# Patient Record
Sex: MALE | Race: WHITE | NOT HISPANIC OR LATINO | Employment: OTHER | URBAN - METROPOLITAN AREA
[De-identification: names, ages, dates, MRNs, and addresses within clinical notes are randomized per-mention and may not be internally consistent; named-entity substitution may affect disease eponyms.]

---

## 2017-01-03 ENCOUNTER — TELEPHONE (OUTPATIENT)
Dept: PHARMACY | Facility: CLINIC | Age: 62
End: 2017-01-03

## 2017-01-06 ENCOUNTER — TELEPHONE (OUTPATIENT)
Dept: PHARMACY | Facility: CLINIC | Age: 62
End: 2017-01-06

## 2017-01-11 ENCOUNTER — TELEPHONE (OUTPATIENT)
Dept: GASTROENTEROLOGY | Facility: CLINIC | Age: 62
End: 2017-01-11

## 2017-01-13 DIAGNOSIS — Z01.818 PRE-TRANSPLANT EVALUATION FOR CHRONIC LIVER DISEASE: Primary | ICD-10-CM

## 2017-01-24 ENCOUNTER — TELEPHONE (OUTPATIENT)
Dept: TRANSPLANT | Facility: CLINIC | Age: 62
End: 2017-01-24

## 2017-01-24 NOTE — TELEPHONE ENCOUNTER
----- Message from Ekaterina Terrell sent at 1/24/2017  9:38 AM CST -----  Contact: Grindl/spouse  Called to see why the PET scan is needed, please call back at [phone]

## 2017-01-24 NOTE — TELEPHONE ENCOUNTER
Phone call returned to patient/ patient's wife.  Questions regarding PET scan answered and addressed.  Informed them that dobutamine stress test showed a severe level of ischemia.Discussed how the PET stress will be more effective at assessing the level of ischemia.  Understanding verbalized.  All instructions regarding the test provided.  Denies any further questions.

## 2017-01-30 ENCOUNTER — TELEPHONE (OUTPATIENT)
Dept: PHARMACY | Facility: CLINIC | Age: 62
End: 2017-01-30

## 2017-02-01 ENCOUNTER — CLINICAL SUPPORT (OUTPATIENT)
Dept: CARDIOLOGY | Facility: CLINIC | Age: 62
End: 2017-02-01
Payer: COMMERCIAL

## 2017-02-01 DIAGNOSIS — Z01.818 PRE-TRANSPLANT EVALUATION FOR CHRONIC LIVER DISEASE: ICD-10-CM

## 2017-02-01 LAB — DIASTOLIC DYSFUNCTION: NO

## 2017-02-01 PROCEDURE — A9555 RB82 RUBIDIUM: HCPCS | Mod: S$GLB,,, | Performed by: INTERNAL MEDICINE

## 2017-02-01 PROCEDURE — 78492 MYOCRD IMG PET MLT RST&STRS: CPT | Mod: S$GLB,,, | Performed by: INTERNAL MEDICINE

## 2017-02-01 PROCEDURE — 96372 THER/PROPH/DIAG INJ SC/IM: CPT | Mod: 59,S$GLB,, | Performed by: INTERNAL MEDICINE

## 2017-02-01 PROCEDURE — 93015 CV STRESS TEST SUPVJ I&R: CPT | Mod: S$GLB,,, | Performed by: INTERNAL MEDICINE

## 2017-02-02 ENCOUNTER — TELEPHONE (OUTPATIENT)
Dept: CARDIOLOGY | Facility: CLINIC | Age: 62
End: 2017-02-02

## 2017-02-02 NOTE — TELEPHONE ENCOUNTER
----- Message from Elan Marin MD sent at 2/1/2017  5:51 PM CST -----  I have reviewed these results which are abnormal. Please open a telephone encounter, call patient to inform them and close the encounter. Not necessary to route back to me.

## 2017-02-02 NOTE — TELEPHONE ENCOUNTER
Patient notified of results of pet stress test. Appointment made in clinic for February 7. Patient and wife agreed. Reminder slip sent in mail. All questions answered.

## 2017-02-03 ENCOUNTER — OFFICE VISIT (OUTPATIENT)
Dept: GASTROENTEROLOGY | Facility: CLINIC | Age: 62
End: 2017-02-03
Payer: COMMERCIAL

## 2017-02-03 VITALS
SYSTOLIC BLOOD PRESSURE: 126 MMHG | DIASTOLIC BLOOD PRESSURE: 70 MMHG | WEIGHT: 297.63 LBS | HEIGHT: 74 IN | BODY MASS INDEX: 38.2 KG/M2 | HEART RATE: 80 BPM

## 2017-02-03 DIAGNOSIS — K74.60 CIRRHOSIS OF LIVER WITHOUT ASCITES, UNSPECIFIED HEPATIC CIRRHOSIS TYPE: Primary | ICD-10-CM

## 2017-02-03 DIAGNOSIS — R94.39 ABNORMAL CARDIOVASCULAR STRESS TEST: ICD-10-CM

## 2017-02-03 DIAGNOSIS — I25.10 CORONARY ARTERY DISEASE INVOLVING NATIVE CORONARY ARTERY OF NATIVE HEART WITHOUT ANGINA PECTORIS: ICD-10-CM

## 2017-02-03 DIAGNOSIS — K76.82 HEPATIC ENCEPHALOPATHY: ICD-10-CM

## 2017-02-03 PROCEDURE — 99214 OFFICE O/P EST MOD 30 MIN: CPT | Mod: S$GLB,,, | Performed by: INTERNAL MEDICINE

## 2017-02-03 PROCEDURE — 99999 PR PBB SHADOW E&M-EST. PATIENT-LVL III: CPT | Mod: PBBFAC,,, | Performed by: INTERNAL MEDICINE

## 2017-02-03 RX ORDER — ASPIRIN 81 MG/1
81 TABLET ORAL DAILY
Status: ON HOLD | COMMUNITY
End: 2018-01-27 | Stop reason: HOSPADM

## 2017-02-03 NOTE — TELEPHONE ENCOUNTER
Patient present at [facility] to  his Xifaxan on 02/03/2017 @10:04am. Prescription was ready at [facility] (profiled prescription for patient to fill at [facility] location).

## 2017-02-03 NOTE — PROGRESS NOTES
Subjective:     Alon Adamson is here for follow up of Cirrhosis      HPI  Since Alon Adamson's last visit he denies any acute issues.  Overall he has been feeling well.  He reports significant stool output to the lactulose that is keeping him thinking clearly.  No issues with overt encephalopathy or seizures.  Denies any gastrointestinal bleeding.  No history of GI bleeding.    Since last visit he was presented to the transplant team and was deferred for transplantation because of abnormal cardiac evaluation.  I have discussed with Dr. Marin seeing if there is any way every week and intervene on his cardiovascular disease and then at some point repeat a dobutamine stress test to see if he has a more favorable response.  Otherwise the transplant team will have to decline the patient given high risk secondary to abnormal cardiac stress testing along with other co morbidities (obesity and diabetes)    Review of Systems   Constitutional: Negative.    HENT: Negative.    Eyes: Negative.    Respiratory: Negative.    Cardiovascular: Negative.    Gastrointestinal: Negative.    Genitourinary: Negative.    Musculoskeletal: Negative.    Skin: Negative.    Neurological: Negative.    Psychiatric/Behavioral: Negative.        Objective:     Physical Exam   Constitutional: He is oriented to person, place, and time. He appears well-developed and well-nourished. No distress.   HENT:   Head: Normocephalic and atraumatic.   Mouth/Throat: No oropharyngeal exudate.   Poor dentition   Eyes: Conjunctivae are normal. Pupils are equal, round, and reactive to light. Right eye exhibits no discharge. Left eye exhibits no discharge. No scleral icterus.   Pulmonary/Chest: Effort normal and breath sounds normal. No respiratory distress. He has no wheezes.   Abdominal: Soft. He exhibits no distension. There is no tenderness.   Musculoskeletal: He exhibits edema (minimal BLE).   Neurological: He is alert and oriented to person,  place, and time.   Psychiatric: He has a normal mood and affect. His behavior is normal.   Vitals reviewed.      MELD-Na score: 16 at 11/2/2016  1:05 PM  MELD score: 14 at 11/2/2016  1:05 PM  Calculated from:  Serum Creatinine: 1.2 mg/dL at 11/2/2016  1:05 PM  Serum Sodium: 135 mmol/L at 11/2/2016  1:05 PM  Total Bilirubin: 1.9 mg/dL at 11/2/2016  1:05 PM  INR(ratio): 1.3 at 11/2/2016  1:05 PM  Age: 61 years    WBC   Date Value Ref Range Status   12/07/2016 4.38 3.90 - 12.70 K/uL Final     Hemoglobin   Date Value Ref Range Status   12/07/2016 11.8 (L) 14.0 - 18.0 g/dL Final     Hematocrit   Date Value Ref Range Status   12/07/2016 33.5 (L) 40.0 - 54.0 % Final     Platelets   Date Value Ref Range Status   12/07/2016 58 (L) 150 - 350 K/uL Final     BUN, Bld   Date Value Ref Range Status   12/07/2016 13 6 - 20 mg/dL Final     Creatinine   Date Value Ref Range Status   12/07/2016 1.3 0.5 - 1.4 mg/dL Final     Glucose   Date Value Ref Range Status   12/07/2016 132 (H) 70 - 110 mg/dL Final     Calcium   Date Value Ref Range Status   12/07/2016 8.8 8.7 - 10.5 mg/dL Final     Sodium   Date Value Ref Range Status   12/07/2016 132 (L) 136 - 145 mmol/L Final     Potassium   Date Value Ref Range Status   12/07/2016 5.1 3.5 - 5.1 mmol/L Final     Comment:     *Moderately Hemolyzed     Chloride   Date Value Ref Range Status   12/07/2016 108 95 - 110 mmol/L Final     Magnesium   Date Value Ref Range Status   09/14/2016 1.5 (L) 1.6 - 2.6 mg/dL Final     AST   Date Value Ref Range Status   11/02/2016 50 (H) 10 - 40 U/L Final     ALT   Date Value Ref Range Status   11/02/2016 30 10 - 44 U/L Final     Alkaline Phosphatase   Date Value Ref Range Status   11/02/2016 84 55 - 135 U/L Final     Total Bilirubin   Date Value Ref Range Status   11/02/2016 1.9 (H) 0.1 - 1.0 mg/dL Final     Comment:     For infants and newborns, interpretation of results should be based  on gestational age, weight and in agreement with  clinical  observations.  Premature Infant recommended reference ranges:  Up to 24 hours.............<8.0 mg/dL  Up to 48 hours............<12.0 mg/dL  3-5 days..................<15.0 mg/dL  6-29 days.................<15.0 mg/dL       Albumin   Date Value Ref Range Status   11/02/2016 3.2 (L) 3.5 - 5.2 g/dL Final     INR   Date Value Ref Range Status   12/07/2016 1.2 0.8 - 1.2 Final     Comment:     Coumadin Therapy:  2.0 - 3.0 for INR for all indicators except mechanical heart valves  and antiphospholipid syndromes which should use 2.5 - 3.5.           Assessment/Plan:     1. Cirrhosis of liver without ascites, unspecified hepatic cirrhosis type    2. Hepatic encephalopathy    3. Abnormal cardiovascular stress test    4. Coronary artery disease involving native coronary artery of native heart without angina pectoris      Alon Adamson is a 61 y.o. male withCirrhosis    Cirrhosis of liver without ascites, unspecified hepatic cirrhosis type-decompensated with elevated meld score warrants transplant the transplant team is highly concerned about cardiac risk as well as his comorbidities.  He is currently deferred for listing until his cardiac issues can be further evaluated and managed.  -Meld labs and HCC surveillance due in about 8 weeks  -     US Abdomen Limited; Future; Expected date: 2/3/17  -     Comprehensive metabolic panel; Future; Expected date: 2/3/17  -     CBC auto differential; Future; Expected date: 2/3/17  -     AFP tumor marker; Future; Expected date: 2/3/17  -     Protime-INR; Future; Expected date: 2/3/17    Hepatic encephalopathy-controlled  -Continue lactulose and rifaximin    Abnormal cardiovascular stress test/Coronary artery disease involving native coronary artery of native heart without angina pectoris-patient does have coronary artery disease.  He is following up with cardiology.  -Extensive discussion with patient and family about the fact that if there is intervenable disease we should  try to intervene and then repeat dobutamine stress test to see if he has a more favorable outcome.  If we can show some improvement in his cardiac stress test then he can be represented to the transplant team.  If there is no disease that can be intervened upon then he may not be a transplant candidate. Also explained that we could do nothing and monitor for now given his MELD is relatively low but it is unlikely he would be listed for transplant at this time. They expressed understanding about the complexities of this case, risks and benefits and would like to more forward with trying to intervene if possible on his CAD.  -Advised that he can start a statin and any other medication cardiology feels is necessary to manage his coronary artery disease    RTC in 8 weeks with preclinic labs and imaging    Patient was seen in the liver transplant department at [facility].    Miriam García MD

## 2017-02-03 NOTE — MR AVS SNAPSHOT
[facility]  [address] [place] [address]  Phone: [phone]  Fax: [phone]                  Alon Adamson   2/3/2017 10:30 AM   Office Visit    Description:  Male : 1955   Provider:  Miriam García MD   Department:  [facility]           Reason for Visit     Cirrhosis           Diagnoses this Visit        Comments    Cirrhosis of liver without ascites, unspecified hepatic cirrhosis type    -  Primary     Hepatic encephalopathy         Abnormal cardiovascular stress test         Coronary artery disease involving native coronary artery of native heart without angina pectoris                To Do List           Future Appointments        Provider Department Dept Phone    2017 12:40 PM Elan Marin MD [facility]-Interventional Card [phone]    2017 9:05 AM [facility]AT[facility], [facility] [phone]    2017 9:15 AM [facility] [phone]    2017 10:30 AM Miriam García MD [facility] [phone]      Goals (5 Years of Data)     None      Follow-Up and Disposition     Return in about 8 weeks (around 3/31/2017).      [facility]     [facility] -  Assistance  Registered nurses in the [facility] provide clinical advisement, health education, appointment booking, and other advisory services.  Call for this free service at [phone].             Medications           Message regarding Medications     Verify the changes and/or additions to your medication regime listed below are the same as discussed with your clinician today.  If any of these changes or additions are incorrect, please notify your healthcare provider.             Verify that the below list of medications is an accurate representation of the medications you are currently taking.  If none reported, the list may be blank. If incorrect, please contact your healthcare provider.  "Carry this list with you in case of emergency.           Current Medications     ascorbic acid (VITAMIN C) 1000 MG tablet Take 1,000 mg by mouth once daily.    aspirin (ECOTRIN) 81 MG EC tablet Take 81 mg by mouth once daily.    docusate sodium (COLACE) 100 MG capsule Take 240 mg by mouth once daily.    ferrous sulfate 325 mg (65 mg iron) Tab tablet Take 325 mg by mouth daily with breakfast.    fish oil-omega-3 fatty acids 300-1,000 mg capsule Take 2 capsules (2 g total) by mouth once daily.    furosemide (LASIX) 40 MG tablet Take 1 tablet (40 mg total) by mouth 2 (two) times daily.    GENERLAC 10 gram/15 mL solution Take 60 mLs by mouth. 60mg three times per day    glyBURIDE-metformin 2.5-500 mg (GLUCOVANCE) 2.5-500 mg per tablet Take 2 tablets by mouth once daily. & 1 tab po PM    levetiracetam (KEPPRA) 500 MG Tab Take 500 mg by mouth 2 (two) times daily.    magnesium oxide (MAG-OX) 400 mg tablet Take 1 tablet (400 mg total) by mouth once daily.    melatonin 3 mg Tab Take 3 mg by mouth 2 (two) times daily as needed.    ondansetron (ZOFRAN) 4 MG tablet Take 4 mg by mouth every 8 (eight) hours as needed.    spironolactone (ALDACTONE) 100 MG tablet Take 1.5 tablets (150 mg total) by mouth 2 (two) times daily.    tramadol (ULTRAM) 50 mg tablet Take 50 mg by mouth every 6 (six) hours as needed for Pain.    XIFAXAN 550 mg Tab TAKE 1 TABLET BY MOUTH TWO TIMES A DAY           Clinical Reference Information           Your Vitals Were     BP Pulse Height Weight BMI    126/70 80 6' 2" (1.88 m) 135 kg (297 lb 9.9 oz) 38.21 kg/m2      Blood Pressure          Most Recent Value    BP  126/70      Allergies as of 2/3/2017     Nsaids (Non-steroidal Anti-inflammatory Drug)    Tylenol [Acetaminophen]    Penicillins      Immunizations Administered on Date of Encounter - 2/3/2017     None      Orders Placed During Today's Visit     Future Labs/Procedures Expected by Expires    AFP tumor marker  2/3/2017 4/4/2018    CBC auto " differential  2/3/2017 4/4/2018    Comprehensive metabolic panel  2/3/2017 4/4/2018    Protime-INR  2/3/2017 4/4/2018    US Abdomen Limited  2/3/2017 2/3/2018      Maintenance Dialysis History     Patient has no recorded history of maintenance dialysis.      Transplant Information        Txp Date Organ Coordinator Care Team     Liver Cecilia Beard RN Referring Physician:  BARBARA Rivera   Corresponding Physician:  BARBARA Rivera   Current Hepatologist:  Miriam García MD   Current PCP:  Mckinley Vides MD   Corresponding Physician:  Mckinley Vides MD         Smoking Cessation     If you would like to quit smoking:   You may be eligible for free services if you are a [place] resident and started smoking cigarettes before September 1, 1988.  Call the Smoking Cessation Trust ([facility]) toll free at [phone] or [phone].   Call [phone]-QUIT-NOW if you do not meet the above criteria.            Language Assistance Services     ATTENTION: Language assistance services are available, free of charge. Please call [phone].      ATENCIÓN: Si habla español, tiene a reed disposición servicios gratuitos de asistencia lingüística. Llame al [phone].     CHÚ Ý: N?u b?n nói Ti?ng Vi?t, có các d?ch v? h? tr? ngôn ng? mi?n phí dành cho b?n. G?i s? [phone].         [facility] complies with applicable Federal civil rights laws and does not discriminate on the basis of race, color, national origin, age, disability, or sex.

## 2017-02-13 DIAGNOSIS — K70.30 ALCOHOLIC CIRRHOSIS OF LIVER WITHOUT ASCITES: Primary | ICD-10-CM

## 2017-02-14 ENCOUNTER — OFFICE VISIT (OUTPATIENT)
Dept: CARDIOLOGY | Facility: CLINIC | Age: 62
End: 2017-02-14
Payer: COMMERCIAL

## 2017-02-14 ENCOUNTER — DOCUMENTATION ONLY (OUTPATIENT)
Dept: CARDIOLOGY | Facility: CLINIC | Age: 62
End: 2017-02-14

## 2017-02-14 VITALS
BODY MASS INDEX: 38.68 KG/M2 | DIASTOLIC BLOOD PRESSURE: 65 MMHG | SYSTOLIC BLOOD PRESSURE: 130 MMHG | HEIGHT: 74 IN | HEART RATE: 89 BPM | WEIGHT: 301.38 LBS

## 2017-02-14 DIAGNOSIS — K76.82 HEPATIC ENCEPHALOPATHY: ICD-10-CM

## 2017-02-14 DIAGNOSIS — R94.39 ABNORMAL CARDIOVASCULAR STRESS TEST: Primary | ICD-10-CM

## 2017-02-14 DIAGNOSIS — R60.0 BILATERAL EDEMA OF LOWER EXTREMITY: ICD-10-CM

## 2017-02-14 DIAGNOSIS — R94.39 ABNORMAL DOBUTAMINE STRESS ECHOCARDIOGRAM: ICD-10-CM

## 2017-02-14 PROCEDURE — 99214 OFFICE O/P EST MOD 30 MIN: CPT | Mod: S$GLB,,, | Performed by: INTERNAL MEDICINE

## 2017-02-14 PROCEDURE — 99999 PR PBB SHADOW E&M-EST. PATIENT-LVL IV: CPT | Mod: PBBFAC,,, | Performed by: INTERNAL MEDICINE

## 2017-02-14 RX ORDER — METOPROLOL TARTRATE 25 MG/1
12.5 TABLET, FILM COATED ORAL 2 TIMES DAILY
Qty: 30 TABLET | Refills: 11 | Status: ON HOLD | OUTPATIENT
Start: 2017-02-14 | End: 2018-01-23

## 2017-02-14 RX ORDER — CLOPIDOGREL BISULFATE 75 MG/1
75 TABLET ORAL DAILY
Qty: 30 TABLET | Refills: 11 | Status: SHIPPED | OUTPATIENT
Start: 2017-02-14 | End: 2017-04-21 | Stop reason: ALTCHOICE

## 2017-02-14 RX ORDER — DIPHENHYDRAMINE HCL 25 MG
50 CAPSULE ORAL ONCE
Status: CANCELLED | OUTPATIENT
Start: 2017-02-14 | End: 2017-02-14

## 2017-02-14 RX ORDER — SODIUM CHLORIDE 9 MG/ML
3 INJECTION, SOLUTION INTRAVENOUS CONTINUOUS
Status: CANCELLED | OUTPATIENT
Start: 2017-02-14 | End: 2017-02-14

## 2017-02-14 NOTE — PROGRESS NOTES
Interventional Cardiology Consult Note      Referring MD: Dr Miriam García  Reason for referral:  LHC/PCI prior to liver transplant.    HPI:     Patient is a 61 yr old pleasant gentleman with PMH of DM2, Liver cirrhosis who is being evaluated for liver transplant and is referred by Dr Miriam García for PCI of 70% LAD lesion, and 70% PDA lesion.  He denies chest pain, sob.  He is a non smoker, but does dip tobacco.  As part of his pre-transplant eval primary team wants LAD lesion and PDA lesion stented.    PET STRESS TEST  1. There is a very small sized mild ischemia in the inferoapical wall in the usual distribution of the Posterior Descending Artery. This defect comprises < 5 % of the left ventricular myocardium.   2. Resting wall motion is physiologic. Stress wall motion is physiologic.   3. LV function is normal at rest and stress.  (normal is >= 51%)  4. The ventricular volumes are normal at rest and stress.   5. The extracardiac distribution of radioactivity is normal.   6. There was no previous study available to compare.    ROS:    Constitution: Negative for fever or chills. Negative for weight loss or gain.   HENT: Negative for sore throat or headaches. Negative for rhinorrhea.  Eyes: Negative for blurred or double vision.   Cardiovascular: See above  Pulmonary: Negative for SOB. Negative for cough.   Gastrointestinal: Negative for abdominal pain. Negative for nausea/ vomiting. Negative for diarrhea.   : Negative for dysuria.   Neurological: Negative for focal weakness or sensory changes.  PMH:     Past Medical History   Diagnosis Date    Back pain     Cellulitis     Cirrhosis     DM (diabetes mellitus)     Hearing loss      right ear    Hypertension      diagnosed today (11/25/2015) and prescribed toprol    Leg edema     Nephrolithiasis     JACK (obstructive sleep apnea)     Seizures     Splenomegaly      Past Surgical History   Procedure Laterality Date    Cholecystectomy       laprascopic     Appendectomy       Open    Tonsillectomy      Lumbar discectomy      Splenic artery embolization  04/08/2016     Dr Taveras     Allergies:     Review of patient's allergies indicates:   Allergen Reactions    Nsaids (non-steroidal anti-inflammatory drug)      D/t to liver disease.    Tylenol [acetaminophen]      D/t liver disease.    Penicillins Other (See Comments)     Since childhood was told not to take it.     Medications:     Current Outpatient Prescriptions on File Prior to Visit   Medication Sig Dispense Refill    ascorbic acid (VITAMIN C) 1000 MG tablet Take 1,000 mg by mouth once daily.      aspirin (ECOTRIN) 81 MG EC tablet Take 81 mg by mouth once daily.      docusate sodium (COLACE) 100 MG capsule Take 240 mg by mouth once daily.      ferrous sulfate 325 mg (65 mg iron) Tab tablet Take 325 mg by mouth daily with breakfast.      fish oil-omega-3 fatty acids 300-1,000 mg capsule Take 2 capsules (2 g total) by mouth once daily.      furosemide (LASIX) 40 MG tablet Take 1 tablet (40 mg total) by mouth 2 (two) times daily. 90 tablet 5    GENERLAC 10 gram/15 mL solution Take 60 mLs by mouth. 60mg three times per day  6    glyBURIDE-metformin 2.5-500 mg (GLUCOVANCE) 2.5-500 mg per tablet Take 2 tablets by mouth once daily. & 1 tab po PM  10    levetiracetam (KEPPRA) 500 MG Tab Take 500 mg by mouth 2 (two) times daily.  11    magnesium oxide (MAG-OX) 400 mg tablet Take 1 tablet (400 mg total) by mouth once daily.  0    melatonin 3 mg Tab Take 3 mg by mouth 2 (two) times daily as needed.      ondansetron (ZOFRAN) 4 MG tablet Take 4 mg by mouth every 8 (eight) hours as needed.  3    spironolactone (ALDACTONE) 100 MG tablet Take 1.5 tablets (150 mg total) by mouth 2 (two) times daily. 90 tablet 5    tramadol (ULTRAM) 50 mg tablet Take 50 mg by mouth every 6 (six) hours as needed for Pain.      XIFAXAN 550 mg Tab TAKE 1 TABLET BY MOUTH TWO TIMES A DAY 60 tablet 5     No current  "facility-administered medications on file prior to visit.      Social History:     Social History   Substance Use Topics    Smoking status: Never Smoker    Smokeless tobacco: Current User     Types: Chew    Alcohol use No     Family History:     Family History   Problem Relation Age of Onset    Heart attack Neg Hx     Heart disease Neg Hx     Hypertension Neg Hx      Physical Exam:     Visit Vitals    /65  Comment: LT    Pulse 89    Ht 6' 2" (1.88 m)    Wt (!) 136.7 kg (301 lb 5.9 oz)    BMI 38.69 kg/m2        Constitutional: No acute distress, conversant  HEENT: Sclera icteric, Pupils equal, round and reactive to light, extraocular motions intact, Oropharynx clear  Neck: No JVD, no carotid bruits  Cardiovascular: regular rate and rhythm, no murmur, rubs or gallops, normal S1/S2  Pulmonary: Clear to auscultation bilaterally  Abdominal: Abdomen soft, nontender, nondistended, positive bowel sounds  Extremities: 1+ lower extremity edema,   Pulses: 2+ BL Radial, 2+ BL carotid, 2+ BL DP, 2+ BL PT, normal Allens Test BL  Skin: No ecchymosis, erythema, or ulcers  Psych: Alert and oriented x 3, appropriate affect  Neuro: CNII-XII intact, no focal deficits    Labs:     Lab Results   Component Value Date     (L) 12/07/2016    K 5.1 12/07/2016     12/07/2016    CO2 20 (L) 12/07/2016    BUN 13 12/07/2016    CREATININE 1.3 12/07/2016    ANIONGAP 4 (L) 12/07/2016     Lab Results   Component Value Date    AST 50 (H) 11/02/2016    ALT 30 11/02/2016    ALKPHOS 84 11/02/2016    BILITOT 1.9 (H) 11/02/2016    BILIDIR 1.3 (H) 08/27/2016    ALBUMIN 3.2 (L) 11/02/2016     Lab Results   Component Value Date    CALCIUM 8.8 12/07/2016    MG 1.5 (L) 09/14/2016    PHOS 2.9 08/31/2016     No results found for: BNP, BNPTRIAGEBLO Lab Results   Component Value Date    WBC 4.04 02/14/2017    HGB 11.3 (L) 02/14/2017    HCT 31.5 (L) 02/14/2017    PLT 58 (L) 12/07/2016    GRAN 2.8 12/07/2016    GRAN 64.3 12/07/2016     Lab " Results   Component Value Date    INR 1.3 (H) 02/14/2017     No results found for: CHOL, HDL, LDLCALC, TRIG  Lab Results   Component Value Date    HGBA1C 5.5 08/26/2016     Lab Results   Component Value Date    TSH 1.592 08/27/2016          Imaging:        EF   Date Value Ref Range Status   11/02/2016 50 55 - 65    10/30/2015 50 55 - 65          Assessment:     Patient Active Problem List   Diagnosis    Pre transplant eval -  as per primary team wants him revascularized prior to listing for liver transplant.  Will asp/plavix load. Right radial access.  EBU 3.5 guide, JR4 guide.  BMS candidate for LAD and PDA.    Pulmonary hypertension, mild    Hepatic encephalopathy    Bilateral edema of lower extremity    Seizures    Cirrhosis of liver without ascites    Hypoalbuminemia    Thrombocytopenia    Anemia of chronic disease         Plan:   Will proceed with PCI of LAD and PDA as the transplant service will not offer to list him unless he is revascularized. He will have to wait a minimum of one month post PCI prior to listing as there is a clear increased in perioperative risk for patients who undergo surgery within one month of PCI.    Right radial access - last angiogram done via radial access successfully, RHC however was done through vein.  Patient understands risks and benefits and gives consent.  6 Fr sheath  EBU 3.5  JR 4 guide      I have personally seen, taken the history and examined the patient.  I agree with the housestaff whose note reflects my findings and plan as above.

## 2017-02-14 NOTE — PROGRESS NOTES
OUTPATIENT CATHETERIZATION INSTRUCTIONS    You have been scheduled for a procedure in the catheterization lab on Thursday, March 16, 2017.     Please report to the Cardiology Waiting Area on the Third floor of the hospital and check in at 7:30 AM.   You will then be taken to the SSCU (Short Stay Cardiac Unit) and prepared for your procedure. Please be aware that this is not the time of your procedure but the time you are to arrive. The procedures are scheduled on an hourly basis; however, emergency cases take precedence over all other cases.       You may not have anything to eat or drink after midnight the night before your test. You may take your regular morning medications with water. If there are any medications that you should not take you will be instructed to hold them that morning. If you are diabetic and on Metformin (Glucophage) do not take it the day before, the day of, and for 2 days after your procedure.      The procedure will take 1-2 hours to perform. After the procedure, you will return to SSCU on the third floor of the hospital. You will need to lie still (or keep your arm still) for the next 4 to 6 hours to minimize bleeding from the puncture site. Your family may remain in the room with you during this time.       You may be able to be discharged home that same afternoon if there is someone to drive you home and there were no complications. If you have one of the balloon, stent, or device procedures you may spend the night in the hospital. Your doctor will determine, based on your progress, the date and time of your discharge. The results of your procedure will be discussed with you before you are discharged. Any further testing or procedures will be scheduled for you either before you leave or you will be called with these appointments.       If you should have any questions, concerns, or need to change the date of your procedure, please call  GUTIERREZ Macias @ [phone]    Special  Instructions:  Stop taking your Metformin (Glucophage) on Wednesday, March 15 2017. Hold metformin day before, day of, and 2 days after procedure  Drink plenty of water the day before procedure          THE ABOVE INSTRUCTIONS WERE GIVEN TO THE PATIENT VERBALLY AND THEY VERBALIZED UNDERSTANDING.  THEY DO NOT REQUIRE ANY SPECIAL NEEDS AND DO NOT HAVE ANY LEARNING BARRIERS.          Directions for Reporting to Cardiology Waiting Area in the Hospital  If you park in the Parking Garage:  Take elevators to the1st floor of the parking garage.  Continue past the gift shop, coffee shop, and piano.  Take a right and go to the gold elevators. (Elevator B)  Take the elevator to the 3rd floor.  Follow the arrow on the sign on the wall that says Cath Lab Registration/EP Lab Registration.  Follow the long hallway all the way around until you come to a big open area.  This is the registration area.  Check in at Reception Desk.    OR    If family is dropping you off:  Have them drop you off at the front of the Hospital under the green overhang.  Enter through the doors and take a right.  Take the E elevators to the 3rd floor Cardiology Waiting Area.  Check in at the Reception Desk in the waiting room.

## 2017-02-24 ENCOUNTER — TELEPHONE (OUTPATIENT)
Dept: PHARMACY | Facility: CLINIC | Age: 62
End: 2017-02-24

## 2017-02-24 DIAGNOSIS — K76.82 HEPATIC ENCEPHALOPATHY: ICD-10-CM

## 2017-02-26 RX ORDER — RIFAXIMIN 550 MG/1
TABLET ORAL
Qty: 60 TABLET | Refills: 5 | Status: SHIPPED | OUTPATIENT
Start: 2017-02-26 | End: 2017-02-28 | Stop reason: SDUPTHER

## 2017-02-27 ENCOUNTER — TELEPHONE (OUTPATIENT)
Dept: PHARMACY | Facility: CLINIC | Age: 62
End: 2017-02-27

## 2017-02-28 ENCOUNTER — TELEPHONE (OUTPATIENT)
Dept: GASTROENTEROLOGY | Facility: HOSPITAL | Age: 62
End: 2017-02-28

## 2017-02-28 DIAGNOSIS — K76.82 HEPATIC ENCEPHALOPATHY: ICD-10-CM

## 2017-02-28 NOTE — TELEPHONE ENCOUNTER
----- Message from GREGORY Suarez sent at 2/27/2017 12:54 PM CST -----  Good Afternoon,  This is [facility] requesting a refill authorization for patient Alon Adamson [id number] Xifaxan. Patient is out of refills and will need to be filled soon. Please send authorization to [facility] at [phone].  Thank you    Shirin Avery CPhT.  [facility]

## 2017-03-16 ENCOUNTER — HOSPITAL ENCOUNTER (OUTPATIENT)
Facility: HOSPITAL | Age: 62
Discharge: HOME | End: 2017-03-16
Attending: INTERNAL MEDICINE | Admitting: INTERNAL MEDICINE
Payer: COMMERCIAL

## 2017-03-16 VITALS
BODY MASS INDEX: 38.5 KG/M2 | OXYGEN SATURATION: 98 % | RESPIRATION RATE: 18 BRPM | HEIGHT: 74 IN | DIASTOLIC BLOOD PRESSURE: 53 MMHG | WEIGHT: 300 LBS | TEMPERATURE: 98 F | HEART RATE: 54 BPM | SYSTOLIC BLOOD PRESSURE: 100 MMHG

## 2017-03-16 DIAGNOSIS — I51.9 HEART DISEASE: ICD-10-CM

## 2017-03-16 DIAGNOSIS — K76.82 HEPATIC ENCEPHALOPATHY: ICD-10-CM

## 2017-03-16 DIAGNOSIS — R94.39 ABNORMAL CARDIOVASCULAR STRESS TEST: ICD-10-CM

## 2017-03-16 DIAGNOSIS — R60.0 BILATERAL EDEMA OF LOWER EXTREMITY: ICD-10-CM

## 2017-03-16 DIAGNOSIS — R94.39 ABNORMAL DOBUTAMINE STRESS ECHOCARDIOGRAM: ICD-10-CM

## 2017-03-16 DIAGNOSIS — I25.10 CORONARY ARTERY DISEASE, ANGINA PRESENCE UNSPECIFIED, UNSPECIFIED VESSEL OR LESION TYPE, UNSPECIFIED WHETHER NATIVE OR TRANSPLANTED HEART: Primary | ICD-10-CM

## 2017-03-16 LAB
ABO + RH BLD: NORMAL
ANION GAP SERPL CALC-SCNC: 6 MMOL/L
ANISOCYTOSIS BLD QL SMEAR: SLIGHT
BASOPHILS # BLD AUTO: 0.03 K/UL
BASOPHILS NFR BLD: 0.6 %
BLD GP AB SCN CELLS X3 SERPL QL: NORMAL
BUN SERPL-MCNC: 18 MG/DL
CALCIUM SERPL-MCNC: 10.2 MG/DL
CHLORIDE SERPL-SCNC: 106 MMOL/L
CO2 SERPL-SCNC: 23 MMOL/L
CORONARY STENOSIS: ABNORMAL
CORONARY STENT: YES
CREAT SERPL-MCNC: 1.2 MG/DL
DIFFERENTIAL METHOD: ABNORMAL
EOSINOPHIL # BLD AUTO: 0.2 K/UL
EOSINOPHIL NFR BLD: 4.3 %
ERYTHROCYTE [DISTWIDTH] IN BLOOD BY AUTOMATED COUNT: 13 %
EST. GFR  (AFRICAN AMERICAN): >60 ML/MIN/1.73 M^2
EST. GFR  (NON AFRICAN AMERICAN): >60 ML/MIN/1.73 M^2
GLUCOSE SERPL-MCNC: 130 MG/DL
HCT VFR BLD AUTO: 32.8 %
HGB BLD-MCNC: 11.9 G/DL
HYPOCHROMIA BLD QL SMEAR: ABNORMAL
LYMPHOCYTES # BLD AUTO: 0.9 K/UL
LYMPHOCYTES NFR BLD: 17.3 %
MCH RBC QN AUTO: 36.3 PG
MCHC RBC AUTO-ENTMCNC: 36.3 %
MCV RBC AUTO: 100 FL
MONOCYTES # BLD AUTO: 0.7 K/UL
MONOCYTES NFR BLD: 15.1 %
NEUTROPHILS # BLD AUTO: 3 K/UL
NEUTROPHILS NFR BLD: 62.7 %
OVALOCYTES BLD QL SMEAR: ABNORMAL
PLATELET # BLD AUTO: 44 K/UL
PLATELET BLD QL SMEAR: ABNORMAL
PMV BLD AUTO: 13.6 FL
POCT GLUCOSE: 124 MG/DL (ref 70–110)
POCT GLUCOSE: 162 MG/DL (ref 70–110)
POIKILOCYTOSIS BLD QL SMEAR: SLIGHT
POLYCHROMASIA BLD QL SMEAR: ABNORMAL
POTASSIUM SERPL-SCNC: 4.2 MMOL/L
RBC # BLD AUTO: 3.28 M/UL
SODIUM SERPL-SCNC: 135 MMOL/L
WBC # BLD AUTO: 4.91 K/UL

## 2017-03-16 PROCEDURE — 80048 BASIC METABOLIC PNL TOTAL CA: CPT

## 2017-03-16 PROCEDURE — 63600175 PHARM REV CODE 636 W HCPCS

## 2017-03-16 PROCEDURE — 93010 ELECTROCARDIOGRAM REPORT: CPT | Mod: ,,, | Performed by: INTERNAL MEDICINE

## 2017-03-16 PROCEDURE — C1769 GUIDE WIRE: HCPCS

## 2017-03-16 PROCEDURE — 25000003 PHARM REV CODE 250

## 2017-03-16 PROCEDURE — 25000003 PHARM REV CODE 250: Performed by: INTERNAL MEDICINE

## 2017-03-16 PROCEDURE — 93005 ELECTROCARDIOGRAM TRACING: CPT

## 2017-03-16 PROCEDURE — 86900 BLOOD TYPING SEROLOGIC ABO: CPT

## 2017-03-16 PROCEDURE — 86850 RBC ANTIBODY SCREEN: CPT

## 2017-03-16 PROCEDURE — 85025 COMPLETE CBC W/AUTO DIFF WBC: CPT

## 2017-03-16 PROCEDURE — 99152 MOD SED SAME PHYS/QHP 5/>YRS: CPT | Mod: ,,, | Performed by: INTERNAL MEDICINE

## 2017-03-16 PROCEDURE — 92928 PRQ TCAT PLMT NTRAC ST 1 LES: CPT | Mod: LD,,, | Performed by: INTERNAL MEDICINE

## 2017-03-16 RX ORDER — SODIUM CHLORIDE 9 MG/ML
3 INJECTION, SOLUTION INTRAVENOUS CONTINUOUS
Status: ACTIVE | OUTPATIENT
Start: 2017-03-16 | End: 2017-03-16

## 2017-03-16 RX ORDER — DIPHENHYDRAMINE HCL 50 MG
50 CAPSULE ORAL ONCE
Status: COMPLETED | OUTPATIENT
Start: 2017-03-16 | End: 2017-03-16

## 2017-03-16 RX ORDER — PRAVASTATIN SODIUM 40 MG/1
40 TABLET ORAL ONCE
Status: COMPLETED | OUTPATIENT
Start: 2017-03-16 | End: 2017-03-16

## 2017-03-16 RX ORDER — PRAVASTATIN SODIUM 40 MG/1
40 TABLET ORAL DAILY
Qty: 90 TABLET | Refills: 3 | Status: SHIPPED | OUTPATIENT
Start: 2017-03-16 | End: 2017-03-31

## 2017-03-16 RX ADMIN — DIPHENHYDRAMINE HYDROCHLORIDE 50 MG: 50 CAPSULE ORAL at 08:03

## 2017-03-16 RX ADMIN — SODIUM CHLORIDE 3 ML/KG/HR: 0.9 INJECTION, SOLUTION INTRAVENOUS at 08:03

## 2017-03-16 RX ADMIN — PRAVASTATIN SODIUM 40 MG: 40 TABLET ORAL at 12:03

## 2017-03-16 RX ADMIN — SODIUM CHLORIDE 3 ML/KG/HR: 0.9 INJECTION, SOLUTION INTRAVENOUS at 12:03

## 2017-03-16 NOTE — DISCHARGE SUMMARY
[facility]  Discharge Summary      Admit Date: 3/16/2017    Discharge Date and Time:  03/16/2017 4:00 PM    Attending Physician: Elan Marin MD     Reason for Admission: outpatient procedure    Procedures Performed: Procedure(s) (LRB):  HEART CATH-LEFT (Left)    Hospital Course (synopsis of major diagnoses, care, treatment, and services provided during the course of the hospital stay):  61 M with PMH of cirrhosis undergoing liver transplant evaluation, DM2, and CAD with positive PET stress who presented for [facility] with planned PCI to 70% mid LAD stenosis and 70% ostial R-PDA stenosis. Patient underwent PCI with BMS to the above lesions. Procedure was uncomplicated. He will need to be on DAPT with ASA and Plavix for 1 month, then ASA for life. He was not on a statin, so he was started on Pravastatin (least interaction with immunosuppressants required in solid organ transplant). He will f/u with his cardiologist back home in [place], [place] and with Dr. Miriam García with liver transplant.     Consults: none    Significant Diagnostic Studies:  [facility]:  Angiographic Results  Diagnostic:        Patient has a right dominant coronary artery.      - Left Main Coronary Artery:             The LM is normal. There is KALEB 3 flow.     - Left Anterior Descending Artery:             The distal LAD has a 75% stenosis. There is KALEB 3 flow.                     Lesion Details:   The length is 10mm. The minimum luminal diameter is 0.4 millimeters. The reference vessel diameter is 2.5 millimeters.     - Left Circumflex Artery:             The LCX is normal. There is KALEB 3 flow.     - Right Coronary Artery:             The RCA is normal. There is KALEB 3 flow.     - Posterior Descending Artery:             The ostial PDA has a 75% stenosis. There is KALEB 3 flow.                     Lesion Details:   This is a Type C lesion.  The length is 10mm. Bifurcation is present. The minimum luminal diameter is 0.4 millimeters. The  reference vessel diameter is 2.5 millimeters.    Intervention       Distal LAD:              The lesion was successfully intervened. Post-stenosis of 0%, post-KALEB 3 flow and TMP grade 3. The vessel was accessed natively.  The following items were used: 2.1S58P626 Euphora SC Balloon and Stent Integrity 2.5 X 14.       Ostial PDA:              The lesion was successfully intervened. Post-stenosis of 0%. The vessel was accessed natively.  The following items were used: 2.7Q45A244 Euphora SC Balloon and Stent Integrity 2.5 X 14.    Summary/Post-Operative Diagnosis    Two vessel coronary artery disease.    Successful PCI.    Final Diagnoses:    Principal Problem: Abnormal cardiovascular stress test   Secondary Diagnoses:   Active Hospital Problems    Diagnosis  POA    *Abnormal cardiovascular stress test [R94.39]  Yes    Coronary artery disease [I25.10]  Yes    Abnormal dobutamine stress echocardiogram [R94.39]  Yes    Cirrhosis of liver without ascites [K74.60]  Yes      Resolved Hospital Problems    Diagnosis Date Resolved POA   No resolved problems to display.       Discharged Condition: good    Disposition: Home or Self Care    Follow Up/Patient Instructions:   Cardiac diet  No lifting >5 lbs with right arm for 48 hours.    Medications:  Reconciled Home Medications:   Current Discharge Medication List      START taking these medications    Details   pravastatin (PRAVACHOL) 40 MG tablet Take 1 tablet (40 mg total) by mouth once daily.  Qty: 90 tablet, Refills: 3         CONTINUE these medications which have NOT CHANGED    Details   ascorbic acid (VITAMIN C) 1000 MG tablet Take 1,000 mg by mouth once daily.      aspirin (ECOTRIN) 81 MG EC tablet Take 81 mg by mouth once daily.      clopidogrel (PLAVIX) 75 mg tablet Take 1 tablet (75 mg total) by mouth once daily.  Qty: 30 tablet, Refills: 11      docusate sodium (COLACE) 100 MG capsule Take 240 mg by mouth once daily.      ferrous sulfate 325 mg (65 mg iron) Tab  tablet Take 325 mg by mouth daily with breakfast.      fish oil-omega-3 fatty acids 300-1,000 mg capsule Take 2 capsules (2 g total) by mouth once daily.      furosemide (LASIX) 40 MG tablet Take 1 tablet (40 mg total) by mouth 2 (two) times daily.  Qty: 90 tablet, Refills: 5    Associated Diagnoses: Bilateral edema of lower extremity      GENERLAC 10 gram/15 mL solution Take 60 mLs by mouth. 60mg three times per day  Refills: 6      levetiracetam (KEPPRA) 500 MG Tab Take 500 mg by mouth 2 (two) times daily.  Refills: 11      magnesium oxide (MAG-OX) 400 mg tablet Take 1 tablet (400 mg total) by mouth once daily.  Refills: 0      melatonin 3 mg Tab Take 3 mg by mouth 2 (two) times daily as needed.      metoprolol tartrate (LOPRESSOR) 25 MG tablet Take 0.5 tablets (12.5 mg total) by mouth 2 (two) times daily.  Qty: 30 tablet, Refills: 11      rifAXIMin (XIFAXAN) 550 mg Tab Take 1 tablet (550 mg total) by mouth 2 (two) times daily.  Qty: 60 tablet, Refills: 5    Associated Diagnoses: Hepatic encephalopathy      spironolactone (ALDACTONE) 100 MG tablet Take 1.5 tablets (150 mg total) by mouth 2 (two) times daily.  Qty: 90 tablet, Refills: 5    Associated Diagnoses: Bilateral edema of lower extremity      tramadol (ULTRAM) 50 mg tablet Take 50 mg by mouth every 6 (six) hours as needed for Pain.      glyBURIDE-metformin 2.5-500 mg (GLUCOVANCE) 2.5-500 mg per tablet Take 2 tablets by mouth once daily. & 1 tab po PM  Refills: 10      ondansetron (ZOFRAN) 4 MG tablet Take 4 mg by mouth every 8 (eight) hours as needed.  Refills: 3           No discharge procedures on file.  Follow-up Information     Follow up with Miriam García MD In 1 month.    Specialty:  Gastroenterology    Contact information:    [address] [facility] [id number] [phone]

## 2017-03-16 NOTE — PLAN OF CARE
Problem: Patient Care Overview  Goal: Plan of Care Review  Outcome: Ongoing (interventions implemented as appropriate)  Pt transported via stretcher.  Vss.  Oriented pt to rm and unit.  Post op orders and assessment initiated.  r radial site remains cdi.  vasc band in place.  Family called to bedside. Call bell within reach.  Pt in no acute distress and verbalizes no complaints.  Will continue to monitor.

## 2017-03-16 NOTE — IP AVS SNAPSHOT
[facility]  [address]  [facility] [address]  Phone: [phone]           Patient Discharge Instructions     Our goal is to set you up for success. This packet includes information on your condition, medications, and your home care. It will help you to care for yourself so you don't get sicker and need to go back to the hospital.     Please ask your nurse if you have any questions.        There are many details to remember when preparing to leave the hospital. Here is what you will need to do:    1. Take your medicine. If you are prescribed medications, review your Medication List in the following pages. You may have new medications to  at the pharmacy and others that you'll need to stop taking. Review the instructions for how and when to take your medications. Talk with your doctor or nurses if you are unsure of what to do.     2. Go to your follow-up appointments. Specific follow-up information is listed in the following pages. Your may be contacted by a transition nurse or clinical provider about future appointments. Be sure we have all of the phone numbers to reach you, if needed. Please contact your provider's office if you are unable to make an appointment.     3. Watch for warning signs. Your doctor or nurse will give you detailed warning signs to watch for and when to call for assistance. These instructions may also include educational information about your condition. If you experience any of warning signs to your health, call your doctor.               [facility]  Unless otherwise directed by your provider, please contact [facility], our nurse care line that is available for 24/7 assistance.     [phone] (toll-free)    Registered nurses in the [facility] provide clinical advisement, health education, appointment booking, and other advisory services.                    ** Verify the list of medication(s) below is accurate and up  to date. Carry this with you in case of emergency. If your medications have changed, please notify your healthcare provider.             Medication List      START taking these medications        Additional Info                      pravastatin 40 MG tablet   Commonly known as:  PRAVACHOL   Quantity:  90 tablet   Refills:  3   Dose:  40 mg    Last time this was given:  40 mg on 3/16/2017 12:57 PM   Instructions:  Take 1 tablet (40 mg total) by mouth once daily.     Begin Date    AM    Noon    PM    Bedtime         CONTINUE taking these medications        Additional Info                      ascorbic acid (vitamin C) 1000 MG tablet   Commonly known as:  VITAMIN C   Refills:  0   Dose:  1000 mg    Instructions:  Take 1,000 mg by mouth once daily.     Begin Date    AM    Noon    PM    Bedtime       aspirin 81 MG EC tablet   Commonly known as:  ECOTRIN   Refills:  0   Dose:  81 mg    Instructions:  Take 81 mg by mouth once daily.     Begin Date    AM    Noon    PM    Bedtime       clopidogrel 75 mg tablet   Commonly known as:  PLAVIX   Quantity:  30 tablet   Refills:  11   Dose:  75 mg    Instructions:  Take 1 tablet (75 mg total) by mouth once daily.     Begin Date    AM    Noon    PM    Bedtime       docusate sodium 100 MG capsule   Commonly known as:  COLACE   Refills:  0   Dose:  240 mg    Instructions:  Take 240 mg by mouth once daily.     Begin Date    AM    Noon    PM    Bedtime       ferrous sulfate 325 mg (65 mg iron) Tab tablet   Refills:  0   Dose:  325 mg    Instructions:  Take 325 mg by mouth daily with breakfast.     Begin Date    AM    Noon    PM    Bedtime       fish oil-omega-3 fatty acids 300-1,000 mg capsule   Refills:  0   Dose:  2 g    Instructions:  Take 2 capsules (2 g total) by mouth once daily.     Begin Date    AM    Noon    PM    Bedtime       furosemide 40 MG tablet   Commonly known as:  LASIX   Quantity:  90 tablet   Refills:  5   Dose:  40 mg    Instructions:  Take 1 tablet (40 mg total) by  mouth 2 (two) times daily.     Begin Date    AM    Noon    PM    Bedtime       GENERLAC 10 gram/15 mL solution   Refills:  6   Dose:  60 mL   Generic drug:  lactulose    Instructions:  Take 60 mLs by mouth. 60mg three times per day     Begin Date    AM    Noon    PM    Bedtime       glyBURIDE-metformin 2.5-500 mg 2.5-500 mg per tablet   Commonly known as:  GLUCOVANCE   Refills:  10   Dose:  2 tablet    Instructions:  Take 2 tablets by mouth once daily. & 1 tab po PM     Begin Date    AM    Noon    PM    Bedtime       levetiracetam 500 MG Tab   Commonly known as:  KEPPRA   Refills:  11   Dose:  500 mg    Instructions:  Take 500 mg by mouth 2 (two) times daily.     Begin Date    AM    Noon    PM    Bedtime       magnesium oxide 400 mg tablet   Commonly known as:  MAG-OX   Refills:  0   Dose:  400 mg    Instructions:  Take 1 tablet (400 mg total) by mouth once daily.     Begin Date    AM    Noon    PM    Bedtime       melatonin 3 mg Tab   Refills:  0   Dose:  3 mg    Instructions:  Take 3 mg by mouth 2 (two) times daily as needed.     Begin Date    AM    Noon    PM    Bedtime       metoprolol tartrate 25 MG tablet   Commonly known as:  LOPRESSOR   Quantity:  30 tablet   Refills:  11   Dose:  12.5 mg    Instructions:  Take 0.5 tablets (12.5 mg total) by mouth 2 (two) times daily.     Begin Date    AM    Noon    PM    Bedtime       ondansetron 4 MG tablet   Commonly known as:  ZOFRAN   Refills:  3   Dose:  4 mg    Instructions:  Take 4 mg by mouth every 8 (eight) hours as needed.     Begin Date    AM    Noon    PM    Bedtime       rifAXIMin 550 mg Tab   Commonly known as:  XIFAXAN   Quantity:  60 tablet   Refills:  5   Dose:  550 mg    Instructions:  Take 1 tablet (550 mg total) by mouth 2 (two) times daily.     Begin Date    AM    Noon    PM    Bedtime       spironolactone 100 MG tablet   Commonly known as:  ALDACTONE   Quantity:  90 tablet   Refills:  5   Dose:  150 mg    Instructions:  Take 1.5 tablets (150 mg total)  by mouth 2 (two) times daily.     Begin Date    AM    Noon    PM    Bedtime       tramadol 50 mg tablet   Commonly known as:  ULTRAM   Refills:  0   Dose:  50 mg    Instructions:  Take 50 mg by mouth every 6 (six) hours as needed for Pain.     Begin Date    AM    Noon    PM    Bedtime            Where to Get Your Medications      These medications were sent to [id number]  [place], [place] - [address] AT [facility]  [address] [place] [address]    Hours:  24-hours Phone:  [phone]     pravastatin 40 MG tablet                  Please bring to all follow up appointments:    1. A copy of your discharge instructions.  2. All medicines you are currently taking in their original bottles.  3. Identification and insurance card.    Please arrive 15 minutes ahead of scheduled appointment time.    Please call 24 hours in advance if you must reschedule your appointment and/or time.        Your Scheduled Appointments     Apr 21, 2017  9:05 AM CDT   [facility]    [address] [place] [address]   [phone]            Apr 21, 2017  9:15 AM CDT   [facility]domen with [facility]   [facility]    [address] [place] [address]   [phone]            Apr 21, 2017 10:30 AM CDT   Established Patient with Miriam García MD   [facility])    [address]  [place] [address]   [phone]              Follow-up Information     Follow up with Miriam García MD In 1 month.    Specialty:  Gastroenterology    Contact information:    [address]  [facility] [id number] [phone]          Discharge References/Attachments     CARDIAC CATHETERIZATION, DISCHARGE INSTRUCTIONS FOR (ENGLISH)        Primary Diagnosis     Your primary diagnosis was:  Abnormal Cardiovascular Stress Test      Admission Information     Date & Time Provider Department CSN     "3/16/2017  7:26 AM Elan Marin MD [facility] [id number]      Care Providers     Provider Role Specialty Primary office phone    Elan Marin MD Attending Provider Cardiology [phone]      Your Vitals Were     BP Pulse Temp Resp Height Weight    100/53 54 97.6 °F (36.4 °C) (Oral) 18 6' 2" (1.88 m) 136.1 kg (300 lb)    SpO2 BMI             98% 38.52 kg/m2         Recent Lab Values        10/29/2015 4/8/2016 8/26/2016                     7:45 AM  9:28 PM  3:43 PM         A1C 6.1 5.8 5.5         Comment for A1C at  3:43 PM on 8/26/2016:  According to ADA guidelines, hemoglobin A1C <7.0% represents  optimal control in non-pregnant diabetic patients.  Different  metrics may apply to specific populations.   Standards of Medical Care in Diabetes - 2016.  For the purpose of screening for the presence of diabetes:  <5.7%     Consistent with the absence of diabetes  5.7-6.4%  Consistent with increasing risk for diabetes   (prediabetes)  >or=6.5%  Consistent with diabetes  Currently no consensus exists for use of hemoglobin A1C  for diagnosis of diabetes for children.        Allergies as of 3/16/2017        Reactions    Nsaids (Non-steroidal Anti-inflammatory Drug)     D/t to liver disease.    Tylenol [Acetaminophen]     D/t liver disease.    Penicillins Other (See Comments)    Since childhood was told not to take it.      Advance Directives     An advance directive is a document which, in the event you are no longer able to make decisions for yourself, tells your healthcare team what kind of treatment you do or do not want to receive, or who you would like to make those decisions for you.  If you do not currently have an advance directive, [facility] encourages you to create one.  For more information call:  [phone]), [phone] ([phone]),  or log on to [email or web address].        Language Assistance Services     ATTENTION: Language assistance services are available, " free of charge. Please call [phone].      ATENCIÓN: Si habla español, tiene a reed disposición servicios gratuitos de asistencia lingüística. Llame al [phone].     CHÚ Ý: N?u b?n nói Ti?ng Vi?t, có các d?ch v? h? tr? ngôn ng? mi?n phí dành cho b?n. G?i s? [phone].         [facility] complies with applicable Federal civil rights laws and does not discriminate on the basis of race, color, national origin, age, disability, or sex.

## 2017-03-16 NOTE — PROGRESS NOTES
POST CATH NOTE    Procedure:  PCI of mid LAD and ostial rPDA  Referring MD:Dr Miriam García  Indication:  Abnormal stress testing   Access:  R-Radial    Findings:  Please see formal cath report for full details of the procedure.    Intervention:  Successful PCI of mid LAD with 2.5 x 14 mm BMS stent, lesion reduced  to 0%, KALEB III flow distally.    Successful PCI of ostial rPDA with 2.5 x 14 mm BMS stent, lesion reduced  to 0%, KALEB III flow distally.    Patient tolerated the procedure well, no complications    Access site Closure:  Vasc Band was used to establish hemostasis at the access site.    Post Cath Exam:  Vitals:    03/16/17 0807   BP: 125/60   Pulse:    Resp:    Temp:      No unusual pain, hematoma, or thrill at vascular access site.  Distal pulse present without signs of ischemia.    Recommendation:  -continue dual antiplatelet therapy daily.  -high-potency statin, ACE-I and BB therapy  -vascular risk factor control      Signed:  Aquiles Sheikh MD  Interventional Cardiology Fellow, PGY-9  3/16/2017 11:49 AM

## 2017-03-16 NOTE — NURSING
Pt d/c'd to home per md orders via w/c accompanied by family.  Vss.  r radial site remains cdi.  0 bleed, 0 hematoma.  Instructed pt and family on home medications, post procedure precautions and follow up visits.  Pt and family verbalizes understanding.  Piv removed intact and without difficulty.  Telemetry monitor removed.  Pt in no acute distress and verbalizes no complaints.

## 2017-03-16 NOTE — INTERVAL H&P NOTE
The patient has been examined and the H&P has been reviewed:    I concur with the findings and no changes have occurred since H&P was written.    Anesthesia/Surgery risks, benefits and alternative options discussed and understood by patient/family.          Active Hospital Problems    Diagnosis  POA    Abnormal cardiovascular stress test [R94.39]  Yes      Resolved Hospital Problems    Diagnosis Date Resolved POA   No resolved problems to display.

## 2017-03-17 LAB
POC ACTIVATED CLOTTING TIME K: 245 SEC (ref 74–137)
SAMPLE: ABNORMAL

## 2017-03-18 ENCOUNTER — NURSE TRIAGE (OUTPATIENT)
Dept: ADMINISTRATIVE | Facility: CLINIC | Age: 62
End: 2017-03-18

## 2017-03-18 NOTE — TELEPHONE ENCOUNTER
Reason for Disposition   [1] Caller requesting NON-URGENT health information AND [2] PCP's office is the best resource    Protocols used: ST INFORMATION ONLY CALL-A-AH    Patient called about his stent id cards because he didn't receive any in his discharge packet. Patient informed that a message would be sent to his cardiology team about his inquiry. Patient would like the cards mailed to him if possible.

## 2017-03-22 ENCOUNTER — TELEPHONE (OUTPATIENT)
Dept: PHARMACY | Facility: CLINIC | Age: 62
End: 2017-03-22

## 2017-03-31 ENCOUNTER — PATIENT MESSAGE (OUTPATIENT)
Dept: CARDIOLOGY | Facility: CLINIC | Age: 62
End: 2017-03-31

## 2017-03-31 RX ORDER — ATORVASTATIN CALCIUM 40 MG/1
40 TABLET, FILM COATED ORAL DAILY
Qty: 30 TABLET | Refills: 6 | Status: SHIPPED | OUTPATIENT
Start: 2017-03-31 | End: 2017-11-21 | Stop reason: SDUPTHER

## 2017-03-31 RX ORDER — ATORVASTATIN CALCIUM 40 MG/1
40 TABLET, FILM COATED ORAL DAILY
COMMUNITY
End: 2017-03-31 | Stop reason: SDUPTHER

## 2017-04-03 DIAGNOSIS — K76.82 HEPATIC ENCEPHALOPATHY: Primary | ICD-10-CM

## 2017-04-03 RX ORDER — LACTULOSE 10 G/15ML
60 SOLUTION ORAL; RECTAL 3 TIMES DAILY
Qty: 5400 ML | Refills: 6 | Status: SHIPPED | OUTPATIENT
Start: 2017-04-03 | End: 2017-12-15 | Stop reason: SDUPTHER

## 2017-04-17 ENCOUNTER — TELEPHONE (OUTPATIENT)
Dept: PHARMACY | Facility: CLINIC | Age: 62
End: 2017-04-17

## 2017-04-20 ENCOUNTER — TELEPHONE (OUTPATIENT)
Dept: RADIOLOGY | Facility: HOSPITAL | Age: 62
End: 2017-04-20

## 2017-04-20 ENCOUNTER — TELEPHONE (OUTPATIENT)
Dept: PHARMACY | Facility: CLINIC | Age: 62
End: 2017-04-20

## 2017-04-21 ENCOUNTER — HOSPITAL ENCOUNTER (OUTPATIENT)
Dept: RADIOLOGY | Facility: HOSPITAL | Age: 62
Discharge: HOME | End: 2017-04-21
Attending: INTERNAL MEDICINE
Payer: COMMERCIAL

## 2017-04-21 ENCOUNTER — OFFICE VISIT (OUTPATIENT)
Dept: GASTROENTEROLOGY | Facility: CLINIC | Age: 62
End: 2017-04-21
Payer: COMMERCIAL

## 2017-04-21 VITALS
WEIGHT: 299.81 LBS | BODY MASS INDEX: 38.48 KG/M2 | HEIGHT: 74 IN | SYSTOLIC BLOOD PRESSURE: 134 MMHG | HEART RATE: 60 BPM | DIASTOLIC BLOOD PRESSURE: 68 MMHG

## 2017-04-21 DIAGNOSIS — I25.10 CORONARY ARTERY DISEASE INVOLVING NATIVE CORONARY ARTERY OF NATIVE HEART WITHOUT ANGINA PECTORIS: Primary | ICD-10-CM

## 2017-04-21 DIAGNOSIS — K74.60 CIRRHOSIS OF LIVER WITHOUT ASCITES, UNSPECIFIED HEPATIC CIRRHOSIS TYPE: ICD-10-CM

## 2017-04-21 PROCEDURE — 76705 ECHO EXAM OF ABDOMEN: CPT | Mod: TC,PO,TXP

## 2017-04-21 PROCEDURE — 1160F RVW MEDS BY RX/DR IN RCRD: CPT | Mod: S$GLB,TXP,, | Performed by: INTERNAL MEDICINE

## 2017-04-21 PROCEDURE — 99999 PR PBB SHADOW E&M-EST. PATIENT-LVL III: CPT | Mod: PBBFAC,TXP,, | Performed by: INTERNAL MEDICINE

## 2017-04-21 PROCEDURE — 99213 OFFICE O/P EST LOW 20 MIN: CPT | Mod: S$GLB,TXP,, | Performed by: INTERNAL MEDICINE

## 2017-04-21 PROCEDURE — 76705 ECHO EXAM OF ABDOMEN: CPT | Mod: 26,TXP,, | Performed by: RADIOLOGY

## 2017-04-21 NOTE — PROGRESS NOTES
Subjective:     Alon Adamson is here for follow up of Cirrhosis      HPI  Since Alon Adamson's last visit he had PCI with stent placement and was on Plavix for month.  He is now off the Plavix and only on aspirin.  He denies any GI bleeding.  Overall he has been feeling fairly well.  He did have an issue with pravastatin causing weakness and myopathy but that has resolved.  He is on atorvastatin.  He denies any episodes of hepatic encephalopathy.    Review of Systems    Objective:     Physical Exam   Constitutional: He is oriented to person, place, and time. He appears well-developed and well-nourished. No distress.   HENT:   Head: Normocephalic and atraumatic.   Mouth/Throat: Oropharynx is clear and moist. No oropharyngeal exudate.   Eyes: Conjunctivae are normal. Pupils are equal, round, and reactive to light. Right eye exhibits no discharge. Left eye exhibits no discharge. No scleral icterus.   Pulmonary/Chest: Effort normal and breath sounds normal. No respiratory distress. He has no wheezes.   Abdominal: Soft. He exhibits no distension. There is no tenderness.   Musculoskeletal: He exhibits edema (1+ LE ).   Neurological: He is alert and oriented to person, place, and time.   Psychiatric: He has a normal mood and affect. His behavior is normal.   Vitals reviewed.    US 4/2017          1.  Liver is normal in size with findings consistent with known cirrhosis.  No solid or complex nodule/mass identified.    2.  1.3 cm peripheral cyst LEFT hepatic lobe.    3.  Status post cholecystectomy.    4.  Nonobstructing RIGHT nephrolithiasis.           MELD-Na score: 14 at 4/21/2017  9:00 AM  MELD score: 14 at 4/21/2017  9:00 AM  Calculated from:  Serum Creatinine: 1.3 mg/dL at 4/21/2017  9:00 AM  Serum Sodium: 139 mmol/L (Rounded to 137) at 4/21/2017  9:00 AM  Total Bilirubin: 1.6 mg/dL at 4/21/2017  9:00 AM  INR(ratio): 1.3 at 4/21/2017  9:00 AM  Age: 62 years    WBC   Date Value Ref Range Status    04/21/2017 4.16 3.90 - 12.70 K/uL Final     Hemoglobin   Date Value Ref Range Status   04/21/2017 11.2 (L) 14.0 - 18.0 g/dL Final     Hematocrit   Date Value Ref Range Status   04/21/2017 31.7 (L) 40.0 - 54.0 % Final     Platelets   Date Value Ref Range Status   04/21/2017 45 (L) 150 - 350 K/uL Final     BUN, Bld   Date Value Ref Range Status   04/21/2017 15 8 - 23 mg/dL Final     Creatinine   Date Value Ref Range Status   04/21/2017 1.3 0.5 - 1.4 mg/dL Final     Glucose   Date Value Ref Range Status   04/21/2017 98 70 - 110 mg/dL Final     Calcium   Date Value Ref Range Status   04/21/2017 9.0 8.7 - 10.5 mg/dL Final     Sodium   Date Value Ref Range Status   04/21/2017 139 136 - 145 mmol/L Final     Potassium   Date Value Ref Range Status   04/21/2017 4.4 3.5 - 5.1 mmol/L Final     Chloride   Date Value Ref Range Status   04/21/2017 111 (H) 95 - 110 mmol/L Final     Magnesium   Date Value Ref Range Status   09/14/2016 1.5 (L) 1.6 - 2.6 mg/dL Final     AST   Date Value Ref Range Status   04/21/2017 48 (H) 10 - 40 U/L Final     ALT   Date Value Ref Range Status   04/21/2017 29 10 - 44 U/L Final     Alkaline Phosphatase   Date Value Ref Range Status   04/21/2017 58 55 - 135 U/L Final     Total Bilirubin   Date Value Ref Range Status   04/21/2017 1.6 (H) 0.1 - 1.0 mg/dL Final     Comment:     For infants and newborns, interpretation of results should be based  on gestational age, weight and in agreement with clinical  observations.  Premature Infant recommended reference ranges:  Up to 24 hours.............<8.0 mg/dL  Up to 48 hours............<12.0 mg/dL  3-5 days..................<15.0 mg/dL  6-29 days.................<15.0 mg/dL       Albumin   Date Value Ref Range Status   04/21/2017 3.1 (L) 3.5 - 5.2 g/dL Final     INR   Date Value Ref Range Status   04/21/2017 1.3 (H) 0.8 - 1.2 Final     Comment:     Coumadin Therapy:  2.0 - 3.0 for INR for all indicators except mechanical heart valves  and antiphospholipid  syndromes which should use 2.5 - 3.5.           Assessment/Plan:     1. Coronary artery disease involving native coronary artery of native heart without angina pectoris    2. Cirrhosis of liver without ascites, unspecified hepatic cirrhosis type      Alon Adamson is a 62 y.o. male withCirrhosis    Coronary artery disease involving native coronary artery of native heart without angina pectoris- s/p PCI  -Would like to re-eval with dobutamine stress given concerns about previous dobutamine stress testing  -     Pharmacological stress echo; Future    Cirrhosis of liver without ascites, unspecified hepatic cirrhosis type- he remains stable  -After stress test will re-present to selection committee  -     Comprehensive metabolic panel; Future; Expected date: 4/21/17  -     CBC auto differential; Future; Expected date: 4/21/17  -     Protime-INR; Future; Expected date: 4/21/17    Return to clinic in 3 months or pre-clinic labs    Patient was seen in the liver transplant department at [facility].      Miriam García MD

## 2017-04-21 NOTE — MR AVS SNAPSHOT
[facility]  [address] [place] [address]  Phone: [phone]  Fax: [phone]                  Alon Adamson   2017 10:30 AM   Office Visit    Description:  Male : 1955   Provider:  Miriam García MD   Department:  [facility] - [facility]           Reason for Visit     Cirrhosis           Diagnoses this Visit        Comments    Coronary artery disease involving native coronary artery of native heart without angina pectoris    -  Primary     Cirrhosis of liver without ascites, unspecified hepatic cirrhosis type                To Do List           Future Appointments        Provider Department Dept Phone    2017 9:55 AM LABORATORY, [facility] [phone]    2017 10:30 AM Miriam García MD [facility] [phone]      Goals (5 Years of Data)     None      [facility]     [facility] Nurse Care Line -  Assistance  Unless otherwise directed by your provider, please contact [facility], our nurse care line that is available for  assistance.     Registered nurses in the [facility] provide: appointment scheduling, clinical advisement, health education, and other advisory services.  Call: [phone] (toll free)               Medications           Message regarding Medications     Verify the changes and/or additions to your medication regime listed below are the same as discussed with your clinician today.  If any of these changes or additions are incorrect, please notify your healthcare provider.        STOP taking these medications     clopidogrel (PLAVIX) 75 mg tablet Take 1 tablet (75 mg total) by mouth once daily.           Verify that the below list of medications is an accurate representation of the medications you are currently taking.  If none reported, the list may be blank. If incorrect, please contact your healthcare provider. Carry this list with you in case of emergency.     "       Current Medications     ascorbic acid (VITAMIN C) 1000 MG tablet Take 1,000 mg by mouth once daily.    aspirin (ECOTRIN) 81 MG EC tablet Take 81 mg by mouth once daily.    atorvastatin (LIPITOR) 40 MG tablet Take 1 tablet (40 mg total) by mouth once daily.    docusate sodium (COLACE) 100 MG capsule Take 240 mg by mouth once daily.    ferrous sulfate 325 mg (65 mg iron) Tab tablet Take 325 mg by mouth daily with breakfast.    fish oil-omega-3 fatty acids 300-1,000 mg capsule Take 2 capsules (2 g total) by mouth once daily.    furosemide (LASIX) 40 MG tablet Take 1 tablet (40 mg total) by mouth 2 (two) times daily.    GENERLAC 10 gram/15 mL solution Take 60 mLs (40 g total) by mouth 3 (three) times daily. 60mg three times per day    glyBURIDE-metformin 2.5-500 mg (GLUCOVANCE) 2.5-500 mg per tablet Take 2 tablets by mouth once daily. & 1 tab po PM    levetiracetam (KEPPRA) 500 MG Tab Take 500 mg by mouth 2 (two) times daily.    magnesium oxide (MAG-OX) 400 mg tablet Take 1 tablet (400 mg total) by mouth once daily.    melatonin 3 mg Tab Take 3 mg by mouth 2 (two) times daily as needed.    metoprolol tartrate (LOPRESSOR) 25 MG tablet Take 0.5 tablets (12.5 mg total) by mouth 2 (two) times daily.    ondansetron (ZOFRAN) 4 MG tablet Take 4 mg by mouth every 8 (eight) hours as needed.    rifAXIMin (XIFAXAN) 550 mg Tab Take 1 tablet (550 mg total) by mouth 2 (two) times daily.    spironolactone (ALDACTONE) 100 MG tablet Take 1.5 tablets (150 mg total) by mouth 2 (two) times daily.    tramadol (ULTRAM) 50 mg tablet Take 50 mg by mouth every 6 (six) hours as needed for Pain.           Clinical Reference Information           Your Vitals Were     BP Pulse Height Weight BMI    134/68 60 6' 2" (1.88 m) 136 kg (299 lb 13.2 oz) 38.5 kg/m2      Blood Pressure          Most Recent Value    BP  134/68      Allergies as of 4/21/2017     Nsaids (Non-steroidal Anti-inflammatory Drug)    Tylenol [Acetaminophen]    Penicillins    "   Immunizations Administered on Date of Encounter - 4/21/2017     None      Orders Placed During Today's Visit     Future Labs/Procedures Expected by Expires    CBC auto differential  4/21/2017 6/20/2018    Comprehensive metabolic panel  4/21/2017 6/20/2018    Protime-INR  4/21/2017 6/20/2018    Pharmacological stress test w/ Color Demian  As directed 4/21/2018      Maintenance Dialysis History     Patient has no recorded history of maintenance dialysis.      Transplant Information        Txp Date Organ Coordinator Care Team     Liver Cecilia Beard RN Referring Physician:  BARBARA Rivera   Corresponding Physician:  BARBARA Rivera   Current Hepatologist:  Miriam García MD   Current PCP:  Mckinley Vides MD   Corresponding Physician:  Mckinley Vides MD         Smoking Cessation     If you would like to quit smoking:   You may be eligible for free services if you are a [place] resident and started smoking cigarettes before September 1, 1988.  Call the Smoking Cessation Trust ([facility]) toll free at ([phone] or [phone].   Call 1-800-QUIT-NOW if you do not meet the above criteria.   Contact us via email: [email or web address]   View our website for more information: [email or web address]        Language Assistance Services     ATTENTION: Language assistance services are available, free of charge. Please call [phone].      ATENCIÓN: Si habla español, tiene a reed disposición servicios gratuitos de asistencia lingüística. Llame al [phone].     CHÚ Ý: N?u b?n nói Ti?ng Vi?t, có các d?ch v? h? tr? ngôn ng? mi?n phí dành cho b?n. G?i s? [phone].         [facility] complies with applicable Federal civil rights laws and does not discriminate on the basis of race, color, national origin, age, disability, or sex.

## 2017-05-11 ENCOUNTER — TELEPHONE (OUTPATIENT)
Dept: TRANSPLANT | Facility: CLINIC | Age: 62
End: 2017-05-11

## 2017-05-11 ENCOUNTER — PATIENT MESSAGE (OUTPATIENT)
Dept: ADMINISTRATIVE | Facility: OTHER | Age: 62
End: 2017-05-11

## 2017-05-11 NOTE — TELEPHONE ENCOUNTER
----- Message from Cecilia Beard RN sent at 5/10/2017 12:15 PM CDT -----  Contact: pt      ----- Message -----     From: Ekaterina Terrell     Sent: 5/9/2017   4:49 PM       To: [facility] Pre-Liver Transplant Clinical    Called to have stress test moved to 1st week in June prefer morning(10 am). Please call back at  [phone]

## 2017-05-16 ENCOUNTER — TELEPHONE (OUTPATIENT)
Dept: PHARMACY | Facility: CLINIC | Age: 62
End: 2017-05-16

## 2017-06-12 ENCOUNTER — TELEPHONE (OUTPATIENT)
Dept: PHARMACY | Facility: CLINIC | Age: 62
End: 2017-06-12

## 2017-07-06 ENCOUNTER — TELEPHONE (OUTPATIENT)
Dept: PHARMACY | Facility: CLINIC | Age: 62
End: 2017-07-06

## 2017-07-20 ENCOUNTER — TELEPHONE (OUTPATIENT)
Dept: TRANSPLANT | Facility: CLINIC | Age: 62
End: 2017-07-20

## 2017-08-02 ENCOUNTER — TELEPHONE (OUTPATIENT)
Dept: PHARMACY | Facility: CLINIC | Age: 62
End: 2017-08-02

## 2017-08-30 ENCOUNTER — TELEPHONE (OUTPATIENT)
Dept: PHARMACY | Facility: CLINIC | Age: 62
End: 2017-08-30

## 2017-09-27 ENCOUNTER — TELEPHONE (OUTPATIENT)
Dept: PHARMACY | Facility: CLINIC | Age: 62
End: 2017-09-27

## 2017-09-27 DIAGNOSIS — K76.82 HEPATIC ENCEPHALOPATHY: ICD-10-CM

## 2017-09-27 RX ORDER — RIFAXIMIN 550 MG/1
TABLET ORAL
Qty: 60 TABLET | Refills: 5 | Status: SHIPPED | OUTPATIENT
Start: 2017-09-27 | End: 2017-12-15 | Stop reason: SDUPTHER

## 2017-10-26 ENCOUNTER — TELEPHONE (OUTPATIENT)
Dept: PHARMACY | Facility: CLINIC | Age: 62
End: 2017-10-26

## 2017-11-03 ENCOUNTER — TELEPHONE (OUTPATIENT)
Dept: GASTROENTEROLOGY | Facility: CLINIC | Age: 62
End: 2017-11-03

## 2017-11-03 NOTE — TELEPHONE ENCOUNTER
----- Message from Izzy Hong sent at 11/3/2017 12:13 PM CDT -----      ----- Message -----  From: Ana Scott  Sent: 11/3/2017  12:03 PM  To: [facility] Pre-Liver Transplant Non-Clinical    Patient calling to schedule with Dr. García.      Please call

## 2017-11-22 RX ORDER — ATORVASTATIN CALCIUM 40 MG/1
TABLET, FILM COATED ORAL
Qty: 30 TABLET | Refills: 0 | Status: SHIPPED | OUTPATIENT
Start: 2017-11-22 | End: 2017-12-30 | Stop reason: SDUPTHER

## 2017-11-24 ENCOUNTER — TELEPHONE (OUTPATIENT)
Dept: PHARMACY | Facility: CLINIC | Age: 62
End: 2017-11-24

## 2017-11-28 ENCOUNTER — TELEPHONE (OUTPATIENT)
Dept: TRANSPLANT | Facility: CLINIC | Age: 62
End: 2017-11-28

## 2017-11-28 NOTE — TELEPHONE ENCOUNTER
----- Message from Ana Mcclure sent at 11/28/2017 10:51 AM CST -----  Contact: PT  Would like to reschedule appt that's on 12.4 to 12.13 early in the day.    Please call [phone]    Thanks  Ana

## 2017-12-12 ENCOUNTER — TELEPHONE (OUTPATIENT)
Dept: GASTROENTEROLOGY | Facility: CLINIC | Age: 62
End: 2017-12-12

## 2017-12-12 NOTE — TELEPHONE ENCOUNTER
----- Message from Amanda Ding sent at 12/12/2017  1:24 PM CST -----  Patient needs a refill on some medication for his stomach and could not remember the name.  Call him at [phone].                                                  maya

## 2017-12-15 ENCOUNTER — OFFICE VISIT (OUTPATIENT)
Dept: GASTROENTEROLOGY | Facility: CLINIC | Age: 62
End: 2017-12-15
Payer: COMMERCIAL

## 2017-12-15 ENCOUNTER — LAB VISIT (OUTPATIENT)
Dept: LAB | Facility: HOSPITAL | Age: 62
End: 2017-12-15
Attending: INTERNAL MEDICINE
Payer: COMMERCIAL

## 2017-12-15 VITALS
DIASTOLIC BLOOD PRESSURE: 80 MMHG | HEART RATE: 74 BPM | BODY MASS INDEX: 37.09 KG/M2 | WEIGHT: 289 LBS | HEIGHT: 74 IN | SYSTOLIC BLOOD PRESSURE: 130 MMHG

## 2017-12-15 DIAGNOSIS — R11.2 NON-INTRACTABLE VOMITING WITH NAUSEA, UNSPECIFIED VOMITING TYPE: ICD-10-CM

## 2017-12-15 DIAGNOSIS — R60.0 BILATERAL LEG EDEMA: ICD-10-CM

## 2017-12-15 DIAGNOSIS — K74.69 OTHER CIRRHOSIS OF LIVER: Primary | ICD-10-CM

## 2017-12-15 DIAGNOSIS — G40.909 SEIZURE DISORDER: ICD-10-CM

## 2017-12-15 DIAGNOSIS — K76.82 HEPATIC ENCEPHALOPATHY: ICD-10-CM

## 2017-12-15 DIAGNOSIS — I25.10 CORONARY ARTERY DISEASE INVOLVING NATIVE CORONARY ARTERY OF NATIVE HEART WITHOUT ANGINA PECTORIS: ICD-10-CM

## 2017-12-15 DIAGNOSIS — K74.60 CIRRHOSIS OF LIVER WITHOUT ASCITES, UNSPECIFIED HEPATIC CIRRHOSIS TYPE: ICD-10-CM

## 2017-12-15 LAB
ALBUMIN SERPL BCP-MCNC: 2.7 G/DL
ALP SERPL-CCNC: 74 U/L
ALT SERPL W/O P-5'-P-CCNC: 51 U/L
ANION GAP SERPL CALC-SCNC: 8 MMOL/L
AST SERPL-CCNC: 85 U/L
BASOPHILS # BLD AUTO: 0.04 K/UL
BASOPHILS NFR BLD: 1.1 %
BILIRUB SERPL-MCNC: 2.7 MG/DL
BUN SERPL-MCNC: 10 MG/DL
CALCIUM SERPL-MCNC: 9.1 MG/DL
CHLORIDE SERPL-SCNC: 108 MMOL/L
CO2 SERPL-SCNC: 23 MMOL/L
CREAT SERPL-MCNC: 1.3 MG/DL
DIFFERENTIAL METHOD: ABNORMAL
EOSINOPHIL # BLD AUTO: 0.1 K/UL
EOSINOPHIL NFR BLD: 2.9 %
ERYTHROCYTE [DISTWIDTH] IN BLOOD BY AUTOMATED COUNT: 14.5 %
EST. GFR  (AFRICAN AMERICAN): >60 ML/MIN/1.73 M^2
EST. GFR  (NON AFRICAN AMERICAN): 59 ML/MIN/1.73 M^2
GLUCOSE SERPL-MCNC: 96 MG/DL
HCT VFR BLD AUTO: 29.7 %
HGB BLD-MCNC: 10.6 G/DL
INR PPP: 1.3
LYMPHOCYTES # BLD AUTO: 0.8 K/UL
LYMPHOCYTES NFR BLD: 22 %
MCH RBC QN AUTO: 35.2 PG
MCHC RBC AUTO-ENTMCNC: 35.7 G/DL
MCV RBC AUTO: 99 FL
MONOCYTES # BLD AUTO: 0.7 K/UL
MONOCYTES NFR BLD: 17.7 %
NEUTROPHILS # BLD AUTO: 2.1 K/UL
NEUTROPHILS NFR BLD: 56.3 %
PLATELET # BLD AUTO: 43 K/UL
PMV BLD AUTO: 11.4 FL
POTASSIUM SERPL-SCNC: 3.5 MMOL/L
PROT SERPL-MCNC: 5.7 G/DL
PROTHROMBIN TIME: 14.6 SEC
RBC # BLD AUTO: 3.01 M/UL
SODIUM SERPL-SCNC: 139 MMOL/L
WBC # BLD AUTO: 3.78 K/UL

## 2017-12-15 PROCEDURE — 36415 COLL VENOUS BLD VENIPUNCTURE: CPT | Mod: PO,TXP

## 2017-12-15 PROCEDURE — 99214 OFFICE O/P EST MOD 30 MIN: CPT | Mod: S$GLB,TXP,, | Performed by: INTERNAL MEDICINE

## 2017-12-15 PROCEDURE — 80053 COMPREHEN METABOLIC PANEL: CPT | Mod: PO,TXP

## 2017-12-15 PROCEDURE — 85610 PROTHROMBIN TIME: CPT | Mod: PO,TXP

## 2017-12-15 PROCEDURE — 85025 COMPLETE CBC W/AUTO DIFF WBC: CPT | Mod: PO,TXP

## 2017-12-15 PROCEDURE — 99999 PR PBB SHADOW E&M-EST. PATIENT-LVL III: CPT | Mod: PBBFAC,TXP,, | Performed by: INTERNAL MEDICINE

## 2017-12-15 RX ORDER — LEVETIRACETAM 500 MG/1
500 TABLET ORAL 2 TIMES DAILY
Qty: 180 TABLET | Refills: 1 | Status: ON HOLD | OUTPATIENT
Start: 2017-12-15 | End: 2018-08-17 | Stop reason: SDUPTHER

## 2017-12-15 RX ORDER — SPIRONOLACTONE 100 MG/1
150 TABLET, FILM COATED ORAL 2 TIMES DAILY
Qty: 90 TABLET | Refills: 5
Start: 2017-12-15 | End: 2017-12-15 | Stop reason: SDUPTHER

## 2017-12-15 RX ORDER — SPIRONOLACTONE 100 MG/1
150 TABLET, FILM COATED ORAL 2 TIMES DAILY
Qty: 270 TABLET | Refills: 1 | Status: ON HOLD
Start: 2017-12-15 | End: 2018-01-27

## 2017-12-15 RX ORDER — FUROSEMIDE 40 MG/1
40 TABLET ORAL 2 TIMES DAILY
Qty: 180 TABLET | Refills: 1
Start: 2017-12-15 | End: 2018-02-16 | Stop reason: SDUPTHER

## 2017-12-15 RX ORDER — LACTULOSE 10 G/15ML
60 SOLUTION ORAL; RECTAL 3 TIMES DAILY
Qty: 5400 ML | Refills: 6 | Status: ON HOLD | OUTPATIENT
Start: 2017-12-15 | End: 2018-08-17 | Stop reason: HOSPADM

## 2017-12-15 RX ORDER — ONDANSETRON 4 MG/1
4 TABLET, FILM COATED ORAL EVERY 8 HOURS PRN
Qty: 15 TABLET | Refills: 3 | Status: SHIPPED | OUTPATIENT
Start: 2017-12-15 | End: 2018-09-07

## 2017-12-15 NOTE — PROGRESS NOTES
Subjective:     Alon Adamson is here for follow up of Cirrhosis      HPI  Since Alon Admason's last visit he has missed his previous follow-up appointments as well as testing.  Seems there were some family issues going on.  During this time he reports feeling well.  He denies having any hospitalizations.  Denies any seizures or significant issues with encephalopathy. No GIB or ascites.  Occasional issues with LE edema.    He did not have his dobutamine stress test done yet.    His main issue is persistent nausea almost daily with occasional vomiting. Denies anorexia or weight loss. Symptoms are mild-moderate in intensity. No alleviating or precipitating factors.    Review of Systems   Constitutional: Positive for activity change and fatigue.   HENT: Negative.    Eyes: Negative.    Respiratory: Negative.    Cardiovascular: Positive for leg swelling.   Gastrointestinal: Positive for nausea and vomiting.   Genitourinary: Negative.    Musculoskeletal: Negative.    Skin: Negative.    Neurological: Negative.    Psychiatric/Behavioral: Negative.        Objective:     Physical Exam   Constitutional: He is oriented to person, place, and time. He appears well-developed and well-nourished. No distress.   HENT:   Head: Normocephalic and atraumatic.   Mouth/Throat: Oropharynx is clear and moist. No oropharyngeal exudate.   Poor dentition   Eyes: Conjunctivae are normal. Pupils are equal, round, and reactive to light. Right eye exhibits no discharge. Left eye exhibits no discharge. No scleral icterus.   Pulmonary/Chest: Effort normal and breath sounds normal. No respiratory distress. He has no wheezes.   Abdominal: Soft. He exhibits no distension. There is no tenderness.   Musculoskeletal: He exhibits edema (1-2+ BLE).   Neurological: He is alert and oriented to person, place, and time.   Psychiatric: He has a normal mood and affect. His behavior is normal.   Vitals reviewed.      MELD-Na score: 16 at 12/15/2017   3:48 PM  MELD score: 16 at 12/15/2017  3:48 PM  Calculated from:  Serum Creatinine: 1.3 mg/dL at 12/15/2017  3:48 PM  Serum Sodium: 139 mmol/L (Rounded to 137) at 12/15/2017  3:48 PM  Total Bilirubin: 2.7 mg/dL at 12/15/2017  3:48 PM  INR(ratio): 1.3 at 12/15/2017  3:48 PM  Age: 62 years    WBC   Date Value Ref Range Status   12/15/2017 3.78 (L) 3.90 - 12.70 K/uL Final     Hemoglobin   Date Value Ref Range Status   12/15/2017 10.6 (L) 14.0 - 18.0 g/dL Final     Hematocrit   Date Value Ref Range Status   12/15/2017 29.7 (L) 40.0 - 54.0 % Final     Platelets   Date Value Ref Range Status   12/15/2017 43 (L) 150 - 350 K/uL Final     BUN, Bld   Date Value Ref Range Status   12/15/2017 10 8 - 23 mg/dL Final     Creatinine   Date Value Ref Range Status   12/15/2017 1.3 0.5 - 1.4 mg/dL Final     Glucose   Date Value Ref Range Status   12/15/2017 96 70 - 110 mg/dL Final     Calcium   Date Value Ref Range Status   12/15/2017 9.1 8.7 - 10.5 mg/dL Final     Sodium   Date Value Ref Range Status   12/15/2017 139 136 - 145 mmol/L Final     Potassium   Date Value Ref Range Status   12/15/2017 3.5 3.5 - 5.1 mmol/L Final     Chloride   Date Value Ref Range Status   12/15/2017 108 95 - 110 mmol/L Final     Magnesium   Date Value Ref Range Status   09/14/2016 1.5 (L) 1.6 - 2.6 mg/dL Final     AST   Date Value Ref Range Status   12/15/2017 85 (H) 10 - 40 U/L Final     ALT   Date Value Ref Range Status   12/15/2017 51 (H) 10 - 44 U/L Final     Alkaline Phosphatase   Date Value Ref Range Status   12/15/2017 74 55 - 135 U/L Final     Total Bilirubin   Date Value Ref Range Status   12/15/2017 2.7 (H) 0.1 - 1.0 mg/dL Final     Comment:     For infants and newborns, interpretation of results should be based  on gestational age, weight and in agreement with clinical  observations.  Premature Infant recommended reference ranges:  Up to 24 hours.............<8.0 mg/dL  Up to 48 hours............<12.0 mg/dL  3-5 days..................<15.0  mg/dL  6-29 days.................<15.0 mg/dL       Albumin   Date Value Ref Range Status   12/15/2017 2.7 (L) 3.5 - 5.2 g/dL Final     INR   Date Value Ref Range Status   12/15/2017 1.3 (H) 0.8 - 1.2 Final     Comment:     Coumadin Therapy:  2.0 - 3.0 for INR for all indicators except mechanical heart valves  and antiphospholipid syndromes which should use 2.5 - 3.5.           Assessment/Plan:     1. Other cirrhosis of liver    2. Bilateral leg edema    3. Coronary artery disease involving native coronary artery of native heart without angina pectoris    4. Hepatic encephalopathy    5. Seizure disorder    6. Non-intractable vomiting with nausea, unspecified vomiting type      Alon Adamson is a 62 y.o. male withCirrhosis    Other cirrhosis of liver-deferred for transplant 2/2 CAD, awaiting repeat dobutamine stress to re-eval  -Will update MELD score  -Needs HCC surveillance  -     US Abdomen Limited; Future; Expected date: 12/15/2017  -     spironolactone (ALDACTONE) 100 MG tablet; Take 1.5 tablets (150 mg total) by mouth 2 (two) times daily.  Dispense: 270 tablet; Refill: 1  -     furosemide (LASIX) 40 MG tablet; Take 1 tablet (40 mg total) by mouth 2 (two) times daily.  Dispense: 180 tablet; Refill: 1    Bilateral leg edema-uncontrolled  -Take diuretics daily  -Low salt diet  -     spironolactone (ALDACTONE) 100 MG tablet; Take 1.5 tablets (150 mg total) by mouth 2 (two) times daily.  Dispense: 270 tablet; Refill: 1    Coronary artery disease involving native coronary artery of native heart without angina pectoris  -Plan for dobutamine stress test to re-eval candidacy for transplant  -Hold beta blocker a week before the procedure    Hepatic encephalopathy-controlled  -     rifAXIMin (XIFAXAN) 550 mg Tab; Take 1 tablet (550 mg total) by mouth 2 (two) times daily.  Dispense: 60 tablet; Refill: 5  -     GENERLAC 10 gram/15 mL solution; Take 60 mLs (40 g total) by mouth 3 (three) times daily. 60mg three times  per day  Dispense: 5400 mL; Refill: 6    Seizure [id number]  -     levETIRAcetam (KEPPRA) 500 MG Tab; Take 1 tablet (500 mg total) by mouth 2 (two) times daily.  Dispense: 180 tablet; Refill: 1    Non-intractable vomiting with nausea, unspecified vomiting type-unclear etiology  -Advised he have EGD done by local GI doctor  -Consider GES if above eval unremarkable  -     ondansetron (ZOFRAN) 4 MG tablet; Take 1 tablet (4 mg total) by mouth every 8 (eight) hours as needed for Nausea.  Dispense: 15 tablet; Refill: 3    RTC in 3 months    Patient was [facility] in [facility].      Miriam García MD

## 2017-12-26 ENCOUNTER — TELEPHONE (OUTPATIENT)
Dept: PHARMACY | Facility: CLINIC | Age: 62
End: 2017-12-26

## 2017-12-28 ENCOUNTER — TELEPHONE (OUTPATIENT)
Dept: RADIOLOGY | Facility: HOSPITAL | Age: 62
End: 2017-12-28

## 2017-12-29 ENCOUNTER — HOSPITAL ENCOUNTER (OUTPATIENT)
Dept: RADIOLOGY | Facility: HOSPITAL | Age: 62
Discharge: HOME | End: 2017-12-29
Attending: INTERNAL MEDICINE
Payer: COMMERCIAL

## 2017-12-29 DIAGNOSIS — K74.69 OTHER CIRRHOSIS OF LIVER: ICD-10-CM

## 2017-12-29 PROCEDURE — 76705 ECHO EXAM OF ABDOMEN: CPT | Mod: TC,PO,TXP

## 2017-12-29 PROCEDURE — 76705 ECHO EXAM OF ABDOMEN: CPT | Mod: 26,TXP,, | Performed by: RADIOLOGY

## 2017-12-31 RX ORDER — ATORVASTATIN CALCIUM 40 MG/1
TABLET, FILM COATED ORAL
Qty: 30 TABLET | Refills: 0 | Status: ON HOLD | OUTPATIENT
Start: 2017-12-31 | End: 2018-08-17 | Stop reason: SDUPTHER

## 2018-01-20 ENCOUNTER — HOSPITAL ENCOUNTER (INPATIENT)
Facility: HOSPITAL | Age: 63
LOS: 6 days | Discharge: HOME | DRG: 433 | End: 2018-01-27
Attending: EMERGENCY MEDICINE | Admitting: HOSPITALIST
Payer: COMMERCIAL

## 2018-01-20 DIAGNOSIS — K74.69 OTHER CIRRHOSIS OF LIVER: ICD-10-CM

## 2018-01-20 DIAGNOSIS — K72.00 ACUTE LIVER FAILURE WITHOUT HEPATIC COMA: Primary | ICD-10-CM

## 2018-01-20 DIAGNOSIS — N28.9 ACUTE RENAL INSUFFICIENCY: ICD-10-CM

## 2018-01-20 DIAGNOSIS — R60.0 BILATERAL LEG EDEMA: ICD-10-CM

## 2018-01-20 DIAGNOSIS — R06.02 SHORTNESS OF BREATH: ICD-10-CM

## 2018-01-20 DIAGNOSIS — D64.9 ANEMIA, UNSPECIFIED TYPE: ICD-10-CM

## 2018-01-20 DIAGNOSIS — Z76.82 LIVER TRANSPLANT CANDIDATE: ICD-10-CM

## 2018-01-20 DIAGNOSIS — I51.7 CARDIOMEGALY: ICD-10-CM

## 2018-01-20 DIAGNOSIS — R18.8 OTHER ASCITES: ICD-10-CM

## 2018-01-20 DIAGNOSIS — R60.1 ANASARCA: ICD-10-CM

## 2018-01-20 DIAGNOSIS — D63.8 ANEMIA OF CHRONIC DISEASE: ICD-10-CM

## 2018-01-20 PROCEDURE — 82140 ASSAY OF AMMONIA: CPT | Mod: NTX

## 2018-01-20 PROCEDURE — 86920 COMPATIBILITY TEST SPIN: CPT | Mod: NTX

## 2018-01-20 PROCEDURE — 86901 BLOOD TYPING SEROLOGIC RH(D): CPT | Mod: NTX

## 2018-01-20 PROCEDURE — 83690 ASSAY OF LIPASE: CPT | Mod: NTX

## 2018-01-20 PROCEDURE — 85610 PROTHROMBIN TIME: CPT | Mod: NTX

## 2018-01-20 PROCEDURE — 83880 ASSAY OF NATRIURETIC PEPTIDE: CPT | Mod: NTX

## 2018-01-20 PROCEDURE — 93005 ELECTROCARDIOGRAM TRACING: CPT | Mod: NTX

## 2018-01-20 PROCEDURE — 94761 N-INVAS EAR/PLS OXIMETRY MLT: CPT | Mod: NTX

## 2018-01-20 PROCEDURE — 83735 ASSAY OF MAGNESIUM: CPT | Mod: NTX

## 2018-01-20 PROCEDURE — 85025 COMPLETE CBC W/AUTO DIFF WBC: CPT | Mod: NTX

## 2018-01-20 PROCEDURE — 99285 EMERGENCY DEPT VISIT HI MDM: CPT | Mod: 25,NTX

## 2018-01-20 PROCEDURE — 85730 THROMBOPLASTIN TIME PARTIAL: CPT | Mod: NTX

## 2018-01-20 PROCEDURE — 93010 ELECTROCARDIOGRAM REPORT: CPT | Mod: NTX,,, | Performed by: INTERNAL MEDICINE

## 2018-01-20 PROCEDURE — 99285 EMERGENCY DEPT VISIT HI MDM: CPT | Mod: NTX,,, | Performed by: EMERGENCY MEDICINE

## 2018-01-20 PROCEDURE — 80053 COMPREHEN METABOLIC PANEL: CPT | Mod: NTX

## 2018-01-20 PROCEDURE — 84484 ASSAY OF TROPONIN QUANT: CPT | Mod: NTX

## 2018-01-21 PROBLEM — K72.90 DECOMPENSATED HEPATIC CIRRHOSIS: Status: ACTIVE | Noted: 2018-01-21

## 2018-01-21 PROBLEM — L01.00 IMPETIGO: Status: ACTIVE | Noted: 2018-01-21

## 2018-01-21 PROBLEM — N17.9 AKI (ACUTE KIDNEY INJURY): Status: ACTIVE | Noted: 2018-01-21

## 2018-01-21 PROBLEM — K74.60 DECOMPENSATED HEPATIC CIRRHOSIS: Status: ACTIVE | Noted: 2018-01-21

## 2018-01-21 PROBLEM — K72.00 ACUTE LIVER FAILURE WITHOUT HEPATIC COMA: Status: ACTIVE | Noted: 2018-01-21

## 2018-01-21 LAB
ABO + RH BLD: NORMAL
ALBUMIN SERPL BCP-MCNC: 2.2 G/DL
ALP SERPL-CCNC: 100 U/L
ALT SERPL W/O P-5'-P-CCNC: 34 U/L
AMMONIA PLAS-SCNC: 69 UMOL/L
ANION GAP SERPL CALC-SCNC: 8 MMOL/L
APTT BLDCRRT: 25.7 SEC
AST SERPL-CCNC: 59 U/L
BASOPHILS # BLD AUTO: 0.04 K/UL
BASOPHILS NFR BLD: 0.9 %
BILIRUB SERPL-MCNC: 2 MG/DL
BLD GP AB SCN CELLS X3 SERPL QL: NORMAL
BNP SERPL-MCNC: 88 PG/ML
BUN SERPL-MCNC: 16 MG/DL
CALCIUM SERPL-MCNC: 8.6 MG/DL
CHLORIDE SERPL-SCNC: 113 MMOL/L
CO2 SERPL-SCNC: 17 MMOL/L
CREAT SERPL-MCNC: 1.8 MG/DL
DIFFERENTIAL METHOD: ABNORMAL
EOSINOPHIL # BLD AUTO: 0.1 K/UL
EOSINOPHIL NFR BLD: 2 %
ERYTHROCYTE [DISTWIDTH] IN BLOOD BY AUTOMATED COUNT: 15.2 %
EST. GFR  (AFRICAN AMERICAN): 45.9 ML/MIN/1.73 M^2
EST. GFR  (NON AFRICAN AMERICAN): 39.7 ML/MIN/1.73 M^2
ESTIMATED AVG GLUCOSE: 91 MG/DL
FERRITIN SERPL-MCNC: 71 NG/ML
FIBRINOGEN PPP-MCNC: 205 MG/DL
FOLATE SERPL-MCNC: 7.9 NG/ML
GLUCOSE SERPL-MCNC: 137 MG/DL
HAPTOGLOB SERPL-MCNC: 63 MG/DL
HBA1C MFR BLD HPLC: 4.8 %
HCT VFR BLD AUTO: 24.4 %
HGB BLD-MCNC: 8 G/DL
IMM GRANULOCYTES # BLD AUTO: 0.02 K/UL
IMM GRANULOCYTES NFR BLD AUTO: 0.4 %
INR PPP: 1.4
IRON SERPL-MCNC: 56 UG/DL
LDH SERPL L TO P-CCNC: 204 U/L
LIPASE SERPL-CCNC: 190 U/L
LYMPHOCYTES # BLD AUTO: 0.8 K/UL
LYMPHOCYTES NFR BLD: 18.4 %
MAGNESIUM SERPL-MCNC: 1.7 MG/DL
MCH RBC QN AUTO: 34.9 PG
MCHC RBC AUTO-ENTMCNC: 32.8 G/DL
MCV RBC AUTO: 107 FL
MONOCYTES # BLD AUTO: 0.8 K/UL
MONOCYTES NFR BLD: 18.2 %
NEUTROPHILS # BLD AUTO: 2.7 K/UL
NEUTROPHILS NFR BLD: 60.1 %
NRBC BLD-RTO: 0 /100 WBC
PLATELET # BLD AUTO: 83 K/UL
PMV BLD AUTO: 12.2 FL
POCT GLUCOSE: 167 MG/DL (ref 70–110)
POCT GLUCOSE: 170 MG/DL (ref 70–110)
POCT GLUCOSE: 89 MG/DL (ref 70–110)
POTASSIUM SERPL-SCNC: 4.2 MMOL/L
PROCALCITONIN SERPL IA-MCNC: 0.1 NG/ML
PROT SERPL-MCNC: 5.4 G/DL
PROTHROMBIN TIME: 14.3 SEC
RBC # BLD AUTO: 2.29 M/UL
SATURATED IRON: 25 %
SODIUM SERPL-SCNC: 138 MMOL/L
TOTAL IRON BINDING CAPACITY: 228 UG/DL
TRANSFERRIN SERPL-MCNC: 154 MG/DL
TROPONIN I SERPL DL<=0.01 NG/ML-MCNC: 0.01 NG/ML
VIT B12 SERPL-MCNC: 715 PG/ML
WBC # BLD AUTO: 4.56 K/UL

## 2018-01-21 PROCEDURE — 80321 ALCOHOLS BIOMARKERS 1OR 2: CPT | Mod: NTX

## 2018-01-21 PROCEDURE — 85384 FIBRINOGEN ACTIVITY: CPT | Mod: NTX

## 2018-01-21 PROCEDURE — 84145 PROCALCITONIN (PCT): CPT | Mod: NTX

## 2018-01-21 PROCEDURE — C9113 INJ PANTOPRAZOLE SODIUM, VIA: HCPCS | Mod: NTX | Performed by: HOSPITALIST

## 2018-01-21 PROCEDURE — 36415 COLL VENOUS BLD VENIPUNCTURE: CPT | Mod: NTX

## 2018-01-21 PROCEDURE — 63600175 PHARM REV CODE 636 W HCPCS: Mod: NTX | Performed by: HOSPITALIST

## 2018-01-21 PROCEDURE — 83615 LACTATE (LD) (LDH) ENZYME: CPT | Mod: NTX

## 2018-01-21 PROCEDURE — 82607 VITAMIN B-12: CPT | Mod: NTX

## 2018-01-21 PROCEDURE — 83036 HEMOGLOBIN GLYCOSYLATED A1C: CPT | Mod: NTX

## 2018-01-21 PROCEDURE — 82746 ASSAY OF FOLIC ACID SERUM: CPT | Mod: NTX

## 2018-01-21 PROCEDURE — 82728 ASSAY OF FERRITIN: CPT | Mod: NTX

## 2018-01-21 PROCEDURE — 99255 IP/OBS CONSLTJ NEW/EST HI 80: CPT | Mod: NTX,,, | Performed by: INTERNAL MEDICINE

## 2018-01-21 PROCEDURE — 20600001 HC STEP DOWN PRIVATE ROOM: Mod: NTX

## 2018-01-21 PROCEDURE — 25000003 PHARM REV CODE 250: Mod: NTX | Performed by: HOSPITALIST

## 2018-01-21 PROCEDURE — 83540 ASSAY OF IRON: CPT | Mod: NTX

## 2018-01-21 PROCEDURE — 83010 ASSAY OF HAPTOGLOBIN QUANT: CPT | Mod: NTX

## 2018-01-21 PROCEDURE — 99223 1ST HOSP IP/OBS HIGH 75: CPT | Mod: NTX,,, | Performed by: HOSPITALIST

## 2018-01-21 PROCEDURE — 25000003 PHARM REV CODE 250: Mod: NTX | Performed by: PHYSICIAN ASSISTANT

## 2018-01-21 PROCEDURE — P9047 ALBUMIN (HUMAN), 25%, 50ML: HCPCS | Mod: JG,NTX | Performed by: HOSPITALIST

## 2018-01-21 RX ORDER — ASPIRIN 81 MG/1
81 TABLET ORAL DAILY
Status: DISCONTINUED | OUTPATIENT
Start: 2018-01-21 | End: 2018-01-22

## 2018-01-21 RX ORDER — IBUPROFEN 200 MG
24 TABLET ORAL
Status: DISCONTINUED | OUTPATIENT
Start: 2018-01-21 | End: 2018-01-27 | Stop reason: HOSPADM

## 2018-01-21 RX ORDER — LEVETIRACETAM 500 MG/1
500 TABLET ORAL 2 TIMES DAILY
Status: DISCONTINUED | OUTPATIENT
Start: 2018-01-21 | End: 2018-01-27 | Stop reason: HOSPADM

## 2018-01-21 RX ORDER — LACTULOSE 10 G/15ML
40 SOLUTION ORAL 3 TIMES DAILY
Status: DISCONTINUED | OUTPATIENT
Start: 2018-01-21 | End: 2018-01-22

## 2018-01-21 RX ORDER — MICONAZOLE NITRATE 2 %
POWDER (GRAM) TOPICAL ONCE
Status: COMPLETED | OUTPATIENT
Start: 2018-01-21 | End: 2018-01-21

## 2018-01-21 RX ORDER — FERROUS SULFATE 325(65) MG
325 TABLET, DELAYED RELEASE (ENTERIC COATED) ORAL DAILY
Status: DISCONTINUED | OUTPATIENT
Start: 2018-01-21 | End: 2018-01-27 | Stop reason: HOSPADM

## 2018-01-21 RX ORDER — PANTOPRAZOLE SODIUM 40 MG/10ML
80 INJECTION, POWDER, LYOPHILIZED, FOR SOLUTION INTRAVENOUS ONCE
Status: COMPLETED | OUTPATIENT
Start: 2018-01-21 | End: 2018-01-21

## 2018-01-21 RX ORDER — ATORVASTATIN CALCIUM 20 MG/1
40 TABLET, FILM COATED ORAL DAILY
Status: DISCONTINUED | OUTPATIENT
Start: 2018-01-21 | End: 2018-01-27 | Stop reason: HOSPADM

## 2018-01-21 RX ORDER — IBUPROFEN 200 MG
16 TABLET ORAL
Status: DISCONTINUED | OUTPATIENT
Start: 2018-01-21 | End: 2018-01-27 | Stop reason: HOSPADM

## 2018-01-21 RX ORDER — PANTOPRAZOLE SODIUM 40 MG/10ML
40 INJECTION, POWDER, LYOPHILIZED, FOR SOLUTION INTRAVENOUS 2 TIMES DAILY
Status: DISCONTINUED | OUTPATIENT
Start: 2018-01-21 | End: 2018-01-22

## 2018-01-21 RX ORDER — ALBUMIN HUMAN 250 G/1000ML
25 SOLUTION INTRAVENOUS 3 TIMES DAILY
Status: COMPLETED | OUTPATIENT
Start: 2018-01-21 | End: 2018-01-21

## 2018-01-21 RX ORDER — INSULIN ASPART 100 [IU]/ML
0-5 INJECTION, SOLUTION INTRAVENOUS; SUBCUTANEOUS
Status: DISCONTINUED | OUTPATIENT
Start: 2018-01-21 | End: 2018-01-27 | Stop reason: HOSPADM

## 2018-01-21 RX ORDER — GLUCAGON 1 MG
1 KIT INJECTION
Status: DISCONTINUED | OUTPATIENT
Start: 2018-01-21 | End: 2018-01-27 | Stop reason: HOSPADM

## 2018-01-21 RX ORDER — SODIUM CHLORIDE 0.9 % (FLUSH) 0.9 %
5 SYRINGE (ML) INJECTION
Status: DISCONTINUED | OUTPATIENT
Start: 2018-01-21 | End: 2018-01-24

## 2018-01-21 RX ORDER — OCTREOTIDE ACETATE 100 UG/ML
100 INJECTION, SOLUTION INTRAVENOUS; SUBCUTANEOUS EVERY 8 HOURS
Status: DISCONTINUED | OUTPATIENT
Start: 2018-01-21 | End: 2018-01-22

## 2018-01-21 RX ORDER — METOPROLOL TARTRATE 25 MG/1
12.5 TABLET ORAL 2 TIMES DAILY
Status: DISCONTINUED | OUTPATIENT
Start: 2018-01-21 | End: 2018-01-21

## 2018-01-21 RX ORDER — LANOLIN ALCOHOL/MO/W.PET/CERES
400 CREAM (GRAM) TOPICAL DAILY
Status: DISCONTINUED | OUTPATIENT
Start: 2018-01-21 | End: 2018-01-27 | Stop reason: HOSPADM

## 2018-01-21 RX ORDER — FUROSEMIDE 10 MG/ML
40 INJECTION INTRAMUSCULAR; INTRAVENOUS 2 TIMES DAILY
Status: DISCONTINUED | OUTPATIENT
Start: 2018-01-21 | End: 2018-01-21

## 2018-01-21 RX ADMIN — DOCUSATE SODIUM 250 MG: 50 CAPSULE, LIQUID FILLED ORAL at 08:01

## 2018-01-21 RX ADMIN — ATORVASTATIN CALCIUM 40 MG: 20 TABLET, FILM COATED ORAL at 08:01

## 2018-01-21 RX ADMIN — LACTULOSE 40 G: 20 SOLUTION ORAL at 09:01

## 2018-01-21 RX ADMIN — METOPROLOL TARTRATE 12.5 MG: 25 TABLET, FILM COATED ORAL at 08:01

## 2018-01-21 RX ADMIN — ALBUMIN (HUMAN) 25 G: 12.5 SOLUTION INTRAVENOUS at 09:01

## 2018-01-21 RX ADMIN — PANTOPRAZOLE SODIUM 80 MG: 40 INJECTION, POWDER, FOR SOLUTION INTRAVENOUS at 01:01

## 2018-01-21 RX ADMIN — MICONAZOLE NITRATE: 20 POWDER TOPICAL at 10:01

## 2018-01-21 RX ADMIN — VANCOMYCIN HYDROCHLORIDE 2000 MG: 1 INJECTION, POWDER, LYOPHILIZED, FOR SOLUTION INTRAVENOUS at 05:01

## 2018-01-21 RX ADMIN — OCTREOTIDE ACETATE 100 MCG: 100 INJECTION, SOLUTION INTRAVENOUS; SUBCUTANEOUS at 05:01

## 2018-01-21 RX ADMIN — LEVETIRACETAM 500 MG: 500 TABLET ORAL at 08:01

## 2018-01-21 RX ADMIN — FERROUS SULFATE TAB EC 325 MG (65 MG FE EQUIVALENT) 325 MG: 325 (65 FE) TABLET DELAYED RESPONSE at 08:01

## 2018-01-21 RX ADMIN — RIFAXIMIN 550 MG: 550 TABLET ORAL at 08:01

## 2018-01-21 RX ADMIN — ASPIRIN 81 MG: 81 TABLET, COATED ORAL at 08:01

## 2018-01-21 RX ADMIN — LEVETIRACETAM 500 MG: 500 TABLET ORAL at 09:01

## 2018-01-21 RX ADMIN — ALBUMIN (HUMAN) 25 G: 12.5 SOLUTION INTRAVENOUS at 10:01

## 2018-01-21 RX ADMIN — LACTULOSE 40 G: 20 SOLUTION ORAL at 05:01

## 2018-01-21 RX ADMIN — PANTOPRAZOLE SODIUM 40 MG: 40 INJECTION, POWDER, FOR SOLUTION INTRAVENOUS at 09:01

## 2018-01-21 RX ADMIN — OCTREOTIDE ACETATE 100 MCG: 100 INJECTION, SOLUTION INTRAVENOUS; SUBCUTANEOUS at 09:01

## 2018-01-21 RX ADMIN — ALBUMIN (HUMAN) 25 G: 12.5 SOLUTION INTRAVENOUS at 01:01

## 2018-01-21 RX ADMIN — RIFAXIMIN 550 MG: 550 TABLET ORAL at 09:01

## 2018-01-21 RX ADMIN — LACTULOSE 40 G: 20 SOLUTION ORAL at 01:01

## 2018-01-21 RX ADMIN — MAGNESIUM OXIDE TAB 400 MG (241.3 MG ELEMENTAL MG) 400 MG: 400 (241.3 MG) TAB at 08:01

## 2018-01-21 NOTE — HPI
This is a 61 y/o male with hx of etoh cirrhosis complicated by LE edema, hepatic encephalopathy (followed by Dr. García, splenic artery aneurysm s/p coiling in 2016, deferred for transplant eval currently due to concern for CAD pending dobutamine stress test),  who presents with scrotal and abdominal swelling x 1 week. Assoc with SOB and AYALA. He has noted some dark stools intermittently. Has had worsening redness and weeping over his bilatearl shins.   Initially presented to [facility] and was directed to [facility] for further care.   Admit to taking his lasix 40mg BID and aldactone 150mg bid as well

## 2018-01-21 NOTE — HPI
"62M with cirrhosis presents with recent worsening of abdominal distension and lower extremity edema over the past 1-2 weeks.  Previously his diuretic regimen of lasix 40mg bid and spironolactone 150mg bid was effective in keeping excess fluid off, and he has never been this edematous before.  Other than this he denies any other complaints.  Upon more targeted questioning he admits that about 2 weeks ago his venous stasis ulcers on his shins were more warm and erythematous, and he had some drainage from wounds on his right shin.  He thinks he may have had some fevers then, but those have since resolved and the redness is improved.  His son has been bandaging the wounds and applying silvadene to them.  He does admit to being a bit more fatigued lately.  Appetite has been non-existent for "years" but he makes himself eat regularly.  He denies any hypoglycemic episodes.  He has nearly completed transplant workup which was deferred in 12/2016 for a positive nuclear stress test, for which he underwent PCI and stenting in 2/2017.  Re-presentation to selection committee is pending follow-up repeat stress test which has yet to be completed due to scheduling conflicts on account of family issues.  He has not experienced any cardiovascular symptoms.    "

## 2018-01-21 NOTE — ASSESSMENT & PLAN NOTE
Hx of Etoh cirrhosis undergoing transplant eval. Needs dobutamine stress to complete eval.  Currently admitted with SOB , LE edema, suspected cellulitis pretibially.  Some concern on labs including anemia from baseline and VICKY    Recs:  Urine studies including Heather, UCr, Uurea, UA  Hold diuretics  Give albumin  TID today  Check PETH test  Anemia workup with B12, folate, iron studies, hemolysis labs  Watch pace /character of stooling  Lactulose and rifaximin  Wound care  C/s  IV Abx with vancomycin for cellulitis pretibially  Social work consult for transplant eval - I e what has been barrier to patient getting his final testing done    Consider dobutamine stress testing while here, pending social work eval.

## 2018-01-21 NOTE — ED TRIAGE NOTES
Patient identifiers verified and correct for Alon Adamson  C/C: scrotal swelling, SOB that started about a week ago   APPEARANCE: awake and alert in NAD.  SKIN: warm, dry and intact. No breakdown or bruising.  MUSCULOSKELETAL: Patient moving all extremities spontaneously, no obvious swelling or deformities noted. Ambulates independently.  RESPIRATORY: Respirations labored. +SOB  CARDIAC: 2+ distal pulses; no peripheral edema. Denies chest pain.  ABDOMEN: Denies nausea, vomiting or diarrhea  : voids spontaneously, denies difficulty  Neurologic: AAO x 4; follows commands equal strength in all extremities; denies numbness/tingling. Denies dizziness.

## 2018-01-21 NOTE — ASSESSMENT & PLAN NOTE
S/p PCI 2/2017.  Took dual antiplatelet therapy x 1 month afterwards, but just on aspirin, statin, and beta-blocker now.  Needs repeat pharmacologic stress echo to be re-presented to selection committee.

## 2018-01-21 NOTE — PROGRESS NOTES
MELD-Na score: 18 at 1/20/2018 11:30 PM  MELD score: 18 at 1/20/2018 11:30 PM  Calculated from:  Serum Creatinine: 1.8 mg/dL at 1/20/2018 11:30 PM  Serum Sodium: 138 mmol/L (Rounded to 137) at 1/20/2018 11:30 PM  Total Bilirubin: 2.0 mg/dL at 1/20/2018 11:30 PM  INR(ratio): 1.4 at 1/20/2018 11:30 PM  Age: 62 years    Patient has been describing AYALA for the last few weeks. Does state having some black tarry and maroon colored stools two weeks ago.  Hemoglobin 1 month ago was 10.6, today it is 8.  States having liquid BMs recently.  Was apparently scheduled for outpatient EGD this week for variceal bleeding.  Will start on protonix and octreotide and will make NPO after midnight for possible endoscopic evaluation, discussed this with hepatology.    - will also check hemolysis labs, iron panel, folate and B12; no emergent need for blood transfusion currently, however with h/o CAD, patient may benefit from 1 unit, will re-evaluate in the AM  - getting abdomen U/S with liver dopplers and testicular U/S as patient has new testicular swelling and TTP  - will stop diuretics and begin alubmin with patient's VICKY

## 2018-01-21 NOTE — PLAN OF CARE
Problem: Patient Care Overview  Goal: Plan of Care Review  Patient is AAOx4. Bed low, locked, call bell within reach, non skid socks on. Patient educated to use call bell for assistance, verbalized understanding. Patient remains free from falls or injury so far this shift. Patient ambulates with standby assistance.   Afebrile, WBC 4.5 - continuing IV vancomycin.   Telemetry monitoring SR 70's-80's.   No complaints of pain or nausea.   Continuing IV albumin - urine output 200 cc so far this shift.   Accuchecks AC/HS - no SSI needed so far this shift.   Patient reported 1 bowel movement so far this shift.   Patient NPO for abdominal ultrasound scheduled for tonight.   Patient to be NPO after midnight for possible EGD. Patient started on IV Protonix and octreotide.   Wound care consult placed for BLE venous stasis ulcers.   See flow sheet for complete assessment details.

## 2018-01-21 NOTE — ASSESSMENT & PLAN NOTE
On rifaximin and lactulose for chronic prophylaxis.  He didn't get it for most of yesterday and shows mild asterixis on exam; resume home doses for now unless symptoms worsen.

## 2018-01-21 NOTE — ASSESSMENT & PLAN NOTE
Unclear whether due to hepatic decompensation, recent/ongoing bacterial infection, medication side effect, or other etiology.  Will monitor creatinine while attempting to diurese the excess edema.  Can consider trying albumin infusion to increase oncotic pressure if creatinine does not improve.

## 2018-01-21 NOTE — SUBJECTIVE & OBJECTIVE
Past Medical History:   Diagnosis Date    Anticoagulant long-term use     Arthritis     Back pain     Cellulitis     Cirrhosis     Coronary artery disease     DM (diabetes mellitus)     Hearing loss     right ear    Hepatic encephalopathy     Hypertension     diagnosed today (11/25/2015) and prescribed toprol    Leg edema     Nephrolithiasis     JACK (obstructive sleep apnea)     Seizures     Splenomegaly        Past Surgical History:   Procedure Laterality Date    APPENDECTOMY      Open    BACK SURGERY      CHOLECYSTECTOMY      laprascopic    LUMBAR DISCECTOMY      SPLENIC ARTERY EMBOLIZATION  04/08/2016    Dr Taveras    TONSILLECTOMY         Review of patient's allergies indicates:   Allergen Reactions    Nsaids (non-steroidal anti-inflammatory drug)      D/t to liver disease.    Tylenol [acetaminophen]      D/t liver disease.    Penicillins Other (See Comments)     Since childhood was told not to take it.       No current facility-administered medications on file prior to encounter.      Current Outpatient Prescriptions on File Prior to Encounter   Medication Sig    ascorbic acid (VITAMIN C) 1000 MG tablet Take 1,000 mg by mouth once daily.    aspirin (ECOTRIN) 81 MG EC tablet Take 81 mg by mouth once daily.    atorvastatin (LIPITOR) 40 MG tablet TAKE 1 TABLET(40 MG) BY MOUTH EVERY DAY    docusate sodium (COLACE) 100 MG capsule Take 240 mg by mouth once daily.    ferrous sulfate 325 mg (65 mg iron) Tab tablet Take 325 mg by mouth daily with breakfast.    fish oil-omega-3 fatty acids 300-1,000 mg capsule Take 2 capsules (2 g total) by mouth once daily.    furosemide (LASIX) 40 MG tablet Take 1 tablet (40 mg total) by mouth 2 (two) times daily.    GENERLAC 10 gram/15 mL solution Take 60 mLs (40 g total) by mouth 3 (three) times daily. 60mg three times per day    glyBURIDE-metformin 2.5-500 mg (GLUCOVANCE) 2.5-500 mg per tablet Take 2 tablets by mouth once daily. & 1 tab po PM     levETIRAcetam (KEPPRA) 500 MG Tab Take 1 tablet (500 mg total) by mouth 2 (two) times daily.    magnesium oxide (MAG-OX) 400 mg tablet Take 1 tablet (400 mg total) by mouth once daily.    melatonin 3 mg Tab Take 3 mg by mouth 2 (two) times daily as needed.    metoprolol tartrate (LOPRESSOR) 25 MG tablet Take 0.5 tablets (12.5 mg total) by mouth 2 (two) times daily.    ondansetron (ZOFRAN) 4 MG tablet Take 1 tablet (4 mg total) by mouth every 8 (eight) hours as needed for Nausea.    rifAXIMin (XIFAXAN) 550 mg Tab Take 1 tablet (550 mg total) by mouth 2 (two) times daily.    spironolactone (ALDACTONE) 100 MG tablet Take 1.5 tablets (150 mg total) by mouth 2 (two) times daily.    tramadol (ULTRAM) 50 mg tablet Take 50 mg by mouth every 6 (six) hours as needed for Pain.     Family History     None        Social History Main Topics    Smoking status: Never Smoker    Smokeless tobacco: Current User     Types: Chew    Alcohol use No    Drug use: No    Sexual activity: Not on file     Review of Systems   Constitutional: Positive for fatigue and fever. Negative for appetite change.   HENT: Negative for mouth sores and nosebleeds.    Eyes: Negative for photophobia and visual disturbance.   Respiratory: Negative for cough and shortness of breath.    Cardiovascular: Positive for leg swelling. Negative for chest pain.   Gastrointestinal: Positive for abdominal distention and abdominal pain. Negative for nausea and vomiting.   Endocrine: Negative for polydipsia and polyuria.   Genitourinary: Positive for scrotal swelling. Negative for dysuria and hematuria.   Musculoskeletal: Negative for arthralgias and myalgias.   Skin: Positive for color change and wound.   Neurological: Negative for dizziness, syncope, light-headedness and headaches.   Hematological: Bruises/bleeds easily.     Objective:     Vital Signs (Most Recent):  Temp: 98.1 °F (36.7 °C) (01/21/18 0408)  Pulse: 78 (01/21/18 0700)  Resp: 20 (01/21/18  0408)  BP: 133/63 (01/21/18 0408)  SpO2: 100 % (01/21/18 0408) Vital Signs (24h Range):  Temp:  [97.7 °F (36.5 °C)-98.1 °F (36.7 °C)] 98.1 °F (36.7 °C)  Pulse:  [74-97] 78  Resp:  [18-20] 20  SpO2:  [99 %-100 %] 100 %  BP: (121-151)/(58-65) 133/63     Weight: (!) 141.1 kg (311 lb 1.1 oz)  Body mass index is 39.94 kg/m².    Physical Exam   Constitutional: He is oriented to person, place, and time. He appears well-developed. He is sleeping. He is easily aroused. He appears ill.   Obese   HENT:   Head: Normocephalic and atraumatic.   Mouth/Throat: Oropharynx is clear and moist.   Eyes: Conjunctivae and EOM are normal. Pupils are equal, round, and reactive to light. No scleral icterus.   Neck: Normal range of motion. Neck supple. No JVD present.   Cardiovascular: Normal rate and intact distal pulses.  Exam reveals no gallop and no friction rub.    No murmur heard.  Pulmonary/Chest: Effort normal and breath sounds normal.   Abdominal: Soft. Bowel sounds are normal. He exhibits distension. There is no tenderness. There is no rebound and no guarding.   Musculoskeletal: He exhibits edema (moderate BLE edema). He exhibits no tenderness.   Lymphadenopathy:     He has no cervical adenopathy.   Neurological: He is alert, oriented to person, place, and time and easily aroused. No cranial nerve deficit.   Mild asterixis present   Skin: Skin is warm and dry.   Venous stasis dermatitis present on BLE with chronic ulcers.  Both anterior shins are deeply erythematous, R>L.  Erythematous area of RLE notably warmer than on LLE.  There is additionally honey-crusting present over the affected area on RLE resembling impetigo   Nursing note and vitals reviewed.        CRANIAL NERVES     CN III, IV, VI   Pupils are equal, round, and reactive to light.  Extraocular motions are normal.        Significant Labs:   CBC:   Recent Labs  Lab 01/20/18  2330   WBC 4.56   HGB 8.0*   HCT 24.4*   PLT 83*     CMP:   Recent Labs  Lab 01/20/18  2330   NA  138   K 4.2   *   CO2 17*   *   BUN 16   CREATININE 1.8*   CALCIUM 8.6*   PROT 5.4*   ALBUMIN 2.2*   BILITOT 2.0*   ALKPHOS 100   AST 59*   ALT 34   ANIONGAP 8   EGFRNONAA 39.7*     Coagulation:   Recent Labs  Lab 01/20/18  2330   INR 1.4*   APTT 25.7       Significant Imaging: n/a

## 2018-01-21 NOTE — PLAN OF CARE
Problem: Patient Care Overview  Goal: Plan of Care Review  Outcome: Ongoing (interventions implemented as appropriate)  Pt AAOx4, VSS, afebrile.  Denies c/o pain.  Up to restroom independently.  Fall risk precautions initiated.  Pt in lowest bed position setting, lighting adjusted, pt to wear nonskid socks when ambulating, side rails up x2.  Pt remain free from falls during shift. Pt verbalize understanding to call when needed assistance.  Call light within reach.  Will continue to monitor.

## 2018-01-21 NOTE — ASSESSMENT & PLAN NOTE
Secondary to hepatic decompensation and hypoalbuminemia on top of chronic venous stasis.  Will attempt mild diuresis with IV lasix 40mg bid and continuing po spironolactone.  Start protein supplementation to try to increase albumin.  Wound Care consult for management of venous stasis ulcers.

## 2018-01-21 NOTE — ED PROVIDER NOTES
Encounter Date: 1/20/2018    SCRIBE #1 NOTE: I, Fadumo Cleveland, am scribing for, and in the presence of,  Dr. Milian. I have scribed the following portions of the note - the EKG reading.       History     Chief Complaint   Patient presents with    Ascites     Liver patient. Scrotal and abdominal swelling     HPI   Pt with PMH as detailed and reviewed below presents with scrotal and abdominal swelling.  Went to a clinic in [place], [place] and was referred down here for further eval for likely swelling related to worsening liver function.    He states the worsening swelling started about a week ago.    He does endorse some SOB.  Denies fever, chills, N/V/D, CP, HA, new neurological deficits.    He's been taking his diuretics as prescribed.  WAs on 60mg Lasix BID, then about a year ago they took him down to 40mg Lasix BID.    In December he had an U/S that didn't show any acites.  The abd swelling and scrotal swelling is very new and alarmed him and his wife.      Review of patient's allergies indicates:   Allergen Reactions    Nsaids (non-steroidal anti-inflammatory drug)      D/t to liver disease.    Tylenol [acetaminophen]      D/t liver disease.    Penicillins Other (See Comments)     Since childhood was told not to take it.     Past Medical History:   Diagnosis Date    Anticoagulant long-term use     Arthritis     Back pain     Cellulitis     Cirrhosis     Coronary artery disease     DM (diabetes mellitus)     Hearing loss     right ear    Hepatic encephalopathy     Hypertension     diagnosed today (11/25/2015) and prescribed toprol    Leg edema     Nephrolithiasis     JACK (obstructive sleep apnea)     Seizures     Splenomegaly      Past Surgical History:   Procedure Laterality Date    APPENDECTOMY      Open    BACK SURGERY      CHOLECYSTECTOMY      laprascopic    LUMBAR DISCECTOMY      SPLENIC ARTERY EMBOLIZATION  04/08/2016    Dr Taveras    TONSILLECTOMY       Family History   Problem Relation  Age of Onset    Heart attack Neg Hx     Heart disease Neg Hx     Hypertension Neg Hx      Social History   Substance Use Topics    Smoking status: Never Smoker    Smokeless tobacco: Current User     Types: Chew    Alcohol use No     Review of Systems   Respiratory: Positive for shortness of breath.    Gastrointestinal:        Abdominal swelling   Genitourinary:        Scrotal swelling   All other systems reviewed and are negative.      Physical Exam     Initial Vitals [01/20/18 1940]   BP Pulse Resp Temp SpO2   137/61 97 18 97.7 °F (36.5 °C) 100 %      MAP       86.33         Physical Exam  Gen/Constitutional: Interactive. No acute distress  Head: Normocephalic, Atraumatic  Neck: supple, no masses or LAD  Eyes: PERRLA, conjunctiva clear  Ears, Nose and Throat: No rhinorrhea or stridor.  Cardiac: Reg Rhythm, No murmur  Pulmonary: CTA Bilat, no wheezes, rhonchi, rales.  GI: Abdomen soft, non-tender, distended with + fluid wave, no rebound or guarding  : No CVA tenderness.  Scrotum with edema, no erythema or induration or sub-Q emphysema  Musculoskeletal: Extremities warm, well perfused, hemosiderin staining to BLE's from mid shin down to ankle with some open weaping areas, no surrounding erythema, minimal woody edema, no calor.  Skin: No rashes (see MSK exam)  Neuro: Alert and Oriented x 3; No focal motor or sensory deficits.    Psych: Normal affect    ED Course   Procedures  Labs Reviewed   CBC W/ AUTO DIFFERENTIAL - Abnormal; Notable for the following:        Result Value    RBC 2.29 (*)     Hemoglobin 8.0 (*)     Hematocrit 24.4 (*)      (*)     MCH 34.9 (*)     RDW 15.2 (*)     Platelets 83 (*)     Lymph # 0.8 (*)     Mono% 18.2 (*)     All other components within normal limits   COMPREHENSIVE METABOLIC PANEL - Abnormal; Notable for the following:     Chloride 113 (*)     CO2 17 (*)     Glucose 137 (*)     Creatinine 1.8 (*)     Calcium 8.6 (*)     Total Protein 5.4 (*)     Albumin 2.2 (*)     Total  Bilirubin 2.0 (*)     AST 59 (*)     eGFR if  45.9 (*)     eGFR if non  39.7 (*)     All other components within normal limits   PROTIME-INR - Abnormal; Notable for the following:     Prothrombin Time 14.3 (*)     INR 1.4 (*)     All other components within normal limits   LIPASE - Abnormal; Notable for the following:     Lipase 190 (*)     All other components within normal limits   AMMONIA - Abnormal; Notable for the following:     Ammonia 69 (*)     All other components within normal limits   B-TYPE NATRIURETIC PEPTIDE   APTT   TROPONIN I   MAGNESIUM   TYPE & SCREEN     X-Ray Chest 1 View   Final Result         No acute findings in the chest.      Elevated right hemidiaphragm.            Electronically signed by: DORINDA CHAIDEZ MD   Date:     01/21/18   Time:    00:50           EKG Readings: (Independently Interpreted)   EKG: NSR, no VANESSA's or STD's, non-specific twave pattern, no STEMI                  APC / Resident Notes:   Alon Adamson is a 62 y.o. male with PMH cirrhosis, CAD presents with 1 week of worsening scrotal swelling and ascites.  Likely acutely worsening liver failure.  No abd tenderness making SBP unlikely.  Will U/S abd and obtain labs.    Update 0048:  Labs reviewed.  Will admit to Medicine.       Scribe Attestation:   Scribe #1: I performed the above scribed service and the documentation accurately describes the services I performed. I attest to the accuracy of the note.    Attending Attestation:   Physician Attestation Statement for Resident:  As the supervising MD   Physician Attestation Statement: I have personally seen and examined this patient.   I agree with the above history. -:   As the supervising MD I agree with the above PE.    As the supervising MD I agree with the above treatment, course, plan, and disposition.   -: Presentation consistent with acute liver failure with new ascites and scrotal and BLE edema 2/2 liver failure / anasarca.  [facility]  warranted admission for evaluation by Hepatology and likely LVP for symptomatic relief and likely IV diuresis.  Medicine accepted.    I have reviewed and agree with the residents interpretation of the following: lab data, x-rays and EKG.  I have reviewed the following: old records at this facility.                    ED Course      Clinical Impression:   The primary encounter diagnosis was Acute liver failure without hepatic coma. Diagnoses of Shortness of breath, Other ascites, and Acute renal insufficiency were also pertinent to this visit.    Disposition:   Disposition: Admitted                        Shashank Milian MD  01/21/18 0242

## 2018-01-21 NOTE — SUBJECTIVE & OBJECTIVE
Review of Systems   Constitutional: Positive for fatigue and unexpected weight change. Negative for chills and fever.   HENT: Negative for facial swelling, mouth sores and trouble swallowing.    Eyes: Negative for pain and redness.   Respiratory: Positive for shortness of breath. Negative for cough.    Cardiovascular: Positive for leg swelling. Negative for chest pain.   Gastrointestinal: Negative for abdominal pain and vomiting.   Genitourinary: Positive for scrotal swelling. Negative for dysuria and hematuria.   Musculoskeletal: Negative for gait problem and neck stiffness.   Skin: Positive for rash. Negative for wound.   Neurological: Negative for seizures and headaches.   Psychiatric/Behavioral: Negative for confusion and hallucinations.       Past Medical History:   Diagnosis Date    Anticoagulant long-term use     Arthritis     Back pain     Cellulitis     Cirrhosis     Coronary artery disease     DM (diabetes mellitus)     Hearing loss     right ear    Hepatic encephalopathy     Hypertension     diagnosed today (11/25/2015) and prescribed toprol    Leg edema     Nephrolithiasis     JACK (obstructive sleep apnea)     Seizures     Splenomegaly        Past Surgical History:   Procedure Laterality Date    APPENDECTOMY      Open    BACK SURGERY      CHOLECYSTECTOMY      laprascopic    LUMBAR DISCECTOMY      SPLENIC ARTERY EMBOLIZATION  04/08/2016    Dr Taveras    TONSILLECTOMY         Family history of liver disease: No    Review of patient's allergies indicates:   Allergen Reactions    Nsaids (non-steroidal anti-inflammatory drug)      D/t to liver disease.    Tylenol [acetaminophen]      D/t liver disease.    Penicillins Other (See Comments)     Since childhood was told not to take it.       Social History Main Topics    Smoking status: Never Smoker    Smokeless tobacco: Current User     Types: Chew    Alcohol use No    Drug use: No    Sexual activity: Not on file       Prescriptions  Prior to Admission   Medication Sig Dispense Refill Last Dose    ascorbic acid (VITAMIN C) 1000 MG tablet Take 1,000 mg by mouth once daily.   1/20/2018    aspirin (ECOTRIN) 81 MG EC tablet Take 81 mg by mouth once daily.   1/20/2018    atorvastatin (LIPITOR) 40 MG tablet TAKE 1 TABLET(40 MG) BY MOUTH EVERY DAY 30 tablet 0 1/20/2018 at Unknown time    docusate sodium (COLACE) 100 MG capsule Take 240 mg by mouth once daily.   1/20/2018    ferrous sulfate 325 mg (65 mg iron) Tab tablet Take 325 mg by mouth daily with breakfast.   1/20/2018    fish oil-omega-3 fatty acids 300-1,000 mg capsule Take 2 capsules (2 g total) by mouth once daily.   1/20/2018    furosemide (LASIX) 40 MG tablet Take 1 tablet (40 mg total) by mouth 2 (two) times daily. 180 tablet 1 1/20/2018    GENERLAC 10 gram/15 mL solution Take 60 mLs (40 g total) by mouth 3 (three) times daily. 60mg three times per day 5400 mL 6 1/20/2018    glyBURIDE-metformin 2.5-500 mg (GLUCOVANCE) 2.5-500 mg per tablet Take 2 tablets by mouth once daily. & 1 tab po PM  10 1/20/2018    levETIRAcetam (KEPPRA) 500 MG Tab Take 1 tablet (500 mg total) by mouth 2 (two) times daily. 180 tablet 1 1/20/2018    magnesium oxide (MAG-OX) 400 mg tablet Take 1 tablet (400 mg total) by mouth once daily.  0 1/20/2018    melatonin 3 mg Tab Take 3 mg by mouth 2 (two) times daily as needed.   1/19/2018    metoprolol tartrate (LOPRESSOR) 25 MG tablet Take 0.5 tablets (12.5 mg total) by mouth 2 (two) times daily. 30 tablet 11 1/20/2018    ondansetron (ZOFRAN) 4 MG tablet Take 1 tablet (4 mg total) by mouth every 8 (eight) hours as needed for Nausea. 15 tablet 3 1/18/2018    rifAXIMin (XIFAXAN) 550 mg Tab Take 1 tablet (550 mg total) by mouth 2 (two) times daily. 60 tablet 5 1/20/2018    spironolactone (ALDACTONE) 100 MG tablet Take 1.5 tablets (150 mg total) by mouth 2 (two) times daily. 270 tablet 1 1/20/2018    tramadol (ULTRAM) 50 mg tablet Take 50 mg by mouth every 6  (six) hours as needed for Pain.   Taking       Objective:     Vital Signs (Most Recent):  Temp: 98.2 °F (36.8 °C) (01/21/18 1138)  Pulse: 72 (01/21/18 1138)  Resp: 18 (01/21/18 1138)  BP: (!) 110/52 (01/21/18 1138)  SpO2: 98 % (01/21/18 1138) Vital Signs (24h Range):  Temp:  [97.7 °F (36.5 °C)-98.2 °F (36.8 °C)] 98.2 °F (36.8 °C)  Pulse:  [72-97] 72  Resp:  [18-20] 18  SpO2:  [98 %-100 %] 98 %  BP: (110-151)/(52-90) 110/52     Weight: (!) 141.1 kg (311 lb 1.1 oz) (01/21/18 0300)  Body mass index is 39.94 kg/m².    Physical Exam   Constitutional: He is oriented to person, place, and time. No distress.   Nontoxic , slightly slow to respond   HENT:   Head: Normocephalic and atraumatic.   Eyes: Conjunctivae are normal. No scleral icterus.   Neck: Neck supple. No thyromegaly present.   Cardiovascular: Normal rate, regular rhythm and intact distal pulses.    Pulmonary/Chest: Effort normal and breath sounds normal. No respiratory distress.   Abdominal: Soft. There is no tenderness. There is no guarding.   Genitourinary:   Genitourinary Comments: Scrotal edema and slightly TTP without overlying redness or rash   Musculoskeletal:   2+ pitting edema with bilateral pretibial erythema and some oozing and weeping in certain spots   Neurological: He is alert and oriented to person, place, and time. No cranial nerve deficit.   Skin: Skin is warm. Rash noted.   Erythema and weeping over pretibial areas bilaterally   Psychiatric: He has a normal mood and affect. His behavior is normal.   Vitals reviewed.      MELD-Na score: 18 at 1/20/2018 11:30 PM  MELD score: 18 at 1/20/2018 11:30 PM  Calculated from:  Serum Creatinine: 1.8 mg/dL at 1/20/2018 11:30 PM  Serum Sodium: 138 mmol/L (Rounded to 137) at 1/20/2018 11:30 PM  Total Bilirubin: 2.0 mg/dL at 1/20/2018 11:30 PM  INR(ratio): 1.4 at 1/20/2018 11:30 PM  Age: 62 years    Significant Labs:  CBC:   Recent Labs  Lab 01/20/18  2330   WBC 4.56   RBC 2.29*   HGB 8.0*   HCT 24.4*   PLT  83*     CMP:   Recent Labs  Lab 01/20/18  2330   *   CALCIUM 8.6*   ALBUMIN 2.2*   PROT 5.4*      K 4.2   CO2 17*   *   BUN 16   CREATININE 1.8*   ALKPHOS 100   ALT 34   AST 59*   BILITOT 2.0*     Coagulation:   Recent Labs  Lab 01/20/18  2330   INR 1.4*   APTT 25.7       Significant Imaging:  Labs: Reviewed

## 2018-01-21 NOTE — CONSULTS
[facility]  Hepatology  Consult Note    Patient Name: Alon Adamson  MRN: [id number]  Admission Date: 1/20/2018  Hospital Length of Stay: 0 days  Attending Provider: Darin Mccarthy MD   Primary Care Physician: Mckinley Vides MD  Principal Problem:Decompensated hepatic cirrhosis    Inpatient consult to Hepatology  Consult performed by: ELISA ARCINIEGA  Consult ordered by: WALESKA WORRELL        Subjective:     Transplant status: Pre-transplant    HPI:  This is a 63 y/o male with hx of etoh cirrhosis complicated by LE edema, hepatic encephalopathy (followed by Dr. García, deferred for transplant eval currently due to concern for CAD pending dobutamine stress test), splenic artery aneurysm s/p coiling who presents with scrotal and abdominal swelling x 1 week. Assoc with SOB and AYALA. He has noted some dark stools intermittently. Has had worsening redness and weeping over his bilatearl shins.   Initially presented to [facility] and was directed to [facility] [place] care.   Admit to taking his lasix 40mg BID and aldactone 150mg bid as well    Review of Systems   Constitutional: Positive for fatigue and unexpected weight change. Negative for chills and fever.   HENT: Negative for facial swelling, mouth sores and trouble swallowing.    Eyes: Negative for pain and redness.   Respiratory: Positive for shortness of breath. Negative for cough.    Cardiovascular: Positive for leg swelling. Negative for chest pain.   Gastrointestinal: Negative for abdominal pain and vomiting.   Genitourinary: Positive for scrotal swelling. Negative for dysuria and hematuria.   Musculoskeletal: Negative for gait problem and neck stiffness.   Skin: Positive for rash. Negative for wound.   Neurological: Negative for seizures and headaches.   Psychiatric/Behavioral: Negative for confusion and hallucinations.       Past Medical History:   Diagnosis Date    Anticoagulant long-term use     Arthritis     Back pain      Cellulitis     Cirrhosis     Coronary artery disease     DM (diabetes mellitus)     Hearing loss     right ear    Hepatic encephalopathy     Hypertension     diagnosed today (11/25/2015) and prescribed toprol    Leg edema     Nephrolithiasis     JACK (obstructive sleep apnea)     Seizures     Splenomegaly        Past Surgical History:   Procedure Laterality Date    APPENDECTOMY      Open    BACK SURGERY      CHOLECYSTECTOMY      laprascopic    LUMBAR DISCECTOMY      SPLENIC ARTERY EMBOLIZATION  04/08/2016    Dr Taveras    TONSILLECTOMY         Family history of liver disease: No    Review of patient's allergies indicates:   Allergen Reactions    Nsaids (non-steroidal anti-inflammatory drug)      D/t to liver disease.    Tylenol [acetaminophen]      D/t liver disease.    Penicillins Other (See Comments)     Since childhood was told not to take it.       Social History Main Topics    Smoking status: Never Smoker    Smokeless tobacco: Current User     Types: Chew    Alcohol use No    Drug use: No    Sexual activity: Not on file       Prescriptions Prior to Admission   Medication Sig Dispense Refill Last Dose    ascorbic acid (VITAMIN C) 1000 MG tablet Take 1,000 mg by mouth once daily.   1/20/2018    aspirin (ECOTRIN) 81 MG EC tablet Take 81 mg by mouth once daily.   1/20/2018    atorvastatin (LIPITOR) 40 MG tablet TAKE 1 TABLET(40 MG) BY MOUTH EVERY DAY 30 tablet 0 1/20/2018 at Unknown time    docusate sodium (COLACE) 100 MG capsule Take 240 mg by mouth once daily.   1/20/2018    ferrous sulfate 325 mg (65 mg iron) Tab tablet Take 325 mg by mouth daily with breakfast.   1/20/2018    fish oil-omega-3 fatty acids 300-1,000 mg capsule Take 2 capsules (2 g total) by mouth once daily.   1/20/2018    furosemide (LASIX) 40 MG tablet Take 1 tablet (40 mg total) by mouth 2 (two) times daily. 180 tablet 1 1/20/2018    GENERLAC 10 gram/15 mL solution Take 60 mLs (40 g total) by mouth 3 (three)  times daily. 60mg three times per day 5400 mL 6 1/20/2018    glyBURIDE-metformin 2.5-500 mg (GLUCOVANCE) 2.5-500 mg per tablet Take 2 tablets by mouth once daily. & 1 tab po PM  10 1/20/2018    levETIRAcetam (KEPPRA) 500 MG Tab Take 1 tablet (500 mg total) by mouth 2 (two) times daily. 180 tablet 1 1/20/2018    magnesium oxide (MAG-OX) 400 mg tablet Take 1 tablet (400 mg total) by mouth once daily.  0 1/20/2018    melatonin 3 mg Tab Take 3 mg by mouth 2 (two) times daily as needed.   1/19/2018    metoprolol tartrate (LOPRESSOR) 25 MG tablet Take 0.5 tablets (12.5 mg total) by mouth 2 (two) times daily. 30 tablet 11 1/20/2018    ondansetron (ZOFRAN) 4 MG tablet Take 1 tablet (4 mg total) by mouth every 8 (eight) hours as needed for Nausea. 15 tablet 3 1/18/2018    rifAXIMin (XIFAXAN) 550 mg Tab Take 1 tablet (550 mg total) by mouth 2 (two) times daily. 60 tablet 5 1/20/2018    spironolactone (ALDACTONE) 100 MG tablet Take 1.5 tablets (150 mg total) by mouth 2 (two) times daily. 270 tablet 1 1/20/2018    tramadol (ULTRAM) 50 mg tablet Take 50 mg by mouth every 6 (six) hours as needed for Pain.   Taking       Objective:     Vital Signs (Most Recent):  Temp: 98.2 °F (36.8 °C) (01/21/18 1138)  Pulse: 72 (01/21/18 1138)  Resp: 18 (01/21/18 1138)  BP: (!) 110/52 (01/21/18 1138)  SpO2: 98 % (01/21/18 1138) Vital Signs (24h Range):  Temp:  [97.7 °F (36.5 °C)-98.2 °F (36.8 °C)] 98.2 °F (36.8 °C)  Pulse:  [72-97] 72  Resp:  [18-20] 18  SpO2:  [98 %-100 %] 98 %  BP: (110-151)/(52-90) 110/52     Weight: (!) 141.1 kg (311 lb 1.1 oz) (01/21/18 0300)  Body mass index is 39.94 kg/m².    Physical Exam   Constitutional: He is oriented to person, place, and time. No distress.   Nontoxic , slightly slow to respond   HENT:   Head: Normocephalic and atraumatic.   Eyes: Conjunctivae are normal. No scleral icterus.   Neck: Neck supple. No thyromegaly present.   Cardiovascular: Normal rate, regular rhythm and intact distal pulses.     Pulmonary/Chest: Effort normal and breath sounds normal. No respiratory distress.   Abdominal: Soft. There is no tenderness. There is no guarding.   Genitourinary:   Genitourinary Comments: Scrotal edema and slightly TTP without overlying redness or rash   Musculoskeletal:   2+ pitting edema with bilateral pretibial erythema and some oozing and weeping in certain spots   Neurological: He is alert and oriented to person, place, and time. No cranial nerve deficit.   Skin: Skin is warm. Rash noted.   Erythema and weeping over pretibial areas bilaterally   Psychiatric: He has a normal mood and affect. His behavior is normal.   Vitals reviewed.      MELD-Na score: 18 at 1/20/2018 11:30 PM  MELD score: 18 at 1/20/2018 11:30 PM  Calculated from:  Serum Creatinine: 1.8 mg/dL at 1/20/2018 11:30 PM  Serum Sodium: 138 mmol/L (Rounded to 137) at 1/20/2018 11:30 PM  Total Bilirubin: 2.0 mg/dL at 1/20/2018 11:30 PM  INR(ratio): 1.4 at 1/20/2018 11:30 PM  Age: 62 years    Significant Labs:  CBC:   Recent Labs  Lab 01/20/18  2330   WBC 4.56   RBC 2.29*   HGB 8.0*   HCT 24.4*   PLT 83*     CMP:   Recent Labs  Lab 01/20/18  2330   *   CALCIUM 8.6*   ALBUMIN 2.2*   PROT 5.4*      K 4.2   CO2 17*   *   BUN 16   CREATININE 1.8*   ALKPHOS 100   ALT 34   AST 59*   BILITOT 2.0*     Coagulation:   Recent Labs  Lab 01/20/18  2330   INR 1.4*   APTT 25.7       Significant Imaging:  Labs: Reviewed    Assessment/Plan:     Cirrhosis of liver without ascites    Hx of Etoh cirrhosis undergoing transplant eval. Needs dobutamine stress to complete eval.  Currently admitted with SOB , LE edema, suspected cellulitis pretibially.  Some concern on labs including anemia from baseline and VICKY    Recs:  Urine studies including Heather, UCr, Uurea, UA  Hold diuretics  Give albumin  TID today  Check PETH test  Anemia workup with B12, folate, iron studies, hemolysis labs  Watch pace /character of stooling  Lactulose and rifaximin  Wound care   C/s  IV Abx with vancomycin for cellulitis pretibially  Social work consult for transplant eval - I e what has been barrier to patient getting his final testing done    Consider dobutamine stress testing while here, pending social work eval.        VICKY (acute kidney injury)    As above        Anemia of chronic disease    As above            Thank you for your consult. I will follow-up with patient. Please contact us if you have any additional questions.    Manny Garcia MD  Hepatology  [facility]

## 2018-01-21 NOTE — H&P
"[facility] Hospital Medicine  History & Physical    Patient Name: Alon Adamson  MRN: [id number]  Admission Date: 1/20/2018  Attending Physician: Darin Mccarthy MD   Primary Care Provider: Mckinley Vides MD    [facility] [place] Medicine Team: [facility] HOSP MED  Jesus Edmondson MD     Patient information was obtained from patient, past medical records and ER records.     Subjective:     Principal Problem:Decompensated hepatic cirrhosis    Chief Complaint:   Chief Complaint   Patient presents with    Ascites     Liver patient. Scrotal and abdominal swelling        HPI: 62M with cirrhosis presents with recent worsening of abdominal distension and lower extremity edema over the past 1-2 weeks.  Previously his diuretic regimen of lasix 40mg bid and spironolactone 150mg bid was effective in keeping excess fluid off, and he has never been this edematous before.  Other than this he denies any other complaints.  Upon more targeted questioning he admits that about 2 weeks ago his venous stasis ulcers on his shins were more warm and erythematous, and he had some drainage from wounds on his right shin.  He thinks he may have had some fevers then, but those have since resolved and the redness is improved.  His son has been bandaging the wounds and applying silvadene to them.  He does admit to being a bit more fatigued lately.  Appetite has been non-existent for "years" but he makes himself eat regularly.  He denies any hypoglycemic episodes.  He has nearly completed transplant workup which was deferred in 12/2016 for a positive nuclear stress test, for which he underwent PCI and stenting in 2/2017.  Re-presentation to selection committee is pending follow-up repeat stress test which has yet to be completed due to scheduling conflicts on account of family issues.  He has not experienced any cardiovascular symptoms.      Past Medical History:   Diagnosis Date    Anticoagulant long-term use     Arthritis     " Back pain     Cellulitis     Cirrhosis     Coronary artery disease     DM (diabetes mellitus)     Hearing loss     right ear    Hepatic encephalopathy     Hypertension     diagnosed today (11/25/2015) and prescribed toprol    Leg edema     Nephrolithiasis     JACK (obstructive sleep apnea)     Seizures     Splenomegaly        Past Surgical History:   Procedure Laterality Date    APPENDECTOMY      Open    BACK SURGERY      CHOLECYSTECTOMY      laprascopic    LUMBAR DISCECTOMY      SPLENIC ARTERY EMBOLIZATION  04/08/2016    Dr Taveras    TONSILLECTOMY         Review of patient's allergies indicates:   Allergen Reactions    Nsaids (non-steroidal anti-inflammatory drug)      D/t to liver disease.    Tylenol [acetaminophen]      D/t liver disease.    Penicillins Other (See Comments)     Since childhood was told not to take it.       No current facility-administered medications on file prior to encounter.      Current Outpatient Prescriptions on File Prior to Encounter   Medication Sig    ascorbic acid (VITAMIN C) 1000 MG tablet Take 1,000 mg by mouth once daily.    aspirin (ECOTRIN) 81 MG EC tablet Take 81 mg by mouth once daily.    atorvastatin (LIPITOR) 40 MG tablet TAKE 1 TABLET(40 MG) BY MOUTH EVERY DAY    docusate sodium (COLACE) 100 MG capsule Take 240 mg by mouth once daily.    ferrous sulfate 325 mg (65 mg iron) Tab tablet Take 325 mg by mouth daily with breakfast.    fish oil-omega-3 fatty acids 300-1,000 mg capsule Take 2 capsules (2 g total) by mouth once daily.    furosemide (LASIX) 40 MG tablet Take 1 tablet (40 mg total) by mouth 2 (two) times daily.    GENERLAC 10 gram/15 mL solution Take 60 mLs (40 g total) by mouth 3 (three) times daily. 60mg three times per day    glyBURIDE-metformin 2.5-500 mg (GLUCOVANCE) 2.5-500 mg per tablet Take 2 tablets by mouth once daily. & 1 tab po PM    levETIRAcetam (KEPPRA) 500 MG Tab Take 1 tablet (500 mg total) by mouth 2 (two) times daily.     magnesium oxide (MAG-OX) 400 mg tablet Take 1 tablet (400 mg total) by mouth once daily.    melatonin 3 mg Tab Take 3 mg by mouth 2 (two) times daily as needed.    metoprolol tartrate (LOPRESSOR) 25 MG tablet Take 0.5 tablets (12.5 mg total) by mouth 2 (two) times daily.    ondansetron (ZOFRAN) 4 MG tablet Take 1 tablet (4 mg total) by mouth every 8 (eight) hours as needed for Nausea.    rifAXIMin (XIFAXAN) 550 mg Tab Take 1 tablet (550 mg total) by mouth 2 (two) times daily.    spironolactone (ALDACTONE) 100 MG tablet Take 1.5 tablets (150 mg total) by mouth 2 (two) times daily.    tramadol (ULTRAM) 50 mg tablet Take 50 mg by mouth every 6 (six) hours as needed for Pain.     Family History     None        Social History Main Topics    Smoking status: Never Smoker    Smokeless tobacco: Current User     Types: Chew    Alcohol use No    Drug use: No    Sexual activity: Not on file     Review of Systems   Constitutional: Positive for fatigue and fever. Negative for appetite change.   HENT: Negative for mouth sores and nosebleeds.    Eyes: Negative for photophobia and visual disturbance.   Respiratory: Negative for cough and shortness of breath.    Cardiovascular: Positive for leg swelling. Negative for chest pain.   Gastrointestinal: Positive for abdominal distention and abdominal pain. Negative for nausea and vomiting.   Endocrine: Negative for polydipsia and polyuria.   Genitourinary: Positive for scrotal swelling. Negative for dysuria and hematuria.   Musculoskeletal: Negative for arthralgias and myalgias.   Skin: Positive for color change and wound.   Neurological: Negative for dizziness, syncope, light-headedness and headaches.   Hematological: Bruises/bleeds easily.     Objective:     Vital Signs (Most Recent):  Temp: 98.1 °F (36.7 °C) (01/21/18 0408)  Pulse: 78 (01/21/18 0700)  Resp: 20 (01/21/18 0408)  BP: 133/63 (01/21/18 0408)  SpO2: 100 % (01/21/18 0408) Vital Signs (24h Range):  Temp:  [97.7  °F (36.5 °C)-98.1 °F (36.7 °C)] 98.1 °F (36.7 °C)  Pulse:  [74-97] 78  Resp:  [18-20] 20  SpO2:  [99 %-100 %] 100 %  BP: (121-151)/(58-65) 133/63     Weight: (!) 141.1 kg (311 lb 1.1 oz)  Body mass index is 39.94 kg/m².    Physical Exam   Constitutional: He is oriented to person, place, and time. He appears well-developed. He is sleeping. He is easily aroused. He appears ill.   Obese   HENT:   Head: Normocephalic and atraumatic.   Mouth/Throat: Oropharynx is clear and moist.   Eyes: Conjunctivae and EOM are normal. Pupils are equal, round, and reactive to light. No scleral icterus.   Neck: Normal range of motion. Neck supple. No JVD present.   Cardiovascular: Normal rate and intact distal pulses.  Exam reveals no gallop and no friction rub.    No murmur heard.  Pulmonary/Chest: Effort normal and breath sounds normal.   Abdominal: Soft. Bowel sounds are normal. He exhibits distension. There is no tenderness. There is no rebound and no guarding.   Musculoskeletal: He exhibits edema (moderate BLE edema). He exhibits no tenderness.   Lymphadenopathy:     He has no cervical adenopathy.   Neurological: He is alert, oriented to person, place, and time and easily aroused. No cranial nerve deficit.   Mild asterixis present   Skin: Skin is warm and dry.   Venous stasis dermatitis present on BLE with chronic ulcers.  Both anterior shins are deeply erythematous, R>L.  Erythematous area of RLE notably warmer than on LLE.  There is additionally honey-crusting present over the affected area on RLE resembling impetigo   Nursing note and vitals reviewed.        CRANIAL NERVES     CN III, IV, VI   Pupils are equal, round, and reactive to light.  Extraocular motions are normal.        Significant Labs:   CBC:   Recent Labs  Lab 01/20/18  2330   WBC 4.56   HGB 8.0*   HCT 24.4*   PLT 83*     CMP:   Recent Labs  Lab 01/20/18  2330      K 4.2   *   CO2 17*   *   BUN 16   CREATININE 1.8*   CALCIUM 8.6*   PROT 5.4*    ALBUMIN 2.2*   BILITOT 2.0*   ALKPHOS 100   AST 59*   ALT 34   ANIONGAP 8   EGFRNONAA 39.7*     Coagulation:   Recent Labs  Lab 01/20/18  2330   INR 1.4*   APTT 25.7       Significant Imaging: n/a    Assessment/Plan:     * Decompensated hepatic cirrhosis    Suspect acute decompensation likely due to cellulitis/impetigo of lower extremities.  Will evaluate further with procalcitonin.  Given presence of wounds and drainage coverage for MRSA would be prudent.  Given present GFR will treat with Vancomycin 15mg/kg IV q 18h.  Hepatology consult.  Trend CMP and INR for daily MELD.    MELD-Na score: 18 at 1/20/2018 11:30 PM  MELD score: 18 at 1/20/2018 11:30 PM  Calculated from:  Serum Creatinine: 1.8 mg/dL at 1/20/2018 11:30 PM  Serum Sodium: 138 mmol/L (Rounded to 137) at 1/20/2018 11:30 PM  Total Bilirubin: 2.0 mg/dL at 1/20/2018 11:30 PM  INR(ratio): 1.4 at 1/20/2018 11:30 PM  Age: 62 years            VICKY (acute kidney injury)    Unclear whether due to hepatic decompensation, recent/ongoing bacterial infection, medication side effect, or other etiology.  Will monitor creatinine while attempting to diurese the excess edema.  Can consider trying albumin infusion to increase oncotic pressure if creatinine does not improve.            Impetigo    Likely extant bacterial infection in BLE, R>L.  Tx with Vancomycin and local wound care as mentioned above.          Coronary artery disease involving native coronary artery of native heart without angina pectoris    S/p PCI 2/2017.  Took dual antiplatelet therapy x 1 month afterwards, but just on aspirin, statin, and beta-blocker now.  Needs repeat pharmacologic stress echo to be re-presented to selection committee.            Bilateral edema of lower extremity    Secondary to hepatic decompensation and hypoalbuminemia on top of chronic venous stasis.  Will attempt mild diuresis with IV lasix 40mg bid and continuing po spironolactone.  Start protein supplementation to try to  increase albumin.  Wound Care consult for management of venous stasis ulcers.          Hepatic encephalopathy    On rifaximin and lactulose for chronic prophylaxis.  He didn't get it for most of yesterday and shows mild asterixis on exam; resume home doses for now unless symptoms worsen.            VTE Risk Mitigation         Ordered     Reason for No Pharmacological VTE Prophylaxis  Once      01/21/18 0418     Reason for no Mechanical VTE Prophylaxis  Once      01/21/18 0418     Medium Risk of VTE  Once      01/21/18 0418             Jesus Edmondson MD  Department of Hospital Medicine   [facility]

## 2018-01-21 NOTE — ASSESSMENT & PLAN NOTE
Suspect acute decompensation likely due to cellulitis/impetigo of lower extremities.  Will evaluate further with procalcitonin.  Given presence of wounds and drainage coverage for MRSA would be prudent.  Given present GFR will treat with Vancomycin 15mg/kg IV q 18h.  Hepatology consult.  Trend CMP and INR for daily MELD.    MELD-Na score: 18 at 1/20/2018 11:30 PM  MELD score: 18 at 1/20/2018 11:30 PM  Calculated from:  Serum Creatinine: 1.8 mg/dL at 1/20/2018 11:30 PM  Serum Sodium: 138 mmol/L (Rounded to 137) at 1/20/2018 11:30 PM  Total Bilirubin: 2.0 mg/dL at 1/20/2018 11:30 PM  INR(ratio): 1.4 at 1/20/2018 11:30 PM  Age: 62 years

## 2018-01-21 NOTE — ASSESSMENT & PLAN NOTE
Likely extant bacterial infection in BLE, R>L.  Tx with Vancomycin and local wound care as mentioned above.

## 2018-01-21 NOTE — NURSING
Pt arrived to unit floor from ED.  Oriented pt to room. Educated pt on use of call bell.  Pt verbalize understanding to call when needed assistance. Notified MD of pt arrival to unit floor. VSS Admission documents completed. Will continue to monitor.

## 2018-01-22 ENCOUNTER — SURGERY (OUTPATIENT)
Age: 63
End: 2018-01-22

## 2018-01-22 ENCOUNTER — ANESTHESIA EVENT (OUTPATIENT)
Dept: ENDOSCOPY | Facility: HOSPITAL | Age: 63
DRG: 433 | End: 2018-01-22
Payer: COMMERCIAL

## 2018-01-22 ENCOUNTER — ANESTHESIA (OUTPATIENT)
Dept: ENDOSCOPY | Facility: HOSPITAL | Age: 63
DRG: 433 | End: 2018-01-22
Payer: COMMERCIAL

## 2018-01-22 PROBLEM — R23.9 ALTERATION IN SKIN INTEGRITY: Status: ACTIVE | Noted: 2018-01-22

## 2018-01-22 PROBLEM — D62 ACUTE BLOOD LOSS ANEMIA: Status: ACTIVE | Noted: 2018-01-22

## 2018-01-22 PROBLEM — D61.818 PANCYTOPENIA: Status: ACTIVE | Noted: 2018-01-22

## 2018-01-22 PROBLEM — K70.31 ASCITES DUE TO ALCOHOLIC CIRRHOSIS: Status: ACTIVE | Noted: 2018-01-22

## 2018-01-22 LAB
ALBUMIN SERPL BCP-MCNC: 2.3 G/DL
ALP SERPL-CCNC: 64 U/L
ALT SERPL W/O P-5'-P-CCNC: 22 U/L
AMMONIA PLAS-SCNC: 136 UMOL/L
ANION GAP SERPL CALC-SCNC: 7 MMOL/L
ANISOCYTOSIS BLD QL SMEAR: SLIGHT
ANISOCYTOSIS BLD QL SMEAR: SLIGHT
APPEARANCE FLD: CLEAR
AST SERPL-CCNC: 40 U/L
BASOPHILS # BLD AUTO: ABNORMAL K/UL
BASOPHILS # BLD AUTO: ABNORMAL K/UL
BASOPHILS NFR BLD: 0 %
BASOPHILS NFR BLD: 1 %
BILIRUB SERPL-MCNC: 1.6 MG/DL
BLD PROD TYP BPU: NORMAL
BLD PROD TYP BPU: NORMAL
BLOOD UNIT EXPIRATION DATE: NORMAL
BLOOD UNIT EXPIRATION DATE: NORMAL
BLOOD UNIT TYPE CODE: 6200
BLOOD UNIT TYPE CODE: 6200
BLOOD UNIT TYPE: NORMAL
BLOOD UNIT TYPE: NORMAL
BODY FLD TYPE: NORMAL
BUN SERPL-MCNC: 16 MG/DL
CALCIUM SERPL-MCNC: 8 MG/DL
CHLORIDE SERPL-SCNC: 113 MMOL/L
CO2 SERPL-SCNC: 15 MMOL/L
CODING SYSTEM: NORMAL
CODING SYSTEM: NORMAL
COLOR FLD: YELLOW
CREAT SERPL-MCNC: 1.8 MG/DL
DIFFERENTIAL METHOD: ABNORMAL
DIFFERENTIAL METHOD: ABNORMAL
DISPENSE STATUS: NORMAL
DISPENSE STATUS: NORMAL
EOSINOPHIL # BLD AUTO: ABNORMAL K/UL
EOSINOPHIL # BLD AUTO: ABNORMAL K/UL
EOSINOPHIL NFR BLD: 0 %
EOSINOPHIL NFR BLD: 2 %
ERYTHROCYTE [DISTWIDTH] IN BLOOD BY AUTOMATED COUNT: 14.5 %
ERYTHROCYTE [DISTWIDTH] IN BLOOD BY AUTOMATED COUNT: 14.6 %
EST. GFR  (AFRICAN AMERICAN): 45.9 ML/MIN/1.73 M^2
EST. GFR  (NON AFRICAN AMERICAN): 39.7 ML/MIN/1.73 M^2
GLUCOSE SERPL-MCNC: 157 MG/DL
HCT VFR BLD AUTO: 16.1 %
HCT VFR BLD AUTO: 16.4 %
HGB BLD-MCNC: 5.5 G/DL
HGB BLD-MCNC: 5.6 G/DL
HYPOCHROMIA BLD QL SMEAR: ABNORMAL
IMM GRANULOCYTES # BLD AUTO: ABNORMAL K/UL
IMM GRANULOCYTES # BLD AUTO: ABNORMAL K/UL
IMM GRANULOCYTES NFR BLD AUTO: ABNORMAL %
IMM GRANULOCYTES NFR BLD AUTO: ABNORMAL %
INR PPP: 1.7
LYMPHOCYTES # BLD AUTO: ABNORMAL K/UL
LYMPHOCYTES # BLD AUTO: ABNORMAL K/UL
LYMPHOCYTES NFR BLD: 19 %
LYMPHOCYTES NFR BLD: 25.5 %
LYMPHOCYTES NFR FLD MANUAL: 53 %
MAGNESIUM SERPL-MCNC: 2 MG/DL
MCH RBC QN AUTO: 34.8 PG
MCH RBC QN AUTO: 35 PG
MCHC RBC AUTO-ENTMCNC: 34.1 G/DL
MCHC RBC AUTO-ENTMCNC: 34.2 G/DL
MCV RBC AUTO: 102 FL
MCV RBC AUTO: 103 FL
METAMYELOCYTES NFR BLD MANUAL: 1 %
MONOCYTES # BLD AUTO: ABNORMAL K/UL
MONOCYTES # BLD AUTO: ABNORMAL K/UL
MONOCYTES NFR BLD: 14 %
MONOCYTES NFR BLD: 8.5 %
MONOS+MACROS NFR FLD MANUAL: 29 %
NEUTROPHILS NFR BLD: 63 %
NEUTROPHILS NFR BLD: 66 %
NEUTROPHILS NFR FLD MANUAL: 18 %
NRBC BLD-RTO: 0 /100 WBC
NRBC BLD-RTO: 0 /100 WBC
OVALOCYTES BLD QL SMEAR: ABNORMAL
PLATELET # BLD AUTO: 33 K/UL
PLATELET # BLD AUTO: 33 K/UL
PLATELET BLD QL SMEAR: ABNORMAL
PLATELET BLD QL SMEAR: ABNORMAL
PMV BLD AUTO: 11.7 FL
PMV BLD AUTO: 11.8 FL
POCT GLUCOSE: 122 MG/DL (ref 70–110)
POCT GLUCOSE: 125 MG/DL (ref 70–110)
POCT GLUCOSE: 127 MG/DL (ref 70–110)
POCT GLUCOSE: 164 MG/DL (ref 70–110)
POCT GLUCOSE: 168 MG/DL (ref 70–110)
POCT GLUCOSE: 168 MG/DL (ref 70–110)
POCT GLUCOSE: 178 MG/DL (ref 70–110)
POIKILOCYTOSIS BLD QL SMEAR: SLIGHT
POLYCHROMASIA BLD QL SMEAR: ABNORMAL
POTASSIUM SERPL-SCNC: 4.4 MMOL/L
PREALB SERPL-MCNC: 5 MG/DL
PROT SERPL-MCNC: 4.4 G/DL
PROTHROMBIN TIME: 16.3 SEC
RBC # BLD AUTO: 1.58 M/UL
RBC # BLD AUTO: 1.6 M/UL
SODIUM SERPL-SCNC: 135 MMOL/L
TRANS ERYTHROCYTES VOL PATIENT: NORMAL ML
TRANS ERYTHROCYTES VOL PATIENT: NORMAL ML
WBC # BLD AUTO: 1.51 K/UL
WBC # BLD AUTO: 1.63 K/UL
WBC # FLD: 113 /CU MM

## 2018-01-22 PROCEDURE — D9220A PRA ANESTHESIA: Mod: ANES,NTX,, | Performed by: ANESTHESIOLOGY

## 2018-01-22 PROCEDURE — 83735 ASSAY OF MAGNESIUM: CPT | Mod: NTX

## 2018-01-22 PROCEDURE — 63600175 PHARM REV CODE 636 W HCPCS: Mod: NTX | Performed by: NURSE PRACTITIONER

## 2018-01-22 PROCEDURE — D9220A PRA ANESTHESIA: Mod: CRNA,NTX,, | Performed by: NURSE ANESTHETIST, CERTIFIED REGISTERED

## 2018-01-22 PROCEDURE — 99232 SBSQ HOSP IP/OBS MODERATE 35: CPT | Mod: NTX,,, | Performed by: INTERNAL MEDICINE

## 2018-01-22 PROCEDURE — 43235 EGD DIAGNOSTIC BRUSH WASH: CPT | Mod: NTX,,, | Performed by: INTERNAL MEDICINE

## 2018-01-22 PROCEDURE — 80053 COMPREHEN METABOLIC PANEL: CPT | Mod: NTX

## 2018-01-22 PROCEDURE — P9021 RED BLOOD CELLS UNIT: HCPCS | Mod: NTX

## 2018-01-22 PROCEDURE — 0W9G3ZZ DRAINAGE OF PERITONEAL CAVITY, PERCUTANEOUS APPROACH: ICD-10-PCS | Performed by: RADIOLOGY

## 2018-01-22 PROCEDURE — 99233 SBSQ HOSP IP/OBS HIGH 50: CPT | Mod: NTX,,, | Performed by: HOSPITALIST

## 2018-01-22 PROCEDURE — 20600001 HC STEP DOWN PRIVATE ROOM: Mod: NTX

## 2018-01-22 PROCEDURE — 85027 COMPLETE CBC AUTOMATED: CPT | Mod: 91,NTX

## 2018-01-22 PROCEDURE — 82140 ASSAY OF AMMONIA: CPT | Mod: NTX

## 2018-01-22 PROCEDURE — 37000009 HC ANESTHESIA EA ADD 15 MINS: Mod: NTX | Performed by: INTERNAL MEDICINE

## 2018-01-22 PROCEDURE — 25000003 PHARM REV CODE 250: Mod: NTX | Performed by: HOSPITALIST

## 2018-01-22 PROCEDURE — 87075 CULTR BACTERIA EXCEPT BLOOD: CPT | Mod: NTX

## 2018-01-22 PROCEDURE — 36415 COLL VENOUS BLD VENIPUNCTURE: CPT | Mod: NTX

## 2018-01-22 PROCEDURE — 82962 GLUCOSE BLOOD TEST: CPT | Mod: NTX | Performed by: INTERNAL MEDICINE

## 2018-01-22 PROCEDURE — 89051 BODY FLUID CELL COUNT: CPT | Mod: NTX

## 2018-01-22 PROCEDURE — 87070 CULTURE OTHR SPECIMN AEROBIC: CPT | Mod: NTX

## 2018-01-22 PROCEDURE — 37000008 HC ANESTHESIA 1ST 15 MINUTES: Mod: NTX | Performed by: INTERNAL MEDICINE

## 2018-01-22 PROCEDURE — 63600175 PHARM REV CODE 636 W HCPCS: Mod: NTX | Performed by: HOSPITALIST

## 2018-01-22 PROCEDURE — 85007 BL SMEAR W/DIFF WBC COUNT: CPT | Mod: 91,NTX

## 2018-01-22 PROCEDURE — 84134 ASSAY OF PREALBUMIN: CPT | Mod: NTX

## 2018-01-22 PROCEDURE — 43235 EGD DIAGNOSTIC BRUSH WASH: CPT | Mod: NTX | Performed by: INTERNAL MEDICINE

## 2018-01-22 PROCEDURE — C9113 INJ PANTOPRAZOLE SODIUM, VIA: HCPCS | Mod: NTX | Performed by: HOSPITALIST

## 2018-01-22 PROCEDURE — 63600175 PHARM REV CODE 636 W HCPCS: Mod: NTX | Performed by: NURSE ANESTHETIST, CERTIFIED REGISTERED

## 2018-01-22 PROCEDURE — 25000003 PHARM REV CODE 250: Mod: NTX | Performed by: NURSE ANESTHETIST, CERTIFIED REGISTERED

## 2018-01-22 PROCEDURE — 85610 PROTHROMBIN TIME: CPT | Mod: NTX

## 2018-01-22 PROCEDURE — 0DJ08ZZ INSPECTION OF UPPER INTESTINAL TRACT, VIA NATURAL OR ARTIFICIAL OPENING ENDOSCOPIC: ICD-10-PCS | Performed by: INTERNAL MEDICINE

## 2018-01-22 RX ORDER — HYDROCODONE BITARTRATE AND ACETAMINOPHEN 500; 5 MG/1; MG/1
TABLET ORAL
Status: DISCONTINUED | OUTPATIENT
Start: 2018-01-22 | End: 2018-01-24

## 2018-01-22 RX ORDER — OCTREOTIDE ACETATE 100 UG/ML
100 INJECTION, SOLUTION INTRAVENOUS; SUBCUTANEOUS EVERY 8 HOURS
Status: DISCONTINUED | OUTPATIENT
Start: 2018-01-22 | End: 2018-01-23

## 2018-01-22 RX ORDER — SODIUM CHLORIDE 0.9 % (FLUSH) 0.9 %
5 SYRINGE (ML) INJECTION
Status: DISCONTINUED | OUTPATIENT
Start: 2018-01-22 | End: 2018-01-27 | Stop reason: HOSPADM

## 2018-01-22 RX ORDER — PROPOFOL 10 MG/ML
VIAL (ML) INTRAVENOUS CONTINUOUS PRN
Status: DISCONTINUED | OUTPATIENT
Start: 2018-01-22 | End: 2018-01-22

## 2018-01-22 RX ORDER — SODIUM CHLORIDE 0.9 % (FLUSH) 0.9 %
3 SYRINGE (ML) INJECTION
Status: DISCONTINUED | OUTPATIENT
Start: 2018-01-22 | End: 2018-01-22 | Stop reason: HOSPADM

## 2018-01-22 RX ORDER — CIPROFLOXACIN 2 MG/ML
400 INJECTION, SOLUTION INTRAVENOUS
Status: DISCONTINUED | OUTPATIENT
Start: 2018-01-22 | End: 2018-01-23

## 2018-01-22 RX ORDER — SODIUM CHLORIDE 9 MG/ML
INJECTION, SOLUTION INTRAVENOUS CONTINUOUS PRN
Status: DISCONTINUED | OUTPATIENT
Start: 2018-01-22 | End: 2018-01-22

## 2018-01-22 RX ORDER — VANCOMYCIN HCL IN 5 % DEXTROSE 1.5G/250ML
1500 PLASTIC BAG, INJECTION (ML) INTRAVENOUS
Status: DISCONTINUED | OUTPATIENT
Start: 2018-01-22 | End: 2018-01-22

## 2018-01-22 RX ORDER — PROPOFOL 10 MG/ML
VIAL (ML) INTRAVENOUS
Status: DISCONTINUED | OUTPATIENT
Start: 2018-01-22 | End: 2018-01-22

## 2018-01-22 RX ORDER — LACTULOSE 10 G/15ML
40 SOLUTION ORAL EVERY 6 HOURS
Status: DISCONTINUED | OUTPATIENT
Start: 2018-01-22 | End: 2018-01-22

## 2018-01-22 RX ORDER — ONDANSETRON 2 MG/ML
4 INJECTION INTRAMUSCULAR; INTRAVENOUS EVERY 6 HOURS PRN
Status: DISCONTINUED | OUTPATIENT
Start: 2018-01-22 | End: 2018-01-27 | Stop reason: HOSPADM

## 2018-01-22 RX ORDER — PANTOPRAZOLE SODIUM 40 MG/1
40 TABLET, DELAYED RELEASE ORAL
Status: DISCONTINUED | OUTPATIENT
Start: 2018-01-22 | End: 2018-01-27 | Stop reason: HOSPADM

## 2018-01-22 RX ORDER — LACTULOSE 10 G/15ML
60 SOLUTION ORAL EVERY 6 HOURS
Status: DISCONTINUED | OUTPATIENT
Start: 2018-01-22 | End: 2018-01-24

## 2018-01-22 RX ORDER — KETAMINE HCL IN 0.9 % NACL 50 MG/5 ML
SYRINGE (ML) INTRAVENOUS
Status: DISCONTINUED | OUTPATIENT
Start: 2018-01-22 | End: 2018-01-22

## 2018-01-22 RX ORDER — LIDOCAINE HYDROCHLORIDE 40 MG/ML
SOLUTION TOPICAL
Status: DISCONTINUED | OUTPATIENT
Start: 2018-01-22 | End: 2018-01-22

## 2018-01-22 RX ADMIN — LACTULOSE 60 G: 20 SOLUTION ORAL at 09:01

## 2018-01-22 RX ADMIN — PANTOPRAZOLE SODIUM 40 MG: 40 INJECTION, POWDER, FOR SOLUTION INTRAVENOUS at 08:01

## 2018-01-22 RX ADMIN — LEVETIRACETAM 500 MG: 500 TABLET ORAL at 08:01

## 2018-01-22 RX ADMIN — PROPOFOL 75 MCG/KG/MIN: 10 INJECTION, EMULSION INTRAVENOUS at 03:01

## 2018-01-22 RX ADMIN — LACTULOSE 40 G: 20 SOLUTION ORAL at 05:01

## 2018-01-22 RX ADMIN — RIFAXIMIN 550 MG: 550 TABLET ORAL at 09:01

## 2018-01-22 RX ADMIN — PROPOFOL 60 MG: 10 INJECTION, EMULSION INTRAVENOUS at 02:01

## 2018-01-22 RX ADMIN — ONDANSETRON 4 MG: 2 INJECTION INTRAMUSCULAR; INTRAVENOUS at 09:01

## 2018-01-22 RX ADMIN — OCTREOTIDE ACETATE 100 MCG: 100 INJECTION, SOLUTION INTRAVENOUS; SUBCUTANEOUS at 05:01

## 2018-01-22 RX ADMIN — RIFAXIMIN 550 MG: 550 TABLET ORAL at 08:01

## 2018-01-22 RX ADMIN — SODIUM CHLORIDE: 0.9 INJECTION, SOLUTION INTRAVENOUS at 02:01

## 2018-01-22 RX ADMIN — OCTREOTIDE ACETATE 50 MCG/HR: 1000 INJECTION, SOLUTION INTRAVENOUS; SUBCUTANEOUS at 10:01

## 2018-01-22 RX ADMIN — FERROUS SULFATE TAB EC 325 MG (65 MG FE EQUIVALENT) 325 MG: 325 (65 FE) TABLET DELAYED RESPONSE at 08:01

## 2018-01-22 RX ADMIN — Medication 30 MG: at 02:01

## 2018-01-22 RX ADMIN — ASPIRIN 81 MG: 81 TABLET, COATED ORAL at 08:01

## 2018-01-22 RX ADMIN — CIPROFLOXACIN 400 MG: 2 INJECTION, SOLUTION INTRAVENOUS at 09:01

## 2018-01-22 RX ADMIN — LACTULOSE 40 G: 20 SOLUTION ORAL at 12:01

## 2018-01-22 RX ADMIN — LIDOCAINE HYDROCHLORIDE 120 MG: 40 SOLUTION TOPICAL at 02:01

## 2018-01-22 RX ADMIN — OCTREOTIDE ACETATE 100 MCG: 100 INJECTION, SOLUTION INTRAVENOUS; SUBCUTANEOUS at 10:01

## 2018-01-22 RX ADMIN — ATORVASTATIN CALCIUM 40 MG: 20 TABLET, FILM COATED ORAL at 08:01

## 2018-01-22 RX ADMIN — LEVETIRACETAM 500 MG: 500 TABLET ORAL at 09:01

## 2018-01-22 RX ADMIN — VANCOMYCIN HYDROCHLORIDE 2000 MG: 1 INJECTION, POWDER, LYOPHILIZED, FOR SOLUTION INTRAVENOUS at 01:01

## 2018-01-22 RX ADMIN — VANCOMYCIN HYDROCHLORIDE 1500 MG: 1 INJECTION, POWDER, LYOPHILIZED, FOR SOLUTION INTRAVENOUS at 12:01

## 2018-01-22 RX ADMIN — MAGNESIUM OXIDE TAB 400 MG (241.3 MG ELEMENTAL MG) 400 MG: 400 (241.3 MG) TAB at 08:01

## 2018-01-22 RX ADMIN — DOCUSATE SODIUM 250 MG: 50 CAPSULE, LIQUID FILLED ORAL at 08:01

## 2018-01-22 RX ADMIN — PANTOPRAZOLE SODIUM 40 MG: 40 TABLET, DELAYED RELEASE ORAL at 05:01

## 2018-01-22 RX ADMIN — CIPROFLOXACIN 400 MG: 2 INJECTION, SOLUTION INTRAVENOUS at 11:01

## 2018-01-22 RX ADMIN — LACTULOSE 60 G: 20 SOLUTION ORAL at 05:01

## 2018-01-22 NOTE — PROGRESS NOTES
"[facility] Hospital Medicine  Progress Note    Patient Name: Alon Adamson  MRN: [id number]  Patient Class: IP- Inpatient   Admission Date: 1/20/2018  Length of Stay: 1 days  Attending Physician: Darin Mccarthy MD  Primary Care Provider: Mckinley Vides MD    [facility] Medicine Team: [facility] MED L Darin Mccarthy MD    Subjective:     Principal Problem:Decompensated hepatic cirrhosis    HPI:  62M with cirrhosis presents with recent worsening of abdominal distension and lower extremity edema over the past 1-2 weeks.  Previously his diuretic regimen of lasix 40mg bid and spironolactone 150mg bid was effective in keeping excess fluid off, and he has never been this edematous before.  Other than this he denies any other complaints.  Upon more targeted questioning he admits that about 2 weeks ago his venous stasis ulcers on his shins were more warm and erythematous, and he had some drainage from wounds on his right shin.  He thinks he may have had some fevers then, but those have since resolved and the redness is improved.  His son has been bandaging the wounds and applying silvadene to them.  He does admit to being a bit more fatigued lately.  Appetite has been non-existent for "years" but he makes himself eat regularly.  He denies any hypoglycemic episodes.  He has nearly completed transplant workup which was deferred in 12/2016 for a positive nuclear stress test, for which he underwent PCI and stenting in 2/2017.  Re-presentation to selection committee is pending follow-up repeat stress test which has yet to be completed due to scheduling conflicts on account of family issues.  He has not experienced any cardiovascular symptoms.      Hospital Course:  No notes on file    Interval History: patient's count's dropped significantly this morning with hemoglobin of 5.7; had a BM that just was brown on my examination; STAT transfusion of 2 units PRBC  - EGD done which did not reveal a source of bleed  - " will get CT ab/pelv to r/o hematoma; IR paracentesis also pending; started on abx; has severe PCN allergy with anaphylaxis;   - family at the bedside and updated on plan  - if CT negative, would need C-scope plus or minus pill study; needs 3 to 4 BMs daily    Review of Systems   Constitutional: Negative for activity change and appetite change.   HENT: Negative for drooling and facial swelling.    Eyes: Negative for discharge and itching.   Respiratory: Negative for cough, shortness of breath and wheezing.    Cardiovascular: Negative for chest pain and palpitations.   Gastrointestinal: Negative for abdominal pain and nausea.   Genitourinary: Negative for difficulty urinating and dysuria.   Skin: Negative for color change and pallor.   Neurological: Negative for dizziness and numbness.   Psychiatric/Behavioral: Negative for behavioral problems and confusion.     Objective:     Vital Signs (Most Recent):  Temp: 98.3 °F (36.8 °C) (01/22/18 1510)  Pulse: 62 (01/22/18 1510)  Resp: 18 (01/22/18 1510)  BP: (!) 146/44 (01/22/18 1510)  SpO2: 97 % (01/22/18 1510) Vital Signs (24h Range):  Temp:  [98 °F (36.7 °C)-99 °F (37.2 °C)] 98.3 °F (36.8 °C)  Pulse:  [58-76] 62  Resp:  [16-20] 18  SpO2:  [95 %-99 %] 97 %  BP: (102-146)/(44-60) 146/44     Weight: (!) 146 kg (321 lb 14 oz)  Body mass index is 41.33 kg/m².    Intake/Output Summary (Last 24 hours) at 01/22/18 1541  Last data filed at 01/22/18 1353   Gross per 24 hour   Intake          1426.67 ml   Output              805 ml   Net           621.67 ml      Physical Exam   Constitutional: He is oriented to person, place, and time. He appears well-developed and well-nourished.   HENT:   Head: Normocephalic and atraumatic.   Eyes: Conjunctivae are normal. Pupils are equal, round, and reactive to light. Scleral icterus is present.   Neck: Normal range of motion. No thyromegaly present.   Cardiovascular: Normal rate, regular rhythm and normal heart sounds.    Pulmonary/Chest: Effort  normal and breath sounds normal.   Abdominal: Soft. He exhibits distension. There is no tenderness.   Neurological: He is alert and oriented to person, place, and time. Coordination normal.   Skin: No rash noted. No erythema.       Significant Labs:   CBC:   Recent Labs  Lab 01/20/18 2330 01/22/18  0738 01/22/18  0849   WBC 4.56 1.63* 1.51*   HGB 8.0* 5.5* 5.6*   HCT 24.4* 16.1* 16.4*   PLT 83* 33* 33*     CMP:   Recent Labs  Lab 01/20/18 2330 01/22/18  0559    135*   K 4.2 4.4   * 113*   CO2 17* 15*   * 157*   BUN 16 16   CREATININE 1.8* 1.8*   CALCIUM 8.6* 8.0*   PROT 5.4* 4.4*   ALBUMIN 2.2* 2.3*   BILITOT 2.0* 1.6*   ALKPHOS 100 64   AST 59* 40   ALT 34 22   ANIONGAP 8 7*   EGFRNONAA 39.7* 39.7*     Coagulation:   Recent Labs  Lab 01/20/18 2330 01/22/18  0559   INR 1.4* 1.7*   APTT 25.7  --        Significant Imaging: I have reviewed all pertinent imaging results/findings within the past 24 hours.    Assessment/Plan:      * Decompensated hepatic cirrhosis    # Alcoholic cirrhosis with ascites  MELD-Na score: 21 at 1/22/2018  5:59 AM  MELD score: 20 at 1/22/2018  5:59 AM  Calculated from:  Serum Creatinine: 1.8 mg/dL at 1/22/2018  5:59 AM  Serum Sodium: 135 mmol/L at 1/22/2018  5:59 AM  Total Bilirubin: 1.6 mg/dL at 1/22/2018  5:59 AM  INR(ratio): 1.7 at 1/22/2018  5:59 AM  Age: 62 years   - hepatology consult  - outpatient eval on hold until dobutamine stress test done, may need to initiate inpatient eval             Pancytopenia    - unclear why; possible infection  - DIC labs yesterday were negative, getting repeat fibrinogen        Ascites due to alcoholic cirrhosis    - IR paracentesis ordered         Acute blood loss anemia    - unclear etiology  - transfusing 2 units of PRBC now  - EGD negative for source of bleed  - re-checking DIC labs   - getting CT ab/pelv to r/o retroperitoneal hematoma  - if all negative will need C-scope plus or minus pill study        VICKY (acute kidney injury)     - unclear currently, but possibly due to blood loss  - Cr stable at 1.8, same as yesterday, however baseline of 1.2  - cont albumin and octreotide;           Impetigo    Cellulitis to B/L LE extremities due to suspected staph and strep species   Likely extant bacterial infection in BLE, R>L.  Tx with Vancomycin and local wound care as mentioned above.          Coronary artery disease involving native coronary artery of native heart without angina pectoris    S/p PCI 2/2017.  Took dual antiplatelet therapy x 1 month afterwards, but just on aspirin, statin, and beta-blocker now.  Needs repeat pharmacologic stress echo to be re-presented to selection committee.  Will plan for this once patient's blood counts have stabilized           Hypoalbuminemia    - PAB; boost        Seizures    - cont keppra        Bilateral edema of lower extremity    Due to liver failure and hypoalbuminemia with venous stasis ulcers  - wound care consult          Hepatic encephalopathy    - patient still seems a little confused and lethargic; ammonia elevated; had 1 BM yesterday, had one this AM; will increase lactulose to 60 g PO q4; cont rifaximin and add zinc; may need enema later; needs 3 to 4 BMs daily          Diastolic dysfunction    - chronic; holding on diuretics due to VICKY  - getting 2d echo            VTE Risk Mitigation         Ordered     Reason for No Pharmacological VTE Prophylaxis  Once      01/21/18 0418     Reason for no Mechanical VTE Prophylaxis  Once      01/21/18 0418     Medium Risk of VTE  Once      01/21/18 0418              Darin Mccarthy MD  Department of Hospital Medicine   [facility]

## 2018-01-22 NOTE — INTERVAL H&P NOTE
The patient has been examined and the H&P has been reviewed:    I concur with the findings and no changes have occurred since H&P was written.    Anesthesia/Surgery risks, benefits and alternative options discussed and understood by patient/family.    Pre-Procedure H and P Addendum    Patient seen and examined.  History and exam unchanged from prior history and physical.      Procedure: EGD  Indication: melena, drop in Hg in setting of cirrhosis   ASA Class: per anesthesiology  Airway: per anesthesia  Neck Mobility: full range of motion  Mallampatti score: per anesthesia  History of anesthesia problems: no  Family history of anesthesia problems: no  Anesthesia Plan: per anesthesia        Active Hospital Problems    Diagnosis  POA    *Decompensated hepatic cirrhosis [K72.90]  Yes    Impetigo [L01.00]  Yes    VICKY (acute kidney injury) [N17.9]  Yes    Coronary artery disease involving native coronary artery of native heart without angina pectoris [I25.10]  Yes    Anemia of chronic disease [D63.8]  Yes    Hypoalbuminemia [E88.09]  Yes    Thrombocytopenia [D69.6]  Yes    Cirrhosis of liver without ascites [K74.60]  Yes    Seizures [R56.9]  Yes    Hepatic encephalopathy [K72.90]  Yes    Bilateral edema of lower extremity [R60.0]  Yes    Diastolic dysfunction [I51.9]  Yes    Pulmonary hypertension, mild [I27.20]  Yes      Resolved Hospital Problems    Diagnosis Date Resolved POA   No resolved problems to display.

## 2018-01-22 NOTE — ASSESSMENT & PLAN NOTE
- unclear currently, but possibly due to blood loss  - Cr stable at 1.8, same as yesterday, however baseline of 1.2  - cont albumin and octreotide;

## 2018-01-22 NOTE — PROVATION PATIENT INSTRUCTIONS
Discharge Summary/Instructions after an Endoscopic Procedure  Patient Name: Alon Adamson  Patient MRN: [id number]  Patient YOB: 1955  Monday, January 22, 2018  Anju Myles MD  RESTRICTIONS:  During your procedure today, you received medications for sedation.  These   medications may affect your judgment, balance and coordination.  Therefore,   for 24 hours, you have the following restrictions:   - DO NOT drive a car, operate machinery, make legal/financial decisions,   sign important papers or drink alcohol.    ACTIVITY:  The following day: return to full activity including work, except no heavy   lifting, straining or running for 3 days if polyps were removed.  DIET:  Eat and drink normally unless instructed otherwise.     TREATMENT FOR COMMON SIDE EFFECTS:  - Mild abdominal pain, belching, bloating or excessive gas: rest, eat   lightly and use a heating pad.  - Sore Throat: treat with throat lozenges and/or gargle with warm salt   water.  SYMPTOMS TO WATCH FOR AND REPORT TO YOUR PHYSICIAN:  1. Abdominal pain or bloating, other than gas cramps.  2. Chest pain.  3. Back pain.  4. Chills or fever occurring within 24 hours after the procedure.  5. Rectal bleeding, which would show as bright red, maroon, or black stools.   (A tablespoon of blood from the rectum is not serious, especially if   hemorrhoids are present.)  6. Vomiting.  7. Weakness or dizziness.  8. Because air was used during the procedure, expelling large amounts of air   from your rectum or belching is normal.  9. If a bowel prep was taken, you may not have a bowel movement for 1-3   days.  This is normal.  GO DIRECTLY TO THE NEAREST EMERGENCY ROOM IF YOU HAVE ANY OF THE FOLLOWING:      Difficulty breathing  Chills and/or fever over 101 F   Persistent vomiting and/or vomiting blood   Severe abdominal pain   Severe chest pain   Black, tarry stools   Bleeding- more than one tablespoon   Any other symptom or condition that you may feel needs  urgent attention  Your doctor recommends these additional instructions:  If any biopsies were taken, your doctor s clinic will contact you in 1 to 2   weeks with any results.  Resume your previous diet.   Continue your present medications.   Your physician has recommended a repeat upper endoscopy at appointment to be   scheduled.   The findings and recommendations were discussed with your family.  For questions, problems or results please call your physician - Anju Myles MD at Work:  [phone].  [facility], EMERGENCY ROOM PHONE NUMBER: [phone]  IF A COMPLICATION OR EMERGENCY SITUATION ARISES AND YOU ARE UNABLE TO REACH   YOUR PHYSICIAN - GO DIRECTLY TO THE EMERGENCY ROOM.  Anju Myles MD  1/22/2018 3:21:36 PM  This report has been verified and signed electronically.

## 2018-01-22 NOTE — ASSESSMENT & PLAN NOTE
S/p PCI 2/2017.  Took dual antiplatelet therapy x 1 month afterwards, but just on aspirin, statin, and beta-blocker now.  Needs repeat pharmacologic stress echo to be re-presented to selection committee.  Will plan for this once patient's blood counts have stabilized

## 2018-01-22 NOTE — ASSESSMENT & PLAN NOTE
- unclear etiology  - transfusing 2 units of PRBC now  - EGD negative for source of bleed  - re-checking DIC labs   - getting CT ab/pelv to r/o retroperitoneal hematoma  - if all negative will need C-scope plus or minus pill study

## 2018-01-22 NOTE — SUBJECTIVE & OBJECTIVE
Interval History: patient's count's dropped significantly this morning with hemoglobin of 5.7; had a BM that just was brown on my examination; STAT transfusion of 2 units PRBC  - EGD done which did not reveal a source of bleed  - will get CT ab/pelv to r/o hematoma; IR paracentesis also pending; started on abx; has severe PCN allergy with anaphylaxis;   - family at the bedside and updated on plan  - if CT negative, would need C-scope plus or minus pill study; needs 3 to 4 BMs daily    Review of Systems   Constitutional: Negative for activity change and appetite change.   HENT: Negative for drooling and facial swelling.    Eyes: Negative for discharge and itching.   Respiratory: Negative for cough, shortness of breath and wheezing.    Cardiovascular: Negative for chest pain and palpitations.   Gastrointestinal: Negative for abdominal pain and nausea.   Genitourinary: Negative for difficulty urinating and dysuria.   Skin: Negative for color change and pallor.   Neurological: Negative for dizziness and numbness.   Psychiatric/Behavioral: Negative for behavioral problems and confusion.     Objective:     Vital Signs (Most Recent):  Temp: 98.3 °F (36.8 °C) (01/22/18 1510)  Pulse: 62 (01/22/18 1510)  Resp: 18 (01/22/18 1510)  BP: (!) 146/44 (01/22/18 1510)  SpO2: 97 % (01/22/18 1510) Vital Signs (24h Range):  Temp:  [98 °F (36.7 °C)-99 °F (37.2 °C)] 98.3 °F (36.8 °C)  Pulse:  [58-76] 62  Resp:  [16-20] 18  SpO2:  [95 %-99 %] 97 %  BP: (102-146)/(44-60) 146/44     Weight: (!) 146 kg (321 lb 14 oz)  Body mass index is 41.33 kg/m².    Intake/Output Summary (Last 24 hours) at 01/22/18 1541  Last data filed at 01/22/18 1353   Gross per 24 hour   Intake          1426.67 ml   Output              805 ml   Net           621.67 ml      Physical Exam   Constitutional: He is oriented to person, place, and time. He appears well-developed and well-nourished.   HENT:   Head: Normocephalic and atraumatic.   Eyes: Conjunctivae are normal.  Pupils are equal, round, and reactive to light. Scleral icterus is present.   Neck: Normal range of motion. No thyromegaly present.   Cardiovascular: Normal rate, regular rhythm and normal heart sounds.    Pulmonary/Chest: Effort normal and breath sounds normal.   Abdominal: Soft. He exhibits distension. There is no tenderness.   Neurological: He is alert and oriented to person, place, and time. Coordination normal.   Skin: No rash noted. No erythema.       Significant Labs:   CBC:   Recent Labs  Lab 01/20/18 2330 01/22/18  0738 01/22/18  0849   WBC 4.56 1.63* 1.51*   HGB 8.0* 5.5* 5.6*   HCT 24.4* 16.1* 16.4*   PLT 83* 33* 33*     CMP:   Recent Labs  Lab 01/20/18 2330 01/22/18  0559    135*   K 4.2 4.4   * 113*   CO2 17* 15*   * 157*   BUN 16 16   CREATININE 1.8* 1.8*   CALCIUM 8.6* 8.0*   PROT 5.4* 4.4*   ALBUMIN 2.2* 2.3*   BILITOT 2.0* 1.6*   ALKPHOS 100 64   AST 59* 40   ALT 34 22   ANIONGAP 8 7*   EGFRNONAA 39.7* 39.7*     Coagulation:   Recent Labs  Lab 01/20/18 2330 01/22/18  0559   INR 1.4* 1.7*   APTT 25.7  --        Significant Imaging: I have reviewed all pertinent imaging results/findings within the past 24 hours.

## 2018-01-22 NOTE — PROGRESS NOTES
Consulted for skin concerns   Bilateral lower extremities dry with severe xerosis and hyperkeratotic skin . Has hemosiderin staining of lower legs. Son who is in high school has been caring for his lower extremities fr the last 7 years.   Cleaned lower legs with easy clenz and moisturized with Sween  24 moisturizer.  Has edematous scrotum as well and some denuded skin on the left side. Nursing has ordered a scrotal support . Will see if that will assist in providing comfort. Suggesting a microbead pillow to elevate scrotum rather than toweling. Will have nursing apply critic aid clear to scrotum twice daily to moisturize the area .       01/22/18 1345   Mouth/Teeth WDL   Mouth/Teeth WDL ex;teeth   Teeth Symptoms tooth/teeth missing   Cognitive   Cognitive/Neuro/Behavioral WDL WDL   Level of Consciousness (AVPU) alert   Respiratory   Respiratory WDL WDL   Rhythm/Pattern, Respiratory depth regular;pattern regular   Cardiac   Cardiac WDL WDL   Cardiac Rhythm radial pulse regular;apical pulse regular   Gastrointestinal   GI WDL ex   Abdominal Appearance distended   Skin   Skin WDL ex   Skin Color Other (see comments)  (bruised)       Wound 01/21/18 1034 Other;Venous ulcer lower Leg   Date First Assessed/Time First Assessed: 01/21/18 1034   Pre-existing: Yes  Wound Type: Other;Venous ulcer  Side: Left  Orientation: lower  Location: Leg   Wound Image    Wound WDL ex   Dressing Appearance Dry;Intact;No dressing   Drainage Characteristics/Odor No odor   Appearance Red;Dry;Other (see comments)  (Hemociderin staining)   Care Cleansed with:;Soap and water;Applied:;Moisturizing agent       Wound 01/21/18 1035 Venous ulcer lower Leg   Date First Assessed/Time First Assessed: 01/21/18 1035   Wound Type: Venous ulcer  Side: Right  Orientation: lower  Location: Leg   Wound Image    Wound WDL ex   Dressing Appearance Dry;Intact;No dressing   Drainage Amount None   Drainage Characteristics/Odor No odor   Appearance Dry  (hemociderin  staining)   Periwound Area Dry   Care Cleansed with:;Soap and water;Applied:;Moisturizing agent   Safety   Safety WDL WDL   Safety Interventions   Patient Rounds visualized patient   Safety Promotion/Fall Prevention Fall Risk reviewed with patient/family   Safety Bands on Patient Fall Risk Band;Allergy Band       Rickie Davis RN CWON  [id number]

## 2018-01-22 NOTE — ASSESSMENT & PLAN NOTE
# Alcoholic cirrhosis with ascites  MELD-Na score: 21 at 1/22/2018  5:59 AM  MELD score: 20 at 1/22/2018  5:59 AM  Calculated from:  Serum Creatinine: 1.8 mg/dL at 1/22/2018  5:59 AM  Serum Sodium: 135 mmol/L at 1/22/2018  5:59 AM  Total Bilirubin: 1.6 mg/dL at 1/22/2018  5:59 AM  INR(ratio): 1.7 at 1/22/2018  5:59 AM  Age: 62 years   - hepatology consult  - outpatient eval on hold until dobutamine stress test done, may need to initiate inpatient eval

## 2018-01-22 NOTE — H&P
Inpatient Radiology Pre-procedure Note    History of Present Illness:  Alon Adamson is a 62 y.o. male who presents for diagnostic paracentesis. Plts 33.  Admission H&P reviewed.  Past Medical History:   Diagnosis Date    Anticoagulant long-term use     Arthritis     Back pain     Cellulitis     Cirrhosis     Coronary artery disease     DM (diabetes mellitus)     Hearing loss     right ear    Hepatic encephalopathy     Hypertension     diagnosed today (11/25/2015) and prescribed toprol    Leg edema     Nephrolithiasis     JACK (obstructive sleep apnea)     Seizures     Splenomegaly      Past Surgical History:   Procedure Laterality Date    APPENDECTOMY      Open    BACK SURGERY      CHOLECYSTECTOMY      laprascopic    LUMBAR DISCECTOMY      SPLENIC ARTERY EMBOLIZATION  04/08/2016    Dr Taveras    TONSILLECTOMY         Review of Systems:   As documented in primary team H&P    Home Meds:   Prior to Admission medications    Medication Sig Start Date End Date Taking? Authorizing Provider   ascorbic acid (VITAMIN C) 1000 MG tablet Take 1,000 mg by mouth once daily.   Yes Historical Provider, MD   aspirin (ECOTRIN) 81 MG EC tablet Take 81 mg by mouth once daily.   Yes Historical Provider, MD   atorvastatin (LIPITOR) 40 MG tablet TAKE 1 TABLET(40 MG) BY MOUTH EVERY DAY 12/31/17  Yes Elan Marin MD   docusate sodium (COLACE) 100 MG capsule Take 240 mg by mouth once daily.   Yes Historical Provider, MD   ferrous sulfate 325 mg (65 mg iron) Tab tablet Take 325 mg by mouth daily with breakfast.   Yes Historical Provider, MD   fish oil-omega-3 fatty acids 300-1,000 mg capsule Take 2 capsules (2 g total) by mouth once daily. 11/25/15  Yes Deya Cohen MD   furosemide (LASIX) 40 MG tablet Take 1 tablet (40 mg total) by mouth 2 (two) times daily. 12/15/17  Yes Miriam García MD   GENERLAC 10 gram/15 mL solution Take 60 mLs (40 g total) by mouth 3 (three) times daily. 60mg three times per day 12/15/17   Yes Miriam García MD   glyBURIDE-metformin 2.5-500 mg (GLUCOVANCE) 2.5-500 mg per tablet Take 2 tablets by mouth once daily. & 1 tab po PM 10/3/15  Yes Historical Provider, MD   levETIRAcetam (KEPPRA) 500 MG Tab Take 1 tablet (500 mg total) by mouth 2 (two) times daily. 12/15/17  Yes Miriam García MD   magnesium oxide (MAG-OX) 400 mg tablet Take 1 tablet (400 mg total) by mouth once daily. 9/16/16  Yes Miriam García MD   melatonin 3 mg Tab Take 3 mg by mouth 2 (two) times daily as needed.   Yes Historical Provider, MD   metoprolol tartrate (LOPRESSOR) 25 MG tablet Take 0.5 tablets (12.5 mg total) by mouth 2 (two) times daily. 2/14/17 2/14/18 Yes Yoselin Nunes MD   ondansetron (ZOFRAN) 4 MG tablet Take 1 tablet (4 mg total) by mouth every 8 (eight) hours as needed for Nausea. 12/15/17  Yes Miriam aGrcía MD   rifAXIMin (XIFAXAN) 550 mg Tab Take 1 tablet (550 mg total) by mouth 2 (two) times daily. 12/15/17  Yes Miriam García MD   spironolactone (ALDACTONE) 100 MG tablet Take 1.5 tablets (150 mg total) by mouth 2 (two) times daily. 12/15/17 12/15/18 Yes Miriam García MD   tramadol (ULTRAM) 50 mg tablet Take 50 mg by mouth every 6 (six) hours as needed for Pain.    Historical Provider, MD     Scheduled Meds:    atorvastatin  40 mg Oral Daily    ciprofloxacin (CIPRO)400mg/200ml D5W IVPB  400 mg Intravenous Q12H    docusate sodium  250 mg Oral Daily    ferrous sulfate  325 mg Oral Daily    lactulose  60 g Oral Q6H    levETIRAcetam  500 mg Oral BID    magnesium oxide  400 mg Oral Daily    octreotide  100 mcg Intravenous Q8H    pantoprazole  40 mg Oral BID AC    rifAXIMin  550 mg Oral BID    vancomycin (VANCOCIN) IVPB (custom)  1,500 mg Intravenous Q12H     Continuous Infusions:   PRN Meds:sodium chloride, dextrose 50%, dextrose 50%, glucagon (human recombinant), glucose, glucose, insulin aspart, ondansetron, sodium chloride 0.9%, sodium chloride 0.9%, sodium chloride 0.9%  Anticoagulants/Antiplatelets: no  anticoagulation    Allergies:   Review of patient's allergies indicates:   Allergen Reactions    Nsaids (non-steroidal anti-inflammatory drug)      D/t to liver disease.    Tylenol [acetaminophen]      D/t liver disease.    Penicillins Other (See Comments)     Since childhood was told not to take it.     Sedation Hx: have not been any systemic reactions    Labs:    Recent Labs  Lab 01/22/18  0559   INR 1.7*       Recent Labs  Lab 01/22/18  0849   WBC 1.51*   HGB 5.6*   HCT 16.4*   *   PLT 33*      Recent Labs  Lab 01/22/18  0559   *   *   K 4.4   *   CO2 15*   BUN 16   CREATININE 1.8*   CALCIUM 8.0*   MG 2.0   ALT 22   AST 40   ALBUMIN 2.3*   BILITOT 1.6*         Vitals:  Temp: 98.6 °F (37 °C) (01/22/18 1545)  Pulse: 61 (01/22/18 1603)  Resp: 20 (01/22/18 1603)  BP: 122/63 (01/22/18 1603)  SpO2: 98 % (01/22/18 1603)     Physical Exam:  ASA: 3  Mallampati: 1    General: no acute distress  Mental Status: alert and oriented to person, place and time  HEENT: normocephalic, atraumatic  Chest: unlabored breathing  Heart: regular heart rate  Abdomen: nondistended  Extremity: moves all extremities    Plan: ir paracentesis  Sedation Plan: local only    Sampson Cole MD  Department of Radiology  Pager: [phone]

## 2018-01-22 NOTE — PROCEDURES
Radiology Post-Procedure Note    Pre Op Diagnosis: Ascites  Post Op Diagnosis: Same    Procedure: Paracentesis    Procedure performed by: Sampson Cole MD    Written Informed Consent Obtained: Yes  Specimen Removed: yes, clear yellow fluid, refer to imaging report for details.  Estimated Blood Loss: Minimal    Findings:   Successful paracentesis.  Albumin administered PRN per protocol.    Patient tolerated procedure well.    Sampson Cole MD

## 2018-01-22 NOTE — PROGRESS NOTES
Pt to rm 189 for paracentesis. Dr Cole to bedside. eh Catheter placed to LLQ with use of ultrasound guidance. No complaints or signs of distress. Will continue to monitor.

## 2018-01-22 NOTE — NURSING TRANSFER
Nursing Transfer Note      1/22/2018     Transfer To: IR From: [facility] 30    Transfer via stretcher    Transfer with IV pump/cardiac monitor    Transported by RN    Medicines sent: Ocetreatide gtt    Chart send with patient: Yes    Notified: TSU RN    Patient reassessed at: 1/22/18 (date, time)    Upon arrival to floor: patient oriented to room, call bell in reach and bed in lowest position

## 2018-01-22 NOTE — ANESTHESIA PREPROCEDURE EVALUATION
01/22/2018  Alon Adamson is a 62 y.o., male pt came into hospital 2/2 edema related to liver disease and was noted to have hgb of 5 and was transfused 2uprbc and is scheduled for below procedure    Pre-operative evaluation for Procedure(s) (LRB):  ESOPHAGOGASTRODUODENOSCOPY (EGD) (N/A)    Alon Adamson is a 62 y.o. male     Patient Active Problem List   Diagnosis    Diastolic dysfunction    Pulmonary hypertension, mild    Hepatic encephalopathy    Bilateral edema of lower extremity    Seizures    Cirrhosis of liver without ascites    Hypoalbuminemia    Thrombocytopenia    Anemia of chronic disease    Abnormal cardiovascular stress test    Abnormal dobutamine stress echocardiogram    Coronary artery disease involving native coronary artery of native heart without angina pectoris    Decompensated hepatic cirrhosis    Impetigo    VICKY (acute kidney injury)       Review of patient's allergies indicates:   Allergen Reactions    Nsaids (non-steroidal anti-inflammatory drug)      D/t to liver disease.    Tylenol [acetaminophen]      D/t liver disease.    Penicillins Other (See Comments)     Since childhood was told not to take it.       No current facility-administered medications on file prior to encounter.      Current Outpatient Prescriptions on File Prior to Encounter   Medication Sig Dispense Refill    ascorbic acid (VITAMIN C) 1000 MG tablet Take 1,000 mg by mouth once daily.      aspirin (ECOTRIN) 81 MG EC tablet Take 81 mg by mouth once daily.      atorvastatin (LIPITOR) 40 MG tablet TAKE 1 TABLET(40 MG) BY MOUTH EVERY DAY 30 tablet 0    docusate sodium (COLACE) 100 MG capsule Take 240 mg by mouth once daily.      ferrous sulfate 325 mg (65 mg iron) Tab tablet Take 325 mg by mouth daily with breakfast.      fish oil-omega-3 fatty acids 300-1,000 mg capsule Take 2 capsules  (2 g total) by mouth once daily.      furosemide (LASIX) 40 MG tablet Take 1 tablet (40 mg total) by mouth 2 (two) times daily. 180 tablet 1    GENERLAC 10 gram/15 mL solution Take 60 mLs (40 g total) by mouth 3 (three) times daily. 60mg three times per day 5400 mL 6    glyBURIDE-metformin 2.5-500 mg (GLUCOVANCE) 2.5-500 mg per tablet Take 2 tablets by mouth once daily. & 1 tab po PM  10    levETIRAcetam (KEPPRA) 500 MG Tab Take 1 tablet (500 mg total) by mouth 2 (two) times daily. 180 tablet 1    magnesium oxide (MAG-OX) 400 mg tablet Take 1 tablet (400 mg total) by mouth once daily.  0    melatonin 3 mg Tab Take 3 mg by mouth 2 (two) times daily as needed.      metoprolol tartrate (LOPRESSOR) 25 MG tablet Take 0.5 tablets (12.5 mg total) by mouth 2 (two) times daily. 30 tablet 11    ondansetron (ZOFRAN) 4 MG tablet Take 1 tablet (4 mg total) by mouth every 8 (eight) hours as needed for Nausea. 15 tablet 3    rifAXIMin (XIFAXAN) 550 mg Tab Take 1 tablet (550 mg total) by mouth 2 (two) times daily. 60 tablet 5    spironolactone (ALDACTONE) 100 MG tablet Take 1.5 tablets (150 mg total) by mouth 2 (two) times daily. 270 tablet 1    tramadol (ULTRAM) 50 mg tablet Take 50 mg by mouth every 6 (six) hours as needed for Pain.         Past Surgical History:   Procedure Laterality Date    APPENDECTOMY      Open    BACK SURGERY      CHOLECYSTECTOMY      laprascopic    LUMBAR DISCECTOMY      SPLENIC ARTERY EMBOLIZATION  04/08/2016    Dr Taveras    TONSILLECTOMY         Social History     Social History    Marital status:      Spouse name: N/A    Number of children: N/A    Years of education: N/A     Occupational History    Not on file.     Social History Main Topics    Smoking status: Never Smoker    Smokeless tobacco: Current User     Types: Chew    Alcohol use No    Drug use: No    Sexual activity: Not on file     Other Topics Concern    Not on file     Social History Narrative    No  narrative on file         Vital Signs Range (Last 24H):  Temp:  [36.7 °C (98 °F)-37.2 °C (99 °F)]   Pulse:  [58-76]   Resp:  [16-20]   BP: (102-128)/(50-60)   SpO2:  [95 %-99 %]       CBC:   Recent Labs      18   0738  18   0849   WBC  1.63*  1.51*   RBC  1.58*  1.60*   HGB  5.5*  5.6*   HCT  16.1*  16.4*   PLT  33*  33*   MCV  102*  103*   MCH  34.8*  35.0*   MCHC  34.2  34.1       CMP:   Recent Labs      18   2330  18   0559   NA  138  135*   K  4.2  4.4   CL  113*  113*   CO2  17*  15*   BUN  16  16   CREATININE  1.8*  1.8*   GLU  137*  157*   MG  1.7  2.0   CALCIUM  8.6*  8.0*   ALBUMIN  2.2*  2.3*   PROT  5.4*  4.4*   ALKPHOS  100  64   ALT  34  22   AST  59*  40   BILITOT  2.0*  1.6*       INR  Recent Labs      18   2330  18   0559   INR  1.4*  1.7*   APTT  25.7   --            Diagnostic Studies:      EKD Echo:2017    CONCLUSIONS: ABNORMAL MYOCARDIAL PERFUSION PET STRESS TEST  1. There is a very small sized mild ischemia in the inferoapical wall in the usual distribution of the Posterior Descending Artery. This defect comprises < 5 % of the left ventricular myocardium.   2. Resting wall motion is physiologic. Stress wall motion is physiologic.   3. LV function is normal at rest and stress.  (normal is >= 51%)  4. The ventricular volumes are normal at rest and stress.   5. The extracardiac distribution of radioactivity is normal.   6. There was no previous study available to compare.  Anesthesia Evaluation    I have reviewed the Patient Summary Reports.     I have reviewed the Medications.     Review of Systems  Anesthesia Hx:  No problems with previous Anesthesia  History of prior surgery of interest to airway management or planning:  Denies Personal Hx of Anesthesia complications.   Social:  Former Smoker    Hematology/Oncology:         -- Anemia:   Cardiovascular:   Hypertension CAD  CABG/stent     Pulmonary:   Sleep Apnea    Hepatic/GI:   Liver Disease,     Neurological:   Hepatic encephalopathy   Endocrine:   Diabetes        Physical Exam  General:  Large Beard    Airway/Jaw/Neck:  Airway Findings: Mouth Opening: Normal Tongue: Normal  General Airway Assessment: Adult  Improves to II with phonation.  TM Distance: Normal, at least 6 cm      Dental:  Dental Findings: In tact        Mental Status:  Mental Status Findings:  Cooperative, Alert and Oriented         Anesthesia Plan  Type of Anesthesia, risks & benefits discussed:  Anesthesia Type:  general  Patient's Preference:   Intra-op Monitoring Plan: standard ASA monitors  Intra-op Monitoring Plan Comments:   Post Op Pain Control Plan:   Post Op Pain Control Plan Comments:   Induction:   IV  Beta Blocker:  Patient is on a Beta-Blocker and has received one dose within the past 24 hours (No further documentation required).       Informed Consent: Patient understands risks and agrees with Anesthesia plan.  Questions answered. Anesthesia consent signed with patient.  ASA Score: 3     Day of Surgery Review of History & Physical:    H&P update referred to the surgeon.         Ready For Surgery From Anesthesia Perspective.

## 2018-01-22 NOTE — PT/OT/SLP PROGRESS
Occupational Therapy      Patient Name:  Alon Adamson   MRN:  [id number]    Patient not seen today secondary to AM attempt, RN requested hold 2* low H&H with pt to receive PRBC. In PM, pt off floor at EGD . Will follow-up at next scheduled OT session as able.    Abbey Barnard, OT  1/22/2018

## 2018-01-22 NOTE — PLAN OF CARE
"Problem: Patient Care Overview  Goal: Plan of Care Review  Patient AAO today, one BM noted, patients son flushed, stated that the stool "was not bloody", receiving lactulose.  One UM urine with bm.  WBC low 1.51.  H/H 5.5/16.1, received 2 units PRBC, currently in EGD.  Octreotide drip started today.  Will go to IR post EGD for para.  BG range 168-178 today, patient has been NPO.  Leg ulcers and scrotum assessed by wound care today, no weeping noted to legs.  SR per tele.  Patients wife and son at the bedside, very attentive to patient and involved in patients care.      "

## 2018-01-22 NOTE — PLAN OF CARE
Mckinley Vides MD  [address] [facility] [address] / [place] [id number]    Payor: BLUE CROSS BLUE SHIELD / Plan: BCBS ALL OUT OF STATE / Product Type: PPO /        01/22/18 1504   Discharge Assessment   Assessment Type Discharge Planning Assessment   Confirmed/corrected address and phone number on facesheet? Yes   Assessment information obtained from? Patient;Caregiver  (spouse at the bedside)   Expected Length of Stay (days) 5   Communicated expected length of stay with patient/caregiver yes   Prior to hospitilization cognitive status: Alert/Oriented   Prior to hospitalization functional status: Needs Assistance;Assistive Equipment   Current cognitive status: Alert/Oriented   Current Functional Status: Needs Assistance;Assistive Equipment   Lives With spouse;child(boby), dependent   Able to Return to Prior Arrangements unable to determine at this time (comments)   Is patient able to care for self after discharge? Unable to determine at this time (comments)   Who are your caregiver(s) and their phone number(s)? Vin Adamson  spouse [phone]   Patient's perception of discharge disposition home or selfcare   Patient currently receives home health services? No   Patient currently receives any other outside agency services? No   Equipment Currently Used at Home cane, straight;shower chair;CPAP   Do you have any problems affording any of your prescribed medications? No   Does the patient have transportation home? Yes   Transportation Available family or friend will provide   Discharge Plan A Home with family   Discharge Plan B Home with family;Home Health   Patient/Family In Agreement With Plan no

## 2018-01-22 NOTE — PROGRESS NOTES
Patient transported down to EGD, NPO since MN.  Patient went down with wife.  Report given to Alma, notified to send patient to IR when finished.  Will continue to monitor.

## 2018-01-22 NOTE — ASSESSMENT & PLAN NOTE
- patient still seems a little confused and lethargic; ammonia elevated; had 1 BM yesterday, had one this AM; will increase lactulose to 60 g PO q4; cont rifaximin and add zinc; may need enema later; needs 3 to 4 BMs daily

## 2018-01-22 NOTE — TRANSFER OF CARE
"Anesthesia Transfer of Care Note    Patient: Alon Adamson    Procedure(s) Performed: Procedure(s) (LRB):  ESOPHAGOGASTRODUODENOSCOPY (EGD) (N/A)    Patient location: PACU    Anesthesia Type: general    Transport from OR: Transported from OR on room air with adequate spontaneous ventilation. Transported from OR on 6-10 L/min O2 by face mask with adequate spontaneous ventilation    Post pain: adequate analgesia    Post assessment: no apparent anesthetic complications    Post vital signs: stable    Level of consciousness: awake and alert    Nausea/Vomiting: no nausea/vomiting    Complications: none    Transfer of care protocol was followed      Last vitals:   Visit Vitals  BP (!) 123/56 (BP Location: Right arm, Patient Position: Lying)   Pulse 61   Temp 36.7 °C (98.1 °F) (Temporal)   Resp 20   Ht 6' 2" (1.88 m)   Wt (!) 146 kg (321 lb 14 oz)   SpO2 99%   BMI 41.33 kg/m²     "

## 2018-01-22 NOTE — PLAN OF CARE
Problem: Patient Care Overview  Goal: Plan of Care Review  Outcome: Ongoing (interventions implemented as appropriate)  Pt AAOx4, VSS, afebrile.  Pt went for US of abdomen and scrotum during shift. IV vancomycin administered during shift.  Up to restroom independently.  Fall risk precautions initiated.  Pt in lowest bed position setting, lighting adjusted, pt to wear nonskid socks when ambulating, side rails up x2.  Pt remain free from falls during shift. Pt verbalize understanding to call when needed assistance.  Call light within reach.  Will continue to monitor.

## 2018-01-22 NOTE — ASSESSMENT & PLAN NOTE
Cellulitis to B/L LE extremities due to suspected staph and strep species   Likely extant bacterial infection in BLE, R>L.  Tx with Vancomycin and local wound care as mentioned above.

## 2018-01-22 NOTE — H&P (VIEW-ONLY)
[facility]  Hepatology  Consult Note    Patient Name: Alon Adamson  MRN: [id number]  Admission Date: 1/20/2018  Hospital Length of Stay: 0 days  Attending Provider: Darin Mccarthy MD   Primary Care Physician: Mckinley Vides MD  Principal Problem:Decompensated hepatic cirrhosis    Inpatient consult to Hepatology  Consult performed by: ELISA ARCINIEGA  Consult ordered by: WALESKA WORRELL        Subjective:     Transplant status: Pre-transplant    HPI:  This is a 63 y/o male with hx of etoh cirrhosis complicated by LE edema, hepatic encephalopathy (followed by Dr. García, deferred for transplant eval currently due to concern for CAD pending dobutamine stress test), splenic artery aneurysm s/p coiling who presents with scrotal and abdominal swelling x 1 week. Assoc with SOB and AYALA. He has noted some dark stools intermittently. Has had worsening redness and weeping over his bilatearl shins.   Initially presented to [facility] and was directed to [facility] [place] [place] for further care.   Admit to taking his lasix 40mg BID and aldactone 150mg bid as well    Review of Systems   Constitutional: Positive for fatigue and unexpected weight change. Negative for chills and fever.   HENT: Negative for facial swelling, mouth sores and trouble swallowing.    Eyes: Negative for pain and redness.   Respiratory: Positive for shortness of breath. Negative for cough.    Cardiovascular: Positive for leg swelling. Negative for chest pain.   Gastrointestinal: Negative for abdominal pain and vomiting.   Genitourinary: Positive for scrotal swelling. Negative for dysuria and hematuria.   Musculoskeletal: Negative for gait problem and neck stiffness.   Skin: Positive for rash. Negative for wound.   Neurological: Negative for seizures and headaches.   Psychiatric/Behavioral: Negative for confusion and hallucinations.       Past Medical History:   Diagnosis Date    Anticoagulant long-term use     Arthritis     Back pain      Cellulitis     Cirrhosis     Coronary artery disease     DM (diabetes mellitus)     Hearing loss     right ear    Hepatic encephalopathy     Hypertension     diagnosed today (11/25/2015) and prescribed toprol    Leg edema     Nephrolithiasis     JACK (obstructive sleep apnea)     Seizures     Splenomegaly        Past Surgical History:   Procedure Laterality Date    APPENDECTOMY      Open    BACK SURGERY      CHOLECYSTECTOMY      laprascopic    LUMBAR DISCECTOMY      SPLENIC ARTERY EMBOLIZATION  04/08/2016    Dr Taveras    TONSILLECTOMY         Family history of liver disease: No    Review of patient's allergies indicates:   Allergen Reactions    Nsaids (non-steroidal anti-inflammatory drug)      D/t to liver disease.    Tylenol [acetaminophen]      D/t liver disease.    Penicillins Other (See Comments)     Since childhood was told not to take it.       Social History Main Topics    Smoking status: Never Smoker    Smokeless tobacco: Current User     Types: Chew    Alcohol use No    Drug use: No    Sexual activity: Not on file       Prescriptions Prior to Admission   Medication Sig Dispense Refill Last Dose    ascorbic acid (VITAMIN C) 1000 MG tablet Take 1,000 mg by mouth once daily.   1/20/2018    aspirin (ECOTRIN) 81 MG EC tablet Take 81 mg by mouth once daily.   1/20/2018    atorvastatin (LIPITOR) 40 MG tablet TAKE 1 TABLET(40 MG) BY MOUTH EVERY DAY 30 tablet 0 1/20/2018 at Unknown time    docusate sodium (COLACE) 100 MG capsule Take 240 mg by mouth once daily.   1/20/2018    ferrous sulfate 325 mg (65 mg iron) Tab tablet Take 325 mg by mouth daily with breakfast.   1/20/2018    fish oil-omega-3 fatty acids 300-1,000 mg capsule Take 2 capsules (2 g total) by mouth once daily.   1/20/2018    furosemide (LASIX) 40 MG tablet Take 1 tablet (40 mg total) by mouth 2 (two) times daily. 180 tablet 1 1/20/2018    GENERLAC 10 gram/15 mL solution Take 60 mLs (40 g total) by mouth 3 (three)  times daily. 60mg three times per day 5400 mL 6 1/20/2018    glyBURIDE-metformin 2.5-500 mg (GLUCOVANCE) 2.5-500 mg per tablet Take 2 tablets by mouth once daily. & 1 tab po PM  10 1/20/2018    levETIRAcetam (KEPPRA) 500 MG Tab Take 1 tablet (500 mg total) by mouth 2 (two) times daily. 180 tablet 1 1/20/2018    magnesium oxide (MAG-OX) 400 mg tablet Take 1 tablet (400 mg total) by mouth once daily.  0 1/20/2018    melatonin 3 mg Tab Take 3 mg by mouth 2 (two) times daily as needed.   1/19/2018    metoprolol tartrate (LOPRESSOR) 25 MG tablet Take 0.5 tablets (12.5 mg total) by mouth 2 (two) times daily. 30 tablet 11 1/20/2018    ondansetron (ZOFRAN) 4 MG tablet Take 1 tablet (4 mg total) by mouth every 8 (eight) hours as needed for Nausea. 15 tablet 3 1/18/2018    rifAXIMin (XIFAXAN) 550 mg Tab Take 1 tablet (550 mg total) by mouth 2 (two) times daily. 60 tablet 5 1/20/2018    spironolactone (ALDACTONE) 100 MG tablet Take 1.5 tablets (150 mg total) by mouth 2 (two) times daily. 270 tablet 1 1/20/2018    tramadol (ULTRAM) 50 mg tablet Take 50 mg by mouth every 6 (six) hours as needed for Pain.   Taking       Objective:     Vital Signs (Most Recent):  Temp: 98.2 °F (36.8 °C) (01/21/18 1138)  Pulse: 72 (01/21/18 1138)  Resp: 18 (01/21/18 1138)  BP: (!) 110/52 (01/21/18 1138)  SpO2: 98 % (01/21/18 1138) Vital Signs (24h Range):  Temp:  [97.7 °F (36.5 °C)-98.2 °F (36.8 °C)] 98.2 °F (36.8 °C)  Pulse:  [72-97] 72  Resp:  [18-20] 18  SpO2:  [98 %-100 %] 98 %  BP: (110-151)/(52-90) 110/52     Weight: (!) 141.1 kg (311 lb 1.1 oz) (01/21/18 0300)  Body mass index is 39.94 kg/m².    Physical Exam   Constitutional: He is oriented to person, place, and time. No distress.   Nontoxic , slightly slow to respond   HENT:   Head: Normocephalic and atraumatic.   Eyes: Conjunctivae are normal. No scleral icterus.   Neck: Neck supple. No thyromegaly present.   Cardiovascular: Normal rate, regular rhythm and intact distal pulses.     Pulmonary/Chest: Effort normal and breath sounds normal. No respiratory distress.   Abdominal: Soft. There is no tenderness. There is no guarding.   Genitourinary:   Genitourinary Comments: Scrotal edema and slightly TTP without overlying redness or rash   Musculoskeletal:   2+ pitting edema with bilateral pretibial erythema and some oozing and weeping in certain spots   Neurological: He is alert and oriented to person, place, and time. No cranial nerve deficit.   Skin: Skin is warm. Rash noted.   Erythema and weeping over pretibial areas bilaterally   Psychiatric: He has a normal mood and affect. His behavior is normal.   Vitals reviewed.      MELD-Na score: 18 at 1/20/2018 11:30 PM  MELD score: 18 at 1/20/2018 11:30 PM  Calculated from:  Serum Creatinine: 1.8 mg/dL at 1/20/2018 11:30 PM  Serum Sodium: 138 mmol/L (Rounded to 137) at 1/20/2018 11:30 PM  Total Bilirubin: 2.0 mg/dL at 1/20/2018 11:30 PM  INR(ratio): 1.4 at 1/20/2018 11:30 PM  Age: 62 years    Significant Labs:  CBC:   Recent Labs  Lab 01/20/18  2330   WBC 4.56   RBC 2.29*   HGB 8.0*   HCT 24.4*   PLT 83*     CMP:   Recent Labs  Lab 01/20/18  2330   *   CALCIUM 8.6*   ALBUMIN 2.2*   PROT 5.4*      K 4.2   CO2 17*   *   BUN 16   CREATININE 1.8*   ALKPHOS 100   ALT 34   AST 59*   BILITOT 2.0*     Coagulation:   Recent Labs  Lab 01/20/18  2330   INR 1.4*   APTT 25.7       Significant Imaging:  Labs: Reviewed    Assessment/Plan:     Cirrhosis of liver without ascites    Hx of Etoh cirrhosis undergoing transplant eval. Needs dobutamine stress to complete eval.  Currently admitted with SOB , LE edema, suspected cellulitis pretibially.  Some concern on labs including anemia from baseline and VICKY    Recs:  Urine studies including Heather, UCr, Uurea, UA  Hold diuretics  Give albumin  TID today  Check PETH test  Anemia workup with B12, folate, iron studies, hemolysis labs  Watch pace /character of stooling  Lactulose and rifaximin  Wound care   C/s  IV Abx with vancomycin for cellulitis pretibially  Social work consult for transplant eval - I e what has been barrier to patient getting his final testing done    Consider dobutamine stress testing while here, pending social work eval.        VICKY (acute kidney injury)    As above        Anemia of chronic disease    As above            Thank you for your consult. I will follow-up with patient. Please contact us if you have any additional questions.    Manny Garcia MD  Hepatology  [facility]

## 2018-01-22 NOTE — PT/OT/SLP PROGRESS
Physical Therapy      Patient Name:  Alon Adamson   MRN:  [id number]    Patient not seen today secondary to  (At AM attempt, RN requested hold 2* low H&H with pt to receive PRBC. In PM, pt off floor at EGD.). Will follow-up at next scheduled session as able.    Lucille Craig, PT, DPT   1/22/2018  [phone]

## 2018-01-23 LAB
ABO + RH BLD: NORMAL
ALBUMIN SERPL BCP-MCNC: 2.1 G/DL
ALP SERPL-CCNC: 61 U/L
ALT SERPL W/O P-5'-P-CCNC: 19 U/L
AMMONIA PLAS-SCNC: 69 UMOL/L
ANION GAP SERPL CALC-SCNC: 5 MMOL/L
ANISOCYTOSIS BLD QL SMEAR: SLIGHT
ANISOCYTOSIS BLD QL SMEAR: SLIGHT
AST SERPL-CCNC: 37 U/L
BASOPHILS # BLD AUTO: 0.01 K/UL
BASOPHILS # BLD AUTO: 0.02 K/UL
BASOPHILS NFR BLD: 0.7 %
BASOPHILS NFR BLD: 0.9 %
BILIRUB SERPL-MCNC: 2.1 MG/DL
BLD GP AB SCN CELLS X3 SERPL QL: NORMAL
BUN SERPL-MCNC: 16 MG/DL
BURR CELLS BLD QL SMEAR: ABNORMAL
CALCIUM SERPL-MCNC: 7.8 MG/DL
CHLORIDE SERPL-SCNC: 114 MMOL/L
CO2 SERPL-SCNC: 17 MMOL/L
CREAT SERPL-MCNC: 1.8 MG/DL
DIASTOLIC DYSFUNCTION: NO
DIFFERENTIAL METHOD: ABNORMAL
DIFFERENTIAL METHOD: ABNORMAL
EOSINOPHIL # BLD AUTO: 0 K/UL
EOSINOPHIL # BLD AUTO: 0 K/UL
EOSINOPHIL NFR BLD: 1.9 %
EOSINOPHIL NFR BLD: 2.7 %
ERYTHROCYTE [DISTWIDTH] IN BLOOD BY AUTOMATED COUNT: 15.6 %
ERYTHROCYTE [DISTWIDTH] IN BLOOD BY AUTOMATED COUNT: 15.6 %
EST. GFR  (AFRICAN AMERICAN): 45.9 ML/MIN/1.73 M^2
EST. GFR  (NON AFRICAN AMERICAN): 39.7 ML/MIN/1.73 M^2
ESTIMATED PA SYSTOLIC PRESSURE: 30.03
GLUCOSE SERPL-MCNC: 146 MG/DL
HCT VFR BLD AUTO: 21 %
HCT VFR BLD AUTO: 23.6 %
HGB BLD-MCNC: 7 G/DL
HGB BLD-MCNC: 8 G/DL
HYPOCHROMIA BLD QL SMEAR: ABNORMAL
IMM GRANULOCYTES # BLD AUTO: 0.01 K/UL
IMM GRANULOCYTES # BLD AUTO: 0.01 K/UL
IMM GRANULOCYTES NFR BLD AUTO: 0.5 %
IMM GRANULOCYTES NFR BLD AUTO: 0.7 %
INR PPP: 1.6
LYMPHOCYTES # BLD AUTO: 0.3 K/UL
LYMPHOCYTES # BLD AUTO: 0.3 K/UL
LYMPHOCYTES NFR BLD: 11.7 %
LYMPHOCYTES NFR BLD: 22 %
MAGNESIUM SERPL-MCNC: 2 MG/DL
MCH RBC QN AUTO: 33.7 PG
MCH RBC QN AUTO: 34.3 PG
MCHC RBC AUTO-ENTMCNC: 33.3 G/DL
MCHC RBC AUTO-ENTMCNC: 33.9 G/DL
MCV RBC AUTO: 101 FL
MCV RBC AUTO: 101 FL
MONOCYTES # BLD AUTO: 0.3 K/UL
MONOCYTES # BLD AUTO: 0.5 K/UL
MONOCYTES NFR BLD: 21.3 %
MONOCYTES NFR BLD: 23 %
NEUTROPHILS # BLD AUTO: 0.8 K/UL
NEUTROPHILS # BLD AUTO: 1.3 K/UL
NEUTROPHILS NFR BLD: 52.6 %
NEUTROPHILS NFR BLD: 62 %
NRBC BLD-RTO: 0 /100 WBC
NRBC BLD-RTO: 0 /100 WBC
OVALOCYTES BLD QL SMEAR: ABNORMAL
OVALOCYTES BLD QL SMEAR: ABNORMAL
PLATELET # BLD AUTO: 31 K/UL
PLATELET # BLD AUTO: 33 K/UL
PLATELET BLD QL SMEAR: ABNORMAL
PLATELET BLD QL SMEAR: ABNORMAL
PMV BLD AUTO: 11.9 FL
PMV BLD AUTO: 12.2 FL
POCT GLUCOSE: 160 MG/DL (ref 70–110)
POCT GLUCOSE: 171 MG/DL (ref 70–110)
POCT GLUCOSE: 172 MG/DL (ref 70–110)
POIKILOCYTOSIS BLD QL SMEAR: SLIGHT
POIKILOCYTOSIS BLD QL SMEAR: SLIGHT
POTASSIUM SERPL-SCNC: 4.2 MMOL/L
PROT SERPL-MCNC: 4.2 G/DL
PROTHROMBIN TIME: 15.8 SEC
RBC # BLD AUTO: 2.08 M/UL
RBC # BLD AUTO: 2.33 M/UL
RETIRED EF AND QEF - SEE NOTES: 60 (ref 55–65)
SODIUM SERPL-SCNC: 136 MMOL/L
TRICUSPID VALVE REGURGITATION: NORMAL
VANCOMYCIN TROUGH SERPL-MCNC: 26.2 UG/ML
WBC # BLD AUTO: 1.5 K/UL
WBC # BLD AUTO: 2.13 K/UL

## 2018-01-23 PROCEDURE — 36415 COLL VENOUS BLD VENIPUNCTURE: CPT | Mod: NTX

## 2018-01-23 PROCEDURE — 85025 COMPLETE CBC W/AUTO DIFF WBC: CPT | Mod: 91,NTX

## 2018-01-23 PROCEDURE — 25000003 PHARM REV CODE 250: Mod: NTX | Performed by: HOSPITALIST

## 2018-01-23 PROCEDURE — 97165 OT EVAL LOW COMPLEX 30 MIN: CPT | Mod: NTX

## 2018-01-23 PROCEDURE — 99233 SBSQ HOSP IP/OBS HIGH 50: CPT | Mod: NTX,,, | Performed by: HOSPITALIST

## 2018-01-23 PROCEDURE — 80053 COMPREHEN METABOLIC PANEL: CPT | Mod: NTX

## 2018-01-23 PROCEDURE — 20600001 HC STEP DOWN PRIVATE ROOM: Mod: NTX

## 2018-01-23 PROCEDURE — 93306 TTE W/DOPPLER COMPLETE: CPT | Mod: 26,NTX,, | Performed by: INTERNAL MEDICINE

## 2018-01-23 PROCEDURE — 82140 ASSAY OF AMMONIA: CPT | Mod: NTX

## 2018-01-23 PROCEDURE — 83735 ASSAY OF MAGNESIUM: CPT | Mod: NTX

## 2018-01-23 PROCEDURE — 93306 TTE W/DOPPLER COMPLETE: CPT | Mod: NTX

## 2018-01-23 PROCEDURE — 97161 PT EVAL LOW COMPLEX 20 MIN: CPT | Mod: NTX

## 2018-01-23 PROCEDURE — 85610 PROTHROMBIN TIME: CPT | Mod: NTX

## 2018-01-23 PROCEDURE — 86901 BLOOD TYPING SEROLOGIC RH(D): CPT | Mod: NTX

## 2018-01-23 PROCEDURE — 63600175 PHARM REV CODE 636 W HCPCS: Mod: NTX | Performed by: HOSPITALIST

## 2018-01-23 PROCEDURE — 80202 ASSAY OF VANCOMYCIN: CPT | Mod: NTX

## 2018-01-23 RX ORDER — CIPROFLOXACIN 500 MG/1
500 TABLET ORAL EVERY 12 HOURS
Status: DISCONTINUED | OUTPATIENT
Start: 2018-01-23 | End: 2018-01-24

## 2018-01-23 RX ORDER — METOPROLOL TARTRATE 25 MG/1
12.5 TABLET ORAL 2 TIMES DAILY
Status: ON HOLD | COMMUNITY
End: 2018-01-27 | Stop reason: HOSPADM

## 2018-01-23 RX ADMIN — MAGNESIUM OXIDE TAB 400 MG (241.3 MG ELEMENTAL MG) 400 MG: 400 (241.3 MG) TAB at 08:01

## 2018-01-23 RX ADMIN — OCTREOTIDE ACETATE 100 MCG: 100 INJECTION, SOLUTION INTRAVENOUS; SUBCUTANEOUS at 05:01

## 2018-01-23 RX ADMIN — PANTOPRAZOLE SODIUM 40 MG: 40 TABLET, DELAYED RELEASE ORAL at 04:01

## 2018-01-23 RX ADMIN — RIFAXIMIN 550 MG: 550 TABLET ORAL at 08:01

## 2018-01-23 RX ADMIN — PANTOPRAZOLE SODIUM 40 MG: 40 TABLET, DELAYED RELEASE ORAL at 05:01

## 2018-01-23 RX ADMIN — LACTULOSE 60 G: 20 SOLUTION ORAL at 05:01

## 2018-01-23 RX ADMIN — VANCOMYCIN HYDROCHLORIDE 1500 MG: 1 INJECTION, POWDER, LYOPHILIZED, FOR SOLUTION INTRAVENOUS at 03:01

## 2018-01-23 RX ADMIN — LEVETIRACETAM 500 MG: 500 TABLET ORAL at 09:01

## 2018-01-23 RX ADMIN — CIPROFLOXACIN HYDROCHLORIDE 500 MG: 500 TABLET, FILM COATED ORAL at 11:01

## 2018-01-23 RX ADMIN — RIFAXIMIN 550 MG: 550 TABLET ORAL at 09:01

## 2018-01-23 RX ADMIN — LACTULOSE 60 G: 20 SOLUTION ORAL at 11:01

## 2018-01-23 RX ADMIN — LEVETIRACETAM 500 MG: 500 TABLET ORAL at 08:01

## 2018-01-23 RX ADMIN — FERROUS SULFATE TAB EC 325 MG (65 MG FE EQUIVALENT) 325 MG: 325 (65 FE) TABLET DELAYED RESPONSE at 08:01

## 2018-01-23 RX ADMIN — DOCUSATE SODIUM 250 MG: 50 CAPSULE, LIQUID FILLED ORAL at 08:01

## 2018-01-23 RX ADMIN — LACTULOSE 60 G: 20 SOLUTION ORAL at 09:01

## 2018-01-23 RX ADMIN — CIPROFLOXACIN HYDROCHLORIDE 500 MG: 500 TABLET, FILM COATED ORAL at 09:01

## 2018-01-23 RX ADMIN — ATORVASTATIN CALCIUM 40 MG: 20 TABLET, FILM COATED ORAL at 08:01

## 2018-01-23 NOTE — ANESTHESIA POSTPROCEDURE EVALUATION
"Anesthesia Post Evaluation    Patient: Alon Adamson    Procedure(s) Performed: Procedure(s) (LRB):  ESOPHAGOGASTRODUODENOSCOPY (EGD) (N/A)    Final Anesthesia Type: general  Patient location during evaluation: PACU  Patient participation: Yes- Able to Participate  Level of consciousness: awake and alert  Post-procedure vital signs: reviewed and stable  Pain management: adequate  Airway patency: patent  PONV status at discharge: No PONV  Anesthetic complications: no      Cardiovascular status: stable  Respiratory status: unassisted and spontaneous ventilation  Hydration status: euvolemic  Follow-up not needed.        Visit Vitals  BP (!) 122/58   Pulse 71   Temp 36.7 °C (98.1 °F) (Oral)   Resp 16   Ht 6' 2" (1.88 m)   Wt (!) (P) 144.2 kg (318 lb)   SpO2 96%   BMI (P) 40.83 kg/m²       Pain/Ray Score: Pain Assessment Performed: Yes (1/22/2018  9:00 PM)  Presence of Pain: denies (1/22/2018  9:00 PM)  Ray Score: 9 (1/22/2018  3:10 PM)      "

## 2018-01-23 NOTE — PLAN OF CARE
Problem: Physical Therapy Goal  Goal: Physical Therapy Goal  Goals to be met by: 18     Patient will increase functional independence with mobility by performin. Supine to sit with Supervision   2. Sit to stand transfer with Supervision  3. Gait  x 150 feet with Supervision.   4. Ascend/descend 5 stair with right Handrails Stand-by Assistance.   5. Lower extremity exercise program x15 reps, with supervision, in order to increase LE strength and (I) with functional mobility      Outcome: Ongoing (interventions implemented as appropriate)  PT evaluation complete and appropriate goals established.    Lucille Craig, PT, DPT   2018  [phone]

## 2018-01-23 NOTE — PROGRESS NOTES
Paged IM 5, no response.  Notified Gerry CALDERA of the following at the bedside:  Vitals stable, WBC 1.50, Plt prelim 31,000, Vanc Trough 26.2  Will continue to monitor.

## 2018-01-23 NOTE — ASSESSMENT & PLAN NOTE
Hx of Etoh cirrhosis undergoing transplant eval who is admitted with volume overload on 1/20/2018.   Currently being treated for with SOB , LE edema, suspected cellulitis  Some concern on labs including anemia from baseline and VICKY.   Needs dobutamine stress to complete eval    Recs:  Pending PETH  Consider CT abdomen/pelvis to evaluate anemia, if negative, we will consider EGD+colonoscopy.   Transfuse as needed  Lactulose and rifaximin  Wound care  IV Abx with vancomycin for cellulitis  Social work consult for transplant eval  Consider dobutamine stress testing while here, pending social work eval.

## 2018-01-23 NOTE — PLAN OF CARE
Problem: Patient Care Overview  Goal: Plan of Care Review  Pt aaox4 vswnl and no c/o pain. Wife and son at bedside. Bed in low position and callbell within reach. accu ck at 61ur=275. ble with reddness and mottling secondary to venous stasis.  Wound care consulted.  ble =3 edema. Telemetry maintained nsr. Pt had egd,paracentesis(3.4liters off) and ct abd done. Pt received 2 units blood on dayshift also for  H&h=5.5 and 16.1. Ascites and scrotal edema noted. Pt ambulates with stand by assist. Standard precautions maintained.

## 2018-01-23 NOTE — PLAN OF CARE
Problem: Patient Care Overview  Goal: Plan of Care Review  Outcome: Ongoing (interventions implemented as appropriate)  Patient AAO today, receiving lactulose, 4 x bms noted.  Free of injury of falls at this time, patient is a one person assist with ambulation and has agreed to call for assistance with ambulation.  Paients wife and son at the bedside attentive to patient involved in patients care. Vanc dcd due to elevated vanc trough.BG range 160-171.  H/H stable today but remains low after receiving 2 units of PRBCs yesterday.  Currently on full liquid diet will be clear liquid diet and possible scope on Thursday.  Started on oral cipro today.SR per tele.

## 2018-01-23 NOTE — PROGRESS NOTES
[facility]  Hepatology  Progress Note    Patient Name: Alon Adamson  MRN: [id number]  Admission Date: 1/20/2018  Hospital Length of Stay: 1 days  Attending Provider: Darin Mccarthy MD   Primary Care Physician: Mckinley Vides MD  Principal Problem:Decompensated hepatic cirrhosis    Subjective:     Transplant status: Pre-transplant    HPI: This is a 63 y/o male with hx of etoh cirrhosis complicated by LE edema, hepatic encephalopathy (followed by Dr. García, splenic artery aneurysm s/p coiling in 2016, deferred for transplant eval currently due to concern for CAD pending dobutamine stress test),  who presents with scrotal and abdominal swelling x 1 week. Assoc with SOB and AYALA. He has noted some dark stools intermittently. Has had worsening redness and weeping over his bilatearl shins.   Initially presented to [facility] and was directed to [facility] for further care.   Admit to taking his lasix 40mg BID and aldactone 150mg bid as well    Interval History:   Worsening anemia. No melena. Underwent EGD - no findings to explain of sudden drop in HnH which was unrevealing.  Undergoing IR paracentesis    Current Facility-Administered Medications   Medication    0.9%  NaCl infusion (for blood administration)    atorvastatin tablet 40 mg    ciprofloxacin (CIPRO)400mg/200ml D5W IVPB 400 mg    dextrose 50% injection 12.5 g    dextrose 50% injection 25 g    docusate sodium capsule 250 mg    ferrous sulfate EC tablet 325 mg    glucagon (human recombinant) injection 1 mg    glucose chewable tablet 16 g    glucose chewable tablet 24 g    insulin aspart pen 0-5 Units    lactulose 20 gram/30 mL solution Soln 60 g    levETIRAcetam tablet 500 mg    magnesium oxide tablet 400 mg    octreotide injection 100 mcg    ondansetron injection 4 mg    pantoprazole EC tablet 40 mg    rifAXIMin tablet 550 mg    sodium chloride 0.9% flush 5 mL    sodium chloride 0.9% flush 5 mL    vancomycin (VANCOCIN) 1,500 mg  in sodium chloride 0.9% 250 mL IVPB       Objective:     Vital Signs (Most Recent):  Temp: 98.6 °F (37 °C) (01/22/18 1545)  Pulse: (!) 59 (01/22/18 1634)  Resp: 18 (01/22/18 1634)  BP: (!) 113/56 (01/22/18 1634)  SpO2: 98 % (01/22/18 1634) Vital Signs (24h Range):  Temp:  [98 °F (36.7 °C)-99 °F (37.2 °C)] 98.6 °F (37 °C)  Pulse:  [58-73] 59  Resp:  [16-20] 18  SpO2:  [95 %-100 %] 98 %  BP: (102-146)/() 113/56     Weight: (!) 146 kg (321 lb 14 oz) (01/22/18 0358)  Body mass index is 41.33 kg/m².    Physical Exam   Constitutional: He is oriented to person, place, and time. He appears ill.   Eyes: No scleral icterus.   Cardiovascular: Normal rate.  Exam reveals no friction rub.    Pulmonary/Chest: Effort normal. No respiratory distress.   Abdominal: Soft. Bowel sounds are normal. He exhibits distension. There is no tenderness. There is no rebound and no guarding.   Musculoskeletal: He exhibits edema.   Neurological: He is alert and oriented to person, place, and time.       MELD-Na score: 21 at 1/22/2018  5:59 AM  MELD score: 20 at 1/22/2018  5:59 AM  Calculated from:  Serum Creatinine: 1.8 mg/dL at 1/22/2018  5:59 AM  Serum Sodium: 135 mmol/L at 1/22/2018  5:59 AM  Total Bilirubin: 1.6 mg/dL at 1/22/2018  5:59 AM  INR(ratio): 1.7 at 1/22/2018  5:59 AM  Age: 62 years    Lab Results   Component Value Date    WBC 1.51 (LL) 01/22/2018    HGB 5.6 (LL) 01/22/2018    HCT 16.4 (LL) 01/22/2018     (H) 01/22/2018    PLT 33 (LL) 01/22/2018     Lab Results   Component Value Date    INR 1.7 (H) 01/22/2018     Lab Results   Component Value Date     (L) 01/22/2018    K 4.4 01/22/2018    CREATININE 1.8 (H) 01/22/2018     Lab Results   Component Value Date    ALBUMIN 2.3 (L) 01/22/2018    ALT 22 01/22/2018    AST 40 01/22/2018    GGT 95 (H) 10/29/2015    ALKPHOS 64 01/22/2018    BILITOT 1.6 (H) 01/22/2018     Lab Results   Component Value Date    AFP 5.3 04/21/2017     Lab Results   Component Value Date    LIPASE  190 (H) 01/20/2018     No results found for: TACROLIMUS    Imaging:  Reviewed and as noted in HPI.         Assessment/Plan:     Cirrhosis of liver without ascites    Hx of Etoh cirrhosis undergoing transplant eval who is admitted with volume overload on 1/20/2018.   Currently being treated for with SOB , LE edema, suspected cellulitis  Some concern on labs including anemia from baseline and VICKY.   Needs dobutamine stress to complete eval    Recs:  Pending PETH  Consider CT abdomen/pelvis to evaluate anemia, if negative, we will consider EGD+colonoscopy.   Transfuse as needed  Lactulose and rifaximin  Wound care  IV Abx with vancomycin for cellulitis  Social work consult for transplant eval  Consider dobutamine stress testing while here, pending social work eval.        VICKY (acute kidney injury)    As above        Anemia of chronic disease    As above            Thank you for your consult. I will follow-up with patient. Please contact us if you have any additional questions.    Adriano Medina MD  Hepatology  [facility]

## 2018-01-23 NOTE — PROGRESS NOTES
Transplant Recipient Adult Psychosocial Assessment-Update  *Pt inpatient during evaluation    Pt's wife Vin and son London were present.    Alon Adamson   [facility] [place] [address]    Telephone Information:   Mobile [phone]   Home  [phone] (home)  Work  [phone] (work)  E-mail  [email or web address]    Sex: male  YOB: 1955  Age: 62 y.o.    Encounter Date: 2018  U[place] Citizen: yes  Primary Language: English   Needed: no    Emergency Contact:  Name: Vin Adamson  Relationship: wife  Address: Same as Patient  Phone Numbers:  [phone] (mobile)    Family/Social Support:   Number of dependents/: Pt has a 15 y/o son, London, who will be cared for by wife's mother or wife's sister. Son has been extremely helpful in caring for the pt and reports that he enjoys being there for his father.   Marital history: Pt and wife have been  for 30 years. This is pt's only marriage.   Other family dynamics: Pt's father is , mother Dary Curry is 85 and physically active and involved in pt's life. Mother lives in [place] but plans on taking train to [facility] when pt gets transplant. Pt's mother is helpful in caring for pt's son as well.     Household Composition:  Name: Alon Adamson  Age: 62  Relationship: patient  Does person drive? yes; prior to admit    Name: Vin Adamson  Age: 54  Relationship: wife  Does person drive? yes    Name: London Adamson  Age: 14  Relationship: son  Does person drive? no    Do you and your caregivers have access to reliable transportation? yes  PRIMARY CAREGIVER: Vin Adamson, pt's wife, will be primary caregiver, phone number [phone].      provided in-depth information to patient and caregiver regarding pre- and post-transplant caregiver role.   strongly encourages patient and caregiver to have concrete plan regarding post-transplant care giving, including back-up caregiver(s) to ensure care giving  needs are met as needed.    Patient and Caregiver states understanding all aspects of caregiver role/commitment and is able/willing/committed to being caregiver to the fullest extent necessary.    Patient and Caregiver verbalizes understanding of the education provided today and caregiver responsibilities.         remains available. Patient and Caregiver agree to contact  in a timely manner if concerns arise.      Able to take time off work without financial concerns: yes. 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 Pt's wife changed jobs in Aug 2017, and current job has more flexibility than previous job.     Additional Significant Others who will Assist with Transplant:  Name: Padmini Chavarria ([phone])  Age: 58  City: [place]: [place]  Relationship: Sister-In-Law  Does person drive? yes    Name: Lane Julissa ([phone])  Age: 52  City: [place]: [place]  Relationship: Brother-In-Law  Does person drive? Yes    Name: London Kidd ([phone])   *Son helpful with providing physical and emotional support but will not be with pt alone.   Age: 14  City: [place]: [place]  Relationship: Son  Does person drive? No    Name: Dary Curry  *Will provide emotional support to pt post-transplant   Age: 85  City: [place] State: [place]  Relationship: Mother  Does person drive? yes    Living Will: no  Healthcare Power of : no  Advance Directives on file: <<no information> per medical record.  Verbally reviewed LW/HCPA information.   provided patient with copy of LW/HCPA documents and provided education on completion of forms.    Living Donors: No.    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 Pt reports that he can hear fine in his left ear. Pt reports that he has had small episodes of  confusion with last major episode of encephalopathy being in Aug 2016.   Learns Best By:  Written and Verbal Information     Status: yes: [employer], date: 5506-0940; Pt reports that he traveled to [place], [facility], [place] and other  Countries  VA Benefits: no; Pt has never utilized    Working for Income: No  If no, reason not working: Patient Choice - Retired  Patient retired in 2016 and prior to care home worked as an  for the [place] [employer] since . Pt was in charge of overseeing highway construction/expansion. Pt reports that he retired after becoming sick with ESLD.     Spouse/Significant Other Employment: Pt's wife is an LPN at a [employer] run by Dr. Scott Beard. Pt's wife started working at this practice since Aug 2017. Wife reports that she has known Dr. Beard since childhood and is afforded a lot of flexibility. Prior to this, pt's wife worked as an LPN for [employer], but the NP who ran the business moved and closed it down.     Disabled: yes: date disability began: 2017, due to: ESLD.    Monthly Income:  State Pension: $456.00  SS Disability: $1500.00  Wife's Income: $2,000.00 *Wife does not get paid when she is not at work    Able to afford all costs now and if transplanted, including medications: yes  Patient and Caregiver verbalizes understanding of personal responsibilities related to transplant costs and the importance of having a financial plan to ensure that patients transplant costs are fully covered.      provided fundraising information/education.  Patient and Caregiver verbalizes understanding.   remains available.    Insurance:   Payor/Plan Subscr  Sex Relation Sub. Ins. ID Effective Group Num   1. BLUE CROSS BL* IVETTE SILVERIO EUG* 1955 Male  [id number]* [id number]                                   [address] [address]     Primary Insurance (for UNOS reporting): Private Insurance; BCBS  Secondary  Insurance (for UNOS reporting): None  Patient and Caregiver verbalizes clear understanding that patient may experience difficulty obtaining and/or be denied insurance coverage post-surgery. This includes and is not limited to disability insurance, life insurance, health insurance, burial insurance, long term care insurance, and other insurances.    Patient and Caregiver also reports understanding that future health concerns related to or unrelated to transplantation may not be covered by patient's insurance.  Resources and information provided and reviewed.      Patient and Caregiver provides verbal permission to release any necessary information to outside resources for patient care and discharge planning.  Resources and information provided are reviewed.      Dialysis: none  Infusion Service: patient utilizing? no  Home Health: patient utilizing? no  DME: yes; Pt has a CPAP but did a sleep study a second time, 2.5 years ago, and does not need to wear.  Pulmonary/Cardiac Rehab: none   ADLS:  Pt reports that prior to hospitalization, he was fully independent and driving.     Adherence: Pt and family report that pt has been compliant with doctor's appointments and medication regimen.  Adherence education and counseling provided.     Per History Section:  Past Medical History:   Diagnosis Date    Anticoagulant long-term use     Arthritis     Back pain     Cellulitis     Cirrhosis     Coronary artery disease     DM (diabetes mellitus)     Hearing loss     right ear    Hepatic encephalopathy     Hypertension     diagnosed today (11/25/2015) and prescribed toprol    Leg edema     Nephrolithiasis     JACK (obstructive sleep apnea)     Seizures     Splenomegaly      Social History   Substance Use Topics    Smoking status: Never Smoker    Smokeless tobacco: Current User     Types: Chew    Alcohol use No     History   Drug Use No     History   Sexual Activity    Sexual activity: Not on file       Per  Today's Psychosocial:  Tobacco: Pt chews a very small amt of tobacco, equaling to one pack of tobacco every two weeks. Pt began weaning down from one bag ever three days about a year ago. Pt reports he began using dip in the [employer], quit when he started working and began using chewing tobacco around 1988. Pt reports that he is motivated to remain abstinent from all tobacco upon discharge from the hospital . SW explained the risks of tobacco use, including increased risk for cancer and infection.   Alcohol: none, patient denies any use. Pt quit drinking ETOH in 1988 when he left the [employer]. Prior to this pt endorses periods of time while in the [employer] when he used to drink heavily. Prior to the [place], pt reports that he drank a lot of beer.   Illicit Drugs/Non-prescribed Medications: none, patient denies any use. Pt reports marijuana use in the 1970's.     Patient and Caregiver states clear understanding of the potential impact of substance use as it relates to transplant candidacy and is aware of possible random substance screening.  Substance abstinence/cessation counseling, education and resources provided and reviewed.     Arrests/DWI/Treatment/Rehab: patient denies    Psychiatric History:    Mental Health: depression related to illness and thoughts about how the future will be for his son if he dies. Pt reports that he has been managing his symptoms with family support and does not need additional help at this time.   Psychiatrist/Counselor: none  Medications:  none  Suicide/Homicide Issues: Pt denies current or past SI/HI.    Safety at home: Pt and family deny current or past safety issues in the home.     Knowledge: Patient and Caregiver states having clear understanding and realistic expectations regarding the potential risks and potential benefits of organ transplantation and organ donation, agrees to discuss with health care team members and support system members and to utilize available resources and express  questions and/or concerns in order to further facilitate the pt informed decision-making.  Resources and information provided and reviewed.     Patient and Caregiver is aware of [facility] affiliation and/or partnership with agencies in home health care, LTAC, SNF, [facility], and other hospitals and clinics.    Understanding: Patient and Caregiver reports having a clear understanding of the many lifetime commitments involved with being a transplant recipient, including costs, compliance, medications, lab work, procedures, appointments, concrete and financial planning, preparedness, timely and appropriate communication of concerns, abstinence (ETOH, tobacco, illicit non-prescribed drugs), adherence to all health care team recommendations, support system and caregiver involvement, appropriate and timely resource utilization and follow-through, mental health counseling as needed/recommended, and patient and caregiver responsibilities.  Social Service Handbook, resources and detailed educational information provided and reviewed.  Educational information provided.    Patient and Caregiver also reports current and expected compliance with health care regime and states having a clear understanding of the importance of compliance.      Patient and Caregiver reports a clear understanding that risks and benefits may be involved with organ transplantation and with organ donation.      Patient and Caregiver also reports clear understanding that psychosocial risk factors may affect patient, and include but are not limited to feelings of depression, generalized anxiety, anxiety regarding dependence on others, post traumatic stress disorder, feelings of guilt and other emotional and/or mental concerns, and/or exacerbation of existing mental health concerns.  Detailed resources provided and discussed.     Patient and Caregiver agrees to access appropriate resources in a timely manner as needed and/or as recommended, and to communicate  concerns appropriately.  Patient and Caregiver also reports a clear understanding of treatment options available.      reviewed education, provided additional information, and answered questions.    Feelings or Concerns: Pt reports that he is mostly worried about what will happen to his son if he dies.     Coping: Pt reports that when he is not sleeping, he enjoys watching Westerns and spending time with his son in the garden. Pt reports that he hasn't been able to attend [employer] since health has declined, but is Spiritual.     Goals: Pt reports that he would like to see his son graduate highschool and college. Pt expressed that he would like his son to go into a line of work that makes him happy.     Interview Behavior: Patient presents as alert and oriented x4, pleasant, good eye contact, recall good, average intelligence, calm, communicative, cooperative, and asking/answering questions appropriately. Caregiver presents as alert and oriented x 4, pleasant, good eye contact, well groomed, recall good, concentration/judgement good, average intelligence, calm, communicative, cooperative and asking and answering questions appropriately.          Transplant Social Work - Candidacy  Assessment/Plan:     Psychosocial Suitability: Patient presents as a suitable candidate for liver transplant at this time. Based on psychosocial risk factors, patient presents as low risk, due to concrete and supportive caregiver plan, adequate health insurance, financial stability, and no reported mental health or substance abuse issues. Pt is interested in maintaining abstinence from chewing tobacco .    Recommendations/Additional Comments:   Local Lodging  Fundraising  Cessation from Chewing Tobacco    Suzie Vasquez LCSW

## 2018-01-23 NOTE — PLAN OF CARE
Problem: Occupational Therapy Goal  Goal: Occupational Therapy Goal  Goals to be met by: 2/6/18     Patient will increase functional independence with ADLs by performing:    UE Dressing with Modified [place].  LE Dressing with Set-up Assistance.  Grooming while seated at sink with Supervision.  Toileting from toilet with Supervision for hygiene and clothing management.   Toilet transfer to toilet with Supervision.    Outcome: Ongoing (interventions implemented as appropriate)  Initial eval completed   POC implemented and goals established      Abbey Barnard OT  1/23/2018

## 2018-01-23 NOTE — PT/OT/SLP EVAL
"Physical Therapy Evaluation    Patient Name:  Alon Adamson   MRN:  [id number]    Recommendations:     Discharge Recommendations:  home health PT   Discharge Equipment Recommendations:  (TBD)   Barriers to discharge: Inaccessible home (stairs to enter)    Assessment:     Alon Adamson is a 62 y.o. male admitted with a medical diagnosis of Decompensated hepatic cirrhosis.  He presents with the following impairments/functional limitations:  weakness, impaired functional mobilty, edema, impaired skin, impaired self care skills, impaired balance, gait instability, impaired endurance, decreased safety awareness, decreased coordination, impaired cardiopulmonary response to activity. Pt able to perform functional mobility without physical assist or use of DME. However, required CGA throughout gait with noted instability, partially due to edema. Pt also required increased time to complete transfers with slowed movements and increased forward flexion noted. Pt would benefit from skilled acute PT in order to address current deficits and progress functional mobility.     Rehab Prognosis:  good; patient would benefit from acute skilled PT services to address these deficits and reach maximum level of function.      Recent Surgery: Procedure(s) (LRB):  ESOPHAGOGASTRODUODENOSCOPY (EGD) (N/A) 1 Day Post-Op    Plan:     During this hospitalization, patient to be seen 3 x/week to address the above listed problems via gait training, therapeutic activities, therapeutic exercises  · Plan of Care Expires:  02/21/18   Plan of Care Reviewed with: patient    Subjective     Communicated with RN prior to session.  Patient found seated EOB upon PT entry to room, agreeable to evaluation.      Chief Complaint: none noted   Patient comments/goals: "I gotta watch my feet going in the house, make sure I don't step on a cat. I have 16."   Pain/Comfort:  · Pain Rating 1: 0/10    Patients cultural, spiritual, Methodist conflicts given the " current situation: none noted     Living Environment:  Pt lives with his wife and son in a mobile home; 5 [address] back door and 6 [address] front door with R handrail at both sets of stairs. Pt reports that PTA he was mostly (I) with ambulation, occasionally requiring SPC. Pt requiring assist with dressing PTA.   Prior to admission, patients level of function was mod-I with ambulation, requiring assist with ADLs.  Patient has the following equipment: cane, straight.  DME owned (not currently used): none.  Upon discharge, patient will have assistance from wife and son.    Objective:     Patient found with: telemetry     General Precautions: Standard, fall   Orthopedic Precautions:N/A   Braces: N/A     Exams:  · Cognitive Exam:  Patient is oriented to Person, Place, Time and Situation and follows two-step commands   · Postural Exam:  Patient presented with the following abnormalities:    · -       Rounded shoulders  · -       Forward head  · Skin Integrity/Edema:      · -       Skin integrity: redness noted BLE 2* venous stasis  · -       Edema: Moderate BLE  · RLE ROM: WFL  · RLE Strength: WFL  · LLE ROM: WFL  · LLE Strength: WFL    Functional Mobility:  · Transfers:     · Sit to Stand:  stand by assistance with no AD  · Toilet Transfer: contact guard assistance with  no AD  using  Stand Pivot  · Gait: 80 ft. with CGA with no AD  · Decreased antoine, decreased step length, and impaired weight-shifting ability. Gait instability, likely 2* edema    AM-PAC 6 CLICK MOBILITY  Total Score:17       Therapeutic Activities and Exercises:   Pt educated on role of PT and PT POC. Pt verbalized understanding.   Pt instructed to have staff member assist for all transfers and ambulation. Pt v/u. Pt left on toilet with PCT present, who agreed to assist pt off of toilet. RN also notified.   Pt educated on the effects of bed rest and the importance of OOB activity. Pt encouraged to sit UIC for majority of day as tolerated. Pillows placed in  seat of chair 2* pt reporting that he has difficulty standing from low surfaces.     Patient left seated on toilet with all lines intact, call button in reach, RN notified and PCT, pt's wife, and pt's son present.     GOALS:    Physical Therapy Goals        Problem: Physical Therapy Goal    Goal Priority Disciplines Outcome Goal Variances Interventions   Physical Therapy Goal     PT/OT, PT Ongoing (interventions implemented as appropriate)     Description:  Goals to be met by: 18     Patient will increase functional independence with mobility by performin. Supine to sit with Supervision   2. Sit to stand transfer with Supervision  3. Gait  x 150 feet with Supervision.   4. Ascend/descend 5 stair with right Handrails Stand-by Assistance.   5. Lower extremity exercise program x15 reps, with supervision, in order to increase LE strength and (I) with functional mobility                       History:     Past Medical History:   Diagnosis Date    Anticoagulant long-term use     Arthritis     Back pain     Cellulitis     Cirrhosis     Coronary artery disease     DM (diabetes mellitus)     Hearing loss     right ear    Hepatic encephalopathy     Hypertension     diagnosed today (2015) and prescribed toprol    Leg edema     Nephrolithiasis     JACK (obstructive sleep apnea)     Seizures     Splenomegaly        Past Surgical History:   Procedure Laterality Date    APPENDECTOMY      Open    BACK SURGERY      CHOLECYSTECTOMY      laprascopic    LUMBAR DISCECTOMY      SPLENIC ARTERY EMBOLIZATION  2016    Dr Taveras    TONSILLECTOMY         Clinical Decision Making:     History  Co-morbidities and personal factors that may impact the plan of care Examination  Body Structures and Functions, activity limitations and participation restrictions that may impact the plan of care Clinical Presentation   Decision Making/ Complexity Score   Co-morbidities:   [] Time since onset of injury /  illness / exacerbation  [] Status of current condition  [x]Patient's cognitive status and safety concerns    [x] Multiple Medical Problems (see med hx)  Personal Factors:   [] Patient's age  [] Prior Level of function   [] Patient's home situation (environment and family support)  [] Patient's level of motivation  [] Expected progression of patient      HISTORY:(criteria)    [] 38669 - no personal factors/history    [x] 00039 - has 1-2 personal factor/comorbidity     [] 48693 - has >3 personal factor/comorbidity     Body Regions:  [] Objective examination findings  [] Head     []  Neck  [] Trunk   [] Upper Extremity  [] Lower Extremity    Body Systems:  [] For communication ability, affect, cognition, language, and learning style: the assessment of the ability to make needs known, consciousness, orientation (person, place, and time), expected emotional /behavioral responses, and learning preferences (eg, learning barriers, education  needs)  [x] For the neuromuscular system: a general assessment of gross coordinated movement (eg, balance, gait, locomotion, transfers, and transitions) and motor function  (motor control and motor learning)  [x] For the musculoskeletal system: the assessment of gross symmetry, gross range of motion, gross strength, height, and weight  [x] For the integumentary system: the assessment of pliability(texture), presence of scar formation, skin color, and skin integrity  [x] For cardiovascular/pulmonary system: the assessment of heart rate, respiratory rate, blood pressure, and edema     Activity limitations:    [] Patient's cognitive status and saf ety concerns          [] Status of current condition      [] Weight bearing restriction  [] Cardiopulmunary Restriction    Participation Restrictions:   [] Goals and goal agreement with the patient     [] Rehab potential (prognosis) and probable outcome      Examination of Body System: (criteria)    [] 07473 - addressing 1-2 elements    [] 20272  - addressing a total of 3 or more elements     [x] 39417 -  Addressing a total of 4 or more elements         Clinical Presentation: (criteria)  Stable - 59071     On examination of body system using standardized tests and measures patient presents with 4 or more elements from any of the following: body structures and functions, activity limitations, and/or participation restrictions.  Leading to a clinical presentation that is considered stable and/or uncomplicated                              Clinical Decision Making  (Eval Complexity):  Low- 56343     Time Tracking:     PT Received On: 01/23/18  PT Start Time: 1118     PT Stop Time: 1136  PT Total Time (min): 18 min     Billable Minutes: Evaluation 18  (co-eval with OT)    Lucille Craig, PT, DPT   1/23/2018  [phone]

## 2018-01-24 ENCOUNTER — ANESTHESIA EVENT (OUTPATIENT)
Dept: ENDOSCOPY | Facility: HOSPITAL | Age: 63
DRG: 433 | End: 2018-01-24
Payer: COMMERCIAL

## 2018-01-24 LAB
ALBUMIN SERPL BCP-MCNC: 2.2 G/DL
ALP SERPL-CCNC: 67 U/L
ALT SERPL W/O P-5'-P-CCNC: 24 U/L
ANION GAP SERPL CALC-SCNC: 4 MMOL/L
ANISOCYTOSIS BLD QL SMEAR: SLIGHT
ANISOCYTOSIS BLD QL SMEAR: SLIGHT
AST SERPL-CCNC: 42 U/L
BASOPHILS # BLD AUTO: 0.01 K/UL
BASOPHILS # BLD AUTO: 0.02 K/UL
BASOPHILS # BLD AUTO: 0.02 K/UL
BASOPHILS NFR BLD: 0.6 %
BASOPHILS NFR BLD: 1.1 %
BASOPHILS NFR BLD: 1.1 %
BILIRUB SERPL-MCNC: 1.7 MG/DL
BUN SERPL-MCNC: 16 MG/DL
CALCIUM SERPL-MCNC: 8.1 MG/DL
CHLORIDE SERPL-SCNC: 115 MMOL/L
CO2 SERPL-SCNC: 19 MMOL/L
CREAT SERPL-MCNC: 1.9 MG/DL
DIFFERENTIAL METHOD: ABNORMAL
EOSINOPHIL # BLD AUTO: 0.1 K/UL
EOSINOPHIL NFR BLD: 3 %
EOSINOPHIL NFR BLD: 3.2 %
EOSINOPHIL NFR BLD: 3.4 %
ERYTHROCYTE [DISTWIDTH] IN BLOOD BY AUTOMATED COUNT: 15.3 %
ERYTHROCYTE [DISTWIDTH] IN BLOOD BY AUTOMATED COUNT: 15.3 %
ERYTHROCYTE [DISTWIDTH] IN BLOOD BY AUTOMATED COUNT: 15.5 %
EST. GFR  (AFRICAN AMERICAN): 43 ML/MIN/1.73 M^2
EST. GFR  (NON AFRICAN AMERICAN): 37.2 ML/MIN/1.73 M^2
GLUCOSE SERPL-MCNC: 132 MG/DL
HCT VFR BLD AUTO: 21.4 %
HCT VFR BLD AUTO: 22.6 %
HCT VFR BLD AUTO: 23.6 %
HGB BLD-MCNC: 7.3 G/DL
HGB BLD-MCNC: 7.7 G/DL
HGB BLD-MCNC: 7.9 G/DL
IMM GRANULOCYTES # BLD AUTO: 0 K/UL
IMM GRANULOCYTES # BLD AUTO: 0.01 K/UL
IMM GRANULOCYTES # BLD AUTO: 0.01 K/UL
IMM GRANULOCYTES NFR BLD AUTO: 0 %
IMM GRANULOCYTES NFR BLD AUTO: 0.5 %
IMM GRANULOCYTES NFR BLD AUTO: 0.6 %
INR PPP: 1.6
LYMPHOCYTES # BLD AUTO: 0.2 K/UL
LYMPHOCYTES NFR BLD: 12.9 %
LYMPHOCYTES NFR BLD: 13.6 %
LYMPHOCYTES NFR BLD: 14.6 %
MAGNESIUM SERPL-MCNC: 2.3 MG/DL
MCH RBC QN AUTO: 33.9 PG
MCH RBC QN AUTO: 34 PG
MCH RBC QN AUTO: 34.1 PG
MCHC RBC AUTO-ENTMCNC: 33.5 G/DL
MCHC RBC AUTO-ENTMCNC: 34.1 G/DL
MCHC RBC AUTO-ENTMCNC: 34.1 G/DL
MCV RBC AUTO: 100 FL
MCV RBC AUTO: 100 FL
MCV RBC AUTO: 102 FL
MONOCYTES # BLD AUTO: 0.4 K/UL
MONOCYTES NFR BLD: 21 %
MONOCYTES NFR BLD: 23.1 %
MONOCYTES NFR BLD: 24.4 %
NEUTROPHILS # BLD AUTO: 0.9 K/UL
NEUTROPHILS # BLD AUTO: 1.1 K/UL
NEUTROPHILS # BLD AUTO: 1.1 K/UL
NEUTROPHILS NFR BLD: 57.4 %
NEUTROPHILS NFR BLD: 59.2 %
NEUTROPHILS NFR BLD: 60.3 %
NRBC BLD-RTO: 0 /100 WBC
OVALOCYTES BLD QL SMEAR: ABNORMAL
PLATELET # BLD AUTO: 30 K/UL
PLATELET # BLD AUTO: 30 K/UL
PLATELET # BLD AUTO: 31 K/UL
PLATELET BLD QL SMEAR: ABNORMAL
PLATELET BLD QL SMEAR: ABNORMAL
PMV BLD AUTO: 11.7 FL
PMV BLD AUTO: 12.4 FL
PMV BLD AUTO: 12.5 FL
POCT GLUCOSE: 136 MG/DL (ref 70–110)
POCT GLUCOSE: 137 MG/DL (ref 70–110)
POCT GLUCOSE: 137 MG/DL (ref 70–110)
POCT GLUCOSE: 138 MG/DL (ref 70–110)
POIKILOCYTOSIS BLD QL SMEAR: SLIGHT
POLYCHROMASIA BLD QL SMEAR: ABNORMAL
POTASSIUM SERPL-SCNC: 4.4 MMOL/L
PROT SERPL-MCNC: 4.4 G/DL
PROTHROMBIN TIME: 15.7 SEC
RBC # BLD AUTO: 2.15 M/UL
RBC # BLD AUTO: 2.27 M/UL
RBC # BLD AUTO: 2.32 M/UL
SODIUM SERPL-SCNC: 138 MMOL/L
VANCOMYCIN SERPL-MCNC: 22.2 UG/ML
WBC # BLD AUTO: 1.64 K/UL
WBC # BLD AUTO: 1.76 K/UL
WBC # BLD AUTO: 1.86 K/UL

## 2018-01-24 PROCEDURE — 63600175 PHARM REV CODE 636 W HCPCS: Mod: NTX | Performed by: NURSE PRACTITIONER

## 2018-01-24 PROCEDURE — 20600001 HC STEP DOWN PRIVATE ROOM: Mod: NTX

## 2018-01-24 PROCEDURE — 83735 ASSAY OF MAGNESIUM: CPT | Mod: NTX

## 2018-01-24 PROCEDURE — 81001 URINALYSIS AUTO W/SCOPE: CPT | Mod: NTX

## 2018-01-24 PROCEDURE — 85610 PROTHROMBIN TIME: CPT | Mod: NTX

## 2018-01-24 PROCEDURE — 25000003 PHARM REV CODE 250: Mod: NTX | Performed by: HOSPITALIST

## 2018-01-24 PROCEDURE — 80053 COMPREHEN METABOLIC PANEL: CPT | Mod: NTX

## 2018-01-24 PROCEDURE — 25000003 PHARM REV CODE 250: Mod: NTX | Performed by: INTERNAL MEDICINE

## 2018-01-24 PROCEDURE — 99233 SBSQ HOSP IP/OBS HIGH 50: CPT | Mod: NTX,,, | Performed by: HOSPITALIST

## 2018-01-24 PROCEDURE — 36415 COLL VENOUS BLD VENIPUNCTURE: CPT | Mod: NTX

## 2018-01-24 PROCEDURE — 85025 COMPLETE CBC W/AUTO DIFF WBC: CPT | Mod: 91,NTX

## 2018-01-24 PROCEDURE — 87086 URINE CULTURE/COLONY COUNT: CPT | Mod: NTX

## 2018-01-24 PROCEDURE — 80202 ASSAY OF VANCOMYCIN: CPT | Mod: NTX

## 2018-01-24 RX ORDER — POLYETHYLENE GLYCOL 3350, SODIUM SULFATE ANHYDROUS, SODIUM BICARBONATE, SODIUM CHLORIDE, POTASSIUM CHLORIDE 236; 22.74; 6.74; 5.86; 2.97 G/4L; G/4L; G/4L; G/4L; G/4L
2000 POWDER, FOR SOLUTION ORAL ONCE
Status: COMPLETED | OUTPATIENT
Start: 2018-01-25 | End: 2018-01-25

## 2018-01-24 RX ORDER — POLYETHYLENE GLYCOL 3350, SODIUM SULFATE ANHYDROUS, SODIUM BICARBONATE, SODIUM CHLORIDE, POTASSIUM CHLORIDE 236; 22.74; 6.74; 5.86; 2.97 G/4L; G/4L; G/4L; G/4L; G/4L
2000 POWDER, FOR SOLUTION ORAL ONCE
Status: COMPLETED | OUTPATIENT
Start: 2018-01-24 | End: 2018-01-24

## 2018-01-24 RX ORDER — LACTULOSE 10 G/15ML
30 SOLUTION ORAL 3 TIMES DAILY
Status: DISCONTINUED | OUTPATIENT
Start: 2018-01-24 | End: 2018-01-27 | Stop reason: HOSPADM

## 2018-01-24 RX ADMIN — ATORVASTATIN CALCIUM 40 MG: 20 TABLET, FILM COATED ORAL at 09:01

## 2018-01-24 RX ADMIN — PANTOPRAZOLE SODIUM 40 MG: 40 TABLET, DELAYED RELEASE ORAL at 06:01

## 2018-01-24 RX ADMIN — CIPROFLOXACIN HYDROCHLORIDE 500 MG: 500 TABLET, FILM COATED ORAL at 09:01

## 2018-01-24 RX ADMIN — MAGNESIUM OXIDE TAB 400 MG (241.3 MG ELEMENTAL MG) 400 MG: 400 (241.3 MG) TAB at 09:01

## 2018-01-24 RX ADMIN — LEVETIRACETAM 500 MG: 500 TABLET ORAL at 09:01

## 2018-01-24 RX ADMIN — DOCUSATE SODIUM 250 MG: 50 CAPSULE, LIQUID FILLED ORAL at 09:01

## 2018-01-24 RX ADMIN — FERROUS SULFATE TAB EC 325 MG (65 MG FE EQUIVALENT) 325 MG: 325 (65 FE) TABLET DELAYED RESPONSE at 09:01

## 2018-01-24 RX ADMIN — POLYETHYLENE GLYCOL 3350, SODIUM SULFATE ANHYDROUS, SODIUM BICARBONATE, SODIUM CHLORIDE, POTASSIUM CHLORIDE 2000 ML: 236; 22.74; 6.74; 5.86; 2.97 POWDER, FOR SOLUTION ORAL at 06:01

## 2018-01-24 RX ADMIN — LACTULOSE 60 G: 20 SOLUTION ORAL at 06:01

## 2018-01-24 RX ADMIN — ONDANSETRON 4 MG: 2 INJECTION INTRAMUSCULAR; INTRAVENOUS at 11:01

## 2018-01-24 RX ADMIN — RIFAXIMIN 550 MG: 550 TABLET ORAL at 09:01

## 2018-01-24 RX ADMIN — PANTOPRAZOLE SODIUM 40 MG: 40 TABLET, DELAYED RELEASE ORAL at 04:01

## 2018-01-24 RX ADMIN — LEVETIRACETAM 500 MG: 500 TABLET ORAL at 07:01

## 2018-01-24 RX ADMIN — RIFAXIMIN 550 MG: 550 TABLET ORAL at 07:01

## 2018-01-24 NOTE — PROGRESS NOTES
"[facility] Hospital Medicine  Progress Note    Patient Name: Alon Adamson  MRN: [id number]  Patient Class: IP- Inpatient   Admission Date: 1/20/2018  Length of Stay: 3 days  Expected Discharge Date: 1/30/2018  Attending Physician: Abdi Lowry MD  Primary Care Provider: Mckinley Vides MD  [facility] Medicine Team: [facility]  Abdi Lowry MD  Principal Problem:Decompensated hepatic cirrhosis    Subjective:     HPI:   62M with cirrhosis presents with recent worsening of abdominal distension and lower extremity edema over the past 1-2 weeks.  Previously his diuretic regimen of lasix 40mg bid and spironolactone 150mg bid was effective in keeping excess fluid off, and he has never been this edematous before.  Other than this he denies any other complaints.  Upon more targeted questioning he admits that about 2 weeks ago his venous stasis ulcers on his shins were more warm and erythematous, and he had some drainage from wounds on his right shin.  He thinks he may have had some fevers then, but those have since resolved and the redness is improved.  His son has been bandaging the wounds and applying silvadene to them.  He does admit to being a bit more fatigued lately.  Appetite has been non-existent for "years" but he makes himself eat regularly.  He denies any hypoglycemic episodes.  He has nearly completed transplant workup which was deferred in 12/2016 for a positive nuclear stress test, for which he underwent PCI and stenting in 2/2017.  Re-presentation to selection committee is pending follow-up repeat stress test which has yet to be completed due to scheduling conflicts on account of family issues.  He has not experienced any cardiovascular symptoms.      Hospital Course:  61 yo male with alcoholic liver cirrhosis who came in on sunday with AYALA and new abdominal scrotum swelling; patient was being worked up as an outpatient, was found to have a coronary lesion that was stented last " year with bare metal stent and completed plavix; was suppose to get dobutamine stress test to re-evaluate, however has missed several appointments, so may need to get it done on this admission; Patient has B/L LE cellulitis and statsis ulcer, improving on Vanc; found to have a drop in his hemoglobin from 10.6 one month ago to 8 yesterday; stated having black stools several weeks back, but none recently; I started him on protonix and octreotide anyways, this morning his hemoglobin and all his counts dropped; DIC and hemolysis labs were negative; hemoglobin was 5.7 and multiple checks, patient pale; stat 2 units PRBC ordered; went for EGD which was negative for source of bleed; got a CT ab/pelv which was negative for retroperitoneal bleed; likely will need C-scope plus minus pill; abdomen US showed large ascites and possible PVT, paracentesis done today with 2.7 fluid removal, negative for SBP; can not diurese due to VICKY, Cr went from baseline of 1.2, to 1.8 and was given albumin and then blood, eventually needs CT ab/pelv triple phase to evaluate PVT.       Interval History: Patient seen and examined at bedside. No acute events overnight. Responded well to transfusions. Colonoscopy on 01/25, possible stress test on 01/26 prior to presentation.     Review of Systems   Constitutional: Negative for activity change and appetite change.   HENT: Negative for drooling and facial swelling.    Eyes: Negative for discharge and itching.   Respiratory: Negative for cough, shortness of breath and wheezing.    Cardiovascular: Negative for chest pain and palpitations.   Gastrointestinal: Negative for abdominal pain and nausea.   Genitourinary: Negative for difficulty urinating and dysuria.   Skin: Negative for color change and pallor.   Neurological: Negative for dizziness and numbness.   Psychiatric/Behavioral: Negative for behavioral problems and confusion.     Objective:     Vital Signs (Most Recent):  Temp: 97.8 °F (36.6 °C)  (01/23/18 2317)  Pulse: 68 (01/23/18 2350)  Resp: 18 (01/23/18 2317)  BP: 132/60 (01/23/18 2317)  SpO2: 98 % (01/23/18 2317) Vital Signs (24h Range):  Temp:  [97.7 °F (36.5 °C)-98.4 °F (36.9 °C)] 97.8 °F (36.6 °C)  Pulse:  [65-98] 68  Resp:  [16-18] 18  SpO2:  [96 %-99 %] 98 %  BP: (122-150)/(58-66) 132/60     Weight: (!) 144.2 kg (318 lb)  Body mass index is 40.83 kg/m².    Intake/Output Summary (Last 24 hours) at 01/24/18 0013  Last data filed at 01/23/18 1600   Gross per 24 hour   Intake              770 ml   Output              879 ml   Net             -109 ml      Physical Exam   Constitutional: He is oriented to person, place, and time. He appears well-developed and well-nourished.   HENT:   Head: Normocephalic and atraumatic.   Eyes: Conjunctivae are normal. Pupils are equal, round, and reactive to light. Scleral icterus is present.   Neck: Normal range of motion. No thyromegaly present.   Cardiovascular: Normal rate, regular rhythm and normal heart sounds.    Pulmonary/Chest: Effort normal and breath sounds normal.   Abdominal: Soft. He exhibits distension. There is no tenderness.   Neurological: He is alert and oriented to person, place, and time. Coordination normal.   Skin: No rash noted. No erythema.       Significant Labs:   CBC:   Recent Labs  Lab 01/22/18  0849 01/23/18  0519 01/23/18  1614   WBC 1.51* 1.50* 2.13*   HGB 5.6* 7.0* 8.0*   HCT 16.4* 21.0* 23.6*   PLT 33* 31* 33*     CMP:   Recent Labs  Lab 01/22/18  0559 01/23/18  0519   * 136   K 4.4 4.2   * 114*   CO2 15* 17*   * 146*   BUN 16 16   CREATININE 1.8* 1.8*   CALCIUM 8.0* 7.8*   PROT 4.4* 4.2*   ALBUMIN 2.3* 2.1*   BILITOT 1.6* 2.1*   ALKPHOS 64 61   AST 40 37   ALT 22 19   ANIONGAP 7* 5*   EGFRNONAA 39.7* 39.7*     Lactic Acid: No results for input(s): LACTATE in the last 48 hours.  Magnesium:   Recent Labs  Lab 01/22/18  0559 01/23/18  0519   MG 2.0 2.0       Significant Imaging: I have reviewed and interpreted all  pertinent imaging results/findings within the past 24 hours.    Assessment / Plan:     * Decompensated hepatic cirrhosis    # Alcoholic cirrhosis with ascites  MELD-Na score: 21 at 1/23/2018  5:19 AM  MELD score: 20 at 1/23/2018  5:19 AM  Calculated from:  Serum Creatinine: 1.8 mg/dL at 1/23/2018  5:19 AM  Serum Sodium: 136 mmol/L at 1/23/2018  5:19 AM  Total Bilirubin: 2.1 mg/dL at 1/23/2018  5:19 AM  INR(ratio): 1.6 at 1/23/2018  5:19 AM  Age: 62 years   - hepatology consult  - outpatient eval on hold until dobutamine stress test done, may need to initiate inpatient eval             Pancytopenia    - unclear why; possible infection  - DIC labs yesterday were negative, getting repeat fibrinogen        Ascites due to alcoholic cirrhosis    - IR paracentesis ordered         Acute blood loss anemia    - unclear etiology  - transfused 2 units of PRBC 1/22, responded well  - CBC q 8 hrs   - EGD negative for source of bleed  - re-checking DIC labs   - getting CT ab/pelv to r/o retroperitoneal hematoma  - Colonoscopy on 01/25 after clear liquid diet         VICKY (acute kidney injury)    - unclear currently, but possibly due to blood loss  - Cr stable at 1.8, same as yesterday, however baseline of 1.2  - cont albumin and octreotide;   - monitor after blood transfusions         Impetigo    Cellulitis to B/L LE extremities due to suspected staph and strep species   Wound care, no need for IV Vancomycin         Coronary artery disease involving native coronary artery of native heart without angina pectoris    S/p PCI 2/2017.  Took dual antiplatelet therapy x 1 month afterwards, but just on aspirin, statin, and beta-blocker now.  Needs repeat pharmacologic stress echo to be re-presented to selection committee.  Will plan for this once patient's blood counts have stabilized           Hypoalbuminemia    - PAB; boost        Seizures    - cont keppra        Bilateral edema of lower extremity    Due to liver failure and  hypoalbuminemia with venous stasis ulcers  - wound care consult          Hepatic encephalopathy    - patient still seems a little confused and lethargic; ammonia elevated; had 1 BM yesterday, had one this AM; will increase lactulose to 60 g PO q4; cont rifaximin and add zinc; may need enema later; needs 3 to 4 BMs daily          Diastolic dysfunction    - chronic; holding on diuretics due to VICKY  -TTE: normal EF and diastolic function                VTE Risk Mitigation         Ordered     Reason for No Pharmacological VTE Prophylaxis  Once      01/21/18 0418     Reason for no Mechanical VTE Prophylaxis  Once      01/21/18 0418     Medium Risk of VTE  Once      01/21/18 0418           Discharge Disposition: pending Liver transplant evaluation     Time taken to evaluate, plan, and coordinate patient care: 35 minutes.    Abdi Lowry MD  Department of Hospital Medicine  [facility]

## 2018-01-24 NOTE — PROGRESS NOTES
"[facility] Hospital Medicine  Progress Note    Patient Name: Alon Adamson  MRN: [id number]  Patient Class: IP- Inpatient   Admission Date: 1/20/2018  Length of Stay: 3 days  Expected Discharge Date: 1/26/2018  Attending Physician: Abdi Lowry MD  Primary Care Provider: Mckinley Vides MD  [facility] Medicine Team: [facility]OS[facility] MED  Abdi Lowry MD  Principal Problem:Decompensated hepatic cirrhosis    Subjective:     HPI:   62M with cirrhosis presents with recent worsening of abdominal distension and lower extremity edema over the past 1-2 weeks.  Previously his diuretic regimen of lasix 40mg bid and spironolactone 150mg bid was effective in keeping excess fluid off, and he has never been this edematous before.  Other than this he denies any other complaints.  Upon more targeted questioning he admits that about 2 weeks ago his venous stasis ulcers on his shins were more warm and erythematous, and he had some drainage from wounds on his right shin.  He thinks he may have had some fevers then, but those have since resolved and the redness is improved.  His son has been bandaging the wounds and applying silvadene to them.  He does admit to being a bit more fatigued lately.  Appetite has been non-existent for "years" but he makes himself eat regularly.  He denies any hypoglycemic episodes.  He has nearly completed transplant workup which was deferred in 12/2016 for a positive nuclear stress test, for which he underwent PCI and stenting in 2/2017.  Re-presentation to selection committee is pending follow-up repeat stress test which has yet to be completed due to scheduling conflicts on account of family issues.  He has not experienced any cardiovascular symptoms.      Hospital Course:  61 yo male with alcoholic liver cirrhosis who came in on sunday with AYALA and new abdominal scrotum swelling; patient was being worked up as an outpatient, was found to have a coronary lesion that was stented last " year with bare metal stent and completed plavix; was suppose to get dobutamine stress test to re-evaluate, however has missed several appointments, so may need to get it done on this admission; Patient has B/L LE cellulitis and statsis ulcer, improving on Vanc; found to have a drop in his hemoglobin from 10.6 one month ago to 8 yesterday; stated having black stools several weeks back, but none recently; I started him on protonix and octreotide anyways, this morning his hemoglobin and all his counts dropped; DIC and hemolysis labs were negative; hemoglobin was 5.7 and multiple checks, patient pale; stat 2 units PRBC ordered; went for EGD which was negative for source of bleed; got a CT ab/pelv which was negative for retroperitoneal bleed; likely will need C-scope plus minus pill; abdomen US showed large ascites and possible PVT, paracentesis done today with 2.7 fluid removal, negative for SBP; can not diurese due to VICKY, Cr went from baseline of 1.2, to 1.8 and was given albumin and then blood, eventually needs CT ab/pelv triple phase to evaluate PVT. Hemoglobin has remained stable after transfusion. EGD/Colonoscopy to be performed on 1/25. Followed by Dobutamine stress test.       Interval History: Patient seen and examined at bedside, no acute events overnight. HH stable. EGD/Colonoscopy tomorrow.     Review of Systems   Constitutional: Negative for activity change and appetite change.   HENT: Negative for drooling and facial swelling.    Eyes: Negative for discharge and itching.   Respiratory: Negative for cough, shortness of breath and wheezing.    Cardiovascular: Negative for chest pain and palpitations.   Gastrointestinal: Negative for abdominal pain and nausea.   Genitourinary: Negative for difficulty urinating and dysuria.   Skin: Negative for color change and pallor.   Neurological: Negative for dizziness and numbness.   Psychiatric/Behavioral: Negative for behavioral problems and confusion.     Objective:      Vital Signs (Most Recent):  Temp: 98.4 °F (36.9 °C) (01/24/18 1310)  Pulse: 68 (01/24/18 1500)  Resp: 16 (01/24/18 1310)  BP: (!) 144/66 (01/24/18 1310)  SpO2: 100 % (01/24/18 1310) Vital Signs (24h Range):  Temp:  [97.8 °F (36.6 °C)-98.4 °F (36.9 °C)] 98.4 °F (36.9 °C)  Pulse:  [68-87] 68  Resp:  [16-18] 16  SpO2:  [97 %-100 %] 100 %  BP: (125-145)/(57-66) 144/66     Weight: (!) 144.1 kg (317 lb 10.9 oz)  Body mass index is 40.79 kg/m².    Intake/Output Summary (Last 24 hours) at 01/24/18 1548  Last data filed at 01/24/18 1249   Gross per 24 hour   Intake              720 ml   Output             1075 ml   Net             -355 ml      Physical Exam   Constitutional: He is oriented to person, place, and time. He appears well-developed and well-nourished.   HENT:   Head: Normocephalic and atraumatic.   Eyes: Conjunctivae are normal. Pupils are equal, round, and reactive to light. Scleral icterus is present.   Neck: Normal range of motion. No thyromegaly present.   Cardiovascular: Normal rate, regular rhythm and normal heart sounds.    Pulmonary/Chest: Effort normal and breath sounds normal.   Abdominal: Soft. He exhibits distension. There is no tenderness.   Neurological: He is alert and oriented to person, place, and time. Coordination normal.   Skin: No rash noted. No erythema.       Significant Labs:   CBC:   Recent Labs  Lab 01/23/18  1614 01/24/18  0026 01/24/18  0834   WBC 2.13* 1.64* 1.76*   HGB 8.0* 7.3* 7.7*   HCT 23.6* 21.4* 22.6*   PLT 33* 30* 31*     CMP:   Recent Labs  Lab 01/23/18  0519 01/24/18  0550    138   K 4.2 4.4   * 115*   CO2 17* 19*   * 132*   BUN 16 16   CREATININE 1.8* 1.9*   CALCIUM 7.8* 8.1*   PROT 4.2* 4.4*   ALBUMIN 2.1* 2.2*   BILITOT 2.1* 1.7*   ALKPHOS 61 67   AST 37 42*   ALT 19 24   ANIONGAP 5* 4*   EGFRNONAA 39.7* 37.2*     Coagulation:   Recent Labs  Lab 01/24/18  0551   INR 1.6*     Lactic Acid: No results for input(s): LACTATE in the last 48  hours.    Significant Imaging: I have reviewed and interpreted all pertinent imaging results/findings within the past 24 hours.    Assessment / Plan:     * Decompensated hepatic cirrhosis    # Alcoholic cirrhosis with ascites  MELD-Na score: 20 at 1/24/2018  5:51 AM  MELD score: 20 at 1/24/2018  5:51 AM  Calculated from:  Serum Creatinine: 1.9 mg/dL at 1/24/2018  5:50 AM  Serum Sodium: 138 mmol/L (Rounded to 137) at 1/24/2018  5:50 AM  Total Bilirubin: 1.7 mg/dL at 1/24/2018  5:50 AM  INR(ratio): 1.6 at 1/24/2018  5:51 AM  Age: 62 years   - hepatology consult  - outpatient eval on hold until dobutamine stress test done, may need to initiate inpatient eval             Pancytopenia    - unclear why; possible infection  - DIC labs yesterday were negative, getting repeat fibrinogen        Ascites due to alcoholic cirrhosis    - IR paracentesis ordered         Acute blood loss anemia    - unclear etiology  - transfused 2 units of PRBC 1/22, responded well  - CBC q 8 hrs   - EGD negative for source of bleed  - re-checking DIC labs   - getting CT ab/pelv to r/o retroperitoneal hematoma  - Colonoscopy on 01/25 after clear liquid diet         VICKY (acute kidney injury)    - unclear currently, but possibly due to blood loss  - Cr stable at 1.8, same as yesterday and the day before, however baseline of 1.2  - cont albumin and octreotide;   - monitor after blood transfusions         Impetigo    Cellulitis to B/L LE extremities due to suspected staph and strep species   Wound care, no need for IV Vancomycin         Coronary artery disease involving native coronary artery of native heart without angina pectoris    S/p PCI 2/2017.  Took dual antiplatelet therapy x 1 month afterwards, but just on aspirin, statin, and beta-blocker now.  Needs repeat pharmacologic stress echo to be re-presented to selection committee.  Will plan for this once patient's blood counts have stabilized           Anemia of chronic disease    CBC q 8  hrs  Transfused 2 units of PRBCs on 1/22  Transfuse if less than 7          Hypoalbuminemia    - PAB; boost        Cirrhosis of liver without ascites    MELD-Na score: 20 at 1/24/2018  5:51 AM  MELD score: 20 at 1/24/2018  5:51 AM  Calculated from:  Serum Creatinine: 1.9 mg/dL at 1/24/2018  5:50 AM  Serum Sodium: 138 mmol/L (Rounded to 137) at 1/24/2018  5:50 AM  Total Bilirubin: 1.7 mg/dL at 1/24/2018  5:50 AM  INR(ratio): 1.6 at 1/24/2018  5:51 AM  Age: 62 years          Seizures    - cont keppra        Bilateral edema of lower extremity    Due to liver failure and hypoalbuminemia with venous stasis ulcers  - wound care consult           Hepatic encephalopathy    - patient still seems a little confused and lethargic; ammonia elevated; had 1 BM yesterday, had one this AM; will increase lactulose to 60 g PO q4; cont rifaximin and add zinc; may need enema later; needs 3 to 4 BMs daily          Diastolic dysfunction    - chronic; holding on diuretics due to VICKY  -TTE: normal EF and diastolic function                VTE Risk Mitigation         Ordered     Reason for No Pharmacological VTE Prophylaxis  Once      01/21/18 0418     Reason for no Mechanical VTE Prophylaxis  Once      01/21/18 0418     Medium Risk of VTE  Once      01/21/18 0418           Discharge Disposition: pending colonoscopy and Dobutamine stress test     Time taken to evaluate, plan, and coordinate patient care: 35 minutes.    Abdi Lowry MD  Department of Hospital Medicine  [facility]

## 2018-01-24 NOTE — PLAN OF CARE
Problem: Patient Care Overview  Goal: Plan of Care Review  Outcome: Ongoing (interventions implemented as appropriate)  Pt aao x4. Call bell within reach. Pt is up with standby assist. Plan for scope tomorrow, stress test Thursday or Friday and then possible discharge. Pt aware of plan of care. Will continue to monitor.

## 2018-01-24 NOTE — ASSESSMENT & PLAN NOTE
# Alcoholic cirrhosis with ascites  MELD-Na score: 21 at 1/23/2018  5:19 AM  MELD score: 20 at 1/23/2018  5:19 AM  Calculated from:  Serum Creatinine: 1.8 mg/dL at 1/23/2018  5:19 AM  Serum Sodium: 136 mmol/L at 1/23/2018  5:19 AM  Total Bilirubin: 2.1 mg/dL at 1/23/2018  5:19 AM  INR(ratio): 1.6 at 1/23/2018  5:19 AM  Age: 62 years   - hepatology consult  - outpatient eval on hold until dobutamine stress test done, may need to initiate inpatient eval

## 2018-01-24 NOTE — PROGRESS NOTES
[facility]  Hepatology  Progress Note    Patient Name: Alon Adamson  MRN: [id number]  Admission Date: 1/20/2018  Hospital Length of Stay: 3 days  Attending Provider: Abdi Lowry MD   Primary Care Physician: Mckinley Vides MD  Principal Problem:Decompensated hepatic cirrhosis    Subjective:     Transplant status: Pre-transplant    HPI: This is a 61 y/o male with hx of etoh cirrhosis complicated by LE edema, hepatic encephalopathy (followed by Dr. García, splenic artery aneurysm s/p coiling in 2016, deferred for transplant eval currently due to concern for CAD pending dobutamine stress test),  who presents with scrotal and abdominal swelling x 1 week. Assoc with SOB and AYALA. He has noted some dark stools intermittently. Has had worsening redness and weeping over his bilatearl shins.   Initially presented to [facility] and was directed to [facility] [place] [facility] [place].   Admit to taking his lasix 40mg BID and aldactone 150mg bid as well    Interval History:   Awaiting EGD/colonoscopy  Awaiting stress test.   No new complaints.      Current Facility-Administered Medications   Medication    0.9%  NaCl infusion (for blood administration)    atorvastatin tablet 40 mg    dextrose 50% injection 12.5 g    dextrose 50% injection 25 g    docusate sodium capsule 250 mg    ferrous sulfate EC tablet 325 mg    glucagon (human recombinant) injection 1 mg    glucose chewable tablet 16 g    glucose chewable tablet 24 g    insulin aspart pen 0-5 Units    lactulose 20 gram/30 mL solution Soln 30 g    levETIRAcetam tablet 500 mg    magnesium oxide tablet 400 mg    ondansetron injection 4 mg    pantoprazole EC tablet 40 mg    polyethylene glycol (GoLYTELY) solution    [START ON 1/25/2018] polyethylene glycol (GoLYTELY) solution    rifAXIMin tablet 550 mg    sodium chloride 0.9% flush 5 mL    sodium chloride 0.9% flush 5 mL       Objective:     Vital Signs (Most Recent):  Temp: 98.4 °F (36.9 °C)  (01/24/18 1310)  Pulse: 87 (01/24/18 1310)  Resp: 16 (01/24/18 1310)  BP: (!) 144/66 (01/24/18 1310)  SpO2: 100 % (01/24/18 1310) Vital Signs (24h Range):  Temp:  [97.8 °F (36.6 °C)-98.4 °F (36.9 °C)] 98.4 °F (36.9 °C)  Pulse:  [68-98] 87  Resp:  [16-18] 16  SpO2:  [97 %-100 %] 100 %  BP: (125-145)/(57-66) 144/66     Weight: (!) 144.1 kg (317 lb 10.9 oz) (01/24/18 0400)  Body mass index is 40.79 kg/m².    Physical Exam   Constitutional: He is oriented to person, place, and time. He appears ill.   Eyes: No scleral icterus.   Cardiovascular: Normal rate.  Exam reveals no friction rub.    Pulmonary/Chest: Effort normal. No respiratory distress.   Abdominal: Soft. Bowel sounds are normal. He exhibits distension. There is no tenderness. There is no rebound and no guarding.   Musculoskeletal: He exhibits edema.   Neurological: He is alert and oriented to person, place, and time.       MELD-Na score: 20 at 1/24/2018  5:51 AM  MELD score: 20 at 1/24/2018  5:51 AM  Calculated from:  Serum Creatinine: 1.9 mg/dL at 1/24/2018  5:50 AM  Serum Sodium: 138 mmol/L (Rounded to 137) at 1/24/2018  5:50 AM  Total Bilirubin: 1.7 mg/dL at 1/24/2018  5:50 AM  INR(ratio): 1.6 at 1/24/2018  5:51 AM  Age: 62 years    Lab Results   Component Value Date    WBC 1.76 (LL) 01/24/2018    HGB 7.7 (L) 01/24/2018    HCT 22.6 (L) 01/24/2018     (H) 01/24/2018    PLT 31 (LL) 01/24/2018     Lab Results   Component Value Date    INR 1.6 (H) 01/24/2018     Lab Results   Component Value Date     01/24/2018    K 4.4 01/24/2018    CREATININE 1.9 (H) 01/24/2018     Lab Results   Component Value Date    ALBUMIN 2.2 (L) 01/24/2018    ALT 24 01/24/2018    AST 42 (H) 01/24/2018    GGT 95 (H) 10/29/2015    ALKPHOS 67 01/24/2018    BILITOT 1.7 (H) 01/24/2018     Lab Results   Component Value Date    AFP 5.3 04/21/2017     Lab Results   Component Value Date    LIPASE 190 (H) 01/20/2018     No results found for: TACROLIMUS    Imaging:  Reviewed and as  noted in HPI.         Assessment/Plan:     Cirrhosis of liver without ascites    Hx of Etoh cirrhosis undergoing transplant eval who is admitted with volume overload on 1/20/2018.   Currently being treated for with SOB , LE edema, suspected cellulitis  Some concern on labs including anemia but EGD/CT scan unrevealing.   VICKY.   Needs dobutamine stress to complete eval    Recs:  Pending PETH  Awaiting EGD/colonoscopy  Awaiting stress test.   Lactulose and rifaximin  Wound care and complete abx course            Thank you for your consult. I will follow-up with patient. Please contact us if you have any additional questions.    Adriano Medina MD  Hepatology  [facility]

## 2018-01-24 NOTE — HOSPITAL COURSE
61 yo male with alcoholic liver cirrhosis who came in on sunday with AYALA and new abdominal scrotum swelling; patient was being worked up as an outpatient, was found to have a coronary lesion that was stented last year with bare metal stent and completed plavix; was suppose to get dobutamine stress test to re-evaluate, however has missed several appointments, so may need to get it done on this admission; Patient has B/L LE cellulitis and statsis ulcer, improving on Vanc; found to have a drop in his hemoglobin from 10.6 one month ago to 8 yesterday; stated having black stools several weeks back, but none recently; I started him on protonix and octreotide anyways, this morning his hemoglobin and all his counts dropped; DIC and hemolysis labs were negative; hemoglobin was 5.7 and multiple checks, patient pale; stat 2 units PRBC ordered; went for EGD which was negative for source of bleed; got a CT ab/pelv which was negative for retroperitoneal bleed; likely will need C-scope plus minus pill; abdomen US showed large ascites and possible PVT, paracentesis done today with 2.7 fluid removal, negative for SBP; can not diurese due to VICKY, Cr went from baseline of 1.2, to 1.8 and was given albumin and then blood, eventually needs CT ab/pelv triple phase to evaluate PVT. Hemoglobin has remained stable after transfusion. EGD/Colonoscopy performed on 1/25, found polyps, hemorroids and small varices. Dobutamine stress test revealed no acute abnormalities.

## 2018-01-24 NOTE — ASSESSMENT & PLAN NOTE
- unclear currently, but possibly due to blood loss  - Cr stable at 1.8, same as yesterday, however baseline of 1.2  - cont albumin and octreotide;   - monitor after blood transfusions

## 2018-01-24 NOTE — ASSESSMENT & PLAN NOTE
- unclear currently, but possibly due to blood loss  - Cr stable at 1.8, same as yesterday and the day before, however baseline of 1.2  - cont albumin and octreotide;   - monitor after blood transfusions

## 2018-01-24 NOTE — SUBJECTIVE & OBJECTIVE
Interval History: Patient seen and examined at bedside, no acute events overnight. HH stable. EGD/Colonoscopy tomorrow.     Review of Systems   Constitutional: Negative for activity change and appetite change.   HENT: Negative for drooling and facial swelling.    Eyes: Negative for discharge and itching.   Respiratory: Negative for cough, shortness of breath and wheezing.    Cardiovascular: Negative for chest pain and palpitations.   Gastrointestinal: Negative for abdominal pain and nausea.   Genitourinary: Negative for difficulty urinating and dysuria.   Skin: Negative for color change and pallor.   Neurological: Negative for dizziness and numbness.   Psychiatric/Behavioral: Negative for behavioral problems and confusion.     Objective:     Vital Signs (Most Recent):  Temp: 98.4 °F (36.9 °C) (01/24/18 1310)  Pulse: 68 (01/24/18 1500)  Resp: 16 (01/24/18 1310)  BP: (!) 144/66 (01/24/18 1310)  SpO2: 100 % (01/24/18 1310) Vital Signs (24h Range):  Temp:  [97.8 °F (36.6 °C)-98.4 °F (36.9 °C)] 98.4 °F (36.9 °C)  Pulse:  [68-87] 68  Resp:  [16-18] 16  SpO2:  [97 %-100 %] 100 %  BP: (125-145)/(57-66) 144/66     Weight: (!) 144.1 kg (317 lb 10.9 oz)  Body mass index is 40.79 kg/m².    Intake/Output Summary (Last 24 hours) at 01/24/18 1548  Last data filed at 01/24/18 1249   Gross per 24 hour   Intake              720 ml   Output             1075 ml   Net             -355 ml      Physical Exam   Constitutional: He is oriented to person, place, and time. He appears well-developed and well-nourished.   HENT:   Head: Normocephalic and atraumatic.   Eyes: Conjunctivae are normal. Pupils are equal, round, and reactive to light. Scleral icterus is present.   Neck: Normal range of motion. No thyromegaly present.   Cardiovascular: Normal rate, regular rhythm and normal heart sounds.    Pulmonary/Chest: Effort normal and breath sounds normal.   Abdominal: Soft. He exhibits distension. There is no tenderness.   Neurological: He is  alert and oriented to person, place, and time. Coordination normal.   Skin: No rash noted. No erythema.       Significant Labs:   CBC:   Recent Labs  Lab 01/23/18  1614 01/24/18  0026 01/24/18  0834   WBC 2.13* 1.64* 1.76*   HGB 8.0* 7.3* 7.7*   HCT 23.6* 21.4* 22.6*   PLT 33* 30* 31*     CMP:   Recent Labs  Lab 01/23/18  0519 01/24/18  0550    138   K 4.2 4.4   * 115*   CO2 17* 19*   * 132*   BUN 16 16   CREATININE 1.8* 1.9*   CALCIUM 7.8* 8.1*   PROT 4.2* 4.4*   ALBUMIN 2.1* 2.2*   BILITOT 2.1* 1.7*   ALKPHOS 61 67   AST 37 42*   ALT 19 24   ANIONGAP 5* 4*   EGFRNONAA 39.7* 37.2*     Coagulation:   Recent Labs  Lab 01/24/18  0551   INR 1.6*     Lactic Acid: No results for input(s): LACTATE in the last 48 hours.    Significant Imaging: I have reviewed and interpreted all pertinent imaging results/findings within the past 24 hours.

## 2018-01-24 NOTE — ASSESSMENT & PLAN NOTE
Hx of Etoh cirrhosis undergoing transplant eval who is admitted with volume overload on 1/20/2018.   Currently being treated for with SOB , LE edema, suspected cellulitis  Some concern on labs including anemia from baseline and VICKY.   Needs dobutamine stress to complete eval    Recs:  Pending PETH  Awaiting EGD/colonoscopy  Awaiting stress test.   Lactulose and rifaximin  Wound care and complete abx course

## 2018-01-24 NOTE — ASSESSMENT & PLAN NOTE
MELD-Na score: 20 at 1/24/2018  5:51 AM  MELD score: 20 at 1/24/2018  5:51 AM  Calculated from:  Serum Creatinine: 1.9 mg/dL at 1/24/2018  5:50 AM  Serum Sodium: 138 mmol/L (Rounded to 137) at 1/24/2018  5:50 AM  Total Bilirubin: 1.7 mg/dL at 1/24/2018  5:50 AM  INR(ratio): 1.6 at 1/24/2018  5:51 AM  Age: 62 years

## 2018-01-24 NOTE — ASSESSMENT & PLAN NOTE
Cellulitis to B/L LE extremities due to suspected staph and strep species   Wound care, no need for IV Vancomycin

## 2018-01-24 NOTE — ASSESSMENT & PLAN NOTE
# Alcoholic cirrhosis with ascites  MELD-Na score: 20 at 1/24/2018  5:51 AM  MELD score: 20 at 1/24/2018  5:51 AM  Calculated from:  Serum Creatinine: 1.9 mg/dL at 1/24/2018  5:50 AM  Serum Sodium: 138 mmol/L (Rounded to 137) at 1/24/2018  5:50 AM  Total Bilirubin: 1.7 mg/dL at 1/24/2018  5:50 AM  INR(ratio): 1.6 at 1/24/2018  5:51 AM  Age: 62 years   - hepatology consult  - outpatient eval on hold until dobutamine stress test done, may need to initiate inpatient eval

## 2018-01-24 NOTE — ANESTHESIA PREPROCEDURE EVALUATION
[facility]  Anesthesia Pre-Operative Evaluation         Patient Name: Alon Adamson  YOB: 1955  MRN: [id number]    SUBJECTIVE:     Pre-operative evaluation for Procedure(s) (LRB):  ESOPHAGOGASTRODUODENOSCOPY (EGD) (N/A)  COLONOSCOPY (N/A)     01/24/2018    Alon Adamson is a 62 y.o. male w/ a significant PMHx of ETOH cirrhosis, CAD, T2DM, s/p PCI 2/17, seizure d/o, undergoing liver transplant eval presented with VICKY and scrotal edema and pancytopenia found to have hgb 5. Transfused 2 units on admit.    Patient now presents for the above procedure(s).    Most recent hgb 7.7, plt 31    LDA:        Peripheral IV - Single Lumen 01/22/18 0542 Left Forearm (Active)   Site Assessment Clean;Dry;Intact;No redness;No swelling 1/24/2018  7:15 AM   Line Status Flushed 1/24/2018  7:15 AM   Dressing Status Clean;Dry;Intact 1/24/2018  7:15 AM   Dressing Intervention New dressing 1/22/2018  5:42 AM   Dressing Change Due 01/26/18 1/24/2018  7:15 AM   Site Change Due 01/26/18 1/24/2018  7:15 AM   Reason Not Rotated Not due 1/24/2018  7:15 AM   Number of days: 2            Peripheral IV - Single Lumen 01/22/18 0940 Left Upper Arm (Active)   Site Assessment Clean;Dry;Intact;No redness;No swelling 1/24/2018  7:15 AM   Line Status Flushed 1/24/2018  7:15 AM   Dressing Status Clean;Dry;Intact 1/24/2018  7:15 AM   Dressing Change Due 01/26/18 1/24/2018  7:15 AM   Site Change Due 01/26/18 1/24/2018  7:15 AM   Reason Not Rotated Not due 1/24/2018  7:15 AM   Number of days: 2       Prev airway: None documented.    Drips: None documented.      Patient Active Problem List   Diagnosis    Diastolic dysfunction    Pulmonary hypertension, mild    Hepatic encephalopathy    Bilateral edema of lower extremity    Seizures    Cirrhosis of liver without ascites    Hypoalbuminemia    Thrombocytopenia    Anemia of chronic disease    Abnormal cardiovascular stress test    Abnormal dobutamine stress  echocardiogram    Coronary artery disease involving native coronary artery of native heart without angina pectoris    Decompensated hepatic cirrhosis    Impetigo    VICKY (acute kidney injury)    Acute blood loss anemia    Ascites due to alcoholic cirrhosis    Alteration in skin integrity    Pancytopenia       Review of patient's allergies indicates:   Allergen Reactions    Nsaids (non-steroidal anti-inflammatory drug)      D/t to liver disease.    Tylenol [acetaminophen]      D/t liver disease.    Penicillins Other (See Comments)     Since childhood was told not to take it.       Current Inpatient Medications:   atorvastatin  40 mg Oral Daily    ciprofloxacin HCl  500 mg Oral Q12H    docusate sodium  250 mg Oral Daily    ferrous sulfate  325 mg Oral Daily    lactulose  60 g Oral Q6H    levETIRAcetam  500 mg Oral BID    magnesium oxide  400 mg Oral Daily    pantoprazole  40 mg Oral BID AC    polyethylene glycol  2,000 mL Oral Once    [START ON 1/25/2018] polyethylene glycol  2,000 mL Oral Once    rifAXIMin  550 mg Oral BID       No current facility-administered medications on file prior to encounter.      Current Outpatient Prescriptions on File Prior to Encounter   Medication Sig Dispense Refill    ascorbic acid (VITAMIN C) 1000 MG tablet Take 1,000 mg by mouth once daily.      aspirin (ECOTRIN) 81 MG EC tablet Take 81 mg by mouth once daily.      atorvastatin (LIPITOR) 40 MG tablet TAKE 1 TABLET(40 MG) BY MOUTH EVERY DAY 30 tablet 0    docusate sodium (COLACE) 100 MG capsule Take 100 mg by mouth once daily.       fish oil-omega-3 fatty acids 300-1,000 mg capsule Take 2 capsules (2 g total) by mouth once daily. (Patient taking differently: Take 2 g by mouth 2 (two) times daily. )      furosemide (LASIX) 40 MG tablet Take 1 tablet (40 mg total) by mouth 2 (two) times daily. 180 tablet 1    GENERLAC 10 gram/15 mL solution Take 60 mLs (40 g total) by mouth 3 (three) times daily. 60mg three  times per day (Patient taking differently: Take 60 mLs by mouth 3 (three) times daily. ) 5400 mL 6    glyBURIDE-metformin 2.5-500 mg (GLUCOVANCE) 2.5-500 mg per tablet Take 2 tablets by mouth once daily. & 1 tab po PM  10    levETIRAcetam (KEPPRA) 500 MG Tab Take 1 tablet (500 mg total) by mouth 2 (two) times daily. 180 tablet 1    magnesium oxide (MAG-OX) 400 mg tablet Take 1 tablet (400 mg total) by mouth once daily.  0    melatonin 3 mg Tab Take 3 mg by mouth every evening.       ondansetron (ZOFRAN) 4 MG tablet Take 1 tablet (4 mg total) by mouth every 8 (eight) hours as needed for Nausea. 15 tablet 3    rifAXIMin (XIFAXAN) 550 mg Tab Take 1 tablet (550 mg total) by mouth 2 (two) times daily. 60 tablet 5    spironolactone (ALDACTONE) 100 MG tablet Take 1.5 tablets (150 mg total) by mouth 2 (two) times daily. 270 tablet 1    tramadol (ULTRAM) 50 mg tablet Take 50 mg by mouth every 6 (six) hours as needed for Pain.         Past Surgical History:   Procedure Laterality Date    APPENDECTOMY      Open    BACK SURGERY      CHOLECYSTECTOMY      laprascopic    LUMBAR DISCECTOMY      SPLENIC ARTERY EMBOLIZATION  04/08/2016    Dr Taveras    TONSILLECTOMY         Social History     Social History    Marital status:      Spouse name: N/A    Number of children: N/A    Years of education: N/A     Occupational History    Not on file.     Social History Main Topics    Smoking status: Never Smoker    Smokeless tobacco: Current User     Types: Chew    Alcohol use No    Drug use: No    Sexual activity: Not on file     Other Topics Concern    Not on file     Social History Narrative    No narrative on file       OBJECTIVE:     Vital Signs Range (Last 24H):  Temp:  [36.6 °C (97.8 °F)-36.9 °C (98.4 °F)]   Pulse:  [68-98]   Resp:  [16-18]   BP: (125-145)/(57-63)   SpO2:  [97 %-99 %]       CBC:   Recent Labs      01/24/18   0026  01/24/18   0834   WBC  1.64*  1.76*   RBC  2.15*  2.27*   HGB  7.3*  7.7*    HCT  21.4*  22.6*   PLT  30*  31*   MCV  100*  100*   MCH  34.0*  33.9*   MCHC  34.1  34.1       CMP:   Recent Labs      18   0519  18   0550   NA  136  138   K  4.2  4.4   CL  114*  115*   CO2  17*  19*   BUN  16  16   CREATININE  1.8*  1.9*   GLU  146*  132*   MG  2.0  2.3   CALCIUM  7.8*  8.1*   ALBUMIN  2.1*  2.2*   PROT  4.2*  4.4*   ALKPHOS  61  67   ALT  19  24   AST  37  42*   BILITOT  2.1*  1.7*       INR:  Recent Labs      18   0559  18   0519  18   0551   INR  1.7*  1.6*  1.6*       Diagnostic Studies: No relevant studies.    EK18  Vent. Rate : 081 BPM     Atrial Rate : 072 BPM     P-R Int : 000 ms          QRS Dur : 104 ms      QT Int : 418 ms       P-R-T Axes : 000 006 001 degrees     QTc Int : 485 ms    Sinus rhythm  Nonspecific ST abnormality  Prolonged QT  Abnormal ECG    2D ECHO:  Results for orders placed or performed during the hospital encounter of 18   2D echo with color flow doppler   Result Value Ref Range    EF 60 55 - 65    Diastolic Dysfunction No     Est. PA Systolic Pressure 30.03     Tricuspid Valve Regurgitation MILD      CONCLUSIONS     1 - Normal left ventricular systolic function (EF 60-65%).     2 - Biatrial enlargement.     3 - Eccentric hypertrophy.     4 - Normal left ventricular diastolic function.     5 - Normal right ventricular systolic function .     6 - The estimated PA systolic pressure is greater than 30 mmHg.     7 - Mild tricuspid regurgitation.     ASSESSMENT/PLAN:         Anesthesia Evaluation    I have reviewed the Patient Summary Reports.     I have reviewed the Medications.     Review of Systems  Anesthesia Hx:  No problems with previous Anesthesia  History of prior surgery of interest to airway management or planning: Previous anesthesia: General  Denies Personal Hx of Anesthesia complications.   Social:  Former Smoker    Hematology/Oncology:         -- Anemia:   Cardiovascular:   Hypertension CAD  CABG/stent      Pulmonary:   Sleep Apnea    Renal/:   Chronic Renal Disease, ARF    Hepatic/GI:   Liver Disease,    Neurological:   Seizures Hepatic encephalopathy   Endocrine:   Diabetes        Physical Exam  General:  Large Beard, Obesity    Airway/Jaw/Neck:  Airway Findings: Mouth Opening: Normal Tongue: Normal  General Airway Assessment: Adult, Possible difficult mask airway, Possible difficult intubation, Average  Mallampati: IV  TM Distance: Normal, at least 6 cm  Jaw/Neck Findings:  Neck ROM: Normal ROM      Dental:  Dental Findings: In tact   Chest/Lungs:  Chest/Lungs Findings: Normal Respiratory Rate     Heart/Vascular:  Heart Findings: Normal Heart murmur: negative    Abdomen:  Abdomen Findings: Normal      Mental Status:  Mental Status Findings:  Cooperative, Alert and Oriented         Anesthesia Plan  Type of Anesthesia, risks & benefits discussed:  Anesthesia Type:  general, MAC  Patient's Preference: GA  Intra-op Monitoring Plan: standard ASA monitors  Intra-op Monitoring Plan Comments:   Post Op Pain Control Plan: per primary service following discharge from PACU, IV/PO Opioids PRN and multimodal analgesia  Post Op Pain Control Plan Comments:   Induction:   IV  Beta Blocker:  Patient is not currently on a Beta-Blocker (No further documentation required).       Informed Consent: Patient understands risks and agrees with Anesthesia plan.  Questions answered. Anesthesia consent signed with patient.  ASA Score: 3     Day of Surgery Review of History & Physical:  There are no significant changes.  H&P update referred to the provider.         Ready For Surgery From Anesthesia Perspective.

## 2018-01-24 NOTE — ASSESSMENT & PLAN NOTE
- unclear etiology  - transfused 2 units of PRBC 1/22, responded well  - CBC q 8 hrs   - EGD negative for source of bleed  - re-checking DIC labs   - getting CT ab/pelv to r/o retroperitoneal hematoma  - Colonoscopy on 01/25 after clear liquid diet

## 2018-01-24 NOTE — SUBJECTIVE & OBJECTIVE
Interval History:   Awaiting EGD/colonoscopy  Awaiting stress test.   No new complaints.      Current Facility-Administered Medications   Medication    0.9%  NaCl infusion (for blood administration)    atorvastatin tablet 40 mg    dextrose 50% injection 12.5 g    dextrose 50% injection 25 g    docusate sodium capsule 250 mg    ferrous sulfate EC tablet 325 mg    glucagon (human recombinant) injection 1 mg    glucose chewable tablet 16 g    glucose chewable tablet 24 g    insulin aspart pen 0-5 Units    lactulose 20 gram/30 mL solution Soln 30 g    levETIRAcetam tablet 500 mg    magnesium oxide tablet 400 mg    ondansetron injection 4 mg    pantoprazole EC tablet 40 mg    polyethylene glycol (GoLYTELY) solution    [START ON 1/25/2018] polyethylene glycol (GoLYTELY) solution    rifAXIMin tablet 550 mg    sodium chloride 0.9% flush 5 mL    sodium chloride 0.9% flush 5 mL       Objective:     Vital Signs (Most Recent):  Temp: 98.4 °F (36.9 °C) (01/24/18 1310)  Pulse: 87 (01/24/18 1310)  Resp: 16 (01/24/18 1310)  BP: (!) 144/66 (01/24/18 1310)  SpO2: 100 % (01/24/18 1310) Vital Signs (24h Range):  Temp:  [97.8 °F (36.6 °C)-98.4 °F (36.9 °C)] 98.4 °F (36.9 °C)  Pulse:  [68-98] 87  Resp:  [16-18] 16  SpO2:  [97 %-100 %] 100 %  BP: (125-145)/(57-66) 144/66     Weight: (!) 144.1 kg (317 lb 10.9 oz) (01/24/18 0400)  Body mass index is 40.79 kg/m².    Physical Exam   Constitutional: He is oriented to person, place, and time. He appears ill.   Eyes: No scleral icterus.   Cardiovascular: Normal rate.  Exam reveals no friction rub.    Pulmonary/Chest: Effort normal. No respiratory distress.   Abdominal: Soft. Bowel sounds are normal. He exhibits distension. There is no tenderness. There is no rebound and no guarding.   Musculoskeletal: He exhibits edema.   Neurological: He is alert and oriented to person, place, and time.       MELD-Na score: 20 at 1/24/2018  5:51 AM  MELD score: 20 at 1/24/2018  5:51  AM  Calculated from:  Serum Creatinine: 1.9 mg/dL at 1/24/2018  5:50 AM  Serum Sodium: 138 mmol/L (Rounded to 137) at 1/24/2018  5:50 AM  Total Bilirubin: 1.7 mg/dL at 1/24/2018  5:50 AM  INR(ratio): 1.6 at 1/24/2018  5:51 AM  Age: 62 years    Lab Results   Component Value Date    WBC 1.76 (LL) 01/24/2018    HGB 7.7 (L) 01/24/2018    HCT 22.6 (L) 01/24/2018     (H) 01/24/2018    PLT 31 (LL) 01/24/2018     Lab Results   Component Value Date    INR 1.6 (H) 01/24/2018     Lab Results   Component Value Date     01/24/2018    K 4.4 01/24/2018    CREATININE 1.9 (H) 01/24/2018     Lab Results   Component Value Date    ALBUMIN 2.2 (L) 01/24/2018    ALT 24 01/24/2018    AST 42 (H) 01/24/2018    GGT 95 (H) 10/29/2015    ALKPHOS 67 01/24/2018    BILITOT 1.7 (H) 01/24/2018     Lab Results   Component Value Date    AFP 5.3 04/21/2017     Lab Results   Component Value Date    LIPASE 190 (H) 01/20/2018     No results found for: TACROLIMUS    Imaging:  Reviewed and as noted in HPI.

## 2018-01-24 NOTE — SUBJECTIVE & OBJECTIVE
Interval History: Patient seen and examined at bedside. No acute events overnight. Responded well to transfusions. Colonoscopy on 01/25, possible stress test on 01/26 prior to presentation.     Review of Systems   Constitutional: Negative for activity change and appetite change.   HENT: Negative for drooling and facial swelling.    Eyes: Negative for discharge and itching.   Respiratory: Negative for cough, shortness of breath and wheezing.    Cardiovascular: Negative for chest pain and palpitations.   Gastrointestinal: Negative for abdominal pain and nausea.   Genitourinary: Negative for difficulty urinating and dysuria.   Skin: Negative for color change and pallor.   Neurological: Negative for dizziness and numbness.   Psychiatric/Behavioral: Negative for behavioral problems and confusion.     Objective:     Vital Signs (Most Recent):  Temp: 97.8 °F (36.6 °C) (01/23/18 2317)  Pulse: 68 (01/23/18 2350)  Resp: 18 (01/23/18 2317)  BP: 132/60 (01/23/18 2317)  SpO2: 98 % (01/23/18 2317) Vital Signs (24h Range):  Temp:  [97.7 °F (36.5 °C)-98.4 °F (36.9 °C)] 97.8 °F (36.6 °C)  Pulse:  [65-98] 68  Resp:  [16-18] 18  SpO2:  [96 %-99 %] 98 %  BP: (122-150)/(58-66) 132/60     Weight: (!) 144.2 kg (318 lb)  Body mass index is 40.83 kg/m².    Intake/Output Summary (Last 24 hours) at 01/24/18 0013  Last data filed at 01/23/18 1600   Gross per 24 hour   Intake              770 ml   Output              879 ml   Net             -109 ml      Physical Exam   Constitutional: He is oriented to person, place, and time. He appears well-developed and well-nourished.   HENT:   Head: Normocephalic and atraumatic.   Eyes: Conjunctivae are normal. Pupils are equal, round, and reactive to light. Scleral icterus is present.   Neck: Normal range of motion. No thyromegaly present.   Cardiovascular: Normal rate, regular rhythm and normal heart sounds.    Pulmonary/Chest: Effort normal and breath sounds normal.   Abdominal: Soft. He exhibits  distension. There is no tenderness.   Neurological: He is alert and oriented to person, place, and time. Coordination normal.   Skin: No rash noted. No erythema.       Significant Labs:   CBC:   Recent Labs  Lab 01/22/18  0849 01/23/18  0519 01/23/18  1614   WBC 1.51* 1.50* 2.13*   HGB 5.6* 7.0* 8.0*   HCT 16.4* 21.0* 23.6*   PLT 33* 31* 33*     CMP:   Recent Labs  Lab 01/22/18  0559 01/23/18  0519   * 136   K 4.4 4.2   * 114*   CO2 15* 17*   * 146*   BUN 16 16   CREATININE 1.8* 1.8*   CALCIUM 8.0* 7.8*   PROT 4.4* 4.2*   ALBUMIN 2.3* 2.1*   BILITOT 1.6* 2.1*   ALKPHOS 64 61   AST 40 37   ALT 22 19   ANIONGAP 7* 5*   EGFRNONAA 39.7* 39.7*     Lactic Acid: No results for input(s): LACTATE in the last 48 hours.  Magnesium:   Recent Labs  Lab 01/22/18  0559 01/23/18  0519   MG 2.0 2.0       Significant Imaging: I have reviewed and interpreted all pertinent imaging results/findings within the past 24 hours.

## 2018-01-25 ENCOUNTER — SURGERY (OUTPATIENT)
Age: 63
End: 2018-01-25

## 2018-01-25 ENCOUNTER — ANESTHESIA (OUTPATIENT)
Dept: ENDOSCOPY | Facility: HOSPITAL | Age: 63
DRG: 433 | End: 2018-01-25
Payer: COMMERCIAL

## 2018-01-25 LAB
ALBUMIN SERPL BCP-MCNC: 2.4 G/DL
ALP SERPL-CCNC: 73 U/L
ALT SERPL W/O P-5'-P-CCNC: 26 U/L
ANION GAP SERPL CALC-SCNC: 6 MMOL/L
AST SERPL-CCNC: 42 U/L
BACTERIA SPEC AEROBE CULT: NO GROWTH
BASOPHILS # BLD AUTO: 0.03 K/UL
BASOPHILS # BLD AUTO: 0.03 K/UL
BASOPHILS NFR BLD: 1.3 %
BASOPHILS NFR BLD: 1.3 %
BILIRUB SERPL-MCNC: 2.2 MG/DL
BILIRUB UR QL STRIP: NEGATIVE
BLD PROD TYP BPU: NORMAL
BLOOD UNIT EXPIRATION DATE: NORMAL
BLOOD UNIT TYPE CODE: 6200
BLOOD UNIT TYPE: NORMAL
BUN SERPL-MCNC: 12 MG/DL
CALCIUM SERPL-MCNC: 8.1 MG/DL
CHLORIDE SERPL-SCNC: 110 MMOL/L
CLARITY UR REFRACT.AUTO: CLEAR
CO2 SERPL-SCNC: 20 MMOL/L
CODING SYSTEM: NORMAL
COLOR UR AUTO: YELLOW
CREAT SERPL-MCNC: 1.6 MG/DL
DIFFERENTIAL METHOD: ABNORMAL
DIFFERENTIAL METHOD: ABNORMAL
DISPENSE STATUS: NORMAL
EOSINOPHIL # BLD AUTO: 0.1 K/UL
EOSINOPHIL # BLD AUTO: 0.1 K/UL
EOSINOPHIL NFR BLD: 3.4 %
EOSINOPHIL NFR BLD: 3.9 %
ERYTHROCYTE [DISTWIDTH] IN BLOOD BY AUTOMATED COUNT: 15.1 %
ERYTHROCYTE [DISTWIDTH] IN BLOOD BY AUTOMATED COUNT: 15.4 %
EST. GFR  (AFRICAN AMERICAN): 53 ML/MIN/1.73 M^2
EST. GFR  (NON AFRICAN AMERICAN): 45.8 ML/MIN/1.73 M^2
GLUCOSE SERPL-MCNC: 98 MG/DL
GLUCOSE UR QL STRIP: NEGATIVE
HCT VFR BLD AUTO: 24.4 %
HCT VFR BLD AUTO: 25.3 %
HGB BLD-MCNC: 8.2 G/DL
HGB BLD-MCNC: 8.5 G/DL
HGB UR QL STRIP: NEGATIVE
IMM GRANULOCYTES # BLD AUTO: 0.01 K/UL
IMM GRANULOCYTES # BLD AUTO: 0.01 K/UL
IMM GRANULOCYTES NFR BLD AUTO: 0.4 %
IMM GRANULOCYTES NFR BLD AUTO: 0.4 %
INR PPP: 1.6
KETONES UR QL STRIP: NEGATIVE
LEUKOCYTE ESTERASE UR QL STRIP: NEGATIVE
LYMPHOCYTES # BLD AUTO: 0.3 K/UL
LYMPHOCYTES # BLD AUTO: 0.3 K/UL
LYMPHOCYTES NFR BLD: 12.7 %
LYMPHOCYTES NFR BLD: 13.3 %
MAGNESIUM SERPL-MCNC: 2.4 MG/DL
MCH RBC QN AUTO: 34.1 PG
MCH RBC QN AUTO: 34.2 PG
MCHC RBC AUTO-ENTMCNC: 33.6 G/DL
MCHC RBC AUTO-ENTMCNC: 33.6 G/DL
MCV RBC AUTO: 102 FL
MCV RBC AUTO: 102 FL
MICROSCOPIC COMMENT: NORMAL
MONOCYTES # BLD AUTO: 0.5 K/UL
MONOCYTES # BLD AUTO: 0.6 K/UL
MONOCYTES NFR BLD: 20.2 %
MONOCYTES NFR BLD: 23.7 %
NEUTROPHILS # BLD AUTO: 1.4 K/UL
NEUTROPHILS # BLD AUTO: 1.4 K/UL
NEUTROPHILS NFR BLD: 58.5 %
NEUTROPHILS NFR BLD: 60.9 %
NITRITE UR QL STRIP: NEGATIVE
NRBC BLD-RTO: 0 /100 WBC
NRBC BLD-RTO: 0 /100 WBC
PH UR STRIP: 6 [PH] (ref 5–8)
PLATELET # BLD AUTO: 32 K/UL
PLATELET # BLD AUTO: 48 K/UL
PMV BLD AUTO: 11.1 FL
PMV BLD AUTO: 12.2 FL
POCT GLUCOSE: 100 MG/DL (ref 70–110)
POCT GLUCOSE: 104 MG/DL (ref 70–110)
POCT GLUCOSE: 116 MG/DL (ref 70–110)
POCT GLUCOSE: 121 MG/DL (ref 70–110)
POTASSIUM SERPL-SCNC: 4.2 MMOL/L
PROT SERPL-MCNC: 4.9 G/DL
PROT UR QL STRIP: NEGATIVE
PROTHROMBIN TIME: 15.8 SEC
RBC # BLD AUTO: 2.4 M/UL
RBC # BLD AUTO: 2.49 M/UL
RBC #/AREA URNS AUTO: 3 /HPF (ref 0–4)
SODIUM SERPL-SCNC: 136 MMOL/L
SP GR UR STRIP: 1.01 (ref 1–1.03)
SQUAMOUS #/AREA URNS AUTO: 0 /HPF
TRANS PLATPHERESIS VOL PATIENT: NORMAL ML
URN SPEC COLLECT METH UR: NORMAL
UROBILINOGEN UR STRIP-ACNC: NEGATIVE EU/DL
WBC # BLD AUTO: 2.33 K/UL
WBC # BLD AUTO: 2.36 K/UL
WBC #/AREA URNS AUTO: 4 /HPF (ref 0–5)

## 2018-01-25 PROCEDURE — P9035 PLATELET PHERES LEUKOREDUCED: HCPCS | Mod: NTX

## 2018-01-25 PROCEDURE — 37000009 HC ANESTHESIA EA ADD 15 MINS: Mod: NTX | Performed by: INTERNAL MEDICINE

## 2018-01-25 PROCEDURE — 25000003 PHARM REV CODE 250: Mod: NTX | Performed by: HOSPITALIST

## 2018-01-25 PROCEDURE — 85610 PROTHROMBIN TIME: CPT | Mod: NTX

## 2018-01-25 PROCEDURE — 83735 ASSAY OF MAGNESIUM: CPT | Mod: NTX

## 2018-01-25 PROCEDURE — 0DJ08ZZ INSPECTION OF UPPER INTESTINAL TRACT, VIA NATURAL OR ARTIFICIAL OPENING ENDOSCOPIC: ICD-10-PCS | Performed by: INTERNAL MEDICINE

## 2018-01-25 PROCEDURE — 43235 EGD DIAGNOSTIC BRUSH WASH: CPT | Mod: NTX,,, | Performed by: INTERNAL MEDICINE

## 2018-01-25 PROCEDURE — 45378 DIAGNOSTIC COLONOSCOPY: CPT | Mod: NTX | Performed by: INTERNAL MEDICINE

## 2018-01-25 PROCEDURE — 43235 EGD DIAGNOSTIC BRUSH WASH: CPT | Mod: NTX | Performed by: INTERNAL MEDICINE

## 2018-01-25 PROCEDURE — 80053 COMPREHEN METABOLIC PANEL: CPT | Mod: NTX

## 2018-01-25 PROCEDURE — 82962 GLUCOSE BLOOD TEST: CPT | Mod: NTX | Performed by: INTERNAL MEDICINE

## 2018-01-25 PROCEDURE — 45378 DIAGNOSTIC COLONOSCOPY: CPT | Mod: NTX,,, | Performed by: INTERNAL MEDICINE

## 2018-01-25 PROCEDURE — 85025 COMPLETE CBC W/AUTO DIFF WBC: CPT | Mod: NTX

## 2018-01-25 PROCEDURE — 20600001 HC STEP DOWN PRIVATE ROOM: Mod: NTX

## 2018-01-25 PROCEDURE — 36415 COLL VENOUS BLD VENIPUNCTURE: CPT | Mod: NTX

## 2018-01-25 PROCEDURE — 99233 SBSQ HOSP IP/OBS HIGH 50: CPT | Mod: NTX,,, | Performed by: HOSPITALIST

## 2018-01-25 PROCEDURE — 37000008 HC ANESTHESIA 1ST 15 MINUTES: Mod: NTX | Performed by: INTERNAL MEDICINE

## 2018-01-25 PROCEDURE — D9220A PRA ANESTHESIA: Mod: ANES,NTX,, | Performed by: ANESTHESIOLOGY

## 2018-01-25 PROCEDURE — 0DJD8ZZ INSPECTION OF LOWER INTESTINAL TRACT, VIA NATURAL OR ARTIFICIAL OPENING ENDOSCOPIC: ICD-10-PCS | Performed by: INTERNAL MEDICINE

## 2018-01-25 PROCEDURE — 25000003 PHARM REV CODE 250: Mod: NTX | Performed by: NURSE ANESTHETIST, CERTIFIED REGISTERED

## 2018-01-25 PROCEDURE — D9220A PRA ANESTHESIA: Mod: CRNA,NTX,, | Performed by: NURSE ANESTHETIST, CERTIFIED REGISTERED

## 2018-01-25 PROCEDURE — 63600175 PHARM REV CODE 636 W HCPCS: Mod: NTX | Performed by: NURSE ANESTHETIST, CERTIFIED REGISTERED

## 2018-01-25 RX ORDER — PROPOFOL 10 MG/ML
VIAL (ML) INTRAVENOUS CONTINUOUS PRN
Status: DISCONTINUED | OUTPATIENT
Start: 2018-01-25 | End: 2018-01-25

## 2018-01-25 RX ORDER — SODIUM CHLORIDE 0.9 % (FLUSH) 0.9 %
3 SYRINGE (ML) INJECTION
Status: DISCONTINUED | OUTPATIENT
Start: 2018-01-25 | End: 2018-01-27 | Stop reason: HOSPADM

## 2018-01-25 RX ORDER — PROPOFOL 10 MG/ML
VIAL (ML) INTRAVENOUS
Status: DISCONTINUED | OUTPATIENT
Start: 2018-01-25 | End: 2018-01-25

## 2018-01-25 RX ORDER — HYDROCODONE BITARTRATE AND ACETAMINOPHEN 500; 5 MG/1; MG/1
TABLET ORAL
Status: DISCONTINUED | OUTPATIENT
Start: 2018-01-25 | End: 2018-01-27 | Stop reason: HOSPADM

## 2018-01-25 RX ORDER — LIDOCAINE HCL/PF 100 MG/5ML
SYRINGE (ML) INTRAVENOUS
Status: DISCONTINUED | OUTPATIENT
Start: 2018-01-25 | End: 2018-01-25

## 2018-01-25 RX ORDER — SODIUM CHLORIDE 9 MG/ML
INJECTION, SOLUTION INTRAVENOUS CONTINUOUS PRN
Status: DISCONTINUED | OUTPATIENT
Start: 2018-01-25 | End: 2018-01-25

## 2018-01-25 RX ADMIN — LIDOCAINE HYDROCHLORIDE 50 MG: 20 INJECTION, SOLUTION INTRAVENOUS at 12:01

## 2018-01-25 RX ADMIN — FERROUS SULFATE TAB EC 325 MG (65 MG FE EQUIVALENT) 325 MG: 325 (65 FE) TABLET DELAYED RESPONSE at 03:01

## 2018-01-25 RX ADMIN — MAGNESIUM OXIDE TAB 400 MG (241.3 MG ELEMENTAL MG) 400 MG: 400 (241.3 MG) TAB at 03:01

## 2018-01-25 RX ADMIN — RIFAXIMIN 550 MG: 550 TABLET ORAL at 08:01

## 2018-01-25 RX ADMIN — LEVETIRACETAM 500 MG: 500 TABLET ORAL at 08:01

## 2018-01-25 RX ADMIN — SODIUM CHLORIDE: 0.9 INJECTION, SOLUTION INTRAVENOUS at 12:01

## 2018-01-25 RX ADMIN — LEVETIRACETAM 500 MG: 500 TABLET ORAL at 03:01

## 2018-01-25 RX ADMIN — ATORVASTATIN CALCIUM 40 MG: 20 TABLET, FILM COATED ORAL at 03:01

## 2018-01-25 RX ADMIN — DOCUSATE SODIUM 250 MG: 50 CAPSULE, LIQUID FILLED ORAL at 03:01

## 2018-01-25 RX ADMIN — LACTULOSE 30 G: 20 SOLUTION ORAL at 08:01

## 2018-01-25 RX ADMIN — PANTOPRAZOLE SODIUM 40 MG: 40 TABLET, DELAYED RELEASE ORAL at 03:01

## 2018-01-25 RX ADMIN — POLYETHYLENE GLYCOL 3350, SODIUM SULFATE ANHYDROUS, SODIUM BICARBONATE, SODIUM CHLORIDE, POTASSIUM CHLORIDE 2000 ML: 236; 22.74; 6.74; 5.86; 2.97 POWDER, FOR SOLUTION ORAL at 04:01

## 2018-01-25 RX ADMIN — PROPOFOL 100 MG: 10 INJECTION, EMULSION INTRAVENOUS at 12:01

## 2018-01-25 RX ADMIN — PROPOFOL 200 MCG/KG/MIN: 10 INJECTION, EMULSION INTRAVENOUS at 12:01

## 2018-01-25 RX ADMIN — RIFAXIMIN 550 MG: 550 TABLET ORAL at 03:01

## 2018-01-25 RX ADMIN — LACTULOSE 30 G: 20 SOLUTION ORAL at 03:01

## 2018-01-25 NOTE — PROGRESS NOTES
Spoke with Ana in ENDO, aware of the following: critical platelets of 32,000 - Dr. Lowry ordered 1 pack of platelets to be administered during procedure.

## 2018-01-25 NOTE — PROGRESS NOTES
Patient returned to unit per stretcher by transport aide.   No acute distress noted. VSS.   Paged MD for diet orders.   Will continue to monitor.

## 2018-01-25 NOTE — ANESTHESIA POSTPROCEDURE EVALUATION
"Anesthesia Post Evaluation    Patient: Alon Adamson    Procedure(s) Performed: Procedure(s) (LRB):  ESOPHAGOGASTRODUODENOSCOPY (EGD) (N/A)  COLONOSCOPY (N/A)    Final Anesthesia Type: general  Patient location during evaluation: PACU  Patient participation: Yes- Able to Participate  Level of consciousness: awake and alert  Post-procedure vital signs: reviewed and stable  Pain management: adequate  Airway patency: patent  PONV status at discharge: No PONV  Anesthetic complications: no      Cardiovascular status: blood pressure returned to baseline  Respiratory status: unassisted  Hydration status: euvolemic  Follow-up not needed.        Visit Vitals  /60   Pulse 71   Temp 36.4 °C (97.6 °F)   Resp 18   Ht 6' 2" (1.88 m)   Wt (!) 144.1 kg (317 lb 10.9 oz)   SpO2 100%   BMI 40.79 kg/m²       Pain/Ray Score: Pain Assessment Performed: Yes (1/25/2018  2:24 PM)  Presence of Pain: denies (1/25/2018  2:24 PM)  Ray Score: 10 (1/25/2018  2:00 PM)      "

## 2018-01-25 NOTE — PLAN OF CARE
Pt Transported Back to TSU via [facility] PCT on stretcher. Chart with patient. Tele box in place. PCT provides call bell and bed in lowest position.

## 2018-01-25 NOTE — TRANSFER OF CARE
"Anesthesia Transfer of Care Note    Patient: Alon Adamson    Procedure(s) Performed: Procedure(s) (LRB):  ESOPHAGOGASTRODUODENOSCOPY (EGD) (N/A)  COLONOSCOPY (N/A)    Patient location: PACU    Anesthesia Type: general    Transport from OR: Transported from OR on 2-3 L/min O2 by NC with adequate spontaneous ventilation    Post pain: adequate analgesia    Post assessment: no apparent anesthetic complications and tolerated procedure well    Post vital signs: stable    Level of consciousness: sedated    Nausea/Vomiting: no nausea/vomiting    Complications: none    Transfer of care protocol was followed      Last vitals:   Visit Vitals  BP (!) 128/56   Pulse 75   Temp 36.6 °C (97.8 °F)   Resp 18   Ht 6' 2" (1.88 m)   Wt (!) 144.1 kg (317 lb 10.9 oz)   SpO2 99%   BMI 40.79 kg/m²     "

## 2018-01-25 NOTE — PHARMACY MED REC
"MedMined Medication Reconciliation  Template    Admission Medication Reconciliation - Pharmacy Consult Note    The home medication history was taken by April Garza, Pharmacy Technician.  Based on information gathered and subsequent review by the clinical pharmacist, the items below may need attention.     You may go to "Admission" then "Reconcile Home Medications" tabs to review and/or act upon these items. Based on information gathered and subsequent review by the clinical pharmacist, the items below may need attention.    Potentially problematic discrepancies with current MAR  o Spironolactone 150mg po BID and furosemide 40mg po BID on hold for VICKY, metoprolol 12.5mg po BId and aspirin 81mg po daily on hold for potential GI bleed -  Reassess when clinically appropriate.    Please address this information as you see fit.  Feel free to contact us if you have any questions or require assistance.    Shayy Lee, PharmD, PGY-2 Internal Medicine Pharmacy Resident   [id number]    Patient's prior to admission medication regimen was as follows:  Prescriptions Prior to Admission   Medication Sig Dispense Refill Last Dose    ascorbic acid (VITAMIN C) 1000 MG tablet Take 1,000 mg by mouth once daily.   1/20/2018    aspirin (ECOTRIN) 81 MG EC tablet Take 81 mg by mouth once daily.   1/20/2018    atorvastatin (LIPITOR) 40 MG tablet TAKE 1 TABLET(40 MG) BY MOUTH EVERY DAY 30 tablet 0 1/20/2018 at Unknown time    docusate sodium (COLACE) 100 MG capsule Take 100 mg by mouth once daily.    1/20/2018    ferrous gluconate 270 mg (27 mg iron) Tab Take 1 tablet by mouth once daily.       fish oil-omega-3 fatty acids 300-1,000 mg capsule Take 2 capsules (2 g total) by mouth once daily. (Patient taking differently: Take 2 g by mouth 2 (two) times daily. )   1/20/2018    furosemide (LASIX) 40 MG tablet Take 1 tablet (40 mg total) by mouth 2 (two) times daily. 180 tablet 1 1/20/2018    GENERLAC 10 gram/15 mL " solution Take 60 mLs (40 g total) by mouth 3 (three) times daily. 60mg three times per day (Patient taking differently: Take 60 mLs by mouth 3 (three) times daily. ) 5400 mL 6 1/20/2018    glyBURIDE-metformin 2.5-500 mg (GLUCOVANCE) 2.5-500 mg per tablet Take 2 tablets by mouth once daily. & 1 tab po PM  10 1/20/2018    levETIRAcetam (KEPPRA) 500 MG Tab Take 1 tablet (500 mg total) by mouth 2 (two) times daily. 180 tablet 1 1/20/2018    magnesium oxide (MAG-OX) 400 mg tablet Take 1 tablet (400 mg total) by mouth once daily.  0 1/20/2018    melatonin 3 mg Tab Take 3 mg by mouth every evening.    1/19/2018    metoprolol tartrate (LOPRESSOR) 12.5 mg tablet Take 12.5 mg by mouth 2 (two) times daily.       ondansetron (ZOFRAN) 4 MG tablet Take 1 tablet (4 mg total) by mouth every 8 (eight) hours as needed for Nausea. 15 tablet 3 1/18/2018    rifAXIMin (XIFAXAN) 550 mg Tab Take 1 tablet (550 mg total) by mouth 2 (two) times daily. 60 tablet 5 1/20/2018    spironolactone (ALDACTONE) 100 MG tablet Take 1.5 tablets (150 mg total) by mouth 2 (two) times daily. 270 tablet 1 1/20/2018    tramadol (ULTRAM) 50 mg tablet Take 50 mg by mouth every 6 (six) hours as needed for Pain.   Taking         Please add appropriate    SmartPhrase below:

## 2018-01-25 NOTE — PROGRESS NOTES
Patient transferred to [facility] per stretcher by transport aide.   Transport aide aware of need for telemetry monitoring.   Chart with signed consents sent with patient.   No acute distress noted. Will monitor for return.

## 2018-01-25 NOTE — INTERVAL H&P NOTE
The patient has been examined and the H&P has been reviewed:    I concur with the findings and no changes have occurred since H&P was written.    Anesthesia/Surgery risks, benefits and alternative options discussed and understood by patient/family.    Pre-Procedure H and P Addendum    Patient seen and examined.  History and exam unchanged from prior history and physical.      Procedure: EGD/colon   Indication: KIT, melena, drop in Hg in setting of cirrhosis  ASA Class: per anesthesiology  Airway: per anesthesia  Neck Mobility: full range of motion  Mallampatti score: per anesthesia  History of anesthesia problems: no  Family history of anesthesia problems: no  Anesthesia Plan: per anesthesia        Active Hospital Problems    Diagnosis  POA    *Decompensated hepatic cirrhosis [K72.90]  Yes    Acute blood loss anemia [D62]  Yes    Ascites due to alcoholic cirrhosis [K70.31]  Yes    Alteration in skin integrity [R23.9]  Yes    Pancytopenia [D61.818]  No    Impetigo [L01.00]  Yes    VICKY (acute kidney injury) [N17.9]  Yes    Coronary artery disease involving native coronary artery of native heart without angina pectoris [I25.10]  Yes    Hypoalbuminemia [E88.09]  Yes    Anemia of chronic disease [D63.8]  Yes    Cirrhosis of liver without ascites [K74.60]  Yes    Seizures [R56.9]  Yes    Hepatic encephalopathy [K72.90]  Yes    Bilateral edema of lower extremity [R60.0]  Yes    Diastolic dysfunction [I51.9]  Yes      Resolved Hospital Problems    Diagnosis Date Resolved POA   No resolved problems to display.

## 2018-01-25 NOTE — PROVATION PATIENT INSTRUCTIONS
Discharge Summary/Instructions after an Endoscopic Procedure  Patient Name: Alon Adamson  Patient MRN: [id number]  Patient YOB: 1955  Thursday, January 25, 2018  Anju Myles MD  RESTRICTIONS:  During your procedure today, you received medications for sedation.  These   medications may affect your judgment, balance and coordination.  Therefore,   for 24 hours, you have the following restrictions:   - DO NOT drive a car, operate machinery, make legal/financial decisions,   sign important papers or drink alcohol.    ACTIVITY:  The following day: return to full activity including work, except no heavy   lifting, straining or running for 3 days if polyps were removed.  DIET:  Eat and drink normally unless instructed otherwise.     TREATMENT FOR COMMON SIDE EFFECTS:  - Mild abdominal pain, belching, bloating or excessive gas: rest, eat   lightly and use a heating pad.  - Sore Throat: treat with throat lozenges and/or gargle with warm salt   water.  SYMPTOMS TO WATCH FOR AND REPORT TO YOUR PHYSICIAN:  1. Abdominal pain or bloating, other than gas cramps.  2. Chest pain.  3. Back pain.  4. Chills or fever occurring within 24 hours after the procedure.  5. Rectal bleeding, which would show as bright red, maroon, or black stools.   (A tablespoon of blood from the rectum is not serious, especially if   hemorrhoids are present.)  6. Vomiting.  7. Weakness or dizziness.  8. Because air was used during the procedure, expelling large amounts of air   from your rectum or belching is normal.  9. If a bowel prep was taken, you may not have a bowel movement for 1-3   days.  This is normal.  GO DIRECTLY TO THE NEAREST EMERGENCY ROOM IF YOU HAVE ANY OF THE FOLLOWING:      Difficulty breathing  Chills and/or fever over 101 F   Persistent vomiting and/or vomiting blood   Severe abdominal pain   Severe chest pain   Black, tarry stools   Bleeding- more than one tablespoon   Any other symptom or condition that you may feel  needs urgent attention  Your doctor recommends these additional instructions:  If any biopsies were taken, your doctor s clinic will contact you in 1 to 2   weeks with any results.  Resume your previous diet.   Continue your present medications.   Your physician has recommended a repeat colonoscopy at appointment to be   scheduled for retreatment.   The findings and recommendations have been discussed with you.   You have a contact number available for emergencies.  The signs and symptoms   of potential delayed complications were discussed with you.  You may return   to normal activities tomorrow.  Written discharge instructions were   provided to you.  For questions, problems or results please call your physician - Anju Myles MD at Work:  [phone].  [facility], EMERGENCY ROOM PHONE NUMBER: [phone]  IF A COMPLICATION OR EMERGENCY SITUATION ARISES AND YOU ARE UNABLE TO REACH   YOUR PHYSICIAN - GO DIRECTLY TO THE EMERGENCY ROOM.  Anju Myles MD  1/25/2018 1:34:03 PM  This report has been verified and signed electronically.

## 2018-01-25 NOTE — PLAN OF CARE
Problem: Patient Care Overview  Goal: Plan of Care Review  Patient is AAOx4. Bed low, locked, call bell within reach, non skid socks on. Patient educated to use call bell for assistance, verbalized understanding. Patient remains free from falls or injury so far this shift. Patient ambulates with standby assistance.   Afebrile, WBC 2.36.   Telemetry monitoring SR 70's-80's.  EGD and coloscopy today - patient tolerated procedure well. Patient received 1 pack of platelets during procedure for low platelet count of 32,000.   Accuchecks AC/HS - no SSI needed so far this shift.   Patient had 2 bowel movements so far this shift. CR 1.6 - urine output 400 cc so far this shift.   No complaints of pain.   Citric acid ointment applied to sacrum and Sween lotion applied to BLE per wound care orders.   See flow sheet for complete assessment details.

## 2018-01-26 ENCOUNTER — DOCUMENTATION ONLY (OUTPATIENT)
Dept: CARDIOLOGY | Facility: CLINIC | Age: 63
End: 2018-01-26

## 2018-01-26 LAB
ALBUMIN SERPL BCP-MCNC: 2.1 G/DL
ALP SERPL-CCNC: 79 U/L
ALT SERPL W/O P-5'-P-CCNC: 26 U/L
ANION GAP SERPL CALC-SCNC: 4 MMOL/L
ANISOCYTOSIS BLD QL SMEAR: SLIGHT
AST SERPL-CCNC: 55 U/L
BACTERIA UR CULT: NO GROWTH
BASOPHILS # BLD AUTO: 0.01 K/UL
BASOPHILS # BLD AUTO: 0.01 K/UL
BASOPHILS NFR BLD: 0.6 %
BASOPHILS NFR BLD: 0.7 %
BILIRUB SERPL-MCNC: 1.7 MG/DL
BUN SERPL-MCNC: 12 MG/DL
CALCIUM SERPL-MCNC: 7.6 MG/DL
CHLORIDE SERPL-SCNC: 113 MMOL/L
CO2 SERPL-SCNC: 19 MMOL/L
CREAT SERPL-MCNC: 1.5 MG/DL
DIASTOLIC DYSFUNCTION: NO
DIFFERENTIAL METHOD: ABNORMAL
DIFFERENTIAL METHOD: ABNORMAL
EOSINOPHIL # BLD AUTO: 0.1 K/UL
EOSINOPHIL # BLD AUTO: 0.1 K/UL
EOSINOPHIL NFR BLD: 3.3 %
EOSINOPHIL NFR BLD: 3.6 %
ERYTHROCYTE [DISTWIDTH] IN BLOOD BY AUTOMATED COUNT: 15.2 %
ERYTHROCYTE [DISTWIDTH] IN BLOOD BY AUTOMATED COUNT: 15.2 %
EST. GFR  (AFRICAN AMERICAN): 57.3 ML/MIN/1.73 M^2
EST. GFR  (NON AFRICAN AMERICAN): 49.5 ML/MIN/1.73 M^2
GLUCOSE SERPL-MCNC: 128 MG/DL
HCT VFR BLD AUTO: 21.4 %
HCT VFR BLD AUTO: 22.7 %
HGB BLD-MCNC: 7.3 G/DL
HGB BLD-MCNC: 7.6 G/DL
HYPOCHROMIA BLD QL SMEAR: ABNORMAL
IMM GRANULOCYTES # BLD AUTO: 0 K/UL
IMM GRANULOCYTES # BLD AUTO: 0.01 K/UL
IMM GRANULOCYTES NFR BLD AUTO: 0 %
IMM GRANULOCYTES NFR BLD AUTO: 0.7 %
INR PPP: 1.6
LYMPHOCYTES # BLD AUTO: 0.3 K/UL
LYMPHOCYTES # BLD AUTO: 0.4 K/UL
LYMPHOCYTES NFR BLD: 21.9 %
LYMPHOCYTES NFR BLD: 22.2 %
MAGNESIUM SERPL-MCNC: 2 MG/DL
MCH RBC QN AUTO: 33.8 PG
MCH RBC QN AUTO: 34.3 PG
MCHC RBC AUTO-ENTMCNC: 33.5 G/DL
MCHC RBC AUTO-ENTMCNC: 34.1 G/DL
MCV RBC AUTO: 101 FL
MCV RBC AUTO: 101 FL
MITRAL VALVE MOBILITY: NORMAL
MONOCYTES # BLD AUTO: 0.3 K/UL
MONOCYTES # BLD AUTO: 0.4 K/UL
MONOCYTES NFR BLD: 18.5 %
MONOCYTES NFR BLD: 21.6 %
NEUTROPHILS # BLD AUTO: 0.8 K/UL
NEUTROPHILS # BLD AUTO: 0.9 K/UL
NEUTROPHILS NFR BLD: 52 %
NEUTROPHILS NFR BLD: 54.9 %
NRBC BLD-RTO: 0 /100 WBC
NRBC BLD-RTO: 0 /100 WBC
PHOSPHATIDYLETHANOL (PETH): NEGATIVE NG/ML
PLATELET # BLD AUTO: 38 K/UL
PLATELET # BLD AUTO: 38 K/UL
PMV BLD AUTO: 12.1 FL
PMV BLD AUTO: 12.1 FL
POCT GLUCOSE: 126 MG/DL (ref 70–110)
POCT GLUCOSE: 132 MG/DL (ref 70–110)
POCT GLUCOSE: 135 MG/DL (ref 70–110)
POCT GLUCOSE: 160 MG/DL (ref 70–110)
POLYCHROMASIA BLD QL SMEAR: ABNORMAL
POTASSIUM SERPL-SCNC: 4.1 MMOL/L
PROT SERPL-MCNC: 4.3 G/DL
PROTHROMBIN TIME: 16 SEC
RBC # BLD AUTO: 2.13 M/UL
RBC # BLD AUTO: 2.25 M/UL
RETIRED EF AND QEF - SEE NOTES: 60 (ref 55–65)
SODIUM SERPL-SCNC: 136 MMOL/L
WBC # BLD AUTO: 1.51 K/UL
WBC # BLD AUTO: 1.67 K/UL

## 2018-01-26 PROCEDURE — 83735 ASSAY OF MAGNESIUM: CPT | Mod: NTX

## 2018-01-26 PROCEDURE — 25000003 PHARM REV CODE 250: Mod: NTX | Performed by: HOSPITALIST

## 2018-01-26 PROCEDURE — 99233 SBSQ HOSP IP/OBS HIGH 50: CPT | Mod: NTX,,, | Performed by: HOSPITALIST

## 2018-01-26 PROCEDURE — 99232 SBSQ HOSP IP/OBS MODERATE 35: CPT | Mod: NTX,,, | Performed by: INTERNAL MEDICINE

## 2018-01-26 PROCEDURE — 93321 DOPPLER ECHO F-UP/LMTD STD: CPT | Mod: NTX

## 2018-01-26 PROCEDURE — 20600001 HC STEP DOWN PRIVATE ROOM: Mod: NTX

## 2018-01-26 PROCEDURE — 80053 COMPREHEN METABOLIC PANEL: CPT | Mod: NTX

## 2018-01-26 PROCEDURE — 93325 DOPPLER ECHO COLOR FLOW MAPG: CPT | Mod: 26,NTX,, | Performed by: INTERNAL MEDICINE

## 2018-01-26 PROCEDURE — 93351 STRESS TTE COMPLETE: CPT | Mod: 26,NTX,, | Performed by: INTERNAL MEDICINE

## 2018-01-26 PROCEDURE — 93320 DOPPLER ECHO COMPLETE: CPT | Mod: 26,NTX,, | Performed by: INTERNAL MEDICINE

## 2018-01-26 PROCEDURE — 85025 COMPLETE CBC W/AUTO DIFF WBC: CPT | Mod: NTX

## 2018-01-26 PROCEDURE — 36415 COLL VENOUS BLD VENIPUNCTURE: CPT | Mod: NTX

## 2018-01-26 PROCEDURE — 85610 PROTHROMBIN TIME: CPT | Mod: NTX

## 2018-01-26 RX ADMIN — RIFAXIMIN 550 MG: 550 TABLET ORAL at 09:01

## 2018-01-26 RX ADMIN — PANTOPRAZOLE SODIUM 40 MG: 40 TABLET, DELAYED RELEASE ORAL at 03:01

## 2018-01-26 RX ADMIN — MAGNESIUM OXIDE TAB 400 MG (241.3 MG ELEMENTAL MG) 400 MG: 400 (241.3 MG) TAB at 03:01

## 2018-01-26 RX ADMIN — LACTULOSE 30 G: 20 SOLUTION ORAL at 09:01

## 2018-01-26 RX ADMIN — LEVETIRACETAM 500 MG: 500 TABLET ORAL at 09:01

## 2018-01-26 RX ADMIN — DOCUSATE SODIUM 250 MG: 50 CAPSULE, LIQUID FILLED ORAL at 03:01

## 2018-01-26 RX ADMIN — LEVETIRACETAM 500 MG: 500 TABLET ORAL at 08:01

## 2018-01-26 RX ADMIN — PANTOPRAZOLE SODIUM 40 MG: 40 TABLET, DELAYED RELEASE ORAL at 05:01

## 2018-01-26 RX ADMIN — LACTULOSE 30 G: 20 SOLUTION ORAL at 05:01

## 2018-01-26 RX ADMIN — ATORVASTATIN CALCIUM 40 MG: 20 TABLET, FILM COATED ORAL at 03:01

## 2018-01-26 RX ADMIN — FERROUS SULFATE TAB EC 325 MG (65 MG FE EQUIVALENT) 325 MG: 325 (65 FE) TABLET DELAYED RESPONSE at 03:01

## 2018-01-26 RX ADMIN — LACTULOSE 30 G: 20 SOLUTION ORAL at 03:01

## 2018-01-26 RX ADMIN — RIFAXIMIN 550 MG: 550 TABLET ORAL at 08:01

## 2018-01-26 NOTE — PLAN OF CARE
Problem: Patient Care Overview  Goal: Plan of Care Review  Pt aaox4 vswnl and no c/o pain. Bed in low position and callbell within reach. Pt ambulates with standby assist. Scrotal edema and ble +2. Redness and venous stasis mottling to ble.  egd and colonsocopy  Done on dayshift. No bleed noted. H&h=8.5 and 24. plts 32 and 1 pack given. Pt npo after mn for stress test in am. Telemetry maintained nsr. accu ck at 2100=160.

## 2018-01-26 NOTE — ASSESSMENT & PLAN NOTE
- unclear etiology  - transfused 2 units of PRBC 1/22, responded well  - CBC q 8 hrs   - EGD negative for source of bleed  - re-checking DIC labs   - getting CT ab/pelv to r/o retroperitoneal hematoma  - EGD/Colonoscopy revealed small varices, hemorrhoids and cecum polyp.

## 2018-01-26 NOTE — SUBJECTIVE & OBJECTIVE
Interval History: Patient seen and examined at bedside, no acute events overnight. EGD/Colonoscopy revealed polyp in the cecum and internal hemorrhoids as well as small varices.     Review of Systems   Constitutional: Negative for activity change and appetite change.   HENT: Negative for drooling and facial swelling.    Eyes: Negative for discharge and itching.   Respiratory: Negative for cough, shortness of breath and wheezing.    Cardiovascular: Negative for chest pain and palpitations.   Gastrointestinal: Negative for abdominal pain and nausea.   Genitourinary: Negative for difficulty urinating and dysuria.   Skin: Negative for color change and pallor.   Neurological: Negative for dizziness and numbness.   Psychiatric/Behavioral: Negative for behavioral problems and confusion.     Objective:     Vital Signs (Most Recent):  Temp: 97.5 °F (36.4 °C) (01/25/18 1500)  Pulse: 73 (01/25/18 1500)  Resp: 18 (01/25/18 1500)  BP: (!) 131/52 (01/25/18 1500)  SpO2: 96 % (01/25/18 1500) Vital Signs (24h Range):  Temp:  [97.5 °F (36.4 °C)-98.9 °F (37.2 °C)] 97.5 °F (36.4 °C)  Pulse:  [71-98] 73  Resp:  [16-20] 18  SpO2:  [95 %-100 %] 96 %  BP: (103-164)/(38-76) 131/52     Weight: (!) 144.1 kg (317 lb 10.9 oz)  Body mass index is 40.79 kg/m².    Intake/Output Summary (Last 24 hours) at 01/25/18 1817  Last data filed at 01/25/18 1600   Gross per 24 hour   Intake              520 ml   Output              500 ml   Net               20 ml      Physical Exam   Constitutional: He is oriented to person, place, and time. He appears well-developed and well-nourished.   HENT:   Head: Normocephalic and atraumatic.   Eyes: Conjunctivae are normal. Pupils are equal, round, and reactive to light. Scleral icterus is present.   Neck: Normal range of motion. No thyromegaly present.   Cardiovascular: Normal rate, regular rhythm and normal heart sounds.    Pulmonary/Chest: Effort normal and breath sounds normal.   Abdominal: Soft. He exhibits  distension. There is no tenderness.   Neurological: He is alert and oriented to person, place, and time. Coordination normal.   Skin: No rash noted. No erythema.       Significant Labs:   CBC:     Recent Labs  Lab 01/24/18  1548 01/25/18  0818 01/25/18  1743   WBC 1.86* 2.36* 2.33*   HGB 7.9* 8.2* 8.5*   HCT 23.6* 24.4* 25.3*   PLT 30* 32* 48*     CMP:     Recent Labs  Lab 01/24/18  0550 01/25/18  0523    136   K 4.4 4.2   * 110   CO2 19* 20*   * 98   BUN 16 12   CREATININE 1.9* 1.6*   CALCIUM 8.1* 8.1*   PROT 4.4* 4.9*   ALBUMIN 2.2* 2.4*   BILITOT 1.7* 2.2*   ALKPHOS 67 73   AST 42* 42*   ALT 24 26   ANIONGAP 4* 6*   EGFRNONAA 37.2* 45.8*     Coagulation:     Recent Labs  Lab 01/25/18  0523   INR 1.6*     Lactic Acid: No results for input(s): LACTATE in the last 48 hours.    Significant Imaging: I have reviewed and interpreted all pertinent imaging results/findings within the past 24 hours.

## 2018-01-26 NOTE — ASSESSMENT & PLAN NOTE
MELD-Na score: 20 at 1/25/2018  5:23 AM  MELD score: 19 at 1/25/2018  5:23 AM  Calculated from:  Serum Creatinine: 1.6 mg/dL at 1/25/2018  5:23 AM  Serum Sodium: 136 mmol/L at 1/25/2018  5:23 AM  Total Bilirubin: 2.2 mg/dL at 1/25/2018  5:23 AM  INR(ratio): 1.6 at 1/25/2018  5:23 AM  Age: 62 years   Cont PPI  EGD revealed small varices

## 2018-01-26 NOTE — ASSESSMENT & PLAN NOTE
# Alcoholic cirrhosis with ascites  MELD-Na score: 20 at 1/25/2018  5:23 AM  MELD score: 19 at 1/25/2018  5:23 AM  Calculated from:  Serum Creatinine: 1.6 mg/dL at 1/25/2018  5:23 AM  Serum Sodium: 136 mmol/L at 1/25/2018  5:23 AM  Total Bilirubin: 2.2 mg/dL at 1/25/2018  5:23 AM  INR(ratio): 1.6 at 1/25/2018  5:23 AM  Age: 62 years   - hepatology consult  - outpatient eval on hold until dobutamine stress test done, may need to initiate inpatient eval

## 2018-01-26 NOTE — ASSESSMENT & PLAN NOTE
Hx of Etoh cirrhosis undergoing transplant eval who is admitted with volume overload on 1/20/2018.   Currently being treated for with SOB , LE edema and cellulitis.   Some concern on labs including anemia - negative w/u with EGD, colon and CT scan  Negative PETH 1/21.    Recs:  Awaiting stress test to complete eval    Continue Lactulose and rifaximin

## 2018-01-26 NOTE — PROGRESS NOTES
"[facility] Hospital Medicine  Progress Note    Patient Name: Alon Adamson  MRN: [id number]  Patient Class: IP- Inpatient   Admission Date: 1/20/2018  Length of Stay: 4 days  Expected Discharge Date: 1/29/2018  Attending Physician: Abdi Lowry MD  Primary Care Provider: Mckinley Vides MD  [facility] Medicine Team: [facility] HOSP MED  Abdi Lowry MD  Principal Problem:Decompensated hepatic cirrhosis    Subjective:     HPI:   62M with cirrhosis presents with recent worsening of abdominal distension and lower extremity edema over the past 1-2 weeks.  Previously his diuretic regimen of lasix 40mg bid and spironolactone 150mg bid was effective in keeping excess fluid off, and he has never been this edematous before.  Other than this he denies any other complaints.  Upon more targeted questioning he admits that about 2 weeks ago his venous stasis ulcers on his shins were more warm and erythematous, and he had some drainage from wounds on his right shin.  He thinks he may have had some fevers then, but those have since resolved and the redness is improved.  His son has been bandaging the wounds and applying silvadene to them.  He does admit to being a bit more fatigued lately.  Appetite has been non-existent for "years" but he makes himself eat regularly.  He denies any hypoglycemic episodes.  He has nearly completed transplant workup which was deferred in 12/2016 for a positive nuclear stress test, for which he underwent PCI and stenting in 2/2017.  Re-presentation to selection committee is pending follow-up repeat stress test which has yet to be completed due to scheduling conflicts on account of family issues.  He has not experienced any cardiovascular symptoms.      Hospital Course:  61 yo male with alcoholic liver cirrhosis who came in on sunday with AYALA and new abdominal scrotum swelling; patient was being worked up as an outpatient, was found to have a coronary lesion that was stented last " year with bare metal stent and completed plavix; was suppose to get dobutamine stress test to re-evaluate, however has missed several appointments, so may need to get it done on this admission; Patient has B/L LE cellulitis and statsis ulcer, improving on Vanc; found to have a drop in his hemoglobin from 10.6 one month ago to 8 yesterday; stated having black stools several weeks back, but none recently; I started him on protonix and octreotide anyways, this morning his hemoglobin and all his counts dropped; DIC and hemolysis labs were negative; hemoglobin was 5.7 and multiple checks, patient pale; stat 2 units PRBC ordered; went for EGD which was negative for source of bleed; got a CT ab/pelv which was negative for retroperitoneal bleed; likely will need C-scope plus minus pill; abdomen US showed large ascites and possible PVT, paracentesis done today with 2.7 fluid removal, negative for SBP; can not diurese due to VICKY, Cr went from baseline of 1.2, to 1.8 and was given albumin and then blood, eventually needs CT ab/pelv triple phase to evaluate PVT. Hemoglobin has remained stable after transfusion. EGD/Colonoscopy performed on 1/25, found polyps, hemorroids and small varices. Dobutamine stress test ordered for 01/26.       Interval History: Patient seen and examined at bedside, no acute events overnight. EGD/Colonoscopy revealed polyp in the cecum and internal hemorrhoids as well as small varices.     Review of Systems   Constitutional: Negative for activity change and appetite change.   HENT: Negative for drooling and facial swelling.    Eyes: Negative for discharge and itching.   Respiratory: Negative for cough, shortness of breath and wheezing.    Cardiovascular: Negative for chest pain and palpitations.   Gastrointestinal: Negative for abdominal pain and nausea.   Genitourinary: Negative for difficulty urinating and dysuria.   Skin: Negative for color change and pallor.   Neurological: Negative for dizziness  and numbness.   Psychiatric/Behavioral: Negative for behavioral problems and confusion.     Objective:     Vital Signs (Most Recent):  Temp: 97.5 °F (36.4 °C) (01/25/18 1500)  Pulse: 73 (01/25/18 1500)  Resp: 18 (01/25/18 1500)  BP: (!) 131/52 (01/25/18 1500)  SpO2: 96 % (01/25/18 1500) Vital Signs (24h Range):  Temp:  [97.5 °F (36.4 °C)-98.9 °F (37.2 °C)] 97.5 °F (36.4 °C)  Pulse:  [71-98] 73  Resp:  [16-20] 18  SpO2:  [95 %-100 %] 96 %  BP: (103-164)/(38-76) 131/52     Weight: (!) 144.1 kg (317 lb 10.9 oz)  Body mass index is 40.79 kg/m².    Intake/Output Summary (Last 24 hours) at 01/25/18 1817  Last data filed at 01/25/18 1600   Gross per 24 hour   Intake              520 ml   Output              500 ml   Net               20 ml      Physical Exam   Constitutional: He is oriented to person, place, and time. He appears well-developed and well-nourished.   HENT:   Head: Normocephalic and atraumatic.   Eyes: Conjunctivae are normal. Pupils are equal, round, and reactive to light. Scleral icterus is present.   Neck: Normal range of motion. No thyromegaly present.   Cardiovascular: Normal rate, regular rhythm and normal heart sounds.    Pulmonary/Chest: Effort normal and breath sounds normal.   Abdominal: Soft. He exhibits distension. There is no tenderness.   Neurological: He is alert and oriented to person, place, and time. Coordination normal.   Skin: No rash noted. No erythema.       Significant Labs:   CBC:     Recent Labs  Lab 01/24/18  1548 01/25/18  0818 01/25/18  1743   WBC 1.86* 2.36* 2.33*   HGB 7.9* 8.2* 8.5*   HCT 23.6* 24.4* 25.3*   PLT 30* 32* 48*     CMP:     Recent Labs  Lab 01/24/18  0550 01/25/18  0523    136   K 4.4 4.2   * 110   CO2 19* 20*   * 98   BUN 16 12   CREATININE 1.9* 1.6*   CALCIUM 8.1* 8.1*   PROT 4.4* 4.9*   ALBUMIN 2.2* 2.4*   BILITOT 1.7* 2.2*   ALKPHOS 67 73   AST 42* 42*   ALT 24 26   ANIONGAP 4* 6*   EGFRNONAA 37.2* 45.8*     Coagulation:     Recent Labs  Lab  01/25/18  0523   INR 1.6*     Lactic Acid: No results for input(s): LACTATE in the last 48 hours.    Significant Imaging: I have reviewed and interpreted all pertinent imaging results/findings within the past 24 hours.    Assessment / Plan:     * Decompensated hepatic cirrhosis    # Alcoholic cirrhosis with ascites  MELD-Na score: 20 at 1/25/2018  5:23 AM  MELD score: 19 at 1/25/2018  5:23 AM  Calculated from:  Serum Creatinine: 1.6 mg/dL at 1/25/2018  5:23 AM  Serum Sodium: 136 mmol/L at 1/25/2018  5:23 AM  Total Bilirubin: 2.2 mg/dL at 1/25/2018  5:23 AM  INR(ratio): 1.6 at 1/25/2018  5:23 AM  Age: 62 years   - hepatology consult  - outpatient eval on hold until dobutamine stress test done, may need to initiate inpatient eval             Pancytopenia    - unclear why; possible due to Liver disease   - DIC labs  were negative         Ascites due to alcoholic cirrhosis    - IR paracentesis ordered         Acute blood loss anemia    - unclear etiology  - transfused 2 units of PRBC 1/22, responded well  - CBC q 8 hrs   - EGD negative for source of bleed  - re-checking DIC labs   - getting CT ab/pelv to r/o retroperitoneal hematoma  - EGD/Colonoscopy revealed small varices, hemorrhoids and cecum polyp.         VICKY (acute kidney injury)    - Creatinine improving   - unclear currently, but possibly due to blood loss  - Cr stable at 1.6, improved from yesterday however baseline of 1.2  - cont albumin and octreotide;   - monitor after blood transfusions         Impetigo    Cellulitis to B/L LE extremities due to suspected staph and strep species   Wound care, no need for IV Vancomycin         Coronary artery disease involving native coronary artery of native heart without angina pectoris    S/p PCI 2/2017.  Took dual antiplatelet therapy x 1 month afterwards, but just on aspirin, statin, and beta-blocker now.    Repeat Stress test on 01/26/18          Anemia of chronic disease    CBC q 12 hrs  Transfused 2 units of PRBCs  on 1/22  Transfuse if less than 7  Colonoscopy revealed internal and external hemorrhoids           Hypoalbuminemia    - PAB; boost        Cirrhosis of liver without ascites    MELD-Na score: 20 at 1/25/2018  5:23 AM  MELD score: 19 at 1/25/2018  5:23 AM  Calculated from:  Serum Creatinine: 1.6 mg/dL at 1/25/2018  5:23 AM  Serum Sodium: 136 mmol/L at 1/25/2018  5:23 AM  Total Bilirubin: 2.2 mg/dL at 1/25/2018  5:23 AM  INR(ratio): 1.6 at 1/25/2018  5:23 AM  Age: 62 years   Cont PPI  EGD revealed small varices         Seizures    - cont keppra        Bilateral edema of lower extremity    Due to liver failure and hypoalbuminemia with venous stasis ulcers  - wound care consult           Hepatic encephalopathy    - patient still seems a little confused and lethargic; ammonia elevated; had 1 BM yesterday, had one this AM; will increase lactulose to 60 g PO q4; cont rifaximin and add zinc; may need enema later; needs 3 to 4 BMs daily          Diastolic dysfunction    - chronic; holding on diuretics due to VICKY  -TTE: normal EF and diastolic function                VTE Risk Mitigation         Ordered     Reason for No Pharmacological VTE Prophylaxis  Once      01/21/18 0418     Reason for no Mechanical VTE Prophylaxis  Once      01/21/18 0418     Medium Risk of VTE  Once      01/21/18 0418           Discharge Disposition: pending stress echo and liver transplant evaluation     Time taken to evaluate, plan, and coordinate patient care: 35 minutes.    Abdi Lowry MD  Department of Hospital Medicine  [facility]

## 2018-01-26 NOTE — SUBJECTIVE & OBJECTIVE
Interval History: Patient seen and examined at bedside, no acute events overnight. Awaiting Dobutamine stress test today.     Review of Systems   Constitutional: Negative for activity change and appetite change.   HENT: Negative for drooling and facial swelling.    Eyes: Negative for discharge and itching.   Respiratory: Negative for cough, shortness of breath and wheezing.    Cardiovascular: Negative for chest pain and palpitations.   Gastrointestinal: Negative for abdominal pain and nausea.   Genitourinary: Negative for difficulty urinating and dysuria.   Skin: Negative for color change and pallor.   Neurological: Negative for dizziness and numbness.   Psychiatric/Behavioral: Negative for behavioral problems and confusion.     Objective:     Vital Signs (Most Recent):  Temp: 98.1 °F (36.7 °C) (01/26/18 1135)  Pulse: 90 (01/26/18 1135)  Resp: 16 (01/26/18 1135)  BP: (!) 129/58 (01/26/18 1135)  SpO2: 97 % (01/26/18 1135) Vital Signs (24h Range):  Temp:  [97.5 °F (36.4 °C)-99.1 °F (37.3 °C)] 98.1 °F (36.7 °C)  Pulse:  [] 90  Resp:  [16-20] 16  SpO2:  [95 %-100 %] 97 %  BP: (103-140)/(38-62) 129/58     Weight: (!) 143.9 kg (317 lb 3.9 oz)  Body mass index is 40.73 kg/m².    Intake/Output Summary (Last 24 hours) at 01/26/18 1218  Last data filed at 01/26/18 0500   Gross per 24 hour   Intake              180 ml   Output              750 ml   Net             -570 ml      Physical Exam   Constitutional: He is oriented to person, place, and time. He appears well-developed and well-nourished.   HENT:   Head: Normocephalic and atraumatic.   Eyes: Conjunctivae are normal. Pupils are equal, round, and reactive to light. Scleral icterus is present.   Neck: Normal range of motion. No thyromegaly present.   Cardiovascular: Normal rate, regular rhythm and normal heart sounds.    Pulmonary/Chest: Effort normal and breath sounds normal.   Abdominal: Soft. He exhibits distension. There is no tenderness.   Neurological: He is  alert and oriented to person, place, and time. Coordination normal.   Skin: No rash noted. No erythema.       Significant Labs:   CBC:     Recent Labs  Lab 01/25/18  0818 01/25/18  1743 01/26/18  1038   WBC 2.36* 2.33* 1.51*   HGB 8.2* 8.5* 7.3*   HCT 24.4* 25.3* 21.4*   PLT 32* 48* 38*     CMP:     Recent Labs  Lab 01/25/18  0523 01/26/18  0455    136   K 4.2 4.1    113*   CO2 20* 19*   GLU 98 128*   BUN 12 12   CREATININE 1.6* 1.5*   CALCIUM 8.1* 7.6*   PROT 4.9* 4.3*   ALBUMIN 2.4* 2.1*   BILITOT 2.2* 1.7*   ALKPHOS 73 79   AST 42* 55*   ALT 26 26   ANIONGAP 6* 4*   EGFRNONAA 45.8* 49.5*     Coagulation:     Recent Labs  Lab 01/26/18  0455   INR 1.6*     Lactic Acid: No results for input(s): LACTATE in the last 48 hours.    Significant Imaging: I have reviewed and interpreted all pertinent imaging results/findings within the past 24 hours.

## 2018-01-26 NOTE — ASSESSMENT & PLAN NOTE
- Creatinine improving   - unclear currently, but possibly due to blood loss  - Cr stable at 1.6, improved from yesterday however baseline of 1.2  - cont albumin and octreotide;   - monitor after blood transfusions

## 2018-01-26 NOTE — SUBJECTIVE & OBJECTIVE
Interval History:   1/25 EGD/colonoscopy - small varices. Small polyp. Not resected.  Awaiting stress test.   No new complaints.      Current Facility-Administered Medications   Medication    0.9%  NaCl infusion (for blood administration)    atorvastatin tablet 40 mg    dextrose 50% injection 12.5 g    dextrose 50% injection 25 g    docusate sodium capsule 250 mg    ferrous sulfate EC tablet 325 mg    glucagon (human recombinant) injection 1 mg    glucose chewable tablet 16 g    glucose chewable tablet 24 g    insulin aspart pen 0-5 Units    lactulose 20 gram/30 mL solution Soln 30 g    levETIRAcetam tablet 500 mg    magnesium oxide tablet 400 mg    ondansetron injection 4 mg    pantoprazole EC tablet 40 mg    rifAXIMin tablet 550 mg    sodium chloride 0.9% flush 3 mL    sodium chloride 0.9% flush 5 mL       Objective:     Vital Signs (Most Recent):  Temp: 98.1 °F (36.7 °C) (01/26/18 1507)  Pulse: 103 (01/26/18 1520)  Resp: 18 (01/26/18 1507)  BP: 137/64 (01/26/18 1507)  SpO2: 99 % (01/26/18 1507) Vital Signs (24h Range):  Temp:  [97.9 °F (36.6 °C)-99.1 °F (37.3 °C)] 98.1 °F (36.7 °C)  Pulse:  [] 103  Resp:  [16-20] 18  SpO2:  [96 %-99 %] 99 %  BP: (125-137)/(58-64) 137/64     Weight: (!) 143.9 kg (317 lb 3.9 oz) (01/26/18 0400)  Body mass index is 40.73 kg/m².    Physical Exam   Constitutional: He is oriented to person, place, and time. He appears ill.   Eyes: No scleral icterus.   Cardiovascular: Normal rate.  Exam reveals no friction rub.    Pulmonary/Chest: Effort normal. No respiratory distress.   Abdominal: Soft. Bowel sounds are normal. He exhibits distension. There is no tenderness. There is no rebound and no guarding.   Musculoskeletal: He exhibits edema.   Neurological: He is alert and oriented to person, place, and time.       MELD-Na score: 19 at 1/26/2018  4:55 AM  MELD score: 18 at 1/26/2018  4:55 AM  Calculated from:  Serum Creatinine: 1.5 mg/dL at 1/26/2018  4:55 AM  Serum  Sodium: 136 mmol/L at 1/26/2018  4:55 AM  Total Bilirubin: 1.7 mg/dL at 1/26/2018  4:55 AM  INR(ratio): 1.6 at 1/26/2018  4:55 AM  Age: 62 years    Lab Results   Component Value Date    WBC 1.51 (LL) 01/26/2018    HGB 7.3 (L) 01/26/2018    HCT 21.4 (L) 01/26/2018     (H) 01/26/2018    PLT 38 (LL) 01/26/2018     Lab Results   Component Value Date    INR 1.6 (H) 01/26/2018     Lab Results   Component Value Date     01/26/2018    K 4.1 01/26/2018    CREATININE 1.5 (H) 01/26/2018     Lab Results   Component Value Date    ALBUMIN 2.1 (L) 01/26/2018    ALT 26 01/26/2018    AST 55 (H) 01/26/2018    GGT 95 (H) 10/29/2015    ALKPHOS 79 01/26/2018    BILITOT 1.7 (H) 01/26/2018     Lab Results   Component Value Date    AFP 5.3 04/21/2017     Lab Results   Component Value Date    LIPASE 190 (H) 01/20/2018     No results found for: TACROLIMUS    Imaging:  Reviewed and as noted in HPI.

## 2018-01-26 NOTE — ASSESSMENT & PLAN NOTE
CBC q 12 hrs  Transfused 2 units of PRBCs on 1/22  Transfuse if less than 7  Colonoscopy revealed internal and external hemorrhoids

## 2018-01-26 NOTE — ASSESSMENT & PLAN NOTE
S/p PCI 2/2017.  Took dual antiplatelet therapy x 1 month afterwards, but just on aspirin, statin, and beta-blocker now.    Repeat Stress test on 01/26/18

## 2018-01-26 NOTE — PROGRESS NOTES
Patient verified by 2 identifiers and allergies reviewed.  20g IV placed to Lt FA.  Denies previous reactions to blood transfusions.  DSE & Optison consents obtained.  3cc Optison administered, echo images obtained, DSE testing completed.  Pt tolerated testing well. IV flushed post testing.  Post study discharge instructions reviewed with patient, patient verbalized understanding.  Patient transferred back to room via stretcher accompanied by escort in stable condition.

## 2018-01-26 NOTE — PLAN OF CARE
01/26/18 1257   Discharge Reassessment   Assessment Type Discharge Planning Reassessment   Discharge plan remains the same: Yes   Provided patient/caregiver education on the expected discharge date and the discharge plan Yes   Discharge Plan A Home with family   Discharge Plan B Home Health;Home with family   Change in patient condition or support system No

## 2018-01-26 NOTE — PROGRESS NOTES
"[facility] Hospital Medicine  Progress Note    Patient Name: Alon Adamson  MRN: [id number]  Patient Class: IP- Inpatient   Admission Date: 1/20/2018  Length of Stay: 5 days  Expected Discharge Date: 1/29/2018  Attending Physician: Abdi Lowry MD  Primary Care Provider: Mckinley Vides MD  [place] [facility] Medicine Team: [facility]  Abdi Lowry MD  Principal Problem:Decompensated hepatic cirrhosis    Subjective:     HPI:   62M with cirrhosis presents with recent worsening of abdominal distension and lower extremity edema over the past 1-2 weeks.  Previously his diuretic regimen of lasix 40mg bid and spironolactone 150mg bid was effective in keeping excess fluid off, and he has never been this edematous before.  Other than this he denies any other complaints.  Upon more targeted questioning he admits that about 2 weeks ago his venous stasis ulcers on his shins were more warm and erythematous, and he had some drainage from wounds on his right shin.  He thinks he may have had some fevers then, but those have since resolved and the redness is improved.  His son has been bandaging the wounds and applying silvadene to them.  He does admit to being a bit more fatigued lately.  Appetite has been non-existent for "years" but he makes himself eat regularly.  He denies any hypoglycemic episodes.  He has nearly completed transplant workup which was deferred in 12/2016 for a positive nuclear stress test, for which he underwent PCI and stenting in 2/2017.  Re-presentation to selection committee is pending follow-up repeat stress test which has yet to be completed due to scheduling conflicts on account of family issues.  He has not experienced any cardiovascular symptoms.      Hospital Course:  63 yo male with alcoholic liver cirrhosis who came in on sunday with AYALA and new abdominal scrotum swelling; patient was being worked up as an outpatient, was found to have a coronary lesion that was stented last " year with bare metal stent and completed plavix; was suppose to get dobutamine stress test to re-evaluate, however has missed several appointments, so may need to get it done on this admission; Patient has B/L LE cellulitis and statsis ulcer, improving on Vanc; found to have a drop in his hemoglobin from 10.6 one month ago to 8 yesterday; stated having black stools several weeks back, but none recently; I started him on protonix and octreotide anyways, this morning his hemoglobin and all his counts dropped; DIC and hemolysis labs were negative; hemoglobin was 5.7 and multiple checks, patient pale; stat 2 units PRBC ordered; went for EGD which was negative for source of bleed; got a CT ab/pelv which was negative for retroperitoneal bleed; likely will need C-scope plus minus pill; abdomen US showed large ascites and possible PVT, paracentesis done today with 2.7 fluid removal, negative for SBP; can not diurese due to VICKY, Cr went from baseline of 1.2, to 1.8 and was given albumin and then blood, eventually needs CT ab/pelv triple phase to evaluate PVT. Hemoglobin has remained stable after transfusion. EGD/Colonoscopy performed on 1/25, found polyps, hemorroids and small varices. Dobutamine stress test ordered for 01/26.       Interval History: Patient seen and examined at bedside, no acute events overnight. Awaiting Dobutamine stress test today.     Review of Systems   Constitutional: Negative for activity change and appetite change.   HENT: Negative for drooling and facial swelling.    Eyes: Negative for discharge and itching.   Respiratory: Negative for cough, shortness of breath and wheezing.    Cardiovascular: Negative for chest pain and palpitations.   Gastrointestinal: Negative for abdominal pain and nausea.   Genitourinary: Negative for difficulty urinating and dysuria.   Skin: Negative for color change and pallor.   Neurological: Negative for dizziness and numbness.   Psychiatric/Behavioral: Negative for  behavioral problems and confusion.     Objective:     Vital Signs (Most Recent):  Temp: 98.1 °F (36.7 °C) (01/26/18 1135)  Pulse: 90 (01/26/18 1135)  Resp: 16 (01/26/18 1135)  BP: (!) 129/58 (01/26/18 1135)  SpO2: 97 % (01/26/18 1135) Vital Signs (24h Range):  Temp:  [97.5 °F (36.4 °C)-99.1 °F (37.3 °C)] 98.1 °F (36.7 °C)  Pulse:  [] 90  Resp:  [16-20] 16  SpO2:  [95 %-100 %] 97 %  BP: (103-140)/(38-62) 129/58     Weight: (!) 143.9 kg (317 lb 3.9 oz)  Body mass index is 40.73 kg/m².    Intake/Output Summary (Last 24 hours) at 01/26/18 1218  Last data filed at 01/26/18 0500   Gross per 24 hour   Intake              180 ml   Output              750 ml   Net             -570 ml      Physical Exam   Constitutional: He is oriented to person, place, and time. He appears well-developed and well-nourished.   HENT:   Head: Normocephalic and atraumatic.   Eyes: Conjunctivae are normal. Pupils are equal, round, and reactive to light. Scleral icterus is present.   Neck: Normal range of motion. No thyromegaly present.   Cardiovascular: Normal rate, regular rhythm and normal heart sounds.    Pulmonary/Chest: Effort normal and breath sounds normal.   Abdominal: Soft. He exhibits distension. There is no tenderness.   Neurological: He is alert and oriented to person, place, and time. Coordination normal.   Skin: No rash noted. No erythema.       Significant Labs:   CBC:     Recent Labs  Lab 01/25/18  0818 01/25/18  1743 01/26/18  1038   WBC 2.36* 2.33* 1.51*   HGB 8.2* 8.5* 7.3*   HCT 24.4* 25.3* 21.4*   PLT 32* 48* 38*     CMP:     Recent Labs  Lab 01/25/18  0523 01/26/18  0455    136   K 4.2 4.1    113*   CO2 20* 19*   GLU 98 128*   BUN 12 12   CREATININE 1.6* 1.5*   CALCIUM 8.1* 7.6*   PROT 4.9* 4.3*   ALBUMIN 2.4* 2.1*   BILITOT 2.2* 1.7*   ALKPHOS 73 79   AST 42* 55*   ALT 26 26   ANIONGAP 6* 4*   EGFRNONAA 45.8* 49.5*     Coagulation:     Recent Labs  Lab 01/26/18  0455   INR 1.6*     Lactic Acid: No results  for input(s): LACTATE in the last 48 hours.    Significant Imaging: I have reviewed and interpreted all pertinent imaging results/findings within the past 24 hours.    Assessment / Plan:     * Decompensated hepatic cirrhosis    # Alcoholic cirrhosis with ascites  MELD-Na score: 20 at 1/25/2018  5:23 AM  MELD score: 19 at 1/25/2018  5:23 AM  Calculated from:  Serum Creatinine: 1.6 mg/dL at 1/25/2018  5:23 AM  Serum Sodium: 136 mmol/L at 1/25/2018  5:23 AM  Total Bilirubin: 2.2 mg/dL at 1/25/2018  5:23 AM  INR(ratio): 1.6 at 1/25/2018  5:23 AM  Age: 62 years   - hepatology consult  - outpatient eval on hold until dobutamine stress test done, may need to initiate inpatient eval             Pancytopenia    - unclear why; possible due to Liver disease   - DIC labs  were negative         Ascites due to alcoholic cirrhosis    - IR paracentesis ordered         Acute blood loss anemia    - unclear etiology  - transfused 2 units of PRBC 1/22, responded well  - CBC q 8 hrs   - EGD negative for source of bleed  - re-checking DIC labs   - getting CT ab/pelv to r/o retroperitoneal hematoma  - EGD/Colonoscopy revealed small varices, hemorrhoids and cecum polyp.         VICKY (acute kidney injury)    - Creatinine improving   - unclear currently, but possibly due to blood loss  - Cr stable at 1.6, improved from yesterday however baseline of 1.2  - cont albumin and octreotide;   - monitor after blood transfusions         Impetigo    Cellulitis to B/L LE extremities due to suspected staph and strep species   Wound care, no need for IV Vancomycin         Coronary artery disease involving native coronary artery of native heart without angina pectoris    S/p PCI 2/2017.  Took dual antiplatelet therapy x 1 month afterwards, but just on aspirin, statin, and beta-blocker now.    Repeat Stress test on 01/26/18          Anemia of chronic disease    CBC q 12 hrs  Transfused 2 units of PRBCs on 1/22  Transfuse if less than 7  Colonoscopy revealed  internal and external hemorrhoids           Hypoalbuminemia    - PAB; boost        Cirrhosis of liver without ascites    MELD-Na score: 20 at 1/25/2018  5:23 AM  MELD score: 19 at 1/25/2018  5:23 AM  Calculated from:  Serum Creatinine: 1.6 mg/dL at 1/25/2018  5:23 AM  Serum Sodium: 136 mmol/L at 1/25/2018  5:23 AM  Total Bilirubin: 2.2 mg/dL at 1/25/2018  5:23 AM  INR(ratio): 1.6 at 1/25/2018  5:23 AM  Age: 62 years   Cont PPI  EGD revealed small varices         Seizures    - cont keppra        Bilateral edema of lower extremity    Due to liver failure and hypoalbuminemia with venous stasis ulcers  - wound care consult           Hepatic encephalopathy    - patient still seems a little confused and lethargic; ammonia elevated; had 1 BM yesterday, had one this AM; will increase lactulose to 60 g PO q4; cont rifaximin and add zinc; may need enema later; needs 3 to 4 BMs daily          Diastolic dysfunction    - chronic; holding on diuretics due to VICKY  -TTE: normal EF and diastolic function                VTE Risk Mitigation         Ordered     Reason for No Pharmacological VTE Prophylaxis  Once      01/21/18 0418     Reason for no Mechanical VTE Prophylaxis  Once      01/21/18 0418     Medium Risk of VTE  Once      01/21/18 0418           Discharge Disposition: Pending Liver transplant workup and presentation    Time taken to evaluate, plan, and coordinate patient care:35 minutes.    Abdi Lowry MD  Department of Hospital Medicine  [facility]

## 2018-01-26 NOTE — PROGRESS NOTES
[facility]  Hepatology  Progress Note    Patient Name: Alon Adamson  MRN: [id number]  Admission Date: 1/20/2018  Hospital Length of Stay: 5 days  Attending Provider: Abdi Lowyr MD   Primary Care Physician: Mckinley Vides MD  Principal Problem:Decompensated hepatic cirrhosis    Subjective:     Transplant status: No    HPI: This is a 63 y/o male with hx of etoh cirrhosis complicated by LE edema, hepatic encephalopathy (followed by Dr. García, splenic artery aneurysm s/p coiling in 2016, deferred for transplant eval currently due to concern for CAD pending dobutamine stress test),  who presents with scrotal and abdominal swelling x 1 week. Assoc with SOB and AYALA. He has noted some dark stools intermittently. Has had worsening redness and weeping over his bilatearl shins.   Initially presented to [facility] and was directed to [facility] for further care.   Admit to taking his lasix 40mg BID and aldactone 150mg bid as well    Interval History:   1/25 EGD/colonoscopy - small varices. Small polyp. Not resected.  Awaiting stress test.   No new complaints.      Current Facility-Administered Medications   Medication    0.9%  NaCl infusion (for blood administration)    atorvastatin tablet 40 mg    dextrose 50% injection 12.5 g    dextrose 50% injection 25 g    docusate sodium capsule 250 mg    ferrous sulfate EC tablet 325 mg    glucagon (human recombinant) injection 1 mg    glucose chewable tablet 16 g    glucose chewable tablet 24 g    insulin aspart pen 0-5 Units    lactulose 20 gram/30 mL solution Soln 30 g    levETIRAcetam tablet 500 mg    magnesium oxide tablet 400 mg    ondansetron injection 4 mg    pantoprazole EC tablet 40 mg    rifAXIMin tablet 550 mg    sodium chloride 0.9% flush 3 mL    sodium chloride 0.9% flush 5 mL       Objective:     Vital Signs (Most Recent):  Temp: 98.1 °F (36.7 °C) (01/26/18 1507)  Pulse: 103 (01/26/18 1520)  Resp: 18 (01/26/18 1507)  BP: 137/64  (01/26/18 1507)  SpO2: 99 % (01/26/18 1507) Vital Signs (24h Range):  Temp:  [97.9 °F (36.6 °C)-99.1 °F (37.3 °C)] 98.1 °F (36.7 °C)  Pulse:  [] 103  Resp:  [16-20] 18  SpO2:  [96 %-99 %] 99 %  BP: (125-137)/(58-64) 137/64     Weight: (!) 143.9 kg (317 lb 3.9 oz) (01/26/18 0400)  Body mass index is 40.73 kg/m².    Physical Exam   Constitutional: He is oriented to person, place, and time. He appears ill.   Eyes: No scleral icterus.   Cardiovascular: Normal rate.  Exam reveals no friction rub.    Pulmonary/Chest: Effort normal. No respiratory distress.   Abdominal: Soft. Bowel sounds are normal. He exhibits distension. There is no tenderness. There is no rebound and no guarding.   Musculoskeletal: He exhibits edema.   Neurological: He is alert and oriented to person, place, and time.       MELD-Na score: 19 at 1/26/2018  4:55 AM  MELD score: 18 at 1/26/2018  4:55 AM  Calculated from:  Serum Creatinine: 1.5 mg/dL at 1/26/2018  4:55 AM  Serum Sodium: 136 mmol/L at 1/26/2018  4:55 AM  Total Bilirubin: 1.7 mg/dL at 1/26/2018  4:55 AM  INR(ratio): 1.6 at 1/26/2018  4:55 AM  Age: 62 years    Lab Results   Component Value Date    WBC 1.51 (LL) 01/26/2018    HGB 7.3 (L) 01/26/2018    HCT 21.4 (L) 01/26/2018     (H) 01/26/2018    PLT 38 (LL) 01/26/2018     Lab Results   Component Value Date    INR 1.6 (H) 01/26/2018     Lab Results   Component Value Date     01/26/2018    K 4.1 01/26/2018    CREATININE 1.5 (H) 01/26/2018     Lab Results   Component Value Date    ALBUMIN 2.1 (L) 01/26/2018    ALT 26 01/26/2018    AST 55 (H) 01/26/2018    GGT 95 (H) 10/29/2015    ALKPHOS 79 01/26/2018    BILITOT 1.7 (H) 01/26/2018     Lab Results   Component Value Date    AFP 5.3 04/21/2017     Lab Results   Component Value Date    LIPASE 190 (H) 01/20/2018     No results found for: TACROLIMUS    Imaging:  Reviewed and as noted in HPI.         Assessment/Plan:     Cirrhosis of liver without ascites    Hx of Etoh cirrhosis  undergoing transplant eval who is admitted with volume overload on 1/20/2018.   Currently being treated for with SOB , LE edema and cellulitis.   Some concern on labs including anemia - negative w/u with EGD, colon and CT scan  Negative PETH 1/21.    Recs:  Awaiting stress test to complete eval    Continue Lactulose and rifaximin            Thank you for your consult. I will follow-up with patient. Please contact us if you have any additional questions.    Adriano Medina MD  Hepatology  [facility]

## 2018-01-27 VITALS
HEART RATE: 74 BPM | HEIGHT: 74 IN | SYSTOLIC BLOOD PRESSURE: 127 MMHG | WEIGHT: 315 LBS | TEMPERATURE: 98 F | RESPIRATION RATE: 20 BRPM | DIASTOLIC BLOOD PRESSURE: 60 MMHG | BODY MASS INDEX: 40.43 KG/M2 | OXYGEN SATURATION: 98 %

## 2018-01-27 LAB
ALBUMIN SERPL BCP-MCNC: 2.3 G/DL
ALP SERPL-CCNC: 82 U/L
ALT SERPL W/O P-5'-P-CCNC: 32 U/L
ANION GAP SERPL CALC-SCNC: 6 MMOL/L
AST SERPL-CCNC: 68 U/L
BASOPHILS # BLD AUTO: 0.03 K/UL
BASOPHILS NFR BLD: 1.4 %
BILIRUB SERPL-MCNC: 1.9 MG/DL
BUN SERPL-MCNC: 11 MG/DL
CALCIUM SERPL-MCNC: 7.9 MG/DL
CHLORIDE SERPL-SCNC: 113 MMOL/L
CO2 SERPL-SCNC: 17 MMOL/L
CREAT SERPL-MCNC: 1.5 MG/DL
DIFFERENTIAL METHOD: ABNORMAL
EOSINOPHIL # BLD AUTO: 0.1 K/UL
EOSINOPHIL NFR BLD: 4.2 %
ERYTHROCYTE [DISTWIDTH] IN BLOOD BY AUTOMATED COUNT: 15.1 %
EST. GFR  (AFRICAN AMERICAN): 57.3 ML/MIN/1.73 M^2
EST. GFR  (NON AFRICAN AMERICAN): 49.5 ML/MIN/1.73 M^2
GLUCOSE SERPL-MCNC: 138 MG/DL
HCT VFR BLD AUTO: 25.4 %
HGB BLD-MCNC: 8.5 G/DL
IMM GRANULOCYTES # BLD AUTO: 0.01 K/UL
IMM GRANULOCYTES NFR BLD AUTO: 0.5 %
INR PPP: 1.4
LYMPHOCYTES # BLD AUTO: 0.4 K/UL
LYMPHOCYTES NFR BLD: 20.3 %
MAGNESIUM SERPL-MCNC: 2.3 MG/DL
MCH RBC QN AUTO: 34 PG
MCHC RBC AUTO-ENTMCNC: 33.5 G/DL
MCV RBC AUTO: 102 FL
MONOCYTES # BLD AUTO: 0.4 K/UL
MONOCYTES NFR BLD: 18.4 %
NEUTROPHILS # BLD AUTO: 1.2 K/UL
NEUTROPHILS NFR BLD: 55.2 %
NRBC BLD-RTO: 0 /100 WBC
PLATELET # BLD AUTO: 41 K/UL
PMV BLD AUTO: 11.7 FL
POTASSIUM SERPL-SCNC: 4 MMOL/L
PROT SERPL-MCNC: 4.9 G/DL
PROTHROMBIN TIME: 14.2 SEC
RBC # BLD AUTO: 2.5 M/UL
SODIUM SERPL-SCNC: 136 MMOL/L
WBC # BLD AUTO: 2.12 K/UL

## 2018-01-27 PROCEDURE — 85025 COMPLETE CBC W/AUTO DIFF WBC: CPT | Mod: NTX

## 2018-01-27 PROCEDURE — 25000003 PHARM REV CODE 250: Mod: NTX | Performed by: HOSPITALIST

## 2018-01-27 PROCEDURE — 83735 ASSAY OF MAGNESIUM: CPT | Mod: NTX

## 2018-01-27 PROCEDURE — 99233 SBSQ HOSP IP/OBS HIGH 50: CPT | Mod: NTX,,, | Performed by: INTERNAL MEDICINE

## 2018-01-27 PROCEDURE — 97116 GAIT TRAINING THERAPY: CPT | Mod: NTX

## 2018-01-27 PROCEDURE — 80053 COMPREHEN METABOLIC PANEL: CPT | Mod: NTX

## 2018-01-27 PROCEDURE — 85610 PROTHROMBIN TIME: CPT | Mod: NTX

## 2018-01-27 PROCEDURE — 99238 HOSP IP/OBS DSCHRG MGMT 30/<: CPT | Mod: NTX,,, | Performed by: HOSPITALIST

## 2018-01-27 PROCEDURE — 36415 COLL VENOUS BLD VENIPUNCTURE: CPT | Mod: NTX

## 2018-01-27 RX ORDER — PANTOPRAZOLE SODIUM 40 MG/1
40 TABLET, DELAYED RELEASE ORAL
Qty: 60 TABLET | Refills: 11 | Status: ON HOLD | OUTPATIENT
Start: 2018-01-27 | End: 2018-08-17 | Stop reason: SDUPTHER

## 2018-01-27 RX ORDER — ASPIRIN 81 MG/1
81 TABLET ORAL DAILY
Qty: 90 TABLET | Refills: 3 | Status: SHIPPED | OUTPATIENT
Start: 2018-01-27 | End: 2020-07-15

## 2018-01-27 RX ORDER — SPIRONOLACTONE 100 MG/1
100 TABLET, FILM COATED ORAL 2 TIMES DAILY
Qty: 270 TABLET | Refills: 1
Start: 2018-01-27 | End: 2018-02-16 | Stop reason: SDUPTHER

## 2018-01-27 RX ADMIN — LEVETIRACETAM 500 MG: 500 TABLET ORAL at 08:01

## 2018-01-27 RX ADMIN — RIFAXIMIN 550 MG: 550 TABLET ORAL at 08:01

## 2018-01-27 RX ADMIN — ATORVASTATIN CALCIUM 40 MG: 20 TABLET, FILM COATED ORAL at 08:01

## 2018-01-27 RX ADMIN — MAGNESIUM OXIDE TAB 400 MG (241.3 MG ELEMENTAL MG) 400 MG: 400 (241.3 MG) TAB at 08:01

## 2018-01-27 RX ADMIN — PANTOPRAZOLE SODIUM 40 MG: 40 TABLET, DELAYED RELEASE ORAL at 05:01

## 2018-01-27 RX ADMIN — LACTULOSE 30 G: 20 SOLUTION ORAL at 05:01

## 2018-01-27 RX ADMIN — FERROUS SULFATE TAB EC 325 MG (65 MG FE EQUIVALENT) 325 MG: 325 (65 FE) TABLET DELAYED RESPONSE at 08:01

## 2018-01-27 NOTE — SUBJECTIVE & OBJECTIVE
Interval History:   1/25 EGD/colonoscopy - small varices. Small polyp. Not resected.  Negative stress test  No new complaints.      Current Facility-Administered Medications   Medication    0.9%  NaCl infusion (for blood administration)    atorvastatin tablet 40 mg    dextrose 50% injection 12.5 g    dextrose 50% injection 25 g    docusate sodium capsule 250 mg    ferrous sulfate EC tablet 325 mg    glucagon (human recombinant) injection 1 mg    glucose chewable tablet 16 g    glucose chewable tablet 24 g    insulin aspart pen 0-5 Units    lactulose 20 gram/30 mL solution Soln 30 g    levETIRAcetam tablet 500 mg    magnesium oxide tablet 400 mg    ondansetron injection 4 mg    pantoprazole EC tablet 40 mg    rifAXIMin tablet 550 mg    sodium chloride 0.9% flush 3 mL    sodium chloride 0.9% flush 5 mL     Current Outpatient Prescriptions   Medication Sig    ascorbic acid (VITAMIN C) 1000 MG tablet Take 1,000 mg by mouth once daily.    atorvastatin (LIPITOR) 40 MG tablet TAKE 1 TABLET(40 MG) BY MOUTH EVERY DAY    docusate sodium (COLACE) 100 MG capsule Take 100 mg by mouth once daily.     ferrous gluconate 270 mg (27 mg iron) Tab Take 1 tablet by mouth once daily.    fish oil-omega-3 fatty acids 300-1,000 mg capsule Take 2 capsules (2 g total) by mouth once daily. (Patient taking differently: Take 2 g by mouth 2 (two) times daily. )    furosemide (LASIX) 40 MG tablet Take 1 tablet (40 mg total) by mouth 2 (two) times daily.    GENERLAC 10 gram/15 mL solution Take 60 mLs (40 g total) by mouth 3 (three) times daily. 60mg three times per day (Patient taking differently: Take 60 mLs by mouth 3 (three) times daily. )    levETIRAcetam (KEPPRA) 500 MG Tab Take 1 tablet (500 mg total) by mouth 2 (two) times daily.    magnesium oxide (MAG-OX) 400 mg tablet Take 1 tablet (400 mg total) by mouth once daily.    ondansetron (ZOFRAN) 4 MG tablet Take 1 tablet (4 mg total) by mouth every 8 (eight) hours  as needed for Nausea.    rifAXIMin (XIFAXAN) 550 mg Tab Take 1 tablet (550 mg total) by mouth 2 (two) times daily.    tramadol (ULTRAM) 50 mg tablet Take 50 mg by mouth every 6 (six) hours as needed for Pain.    aspirin (ECOTRIN) 81 MG EC tablet Take 1 tablet (81 mg total) by mouth once daily.    pantoprazole (PROTONIX) 40 MG tablet Take 1 tablet (40 mg total) by mouth 2 (two) times daily before meals.    spironolactone (ALDACTONE) 100 MG tablet Take 1 tablet (100 mg total) by mouth 2 (two) times daily.       Objective:     Vital Signs (Most Recent):  Temp: 97.8 °F (36.6 °C) (01/27/18 1123)  Pulse: 74 (01/27/18 1446)  Resp: 20 (01/27/18 1123)  BP: 127/60 (01/27/18 1123)  SpO2: 98 % (01/27/18 1123) Vital Signs (24h Range):  Temp:  [97.8 °F (36.6 °C)-98.5 °F (36.9 °C)] 97.8 °F (36.6 °C)  Pulse:  [73-83] 74  Resp:  [16-20] 20  SpO2:  [97 %-98 %] 98 %  BP: (124-149)/(59-67) 127/60     Weight: (!) 143.8 kg (317 lb 0.3 oz) (01/27/18 0500)  Body mass index is 40.7 kg/m².    Physical Exam   Constitutional: He is oriented to person, place, and time. He appears ill.   Eyes: No scleral icterus.   Cardiovascular: Normal rate.  Exam reveals no friction rub.    Pulmonary/Chest: Effort normal. No respiratory distress.   Abdominal: Soft. Bowel sounds are normal. He exhibits distension. There is no tenderness. There is no rebound and no guarding.   Musculoskeletal: He exhibits edema.   Neurological: He is alert and oriented to person, place, and time.       MELD-Na score: 18 at 1/27/2018  9:26 AM  MELD score: 17 at 1/27/2018  9:26 AM  Calculated from:  Serum Creatinine: 1.5 mg/dL at 1/27/2018  9:26 AM  Serum Sodium: 136 mmol/L at 1/27/2018  9:26 AM  Total Bilirubin: 1.9 mg/dL at 1/27/2018  9:26 AM  INR(ratio): 1.4 at 1/27/2018  9:26 AM  Age: 62 years    Lab Results   Component Value Date    WBC 2.12 (L) 01/27/2018    HGB 8.5 (L) 01/27/2018    HCT 25.4 (L) 01/27/2018     (H) 01/27/2018    PLT 41 (L) 01/27/2018     Lab  Results   Component Value Date    INR 1.4 (H) 01/27/2018     Lab Results   Component Value Date     01/27/2018    K 4.0 01/27/2018    CREATININE 1.5 (H) 01/27/2018     Lab Results   Component Value Date    ALBUMIN 2.3 (L) 01/27/2018    ALT 32 01/27/2018    AST 68 (H) 01/27/2018    GGT 95 (H) 10/29/2015    ALKPHOS 82 01/27/2018    BILITOT 1.9 (H) 01/27/2018     Lab Results   Component Value Date    AFP 5.3 04/21/2017     Lab Results   Component Value Date    LIPASE 190 (H) 01/20/2018     No results found for: TACROLIMUS    Imaging:  Reviewed and as noted in HPI.

## 2018-01-27 NOTE — PROGRESS NOTES
Notified by lab of critical WBC 1.64 and platelets 38,000. Dr. Lowry notified of above. Also reviewed H/H 7.6/22.7. No new orders received. Serial CBC continued Q12H.

## 2018-01-27 NOTE — DISCHARGE SUMMARY
"[facility] Hospital Medicine  Discharge Summary      Patient Name: Alon Adamson  MRN: [id number]  Admission Date: 1/20/2018  Hospital Length of Stay: 6 days  Discharge Date and Time:  01/27/2018 3:14 PM  Attending Physician: Abdi Lowry MD   Discharging Provider: Abdi Lowry MD  Primary Care Provider: Mckinley Vides MD  Hospital Medicine Team: [facility] HOSP MED  Abdi Lowry MD    HPI:   62M with cirrhosis presents with recent worsening of abdominal distension and lower extremity edema over the past 1-2 weeks.  Previously his diuretic regimen of lasix 40mg bid and spironolactone 150mg bid was effective in keeping excess fluid off, and he has never been this edematous before.  Other than this he denies any other complaints.  Upon more targeted questioning he admits that about 2 weeks ago his venous stasis ulcers on his shins were more warm and erythematous, and he had some drainage from wounds on his right shin.  He thinks he may have had some fevers then, but those have since resolved and the redness is improved.  His son has been bandaging the wounds and applying silvadene to them.  He does admit to being a bit more fatigued lately.  Appetite has been non-existent for "years" but he makes himself eat regularly.  He denies any hypoglycemic episodes.  He has nearly completed transplant workup which was deferred in 12/2016 for a positive nuclear stress test, for which he underwent PCI and stenting in 2/2017.  Re-presentation to selection committee is pending follow-up repeat stress test which has yet to be completed due to scheduling conflicts on account of family issues.  He has not experienced any cardiovascular symptoms.      Procedure(s) (LRB):  ESOPHAGOGASTRODUODENOSCOPY (EGD) (N/A)  COLONOSCOPY (N/A)      Hospital Course:   63 yo male with alcoholic liver cirrhosis who came in on sunday with AYALA and new abdominal scrotum swelling; patient was being worked up as an outpatient, " was found to have a coronary lesion that was stented last year with bare metal stent and completed plavix; was suppose to get dobutamine stress test to re-evaluate, however has missed several appointments, so may need to get it done on this admission; Patient has B/L LE cellulitis and statsis ulcer, improving on Vanc; found to have a drop in his hemoglobin from 10.6 one month ago to 8 yesterday; stated having black stools several weeks back, but none recently; I started him on protonix and octreotide anyways, this morning his hemoglobin and all his counts dropped; DIC and hemolysis labs were negative; hemoglobin was 5.7 and multiple checks, patient pale; stat 2 units PRBC ordered; went for EGD which was negative for source of bleed; got a CT ab/pelv which was negative for retroperitoneal bleed; likely will need C-scope plus minus pill; abdomen US showed large ascites and possible PVT, paracentesis done today with 2.7 fluid removal, negative for SBP; can not diurese due to VICKY, Cr went from baseline of 1.2, to 1.8 and was given albumin and then blood, eventually needs CT ab/pelv triple phase to evaluate PVT. Hemoglobin has remained stable after transfusion. EGD/Colonoscopy performed on 1/25, found polyps, hemorroids and small varices. Dobutamine stress test revealed no acute abnormalities. Antihyperglycemics held due to low Hgb A1c       Consults:   Consults         Status Ordering Provider     Inpatient consult to Hepatology  Once     Provider:  (Not yet assigned)    Completed WALESKA WORRELL consult to case management  Once     Provider:  (Not yet assigned)    Acknowledged DC REYNAGA          * Decompensated hepatic cirrhosis    # Alcoholic cirrhosis with ascites  MELD-Na score: 18 at 1/27/2018  9:26 AM  MELD score: 17 at 1/27/2018  9:26 AM  Calculated from:  Serum Creatinine: 1.5 mg/dL at 1/27/2018  9:26 AM  Serum Sodium: 136 mmol/L at 1/27/2018  9:26 AM  Total Bilirubin: 1.9 mg/dL at 1/27/2018   9:26 AM  INR(ratio): 1.4 at 1/27/2018  9:26 AM  Age: 62 years   - hepatology consult  - EGD/cscope performed, Stress test unremarkable.   - to be presented to selection committee             Acute blood loss anemia    - unclear etiology  - transfused 2 units of PRBC 1/22, responded well  - CBC q 8 hrs   - EGD negative for source of bleed  - re-checking DIC labs   - getting CT ab/pelv to r/o retroperitoneal hematoma  - EGD/Colonoscopy revealed small varices, hemorrhoids and cecum polyp.         VICKY (acute kidney injury)    - Creatinine improving   - unclear currently, but possibly due to blood loss  - Cr stable at 1.5 improved from yesterday however baseline of 1.2  - cont albumin and octreotide;   - monitor after blood transfusions   - as per Hepatology will resume diuretics since patient tends to get volume overloaded        Coronary artery disease involving native coronary artery of native heart without angina pectoris    S/p PCI 2/2017.  Took dual antiplatelet therapy x 1 month afterwards, but just on aspirin, statin, and beta-blocker now.    Repeat Stress test on 01/26/18 normal           Anemia of chronic disease    hh stable    Transfused 2 units of PRBCs on 1/22  Transfuse if less than 7  Colonoscopy revealed internal and external hemorrhoids           Cirrhosis of liver without ascites    MELD-Na score: 18 at 1/27/2018  9:26 AM  MELD score: 17 at 1/27/2018  9:26 AM  Calculated from:  Serum Creatinine: 1.5 mg/dL at 1/27/2018  9:26 AM  Serum Sodium: 136 mmol/L at 1/27/2018  9:26 AM  Total Bilirubin: 1.9 mg/dL at 1/27/2018  9:26 AM  INR(ratio): 1.4 at 1/27/2018  9:26 AM  Age: 62 years   Cont PPI  EGD revealed small varices         Bilateral edema of lower extremity    Due to liver failure and hypoalbuminemia with venous stasis ulcers  - cont diuretics             Final Active Diagnoses:    Diagnosis Date Noted POA    PRINCIPAL PROBLEM:  Decompensated hepatic cirrhosis [K72.90] 01/21/2018 Yes    Acute blood  loss anemia [D62] 01/22/2018 Yes    Alteration in skin integrity [R23.9] 01/22/2018 Yes    Pancytopenia [D61.818] 01/22/2018 No    Impetigo [L01.00] 01/21/2018 Yes    VICKY (acute kidney injury) [N17.9] 01/21/2018 Yes    Coronary artery disease involving native coronary artery of native heart without angina pectoris [I25.10] 03/16/2017 Yes    Hypoalbuminemia [E88.09] 08/30/2016 Yes    Anemia of chronic disease [D63.8] 08/30/2016 Yes    Cirrhosis of liver without ascites [K74.60]  Yes    Seizures [R56.9] 05/12/2016 Yes    Hepatic encephalopathy [K72.90] 02/17/2016 Yes    Bilateral edema of lower extremity [R60.0] 02/17/2016 Yes    Diastolic dysfunction [I51.9] 11/25/2015 Yes      Problems Resolved During this Admission:    Diagnosis Date Noted Date Resolved POA       Discharged Condition: stable    Disposition: Home or Self Care    Follow Up:  Follow-up Information     Mckinley Vides MD.    Specialty:  Family Medicine  Contact information:  [address] [facility]  [address] [place] [address] [place] [id number]  [phone]             Miriam García MD. Call in 3 days.    Specialties:  Transplant, Hepatology  Contact information:  [address]  [place] [id number] [phone]                 Patient Instructions:     Ambulatory referral to Transplant, Liver   Referral Priority: Routine Referral Type: Transplants   Number of Visits Requested: 1          Significant Diagnostic Studies: Labs:   CMP   Recent Labs  Lab 01/26/18  0455 01/27/18  0926    136   K 4.1 4.0   * 113*   CO2 19* 17*   * 138*   BUN 12 11   CREATININE 1.5* 1.5*   CALCIUM 7.6* 7.9*   PROT 4.3* 4.9*   ALBUMIN 2.1* 2.3*   BILITOT 1.7* 1.9*   ALKPHOS 79 82   AST 55* 68*   ALT 26 32   ANIONGAP 4* 6*   ESTGFRAFRICA 57.3* 57.3*   EGFRNONAA 49.5* 49.5*   , CBC   Recent Labs  Lab 01/26/18  1038 01/26/18  1736 01/27/18  0926   WBC 1.51* 1.67* 2.12*   HGB 7.3* 7.6* 8.5*   HCT 21.4* 22.7* 25.4*   PLT 38* 38* 41*   , INR   Lab Results   Component Value  Date    INR 1.4 (H) 01/27/2018    INR 1.6 (H) 01/26/2018    INR 1.6 (H) 01/25/2018    and A1C:   Recent Labs  Lab 01/21/18  0533   HGBA1C 4.8     Microbiology:   Blood Culture   Lab Results   Component Value Date    LABBLOO No growth after 5 days. 08/27/2016   , Sputum Culture No results found for: GSRESP, RESPIRATORYC and Urine Culture    Lab Results   Component Value Date    LABURIN No growth 01/24/2018     Radiology: X-Ray: CXR: X-Ray Chest 1 View (CXR):   Results for orders placed or performed during the hospital encounter of 01/20/18   X-Ray Chest 1 View    Narrative    Chest AP portable    Indication:.    Comparison:August 27, 2016.    Findings:     Elevated right hemidiaphragm.    The cardiomediastinal silhouette is within normal limits.  There is no pleural effusion.  The trachea is midline.  The lungs are symmetrically expanded bilaterally without evidence of acute parenchymal process.  There is no pneumothorax.  The osseous structures are unremarkable.    Impression    No acute findings in the chest.    Elevated right hemidiaphragm.        Electronically signed by: DORINDA CHAIDEZ MD  Date:     01/21/18  Time:    00:50      CT scan: CT ABDOMEN PELVIS WITH CONTRAST: No results found for this visit on 01/20/18.  Cardiac Graphics: Echocardiogram:   2D echo with color flow doppler:   Results for orders placed or performed during the hospital encounter of 01/20/18   2D echo with color flow doppler   Result Value Ref Range    EF 60 55 - 65    Diastolic Dysfunction No     Est. PA Systolic Pressure 30.03     Tricuspid Valve Regurgitation MILD     and Stress Test:    CONCLUSIONS     1 - Normal left ventricular systolic function (EF 60-65%).     2 - No wall motion abnormalities.     3 - Concentric remodeling.     4 - Severe left atrial enlargement.     5 - Normal left ventricular diastolic function.     6 - Normal right ventricular systolic function .     No evidence of stress induced myocardial ischemia.     Pending  Diagnostic Studies:     None         Medications:  Reconciled Home Medications:   Current Discharge Medication List      START taking these medications    Details   pantoprazole (PROTONIX) 40 MG tablet Take 1 tablet (40 mg total) by mouth 2 (two) times daily before meals.  Qty: 60 tablet, Refills: 11         CONTINUE these medications which have CHANGED    Details   aspirin (ECOTRIN) 81 MG EC tablet Take 1 tablet (81 mg total) by mouth once daily.  Qty: 90 tablet, Refills: 3      spironolactone (ALDACTONE) 100 MG tablet Take 1 tablet (100 mg total) by mouth 2 (two) times daily.  Qty: 270 tablet, Refills: 1    Associated Diagnoses: Bilateral leg edema; Other cirrhosis of liver         CONTINUE these medications which have NOT CHANGED    Details   ascorbic acid (VITAMIN C) 1000 MG tablet Take 1,000 mg by mouth once daily.      atorvastatin (LIPITOR) 40 MG tablet TAKE 1 TABLET(40 MG) BY MOUTH EVERY DAY  Qty: 30 tablet, Refills: 0      docusate sodium (COLACE) 100 MG capsule Take 100 mg by mouth once daily.       ferrous gluconate 270 mg (27 mg iron) Tab Take 1 tablet by mouth once daily.      fish oil-omega-3 fatty acids 300-1,000 mg capsule Take 2 capsules (2 g total) by mouth once daily.      furosemide (LASIX) 40 MG tablet Take 1 tablet (40 mg total) by mouth 2 (two) times daily.  Qty: 180 tablet, Refills: 1    Associated Diagnoses: Other cirrhosis of liver      GENERLAC 10 gram/15 mL solution Take 60 mLs (40 g total) by mouth 3 (three) times daily. 60mg three times per day  Qty: 5400 mL, Refills: 6    Associated Diagnoses: Hepatic encephalopathy      levETIRAcetam (KEPPRA) 500 MG Tab Take 1 tablet (500 mg total) by mouth 2 (two) times daily.  Qty: 180 tablet, Refills: 1    Associated Diagnoses: Seizure disorder; Non-intractable vomiting with nausea, unspecified vomiting type      magnesium oxide (MAG-OX) 400 mg tablet Take 1 tablet (400 mg total) by mouth once daily.  Refills: 0      ondansetron (ZOFRAN) 4 MG  tablet Take 1 tablet (4 mg total) by mouth every 8 (eight) hours as needed for Nausea.  Qty: 15 tablet, Refills: 3      rifAXIMin (XIFAXAN) 550 mg Tab Take 1 tablet (550 mg total) by mouth 2 (two) times daily.  Qty: 60 tablet, Refills: 5    Associated Diagnoses: Hepatic encephalopathy      tramadol (ULTRAM) 50 mg tablet Take 50 mg by mouth every 6 (six) hours as needed for Pain.         STOP taking these medications       ferrous sulfate 325 mg (65 mg iron) Tab tablet Comments:   Reason for Stopping:         glyBURIDE-metformin 2.5-500 mg (GLUCOVANCE) 2.5-500 mg per tablet Comments:   Reason for Stopping:         melatonin 3 mg Tab Comments:   Reason for Stopping:         metoprolol tartrate (LOPRESSOR) 12.5 mg tablet Comments:   Reason for Stopping:               Indwelling Lines/Drains at time of discharge:   Lines/Drains/Airways          No matching active lines, drains, or airways          Time spent on the discharge of patient: 35 minutes  Patient was seen and examined on the date of discharge and determined to be suitable for discharge.         Abdi Lowry MD  Department of Hospital Medicine  [facility]

## 2018-01-27 NOTE — PROGRESS NOTES
Pt, wife and son given dc information.  All verbalized understanding and had no questions.  PIV and tele dc'd.  denies pain.  Awaiting transport

## 2018-01-27 NOTE — PLAN OF CARE
Problem: Patient Care Overview  Goal: Plan of Care Review  Outcome: Ongoing (interventions implemented as appropriate)  Stress test completed today as part of liver transplant workup. AAO x4, on lactulose TID, 2 BM this shift. OOB to chair for a few hours today. Sween cream applied to BLE as ordered, patient states he has been applying critic aid to scrotum. Scrotum elevated on pads. Denies any pain or complaints.

## 2018-01-27 NOTE — ASSESSMENT & PLAN NOTE
S/p PCI 2/2017.  Took dual antiplatelet therapy x 1 month afterwards, but just on aspirin, statin, and beta-blocker now.    Repeat Stress test on 01/26/18 normal

## 2018-01-27 NOTE — PLAN OF CARE
Problem: Patient Care Overview  Goal: Plan of Care Review  Outcome: Ongoing (interventions implemented as appropriate)  Pt is AAOx3.Pt is ambulatory and independent.Pt able to perform all ADL's without assistance. Pt is in good spirits.  NAD noted.Standard precautions maintained.  No breakdown noted, po fluids encouraged.Pt remains injury and fall free, non skid footwear donned, call light within reach, personal items within reach, bed in low/locked position, pt able to voice needs all needs voiced have been met at this time.

## 2018-01-27 NOTE — PT/OT/SLP PROGRESS
"Physical Therapy Treatment/  DISCHARGE NOTE    Patient Name:  Alon Adamson   MRN:  [id number]    Recommendations:     Discharge Recommendations:  home   Discharge Equipment Recommendations: none   Barriers to discharge: None    Assessment:     Alon Adamson is a 62 y.o. male admitted with a medical diagnosis of Decompensated hepatic cirrhosis.  He presents with the following impairments/functional limitations:  impaired cardiopulmonary response to activity, impaired endurance.  Pt is to be discharged at this time from skilled PT services as pt is independent with all functional mobility assessed.  Pt does not require PT services following discharge from acute care.      Recent Surgery: Procedure(s) (LRB):  ESOPHAGOGASTRODUODENOSCOPY (EGD) (N/A)  COLONOSCOPY (N/A) 2 Days Post-Op    Plan:     During this hospitalization, patient to be seen 3 x/week to address the above listed problems via gait training, therapeutic activities, therapeutic exercises, neuromuscular re-education  · Plan of Care Expires:  02/21/18   Plan of Care Reviewed with: patient    Subjective     Communicated with nursing prior to session.  Patient found in supine upon PT entry to room, agreeable to treatment.      "I'd like to have them even higher." - Referring to LE elevation  Pt stated that he does not like to sit sec to the swelling in the scrotal region.    Chief Complaint: Scrotal edema  Patient comments/goals: To go home  Pain/Comfort:  · Pain Rating 1: 0/10    Patients cultural, spiritual, Latter day conflicts given the current situation: no    Objective:     Patient found with: telemetry, peripheral IV     General Precautions: Standard, fall   Orthopedic Precautions:N/A   Braces: N/A     Functional Mobility:    · Bed Mobility:     · Scooting: independence  · Bridging: independence  · Supine to Sit: independence    · Transfers:     · Sit to Stand:  independence with no AD  · Bed to Chair: independence with  no AD  using  Stand " Pivot  · Toilet Transfer: independence with  no AD  using  Stand Pivot    · Gait: Pt amb 750', I, pt able to push IV pole, without AD, no episodes of LOB, min SOB noted.    · Balance: Independent with dynamic and static standing balance    · Stairs:  Pt ascended/descended 7 stair(s) with No Assistive Device with right handrail, independently.       AM-PAC 6 CLICK MOBILITY  Turning over in bed (including adjusting bedclothes, sheets and blankets)?: 4  Sitting down on and standing up from a chair with arms (e.g., wheelchair, bedside commode, etc.): 4  Moving from lying on back to sitting on the side of the bed?: 4  Moving to and from a bed to a chair (including a wheelchair)?: 4  Need to walk in hospital room?: 4  Climbing 3-5 steps with a railing?: 4  Total Score: 24       Therapeutic Activities and Exercises:   6MWT: Pt amb 402' = 122.53m, 5/10 RPE following  Gait speed: 0.34 m/s    Patient left in bathroom with all lines intact and nursing notified..    GOALS:    Physical Therapy Goals        Problem: Physical Therapy Goal    Goal Priority Disciplines Outcome Goal Variances Interventions   Physical Therapy Goal     PT/OT, PT Unable to achieve outcome(s) by discharge     Description:  Goals to be met by: 18     Patient will increase functional independence with mobility by performin. Supine to sit with Supervision  - GOAL MET  2. Sit to stand transfer with Supervision - GOAL MET  3. Gait  x 150 feet with Supervision. - GOAL MET  4. Ascend/descend 5 stair with right Handrails Stand-by Assistance. - GOAL MET  5. Lower extremity exercise program x15 reps, with supervision, in order to increase LE strength and (I) with functional mobility                        Time Tracking:     PT Received On: 18  PT Start Time: 909     PT Stop Time: 943  PT Total Time (min): 34 min     Billable Minutes: Gait Training 25    Treatment Type: Treatment  PT/PTA: PT           Danielle Monterroso, PT  2018

## 2018-01-27 NOTE — ASSESSMENT & PLAN NOTE
# Alcoholic cirrhosis with ascites  MELD-Na score: 18 at 1/27/2018  9:26 AM  MELD score: 17 at 1/27/2018  9:26 AM  Calculated from:  Serum Creatinine: 1.5 mg/dL at 1/27/2018  9:26 AM  Serum Sodium: 136 mmol/L at 1/27/2018  9:26 AM  Total Bilirubin: 1.9 mg/dL at 1/27/2018  9:26 AM  INR(ratio): 1.4 at 1/27/2018  9:26 AM  Age: 62 years   - hepatology consult  - EGD/cscope performed, Stress test unremarkable.   - to be presented to selection committee

## 2018-01-27 NOTE — ASSESSMENT & PLAN NOTE
MELD-Na score: 18 at 1/27/2018  9:26 AM  MELD score: 17 at 1/27/2018  9:26 AM  Calculated from:  Serum Creatinine: 1.5 mg/dL at 1/27/2018  9:26 AM  Serum Sodium: 136 mmol/L at 1/27/2018  9:26 AM  Total Bilirubin: 1.9 mg/dL at 1/27/2018  9:26 AM  INR(ratio): 1.4 at 1/27/2018  9:26 AM  Age: 62 years   Cont PPI  EGD revealed small varices

## 2018-01-27 NOTE — PROGRESS NOTES
[facility]  Hepatology  Progress Note    Patient Name: Alon Adamson  MRN: [id number]  Admission Date: 1/20/2018  Hospital Length of Stay: 6 days  Attending Provider: No att. providers found   Primary Care Physician: Mckinley Vides MD  Principal Problem:Decompensated hepatic cirrhosis    Subjective:     Transplant status: Pre-transplant    HPI: This is a 63 y/o male with hx of etoh cirrhosis complicated by LE edema, hepatic encephalopathy (followed by Dr. García, splenic artery aneurysm s/p coiling in 2016, deferred for transplant eval currently due to concern for CAD pending dobutamine stress test),  who presents with scrotal and abdominal swelling x 1 week. Assoc with SOB and AYALA. He has noted some dark stools intermittently. Has had worsening redness and weeping over his bilatearl shins.   Initially presented to [facility] and was directed to [facility] for further care.   Admit to taking his lasix 40mg BID and aldactone 150mg bid as well    Interval History:   1/25 EGD/colonoscopy - small varices. Small polyp. Not resected.  Negative stress test  No new complaints.      Current Facility-Administered Medications   Medication    0.9%  NaCl infusion (for blood administration)    atorvastatin tablet 40 mg    dextrose 50% injection 12.5 g    dextrose 50% injection 25 g    docusate sodium capsule 250 mg    ferrous sulfate EC tablet 325 mg    glucagon (human recombinant) injection 1 mg    glucose chewable tablet 16 g    glucose chewable tablet 24 g    insulin aspart pen 0-5 Units    lactulose 20 gram/30 mL solution Soln 30 g    levETIRAcetam tablet 500 mg    magnesium oxide tablet 400 mg    ondansetron injection 4 mg    pantoprazole EC tablet 40 mg    rifAXIMin tablet 550 mg    sodium chloride 0.9% flush 3 mL    sodium chloride 0.9% flush 5 mL     Current Outpatient Prescriptions   Medication Sig    ascorbic acid (VITAMIN C) 1000 MG tablet Take 1,000 mg by mouth once daily.     atorvastatin (LIPITOR) 40 MG tablet TAKE 1 TABLET(40 MG) BY MOUTH EVERY DAY    docusate sodium (COLACE) 100 MG capsule Take 100 mg by mouth once daily.     ferrous gluconate 270 mg (27 mg iron) Tab Take 1 tablet by mouth once daily.    fish oil-omega-3 fatty acids 300-1,000 mg capsule Take 2 capsules (2 g total) by mouth once daily. (Patient taking differently: Take 2 g by mouth 2 (two) times daily. )    furosemide (LASIX) 40 MG tablet Take 1 tablet (40 mg total) by mouth 2 (two) times daily.    GENERLAC 10 gram/15 mL solution Take 60 mLs (40 g total) by mouth 3 (three) times daily. 60mg three times per day (Patient taking differently: Take 60 mLs by mouth 3 (three) times daily. )    levETIRAcetam (KEPPRA) 500 MG Tab Take 1 tablet (500 mg total) by mouth 2 (two) times daily.    magnesium oxide (MAG-OX) 400 mg tablet Take 1 tablet (400 mg total) by mouth once daily.    ondansetron (ZOFRAN) 4 MG tablet Take 1 tablet (4 mg total) by mouth every 8 (eight) hours as needed for Nausea.    rifAXIMin (XIFAXAN) 550 mg Tab Take 1 tablet (550 mg total) by mouth 2 (two) times daily.    tramadol (ULTRAM) 50 mg tablet Take 50 mg by mouth every 6 (six) hours as needed for Pain.    aspirin (ECOTRIN) 81 MG EC tablet Take 1 tablet (81 mg total) by mouth once daily.    pantoprazole (PROTONIX) 40 MG tablet Take 1 tablet (40 mg total) by mouth 2 (two) times daily before meals.    spironolactone (ALDACTONE) 100 MG tablet Take 1 tablet (100 mg total) by mouth 2 (two) times daily.       Objective:     Vital Signs (Most Recent):  Temp: 97.8 °F (36.6 °C) (01/27/18 1123)  Pulse: 74 (01/27/18 1446)  Resp: 20 (01/27/18 1123)  BP: 127/60 (01/27/18 1123)  SpO2: 98 % (01/27/18 1123) Vital Signs (24h Range):  Temp:  [97.8 °F (36.6 °C)-98.5 °F (36.9 °C)] 97.8 °F (36.6 °C)  Pulse:  [73-83] 74  Resp:  [16-20] 20  SpO2:  [97 %-98 %] 98 %  BP: (124-149)/(59-67) 127/60     Weight: (!) 143.8 kg (317 lb 0.3 oz) (01/27/18 0500)  Body mass index  is 40.7 kg/m².    Physical Exam   Constitutional: He is oriented to person, place, and time. He appears ill.   Eyes: No scleral icterus.   Cardiovascular: Normal rate.  Exam reveals no friction rub.    Pulmonary/Chest: Effort normal. No respiratory distress.   Abdominal: Soft. Bowel sounds are normal. He exhibits distension. There is no tenderness. There is no rebound and no guarding.   Musculoskeletal: He exhibits edema.   Neurological: He is alert and oriented to person, place, and time.       MELD-Na score: 18 at 1/27/2018  9:26 AM  MELD score: 17 at 1/27/2018  9:26 AM  Calculated from:  Serum Creatinine: 1.5 mg/dL at 1/27/2018  9:26 AM  Serum Sodium: 136 mmol/L at 1/27/2018  9:26 AM  Total Bilirubin: 1.9 mg/dL at 1/27/2018  9:26 AM  INR(ratio): 1.4 at 1/27/2018  9:26 AM  Age: 62 years    Lab Results   Component Value Date    WBC 2.12 (L) 01/27/2018    HGB 8.5 (L) 01/27/2018    HCT 25.4 (L) 01/27/2018     (H) 01/27/2018    PLT 41 (L) 01/27/2018     Lab Results   Component Value Date    INR 1.4 (H) 01/27/2018     Lab Results   Component Value Date     01/27/2018    K 4.0 01/27/2018    CREATININE 1.5 (H) 01/27/2018     Lab Results   Component Value Date    ALBUMIN 2.3 (L) 01/27/2018    ALT 32 01/27/2018    AST 68 (H) 01/27/2018    GGT 95 (H) 10/29/2015    ALKPHOS 82 01/27/2018    BILITOT 1.9 (H) 01/27/2018     Lab Results   Component Value Date    AFP 5.3 04/21/2017     Lab Results   Component Value Date    LIPASE 190 (H) 01/20/2018     No results found for: TACROLIMUS    Imaging:  Reviewed and as noted in HPI.         Assessment/Plan:     Cirrhosis of liver without ascites    Hx of Etoh cirrhosis undergoing transplant eval who is admitted with volume overload on 1/20/2018.   Currently being treated for with SOB , LE edema and cellulitis.   Some concern on labs including anemia - negative w/u with EGD, colon and CT scan  Negative PETH 1/21.  Negative stress test - 1/25    Recs:  Discharge planning    Discuss LT meeting as outpatient   Resume low dose diuretics 40/100  Continue Lactulose and rifaximin            Thank you for your consult. I will follow-up with patient. Please contact us if you have any additional questions.    Adriano Medina MD  Hepatology  [facility]

## 2018-01-27 NOTE — ASSESSMENT & PLAN NOTE
hh stable    Transfused 2 units of PRBCs on 1/22  Transfuse if less than 7  Colonoscopy revealed internal and external hemorrhoids

## 2018-01-27 NOTE — PLAN OF CARE
Problem: Physical Therapy Goal  Goal: Physical Therapy Goal  Goals to be met by: 18     Patient will increase functional independence with mobility by performin. Supine to sit with Supervision  - GOAL MET  2. Sit to stand transfer with Supervision - GOAL MET  3. Gait  x 150 feet with Supervision. - GOAL MET  4. Ascend/descend 5 stair with right Handrails Stand-by Assistance. - GOAL MET  5. Lower extremity exercise program x15 reps, with supervision, in order to increase LE strength and (I) with functional mobility      Outcome: Unable to achieve outcome(s) by discharge  Pt achieved 4/5 goals at time of discharge.

## 2018-01-27 NOTE — ASSESSMENT & PLAN NOTE
- Creatinine improving   - unclear currently, but possibly due to blood loss  - Cr stable at 1.5 improved from yesterday however baseline of 1.2  - cont albumin and octreotide;   - monitor after blood transfusions   - as per Hepatology will resume diuretics since patient tends to get volume overloaded

## 2018-01-27 NOTE — ASSESSMENT & PLAN NOTE
Hx of Etoh cirrhosis undergoing transplant eval who is admitted with volume overload on 1/20/2018.   Currently being treated for with SOB , LE edema and cellulitis.   Some concern on labs including anemia - negative w/u with EGD, colon and CT scan  Negative PETH 1/21.  Negative stress test - 1/25    Recs:  Discharge planning   Discuss LT meeting as outpatient   Resume low dose diuretics 40/100  Continue Lactulose and rifaximin

## 2018-01-29 ENCOUNTER — DOCUMENTATION ONLY (OUTPATIENT)
Dept: TRANSPLANT | Facility: CLINIC | Age: 63
End: 2018-01-29

## 2018-01-29 LAB — BACTERIA SPEC ANAEROBE CULT: NORMAL

## 2018-01-30 ENCOUNTER — PATIENT OUTREACH (OUTPATIENT)
Dept: ADMINISTRATIVE | Facility: CLINIC | Age: 63
End: 2018-01-30

## 2018-01-30 NOTE — PATIENT INSTRUCTIONS
Ascites    Ascites is fluid collecting in the abdomen (stomach area). Symptoms include swelling of the abdomen and a feeling of pressure. Shortness of breath may also occur. In severe cases, the feet, ankles and legs may also swell.   There are many causes of ascites. The most common are related to the liver. They include:  · Long-term alcohol abuse  · Hepatitis  · Diseases such as congestive heart failure, kidney failure, pancreatitis, or cancer  To treat the condition, a low-salt diet may be recommended. Medicines that help fluid leave the body (diuretics) may be prescribed. In some cases, a procedure is done to drain the abdomen of fluid. This is called paracentesis. Unless the underlying cause is treated, the fluid is likely to return.  If liver damage is due to alcohol, stopping all alcohol will help slow the progress of the disease. If liver damage is from hepatitis B or C, treatments may be given to fight the virus. If liver damage becomes life threatening, a liver transplant may be needed.  Home care  · Certain medicines can worsen liver damage. Talk to your healthcare provider or pharmacist about any medicines you currently take. Ask your healthcare provider or pharmacist before taking any new medicines. Also ask before taking herbs, vitamins, or minerals. Certain ones affect the liver.  · Do not taking acetaminophen or ibuprofen without taking to your healthcare provider first. Both can affect your liver.   · Stop all alcohol use. If you abuse alcohol, talk to your healthcare provider about getting help and support to stop.   · If you use IV drugs, seek help to stop. Never share needles or other equipment.    Follow-up care  Follow up with your healthcare provider as advised. If a culture was done, call as directed for the results. Depending on the results, your treatment may change.  The following sources can tell you more about ascites and help you find support.  · [employer]  [phone] www.liverfoundation.org  · Hepatitis Foundation International www.hepfi.org  · Alcoholics Anonymous www.aa.org  · National [place] on Alcoholism and Drug Dependence 088-540-8093 www.ncadd.org  When to seek medical advice  Call your healthcare provider right away if you have any of the following:  · Sudden weight gain with increased size of your abdomen or leg swelling  · Increasing jaundice (yellowing of skin or eyes)  · Excess bleeding from cuts or injuries  · Blood in vomit or stool (black or red color)  · Trouble breathing  · Increasing abdominal pain  · Fever of 100.4ºF (38ºC) or higher, or as directed by your healthcare provider  Date Last Reviewed: 6/16/2015  © 2399-9390 [employer]. [address] [place] [address], [place] [address]. All rights reserved. This information is not intended as a substitute for professional medical care. Always follow your healthcare professional's instructions.

## 2018-02-01 DIAGNOSIS — K74.60 DECOMPENSATED HEPATIC CIRRHOSIS: ICD-10-CM

## 2018-02-01 DIAGNOSIS — K72.90 DECOMPENSATED HEPATIC CIRRHOSIS: ICD-10-CM

## 2018-02-01 DIAGNOSIS — K70.31 ASCITES DUE TO ALCOHOLIC CIRRHOSIS: Primary | ICD-10-CM

## 2018-02-01 DIAGNOSIS — Z76.82 ORGAN TRANSPLANT CANDIDATE: ICD-10-CM

## 2018-02-02 ENCOUNTER — TELEPHONE (OUTPATIENT)
Dept: TRANSPLANT | Facility: CLINIC | Age: 63
End: 2018-02-02

## 2018-02-02 NOTE — TELEPHONE ENCOUNTER
----- Message from Ana Scott sent at 2/2/2018 12:59 PM CST -----  Contact: patient   Patient returning call        Please call

## 2018-02-02 NOTE — TELEPHONE ENCOUNTER
----- Message from Ady Ortega sent at 2/2/2018  4:02 PM CST -----  Sp to pt and told him about his appts kathryn'ed for FEBB 16. Vicky also asked about his labs that he was told that he need to have done today. Told him the nurse faxed over his labs order to [facility] @  [phone] and that if he could do them today, than ASAP. He also asked about the labs that are kathryn'ed for FEB 16 since he will be doing the labs the week before. Told him the labs booked onn DEB 16 are labs we kathryn before every appt with a MD/NP and the ones he will be doing the week before are to monitor his MELD score. Based on those labs he will do at home will determaine if he will still need to do the labs on FEB 16. Mailed out copy of pt appts [facility]'ed for FEB 16.

## 2018-02-05 ENCOUNTER — DOCUMENTATION ONLY (OUTPATIENT)
Dept: TRANSPLANT | Facility: CLINIC | Age: 63
End: 2018-02-05

## 2018-02-05 LAB
EXT ALBUMIN: 2.4
EXT ALKALINE PHOSPHATASE: 111
EXT ALT: 46
EXT AST: 77
EXT BILIRUBIN DIRECT: 0.7 MG/DL
EXT BILIRUBIN TOTAL: 1.9
EXT BUN: 11
EXT CALCIUM: 7.8
EXT CHLORIDE: 106
EXT CO2: 23
EXT CREATININE: 1.5 MG/DL
EXT GFR MDRD NON AF AMER: 48
EXT GLUCOSE: 165
EXT HEMATOCRIT: 28.4
EXT HEMOGLOBIN: 10
EXT INR: 1.73
EXT PLATELETS: 57
EXT POTASSIUM: 3.6
EXT PROTEIN TOTAL: 5.6
EXT PT: 20.9
EXT SODIUM: 137 MMOL/L
EXT WBC: 4.6

## 2018-02-05 NOTE — PLAN OF CARE
01/27/18 1526   Final Note   Assessment Type Final Discharge Note   Discharge Disposition Home   Discharge planning education complete? Yes   Hospital Follow Up  Appt(s) scheduled? Yes     Future Appointments  Date Time Provider Department Center   2/16/2018 10:00 AM Thomas Marin Jr., MD [facility] HEARTTX [facility]   2/16/2018 11:00 AM LIVER SURGERY, TRANSPLANT [facility] LIVERTX [facility]   2/16/2018 12:05 PM LAB, APPOINTMENT [facility] LAB VNP [facility]   2/16/2018 1:40 PM Reyna Hernadez NP [facility] LIVERTX [facility]

## 2018-02-06 ENCOUNTER — TELEPHONE (OUTPATIENT)
Dept: PHARMACY | Facility: CLINIC | Age: 63
End: 2018-02-06

## 2018-02-06 NOTE — TELEPHONE ENCOUNTER
Patient called for f/u and refill readiness on Xifaxan following admission and discharge from hospital.  N/a lvm for call back.     Elan Zambrano, [email or web address].  Clinical Pharmacist  [facility]  Phone: [phone]

## 2018-02-07 ENCOUNTER — TELEPHONE (OUTPATIENT)
Dept: TRANSPLANT | Facility: CLINIC | Age: 63
End: 2018-02-07

## 2018-02-07 NOTE — TELEPHONE ENCOUNTER
Patient informed of lab results and MD recommendation to increase lasix to 40 mg BID and continue aldactone 100 mg daily, and repeat labs in one week.  He voiced understanding.  Able to accurately repeat back dosage change.

## 2018-02-07 NOTE — TELEPHONE ENCOUNTER
Called patient to discuss lab results and MD recommendations regarding diuretics.  No answer at this time.  Message left on VM.  Return phone call requested.  Contact info provided.  Awaiting callback.

## 2018-02-07 NOTE — TELEPHONE ENCOUNTER
Patient returned phone call back regarding specialty medication refill for Xifaxan $0/004. He informed no missed dose & have about 7 days of medication left. Patient confirmed direction. Patient informed no new conditions, allergies or medications. He voiced understanding. Patient confirmed to ship medication out on Thurs 02/08 to arrive on Fri 02/09 via [facility] no signature required & address confirmed.

## 2018-02-16 ENCOUNTER — OFFICE VISIT (OUTPATIENT)
Dept: TRANSPLANT | Facility: CLINIC | Age: 63
End: 2018-02-16
Payer: COMMERCIAL

## 2018-02-16 ENCOUNTER — EVALUATION (OUTPATIENT)
Dept: TRANSPLANT | Facility: CLINIC | Age: 63
End: 2018-02-16
Payer: COMMERCIAL

## 2018-02-16 ENCOUNTER — INITIAL CONSULT (OUTPATIENT)
Dept: TRANSPLANT | Facility: CLINIC | Age: 63
End: 2018-02-16
Attending: INTERNAL MEDICINE
Payer: COMMERCIAL

## 2018-02-16 ENCOUNTER — LAB VISIT (OUTPATIENT)
Dept: LAB | Facility: HOSPITAL | Age: 63
End: 2018-02-16
Attending: INTERNAL MEDICINE
Payer: COMMERCIAL

## 2018-02-16 VITALS
HEIGHT: 74 IN | OXYGEN SATURATION: 100 % | DIASTOLIC BLOOD PRESSURE: 56 MMHG | HEART RATE: 89 BPM | TEMPERATURE: 98 F | WEIGHT: 294 LBS | SYSTOLIC BLOOD PRESSURE: 119 MMHG | BODY MASS INDEX: 37.73 KG/M2 | RESPIRATION RATE: 18 BRPM

## 2018-02-16 VITALS
RESPIRATION RATE: 18 BRPM | BODY MASS INDEX: 37.73 KG/M2 | HEART RATE: 89 BPM | HEIGHT: 74 IN | DIASTOLIC BLOOD PRESSURE: 56 MMHG | WEIGHT: 294 LBS | SYSTOLIC BLOOD PRESSURE: 119 MMHG | TEMPERATURE: 98 F | OXYGEN SATURATION: 100 %

## 2018-02-16 VITALS — HEIGHT: 74 IN | HEART RATE: 100 BPM | SYSTOLIC BLOOD PRESSURE: 140 MMHG | DIASTOLIC BLOOD PRESSURE: 52 MMHG

## 2018-02-16 DIAGNOSIS — N17.9 AKI (ACUTE KIDNEY INJURY): ICD-10-CM

## 2018-02-16 DIAGNOSIS — K74.69 OTHER CIRRHOSIS OF LIVER: ICD-10-CM

## 2018-02-16 DIAGNOSIS — K70.31 ASCITES DUE TO ALCOHOLIC CIRRHOSIS: Primary | ICD-10-CM

## 2018-02-16 DIAGNOSIS — K74.60 DECOMPENSATED HEPATIC CIRRHOSIS: Primary | ICD-10-CM

## 2018-02-16 DIAGNOSIS — Z01.810 PREOPERATIVE CARDIOVASCULAR EXAMINATION: Primary | ICD-10-CM

## 2018-02-16 DIAGNOSIS — K72.90 DECOMPENSATED HEPATIC CIRRHOSIS: ICD-10-CM

## 2018-02-16 DIAGNOSIS — E78.5 HYPERLIPIDEMIA LDL GOAL <70: ICD-10-CM

## 2018-02-16 DIAGNOSIS — K76.82 HEPATIC ENCEPHALOPATHY: ICD-10-CM

## 2018-02-16 DIAGNOSIS — R60.0 BILATERAL EDEMA OF LOWER EXTREMITY: ICD-10-CM

## 2018-02-16 DIAGNOSIS — K72.90 DECOMPENSATED HEPATIC CIRRHOSIS: Primary | ICD-10-CM

## 2018-02-16 DIAGNOSIS — R60.0 BILATERAL LEG EDEMA: ICD-10-CM

## 2018-02-16 DIAGNOSIS — D63.8 ANEMIA OF CHRONIC DISEASE: ICD-10-CM

## 2018-02-16 DIAGNOSIS — K74.60 DECOMPENSATED HEPATIC CIRRHOSIS: ICD-10-CM

## 2018-02-16 DIAGNOSIS — I25.10 CORONARY ARTERY DISEASE INVOLVING NATIVE CORONARY ARTERY OF NATIVE HEART WITHOUT ANGINA PECTORIS: ICD-10-CM

## 2018-02-16 DIAGNOSIS — R18.8 CIRRHOSIS OF LIVER WITH ASCITES, UNSPECIFIED HEPATIC CIRRHOSIS TYPE: ICD-10-CM

## 2018-02-16 DIAGNOSIS — K74.60 CIRRHOSIS OF LIVER WITH ASCITES, UNSPECIFIED HEPATIC CIRRHOSIS TYPE: ICD-10-CM

## 2018-02-16 DIAGNOSIS — K70.30 ALCOHOLIC CIRRHOSIS OF LIVER WITHOUT ASCITES: ICD-10-CM

## 2018-02-16 DIAGNOSIS — E88.09 HYPOALBUMINEMIA: ICD-10-CM

## 2018-02-16 LAB
ALBUMIN SERPL BCP-MCNC: 2 G/DL
ALP SERPL-CCNC: 106 U/L
ALT SERPL W/O P-5'-P-CCNC: 145 U/L
ANION GAP SERPL CALC-SCNC: 7 MMOL/L
AST SERPL-CCNC: 297 U/L
BASOPHILS # BLD AUTO: 0.02 K/UL
BASOPHILS NFR BLD: 0.5 %
BILIRUB SERPL-MCNC: 2.4 MG/DL
BUN SERPL-MCNC: 19 MG/DL
CALCIUM SERPL-MCNC: 9.2 MG/DL
CHLORIDE SERPL-SCNC: 106 MMOL/L
CO2 SERPL-SCNC: 22 MMOL/L
CREAT SERPL-MCNC: 1.3 MG/DL
DIFFERENTIAL METHOD: ABNORMAL
EOSINOPHIL # BLD AUTO: 0.1 K/UL
EOSINOPHIL NFR BLD: 2.3 %
ERYTHROCYTE [DISTWIDTH] IN BLOOD BY AUTOMATED COUNT: 14.6 %
EST. GFR  (AFRICAN AMERICAN): >60 ML/MIN/1.73 M^2
EST. GFR  (NON AFRICAN AMERICAN): 58.9 ML/MIN/1.73 M^2
GLUCOSE SERPL-MCNC: 199 MG/DL
HCT VFR BLD AUTO: 25.5 %
HGB BLD-MCNC: 8.6 G/DL
IMM GRANULOCYTES # BLD AUTO: 0.05 K/UL
IMM GRANULOCYTES NFR BLD AUTO: 1.3 %
INR PPP: 1.4
LYMPHOCYTES # BLD AUTO: 0.5 K/UL
LYMPHOCYTES NFR BLD: 11.9 %
MCH RBC QN AUTO: 33.1 PG
MCHC RBC AUTO-ENTMCNC: 33.7 G/DL
MCV RBC AUTO: 98 FL
MONOCYTES # BLD AUTO: 0.8 K/UL
MONOCYTES NFR BLD: 21.8 %
NEUTROPHILS # BLD AUTO: 2.4 K/UL
NEUTROPHILS NFR BLD: 62.2 %
NRBC BLD-RTO: 0 /100 WBC
PLATELET # BLD AUTO: 59 K/UL
PMV BLD AUTO: 11.6 FL
POTASSIUM SERPL-SCNC: 3.8 MMOL/L
PROT SERPL-MCNC: 5.5 G/DL
PROTHROMBIN TIME: 13.8 SEC
RBC # BLD AUTO: 2.6 M/UL
SODIUM SERPL-SCNC: 135 MMOL/L
WBC # BLD AUTO: 3.86 K/UL

## 2018-02-16 PROCEDURE — 80053 COMPREHEN METABOLIC PANEL: CPT | Mod: TXP

## 2018-02-16 PROCEDURE — 99214 OFFICE O/P EST MOD 30 MIN: CPT | Mod: S$GLB,TXP,, | Performed by: NURSE PRACTITIONER

## 2018-02-16 PROCEDURE — 99999 PR PBB SHADOW E&M-EST. PATIENT-LVL IV: CPT | Mod: PBBFAC,TXP,, | Performed by: NURSE PRACTITIONER

## 2018-02-16 PROCEDURE — 3008F BODY MASS INDEX DOCD: CPT | Mod: S$GLB,TXP,, | Performed by: TRANSPLANT SURGERY

## 2018-02-16 PROCEDURE — 99204 OFFICE O/P NEW MOD 45 MIN: CPT | Mod: S$GLB,TXP,, | Performed by: INTERNAL MEDICINE

## 2018-02-16 PROCEDURE — 99213 OFFICE O/P EST LOW 20 MIN: CPT | Mod: S$GLB,TXP,, | Performed by: TRANSPLANT SURGERY

## 2018-02-16 PROCEDURE — 99999 PR PBB SHADOW E&M-EST. PATIENT-LVL III: CPT | Mod: PBBFAC,TXP,,

## 2018-02-16 PROCEDURE — 36415 COLL VENOUS BLD VENIPUNCTURE: CPT | Mod: TXP

## 2018-02-16 PROCEDURE — 85610 PROTHROMBIN TIME: CPT | Mod: TXP

## 2018-02-16 PROCEDURE — 85025 COMPLETE CBC W/AUTO DIFF WBC: CPT | Mod: TXP

## 2018-02-16 PROCEDURE — 3008F BODY MASS INDEX DOCD: CPT | Mod: S$GLB,TXP,, | Performed by: NURSE PRACTITIONER

## 2018-02-16 PROCEDURE — 3008F BODY MASS INDEX DOCD: CPT | Mod: S$GLB,TXP,, | Performed by: INTERNAL MEDICINE

## 2018-02-16 PROCEDURE — 99999 PR PBB SHADOW E&M-EST. PATIENT-LVL III: CPT | Mod: PBBFAC,TXP,, | Performed by: INTERNAL MEDICINE

## 2018-02-16 RX ORDER — FUROSEMIDE 40 MG/1
TABLET ORAL
Start: 2018-02-16 | End: 2018-03-23 | Stop reason: SDUPTHER

## 2018-02-16 RX ORDER — SPIRONOLACTONE 100 MG/1
TABLET, FILM COATED ORAL
Start: 2018-02-16 | End: 2018-03-23 | Stop reason: SDUPTHER

## 2018-02-16 NOTE — PATIENT INSTRUCTIONS
1. Increase fluid pills:   -- Lasix 60 mg in AM and 40 mg in PM  -- Spironolactone 150 mg in AM and 100 mg in PM    2. Will repeat labs in 1 week locally  3. Follow-up in [facility] in 4 weeks to reassess

## 2018-02-16 NOTE — PROGRESS NOTES
Subjective:     Patient ID:  Alon Adamson is a 62 y.o. male who presents for evaluation of Liver Transplant Pre-evaluation    HPI:  61 yo WM with known CAD, cirrhosis, DM and lipid disorder was referred for cardiac clearance prior to liver transplant.  On 12/7/2016 Dr. Marin performed right and left heart cath that demonstrated 40% mid LAD, distal LAD 70%, proximal OM 1 40%, proximal RCA 50%, right PDA 70% and he recommended medical therapy.  At that time RHC performed and wedge press 15, PA 37/13 (calc mean 21), RA 9, Estephania CO 8.5 L/min (very high due to liver disease presumably) with calculated PVR of 0.7 Wood units.      On 12/1/17 abnormal PET stress lead to repeat angiogram 3/16/17.  At that time angiogram demonstrated distal LAD has a 75% stenosis, normal LCFx and RCA, 75% ostial PDA.  Dr. Marin performed coronary intervention with distal LAD reduced to 0% using Stent Integrity 2.5 X 14, the ostial PDA lesion reduced to 0% using Stent Integrity 2.5 X 14.      He has done fine and denies any No tightness, pressure or heaviness in chest, neck, arms, throat, jaw or back with or without exertion.  Some SOB but major limitation is generalized weakness.      He underwent ECHO 1/23/18 read by Dr. Kirkpatrick:    1 - Normal left ventricular systolic function (EF 60-65%).     2 - Biatrial enlargement.     3 - Eccentric hypertrophy.     4 - Normal left ventricular diastolic function.     5 - Normal right ventricular systolic function .     6 - The estimated PA systolic pressure is greater than 30 mmHg. She could not estimate CVP as IVC not adequately visualized.  PA systolic pressure would be CVP + 30 mm Hg.    7 - Mild tricuspid regurgitation.     On 1/26/18 he underwent  stress echo read by Dr. Xavier which demonstrated no ischemia at peak  (89%) predicted.  The TR velocity is not routinely measured nor is IVC for stress ECHO and neither were visualized so no PA pressure estimate possible (other study done  3 days earlier).    Past Medical History:   Diagnosis Date    Anticoagulant long-term use     Arthritis     Back pain     Cellulitis     Cirrhosis     Coronary artery disease     DM (diabetes mellitus)     Hearing loss     right ear    Hepatic encephalopathy     Hypertension     [id number]/25/2015    Leg edema     Nephrolithiasis     JACK (obstructive sleep apnea)     Seizures     Splenomegaly      Past Surgical History:   Procedure Laterality Date    APPENDECTOMY      Open    BACK SURGERY      CHOLECYSTECTOMY      laprascopic    COLONOSCOPY N/A 1/25/2018    Procedure: COLONOSCOPY;  Surgeon: Lashawn Myles MD;  Location: [address]);  Service: Endoscopy;  Laterality: N/A;    LUMBAR DISCECTOMY      SPLENIC ARTERY EMBOLIZATION  04/08/2016    Dr Taveras    TONSILLECTOMY       Family History   Problem Relation Age of Onset    Heart attack Neg Hx     Heart disease Neg Hx     Hypertension Neg Hx      Social History    Marital status:      Social History Main Topics    Smoking status: Never Smoker    Smokeless tobacco: Current User     Types: Chew    Alcohol use No    Drug use: No     Current Outpatient Prescriptions   Medication Sig    ascorbic acid (VITAMIN C) 1000 MG tablet Take 1,000 mg by mouth once daily.    aspirin (ECOTRIN) 81 MG EC tablet Take 1 tablet (81 mg total) by mouth once daily.    atorvastatin (LIPITOR) 40 MG tablet TAKE 1 TABLET(40 MG) BY MOUTH EVERY DAY    docusate sodium (COLACE) 100 MG capsule Take 100 mg by mouth once daily.     ferrous gluconate 270 mg (27 mg iron) Tab Take 1 tablet by mouth once daily.    fish oil-omega-3 fatty acids 300-1,000 mg capsule Take 2 capsules (2 g total) by mouth once daily. (Patient taking differently: Take 2 g by mouth 2 (two) times daily. )    GENERLAC 10 gram/15 mL solution Take 60 mLs (40 g total) by mouth 3 (three) times daily. 60mg three times per day (Patient taking differently: Take 60 mLs by mouth 3 (three)  "times daily. )    levETIRAcetam (KEPPRA) 500 MG Tab Take 1 tablet (500 mg total) by mouth 2 (two) times daily.    magnesium oxide (MAG-OX) 400 mg tablet Take 1 tablet (400 mg total) by mouth once daily.    ondansetron (ZOFRAN) 4 MG tablet Take 1 tablet (4 mg total) by mouth every 8 (eight) hours as needed for Nausea.    pantoprazole (PROTONIX) 40 MG tablet Take 1 tablet (40 mg total) by mouth 2 (two) times daily before meals.    rifAXIMin (XIFAXAN) 550 mg Tab Take 1 tablet (550 mg total) by mouth 2 (two) times daily.    tramadol (ULTRAM) 50 mg tablet Take 50 mg by mouth every 6 (six) hours as needed for Pain.    furosemide (LASIX) 40 MG tablet Take 60 mg in AM, 40 mg in PM    spironolactone (ALDACTONE) 100 MG tablet Take 150 mg in AM, 100 mg in PM     Review of patient's allergies indicates:   Allergen Reactions    Nsaids (non-steroidal anti-inflammatory drug)      D/t to liver disease.    Tylenol [acetaminophen]      D/t liver disease.    Penicillins Other (See Comments)     Since childhood was told not to take it.     Review of Systems   Constitution: Positive for weakness, malaise/fatigue and weight loss. Negative for chills and fever.   Cardiovascular: Positive for dyspnea on exertion and leg swelling. Negative for chest pain, orthopnea, palpitations, paroxysmal nocturnal dyspnea and syncope.   Respiratory: Negative for cough, shortness of breath, sputum production and wheezing.    Hematologic/Lymphatic: Bruises/bleeds easily (bruising).   Gastrointestinal:        See hepatology notes--he has cirrhosis, ascites   Genitourinary: Positive for frequency. Negative for dysuria and hematuria.   Neurological: Negative for brief paralysis and focal weakness.     Objective:   Physical Exam   Constitutional: No distress.   BP (!) 140/52 (BP Location: Right arm, Patient Position: Sitting, BP Method: X-Large (Automatic))   Pulse 100   Ht 6' 2" (1.88 m)   Sickly appearing WM in NAD, friendly and cooperative but " weak   HENT:   Head: Normocephalic and atraumatic.   Nose: Nose normal.   Mouth/Throat: Oropharynx is clear and moist. No oropharyngeal exudate.   He has bad teeth   Eyes: Conjunctivae are normal. Right eye exhibits no discharge. Left eye exhibits no discharge. No scleral icterus.   Neck: No JVD (he has beard that is in the way of my determing CVP.  The external jugular is seen  sitting in chair and is at clavicle suggesting mean CVP 10 cm water but I cannot see interrnal jugular which would be more accurate measure of CVP) present. No tracheal deviation present. No thyromegaly present.   Cardiovascular: Normal rate and regular rhythm.  Exam reveals no gallop.    Murmur (Grade 1/6 systolic murmur at base ) heard.  Pulmonary/Chest: Effort normal and breath sounds normal.   Abdominal: Soft. Bowel sounds are normal. He exhibits distension (ascites). He exhibits no mass (but hard to tell with ascites). There is no tenderness. There is no rebound and no guarding.   Musculoskeletal: He exhibits edema (4+ edema extends into both thighs). He exhibits no tenderness.   Neurological: He is alert.   Skin: Skin is warm and dry. He is not diaphoretic.   Psychiatric: He has a normal mood and affect. His behavior is normal.     Lab Results   Component Value Date    BNP 88 01/20/2018     (L) 02/16/2018    K 3.8 02/16/2018     02/16/2018    CO2 22 (L) 02/16/2018    BUN 19 02/16/2018    CREATININE 1.3 02/16/2018     (H) 02/16/2018     (H) 02/16/2018     (H) 02/16/2018    ALBUMIN 2.0 (L) 02/16/2018    PROT 5.5 (L) 02/16/2018    BILITOT 2.4 (H) 02/16/2018    WBC 3.86 (L) 02/16/2018    HGB 8.6 (L) 02/16/2018    HCT 25.5 (L) 02/16/2018    PLT 59 (L) 02/16/2018    INR 1.4 (H) 02/16/2018     Assessment:     1. Preoperative cardiovascular examination    2. Coronary artery disease involving native coronary artery of native heart without angina pectoris    3. Cirrhosis of liver with ascites, unspecified hepatic  cirrhosis type    4. Hyperlipidemia LDL goal <70    5. Bilateral edema of lower extremity      Plan:   He has marked edema and ascites today and due to see hepatology next so will defer any diuretic adjustments to them  I have asked Dr. Xavier to look at echo and report PA pressure as I cannot get images to display in clinic--addendum see HPI  Assuming PASP is indeed normal then from cardiology perspective he is cleared for A&S--I cannot state with certainty that PA pressures were normal back in January 2018 since I cannot assume today's CVP represents what was present at that time. For definite PA pressure determination would need to perform RHC.  Other than this unknown there is no cardiac contraindication to surgery.    Thank you for allowing me to see this nice man in consultation and do not hesitate to contact me if any questions arise.    Arnulfo Marin MD  [phone], extension [phone]  Cell [phone]

## 2018-02-16 NOTE — LETTER
February 20, 2018        Bruno Scott  [address]  [place] [address]  Phone: [phone]  Fax: [phone]     Mckinley Vides  [address]  [place] [address]  Phone: [phone]  Fax: [phone]             [facility] [address]  [facility] [address]  Phone: [phone]   Patient: Alon Adamson   MR Number: [id number]   YOB: 1955   Date of Visit: 2/16/2018       Dear Dr. Bruno Scott, Mckinley Vides    Thank you for referring Alon Adamson to me for evaluation. Attached you will find relevant portions of my assessment and plan of care.    If you have questions, please do not hesitate to call me. I look forward to following Alon Adamson along with you.    Sincerely,    Thomas Marin Jr, MD    Enclosure    If you would like to receive this communication electronically, please contact [email or web address] or [phone] to request [employer] access.    [employer] is a tool which provides read-only access to select patient information with whom you have a relationship. Its easy to use and provides real time access to review your patients record including encounter summaries, notes, results, and demographic information.    If you feel you have received this communication in error or would no longer like to receive these types of communications, please e-mail [email or web address]

## 2018-02-16 NOTE — PROGRESS NOTES
[facility] Established Patient Visit    Reason for Visit: Hospital follow-up     PCP: Mckinley Vides    HPI:  This is a 62 y.o. male here for hospital follow-up. He is here today with his wife. Mr. Adamson has cirrhosis 2/2 alcohol. He is currently being evaluated for liver transplant. Was previously deferred in 2015 due to low MELD and again in 2016 due to need for cardiology evaluation. He has since been seen by cardiology and underwent PCI and stenting in 2/2017. Repeat stress done 1/26/18 and had f/u with Cards today. He was also seen by transplant clinic today and determined to be suitable candidate; final decision for listing pending review by selection committee.          He was recently hospitalized (1/20 - 1/27) with worsening ascites and lower extremity edema. Discharged on low dose diuretics (due to VICKY while inpatient) though swelling quickly returned and diuretics were increased to lasix 40 mg BID and spironolactone 100 mg BID last week. He did have inpatient paracentesis with 3.4 L removed; no SBP. Has not required repeat paracentesis since discharge. Also noted to have cellulitis and stasis ulcers inpatient; improved with antibiotics. H/H significantly lower while in the hospital - EGD, CT abd pelvis, and colonoscopy with no etiology for acute bleeding.     Blood counts have remained stable post discharge - 8.6/25.5 on labs today. No hematemesis, melena, or signs of bleeding. He remains on lactulose and xifaxan; no s/s HE, mental status at baseline. Lower extremity edema still not well controlled. He is watching his salt intake closely. Creatinine stable today, 1.3. Still experiencing fatigue, weakness, and SOB on exertion. Transaminases also noted to be more elevated on labs today - , . Denies any recent viral illness, no fevers.                     Cirrhosis Health Maintenance:  -HCC screening: up to date, abd US from 1/2018 with no hepatic lesions; AFP 5.3   -Variceal  screening: EGD 1/25/2018 with small varices  -Hepatitis A & B vaccination: vaccines complete      ROS:   GENERAL: Denies fever, chills, weight loss/gain, (+) fatigue, weakness  HEENT: Denies headaches, dizziness, vision/hearing changes  CARDIOVASCULAR: Denies chest pain, palpitations. (+) bilateral leg swelling  RESPIRATORY: Denies dyspnea, cough  GI: Denies abdominal pain, rectal bleeding, nausea, vomiting. No change in bowel pattern or color  : Denies dysuria, hematuria   SKIN: Denies rash, itching   NEURO: Denies confusion, memory loss, or mood changes  PSYCH: Denies depression or anxiety  HEME/LYMPH: (+) easy bruising or bleeding      PMHX:  has a past medical history of Anticoagulant long-term use; Arthritis; Back pain; Cellulitis; Cirrhosis; Coronary artery disease; DM (diabetes mellitus); Hearing loss; Hepatic encephalopathy; Hypertension; Leg edema; Nephrolithiasis; JACK (obstructive sleep apnea); Seizures; and Splenomegaly.    PSHX:  has a past surgical history that includes Cholecystectomy; Appendectomy; Tonsillectomy; Lumbar discectomy; Splenic artery embolization (04/08/2016); Back surgery; and Colonoscopy (N/A, 1/25/2018).    The patient's social and family histories were reviewed by me and updated in the appropriate section of the electronic medical record.    Review of patient's allergies indicates:   Allergen Reactions    Nsaids (non-steroidal anti-inflammatory drug)      D/t to liver disease.    Tylenol [acetaminophen]      D/t liver disease.    Penicillins Other (See Comments)     Since childhood was told not to take it.     Current Outpatient Prescriptions on File Prior to Visit   Medication Sig Dispense Refill    ascorbic acid (VITAMIN C) 1000 MG tablet Take 1,000 mg by mouth once daily.      aspirin (ECOTRIN) 81 MG EC tablet Take 1 tablet (81 mg total) by mouth once daily. 90 tablet 3    atorvastatin (LIPITOR) 40 MG tablet TAKE 1 TABLET(40 MG) BY MOUTH EVERY DAY 30 tablet 0    docusate  sodium (COLACE) 100 MG capsule Take 100 mg by mouth once daily.       ferrous gluconate 270 mg (27 mg iron) Tab Take 1 tablet by mouth once daily.      fish oil-omega-3 fatty acids 300-1,000 mg capsule Take 2 capsules (2 g total) by mouth once daily. (Patient taking differently: Take 2 g by mouth 2 (two) times daily. )      furosemide (LASIX) 40 MG tablet Take 1 tablet (40 mg total) by mouth 2 (two) times daily. 180 tablet 1    GENERLAC 10 gram/15 mL solution Take 60 mLs (40 g total) by mouth 3 (three) times daily. 60mg three times per day (Patient taking differently: Take 60 mLs by mouth 3 (three) times daily. ) 5400 mL 6    levETIRAcetam (KEPPRA) 500 MG Tab Take 1 tablet (500 mg total) by mouth 2 (two) times daily. 180 tablet 1    magnesium oxide (MAG-OX) 400 mg tablet Take 1 tablet (400 mg total) by mouth once daily.  0    ondansetron (ZOFRAN) 4 MG tablet Take 1 tablet (4 mg total) by mouth every 8 (eight) hours as needed for Nausea. 15 tablet 3    pantoprazole (PROTONIX) 40 MG tablet Take 1 tablet (40 mg total) by mouth 2 (two) times daily before meals. 60 tablet 11    rifAXIMin (XIFAXAN) 550 mg Tab Take 1 tablet (550 mg total) by mouth 2 (two) times daily. 60 tablet 5    spironolactone (ALDACTONE) 100 MG tablet Take 1 tablet (100 mg total) by mouth 2 (two) times daily. (Patient taking differently: Take 100 mg by mouth once daily. ) 270 tablet 1    tramadol (ULTRAM) 50 mg tablet Take 50 mg by mouth every 6 (six) hours as needed for Pain.       No current facility-administered medications on file prior to visit.        Objective Findings:    PHYSICAL EXAM:   Friendly White male, in no acute distress; alert and oriented to person, place and time  VITALS:   Vitals:    02/16/18 1305   BP: (!) 119/56   Pulse: 89   Resp: 18   Temp: 97.9 °F (36.6 °C)   HENT: Normocephalic, without obvious abnormality. Oral mucosa pink and moist.   EYES: Sclerae anicteric.   NECK: Supple. No masses or cervical  adenopathy.  CARDIOVASCULAR: Regular rate and rhythm. No murmurs.  RESPIRATORY: Normal respiratory effort. BBS CTA. No wheezes or crackles.  GI: Non-tender. (+) abdominal distention. No palpable hepatosplenomegaly. No masses palpable.   EXTREMITIES:  No clubbing, cyanosis. +3 edema to BLE, legs hardened.   SKIN: Warm and dry. No jaundice. No rashes noted to exposed skin. No telangectasias noted. No palmar erythema.  NEURO:  Normal gate. No asterixis.  PSYCH:  Memory intact. Thought and speech pattern appropriate. Behavior normal. No depression or anxiety noted.    Labs:  Lab Results   Component Value Date    WBC 3.86 (L) 02/16/2018    HGB 8.6 (L) 02/16/2018    HCT 25.5 (L) 02/16/2018    PLT 59 (L) 02/16/2018     (L) 02/16/2018    K 3.8 02/16/2018    CREATININE 1.3 02/16/2018     (H) 02/16/2018     (H) 02/16/2018    ALKPHOS 106 02/16/2018    BILITOT 2.4 (H) 02/16/2018    ALBUMIN 2.0 (L) 02/16/2018    INR 1.4 (H) 02/16/2018    AFP 5.3 04/21/2017       Diagnostic Studies  EGD - 1/25/2018  Findings:       Small (< 5 mm) varices were found in the lower third of the        esophagus.       Striped mildly erythematous mucosa was found in the stomach.       The cardia and gastric fundus were normal on retroflexion.       The examined duodenum was normal.  Impression:   - Small (< 5 mm) esophageal varices.                        - Erythematous mucosa in the stomach.                        - Normal examined duodenum.                        - No specimens collected.    COLONOSCOPY - 1/25/2018  Findings:       Large prolapsing hemorrhoids with possible rectal prolapse found on        perianal exam.       A diffuse area of moderately friable mucosa with contact bleeding        was found in the entire colon.       A 7 mm polyp was found in the cecum. The polyp was sessile. Not        removed due to low plt and recent bleeding.       There was a small lipoma, 8 mm in diameter, in the transverse colon         (positive pillow sign).       There was a small lipoma, 5 mm in diameter, in the ascending colon        (positive pillow sign).       A few small and large-mouthed diverticula were found in the sigmoid        colon, transverse colon and ascending colon.       LArge non-bleeding internal hemorrhoids and large rectal varices        were found during retroflexion.       The entire examined colon appeared normal on direct and retroflexion        views.       Unable to examine terminal ileum.  Impression:    - Hemorrhoids and possible rectal prolapse found on                         perianal exam.                        - Friability with contact bleeding in the entire                         examined colon.                        - One 7 mm polyp in the cecum. Not removed.                        - Small lipoma in the transverse colon.                        - Small lipoma in the ascending colon.                        - Diverticulosis in the sigmoid colon, in the                         transverse colon and in the ascending colon.                        - Non-bleeding internal hemorrhoids and rectal                         varices.      ABD. U/S - 1/21/2018  FINDINGS:     This is a very technically difficult exam due to patient's body habitus and significant amount of ascites.    The liver is small measuring 13.8 cm with coarse echotexture and irregular contour suggesting cirrhosis.  No definite focal hepatic lesions.    There is large volume ascites present.    The gallbladder has been removed.  There is no intra-or extrahepatic biliary ductal dilatation.  The common bile duct measures 0.3 cm.    The spleen is enlarged measuring 15.5 x 7.2 cm.  The entire right kidney could not be seen.  The left kidney measures 11.5 cm.    The main, right and left portal veins are not definitely seen.    The hepatic veins are patent.  The main hepatic artery is patent with normal waveform.    The splenic vein is patent.  Splenic  collateral vessels are noted.   Impression       Technically difficult exam as described above.    The liver is small measuring 13.8 cm with coarse echotexture and irregular contour suggesting cirrhosis.      Main, right, and left portal veins are not definitely seen.  This may be due to occlusion versus technically difficult exam.    Large volume ascites.    Status post cholecystectomy.       ASSESSMENT:  62 y.o. White male with:  1. Alcoholic cirrhosis, decompensated with fluid retention  -- MELD-Na score: 18 at 2/16/2018 11:54 AM  MELD score: 16 at 2/16/2018 11:54 AM  Calculated from:  Serum Creatinine: 1.3 mg/dL at 2/16/2018 11:54 AM  Serum Sodium: 135 mmol/L at 2/16/2018 11:54 AM  Total Bilirubin: 2.4 mg/dL at 2/16/2018 11:54 AM  INR(ratio): 1.4 at 2/16/2018 11:54 AM  Age: 62 years  -- HCC screening: up to date, abd US from 1/2018 with no hepatic lesions; AFP 5.3   -- Variceal screening: up to date  -- Hepatitis A & B vaccination: vaccines complete    2. Fluid retention  -- Still having lower extremity swelling on lasix 40 mg BID and spironolactone 100 mg BID  -- Inpatient paracentesis with 3.4 L removed    3. Esophageal varices  -- EGD 1/25/2018 with small varices     4. Hepatic encephalopathy  -- Well controlled on lactulose and xifaxan    5. Anemia  -- H/H stable at 8.6 / 25.5; denies hematemesis or melena    6. Elevated transaminases  -- Etiology unclear, may be transient increase    7. Hypoalbuminemia  -- Patient drinking protein shakes     8. Renal insufficiency  -- Creatinine stable at 1.3 today       EDUCATION / DISCUSSION:   The disease process and manifestations of cirrhosis were discussed. Signs and symptoms of hepatic decompensation were reviewed, including jaundice, ascites, and slowed mentation due to hepatic encephalopathy. The patient should seek medical attention if any of these things occur.  We discussed the potential for bleeding from esophageal varices with symptoms of hematemesis and  melena. The patient should report to the Emergency Department for these symptoms.    Cirrhosis Counseling  -- Strict abstinence from alcohol  -- Avoid non-steroidal anti-inflammatory drugs (NSAIDs) such as ibuprofen (Motrin, Advil), naproxen (Naprosyn, Aleve)  -- Tylenol/acetaminophen as needed for pain, no more than 2000 mg per day  -- No raw seafood due to the risk of infection  -- Low sodium diet, less than 2g per day   -- Daily protein intake of 1.2-1.5 gram protein / kg body weight per day to prevent muscle mass loss  -- Upper endoscopy every 1-2 years to screen for varices (enlarged blood vessels) in the stomach and esophagus which can burst and cause fatal bleeding  -- Increased risk of hepatocellular carcinoma (liver cancer) due to cirrhosis. Continued screening every 6 months with ultrasound and AFP  -- Compliance with lactulose and rifaximin for hepatic encephalopathy (confusion due to high ammonia level)       PLAN:  1. Increase diuretics: lasix 60 mg in AM, 40 mg in PM; spironolactone 150 mg in AM, 100 mg in PM  2. Repeat labs in 1 week (to be done locally) to monitor kidney function and liver enzymes  3. May need repeat imaging if transaminases remain elevated  4. Continue HCC screening every 6 months, next due 7/2018  5. EGD for varices surveillance due 1/2019  6. Return to [facility] in 4 weeks to reassess fluid        Thank you for allowing me to participate in the care of Alon Muller Snow Hernadez, FNP-C  Hepatology and Liver Transplant

## 2018-02-16 NOTE — PROGRESS NOTES
Transplant Surgery Liver Transplant Recipient Evaluation    Referring Physician: Bruno Scott  Corresponding Physician: Mckinley Rivera    Subjective:     Reason for Visit: evaluate liver transplant candidacy    History of Present Illness: Alon Adamson is a 62 y.o. year old male who is being evaluated for liver transplantation.    Transplant History: N/A    Native Liver Disease (UNOS terminology): Alcoholic Cirrhosis (based on medical records from referral).    Manifestations of liver disease: ascites (diuretic-dependent), edema, encephalopathy, fatigue and portal hypertension  MELD-Na score: 18 at 1/27/2018  9:26 AM  MELD score: 17 at 1/27/2018  9:26 AM  Calculated from:  Serum Creatinine: 1.5 mg/dL at 1/27/2018  9:26 AM  Serum Sodium: 136 mmol/L at 1/27/2018  9:26 AM  Total Bilirubin: 1.9 mg/dL at 1/27/2018  9:26 AM  INR(ratio): 1.4 at 1/27/2018  9:26 AM  Age: 62 years    PMH: reviewed  PSH: reviewed    Review of Systems   Cardiovascular: Positive for leg swelling. Negative for chest pain.   Gastrointestinal: Negative for abdominal distention and abdominal pain.   Neurological: Positive for tremors and weakness.   All other systems reviewed and are negative.        Objective:     Physical Exam:  Constitutional:   Vitals reviewed: yes   Well-nourished and well-groomed: yes  Eyes:   Sclerae icteric: no   Extraocular movements intact: yes  GI:    Bowel sounds normal: yes   Tenderness: no    If yes, quadrant/location: not applicable   Palpable masses: no    If yes, quadrant/location: not applicable   Hepatosplenomegaly: no   Ascites: no   Hernia: no    If yes, type/location: not applicable   Surgical scars: yes    If yes, type/location: laparoscopic port sites  Resp:   Effort normal: yes   Breath sounds normal: yes    CV:   Regular rate and rhythm: yes   Heart sounds normal: yes   Femoral pulses normal: yes   Extremities edematous: no  Skin:   Rashes or lesions present: no    If yes,  describe:not applicable   Jaundice:: no    Musculoskeletal:   Gait normal: yes   Strength normal: yes  Psych:   Oriented to person, place, and time: yes   Affect and mood normal: yes    Additional comments: not applicable         Transplant Surgery - Candidacy   Assessment/Plan:   I see no surgical contraindication to liver transplantation. Based on available information, Alon Adamson is a suitable liver transplant candidate. Final determination of transplant candidacy will be made once evaluation is complete and reviewed by the Liver Transplant Selection Committee.    I have concerns about the PV patency since it was not seen in the doppler US and is not well defined in the most recent CT scan wo contrast. This needs to be address in selection to define the surgical complexity.     Jair Sheridan MD         Counseling: We provided Alon Adamson with a group education session today.  We discussed liver transplantation at length with him, including risks, potential complications, and alternatives in the management of his liver disease.  The discussion included complications related to anesthesia, bleeding, infection, primary nonfunction, and vascular thrombosis.  I discussed the typical postoperative course, length of hospitalization, the need for long-term immunosuppression, and the need for long-term routine follow-up.  I discussed living-donor and -donor transplantation and the relative advantages and disadvantages of each.  I also discussed average waiting times for both living donation and  donation.  I discussed national and center-specific survival rates.  I also mentioned the potential benefit of multicenter listing to candidates listed with centers within more than one organ procurement organization.  All questions were answered.    PHS: I discussed the use of organs from donors with PHS increased-risk behavior, including the testing protocols utilized, as well as data  from the literature regarding the likelihood of transmission of hepatitis or HIV.  The patient is willing to consider such grafts.  DCD: I discussed the use of organs recovered by donation after cardiac death (DCD), including slightly decreased graft survival and greater risk of arterial and biliary complications. The potential advantage to the recipient is possibly receiving a transplant sooner by accepting such an organ. The patient is willing to consider such grafts.  HBcAb: I discussed the use of organs from donors with HBcAb in conjunction with long term use of HBV antiviral drugs, such as lamivudine. The small but measurable risk of hepatitis B seroconversion was discussed as well as the potentially life long need to continue antiviral drugs. The patient is willing to consider such grafts.  LDLT: I discussed the nature of living donor liver transplant, including donor risks and more frequent recipient complications. The patient is willing to consider such grafts.

## 2018-02-19 NOTE — PROGRESS NOTES
MELD 18.  Appt card completed for pt to repeat labs ion one week and RTC to be seen by Dr. García in [place] in four weeks.

## 2018-02-21 ENCOUNTER — DOCUMENTATION ONLY (OUTPATIENT)
Dept: TRANSPLANT | Facility: CLINIC | Age: 63
End: 2018-02-21

## 2018-02-27 ENCOUNTER — DOCUMENTATION ONLY (OUTPATIENT)
Dept: TRANSPLANT | Facility: CLINIC | Age: 63
End: 2018-02-27

## 2018-02-27 LAB
EXT ALBUMIN: 2.3
EXT ALKALINE PHOSPHATASE: 117
EXT ALT: 129
EXT AST: 152
EXT BILIRUBIN DIRECT: 0.9 MG/DL
EXT BILIRUBIN TOTAL: 2.3
EXT BUN: 11
EXT CALCIUM: 8.3
EXT CHLORIDE: 107
EXT CO2: 25
EXT CREATININE: 1.1 MG/DL
EXT GFR MDRD NON AF AMER: >60
EXT GLUCOSE: 146
EXT HEMATOCRIT: 30.3
EXT HEMOGLOBIN: 10.3
EXT INR: 1.4
EXT PLATELETS: 77
EXT POTASSIUM: 3.5
EXT PROTEIN TOTAL: 6.1
EXT PT: 17.7
EXT SODIUM: 139 MMOL/L
EXT WBC: 4.6

## 2018-02-28 ENCOUNTER — TELEPHONE (OUTPATIENT)
Dept: TRANSPLANT | Facility: CLINIC | Age: 63
End: 2018-02-28

## 2018-02-28 ENCOUNTER — COMMITTEE REVIEW (OUTPATIENT)
Dept: TRANSPLANT | Facility: CLINIC | Age: 63
End: 2018-02-28

## 2018-02-28 DIAGNOSIS — Z76.82 ORGAN TRANSPLANT CANDIDATE: Primary | ICD-10-CM

## 2018-02-28 NOTE — TELEPHONE ENCOUNTER
----- Message from Kellie Gutierrez sent at 2/28/2018  3:50 PM CST -----  Contact: pt  Pt would like copy of recent blood work and wants to discuss having dye that's used to do a procedure with the condition of his kidneys    Would like coordinator to call him    Pt Contact [phone]

## 2018-02-28 NOTE — TELEPHONE ENCOUNTER
Called patient to inform him that his case was presented in liver selection com meeting on today.  Informed him that he was approved for transplant and will be listed pending repeat CT of abdomen w/ contrast to assess portal vein and financial clearance from insurance company.  He voiced understanding.  He denies any questions/ concerns at this time.  Per pt's request, appt card completed to schedule CT this Friday or next Friday.

## 2018-02-28 NOTE — COMMITTEE REVIEW
Alon Adamson's case presented to selection committee.  Patient has been accepted for liver transplant due to Alcoholic Cirrhosis and complications of end stage liver disease including hyperbilirubinemia, hypoalbuminemia, ascites, severe malnutrition, encephalopathy, splenomegaly and thrombocytopenia with a MELD score of 18.  Patient has no absolute contraindications for liver transplant.  Patient will be listed pending repeat CT of abdomen to assess portal vein and financial approval.  Will need to review images of CT in liver meeting to reassess surgical complexity.    Patient will accept HBcAb positive livers.  Patient will not accept HCVAB positive livers.  Patient will accept DCD livers.  Patient will not accept HCV TAMAR positive livers  Patient will accept HBV TAMAR positive livers  I was present and agree with the committee's conclusions.

## 2018-02-28 NOTE — TELEPHONE ENCOUNTER
Patient's phone call returned.  Answered pt's questions regarding CT scan.  Informed patient that his kidney function has returned to normal but that contrast can affect the kidneys.  Informed patient that he will have to drink water right after CT to flush dye from kidneys.  He voiced understanding.  Per his request, outside lab results mailed to patient.  Patient denies any further questions/ concerns at this time.

## 2018-03-05 ENCOUNTER — TELEPHONE (OUTPATIENT)
Dept: PHARMACY | Facility: CLINIC | Age: 63
End: 2018-03-05

## 2018-03-09 ENCOUNTER — HOSPITAL ENCOUNTER (OUTPATIENT)
Dept: RADIOLOGY | Facility: HOSPITAL | Age: 63
Discharge: HOME | End: 2018-03-09
Attending: INTERNAL MEDICINE
Payer: COMMERCIAL

## 2018-03-09 DIAGNOSIS — Z76.82 ORGAN TRANSPLANT CANDIDATE: ICD-10-CM

## 2018-03-09 PROCEDURE — 74160 CT ABDOMEN W/CONTRAST: CPT | Mod: TC,TXP

## 2018-03-09 PROCEDURE — 74160 CT ABDOMEN W/CONTRAST: CPT | Mod: 26,TXP,, | Performed by: RADIOLOGY

## 2018-03-09 PROCEDURE — 25500020 PHARM REV CODE 255: Mod: TXP | Performed by: INTERNAL MEDICINE

## 2018-03-09 RX ADMIN — IOHEXOL 100 ML: 350 INJECTION, SOLUTION INTRAVENOUS at 03:03

## 2018-03-14 ENCOUNTER — CONFERENCE (OUTPATIENT)
Dept: TRANSPLANT | Facility: CLINIC | Age: 63
End: 2018-03-14

## 2018-03-15 ENCOUNTER — TELEPHONE (OUTPATIENT)
Dept: TRANSPLANT | Facility: CLINIC | Age: 63
End: 2018-03-15

## 2018-03-15 NOTE — TELEPHONE ENCOUNTER
Called patient to inform him that his case was discussed in the liver meeting.  CT of abd reviewed by the surgeon and ok to proceed w/ liver listing once financial clearance obtained from the insurance company and updated labs drawn.  He voiced understanding.

## 2018-03-19 ENCOUNTER — TELEPHONE (OUTPATIENT)
Dept: TRANSPLANT | Facility: CLINIC | Age: 63
End: 2018-03-19

## 2018-03-19 NOTE — TELEPHONE ENCOUNTER
----- Message from Ady Ortega sent at 3/19/2018  4:51 PM CDT -----  p to pt and he told me the Izzy MARQUES Was going to se if he could get his appt moved to MARCH 23 in [facility] w/Dr. García. Told him that she was booked that day, so the next appt date and time would be April 18 @ 4pm, but he said he can only do appts on Fridays because that his when his wife his off. kathryn him an appt for April 27 9AM/10:30AM. Will mail out a copy of his appts     I told pt since these are listing labs he need to get them done ASAP, he cannot wait til April. He said that he will go tonight, or tomorrow locally. I explained why it is important to get these labs done on the day we need him to get them done. He will let his wife know.  -----------------------------------------------------------------------------------    Kellie Gutierrez sent to P [facility] Pre-Liver Transplant Non-Clinical  Caller: pt (Today,  3:39 PM)         Calling to speak with someone regarding scheduling his testing     Pt contact [phone]

## 2018-03-21 ENCOUNTER — DOCUMENTATION ONLY (OUTPATIENT)
Dept: TRANSPLANT | Facility: CLINIC | Age: 63
End: 2018-03-21

## 2018-03-21 LAB
EXT ALBUMIN: 2.3 (ref 3.4–5)
EXT ALKALINE PHOSPHATASE: 140 (ref 45–117)
EXT ALT: 51 (ref 12–78)
EXT AST: 87 (ref 3–37)
EXT BILIRUBIN TOTAL: 1.7 (ref 0.1–1.1)
EXT BUN: 11 (ref 5–18)
EXT CALCIUM: 8.1 (ref 8.5–10.3)
EXT CHLORIDE: 108 (ref 98–108)
EXT CREATININE: 1.3 (ref 0.6–1.3)
EXT HEMATOCRIT: 31.6 (ref 38.8–51.6)
EXT HEMOGLOBIN: 10.6 (ref 13–17.2)
EXT INR: 1.48
EXT PLATELETS: 61 (ref 130–400)
EXT POTASSIUM: 4.1 (ref 3.5–5.1)
EXT PROTEIN TOTAL: 6.5 (ref 6.4–8.3)
EXT PT: 18.5 (ref 12.1–15.2)
EXT RBC: 3.27 (ref 4.5–6.1)
EXT SODIUM: 137 (ref 136–148)
EXT WBC: 5.5 (ref 4.1–10.6)

## 2018-03-23 ENCOUNTER — OFFICE VISIT (OUTPATIENT)
Dept: GASTROENTEROLOGY | Facility: CLINIC | Age: 63
End: 2018-03-23
Payer: COMMERCIAL

## 2018-03-23 ENCOUNTER — LAB VISIT (OUTPATIENT)
Dept: LAB | Facility: HOSPITAL | Age: 63
End: 2018-03-23
Attending: INTERNAL MEDICINE
Payer: COMMERCIAL

## 2018-03-23 VITALS
SYSTOLIC BLOOD PRESSURE: 138 MMHG | HEART RATE: 80 BPM | DIASTOLIC BLOOD PRESSURE: 72 MMHG | WEIGHT: 308.63 LBS | BODY MASS INDEX: 39.61 KG/M2 | HEIGHT: 74 IN

## 2018-03-23 DIAGNOSIS — K74.69 OTHER CIRRHOSIS OF LIVER: ICD-10-CM

## 2018-03-23 DIAGNOSIS — R14.0 ABDOMINAL BLOATING: ICD-10-CM

## 2018-03-23 DIAGNOSIS — K74.60 DECOMPENSATED HEPATIC CIRRHOSIS: ICD-10-CM

## 2018-03-23 DIAGNOSIS — Z76.82 ORGAN TRANSPLANT CANDIDATE: ICD-10-CM

## 2018-03-23 DIAGNOSIS — R60.0 BILATERAL LEG EDEMA: Primary | ICD-10-CM

## 2018-03-23 DIAGNOSIS — K72.90 DECOMPENSATED HEPATIC CIRRHOSIS: ICD-10-CM

## 2018-03-23 DIAGNOSIS — K70.31 ASCITES DUE TO ALCOHOLIC CIRRHOSIS: ICD-10-CM

## 2018-03-23 PROBLEM — D62 ACUTE BLOOD LOSS ANEMIA: Status: RESOLVED | Noted: 2018-01-22 | Resolved: 2018-03-23

## 2018-03-23 LAB
ALBUMIN SERPL BCP-MCNC: 2.4 G/DL
ALP SERPL-CCNC: 143 U/L
ALT SERPL W/O P-5'-P-CCNC: 44 U/L
ANION GAP SERPL CALC-SCNC: 6 MMOL/L
AST SERPL-CCNC: 93 U/L
BASOPHILS # BLD AUTO: 0.04 K/UL
BASOPHILS NFR BLD: 0.8 %
BILIRUB SERPL-MCNC: 2.5 MG/DL
BUN SERPL-MCNC: 10 MG/DL
CALCIUM SERPL-MCNC: 8.5 MG/DL
CHLORIDE SERPL-SCNC: 106 MMOL/L
CO2 SERPL-SCNC: 24 MMOL/L
CREAT SERPL-MCNC: 1.4 MG/DL
DIFFERENTIAL METHOD: ABNORMAL
EOSINOPHIL # BLD AUTO: 0.1 K/UL
EOSINOPHIL NFR BLD: 1.9 %
ERYTHROCYTE [DISTWIDTH] IN BLOOD BY AUTOMATED COUNT: 15 %
EST. GFR  (AFRICAN AMERICAN): >60 ML/MIN/1.73 M^2
EST. GFR  (NON AFRICAN AMERICAN): 54 ML/MIN/1.73 M^2
GLUCOSE SERPL-MCNC: 157 MG/DL
HCT VFR BLD AUTO: 30.7 %
HGB BLD-MCNC: 10.5 G/DL
INR PPP: 1.5
LYMPHOCYTES # BLD AUTO: 1.1 K/UL
LYMPHOCYTES NFR BLD: 23.4 %
MCH RBC QN AUTO: 32 PG
MCHC RBC AUTO-ENTMCNC: 34.2 G/DL
MCV RBC AUTO: 94 FL
MONOCYTES # BLD AUTO: 0.5 K/UL
MONOCYTES NFR BLD: 10.1 %
NEUTROPHILS # BLD AUTO: 3 K/UL
NEUTROPHILS NFR BLD: 63.8 %
PLATELET # BLD AUTO: 69 K/UL
PMV BLD AUTO: 11.7 FL
POTASSIUM SERPL-SCNC: 3.9 MMOL/L
PROT SERPL-MCNC: 6.1 G/DL
PROTHROMBIN TIME: 15.4 SEC
RBC # BLD AUTO: 3.28 M/UL
SODIUM SERPL-SCNC: 136 MMOL/L
WBC # BLD AUTO: 4.75 K/UL

## 2018-03-23 PROCEDURE — 99214 OFFICE O/P EST MOD 30 MIN: CPT | Mod: NTX,S$GLB,, | Performed by: INTERNAL MEDICINE

## 2018-03-23 PROCEDURE — 99999 PR PBB SHADOW E&M-EST. PATIENT-LVL III: CPT | Mod: PBBFAC,TXP,, | Performed by: INTERNAL MEDICINE

## 2018-03-23 PROCEDURE — 85025 COMPLETE CBC W/AUTO DIFF WBC: CPT | Mod: PO,TXP

## 2018-03-23 PROCEDURE — 36415 COLL VENOUS BLD VENIPUNCTURE: CPT | Mod: PO,TXP

## 2018-03-23 PROCEDURE — 80053 COMPREHEN METABOLIC PANEL: CPT | Mod: PO,TXP

## 2018-03-23 PROCEDURE — 85610 PROTHROMBIN TIME: CPT | Mod: PO,TXP

## 2018-03-23 RX ORDER — FUROSEMIDE 80 MG/1
80 TABLET ORAL 2 TIMES DAILY
Qty: 60 TABLET | Refills: 2
Start: 2018-03-23 | End: 2018-09-07

## 2018-03-23 RX ORDER — SPIRONOLACTONE 100 MG/1
200 TABLET, FILM COATED ORAL 2 TIMES DAILY
Qty: 60 TABLET | Refills: 2 | Status: ON HOLD | OUTPATIENT
Start: 2018-03-23 | End: 2018-08-17 | Stop reason: HOSPADM

## 2018-03-23 NOTE — PROGRESS NOTES
Subjective:     Alon Adamson is here for follow up of cirrhosis.    HPI  Since Alon Adamson's last visit the main issues have been: Admission to [facility] for volume overload.  Required a paracentesis at that time with 3 L of fluid removed.  He also has significant lower extremity edema and shortness of breath.  He reports that when he was discharged a lot of volume left on him.  There is concern for melena and anemia at that time.  He did have an EGD and colonoscopy which overall unremarkable for source.  He denies any melena or GI bleeding at this time.  During that admission he also issues with acute kidney injury which has since resolved.    Ascites-uncertain  Encephalopathy-none  GI bleeding-none    Review of Systems   Constitutional: Positive for activity change and fatigue.   HENT: Negative.    Eyes: Negative.    Respiratory: Positive for shortness of breath.    Cardiovascular: Positive for leg swelling.   Gastrointestinal: Positive for abdominal distention (significant issues with gas and bloating).   Genitourinary: Negative.    Musculoskeletal: Negative.    Skin: Negative.    Neurological: Negative.    Psychiatric/Behavioral: Negative.        Objective:     Physical Exam   Constitutional: He is oriented to person, place, and time. No distress.   HENT:   Head: Normocephalic and atraumatic.   Mouth/Throat: Oropharynx is clear and moist. No oropharyngeal exudate.   Eyes: Conjunctivae are normal. Pupils are equal, round, and reactive to light. Right eye exhibits no discharge. Left eye exhibits no discharge. No scleral icterus.   Pulmonary/Chest: No respiratory distress. He has no wheezes.   Slightly labored   Abdominal: Soft. He exhibits no distension (difficult to assess). There is no tenderness.   Musculoskeletal: He exhibits edema (significant BLE).   Neurological: He is alert and oriented to person, place, and time.   Psychiatric: He has a normal mood and affect. His behavior is normal.    Vitals reviewed.      CT 3/2018  Hepatic cirrhosis with sequelae of portal hypertension such as splenomegaly, abdominal ascites, and enlarged upper abdominal venous collateral vessels.    Stable bilateral nonobstructing nephrolithiasis.    Trace left pleural effusion.    EGD 1/2018  - Small (< 5 mm) esophageal varices.                        - Erythematous mucosa in the stomach.                        - Normal examined duodenum.    Colon 1/25/2018  - Hemorrhoids and possible rectal prolapse found on                         perianal exam.                        - Friability with contact bleeding in the entire                         examined colon.                        - One 7 mm polyp in the cecum. Not removed.                        - Small lipoma in the transverse colon.                        - Small lipoma in the ascending colon.                        - Diverticulosis in the sigmoid colon, in the                         transverse colon and in the ascending colon.                        - Non-bleeding internal hemorrhoids and rectal                         varices.    MELD-Na score: 19 at 3/23/2018  9:47 AM  MELD score: 18 at 3/23/2018  9:47 AM  Calculated from:  Serum Creatinine: 1.4 mg/dL at 3/23/2018  9:47 AM  Serum Sodium: 136 mmol/L at 3/23/2018  9:47 AM  Total Bilirubin: 2.5 mg/dL at 3/23/2018  9:47 AM  INR(ratio): 1.5 at 3/23/2018  9:47 AM  Age: 63 years    WBC   Date Value Ref Range Status   03/23/2018 4.75 3.90 - 12.70 K/uL Final     Hemoglobin   Date Value Ref Range Status   03/23/2018 10.5 (L) 14.0 - 18.0 g/dL Final     Hematocrit   Date Value Ref Range Status   03/23/2018 30.7 (L) 40.0 - 54.0 % Final     Platelets   Date Value Ref Range Status   03/23/2018 69 (L) 150 - 350 K/uL Final     BUN, Bld   Date Value Ref Range Status   03/23/2018 10 8 - 23 mg/dL Final     Creatinine   Date Value Ref Range Status   03/23/2018 1.4 0.5 - 1.4 mg/dL Final     Glucose   Date Value Ref Range Status    03/23/2018 157 (H) 70 - 110 mg/dL Final     Calcium   Date Value Ref Range Status   03/23/2018 8.5 (L) 8.7 - 10.5 mg/dL Final     Sodium   Date Value Ref Range Status   03/23/2018 136 136 - 145 mmol/L Final     Potassium   Date Value Ref Range Status   03/23/2018 3.9 3.5 - 5.1 mmol/L Final     Chloride   Date Value Ref Range Status   03/23/2018 106 95 - 110 mmol/L Final     Magnesium   Date Value Ref Range Status   01/27/2018 2.3 1.6 - 2.6 mg/dL Final     AST   Date Value Ref Range Status   03/23/2018 93 (H) 10 - 40 U/L Final     ALT   Date Value Ref Range Status   03/23/2018 44 10 - 44 U/L Final     Alkaline Phosphatase   Date Value Ref Range Status   03/23/2018 143 (H) 55 - 135 U/L Final     Total Bilirubin   Date Value Ref Range Status   03/23/2018 2.5 (H) 0.1 - 1.0 mg/dL Final     Comment:     For infants and newborns, interpretation of results should be based  on gestational age, weight and in agreement with clinical  observations.  Premature Infant recommended reference ranges:  Up to 24 hours.............<8.0 mg/dL  Up to 48 hours............<12.0 mg/dL  3-5 days..................<15.0 mg/dL  6-29 days.................<15.0 mg/dL       Albumin   Date Value Ref Range Status   03/23/2018 2.4 (L) 3.5 - 5.2 g/dL Final     INR   Date Value Ref Range Status   03/23/2018 1.5 (H) 0.8 - 1.2 Final     Comment:     Coumadin Therapy:  2.0 - 3.0 for INR for all indicators except mechanical heart valves  and antiphospholipid syndromes which should use 2.5 - 3.5.           Assessment/Plan:     1. Bilateral leg edema    2. Other cirrhosis of liver    3. Abdominal bloating      Alon Adamson is a 63 y.o. male withAscites and Shortness of Breath    Bilateral leg edema-still significant volume overload.  We'll try to increase diuretics.  -We'll maximize Lasix and Aldactone but need to monitor kidney function closely  -Repeat BMP next week, external orders given to patient  -     spironolactone (ALDACTONE) 100 MG tablet;  Take 2 tablets (200 mg total) by mouth 2 (two) times daily.  Dispense: 60 tablet; Refill: 2  -     furosemide (LASIX) 80 MG tablet; Take 1 tablet (80 mg total) by mouth 2 (two) times daily.  Dispense: 60 tablet; Refill: 2  -     Basic metabolic panel; Future; Expected date: 03/23/2018    Other cirrhosis of liver-decompensated, elevated meld score.  Patient was approved for transplant still awaiting listing.  -Continue HCC surveillance-9/2018  -Continue variceal surveillance- repeat 1/2019  -     spironolactone (ALDACTONE) 100 MG tablet; Take 2 tablets (200 mg total) by mouth 2 (two) times daily.  Dispense: 60 tablet; Refill: 2  -     furosemide (LASIX) 80 MG tablet; Take 1 tablet (80 mg total) by mouth 2 (two) times daily.  Dispense: 60 tablet; Refill: 2  -     Comprehensive metabolic panel; Future; Expected date: 03/23/2018  -     CBC auto differential; Future; Expected date: 03/23/2018  -     Protime-INR; Future; Expected date: 03/23/2018    Abdominal bloating-likely related to lactulose  -Okay to try Gas-X      Return to clinic in 4 weeks to follow volume status    Patient was seen in the liver transplant department at [facility].      Miriam García MD

## 2018-04-02 ENCOUNTER — TELEPHONE (OUTPATIENT)
Dept: PHARMACY | Facility: CLINIC | Age: 63
End: 2018-04-02

## 2018-04-03 ENCOUNTER — DOCUMENTATION ONLY (OUTPATIENT)
Dept: TRANSPLANT | Facility: CLINIC | Age: 63
End: 2018-04-03

## 2018-04-03 ENCOUNTER — TELEPHONE (OUTPATIENT)
Dept: TRANSPLANT | Facility: CLINIC | Age: 63
End: 2018-04-03

## 2018-04-03 LAB
EXT BUN: 13
EXT CALCIUM: 8.5
EXT CHLORIDE: 104
EXT CO2: 25
EXT CREATININE: 1.5 MG/DL
EXT GFR MDRD NON AF AMER: 47
EXT GLUCOSE: 139
EXT POTASSIUM: 3.9
EXT SODIUM: 137 MMOL/L

## 2018-04-03 NOTE — TELEPHONE ENCOUNTER
Patient given update.  Informed patient that his transplant listing has been declined by the insurance company for several reasons.  Informed him that this decision will be appealed.  Informed patient that the requested information has been faxed or explanation provided of why it was not done.  Also informed him that a peer to peer review between Dr. García and the medical director with the insurance company will be done.  Informed patient that he will be notified of the outcome.  He voiced understanding.

## 2018-04-13 ENCOUNTER — TELEPHONE (OUTPATIENT)
Dept: GASTROENTEROLOGY | Facility: CLINIC | Age: 63
End: 2018-04-13

## 2018-04-13 NOTE — TELEPHONE ENCOUNTER
Insurance Calling to speak with Dr. Miriam García in regards to the peer to peer reconsideration listing for the pt, she stated she did receive the new fax that was sent over on 4/3/18 but she needed to touch bases with her for the remaining items left on the list.       Call back number: [phone]

## 2018-04-13 NOTE — TELEPHONE ENCOUNTER
Spoke with patient and wife and explained the situation as it relates to the need for the right heart catheterization.  They're okay with proceeding.  We'll try to see if we can schedule for soon as possible so that we can then try to activate him on the list.  They express understanding and agreement with moving forward.

## 2018-04-13 NOTE — TELEPHONE ENCOUNTER
Case discussed with coordinator in plan is to move forward with right heart catheter given concerns of insurance company as well as cardiology.

## 2018-04-19 DIAGNOSIS — K76.9 LIVER DISEASE: Primary | ICD-10-CM

## 2018-04-25 ENCOUNTER — TELEPHONE (OUTPATIENT)
Dept: PHARMACY | Facility: CLINIC | Age: 63
End: 2018-04-25

## 2018-04-25 DIAGNOSIS — K74.60 CIRRHOSIS OF LIVER: Primary | ICD-10-CM

## 2018-04-25 DIAGNOSIS — I51.9 HEART DISEASE: ICD-10-CM

## 2018-04-26 ENCOUNTER — HOSPITAL ENCOUNTER (OUTPATIENT)
Facility: HOSPITAL | Age: 63
Discharge: HOME | End: 2018-04-26
Attending: INTERNAL MEDICINE | Admitting: INTERNAL MEDICINE
Payer: COMMERCIAL

## 2018-04-26 ENCOUNTER — LAB VISIT (OUTPATIENT)
Dept: LAB | Facility: HOSPITAL | Age: 63
End: 2018-04-26
Attending: INTERNAL MEDICINE
Payer: COMMERCIAL

## 2018-04-26 DIAGNOSIS — K76.9 LIVER DISEASE: ICD-10-CM

## 2018-04-26 PROBLEM — Z01.810 PREOPERATIVE CARDIOVASCULAR EXAMINATION: Status: ACTIVE | Noted: 2018-04-26

## 2018-04-26 LAB
ALBUMIN SERPL BCP-MCNC: 2.4 G/DL
ALP SERPL-CCNC: 125 U/L
ALT SERPL W/O P-5'-P-CCNC: 41 U/L
ANION GAP SERPL CALC-SCNC: 8 MMOL/L
AST SERPL-CCNC: 56 U/L
BASOPHILS # BLD AUTO: 0.05 K/UL
BASOPHILS NFR BLD: 1.3 %
BILIRUB SERPL-MCNC: 3.1 MG/DL
BUN SERPL-MCNC: 12 MG/DL
CALCIUM SERPL-MCNC: 8.6 MG/DL
CHLORIDE SERPL-SCNC: 104 MMOL/L
CO2 SERPL-SCNC: 22 MMOL/L
CREAT SERPL-MCNC: 1.4 MG/DL
DIFFERENTIAL METHOD: ABNORMAL
EOSINOPHIL # BLD AUTO: 0.1 K/UL
EOSINOPHIL NFR BLD: 2.9 %
ERYTHROCYTE [DISTWIDTH] IN BLOOD BY AUTOMATED COUNT: 16.2 %
EST. GFR  (AFRICAN AMERICAN): >60 ML/MIN/1.73 M^2
EST. GFR  (NON AFRICAN AMERICAN): 53.5 ML/MIN/1.73 M^2
GLUCOSE SERPL-MCNC: 131 MG/DL
HCT VFR BLD AUTO: 32.4 %
HGB BLD-MCNC: 10.9 G/DL
IMM GRANULOCYTES # BLD AUTO: 0.01 K/UL
IMM GRANULOCYTES NFR BLD AUTO: 0.3 %
INR PPP: 1.5
LYMPHOCYTES # BLD AUTO: 0.7 K/UL
LYMPHOCYTES NFR BLD: 18.3 %
MCH RBC QN AUTO: 31.9 PG
MCHC RBC AUTO-ENTMCNC: 33.6 G/DL
MCV RBC AUTO: 95 FL
MONOCYTES # BLD AUTO: 0.8 K/UL
MONOCYTES NFR BLD: 20.7 %
NEUTROPHILS # BLD AUTO: 2.2 K/UL
NEUTROPHILS NFR BLD: 56.5 %
NRBC BLD-RTO: 0 /100 WBC
PLATELET # BLD AUTO: 71 K/UL
PMV BLD AUTO: 11.1 FL
POTASSIUM SERPL-SCNC: 3.6 MMOL/L
PROT SERPL-MCNC: 6.5 G/DL
PROTHROMBIN TIME: 14.9 SEC
RBC # BLD AUTO: 3.42 M/UL
SODIUM SERPL-SCNC: 134 MMOL/L
WBC # BLD AUTO: 3.82 K/UL

## 2018-04-26 PROCEDURE — C1894 INTRO/SHEATH, NON-LASER: HCPCS | Mod: TXP

## 2018-04-26 PROCEDURE — 85610 PROTHROMBIN TIME: CPT | Mod: TXP

## 2018-04-26 PROCEDURE — 25000003 PHARM REV CODE 250: Mod: TXP

## 2018-04-26 PROCEDURE — 36415 COLL VENOUS BLD VENIPUNCTURE: CPT | Mod: TXP

## 2018-04-26 PROCEDURE — 80053 COMPREHEN METABOLIC PANEL: CPT | Mod: TXP

## 2018-04-26 PROCEDURE — 93451 RIGHT HEART CATH: CPT | Mod: 26,TXP,, | Performed by: INTERNAL MEDICINE

## 2018-04-26 PROCEDURE — 85025 COMPLETE CBC W/AUTO DIFF WBC: CPT | Mod: TXP

## 2018-04-26 NOTE — DISCHARGE INSTRUCTIONS
AFTER THE PROCEDURE:   -DO NOT DRINK OR EAT ANYTHING UNTIL NO LONGER HOARSE   -You may remove the bandage in 24 hours and wash with soap and water.   -You may shower, but do not soak in a tub for three days.   PRECAUTIONS FOR THE NEXT 24 HOURS:   -If you need to cough, sneeze, have a bowel movement, or bear down, hold pressure over your bandage.   -Do not  anything heavier than a gallon of milk(about 5 pounds)   -Avoid excessive bending over.   SYMPTOMS TO WATCH FOR AND REPORT TO YOUR DOCTOR:   -BLEEDING: hold pressure over the site until bleeding stops. Proceed to Emergency Room by ambulance (do not drive yourself) if unable to stop bleeding. Notify your doctor.   -HEMATOMA(hard bruise under the skin): Chava around the bruise if one develops. Call your doctor if it increases in size or if you have difficulty talking, swallowing, breathing or anything unusual.   SIGNS OF INFECTION:Fever (temperature over 100.5 F), pus or redness   -RASH   -CHEST PAIN OR SHORTNESS OF BREATH   You may call you coordinator in the Heart Failure/Heart Transplant/Pulmonary Hypertension Clinic at [phone] during normal business hours(Monday through Friday from 8 A.M. to 5 P.M.) After hours, call the Heart Transplant Service doctor on call at [phone].

## 2018-04-26 NOTE — H&P
62 yo WM known to me from initial consult 2/16/18 at which time I noted he has known CAD, cirrhosis, DM and lipid disorder was referred for cardiac clearance prior to liver transplant.  On 12/7/2016 Dr. Marin performed right and left heart cath that demonstrated 40% mid LAD, distal LAD 70%, proximal OM 1 40%, proximal RCA 50%, right PDA 70% and he recommended medical therapy.  At that time RHC performed and wedge press 15, PA 37/13 (calc mean 21), RA 9, Estephania CO 8.5 L/min (very high due to liver disease presumably) with calculated PVR of 0.7 Wood units.       On 12/1/17 abnormal PET stress lead to repeat angiogram 3/16/17.  At that time angiogram demonstrated distal LAD has a 75% stenosis, normal LCFx and RCA, 75% ostial PDA.  Dr. Marin performed coronary intervention with distal LAD reduced to 0% using Stent Integrity 2.5 X 14, the ostial PDA lesion reduced to 0% using Stent Integrity 2.5 X 14.       He has done fine and denies any No tightness, pressure or heaviness in chest, neck, arms, throat, jaw or back with or without exertion.  Some SOB but major limitation is generalized weakness.       He underwent ECHO 1/23/18 read by Dr. Kirkpatrick:    1 - Normal left ventricular systolic function (EF 60-65%).     2 - Biatrial enlargement.     3 - Eccentric hypertrophy.     4 - Normal left ventricular diastolic function.     5 - Normal right ventricular systolic function .     6 - The estimated PA systolic pressure is greater than 30 mmHg. She could not estimate CVP as IVC not adequately visualized.  PA systolic pressure would be CVP + 30 mm Hg.    7 - Mild tricuspid regurgitation.     On 1/26/18 he underwent  stress echo read by Dr. Xavier which demonstrated no ischemia at peak  (89%) predicted.  The TR velocity is not routinely measured nor is IVC for stress ECHO and neither were visualized so no PA pressure estimate possible (other study done 3 days earlier).    He was referred at this time for RHC for definite  answer re: PA pressure.      Past History and Operation reviewed--see my complete note of 2/16/18  Meds reviewed  Allergies reviewed      VS per nurse   JVP does not appear elevated  S1 and 2 normal with 1-2/6 systolic murmur noted base and LSB sitting  Lungs clear  Large legs with old edema changes    Lab Results   Component Value Date     (L) 04/26/2018    K 3.6 04/26/2018     04/26/2018    CO2 22 (L) 04/26/2018    BUN 12 04/26/2018    CREATININE 1.4 04/26/2018     (H) 04/26/2018    AST 56 (H) 04/26/2018    ALT 41 04/26/2018    ALBUMIN 2.4 (L) 04/26/2018    PROT 6.5 04/26/2018    BILITOT 3.1 (H) 04/26/2018    WBC 3.82 (L) 04/26/2018    HGB 10.9 (L) 04/26/2018    HCT 32.4 (L) 04/26/2018    PLT 71 (L) 04/26/2018    INR 1.5 (H) 04/26/2018       ASSES:    1. Preoperative cardiovascular examination sent for RHC     2. Coronary artery disease involving native coronary artery of native heart without angina pectoris    3. Cirrhosis of liver with ascites, unspecified hepatic cirrhosis type    4. Hyperlipidemia LDL goal <70    5. Bilateral edema of lower extremity      PLAN:  Proceed with RHC as requested  This procedure has been fully reviewed with the patient and written informed consent has been obtained.

## 2018-04-26 NOTE — DISCHARGE SUMMARY
[facility]  Discharge Note  Short Stay    Admit Date: 4/26/2018    Discharge Date and Time: 4/26/2018    Attending Physician: Thomas Marin Jr.,*     Discharge Provider: Thomas Marin Jr    Diagnoses:  Active Hospital Problems    Diagnosis  POA    Preoperative cardiovascular examination [Z01.810]  Not Applicable    Pancytopenia [D61.818]  Yes    Coronary artery disease involving native coronary artery of native heart without angina pectoris [I25.10]  Yes    Thrombocytopenia [D69.6]  Yes    Bilateral edema of lower extremity [R60.0]  Yes      Resolved Hospital Problems    Diagnosis Date Resolved POA    *Pulmonary hypertension, mild [I27.20] 04/26/2018 Yes       Discharged Condition: stable    Hospital Course: Patient was admitted for an outpatient procedure and tolerated the procedure well with no complications.    Final Diagnoses: Same as principal problem.    Disposition: Home or Self Care    Follow up/Patient Instructions:    Medications:  Reconciled Home Medications:      Medication List      CHANGE how you take these medications    fish oil-omega-3 fatty acids 300-1,000 mg capsule  Take 2 capsules (2 g total) by mouth once daily.  What changed:  when to take this     GENERLAC 10 gram/15 mL solution  Generic drug:  lactulose  Take 60 mLs (40 g total) by mouth 3 (three) times daily. 60mg three times per day  What changed:  · how much to take  · additional instructions        CONTINUE taking these medications    ascorbic acid (vitamin C) 1000 MG tablet  Commonly known as:  VITAMIN C  Take 1,000 mg by mouth once daily.     aspirin 81 MG EC tablet  Commonly known as:  ECOTRIN  Take 1 tablet (81 mg total) by mouth once daily.     atorvastatin 40 MG tablet  Commonly known as:  LIPITOR  TAKE 1 TABLET(40 MG) BY MOUTH EVERY DAY     docusate sodium 100 MG capsule  Commonly known as:  COLACE  Take 100 mg by mouth once daily.     ferrous gluconate 270 mg (27 mg iron) Tab  Take 1 tablet by mouth  once daily.     furosemide 80 MG tablet  Commonly known as:  LASIX  Take 1 tablet (80 mg total) by mouth 2 (two) times daily.     levETIRAcetam 500 MG Tab  Commonly known as:  KEPPRA  Take 1 tablet (500 mg total) by mouth 2 (two) times daily.     magnesium oxide 400 mg tablet  Commonly known as:  MAG-OX  Take 1 tablet (400 mg total) by mouth once daily.     ondansetron 4 MG tablet  Commonly known as:  ZOFRAN  Take 1 tablet (4 mg total) by mouth every 8 (eight) hours as needed for Nausea.     pantoprazole 40 MG tablet  Commonly known as:  PROTONIX  Take 1 tablet (40 mg total) by mouth 2 (two) times daily before meals.     rifAXIMin 550 mg Tab  Commonly known as:  XIFAXAN  Take 1 tablet (550 mg total) by mouth 2 (two) times daily.     spironolactone 100 MG tablet  Commonly known as:  ALDACTONE  Take 2 tablets (200 mg total) by mouth 2 (two) times daily.     traMADol 50 mg tablet  Commonly known as:  ULTRAM  Take 50 mg by mouth every 6 (six) hours as needed for Pain.          No discharge procedures on file.  Follow-up Information     Thomas Marin Jr, MD.    Specialties:  Transplant, Cardiology  Why:  As needed  Contact information:  [address] [facility] [address] [phone]                 Resume previous diet and activity; continue f/u with your physicians as directed prior to this procedure

## 2018-05-09 ENCOUNTER — TELEPHONE (OUTPATIENT)
Dept: TRANSPLANT | Facility: CLINIC | Age: 63
End: 2018-05-09

## 2018-05-09 NOTE — TELEPHONE ENCOUNTER
Called patient to inform him that financial clearance has been obtained from the insurance company to proceed w/ liver listing.  Informed him that updated labs needed prior to listing.  He voiced understanding.  Labs scheduled 5/18 w/ f/u visit as requested.

## 2018-05-16 ENCOUNTER — DOCUMENTATION ONLY (OUTPATIENT)
Dept: TRANSPLANT | Facility: CLINIC | Age: 63
End: 2018-05-16

## 2018-05-16 NOTE — NURSING
PHARM.D. PRE-TRANSPLANT NOTE:    This patient's medication therapy was evaluated as part of his pre-transplant evaluation.    The following pharmacologic concerns were noted: aspirin 81mg    This patient's medication profile was reviewed for contraindications for DAA Hepatitis C therapy:    [x]  No current inducers of CYP 3A4 or PGP  [x]  No amiodarone on this patient's EMR profile in the last 24 months  [x]  No past or current atrial fibrillation on this patient's EMR profile       Current Outpatient Prescriptions   Medication Sig Dispense Refill    ascorbic acid (VITAMIN C) 1000 MG tablet Take 1,000 mg by mouth once daily.      aspirin (ECOTRIN) 81 MG EC tablet Take 1 tablet (81 mg total) by mouth once daily. 90 tablet 3    atorvastatin (LIPITOR) 40 MG tablet TAKE 1 TABLET(40 MG) BY MOUTH EVERY DAY 30 tablet 0    docusate sodium (COLACE) 100 MG capsule Take 100 mg by mouth once daily.       ferrous gluconate 270 mg (27 mg iron) Tab Take 1 tablet by mouth once daily.      fish oil-omega-3 fatty acids 300-1,000 mg capsule Take 2 capsules (2 g total) by mouth once daily. (Patient taking differently: Take 2 g by mouth 2 (two) times daily. )      furosemide (LASIX) 80 MG tablet Take 1 tablet (80 mg total) by mouth 2 (two) times daily. 60 tablet 2    GENERLAC 10 gram/15 mL solution Take 60 mLs (40 g total) by mouth 3 (three) times daily. 60mg three times per day (Patient taking differently: Take 60 mLs by mouth 3 (three) times daily. ) 5400 mL 6    levETIRAcetam (KEPPRA) 500 MG Tab Take 1 tablet (500 mg total) by mouth 2 (two) times daily. 180 tablet 1    magnesium oxide (MAG-OX) 400 mg tablet Take 1 tablet (400 mg total) by mouth once daily.  0    ondansetron (ZOFRAN) 4 MG tablet Take 1 tablet (4 mg total) by mouth every 8 (eight) hours as needed for Nausea. 15 tablet 3    pantoprazole (PROTONIX) 40 MG tablet Take 1 tablet (40 mg total) by mouth 2 (two) times daily before meals. 60 tablet 11    rifAXIMin  (XIFAXAN) 550 mg Tab Take 1 tablet (550 mg total) by mouth 2 (two) times daily. 60 tablet 5    spironolactone (ALDACTONE) 100 MG tablet Take 2 tablets (200 mg total) by mouth 2 (two) times daily. 60 tablet 2    tramadol (ULTRAM) 50 mg tablet Take 50 mg by mouth every 6 (six) hours as needed for Pain.       No current facility-administered medications for this visit.          Currently  [facility] is responsible for preparing / administering this patient's medications on a daily basis.  I am available for consultation and can be contacted, as needed by the other members of the Liver Transplant team.

## 2018-05-22 ENCOUNTER — TELEPHONE (OUTPATIENT)
Dept: GASTROENTEROLOGY | Facility: CLINIC | Age: 63
End: 2018-05-22
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
## 2018-05-24 ENCOUNTER — TELEPHONE (OUTPATIENT)
Dept: GASTROENTEROLOGY | Facility: CLINIC | Age: 63
End: 2018-05-24

## 2018-05-24 NOTE — TELEPHONE ENCOUNTER
----- Message from Lillian Gracia sent at 5/24/2018  9:24 AM CDT -----  Contact: pt  States he's waiting on a call back for an appt and some blood work. Please call pt at [phone]. Thank you

## 2018-05-31 ENCOUNTER — OFFICE VISIT (OUTPATIENT)
Dept: GASTROENTEROLOGY | Facility: CLINIC | Age: 63
End: 2018-05-31
Payer: COMMERCIAL

## 2018-05-31 ENCOUNTER — LAB VISIT (OUTPATIENT)
Dept: LAB | Facility: HOSPITAL | Age: 63
End: 2018-05-31
Attending: INTERNAL MEDICINE
Payer: COMMERCIAL

## 2018-05-31 VITALS
SYSTOLIC BLOOD PRESSURE: 150 MMHG | DIASTOLIC BLOOD PRESSURE: 70 MMHG | HEIGHT: 74 IN | HEART RATE: 80 BPM | WEIGHT: 297.63 LBS | BODY MASS INDEX: 38.2 KG/M2

## 2018-05-31 DIAGNOSIS — R60.0 BILATERAL EDEMA OF LOWER EXTREMITY: ICD-10-CM

## 2018-05-31 DIAGNOSIS — K76.82 HEPATIC ENCEPHALOPATHY: ICD-10-CM

## 2018-05-31 DIAGNOSIS — K74.60 DECOMPENSATED HEPATIC CIRRHOSIS: Primary | ICD-10-CM

## 2018-05-31 DIAGNOSIS — K74.69 OTHER CIRRHOSIS OF LIVER: ICD-10-CM

## 2018-05-31 DIAGNOSIS — K72.90 DECOMPENSATED HEPATIC CIRRHOSIS: Primary | ICD-10-CM

## 2018-05-31 LAB
ALBUMIN SERPL BCP-MCNC: 2.1 G/DL
ALP SERPL-CCNC: 111 U/L
ALT SERPL W/O P-5'-P-CCNC: 38 U/L
ANION GAP SERPL CALC-SCNC: 9 MMOL/L
AST SERPL-CCNC: 51 U/L
BASOPHILS # BLD AUTO: 0.03 K/UL
BASOPHILS NFR BLD: 0.7 %
BILIRUB SERPL-MCNC: 2.3 MG/DL
BUN SERPL-MCNC: 16 MG/DL
CALCIUM SERPL-MCNC: 8.9 MG/DL
CHLORIDE SERPL-SCNC: 107 MMOL/L
CO2 SERPL-SCNC: 19 MMOL/L
CREAT SERPL-MCNC: 1.6 MG/DL
DIFFERENTIAL METHOD: ABNORMAL
EOSINOPHIL # BLD AUTO: 0.1 K/UL
EOSINOPHIL NFR BLD: 1.1 %
ERYTHROCYTE [DISTWIDTH] IN BLOOD BY AUTOMATED COUNT: 17.1 %
EST. GFR  (AFRICAN AMERICAN): 53 ML/MIN/1.73 M^2
EST. GFR  (NON AFRICAN AMERICAN): 45 ML/MIN/1.73 M^2
GLUCOSE SERPL-MCNC: 178 MG/DL
HCT VFR BLD AUTO: 28.2 %
HGB BLD-MCNC: 9.8 G/DL
INR PPP: 1.5
LYMPHOCYTES # BLD AUTO: 1.2 K/UL
LYMPHOCYTES NFR BLD: 26.9 %
MCH RBC QN AUTO: 32.5 PG
MCHC RBC AUTO-ENTMCNC: 34.8 G/DL
MCV RBC AUTO: 93 FL
MONOCYTES # BLD AUTO: 0.3 K/UL
MONOCYTES NFR BLD: 6.4 %
NEUTROPHILS # BLD AUTO: 2.8 K/UL
NEUTROPHILS NFR BLD: 64.9 %
PLATELET # BLD AUTO: 64 K/UL
PMV BLD AUTO: 10 FL
POTASSIUM SERPL-SCNC: 3.6 MMOL/L
PROT SERPL-MCNC: 5.5 G/DL
PROTHROMBIN TIME: 15.4 SEC
RBC # BLD AUTO: 3.02 M/UL
SODIUM SERPL-SCNC: 135 MMOL/L
WBC # BLD AUTO: 4.35 K/UL

## 2018-05-31 PROCEDURE — 99214 OFFICE O/P EST MOD 30 MIN: CPT | Mod: S$GLB,,, | Performed by: NURSE PRACTITIONER

## 2018-05-31 PROCEDURE — 99999 PR PBB SHADOW E&M-EST. PATIENT-LVL III: CPT | Mod: PBBFAC,TXP,, | Performed by: NURSE PRACTITIONER

## 2018-05-31 PROCEDURE — 85610 PROTHROMBIN TIME: CPT | Mod: PO,TXP

## 2018-05-31 PROCEDURE — 36415 COLL VENOUS BLD VENIPUNCTURE: CPT | Mod: PO,TXP

## 2018-05-31 PROCEDURE — 80053 COMPREHEN METABOLIC PANEL: CPT | Mod: PO,TXP

## 2018-05-31 PROCEDURE — 3008F BODY MASS INDEX DOCD: CPT | Mod: S$GLB,,, | Performed by: NURSE PRACTITIONER

## 2018-05-31 PROCEDURE — 85025 COMPLETE CBC W/AUTO DIFF WBC: CPT | Mod: PO,TXP

## 2018-05-31 NOTE — PROGRESS NOTES
Clinic Follow Up:  [facility] Follow Up Note    Reason for Follow Up:  The primary encounter diagnosis was Decompensated hepatic cirrhosis. Diagnoses of Hepatic encephalopathy and Bilateral edema of lower extremity were also pertinent to this visit.    PCP: Mckinley Vides   No address on file    HPI:  This is a 63 y.o. male here for follow up of the above  Pt is here with his son  Pt states that since his last visit with Dr. García, he has been feeling overall well without any new complaints.   His swelling continues, but his weight is improved from previous visit.   He has been monitoring his fluid intake, however, he continues with a significant sodium intake.  Reports he is eating multiple cans of soup daily.  Son reports that he is adding salt to everything he eats, including apples.       Review of Systems   Constitutional: Negative for chills, fever, malaise/fatigue and weight loss.   Respiratory: Negative for cough.    Cardiovascular: Negative for chest pain.   Gastrointestinal:        Per HPI   Musculoskeletal: Negative for myalgias.   Skin: Positive for rash. Negative for itching.   Neurological: Negative for headaches.   Psychiatric/Behavioral: The patient is not nervous/anxious.        Medical History:  Past Medical History:   Diagnosis Date    Anticoagulant long-term use     Arthritis     Back pain     Cellulitis     Cirrhosis     Coronary artery disease     DM (diabetes mellitus)     Hearing loss     right ear    Hepatic encephalopathy     Hypertension     diagnosed today (11/25/2015) and prescribed toprol    Leg edema     Nephrolithiasis     JACK (obstructive sleep apnea)     Seizures     Splenomegaly        Surgical History:   Past Surgical History:   Procedure Laterality Date    APPENDECTOMY      Open    BACK SURGERY      CHOLECYSTECTOMY      laprascopic    COLONOSCOPY N/A 1/25/2018    Procedure: COLONOSCOPY;  Surgeon: Lashawn Myles MD;  Location: [address]  [place]);  Service: Endoscopy;  Laterality: N/A;    LUMBAR DISCECTOMY      SPLENIC ARTERY EMBOLIZATION  04/08/2016    Dr Taveras    TONSILLECTOMY         Family History:   Family History   Problem Relation Age of Onset    Heart attack Neg Hx     Heart disease Neg Hx     Hypertension Neg Hx        Social History:   Social History   Substance Use Topics    Smoking status: Never Smoker    Smokeless tobacco: Current User     Types: Chew    Alcohol use No       Allergies: Reviewed    Home Medications:  Current Outpatient Prescriptions on File Prior to Visit   Medication Sig Dispense Refill    ascorbic acid (VITAMIN C) 1000 MG tablet Take 1,000 mg by mouth once daily.      aspirin (ECOTRIN) 81 MG EC tablet Take 1 tablet (81 mg total) by mouth once daily. 90 tablet 3    atorvastatin (LIPITOR) 40 MG tablet TAKE 1 TABLET(40 MG) BY MOUTH EVERY DAY 30 tablet 0    docusate sodium (COLACE) 100 MG capsule Take 100 mg by mouth once daily.       ferrous gluconate 270 mg (27 mg iron) Tab Take 1 tablet by mouth once daily.      fish oil-omega-3 fatty acids 300-1,000 mg capsule Take 2 capsules (2 g total) by mouth once daily. (Patient taking differently: Take 2 g by mouth 2 (two) times daily. )      furosemide (LASIX) 80 MG tablet Take 1 tablet (80 mg total) by mouth 2 (two) times daily. 60 tablet 2    GENERLAC 10 gram/15 mL solution Take 60 mLs (40 g total) by mouth 3 (three) times daily. 60mg three times per day (Patient taking differently: Take 60 mLs by mouth 3 (three) times daily. ) 5400 mL 6    levETIRAcetam (KEPPRA) 500 MG Tab Take 1 tablet (500 mg total) by mouth 2 (two) times daily. 180 tablet 1    magnesium oxide (MAG-OX) 400 mg tablet Take 1 tablet (400 mg total) by mouth once daily.  0    ondansetron (ZOFRAN) 4 MG tablet Take 1 tablet (4 mg total) by mouth every 8 (eight) hours as needed for Nausea. 15 tablet 3    pantoprazole (PROTONIX) 40 MG tablet Take 1 tablet (40 mg total) by mouth 2 (two) times daily  "before meals. 60 tablet 11    rifAXIMin (XIFAXAN) 550 mg Tab Take 1 tablet (550 mg total) by mouth 2 (two) times daily. 60 tablet 5    spironolactone (ALDACTONE) 100 MG tablet Take 2 tablets (200 mg total) by mouth 2 (two) times daily. 60 tablet 2    tramadol (ULTRAM) 50 mg tablet Take 50 mg by mouth every 6 (six) hours as needed for Pain.       No current facility-administered medications on file prior to visit.        Physical Exam:  Vital Signs:  BP (!) 150/70   Pulse 80   Ht 6' 2" (1.88 m)   Wt 135 kg (297 lb 9.9 oz)   BMI 38.21 kg/m²   Body mass index is 38.21 kg/m².  Physical Exam   Constitutional: He is oriented to person, place, and time. He appears well-developed.   HENT:   Head: Normocephalic.   Eyes: No scleral icterus.   Neck: Normal range of motion.   Cardiovascular: Normal rate and regular rhythm.    Pulmonary/Chest: Effort normal and breath sounds normal.   Abdominal: Soft. Bowel sounds are normal. There is no tenderness.   Musculoskeletal: He exhibits edema (BLE).   Neurological: He is alert and oriented to person, place, and time.   Skin: Skin is warm and dry.   Psychiatric: He has a normal mood and affect.   Vitals reviewed.      Labs: Pertinent labs reviewed.  MELD-Na score: 20 at 5/31/2018  1:55 PM  MELD score: 19 at 5/31/2018  1:55 PM  Calculated from:  Serum Creatinine: 1.6 mg/dL at 5/31/2018  1:55 PM  Serum Sodium: 135 mmol/L at 5/31/2018  1:55 PM  Total Bilirubin: 2.3 mg/dL at 5/31/2018  1:55 PM  INR(ratio): 1.5 at 5/31/2018  1:55 PM  Age: 63 years        Assessment:  1. Decompensated hepatic cirrhosis    2. Hepatic encephalopathy    3. Bilateral edema of lower extremity        Recommendations:  - stable  - BLE improving some.  - Long discussion with pt and son on how sodium plays a roll in his swelling.  - Low sodium diet discussed. Pt states understanding but does not appear motivated to make that change.    Decompensated hepatic cirrhosis    Hepatic encephalopathy    Bilateral edema " of lower extremity        Patient was seen in the liver transplant department at [facility].      Return to Clinic:    As previously planned.

## 2018-06-01 ENCOUNTER — TELEPHONE (OUTPATIENT)
Dept: TRANSPLANT | Facility: CLINIC | Age: 63
End: 2018-06-01

## 2018-06-01 NOTE — TELEPHONE ENCOUNTER
Patient's phone call returned.  He reports that he now has Medicare part A & B, effective today.  He reports that he still has his previous insurance but that Medicare is now the primary.  Informed patient that the  will be informed of above and that he will be notified if this affects his listing status.  He voiced understanding.

## 2018-06-01 NOTE — TELEPHONE ENCOUNTER
Called patient to inform him that he has been placed on the liver transplant list effective today.  He voiced understanding that he is actively listed w/ a MELD  score of 20.  Informed him that his referring provider will also be informed of above.  He voiced understanding and denies any questions/ concerns at this time.

## 2018-06-09 ENCOUNTER — HOSPITAL ENCOUNTER (INPATIENT)
Facility: HOSPITAL | Age: 63
LOS: 7 days | Discharge: HOME HEALTH | DRG: 441 | End: 2018-06-16
Attending: INTERNAL MEDICINE | Admitting: INTERNAL MEDICINE
Payer: MEDICARE

## 2018-06-09 ENCOUNTER — TELEPHONE (OUTPATIENT)
Dept: TRANSPLANT | Facility: CLINIC | Age: 63
End: 2018-06-09

## 2018-06-09 ENCOUNTER — TELEPHONE (OUTPATIENT)
Dept: TRANSPLANT | Facility: HOSPITAL | Age: 63
End: 2018-06-09

## 2018-06-09 DIAGNOSIS — R60.0 BILATERAL EDEMA OF LOWER EXTREMITY: ICD-10-CM

## 2018-06-09 DIAGNOSIS — D61.818 PANCYTOPENIA: ICD-10-CM

## 2018-06-09 DIAGNOSIS — E46 HYPOALBUMINEMIA DUE TO PROTEIN-CALORIE MALNUTRITION: ICD-10-CM

## 2018-06-09 DIAGNOSIS — K74.60 DECOMPENSATED HEPATIC CIRRHOSIS: ICD-10-CM

## 2018-06-09 DIAGNOSIS — L29.8 CHOLESTATIC PRURITUS: ICD-10-CM

## 2018-06-09 DIAGNOSIS — E88.09 HYPOALBUMINEMIA DUE TO PROTEIN-CALORIE MALNUTRITION: ICD-10-CM

## 2018-06-09 DIAGNOSIS — K72.90 DECOMPENSATED HEPATIC CIRRHOSIS: ICD-10-CM

## 2018-06-09 DIAGNOSIS — D63.8 ANEMIA OF CHRONIC DISEASE: ICD-10-CM

## 2018-06-09 DIAGNOSIS — N18.30 CKD (CHRONIC KIDNEY DISEASE), STAGE III: ICD-10-CM

## 2018-06-09 DIAGNOSIS — I25.10 CORONARY ARTERY DISEASE INVOLVING NATIVE CORONARY ARTERY OF NATIVE HEART WITHOUT ANGINA PECTORIS: ICD-10-CM

## 2018-06-09 DIAGNOSIS — K76.82 HEPATIC ENCEPHALOPATHY: Primary | ICD-10-CM

## 2018-06-09 DIAGNOSIS — K70.30 ALCOHOLIC CIRRHOSIS OF LIVER WITHOUT ASCITES: ICD-10-CM

## 2018-06-09 DIAGNOSIS — N17.9 AKI (ACUTE KIDNEY INJURY): ICD-10-CM

## 2018-06-09 DIAGNOSIS — E87.20 METABOLIC ACIDOSIS: ICD-10-CM

## 2018-06-09 DIAGNOSIS — R56.9 SEIZURES: ICD-10-CM

## 2018-06-09 DIAGNOSIS — R94.01 ELECTROENCEPHALOGRAM (EEG) ABNORMALITY WITHOUT SEIZURE: ICD-10-CM

## 2018-06-09 DIAGNOSIS — K70.30 ALCOHOLIC CIRRHOSIS, UNSPECIFIED WHETHER ASCITES PRESENT: ICD-10-CM

## 2018-06-09 DIAGNOSIS — D69.6 THROMBOCYTOPENIA: ICD-10-CM

## 2018-06-09 PROBLEM — R82.71 BACTERIURIA: Status: ACTIVE | Noted: 2018-06-09

## 2018-06-09 PROBLEM — Z97.8 ENDOTRACHEALLY INTUBATED: Status: ACTIVE | Noted: 2018-06-09

## 2018-06-09 LAB
ALBUMIN FLD-MCNC: 0.5 G/DL
ALBUMIN SERPL BCP-MCNC: 1.9 G/DL
ALLENS TEST: ABNORMAL
ALP SERPL-CCNC: 86 U/L
ALT SERPL W/O P-5'-P-CCNC: 24 U/L
AMMONIA PLAS-SCNC: 70 UMOL/L
ANION GAP SERPL CALC-SCNC: 9 MMOL/L
APPEARANCE FLD: CLEAR
APTT BLDCRRT: 25.9 SEC
AST SERPL-CCNC: 32 U/L
BACTERIA #/AREA URNS AUTO: ABNORMAL /HPF
BACTERIA #/AREA URNS AUTO: ABNORMAL /HPF
BASOPHILS # BLD AUTO: 0.02 K/UL
BASOPHILS # BLD AUTO: 0.02 K/UL
BASOPHILS NFR BLD: 0.6 %
BASOPHILS NFR BLD: 0.6 %
BILIRUB SERPL-MCNC: 2.8 MG/DL
BILIRUB UR QL STRIP: NEGATIVE
BILIRUB UR QL STRIP: NEGATIVE
BODY FLD TYPE: NORMAL
BODY FLUID SOURCE, LDH: NORMAL
BUN SERPL-MCNC: 19 MG/DL
CALCIUM SERPL-MCNC: 7.9 MG/DL
CHLORIDE SERPL-SCNC: 111 MMOL/L
CLARITY UR REFRACT.AUTO: ABNORMAL
CLARITY UR REFRACT.AUTO: ABNORMAL
CO2 SERPL-SCNC: 19 MMOL/L
COLOR FLD: YELLOW
COLOR UR AUTO: ABNORMAL
COLOR UR AUTO: ABNORMAL
CREAT SERPL-MCNC: 1.6 MG/DL
DELSYS: ABNORMAL
DIFFERENTIAL METHOD: ABNORMAL
DIFFERENTIAL METHOD: ABNORMAL
EOSINOPHIL # BLD AUTO: 0.2 K/UL
EOSINOPHIL # BLD AUTO: 0.2 K/UL
EOSINOPHIL NFR BLD: 5.2 %
EOSINOPHIL NFR BLD: 5.2 %
ERYTHROCYTE [DISTWIDTH] IN BLOOD BY AUTOMATED COUNT: 17.6 %
ERYTHROCYTE [DISTWIDTH] IN BLOOD BY AUTOMATED COUNT: 17.6 %
ERYTHROCYTE [SEDIMENTATION RATE] IN BLOOD BY WESTERGREN METHOD: 15 MM/H
EST. GFR  (AFRICAN AMERICAN): 52.6 ML/MIN/1.73 M^2
EST. GFR  (NON AFRICAN AMERICAN): 45.5 ML/MIN/1.73 M^2
ESTIMATED AVG GLUCOSE: 117 MG/DL
FIO2: 45
GLUCOSE FLD-MCNC: 139 MG/DL
GLUCOSE SERPL-MCNC: 125 MG/DL
GLUCOSE UR QL STRIP: NEGATIVE
GLUCOSE UR QL STRIP: NEGATIVE
GRAM STN SPEC: NORMAL
GRAM STN SPEC: NORMAL
HBA1C MFR BLD HPLC: 5.7 %
HCO3 UR-SCNC: 21.9 MMOL/L (ref 24–28)
HCT VFR BLD AUTO: 25.5 %
HCT VFR BLD AUTO: 25.5 %
HGB BLD-MCNC: 8.8 G/DL
HGB BLD-MCNC: 8.8 G/DL
HGB UR QL STRIP: ABNORMAL
HGB UR QL STRIP: ABNORMAL
HYALINE CASTS UR QL AUTO: 30 /LPF
HYALINE CASTS UR QL AUTO: 5 /LPF
IMM GRANULOCYTES # BLD AUTO: 0.01 K/UL
IMM GRANULOCYTES # BLD AUTO: 0.01 K/UL
IMM GRANULOCYTES NFR BLD AUTO: 0.3 %
IMM GRANULOCYTES NFR BLD AUTO: 0.3 %
INR PPP: 1.8
KETONES UR QL STRIP: NEGATIVE
KETONES UR QL STRIP: NEGATIVE
LACTATE SERPL-SCNC: 3.1 MMOL/L
LDH FLD L TO P-CCNC: 72 U/L
LEUKOCYTE ESTERASE UR QL STRIP: ABNORMAL
LEUKOCYTE ESTERASE UR QL STRIP: ABNORMAL
LYMPHOCYTES # BLD AUTO: 0.4 K/UL
LYMPHOCYTES # BLD AUTO: 0.4 K/UL
LYMPHOCYTES NFR BLD: 11 %
LYMPHOCYTES NFR BLD: 11 %
LYMPHOCYTES NFR FLD MANUAL: 23 %
MAGNESIUM SERPL-MCNC: 1.9 MG/DL
MCH RBC QN AUTO: 32.8 PG
MCH RBC QN AUTO: 32.8 PG
MCHC RBC AUTO-ENTMCNC: 34.5 G/DL
MCHC RBC AUTO-ENTMCNC: 34.5 G/DL
MCV RBC AUTO: 95 FL
MCV RBC AUTO: 95 FL
MESOTHL CELL NFR FLD MANUAL: 9 %
MICROSCOPIC COMMENT: ABNORMAL
MICROSCOPIC COMMENT: ABNORMAL
MODE: ABNORMAL
MONOCYTES # BLD AUTO: 0.5 K/UL
MONOCYTES # BLD AUTO: 0.5 K/UL
MONOCYTES NFR BLD: 15.4 %
MONOCYTES NFR BLD: 15.4 %
MONOS+MACROS NFR FLD MANUAL: 46 %
NEUTROPHILS # BLD AUTO: 2.3 K/UL
NEUTROPHILS # BLD AUTO: 2.3 K/UL
NEUTROPHILS NFR BLD: 67.5 %
NEUTROPHILS NFR BLD: 67.5 %
NEUTROPHILS NFR FLD MANUAL: 22 %
NITRITE UR QL STRIP: NEGATIVE
NITRITE UR QL STRIP: NEGATIVE
NRBC BLD-RTO: 0 /100 WBC
NRBC BLD-RTO: 0 /100 WBC
PCO2 BLDA: 26.1 MMHG (ref 35–45)
PEEP: 5
PH SMN: 7.53 [PH] (ref 7.35–7.45)
PH UR STRIP: 5 [PH] (ref 5–8)
PH UR STRIP: 5 [PH] (ref 5–8)
PHOSPHATE SERPL-MCNC: 2.2 MG/DL
PLATELET # BLD AUTO: 53 K/UL
PLATELET # BLD AUTO: 53 K/UL
PMV BLD AUTO: 11.5 FL
PMV BLD AUTO: 11.5 FL
PO2 BLDA: 213 MMHG (ref 80–100)
POC BE: -1 MMOL/L
POC SATURATED O2: 100 % (ref 95–100)
POC TCO2: 23 MMOL/L (ref 23–27)
POTASSIUM SERPL-SCNC: 3.6 MMOL/L
PROCALCITONIN SERPL IA-MCNC: 0.13 NG/ML
PROT FLD-MCNC: 1 G/DL
PROT SERPL-MCNC: 4.9 G/DL
PROT UR QL STRIP: ABNORMAL
PROT UR QL STRIP: ABNORMAL
PROTHROMBIN TIME: 18 SEC
RBC # BLD AUTO: 2.68 M/UL
RBC # BLD AUTO: 2.68 M/UL
RBC #/AREA URNS AUTO: 40 /HPF (ref 0–4)
RBC #/AREA URNS AUTO: 62 /HPF (ref 0–4)
SAMPLE: ABNORMAL
SITE: ABNORMAL
SODIUM SERPL-SCNC: 139 MMOL/L
SP GR UR STRIP: 1.01 (ref 1–1.03)
SP GR UR STRIP: 1.01 (ref 1–1.03)
SPECIMEN SOURCE: NORMAL
SQUAMOUS #/AREA URNS AUTO: 1 /HPF
SQUAMOUS #/AREA URNS AUTO: 6 /HPF
URN SPEC COLLECT METH UR: ABNORMAL
URN SPEC COLLECT METH UR: ABNORMAL
UROBILINOGEN UR STRIP-ACNC: NEGATIVE EU/DL
UROBILINOGEN UR STRIP-ACNC: NEGATIVE EU/DL
VT: 500
WBC # BLD AUTO: 3.45 K/UL
WBC # BLD AUTO: 3.45 K/UL
WBC # FLD: 69 /CU MM
WBC #/AREA URNS AUTO: 37 /HPF (ref 0–5)
WBC #/AREA URNS AUTO: >100 /HPF (ref 0–5)
WBC CLUMPS UR QL AUTO: ABNORMAL

## 2018-06-09 PROCEDURE — 87086 URINE CULTURE/COLONY COUNT: CPT | Mod: NTX

## 2018-06-09 PROCEDURE — 80053 COMPREHEN METABOLIC PANEL: CPT | Mod: NTX

## 2018-06-09 PROCEDURE — 99292 CRITICAL CARE ADDL 30 MIN: CPT | Mod: NTX,,, | Performed by: INTERNAL MEDICINE

## 2018-06-09 PROCEDURE — 82140 ASSAY OF AMMONIA: CPT | Mod: NTX

## 2018-06-09 PROCEDURE — 84145 PROCALCITONIN (PCT): CPT | Mod: NTX

## 2018-06-09 PROCEDURE — 81001 URINALYSIS AUTO W/SCOPE: CPT | Mod: 91,NTX

## 2018-06-09 PROCEDURE — 80177 DRUG SCRN QUAN LEVETIRACETAM: CPT | Mod: NTX

## 2018-06-09 PROCEDURE — 27000221 HC OXYGEN, UP TO 24 HOURS: Mod: NTX

## 2018-06-09 PROCEDURE — 99900035 HC TECH TIME PER 15 MIN (STAT): Mod: NTX

## 2018-06-09 PROCEDURE — 89051 BODY FLUID CELL COUNT: CPT | Mod: NTX

## 2018-06-09 PROCEDURE — 82042 OTHER SOURCE ALBUMIN QUAN EA: CPT | Mod: NTX

## 2018-06-09 PROCEDURE — 83735 ASSAY OF MAGNESIUM: CPT | Mod: NTX

## 2018-06-09 PROCEDURE — 87205 SMEAR GRAM STAIN: CPT | Mod: NTX

## 2018-06-09 PROCEDURE — 84100 ASSAY OF PHOSPHORUS: CPT | Mod: NTX

## 2018-06-09 PROCEDURE — 83605 ASSAY OF LACTIC ACID: CPT | Mod: NTX

## 2018-06-09 PROCEDURE — 82945 GLUCOSE OTHER FLUID: CPT | Mod: NTX

## 2018-06-09 PROCEDURE — 85730 THROMBOPLASTIN TIME PARTIAL: CPT | Mod: NTX

## 2018-06-09 PROCEDURE — 81001 URINALYSIS AUTO W/SCOPE: CPT | Mod: NTX

## 2018-06-09 PROCEDURE — 99900026 HC AIRWAY MAINTENANCE (STAT): Mod: NTX

## 2018-06-09 PROCEDURE — 25000003 PHARM REV CODE 250: Mod: NTX | Performed by: INTERNAL MEDICINE

## 2018-06-09 PROCEDURE — 49082 ABD PARACENTESIS: CPT | Mod: NTX

## 2018-06-09 PROCEDURE — 51702 INSERT TEMP BLADDER CATH: CPT | Mod: NTX

## 2018-06-09 PROCEDURE — 63600175 PHARM REV CODE 636 W HCPCS: Mod: NTX | Performed by: INTERNAL MEDICINE

## 2018-06-09 PROCEDURE — 87040 BLOOD CULTURE FOR BACTERIA: CPT | Mod: 59,NTX

## 2018-06-09 PROCEDURE — 0W9G3ZZ DRAINAGE OF PERITONEAL CAVITY, PERCUTANEOUS APPROACH: ICD-10-PCS | Performed by: INTERNAL MEDICINE

## 2018-06-09 PROCEDURE — 87075 CULTR BACTERIA EXCEPT BLOOD: CPT | Mod: NTX

## 2018-06-09 PROCEDURE — C9113 INJ PANTOPRAZOLE SODIUM, VIA: HCPCS | Mod: NTX | Performed by: INTERNAL MEDICINE

## 2018-06-09 PROCEDURE — 94761 N-INVAS EAR/PLS OXIMETRY MLT: CPT | Mod: NTX

## 2018-06-09 PROCEDURE — 87070 CULTURE OTHR SPECIMN AEROBIC: CPT | Mod: NTX

## 2018-06-09 PROCEDURE — 20000000 HC ICU ROOM: Mod: NTX

## 2018-06-09 PROCEDURE — 99222 1ST HOSP IP/OBS MODERATE 55: CPT | Mod: NTX,,, | Performed by: INTERNAL MEDICINE

## 2018-06-09 PROCEDURE — 94002 VENT MGMT INPAT INIT DAY: CPT | Mod: NTX

## 2018-06-09 PROCEDURE — 83036 HEMOGLOBIN GLYCOSYLATED A1C: CPT | Mod: NTX

## 2018-06-09 PROCEDURE — 27200966 HC CLOSED SUCTION SYSTEM: Mod: NTX

## 2018-06-09 PROCEDURE — 83615 LACTATE (LD) (LDH) ENZYME: CPT | Mod: NTX

## 2018-06-09 PROCEDURE — 36600 WITHDRAWAL OF ARTERIAL BLOOD: CPT | Mod: NTX

## 2018-06-09 PROCEDURE — 85610 PROTHROMBIN TIME: CPT | Mod: NTX

## 2018-06-09 PROCEDURE — 36415 COLL VENOUS BLD VENIPUNCTURE: CPT | Mod: NTX

## 2018-06-09 PROCEDURE — 82803 BLOOD GASES ANY COMBINATION: CPT | Mod: NTX

## 2018-06-09 PROCEDURE — 85025 COMPLETE CBC W/AUTO DIFF WBC: CPT | Mod: NTX

## 2018-06-09 PROCEDURE — 99291 CRITICAL CARE FIRST HOUR: CPT | Mod: NTX,,, | Performed by: INTERNAL MEDICINE

## 2018-06-09 PROCEDURE — 84157 ASSAY OF PROTEIN OTHER: CPT | Mod: NTX

## 2018-06-09 RX ORDER — LEVETIRACETAM 5 MG/ML
500 INJECTION INTRAVASCULAR EVERY 12 HOURS
Status: DISCONTINUED | OUTPATIENT
Start: 2018-06-09 | End: 2018-06-09

## 2018-06-09 RX ORDER — GLUCAGON 1 MG
1 KIT INJECTION
Status: DISCONTINUED | OUTPATIENT
Start: 2018-06-09 | End: 2018-06-16 | Stop reason: HOSPADM

## 2018-06-09 RX ORDER — LACTULOSE 10 G/15ML
20 SOLUTION ORAL 3 TIMES DAILY
Status: DISCONTINUED | OUTPATIENT
Start: 2018-06-09 | End: 2018-06-09

## 2018-06-09 RX ORDER — IBUPROFEN 200 MG
24 TABLET ORAL
Status: DISCONTINUED | OUTPATIENT
Start: 2018-06-09 | End: 2018-06-16 | Stop reason: HOSPADM

## 2018-06-09 RX ORDER — MICONAZOLE NITRATE 2 %
POWDER (GRAM) TOPICAL 2 TIMES DAILY
Status: DISCONTINUED | OUTPATIENT
Start: 2018-06-09 | End: 2018-06-16 | Stop reason: HOSPADM

## 2018-06-09 RX ORDER — LANOLIN ALCOHOL/MO/W.PET/CERES
400 CREAM (GRAM) TOPICAL ONCE
Status: COMPLETED | OUTPATIENT
Start: 2018-06-09 | End: 2018-06-09

## 2018-06-09 RX ORDER — VANCOMYCIN HYDROCHLORIDE
1500
Status: DISCONTINUED | OUTPATIENT
Start: 2018-06-09 | End: 2018-06-09

## 2018-06-09 RX ORDER — POTASSIUM CHLORIDE 20 MEQ/15ML
20 SOLUTION ORAL ONCE
Status: COMPLETED | OUTPATIENT
Start: 2018-06-09 | End: 2018-06-09

## 2018-06-09 RX ORDER — LACTULOSE 10 G/15ML
30 SOLUTION ORAL EVERY 6 HOURS
Status: DISCONTINUED | OUTPATIENT
Start: 2018-06-09 | End: 2018-06-12

## 2018-06-09 RX ORDER — SODIUM CHLORIDE 0.9 % (FLUSH) 0.9 %
5 SYRINGE (ML) INJECTION
Status: DISCONTINUED | OUTPATIENT
Start: 2018-06-09 | End: 2018-06-16 | Stop reason: HOSPADM

## 2018-06-09 RX ORDER — IBUPROFEN 200 MG
16 TABLET ORAL
Status: DISCONTINUED | OUTPATIENT
Start: 2018-06-09 | End: 2018-06-16 | Stop reason: HOSPADM

## 2018-06-09 RX ORDER — INSULIN ASPART 100 [IU]/ML
0-5 INJECTION, SOLUTION INTRAVENOUS; SUBCUTANEOUS
Status: DISCONTINUED | OUTPATIENT
Start: 2018-06-09 | End: 2018-06-16 | Stop reason: HOSPADM

## 2018-06-09 RX ORDER — PROPOFOL 10 MG/ML
5 INJECTION, EMULSION INTRAVENOUS CONTINUOUS
Status: DISCONTINUED | OUTPATIENT
Start: 2018-06-09 | End: 2018-06-10

## 2018-06-09 RX ORDER — LEVETIRACETAM 500 MG/1
500 TABLET ORAL 2 TIMES DAILY
Status: DISCONTINUED | OUTPATIENT
Start: 2018-06-09 | End: 2018-06-10

## 2018-06-09 RX ORDER — FENTANYL CITRATE 50 UG/ML
50 INJECTION, SOLUTION INTRAMUSCULAR; INTRAVENOUS
Status: DISCONTINUED | OUTPATIENT
Start: 2018-06-09 | End: 2018-06-09

## 2018-06-09 RX ORDER — CHLORHEXIDINE GLUCONATE ORAL RINSE 1.2 MG/ML
15 SOLUTION DENTAL 2 TIMES DAILY
Status: DISCONTINUED | OUTPATIENT
Start: 2018-06-09 | End: 2018-06-12

## 2018-06-09 RX ADMIN — CHLORHEXIDINE GLUCONATE 15 ML: 1.2 RINSE ORAL at 09:06

## 2018-06-09 RX ADMIN — LEVETIRACETAM 500 MG: 500 TABLET ORAL at 08:06

## 2018-06-09 RX ADMIN — MICONAZOLE NITRATE: 20 POWDER TOPICAL at 08:06

## 2018-06-09 RX ADMIN — PROPOFOL 20 MCG/KG/MIN: 10 INJECTION, EMULSION INTRAVENOUS at 02:06

## 2018-06-09 RX ADMIN — RIFAXIMIN 400 MG: 200 TABLET ORAL at 04:06

## 2018-06-09 RX ADMIN — LACTULOSE 30 G: 20 SOLUTION ORAL at 11:06

## 2018-06-09 RX ADMIN — MICONAZOLE NITRATE: 20 POWDER TOPICAL at 09:06

## 2018-06-09 RX ADMIN — Medication 400 MG: at 06:06

## 2018-06-09 RX ADMIN — CHLORHEXIDINE GLUCONATE 15 ML: 1.2 RINSE ORAL at 08:06

## 2018-06-09 RX ADMIN — RIFAXIMIN 400 MG: 200 TABLET ORAL at 09:06

## 2018-06-09 RX ADMIN — POTASSIUM CHLORIDE 20 MEQ: 20 SOLUTION ORAL at 06:06

## 2018-06-09 RX ADMIN — DEXTROSE 40 MG: 50 INJECTION, SOLUTION INTRAVENOUS at 08:06

## 2018-06-09 RX ADMIN — LEVETIRACETAM 500 MG: 500 TABLET ORAL at 09:06

## 2018-06-09 RX ADMIN — LACTULOSE 30 G: 20 SOLUTION ORAL at 09:06

## 2018-06-09 RX ADMIN — DEXTROSE 40 MG: 50 INJECTION, SOLUTION INTRAVENOUS at 09:06

## 2018-06-09 RX ADMIN — PIPERACILLIN AND TAZOBACTAM 4.5 G: 4; .5 INJECTION, POWDER, LYOPHILIZED, FOR SOLUTION INTRAVENOUS; PARENTERAL at 06:06

## 2018-06-09 RX ADMIN — LACTULOSE 30 G: 20 SOLUTION ORAL at 06:06

## 2018-06-09 RX ADMIN — LACTULOSE 20 G: 20 SOLUTION ORAL at 02:06

## 2018-06-09 RX ADMIN — RIFAXIMIN 400 MG: 200 TABLET ORAL at 08:06

## 2018-06-09 RX ADMIN — PROPOFOL 45 MCG/KG/MIN: 10 INJECTION, EMULSION INTRAVENOUS at 01:06

## 2018-06-09 NOTE — PLAN OF CARE
Problem: Patient Care Overview  Goal: Plan of Care Review  Outcome: Ongoing (interventions implemented as appropriate)  Propofol turned off at 0800. Pt does not follow commands but he grimaces and withdraws to pain. Pt remains orally intubated, switched to PS 10/5 with good O2 sat. VS and assessment per flow sheet. Patient progressing towards goals as tolerated. Plan of care reviewed with family, all questions and concerns addressed, will continue to monitor.

## 2018-06-09 NOTE — PROGRESS NOTES
Pt was received intubated with a 7.5 ETT and 23 @ the lips. Pt was placed on mechanical ventilation with the documented settings.

## 2018-06-09 NOTE — SUBJECTIVE & OBJECTIVE
Review of Systems   Unable to perform ROS: Intubated       Past Medical History:   Diagnosis Date    Anticoagulant long-term use     Arthritis     Back pain     Cellulitis     Cirrhosis     Coronary artery disease     DM (diabetes mellitus)     Hearing loss     right ear    Hepatic encephalopathy     Hypertension     diagnosed today (11/25/2015) and prescribed toprol    Leg edema     Nephrolithiasis     JACK (obstructive sleep apnea)     Seizures     Splenomegaly        Past Surgical History:   Procedure Laterality Date    APPENDECTOMY      Open    BACK SURGERY      CHOLECYSTECTOMY      laprascopic    COLONOSCOPY N/A 1/25/2018    Procedure: COLONOSCOPY;  Surgeon: Lashawn Myles MD;  Location: [facility] ([address] [place] [address]);  Service: Endoscopy;  Laterality: N/A;    LUMBAR DISCECTOMY      SPLENIC ARTERY EMBOLIZATION  04/08/2016    Dr Taveras    TONSILLECTOMY         Family history of liver disease: No    Review of patient's allergies indicates:   Allergen Reactions    Nsaids (non-steroidal anti-inflammatory drug)      D/t to liver disease.    Tylenol [acetaminophen]      D/t liver disease.    Penicillins Other (See Comments)     Tolerated pip-tazo in 8/2016 without issue  Since childhood was told not to take it       Social History Main Topics    Smoking status: Never Smoker    Smokeless tobacco: Current User     Types: Chew    Alcohol use No    Drug use: No    Sexual activity: Not on file       Prescriptions Prior to Admission   Medication Sig Dispense Refill Last Dose    ascorbic acid (VITAMIN C) 1000 MG tablet Take 1,000 mg by mouth once daily.   Taking    aspirin (ECOTRIN) 81 MG EC tablet Take 1 tablet (81 mg total) by mouth once daily. 90 tablet 3 Taking    atorvastatin (LIPITOR) 40 MG tablet TAKE 1 TABLET(40 MG) BY MOUTH EVERY DAY 30 tablet 0 Taking    docusate sodium (COLACE) 100 MG capsule Take 100 mg by mouth once daily.    Taking    ferrous gluconate 270 mg (27 mg iron) Tab Take 1  tablet by mouth once daily.   Taking    fish oil-omega-3 fatty acids 300-1,000 mg capsule Take 2 capsules (2 g total) by mouth once daily. (Patient taking differently: Take 2 g by mouth 2 (two) times daily. )   Taking    furosemide (LASIX) 80 MG tablet Take 1 tablet (80 mg total) by mouth 2 (two) times daily. 60 tablet 2 Taking    GENERLAC 10 gram/15 mL solution Take 60 mLs (40 g total) by mouth 3 (three) times daily. 60mg three times per day (Patient taking differently: Take 60 mLs by mouth 3 (three) times daily. ) 5400 mL 6 Taking    levETIRAcetam (KEPPRA) 500 MG Tab Take 1 tablet (500 mg total) by mouth 2 (two) times daily. 180 tablet 1 Taking    magnesium oxide (MAG-OX) 400 mg tablet Take 1 tablet (400 mg total) by mouth once daily.  0 Taking    ondansetron (ZOFRAN) 4 MG tablet Take 1 tablet (4 mg total) by mouth every 8 (eight) hours as needed for Nausea. 15 tablet 3 Taking    pantoprazole (PROTONIX) 40 MG tablet Take 1 tablet (40 mg total) by mouth 2 (two) times daily before meals. 60 tablet 11 Taking    rifAXIMin (XIFAXAN) 550 mg Tab Take 1 tablet (550 mg total) by mouth 2 (two) times daily. 60 tablet 5 Taking    spironolactone (ALDACTONE) 100 MG tablet Take 2 tablets (200 mg total) by mouth 2 (two) times daily. 60 tablet 2 Taking    tramadol (ULTRAM) 50 mg tablet Take 50 mg by mouth every 6 (six) hours as needed for Pain.   Taking       Objective:     Vital Signs (Most Recent):  Temp: 96.1 °F (35.6 °C) (06/09/18 1136)  Pulse: 93 (06/09/18 1136)  Resp: 17 (06/09/18 1136)  BP: (!) 143/68 (06/09/18 1136)  SpO2: 100 % (06/09/18 1136) Vital Signs (24h Range):  Temp:  [87.6 °F (30.9 °C)-98 °F (36.7 °C)] 96.1 °F (35.6 °C)  Pulse:  [] 93  Resp:  [12-36] 17  SpO2:  [100 %] 100 %  BP: (103-156)/(50-78) 143/68     Weight: 125.2 kg (276 lb 0.3 oz) (06/09/18 0033)  Body mass index is 38.5 kg/m².    Physical Exam   Constitutional:   Intubated; off sedation ; minimal response to sternal rub ; near  obtundation   HENT:   Head: Normocephalic and atraumatic.   Eyes: Conjunctivae are normal. No scleral icterus.   Neck: Neck supple. No thyromegaly present.   Cardiovascular: Normal rate and intact distal pulses.    Pulmonary/Chest: No respiratory distress.   Mechanical ventilation ; minimal settings , spontaneous breaths   Musculoskeletal: He exhibits edema. He exhibits no tenderness.   Neurological:   Intubated ; obtunded   Skin: No rash noted.   Psychiatric:   Unable to assess   Vitals reviewed.      MELD-Na score: 21 at 6/9/2018 12:56 AM  MELD score: 21 at 6/9/2018 12:56 AM  Calculated from:  Serum Creatinine: 1.6 mg/dL at 6/9/2018 12:56 AM  Serum Sodium: 139 mmol/L (Rounded to 137) at 6/9/2018 12:56 AM  Total Bilirubin: 2.8 mg/dL at 6/9/2018 12:56 AM  INR(ratio): 1.8 at 6/9/2018 12:56 AM  Age: 63 years    Significant Labs:  CBC:   Recent Labs  Lab 06/09/18  0056   WBC 3.45*  3.45*   RBC 2.68*  2.68*   HGB 8.8*  8.8*   HCT 25.5*  25.5*   PLT 53*  53*     BMP:   Recent Labs  Lab 06/09/18  0056   *      K 3.6   *   CO2 19*   BUN 19   CREATININE 1.6*   CALCIUM 7.9*     CMP:   Recent Labs  Lab 06/09/18  0056   *   CALCIUM 7.9*   ALBUMIN 1.9*   PROT 4.9*      K 3.6   CO2 19*   *   BUN 19   CREATININE 1.6*   ALKPHOS 86   ALT 24   AST 32   BILITOT 2.8*     Coagulation:   Recent Labs  Lab 06/09/18  0056   INR 1.8*   APTT 25.9       Significant Imaging:  Labs: Reviewed

## 2018-06-09 NOTE — ASSESSMENT & PLAN NOTE
- Known CAD diagnosed by aniogram in 3/17 showing istal LAD has a 75% stenosis, normal LCFx and RCA, 75% ostial PDA, for which underwent PCI  - Recently underwent  stress echo read by Dr. Xavier which demonstrated no ischemia at peak  (89%) predicted  - Most recent TTE from 1/2018 significant for normal LVEF with biatrial enlargement, after which he underwent RHC which was normal  - Will hold asa for now and observe, anticipate resuming tomorrow

## 2018-06-09 NOTE — CARE UPDATE
-POST ROUNDS UPDATE-    Mr. Adamson is a 62-year-old man with CAD, seizure disorder on Keppra, DMII, and EtOH cirrhosis with recurrent hepatic encephalopathy, recently placed on the transplant list who presents as a transfer from [facility] where he presented with altered mental status and was diagnosed with hepatic encephalopathy.     -- Ascitic fluid studies negative for SBP  -- Cont. lactulose and rifaximin for hepatic encephalopathy  -- Intubated for airway protection. On minimal vent settings. Weaning sedation  -- Repeating UA (initial OSH study negative; ours was obtained after repeated attempts) and discontinuing antibiotics  -- Hepatology contacted, appreciate assistance. Liver transplant surgery consult ordered and paged given that he is on the transplant list    Patient seen and discussed with Dr. Betancourt

## 2018-06-09 NOTE — HPI
Mr. Adamson is a 62-year-old man with CAD, seizure disorder on Keppra, DMII, and EtOH cirrhosis with recurrent hepatic encephalopathy, recently placed on the transplant list who presents as a transfer from [facility] where he presented with altered mental status and was diagnosed with hepatic encephalopathy. The following history is limited by his intubated status. Per the limited records that accompanied him and his family at bedside, he has been feeling well in his usual state of health until 6/8 when his son found him in his house confused. His wife helps him manage his medications, particularly his lactulose and rifaximin, but she has been out of the house helping with another sick relative, so his compliance is unclear. He was not complaining of any fevers, chill, night sweats, abdominal pain, or stool changes.    At the OSH his labs were mostly stable with our previous studies. He underwent CT head which was normal. He was emergently intubated for airway protection and transferred to [facility] for hepatology evaluation.

## 2018-06-09 NOTE — H&P
[facility]  Critical Care Medicine  History & Physical    Patient Name: Alon Adamson  MRN: [id number]  Admission Date: 6/9/2018  Hospital Length of Stay: 0 days  Code Status: Full Code  Attending Physician: Madelaine Betancourt MD   Primary Care Provider: Mckinley Vides MD   Principal Problem: Hepatic encephalopathy    Subjective:     HPI:  Mr. Adamson is a 62-year-old man with CAD, seizure disorder on Keppra, DMII, and EtOH cirrhosis with recurrent hepatic encephalopathy, recently placed on the transplant list who presents as a transfer from [facility] where he presented with altered mental status and was diagnosed with hepatic encephalopathy. The following history is limited by his intubated status. Per the limited records that accompanied him and his family at bedside, he has been feeling well in his usual state of health until 6/8 when his son found him in his house confused. His wife helps him manage his medications, particularly his lactulose and rifaximin, but she has been out of the house helping with another sick relative, so his compliance is unclear. He was not complaining of any fevers, chill, night sweats, abdominal pain, or stool changes.    At the OSH his labs were mostly stable with our previous studies. He underwent CT head which was normal. He was emergently intubated for airway protection and transferred to [facility] for hepatology evaluation.    Hospital/ICU Course:  6/9/2018: Admitted to critical care medicine with Dr. Betancourt     Past Medical History:   Diagnosis Date    Anticoagulant long-term use     Arthritis     Back pain     Cellulitis     Cirrhosis     Coronary artery disease     DM (diabetes mellitus)     Hearing loss     right ear    Hepatic encephalopathy     Hypertension     diagnosed today (11/25/2015) and prescribed toprol    Leg edema     Nephrolithiasis     JACK (obstructive sleep apnea)     Seizures      Splenomegaly        Past Surgical History:   Procedure Laterality Date    APPENDECTOMY      Open    BACK SURGERY      CHOLECYSTECTOMY      laprascopic    COLONOSCOPY N/A 1/25/2018    Procedure: COLONOSCOPY;  Surgeon: Lashawn Myles MD;  Location: [facility] ([address] [place] [address]);  Service: Endoscopy;  Laterality: N/A;    LUMBAR DISCECTOMY      SPLENIC ARTERY EMBOLIZATION  04/08/2016    Dr Taveras    TONSILLECTOMY         Review of patient's allergies indicates:   Allergen Reactions    Nsaids (non-steroidal anti-inflammatory drug)      D/t to liver disease.    Tylenol [acetaminophen]      D/t liver disease.    Penicillins Other (See Comments)     Tolerated pip-tazo in 8/2016 without issue  Since childhood was told not to take it       Family History     None        Social History Main Topics    Smoking status: Never Smoker    Smokeless tobacco: Current User     Types: Chew    Alcohol use No    Drug use: No    Sexual activity: Not on file      Review of Systems   Unable to perform ROS: Intubated     Objective:     Vital Signs (Most Recent):  Temp: (!) 88.7 °F (31.5 °C) (06/09/18 0333)  Pulse: (!) 52 (06/09/18 0333)  Resp: 14 (06/09/18 0333)  BP: 135/70 (06/09/18 0333)  SpO2: 100 % (06/09/18 0333) Vital Signs (24h Range):  Temp:  [88.7 °F (31.5 °C)-98 °F (36.7 °C)] 88.7 °F (31.5 °C)  Pulse:  [] 52  Resp:  [14-29] 14  SpO2:  [100 %] 100 %  BP: (108-135)/(57-70) 135/70   Weight: 125.2 kg (276 lb 0.3 oz)  Body mass index is 38.5 kg/m².    No intake or output data in the 24 hours ending 06/09/18 0418    Physical Exam   Constitutional: He appears well-developed and well-nourished. No distress.   HENT:   Head: Normocephalic and atraumatic.   ETT in place   Eyes: Pupils are equal, round, and reactive to light.   Neck: No JVD present.   Cardiovascular: Normal rate and regular rhythm.  Exam reveals no gallop and no friction rub.    No murmur heard.  Pulmonary/Chest: Effort normal and breath sounds normal. No  "respiratory distress. He has no wheezes.   Abdominal: Soft. Bowel sounds are normal. He exhibits distension (mild). He exhibits no mass. There is no tenderness.   +abdominal striae  +petechial rash   Musculoskeletal: He exhibits edema (2+ LLE bilaterally). He exhibits no tenderness.   Neurological: He displays normal reflexes.   Sedated, no response to verbal or physical stimulation   Skin: Capillary refill takes less than 2 seconds. No rash noted. There is erythema.   Bilateral chronic venous stasis changes       Vents:  Vent Mode: A/C (06/09/18 0333)  Ventilator Initiated: Yes (06/09/18 0041)  Set Rate: 12 bmp (06/09/18 0333)  Vt Set: 450 mL (06/09/18 0333)  Pressure Support: 0 cmH20 (06/09/18 0333)  PEEP/CPAP: 5 cmH20 (06/09/18 0333)  Oxygen Concentration (%): 30 (06/09/18 0333)  Peak Airway Pressure: 21 cmH2O (06/09/18 0333)  Plateau Pressure: 0 cmH20 (06/09/18 0333)  Total Ve: 8.41 mL (06/09/18 0333)  F/VT Ratio<105 (RSBI): (!) 27.56 (06/09/18 0333)  Lines/Drains/Airways     Drain                 NG/OG Tube 06/08/18 Center mouth 1 day         Urethral Catheter 06/08/18 Non-latex 1 day          Airway                 Airway - Non-Surgical 06/08/18 Endotracheal Tube 1 day          Peripheral Intravenous Line                 Peripheral IV - Single Lumen 06/08/18 Left Upper Arm 1 day         Peripheral IV - Single Lumen 06/08/18 Right Hand 1 day              Significant Labs:    CBC/Anemia Profile:    Recent Labs  Lab 06/09/18  0056   WBC 3.45*  3.45*   HGB 8.8*  8.8*   HCT 25.5*  25.5*   PLT 53*  53*   MCV 95  95   RDW 17.6*  17.6*        Chemistries:    Recent Labs  Lab 06/09/18  0056      K 3.6   *   CO2 19*   BUN 19   CREATININE 1.6*   CALCIUM 7.9*   ALBUMIN 1.9*   PROT 4.9*   BILITOT 2.8*   ALKPHOS 86   ALT 24   AST 32   MG 1.9   PHOS 2.2*       Significant Imaging:     CXR  "Endotracheal tube lies 4 cm from the salvador.  Monitoring leads and support tubes overlie the chest.    Heart and lungs " "appear unchanged when allowing for differences in technique and positioning.    Bones are unchanged.  Included abdominal structures are unremarkable aside from cholecystectomy clips in the gallbladder fossa." - per interpreting radiologist    Assessment/Plan:     Neuro   Seizures    - Unclear history  - Continue home keppra  - Keppra level ordered for his morning labs        Cardiac/Vascular   Coronary artery disease involving native coronary artery of native heart without angina pectoris    - Known CAD diagnosed by aniogram in 3/17 showing istal LAD has a 75% stenosis, normal LCFx and RCA, 75% ostial PDA, for which underwent PCI  - Recently underwent  stress echo read by Dr. Xavier which demonstrated no ischemia at peak  (89%) predicted  - Most recent TTE from 1/2018 significant for normal LVEF with biatrial enlargement, after which he underwent RHC which was normal  - Will hold asa for now and observe, anticipate resuming tomorrow        Renal/   Bacteriuria    - Symptoms unclear given mental status  - Griffin exchanged  - Negative PCT re-assuring  - Already on broad-spectrum antibiotics. Blood, urine (via new griffin), and ascitic cultures in process  - Anticipate weaning vancomycin quickly. Will likely complete a course of gram-negative focused antibiotics for UTI given altered mental status        GI   * Hepatic encephalopathy    - Suspected etiology non-compliance with home lactulose and rifaximin  - Exam limited by sedation. Weaning. Ammonia down to 70 (was 130s at OSH)  - Diagnostic paracentesis performed to rule-out SBP. Cell count in process. Already received ABx at OSH. PCT only 0.13, so anticipate weaning antibiotics quickly  - Patient has OG tube in place already, using it to administer lactulose and rifaximin  - Intubated for airway protection. Arrived on propofol and midazolam infusions. Weaning off propofol throughout the night in favor of observation and intermittent fentanyl PRN  - Treating " with lactulose and rifaximin        Alcoholic cirrhosis    - See decompensated hepatic cirrhosis        Decompensated hepatic cirrhosis    MELD-Na score: 21 at 6/9/2018 12:56 AM  MELD score: 21 at 6/9/2018 12:56 AM  Calculated from:  Serum Creatinine: 1.6 mg/dL at 6/9/2018 12:56 AM  Serum Sodium: 139 mmol/L (Rounded to 137) at 6/9/2018 12:56 AM  Total Bilirubin: 2.8 mg/dL at 6/9/2018 12:56 AM  INR(ratio): 1.8 at 6/9/2018 12:56 AM  Age: 63 years    - Recently placed on transplant list  - LFTs mildly worsened today. Transaminases normal, but albumin down from 2.1 -> 1.9 and INR up from 1.5 -> 1.8  - Lower extremity edema present, becoming decreasingly responsive to diuretics  - See hepatic encephalopathy section   - Diagnostic paracentesis performed to r/o SBP; labs pending  - Hepatology consulted, appreciate assistance        Other   Endotracheally intubated    - See hepatic encephalopathy        Hypoalbuminemia    - See decompensated hepatic cirrhosis        Bilateral edema of lower extremity    - Holding diuretics at present  - Will observe BP and likely resume in the morning            Critical Care Daily Checklist:    A: Awake: RASS Goal/Actual Goal:    Actual:     B: Spontaneous Breathing Trial Performed?     C: SAT & SBT Coordinated?  Yes                      D: Delirium: CAM-ICU     E: Early Mobility Performed? Yes   F: Feeding Goal:    Status:     Current Diet Order   Procedures    Diet NPO      AS: Analgesia/Sedation Weaning propofol; fentanyl PRN in place   T: Thromboembolic Prophylaxis SCD for now, will add pharmacologic with repeat CBC   H: HOB > 300 Yes   U: Stress Ulcer Prophylaxis (if needed) protonix IV BID   G: Glucose Control Insulin PRN   B: Bowel Function     I: Indwelling Catheter (Lines & Salamanca) Necessity indicated   D: De-escalation of Antimicrobials/Pharmacotherapies not indicated    Plan for the day/ETD Wean vent    Code Status:  Family/Goals of Care: Full Code         Critical secondary to  Patient has a condition that poses threat to life and bodily function: hepatic encephalopathy     Critical care was time spent personally by me on the following activities: development of treatment plan with patient or surrogate and bedside caregivers, discussions with consultants, evaluation of patient's response to treatment, examination of patient, ordering and performing treatments and interventions, ordering and review of laboratory studies, ordering and review of radiographic studies, pulse oximetry, re-evaluation of patient's condition. This critical care time did not overlap with that of any other provider or involve time for any procedures.     Amilcar Varner MD  Critical Care Medicine  [facility]

## 2018-06-09 NOTE — CONSULTS
[facility]  Hepatology  Consult Note    Patient Name: Alon Adamson  MRN: [id number]  Admission Date: 6/9/2018  Hospital Length of Stay: 0 days  Attending Provider: Madelaine Betancourt MD   Primary Care Physician: Mckinley Vides MD  Principal Problem:Hepatic encephalopathy    Inpatient consult to Hepatology  Consult performed by: ELISA ARCINIEGA  Consult ordered by: JORGE LUNA        Subjective:     Transplant status: Pre-transplant    HPI:  This is a 64 y/o male hx of Etoh cirrhosis (recently listed for liver transplant, follows with Dr. García) complicated by recurrent HE, seizure d/o on keppra, DMII, CAD who presented as transfer from [facility] for AMS and suspected HE.    History obtained from chart given pt on ventilator and nonresponsive.  He was apparently fairly well and in USOH until 6/8 when he was more confused. His wife usually manages his meds but had been out of town and thus compliance may have been an issue.  He was intubated at OSH prior to transfer.    He is admitted to MICU service, will contact LTS service for them to evaluate patient.        Review of Systems   Unable to perform ROS: Intubated       Past Medical History:   Diagnosis Date    Anticoagulant long-term use     Arthritis     Back pain     Cellulitis     Cirrhosis     Coronary artery disease     DM (diabetes mellitus)     Hearing loss     right ear    Hepatic encephalopathy     Hypertension     diagnosed today (11/25/2015) and prescribed toprol    Leg edema     Nephrolithiasis     JACK (obstructive sleep apnea)     Seizures     Splenomegaly        Past Surgical History:   Procedure Laterality Date    APPENDECTOMY      Open    BACK SURGERY      CHOLECYSTECTOMY      laprascopic    COLONOSCOPY N/A 1/25/2018    Procedure: COLONOSCOPY;  Surgeon: Lashawn Myles MD;  Location: [address]);  Service: Endoscopy;  Laterality: N/A;    LUMBAR DISCECTOMY      SPLENIC ARTERY  EMBOLIZATION  04/08/2016    Dr Taveras    TONSILLECTOMY         Family history of liver disease: No    Review of patient's allergies indicates:   Allergen Reactions    Nsaids (non-steroidal anti-inflammatory drug)      D/t to liver disease.    Tylenol [acetaminophen]      D/t liver disease.    Penicillins Other (See Comments)     Tolerated pip-tazo in 8/2016 without issue  Since childhood was told not to take it       Social History Main Topics    Smoking status: Never Smoker    Smokeless tobacco: Current User     Types: Chew    Alcohol use No    Drug use: No    Sexual activity: Not on file       Prescriptions Prior to Admission   Medication Sig Dispense Refill Last Dose    ascorbic acid (VITAMIN C) 1000 MG tablet Take 1,000 mg by mouth once daily.   Taking    aspirin (ECOTRIN) 81 MG EC tablet Take 1 tablet (81 mg total) by mouth once daily. 90 tablet 3 Taking    atorvastatin (LIPITOR) 40 MG tablet TAKE 1 TABLET(40 MG) BY MOUTH EVERY DAY 30 tablet 0 Taking    docusate sodium (COLACE) 100 MG capsule Take 100 mg by mouth once daily.    Taking    ferrous gluconate 270 mg (27 mg iron) Tab Take 1 tablet by mouth once daily.   Taking    fish oil-omega-3 fatty acids 300-1,000 mg capsule Take 2 capsules (2 g total) by mouth once daily. (Patient taking differently: Take 2 g by mouth 2 (two) times daily. )   Taking    furosemide (LASIX) 80 MG tablet Take 1 tablet (80 mg total) by mouth 2 (two) times daily. 60 tablet 2 Taking    GENERLAC 10 gram/15 mL solution Take 60 mLs (40 g total) by mouth 3 (three) times daily. 60mg three times per day (Patient taking differently: Take 60 mLs by mouth 3 (three) times daily. ) 5400 mL 6 Taking    levETIRAcetam (KEPPRA) 500 MG Tab Take 1 tablet (500 mg total) by mouth 2 (two) times daily. 180 tablet 1 Taking    magnesium oxide (MAG-OX) 400 mg tablet Take 1 tablet (400 mg total) by mouth once daily.  0 Taking    ondansetron (ZOFRAN) 4 MG tablet Take 1 tablet (4 mg total)  by mouth every 8 (eight) hours as needed for Nausea. 15 tablet 3 Taking    pantoprazole (PROTONIX) 40 MG tablet Take 1 tablet (40 mg total) by mouth 2 (two) times daily before meals. 60 tablet 11 Taking    rifAXIMin (XIFAXAN) 550 mg Tab Take 1 tablet (550 mg total) by mouth 2 (two) times daily. 60 tablet 5 Taking    spironolactone (ALDACTONE) 100 MG tablet Take 2 tablets (200 mg total) by mouth 2 (two) times daily. 60 tablet 2 Taking    tramadol (ULTRAM) 50 mg tablet Take 50 mg by mouth every 6 (six) hours as needed for Pain.   Taking       Objective:     Vital Signs (Most Recent):  Temp: 96.1 °F (35.6 °C) (06/09/18 1136)  Pulse: 93 (06/09/18 1136)  Resp: 17 (06/09/18 1136)  BP: (!) 143/68 (06/09/18 1136)  SpO2: 100 % (06/09/18 1136) Vital Signs (24h Range):  Temp:  [87.6 °F (30.9 °C)-98 °F (36.7 °C)] 96.1 °F (35.6 °C)  Pulse:  [] 93  Resp:  [12-36] 17  SpO2:  [100 %] 100 %  BP: (103-156)/(50-78) 143/68     Weight: 125.2 kg (276 lb 0.3 oz) (06/09/18 0033)  Body mass index is 38.5 kg/m².    Physical Exam   Constitutional:   Intubated; off sedation ; minimal response to sternal rub ; near obtundation   HENT:   Head: Normocephalic and atraumatic.   Eyes: Conjunctivae are normal. No scleral icterus.   Neck: Neck supple. No thyromegaly present.   Cardiovascular: Normal rate and intact distal pulses.    Pulmonary/Chest: No respiratory distress.   Mechanical ventilation ; minimal settings , spontaneous breaths   Musculoskeletal: He exhibits edema. He exhibits no tenderness.   Neurological:   Intubated ; obtunded   Skin: No rash noted.   Psychiatric:   Unable to assess   Vitals reviewed.      MELD-Na score: 21 at 6/9/2018 12:56 AM  MELD score: 21 at 6/9/2018 12:56 AM  Calculated from:  Serum Creatinine: 1.6 mg/dL at 6/9/2018 12:56 AM  Serum Sodium: 139 mmol/L (Rounded to 137) at 6/9/2018 12:56 AM  Total Bilirubin: 2.8 mg/dL at 6/9/2018 12:56 AM  INR(ratio): 1.8 at 6/9/2018 12:56 AM  Age: 63 years    Significant  Labs:  CBC:   Recent Labs  Lab 06/09/18  0056   WBC 3.45*  3.45*   RBC 2.68*  2.68*   HGB 8.8*  8.8*   HCT 25.5*  25.5*   PLT 53*  53*     BMP:   Recent Labs  Lab 06/09/18  0056   *      K 3.6   *   CO2 19*   BUN 19   CREATININE 1.6*   CALCIUM 7.9*     CMP:   Recent Labs  Lab 06/09/18  0056   *   CALCIUM 7.9*   ALBUMIN 1.9*   PROT 4.9*      K 3.6   CO2 19*   *   BUN 19   CREATININE 1.6*   ALKPHOS 86   ALT 24   AST 32   BILITOT 2.8*     Coagulation:   Recent Labs  Lab 06/09/18  0056   INR 1.8*   APTT 25.9       Significant Imaging:  Labs: Reviewed    Assessment/Plan:     Decompensated hepatic cirrhosis    62 y/o male listed for liver transplant, now with significant encephalopathy.  Negative SBP on admit.    Recs:    Need to rule out infectious process, blood and urine cultures  Empiric Abx until cultures negative.  Continue lactulose and rifaximin   - titrate lactulose to 3 -4 BM/ day  Give lactulose enemas today   - consider CT head and EEG if no improvement over 24 hours with lactulose enemas  CMP and INR daily  Strict I/Os; daily weights  Check PETH test    Will likely be transferred to LTS given listed status in future.            Thank you for your consult. I will follow-up with patient. Please contact us if you have any additional questions.    Manny Garcia MD  Hepatology  [facility]

## 2018-06-09 NOTE — PROCEDURES
"Alon Adamson is a 63 y.o. male patient.    Pulse: (!) 52 (18)  Resp: (!) 27 (18)  BP: 108/62 (18)  SpO2: 100 % (18)  Weight: 125.2 kg (276 lb 0.3 oz) (18)  Height: 5' 11" (180.3 cm) (18)       Paracentesis  Date/Time: 2018 3:31 AM  Location procedure was performed: [facility] CRITICAL CARE MEDICINE  Performed by: JORGE VARNER  Authorized by: JORGE VARNER   Assisting provider: ALDA CARROLL  Pre-operative diagnosis: hepatic encephalopathy  Post-operative diagnosis: hepatic encephalopathy  Consent Done: Yes  Consent: Written consent obtained.  Consent given by: spouse  Patient identity confirmed: , MRN and name  Time out: Immediately prior to procedure a "time out" was called to verify the correct patient, procedure, equipment, support staff and site/side marked as required.  Initial or subsequent exam: initial  Procedure purpose: diagnostic  Anesthesia: see MAR for details (Already sedated on vent)  Preparation: Patient was prepped and draped in the usual sterile fashion.  Needle gauge: 18  Ultrasound guidance: ultrasound assisted.  Puncture site: left lower quadrant  Fluid removed: 50(ml)  Fluid appearance: clear  Dressing: 4x4 sterile gauze  Complications: No  Estimated blood loss (mL): 0  Specimens: Yes  Patient tolerance: Patient tolerated the procedure well with no immediate complications          Jorge Varner  2018  "

## 2018-06-09 NOTE — ASSESSMENT & PLAN NOTE
- Symptoms unclear given mental status  - Griffin exchanged  - Negative PCT re-assuring  - Already on broad-spectrum antibiotics. Blood, urine (via new griffin), and ascitic cultures in process  - Anticipate weaning vancomycin quickly. Will likely complete a course of gram-negative focused antibiotics for UTI given altered mental status

## 2018-06-09 NOTE — ASSESSMENT & PLAN NOTE
MELD-Na score: 21 at 6/9/2018 12:56 AM  MELD score: 21 at 6/9/2018 12:56 AM  Calculated from:  Serum Creatinine: 1.6 mg/dL at 6/9/2018 12:56 AM  Serum Sodium: 139 mmol/L (Rounded to 137) at 6/9/2018 12:56 AM  Total Bilirubin: 2.8 mg/dL at 6/9/2018 12:56 AM  INR(ratio): 1.8 at 6/9/2018 12:56 AM  Age: 63 years    - Recently placed on transplant list  - LFTs mildly worsened today. Transaminases normal, but albumin down from 2.1 -> 1.9 and INR up from 1.5 -> 1.8  - Lower extremity edema present, becoming decreasingly responsive to diuretics  - See hepatic encephalopathy section   - Diagnostic paracentesis performed to r/o SBP; labs pending  - Hepatology consulted, appreciate assistance

## 2018-06-09 NOTE — HOSPITAL COURSE
Transferred from [facility] 6/9 for hepatology evaluation. Patient originally presented in [place] for hepatic encephalopathy and required intubation for his AMS. HE likely 2/2 non-compliance with lactulose. Diagnostic para completed 6/10 showed no SBP. EEG 6/10 showed intermittent continuous generalized epileptiform discharges so patient was loaded with keppra + BID dosing. Extubated 6/11 and doing well off ventilator.

## 2018-06-09 NOTE — ASSESSMENT & PLAN NOTE
64 y/o male listed for liver transplant, now with significant encephalopathy.  Negative SBP on admit.    Recs:    Need to rule out infectious process, blood and urine cultures  Empiric Abx until cultures negative.  Continue lactulose and rifaximin   - titrate lactulose to 3 -4 BM/ day  Give lactulose enemas today   - consider CT head and EEG if no improvement over 24 hours with lactulose enemas  CMP and INR daily  Strict I/Os; daily weights  Check PETH test    Will likely be transferred to LTS given listed status in future.

## 2018-06-09 NOTE — HPI
This is a 62 y/o male hx of Etoh cirrhosis (recently listed for liver transplant, follows with Dr. García) complicated by recurrent HE, seizure d/o on keppra, DMII, CAD who presented as transfer from [facility] for AMS and suspected HE.    History obtained from chart given pt on ventilator and nonresponsive.  He was apparently fairly well and in USOH until 6/8 when he was more confused. His wife usually manages his meds but had been out of town and thus compliance may have been an issue.  He was intubated at OSH prior to transfer.    He is admitted to MICU service, will contact LTS service for them to evaluate patient.

## 2018-06-09 NOTE — PLAN OF CARE
Problem: Patient Care Overview  Goal: Plan of Care Review  Outcome: Ongoing (interventions implemented as appropriate)  POC reviewed with pt and spouse at the bedside. [facility]. Pt intubated at previous hospital, ABG drawn, current vent settings R 12//PEEP 5/FiO2 30%. Pt on 10 mcg/kg/min propofol gtt. Pt pan cultured. Abx started this shift. Salamanca replaced from prior hospital. Will continue to monitor pt. VSS. WCTM

## 2018-06-09 NOTE — SUBJECTIVE & OBJECTIVE
Past Medical History:   Diagnosis Date    Anticoagulant long-term use     Arthritis     Back pain     Cellulitis     Cirrhosis     Coronary artery disease     DM (diabetes mellitus)     Hearing loss     right ear    Hepatic encephalopathy     Hypertension     diagnosed today (11/25/2015) and prescribed toprol    Leg edema     Nephrolithiasis     JACK (obstructive sleep apnea)     Seizures     Splenomegaly        Past Surgical History:   Procedure Laterality Date    APPENDECTOMY      Open    BACK SURGERY      CHOLECYSTECTOMY      laprascopic    COLONOSCOPY N/A 1/25/2018    Procedure: COLONOSCOPY;  Surgeon: Lashawn Myles MD;  Location: [facility] ([address] [place] [address]);  Service: Endoscopy;  Laterality: N/A;    LUMBAR DISCECTOMY      SPLENIC ARTERY EMBOLIZATION  04/08/2016    Dr Taveras    TONSILLECTOMY         Review of patient's allergies indicates:   Allergen Reactions    Nsaids (non-steroidal anti-inflammatory drug)      D/t to liver disease.    Tylenol [acetaminophen]      D/t liver disease.    Penicillins Other (See Comments)     Tolerated pip-tazo in 8/2016 without issue  Since childhood was told not to take it       Family History     None        Social History Main Topics    Smoking status: Never Smoker    Smokeless tobacco: Current User     Types: Chew    Alcohol use No    Drug use: No    Sexual activity: Not on file      Review of Systems   Unable to perform ROS: Intubated     Objective:     Vital Signs (Most Recent):  Temp: (!) 88.7 °F (31.5 °C) (06/09/18 0333)  Pulse: (!) 52 (06/09/18 0333)  Resp: 14 (06/09/18 0333)  BP: 135/70 (06/09/18 0333)  SpO2: 100 % (06/09/18 0333) Vital Signs (24h Range):  Temp:  [88.7 °F (31.5 °C)-98 °F (36.7 °C)] 88.7 °F (31.5 °C)  Pulse:  [] 52  Resp:  [14-29] 14  SpO2:  [100 %] 100 %  BP: (108-135)/(57-70) 135/70   Weight: 125.2 kg (276 lb 0.3 oz)  Body mass index is 38.5 kg/m².    No intake or output data in the 24 hours ending 06/09/18 0418    Physical  Exam   Constitutional: He appears well-developed and well-nourished. No distress.   HENT:   Head: Normocephalic and atraumatic.   ETT in place   Eyes: Pupils are equal, round, and reactive to light.   Neck: No JVD present.   Cardiovascular: Normal rate and regular rhythm.  Exam reveals no gallop and no friction rub.    No murmur heard.  Pulmonary/Chest: Effort normal and breath sounds normal. No respiratory distress. He has no wheezes.   Abdominal: Soft. Bowel sounds are normal. He exhibits distension (mild). He exhibits no mass. There is no tenderness.   +abdominal striae  +petechial rash   Musculoskeletal: He exhibits edema (2+ LLE bilaterally). He exhibits no tenderness.   Neurological: He displays normal reflexes.   Sedated, no response to verbal or physical stimulation   Skin: Capillary refill takes less than 2 seconds. No rash noted. There is erythema.   Bilateral chronic venous stasis changes       Vents:  Vent Mode: A/C (06/09/18 0333)  Ventilator Initiated: Yes (06/09/18 0041)  Set Rate: 12 bmp (06/09/18 0333)  Vt Set: 450 mL (06/09/18 0333)  Pressure Support: 0 cmH20 (06/09/18 0333)  PEEP/CPAP: 5 cmH20 (06/09/18 0333)  Oxygen Concentration (%): 30 (06/09/18 0333)  Peak Airway Pressure: 21 cmH2O (06/09/18 0333)  Plateau Pressure: 0 cmH20 (06/09/18 0333)  Total Ve: 8.41 mL (06/09/18 0333)  F/VT Ratio<105 (RSBI): (!) 27.56 (06/09/18 0333)  Lines/Drains/Airways     Drain                 NG/OG Tube 06/08/18 Center mouth 1 day         Urethral Catheter 06/08/18 Non-latex 1 day          Airway                 Airway - Non-Surgical 06/08/18 Endotracheal Tube 1 day          Peripheral Intravenous Line                 Peripheral IV - Single Lumen 06/08/18 Left Upper Arm 1 day         Peripheral IV - Single Lumen 06/08/18 Right Hand 1 day              Significant Labs:    CBC/Anemia Profile:    Recent Labs  Lab 06/09/18  0056   WBC 3.45*  3.45*   HGB 8.8*  8.8*   HCT 25.5*  25.5*   PLT 53*  53*   MCV 95  95  "  RDW 17.6*  17.6*        Chemistries:    Recent Labs  Lab 06/09/18  0056      K 3.6   *   CO2 19*   BUN 19   CREATININE 1.6*   CALCIUM 7.9*   ALBUMIN 1.9*   PROT 4.9*   BILITOT 2.8*   ALKPHOS 86   ALT 24   AST 32   MG 1.9   PHOS 2.2*       Significant Imaging:     CXR  "Endotracheal tube lies 4 cm from the salvador.  Monitoring leads and support tubes overlie the chest.    Heart and lungs appear unchanged when allowing for differences in technique and positioning.    Bones are unchanged.  Included abdominal structures are unremarkable aside from cholecystectomy clips in the gallbladder fossa." - per interpreting radiologist  "

## 2018-06-09 NOTE — ASSESSMENT & PLAN NOTE
- Suspected etiology non-compliance with home lactulose and rifaximin  - Exam limited by sedation. Weaning. Ammonia down to 70 (was 130s at OSH)  - Diagnostic paracentesis performed to rule-out SBP. Cell count in process. Already received ABx at OSH. PCT only 0.13, so anticipate weaning antibiotics quickly  - Patient has OG tube in place already, using it to administer lactulose and rifaximin  - Intubated for airway protection. Arrived on propofol and midazolam infusions. Weaning off propofol throughout the night in favor of observation and intermittent fentanyl PRN  - Treating with lactulose and rifaximin

## 2018-06-10 ENCOUNTER — TELEPHONE (OUTPATIENT)
Dept: TRANSPLANT | Facility: HOSPITAL | Age: 63
End: 2018-06-10

## 2018-06-10 LAB
ALBUMIN SERPL BCP-MCNC: 2.2 G/DL
ALP SERPL-CCNC: 98 U/L
ALT SERPL W/O P-5'-P-CCNC: 28 U/L
ANION GAP SERPL CALC-SCNC: 12 MMOL/L
ANISOCYTOSIS BLD QL SMEAR: SLIGHT
AST SERPL-CCNC: 38 U/L
BACTERIA UR CULT: NO GROWTH
BASOPHILS # BLD AUTO: 0.07 K/UL
BASOPHILS NFR BLD: 0.8 %
BILIRUB SERPL-MCNC: 3.5 MG/DL
BUN SERPL-MCNC: 23 MG/DL
CALCIUM SERPL-MCNC: 8.5 MG/DL
CHLORIDE SERPL-SCNC: 113 MMOL/L
CO2 SERPL-SCNC: 16 MMOL/L
CREAT SERPL-MCNC: 1.8 MG/DL
DIFFERENTIAL METHOD: ABNORMAL
EOSINOPHIL # BLD AUTO: 0.3 K/UL
EOSINOPHIL NFR BLD: 3.1 %
ERYTHROCYTE [DISTWIDTH] IN BLOOD BY AUTOMATED COUNT: 18 %
EST. GFR  (AFRICAN AMERICAN): 45.6 ML/MIN/1.73 M^2
EST. GFR  (NON AFRICAN AMERICAN): 39.5 ML/MIN/1.73 M^2
GLUCOSE SERPL-MCNC: 132 MG/DL
HCT VFR BLD AUTO: 30.2 %
HGB BLD-MCNC: 10.1 G/DL
HYPOCHROMIA BLD QL SMEAR: ABNORMAL
IMM GRANULOCYTES # BLD AUTO: 0.03 K/UL
IMM GRANULOCYTES NFR BLD AUTO: 0.3 %
INR PPP: 1.8
LYMPHOCYTES # BLD AUTO: 1 K/UL
LYMPHOCYTES NFR BLD: 10.9 %
MAGNESIUM SERPL-MCNC: 2.2 MG/DL
MCH RBC QN AUTO: 33.2 PG
MCHC RBC AUTO-ENTMCNC: 33.4 G/DL
MCV RBC AUTO: 99 FL
MONOCYTES # BLD AUTO: 1.6 K/UL
MONOCYTES NFR BLD: 17.8 %
NEUTROPHILS # BLD AUTO: 6.1 K/UL
NEUTROPHILS NFR BLD: 67.1 %
NRBC BLD-RTO: 0 /100 WBC
PHOSPHATE SERPL-MCNC: 3.3 MG/DL
PLATELET # BLD AUTO: 75 K/UL
PLATELET BLD QL SMEAR: ABNORMAL
PMV BLD AUTO: 11.4 FL
POTASSIUM SERPL-SCNC: 3.9 MMOL/L
PROT SERPL-MCNC: 5.5 G/DL
PROTHROMBIN TIME: 17.8 SEC
RBC # BLD AUTO: 3.04 M/UL
SODIUM SERPL-SCNC: 141 MMOL/L
WBC # BLD AUTO: 9.12 K/UL

## 2018-06-10 PROCEDURE — C9113 INJ PANTOPRAZOLE SODIUM, VIA: HCPCS | Mod: NTX | Performed by: INTERNAL MEDICINE

## 2018-06-10 PROCEDURE — 63600175 PHARM REV CODE 636 W HCPCS: Mod: NTX | Performed by: INTERNAL MEDICINE

## 2018-06-10 PROCEDURE — 25000003 PHARM REV CODE 250: Mod: NTX | Performed by: STUDENT IN AN ORGANIZED HEALTH CARE EDUCATION/TRAINING PROGRAM

## 2018-06-10 PROCEDURE — 99291 CRITICAL CARE FIRST HOUR: CPT | Mod: NTX,,, | Performed by: INTERNAL MEDICINE

## 2018-06-10 PROCEDURE — 85025 COMPLETE CBC W/AUTO DIFF WBC: CPT | Mod: NTX

## 2018-06-10 PROCEDURE — 83735 ASSAY OF MAGNESIUM: CPT | Mod: NTX

## 2018-06-10 PROCEDURE — 95951 HC EEG MONITORING/VIDEO RECORD: CPT | Mod: NTX

## 2018-06-10 PROCEDURE — 94003 VENT MGMT INPAT SUBQ DAY: CPT | Mod: NTX

## 2018-06-10 PROCEDURE — 99900035 HC TECH TIME PER 15 MIN (STAT): Mod: NTX

## 2018-06-10 PROCEDURE — 80053 COMPREHEN METABOLIC PANEL: CPT | Mod: NTX

## 2018-06-10 PROCEDURE — P9047 ALBUMIN (HUMAN), 25%, 50ML: HCPCS | Mod: JG,NTX | Performed by: INTERNAL MEDICINE

## 2018-06-10 PROCEDURE — 25000003 PHARM REV CODE 250: Mod: NTX | Performed by: INTERNAL MEDICINE

## 2018-06-10 PROCEDURE — 94761 N-INVAS EAR/PLS OXIMETRY MLT: CPT | Mod: NTX

## 2018-06-10 PROCEDURE — 20000000 HC ICU ROOM: Mod: NTX

## 2018-06-10 PROCEDURE — 95951 PR EEG MONITORING/VIDEORECORD: CPT | Mod: 26,NTX,, | Performed by: PSYCHIATRY & NEUROLOGY

## 2018-06-10 PROCEDURE — 99900026 HC AIRWAY MAINTENANCE (STAT): Mod: NTX

## 2018-06-10 PROCEDURE — 4A00X4Z MEASUREMENT OF CENTRAL NERVOUS ELECTRICAL ACTIVITY, EXTERNAL APPROACH: ICD-10-PCS | Performed by: PSYCHIATRY & NEUROLOGY

## 2018-06-10 PROCEDURE — 99231 SBSQ HOSP IP/OBS SF/LOW 25: CPT | Mod: NTX,,, | Performed by: INTERNAL MEDICINE

## 2018-06-10 PROCEDURE — 85610 PROTHROMBIN TIME: CPT | Mod: NTX

## 2018-06-10 PROCEDURE — 63600175 PHARM REV CODE 636 W HCPCS: Mod: NTX | Performed by: STUDENT IN AN ORGANIZED HEALTH CARE EDUCATION/TRAINING PROGRAM

## 2018-06-10 PROCEDURE — 84100 ASSAY OF PHOSPHORUS: CPT | Mod: NTX

## 2018-06-10 PROCEDURE — 63600175 PHARM REV CODE 636 W HCPCS: Mod: JG,NTX | Performed by: INTERNAL MEDICINE

## 2018-06-10 RX ORDER — ALBUMIN HUMAN 250 G/1000ML
25 SOLUTION INTRAVENOUS ONCE
Status: COMPLETED | OUTPATIENT
Start: 2018-06-10 | End: 2018-06-10

## 2018-06-10 RX ORDER — FUROSEMIDE 10 MG/ML
40 INJECTION INTRAMUSCULAR; INTRAVENOUS ONCE
Status: COMPLETED | OUTPATIENT
Start: 2018-06-10 | End: 2018-06-10

## 2018-06-10 RX ORDER — DEXMEDETOMIDINE HYDROCHLORIDE 4 UG/ML
0.2 INJECTION, SOLUTION INTRAVENOUS CONTINUOUS
Status: DISCONTINUED | OUTPATIENT
Start: 2018-06-10 | End: 2018-06-11

## 2018-06-10 RX ORDER — LEVETIRACETAM 15 MG/ML
1500 INJECTION INTRAVASCULAR EVERY 12 HOURS
Status: DISCONTINUED | OUTPATIENT
Start: 2018-06-10 | End: 2018-06-13

## 2018-06-10 RX ADMIN — LACTULOSE 30 G: 20 SOLUTION ORAL at 12:06

## 2018-06-10 RX ADMIN — LEVETIRACETAM 1500 MG: 15 INJECTION INTRAVENOUS at 08:06

## 2018-06-10 RX ADMIN — CHLORHEXIDINE GLUCONATE 15 ML: 1.2 RINSE ORAL at 09:06

## 2018-06-10 RX ADMIN — LEVETIRACETAM 500 MG: 500 TABLET ORAL at 09:06

## 2018-06-10 RX ADMIN — DEXTROSE 3000 MG: 5 SOLUTION INTRAVENOUS at 06:06

## 2018-06-10 RX ADMIN — RIFAXIMIN 400 MG: 200 TABLET ORAL at 08:06

## 2018-06-10 RX ADMIN — CHLORHEXIDINE GLUCONATE 15 ML: 1.2 RINSE ORAL at 08:06

## 2018-06-10 RX ADMIN — DEXTROSE 40 MG: 50 INJECTION, SOLUTION INTRAVENOUS at 09:06

## 2018-06-10 RX ADMIN — RIFAXIMIN 400 MG: 200 TABLET ORAL at 03:06

## 2018-06-10 RX ADMIN — DEXTROSE 40 MG: 50 INJECTION, SOLUTION INTRAVENOUS at 08:06

## 2018-06-10 RX ADMIN — LACTULOSE 30 G: 20 SOLUTION ORAL at 06:06

## 2018-06-10 RX ADMIN — MICONAZOLE NITRATE: 20 POWDER TOPICAL at 08:06

## 2018-06-10 RX ADMIN — DEXMEDETOMIDINE HYDROCHLORIDE 0.2 MCG/KG/HR: 4 INJECTION, SOLUTION INTRAVENOUS at 10:06

## 2018-06-10 RX ADMIN — ALBUMIN HUMAN 25 G: 0.25 SOLUTION INTRAVENOUS at 10:06

## 2018-06-10 RX ADMIN — MICONAZOLE NITRATE: 20 POWDER TOPICAL at 09:06

## 2018-06-10 RX ADMIN — RIFAXIMIN 400 MG: 200 TABLET ORAL at 11:06

## 2018-06-10 RX ADMIN — DEXMEDETOMIDINE HYDROCHLORIDE 0.2 MCG/KG/HR: 4 INJECTION, SOLUTION INTRAVENOUS at 08:06

## 2018-06-10 RX ADMIN — FUROSEMIDE 40 MG: 10 INJECTION, SOLUTION INTRAMUSCULAR; INTRAVENOUS at 02:06

## 2018-06-10 NOTE — PROGRESS NOTES
[facility]  Hepatology  Progress Note    Patient Name: Alon Adamson  MRN: [id number]  Admission Date: 6/9/2018  Hospital Length of Stay: 1 days  Attending Provider: Madelaine Betancourt MD   Primary Care Physician: Mckinley Vides MD  Principal Problem:Hepatic encephalopathy    Subjective:     Transplant status: Pre-transplant    HPI: This is a 64 y/o male hx of Etoh cirrhosis (recently listed for liver transplant, follows with Dr. García) complicated by recurrent HE, seizure d/o on keppra, DMII, CAD who presented as transfer from [facility] for AMS and suspected HE.    History obtained from chart given pt on ventilator and nonresponsive.  He was apparently fairly well and in USOH until 6/8 when he was more confused. His wife usually manages his meds but had been out of town and thus compliance may have been an issue.  He was intubated at OSH prior to transfer.    He is admitted to MICU service, will contact LTS service for them to evaluate patient.        Interval History:     More active on ventilator.  Sedation off.  Family at bedside.    Making small strides, icu team hoping to extubate if patient will awaken more    Current Facility-Administered Medications   Medication    chlorhexidine 0.12 % solution 15 mL    dexmedetomidine (PRECEDEX) 400mcg/100mL 0.9% NaCL infusion    dextrose 50% injection 12.5 g    dextrose 50% injection 25 g    glucagon (human recombinant) injection 1 mg    glucose chewable tablet 16 g    glucose chewable tablet 24 g    insulin aspart U-100 pen 0-5 Units    lactulose 20 gram/30 mL solution Soln 30 g    levETIRAcetam tablet 500 mg    miconazole NITRATE 2 % top powder    pantoprazole 40 mg in dextrose 5 % 100 mL infusion (ready to mix system)    rifAXImin tablet 400 mg    sodium chloride 0.9% flush 5 mL       Objective:     Vital Signs (Most Recent):  Temp: (P) 97.6 °F (36.4 °C) (06/10/18 0705)  Pulse: 88 (06/10/18 1000)  Resp: 10 (06/10/18 1000)  BP:  132/62 (06/10/18 1000)  SpO2: 99 % (06/10/18 1000) Vital Signs (24h Range):  Temp:  [96.1 °F (35.6 °C)-97.9 °F (36.6 °C)] (P) 97.6 °F (36.4 °C)  Pulse:  [] 88  Resp:  [10-44] 10  SpO2:  [94 %-100 %] 99 %  BP: (130-197)/(62-99) 132/62     Weight: 125.2 kg (276 lb 0.3 oz) (06/09/18 0033)  Body mass index is 38.5 kg/m².    Physical Exam   Constitutional:   Intubated; off sedation ; more responsive, moving spontaenously but not alert or following commands   HENT:   Head: Normocephalic and atraumatic.   Eyes: No scleral icterus.   Neck: Neck supple. No thyromegaly present.   Cardiovascular: Normal rate and intact distal pulses.    Pulmonary/Chest: No respiratory distress.   Mechanical ventilation ; minimal settings , spontaneous breaths   Abdominal: Soft. There is no tenderness.   Musculoskeletal: He exhibits edema. He exhibits no tenderness.   Neurological:   Intubated ; not following commands but more active   Skin: No rash noted.   Psychiatric:   Unable to assess   Vitals reviewed.      MELD-Na score: 23 at 6/10/2018  2:57 AM  MELD score: 23 at 6/10/2018  2:57 AM  Calculated from:  Serum Creatinine: 1.8 mg/dL at 6/10/2018  2:57 AM  Serum Sodium: 141 mmol/L (Rounded to 137) at 6/10/2018  2:57 AM  Total Bilirubin: 3.5 mg/dL at 6/10/2018  2:57 AM  INR(ratio): 1.8 at 6/10/2018  2:57 AM  Age: 63 years    Significant Labs:  CBC:   Recent Labs  Lab 06/10/18  0257   WBC 9.12   RBC 3.04*   HGB 10.1*   HCT 30.2*   PLT 75*     BMP:   Recent Labs  Lab 06/10/18  0257   *      K 3.9   *   CO2 16*   BUN 23   CREATININE 1.8*   CALCIUM 8.5*     CMP:   Recent Labs  Lab 06/10/18  0257   *   CALCIUM 8.5*   ALBUMIN 2.2*   PROT 5.5*      K 3.9   CO2 16*   *   BUN 23   CREATININE 1.8*   ALKPHOS 98   ALT 28   AST 38   BILITOT 3.5*     Coagulation:   Recent Labs  Lab 06/09/18  0056 06/10/18  0257   INR 1.8* 1.8*   APTT 25.9  --        Significant Imaging:  Labs: Reviewed    Assessment/Plan:      Decompensated hepatic cirrhosis    64 y/o male listed for liver transplant, now with significant encephalopathy.  Negative SBP on admit.    Recs:  Give rise in Cr, give albumin today  Check urine studies, consider nephrology consult  Follow cultures  Continue lactulose and rifaximin   - titrate lactulose to 3 -4 BM/ day  Give lactulose enemas today   - consider CT head and EEG if no improvement over 24 hours with lactulose enemas  CMP and INR daily  Strict I/Os; daily weights  Check PETH test - pending    Pt will be transferred to LTS once extubated.  Currently placed on internal hold            Thank you for your consult. I will follow-up with patient. Please contact us if you have any additional questions.    Manny Garcia MD  Hepatology  [facility]

## 2018-06-10 NOTE — SUBJECTIVE & OBJECTIVE
Interval History/Significant Events:     Mr. Adamson was stable overnight. He is off continuous sedation and beginning to withdraw from pain.    Review of Systems   Unable to perform ROS: Intubated     Objective:     Vital Signs (Most Recent):  Temp: 97.8 °F (36.6 °C) (06/10/18 0300)  Pulse: (!) 114 (06/10/18 0539)  Resp: 16 (06/10/18 0539)  BP: (!) 156/67 (06/10/18 0400)  SpO2: 95 % (06/10/18 0539) Vital Signs (24h Range):  Temp:  [93 °F (33.9 °C)-97.9 °F (36.6 °C)] 97.8 °F (36.6 °C)  Pulse:  [] 114  Resp:  [12-44] 16  SpO2:  [95 %-100 %] 95 %  BP: (116-197)/(50-99) 156/67   Weight: 125.2 kg (276 lb 0.3 oz)  Body mass index is 38.5 kg/m².      Intake/Output Summary (Last 24 hours) at 06/10/18 0628  Last data filed at 06/10/18 0400   Gross per 24 hour   Intake           340.63 ml   Output              755 ml   Net          -414.37 ml       Physical Exam   Constitutional: He appears well-developed and well-nourished. No distress.   HENT:   Head: Normocephalic and atraumatic.   ETT in place   Eyes: Pupils are equal, round, and reactive to light.   Neck: No JVD present.   Cardiovascular: Normal rate and regular rhythm.  Exam reveals no gallop and no friction rub.    No murmur heard.  Pulmonary/Chest: Effort normal and breath sounds normal. No respiratory distress. He has no wheezes.   Abdominal: Soft. Bowel sounds are normal. He exhibits distension (mild). He exhibits no mass. There is no tenderness.   +abdominal striae  +petechial rash   Musculoskeletal: He exhibits edema (2+ LLE bilaterally). He exhibits no tenderness.   Neurological: He displays normal reflexes.   Sedated, no response to verbal or physical stimulation   Skin: Capillary refill takes less than 2 seconds. No rash noted. There is erythema.   Bilateral chronic venous stasis changes       Vents:  Vent Mode: Spont (06/10/18 0539)  Ventilator Initiated: Yes (06/09/18 0041)  Set Rate: 0 bmp (06/10/18 0539)  Vt Set: 450 mL (06/10/18 0539)  Pressure  Support: 10 cmH20 (06/10/18 0539)  PEEP/CPAP: 5 cmH20 (06/10/18 0539)  Oxygen Concentration (%): 21 (06/10/18 0539)  Peak Airway Pressure: 16 cmH2O (06/10/18 0539)  Plateau Pressure: 0 cmH20 (06/10/18 0539)  Total Ve: 17.8 mL (06/10/18 0539)  F/VT Ratio<105 (RSBI): (!) 14.13 (06/10/18 0539)  Lines/Drains/Airways     Drain                 NG/OG Tube 06/08/18 Center mouth 2 days         Urethral Catheter 06/09/18 0300 Latex 14 Fr. 1 day          Airway                 Airway - Non-Surgical 06/08/18 Endotracheal Tube 2 days          Peripheral Intravenous Line                 Peripheral IV - Single Lumen 06/08/18 Left Upper Arm 2 days         Peripheral IV - Single Lumen 06/08/18 Right Hand 2 days              Significant Labs:    CBC/Anemia Profile:    Recent Labs  Lab 06/09/18 0056 06/10/18  0257   WBC 3.45*  3.45* 9.12   HGB 8.8*  8.8* 10.1*   HCT 25.5*  25.5* 30.2*   PLT 53*  53* 75*   MCV 95  95 99*   RDW 17.6*  17.6* 18.0*        Chemistries:    Recent Labs  Lab 06/09/18  0056 06/10/18  0257    141   K 3.6 3.9   * 113*   CO2 19* 16*   BUN 19 23   CREATININE 1.6* 1.8*   CALCIUM 7.9* 8.5*   ALBUMIN 1.9* 2.2*   PROT 4.9* 5.5*   BILITOT 2.8* 3.5*   ALKPHOS 86 98   ALT 24 28   AST 32 38   MG 1.9 2.2   PHOS 2.2* 3.3       Significant Imaging:  None new

## 2018-06-10 NOTE — ASSESSMENT & PLAN NOTE
- Stable  - Known CAD diagnosed by aniogram in 3/17 showing istal LAD has a 75% stenosis, normal LCFx and RCA, 75% ostial PDA, for which underwent PCI  - Recently underwent  stress echo read by Dr. Xavier which demonstrated no ischemia at peak  (89%) predicted  - Most recent TTE from 1/2018 significant for normal LVEF with biatrial enlargement, after which he underwent RHC which was normal  - Will hold asa for now and observe

## 2018-06-10 NOTE — ASSESSMENT & PLAN NOTE
64 y/o male listed for liver transplant, now with significant encephalopathy.  Negative SBP on admit.    Recs:  Give rise in Cr, give albumin today  Check urine studies, consider nephrology consult  Follow cultures  Continue lactulose and rifaximin   - titrate lactulose to 3 -4 BM/ day  Give lactulose enemas today   - consider CT head and EEG if no improvement over 24 hours with lactulose enemas  CMP and INR daily  Strict I/Os; daily weights  Check PETH test - pending    Pt will be transferred to LTS once extubated.  Currently placed on internal hold

## 2018-06-10 NOTE — ASSESSMENT & PLAN NOTE
- Symptoms unclear given mental status  - [place] exchanged  - Negative PCT re-assuring  - Off antibiotics

## 2018-06-10 NOTE — PLAN OF CARE
Problem: Patient Care Overview  Goal: Plan of Care Review  Outcome: Ongoing (interventions implemented as appropriate)  POC reviewed with pt at the bedside. No acute events overnight. Pt withdraws from pain but does not open eyes, follow commands, nor track when eyes are opened. Lactulose continued. CT scan possible for today. May try to extubated. See flowsheet for full assessment. VSS. WCTM

## 2018-06-10 NOTE — TELEPHONE ENCOUNTER
PATIENT NAME: Alon Muller [facility] #: [id number]    Lab Results   Component Value Date    CREATININE 1.6 (H) 06/09/2018     06/09/2018    BILITOT 2.8 (H) 06/09/2018    ALBUMIN 1.9 (L) 06/09/2018    INR 1.8 (H) 06/09/2018       Encephalopathy: 3 - 4  Ascites: moderate  Dialysis: no     Re certification form sent to Lee Estrada 06/09/2018 at 8:37 PM.    Recertification requestor: Dragan Alvarez

## 2018-06-10 NOTE — SUBJECTIVE & OBJECTIVE
Interval History:     More active on ventilator.  Sedation off.  Family at bedside.    Making small strides, icu team hoping to extubate if patient will awaken more    Current Facility-Administered Medications   Medication    chlorhexidine 0.12 % solution 15 mL    dexmedetomidine (PRECEDEX) 400mcg/100mL 0.9% NaCL infusion    dextrose 50% injection 12.5 g    dextrose 50% injection 25 g    glucagon (human recombinant) injection 1 mg    glucose chewable tablet 16 g    glucose chewable tablet 24 g    insulin aspart U-100 pen 0-5 Units    lactulose 20 gram/30 mL solution Soln 30 g    levETIRAcetam tablet 500 mg    miconazole NITRATE 2 % top powder    pantoprazole 40 mg in dextrose 5 % 100 mL infusion (ready to mix system)    rifAXImin tablet 400 mg    sodium chloride 0.9% flush 5 mL       Objective:     Vital Signs (Most Recent):  Temp: (P) 97.6 °F (36.4 °C) (06/10/18 0705)  Pulse: 88 (06/10/18 1000)  Resp: 10 (06/10/18 1000)  BP: 132/62 (06/10/18 1000)  SpO2: 99 % (06/10/18 1000) Vital Signs (24h Range):  Temp:  [96.1 °F (35.6 °C)-97.9 °F (36.6 °C)] (P) 97.6 °F (36.4 °C)  Pulse:  [] 88  Resp:  [10-44] 10  SpO2:  [94 %-100 %] 99 %  BP: (130-197)/(62-99) 132/62     Weight: 125.2 kg (276 lb 0.3 oz) (06/09/18 0033)  Body mass index is 38.5 kg/m².    Physical Exam   Constitutional:   Intubated; off sedation ; more responsive, moving spontaenously but not alert or following commands   HENT:   Head: Normocephalic and atraumatic.   Eyes: No scleral icterus.   Neck: Neck supple. No thyromegaly present.   Cardiovascular: Normal rate and intact distal pulses.    Pulmonary/Chest: No respiratory distress.   Mechanical ventilation ; minimal settings , spontaneous breaths   Abdominal: Soft. There is no tenderness.   Musculoskeletal: He exhibits edema. He exhibits no tenderness.   Neurological:   Intubated ; not following commands but more active   Skin: No rash noted.   Psychiatric:   Unable to assess   Vitals  reviewed.      MELD-Na score: 23 at 6/10/2018  2:57 AM  MELD score: 23 at 6/10/2018  2:57 AM  Calculated from:  Serum Creatinine: 1.8 mg/dL at 6/10/2018  2:57 AM  Serum Sodium: 141 mmol/L (Rounded to 137) at 6/10/2018  2:57 AM  Total Bilirubin: 3.5 mg/dL at 6/10/2018  2:57 AM  INR(ratio): 1.8 at 6/10/2018  2:57 AM  Age: 63 years    Significant Labs:  CBC:   Recent Labs  Lab 06/10/18  0257   WBC 9.12   RBC 3.04*   HGB 10.1*   HCT 30.2*   PLT 75*     BMP:   Recent Labs  Lab 06/10/18  0257   *      K 3.9   *   CO2 16*   BUN 23   CREATININE 1.8*   CALCIUM 8.5*     CMP:   Recent Labs  Lab 06/10/18  0257   *   CALCIUM 8.5*   ALBUMIN 2.2*   PROT 5.5*      K 3.9   CO2 16*   *   BUN 23   CREATININE 1.8*   ALKPHOS 98   ALT 28   AST 38   BILITOT 3.5*     Coagulation:   Recent Labs  Lab 06/09/18  0056 06/10/18  0257   INR 1.8* 1.8*   APTT 25.9  --        Significant Imaging:  Labs: Reviewed

## 2018-06-10 NOTE — PLAN OF CARE
Problem: Patient Care Overview  Goal: Plan of Care Review  Outcome: Ongoing (interventions implemented as appropriate)  Patient was started on Precedex gtt. Pt also on EEG, Keppra IVPB given per MD orders. Pt continues to not follow commands but moves all extremities. Pt on Lactulose q6h.   VS and assessment on flowsheet. POC reviewed with pt's wife and son. Will continue to monitor.

## 2018-06-10 NOTE — TELEPHONE ENCOUNTER
PATIENT NAME: Alon Muller [facility] #: [id number]    Lab Results   Component Value Date    CREATININE 1.8 (H) 06/10/2018     06/10/2018    BILITOT 3.5 (H) 06/10/2018    ALBUMIN 2.2 (L) 06/10/2018    INR 1.8 (H) 06/10/2018       Encephalopathy: 3 - 4  Ascites: moderate  Dialysis: no     Re certification form sent to Lee Estrada 06/10/2018 at 10:23 AM.    Recertification requestor: Dragan Alvarez

## 2018-06-10 NOTE — ASSESSMENT & PLAN NOTE
- Stable  - Suspected etiology non-compliance with home lactulose and rifaximin  - Diagnostic paracentesis ruled out SBP. Initially on vanc + zosyn, now off all antibiotics. PCT negative  - Patient has OG tube in place already, using it to administer lactulose and rifaximin  - Intubated for airway protection  - Initial OSH head CT negative. Will obtain repeat today if his mental status does not improve  - Treating with lactulose and rifaximin

## 2018-06-10 NOTE — ASSESSMENT & PLAN NOTE
MELD-Na score: 23 at 6/10/2018  2:57 AM  MELD score: 23 at 6/10/2018  2:57 AM  Calculated from:  Serum Creatinine: 1.8 mg/dL at 6/10/2018  2:57 AM  Serum Sodium: 141 mmol/L (Rounded to 137) at 6/10/2018  2:57 AM  Total Bilirubin: 3.5 mg/dL at 6/10/2018  2:57 AM  INR(ratio): 1.8 at 6/10/2018  2:57 AM  Age: 63 years    - Recently placed on transplant list  - LFTs stable  - Lower extremity edema present, becoming decreasingly responsive to diuretics  - See hepatic encephalopathy section   - Hepatology following, appreciate assistance

## 2018-06-10 NOTE — TELEPHONE ENCOUNTER
Received call from Dr. Alvarez.  Pt is currently admitted to ICU and needs to be placed on internal hold.

## 2018-06-10 NOTE — PROGRESS NOTES
[facility]  Critical Care Medicine  Progress Note    Patient Name: Alon Adamson  MRN: [id number]  Admission Date: 6/9/2018  Hospital Length of Stay: 1 days  Code Status: Full Code  Attending Provider: Madelaine Betancourt MD  Primary Care Provider: Mckinley Vides MD   Principal Problem: Hepatic encephalopathy    Subjective:     HPI:  Mr. Adamson is a 62-year-old man with CAD, seizure disorder on Keppra, DMII, and EtOH cirrhosis with recurrent hepatic encephalopathy, recently placed on the transplant list who presents as a transfer [facility] where he presented with altered mental status and was diagnosed with hepatic encephalopathy. The following history is limited by his intubated status. Per the limited records that accompanied him and his family at bedside, he has been feeling well in his usual state of health until 6/8 when his son found him in his house confused. His wife helps him manage his medications, particularly his lactulose and rifaximin, but she has been out of the house helping with another sick relative, so his compliance is unclear. He was not complaining of any fevers, chill, night sweats, abdominal pain, or stool changes.    At the OSH his labs were mostly stable with our previous studies. He underwent CT head which was normal. He was emergently intubated for airway protection and transferred to [facility] for hepatology evaluation.    Hospital/ICU Course:  6/9/2018: Admitted to critical care medicine with Dr. Betancourt    Interval History/Significant Events:     Mr. Adamson was stable overnight. He is off continuous sedation and beginning to withdraw from pain.    Review of Systems   Unable to perform ROS: Intubated     Objective:     Vital Signs (Most Recent):  Temp: 97.8 °F (36.6 °C) (06/10/18 0300)  Pulse: (!) 114 (06/10/18 0539)  Resp: 16 (06/10/18 0539)  BP: (!) 156/67 (06/10/18 0400)  SpO2: 95 % (06/10/18 0539) Vital Signs (24h  Range):  Temp:  [93 °F (33.9 °C)-97.9 °F (36.6 °C)] 97.8 °F (36.6 °C)  Pulse:  [] 114  Resp:  [12-44] 16  SpO2:  [95 %-100 %] 95 %  BP: (116-197)/(50-99) 156/67   Weight: 125.2 kg (276 lb 0.3 oz)  Body mass index is 38.5 kg/m².      Intake/Output Summary (Last 24 hours) at 06/10/18 0628  Last data filed at 06/10/18 0400   Gross per 24 hour   Intake           340.63 ml   Output              755 ml   Net          -414.37 ml       Physical Exam   Constitutional: He appears well-developed and well-nourished. No distress.   HENT:   Head: Normocephalic and atraumatic.   ETT in place   Eyes: Pupils are equal, round, and reactive to light.   Neck: No JVD present.   Cardiovascular: Normal rate and regular rhythm.  Exam reveals no gallop and no friction rub.    No murmur heard.  Pulmonary/Chest: Effort normal and breath sounds normal. No respiratory distress. He has no wheezes.   Abdominal: Soft. Bowel sounds are normal. He exhibits distension (mild). He exhibits no mass. There is no tenderness.   +abdominal striae  +petechial rash   Musculoskeletal: He exhibits edema (2+ LLE bilaterally). He exhibits no tenderness.   Neurological: He displays normal reflexes.   Sedated, no response to verbal or physical stimulation   Skin: Capillary refill takes less than 2 seconds. No rash noted. There is erythema.   Bilateral chronic venous stasis changes       Vents:  Vent Mode: Spont (06/10/18 0539)  Ventilator Initiated: Yes (06/09/18 0041)  Set Rate: 0 bmp (06/10/18 0539)  Vt Set: 450 mL (06/10/18 0539)  Pressure Support: 10 cmH20 (06/10/18 0539)  PEEP/CPAP: 5 cmH20 (06/10/18 0539)  Oxygen Concentration (%): 21 (06/10/18 0539)  Peak Airway Pressure: 16 cmH2O (06/10/18 0539)  Plateau Pressure: 0 cmH20 (06/10/18 0539)  Total Ve: 17.8 mL (06/10/18 0539)  F/VT Ratio<105 (RSBI): (!) 14.13 (06/10/18 0539)  Lines/Drains/Airways     Drain                 NG/OG Tube 06/08/18 Center mouth 2 days         Urethral Catheter 06/09/18 0300  Latex 14 Fr. 1 day          Airway                 Airway - Non-Surgical 06/08/18 Endotracheal Tube 2 days          Peripheral Intravenous Line                 Peripheral IV - Single Lumen 06/08/18 Left Upper Arm 2 days         Peripheral IV - Single Lumen 06/08/18 Right Hand 2 days              Significant Labs:    CBC/Anemia Profile:    Recent Labs  Lab 06/09/18  0056 06/10/18  0257   WBC 3.45*  3.45* 9.12   HGB 8.8*  8.8* 10.1*   HCT 25.5*  25.5* 30.2*   PLT 53*  53* 75*   MCV 95  95 99*   RDW 17.6*  17.6* 18.0*        Chemistries:    Recent Labs  Lab 06/09/18  0056 06/10/18  0257    141   K 3.6 3.9   * 113*   CO2 19* 16*   BUN 19 23   CREATININE 1.6* 1.8*   CALCIUM 7.9* 8.5*   ALBUMIN 1.9* 2.2*   PROT 4.9* 5.5*   BILITOT 2.8* 3.5*   ALKPHOS 86 98   ALT 24 28   AST 32 38   MG 1.9 2.2   PHOS 2.2* 3.3       Significant Imaging:  None new      Assessment/Plan:     Neuro   Seizures    - Unclear history  - Continue home keppra  - Keppra level pending        Cardiac/Vascular   Coronary artery disease involving native coronary artery of native heart without angina pectoris    - Stable  - Known CAD diagnosed by aniogram in 3/17 showing istal LAD has a 75% stenosis, normal LCFx and RCA, 75% ostial PDA, for which underwent PCI  - Recently underwent  stress echo read by Dr. Xavier which demonstrated no ischemia at peak  (89%) predicted  - Most recent TTE from 1/2018 significant for normal LVEF with biatrial enlargement, after which he underwent RHC which was normal  - Will hold asa for now and observe        Renal/   Bacteriuria    - Symptoms unclear given mental status  - Salamanca exchanged  - Negative PCT re-assuring  - Off antibiotics        GI   * Hepatic encephalopathy    - Stable  - Suspected etiology non-compliance with home lactulose and rifaximin  - Diagnostic paracentesis ruled out SBP. Initially on vanc + zosyn, now off all antibiotics. PCT negative  - Patient has OG tube in place already,  using it to administer lactulose and rifaximin  - Intubated for airway protection  - Initial OSH head CT negative. Will obtain repeat today if his mental status does not improve. EEG ordered  - Treating with lactulose and rifaximin        Alcoholic cirrhosis    - See decompensated hepatic cirrhosis        Decompensated hepatic cirrhosis    MELD-Na score: 23 at 6/10/2018  2:57 AM  MELD score: 23 at 6/10/2018  2:57 AM  Calculated from:  Serum Creatinine: 1.8 mg/dL at 6/10/2018  2:57 AM  Serum Sodium: 141 mmol/L (Rounded to 137) at 6/10/2018  2:57 AM  Total Bilirubin: 3.5 mg/dL at 6/10/2018  2:57 AM  INR(ratio): 1.8 at 6/10/2018  2:57 AM  Age: 63 years    - Recently placed on transplant list  - LFTs stable  - Lower extremity edema present, becoming decreasingly responsive to diuretics  - See hepatic encephalopathy section   - Hepatology following, appreciate assistance        Other   Endotracheally intubated    - See hepatic encephalopathy        Hypoalbuminemia    - See decompensated hepatic cirrhosis        Bilateral edema of lower extremity    - Administered lasix overnight           Critical Care Daily Checklist:    A: Awake: RASS Goal/Actual Goal: RASS Goal: 0-->alert and calm  Actual: Galan Agitation Sedation Scale (RASS): Moderate sedation   B: Spontaneous Breathing Trial Performed?     C: SAT & SBT Coordinated?  yes                      D: Delirium: CAM-ICU Overall CAM-ICU: Positive   E: Early Mobility Performed? Yes   F: Feeding Goal:    Status:     Current Diet Order   Procedures    Diet NPO      AS: Analgesia/Sedation Fentanyl PRN   T: Thromboembolic Prophylaxis SCDs   H: HOB > 300 Yes   U: Stress Ulcer Prophylaxis (if needed) PPI   G: Glucose Control Insulin PRN   B: Bowel Function Stool Occurrence: 1   I: Indwelling Catheter (Lines & Salamanca) Necessity indicated   D: De-escalation of Antimicrobials/Pharmacotherapies Not indicated    Plan for the day/ETD Work on extubation    Code Status:  Family/Goals of  Care: Full Code         Critical secondary to Patient has a condition that poses threat to life and bodily function: hepatic encephalopathy     Critical care was time spent personally by me on the following activities: development of treatment plan with patient or surrogate and bedside caregivers, discussions with consultants, evaluation of patient's response to treatment, examination of patient, ordering and performing treatments and interventions, ordering and review of laboratory studies, ordering and review of radiographic studies, pulse oximetry, re-evaluation of patient's condition. This critical care time did not overlap with that of any other provider or involve time for any procedures.     Amilcar Varner MD  Critical Care Medicine  [facility]

## 2018-06-10 NOTE — PROCEDURES
[facility] [place]     PRELIMINARY C-EEG REPORT:  Review: 6/10/18, 13:41 (start) - 15:49     Generalized sharply contoured delta-theta (2-5 Hz) slowing seen.  Intermittent continuous generalized epileptiform (sharp waves) periodic discharges seen.     Full report to follow.  Will continue to monitor.     Thank you for involving us in the care of this patient.    Christine Smith MD, JOSE LUIS(), FACNS, FAMASON.  Neurology-Epilepsy.  [facility].

## 2018-06-11 LAB
ALBUMIN SERPL BCP-MCNC: 1.9 G/DL
ALLENS TEST: ABNORMAL
ALP SERPL-CCNC: 71 U/L
ALT SERPL W/O P-5'-P-CCNC: 18 U/L
ANION GAP SERPL CALC-SCNC: 7 MMOL/L
AST SERPL-CCNC: 27 U/L
BILIRUB SERPL-MCNC: 2.8 MG/DL
BUN SERPL-MCNC: 27 MG/DL
CALCIUM SERPL-MCNC: 7.9 MG/DL
CHLORIDE SERPL-SCNC: 115 MMOL/L
CO2 SERPL-SCNC: 20 MMOL/L
CREAT SERPL-MCNC: 1.5 MG/DL
DELSYS: ABNORMAL
EST. GFR  (AFRICAN AMERICAN): 56.9 ML/MIN/1.73 M^2
EST. GFR  (NON AFRICAN AMERICAN): 49.2 ML/MIN/1.73 M^2
FIO2: 21
GLUCOSE SERPL-MCNC: 118 MG/DL
HCO3 UR-SCNC: 21.7 MMOL/L (ref 24–28)
INR PPP: 1.9
LEVETIRACETAM SERPL-MCNC: 19 UG/ML (ref 3–60)
MAGNESIUM SERPL-MCNC: 1.9 MG/DL
MAP: 6.5
MIN VOL: 6.5
MODE: ABNORMAL
PCO2 BLDA: 30 MMHG (ref 35–45)
PEEP: 5
PH SMN: 7.47 [PH] (ref 7.35–7.45)
PHOSPHATE SERPL-MCNC: 3.1 MG/DL
PO2 BLDA: 89 MMHG (ref 80–100)
POC BE: -2 MMOL/L
POC SATURATED O2: 98 % (ref 95–100)
POC TCO2: 23 MMOL/L (ref 23–27)
POTASSIUM SERPL-SCNC: 3.5 MMOL/L
PROT SERPL-MCNC: 4.4 G/DL
PROTHROMBIN TIME: 18.9 SEC
PS: 0
SAMPLE: ABNORMAL
SITE: ABNORMAL
SODIUM SERPL-SCNC: 142 MMOL/L
SP02: 99
SPONT RATE: 15
VOL: 420

## 2018-06-11 PROCEDURE — 94761 N-INVAS EAR/PLS OXIMETRY MLT: CPT | Mod: NTX

## 2018-06-11 PROCEDURE — 99233 SBSQ HOSP IP/OBS HIGH 50: CPT | Mod: NTX,,, | Performed by: INTERNAL MEDICINE

## 2018-06-11 PROCEDURE — 99900035 HC TECH TIME PER 15 MIN (STAT): Mod: NTX

## 2018-06-11 PROCEDURE — 27000221 HC OXYGEN, UP TO 24 HOURS: Mod: NTX

## 2018-06-11 PROCEDURE — 95951 HC EEG MONITORING/VIDEO RECORD: CPT | Mod: NTX

## 2018-06-11 PROCEDURE — 83735 ASSAY OF MAGNESIUM: CPT | Mod: NTX

## 2018-06-11 PROCEDURE — 82803 BLOOD GASES ANY COMBINATION: CPT | Mod: NTX

## 2018-06-11 PROCEDURE — 84100 ASSAY OF PHOSPHORUS: CPT | Mod: NTX

## 2018-06-11 PROCEDURE — 25000003 PHARM REV CODE 250: Mod: NTX | Performed by: STUDENT IN AN ORGANIZED HEALTH CARE EDUCATION/TRAINING PROGRAM

## 2018-06-11 PROCEDURE — 25000003 PHARM REV CODE 250: Mod: NTX | Performed by: INTERNAL MEDICINE

## 2018-06-11 PROCEDURE — 85610 PROTHROMBIN TIME: CPT | Mod: NTX

## 2018-06-11 PROCEDURE — 63600175 PHARM REV CODE 636 W HCPCS: Mod: NTX | Performed by: STUDENT IN AN ORGANIZED HEALTH CARE EDUCATION/TRAINING PROGRAM

## 2018-06-11 PROCEDURE — 80321 ALCOHOLS BIOMARKERS 1OR 2: CPT | Mod: NTX

## 2018-06-11 PROCEDURE — 63600175 PHARM REV CODE 636 W HCPCS: Mod: NTX | Performed by: INTERNAL MEDICINE

## 2018-06-11 PROCEDURE — 80053 COMPREHEN METABOLIC PANEL: CPT | Mod: NTX

## 2018-06-11 PROCEDURE — C9113 INJ PANTOPRAZOLE SODIUM, VIA: HCPCS | Mod: NTX | Performed by: INTERNAL MEDICINE

## 2018-06-11 PROCEDURE — 95951 PR EEG MONITORING/VIDEORECORD: CPT | Mod: 26,NTX,, | Performed by: PSYCHIATRY & NEUROLOGY

## 2018-06-11 PROCEDURE — 99900026 HC AIRWAY MAINTENANCE (STAT): Mod: NTX

## 2018-06-11 PROCEDURE — 94003 VENT MGMT INPAT SUBQ DAY: CPT | Mod: NTX

## 2018-06-11 PROCEDURE — 20000000 HC ICU ROOM: Mod: NTX

## 2018-06-11 PROCEDURE — 36600 WITHDRAWAL OF ARTERIAL BLOOD: CPT | Mod: NTX

## 2018-06-11 PROCEDURE — 94150 VITAL CAPACITY TEST: CPT | Mod: NTX

## 2018-06-11 RX ORDER — ENOXAPARIN SODIUM 100 MG/ML
40 INJECTION SUBCUTANEOUS EVERY 24 HOURS
Status: DISCONTINUED | OUTPATIENT
Start: 2018-06-11 | End: 2018-06-13

## 2018-06-11 RX ADMIN — MICONAZOLE NITRATE: 20 POWDER TOPICAL at 08:06

## 2018-06-11 RX ADMIN — CHLORHEXIDINE GLUCONATE 15 ML: 1.2 RINSE ORAL at 08:06

## 2018-06-11 RX ADMIN — MAGNESIUM SULFATE HEPTAHYDRATE 1 G: 500 INJECTION, SOLUTION INTRAMUSCULAR; INTRAVENOUS at 11:06

## 2018-06-11 RX ADMIN — POTASSIUM BICARBONATE 50 MEQ: 25 TABLET, EFFERVESCENT ORAL at 12:06

## 2018-06-11 RX ADMIN — DEXTROSE 40 MG: 50 INJECTION, SOLUTION INTRAVENOUS at 08:06

## 2018-06-11 RX ADMIN — LACTULOSE 30 G: 20 SOLUTION ORAL at 12:06

## 2018-06-11 RX ADMIN — LACTULOSE 30 G: 20 SOLUTION ORAL at 11:06

## 2018-06-11 RX ADMIN — LEVETIRACETAM 1500 MG: 15 INJECTION INTRAVENOUS at 08:06

## 2018-06-11 RX ADMIN — RIFAXIMIN 400 MG: 200 TABLET ORAL at 08:06

## 2018-06-11 RX ADMIN — ENOXAPARIN SODIUM 40 MG: 100 INJECTION SUBCUTANEOUS at 05:06

## 2018-06-11 RX ADMIN — LACTULOSE 30 G: 20 SOLUTION ORAL at 07:06

## 2018-06-11 NOTE — PROGRESS NOTES
[facility]  Critical Care Medicine  Progress Note    Patient Name: Alon Adamson  MRN: [id number]  Admission Date: 6/9/2018  Hospital Length of Stay: 2 days  Code Status: Full Code  Attending Provider: Seamus Talavera*  Primary Care Provider: Mckinley Vides MD   Principal Problem: Hepatic encephalopathy    Subjective:     HPI:  Mr. Adamson is a 62-year-old man with CAD, seizure disorder on Keppra, DMII, and EtOH cirrhosis with recurrent hepatic encephalopathy, recently placed on the transplant list who presents as a transfer from [facility] where he presented with altered mental status and was diagnosed with hepatic encephalopathy. The following history is limited by his intubated status. Per the limited records that accompanied him and his family at bedside, he has been feeling well in his usual state of health until 6/8 when his son found him in his house confused. His wife helps him manage his medications, particularly his lactulose and rifaximin, but she has been out of the house helping with another sick relative, so his compliance is unclear. He was not complaining of any fevers, chill, night sweats, abdominal pain, or stool changes.    At the OSH his labs were mostly stable with our previous studies. He underwent CT head which was normal. He was emergently intubated for airway protection and transferred to [facility] for hepatology evaluation.    Hospital/ICU Course:  Transferred from [facility] 6/9 for hepatology evaluation. Patient originally presented in [place] for hepatic encephalopathy and required intubation for his AMS. HE likely 2/2 non-compliance with lactulose. Diagnostic para completed 6/10 showed no SBP. EEG 6/10 showed intermittent continuous generalized epileptiform discharges so patient was loaded with keppra + BID dosing.     Interval History/Significant Events:   NAEON. Patient responds to voice and following  commands when off sedation.     Review of Systems   Unable to perform ROS: Intubated     Objective:     Vital Signs (Most Recent):  Temp: 97.9 °F (36.6 °C) (06/11/18 0701)  Pulse: (!) 58 (06/11/18 0720)  Resp: 16 (06/11/18 0720)  BP: (!) 94/55 (06/11/18 0701)  SpO2: 99 % (06/11/18 0720) Vital Signs (24h Range):  Temp:  [97.5 °F (36.4 °C)-98.2 °F (36.8 °C)] 97.9 °F (36.6 °C)  Pulse:  [] 58  Resp:  [10-65] 16  SpO2:  [94 %-100 %] 99 %  BP: ()/(48-85) 94/55   Weight: 125.2 kg (276 lb 0.3 oz)  Body mass index is 38.5 kg/m².      Intake/Output Summary (Last 24 hours) at 06/11/18 0810  Last data filed at 06/11/18 0701   Gross per 24 hour   Intake           824.29 ml   Output              890 ml   Net           -65.71 ml       Physical Exam   Constitutional: He appears well-developed and well-nourished. No distress.   HENT:   Head: Normocephalic and atraumatic.   ETT in place   Eyes: Pupils are equal, round, and reactive to light.   Neck: No JVD present.   Cardiovascular: Normal rate and regular rhythm.  Exam reveals no gallop and no friction rub.    No murmur heard.  Pulmonary/Chest: Effort normal and breath sounds normal. No respiratory distress. He has no wheezes.   Abdominal: Soft. Bowel sounds are normal. He exhibits distension (mild). He exhibits no mass. There is no tenderness.   +abdominal striae  +petechial rash   Musculoskeletal: He exhibits edema (2+ LLE bilaterally). He exhibits no tenderness.   Neurological: He displays normal reflexes.   Sedated, no response to verbal or physical stimulation   Skin: Capillary refill takes less than 2 seconds. No rash noted. There is erythema.   Bilateral chronic venous stasis changes       Vents:  Vent Mode: Spont (06/11/18 0720)  Ventilator Initiated: Yes (06/09/18 0041)  Set Rate: 0 bmp (06/11/18 0720)  Vt Set: 450 mL (06/11/18 0720)  Pressure Support: 0 cmH20 (06/11/18 0720)  PEEP/CPAP: 5 cmH20 (06/11/18 0720)  Oxygen Concentration (%): 21 (06/11/18 0720)  Peak  Airway Pressure: 7.5 cmH2O (06/11/18 0720)  Plateau Pressure: 0 cmH20 (06/11/18 0720)  Total Ve: 7.3 mL (06/11/18 0720)  F/VT Ratio<105 (RSBI): (!) 99.38 (06/11/18 0720)  Lines/Drains/Airways     Drain                 NG/OG Tube 06/08/18 Center mouth 3 days         Urethral Catheter 06/09/18 0300 Latex 14 Fr. 2 days         Rectal Tube 06/10/18 0600 rectal tube w/ balloon (indicate number of mLs) 1 day          Airway                 Airway - Non-Surgical 06/08/18 Endotracheal Tube 3 days          Peripheral Intravenous Line                 Peripheral IV - Single Lumen 06/08/18 Left Upper Arm 3 days         Peripheral IV - Single Lumen 06/08/18 Right Hand 3 days              Significant Labs:    CBC/Anemia Profile:    Recent Labs  Lab 06/10/18  0257   WBC 9.12   HGB 10.1*   HCT 30.2*   PLT 75*   MCV 99*   RDW 18.0*        Chemistries:    Recent Labs  Lab 06/10/18  0257 06/11/18  0347    142   K 3.9 3.5   * 115*   CO2 16* 20*   BUN 23 27*   CREATININE 1.8* 1.5*   CALCIUM 8.5* 7.9*   ALBUMIN 2.2* 1.9*   PROT 5.5* 4.4*   BILITOT 3.5* 2.8*   ALKPHOS 98 71   ALT 28 18   AST 38 27   MG 2.2 1.9   PHOS 3.3 3.1       Significant Imaging:  None new      Assessment/Plan:     Neuro   Seizures    - Unclear history, on keppra at home  - Keppra level pending  - EEG 6/10 showed continuous generalized epileptiform discharges, so he was loaded on keppra  - Keppra BID        Cardiac/Vascular   Coronary artery disease involving native coronary artery of native heart without angina pectoris    - Stable  - Known CAD diagnosed by aniogram in 3/17 showing istal LAD has a 75% stenosis, normal LCFx and RCA, 75% ostial PDA, for which underwent PCI  - Recently underwent  stress echo read by Dr. Xavier which demonstrated no ischemia at peak  (89%) predicted  - Most recent TTE from 1/2018 significant for normal LVEF with biatrial enlargement, after which he underwent RHC which was normal  - Will hold asa for now and observe         Renal/   Bacteriuria    - Symptoms unclear given mental status  - Salamanca exchanged  - Negative PCT re-assuring  - Off antibiotics        GI   * Hepatic encephalopathy    - Stable  - Suspected etiology non-compliance with home lactulose and rifaximin  - Diagnostic paracentesis ruled out SBP. Initially on vanc + zosyn, now off all antibiotics. PCT negative  - Patient has OG tube in place already, using it to administer lactulose and rifaximin  - Intubated for airway protection  - Initial OSH head CT negative. Will obtain repeat if his mental status does not improve  - Treating with lactulose and rifaximin        Alcoholic cirrhosis    - See decompensated hepatic cirrhosis        Decompensated hepatic cirrhosis    MELD-Na score: 21 at 6/11/2018  3:47 AM  MELD score: 21 at 6/11/2018  3:47 AM  Calculated from:  Serum Creatinine: 1.5 mg/dL at 6/11/2018  3:47 AM  Serum Sodium: 142 mmol/L (Rounded to 137) at 6/11/2018  3:47 AM  Total Bilirubin: 2.8 mg/dL at 6/11/2018  3:47 AM  INR(ratio): 1.9 at 6/11/2018  3:47 AM  Age: 63 years    - Recently placed on transplant list  - LFTs stable  - Lower extremity edema present, becoming decreasingly responsive to diuretics  - See hepatic encephalopathy section   - Hepatology following, appreciate assistance- recommend albumin, lactulose to 3-4 BM's/day, rifaximin, CTH if no improvement in mental status, and transfer to liver transplant service when extubated        Other   Endotracheally intubated    - See hepatic encephalopathy        Hypoalbuminemia    - See decompensated hepatic cirrhosis        Bilateral edema of lower extremity    - Administered lasix yesterday           Critical Care Daily Checklist:    A: Awake: RASS Goal/Actual Goal: RASS Goal: 0-->alert and calm  Actual: Galan Agitation Sedation Scale (RASS): Light sedation   B: Spontaneous Breathing Trial Performed?  Undergoing SBT   C: SAT & SBT Coordinated?  Yes                    D: Delirium: CAM-ICU Overall  CAM-ICU: Positive   E: Early Mobility Performed? Yes   F: Feeding Goal:    Status:     Current Diet Order   Procedures    Diet NPO      AS: Analgesia/Sedation precedex   T: Thromboembolic Prophylaxis SCD, lovenox   H: HOB > 300 Yes   U: Stress Ulcer Prophylaxis (if needed) PPI   G: Glucose Control LDSSI   B: Bowel Function Stool Occurrence: 1 (flexi seal in place)   I: Indwelling Catheter (Lines & Salamanca) Necessity Periph IV x2, NG/OG, rectal tube, ET tube   D: De-escalation of Antimicrobials/Pharmacotherapies No abx    Plan for the day/ETD SBT with potential extubation    Code Status:  Family/Goals of Care: Full Code         Critical secondary to Patient has a condition that poses threat to life and bodily function: hepatic encephalopathy with inability to protect airway      Critical care was time spent personally by me on the following activities: development of treatment plan with patient or surrogate and bedside caregivers, discussions with consultants, evaluation of patient's response to treatment, examination of patient, ordering and performing treatments and interventions, ordering and review of laboratory studies, ordering and review of radiographic studies, pulse oximetry, re-evaluation of patient's condition. This critical care time did not overlap with that of any other provider or involve time for any procedures.     Lazara Braxton MD  Critical Care Medicine  [facility]

## 2018-06-11 NOTE — NURSING
Precedex was turned off at this time. Pt's HR sustained at 55bpm and SBP 86 at this time. Dr Dennison was notified and came to the bedside to assess pt. Will administer Albumin as ordered and continue to monitor pt for agitation. If pt becomes agitated will restart Precedex as ordered per Dr Dennison. Will continue to monitor pt for changes in status.

## 2018-06-11 NOTE — ASSESSMENT & PLAN NOTE
- Stable  - Suspected etiology non-compliance with home lactulose and rifaximin  - Diagnostic paracentesis ruled out SBP. Initially on vanc + zosyn, now off all antibiotics. PCT negative  - Patient has OG tube in place already, using it to administer lactulose and rifaximin  - Intubated for airway protection  - Initial OSH head CT negative. Will obtain repeat if his mental status does not improve  - Treating with lactulose and rifaximin

## 2018-06-11 NOTE — PLAN OF CARE
Problem: Patient Care Overview  Goal: Plan of Care Review  Outcome: Ongoing (interventions implemented as appropriate)  No gtts. Extubated this afternoon. VS and assessment per flow sheet, patient progressing towards goals as tolerated, plan of care reviewed with Alon Adamson and family, all concerns addressed, will continue to monitor.      Problem: Fall Risk (Adult)  Intervention: Patient Rounds  Past Medical History:   Diagnosis Date    Anticoagulant long-term use     Arthritis     Back pain     Cellulitis     Cirrhosis     Coronary artery disease     DM (diabetes mellitus)     Hearing loss     right ear    Hepatic encephalopathy     Hypertension     diagnosed today (11/25/2015) and prescribed toprol    Leg edema     Nephrolithiasis     JACK (obstructive sleep apnea)     Seizures     Splenomegaly

## 2018-06-11 NOTE — PLAN OF CARE
Pt. transferred from [facility] [place] [facility] with AMS. Pt. Is a liver transplant candidate on list. Lives witht spouse. Partially independent with use of RW and WC. Has used [email or web address] in past. D/C needs to be determined.     Payor: [place] CROSS BLUE SHIELD / Plan: BCBS ALL OUT OF STATE / Product Type: PPO /      Mckinley Vides MD     No future appointments.       [id number]  [place], [place] - [address] AT [facility]  [address] [place] [address]  Phone: [phone] Fax: [phone]    [facility]  [address] [facility] [address]  Phone: [phone] Fax: [phone]    [facility]  [address] [address]  Phone: [phone] Fax: [phone]    [facility]  [address] [address]  Phone: [phone] Fax: [phone]      Extended Emergency Contact Information  Primary Emergency Contact: Vin Adamson   [facility]  Home Phone: [phone]  Relation: Spouse        06/11/18 0935   Discharge Assessment   Assessment Type Discharge Planning Assessment   Confirmed/corrected address and phone number on facesheet? Yes   Assessment information obtained from? Caregiver  (information obtained from spouse)   Expected Length of Stay (days) 5   Prior to hospitilization cognitive status: Alert/Oriented   Prior to hospitalization functional status: Assistive Equipment   Current cognitive status: Coma/Sedated/Intubated   Current Functional Status: Needs Assistance   Facility Arrived From: [facility] [place] [facility]   Lives With spouse   Able to Return to Prior Arrangements yes   Is patient able to care for self after discharge? Yes  (with assistance)   Who are your caregiver(s) and their phone number(s)? Spouse Ashley [phone]   Patient's perception of discharge disposition home health   Readmission Within The Last 30 Days no previous admission in last 30 days   Patient  currently being followed by outpatient case management? No   Patient currently receives home health services? No   Patient currently receives any other outside agency services? No   Equipment Currently Used at Home wheelchair;walker, rolling   Do you have any problems affording any of your prescribed medications? No   Is the patient taking medications as prescribed? yes   Does the patient have transportation home? Yes   Transportation Available family or friend will provide   Discharge Plan A Home;Home Health   Discharge Plan B Home;Home with family   Patient/Family In Agreement With Plan yes   Does the patient currently use HME? Yes   Does the patient receive outpatient dialysis? No   Are there any open cases? No

## 2018-06-11 NOTE — NURSING
"Pt responds to voice and follows commands. Restarted Precedex to calm pt, he is restless at this time and nods head up and down to signal yes when asked "Is the tube in your mouth bothering you?" Explained to pt that he would be assessed in a few hours to determine of tube can be removed. Pt in no noted distress. Will continue to monitor pt for changes in status.   "

## 2018-06-11 NOTE — ASSESSMENT & PLAN NOTE
64 y/o male listed for liver transplant, now with significant encephalopathy.  Negative SBP on admit. EEG with epileptiform activity.    Recs:  Appreciate epilepsy input  Consider CT / MRI of head  Follow cultures  Continue lactulose and rifaximin   - titrate lactulose to 3 -4 BM/ day  CMP and INR daily  Strict I/Os; daily weights  Check PETH test - pending    Pt will be transferred to LTS, will discuss timing  Currently placed on internal hold

## 2018-06-11 NOTE — SUBJECTIVE & OBJECTIVE
Interval History:     eeg showing epileptiform activity.  Remains intubated.  Minimal sedation, still somnolent.    Current Facility-Administered Medications   Medication    chlorhexidine 0.12 % solution 15 mL    dexmedetomidine (PRECEDEX) 400mcg/100mL 0.9% NaCL infusion    dextrose 50% injection 12.5 g    dextrose 50% injection 25 g    enoxaparin injection 40 mg    glucagon (human recombinant) injection 1 mg    glucose chewable tablet 16 g    glucose chewable tablet 24 g    insulin aspart U-100 pen 0-5 Units    lactulose 20 gram/30 mL solution Soln 30 g    levETIRAcetam in NaCl (iso-os) IVPB 1,500 mg    miconazole NITRATE 2 % top powder    pantoprazole 40 mg in dextrose 5 % 100 mL infusion (ready to mix system)    rifAXImin tablet 400 mg    sodium chloride 0.9% flush 5 mL       Objective:     Vital Signs (Most Recent):  Temp: 98 °F (36.7 °C) (06/11/18 1100)  Pulse: (!) 53 (06/11/18 1400)  Resp: 14 (06/11/18 1400)  BP: (!) 90/51 (06/11/18 1400)  SpO2: 100 % (06/11/18 1400) Vital Signs (24h Range):  Temp:  [97.5 °F (36.4 °C)-98 °F (36.7 °C)] 98 °F (36.7 °C)  Pulse:  [50-83] 53  Resp:  [12-18] 14  SpO2:  [96 %-100 %] 100 %  BP: ()/(48-70) 90/51     Weight: 125.2 kg (276 lb 0.3 oz) (06/11/18 0701)  Body mass index is 38.5 kg/m².    Physical Exam   Constitutional:   Minimal sedation , still not following commands   HENT:   Head: Normocephalic and atraumatic.   Eyes: No scleral icterus.   Neck: Neck supple. No thyromegaly present.   Cardiovascular: Normal rate and intact distal pulses.    Pulmonary/Chest: No respiratory distress.   Mechanical ventilation    Abdominal: Soft. There is no tenderness.   Musculoskeletal: He exhibits edema. He exhibits no tenderness.   Neurological:   Intubated ; not following commands but more active   Skin: No rash noted.   Psychiatric:   Unable to assess   Vitals reviewed.      MELD-Na score: 21 at 6/11/2018  3:47 AM  MELD score: 21 at 6/11/2018  3:47 AM  Calculated  from:  Serum Creatinine: 1.5 mg/dL at 6/11/2018  3:47 AM  Serum Sodium: 142 mmol/L (Rounded to 137) at 6/11/2018  3:47 AM  Total Bilirubin: 2.8 mg/dL at 6/11/2018  3:47 AM  INR(ratio): 1.9 at 6/11/2018  3:47 AM  Age: 63 years    Significant Labs:  CBC:   Recent Labs  Lab 06/10/18  0257   WBC 9.12   RBC 3.04*   HGB 10.1*   HCT 30.2*   PLT 75*     BMP:   Recent Labs  Lab 06/11/18 0347   *      K 3.5   *   CO2 20*   BUN 27*   CREATININE 1.5*   CALCIUM 7.9*     CMP:   Recent Labs  Lab 06/11/18 0347   *   CALCIUM 7.9*   ALBUMIN 1.9*   PROT 4.4*      K 3.5   CO2 20*   *   BUN 27*   CREATININE 1.5*   ALKPHOS 71   ALT 18   AST 27   BILITOT 2.8*     Coagulation:   Recent Labs  Lab 06/09/18  0056  06/11/18 0347   INR 1.8*  < > 1.9*   APTT 25.9  --   --    < > = values in this interval not displayed.    Significant Imaging:  Labs: Reviewed  Upper GI: Reviewed   eeg reviewed.

## 2018-06-11 NOTE — ASSESSMENT & PLAN NOTE
MELD-Na score: 21 at 6/11/2018  3:47 AM  MELD score: 21 at 6/11/2018  3:47 AM  Calculated from:  Serum Creatinine: 1.5 mg/dL at 6/11/2018  3:47 AM  Serum Sodium: 142 mmol/L (Rounded to 137) at 6/11/2018  3:47 AM  Total Bilirubin: 2.8 mg/dL at 6/11/2018  3:47 AM  INR(ratio): 1.9 at 6/11/2018  3:47 AM  Age: 63 years    - Recently placed on transplant list  - LFTs stable  - Lower extremity edema present, becoming decreasingly responsive to diuretics  - See hepatic encephalopathy section   - Hepatology following, appreciate assistance- recommend albumin, lactulose to 3-4 BM's/day, rifaximin, CTH if no improvement in mental status, and transfer to liver transplant service when extubated

## 2018-06-11 NOTE — PROGRESS NOTES
[facility]  Hepatology  Progress Note    Patient Name: Alon Adamson  MRN: [id number]  Admission Date: 6/9/2018  Hospital Length of Stay: 2 days  Attending Provider: Seamus Talavera*   Primary Care Physician: Mckinley Vides MD  Principal Problem:Hepatic encephalopathy    Subjective:     Transplant status: Pre-transplant    HPI: This is a 64 y/o male hx of Etoh cirrhosis (recently listed for liver transplant, follows with Dr. García) complicated by recurrent HE, seizure d/o on keppra, DMII, CAD who presented as transfer from [facility] for AMS and suspected HE.    History obtained from chart given pt on ventilator and nonresponsive.  He was apparently fairly well and in USOH until 6/8 when he was more confused. His wife usually manages his meds but had been out of town and thus compliance may have been an issue.  He was intubated at OSH prior to transfer.    He is admitted to MICU service, will contact LTS service for them to evaluate patient.        Interval History:     eeg showing epileptiform activity.  Remains intubated.  Minimal sedation, still somnolent.    Current Facility-Administered Medications   Medication    chlorhexidine 0.12 % solution 15 mL    dexmedetomidine (PRECEDEX) 400mcg/100mL 0.9% NaCL infusion    dextrose 50% injection 12.5 g    dextrose 50% injection 25 g    enoxaparin injection 40 mg    glucagon (human recombinant) injection 1 mg    glucose chewable tablet 16 g    glucose chewable tablet 24 g    insulin aspart U-100 pen 0-5 Units    lactulose 20 gram/30 mL solution Soln 30 g    levETIRAcetam in NaCl (iso-os) IVPB 1,500 mg    miconazole NITRATE 2 % top powder    pantoprazole 40 mg in dextrose 5 % 100 mL infusion (ready to mix system)    rifAXImin tablet 400 mg    sodium chloride 0.9% flush 5 mL       Objective:     Vital Signs (Most Recent):  Temp: 98 °F (36.7 °C) (06/11/18 1100)  Pulse: (!) 53 (06/11/18 1400)  Resp: 14 (06/11/18 1400)  BP: (!)  90/51 (06/11/18 1400)  SpO2: 100 % (06/11/18 1400) Vital Signs (24h Range):  Temp:  [97.5 °F (36.4 °C)-98 °F (36.7 °C)] 98 °F (36.7 °C)  Pulse:  [50-83] 53  Resp:  [12-18] 14  SpO2:  [96 %-100 %] 100 %  BP: ()/(48-70) 90/51     Weight: 125.2 kg (276 lb 0.3 oz) (06/11/18 0701)  Body mass index is 38.5 kg/m².    Physical Exam   Constitutional:   Minimal sedation , still not following commands   HENT:   Head: Normocephalic and atraumatic.   Eyes: No scleral icterus.   Neck: Neck supple. No thyromegaly present.   Cardiovascular: Normal rate and intact distal pulses.    Pulmonary/Chest: No respiratory distress.   Mechanical ventilation    Abdominal: Soft. There is no tenderness.   Musculoskeletal: He exhibits edema. He exhibits no tenderness.   Neurological:   Intubated ; not following commands but more active   Skin: No rash noted.   Psychiatric:   Unable to assess   Vitals reviewed.      MELD-Na score: 21 at 6/11/2018  3:47 AM  MELD score: 21 at 6/11/2018  3:47 AM  Calculated from:  Serum Creatinine: 1.5 mg/dL at 6/11/2018  3:47 AM  Serum Sodium: 142 mmol/L (Rounded to 137) at 6/11/2018  3:47 AM  Total Bilirubin: 2.8 mg/dL at 6/11/2018  3:47 AM  INR(ratio): 1.9 at 6/11/2018  3:47 AM  Age: 63 years    Significant Labs:  CBC:   Recent Labs  Lab 06/10/18  0257   WBC 9.12   RBC 3.04*   HGB 10.1*   HCT 30.2*   PLT 75*     BMP:   Recent Labs  Lab 06/11/18  0347   *      K 3.5   *   CO2 20*   BUN 27*   CREATININE 1.5*   CALCIUM 7.9*     CMP:   Recent Labs  Lab 06/11/18  0347   *   CALCIUM 7.9*   ALBUMIN 1.9*   PROT 4.4*      K 3.5   CO2 20*   *   BUN 27*   CREATININE 1.5*   ALKPHOS 71   ALT 18   AST 27   BILITOT 2.8*     Coagulation:   Recent Labs  Lab 06/09/18  0056  06/11/18  0347   INR 1.8*  < > 1.9*   APTT 25.9  --   --    < > = values in this interval not displayed.    Significant Imaging:  Labs: Reviewed  Upper GI: Reviewed   eeg reviewed.    Assessment/Plan:     Decompensated  hepatic cirrhosis    64 y/o male listed for liver transplant, now with significant encephalopathy.  Negative SBP on admit. EEG with epileptiform activity.    Recs:  Appreciate epilepsy input  Consider CT / MRI of head  Follow cultures  Continue lactulose and rifaximin   - titrate lactulose to 3 -4 BM/ day  CMP and INR daily  Strict I/Os; daily weights  Check PETH test - pending    Pt will be transferred to LTS, will discuss timing  Currently placed on internal hold            Thank you for your consult. I will follow-up with patient. Please contact us if you have any additional questions.    Manny Garcia MD  Hepatology  [facility]

## 2018-06-11 NOTE — PROCEDURES
DATE OF PROCEDURE:  06/10/2018    DURATION:  17 hours and 17 minutes.    ICU EEG/VIDEO MONITORING REPORT     METHODOLOGY:  Electroencephalographic (EEG) is recorded with electrodes placed   according to the International 10-20 placement system.  Thirty two (32) channels   of digital signal (sampling rate of 512/sec), including T1 and T2, were   simultaneously recorded from the scalp and may include EKG, EMG, and/or eye   monitors.  Recording band pass was 0.1 to 512 Hz.  Digital video recording of   the patient is simultaneously recorded with the EEG.  The patient is instructed   to report clinical symptoms which may occur during the recording session.  EEG   and video recording are stored and archived in digital format.  Activation   procedures, which include photic stimulation, hyperventilation and instructing   patients to perform simple tasks, are done in selected patients  The EEG is displayed on a monitor screen and can be reviewed using different   montages.  Computer-assisted analysis is employed to detect spike and   electrographic seizure activity.   The entire record is submitted for computer   analysis.  The entire recording is visually reviewed, and the times identified   by computer analysis as being spikes or seizures are reviewed again.    Compressed spectral analysis (CSA) is also performed on the activity recorded   from each individual channel.  This is displayed as a power display of   frequencies from 0 to 30 Hz over time.   The CSA is reviewed looking for   asymmetries in power between homologous areas of the scalp, then compared with   the original EEG recording.    [employer] software was also utilized in the review of this study.  This software   suite analyzes the EEG recording in multiple domains.  Coherence and rhythmicity   are computed to identify EEG sections which may contain organized seizures.    Each channel undergoes analysis to detect the presence of spike and sharp waves   which  have special and morphological characteristics of epileptic activity.  The   routine EEG recording is converted from special into frequency domain.  This is   then displayed comparing homologous areas to identify areas of significant   asymmetry.  Algorithm to identify non-cortically generated artifact is used to   separate artifact from the EEG.      RECORDING TIMES:  The study begins on 06/10/2018 at 1341 and ends on 06/11/2018   at 0700.    EEG FINDINGS:  Background of this record is medium amplitude mixed frequencies   with delta activity in the 3 to 4 Hz range seen with some superimposed theta   frequencies in the 4 to 6 Hz range as well.  There are frequent portions during   the record, which consist of sharp triphasic-appearing periodic discharges with   a bilateral distribution, which are seen, best centrally and parasagittally at   times.  They are roughly symmetric and bilaterally synchronous.  These come and   go spontaneously and portions of the record contain mixed frequency background   without sharp discharges.  There are also portions of the record, which are   dominated by EMG artifact and a mixed frequency background with some faster   activity seen during those more stimulated portions.  There is a considerable   degree of fluctuation throughout the record and cycling through the   above-mentioned patterns with no major evolution and no seizures seen.    INTERPRETATION:  Abnormal EEG due to moderate fluctuating encephalopathy with   intermittent sharp rhythmic triphasic GPEDs seen on and off for a period of up   to several minutes at a time with spontaneous resolution and without clear   seizures.  The frequency of triphasic runs does decrease towards the end of this   study as well.      DESMOND  dd: 06/11/2018 11:29:48 (CDT)  td: 06/11/2018 11:51:52 (CDT)  Doc ID   #[id number]  Job ID #[id number]    CC:

## 2018-06-11 NOTE — PLAN OF CARE
Received call from Audrey CAMPOS for [facility]. She is available to assist with any d/c planning and needs. Phone # [phone] ext. [phone].

## 2018-06-11 NOTE — ASSESSMENT & PLAN NOTE
- Unclear history, on keppra at home  - Keppra level pending  - EEG 6/10 showed continuous generalized epileptiform discharges, so he was loaded on keppra  - Keppra BID

## 2018-06-11 NOTE — SUBJECTIVE & OBJECTIVE
Interval History/Significant Events:   NAEON. Patient responds to voice and following commands when off sedation.     Review of Systems   Unable to perform ROS: Intubated     Objective:     Vital Signs (Most Recent):  Temp: 97.9 °F (36.6 °C) (06/11/18 0701)  Pulse: (!) 58 (06/11/18 0720)  Resp: 16 (06/11/18 0720)  BP: (!) 94/55 (06/11/18 0701)  SpO2: 99 % (06/11/18 0720) Vital Signs (24h Range):  Temp:  [97.5 °F (36.4 °C)-98.2 °F (36.8 °C)] 97.9 °F (36.6 °C)  Pulse:  [] 58  Resp:  [10-65] 16  SpO2:  [94 %-100 %] 99 %  BP: ()/(48-85) 94/55   Weight: 125.2 kg (276 lb 0.3 oz)  Body mass index is 38.5 kg/m².      Intake/Output Summary (Last 24 hours) at 06/11/18 0810  Last data filed at 06/11/18 0701   Gross per 24 hour   Intake           824.29 ml   Output              890 ml   Net           -65.71 ml       Physical Exam   Constitutional: He appears well-developed and well-nourished. No distress.   HENT:   Head: Normocephalic and atraumatic.   ETT in place   Eyes: Pupils are equal, round, and reactive to light.   Neck: No JVD present.   Cardiovascular: Normal rate and regular rhythm.  Exam reveals no gallop and no friction rub.    No murmur heard.  Pulmonary/Chest: Effort normal and breath sounds normal. No respiratory distress. He has no wheezes.   Abdominal: Soft. Bowel sounds are normal. He exhibits distension (mild). He exhibits no mass. There is no tenderness.   +abdominal striae  +petechial rash   Musculoskeletal: He exhibits edema (2+ LLE bilaterally). He exhibits no tenderness.   Neurological: He displays normal reflexes.   Sedated, no response to verbal or physical stimulation   Skin: Capillary refill takes less than 2 seconds. No rash noted. There is erythema.   Bilateral chronic venous stasis changes       Vents:  Vent Mode: Spont (06/11/18 0720)  Ventilator Initiated: Yes (06/09/18 0041)  Set Rate: 0 bmp (06/11/18 0720)  Vt Set: 450 mL (06/11/18 0720)  Pressure Support: 0 cmH20 (06/11/18  0720)  PEEP/CPAP: 5 cmH20 (06/11/18 0720)  Oxygen Concentration (%): 21 (06/11/18 0720)  Peak Airway Pressure: 7.5 cmH2O (06/11/18 0720)  Plateau Pressure: 0 cmH20 (06/11/18 0720)  Total Ve: 7.3 mL (06/11/18 0720)  F/VT Ratio<105 (RSBI): (!) 99.38 (06/11/18 0720)  Lines/Drains/Airways     Drain                 NG/OG Tube 06/08/18 Center mouth 3 days         Urethral Catheter 06/09/18 0300 Latex 14 Fr. 2 days         Rectal Tube 06/10/18 0600 rectal tube w/ balloon (indicate number of mLs) 1 day          Airway                 Airway - Non-Surgical 06/08/18 Endotracheal Tube 3 days          Peripheral Intravenous Line                 Peripheral IV - Single Lumen 06/08/18 Left Upper Arm 3 days         Peripheral IV - Single Lumen 06/08/18 Right Hand 3 days              Significant Labs:    CBC/Anemia Profile:    Recent Labs  Lab 06/10/18  0257   WBC 9.12   HGB 10.1*   HCT 30.2*   PLT 75*   MCV 99*   RDW 18.0*        Chemistries:    Recent Labs  Lab 06/10/18  0257 06/11/18  0347    142   K 3.9 3.5   * 115*   CO2 16* 20*   BUN 23 27*   CREATININE 1.8* 1.5*   CALCIUM 8.5* 7.9*   ALBUMIN 2.2* 1.9*   PROT 5.5* 4.4*   BILITOT 3.5* 2.8*   ALKPHOS 98 71   ALT 28 18   AST 38 27   MG 2.2 1.9   PHOS 3.3 3.1       Significant Imaging:  None new

## 2018-06-12 ENCOUNTER — CONFERENCE (OUTPATIENT)
Dept: TRANSPLANT | Facility: CLINIC | Age: 63
End: 2018-06-12

## 2018-06-12 LAB
ALBUMIN SERPL BCP-MCNC: 1.9 G/DL
ALBUMIN SERPL BCP-MCNC: 1.9 G/DL
ALP SERPL-CCNC: 72 U/L
ALP SERPL-CCNC: 72 U/L
ALT SERPL W/O P-5'-P-CCNC: 19 U/L
ALT SERPL W/O P-5'-P-CCNC: 19 U/L
ANION GAP SERPL CALC-SCNC: 8 MMOL/L
ANION GAP SERPL CALC-SCNC: 8 MMOL/L
AST SERPL-CCNC: 31 U/L
AST SERPL-CCNC: 31 U/L
BACTERIA SPEC AEROBE CULT: NO GROWTH
BASOPHILS # BLD AUTO: 0.04 K/UL
BASOPHILS NFR BLD: 1.5 %
BILIRUB SERPL-MCNC: 2.4 MG/DL
BILIRUB SERPL-MCNC: 2.4 MG/DL
BUN SERPL-MCNC: 29 MG/DL
BUN SERPL-MCNC: 29 MG/DL
CALCIUM SERPL-MCNC: 8.2 MG/DL
CALCIUM SERPL-MCNC: 8.2 MG/DL
CHLORIDE SERPL-SCNC: 117 MMOL/L
CHLORIDE SERPL-SCNC: 117 MMOL/L
CO2 SERPL-SCNC: 21 MMOL/L
CO2 SERPL-SCNC: 21 MMOL/L
CREAT SERPL-MCNC: 1.6 MG/DL
CREAT SERPL-MCNC: 1.6 MG/DL
DIFFERENTIAL METHOD: ABNORMAL
EOSINOPHIL # BLD AUTO: 0.1 K/UL
EOSINOPHIL NFR BLD: 5 %
ERYTHROCYTE [DISTWIDTH] IN BLOOD BY AUTOMATED COUNT: 17.9 %
EST. GFR  (AFRICAN AMERICAN): 52.6 ML/MIN/1.73 M^2
EST. GFR  (AFRICAN AMERICAN): 52.6 ML/MIN/1.73 M^2
EST. GFR  (NON AFRICAN AMERICAN): 45.5 ML/MIN/1.73 M^2
EST. GFR  (NON AFRICAN AMERICAN): 45.5 ML/MIN/1.73 M^2
GLUCOSE SERPL-MCNC: 98 MG/DL
GLUCOSE SERPL-MCNC: 98 MG/DL
HCT VFR BLD AUTO: 25.3 %
HGB BLD-MCNC: 8.6 G/DL
IMM GRANULOCYTES # BLD AUTO: 0.01 K/UL
IMM GRANULOCYTES NFR BLD AUTO: 0.4 %
INR PPP: 1.7
LYMPHOCYTES # BLD AUTO: 0.5 K/UL
LYMPHOCYTES NFR BLD: 20 %
MAGNESIUM SERPL-MCNC: 2.4 MG/DL
MCH RBC QN AUTO: 33.2 PG
MCHC RBC AUTO-ENTMCNC: 34 G/DL
MCV RBC AUTO: 98 FL
MONOCYTES # BLD AUTO: 0.5 K/UL
MONOCYTES NFR BLD: 17.3 %
NEUTROPHILS # BLD AUTO: 1.5 K/UL
NEUTROPHILS NFR BLD: 55.8 %
NRBC BLD-RTO: 0 /100 WBC
PHOSPHATE SERPL-MCNC: 2.8 MG/DL
PLATELET # BLD AUTO: 41 K/UL
PMV BLD AUTO: 12.6 FL
POTASSIUM SERPL-SCNC: 3.5 MMOL/L
POTASSIUM SERPL-SCNC: 3.5 MMOL/L
PROT SERPL-MCNC: 4.4 G/DL
PROT SERPL-MCNC: 4.4 G/DL
PROTHROMBIN TIME: 17.1 SEC
RBC # BLD AUTO: 2.59 M/UL
SODIUM SERPL-SCNC: 146 MMOL/L
SODIUM SERPL-SCNC: 146 MMOL/L
WBC # BLD AUTO: 2.6 K/UL

## 2018-06-12 PROCEDURE — 97165 OT EVAL LOW COMPLEX 30 MIN: CPT | Mod: NTX

## 2018-06-12 PROCEDURE — 85025 COMPLETE CBC W/AUTO DIFF WBC: CPT | Mod: NTX

## 2018-06-12 PROCEDURE — 80053 COMPREHEN METABOLIC PANEL: CPT | Mod: NTX

## 2018-06-12 PROCEDURE — C9113 INJ PANTOPRAZOLE SODIUM, VIA: HCPCS | Mod: NTX | Performed by: INTERNAL MEDICINE

## 2018-06-12 PROCEDURE — 20600001 HC STEP DOWN PRIVATE ROOM: Mod: NTX

## 2018-06-12 PROCEDURE — 97162 PT EVAL MOD COMPLEX 30 MIN: CPT | Mod: NTX

## 2018-06-12 PROCEDURE — 63600175 PHARM REV CODE 636 W HCPCS: Mod: NTX | Performed by: INTERNAL MEDICINE

## 2018-06-12 PROCEDURE — 25000003 PHARM REV CODE 250: Mod: NTX | Performed by: INTERNAL MEDICINE

## 2018-06-12 PROCEDURE — 95813 EEG EXTND MNTR 61-119 MIN: CPT | Mod: 26,NTX,, | Performed by: PSYCHIATRY & NEUROLOGY

## 2018-06-12 PROCEDURE — 25000003 PHARM REV CODE 250: Mod: NTX | Performed by: STUDENT IN AN ORGANIZED HEALTH CARE EDUCATION/TRAINING PROGRAM

## 2018-06-12 PROCEDURE — 63600175 PHARM REV CODE 636 W HCPCS: Mod: NTX | Performed by: STUDENT IN AN ORGANIZED HEALTH CARE EDUCATION/TRAINING PROGRAM

## 2018-06-12 PROCEDURE — 85610 PROTHROMBIN TIME: CPT | Mod: NTX

## 2018-06-12 PROCEDURE — 83735 ASSAY OF MAGNESIUM: CPT | Mod: NTX

## 2018-06-12 PROCEDURE — 97535 SELF CARE MNGMENT TRAINING: CPT | Mod: NTX

## 2018-06-12 PROCEDURE — 99233 SBSQ HOSP IP/OBS HIGH 50: CPT | Mod: NTX,,, | Performed by: INTERNAL MEDICINE

## 2018-06-12 PROCEDURE — 84100 ASSAY OF PHOSPHORUS: CPT | Mod: NTX

## 2018-06-12 RX ORDER — LACTULOSE 10 G/15ML
30 SOLUTION ORAL EVERY 6 HOURS
Status: DISCONTINUED | OUTPATIENT
Start: 2018-06-12 | End: 2018-06-16 | Stop reason: HOSPADM

## 2018-06-12 RX ORDER — PANTOPRAZOLE SODIUM 40 MG/1
40 TABLET, DELAYED RELEASE ORAL
Status: DISCONTINUED | OUTPATIENT
Start: 2018-06-12 | End: 2018-06-16 | Stop reason: HOSPADM

## 2018-06-12 RX ORDER — CHOLESTYRAMINE 4 G/9G
1 POWDER, FOR SUSPENSION ORAL 2 TIMES DAILY
Status: DISCONTINUED | OUTPATIENT
Start: 2018-06-12 | End: 2018-06-16 | Stop reason: HOSPADM

## 2018-06-12 RX ORDER — HYDROXYZINE HYDROCHLORIDE 10 MG/1
10 TABLET, FILM COATED ORAL ONCE
Status: COMPLETED | OUTPATIENT
Start: 2018-06-12 | End: 2018-06-12

## 2018-06-12 RX ADMIN — RIFAXIMIN 400 MG: 200 TABLET ORAL at 08:06

## 2018-06-12 RX ADMIN — LEVETIRACETAM 1500 MG: 15 INJECTION INTRAVENOUS at 08:06

## 2018-06-12 RX ADMIN — LACTULOSE 30 G: 20 SOLUTION ORAL at 01:06

## 2018-06-12 RX ADMIN — ENOXAPARIN SODIUM 40 MG: 100 INJECTION SUBCUTANEOUS at 04:06

## 2018-06-12 RX ADMIN — DEXTROSE 40 MG: 50 INJECTION, SOLUTION INTRAVENOUS at 08:06

## 2018-06-12 RX ADMIN — MICONAZOLE NITRATE: 20 POWDER TOPICAL at 08:06

## 2018-06-12 RX ADMIN — HYDROXYZINE HYDROCHLORIDE 10 MG: 10 TABLET, FILM COATED ORAL at 04:06

## 2018-06-12 RX ADMIN — CHOLESTYRAMINE 4 G: 4 POWDER, FOR SUSPENSION ORAL at 04:06

## 2018-06-12 RX ADMIN — LACTULOSE 30 G: 20 SOLUTION ORAL at 05:06

## 2018-06-12 RX ADMIN — RIFAXIMIN 400 MG: 200 TABLET ORAL at 04:06

## 2018-06-12 RX ADMIN — CHLORHEXIDINE GLUCONATE 15 ML: 1.2 RINSE ORAL at 08:06

## 2018-06-12 RX ADMIN — PANTOPRAZOLE SODIUM 40 MG: 40 TABLET, DELAYED RELEASE ORAL at 04:06

## 2018-06-12 NOTE — ASSESSMENT & PLAN NOTE
MELD-Na score: 20 at 6/12/2018  4:54 AM  MELD score: 20 at 6/12/2018  4:54 AM  Calculated from:  Serum Creatinine: 1.6 mg/dL at 6/12/2018  4:54 AM  Serum Sodium: 146 mmol/L (Rounded to 137) at 6/12/2018  4:54 AM  Total Bilirubin: 2.4 mg/dL at 6/12/2018  4:54 AM  INR(ratio): 1.7 at 6/12/2018  4:54 AM  Age: 63 years    - Recently placed on transplant list  - LFTs stable  - Lower extremity edema present, becoming decreasingly responsive to diuretics  - See hepatic encephalopathy section   - Hepatology following, appreciate assistance- recommend albumin, lactulose to 3-4 BM's/day, rifaximin, and transfer to liver transplant service when extubated. Patient now extubated and stable for step down to floor

## 2018-06-12 NOTE — SUBJECTIVE & OBJECTIVE
Interval History:   Extubated now.  States he was taking keppra, and was taking lactulose 'but obviously not enough lactulose'  Feels ok today. Lethargic but oriented.      Current Facility-Administered Medications   Medication    dextrose 50% injection 12.5 g    dextrose 50% injection 25 g    enoxaparin injection 40 mg    glucagon (human recombinant) injection 1 mg    glucose chewable tablet 16 g    glucose chewable tablet 24 g    insulin aspart U-100 pen 0-5 Units    lactulose 20 gram/30 mL solution Soln 30 g    levETIRAcetam in NaCl (iso-os) IVPB 1,500 mg    miconazole NITRATE 2 % top powder    pantoprazole EC tablet 40 mg    rifAXImin tablet 400 mg    sodium chloride 0.9% flush 5 mL       Objective:     Vital Signs (Most Recent):  Temp: 98 °F (36.7 °C) (06/12/18 1100)  Pulse: 78 (06/12/18 1300)  Resp: 18 (06/12/18 1300)  BP: 124/60 (06/12/18 1300)  SpO2: 100 % (06/12/18 1300) Vital Signs (24h Range):  Temp:  [97.5 °F (36.4 °C)-98 °F (36.7 °C)] 98 °F (36.7 °C)  Pulse:  [54-94] 78  Resp:  [13-24] 18  SpO2:  [97 %-100 %] 100 %  BP: ()/(42-92) 124/60     Weight: 135.5 kg (298 lb 11.6 oz) (06/12/18 0701)  Body mass index is 41.66 kg/m².    Physical Exam   Constitutional:   Awake and alert   Eyes: No scleral icterus.   Neck: Neck supple. No thyromegaly present.   Cardiovascular: Normal rate and intact distal pulses.    Pulmonary/Chest: Effort normal. No respiratory distress.   Abdominal: Soft. There is no tenderness.   Musculoskeletal: He exhibits edema. He exhibits no tenderness.   Neurological:   Alert and oriented   Skin: No rash noted.   Psychiatric: He has a normal mood and affect. His behavior is normal.   Vitals reviewed.      MELD-Na score: 20 at 6/12/2018  4:54 AM  MELD score: 20 at 6/12/2018  4:54 AM  Calculated from:  Serum Creatinine: 1.6 mg/dL at 6/12/2018  4:54 AM  Serum Sodium: 146 mmol/L (Rounded to 137) at 6/12/2018  4:54 AM  Total Bilirubin: 2.4 mg/dL at 6/12/2018  4:54  AM  INR(ratio): 1.7 at 6/12/2018  4:54 AM  Age: 63 years    Significant Labs:  CBC:   Recent Labs  Lab 06/12/18 0454   WBC 2.60*   RBC 2.59*   HGB 8.6*   HCT 25.3*   PLT 41*     BMP:   Recent Labs  Lab 06/12/18 0454   GLU 98  98   *  146*   K 3.5  3.5   *  117*   CO2 21*  21*   BUN 29*  29*   CREATININE 1.6*  1.6*   CALCIUM 8.2*  8.2*     CMP:   Recent Labs  Lab 06/12/18 0454   GLU 98  98   CALCIUM 8.2*  8.2*   ALBUMIN 1.9*  1.9*   PROT 4.4*  4.4*   *  146*   K 3.5  3.5   CO2 21*  21*   *  117*   BUN 29*  29*   CREATININE 1.6*  1.6*   ALKPHOS 72  72   ALT 19  19   AST 31  31   BILITOT 2.4*  2.4*     Coagulation:   Recent Labs  Lab 06/09/18  0056  06/12/18 0454   INR 1.8*  < > 1.7*   APTT 25.9  --   --    < > = values in this interval not displayed.    Significant Imaging:  Labs: Reviewed

## 2018-06-12 NOTE — CONSULTS
Wound care consult received. Nursing documentation reflects patient with MASD to the buttocks and sacral area. Patient is on Intouch bed with DHIRAJ surface. Recommend frequent changes of any incontinence or moisture related issues, one moisture wicking pad and barrier cream for protection. Please reconsult with any further wound care needs. Nursing to continue care.   AnaP aula SHERMAN, BSN, RN, COCN  [id number]

## 2018-06-12 NOTE — PROGRESS NOTES
[facility]  Critical Care Medicine  Progress Note    Patient Name: Alon Adamson  MRN: [id number]  Admission Date: 6/9/2018  Hospital Length of Stay: 3 days  Code Status: Full Code  Attending Provider: Seamus Talavera*  Primary Care Provider: Mckinley Vides MD   Principal Problem: Hepatic encephalopathy    Subjective:     HPI:  Mr. Adamson is a 62-year-old man with CAD, seizure disorder on Keppra, DMII, and EtOH cirrhosis with recurrent hepatic encephalopathy, recently placed on the transplant list who presents as a transfer from [facility] where he presented with altered mental status and was diagnosed with hepatic encephalopathy. The following history is limited by his intubated status. Per the limited records that accompanied him and his family at bedside, he has been feeling well in his usual state of health until 6/8 when his son found him in his house confused. His wife helps him manage his medications, particularly his lactulose and rifaximin, but she has been out of the house helping with another sick relative, so his compliance is unclear. He was not complaining of any fevers, chill, night sweats, abdominal pain, or stool changes.    At the OSH his labs were mostly stable with our previous studies. He underwent CT head which was normal. He was emergently intubated for airway protection and transferred to [facility] for hepatology evaluation.    Hospital/ICU Course:  Transferred from [facility] 6/9 for hepatology evaluation. Patient originally presented in [place] for hepatic encephalopathy and required intubation for his AMS. HE likely 2/2 non-compliance with lactulose. Diagnostic para completed 6/10 showed no SBP. EEG 6/10 showed intermittent continuous generalized epileptiform discharges so patient was loaded with keppra + BID dosing. Extubated 6/11 and doing well off ventilator.     Interval History/Significant Events:    NATASHA. Patient extubated yesterday and doing well off ventilator. He is stable to step down to liver transplant team.    Review of Systems   Unable to perform ROS: Intubated     Objective:     Vital Signs (Most Recent):  Temp: 98 °F (36.7 °C) (06/12/18 0701)  Pulse: 72 (06/12/18 0701)  Resp: 16 (06/12/18 0701)  BP: 132/60 (06/12/18 0701)  SpO2: 99 % (06/12/18 0701) Vital Signs (24h Range):  Temp:  [97.5 °F (36.4 °C)-98 °F (36.7 °C)] 98 °F (36.7 °C)  Pulse:  [50-83] 72  Resp:  [13-24] 16  SpO2:  [97 %-100 %] 99 %  BP: ()/(42-76) 132/60   Weight: 135.5 kg (298 lb 11.6 oz)  Body mass index is 41.66 kg/m².      Intake/Output Summary (Last 24 hours) at 06/12/18 0745  Last data filed at 06/12/18 0701   Gross per 24 hour   Intake              637 ml   Output             1170 ml   Net             -533 ml       Physical Exam   Constitutional: He appears well-developed and well-nourished. No distress.   HENT:   Head: Normocephalic and atraumatic.   ETT in place   Eyes: Pupils are equal, round, and reactive to light.   Neck: No JVD present.   Cardiovascular: Normal rate and regular rhythm.  Exam reveals no gallop and no friction rub.    No murmur heard.  Pulmonary/Chest: Effort normal and breath sounds normal. No respiratory distress. He has no wheezes.   Abdominal: Soft. Bowel sounds are normal. He exhibits distension (mild). He exhibits no mass. There is no tenderness.   +abdominal striae  +petechial rash   Musculoskeletal: He exhibits edema (2+ LLE bilaterally). He exhibits no tenderness.   Neurological: He displays normal reflexes.   Sedated, no response to verbal or physical stimulation   Skin: Capillary refill takes less than 2 seconds. No rash noted. There is erythema.   Bilateral chronic venous stasis changes       Vents:  Vent Mode: Spont (06/11/18 1219)  Ventilator Initiated: Yes (06/09/18 0041)  Set Rate: 0 bmp (06/11/18 1219)  Vt Set: 450 mL (06/11/18 1219)  Pressure Support: 0 cmH20 (06/11/18  1219)  PEEP/CPAP: 5 cmH20 (06/11/18 1219)  Oxygen Concentration (%): 21 (06/11/18 1200)  Peak Airway Pressure: 8.8 cmH2O (06/11/18 1219)  Plateau Pressure: 0 cmH20 (06/11/18 1219)  Total Ve: 5.39 mL (06/11/18 1219)  Negative Inspiratory Force (cm H2O): -34 (06/11/18 1100)  F/VT Ratio<105 (RSBI): 10427 (06/11/18 1219)  Lines/Drains/Airways     Drain                 Urethral Catheter 06/09/18 0300 Latex 14 Fr. 3 days         Rectal Tube 06/10/18 0600 rectal tube w/ balloon (indicate number of mLs) 2 days          Peripheral Intravenous Line                 Peripheral IV - Single Lumen 06/08/18 Left Upper Arm 4 days         Peripheral IV - Single Lumen 06/08/18 Right Hand 4 days              Significant Labs:    CBC/Anemia Profile:    Recent Labs  Lab 06/12/18  0454   WBC 2.60*   HGB 8.6*   HCT 25.3*   PLT 41*   MCV 98   RDW 17.9*        Chemistries:    Recent Labs  Lab 06/11/18  0347 06/12/18  0454    146*  146*   K 3.5 3.5  3.5   * 117*  117*   CO2 20* 21*  21*   BUN 27* 29*  29*   CREATININE 1.5* 1.6*  1.6*   CALCIUM 7.9* 8.2*  8.2*   ALBUMIN 1.9* 1.9*  1.9*   PROT 4.4* 4.4*  4.4*   BILITOT 2.8* 2.4*  2.4*   ALKPHOS 71 72  72   ALT 18 19  19   AST 27 31  31   MG 1.9 2.4   PHOS 3.1 2.8       Significant Imaging:  None new      Assessment/Plan:     Neuro   Seizures    - Unclear history, on keppra at home  - Keppra level 19  - EEG 6/10 showed continuous generalized epileptiform discharges, so he was loaded on keppra  - Keppra BID        Cardiac/Vascular   Coronary artery disease involving native coronary artery of native heart without angina pectoris    - Stable  - Known CAD diagnosed by aniogram in 3/17 showing istal LAD has a 75% stenosis, normal LCFx and RCA, 75% ostial PDA, for which underwent PCI  - Recently underwent  stress echo read by Dr. Xavier which demonstrated no ischemia at peak  (89%) predicted  - Most recent TTE from 1/2018 significant for normal LVEF with biatrial  enlargement, after which he underwent RHC which was normal  - Resumed ASA        Renal/   Bacteriuria    - Symptoms unclear given mental status  - [place]anca exchanged  - Negative PCT re-assuring  - Off antibiotics        GI   * Hepatic encephalopathy    - Stable  - Suspected etiology non-compliance with home lactulose and rifaximin  - Diagnostic paracentesis ruled out SBP. Initially on vanc + zosyn, now off all antibiotics. PCT negative  - Initial OSH head CT negative.   - Treating with lactulose and rifaximin  - Successfully extubated yesterday. AOx3. Mental status back to baseline, no additional imaging recommended. Patient is ready for step down to floor.        Alcoholic cirrhosis    - See decompensated hepatic cirrhosis        Decompensated hepatic cirrhosis    MELD-Na score: 20 at 6/12/2018  4:54 AM  MELD score: 20 at 6/12/2018  4:54 AM  Calculated from:  Serum Creatinine: 1.6 mg/dL at 6/12/2018  4:54 AM  Serum Sodium: 146 mmol/L (Rounded to 137) at 6/12/2018  4:54 AM  Total Bilirubin: 2.4 mg/dL at 6/12/2018  4:54 AM  INR(ratio): 1.7 at 6/12/2018  4:54 AM  Age: 63 years    - Recently placed on transplant list  - LFTs stable  - Lower extremity edema present, becoming decreasingly responsive to diuretics  - See hepatic encephalopathy section   - Hepatology following, appreciate assistance- recommend albumin, lactulose to 3-4 BM's/day, rifaximin, and transfer to liver transplant service when extubated. Patient now extubated and stable for step down to floor        Other   Endotracheally intubated    - See hepatic encephalopathy        Hypoalbuminemia    - See decompensated hepatic cirrhosis        Bilateral edema of lower extremity    - Administered lasix yesterday           Critical Care Daily Checklist:    A: Awake: RASS Goal/Actual Goal: RASS Goal: 0-->alert and calm  Actual: Galan Agitation Sedation Scale (RASS): Alert and calm   B: Spontaneous Breathing Trial Performed?  Yes, extubated   C: SAT & SBT  Coordinated?  Yes, extubated                 D: Delirium: CAM-ICU Overall CAM-ICU: Negative   E: Early Mobility Performed? Yes   F: Feeding Goal:    Status:     Current Diet Order   Procedures    Diet Adult Regular (IDDSI Level 7)      AS: Analgesia/Sedation None   T: Thromboembolic Prophylaxis SCD, lovenox   H: HOB > 300 Yes   U: Stress Ulcer Prophylaxis (if needed) PPI   G: Glucose Control LDSSI   B: Bowel Function Stool Occurrence: 1 (flexi in place)   I: Indwelling Catheter (Lines & Salamanca) Necessity Rectal tube, Periph IV x2   D: De-escalation of Antimicrobials/Pharmacotherapies Not on abx    Plan for the day/ETD Step down to liver transplant    Code Status:  Family/Goals of Care: Full Code         Critical secondary to Patient has a condition that poses threat to life and bodily function: Hepatic encephalopathy with AMS requiring intubation       Critical care was time spent personally by me on the following activities: development of treatment plan with patient or surrogate and bedside caregivers, discussions with consultants, evaluation of patient's response to treatment, examination of patient, ordering and performing treatments and interventions, ordering and review of laboratory studies, ordering and review of radiographic studies, pulse oximetry, re-evaluation of patient's condition. This critical care time did not overlap with that of any other provider or involve time for any procedures.     Lazara Braxton MD  Critical Care Medicine  [facility]

## 2018-06-12 NOTE — PT/OT/SLP EVAL
Physical Therapy Evaluation    Patient Name:  Alon Adamson   MRN:  [id number]   Co-eval with OT    Recommendations:     Discharge Recommendations:  home with home health   Discharge Equipment Recommendations:  (TBD)   Barriers to discharge: Inaccessible home (5 VANESSA and R HR)    Assessment:     Alon Adamson is a 63 y.o. male admitted with a medical diagnosis of Hepatic encephalopathy.  He presents with the following impairments/functional limitations:  weakness, impaired endurance, impaired self care skills, impaired functional mobilty, gait instability, decreased safety awareness, edema, impaired cardiopulmonary response to activity, impaired skin.     Rehab Prognosis:  good; patient would benefit from acute skilled PT services to address these deficits and reach maximum level of function.      Recent Surgery: * No surgery found *      Plan:     During this hospitalization, patient to be seen 4 x/week to address the above listed problems via gait training, therapeutic activities, therapeutic exercises, neuromuscular re-education  · Plan of Care Expires:  07/11/18   Plan of Care Reviewed with: patient    Subjective     Communicated with RN prior to session.  Patient found in bed upon PT entry to room, agreeable to evaluation.      Chief Complaint: itchy B LE's  Patient comments/goals: to get better and return home   Pain/Comfort:  · Pain Rating 1: 0/10  · Pain Rating Post-Intervention 1: 0/10    Patients cultural, spiritual, Restorationism conflicts given the current situation: none reported     Living Environment:  Pt lives with wife and dependent son in trailer with 5 VANESSA to enter and R HR   Prior to admission, patients level of function was mod indep with ambulation (RW for community distances) and indep with ADL's.  Patient has the following equipment: walker, rolling, cane, straight, lift device.  DME owned (not currently used): none.  Upon discharge, patient will have assistance from  family.    Objective:     Patient found with: telemetry, pulse ox (continuous), blood pressure cuff, peripheral IV, griffin catheter, bowel management system     General Precautions: Standard, fall   Orthopedic Precautions:N/A   Braces: N/A     Exams:  · Cognitive Exam:    · AAOx4  · Follows all commands  · Skin Integrity/Edema:  {  · B LE edema   · B LE dry, scaly skin; rash   · Pt demo'd excessive itching   · RLE ROM: WFL  · RLE Strength: WFL  · LLE ROM: WFL  · LLE Strength: WFL    Functional Mobility:  · Bed Mobility:     · Rolling Right: minimum assistance  · Scooting: minimum assistance  · Supine to Sit: minimum assistance  · Sit to Supine: moderate assistance  · Transfers:     · Sit to Stand:  moderate assistance with no AD from EOB  · Gait: ~6 side steps with mod A toward HOB   · Decreased antoine, decreased step length    AM-PAC 6 CLICK MOBILITY  Total Score:16       Therapeutic Activities and Exercises:  Educated pt on PT role/POC  Educated pt on importance of OOB activity  Pt verbalized understanding    Standing x 1 minute with CGA to assist OT with susanna cleaning     Patient left HOB elevated with all lines intact, call button in reach and RN notified.    GOALS:    Physical Therapy Goals        Problem: Physical Therapy Goal    Goal Priority Disciplines Outcome Goal Variances Interventions   Physical Therapy Goal     PT/OT, PT Ongoing (interventions implemented as appropriate)     Description:  Goals to be met by: 2018    Patient will increase functional independence with mobility by performin. Supine to sit with Supervision - not met  2. Sit to stand transfer with Supervision using LRAD or no AD - not met  3. Bed to chair transfer with Supervision using LRAD or no AD - not met  4. Gait  x 150 feet with Supervision using LRAD - not met  5. Ascend/descend 5 stair with right Handrails Supervision - not met                       History:     Past Medical History:   Diagnosis Date    Anticoagulant  long-term use     Arthritis     Back pain     Cellulitis     Cirrhosis     Coronary artery disease     DM (diabetes mellitus)     Hearing loss     right ear    Hepatic encephalopathy     Hypertension     diagnosed today (11/25/2015) and prescribed toprol    Leg edema     Nephrolithiasis     JACK (obstructive sleep apnea)     Seizures     Splenomegaly        Past Surgical History:   Procedure Laterality Date    APPENDECTOMY      Open    BACK SURGERY      CHOLECYSTECTOMY      laprascopic    COLONOSCOPY N/A 1/25/2018    Procedure: COLONOSCOPY;  Surgeon: Lashawn Myles MD;  Location: [facility] ([address] [place] [address]);  Service: Endoscopy;  Laterality: N/A;    LUMBAR DISCECTOMY      SPLENIC ARTERY EMBOLIZATION  04/08/2016    Dr Taveras    TONSILLECTOMY             Time Tracking:     PT Received On: 06/12/18  PT Start Time: 0859     PT Stop Time: 0921  PT Total Time (min): 22 min     Billable Minutes: Evaluation 15      Shonna Maradiaga, PT, DPT  6/12/2018  [phone]

## 2018-06-12 NOTE — PLAN OF CARE
Problem: Patient Care Overview  Goal: Plan of Care Review  Outcome: Ongoing (interventions implemented as appropriate)  Pt had no acute events overnight. EEG remains in progress. Pt oriented x4 and follows commands. Pt tolerated medication crushed and placed in applesauce. He also was able to drink water. Flexiseal output 500ml for the shift, UOP 350ml for the shift. Pt denies c/o pain. Complete bath was given. Pt in no noted distress at this time. Address concerns and answered questions about plan of care with pt, his wife, and son at the bedside. Will continue to monitor for changes in status.

## 2018-06-12 NOTE — TELEPHONE ENCOUNTER
Patient discussed at conference. Currently on internal hold due to recent episode of HE followed by seizures. Committee determined at this time, patient extubated and awake, alert and oriented x 3.  Awaiting CT scan of head along with neurology consult and recommendations. Based on consults, will determine if patient can be activated on the transplant list.

## 2018-06-12 NOTE — ASSESSMENT & PLAN NOTE
- Unclear history, on keppra at home  - Keppra level 19  - EEG 6/10 showed continuous generalized epileptiform discharges, so he was loaded on keppra  - Keppra BID

## 2018-06-12 NOTE — ASSESSMENT & PLAN NOTE
- Stable  - Known CAD diagnosed by aniogram in 3/17 showing istal LAD has a 75% stenosis, normal LCFx and RCA, 75% ostial PDA, for which underwent PCI  - Recently underwent  stress echo read by Dr. Xavier which demonstrated no ischemia at peak  (89%) predicted  - Most recent TTE from 1/2018 significant for normal LVEF with biatrial enlargement, after which he underwent RHC which was normal  - Resumed ASA

## 2018-06-12 NOTE — PLAN OF CARE
Problem: Physical Therapy Goal  Goal: Physical Therapy Goal  Goals to be met by: 2018    Patient will increase functional independence with mobility by performin. Supine to sit with Supervision - not met  2. Sit to stand transfer with Supervision using LRAD or no AD - not met  3. Bed to chair transfer with Supervision using LRAD or no AD - not met  4. Gait  x 150 feet with Supervision using LRAD - not met  5. Ascend/descend 5 stair with right Handrails Supervision - not met     Outcome: Ongoing (interventions implemented as appropriate)  Eval completed and goals appropriate.

## 2018-06-12 NOTE — PLAN OF CARE
Problem: Patient Care Overview  Goal: Plan of Care Review  Outcome: Ongoing (interventions implemented as appropriate)  No gtts. No acute events throughout day. Plan is to stepdown this evening. VS and assessment per flow sheet, patient progressing towards goals as tolerated, plan of care reviewed with Alon Adamson and family, all concerns addressed, will continue to monitor.

## 2018-06-12 NOTE — ASSESSMENT & PLAN NOTE
64 y/o male listed for liver transplant, now with significant encephalopathy.  Negative SBP on admit. EEG with epileptiform activity.    Recs:  Appreciate epilepsy input  Consider CT / MRI of head  Follow cultures  Continue lactulose and rifaximin   - titrate lactulose to 3 -4 BM/ day  CMP and INR daily  Strict I/Os; daily weights  Check PETH test - pending    Pt will be transferred to LTS  - stepdown to TSU and Dr. Gómez has accepted patient to LTS service

## 2018-06-12 NOTE — SUBJECTIVE & OBJECTIVE
Interval History/Significant Events:   NAEON. Patient extubated yesterday and doing well off ventilator. He is stable to step down to liver transplant team.    Review of Systems   Unable to perform ROS: Intubated     Objective:     Vital Signs (Most Recent):  Temp: 98 °F (36.7 °C) (06/12/18 0701)  Pulse: 72 (06/12/18 0701)  Resp: 16 (06/12/18 0701)  BP: 132/60 (06/12/18 0701)  SpO2: 99 % (06/12/18 0701) Vital Signs (24h Range):  Temp:  [97.5 °F (36.4 °C)-98 °F (36.7 °C)] 98 °F (36.7 °C)  Pulse:  [50-83] 72  Resp:  [13-24] 16  SpO2:  [97 %-100 %] 99 %  BP: ()/(42-76) 132/60   Weight: 135.5 kg (298 lb 11.6 oz)  Body mass index is 41.66 kg/m².      Intake/Output Summary (Last 24 hours) at 06/12/18 0745  Last data filed at 06/12/18 0701   Gross per 24 hour   Intake              637 ml   Output             1170 ml   Net             -533 ml       Physical Exam   Constitutional: He appears well-developed and well-nourished. No distress.   HENT:   Head: Normocephalic and atraumatic.   ETT in place   Eyes: Pupils are equal, round, and reactive to light.   Neck: No JVD present.   Cardiovascular: Normal rate and regular rhythm.  Exam reveals no gallop and no friction rub.    No murmur heard.  Pulmonary/Chest: Effort normal and breath sounds normal. No respiratory distress. He has no wheezes.   Abdominal: Soft. Bowel sounds are normal. He exhibits distension (mild). He exhibits no mass. There is no tenderness.   +abdominal striae  +petechial rash   Musculoskeletal: He exhibits edema (2+ LLE bilaterally). He exhibits no tenderness.   Neurological: He displays normal reflexes.   Sedated, no response to verbal or physical stimulation   Skin: Capillary refill takes less than 2 seconds. No rash noted. There is erythema.   Bilateral chronic venous stasis changes       Vents:  Vent Mode: Spont (06/11/18 1219)  Ventilator Initiated: Yes (06/09/18 0041)  Set Rate: 0 bmp (06/11/18 1219)  Vt Set: 450 mL (06/11/18 1219)  Pressure  Support: 0 cmH20 (06/11/18 1219)  PEEP/CPAP: 5 cmH20 (06/11/18 1219)  Oxygen Concentration (%): 21 (06/11/18 1200)  Peak Airway Pressure: 8.8 cmH2O (06/11/18 1219)  Plateau Pressure: 0 cmH20 (06/11/18 1219)  Total Ve: 5.39 mL (06/11/18 1219)  Negative Inspiratory Force (cm H2O): -34 (06/11/18 1100)  F/VT Ratio<105 (RSBI): 79442 (06/11/18 1219)  Lines/Drains/Airways     Drain                 Urethral Catheter 06/09/18 0300 Latex 14 Fr. 3 days         Rectal Tube 06/10/18 0600 rectal tube w/ balloon (indicate number of mLs) 2 days          Peripheral Intravenous Line                 Peripheral IV - Single Lumen 06/08/18 Left Upper Arm 4 days         Peripheral IV - Single Lumen 06/08/18 Right Hand 4 days              Significant Labs:    CBC/Anemia Profile:    Recent Labs  Lab 06/12/18  0454   WBC 2.60*   HGB 8.6*   HCT 25.3*   PLT 41*   MCV 98   RDW 17.9*        Chemistries:    Recent Labs  Lab 06/11/18  0347 06/12/18  0454    146*  146*   K 3.5 3.5  3.5   * 117*  117*   CO2 20* 21*  21*   BUN 27* 29*  29*   CREATININE 1.5* 1.6*  1.6*   CALCIUM 7.9* 8.2*  8.2*   ALBUMIN 1.9* 1.9*  1.9*   PROT 4.4* 4.4*  4.4*   BILITOT 2.8* 2.4*  2.4*   ALKPHOS 71 72  72   ALT 18 19  19   AST 27 31  31   MG 1.9 2.4   PHOS 3.1 2.8       Significant Imaging:  None new

## 2018-06-12 NOTE — PROCEDURES
DATE OF SERVICE:  06/11/2018 and 06/12/2018    ICU EEG/VIDEO MONITORING REPORT    METHODOLOGY:  Electroencephalographic (EEG) is recorded with electrodes placed   according to the International 10-20 placement system.  Thirty two (32) channels   of digital signal (sampling rate of 512/sec), including T1 and T2, were   simultaneously recorded from the scalp and may include EKG, EMG, and/or eye   monitors.  Recording band pass was 0.1 to 512 Hz.  Digital video recording of   the patient is simultaneously recorded with the EEG.  The patient is instructed   to report clinical symptoms which may occur during the recording session.  EEG   and video recording are stored and archived in digital format.  Activation   procedures, which include photic stimulation, hyperventilation and instructing   patients to perform simple tasks, are done in selected patients.    The EEG is displayed on a monitor screen and can be reviewed using different   montages.  Computer-assisted analysis is employed to detect spike and   electrographic seizure activity.   The entire record is submitted for computer   analysis.  The entire recording is visually reviewed, and the times identified   by computer analysis as being spikes or seizures are reviewed again.    Compressed spectral analysis (CSA) is also performed on the activity recorded   from each individual channel.  This is displayed as a power display of   frequencies from 0 to 30 Hz over time.   The CSA is reviewed looking for   asymmetries in power between homologous areas of the scalp, then compared with   the original EEG recording.    [employer] software was also utilized in the review of this study.  This software   suite analyzes the EEG recording in multiple domains.  Coherence and rhythmicity   are computed to identify EEG sections which may contain organized seizures.    Each channel undergoes analysis to detect the presence of spike and sharp waves   which have special and  morphological characteristics of epileptic activity.  The   routine EEG recording is converted from special into frequency domain.  This is   then displayed comparing homologous areas to identify areas of significant   asymmetry.  Algorithm to identify non-cortically generated artifact is used to   separate artifact from the EEG.    RECORDING TIMES:  Total duration of this study is 26 hours and 32 minutes.    Study begins 06/11/2018 at 07:00 and ends 06/12/2018 at 09:45.    EEG FINDINGS:  This record consists of medium amplitude activity predominantly   in the delta and theta range with 2 Hz delta being the most prominent along with   5 to 7 Hz theta admixed in a roughly symmetric pattern.  At times, the delta   becomes rhythmic and at times higher amplitude, bisynchronous, triphasic   waveforms are seen, which are most prominent in the central and parasagittal   regions.  Runs of high amplitude sharp triphasics come and go and last up to   several minutes at a time without any evolution to suggest ictal activity.  At   times, EMG artifact tends to dominate the record for several pages during   periods of more stimulation.  During more quiescent periods, low amplitude to   medium amplitude delta and theta are predominant.  There are also pages in which   mixed frequency theta activity during wakefulness is seen without triphasic   waves.  There is good variability in the record, but no major evolution.    Towards the end of the record EMG artifact tends to predominate.  No seizures   were seen and no clear focal findings were seen.    INTERPRETATION:  Abnormal EEG due to moderate fluctuating encephalopathy with   intermittent sharp triphasic waves, which are thought to be more reflective of a   fluctuating encephalopathy rather than an epileptiform process.  No seizures   were seen.      NBB/HN  dd: 06/12/2018 14:23:33 (CDT)  td: 06/12/2018 14:44:45 (CDT)  Doc ID   #[id number]  Job ID #[id number]    CC:

## 2018-06-12 NOTE — ASSESSMENT & PLAN NOTE
- Stable  - Suspected etiology non-compliance with home lactulose and rifaximin  - Diagnostic paracentesis ruled out SBP. Initially on vanc + zosyn, now off all antibiotics. PCT negative  - Initial OSH head CT negative.   - Treating with lactulose and rifaximin  - Successfully extubated yesterday. AOx3. Mental status back to baseline. Patient is ready for step down to floor.

## 2018-06-12 NOTE — PLAN OF CARE
Problem: Occupational Therapy Goal  Goal: Occupational Therapy Goal  Goals to be met by: 6/26/18     Patient will increase functional independence with ADLs by performing:    Feeding with Modified [place].  UE Dressing with Supervision.  LE Dressing with Supervision.  Grooming while standing at sink with Supervision.  Toileting from toilet with Supervision for hygiene and clothing management.   Toilet transfer to toilet with Supervision.    Outcome: Ongoing (interventions implemented as appropriate)  OT eval completed, and above goals established. ERROL Delacruz  6/12/2018

## 2018-06-12 NOTE — PT/OT/SLP EVAL
"Occupational Therapy   Evaluation    Name: Alon Adamson  MRN: [id number]  Admitting Diagnosis:  Hepatic encephalopathy      Recommendations:     Discharge Recommendations: home with home health  Discharge Equipment Recommendations:   (TBD closer to d/c)  Barriers to discharge:  None    History:     Occupational Profile:  Living Environment: lives with wife and 15 yo son in [place] with 5 VANESSA and R HR  Previous level of function: (I) with ADL and ambulation; has a lift chair and uses w/c and RW in community; does not drive  Roles and Routines: ; father; does not work  Equipment Owned:  walker, rolling, wheelchair, cane, straight (used in community only )  Assistance upon Discharge: son can assist during the summer until he goes back to school    Past Medical History:   Diagnosis Date    Anticoagulant long-term use     Arthritis     Back pain     Cellulitis     Cirrhosis     Coronary artery disease     DM (diabetes mellitus)     Hearing loss     right ear    Hepatic encephalopathy     Hypertension     diagnosed today (11/25/2015) and prescribed toprol    Leg edema     Nephrolithiasis     JACK (obstructive sleep apnea)     Seizures     Splenomegaly        Past Surgical History:   Procedure Laterality Date    APPENDECTOMY      Open    BACK SURGERY      CHOLECYSTECTOMY      laprascopic    COLONOSCOPY N/A 1/25/2018    Procedure: COLONOSCOPY;  Surgeon: Lashawn Myles MD;  Location: [address];  Service: Endoscopy;  Laterality: N/A;    LUMBAR DISCECTOMY      SPLENIC ARTERY EMBOLIZATION  04/08/2016    Dr Taveras    TONSILLECTOMY         Subjective     Chief Complaint: "No one will do anything about it." (re: itching skin)  Patient/Family stated goals: to get better  Communicated with: RN prior to session.  Pain/Comfort: Denies pain     Patients cultural, spiritual, Restorationism conflicts given the current situation:      Objective:     Patient found with: blood pressure cuff, " telemetry, pulse ox (continuous), bowel management system, griffin catheter, peripheral IV, EEG    General Precautions: Standard, fall, seizure   Orthopedic Precautions:    Braces:       Occupational Performance:    Bed Mobility:    · Patient completed Rolling/Turning to Right with minimum assistance  · Patient completed Scooting/Bridging with minimum assistance  · Patient completed Supine to Sit with minimum assistance  · Patient completed Sit to Supine with moderate assistance    Functional Mobility/Transfers:  · Patient completed Sit <> Stand Transfer with moderate assistance  with  no assistive device  from EOB  · Functional Mobility: Mod A x 6 sidesteps along EOB    Activities of Daily Living:  · Feeding:  stand by assistance simulated  · Grooming: minimum assistance    · UB Dressing: minimum assistance    · LB Dressing: total assistance    · Toileting: maximal assistance      Cognitive/Visual Perceptual:  Oriented to: Person, Place, Time and Situation  Follows Commands/attention: Follows single step  commands  Communication: clear/fluent  Safety awareness/insight to disability: intact  Coping skills/emotional control: Appropriate to situation    Physical Exam:  Postural examination/scapula alignment:    -       No postural abnormalities identified  Sensation:    -       Intact  Upper Extremity Range of Motion:     -       Right Upper Extremity: WFL  -       Left Upper Extremity: WFL  Upper Extremity Strength:    -       Right Upper Extremity: WFL  -       Left Upper Extremity: WFL   Strength:    -       Right Upper Extremity: WFL  -       Left Upper Extremity: WFL  Fine Motor Coordination:    -       WFL; minimally impaired manipulation of objects 2* edema  Gross motor coordination:   WFL  Skin  -        Distal thigh erythema/rash; LE severe scaly, dry skin  Edema  -        Maximum edema in LE; minimum edema in UE    Patient left supine with all lines intact, including EEG, call button in reach and RN  "notified    [facility] 6 Click:  [facility] Total Score: 13    Treatment & Education:  Pt ed on OT POC  Pt sat EOB with SBA while engaged in self-care tasks and pericare in standing  Education:    Assessment:     Alon Adasmon is a 63 y.o. male with a medical diagnosis of Hepatic encephalopathy.  Performance deficits affecting function are weakness, impaired self care skills, impaired functional mobilty, impaired endurance, gait instability, edema, impaired skin, impaired balance, impaired cardiopulmonary response to activity.      Rehab Prognosis:  Good; patient would benefit from acute skilled OT services to address these deficits and reach maximum level of function.         Clinical Decision Makin.  OT Mod:  "Pt evaluation falls under moderate complexity for evaluation coding due to identification of 3-5 performance deficits noted as stated above. Eval required Min/Mod assistance to complete on this date and detailed assessment(s) were utilized. Moreover, an expanded review of history and occupational profile obtained with additional review of cognitive, physical and psychosocial hx."     Plan:     Patient to be seen 4 x/week to address the above listed problems via self-care/home management, therapeutic activities, therapeutic exercises  · Plan of Care Expires: 18  · Plan of Care Reviewed with: patient    This Plan of care has been discussed with the patient who was involved in its development and understands and is in agreement with the identified goals and treatment plan    GOALS:    Occupational Therapy Goals        Problem: Occupational Therapy Goal    Goal Priority Disciplines Outcome Interventions   Occupational Therapy Goal     OT, PT/OT Ongoing (interventions implemented as appropriate)    Description:  Goals to be met by: 18     Patient will increase functional independence with ADLs by performing:    Feeding with Modified [place].  UE Dressing with Supervision.  LE Dressing with " Supervision.  Grooming while standing at sink with Supervision.  Toileting from toilet with Supervision for hygiene and clothing management.   Toilet transfer to toilet with Supervision.                      Time Tracking:     OT Date of Treatment: 06/12/18  OT Start Time: 0857  OT Stop Time: 0920  OT Total Time (min): 23 min    Billable Minutes:Evaluation 12  Self Care/Home Management 10    ERROL Delacruz  6/12/2018

## 2018-06-12 NOTE — PROGRESS NOTES
[facility]  Hepatology  Progress Note    Patient Name: Alon Adamson  MRN: [id number]  Admission Date: 6/9/2018  Hospital Length of Stay: 3 days  Attending Provider: Seamus Talavera*   Primary Care Physician: Mckinley Vides MD  Principal Problem:Hepatic encephalopathy    Subjective:     Transplant status: Pre-transplant    HPI: This is a 64 y/o male hx of Etoh cirrhosis (recently listed for liver transplant, follows with Dr. García) complicated by recurrent HE, seizure d/o on keppra, DMII, CAD who presented as transfer from [facility] for AMS and suspected HE.    History obtained from chart given pt on ventilator and nonresponsive.  He was apparently fairly well and in USOH until 6/8 when he was more confused. His wife usually manages his meds but had been out of town and thus compliance may have been an issue.  He was intubated at OSH prior to transfer.    He is admitted to MICU service, will contact LTS service for them to evaluate patient.        Interval History:   Extubated now.  States he was taking keppra, and was taking lactulose 'but obviously not enough lactulose'  Feels ok today. Lethargic but oriented.      Current Facility-Administered Medications   Medication    dextrose 50% injection 12.5 g    dextrose 50% injection 25 g    enoxaparin injection 40 mg    glucagon (human recombinant) injection 1 mg    glucose chewable tablet 16 g    glucose chewable tablet 24 g    insulin aspart U-100 pen 0-5 Units    lactulose 20 gram/30 mL solution Soln 30 g    levETIRAcetam in NaCl (iso-os) IVPB 1,500 mg    miconazole NITRATE 2 % top powder    pantoprazole EC tablet 40 mg    rifAXImin tablet 400 mg    sodium chloride 0.9% flush 5 mL       Objective:     Vital Signs (Most Recent):  Temp: 98 °F (36.7 °C) (06/12/18 1100)  Pulse: 78 (06/12/18 1300)  Resp: 18 (06/12/18 1300)  BP: 124/60 (06/12/18 1300)  SpO2: 100 % (06/12/18 1300) Vital Signs (24h Range):  Temp:  [97.5 °F (36.4  °C)-98 °F (36.7 °C)] 98 °F (36.7 °C)  Pulse:  [54-94] 78  Resp:  [13-24] 18  SpO2:  [97 %-100 %] 100 %  BP: ()/(42-92) 124/60     Weight: 135.5 kg (298 lb 11.6 oz) (06/12/18 0701)  Body mass index is 41.66 kg/m².    Physical Exam   Constitutional:   Awake and alert   Eyes: No scleral icterus.   Neck: Neck supple. No thyromegaly present.   Cardiovascular: Normal rate and intact distal pulses.    Pulmonary/Chest: Effort normal. No respiratory distress.   Abdominal: Soft. There is no tenderness.   Musculoskeletal: He exhibits edema. He exhibits no tenderness.   Neurological:   Alert and oriented   Skin: No rash noted.   Psychiatric: He has a normal mood and affect. His behavior is normal.   Vitals reviewed.      MELD-Na score: 20 at 6/12/2018  4:54 AM  MELD score: 20 at 6/12/2018  4:54 AM  Calculated from:  Serum Creatinine: 1.6 mg/dL at 6/12/2018  4:54 AM  Serum Sodium: 146 mmol/L (Rounded to 137) at 6/12/2018  4:54 AM  Total Bilirubin: 2.4 mg/dL at 6/12/2018  4:54 AM  INR(ratio): 1.7 at 6/12/2018  4:54 AM  Age: 63 years    Significant Labs:  CBC:   Recent Labs  Lab 06/12/18  0454   WBC 2.60*   RBC 2.59*   HGB 8.6*   HCT 25.3*   PLT 41*     BMP:   Recent Labs  Lab 06/12/18  0454   GLU 98  98   *  146*   K 3.5  3.5   *  117*   CO2 21*  21*   BUN 29*  29*   CREATININE 1.6*  1.6*   CALCIUM 8.2*  8.2*     CMP:   Recent Labs  Lab 06/12/18  0454   GLU 98  98   CALCIUM 8.2*  8.2*   ALBUMIN 1.9*  1.9*   PROT 4.4*  4.4*   *  146*   K 3.5  3.5   CO2 21*  21*   *  117*   BUN 29*  29*   CREATININE 1.6*  1.6*   ALKPHOS 72  72   ALT 19  19   AST 31  31   BILITOT 2.4*  2.4*     Coagulation:   Recent Labs  Lab 06/09/18  0056  06/12/18  0454   INR 1.8*  < > 1.7*   APTT 25.9  --   --    < > = values in this interval not displayed.    Significant Imaging:  Labs: Reviewed    Assessment/Plan:     Decompensated hepatic cirrhosis    64 y/o male listed for liver transplant, now with  significant encephalopathy.  Negative SBP on admit. EEG with epileptiform activity.    Recs:  Appreciate epilepsy input  Consider CT / MRI of head  Follow cultures  Continue lactulose and rifaximin   - titrate lactulose to 3 -4 BM/ day  CMP and INR daily  Strict I/Os; daily weights  Check PETH test - pending    Pt will be transferred to LTS  - stepdown to TSU and Dr. Gómez has accepted patient to LTS service            Thank you for your consult. transfer to LTS    Manny Garcia MD  Hepatology  [facility]

## 2018-06-13 PROBLEM — E46 HYPOALBUMINEMIA DUE TO PROTEIN-CALORIE MALNUTRITION: Status: ACTIVE | Noted: 2018-06-13

## 2018-06-13 PROBLEM — N18.30 CKD (CHRONIC KIDNEY DISEASE), STAGE III: Status: ACTIVE | Noted: 2018-06-13

## 2018-06-13 PROBLEM — R82.71 BACTERIURIA: Status: RESOLVED | Noted: 2018-06-09 | Resolved: 2018-06-13

## 2018-06-13 PROBLEM — E87.20 METABOLIC ACIDOSIS: Status: ACTIVE | Noted: 2018-06-13

## 2018-06-13 PROBLEM — L29.8 CHOLESTATIC PRURITUS: Status: ACTIVE | Noted: 2018-06-13

## 2018-06-13 PROBLEM — E88.09 HYPOALBUMINEMIA DUE TO PROTEIN-CALORIE MALNUTRITION: Status: ACTIVE | Noted: 2018-06-13

## 2018-06-13 PROBLEM — Z97.8 ENDOTRACHEALLY INTUBATED: Status: RESOLVED | Noted: 2018-06-09 | Resolved: 2018-06-13

## 2018-06-13 LAB
ALBUMIN SERPL BCP-MCNC: 1.9 G/DL
ALP SERPL-CCNC: 81 U/L
ALT SERPL W/O P-5'-P-CCNC: 27 U/L
ANION GAP SERPL CALC-SCNC: 8 MMOL/L
AST SERPL-CCNC: 57 U/L
BASOPHILS # BLD AUTO: 0.05 K/UL
BASOPHILS NFR BLD: 1.2 %
BILIRUB SERPL-MCNC: 2.6 MG/DL
BUN SERPL-MCNC: 26 MG/DL
CALCIUM SERPL-MCNC: 8.1 MG/DL
CHLORIDE SERPL-SCNC: 111 MMOL/L
CO2 SERPL-SCNC: 18 MMOL/L
CREAT SERPL-MCNC: 1.5 MG/DL
DIFFERENTIAL METHOD: ABNORMAL
EOSINOPHIL # BLD AUTO: 0.4 K/UL
EOSINOPHIL NFR BLD: 8.3 %
ERYTHROCYTE [DISTWIDTH] IN BLOOD BY AUTOMATED COUNT: 17.7 %
EST. GFR  (AFRICAN AMERICAN): 56.9 ML/MIN/1.73 M^2
EST. GFR  (NON AFRICAN AMERICAN): 49.2 ML/MIN/1.73 M^2
GLUCOSE SERPL-MCNC: 112 MG/DL
HCT VFR BLD AUTO: 27.3 %
HGB BLD-MCNC: 9.4 G/DL
IMM GRANULOCYTES # BLD AUTO: 0.01 K/UL
IMM GRANULOCYTES NFR BLD AUTO: 0.2 %
INR PPP: 1.7
LYMPHOCYTES # BLD AUTO: 0.8 K/UL
LYMPHOCYTES NFR BLD: 19.6 %
MAGNESIUM SERPL-MCNC: 2.3 MG/DL
MCH RBC QN AUTO: 33.5 PG
MCHC RBC AUTO-ENTMCNC: 34.4 G/DL
MCV RBC AUTO: 97 FL
MONOCYTES # BLD AUTO: 0.8 K/UL
MONOCYTES NFR BLD: 19.4 %
NEUTROPHILS # BLD AUTO: 2.2 K/UL
NEUTROPHILS NFR BLD: 51.3 %
NRBC BLD-RTO: 0 /100 WBC
PHOSPHATE SERPL-MCNC: 2.8 MG/DL
PLATELET # BLD AUTO: 49 K/UL
PMV BLD AUTO: 11.4 FL
POTASSIUM SERPL-SCNC: 3.5 MMOL/L
PROT SERPL-MCNC: 4.9 G/DL
PROTHROMBIN TIME: 16.7 SEC
RBC # BLD AUTO: 2.81 M/UL
SODIUM SERPL-SCNC: 137 MMOL/L
WBC # BLD AUTO: 4.23 K/UL

## 2018-06-13 PROCEDURE — 63600175 PHARM REV CODE 636 W HCPCS: Mod: NTX | Performed by: NURSE PRACTITIONER

## 2018-06-13 PROCEDURE — 97530 THERAPEUTIC ACTIVITIES: CPT | Mod: NTX

## 2018-06-13 PROCEDURE — 85610 PROTHROMBIN TIME: CPT | Mod: NTX

## 2018-06-13 PROCEDURE — 83735 ASSAY OF MAGNESIUM: CPT | Mod: NTX

## 2018-06-13 PROCEDURE — P9047 ALBUMIN (HUMAN), 25%, 50ML: HCPCS | Mod: JG,NTX | Performed by: NURSE PRACTITIONER

## 2018-06-13 PROCEDURE — 36415 COLL VENOUS BLD VENIPUNCTURE: CPT | Mod: NTX

## 2018-06-13 PROCEDURE — 25000003 PHARM REV CODE 250: Mod: NTX | Performed by: STUDENT IN AN ORGANIZED HEALTH CARE EDUCATION/TRAINING PROGRAM

## 2018-06-13 PROCEDURE — 80053 COMPREHEN METABOLIC PANEL: CPT | Mod: NTX

## 2018-06-13 PROCEDURE — 99223 1ST HOSP IP/OBS HIGH 75: CPT | Mod: GC,NTX,, | Performed by: PSYCHIATRY & NEUROLOGY

## 2018-06-13 PROCEDURE — 85025 COMPLETE CBC W/AUTO DIFF WBC: CPT | Mod: NTX

## 2018-06-13 PROCEDURE — 63600175 PHARM REV CODE 636 W HCPCS: Mod: NTX | Performed by: STUDENT IN AN ORGANIZED HEALTH CARE EDUCATION/TRAINING PROGRAM

## 2018-06-13 PROCEDURE — 99233 SBSQ HOSP IP/OBS HIGH 50: CPT | Mod: NTX,,, | Performed by: NURSE PRACTITIONER

## 2018-06-13 PROCEDURE — 97802 MEDICAL NUTRITION INDIV IN: CPT | Mod: NTX

## 2018-06-13 PROCEDURE — 25000003 PHARM REV CODE 250: Mod: NTX | Performed by: NURSE PRACTITIONER

## 2018-06-13 PROCEDURE — 84100 ASSAY OF PHOSPHORUS: CPT | Mod: NTX

## 2018-06-13 PROCEDURE — 20600001 HC STEP DOWN PRIVATE ROOM: Mod: NTX

## 2018-06-13 RX ORDER — ALBUMIN HUMAN 250 G/1000ML
25 SOLUTION INTRAVENOUS EVERY 8 HOURS
Status: COMPLETED | OUTPATIENT
Start: 2018-06-13 | End: 2018-06-13

## 2018-06-13 RX ORDER — LEVETIRACETAM 750 MG/1
1500 TABLET ORAL 2 TIMES DAILY
Status: DISCONTINUED | OUTPATIENT
Start: 2018-06-13 | End: 2018-06-16 | Stop reason: HOSPADM

## 2018-06-13 RX ORDER — SPIRONOLACTONE 100 MG/1
200 TABLET, FILM COATED ORAL 2 TIMES DAILY
Status: DISCONTINUED | OUTPATIENT
Start: 2018-06-13 | End: 2018-06-16 | Stop reason: HOSPADM

## 2018-06-13 RX ORDER — SODIUM BICARBONATE 650 MG/1
650 TABLET ORAL 2 TIMES DAILY
Status: DISCONTINUED | OUTPATIENT
Start: 2018-06-13 | End: 2018-06-16 | Stop reason: HOSPADM

## 2018-06-13 RX ORDER — HEPARIN SODIUM 5000 [USP'U]/ML
5000 INJECTION, SOLUTION INTRAVENOUS; SUBCUTANEOUS EVERY 8 HOURS
Status: DISCONTINUED | OUTPATIENT
Start: 2018-06-13 | End: 2018-06-16 | Stop reason: HOSPADM

## 2018-06-13 RX ORDER — FUROSEMIDE 10 MG/ML
80 INJECTION INTRAMUSCULAR; INTRAVENOUS 2 TIMES DAILY
Status: DISCONTINUED | OUTPATIENT
Start: 2018-06-13 | End: 2018-06-14

## 2018-06-13 RX ORDER — HYDROXYZINE HYDROCHLORIDE 25 MG/1
25 TABLET, FILM COATED ORAL EVERY 8 HOURS PRN
Status: DISCONTINUED | OUTPATIENT
Start: 2018-06-13 | End: 2018-06-16 | Stop reason: HOSPADM

## 2018-06-13 RX ADMIN — LEVETIRACETAM 1500 MG: 750 TABLET ORAL at 10:06

## 2018-06-13 RX ADMIN — RIFAXIMIN 550 MG: 550 TABLET ORAL at 10:06

## 2018-06-13 RX ADMIN — FUROSEMIDE 80 MG: 10 INJECTION, SOLUTION INTRAMUSCULAR; INTRAVENOUS at 10:06

## 2018-06-13 RX ADMIN — Medication: at 12:06

## 2018-06-13 RX ADMIN — PANTOPRAZOLE SODIUM 40 MG: 40 TABLET, DELAYED RELEASE ORAL at 03:06

## 2018-06-13 RX ADMIN — ALBUMIN HUMAN 25 G: 0.25 SOLUTION INTRAVENOUS at 10:06

## 2018-06-13 RX ADMIN — MICONAZOLE NITRATE: 20 POWDER TOPICAL at 10:06

## 2018-06-13 RX ADMIN — SPIRONOLACTONE 200 MG: 100 TABLET, FILM COATED ORAL at 10:06

## 2018-06-13 RX ADMIN — LACTULOSE 30 G: 20 SOLUTION ORAL at 11:06

## 2018-06-13 RX ADMIN — SODIUM BICARBONATE 650 MG TABLET 650 MG: at 09:06

## 2018-06-13 RX ADMIN — LACTULOSE 30 G: 20 SOLUTION ORAL at 05:06

## 2018-06-13 RX ADMIN — FUROSEMIDE 80 MG: 10 INJECTION, SOLUTION INTRAMUSCULAR; INTRAVENOUS at 05:06

## 2018-06-13 RX ADMIN — LEVETIRACETAM 1500 MG: 15 INJECTION INTRAVENOUS at 08:06

## 2018-06-13 RX ADMIN — LACTULOSE 30 G: 20 SOLUTION ORAL at 12:06

## 2018-06-13 RX ADMIN — RIFAXIMIN 550 MG: 550 TABLET ORAL at 09:06

## 2018-06-13 RX ADMIN — HEPARIN SODIUM 5000 UNITS: 5000 INJECTION, SOLUTION INTRAVENOUS; SUBCUTANEOUS at 02:06

## 2018-06-13 RX ADMIN — PANTOPRAZOLE SODIUM 40 MG: 40 TABLET, DELAYED RELEASE ORAL at 05:06

## 2018-06-13 RX ADMIN — HEPARIN SODIUM 5000 UNITS: 5000 INJECTION, SOLUTION INTRAVENOUS; SUBCUTANEOUS at 10:06

## 2018-06-13 RX ADMIN — MICONAZOLE NITRATE: 20 POWDER TOPICAL at 08:06

## 2018-06-13 RX ADMIN — CHOLESTYRAMINE 4 G: 4 POWDER, FOR SUSPENSION ORAL at 08:06

## 2018-06-13 RX ADMIN — ALBUMIN HUMAN 25 G: 0.25 SOLUTION INTRAVENOUS at 02:06

## 2018-06-13 RX ADMIN — CHOLESTYRAMINE 4 G: 4 POWDER, FOR SUSPENSION ORAL at 10:06

## 2018-06-13 NOTE — ASSESSMENT & PLAN NOTE
- c/o significant itching  - check bile acids in AM  - continue hydroxyzine, benadryl cream, and cholestyramine

## 2018-06-13 NOTE — PROGRESS NOTES
[facility]  Liver Transplant  Progress Note    Patient Name: Alon Adamson  MRN: [id number]  Admission Date: 6/9/2018  Hospital Length of Stay: 4 days  Code Status: Full Code  Primary Care Provider: Mckinley Vides MD    Subjective:     History of Present Illness:  Mr. Adamson is a 62-year-old man with CAD, seizure disorder on Keppra, DMII, and ETOH cirrhosis with recurrent hepatic encephalopathy, recently placed on the transplant list who presents as a transfer from [facility] where he presented with altered mental status and was diagnosed with hepatic encephalopathy. [place] [facility] [place] for further acute care and hepatology evaluation. Per records, son found Mr. Adamson confused at home, reported patient's wife helps manage medication but she was out of town, so unsure if patient was compliant with medications on his own. Infectious work up initiated on admission, blood and urine cultures with NGTD. Head CT was done at previous hospital, report showed no acute process. He was emergently intubated at previous facility before [place] [facility] Diagnostic para completed 6/10 showed no SBP, WBC 69, 22%. EEG 6/10 showed intermittent continuous generalized epileptiform discharges so patient was loaded with keppra + BID dosing. Extubated 6/11.        Hospital Course:  Placed on internal hold upon admission due to critical condition. Transferred to LTS service 6/13. Neurology consulted for EEG findings, recommended increasing home dose keppra from 500mg BID to 1500mg BID, EEG without seizure activity. PETH pending 6/11. Cultures remain NGTD.     Interval History: no acute events overnight. Transferred to floor from ICU. AAox3, delayed responses, mild asterixis. On lactulose and rifaximin, having BM's with FMS, to be removed today. Neuro consulted for EEG findings, rec continue keppra at current dose. Blood and urine cultures 6/9 NGTD.  PT/OT ordered. +3/+4 BLE edema, restart spironolactone and lasix 80mg IV BID with albumin x 2 doses. Kidney function stable. C/o significant itching, continue benadryl cream, hydroxyzine, and cholestyramine.     Scheduled Meds:   albumin human 25%  25 g Intravenous Q8H    cholestyramine  1 packet Oral BID    furosemide  80 mg Intravenous BID    heparin (porcine)  5,000 Units Subcutaneous Q8H    lactulose  30 g Oral Q6H    levETIRAcetam  1,500 mg Oral BID    miconazole NITRATE 2 %   Topical (Top) BID    pantoprazole  40 mg Oral BID AC    rifAXImin  550 mg Oral BID    sodium bicarbonate  650 mg Oral BID    spironolactone  200 mg Oral BID     Continuous Infusions:  PRN Meds:dextrose 50%, dextrose 50%, diphenhydrAMINE-zinc acetate 1-0.1%, glucagon (human recombinant), glucose, glucose, hydrOXYzine HCl, insulin aspart U-100, sodium chloride 0.9%    Review of Systems   Constitutional: Positive for appetite change. Negative for chills, fatigue and fever.   Respiratory: Negative for cough, chest tightness and shortness of breath.    Cardiovascular: Positive for leg swelling. Negative for chest pain and palpitations.   Gastrointestinal: Positive for abdominal distention and diarrhea (on lactulose). Negative for abdominal pain, constipation, nausea and vomiting.   Genitourinary: Negative for difficulty urinating, dysuria, frequency and urgency.   Musculoskeletal: Negative for back pain and neck pain.   Skin: Negative for color change, pallor, rash and wound.        pruritis   Neurological: Positive for weakness. Negative for dizziness and headaches.   Psychiatric/Behavioral: Negative for confusion and sleep disturbance.     Objective:     Vital Signs (Most Recent):  Temp: 98 °F (36.7 °C) (06/13/18 1206)  Pulse: 78 (06/13/18 1206)  Resp: 18 (06/13/18 1206)  BP: (!) 145/65 (06/13/18 1206)  SpO2: 97 % (06/13/18 1206) Vital Signs (24h Range):  Temp:  [97.9 °F (36.6 °C)-98.4 °F (36.9 °C)] 98 °F (36.7 °C)  Pulse:   [] 78  Resp:  [16-25] 18  SpO2:  [97 %-100 %] 97 %  BP: (114-145)/(53-79) 145/65     Weight: (!) 138.2 kg (304 lb 10.8 oz)  Body mass index is 42.49 kg/m².    Intake/Output - Last 3 Shifts       06/11 0700 - 06/12 0659 06/12 0700 - 06/13 0659 06/13 0700 - 06/14 0659    P.O.  1800     I.V. (mL/kg) 443.4 (3.3) 160 (1.2)     NG/GT 20      IV Piggyback 200 200     Total Intake(mL/kg) 663.4 (4.9) 2160 (15.6)     Urine (mL/kg/hr) 660 (0.2) 935 (0.3)     Stool 550 (0.2) 800 (0.2)     Total Output 1210 1735      Net -546.6 +425             Stool Occurrence 3 x 2 x           Physical Exam   Constitutional: He is oriented to person, place, and time. He appears well-nourished. He is cooperative.   Eyes: No scleral icterus.   Cardiovascular: Normal rate, regular rhythm, normal heart sounds, intact distal pulses and normal pulses.    No murmur heard.  Pulmonary/Chest: Effort normal. No respiratory distress. He has decreased breath sounds in the right lower field and the left lower field. He has no wheezes. He has no rales.   Abdominal: Soft. Bowel sounds are normal. He exhibits distension. He exhibits no ascites. There is no tenderness. There is no rebound and no guarding.   Musculoskeletal: Normal range of motion. He exhibits edema (+3/+4 edema). He exhibits no tenderness.   Neurological: He is alert and oriented to person, place, and time. He has normal strength.   Skin: Skin is warm, dry and intact. No rash noted. No erythema. There is pallor.   BLE distal erythema with significant flaking and crusts   Psychiatric: He has a normal mood and affect. His speech is normal and behavior is normal.   Nursing note and vitals reviewed.      Laboratory:  Immunosuppressants     None        CBC:   Recent Labs  Lab 06/13/18  0856   WBC 4.23   RBC 2.81*   HGB 9.4*   HCT 27.3*   PLT 49*   MCV 97   MCH 33.5*   MCHC 34.4     CMP:   Recent Labs  Lab 06/13/18  0405   *   CALCIUM 8.1*   ALBUMIN 1.9*   PROT 4.9*      K 3.5    CO2 18*   *   BUN 26*   CREATININE 1.5*   ALKPHOS 81   ALT 27   AST 57*   BILITOT 2.6*     Coagulation:   Recent Labs  Lab 06/09/18  0056  06/13/18  0405   INR 1.8*  < > 1.7*   APTT 25.9  --   --    < > = values in this interval not displayed.  Labs within the past 24 hours have been reviewed.    Diagnostic Results:  I have personally reviewed all pertinent imaging studies.    Assessment/Plan:     * Hepatic encephalopathy    - acute on chronic  - infectious work up initiated on admission, blood and urine cultures 6/9 NGTD, off all antibiotics  - diagnostic para 6/10, no SBP  - aaox3, continue lactulose and rifaximin        CKD (chronic kidney disease), stage III    - baseline Cr 1.4-1.6  - restart home lasix and spironolactone with albumin  - monitor kidney function        Cholestatic pruritus    - c/o significant itching  - check bile acids in AM  - continue hydroxyzine, benadryl cream, and cholestyramine        Metabolic acidosis    - start po bicarb. Monitor.         Hypoalbuminemia due to protein-calorie malnutrition    - dietary consulted, start boost, monitor oral intake.         Alcoholic cirrhosis    - see decompensated hepatic cirrhosis        Decompensated hepatic cirrhosis    - ESLD 2/2 ETOH cirrhosis, listed for liver transplant MELD 23, on internal hold  - PT/OT ordered for weakness  - PETH pending 6/11    MELD-Na score: 20 at 6/13/2018  4:05 AM  MELD score: 20 at 6/13/2018  4:05 AM  Calculated from:  Serum Creatinine: 1.5 mg/dL at 6/13/2018  4:05 AM  Serum Sodium: 137 mmol/L at 6/13/2018  4:05 AM  Total Bilirubin: 2.6 mg/dL at 6/13/2018  4:05 AM  INR(ratio): 1.7 at 6/13/2018  4:05 AM  Age: 63 years        Coronary artery disease involving native coronary artery of native heart without angina pectoris    - Stable  - Known CAD diagnosed by aniogram in 3/17 showing distal LAD has a 75% stenosis, normal LCFx and RCA, 75% ostial PDA, for which underwent PCI  - Recently underwent  stress echo read  by Dr. Xavier which demonstrated no ischemia at peak  (89%) predicted  - Most recent TTE from 1/2018 significant for normal LVEF with biatrial enlargement, after which he underwent RHC which was normal  - Resumed ASA, continue tele        Seizures    - h/o seizures on keppra 500mg BID  - admit with AMS, EEG 6/10 with encephalopathy  - neuro consulted for EEG findings, no seizures seen on EEG, rec increase keppra to 1500mg BID        Bilateral edema of lower extremity    - restart home spironolactone 200mg BID  - start lasix 80mg IV BID with albumin x 2 doses today  - low Na diet            VTE Risk Mitigation         Ordered     heparin (porcine) injection 5,000 Units  Every 8 hours      06/13/18 1124     IP VTE HIGH RISK PATIENT  Once      06/09/18 0050     Place sequential compression device  Until discontinued      06/09/18 0050          The patients clinical status was discussed at multidisplinary rounds, involving transplant surgery, transplant medicine, pharmacy, nursing, nutrition, and social work    Discharge Planning: not stable for discharge at this time.    Rissa Regalado DNP  Liver Transplant  [facility]

## 2018-06-13 NOTE — PLAN OF CARE
Problem: Patient Care Overview  Goal: Plan of Care Review  Outcome: Ongoing (interventions implemented as appropriate)  Recommendations     1. Add low sodium diet restrictions to current regular diet order.   2. Add Boost Plus ONS if PO intake consistently <50%.   3. RD to monitor & follow-up.

## 2018-06-13 NOTE — ASSESSMENT & PLAN NOTE
- Stable  - Known CAD diagnosed by aniogram in 3/17 showing distal LAD has a 75% stenosis, normal LCFx and RCA, 75% ostial PDA, for which underwent PCI  - Recently underwent  stress echo read by Dr. Xavier which demonstrated no ischemia at peak  (89%) predicted  - Most recent TTE from 1/2018 significant for normal LVEF with biatrial enlargement, after which he underwent RHC which was normal  - Resumed ASA, continue tele

## 2018-06-13 NOTE — CONSULTS
[facility]  Neurology  Consult Note    Patient Name: Alon Adamson  MRN: [id number]  Admission Date: 6/9/2018  Hospital Length of Stay: 4 days  Code Status: Full Code   Attending Provider: Regulo Gómez MD   Consulting Provider: Erin Brian PA-C  Primary Care Physician: Mckinley Vides MD  Principal Problem:Hepatic encephalopathy    Inpatient consult to Neurology  Consult performed by: ERIN BRIAN  Consult ordered by: DARIUS ALCOCER         Subjective:     Chief Complaint:  SEIZURES     HPI:   63 y.o. Male with DMII, CAD, ETOH cirrhosis and recurrent hepatic encephalopathy on transplant list and seizure disorder was transferred from [facility] 6/9/18 after initially presenting with AMS after son found him confused at his home on 6/8/18. At OSH, he was diagnosed with hepatic encephalopathy. Per chart review, his wife normally helps with medications, but she was out of town and there is concern for medication noncompliance.  At OSH, he underwent CT head which was read as normal. Diagnostic paracentesis was negative for SBP. He was emergently intubated for airway protection and transferred to [facility] for hepatology evaluation. Currently listed for transplant and receiving lactulose and rifaximin. EEG 6/10 (17 hr) showed intermittent sharp rhythmic triphasic GPEDS seen on and off for several minutes with spontaneous resolution and without clear seizures. Frequent triphasic runs decreased at end of study. His Keppra was increased from 500 mg BID to 1.5 g BID. He was extubated 6/11 and stepped down to the floor. Neurology consulted 6/13 for AED recommendations.      Past Medical History:   Diagnosis Date    Anticoagulant long-term use     Arthritis     Back pain     Cellulitis     Cirrhosis     Coronary artery disease     DM (diabetes mellitus)     Hearing loss     right ear    Hepatic encephalopathy     Hypertension     diagnosed today  (11/25/2015) and prescribed toprol    Leg edema     Nephrolithiasis     JACK (obstructive sleep apnea)     Seizures     Splenomegaly      Past Surgical History:   Procedure Laterality Date    APPENDECTOMY      Open    BACK SURGERY      CHOLECYSTECTOMY      laprascopic    COLONOSCOPY N/A 1/25/2018    Procedure: COLONOSCOPY;  Surgeon: Lashawn Myles MD;  [facility] ([address] [place] [address]);  Service: Endoscopy;  Laterality: N/A;    LUMBAR DISCECTOMY      SPLENIC ARTERY EMBOLIZATION  04/08/2016    Dr Taveras    TONSILLECTOMY       Review of patient's allergies indicates:   Allergen Reactions    Nsaids (non-steroidal anti-inflammatory drug)      D/t to liver disease.    Tylenol [acetaminophen]      D/t liver disease.    Penicillins Other (See Comments)     Tolerated pip-tazo in 8/2016 without issue  Since childhood was told not to take it     Current Neurological Medications:   Keppra 1.5 g BID    No current facility-administered medications on file prior to encounter.      Current Outpatient Prescriptions on File Prior to Encounter   Medication Sig    ascorbic acid (VITAMIN C) 1000 MG tablet Take 1,000 mg by mouth once daily.    aspirin (ECOTRIN) 81 MG EC tablet Take 1 tablet (81 mg total) by mouth once daily.    atorvastatin (LIPITOR) 40 MG tablet TAKE 1 TABLET(40 MG) BY MOUTH EVERY DAY    docusate sodium (COLACE) 100 MG capsule Take 100 mg by mouth once daily.     ferrous gluconate 270 mg (27 mg iron) Tab Take 1 tablet by mouth once daily.    fish oil-omega-3 fatty acids 300-1,000 mg capsule Take 2 capsules (2 g total) by mouth once daily. (Patient taking differently: Take 2 g by mouth 2 (two) times daily. )    furosemide (LASIX) 80 MG tablet Take 1 tablet (80 mg total) by mouth 2 (two) times daily.    GENERLAC 10 gram/15 mL solution Take 60 mLs (40 g total) by mouth 3 (three) times daily. 60mg three times per day (Patient taking differently: Take 60 mLs by mouth 3 (three) times daily. )     levETIRAcetam (KEPPRA) 500 MG Tab Take 1 tablet (500 mg total) by mouth 2 (two) times daily.    magnesium oxide (MAG-OX) 400 mg tablet Take 1 tablet (400 mg total) by mouth once daily.    ondansetron (ZOFRAN) 4 MG tablet Take 1 tablet (4 mg total) by mouth every 8 (eight) hours as needed for Nausea.    pantoprazole (PROTONIX) 40 MG tablet Take 1 tablet (40 mg total) by mouth 2 (two) times daily before meals.    rifAXIMin (XIFAXAN) 550 mg Tab Take 1 tablet (550 mg total) by mouth 2 (two) times daily.    spironolactone (ALDACTONE) 100 MG tablet Take 2 tablets (200 mg total) by mouth 2 (two) times daily.    tramadol (ULTRAM) 50 mg tablet Take 50 mg by mouth every 6 (six) hours as needed for Pain.     Family History     None        Social History Main Topics    Smoking status: Never Smoker    Smokeless tobacco: Current User     Types: Chew    Alcohol use No    Drug use: No    Sexual activity: Not on file     Review of Systems   Neurological: Positive for tremors. Negative for speech difficulty, weakness, numbness and headaches.   Psychiatric/Behavioral: Positive for confusion.     Objective:     Vital Signs (Most Recent):  Temp: 98.4 °F (36.9 °C) (06/13/18 0725)  Pulse: 87 (06/13/18 1100)  Resp: 18 (06/13/18 0725)  BP: 132/69 (06/13/18 0725)  SpO2: 97 % (06/13/18 0725) Vital Signs (24h Range):  Temp:  [97.9 °F (36.6 °C)-98.4 °F (36.9 °C)] 98.4 °F (36.9 °C)  Pulse:  [] 87  Resp:  [16-25] 18  SpO2:  [97 %-100 %] 97 %  BP: (114-142)/(53-79) 132/69     Weight: (!) 138.2 kg (304 lb 10.8 oz)  Body mass index is 42.49 kg/m².    Physical Exam   Neurological: He has a normal Finger-Nose-Finger Test.   Reflex Scores:       Bicep reflexes are 2+ on the right side and 2+ on the left side.       Brachioradialis reflexes are 2+ on the right side and 2+ on the left side.       Patellar reflexes are 2+ on the right side and 2+ on the left side.  Psychiatric: His speech is normal.     NEUROLOGICAL EXAMINATION:      MENTAL STATUS   Oriented to person.   Oriented to place.   Follows 2 step commands.   Speech: speech is normal   Level of consciousness: alert  Normal comprehension.        Oriented to family in room  Able to hold a conversation about his favorite sports teams     CRANIAL NERVES     CN III, IV, VI   Right pupil: Shape: regular.   Left pupil: Shape: regular.   Nystagmus: none   Ophthalmoparesis: none    CN V   Facial sensation intact.     CN VII   Facial expression full, symmetric.     CN VIII   Hearing: intact    CN XII   CN XII normal.     MOTOR EXAM   Muscle bulk: normal  Overall muscle tone: normal  Right arm pronator drift: absent  Left arm pronator drift: absent    Strength   Right deltoid: 4/5  Left deltoid: 4/5  Right biceps: 5/5  Left biceps: 5/5  Right triceps: 5/5  Left triceps: 5/5  Right interossei: 5/5  Left interossei: 5/5  Right iliopsoas: 5/5  Left iliopsoas: 5/5  Right anterior tibial: 5/5  Left anterior tibial: 5/5  Right posterior tibial: 5/5  Left posterior tibial: 5/5       +asterixis     REFLEXES     Reflexes   Right brachioradialis: 2+  Left brachioradialis: 2+  Right biceps: 2+  Left biceps: 2+  Right patellar: 2+  Left patellar: 2+    SENSORY EXAM   Light touch normal.     GAIT AND COORDINATION     Gait  Gait: (deferred)     Coordination   Finger to nose coordination: normal    Significant Labs:   Hemoglobin A1c:   Recent Labs  Lab 06/09/18  0056   HGBA1C 5.7*     Blood Culture: No results for input(s): LABBLOO in the last 48 hours.  CBC:   Recent Labs  Lab 06/12/18  0454 06/13/18  0856   WBC 2.60* 4.23   HGB 8.6* 9.4*   HCT 25.3* 27.3*   PLT 41* 49*     CMP:   Recent Labs  Lab 06/12/18  0454 06/13/18  0405   GLU 98  98 112*   *  146* 137   K 3.5  3.5 3.5   *  117* 111*   CO2 21*  21* 18*   BUN 29*  29* 26*   CREATININE 1.6*  1.6* 1.5*   CALCIUM 8.2*  8.2* 8.1*   MG 2.4 2.3   PROT 4.4*  4.4* 4.9*   ALBUMIN 1.9*  1.9* 1.9*   BILITOT 2.4*  2.4* 2.6*   ALKPHOS 72  72  81   AST 31  31 57*   ALT 19  19 27   ANIONGAP 8  8 8   EGFRNONAA 45.5*  45.5* 49.2*     Inflammatory Markers: No results for input(s): SEDRATE, CRP, PROCAL in the last 48 hours.  Urine Culture: No results for input(s): LABURIN in the last 48 hours.  Urine Studies: No results for input(s): COLORU, APPEARANCEUA, PHUR, SPECGRAV, PROTEINUA, GLUCUA, KETONESU, BILIRUBINUA, OCCULTUA, NITRITE, UROBILINOGEN, LEUKOCYTESUR, RBCUA, WBCUA, BACTERIA, SQUAMEPITHEL, HYALINECASTS in the last 48 hours.    Invalid input(s): WRIGHTSUR  All pertinent lab results from the past 24 hours have been reviewed.    EEG (17 hr 17 min) on 6/10/18:   Abnormal EEG due to moderate fluctuating encephalopathy with   intermittent sharp rhythmic triphasic GPEDs seen on and off for a period of up   to several minutes at a time with spontaneous resolution and without clear   seizures.  The frequency of triphasic runs does decrease towards the end of this   study as well.    Significant Imaging: I have reviewed and interpreted all pertinent imaging results/findings within the past 24 hours.    Assessment and Plan:     * Hepatic encephalopathy    Continue lactulose and rifaximin  -contributing to triphasic waves seen on EEG      Alcoholic cirrhosis    Management per primary team      Seizures    On keppra 500 mg BID at home presented with AMS and found to have recurrent hepatic encephalopathy. Extended EEG 6/10 with moderate fluctuating encephalopathy and intermittent sharp rhythmic triphasic GPEDs on and off for a period of up to several minutes at a time with spontaneous resolution and without clear seizures.  The frequency of triphasic runs does decrease towards the end of this study as well.    -continue Keppra 1.5 g BID        VTE Risk Mitigation         Ordered     heparin (porcine) injection 5,000 Units  Every 8 hours      06/13/18 1124     IP VTE HIGH RISK PATIENT  Once      06/09/18 0050     Place sequential compression device  Until  discontinued      06/09/18 0050        Thank you for your consult. I will sign off. Please contact us if you have any additional questions.    Erin Brian PA-C  General Neurology Consult  Neuro Consult Spectralink # [phone]

## 2018-06-13 NOTE — HOSPITAL COURSE
On admit, was placed on internal hold due to critical condition. Transferred to LTS service 6/13. Neurology consulted for EEG findings, recommended increasing home dose keppra from 500mg BID to 1500mg BID, EEG without seizure activity. OK to remove from internal HOLD. PETH pending 6/11. Cultures remain NGTD.     Interval History: no acute events overnight.  AAOx4.  Cont Lactulose and Rifaximin.  Pt having 2-4 BMs daily.  Neuro was consulted for EEG findings, rec continue keppra at current dose and signed off. Pt taken off internal hold 6/14/18.  Blood cx NGTD.  Urine cultures 6/9 NGTD.  PT following and recommend [facility] w PT.  OT following. +2 BLE edema improved.  Cont Spironolactone 200 mg and Lasix 60mg IV BID.  Kidney function stable. Pruritis managed w Aveeno cream, hydroxyzine, and cholestyramine.  Possible discharge over the weekend.

## 2018-06-13 NOTE — ASSESSMENT & PLAN NOTE
On keppra 500 mg BID at home presented with AMS and found to have recurrent hepatic encephalopathy. Extended EEG 6/10 with moderate fluctuating encephalopathy and intermittent sharp rhythmic triphasic GPEDs on and off for a period of up to several minutes at a time with spontaneous resolution and without clear seizures.  The frequency of triphasic runs does decrease towards the end of this study as well.    -continue Keppra 1.5 g BID

## 2018-06-13 NOTE — ASSESSMENT & PLAN NOTE
- see decompensated hepatic cirrhosis   Numbness, color, or temperature change to extremity/Pain not relieved by Medications/Fever greater than 101/GYN Fever>100.4/Excessive Diarrhea/Bleeding that does not stop Excessive Diarrhea/Bleeding that does not stop/Numbness, color, or temperature change to extremity/Unable to Urinate/GYN Fever>100.4/Pain not relieved by Medications/Persistent Nausea and Vomiting/red hot irritated skin or pussy drainage from incisions or passing large blood clots or saturating a peripad in 1 hour

## 2018-06-13 NOTE — SUBJECTIVE & OBJECTIVE
Scheduled Meds:   albumin human 25%  25 g Intravenous Q8H    cholestyramine  1 packet Oral BID    furosemide  80 mg Intravenous BID    heparin (porcine)  5,000 Units Subcutaneous Q8H    lactulose  30 g Oral Q6H    levETIRAcetam  1,500 mg Oral BID    miconazole NITRATE 2 %   Topical (Top) BID    pantoprazole  40 mg Oral BID AC    rifAXImin  550 mg Oral BID    sodium bicarbonate  650 mg Oral BID    spironolactone  200 mg Oral BID     Continuous Infusions:  PRN Meds:dextrose 50%, dextrose 50%, diphenhydrAMINE-zinc acetate 1-0.1%, glucagon (human recombinant), glucose, glucose, hydrOXYzine HCl, insulin aspart U-100, sodium chloride 0.9%    Review of Systems   Constitutional: Positive for appetite change. Negative for chills, fatigue and fever.   Respiratory: Negative for cough, chest tightness and shortness of breath.    Cardiovascular: Positive for leg swelling. Negative for chest pain and palpitations.   Gastrointestinal: Positive for abdominal distention and diarrhea (on lactulose). Negative for abdominal pain, constipation, nausea and vomiting.   Genitourinary: Negative for difficulty urinating, dysuria, frequency and urgency.   Musculoskeletal: Negative for back pain and neck pain.   Skin: Negative for color change, pallor, rash and wound.        pruritis   Neurological: Positive for weakness. Negative for dizziness and headaches.   Psychiatric/Behavioral: Negative for confusion and sleep disturbance.     Objective:     Vital Signs (Most Recent):  Temp: 98 °F (36.7 °C) (06/13/18 1206)  Pulse: 78 (06/13/18 1206)  Resp: 18 (06/13/18 1206)  BP: (!) 145/65 (06/13/18 1206)  SpO2: 97 % (06/13/18 1206) Vital Signs (24h Range):  Temp:  [97.9 °F (36.6 °C)-98.4 °F (36.9 °C)] 98 °F (36.7 °C)  Pulse:  [] 78  Resp:  [16-25] 18  SpO2:  [97 %-100 %] 97 %  BP: (114-145)/(53-79) 145/65     Weight: (!) 138.2 kg (304 lb 10.8 oz)  Body mass index is 42.49 kg/m².    Intake/Output - Last 3 Shifts       06/11 0700 -  06/12 0659 06/12 0700 - 06/13 0659 06/13 0700 - 06/14 0659    P.O.  1800     I.V. (mL/kg) 443.4 (3.3) 160 (1.2)     NG/GT 20      IV Piggyback 200 200     Total Intake(mL/kg) 663.4 (4.9) 2160 (15.6)     Urine (mL/kg/hr) 660 (0.2) 935 (0.3)     Stool 550 (0.2) 800 (0.2)     Total Output 1210 1735      Net -546.6 +425             Stool Occurrence 3 x 2 x           Physical Exam   Constitutional: He is oriented to person, place, and time. He appears well-nourished. He is cooperative.   Eyes: No scleral icterus.   Cardiovascular: Normal rate, regular rhythm, normal heart sounds, intact distal pulses and normal pulses.    No murmur heard.  Pulmonary/Chest: Effort normal. No respiratory distress. He has decreased breath sounds in the right lower field and the left lower field. He has no wheezes. He has no rales.   Abdominal: Soft. Bowel sounds are normal. He exhibits distension. He exhibits no ascites. There is no tenderness. There is no rebound and no guarding.   Musculoskeletal: Normal range of motion. He exhibits edema (+3/+4 edema). He exhibits no tenderness.   Neurological: He is alert and oriented to person, place, and time. He has normal strength.   Skin: Skin is warm, dry and intact. No rash noted. No erythema. There is pallor.   BLE distal erythema with significant flaking and crusts   Psychiatric: He has a normal mood and affect. His speech is normal and behavior is normal.   Nursing note and vitals reviewed.      Laboratory:  Immunosuppressants     None        CBC:   Recent Labs  Lab 06/13/18  0856   WBC 4.23   RBC 2.81*   HGB 9.4*   HCT 27.3*   PLT 49*   MCV 97   MCH 33.5*   MCHC 34.4     CMP:   Recent Labs  Lab 06/13/18  0405   *   CALCIUM 8.1*   ALBUMIN 1.9*   PROT 4.9*      K 3.5   CO2 18*   *   BUN 26*   CREATININE 1.5*   ALKPHOS 81   ALT 27   AST 57*   BILITOT 2.6*     Coagulation:   Recent Labs  Lab 06/09/18  0056  06/13/18  0405   INR 1.8*  < > 1.7*   APTT 25.9  --   --    < > =  values in this interval not displayed.  Labs within the past 24 hours have been reviewed.    Diagnostic Results:  I have personally reviewed all pertinent imaging studies.

## 2018-06-13 NOTE — NURSING TRANSFER
Nursing Transfer Note      6/12/2018     Transfer To: Room 1066    Transfer via bed    Transfer with cardiac monitoring    Transported by CECY Davis RN and Ms Hylton, PCT    Medicines sent: Yes    Chart send with patient: Yes    Notified: spouse, son    Patient reassessed at: 06/12/2018, 2050 by GUTIERREZ Hernandez     Upon arrival to floor: cardiac monitor applied, patient oriented to room, call bell in reach and bed in lowest position

## 2018-06-13 NOTE — PLAN OF CARE
Problem: Patient Care Overview  Goal: Plan of Care Review  Outcome: Ongoing (interventions implemented as appropriate)  POC reviewed with pt; questions adressed; no acute issues overnight. Please see flow sheets for assessments and vitals.  -A&O x 4  -[place]  -Flexi  -Tolerating PO

## 2018-06-13 NOTE — ASSESSMENT & PLAN NOTE
Nutrition Problem  Increased nutrient needs    Related to (etiology):   Physiological causes    Signs and Symptoms (as evidenced by):   Liver tx work-up    Nutrition Diagnosis Status:   New

## 2018-06-13 NOTE — SUBJECTIVE & OBJECTIVE
Past Medical History:   Diagnosis Date    Anticoagulant long-term use     Arthritis     Back pain     Cellulitis     Cirrhosis     Coronary artery disease     DM (diabetes mellitus)     Hearing loss     right ear    Hepatic encephalopathy     Hypertension     diagnosed today (11/25/2015) and prescribed toprol    Leg edema     Nephrolithiasis     JACK (obstructive sleep apnea)     Seizures     Splenomegaly      Past Surgical History:   Procedure Laterality Date    APPENDECTOMY      Open    BACK SURGERY      CHOLECYSTECTOMY      laprascopic    COLONOSCOPY N/A 1/25/2018    Procedure: COLONOSCOPY;  Surgeon: Lashawn Myles MD;  Location: [address]);  Service: Endoscopy;  Laterality: N/A;    LUMBAR DISCECTOMY      SPLENIC ARTERY EMBOLIZATION  04/08/2016    Dr Taveras    TONSILLECTOMY       Review of patient's allergies indicates:   Allergen Reactions    Nsaids (non-steroidal anti-inflammatory drug)      D/t to liver disease.    Tylenol [acetaminophen]      D/t liver disease.    Penicillins Other (See Comments)     Tolerated pip-tazo in 8/2016 without issue  Since childhood was told not to take it     Current Neurological Medications:   Keppra 1.5 g BID    No current facility-administered medications on file prior to encounter.      Current Outpatient Prescriptions on File Prior to Encounter   Medication Sig    ascorbic acid (VITAMIN C) 1000 MG tablet Take 1,000 mg by mouth once daily.    aspirin (ECOTRIN) 81 MG EC tablet Take 1 tablet (81 mg total) by mouth once daily.    atorvastatin (LIPITOR) 40 MG tablet TAKE 1 TABLET(40 MG) BY MOUTH EVERY DAY    docusate sodium (COLACE) 100 MG capsule Take 100 mg by mouth once daily.     ferrous gluconate 270 mg (27 mg iron) Tab Take 1 tablet by mouth once daily.    fish oil-omega-3 fatty acids 300-1,000 mg capsule Take 2 capsules (2 g total) by mouth once daily. (Patient taking differently: Take 2 g by mouth 2 (two) times daily. )     furosemide (LASIX) 80 MG tablet Take 1 tablet (80 mg total) by mouth 2 (two) times daily.    GENERLAC 10 gram/15 mL solution Take 60 mLs (40 g total) by mouth 3 (three) times daily. 60mg three times per day (Patient taking differently: Take 60 mLs by mouth 3 (three) times daily. )    levETIRAcetam (KEPPRA) 500 MG Tab Take 1 tablet (500 mg total) by mouth 2 (two) times daily.    magnesium oxide (MAG-OX) 400 mg tablet Take 1 tablet (400 mg total) by mouth once daily.    ondansetron (ZOFRAN) 4 MG tablet Take 1 tablet (4 mg total) by mouth every 8 (eight) hours as needed for Nausea.    pantoprazole (PROTONIX) 40 MG tablet Take 1 tablet (40 mg total) by mouth 2 (two) times daily before meals.    rifAXIMin (XIFAXAN) 550 mg Tab Take 1 tablet (550 mg total) by mouth 2 (two) times daily.    spironolactone (ALDACTONE) 100 MG tablet Take 2 tablets (200 mg total) by mouth 2 (two) times daily.    tramadol (ULTRAM) 50 mg tablet Take 50 mg by mouth every 6 (six) hours as needed for Pain.     Family History     None        Social History Main Topics    Smoking status: Never Smoker    Smokeless tobacco: Current User     Types: Chew    Alcohol use No    Drug use: No    Sexual activity: Not on file     Review of Systems   Neurological: Positive for tremors. Negative for speech difficulty, weakness, numbness and headaches.   Psychiatric/Behavioral: Positive for confusion.     Objective:     Vital Signs (Most Recent):  Temp: 98.4 °F (36.9 °C) (06/13/18 0725)  Pulse: 87 (06/13/18 1100)  Resp: 18 (06/13/18 0725)  BP: 132/69 (06/13/18 0725)  SpO2: 97 % (06/13/18 0725) Vital Signs (24h Range):  Temp:  [97.9 °F (36.6 °C)-98.4 °F (36.9 °C)] 98.4 °F (36.9 °C)  Pulse:  [] 87  Resp:  [16-25] 18  SpO2:  [97 %-100 %] 97 %  BP: (114-142)/(53-79) 132/69     Weight: (!) 138.2 kg (304 lb 10.8 oz)  Body mass index is 42.49 kg/m².    Physical Exam   Neurological: He has a normal Finger-Nose-Finger Test.   Reflex Scores:       Bicep  reflexes are 2+ on the right side and 2+ on the left side.       Brachioradialis reflexes are 2+ on the right side and 2+ on the left side.       Patellar reflexes are 2+ on the right side and 2+ on the left side.  Psychiatric: His speech is normal.     NEUROLOGICAL EXAMINATION:     MENTAL STATUS   Oriented to person.   Oriented to place.   Follows 2 step commands.   Speech: speech is normal   Level of consciousness: alert  Normal comprehension.        Oriented to family in room  Able to hold a conversation about his favorite sports teams     CRANIAL NERVES     CN III, IV, VI   Right pupil: Shape: regular.   Left pupil: Shape: regular.   Nystagmus: none   Ophthalmoparesis: none    CN V   Facial sensation intact.     CN VII   Facial expression full, symmetric.     CN VIII   Hearing: intact    CN XII   CN XII normal.     MOTOR EXAM   Muscle bulk: normal  Overall muscle tone: normal  Right arm pronator drift: absent  Left arm pronator drift: absent    Strength   Right deltoid: 4/5  Left deltoid: 4/5  Right biceps: 5/5  Left biceps: 5/5  Right triceps: 5/5  Left triceps: 5/5  Right interossei: 5/5  Left interossei: 5/5  Right iliopsoas: 5/5  Left iliopsoas: 5/5  Right anterior tibial: 5/5  Left anterior tibial: 5/5  Right posterior tibial: 5/5  Left posterior tibial: 5/5       +asterixis     REFLEXES     Reflexes   Right brachioradialis: 2+  Left brachioradialis: 2+  Right biceps: 2+  Left biceps: 2+  Right patellar: 2+  Left patellar: 2+    SENSORY EXAM   Light touch normal.     GAIT AND COORDINATION     Gait  Gait: (deferred)     Coordination   Finger to nose coordination: normal    Significant Labs:   Hemoglobin A1c:   Recent Labs  Lab 06/09/18  0056   HGBA1C 5.7*     Blood Culture: No results for input(s): LABBLOO in the last 48 hours.  CBC:   Recent Labs  Lab 06/12/18  0454 06/13/18  0856   WBC 2.60* 4.23   HGB 8.6* 9.4*   HCT 25.3* 27.3*   PLT 41* 49*     CMP:   Recent Labs  Lab 06/12/18  0454 06/13/18  0405   GLU  98  98 112*   *  146* 137   K 3.5  3.5 3.5   *  117* 111*   CO2 21*  21* 18*   BUN 29*  29* 26*   CREATININE 1.6*  1.6* 1.5*   CALCIUM 8.2*  8.2* 8.1*   MG 2.4 2.3   PROT 4.4*  4.4* 4.9*   ALBUMIN 1.9*  1.9* 1.9*   BILITOT 2.4*  2.4* 2.6*   ALKPHOS 72  72 81   AST 31  31 57*   ALT 19  19 27   ANIONGAP 8  8 8   EGFRNONAA 45.5*  45.5* 49.2*     Inflammatory Markers: No results for input(s): SEDRATE, CRP, PROCAL in the last 48 hours.  Urine Culture: No results for input(s): LABURIN in the last 48 hours.  Urine Studies: No results for input(s): COLORU, APPEARANCEUA, PHUR, SPECGRAV, PROTEINUA, GLUCUA, KETONESU, BILIRUBINUA, OCCULTUA, NITRITE, UROBILINOGEN, LEUKOCYTESUR, RBCUA, WBCUA, BACTERIA, SQUAMEPITHEL, HYALINECASTS in the last 48 hours.    Invalid input(s): WRIGHTSUR  All pertinent lab results from the past 24 hours have been reviewed.    EEG (17 hr 17 min) on 6/10/18:   Abnormal EEG due to moderate fluctuating encephalopathy with   intermittent sharp rhythmic triphasic GPEDs seen on and off for a period of up   to several minutes at a time with spontaneous resolution and without clear   seizures.  The frequency of triphasic runs does decrease towards the end of this   study as well.    Significant Imaging: I have reviewed and interpreted all pertinent imaging results/findings within the past 24 hours.

## 2018-06-13 NOTE — ASSESSMENT & PLAN NOTE
- baseline Cr 1.4-1.6  - restart home lasix and spironolactone with albumin  - monitor kidney function

## 2018-06-13 NOTE — PROGRESS NOTES
Admit Note     Met with patient, spouse and son to assess needs. Patient is a 63 y.o.  male, admitted for hepatic encephalopathy.  Pt also exhibited seizure like activity upon admission. Pt currently AAOx4, and much improved per pt and family.     Patient admitted from home on 6/9/2018 .  At this time, patient presents as alert and oriented x 4, pleasant, good eye contact, recall good, concentration/judgement good, average intelligence, calm, communicative, cooperative and asking and answering questions appropriately.  At this time, patients caregiver presents as alert and oriented x 4, pleasant, good eye contact, well groomed, recall good, concentration/judgement good, average intelligence, calm, communicative, cooperative and asking and answering questions appropriately.    Household/Family Systems     Patient resides with patient's wife and son, at [address] [facility] [place] [address].  Support system includes pt's wife Vin, son London and mother Dary who plans on staying with pt upon discharge. Pt's mother flew in from [place] to be with pt due to his decompensation. Pt's mother is 86 y/o and will not be primary caregiver.  Patient does have dependents that are in need of being cared for. Patient's son is being cared for by pt's mother.     Patients primary caregiver is Vin Adamson, patients wife, phone number [phone].  Confirmed patients contact information is [phone] (home);   Telephone Information:   Mobile [phone]     During admission, patient's caregiver plans to stay at home. Pt's mother will stay in the [address] while wife and son return home. Wife plans on returning to the hospital on Friday morning unless pt discharged before then. Confirmed patient and patients caregivers do have access to reliable transportation.    Cognitive Status/Learning     Patient reports reading ability as 12th grade and states patient does have difficulty with hearing. 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 Pt can hear clearly out of his left ear. Pt also recently had confusion due to hepatic encephalopathy, beginning last week due to possible missed dose of Lactulose while pt's wife and son were caring for pt's MIL. Pt and family report that pt is typically compliant with his medication but may not have taken the full dose by accident. Pt states that Lactulose does not bother him.  Patient reports patient learns best by written and verbal information.       Needed: No.   Highest education level: High School (9-12) or GED    Vocation/Disability     Working for Income: No  If no, reason not working: Patient Choice - Retired  Patient is retired from being an  for the [place] [employer] since 1988. Prior to this, pt served in the [employer] from [employer]89-0567. Pt retired in 2016 when he became sick with ESLD.    Adherence     Patient reports a high level of adherence to patients health care regimen.  Adherence counseling and education provided. Patient verbalizes understanding.    Substance Use    Patient reports the following substance usage.    Tobacco: Pt chews a very small amount of tobacco, once every four weeks. Pt committed to abstaining from tobacco altogether. Pt was down to one pack every few weeks when SW last visited in Jan 2018. Pt has made significant progress since then.  Alcohol: none, patient denies any use. Pt reports last ETOH use was 32 years ago when he met his wife. Pt states that prior to this, he drank heavily while serving in the [employer], primarily drinking beer.   Illicit Drugs/Non-prescribed Medications: none, patient denies any use. Pt reports remote history of marijuana use in the 70's.   Patient states clear understanding of the potential impact of substance use.  Substance abstinence/cessation counseling, education and resources provided and reviewed.     Services Utilizing/ADLS    Infusion Service: Prior to admission,  patient utilizing? no  Home Health: Prior to admission, patient utilizing? no  DME: Prior to admission, yes; rolling walker, candelaria, w/c; pt has a CPAP but does not need anymore as assessed by sleep study pt received 2.5 years ago.   Pulmonary/Cardiac Rehab: Prior to admission, no  Dialysis:  Prior to admission, no  Transplant Specialty Pharmacy:  Prior to admission, no.    Prior to admission, patient reports that prior to becoming encephalopathic, patient was independent with ADLS and was not driving due to past hx of encephalopathy in Aug 2016.  Patient reports patient is not able to care for self at this time due to compromised medical condition (as documented in medical record) and physical weakness..  Patient indicates a willingness to care for self once medically cleared to do so.    Insurance/Medications    Insured by   Payor/Plan Subscr  Sex Relation Sub. Ins. ID Effective Group Num   1. BLUE CROSS OF* IVETTE SILVERIO* 1955 Male  [id number]                                   [address] [address] [place] [place] [address]   2. [place]ICARE - [place]* IVETTE SILVERIO* 1955 Male  [id number]                                    [address]      Primary Insurance (for UNOS reporting): Public Insurance - Medicare & Choice  Secondary Insurance (for UNOS reporting): Private Insurance-BCBS     *Pt's wife still shopping for pt's Part D pharmacy benefit and will get in touch with [facility]  Kristel Hernandez.     Patient reports patient is able to obtain and afford medications at this time and at time of discharge.    Living Will/Healthcare Power of     Patient states patient does not have a LW and/or HCPA.   provided education regarding LW and HCPA and the completion of forms.    Coping/Mental Health    Patient is coping adequately with the aid of  family members. Patient denies mental health difficulties.     Discharge Planning    At time of discharge,  patient plans to return to patient's home under the care of pt's wife.  Patients wife will transport patient.  Per rounds today, expected discharge date has not been medically determined at this time. Patient and patients caregiver  verbalize understanding and are involved in treatment planning and discharge process.    Additional Concerns    Patient is being followed for needs, education, resources, information, emotional support, supportive counseling, and for supportive and skilled discharge plan of care.  providing ongoing psychosocial support, education, resources and d/c planning as needed.  SW remains available.  provided resource list, patient choice, psychosocial and supportive counseling, resources, education, assistance and discharge planning with patient and caregiver involvement, ongoing SW availability and services as appropriate.

## 2018-06-13 NOTE — HPI
HPI:  Mr. Adamson is a 62-year-old man with CAD, seizure disorder on Keppra, DMII, and ETOH cirrhosis with recurrent hepatic encephalopathy, recently placed on the transplant list who presents as a transfer from [facility] where he presented with altered mental status and was diagnosed with hepatic encephalopathy. He was transferred to [facility] under critical care service for further acute care and hepatology evaluation. Per records, son found Mr. Adamson confused at home, reported patient's wife helps manage medication but she was out of town, so unsure if patient was compliant with medications on his own. Infectious work up initiated on admission, blood and urine cultures with NGTD. Head CT was done at previous hospital, report showed no acute process. He was emergently intubated at previous facility before transfer to [facility]. Diagnostic para completed 6/10 showed no SBP, WBC 69, 22%. EEG 6/10 showed intermittent continuous generalized epileptiform discharges so patient was loaded with keppra + BID dosing. Extubated 6/11.

## 2018-06-13 NOTE — PT/OT/SLP PROGRESS
Physical Therapy Treatment    Patient Name:  Alon Adamson   MRN:  [id number]    Recommendations:     Discharge Recommendations:  home with home health   Discharge Equipment Recommendations:  (TBD)   Barriers to discharge: Decreased caregiver support    Assessment:     Alon Adamson is a 63 y.o. male admitted with a medical diagnosis of Hepatic encephalopathy.  He presents with the following impairments/functional limitations:  weakness, impaired endurance, edema, impaired skin, gait instability, decreased lower extremity function, impaired balance, impaired self care skills, impaired functional mobilty Patient tolerated treatment  well. Patient limited at this time by increased A with scooting/sit to stand. Pt unable to come to full stand with max A and RW x 2 trials. Pt was able to scoot laterally to R x 6 with CGA.  Patient will continue to require skilled PT services to address the above impairments to return to prior level of function as independent as possible. Discharge recommendation home with home health PT in order to maximize mobility, decrease caregiver burden and increase  functional independence while in the home setting.    .    Rehab Prognosis:  good; patient would benefit from acute skilled PT services to address these deficits and reach maximum level of function.      Recent Surgery: * No surgery found *      Plan:     During this hospitalization, patient to be seen 4 x/week to address the above listed problems via gait training, therapeutic activities, therapeutic exercises, neuromuscular re-education  · Plan of Care Expires:  07/11/18   Plan of Care Reviewed with: patient    Subjective     Communicated with RN prior to session.  Patient found supine with mom  upon PT entry to room, agreeable to treatment.      Chief Complaint: pt is concerned that his bowel mgmt system might leak with activity  Patient comments/goals: pt asks if he did good today  Pain/Comfort:  · Pain Rating 1:  0/10  · Pain Rating Post-Intervention 1: 0/10    Patients cultural, spiritual, Pentecostal conflicts given the current situation: none stated    Objective:     Patient found with:  (bowel mgmt system; peripheral IV; telemtry, pules ox ( cont))     General Precautions: Standard, fall   Orthopedic Precautions:N/A   Braces: N/A     Functional Mobility:  · Bed Mobility:  Rolling Left:  moderate assistance  · Scooting: contact guard assistance   · Supine to Sit: moderate assistance for BLE assist  · Sit to Supine: moderate assistance for BLE assist   · Transfers:  Sit to Stand:  maximal assistance with no AD  · Balance: dynamic sitting balance CGA no increaes in sway or LOB      AM-PAC 6 CLICK MOBILITY  Turning over in bed (including adjusting bedclothes, sheets and blankets)?: 2  Sitting down on and standing up from a chair with arms (e.g., wheelchair, bedside commode, etc.): 2  Moving from lying on back to sitting on the side of the bed?: 3  Moving to and from a bed to a chair (including a wheelchair)?: 1  Need to walk in hospital room?: 1  Climbing 3-5 steps with a railing?: 1  Total Score: 10       Therapeutic Activities and Exercises:  Sitting EOB x 15 minutes with CGA using BUE for support, no increased in sway or LOB  Scooting to EOB x 5 trial with CGA, v/c for WS   Scooting to HOB ( R ) with min A to block BLE, v/c for increased fwd lean  Sit to stand x 2 trials : trial 1 with support at axilla and hips with no AD; unsuccessful   Trial 2 same A facilitation  with RW also unsuccessful      Patient education  · Patient educated on the role of PT and POC  · Whiteboard updated in patients room to current assistance level  · All of patients questions were answered within the scope of PT  · Patient educated on importance of out of bed activity while in the hosptial per tolerance  · Patient educated on safe transfers with nursing as appropriate  · Pt educated on WS in bed for pressure relief      Patient left HOB  elevated with all lines intact, call button in reach and RN notified..    GOALS:    Physical Therapy Goals        Problem: Physical Therapy Goal    Goal Priority Disciplines Outcome Goal Variances Interventions   Physical Therapy Goal     PT/OT, PT Ongoing (interventions implemented as appropriate)     Description:  Goals to be met by: 2018    Patient will increase functional independence with mobility by performin. Supine to sit with Supervision - not met  2. Sit to stand transfer with Supervision using LRAD or no AD - not met  3. Bed to chair transfer with Supervision using LRAD or no AD - not met  4. Gait  x 150 feet with Supervision using LRAD - not met  5. Ascend/descend 5 stair with right Handrails Supervision - not met                       Time Tracking:     PT Received On: 18  PT Start Time: 1450     PT Stop Time: 1520  PT Total Time (min): 30 min     Billable Minutes: Therapeutic Activity 30 min    Treatment Type: Treatment  PT/PTA: PT           Rl Mora, PT  2018

## 2018-06-13 NOTE — HPI
63 y.o. Male with DMII, CAD, ETOH cirrhosis and recurrent hepatic encephalopathy on transplant list and seizure disorder was transferred from [facility] 6/9/18 after initially presenting with AMS after son found him confused at his home on 6/8/18. At OSH, he was diagnosed with hepatic encephalopathy. Per chart review, his wife normally helps with medications, but she was out of town and there is concern for medication noncompliance.  At OSH, he underwent CT head which was read as normal. Diagnostic paracentesis was negative for SBP. He was emergently intubated for airway protection and transferred to [facility] hepatology evaluation. Currently listed for transplant and receiving lactulose and rifaximin. EEG 6/10 (17 hr) showed intermittent sharp rhythmic triphasic GPEDS seen on and off for several minutes with spontaneous resolution and without clear seizures. Frequent triphasic runs decreased at end of study. His Keppra was increased from 500 mg BID to 1.5 g BID. He was extubated 6/11 and stepped down to the floor. Neurology consulted 6/13 for AED recommendations.

## 2018-06-13 NOTE — ASSESSMENT & PLAN NOTE
- ESLD 2/2 ETOH cirrhosis, listed for liver transplant MELD 23, on internal hold  - PT/OT ordered for weakness  - PETH pending 6/11    MELD-Na score: 20 at 6/13/2018  4:05 AM  MELD score: 20 at 6/13/2018  4:05 AM  Calculated from:  Serum Creatinine: 1.5 mg/dL at 6/13/2018  4:05 AM  Serum Sodium: 137 mmol/L at 6/13/2018  4:05 AM  Total Bilirubin: 2.6 mg/dL at 6/13/2018  4:05 AM  INR(ratio): 1.7 at 6/13/2018  4:05 AM  Age: 63 years

## 2018-06-13 NOTE — ASSESSMENT & PLAN NOTE
- restart home spironolactone 200mg BID  - start lasix 80mg IV BID with albumin x 2 doses today  - low Na diet

## 2018-06-13 NOTE — CONSULTS
"  [facility]  Adult Nutrition  Consult Note    SUMMARY     Recommendations    1. Add low sodium diet restrictions to current regular diet order.   2. Add Boost Plus ONS if PO intake consistently <50%.   3. RD to monitor & follow-up.    Goals: PO intake >50%  Nutrition Goal Status: new  Communication of RD Recs: reviewed with RN    Reason for Assessment    Reason for Assessment: consult  Diagnosis: other (see comments) (Hepatic encephalopathy)  Relevant Medical History: DM, Etoh abuse, Cirrhosis, HTN  Interdisciplinary Rounds: did not attend    General Information Comments: Pt w/ fair appetite, however doesnt like the hospital food. Reports good appetite & stable wt PTA.   Nutrition Discharge Planning: Adequate PO intake    Nutrition/Diet History    Patient Reported Diet/Restrictions/Preferences: low salt  Food Preferences: No coffee, no tea, likes Jell-O  Do you have any cultural, spiritual, Mosque conflicts, given your current situation?: none reported   Factors Affecting Nutritional Intake: decreased appetite, other (see comments) (Doesnt like hospital food)    Anthropometrics    Temp: 98.4 °F (36.9 °C)  Height Method: Estimated  Height: 5' 11" (180.3 cm)  Height (inches): 71 in  Weight Method: Bed Scale  Weight: (!) 138.2 kg (304 lb 10.8 oz)  Weight (lb): (!) 304.68 lb  Ideal Body Weight (IBW), Male: 172 lb  % Ideal Body Weight, Male (lb): 177.14 lb  BMI (Calculated): 42.6  BMI Grade: greater than 40 - morbid obesity    Lab/Procedures/Meds    Pertinent Labs Reviewed: reviewed  Pertinent Labs Comments: BUN 26, Creat 1.5, GFR 49.2, Gluc 112, Bili 2.6, A1C 5.7  Pertinent Medications Reviewed: reviewed    Physical Findings/Assessment    Overall Physical Appearance: obese  Oral/Mouth Cavity: tooth/teeth missing  Skin: intact    Estimated/Assessed Needs    Weight Used For Calorie Calculations: (!) 138.2 kg (304 lb 10.8 oz)     Energy Calorie Requirements (kcal): 2204 kcal/d  Energy Need Method: " [place] (1.0 PAL)     Protein Requirements: 138 g/d (1 g/kg)  Weight Used For Protein Calculations: (!) 138.2 kg (304 lb 10.8 oz)     Fluid Need Method: other (see comments) (Per MD or 1 mL/kcal)    Nutrition Prescription Ordered    Current Diet Order: Regular    Nutrition Risk    Level of Risk/Frequency of Follow-up: moderate     Assessment and Plan    * Hepatic encephalopathy      Nutrition Problem  Increased nutrient needs    Related to (etiology):   Physiological causes    Signs and Symptoms (as evidenced by):   Liver tx work-up    Nutrition Diagnosis Status:   New         Monitor and Evaluation    Food and Nutrient Intake: energy intake, food and beverage intake  Food and Nutrient Adminstration: diet order  Physical Activity and Function: nutrition-related ADLs and IADLs  Anthropometric Measurements: weight, weight change  Biochemical Data, Medical Tests and Procedures: lipid profile, inflammatory profile, glucose/endocrine profile, gastrointestinal profile, electrolyte and renal panel  Nutrition-Focused Physical Findings: overall appearance     Nutrition Follow-Up    RD Follow-up?: Yes

## 2018-06-14 ENCOUNTER — TELEPHONE (OUTPATIENT)
Dept: TRANSPLANT | Facility: CLINIC | Age: 63
End: 2018-06-14

## 2018-06-14 PROBLEM — R94.01 ELECTROENCEPHALOGRAM (EEG) ABNORMALITY WITHOUT SEIZURE: Status: ACTIVE | Noted: 2018-06-14

## 2018-06-14 PROBLEM — L01.00 IMPETIGO: Status: RESOLVED | Noted: 2018-01-21 | Resolved: 2018-06-14

## 2018-06-14 LAB
ALBUMIN SERPL BCP-MCNC: 2.3 G/DL
ALP SERPL-CCNC: 72 U/L
ALT SERPL W/O P-5'-P-CCNC: 24 U/L
ANION GAP SERPL CALC-SCNC: 7 MMOL/L
ANISOCYTOSIS BLD QL SMEAR: SLIGHT
AST SERPL-CCNC: 36 U/L
BACTERIA BLD CULT: NORMAL
BACTERIA BLD CULT: NORMAL
BASOPHILS # BLD AUTO: 0.02 K/UL
BASOPHILS NFR BLD: 0.8 %
BILIRUB SERPL-MCNC: 2.8 MG/DL
BUN SERPL-MCNC: 21 MG/DL
CALCIUM SERPL-MCNC: 8.1 MG/DL
CHLORIDE SERPL-SCNC: 107 MMOL/L
CO2 SERPL-SCNC: 19 MMOL/L
CREAT SERPL-MCNC: 1.5 MG/DL
DIFFERENTIAL METHOD: ABNORMAL
EOSINOPHIL # BLD AUTO: 0.3 K/UL
EOSINOPHIL NFR BLD: 9.4 %
ERYTHROCYTE [DISTWIDTH] IN BLOOD BY AUTOMATED COUNT: 17.2 %
EST. GFR  (AFRICAN AMERICAN): 56.9 ML/MIN/1.73 M^2
EST. GFR  (NON AFRICAN AMERICAN): 49.2 ML/MIN/1.73 M^2
GLUCOSE SERPL-MCNC: 120 MG/DL
HCT VFR BLD AUTO: 25 %
HGB BLD-MCNC: 8.5 G/DL
HYPOCHROMIA BLD QL SMEAR: ABNORMAL
IMM GRANULOCYTES # BLD AUTO: 0.01 K/UL
IMM GRANULOCYTES NFR BLD AUTO: 0.4 %
INR PPP: 1.7
LYMPHOCYTES # BLD AUTO: 0.5 K/UL
LYMPHOCYTES NFR BLD: 18.4 %
MAGNESIUM SERPL-MCNC: 1.8 MG/DL
MCH RBC QN AUTO: 33.1 PG
MCHC RBC AUTO-ENTMCNC: 34 G/DL
MCV RBC AUTO: 97 FL
MONOCYTES # BLD AUTO: 0.6 K/UL
MONOCYTES NFR BLD: 21.4 %
NEUTROPHILS # BLD AUTO: 1.3 K/UL
NEUTROPHILS NFR BLD: 49.6 %
NRBC BLD-RTO: 0 /100 WBC
OVALOCYTES BLD QL SMEAR: ABNORMAL
PHOSPHATE SERPL-MCNC: 2.4 MG/DL
PLATELET # BLD AUTO: 38 K/UL
PMV BLD AUTO: 12.2 FL
POIKILOCYTOSIS BLD QL SMEAR: SLIGHT
POLYCHROMASIA BLD QL SMEAR: ABNORMAL
POTASSIUM SERPL-SCNC: 3.4 MMOL/L
PROT SERPL-MCNC: 4.8 G/DL
PROTHROMBIN TIME: 16.4 SEC
RBC # BLD AUTO: 2.57 M/UL
SODIUM SERPL-SCNC: 133 MMOL/L
WBC # BLD AUTO: 2.66 K/UL

## 2018-06-14 PROCEDURE — 99233 SBSQ HOSP IP/OBS HIGH 50: CPT | Mod: NTX,,, | Performed by: NURSE PRACTITIONER

## 2018-06-14 PROCEDURE — 80053 COMPREHEN METABOLIC PANEL: CPT | Mod: NTX

## 2018-06-14 PROCEDURE — 25000003 PHARM REV CODE 250: Mod: NTX | Performed by: PHYSICIAN ASSISTANT

## 2018-06-14 PROCEDURE — 36415 COLL VENOUS BLD VENIPUNCTURE: CPT | Mod: NTX

## 2018-06-14 PROCEDURE — G8988 SELF CARE GOAL STATUS: HCPCS | Mod: CJ,NTX

## 2018-06-14 PROCEDURE — 84100 ASSAY OF PHOSPHORUS: CPT | Mod: NTX

## 2018-06-14 PROCEDURE — 25000003 PHARM REV CODE 250: Mod: NTX | Performed by: NURSE PRACTITIONER

## 2018-06-14 PROCEDURE — 85610 PROTHROMBIN TIME: CPT | Mod: NTX

## 2018-06-14 PROCEDURE — 83735 ASSAY OF MAGNESIUM: CPT | Mod: NTX

## 2018-06-14 PROCEDURE — 85025 COMPLETE CBC W/AUTO DIFF WBC: CPT | Mod: NTX

## 2018-06-14 PROCEDURE — 20600001 HC STEP DOWN PRIVATE ROOM: Mod: NTX

## 2018-06-14 PROCEDURE — 82239 BILE ACIDS TOTAL: CPT | Mod: NTX

## 2018-06-14 PROCEDURE — 63600175 PHARM REV CODE 636 W HCPCS: Mod: NTX | Performed by: NURSE PRACTITIONER

## 2018-06-14 PROCEDURE — 25000003 PHARM REV CODE 250: Mod: NTX | Performed by: STUDENT IN AN ORGANIZED HEALTH CARE EDUCATION/TRAINING PROGRAM

## 2018-06-14 PROCEDURE — G8987 SELF CARE CURRENT STATUS: HCPCS | Mod: CK,NTX

## 2018-06-14 PROCEDURE — 97530 THERAPEUTIC ACTIVITIES: CPT | Mod: NTX

## 2018-06-14 PROCEDURE — 97116 GAIT TRAINING THERAPY: CPT | Mod: NTX

## 2018-06-14 PROCEDURE — 63600175 PHARM REV CODE 636 W HCPCS: Mod: JG,NTX | Performed by: NURSE PRACTITIONER

## 2018-06-14 PROCEDURE — P9047 ALBUMIN (HUMAN), 25%, 50ML: HCPCS | Mod: JG,NTX | Performed by: NURSE PRACTITIONER

## 2018-06-14 PROCEDURE — 97535 SELF CARE MNGMENT TRAINING: CPT | Mod: NTX

## 2018-06-14 RX ORDER — FUROSEMIDE 10 MG/ML
60 INJECTION INTRAMUSCULAR; INTRAVENOUS 2 TIMES DAILY
Status: DISCONTINUED | OUTPATIENT
Start: 2018-06-14 | End: 2018-06-15

## 2018-06-14 RX ORDER — ALBUMIN HUMAN 250 G/1000ML
25 SOLUTION INTRAVENOUS EVERY 8 HOURS
Status: COMPLETED | OUTPATIENT
Start: 2018-06-14 | End: 2018-06-14

## 2018-06-14 RX ORDER — POTASSIUM CHLORIDE 750 MG/1
20 CAPSULE, EXTENDED RELEASE ORAL ONCE
Status: COMPLETED | OUTPATIENT
Start: 2018-06-14 | End: 2018-06-14

## 2018-06-14 RX ADMIN — HEPARIN SODIUM 5000 UNITS: 5000 INJECTION, SOLUTION INTRAVENOUS; SUBCUTANEOUS at 02:06

## 2018-06-14 RX ADMIN — SPIRONOLACTONE 200 MG: 100 TABLET, FILM COATED ORAL at 08:06

## 2018-06-14 RX ADMIN — LACTULOSE 30 G: 20 SOLUTION ORAL at 06:06

## 2018-06-14 RX ADMIN — MICONAZOLE NITRATE: 20 POWDER TOPICAL at 10:06

## 2018-06-14 RX ADMIN — CHOLESTYRAMINE 4 G: 4 POWDER, FOR SUSPENSION ORAL at 08:06

## 2018-06-14 RX ADMIN — ALBUMIN HUMAN 25 G: 0.25 SOLUTION INTRAVENOUS at 10:06

## 2018-06-14 RX ADMIN — SODIUM BICARBONATE 650 MG TABLET 650 MG: at 10:06

## 2018-06-14 RX ADMIN — PANTOPRAZOLE SODIUM 40 MG: 40 TABLET, DELAYED RELEASE ORAL at 04:06

## 2018-06-14 RX ADMIN — PANTOPRAZOLE SODIUM 40 MG: 40 TABLET, DELAYED RELEASE ORAL at 05:06

## 2018-06-14 RX ADMIN — FUROSEMIDE 60 MG: 10 INJECTION, SOLUTION INTRAMUSCULAR; INTRAVENOUS at 06:06

## 2018-06-14 RX ADMIN — POTASSIUM CHLORIDE 20 MEQ: 750 CAPSULE, EXTENDED RELEASE ORAL at 07:06

## 2018-06-14 RX ADMIN — SPIRONOLACTONE 200 MG: 100 TABLET, FILM COATED ORAL at 10:06

## 2018-06-14 RX ADMIN — CHOLESTYRAMINE 4 G: 4 POWDER, FOR SUSPENSION ORAL at 10:06

## 2018-06-14 RX ADMIN — LEVETIRACETAM 1500 MG: 750 TABLET ORAL at 08:06

## 2018-06-14 RX ADMIN — POTASSIUM CHLORIDE 20 MEQ: 750 CAPSULE, EXTENDED RELEASE ORAL at 11:06

## 2018-06-14 RX ADMIN — LACTULOSE 30 G: 20 SOLUTION ORAL at 05:06

## 2018-06-14 RX ADMIN — RIFAXIMIN 550 MG: 550 TABLET ORAL at 08:06

## 2018-06-14 RX ADMIN — LACTULOSE 30 G: 20 SOLUTION ORAL at 11:06

## 2018-06-14 RX ADMIN — MICONAZOLE NITRATE: 20 POWDER TOPICAL at 08:06

## 2018-06-14 RX ADMIN — LEVETIRACETAM 1500 MG: 750 TABLET ORAL at 10:06

## 2018-06-14 RX ADMIN — FUROSEMIDE 80 MG: 10 INJECTION, SOLUTION INTRAMUSCULAR; INTRAVENOUS at 08:06

## 2018-06-14 RX ADMIN — RIFAXIMIN 550 MG: 550 TABLET ORAL at 10:06

## 2018-06-14 RX ADMIN — ALBUMIN HUMAN 25 G: 0.25 SOLUTION INTRAVENOUS at 02:06

## 2018-06-14 RX ADMIN — SODIUM BICARBONATE 650 MG TABLET 650 MG: at 08:06

## 2018-06-14 NOTE — ASSESSMENT & PLAN NOTE
- restarted home Spironolactone 200mg BID  - repeat Lasix 80mg IV BID with albumin x 2 doses today  - low Na diet

## 2018-06-14 NOTE — ASSESSMENT & PLAN NOTE
- c/o significant itching  - check bile acids in AM- results pending  - continue hydroxyzine, benadryl cream, and cholestyramine

## 2018-06-14 NOTE — PHYSICIAN QUERY
PT Name: Alon Adamson  MR #: [id number]    Physician Query Form - Nutrition Clarification     CDS/: Forrest BOYD,RN,CDI            Contact information:[phone]  This form is a permanent document in the medical record.     Query Date: June 14, 2018    By submitting this query, we are merely seeking further clarification of documentation.. Please utilize your independent clinical judgment when addressing the question(s) below.    The Medical record contains the following:   Indicators  Supporting Clinical Findings Location in Medical Record    % of Estimated Energy Intake over a time frame from p.o., TF, or TPN      Weight Status over a time frame      Subcutaneous Fat and/or Muscle Loss      Fluid Accumulation or Edema      Reduced  Strength     x Wt / BMI / Usual Body Weight     OT=184.68lb      HT= 5'11      BMI=42.6                  06/13/18 Adult Nutrition Consult Note    Delayed Wound Healing / Failure to Thrive     x Acute or Chronic Illness    Hepatic encephalopathy, DM, Etoh abuse,     Cirrhosis, HTN         Hypoalbuminemia due to protein-calorie           malnutrition           06/13/18 Adult Nutrition Consult Note              06/13/18 Transplant Liver Progress Note    Medication     x Treatment     Add low sodium diet restrictions to      current regular diet order.       Add Boost Plus ONS if PO intake      consistently <50%.                 06/13/18 Adult Nutrition Consult Note    Other       AND / ASPEN Clinical Characteristics (October 2011)  A minimum of two characteristics is recommended for diagnosing either moderate or severe malnutrition   Mild Malnutrition Moderate Malnutrition Severe Malnutrition   Energy Intake from p.o., TF or TPN. < 75% intake of estimated energy needs for less than 7 days < 75% intake of estimated energy needs for greater than 7 days < 50% intake of estimated energy needs for > 5 days   Weight Loss 1-2% in 1 month  5% in 3 months  7.5% in 6  months  10% in 1 year 1-2 % in 1 week  5% in 1 month  7.5% in 3 months  10% in 6 months  20% in 1 year > 2% in 1 week  > 5% in 1 month  > 7.5% in 3 months  > 10% in 6 months  > 20% in 1 year   Physical Findings     None *Mild subcutaneous fat and/or muscle loss  *Mild fluid accumulation  *Stage II decubitus  *Surgical wound or non-healing wound *Mod/severe subcutaneous fat and/or muscle loss  *Mod/severe fluid accumulation  *Stage III or IV decubitus  *Non-healing surgical wound     Provider, please specify diagnosis or diagnoses associated with above clinical findings.    [ ] Mild Protein-Calorie Malnutrition    [x ] Moderate Protein-Calorie Malnutrition     [ ] Other Nutritional Diagnosis (please specify): ____________________________________    [ ] Clinically Undetermined    Please document in your progress notes daily for the duration of treatment until resolved and include in your discharge summary.

## 2018-06-14 NOTE — NURSING
Lab called RN to report Platelet value of 38 this A.M. RN notified Dr. Gómez as well as on-call MD for hepatology. Both are aware.

## 2018-06-14 NOTE — PLAN OF CARE
Problem: Patient Care Overview  Goal: Plan of Care Review  Outcome: Ongoing (interventions implemented as appropriate)  Pt. A&Ox4. Bedrest. Rectal tube removed per MD order. Pt. Has had 3 large BMs during night. Generalized weakness noted. DWIGHT LE edema, skin dry with dark skin pigmentation to DWIGHT LE. Pt was encouraged and educated on importance of Q2 turning, but pt. Refused and non-compliant. Scabs on face present as well. Vitals stable throughout night. SR on tele. Will cont. To monitor.

## 2018-06-14 NOTE — SUBJECTIVE & OBJECTIVE
Scheduled Meds:   albumin human 25%  25 g Intravenous Q8H    cholestyramine  1 packet Oral BID    furosemide  60 mg Intravenous BID    heparin (porcine)  5,000 Units Subcutaneous Q8H    lactulose  30 g Oral Q6H    levETIRAcetam  1,500 mg Oral BID    miconazole NITRATE 2 %   Topical (Top) BID    pantoprazole  40 mg Oral BID AC    rifAXImin  550 mg Oral BID    sodium bicarbonate  650 mg Oral BID    spironolactone  200 mg Oral BID     Continuous Infusions:  PRN Meds:dextrose 50%, dextrose 50%, diphenhydrAMINE-zinc acetate 1-0.1%, glucagon (human recombinant), glucose, glucose, hydrOXYzine HCl, insulin aspart U-100, sodium chloride 0.9%    Review of Systems   Constitutional: Positive for appetite change. Negative for chills, fatigue and fever.   Respiratory: Negative for cough, chest tightness and shortness of breath.    Cardiovascular: Positive for leg swelling. Negative for chest pain and palpitations.   Gastrointestinal: Positive for abdominal distention and diarrhea (on lactulose). Negative for abdominal pain, constipation, nausea and vomiting.   Genitourinary: Negative for difficulty urinating, dysuria, frequency and urgency.   Musculoskeletal: Negative for back pain and neck pain.   Skin: Negative for color change, pallor, rash and wound.        pruritis   Neurological: Positive for weakness. Negative for dizziness and headaches.   Psychiatric/Behavioral: Negative for confusion and sleep disturbance.     Objective:     Vital Signs (Most Recent):  Temp: 98.7 °F (37.1 °C) (06/14/18 1100)  Pulse: 94 (06/14/18 1221)  Resp: 18 (06/14/18 1100)  BP: 122/76 (06/14/18 1100)  SpO2: 98 % (06/14/18 1100) Vital Signs (24h Range):  Temp:  [98 °F (36.7 °C)-98.7 °F (37.1 °C)] 98.7 °F (37.1 °C)  Pulse:  [78-94] 94  Resp:  [18] 18  SpO2:  [95 %-98 %] 98 %  BP: (117-128)/(55-76) 122/76     Weight: (!) 138.2 kg (304 lb 10.8 oz)  Body mass index is 42.49 kg/m².    Intake/Output - Last 3 Shifts       06/12 0700 - 06/13 0687  06/13 0700 - 06/14 0659 06/14 0700 - 06/15 0659    P.O. 1800      I.V. (mL/kg) 160 (1.2)      IV Piggyback 200      Total Intake(mL/kg) 2160 (15.6)      Urine (mL/kg/hr) 935 (0.3) 1425 (0.4)     Emesis/NG output  0 (0)     Stool 800 (0.2) 0 (0)     Blood  0 (0)     Total Output 1735 1425      Net +425 -1425             Urine Occurrence  2 x     Stool Occurrence 2 x 2 x     Emesis Occurrence  0 x           Physical Exam   Constitutional: He is oriented to person, place, and time. He appears well-nourished. He is cooperative.   Eyes: No scleral icterus.   Cardiovascular: Normal rate, regular rhythm, normal heart sounds, intact distal pulses and normal pulses.    No murmur heard.  Pulmonary/Chest: Effort normal. No respiratory distress. He has decreased breath sounds in the right lower field and the left lower field. He has no wheezes. He has no rales.   Abdominal: Soft. Bowel sounds are normal. He exhibits distension. He exhibits no ascites. There is no tenderness. There is no rebound and no guarding.   Musculoskeletal: Normal range of motion. He exhibits edema (+3/+4 edema). He exhibits no tenderness.   Neurological: He is alert and oriented to person, place, and time. He has normal strength.   Skin: Skin is warm, dry and intact. No rash noted. No erythema. There is pallor.   BLE distal erythema with significant flaking and crusts   Psychiatric: He has a normal mood and affect. His speech is normal and behavior is normal.   Nursing note and vitals reviewed.      Laboratory:  Immunosuppressants     None        CBC:     Recent Labs  Lab 06/14/18 0349   WBC 2.66*   RBC 2.57*   HGB 8.5*   HCT 25.0*   PLT 38*   MCV 97   MCH 33.1*   MCHC 34.0     CMP:     Recent Labs  Lab 06/14/18 0349   *   CALCIUM 8.1*   ALBUMIN 2.3*   PROT 4.8*   *   K 3.4*   CO2 19*      BUN 21   CREATININE 1.5*   ALKPHOS 72   ALT 24   AST 36   BILITOT 2.8*     Coagulation:   Recent Labs  Lab 06/09/18  0056  06/14/18  0349   INR  1.8*  < > 1.7*   APTT 25.9  --   --    < > = values in this interval not displayed.    Labs within the past 24 hours have been reviewed.    Diagnostic Results:  I have personally reviewed all pertinent imaging studies.

## 2018-06-14 NOTE — PLAN OF CARE
Problem: Occupational Therapy Goal  Goal: Occupational Therapy Goal  Goals to be met by: 6/26/18     Patient will increase functional independence with ADLs by performing:    Feeding with Modified [place].  UE Dressing with Supervision.  LE Dressing with Supervision.  Grooming while standing at sink with Supervision.  Toileting from toilet with Supervision for hygiene and clothing management.  -- Met (6/14)  Toilet transfer to toilet with Supervision.     Outcome: Ongoing (interventions implemented as appropriate)  Pt progressing w/ therapy towards functional outcomes    Comments: Cont OT POC

## 2018-06-14 NOTE — ASSESSMENT & PLAN NOTE
- h/o seizures on keppra 500mg BID  - admit with AMS, EEG 6/10 with encephalopathy  - neuro consulted for EEG findings, no seizures seen on EEG, rec increase keppra to 1500mg BID

## 2018-06-14 NOTE — ASSESSMENT & PLAN NOTE
- acute on chronic  - infectious work up initiated on admission, blood and urine cultures 6/9 NGTD, off all antibiotics  - diagnostic para 6/10, no SBP  - aaox3, continue lactulose and rifaximin

## 2018-06-14 NOTE — PT/OT/SLP PROGRESS
Occupational Therapy   Treatment    Name: Alon Adamson  MRN: [id number]  Admitting Diagnosis:  Hepatic encephalopathy       Recommendations:     Discharge Recommendations: home with home health  Discharge Equipment Recommendations:   (TBD)  Barriers to discharge:  None    Subjective     Communicated with: RN prior to session.  Pain/Comfort:  · Pain Rating 1: 0/10  · Pain Rating Post-Intervention 1: 0/10    Patients cultural, spiritual, Zoroastrian conflicts given the current situation: none stated     Objective:     Patient found with: telemetry    General Precautions: Standard, fall   Orthopedic Precautions:N/A   Braces: N/A     Occupational Performance:    Bed Mobility:    · Patient completed Rolling/Turning to Right with supervision  · Patient completed Scooting/Bridging with supervision  · Patient completed Supine to Sit with supervision     Functional Mobility/Transfers:  · Patient completed Sit <> Stand Transfer with moderate assistance  with  rolling walker   · Patient completed Toilet Transfer Stand Pivot technique with moderate assistance with  rolling walker  · Functional Mobility: Pt ambulated ~80ft w/ CGA and RW for increased stability. No signs of LOB or SOB noted.     Activities of Daily Living:  · Grooming: supervision brushing through hair w/ fingers while sitting EOB  · UB Dressing: minimum assistance for problem solving to don back gown sitting EOB  · LB Dressing: total assistance to don non-skid socks sitting EOB - pt reports this is baseline and required his son to don his socks PTA  · Toileting: supervision for susanna-hygiene on toilet    Patient left up in chair with all lines intact, call button in reach and mother present    [facility] 6 Click: Self-care  [facility] Total Score: 16    Treatment & Education:  - OT POC  - Importance of OOB activity to maximize recovery   - Safety w/ functional mobility; safe hand placement for transfers to various surfaces  - Discussed compensatory strategies to  facilitate LB dressing - pt reports PTA his son would don/doff his socks  - RW management  Education:    Assessment:     Alon Adamson is a 63 y.o. male with a medical diagnosis of Hepatic encephalopathy.  He presents with the following performance deficits affecting function:  weakness, impaired endurance, impaired self care skills, impaired balance, impaired functional mobilty, gait instability, decreased lower extremity function, edema, impaired skin.      Pt tolerated session well. Pt continues to have difficulty w/ functional tasks requiring increased assist for transfers and LB self-care. Pt presenting w/ functional improvements requiring decreased assist for certain self-care tasks (toileting, UB) and increased distance w/ mobility CGA 2/2 increased endurance and overall balance. Pt continues to benefit from skilled OT to address the above listed impairments and standing ADLs.     Rehab Prognosis:  Good; patient would benefit from acute skilled OT services to address these deficits and reach maximum level of function.       Plan:     Patient to be seen 4 x/week to address the above listed problems via self-care/home management, therapeutic activities, therapeutic exercises  · Plan of Care Expires: 07/12/18  · Plan of Care Reviewed with: patient    This Plan of care has been discussed with the patient who was involved in its development and understands and is in agreement with the identified goals and treatment plan    GOALS:    Occupational Therapy Goals        Problem: Occupational Therapy Goal    Goal Priority Disciplines Outcome Interventions   Occupational Therapy Goal     OT, PT/OT Ongoing (interventions implemented as appropriate)    Description:  Goals to be met by: 6/26/18     Patient will increase functional independence with ADLs by performing:    Feeding with Modified [place].  UE Dressing with Supervision.  LE Dressing with Supervision.  Grooming while standing at sink with  Supervision.  Toileting from toilet with Supervision for hygiene and clothing management.  -- Met (6/14)  Toilet transfer to toilet with Supervision.                       Time Tracking:     OT Date of Treatment: 06/14/18  OT Start Time: 1013  OT Stop Time: 1059  OT Total Time (min): 46 min    Billable Minutes:Self Care/Home Management 40    Prisca Badillo, OT  6/14/2018

## 2018-06-14 NOTE — PT/OT/SLP PROGRESS
Physical Therapy Treatment    Patient Name:  Alon Adamson   MRN:  [id number]    Recommendations:     Discharge Recommendations:  home with home health   Discharge Equipment Recommendations: none   Barriers to discharge: Inaccessible home and Decreased caregiver support    Assessment:     Alon Adamson is a 63 y.o. male admitted with a medical diagnosis of Hepatic encephalopathy.  He presents with the following impairments/functional limitations:  weakness, impaired endurance, impaired balance, gait instability, impaired self care skills, impaired skin Patient tolerated treatment  well. Patient limited at this time by increased A for sit to stand from low objects and increased fatigue with OOB activity.  Patient will continue to require skilled PT services to address the above impairments to return to prior level of function as independent as possible. Discharge recommendation home with home health PT in order to maximize mobility, decrease caregiver burden and increase  functional independence while in the home setting.    .    Rehab Prognosis:  good; patient would benefit from acute skilled PT services to address these deficits and reach maximum level of function.      Recent Surgery: * No surgery found *      Plan:     During this hospitalization, patient to be seen 4 x/week to address the above listed problems via gait training, therapeutic activities, therapeutic exercises, neuromuscular re-education  · Plan of Care Expires:  07/11/18   Plan of Care Reviewed with: patient    Subjective     Communicated with RN prior to session.  Patient found supine upon PT entry to room, agreeable to treatment.      Chief Complaint: pt states he is always itching  Patient comments/goals: pt asks if he did good and if PT thinks he is improving  Pain/Comfort:  · Pain Rating 1: 0/10  · Pain Rating Post-Intervention 1: 0/10    Patients cultural, spiritual, Faith conflicts given the current situation: none  stated    Objective:     Patient found with:  (telemetry)     General Precautions: Standard, fall   Orthopedic Precautions:N/A   Braces: N/A     Functional Mobility:  · Bed Mobility:  Rolling Left:  contact guard assistance  · Scooting: contact guard assistance  · Supine to Sit: minimum assistance  · Transfers:  Sit to Stand:  moderate assistance with rolling walker  · Bed to Chair: contact guard assistance with  rolling walker  using  Step Transfer  · Toilet Transfer: moderate assistance with  rolling walker  using  Step Transfer  · Gait: x 90 feet CGA with RW with chair follow, no increae in sway or LOB  · Balance: CGA during dynamic gait with BUE support from RW      AM-PAC 6 CLICK MOBILITY  Turning over in bed (including adjusting bedclothes, sheets and blankets)?: 4  Sitting down on and standing up from a chair with arms (e.g., wheelchair, bedside commode, etc.): 2  Moving from lying on back to sitting on the side of the bed?: 3  Moving to and from a bed to a chair (including a wheelchair)?: 3  Need to walk in hospital room?: 3  Climbing 3-5 steps with a railing?: 2  Total Score: 17       Therapeutic Activities and Exercises:   sit to stand x 3 trials from varying levels, max A from toilet, mod   Standing at chair x 2 minutes with no AD and no LOB or increase sway    Patient left in chair with all lines intact, call button in reach, RN notified and mom present..    GOALS:    Physical Therapy Goals        Problem: Physical Therapy Goal    Goal Priority Disciplines Outcome Goal Variances Interventions   Physical Therapy Goal     PT/OT, PT Ongoing (interventions implemented as appropriate)     Description:  Goals to be met by: 2018    Patient will increase functional independence with mobility by performin. Supine to sit with Supervision - not met  2. Sit to stand transfer with Supervision using LRAD or no AD - not met  3. Bed to chair transfer with Supervision using LRAD or no AD - not met  4. Gait  x  150 feet with Supervision using LRAD - not met  5. Ascend/descend 5 stair with right Handrails Supervision - not met                       Time Tracking:     PT Received On: 06/14/18  PT Start Time: 1013     PT Stop Time: 1058  PT Total Time (min): 45 min     Billable Minutes: Gait Training 10 min and Therapeutic Activity 24 min    Treatment Type: Treatment  PT/PTA: PT     PTA Visit Number: 0     Rl Mora, PT  06/14/2018

## 2018-06-14 NOTE — TELEPHONE ENCOUNTER
PATIENT NAME: Alon Muller [facility] #: [id number]    Lab Results   Component Value Date    CREATININE 1.5 (H) 06/14/2018     (L) 06/14/2018    BILITOT 2.8 (H) 06/14/2018    ALBUMIN 2.3 (L) 06/14/2018    INR 1.7 (H) 06/14/2018   MELD 23  Encephalopathy: 3 - 4  Ascites: moderate  Dialysis: no     Re certification form sent to  06/14/2018 at 11:51 AM.    Recertification requestor: Yady Dietz

## 2018-06-14 NOTE — PROGRESS NOTES
[facility]  Liver Transplant  Progress Note    Patient Name: Alon Adamson  MRN: [id number]  Admission Date: 6/9/2018  Hospital Length of Stay: 5 days  Code Status: Full Code  Primary Care Provider: Mckinley Vides MD  Post-Operative Day:     ORGAN:     Disease Etiology: Alcoholic Cirrhosis  Donor Type:     CDC High Risk:     Donor CMV Status:   Donor CMV Status:   Donor HBcAB:     Donor HCV Status:     Whole or Partial:   Biliary Anastomosis:   Arterial Anatomy:   Subjective:     History of Present Illness:  HPI:  Mr. Adamson is a 62-year-old man with CAD, seizure disorder on Keppra, DMII, and ETOH cirrhosis with recurrent hepatic encephalopathy, recently placed on the transplant list who presents as a transfer from [facility] where he presented with altered mental status and was diagnosed with hepatic encephalopathy. He was transferred to [facility] under critical care service for further acute care and hepatology evaluation. Per records, son found Mr. Adamson confused at home, reported patient's wife helps manage medication but she was out of town, so unsure if patient was compliant with medications on his own. Infectious work up initiated on admission, blood and urine cultures with NGTD. Head CT was done at previous hospital, report showed no acute process. He was emergently intubated at previous facility before transfer to [facility]. Diagnostic para completed 6/10 showed no SBP, WBC 69, 22%. EEG 6/10 showed intermittent continuous generalized epileptiform discharges so patient was loaded with keppra + BID dosing. Extubated 6/11.          Hospital Course:  On admit, was placed on internal hold due to critical condition. Transferred to LTS service 6/13. Neurology consulted for EEG findings, recommended increasing home dose keppra from 500mg BID to 1500mg BID, EEG without seizure activity. OK to remove from internal HOLD. PETH pending 6/11. Cultures remain NGTD.     Interval  History: no acute events overnight.  AAOx3, delayed responses, mild asterixis. Cont Lactulose and Rifaximin.  FMS removed.  Pt had 5 BMs in past 24 hours.  Neuro was consulted for EEG findings, rec continue keppra at current dose and signed off. Blood cx NGTD.  Urine cultures 6/9 NGTD.  PT/OT ordered. Pt c/o intermittent pains to kavon armpits.  OT may be able to help assistive ROM. +3 BLE edema, restart spironolactone and repeat Lasix 80mg IV BID with albumin x 2 doses. Kidney function stable. Pruritis well managed w significant itching, continue benadryl cream, hydroxyzine, and cholestyramine.     Scheduled Meds:   albumin human 25%  25 g Intravenous Q8H    cholestyramine  1 packet Oral BID    furosemide  60 mg Intravenous BID    heparin (porcine)  5,000 Units Subcutaneous Q8H    lactulose  30 g Oral Q6H    levETIRAcetam  1,500 mg Oral BID    miconazole NITRATE 2 %   Topical (Top) BID    pantoprazole  40 mg Oral BID AC    rifAXImin  550 mg Oral BID    sodium bicarbonate  650 mg Oral BID    spironolactone  200 mg Oral BID     Continuous Infusions:  PRN Meds:dextrose 50%, dextrose 50%, diphenhydrAMINE-zinc acetate 1-0.1%, glucagon (human recombinant), glucose, glucose, hydrOXYzine HCl, insulin aspart U-100, sodium chloride 0.9%    Review of Systems   Constitutional: Positive for appetite change. Negative for chills, fatigue and fever.   Respiratory: Negative for cough, chest tightness and shortness of breath.    Cardiovascular: Positive for leg swelling. Negative for chest pain and palpitations.   Gastrointestinal: Positive for abdominal distention and diarrhea (on lactulose). Negative for abdominal pain, constipation, nausea and vomiting.   Genitourinary: Negative for difficulty urinating, dysuria, frequency and urgency.   Musculoskeletal: Negative for back pain and neck pain.   Skin: Negative for color change, pallor, rash and wound.        pruritis   Neurological: Positive for weakness. Negative for  dizziness and headaches.   Psychiatric/Behavioral: Negative for confusion and sleep disturbance.     Objective:     Vital Signs (Most Recent):  Temp: 98.7 °F (37.1 °C) (06/14/18 1100)  Pulse: 94 (06/14/18 1221)  Resp: 18 (06/14/18 1100)  BP: 122/76 (06/14/18 1100)  SpO2: 98 % (06/14/18 1100) Vital Signs (24h Range):  Temp:  [98 °F (36.7 °C)-98.7 °F (37.1 °C)] 98.7 °F (37.1 °C)  Pulse:  [78-94] 94  Resp:  [18] 18  SpO2:  [95 %-98 %] 98 %  BP: (117-128)/(55-76) 122/76     Weight: (!) 138.2 kg (304 lb 10.8 oz)  Body mass index is 42.49 kg/m².    Intake/Output - Last 3 Shifts       06/12 0700 - 06/13 0659 06/13 0700 - 06/14 0659 06/14 0700 - 06/15 0659    P.O. 1800      I.V. (mL/kg) 160 (1.2)      IV Piggyback 200      Total Intake(mL/kg) 2160 (15.6)      Urine (mL/kg/hr) 935 (0.3) 1425 (0.4)     Emesis/NG output  0 (0)     Stool 800 (0.2) 0 (0)     Blood  0 (0)     Total Output 1735 1425      Net +425 -1425             Urine Occurrence  2 x     Stool Occurrence 2 x 2 x     Emesis Occurrence  0 x           Physical Exam   Constitutional: He is oriented to person, place, and time. He appears well-nourished. He is cooperative.   Eyes: No scleral icterus.   Cardiovascular: Normal rate, regular rhythm, normal heart sounds, intact distal pulses and normal pulses.    No murmur heard.  Pulmonary/Chest: Effort normal. No respiratory distress. He has decreased breath sounds in the right lower field and the left lower field. He has no wheezes. He has no rales.   Abdominal: Soft. Bowel sounds are normal. He exhibits distension. He exhibits no ascites. There is no tenderness. There is no rebound and no guarding.   Musculoskeletal: Normal range of motion. He exhibits edema (+3/+4 edema). He exhibits no tenderness.   Neurological: He is alert and oriented to person, place, and time. He has normal strength.   Skin: Skin is warm, dry and intact. No rash noted. No erythema. There is pallor.   BLE distal erythema with significant flaking  and crusts   Psychiatric: He has a normal mood and affect. His speech is normal and behavior is normal.   Nursing note and vitals reviewed.      Laboratory:  Immunosuppressants     None        CBC:     Recent Labs  Lab 06/14/18  0349   WBC 2.66*   RBC 2.57*   HGB 8.5*   HCT 25.0*   PLT 38*   MCV 97   MCH 33.1*   MCHC 34.0     CMP:     Recent Labs  Lab 06/14/18  0349   *   CALCIUM 8.1*   ALBUMIN 2.3*   PROT 4.8*   *   K 3.4*   CO2 19*      BUN 21   CREATININE 1.5*   ALKPHOS 72   ALT 24   AST 36   BILITOT 2.8*     Coagulation:   Recent Labs  Lab 06/09/18  0056  06/14/18  0349   INR 1.8*  < > 1.7*   APTT 25.9  --   --    < > = values in this interval not displayed.    Labs within the past 24 hours have been reviewed.    Diagnostic Results:  I have personally reviewed all pertinent imaging studies.    Assessment/Plan:     * Hepatic encephalopathy    - acute on chronic  - infectious work up initiated on admission, blood and urine cultures 6/9 NGTD, off all antibiotics  - diagnostic para 6/10, no SBP  - aaox3, continue lactulose and rifaximin        Electroencephalogram (EEG) abnormality without seizure              CKD (chronic kidney disease), stage III    - baseline Cr 1.4-1.6  - Cont Spironolactone, repeat Albumin and Lasix  - monitor kidney function        Cholestatic pruritus    - c/o significant itching  - check bile acids in AM- results pending  - continue hydroxyzine, benadryl cream, and cholestyramine        Metabolic acidosis    - cont po bicarb. Monitor.         Hypoalbuminemia due to protein-calorie malnutrition    - dietary consulted, supplements ordered, monitor oral intake.         Alcoholic cirrhosis    - see decompensated hepatic cirrhosis        Pancytopenia    - see thrombocytopenia          Decompensated hepatic cirrhosis    - ESLD 2/2 ETOH cirrhosis, listed for liver transplant MELD 23, removed from internal hold  - PT/OT ordered for weakness  - PETH pending 6/11    MELD-Na score:  23 at 6/14/2018  3:49 AM  MELD score: 20 at 6/14/2018  3:49 AM  Calculated from:  Serum Creatinine: 1.5 mg/dL at 6/14/2018  3:49 AM  Serum Sodium: 133 mmol/L at 6/14/2018  3:49 AM  Total Bilirubin: 2.8 mg/dL at 6/14/2018  3:49 AM  INR(ratio): 1.7 at 6/14/2018  3:49 AM  Age: 63 years        Coronary artery disease involving native coronary artery of native heart without angina pectoris    - Stable  - Known CAD diagnosed by aniogram in 3/17 showing distal LAD has a 75% stenosis, normal LCFx and RCA, 75% ostial PDA, for which underwent PCI  - Recently underwent  stress echo read by Dr. Xavier which demonstrated no ischemia at peak  (89%) predicted  - Most recent TTE from 1/2018 significant for normal LVEF with biatrial enlargement, after which he underwent RHC which was normal  - Resumed ASA, continue tele        Thrombocytopenia    - plt 38.  No signs of bleeding.  Cont to monitor.          Hypoalbuminemia    - albumin w Lasix.  - dietician consulted.          Seizures    - h/o seizures on keppra 500mg BID  - admit with AMS, EEG 6/10 with encephalopathy  - neuro consulted for EEG findings, no seizures seen on EEG, rec increase keppra to 1500mg BID        Bilateral edema of lower extremity    - restarted home Spironolactone 200mg BID  - repeat Lasix 80mg IV BID with albumin x 2 doses today  - low Na diet            VTE Risk Mitigation         Ordered     heparin (porcine) injection 5,000 Units  Every 8 hours      06/13/18 1124     IP VTE HIGH RISK PATIENT  Once      06/09/18 0050     Place sequential compression device  Until discontinued      06/09/18 0050          The patients clinical status was discussed at multidisplinary rounds, involving transplant surgery, transplant medicine, pharmacy, nursing, nutrition, and social work    Discharge Planning:  No Patient Care Coordination Note on file.      Ana Paula Cleary NP  Liver Transplant  [facility]

## 2018-06-14 NOTE — PHYSICIAN QUERY
"PT Name: Alon Adamson  MR #: [id number]    Physician Query Form - Consultant Diagnosis Clarification     CDS/: Forrest BOYD,RN,CDI            Contact information:[phone]      This form is a permanent document in the medical record.     Query Date: June 14, 2018      By submitting this query, we are merely seeking further clarification of documentation.  Please utilize your independent clinical judgment when addressing the question(s) below.      The Medical record contains the following:   Diagnosis Supporting Clinical Information Location in Medical Record         Morbid Obesity   Height: 5' 11" (180.3 cm)    Weight Method: Bed Scale    Weight: (!) 138.2 kg (304 lb 10.8 oz)    Weight (lb): (!) 304.68 lb    BMI (Calculated): 42.6    BMI Grade: greater than 40 - morbid obesity           06/13/18 Adult Nutrition Consult         Do you agree with the Consultants diagnosis of ____Morbid Obesity_______________________________?                 [  x ]   Yes                      [   ]   No                      [   ]   Clinically undetermined                 [   ]   Other/Clarification of findings: ___________________________________________                     "

## 2018-06-15 DIAGNOSIS — K70.31 ALCOHOLIC CIRRHOSIS OF LIVER WITH ASCITES: Primary | ICD-10-CM

## 2018-06-15 LAB
ALBUMIN SERPL BCP-MCNC: 2.7 G/DL
ALP SERPL-CCNC: 69 U/L
ALT SERPL W/O P-5'-P-CCNC: 20 U/L
ANION GAP SERPL CALC-SCNC: 8 MMOL/L
ANISOCYTOSIS BLD QL SMEAR: SLIGHT
AST SERPL-CCNC: 28 U/L
BASOPHILS # BLD AUTO: 0.01 K/UL
BASOPHILS NFR BLD: 0.4 %
BILE AC SERPL-SCNC: 58 MCMOL/L
BILIRUB SERPL-MCNC: 3.3 MG/DL
BUN SERPL-MCNC: 19 MG/DL
CALCIUM SERPL-MCNC: 8.5 MG/DL
CHLORIDE SERPL-SCNC: 108 MMOL/L
CO2 SERPL-SCNC: 20 MMOL/L
CREAT SERPL-MCNC: 1.5 MG/DL
DIFFERENTIAL METHOD: ABNORMAL
EOSINOPHIL # BLD AUTO: 0.3 K/UL
EOSINOPHIL NFR BLD: 11.2 %
ERYTHROCYTE [DISTWIDTH] IN BLOOD BY AUTOMATED COUNT: 17.2 %
EST. GFR  (AFRICAN AMERICAN): 56.9 ML/MIN/1.73 M^2
EST. GFR  (NON AFRICAN AMERICAN): 49.2 ML/MIN/1.73 M^2
GLUCOSE SERPL-MCNC: 132 MG/DL
HCT VFR BLD AUTO: 24.4 %
HGB BLD-MCNC: 8.3 G/DL
HYPOCHROMIA BLD QL SMEAR: ABNORMAL
IMM GRANULOCYTES # BLD AUTO: 0 K/UL
IMM GRANULOCYTES NFR BLD AUTO: 0 %
INR PPP: 1.7
LYMPHOCYTES # BLD AUTO: 0.4 K/UL
LYMPHOCYTES NFR BLD: 17 %
MAGNESIUM SERPL-MCNC: 1.6 MG/DL
MCH RBC QN AUTO: 33.2 PG
MCHC RBC AUTO-ENTMCNC: 34 G/DL
MCV RBC AUTO: 98 FL
MONOCYTES # BLD AUTO: 0.5 K/UL
MONOCYTES NFR BLD: 19.9 %
NEUTROPHILS # BLD AUTO: 1.2 K/UL
NEUTROPHILS NFR BLD: 51.5 %
NRBC BLD-RTO: 0 /100 WBC
PHOSPHATE SERPL-MCNC: 1.9 MG/DL
PLATELET # BLD AUTO: 35 K/UL
PLATELET BLD QL SMEAR: ABNORMAL
PMV BLD AUTO: 11.9 FL
POTASSIUM SERPL-SCNC: 3.6 MMOL/L
PROT SERPL-MCNC: 5 G/DL
PROTHROMBIN TIME: 16.4 SEC
RBC # BLD AUTO: 2.5 M/UL
SODIUM SERPL-SCNC: 136 MMOL/L
WBC # BLD AUTO: 2.41 K/UL

## 2018-06-15 PROCEDURE — 25000003 PHARM REV CODE 250: Mod: NTX | Performed by: NURSE PRACTITIONER

## 2018-06-15 PROCEDURE — 63600175 PHARM REV CODE 636 W HCPCS: Mod: NTX | Performed by: NURSE PRACTITIONER

## 2018-06-15 PROCEDURE — 36415 COLL VENOUS BLD VENIPUNCTURE: CPT | Mod: NTX

## 2018-06-15 PROCEDURE — 83735 ASSAY OF MAGNESIUM: CPT | Mod: NTX

## 2018-06-15 PROCEDURE — 85025 COMPLETE CBC W/AUTO DIFF WBC: CPT | Mod: NTX

## 2018-06-15 PROCEDURE — 85610 PROTHROMBIN TIME: CPT | Mod: NTX

## 2018-06-15 PROCEDURE — 20600001 HC STEP DOWN PRIVATE ROOM: Mod: NTX

## 2018-06-15 PROCEDURE — 25000003 PHARM REV CODE 250: Mod: NTX | Performed by: PHYSICIAN ASSISTANT

## 2018-06-15 PROCEDURE — 84100 ASSAY OF PHOSPHORUS: CPT | Mod: NTX

## 2018-06-15 PROCEDURE — 25000003 PHARM REV CODE 250: Mod: NTX | Performed by: STUDENT IN AN ORGANIZED HEALTH CARE EDUCATION/TRAINING PROGRAM

## 2018-06-15 PROCEDURE — 80053 COMPREHEN METABOLIC PANEL: CPT | Mod: NTX

## 2018-06-15 RX ORDER — FUROSEMIDE 10 MG/ML
60 INJECTION INTRAMUSCULAR; INTRAVENOUS 2 TIMES DAILY
Status: COMPLETED | OUTPATIENT
Start: 2018-06-15 | End: 2018-06-16

## 2018-06-15 RX ADMIN — SPIRONOLACTONE 200 MG: 100 TABLET, FILM COATED ORAL at 09:06

## 2018-06-15 RX ADMIN — MICONAZOLE NITRATE: 20 POWDER TOPICAL at 09:06

## 2018-06-15 RX ADMIN — LACTULOSE 30 G: 20 SOLUTION ORAL at 02:06

## 2018-06-15 RX ADMIN — LEVETIRACETAM 1500 MG: 750 TABLET ORAL at 09:06

## 2018-06-15 RX ADMIN — LACTULOSE 30 G: 20 SOLUTION ORAL at 01:06

## 2018-06-15 RX ADMIN — MICONAZOLE NITRATE: 20 POWDER TOPICAL at 10:06

## 2018-06-15 RX ADMIN — SODIUM BICARBONATE 650 MG TABLET 650 MG: at 09:06

## 2018-06-15 RX ADMIN — HEPARIN SODIUM 5000 UNITS: 5000 INJECTION, SOLUTION INTRAVENOUS; SUBCUTANEOUS at 02:06

## 2018-06-15 RX ADMIN — RIFAXIMIN 550 MG: 550 TABLET ORAL at 09:06

## 2018-06-15 RX ADMIN — LACTULOSE 30 G: 20 SOLUTION ORAL at 05:06

## 2018-06-15 RX ADMIN — HEPARIN SODIUM 5000 UNITS: 5000 INJECTION, SOLUTION INTRAVENOUS; SUBCUTANEOUS at 09:06

## 2018-06-15 RX ADMIN — PANTOPRAZOLE SODIUM 40 MG: 40 TABLET, DELAYED RELEASE ORAL at 05:06

## 2018-06-15 RX ADMIN — CHOLESTYRAMINE 4 G: 4 POWDER, FOR SUSPENSION ORAL at 10:06

## 2018-06-15 RX ADMIN — SPIRONOLACTONE 200 MG: 100 TABLET, FILM COATED ORAL at 10:06

## 2018-06-15 RX ADMIN — RIFAXIMIN 550 MG: 550 TABLET ORAL at 10:06

## 2018-06-15 RX ADMIN — LEVETIRACETAM 1500 MG: 750 TABLET ORAL at 10:06

## 2018-06-15 RX ADMIN — SODIUM BICARBONATE 650 MG TABLET 650 MG: at 10:06

## 2018-06-15 RX ADMIN — POTASSIUM PHOSPHATE, MONOBASIC AND POTASSIUM PHOSPHATE, DIBASIC 30 MMOL: 224; 236 INJECTION, SOLUTION, CONCENTRATE INTRAVENOUS at 10:06

## 2018-06-15 RX ADMIN — FUROSEMIDE 60 MG: 10 INJECTION, SOLUTION INTRAMUSCULAR; INTRAVENOUS at 05:06

## 2018-06-15 RX ADMIN — CHOLESTYRAMINE 4 G: 4 POWDER, FOR SUSPENSION ORAL at 09:06

## 2018-06-15 NOTE — PROGRESS NOTES
Met with patient and his mother in preparation for possible weekend discharge.  Patient and mother instructed on return appointment 6/22/18.  Reviewed [employer] [facility] [employer] booklet.  Allowed time for questions and answers.

## 2018-06-15 NOTE — ASSESSMENT & PLAN NOTE
- c/o significant itching  - check bile acids in AM- results pending  - continue hydroxyzine, Aveeno cream, and cholestyramine

## 2018-06-15 NOTE — PROGRESS NOTES
Discharge Note: Patient tentatively scheduled to be discharged Saturday to his home in [place] under the care of his wife Erica Adamson ph# [phone].  The pt was observed to be sitting upright in bed alert, oriented x 4 and communicative.  The wife of the pt was present observed to be working in her puzzle book.  Caregiver did engage with SW at this time reported adequate coping.   The pt denied any questions or concerns at this time.  Pt and wife did question if there was a way of service SW knew of that could assist with them having a ramp built at their home due to the pt requiring more assistance.  [facility] a [employer] .  SW discussed with the patient the recommendation for him to have HH PT/OT.  The pt reported he does not have a company he wants to use for SW to arrange on his behalf.  The pt inquired if he did not like the company could he find another provider.  SW encouraged the pt to use autonomy and if he does not like the services being provided to call and SW can assist with arranging another.  The patient reported that he is coping adequately with discharge plan.  There were no further needs identified. Patient verbalized understanding and agreement with the information reviewed, social work availability, and how to access available resources if needed. SW remains available.     SW contact [facility] ph# [phone] to ensure the pts insurance was accepted and provided a heads up for referral.  LEAH was advised the pt will be admitted for service on Monday.  LEAH confirmed all verbal orders and information.  LEAH to fax orders, H&P, progress note, insurance docs to fax # [phone].

## 2018-06-15 NOTE — PHYSICIAN QUERY
PT Name: Alon Adamson  MR #: [id number]     Physician Query Form - Documentation Clarification      CDS/: Forrest BOYD,RN,CDI            Contact information:[phone]    This form is a permanent document in the medical record.     Query Date: Bibiana 15, 2018    By submitting this query, we are merely seeking further clarification of documentation. Please utilize your independent clinical judgment when addressing the question(s) below.    The Medical record reflects the following:    Supporting Clinical Findings Location in Medical Record        Phosphorus=2.2----->3.3----->2.4---->1.9       Potassium=3.6---->3.5---->3.4--->3.6             06/09---------------------->06/15 Labs        potassium phosphate 30 mmol in dextrose 5 % 500 mL     Infusion  Dose: 30 mmol      Freq: Once Route: IV     potassium chloride 10% oral solution 20 mEq     Dose: 20 mEq Freq: Once Route: PER NG TUBE     potassium chloride CR capsule 20 mEq     Dose: 20 mEq Freq: Once Route: Oral     potassium chloride CR capsule 20 mEq     Dose: 20 mEq           Freq: Once Route: Oral                06/09------------------------->06/15 MAR                                                                                    Doctor, Please specify diagnosis or diagnoses associated with above clinical findings.    Provider Use Only       ___x__ Hypophosphatemia       __x__  Hypokalemia       ____ Other Please Specify_________________________________                                                                                                                           [  ] Clinically undetermined

## 2018-06-15 NOTE — PROGRESS NOTES
[facility]  Liver Transplant  Progress Note    Patient Name: Alon Adamson  MRN: [id number]  Admission Date: 6/9/2018  Hospital Length of Stay: 6 days  Code Status: Full Code  Primary Care Provider: Mckinley Vides MD  Post-Operative Day:     ORGAN:     Disease Etiology: Alcoholic Cirrhosis  Donor Type:     CDC High Risk:     Donor CMV Status:   Donor CMV Status:   Donor HBcAB:     Donor HCV Status:     Whole or Partial:   Biliary Anastomosis:   Arterial Anatomy:   Subjective:     History of Present Illness:  HPI:  Mr. Adamson is a 62-year-old man with CAD, seizure disorder on Keppra, DMII, and ETOH cirrhosis with recurrent hepatic encephalopathy, recently placed on the transplant list who presents as a transfer from [facility] where he presented with altered mental status and was diagnosed with hepatic encephalopathy. He was transferred to [facility] under critical care service for further acute care and hepatology evaluation. Per records, son found Mr. Adamson confused at home, reported patient's wife helps manage medication but she was out of town, so unsure if patient was compliant with medications on his own. Infectious work up initiated on admission, blood and urine cultures with NGTD. Head CT was done at previous hospital, report showed no acute process. He was emergently intubated at previous facility before transfer to [facility]. Diagnostic para completed 6/10 showed no SBP, WBC 69, 22%. EEG 6/10 showed intermittent continuous generalized epileptiform discharges so patient was loaded with keppra + BID dosing. Extubated 6/11.          Hospital Course:  On admit, was placed on internal hold due to critical condition. Transferred to LTS service 6/13. Neurology consulted for EEG findings, recommended increasing home dose keppra from 500mg BID to 1500mg BID, EEG without seizure activity. OK to remove from internal HOLD. PETH pending 6/11. Cultures remain NGTD.     Interval  History: no acute events overnight.  AAOx4.  Cont Lactulose and Rifaximin.  Pt having 2-4 BMs daily.  Neuro was consulted for EEG findings, rec continue keppra at current dose and signed off. Pt taken off internal hold 6/14/18.  Blood cx NGTD.  Urine cultures 6/9 NGTD.  PT following and recommend HHC w PT.  OT following. +2 BLE edema improved.  Cont Spironolactone 200 mg and Lasix 60mg IV BID.  Kidney function stable. Pruritis managed w Aveeno cream, hydroxyzine, and cholestyramine.  Possible discharge over the weekend.     Scheduled Meds:   cholestyramine  1 packet Oral BID    furosemide  60 mg Intravenous BID    heparin (porcine)  5,000 Units Subcutaneous Q8H    lactulose  30 g Oral Q6H    levETIRAcetam  1,500 mg Oral BID    miconazole NITRATE 2 %   Topical (Top) BID    pantoprazole  40 mg Oral BID AC    rifAXImin  550 mg Oral BID    sodium bicarbonate  650 mg Oral BID    spironolactone  200 mg Oral BID     Continuous Infusions:  PRN Meds:dextrose 50%, dextrose 50%, diphenhydrAMINE-zinc acetate 1-0.1%, glucagon (human recombinant), glucose, glucose, hydrOXYzine HCl, insulin aspart U-100, sodium chloride 0.9%    Review of Systems   Constitutional: Positive for appetite change. Negative for chills, fatigue and fever.   Respiratory: Negative for cough, chest tightness and shortness of breath.    Cardiovascular: Positive for leg swelling. Negative for chest pain and palpitations.   Gastrointestinal: Positive for abdominal distention and diarrhea (on lactulose). Negative for abdominal pain, constipation, nausea and vomiting.   Genitourinary: Negative for difficulty urinating, dysuria, frequency and urgency.   Musculoskeletal: Negative for back pain and neck pain.   Skin: Negative for color change, pallor, rash and wound.        pruritis   Neurological: Positive for weakness. Negative for dizziness and headaches.   Psychiatric/Behavioral: Negative for confusion and sleep disturbance.     Objective:     Vital  Signs (Most Recent):  Temp: 98.2 °F (36.8 °C) (06/15/18 1157)  Pulse: 90 (06/15/18 1438)  Resp: 17 (06/15/18 0426)  BP: (!) 120/58 (06/15/18 1157)  SpO2: 97 % (06/15/18 1157) Vital Signs (24h Range):  Temp:  [98.2 °F (36.8 °C)-98.9 °F (37.2 °C)] 98.2 °F (36.8 °C)  Pulse:  [60-90] 90  Resp:  [17] 17  SpO2:  [95 %-98 %] 97 %  BP: (120-134)/(58-60) 120/58     Weight: (!) 138.2 kg (304 lb 10.8 oz)  Body mass index is 42.49 kg/m².    Intake/Output - Last 3 Shifts       06/13 0700 - 06/14 0659 06/14 0700 - 06/15 0659 06/15 0700 - 06/16 0659    Urine (mL/kg/hr) 1425 (0.4) 825 (0.2)     Emesis/NG output 0 (0) 0 (0)     Stool 0 (0) 0 (0)     Blood 0 (0) 0 (0)     Total Output 1425 825      Net -1425 -825             Urine Occurrence 2 x      Stool Occurrence 2 x      Emesis Occurrence 0 x            Physical Exam   Constitutional: He is oriented to person, place, and time. He appears well-nourished. He is cooperative.   Eyes: No scleral icterus.   Cardiovascular: Normal rate, regular rhythm, normal heart sounds, intact distal pulses and normal pulses.    No murmur heard.  Pulmonary/Chest: Effort normal. No respiratory distress. He has decreased breath sounds in the right lower field and the left lower field. He has no wheezes. He has no rales.   Abdominal: Soft. Bowel sounds are normal. He exhibits distension. He exhibits no ascites. There is no tenderness. There is no rebound and no guarding.   Musculoskeletal: Normal range of motion. He exhibits edema (+3/+4 edema). He exhibits no tenderness.   Neurological: He is alert and oriented to person, place, and time. He has normal strength.   Skin: Skin is warm, dry and intact. No rash noted. No erythema. There is pallor.   BLE distal erythema with significant flaking and crusts   Psychiatric: He has a normal mood and affect. His speech is normal and behavior is normal.   Nursing note and vitals reviewed.      Laboratory:  Immunosuppressants     None        CBC:     Recent  Labs  Lab 06/15/18  0325   WBC 2.41*   RBC 2.50*   HGB 8.3*   HCT 24.4*   PLT 35*   MCV 98   MCH 33.2*   MCHC 34.0     CMP:     Recent Labs  Lab 06/15/18  0324   *   CALCIUM 8.5*   ALBUMIN 2.7*   PROT 5.0*      K 3.6   CO2 20*      BUN 19   CREATININE 1.5*   ALKPHOS 69   ALT 20   AST 28   BILITOT 3.3*     Coagulation:   Recent Labs  Lab 06/09/18  0056  06/15/18  0324   INR 1.8*  < > 1.7*   APTT 25.9  --   --    < > = values in this interval not displayed.    Labs within the past 24 hours have been reviewed.    Diagnostic Results:  I have personally reviewed all pertinent imaging studies.    Assessment/Plan:     * Hepatic encephalopathy    - acute on chronic  - infectious work up initiated on admission, blood and urine cultures 6/9 NGTD, off all antibiotics  - diagnostic para 6/10, no SBP  - aaox3, continue lactulose and rifaximin        Electroencephalogram (EEG) abnormality without seizure              CKD (chronic kidney disease), stage III    - baseline Cr 1.4-1.6  - Cont Spironolactone, repeat Lasix  - monitor kidney function, strict I/O's.  Weight daily.        Cholestatic pruritus    - c/o significant itching  - check bile acids in AM- results pending  - continue hydroxyzine, Aveeno cream, and cholestyramine        Metabolic acidosis    - cont po bicarb. Monitor.         Hypoalbuminemia due to protein-calorie malnutrition    - dietary consulted, supplements ordered, monitor oral intake.         Alcoholic cirrhosis    - see decompensated hepatic cirrhosis        Pancytopenia    - see thrombocytopenia          Decompensated hepatic cirrhosis    - ESLD 2/2 ETOH cirrhosis, listed for liver transplant MELD 23, removed from internal hold  - PT/OT ordered for weakness  - PETH pending 6/11    MELD-Na score: 22 at 6/15/2018  3:24 AM  MELD score: 21 at 6/15/2018  3:24 AM  Calculated from:  Serum Creatinine: 1.5 mg/dL at 6/15/2018  3:24 AM  Serum Sodium: 136 mmol/L at 6/15/2018  3:24 AM  Total  Bilirubin: 3.3 mg/dL at 6/15/2018  3:24 AM  INR(ratio): 1.7 at 6/15/2018  3:24 AM  Age: 63 years        Coronary artery disease involving native coronary artery of native heart without angina pectoris    - Stable  - Known CAD diagnosed by aniogram in 3/17 showing distal LAD has a 75% stenosis, normal LCFx and RCA, 75% ostial PDA, for which underwent PCI  - Recently underwent  stress echo read by Dr. Xavier which demonstrated no ischemia at peak  (89%) predicted  - Most recent TTE from 1/2018 significant for normal LVEF with biatrial enlargement, after which he underwent RHC which was normal  - Resumed ASA, continue tele        Anemia of chronic disease    - CBC stable. Monitor.          Thrombocytopenia    - plt 35.  No signs of bleeding.  Cont to monitor.          Seizures    - h/o seizures on keppra 500mg BID  - admit with AMS, EEG 6/10 with encephalopathy  - neuro consulted for EEG findings, no seizures seen on EEG, rec increase keppra to 1500mg BID        Bilateral edema of lower extremity    - restarted home Spironolactone 200mg BID  - repeat Lasix 60mg IV BID.  If discharge may Rx lasix 80 mg PO bid and spironolactone 200 mg or 300 mg if remains w hypokalemia.  - low Na diet  - Wd care consulted for dry skin to BLE.              VTE Risk Mitigation         Ordered     heparin (porcine) injection 5,000 Units  Every 8 hours      06/13/18 1124     IP VTE HIGH RISK PATIENT  Once      06/09/18 0050     Place sequential compression device  Until discontinued      06/09/18 0050          The patients clinical status was discussed at multidisplinary rounds, involving transplant surgery, transplant medicine, pharmacy, nursing, nutrition, and social work    Discharge Planning:  Possible discharge over the weekend.  [facility] PT ordered.      Ana Paula Cleary NP  Liver Transplant  [facility]

## 2018-06-15 NOTE — DISCHARGE SUMMARY
[facility]  Liver Transplant  Discharge Summary      Patient Name: Alon Adamson  MRN: [id number]  Admission Date: 6/9/2018  Hospital Length of Stay: 6 days  Discharge Date and Time:  06/16/2018 11:33 AM  Attending Physician: Regulo Gómez MD   Discharging Provider: Ana Paula Cleary NP  Primary Care Provider: Mckinley Vides MD    HPI:  Mr. Adamson is a 62-year-old man with CAD, seizure disorder on Keppra, DMII, and ETOH cirrhosis with recurrent hepatic encephalopathy, recently placed on the transplant list (6/1/18) who presented as a transfer from [facility] where he presented with altered mental status and was diagnosed with hepatic encephalopathy. He was transferred to [facility] under critical care service for further acute care and hepatology evaluation. Per records, son found Mr. Adamson confused at home, reported patient's wife helps manage medication but she was out of town, so unsure if patient was compliant with medications on his own. Infectious work up initiated on admission, blood and urine cultures with NGTD. Head CT was done at previous hospital, report showed no acute process. He was emergently intubated at previous facility before transfer to [facility]. Diagnostic para completed 6/10 showed no SBP, WBC 69, 22%. EEG 6/10 showed intermittent continuous generalized epileptiform discharges Pt was on Keppra 500mg bid at home.  Patient was loaded with keppra + BID dosing. Pt was extubated 6/11.      On admit, was placed on internal hold due to critical condition. Transferred to LTS service 6/13. Neurology consulted for EEG findings, recommended increasing home dose keppra from 500mg BID to 1500mg BID, EEG without seizure activity. OK to remove from internal HOLD 6/14/18. PETH pending 6/11. Cultures remain NGTD.     During hospital stay, patient w increased weakness.  PT/OT ordered and recommended HHC w PT (ordered).  BLE edema improved. Consulted wound care to  assess dry skin. Pt diuresed well on Spironolactone 200 mg and Lasix 60mg IV BID.  Kidney function stable. Recommend Spironolactone 200 mg BID and Lasix 80 mg PO bid.  Total bili increased.  Pruritis managed w Aveeno cream, hydroxyzine, and cholestyramine.      Plan for discharge today to home.  Discharge instructions reviewed per Nursing and Pharmacist.  Pt and wife deny questions and are in agreement w discharge to home.  Liver transplant coordinator notified of discharge- follow up per PDOL.      Final Active Diagnoses:    Diagnosis Date Noted POA    PRINCIPAL PROBLEM:  Hepatic encephalopathy [K72.90] 02/17/2016 Yes    Electroencephalogram (EEG) abnormality without seizure [R94.01] 06/14/2018 Yes    Hypoalbuminemia due to protein-calorie malnutrition [E46] 06/13/2018 Yes    Metabolic acidosis [E87.2] 06/13/2018 No    Cholestatic pruritus [L29.8] 06/13/2018 Yes    CKD (chronic kidney disease), stage III [N18.3] 06/13/2018 Yes    Alcoholic cirrhosis [K70.30] 06/09/2018 Yes    Pancytopenia [D61.818] 01/22/2018 Yes    Decompensated hepatic cirrhosis [K72.90] 01/21/2018 Yes    Coronary artery disease involving native coronary artery of native heart without angina pectoris [I25.10] 03/16/2017 Yes    Thrombocytopenia [D69.6] 08/30/2016 Yes    Anemia of chronic disease [D63.8] 08/30/2016 Yes    Seizures [R56.9] 05/12/2016 Yes    Bilateral edema of lower extremity [R60.0] 02/17/2016 Yes      Problems Resolved During this Admission:    Diagnosis Date Noted Date Resolved POA    Endotracheally intubated [Z97.8] 06/09/2018 06/13/2018 Yes    Bacteriuria [R82.71] 06/09/2018 06/13/2018 Yes    Hypoalbuminemia [E88.09] 08/30/2016 06/15/2018 Yes    Diastolic dysfunction [I51.9] 11/25/2015 06/09/2018 Yes       Consults         Status Ordering Provider     Inpatient consult to Hepatology  Once     Provider:  (Not yet assigned)    Completed JORGE LUNA     Inpatient consult to Neurology  Once     Provider:   (Not yet assigned)    Completed DARIUS ALCOCER     Inpatient consult to Registered Dietitian/Nutritionist  Once     Provider:  (Not yet assigned)    Completed DARIUS ALCOCER          Pending Diagnostic Studies:     Procedure Component Value Units Date/Time    Phosphatidylethanol (PETH) [[id number]] Collected:  06/11/18 0945    Order Status:  Sent Lab Status:  In process Updated:  06/11/18 1120    Specimen:  Blood from Blood         Significant Diagnostic Studies: Labs:   CMP   Recent Labs  Lab 06/15/18  0324 06/16/18  0349    136   K 3.6 3.7    110   CO2 20* 19*   * 119*   BUN 19 16   CREATININE 1.5* 1.4   CALCIUM 8.5* 8.5*   PROT 5.0* 4.8*   ALBUMIN 2.7* 2.5*   BILITOT 3.3* 3.7*   ALKPHOS 69 64   AST 28 24   ALT 20 19   ANIONGAP 8 7*   ESTGFRAFRICA 56.9* >60.0   EGFRNONAA 49.2* 53.5*    and CBC   Recent Labs  Lab 06/15/18  0325 06/16/18  0349   WBC 2.41* 3.81*   HGB 8.3* 8.9*   HCT 24.4* 25.6*   PLT 35* 41*       The patients clinical status was discussed at multidisplinary rounds, involving transplant surgery, transplant medicine, pharmacy, nursing, nutrition, and social work    Discharged Condition: fair    Disposition: to home w HHC w PT    Follow Up: per liver transplant coordinator    Patient Instructions:     Referral to Home health   Referral Priority: Routine Referral Type: Home Health Care   Referral Reason: Specialty Services Required    Requested Specialty: Home Health Services    Number of Visits Requested: 1        Medications:  Reconciled Home Medications:      Medication List      ASK your doctor about these medications    ascorbic acid (vitamin C) 1000 MG tablet  Commonly known as:  VITAMIN C  Take 1,000 mg by mouth once daily.     aspirin 81 MG EC tablet  Commonly known as:  ECOTRIN  Take 1 tablet (81 mg total) by mouth once daily.     atorvastatin 40 MG tablet  Commonly known as:  LIPITOR  TAKE 1 TABLET(40 MG) BY MOUTH EVERY DAY     docusate sodium 100 MG capsule  Commonly known  as:  COLACE  Take 100 mg by mouth once daily.     ferrous gluconate 270 mg (27 mg iron) Tab  Take 1 tablet by mouth once daily.     fish oil-omega-3 fatty acids 300-1,000 mg capsule  Take 2 capsules (2 g total) by mouth once daily.     furosemide 80 MG tablet  Commonly known as:  LASIX  Take 1 tablet (80 mg total) by mouth 2 (two) times daily.     GENERLAC 10 gram/15 mL solution  Generic drug:  lactulose  Take 60 mLs (40 g total) by mouth 3 (three) times daily. 60mg three times per day     levETIRAcetam 500 MG Tab  Commonly known as:  KEPPRA  Take 1 tablet (500 mg total) by mouth 2 (two) times daily.     magnesium oxide 400 mg tablet  Commonly known as:  MAG-OX  Take 1 tablet (400 mg total) by mouth once daily.     ondansetron 4 MG tablet  Commonly known as:  ZOFRAN  Take 1 tablet (4 mg total) by mouth every 8 (eight) hours as needed for Nausea.     pantoprazole 40 MG tablet  Commonly known as:  PROTONIX  Take 1 tablet (40 mg total) by mouth 2 (two) times daily before meals.     rifAXIMin 550 mg Tab  Commonly known as:  XIFAXAN  Take 1 tablet (550 mg total) by mouth 2 (two) times daily.     spironolactone 100 MG tablet  Commonly known as:  ALDACTONE  Take 2 tablets (200 mg total) by mouth 2 (two) times daily.     traMADol 50 mg tablet  Commonly known as:  ULTRAM  Take 50 mg by mouth every 6 (six) hours as needed for Pain.          Time spent caring for patient (Greater than 1/2 spent in direct face-to-face contact): > 30 minutes    Ana Paula Cleary NP  Liver Transplant  [facility]

## 2018-06-15 NOTE — PHYSICIAN QUERY
PT Name: Alon Adamson  MR #: [id number]     Physician Query Form - Medication-Correlation for Diagnosis      CDS/: Forrest BOYD,RN,CDI            Contact information:[phone]    This form is a permanent document in the medical record.     Query Date: Bibiana 15, 2018      By submitting this query, we are merely seeking further clarification of documentation.  Please utilize your independent clinical judgment when addressing the question(s) below.    The medical record contains the following:  The patient has an order for the following medication(s):      magnesium oxide tablet 400 mg    Dose: 400 mg Freq: Once Route: OG      magnesium sulfate 1 g in sodium chloride 0.9% 50 mL IVPB    Dose: 1 g Freq: Once Route: IV         Please provider the corresponding diagnosis or diagnoses that support(s) the use of the medication(s)   Physician Use Only           hypomagnesemia

## 2018-06-15 NOTE — PHYSICIAN QUERY
PT Name: Alon Adamson  MR #: [id number]     Physician Query Form - Documentation Clarification      CDS/: Forrest BOYD,RN,CDI            Contact information:[phone]  This form is a permanent document in the medical record.     Query Date: Bibiana 15, 2018    By submitting this query, we are merely seeking further clarification of documentation. Please utilize your independent clinical judgment when addressing the question(s) below.    The Medical record reflects the following:    Supporting Clinical Findings Location in Medical Record         Hepatic Encephalopathy- Intubated for        altered mental status.        Mr. Adamson is a 62-year-old man with         EtOH cirrhosis with        recurrent hepatic encephalopathy        Suspected etiology non-compliance with home        lactulose and rifaximin              06/09/18 Critical Care Medicine H&P          rifAXImin tablet 400 mg Dose: 400 mg       Freq: 3 times daily Route: Oral       rifAXIMin tablet 550 mg Dose: 550 mg       Freq: 2 times daily Route: Oral       lactulose 20 gram/30 mL solution Soln 30 g       Dose: 30 g Freq: Every 6 hours Route: Oral                06/09----------------------->06/15 MAR                                                                            Doctor, Please specify diagnosis or diagnoses associated with above clinical findings.    Please specify the acuity of hepatic encephalopathy:      Provider Use Only      ____x___  Acute on chronic hepatic encephalopathy with coma       ______  Chronic hepatic encephalopathy without coma       ______  Other please specify____________________________                                                                                                                         [  ] Clinically undetermined

## 2018-06-15 NOTE — ASSESSMENT & PLAN NOTE
- ESLD 2/2 ETOH cirrhosis, listed for liver transplant MELD 23, removed from internal hold  - PT/OT ordered for weakness  - PETH pending 6/11    MELD-Na score: 22 at 6/15/2018  3:24 AM  MELD score: 21 at 6/15/2018  3:24 AM  Calculated from:  Serum Creatinine: 1.5 mg/dL at 6/15/2018  3:24 AM  Serum Sodium: 136 mmol/L at 6/15/2018  3:24 AM  Total Bilirubin: 3.3 mg/dL at 6/15/2018  3:24 AM  INR(ratio): 1.7 at 6/15/2018  3:24 AM  Age: 63 years

## 2018-06-15 NOTE — ASSESSMENT & PLAN NOTE
- baseline Cr 1.4-1.6  - Cont Spironolactone, repeat Lasix  - monitor kidney function, strict I/O's.  Weight daily.

## 2018-06-15 NOTE — ASSESSMENT & PLAN NOTE
- restarted home Spironolactone 200mg BID  - repeat Lasix 60mg IV BID.  If discharge may Rx lasix 80 mg PO bid and spironolactone 200 mg or 300 mg if remains w hypokalemia.  - low Na diet  - Wd care consulted for dry skin to BLE.

## 2018-06-16 VITALS
HEART RATE: 86 BPM | WEIGHT: 304.69 LBS | RESPIRATION RATE: 18 BRPM | TEMPERATURE: 98 F | SYSTOLIC BLOOD PRESSURE: 124 MMHG | HEIGHT: 71 IN | OXYGEN SATURATION: 98 % | DIASTOLIC BLOOD PRESSURE: 79 MMHG | BODY MASS INDEX: 42.65 KG/M2

## 2018-06-16 LAB
ALBUMIN SERPL BCP-MCNC: 2.5 G/DL
ALP SERPL-CCNC: 64 U/L
ALT SERPL W/O P-5'-P-CCNC: 19 U/L
ANION GAP SERPL CALC-SCNC: 7 MMOL/L
AST SERPL-CCNC: 24 U/L
BASOPHILS # BLD AUTO: 0.03 K/UL
BASOPHILS NFR BLD: 0.8 %
BILIRUB SERPL-MCNC: 3.7 MG/DL
BUN SERPL-MCNC: 16 MG/DL
CALCIUM SERPL-MCNC: 8.5 MG/DL
CHLORIDE SERPL-SCNC: 110 MMOL/L
CO2 SERPL-SCNC: 19 MMOL/L
CREAT SERPL-MCNC: 1.4 MG/DL
DIFFERENTIAL METHOD: ABNORMAL
EOSINOPHIL # BLD AUTO: 0.4 K/UL
EOSINOPHIL NFR BLD: 10.8 %
ERYTHROCYTE [DISTWIDTH] IN BLOOD BY AUTOMATED COUNT: 17.2 %
EST. GFR  (AFRICAN AMERICAN): >60 ML/MIN/1.73 M^2
EST. GFR  (NON AFRICAN AMERICAN): 53.5 ML/MIN/1.73 M^2
GLUCOSE SERPL-MCNC: 119 MG/DL
HCT VFR BLD AUTO: 25.6 %
HGB BLD-MCNC: 8.9 G/DL
IMM GRANULOCYTES # BLD AUTO: 0.02 K/UL
IMM GRANULOCYTES NFR BLD AUTO: 0.5 %
INR PPP: 1.7
LYMPHOCYTES # BLD AUTO: 0.6 K/UL
LYMPHOCYTES NFR BLD: 15.5 %
MAGNESIUM SERPL-MCNC: 1.6 MG/DL
MCH RBC QN AUTO: 34.1 PG
MCHC RBC AUTO-ENTMCNC: 34.8 G/DL
MCV RBC AUTO: 98 FL
MONOCYTES # BLD AUTO: 0.8 K/UL
MONOCYTES NFR BLD: 21.3 %
NEUTROPHILS # BLD AUTO: 2 K/UL
NEUTROPHILS NFR BLD: 51.1 %
NRBC BLD-RTO: 0 /100 WBC
PHOSPHATE SERPL-MCNC: 2.2 MG/DL
PLATELET # BLD AUTO: 41 K/UL
PMV BLD AUTO: 12.6 FL
POTASSIUM SERPL-SCNC: 3.7 MMOL/L
PROT SERPL-MCNC: 4.8 G/DL
PROTHROMBIN TIME: 16.7 SEC
RBC # BLD AUTO: 2.61 M/UL
SODIUM SERPL-SCNC: 136 MMOL/L
WBC # BLD AUTO: 3.81 K/UL

## 2018-06-16 PROCEDURE — 84100 ASSAY OF PHOSPHORUS: CPT | Mod: NTX

## 2018-06-16 PROCEDURE — G8989 SELF CARE D/C STATUS: HCPCS | Mod: CK,NTX

## 2018-06-16 PROCEDURE — 85610 PROTHROMBIN TIME: CPT | Mod: NTX

## 2018-06-16 PROCEDURE — 85025 COMPLETE CBC W/AUTO DIFF WBC: CPT | Mod: NTX

## 2018-06-16 PROCEDURE — 83735 ASSAY OF MAGNESIUM: CPT | Mod: NTX

## 2018-06-16 PROCEDURE — 25000003 PHARM REV CODE 250: Mod: NTX | Performed by: NURSE PRACTITIONER

## 2018-06-16 PROCEDURE — 25000003 PHARM REV CODE 250: Mod: NTX | Performed by: STUDENT IN AN ORGANIZED HEALTH CARE EDUCATION/TRAINING PROGRAM

## 2018-06-16 PROCEDURE — 63600175 PHARM REV CODE 636 W HCPCS: Mod: NTX | Performed by: NURSE PRACTITIONER

## 2018-06-16 PROCEDURE — 36415 COLL VENOUS BLD VENIPUNCTURE: CPT | Mod: NTX

## 2018-06-16 PROCEDURE — 99239 HOSP IP/OBS DSCHRG MGMT >30: CPT | Mod: NTX,,, | Performed by: PHYSICIAN ASSISTANT

## 2018-06-16 PROCEDURE — 80053 COMPREHEN METABOLIC PANEL: CPT | Mod: NTX

## 2018-06-16 RX ORDER — SODIUM BICARBONATE 650 MG/1
650 TABLET ORAL 2 TIMES DAILY
Qty: 60 TABLET | Refills: 11 | Status: ON HOLD | OUTPATIENT
Start: 2018-06-16 | End: 2018-08-17 | Stop reason: HOSPADM

## 2018-06-16 RX ORDER — CHOLESTYRAMINE 4 G/9G
1 POWDER, FOR SUSPENSION ORAL 2 TIMES DAILY
Qty: 180 PACKET | Refills: 3 | Status: CANCELLED | OUTPATIENT
Start: 2018-06-16 | End: 2019-06-16

## 2018-06-16 RX ADMIN — LEVETIRACETAM 1500 MG: 750 TABLET ORAL at 09:06

## 2018-06-16 RX ADMIN — FUROSEMIDE 60 MG: 10 INJECTION, SOLUTION INTRAMUSCULAR; INTRAVENOUS at 09:06

## 2018-06-16 RX ADMIN — SODIUM BICARBONATE 650 MG TABLET 650 MG: at 09:06

## 2018-06-16 RX ADMIN — LACTULOSE 30 G: 20 SOLUTION ORAL at 01:06

## 2018-06-16 RX ADMIN — PANTOPRAZOLE SODIUM 40 MG: 40 TABLET, DELAYED RELEASE ORAL at 05:06

## 2018-06-16 RX ADMIN — SPIRONOLACTONE 200 MG: 100 TABLET, FILM COATED ORAL at 09:06

## 2018-06-16 RX ADMIN — MICONAZOLE NITRATE: 20 POWDER TOPICAL at 09:06

## 2018-06-16 RX ADMIN — RIFAXIMIN 550 MG: 550 TABLET ORAL at 09:06

## 2018-06-16 RX ADMIN — LACTULOSE 30 G: 20 SOLUTION ORAL at 05:06

## 2018-06-16 RX ADMIN — Medication: at 09:06

## 2018-06-16 RX ADMIN — CHOLESTYRAMINE 4 G: 4 POWDER, FOR SUSPENSION ORAL at 09:06

## 2018-06-16 RX ADMIN — HEPARIN SODIUM 5000 UNITS: 5000 INJECTION, SOLUTION INTRAVENOUS; SUBCUTANEOUS at 05:06

## 2018-06-16 NOTE — NURSING
Patient discharged from unit via wheelchair, accompanied by transporter, wife, son and mother. Destined for home with home health care. All personal belongings taken home with patient.

## 2018-06-17 ENCOUNTER — TELEPHONE (OUTPATIENT)
Dept: TRANSPLANT | Facility: CLINIC | Age: 63
End: 2018-06-17

## 2018-06-18 LAB
BACTERIA SPEC ANAEROBE CULT: NORMAL
PHOSPHATIDYLETHANOL (PETH): NEGATIVE NG/ML

## 2018-06-18 NOTE — PT/OT/SLP DISCHARGE
Occupational Therapy Discharge Summary    Alon Adamson  MRN: [id number]   Principal Problem: Hepatic encephalopathy      Patient Discharged from acute Occupational Therapy on 6/16/2018.  Please refer to prior OT note dated 6/14/2018 for functional status.    Assessment:      Patient appropriate for care in another setting.    Objective:     GOALS:    Occupational Therapy Goals     Not on file          Multidisciplinary Problems (Resolved)        Problem: Occupational Therapy Goal    Goal Priority Disciplines Outcome Interventions   Occupational Therapy Goal   (Resolved)     OT, PT/OT Outcome(s) achieved    Description:  Goals to be met by: 6/26/18     Patient will increase functional independence with ADLs by performing:    Feeding with Modified [place].  UE Dressing with Supervision.  LE Dressing with Supervision.  Grooming while standing at sink with Supervision.  Toileting from toilet with Supervision for hygiene and clothing management.  -- Met (6/14)  Toilet transfer to toilet with Supervision.                       Reasons for Discontinuation of Therapy Services  Transfer to alternate level of care.      Plan:     Patient Discharged to: Home with Home Health Service    Prisca Badillo, OT  6/18/2018

## 2018-06-18 NOTE — TELEPHONE ENCOUNTER
Called patient post discharge from hospital yesterday.  Spoke to patient's wife.  Reports patient had some nausea today but it was relieved with zofran.  Otherwise feeling fine.  Patient's wife is aware of his appointments on Friday.

## 2018-06-19 ENCOUNTER — PATIENT OUTREACH (OUTPATIENT)
Dept: ADMINISTRATIVE | Facility: CLINIC | Age: 63
End: 2018-06-19

## 2018-06-19 NOTE — PATIENT INSTRUCTIONS
Discharge Instructions for Cirrhosis of the Liver  You have been diagnosed with cirrhosis of the liver. This is a long-term (chronic) problem. It occurs when liver tissue is destroyed and replaced by scar tissue. Causes of cirrhosis include:  · Infection such as viral hepatitis  · Chronic alcoholism  · The bodys immune system attacks healthy cells (autoimmune disorders)  · Obesity  · Medicine side effects  · Genetic diseases  Sometimes the exact cause is unknown. You may not have any symptoms at first. Or your symptoms may be mild. But they usually get worse. Cirrhosis is likely to occur if you have a history of long-term alcohol abuse. Cirrhosis cant be cured. But it can be treated.   Home care  Alcohol  · People with liver disease should not drink alcohol. If you stop drinking, you may feel better and live longer.  · If you are a chronic alcohol user, you will have withdrawal symptoms. Talk with your healthcare provider for more information.    · If alcohol is a problem, ask your provider about medicine that can help you quit drinking.    · Find a local Alcoholics Anonymous support group online at www.aa.org.  Diet  · Ask your provider what kind of diet you should follow. You may be asked to limit or not eat certain foods. Do not limit your protein intake.  · Weigh yourself daily and keep a weight log. If you have a sudden change in weight, call your provider.  · Cut back on salt:  ¨ Limit canned, dried, packaged, and fast foods.  ¨ Dont add salt to your food at the table.  ¨ Season foods with herbs instead of salt when you cook.  Medicines, supplements, and vaccines  · Take your medicines exactly as directed.  · Talk to your provider before taking vitamins, over-the-counter medicines, or herbal supplements. Many herbal supplements may be poisonous (toxic) to the liver.  · Avoid aspirin and other blood-thinning medicines.  · Discuss vitamin supplements and deficiencies with your provider.  · Ask your provider  about getting vaccinations for viruses that can cause liver diseases.  Follow-up care  Follow up with your healthcare provider, or as advised. You will likely have the following tests:  · Lab tests  · Blood tests for liver cancer  · Ultrasound of your liver every 6 months  · Endoscopy to check for swollen veins (varices) in your digestive tract  When to call your provider  Call your healthcare provider right away if you have any of the following:  · Fever of 100.4°F (38.0°C) or higher  · Extreme tiredness (fatigue), weakness, or lack of appetite  · Vomiting (with or without blood)  · Yellowing of your skin or eyes (jaundice)  · Itching  · Swelling in your belly or legs  · Black or tarry stools  · Skin that bruises easily  · Confusion or trouble thinking clearly   Date Last Reviewed: 8/1/2016  © 4481-0548 [employer]. [address] [place] [address], [place] [address]. All rights reserved. This information is not intended as a substitute for professional medical care. Always follow your healthcare professional's instructions.

## 2018-06-22 ENCOUNTER — LAB VISIT (OUTPATIENT)
Dept: LAB | Facility: HOSPITAL | Age: 63
End: 2018-06-22
Attending: INTERNAL MEDICINE
Payer: COMMERCIAL

## 2018-06-22 ENCOUNTER — OFFICE VISIT (OUTPATIENT)
Dept: TRANSPLANT | Facility: CLINIC | Age: 63
End: 2018-06-22
Payer: MEDICARE

## 2018-06-22 ENCOUNTER — HOSPITAL ENCOUNTER (INPATIENT)
Facility: HOSPITAL | Age: 63
LOS: 56 days | Discharge: NURSING, REHAB OR LONG-TERM CARE | DRG: 005 | End: 2018-08-17
Attending: TRANSPLANT SURGERY | Admitting: TRANSPLANT SURGERY
Payer: MEDICARE

## 2018-06-22 ENCOUNTER — TELEPHONE (OUTPATIENT)
Dept: TRANSPLANT | Facility: CLINIC | Age: 63
End: 2018-06-22

## 2018-06-22 VITALS
HEART RATE: 96 BPM | SYSTOLIC BLOOD PRESSURE: 132 MMHG | DIASTOLIC BLOOD PRESSURE: 64 MMHG | TEMPERATURE: 98 F | RESPIRATION RATE: 18 BRPM | HEIGHT: 74 IN | WEIGHT: 306.44 LBS | BODY MASS INDEX: 39.33 KG/M2 | OXYGEN SATURATION: 97 %

## 2018-06-22 DIAGNOSIS — N18.30 CKD (CHRONIC KIDNEY DISEASE), STAGE III: ICD-10-CM

## 2018-06-22 DIAGNOSIS — I25.10 CORONARY ARTERY DISEASE INVOLVING NATIVE CORONARY ARTERY OF NATIVE HEART WITHOUT ANGINA PECTORIS: ICD-10-CM

## 2018-06-22 DIAGNOSIS — N18.9 ACUTE KIDNEY INJURY SUPERIMPOSED ON CHRONIC KIDNEY DISEASE: ICD-10-CM

## 2018-06-22 DIAGNOSIS — Z00.5 POSITIVE BLOOD CULTURE IN CADAVERIC DONOR: ICD-10-CM

## 2018-06-22 DIAGNOSIS — R73.9 STEROID-INDUCED HYPERGLYCEMIA: ICD-10-CM

## 2018-06-22 DIAGNOSIS — K70.31 ASCITES DUE TO ALCOHOLIC CIRRHOSIS: ICD-10-CM

## 2018-06-22 DIAGNOSIS — D70.9 NEUTROPENIA, UNSPECIFIED TYPE: ICD-10-CM

## 2018-06-22 DIAGNOSIS — T38.0X5A STEROID-INDUCED HYPERGLYCEMIA: ICD-10-CM

## 2018-06-22 DIAGNOSIS — E87.5 HYPERKALEMIA: ICD-10-CM

## 2018-06-22 DIAGNOSIS — R00.1 SINUS BRADYCARDIA: ICD-10-CM

## 2018-06-22 DIAGNOSIS — K74.60 DECOMPENSATED HEPATIC CIRRHOSIS: ICD-10-CM

## 2018-06-22 DIAGNOSIS — E87.70 HYPERVOLEMIA, UNSPECIFIED HYPERVOLEMIA TYPE: ICD-10-CM

## 2018-06-22 DIAGNOSIS — E46 HYPOALBUMINEMIA DUE TO PROTEIN-CALORIE MALNUTRITION: ICD-10-CM

## 2018-06-22 DIAGNOSIS — K70.31 ALCOHOLIC CIRRHOSIS OF LIVER WITH ASCITES: Primary | ICD-10-CM

## 2018-06-22 DIAGNOSIS — R56.9 SEIZURES: ICD-10-CM

## 2018-06-22 DIAGNOSIS — R60.1 GENERALIZED EDEMA: ICD-10-CM

## 2018-06-22 DIAGNOSIS — E87.70 HYPERVOLEMIA: ICD-10-CM

## 2018-06-22 DIAGNOSIS — R79.89 ELEVATED TROPONIN: ICD-10-CM

## 2018-06-22 DIAGNOSIS — R53.81 PHYSICAL DECONDITIONING: ICD-10-CM

## 2018-06-22 DIAGNOSIS — R78.81 POSITIVE BLOOD CULTURE IN CADAVERIC DONOR: ICD-10-CM

## 2018-06-22 DIAGNOSIS — Z94.4 LIVER TRANSPLANTED: ICD-10-CM

## 2018-06-22 DIAGNOSIS — R11.2 NON-INTRACTABLE VOMITING WITH NAUSEA, UNSPECIFIED VOMITING TYPE: ICD-10-CM

## 2018-06-22 DIAGNOSIS — E87.0 HYPERNATREMIA: ICD-10-CM

## 2018-06-22 DIAGNOSIS — L30.8 DERMATITIS ASSOCIATED WITH MOISTURE: ICD-10-CM

## 2018-06-22 DIAGNOSIS — E88.09 HYPOALBUMINEMIA DUE TO PROTEIN-CALORIE MALNUTRITION: ICD-10-CM

## 2018-06-22 DIAGNOSIS — R60.0 BILATERAL EDEMA OF LOWER EXTREMITY: ICD-10-CM

## 2018-06-22 DIAGNOSIS — Z29.89 PROPHYLACTIC IMMUNOTHERAPY: ICD-10-CM

## 2018-06-22 DIAGNOSIS — L89.152 STAGE II PRESSURE ULCER OF SACRAL REGION: ICD-10-CM

## 2018-06-22 DIAGNOSIS — N17.9 ACUTE KIDNEY INJURY SUPERIMPOSED ON CHRONIC KIDNEY DISEASE: ICD-10-CM

## 2018-06-22 DIAGNOSIS — N17.9 AKI (ACUTE KIDNEY INJURY): ICD-10-CM

## 2018-06-22 DIAGNOSIS — D63.8 ANEMIA OF CHRONIC DISEASE: ICD-10-CM

## 2018-06-22 DIAGNOSIS — R00.1 BRADYCARDIA: ICD-10-CM

## 2018-06-22 DIAGNOSIS — L29.8 CHOLESTATIC PRURITUS: ICD-10-CM

## 2018-06-22 DIAGNOSIS — K70.31 ALCOHOLIC CIRRHOSIS OF LIVER WITH ASCITES: ICD-10-CM

## 2018-06-22 DIAGNOSIS — Z91.89 AT RISK FOR OPPORTUNISTIC INFECTIONS: ICD-10-CM

## 2018-06-22 DIAGNOSIS — R23.9 ALTERATION IN SKIN INTEGRITY: ICD-10-CM

## 2018-06-22 DIAGNOSIS — I87.2 ACUTE VENOUS STASIS DERMATITIS OF BOTH LEGS: ICD-10-CM

## 2018-06-22 DIAGNOSIS — R07.9 CHEST PAIN: ICD-10-CM

## 2018-06-22 DIAGNOSIS — E87.20 ACIDOSIS: ICD-10-CM

## 2018-06-22 DIAGNOSIS — L03.116 CELLULITIS OF BOTH LOWER EXTREMITIES: ICD-10-CM

## 2018-06-22 DIAGNOSIS — K76.82 HEPATIC ENCEPHALOPATHY: ICD-10-CM

## 2018-06-22 DIAGNOSIS — G40.909 SEIZURE DISORDER: ICD-10-CM

## 2018-06-22 DIAGNOSIS — D69.6 THROMBOCYTOPENIA: ICD-10-CM

## 2018-06-22 DIAGNOSIS — D68.9 COAGULOPATHY: ICD-10-CM

## 2018-06-22 DIAGNOSIS — R11.2 NAUSEA AND VOMITING, INTRACTABILITY OF VOMITING NOT SPECIFIED, UNSPECIFIED VOMITING TYPE: ICD-10-CM

## 2018-06-22 DIAGNOSIS — Z79.60 LONG-TERM USE OF IMMUNOSUPPRESSANT MEDICATION: ICD-10-CM

## 2018-06-22 DIAGNOSIS — R78.81 GRAM-POSITIVE BACTEREMIA: ICD-10-CM

## 2018-06-22 DIAGNOSIS — K72.90 DECOMPENSATED HEPATIC CIRRHOSIS: ICD-10-CM

## 2018-06-22 DIAGNOSIS — T68.XXXD HYPOTHERMIA, SUBSEQUENT ENCOUNTER: ICD-10-CM

## 2018-06-22 DIAGNOSIS — L03.115 CELLULITIS OF BOTH LOWER EXTREMITIES: ICD-10-CM

## 2018-06-22 DIAGNOSIS — E43 SEVERE PROTEIN-CALORIE MALNUTRITION: ICD-10-CM

## 2018-06-22 DIAGNOSIS — D61.818 PANCYTOPENIA: ICD-10-CM

## 2018-06-22 PROBLEM — R94.01 ELECTROENCEPHALOGRAM (EEG) ABNORMALITY WITHOUT SEIZURE: Status: RESOLVED | Noted: 2018-06-14 | Resolved: 2018-06-22

## 2018-06-22 LAB
ALBUMIN SERPL BCP-MCNC: 2.4 G/DL
ALBUMIN SERPL BCP-MCNC: 2.7 G/DL
ALP SERPL-CCNC: 80 U/L
ALP SERPL-CCNC: 81 U/L
ALT SERPL W/O P-5'-P-CCNC: 24 U/L
ALT SERPL W/O P-5'-P-CCNC: 25 U/L
ANION GAP SERPL CALC-SCNC: 8 MMOL/L
ANION GAP SERPL CALC-SCNC: 9 MMOL/L
AST SERPL-CCNC: 28 U/L
AST SERPL-CCNC: 30 U/L
BASOPHILS # BLD AUTO: 0.07 K/UL
BASOPHILS # BLD AUTO: 0.07 K/UL
BASOPHILS NFR BLD: 0.9 %
BASOPHILS NFR BLD: 1 %
BILIRUB SERPL-MCNC: 2.2 MG/DL
BILIRUB SERPL-MCNC: 2.4 MG/DL
BUN SERPL-MCNC: 17 MG/DL
BUN SERPL-MCNC: 18 MG/DL
CALCIUM SERPL-MCNC: 8.9 MG/DL
CALCIUM SERPL-MCNC: 9.3 MG/DL
CHLORIDE SERPL-SCNC: 107 MMOL/L
CHLORIDE SERPL-SCNC: 108 MMOL/L
CO2 SERPL-SCNC: 18 MMOL/L
CO2 SERPL-SCNC: 18 MMOL/L
CREAT SERPL-MCNC: 2.3 MG/DL
CREAT SERPL-MCNC: 2.4 MG/DL
DIFFERENTIAL METHOD: ABNORMAL
DIFFERENTIAL METHOD: ABNORMAL
EOSINOPHIL # BLD AUTO: 0.6 K/UL
EOSINOPHIL # BLD AUTO: 0.6 K/UL
EOSINOPHIL NFR BLD: 7.9 %
EOSINOPHIL NFR BLD: 8.3 %
ERYTHROCYTE [DISTWIDTH] IN BLOOD BY AUTOMATED COUNT: 17.2 %
ERYTHROCYTE [DISTWIDTH] IN BLOOD BY AUTOMATED COUNT: 17.5 %
EST. GFR  (AFRICAN AMERICAN): 32.2 ML/MIN/1.73 M^2
EST. GFR  (AFRICAN AMERICAN): 33.9 ML/MIN/1.73 M^2
EST. GFR  (NON AFRICAN AMERICAN): 27.9 ML/MIN/1.73 M^2
EST. GFR  (NON AFRICAN AMERICAN): 29.3 ML/MIN/1.73 M^2
GLUCOSE SERPL-MCNC: 121 MG/DL
GLUCOSE SERPL-MCNC: 178 MG/DL
HCT VFR BLD AUTO: 27.4 %
HCT VFR BLD AUTO: 28 %
HGB BLD-MCNC: 9.2 G/DL
HGB BLD-MCNC: 9.5 G/DL
IMM GRANULOCYTES # BLD AUTO: 0.03 K/UL
IMM GRANULOCYTES # BLD AUTO: 0.03 K/UL
IMM GRANULOCYTES NFR BLD AUTO: 0.4 %
IMM GRANULOCYTES NFR BLD AUTO: 0.4 %
INR PPP: 1.8
LYMPHOCYTES # BLD AUTO: 0.8 K/UL
LYMPHOCYTES # BLD AUTO: 0.9 K/UL
LYMPHOCYTES NFR BLD: 11.1 %
LYMPHOCYTES NFR BLD: 12.3 %
MCH RBC QN AUTO: 33.1 PG
MCH RBC QN AUTO: 33.7 PG
MCHC RBC AUTO-ENTMCNC: 32.9 G/DL
MCHC RBC AUTO-ENTMCNC: 34.7 G/DL
MCV RBC AUTO: 101 FL
MCV RBC AUTO: 97 FL
MONOCYTES # BLD AUTO: 0.9 K/UL
MONOCYTES # BLD AUTO: 1.2 K/UL
MONOCYTES NFR BLD: 13.9 %
MONOCYTES NFR BLD: 15.7 %
NEUTROPHILS # BLD AUTO: 4.4 K/UL
NEUTROPHILS # BLD AUTO: 4.6 K/UL
NEUTROPHILS NFR BLD: 62.8 %
NEUTROPHILS NFR BLD: 65.3 %
NRBC BLD-RTO: 0 /100 WBC
NRBC BLD-RTO: 0 /100 WBC
PLATELET # BLD AUTO: 73 K/UL
PLATELET # BLD AUTO: 87 K/UL
PMV BLD AUTO: 12 FL
PMV BLD AUTO: 12.5 FL
POTASSIUM SERPL-SCNC: 3.8 MMOL/L
POTASSIUM SERPL-SCNC: 3.9 MMOL/L
PROT SERPL-MCNC: 5.2 G/DL
PROT SERPL-MCNC: 5.6 G/DL
PROTHROMBIN TIME: 18.1 SEC
RBC # BLD AUTO: 2.78 M/UL
RBC # BLD AUTO: 2.82 M/UL
SODIUM SERPL-SCNC: 134 MMOL/L
SODIUM SERPL-SCNC: 134 MMOL/L
SODIUM UR-SCNC: 32 MMOL/L
WBC # BLD AUTO: 6.75 K/UL
WBC # BLD AUTO: 7.37 K/UL

## 2018-06-22 PROCEDURE — 85025 COMPLETE CBC W/AUTO DIFF WBC: CPT | Mod: TXP

## 2018-06-22 PROCEDURE — 63600175 PHARM REV CODE 636 W HCPCS: Mod: JG,NTX | Performed by: PHYSICIAN ASSISTANT

## 2018-06-22 PROCEDURE — 99213 OFFICE O/P EST LOW 20 MIN: CPT | Mod: PBBFAC,TXP,25 | Performed by: NURSE PRACTITIONER

## 2018-06-22 PROCEDURE — 80053 COMPREHEN METABOLIC PANEL: CPT | Mod: 91,NTX

## 2018-06-22 PROCEDURE — 36415 COLL VENOUS BLD VENIPUNCTURE: CPT | Mod: TXP

## 2018-06-22 PROCEDURE — 99214 OFFICE O/P EST MOD 30 MIN: CPT | Mod: NTX,S$GLB,, | Performed by: NURSE PRACTITIONER

## 2018-06-22 PROCEDURE — 20600001 HC STEP DOWN PRIVATE ROOM: Mod: NTX

## 2018-06-22 PROCEDURE — 85610 PROTHROMBIN TIME: CPT | Mod: TXP

## 2018-06-22 PROCEDURE — 85025 COMPLETE CBC W/AUTO DIFF WBC: CPT | Mod: 91,NTX

## 2018-06-22 PROCEDURE — 36415 COLL VENOUS BLD VENIPUNCTURE: CPT | Mod: NTX

## 2018-06-22 PROCEDURE — 84300 ASSAY OF URINE SODIUM: CPT | Mod: NTX

## 2018-06-22 PROCEDURE — 25000003 PHARM REV CODE 250: Mod: NTX | Performed by: PHYSICIAN ASSISTANT

## 2018-06-22 PROCEDURE — 99999 PR PBB SHADOW E&M-EST. PATIENT-LVL III: CPT | Mod: PBBFAC,TXP,, | Performed by: NURSE PRACTITIONER

## 2018-06-22 PROCEDURE — P9047 ALBUMIN (HUMAN), 25%, 50ML: HCPCS | Mod: JG,NTX | Performed by: PHYSICIAN ASSISTANT

## 2018-06-22 PROCEDURE — 80053 COMPREHEN METABOLIC PANEL: CPT | Mod: TXP

## 2018-06-22 PROCEDURE — 3008F BODY MASS INDEX DOCD: CPT | Mod: NTX,S$GLB,, | Performed by: NURSE PRACTITIONER

## 2018-06-22 RX ORDER — FUROSEMIDE 10 MG/ML
60 INJECTION INTRAMUSCULAR; INTRAVENOUS ONCE
Status: COMPLETED | OUTPATIENT
Start: 2018-06-22 | End: 2018-06-22

## 2018-06-22 RX ORDER — SODIUM CHLORIDE 0.9 % (FLUSH) 0.9 %
3 SYRINGE (ML) INJECTION
Status: DISCONTINUED | OUTPATIENT
Start: 2018-06-22 | End: 2018-08-17 | Stop reason: HOSPADM

## 2018-06-22 RX ORDER — HEPARIN SODIUM 5000 [USP'U]/ML
5000 INJECTION, SOLUTION INTRAVENOUS; SUBCUTANEOUS EVERY 8 HOURS
Status: DISCONTINUED | OUTPATIENT
Start: 2018-06-22 | End: 2018-07-22

## 2018-06-22 RX ORDER — RAMELTEON 8 MG/1
8 TABLET ORAL NIGHTLY PRN
Status: DISCONTINUED | OUTPATIENT
Start: 2018-06-22 | End: 2018-06-23

## 2018-06-22 RX ORDER — ALBUMIN HUMAN 250 G/1000ML
25 SOLUTION INTRAVENOUS EVERY 8 HOURS
Status: COMPLETED | OUTPATIENT
Start: 2018-06-22 | End: 2018-06-23

## 2018-06-22 RX ORDER — ONDANSETRON 8 MG/1
8 TABLET, ORALLY DISINTEGRATING ORAL EVERY 8 HOURS PRN
Status: DISCONTINUED | OUTPATIENT
Start: 2018-06-22 | End: 2018-07-15

## 2018-06-22 RX ORDER — LEVETIRACETAM 500 MG/1
500 TABLET ORAL 2 TIMES DAILY
Status: DISCONTINUED | OUTPATIENT
Start: 2018-06-22 | End: 2018-06-26

## 2018-06-22 RX ORDER — ACETAMINOPHEN 325 MG/1
650 TABLET ORAL EVERY 8 HOURS PRN
Status: DISCONTINUED | OUTPATIENT
Start: 2018-06-22 | End: 2018-07-26

## 2018-06-22 RX ORDER — LANOLIN ALCOHOL/MO/W.PET/CERES
400 CREAM (GRAM) TOPICAL DAILY
Status: DISCONTINUED | OUTPATIENT
Start: 2018-06-23 | End: 2018-06-26

## 2018-06-22 RX ORDER — PANTOPRAZOLE SODIUM 40 MG/1
40 TABLET, DELAYED RELEASE ORAL
Status: DISCONTINUED | OUTPATIENT
Start: 2018-06-23 | End: 2018-06-23

## 2018-06-22 RX ORDER — LACTULOSE 10 G/15ML
15 SOLUTION ORAL 3 TIMES DAILY
Status: DISCONTINUED | OUTPATIENT
Start: 2018-06-22 | End: 2018-06-24

## 2018-06-22 RX ADMIN — ALBUMIN HUMAN 25 G: 0.25 SOLUTION INTRAVENOUS at 08:06

## 2018-06-22 RX ADMIN — HEPARIN SODIUM 5000 UNITS: 5000 INJECTION, SOLUTION INTRAVENOUS; SUBCUTANEOUS at 08:06

## 2018-06-22 RX ADMIN — LACTULOSE 15 G: 20 SOLUTION ORAL at 08:06

## 2018-06-22 RX ADMIN — RIFAXIMIN 550 MG: 550 TABLET ORAL at 08:06

## 2018-06-22 RX ADMIN — FUROSEMIDE 60 MG: 10 INJECTION, SOLUTION INTRAMUSCULAR; INTRAVENOUS at 08:06

## 2018-06-22 RX ADMIN — LEVETIRACETAM 500 MG: 500 TABLET ORAL at 08:06

## 2018-06-22 NOTE — PROGRESS NOTES
[facility] Established Patient Visit    Reason for Visit: Hospital follow-up     PCP: Mckinley Vides    HPI:  This is a 63 y.o. male here for hospital follow-up. Mr. Adamson has cirrhosis 2/2 alcohol, currently listed for liver transplant with MELD 23. He was recently hospitalized at [facility] (6/9 - 6/16) after being transferred from outside facility with worsening hepatic encephalopathy. His son reports he was unresponsive and emergently intubated at outside hospital prior to transfer. Patient reports he was taking his lactulose and xifaxan as prescribed, having 3-4 BMs daily. Family did not notice that encephalopathy was worsening. Inpatient infectious workup - diagnostic para with no evidence of SBP; blood and urine cultures with NGTD. EEG (6/10) with intermittent continuous generalized epileptiform discharges. Loaded with Keppra and BID dosing continued. Extubated 6/11. PETH negative 6/11/18.      He has also had persistent issues with fluid retention requiring high dose diuretics. Discharged on lasix 80 mg BID and spironolactone 200 mg BID. Creatinine 1.4 at discharge (baseline 1.3-1.6).     Interval history:           Here today with his son and friend. Fluid retention has greatly worsened since discharge. BLE weeping and significant edema present. Also with worsening ascites and likely needs a repeat paracentesis. Last para 1/2018 with 3.4L removed. VICKY also noted on labs today, creatinine increased from 1.4 to 2.3. Denies any symptoms of hepatic encephalopathy. Has been taking his lactulose and xifaxan without missing doses. Still having persistent itching and has noticed a red rash developing on his skin.                      Cirrhosis Health Maintenance:  -- HCC screening: CT abd pelvis (3/2018) with subcm focal hypodensity (too small to characterize), no other focal hepatic lesions. AFP 5.3.    -- Variceal screening: EGD 1/25/2018 with small varices  -- Hepatitis A & B vaccination: vaccines  complete       ROS:   GENERAL: Denies fever, chills, weight loss/gain, (+) fatigue, weakness  HEENT: Denies headaches, dizziness, vision/hearing changes  CARDIOVASCULAR: Denies chest pain, palpitations. (+) worsening leg swelling  RESPIRATORY: Denies dyspnea, cough  GI: Denies abdominal pain, rectal bleeding, nausea, vomiting. No change in bowel pattern or color. (+) abdominal fluid retention  : Denies dysuria, hematuria   SKIN: (+) rash, itching   NEURO: Denies confusion, memory loss, or mood changes  PSYCH: Denies depression or anxiety  HEME/LYMPH: (+) easy bruising or bleeding      PMHX:  has a past medical history of Anticoagulant long-term use; Arthritis; Back pain; Cellulitis; Cirrhosis; Coronary artery disease; DM (diabetes mellitus); Hearing loss; Hepatic encephalopathy; Hypertension; Leg edema; Nephrolithiasis; JACK (obstructive sleep apnea); Seizures; and Splenomegaly.    PSHX:  has a past surgical history that includes Cholecystectomy; Appendectomy; Tonsillectomy; Lumbar discectomy; Splenic artery embolization (04/08/2016); Back surgery; and Colonoscopy (N/A, 1/25/2018).    The patient's social and family histories were reviewed by me and updated in the appropriate section of the electronic medical record.    Review of patient's allergies indicates:   Allergen Reactions    Nsaids (non-steroidal anti-inflammatory drug)      D/t to liver disease.    Tylenol [acetaminophen]      D/t liver disease.    Penicillins Other (See Comments)     Since childhood was told not to take it.     Current Outpatient Prescriptions on File Prior to Visit   Medication Sig Dispense Refill    ascorbic acid (VITAMIN C) 1000 MG tablet Take 1,000 mg by mouth once daily.      aspirin (ECOTRIN) 81 MG EC tablet Take 1 tablet (81 mg total) by mouth once daily. 90 tablet 3    atorvastatin (LIPITOR) 40 MG tablet TAKE 1 TABLET(40 MG) BY MOUTH EVERY DAY 30 tablet 0    docusate sodium (COLACE) 100 MG capsule Take 100 mg by mouth once  "daily.       ferrous gluconate 270 mg (27 mg iron) Tab Take 1 tablet by mouth once daily.      fish oil-omega-3 fatty acids 300-1,000 mg capsule Take 2 capsules (2 g total) by mouth once daily. (Patient taking differently: Take 2 g by mouth 2 (two) times daily. )      furosemide (LASIX) 80 MG tablet Take 1 tablet (80 mg total) by mouth 2 (two) times daily. 60 tablet 2    GENERLAC 10 gram/15 mL solution Take 60 mLs (40 g total) by mouth 3 (three) times daily. 60mg three times per day (Patient taking differently: Take 60 mLs by mouth 3 (three) times daily. ) 5400 mL 6    levETIRAcetam (KEPPRA) 500 MG Tab Take 1 tablet (500 mg total) by mouth 2 (two) times daily. 180 tablet 1    magnesium oxide (MAG-OX) 400 mg tablet Take 1 tablet (400 mg total) by mouth once daily.  0    ondansetron (ZOFRAN) 4 MG tablet Take 1 tablet (4 mg total) by mouth every 8 (eight) hours as needed for Nausea. 15 tablet 3    pantoprazole (PROTONIX) 40 MG tablet Take 1 tablet (40 mg total) by mouth 2 (two) times daily before meals. 60 tablet 11    rifAXIMin (XIFAXAN) 550 mg Tab Take 1 tablet (550 mg total) by mouth 2 (two) times daily. 60 tablet 5    sodium bicarbonate 650 MG tablet Take 1 tablet (650 mg total) by mouth 2 (two) times daily. 60 tablet 11    spironolactone (ALDACTONE) 100 MG tablet Take 2 tablets (200 mg total) by mouth 2 (two) times daily. 60 tablet 2    tramadol (ULTRAM) 50 mg tablet Take 50 mg by mouth every 6 (six) hours as needed for Pain.       No current facility-administered medications on file prior to visit.        Objective Findings:    PHYSICAL EXAM:   Friendly White male, in no acute distress; alert and oriented to person, place and time  VITALS:   /64 (BP Location: Right arm, Patient Position: Sitting, BP Method: Large (Automatic))   Pulse 96   Temp 98.3 °F (36.8 °C) (Oral)   Resp 18   Ht 6' 2" (1.88 m)   Wt (!) 139 kg (306 lb 7 oz)   SpO2 97%   BMI 39.34 kg/m²  Shivering (denies any fevers at " home and afebrile in clinic).   HENT: Normocephalic, without obvious abnormality. Oral mucosa pink and moist. Temporal muscle wasting.    EYES: Sclerae anicteric.   NECK: Supple. No masses or cervical adenopathy.  CARDIOVASCULAR: Regular rate and rhythm. No murmurs.  RESPIRATORY: Normal respiratory effort. BBS CTA. No wheezes or crackles.  GI: Non-tender. (+) abdominal distention, moderate ascites. No palpable hepatosplenomegaly. No masses palpable.   EXTREMITIES:  No clubbing, cyanosis. +3-4 pitting edema to BLE, legs hardened and weeping. Upper extremities red and warm.    SKIN: Warm and dry. No jaundice. No telangectasias noted. No palmar erythema. Redness to BUE.   NEURO:  In wheelchair, gait not assessed. No asterixis.  PSYCH:  Memory intact. Thought and speech pattern appropriate. Behavior normal. No depression or anxiety noted.      Labs:  Lab Results   Component Value Date    WBC 6.75 06/22/2018    HGB 9.2 (L) 06/22/2018    HCT 28.0 (L) 06/22/2018    PLT 73 (L) 06/22/2018     (L) 06/22/2018    K 3.8 06/22/2018    CREATININE 2.3 (H) 06/22/2018    ALT 25 06/22/2018    AST 28 06/22/2018    ALKPHOS 81 06/22/2018    BILITOT 2.2 (H) 06/22/2018    ALBUMIN 2.4 (L) 06/22/2018    INR 1.8 (H) 06/22/2018    AFP 5.3 04/21/2017       Diagnostic Studies  EGD - 1/25/2018  Findings:       Small (< 5 mm) varices were found in the lower third of the        esophagus.       Striped mildly erythematous mucosa was found in the stomach.       The cardia and gastric fundus were normal on retroflexion.       The examined duodenum was normal.  Impression:   - Small (< 5 mm) esophageal varices.                        - Erythematous mucosa in the stomach.                        - Normal examined duodenum.                        - No specimens collected.    CT abdomen w/ contrast - 3/9/18  Findings:    The lung bases demonstrate trace left pleural fluid with minimal associated compressive atelectasis.  Fine pulmonary parenchymal  detail is obscured by motion artifact. No focal consolidation, mass, pleural thickening, or pneumothorax.  No cardiomegaly.  No significant pericardial effusion. There is significant coronary artery atherosclerotic calcification.  There is calcification in the region of the mitral valve.    Liver is small and nodular in contour with homogenous attenuation.  There is a subcentimeter focal hypodensity in the medial right hepatic lobe which is too small to definitely characterize.  No other focal lesion or evidence of arterial hyperenhancement.  The gallbladder is surgically absent.  There is no intra-or extrahepatic biliary ductal dilatation.    The stomach, pancreas, and adrenal glands are unremarkable.  The spleen is enlarged.  There are numerous enlarged collateral venous vessels throughout the upper abdomen.  There are coiled packing in the left upper quadrant.    The kidneys are normal in size and location and concentrate and excrete contrast properly on delayed imaging.  Bilateral nonobstructing nephrolithiasis, unchanged when compared to recent prior examination.  There is one stone in the left kidney measuring 1.9 cm.  There are few stones in the right kidney with the largest measuring 1.0 cm.  There is no evidence of hydronephrosis.  The visualized ureters are unremarkable.  There are multiple bilateral renal hypodensities that are too small to definitely characterize.     The abdominal aorta is normal in course and caliber with significant atherosclerotic calcifications including its branch vessels.  The main portal vein, and right/left branches are patent, however small in caliber.  The main portal vein measures approximately 0.7 cm in transverse diameter.      The visualized loops of small large bowel show no evidence of obstruction or inflammation.  There is a small amount of scattered abdominal ascites.  No free air. There is no evidence of lymph node enlargement in the abdomen.    The osseous structures  demonstrate significant degenerative joint disease, however no evidence of acute fracture or aggressive lesion.  The extraperitoneal soft tissues demonstrate mild anasarca.   Impression       Hepatic cirrhosis with sequelae of portal hypertension such as splenomegaly, abdominal ascites, and enlarged upper abdominal venous collateral vessels.    Stable bilateral nonobstructing nephrolithiasis.    Trace left pleural effusion.    Additional findings as above.       ASSESSMENT:  63 y.o. White male with:  1. Decompensated alcoholic cirrhosis  -- MELD-Na score: 26 at 6/22/2018 10:44 AM  MELD score: 24 at 6/22/2018 10:44 AM  Calculated from:  Serum Creatinine: 2.3 mg/dL at 6/22/2018 10:44 AM  Serum Sodium: 134 mmol/L at 6/22/2018 10:44 AM  Total Bilirubin: 2.2 mg/dL at 6/22/2018 10:44 AM  INR(ratio): 1.8 at 6/22/2018 10:44 AM  Age: 63 years  -- HCC screening: CT abd pelvis (3/2018) with subcm focal hypodensity (too small to characterize), no other focal hepatic lesions. AFP 5.3.    -- Variceal screening: EGD 1/25/2018 with small varices  -- Hepatitis A & B vaccination: vaccines complete     2. Anasarca - ascites and lower extremity edema   -- Fluid retention not well controlled and kidneys not tolerating high dose diuretics     3. Esophageal varices  -- EGD 1/25/2018 with small varices     4. Hepatic encephalopathy  -- Well controlled on lactulose and xifaxan    5. Anemia  -- H/H stable: 9.2/28    6. VICKY on CKD 3  -- Creatinine increased from 1.4 to 2.3 today    7. Protein-calorie malnutrition  -- Drinking protein shakes daily; albumin 2.4      8. Seizures  -- Remains on keppra 500 mg BID      EDUCATION / DISCUSSION:   The disease process and manifestations of cirrhosis were discussed. Signs and symptoms of hepatic decompensation were reviewed, including jaundice, ascites, and slowed mentation due to hepatic encephalopathy. The patient should seek medical attention if any of these things occur.  We discussed the potential  for bleeding from esophageal varices with symptoms of hematemesis and melena. The patient should report to the Emergency Department for these symptoms.    Cirrhosis Counseling  -- Strict abstinence from alcohol  -- Avoid non-steroidal anti-inflammatory drugs (NSAIDs) such as ibuprofen (Motrin, Advil), naproxen (Naprosyn, Aleve)  -- Tylenol/acetaminophen as needed for pain, no more than 2000 mg per day  -- No raw seafood due to the risk of infection  -- Low sodium diet, less than 2000 mg per day   -- Daily protein intake of 1.2-1.5 gram protein / kg body weight per day to prevent muscle mass loss  -- Upper endoscopy every 1-2 years to screen for varices (enlarged blood vessels) in the stomach and esophagus which can burst and cause fatal bleeding  -- Increased risk of hepatocellular carcinoma (liver cancer) due to cirrhosis. Continued screening every 6 months with ultrasound and AFP  -- Compliance with lactulose and rifaximin for hepatic encephalopathy (confusion due to high ammonia level)       PLAN:  Reviewed with Dr. Wen. Patient to be directly admitted to LTS for volume overload and VICKY. Will need inpatient diuresis. Transplant coordinator and inpatient transplant JOANNE notified. Patient transported to admit office per CRISELDA Harvey MA. Patient's son and friend present as well. RTC for PDOL appointment post hospital discharge.             Thank you for allowing me to participate in the care of Alon Hernadez, SOHAP-C  Hepatology and Liver Transplant

## 2018-06-22 NOTE — HPI
62yo AAM with hx of ESLD secondary to EtOH cirrhosis with severe complications (hyperbilirubinemia, hypoalbuminemia, severe malnutrition, hepatic encephalopathy, thrombocytopenia, splenomegaly, ascites, hypervolemia, coaguloopathy, portal HTN), seizure disorder on Keppra, CKD3, CAD.     Patient was placed on transplant list on 6/1/2018.  He has a recent hx of transfer admission on 6/9 for severe hepatic encephalopathy requiring intubation which resolved.     Patient was admitted on 6/22/2018 from clinic for significant hypervolemia, weeping from BLE, and VICKY on CKD3 on routine labs.  His MELD increased from 22 to 26 per labs- coordinator notified and MELD updated.  Cr elevated to 2.3 from baseline of 1.4.  Also with worsening ascites. Pt reported increased pruritis, generalized fatigue and weakness.  He presented in wheelchair.  ROS otherwise negative.      KTM service consulted to assess suitability for combined liver/kidney transplant.

## 2018-06-22 NOTE — ASSESSMENT & PLAN NOTE
- Admit to liver txp service  - Unclear source  - Start HRS protocol until ruled in/out: albumin + octreotide + midodrine  - Urine Na sent at admit

## 2018-06-22 NOTE — PROGRESS NOTES
Patient arrived to room by wheelchair from admit office. Family present. NAN Ji at bedside. Patient with SOB with moving from wheelchair to bed; patient reports weak feeling. Generalized +3 edema. Bilateral lower extremity edema +4 with weeping, plan for wound care consult. Right upper arm 20g PIV placed. Patient just left floor for abdominal ultrasound.

## 2018-06-22 NOTE — SUBJECTIVE & OBJECTIVE
Past Medical History:   Diagnosis Date    Anticoagulant long-term use     Arthritis     Back pain     Cellulitis     Cirrhosis     Coronary artery disease     DM (diabetes mellitus)     Hearing loss     right ear    Hepatic encephalopathy     Hypertension     diagnosed today (11/25/2015) and prescribed toprol    Leg edema     Nephrolithiasis     JACK (obstructive sleep apnea)     Seizures     Splenomegaly        Past Surgical History:   Procedure Laterality Date    APPENDECTOMY      Open    BACK SURGERY      CHOLECYSTECTOMY      laprascopic    COLONOSCOPY N/A 1/25/2018    Procedure: COLONOSCOPY;  Surgeon: Lashawn Myles MD;  Location: [address]);  Service: Endoscopy;  Laterality: N/A;    LUMBAR DISCECTOMY      SPLENIC ARTERY EMBOLIZATION  04/08/2016    Dr Taveras    TONSILLECTOMY         Review of patient's allergies indicates:   Allergen Reactions    Nsaids (non-steroidal anti-inflammatory drug)      D/t to liver disease.    Tylenol [acetaminophen]      D/t liver disease.    Penicillins Other (See Comments)     Tolerated pip-tazo in 8/2016 without issue  Since childhood was told not to take it       Family History     None        Social History Main Topics    Smoking status: Never Smoker    Smokeless tobacco: Current User     Types: Chew    Alcohol use No    Drug use: No    Sexual activity: Not on file       No prescriptions prior to admission.       Review of Systems   Constitutional: Positive for fatigue. Negative for chills and fever.   Respiratory: Negative for cough and shortness of breath.    Cardiovascular: Positive for leg swelling.   Gastrointestinal: Positive for abdominal distention and diarrhea (with lactulose).   Genitourinary: Negative for decreased urine volume, frequency and hematuria.   Musculoskeletal: Negative for back pain and myalgias.   Neurological: Positive for weakness.        + forgetfullness   Hematological: Bruises/bleeds easily.    Psychiatric/Behavioral: Negative for confusion.     Objective:     Vital Signs (Most Recent):    Vital Signs (24h Range):  Temp:  [98.3 °F (36.8 °C)] 98.3 °F (36.8 °C)  Pulse:  [96] 96  Resp:  [18] 18  SpO2:  [97 %] 97 %  BP: (132)/(64) 132/64        There is no height or weight on file to calculate BMI.    No intake or output data in the 24 hours ending 06/22/18 5077    Physical Exam   Constitutional: He is oriented to person, place, and time. He appears well-developed.   Temporal and distal extremity muscle wasting  Anasarca   HENT:   Head: Normocephalic and atraumatic.   Eyes: Scleral icterus is present.   Cardiovascular: Normal rate, regular rhythm and normal heart sounds.    Pulmonary/Chest: Effort normal. He has rales (BLL).   Abdominal: Bowel sounds are normal. He exhibits distension (ascites, firm). There is no tenderness.   Musculoskeletal: He exhibits edema (3+ BLE).   Neurological: He is alert and oriented to person, place, and time.   + asterixis   Skin: Skin is warm. There is erythema.   UE redness noted  BLE erythematous, weeping   Psychiatric: He has a normal mood and affect. His behavior is normal. Judgment and thought content normal.   Nursing note and vitals reviewed.      Laboratory:  CBC:   Recent Labs  Lab 06/22/18  1044   WBC 6.75   RBC 2.78*   HGB 9.2*   HCT 28.0*   PLT 73*   *   MCH 33.1*   MCHC 32.9     CMP:   Recent Labs  Lab 06/22/18  1044   *   CALCIUM 8.9   ALBUMIN 2.4*   PROT 5.2*   *   K 3.8   CO2 18*      BUN 17   CREATININE 2.3*   ALKPHOS 81   ALT 25   AST 28   BILITOT 2.2*     Coagulation:   Recent Labs  Lab 06/22/18  1044   INR 1.8*     Labs within the past 24 hours have been reviewed.

## 2018-06-22 NOTE — ASSESSMENT & PLAN NOTE
Listed for liver txp - MELD updated to 26 today  MELD-Na score: 26 at 6/22/2018 10:44 AM  MELD score: 24 at 6/22/2018 10:44 AM  Calculated from:  Serum Creatinine: 2.3 mg/dL at 6/22/2018 10:44 AM  Serum Sodium: 134 mmol/L at 6/22/2018 10:44 AM  Total Bilirubin: 2.2 mg/dL at 6/22/2018 10:44 AM  INR(ratio): 1.8 at 6/22/2018 10:44 AM  Age: 63 years

## 2018-06-22 NOTE — HPI
Mr. Adamson is a 64 yo M with PMH ESLD secondary to ETOH cirrhosis, CKD3, CAD.  Complications of liver disease include hyperbilirubinemia, hypoalbuminemia, severe malnutrition,  hepatic encephalopathy, thrombocytopenia, splenomegaly, ascites, hypervolemia, coagulopathy, portal HTN.  Pt admitted 6/22 for acutely worsening hepatic encephalopathy and concern for BLE cellulitis as well as severe volume overload.  Complications of ELSD managed while inpt and he received liver offer on 7/23/18.  Pt underwent liver transplant without complication: steroid induction, DBD, CMV -/-.  Donor with E coli bacteremia resistant to cipro placed on Rocephin x 2 weeks per ID recs.  Also on fluconazole x 30 days as pt very ill prior to transplant. Post transplant, with nice trend down of AST/ALT and LFTs.  Liver U/S with elevated RIs and decreased HAV.  During initial post operative period, pt required pressor support.  Pt with expected post operative VICKY as with VICKY prior to surgery.  Nephrology consulted and pt placed on intermittent CRRT.  Pressor requirement decreased and pt tolerated CRRT without pressors.  Pt with asymptomatic bradycardia while in ICU prompting cardiology consult. Pt without need for acute intervention but atropine placed at bedside.  HR has improved POD 7 and 8.  He was transferred to the TSU 7/30 PM.          Liver US 7/31 showed small fluid collection likely an adrenal hemorrhage vs hematoma. H&H decreased to 6.6/20.3 8/1- responded to 1 u PRBCs. H/H stable 8/2 AM. Dropped again 8/3 AM- 1 u PRBC given. H&H now stable. Iron studies checked and pt started on epo.

## 2018-06-22 NOTE — ASSESSMENT & PLAN NOTE
IV lasix + albumin for VICKY  Pt with appearance of venous stasis with signif blistering/weeping  Consult wound care  Will likely need BLE ABIs

## 2018-06-23 LAB
25(OH)D3+25(OH)D2 SERPL-MCNC: 9 NG/ML
ALBUMIN SERPL BCP-MCNC: 2.6 G/DL
ALP SERPL-CCNC: 66 U/L
ALT SERPL W/O P-5'-P-CCNC: 22 U/L
ANION GAP SERPL CALC-SCNC: 8 MMOL/L
AST SERPL-CCNC: 28 U/L
BASOPHILS # BLD AUTO: 0.06 K/UL
BASOPHILS NFR BLD: 1.1 %
BILIRUB SERPL-MCNC: 2.4 MG/DL
BUN SERPL-MCNC: 18 MG/DL
CALCIUM SERPL-MCNC: 8.9 MG/DL
CHLORIDE SERPL-SCNC: 110 MMOL/L
CO2 SERPL-SCNC: 20 MMOL/L
CREAT SERPL-MCNC: 2.1 MG/DL
DIFFERENTIAL METHOD: ABNORMAL
EOSINOPHIL # BLD AUTO: 0.5 K/UL
EOSINOPHIL NFR BLD: 10.1 %
ERYTHROCYTE [DISTWIDTH] IN BLOOD BY AUTOMATED COUNT: 17.4 %
EST. GFR  (AFRICAN AMERICAN): 37.9 ML/MIN/1.73 M^2
EST. GFR  (NON AFRICAN AMERICAN): 32.7 ML/MIN/1.73 M^2
GLUCOSE SERPL-MCNC: 109 MG/DL
HCT VFR BLD AUTO: 24.8 %
HGB BLD-MCNC: 8.9 G/DL
IMM GRANULOCYTES # BLD AUTO: 0.02 K/UL
IMM GRANULOCYTES NFR BLD AUTO: 0.4 %
INR PPP: 2
LYMPHOCYTES # BLD AUTO: 0.7 K/UL
LYMPHOCYTES NFR BLD: 13.7 %
MAGNESIUM SERPL-MCNC: 1.4 MG/DL
MCH RBC QN AUTO: 34.9 PG
MCHC RBC AUTO-ENTMCNC: 35.9 G/DL
MCV RBC AUTO: 97 FL
MONOCYTES # BLD AUTO: 0.7 K/UL
MONOCYTES NFR BLD: 14 %
NEUTROPHILS # BLD AUTO: 3.2 K/UL
NEUTROPHILS NFR BLD: 60.7 %
NRBC BLD-RTO: 0 /100 WBC
PLATELET # BLD AUTO: 64 K/UL
PMV BLD AUTO: 12 FL
POTASSIUM SERPL-SCNC: 3.9 MMOL/L
PROT SERPL-MCNC: 5 G/DL
PROTHROMBIN TIME: 19.1 SEC
RBC # BLD AUTO: 2.55 M/UL
SODIUM SERPL-SCNC: 138 MMOL/L
WBC # BLD AUTO: 5.27 K/UL

## 2018-06-23 PROCEDURE — 25000003 PHARM REV CODE 250: Mod: NTX | Performed by: INTERNAL MEDICINE

## 2018-06-23 PROCEDURE — 85025 COMPLETE CBC W/AUTO DIFF WBC: CPT | Mod: NTX

## 2018-06-23 PROCEDURE — 82306 VITAMIN D 25 HYDROXY: CPT | Mod: NTX

## 2018-06-23 PROCEDURE — 25000003 PHARM REV CODE 250: Mod: NTX | Performed by: PHYSICIAN ASSISTANT

## 2018-06-23 PROCEDURE — 63600175 PHARM REV CODE 636 W HCPCS: Mod: NTX | Performed by: PHYSICIAN ASSISTANT

## 2018-06-23 PROCEDURE — P9047 ALBUMIN (HUMAN), 25%, 50ML: HCPCS | Mod: JG,NTX | Performed by: PHYSICIAN ASSISTANT

## 2018-06-23 PROCEDURE — 83735 ASSAY OF MAGNESIUM: CPT | Mod: NTX

## 2018-06-23 PROCEDURE — 84590 ASSAY OF VITAMIN A: CPT | Mod: NTX

## 2018-06-23 PROCEDURE — 63600175 PHARM REV CODE 636 W HCPCS: Mod: JG,NTX | Performed by: INTERNAL MEDICINE

## 2018-06-23 PROCEDURE — 20600001 HC STEP DOWN PRIVATE ROOM: Mod: NTX

## 2018-06-23 PROCEDURE — 36415 COLL VENOUS BLD VENIPUNCTURE: CPT | Mod: NTX

## 2018-06-23 PROCEDURE — P9047 ALBUMIN (HUMAN), 25%, 50ML: HCPCS | Mod: JG,NTX | Performed by: INTERNAL MEDICINE

## 2018-06-23 PROCEDURE — 99233 SBSQ HOSP IP/OBS HIGH 50: CPT | Mod: NTX,,, | Performed by: INTERNAL MEDICINE

## 2018-06-23 PROCEDURE — 80053 COMPREHEN METABOLIC PANEL: CPT | Mod: NTX

## 2018-06-23 PROCEDURE — 85610 PROTHROMBIN TIME: CPT | Mod: NTX

## 2018-06-23 PROCEDURE — 84630 ASSAY OF ZINC: CPT | Mod: NTX

## 2018-06-23 RX ORDER — HYDROXYZINE HYDROCHLORIDE 25 MG/1
25 TABLET, FILM COATED ORAL 3 TIMES DAILY PRN
Status: DISCONTINUED | OUTPATIENT
Start: 2018-06-23 | End: 2018-06-26

## 2018-06-23 RX ORDER — FUROSEMIDE 10 MG/ML
60 INJECTION INTRAMUSCULAR; INTRAVENOUS ONCE
Status: COMPLETED | OUTPATIENT
Start: 2018-06-23 | End: 2018-06-23

## 2018-06-23 RX ORDER — FAMOTIDINE 20 MG/1
20 TABLET, FILM COATED ORAL 2 TIMES DAILY
Status: DISCONTINUED | OUTPATIENT
Start: 2018-06-23 | End: 2018-06-26

## 2018-06-23 RX ORDER — ALBUMIN HUMAN 250 G/1000ML
25 SOLUTION INTRAVENOUS ONCE
Status: COMPLETED | OUTPATIENT
Start: 2018-06-23 | End: 2018-06-23

## 2018-06-23 RX ORDER — SPIRONOLACTONE 50 MG/1
50 TABLET, FILM COATED ORAL DAILY
Status: DISCONTINUED | OUTPATIENT
Start: 2018-06-23 | End: 2018-06-25

## 2018-06-23 RX ORDER — TRAZODONE HYDROCHLORIDE 50 MG/1
50 TABLET ORAL NIGHTLY PRN
Status: DISCONTINUED | OUTPATIENT
Start: 2018-06-23 | End: 2018-06-25

## 2018-06-23 RX ADMIN — SPIRONOLACTONE 50 MG: 50 TABLET, FILM COATED ORAL at 12:06

## 2018-06-23 RX ADMIN — PANTOPRAZOLE SODIUM 40 MG: 40 TABLET, DELAYED RELEASE ORAL at 05:06

## 2018-06-23 RX ADMIN — RIFAXIMIN 550 MG: 550 TABLET ORAL at 09:06

## 2018-06-23 RX ADMIN — ALBUMIN HUMAN 25 G: 0.25 SOLUTION INTRAVENOUS at 12:06

## 2018-06-23 RX ADMIN — LEVETIRACETAM 500 MG: 500 TABLET ORAL at 08:06

## 2018-06-23 RX ADMIN — LACTULOSE 15 G: 20 SOLUTION ORAL at 09:06

## 2018-06-23 RX ADMIN — LACTULOSE 15 G: 20 SOLUTION ORAL at 02:06

## 2018-06-23 RX ADMIN — HEPARIN SODIUM 5000 UNITS: 5000 INJECTION, SOLUTION INTRAVENOUS; SUBCUTANEOUS at 02:06

## 2018-06-23 RX ADMIN — FAMOTIDINE 20 MG: 20 TABLET ORAL at 12:06

## 2018-06-23 RX ADMIN — FUROSEMIDE 60 MG: 10 INJECTION, SOLUTION INTRAMUSCULAR; INTRAVENOUS at 12:06

## 2018-06-23 RX ADMIN — MAGNESIUM OXIDE TAB 400 MG (241.3 MG ELEMENTAL MG) 400 MG: 400 (241.3 MG) TAB at 08:06

## 2018-06-23 RX ADMIN — TRAZODONE HYDROCHLORIDE 50 MG: 50 TABLET ORAL at 09:06

## 2018-06-23 RX ADMIN — RIFAXIMIN 550 MG: 550 TABLET ORAL at 08:06

## 2018-06-23 RX ADMIN — LEVETIRACETAM 500 MG: 500 TABLET ORAL at 09:06

## 2018-06-23 RX ADMIN — HEPARIN SODIUM 5000 UNITS: 5000 INJECTION, SOLUTION INTRAVENOUS; SUBCUTANEOUS at 09:06

## 2018-06-23 RX ADMIN — ALBUMIN HUMAN 25 G: 0.25 SOLUTION INTRAVENOUS at 05:06

## 2018-06-23 RX ADMIN — FAMOTIDINE 20 MG: 20 TABLET ORAL at 09:06

## 2018-06-23 RX ADMIN — LACTULOSE 15 G: 20 SOLUTION ORAL at 08:06

## 2018-06-23 RX ADMIN — HEPARIN SODIUM 5000 UNITS: 5000 INJECTION, SOLUTION INTRAVENOUS; SUBCUTANEOUS at 05:06

## 2018-06-23 NOTE — ASSESSMENT & PLAN NOTE
- VICKY improving with albumin infusion  - Ur Na: 32, unlikely for HRS  - continue diuresis and monitor

## 2018-06-23 NOTE — PROGRESS NOTES
[facility]  Liver Transplant  Progress Note    Patient Name: Alon Adamson  MRN: [id number]  Admission Date: 6/22/2018  Hospital Length of Stay: 1 days  Code Status: Full Code  Primary Care Provider: Mckinley Vides MD    Subjective:     History of Present Illness:  Mr. Adamson is a 64 yo M with PMH ESLD secondary to ETOH cirrhosis, CKD3, CAD.  Complications of liver disease include hyperbilirubinemia, hypoalbuminemia, severe malnutrition,   hepatic encephalopathy, thrombocytopenia, splenomegaly, ascites, hypervolemia, coagulopathy, portal HTN.  Pt recently admitted for severe hepatic encephalopathy requiring intubation but has been stable from mentation standpoint post resolution of acute enceophalopathy.      Mr. Adamson was seen in clinic today and was noted to have significant hypervolemia, weeping from BLE, and VICKY on CKD3 on routine labs.  His MELD increased from 22 to 26 on todays labs- coordinator notified and MELD updated.  Cr elevated to 2.3 from baseline of 1.4.  Also with worsening ascites.  Pt reports increased continued pruritis, generalized fatigue and weakness.  He presented today in wheelchair.  ROS otherwise negative.  No s/s active infection.      Hospital Course:  6/23/18: Admitted on 6/22 with VICKY, anasarca, blistering edema in LE. Given 60 mg furosemide IV and albumin infusion. UOP:1300ml. Net: neg 940 ml. Cr: improving 2.1 (from 2.3, his baseline was 1.5). Patient c/o leg edema, itching.     Scheduled Meds:   albumin human 25%  25 g Intravenous Once    famotidine  20 mg Oral BID    furosemide  60 mg Intravenous Once    heparin (porcine)  5,000 Units Subcutaneous Q8H    lactulose  15 g Oral TID    levETIRAcetam  500 mg Oral BID    magnesium oxide  400 mg Oral Daily    rifAXImin  550 mg Oral BID    spironolactone  50 mg Oral Daily     Continuous Infusions:  PRN Meds:acetaminophen, diphenhydrAMINE-zinc acetate 1-0.1%, hydrOXYzine HCl, ondansetron, sodium chloride 0.9%,  traZODone    Review of Systems   Constitutional: Positive for fatigue and unexpected weight change (weight gain). Negative for activity change, appetite change, chills, diaphoresis and fever.   HENT: Negative for congestion, sinus pressure, sneezing and sore throat.    Eyes: Negative for pain, redness and visual disturbance.   Respiratory: Negative for cough, shortness of breath and wheezing.    Cardiovascular: Positive for leg swelling. Negative for chest pain and palpitations.   Gastrointestinal: Positive for abdominal distention. Negative for abdominal pain, anal bleeding, blood in stool, diarrhea, nausea and vomiting.   Endocrine: Negative for cold intolerance, heat intolerance and polydipsia.   Genitourinary: Positive for scrotal swelling. Negative for dysuria and testicular pain.   Musculoskeletal: Negative for arthralgias, back pain, myalgias and neck pain.   Skin: Negative for pallor and rash.        itching   Allergic/Immunologic: Negative for food allergies and immunocompromised state.   Neurological: Negative for dizziness, syncope, speech difficulty and weakness.   Hematological: Bruises/bleeds easily.   Psychiatric/Behavioral: Positive for decreased concentration and sleep disturbance. Negative for agitation and confusion.     Objective:     Vital Signs (Most Recent):  Temp: 98.4 °F (36.9 °C) (06/23/18 0757)  Pulse: 94 (06/23/18 0757)  Resp: 16 (06/23/18 0757)  BP: (!) 116/57 (06/23/18 0757)  SpO2: 98 % (06/23/18 0400) Vital Signs (24h Range):  Temp:  [98.1 °F (36.7 °C)-98.4 °F (36.9 °C)] 98.4 °F (36.9 °C)  Pulse:  [85-99] 94  Resp:  [16-18] 16  SpO2:  [97 %-100 %] 98 %  BP: (116-149)/(52-69) 116/57        There is no height or weight on file to calculate BMI.    Intake/Output - Last 3 Shifts       06/21 0700 - 06/22 0659 06/22 0700 - 06/23 0659 06/23 0700 - 06/24 0659    P.O.  360     Total Intake   360      Urine  1300     Total Output   1300      Net   -940                   Physical Exam    Constitutional: He is oriented to person, place, and time. He appears well-developed. No distress.   Chronically ill-appearing. Malnourished. Temporal wasting.     HENT:   Head: Normocephalic and atraumatic.   Mouth/Throat: Oropharynx is clear and moist.   Eyes: Conjunctivae are normal. No scleral icterus.   Neck: Normal range of motion.   Cardiovascular: Normal rate, regular rhythm, normal heart sounds and intact distal pulses.    Pulmonary/Chest: Effort normal and breath sounds normal. No respiratory distress. He has no wheezes. He has no rales.   Abdominal: Soft. Normal appearance and bowel sounds are normal. He exhibits distension. He exhibits no shifting dullness, no pulsatile liver, no fluid wave and no ascites. There is no splenomegaly or hepatomegaly. There is no tenderness. There is no guarding. No hernia.   Musculoskeletal: He exhibits edema (3+ bilaterally with blistering in LE).   Neurological: He is alert and oriented to person, place, and time. He is not disoriented.   Skin: Skin is warm and dry. No rash noted. He is not diaphoretic. There is erythema (lower extremities).   Psychiatric: He has a normal mood and affect. His behavior is normal. His mood appears not anxious. His affect is not inappropriate. He is not agitated. He is communicative. He is attentive.   Nursing note and vitals reviewed.      Laboratory:  Immunosuppressants     None        CBC:   Recent Labs  Lab 06/23/18  0401   WBC 5.27   RBC 2.55*   HGB 8.9*   HCT 24.8*   PLT 64*   MCV 97   MCH 34.9*   MCHC 35.9     CMP:   Recent Labs  Lab 06/23/18  0401      CALCIUM 8.9   ALBUMIN 2.6*   PROT 5.0*      K 3.9   CO2 20*      BUN 18   CREATININE 2.1*   ALKPHOS 66   ALT 22   AST 28   BILITOT 2.4*     Coagulation:   Recent Labs  Lab 06/23/18  0401   INR 2.0*     Labs within the past 24 hours have been reviewed.    Diagnostic Results:  I have personally reviewed all pertinent imaging studies.    Assessment/Plan:     * Acute  kidney injury superimposed on chronic kidney disease    - VICKY improving with albumin infusion  - Ur Na: 32, unlikely for HRS  - continue diuresis and monitor          Bilateral edema of lower extremity    IV furosemide, spironolactone PO and albumin infusion  Pt with appearance of venous stasis with signif blistering/weeping  Consulted wound care  Will likely need BLE ABIs          Physical deconditioning    - PT consulted          Ascites due to alcoholic cirrhosis    See BLE edema          Coagulopathy    Secondary to decompensated liver disease          Cholestatic pruritus    - Atarax ordered          Hypoalbuminemia due to protein-calorie malnutrition    - Dietary consulted          Decompensated hepatic cirrhosis    Listed for liver txp - MELD updated to 26 today  MELD-Na score: 26 at 6/22/2018 10:44 AM  MELD score: 24 at 6/22/2018 10:44 AM  Calculated from:  Serum Creatinine: 2.3 mg/dL at 6/22/2018 10:44 AM  Serum Sodium: 134 mmol/L at 6/22/2018 10:44 AM  Total Bilirubin: 2.2 mg/dL at 6/22/2018 10:44 AM  INR(ratio): 1.8 at 6/22/2018 10:44 AM  Age: 63 years            Thrombocytopenia    Secondary to splenomegaly from portal HTN- should improve with txp  No s/s overt bleed          Hepatic encephalopathy    Acute on chronic- grade 1  Resume home lactulose and rifaximin              VTE Risk Mitigation         Ordered     heparin (porcine) injection 5,000 Units  Every 8 hours      06/22/18 1502     IP VTE HIGH RISK PATIENT  Once      06/22/18 1502          The patients clinical status was discussed at multidisplinary rounds, involving transplant surgery, transplant medicine, pharmacy, nursing, nutrition, and social work    Discharge Planning:  No Patient Care Coordination Note on file.      Tammie aMthur MD  Liver Transplant  [facility]

## 2018-06-23 NOTE — ASSESSMENT & PLAN NOTE
IV furosemide, spironolactone PO and albumin infusion  Pt with appearance of venous stasis with signif blistering/weeping  Consulted wound care  Will likely need BLE ABIs

## 2018-06-23 NOTE — H&P
[facility]  Liver Transplant  History & Physical    Patient Name: Alon Adamson  MRN: [id number]  Admission Date: 6/22/2018  Code Status: Full Code  Primary Care Provider: Mckinley Vides MD  Post-Operative Day:      ORGAN:     Disease Etiology: Alcoholic Cirrhosis  Donor Type:     CDC High Risk:     Donor CMV Status:   Donor CMV Status:   Donor HBcAB:     Donor HCV Status:     Whole or Partial:   Biliary Anastomosis:   Arterial Anatomy:     Subjective:     History of Present Illness:  Mr. 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  Complications of liver disease include hyperbilirubinemia, hypoalbuminemia, severe malnutrition,   hepatic encephalopathy, thrombocytopenia, splenomegaly, ascites, hypervolemia, coagulopathy, portal HTN.  Pt recently admitted for severe hepatic encephalopathy requiring intubation but has been stable from mentation standpoint post resolution of acute enceophalopathy.      Mr. Adamson was seen in clinic today and was noted to have significant hypervolemia, weeping from BLE, and VICKY on CKD3 on routine labs.  His MELD increased from 22 to 26 on todays labs- coordinator notified and MELD updated.  Cr elevated to 2.3 from baseline of 1.4.  Also with worsening ascites.  Pt reports increased continued pruritis, generalized fatigue and weakness.  He presented today in wheelchair.  ROS otherwise negative.  No s/s active infection.      Past Medical History:   Diagnosis Date    Anticoagulant long-term use     Arthritis     Back pain     Cellulitis     Cirrhosis     Coronary artery disease     DM (diabetes mellitus)     Hearing loss     right ear    Hepatic encephalopathy     Hypertension     diagnosed today (11/25/2015) and prescribed toprol    Leg edema     Nephrolithiasis     JACK (obstructive sleep apnea)     Seizures     Splenomegaly        Past Surgical History:   Procedure Laterality Date    APPENDECTOMY      Open    BACK  SURGERY      CHOLECYSTECTOMY      laprascopic    COLONOSCOPY N/A 1/25/2018    Procedure: COLONOSCOPY;  Surgeon: Lashawn Myles MD;  Location: [facility] ([address] [place] [address]);  Service: Endoscopy;  Laterality: N/A;    LUMBAR DISCECTOMY      SPLENIC ARTERY EMBOLIZATION  04/08/2016    Dr Taveras    TONSILLECTOMY         Review of patient's allergies indicates:   Allergen Reactions    Nsaids (non-steroidal anti-inflammatory drug)      D/t to liver disease.    Tylenol [acetaminophen]      D/t liver disease.    Penicillins Other (See Comments)     Tolerated pip-tazo in 8/2016 without issue  Since childhood was told not to take it       Family History     None        Social History Main Topics    Smoking status: Never Smoker    Smokeless tobacco: Current User     Types: Chew    Alcohol use No    Drug use: No    Sexual activity: Not on file       No prescriptions prior to admission.       Review of Systems   Constitutional: Positive for fatigue. Negative for chills and fever.   Respiratory: Negative for cough and shortness of breath.    Cardiovascular: Positive for leg swelling.   Gastrointestinal: Positive for abdominal distention and diarrhea (with lactulose).   Genitourinary: Negative for decreased urine volume, frequency and hematuria.   Musculoskeletal: Negative for back pain and myalgias.   Neurological: Positive for weakness.        + forgetfullness   Hematological: Bruises/bleeds easily.   Psychiatric/Behavioral: Negative for confusion.     Objective:     Vital Signs (Most Recent):    Vital Signs (24h Range):  Temp:  [98.3 °F (36.8 °C)] 98.3 °F (36.8 °C)  Pulse:  [96] 96  Resp:  [18] 18  SpO2:  [97 %] 97 %  BP: (132)/(64) 132/64        There is no height or weight on file to calculate BMI.    No intake or output data in the 24 hours ending 06/22/18 0117    Physical Exam   Constitutional: He is oriented to person, place, and time. He appears well-developed.   Temporal and distal extremity muscle wasting  Anasarca    HENT:   Head: Normocephalic and atraumatic.   Eyes: Scleral icterus is present.   Cardiovascular: Normal rate, regular rhythm and normal heart sounds.    Pulmonary/Chest: Effort normal. He has rales (BLL).   Abdominal: Bowel sounds are normal. He exhibits distension (ascites, firm). There is no tenderness.   Musculoskeletal: He exhibits edema (3+ BLE).   Neurological: He is alert and oriented to person, place, and time.   + asterixis   Skin: Skin is warm. There is erythema.   UE redness noted  BLE erythematous, weeping   Psychiatric: He has a normal mood and affect. His behavior is normal. Judgment and thought content normal.   Nursing note and vitals reviewed.      Laboratory:  CBC:   Recent Labs  Lab 06/22/18  1044   WBC 6.75   RBC 2.78*   HGB 9.2*   HCT 28.0*   PLT 73*   *   MCH 33.1*   MCHC 32.9     CMP:   Recent Labs  Lab 06/22/18  1044   *   CALCIUM 8.9   ALBUMIN 2.4*   PROT 5.2*   *   K 3.8   CO2 18*      BUN 17   CREATININE 2.3*   ALKPHOS 81   ALT 25   AST 28   BILITOT 2.2*     Coagulation:   Recent Labs  Lab 06/22/18  1044   INR 1.8*     Labs within the past 24 hours have been reviewed.      Assessment/Plan:     * Acute kidney injury superimposed on chronic kidney disease    - Admit to liver txp service  - Unclear source  - Start HRS protocol until ruled in/out: albumin + octreotide + midodrine  - Urine Na sent at admit          Physical deconditioning    - PT consulted          Ascites due to alcoholic cirrhosis    See BLE edema          Coagulopathy    Secondary to decompensated liver disease          Cholestatic pruritus    - Atarax ordered          Hypoalbuminemia due to protein-calorie malnutrition    - Dietary consulted          Decompensated hepatic cirrhosis    Listed for liver txp - MELD updated to 26 today  MELD-Na score: 26 at 6/22/2018 10:44 AM  MELD score: 24 at 6/22/2018 10:44 AM  Calculated from:  Serum Creatinine: 2.3 mg/dL at 6/22/2018 10:44 AM  Serum Sodium: 134  mmol/L at 6/22/2018 10:44 AM  Total Bilirubin: 2.2 mg/dL at 6/22/2018 10:44 AM  INR(ratio): 1.8 at 6/22/2018 10:44 AM  Age: 63 years            Thrombocytopenia    Secondary to splenomegaly from portal HTN- should improve with txp  No s/s overt bleed          Bilateral edema of lower extremity    IV lasix + albumin for VICKY  Pt with appearance of venous stasis with signif blistering/weeping  Consult wound care  Will likely need BLE ABIs          Hepatic encephalopathy    Acute on chronic- grade 1  Resume home lactulose and rifaximin                  Bebeto Virgen PA-C  Liver Transplant  [facility]

## 2018-06-23 NOTE — PLAN OF CARE
Problem: Patient Care Overview  Goal: Plan of Care Review  Outcome: Ongoing (interventions implemented as appropriate)  Pt AAOx4, VSS, in NAD throughout shift.  Pt resting comfortably in room throughout shift.  Pt tolerating all medications well, does not have any c/o, no PRN meds given so far this shift.  Pt napping during most of shift despite blinds being opened and TV on.  Pt uses urinal independently.  Pt with +3-4 edema to BLEs, tender to touch.  RN maintaining fall risk precautions throughout shift.  Pt denies pain or other need at this time; RN will continue to monitor, assess, and alter plan of care as needed until report given to oncoming night shift nurse.

## 2018-06-23 NOTE — SUBJECTIVE & OBJECTIVE
Scheduled Meds:   albumin human 25%  25 g Intravenous Once    famotidine  20 mg Oral BID    furosemide  60 mg Intravenous Once    heparin (porcine)  5,000 Units Subcutaneous Q8H    lactulose  15 g Oral TID    levETIRAcetam  500 mg Oral BID    magnesium oxide  400 mg Oral Daily    rifAXImin  550 mg Oral BID    spironolactone  50 mg Oral Daily     Continuous Infusions:  PRN Meds:acetaminophen, diphenhydrAMINE-zinc acetate 1-0.1%, hydrOXYzine HCl, ondansetron, sodium chloride 0.9%, traZODone    Review of Systems   Constitutional: Positive for fatigue and unexpected weight change (weight gain). Negative for activity change, appetite change, chills, diaphoresis and fever.   HENT: Negative for congestion, sinus pressure, sneezing and sore throat.    Eyes: Negative for pain, redness and visual disturbance.   Respiratory: Negative for cough, shortness of breath and wheezing.    Cardiovascular: Positive for leg swelling. Negative for chest pain and palpitations.   Gastrointestinal: Positive for abdominal distention. Negative for abdominal pain, anal bleeding, blood in stool, diarrhea, nausea and vomiting.   Endocrine: Negative for cold intolerance, heat intolerance and polydipsia.   Genitourinary: Positive for scrotal swelling. Negative for dysuria and testicular pain.   Musculoskeletal: Negative for arthralgias, back pain, myalgias and neck pain.   Skin: Negative for pallor and rash.        itching   Allergic/Immunologic: Negative for food allergies and immunocompromised state.   Neurological: Negative for dizziness, syncope, speech difficulty and weakness.   Hematological: Bruises/bleeds easily.   Psychiatric/Behavioral: Positive for decreased concentration and sleep disturbance. Negative for agitation and confusion.     Objective:     Vital Signs (Most Recent):  Temp: 98.4 °F (36.9 °C) (06/23/18 0757)  Pulse: 94 (06/23/18 0757)  Resp: 16 (06/23/18 0757)  BP: (!) 116/57 (06/23/18 0757)  SpO2: 98 % (06/23/18 0400)  Vital Signs (24h Range):  Temp:  [98.1 °F (36.7 °C)-98.4 °F (36.9 °C)] 98.4 °F (36.9 °C)  Pulse:  [85-99] 94  Resp:  [16-18] 16  SpO2:  [97 %-100 %] 98 %  BP: (116-149)/(52-69) 116/57        There is no height or weight on file to calculate BMI.    Intake/Output - Last 3 Shifts       06/21 0700 - 06/22 0659 06/22 0700 - 06/23 0659 06/23 0700 - 06/24 0659    P.O.  360     Total Intake   360      Urine  1300     Total Output   1300      Net   -940                   Physical Exam   Constitutional: He is oriented to person, place, and time. He appears well-developed. No distress.   Chronically ill-appearing. Malnourished. Temporal wasting.     HENT:   Head: Normocephalic and atraumatic.   Mouth/Throat: Oropharynx is clear and moist.   Eyes: Conjunctivae are normal. No scleral icterus.   Neck: Normal range of motion.   Cardiovascular: Normal rate, regular rhythm, normal heart sounds and intact distal pulses.    Pulmonary/Chest: Effort normal and breath sounds normal. No respiratory distress. He has no wheezes. He has no rales.   Abdominal: Soft. Normal appearance and bowel sounds are normal. He exhibits distension. He exhibits no shifting dullness, no pulsatile liver, no fluid wave and no ascites. There is no splenomegaly or hepatomegaly. There is no tenderness. There is no guarding. No hernia.   Musculoskeletal: He exhibits edema (3+ bilaterally with blistering in LE).   Neurological: He is alert and oriented to person, place, and time. He is not disoriented.   Skin: Skin is warm and dry. No rash noted. He is not diaphoretic. There is erythema (lower extremities).   Psychiatric: He has a normal mood and affect. His behavior is normal. His mood appears not anxious. His affect is not inappropriate. He is not agitated. He is communicative. He is attentive.   Nursing note and vitals reviewed.      Laboratory:  Immunosuppressants     None        CBC:   Recent Labs  Lab 06/23/18  0401   WBC 5.27   RBC 2.55*   HGB 8.9*   HCT  24.8*   PLT 64*   MCV 97   MCH 34.9*   MCHC 35.9     CMP:   Recent Labs  Lab 06/23/18  0401      CALCIUM 8.9   ALBUMIN 2.6*   PROT 5.0*      K 3.9   CO2 20*      BUN 18   CREATININE 2.1*   ALKPHOS 66   ALT 22   AST 28   BILITOT 2.4*     Coagulation:   Recent Labs  Lab 06/23/18  0401   INR 2.0*     Labs within the past 24 hours have been reviewed.    Diagnostic Results:  I have personally reviewed all pertinent imaging studies.

## 2018-06-23 NOTE — HOSPITAL COURSE
Interval history: no acute events overnight. Nausea again this am - scheduled protonix and zofran.   Right trialysis cath removed 8/14.  Cont to diurese w lasix 80 mg bid today- transition to oral lasix tomorrow.  Cr 3.1 today.  Pt still unable to measure urine.  Weight cont to decrease. LFTs remain wnl.  Patient working w PT/OT.  He is able to sit up at bedside w/o assist.  Patient was hyperkalemic over the wkd- Bactrim was held.  G6PD w normal activity.  Rehab consulted - pt can go tomorrow.

## 2018-06-24 LAB
ALBUMIN SERPL BCP-MCNC: 2.5 G/DL
ALP SERPL-CCNC: 64 U/L
ALT SERPL W/O P-5'-P-CCNC: 18 U/L
ANION GAP SERPL CALC-SCNC: 9 MMOL/L
AST SERPL-CCNC: 24 U/L
BASOPHILS # BLD AUTO: 0.03 K/UL
BASOPHILS NFR BLD: 1.2 %
BILIRUB SERPL-MCNC: 2.1 MG/DL
BUN SERPL-MCNC: 20 MG/DL
CALCIUM SERPL-MCNC: 9.1 MG/DL
CHLORIDE SERPL-SCNC: 111 MMOL/L
CO2 SERPL-SCNC: 20 MMOL/L
CREAT SERPL-MCNC: 2 MG/DL
DIFFERENTIAL METHOD: ABNORMAL
EOSINOPHIL # BLD AUTO: 0.3 K/UL
EOSINOPHIL NFR BLD: 10.2 %
ERYTHROCYTE [DISTWIDTH] IN BLOOD BY AUTOMATED COUNT: 17.6 %
EST. GFR  (AFRICAN AMERICAN): 40.2 ML/MIN/1.73 M^2
EST. GFR  (NON AFRICAN AMERICAN): 34.7 ML/MIN/1.73 M^2
GLUCOSE SERPL-MCNC: 85 MG/DL
HCT VFR BLD AUTO: 22.4 %
HGB BLD-MCNC: 7.8 G/DL
IMM GRANULOCYTES # BLD AUTO: 0 K/UL
IMM GRANULOCYTES NFR BLD AUTO: 0 %
INR PPP: 2.1
LYMPHOCYTES # BLD AUTO: 0.5 K/UL
LYMPHOCYTES NFR BLD: 18.3 %
MAGNESIUM SERPL-MCNC: 1.4 MG/DL
MCH RBC QN AUTO: 33.9 PG
MCHC RBC AUTO-ENTMCNC: 34.8 G/DL
MCV RBC AUTO: 97 FL
MONOCYTES # BLD AUTO: 0.5 K/UL
MONOCYTES NFR BLD: 18.3 %
NEUTROPHILS # BLD AUTO: 1.3 K/UL
NEUTROPHILS NFR BLD: 52 %
NRBC BLD-RTO: 0 /100 WBC
PLATELET # BLD AUTO: 47 K/UL
PMV BLD AUTO: 11.9 FL
POTASSIUM SERPL-SCNC: 4 MMOL/L
PROT SERPL-MCNC: 4.6 G/DL
PROTHROMBIN TIME: 20.3 SEC
RBC # BLD AUTO: 2.3 M/UL
SODIUM SERPL-SCNC: 140 MMOL/L
WBC # BLD AUTO: 2.46 K/UL

## 2018-06-24 PROCEDURE — 83735 ASSAY OF MAGNESIUM: CPT | Mod: NTX

## 2018-06-24 PROCEDURE — 97161 PT EVAL LOW COMPLEX 20 MIN: CPT | Mod: NTX

## 2018-06-24 PROCEDURE — 25000003 PHARM REV CODE 250: Mod: NTX | Performed by: PHYSICIAN ASSISTANT

## 2018-06-24 PROCEDURE — 36415 COLL VENOUS BLD VENIPUNCTURE: CPT | Mod: NTX

## 2018-06-24 PROCEDURE — 85025 COMPLETE CBC W/AUTO DIFF WBC: CPT | Mod: NTX

## 2018-06-24 PROCEDURE — 63600175 PHARM REV CODE 636 W HCPCS: Mod: NTX | Performed by: INTERNAL MEDICINE

## 2018-06-24 PROCEDURE — P9047 ALBUMIN (HUMAN), 25%, 50ML: HCPCS | Mod: JG,NTX | Performed by: INTERNAL MEDICINE

## 2018-06-24 PROCEDURE — 97530 THERAPEUTIC ACTIVITIES: CPT | Mod: NTX

## 2018-06-24 PROCEDURE — 20600001 HC STEP DOWN PRIVATE ROOM: Mod: NTX

## 2018-06-24 PROCEDURE — 63600175 PHARM REV CODE 636 W HCPCS: Mod: NTX | Performed by: PHYSICIAN ASSISTANT

## 2018-06-24 PROCEDURE — 85610 PROTHROMBIN TIME: CPT | Mod: NTX

## 2018-06-24 PROCEDURE — 80053 COMPREHEN METABOLIC PANEL: CPT | Mod: NTX

## 2018-06-24 PROCEDURE — 99233 SBSQ HOSP IP/OBS HIGH 50: CPT | Mod: NTX,,, | Performed by: INTERNAL MEDICINE

## 2018-06-24 PROCEDURE — 25000003 PHARM REV CODE 250: Mod: NTX | Performed by: INTERNAL MEDICINE

## 2018-06-24 RX ORDER — FUROSEMIDE 10 MG/ML
60 INJECTION INTRAMUSCULAR; INTRAVENOUS ONCE
Status: COMPLETED | OUTPATIENT
Start: 2018-06-24 | End: 2018-06-24

## 2018-06-24 RX ORDER — LACTULOSE 10 G/15ML
20 SOLUTION ORAL
Status: DISCONTINUED | OUTPATIENT
Start: 2018-06-24 | End: 2018-06-25

## 2018-06-24 RX ORDER — ALBUMIN HUMAN 250 G/1000ML
25 SOLUTION INTRAVENOUS 2 TIMES DAILY
Status: COMPLETED | OUTPATIENT
Start: 2018-06-24 | End: 2018-06-25

## 2018-06-24 RX ADMIN — HEPARIN SODIUM 5000 UNITS: 5000 INJECTION, SOLUTION INTRAVENOUS; SUBCUTANEOUS at 02:06

## 2018-06-24 RX ADMIN — ALBUMIN HUMAN 25 G: 0.25 SOLUTION INTRAVENOUS at 09:06

## 2018-06-24 RX ADMIN — FAMOTIDINE 20 MG: 20 TABLET ORAL at 09:06

## 2018-06-24 RX ADMIN — LACTULOSE 20 G: 20 SOLUTION ORAL at 09:06

## 2018-06-24 RX ADMIN — SPIRONOLACTONE 50 MG: 50 TABLET, FILM COATED ORAL at 09:06

## 2018-06-24 RX ADMIN — LACTULOSE 15 G: 20 SOLUTION ORAL at 09:06

## 2018-06-24 RX ADMIN — LACTULOSE 20 G: 20 SOLUTION ORAL at 02:06

## 2018-06-24 RX ADMIN — RIFAXIMIN 550 MG: 550 TABLET ORAL at 09:06

## 2018-06-24 RX ADMIN — MAGNESIUM OXIDE TAB 400 MG (241.3 MG ELEMENTAL MG) 400 MG: 400 (241.3 MG) TAB at 09:06

## 2018-06-24 RX ADMIN — FUROSEMIDE 60 MG: 10 INJECTION, SOLUTION INTRAMUSCULAR; INTRAVENOUS at 02:06

## 2018-06-24 RX ADMIN — HEPARIN SODIUM 5000 UNITS: 5000 INJECTION, SOLUTION INTRAVENOUS; SUBCUTANEOUS at 06:06

## 2018-06-24 RX ADMIN — LEVETIRACETAM 500 MG: 500 TABLET ORAL at 09:06

## 2018-06-24 RX ADMIN — ACETAMINOPHEN 650 MG: 325 TABLET ORAL at 09:06

## 2018-06-24 RX ADMIN — TRAZODONE HYDROCHLORIDE 50 MG: 50 TABLET ORAL at 09:06

## 2018-06-24 RX ADMIN — HEPARIN SODIUM 5000 UNITS: 5000 INJECTION, SOLUTION INTRAVENOUS; SUBCUTANEOUS at 09:06

## 2018-06-24 NOTE — PLAN OF CARE
Problem: Patient Care Overview  Goal: Plan of Care Review  Outcome: Ongoing (interventions implemented as appropriate)  Pt AAOx4, VSS, in NAD throughout shift.  Pt up in chair for approx 3 hours, pt tolerated well; pt standby or +1 assist.  Pt oriented and not showing s/sx of hepatic encephalopathy so far this shift.  No BMs so far this shift; RN will continue with scheduled doses of lactulose as ordered.  Pt with some pain to right shoulder, RN treated with PRN tylenol, see MAR.  Pt verbalizes understanding to all advice regarding caring for edematous tissue/skin.  Pt with +3-4 BLEs.  RN maintaining fall risk precautions throughout shift.  Pt denies pain or other need at this time; RN will continue to monitor, assess, and alter plan of care as needed until report given to oncoming night shift nurse.

## 2018-06-24 NOTE — SUBJECTIVE & OBJECTIVE
Scheduled Meds:   albumin human 25%  25 g Intravenous BID    famotidine  20 mg Oral BID    furosemide  60 mg Intravenous Once    heparin (porcine)  5,000 Units Subcutaneous Q8H    lactulose  20 g Oral QID WAKE    levETIRAcetam  500 mg Oral BID    magnesium oxide  400 mg Oral Daily    rifAXImin  550 mg Oral BID    spironolactone  50 mg Oral Daily     Continuous Infusions:  PRN Meds:acetaminophen, diphenhydrAMINE-zinc acetate 1-0.1%, hydrOXYzine HCl, ondansetron, sodium chloride 0.9%, traZODone    Review of Systems   Constitutional: Positive for fatigue and unexpected weight change. Negative for activity change, appetite change, chills, diaphoresis and fever.   HENT: Negative for congestion, sinus pressure, sneezing and sore throat.    Eyes: Negative for pain, redness and visual disturbance.   Respiratory: Negative for cough, shortness of breath and wheezing.    Cardiovascular: Positive for leg swelling. Negative for chest pain and palpitations.   Gastrointestinal: Positive for abdominal distention. Negative for abdominal pain, anal bleeding, blood in stool, diarrhea, nausea and vomiting.   Endocrine: Negative for cold intolerance, heat intolerance and polydipsia.   Genitourinary: Negative for dysuria, scrotal swelling and testicular pain.   Musculoskeletal: Negative for arthralgias, back pain, myalgias and neck pain.   Skin: Negative for pallor and rash.   Allergic/Immunologic: Negative for food allergies and immunocompromised state.   Neurological: Negative for dizziness, syncope, speech difficulty and weakness.   Hematological: Bruises/bleeds easily.   Psychiatric/Behavioral: Positive for decreased concentration and sleep disturbance. Negative for agitation and confusion.     Objective:     Vital Signs (Most Recent):  Temp: 97.4 °F (36.3 °C) (06/24/18 1216)  Pulse: 92 (06/24/18 1216)  Resp: 17 (06/24/18 1216)  BP: (!) 105/52 (06/24/18 1216)  SpO2: 98 % (06/24/18 1216) Vital Signs (24h Range):  Temp:  [97.4  °F (36.3 °C)-98.6 °F (37 °C)] 97.4 °F (36.3 °C)  Pulse:  [71-92] 92  Resp:  [16-18] 17  SpO2:  [94 %-98 %] 98 %  BP: (100-121)/(48-56) 105/52     Weight: (!) 139 kg (306 lb 7 oz)  Body mass index is 39.34 kg/m².    Intake/Output - Last 3 Shifts       06/22 0700 - 06/23 0659 06/23 0700 - 06/24 0659 06/24 0700 - 06/25 0659    P.O. 360 525     I.V. (mL/kg)  10 (0.1)     Other  0     Total Intake(mL/kg) 360 535 (3.8)     Urine (mL/kg/hr) 1300 1520 (0.5)     Emesis/NG output  0 (0)     Other  0 (0)     Stool  0 (0)     Blood  0 (0)     Total Output 1300 1520      Net -940 -985             Urine Occurrence  0 x     Stool Occurrence  0 x 1 x    Emesis Occurrence  0 x           Physical Exam   Constitutional: He is oriented to person, place, and time. He appears well-developed. No distress.   Chronically ill-appearing. Malnourished. Temporal wasting.     HENT:   Head: Normocephalic and atraumatic.   Mouth/Throat: Oropharynx is clear and moist.   Eyes: Conjunctivae are normal. Scleral icterus is present.   Neck: Normal range of motion.   Cardiovascular: Normal rate, regular rhythm, normal heart sounds and intact distal pulses.    No murmur heard.  Pulmonary/Chest: Effort normal and breath sounds normal. No respiratory distress. He has no wheezes. He has no rales.   Abdominal: Soft. Normal appearance and bowel sounds are normal. He exhibits distension. He exhibits no shifting dullness, no pulsatile liver, no fluid wave and no ascites. There is no splenomegaly or hepatomegaly. There is no tenderness. No hernia.   Small ascites, not tense.   Musculoskeletal: He exhibits edema (3+).   Neurological: He is alert and oriented to person, place, and time. He is not disoriented.   Asterixis: present.   Skin: Skin is warm and dry. No rash noted. He is not diaphoretic.   Scattered spider angiomata on upper chest. Palmar erythema.   Psychiatric: He has a normal mood and affect. His behavior is normal. His mood appears not anxious. His  affect is not inappropriate. He is not agitated. He is communicative. He is attentive.   Nursing note and vitals reviewed.      Laboratory:  Immunosuppressants     None        CBC:   Recent Labs  Lab 06/24/18  0631   WBC 2.46*   RBC 2.30*   HGB 7.8*   HCT 22.4*   PLT 47*   MCV 97   MCH 33.9*   MCHC 34.8     CMP:   Recent Labs  Lab 06/24/18  0631   GLU 85   CALCIUM 9.1   ALBUMIN 2.5*   PROT 4.6*      K 4.0   CO2 20*   *   BUN 20   CREATININE 2.0*   ALKPHOS 64   ALT 18   AST 24   BILITOT 2.1*     Labs within the past 24 hours have been reviewed.    Diagnostic Results:  I have personally reviewed all pertinent imaging studies.

## 2018-06-24 NOTE — CONSULTS
"  [facility]  Adult Nutrition  Consult Note    SUMMARY     Recommendations    Recommendation/Intervention:   1. Continue w/ Regular diet.   2..Encourage PO intake >/= 75% EEN/EPN   3. If PO intake is <50% encourage HVP w/ each meal, small frequent meal, snacks, ONS. 4.RD to follow  Goals: pt to consume nutrients >/= 85% EEN/EPN   Nutrition Goal Status: new  Communication of RD Recs:  (POC)    Reason for Assessment    Reason for Assessment: consult  Diagnosis:  (Acute kidney injury superimposed on chronic kidney disease )  Relevant Medical History: DM, Etoh abuse, Cirrhosis, HTN  Interdisciplinary Rounds: did not attend  General Information Comments: poor po intake(~25%) since last admission,(last week) no c/o NVDC. weeping from BLE Pt with +3-4 edema to BLEs and VICKY on CKD3 on routine labs secondary to ETOH cirrhosis w/ ascites. Consulted for Malnutrition. RD feel altered albumin labs likely due to Acities and VICKY. Pt w/ good po intake prior to last admission. wt has remained relatively stable x 3 mo.  Nutrition Discharge Planning: Adequate PO intake    Nutrition Risk Screen    Nutrition Risk Screen: no indicators present    Nutrition/Diet History    Patient Reported Diet/Restrictions/Preferences: low salt  Food Preferences: No coffee, no tea, likes Jell-O  Do you have any cultural, spiritual, Episcopal conflicts, given your current situation?: none  Food Allergies: NKFA  Factors Affecting Nutritional Intake: decreased appetite    Anthropometrics    Temp: 98.2 °F (36.8 °C)  Height: 6' 2" (188 cm)  Height (inches): 74 in  Weight Method: Bed Scale  Weight: (!) 139 kg (306 lb 7 oz)  Weight (lb): (!) 306.44 lb  Ideal Body Weight (IBW), Male: 190 lb  % Ideal Body Weight, Male (lb): 161.28 lb  BMI (Calculated): 39.4  BMI Grade: 35 - 39.9 - obesity - grade II       Lab/Procedures/Meds    Pertinent Labs Reviewed: reviewed  Pertinent Labs Comments: Crt-2.1 (improving), Alb-2.6, GFR-32.7, " T.traci-2.4  Pertinent Medications Reviewed: reviewed  Pertinent Medications Comments: lasix, Mg, heparin, zofran    Physical Findings/Assessment    Overall Physical Appearance: obese  Oral/Mouth Cavity: tooth/teeth missing  Skin: intact    Estimated/Assessed Needs    Weight Used For Calorie Calculations: (!) 138.2 kg (304 lb 10.8 oz)  Energy Calorie Requirements (kcal): 2702 kcal/d  Energy Need Method: [place]-St Jeor  Protein Requirements: 138g/d (1.0g/kg)  Weight Used For Protein Calculations: (!) 138.2 kg (304 lb 10.8 oz)        RDA Method (mL): 2702         Nutrition Prescription Ordered    Current Diet Order: regular    Evaluation of Received Nutrient/Fluid Intake    I/O: -960ml since admit  Energy Calories Required: not meeting needs  Protein Required: not meeting needs  Fluid Required: not meeting needs  Comments: LBM:6/22/18  % Intake of Estimated Energy Needs: 25 - 50 %  % Meal Intake: 25 - 50 %    Nutrition Risk    Level of Risk/Frequency of Follow-up: moderate     Assessment and Plan    Ascites due to alcoholic cirrhosis    Contributing Nutrition Diagnosis  Altered nutritional labs    Related to (etiology):   Ascites, VICKY, ESLD    Signs and Symptoms (as evidenced by):   Crt-2.1, GFR-37.9, T.traci-2.4, Alb-2.6    Interventions/Recommendations (treatment strategy):      Nutrition Diagnosis Status:   New                  Monitor and Evaluation    Food and Nutrient Intake: energy intake, food and beverage intake  Food and Nutrient Administration: diet order  Knowledge/Beliefs/Attitudes: food and nutrition knowledge/skill  Physical Activity and Function: nutrition-related ALS and Aids  Anthropometric Measurements: weight, weight change  Biochemical Data, Medical Tests and Procedures:  (All labs)  Nutrition-Focused Physical Findings: overall appearance, skin     Nutrition Follow-Up    RD Follow-up?: Yes

## 2018-06-24 NOTE — PT/OT/SLP EVAL
Physical Therapy Evaluation    Patient Name:  Alon Adamson   MRN:  [id number]    Recommendations:     Discharge Recommendations:  home health PT   Discharge Equipment Recommendations: none   Barriers to discharge: Inaccessible home (5 VANESSA)    Assessment:     Alon Adamson is a 63 y.o. male admitted with a medical diagnosis of Acute kidney injury superimposed on chronic kidney disease.  He presents with the following impairments/functional limitations:  weakness, gait instability, decreased upper extremity function, impaired cardiopulmonary response to activity, impaired balance, impaired endurance, pain, impaired skin, edema, impaired functional mobilty, impaired self care skills, decreased safety awareness. Pt able to complete functional mobility with CGA. Ambulation limited to stand pivot bed>chair, but with no SOB or LOB noted throughout. Pt with slowed responses and delayed processing throughout session, requiring increased time to answer questions and follow commands. Pt would benefit from skilled acute PT in order to address current deficits and progress functional mobility.     Rehab Prognosis:  Fair-good; patient would benefit from acute skilled PT services to address these deficits and reach maximum level of function.      Recent Surgery: * No surgery found *      Plan:     During this hospitalization, patient to be seen 4 x/week to address the above listed problems via gait training, therapeutic activities, therapeutic exercises, neuromuscular re-education  · Plan of Care Expires:  07/23/18   Plan of Care Reviewed with: patient, son, spouse    Subjective     Communicated with RN prior to session.  Patient found supine upon PT entry to room, agreeable to evaluation.      Chief Complaint: R shoulder pain   Patient comments/goals: return home   Pain/Comfort:  · Pain Rating 1: 10/10  · Location - Side 1: Right  · Location 1: shoulder  · Pain Addressed 1: Reposition, Distraction     Patients  cultural, spiritual, Sabianist conflicts given the current situation: none noted    Living Environment:  Pt lives with his wife and son in a mobile home with 5 [address], R handrail. Pt was using RW for household mobility PTA. Pt was using SPC for community mobility but has not been out in community for ~1 month. Pt uses w/c for longer community distances. Pt required assist with ADLs PTA.   Prior to admission, patients level of function was requiring DME and assist.  Patient has the following equipment: wheelchair, walker, rolling, cane, straight, bedside commode, hospital bed.  Upon discharge, patient will have assistance from family.    Objective:     Patient found with: telemetry     General Precautions: Standard, fall   Orthopedic Precautions:N/A   Braces: N/A     Exams:  · Cognitive Exam:  Patient is oriented to Person and Place and follows one-step commands; delayed processing with slowed responses noted  · Sensation:    · -       Intact  · Skin Integrity/Edema:      · -       Skin integrity: dry and red BLE  · -       Edema: BLE  · RLE ROM: WFL  · RLE Strength: WFL  · LLE ROM: WFL  · LLE Strength: WFL    Functional Mobility:  · Bed Mobility:     · Supine to Sit: contact guard assistance and with HOB elevated and side rail  · Transfers:     · Sit to Stand:  contact guard assistance with rolling walker and x1 rep from elevated bed, x1 rep from elevated chair   · Bed to Chair: contact guard assistance with  rolling walker  using  Stand Pivot  · Gait: ~3 ft. stand pivot bed>chair with RW and CGA  · Decreased step length, slowed antoine  · Cues for sequencing and safety awareness     AM-PAC 6 CLICK MOBILITY  Total Score:17       Therapeutic Activities and Exercises:   Pt educated on role of PT and PT POC. Pt verbalized understanding.   Required increased time to complete pt interview and mobility 2* pt with slowed responses.  Pt encouraged to sit UIC for as much of day as tolerated. Pt oriented to call bell and  educated that he must have staff assist for all standing tasks/transfers. Pt v/u.   RN notified of pt status and level of assist needed for transfers     Patient left up in chair with all lines intact, call button in reach, RN notified and pt's family present.    GOALS:    Physical Therapy Goals        Problem: Physical Therapy Goal    Goal Priority Disciplines Outcome Goal Variances Interventions   Physical Therapy Goal     PT/OT, PT Ongoing (interventions implemented as appropriate)     Description:  Goals to be met by: 18     Patient will increase functional independence with mobility by performin. Supine to sit with Stand-by Assistance  2. Sit to stand transfer with Stand-by Assistance  3. Gait  x 100 feet with Contact Guard Assistance using Rolling Walker.   4. Ascend/descend 5 stair with right Handrails Contact Guard Assistance.   5. Lower extremity exercise program x15 reps, with supervision, in order to increase LE strength and (I) with functional mobility.                       History:     Past Medical History:   Diagnosis Date    Anticoagulant long-term use     Arthritis     Back pain     Cellulitis     Cirrhosis     Coronary artery disease     DM (diabetes mellitus)     Hearing loss     right ear    Hepatic encephalopathy     Hypertension     diagnosed today (2015) and prescribed toprol    Leg edema     Nephrolithiasis     JACK (obstructive sleep apnea)     Seizures     Splenomegaly        Past Surgical History:   Procedure Laterality Date    APPENDECTOMY      Open    BACK SURGERY      CHOLECYSTECTOMY      laprascopic    COLONOSCOPY N/A 2018    Procedure: COLONOSCOPY;  Surgeon: Lashawn Myles MD;  Location: [address];  Service: Endoscopy;  Laterality: N/A;    LUMBAR DISCECTOMY      SPLENIC ARTERY EMBOLIZATION  2016    Dr Taveras    TONSILLECTOMY         Clinical Decision Making:     History  Co-morbidities and personal factors that may impact  the plan of care Examination  Body Structures and Functions, activity limitations and participation restrictions that may impact the plan of care Clinical Presentation   Decision Making/ Complexity Score   Co-morbidities:   [] Time since onset of injury / illness / exacerbation  [x] Status of current condition  []Patient's cognitive status and safety concerns    [x] Multiple Medical Problems (see med hx)  Personal Factors:   [] Patient's age  [] Prior Level of function   [x] Patient's home situation (environment and family support)  [] Patient's level of motivation  [] Expected progression of patient      HISTORY:(criteria)    [] 02914 - no personal factors/history    [] 86472 - has 1-2 personal factor/comorbidity     [x] 18081 - has >3 personal factor/comorbidity     Body Regions:  [] Objective examination findings  [] Head     []  Neck  [] Trunk   [] Upper Extremity  [] Lower Extremity    Body Systems:  [] For communication ability, affect, cognition, language, and learning style: the assessment of the ability to make needs known, consciousness, orientation (person, place, and time), expected emotional /behavioral responses, and learning preferences (eg, learning barriers, education  needs)  [x] For the neuromuscular system: a general assessment of gross coordinated movement (eg, balance, gait, locomotion, transfers, and transitions) and motor function  (motor control and motor learning)  [x] For the musculoskeletal system: the assessment of gross symmetry, gross range of motion, gross strength, height, and weight  [x] For the integumentary system: the assessment of pliability(texture), presence of scar formation, skin color, and skin integrity  [] For cardiovascular/pulmonary system: the assessment of heart rate, respiratory rate, blood pressure, and edema     Activity limitations:    [] Patient's cognitive status and saf ety concerns          [x] Status of current condition      [] Weight bearing restriction  []  Cardiopulmunary Restriction    Participation Restrictions:   [] Goals and goal agreement with the patient     [] Rehab potential (prognosis) and probable outcome      Examination of Body System: (criteria)    [] 36342 - addressing 1-2 elements    [] 37877 - addressing a total of 3 or more elements     [x] 81993 -  Addressing a total of 4 or more elements         Clinical Presentation: (criteria)  Stable - 47409     On examination of body system using standardized tests and measures patient presents with 4 or more elements from any of the following: body structures and functions, activity limitations, and/or participation restrictions.  Leading to a clinical presentation that is considered stable and/or uncomplicated                              Clinical Decision Making  (Eval Complexity):  Low- 21376     Time Tracking:     PT Received On: 06/24/18  PT Start Time: 1011     PT Stop Time: 1050  PT Total Time (min): 39 min     Billable Minutes: Evaluation 31 and Therapeutic Activity 8    Lucille Craig, PT, DPT   6/25/2018  [phone]

## 2018-06-24 NOTE — PLAN OF CARE
Problem: Physical Therapy Goal  Goal: Physical Therapy Goal  Goals to be met by: 18     Patient will increase functional independence with mobility by performin. Supine to sit with Stand-by Assistance  2. Sit to stand transfer with Stand-by Assistance  3. Gait  x 100 feet with Contact Guard Assistance using Rolling Walker.   4. Ascend/descend 5 stair with right Handrails Contact Guard Assistance.   5. Lower extremity exercise program x15 reps, with supervision, in order to increase LE strength and (I) with functional mobility.     Outcome: Ongoing (interventions implemented as appropriate)  PT evaluation complete and appropriate goals established.    Lucille Craig, PT, DPT   2018  [phone]

## 2018-06-24 NOTE — ASSESSMENT & PLAN NOTE
Contributing Nutrition Diagnosis  Altered nutritional labs    Related to (etiology):   Ascities, VICKY, ESLD    Signs and Symptoms (as evidenced by):   Crt-2.1, GFR-37.9, T.traci-2.4, Alb-2.6    Interventions/Recommendations (treatment strategy):      Nutrition Diagnosis Status:   New

## 2018-06-24 NOTE — PLAN OF CARE
Problem: Patient Care Overview  Goal: Plan of Care Review  Recommendations     Recommendation/Intervention:   1. Continue w/ Regular diet.   2..Encourage PO intake >/= 75% EEN/EPN   3. If PO intake is <50% encourage HVP w/ each meal, small frequent meal, snacks, ONS. 4.RD to follow  Goals: pt to consume nutrients >/= 85% EEN/EPN   Nutrition Goal Status: new  Communication of RD Recs:  (POC)  Assessment and Plan         Ascites due to alcoholic cirrhosis     Contributing Nutrition Diagnosis  Altered nutritional labs     Related to (etiology):   Ascites, VICKY, ESLD     Signs and Symptoms (as evidenced by):   Crt-2.1, GFR-37.9, T.traci-2.4, Alb-2.6     Interventions/Recommendations (treatment strategy):        Nutrition Diagnosis Status:   New

## 2018-06-24 NOTE — PROGRESS NOTES
[facility]  Liver Transplant  Progress Note    Patient Name: Alon Adamson  MRN: [id number]  Admission Date: 6/22/2018  Hospital Length of Stay: 2 days  Code Status: Full Code  Primary Care Provider: Mckinley Vides MD    Subjective:     History of Present Illness:  Mr. 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  Complications of liver disease include hyperbilirubinemia, hypoalbuminemia, severe malnutrition,   hepatic encephalopathy, thrombocytopenia, splenomegaly, ascites, hypervolemia, coagulopathy, portal HTN.  Pt recently admitted for severe hepatic encephalopathy requiring intubation but has been stable from mentation standpoint post resolution of acute enceophalopathy.      Mr. Adamson was seen in clinic today and was noted to have significant hypervolemia, weeping from BLE, and VICKY on CKD3 on routine labs.  His MELD increased from 22 to 26 on todays labs- coordinator notified and MELD updated.  Cr elevated to 2.3 from baseline of 1.4.  Also with worsening ascites.  Pt reports increased continued pruritis, generalized fatigue and weakness.  He presented today in wheelchair.  ROS otherwise negative.  No s/s active infection.      Hospital Course:  6/23/18: Admitted on 6/22 with VICKY, anasarca, blistering edema in LE. Given 60 mg furosemide IV and albumin infusion. UOP:1300ml. Net: neg 940 ml. Cr: improving 2.1 (from 2.3, his baseline was 1.5). Patient c/o leg edema, itching.   6/24/18: No acute events, has not had BM and more shaky today, was sitting up and having lunch during rounds, AAO x 3. UOP overnight 1.5 L - diuresing well, leg edema/anasarca improving but still 2-3+ edema, left LE some blistering but not weeping. Cr improving 2 (from 2.4), continue diuresis/albumin, increase lactulose until he has a BM.    Scheduled Meds:   albumin human 25%  25 g Intravenous BID    famotidine  20 mg Oral BID    furosemide  60 mg Intravenous Once    heparin (porcine)   5,000 Units Subcutaneous Q8H    lactulose  20 g Oral QID WAKE    levETIRAcetam  500 mg Oral BID    magnesium oxide  400 mg Oral Daily    rifAXImin  550 mg Oral BID    spironolactone  50 mg Oral Daily     Continuous Infusions:  PRN Meds:acetaminophen, diphenhydrAMINE-zinc acetate 1-0.1%, hydrOXYzine HCl, ondansetron, sodium chloride 0.9%, traZODone    Review of Systems   Constitutional: Positive for fatigue and unexpected weight change. Negative for activity change, appetite change, chills, diaphoresis and fever.   HENT: Negative for congestion, sinus pressure, sneezing and sore throat.    Eyes: Negative for pain, redness and visual disturbance.   Respiratory: Negative for cough, shortness of breath and wheezing.    Cardiovascular: Positive for leg swelling. Negative for chest pain and palpitations.   Gastrointestinal: Positive for abdominal distention. Negative for abdominal pain, anal bleeding, blood in stool, diarrhea, nausea and vomiting.   Endocrine: Negative for cold intolerance, heat intolerance and polydipsia.   Genitourinary: Negative for dysuria, scrotal swelling and testicular pain.   Musculoskeletal: Negative for arthralgias, back pain, myalgias and neck pain.   Skin: Negative for pallor and rash.   Allergic/Immunologic: Negative for food allergies and immunocompromised state.   Neurological: Negative for dizziness, syncope, speech difficulty and weakness.   Hematological: Bruises/bleeds easily.   Psychiatric/Behavioral: Positive for decreased concentration and sleep disturbance. Negative for agitation and confusion.     Objective:     Vital Signs (Most Recent):  Temp: 97.4 °F (36.3 °C) (06/24/18 1216)  Pulse: 92 (06/24/18 1216)  Resp: 17 (06/24/18 1216)  BP: (!) 105/52 (06/24/18 1216)  SpO2: 98 % (06/24/18 1216) Vital Signs (24h Range):  Temp:  [97.4 °F (36.3 °C)-98.6 °F (37 °C)] 97.4 °F (36.3 °C)  Pulse:  [71-92] 92  Resp:  [16-18] 17  SpO2:  [94 %-98 %] 98 %  BP: (100-121)/(48-56) 105/52      Weight: (!) 139 kg (306 lb 7 oz)  Body mass index is 39.34 kg/m².    Intake/Output - Last 3 Shifts       06/22 0700 - 06/23 0659 06/23 0700 - 06/24 0659 06/24 0700 - 06/25 0659    P.O. 360 525     I.V. (mL/kg)  10 (0.1)     Other  0     Total Intake(mL/kg) 360 535 (3.8)     Urine (mL/kg/hr) 1300 1520 (0.5)     Emesis/NG output  0 (0)     Other  0 (0)     Stool  0 (0)     Blood  0 (0)     Total Output 1300 1520      Net -940 -985             Urine Occurrence  0 x     Stool Occurrence  0 x 1 x    Emesis Occurrence  0 x           Physical Exam   Constitutional: He is oriented to person, place, and time. He appears well-developed. No distress.   Chronically ill-appearing. Malnourished. Temporal wasting.     HENT:   Head: Normocephalic and atraumatic.   Mouth/Throat: Oropharynx is clear and moist.   Eyes: Conjunctivae are normal. Scleral icterus is present.   Neck: Normal range of motion.   Cardiovascular: Normal rate, regular rhythm, normal heart sounds and intact distal pulses.    No murmur heard.  Pulmonary/Chest: Effort normal and breath sounds normal. No respiratory distress. He has no wheezes. He has no rales.   Abdominal: Soft. Normal appearance and bowel sounds are normal. He exhibits distension. He exhibits no shifting dullness, no pulsatile liver, no fluid wave and no ascites. There is no splenomegaly or hepatomegaly. There is no tenderness. No hernia.   Small ascites, not tense.   Musculoskeletal: He exhibits edema (3+).   Neurological: He is alert and oriented to person, place, and time. He is not disoriented.   Asterixis: present.   Skin: Skin is warm and dry. No rash noted. He is not diaphoretic.   Scattered spider angiomata on upper chest. Palmar erythema.   Psychiatric: He has a normal mood and affect. His behavior is normal. His mood appears not anxious. His affect is not inappropriate. He is not agitated. He is communicative. He is attentive.   Nursing note and vitals  reviewed.      Laboratory:  Immunosuppressants     None        CBC:   Recent Labs  Lab 06/24/18  0631   WBC 2.46*   RBC 2.30*   HGB 7.8*   HCT 22.4*   PLT 47*   MCV 97   MCH 33.9*   MCHC 34.8     CMP:   Recent Labs  Lab 06/24/18  0631   GLU 85   CALCIUM 9.1   ALBUMIN 2.5*   PROT 4.6*      K 4.0   CO2 20*   *   BUN 20   CREATININE 2.0*   ALKPHOS 64   ALT 18   AST 24   BILITOT 2.1*     Labs within the past 24 hours have been reviewed.    Diagnostic Results:  I have personally reviewed all pertinent imaging studies.    Assessment/Plan:     * Acute kidney injury superimposed on chronic kidney disease    - VICKY improving with albumin infusion  - Ur Na: 32, unlikely for HRS  - continue diuresis and monitor          Bilateral edema of lower extremity    IV furosemide, spironolactone PO and albumin infusion  Pt with appearance of venous stasis with signif blistering/weeping  Consulted wound care  Will likely need BLE ABIs          Physical deconditioning    - PT consulted          Ascites due to alcoholic cirrhosis    See BLE edema          Coagulopathy    Secondary to decompensated liver disease          Cholestatic pruritus    - Atarax ordered          Hypoalbuminemia due to protein-calorie malnutrition    - Dietary consulted          Decompensated hepatic cirrhosis    Listed for liver txp - MELD updated to 26 today  MELD-Na score: 26 at 6/22/2018 10:44 AM  MELD score: 24 at 6/22/2018 10:44 AM  Calculated from:  Serum Creatinine: 2.3 mg/dL at 6/22/2018 10:44 AM  Serum Sodium: 134 mmol/L at 6/22/2018 10:44 AM  Total Bilirubin: 2.2 mg/dL at 6/22/2018 10:44 AM  INR(ratio): 1.8 at 6/22/2018 10:44 AM  Age: 63 years            Thrombocytopenia    Secondary to splenomegaly from portal HTN- should improve with txp  No s/s overt bleed          Hepatic encephalopathy    Acute on chronic- grade 2  - increase lactulose to 20 g QID, continue rifaximin 550 mg BID              VTE Risk Mitigation         Ordered     heparin  (porcine) injection 5,000 Units  Every 8 hours      06/22/18 1502     IP VTE HIGH RISK PATIENT  Once      06/22/18 1502          The patients clinical status was discussed at multidisplinary rounds, involving transplant surgery, transplant medicine, pharmacy, nursing, nutrition, and social work    Discharge Planning:  No Patient Care Coordination Note on file.      Tammie Mathur MD  Liver Transplant  [facility]

## 2018-06-25 PROBLEM — L30.8 DERMATITIS ASSOCIATED WITH MOISTURE: Status: ACTIVE | Noted: 2018-06-25

## 2018-06-25 PROBLEM — L85.3 XEROSIS OF SKIN: Status: ACTIVE | Noted: 2018-06-25

## 2018-06-25 PROBLEM — L03.115 CELLULITIS OF BOTH LOWER EXTREMITIES: Status: ACTIVE | Noted: 2018-06-25

## 2018-06-25 PROBLEM — L03.116 CELLULITIS OF BOTH LOWER EXTREMITIES: Status: ACTIVE | Noted: 2018-06-25

## 2018-06-25 LAB
ALBUMIN SERPL BCP-MCNC: 3 G/DL
ALP SERPL-CCNC: 66 U/L
ALT SERPL W/O P-5'-P-CCNC: 23 U/L
ANION GAP SERPL CALC-SCNC: 11 MMOL/L
APPEARANCE FLD: CLEAR
AST SERPL-CCNC: 27 U/L
BASOPHILS # BLD AUTO: 0.05 K/UL
BASOPHILS NFR BLD: 1.1 %
BILIRUB SERPL-MCNC: 3 MG/DL
BODY FLD TYPE: ABNORMAL
BUN SERPL-MCNC: 22 MG/DL
CALCIUM SERPL-MCNC: 9.4 MG/DL
CHLORIDE SERPL-SCNC: 111 MMOL/L
CO2 SERPL-SCNC: 18 MMOL/L
COLOR FLD: YELLOW
CREAT SERPL-MCNC: 1.9 MG/DL
DIFFERENTIAL METHOD: ABNORMAL
EOSINOPHIL # BLD AUTO: 0.4 K/UL
EOSINOPHIL NFR BLD: 8.1 %
EOSINOPHIL NFR FLD MANUAL: 3 %
ERYTHROCYTE [DISTWIDTH] IN BLOOD BY AUTOMATED COUNT: 17.6 %
EST. GFR  (AFRICAN AMERICAN): 42.7 ML/MIN/1.73 M^2
EST. GFR  (NON AFRICAN AMERICAN): 37 ML/MIN/1.73 M^2
GLUCOSE SERPL-MCNC: 121 MG/DL
GRAM STN SPEC: NORMAL
HCT VFR BLD AUTO: 25.1 %
HGB BLD-MCNC: 8.7 G/DL
IMM GRANULOCYTES # BLD AUTO: 0.01 K/UL
IMM GRANULOCYTES NFR BLD AUTO: 0.2 %
INR PPP: 1.9
LYMPHOCYTES # BLD AUTO: 0.6 K/UL
LYMPHOCYTES NFR BLD: 13 %
LYMPHOCYTES NFR FLD MANUAL: 52 %
MAGNESIUM SERPL-MCNC: 1.7 MG/DL
MCH RBC QN AUTO: 33.6 PG
MCHC RBC AUTO-ENTMCNC: 34.7 G/DL
MCV RBC AUTO: 97 FL
MONOCYTES # BLD AUTO: 0.7 K/UL
MONOCYTES NFR BLD: 15 %
MONOS+MACROS NFR FLD MANUAL: 20 %
NEUTROPHILS # BLD AUTO: 2.8 K/UL
NEUTROPHILS NFR BLD: 62.6 %
NEUTROPHILS NFR FLD MANUAL: 23 %
NRBC BLD-RTO: 0 /100 WBC
OTHER CELLS FLD MANUAL: 2 %
PLATELET # BLD AUTO: 56 K/UL
PMV BLD AUTO: 12.4 FL
POTASSIUM SERPL-SCNC: 4.1 MMOL/L
PROT SERPL-MCNC: 5.2 G/DL
PROTHROMBIN TIME: 18.7 SEC
RBC # BLD AUTO: 2.59 M/UL
SODIUM SERPL-SCNC: 140 MMOL/L
WBC # BLD AUTO: 4.54 K/UL
WBC # FLD: 59 /CU MM

## 2018-06-25 PROCEDURE — 0W9G30Z DRAINAGE OF PERITONEAL CAVITY WITH DRAINAGE DEVICE, PERCUTANEOUS APPROACH: ICD-10-PCS | Performed by: RADIOLOGY

## 2018-06-25 PROCEDURE — 63600175 PHARM REV CODE 636 W HCPCS: Mod: NTX | Performed by: TRANSPLANT SURGERY

## 2018-06-25 PROCEDURE — 87205 SMEAR GRAM STAIN: CPT | Mod: NTX

## 2018-06-25 PROCEDURE — 85025 COMPLETE CBC W/AUTO DIFF WBC: CPT | Mod: NTX

## 2018-06-25 PROCEDURE — 93922 UPR/L XTREMITY ART 2 LEVELS: CPT | Mod: NTX | Performed by: SURGERY

## 2018-06-25 PROCEDURE — 99233 SBSQ HOSP IP/OBS HIGH 50: CPT | Mod: NTX,,, | Performed by: PHYSICIAN ASSISTANT

## 2018-06-25 PROCEDURE — 89051 BODY FLUID CELL COUNT: CPT | Mod: NTX

## 2018-06-25 PROCEDURE — 80053 COMPREHEN METABOLIC PANEL: CPT | Mod: NTX

## 2018-06-25 PROCEDURE — 87040 BLOOD CULTURE FOR BACTERIA: CPT | Mod: 59,NTX

## 2018-06-25 PROCEDURE — 63600175 PHARM REV CODE 636 W HCPCS: Mod: JG,NTX | Performed by: INTERNAL MEDICINE

## 2018-06-25 PROCEDURE — 83735 ASSAY OF MAGNESIUM: CPT | Mod: NTX

## 2018-06-25 PROCEDURE — 25000003 PHARM REV CODE 250: Mod: NTX | Performed by: PHYSICIAN ASSISTANT

## 2018-06-25 PROCEDURE — P9047 ALBUMIN (HUMAN), 25%, 50ML: HCPCS | Mod: JG,NTX | Performed by: INTERNAL MEDICINE

## 2018-06-25 PROCEDURE — 36415 COLL VENOUS BLD VENIPUNCTURE: CPT | Mod: NTX

## 2018-06-25 PROCEDURE — P9047 ALBUMIN (HUMAN), 25%, 50ML: HCPCS | Mod: JG,NTX | Performed by: PHYSICIAN ASSISTANT

## 2018-06-25 PROCEDURE — 25000003 PHARM REV CODE 250: Mod: NTX | Performed by: INTERNAL MEDICINE

## 2018-06-25 PROCEDURE — 20600001 HC STEP DOWN PRIVATE ROOM: Mod: NTX

## 2018-06-25 PROCEDURE — 85610 PROTHROMBIN TIME: CPT | Mod: NTX

## 2018-06-25 PROCEDURE — 25000003 PHARM REV CODE 250: Mod: NTX | Performed by: TRANSPLANT SURGERY

## 2018-06-25 PROCEDURE — 63600175 PHARM REV CODE 636 W HCPCS: Mod: JG,NTX | Performed by: PHYSICIAN ASSISTANT

## 2018-06-25 PROCEDURE — 87070 CULTURE OTHR SPECIMN AEROBIC: CPT | Mod: NTX

## 2018-06-25 RX ORDER — LACTULOSE 10 G/15ML
20 SOLUTION ORAL EVERY 6 HOURS
Status: DISCONTINUED | OUTPATIENT
Start: 2018-06-25 | End: 2018-06-25

## 2018-06-25 RX ORDER — VANCOMYCIN HYDROCHLORIDE
1500
Status: DISCONTINUED | OUTPATIENT
Start: 2018-06-25 | End: 2018-06-25

## 2018-06-25 RX ORDER — LACTULOSE 10 G/15ML
30 SOLUTION ORAL EVERY 6 HOURS
Status: DISCONTINUED | OUTPATIENT
Start: 2018-06-26 | End: 2018-06-28

## 2018-06-25 RX ORDER — LACTULOSE 10 G/15ML
200 SOLUTION ORAL; RECTAL ONCE
Status: COMPLETED | OUTPATIENT
Start: 2018-06-25 | End: 2018-06-25

## 2018-06-25 RX ORDER — CEFTRIAXONE 1 G/1
1 INJECTION, POWDER, FOR SOLUTION INTRAMUSCULAR; INTRAVENOUS
Status: DISCONTINUED | OUTPATIENT
Start: 2018-06-25 | End: 2018-06-28

## 2018-06-25 RX ORDER — LACTULOSE 10 G/15ML
200 SOLUTION ORAL; RECTAL EVERY 6 HOURS PRN
Status: DISCONTINUED | OUTPATIENT
Start: 2018-06-25 | End: 2018-07-25

## 2018-06-25 RX ORDER — LACTULOSE 10 G/15ML
30 SOLUTION ORAL ONCE
Status: COMPLETED | OUTPATIENT
Start: 2018-06-25 | End: 2018-06-25

## 2018-06-25 RX ORDER — ALBUMIN HUMAN 250 G/1000ML
25 SOLUTION INTRAVENOUS 2 TIMES DAILY
Status: DISPENSED | OUTPATIENT
Start: 2018-06-25 | End: 2018-06-26

## 2018-06-25 RX ADMIN — LACTULOSE 30 G: 20 SOLUTION ORAL at 09:06

## 2018-06-25 RX ADMIN — LEVETIRACETAM 500 MG: 500 TABLET ORAL at 10:06

## 2018-06-25 RX ADMIN — HEPARIN SODIUM 5000 UNITS: 5000 INJECTION, SOLUTION INTRAVENOUS; SUBCUTANEOUS at 02:06

## 2018-06-25 RX ADMIN — MAGNESIUM OXIDE TAB 400 MG (241.3 MG ELEMENTAL MG) 400 MG: 400 (241.3 MG) TAB at 08:06

## 2018-06-25 RX ADMIN — LACTULOSE 20 G: 20 SOLUTION ORAL at 07:06

## 2018-06-25 RX ADMIN — CEFTRIAXONE SODIUM 1 G: 1 INJECTION, POWDER, FOR SOLUTION INTRAMUSCULAR; INTRAVENOUS at 03:06

## 2018-06-25 RX ADMIN — SPIRONOLACTONE 50 MG: 50 TABLET, FILM COATED ORAL at 08:06

## 2018-06-25 RX ADMIN — FAMOTIDINE 20 MG: 20 TABLET ORAL at 10:06

## 2018-06-25 RX ADMIN — HEPARIN SODIUM 5000 UNITS: 5000 INJECTION, SOLUTION INTRAVENOUS; SUBCUTANEOUS at 05:06

## 2018-06-25 RX ADMIN — ALBUMIN HUMAN 25 G: 0.25 SOLUTION INTRAVENOUS at 10:06

## 2018-06-25 RX ADMIN — LACTULOSE 20 G: 20 SOLUTION ORAL at 08:06

## 2018-06-25 RX ADMIN — RIFAXIMIN 550 MG: 550 TABLET ORAL at 08:06

## 2018-06-25 RX ADMIN — FAMOTIDINE 20 MG: 20 TABLET ORAL at 08:06

## 2018-06-25 RX ADMIN — VANCOMYCIN HYDROCHLORIDE 1500 MG: 1 INJECTION, POWDER, LYOPHILIZED, FOR SOLUTION INTRAVENOUS at 01:06

## 2018-06-25 RX ADMIN — LACTULOSE 20 G: 20 SOLUTION ORAL at 10:06

## 2018-06-25 RX ADMIN — HEPARIN SODIUM 5000 UNITS: 5000 INJECTION, SOLUTION INTRAVENOUS; SUBCUTANEOUS at 10:06

## 2018-06-25 RX ADMIN — RIFAXIMIN 550 MG: 550 TABLET ORAL at 10:06

## 2018-06-25 RX ADMIN — ALBUMIN HUMAN 25 G: 0.25 SOLUTION INTRAVENOUS at 08:06

## 2018-06-25 RX ADMIN — LEVETIRACETAM 500 MG: 500 TABLET ORAL at 08:06

## 2018-06-25 RX ADMIN — LACTULOSE 200 G: 10 SOLUTION ORAL at 12:06

## 2018-06-25 NOTE — PT/OT/SLP PROGRESS
Physical Therapy      Patient Name:  Alon Adamson   MRN:  [id number]    Patient not seen today secondary to  (Hold this date per RN 2* pt encephalopathic and not following commands.). Will follow-up at next scheduled session as able.    Lucille Craig, PT, DPT   6/25/2018  [phone]

## 2018-06-25 NOTE — SUBJECTIVE & OBJECTIVE
Scheduled Meds:   albumin human 25%  25 g Intravenous BID    cefTRIAXone (ROCEPHIN) IVPB  1 g Intravenous Q24H    famotidine  20 mg Oral BID    heparin (porcine)  5,000 Units Subcutaneous Q8H    lactulose  20 g Oral Q6H    levETIRAcetam  500 mg Oral BID    magnesium oxide  400 mg Oral Daily    rifAXImin  550 mg Oral BID    vancomycin (VANCOCIN) IVPB  1,500 mg Intravenous Q12H     Continuous Infusions:  PRN Meds:acetaminophen, diphenhydrAMINE-zinc acetate 1-0.1%, hydrOXYzine HCl, ondansetron, sodium chloride 0.9%    Review of Systems   Constitutional: Positive for fatigue and unexpected weight change. Negative for activity change, appetite change, chills, diaphoresis and fever.   HENT: Negative for congestion, sinus pressure, sneezing and sore throat.    Eyes: Negative for pain, redness and visual disturbance.   Respiratory: Negative for cough, shortness of breath and wheezing.    Cardiovascular: Positive for leg swelling. Negative for chest pain and palpitations.   Gastrointestinal: Positive for abdominal distention. Negative for abdominal pain, anal bleeding, blood in stool, diarrhea, nausea and vomiting.   Endocrine: Negative for cold intolerance, heat intolerance and polydipsia.   Genitourinary: Negative for dysuria, scrotal swelling and testicular pain.   Musculoskeletal: Negative for arthralgias, back pain, myalgias and neck pain.   Skin: Negative for pallor and rash.   Allergic/Immunologic: Negative for food allergies and immunocompromised state.   Neurological: Negative for dizziness, syncope, speech difficulty and weakness.   Hematological: Bruises/bleeds easily.   Psychiatric/Behavioral: Positive for confusion, decreased concentration and sleep disturbance. Negative for agitation.     Objective:     Vital Signs (Most Recent):  Temp: 98 °F (36.7 °C) (06/25/18 1147)  Pulse: 107 (06/25/18 1452)  Resp: 16 (06/25/18 1452)  BP: (!) 145/65 (06/25/18 1452)  SpO2: 100 % (06/25/18 1452) Vital Signs (24h  Range):  Temp:  [97.7 °F (36.5 °C)-98.6 °F (37 °C)] 98 °F (36.7 °C)  Pulse:  [] 107  Resp:  [16] 16  SpO2:  [95 %-100 %] 100 %  BP: (106-145)/(53-70) 145/65     Weight: (!) 139 kg (306 lb 7 oz)  Body mass index is 39.34 kg/m².    Intake/Output - Last 3 Shifts       06/23 0700 - 06/24 0659 06/24 0700 - 06/25 0659 06/25 0700 - 06/26 0659    P.O. 525 1620     I.V. (mL/kg) 10 (0.1) 10 (0.1)     Other 0 0     Total Intake(mL/kg) 535 (3.8) 1630 (11.7)     Urine (mL/kg/hr) 1520 (0.5) 840 (0.3)     Emesis/NG output 0 (0) 0 (0) 100 (0.1)    Other 0 (0) 0 (0)     Stool 0 (0) 0 (0)     Blood 0 (0) 0 (0)     Total Output 1520 840 100    Net -985 +790 -100           Urine Occurrence 0 x 1 x     Stool Occurrence 0 x 3 x     Emesis Occurrence 0 x 0 x           Physical Exam   Constitutional: He is oriented to person, place, and time. He appears well-developed. No distress.   Chronically ill-appearing. Malnourished. Temporal wasting.     HENT:   Head: Normocephalic and atraumatic.   Mouth/Throat: Oropharynx is clear and moist.   Eyes: Conjunctivae are normal. Scleral icterus is present.   Neck: Normal range of motion.   Cardiovascular: Normal rate, regular rhythm, normal heart sounds and intact distal pulses.    No murmur heard.  Pulmonary/Chest: Effort normal and breath sounds normal. No respiratory distress. He has no wheezes. He has no rales.   Abdominal: Soft. Normal appearance and bowel sounds are normal. He exhibits distension. He exhibits no shifting dullness, no pulsatile liver, no fluid wave and no ascites. There is no splenomegaly or hepatomegaly. There is no tenderness. No hernia.   Small ascites, not tense.   Musculoskeletal: He exhibits edema (3+).   Neurological: He is alert and oriented to person, place, and time. He is not disoriented.   Asterixis: present.   Skin: Skin is warm and dry. No rash noted. He is not diaphoretic. There is erythema.   Scattered spider angiomata on upper chest. Palmar  erythema.  Medial BLE erythema; blistering on distal BLE    Psychiatric: He has a normal mood and affect. His behavior is normal. His mood appears not anxious. His affect is not inappropriate. He is not agitated. He is communicative. He is attentive.   Nursing note and vitals reviewed.      Laboratory:  Immunosuppressants     None        CBC:     Recent Labs  Lab 06/25/18  0658   WBC 4.54   RBC 2.59*   HGB 8.7*   HCT 25.1*   PLT 56*   MCV 97   MCH 33.6*   MCHC 34.7     CMP:     Recent Labs  Lab 06/25/18  0658   *   CALCIUM 9.4   ALBUMIN 3.0*   PROT 5.2*      K 4.1   CO2 18*   *   BUN 22   CREATININE 1.9*   ALKPHOS 66   ALT 23   AST 27   BILITOT 3.0*     Labs within the past 24 hours have been reviewed.    Diagnostic Results:  I have personally reviewed all pertinent imaging studies.

## 2018-06-25 NOTE — PROGRESS NOTES
Consulted for BUE and BLE  Erythema noted to bilateral upper extremities , trunk and inner thighs. Severe dry scaly xerosis noted to BLE as documented in the phot below:         06/25/18 1600       Wound 06/09/18 0100 Other (comment) lower Leg   Date First Assessed/Time First Assessed: 06/09/18 0100   Pre-existing: Yes  Primary Wound Type: (c) Other (comment)  Side: (c)   Orientation: lower  Location: Leg   Wound Image    Wound WDL ex   Dressing Appearance Open to air;Dry;No dressing   Drainage Amount None   Appearance Dry;Other (see comments)  (scaly xerosis to BLE)   Care Applied:;Moisturizing agent  (Sween 24 moisturizer )       Wound 06/09/18 0100 Moisture associated dermatitis Buttocks   Date First Assessed/Time First Assessed: 06/09/18 0100   Pre-existing: Yes  Primary Wound Type: Moisture associated dermatitis  Location: Buttocks   Wound WDL ex   Dressing Appearance Open to air;No dressing   Drainage Amount None   Appearance Red;Moist   Periwound Area Intact   Care Cleansed with:;Soap and water;Applied:;Skin Barrier     Recommendations:   1. Apply Sween 24 hour moisturizer to all extremities twice daily  2. Apply critic aid clear to gluteal crease BID    Ricike Davis RN CWON  [id number]

## 2018-06-25 NOTE — PROGRESS NOTES
Patient noted to be trying to get OOB with legs over the foot of the bed.  4 person assist to get patient back into bed in lying position.  Bed alarm on.  Patient encephalopathic and not following commands to stay in bed.  Telesitter ordered and Camera 01 in room.  Tech notified and monitoring initiated.  Safety measure in place, siderails up x 3, bed alarm, bed in low position, door opened, and call bell in reach.  Will continue to monitor patient.

## 2018-06-25 NOTE — ASSESSMENT & PLAN NOTE
MELD-Na score: 24 at 6/25/2018  6:58 AM  MELD score: 24 at 6/25/2018  6:58 AM  Calculated from:  Serum Creatinine: 1.9 mg/dL at 6/25/2018  6:58 AM  Serum Sodium: 140 mmol/L (Rounded to 137) at 6/25/2018  6:58 AM  Total Bilirubin: 3 mg/dL at 6/25/2018  6:58 AM  INR(ratio): 1.9 at 6/25/2018  6:58 AM  Age: 63 years

## 2018-06-25 NOTE — PROCEDURES
Radiology Post-Procedure Note    Pre Op Diagnosis: Ascites  Post Op Diagnosis: Same    Procedure: Paracentesis    Procedure performed by:lAvin Morales  Staff:  Dr. Purcell.    Written Informed Consent Obtained: Yes  Specimen Removed: YES   Estimated Blood Loss: Minimal    Findings:   Successful paracentesis.  Albumin administered PRN per protocol.    Patient tolerated procedure well.    Alvin Morales  Radiology

## 2018-06-25 NOTE — ASSESSMENT & PLAN NOTE
Acute on chronic- grade 3  - lactulose enema ordered + additional PO dose once ok to swallow, continue rifaximin 550 mg BID  - infx workup initiated: blood/urine/ascites  - Start empiric abx: cefepime/vanc

## 2018-06-25 NOTE — ASSESSMENT & PLAN NOTE
IV furosemide, spironolactone PO and albumin infusion  -> hold diuretics today with confusion  Pt with appearance of venous stasis with signif blistering/weeping  Consulted wound care  Will likely need ABIs

## 2018-06-25 NOTE — H&P
Inpatient Radiology Pre-procedure Note    History of Present Illness:  Alon Adamson is a 63 y.o. male who presents for paracentesis. Patient is ETOH cirrhosis and has significant associated complications. Presents today for paracentesis.       Admission H&P reviewed.  Past Medical History:   Diagnosis Date    Anticoagulant long-term use     Arthritis     Back pain     Cellulitis     Cirrhosis     Coronary artery disease     DM (diabetes mellitus)     Hearing loss     right ear    Hepatic encephalopathy     Hypertension     diagnosed today (11/25/2015) and prescribed toprol    Leg edema     Nephrolithiasis     JACK (obstructive sleep apnea)     Seizures     Splenomegaly      Past Surgical History:   Procedure Laterality Date    APPENDECTOMY      Open    BACK SURGERY      CHOLECYSTECTOMY      laprascopic    COLONOSCOPY N/A 1/25/2018    Procedure: COLONOSCOPY;  Surgeon: Lashawn Myles MD;  Location: [facility] ([address] [place] [address]);  Service: Endoscopy;  Laterality: N/A;    LUMBAR DISCECTOMY      SPLENIC ARTERY EMBOLIZATION  04/08/2016    Dr Taveras    TONSILLECTOMY         Review of Systems:   As documented in primary team H&P    Home Meds:   Prior to Admission medications    Medication Sig Start Date End Date Taking? Authorizing Provider   ascorbic acid (VITAMIN C) 1000 MG tablet Take 1,000 mg by mouth once daily.    Historical Provider, MD   aspirin (ECOTRIN) 81 MG EC tablet Take 1 tablet (81 mg total) by mouth once daily. 1/27/18 1/27/19  Abdi Lowry MD   atorvastatin (LIPITOR) 40 MG tablet TAKE 1 TABLET(40 MG) BY MOUTH EVERY DAY 12/31/17   Elan Marin MD   docusate sodium (COLACE) 100 MG capsule Take 100 mg by mouth once daily.     Historical Provider, MD   ferrous gluconate 270 mg (27 mg iron) Tab Take 1 tablet by mouth once daily.    Historical Provider, MD   fish oil-omega-3 fatty acids 300-1,000 mg capsule Take 2 capsules (2 g total) by mouth once daily.  Patient taking differently:  Take 2 g by mouth 2 (two) times daily.  11/25/15   Deya Cohen MD   furosemide (LASIX) 80 MG tablet Take 1 tablet (80 mg total) by mouth 2 (two) times daily. 3/23/18   Miriam García MD   GENERLAC 10 gram/15 mL solution Take 60 mLs (40 g total) by mouth 3 (three) times daily. 60mg three times per day  Patient taking differently: Take 60 mLs by mouth 3 (three) times daily.  12/15/17   Miriam García MD   levETIRAcetam (KEPPRA) 500 MG Tab Take 1 tablet (500 mg total) by mouth 2 (two) times daily. 12/15/17   Miriam García MD   magnesium oxide (MAG-OX) 400 mg tablet Take 1 tablet (400 mg total) by mouth once daily. 9/16/16   Miriam García MD   ondansetron (ZOFRAN) 4 MG tablet Take 1 tablet (4 mg total) by mouth every 8 (eight) hours as needed for Nausea. 12/15/17   Miriam García MD   pantoprazole (PROTONIX) 40 MG tablet Take 1 tablet (40 mg total) by mouth 2 (two) times daily before meals. 1/27/18 1/27/19  Abdi Lowry MD   rifAXIMin (XIFAXAN) 550 mg Tab Take 1 tablet (550 mg total) by mouth 2 (two) times daily. 12/15/17   Miriam García MD   sodium bicarbonate 650 MG tablet Take 1 tablet (650 mg total) by mouth 2 (two) times daily. 6/16/18 6/16/19  Regulo Gómez MD   spironolactone (ALDACTONE) 100 MG tablet Take 2 tablets (200 mg total) by mouth 2 (two) times daily. 3/23/18   Miriam García MD   tramadol (ULTRAM) 50 mg tablet Take 50 mg by mouth every 6 (six) hours as needed for Pain.    Historical Provider, MD     Scheduled Meds:    albumin human 25%  25 g Intravenous BID    cefTRIAXone (ROCEPHIN) IVPB  1 g Intravenous Q24H    famotidine  20 mg Oral BID    heparin (porcine)  5,000 Units Subcutaneous Q8H    lactulose  20 g Oral Q6H    levETIRAcetam  500 mg Oral BID    magnesium oxide  400 mg Oral Daily    rifAXImin  550 mg Oral BID    vancomycin (VANCOCIN) IVPB  1,500 mg Intravenous Q12H     Continuous Infusions:   PRN Meds:acetaminophen, diphenhydrAMINE-zinc acetate 1-0.1%, hydrOXYzine HCl, ondansetron, sodium  chloride 0.9%  Anticoagulants/Antiplatelets: no anticoagulation    Allergies:   Review of patient's allergies indicates:   Allergen Reactions    Nsaids (non-steroidal anti-inflammatory drug)      D/t to liver disease.    Tylenol [acetaminophen]      D/t liver disease.    Penicillins Other (See Comments)     Tolerated pip-tazo in 8/2016 without issue  Since childhood was told not to take it     Sedation Hx: have not been any systemic reactions    Labs:    Recent Labs  Lab 06/25/18  0658   INR 1.9*       Recent Labs  Lab 06/25/18  0658   WBC 4.54   HGB 8.7*   HCT 25.1*   MCV 97   PLT 56*      Recent Labs  Lab 06/25/18  0658   *      K 4.1   *   CO2 18*   BUN 22   CREATININE 1.9*   CALCIUM 9.4   MG 1.7   ALT 23   AST 27   ALBUMIN 3.0*   BILITOT 3.0*         Vitals:  Temp: 98 °F (36.7 °C) (06/25/18 1147)  Pulse: 107 (06/25/18 1452)  Resp: 16 (06/25/18 1452)  BP: (!) 145/65 (06/25/18 1452)  SpO2: 100 % (06/25/18 1452)     Physical Exam:  ASA: 3  Mallampati: 2    General: no acute distress  Mental Status: alert and oriented to person, place and time  HEENT: normocephalic, atraumatic  Chest: unlabored breathing  Abdomen: nondistended  Extremity: moves all extremities    Plan: Perform paracentesis.   Sedation Plan: Local.     Mckinley Thapa MD  PGY - 2  Department of Radiology

## 2018-06-25 NOTE — PROGRESS NOTES
[facility]  Liver Transplant  Progress Note    Patient Name: Alon Adamson  MRN: [id number]  Admission Date: 6/22/2018  Hospital Length of Stay: 3 days  Code Status: Full Code  Primary Care Provider: Mckinley Vides MD    Subjective:     History of Present Illness:  Mr. 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  Complications of liver disease include hyperbilirubinemia, hypoalbuminemia, severe malnutrition,   hepatic encephalopathy, thrombocytopenia, splenomegaly, ascites, hypervolemia, coagulopathy, portal HTN.  Pt recently admitted for severe hepatic encephalopathy requiring intubation but has been stable from mentation standpoint post resolution of acute enceophalopathy.      Mr. Adamson was seen in clinic6/22 and was noted to have significant hypervolemia, weeping from BLE, and VICKY on CKD3 on routine labs.  His MELD increased from 22 to 26 per labs- coordinator notified and MELD updated.  Cr elevated to 2.3 from baseline of 1.4.  Also with worsening ascites.  Pt reported increased pruritis, generalized fatigue and weakness.  He presented in wheelchair.  ROS otherwise negative.        Hospital Course:  6/23/18: Admitted on 6/22 with VICKY, anasarca, blistering edema in LE. Given 60 mg furosemide IV and albumin infusion. UOP:1300ml. Net: neg 940 ml. Cr: improving 2.1 (from 2.3, his baseline was 1.5). Patient c/o leg edema, itching.   6/24/18: No acute events, has not had BM and more shaky, was sitting up and having lunch during rounds, AAO x 3. UOP overnight 1.5 L - diuresing well, leg edema/anasarca improving but still 2-3+ edema, left LE some blistering but not weeping. Cr improving 2 (from 2.4), continue diuresis/albumin, increase lactulose until he has a BM.  6/25: Pt with G3 encephalopathy this AM; unable to state name, lethargic, opens eyes to voice but not conversive.  Diuretics held, blood/urine cx sent, dx para ordered, empiric Rocephin started.  Head CT  ordered as pt with acute change and negative.  Pt also with concern for BLE cellulitis- upper legs bright red with lower BLE still with dark appearance of venous stasis.  Cr improved from 2/0 -> 1.9.      Scheduled Meds:   albumin human 25%  25 g Intravenous BID    cefTRIAXone (ROCEPHIN) IVPB  1 g Intravenous Q24H    famotidine  20 mg Oral BID    heparin (porcine)  5,000 Units Subcutaneous Q8H    lactulose  20 g Oral Q6H    levETIRAcetam  500 mg Oral BID    magnesium oxide  400 mg Oral Daily    rifAXImin  550 mg Oral BID    vancomycin (VANCOCIN) IVPB  1,500 mg Intravenous Q12H     Continuous Infusions:  PRN Meds:acetaminophen, diphenhydrAMINE-zinc acetate 1-0.1%, hydrOXYzine HCl, ondansetron, sodium chloride 0.9%    Review of Systems   Constitutional: Positive for fatigue and unexpected weight change. Negative for activity change, appetite change, chills, diaphoresis and fever.   HENT: Negative for congestion, sinus pressure, sneezing and sore throat.    Eyes: Negative for pain, redness and visual disturbance.   Respiratory: Negative for cough, shortness of breath and wheezing.    Cardiovascular: Positive for leg swelling. Negative for chest pain and palpitations.   Gastrointestinal: Positive for abdominal distention. Negative for abdominal pain, anal bleeding, blood in stool, diarrhea, nausea and vomiting.   Endocrine: Negative for cold intolerance, heat intolerance and polydipsia.   Genitourinary: Negative for dysuria, scrotal swelling and testicular pain.   Musculoskeletal: Negative for arthralgias, back pain, myalgias and neck pain.   Skin: Negative for pallor and rash.   Allergic/Immunologic: Negative for food allergies and immunocompromised state.   Neurological: Negative for dizziness, syncope, speech difficulty and weakness.   Hematological: Bruises/bleeds easily.   Psychiatric/Behavioral: Positive for confusion, decreased concentration and sleep disturbance. Negative for agitation.     Objective:      Vital Signs (Most Recent):  Temp: 98 °F (36.7 °C) (06/25/18 1147)  Pulse: 107 (06/25/18 1452)  Resp: 16 (06/25/18 1452)  BP: (!) 145/65 (06/25/18 1452)  SpO2: 100 % (06/25/18 1452) Vital Signs (24h Range):  Temp:  [97.7 °F (36.5 °C)-98.6 °F (37 °C)] 98 °F (36.7 °C)  Pulse:  [] 107  Resp:  [16] 16  SpO2:  [95 %-100 %] 100 %  BP: (106-145)/(53-70) 145/65     Weight: (!) 139 kg (306 lb 7 oz)  Body mass index is 39.34 kg/m².    Intake/Output - Last 3 Shifts       06/23 0700 - 06/24 0659 06/24 0700 - 06/25 0659 06/25 0700 - 06/26 0659    P.O. 525 1620     I.V. (mL/kg) 10 (0.1) 10 (0.1)     Other 0 0     Total Intake(mL/kg) 535 (3.8) 1630 (11.7)     Urine (mL/kg/hr) 1520 (0.5) 840 (0.3)     Emesis/NG output 0 (0) 0 (0) 100 (0.1)    Other 0 (0) 0 (0)     Stool 0 (0) 0 (0)     Blood 0 (0) 0 (0)     Total Output 1520 840 100    Net -985 +790 -100           Urine Occurrence 0 x 1 x     Stool Occurrence 0 x 3 x     Emesis Occurrence 0 x 0 x           Physical Exam   Constitutional: He is oriented to person, place, and time. He appears well-developed. No distress.   Chronically ill-appearing. Malnourished. Temporal wasting.     HENT:   Head: Normocephalic and atraumatic.   Mouth/Throat: Oropharynx is clear and moist.   Eyes: Conjunctivae are normal. Scleral icterus is present.   Neck: Normal range of motion.   Cardiovascular: Normal rate, regular rhythm, normal heart sounds and intact distal pulses.    No murmur heard.  Pulmonary/Chest: Effort normal and breath sounds normal. No respiratory distress. He has no wheezes. He has no rales.   Abdominal: Soft. Normal appearance and bowel sounds are normal. He exhibits distension. He exhibits no shifting dullness, no pulsatile liver, no fluid wave and no ascites. There is no splenomegaly or hepatomegaly. There is no tenderness. No hernia.   Small ascites, not tense.   Musculoskeletal: He exhibits edema (3+).   Neurological: He is alert and oriented to person, place, and  time. He is not disoriented.   Asterixis: present.   Skin: Skin is warm and dry. No rash noted. He is not diaphoretic. There is erythema.   Scattered spider angiomata on upper chest. Palmar erythema.  Medial BLE erythema; blistering on distal BLE    Psychiatric: He has a normal mood and affect. His behavior is normal. His mood appears not anxious. His affect is not inappropriate. He is not agitated. He is communicative. He is attentive.   Nursing note and vitals reviewed.      Laboratory:  Immunosuppressants     None        CBC:     Recent Labs  Lab 06/25/18  0658   WBC 4.54   RBC 2.59*   HGB 8.7*   HCT 25.1*   PLT 56*   MCV 97   MCH 33.6*   MCHC 34.7     CMP:     Recent Labs  Lab 06/25/18 0658   *   CALCIUM 9.4   ALBUMIN 3.0*   PROT 5.2*      K 4.1   CO2 18*   *   BUN 22   CREATININE 1.9*   ALKPHOS 66   ALT 23   AST 27   BILITOT 3.0*     Labs within the past 24 hours have been reviewed.    Diagnostic Results:  I have personally reviewed all pertinent imaging studies.    Assessment/Plan:     * Acute kidney injury superimposed on chronic kidney disease    - VCIKY improving with albumin infusion  - Ur Na: 32, unlikely for HRS  - continue diuresis and monitor          Cellulitis of both lower extremities    - Start vanc  - Monitor          Physical deconditioning    - PT consulted          Ascites due to alcoholic cirrhosis    See BLE edema          Coagulopathy    Secondary to decompensated liver disease          Cholestatic pruritus    - Atarax ordered          Hypoalbuminemia due to protein-calorie malnutrition    - Dietary consulted          Decompensated hepatic cirrhosis      MELD-Na score: 24 at 6/25/2018  6:58 AM  MELD score: 24 at 6/25/2018  6:58 AM  Calculated from:  Serum Creatinine: 1.9 mg/dL at 6/25/2018  6:58 AM  Serum Sodium: 140 mmol/L (Rounded to 137) at 6/25/2018  6:58 AM  Total Bilirubin: 3 mg/dL at 6/25/2018  6:58 AM  INR(ratio): 1.9 at 6/25/2018  6:58 AM  Age: 63 years             Thrombocytopenia    Secondary to splenomegaly from portal HTN- should improve with txp  No s/s overt bleed          Bilateral edema of lower extremity    IV furosemide, spironolactone PO and albumin infusion  -> hold diuretics today with confusion  Pt with appearance of venous stasis with signif blistering/weeping  Consulted wound care  Will likely need ABIs          Hepatic encephalopathy    Acute on chronic- grade 3  - lactulose enema ordered + additional PO dose once ok to swallow, continue rifaximin 550 mg BID  - infx workup initiated: blood/urine/ascites  - Start empiric abx: cefepime/vanc              VTE Risk Mitigation         Ordered     heparin (porcine) injection 5,000 Units  Every 8 hours      06/22/18 1502     IP VTE HIGH RISK PATIENT  Once      06/22/18 1502          The patients clinical status was discussed at multidisplinary rounds, involving transplant surgery, transplant medicine, pharmacy, nursing, nutrition, and social work    Discharge Planning:  Not a candidate for discharge.    Bebeto Virgen PA-C  Liver Transplant  [facility]

## 2018-06-26 ENCOUNTER — CONFERENCE (OUTPATIENT)
Dept: TRANSPLANT | Facility: CLINIC | Age: 63
End: 2018-06-26

## 2018-06-26 ENCOUNTER — TELEPHONE (OUTPATIENT)
Dept: TRANSPLANT | Facility: CLINIC | Age: 63
End: 2018-06-26

## 2018-06-26 LAB
ALBUMIN SERPL BCP-MCNC: 2.8 G/DL
ALBUMIN SERPL BCP-MCNC: 2.9 G/DL
ALP SERPL-CCNC: 58 U/L
ALT SERPL W/O P-5'-P-CCNC: 24 U/L
ANION GAP SERPL CALC-SCNC: 10 MMOL/L
ANION GAP SERPL CALC-SCNC: 8 MMOL/L
AST SERPL-CCNC: 26 U/L
BASOPHILS # BLD AUTO: 0.04 K/UL
BASOPHILS NFR BLD: 1 %
BILIRUB SERPL-MCNC: 3.4 MG/DL
BUN SERPL-MCNC: 25 MG/DL
BUN SERPL-MCNC: 25 MG/DL
CALCIUM SERPL-MCNC: 9.6 MG/DL
CALCIUM SERPL-MCNC: 9.6 MG/DL
CHLORIDE SERPL-SCNC: 116 MMOL/L
CHLORIDE SERPL-SCNC: 119 MMOL/L
CO2 SERPL-SCNC: 20 MMOL/L
CO2 SERPL-SCNC: 20 MMOL/L
CREAT SERPL-MCNC: 2.1 MG/DL
CREAT SERPL-MCNC: 2.1 MG/DL
DIFFERENTIAL METHOD: ABNORMAL
EOSINOPHIL # BLD AUTO: 0.2 K/UL
EOSINOPHIL NFR BLD: 4.1 %
ERYTHROCYTE [DISTWIDTH] IN BLOOD BY AUTOMATED COUNT: 18 %
EST. GFR  (AFRICAN AMERICAN): 37.9 ML/MIN/1.73 M^2
EST. GFR  (AFRICAN AMERICAN): 37.9 ML/MIN/1.73 M^2
EST. GFR  (NON AFRICAN AMERICAN): 32.7 ML/MIN/1.73 M^2
EST. GFR  (NON AFRICAN AMERICAN): 32.7 ML/MIN/1.73 M^2
GLUCOSE SERPL-MCNC: 119 MG/DL
GLUCOSE SERPL-MCNC: 126 MG/DL
HCT VFR BLD AUTO: 23.9 %
HGB BLD-MCNC: 8.3 G/DL
IMM GRANULOCYTES # BLD AUTO: 0.01 K/UL
IMM GRANULOCYTES NFR BLD AUTO: 0.2 %
INR PPP: 2
LYMPHOCYTES # BLD AUTO: 0.7 K/UL
LYMPHOCYTES NFR BLD: 18 %
MAGNESIUM SERPL-MCNC: 2.2 MG/DL
MCH RBC QN AUTO: 33.9 PG
MCHC RBC AUTO-ENTMCNC: 34.7 G/DL
MCV RBC AUTO: 98 FL
MONOCYTES # BLD AUTO: 0.7 K/UL
MONOCYTES NFR BLD: 16.8 %
NEUTROPHILS # BLD AUTO: 2.5 K/UL
NEUTROPHILS NFR BLD: 59.9 %
NRBC BLD-RTO: 0 /100 WBC
PATH INTERP FLD-IMP: NORMAL
PHOSPHATE SERPL-MCNC: 3.7 MG/DL
PLATELET # BLD AUTO: 56 K/UL
PMV BLD AUTO: 12 FL
POTASSIUM SERPL-SCNC: 4.3 MMOL/L
POTASSIUM SERPL-SCNC: 4.4 MMOL/L
PROT SERPL-MCNC: 5.2 G/DL
PROTHROMBIN TIME: 19.3 SEC
RBC # BLD AUTO: 2.45 M/UL
SODIUM SERPL-SCNC: 146 MMOL/L
SODIUM SERPL-SCNC: 147 MMOL/L
VANCOMYCIN SERPL-MCNC: 14.4 UG/ML
WBC # BLD AUTO: 4.11 K/UL
ZINC SERPL-MCNC: 55 UG/DL (ref 60–130)

## 2018-06-26 PROCEDURE — 85025 COMPLETE CBC W/AUTO DIFF WBC: CPT | Mod: NTX

## 2018-06-26 PROCEDURE — 25000003 PHARM REV CODE 250: Mod: NTX | Performed by: NURSE PRACTITIONER

## 2018-06-26 PROCEDURE — 20600001 HC STEP DOWN PRIVATE ROOM: Mod: NTX

## 2018-06-26 PROCEDURE — 25000003 PHARM REV CODE 250: Mod: NTX | Performed by: TRANSPLANT SURGERY

## 2018-06-26 PROCEDURE — 95816 EEG AWAKE AND DROWSY: CPT | Mod: NTX

## 2018-06-26 PROCEDURE — S0028 INJECTION, FAMOTIDINE, 20 MG: HCPCS | Mod: NTX | Performed by: PHYSICIAN ASSISTANT

## 2018-06-26 PROCEDURE — 80202 ASSAY OF VANCOMYCIN: CPT | Mod: NTX

## 2018-06-26 PROCEDURE — 85610 PROTHROMBIN TIME: CPT | Mod: NTX

## 2018-06-26 PROCEDURE — 36415 COLL VENOUS BLD VENIPUNCTURE: CPT | Mod: NTX

## 2018-06-26 PROCEDURE — 80069 RENAL FUNCTION PANEL: CPT | Mod: NTX

## 2018-06-26 PROCEDURE — 95816 EEG AWAKE AND DROWSY: CPT | Mod: 26,NTX,, | Performed by: PSYCHIATRY & NEUROLOGY

## 2018-06-26 PROCEDURE — S5010 5% DEXTROSE AND 0.45% SALINE: HCPCS | Mod: NTX | Performed by: PHYSICIAN ASSISTANT

## 2018-06-26 PROCEDURE — 83735 ASSAY OF MAGNESIUM: CPT | Mod: NTX

## 2018-06-26 PROCEDURE — 99233 SBSQ HOSP IP/OBS HIGH 50: CPT | Mod: NTX,,, | Performed by: PHYSICIAN ASSISTANT

## 2018-06-26 PROCEDURE — 80053 COMPREHEN METABOLIC PANEL: CPT | Mod: NTX

## 2018-06-26 PROCEDURE — 25000003 PHARM REV CODE 250: Mod: NTX | Performed by: PHYSICIAN ASSISTANT

## 2018-06-26 PROCEDURE — 63600175 PHARM REV CODE 636 W HCPCS: Mod: NTX | Performed by: PHYSICIAN ASSISTANT

## 2018-06-26 PROCEDURE — 63600175 PHARM REV CODE 636 W HCPCS: Mod: NTX | Performed by: TRANSPLANT SURGERY

## 2018-06-26 RX ORDER — LACTULOSE 10 G/15ML
200 SOLUTION ORAL; RECTAL ONCE
Status: COMPLETED | OUTPATIENT
Start: 2018-06-26 | End: 2018-06-26

## 2018-06-26 RX ORDER — FLUCONAZOLE 2 MG/ML
200 INJECTION, SOLUTION INTRAVENOUS
Status: DISCONTINUED | OUTPATIENT
Start: 2018-06-26 | End: 2018-06-27

## 2018-06-26 RX ORDER — LEVETIRACETAM 5 MG/ML
500 INJECTION INTRAVASCULAR EVERY 12 HOURS
Status: DISCONTINUED | OUTPATIENT
Start: 2018-06-26 | End: 2018-06-27

## 2018-06-26 RX ORDER — DEXTROSE MONOHYDRATE AND SODIUM CHLORIDE 5; .45 G/100ML; G/100ML
INJECTION, SOLUTION INTRAVENOUS CONTINUOUS
Status: ACTIVE | OUTPATIENT
Start: 2018-06-26 | End: 2018-06-26

## 2018-06-26 RX ORDER — FLUCONAZOLE 200 MG/1
200 TABLET ORAL DAILY
Status: DISCONTINUED | OUTPATIENT
Start: 2018-06-26 | End: 2018-06-26

## 2018-06-26 RX ORDER — FAMOTIDINE 10 MG/ML
20 INJECTION INTRAVENOUS 2 TIMES DAILY
Status: DISCONTINUED | OUTPATIENT
Start: 2018-06-26 | End: 2018-06-27

## 2018-06-26 RX ADMIN — LACTULOSE 30 G: 20 SOLUTION ORAL at 05:06

## 2018-06-26 RX ADMIN — RIFAXIMIN 550 MG: 550 TABLET ORAL at 09:06

## 2018-06-26 RX ADMIN — LEVETIRACETAM 500 MG: 5 INJECTION INTRAVENOUS at 02:06

## 2018-06-26 RX ADMIN — LACTULOSE 30 G: 20 SOLUTION ORAL at 09:06

## 2018-06-26 RX ADMIN — LEVETIRACETAM 500 MG: 5 INJECTION INTRAVENOUS at 10:06

## 2018-06-26 RX ADMIN — VANCOMYCIN HYDROCHLORIDE 1500 MG: 1 INJECTION, POWDER, LYOPHILIZED, FOR SOLUTION INTRAVENOUS at 01:06

## 2018-06-26 RX ADMIN — LACTULOSE 200 G: 10 SOLUTION ORAL at 09:06

## 2018-06-26 RX ADMIN — CEFTRIAXONE SODIUM 1 G: 1 INJECTION, POWDER, FOR SOLUTION INTRAMUSCULAR; INTRAVENOUS at 02:06

## 2018-06-26 RX ADMIN — FAMOTIDINE 20 MG: 10 INJECTION INTRAVENOUS at 09:06

## 2018-06-26 RX ADMIN — VANCOMYCIN HYDROCHLORIDE 1500 MG: 1 INJECTION, POWDER, LYOPHILIZED, FOR SOLUTION INTRAVENOUS at 02:06

## 2018-06-26 RX ADMIN — DEXTROSE AND SODIUM CHLORIDE: 5; .45 INJECTION, SOLUTION INTRAVENOUS at 02:06

## 2018-06-26 RX ADMIN — FLUCONAZOLE 200 MG: 2 INJECTION INTRAVENOUS at 04:06

## 2018-06-26 NOTE — TELEPHONE ENCOUNTER
PATIENT NAME: Alon Muller [facility] #: [id number]    Lab Results   Component Value Date    CREATININE 2.1 (H) 06/26/2018     (H) 06/26/2018    BILITOT 3.4 (H) 06/26/2018    ALBUMIN 2.9 (L) 06/26/2018    INR 2.0 (H) 06/26/2018     MELD 26  Encephalopathy: 1 - 2  Ascites: moderate  Dialysis: no     Recertification requestor: Yady Dietz

## 2018-06-26 NOTE — ASSESSMENT & PLAN NOTE
Acute on chronic- grade 3  - lactulose enema ordered + additional PO dose once ok to swallow, continue rifaximin 550 mg BID  - infx workup initiated: blood/urine/ascites  - Continue abx: cefepime/vanc --> pt with G+C in blood

## 2018-06-26 NOTE — PROCEDURES
DATE OF SERVICE:  06/26/2018    EEG NUMBER:  [id number]    REFERRING PHYSICIAN:  Alma Joyce M.D.    This EEG was performed to assess for subclinical seizures.    ELECTROENCEPHALOGRAM REPORT    METHODOLOGY:  Electroencephalographic (EEG) recording is recorded with   electrodes placed according to the International 10-20 placement system.  Thirty   two (32) channels of digital signal (sampling rate of 512/sec), including T1   and T2, were simultaneously recorded from the scalp and may include EKG, EMG,   and/or eye monitors.  Recording band pass was 0.1 to 512 Hz.  Digital video   recording of the patient is simultaneously recorded with the EEG.  The patient   is instructed to report clinical symptoms which may occur during the recording   session.  EEG and video recording are stored and archived in digital format.    Activation procedures, which include photic stimulation, hyperventilation and   instructing patients to perform simple tasks, are done in selected patients.    The EEG is displayed on a monitor screen and can be reviewed using different   montages.  Computer assisted-analysis is employed to detect spike and   electrographic seizure activity.  The entire record is submitted for computer   analysis.  The entire recording is visually reviewed, and the times identified   by computer analysis as being spikes or seizures are reviewed again.    Compressed spectral analysis (CSA) is also performed on the activity recorded   from each individual channel.  This is displayed as a power display of   frequencies from 0 to 30 Hz over time.  The CSA is reviewed looking for   asymmetries in power between homologous areas of the scalp, then compared with   the original EEG recording.    E-Generator software was also utilized in the review of this study.  This software   suite analyzes the EEG recording in multiple domains.  Coherence and rhythmicity   are computed to identify EEG sections which may contain organized  seizures.    Each channel undergoes analysis to detect the presence of spike and sharp waves   which have special and morphological characteristics of epileptic activity.  The   routine EEG recording is converted from special into frequency domain.  This is   then displayed comparing homologous areas to identify areas of significant   asymmetry.  Algorithm to identify non-cortically generated artifact is used to   separate artifact from the EEG.    EEG FINDINGS:  The recording was obtained with a number of standard bipolar and   referential montages during wakefulness and drowsiness.  In the alert state, the   background was diffusely disorganized and comprised an admixture of theta range   frequencies with a nonsustained posterior dominant rhythm of 5 to 6 Hz, which   was symmetric.  The background was punctuated by triphasic morphology waves.  In   addition, artifact associated with tremors was noted.  During drowsiness, the   background rhythm waxed and waned and there were periods of slowing.  No sleep   was recorded.  There were no interictal epileptiform abnormalities and no   clinical or electrographic seizure recorded.    The EKG channel revealed sinus rhythm.    IMPRESSION:  This is an abnormal EEG during wakefulness and drowsiness.  Diffuse   disorganized slowing of the background was noted.  Frequent triphasic waves   were noted.    CLINICAL CORRELATION:  The patient is a 63-year-old male with a history of   hepatic encephalopathy who is currently maintained on levetiracetam.  This is an   abnormal EEG during wakefulness and drowsiness.  The overall degree of slowing   and disorganization along with frequent triphasic waves is suggestive of a moderate   degree of encephalopathy, likely a metabolic encephalopathy secondary to liver   dysfunction.  There is no evidence of an epileptic process on this recording.    No seizures were recorded during this study.      FAK/IN  dd: 06/26/2018 17:47:10 (CDT)  td:  06/26/2018 18:19:35 (CDT)  Doc ID   #[id number]  Job ID #[id number]    CC:

## 2018-06-26 NOTE — ASSESSMENT & PLAN NOTE
IV furosemide, spironolactone PO and albumin infusion  -> hold diuretics again today with confusion  Pt with appearance of venous stasis with signif blistering/weeping  Consulted wound care  ABIs satisfactory

## 2018-06-26 NOTE — PLAN OF CARE
Problem: Patient Care Overview  Goal: Plan of Care Review  Outcome: Ongoing (interventions implemented as appropriate)  Pt is encephalopathic, VSS, and total assist. 2 lactulose enemas administered with large bowl movements-pt becoming more alert. ECG obtained. Vanc and Rocephin administered. Pt had measured u/o of 325 ml clear yellow urine. Skin barrier cream applied to buttock and lotion applied to legs. Pt's family at bedside. Pt's bed in lowest position, call bell within reach, bed alarm set,  and [employer] camera in room.

## 2018-06-26 NOTE — SUBJECTIVE & OBJECTIVE
Scheduled Meds:   cefTRIAXone (ROCEPHIN) IVPB  1 g Intravenous Q24H    famotidine (PF)  20 mg Intravenous BID    fluconazole (DIFLUCAN) IVPB  200 mg Intravenous Q24H    heparin (porcine)  5,000 Units Subcutaneous Q8H    lactulose  30 g Oral Q6H    levetiracetam IVPB  500 mg Intravenous Q12H    rifAXImin  550 mg Oral BID    vancomycin (VANCOCIN) IVPB  1,500 mg Intravenous Q12H     Continuous Infusions:   dextrose 5 % and 0.45 % NaCl 75 mL/hr at 06/26/18 1438     PRN Meds:acetaminophen, diphenhydrAMINE-zinc acetate 1-0.1%, lactulose, ondansetron, sodium chloride 0.9%    Review of Systems   Constitutional: Positive for fatigue and unexpected weight change. Negative for activity change, appetite change, chills, diaphoresis and fever.   HENT: Negative for congestion, sinus pressure, sneezing and sore throat.    Eyes: Negative for pain, redness and visual disturbance.   Respiratory: Negative for cough, shortness of breath and wheezing.    Cardiovascular: Positive for leg swelling. Negative for chest pain and palpitations.   Gastrointestinal: Positive for abdominal distention. Negative for abdominal pain, anal bleeding, blood in stool, diarrhea, nausea and vomiting.   Endocrine: Negative for cold intolerance, heat intolerance and polydipsia.   Genitourinary: Negative for dysuria, scrotal swelling and testicular pain.   Musculoskeletal: Negative for arthralgias, back pain, myalgias and neck pain.   Skin: Negative for pallor and rash.   Allergic/Immunologic: Negative for food allergies and immunocompromised state.   Neurological: Negative for dizziness, syncope, speech difficulty and weakness.   Hematological: Bruises/bleeds easily.   Psychiatric/Behavioral: Positive for confusion, decreased concentration and sleep disturbance. Negative for agitation.     Objective:     Vital Signs (Most Recent):  Temp: 98.3 °F (36.8 °C) (06/26/18 1150)  Pulse: 95 (06/26/18 1150)  Resp: 17 (06/26/18 1150)  BP: 130/73 (06/26/18  1150)  SpO2: 99 % (06/26/18 1150) Vital Signs (24h Range):  Temp:  [97.2 °F (36.2 °C)-98.9 °F (37.2 °C)] 98.3 °F (36.8 °C)  Pulse:  [] 95  Resp:  [17-20] 17  SpO2:  [96 %-99 %] 99 %  BP: (109-139)/(58-75) 130/73     Weight: (!) 139 kg (306 lb 7 oz)  Body mass index is 39.34 kg/m².    Intake/Output - Last 3 Shifts       06/24 0700 - 06/25 0659 06/25 0700 - 06/26 0659 06/26 0700 - 06/27 0659    P.O. 1620 0     I.V. (mL/kg) 10 (0.1)      Other 0      Total Intake(mL/kg) 1630 (11.7) 0 (0)     Urine (mL/kg/hr) 840 (0.3) 0 (0)     Emesis/NG output 0 (0) 100 (0)     Other 0 (0) 1000 (0.3)     Stool 0 (0) 0 (0)     Blood 0 (0) 0 (0)     Total Output 840 1100      Net +790 -1100             Urine Occurrence 2 x 0 x     Stool Occurrence 4 x 2 x     Emesis Occurrence 0 x 0 x           Physical Exam   Constitutional: He appears well-developed. No distress.   Chronically ill-appearing. Malnourished. Temporal wasting.     HENT:   Head: Normocephalic and atraumatic.   Mouth/Throat: Oropharynx is clear and moist.   Eyes: Conjunctivae are normal. Scleral icterus is present.   Neck: Normal range of motion.   Cardiovascular: Normal rate, regular rhythm, normal heart sounds and intact distal pulses.    No murmur heard.  Pulmonary/Chest: Effort normal and breath sounds normal. No respiratory distress. He has no wheezes. He has no rales.   Abdominal: Soft. Normal appearance and bowel sounds are normal. He exhibits distension. He exhibits no shifting dullness, no pulsatile liver, no fluid wave and no ascites. There is no splenomegaly or hepatomegaly. There is no tenderness. No hernia.   Small ascites, not tense.   Musculoskeletal: He exhibits edema (3+).   Neurological: He is not disoriented.   Responding only to verbal and tactile stimuli   Skin: Skin is warm and dry. No rash noted. He is not diaphoretic. There is erythema.   Scattered spider angiomata on upper chest. Palmar erythema.  Medial BLE erythema; blistering on distal BLE     Psychiatric: His mood appears not anxious. His affect is not inappropriate. He is not agitated. He is communicative. He is attentive.   Nursing note and vitals reviewed.    Laboratory:  Immunosuppressants     None        CBC:     Recent Labs  Lab 06/26/18 0428   WBC 4.11   RBC 2.45*   HGB 8.3*   HCT 23.9*   PLT 56*   MCV 98   MCH 33.9*   MCHC 34.7     CMP:     Recent Labs  Lab 06/26/18 0428 06/26/18  1437   * 119*   CALCIUM 9.6 9.6   ALBUMIN 2.9* 2.8*   PROT 5.2*  --    * 147*   K 4.4 4.3   CO2 20* 20*   * 119*   BUN 25* 25*   CREATININE 2.1* 2.1*   ALKPHOS 58  --    ALT 24  --    AST 26  --    BILITOT 3.4*  --      Labs within the past 24 hours have been reviewed.    Diagnostic Results:  I have personally reviewed all pertinent imaging studies.

## 2018-06-26 NOTE — TELEPHONE ENCOUNTER
Patient discussed in Liver Transplant Discussion Committee.  Plan- patient will be Status 7 on the liver transplant list due to the following reasons: malnourished, deconditioned, complex surgical history, bacteremic and insurance issues.

## 2018-06-26 NOTE — PLAN OF CARE
Problem: Patient Care Overview  Goal: Plan of Care Review  Outcome: Ongoing (interventions implemented as appropriate)  Pt remains confused.  Pt administered 1800 and 0000 dose on this shift resulting in 1 bowel movement so far.  Lactulose increased.  Family stating pt was receiving more than what was being given at home.  PIV is intact and free of infection.  Incontinent of urine and stool.  Family is at the bedside.  Bed alarm is on for patient safety.  [employer] camera is in use.  Bed is in lowest position, call bell is in reach, and pt instructed to call nurse when needing assistance.  Will continue to monitor.

## 2018-06-26 NOTE — ASSESSMENT & PLAN NOTE
- VICKY initially improving with albumin infusion but with minimal intake 6/25  - Ur Na: 32, unlikely for HRS

## 2018-06-26 NOTE — PROGRESS NOTES
[facility]  Liver Transplant  Progress Note    Patient Name: Alon Adamson  MRN: [id number]  Admission Date: 6/22/2018  Hospital Length of Stay: 4 days  Code Status: Full Code  Primary Care Provider: Mckinley Vides MD    Subjective:     History of Present Illness:  Mr. Adamson is a 64 yo M with PMH ESLD secondary to ETOH cirrhosis, CKD3, CAD.  Complications of liver disease include hyperbilirubinemia, hypoalbuminemia, severe malnutrition,   hepatic encephalopathy, thrombocytopenia, splenomegaly, ascites, hypervolemia, coagulopathy, portal HTN.  Pt recently admitted for severe hepatic encephalopathy requiring intubation but has been stable from mentation standpoint post resolution of acute enceophalopathy.      Mr. Adamson was seen in clinic6/22 and was noted to have significant hypervolemia, weeping from BLE, and VICKY on CKD3 on routine labs.  His MELD increased from 22 to 26 per labs- coordinator notified and MELD updated.  Cr elevated to 2.3 from baseline of 1.4.  Also with worsening ascites.  Pt reported increased pruritis, generalized fatigue and weakness.  He presented in wheelchair.  ROS otherwise negative.        Hospital Course:  6/23/18: Admitted on 6/22 with VICKY, anasarca, blistering edema in LE. Given 60 mg furosemide IV and albumin infusion. UOP:1300ml. Net: neg 940 ml. Cr: improving 2.1 (from 2.3, his baseline was 1.5). Patient c/o leg edema, itching.   6/24/18: No acute events, has not had BM and more shaky, was sitting up and having lunch during rounds, AAO x 3. UOP overnight 1.5 L - diuresing well, leg edema/anasarca improving but still 2-3+ edema, left LE some blistering but not weeping. Cr improving 2 (from 2.4), continue diuresis/albumin, increase lactulose until he has a BM.  6/25: Pt with G3 encephalopathy this AM; unable to state name, lethargic, opens eyes to voice but not conversive.  Diuretics held, blood/urine cx sent, dx para ordered, empiric Rocephin started.  Head CT  ordered as pt with acute change and negative.  Pt also with concern for BLE cellulitis- upper legs bright red with lower BLE still with dark appearance of venous stasis.  Cr improved from 2/0 -> 1.9.    6/26: Pt still with signif enceph this AM despite improvement yesterday PM.  Lactulose enemas ordered again with improvement throughout the AM.  Blood cx 6/25 with G+C in clusters resembling staph.  Vanc started 6/25- continued today.  EEG today pending as pt with h/o seizure.  If + will consult neuro.  Fluconazole added today secondary to continued encephalopathy.  Proctor placement attempted today for nutrition/hydration but unable to place secondary to agitation.  Hydrate gently today.     Pt placed on hold for infx, insurance issues, and debility- too encephalopathic to inform today but family made aware.    Scheduled Meds:   cefTRIAXone (ROCEPHIN) IVPB  1 g Intravenous Q24H    famotidine (PF)  20 mg Intravenous BID    fluconazole (DIFLUCAN) IVPB  200 mg Intravenous Q24H    heparin (porcine)  5,000 Units Subcutaneous Q8H    lactulose  30 g Oral Q6H    levetiracetam IVPB  500 mg Intravenous Q12H    rifAXImin  550 mg Oral BID    vancomycin (VANCOCIN) IVPB  1,500 mg Intravenous Q12H     Continuous Infusions:   dextrose 5 % and 0.45 % NaCl 75 mL/hr at 06/26/18 1438     PRN Meds:acetaminophen, diphenhydrAMINE-zinc acetate 1-0.1%, lactulose, ondansetron, sodium chloride 0.9%    Review of Systems   Constitutional: Positive for fatigue and unexpected weight change. Negative for activity change, appetite change, chills, diaphoresis and fever.   HENT: Negative for congestion, sinus pressure, sneezing and sore throat.    Eyes: Negative for pain, redness and visual disturbance.   Respiratory: Negative for cough, shortness of breath and wheezing.    Cardiovascular: Positive for leg swelling. Negative for chest pain and palpitations.   Gastrointestinal: Positive for abdominal distention. Negative for abdominal pain, anal  bleeding, blood in stool, diarrhea, nausea and vomiting.   Endocrine: Negative for cold intolerance, heat intolerance and polydipsia.   Genitourinary: Negative for dysuria, scrotal swelling and testicular pain.   Musculoskeletal: Negative for arthralgias, back pain, myalgias and neck pain.   Skin: Negative for pallor and rash.   Allergic/Immunologic: Negative for food allergies and immunocompromised state.   Neurological: Negative for dizziness, syncope, speech difficulty and weakness.   Hematological: Bruises/bleeds easily.   Psychiatric/Behavioral: Positive for confusion, decreased concentration and sleep disturbance. Negative for agitation.     Objective:     Vital Signs (Most Recent):  Temp: 98.3 °F (36.8 °C) (06/26/18 1150)  Pulse: 95 (06/26/18 1150)  Resp: 17 (06/26/18 1150)  BP: 130/73 (06/26/18 1150)  SpO2: 99 % (06/26/18 1150) Vital Signs (24h Range):  Temp:  [97.2 °F (36.2 °C)-98.9 °F (37.2 °C)] 98.3 °F (36.8 °C)  Pulse:  [] 95  Resp:  [17-20] 17  SpO2:  [96 %-99 %] 99 %  BP: (109-139)/(58-75) 130/73     Weight: (!) 139 kg (306 lb 7 oz)  Body mass index is 39.34 kg/m².    Intake/Output - Last 3 Shifts       06/24 0700 - 06/25 0659 06/25 0700 - 06/26 0659 06/26 0700 - 06/27 0659    P.O. 1620 0     I.V. (mL/kg) 10 (0.1)      Other 0      Total Intake(mL/kg) 1630 (11.7) 0 (0)     Urine (mL/kg/hr) 840 (0.3) 0 (0)     Emesis/NG output 0 (0) 100 (0)     Other 0 (0) 1000 (0.3)     Stool 0 (0) 0 (0)     Blood 0 (0) 0 (0)     Total Output 840 1100      Net +790 -1100             Urine Occurrence 2 x 0 x     Stool Occurrence 4 x 2 x     Emesis Occurrence 0 x 0 x           Physical Exam   Constitutional: He appears well-developed. No distress.   Chronically ill-appearing. Malnourished. Temporal wasting.     HENT:   Head: Normocephalic and atraumatic.   Mouth/Throat: Oropharynx is clear and moist.   Eyes: Conjunctivae are normal. Scleral icterus is present.   Neck: Normal range of motion.   Cardiovascular:  Normal rate, regular rhythm, normal heart sounds and intact distal pulses.    No murmur heard.  Pulmonary/Chest: Effort normal and breath sounds normal. No respiratory distress. He has no wheezes. He has no rales.   Abdominal: Soft. Normal appearance and bowel sounds are normal. He exhibits distension. He exhibits no shifting dullness, no pulsatile liver, no fluid wave and no ascites. There is no splenomegaly or hepatomegaly. There is no tenderness. No hernia.   Small ascites, not tense.   Musculoskeletal: He exhibits edema (3+).   Neurological: He is not disoriented.   Responding only to verbal and tactile stimuli   Skin: Skin is warm and dry. No rash noted. He is not diaphoretic. There is erythema.   Scattered spider angiomata on upper chest. Palmar erythema.  Medial BLE erythema; blistering on distal BLE    Psychiatric: His mood appears not anxious. His affect is not inappropriate. He is not agitated. He is communicative. He is attentive.   Nursing note and vitals reviewed.    Laboratory:  Immunosuppressants     None        CBC:     Recent Labs  Lab 06/26/18 0428   WBC 4.11   RBC 2.45*   HGB 8.3*   HCT 23.9*   PLT 56*   MCV 98   MCH 33.9*   MCHC 34.7     CMP:     Recent Labs  Lab 06/26/18  0428 06/26/18  1437   * 119*   CALCIUM 9.6 9.6   ALBUMIN 2.9* 2.8*   PROT 5.2*  --    * 147*   K 4.4 4.3   CO2 20* 20*   * 119*   BUN 25* 25*   CREATININE 2.1* 2.1*   ALKPHOS 58  --    ALT 24  --    AST 26  --    BILITOT 3.4*  --      Labs within the past 24 hours have been reviewed.    Diagnostic Results:  I have personally reviewed all pertinent imaging studies.    Assessment/Plan:     * Acute kidney injury superimposed on chronic kidney disease    - VICKY initially improving with albumin infusion but with minimal intake 6/25  - Ur Na: 32, unlikely for HRS          Cellulitis of both lower extremities    - Cont vanc  - Monitor          Physical deconditioning    - PT consulted          Ascites due to  alcoholic cirrhosis    - Para 6/25 negative for infection          Coagulopathy    Secondary to decompensated liver disease          Cholestatic pruritus    - Hold Atarax for confusion          Hypoalbuminemia due to protein-calorie malnutrition    - Dietary consulted  - Will place taco when able and start TF          Decompensated hepatic cirrhosis    Pt on hold for insurance issues and severe malnutrition/deconditioned status + currently bacteremic    MELD-Na score: 26 at 6/26/2018  2:37 PM  MELD score: 26 at 6/26/2018  2:37 PM  Calculated from:  Serum Creatinine: 2.1 mg/dL at 6/26/2018  2:37 PM  Serum Sodium: 147 mmol/L (Rounded to 137) at 6/26/2018  2:37 PM  Total Bilirubin: 3.4 mg/dL at 6/26/2018  4:28 AM  INR(ratio): 2 at 6/26/2018  4:28 AM  Age: 63 years            Thrombocytopenia    Secondary to splenomegaly from portal HTN- should improve with txp  No s/s overt bleed          Seizures    - Oral Keppra transitioned to IV as pt not able to swallow  - EEG pending          Bilateral edema of lower extremity    IV furosemide, spironolactone PO and albumin infusion  -> hold diuretics again today with confusion  Pt with appearance of venous stasis with signif blistering/weeping  Consulted wound care  ABIs satisfactory          Hepatic encephalopathy    Acute on chronic- grade 3  - lactulose enema ordered + additional PO dose once ok to swallow, continue rifaximin 550 mg BID  - infx workup initiated: blood/urine/ascites  - Continue abx: cefepime/vanc --> pt with G+C in blood          G+C bacteremia: Continue vanc.  Repeat blood cx in AM to ensure clearance.      VTE Risk Mitigation         Ordered     heparin (porcine) injection 5,000 Units  Every 8 hours      06/22/18 1502     IP VTE HIGH RISK PATIENT  Once      06/22/18 1502          The patients clinical status was discussed at multidisplinary rounds, involving transplant surgery, transplant medicine, pharmacy, nursing, nutrition, and social work    Discharge  Planning:  No Patient Care Coordination Note on file.      Bebeto Virgen PA-C  Liver Transplant  [facility]

## 2018-06-26 NOTE — ASSESSMENT & PLAN NOTE
Pt on hold for insurance issues and severe malnutrition/deconditioned status + currently bacteremic    MELD-Na score: 26 at 6/26/2018  2:37 PM  MELD score: 26 at 6/26/2018  2:37 PM  Calculated from:  Serum Creatinine: 2.1 mg/dL at 6/26/2018  2:37 PM  Serum Sodium: 147 mmol/L (Rounded to 137) at 6/26/2018  2:37 PM  Total Bilirubin: 3.4 mg/dL at 6/26/2018  4:28 AM  INR(ratio): 2 at 6/26/2018  4:28 AM  Age: 63 years

## 2018-06-27 PROBLEM — R78.81 GRAM-POSITIVE BACTEREMIA: Status: ACTIVE | Noted: 2018-06-27

## 2018-06-27 LAB
ALBUMIN SERPL BCP-MCNC: 3 G/DL
ALP SERPL-CCNC: 63 U/L
ALT SERPL W/O P-5'-P-CCNC: 26 U/L
ANION GAP SERPL CALC-SCNC: 10 MMOL/L
AST SERPL-CCNC: 33 U/L
BASOPHILS # BLD AUTO: 0.03 K/UL
BASOPHILS NFR BLD: 0.8 %
BILIRUB SERPL-MCNC: 2.8 MG/DL
BUN SERPL-MCNC: 23 MG/DL
CALCIUM SERPL-MCNC: 9.8 MG/DL
CHLORIDE SERPL-SCNC: 119 MMOL/L
CHOLEST SERPL-MCNC: 53 MG/DL
CHOLEST/HDLC SERPL: 3.3 {RATIO}
CK MB SERPL-MCNC: 1.5 NG/ML
CK MB SERPL-RTO: 2.6 %
CK SERPL-CCNC: 58 U/L
CO2 SERPL-SCNC: 20 MMOL/L
CREAT SERPL-MCNC: 2 MG/DL
DIFFERENTIAL METHOD: ABNORMAL
EOSINOPHIL # BLD AUTO: 0.3 K/UL
EOSINOPHIL NFR BLD: 7 %
ERYTHROCYTE [DISTWIDTH] IN BLOOD BY AUTOMATED COUNT: 18.1 %
EST. GFR  (AFRICAN AMERICAN): 40.2 ML/MIN/1.73 M^2
EST. GFR  (NON AFRICAN AMERICAN): 34.7 ML/MIN/1.73 M^2
GLUCOSE SERPL-MCNC: 102 MG/DL
HCT VFR BLD AUTO: 24.6 %
HDLC SERPL-MCNC: 16 MG/DL
HDLC SERPL: 30.2 %
HGB BLD-MCNC: 8.3 G/DL
IMM GRANULOCYTES # BLD AUTO: 0.01 K/UL
IMM GRANULOCYTES NFR BLD AUTO: 0.3 %
INR PPP: 2.1
LDLC SERPL CALC-MCNC: 28.4 MG/DL
LYMPHOCYTES # BLD AUTO: 0.8 K/UL
LYMPHOCYTES NFR BLD: 19.3 %
MAGNESIUM SERPL-MCNC: 2.2 MG/DL
MCH RBC QN AUTO: 33.6 PG
MCHC RBC AUTO-ENTMCNC: 33.7 G/DL
MCV RBC AUTO: 100 FL
MONOCYTES # BLD AUTO: 0.8 K/UL
MONOCYTES NFR BLD: 19 %
NEUTROPHILS # BLD AUTO: 2.1 K/UL
NEUTROPHILS NFR BLD: 53.6 %
NONHDLC SERPL-MCNC: 37 MG/DL
NRBC BLD-RTO: 0 /100 WBC
PLATELET # BLD AUTO: 60 K/UL
PMV BLD AUTO: 11.5 FL
POTASSIUM SERPL-SCNC: 3.8 MMOL/L
PROT SERPL-MCNC: 5.4 G/DL
PROTHROMBIN TIME: 20.4 SEC
RBC # BLD AUTO: 2.47 M/UL
SODIUM SERPL-SCNC: 149 MMOL/L
TRIGL SERPL-MCNC: 43 MG/DL
TROPONIN I SERPL DL<=0.01 NG/ML-MCNC: 0.14 NG/ML
TROPONIN I SERPL DL<=0.01 NG/ML-MCNC: 0.14 NG/ML
VANCOMYCIN TROUGH SERPL-MCNC: 28.6 UG/ML
VANCOMYCIN TROUGH SERPL-MCNC: 32.2 UG/ML
WBC # BLD AUTO: 3.99 K/UL

## 2018-06-27 PROCEDURE — 80202 ASSAY OF VANCOMYCIN: CPT | Mod: NTX

## 2018-06-27 PROCEDURE — P9047 ALBUMIN (HUMAN), 25%, 50ML: HCPCS | Mod: JG,NTX | Performed by: PHYSICIAN ASSISTANT

## 2018-06-27 PROCEDURE — 87040 BLOOD CULTURE FOR BACTERIA: CPT | Mod: 59,NTX

## 2018-06-27 PROCEDURE — 25000003 PHARM REV CODE 250: Mod: NTX | Performed by: PHYSICIAN ASSISTANT

## 2018-06-27 PROCEDURE — 85025 COMPLETE CBC W/AUTO DIFF WBC: CPT | Mod: NTX

## 2018-06-27 PROCEDURE — 80061 LIPID PANEL: CPT | Mod: NTX

## 2018-06-27 PROCEDURE — 99233 SBSQ HOSP IP/OBS HIGH 50: CPT | Mod: NTX,,, | Performed by: PHYSICIAN ASSISTANT

## 2018-06-27 PROCEDURE — 93010 ELECTROCARDIOGRAM REPORT: CPT | Mod: NTX,,, | Performed by: INTERNAL MEDICINE

## 2018-06-27 PROCEDURE — 82550 ASSAY OF CK (CPK): CPT | Mod: NTX

## 2018-06-27 PROCEDURE — 97110 THERAPEUTIC EXERCISES: CPT | Mod: NTX

## 2018-06-27 PROCEDURE — 85610 PROTHROMBIN TIME: CPT | Mod: NTX

## 2018-06-27 PROCEDURE — 80053 COMPREHEN METABOLIC PANEL: CPT | Mod: NTX

## 2018-06-27 PROCEDURE — 84484 ASSAY OF TROPONIN QUANT: CPT | Mod: 91,NTX

## 2018-06-27 PROCEDURE — 25000003 PHARM REV CODE 250: Mod: NTX | Performed by: NURSE PRACTITIONER

## 2018-06-27 PROCEDURE — 82553 CREATINE MB FRACTION: CPT | Mod: NTX

## 2018-06-27 PROCEDURE — 97803 MED NUTRITION INDIV SUBSEQ: CPT | Mod: NTX

## 2018-06-27 PROCEDURE — 63600175 PHARM REV CODE 636 W HCPCS: Mod: NTX | Performed by: PHYSICIAN ASSISTANT

## 2018-06-27 PROCEDURE — 99223 1ST HOSP IP/OBS HIGH 75: CPT | Mod: NTX,,, | Performed by: INTERNAL MEDICINE

## 2018-06-27 PROCEDURE — 36415 COLL VENOUS BLD VENIPUNCTURE: CPT | Mod: NTX

## 2018-06-27 PROCEDURE — 80202 ASSAY OF VANCOMYCIN: CPT | Mod: 91,NTX

## 2018-06-27 PROCEDURE — 83735 ASSAY OF MAGNESIUM: CPT | Mod: NTX

## 2018-06-27 PROCEDURE — 20600001 HC STEP DOWN PRIVATE ROOM: Mod: NTX

## 2018-06-27 PROCEDURE — 99222 1ST HOSP IP/OBS MODERATE 55: CPT | Mod: 25,NTX,, | Performed by: INTERNAL MEDICINE

## 2018-06-27 PROCEDURE — 93005 ELECTROCARDIOGRAM TRACING: CPT | Mod: NTX

## 2018-06-27 RX ORDER — PROPRANOLOL HYDROCHLORIDE 10 MG/1
10 TABLET ORAL 2 TIMES DAILY
Status: DISCONTINUED | OUTPATIENT
Start: 2018-06-27 | End: 2018-07-02

## 2018-06-27 RX ORDER — ASPIRIN 81 MG/1
81 TABLET ORAL DAILY
Status: DISCONTINUED | OUTPATIENT
Start: 2018-06-27 | End: 2018-07-10

## 2018-06-27 RX ORDER — FAMOTIDINE 20 MG/1
20 TABLET, FILM COATED ORAL 2 TIMES DAILY
Status: DISCONTINUED | OUTPATIENT
Start: 2018-06-27 | End: 2018-06-27

## 2018-06-27 RX ORDER — ALBUMIN HUMAN 250 G/1000ML
25 SOLUTION INTRAVENOUS ONCE
Status: COMPLETED | OUTPATIENT
Start: 2018-06-27 | End: 2018-06-27

## 2018-06-27 RX ORDER — PANTOPRAZOLE SODIUM 40 MG/1
40 TABLET, DELAYED RELEASE ORAL 2 TIMES DAILY
Status: DISCONTINUED | OUTPATIENT
Start: 2018-06-27 | End: 2018-07-23

## 2018-06-27 RX ORDER — FUROSEMIDE 10 MG/ML
40 INJECTION INTRAMUSCULAR; INTRAVENOUS ONCE
Status: COMPLETED | OUTPATIENT
Start: 2018-06-27 | End: 2018-06-27

## 2018-06-27 RX ORDER — LEVETIRACETAM 500 MG/1
500 TABLET ORAL 2 TIMES DAILY
Status: DISCONTINUED | OUTPATIENT
Start: 2018-06-27 | End: 2018-07-23

## 2018-06-27 RX ORDER — FLUCONAZOLE 200 MG/1
200 TABLET ORAL DAILY
Status: DISCONTINUED | OUTPATIENT
Start: 2018-06-27 | End: 2018-07-12

## 2018-06-27 RX ADMIN — CEFTRIAXONE SODIUM 1 G: 1 INJECTION, POWDER, FOR SOLUTION INTRAMUSCULAR; INTRAVENOUS at 02:06

## 2018-06-27 RX ADMIN — FLUCONAZOLE 200 MG: 200 TABLET ORAL at 11:06

## 2018-06-27 RX ADMIN — LACTULOSE 30 G: 20 SOLUTION ORAL at 06:06

## 2018-06-27 RX ADMIN — FAMOTIDINE 20 MG: 20 TABLET ORAL at 09:06

## 2018-06-27 RX ADMIN — HEPARIN SODIUM 5000 UNITS: 5000 INJECTION, SOLUTION INTRAVENOUS; SUBCUTANEOUS at 02:06

## 2018-06-27 RX ADMIN — LACTULOSE 30 G: 20 SOLUTION ORAL at 11:06

## 2018-06-27 RX ADMIN — FUROSEMIDE 40 MG: 10 INJECTION, SOLUTION INTRAMUSCULAR; INTRAVENOUS at 11:06

## 2018-06-27 RX ADMIN — PANTOPRAZOLE SODIUM 40 MG: 40 TABLET, DELAYED RELEASE ORAL at 08:06

## 2018-06-27 RX ADMIN — RIFAXIMIN 550 MG: 550 TABLET ORAL at 09:06

## 2018-06-27 RX ADMIN — LEVETIRACETAM 500 MG: 500 TABLET, FILM COATED ORAL at 09:06

## 2018-06-27 RX ADMIN — LEVETIRACETAM 500 MG: 500 TABLET, FILM COATED ORAL at 08:06

## 2018-06-27 RX ADMIN — RIFAXIMIN 550 MG: 550 TABLET ORAL at 08:06

## 2018-06-27 RX ADMIN — PROPRANOLOL HYDROCHLORIDE 10 MG: 10 TABLET ORAL at 08:06

## 2018-06-27 RX ADMIN — LACTULOSE 30 G: 20 SOLUTION ORAL at 05:06

## 2018-06-27 RX ADMIN — ALBUMIN HUMAN 25 G: 0.25 SOLUTION INTRAVENOUS at 11:06

## 2018-06-27 RX ADMIN — ASPIRIN 81 MG: 81 TABLET, COATED ORAL at 11:06

## 2018-06-27 NOTE — SUBJECTIVE & OBJECTIVE
Past Medical History:   Diagnosis Date    Anticoagulant long-term use     Arthritis     Back pain     Cellulitis     Cirrhosis     Coronary artery disease     DM (diabetes mellitus)     Hearing loss     right ear    Hepatic encephalopathy     Hypertension     diagnosed today (11/25/2015) and prescribed toprol    Leg edema     Nephrolithiasis     JACK (obstructive sleep apnea)     Seizures     Splenomegaly        Past Surgical History:   Procedure Laterality Date    APPENDECTOMY      Open    BACK SURGERY      CHOLECYSTECTOMY      laprascopic    COLONOSCOPY N/A 1/25/2018    Procedure: COLONOSCOPY;  Surgeon: Lashawn Myles MD;  Location: [address]);  Service: Endoscopy;  Laterality: N/A;    LUMBAR DISCECTOMY      SPLENIC ARTERY EMBOLIZATION  04/08/2016    Dr Taveras    TONSILLECTOMY         Review of patient's allergies indicates:   Allergen Reactions    Nsaids (non-steroidal anti-inflammatory drug)      D/t to liver disease.    Tylenol [acetaminophen]      D/t liver disease.    Penicillins Other (See Comments)     Tolerated pip-tazo in 8/2016 without issue  Since childhood was told not to take it       No current facility-administered medications on file prior to encounter.      Current Outpatient Prescriptions on File Prior to Encounter   Medication Sig    ascorbic acid (VITAMIN C) 1000 MG tablet Take 1,000 mg by mouth once daily.    aspirin (ECOTRIN) 81 MG EC tablet Take 1 tablet (81 mg total) by mouth once daily.    atorvastatin (LIPITOR) 40 MG tablet TAKE 1 TABLET(40 MG) BY MOUTH EVERY DAY    docusate sodium (COLACE) 100 MG capsule Take 100 mg by mouth once daily.     ferrous gluconate 270 mg (27 mg iron) Tab Take 1 tablet by mouth once daily.    fish oil-omega-3 fatty acids 300-1,000 mg capsule Take 2 capsules (2 g total) by mouth once daily. (Patient taking differently: Take 2 g by mouth 2 (two) times daily. )    furosemide (LASIX) 80 MG tablet Take 1 tablet (80 mg total)  by mouth 2 (two) times daily.    GENERLAC 10 gram/15 mL solution Take 60 mLs (40 g total) by mouth 3 (three) times daily. 60mg three times per day (Patient taking differently: Take 60 mLs by mouth 3 (three) times daily. )    levETIRAcetam (KEPPRA) 500 MG Tab Take 1 tablet (500 mg total) by mouth 2 (two) times daily.    magnesium oxide (MAG-OX) 400 mg tablet Take 1 tablet (400 mg total) by mouth once daily.    ondansetron (ZOFRAN) 4 MG tablet Take 1 tablet (4 mg total) by mouth every 8 (eight) hours as needed for Nausea.    pantoprazole (PROTONIX) 40 MG tablet Take 1 tablet (40 mg total) by mouth 2 (two) times daily before meals.    rifAXIMin (XIFAXAN) 550 mg Tab Take 1 tablet (550 mg total) by mouth 2 (two) times daily.    sodium bicarbonate 650 MG tablet Take 1 tablet (650 mg total) by mouth 2 (two) times daily.    spironolactone (ALDACTONE) 100 MG tablet Take 2 tablets (200 mg total) by mouth 2 (two) times daily.    tramadol (ULTRAM) 50 mg tablet Take 50 mg by mouth every 6 (six) hours as needed for Pain.     Family History     None        Social History Main Topics    Smoking status: Never Smoker    Smokeless tobacco: Current User     Types: Chew    Alcohol use No    Drug use: No    Sexual activity: Not on file     Review of Systems   All other systems reviewed and are negative.    Objective:     Vital Signs (Most Recent):  Temp: 98.3 °F (36.8 °C) (06/27/18 0428)  Pulse: 97 (06/27/18 0428)  Resp: 18 (06/27/18 0428)  BP: (!) 148/66 (06/27/18 0428)  SpO2: 97 % (06/27/18 0428) Vital Signs (24h Range):  Temp:  [98.2 °F (36.8 °C)-98.8 °F (37.1 °C)] 98.3 °F (36.8 °C)  Pulse:  [] 97  Resp:  [17-18] 18  SpO2:  [97 %-100 %] 97 %  BP: (125-148)/(58-73) 148/66     Weight: (!) 139 kg (306 lb 7 oz)  Body mass index is 39.34 kg/m².    SpO2: 97 %  O2 Device (Oxygen Therapy): room air      Intake/Output Summary (Last 24 hours) at 06/27/18 0836  Last data filed at 06/27/18 0600   Gross per 24 hour   Intake              1840 ml   Output              605 ml   Net             1235 ml       Lines/Drains/Airways     Peripheral Intravenous Line                 Peripheral IV - Single Lumen 06/22/18 1830 Right Upper Arm 4 days         Peripheral IV - Single Lumen 06/26/18 1000 Left Other less than 1 day                Physical Exam  GEN: Alert and oriented in NAD  NECK: + JVD appreciated   CVS: RRR, s1/s2, 2/6 systolic murmur   PULM: + rales  ABD: NT/ND BS +  Extremities: warm and dry, palpable pulses, mild edema  NEURO: Alert and oriented x 3  PSYCH: appropriate affect.         Significant Labs:   Recent Lab Results       06/27/18  0611 06/27/18  0540 06/27/18  0255 06/27/18  0254 06/26/18  1437      Immature Granulocytes    0.3      Immature Grans (Abs)    0.01  Comment:  Mild elevation in immature granulocytes is non specific and   can be seen in a variety of conditions including stress response,   acute inflammation, trauma and pregnancy. Correlation with other   laboratory and clinical findings is essential.        Albumin    3.0(L) 2.8(L)     Alkaline Phosphatase    63      ALT    26      Anion Gap    10 8     AST    33      Baso #    0.03      Basophil%    0.8      Total Bilirubin    2.8  Comment:  For infants and newborns, interpretation of results should be based  on gestational age, weight and in agreement with clinical  observations.  Premature Infant recommended reference ranges:  Up to 24 hours.............<8.0 mg/dL  Up to 48 hours............<12.0 mg/dL  3-5 days..................<15.0 mg/dL  6-29 days.................<15.0 mg/dL  (H)      BUN, Bld    23 25(H)     Calcium    9.8 9.6     Chloride    119(H) 119(H)     Cholesterol 53  Comment:  The National Cholesterol Education Program (NCEP) has set the  following guidelines (reference ranges) for Cholesterol:  Optimal.....................<200 mg/dL  Borderline High.............200-239 mg/dL  High........................> or = 240 mg/dL  (L)         CO2    20(L)  "20(L)     CPK   58       CPK MB   1.5       Creatinine    2.0(H) 2.1(H)     Differential Method    Automated      eGFR if     40.2(A) 37.9(A)     eGFR if non     34.7  Comment:  Calculation used to obtain the estimated glomerular filtration  rate (eGFR) is the CKD-EPI equation.   (A) 32.7  Comment:  Calculation used to obtain the estimated glomerular filtration  rate (eGFR) is the CKD-EPI equation.   (A)     Eos #    0.3      Eosinophil%    7.0      Glucose    102 119(H)     Gran # (ANC)    2.1      Gran%    53.6      HDL 16  Comment:  The National Cholesterol Education Program (NCEP) has set the  following guidelines (reference values) for HDL Cholesterol:  Low...............<40 mg/dL  Optimal...........>60 mg/dL  (L)         HDL/Chol Ratio 30.2         Hematocrit    24.6(L)      Hemoglobin    8.3(L)      Coumadin Monitoring INR    2.1  Comment:  Coumadin Therapy:  2.0 - 3.0 for INR for all indicators except mechanical heart valves  and antiphospholipid syndromes which should use 2.5 - 3.5.  (H)      LDL Cholesterol 28.4  Comment:  The National Cholesterol Education Program (NCEP) has set the  following guidelines (reference values) for LDL Cholesterol:  Optimal.......................<130 mg/dL  Borderline High...............130-159 mg/dL  High..........................160-189 mg/dL  Very High.....................>190 mg/dL  (L)         Lymph #    0.8(L)      Lymph%    19.3      Magnesium    2.2      MB%   2.6  Comment:  To be positive, the MB% must be greater than 5% AND the CK-MB  greater than 6.5 ng/mL. Values not in the reference interval,   but not qualifying as positive, should be considered "trace".         MCH    33.6(H)      MCHC    33.7      MCV    100(H)      Mono #    0.8      Mono%    19.0(H)      MPV    11.5      Non-HDL Cholesterol 37  Comment:  Risk category and Non-HDL cholesterol goals:  Coronary heart disease (CHD)or equivalent (10-year risk of CHD >20%):  Non-HDL " cholesterol goal     <130 mg/dL  Two or more CHD risk factors and 10-year risk of CHD <= 20%:  Non-HDL cholesterol goal     <160 mg/dL  0 to 1 CHD risk factor:  Non-HDL cholesterol goal     <190 mg/dL           nRBC    0      Phosphorus     3.7     Platelets    60(L)      Potassium    3.8 4.3     Total Protein    5.4(L)      Protime    20.4(H)      RBC    2.47(L)      RDW    18.1(H)      Sodium    149(H) 147(H)     Total Cholesterol/HDL Ratio 3.3         Triglycerides 43  Comment:  The National Cholesterol Education Program (NCEP) has set the  following guidelines (reference values) for triglycerides:  Normal......................<150 mg/dL  Borderline High.............150-199 mg/dL  High........................200-499 mg/dL           Troponin I 0.140  Comment:  The reference interval for Troponin I represents the 99th percentile   cutoff   for our facility and is consistent with 3rd generation assay   performance.  (H)  0.138  Comment:  The reference interval for Troponin I represents the 99th percentile   cutoff   for our facility and is consistent with 3rd generation assay   performance.  (H)       Vancomycin-Trough  28.6(H)  32.2  Comment:  *Critical value -   Results called to and read back by:ADOLFO MCKEON RN  ()      WBC    3.99                      Significant Imaging: EKG: NSR no st t wave changes     Narrative    Date of Procedure: 01/26/2018    PRE-TEST DATA   EKG: Resting electrocardiogram reveals sinus rhythm at a rate of 69 bpm. There was low voltage.     TEST DESCRIPTION   The patient received a graduated infusion of Dobutamine beginning at 10.00 mcg/Kg/min to a peak dose of 30 mcg/Kg/min, achieving a peak heart rate of 141 bpm, which is 89% of the age predicted maximum heart rate. The patient also received a total of 0.5   mg of Atropine.     At peak infusion, EKG revealed < 1mm of upsloping ST segment depression in the inferolateral leads at a maximum heart rate of 141 bpm.     EKG  Conclusions:    1. The EKG portion of this study is negative for ischemia at a peak heart rate of 141 bpm (89% of predicted).   2. Blood pressure remained stable throughout the protocol  (Presenting BP: 133/54 Peak BP: 102/39).   3. The following arrhythmias were present: rare PVCs.   4. There were no symptoms of chest discomfort or significant dyspnea throughout the protocol.     Echocardiographic Description:  Technical Quality: This is a technically good study. This study was performed in conjunction with a 3ml intravenous injection of Optison contrast agent.     Aorta: The aortic root is normal in size, measuring 3.1 cm at sinotubular junction and 4.1 cm at Sinuses of Valsalva. The proximal ascending aorta is normal in size, measuring 3.4 cm across.     Left Atrium: The left atrial volume index is severely enlarged, measuring 49.98 cc/m2.     Left Ventricle: The left ventricle is normal in size, with an end-diastolic diameter of 4.5 cm, and an end-systolic diameter of 3.2 cm. LV wall thickness is normal, with the septum and the posterior wall each measuring 1.0 cm across. Relative wall   thickness was increased at 0.44, and the LV mass index was 66.1 g/m2 consistent with concentric remodeling. There are no regional wall motion abnormalities. Left ventricular systolic function appears normal. Visually estimated ejection fraction is   60-65%. The LV Doppler derived stroke volume equals 136.0 ccs.     Diastolic indices: E wave velocity 1.0 m/s, E/A ratio 1.0,  msec., E/e' ratio(avg) 9. Diastolic function is normal.     Right Atrium: The right atrium is normal in size, measuring 5.6 cm in length and 4.3 cm in width in the apical view.     Right Ventricle: The right ventricle is normal in size measuring 3.4 cm at the base in the apical right ventricle-focused view. Global right ventricular systolic function appears normal. Tricuspid annular plane systolic excursion (TAPSE) is 3.1 cm.   Tissue Doppler-derived  tricuspid annular peak systolic velocity (S prime) is 20.9 cm/s.     Aortic Valve:  The aortic valve is normal in structure with normal leaflet mobility. The aortic valve is tri-leaflet in structure.     Mitral Valve:  The mitral valve is normal in structure with normal leaflet mobility.     Tricuspid Valve:  The tricuspid valve is normal in structure with normal leaflet mobility.     Intracavitary: There is no evidence of pericardial effusion, intracavity mass, thrombi, or vegetation.     Peak Imaging:    Images taken at peak infusion showed left ventricular function augmenting. Left ventricular end systolic volume decreases.     No dobutamine induced wall motion abnormalities were identified.       CONCLUSIONS     1 - Normal left ventricular systolic function (EF 60-65%).     2 - No wall motion abnormalities.     3 - Concentric remodeling.     4 - Severe left atrial enlargement.     5 - Normal left ventricular diastolic function.     6 - Normal right ventricular systolic function .     No evidence of stress induced myocardial ischemia.       Date of Procedure: 02/01/2017    PRE-TEST DATA   EKG: EKG demonstrates adequate. Resting electrocardiogram reveals sinus rhythm at a rate of 76 bpm.     Indication for Stress Test:  Pre-liver transplant    Clinical Notes:  Diabetes and CAD    TEST DESCRIPTION   The patient received 60 mg of Dipyridamole over 4 minutes. Peak heart rate was 83 bpm, which is 52% of the age predicted maximum heart rate. .     EKG Conclusions:    1. The EKG portion of this study is negative for ischemia at a peak heart rate of 83 bpm (52% of predicted).   2. Blood pressure remained stable throughout the protocol  (Presenting BP: 132/57 Peak BP: 100/46).   3. No significant arrhythmias were present.   4. There were no symptoms of chest discomfort or significant dyspnea throughout the protocol.     Protocol:  After explaining the risk, benefits, and alternatives of IV Dipyridamole PET perfusion  scintigraphy and after obtaining informed consent, the patient was positioned in the Positron Attrius. After aligning the sternal angle of Bobby with the laser defining   the upper edge of the field of view, positioning was confirmed with a 7 minute attenuation transmission scan. This was followed by a 60 mCi. Rubidium 82 injection at rest.  A 7 minute emission acquisition scan was performed 70 seconds after termination   of the infusion.     Subsequently, Dipyridamole pharmacologic stress testing was performed as described above. 3 minutes following the cessation of the Dipyridamole infusion, 60 mCi of Rubidium 82 was infused and a 7 minute emission acquisition scan performed 70 seconds   after termination of the infusion. This was followed by a 7 minute attenuation transmission scan. Subsequently, images were processed VLA, HLA and short axis views in addition to polar tomographic displays and pseudo topographic displays. The site of the   IV injection was the right AC.     Inspection of the transaxial images demonstrated significant left lateral patient motion in the camera between rest and stress acquisitions. This was compensated for with the computer assisted shift correction algorithm.     COMMENTS  This is a technically excellent study.   Relative Myocardial Perfusion Images: The relative PET images show the following:  On resting images, there are no significant perfusion defects. With stress, a mild intensity, very small sized perfusion defect developed in the inferoapical wall.     Other Findings:  The extracardiac distribution of radioactivity is normal. The left ventricular cavity is normal in size and does not increase with stress. On gated SPECT, left ventricular motion is normal at rest. On gated SPECT, left ventricular motion is normal at   stress.     Nuclear Quantitative Functional Analysis:   At rest:  LVEF: 57 % (normal is >= 51%)  LVED Volume: 138 ml (normal is <=171)  LVES Volume: 60 ml (normal  is <=70)  At stress:  LVEF: 56 % (normal is >= 51%)  LVED Volume: 145 ml (normal is <=171)  LVES Volume: 64 ml (normal is <=70)    CONCLUSIONS: ABNORMAL MYOCARDIAL PERFUSION PET STRESS TEST  1. There is a very small sized mild ischemia in the inferoapical wall in the usual distribution of the Posterior Descending Artery. This defect comprises < 5 % of the left ventricular myocardium.   2. Resting wall motion is physiologic. Stress wall motion is physiologic.   3. LV function is normal at rest and stress.  (normal is >= 51%)  4. The ventricular volumes are normal at rest and stress.   5. The extracardiac distribution of radioactivity is normal.   6. There was no previous study available to compare.    Date of Procedure:  03/16/2017    A. Indication/Pre-Operative Diagnosis     The patient is a 61 year old male that was referred for catheterization by Miriam García for abnormal CV function study and pre-op eval before non-cardiac surgery.     B. Summary/Post-Operative Diagnosis       Two vessel coronary artery disease.    Successful PCI.    C. HPI     I have reviewed the history and physical completed on 03/16/2017. The patient has been examined and I concur with the findings from 03/16/2017.     Patient history was obtained from the patient.     Counseling: The patient was counseled regarding the potential risks and benefits of this procedure, as well as possible alternative approaches to the problem and gave informed consent.    The ASA Score was Class II.     [facility] Risk Score for MACE is 1.20%. [facility] Risk Score for Death is 0.10%.     Height: 74 in.      Weight: 300 lbs.      BMI: 38.50 kg/m2    OUTPATIENT MEDICATIONS: Medications were reviewed.  ALLERGIES: Allergies were reviewed.    Laboratory data revealed:     02/14/2017 INR  1.3, PTI  13.6   03/16/2017 GLU  130, HCT  32.8, HGB  11.9, K  4.2, GRPE  A POS, NA  135, PLT  44, CREAT  1.2, WBCIR  4.91, BUN  18                D. Angiographic Results      Diagnostic:          Patient has a right dominant coronary artery.        - Left Main Coronary Artery:             The LM is normal. There is KALEB 3 flow.     - Left Anterior Descending Artery:             The distal LAD has a 75% stenosis. There is KALEB 3 flow.                     Lesion Details:   The length is 10mm. The minimum luminal diameter is 0.4 millimeters. The reference vessel diameter is 2.5 millimeters.     - Left Circumflex Artery:             The LCX is normal. There is KALEB 3 flow.     - Right Coronary Artery:             The RCA is normal. There is KALEB 3 flow.     - Posterior Descending Artery:             The ostial PDA has a 75% stenosis. There is KALEB 3 flow.                     Lesion Details:   This is a Type C lesion.  The length is 10mm. Bifurcation is present. The minimum luminal diameter is 0.4 millimeters. The reference vessel diameter is 2.5 millimeters.      Intervention          Distal LAD:              The lesion was successfully intervened. Post-stenosis of 0%, post-KALEB 3 flow and TMP grade 3. The vessel was accessed natively.  The following items were used: 2.7H84Z319 Euphora SC Balloon and Stent Integrity 2.5 X 14.       Ostial PDA:              The lesion was successfully intervened. Post-stenosis of 0%. The vessel was accessed natively.  The following items were used: 2.4H46J883 Euphora SC Balloon and Stent Integrity 2.5 X 14.

## 2018-06-27 NOTE — ASSESSMENT & PLAN NOTE
Pt on hold for insurance issues and severe malnutrition/deconditioned status + currently bacteremic    MELD-Na score: 25 at 6/27/2018  2:54 AM  MELD score: 25 at 6/27/2018  2:54 AM  Calculated from:  Serum Creatinine: 2 mg/dL at 6/27/2018  2:54 AM  Serum Sodium: 149 mmol/L (Rounded to 137) at 6/27/2018  2:54 AM  Total Bilirubin: 2.8 mg/dL at 6/27/2018  2:54 AM  INR(ratio): 2.1 at 6/27/2018  2:54 AM  Age: 63 years

## 2018-06-27 NOTE — ASSESSMENT & PLAN NOTE
- Oral Keppra transitioned to IV as pt not able to swallow 6/26  - Resumed oral Keppra since mentation improved  - EEG 6/26 negative

## 2018-06-27 NOTE — NURSING
Throughout the night, pt ate 2 jellos and drank 2 pitchers of ice water.  Pt also consumed half of a dr pepper.

## 2018-06-27 NOTE — ASSESSMENT & PLAN NOTE
Acute on chronic- grade 2 6/27  - improved from grade 3 with lactulose enemas 6/25 + 6/26  - continue rifaximin 550 mg BID  - infx workup initiated: blood/urine/ascites  - Continue abx: cefepime/vanc --> pt with G+C in blood

## 2018-06-27 NOTE — PLAN OF CARE
Problem: Patient Care Overview  Goal: Plan of Care Review  Outcome: Ongoing (interventions implemented as appropriate)  Pt more oriented.  Able to respond appropriately.  All doses of oral lactulose being administered.  Pt had one bowel movement this shift so far.  Pt incontinent of urine and stool.  IV keppra given.  PIV is intact and free of infection.  Bed alarm is on for pt safety and avavys camera in use.  Family is at the bedside.  Bed is in lowest position, call bell is in reach, and pt instructed to call nurse when needing assistance.  Will continue to monitor.

## 2018-06-27 NOTE — HPI
64 y/o M h/o ETOH cirrhosis (PSE, pHTN) listed for liver txp, CKD3, CAD recently admitted for worsening PSE  requiring intubation admitted from clinic with hypervolemia, VICKY and b/l LE erythema. Here he has been afebrile, with diagnostic paracentesis unremarkable, found to have CONS 2/4 blood culture bottles with repeat negative.  He has been on empiric vancomycin and ceftriaxone and b/l LE erythema has improved and overall (combined with other interventions) his mental status has improved.  He remains deconditioned.    7/19: reconsulted for hypothermia. Pt was treated for 7 days on vancomycin for treatment of cellulitis, blood culture that was + for CONs felt to be contaminate. He has been persistently hypothermic, requiring warming blanket for the past day. Pt states that he otherwise feels well. He was started on vanc and cefepime for empiric treatment.

## 2018-06-27 NOTE — PLAN OF CARE
Problem: Patient Care Overview  Goal: Plan of Care Review  Outcome: Ongoing (interventions implemented as appropriate)  Pt is AAOx4, 2 person assist, and VSS. Lactulose administered-5 BMs resulted. Pt c/o of right shoulder pain- team aware.Applied barrier cream to buttock and lotion to bilateral extremities. Heparin injection given. Albumin given. Pt was up in chair for over an hour. Pt's family at bedside. Avasys camera in room. Pt's bed in lowest position, call bell within reach, and nonskid socks on.

## 2018-06-27 NOTE — PROGRESS NOTES
[facility]  Liver Transplant  Progress Note    Patient Name: Alon Adamson  MRN: [id number]  Admission Date: 6/22/2018  Hospital Length of Stay: 5 days  Code Status: Full Code  Primary Care Provider: Mckinley Vides MD  Post-Operative Day:     ORGAN:     Disease Etiology: Alcoholic Cirrhosis  Donor Type:     CDC High Risk:     Donor CMV Status:   Donor CMV Status:   Donor HBcAB:     Donor HCV Status:     Donor HBV TAMAR: Organ record is missing.  Donor HCV TAMAR: Organ record is missing.  Whole or Partial:   Biliary Anastomosis:   Arterial Anatomy:   Subjective:     History of Present Illness:  Mr. Adamson is a 64 yo M with PMH ESLD secondary to ETOH cirrhosis, CKD3, CAD.  Complications of liver disease include hyperbilirubinemia, hypoalbuminemia, severe malnutrition,   hepatic encephalopathy, thrombocytopenia, splenomegaly, ascites, hypervolemia, coagulopathy, portal HTN.  Pt recently admitted for severe hepatic encephalopathy requiring intubation but has been stable from mentation standpoint post resolution of acute enceophalopathy.      Mr. Adamson was seen in clinic 6/22 and was noted to have significant hypervolemia, weeping from BLE, and VICKY on CKD3 on routine labs.  His MELD increased from 22 to 26 per labs- coordinator notified and MELD updated.  Cr elevated to 2.3 from baseline of 1.4.  Also with worsening ascites.  Pt reported increased pruritis, generalized fatigue and weakness.  He presented in wheelchair.  ROS otherwise negative.      Pt started on IV diuretics 6/22 and continued 6/23 and 6/24.  BLE edema and ascites signif improved with diuresis and kidney function also improved.  Pt noted to have increased asterixis  6/24.  Upon admission, he was noted to have grade 1 hepatic encephalopathy which remained until 6/24 when grade 1-2 HE was noted.  Lactulose increased until pt had a BM- he had not had BM during previous day.  On 6/25 AM, pt with signif worsened encephalopathy opening eyes  to verbal and tactile stimuli only.  Pt given lactulose enemas, infx workup sent, started on IV abx, hydrated with albumin, diuretics discontinued, CT head obtained and negative.  Paracentesis 6/25 negative for peritonitis per cell count and U/A negative. Pt also with concern for BLE cellulitis- upper legs bright red with lower BLE still with dark appearance of venous stasis.  Placed on vancomycin with improvement in BLE redness 6/26.  Cr improved from 2/0 -> 1.9.  Pt began to improve but again returned to previous stated on 6/28.  Pt given more lactulose enemas and by 6/26 PM, was able to answer some orientation questions correctly which was a significant improvement.  Of note, taco placement attempted 6/26 but unable secondary to agitation upon insertion prompting self resolving nose bleed.  Micro lab called 6/26 PM with blood cx + for G+C in blood- pt continued on vanc which was started 6/25.   EEG obtained 6/26 as pt with h/o serizures and negative.  Pt continued on home Keppra.      Pt eating minimally during period of encephalopathy requiring gently hydration with IVF 6/26.  As previously stated, unable to place taco for nutrition.  VICKY initially improved with albumin + diuretics but increased again during period of encephalopathy.    Hospital Course:  Interval hx:  Pt significantly improved mentation this AM- alert and oriented to person, place, and time and able to have conversation.  Still with G1-2 encephalopathy- + asterixis and some delayed responses/confusion.  Blood cx repeated today to assess for clearance.  ID consulted to obtain tx recs  Pt with pulm edema noted - lasix + albumin resumed.  Monitor kidney function closely as still with VICKY on CKD 3.  Eating much better now that confusion improved.  Supplements ordered.  Will need to work with PT prior to discharge now that confusion improved to see outpt plan- deconditioned at admit and likely worse with 48 hours of severe encephalopathy.    Of note,  with chest pain overnight that resolved without intervention and pt reported as mild.  EKG NSR with PVC, troponin 0.1 in setting of hypervolemia + VICKY.  Normal dobutamine stress 1/2018.  Cardiology consulted and felt not ACS, no need for further workup other than continue to treat current risk factors for CAD with ASA (pt already on ASA and statin as outpt) + BB for portal hypertension which was started.    Scheduled Meds:   aspirin  81 mg Oral Daily    cefTRIAXone (ROCEPHIN) IVPB  1 g Intravenous Q24H    fluconazole  200 mg Oral Daily    heparin (porcine)  5,000 Units Subcutaneous Q8H    lactulose  30 g Oral Q6H    levETIRAcetam  500 mg Oral BID    pantoprazole  40 mg Oral BID    propranolol  10 mg Oral BID    rifAXImin  550 mg Oral BID     Continuous Infusions:  PRN Meds:acetaminophen, diphenhydrAMINE-zinc acetate 1-0.1%, lactulose, ondansetron, sodium chloride 0.9%    Review of Systems   Constitutional: Positive for activity change and appetite change. Negative for chills, diaphoresis, fatigue and fever.   Respiratory: Positive for shortness of breath (with minimal exertion). Negative for cough.    Cardiovascular: Positive for leg swelling. Negative for chest pain and palpitations.   Gastrointestinal: Positive for abdominal distention and diarrhea (with lactulose). Negative for abdominal pain, blood in stool, constipation, nausea and vomiting.   Genitourinary: Negative for decreased urine volume, dysuria and frequency.   Musculoskeletal: Negative for arthralgias.   Neurological: Positive for tremors and weakness. Negative for headaches.   Psychiatric/Behavioral: Positive for confusion.     Objective:     Vital Signs (Most Recent):  Temp: 97.7 °F (36.5 °C) (06/27/18 1149)  Pulse: 88 (06/27/18 1149)  Resp: 17 (06/27/18 1149)  BP: (!) 156/67 (06/27/18 1149)  SpO2: 99 % (06/27/18 1149) Vital Signs (24h Range):  Temp:  [97.7 °F (36.5 °C)-98.8 °F (37.1 °C)] 97.7 °F (36.5 °C)  Pulse:  [] 88  Resp:   [17-18] 17  SpO2:  [97 %-100 %] 99 %  BP: (125-156)/(53-67) 156/67     Weight: (!) 139 kg (306 lb 7 oz)  Body mass index is 39.34 kg/m².    Intake/Output - Last 3 Shifts       06/25 0700 - 06/26 0659 06/26 0700 - 06/27 0659 06/27 0700 - 06/28 0659    P.O. 0 1640 1900    IV Piggyback  200     Total Intake(mL/kg) 0 (0) 1840 (13.2) 1900 (13.7)    Urine (mL/kg/hr) 0 (0) 605 (0.2) 200 (0.2)    Emesis/NG output 100 (0)      Other 1000 (0.3)      Stool 0 (0)      Blood 0 (0)      Total Output 1100 605 200    Net -1100 +1235 +1700           Urine Occurrence 0 x 3 x     Stool Occurrence 2 x 4 x 1 x    Emesis Occurrence 0 x            Physical Exam   Constitutional: He is oriented to person, place, and time. No distress.   Temporal and distal extremity muscle wasting   HENT:   Head: Normocephalic and atraumatic.   Mouth/Throat: No oropharyngeal exudate.   Several broken teeth; poor dentition   Eyes: Scleral icterus is present.   Cardiovascular: Normal rate, regular rhythm and normal heart sounds.    Pulmonary/Chest: Effort normal. He has rales (BLL).   Abdominal: Soft. Bowel sounds are normal. He exhibits distension (ascites). There is no tenderness.   Musculoskeletal: He exhibits edema (2+ BLE).   Neurological: He is alert and oriented to person, place, and time.   + asterixis   Skin: He is not diaphoretic. There is erythema.   BLE crackling, scaly skin   Psychiatric:   + confusion though improved   Nursing note and vitals reviewed.      Laboratory:  Immunosuppressants     None        CBC:   Recent Labs  Lab 06/27/18  0254   WBC 3.99   RBC 2.47*   HGB 8.3*   HCT 24.6*   PLT 60*   *   MCH 33.6*   MCHC 33.7     CMP:   Recent Labs  Lab 06/27/18  0254      CALCIUM 9.8   ALBUMIN 3.0*   PROT 5.4*   *   K 3.8   CO2 20*   *   BUN 23   CREATININE 2.0*   ALKPHOS 63   ALT 26   AST 33   BILITOT 2.8*     Coagulation:   Recent Labs  Lab 06/27/18  0254   INR 2.1*     Labs within the past 24 hours have been  reviewed.    Diagnostic Results:  I have personally reviewed all pertinent imaging studies.    Assessment/Plan:     * Acute kidney injury superimposed on chronic kidney disease    - VICKY initially improving with albumin infusion but with minimal intake 6/25 and 6/26  - Ur Na: 32, unlikely for HRS  - Albumin + lasix today          Gram-positive bacteremia    - Vanc started 6/25  - Initial + cx 6/25  - Repeat cx 6/27  - ID consulted 6/27 for tx course + any additional recs          Dermatitis associated with moisture              Cellulitis of both lower extremities    - Cont vanc --> erythema signif improved  - Monitor          Physical deconditioning    - PT consulted          Ascites due to alcoholic cirrhosis    - Para 6/25 negative for infection          Coagulopathy    Secondary to decompensated liver disease          Cholestatic pruritus    - Hold Atarax for confusion          Hypoalbuminemia due to protein-calorie malnutrition    - Dietary consulted  - Pt eating well today and boost supplement ordered          Decompensated hepatic cirrhosis    Pt on hold for insurance issues and severe malnutrition/deconditioned status + currently bacteremic    MELD-Na score: 25 at 6/27/2018  2:54 AM  MELD score: 25 at 6/27/2018  2:54 AM  Calculated from:  Serum Creatinine: 2 mg/dL at 6/27/2018  2:54 AM  Serum Sodium: 149 mmol/L (Rounded to 137) at 6/27/2018  2:54 AM  Total Bilirubin: 2.8 mg/dL at 6/27/2018  2:54 AM  INR(ratio): 2.1 at 6/27/2018  2:54 AM  Age: 63 years            Coronary artery disease involving native coronary artery of native heart without angina pectoris    - Continue home ASA          Thrombocytopenia    Secondary to splenomegaly from portal HTN- should improve with txp  No s/s overt bleed          Seizures    - Oral Keppra transitioned to IV as pt not able to swallow 6/26  - Resumed oral Keppra since mentation improved  - EEG 6/26 negative        Bilateral edema of lower extremity    IV furosemide,  spironolactone PO and albumin infusion  -> repeat today 6/27  Pt with appearance of venous stasis with signif blistering/weeping  Consulted wound care  ABIs satisfactory          Hepatic encephalopathy    Acute on chronic- grade 2 6/27  - improved from grade 3 with lactulose enemas 6/25 + 6/26  - continue rifaximin 550 mg BID  - infx workup initiated: blood/urine/ascites  - Continue abx: cefepime/vanc --> pt with G+C in blood              VTE Risk Mitigation         Ordered     heparin (porcine) injection 5,000 Units  Every 8 hours      06/22/18 1502     IP VTE HIGH RISK PATIENT  Once      06/22/18 1502          The patients clinical status was discussed at multidisplinary rounds, involving transplant surgery, transplant medicine, pharmacy, nursing, nutrition, and social work    Discharge Planning:  Unclear d/c date    Bebeto Virgen PA-C  Liver Transplant  [facility]

## 2018-06-27 NOTE — CONSULTS
[facility]  Adult Nutrition  Consult Note    SUMMARY     Recommendations    Recommendation/Intervention:   Pt now with increased alertness and intake.   Consider liberbalizing diet by taking off diabetic restriction. Continue Novasource Renal ONS.     Pt does not meet criteria for malnutrition at this time. Noted temple wasting however pt's weight stable x 1 year (noted edema), no other wasting throughout clavicle region or scapula, hands and feet. Pt historically with adequate po intake during previous admissions.    Pt at risk for malnutrition if po declines and remains suboptimal.   If po intake declines and NG placed, recommend noctural feeds to supplement intake.   Isosource 1.5 @ goal rate 70mL/hr x 12 hours nightly to provide 1260kcals, 57g protein and 642mL free water.     RD to monitor.    Goals: pt to consume nutrients >/= 85% EEN/EPN   Nutrition Goal Status: progressing towards goal  Communication of RD Recs: reviewed with RN    Reason for Assessment    Reason for Assessment: RD follow-up  Diagnosis:  (Acute kidney injury superimposed on chronic kidney disease )  Relevant Medical History: DM, Etoh abuse, Cirrhosis, HTN  Interdisciplinary Rounds: did not attend  General Information Comments: Pt's appetite has improved over last few days. More alert and oriented, answering questions appropriately. NFPE completed. Unable to diagnosis malnutrition at this time. Noted pt with muscle wasting in temples however pt with stable weight over past year, no muscle loss in interossesous muscles, scapula and clavicles. Edema likely from underlying liver disease and not from malnutrition. Pt at risk for malnutrition however pt with previous hx of fair intake with ONS during previous admissions.   Nutrition Discharge Planning: Adequate PO intake    Nutrition Risk Screen    Nutrition Risk Screen: no indicators present    Nutrition/Diet History    Patient Reported Diet/Restrictions/Preferences: low  "salt  Typical Food/Fluid Intake: Pt ate most of bkfst this morning, tolerating increased intake with each meal. Novasource renal ONS ordered. Pt agrees to drink. NG tube not placed yesterday, possibly held today given pt's increased alertness and intake.  Food Preferences: No coffee, no tea, likes Jell-O  Do you have any cultural, spiritual, Mu-ism conflicts, given your current situation?: none noted  Food Allergies: NKFA  Factors Affecting Nutritional Intake: None identified at this time    Anthropometrics    Temp: 97.7 °F (36.5 °C)  Height: 6' 2" (188 cm)  Height (inches): 74 in  Weight Method: Bed Scale  Weight: (!) 139 kg (306 lb 7 oz)  Weight (lb): (!) 306.44 lb  Ideal Body Weight (IBW), Male: 190 lb  % Ideal Body Weight, Male (lb): 161.28 lb  BMI (Calculated): 39.4  BMI Grade: 35 - 39.9 - obesity - grade II       Lab/Procedures/Meds    Pertinent Labs Reviewed: reviewed  Pertinent Labs Comments: Na 149, Cr 2.0, Ph 3.7, K 3.8, HgbA1c 5.7  Pertinent Medications Reviewed: reviewed  Pertinent Medications Comments: lactulose    Physical Findings/Assessment    Overall Physical Appearance: edematous, obese  Oral/Mouth Cavity: tooth/teeth missing  Skin: edema    Estimated/Assessed Needs    Weight Used For Calorie Calculations: (!) 138.2 kg (304 lb 10.8 oz)  Energy Calorie Requirements (kcal): 2702 kcal/d  Energy Need Method: Ute- Brunoor  Protein Requirements: 138g/d (1.0g/kg)  Weight Used For Protein Calculations: (!) 138.2 kg (304 lb 10.8 oz)        RDA Method (mL): 2702         Nutrition Prescription Ordered    Current Diet Order: diabetic, low sodium  Oral Nutrition Supplement: Novasource Renal TID    Evaluation of Received Nutrient/Fluid Intake    I/O: -I/O  Comments: LBM 6/25    % Intake of Estimated Energy Needs: 50 - 75 %  % Meal Intake: 50 - 75 %    Nutrition Risk    Level of Risk/Frequency of Follow-up:  (f/u 1x/week)     Assessment and Plan    Ascites due to alcoholic cirrhosis     Contributing " Nutrition Diagnosis  Altered nutritional labs     Related to (etiology):   Ascites, VICKY, ESLD     Signs and Symptoms (as evidenced by):   Crt-2.1, GFR-37.9, T.traci-2.4, Alb-2.6     Interventions/Recommendations (treatment strategy):        Nutrition Diagnosis Status:   Improving            Monitor and Evaluation    Food and Nutrient Intake: energy intake, food and beverage intake, enteral nutrition intake  Food and Nutrient Adminstration: diet order, enteral and parenteral nutrition administration  Knowledge/Beliefs/Attitudes: food and nutrition knowledge/skill  Physical Activity and Function: nutrition-related ADLs and IADLs  Anthropometric Measurements: weight, weight change  Biochemical Data, Medical Tests and Procedures:  (All labs)  Nutrition-Focused Physical Findings: overall appearance, skin     Nutrition Follow-Up    RD Follow-up?: Yes

## 2018-06-27 NOTE — CONSULTS
[facility]  Infectious Disease  Consult Note    Patient Name: Alon Adamson  MRN: [id number]  Admission Date: 6/22/2018  Hospital Length of Stay: 5 days  Attending Physician: Alma Joyce MD  Primary Care Provider: Mckinley Vides MD     Isolation Status: No active isolations    Patient information was obtained from patient and ER records.      Inpatient consult to Infectious Diseases  Consult performed by: GABRIELA BURNS  Consult ordered by: DIANNA BENTLEY  Reason for consult: cellulitis  Assessment/Recommendations: See below        Assessment/Plan:     Cellulitis of both lower extremities    64 y/o M h/o ETOH cirrhosis (PSE, pHTN) listed for liver txp, CKD3, CAD recently admitted for worsening PSE  requiring intubation admitted from clinic with hypervolemia, VICKY and b/l LE erythema. Here he has been afebrile, with diagnostic paracentesis unremarkable, found to have CONS 2/4 blood culture bottles with repeat negative.  He has been on empiric vancomycin and ceftriaxone and b/l LE erythema has improved and overall (combined with other interventions) his mental status has improved.  He remains deconditioned  - can finish 7 day total course of vancomycin dosed by level with goal trough 10-15 to complete course for LE cellulitis as has already had nice improvement.  Suspect bcx CONS is contaminant however given LE cellulitis and confusion possibly truly pathogenic but will be treated with vancomycin  - discussed with team will sign off please call back if questions            Thank you for your consult. I will sign off. Please contact us if you have any additional questions.    Gabriela Burns MD  Infectious Disease  [facility]    Subjective:     Principal Problem: Acute kidney injury superimposed on chronic kidney disease    HPI: 64 y/o M h/o ETOH cirrhosis (PSE, pHTN) listed for liver txp, CKD3, CAD recently admitted for worsening PSE  requiring intubation  admitted from clinic with hypervolemia, VICKY and b/l LE erythema. Here he has been afebrile, with diagnostic paracentesis unremarkable, found to have CONS 2/4 blood culture bottles with repeat negative.  He has been on empiric vancomycin and ceftriaxone and b/l LE erythema has improved and overall (combined with other interventions) his mental status has improved.  He remains deconditioned    Past Medical History:   Diagnosis Date    Anticoagulant long-term use     Arthritis     Back pain     Cellulitis     Cirrhosis     Coronary artery disease     DM (diabetes mellitus)     Hearing loss     right ear    Hepatic encephalopathy     Hypertension     diagnosed today (11/25/2015) and prescribed toprol    Leg edema     Nephrolithiasis     JACK (obstructive sleep apnea)     Seizures     Splenomegaly        Past Surgical History:   Procedure Laterality Date    APPENDECTOMY      Open    BACK SURGERY      CHOLECYSTECTOMY      laprascopic    COLONOSCOPY N/A 1/25/2018    Procedure: COLONOSCOPY;  Surgeon: Lashawn Myles MD;  Location: [address]);  Service: Endoscopy;  Laterality: N/A;    LUMBAR DISCECTOMY      SPLENIC ARTERY EMBOLIZATION  04/08/2016    Dr Taveras    TONSILLECTOMY         Review of patient's allergies indicates:   Allergen Reactions    Nsaids (non-steroidal anti-inflammatory drug)      D/t to liver disease.    Tylenol [acetaminophen]      D/t liver disease.    Penicillins Other (See Comments)     Tolerated pip-tazo in 8/2016 without issue  Since childhood was told not to take it       Medications:  Prescriptions Prior to Admission   Medication Sig    ascorbic acid (VITAMIN C) 1000 MG tablet Take 1,000 mg by mouth once daily.    aspirin (ECOTRIN) 81 MG EC tablet Take 1 tablet (81 mg total) by mouth once daily.    atorvastatin (LIPITOR) 40 MG tablet TAKE 1 TABLET(40 MG) BY MOUTH EVERY DAY    docusate sodium (COLACE) 100 MG capsule Take 100 mg by mouth once daily.     ferrous  gluconate 270 mg (27 mg iron) Tab Take 1 tablet by mouth once daily.    fish oil-omega-3 fatty acids 300-1,000 mg capsule Take 2 capsules (2 g total) by mouth once daily. (Patient taking differently: Take 2 g by mouth 2 (two) times daily. )    furosemide (LASIX) 80 MG tablet Take 1 tablet (80 mg total) by mouth 2 (two) times daily.    GENERLAC 10 gram/15 mL solution Take 60 mLs (40 g total) by mouth 3 (three) times daily. 60mg three times per day (Patient taking differently: Take 60 mLs by mouth 3 (three) times daily. )    levETIRAcetam (KEPPRA) 500 MG Tab Take 1 tablet (500 mg total) by mouth 2 (two) times daily.    magnesium oxide (MAG-OX) 400 mg tablet Take 1 tablet (400 mg total) by mouth once daily.    ondansetron (ZOFRAN) 4 MG tablet Take 1 tablet (4 mg total) by mouth every 8 (eight) hours as needed for Nausea.    pantoprazole (PROTONIX) 40 MG tablet Take 1 tablet (40 mg total) by mouth 2 (two) times daily before meals.    rifAXIMin (XIFAXAN) 550 mg Tab Take 1 tablet (550 mg total) by mouth 2 (two) times daily.    sodium bicarbonate 650 MG tablet Take 1 tablet (650 mg total) by mouth 2 (two) times daily.    spironolactone (ALDACTONE) 100 MG tablet Take 2 tablets (200 mg total) by mouth 2 (two) times daily.    tramadol (ULTRAM) 50 mg tablet Take 50 mg by mouth every 6 (six) hours as needed for Pain.     Antibiotics     Start     Stop Route Frequency Ordered    06/25/18 1500  cefTRIAXone injection 1 g      -- IV Every 24 hours (non-standard times) 06/25/18 1044    06/22/18 2100  rifAXIMin tablet 550 mg      -- Oral 2 times daily 06/22/18 1719        Antifungals     Start     Stop Route Frequency Ordered    06/27/18 1130  fluconazole tablet 200 mg      -- Oral Daily 06/27/18 1125        Antivirals     None           Immunization History   Administered Date(s) Administered    Hepatitis A, Adult 10/30/2015, 05/12/2016    Hepatitis B, Adult 10/30/2015, 11/25/2015, 05/12/2016, 11/22/2016       Family  History     None        Social History     Social History    Marital status:      Spouse name: N/A    Number of children: N/A    Years of education: N/A     Social History Main Topics    Smoking status: Never Smoker    Smokeless tobacco: Current User     Types: Chew    Alcohol use No    Drug use: No    Sexual activity: Not on file     Other Topics Concern    Not on file     Social History Narrative    No narrative on file     Review of Systems   Constitutional: Negative for activity change, appetite change, chills, fatigue and fever.   HENT: Negative for congestion, dental problem, mouth sores and sinus pressure.    Eyes: Negative for pain, redness and visual disturbance.   Respiratory: Negative for cough, shortness of breath and wheezing.    Cardiovascular: Negative for chest pain and leg swelling.   Gastrointestinal: Negative for abdominal distention, abdominal pain, diarrhea, nausea and vomiting.   Endocrine: Negative for polyuria.   Genitourinary: Negative for decreased urine volume, dysuria and frequency.   Musculoskeletal: Negative for joint swelling.   Skin: Positive for rash. Negative for color change.   Allergic/Immunologic: Negative for food allergies.   Neurological: Negative for dizziness, weakness and headaches.   Hematological: Negative for adenopathy.   Psychiatric/Behavioral: Negative for agitation and confusion. The patient is not nervous/anxious.      Objective:     Vital Signs (Most Recent):  Temp: 97.7 °F (36.5 °C) (06/27/18 1149)  Pulse: 88 (06/27/18 1149)  Resp: 17 (06/27/18 1149)  BP: (!) 156/67 (06/27/18 1149)  SpO2: 99 % (06/27/18 1149) Vital Signs (24h Range):  Temp:  [97.7 °F (36.5 °C)-98.8 °F (37.1 °C)] 97.7 °F (36.5 °C)  Pulse:  [] 88  Resp:  [17-18] 17  SpO2:  [97 %-100 %] 99 %  BP: (125-156)/(53-67) 156/67     Weight: (!) 139 kg (306 lb 7 oz)  Body mass index is 39.34 kg/m².    Estimated Creatinine Clearance: 56.8 mL/min (A) (based on SCr of 2 mg/dL  (H)).    Physical Exam   Constitutional: He is oriented to person, place, and time. He appears well-developed and well-nourished.   HENT:   Head: Normocephalic and atraumatic.   Mouth/Throat: Oropharynx is clear and moist.   Eyes: Conjunctivae are normal.   Neck: Neck supple.   Cardiovascular: Normal rate, regular rhythm and normal heart sounds.    No murmur heard.  Pulmonary/Chest: Effort normal and breath sounds normal. No respiratory distress. He has no wheezes.   Abdominal: Soft. Bowel sounds are normal. He exhibits no distension. There is no tenderness.   Musculoskeletal: Normal range of motion. He exhibits no edema or tenderness.   Lymphadenopathy:     He has no cervical adenopathy.   Neurological: He is alert and oriented to person, place, and time. Coordination normal.   Skin: Skin is warm and dry. No rash noted.   B/l LE chronic venous stasis changes, b/l LE peeling, flaky rash, inguinal erythema   Psychiatric: He has a normal mood and affect. His behavior is normal.       Significant Labs:   CBC:   Recent Labs  Lab 06/26/18  0428 06/27/18  0254   WBC 4.11 3.99   HGB 8.3* 8.3*   HCT 23.9* 24.6*   PLT 56* 60*     CMP:   Recent Labs  Lab 06/26/18  0428 06/26/18  1437 06/27/18  0254   * 147* 149*   K 4.4 4.3 3.8   * 119* 119*   CO2 20* 20* 20*   * 119* 102   BUN 25* 25* 23   CREATININE 2.1* 2.1* 2.0*   CALCIUM 9.6 9.6 9.8   PROT 5.2*  --  5.4*   ALBUMIN 2.9* 2.8* 3.0*   BILITOT 3.4*  --  2.8*   ALKPHOS 58  --  63   AST 26  --  33   ALT 24  --  26   ANIONGAP 10 8 10   EGFRNONAA 32.7* 32.7* 34.7*       Significant Imaging: I have reviewed all pertinent imaging results/findings within the past 24 hours.     6/25 ascitic fluid negative  6/25 2/4 CONS

## 2018-06-27 NOTE — ASSESSMENT & PLAN NOTE
IV furosemide, spironolactone PO and albumin infusion  -> repeat today 6/27  Pt with appearance of venous stasis with signif blistering/weeping  Consulted wound care  ABIs satisfactory

## 2018-06-27 NOTE — NURSING
Pt's son came to find this RN stating pt was having chest pain.  Upon entering pt's room, pt was sleeping.  Took pt's vitals, VSS.  IVY Kwok notified , stat chest xray, 12 lead ekg and labs ordered.  Will continue to monitor.

## 2018-06-27 NOTE — ASSESSMENT & PLAN NOTE
- Vanc started 6/25  - Initial + cx 6/25  - Repeat cx 6/27  - ID consulted 6/27 for tx course + any additional recs

## 2018-06-27 NOTE — ASSESSMENT & PLAN NOTE
- VIKCY initially improving with albumin infusion but with minimal intake 6/25 and 6/26  - Ur Na: 32, unlikely for HRS  - Albumin + lasix today

## 2018-06-27 NOTE — PLAN OF CARE
Problem: Physical Therapy Goal  Goal: Physical Therapy Goal  Goals to be met by: 18     Patient will increase functional independence with mobility by performin. Supine to sit with Stand-by Assistance  2. Sit to stand transfer with Stand-by Assistance  3. Gait  x 100 feet with Contact Guard Assistance using Rolling Walker.   4. Ascend/descend 5 stair with right Handrails Contact Guard Assistance.   5. Lower extremity exercise program x15 reps, with supervision, in order to increase LE strength and (I) with functional mobility.      Outcome: Ongoing (interventions implemented as appropriate)  Goals reviewed and remain appropriate. Pt progressing towards goals.    Lucille Craig, PT, DPT   2018  [phone]

## 2018-06-27 NOTE — SUBJECTIVE & OBJECTIVE
Scheduled Meds:   aspirin  81 mg Oral Daily    cefTRIAXone (ROCEPHIN) IVPB  1 g Intravenous Q24H    fluconazole  200 mg Oral Daily    heparin (porcine)  5,000 Units Subcutaneous Q8H    lactulose  30 g Oral Q6H    levETIRAcetam  500 mg Oral BID    pantoprazole  40 mg Oral BID    propranolol  10 mg Oral BID    rifAXImin  550 mg Oral BID     Continuous Infusions:  PRN Meds:acetaminophen, diphenhydrAMINE-zinc acetate 1-0.1%, lactulose, ondansetron, sodium chloride 0.9%    Review of Systems   Constitutional: Positive for activity change and appetite change. Negative for chills, diaphoresis, fatigue and fever.   Respiratory: Positive for shortness of breath (with minimal exertion). Negative for cough.    Cardiovascular: Positive for leg swelling. Negative for chest pain and palpitations.   Gastrointestinal: Positive for abdominal distention and diarrhea (with lactulose). Negative for abdominal pain, blood in stool, constipation, nausea and vomiting.   Genitourinary: Negative for decreased urine volume, dysuria and frequency.   Musculoskeletal: Negative for arthralgias.   Neurological: Positive for tremors and weakness. Negative for headaches.   Psychiatric/Behavioral: Positive for confusion.     Objective:     Vital Signs (Most Recent):  Temp: 97.7 °F (36.5 °C) (06/27/18 1149)  Pulse: 88 (06/27/18 1149)  Resp: 17 (06/27/18 1149)  BP: (!) 156/67 (06/27/18 1149)  SpO2: 99 % (06/27/18 1149) Vital Signs (24h Range):  Temp:  [97.7 °F (36.5 °C)-98.8 °F (37.1 °C)] 97.7 °F (36.5 °C)  Pulse:  [] 88  Resp:  [17-18] 17  SpO2:  [97 %-100 %] 99 %  BP: (125-156)/(53-67) 156/67     Weight: (!) 139 kg (306 lb 7 oz)  Body mass index is 39.34 kg/m².    Intake/Output - Last 3 Shifts       06/25 0700 - 06/26 0659 06/26 0700 - 06/27 0659 06/27 0700 - 06/28 0659    P.O. 0 1640 1900    IV Piggyback  200     Total Intake(mL/kg) 0 (0) 1840 (13.2) 1900 (13.7)    Urine (mL/kg/hr) 0 (0) 605 (0.2) 200 (0.2)    Emesis/NG output 100 (0)       Other 1000 (0.3)      Stool 0 (0)      Blood 0 (0)      Total Output 1100 605 200    Net -1100 +1235 +1700           Urine Occurrence 0 x 3 x     Stool Occurrence 2 x 4 x 1 x    Emesis Occurrence 0 x            Physical Exam   Constitutional: He is oriented to person, place, and time. No distress.   Temporal and distal extremity muscle wasting   HENT:   Head: Normocephalic and atraumatic.   Mouth/Throat: No oropharyngeal exudate.   Several broken teeth; poor dentition   Eyes: Scleral icterus is present.   Cardiovascular: Normal rate, regular rhythm and normal heart sounds.    Pulmonary/Chest: Effort normal. He has rales (BLL).   Abdominal: Soft. Bowel sounds are normal. He exhibits distension (ascites). There is no tenderness.   Musculoskeletal: He exhibits edema (2+ BLE).   Neurological: He is alert and oriented to person, place, and time.   + asterixis   Skin: He is not diaphoretic. There is erythema.   BLE crackling, scaly skin   Psychiatric:   + confusion though improved   Nursing note and vitals reviewed.      Laboratory:  Immunosuppressants     None        CBC:   Recent Labs  Lab 06/27/18 0254   WBC 3.99   RBC 2.47*   HGB 8.3*   HCT 24.6*   PLT 60*   *   MCH 33.6*   MCHC 33.7     CMP:   Recent Labs  Lab 06/27/18 0254      CALCIUM 9.8   ALBUMIN 3.0*   PROT 5.4*   *   K 3.8   CO2 20*   *   BUN 23   CREATININE 2.0*   ALKPHOS 63   ALT 26   AST 33   BILITOT 2.8*     Coagulation:   Recent Labs  Lab 06/27/18 0254   INR 2.1*     Labs within the past 24 hours have been reviewed.    Diagnostic Results:  I have personally reviewed all pertinent imaging studies.

## 2018-06-27 NOTE — CONSULTS
[facility]  Cardiology  Consult Note    Patient Name: Alon Adamson  MRN: [id number]  Admission Date: 6/22/2018  Hospital Length of Stay: 5 days  Code Status: Full Code   Attending Provider: Alma Joyce MD   Consulting Provider: Margaret Barragan MD  Primary Care Physician: Mckinley Vides MD  Principal Problem:Acute kidney injury superimposed on chronic kidney disease    Patient information was obtained from patient and ER records.     Inpatient consult to Cardiology  Consult performed by: MARGARET BARRAGAN  Consult ordered by: DARIUS ALCOCER  Reason for consult: chest pain        Subjective:     Chief Complaint:  Chest pain     HPI:   Pt is a 63 year old gentleman who we are consulted for chest pain.     He has ESLD and is currently admitted due to volume overload and increasing MELD. Also has a hx of hypertension, diabetes, CAD with PCI to dLAD and oPDA in 3/2017 as a result of a positive PET stress for Liver transplant eval. Last evening he complained of dull 4/10 substernal chest pain that started while laying in bed and was relieved on its own. Unsure how long it lasted but guessed about 30 minutes. He has never had chest pain before. EKG was done during episode and NSR no ST/T wave changes. Troponin was measured and flat at 0.14.         Past Medical History:   Diagnosis Date    Anticoagulant long-term use     Arthritis     Back pain     Cellulitis     Cirrhosis     Coronary artery disease     DM (diabetes mellitus)     Hearing loss     right ear    Hepatic encephalopathy     Hypertension     diagnosed today (11/25/2015) and prescribed toprol    Leg edema     Nephrolithiasis     JACK (obstructive sleep apnea)     Seizures     Splenomegaly        Past Surgical History:   Procedure Laterality Date    APPENDECTOMY      Open    BACK SURGERY      CHOLECYSTECTOMY      laprascopic    COLONOSCOPY N/A 1/25/2018    Procedure: COLONOSCOPY;  Surgeon: Lashawn Myles MD;  [facility] ([facility]);  Service: Endoscopy;  Laterality: N/A;    LUMBAR DISCECTOMY      SPLENIC ARTERY EMBOLIZATION  04/08/2016    Dr Taveras    TONSILLECTOMY         Review of patient's allergies indicates:   Allergen Reactions    Nsaids (non-steroidal anti-inflammatory drug)      D/t to liver disease.    Tylenol [acetaminophen]      D/t liver disease.    Penicillins Other (See Comments)     Tolerated pip-tazo in 8/2016 without issue  Since childhood was told not to take it       No current facility-administered medications on file prior to encounter.      Current Outpatient Prescriptions on File Prior to Encounter   Medication Sig    ascorbic acid (VITAMIN C) 1000 MG tablet Take 1,000 mg by mouth once daily.    aspirin (ECOTRIN) 81 MG EC tablet Take 1 tablet (81 mg total) by mouth once daily.    atorvastatin (LIPITOR) 40 MG tablet TAKE 1 TABLET(40 MG) BY MOUTH EVERY DAY    docusate sodium (COLACE) 100 MG capsule Take 100 mg by mouth once daily.     ferrous gluconate 270 mg (27 mg iron) Tab Take 1 tablet by mouth once daily.    fish oil-omega-3 fatty acids 300-1,000 mg capsule Take 2 capsules (2 g total) by mouth once daily. (Patient taking differently: Take 2 g by mouth 2 (two) times daily. )    furosemide (LASIX) 80 MG tablet Take 1 tablet (80 mg total) by mouth 2 (two) times daily.    GENERLAC 10 gram/15 mL solution Take 60 mLs (40 g total) by mouth 3 (three) times daily. 60mg three times per day (Patient taking differently: Take 60 mLs by mouth 3 (three) times daily. )    levETIRAcetam (KEPPRA) 500 MG Tab Take 1 tablet (500 mg total) by mouth 2 (two) times daily.    magnesium oxide (MAG-OX) 400 mg tablet Take 1 tablet (400 mg total) by mouth once daily.    ondansetron (ZOFRAN) 4 MG tablet Take 1 tablet (4 mg total) by mouth every 8 (eight) hours as needed for Nausea.    pantoprazole (PROTONIX) 40 MG tablet Take 1 tablet (40 mg total) by mouth 2 (two) times daily before meals.    rifAXIMin  (XIFAXAN) 550 mg Tab Take 1 tablet (550 mg total) by mouth 2 (two) times daily.    sodium bicarbonate 650 MG tablet Take 1 tablet (650 mg total) by mouth 2 (two) times daily.    spironolactone (ALDACTONE) 100 MG tablet Take 2 tablets (200 mg total) by mouth 2 (two) times daily.    tramadol (ULTRAM) 50 mg tablet Take 50 mg by mouth every 6 (six) hours as needed for Pain.     Family History     None        Social History Main Topics    Smoking status: Never Smoker    Smokeless tobacco: Current User     Types: Chew    Alcohol use No    Drug use: No    Sexual activity: Not on file     Review of Systems   All other systems reviewed and are negative.    Objective:     Vital Signs (Most Recent):  Temp: 98.3 °F (36.8 °C) (06/27/18 0428)  Pulse: 97 (06/27/18 0428)  Resp: 18 (06/27/18 0428)  BP: (!) 148/66 (06/27/18 0428)  SpO2: 97 % (06/27/18 0428) Vital Signs (24h Range):  Temp:  [98.2 °F (36.8 °C)-98.8 °F (37.1 °C)] 98.3 °F (36.8 °C)  Pulse:  [] 97  Resp:  [17-18] 18  SpO2:  [97 %-100 %] 97 %  BP: (125-148)/(58-73) 148/66     Weight: (!) 139 kg (306 lb 7 oz)  Body mass index is 39.34 kg/m².    SpO2: 97 %  O2 Device (Oxygen Therapy): room air      Intake/Output Summary (Last 24 hours) at 06/27/18 0836  Last data filed at 06/27/18 0600   Gross per 24 hour   Intake             1840 ml   Output              605 ml   Net             1235 ml       Lines/Drains/Airways     Peripheral Intravenous Line                 Peripheral IV - Single Lumen 06/22/18 1830 Right Upper Arm 4 days         Peripheral IV - Single Lumen 06/26/18 1000 Left Other less than 1 day                Physical Exam  GEN: Alert and oriented in NAD  NECK: + JVD appreciated   CVS: RRR, s1/s2, 2/6 systolic murmur   PULM: + rales  ABD: NT/ND BS +  Extremities: warm and dry, palpable pulses, mild edema  NEURO: Alert and oriented x 3  PSYCH: appropriate affect.         Significant Labs:   Recent Lab Results       06/27/18  0611 06/27/18  0540  06/27/18  0255 06/27/18  0254 06/26/18  1437      Immature Granulocytes    0.3      Immature Grans (Abs)    0.01  Comment:  Mild elevation in immature granulocytes is non specific and   can be seen in a variety of conditions including stress response,   acute inflammation, trauma and pregnancy. Correlation with other   laboratory and clinical findings is essential.        Albumin    3.0(L) 2.8(L)     Alkaline Phosphatase    63      ALT    26      Anion Gap    10 8     AST    33      Baso #    0.03      Basophil%    0.8      Total Bilirubin    2.8  Comment:  For infants and newborns, interpretation of results should be based  on gestational age, weight and in agreement with clinical  observations.  Premature Infant recommended reference ranges:  Up to 24 hours.............<8.0 mg/dL  Up to 48 hours............<12.0 mg/dL  3-5 days..................<15.0 mg/dL  6-29 days.................<15.0 mg/dL  (H)      BUN, Bld    23 25(H)     Calcium    9.8 9.6     Chloride    119(H) 119(H)     Cholesterol 53  Comment:  The National Cholesterol Education Program (NCEP) has set the  following guidelines (reference ranges) for Cholesterol:  Optimal.....................<200 mg/dL  Borderline High.............200-239 mg/dL  High........................> or = 240 mg/dL  (L)         CO2    20(L) 20(L)     CPK   58       CPK MB   1.5       Creatinine    2.0(H) 2.1(H)     Differential Method    Automated      eGFR if     40.2(A) 37.9(A)     eGFR if non     34.7  Comment:  Calculation used to obtain the estimated glomerular filtration  rate (eGFR) is the CKD-EPI equation.   (A) 32.7  Comment:  Calculation used to obtain the estimated glomerular filtration  rate (eGFR) is the CKD-EPI equation.   (A)     Eos #    0.3      Eosinophil%    7.0      Glucose    102 119(H)     Gran # (ANC)    2.1      Gran%    53.6      HDL 16  Comment:  The National Cholesterol Education Program (NCEP) has set the  following  "guidelines (reference values) for HDL Cholesterol:  Low...............<40 mg/dL  Optimal...........>60 mg/dL  (L)         HDL/Chol Ratio 30.2         Hematocrit    24.6(L)      Hemoglobin    8.3(L)      Coumadin Monitoring INR    2.1  Comment:  Coumadin Therapy:  2.0 - 3.0 for INR for all indicators except mechanical heart valves  and antiphospholipid syndromes which should use 2.5 - 3.5.  (H)      LDL Cholesterol 28.4  Comment:  The National Cholesterol Education Program (NCEP) has set the  following guidelines (reference values) for LDL Cholesterol:  Optimal.......................<130 mg/dL  Borderline High...............130-159 mg/dL  High..........................160-189 mg/dL  Very High.....................>190 mg/dL  (L)         Lymph #    0.8(L)      Lymph%    19.3      Magnesium    2.2      MB%   2.6  Comment:  To be positive, the MB% must be greater than 5% AND the CK-MB  greater than 6.5 ng/mL. Values not in the reference interval,   but not qualifying as positive, should be considered "trace".         MCH    33.6(H)      MCHC    33.7      MCV    100(H)      Mono #    0.8      Mono%    19.0(H)      MPV    11.5      Non-HDL Cholesterol 37  Comment:  Risk category and Non-HDL cholesterol goals:  Coronary heart disease (CHD)or equivalent (10-year risk of CHD >20%):  Non-HDL cholesterol goal     <130 mg/dL  Two or more CHD risk factors and 10-year risk of CHD <= 20%:  Non-HDL cholesterol goal     <160 mg/dL  0 to 1 CHD risk factor:  Non-HDL cholesterol goal     <190 mg/dL           nRBC    0      Phosphorus     3.7     Platelets    60(L)      Potassium    3.8 4.3     Total Protein    5.4(L)      Protime    20.4(H)      RBC    2.47(L)      RDW    18.1(H)      Sodium    149(H) 147(H)     Total Cholesterol/HDL Ratio 3.3         Triglycerides 43  Comment:  The National Cholesterol Education Program (NCEP) has set the  following guidelines (reference values) for triglycerides:  Normal......................<150 " mg/dL  Borderline High.............150-199 mg/dL  High........................200-499 mg/dL           Troponin I 0.140  Comment:  The reference interval for Troponin I represents the 99th percentile   cutoff   for our facility and is consistent with 3rd generation assay   performance.  (H)  0.138  Comment:  The reference interval for Troponin I represents the 99th percentile   cutoff   for our facility and is consistent with 3rd generation assay   performance.  (H)       Vancomycin-Trough  28.6(H)  32.2  Comment:  *Critical value -   Results called to and read back by:ADOLFO MCKEON RN  ()      WBC    3.99                      Significant Imaging: EKG: NSR no st t wave changes     Narrative    Date of Procedure: 01/26/2018    PRE-TEST DATA   EKG: Resting electrocardiogram reveals sinus rhythm at a rate of 69 bpm. There was low voltage.     TEST DESCRIPTION   The patient received a graduated infusion of Dobutamine beginning at 10.00 mcg/Kg/min to a peak dose of 30 mcg/Kg/min, achieving a peak heart rate of 141 bpm, which is 89% of the age predicted maximum heart rate. The patient also received a total of 0.5   mg of Atropine.     At peak infusion, EKG revealed < 1mm of upsloping ST segment depression in the inferolateral leads at a maximum heart rate of 141 bpm.     EKG Conclusions:    1. The EKG portion of this study is negative for ischemia at a peak heart rate of 141 bpm (89% of predicted).   2. Blood pressure remained stable throughout the protocol  (Presenting BP: 133/54 Peak BP: 102/39).   3. The following arrhythmias were present: rare PVCs.   4. There were no symptoms of chest discomfort or significant dyspnea throughout the protocol.     Echocardiographic Description:  Technical Quality: This is a technically good study. This study was performed in conjunction with a 3ml intravenous injection of Optison contrast agent.     Aorta: The aortic root is normal in size, measuring 3.1 cm at sinotubular  junction and 4.1 cm at Sinuses of Valsalva. The proximal ascending aorta is normal in size, measuring 3.4 cm across.     Left Atrium: The left atrial volume index is severely enlarged, measuring 49.98 cc/m2.     Left Ventricle: The left ventricle is normal in size, with an end-diastolic diameter of 4.5 cm, and an end-systolic diameter of 3.2 cm. LV wall thickness is normal, with the septum and the posterior wall each measuring 1.0 cm across. Relative wall   thickness was increased at 0.44, and the LV mass index was 66.1 g/m2 consistent with concentric remodeling. There are no regional wall motion abnormalities. Left ventricular systolic function appears normal. Visually estimated ejection fraction is   60-65%. The LV Doppler derived stroke volume equals 136.0 ccs.     Diastolic indices: E wave velocity 1.0 m/s, E/A ratio 1.0,  msec., E/e' ratio(avg) 9. Diastolic function is normal.     Right Atrium: The right atrium is normal in size, measuring 5.6 cm in length and 4.3 cm in width in the apical view.     Right Ventricle: The right ventricle is normal in size measuring 3.4 cm at the base in the apical right ventricle-focused view. Global right ventricular systolic function appears normal. Tricuspid annular plane systolic excursion (TAPSE) is 3.1 cm.   Tissue Doppler-derived tricuspid annular peak systolic velocity (S prime) is 20.9 cm/s.     Aortic Valve:  The aortic valve is normal in structure with normal leaflet mobility. The aortic valve is tri-leaflet in structure.     Mitral Valve:  The mitral valve is normal in structure with normal leaflet mobility.     Tricuspid Valve:  The tricuspid valve is normal in structure with normal leaflet mobility.     Intracavitary: There is no evidence of pericardial effusion, intracavity mass, thrombi, or vegetation.     Peak Imaging:    Images taken at peak infusion showed left ventricular function augmenting. Left ventricular end systolic volume decreases.     No  dobutamine induced wall motion abnormalities were identified.       CONCLUSIONS     1 - Normal left ventricular systolic function (EF 60-65%).     2 - No wall motion abnormalities.     3 - Concentric remodeling.     4 - Severe left atrial enlargement.     5 - Normal left ventricular diastolic function.     6 - Normal right ventricular systolic function .     No evidence of stress induced myocardial ischemia.       Date of Procedure: 02/01/2017    PRE-TEST DATA   EKG: EKG demonstrates adequate. Resting electrocardiogram reveals sinus rhythm at a rate of 76 bpm.     Indication for Stress Test:  Pre-liver transplant    Clinical Notes:  Diabetes and CAD    TEST DESCRIPTION   The patient received 60 mg of Dipyridamole over 4 minutes. Peak heart rate was 83 bpm, which is 52% of the age predicted maximum heart rate. .     EKG Conclusions:    1. The EKG portion of this study is negative for ischemia at a peak heart rate of 83 bpm (52% of predicted).   2. Blood pressure remained stable throughout the protocol  (Presenting BP: 132/57 Peak BP: 100/46).   3. No significant arrhythmias were present.   4. There were no symptoms of chest discomfort or significant dyspnea throughout the protocol.     Protocol:  After explaining the risk, benefits, and alternatives of IV Dipyridamole PET perfusion scintigraphy and after obtaining informed consent, the patient was positioned in the Positron Attrius. After aligning the sternal angle of Bobby with the laser defining   the upper edge of the field of view, positioning was confirmed with a 7 minute attenuation transmission scan. This was followed by a 60 mCi. Rubidium 82 injection at rest.  A 7 minute emission acquisition scan was performed 70 seconds after termination   of the infusion.     Subsequently, Dipyridamole pharmacologic stress testing was performed as described above. 3 minutes following the cessation of the Dipyridamole infusion, 60 mCi of Rubidium 82 was infused and a 7  minute emission acquisition scan performed 70 seconds   after termination of the infusion. This was followed by a 7 minute attenuation transmission scan. Subsequently, images were processed VLA, HLA and short axis views in addition to polar tomographic displays and pseudo topographic displays. The site of the   IV injection was the right AC.     Inspection of the transaxial images demonstrated significant left lateral patient motion in the camera between rest and stress acquisitions. This was compensated for with the computer assisted shift correction algorithm.     COMMENTS  This is a technically excellent study.   Relative Myocardial Perfusion Images: The relative PET images show the following:  On resting images, there are no significant perfusion defects. With stress, a mild intensity, very small sized perfusion defect developed in the inferoapical wall.     Other Findings:  The extracardiac distribution of radioactivity is normal. The left ventricular cavity is normal in size and does not increase with stress. On gated SPECT, left ventricular motion is normal at rest. On gated SPECT, left ventricular motion is normal at   stress.     Nuclear Quantitative Functional Analysis:   At rest:  LVEF: 57 % (normal is >= 51%)  LVED Volume: 138 ml (normal is <=171)  LVES Volume: 60 ml (normal is <=70)  At stress:  LVEF: 56 % (normal is >= 51%)  LVED Volume: 145 ml (normal is <=171)  LVES Volume: 64 ml (normal is <=70)    CONCLUSIONS: ABNORMAL MYOCARDIAL PERFUSION PET STRESS TEST  1. There is a very small sized mild ischemia in the inferoapical wall in the usual distribution of the Posterior Descending Artery. This defect comprises < 5 % of the left ventricular myocardium.   2. Resting wall motion is physiologic. Stress wall motion is physiologic.   3. LV function is normal at rest and stress.  (normal is >= 51%)  4. The ventricular volumes are normal at rest and stress.   5. The extracardiac distribution of radioactivity  is normal.   6. There was no previous study available to compare.    Date of Procedure:  03/16/2017    A. Indication/Pre-Operative Diagnosis     The patient is a 61 year old male that was referred for catheterization by Miriam García for abnormal CV function study and pre-op eval before non-cardiac surgery.     B. Summary/Post-Operative Diagnosis       Two vessel coronary artery disease.    Successful PCI.    C. HPI     I have reviewed the history and physical completed on 03/16/2017. The patient has been examined and I concur with the findings from 03/16/2017.     Patient history was obtained from the patient.     Counseling: The patient was counseled regarding the potential risks and benefits of this procedure, as well as possible alternative approaches to the problem and gave informed consent.    The ASA Score was Class II.     [facility] Risk Score for MACE is 1.20%. [facility] Risk Score for Death is 0.10%.     Height: 74 in.      Weight: 300 lbs.      BMI: 38.50 kg/m2    OUTPATIENT MEDICATIONS: Medications were reviewed.  ALLERGIES: Allergies were reviewed.    Laboratory data revealed:     02/14/2017 INR  1.3, PTI  13.6   03/16/2017 GLU  130, HCT  32.8, HGB  11.9, K  4.2, GRPE  A POS, NA  135, PLT  44, CREAT  1.2, WBCIR  4.91, BUN  18                D. Angiographic Results     Diagnostic:          Patient has a right dominant coronary artery.        - Left Main Coronary Artery:             The LM is normal. There is KALEB 3 flow.     - Left Anterior Descending Artery:             The distal LAD has a 75% stenosis. There is KALEB 3 flow.                     Lesion Details:   The length is 10mm. The minimum luminal diameter is 0.4 millimeters. The reference vessel diameter is 2.5 millimeters.     - Left Circumflex Artery:             The LCX is normal. There is KALEB 3 flow.     - Right Coronary Artery:             The RCA is normal. There is KALEB 3 flow.     - Posterior Descending Artery:             The ostial  PDA has a 75% stenosis. There is KALEB 3 flow.                     Lesion Details:   This is a Type C lesion.  The length is 10mm. Bifurcation is present. The minimum luminal diameter is 0.4 millimeters. The reference vessel diameter is 2.5 millimeters.      Intervention          Distal LAD:              The lesion was successfully intervened. Post-stenosis of 0%, post-KALEB 3 flow and TMP grade 3. The vessel was accessed natively.  The following items were used: 2.7H74I744 Euphora SC Balloon and Stent Integrity 2.5 X 14.       Ostial PDA:              The lesion was successfully intervened. Post-stenosis of 0%. The vessel was accessed natively.  The following items were used: 2.2X16T041 Euphora SC Balloon and Stent Integrity 2.5 X 14      Assessment and Plan:     Coronary artery disease involving native coronary artery of native heart without angina pectoris    Pt is a 63 year old gentleman who we are consulted for chest pain. He has ESLD and is currently admitted due to volume overload and increasing MELD. Also has a hx of hypertension, diabetes, CAD with PCI to dLAD and oPDA in 3/2017 as a result of a positive PET stress for Liver transplant eval. Last evening he complained of dull 4/10 substernal chest pain that started while laying in bed and was relieved on its own. Unsure how long it lasted but guessed about 30 minutes. He has never had chest pain before. EKG was done during episode and NSR no ST/T wave changes. Troponin was measured and flat at 0.14.     This is not ACS and Chest pain seems non cardiac in nature. Patient reports that is similar to his reflux pain. Also he  just had a negative stress test in January of this year. Elevated troponin is flat and can be explained by his CKD. No need for further work up at this time. Would start on asa 81 mg daily if ok with primary team. If ok with primary team would also start on BB for portal hypertension. Ideally would placed patient on a statin but with  decompensated cirrhosis now is not the appopriate time.               VTE Risk Mitigation         Ordered     heparin (porcine) injection 5,000 Units  Every 8 hours      06/22/18 1502     IP VTE HIGH RISK PATIENT  Once      06/22/18 1502          Thank you for your consult. I will sign off. Please contact us if you have any additional questions.    Margaret Barragan MD  Cardiology   [facility]

## 2018-06-27 NOTE — SUBJECTIVE & OBJECTIVE
Past Medical History:   Diagnosis Date    Anticoagulant long-term use     Arthritis     Back pain     Cellulitis     Cirrhosis     Coronary artery disease     DM (diabetes mellitus)     Hearing loss     right ear    Hepatic encephalopathy     Hypertension     diagnosed today (11/25/2015) and prescribed toprol    Leg edema     Nephrolithiasis     JACK (obstructive sleep apnea)     Seizures     Splenomegaly        Past Surgical History:   Procedure Laterality Date    APPENDECTOMY      Open    BACK SURGERY      CHOLECYSTECTOMY      laprascopic    COLONOSCOPY N/A 1/25/2018    Procedure: COLONOSCOPY;  Surgeon: Lashawn Myles MD;  Location: [address]);  Service: Endoscopy;  Laterality: N/A;    LUMBAR DISCECTOMY      SPLENIC ARTERY EMBOLIZATION  04/08/2016    Dr Taveras    TONSILLECTOMY         Review of patient's allergies indicates:   Allergen Reactions    Nsaids (non-steroidal anti-inflammatory drug)      D/t to liver disease.    Tylenol [acetaminophen]      D/t liver disease.    Penicillins Other (See Comments)     Tolerated pip-tazo in 8/2016 without issue  Since childhood was told not to take it       Medications:  Prescriptions Prior to Admission   Medication Sig    ascorbic acid (VITAMIN C) 1000 MG tablet Take 1,000 mg by mouth once daily.    aspirin (ECOTRIN) 81 MG EC tablet Take 1 tablet (81 mg total) by mouth once daily.    atorvastatin (LIPITOR) 40 MG tablet TAKE 1 TABLET(40 MG) BY MOUTH EVERY DAY    docusate sodium (COLACE) 100 MG capsule Take 100 mg by mouth once daily.     ferrous gluconate 270 mg (27 mg iron) Tab Take 1 tablet by mouth once daily.    fish oil-omega-3 fatty acids 300-1,000 mg capsule Take 2 capsules (2 g total) by mouth once daily. (Patient taking differently: Take 2 g by mouth 2 (two) times daily. )    furosemide (LASIX) 80 MG tablet Take 1 tablet (80 mg total) by mouth 2 (two) times daily.    GENERLAC 10 gram/15 mL solution Take 60 mLs (40 g total)  by mouth 3 (three) times daily. 60mg three times per day (Patient taking differently: Take 60 mLs by mouth 3 (three) times daily. )    levETIRAcetam (KEPPRA) 500 MG Tab Take 1 tablet (500 mg total) by mouth 2 (two) times daily.    magnesium oxide (MAG-OX) 400 mg tablet Take 1 tablet (400 mg total) by mouth once daily.    ondansetron (ZOFRAN) 4 MG tablet Take 1 tablet (4 mg total) by mouth every 8 (eight) hours as needed for Nausea.    pantoprazole (PROTONIX) 40 MG tablet Take 1 tablet (40 mg total) by mouth 2 (two) times daily before meals.    rifAXIMin (XIFAXAN) 550 mg Tab Take 1 tablet (550 mg total) by mouth 2 (two) times daily.    sodium bicarbonate 650 MG tablet Take 1 tablet (650 mg total) by mouth 2 (two) times daily.    spironolactone (ALDACTONE) 100 MG tablet Take 2 tablets (200 mg total) by mouth 2 (two) times daily.    tramadol (ULTRAM) 50 mg tablet Take 50 mg by mouth every 6 (six) hours as needed for Pain.     Antibiotics     Start     Stop Route Frequency Ordered    06/25/18 1500  cefTRIAXone injection 1 g      -- IV Every 24 hours (non-standard times) 06/25/18 1044    06/22/18 2100  rifAXIMin tablet 550 mg      -- Oral 2 times daily 06/22/18 1719        Antifungals     Start     Stop Route Frequency Ordered    06/27/18 1130  fluconazole tablet 200 mg      -- Oral Daily 06/27/18 1125        Antivirals     None           Immunization History   Administered Date(s) Administered    Hepatitis A, Adult 10/30/2015, 05/12/2016    Hepatitis B, Adult 10/30/2015, 11/25/2015, 05/12/2016, 11/22/2016       Family History     None        Social History     Social History    Marital status:      Spouse name: N/A    Number of children: N/A    Years of education: N/A     Social History Main Topics    Smoking status: Never Smoker    Smokeless tobacco: Current User     Types: Chew    Alcohol use No    Drug use: No    Sexual activity: Not on file     Other Topics Concern    Not on file     Social  History Narrative    No narrative on file     Review of Systems   Constitutional: Negative for activity change, appetite change, chills, fatigue and fever.   HENT: Negative for congestion, dental problem, mouth sores and sinus pressure.    Eyes: Negative for pain, redness and visual disturbance.   Respiratory: Negative for cough, shortness of breath and wheezing.    Cardiovascular: Negative for chest pain and leg swelling.   Gastrointestinal: Negative for abdominal distention, abdominal pain, diarrhea, nausea and vomiting.   Endocrine: Negative for polyuria.   Genitourinary: Negative for decreased urine volume, dysuria and frequency.   Musculoskeletal: Negative for joint swelling.   Skin: Positive for rash. Negative for color change.   Allergic/Immunologic: Negative for food allergies.   Neurological: Negative for dizziness, weakness and headaches.   Hematological: Negative for adenopathy.   Psychiatric/Behavioral: Negative for agitation and confusion. The patient is not nervous/anxious.      Objective:     Vital Signs (Most Recent):  Temp: 97.7 °F (36.5 °C) (06/27/18 1149)  Pulse: 88 (06/27/18 1149)  Resp: 17 (06/27/18 1149)  BP: (!) 156/67 (06/27/18 1149)  SpO2: 99 % (06/27/18 1149) Vital Signs (24h Range):  Temp:  [97.7 °F (36.5 °C)-98.8 °F (37.1 °C)] 97.7 °F (36.5 °C)  Pulse:  [] 88  Resp:  [17-18] 17  SpO2:  [97 %-100 %] 99 %  BP: (125-156)/(53-67) 156/67     Weight: (!) 139 kg (306 lb 7 oz)  Body mass index is 39.34 kg/m².    Estimated Creatinine Clearance: 56.8 mL/min (A) (based on SCr of 2 mg/dL (H)).    Physical Exam   Constitutional: He is oriented to person, place, and time. He appears well-developed and well-nourished.   HENT:   Head: Normocephalic and atraumatic.   Mouth/Throat: Oropharynx is clear and moist.   Eyes: Conjunctivae are normal.   Neck: Neck supple.   Cardiovascular: Normal rate, regular rhythm and normal heart sounds.    No murmur heard.  Pulmonary/Chest: Effort normal and breath  sounds normal. No respiratory distress. He has no wheezes.   Abdominal: Soft. Bowel sounds are normal. He exhibits no distension. There is no tenderness.   Musculoskeletal: Normal range of motion. He exhibits no edema or tenderness.   Lymphadenopathy:     He has no cervical adenopathy.   Neurological: He is alert and oriented to person, place, and time. Coordination normal.   Skin: Skin is warm and dry. No rash noted.   B/l LE chronic venous stasis changes, b/l LE peeling, flaky rash, inguinal erythema   Psychiatric: He has a normal mood and affect. His behavior is normal.       Significant Labs:   CBC:   Recent Labs  Lab 06/26/18  0428 06/27/18  0254   WBC 4.11 3.99   HGB 8.3* 8.3*   HCT 23.9* 24.6*   PLT 56* 60*     CMP:   Recent Labs  Lab 06/26/18  0428 06/26/18  1437 06/27/18  0254   * 147* 149*   K 4.4 4.3 3.8   * 119* 119*   CO2 20* 20* 20*   * 119* 102   BUN 25* 25* 23   CREATININE 2.1* 2.1* 2.0*   CALCIUM 9.6 9.6 9.8   PROT 5.2*  --  5.4*   ALBUMIN 2.9* 2.8* 3.0*   BILITOT 3.4*  --  2.8*   ALKPHOS 58  --  63   AST 26  --  33   ALT 24  --  26   ANIONGAP 10 8 10   EGFRNONAA 32.7* 32.7* 34.7*       Significant Imaging: I have reviewed all pertinent imaging results/findings within the past 24 hours.     6/25 ascitic fluid negative  6/25 2/4 CONS

## 2018-06-27 NOTE — HPI
Alon Adamson is a 63 y.o. male with a PMHx of ESLD secondary to EtOH cirrhosis (s/p liver transplant), CAD (s/p PCI to dLAD and oPDA in 3/2017), HTN, CKD3 and seizure disorder (on Keppra).  Pt was admitted to the hospital on 6/22/18 and has had prolonged stay prior to transplant, mostly due to hepatic encephalopathy. Patient is now s/p orthotopic liver transplant on 7/23.  He underwent intra-op CRRT.  Received 9 units PRBC, 2 FFP, 2 cryo, 2 platelets, 4.4 L cell saver. Brought to the ICU intubated and weaned off pressors.  Yesterday morning (7/25), pt was extubated and weaned of Precedex.    Today (7/26), pt was bradycardic on the monitor (HR down to 30s).  EKG showed possible junctional bradycardia.  Pt has remained bradycardic since with HR mostly in 40s.  He has been asymptomatic during this time, and without any episodes of hypotension.     Pt denies any chest pain/discomfort, SOB, nausea/vomiting, lightheadedness or diaphoresis.  Family at bedside states pt has been doing well since extubation with improvement in functional and mental status.    Cardiology has been consulted for management recommendations of possible bradyarrhythmia in postoperative setting.

## 2018-06-27 NOTE — ASSESSMENT & PLAN NOTE
Pt is a 63 year old gentleman who we are consulted for chest pain. He has ESLD and is currently admitted due to volume overload and increasing MELD. Also has a hx of hypertension, diabetes, CAD with PCI to dLAD and oPDA in 3/2017 as a result of a positive PET stress for Liver transplant eval. Last evening he complained of dull 4/10 substernal chest pain that started while laying in bed and was relieved on its own. Unsure how long it lasted but guessed about 30 minutes. He has never had chest pain before. EKG was done during episode and NSR no ST/T wave changes. Troponin was measured and flat at 0.14.     This is not ACS and Chest pain seems non cardiac in nature. Patient reports that is similar to his reflux pain. Also he  just had a negative stress test in January of this year. Elevated troponin is flat and can be explained by his CKD. No need for further work up at this time. Would start on asa 81 mg daily if ok with primary team. If ok with primary team would also start on BB for portal hypertension. Ideally would placed patient on a statin but with decompensated cirrhosis now is not the appopriate time.

## 2018-06-27 NOTE — ASSESSMENT & PLAN NOTE
Pt is a 63 year old gentleman who we are consulted for chest pain. He has ESLD and is currently admitted due to volume overload and increasing MELD. Also has a hx of hypertension, diabetes, CAD with PCI to dLAD and oPDA in 3/2017 as a result of a positive PET stress for Liver transplant eval. Last evening he complained of dull 4/10 substernal chest pain that started while laying in bed and was relieved on its own. Unsure how long it lasted but guessed about 30 minutes. He has never had chest pain before. EKG was done during episode and NSR no ST/T wave changes. Troponin was measured and flat at 0.14.     This is not ACS and Chest pain seems non cardiac in nature. Patient reports that is similar to his reflux pain. Also he  just had a negative stress test in January of this year. Elevated troponin is flat and can be explained by his CKD. No need for further work up at this time. Would start on asa 81 mg daily if ok with primary team. If ok with primary team would also start on BB for portal hypertension.

## 2018-06-27 NOTE — ASSESSMENT & PLAN NOTE
64 y/o M h/o ETOH cirrhosis (PSE, pHTN) listed for liver txp, CKD3, CAD recently admitted for worsening PSE  requiring intubation admitted from clinic with hypervolemia, VICKY and b/l LE erythema. Here he has been afebrile, with diagnostic paracentesis unremarkable, found to have CONS 2/4 blood culture bottles with repeat negative.  He has been on empiric vancomycin and ceftriaxone and b/l LE erythema has improved and overall (combined with other interventions) his mental status has improved.  He remains deconditioned  - can finish 7 day total course of vancomycin dosed by level with goal trough 10-15 to complete course for LE cellulitis as has already had nice improvement.  Suspect bcx CONS is contaminant however given LE cellulitis and confusion possibly truly pathogenic but will be treated with vancomycin  - discussed with team will sign off please call back if questions

## 2018-06-27 NOTE — PT/OT/SLP PROGRESS
Physical Therapy Treatment    Patient Name:  Alon Adamson   MRN:  [id number]    Recommendations:     Discharge Recommendations:  home health PT   Discharge Equipment Recommendations: none   Barriers to discharge: Inaccessible home (5 VANESSA)    Assessment:     Alon Adamson is a 63 y.o. male admitted with a medical diagnosis of Acute kidney injury superimposed on chronic kidney disease.  He presents with the following impairments/functional limitations:  weakness, gait instability, impaired endurance, impaired balance, impaired self care skills, impaired functional mobilty, impaired cardiopulmonary response to activity, impaired skin, edema, pain, decreased upper extremity function. Therapy session limited to seated therex this date. Pt able to actively participate in therex throughout session with cues needed throughout for correct exercise technique. Pt would continue to benefit from skilled acute PT in order to address current deficits and progress functional mobility.     Rehab Prognosis:  good; patient would benefit from acute skilled PT services to address these deficits and reach maximum level of function.      Recent Surgery: * No surgery found *      Plan:     During this hospitalization, patient to be seen 4 x/week to address the above listed problems via gait training, therapeutic activities, therapeutic exercises, neuromuscular re-education  · Plan of Care Expires:  07/23/18   Plan of Care Reviewed with: patient, family    Subjective     Communicated with RN prior to session.  Patient found UIC upon PT entry to room, agreeable to treatment.      Chief Complaint: R shoulder pain with activity   Pain/Comfort:  · Pain Rating 1:  (reported pain in R shoulder with activity )  · Pain Addressed 1: Cessation of Activity    Patients cultural, spiritual, Hindu conflicts given the current situation: none noted     Objective:     Patient found with: telemetry     General Precautions: Standard, fall    Orthopedic Precautions:N/A   Braces: N/A     Functional Mobility:  · Not performed this session. Pt found UIC. Further mobility deferred 2* decreased safety awareness. Pt participated in seated therex as described below.       AM-PAC 6 CLICK MOBILITY  Turning over in bed (including adjusting bedclothes, sheets and blankets)?: 3  Sitting down on and standing up from a chair with arms (e.g., wheelchair, bedside commode, etc.): 3  Moving from lying on back to sitting on the side of the bed?: 3  Moving to and from a bed to a chair (including a wheelchair)?: 3  Need to walk in hospital room?: 3  Climbing 3-5 steps with a railing?: 2  Basic Mobility Total Score: 17       Therapeutic Activities and Exercises:   Seated therex BLE x10 reps each:    -AP   -LAQ   -hip flex. Increased difficulty with hip flex RLE with pt only able to perform x6 reps. Max cues throughout for floor clearance and correct exercise technique     Seated therex BUE 10 reps:    -BUE shoulder flexion with hands together x5 reps; increased R shoulder pain, thus exercise terminated   -LUE chest press (unable to perform with RUE 2* shoulder pain). Max cues throughout for correct exercise technique   -LUE overhead shoulder press with AAROM (unable to perform with RUE 2* shoulder pain).    -BUE elbow flex/ext with max cues throughout for correct exercise technique     Pt's son present throughout session and attentive to therex. Pt's son agreeable to assist pt with exercises daily. Pt and son instructed to continue performing therex (I) daily.     Patient left up in chair with all lines intact, call button in reach and pt's family present..    GOALS:    Physical Therapy Goals        Problem: Physical Therapy Goal    Goal Priority Disciplines Outcome Goal Variances Interventions   Physical Therapy Goal     PT/OT, PT Ongoing (interventions implemented as appropriate)     Description:  Goals to be met by: 7/8/18     Patient will increase functional  independence with mobility by performin. Supine to sit with Stand-by Assistance  2. Sit to stand transfer with Stand-by Assistance  3. Gait  x 100 feet with Contact Guard Assistance using Rolling Walker.   4. Ascend/descend 5 stair with right Handrails Contact Guard Assistance.   5. Lower extremity exercise program x15 reps, with supervision, in order to increase LE strength and (I) with functional mobility.                       Time Tracking:     PT Received On: 18  PT Start Time: 1540     PT Stop Time: 1600  PT Total Time (min): 20 min     Billable Minutes: Therapeutic Exercise 20    Treatment Type: Treatment  PT/PTA: PT     PTA Visit Number: 0     Lucille Craig PT, DPT   2018  [phone]

## 2018-06-28 LAB
ABO + RH BLD: NORMAL
ALBUMIN SERPL BCP-MCNC: 2.7 G/DL
ALP SERPL-CCNC: 68 U/L
ALT SERPL W/O P-5'-P-CCNC: 26 U/L
ANION GAP SERPL CALC-SCNC: 8 MMOL/L
AST SERPL-CCNC: 37 U/L
BACTERIA BLD CULT: NORMAL
BACTERIA SPEC AEROBE CULT: NO GROWTH
BASOPHILS # BLD AUTO: 0.04 K/UL
BASOPHILS NFR BLD: 1.2 %
BILIRUB SERPL-MCNC: 2.1 MG/DL
BLD GP AB SCN CELLS X3 SERPL QL: NORMAL
BLD PROD TYP BPU: NORMAL
BLOOD UNIT EXPIRATION DATE: NORMAL
BLOOD UNIT TYPE CODE: 6200
BLOOD UNIT TYPE: NORMAL
BUN SERPL-MCNC: 18 MG/DL
CALCIUM SERPL-MCNC: 9 MG/DL
CHLORIDE SERPL-SCNC: 112 MMOL/L
CO2 SERPL-SCNC: 20 MMOL/L
CODING SYSTEM: NORMAL
CREAT SERPL-MCNC: 1.7 MG/DL
DIFFERENTIAL METHOD: ABNORMAL
DISPENSE STATUS: NORMAL
EOSINOPHIL # BLD AUTO: 0.4 K/UL
EOSINOPHIL NFR BLD: 11.9 %
ERYTHROCYTE [DISTWIDTH] IN BLOOD BY AUTOMATED COUNT: 17.8 %
EST. GFR  (AFRICAN AMERICAN): 48.9 ML/MIN/1.73 M^2
EST. GFR  (NON AFRICAN AMERICAN): 42.3 ML/MIN/1.73 M^2
GLUCOSE SERPL-MCNC: 130 MG/DL
HCT VFR BLD AUTO: 23.4 %
HGB BLD-MCNC: 7.8 G/DL
IMM GRANULOCYTES # BLD AUTO: 0.01 K/UL
IMM GRANULOCYTES NFR BLD AUTO: 0.3 %
INR PPP: 2
LYMPHOCYTES # BLD AUTO: 0.6 K/UL
LYMPHOCYTES NFR BLD: 18 %
MAGNESIUM SERPL-MCNC: 1.8 MG/DL
MCH RBC QN AUTO: 33.8 PG
MCHC RBC AUTO-ENTMCNC: 33.3 G/DL
MCV RBC AUTO: 101 FL
MONOCYTES # BLD AUTO: 0.6 K/UL
MONOCYTES NFR BLD: 16 %
NEUTROPHILS # BLD AUTO: 1.8 K/UL
NEUTROPHILS NFR BLD: 52.6 %
NRBC BLD-RTO: 0 /100 WBC
PLATELET # BLD AUTO: 51 K/UL
PMV BLD AUTO: 12 FL
POTASSIUM SERPL-SCNC: 3.8 MMOL/L
PROT SERPL-MCNC: 4.8 G/DL
PROTHROMBIN TIME: 19.8 SEC
RBC # BLD AUTO: 2.31 M/UL
SODIUM SERPL-SCNC: 140 MMOL/L
TRANS ERYTHROCYTES VOL PATIENT: NORMAL ML
VANCOMYCIN SERPL-MCNC: 20.3 UG/ML
VIT A SERPL-MCNC: <13 UG/DL (ref 38–106)
WBC # BLD AUTO: 3.44 K/UL

## 2018-06-28 PROCEDURE — 80202 ASSAY OF VANCOMYCIN: CPT | Mod: NTX

## 2018-06-28 PROCEDURE — 25000003 PHARM REV CODE 250: Mod: NTX | Performed by: NURSE PRACTITIONER

## 2018-06-28 PROCEDURE — 20600001 HC STEP DOWN PRIVATE ROOM: Mod: NTX

## 2018-06-28 PROCEDURE — 63600175 PHARM REV CODE 636 W HCPCS: Mod: NTX | Performed by: PHYSICIAN ASSISTANT

## 2018-06-28 PROCEDURE — 86920 COMPATIBILITY TEST SPIN: CPT | Mod: NTX

## 2018-06-28 PROCEDURE — 99233 SBSQ HOSP IP/OBS HIGH 50: CPT | Mod: NTX,,, | Performed by: PHYSICIAN ASSISTANT

## 2018-06-28 PROCEDURE — 36415 COLL VENOUS BLD VENIPUNCTURE: CPT | Mod: NTX

## 2018-06-28 PROCEDURE — 80053 COMPREHEN METABOLIC PANEL: CPT | Mod: NTX

## 2018-06-28 PROCEDURE — 85610 PROTHROMBIN TIME: CPT | Mod: NTX

## 2018-06-28 PROCEDURE — 83735 ASSAY OF MAGNESIUM: CPT | Mod: NTX

## 2018-06-28 PROCEDURE — 25000003 PHARM REV CODE 250: Mod: NTX | Performed by: PHYSICIAN ASSISTANT

## 2018-06-28 PROCEDURE — P9021 RED BLOOD CELLS UNIT: HCPCS | Mod: NTX

## 2018-06-28 PROCEDURE — 85025 COMPLETE CBC W/AUTO DIFF WBC: CPT | Mod: NTX

## 2018-06-28 PROCEDURE — 86901 BLOOD TYPING SEROLOGIC RH(D): CPT | Mod: NTX

## 2018-06-28 RX ORDER — HYDROCODONE BITARTRATE AND ACETAMINOPHEN 500; 5 MG/1; MG/1
TABLET ORAL
Status: DISCONTINUED | OUTPATIENT
Start: 2018-06-28 | End: 2018-06-30

## 2018-06-28 RX ORDER — OMEGA-3-ACID ETHYL ESTERS 1 G/1
2 CAPSULE, LIQUID FILLED ORAL
Status: DISCONTINUED | OUTPATIENT
Start: 2018-06-28 | End: 2018-06-28

## 2018-06-28 RX ORDER — ERGOCALCIFEROL 1.25 MG/1
50000 CAPSULE ORAL
Status: DISCONTINUED | OUTPATIENT
Start: 2018-06-28 | End: 2018-08-17 | Stop reason: HOSPADM

## 2018-06-28 RX ORDER — CHOLECALCIFEROL (VITAMIN D3) 25 MCG
2000 TABLET ORAL DAILY
Status: DISCONTINUED | OUTPATIENT
Start: 2018-06-28 | End: 2018-06-28

## 2018-06-28 RX ORDER — FUROSEMIDE 10 MG/ML
40 INJECTION INTRAMUSCULAR; INTRAVENOUS ONCE
Status: COMPLETED | OUTPATIENT
Start: 2018-06-28 | End: 2018-06-28

## 2018-06-28 RX ORDER — OMEGA-3-ACID ETHYL ESTERS 1 G/1
2 CAPSULE, LIQUID FILLED ORAL
Status: DISCONTINUED | OUTPATIENT
Start: 2018-06-29 | End: 2018-07-25

## 2018-06-28 RX ORDER — LACTULOSE 10 G/15ML
45 SOLUTION ORAL EVERY 6 HOURS
Status: DISCONTINUED | OUTPATIENT
Start: 2018-06-28 | End: 2018-07-02

## 2018-06-28 RX ADMIN — ONDANSETRON 8 MG: 8 TABLET, ORALLY DISINTEGRATING ORAL at 06:06

## 2018-06-28 RX ADMIN — PANCRELIPASE 2 CAPSULE: 60000; 12000; 38000 CAPSULE, DELAYED RELEASE PELLETS ORAL at 05:06

## 2018-06-28 RX ADMIN — OMEGA-3-ACID ETHYL ESTERS 2 G: 1 CAPSULE, LIQUID FILLED ORAL at 12:06

## 2018-06-28 RX ADMIN — LEVETIRACETAM 500 MG: 500 TABLET, FILM COATED ORAL at 08:06

## 2018-06-28 RX ADMIN — LACTULOSE 30 G: 20 SOLUTION ORAL at 06:06

## 2018-06-28 RX ADMIN — PANTOPRAZOLE SODIUM 40 MG: 40 TABLET, DELAYED RELEASE ORAL at 08:06

## 2018-06-28 RX ADMIN — RIFAXIMIN 550 MG: 550 TABLET ORAL at 08:06

## 2018-06-28 RX ADMIN — VITAMIN D, TAB 1000IU (100/BT) 2000 UNITS: 25 TAB at 12:06

## 2018-06-28 RX ADMIN — PANCRELIPASE 2 CAPSULE: 60000; 12000; 38000 CAPSULE, DELAYED RELEASE PELLETS ORAL at 12:06

## 2018-06-28 RX ADMIN — FUROSEMIDE 40 MG: 10 INJECTION, SOLUTION INTRAMUSCULAR; INTRAVENOUS at 03:06

## 2018-06-28 RX ADMIN — ERGOCALCIFEROL 50000 UNITS: 1.25 CAPSULE ORAL at 03:06

## 2018-06-28 RX ADMIN — ASPIRIN 81 MG: 81 TABLET, COATED ORAL at 08:06

## 2018-06-28 RX ADMIN — FLUCONAZOLE 200 MG: 200 TABLET ORAL at 08:06

## 2018-06-28 RX ADMIN — PROPRANOLOL HYDROCHLORIDE 10 MG: 10 TABLET ORAL at 08:06

## 2018-06-28 RX ADMIN — LACTULOSE 45 G: 20 SOLUTION ORAL at 12:06

## 2018-06-28 NOTE — PHYSICIAN QUERY
PT Name: Alon Adamson  MR #: [id number]     Physician Query Form - Documentation Clarification      CDS/: Laurie Nieves               Contact information: [email or web address]     This form is a permanent document in the medical record.     Query Date: June 28, 2018    By submitting this query, we are merely seeking further clarification of documentation. Please utilize your independent clinical judgment when addressing the question(s) below.    The Medical record reflects the following:    Supporting Clinical Findings Location in Medical Record   Pt with pulm edema noted - lasix + albumin resumed.    No acute intrathoracic process. Transplant PN 6/27    CXR 6/27                                                                                Doctor, Please specify diagnosis or diagnoses associated with above clinical findings.    Provider Use Only      (  ) Acute pulmonary edema     ( x ) acute on Chronic pulmonary edema     (  ) Other_______________                                                                                                         [  ] Clinically undetermined

## 2018-06-28 NOTE — ASSESSMENT & PLAN NOTE
Acute on chronic- grade 2 6/27  - improved from grade 3 with lactulose enemas 6/25 + 6/26  - continue rifaximin 550 mg BID  - infx workup initiated: blood/urine/ascites  - Continue abx: cefepime/vanc --> pt with G+C in blood, coag neg staph- likely contaminate   - but in setting of encephalopathy and ?LE cellulitis continue abx x7 days

## 2018-06-28 NOTE — PLAN OF CARE
Pt aaox3.  Bed in low and locked position.  Nonskid sock in use.  Mother and son at bedside and active in care  All aware to call if needing assistance via call bell. Avasys and bed alarm in use.   Appetite decreased.  Dietary consult placed, protein packets ordered with meals.   Vitamin, D, ergo, and fish oils started during md rounds.  1 PRBCs given followed by 40mg IVP lasix.  Ceftriaxone dc'd.  See flowsheet for full assessment and details.

## 2018-06-28 NOTE — SUBJECTIVE & OBJECTIVE
Scheduled Meds:   aspirin  81 mg Oral Daily    ergocalciferol  50,000 Units Oral Q7 Days    fluconazole  200 mg Oral Daily    furosemide  40 mg Intravenous Once    heparin (porcine)  5,000 Units Subcutaneous Q8H    lactulose  45 g Oral Q6H    levETIRAcetam  500 mg Oral BID    lipase-protease-amylase 12,000-38,000-60,000 units  2 capsule Oral TID WM    [START ON 6/29/2018] omega-3 acid ethyl esters  2 g Oral Daily with breakfast    pantoprazole  40 mg Oral BID    propranolol  10 mg Oral BID    rifAXImin  550 mg Oral BID     Continuous Infusions:  PRN Meds:sodium chloride, acetaminophen, diphenhydrAMINE-zinc acetate 1-0.1%, lactulose, ondansetron, sodium chloride 0.9%    Review of Systems   Constitutional: Positive for activity change and appetite change. Negative for chills, diaphoresis, fatigue and fever.   Respiratory: Positive for shortness of breath (with minimal exertion). Negative for cough.    Cardiovascular: Positive for leg swelling. Negative for chest pain and palpitations.   Gastrointestinal: Positive for abdominal distention and diarrhea (with lactulose). Negative for abdominal pain, blood in stool, constipation, nausea and vomiting.   Genitourinary: Negative for decreased urine volume, dysuria and frequency.   Musculoskeletal: Negative for arthralgias.   Neurological: Positive for tremors and weakness. Negative for headaches.   Psychiatric/Behavioral: Positive for confusion (improving with increased lactulose).     Objective:     Vital Signs (Most Recent):  Temp: 98.2 °F (36.8 °C) (06/28/18 1310)  Pulse: 66 (06/28/18 1310)  Resp: 16 (06/28/18 1310)  BP: (!) 114/56 (06/28/18 1310)  SpO2: 98 % (06/28/18 1310) Vital Signs (24h Range):  Temp:  [98 °F (36.7 °C)-99 °F (37.2 °C)] 98.2 °F (36.8 °C)  Pulse:  [66-88] 66  Resp:  [16-20] 16  SpO2:  [96 %-100 %] 98 %  BP: (110-144)/(56-72) 114/56     Weight: 136.1 kg (300 lb)  Body mass index is 38.52 kg/m².    Intake/Output - Last 3 Shifts       06/26  0700 - 06/27 0659 06/27 0700 - 06/28 0659 06/28 0700 - 06/29 0659    P.O. 1640 4920 940    IV Piggyback 200      Total Intake(mL/kg) 1840 (13.2) 4920 (36.1) 940 (6.9)    Urine (mL/kg/hr) 605 (0.2) 1100 (0.3) 200 (0.2)    Total Output 605 1100 200    Net +1235 +3820 +740           Urine Occurrence 3 x      Stool Occurrence 4 x 10 x           Physical Exam   Constitutional: He is oriented to person, place, and time. No distress.   Temporal and distal extremity muscle wasting   HENT:   Head: Normocephalic and atraumatic.   Mouth/Throat: No oropharyngeal exudate.   Several broken teeth; poor dentition   Eyes: Scleral icterus is present.   Cardiovascular: Normal rate, regular rhythm and normal heart sounds.    Pulmonary/Chest: Effort normal. He has rales (BLL).   Abdominal: Soft. Bowel sounds are normal. He exhibits distension (ascites). There is no tenderness.   Musculoskeletal: He exhibits edema (2+ BLE).   Neurological: He is alert and oriented to person, place, and time.   + asterixis   Skin: He is not diaphoretic. There is erythema.   BLE crackling, scaly skin   Psychiatric:   + confusion though improved   Nursing note and vitals reviewed.      Laboratory:  Immunosuppressants     None        CBC:   Recent Labs  Lab 06/28/18  0517   WBC 3.44*   RBC 2.31*   HGB 7.8*   HCT 23.4*   PLT 51*   *   MCH 33.8*   MCHC 33.3     CMP:   Recent Labs  Lab 06/28/18  0517   *   CALCIUM 9.0   ALBUMIN 2.7*   PROT 4.8*      K 3.8   CO2 20*   *   BUN 18   CREATININE 1.7*   ALKPHOS 68   ALT 26   AST 37   BILITOT 2.1*     Coagulation:   Recent Labs  Lab 06/28/18  0517   INR 2.0*     Labs within the past 24 hours have been reviewed.    Diagnostic Results:  pertinent imaging reviewed

## 2018-06-28 NOTE — ASSESSMENT & PLAN NOTE
- h/h slightly decreased, likely 2/2 albumin infusion yesterday  - plan for 1 unit PRBC today 6/28, will follow with IV lasix x1.

## 2018-06-28 NOTE — ASSESSMENT & PLAN NOTE
- Vanc started 6/25  - Initial + cx 6/25  - Repeat cx 6/27, NGTD  - ID consulted 6/27 for tx course + any additional recs  - pt with G+C in blood from 6/25, coag neg staph- likely contaminate   - but in setting of encephalopathy and ?LE cellulitis continue abx x7 days (through 7/1/18)

## 2018-06-28 NOTE — PROGRESS NOTES
[facility]  Liver Transplant  Progress Note    Patient Name: Alon Adamson  MRN: [id number]  Admission Date: 6/22/2018  Hospital Length of Stay: 6 days  Code Status: Full Code  Primary Care Provider: Mckinley Vides MD    Subjective:     History of Present Illness:  Mr. 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  Complications of liver disease include hyperbilirubinemia, hypoalbuminemia, severe malnutrition,   hepatic encephalopathy, thrombocytopenia, splenomegaly, ascites, hypervolemia, coagulopathy, portal HTN.  Pt recently admitted for severe hepatic encephalopathy requiring intubation but has been stable from mentation standpoint post resolution of acute enceophalopathy.      Mr. Adamson was seen in clinic 6/22 and was noted to have significant hypervolemia, weeping from BLE, and VICKY on CKD3 on routine labs.  His MELD increased from 22 to 26 per labs- coordinator notified and MELD updated.  Cr elevated to 2.3 from baseline of 1.4.  Also with worsening ascites.  Pt reported increased pruritis, generalized fatigue and weakness.  He presented in wheelchair.  ROS otherwise negative.      Pt started on IV diuretics 6/22 and continued 6/23 and 6/24.  BLE edema and ascites signif improved with diuresis and kidney function also improved.  Pt noted to have increased asterixis  6/24.  Upon admission, he was noted to have grade 1 hepatic encephalopathy which remained until 6/24 when grade 1-2 HE was noted.  Lactulose increased until pt had a BM- he had not had BM during previous day.  On 6/25 AM, pt with signif worsened encephalopathy opening eyes to verbal and tactile stimuli only.  Pt given lactulose enemas, infx workup sent, started on IV abx, hydrated with albumin, diuretics discontinued, CT head obtained and negative.  Paracentesis 6/25 negative for peritonitis per cell count and U/A negative. Pt also with concern for BLE cellulitis- upper legs bright red with lower  BLE still with dark appearance of venous stasis.  Placed on vancomycin with improvement in BLE redness 6/26.  Cr improved from 2/0 -> 1.9.  Pt began to improve but again returned to previous stated on 6/28.  Pt given more lactulose enemas and by 6/26 PM, was able to answer some orientation questions correctly which was a significant improvement.  Of note, taco placement attempted 6/26 but unable secondary to agitation upon insertion prompting self resolving nose bleed.  Micro lab called 6/26 PM with blood cx + for G+C in blood- pt continued on vanc which was started 6/25.   EEG obtained 6/26 as pt with h/o serizures and negative.  Pt continued on home Keppra.      Pt eating minimally during period of encephalopathy requiring gently hydration with IVF 6/26.  As previously stated, unable to place taco for nutrition.  VICKY initially improved with albumin + diuretics but increased again during period of encephalopathy.    Of note, with chest pain overnight 6/26-6/27 that resolved without intervention and pt reported as mild.  EKG NSR with PVC, troponin 0.1 in setting of hypervolemia + VICKY.  Normal dobutamine stress 1/2018.  Cardiology consulted and felt not ACS, no need for further workup other than continue to treat current risk factors for CAD with ASA (pt already on ASA and statin as outpt) + BB for portal hypertension which was started.      Hospital Course:  Interval hx:  Pt significantly improved mentation this AM- alert and oriented to person, place, and time and able to have conversation.  Still with G1-2 encephalopathy- + asterixis and some delayed responses.  Blood cx repeated 6/27 to assess for clearance, NGTD.  ID consulted to obtain tx recs, and staph likely contaminate- however in setting of LE cellulitis will continue vanc x7 days (through 7/1).  Pt with volume overloaded, responded well to albumin and lasix. H/h slightly decreased- will give blood and lasix today.  Still with VICKY on CKD 3, but slightly  improved.  Eating much better now that confusion improved.  Supplements ordered. Pre-hab supplements ordered. Consult dietician for calorie count. Will need to work with PT prior to discharge now that confusion improved to see outpt plan- deconditioned at admit and likely worse with 48 hours of severe encephalopathy.    Scheduled Meds:   aspirin  81 mg Oral Daily    ergocalciferol  50,000 Units Oral Q7 Days    fluconazole  200 mg Oral Daily    furosemide  40 mg Intravenous Once    heparin (porcine)  5,000 Units Subcutaneous Q8H    lactulose  45 g Oral Q6H    levETIRAcetam  500 mg Oral BID    lipase-protease-amylase 12,000-38,000-60,000 units  2 capsule Oral TID WM    [START ON 6/29/2018] omega-3 acid ethyl esters  2 g Oral Daily with breakfast    pantoprazole  40 mg Oral BID    propranolol  10 mg Oral BID    rifAXImin  550 mg Oral BID     Continuous Infusions:  PRN Meds:sodium chloride, acetaminophen, diphenhydrAMINE-zinc acetate 1-0.1%, lactulose, ondansetron, sodium chloride 0.9%    Review of Systems   Constitutional: Positive for activity change and appetite change. Negative for chills, diaphoresis, fatigue and fever.   Respiratory: Positive for shortness of breath (with minimal exertion). Negative for cough.    Cardiovascular: Positive for leg swelling. Negative for chest pain and palpitations.   Gastrointestinal: Positive for abdominal distention and diarrhea (with lactulose). Negative for abdominal pain, blood in stool, constipation, nausea and vomiting.   Genitourinary: Negative for decreased urine volume, dysuria and frequency.   Musculoskeletal: Negative for arthralgias.   Neurological: Positive for tremors and weakness. Negative for headaches.   Psychiatric/Behavioral: Positive for confusion (improving with increased lactulose).     Objective:     Vital Signs (Most Recent):  Temp: 98.2 °F (36.8 °C) (06/28/18 1310)  Pulse: 66 (06/28/18 1310)  Resp: 16 (06/28/18 1310)  BP: (!) 114/56 (06/28/18  1310)  SpO2: 98 % (06/28/18 1310) Vital Signs (24h Range):  Temp:  [98 °F (36.7 °C)-99 °F (37.2 °C)] 98.2 °F (36.8 °C)  Pulse:  [66-88] 66  Resp:  [16-20] 16  SpO2:  [96 %-100 %] 98 %  BP: (110-144)/(56-72) 114/56     Weight: 136.1 kg (300 lb)  Body mass index is 38.52 kg/m².    Intake/Output - Last 3 Shifts       06/26 0700 - 06/27 0659 06/27 0700 - 06/28 0659 06/28 0700 - 06/29 0659    P.O. 1640 4920 940    IV Piggyback 200      Total Intake(mL/kg) 1840 (13.2) 4920 (36.1) 940 (6.9)    Urine (mL/kg/hr) 605 (0.2) 1100 (0.3) 200 (0.2)    Total Output 605 1100 200    Net +1235 +3820 +740           Urine Occurrence 3 x      Stool Occurrence 4 x 10 x           Physical Exam   Constitutional: He is oriented to person, place, and time. No distress.   Temporal and distal extremity muscle wasting   HENT:   Head: Normocephalic and atraumatic.   Mouth/Throat: No oropharyngeal exudate.   Several broken teeth; poor dentition   Eyes: Scleral icterus is present.   Cardiovascular: Normal rate, regular rhythm and normal heart sounds.    Pulmonary/Chest: Effort normal. He has rales (BLL).   Abdominal: Soft. Bowel sounds are normal. He exhibits distension (ascites). There is no tenderness.   Musculoskeletal: He exhibits edema (2+ BLE).   Neurological: He is alert and oriented to person, place, and time.   + asterixis   Skin: He is not diaphoretic. There is erythema.   BLE crackling, scaly skin   Psychiatric:   + confusion though improved   Nursing note and vitals reviewed.      Laboratory:  Immunosuppressants     None        CBC:   Recent Labs  Lab 06/28/18  0517   WBC 3.44*   RBC 2.31*   HGB 7.8*   HCT 23.4*   PLT 51*   *   MCH 33.8*   MCHC 33.3     CMP:   Recent Labs  Lab 06/28/18  0517   *   CALCIUM 9.0   ALBUMIN 2.7*   PROT 4.8*      K 3.8   CO2 20*   *   BUN 18   CREATININE 1.7*   ALKPHOS 68   ALT 26   AST 37   BILITOT 2.1*     Coagulation:   Recent Labs  Lab 06/28/18  0517   INR 2.0*     Labs within  the past 24 hours have been reviewed.    Diagnostic Results:  pertinent imaging reviewed    Assessment/Plan:     * Acute kidney injury superimposed on chronic kidney disease    - VICKY initially improving with albumin infusion but with minimal intake 6/25 and 6/26  - Ur Na: 32, unlikely for HRS  - Creatinine slightly improved with albumin and lasix on 6/37, will give 1 unit PRBC and lasix today.           Hepatic encephalopathy    Acute on chronic- grade 2 6/27  - improved from grade 3 with lactulose enemas 6/25 + 6/26  - continue rifaximin 550 mg BID  - infx workup initiated: blood/urine/ascites  - Continue abx: cefepime/vanc --> pt with G+C in blood, coag neg staph- likely contaminate   - but in setting of encephalopathy and ?LE cellulitis continue abx x7 days          Bilateral edema of lower extremity    IV furosemide, spironolactone PO and albumin infusion  -> repeat today 6/27  Pt with appearance of venous stasis with signif blistering/weeping  Consulted wound care  ABIs satisfactory          Gram-positive bacteremia    - Vanc started 6/25  - Initial + cx 6/25  - Repeat cx 6/27, NGTD  - ID consulted 6/27 for tx course + any additional recs  - pt with G+C in blood from 6/25, coag neg staph- likely contaminate   - but in setting of encephalopathy and ?LE cellulitis continue abx x7 days (through 7/1/18)        Decompensated hepatic cirrhosis    Pt on hold for insurance issues and severe malnutrition/deconditioned status + currently bacteremic    MELD-Na score: 22 at 6/28/2018  5:17 AM  MELD score: 22 at 6/28/2018  5:17 AM  Calculated from:  Serum Creatinine: 1.7 mg/dL at 6/28/2018  5:17 AM  Serum Sodium: 140 mmol/L (Rounded to 137) at 6/28/2018  5:17 AM  Total Bilirubin: 2.1 mg/dL at 6/28/2018  5:17 AM  INR(ratio): 2 at 6/28/2018  5:17 AM  Age: 63 years            Hypoalbuminemia due to protein-calorie malnutrition    - Dietary consulted for calorie count  - Pt eating well today and boost supplement ordered  -  ""pre-hab" regimen and supplements ordered          Cellulitis of both lower extremities    - Cont vanc x7 days (through 7/1/18) --> erythema signif improved  - Monitor          Physical deconditioning    - PT consulted  - encourage OOBTC          Dermatitis associated with moisture              Ascites due to alcoholic cirrhosis    - Para 6/25 negative for infection          Coagulopathy    Secondary to decompensated liver disease          Cholestatic pruritus    - Hold Atarax for confusion          Coronary artery disease involving native coronary artery of native heart without angina pectoris    - Continue home ASA          Anemia of chronic disease    - h/h slightly decreased, likely 2/2 albumin infusion yesterday  - plan for 1 unit PRBC today 6/28, will follow with IV lasix x1.           Thrombocytopenia    Secondary to splenomegaly from portal HTN- should improve with txp  No s/s overt bleed          Seizures    - Oral Keppra transitioned to IV as pt not able to swallow 6/26  - Resumed oral Keppra since mentation improved  - EEG 6/26 negative            VTE Risk Mitigation         Ordered     heparin (porcine) injection 5,000 Units  Every 8 hours      06/22/18 1502     IP VTE HIGH RISK PATIENT  Once      06/22/18 1502          The patients clinical status was discussed at multidisplinary rounds, involving transplant surgery, transplant medicine, pharmacy, nursing, nutrition, and social work    Discharge Planning: not a canddiate for d/c at this time. Monitoring mental status, physical ability, nutrition.       Alexandrea Das PA-C  Liver Transplant  [facility]  "

## 2018-06-28 NOTE — ASSESSMENT & PLAN NOTE
Pt on hold for insurance issues and severe malnutrition/deconditioned status + currently bacteremic    MELD-Na score: 22 at 6/28/2018  5:17 AM  MELD score: 22 at 6/28/2018  5:17 AM  Calculated from:  Serum Creatinine: 1.7 mg/dL at 6/28/2018  5:17 AM  Serum Sodium: 140 mmol/L (Rounded to 137) at 6/28/2018  5:17 AM  Total Bilirubin: 2.1 mg/dL at 6/28/2018  5:17 AM  INR(ratio): 2 at 6/28/2018  5:17 AM  Age: 63 years

## 2018-06-28 NOTE — ASSESSMENT & PLAN NOTE
- VICKY initially improving with albumin infusion but with minimal intake 6/25 and 6/26  - Ur Na: 32, unlikely for HRS  - Creatinine slightly improved with albumin and lasix on 6/37, will give 1 unit PRBC and lasix today.

## 2018-06-28 NOTE — PROGRESS NOTES
Update:  SW met with pt alone in pt's room in order to provide continuity of care. No caregivers were at bedside this morning. Pt was oriented but appeared sleepy. Pt appeared to be coping okay but did not look well clinically. Pt reports that his wife will be coming to visit with him in the evening. SW expressed understanding and will reach out to family this morning to check in on how they are doing. SW remains available for continued psychosocial support, education, resources, and additional d/c planning as needed.    LEAH notified in LTS rounds by Dr. Moon that palliative care might be an appropriate resource for the pt given his chronic liver disease. Dr. Moon requested that SW contact the wife to speak with her about palliative care before a consult is placed. SW stated in rounds that she has not been able to get in touch with pt's wife Grindl and has already left a v/m regarding the status of pt's Part D coverage. SW stated that she would attempt to call again to reach out to pt's wife. SW remains available.    SW attempted a second time to reach out to pt's wife but she was not picking up. LEAH notified NP Myrtle Sellers that she was unable to reach pt's wife and will follow up with wife in person tomorrow to discuss palliative care as this may be more clinically appropriate. Myrtle expressed understanding. SW remains available.     SW was able to speak with pt's 84 y/o mother who is currently here with pt's 12 y/o son at the . Mother confirmed that pt's wife will be at [facility] later this evening. SW expressed understanding and will follow up tomorrow.

## 2018-06-28 NOTE — PLAN OF CARE
Problem: Patient Care Overview  Goal: Plan of Care Review  Outcome: Ongoing (interventions implemented as appropriate)  No acute events overnight. VSS. No complaints per pt.     AAO x 4. Lactulose HS dose held, ok per H. IVY Regalado. 3 BMs this shift; 14 over last 24 hours.     AVASYS monitoring in place. Bed alarm ON.     Sacrum, buttocks cleaned with soap and water and barrier cream applied PRN.      Fall precautions maintained. Bed wheels locked, bed in lowest position, upper SR up x 3, call light in reach, non-skid socks when OOB. Instructed pt to call for assistance as needed.

## 2018-06-28 NOTE — ASSESSMENT & PLAN NOTE
"- Dietary consulted for calorie count  - Pt eating well today and boost supplement ordered  - "pre-hab" regimen and supplements ordered    "

## 2018-06-29 LAB
ALBUMIN SERPL BCP-MCNC: 2.3 G/DL
ALP SERPL-CCNC: 70 U/L
ALT SERPL W/O P-5'-P-CCNC: 23 U/L
ANION GAP SERPL CALC-SCNC: 8 MMOL/L
AST SERPL-CCNC: 33 U/L
BASOPHILS # BLD AUTO: 0.03 K/UL
BASOPHILS NFR BLD: 1.1 %
BILIRUB SERPL-MCNC: 2.1 MG/DL
BUN SERPL-MCNC: 23 MG/DL
CALCIUM SERPL-MCNC: 8.3 MG/DL
CHLORIDE SERPL-SCNC: 111 MMOL/L
CO2 SERPL-SCNC: 17 MMOL/L
CREAT SERPL-MCNC: 1.6 MG/DL
DIFFERENTIAL METHOD: ABNORMAL
EOSINOPHIL # BLD AUTO: 0.3 K/UL
EOSINOPHIL NFR BLD: 11.7 %
ERYTHROCYTE [DISTWIDTH] IN BLOOD BY AUTOMATED COUNT: 17.2 %
EST. GFR  (AFRICAN AMERICAN): 52.6 ML/MIN/1.73 M^2
EST. GFR  (NON AFRICAN AMERICAN): 45.5 ML/MIN/1.73 M^2
GLUCOSE SERPL-MCNC: 136 MG/DL
HCT VFR BLD AUTO: 25 %
HGB BLD-MCNC: 8.6 G/DL
IMM GRANULOCYTES # BLD AUTO: 0.01 K/UL
IMM GRANULOCYTES NFR BLD AUTO: 0.4 %
INR PPP: 1.9
LYMPHOCYTES # BLD AUTO: 0.6 K/UL
LYMPHOCYTES NFR BLD: 20.1 %
MAGNESIUM SERPL-MCNC: 1.6 MG/DL
MCH RBC QN AUTO: 34 PG
MCHC RBC AUTO-ENTMCNC: 34.4 G/DL
MCV RBC AUTO: 99 FL
MONOCYTES # BLD AUTO: 0.5 K/UL
MONOCYTES NFR BLD: 17 %
NEUTROPHILS # BLD AUTO: 1.4 K/UL
NEUTROPHILS NFR BLD: 49.7 %
NRBC BLD-RTO: 0 /100 WBC
PLATELET # BLD AUTO: 44 K/UL
PMV BLD AUTO: 11.8 FL
POTASSIUM SERPL-SCNC: 4 MMOL/L
PROT SERPL-MCNC: 4.4 G/DL
PROTHROMBIN TIME: 18.8 SEC
RBC # BLD AUTO: 2.53 M/UL
SODIUM SERPL-SCNC: 136 MMOL/L
VANCOMYCIN SERPL-MCNC: 16 UG/ML
WBC # BLD AUTO: 2.83 K/UL

## 2018-06-29 PROCEDURE — 97535 SELF CARE MNGMENT TRAINING: CPT | Mod: NTX

## 2018-06-29 PROCEDURE — 97116 GAIT TRAINING THERAPY: CPT | Mod: NTX

## 2018-06-29 PROCEDURE — 25000003 PHARM REV CODE 250: Mod: NTX | Performed by: PHYSICIAN ASSISTANT

## 2018-06-29 PROCEDURE — 97165 OT EVAL LOW COMPLEX 30 MIN: CPT | Mod: NTX

## 2018-06-29 PROCEDURE — 63600175 PHARM REV CODE 636 W HCPCS: Mod: NTX | Performed by: PHYSICIAN ASSISTANT

## 2018-06-29 PROCEDURE — 25000003 PHARM REV CODE 250: Mod: NTX | Performed by: NURSE PRACTITIONER

## 2018-06-29 PROCEDURE — 99233 SBSQ HOSP IP/OBS HIGH 50: CPT | Mod: NTX,,, | Performed by: NURSE PRACTITIONER

## 2018-06-29 PROCEDURE — 80202 ASSAY OF VANCOMYCIN: CPT | Mod: NTX

## 2018-06-29 PROCEDURE — 20600001 HC STEP DOWN PRIVATE ROOM: Mod: NTX

## 2018-06-29 PROCEDURE — 85025 COMPLETE CBC W/AUTO DIFF WBC: CPT | Mod: NTX

## 2018-06-29 PROCEDURE — 80053 COMPREHEN METABOLIC PANEL: CPT | Mod: NTX

## 2018-06-29 PROCEDURE — 83735 ASSAY OF MAGNESIUM: CPT | Mod: NTX

## 2018-06-29 PROCEDURE — 85610 PROTHROMBIN TIME: CPT | Mod: NTX

## 2018-06-29 PROCEDURE — P9047 ALBUMIN (HUMAN), 25%, 50ML: HCPCS | Mod: JG,NTX | Performed by: NURSE PRACTITIONER

## 2018-06-29 PROCEDURE — 63600175 PHARM REV CODE 636 W HCPCS: Mod: NTX | Performed by: NURSE PRACTITIONER

## 2018-06-29 RX ORDER — FUROSEMIDE 10 MG/ML
40 INJECTION INTRAMUSCULAR; INTRAVENOUS 2 TIMES DAILY
Status: COMPLETED | OUTPATIENT
Start: 2018-06-29 | End: 2018-06-29

## 2018-06-29 RX ORDER — ALBUMIN HUMAN 250 G/1000ML
25 SOLUTION INTRAVENOUS EVERY 8 HOURS
Status: COMPLETED | OUTPATIENT
Start: 2018-06-29 | End: 2018-06-29

## 2018-06-29 RX ADMIN — FLUCONAZOLE 200 MG: 200 TABLET ORAL at 08:06

## 2018-06-29 RX ADMIN — ASPIRIN 81 MG: 81 TABLET, COATED ORAL at 08:06

## 2018-06-29 RX ADMIN — VANCOMYCIN HYDROCHLORIDE 1500 MG: 10 INJECTION, POWDER, LYOPHILIZED, FOR SOLUTION INTRAVENOUS at 12:06

## 2018-06-29 RX ADMIN — PANTOPRAZOLE SODIUM 40 MG: 40 TABLET, DELAYED RELEASE ORAL at 08:06

## 2018-06-29 RX ADMIN — LACTULOSE 45 G: 20 SOLUTION ORAL at 06:06

## 2018-06-29 RX ADMIN — ALBUMIN HUMAN 25 G: 0.25 SOLUTION INTRAVENOUS at 08:06

## 2018-06-29 RX ADMIN — LEVETIRACETAM 500 MG: 500 TABLET, FILM COATED ORAL at 08:06

## 2018-06-29 RX ADMIN — FUROSEMIDE 40 MG: 10 INJECTION, SOLUTION INTRAMUSCULAR; INTRAVENOUS at 08:06

## 2018-06-29 RX ADMIN — RIFAXIMIN 550 MG: 550 TABLET ORAL at 08:06

## 2018-06-29 RX ADMIN — HEPARIN SODIUM 5000 UNITS: 5000 INJECTION, SOLUTION INTRAVENOUS; SUBCUTANEOUS at 08:06

## 2018-06-29 RX ADMIN — PANCRELIPASE 2 CAPSULE: 60000; 12000; 38000 CAPSULE, DELAYED RELEASE PELLETS ORAL at 11:06

## 2018-06-29 RX ADMIN — PANCRELIPASE 2 CAPSULE: 60000; 12000; 38000 CAPSULE, DELAYED RELEASE PELLETS ORAL at 08:06

## 2018-06-29 RX ADMIN — OMEGA-3-ACID ETHYL ESTERS 2 G: 1 CAPSULE, LIQUID FILLED ORAL at 08:06

## 2018-06-29 RX ADMIN — FUROSEMIDE 40 MG: 10 INJECTION, SOLUTION INTRAMUSCULAR; INTRAVENOUS at 01:06

## 2018-06-29 RX ADMIN — PROPRANOLOL HYDROCHLORIDE 10 MG: 10 TABLET ORAL at 08:06

## 2018-06-29 RX ADMIN — PANCRELIPASE 2 CAPSULE: 60000; 12000; 38000 CAPSULE, DELAYED RELEASE PELLETS ORAL at 06:06

## 2018-06-29 RX ADMIN — LACTULOSE 45 G: 20 SOLUTION ORAL at 11:06

## 2018-06-29 RX ADMIN — ALBUMIN HUMAN 25 G: 0.25 SOLUTION INTRAVENOUS at 01:06

## 2018-06-29 NOTE — PLAN OF CARE
Problem: Physical Therapy Goal  Goal: Physical Therapy Goal  Goals to be met by: 18     Patient will increase functional independence with mobility by performin. Supine to sit with Stand-by Assistance  2. Sit to stand transfer with Stand-by Assistance - met   2a. Sit to stand with Mod-I  3. Gait  x 100 feet with Contact Guard Assistance using Rolling Walker. - met   3a. Gait x200 ft with Supervision with RW   4. Ascend/descend 5 stair with right Handrails Contact Guard Assistance.   5. Lower extremity exercise program x15 reps, with supervision, in order to increase LE strength and (I) with functional mobility.      Outcome: Ongoing (interventions implemented as appropriate)  Goals reviewed and remain appropriate. Pt progressing towards goals.    Lucille Craig, PT, DPT   2018  [phone]

## 2018-06-29 NOTE — PLAN OF CARE
Problem: Patient Care Overview  Goal: Plan of Care Review  Outcome: Ongoing (interventions implemented as appropriate)  No acute events overnight. VSS. No complaints per pt.      AAO x 4. Lactulose HS dose held. 2 BMs this shift; 6 in less than 24 hours.      AVASYS monitoring in place. Bed alarm ON.      Sacrum, buttocks cleaned with soap and water and barrier cream applied PRN.       Fall precautions maintained. Bed wheels locked, bed in lowest position, upper SR up x 3, call light in reach, non-skid socks when OOB. Instructed pt to call for assistance as needed.

## 2018-06-29 NOTE — PT/OT/SLP PROGRESS
Physical Therapy Treatment    Patient Name:  Alon Adamson   MRN:  [id number]    Recommendations:     Discharge Recommendations:  home health PT, home health OT   Discharge Equipment Recommendations: none   Barriers to discharge: Inaccessible home (stairs to enter)    Assessment:     Alon Adamson is a 63 y.o. male admitted with a medical diagnosis of Acute kidney injury superimposed on chronic kidney disease.  He presents with the following impairments/functional limitations:  weakness, impaired self care skills, impaired balance, impaired skin, edema, decreased upper extremity function, pain, decreased safety awareness, impaired endurance, impaired functional mobilty, gait instability. Notable improvement in functional mobility this date, as pt able to ambulate community distance with no SOB or LOB noted. Performing functional mobility with SBA. Pt would continue to benefit from skilled acute PT in order to address current deficits and progress functional mobility.     Rehab Prognosis:  good; patient would benefit from acute skilled PT services to address these deficits and reach maximum level of function.      Recent Surgery: * No surgery found *      Plan:     During this hospitalization, patient to be seen 4 x/week to address the above listed problems via gait training, therapeutic activities, therapeutic exercises, neuromuscular re-education  · Plan of Care Expires:  07/23/18   Plan of Care Reviewed with: patient, spouse, son, mother    Subjective     Communicated with RN prior to session.  Patient found seated EOB with OT upon PT entry to room, agreeable to treatment.      Chief Complaint: R shoulder pain with movement   Patient comments/goals: Pt highly motivated to ambulate down hallway to [place].   Pain/Comfort:  · Pain Rating 1:  (did not rate)  · Location - Side 1: Right  · Location 1: shoulder (with movement )  · Pain Addressed 1: Reposition, Distraction, Cessation of  Activity    Patients cultural, spiritual, Uatsdin conflicts given the current situation: none noted     Objective:     Patient found with: telemetry     General Precautions: Standard, fall   Orthopedic Precautions:N/A   Braces: N/A     Functional Mobility:  · Transfers:    · Sit to Stand:  stand by assistance with rolling walker  · Gait: ~250 ft. with SBA and RW; w/c follow throughout but no seated rest breaks needed  · Standing rest break x1  · Decreased antoine, decreased step length, impaired weight-shifting ability, decreased toe-floor clearance       AM-PAC 6 CLICK MOBILITY  Turning over in bed (including adjusting bedclothes, sheets and blankets)?: 3  Sitting down on and standing up from a chair with arms (e.g., wheelchair, bedside commode, etc.): 3  Moving from lying on back to sitting on the side of the bed?: 3  Moving to and from a bed to a chair (including a wheelchair)?: 3  Need to walk in hospital room?: 3  Climbing 3-5 steps with a railing?: 2  Basic Mobility Total Score: 17       Therapeutic Activities and Exercises:   Attempted to see pt earlier this AM, but pt reported need to have BM. Performed rolling L with CGA to place bed pan. Pt left on bed pan with call bell within reach.     PT/OT returned later in AM for full treatment session.  Performed functional mobility as described above.   Pt educated on safe mobility techniques and instructed that he must have staff assist for all standing tasks/transfers. Pt v/u.   Discussed d/c recs with pt and family, who report they prefer to d/c with HH instead of SNF if able.   CM and SW updated on d/c recs for HH.     Patient left sitting in w/c in waiting room with all lines intact, call button in reach, RN notified and pt's wife and son present..    GOALS:    Physical Therapy Goals        Problem: Physical Therapy Goal    Goal Priority Disciplines Outcome Goal Variances Interventions   Physical Therapy Goal     PT/OT, PT Ongoing (interventions implemented  as appropriate)     Description:  Goals to be met by: 18     Patient will increase functional independence with mobility by performin. Supine to sit with Stand-by Assistance  2. Sit to stand transfer with Stand-by Assistance - met   2a. Sit to stand with Mod-I  3. Gait  x 100 feet with Contact Guard Assistance using Rolling Walker. - met   3a. Gait x200 ft with Supervision with RW   4. Ascend/descend 5 stair with right Handrails Contact Guard Assistance.   5. Lower extremity exercise program x15 reps, with supervision, in order to increase LE strength and (I) with functional mobility.                        Time Tracking:     PT Received On: 18  PT Start Time: 1025     PT Stop Time: 1050  PT Total Time (min): 25 min     Billable Minutes: Gait Training 25   (co-tx with OT)    Treatment Type: Treatment  PT/PTA: PT     PTA Visit Number: 0     Lucille Craig, PT, DPT   2018  [phone]

## 2018-06-29 NOTE — ASSESSMENT & PLAN NOTE
- Pt on hold for insurance issues and severe malnutrition/deconditioned status + currently bacteremic.    MELD-Na score: 22 at 6/29/2018  5:07 AM  MELD score: 21 at 6/29/2018  5:07 AM  Calculated from:  Serum Creatinine: 1.6 mg/dL at 6/29/2018  5:07 AM  Serum Sodium: 136 mmol/L at 6/29/2018  5:07 AM  Total Bilirubin: 2.1 mg/dL at 6/29/2018  5:07 AM  INR(ratio): 1.9 at 6/29/2018  5:07 AM  Age: 63 years

## 2018-06-29 NOTE — PROGRESS NOTES
LEAH met with pt and pt's family in order to provide continuity of care and to discuss part D coverage. Please note that after speaking with Dr. Church, it was determined that palliative care discussion too early to be held at this time with the family given that pt is making a little bit of progress and was recently listed for a liver transplant. Pt was AAOx4, sitting up in his w/c. His mother Dary was combing his hair and pt was able to engage in some light conversation. Pt's son London and wife Erica were sitting near the window working on purchasing a Part D supplement during SW's visit. Pts' wife aware that pt will need this coverage as one of the components to being reactivated on the liver transplant list. Pt is also on hold due to debilitation. At this time, pt and family denied any further needs at this time. Pt and family aware that once pt discharged, he will be able to go out with HH. Per LTS, pt will not be discharged until early next week. LEAH remains available for continued psychosocial support, education, resources, and additional d/c planning as needed.    LEAH received an email confirmation from pt's son London with information that a Part D was purchased. Supplement is Silver Script and conf# is [id number]. Per son, this coverage should begin on July 1, which is this Sunday. LEAH forwarded information to transplant  Kristel Hernandez and notified RN coordinator Cecelia Beard. SW remains available.

## 2018-06-29 NOTE — PT/OT/SLP EVAL
Occupational Therapy   Evaluation    Name: Alon Adamson  MRN: [id number]  Admitting Diagnosis:  Acute kidney injury superimposed on chronic kidney disease      Recommendations:     Discharge Recommendations: home with home health  Discharge Equipment Recommendations:  none  Barriers to discharge:  None    History:     Occupational Profile:  Living Environment: Pt reports he lives with his wife and son in a mobility home with 5 VANESSA with R handrail present. Pt has a walk-ion shower with no DME present. Pt does not drive or work.   Previous level of function: PTA, pt requires assistance for ADLs. PTA, pt was mod (I) for functional mobility using RW for household mobility, SPC for community mobility, and w/c for longer distances. Pt has not been ambulating within the community for ~1 month.   Roles and Routines: , father  Equipment Owned:  wheelchair, cane, straight, bedside commode, hospital bed  Assistance upon Discharge: Pt will have assistance from wife and son upon d/c.     Past Medical History:   Diagnosis Date    Anticoagulant long-term use     Arthritis     Back pain     Cellulitis     Cirrhosis     Coronary artery disease     DM (diabetes mellitus)     Hearing loss     right ear    Hepatic encephalopathy     Hypertension     diagnosed today (11/25/2015) and prescribed toprol    Leg edema     Nephrolithiasis     JACK (obstructive sleep apnea)     Seizures     Splenomegaly        Past Surgical History:   Procedure Laterality Date    APPENDECTOMY      Open    BACK SURGERY      CHOLECYSTECTOMY      laprascopic    COLONOSCOPY N/A 1/25/2018    Procedure: COLONOSCOPY;  Surgeon: Lashawn Myles MD;  Location: [address];  Service: Endoscopy;  Laterality: N/A;    LUMBAR DISCECTOMY      SPLENIC ARTERY EMBOLIZATION  04/08/2016    Dr Taveras    TONSILLECTOMY         Subjective     Chief Complaint: itching skin   Patient/Family stated goals: to return home   Communicated with: RN  "prior to session. 1st initial attempt, pt found supine in bed with mother present. Pt requesting to get on bed pan. Pt assisted on to bed pan by therapist. OT returned for 2nd attempt for therapy evaluation and to assist pt off bed vasquez.     Pt stated, " I wanna go see the river."     Pain/Comfort:  · Pain Rating 1:  (R shoulder pain during activity )  · Location - Side 1: Right  · Location - Orientation 1: anterior  · Location 1: shoulder  · Pain Addressed 1: Cessation of Activity    Patients cultural, spiritual, Yazdanism conflicts given the current situation: none stated     Objective:     Patient found with: peripheral IV, telemetry    General Precautions: Standard, fall   Orthopedic Precautions:N/A   Braces: N/A     Occupational Performance:    Bed Mobility:    · Patient completed Rolling/Turning to Left with  stand by assistance and with side rail  · Patient completed Rolling/Turning to Right with stand by assistance and with side rail  · Patient completed Scooting/Bridging with stand by assistance for anterior scooting towards EOB   · Patient completed Supine to Sit with stand by assistance, with side rail and HOB flat    Functional Mobility/Transfers:  · Patient completed Sit <> Stand Transfer x 1 rep from EOB with stand by assistance  with  rolling walker   · Functional Mobility: Pt performed functional mobility simulating community level [facility] with [facility]GA initially progression to SBA throughout ambulation using RW and w/c to follow. Pt did not require seated rest break but took standing rest break to look out window for change of scenery. Pt expressed min c/o of B LE weakness during standing rest break.     Activities of Daily Living:  · UB Dressing: moderate assistance to doff gown in frotn and to don gown like robe sitting EOB   · LB Dressing: total assistance to don B  socks sitting EOB.   · Toileting: maximal assistance for placement of bed pan, total A for clothing management " and hygiene after voiding on bed pan.     Cognitive/Visual Perceptual:  Cognitive/Psychosocial Skills:     -       Oriented to: Person, Place, Time and Situation   -       Follows Commands/attention:Follows two-step commands and Follows multistep  commands  -       Communication: clear/fluent  -       Memory: slight impaired STM  -       Safety awareness/insight to disability: impaired   -       Mood/Affect/Coping skills/emotional control: Appropriate to situation  Visual/Perceptual:      -Intact    Physical Exam:  Balance:    - Static sit: SBA <>(S)  -  Dynamic sit: SBA  - Static standing: SBA <> CGA   - Dynamic Standing: SBA <>CGA    Postural examination/scapula alignment:    -       Rounded shoulders  Skin integrity: Dry and flaking skin with visible skin abrasions from scratching  Edema:  Moderate BUEs  Sensation:    -       Intact  light/touch BUEs  Dominant hand:    -       right  Upper Extremity Range of Motion:    -       Right Upper Extremity: WFL except impaired internal rotation, and shoudler abduction limited 2* R shoulder pain   -       Left Upper Extremity: WFL  Upper Extremity Strength:    -       Right Upper Extremity: WFL  -       Left Upper Extremity: WFL   Strength:    -       Right Upper Extremity: WFL  -       Left Upper Extremity: WFL  Fine Motor Coordination:    -       Intact  Left hand thumb/finger opposition skills and Right hand thumb/finger opposition skills  Gross motor coordination:   WFL    Patient left up in w/c with family present in TSU waiting area     [facility] 6 Click:  [facility] Total Score: 15    Treatment & Education:  Pt educated by therapist on:   - Pt educated on role of OT, POC, and goals for therapy.    - Daily orientation provided   - Educated pt on being appropriate to transfer with nsg and PCT x 1 person assist with use of RW.   - OT discussed d/c recs and DME needs prior to discharge.   - Time provided for therapeutic counseling and discussion of health disposition.   -  "Importance of OOB ax's with staff member assistance and sitting OOB majority of day.   - Pt completed ADLs and functional mobility for treatment session as noted above   - Pt verbalized understanding. Pt expressed no further concerns/questions.  - whiteboard updated   Education:    Assessment:     Alon Adamson is a 63 y.o. male with a medical diagnosis of Acute kidney injury superimposed on chronic kidney disease.  He presents with performance deficits affecting function are weakness, impaired endurance, impaired self care skills, impaired functional mobilty, gait instability, impaired balance, decreased coordination, edema, impaired cardiopulmonary response to activity, impaired skin. Pt overall required SBA  <>CGA for bed mobility, supine <>sit transfers, and functional mobility. Pt continues to demo slight limitations with mobility due to impaired endurance/strength. At baseline, pt required assistance for completion of ADL tasks. Pt has good family support and with the assist from family members pt is an appropriate candidate for  PT/OT upon d/c. Pt will continue to benefit from skilled acute OT address the previously stated deficits.     Rehab Prognosis:  good; patient would benefit from acute skilled OT services to address these deficits and reach maximum level of function.         Clinical Decision Makin.  OT Low:  "Pt evaluation falls under low complexity for evaluation coding due to performance deficits noted in 1-3 areas as stated above and 0 co-morbities affecting current functional status. Data obtained from problem focused assessments. No modifications or assistance was required for completion of evaluation. Only brief occupational profile and history review completed."     Plan:     Patient to be seen 3 x/week to address the above listed problems via therapeutic activities, therapeutic exercises, self-care/home management  · Plan of Care Expires: 18  · Plan of Care Reviewed with: " patient, spouse, mother, son    This Plan of care has been discussed with the patient who was involved in its development and understands and is in agreement with the identified goals and treatment plan    GOALS:    Occupational Therapy Goals        Problem: Occupational Therapy Goal    Goal Priority Disciplines Outcome Interventions   Occupational Therapy Goal     OT, PT/OT Ongoing (interventions implemented as appropriate)    Description:  Goals to be met by: 7/13/18     Patient will increase functional independence with ADLs by performing:    Grooming while standing at sink with Stand-by Assistance.  Toileting from toilet with Stand-by Assistance for hygiene and clothing management.   Toilet transfer to toilet with Stand-by Assistance.  Upper extremity exercise program x15 reps per handout, with assistance as needed.  Pt will engage in functional mobility to simulate household distances in order to maximize functional activity tolerance required for engagement in occupations of choice with SBA                           Time Tracking:     OT Date of Treatment: 06/22/18  OT Start Time: 1020  OT Stop Time: 1047  OT Total Time (min): 27 min    Billable Minutes:Evaluation 19  Self Care/Home Management 8    Abbey Barnard OT  6/29/2018

## 2018-06-29 NOTE — ASSESSMENT & PLAN NOTE
"- Dietary consulted for calorie count.  - Pt eating well today and boost supplement ordered.  - "pre-hab" regimen and supplements ordered.    "

## 2018-06-29 NOTE — SUBJECTIVE & OBJECTIVE
Scheduled Meds:   albumin human 25%  25 g Intravenous Q8H    aspirin  81 mg Oral Daily    ergocalciferol  50,000 Units Oral Q7 Days    fluconazole  200 mg Oral Daily    furosemide  40 mg Intravenous BID    heparin (porcine)  5,000 Units Subcutaneous Q8H    lactulose  45 g Oral Q6H    levETIRAcetam  500 mg Oral BID    lipase-protease-amylase 12,000-38,000-60,000 units  2 capsule Oral TID WM    omega-3 acid ethyl esters  2 g Oral Daily with breakfast    pantoprazole  40 mg Oral BID    propranolol  10 mg Oral BID    rifAXImin  550 mg Oral BID     Continuous Infusions:  PRN Meds:sodium chloride, acetaminophen, diphenhydrAMINE-zinc acetate 1-0.1%, lactulose, ondansetron, sodium chloride 0.9%    Review of Systems   Constitutional: Positive for activity change and appetite change. Negative for chills, diaphoresis, fatigue and fever.   HENT: Negative for congestion and facial swelling.    Eyes: Negative for pain, discharge and visual disturbance.   Respiratory: Positive for shortness of breath (with minimal exertion). Negative for cough, chest tightness and wheezing.    Cardiovascular: Positive for leg swelling. Negative for chest pain and palpitations.   Gastrointestinal: Positive for abdominal distention and diarrhea (with lactulose). Negative for abdominal pain, blood in stool, constipation, nausea and vomiting.   Endocrine: Negative.    Genitourinary: Negative for decreased urine volume, dysuria and frequency.   Musculoskeletal: Negative for arthralgias.   Skin: Negative for wound.   Allergic/Immunologic: Positive for immunocompromised state.   Neurological: Positive for tremors and weakness. Negative for headaches.   Hematological: Negative.    Psychiatric/Behavioral: Positive for confusion (improving with increased lactulose).     Objective:     Vital Signs (Most Recent):  Temp: 97.9 °F (36.6 °C) (06/29/18 1140)  Pulse: 61 (06/29/18 1140)  Resp: 17 (06/29/18 1140)  BP: (!) 120/59 (06/29/18 1140)  SpO2: 99  % (06/29/18 1140) Vital Signs (24h Range):  Temp:  [97.8 °F (36.6 °C)-98.8 °F (37.1 °C)] 97.9 °F (36.6 °C)  Pulse:  [61-73] 61  Resp:  [16-20] 17  SpO2:  [96 %-99 %] 99 %  BP: (101-120)/(50-59) 120/59     Weight: (!) 137 kg (302 lb 0.5 oz)  Body mass index is 38.78 kg/m².    Intake/Output - Last 3 Shifts       06/27 0700 - 06/28 0659 06/28 0700 - 06/29 0659 06/29 0700 - 06/30 0659    P.O. 4920 2640     Blood  350     Total Intake(mL/kg) 4920 (36.1) 2990 (21.8)     Urine (mL/kg/hr) 1100 (0.3) 1050 (0.3)     Total Output 1100 1050      Net +3820 +1940             Urine Occurrence  4 x     Stool Occurrence 10 x 4 x           Physical Exam   Constitutional: He is oriented to person, place, and time. No distress.   Temporal and distal extremity muscle wasting   HENT:   Head: Normocephalic and atraumatic.   Mouth/Throat: No oropharyngeal exudate.   Several broken teeth; poor dentition   Eyes: Scleral icterus is present.   Cardiovascular: Normal rate, regular rhythm and normal heart sounds.    Pulmonary/Chest: Effort normal. He has rales (BLL).   Abdominal: Soft. Bowel sounds are normal. He exhibits distension (ascites). There is no tenderness.   Musculoskeletal: He exhibits edema (2+ BLE).   Neurological: He is alert and oriented to person, place, and time.   + asterixis   Skin: He is not diaphoretic. There is erythema.   BLE crackling, scaly skin   Psychiatric:   + confusion though improved   Nursing note and vitals reviewed.      Laboratory:  Immunosuppressants     None        CBC:   Recent Labs  Lab 06/29/18  0507   WBC 2.83*   RBC 2.53*   HGB 8.6*   HCT 25.0*   PLT 44*   MCV 99*   MCH 34.0*   MCHC 34.4     CMP:   Recent Labs  Lab 06/29/18  0507   *   CALCIUM 8.3*   ALBUMIN 2.3*   PROT 4.4*      K 4.0   CO2 17*   *   BUN 23   CREATININE 1.6*   ALKPHOS 70   ALT 23   AST 33   BILITOT 2.1*     Labs within the past 24 hours have been reviewed.    Diagnostic Results:  I have personally reviewed all  pertinent imaging studies.

## 2018-06-29 NOTE — PROGRESS NOTES
[facility]  Liver Transplant  Progress Note    Patient Name: Alon Adamson  MRN: [id number]  Admission Date: 6/22/2018  Hospital Length of Stay: 7 days  Code Status: Full Code  Primary Care Provider: Mckinley Vides MD  Post-Operative Day:       Subjective:     History of Present Illness:  Mr. Adamson is a 64 yo M with PMH ESLD secondary to ETOH cirrhosis, CKD3, CAD.  Complications of liver disease include hyperbilirubinemia, hypoalbuminemia, severe malnutrition,   hepatic encephalopathy, thrombocytopenia, splenomegaly, ascites, hypervolemia, coagulopathy, portal HTN.  Pt recently admitted for severe hepatic encephalopathy requiring intubation but has been stable from mentation standpoint post resolution of acute enceophalopathy.      Mr. Adamson was seen in clinic 6/22 and was noted to have significant hypervolemia, weeping from BLE, and VICKY on CKD3 on routine labs.  His MELD increased from 22 to 26 per labs- coordinator notified and MELD updated.  Cr elevated to 2.3 from baseline of 1.4.  Also with worsening ascites.  Pt reported increased pruritis, generalized fatigue and weakness.  He presented in wheelchair.  ROS otherwise negative.      Pt started on IV diuretics 6/22 and continued 6/23 and 6/24.  BLE edema and ascites signif improved with diuresis and kidney function also improved.  Pt noted to have increased asterixis  6/24.  Upon admission, he was noted to have grade 1 hepatic encephalopathy which remained until 6/24 when grade 1-2 HE was noted.  Lactulose increased until pt had a BM- he had not had BM during previous day.  On 6/25 AM, pt with signif worsened encephalopathy opening eyes to verbal and tactile stimuli only.  Pt given lactulose enemas, infx workup sent, started on IV abx, hydrated with albumin, diuretics discontinued, CT head obtained and negative.  Paracentesis 6/25 negative for peritonitis per cell count and U/A negative. Pt also with concern for BLE cellulitis- upper legs  bright red with lower BLE still with dark appearance of venous stasis.  Placed on vancomycin with improvement in BLE redness 6/26.  Cr improved from 2/0 -> 1.9.  Pt began to improve but again returned to previous stated on 6/28.  Pt given more lactulose enemas and by 6/26 PM, was able to answer some orientation questions correctly which was a significant improvement.  Of note, taco placement attempted 6/26 but unable secondary to agitation upon insertion prompting self resolving nose bleed.  Micro lab called 6/26 PM with blood cx + for G+C in blood- pt continued on vanc which was started 6/25.   EEG obtained 6/26 as pt with h/o serizures and negative.  Pt continued on home Keppra.      Pt eating minimally during period of encephalopathy requiring gently hydration with IVF 6/26.  As previously stated, unable to place taco for nutrition.  VICKY initially improved with albumin + diuretics but increased again during period of encephalopathy.    Of note, with chest pain overnight 6/26-6/27 that resolved without intervention and pt reported as mild.  EKG NSR with PVC, troponin 0.1 in setting of hypervolemia + VICKY.  Normal dobutamine stress 1/2018.  Cardiology consulted and felt not ACS, no need for further workup other than continue to treat current risk factors for CAD with ASA (pt already on ASA and statin as outpt) + BB for portal hypertension which was started.      Hospital Course:  Interval hx: No acute events overnight. Mentation improved this AM - alert and oriented x 3 but still with delayed responses. Blood cultures with ngtd on 6/27. Continue IV vancomycin through 7/1 for LE cellulitis. H&H responded appropriately to blood transfusion yesterday. Will continue lasix and albumin x 2 doses today. VICKY slightly improved, Cr 1.6. Pre-hab supplements ordered. Pt eating well now that confusion improving. Calorie count ordered. Will check prealbumin in AM. Pt working well with PT.     Scheduled Meds:   albumin human 25%   25 g Intravenous Q8H    aspirin  81 mg Oral Daily    ergocalciferol  50,000 Units Oral Q7 Days    fluconazole  200 mg Oral Daily    furosemide  40 mg Intravenous BID    heparin (porcine)  5,000 Units Subcutaneous Q8H    lactulose  45 g Oral Q6H    levETIRAcetam  500 mg Oral BID    lipase-protease-amylase 12,000-38,000-60,000 units  2 capsule Oral TID WM    omega-3 acid ethyl esters  2 g Oral Daily with breakfast    pantoprazole  40 mg Oral BID    propranolol  10 mg Oral BID    rifAXImin  550 mg Oral BID     Continuous Infusions:  PRN Meds:sodium chloride, acetaminophen, diphenhydrAMINE-zinc acetate 1-0.1%, lactulose, ondansetron, sodium chloride 0.9%    Review of Systems   Constitutional: Positive for activity change and appetite change. Negative for chills, diaphoresis, fatigue and fever.   HENT: Negative for congestion and facial swelling.    Eyes: Negative for pain, discharge and visual disturbance.   Respiratory: Positive for shortness of breath (with minimal exertion). Negative for cough, chest tightness and wheezing.    Cardiovascular: Positive for leg swelling. Negative for chest pain and palpitations.   Gastrointestinal: Positive for abdominal distention and diarrhea (with lactulose). Negative for abdominal pain, blood in stool, constipation, nausea and vomiting.   Endocrine: Negative.    Genitourinary: Negative for decreased urine volume, dysuria and frequency.   Musculoskeletal: Negative for arthralgias.   Skin: Negative for wound.   Allergic/Immunologic: Positive for immunocompromised state.   Neurological: Positive for tremors and weakness. Negative for headaches.   Hematological: Negative.    Psychiatric/Behavioral: Positive for confusion (improving with increased lactulose).     Objective:     Vital Signs (Most Recent):  Temp: 97.9 °F (36.6 °C) (06/29/18 1140)  Pulse: 61 (06/29/18 1140)  Resp: 17 (06/29/18 1140)  BP: (!) 120/59 (06/29/18 1140)  SpO2: 99 % (06/29/18 1140) Vital Signs (24h  Range):  Temp:  [97.8 °F (36.6 °C)-98.8 °F (37.1 °C)] 97.9 °F (36.6 °C)  Pulse:  [61-73] 61  Resp:  [16-20] 17  SpO2:  [96 %-99 %] 99 %  BP: (101-120)/(50-59) 120/59     Weight: (!) 137 kg (302 lb 0.5 oz)  Body mass index is 38.78 kg/m².    Intake/Output - Last 3 Shifts       06/27 0700 - 06/28 0659 06/28 0700 - 06/29 0659 06/29 0700 - 06/30 0659    P.O. 4920 2640     Blood  350     Total Intake(mL/kg) 4920 (36.1) 2990 (21.8)     Urine (mL/kg/hr) 1100 (0.3) 1050 (0.3)     Total Output 1100 1050      Net +3820 +1940             Urine Occurrence  4 x     Stool Occurrence 10 x 4 x           Physical Exam   Constitutional: He is oriented to person, place, and time. No distress.   Temporal and distal extremity muscle wasting   HENT:   Head: Normocephalic and atraumatic.   Mouth/Throat: No oropharyngeal exudate.   Several broken teeth; poor dentition   Eyes: Scleral icterus is present.   Cardiovascular: Normal rate, regular rhythm and normal heart sounds.    Pulmonary/Chest: Effort normal. He has rales (BLL).   Abdominal: Soft. Bowel sounds are normal. He exhibits distension (ascites). There is no tenderness.   Musculoskeletal: He exhibits edema (2+ BLE).   Neurological: He is alert and oriented to person, place, and time.   + asterixis   Skin: He is not diaphoretic. There is erythema.   BLE crackling, scaly skin   Psychiatric:   + confusion though improved   Nursing note and vitals reviewed.      Laboratory:  Immunosuppressants     None        CBC:   Recent Labs  Lab 06/29/18  0507   WBC 2.83*   RBC 2.53*   HGB 8.6*   HCT 25.0*   PLT 44*   MCV 99*   MCH 34.0*   MCHC 34.4     CMP:   Recent Labs  Lab 06/29/18  0507   *   CALCIUM 8.3*   ALBUMIN 2.3*   PROT 4.4*      K 4.0   CO2 17*   *   BUN 23   CREATININE 1.6*   ALKPHOS 70   ALT 23   AST 33   BILITOT 2.1*     Labs within the past 24 hours have been reviewed.    Diagnostic Results:  I have personally reviewed all pertinent imaging  "studies.    Assessment/Plan:     * Acute kidney injury superimposed on chronic kidney disease    - VICKY initially improving with albumin infusion but with minimal intake 6/25 and 6/26.  - Ur Na: 32, unlikely for HRS.  - Creatinine slightly improved with albumin and lasix on 6/27.  - repeat lasix and albumin today.          Gram-positive bacteremia    - Vanc started 6/25  - Initial + cx 6/25  - Repeat cx 6/27, NGTD  - ID consulted 6/27 for tx course + any additional recs  - pt with G+C in blood from 6/25, coag neg staph- likely contaminate   - but in setting of encephalopathy and ?LE cellulitis continue abx x7 days (through 7/1/18)        Dermatitis associated with moisture              Cellulitis of both lower extremities    - Cont vanc x7 days (through 7/1/18) --> erythema signif improved.  - Monitor.          Physical deconditioning    - PT consulted.  - encourage OOBTC.          Ascites due to alcoholic cirrhosis    - Para 6/25 negative for infection.          Coagulopathy    - Secondary to decompensated liver disease.          Cholestatic pruritus    - Hold Atarax for confusion.          Hypoalbuminemia due to protein-calorie malnutrition    - Dietary consulted for calorie count.  - Pt eating well today and boost supplement ordered.  - "pre-hab" regimen and supplements ordered.          Decompensated hepatic cirrhosis    - Pt on hold for insurance issues and severe malnutrition/deconditioned status + currently bacteremic.    MELD-Na score: 22 at 6/29/2018  5:07 AM  MELD score: 21 at 6/29/2018  5:07 AM  Calculated from:  Serum Creatinine: 1.6 mg/dL at 6/29/2018  5:07 AM  Serum Sodium: 136 mmol/L at 6/29/2018  5:07 AM  Total Bilirubin: 2.1 mg/dL at 6/29/2018  5:07 AM  INR(ratio): 1.9 at 6/29/2018  5:07 AM  Age: 63 years            Coronary artery disease involving native coronary artery of native heart without angina pectoris    - Continue home ASA.          Anemia of chronic disease    - H&H improved today.  - 1 " unit PRBC transfused 6/28.           Thrombocytopenia    - Secondary to splenomegaly from portal HTN- should improve with txp.  - No s/s overt bleed.          Seizures    - Oral Keppra transitioned to IV as pt not able to swallow 6/26.  - Resumed oral Keppra since mentation improved.  - EEG 6/26 negative.        Bilateral edema of lower extremity    - IV furosemide, spironolactone PO and albumin infusion  -> repeat today 6/28.  - Pt with appearance of venous stasis with signif blistering/weeping.  - Consulted wound care.  - ABIs satisfactory.          Hepatic encephalopathy    - Acute on chronic- grade 2 6/27.  - improved from grade 3 with lactulose enemas 6/25 + 6/26.  - continue rifaximin 550 mg BID.  - infx workup initiated: blood/urine/ascites.  - Continue abx: cefepime/vanc --> pt with G+C in blood, coag neg staph- likely contaminate   - but in setting of encephalopathy and ?LE cellulitis continue abx x7 days              VTE Risk Mitigation         Ordered     heparin (porcine) injection 5,000 Units  Every 8 hours      06/22/18 1502     IP VTE HIGH RISK PATIENT  Once      06/22/18 1502          The patients clinical status was discussed at multidisplinary rounds, involving transplant surgery, transplant medicine, pharmacy, nursing, nutrition, and social work    Discharge Planning: not a candidate for dc at this time.       Daphne Day, IVY  Liver Transplant  [facility]

## 2018-06-29 NOTE — ASSESSMENT & PLAN NOTE
- Acute on chronic- grade 2 6/27.  - improved from grade 3 with lactulose enemas 6/25 + 6/26.  - continue rifaximin 550 mg BID.  - infx workup initiated: blood/urine/ascites.  - Continue abx: cefepime/vanc --> pt with G+C in blood, coag neg staph- likely contaminate   - but in setting of encephalopathy and ?LE cellulitis continue abx x7 days

## 2018-06-29 NOTE — ASSESSMENT & PLAN NOTE
- VICKY initially improving with albumin infusion but with minimal intake 6/25 and 6/26.  - Ur Na: 32, unlikely for HRS.  - Creatinine slightly improved with albumin and lasix on 6/27.  - repeat lasix and albumin today.

## 2018-06-29 NOTE — ASSESSMENT & PLAN NOTE
- Oral Keppra transitioned to IV as pt not able to swallow 6/26.  - Resumed oral Keppra since mentation improved.  - EEG 6/26 negative.

## 2018-06-29 NOTE — PLAN OF CARE
Problem: Occupational Therapy Goal  Goal: Occupational Therapy Goal  Goals to be met by: 7/13/18     Patient will increase functional independence with ADLs by performing:    Grooming while standing at sink with Stand-by Assistance.  Toileting from toilet with Stand-by Assistance for hygiene and clothing management.   Toilet transfer to toilet with Stand-by Assistance.  Upper extremity exercise program x15 reps per handout, with assistance as needed.  Pt will engage in functional mobility to simulate household distances in order to maximize functional activity tolerance required for engagement in occupations of choice with SBA         Outcome: Ongoing (interventions implemented as appropriate)  Initial  eval completed   POC implemented and goals established     Abbey Barnard, OTR/L  [phone]  6/29/2018

## 2018-06-29 NOTE — ASSESSMENT & PLAN NOTE
- IV furosemide, spironolactone PO and albumin infusion  -> repeat today 6/28.  - Pt with appearance of venous stasis with signif blistering/weeping.  - Consulted wound care.  - ABIs satisfactory.

## 2018-06-30 LAB
ALBUMIN SERPL BCP-MCNC: 2.8 G/DL
ALP SERPL-CCNC: 69 U/L
ALT SERPL W/O P-5'-P-CCNC: 24 U/L
ANION GAP SERPL CALC-SCNC: 7 MMOL/L
AST SERPL-CCNC: 31 U/L
BACTERIA BLD CULT: NORMAL
BASOPHILS # BLD AUTO: 0.02 K/UL
BASOPHILS NFR BLD: 0.7 %
BILIRUB SERPL-MCNC: 2.4 MG/DL
BUN SERPL-MCNC: 29 MG/DL
CALCIUM SERPL-MCNC: 8.5 MG/DL
CHLORIDE SERPL-SCNC: 109 MMOL/L
CO2 SERPL-SCNC: 18 MMOL/L
CREAT SERPL-MCNC: 1.7 MG/DL
DIFFERENTIAL METHOD: ABNORMAL
EOSINOPHIL # BLD AUTO: 0.4 K/UL
EOSINOPHIL NFR BLD: 13.9 %
ERYTHROCYTE [DISTWIDTH] IN BLOOD BY AUTOMATED COUNT: 17.2 %
EST. GFR  (AFRICAN AMERICAN): 48.9 ML/MIN/1.73 M^2
EST. GFR  (NON AFRICAN AMERICAN): 42.3 ML/MIN/1.73 M^2
GLUCOSE SERPL-MCNC: 122 MG/DL
HCT VFR BLD AUTO: 24.3 %
HGB BLD-MCNC: 8.6 G/DL
IMM GRANULOCYTES # BLD AUTO: 0.01 K/UL
IMM GRANULOCYTES NFR BLD AUTO: 0.4 %
INR PPP: 1.8
LYMPHOCYTES # BLD AUTO: 0.5 K/UL
LYMPHOCYTES NFR BLD: 18.6 %
MAGNESIUM SERPL-MCNC: 1.5 MG/DL
MCH RBC QN AUTO: 34.5 PG
MCHC RBC AUTO-ENTMCNC: 35.4 G/DL
MCV RBC AUTO: 98 FL
MONOCYTES # BLD AUTO: 0.5 K/UL
MONOCYTES NFR BLD: 19.3 %
NEUTROPHILS # BLD AUTO: 1.3 K/UL
NEUTROPHILS NFR BLD: 47.1 %
NRBC BLD-RTO: 0 /100 WBC
PLATELET # BLD AUTO: 40 K/UL
PMV BLD AUTO: 13.1 FL
POTASSIUM SERPL-SCNC: 3.9 MMOL/L
PREALB SERPL-MCNC: 4 MG/DL
PROT SERPL-MCNC: 4.7 G/DL
PROTHROMBIN TIME: 17.7 SEC
RBC # BLD AUTO: 2.49 M/UL
SODIUM SERPL-SCNC: 134 MMOL/L
VANCOMYCIN SERPL-MCNC: 21.7 UG/ML
WBC # BLD AUTO: 2.8 K/UL

## 2018-06-30 PROCEDURE — 36415 COLL VENOUS BLD VENIPUNCTURE: CPT | Mod: NTX

## 2018-06-30 PROCEDURE — 80202 ASSAY OF VANCOMYCIN: CPT | Mod: NTX

## 2018-06-30 PROCEDURE — 80053 COMPREHEN METABOLIC PANEL: CPT | Mod: NTX

## 2018-06-30 PROCEDURE — 84134 ASSAY OF PREALBUMIN: CPT | Mod: NTX

## 2018-06-30 PROCEDURE — 99233 SBSQ HOSP IP/OBS HIGH 50: CPT | Mod: NTX,,, | Performed by: INTERNAL MEDICINE

## 2018-06-30 PROCEDURE — 25000003 PHARM REV CODE 250: Mod: NTX | Performed by: NURSE PRACTITIONER

## 2018-06-30 PROCEDURE — 85610 PROTHROMBIN TIME: CPT | Mod: NTX

## 2018-06-30 PROCEDURE — 20600001 HC STEP DOWN PRIVATE ROOM: Mod: NTX

## 2018-06-30 PROCEDURE — 25000003 PHARM REV CODE 250: Mod: NTX | Performed by: PHYSICIAN ASSISTANT

## 2018-06-30 PROCEDURE — 25000003 PHARM REV CODE 250: Mod: NTX | Performed by: INTERNAL MEDICINE

## 2018-06-30 PROCEDURE — 63600175 PHARM REV CODE 636 W HCPCS: Mod: NTX | Performed by: PHYSICIAN ASSISTANT

## 2018-06-30 PROCEDURE — 83735 ASSAY OF MAGNESIUM: CPT | Mod: NTX

## 2018-06-30 PROCEDURE — 85025 COMPLETE CBC W/AUTO DIFF WBC: CPT | Mod: NTX

## 2018-06-30 RX ORDER — LIDOCAINE 50 MG/G
2 PATCH TOPICAL
Status: DISCONTINUED | OUTPATIENT
Start: 2018-06-30 | End: 2018-07-25

## 2018-06-30 RX ORDER — FUROSEMIDE 40 MG/1
40 TABLET ORAL DAILY
Status: DISCONTINUED | OUTPATIENT
Start: 2018-06-30 | End: 2018-07-01

## 2018-06-30 RX ORDER — CHOLESTYRAMINE 4 G/9G
1 POWDER, FOR SUSPENSION ORAL 2 TIMES DAILY
Status: DISCONTINUED | OUTPATIENT
Start: 2018-06-30 | End: 2018-07-01

## 2018-06-30 RX ADMIN — LACTULOSE 45 G: 20 SOLUTION ORAL at 04:06

## 2018-06-30 RX ADMIN — HEPARIN SODIUM 5000 UNITS: 5000 INJECTION, SOLUTION INTRAVENOUS; SUBCUTANEOUS at 02:06

## 2018-06-30 RX ADMIN — PANTOPRAZOLE SODIUM 40 MG: 40 TABLET, DELAYED RELEASE ORAL at 08:06

## 2018-06-30 RX ADMIN — PANCRELIPASE 2 CAPSULE: 60000; 12000; 38000 CAPSULE, DELAYED RELEASE PELLETS ORAL at 08:06

## 2018-06-30 RX ADMIN — FUROSEMIDE 40 MG: 40 TABLET ORAL at 08:06

## 2018-06-30 RX ADMIN — PROPRANOLOL HYDROCHLORIDE 10 MG: 10 TABLET ORAL at 08:06

## 2018-06-30 RX ADMIN — LACTULOSE 45 G: 20 SOLUTION ORAL at 06:06

## 2018-06-30 RX ADMIN — LEVETIRACETAM 500 MG: 500 TABLET, FILM COATED ORAL at 08:06

## 2018-06-30 RX ADMIN — HEPARIN SODIUM 5000 UNITS: 5000 INJECTION, SOLUTION INTRAVENOUS; SUBCUTANEOUS at 06:06

## 2018-06-30 RX ADMIN — LACTULOSE 45 G: 20 SOLUTION ORAL at 11:06

## 2018-06-30 RX ADMIN — RIFAXIMIN 550 MG: 550 TABLET ORAL at 08:06

## 2018-06-30 RX ADMIN — PANCRELIPASE 2 CAPSULE: 60000; 12000; 38000 CAPSULE, DELAYED RELEASE PELLETS ORAL at 04:06

## 2018-06-30 RX ADMIN — LACTULOSE 45 G: 20 SOLUTION ORAL at 08:06

## 2018-06-30 RX ADMIN — HEPARIN SODIUM 5000 UNITS: 5000 INJECTION, SOLUTION INTRAVENOUS; SUBCUTANEOUS at 08:06

## 2018-06-30 RX ADMIN — PANCRELIPASE 2 CAPSULE: 60000; 12000; 38000 CAPSULE, DELAYED RELEASE PELLETS ORAL at 11:06

## 2018-06-30 RX ADMIN — FLUCONAZOLE 200 MG: 200 TABLET ORAL at 08:06

## 2018-06-30 RX ADMIN — LIDOCAINE 2 PATCH: 50 PATCH TOPICAL at 10:06

## 2018-06-30 RX ADMIN — ASPIRIN 81 MG: 81 TABLET, COATED ORAL at 08:06

## 2018-06-30 RX ADMIN — OMEGA-3-ACID ETHYL ESTERS 2 G: 1 CAPSULE, LIQUID FILLED ORAL at 08:06

## 2018-06-30 NOTE — CONSULTS
RD following pt. Last assessment completed 6/27. Will follow up as scheduled and monitor outcome of calorie count. Recommendations copied below.      Recommendations     Recommendation/Intervention:   Pt now with increased alertness and intake.   Consider liberbalizing diet by taking off diabetic restriction. Continue Novasource Renal ONS.      Pt does not meet criteria for malnutrition at this time. Noted temple wasting however pt's weight stable x 1 year (noted edema), no other wasting throughout clavicle region or scapula, hands and feet. Pt historically with adequate po intake during previous admissions.     Pt at risk for malnutrition if po declines and remains suboptimal.   If po intake declines and NG placed, recommend noctural feeds to supplement intake.   Isosource 1.5 @ goal rate 70mL/hr x 12 hours nightly to provide 1260kcals, 57g protein and 642mL free water.      RD to monitor.

## 2018-06-30 NOTE — PLAN OF CARE
Problem: Patient Care Overview  Goal: Plan of Care Review  Outcome: Ongoing (interventions implemented as appropriate)  Patient has been aao x4. Call bell within reach. Family at bedside and attentive to pt's needs. He is up with standby assist. Up to chair for several hours today. Eating 100% of meals plus nepro and beneoprotein. Waffle mattress placed on bed today. Pt able to turn self independently. Will continue to monitor.

## 2018-07-01 LAB
ALBUMIN SERPL BCP-MCNC: 2.5 G/DL
ALP SERPL-CCNC: 70 U/L
ALT SERPL W/O P-5'-P-CCNC: 24 U/L
ANION GAP SERPL CALC-SCNC: 7 MMOL/L
AST SERPL-CCNC: 36 U/L
BASOPHILS # BLD AUTO: 0.02 K/UL
BASOPHILS NFR BLD: 0.6 %
BILIRUB SERPL-MCNC: 2.1 MG/DL
BUN SERPL-MCNC: 33 MG/DL
CALCIUM SERPL-MCNC: 8.3 MG/DL
CHLORIDE SERPL-SCNC: 108 MMOL/L
CO2 SERPL-SCNC: 17 MMOL/L
CREAT SERPL-MCNC: 1.9 MG/DL
DIFFERENTIAL METHOD: ABNORMAL
EOSINOPHIL # BLD AUTO: 0.5 K/UL
EOSINOPHIL NFR BLD: 14.2 %
ERYTHROCYTE [DISTWIDTH] IN BLOOD BY AUTOMATED COUNT: 17.4 %
EST. GFR  (AFRICAN AMERICAN): 42.7 ML/MIN/1.73 M^2
EST. GFR  (NON AFRICAN AMERICAN): 37 ML/MIN/1.73 M^2
GLUCOSE SERPL-MCNC: 206 MG/DL
HCT VFR BLD AUTO: 25.1 %
HGB BLD-MCNC: 8.8 G/DL
IMM GRANULOCYTES # BLD AUTO: 0.02 K/UL
IMM GRANULOCYTES NFR BLD AUTO: 0.6 %
INR PPP: 1.8
LYMPHOCYTES # BLD AUTO: 0.5 K/UL
LYMPHOCYTES NFR BLD: 15.1 %
MAGNESIUM SERPL-MCNC: 1.7 MG/DL
MCH RBC QN AUTO: 34.2 PG
MCHC RBC AUTO-ENTMCNC: 35.1 G/DL
MCV RBC AUTO: 98 FL
MONOCYTES # BLD AUTO: 0.6 K/UL
MONOCYTES NFR BLD: 18.3 %
NEUTROPHILS # BLD AUTO: 1.6 K/UL
NEUTROPHILS NFR BLD: 51.2 %
NRBC BLD-RTO: 0 /100 WBC
PLATELET # BLD AUTO: 40 K/UL
PMV BLD AUTO: 13.3 FL
POTASSIUM SERPL-SCNC: 3.9 MMOL/L
PROT SERPL-MCNC: 4.5 G/DL
PROTHROMBIN TIME: 17.7 SEC
RBC # BLD AUTO: 2.57 M/UL
SODIUM SERPL-SCNC: 132 MMOL/L
WBC # BLD AUTO: 3.17 K/UL

## 2018-07-01 PROCEDURE — 36415 COLL VENOUS BLD VENIPUNCTURE: CPT | Mod: NTX

## 2018-07-01 PROCEDURE — 20600001 HC STEP DOWN PRIVATE ROOM: Mod: NTX

## 2018-07-01 PROCEDURE — 99233 SBSQ HOSP IP/OBS HIGH 50: CPT | Mod: NTX,,, | Performed by: INTERNAL MEDICINE

## 2018-07-01 PROCEDURE — 25000003 PHARM REV CODE 250: Mod: NTX | Performed by: NURSE PRACTITIONER

## 2018-07-01 PROCEDURE — 25000003 PHARM REV CODE 250: Mod: NTX | Performed by: PHYSICIAN ASSISTANT

## 2018-07-01 PROCEDURE — 80053 COMPREHEN METABOLIC PANEL: CPT | Mod: NTX

## 2018-07-01 PROCEDURE — 85025 COMPLETE CBC W/AUTO DIFF WBC: CPT | Mod: NTX

## 2018-07-01 PROCEDURE — 85610 PROTHROMBIN TIME: CPT | Mod: NTX

## 2018-07-01 PROCEDURE — 25000003 PHARM REV CODE 250: Mod: NTX | Performed by: INTERNAL MEDICINE

## 2018-07-01 PROCEDURE — 83735 ASSAY OF MAGNESIUM: CPT | Mod: NTX

## 2018-07-01 PROCEDURE — 63600175 PHARM REV CODE 636 W HCPCS: Mod: NTX | Performed by: PHYSICIAN ASSISTANT

## 2018-07-01 RX ORDER — SERTRALINE HYDROCHLORIDE 25 MG/1
50 TABLET, FILM COATED ORAL DAILY
Status: DISCONTINUED | OUTPATIENT
Start: 2018-07-01 | End: 2018-07-27

## 2018-07-01 RX ADMIN — SERTRALINE HYDROCHLORIDE 50 MG: 50 TABLET ORAL at 05:07

## 2018-07-01 RX ADMIN — HEPARIN SODIUM 5000 UNITS: 5000 INJECTION, SOLUTION INTRAVENOUS; SUBCUTANEOUS at 02:07

## 2018-07-01 RX ADMIN — PROPRANOLOL HYDROCHLORIDE 10 MG: 10 TABLET ORAL at 08:07

## 2018-07-01 RX ADMIN — LACTULOSE 45 G: 20 SOLUTION ORAL at 05:07

## 2018-07-01 RX ADMIN — PROPRANOLOL HYDROCHLORIDE 10 MG: 10 TABLET ORAL at 09:07

## 2018-07-01 RX ADMIN — OMEGA-3-ACID ETHYL ESTERS 2 G: 1 CAPSULE, LIQUID FILLED ORAL at 09:07

## 2018-07-01 RX ADMIN — PANCRELIPASE 2 CAPSULE: 60000; 12000; 38000 CAPSULE, DELAYED RELEASE PELLETS ORAL at 12:07

## 2018-07-01 RX ADMIN — LEVETIRACETAM 500 MG: 500 TABLET, FILM COATED ORAL at 08:07

## 2018-07-01 RX ADMIN — FLUCONAZOLE 200 MG: 200 TABLET ORAL at 09:07

## 2018-07-01 RX ADMIN — ASPIRIN 81 MG: 81 TABLET, COATED ORAL at 09:07

## 2018-07-01 RX ADMIN — RIFAXIMIN 550 MG: 550 TABLET ORAL at 09:07

## 2018-07-01 RX ADMIN — ONDANSETRON 8 MG: 8 TABLET, ORALLY DISINTEGRATING ORAL at 08:07

## 2018-07-01 RX ADMIN — PANCRELIPASE 2 CAPSULE: 60000; 12000; 38000 CAPSULE, DELAYED RELEASE PELLETS ORAL at 09:07

## 2018-07-01 RX ADMIN — PANTOPRAZOLE SODIUM 40 MG: 40 TABLET, DELAYED RELEASE ORAL at 09:07

## 2018-07-01 RX ADMIN — RIFAXIMIN 550 MG: 550 TABLET ORAL at 08:07

## 2018-07-01 RX ADMIN — LEVETIRACETAM 500 MG: 500 TABLET, FILM COATED ORAL at 09:07

## 2018-07-01 RX ADMIN — HEPARIN SODIUM 5000 UNITS: 5000 INJECTION, SOLUTION INTRAVENOUS; SUBCUTANEOUS at 05:07

## 2018-07-01 RX ADMIN — HEPARIN SODIUM 5000 UNITS: 5000 INJECTION, SOLUTION INTRAVENOUS; SUBCUTANEOUS at 08:07

## 2018-07-01 RX ADMIN — PANTOPRAZOLE SODIUM 40 MG: 40 TABLET, DELAYED RELEASE ORAL at 08:07

## 2018-07-01 RX ADMIN — LACTULOSE 45 G: 20 SOLUTION ORAL at 12:07

## 2018-07-01 RX ADMIN — ONDANSETRON 8 MG: 8 TABLET, ORALLY DISINTEGRATING ORAL at 09:07

## 2018-07-01 RX ADMIN — FUROSEMIDE 40 MG: 40 TABLET ORAL at 09:07

## 2018-07-01 RX ADMIN — CHOLESTYRAMINE 4 G: 4 POWDER, FOR SUSPENSION ORAL at 09:07

## 2018-07-01 RX ADMIN — LIDOCAINE 2 PATCH: 50 PATCH TOPICAL at 12:07

## 2018-07-01 NOTE — PLAN OF CARE
"Problem: Patient Care Overview  Goal: Plan of Care Review  Outcome: Ongoing (interventions implemented as appropriate)  Patient AAO, receiving lactulose 4 BMs noted. Vitals stable.  Shoulder pain noted.  Bilateral lidocaine patches applied.  Patients family at the bedside attentive to patient. BLE red but no weeping noted today.  Patient stated "not feeling well today".      "

## 2018-07-01 NOTE — ASSESSMENT & PLAN NOTE
- Pt on hold for insurance issues and severe malnutrition/deconditioned status + currently bacteremic.  Should re-evaluate for Monday as wife reports pharmacy issues resolved and albumin now improved to 2.9 with use of pre-hab regimen    MELD-Na score: 23 at 6/30/2018  4:23 AM  MELD score: 21 at 6/30/2018  4:23 AM  Calculated from:  Serum Creatinine: 1.7 mg/dL at 6/30/2018  4:23 AM  Serum Sodium: 134 mmol/L at 6/30/2018  4:23 AM  Total Bilirubin: 2.4 mg/dL at 6/30/2018  4:23 AM  INR(ratio): 1.8 at 6/30/2018  4:23 AM  Age: 63 years

## 2018-07-01 NOTE — PROGRESS NOTES
Patient was tearful this evening stated that he did not feel like eating because he was depressed. Patient stated that his favorite aunt just had 2 heart attacks and he was very worried about her.  Patient agreed to drink protein shake.  Will continue to monitor.

## 2018-07-01 NOTE — ASSESSMENT & PLAN NOTE
- Acute on chronic- grade 2 6/27.  - improved from grade 3 with lactulose enemas 6/25 + 6/26.  - continue rifaximin 550 mg BID.  - infx workup initiated: blood/urine/ascites.  - Continue abx: cefepime/vanc --> pt with G+C in blood, coag neg staph- likely contaminate   - but in setting of encephalopathy and ?LE cellulitis continue abx x7 days               Patient has completed antimicrobial therapy

## 2018-07-01 NOTE — SUBJECTIVE & OBJECTIVE
Scheduled Meds:   aspirin  81 mg Oral Daily    cholestyramine  1 packet Oral BID    ergocalciferol  50,000 Units Oral Q7 Days    fluconazole  200 mg Oral Daily    furosemide  40 mg Oral Daily    heparin (porcine)  5,000 Units Subcutaneous Q8H    lactulose  45 g Oral Q6H    levETIRAcetam  500 mg Oral BID    lidocaine  2 patch Transdermal Q24H    lipase-protease-amylase 12,000-38,000-60,000 units  2 capsule Oral TID WM    omega-3 acid ethyl esters  2 g Oral Daily with breakfast    pantoprazole  40 mg Oral BID    propranolol  10 mg Oral BID    rifAXImin  550 mg Oral BID     Continuous Infusions:  PRN Meds:acetaminophen, diphenhydrAMINE-zinc acetate 1-0.1%, lactulose, ondansetron, sodium chloride 0.9%    Review of Systems   Constitutional: Positive for activity change and appetite change. Negative for chills, diaphoresis, fatigue and fever.   HENT: Negative for congestion and facial swelling.    Eyes: Negative for pain, discharge and visual disturbance.   Respiratory: Positive for shortness of breath (with minimal exertion). Negative for cough, chest tightness and wheezing.    Cardiovascular: Positive for leg swelling. Negative for chest pain and palpitations.   Gastrointestinal: Positive for abdominal distention and diarrhea (with lactulose). Negative for abdominal pain, blood in stool, constipation, nausea and vomiting.   Endocrine: Negative.    Genitourinary: Negative for decreased urine volume, dysuria and frequency.   Musculoskeletal: Negative for arthralgias.   Skin: Negative for wound.   Allergic/Immunologic: Positive for immunocompromised state.   Neurological: Positive for tremors and weakness. Negative for headaches.   Hematological: Negative.    Psychiatric/Behavioral: Positive for confusion (improving with increased lactulose).     Objective:     Vital Signs (Most Recent):  Temp: 98.2 °F (36.8 °C) (06/30/18 1508)  Pulse: 69 (06/30/18 1508)  Resp: 20 (06/30/18 1508)  BP: (!) 105/51 (06/30/18  1508)  SpO2: 98 % (06/30/18 1508) Vital Signs (24h Range):  Temp:  [98.2 °F (36.8 °C)-98.9 °F (37.2 °C)] 98.2 °F (36.8 °C)  Pulse:  [64-72] 69  Resp:  [18-20] 20  SpO2:  [96 %-99 %] 98 %  BP: (105-125)/(51-60) 105/51     Weight: (!) 137 kg (302 lb 0.5 oz)  Body mass index is 38.78 kg/m².    Intake/Output - Last 3 Shifts       06/28 0700 - 06/29 0659 06/29 0700 - 06/30 0659 06/30 0700 - 07/01 0659    P.O. 2640 990 1911    I.V. (mL/kg)  100 (0.7)     Blood 350      IV Piggyback  250     Total Intake(mL/kg) 2990 (21.8) 1340 (9.8) 1911 (13.9)    Urine (mL/kg/hr) 1050 (0.3) 525 (0.2) 275 (0.2)    Total Output 1050 525 275    Net +1940 +815 +1636           Urine Occurrence 4 x 4 x 1 x    Stool Occurrence 4 x 4 x 1 x          Physical Exam   Constitutional: He is oriented to person, place, and time. No distress.   Temporal and distal extremity muscle wasting   HENT:   Head: Normocephalic and atraumatic.   Mouth/Throat: No oropharyngeal exudate.   Several broken teeth; poor dentition   Eyes: Scleral icterus is present.   Cardiovascular: Normal rate, regular rhythm and normal heart sounds.    Pulmonary/Chest: Effort normal. He has rales (BLL).   Abdominal: Soft. Bowel sounds are normal. He exhibits distension (ascites). There is no tenderness.   Musculoskeletal: He exhibits edema (2+ BLE).   Neurological: He is alert and oriented to person, place, and time. No cranial nerve deficit.   + asterixis   Skin: He is not diaphoretic. There is erythema.   BLE crackling, scaly skin   Psychiatric:   + confusion though improved   Nursing note and vitals reviewed.      Laboratory:  Immunosuppressants     None        Labs within the past 24 hours have been reviewed.    Diagnostic Results:  I have personally reviewed all pertinent imaging studies.

## 2018-07-01 NOTE — PROGRESS NOTES
[facility]  Liver Transplant  Progress Note    Patient Name: Alon Adamson  MRN: [id number]  Admission Date: 6/22/2018  Hospital Length of Stay: 8 days  Code Status: Full Code  Primary Care Provider: Mckinley Vides MD    Subjective:     History of Present Illness:  Mr. Adamson is a 64 yo M with PMH ESLD secondary to ETOH cirrhosis, CKD3, CAD.  Complications of liver disease include hyperbilirubinemia, hypoalbuminemia, severe malnutrition,   hepatic encephalopathy, thrombocytopenia, splenomegaly, ascites, hypervolemia, coagulopathy, portal HTN.  Pt recently admitted for severe hepatic encephalopathy requiring intubation but has been stable from mentation standpoint post resolution of acute enceophalopathy.      Mr. Adamson was seen in clinic 6/22 and was noted to have significant hypervolemia, weeping from BLE, and VICKY on CKD3 on routine labs.  His MELD increased from 22 to 26 per labs- coordinator notified and MELD updated.  Cr elevated to 2.3 from baseline of 1.4.  Also with worsening ascites.  Pt reported increased pruritis, generalized fatigue and weakness.  He presented in wheelchair.  ROS otherwise negative.      Pt started on IV diuretics 6/22 and continued 6/23 and 6/24.  BLE edema and ascites signif improved with diuresis and kidney function also improved.  Pt noted to have increased asterixis  6/24.  Upon admission, he was noted to have grade 1 hepatic encephalopathy which remained until 6/24 when grade 1-2 HE was noted.  Lactulose increased until pt had a BM- he had not had BM during previous day.  On 6/25 AM, pt with signif worsened encephalopathy opening eyes to verbal and tactile stimuli only.  Pt given lactulose enemas, infx workup sent, started on IV abx, hydrated with albumin, diuretics discontinued, CT head obtained and negative.  Paracentesis 6/25 negative for peritonitis per cell count and U/A negative. Pt also with concern for BLE cellulitis- upper legs bright red with lower  BLE still with dark appearance of venous stasis.  Placed on vancomycin with improvement in BLE redness 6/26.  Cr improved from 2/0 -> 1.9.  Pt began to improve but again returned to previous stated on 6/28.  Pt given more lactulose enemas and by 6/26 PM, was able to answer some orientation questions correctly which was a significant improvement.  Of note, taco placement attempted 6/26 but unable secondary to agitation upon insertion prompting self resolving nose bleed.  Micro lab called 6/26 PM with blood cx + for G+C in blood- pt continued on vanc which was started 6/25.   EEG obtained 6/26 as pt with h/o serizures and negative.  Pt continued on home Keppra.      Pt eating minimally during period of encephalopathy requiring gently hydration with IVF 6/26.  As previously stated, unable to place taco for nutrition.  VICKY initially improved with albumin + diuretics but increased again during period of encephalopathy.    Of note, with chest pain overnight 6/26-6/27 that resolved without intervention and pt reported as mild.  EKG NSR with PVC, troponin 0.1 in setting of hypervolemia + VICKY.  Normal dobutamine stress 1/2018.  Cardiology consulted and felt not ACS, no need for further workup other than continue to treat current risk factors for CAD with ASA (pt already on ASA and statin as outpt) + BB for portal hypertension which was started.      Hospital Course:  Interval hx: No acute events overnight.  Sitting out of bed in chair this morning.  Ongoing pruritus.  Mental status at baseline.  Patient remains on status 7 duet to nutrition and pharmacy coverage.     Scheduled Meds:   aspirin  81 mg Oral Daily    cholestyramine  1 packet Oral BID    ergocalciferol  50,000 Units Oral Q7 Days    fluconazole  200 mg Oral Daily    furosemide  40 mg Oral Daily    heparin (porcine)  5,000 Units Subcutaneous Q8H    lactulose  45 g Oral Q6H    levETIRAcetam  500 mg Oral BID    lidocaine  2 patch Transdermal Q24H     lipase-protease-amylase 12,000-38,000-60,000 units  2 capsule Oral TID WM    omega-3 acid ethyl esters  2 g Oral Daily with breakfast    pantoprazole  40 mg Oral BID    propranolol  10 mg Oral BID    rifAXImin  550 mg Oral BID     Continuous Infusions:  PRN Meds:acetaminophen, diphenhydrAMINE-zinc acetate 1-0.1%, lactulose, ondansetron, sodium chloride 0.9%    Review of Systems   Constitutional: Positive for activity change and appetite change. Negative for chills, diaphoresis, fatigue and fever.   HENT: Negative for congestion and facial swelling.    Eyes: Negative for pain, discharge and visual disturbance.   Respiratory: Positive for shortness of breath (with minimal exertion). Negative for cough, chest tightness and wheezing.    Cardiovascular: Positive for leg swelling. Negative for chest pain and palpitations.   Gastrointestinal: Positive for abdominal distention and diarrhea (with lactulose). Negative for abdominal pain, blood in stool, constipation, nausea and vomiting.   Endocrine: Negative.    Genitourinary: Negative for decreased urine volume, dysuria and frequency.   Musculoskeletal: Negative for arthralgias.   Skin: Negative for wound.   Allergic/Immunologic: Positive for immunocompromised state.   Neurological: Positive for tremors and weakness. Negative for headaches.   Hematological: Negative.    Psychiatric/Behavioral: Positive for confusion (improving with increased lactulose).     Objective:     Vital Signs (Most Recent):  Temp: 98.2 °F (36.8 °C) (06/30/18 1508)  Pulse: 69 (06/30/18 1508)  Resp: 20 (06/30/18 1508)  BP: (!) 105/51 (06/30/18 1508)  SpO2: 98 % (06/30/18 1508) Vital Signs (24h Range):  Temp:  [98.2 °F (36.8 °C)-98.9 °F (37.2 °C)] 98.2 °F (36.8 °C)  Pulse:  [64-72] 69  Resp:  [18-20] 20  SpO2:  [96 %-99 %] 98 %  BP: (105-125)/(51-60) 105/51     Weight: (!) 137 kg (302 lb 0.5 oz)  Body mass index is 38.78 kg/m².    Intake/Output - Last 3 Shifts       06/28 0700 - 06/29 0659 06/29  0700 - 06/30 0659 06/30 0700 - 07/01 0659    P.O. 2640 990 1911    I.V. (mL/kg)  100 (0.7)     Blood 350      IV Piggyback  250     Total Intake(mL/kg) 2990 (21.8) 1340 (9.8) 1911 (13.9)    Urine (mL/kg/hr) 1050 (0.3) 525 (0.2) 275 (0.2)    Total Output 1050 525 275    Net +1940 +815 +1636           Urine Occurrence 4 x 4 x 1 x    Stool Occurrence 4 x 4 x 1 x          Physical Exam   Constitutional: He is oriented to person, place, and time. No distress.   Temporal and distal extremity muscle wasting   HENT:   Head: Normocephalic and atraumatic.   Mouth/Throat: No oropharyngeal exudate.   Several broken teeth; poor dentition   Eyes: Scleral icterus is present.   Cardiovascular: Normal rate, regular rhythm and normal heart sounds.    Pulmonary/Chest: Effort normal. He has rales (BLL).   Abdominal: Soft. Bowel sounds are normal. He exhibits distension (ascites). There is no tenderness.   Musculoskeletal: He exhibits edema (2+ BLE).   Neurological: He is alert and oriented to person, place, and time. No cranial nerve deficit.   + asterixis   Skin: He is not diaphoretic. There is erythema.   BLE crackling, scaly skin   Psychiatric:   + confusion though improved   Nursing note and vitals reviewed.      Laboratory:  Immunosuppressants     None        Labs within the past 24 hours have been reviewed.    Diagnostic Results:  I have personally reviewed all pertinent imaging studies.    Assessment/Plan:     * Acute kidney injury superimposed on chronic kidney disease    - VICKY initially improving with albumin infusion but with minimal intake 6/25 and 6/26.  - Ur Na: 32, unlikely for HRS.  - Creatinine slightly improved with albumin and lasix on 6/27.  - repeat lasix and albumin today.          Gram-positive bacteremia    - Vanc started 6/25  - Initial + cx 6/25  - Repeat cx 6/27, NGTD  - ID consulted 6/27 for tx course + any additional recs  - pt with G+C in blood from 6/25, coag neg staph- likely contaminate   - but in  "setting of encephalopathy and ?LE cellulitis continue abx x7 days (through 7/1/18)        Dermatitis associated with moisture              Cellulitis of both lower extremities    - Cont vanc x7 days (through 7/1/18) --> erythema signif improved.  - Monitor.          Physical deconditioning    - PT consulted.  - encourage OOBTC.          Ascites due to alcoholic cirrhosis    - Para 6/25 negative for infection.          Coagulopathy    - Secondary to decompensated liver disease.          CKD (chronic kidney disease), stage III    Creatinine 1.7 today        Cholestatic pruritus    - trial of cholestyramine          Hypoalbuminemia due to protein-calorie malnutrition    - Dietary consulted for calorie count.  - Pt eating well today and boost supplement ordered.  - "pre-hab" regimen and supplements ordered.          Decompensated hepatic cirrhosis    - Pt on hold for insurance issues and severe malnutrition/deconditioned status + currently bacteremic.  Should re-evaluate for Monday as wife reports pharmacy issues resolved and albumin now improved to 2.9 with use of pre-hab regimen    MELD-Na score: 23 at 6/30/2018  4:23 AM  MELD score: 21 at 6/30/2018  4:23 AM  Calculated from:  Serum Creatinine: 1.7 mg/dL at 6/30/2018  4:23 AM  Serum Sodium: 134 mmol/L at 6/30/2018  4:23 AM  Total Bilirubin: 2.4 mg/dL at 6/30/2018  4:23 AM  INR(ratio): 1.8 at 6/30/2018  4:23 AM  Age: 63 years            Coronary artery disease involving native coronary artery of native heart without angina pectoris    - Continue home ASA.          Anemia of chronic disease    - H&H improved today.  - 1 unit PRBC transfused 6/28.           Thrombocytopenia    - Secondary to splenomegaly from portal HTN- should improve with txp.  - No s/s overt bleed.          Seizures    - Oral Keppra transitioned to IV as pt not able to swallow 6/26.  - Resumed oral Keppra since mentation improved.  - EEG 6/26 negative.        Bilateral edema of lower extremity    Po " furosemide today          Hepatic encephalopathy    - Acute on chronic- grade 2 6/27.  - improved from grade 3 with lactulose enemas 6/25 + 6/26.  - continue rifaximin 550 mg BID.  - infx workup initiated: blood/urine/ascites.  - Continue abx: cefepime/vanc --> pt with G+C in blood, coag neg staph- likely contaminate   - but in setting of encephalopathy and ?LE cellulitis continue abx x7 days               Patient has completed antimicrobial therapy               VTE Risk Mitigation         Ordered     heparin (porcine) injection 5,000 Units  Every 8 hours      06/22/18 1502     IP VTE HIGH RISK PATIENT  Once      06/22/18 1502          The patients clinical status was discussed at multidisplinary rounds, involving transplant surgery, transplant medicine, pharmacy, nursing, nutrition, and social work    Discharge Planning:  Not stable for discharge    Milvia Wen MD  Liver Transplant  [facility]

## 2018-07-02 LAB
ALBUMIN SERPL BCP-MCNC: 2.6 G/DL
ALP SERPL-CCNC: 70 U/L
ALT SERPL W/O P-5'-P-CCNC: 29 U/L
ANION GAP SERPL CALC-SCNC: 7 MMOL/L
ANISOCYTOSIS BLD QL SMEAR: SLIGHT
AST SERPL-CCNC: 47 U/L
BACTERIA BLD CULT: NORMAL
BACTERIA BLD CULT: NORMAL
BASOPHILS # BLD AUTO: 0.04 K/UL
BASOPHILS NFR BLD: 0.8 %
BILIRUB SERPL-MCNC: 2.7 MG/DL
BUN SERPL-MCNC: 35 MG/DL
BURR CELLS BLD QL SMEAR: ABNORMAL
CALCIUM SERPL-MCNC: 8.6 MG/DL
CHLORIDE SERPL-SCNC: 109 MMOL/L
CO2 SERPL-SCNC: 16 MMOL/L
CREAT SERPL-MCNC: 1.8 MG/DL
DIFFERENTIAL METHOD: ABNORMAL
EOSINOPHIL # BLD AUTO: 0.6 K/UL
EOSINOPHIL NFR BLD: 11.9 %
ERYTHROCYTE [DISTWIDTH] IN BLOOD BY AUTOMATED COUNT: 18.1 %
EST. GFR  (AFRICAN AMERICAN): 45.6 ML/MIN/1.73 M^2
EST. GFR  (NON AFRICAN AMERICAN): 39.5 ML/MIN/1.73 M^2
GLUCOSE SERPL-MCNC: 146 MG/DL
HCT VFR BLD AUTO: 26.6 %
HGB BLD-MCNC: 8.6 G/DL
HYPOCHROMIA BLD QL SMEAR: ABNORMAL
IMM GRANULOCYTES # BLD AUTO: 0.04 K/UL
IMM GRANULOCYTES NFR BLD AUTO: 0.8 %
INR PPP: 1.6
LYMPHOCYTES # BLD AUTO: 0.6 K/UL
LYMPHOCYTES NFR BLD: 12.3 %
MAGNESIUM SERPL-MCNC: 1.8 MG/DL
MCH RBC QN AUTO: 33.2 PG
MCHC RBC AUTO-ENTMCNC: 32.3 G/DL
MCV RBC AUTO: 103 FL
MONOCYTES # BLD AUTO: 0.9 K/UL
MONOCYTES NFR BLD: 19 %
NEUTROPHILS # BLD AUTO: 2.7 K/UL
NEUTROPHILS NFR BLD: 55.2 %
NRBC BLD-RTO: 0 /100 WBC
PLATELET # BLD AUTO: 49 K/UL
PLATELET BLD QL SMEAR: ABNORMAL
PMV BLD AUTO: 12.9 FL
POIKILOCYTOSIS BLD QL SMEAR: SLIGHT
POLYCHROMASIA BLD QL SMEAR: ABNORMAL
POTASSIUM SERPL-SCNC: 4.2 MMOL/L
PROT SERPL-MCNC: 4.7 G/DL
PROTHROMBIN TIME: 16.3 SEC
RBC # BLD AUTO: 2.59 M/UL
SODIUM SERPL-SCNC: 132 MMOL/L
WBC # BLD AUTO: 4.89 K/UL

## 2018-07-02 PROCEDURE — 97535 SELF CARE MNGMENT TRAINING: CPT | Mod: NTX

## 2018-07-02 PROCEDURE — 20600001 HC STEP DOWN PRIVATE ROOM: Mod: NTX

## 2018-07-02 PROCEDURE — 80053 COMPREHEN METABOLIC PANEL: CPT | Mod: NTX

## 2018-07-02 PROCEDURE — 97110 THERAPEUTIC EXERCISES: CPT | Mod: NTX

## 2018-07-02 PROCEDURE — P9047 ALBUMIN (HUMAN), 25%, 50ML: HCPCS | Mod: JG,NTX | Performed by: NURSE PRACTITIONER

## 2018-07-02 PROCEDURE — 25000003 PHARM REV CODE 250: Mod: NTX | Performed by: INTERNAL MEDICINE

## 2018-07-02 PROCEDURE — 25000003 PHARM REV CODE 250: Mod: NTX | Performed by: PHYSICIAN ASSISTANT

## 2018-07-02 PROCEDURE — 99233 SBSQ HOSP IP/OBS HIGH 50: CPT | Mod: NTX,,, | Performed by: NURSE PRACTITIONER

## 2018-07-02 PROCEDURE — 85025 COMPLETE CBC W/AUTO DIFF WBC: CPT | Mod: NTX

## 2018-07-02 PROCEDURE — 63600175 PHARM REV CODE 636 W HCPCS: Mod: NTX | Performed by: PHYSICIAN ASSISTANT

## 2018-07-02 PROCEDURE — 25000003 PHARM REV CODE 250: Mod: NTX | Performed by: NURSE PRACTITIONER

## 2018-07-02 PROCEDURE — 97530 THERAPEUTIC ACTIVITIES: CPT | Mod: NTX

## 2018-07-02 PROCEDURE — 63600175 PHARM REV CODE 636 W HCPCS: Mod: JG,NTX | Performed by: NURSE PRACTITIONER

## 2018-07-02 PROCEDURE — 36415 COLL VENOUS BLD VENIPUNCTURE: CPT | Mod: NTX

## 2018-07-02 PROCEDURE — 83735 ASSAY OF MAGNESIUM: CPT | Mod: NTX

## 2018-07-02 PROCEDURE — 85610 PROTHROMBIN TIME: CPT | Mod: NTX

## 2018-07-02 PROCEDURE — 97116 GAIT TRAINING THERAPY: CPT | Mod: NTX

## 2018-07-02 RX ORDER — PROPRANOLOL HYDROCHLORIDE 20 MG/5ML
5 SOLUTION ORAL 2 TIMES DAILY
Status: DISCONTINUED | OUTPATIENT
Start: 2018-07-02 | End: 2018-07-12

## 2018-07-02 RX ORDER — FUROSEMIDE 40 MG/1
40 TABLET ORAL DAILY
Status: DISCONTINUED | OUTPATIENT
Start: 2018-07-02 | End: 2018-07-05

## 2018-07-02 RX ORDER — LACTULOSE 10 G/15ML
30 SOLUTION ORAL EVERY 6 HOURS
Status: DISCONTINUED | OUTPATIENT
Start: 2018-07-02 | End: 2018-07-03

## 2018-07-02 RX ORDER — ALBUMIN HUMAN 250 G/1000ML
25 SOLUTION INTRAVENOUS ONCE
Status: COMPLETED | OUTPATIENT
Start: 2018-07-02 | End: 2018-07-02

## 2018-07-02 RX ADMIN — LEVETIRACETAM 500 MG: 500 TABLET, FILM COATED ORAL at 09:07

## 2018-07-02 RX ADMIN — RIFAXIMIN 550 MG: 550 TABLET ORAL at 09:07

## 2018-07-02 RX ADMIN — LACTULOSE 45 G: 20 SOLUTION ORAL at 06:07

## 2018-07-02 RX ADMIN — LIDOCAINE 1 PATCH: 50 PATCH TOPICAL at 10:07

## 2018-07-02 RX ADMIN — SERTRALINE HYDROCHLORIDE 50 MG: 50 TABLET ORAL at 09:07

## 2018-07-02 RX ADMIN — LEVETIRACETAM 500 MG: 500 TABLET, FILM COATED ORAL at 10:07

## 2018-07-02 RX ADMIN — PROPRANOLOL HYDROCHLORIDE 5 MG: 20 SOLUTION ORAL at 10:07

## 2018-07-02 RX ADMIN — LACTULOSE 30 G: 20 SOLUTION ORAL at 05:07

## 2018-07-02 RX ADMIN — HEPARIN SODIUM 5000 UNITS: 5000 INJECTION, SOLUTION INTRAVENOUS; SUBCUTANEOUS at 06:07

## 2018-07-02 RX ADMIN — PANCRELIPASE 2 CAPSULE: 60000; 12000; 38000 CAPSULE, DELAYED RELEASE PELLETS ORAL at 05:07

## 2018-07-02 RX ADMIN — PROPRANOLOL HYDROCHLORIDE 10 MG: 10 TABLET ORAL at 09:07

## 2018-07-02 RX ADMIN — PANTOPRAZOLE SODIUM 40 MG: 40 TABLET, DELAYED RELEASE ORAL at 10:07

## 2018-07-02 RX ADMIN — PANTOPRAZOLE SODIUM 40 MG: 40 TABLET, DELAYED RELEASE ORAL at 09:07

## 2018-07-02 RX ADMIN — LACTULOSE 30 G: 20 SOLUTION ORAL at 11:07

## 2018-07-02 RX ADMIN — ONDANSETRON 8 MG: 8 TABLET, ORALLY DISINTEGRATING ORAL at 09:07

## 2018-07-02 RX ADMIN — FLUCONAZOLE 200 MG: 200 TABLET ORAL at 09:07

## 2018-07-02 RX ADMIN — FUROSEMIDE 40 MG: 40 TABLET ORAL at 10:07

## 2018-07-02 RX ADMIN — PANCRELIPASE 1 CAPSULE: 60000; 12000; 38000 CAPSULE, DELAYED RELEASE PELLETS ORAL at 11:07

## 2018-07-02 RX ADMIN — OMEGA-3-ACID ETHYL ESTERS 2 G: 1 CAPSULE, LIQUID FILLED ORAL at 09:07

## 2018-07-02 RX ADMIN — LACTULOSE 30 G: 20 SOLUTION ORAL at 10:07

## 2018-07-02 RX ADMIN — ALBUMIN HUMAN 25 G: 0.25 SOLUTION INTRAVENOUS at 10:07

## 2018-07-02 RX ADMIN — ASPIRIN 81 MG: 81 TABLET, COATED ORAL at 09:07

## 2018-07-02 RX ADMIN — HEPARIN SODIUM 5000 UNITS: 5000 INJECTION, SOLUTION INTRAVENOUS; SUBCUTANEOUS at 10:07

## 2018-07-02 RX ADMIN — Medication: at 02:07

## 2018-07-02 RX ADMIN — PANCRELIPASE 2 CAPSULE: 60000; 12000; 38000 CAPSULE, DELAYED RELEASE PELLETS ORAL at 09:07

## 2018-07-02 RX ADMIN — HEPARIN SODIUM 5000 UNITS: 5000 INJECTION, SOLUTION INTRAVENOUS; SUBCUTANEOUS at 02:07

## 2018-07-02 RX ADMIN — RIFAXIMIN 550 MG: 550 TABLET ORAL at 10:07

## 2018-07-02 NOTE — ASSESSMENT & PLAN NOTE
- Vanc started 6/25  - Initial + cx 6/25  - Repeat cx 6/27, NGTD  - ID consulted 6/27 for tx course + any additional recs  - pt with G+C in blood from 6/25, coag neg staph- likely contaminate   - but in setting of encephalopathy and ?LE cellulitis continue abx x7 days (end date 7/1/18)

## 2018-07-02 NOTE — ASSESSMENT & PLAN NOTE
- Acute on chronic- at baseline mental status at this time  - improved from grade 3 with lactulose enemas 6/25 + 6/26.  - continue rifaximin 550 mg BID.  - infx workup initiated: blood/urine/ascites.  - Continue abx: cefepime/vanc --> pt with G+C in blood, coag neg staph- likely contaminate   - but in setting of encephalopathy and ?LE cellulitis continue abx x7 days               Patient has completed antimicrobial therapy

## 2018-07-02 NOTE — PLAN OF CARE
Problem: Physical Therapy Goal  Goal: Physical Therapy Goal  Goals to be met by: 18     Patient will increase functional independence with mobility by performin. Supine to sit with Stand-by Assistance  2. Sit to stand transfer with Stand-by Assistance - met   2a. Sit to stand with S  3. Gait  x 100 feet with Contact Guard Assistance using Rolling Walker. - met   3a. Gait x200 ft with Supervision with RW - GOAL MET   4. Ascend/descend 5 stair with right Handrails Contact Guard Assistance.   5. Lower extremity exercise program x 20 reps, with supervision, in order to increase LE strength and (I) with functional mobility.   6. Pt to perf 6MWT: amb 440', to demonstrate a clinically significant improvement in aerobic capacity.          Outcome: Ongoing (interventions implemented as appropriate)  Pt achieved 2 goals, new gait goal added to reflect CLOF.

## 2018-07-02 NOTE — PLAN OF CARE
Problem: Patient Care Overview  Goal: Plan of Care Review  Outcome: Ongoing (interventions implemented as appropriate)  Pateint AAO today 1+assist with ambulation.  Patients mother and son at the bedside attentive to patient.  No vomit noted today.  Patient has had 2 bm per lactulose, able to get up to the restroom. Lidocaine patches applied to B shoulders.  Benadryl cream applied to skin to prevent itching.  Appetite fair today, patient is drinking supplements.  Received lasix and albumen today.  BLE remained reddened, moisturizer applied, no weeping noticed today.

## 2018-07-02 NOTE — PROGRESS NOTES
[facility]  Liver Transplant  Progress Note    Patient Name: Alon Adamson  MRN: [id number]  Admission Date: 6/22/2018  Hospital Length of Stay: 10 days  Code Status: Full Code  Primary Care Provider: Mckinley Vides MD  Post-Operative Day:     ORGAN:     Disease Etiology: Alcoholic Cirrhosis  Donor Type:     CDC High Risk:     Donor CMV Status:   Donor CMV Status:   Donor HBcAB:     Donor HCV Status:     Donor HBV TAMAR: Organ record is missing.  Donor HCV TAMAR: Organ record is missing.  Whole or Partial:   Biliary Anastomosis:   Arterial Anatomy:   Subjective:     History of Present Illness:  Mr. 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  Complications of liver disease include hyperbilirubinemia, hypoalbuminemia, severe malnutrition,   hepatic encephalopathy, thrombocytopenia, splenomegaly, ascites, hypervolemia, coagulopathy, portal HTN.  Pt recently admitted for severe hepatic encephalopathy requiring intubation but has been stable from mentation standpoint post resolution of acute enceophalopathy.      Mr. Adamson was seen in clinic 6/22 and was noted to have significant hypervolemia, weeping from BLE, and VICKY on CKD3 on routine labs.  His MELD increased from 22 to 26 per labs- coordinator notified and MELD updated.  Cr elevated to 2.3 from baseline of 1.4.  Also with worsening ascites.  Pt reported increased pruritis, generalized fatigue and weakness.  He presented in wheelchair.  ROS otherwise negative.      Pt started on IV diuretics 6/22 and continued 6/23 and 6/24.  BLE edema and ascites signif improved with diuresis and kidney function also improved.  Pt noted to have increased asterixis  6/24.  Upon admission, he was noted to have grade 1 hepatic encephalopathy which remained until 6/24 when grade 1-2 HE was noted.  Lactulose increased until pt had a BM- he had not had BM during previous day.  On 6/25 AM, pt with signif worsened encephalopathy opening eyes  to verbal and tactile stimuli only.  Pt given lactulose enemas, infx workup sent, started on IV abx, hydrated with albumin, diuretics discontinued, CT head obtained and negative.  Paracentesis 6/25 negative for peritonitis per cell count and U/A negative. Pt also with concern for BLE cellulitis- upper legs bright red with lower BLE still with dark appearance of venous stasis.  Placed on vancomycin with improvement in BLE redness 6/26.  Cr improved from 2/0 -> 1.9.  Pt began to improve but again returned to previous stated on 6/28.  Pt given more lactulose enemas and by 6/26 PM, was able to answer some orientation questions correctly which was a significant improvement.  Of note, taco placement attempted 6/26 but unable secondary to agitation upon insertion prompting self resolving nose bleed.  Micro lab called 6/26 PM with blood cx + for G+C in blood- pt continued on vanc which was started 6/25.   EEG obtained 6/26 as pt with h/o serizures and negative.  Pt continued on home Keppra.      Pt eating minimally during period of encephalopathy requiring gently hydration with IVF 6/26.  As previously stated, unable to place taco for nutrition.  VICKY initially improved with albumin + diuretics but increased again during period of encephalopathy.    Of note, with chest pain overnight 6/26-6/27 that resolved without intervention and pt reported as mild.  EKG NSR with PVC, troponin 0.1 in setting of hypervolemia + VICKY.  Normal dobutamine stress 1/2018.  Cardiology consulted and felt not ACS, no need for further workup other than continue to treat current risk factors for CAD with ASA (pt already on ASA and statin as outpt) + BB for portal hypertension which was started.      Hospital Course:  Interval hx: No acute events overnight. Pt working well with therapy. His mood is better today. Encouraged to continue increasing po intake and participating with PT. Cr 1.8 from 1.9 yesterday. Lasix 40 mg po ordered daily with albumin 25 %  x 1 dose. Monitor.     Scheduled Meds:   aspirin  81 mg Oral Daily    ergocalciferol  50,000 Units Oral Q7 Days    fluconazole  200 mg Oral Daily    furosemide  40 mg Oral Daily    heparin (porcine)  5,000 Units Subcutaneous Q8H    lactulose  30 g Oral Q6H    levETIRAcetam  500 mg Oral BID    lidocaine  2 patch Transdermal Q24H    lipase-protease-amylase 12,000-38,000-60,000 units  2 capsule Oral TID WM    omega-3 acid ethyl esters  2 g Oral Daily with breakfast    pantoprazole  40 mg Oral BID    promethazine (PHENERGAN) IVPB  12.5 mg Intravenous Once    propranolol  5 mg Oral BID    rifAXImin  550 mg Oral BID    sertraline  50 mg Oral Daily     Continuous Infusions:  PRN Meds:acetaminophen, diphenhydrAMINE-zinc acetate 1-0.1%, lactulose, ondansetron, sodium chloride 0.9%    Review of Systems   Constitutional: Positive for appetite change and fatigue. Negative for activity change, chills, diaphoresis, fever and unexpected weight change.   HENT: Negative for congestion, facial swelling, hearing loss and sore throat.    Eyes: Negative for pain, discharge and visual disturbance.   Respiratory: Negative for cough, chest tightness, shortness of breath and wheezing.    Cardiovascular: Positive for leg swelling. Negative for chest pain and palpitations.   Gastrointestinal: Positive for diarrhea (with lactulose). Negative for abdominal distention, abdominal pain, blood in stool, constipation, nausea and vomiting.   Endocrine: Negative.    Genitourinary: Negative for decreased urine volume, dysuria and frequency.   Musculoskeletal: Negative for arthralgias and gait problem.   Skin: Negative for rash and wound.   Allergic/Immunologic: Positive for immunocompromised state.   Neurological: Positive for tremors. Negative for weakness and headaches.   Hematological: Negative.  Negative for adenopathy. Does not bruise/bleed easily.   Psychiatric/Behavioral: Positive for confusion.     Objective:     Vital Signs  (Most Recent):  Temp: 97.5 °F (36.4 °C) (07/02/18 1214)  Pulse: 61 (07/02/18 1214)  Resp: 17 (07/02/18 1214)  BP: (!) 105/53 (07/02/18 1214)  SpO2: 99 % (07/02/18 1214) Vital Signs (24h Range):  Temp:  [97.5 °F (36.4 °C)-98.8 °F (37.1 °C)] 97.5 °F (36.4 °C)  Pulse:  [60-72] 61  Resp:  [16-18] 17  SpO2:  [96 %-99 %] 99 %  BP: ()/(43-66) 105/53     Weight: (!) 140.3 kg (309 lb 4.9 oz)  Body mass index is 39.71 kg/m².    Intake/Output - Last 3 Shifts       06/30 0700 - 07/01 0659 07/01 0700 - 07/02 0659 07/02 0700 - 07/03 0659    P.O. 2241 650     Total Intake(mL/kg) 2241 (16.4) 650 (4.6)     Urine (mL/kg/hr) 965 (0.3) 880 (0.3)     Emesis/NG output  300 (0.1)     Total Output 965 1180      Net +1276 -530             Urine Occurrence 2 x 1 x     Stool Occurrence 4 x 7 x     Emesis Occurrence  2 x           Physical Exam   Constitutional: He is oriented to person, place, and time. No distress.   Temporal and distal extremity muscle wasting   HENT:   Head: Normocephalic and atraumatic.   Mouth/Throat: No oropharyngeal exudate.   Several broken teeth; poor dentition   Eyes: Scleral icterus is present.   Cardiovascular: Normal rate, regular rhythm and normal heart sounds.    Pulmonary/Chest: Effort normal. He has rales (BLL).   Abdominal: Soft. Bowel sounds are normal. He exhibits distension (ascites). There is no tenderness.   Musculoskeletal: He exhibits edema (2+ BLE).   Neurological: He is alert and oriented to person, place, and time.   + asterixis   Skin: He is not diaphoretic. There is erythema.   BLE crackling, scaly skin   Psychiatric:   + confusion though improved   Nursing note and vitals reviewed.      Laboratory:  Immunosuppressants     None        CBC:   Recent Labs  Lab 07/02/18  0346   WBC 4.89   RBC 2.59*   HGB 8.6*   HCT 26.6*   PLT 49*   *   MCH 33.2*   MCHC 32.3     CMP:   Recent Labs  Lab 07/02/18  0346   *   CALCIUM 8.6*   ALBUMIN 2.6*   PROT 4.7*   *   K 4.2   CO2 16*   CL  "109   BUN 35*   CREATININE 1.8*   ALKPHOS 70   ALT 29   AST 47*   BILITOT 2.7*     Labs within the past 24 hours have been reviewed.    Diagnostic Results:  I have personally reviewed all pertinent imaging studies.    Assessment/Plan:     * Acute kidney injury superimposed on chronic kidney disease    - VICKY initially improving with albumin infusion but with minimal intake 6/25 and 6/26.  - Ur Na: 32, unlikely for HRS.  - Creatinine slightly improved with albumin and lasix on 6/27.  - creatinine 1.8 today. Continue lasix and albumin.          Gram-positive bacteremia    - Vanc started 6/25  - Initial + cx 6/25  - Repeat cx 6/27, NGTD  - ID consulted 6/27 for tx course + any additional recs  - pt with G+C in blood from 6/25, coag neg staph- likely contaminate   - but in setting of encephalopathy and ?LE cellulitis continue abx x7 days (end date 7/1/18)        Dermatitis associated with moisture              Cellulitis of both lower extremities    - Cont vanc x7 days (completed 7/1/18) --> erythema signif improved.  - Monitor.          Physical deconditioning    - PT consulted.  - encourage OOBTC.          Ascites due to alcoholic cirrhosis    - Para 6/25 negative for infection.          Coagulopathy    - Secondary to decompensated liver disease.          CKD (chronic kidney disease), stage III    - Creatinine 1.8 today. Continue diuretics.         Cholestatic pruritus    - cholestyramine has previously been ineffective, given depressed mood; will initiate SSRI for mood and pruritus.          Hypoalbuminemia due to protein-calorie malnutrition    - Dietary consulted for calorie count.  - Pt eating well today and boost supplement ordered.  - "pre-hab" regimen and supplements ordered.          Decompensated hepatic cirrhosis    - Pt on hold for insurance issues and severe malnutrition/deconditioned status + currently bacteremic.  Should re-evaluate for Monday as wife reports pharmacy issues resolved and albumin now " improved with use of pre-hab regimen    MELD-Na score: 24 at 7/2/2018  3:46 AM  MELD score: 21 at 7/2/2018  3:46 AM  Calculated from:  Serum Creatinine: 1.8 mg/dL at 7/2/2018  3:46 AM  Serum Sodium: 132 mmol/L at 7/2/2018  3:46 AM  Total Bilirubin: 2.7 mg/dL at 7/2/2018  3:46 AM  INR(ratio): 1.6 at 7/2/2018  3:46 AM  Age: 63 years            Coronary artery disease involving native coronary artery of native heart without angina pectoris    - Continue home ASA.          Anemia of chronic disease    - H&H stable.  - 1 unit PRBC transfused 6/28.           Thrombocytopenia    - Secondary to splenomegaly from portal HTN- should improve with txp.  - No s/s overt bleed.          Seizures    - Oral Keppra transitioned to IV as pt not able to swallow 6/26.  - Resumed oral Keppra since mentation improved.  - EEG 6/26 negative.        Bilateral edema of lower extremity    - Cr 1.8 from 1.9  - Lasix 40 mg PO daily, Albumin 25% x 1 dose today.        Hepatic encephalopathy    - Acute on chronic- at baseline mental status at this time.  - improved from grade 3 with lactulose enemas 6/25 + 6/26.  - continue rifaximin 550 mg BID.  - infx workup initiated: blood/urine/ascites.  - Continue abx: cefepime/vanc --> pt with G+C in blood, coag neg staph- likely contaminate   - but in setting of encephalopathy and ?LE cellulitis continue abx x7 days              - Patient has completed antimicrobial therapy on 7/1.              VTE Risk Mitigation         Ordered     heparin (porcine) injection 5,000 Units  Every 8 hours      06/22/18 1502     IP VTE HIGH RISK PATIENT  Once      06/22/18 1502          The patients clinical status was discussed at multidisplinary rounds, involving transplant surgery, transplant medicine, pharmacy, nursing, nutrition, and social work    Discharge Planning: not a candidate for dc at this time.       Daphne Day NP  Liver Transplant  [facility]

## 2018-07-02 NOTE — PROGRESS NOTES
Notified KENTRELL Day of the following:  Patient vomited 2 times last night.  Yesterday evening the patient stated that he did not feel well.  He was nauseated, zofran did not help.  Patient felt better after he vomited the second time, but did not eat much yesterday, and according to the night rn, there was lots of undigested food in the vomit, Patients mother stated that the vomit smelled like stool.  4 bms noted yesterday.  Will continue to monitor.

## 2018-07-02 NOTE — ASSESSMENT & PLAN NOTE
- cholestyramine has previously been ineffective, given depressed mood; will initiate SSRI for mood and pruritus.

## 2018-07-02 NOTE — ASSESSMENT & PLAN NOTE
- VICKY initially improving with albumin infusion but with minimal intake 6/25 and 6/26.  - Ur Na: 32, unlikely for HRS.  - Creatinine slightly improved with albumin and lasix on 6/27.  - creatinine slightly increased to 1.9 today

## 2018-07-02 NOTE — PLAN OF CARE
Problem: Occupational Therapy Goal  Goal: Occupational Therapy Goal  Goals to be met by: 7/13/18     Patient will increase functional independence with ADLs by performing:    Grooming while standing at sink with Stand-by Assistance. - MET 7/2  Toileting from toilet with Stand-by Assistance for hygiene and clothing management.  Toilet transfer to toilet with Stand-by Assistance.MET 7/2  Upper extremity exercise program x15 reps per handout, with assistance as needed.  Pt will engage in functional mobility to simulate household distances in order to maximize functional activity tolerance required for engagement in occupations of choice with SBA          Outcome: Ongoing (interventions implemented as appropriate)  Pt is progressing well towards all goals.   Continue POC     Abbey Barnard, OTR/L  [phone]  7/2/2018

## 2018-07-02 NOTE — ASSESSMENT & PLAN NOTE
- cholestyramine has previously been ineffective, given depressed mood; will initiate SSRI for mood and pruritus

## 2018-07-02 NOTE — ASSESSMENT & PLAN NOTE
- VICKY initially improving with albumin infusion but with minimal intake 6/25 and 6/26.  - Ur Na: 32, unlikely for HRS.  - Creatinine slightly improved with albumin and lasix on 6/27.  - creatinine 1.8 today. Continue lasix and albumin.

## 2018-07-02 NOTE — SUBJECTIVE & OBJECTIVE
Scheduled Meds:   aspirin  81 mg Oral Daily    ergocalciferol  50,000 Units Oral Q7 Days    fluconazole  200 mg Oral Daily    heparin (porcine)  5,000 Units Subcutaneous Q8H    lactulose  45 g Oral Q6H    levETIRAcetam  500 mg Oral BID    lidocaine  2 patch Transdermal Q24H    lipase-protease-amylase 12,000-38,000-60,000 units  2 capsule Oral TID WM    omega-3 acid ethyl esters  2 g Oral Daily with breakfast    pantoprazole  40 mg Oral BID    propranolol  10 mg Oral BID    rifAXImin  550 mg Oral BID    sertraline  50 mg Oral Daily     Continuous Infusions:  PRN Meds:acetaminophen, diphenhydrAMINE-zinc acetate 1-0.1%, lactulose, ondansetron, sodium chloride 0.9%    Review of Systems   Constitutional: Positive for appetite change and fatigue. Negative for activity change, chills and unexpected weight change.   HENT: Negative for hearing loss and sore throat.    Eyes: Negative for visual disturbance.   Respiratory: Negative for shortness of breath.    Cardiovascular: Positive for leg swelling. Negative for chest pain.   Gastrointestinal: Negative for abdominal distention, abdominal pain, nausea and vomiting.   Musculoskeletal: Negative for gait problem.   Skin: Negative for rash.   Neurological: Negative for weakness and headaches.   Hematological: Negative for adenopathy. Does not bruise/bleed easily.   Psychiatric/Behavioral: Positive for confusion.     Objective:     Vital Signs (Most Recent):  Temp: 98.6 °F (37 °C) (07/01/18 1500)  Pulse: 72 (07/01/18 1122)  Resp: 18 (07/01/18 1500)  BP: 106/66 (07/01/18 1500)  SpO2: 99 % (07/01/18 1500) Vital Signs (24h Range):  Temp:  [98.2 °F (36.8 °C)-98.7 °F (37.1 °C)] 98.6 °F (37 °C)  Pulse:  [68-72] 72  Resp:  [16-18] 18  SpO2:  [97 %-99 %] 99 %  BP: (103-113)/(53-68) 106/66     Weight: (!) 137 kg (302 lb 0.5 oz)  Body mass index is 38.78 kg/m².    Intake/Output - Last 3 Shifts       06/29 0700 - 06/30 0659 06/30 0700 - 07/01 0659 07/01 0700 - 07/02 0659    P.O.  990 2241 590    I.V. (mL/kg) 100 (0.7)      IV Piggyback 250      Total Intake(mL/kg) 1340 (9.8) 2241 (16.4) 590 (4.3)    Urine (mL/kg/hr) 525 (0.2) 965 (0.3) 750 (0.4)    Total Output 525 965 750    Net +815 +1276 -160           Urine Occurrence 4 x 2 x     Stool Occurrence 4 x 4 x 4 x          Physical Exam   Constitutional: He is oriented to person, place, and time. He appears well-developed and well-nourished. No distress.   Temporal and distal extremity muscle wasting   HENT:   Head: Normocephalic and atraumatic.   Mouth/Throat: Oropharynx is clear and moist. No oropharyngeal exudate.   Several broken teeth; poor dentition   Eyes: Conjunctivae are normal. Pupils are equal, round, and reactive to light. Scleral icterus is present.   Neck: Normal range of motion. Neck supple. No thyromegaly present.   Cardiovascular: Normal rate, regular rhythm and normal heart sounds.  Exam reveals no gallop and no friction rub.    No murmur heard.  Pulmonary/Chest: Effort normal and breath sounds normal. No respiratory distress. He has no wheezes. He has no rales.   Abdominal: Soft. Bowel sounds are normal. He exhibits no distension. There is no tenderness.   Musculoskeletal: Normal range of motion. He exhibits edema (2+ BLE).   Lymphadenopathy:     He has no cervical adenopathy.   Neurological: He is alert and oriented to person, place, and time. No cranial nerve deficit.   No asterixis   Skin: Skin is warm and dry. He is not diaphoretic. No erythema.   BLE crackling, scaly skin   Psychiatric: He has a normal mood and affect. His behavior is normal.   + confusion though improved   Nursing note and vitals reviewed.      Laboratory:  Immunosuppressants     None        Labs within the past 24 hours have been reviewed.    Diagnostic Results:  I have personally reviewed all pertinent imaging studies.

## 2018-07-02 NOTE — ASSESSMENT & PLAN NOTE
- Acute on chronic- at baseline mental status at this time.  - improved from grade 3 with lactulose enemas 6/25 + 6/26.  - continue rifaximin 550 mg BID.  - infx workup initiated: blood/urine/ascites.  - Continue abx: cefepime/vanc --> pt with G+C in blood, coag neg staph- likely contaminate   - but in setting of encephalopathy and ?LE cellulitis continue abx x7 days              - Patient has completed antimicrobial therapy on 7/1.

## 2018-07-02 NOTE — PROGRESS NOTES
[facility]  Liver Transplant  Progress Note    Patient Name: Alon Adamson  MRN: [id number]  Admission Date: 6/22/2018  Hospital Length of Stay: 9 days  Code Status: Full Code  Primary Care Provider: Mckinley Vides MD    Subjective:     History of Present Illness:  Mr. Adamson is a 64 yo M with PMH ESLD secondary to ETOH cirrhosis, CKD3, CAD.  Complications of liver disease include hyperbilirubinemia, hypoalbuminemia, severe malnutrition,   hepatic encephalopathy, thrombocytopenia, splenomegaly, ascites, hypervolemia, coagulopathy, portal HTN.  Pt recently admitted for severe hepatic encephalopathy requiring intubation but has been stable from mentation standpoint post resolution of acute enceophalopathy.      Mr. Adamson was seen in clinic 6/22 and was noted to have significant hypervolemia, weeping from BLE, and VICKY on CKD3 on routine labs.  His MELD increased from 22 to 26 per labs- coordinator notified and MELD updated.  Cr elevated to 2.3 from baseline of 1.4.  Also with worsening ascites.  Pt reported increased pruritis, generalized fatigue and weakness.  He presented in wheelchair.  ROS otherwise negative.      Pt started on IV diuretics 6/22 and continued 6/23 and 6/24.  BLE edema and ascites signif improved with diuresis and kidney function also improved.  Pt noted to have increased asterixis  6/24.  Upon admission, he was noted to have grade 1 hepatic encephalopathy which remained until 6/24 when grade 1-2 HE was noted.  Lactulose increased until pt had a BM- he had not had BM during previous day.  On 6/25 AM, pt with signif worsened encephalopathy opening eyes to verbal and tactile stimuli only.  Pt given lactulose enemas, infx workup sent, started on IV abx, hydrated with albumin, diuretics discontinued, CT head obtained and negative.  Paracentesis 6/25 negative for peritonitis per cell count and U/A negative. Pt also with concern for BLE cellulitis- upper legs bright red with lower  BLE still with dark appearance of venous stasis.  Placed on vancomycin with improvement in BLE redness 6/26.  Cr improved from 2/0 -> 1.9.  Pt began to improve but again returned to previous stated on 6/28.  Pt given more lactulose enemas and by 6/26 PM, was able to answer some orientation questions correctly which was a significant improvement.  Of note, taco placement attempted 6/26 but unable secondary to agitation upon insertion prompting self resolving nose bleed.  Micro lab called 6/26 PM with blood cx + for G+C in blood- pt continued on vanc which was started 6/25.   EEG obtained 6/26 as pt with h/o serizures and negative.  Pt continued on home Keppra.      Pt eating minimally during period of encephalopathy requiring gently hydration with IVF 6/26.  As previously stated, unable to place taco for nutrition.  VICKY initially improved with albumin + diuretics but increased again during period of encephalopathy.    Of note, with chest pain overnight 6/26-6/27 that resolved without intervention and pt reported as mild.  EKG NSR with PVC, troponin 0.1 in setting of hypervolemia + VICKY.  Normal dobutamine stress 1/2018.  Cardiology consulted and felt not ACS, no need for further workup other than continue to treat current risk factors for CAD with ASA (pt already on ASA and statin as outpt) + BB for portal hypertension which was started.      Hospital Course:  Interval hx: No acute events overnight.  Patient with depressed mood, tearful this morning. Reports that he is frustrated with diet and ready for discharge.  Initiated on cholestyramine and reports that he has used this previously without benefit.  Received po furosemide last night and this morning with small rise in creatinine.     Scheduled Meds:   aspirin  81 mg Oral Daily    ergocalciferol  50,000 Units Oral Q7 Days    fluconazole  200 mg Oral Daily    heparin (porcine)  5,000 Units Subcutaneous Q8H    lactulose  45 g Oral Q6H    levETIRAcetam  500 mg  Oral BID    lidocaine  2 patch Transdermal Q24H    lipase-protease-amylase 12,000-38,000-60,000 units  2 capsule Oral TID WM    omega-3 acid ethyl esters  2 g Oral Daily with breakfast    pantoprazole  40 mg Oral BID    propranolol  10 mg Oral BID    rifAXImin  550 mg Oral BID    sertraline  50 mg Oral Daily     Continuous Infusions:  PRN Meds:acetaminophen, diphenhydrAMINE-zinc acetate 1-0.1%, lactulose, ondansetron, sodium chloride 0.9%    Review of Systems   Constitutional: Positive for appetite change and fatigue. Negative for activity change, chills and unexpected weight change.   HENT: Negative for hearing loss and sore throat.    Eyes: Negative for visual disturbance.   Respiratory: Negative for shortness of breath.    Cardiovascular: Positive for leg swelling. Negative for chest pain.   Gastrointestinal: Negative for abdominal distention, abdominal pain, nausea and vomiting.   Musculoskeletal: Negative for gait problem.   Skin: Negative for rash.   Neurological: Negative for weakness and headaches.   Hematological: Negative for adenopathy. Does not bruise/bleed easily.   Psychiatric/Behavioral: Positive for confusion.     Objective:     Vital Signs (Most Recent):  Temp: 98.6 °F (37 °C) (07/01/18 1500)  Pulse: 72 (07/01/18 1122)  Resp: 18 (07/01/18 1500)  BP: 106/66 (07/01/18 1500)  SpO2: 99 % (07/01/18 1500) Vital Signs (24h Range):  Temp:  [98.2 °F (36.8 °C)-98.7 °F (37.1 °C)] 98.6 °F (37 °C)  Pulse:  [68-72] 72  Resp:  [16-18] 18  SpO2:  [97 %-99 %] 99 %  BP: (103-113)/(53-68) 106/66     Weight: (!) 137 kg (302 lb 0.5 oz)  Body mass index is 38.78 kg/m².    Intake/Output - Last 3 Shifts       06/29 0700 - 06/30 0659 06/30 0700 - 07/01 0659 07/01 0700 - 07/02 0659    P.O. 990 2241 590    I.V. (mL/kg) 100 (0.7)      IV Piggyback 250      Total Intake(mL/kg) 1340 (9.8) 2241 (16.4) 590 (4.3)    Urine (mL/kg/hr) 525 (0.2) 965 (0.3) 750 (0.4)    Total Output 525 965 750    Net +815 +1276 -160            Urine Occurrence 4 x 2 x     Stool Occurrence 4 x 4 x 4 x          Physical Exam   Constitutional: He is oriented to person, place, and time. He appears well-developed and well-nourished. No distress.   Temporal and distal extremity muscle wasting   HENT:   Head: Normocephalic and atraumatic.   Mouth/Throat: Oropharynx is clear and moist. No oropharyngeal exudate.   Several broken teeth; poor dentition   Eyes: Conjunctivae are normal. Pupils are equal, round, and reactive to light. Scleral icterus is present.   Neck: Normal range of motion. Neck supple. No thyromegaly present.   Cardiovascular: Normal rate, regular rhythm and normal heart sounds.  Exam reveals no gallop and no friction rub.    No murmur heard.  Pulmonary/Chest: Effort normal and breath sounds normal. No respiratory distress. He has no wheezes. He has no rales.   Abdominal: Soft. Bowel sounds are normal. He exhibits no distension. There is no tenderness.   Musculoskeletal: Normal range of motion. He exhibits edema (2+ BLE).   Lymphadenopathy:     He has no cervical adenopathy.   Neurological: He is alert and oriented to person, place, and time. No cranial nerve deficit.   No asterixis   Skin: Skin is warm and dry. He is not diaphoretic. No erythema.   BLE crackling, scaly skin   Psychiatric: He has a normal mood and affect. His behavior is normal.   + confusion though improved   Nursing note and vitals reviewed.      Laboratory:  Immunosuppressants     None        Labs within the past 24 hours have been reviewed.    Diagnostic Results:  I have personally reviewed all pertinent imaging studies.    Assessment/Plan:     * Acute kidney injury superimposed on chronic kidney disease    - VICKY initially improving with albumin infusion but with minimal intake 6/25 and 6/26.  - Ur Na: 32, unlikely for HRS.  - Creatinine slightly improved with albumin and lasix on 6/27.  - creatinine slightly increased to 1.9 today          Gram-positive bacteremia    - Vanc  "started 6/25  - Initial + cx 6/25  - Repeat cx 6/27, NGTD  - ID consulted 6/27 for tx course + any additional recs  - pt with G+C in blood from 6/25, coag neg staph- likely contaminate   - but in setting of encephalopathy and ?LE cellulitis continue abx x7 days (through 7/1/18)        Dermatitis associated with moisture              Cellulitis of both lower extremities    - Cont vanc x7 days (through 7/1/18) --> erythema signif improved.  - Monitor.          Physical deconditioning    - PT consulted.  - encourage OOBTC.          Ascites due to alcoholic cirrhosis    - Para 6/25 negative for infection.          Coagulopathy    - Secondary to decompensated liver disease.          CKD (chronic kidney disease), stage III    Creatinine 1.9 today        Cholestatic pruritus    - cholestyramine has previously been ineffective, given depressed mood; will initiate SSRI for mood and pruritus          Hypoalbuminemia due to protein-calorie malnutrition    - Dietary consulted for calorie count.  - Pt eating well today and boost supplement ordered.  - "pre-hab" regimen and supplements ordered.          Decompensated hepatic cirrhosis    - Pt on hold for insurance issues and severe malnutrition/deconditioned status + currently bacteremic.  Should re-evaluate for Monday as wife reports pharmacy issues resolved and albumin now improved with use of pre-hab regimen    MELD-Na score: 25 at 7/1/2018  4:14 AM  MELD score: 22 at 7/1/2018  4:14 AM  Calculated from:  Serum Creatinine: 1.9 mg/dL at 7/1/2018  4:14 AM  Serum Sodium: 132 mmol/L at 7/1/2018  4:14 AM  Total Bilirubin: 2.1 mg/dL at 7/1/2018  4:14 AM  INR(ratio): 1.8 at 7/1/2018  4:14 AM  Age: 63 years            Coronary artery disease involving native coronary artery of native heart without angina pectoris    - Continue home ASA.          Anemia of chronic disease    - H&H improved today.  - 1 unit PRBC transfused 6/28.           Thrombocytopenia    - Secondary to splenomegaly " from portal HTN- should improve with txp.  - No s/s overt bleed.          Seizures    - Oral Keppra transitioned to IV as pt not able to swallow 6/26.  - Resumed oral Keppra since mentation improved.  - EEG 6/26 negative.        Bilateral edema of lower extremity    Will hold furosemide tomorrow due to rise in creatinine to 1.9          Hepatic encephalopathy    - Acute on chronic- at baseline mental status at this time  - improved from grade 3 with lactulose enemas 6/25 + 6/26.  - continue rifaximin 550 mg BID.  - infx workup initiated: blood/urine/ascites.  - Continue abx: cefepime/vanc --> pt with G+C in blood, coag neg staph- likely contaminate   - but in setting of encephalopathy and ?LE cellulitis continue abx x7 days               Patient has completed antimicrobial therapy               VTE Risk Mitigation         Ordered     heparin (porcine) injection 5,000 Units  Every 8 hours      06/22/18 1502     IP VTE HIGH RISK PATIENT  Once      06/22/18 1502          The patients clinical status was discussed at multidisplinary rounds, involving transplant surgery, transplant medicine, pharmacy, nursing, nutrition, and social work    Discharge Planning:  Not stable for discharge at this time    Milvia Wen MD  Liver Transplant  [facility]

## 2018-07-02 NOTE — ASSESSMENT & PLAN NOTE
- Pt on hold for insurance issues and severe malnutrition/deconditioned status + currently bacteremic.  Should re-evaluate for Monday as wife reports pharmacy issues resolved and albumin now improved with use of pre-hab regimen    MELD-Na score: 24 at 7/2/2018  3:46 AM  MELD score: 21 at 7/2/2018  3:46 AM  Calculated from:  Serum Creatinine: 1.8 mg/dL at 7/2/2018  3:46 AM  Serum Sodium: 132 mmol/L at 7/2/2018  3:46 AM  Total Bilirubin: 2.7 mg/dL at 7/2/2018  3:46 AM  INR(ratio): 1.6 at 7/2/2018  3:46 AM  Age: 63 years

## 2018-07-02 NOTE — SUBJECTIVE & OBJECTIVE
Scheduled Meds:   aspirin  81 mg Oral Daily    ergocalciferol  50,000 Units Oral Q7 Days    fluconazole  200 mg Oral Daily    furosemide  40 mg Oral Daily    heparin (porcine)  5,000 Units Subcutaneous Q8H    lactulose  30 g Oral Q6H    levETIRAcetam  500 mg Oral BID    lidocaine  2 patch Transdermal Q24H    lipase-protease-amylase 12,000-38,000-60,000 units  2 capsule Oral TID WM    omega-3 acid ethyl esters  2 g Oral Daily with breakfast    pantoprazole  40 mg Oral BID    promethazine (PHENERGAN) IVPB  12.5 mg Intravenous Once    propranolol  5 mg Oral BID    rifAXImin  550 mg Oral BID    sertraline  50 mg Oral Daily     Continuous Infusions:  PRN Meds:acetaminophen, diphenhydrAMINE-zinc acetate 1-0.1%, lactulose, ondansetron, sodium chloride 0.9%    Review of Systems   Constitutional: Positive for appetite change and fatigue. Negative for activity change, chills, diaphoresis, fever and unexpected weight change.   HENT: Negative for congestion, facial swelling, hearing loss and sore throat.    Eyes: Negative for pain, discharge and visual disturbance.   Respiratory: Negative for cough, chest tightness, shortness of breath and wheezing.    Cardiovascular: Positive for leg swelling. Negative for chest pain and palpitations.   Gastrointestinal: Positive for diarrhea (with lactulose). Negative for abdominal distention, abdominal pain, blood in stool, constipation, nausea and vomiting.   Endocrine: Negative.    Genitourinary: Negative for decreased urine volume, dysuria and frequency.   Musculoskeletal: Negative for arthralgias and gait problem.   Skin: Negative for rash and wound.   Allergic/Immunologic: Positive for immunocompromised state.   Neurological: Positive for tremors. Negative for weakness and headaches.   Hematological: Negative.  Negative for adenopathy. Does not bruise/bleed easily.   Psychiatric/Behavioral: Positive for confusion.     Objective:     Vital Signs (Most Recent):  Temp: 97.5  °F (36.4 °C) (07/02/18 1214)  Pulse: 61 (07/02/18 1214)  Resp: 17 (07/02/18 1214)  BP: (!) 105/53 (07/02/18 1214)  SpO2: 99 % (07/02/18 1214) Vital Signs (24h Range):  Temp:  [97.5 °F (36.4 °C)-98.8 °F (37.1 °C)] 97.5 °F (36.4 °C)  Pulse:  [60-72] 61  Resp:  [16-18] 17  SpO2:  [96 %-99 %] 99 %  BP: ()/(43-66) 105/53     Weight: (!) 140.3 kg (309 lb 4.9 oz)  Body mass index is 39.71 kg/m².    Intake/Output - Last 3 Shifts       06/30 0700 - 07/01 0659 07/01 0700 - 07/02 0659 07/02 0700 - 07/03 0659    P.O. 2241 650     Total Intake(mL/kg) 2241 (16.4) 650 (4.6)     Urine (mL/kg/hr) 965 (0.3) 880 (0.3)     Emesis/NG output  300 (0.1)     Total Output 965 1180      Net +1276 -530             Urine Occurrence 2 x 1 x     Stool Occurrence 4 x 7 x     Emesis Occurrence  2 x           Physical Exam   Constitutional: He is oriented to person, place, and time. No distress.   Temporal and distal extremity muscle wasting   HENT:   Head: Normocephalic and atraumatic.   Mouth/Throat: No oropharyngeal exudate.   Several broken teeth; poor dentition   Eyes: Scleral icterus is present.   Cardiovascular: Normal rate, regular rhythm and normal heart sounds.    Pulmonary/Chest: Effort normal. He has rales (BLL).   Abdominal: Soft. Bowel sounds are normal. He exhibits distension (ascites). There is no tenderness.   Musculoskeletal: He exhibits edema (2+ BLE).   Neurological: He is alert and oriented to person, place, and time.   + asterixis   Skin: He is not diaphoretic. There is erythema.   BLE crackling, scaly skin   Psychiatric:   + confusion though improved   Nursing note and vitals reviewed.      Laboratory:  Immunosuppressants     None        CBC:   Recent Labs  Lab 07/02/18  0346   WBC 4.89   RBC 2.59*   HGB 8.6*   HCT 26.6*   PLT 49*   *   MCH 33.2*   MCHC 32.3     CMP:   Recent Labs  Lab 07/02/18 0346   *   CALCIUM 8.6*   ALBUMIN 2.6*   PROT 4.7*   *   K 4.2   CO2 16*      BUN 35*   CREATININE  1.8*   ALKPHOS 70   ALT 29   AST 47*   BILITOT 2.7*     Labs within the past 24 hours have been reviewed.    Diagnostic Results:  I have personally reviewed all pertinent imaging studies.

## 2018-07-02 NOTE — PT/OT/SLP PROGRESS
"Occupational Therapy   Treatment    Name: Alon Adamson  MRN: [id number]  Admitting Diagnosis:  Acute kidney injury superimposed on chronic kidney disease       Recommendations:     Discharge Recommendations: home health PT, home health OT  Discharge Equipment Recommendations:  none  Barriers to discharge:  None    Subjective     Communicated with: RN prior to session. Pt found with HOB elevated and son and mother present. Pt agreeable to therapy session.     Pt stated, " I cant stop scratching."     Pain/Comfort:  · Pain Rating 1:  (pt did not rate )  · Location - Side 1: Right  · Location - Orientation 1: anterior  · Location 1: shoulder  · Pain Addressed 1: Reposition, Distraction    Patients cultural, spiritual, Orthodox conflicts given the current situation: none stated     Objective:     Patient found with: telemetry    General Precautions: Standard, fall   Orthopedic Precautions:N/A   Braces: N/A     Occupational Performance:    Bed Mobility:    · Patient completed Supine to Sit with supervision and HOB elevated     Functional Mobility/Transfers:  · Patient completed Sit <> Stand Transfer from EOB with stand by assistance  with  rolling walker   · Patient completed Toilet Transfer functional ambulation from EOB > toilet with step transfer technique with stand by assistance with  grab bars and rolling walker  · Functional Mobility: Pt performed functional mobility simulating household distances within room ambulating from EOB > toilet > w/c with CGA initially progressing to SBA using RW.     Activities of Daily Living:  · Grooming: stand by assistance Pt completed hand hygiene standing at the sink.    · UB Dressing: minimum assistance to don gown like robe sitting EOB  · LB Dressing: total assistance to don B  socks  · Toileting: minimum assistance Pt required min A for gown management and SBA for hygiene completed in seated position. Pt required set-up A to void in urinal seated on toilet. "     Patient left up in wheelchair with call button in reach, RN notified and son and mother present.     [facility] 6 Click:  [facility] Total Score: 17    Treatment & Education:  Pt educated by therapist on:   - Pt educated on role of OT, POC, and goals for therapy.    - Increased time required for toileting.   - Redirection provided to maintain sustained attention for task completion during therapy session.   - Educated pt on being appropriate to transfer with nsg and PCT  - Time provided for therapeutic counseling and discussion of health disposition.   - Importance of OOB ax's with staff member assistance and sitting OOB majority of day.   - Pt completed ADLs and functional mobility for treatment session as noted above   - Pt verbalized understanding. Pt expressed no further concerns/questions.  - whiteboard updated   Education:    Assessment:     Alon Adamson is a 63 y.o. male with a medical diagnosis of Acute kidney injury superimposed on chronic kidney disease.  He presents with performance deficits affecting function are weakness, impaired endurance, impaired self care skills, impaired functional mobilty, gait instability, impaired balance, decreased upper extremity function, decreased lower extremity function, decreased safety awareness, pain, impaired skin. Pt tolerated session well and demonstrated improved mobility this date. Pt required increased time for task completion with frequent verbal cueing for redirection. Pt overall required SBA <> (S) for bed mobility and SBA for functional mobility within pt's room for household distances. Pt will continue to benefit form skilled OT in order to address the previously stated deficits.     Rehab Prognosis:  good; patient would benefit from acute skilled OT services to address these deficits and reach maximum level of function.       Plan:     Patient to be seen 3 x/week to address the above listed problems via self-care/home management, therapeutic activities,  therapeutic exercises  · Plan of Care Expires: 07/28/18  · Plan of Care Reviewed with: patient, son, caregiver    This Plan of care has been discussed with the patient who was involved in its development and understands and is in agreement with the identified goals and treatment plan    GOALS:    Occupational Therapy Goals        Problem: Occupational Therapy Goal    Goal Priority Disciplines Outcome Interventions   Occupational Therapy Goal     OT, PT/OT Ongoing (interventions implemented as appropriate)    Description:  Goals to be met by: 7/13/18     Patient will increase functional independence with ADLs by performing:    Grooming while standing at sink with Stand-by Assistance. - MET 7/2  Toileting from toilet with Stand-by Assistance for hygiene and clothing management.  Toilet transfer to toilet with Stand-by Assistance.MET 7/2  Upper extremity exercise program x15 reps per handout, with assistance as needed.  Pt will engage in functional mobility to simulate household distances in order to maximize functional activity tolerance required for engagement in occupations of choice with SBA                            Time Tracking:     OT Date of Treatment: 07/02/18  OT Start Time: 0824  OT Stop Time: 0912  OT Total Time (min): 48 min    Billable Minutes:Self Care/Home Management 25  Therapeutic Activity 13    Abbey Barnard OT  7/2/2018

## 2018-07-02 NOTE — ASSESSMENT & PLAN NOTE
- Pt on hold for insurance issues and severe malnutrition/deconditioned status + currently bacteremic.  Should re-evaluate for Monday as wife reports pharmacy issues resolved and albumin now improved with use of pre-hab regimen    MELD-Na score: 25 at 7/1/2018  4:14 AM  MELD score: 22 at 7/1/2018  4:14 AM  Calculated from:  Serum Creatinine: 1.9 mg/dL at 7/1/2018  4:14 AM  Serum Sodium: 132 mmol/L at 7/1/2018  4:14 AM  Total Bilirubin: 2.1 mg/dL at 7/1/2018  4:14 AM  INR(ratio): 1.8 at 7/1/2018  4:14 AM  Age: 63 years

## 2018-07-02 NOTE — PT/OT/SLP PROGRESS
"Physical Therapy Treatment    Patient Name:  Alon Adamson   MRN:  [id number]    Recommendations:     Discharge Recommendations:  home health PT   Discharge Equipment Recommendations: none   Barriers to discharge: None    Assessment:     Alon Adamson is a 63 y.o. male admitted with a medical diagnosis of Acute kidney injury superimposed on chronic kidney disease.  He presents with the following impairments/functional limitations:  weakness, impaired endurance, impaired sensation, impaired self care skills, impaired functional mobilty, gait instability, impaired balance, decreased upper extremity function, decreased lower extremity function, decreased safety awareness, pain, edema, impaired skin, decreased ROM.  Pt is requesting HHPT through . [facility].  HEP established and handout provided today.  Pts family present to assist with compliance and proper technique.  Pt completed 6MWT, though testing indicates impairments in aerobic capacity.  Pt completed stair amb with CGA with use of RHR.  Pt will benefit from acute skilled PT services to address these deficits and reach maximum level of function.      Recent Surgery: * No surgery found *      Plan:     During this hospitalization, patient to be seen 4 x/week to address the above listed problems via gait training, therapeutic activities, therapeutic exercises, neuromuscular re-education  · Plan of Care Expires:  07/23/18   Plan of Care Reviewed with: patient, mother, son    Subjective     Communicated with nursing prior to session.  Patient found sitting in w/c upon PT entry to room, agreeable to treatment.      "Just in my shoulder."    Chief Complaint: B shoulder pain  Patient comments/goals: To improve endurance  Pain/Comfort:  · Pain Rating 1: 8/10  · Location - Side 1: Bilateral (L>R)  · Location 1:  (Sh/chest)  · Pain Addressed 1: Reposition, Distraction (Ed on posture and manual therapy)    Patients cultural, spiritual, Gnosticism conflicts given " the current situation: no    Objective:     Patient found with: peripheral IV, telemetry     General Precautions: Standard, fall   Orthopedic Precautions:N/A   Braces: N/A     Functional Mobility:  · Transfers:     · Sit to Stand:  stand by assistance with ed on hand placement, luz maria with sitting, pt perf from w/c 3xs  · Chair to Chair: stand by assistance with  rolling walker  using  Stand Pivot    · Gait: Pt amb 400', SBA, RW, followed by w/c, with intermittent standing rest breaks    · Balance: SBA: dynamic standing balance with AD    · Stairs:  Pt ascended/descended 7 stair(s) with No Assistive Device with right handrail with Contact Guard Assistance. With time taken for ed and demo on proper tech and safety with stair amb.        AM-PAC 6 CLICK MOBILITY  Turning over in bed (including adjusting bedclothes, sheets and blankets)?: 3  Sitting down on and standing up from a chair with arms (e.g., wheelchair, bedside commode, etc.): 3  Moving from lying on back to sitting on the side of the bed?: 3  Moving to and from a bed to a chair (including a wheelchair)?: 3  Need to walk in hospital room?: 3  Climbing 3-5 steps with a railing?: 3  Basic Mobility Total Score: 18       Therapeutic Activities and Exercises:   Whiteboard updated  Ankle pumps: 60 reps, in sit   LAQs: 20 reps each LE perf individually, in sit  Hip abd/add: 20 reps each LE perf individually, in sit  Hip flex: 40 reps, alt LEs, in sit  Sit-ups: 20 reps, in reclined sitting posture  6MWT: Pt amb 340', SBA, RW, 7/10 RPE following      Patient left up in chair with all lines intact, nursing notified and family present.    GOALS:    Physical Therapy Goals        Problem: Physical Therapy Goal    Goal Priority Disciplines Outcome Goal Variances Interventions   Physical Therapy Goal     PT/OT, PT Ongoing (interventions implemented as appropriate)     Description:  Goals to be met by: 7/8/18     Patient will increase functional independence with mobility by  performin. Supine to sit with Stand-by Assistance  2. Sit to stand transfer with Stand-by Assistance - met   2a. Sit to stand with S  3. Gait  x 100 feet with Contact Guard Assistance using Rolling Walker. - met   3a. Gait x200 ft with Supervision with RW - GOAL MET   4. Ascend/descend 5 stair with right Handrails Contact Guard Assistance. - GOAL MET   4a. Ascend/descend 5 stair with right Handrails with SBA  5. Lower extremity exercise program x 20 reps, with supervision, in order to increase LE strength and (I) with functional mobility.   6. Pt to perf 6MWT: amb 440', to demonstrate a clinically significant improvement in aerobic capacity.                             Time Tracking:     PT Received On: 18  PT Start Time: 1009     PT Stop Time: 1108  PT Total Time (min): 59 min     Billable Minutes: Gait Training 29 and Therapeutic Exercise 28    Treatment Type: Treatment        PTA Visit Number: 0     Danielle Monterroso, PT  2018

## 2018-07-03 ENCOUNTER — CONFERENCE (OUTPATIENT)
Dept: TRANSPLANT | Facility: CLINIC | Age: 63
End: 2018-07-03

## 2018-07-03 PROBLEM — L03.115 CELLULITIS OF BOTH LOWER EXTREMITIES: Status: RESOLVED | Noted: 2018-06-25 | Resolved: 2018-07-03

## 2018-07-03 PROBLEM — L03.116 CELLULITIS OF BOTH LOWER EXTREMITIES: Status: RESOLVED | Noted: 2018-06-25 | Resolved: 2018-07-03

## 2018-07-03 PROBLEM — L85.3 XEROSIS OF SKIN: Status: RESOLVED | Noted: 2018-06-25 | Resolved: 2018-07-03

## 2018-07-03 PROBLEM — R78.81 GRAM-POSITIVE BACTEREMIA: Status: RESOLVED | Noted: 2018-06-27 | Resolved: 2018-07-03

## 2018-07-03 LAB
ALBUMIN SERPL BCP-MCNC: 2.8 G/DL
ALP SERPL-CCNC: 79 U/L
ALT SERPL W/O P-5'-P-CCNC: 32 U/L
ANION GAP SERPL CALC-SCNC: 7 MMOL/L
AST SERPL-CCNC: 48 U/L
BASOPHILS # BLD AUTO: 0.05 K/UL
BASOPHILS NFR BLD: 1.3 %
BILIRUB SERPL-MCNC: 2.6 MG/DL
BUN SERPL-MCNC: 36 MG/DL
CALCIUM SERPL-MCNC: 8.8 MG/DL
CHLORIDE SERPL-SCNC: 110 MMOL/L
CO2 SERPL-SCNC: 18 MMOL/L
CREAT SERPL-MCNC: 2 MG/DL
DIFFERENTIAL METHOD: ABNORMAL
EOSINOPHIL # BLD AUTO: 0.5 K/UL
EOSINOPHIL NFR BLD: 14.3 %
ERYTHROCYTE [DISTWIDTH] IN BLOOD BY AUTOMATED COUNT: 18.1 %
EST. GFR  (AFRICAN AMERICAN): 40.2 ML/MIN/1.73 M^2
EST. GFR  (NON AFRICAN AMERICAN): 34.7 ML/MIN/1.73 M^2
GLUCOSE SERPL-MCNC: 174 MG/DL
HCT VFR BLD AUTO: 26.1 %
HGB BLD-MCNC: 9 G/DL
IMM GRANULOCYTES # BLD AUTO: 0.03 K/UL
IMM GRANULOCYTES NFR BLD AUTO: 0.8 %
INR PPP: 1.6
LYMPHOCYTES # BLD AUTO: 0.4 K/UL
LYMPHOCYTES NFR BLD: 11.3 %
MAGNESIUM SERPL-MCNC: 2.1 MG/DL
MCH RBC QN AUTO: 34.1 PG
MCHC RBC AUTO-ENTMCNC: 34.5 G/DL
MCV RBC AUTO: 99 FL
MONOCYTES # BLD AUTO: 0.8 K/UL
MONOCYTES NFR BLD: 22.4 %
NEUTROPHILS # BLD AUTO: 1.9 K/UL
NEUTROPHILS NFR BLD: 49.9 %
NRBC BLD-RTO: 0 /100 WBC
PLATELET # BLD AUTO: 43 K/UL
PMV BLD AUTO: 12.6 FL
POTASSIUM SERPL-SCNC: 4.2 MMOL/L
PROT SERPL-MCNC: 4.8 G/DL
PROTHROMBIN TIME: 16.3 SEC
RBC # BLD AUTO: 2.64 M/UL
SODIUM SERPL-SCNC: 135 MMOL/L
WBC # BLD AUTO: 3.71 K/UL

## 2018-07-03 PROCEDURE — 63600175 PHARM REV CODE 636 W HCPCS: Mod: JG,NTX | Performed by: NURSE PRACTITIONER

## 2018-07-03 PROCEDURE — 85025 COMPLETE CBC W/AUTO DIFF WBC: CPT | Mod: NTX

## 2018-07-03 PROCEDURE — P9047 ALBUMIN (HUMAN), 25%, 50ML: HCPCS | Mod: JG,NTX | Performed by: NURSE PRACTITIONER

## 2018-07-03 PROCEDURE — 80053 COMPREHEN METABOLIC PANEL: CPT | Mod: NTX

## 2018-07-03 PROCEDURE — 83735 ASSAY OF MAGNESIUM: CPT | Mod: NTX

## 2018-07-03 PROCEDURE — 99233 SBSQ HOSP IP/OBS HIGH 50: CPT | Mod: NTX,,, | Performed by: NURSE PRACTITIONER

## 2018-07-03 PROCEDURE — 25000003 PHARM REV CODE 250: Mod: NTX | Performed by: NURSE PRACTITIONER

## 2018-07-03 PROCEDURE — 25000003 PHARM REV CODE 250: Mod: NTX | Performed by: INTERNAL MEDICINE

## 2018-07-03 PROCEDURE — 20600001 HC STEP DOWN PRIVATE ROOM: Mod: NTX

## 2018-07-03 PROCEDURE — 63600175 PHARM REV CODE 636 W HCPCS: Mod: NTX | Performed by: PHYSICIAN ASSISTANT

## 2018-07-03 PROCEDURE — 36415 COLL VENOUS BLD VENIPUNCTURE: CPT | Mod: NTX

## 2018-07-03 PROCEDURE — 25000003 PHARM REV CODE 250: Mod: NTX | Performed by: PHYSICIAN ASSISTANT

## 2018-07-03 PROCEDURE — 85610 PROTHROMBIN TIME: CPT | Mod: NTX

## 2018-07-03 RX ORDER — LACTULOSE 10 G/15ML
20 SOLUTION ORAL ONCE
Status: COMPLETED | OUTPATIENT
Start: 2018-07-03 | End: 2018-07-03

## 2018-07-03 RX ORDER — ALBUMIN HUMAN 250 G/1000ML
25 SOLUTION INTRAVENOUS ONCE
Status: COMPLETED | OUTPATIENT
Start: 2018-07-03 | End: 2018-07-03

## 2018-07-03 RX ORDER — LACTULOSE 10 G/15ML
45 SOLUTION ORAL 3 TIMES DAILY
Status: DISCONTINUED | OUTPATIENT
Start: 2018-07-03 | End: 2018-07-23

## 2018-07-03 RX ORDER — LACTULOSE 10 G/15ML
45 SOLUTION ORAL EVERY 6 HOURS PRN
Status: DISCONTINUED | OUTPATIENT
Start: 2018-07-03 | End: 2018-07-15

## 2018-07-03 RX ADMIN — LACTULOSE 45 G: 20 SOLUTION ORAL at 02:07

## 2018-07-03 RX ADMIN — PANTOPRAZOLE SODIUM 40 MG: 40 TABLET, DELAYED RELEASE ORAL at 09:07

## 2018-07-03 RX ADMIN — FLUCONAZOLE 200 MG: 200 TABLET ORAL at 09:07

## 2018-07-03 RX ADMIN — ONDANSETRON 8 MG: 8 TABLET, ORALLY DISINTEGRATING ORAL at 10:07

## 2018-07-03 RX ADMIN — ASPIRIN 81 MG: 81 TABLET, COATED ORAL at 09:07

## 2018-07-03 RX ADMIN — SERTRALINE HYDROCHLORIDE 50 MG: 50 TABLET ORAL at 09:07

## 2018-07-03 RX ADMIN — FUROSEMIDE 40 MG: 40 TABLET ORAL at 09:07

## 2018-07-03 RX ADMIN — HEPARIN SODIUM 5000 UNITS: 5000 INJECTION, SOLUTION INTRAVENOUS; SUBCUTANEOUS at 06:07

## 2018-07-03 RX ADMIN — HEPARIN SODIUM 5000 UNITS: 5000 INJECTION, SOLUTION INTRAVENOUS; SUBCUTANEOUS at 10:07

## 2018-07-03 RX ADMIN — PANCRELIPASE 2 CAPSULE: 60000; 12000; 38000 CAPSULE, DELAYED RELEASE PELLETS ORAL at 09:07

## 2018-07-03 RX ADMIN — LACTULOSE 30 G: 20 SOLUTION ORAL at 06:07

## 2018-07-03 RX ADMIN — LACTULOSE 45 G: 10 SOLUTION ORAL at 12:07

## 2018-07-03 RX ADMIN — RIFAXIMIN 550 MG: 550 TABLET ORAL at 10:07

## 2018-07-03 RX ADMIN — PANCRELIPASE 2 CAPSULE: 60000; 12000; 38000 CAPSULE, DELAYED RELEASE PELLETS ORAL at 05:07

## 2018-07-03 RX ADMIN — RIFAXIMIN 550 MG: 550 TABLET ORAL at 09:07

## 2018-07-03 RX ADMIN — LACTULOSE 45 G: 20 SOLUTION ORAL at 10:07

## 2018-07-03 RX ADMIN — PROPRANOLOL HYDROCHLORIDE 5 MG: 20 SOLUTION ORAL at 12:07

## 2018-07-03 RX ADMIN — LIDOCAINE 2 PATCH: 50 PATCH TOPICAL at 12:07

## 2018-07-03 RX ADMIN — ALBUMIN HUMAN 25 G: 0.25 SOLUTION INTRAVENOUS at 09:07

## 2018-07-03 RX ADMIN — HEPARIN SODIUM 5000 UNITS: 5000 INJECTION, SOLUTION INTRAVENOUS; SUBCUTANEOUS at 02:07

## 2018-07-03 RX ADMIN — LEVETIRACETAM 500 MG: 500 TABLET, FILM COATED ORAL at 10:07

## 2018-07-03 RX ADMIN — LEVETIRACETAM 500 MG: 500 TABLET, FILM COATED ORAL at 09:07

## 2018-07-03 RX ADMIN — PANTOPRAZOLE SODIUM 40 MG: 40 TABLET, DELAYED RELEASE ORAL at 10:07

## 2018-07-03 RX ADMIN — PROPRANOLOL HYDROCHLORIDE 5 MG: 20 SOLUTION ORAL at 10:07

## 2018-07-03 RX ADMIN — OMEGA-3-ACID ETHYL ESTERS 2 G: 1 CAPSULE, LIQUID FILLED ORAL at 02:07

## 2018-07-03 RX ADMIN — PANCRELIPASE 2 CAPSULE: 60000; 12000; 38000 CAPSULE, DELAYED RELEASE PELLETS ORAL at 12:07

## 2018-07-03 RX ADMIN — LACTULOSE 20 G: 20 SOLUTION ORAL at 12:07

## 2018-07-03 NOTE — ASSESSMENT & PLAN NOTE
- Acute on chronic  - improved from grade 3 with lactulose enemas 6/25 + 6/26.  - continue rifaximin 550 mg BID.  - infx workup initiated: blood/urine/ascites. Started on cefepime/vanc, d/c'd 7/1.   - adjusting lactulose regimen to prevent acute HE. Grade II HE this AM, improved with extra lactulose dose

## 2018-07-03 NOTE — PLAN OF CARE
Problem: Patient Care Overview  Goal: Plan of Care Review  Outcome: Ongoing (interventions implemented as appropriate)  Patient AAO this am, this afternoon slightly more sleepy than this am, only 2 bms noted this am, additional dose of lactulose given.  Benadryl cream given for itching.  Vitals stable. No vomit noted today, patient has had poor appetite but is able to drink shakes and protein powder. Reamins on hold for transplant.

## 2018-07-03 NOTE — PROGRESS NOTES
[facility]  Liver Transplant  Progress Note    Patient Name: Alon Adamson  MRN: [id number]  Admission Date: 6/22/2018  Hospital Length of Stay: 11 days  Code Status: Full Code  Primary Care Provider: Mckinley Vides MD    Subjective:     History of Present Illness:  Mr. 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  Complications of liver disease include hyperbilirubinemia, hypoalbuminemia, severe malnutrition,   hepatic encephalopathy, thrombocytopenia, splenomegaly, ascites, hypervolemia, coagulopathy, portal HTN.  Pt recently admitted for severe hepatic encephalopathy requiring intubation but has been stable from mentation standpoint post resolution of acute enceophalopathy. Mr. Adamson was seen in clinic 6/22 and was noted to have significant hypervolemia, weeping from BLE, and VICKY on CKD3 on routine labs.  His MELD increased from 22 to 26 per labs- coordinator notified and MELD updated.  Cr elevated to 2.3 from baseline of 1.4.  Also with worsening ascites.  Pt reported increased pruritis, generalized fatigue and weakness.  He presented in wheelchair.  ROS otherwise negative.      Hospital Course:  Patient is listed for liver transplant, placed on internal hold 6/25 for HE then status 7 on 6/26 due to change in prescription coverage. He was re-activated on the list 7/3, MELD 26 (expires 7/3). Pt started on IV diuretics 6/22 and continued 6/23 and 6/24.  BLE edema and ascites signif improved with diuresis and kidney function also improved.  Pt noted to have increased asterixis  6/24. Upon admission, he was noted to have grade 1 hepatic encephalopathy which remained until 6/24 when grade 1-2 HE was noted.  Lactulose increased until pt had a BM- he had not had BM during previous day. On 6/25 AM, pt with signif worsened encephalopathy opening eyes to verbal and tactile stimuli only.  Pt given lactulose enemas, infx workup sent, started on IV abx (ceftriaxone, vanc),  hydrated with albumin, diuretics discontinued, CT head obtained and negative. Paracentesis 6/25 negative for peritonitis per cell count and U/A negative. Pt also with concern for BLE cellulitis- upper legs bright red with lower BLE still with dark appearance of venous stasis. Placed on vancomycin with improvement in BLE redness 6/26. Vanc continued for 7 days (ended 7/1). His HE waxed and waned for several days requiring lactulose enemas. Pt eating minimally during period of encephalopathy requiring gentle hydration with IVF 6/26. Of note, taco placement attempted 6/26 but unable secondary to agitation upon insertion prompting self resolving nose bleed. Micro lab called 6/26 PM with blood cx + for G+C in blood- pt continued on vanc which was started 6/25. ID consulted, suspect + blood cultures contaminant. EEG obtained 6/26 as pt with h/o serizures and negative. Pt continued on home Keppra. VICKY on admission initially improved with diuresis, but Cr sakina intermittently during admission likely related to aggressive diuresis. Of note, with chest pain overnight 6/26-6/27 that resolved without intervention and pt reported as mild.  EKG NSR with PVC, troponin 0.1 in setting of hypervolemia + VICKY.  Normal dobutamine stress 1/2018.  Cardiology consulted and felt not ACS, no need for further workup other than continue to treat current risk factors for CAD with ASA (pt already on ASA and statin as outpt) + BB for portal hypertension which was started.     Interval History: no acute events overnight. Grade II HE this AM, lethargic, + asterixis, re-adjusted lactulose dose. Continues to work well with PT/OT, walking halls with walker. Continuing to improve po intake, drinking boost and protein shakes with protein powder. C/o mild nausea. Cr 2.0, continue lasix daily with albumin PRN.     Scheduled Meds:   aspirin  81 mg Oral Daily    ergocalciferol  50,000 Units Oral Q7 Days    fluconazole  200 mg Oral Daily    furosemide  40  mg Oral Daily    heparin (porcine)  5,000 Units Subcutaneous Q8H    lactulose  45 g Oral TID    levETIRAcetam  500 mg Oral BID    lidocaine  2 patch Transdermal Q24H    lipase-protease-amylase 12,000-38,000-60,000 units  2 capsule Oral TID WM    omega-3 acid ethyl esters  2 g Oral Daily with breakfast    pantoprazole  40 mg Oral BID    promethazine (PHENERGAN) IVPB  12.5 mg Intravenous Once    propranolol  5 mg Oral BID    rifAXImin  550 mg Oral BID    sertraline  50 mg Oral Daily     Continuous Infusions:  PRN Meds:acetaminophen, diphenhydrAMINE-zinc acetate 1-0.1%, lactulose, ondansetron, sodium chloride 0.9%    Review of Systems   Constitutional: Positive for appetite change and fatigue. Negative for activity change, chills, diaphoresis, fever and unexpected weight change.   HENT: Negative for congestion, facial swelling, hearing loss and sore throat.    Eyes: Negative for pain, discharge and visual disturbance.   Respiratory: Negative for cough, chest tightness, shortness of breath and wheezing.    Cardiovascular: Positive for leg swelling. Negative for chest pain and palpitations.   Gastrointestinal: Positive for diarrhea (with lactulose). Negative for abdominal distention, abdominal pain, blood in stool, constipation, nausea and vomiting.   Endocrine: Negative.    Genitourinary: Negative for decreased urine volume, dysuria and frequency.   Musculoskeletal: Negative for arthralgias and gait problem.   Skin: Negative for rash and wound.   Neurological: Positive for tremors. Negative for weakness and headaches.   Hematological: Negative.  Negative for adenopathy. Does not bruise/bleed easily.   Psychiatric/Behavioral: Positive for confusion.   All other systems reviewed and are negative.    Objective:     Vital Signs (Most Recent):  Temp: 98 °F (36.7 °C) (07/03/18 1139)  Pulse: 65 (07/03/18 1139)  Resp: 17 (07/03/18 1139)  BP: (!) 112/53 (07/03/18 1139)  SpO2: 97 % (07/03/18 1139) Vital Signs (24h  Range):  Temp:  [97.6 °F (36.4 °C)-98.7 °F (37.1 °C)] 98 °F (36.7 °C)  Pulse:  [60-65] 65  Resp:  [14-18] 17  SpO2:  [96 %-100 %] 97 %  BP: ()/(46-54) 112/53     Weight: (!) 140.3 kg (309 lb 4.9 oz)  Body mass index is 39.71 kg/m².    Intake/Output - Last 3 Shifts       07/01 0700 - 07/02 0659 07/02 0700 - 07/03 0659 07/03 0700 - 07/04 0659    P.O. 650 810 525    Total Intake(mL/kg) 650 (4.6) 810 (5.8) 525 (3.7)    Urine (mL/kg/hr) 880 (0.3) 300 (0.1)     Emesis/NG output 300 (0.1)      Total Output 1180 300      Net -530 +510 +525           Urine Occurrence 1 x 4 x     Stool Occurrence 7 x 5 x     Emesis Occurrence 2 x            Physical Exam   Constitutional: He is oriented to person, place, and time. He appears lethargic. He is cooperative. He is easily aroused. No distress.   Temporal and distal extremity muscle wasting   HENT:   Head: Normocephalic and atraumatic.   Mouth/Throat: No oropharyngeal exudate.   Several broken teeth; poor dentition   Eyes: Scleral icterus is present.   Cardiovascular: Normal rate, regular rhythm, normal heart sounds, intact distal pulses and normal pulses.    Pulmonary/Chest: Effort normal. No respiratory distress. He has decreased breath sounds in the right lower field and the left lower field. He has no rales.   Abdominal: Soft. Bowel sounds are normal. He exhibits distension (ascites). There is no tenderness.   Musculoskeletal: He exhibits edema (2+ BLE).   Neurological: He is oriented to person, place, and time and easily aroused. He appears lethargic.   + asterixis   Skin: Skin is warm, dry and intact. He is not diaphoretic. There is erythema.   BLE crackling, scaly skin   Psychiatric:   + AAOx4, lethargic   Nursing note and vitals reviewed.      Laboratory:  Immunosuppressants     None        CBC:   Recent Labs  Lab 07/03/18  0411   WBC 3.71*   RBC 2.64*   HGB 9.0*   HCT 26.1*   PLT 43*   MCV 99*   MCH 34.1*   MCHC 34.5     CMP:   Recent Labs  Lab 07/03/18  0411   GLU  "174*   CALCIUM 8.8   ALBUMIN 2.8*   PROT 4.8*   *   K 4.2   CO2 18*      BUN 36*   CREATININE 2.0*   ALKPHOS 79   ALT 32   AST 48*   BILITOT 2.6*     Coagulation:   Recent Labs  Lab 07/03/18  0411   INR 1.6*     Labs within the past 24 hours have been reviewed.    Diagnostic Results:  I have personally reviewed all pertinent imaging studies.    Assessment/Plan:     * Acute kidney injury superimposed on chronic kidney disease    - VICKY initially improving with albumin infusion but with minimal intake 6/25 and 6/26.  - Ur Na: 32, unlikely for HRS.  - Cr stable, continue lasix 40mg daily and albumin PRN, albumin for 1 dose today        Decompensated hepatic cirrhosis    - Placed status 7 on 6/26 for prescription drug coverage change. Re-activated 7/3, MELD 26 (expires 7/3)  - propanolol decreased to 5mg BID, monitor BP closely    MELD-Na score: 23 at 7/3/2018  4:11 AM  MELD score: 22 at 7/3/2018  4:11 AM  Calculated from:  Serum Creatinine: 2 mg/dL at 7/3/2018  4:11 AM  Serum Sodium: 135 mmol/L at 7/3/2018  4:11 AM  Total Bilirubin: 2.6 mg/dL at 7/3/2018  4:11 AM  INR(ratio): 1.6 at 7/3/2018  4:11 AM  Age: 63 years        Hepatic encephalopathy    - Acute on chronic  - improved from grade 3 with lactulose enemas 6/25 + 6/26.  - continue rifaximin 550 mg BID.  - infx workup initiated: blood/urine/ascites. Started on cefepime/vanc, d/c'd 7/1.   - adjusting lactulose regimen to prevent acute HE. Grade II HE this AM, improved with extra lactulose dose        Hypoalbuminemia due to protein-calorie malnutrition    - Dietary consulted for calorie count.  - Pt eating well today and boost supplement ordered.  - "pre-hab" regimen and supplements ordered.   - Patient drinking at least 2-3 boost supplements, protein shake, and benoprotein daily.         Dermatitis associated with moisture              Physical deconditioning    - PT consulted.  - encourage OOBTC. Walking in halls with walker.         Ascites due to " alcoholic cirrhosis    - Para 6/25 negative for infection.        Coagulopathy    - Secondary to decompensated liver disease.        CKD (chronic kidney disease), stage III    - see acute kidney injury superimposed on CKD III        Cholestatic pruritus    - cholestyramine has previously been ineffective, given depressed mood; will initiate zoloft for mood and pruritus.        Pancytopenia    - related to decompensated liver disease        Coronary artery disease involving native coronary artery of native heart without angina pectoris    - Continue home ASA.        Anemia of chronic disease    - H&H stable.  - 1 unit PRBC transfused 6/28.         Thrombocytopenia    - Secondary to splenomegaly from portal HTN- should improve with txp.  - No s/s overt bleed.        Seizures    - Oral Keppra transitioned to IV as pt not able to swallow 6/26.  - Resumed oral Keppra since mentation improved.  - EEG 6/26 negative.        Bilateral edema of lower extremity    - completed vanc x 7 days for BLE cellulitis  - Cr elevated but stable.   - edema improved, continue with gentle diuresis, albumin PRN            VTE Risk Mitigation         Ordered     heparin (porcine) injection 5,000 Units  Every 8 hours      06/22/18 1502     IP VTE HIGH RISK PATIENT  Once      06/22/18 1502          The patients clinical status was discussed at multidisplinary rounds, involving transplant surgery, transplant medicine, pharmacy, nursing, nutrition, and social work    Discharge Planning: not stable for discharge at this time.    Rissa Regalado DNP  Liver Transplant  [facility]

## 2018-07-03 NOTE — PT/OT/SLP PROGRESS
Physical Therapy      Patient Name:  Alon Adamson   MRN:  [id number]    Patient not seen today secondary to fatigue.  Attempted to visit pt in AM - pt asleep and lethargic upon arousal;  PM - pt's son request to hold therapy as pt still unable to participate d/t fatigue.  Will follow-up next scheduled date.    Mckinley Senior, PT   7/3/2018

## 2018-07-03 NOTE — ASSESSMENT & PLAN NOTE
- VICKY initially improving with albumin infusion but with minimal intake 6/25 and 6/26.  - Ur Na: 32, unlikely for HRS.  - Cr stable, continue lasix 40mg daily and albumin PRN, albumin for 1 dose today

## 2018-07-03 NOTE — ASSESSMENT & PLAN NOTE
- cholestyramine has previously been ineffective, given depressed mood; will initiate zoloft for mood and pruritus.

## 2018-07-03 NOTE — ASSESSMENT & PLAN NOTE
- Placed status 7 on 6/26 for prescription drug coverage change. Re-activated 7/3, MELD 26 (expires 7/3)  - propanolol decreased to 5mg BID, monitor BP closely    MELD-Na score: 23 at 7/3/2018  4:11 AM  MELD score: 22 at 7/3/2018  4:11 AM  Calculated from:  Serum Creatinine: 2 mg/dL at 7/3/2018  4:11 AM  Serum Sodium: 135 mmol/L at 7/3/2018  4:11 AM  Total Bilirubin: 2.6 mg/dL at 7/3/2018  4:11 AM  INR(ratio): 1.6 at 7/3/2018  4:11 AM  Age: 63 years

## 2018-07-03 NOTE — ASSESSMENT & PLAN NOTE
- completed vanc x 7 days for BLE cellulitis  - Cr elevated but stable.   - edema improved, continue with gentle diuresis, albumin PRN

## 2018-07-03 NOTE — ASSESSMENT & PLAN NOTE
"- Dietary consulted for calorie count.  - Pt eating well today and boost supplement ordered.  - "pre-hab" regimen and supplements ordered.   - Patient drinking at least 2-3 boost supplements, protein shake, and benoprotein daily.   "

## 2018-07-03 NOTE — SUBJECTIVE & OBJECTIVE
Scheduled Meds:   aspirin  81 mg Oral Daily    ergocalciferol  50,000 Units Oral Q7 Days    fluconazole  200 mg Oral Daily    furosemide  40 mg Oral Daily    heparin (porcine)  5,000 Units Subcutaneous Q8H    lactulose  45 g Oral TID    levETIRAcetam  500 mg Oral BID    lidocaine  2 patch Transdermal Q24H    lipase-protease-amylase 12,000-38,000-60,000 units  2 capsule Oral TID WM    omega-3 acid ethyl esters  2 g Oral Daily with breakfast    pantoprazole  40 mg Oral BID    promethazine (PHENERGAN) IVPB  12.5 mg Intravenous Once    propranolol  5 mg Oral BID    rifAXImin  550 mg Oral BID    sertraline  50 mg Oral Daily     Continuous Infusions:  PRN Meds:acetaminophen, diphenhydrAMINE-zinc acetate 1-0.1%, lactulose, ondansetron, sodium chloride 0.9%    Review of Systems   Constitutional: Positive for appetite change and fatigue. Negative for activity change, chills, diaphoresis, fever and unexpected weight change.   HENT: Negative for congestion, facial swelling, hearing loss and sore throat.    Eyes: Negative for pain, discharge and visual disturbance.   Respiratory: Negative for cough, chest tightness, shortness of breath and wheezing.    Cardiovascular: Positive for leg swelling. Negative for chest pain and palpitations.   Gastrointestinal: Positive for diarrhea (with lactulose). Negative for abdominal distention, abdominal pain, blood in stool, constipation, nausea and vomiting.   Endocrine: Negative.    Genitourinary: Negative for decreased urine volume, dysuria and frequency.   Musculoskeletal: Negative for arthralgias and gait problem.   Skin: Negative for rash and wound.   Neurological: Positive for tremors. Negative for weakness and headaches.   Hematological: Negative.  Negative for adenopathy. Does not bruise/bleed easily.   Psychiatric/Behavioral: Positive for confusion.   All other systems reviewed and are negative.    Objective:     Vital Signs (Most Recent):  Temp: 98 °F (36.7 °C)  (07/03/18 1139)  Pulse: 65 (07/03/18 1139)  Resp: 17 (07/03/18 1139)  BP: (!) 112/53 (07/03/18 1139)  SpO2: 97 % (07/03/18 1139) Vital Signs (24h Range):  Temp:  [97.6 °F (36.4 °C)-98.7 °F (37.1 °C)] 98 °F (36.7 °C)  Pulse:  [60-65] 65  Resp:  [14-18] 17  SpO2:  [96 %-100 %] 97 %  BP: ()/(46-54) 112/53     Weight: (!) 140.3 kg (309 lb 4.9 oz)  Body mass index is 39.71 kg/m².    Intake/Output - Last 3 Shifts       07/01 0700 - 07/02 0659 07/02 0700 - 07/03 0659 07/03 0700 - 07/04 0659    P.O. 650 810 525    Total Intake(mL/kg) 650 (4.6) 810 (5.8) 525 (3.7)    Urine (mL/kg/hr) 880 (0.3) 300 (0.1)     Emesis/NG output 300 (0.1)      Total Output 1180 300      Net -530 +510 +525           Urine Occurrence 1 x 4 x     Stool Occurrence 7 x 5 x     Emesis Occurrence 2 x            Physical Exam   Constitutional: He is oriented to person, place, and time. He appears lethargic. He is cooperative. He is easily aroused. No distress.   Temporal and distal extremity muscle wasting   HENT:   Head: Normocephalic and atraumatic.   Mouth/Throat: No oropharyngeal exudate.   Several broken teeth; poor dentition   Eyes: Scleral icterus is present.   Cardiovascular: Normal rate, regular rhythm, normal heart sounds, intact distal pulses and normal pulses.    Pulmonary/Chest: Effort normal. No respiratory distress. He has decreased breath sounds in the right lower field and the left lower field. He has no rales.   Abdominal: Soft. Bowel sounds are normal. He exhibits distension (ascites). There is no tenderness.   Musculoskeletal: He exhibits edema (2+ BLE).   Neurological: He is oriented to person, place, and time and easily aroused. He appears lethargic.   + asterixis   Skin: Skin is warm, dry and intact. He is not diaphoretic. There is erythema.   BLE crackling, scaly skin   Psychiatric:   + AAOx4, lethargic   Nursing note and vitals reviewed.      Laboratory:  Immunosuppressants     None        CBC:   Recent Labs  Lab  07/03/18 0411   WBC 3.71*   RBC 2.64*   HGB 9.0*   HCT 26.1*   PLT 43*   MCV 99*   MCH 34.1*   MCHC 34.5     CMP:   Recent Labs  Lab 07/03/18 0411   *   CALCIUM 8.8   ALBUMIN 2.8*   PROT 4.8*   *   K 4.2   CO2 18*      BUN 36*   CREATININE 2.0*   ALKPHOS 79   ALT 32   AST 48*   BILITOT 2.6*     Coagulation:   Recent Labs  Lab 07/03/18 0411   INR 1.6*     Labs within the past 24 hours have been reviewed.    Diagnostic Results:  I have personally reviewed all pertinent imaging studies.

## 2018-07-04 ENCOUNTER — TELEPHONE (OUTPATIENT)
Dept: TRANSPLANT | Facility: CLINIC | Age: 63
End: 2018-07-04

## 2018-07-04 LAB
ALBUMIN SERPL BCP-MCNC: 2.8 G/DL
ALP SERPL-CCNC: 80 U/L
ALT SERPL W/O P-5'-P-CCNC: 33 U/L
ANION GAP SERPL CALC-SCNC: 9 MMOL/L
AST SERPL-CCNC: 51 U/L
BASOPHILS # BLD AUTO: 0.05 K/UL
BASOPHILS NFR BLD: 1.3 %
BILIRUB SERPL-MCNC: 2.6 MG/DL
BUN SERPL-MCNC: 38 MG/DL
CALCIUM SERPL-MCNC: 9.2 MG/DL
CHLORIDE SERPL-SCNC: 113 MMOL/L
CO2 SERPL-SCNC: 17 MMOL/L
CREAT SERPL-MCNC: 1.8 MG/DL
DIFFERENTIAL METHOD: ABNORMAL
EOSINOPHIL # BLD AUTO: 0.6 K/UL
EOSINOPHIL NFR BLD: 14.5 %
ERYTHROCYTE [DISTWIDTH] IN BLOOD BY AUTOMATED COUNT: 18.6 %
EST. GFR  (AFRICAN AMERICAN): 45.6 ML/MIN/1.73 M^2
EST. GFR  (NON AFRICAN AMERICAN): 39.5 ML/MIN/1.73 M^2
GLUCOSE SERPL-MCNC: 159 MG/DL
HCT VFR BLD AUTO: 26.2 %
HGB BLD-MCNC: 9 G/DL
IMM GRANULOCYTES # BLD AUTO: 0.02 K/UL
IMM GRANULOCYTES NFR BLD AUTO: 0.5 %
INR PPP: 1.6
LYMPHOCYTES # BLD AUTO: 0.6 K/UL
LYMPHOCYTES NFR BLD: 16.8 %
MAGNESIUM SERPL-MCNC: 2 MG/DL
MCH RBC QN AUTO: 34.4 PG
MCHC RBC AUTO-ENTMCNC: 34.4 G/DL
MCV RBC AUTO: 100 FL
MONOCYTES # BLD AUTO: 0.7 K/UL
MONOCYTES NFR BLD: 18.7 %
NEUTROPHILS # BLD AUTO: 1.8 K/UL
NEUTROPHILS NFR BLD: 48.2 %
NRBC BLD-RTO: 0 /100 WBC
PLATELET # BLD AUTO: 43 K/UL
PMV BLD AUTO: 13.5 FL
POTASSIUM SERPL-SCNC: 4.2 MMOL/L
PROT SERPL-MCNC: 4.9 G/DL
PROTHROMBIN TIME: 15.9 SEC
RBC # BLD AUTO: 2.62 M/UL
SODIUM SERPL-SCNC: 139 MMOL/L
WBC # BLD AUTO: 3.8 K/UL

## 2018-07-04 PROCEDURE — 25000003 PHARM REV CODE 250: Mod: NTX | Performed by: PHYSICIAN ASSISTANT

## 2018-07-04 PROCEDURE — 63600175 PHARM REV CODE 636 W HCPCS: Mod: NTX | Performed by: NURSE PRACTITIONER

## 2018-07-04 PROCEDURE — 85025 COMPLETE CBC W/AUTO DIFF WBC: CPT | Mod: NTX

## 2018-07-04 PROCEDURE — 99233 SBSQ HOSP IP/OBS HIGH 50: CPT | Mod: NTX,,, | Performed by: INTERNAL MEDICINE

## 2018-07-04 PROCEDURE — 25000003 PHARM REV CODE 250: Mod: NTX | Performed by: NURSE PRACTITIONER

## 2018-07-04 PROCEDURE — 80053 COMPREHEN METABOLIC PANEL: CPT | Mod: NTX

## 2018-07-04 PROCEDURE — 36415 COLL VENOUS BLD VENIPUNCTURE: CPT | Mod: NTX

## 2018-07-04 PROCEDURE — 25000003 PHARM REV CODE 250: Mod: NTX | Performed by: INTERNAL MEDICINE

## 2018-07-04 PROCEDURE — 85610 PROTHROMBIN TIME: CPT | Mod: NTX

## 2018-07-04 PROCEDURE — 20600001 HC STEP DOWN PRIVATE ROOM: Mod: NTX

## 2018-07-04 PROCEDURE — 63600175 PHARM REV CODE 636 W HCPCS: Mod: NTX | Performed by: STUDENT IN AN ORGANIZED HEALTH CARE EDUCATION/TRAINING PROGRAM

## 2018-07-04 PROCEDURE — 83735 ASSAY OF MAGNESIUM: CPT | Mod: NTX

## 2018-07-04 PROCEDURE — 63600175 PHARM REV CODE 636 W HCPCS: Mod: NTX | Performed by: PHYSICIAN ASSISTANT

## 2018-07-04 RX ORDER — SODIUM BICARBONATE 650 MG/1
1300 TABLET ORAL 2 TIMES DAILY
Status: DISCONTINUED | OUTPATIENT
Start: 2018-07-04 | End: 2018-07-06

## 2018-07-04 RX ORDER — METOCLOPRAMIDE HYDROCHLORIDE 5 MG/ML
10 INJECTION INTRAMUSCULAR; INTRAVENOUS EVERY 6 HOURS PRN
Status: DISCONTINUED | OUTPATIENT
Start: 2018-07-04 | End: 2018-07-21

## 2018-07-04 RX ORDER — ONDANSETRON 2 MG/ML
4 INJECTION INTRAMUSCULAR; INTRAVENOUS ONCE
Status: COMPLETED | OUTPATIENT
Start: 2018-07-04 | End: 2018-07-04

## 2018-07-04 RX ADMIN — SODIUM BICARBONATE 650 MG TABLET 1300 MG: at 10:07

## 2018-07-04 RX ADMIN — RIFAXIMIN 550 MG: 550 TABLET ORAL at 08:07

## 2018-07-04 RX ADMIN — HEPARIN SODIUM 5000 UNITS: 5000 INJECTION, SOLUTION INTRAVENOUS; SUBCUTANEOUS at 02:07

## 2018-07-04 RX ADMIN — FUROSEMIDE 40 MG: 40 TABLET ORAL at 08:07

## 2018-07-04 RX ADMIN — METOCLOPRAMIDE 10 MG: 5 INJECTION, SOLUTION INTRAMUSCULAR; INTRAVENOUS at 09:07

## 2018-07-04 RX ADMIN — ONDANSETRON 8 MG: 8 TABLET, ORALLY DISINTEGRATING ORAL at 04:07

## 2018-07-04 RX ADMIN — PANTOPRAZOLE SODIUM 40 MG: 40 TABLET, DELAYED RELEASE ORAL at 09:07

## 2018-07-04 RX ADMIN — ASPIRIN 81 MG: 81 TABLET, COATED ORAL at 08:07

## 2018-07-04 RX ADMIN — PROPRANOLOL HYDROCHLORIDE 5 MG: 20 SOLUTION ORAL at 09:07

## 2018-07-04 RX ADMIN — SERTRALINE HYDROCHLORIDE 50 MG: 50 TABLET ORAL at 08:07

## 2018-07-04 RX ADMIN — HEPARIN SODIUM 5000 UNITS: 5000 INJECTION, SOLUTION INTRAVENOUS; SUBCUTANEOUS at 05:07

## 2018-07-04 RX ADMIN — ONDANSETRON 4 MG: 2 INJECTION INTRAMUSCULAR; INTRAVENOUS at 05:07

## 2018-07-04 RX ADMIN — PROPRANOLOL HYDROCHLORIDE 5 MG: 20 SOLUTION ORAL at 08:07

## 2018-07-04 RX ADMIN — PANCRELIPASE 2 CAPSULE: 60000; 12000; 38000 CAPSULE, DELAYED RELEASE PELLETS ORAL at 08:07

## 2018-07-04 RX ADMIN — OMEGA-3-ACID ETHYL ESTERS 2 G: 1 CAPSULE, LIQUID FILLED ORAL at 08:07

## 2018-07-04 RX ADMIN — SODIUM BICARBONATE 650 MG TABLET 1300 MG: at 09:07

## 2018-07-04 RX ADMIN — LACTULOSE 45 G: 20 SOLUTION ORAL at 09:07

## 2018-07-04 RX ADMIN — HEPARIN SODIUM 5000 UNITS: 5000 INJECTION, SOLUTION INTRAVENOUS; SUBCUTANEOUS at 09:07

## 2018-07-04 RX ADMIN — RIFAXIMIN 550 MG: 550 TABLET ORAL at 09:07

## 2018-07-04 RX ADMIN — FLUCONAZOLE 200 MG: 200 TABLET ORAL at 08:07

## 2018-07-04 RX ADMIN — LACTULOSE 45 G: 20 SOLUTION ORAL at 08:07

## 2018-07-04 RX ADMIN — LIDOCAINE 2 PATCH: 50 PATCH TOPICAL at 10:07

## 2018-07-04 RX ADMIN — LEVETIRACETAM 500 MG: 500 TABLET, FILM COATED ORAL at 08:07

## 2018-07-04 RX ADMIN — LEVETIRACETAM 500 MG: 500 TABLET, FILM COATED ORAL at 09:07

## 2018-07-04 RX ADMIN — PANTOPRAZOLE SODIUM 40 MG: 40 TABLET, DELAYED RELEASE ORAL at 08:07

## 2018-07-04 RX ADMIN — PANCRELIPASE 2 CAPSULE: 60000; 12000; 38000 CAPSULE, DELAYED RELEASE PELLETS ORAL at 01:07

## 2018-07-04 RX ADMIN — LACTULOSE 45 G: 20 SOLUTION ORAL at 02:07

## 2018-07-04 NOTE — SUBJECTIVE & OBJECTIVE
Scheduled Meds:   aspirin  81 mg Oral Daily    ergocalciferol  50,000 Units Oral Q7 Days    fluconazole  200 mg Oral Daily    furosemide  40 mg Oral Daily    heparin (porcine)  5,000 Units Subcutaneous Q8H    lactulose  45 g Oral TID    levETIRAcetam  500 mg Oral BID    lidocaine  2 patch Transdermal Q24H    lipase-protease-amylase 12,000-38,000-60,000 units  2 capsule Oral TID WM    omega-3 acid ethyl esters  2 g Oral Daily with breakfast    pantoprazole  40 mg Oral BID    promethazine (PHENERGAN) IVPB  12.5 mg Intravenous Once    propranolol  5 mg Oral BID    rifAXImin  550 mg Oral BID    sertraline  50 mg Oral Daily    sodium bicarbonate  1,300 mg Oral BID     Continuous Infusions:  PRN Meds:acetaminophen, diphenhydrAMINE-zinc acetate 1-0.1%, lactulose, lactulose, ondansetron, sodium chloride 0.9%    Review of Systems   Constitutional: Positive for appetite change and fatigue. Negative for activity change, chills, diaphoresis, fever and unexpected weight change.   HENT: Negative for congestion, facial swelling, hearing loss and sore throat.    Eyes: Negative for pain, discharge and visual disturbance.   Respiratory: Negative for cough, chest tightness, shortness of breath and wheezing.    Cardiovascular: Positive for leg swelling. Negative for chest pain and palpitations.   Gastrointestinal: Positive for diarrhea (with lactulose). Negative for abdominal distention, abdominal pain, blood in stool, constipation, nausea and vomiting.   Endocrine: Negative.    Genitourinary: Negative for decreased urine volume, dysuria and frequency.   Musculoskeletal: Negative for arthralgias and gait problem.   Skin: Negative for rash and wound.   Neurological: Positive for tremors. Negative for weakness and headaches.   Hematological: Negative.  Negative for adenopathy. Does not bruise/bleed easily.   Psychiatric/Behavioral: Positive for confusion.   All other systems reviewed and are negative.    Objective:      Vital Signs (Most Recent):  Temp: 97.5 °F (36.4 °C) (07/04/18 1108)  Pulse: 91 (07/04/18 1108)  Resp: 16 (07/04/18 1108)  BP: (!) 125/58 (07/04/18 1108)  SpO2: 99 % (07/04/18 1108) Vital Signs (24h Range):  Temp:  [96.1 °F (35.6 °C)-98 °F (36.7 °C)] 97.5 °F (36.4 °C)  Pulse:  [55-91] 91  Resp:  [16-20] 16  SpO2:  [97 %-99 %] 99 %  BP: (100-136)/(53-64) 125/58     Weight: (!) 140.3 kg (309 lb 4.9 oz)  Body mass index is 39.71 kg/m².    Intake/Output - Last 3 Shifts       07/02 0700 - 07/03 0659 07/03 0700 - 07/04 0659 07/04 0700 - 07/05 0659    P.O. 810 885     Total Intake(mL/kg) 810 (5.8) 885 (6.3)     Urine (mL/kg/hr) 300 (0.1) 1200 (0.4)     Total Output 300 1200      Net +510 -315             Urine Occurrence 4 x 0 x     Stool Occurrence 5 x 4 x     Emesis Occurrence  0 x           Physical Exam   Constitutional: He is oriented to person, place, and time. He appears lethargic. He is cooperative. He is easily aroused. No distress.   Temporal and distal extremity muscle wasting   HENT:   Head: Normocephalic and atraumatic.   Mouth/Throat: No oropharyngeal exudate.   Several broken teeth; poor dentition   Eyes: Scleral icterus is present.   Cardiovascular: Normal rate, regular rhythm, normal heart sounds, intact distal pulses and normal pulses.    Pulmonary/Chest: Effort normal. No respiratory distress. He has decreased breath sounds in the right lower field and the left lower field. He has no rales.   Abdominal: Soft. Bowel sounds are normal. He exhibits distension (ascites). There is no tenderness.   Musculoskeletal: He exhibits edema (2+ BLE).   Neurological: He is oriented to person, place, and time and easily aroused. He appears lethargic.   + asterixis   Skin: Skin is warm, dry and intact. He is not diaphoretic. There is erythema.   BLE crackling, scaly skin   Psychiatric:   + AAOx4, lethargic   Nursing note and vitals reviewed.      Laboratory:  Immunosuppressants     None        CBC:     Recent  Labs  Lab 07/04/18 0410   WBC 3.80*   RBC 2.62*   HGB 9.0*   HCT 26.2*   PLT 43*   *   MCH 34.4*   MCHC 34.4     CMP:     Recent Labs  Lab 07/04/18 0411   *   CALCIUM 9.2   ALBUMIN 2.8*   PROT 4.9*      K 4.2   CO2 17*   *   BUN 38*   CREATININE 1.8*   ALKPHOS 80   ALT 33   AST 51*   BILITOT 2.6*     Coagulation:     Recent Labs  Lab 07/04/18 0410   INR 1.6*     Labs within the past 24 hours have been reviewed.    Diagnostic Results:  I have personally reviewed all pertinent imaging studies.Scheduled Meds:   aspirin  81 mg Oral Daily    ergocalciferol  50,000 Units Oral Q7 Days    fluconazole  200 mg Oral Daily    furosemide  40 mg Oral Daily    heparin (porcine)  5,000 Units Subcutaneous Q8H    lactulose  45 g Oral TID    levETIRAcetam  500 mg Oral BID    lidocaine  2 patch Transdermal Q24H    lipase-protease-amylase 12,000-38,000-60,000 units  2 capsule Oral TID WM    omega-3 acid ethyl esters  2 g Oral Daily with breakfast    pantoprazole  40 mg Oral BID    promethazine (PHENERGAN) IVPB  12.5 mg Intravenous Once    propranolol  5 mg Oral BID    rifAXImin  550 mg Oral BID    sertraline  50 mg Oral Daily     Continuous Infusions:  PRN Meds:acetaminophen, diphenhydrAMINE-zinc acetate 1-0.1%, lactulose, lactulose, ondansetron, sodium chloride 0.9%    Review of Systems   Constitutional: Positive for appetite change (wife reports, patient does not much), chills and fatigue. Negative for activity change and fever.   HENT: Negative for mouth sores, sore throat and trouble swallowing.    Eyes: Negative for visual disturbance.   Respiratory: Positive for shortness of breath (upon walking fast).    Cardiovascular: Positive for leg swelling. Negative for chest pain.   Endocrine: Negative for polydipsia and polyuria.   Genitourinary: Negative for decreased urine volume and difficulty urinating.   Musculoskeletal: Positive for arthralgias (both shoulders have been hurting for last 2  weeks, right > left).   Skin: Negative for rash.   Neurological: Positive for weakness. Negative for dizziness, syncope and headaches.   Hematological: Bruises/bleeds easily.   Psychiatric/Behavioral: Negative for agitation, confusion, hallucinations and sleep disturbance.     Objective:     Vital Signs (Most Recent):  Temp: 97.8 °F (36.6 °C) (07/04/18 0317)  Pulse: 60 (07/04/18 0317)  Resp: 20 (07/04/18 0317)  BP: 136/64 (07/04/18 0317)  SpO2: 98 % (07/04/18 0317) Vital Signs (24h Range):  Temp:  [96.1 °F (35.6 °C)-98.7 °F (37.1 °C)] 97.8 °F (36.6 °C)  Pulse:  [60-70] 60  Resp:  [16-20] 20  SpO2:  [96 %-98 %] 98 %  BP: (100-136)/(51-64) 136/64     Weight: (!) 140.3 kg (309 lb 4.9 oz)  Body mass index is 39.71 kg/m².    Intake/Output - Last 3 Shifts       07/02 0700 - 07/03 0659 07/03 0700 - 07/04 0659 07/04 0700 - 07/05 0659    P.O. 810 885     Total Intake(mL/kg) 810 (5.8) 885 (6.3)     Urine (mL/kg/hr) 300 (0.1) 1200 (0.4)     Total Output 300 1200      Net +510 -315             Urine Occurrence 4 x 0 x     Stool Occurrence 5 x 4 x     Emesis Occurrence  0 x           Physical Exam   Constitutional: He is oriented to person, place, and time. He appears well-developed and well-nourished.   HENT:   Head: Normocephalic and atraumatic.   Mouth/Throat: No oropharyngeal exudate.   Eyes: EOM are normal. Pupils are equal, round, and reactive to light. Scleral icterus is present.   Neck: No JVD present.   Cardiovascular: Exam reveals no gallop and no friction rub.    Murmur heard.  Pulmonary/Chest: Effort normal and breath sounds normal. No stridor. No respiratory distress. He has no wheezes. He has no rales.   Abdominal: Soft. Bowel sounds are normal. He exhibits distension. He exhibits no mass.   Musculoskeletal: He exhibits edema. He exhibits no tenderness.   Lymphadenopathy:     He has no cervical adenopathy.   Neurological: He is alert and oriented to person, place, and time.   Skin: Skin is warm and dry. No rash  noted. No erythema.   Psychiatric: He has a normal mood and affect. His behavior is normal.       Laboratory:  Immunosuppressants     None        CBC:   Recent Labs  Lab 18   WBC 3.80*   RBC 2.62*   HGB 9.0*   HCT 26.2*   PLT 43*   *   MCH 34.4*   MCHC 34.4     CMP:   Recent Labs  Lab 18   *   CALCIUM 9.2   ALBUMIN 2.8*   PROT 4.9*      K 4.2   CO2 17*   *   BUN 38*   CREATININE 1.8*   ALKPHOS 80   ALT 33   AST 51*   BILITOT 2.6*     Coagulation:   Recent Labs  Lab 18   INR 1.6*     Labs within the past 24 hours have been reviewed.    MELD-Na score: 21 at 2018  4:11 AM  MELD score: 21 at 2018  4:11 AM  Calculated from:  Serum Creatinine: 1.8 mg/dL at 2018  4:11 AM  Serum Sodium: 139 mmol/L (Rounded to 137) at 2018  4:11 AM  Total Bilirubin: 2.6 mg/dL at 2018  4:11 AM  INR(ratio): 1.6 at 2018  4:10 AM  Age: 63 years    Will update MELD with 21 today as previous MELD of 26  yesterday.     Diagnostic Results:  US Liver Transplant Post:  No results found for this or any previous visit.  None

## 2018-07-04 NOTE — ASSESSMENT & PLAN NOTE
- Acute on chronic  - improved from grade 3 to 1-2 with lactulose enemas 6/25 + 6/26, followed by oral lactulose, xifaxan.  - continue rifaximin 550 mg BID.  - infx workup initiated: blood/urine/ascites. Started on cefepime/vanc, d/c'd 7/1. Blood Cultures 6/27 are neg to date.   - adjusting lactulose regimen to prevent acute HE. Grade II HE this AM, improved with extra lactulose dose.

## 2018-07-04 NOTE — ASSESSMENT & PLAN NOTE
"- Dietary consulted for calorie count.  - Pt eating poorly accord to wife.  Does take boost supplement.  - "pre-hab" regimen and supplements ordered.   - Patient drinking at least 2-3 boost supplements, protein shake, and benoprotein daily.   - If continues to have low intake, start Megace 40 mg tablet daily tomorrow.  "

## 2018-07-04 NOTE — ASSESSMENT & PLAN NOTE
- VICKY initially improving with albumin infusion but with minimal intake 6/25 and 6/26.  - Ur Na: 32, unlikely for HRS.  - Cr improved, continue lasix 40mg daily and albumin PRN, albumin for 1 dose today  - Bicarb 17, start Na Bicarb 1300 mg BID

## 2018-07-04 NOTE — TELEPHONE ENCOUNTER
PATIENT NAME: Alon Muller [facility] #: [id number]    Lab Results   Component Value Date    CREATININE 1.8 (H) 07/04/2018     07/04/2018    BILITOT 2.6 (H) 07/04/2018    ALBUMIN 2.8 (L) 07/04/2018    INR 1.6 (H) 07/04/2018       Encephalopathy: 1 - 2  Ascites: controlled  Dialysis: no     Recertification requestor: Waleska Roland

## 2018-07-04 NOTE — PLAN OF CARE
Problem: Patient Care Overview  Goal: Plan of Care Review  Outcome: Ongoing (interventions implemented as appropriate)  Pt AAO x4 throughout shift. Lactulose given per order. Family took patient outside for some fresh air and sun. Pt up with stand by assist throughout shift. Pt free from falls and injury throughout shift.

## 2018-07-04 NOTE — PROGRESS NOTES
[facility]  Liver Transplant  Progress Note    Patient Name: Alon Adamson  MRN: [id number]  Admission Date: 6/22/2018  Hospital Length of Stay: 12 days  Code Status: Full Code  Primary Care Provider: Mckinley Vides MD    Subjective:     History of Present Illness:  Mr. 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  Complications of liver disease include hyperbilirubinemia, hypoalbuminemia, severe malnutrition,   hepatic encephalopathy, thrombocytopenia, splenomegaly, ascites, hypervolemia, coagulopathy, portal HTN.  Pt recently admitted for severe hepatic encephalopathy requiring intubation but has been stable from mentation standpoint post resolution of acute enceophalopathy. Mr. Adamson was seen in clinic 6/22 and was noted to have significant hypervolemia, weeping from BLE, and VICKY on CKD3 on routine labs.  His MELD increased from 22 to 26 per labs- coordinator notified and MELD updated.  Cr elevated to 2.3 from baseline of 1.4.  Also with worsening ascites.  Pt reported increased pruritis, generalized fatigue and weakness.  He presented in wheelchair.  ROS otherwise negative.      Hospital Course:  Patient is listed for liver transplant, placed on internal hold 6/25 for HE then status 7 on 6/26 due to change in prescription coverage. He was re-activated on the list 7/3, MELD 26 (expires 7/3). Pt started on IV diuretics 6/22 and continued 6/23 and 6/24.  BLE edema and ascites signif improved with diuresis and kidney function also improved.  Pt noted to have increased asterixis  6/24. Upon admission, he was noted to have grade 1 hepatic encephalopathy which remained until 6/24 when grade 1-2 HE was noted.  Lactulose increased until pt had a BM- he had not had BM during previous day. On 6/25 AM, pt with signif worsened encephalopathy opening eyes to verbal and tactile stimuli only.  Pt given lactulose enemas, infx workup sent, started on IV abx (ceftriaxone, vanc),  hydrated with albumin, diuretics discontinued, CT head obtained and negative. Paracentesis 6/25 negative for peritonitis per cell count and U/A negative. Pt also with concern for BLE cellulitis- upper legs bright red with lower BLE still with dark appearance of venous stasis. Placed on vancomycin with improvement in BLE redness 6/26. Vanc continued for 7 days (ended 7/1). His HE waxed and waned for several days requiring lactulose enemas. Pt eating minimally during period of encephalopathy requiring gentle hydration with IVF 6/26. Of note, taco placement attempted 6/26 but unable secondary to agitation upon insertion prompting self resolving nose bleed. Micro lab called 6/26 PM with blood cx + for G+C in blood- pt continued on vanc which was started 6/25. ID consulted, suspect + blood cultures contaminant. EEG obtained 6/26 as pt with h/o serizures and negative. Pt continued on home Keppra. VICKY on admission initially improved with diuresis, but Cr sakina intermittently during admission likely related to aggressive diuresis. Of note, with chest pain overnight 6/26-6/27 that resolved without intervention and pt reported as mild.  EKG NSR with PVC, troponin 0.1 in setting of hypervolemia + VICKY.  Normal dobutamine stress 1/2018.  Cardiology consulted and felt not ACS, no need for further workup other than continue to treat current risk factors for CAD with ASA (pt already on ASA and statin as outpt) + BB for portal hypertension which was started.     Interval History: no acute events overnight. Grade II HE this AM, alert, + asterixis, re-adjusted lactulose dose. Continues to work well with PT/OT, walking halls with walker. Needs to improve po intake, drinking boost and protein shakes with protein powder.  If intake still poor today, may start Megace tomorrow.  Denies nausea/vomiting. Cr down to 1.8, continue lasix daily with albumin PRN.     Patient made active on the list based on conference note dated 7/3/18.  Also  "re-MELD today 21, as previous MELD 26  yesterday.       No new subjective & objective note has been filed under this hospital service since the last note was generated.    Assessment/Plan:     * Acute kidney injury superimposed on chronic kidney disease    - VICKY initially improving with albumin infusion but with minimal intake  and .  - Ur Na: 32, unlikely for HRS.  - Cr improved, continue lasix 40mg daily and albumin PRN, albumin for 1 dose today  - Bicarb 17, start Na Bicarb 1300 mg BID        Dermatitis associated with moisture              Physical deconditioning    - PT consulted.  - encourage OOBTC. Walking in halls with walker.         Ascites due to alcoholic cirrhosis    - Para  negative for infection.        Coagulopathy    - Secondary to decompensated liver disease.        CKD (chronic kidney disease), stage III    - see acute kidney injury superimposed on CKD III        Cholestatic pruritus    - cholestyramine has previously been ineffective, given depressed mood; will initiate zoloft for mood and pruritus.        Hypoalbuminemia due to protein-calorie malnutrition    - Dietary consulted for calorie count.  - Pt eating poorly accord to wife.  Does take boost supplement.  - "pre-hab" regimen and supplements ordered.   - Patient drinking at least 2-3 boost supplements, protein shake, and benoprotein daily.   - If continues to have low intake, start Megace 40 mg tablet daily tomorrow.        Pancytopenia    - related to decompensated liver disease        Decompensated hepatic cirrhosis    - Placed status 7 on  for prescription drug coverage change. Re-activated 7/3, MELD 26 ( 7/3), re-MELDed today 18 with 21.   - propanolol decreased to 5mg BID, monitor BP closely    MELD-Na score: 21 at 2018  4:11 AM  MELD score: 21 at 2018  4:11 AM  Calculated from:  Serum Creatinine: 1.8 mg/dL at 2018  4:11 AM  Serum Sodium: 139 mmol/L (Rounded to 137) at 2018  4:11 " AM  Total Bilirubin: 2.6 mg/dL at 7/4/2018  4:11 AM  INR(ratio): 1.6 at 7/4/2018  4:10 AM  Age: 63 years        Coronary artery disease involving native coronary artery of native heart without angina pectoris    - Continue home ASA.        Anemia of chronic disease    - H&H stable.  - 1 unit PRBC transfused 6/28.         Thrombocytopenia    - Secondary to splenomegaly from portal HTN- should improve with txp.  - No s/s overt bleed.        Seizures    - Oral Keppra transitioned to IV as pt not able to swallow 6/26.  - Resumed oral Keppra since mentation improved.  - EEG 6/26 negative.        Bilateral edema of lower extremity    - completed vanc x 7 days for BLE cellulitis  - Cr elevated but stable.   - edema improved, continue with gentle diuresis, albumin PRN        Hepatic encephalopathy    - Acute on chronic  - improved from grade 3 to 1-2 with lactulose enemas 6/25 + 6/26, followed by oral lactulose, xifaxan.  - continue rifaximin 550 mg BID.  - infx workup initiated: blood/urine/ascites. Started on cefepime/vanc, d/c'd 7/1. Blood Cultures 6/27 are neg to date.   - adjusting lactulose regimen to prevent acute HE. Grade II HE this AM, improved with extra lactulose dose.            VTE Risk Mitigation         Ordered     heparin (porcine) injection 5,000 Units  Every 8 hours      06/22/18 1502     IP VTE HIGH RISK PATIENT  Once      06/22/18 1502          The patients clinical status was discussed at multidisplinary rounds, involving transplant surgery, transplant medicine, pharmacy, nursing, nutrition, and social work    Discharge Planning:  No Patient Care Coordination Note on file.      Waleska Roland MD  Liver Transplant  [facility]

## 2018-07-04 NOTE — PROGRESS NOTES
[facility]  Liver Transplant  Progress Note    Patient Name: Alon Adamson  MRN: [id number]  Admission Date: 6/22/2018  Hospital Length of Stay: 12 days  Code Status: Full Code  Primary Care Provider: Mckinley Vides MD    Subjective:     History of Present Illness:  Mr. Adamson is a 64 yo M with PMH ESLD secondary to ETOH cirrhosis, CKD3, CAD.  Complications of liver disease include hyperbilirubinemia, hypoalbuminemia, severe malnutrition,   hepatic encephalopathy, thrombocytopenia, splenomegaly, ascites, hypervolemia, coagulopathy, portal HTN.  Pt recently admitted for severe hepatic encephalopathy requiring intubation but has been stable from mentation standpoint post resolution of acute enceophalopathy. Mr. Adamson was seen in clinic 6/22 and was noted to have significant hypervolemia, weeping from BLE, and VICKY on CKD3 on routine labs.  His MELD increased from 22 to 26 per labs- coordinator notified and MELD updated.  Cr elevated to 2.3 from baseline of 1.4.  Also with worsening ascites.  Pt reported increased pruritis, generalized fatigue and weakness.  He presented in wheelchair.  ROS otherwise negative.      Hospital Course:  Patient is listed for liver transplant, placed on internal hold 6/25 for HE then status 7 on 6/26 due to change in prescription coverage. He was re-activated on the list 7/3, MELD 26 (expires 7/3). Pt started on IV diuretics 6/22 and continued 6/23 and 6/24.  BLE edema and ascites signif improved with diuresis and kidney function also improved.  Pt noted to have increased asterixis  6/24. Upon admission, he was noted to have grade 1 hepatic encephalopathy which remained until 6/24 when grade 1-2 HE was noted.  Lactulose increased until pt had a BM- he had not had BM during previous day. On 6/25 AM, pt with signif worsened encephalopathy opening eyes to verbal and tactile stimuli only.  Pt given lactulose enemas, infx workup sent, started on IV abx (ceftriaxone, vanc),  hydrated with albumin, diuretics discontinued, CT head obtained and negative. Paracentesis 6/25 negative for peritonitis per cell count and U/A negative. Pt also with concern for BLE cellulitis- upper legs bright red with lower BLE still with dark appearance of venous stasis. Placed on vancomycin with improvement in BLE redness 6/26. Vanc continued for 7 days (ended 7/1). His HE waxed and waned for several days requiring lactulose enemas. Pt eating minimally during period of encephalopathy requiring gentle hydration with IVF 6/26. Of note, taco placement attempted 6/26 but unable secondary to agitation upon insertion prompting self resolving nose bleed. Micro lab called 6/26 PM with blood cx + for G+C in blood- pt continued on vanc which was started 6/25. ID consulted, suspect + blood cultures contaminant. EEG obtained 6/26 as pt with h/o serizures and negative. Pt continued on home Keppra. VICKY on admission initially improved with diuresis, but Cr sakina intermittently during admission likely related to aggressive diuresis. Of note, with chest pain overnight 6/26-6/27 that resolved without intervention and pt reported as mild.  EKG NSR with PVC, troponin 0.1 in setting of hypervolemia + VICKY.  Normal dobutamine stress 1/2018.  Cardiology consulted and felt not ACS, no need for further workup other than continue to treat current risk factors for CAD with ASA (pt already on ASA and statin as outpt) + BB for portal hypertension which was started.     Interval History: no acute events overnight. Grade II HE this AM, alert, + asterixis, re-adjusted lactulose dose. Continues to work well with PT/OT, walking halls with walker. Needs to improve po intake, drinking boost and protein shakes with protein powder.  If intake still poor today, may start Megace tomorrow.  Denies nausea/vomiting. Cr down to 1.8, continue lasix daily with albumin PRN.     Scheduled Meds:   aspirin  81 mg Oral Daily    ergocalciferol  50,000 Units Oral  Q7 Days    fluconazole  200 mg Oral Daily    furosemide  40 mg Oral Daily    heparin (porcine)  5,000 Units Subcutaneous Q8H    lactulose  45 g Oral TID    levETIRAcetam  500 mg Oral BID    lidocaine  2 patch Transdermal Q24H    lipase-protease-amylase 12,000-38,000-60,000 units  2 capsule Oral TID WM    omega-3 acid ethyl esters  2 g Oral Daily with breakfast    pantoprazole  40 mg Oral BID    promethazine (PHENERGAN) IVPB  12.5 mg Intravenous Once    propranolol  5 mg Oral BID    rifAXImin  550 mg Oral BID    sertraline  50 mg Oral Daily    sodium bicarbonate  1,300 mg Oral BID     Continuous Infusions:  PRN Meds:acetaminophen, diphenhydrAMINE-zinc acetate 1-0.1%, lactulose, lactulose, ondansetron, sodium chloride 0.9%    Review of Systems   Constitutional: Positive for appetite change and fatigue. Negative for activity change, chills, diaphoresis, fever and unexpected weight change.   HENT: Negative for congestion, facial swelling, hearing loss and sore throat.    Eyes: Negative for pain, discharge and visual disturbance.   Respiratory: Negative for cough, chest tightness, shortness of breath and wheezing.    Cardiovascular: Positive for leg swelling. Negative for chest pain and palpitations.   Gastrointestinal: Positive for diarrhea (with lactulose). Negative for abdominal distention, abdominal pain, blood in stool, constipation, nausea and vomiting.   Endocrine: Negative.    Genitourinary: Negative for decreased urine volume, dysuria and frequency.   Musculoskeletal: Negative for arthralgias and gait problem.   Skin: Negative for rash and wound.   Neurological: Positive for tremors. Negative for weakness and headaches.   Hematological: Negative.  Negative for adenopathy. Does not bruise/bleed easily.   Psychiatric/Behavioral: Positive for confusion.   All other systems reviewed and are negative.    Objective:     Vital Signs (Most Recent):  Temp: 97.5 °F (36.4 °C) (07/04/18 1108)  Pulse: 91  (07/04/18 1108)  Resp: 16 (07/04/18 1108)  BP: (!) 125/58 (07/04/18 1108)  SpO2: 99 % (07/04/18 1108) Vital Signs (24h Range):  Temp:  [96.1 °F (35.6 °C)-98 °F (36.7 °C)] 97.5 °F (36.4 °C)  Pulse:  [55-91] 91  Resp:  [16-20] 16  SpO2:  [97 %-99 %] 99 %  BP: (100-136)/(53-64) 125/58     Weight: (!) 140.3 kg (309 lb 4.9 oz)  Body mass index is 39.71 kg/m².    Intake/Output - Last 3 Shifts       07/02 0700 - 07/03 0659 07/03 0700 - 07/04 0659 07/04 0700 - 07/05 0659    P.O. 810 885     Total Intake(mL/kg) 810 (5.8) 885 (6.3)     Urine (mL/kg/hr) 300 (0.1) 1200 (0.4)     Total Output 300 1200      Net +510 -315             Urine Occurrence 4 x 0 x     Stool Occurrence 5 x 4 x     Emesis Occurrence  0 x           Physical Exam   Constitutional: He is oriented to person, place, and time. He appears lethargic. He is cooperative. He is easily aroused. No distress.   Temporal and distal extremity muscle wasting   HENT:   Head: Normocephalic and atraumatic.   Mouth/Throat: No oropharyngeal exudate.   Several broken teeth; poor dentition   Eyes: Scleral icterus is present.   Cardiovascular: Normal rate, regular rhythm, normal heart sounds, intact distal pulses and normal pulses.    Pulmonary/Chest: Effort normal. No respiratory distress. He has decreased breath sounds in the right lower field and the left lower field. He has no rales.   Abdominal: Soft. Bowel sounds are normal. He exhibits distension (ascites). There is no tenderness.   Musculoskeletal: He exhibits edema (2+ BLE).   Neurological: He is oriented to person, place, and time and easily aroused. He appears lethargic.   + asterixis   Skin: Skin is warm, dry and intact. He is not diaphoretic. There is erythema.   BLE crackling, scaly skin   Psychiatric:   + AAOx4, lethargic   Nursing note and vitals reviewed.      Laboratory:  Immunosuppressants     None        CBC:     Recent Labs  Lab 07/04/18  0410   WBC 3.80*   RBC 2.62*   HGB 9.0*   HCT 26.2*   PLT 43*   MCV  100*   MCH 34.4*   MCHC 34.4     CMP:     Recent Labs  Lab 07/04/18  0411   *   CALCIUM 9.2   ALBUMIN 2.8*   PROT 4.9*      K 4.2   CO2 17*   *   BUN 38*   CREATININE 1.8*   ALKPHOS 80   ALT 33   AST 51*   BILITOT 2.6*     Coagulation:     Recent Labs  Lab 07/04/18  0410   INR 1.6*     Labs within the past 24 hours have been reviewed.    Diagnostic Results:  I have personally reviewed all pertinent imaging studies.Scheduled Meds:   aspirin  81 mg Oral Daily    ergocalciferol  50,000 Units Oral Q7 Days    fluconazole  200 mg Oral Daily    furosemide  40 mg Oral Daily    heparin (porcine)  5,000 Units Subcutaneous Q8H    lactulose  45 g Oral TID    levETIRAcetam  500 mg Oral BID    lidocaine  2 patch Transdermal Q24H    lipase-protease-amylase 12,000-38,000-60,000 units  2 capsule Oral TID WM    omega-3 acid ethyl esters  2 g Oral Daily with breakfast    pantoprazole  40 mg Oral BID    promethazine (PHENERGAN) IVPB  12.5 mg Intravenous Once    propranolol  5 mg Oral BID    rifAXImin  550 mg Oral BID    sertraline  50 mg Oral Daily     Continuous Infusions:  PRN Meds:acetaminophen, diphenhydrAMINE-zinc acetate 1-0.1%, lactulose, lactulose, ondansetron, sodium chloride 0.9%    Review of Systems   Constitutional: Positive for appetite change (wife reports, patient does not much), chills and fatigue. Negative for activity change and fever.   HENT: Negative for mouth sores, sore throat and trouble swallowing.    Eyes: Negative for visual disturbance.   Respiratory: Positive for shortness of breath (upon walking fast).    Cardiovascular: Positive for leg swelling. Negative for chest pain.   Endocrine: Negative for polydipsia and polyuria.   Genitourinary: Negative for decreased urine volume and difficulty urinating.   Musculoskeletal: Positive for arthralgias (both shoulders have been hurting for last 2 weeks, right > left).   Skin: Negative for rash.   Neurological: Positive for weakness.  Negative for dizziness, syncope and headaches.   Hematological: Bruises/bleeds easily.   Psychiatric/Behavioral: Negative for agitation, confusion, hallucinations and sleep disturbance.     Objective:     Vital Signs (Most Recent):  Temp: 97.8 °F (36.6 °C) (07/04/18 0317)  Pulse: 60 (07/04/18 0317)  Resp: 20 (07/04/18 0317)  BP: 136/64 (07/04/18 0317)  SpO2: 98 % (07/04/18 0317) Vital Signs (24h Range):  Temp:  [96.1 °F (35.6 °C)-98.7 °F (37.1 °C)] 97.8 °F (36.6 °C)  Pulse:  [60-70] 60  Resp:  [16-20] 20  SpO2:  [96 %-98 %] 98 %  BP: (100-136)/(51-64) 136/64     Weight: (!) 140.3 kg (309 lb 4.9 oz)  Body mass index is 39.71 kg/m².    Intake/Output - Last 3 Shifts       07/02 0700 - 07/03 0659 07/03 0700 - 07/04 0659 07/04 0700 - 07/05 0659    P.O. 810 885     Total Intake(mL/kg) 810 (5.8) 885 (6.3)     Urine (mL/kg/hr) 300 (0.1) 1200 (0.4)     Total Output 300 1200      Net +510 -315             Urine Occurrence 4 x 0 x     Stool Occurrence 5 x 4 x     Emesis Occurrence  0 x           Physical Exam   Constitutional: He is oriented to person, place, and time. He appears well-developed and well-nourished.   HENT:   Head: Normocephalic and atraumatic.   Mouth/Throat: No oropharyngeal exudate.   Eyes: EOM are normal. Pupils are equal, round, and reactive to light. Scleral icterus is present.   Neck: No JVD present.   Cardiovascular: Exam reveals no gallop and no friction rub.    Murmur heard.  Pulmonary/Chest: Effort normal and breath sounds normal. No stridor. No respiratory distress. He has no wheezes. He has no rales.   Abdominal: Soft. Bowel sounds are normal. He exhibits distension. He exhibits no mass.   Musculoskeletal: He exhibits edema. He exhibits no tenderness.   Lymphadenopathy:     He has no cervical adenopathy.   Neurological: He is alert and oriented to person, place, and time.   Skin: Skin is warm and dry. No rash noted. No erythema.   Psychiatric: He has a normal mood and affect. His behavior is normal.        Laboratory:  Immunosuppressants     None        CBC:   Recent Labs  Lab 18   WBC 3.80*   RBC 2.62*   HGB 9.0*   HCT 26.2*   PLT 43*   *   MCH 34.4*   MCHC 34.4     CMP:   Recent Labs  Lab 18   *   CALCIUM 9.2   ALBUMIN 2.8*   PROT 4.9*      K 4.2   CO2 17*   *   BUN 38*   CREATININE 1.8*   ALKPHOS 80   ALT 33   AST 51*   BILITOT 2.6*     Coagulation:   Recent Labs  Lab 18   INR 1.6*     Labs within the past 24 hours have been reviewed.    MELD-Na score: 21 at 2018  4:11 AM  MELD score: 21 at 2018  4:11 AM  Calculated from:  Serum Creatinine: 1.8 mg/dL at 2018  4:11 AM  Serum Sodium: 139 mmol/L (Rounded to 137) at 2018  4:11 AM  Total Bilirubin: 2.6 mg/dL at 2018  4:11 AM  INR(ratio): 1.6 at 2018  4:10 AM  Age: 63 years    Will update MELD with 21 today as previous MELD of 26  yesterday.     Diagnostic Results:  US Liver Transplant Post:  No results found for this or any previous visit.  None    Assessment/Plan:     * Acute kidney injury superimposed on chronic kidney disease    - VICKY initially improving with albumin infusion but with minimal intake  and .  - Ur Na: 32, unlikely for HRS.  - Cr improved, continue lasix 40mg daily and albumin PRN, albumin for 1 dose today  - Bicarb 17, start Na Bicarb 1300 mg BID        Dermatitis associated with moisture              Physical deconditioning    - PT consulted.  - encourage OOBTC. Walking in halls with walker.         Ascites due to alcoholic cirrhosis    - Para  negative for infection.        Coagulopathy    - Secondary to decompensated liver disease.        CKD (chronic kidney disease), stage III    - see acute kidney injury superimposed on CKD III        Cholestatic pruritus    - cholestyramine has previously been ineffective, given depressed mood; will initiate zoloft for mood and pruritus.        Hypoalbuminemia due to protein-calorie malnutrition    -  "Dietary consulted for calorie count.  - Pt eating poorly accord to wife.  Does take boost supplement.  - "pre-hab" regimen and supplements ordered.   - Patient drinking at least 2-3 boost supplements, protein shake, and benoprotein daily.   - If continues to have low intake, start Megace 40 mg tablet daily tomorrow.        Pancytopenia    - related to decompensated liver disease        Decompensated hepatic cirrhosis    - Placed status 7 on  for prescription drug coverage change. Re-activated 7/3, MELD 26 ( 7/3), re-MELDed today 18 with 21.   - propanolol decreased to 5mg BID, monitor BP closely    MELD-Na score: 21 at 2018  4:11 AM  MELD score: 21 at 2018  4:11 AM  Calculated from:  Serum Creatinine: 1.8 mg/dL at 2018  4:11 AM  Serum Sodium: 139 mmol/L (Rounded to 137) at 2018  4:11 AM  Total Bilirubin: 2.6 mg/dL at 2018  4:11 AM  INR(ratio): 1.6 at 2018  4:10 AM  Age: 63 years        Coronary artery disease involving native coronary artery of native heart without angina pectoris    - Continue home ASA.        Anemia of chronic disease    - H&H stable.  - 1 unit PRBC transfused .         Thrombocytopenia    - Secondary to splenomegaly from portal HTN- should improve with txp.  - No s/s overt bleed.        Seizures    - Oral Keppra transitioned to IV as pt not able to swallow .  - Resumed oral Keppra since mentation improved.  - EEG  negative.        Bilateral edema of lower extremity    - completed vanc x 7 days for BLE cellulitis  - Cr elevated but stable.   - edema improved, continue with gentle diuresis, albumin PRN        Hepatic encephalopathy    - Acute on chronic  - improved from grade 3 to 1-2 with lactulose enemas  + , followed by oral lactulose, xifaxan.  - continue rifaximin 550 mg BID.  - infx workup initiated: blood/urine/ascites. Started on cefepime/vanc, d/c'd . Blood Cultures  are neg to date.   - adjusting lactulose regimen to " prevent acute HE. Grade II HE this AM, improved with extra lactulose dose.            VTE Risk Mitigation         Ordered     heparin (porcine) injection 5,000 Units  Every 8 hours      06/22/18 1502     IP VTE HIGH RISK PATIENT  Once      06/22/18 1502          The patients clinical status was discussed at multidisplinary rounds, involving transplant surgery, transplant medicine, pharmacy, nursing, nutrition, and social work    Discharge Planning:  No Patient Care Coordination Note on file.      Waleska Roland MD  Liver Transplant  [facility]

## 2018-07-04 NOTE — ASSESSMENT & PLAN NOTE
- Placed status 7 on  for prescription drug coverage change. Re-activated 7/3, MELD 26 ( 7/3), re-MELDed today 18 with 21.   - propanolol decreased to 5mg BID, monitor BP closely    MELD-Na score: 21 at 2018  4:11 AM  MELD score: 21 at 2018  4:11 AM  Calculated from:  Serum Creatinine: 1.8 mg/dL at 2018  4:11 AM  Serum Sodium: 139 mmol/L (Rounded to 137) at 2018  4:11 AM  Total Bilirubin: 2.6 mg/dL at 2018  4:11 AM  INR(ratio): 1.6 at 2018  4:10 AM  Age: 63 years

## 2018-07-04 NOTE — NURSING
Pt vomited twice. After the first time 8mg ODT given. Within less than 10 minutes pt vomited again. Per Dr. Vargas 1 time dose of 4mg IV zofran to be given. PRN IV reglan ordered. Await arrival from pharmacy for future use.

## 2018-07-05 ENCOUNTER — TELEPHONE (OUTPATIENT)
Dept: TRANSPLANT | Facility: CLINIC | Age: 63
End: 2018-07-05

## 2018-07-05 LAB
ALBUMIN SERPL BCP-MCNC: 2.7 G/DL
ALP SERPL-CCNC: 75 U/L
ALT SERPL W/O P-5'-P-CCNC: 38 U/L
ANION GAP SERPL CALC-SCNC: 7 MMOL/L
AST SERPL-CCNC: 54 U/L
BASOPHILS # BLD AUTO: 0.07 K/UL
BASOPHILS NFR BLD: 1.8 %
BILIRUB SERPL-MCNC: 2.5 MG/DL
BUN SERPL-MCNC: 38 MG/DL
CALCIUM SERPL-MCNC: 9.1 MG/DL
CHLORIDE SERPL-SCNC: 115 MMOL/L
CO2 SERPL-SCNC: 19 MMOL/L
CREAT SERPL-MCNC: 2.1 MG/DL
DIFFERENTIAL METHOD: ABNORMAL
EOSINOPHIL # BLD AUTO: 0.4 K/UL
EOSINOPHIL NFR BLD: 11.2 %
ERYTHROCYTE [DISTWIDTH] IN BLOOD BY AUTOMATED COUNT: 19 %
EST. GFR  (AFRICAN AMERICAN): 37.9 ML/MIN/1.73 M^2
EST. GFR  (NON AFRICAN AMERICAN): 32.7 ML/MIN/1.73 M^2
GLUCOSE SERPL-MCNC: 144 MG/DL
HCT VFR BLD AUTO: 25.2 %
HGB BLD-MCNC: 8.3 G/DL
IMM GRANULOCYTES # BLD AUTO: 0.02 K/UL
IMM GRANULOCYTES NFR BLD AUTO: 0.5 %
INR PPP: 1.6
LYMPHOCYTES # BLD AUTO: 0.8 K/UL
LYMPHOCYTES NFR BLD: 21.1 %
MAGNESIUM SERPL-MCNC: 1.8 MG/DL
MCH RBC QN AUTO: 33.6 PG
MCHC RBC AUTO-ENTMCNC: 32.9 G/DL
MCV RBC AUTO: 102 FL
MONOCYTES # BLD AUTO: 0.9 K/UL
MONOCYTES NFR BLD: 22.1 %
NEUTROPHILS # BLD AUTO: 1.7 K/UL
NEUTROPHILS NFR BLD: 43.3 %
NRBC BLD-RTO: 0 /100 WBC
PLATELET # BLD AUTO: 46 K/UL
PMV BLD AUTO: 13 FL
POTASSIUM SERPL-SCNC: 4 MMOL/L
PROT SERPL-MCNC: 4.9 G/DL
PROTHROMBIN TIME: 15.5 SEC
RBC # BLD AUTO: 2.47 M/UL
SODIUM SERPL-SCNC: 141 MMOL/L
WBC # BLD AUTO: 3.94 K/UL

## 2018-07-05 PROCEDURE — 63600175 PHARM REV CODE 636 W HCPCS: Mod: NTX | Performed by: NURSE PRACTITIONER

## 2018-07-05 PROCEDURE — 83735 ASSAY OF MAGNESIUM: CPT | Mod: NTX

## 2018-07-05 PROCEDURE — P9047 ALBUMIN (HUMAN), 25%, 50ML: HCPCS | Mod: JG,NTX | Performed by: PHYSICIAN ASSISTANT

## 2018-07-05 PROCEDURE — 97110 THERAPEUTIC EXERCISES: CPT | Mod: NTX

## 2018-07-05 PROCEDURE — 85025 COMPLETE CBC W/AUTO DIFF WBC: CPT | Mod: NTX

## 2018-07-05 PROCEDURE — 25000003 PHARM REV CODE 250: Mod: NTX | Performed by: NURSE PRACTITIONER

## 2018-07-05 PROCEDURE — 36415 COLL VENOUS BLD VENIPUNCTURE: CPT | Mod: NTX

## 2018-07-05 PROCEDURE — 85610 PROTHROMBIN TIME: CPT | Mod: NTX

## 2018-07-05 PROCEDURE — 63600175 PHARM REV CODE 636 W HCPCS: Mod: JG,NTX | Performed by: PHYSICIAN ASSISTANT

## 2018-07-05 PROCEDURE — 80053 COMPREHEN METABOLIC PANEL: CPT | Mod: NTX

## 2018-07-05 PROCEDURE — 25000003 PHARM REV CODE 250: Mod: NTX | Performed by: PHYSICIAN ASSISTANT

## 2018-07-05 PROCEDURE — 63600175 PHARM REV CODE 636 W HCPCS: Mod: NTX | Performed by: PHYSICIAN ASSISTANT

## 2018-07-05 PROCEDURE — 20600001 HC STEP DOWN PRIVATE ROOM: Mod: NTX

## 2018-07-05 PROCEDURE — 25000003 PHARM REV CODE 250: Mod: NTX | Performed by: INTERNAL MEDICINE

## 2018-07-05 PROCEDURE — 99233 SBSQ HOSP IP/OBS HIGH 50: CPT | Mod: NTX,,, | Performed by: PHYSICIAN ASSISTANT

## 2018-07-05 RX ORDER — ALBUMIN HUMAN 250 G/1000ML
25 SOLUTION INTRAVENOUS ONCE
Status: COMPLETED | OUTPATIENT
Start: 2018-07-05 | End: 2018-07-05

## 2018-07-05 RX ADMIN — PANTOPRAZOLE SODIUM 40 MG: 40 TABLET, DELAYED RELEASE ORAL at 09:07

## 2018-07-05 RX ADMIN — OMEGA-3-ACID ETHYL ESTERS 2 G: 1 CAPSULE, LIQUID FILLED ORAL at 08:07

## 2018-07-05 RX ADMIN — METOCLOPRAMIDE 10 MG: 5 INJECTION, SOLUTION INTRAMUSCULAR; INTRAVENOUS at 08:07

## 2018-07-05 RX ADMIN — HEPARIN SODIUM 5000 UNITS: 5000 INJECTION, SOLUTION INTRAVENOUS; SUBCUTANEOUS at 09:07

## 2018-07-05 RX ADMIN — PROPRANOLOL HYDROCHLORIDE 5 MG: 20 SOLUTION ORAL at 09:07

## 2018-07-05 RX ADMIN — HEPARIN SODIUM 5000 UNITS: 5000 INJECTION, SOLUTION INTRAVENOUS; SUBCUTANEOUS at 03:07

## 2018-07-05 RX ADMIN — HEPARIN SODIUM 5000 UNITS: 5000 INJECTION, SOLUTION INTRAVENOUS; SUBCUTANEOUS at 06:07

## 2018-07-05 RX ADMIN — LEVETIRACETAM 500 MG: 500 TABLET, FILM COATED ORAL at 08:07

## 2018-07-05 RX ADMIN — SODIUM BICARBONATE 650 MG TABLET 1300 MG: at 09:07

## 2018-07-05 RX ADMIN — LIDOCAINE 2 PATCH: 50 PATCH TOPICAL at 12:07

## 2018-07-05 RX ADMIN — PANCRELIPASE 2 CAPSULE: 60000; 12000; 38000 CAPSULE, DELAYED RELEASE PELLETS ORAL at 08:07

## 2018-07-05 RX ADMIN — SERTRALINE HYDROCHLORIDE 50 MG: 50 TABLET ORAL at 08:07

## 2018-07-05 RX ADMIN — LACTULOSE 45 G: 20 SOLUTION ORAL at 08:07

## 2018-07-05 RX ADMIN — METOCLOPRAMIDE 10 MG: 5 INJECTION, SOLUTION INTRAMUSCULAR; INTRAVENOUS at 04:07

## 2018-07-05 RX ADMIN — LACTULOSE 45 G: 10 SOLUTION ORAL at 03:07

## 2018-07-05 RX ADMIN — LACTULOSE 45 G: 20 SOLUTION ORAL at 09:07

## 2018-07-05 RX ADMIN — PANCRELIPASE 2 CAPSULE: 60000; 12000; 38000 CAPSULE, DELAYED RELEASE PELLETS ORAL at 04:07

## 2018-07-05 RX ADMIN — SODIUM BICARBONATE 650 MG TABLET 1300 MG: at 08:07

## 2018-07-05 RX ADMIN — ERGOCALCIFEROL 50000 UNITS: 1.25 CAPSULE ORAL at 08:07

## 2018-07-05 RX ADMIN — FLUCONAZOLE 200 MG: 200 TABLET ORAL at 08:07

## 2018-07-05 RX ADMIN — LACTULOSE 45 G: 20 SOLUTION ORAL at 03:07

## 2018-07-05 RX ADMIN — ASPIRIN 81 MG: 81 TABLET, COATED ORAL at 08:07

## 2018-07-05 RX ADMIN — PANCRELIPASE 2 CAPSULE: 60000; 12000; 38000 CAPSULE, DELAYED RELEASE PELLETS ORAL at 12:07

## 2018-07-05 RX ADMIN — FUROSEMIDE 40 MG: 40 TABLET ORAL at 08:07

## 2018-07-05 RX ADMIN — RIFAXIMIN 550 MG: 550 TABLET ORAL at 09:07

## 2018-07-05 RX ADMIN — RIFAXIMIN 550 MG: 550 TABLET ORAL at 08:07

## 2018-07-05 RX ADMIN — PROPRANOLOL HYDROCHLORIDE 5 MG: 20 SOLUTION ORAL at 08:07

## 2018-07-05 RX ADMIN — PANTOPRAZOLE SODIUM 40 MG: 40 TABLET, DELAYED RELEASE ORAL at 08:07

## 2018-07-05 RX ADMIN — ALBUMIN HUMAN 25 G: 0.25 SOLUTION INTRAVENOUS at 12:07

## 2018-07-05 RX ADMIN — LEVETIRACETAM 500 MG: 500 TABLET, FILM COATED ORAL at 09:07

## 2018-07-05 NOTE — PLAN OF CARE
Problem: Occupational Therapy Goal  Goal: Occupational Therapy Goal  Goals to be met by: 7/13/18     Patient will increase functional independence with ADLs by performing:    Grooming while standing at sink with Stand-by Assistance. - MET 7/2  Toileting from toilet with Stand-by Assistance for hygiene and clothing management.  Toilet transfer to toilet with Stand-by Assistance.MET 7/2  Upper extremity exercise program x15 reps per handout, with assistance as needed.  Pt will engage in functional mobility to simulate household distances in order to maximize functional activity tolerance required for engagement in occupations of choice with SBA           Outcome: Ongoing (interventions implemented as appropriate)  Cont' with plan  Anni Roman, OTR

## 2018-07-05 NOTE — PLAN OF CARE
Problem: Patient Care Overview  Goal: Plan of Care Review    Recommendations    Recommendation/Intervention:     1. Recommend mechanical soft diet 2/2 pt having difficulty eating some foods.   2. Consider liberbalizing diet by taking off diabetic restriction. Continue Novasource Renal ONS and beneprotein.   3. If po intake remains suboptimal and NG placed, recommend noctural feeds to supplement intake. Isosource 1.5 @ goal rate 70mL/hr x 12 hours nightly to provide 1260kcals, 57g protein and 642mL free water.   5. RD following.     Goals: pt to consume nutrients >/= 85% EEN/EPN   Nutrition Goal Status: progressing towards goal

## 2018-07-05 NOTE — PT/OT/SLP PROGRESS
"Occupational Therapy   Treatment    Name: Alon Admason  MRN: [id number]  Admitting Diagnosis:  Acute kidney injury superimposed on chronic kidney disease       Recommendations:     Discharge Recommendations: home with home health  Discharge Equipment Recommendations:  none  Barriers to discharge:  None    Subjective     Communicated with: nsg prior to session.  Pain/Comfort:  · Pain Rating 1:  (c/o pain in B shoulders, says it's "sharp" pain but u/a to fully ID what elicits pain)  · Pain Addressed 1: Reposition, Distraction, Cessation of Activity    Patients cultural, spiritual, Zoroastrian conflicts given the current situation: none    Objective:     Patient found with: peripheral IV, telemetry    General Precautions: Standard, fall   Orthopedic Precautions:N/A   Braces:       Occupational Performance:    Bed Mobility:    · Pt up in BR on arrival, with nsg and son performing toileting, standing for nsg to assist with cleaning     Functional Mobility/Transfers:  · Functional Mobility: ambulated from BR back to bed with SBA and pt holding on to walls, bed; declined further ambulation at this time 2/2 fatigue after BR activity    Activities of Daily Living:  · toileting with mod assist for hygiene  · Total assist socks      Patient left HOB elevated with all lines intact and son and mom present    [facility] 6 Click:  [facility] Total Score: 18    Treatment & Education:  Pt performed above activity, sat EOB after BR activity for BUE and BLE exercises: performed AAROM for shoulder presses x 10 reps, abduction AAROM x 10 reps, shoulder rolls x 10, scapular retraction x 12 reps, modified AAROM for pendulums seated x 10 reps each direction, BLE: ankle pumps x 20, marches x 12 reps, knee extension x 10 reps; educated on exercises seated and in bed for BUE's and BLE's.   Education:    Assessment:     Alon Adamson is a 63 y.o. male with a medical diagnosis of Acute kidney injury superimposed on chronic kidney disease.  He " presents with UE weakness and pain.  Performance deficits affecting function are weakness, impaired endurance, impaired self care skills, impaired functional mobilty, impaired cardiopulmonary response to activity, edema, pain.      Rehab Prognosis:  good; patient would benefit from acute skilled OT services to address these deficits and reach maximum level of function.       Plan:     Patient to be seen 3 x/week to address the above listed problems via self-care/home management, therapeutic activities, therapeutic exercises  · Plan of Care Expires: 07/28/18  · Plan of Care Reviewed with: patient, mother, son    This Plan of care has been discussed with the patient who was involved in its development and understands and is in agreement with the identified goals and treatment plan    GOALS:    Occupational Therapy Goals        Problem: Occupational Therapy Goal    Goal Priority Disciplines Outcome Interventions   Occupational Therapy Goal     OT, PT/OT Ongoing (interventions implemented as appropriate)    Description:  Goals to be met by: 7/13/18     Patient will increase functional independence with ADLs by performing:    Grooming while standing at sink with Stand-by Assistance. - MET 7/2  Toileting from toilet with Stand-by Assistance for hygiene and clothing management.  Toilet transfer to toilet with Stand-by Assistance.MET 7/2  Upper extremity exercise program x15 reps per handout, with assistance as needed.  Pt will engage in functional mobility to simulate household distances in order to maximize functional activity tolerance required for engagement in occupations of choice with SBA                            Time Tracking:     OT Date of Treatment: 07/05/18  OT Start Time: 0955  OT Stop Time: 1019  OT Total Time (min): 24 min    Billable Minutes:Therapeutic Exercise 24 min    Anni Roman OT  7/5/2018

## 2018-07-05 NOTE — PLAN OF CARE
Problem: Patient Care Overview  Goal: Plan of Care Review  Outcome: Ongoing (interventions implemented as appropriate)  Pt AAOx4, VSS, afebrile.  Pt out of room with family to waiting room during shift to watch fire works show.  Pt ambulated to restroom and back with assistance of son.  Pt appetite low d/t nausea and vomiting earlier today.  Pt did not drink boost nor eat dinner.  PRN IV reglan admistered with PM medications.  Son remain at pt bedside and active with pt care.  Sween lotion applied to LE.  Currently denies c/o pain.  Fall risk precautions initiated.  Pt in lowest bed position setting, lighting adjusted, pt to wear nonskid socks when ambulating, side rails up x2, urinal at bedside within reach.  Pt remain free from falls during shift.  Call light within reach. Pt verbalize understanding to call when needed assistance.  Will continue to monitor.

## 2018-07-05 NOTE — ASSESSMENT & PLAN NOTE
- VICKY initially improving with albumin infusion but with minimal intake 6/25 and 6/26.  - Ur Na: 32, unlikely for HRS.  - Bicarb 17, start Na Bicarb 1300 mg BID  - Cr  Slightly worse today at 2.1. WIll give dose of albumin and hold lasix. Plan to reassess tomorrow. Was previously on 40mg lasix daily.

## 2018-07-05 NOTE — TELEPHONE ENCOUNTER
PATIENT NAME: Alon Muller [facility] #: [id number]    Lab Results   Component Value Date    CREATININE 2.1 (H) 07/05/2018     07/05/2018    BILITOT 2.5 (H) 07/05/2018    ALBUMIN 2.7 (L) 07/05/2018    INR 1.6 (H) 07/05/2018     MELD 22  Encephalopathy: 1 - 2  Ascites: slight  Dialysis: no     Recertification requestor: Yady Dietz

## 2018-07-05 NOTE — ASSESSMENT & PLAN NOTE
- Acute on chronic  - improved from grade 3 to 1-2 with lactulose enemas 6/25 + 6/26, followed by oral lactulose, xifaxan.  - continue rifaximin 550 mg BID.  - infx workup initiated: blood/urine/ascites. Started on cefepime/vanc, d/c'd 7/1. Blood Cultures 6/27 are neg to date.   - adjusting lactulose regimen to prevent acute HE. PRN lactulose ordered in addition to scheduled dosing.

## 2018-07-05 NOTE — PROGRESS NOTES
Update:     SW met with patient at bedside. Patient's son and mother was also present in the room. Patient was oriented but reported being tired. Patient reported that he was coping okay, but did not look physically well. Patient's mother,Dary Curry, reported that she had not observed any changes in patient's behavior, however, reported that the patient is ready to be discharged from hospital. The patient and his supports, denied any questions or concerns.  Patient denied any increased mental health symptoms at this time.  Patient verbalized understanding and agreement with the information reviewed, social work availability, and how to access available resources if needed. SW remains available      Supervised by Suzie Vasquez LCSW

## 2018-07-05 NOTE — PT/OT/SLP PROGRESS
Physical Therapy      Patient Name:  Alon Adamson   MRN:  [id number]    Patient not seen today secondary to Patient fatigue (Pt declined 2* increased fatigue with pt demo'ing difficulty maintaining eyes open during conversation with PT.). Will follow-up at next scheduled session as able.    Lucille Craig, PT, DPT   7/5/2018  [phone]

## 2018-07-05 NOTE — ASSESSMENT & PLAN NOTE
- Placed status 7 on  for prescription drug coverage change. Re-activated 7/3, MELD 26 ( 7/3), re-MELDed today 18 with a MELD score of 22.   - propanolol decreased to 5mg BID, monitor BP closely    MELD-Na score: 22 at 2018  5:21 AM  MELD score: 22 at 2018  5:21 AM  Calculated from:  Serum Creatinine: 2.1 mg/dL at 2018  5:21 AM  Serum Sodium: 141 mmol/L (Rounded to 137) at 2018  5:21 AM  Total Bilirubin: 2.5 mg/dL at 2018  5:21 AM  INR(ratio): 1.6 at 2018  5:21 AM  Age: 63 years

## 2018-07-05 NOTE — PLAN OF CARE
Pt sleeping for much of the day (family reports that is normal for him). He has remained Ox4 and is easily aroused. Vital signs stable and he is afebrile. He had 3 large BMs today. Prior to breakfast, he reported abdominal fullness and belching. Reglan given prior to eating and meds and he had no problem with nausea/vomiting. This afternoon however, he vomited a large quantity a couple hours after eating lunch. He reported that he was sleeping and woke up vomiting and then immediately felt better. Reglan given prior to dinner, and he thus far has not had any problems. Family is at bedside, attentive to pt. He is up with one assist. Barrier cream applied to skin breakdown on buttocks and scrotum.

## 2018-07-05 NOTE — PROGRESS NOTES
[facility]  Adult Nutrition  Progress Note    SUMMARY       Recommendations    Recommendation/Intervention:     1. Recommend mechanical soft diet 2/2 pt having difficulty eating some foods.   2. Consider liberbalizing diet by taking off diabetic restriction. Continue Novasource Renal ONS and beneprotein.   3. If po intake remains suboptimal and NG placed, recommend noctural feeds to supplement intake. Isosource 1.5 @ goal rate 70mL/hr x 12 hours nightly to provide 1260kcals, 57g protein and 642mL free water.   5. RD following.     Goals: pt to consume nutrients >/= 85% EEN/EPN   Nutrition Goal Status: progressing towards goal  Communication of RD Recs:  (POC)    Reason for Assessment    Reason for Assessment: RD follow-up  Diagnosis:  (Acute kidney injury superimposed on chronic kidney disease )  Relevant Medical History: DM, Etoh abuse, Cirrhosis, HTN  Interdisciplinary Rounds: did not attend  General Information Comments: Pt reports poor appetite 2/2 nausea. States that he is drinking novasource renal and beneportein. Pt reports difficulty eating some foods. Unable to diagnosis malnutrition at this time. Noted pt with muscle wasting in temples however pt with stable weight over past year, no muscle loss in interossesous muscles, scapula and clavicles. Edema likely from underlying liver disease and not from malnutrition.   Nutrition Discharge Planning: Adequate PO intake     Nutrition Risk Screen    Nutrition Risk Screen: no indicators present    Nutrition/Diet History    Patient Reported Diet/Restrictions/Preferences: low salt  Typical Food/Fluid Intake: Pt ate most of bkfst this morning, tolerating increased intake with each meal. Novasource renal ONS ordered. Pt agrees to drink. NG tube not placed yesterday, possibly held today given pt's increased alertness and intake.  Food Preferences: No coffee, no tea, likes Jell-O  Do you have any cultural, spiritual, Confucianist conflicts, given your  "current situation?: no  Food Allergies: NKFA  Factors Affecting Nutritional Intake: decreased appetite, nausea/vomiting    Anthropometrics    Temp: 98.4 °F (36.9 °C)  Height: 6' 2" (188 cm)  Height (inches): 74 in  Weight Method: Bed Scale  Weight: (!) 140.3 kg (309 lb 4.9 oz)  Weight (lb): (!) 309.31 lb  Ideal Body Weight (IBW), Male: 190 lb  % Ideal Body Weight, Male (lb): 161.28 lb  BMI (Calculated): 39.4  BMI Grade: 35 - 39.9 - obesity - grade II     Lab/Procedures/Meds    Pertinent Labs Reviewed: reviewed  Pertinent Labs Comments: BUN 38, Cr 2.1, GFR 32.7, glucose 144, T bili 2.5, AST 54  Pertinent Medications Reviewed: reviewed  Pertinent Medications Comments: aspirin, lasix, pantoprazole    Physical Findings/Assessment    Overall Physical Appearance: edematous, obese  Oral/Mouth Cavity: tooth/teeth missing  Skin: edema    Estimated/Assessed Needs    Weight Used For Calorie Calculations: (!) 138.2 kg (304 lb 10.8 oz)  Energy Calorie Requirements (kcal): 2702 kcal/d  Energy Need Method: [place]-St Jeor  Protein Requirements: 138g/d (1.0g/kg)  Weight Used For Protein Calculations: (!) 138.2 kg (304 lb 10.8 oz)  RDA Method (mL): 2702     Nutrition Prescription Ordered    Current Diet Order: diabetic, low sodium  Oral Nutrition Supplement: Novasource Renal TID, beneprotein    Evaluation of Received Nutrient/Fluid Intake    I/O: +1.4L x 24 hrs, 7.95L since admit  Comments: LBM 7/4     % Intake of Estimated Energy Needs: 0 - 25 %  % Meal Intake: 0 - 25 %    Nutrition Risk    Level of Risk/Frequency of Follow-up: low (f/u 1 x wk)     Assessment and Plan     Contributing Nutrition Diagnosis  Altered nutritional labs     Related to (etiology):   Ascites, VICKY, ESLD     Signs and Symptoms (as evidenced by):   Crt-2.1, GFR-37.9, T.traci-2.4, Alb-2.6     Nutrition Diagnosis Status:   Improving     Monitor and Evaluation    Food and Nutrient Intake: energy intake, food and beverage intake, enteral nutrition intake  Food and " Nutrient Adminstration: diet order, enteral and parenteral nutrition administration  Knowledge/Beliefs/Attitudes: food and nutrition knowledge/skill  Physical Activity and Function: nutrition-related ADLs and IADLs  Anthropometric Measurements: weight, weight change  Biochemical Data, Medical Tests and Procedures:  (All labs)  Nutrition-Focused Physical Findings: overall appearance, skin     Nutrition Follow-Up    RD Follow-up?: Yes

## 2018-07-05 NOTE — PROGRESS NOTES
[facility]  Liver Transplant  Progress Note    Patient Name: lAon Adamson  MRN: [id number]  Admission Date: 6/22/2018  Hospital Length of Stay: 13 days  Code Status: Full Code  Primary Care Provider: Mckinley Vides MD  Post-Operative Day:     ORGAN:     Disease Etiology: Alcoholic Cirrhosis  Donor Type:     CDC High Risk:     Donor CMV Status:   Donor CMV Status:   Donor HBcAB:     Donor HCV Status:     Donor HBV TAMAR: Organ record is missing.  Donor HCV TAMAR: Organ record is missing.  Whole or Partial:   Biliary Anastomosis:   Arterial Anatomy:   Subjective:     History of Present Illness:  Mr. Adamson is a 64 yo M with PMH ESLD secondary to ETOH cirrhosis, CKD3, CAD.  Complications of liver disease include hyperbilirubinemia, hypoalbuminemia, severe malnutrition,   hepatic encephalopathy, thrombocytopenia, splenomegaly, ascites, hypervolemia, coagulopathy, portal HTN.  Pt recently admitted for severe hepatic encephalopathy requiring intubation but has been stable from mentation standpoint post resolution of acute enceophalopathy. Mr. Adamson was seen in clinic 6/22 and was noted to have significant hypervolemia, weeping from BLE, and VICKY on CKD3 on routine labs.  His MELD increased from 22 to 26 per labs- coordinator notified and MELD updated.  Cr elevated to 2.3 from baseline of 1.4.  Also with worsening ascites.  Pt reported increased pruritis, generalized fatigue and weakness.  He presented in wheelchair.  ROS otherwise negative.      Hospital Course:  Patient is listed for liver transplant, placed on internal hold 6/25 for HE then status 7 on 6/26 due to change in prescription coverage. He was re-activated on the list 7/3, MELD 26 (expires 7/3). Pt started on IV diuretics 6/22 and continued 6/23 and 6/24.  BLE edema and ascites signif improved with diuresis and kidney function also improved.  Pt noted to have increased asterixis  6/24. Upon admission, he was noted to have grade 1 hepatic  encephalopathy which remained until 6/24 when grade 1-2 HE was noted.  Lactulose increased until pt had a BM- he had not had BM during previous day. On 6/25 AM, pt with signif worsened encephalopathy opening eyes to verbal and tactile stimuli only.  Pt given lactulose enemas, infx workup sent, started on IV abx (ceftriaxone, vanc), hydrated with albumin, diuretics discontinued, CT head obtained and negative. Paracentesis 6/25 negative for peritonitis per cell count and U/A negative. Pt also with concern for BLE cellulitis- upper legs bright red with lower BLE still with dark appearance of venous stasis. Placed on vancomycin with improvement in BLE redness 6/26. Vanc continued for 7 days (ended 7/1). His HE waxed and waned for several days requiring lactulose enemas. Pt eating minimally during period of encephalopathy requiring gentle hydration with IVF 6/26. Of note, taco placement attempted 6/26 but unable secondary to agitation upon insertion prompting self resolving nose bleed. Micro lab called 6/26 PM with blood cx + for G+C in blood- pt continued on vanc which was started 6/25. ID consulted, suspect + blood cultures contaminant. EEG obtained 6/26 as pt with h/o serizures and negative. Pt continued on home Keppra. VICKY on admission initially improved with diuresis, but Cr sakina intermittently during admission likely related to aggressive diuresis. Of note, with chest pain overnight 6/26-6/27 that resolved without intervention and pt reported as mild.  EKG NSR with PVC, troponin 0.1 in setting of hypervolemia + VICKY.  Normal dobutamine stress 1/2018.  Cardiology consulted and felt not ACS, no need for further workup other than continue to treat current risk factors for CAD with ASA (pt already on ASA and statin as outpt) + BB for portal hypertension which was started.     Interval History: no acute events overnight. Grade II HE this AM, alert, + asterixis. Continues to work well with PT/OT. Does report fatigue today  with basic activity. Continue to encourage OOBTC. Needs to improve po intake, drinking boost and protein shakes with protein powder. Reported some N/V overnight. If intake still poor today, may start Megace tomorrow.  Creatinine increased to 2.1 this moring. Will give dose of albumin and hold lasix today. Reassess tomorrow.      Patient made active on the list based on conference note dated 7/3/18.  Also re-MELD today 22, as previous MELD 21.       Scheduled Meds:   aspirin  81 mg Oral Daily    ergocalciferol  50,000 Units Oral Q7 Days    fluconazole  200 mg Oral Daily    heparin (porcine)  5,000 Units Subcutaneous Q8H    lactulose  45 g Oral TID    levETIRAcetam  500 mg Oral BID    lidocaine  2 patch Transdermal Q24H    lipase-protease-amylase 12,000-38,000-60,000 units  2 capsule Oral TID WM    omega-3 acid ethyl esters  2 g Oral Daily with breakfast    pantoprazole  40 mg Oral BID    promethazine (PHENERGAN) IVPB  12.5 mg Intravenous Once    propranolol  5 mg Oral BID    rifAXImin  550 mg Oral BID    sertraline  50 mg Oral Daily    sodium bicarbonate  1,300 mg Oral BID     Continuous Infusions:  PRN Meds:acetaminophen, diphenhydrAMINE-zinc acetate 1-0.1%, lactulose, lactulose, metoclopramide HCl, ondansetron, sodium chloride 0.9%    Review of Systems   Constitutional: Positive for appetite change (wife reports, patient does not much) and fatigue. Negative for activity change, chills and fever.   HENT: Negative for mouth sores, sore throat and trouble swallowing.    Eyes: Negative for visual disturbance.   Respiratory: Positive for shortness of breath (upon activity).    Cardiovascular: Positive for leg swelling. Negative for chest pain.   Endocrine: Negative for polydipsia and polyuria.   Genitourinary: Negative for decreased urine volume and difficulty urinating.   Musculoskeletal: Positive for arthralgias (both shoulders have been hurting for last 2 weeks, right > left).   Skin: Negative for  rash.   Neurological: Positive for weakness. Negative for dizziness, syncope and headaches.   Hematological: Bruises/bleeds easily.   Psychiatric/Behavioral: Negative for agitation, confusion, hallucinations and sleep disturbance.     Objective:     Vital Signs (Most Recent):  Temp: 98 °F (36.7 °C) (07/05/18 1127)  Pulse: 68 (07/05/18 1127)  Resp: 18 (07/05/18 1127)  BP: 134/60 (07/05/18 1127)  SpO2: 96 % (07/05/18 1127) Vital Signs (24h Range):  Temp:  [97.9 °F (36.6 °C)-98.4 °F (36.9 °C)] 98 °F (36.7 °C)  Pulse:  [61-85] 68  Resp:  [16-20] 18  SpO2:  [96 %-99 %] 96 %  BP: (120-137)/(58-68) 134/60     Weight: (!) 140.3 kg (309 lb 4.9 oz)  Body mass index is 39.71 kg/m².    Intake/Output - Last 3 Shifts       07/03 0700 - 07/04 0659 07/04 0700 - 07/05 0659 07/05 0700 - 07/06 0659    P.O. 885 1640     Total Intake(mL/kg) 885 (6.3) 1640 (11.7)     Urine (mL/kg/hr) 1200 (0.4) 200 (0.1)     Emesis/NG output  0 (0)     Other  0 (0)     Stool  0 (0)     Blood  0 (0)     Total Output 1200 200      Net -315 +1440             Urine Occurrence 0 x 5 x     Stool Occurrence 4 x 3 x     Emesis Occurrence 0 x 2 x           Physical Exam   Constitutional: He is oriented to person, place, and time. He appears well-developed and well-nourished.   HENT:   Head: Normocephalic and atraumatic.   Mouth/Throat: No oropharyngeal exudate.   Eyes: EOM are normal. Pupils are equal, round, and reactive to light. Scleral icterus is present.   Neck: No JVD present.   Cardiovascular: Exam reveals no gallop and no friction rub.    Murmur heard.  Pulmonary/Chest: Effort normal and breath sounds normal. No stridor. No respiratory distress. He has no wheezes. He has no rales.   Abdominal: Soft. Bowel sounds are normal. He exhibits distension. He exhibits no mass.   Musculoskeletal: He exhibits edema. He exhibits no tenderness.   Lymphadenopathy:     He has no cervical adenopathy.   Neurological: He is alert and oriented to person, place, and time.    Skin: Skin is warm and dry. No rash noted. No erythema.   Psychiatric: He has a normal mood and affect. His behavior is normal.       Laboratory:  Immunosuppressants     None        CBC:   Recent Labs  Lab 18  0521   WBC 3.94   RBC 2.47*   HGB 8.3*   HCT 25.2*   PLT 46*   *   MCH 33.6*   MCHC 32.9     CMP:   Recent Labs  Lab 18  0521   *   CALCIUM 9.1   ALBUMIN 2.7*   PROT 4.9*      K 4.0   CO2 19*   *   BUN 38*   CREATININE 2.1*   ALKPHOS 75   ALT 38   AST 54*   BILITOT 2.5*     Coagulation:   Recent Labs  Lab 18  0521   INR 1.6*     Labs within the past 24 hours have been reviewed.    Diagnostic Results:  pertinent imaging reivewed    Assessment/Plan:     * Acute kidney injury superimposed on chronic kidney disease    - VICKY initially improving with albumin infusion but with minimal intake  and .  - Ur Na: 32, unlikely for HRS.  - Bicarb 17, start Na Bicarb 1300 mg BID  - Cr  Slightly worse today at 2.1. WIll give dose of albumin and hold lasix. Plan to reassess tomorrow. Was previously on 40mg lasix daily.         Hepatic encephalopathy    - Acute on chronic  - improved from grade 3 to 1-2 with lactulose enemas  + , followed by oral lactulose, xifaxan.  - continue rifaximin 550 mg BID.  - infx workup initiated: blood/urine/ascites. Started on cefepime/vanc, d/c'd . Blood Cultures  are neg to date.   - adjusting lactulose regimen to prevent acute HE. PRN lactulose ordered in addition to scheduled dosing.         Bilateral edema of lower extremity    - completed vanc x 7 days for BLE cellulitis  - Cr elevated but stable.   - edema improved with gentle diuresis and albumin PRN        Decompensated hepatic cirrhosis    - Placed status 7 on  for prescription drug coverage change. Re-activated 7/3, MELD 26 ( 7/3), re-MELDed today 18 with a MELD score of 22.   - propanolol decreased to 5mg BID, monitor BP closely    MELD-Na score: 22 at  "7/5/2018  5:21 AM  MELD score: 22 at 7/5/2018  5:21 AM  Calculated from:  Serum Creatinine: 2.1 mg/dL at 7/5/2018  5:21 AM  Serum Sodium: 141 mmol/L (Rounded to 137) at 7/5/2018  5:21 AM  Total Bilirubin: 2.5 mg/dL at 7/5/2018  5:21 AM  INR(ratio): 1.6 at 7/5/2018  5:21 AM  Age: 63 years        Hypoalbuminemia due to protein-calorie malnutrition    - Dietary consulted for calorie count.  - Pt eating poorly accord to wife.  Does take boost supplement.  - "pre-hab" regimen and supplements ordered.   - Patient drinking at least 2-3 boost supplements, protein shake, and benoprotein daily.   - If continues to have low intake, start Megace 40 mg tablet daily tomorrow.        Physical deconditioning    - PT consulted.  - encourage OOBTC. Walking in halls with walker.         Dermatitis associated with moisture              Ascites due to alcoholic cirrhosis    - Para 6/25 negative for infection.        Coagulopathy    - Secondary to decompensated liver disease.        CKD (chronic kidney disease), stage III    - see acute kidney injury superimposed on CKD III        Cholestatic pruritus    - cholestyramine has previously been ineffective, given depressed mood; will initiate zoloft for mood and pruritus.        Pancytopenia    - related to decompensated liver disease        Coronary artery disease involving native coronary artery of native heart without angina pectoris    - Continue home ASA.        Anemia of chronic disease    - H&H stable.  - 1 unit PRBC transfused 6/28.         Thrombocytopenia    - Secondary to splenomegaly from portal HTN- should improve with txp.  - No s/s overt bleed.        Seizures    - Oral Keppra transitioned to IV as pt not able to swallow 6/26.  - Resumed oral Keppra since mentation improved.  - EEG 6/26 negative.            VTE Risk Mitigation         Ordered     heparin (porcine) injection 5,000 Units  Every 8 hours      06/22/18 1502     IP VTE HIGH RISK PATIENT  Once      06/22/18 1502    "       The patients clinical status was discussed at multidisplinary rounds, involving transplant surgery, transplant medicine, pharmacy, nursing, nutrition, and social work    Discharge Planning: Not a candidate for d/c at this time.     Alexandrea Das PA-C  Liver Transplant  [facility]

## 2018-07-05 NOTE — SUBJECTIVE & OBJECTIVE
Scheduled Meds:   aspirin  81 mg Oral Daily    ergocalciferol  50,000 Units Oral Q7 Days    fluconazole  200 mg Oral Daily    heparin (porcine)  5,000 Units Subcutaneous Q8H    lactulose  45 g Oral TID    levETIRAcetam  500 mg Oral BID    lidocaine  2 patch Transdermal Q24H    lipase-protease-amylase 12,000-38,000-60,000 units  2 capsule Oral TID WM    omega-3 acid ethyl esters  2 g Oral Daily with breakfast    pantoprazole  40 mg Oral BID    promethazine (PHENERGAN) IVPB  12.5 mg Intravenous Once    propranolol  5 mg Oral BID    rifAXImin  550 mg Oral BID    sertraline  50 mg Oral Daily    sodium bicarbonate  1,300 mg Oral BID     Continuous Infusions:  PRN Meds:acetaminophen, diphenhydrAMINE-zinc acetate 1-0.1%, lactulose, lactulose, metoclopramide HCl, ondansetron, sodium chloride 0.9%    Review of Systems   Constitutional: Positive for appetite change (wife reports, patient does not much) and fatigue. Negative for activity change, chills and fever.   HENT: Negative for mouth sores, sore throat and trouble swallowing.    Eyes: Negative for visual disturbance.   Respiratory: Positive for shortness of breath (upon activity).    Cardiovascular: Positive for leg swelling. Negative for chest pain.   Endocrine: Negative for polydipsia and polyuria.   Genitourinary: Negative for decreased urine volume and difficulty urinating.   Musculoskeletal: Positive for arthralgias (both shoulders have been hurting for last 2 weeks, right > left).   Skin: Negative for rash.   Neurological: Positive for weakness. Negative for dizziness, syncope and headaches.   Hematological: Bruises/bleeds easily.   Psychiatric/Behavioral: Negative for agitation, confusion, hallucinations and sleep disturbance.     Objective:     Vital Signs (Most Recent):  Temp: 98 °F (36.7 °C) (07/05/18 1127)  Pulse: 68 (07/05/18 1127)  Resp: 18 (07/05/18 1127)  BP: 134/60 (07/05/18 1127)  SpO2: 96 % (07/05/18 1127) Vital Signs (24h Range):  Temp:   [97.9 °F (36.6 °C)-98.4 °F (36.9 °C)] 98 °F (36.7 °C)  Pulse:  [61-85] 68  Resp:  [16-20] 18  SpO2:  [96 %-99 %] 96 %  BP: (120-137)/(58-68) 134/60     Weight: (!) 140.3 kg (309 lb 4.9 oz)  Body mass index is 39.71 kg/m².    Intake/Output - Last 3 Shifts       07/03 0700 - 07/04 0659 07/04 0700 - 07/05 0659 07/05 0700 - 07/06 0659    P.O. 885 1640     Total Intake(mL/kg) 885 (6.3) 1640 (11.7)     Urine (mL/kg/hr) 1200 (0.4) 200 (0.1)     Emesis/NG output  0 (0)     Other  0 (0)     Stool  0 (0)     Blood  0 (0)     Total Output 1200 200      Net -315 +1440             Urine Occurrence 0 x 5 x     Stool Occurrence 4 x 3 x     Emesis Occurrence 0 x 2 x           Physical Exam   Constitutional: He is oriented to person, place, and time. He appears well-developed and well-nourished.   HENT:   Head: Normocephalic and atraumatic.   Mouth/Throat: No oropharyngeal exudate.   Eyes: EOM are normal. Pupils are equal, round, and reactive to light. Scleral icterus is present.   Neck: No JVD present.   Cardiovascular: Exam reveals no gallop and no friction rub.    Murmur heard.  Pulmonary/Chest: Effort normal and breath sounds normal. No stridor. No respiratory distress. He has no wheezes. He has no rales.   Abdominal: Soft. Bowel sounds are normal. He exhibits distension. He exhibits no mass.   Musculoskeletal: He exhibits edema. He exhibits no tenderness.   Lymphadenopathy:     He has no cervical adenopathy.   Neurological: He is alert and oriented to person, place, and time.   Skin: Skin is warm and dry. No rash noted. No erythema.   Psychiatric: He has a normal mood and affect. His behavior is normal.       Laboratory:  Immunosuppressants     None        CBC:   Recent Labs  Lab 07/05/18  0521   WBC 3.94   RBC 2.47*   HGB 8.3*   HCT 25.2*   PLT 46*   *   MCH 33.6*   MCHC 32.9     CMP:   Recent Labs  Lab 07/05/18  0521   *   CALCIUM 9.1   ALBUMIN 2.7*   PROT 4.9*      K 4.0   CO2 19*   *   BUN 38*    CREATININE 2.1*   ALKPHOS 75   ALT 38   AST 54*   BILITOT 2.5*     Coagulation:   Recent Labs  Lab 07/05/18  0521   INR 1.6*     Labs within the past 24 hours have been reviewed.    Diagnostic Results:  pertinent imaging reivewed

## 2018-07-05 NOTE — ASSESSMENT & PLAN NOTE
- completed vanc x 7 days for BLE cellulitis  - Cr elevated but stable.   - edema improved with gentle diuresis and albumin PRN

## 2018-07-06 ENCOUNTER — TELEPHONE (OUTPATIENT)
Dept: TRANSPLANT | Facility: CLINIC | Age: 63
End: 2018-07-06

## 2018-07-06 LAB
ALBUMIN SERPL BCP-MCNC: 3.2 G/DL
ALP SERPL-CCNC: 96 U/L
ALT SERPL W/O P-5'-P-CCNC: 45 U/L
ANION GAP SERPL CALC-SCNC: 8 MMOL/L
ANISOCYTOSIS BLD QL SMEAR: SLIGHT
AST SERPL-CCNC: 69 U/L
BACTERIA #/AREA URNS AUTO: ABNORMAL /HPF
BASOPHILS # BLD AUTO: 0.06 K/UL
BASOPHILS NFR BLD: 2 %
BILIRUB SERPL-MCNC: 2.6 MG/DL
BILIRUB UR QL STRIP: NEGATIVE
BUN SERPL-MCNC: 37 MG/DL
BURR CELLS BLD QL SMEAR: ABNORMAL
CALCIUM SERPL-MCNC: 9.6 MG/DL
CHLORIDE SERPL-SCNC: 117 MMOL/L
CHLORIDE UR-SCNC: <20 MMOL/L
CLARITY UR REFRACT.AUTO: ABNORMAL
CO2 SERPL-SCNC: 15 MMOL/L
COLOR UR AUTO: YELLOW
CREAT SERPL-MCNC: 2.1 MG/DL
CREAT UR-MCNC: 106 MG/DL
DIFFERENTIAL METHOD: ABNORMAL
EOSINOPHIL # BLD AUTO: 0.4 K/UL
EOSINOPHIL NFR BLD: 14.1 %
ERYTHROCYTE [DISTWIDTH] IN BLOOD BY AUTOMATED COUNT: 19.8 %
EST. GFR  (AFRICAN AMERICAN): 37.9 ML/MIN/1.73 M^2
EST. GFR  (NON AFRICAN AMERICAN): 32.7 ML/MIN/1.73 M^2
GLUCOSE SERPL-MCNC: 150 MG/DL
GLUCOSE UR QL STRIP: NEGATIVE
HCT VFR BLD AUTO: 30.9 %
HGB BLD-MCNC: 9.9 G/DL
HGB UR QL STRIP: ABNORMAL
HYALINE CASTS UR QL AUTO: 15 /LPF
HYPOCHROMIA BLD QL SMEAR: ABNORMAL
IMM GRANULOCYTES # BLD AUTO: 0.04 K/UL
IMM GRANULOCYTES NFR BLD AUTO: 1.3 %
INR PPP: 1.5
KETONES UR QL STRIP: NEGATIVE
LEUKOCYTE ESTERASE UR QL STRIP: ABNORMAL
LYMPHOCYTES # BLD AUTO: 0.7 K/UL
LYMPHOCYTES NFR BLD: 24.5 %
MAGNESIUM SERPL-MCNC: 2.2 MG/DL
MCH RBC QN AUTO: 34.5 PG
MCHC RBC AUTO-ENTMCNC: 32 G/DL
MCV RBC AUTO: 108 FL
MICROSCOPIC COMMENT: ABNORMAL
MONOCYTES # BLD AUTO: 0.6 K/UL
MONOCYTES NFR BLD: 21.1 %
NEUTROPHILS # BLD AUTO: 1.1 K/UL
NEUTROPHILS NFR BLD: 37 %
NITRITE UR QL STRIP: NEGATIVE
NRBC BLD-RTO: 0 /100 WBC
OSMOLALITY UR: 428 MOSM/KG
OVALOCYTES BLD QL SMEAR: ABNORMAL
PH UR STRIP: 6 [PH] (ref 5–8)
PLATELET # BLD AUTO: 48 K/UL
PLATELET BLD QL SMEAR: ABNORMAL
PMV BLD AUTO: 13.5 FL
POIKILOCYTOSIS BLD QL SMEAR: SLIGHT
POLYCHROMASIA BLD QL SMEAR: ABNORMAL
POTASSIUM SERPL-SCNC: 4.2 MMOL/L
POTASSIUM UR-SCNC: 16 MMOL/L
PROT SERPL-MCNC: 5.5 G/DL
PROT UR QL STRIP: NEGATIVE
PROTHROMBIN TIME: 15.2 SEC
RBC # BLD AUTO: 2.87 M/UL
RBC #/AREA URNS AUTO: >100 /HPF (ref 0–4)
SODIUM SERPL-SCNC: 140 MMOL/L
SODIUM UR-SCNC: <20 MMOL/L
SP GR UR STRIP: 1.01 (ref 1–1.03)
SQUAMOUS #/AREA URNS AUTO: 2 /HPF
TARGETS BLD QL SMEAR: ABNORMAL
URN SPEC COLLECT METH UR: ABNORMAL
UROBILINOGEN UR STRIP-ACNC: NEGATIVE EU/DL
UUN UR-MCNC: 656 MG/DL
WBC # BLD AUTO: 2.98 K/UL
WBC #/AREA URNS AUTO: 89 /HPF (ref 0–5)

## 2018-07-06 PROCEDURE — 80053 COMPREHEN METABOLIC PANEL: CPT | Mod: NTX

## 2018-07-06 PROCEDURE — 63600175 PHARM REV CODE 636 W HCPCS: Mod: JG,NTX | Performed by: NURSE PRACTITIONER

## 2018-07-06 PROCEDURE — 25000003 PHARM REV CODE 250: Mod: NTX | Performed by: PHYSICIAN ASSISTANT

## 2018-07-06 PROCEDURE — 82436 ASSAY OF URINE CHLORIDE: CPT | Mod: NTX

## 2018-07-06 PROCEDURE — 20600001 HC STEP DOWN PRIVATE ROOM: Mod: NTX

## 2018-07-06 PROCEDURE — 83735 ASSAY OF MAGNESIUM: CPT | Mod: NTX

## 2018-07-06 PROCEDURE — 63600175 PHARM REV CODE 636 W HCPCS: Mod: NTX | Performed by: NURSE PRACTITIONER

## 2018-07-06 PROCEDURE — 85025 COMPLETE CBC W/AUTO DIFF WBC: CPT | Mod: NTX

## 2018-07-06 PROCEDURE — 82570 ASSAY OF URINE CREATININE: CPT | Mod: NTX

## 2018-07-06 PROCEDURE — 97116 GAIT TRAINING THERAPY: CPT | Mod: NTX

## 2018-07-06 PROCEDURE — 63600175 PHARM REV CODE 636 W HCPCS: Mod: NTX | Performed by: PHYSICIAN ASSISTANT

## 2018-07-06 PROCEDURE — 25000003 PHARM REV CODE 250: Mod: NTX | Performed by: INTERNAL MEDICINE

## 2018-07-06 PROCEDURE — 84133 ASSAY OF URINE POTASSIUM: CPT | Mod: NTX

## 2018-07-06 PROCEDURE — 83935 ASSAY OF URINE OSMOLALITY: CPT | Mod: NTX

## 2018-07-06 PROCEDURE — 99232 SBSQ HOSP IP/OBS MODERATE 35: CPT | Mod: NTX,,, | Performed by: NURSE PRACTITIONER

## 2018-07-06 PROCEDURE — P9047 ALBUMIN (HUMAN), 25%, 50ML: HCPCS | Mod: JG,NTX | Performed by: NURSE PRACTITIONER

## 2018-07-06 PROCEDURE — 25000003 PHARM REV CODE 250: Mod: NTX | Performed by: NURSE PRACTITIONER

## 2018-07-06 PROCEDURE — 36415 COLL VENOUS BLD VENIPUNCTURE: CPT | Mod: NTX

## 2018-07-06 PROCEDURE — 84540 ASSAY OF URINE/UREA-N: CPT | Mod: NTX

## 2018-07-06 PROCEDURE — 81001 URINALYSIS AUTO W/SCOPE: CPT | Mod: NTX

## 2018-07-06 PROCEDURE — 97530 THERAPEUTIC ACTIVITIES: CPT | Mod: NTX

## 2018-07-06 PROCEDURE — 85610 PROTHROMBIN TIME: CPT | Mod: NTX

## 2018-07-06 PROCEDURE — 84300 ASSAY OF URINE SODIUM: CPT | Mod: NTX

## 2018-07-06 PROCEDURE — 87086 URINE CULTURE/COLONY COUNT: CPT | Mod: NTX

## 2018-07-06 RX ORDER — SODIUM BICARBONATE 650 MG/1
1300 TABLET ORAL 3 TIMES DAILY
Status: DISCONTINUED | OUTPATIENT
Start: 2018-07-06 | End: 2018-07-12

## 2018-07-06 RX ORDER — ALBUMIN HUMAN 250 G/1000ML
25 SOLUTION INTRAVENOUS ONCE
Status: CANCELLED | OUTPATIENT
Start: 2018-07-06 | End: 2018-07-06

## 2018-07-06 RX ORDER — ALBUMIN HUMAN 250 G/1000ML
25 SOLUTION INTRAVENOUS ONCE
Status: COMPLETED | OUTPATIENT
Start: 2018-07-06 | End: 2018-07-06

## 2018-07-06 RX ADMIN — PROPRANOLOL HYDROCHLORIDE 5 MG: 20 SOLUTION ORAL at 08:07

## 2018-07-06 RX ADMIN — PANTOPRAZOLE SODIUM 40 MG: 40 TABLET, DELAYED RELEASE ORAL at 09:07

## 2018-07-06 RX ADMIN — LIDOCAINE 2 PATCH: 50 PATCH TOPICAL at 10:07

## 2018-07-06 RX ADMIN — HEPARIN SODIUM 5000 UNITS: 5000 INJECTION, SOLUTION INTRAVENOUS; SUBCUTANEOUS at 05:07

## 2018-07-06 RX ADMIN — FLUCONAZOLE 200 MG: 200 TABLET ORAL at 09:07

## 2018-07-06 RX ADMIN — SODIUM BICARBONATE 650 MG TABLET 1300 MG: at 08:07

## 2018-07-06 RX ADMIN — LACTULOSE 45 G: 20 SOLUTION ORAL at 02:07

## 2018-07-06 RX ADMIN — ASPIRIN 81 MG: 81 TABLET, COATED ORAL at 09:07

## 2018-07-06 RX ADMIN — ONDANSETRON 8 MG: 8 TABLET, ORALLY DISINTEGRATING ORAL at 07:07

## 2018-07-06 RX ADMIN — HEPARIN SODIUM 5000 UNITS: 5000 INJECTION, SOLUTION INTRAVENOUS; SUBCUTANEOUS at 02:07

## 2018-07-06 RX ADMIN — ALBUMIN HUMAN 25 G: 0.25 SOLUTION INTRAVENOUS at 10:07

## 2018-07-06 RX ADMIN — METOCLOPRAMIDE 10 MG: 5 INJECTION, SOLUTION INTRAMUSCULAR; INTRAVENOUS at 08:07

## 2018-07-06 RX ADMIN — RIFAXIMIN 550 MG: 550 TABLET ORAL at 08:07

## 2018-07-06 RX ADMIN — SODIUM BICARBONATE 650 MG TABLET 1300 MG: at 02:07

## 2018-07-06 RX ADMIN — OMEGA-3-ACID ETHYL ESTERS 2 G: 1 CAPSULE, LIQUID FILLED ORAL at 08:07

## 2018-07-06 RX ADMIN — LACTULOSE 45 G: 20 SOLUTION ORAL at 08:07

## 2018-07-06 RX ADMIN — SERTRALINE HYDROCHLORIDE 50 MG: 50 TABLET ORAL at 09:07

## 2018-07-06 RX ADMIN — HEPARIN SODIUM 5000 UNITS: 5000 INJECTION, SOLUTION INTRAVENOUS; SUBCUTANEOUS at 08:07

## 2018-07-06 RX ADMIN — LACTULOSE 45 G: 10 SOLUTION ORAL at 02:07

## 2018-07-06 RX ADMIN — ONDANSETRON 8 MG: 8 TABLET, ORALLY DISINTEGRATING ORAL at 03:07

## 2018-07-06 RX ADMIN — LEVETIRACETAM 500 MG: 500 TABLET, FILM COATED ORAL at 08:07

## 2018-07-06 RX ADMIN — PROPRANOLOL HYDROCHLORIDE 5 MG: 20 SOLUTION ORAL at 09:07

## 2018-07-06 RX ADMIN — PANTOPRAZOLE SODIUM 40 MG: 40 TABLET, DELAYED RELEASE ORAL at 08:07

## 2018-07-06 RX ADMIN — PANCRELIPASE 2 CAPSULE: 60000; 12000; 38000 CAPSULE, DELAYED RELEASE PELLETS ORAL at 08:07

## 2018-07-06 RX ADMIN — PANCRELIPASE 2 CAPSULE: 60000; 12000; 38000 CAPSULE, DELAYED RELEASE PELLETS ORAL at 12:07

## 2018-07-06 RX ADMIN — LEVETIRACETAM 500 MG: 500 TABLET, FILM COATED ORAL at 09:07

## 2018-07-06 RX ADMIN — RIFAXIMIN 550 MG: 550 TABLET ORAL at 09:07

## 2018-07-06 RX ADMIN — LACTULOSE 45 G: 20 SOLUTION ORAL at 09:07

## 2018-07-06 RX ADMIN — SODIUM BICARBONATE 650 MG TABLET 1300 MG: at 09:07

## 2018-07-06 NOTE — ASSESSMENT & PLAN NOTE
- Placed status 7 on 6/26 for prescription drug coverage change. Re-activated 7/3, MELD 22. Listing tenuous due to frailty and malnutrition.    - propanolol decreased to 5mg BID, monitor BP closely    MELD-Na score: 22 at 7/6/2018  5:38 AM  MELD score: 22 at 7/6/2018  5:38 AM  Calculated from:  Serum Creatinine: 2.1 mg/dL at 7/6/2018  5:37 AM  Serum Sodium: 140 mmol/L (Rounded to 137) at 7/6/2018  5:37 AM  Total Bilirubin: 2.6 mg/dL at 7/6/2018  5:37 AM  INR(ratio): 1.5 at 7/6/2018  5:38 AM  Age: 63 years

## 2018-07-06 NOTE — PLAN OF CARE
Problem: Physical Therapy Goal  Goal: Physical Therapy Goal  Goals to be met by: 18     Patient will increase functional independence with mobility by performin. Supine to sit with Stand-by Assistance  2. Sit to stand transfer with Stand-by Assistance - met   2a. Sit to stand with S  3. Gait  x 100 feet with Contact Guard Assistance using Rolling Walker. - met   3a. Gait x200 ft with Supervision with RW - GOAL MET   4. Ascend/descend 5 stair with right Handrails Contact Guard Assistance. - GOAL MET   4a. Ascend/descend 5 stair with right Handrails with SBA  5. Lower extremity exercise program x 20 reps, with supervision, in order to increase LE strength and (I) with functional mobility.   6. Pt to perf 6MWT: amb 440', to demonstrate a clinically significant improvement in aerobic capacity.            Outcome: Ongoing (interventions implemented as appropriate)  Goals reviewed and remain appropriate. Pt progressing towards goals.    Lucille Craig, PT, DPT   2018  [phone]

## 2018-07-06 NOTE — ASSESSMENT & PLAN NOTE
- VICKY initially improving with albumin infusion but with minimal intake 6/25 and 6/26.  - Ur Na: 32, unlikely for HRS. Repeat urine lytes 7/6.  - Bicarb 17, start Na Bicarb 1300 mg TID  - Cr remains elevated 2.1, holding diuretics (previously lasix 40mg daily). Albumin x 1.

## 2018-07-06 NOTE — SUBJECTIVE & OBJECTIVE
Scheduled Meds:   aspirin  81 mg Oral Daily    ergocalciferol  50,000 Units Oral Q7 Days    fluconazole  200 mg Oral Daily    heparin (porcine)  5,000 Units Subcutaneous Q8H    lactulose  45 g Oral TID    levETIRAcetam  500 mg Oral BID    lidocaine  2 patch Transdermal Q24H    lipase-protease-amylase 12,000-38,000-60,000 units  2 capsule Oral TID WM    omega-3 acid ethyl esters  2 g Oral Daily with breakfast    pantoprazole  40 mg Oral BID    promethazine (PHENERGAN) IVPB  12.5 mg Intravenous Once    propranolol  5 mg Oral BID    rifAXImin  550 mg Oral BID    sertraline  50 mg Oral Daily    sodium bicarbonate  1,300 mg Oral TID     Continuous Infusions:  PRN Meds:acetaminophen, diphenhydrAMINE-zinc acetate 1-0.1%, lactulose, lactulose, metoclopramide HCl, ondansetron, sodium chloride 0.9%    Review of Systems   Constitutional: Positive for appetite change (wife reports, patient does not much) and fatigue. Negative for activity change, chills and fever.   HENT: Negative for mouth sores, sore throat and trouble swallowing.    Eyes: Negative for visual disturbance.   Respiratory: Positive for shortness of breath (upon activity).    Cardiovascular: Positive for leg swelling. Negative for chest pain.   Endocrine: Negative for polydipsia and polyuria.   Genitourinary: Negative for decreased urine volume and difficulty urinating.   Musculoskeletal: Positive for arthralgias (both shoulders have been hurting for last 2 weeks, right > left).   Skin: Negative for rash.   Neurological: Positive for weakness. Negative for dizziness, syncope and headaches.   Hematological: Bruises/bleeds easily.   Psychiatric/Behavioral: Negative for agitation, confusion, hallucinations and sleep disturbance.     Objective:     Vital Signs (Most Recent):  Temp: 98 °F (36.7 °C) (07/06/18 0759)  Pulse: 67 (07/06/18 0759)  Resp: 18 (07/06/18 0759)  BP: 127/60 (07/06/18 0759)  SpO2: 97 % (07/06/18 0759) Vital Signs (24h Range):  Temp:   [97.9 °F (36.6 °C)-98.2 °F (36.8 °C)] 98 °F (36.7 °C)  Pulse:  [61-71] 67  Resp:  [15-20] 18  SpO2:  [97 %-98 %] 97 %  BP: (101-132)/(51-66) 127/60     Weight: 135 kg (297 lb 9.9 oz)  Body mass index is 38.21 kg/m².    Intake/Output - Last 3 Shifts       07/04 0700 - 07/05 0659 07/05 0700 - 07/06 0659 07/06 0700 - 07/07 0659    P.O. 1640 1500     Total Intake(mL/kg) 1640 (11.7) 1500 (11.1)     Urine (mL/kg/hr) 200 (0.1) 450 (0.1)     Emesis/NG output 0 (0)      Other 0 (0)      Stool 0 (0)      Blood 0 (0)      Total Output 200 450      Net +1440 +1050             Urine Occurrence 5 x 4 x     Stool Occurrence 3 x 5 x     Emesis Occurrence 2 x 2 x           Physical Exam   Constitutional: He is oriented to person, place, and time. He appears well-developed and well-nourished.   HENT:   Head: Normocephalic and atraumatic.   Mouth/Throat: No oropharyngeal exudate.   Eyes: EOM are normal. Pupils are equal, round, and reactive to light. Scleral icterus is present.   Neck: No JVD present.   Cardiovascular: Exam reveals no gallop and no friction rub.    Murmur heard.  Pulmonary/Chest: Effort normal and breath sounds normal. No stridor. No respiratory distress. He has no wheezes. He has no rales.   Abdominal: Soft. Bowel sounds are normal. He exhibits distension. He exhibits no mass.   Musculoskeletal: He exhibits edema (+2/+3 BLE). He exhibits no tenderness.   Lymphadenopathy:     He has no cervical adenopathy.   Neurological: He is alert and oriented to person, place, and time.   Skin: Skin is warm and dry. No rash noted. No erythema.   Psychiatric: He has a normal mood and affect. His behavior is normal. His speech is delayed.       Laboratory:  Immunosuppressants     None        CBC:   Recent Labs  Lab 07/06/18  0538   WBC 2.98*   RBC 2.87*   HGB 9.9*   HCT 30.9*   PLT 48*   *   MCH 34.5*   MCHC 32.0     CMP:   Recent Labs  Lab 07/06/18  0537   *   CALCIUM 9.6   ALBUMIN 3.2*   PROT 5.5*      K 4.2    CO2 15*   *   BUN 37*   CREATININE 2.1*   ALKPHOS 96   ALT 45*   AST 69*   BILITOT 2.6*     Coagulation:   Recent Labs  Lab 07/06/18  0538   INR 1.5*     Labs within the past 24 hours have been reviewed.    Diagnostic Results:  I have personally reviewed all pertinent imaging studies.

## 2018-07-06 NOTE — ASSESSMENT & PLAN NOTE
"- Dietary consulted for calorie count.  - Pt eating poorly accord to wife.  Does take boost supplement.  - "pre-hab" regimen and supplements ordered.   - Patient drinking at least 2-3 boost supplements, protein shake, and benoprotein daily.   - If continues to have low intake, consider starting Megace.   "

## 2018-07-06 NOTE — PT/OT/SLP PROGRESS
"Physical Therapy Treatment    Patient Name:  Alon Adamson   MRN:  [id number]    Recommendations:     Discharge Recommendations:  home health PT   Discharge Equipment Recommendations: none   Barriers to discharge: Inaccessible home (stairs to enter)    Assessment:     Alon Adamson is a 63 y.o. male admitted with a medical diagnosis of Acute kidney injury superimposed on chronic kidney disease.  He presents with the following impairments/functional limitations:  impaired skin, edema, decreased safety awareness, decreased ROM, decreased lower extremity function, decreased upper extremity function, decreased coordination, impaired balance, impaired self care skills, weakness, impaired functional mobilty, gait instability. Pt able to ambulate in hallway this date; however, distance limited compared to recent sessions 2* fatigue and weakness. Pt also with increased BLE edema, causing difficulty advancing LE during gait. Pt would continue to benefit from skilled acute PT in order to address current deficits and progress functional mobility.     Rehab Prognosis:  Fair-good; patient would benefit from acute skilled PT services to address these deficits and reach maximum level of function.      Recent Surgery: * No surgery found *      Plan:     During this hospitalization, patient to be seen 4 x/week to address the above listed problems via gait training, therapeutic activities, therapeutic exercises, neuromuscular re-education  · Plan of Care Expires:  07/23/18   Plan of Care Reviewed with: patient, son, mother    Subjective     Communicated with RN prior to session.  Patient found sitting in w/c upon PT entry to room, agreeable to treatment.  PT had previously attempted x2, but pt in bathroom at both attempts. Agreeable at 3rd attempt.     Chief Complaint: fatigue and weakness   Patient comments/goals: "I was tired when we started this."  Pain/Comfort:  · Pain Rating 1: 0/10    Patients cultural, spiritual, " Roman Catholic conflicts given the current situation: none noted     Objective:     Patient found with:  (no lines connected during session)     General Precautions: Standard, fall   Orthopedic Precautions:N/A   Braces: N/A     Functional Mobility:  · Bed Mobility:     · Sit to Supine: minimum assistance with HOB elevated (managing LE)  · Transfers:     · Sit to Stand:  contact guard assistance with x1 rep with RW, x1 rep with HHA  · Chair to Bed: CGA with HHA with stand pivot   · Gait: 88 ft. with CGA and RW; w/c follow throughout; returned to room via chair  · Decreased antoine, decreased step length, impaired weight-shifting ability, decreased toe-floor clearance      AM-PAC 6 CLICK MOBILITY  Turning over in bed (including adjusting bedclothes, sheets and blankets)?: 3  Sitting down on and standing up from a chair with arms (e.g., wheelchair, bedside commode, etc.): 3  Moving from lying on back to sitting on the side of the bed?: 3  Moving to and from a bed to a chair (including a wheelchair)?: 3  Need to walk in hospital room?: 3  Climbing 3-5 steps with a railing?: 2  Basic Mobility Total Score: 17       Therapeutic Activities and Exercises:   Pt and family educated on the importance of ambulation and OOB activity. All verbalized understanding, but likely need further reinforcement.  Pt taken to hallway via w/c for increased ease of gait training. Performed ambulation in hallway as described above. Pt left sitting in front of window to look at river per pt request. PT then returned pt to room via w/c and assisted pt back to bed.   RN notified of BLE weeping edema    Patient left supine with all lines intact, call button in reach, RN notified and pt's son and mother present..    GOALS:    Physical Therapy Goals        Problem: Physical Therapy Goal    Goal Priority Disciplines Outcome Goal Variances Interventions   Physical Therapy Goal     PT/OT, PT Ongoing (interventions implemented as appropriate)     Description:   Goals to be met by: 18     Patient will increase functional independence with mobility by performin. Supine to sit with Stand-by Assistance  2. Sit to stand transfer with Stand-by Assistance - met   2a. Sit to stand with S  3. Gait  x 100 feet with Contact Guard Assistance using Rolling Walker. - met   3a. Gait x200 ft with Supervision with RW - GOAL MET   4. Ascend/descend 5 stair with right Handrails Contact Guard Assistance. - GOAL MET   4a. Ascend/descend 5 stair with right Handrails with SBA  5. Lower extremity exercise program x 20 reps, with supervision, in order to increase LE strength and (I) with functional mobility.   6. Pt to perf 6MWT: amb 440', to demonstrate a clinically significant improvement in aerobic capacity.                              Time Tracking:     PT Received On: 18  PT Start Time: 1501     PT Stop Time: 1523  PT Total Time (min): 22 min   +PT returned from 15:33-15:41 (8 minutes)  Total Treatment Time: 30 minutes     Billable Minutes: Gait Training 15 and Therapeutic Activity 15    Treatment Type: Treatment  PT/PTA: PT     PTA Visit Number: 0     Lucille Craig, PT, DPT   2018  [phone]

## 2018-07-06 NOTE — ASSESSMENT & PLAN NOTE
- Acute on chronic  - improved from grade 3 to 1-2 with lactulose enemas 6/25 + 6/26, followed by oral lactulose, xifaxan.  - continue rifaximin 550 mg BID.  - infx workup initiated: blood/urine/ascites. Started on cefepime/vanc, d/c'd 7/1. Blood Cultures 6/27 are neg to date.   - adjusting lactulose regimen to prevent acute HE. PRN lactulose ordered in addition to scheduled dosing.   - cannot skip lactulose, easily becomes encephalopathic

## 2018-07-06 NOTE — TELEPHONE ENCOUNTER
PATIENT NAME: Alon Muller [facility] #: [id number]    Lab Results   Component Value Date    CREATININE 2.1 (H) 07/06/2018     07/06/2018    BILITOT 2.6 (H) 07/06/2018    ALBUMIN 3.2 (L) 07/06/2018    INR 1.5 (H) 07/06/2018     MELD 22  Encephalopathy: 1 - 2  Ascites: slight  Dialysis: no     Recertification requestor: Yady Dietz

## 2018-07-06 NOTE — ASSESSMENT & PLAN NOTE
- completed vanc x 7 days for BLE cellulitis  - Cr elevated but stable.   - edema improved with gentle diuresis and albumin PRN, holding diuretics for elevated Cr

## 2018-07-07 ENCOUNTER — TELEPHONE (OUTPATIENT)
Dept: TRANSPLANT | Facility: CLINIC | Age: 63
End: 2018-07-07

## 2018-07-07 LAB
ALBUMIN SERPL BCP-MCNC: 3.3 G/DL
ALP SERPL-CCNC: 80 U/L
ALT SERPL W/O P-5'-P-CCNC: 42 U/L
ANION GAP SERPL CALC-SCNC: 12 MMOL/L
AST SERPL-CCNC: 56 U/L
BASOPHILS # BLD AUTO: 0.07 K/UL
BASOPHILS NFR BLD: 2 %
BILIRUB SERPL-MCNC: 3.2 MG/DL
BUN SERPL-MCNC: 40 MG/DL
CALCIUM SERPL-MCNC: 9.8 MG/DL
CHLORIDE SERPL-SCNC: 117 MMOL/L
CO2 SERPL-SCNC: 16 MMOL/L
CREAT SERPL-MCNC: 1.9 MG/DL
DIFFERENTIAL METHOD: ABNORMAL
EOSINOPHIL # BLD AUTO: 0.5 K/UL
EOSINOPHIL NFR BLD: 15.1 %
ERYTHROCYTE [DISTWIDTH] IN BLOOD BY AUTOMATED COUNT: 19.4 %
EST. GFR  (AFRICAN AMERICAN): 42.7 ML/MIN/1.73 M^2
EST. GFR  (NON AFRICAN AMERICAN): 37 ML/MIN/1.73 M^2
GLUCOSE SERPL-MCNC: 117 MG/DL
HCT VFR BLD AUTO: 28.3 %
HGB BLD-MCNC: 9.7 G/DL
IMM GRANULOCYTES # BLD AUTO: 0.01 K/UL
IMM GRANULOCYTES NFR BLD AUTO: 0.3 %
INR PPP: 1.6
LYMPHOCYTES # BLD AUTO: 0.8 K/UL
LYMPHOCYTES NFR BLD: 22.1 %
MAGNESIUM SERPL-MCNC: 2.2 MG/DL
MCH RBC QN AUTO: 34.8 PG
MCHC RBC AUTO-ENTMCNC: 34.3 G/DL
MCV RBC AUTO: 101 FL
MONOCYTES # BLD AUTO: 0.6 K/UL
MONOCYTES NFR BLD: 17.4 %
NEUTROPHILS # BLD AUTO: 1.5 K/UL
NEUTROPHILS NFR BLD: 43.1 %
NRBC BLD-RTO: 0 /100 WBC
PLATELET # BLD AUTO: 55 K/UL
PMV BLD AUTO: 13.8 FL
POTASSIUM SERPL-SCNC: 4 MMOL/L
PROT SERPL-MCNC: 5.6 G/DL
PROTHROMBIN TIME: 16.2 SEC
RBC # BLD AUTO: 2.79 M/UL
SODIUM SERPL-SCNC: 145 MMOL/L
WBC # BLD AUTO: 3.57 K/UL

## 2018-07-07 PROCEDURE — 83735 ASSAY OF MAGNESIUM: CPT | Mod: NTX

## 2018-07-07 PROCEDURE — 99233 SBSQ HOSP IP/OBS HIGH 50: CPT | Mod: NTX,,, | Performed by: INTERNAL MEDICINE

## 2018-07-07 PROCEDURE — 85025 COMPLETE CBC W/AUTO DIFF WBC: CPT | Mod: NTX

## 2018-07-07 PROCEDURE — 25000003 PHARM REV CODE 250: Mod: NTX | Performed by: NURSE PRACTITIONER

## 2018-07-07 PROCEDURE — 25000003 PHARM REV CODE 250: Mod: NTX | Performed by: PHYSICIAN ASSISTANT

## 2018-07-07 PROCEDURE — 25000003 PHARM REV CODE 250: Mod: NTX | Performed by: INTERNAL MEDICINE

## 2018-07-07 PROCEDURE — 80053 COMPREHEN METABOLIC PANEL: CPT | Mod: NTX

## 2018-07-07 PROCEDURE — 20600001 HC STEP DOWN PRIVATE ROOM: Mod: NTX

## 2018-07-07 PROCEDURE — 63600175 PHARM REV CODE 636 W HCPCS: Mod: NTX | Performed by: PHYSICIAN ASSISTANT

## 2018-07-07 PROCEDURE — 36415 COLL VENOUS BLD VENIPUNCTURE: CPT | Mod: NTX

## 2018-07-07 PROCEDURE — 85610 PROTHROMBIN TIME: CPT | Mod: NTX

## 2018-07-07 RX ADMIN — LEVETIRACETAM 500 MG: 500 TABLET, FILM COATED ORAL at 08:07

## 2018-07-07 RX ADMIN — ASPIRIN 81 MG: 81 TABLET, COATED ORAL at 08:07

## 2018-07-07 RX ADMIN — SODIUM BICARBONATE 650 MG TABLET 1300 MG: at 08:07

## 2018-07-07 RX ADMIN — LACTULOSE 45 G: 20 SOLUTION ORAL at 08:07

## 2018-07-07 RX ADMIN — LACTULOSE 45 G: 10 SOLUTION ORAL at 05:07

## 2018-07-07 RX ADMIN — LACTULOSE 45 G: 20 SOLUTION ORAL at 03:07

## 2018-07-07 RX ADMIN — PROPRANOLOL HYDROCHLORIDE 5 MG: 20 SOLUTION ORAL at 08:07

## 2018-07-07 RX ADMIN — RIFAXIMIN 550 MG: 550 TABLET ORAL at 08:07

## 2018-07-07 RX ADMIN — PANTOPRAZOLE SODIUM 40 MG: 40 TABLET, DELAYED RELEASE ORAL at 08:07

## 2018-07-07 RX ADMIN — RIFAXIMIN 550 MG: 550 TABLET ORAL at 09:07

## 2018-07-07 RX ADMIN — SODIUM BICARBONATE 650 MG TABLET 1300 MG: at 03:07

## 2018-07-07 RX ADMIN — LIDOCAINE 2 PATCH: 50 PATCH TOPICAL at 11:07

## 2018-07-07 RX ADMIN — FLUCONAZOLE 200 MG: 200 TABLET ORAL at 08:07

## 2018-07-07 RX ADMIN — HEPARIN SODIUM 5000 UNITS: 5000 INJECTION, SOLUTION INTRAVENOUS; SUBCUTANEOUS at 09:07

## 2018-07-07 RX ADMIN — LACTULOSE 45 G: 20 SOLUTION ORAL at 09:07

## 2018-07-07 RX ADMIN — SERTRALINE HYDROCHLORIDE 50 MG: 50 TABLET ORAL at 08:07

## 2018-07-07 RX ADMIN — LEVETIRACETAM 500 MG: 500 TABLET, FILM COATED ORAL at 09:07

## 2018-07-07 RX ADMIN — PROPRANOLOL HYDROCHLORIDE 5 MG: 20 SOLUTION ORAL at 09:07

## 2018-07-07 RX ADMIN — PANTOPRAZOLE SODIUM 40 MG: 40 TABLET, DELAYED RELEASE ORAL at 09:07

## 2018-07-07 RX ADMIN — OMEGA-3-ACID ETHYL ESTERS 2 G: 1 CAPSULE, LIQUID FILLED ORAL at 08:07

## 2018-07-07 RX ADMIN — HEPARIN SODIUM 5000 UNITS: 5000 INJECTION, SOLUTION INTRAVENOUS; SUBCUTANEOUS at 05:07

## 2018-07-07 RX ADMIN — PANCRELIPASE 2 CAPSULE: 60000; 12000; 38000 CAPSULE, DELAYED RELEASE PELLETS ORAL at 08:07

## 2018-07-07 RX ADMIN — SODIUM BICARBONATE 650 MG TABLET 1300 MG: at 09:07

## 2018-07-07 RX ADMIN — PANCRELIPASE 2 CAPSULE: 60000; 12000; 38000 CAPSULE, DELAYED RELEASE PELLETS ORAL at 05:07

## 2018-07-07 RX ADMIN — PANCRELIPASE 2 CAPSULE: 60000; 12000; 38000 CAPSULE, DELAYED RELEASE PELLETS ORAL at 11:07

## 2018-07-07 RX ADMIN — HEPARIN SODIUM 5000 UNITS: 5000 INJECTION, SOLUTION INTRAVENOUS; SUBCUTANEOUS at 03:07

## 2018-07-07 NOTE — SUBJECTIVE & OBJECTIVE
Scheduled Meds:   aspirin  81 mg Oral Daily    ergocalciferol  50,000 Units Oral Q7 Days    fluconazole  200 mg Oral Daily    heparin (porcine)  5,000 Units Subcutaneous Q8H    lactulose  45 g Oral TID    levETIRAcetam  500 mg Oral BID    lidocaine  2 patch Transdermal Q24H    lipase-protease-amylase 12,000-38,000-60,000 units  2 capsule Oral TID WM    omega-3 acid ethyl esters  2 g Oral Daily with breakfast    pantoprazole  40 mg Oral BID    promethazine (PHENERGAN) IVPB  12.5 mg Intravenous Once    propranolol  5 mg Oral BID    rifAXImin  550 mg Oral BID    sertraline  50 mg Oral Daily    sodium bicarbonate  1,300 mg Oral TID     Continuous Infusions:  PRN Meds:acetaminophen, diphenhydrAMINE-zinc acetate 1-0.1%, lactulose, lactulose, metoclopramide HCl, ondansetron, sodium chloride 0.9%    Review of Systems   Constitutional: Positive for appetite change and fatigue. Negative for activity change, chills, diaphoresis, fever and unexpected weight change.   HENT: Negative for congestion, facial swelling, hearing loss and sore throat.    Eyes: Negative for pain, discharge and visual disturbance.   Respiratory: Negative for cough, chest tightness, shortness of breath and wheezing.    Cardiovascular: Positive for leg swelling. Negative for chest pain and palpitations.   Gastrointestinal: Positive for diarrhea (with lactulose). Negative for abdominal distention, abdominal pain, blood in stool, constipation, nausea and vomiting.   Endocrine: Negative.    Genitourinary: Negative for decreased urine volume, dysuria and frequency.   Musculoskeletal: Negative for arthralgias and gait problem.   Skin: Negative for rash and wound.   Neurological: Positive for tremors. Negative for weakness and headaches.   Hematological: Negative.  Negative for adenopathy. Does not bruise/bleed easily.   Psychiatric/Behavioral: Positive for confusion.   All other systems reviewed and are negative.    Objective:     Vital Signs  (Most Recent):  Temp: 97.9 °F (36.6 °C) (07/07/18 0730)  Pulse: 62 (07/07/18 0730)  Resp: 16 (07/07/18 0730)  BP: (!) 118/55 (07/07/18 0730)  SpO2: 98 % (07/07/18 0730) Vital Signs (24h Range):  Temp:  [97.6 °F (36.4 °C)-98.1 °F (36.7 °C)] 97.9 °F (36.6 °C)  Pulse:  [57-70] 62  Resp:  [16-18] 16  SpO2:  [97 %-100 %] 98 %  BP: (111-151)/(55-67) 118/55     Weight: 132.8 kg (292 lb 12.3 oz)  Body mass index is 37.59 kg/m².    Intake/Output - Last 3 Shifts       07/05 0700 - 07/06 0659 07/06 0700 - 07/07 0659 07/07 0700 - 07/08 0659    P.O. 1500 1940     Total Intake(mL/kg) 1500 (11.1) 1940 (14.6)     Urine (mL/kg/hr) 450 (0.1) 450 (0.1)     Total Output 450 450      Net +1050 +1490             Urine Occurrence 4 x 5 x     Stool Occurrence 5 x 7 x     Emesis Occurrence 2 x 0 x           Physical Exam   Constitutional: He is oriented to person, place, and time. He appears lethargic. He is cooperative. He is easily aroused. No distress.   Temporal and distal extremity muscle wasting   HENT:   Head: Normocephalic and atraumatic.   Mouth/Throat: No oropharyngeal exudate.   Several broken teeth; poor dentition   Eyes: Scleral icterus is present.   Cardiovascular: Normal rate, regular rhythm, normal heart sounds, intact distal pulses and normal pulses.    Pulmonary/Chest: Effort normal. No respiratory distress. He has decreased breath sounds in the right lower field and the left lower field. He has no rales.   Abdominal: Soft. Bowel sounds are normal. He exhibits distension (ascites). There is no tenderness.   Musculoskeletal: He exhibits edema (2+ BLE).   Neurological: He is oriented to person, place, and time and easily aroused. He appears lethargic.   + asterixis   Skin: Skin is warm, dry and intact. He is not diaphoretic. There is erythema.   BLE crackling, scaly skin   Psychiatric:   + AAOx4, lethargic   Nursing note and vitals reviewed.      Laboratory:  Immunosuppressants     None        CBC:     Recent Labs  Lab  07/07/18 0518   WBC 3.57*   RBC 2.79*   HGB 9.7*   HCT 28.3*   PLT 55*   *   MCH 34.8*   MCHC 34.3     CMP:     Recent Labs  Lab 07/07/18 0518   *   CALCIUM 9.8   ALBUMIN 3.3*   PROT 5.6*      K 4.0   CO2 16*   *   BUN 40*   CREATININE 1.9*   ALKPHOS 80   ALT 42   AST 56*   BILITOT 3.2*     Coagulation:     Recent Labs  Lab 07/07/18 0518   INR 1.6*     Labs within the past 24 hours have been reviewed.    Diagnostic Results:  I have personally reviewed all pertinent imaging studies.Scheduled Meds:   aspirin  81 mg Oral Daily    ergocalciferol  50,000 Units Oral Q7 Days    fluconazole  200 mg Oral Daily    heparin (porcine)  5,000 Units Subcutaneous Q8H    lactulose  45 g Oral TID    levETIRAcetam  500 mg Oral BID    lidocaine  2 patch Transdermal Q24H    lipase-protease-amylase 12,000-38,000-60,000 units  2 capsule Oral TID WM    omega-3 acid ethyl esters  2 g Oral Daily with breakfast    pantoprazole  40 mg Oral BID    promethazine (PHENERGAN) IVPB  12.5 mg Intravenous Once    propranolol  5 mg Oral BID    rifAXImin  550 mg Oral BID    sertraline  50 mg Oral Daily    sodium bicarbonate  1,300 mg Oral TID     Continuous Infusions:  PRN Meds:acetaminophen, diphenhydrAMINE-zinc acetate 1-0.1%, lactulose, lactulose, metoclopramide HCl, ondansetron, sodium chloride 0.9%    Review of Systems   Constitutional: Positive for appetite change (mother reports patient eating better) and fatigue. Negative for activity change, chills and fever.   HENT: Negative for mouth sores, sore throat and trouble swallowing.    Eyes: Negative for visual disturbance.   Respiratory: Negative for shortness of breath (upon walking fast).    Cardiovascular: Positive for leg swelling. Negative for chest pain.   Endocrine: Negative for polydipsia and polyuria.   Genitourinary: Negative for decreased urine volume and difficulty urinating.   Musculoskeletal: Negative for arthralgias.   Skin: Negative for  rash.   Neurological: Positive for weakness. Negative for dizziness, syncope and headaches.   Hematological: Bruises/bleeds easily.   Psychiatric/Behavioral: Negative for agitation, confusion, hallucinations and sleep disturbance.     Objective:     Vital Signs (Most Recent):  Temp: 97.9 °F (36.6 °C) (07/07/18 0730)  Pulse: 62 (07/07/18 0730)  Resp: 16 (07/07/18 0730)  BP: (!) 118/55 (07/07/18 0730)  SpO2: 98 % (07/07/18 0730) Vital Signs (24h Range):  Temp:  [97.6 °F (36.4 °C)-98.1 °F (36.7 °C)] 97.9 °F (36.6 °C)  Pulse:  [57-70] 62  Resp:  [16-18] 16  SpO2:  [97 %-100 %] 98 %  BP: (111-151)/(55-67) 118/55     Weight: 132.8 kg (292 lb 12.3 oz)  Body mass index is 37.59 kg/m².    Intake/Output - Last 3 Shifts       07/05 0700 - 07/06 0659 07/06 0700 - 07/07 0659 07/07 0700 - 07/08 0659    P.O. 1500 1940     Total Intake(mL/kg) 1500 (11.1) 1940 (14.6)     Urine (mL/kg/hr) 450 (0.1) 450 (0.1)     Total Output 450 450      Net +1050 +1490             Urine Occurrence 4 x 5 x     Stool Occurrence 5 x 7 x     Emesis Occurrence 2 x 0 x           Physical Exam   Constitutional: He is oriented to person, place, and time. He appears well-developed and well-nourished.   HENT:   Head: Normocephalic and atraumatic.   Mouth/Throat: No oropharyngeal exudate.   Eyes: EOM are normal. Pupils are equal, round, and reactive to light. Scleral icterus is present.   Neck: No JVD present.   Cardiovascular: Exam reveals no gallop and no friction rub.    Murmur heard.  Pulmonary/Chest: Effort normal and breath sounds normal. No stridor. No respiratory distress. He has no wheezes. He has no rales.   Abdominal: Soft. Bowel sounds are normal. He exhibits distension (mild). He exhibits no mass.   Musculoskeletal: He exhibits edema. He exhibits no tenderness.   Lymphadenopathy:     He has no cervical adenopathy.   Neurological: He is alert and oriented to person, place, and time.   Skin: Skin is warm and dry. No rash noted. No erythema.    Psychiatric: He has a normal mood and affect. His behavior is normal.       Laboratory:  Immunosuppressants     None        CBC:     Recent Labs  Lab 07/07/18 0518   WBC 3.57*   RBC 2.79*   HGB 9.7*   HCT 28.3*   PLT 55*   *   MCH 34.8*   MCHC 34.3     CMP:     Recent Labs  Lab 07/07/18 0518   *   CALCIUM 9.8   ALBUMIN 3.3*   PROT 5.6*      K 4.0   CO2 16*   *   BUN 40*   CREATININE 1.9*   ALKPHOS 80   ALT 42   AST 56*   BILITOT 3.2*     Coagulation:     Recent Labs  Lab 07/07/18 0518   INR 1.6*     Labs within the past 24 hours have been reviewed.    MELD-Na score: 22 at 7/7/2018  5:18 AM  MELD score: 22 at 7/7/2018  5:18 AM  Calculated from:  Serum Creatinine: 1.9 mg/dL at 7/7/2018  5:18 AM  Serum Sodium: 145 mmol/L (Rounded to 137) at 7/7/2018  5:18 AM  Total Bilirubin: 3.2 mg/dL at 7/7/2018  5:18 AM  INR(ratio): 1.6 at 7/7/2018  5:18 AM  Age: 63 years    Will update MELD 22     Diagnostic Results:  US Liver Transplant Post:  No results found for this or any previous visit.  None

## 2018-07-07 NOTE — TELEPHONE ENCOUNTER
PATIENT NAME: Alon Muller [facility] #: [id number]    Lab Results   Component Value Date    CREATININE 1.9 (H) 07/07/2018     07/07/2018    BILITOT 3.2 (H) 07/07/2018    ALBUMIN 3.3 (L) 07/07/2018    INR 1.6 (H) 07/07/2018       Encephalopathy: 1 - 2  Ascites: moderate  Dialysis: no     Recertification requestor: Waleska Roland

## 2018-07-07 NOTE — PLAN OF CARE
Pt AAOx4, VSS, afebrile.  Pt c/o n/v, gave reglan PRN IV and zofran PO.  Pt denies c/o pain.  Pt ambulated to and from restroom with assistance from son.  Son and mother remains at pt bedside and active with pt care.  Bed in lowest locked position, call bell within reach, side rails up x2.

## 2018-07-07 NOTE — ASSESSMENT & PLAN NOTE
- Para 6/25 negative for infection.  - abd mildly distended, will likely need para next week (7/9)

## 2018-07-07 NOTE — ASSESSMENT & PLAN NOTE
- completed vanc x 7 days for BLE cellulitis  - Cr improved today.     - edema improved with gentle diuresis and albumin PRN, holding diuretics for elevated Cr

## 2018-07-07 NOTE — ASSESSMENT & PLAN NOTE
- Acute on chronic  - improved from grade 3 to 1-2 with lactulose, xifaxan.  - continue rifaximin 550 mg BID.  - infx workup blood/urine/ascites neg to date.  Has been off cefepime/vanc, d/c'd 7/1.   - adjusting lactulose regimen to prevent acute HE. PRN lactulose ordered in addition to scheduled dosing.   - cannot skip lactulose, easily becomes encephalopathic

## 2018-07-07 NOTE — PLAN OF CARE
Problem: Patient Care Overview  Goal: Plan of Care Review  Outcome: Ongoing (interventions implemented as appropriate)  Pt awake and alert. Pt off and on disoriented to time. Pt able to answer all oriented questions after a delay. Pt receiving lactulose three times a day. Pt walked in halls today.   Pt denies any n/v, SOB or pain.   Pt wearing nonskid socks/shoes when out of bed, bed in lowest position, side rails up x 2, call light within reach. Family at the bedside.

## 2018-07-07 NOTE — PROGRESS NOTES
[facility]  Liver Transplant  Progress Note    Patient Name: Alon Adamson  MRN: [id number]  Admission Date: 6/22/2018  Hospital Length of Stay: 15 days  Code Status: Full Code  Primary Care Provider: Mckinley Vides MD    Subjective:     History of Present Illness:  Mr. 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  Complications of liver disease include hyperbilirubinemia, hypoalbuminemia, severe malnutrition,   hepatic encephalopathy, thrombocytopenia, splenomegaly, ascites, hypervolemia, coagulopathy, portal HTN.  Pt recently admitted for severe hepatic encephalopathy requiring intubation but has been stable from mentation standpoint post resolution of acute enceophalopathy. Mr. Adamson was seen in clinic 6/22 and was noted to have significant hypervolemia, weeping from BLE, and VICKY on CKD3 on routine labs.  His MELD increased from 22 to 26 per labs- coordinator notified and MELD updated.  Cr elevated to 2.3 from baseline of 1.4.  Also with worsening ascites.  Pt reported increased pruritis, generalized fatigue and weakness.  He presented in wheelchair.  ROS otherwise negative.      Hospital Course:  Patient is listed for liver transplant, placed on internal hold 6/25 for HE then status 7 on 6/26 due to change in prescription coverage. He was re-activated on the list 7/3, MELD 22. Pt started on IV diuretics 6/22 and continued 6/23 and 6/24.  BLE edema and ascites signif improved with diuresis and kidney function also improved.  Pt noted to have increased asterixis  6/24. Upon admission, he was noted to have grade 1 hepatic encephalopathy which remained until 6/24 when grade 1-2 HE was noted.  Lactulose increased until pt had a BM- he had not had BM during previous day. On 6/25 AM, pt with signif worsened encephalopathy opening eyes to verbal and tactile stimuli only.  Pt given lactulose enemas, infx workup sent, started on IV abx (ceftriaxone, vanc), hydrated with  albumin, diuretics discontinued, CT head obtained and negative. Paracentesis 6/25 negative for peritonitis per cell count and U/A negative. Pt also with concern for BLE cellulitis- upper legs bright red with lower BLE still with dark appearance of venous stasis. Placed on vancomycin with improvement in BLE redness 6/26. Vanc continued for 7 days (ended 7/1). His HE waxed and waned for several days requiring lactulose enemas. Pt eating minimally during period of encephalopathy requiring gentle hydration with IVF 6/26. Of note, taco placement attempted 6/26 but unable secondary to agitation upon insertion prompting self resolving nose bleed. Micro lab called 6/26 PM with blood cx + for G+C in blood- pt continued on vanc which was started 6/25. ID consulted, suspect + blood cultures contaminant. EEG obtained 6/26 as pt with h/o serizures and negative. Pt continued on home Keppra. VICKY on admission initially improved with diuresis, but Cr sakina intermittently during admission likely related to aggressive diuresis. Of note, with chest pain overnight 6/26-6/27 that resolved without intervention and pt reported as mild.  EKG NSR with PVC, troponin 0.1 in setting of hypervolemia + VICKY.  Normal dobutamine stress 1/2018.  Cardiology consulted and felt not ACS, no need for further workup other than continue to treat current risk factors for CAD with ASA (pt already on ASA and statin as outpt) + BB for portal hypertension which was started.     Interval History: no acute events overnight. Grade I HE, AAOx4, mental status improved from last few days. Continues to work with PT/OT, walker at bedside, encourage OOB, sat in chair for awhile, walked with walker.  Appetite remains tenuous, drinking protein shakes TID and protein powder. Denies nausea, possibly related to ascites, likely will need para next week. Cr improved to 1.9 from 2.1, holding diuretics, hold albumin today. Increased bicarb supplement yesterday, will monitor, urine  Na <20, consistent with pre-renal or HRS, most likely HRS. Since creatinine improving and BP OK, will hold off on adding midodrine and octreotide.      Scheduled Meds:   aspirin  81 mg Oral Daily    ergocalciferol  50,000 Units Oral Q7 Days    fluconazole  200 mg Oral Daily    heparin (porcine)  5,000 Units Subcutaneous Q8H    lactulose  45 g Oral TID    levETIRAcetam  500 mg Oral BID    lidocaine  2 patch Transdermal Q24H    lipase-protease-amylase 12,000-38,000-60,000 units  2 capsule Oral TID WM    omega-3 acid ethyl esters  2 g Oral Daily with breakfast    pantoprazole  40 mg Oral BID    promethazine (PHENERGAN) IVPB  12.5 mg Intravenous Once    propranolol  5 mg Oral BID    rifAXImin  550 mg Oral BID    sertraline  50 mg Oral Daily    sodium bicarbonate  1,300 mg Oral TID     Continuous Infusions:  PRN Meds:acetaminophen, diphenhydrAMINE-zinc acetate 1-0.1%, lactulose, lactulose, metoclopramide HCl, ondansetron, sodium chloride 0.9%    Review of Systems   Constitutional: Positive for appetite change and fatigue. Negative for activity change, chills, diaphoresis, fever and unexpected weight change.   HENT: Negative for congestion, facial swelling, hearing loss and sore throat.    Eyes: Negative for pain, discharge and visual disturbance.   Respiratory: Negative for cough, chest tightness, shortness of breath and wheezing.    Cardiovascular: Positive for leg swelling. Negative for chest pain and palpitations.   Gastrointestinal: Positive for diarrhea (with lactulose). Negative for abdominal distention, abdominal pain, blood in stool, constipation, nausea and vomiting.   Endocrine: Negative.    Genitourinary: Negative for decreased urine volume, dysuria and frequency.   Musculoskeletal: Negative for arthralgias and gait problem.   Skin: Negative for rash and wound.   Neurological: Positive for tremors. Negative for weakness and headaches.   Hematological: Negative.  Negative for adenopathy. Does not  bruise/bleed easily.   Psychiatric/Behavioral: Positive for confusion.   All other systems reviewed and are negative.    Objective:     Vital Signs (Most Recent):  Temp: 97.9 °F (36.6 °C) (07/07/18 0730)  Pulse: 62 (07/07/18 0730)  Resp: 16 (07/07/18 0730)  BP: (!) 118/55 (07/07/18 0730)  SpO2: 98 % (07/07/18 0730) Vital Signs (24h Range):  Temp:  [97.6 °F (36.4 °C)-98.1 °F (36.7 °C)] 97.9 °F (36.6 °C)  Pulse:  [57-70] 62  Resp:  [16-18] 16  SpO2:  [97 %-100 %] 98 %  BP: (111-151)/(55-67) 118/55     Weight: 132.8 kg (292 lb 12.3 oz)  Body mass index is 37.59 kg/m².    Intake/Output - Last 3 Shifts       07/05 0700 - 07/06 0659 07/06 0700 - 07/07 0659 07/07 0700 - 07/08 0659    P.O. 1500 1940     Total Intake(mL/kg) 1500 (11.1) 1940 (14.6)     Urine (mL/kg/hr) 450 (0.1) 450 (0.1)     Total Output 450 450      Net +1050 +1490             Urine Occurrence 4 x 5 x     Stool Occurrence 5 x 7 x     Emesis Occurrence 2 x 0 x           Physical Exam   Constitutional: He is oriented to person, place, and time. He appears lethargic. He is cooperative. He is easily aroused. No distress.   Temporal and distal extremity muscle wasting   HENT:   Head: Normocephalic and atraumatic.   Mouth/Throat: No oropharyngeal exudate.   Several broken teeth; poor dentition   Eyes: Scleral icterus is present.   Cardiovascular: Normal rate, regular rhythm, normal heart sounds, intact distal pulses and normal pulses.    Pulmonary/Chest: Effort normal. No respiratory distress. He has decreased breath sounds in the right lower field and the left lower field. He has no rales.   Abdominal: Soft. Bowel sounds are normal. He exhibits distension (ascites). There is no tenderness.   Musculoskeletal: He exhibits edema (2+ BLE).   Neurological: He is oriented to person, place, and time and easily aroused. He appears lethargic.   + asterixis   Skin: Skin is warm, dry and intact. He is not diaphoretic. There is erythema.   BLE crackling, scaly skin    Psychiatric:   + AAOx4, lethargic   Nursing note and vitals reviewed.      Laboratory:  Immunosuppressants     None        CBC:     Recent Labs  Lab 07/07/18 0518   WBC 3.57*   RBC 2.79*   HGB 9.7*   HCT 28.3*   PLT 55*   *   MCH 34.8*   MCHC 34.3     CMP:     Recent Labs  Lab 07/07/18 0518   *   CALCIUM 9.8   ALBUMIN 3.3*   PROT 5.6*      K 4.0   CO2 16*   *   BUN 40*   CREATININE 1.9*   ALKPHOS 80   ALT 42   AST 56*   BILITOT 3.2*     Coagulation:     Recent Labs  Lab 07/07/18 0518   INR 1.6*     Labs within the past 24 hours have been reviewed.    Diagnostic Results:  I have personally reviewed all pertinent imaging studies.Scheduled Meds:   aspirin  81 mg Oral Daily    ergocalciferol  50,000 Units Oral Q7 Days    fluconazole  200 mg Oral Daily    heparin (porcine)  5,000 Units Subcutaneous Q8H    lactulose  45 g Oral TID    levETIRAcetam  500 mg Oral BID    lidocaine  2 patch Transdermal Q24H    lipase-protease-amylase 12,000-38,000-60,000 units  2 capsule Oral TID WM    omega-3 acid ethyl esters  2 g Oral Daily with breakfast    pantoprazole  40 mg Oral BID    promethazine (PHENERGAN) IVPB  12.5 mg Intravenous Once    propranolol  5 mg Oral BID    rifAXImin  550 mg Oral BID    sertraline  50 mg Oral Daily    sodium bicarbonate  1,300 mg Oral TID     Continuous Infusions:  PRN Meds:acetaminophen, diphenhydrAMINE-zinc acetate 1-0.1%, lactulose, lactulose, metoclopramide HCl, ondansetron, sodium chloride 0.9%    Review of Systems   Constitutional: Positive for appetite change (mother reports patient eating better) and fatigue. Negative for activity change, chills and fever.   HENT: Negative for mouth sores, sore throat and trouble swallowing.    Eyes: Negative for visual disturbance.   Respiratory: Negative for shortness of breath (upon walking fast).    Cardiovascular: Positive for leg swelling. Negative for chest pain.   Endocrine: Negative for polydipsia and  polyuria.   Genitourinary: Negative for decreased urine volume and difficulty urinating.   Musculoskeletal: Negative for arthralgias.   Skin: Negative for rash.   Neurological: Positive for weakness. Negative for dizziness, syncope and headaches.   Hematological: Bruises/bleeds easily.   Psychiatric/Behavioral: Negative for agitation, confusion, hallucinations and sleep disturbance.     Objective:     Vital Signs (Most Recent):  Temp: 97.9 °F (36.6 °C) (07/07/18 0730)  Pulse: 62 (07/07/18 0730)  Resp: 16 (07/07/18 0730)  BP: (!) 118/55 (07/07/18 0730)  SpO2: 98 % (07/07/18 0730) Vital Signs (24h Range):  Temp:  [97.6 °F (36.4 °C)-98.1 °F (36.7 °C)] 97.9 °F (36.6 °C)  Pulse:  [57-70] 62  Resp:  [16-18] 16  SpO2:  [97 %-100 %] 98 %  BP: (111-151)/(55-67) 118/55     Weight: 132.8 kg (292 lb 12.3 oz)  Body mass index is 37.59 kg/m².    Intake/Output - Last 3 Shifts       07/05 0700 - 07/06 0659 07/06 0700 - 07/07 0659 07/07 0700 - 07/08 0659    P.O. 1500 1940     Total Intake(mL/kg) 1500 (11.1) 1940 (14.6)     Urine (mL/kg/hr) 450 (0.1) 450 (0.1)     Total Output 450 450      Net +1050 +1490             Urine Occurrence 4 x 5 x     Stool Occurrence 5 x 7 x     Emesis Occurrence 2 x 0 x           Physical Exam   Constitutional: He is oriented to person, place, and time. He appears well-developed and well-nourished.   HENT:   Head: Normocephalic and atraumatic.   Mouth/Throat: No oropharyngeal exudate.   Eyes: EOM are normal. Pupils are equal, round, and reactive to light. Scleral icterus is present.   Neck: No JVD present.   Cardiovascular: Exam reveals no gallop and no friction rub.    Murmur heard.  Pulmonary/Chest: Effort normal and breath sounds normal. No stridor. No respiratory distress. He has no wheezes. He has no rales.   Abdominal: Soft. Bowel sounds are normal. He exhibits distension (mild). He exhibits no mass.   Musculoskeletal: He exhibits edema. He exhibits no tenderness.   Lymphadenopathy:     He has no  cervical adenopathy.   Neurological: He is alert and oriented to person, place, and time.   Skin: Skin is warm and dry. No rash noted. No erythema.   Psychiatric: He has a normal mood and affect. His behavior is normal.       Laboratory:  Immunosuppressants     None        CBC:     Recent Labs  Lab 07/07/18 0518   WBC 3.57*   RBC 2.79*   HGB 9.7*   HCT 28.3*   PLT 55*   *   MCH 34.8*   MCHC 34.3     CMP:     Recent Labs  Lab 07/07/18 0518   *   CALCIUM 9.8   ALBUMIN 3.3*   PROT 5.6*      K 4.0   CO2 16*   *   BUN 40*   CREATININE 1.9*   ALKPHOS 80   ALT 42   AST 56*   BILITOT 3.2*     Coagulation:     Recent Labs  Lab 07/07/18 0518   INR 1.6*     Labs within the past 24 hours have been reviewed.    MELD-Na score: 22 at 7/7/2018  5:18 AM  MELD score: 22 at 7/7/2018  5:18 AM  Calculated from:  Serum Creatinine: 1.9 mg/dL at 7/7/2018  5:18 AM  Serum Sodium: 145 mmol/L (Rounded to 137) at 7/7/2018  5:18 AM  Total Bilirubin: 3.2 mg/dL at 7/7/2018  5:18 AM  INR(ratio): 1.6 at 7/7/2018  5:18 AM  Age: 63 years    Will update MELD 22     Diagnostic Results:  US Liver Transplant Post:  No results found for this or any previous visit.  None    Assessment/Plan:     * Acute kidney injury superimposed on chronic kidney disease    - VICKY initially improving with albumin infusion but with minimal intake 6/25 and 6/26.  - Ur Na: 32, unlikely for HRS. Repeat urine lytes 7/6.  - Bicarb 17, start Na Bicarb 1300 mg TID  - Cr remains elevated 2.1, holding diuretics (previously lasix 40mg daily). Albumin x 1.         Dermatitis associated with moisture              Physical deconditioning    - PT/OT consulted.  - encourage OOBTC. Walking in halls with walker.         Ascites due to alcoholic cirrhosis    - Para 6/25 negative for infection.  - abd mildly distended, will likely need para next week (7/9)        Coagulopathy    - Secondary to decompensated liver disease.        CKD (chronic kidney disease), stage  "III    - see acute kidney injury superimposed on CKD III        Cholestatic pruritus    - cholestyramine has previously been ineffective, given depressed mood; will initiate zoloft for mood and pruritus.        Hypoalbuminemia due to protein-calorie malnutrition    - Dietary consulted for calorie count.  - Pt eating poorly accord to wife.  Does take boost supplement.  - "pre-hab" regimen and supplements ordered.   - Patient drinking at least 2-3 boost supplements, protein shake, and benoprotein daily.   - If continues to have low intake, consider starting Megace.         Pancytopenia    - related to decompensated liver disease        Decompensated hepatic cirrhosis    - Placed status 7 on 6/26 for prescription drug coverage change. Re-activated 7/3, MELD 22. Listing tenuous due to frailty and malnutrition.    - propanolol decreased to 5mg BID, monitor BP closely    MELD-Na score: 22 at 7/6/2018  5:38 AM  MELD score: 22 at 7/6/2018  5:38 AM  Calculated from:  Serum Creatinine: 2.1 mg/dL at 7/6/2018  5:37 AM  Serum Sodium: 140 mmol/L (Rounded to 137) at 7/6/2018  5:37 AM  Total Bilirubin: 2.6 mg/dL at 7/6/2018  5:37 AM  INR(ratio): 1.5 at 7/6/2018  5:38 AM  Age: 63 years        Coronary artery disease involving native coronary artery of native heart without angina pectoris    - Continue home ASA.        Anemia of chronic disease    - H&H stable.  - 1 unit PRBC transfused 6/28.         Thrombocytopenia    - Secondary to splenomegaly from portal HTN- should improve with txp.  - No s/s overt bleed.        Seizures    - Oral Keppra transitioned to IV as pt not able to swallow 6/26.  - Resumed oral Keppra since mentation improved.  - EEG 6/26 negative.        Bilateral edema of lower extremity    - completed vanc x 7 days for BLE cellulitis  - Cr improved today.     - edema improved with gentle diuresis and albumin PRN, holding diuretics for elevated Cr        Hepatic encephalopathy    - Acute on chronic  - improved from " grade 3 to 1-2 with lactulose, xifaxan.  - continue rifaximin 550 mg BID.  - infx workup blood/urine/ascites neg to date.  Has been off cefepime/vanc, d/c'd 7/1.   - adjusting lactulose regimen to prevent acute HE. PRN lactulose ordered in addition to scheduled dosing.   - cannot skip lactulose, easily becomes encephalopathic            VTE Risk Mitigation         Ordered     heparin (porcine) injection 5,000 Units  Every 8 hours      06/22/18 1502     IP VTE HIGH RISK PATIENT  Once      06/22/18 1502          The patients clinical status was discussed at multidisplinary rounds, involving transplant surgery, transplant medicine, pharmacy, nursing, nutrition, and social work    Discharge Planning:  No Patient Care Coordination Note on file.      Waleska Roland MD  Liver Transplant  [facility]

## 2018-07-08 LAB
ALBUMIN SERPL BCP-MCNC: 2.9 G/DL
ALP SERPL-CCNC: 89 U/L
ALT SERPL W/O P-5'-P-CCNC: 39 U/L
ANION GAP SERPL CALC-SCNC: 9 MMOL/L
AST SERPL-CCNC: 49 U/L
BACTERIA UR CULT: NORMAL
BACTERIA UR CULT: NORMAL
BASOPHILS # BLD AUTO: 0.07 K/UL
BASOPHILS NFR BLD: 1.8 %
BILIRUB SERPL-MCNC: 2.6 MG/DL
BUN SERPL-MCNC: 39 MG/DL
CALCIUM SERPL-MCNC: 9.6 MG/DL
CHLORIDE SERPL-SCNC: 118 MMOL/L
CO2 SERPL-SCNC: 18 MMOL/L
CREAT SERPL-MCNC: 1.9 MG/DL
DIFFERENTIAL METHOD: ABNORMAL
EOSINOPHIL # BLD AUTO: 0.5 K/UL
EOSINOPHIL NFR BLD: 11.7 %
ERYTHROCYTE [DISTWIDTH] IN BLOOD BY AUTOMATED COUNT: 19.9 %
EST. GFR  (AFRICAN AMERICAN): 42.7 ML/MIN/1.73 M^2
EST. GFR  (NON AFRICAN AMERICAN): 37 ML/MIN/1.73 M^2
GLUCOSE SERPL-MCNC: 128 MG/DL
HCT VFR BLD AUTO: 25.2 %
HGB BLD-MCNC: 8.7 G/DL
IMM GRANULOCYTES # BLD AUTO: 0.01 K/UL
IMM GRANULOCYTES NFR BLD AUTO: 0.3 %
INR PPP: 1.6
LYMPHOCYTES # BLD AUTO: 1 K/UL
LYMPHOCYTES NFR BLD: 26 %
MAGNESIUM SERPL-MCNC: 2.3 MG/DL
MCH RBC QN AUTO: 34.8 PG
MCHC RBC AUTO-ENTMCNC: 34.5 G/DL
MCV RBC AUTO: 101 FL
MONOCYTES # BLD AUTO: 0.6 K/UL
MONOCYTES NFR BLD: 16.1 %
NEUTROPHILS # BLD AUTO: 1.7 K/UL
NEUTROPHILS NFR BLD: 44.1 %
NRBC BLD-RTO: 0 /100 WBC
PLATELET # BLD AUTO: 57 K/UL
PMV BLD AUTO: 13.4 FL
POTASSIUM SERPL-SCNC: 4 MMOL/L
PROT SERPL-MCNC: 5.1 G/DL
PROTHROMBIN TIME: 15.6 SEC
RBC # BLD AUTO: 2.5 M/UL
SODIUM SERPL-SCNC: 145 MMOL/L
WBC # BLD AUTO: 3.92 K/UL

## 2018-07-08 PROCEDURE — 85025 COMPLETE CBC W/AUTO DIFF WBC: CPT | Mod: NTX

## 2018-07-08 PROCEDURE — 25000003 PHARM REV CODE 250: Mod: NTX | Performed by: NURSE PRACTITIONER

## 2018-07-08 PROCEDURE — 25000003 PHARM REV CODE 250: Mod: NTX | Performed by: INTERNAL MEDICINE

## 2018-07-08 PROCEDURE — 80053 COMPREHEN METABOLIC PANEL: CPT | Mod: NTX

## 2018-07-08 PROCEDURE — 85610 PROTHROMBIN TIME: CPT | Mod: NTX

## 2018-07-08 PROCEDURE — 20600001 HC STEP DOWN PRIVATE ROOM: Mod: NTX

## 2018-07-08 PROCEDURE — 63600175 PHARM REV CODE 636 W HCPCS: Mod: NTX | Performed by: PHYSICIAN ASSISTANT

## 2018-07-08 PROCEDURE — 99233 SBSQ HOSP IP/OBS HIGH 50: CPT | Mod: NTX,,, | Performed by: INTERNAL MEDICINE

## 2018-07-08 PROCEDURE — 36415 COLL VENOUS BLD VENIPUNCTURE: CPT | Mod: NTX

## 2018-07-08 PROCEDURE — 25000003 PHARM REV CODE 250: Mod: NTX | Performed by: PHYSICIAN ASSISTANT

## 2018-07-08 PROCEDURE — 83735 ASSAY OF MAGNESIUM: CPT | Mod: NTX

## 2018-07-08 RX ADMIN — LACTULOSE 45 G: 20 SOLUTION ORAL at 09:07

## 2018-07-08 RX ADMIN — HEPARIN SODIUM 5000 UNITS: 5000 INJECTION, SOLUTION INTRAVENOUS; SUBCUTANEOUS at 09:07

## 2018-07-08 RX ADMIN — SODIUM BICARBONATE 650 MG TABLET 1300 MG: at 02:07

## 2018-07-08 RX ADMIN — RIFAXIMIN 550 MG: 550 TABLET ORAL at 09:07

## 2018-07-08 RX ADMIN — PANCRELIPASE 2 CAPSULE: 60000; 12000; 38000 CAPSULE, DELAYED RELEASE PELLETS ORAL at 09:07

## 2018-07-08 RX ADMIN — ONDANSETRON 8 MG: 8 TABLET, ORALLY DISINTEGRATING ORAL at 05:07

## 2018-07-08 RX ADMIN — OMEGA-3-ACID ETHYL ESTERS 2 G: 1 CAPSULE, LIQUID FILLED ORAL at 09:07

## 2018-07-08 RX ADMIN — HEPARIN SODIUM 5000 UNITS: 5000 INJECTION, SOLUTION INTRAVENOUS; SUBCUTANEOUS at 02:07

## 2018-07-08 RX ADMIN — HEPARIN SODIUM 5000 UNITS: 5000 INJECTION, SOLUTION INTRAVENOUS; SUBCUTANEOUS at 05:07

## 2018-07-08 RX ADMIN — PANTOPRAZOLE SODIUM 40 MG: 40 TABLET, DELAYED RELEASE ORAL at 09:07

## 2018-07-08 RX ADMIN — PANCRELIPASE 2 CAPSULE: 60000; 12000; 38000 CAPSULE, DELAYED RELEASE PELLETS ORAL at 12:07

## 2018-07-08 RX ADMIN — FLUCONAZOLE 200 MG: 200 TABLET ORAL at 09:07

## 2018-07-08 RX ADMIN — LEVETIRACETAM 500 MG: 500 TABLET, FILM COATED ORAL at 09:07

## 2018-07-08 RX ADMIN — SODIUM BICARBONATE 650 MG TABLET 1300 MG: at 09:07

## 2018-07-08 RX ADMIN — LACTULOSE 45 G: 20 SOLUTION ORAL at 02:07

## 2018-07-08 RX ADMIN — LIDOCAINE 2 PATCH: 50 PATCH TOPICAL at 12:07

## 2018-07-08 RX ADMIN — ASPIRIN 81 MG: 81 TABLET, COATED ORAL at 09:07

## 2018-07-08 RX ADMIN — PROPRANOLOL HYDROCHLORIDE 5 MG: 20 SOLUTION ORAL at 09:07

## 2018-07-08 RX ADMIN — PANCRELIPASE 2 CAPSULE: 60000; 12000; 38000 CAPSULE, DELAYED RELEASE PELLETS ORAL at 05:07

## 2018-07-08 RX ADMIN — SERTRALINE HYDROCHLORIDE 50 MG: 50 TABLET ORAL at 09:07

## 2018-07-08 NOTE — ASSESSMENT & PLAN NOTE
- VICKY initially improving with albumin infusion but with minimal intake 6/25 and 6/26.  - Ur Na: 20, likely HRS.   - Bicarb 18, on Na Bicarb 1300 mg TID  - Cr remains stable at 1.9, holding diuretics (previously lasix 40mg daily). Will give IV 25% Albumin, if lower serum albumin.

## 2018-07-08 NOTE — SUBJECTIVE & OBJECTIVE
Scheduled Meds:   aspirin  81 mg Oral Daily    ergocalciferol  50,000 Units Oral Q7 Days    fluconazole  200 mg Oral Daily    heparin (porcine)  5,000 Units Subcutaneous Q8H    lactulose  45 g Oral TID    levETIRAcetam  500 mg Oral BID    lidocaine  2 patch Transdermal Q24H    lipase-protease-amylase 12,000-38,000-60,000 units  2 capsule Oral TID WM    omega-3 acid ethyl esters  2 g Oral Daily with breakfast    pantoprazole  40 mg Oral BID    promethazine (PHENERGAN) IVPB  12.5 mg Intravenous Once    propranolol  5 mg Oral BID    rifAXImin  550 mg Oral BID    sertraline  50 mg Oral Daily    sodium bicarbonate  1,300 mg Oral TID     Continuous Infusions:  PRN Meds:acetaminophen, diphenhydrAMINE-zinc acetate 1-0.1%, lactulose, lactulose, metoclopramide HCl, ondansetron, sodium chloride 0.9%    Review of Systems   Constitutional: Positive for appetite change and fatigue. Negative for activity change, chills, diaphoresis, fever and unexpected weight change.   HENT: Negative for congestion, facial swelling, hearing loss and sore throat.    Eyes: Negative for pain, discharge and visual disturbance.   Respiratory: Negative for cough, chest tightness, shortness of breath and wheezing.    Cardiovascular: Positive for leg swelling. Negative for chest pain and palpitations.   Gastrointestinal: Positive for diarrhea (with lactulose). Negative for abdominal distention, abdominal pain, blood in stool, constipation, nausea and vomiting.   Endocrine: Negative.    Genitourinary: Negative for decreased urine volume, dysuria and frequency.   Musculoskeletal: Negative for arthralgias and gait problem.   Skin: Negative for rash and wound.   Neurological: Positive for tremors. Negative for weakness and headaches.   Hematological: Negative.  Negative for adenopathy. Does not bruise/bleed easily.   Psychiatric/Behavioral: Positive for confusion.   All other systems reviewed and are negative.    Objective:     Vital Signs  (Most Recent):  Temp: 97.7 °F (36.5 °C) (07/08/18 0415)  Pulse: 67 (07/08/18 0415)  Resp: 15 (07/08/18 0415)  BP: (!) 129/58 (07/08/18 0415)  SpO2: 97 % (07/08/18 0415) Vital Signs (24h Range):  Temp:  [97.6 °F (36.4 °C)-97.9 °F (36.6 °C)] 97.7 °F (36.5 °C)  Pulse:  [64-69] 67  Resp:  [15-18] 15  SpO2:  [97 %-100 %] 97 %  BP: (111-129)/(54-59) 129/58     Weight: 132.8 kg (292 lb 12.3 oz)  Body mass index is 37.59 kg/m².    Intake/Output - Last 3 Shifts       07/06 0700 - 07/07 0659 07/07 0700 - 07/08 0659 07/08 0700 - 07/09 0659    P.O. 1940 1900     Total Intake(mL/kg) 1940 (14.6) 1900 (14.3)     Urine (mL/kg/hr) 450 (0.1) 750 (0.2)     Emesis/NG output  0 (0)     Other  0 (0)     Stool  0 (0)     Blood  0 (0)     Total Output 450 750      Net +1490 +1150             Urine Occurrence 5 x 3 x     Stool Occurrence 7 x 3 x     Emesis Occurrence 0 x 0 x           Physical Exam   Constitutional: He is oriented to person, place, and time. He appears alert. He is cooperative. He is easily aroused. No distress.   Temporal and distal extremity muscle wasting   HENT:   Head: Normocephalic and atraumatic.   Mouth/Throat: No oropharyngeal exudate.   Several broken teeth; poor dentition   Eyes: Scleral icterus is present.   Cardiovascular: Normal rate, regular rhythm, normal heart sounds, intact distal pulses and normal pulses.    Pulmonary/Chest: Effort normal. No respiratory distress. He has decreased breath sounds in the right lower field and the left lower field. He has no rales.   Abdominal: Soft. Bowel sounds are normal. He exhibits distension (ascites). There is no tenderness.   Musculoskeletal: He exhibits edema (2+ BLE).   Neurological: He is oriented to person, place, and time and easily aroused. He appears alert.   + asterixis   Skin: Skin is warm, dry and intact. He is not diaphoretic. There is erythema.   BLE crackling, scaly skin   Psychiatric:   + AAOx4,   Nursing note and vitals  reviewed.      Laboratory:  Immunosuppressants     None        CBC:     Recent Labs  Lab 07/08/18  0404   WBC 3.92   RBC 2.50*   HGB 8.7*   HCT 25.2*   PLT 57*   *   MCH 34.8*   MCHC 34.5     CMP:     Recent Labs  Lab 07/08/18  0404   *   CALCIUM 9.6   ALBUMIN 2.9*   PROT 5.1*      K 4.0   CO2 18*   *   BUN 39*   CREATININE 1.9*   ALKPHOS 89   ALT 39   AST 49*   BILITOT 2.6*     Coagulation:     Recent Labs  Lab 07/08/18  0404   INR 1.6*     Labs within the past 24 hours have been reviewed.    Diagnostic Results:  I have personally reviewed all pertinent imaging studies.Scheduled Meds:   aspirin  81 mg Oral Daily    ergocalciferol  50,000 Units Oral Q7 Days    fluconazole  200 mg Oral Daily    heparin (porcine)  5,000 Units Subcutaneous Q8H    lactulose  45 g Oral TID    levETIRAcetam  500 mg Oral BID    lidocaine  2 patch Transdermal Q24H    lipase-protease-amylase 12,000-38,000-60,000 units  2 capsule Oral TID WM    omega-3 acid ethyl esters  2 g Oral Daily with breakfast    pantoprazole  40 mg Oral BID    promethazine (PHENERGAN) IVPB  12.5 mg Intravenous Once    propranolol  5 mg Oral BID    rifAXImin  550 mg Oral BID    sertraline  50 mg Oral Daily    sodium bicarbonate  1,300 mg Oral TID     Continuous Infusions:  PRN Meds:acetaminophen, diphenhydrAMINE-zinc acetate 1-0.1%, lactulose, lactulose, metoclopramide HCl, ondansetron, sodium chloride 0.9%    Review of Systems   Constitutional: Positive for appetite change (mother reports patient eating better) and fatigue. Negative for activity change, chills and fever.   HENT: Negative for mouth sores, sore throat and trouble swallowing.    Eyes: Negative for visual disturbance.   Respiratory: Negative for shortness of breath (upon walking fast).    Cardiovascular: Positive for leg swelling. Negative for chest pain.   Endocrine: Negative for polydipsia and polyuria.   Genitourinary: Negative for decreased urine volume and  difficulty urinating.   Musculoskeletal: Negative for arthralgias.   Skin: Negative for rash.   Neurological: Positive for weakness. Negative for dizziness, syncope and headaches.   Hematological: Bruises/bleeds easily.   Psychiatric/Behavioral: Negative for agitation, confusion, hallucinations and sleep disturbance.     Objective:     Vital Signs (Most Recent):  Temp: 97.7 °F (36.5 °C) (07/08/18 0415)  Pulse: 67 (07/08/18 0415)  Resp: 15 (07/08/18 0415)  BP: (!) 129/58 (07/08/18 0415)  SpO2: 97 % (07/08/18 0415) Vital Signs (24h Range):  Temp:  [97.6 °F (36.4 °C)-97.9 °F (36.6 °C)] 97.7 °F (36.5 °C)  Pulse:  [64-69] 67  Resp:  [15-18] 15  SpO2:  [97 %-100 %] 97 %  BP: (111-129)/(54-59) 129/58     Weight: 132.8 kg (292 lb 12.3 oz)  Body mass index is 37.59 kg/m².    Intake/Output - Last 3 Shifts       07/06 0700 - 07/07 0659 07/07 0700 - 07/08 0659 07/08 0700 - 07/09 0659    P.O. 1940 1900     Total Intake(mL/kg) 1940 (14.6) 1900 (14.3)     Urine (mL/kg/hr) 450 (0.1) 750 (0.2)     Emesis/NG output  0 (0)     Other  0 (0)     Stool  0 (0)     Blood  0 (0)     Total Output 450 750      Net +1490 +1150             Urine Occurrence 5 x 3 x     Stool Occurrence 7 x 3 x     Emesis Occurrence 0 x 0 x           Physical Exam   Constitutional: He is oriented to person, place, and time. He appears well-developed and well-nourished.   HENT:   Head: Normocephalic and atraumatic.   Mouth/Throat: No oropharyngeal exudate.   Eyes: EOM are normal. Pupils are equal, round, and reactive to light. Scleral icterus is present.   Neck: No JVD present.   Cardiovascular: Exam reveals no gallop and no friction rub.    Murmur heard.  Pulmonary/Chest: Effort normal and breath sounds normal. No stridor. No respiratory distress. He has no wheezes. He has no rales.   Abdominal: Soft. Bowel sounds are normal. He exhibits distension (mild). He exhibits no mass.   Musculoskeletal: He exhibits edema. He exhibits no tenderness.   Lymphadenopathy:      He has no cervical adenopathy.   Neurological: He is alert and oriented to person, place, and time.   Has asterixis and baseline tremor    Skin: Skin is warm and dry. No rash noted. No erythema.   Psychiatric: He has a normal mood and affect. His behavior is normal.       Laboratory:  Immunosuppressants     None        CBC:     Recent Labs  Lab 07/08/18  0404   WBC 3.92   RBC 2.50*   HGB 8.7*   HCT 25.2*   PLT 57*   *   MCH 34.8*   MCHC 34.5     CMP:     Recent Labs  Lab 07/08/18 0404   *   CALCIUM 9.6   ALBUMIN 2.9*   PROT 5.1*      K 4.0   CO2 18*   *   BUN 39*   CREATININE 1.9*   ALKPHOS 89   ALT 39   AST 49*   BILITOT 2.6*     Coagulation:     Recent Labs  Lab 07/08/18 0404   INR 1.6*     Labs within the past 24 hours have been reviewed.    MELD-Na score: 21 at 7/8/2018  4:04 AM  MELD score: 21 at 7/8/2018  4:04 AM  Calculated from:  Serum Creatinine: 1.9 mg/dL at 7/8/2018  4:04 AM  Serum Sodium: 145 mmol/L (Rounded to 137) at 7/8/2018  4:04 AM  Total Bilirubin: 2.6 mg/dL at 7/8/2018  4:04 AM  INR(ratio): 1.6 at 7/8/2018  4:04 AM  Age: 63 years    Updated MELD 22 yesterday. Today, MELD is 21.     Diagnostic Results:  US Liver Transplant Post:  No results found for this or any previous visit.  None

## 2018-07-08 NOTE — PROGRESS NOTES
[facility]  Liver Transplant  Progress Note    Patient Name: Alon Adamson  MRN: [id number]  Admission Date: 6/22/2018  Hospital Length of Stay: 16 days  Code Status: Full Code  Primary Care Provider: Mckinley Vides MD    Subjective:     History of Present Illness:  Mr. 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  Complications of liver disease include hyperbilirubinemia, hypoalbuminemia, severe malnutrition,   hepatic encephalopathy, thrombocytopenia, splenomegaly, ascites, hypervolemia, coagulopathy, portal HTN.  Pt recently admitted for severe hepatic encephalopathy requiring intubation but has been stable from mentation standpoint post resolution of acute enceophalopathy. Mr. Adamson was seen in clinic 6/22 and was noted to have significant hypervolemia, weeping from BLE, and VICKY on CKD3 on routine labs.  His MELD increased from 22 to 26 per labs- coordinator notified and MELD updated.  Cr elevated to 2.3 from baseline of 1.4.  Also with worsening ascites.  Pt reported increased pruritis, generalized fatigue and weakness.  He presented in wheelchair.  ROS otherwise negative.      Hospital Course:  Patient is listed for liver transplant, placed on internal hold 6/25 for HE then status 7 on 6/26 due to change in prescription coverage. He was re-activated on the list 7/3, MELD 22. Pt started on IV diuretics 6/22 and continued 6/23 and 6/24.  BLE edema and ascites signif improved with diuresis and kidney function also improved.  Pt noted to have increased asterixis  6/24. Upon admission, he was noted to have grade 1 hepatic encephalopathy which remained until 6/24 when grade 1-2 HE was noted.  Lactulose increased until pt had a BM- he had not had BM during previous day. On 6/25 AM, pt with signif worsened encephalopathy opening eyes to verbal and tactile stimuli only.  Pt given lactulose enemas, infx workup sent, started on IV abx (ceftriaxone, vanc), hydrated with  albumin, diuretics discontinued, CT head obtained and negative. Paracentesis 6/25 negative for peritonitis per cell count and U/A negative. Pt also with concern for BLE cellulitis- upper legs bright red with lower BLE still with dark appearance of venous stasis. Placed on vancomycin with improvement in BLE redness 6/26. Vanc continued for 7 days (ended 7/1). His HE waxed and waned for several days requiring lactulose enemas. Pt eating minimally during period of encephalopathy requiring gentle hydration with IVF 6/26. Of note, taco placement attempted 6/26 but unable secondary to agitation upon insertion prompting self resolving nose bleed. Micro lab called 6/26 PM with blood cx + for G+C in blood- pt continued on vanc which was started 6/25. ID consulted, suspect + blood cultures contaminant. EEG obtained 6/26 as pt with h/o serizures and negative. Pt continued on home Keppra. VICKY on admission initially improved with diuresis, but Cr sakina intermittently during admission likely related to aggressive diuresis. Of note, with chest pain overnight 6/26-6/27 that resolved without intervention and pt reported as mild.  EKG NSR with PVC, troponin 0.1 in setting of hypervolemia + VICKY.  Normal dobutamine stress 1/2018.  Cardiology consulted and felt not ACS, no need for further workup other than continue to treat current risk factors for CAD with ASA (pt already on ASA and statin as outpt) + BB for portal hypertension which was started.     Interval History: no acute events overnight. Grade I HE, AAOx4, mental status improved and stable fpr last two days. Continues to work with PT/OT, walker at bedside, encourage OOB, sat in chair for awhile, walked with walker.  Appetite remains tenuous, drinking protein shakes TID and protein powder. Denies nausea, possibly related to ascites, likely will need para next week. Cr improved to 1.9 from 2.1 and remains stable, holding diuretics, hold albumin today, resume tomorrow if lower.  Increased bicarb supplement two days ago, improved, will monitor. Urine Na <20, consistent with pre-renal or HRS, most likely HRS. Since creatinine improving and BP OK, will hold off on adding midodrine and octreotide.      Scheduled Meds:   aspirin  81 mg Oral Daily    ergocalciferol  50,000 Units Oral Q7 Days    fluconazole  200 mg Oral Daily    heparin (porcine)  5,000 Units Subcutaneous Q8H    lactulose  45 g Oral TID    levETIRAcetam  500 mg Oral BID    lidocaine  2 patch Transdermal Q24H    lipase-protease-amylase 12,000-38,000-60,000 units  2 capsule Oral TID WM    omega-3 acid ethyl esters  2 g Oral Daily with breakfast    pantoprazole  40 mg Oral BID    promethazine (PHENERGAN) IVPB  12.5 mg Intravenous Once    propranolol  5 mg Oral BID    rifAXImin  550 mg Oral BID    sertraline  50 mg Oral Daily    sodium bicarbonate  1,300 mg Oral TID     Continuous Infusions:  PRN Meds:acetaminophen, diphenhydrAMINE-zinc acetate 1-0.1%, lactulose, lactulose, metoclopramide HCl, ondansetron, sodium chloride 0.9%    Review of Systems   Constitutional: Positive for appetite change and fatigue. Negative for activity change, chills, diaphoresis, fever and unexpected weight change.   HENT: Negative for congestion, facial swelling, hearing loss and sore throat.    Eyes: Negative for pain, discharge and visual disturbance.   Respiratory: Negative for cough, chest tightness, shortness of breath and wheezing.    Cardiovascular: Positive for leg swelling. Negative for chest pain and palpitations.   Gastrointestinal: Positive for diarrhea (with lactulose). Negative for abdominal distention, abdominal pain, blood in stool, constipation, nausea and vomiting.   Endocrine: Negative.    Genitourinary: Negative for decreased urine volume, dysuria and frequency.   Musculoskeletal: Negative for arthralgias and gait problem.   Skin: Negative for rash and wound.   Neurological: Positive for tremors. Negative for weakness and  headaches.   Hematological: Negative.  Negative for adenopathy. Does not bruise/bleed easily.   Psychiatric/Behavioral: Positive for confusion.   All other systems reviewed and are negative.    Objective:     Vital Signs (Most Recent):  Temp: 97.7 °F (36.5 °C) (07/08/18 0415)  Pulse: 67 (07/08/18 0415)  Resp: 15 (07/08/18 0415)  BP: (!) 129/58 (07/08/18 0415)  SpO2: 97 % (07/08/18 0415) Vital Signs (24h Range):  Temp:  [97.6 °F (36.4 °C)-97.9 °F (36.6 °C)] 97.7 °F (36.5 °C)  Pulse:  [64-69] 67  Resp:  [15-18] 15  SpO2:  [97 %-100 %] 97 %  BP: (111-129)/(54-59) 129/58     Weight: 132.8 kg (292 lb 12.3 oz)  Body mass index is 37.59 kg/m².    Intake/Output - Last 3 Shifts       07/06 0700 - 07/07 0659 07/07 0700 - 07/08 0659 07/08 0700 - 07/09 0659    P.O. 1940 1900     Total Intake(mL/kg) 1940 (14.6) 1900 (14.3)     Urine (mL/kg/hr) 450 (0.1) 750 (0.2)     Emesis/NG output  0 (0)     Other  0 (0)     Stool  0 (0)     Blood  0 (0)     Total Output 450 750      Net +1490 +1150             Urine Occurrence 5 x 3 x     Stool Occurrence 7 x 3 x     Emesis Occurrence 0 x 0 x           Physical Exam   Constitutional: He is oriented to person, place, and time. He appears alert. He is cooperative. He is easily aroused. No distress.   Temporal and distal extremity muscle wasting   HENT:   Head: Normocephalic and atraumatic.   Mouth/Throat: No oropharyngeal exudate.   Several broken teeth; poor dentition   Eyes: Scleral icterus is present.   Cardiovascular: Normal rate, regular rhythm, normal heart sounds, intact distal pulses and normal pulses.    Pulmonary/Chest: Effort normal. No respiratory distress. He has decreased breath sounds in the right lower field and the left lower field. He has no rales.   Abdominal: Soft. Bowel sounds are normal. He exhibits distension (ascites). There is no tenderness.   Musculoskeletal: He exhibits edema (2+ BLE).   Neurological: He is oriented to person, place, and time and easily aroused. He  appears alert.   + asterixis   Skin: Skin is warm, dry and intact. He is not diaphoretic. There is erythema.   BLE crackling, scaly skin   Psychiatric:   + AAOx4,   Nursing note and vitals reviewed.      Laboratory:  Immunosuppressants     None        CBC:     Recent Labs  Lab 07/08/18  0404   WBC 3.92   RBC 2.50*   HGB 8.7*   HCT 25.2*   PLT 57*   *   MCH 34.8*   MCHC 34.5     CMP:     Recent Labs  Lab 07/08/18  0404   *   CALCIUM 9.6   ALBUMIN 2.9*   PROT 5.1*      K 4.0   CO2 18*   *   BUN 39*   CREATININE 1.9*   ALKPHOS 89   ALT 39   AST 49*   BILITOT 2.6*     Coagulation:     Recent Labs  Lab 07/08/18  0404   INR 1.6*     Labs within the past 24 hours have been reviewed.    Diagnostic Results:  I have personally reviewed all pertinent imaging studies.Scheduled Meds:   aspirin  81 mg Oral Daily    ergocalciferol  50,000 Units Oral Q7 Days    fluconazole  200 mg Oral Daily    heparin (porcine)  5,000 Units Subcutaneous Q8H    lactulose  45 g Oral TID    levETIRAcetam  500 mg Oral BID    lidocaine  2 patch Transdermal Q24H    lipase-protease-amylase 12,000-38,000-60,000 units  2 capsule Oral TID WM    omega-3 acid ethyl esters  2 g Oral Daily with breakfast    pantoprazole  40 mg Oral BID    promethazine (PHENERGAN) IVPB  12.5 mg Intravenous Once    propranolol  5 mg Oral BID    rifAXImin  550 mg Oral BID    sertraline  50 mg Oral Daily    sodium bicarbonate  1,300 mg Oral TID     Continuous Infusions:  PRN Meds:acetaminophen, diphenhydrAMINE-zinc acetate 1-0.1%, lactulose, lactulose, metoclopramide HCl, ondansetron, sodium chloride 0.9%    Review of Systems   Constitutional: Positive for appetite change (mother reports patient eating better) and fatigue. Negative for activity change, chills and fever.   HENT: Negative for mouth sores, sore throat and trouble swallowing.    Eyes: Negative for visual disturbance.   Respiratory: Negative for shortness of breath (upon walking  fast).    Cardiovascular: Positive for leg swelling. Negative for chest pain.   Endocrine: Negative for polydipsia and polyuria.   Genitourinary: Negative for decreased urine volume and difficulty urinating.   Musculoskeletal: Negative for arthralgias.   Skin: Negative for rash.   Neurological: Positive for weakness. Negative for dizziness, syncope and headaches.   Hematological: Bruises/bleeds easily.   Psychiatric/Behavioral: Negative for agitation, confusion, hallucinations and sleep disturbance.     Objective:     Vital Signs (Most Recent):  Temp: 97.7 °F (36.5 °C) (07/08/18 0415)  Pulse: 67 (07/08/18 0415)  Resp: 15 (07/08/18 0415)  BP: (!) 129/58 (07/08/18 0415)  SpO2: 97 % (07/08/18 0415) Vital Signs (24h Range):  Temp:  [97.6 °F (36.4 °C)-97.9 °F (36.6 °C)] 97.7 °F (36.5 °C)  Pulse:  [64-69] 67  Resp:  [15-18] 15  SpO2:  [97 %-100 %] 97 %  BP: (111-129)/(54-59) 129/58     Weight: 132.8 kg (292 lb 12.3 oz)  Body mass index is 37.59 kg/m².    Intake/Output - Last 3 Shifts       07/06 0700 - 07/07 0659 07/07 0700 - 07/08 0659 07/08 0700 - 07/09 0659    P.O. 1940 1900     Total Intake(mL/kg) 1940 (14.6) 1900 (14.3)     Urine (mL/kg/hr) 450 (0.1) 750 (0.2)     Emesis/NG output  0 (0)     Other  0 (0)     Stool  0 (0)     Blood  0 (0)     Total Output 450 750      Net +1490 +1150             Urine Occurrence 5 x 3 x     Stool Occurrence 7 x 3 x     Emesis Occurrence 0 x 0 x           Physical Exam   Constitutional: He is oriented to person, place, and time. He appears well-developed and well-nourished.   HENT:   Head: Normocephalic and atraumatic.   Mouth/Throat: No oropharyngeal exudate.   Eyes: EOM are normal. Pupils are equal, round, and reactive to light. Scleral icterus is present.   Neck: No JVD present.   Cardiovascular: Exam reveals no gallop and no friction rub.    Murmur heard.  Pulmonary/Chest: Effort normal and breath sounds normal. No stridor. No respiratory distress. He has no wheezes. He has no  rales.   Abdominal: Soft. Bowel sounds are normal. He exhibits distension (mild). He exhibits no mass.   Musculoskeletal: He exhibits edema. He exhibits no tenderness.   Lymphadenopathy:     He has no cervical adenopathy.   Neurological: He is alert and oriented to person, place, and time.   Has asterixis and baseline tremor    Skin: Skin is warm and dry. No rash noted. No erythema.   Psychiatric: He has a normal mood and affect. His behavior is normal.       Laboratory:  Immunosuppressants     None        CBC:     Recent Labs  Lab 07/08/18  0404   WBC 3.92   RBC 2.50*   HGB 8.7*   HCT 25.2*   PLT 57*   *   MCH 34.8*   MCHC 34.5     CMP:     Recent Labs  Lab 07/08/18  0404   *   CALCIUM 9.6   ALBUMIN 2.9*   PROT 5.1*      K 4.0   CO2 18*   *   BUN 39*   CREATININE 1.9*   ALKPHOS 89   ALT 39   AST 49*   BILITOT 2.6*     Coagulation:     Recent Labs  Lab 07/08/18  0404   INR 1.6*     Labs within the past 24 hours have been reviewed.    MELD-Na score: 21 at 7/8/2018  4:04 AM  MELD score: 21 at 7/8/2018  4:04 AM  Calculated from:  Serum Creatinine: 1.9 mg/dL at 7/8/2018  4:04 AM  Serum Sodium: 145 mmol/L (Rounded to 137) at 7/8/2018  4:04 AM  Total Bilirubin: 2.6 mg/dL at 7/8/2018  4:04 AM  INR(ratio): 1.6 at 7/8/2018  4:04 AM  Age: 63 years    Updated MELD 22 yesterday. Today, MELD is 21.     Diagnostic Results:  US Liver Transplant Post:  No results found for this or any previous visit.  None    Assessment/Plan:     * Acute kidney injury superimposed on chronic kidney disease    - VICKY initially improving with albumin infusion but with minimal intake 6/25 and 6/26.  - Ur Na: 20, likely HRS.   - Bicarb 18, on Na Bicarb 1300 mg TID  - Cr remains stable at 1.9, holding diuretics (previously lasix 40mg daily). Will give IV 25% Albumin, if lower serum albumin.          Dermatitis associated with moisture              Physical deconditioning    - PT/OT consulted.  - encourage OOBTC. Walking in halls  "with walker.         Ascites due to alcoholic cirrhosis    - Para 6/25 negative for infection.  - abd mildly distended, may need para next week (7/9)        Coagulopathy    - Secondary to decompensated liver disease.        CKD (chronic kidney disease), stage III    - see acute kidney injury superimposed on CKD III        Cholestatic pruritus    - cholestyramine has previously been ineffective, given depressed mood; will initiate zoloft for mood and pruritus.        Hypoalbuminemia due to protein-calorie malnutrition    - Dietary consulted for calorie count.  - Pt eating better accord to mother.  Does take boost supplement.  - "pre-hab" regimen and supplements ordered.   - Patient drinking at least 2-3 boost supplements, protein shake, and beneprotein daily.   - If continues to have low intake, consider starting Megace.         Pancytopenia    - related to decompensated liver disease        Decompensated hepatic cirrhosis    - Placed status 7 on 6/26 for prescription drug coverage change. Re-activated 7/3, MELD 22. Listing tenuous due to frailty and malnutrition.    - propanolol decreased to 5mg BID, monitor BP closely    MELD-Na score: 21 at 7/8/2018  4:04 AM  MELD score: 21 at 7/8/2018  4:04 AM  Calculated from:  Serum Creatinine: 1.9 mg/dL at 7/8/2018  4:04 AM  Serum Sodium: 145 mmol/L (Rounded to 137) at 7/8/2018  4:04 AM  Total Bilirubin: 2.6 mg/dL at 7/8/2018  4:04 AM  INR(ratio): 1.6 at 7/8/2018  4:04 AM  Age: 63 years        Coronary artery disease involving native coronary artery of native heart without angina pectoris    - Continue home ASA.        Anemia of chronic disease    - H&H stable.  - 1 unit PRBC transfused 6/28.         Thrombocytopenia    - Secondary to splenomegaly from portal HTN- should improve with txp.  - No s/s overt bleed.        Seizures    - Oral Keppra transitioned to IV as pt not able to swallow 6/26.  - Resumed oral Keppra since mentation improved.  - EEG 6/26 negative.      "   Bilateral edema of lower extremity    - completed vanc x 7 days for BLE cellulitis  - Cr improved today.     - edema improved with gentle diuresis and albumin PRN, holding diuretics for elevated Cr        Hepatic encephalopathy    - Acute on chronic  - improved from grade 3 to 1-2 with lactulose, xifaxan.  - continue rifaximin 550 mg BID.  - infx workup blood/urine/ascites neg to date.  Has been off cefepime/vanc, d/c'd 7/1.   - adjusting lactulose regimen to prevent acute HE. PRN lactulose ordered in addition to scheduled dosing.   - cannot skip lactulose, easily becomes encephalopathic            VTE Risk Mitigation         Ordered     heparin (porcine) injection 5,000 Units  Every 8 hours      06/22/18 1502     IP VTE HIGH RISK PATIENT  Once      06/22/18 1502          The patients clinical status was discussed at multidisplinary rounds, involving transplant surgery, transplant medicine, pharmacy, nursing, nutrition, and social work    Discharge Planning:  No Patient Care Coordination Note on file.      Waleska Roland MD  Liver Transplant  [facility]

## 2018-07-08 NOTE — ASSESSMENT & PLAN NOTE
- Placed status 7 on 6/26 for prescription drug coverage change. Re-activated 7/3, MELD 22. Listing tenuous due to frailty and malnutrition.    - propanolol decreased to 5mg BID, monitor BP closely    MELD-Na score: 21 at 7/8/2018  4:04 AM  MELD score: 21 at 7/8/2018  4:04 AM  Calculated from:  Serum Creatinine: 1.9 mg/dL at 7/8/2018  4:04 AM  Serum Sodium: 145 mmol/L (Rounded to 137) at 7/8/2018  4:04 AM  Total Bilirubin: 2.6 mg/dL at 7/8/2018  4:04 AM  INR(ratio): 1.6 at 7/8/2018  4:04 AM  Age: 63 years

## 2018-07-08 NOTE — ASSESSMENT & PLAN NOTE
"- Dietary consulted for calorie count.  - Pt eating better accord to mother.  Does take boost supplement.  - "pre-hab" regimen and supplements ordered.   - Patient drinking at least 2-3 boost supplements, protein shake, and beneprotein daily.   - If continues to have low intake, consider starting Megace.   "

## 2018-07-08 NOTE — PLAN OF CARE
Pt AAOx4, VSS, afebrile.  Pt denies n/v or pain.  Pt ambulated to and from bathroom with assistance from son.  Son, mother, and wife remains at pt bedside to assist with pt care.  Bed in lowest locked position, call bell within reach, side rails up x2.  Will continue to monitor.

## 2018-07-09 LAB
ALBUMIN SERPL BCP-MCNC: 3 G/DL
ALP SERPL-CCNC: 86 U/L
ALT SERPL W/O P-5'-P-CCNC: 41 U/L
ANION GAP SERPL CALC-SCNC: 11 MMOL/L
AST SERPL-CCNC: 66 U/L
BASOPHILS # BLD AUTO: 0.06 K/UL
BASOPHILS NFR BLD: 1.5 %
BILIRUB SERPL-MCNC: 2.2 MG/DL
BUN SERPL-MCNC: 40 MG/DL
CALCIUM SERPL-MCNC: 10 MG/DL
CHLORIDE SERPL-SCNC: 122 MMOL/L
CO2 SERPL-SCNC: 16 MMOL/L
CREAT SERPL-MCNC: 2 MG/DL
DIASTOLIC DYSFUNCTION: NO
DIFFERENTIAL METHOD: ABNORMAL
EOSINOPHIL # BLD AUTO: 0.6 K/UL
EOSINOPHIL NFR BLD: 15 %
ERYTHROCYTE [DISTWIDTH] IN BLOOD BY AUTOMATED COUNT: 20 %
EST. GFR  (AFRICAN AMERICAN): 40.2 ML/MIN/1.73 M^2
EST. GFR  (NON AFRICAN AMERICAN): 34.7 ML/MIN/1.73 M^2
ESTIMATED PA SYSTOLIC PRESSURE: 22.36
GLUCOSE SERPL-MCNC: 127 MG/DL
HCT VFR BLD AUTO: 26.6 %
HGB BLD-MCNC: 8.9 G/DL
IMM GRANULOCYTES # BLD AUTO: 0.01 K/UL
IMM GRANULOCYTES NFR BLD AUTO: 0.2 %
INR PPP: 1.6
LYMPHOCYTES # BLD AUTO: 1 K/UL
LYMPHOCYTES NFR BLD: 25.2 %
MAGNESIUM SERPL-MCNC: 2.7 MG/DL
MCH RBC QN AUTO: 34.6 PG
MCHC RBC AUTO-ENTMCNC: 33.5 G/DL
MCV RBC AUTO: 104 FL
MITRAL VALVE MOBILITY: NORMAL
MITRAL VALVE REGURGITATION: NORMAL
MONOCYTES # BLD AUTO: 0.6 K/UL
MONOCYTES NFR BLD: 14.7 %
NEUTROPHILS # BLD AUTO: 1.8 K/UL
NEUTROPHILS NFR BLD: 43.4 %
NRBC BLD-RTO: 0 /100 WBC
PHOSPHATE SERPL-MCNC: 3.4 MG/DL
PLATELET # BLD AUTO: 56 K/UL
PMV BLD AUTO: 13.7 FL
POTASSIUM SERPL-SCNC: 4.7 MMOL/L
PROT SERPL-MCNC: 5.6 G/DL
PROTHROMBIN TIME: 15.8 SEC
RBC # BLD AUTO: 2.57 M/UL
RETIRED EF AND QEF - SEE NOTES: 55 (ref 55–65)
SODIUM SERPL-SCNC: 149 MMOL/L
TRICUSPID VALVE REGURGITATION: NORMAL
WBC # BLD AUTO: 4.08 K/UL

## 2018-07-09 PROCEDURE — 25000003 PHARM REV CODE 250: Mod: NTX | Performed by: PHYSICIAN ASSISTANT

## 2018-07-09 PROCEDURE — 84100 ASSAY OF PHOSPHORUS: CPT | Mod: NTX

## 2018-07-09 PROCEDURE — 20600001 HC STEP DOWN PRIVATE ROOM: Mod: NTX

## 2018-07-09 PROCEDURE — 93306 TTE W/DOPPLER COMPLETE: CPT | Mod: 26,NTX,, | Performed by: INTERNAL MEDICINE

## 2018-07-09 PROCEDURE — 97530 THERAPEUTIC ACTIVITIES: CPT | Mod: NTX

## 2018-07-09 PROCEDURE — 97110 THERAPEUTIC EXERCISES: CPT | Mod: NTX

## 2018-07-09 PROCEDURE — 80053 COMPREHEN METABOLIC PANEL: CPT | Mod: NTX

## 2018-07-09 PROCEDURE — 25000003 PHARM REV CODE 250: Mod: NTX | Performed by: STUDENT IN AN ORGANIZED HEALTH CARE EDUCATION/TRAINING PROGRAM

## 2018-07-09 PROCEDURE — 85610 PROTHROMBIN TIME: CPT | Mod: NTX

## 2018-07-09 PROCEDURE — 25000003 PHARM REV CODE 250: Mod: NTX | Performed by: NURSE PRACTITIONER

## 2018-07-09 PROCEDURE — 36415 COLL VENOUS BLD VENIPUNCTURE: CPT | Mod: NTX

## 2018-07-09 PROCEDURE — 25000003 PHARM REV CODE 250: Mod: NTX | Performed by: INTERNAL MEDICINE

## 2018-07-09 PROCEDURE — 93306 TTE W/DOPPLER COMPLETE: CPT | Mod: NTX

## 2018-07-09 PROCEDURE — 85025 COMPLETE CBC W/AUTO DIFF WBC: CPT | Mod: NTX

## 2018-07-09 PROCEDURE — 83735 ASSAY OF MAGNESIUM: CPT | Mod: NTX

## 2018-07-09 PROCEDURE — 99233 SBSQ HOSP IP/OBS HIGH 50: CPT | Mod: NTX,,, | Performed by: NURSE PRACTITIONER

## 2018-07-09 PROCEDURE — 87040 BLOOD CULTURE FOR BACTERIA: CPT | Mod: 59,NTX

## 2018-07-09 PROCEDURE — 63600175 PHARM REV CODE 636 W HCPCS: Mod: NTX | Performed by: PHYSICIAN ASSISTANT

## 2018-07-09 RX ADMIN — HEPARIN SODIUM 5000 UNITS: 5000 INJECTION, SOLUTION INTRAVENOUS; SUBCUTANEOUS at 05:07

## 2018-07-09 RX ADMIN — SODIUM BICARBONATE 650 MG TABLET 1300 MG: at 08:07

## 2018-07-09 RX ADMIN — SERTRALINE HYDROCHLORIDE 50 MG: 50 TABLET ORAL at 08:07

## 2018-07-09 RX ADMIN — PANCRELIPASE 2 CAPSULE: 60000; 12000; 38000 CAPSULE, DELAYED RELEASE PELLETS ORAL at 11:07

## 2018-07-09 RX ADMIN — LACTULOSE 45 G: 20 SOLUTION ORAL at 08:07

## 2018-07-09 RX ADMIN — PROPRANOLOL HYDROCHLORIDE 5 MG: 20 SOLUTION ORAL at 08:07

## 2018-07-09 RX ADMIN — LACTULOSE 45 G: 20 SOLUTION ORAL at 02:07

## 2018-07-09 RX ADMIN — PANTOPRAZOLE SODIUM 40 MG: 40 TABLET, DELAYED RELEASE ORAL at 08:07

## 2018-07-09 RX ADMIN — SODIUM BICARBONATE: 84 INJECTION, SOLUTION INTRAVENOUS at 08:07

## 2018-07-09 RX ADMIN — PANCRELIPASE 2 CAPSULE: 60000; 12000; 38000 CAPSULE, DELAYED RELEASE PELLETS ORAL at 05:07

## 2018-07-09 RX ADMIN — LEVETIRACETAM 500 MG: 500 TABLET, FILM COATED ORAL at 08:07

## 2018-07-09 RX ADMIN — RIFAXIMIN 550 MG: 550 TABLET ORAL at 08:07

## 2018-07-09 RX ADMIN — LIDOCAINE 2 PATCH: 50 PATCH TOPICAL at 11:07

## 2018-07-09 RX ADMIN — OMEGA-3-ACID ETHYL ESTERS 2 G: 1 CAPSULE, LIQUID FILLED ORAL at 09:07

## 2018-07-09 RX ADMIN — PANCRELIPASE 2 CAPSULE: 60000; 12000; 38000 CAPSULE, DELAYED RELEASE PELLETS ORAL at 08:07

## 2018-07-09 RX ADMIN — HEPARIN SODIUM 5000 UNITS: 5000 INJECTION, SOLUTION INTRAVENOUS; SUBCUTANEOUS at 02:07

## 2018-07-09 RX ADMIN — LACTULOSE 45 G: 10 SOLUTION ORAL at 03:07

## 2018-07-09 RX ADMIN — FLUCONAZOLE 200 MG: 200 TABLET ORAL at 08:07

## 2018-07-09 RX ADMIN — SODIUM BICARBONATE 650 MG TABLET 1300 MG: at 02:07

## 2018-07-09 RX ADMIN — ASPIRIN 81 MG: 81 TABLET, COATED ORAL at 08:07

## 2018-07-09 RX ADMIN — HEPARIN SODIUM 5000 UNITS: 5000 INJECTION, SOLUTION INTRAVENOUS; SUBCUTANEOUS at 08:07

## 2018-07-09 NOTE — PROGRESS NOTES
[facility]  Liver Transplant  Progress Note    Patient Name: Alon Adamson  MRN: [id number]  Admission Date: 6/22/2018  Hospital Length of Stay: 17 days  Code Status: Full Code  Primary Care Provider: Mckinley Vides MD    Subjective:     History of Present Illness:  Mr. Adamson is a 64 yo M with PMH ESLD secondary to ETOH cirrhosis, CKD3, CAD.  Complications of liver disease include hyperbilirubinemia, hypoalbuminemia, severe malnutrition,   hepatic encephalopathy, thrombocytopenia, splenomegaly, ascites, hypervolemia, coagulopathy, portal HTN.  Pt recently admitted for severe hepatic encephalopathy requiring intubation but has been stable from mentation standpoint post resolution of acute enceophalopathy. Mr. Adamson was seen in clinic 6/22 and was noted to have significant hypervolemia, weeping from BLE, and VICKY on CKD3 on routine labs.  His MELD increased from 22 to 26 per labs- coordinator notified and MELD updated.  Cr elevated to 2.3 from baseline of 1.4.  Also with worsening ascites.  Pt reported increased pruritis, generalized fatigue and weakness.  He presented in wheelchair.  ROS otherwise negative.      Hospital Course:  Patient is listed for liver transplant, placed on internal hold 6/25 for HE then status 7 on 6/26 due to change in prescription coverage. He was re-activated on the list 7/3, MELD 22. Pt started on IV diuretics 6/22 and continued 6/23 and 6/24.  BLE edema and ascites signif improved with diuresis and kidney function also improved.  Pt noted to have increased asterixis  6/24. Upon admission, he was noted to have grade 1 hepatic encephalopathy which remained until 6/24 when grade 1-2 HE was noted.  Lactulose increased until pt had a BM- he had not had BM during previous day. On 6/25 AM, pt with signif worsened encephalopathy opening eyes to verbal and tactile stimuli only.  Pt given lactulose enemas, infx workup sent, started on IV abx (ceftriaxone, vanc), hydrated with  albumin, diuretics discontinued, CT head obtained and negative. Paracentesis 6/25 negative for peritonitis per cell count and U/A negative. Pt also with concern for BLE cellulitis- upper legs bright red with lower BLE still with dark appearance of venous stasis. Placed on vancomycin with improvement in BLE redness 6/26. Vanc continued for 7 days (ended 7/1). His HE waxed and waned for several days requiring lactulose enemas. Pt eating minimally during period of encephalopathy requiring gentle hydration with IVF 6/26. Of note, taco placement attempted 6/26 but unable secondary to agitation upon insertion prompting self resolving nose bleed. Micro lab called 6/26 PM with blood cx + for G+C in blood- pt continued on vanc which was started 6/25. ID consulted, suspect + blood cultures contaminant. EEG obtained 6/26 as pt with h/o serizures and negative. Pt continued on home Keppra. VICKY on admission initially improved with diuresis, but Cr sakina intermittently during admission likely related to aggressive diuresis. Of note, with chest pain overnight 6/26-6/27 that resolved without intervention and pt reported as mild.  EKG NSR with PVC, troponin 0.1 in setting of hypervolemia + VICKY.  Normal dobutamine stress 1/2018.  Cardiology consulted and felt not ACS, no need for further workup other than continue to treat current risk factors for CAD with ASA (pt already on ASA and statin as outpt) + BB for portal hypertension which was started.     Interval History: family reports worsening confusion overnight requiring extra lactulose. Remains HE grade II today, weak, + asterixis, confusion improved from overnight with extra lactulose and BM's. Blood and urine cultures sent for infectious work up, remains afebrile. Scheduled para tomorrow to r/o SBP. Remains significantly hypervolemic although Na now 149 suggesting dehydration, nephrology consulted. 2Decho today. Continues to work with PT/OT when stable, sat in chair yesterday for 5  hours. Oral intake improving, tolerating protein shakes. Family reports vomiting with brown emesis, KUB/CXR today unremarkable. Remains listed with MELD 22, listing tenuous based on clinical condition (malnutrition and debility).     Scheduled Meds:   aspirin  81 mg Oral Daily    ergocalciferol  50,000 Units Oral Q7 Days    fluconazole  200 mg Oral Daily    heparin (porcine)  5,000 Units Subcutaneous Q8H    lactulose  45 g Oral TID    levETIRAcetam  500 mg Oral BID    lidocaine  2 patch Transdermal Q24H    lipase-protease-amylase 12,000-38,000-60,000 units  2 capsule Oral TID WM    omega-3 acid ethyl esters  2 g Oral Daily with breakfast    pantoprazole  40 mg Oral BID    propranolol  5 mg Oral BID    rifAXImin  550 mg Oral BID    sertraline  50 mg Oral Daily    sodium bicarbonate  1,300 mg Oral TID     Continuous Infusions:  PRN Meds:acetaminophen, diphenhydrAMINE-zinc acetate 1-0.1%, lactulose, lactulose, metoclopramide HCl, ondansetron, sodium chloride 0.9%    Review of Systems   Constitutional: Positive for appetite change (wife reports, patient does not much) and fatigue. Negative for activity change, chills and fever.   HENT: Negative for mouth sores, sore throat and trouble swallowing.    Eyes: Negative for visual disturbance.   Respiratory: Positive for shortness of breath (upon activity). Negative for wheezing.    Cardiovascular: Positive for leg swelling. Negative for chest pain.   Gastrointestinal: Positive for diarrhea (lactulose), nausea and vomiting. Negative for abdominal distention and abdominal pain.   Endocrine: Negative for polydipsia and polyuria.   Genitourinary: Negative for decreased urine volume and difficulty urinating.   Musculoskeletal: Positive for arthralgias (both shoulders have been hurting for last 2 weeks, right > left, improved with lidocaine patches).   Skin: Negative for rash.   Neurological: Positive for weakness. Negative for dizziness, syncope and headaches.    Hematological: Bruises/bleeds easily.   Psychiatric/Behavioral: Negative for agitation, confusion, hallucinations and sleep disturbance.     Objective:     Vital Signs (Most Recent):  Temp: 98.1 °F (36.7 °C) (07/09/18 1125)  Pulse: 71 (07/09/18 1125)  Resp: 16 (07/09/18 1125)  BP: (!) 142/60 (07/09/18 1125)  SpO2: 98 % (07/09/18 1125) Vital Signs (24h Range):  Temp:  [97.3 °F (36.3 °C)-98.1 °F (36.7 °C)] 98.1 °F (36.7 °C)  Pulse:  [67-76] 71  Resp:  [15-20] 16  SpO2:  [98 %-100 %] 98 %  BP: (114-142)/(53-61) 142/60     Weight: 130.2 kg (287 lb 0.6 oz)  Body mass index is 36.85 kg/m².    Intake/Output - Last 3 Shifts       07/07 0700 - 07/08 0659 07/08 0700 - 07/09 0659 07/09 0700 - 07/10 0659    P.O. 1900      Total Intake(mL/kg) 1900 (14.3)      Urine (mL/kg/hr) 750 (0.2)      Emesis/NG output 0 (0)      Other 0 (0)      Stool 0 (0)      Blood 0 (0)      Total Output 750        Net +1150               Urine Occurrence 3 x 1 x     Stool Occurrence 3 x 2 x 1 x    Emesis Occurrence 0 x 1 x           Physical Exam   Constitutional: He is oriented to person, place, and time. He appears well-developed and well-nourished.   HENT:   Head: Normocephalic and atraumatic.   Mouth/Throat: No oropharyngeal exudate.   Eyes: EOM are normal. Pupils are equal, round, and reactive to light. Scleral icterus is present.   Neck: No JVD present.   Cardiovascular: Exam reveals no gallop and no friction rub.    Murmur heard.  Pulmonary/Chest: Effort normal and breath sounds normal. No stridor. No respiratory distress. He has no wheezes. He has no rales.   Abdominal: Soft. Bowel sounds are normal. He exhibits distension (mild). He exhibits no mass.   Musculoskeletal: He exhibits edema. He exhibits no tenderness.   Lymphadenopathy:     He has no cervical adenopathy.   Neurological: He is alert and oriented to person, place, and time.   Has asterixis and baseline tremor    Skin: Skin is warm and dry. No rash noted. No erythema.   BLE with  chronic venous stasis changes, crusty/flaky skin intact without wounds   Psychiatric: He has a normal mood and affect. His behavior is normal.       Laboratory:  Immunosuppressants     None        CBC:   Recent Labs  Lab 07/09/18 0518   WBC 4.08   RBC 2.57*   HGB 8.9*   HCT 26.6*   PLT 56*   *   MCH 34.6*   MCHC 33.5     CMP:   Recent Labs  Lab 07/09/18 0518   *   CALCIUM 10.0   ALBUMIN 3.0*   PROT 5.6*   *   K 4.7   CO2 16*   *   BUN 40*   CREATININE 2.0*   ALKPHOS 86   ALT 41   AST 66*   BILITOT 2.2*     Coagulation:   Recent Labs  Lab 07/09/18 0518   INR 1.6*     Labs within the past 24 hours have been reviewed.    Diagnostic Results:  I have personally reviewed all pertinent imaging studies.    Assessment/Plan:     * Acute kidney injury superimposed on chronic kidney disease    - VICKY initially improving with albumin infusion but with minimal intake 6/25 and 6/26.  - Ur Na: 20, likely HRS.   - Bicarb 18, on Na Bicarb 1300 mg TID  - Holding diuretics (previously lasix 40mg daily) due to elevated Cr  - nephrology consulted due to hypervolemia and multiple electrolyte abnormalities (acidosis, hypernatremia)        Decompensated hepatic cirrhosis    - Placed status 7 on 6/26 for prescription drug coverage change. Re-activated 7/3, MELD 22. Listing tenuous due to frailty and malnutrition.    - propanolol decreased to 5mg BID, monitor BP closely    MELD-Na score: 21 at 7/9/2018  5:18 AM  MELD score: 21 at 7/9/2018  5:18 AM  Calculated from:  Serum Creatinine: 2 mg/dL at 7/9/2018  5:18 AM  Serum Sodium: 149 mmol/L (Rounded to 137) at 7/9/2018  5:18 AM  Total Bilirubin: 2.2 mg/dL at 7/9/2018  5:18 AM  INR(ratio): 1.6 at 7/9/2018  5:18 AM  Age: 63 years        Hepatic encephalopathy    - Acute on chronic  - improved from grade 3 to 1-2 with lactulose, xifaxan.  - continue rifaximin 550 mg BID.  - infx workup blood/urine/ascites neg to date.  Has been off cefepime/vanc, d/c'd 7/1.   - adjusting  "lactulose regimen to prevent acute HE. PRN lactulose ordered in addition to scheduled dosing.   - cannot skip lactulose, easily becomes encephalopathic  - worsening HE overnight, repeat blood and urine cultures, CXR 7/9 stable, diagnostic para tomorrow        Hypoalbuminemia due to protein-calorie malnutrition    - Dietary consulted for calorie count.  - Pt eating better accord to mother.  Does take boost supplement.  - "pre-hab" regimen and supplements ordered.   - Patient drinking at least 2-3 boost supplements, protein shake, and beneprotein daily.   - If continues to have low intake, consider starting Megace.   - c/o persistent nausea, vomiting overnight with brown emesis, KUB 7/9 with scattered bowel gas.         Dermatitis associated with moisture              Physical deconditioning    - PT/OT consulted.  - encourage OOBTC. Walking in halls with walker.         Ascites due to alcoholic cirrhosis    - Para 6/25 negative for infection.  - abd mildly distended  - diagnostic/therapeutic para tomorrow        Coagulopathy    - Secondary to decompensated liver disease.        CKD (chronic kidney disease), stage III    - see acute kidney injury superimposed on CKD III        Cholestatic pruritus    - cholestyramine has previously been ineffective, given depressed mood; will initiate zoloft for mood and pruritus.        Pancytopenia    - related to decompensated liver disease        Coronary artery disease involving native coronary artery of native heart without angina pectoris    - Continue home ASA.  - 2D Echo 7/9 normal EF (55-60%), trivial tricuspid/mitral regurg.        Anemia of chronic disease    - H&H stable.  - 1 unit PRBC transfused 6/28.         Thrombocytopenia    - Secondary to splenomegaly from portal HTN- should improve with txp.  - No s/s overt bleed.        Seizures    - Oral Keppra transitioned to IV as pt not able to swallow 6/26.  - Resumed oral Keppra since mentation improved.  - EEG 6/26 " negative.        Bilateral edema of lower extremity    - completed vanc x 7 days for BLE cellulitis  - edema improved with gentle diuresis and albumin PRN, holding diuretics for elevated Cr  - 2D Echo 7/9 normal EF (55-60%), trivial tricuspid/mitral regurg.            VTE Risk Mitigation         Ordered     heparin (porcine) injection 5,000 Units  Every 8 hours      06/22/18 1502     IP VTE HIGH RISK PATIENT  Once      06/22/18 1502          The patients clinical status was discussed at multidisplinary rounds, involving transplant surgery, transplant medicine, pharmacy, nursing, nutrition, and social work    Discharge Planning: not stable for discharge at this time. Discharge pending improvement in frailty, malnutrition, hypervolemia, and AMS.     Rissa Regalado, BHARGAVI  Liver Transplant  [facility]

## 2018-07-09 NOTE — PT/OT/SLP PROGRESS
"Physical Therapy Treatment    Patient Name:  Alon Adamson   MRN:  [id number]    Recommendations:     Discharge Recommendations:  nursing facility, skilled   Discharge Equipment Recommendations: walker, rolling   Barriers to discharge: None    Assessment:     Alon Adamson is a 63 y.o. male admitted with a medical diagnosis of Acute kidney injury superimposed on chronic kidney disease.  He presents with the following impairments/functional limitations:  weakness, impaired endurance, impaired sensation, impaired self care skills, impaired functional mobilty, gait instability, impaired balance, decreased upper extremity function, decreased lower extremity function, decreased safety awareness, impaired skin, edema, impaired cardiopulmonary response to activity, impaired coordination, decreased ROM.  Recommendations for PT changed, sec to recent changes in cognition and functional mobility.  Current recommendation for pt to receive skilled PT services in the SNF setting upon discharge.  Noted skin breakdown on the buttock, adjacent to gluteal fold, nurse notified.  Currently requires SBA-CGA with all functional mobility assessed.  Pt will require acute skilled PT services to address these deficits and reach maximum level of function.      Recent Surgery: * No surgery found *      Plan:     During this hospitalization, patient to be seen 4 x/week to address the above listed problems via gait training, therapeutic activities, therapeutic exercises, neuromuscular re-education  · Plan of Care Expires:  07/23/18   Plan of Care Reviewed with: patient, spouse, son, mother    Subjective     Communicated with nursing prior to session.  Patient found in supine upon PT entry to room, agreeable to treatment.      "I want to go to the bathroom."  Pt mumbling and difficult to understand throughout tx session    Chief Complaint: B shoulder pain - from pt,  confusion - from family  Patient comments/goals: To reduce " incontinence  Pain/Comfort:  · Pain Rating 1: 0/10    Patients cultural, spiritual, Uatsdin conflicts given the current situation: no    Objective:     Patient found with: telemetry, peripheral IV     General Precautions: Standard, fall   Orthopedic Precautions:N/A   Braces: N/A     Functional Mobility:  · Bed Mobility:     · Rolling Left:  stand by assistance with bed rails  · Scooting: contact guard assistance with ed on bridging to reduce skin breakdown  · Supine to Sit: stand by assistance, with inc time and 45 deg HOB elevated with use of bed rails  · Sit to Supine: independence  · Transfers:     · Sit to Stand:  stand by assistance with ed on hand placement, use of grab bars and 3-1 HRs  · Bed to Chair: contact guard assistance with  rolling walker  using  Stand Pivot  · Toilet Transfer: stand by assistance with  rolling walker  using  Stand Pivot  · Gait: Pt amb 12', 2xs, with toileting bn trials, CGA, RW, with inc time to perf, verbal cuing   · Balance: CGA: dynamic standing balance with AD      AM-PAC 6 CLICK MOBILITY  Turning over in bed (including adjusting bedclothes, sheets and blankets)?: 3  Sitting down on and standing up from a chair with arms (e.g., wheelchair, bedside commode, etc.): 3  Moving from lying on back to sitting on the side of the bed?: 3  Moving to and from a bed to a chair (including a wheelchair)?: 3  Need to walk in hospital room?: 3  Climbing 3-5 steps with a railing?: 2  Basic Mobility Total Score: 17       Therapeutic Activities and Exercises:   Whiteboard reviewed  Ankle pumps: 20 reps, in sit   LAQs: 20 reps each LE perf individually, in sit  Hip abd/add: 20 reps each LE perf individually, in sit  Hip flex: 20 reps each LE perf individually, in sit  Sit-ups: 20 reps, in reclined sitting posture - with Giana  Trunk rot: with neck rot, min ROM noted  Manual therapy - to pinpoint mm spasms with pts spouse present to assist with manual therapy when PT is not present      Patient  left left sidelying with all lines intact, call button in reach, nursing notified and family present.    GOALS:    Physical Therapy Goals        Problem: Physical Therapy Goal    Goal Priority Disciplines Outcome Goal Variances Interventions   Physical Therapy Goal     PT/OT, PT Ongoing (interventions implemented as appropriate)     Description:  Goals to be met by: 18     Patient will increase functional independence with mobility by performin. Supine to sit with Stand-by Assistance. - GOAL MET   2. Sit to stand with S.  3. Gait x 200 ft with Supervision with RW.  4. Ascend/descend 5 stair with right Handrails with SBA.  5. Lower extremity exercise program x 20 reps, with supervision, in order to increase LE strength and (I) with functional mobility.   6. Pt to perf 6MWT: amb 440', to demonstrate a clinically significant improvement in aerobic capacity.                               Time Tracking:     PT Received On: 18  PT Start Time: 1515     PT Stop Time: 1618  PT Total Time (min): 63 min     Billable Minutes: Therapeutic Activity 40 and Therapeutic Exercise 21    Treatment Type: Treatment  PT/PTA: PT     PTA Visit Number: 0     Danielle Monterroso, PT  2018

## 2018-07-09 NOTE — ASSESSMENT & PLAN NOTE
- completed vanc x 7 days for BLE cellulitis  - edema improved with gentle diuresis and albumin PRN, holding diuretics for elevated Cr  - 2D Echo 7/9 normal EF (55-60%), trivial tricuspid/mitral regurg.

## 2018-07-09 NOTE — ASSESSMENT & PLAN NOTE
- Acute on chronic  - improved from grade 3 to 1-2 with lactulose, xifaxan.  - continue rifaximin 550 mg BID.  - infx workup blood/urine/ascites neg to date.  Has been off cefepime/vanc, d/c'd 7/1.   - adjusting lactulose regimen to prevent acute HE. PRN lactulose ordered in addition to scheduled dosing.   - cannot skip lactulose, easily becomes encephalopathic  - worsening HE overnight, repeat blood and urine cultures, CXR 7/9 stable, diagnostic para tomorrow

## 2018-07-09 NOTE — SUBJECTIVE & OBJECTIVE
Past Medical History:   Diagnosis Date    Anticoagulant long-term use     Arthritis     Back pain     Cellulitis     Cirrhosis     Coronary artery disease     DM (diabetes mellitus)     Hearing loss     right ear    Hepatic encephalopathy     Hypertension     diagnosed today (11/25/2015) and prescribed toprol    Leg edema     Nephrolithiasis     JACK (obstructive sleep apnea)     Seizures     Splenomegaly        Past Surgical History:   Procedure Laterality Date    APPENDECTOMY      Open    BACK SURGERY      CHOLECYSTECTOMY      laprascopic    COLONOSCOPY N/A 1/25/2018    Procedure: COLONOSCOPY;  Surgeon: Lashawn Myles MD;  Location: [address]);  Service: Endoscopy;  Laterality: N/A;    LUMBAR DISCECTOMY      SPLENIC ARTERY EMBOLIZATION  04/08/2016    Dr Taveras    TONSILLECTOMY         Review of patient's allergies indicates:   Allergen Reactions    Nsaids (non-steroidal anti-inflammatory drug)      D/t to liver disease.    Tylenol [acetaminophen]      D/t liver disease.    Penicillins Other (See Comments)     Tolerated pip-tazo in 8/2016 without issue  Since childhood was told not to take it     Current Facility-Administered Medications   Medication Frequency    acetaminophen tablet 650 mg Q8H PRN    aspirin EC tablet 81 mg Daily    diphenhydrAMINE-zinc acetate 1-0.1% cream TID PRN    ergocalciferol capsule 50,000 Units Q7 Days    fluconazole tablet 200 mg Daily    heparin (porcine) injection 5,000 Units Q8H    lactulose 10 gram/15 mL solution (enema) 200 g Q6H PRN    lactulose 20 gram/30 mL solution Soln 45 g TID    lactulose 20 gram/30 mL solution Soln 45 g Q6H PRN    levETIRAcetam tablet 500 mg BID    lidocaine 5 % patch 2 patch Q24H    lipase-protease-amylase 12,000-38,000-60,000 units per capsule 2 capsule TID WM    metoclopramide HCl injection 10 mg Q6H PRN    omega-3 acid ethyl esters capsule 2 g Daily with breakfast    ondansetron disintegrating tablet 8 mg  Q8H PRN    pantoprazole EC tablet 40 mg BID    propranolol 20 mg/5 mL (4 mg/mL) solution 5 mg BID    rifAXIMin tablet 550 mg BID    sertraline tablet 50 mg Daily    sodium bicarbonate tablet 1,300 mg TID    sodium chloride 0.9% flush 3 mL PRN     Family History     None        Social History Main Topics    Smoking status: Never Smoker    Smokeless tobacco: Current User     Types: Chew    Alcohol use No    Drug use: No    Sexual activity: Not on file     Review of Systems   Constitutional: Positive for fatigue. Negative for fever.   Respiratory: Negative for cough, choking, chest tightness and shortness of breath.    Cardiovascular: Positive for leg swelling. Negative for chest pain and palpitations.   Gastrointestinal: Positive for abdominal distention and nausea. Negative for abdominal pain, constipation and vomiting.   Genitourinary: Negative for frequency, hematuria and urgency.   Musculoskeletal: Negative for back pain and joint swelling.   Skin: Negative for pallor and rash.   Neurological: Positive for tremors. Negative for syncope, light-headedness and headaches.     Objective:     Vital Signs (Most Recent):  Temp: 98.1 °F (36.7 °C) (07/09/18 1125)  Pulse: 71 (07/09/18 1125)  Resp: 16 (07/09/18 1125)  BP: (!) 142/60 (07/09/18 1125)  SpO2: 98 % (07/09/18 1125)  O2 Device (Oxygen Therapy): room air (07/09/18 1125) Vital Signs (24h Range):  Temp:  [97.3 °F (36.3 °C)-98.1 °F (36.7 °C)] 98.1 °F (36.7 °C)  Pulse:  [67-76] 71  Resp:  [15-20] 16  SpO2:  [98 %-100 %] 98 %  BP: (114-142)/(53-61) 142/60     Weight: 130.2 kg (287 lb 0.6 oz) (07/09/18 0515)  Body mass index is 36.85 kg/m².  Body surface area is 2.61 meters squared.    I/O last 3 completed shifts:  In: 1000 [P.O.:1000]  Out: -     Physical Exam   Constitutional: He is oriented to person, place, and time.   Patient somnolent but easily awake   Poor dentition    HENT:   Head: Normocephalic and atraumatic.   Eyes: EOM are normal. Pupils are equal,  round, and reactive to light.   Neck: Normal range of motion. Neck supple. No JVD present.   Cardiovascular: Normal rate and regular rhythm.    No murmur heard.  Pulmonary/Chest: Effort normal and breath sounds normal. No respiratory distress. He has no wheezes. He has no rales.   Abdominal: Soft. He exhibits no distension. There is no tenderness.   Active bowel sounds with succussion splash     Flapping tremors bilaterally    Musculoskeletal: Normal range of motion. He exhibits edema.   Neurological: He is alert and oriented to person, place, and time. No cranial nerve deficit.   Skin: Skin is dry. Capillary refill takes less than 2 seconds. There is erythema.   Dry scally erythematous rash in the bilateral LE with 2+ pitting edema    Also pitting edema on bilateral UE    Psychiatric: He has a normal mood and affect. His behavior is normal.       Significant Labs:  CBC:   Recent Labs  Lab 07/09/18  0518   WBC 4.08   RBC 2.57*   HGB 8.9*   HCT 26.6*   PLT 56*   *   MCH 34.6*   MCHC 33.5     CMP:   Recent Labs  Lab 07/09/18 0518   *   CALCIUM 10.0   ALBUMIN 3.0*   PROT 5.6*   *   K 4.7   CO2 16*   *   BUN 40*   CREATININE 2.0*   ALKPHOS 86   ALT 41   AST 66*   BILITOT 2.2*     All labs within the past 24 hours have been reviewed.

## 2018-07-09 NOTE — ASSESSMENT & PLAN NOTE
- Para 6/25 negative for infection.  - abd mildly distended  - diagnostic/therapeutic para tomorrow

## 2018-07-09 NOTE — HPI
Mr. Adamson is 63 yr old male with decompensated alcoholic cirrhosis, CKD III and CAD admitted here on 06/22/18 admitted for bilateral LE hypervolemia and VICKY on CKD III with cr of 2.3 baseline around 1.5. Patient has multiple hospitalization in the past secondary to decompensated liver failure. Patient is currently on transplant team. During current admission patient was getting lasix and albumin intermittently for VICKY however renal function was not getting better. Patient hospital course complicated by hepatic encephalopathy with some improvement of mentation with lactulose. Also treated for cellulitis with 7 days of vancomycin.Nephrology is consulted for worsening/not improving VICKY despite lasix/albumin.     Per chart review patient has no hypotensive episodes, BP mostly above 100's. Has not received nephrotoxins such as NSAIDs/contrast media. No sever sepsis/septic shock or acute blood loss during current admit. UA with 2+ leukocytes and Hyaline casts, no wbc/rbc cast or proteinuria. Urine Na+ < 20.    Patient was transferred to ICU on 7/13/2018 after being more lethargic and hypoactive.  After two days was stepped down back to [facility].

## 2018-07-09 NOTE — PLAN OF CARE
Problem: Patient Care Overview  Goal: Plan of Care Review  Outcome: Ongoing (interventions implemented as appropriate)  Pt's VSS, disoriented to time, and 1 person assist with walker. Pt c/o of shoulder pain-managed with lidocaine patch. 2D echo obtained. Pt's family at bedside. Pt's bed in lowest position, call bell within reach, and nonskid socks on.

## 2018-07-09 NOTE — PLAN OF CARE
Problem: Physical Therapy Goal  Goal: Physical Therapy Goal  Goals to be met by: 18     Patient will increase functional independence with mobility by performin. Supine to sit with Stand-by Assistance. - GOAL MET   2. Sit to stand with S.  3. Gait x 200 ft with Supervision with RW.  4. Ascend/descend 5 stair with right Handrails with SBA.  5. Lower extremity exercise program x 20 reps, with supervision, in order to increase LE strength and (I) with functional mobility.   6. Pt to perf 6MWT: amb 440', to demonstrate a clinically significant improvement in aerobic capacity.             Outcome: Ongoing (interventions implemented as appropriate)  Pt achieved 1 goal.

## 2018-07-09 NOTE — SUBJECTIVE & OBJECTIVE
Scheduled Meds:   aspirin  81 mg Oral Daily    ergocalciferol  50,000 Units Oral Q7 Days    fluconazole  200 mg Oral Daily    heparin (porcine)  5,000 Units Subcutaneous Q8H    lactulose  45 g Oral TID    levETIRAcetam  500 mg Oral BID    lidocaine  2 patch Transdermal Q24H    lipase-protease-amylase 12,000-38,000-60,000 units  2 capsule Oral TID WM    omega-3 acid ethyl esters  2 g Oral Daily with breakfast    pantoprazole  40 mg Oral BID    propranolol  5 mg Oral BID    rifAXImin  550 mg Oral BID    sertraline  50 mg Oral Daily    sodium bicarbonate  1,300 mg Oral TID     Continuous Infusions:  PRN Meds:acetaminophen, diphenhydrAMINE-zinc acetate 1-0.1%, lactulose, lactulose, metoclopramide HCl, ondansetron, sodium chloride 0.9%    Review of Systems   Constitutional: Positive for appetite change (wife reports, patient does not much) and fatigue. Negative for activity change, chills and fever.   HENT: Negative for mouth sores, sore throat and trouble swallowing.    Eyes: Negative for visual disturbance.   Respiratory: Positive for shortness of breath (upon activity). Negative for wheezing.    Cardiovascular: Positive for leg swelling. Negative for chest pain.   Gastrointestinal: Positive for diarrhea (lactulose), nausea and vomiting. Negative for abdominal distention and abdominal pain.   Endocrine: Negative for polydipsia and polyuria.   Genitourinary: Negative for decreased urine volume and difficulty urinating.   Musculoskeletal: Positive for arthralgias (both shoulders have been hurting for last 2 weeks, right > left, improved with lidocaine patches).   Skin: Negative for rash.   Neurological: Positive for weakness. Negative for dizziness, syncope and headaches.   Hematological: Bruises/bleeds easily.   Psychiatric/Behavioral: Negative for agitation, confusion, hallucinations and sleep disturbance.     Objective:     Vital Signs (Most Recent):  Temp: 98.1 °F (36.7 °C) (07/09/18 1125)  Pulse: 71  (07/09/18 1125)  Resp: 16 (07/09/18 1125)  BP: (!) 142/60 (07/09/18 1125)  SpO2: 98 % (07/09/18 1125) Vital Signs (24h Range):  Temp:  [97.3 °F (36.3 °C)-98.1 °F (36.7 °C)] 98.1 °F (36.7 °C)  Pulse:  [67-76] 71  Resp:  [15-20] 16  SpO2:  [98 %-100 %] 98 %  BP: (114-142)/(53-61) 142/60     Weight: 130.2 kg (287 lb 0.6 oz)  Body mass index is 36.85 kg/m².    Intake/Output - Last 3 Shifts       07/07 0700 - 07/08 0659 07/08 0700 - 07/09 0659 07/09 0700 - 07/10 0659    P.O. 1900      Total Intake(mL/kg) 1900 (14.3)      Urine (mL/kg/hr) 750 (0.2)      Emesis/NG output 0 (0)      Other 0 (0)      Stool 0 (0)      Blood 0 (0)      Total Output 750        Net +1150               Urine Occurrence 3 x 1 x     Stool Occurrence 3 x 2 x 1 x    Emesis Occurrence 0 x 1 x           Physical Exam   Constitutional: He is oriented to person, place, and time. He appears well-developed and well-nourished.   HENT:   Head: Normocephalic and atraumatic.   Mouth/Throat: No oropharyngeal exudate.   Eyes: EOM are normal. Pupils are equal, round, and reactive to light. Scleral icterus is present.   Neck: No JVD present.   Cardiovascular: Exam reveals no gallop and no friction rub.    Murmur heard.  Pulmonary/Chest: Effort normal and breath sounds normal. No stridor. No respiratory distress. He has no wheezes. He has no rales.   Abdominal: Soft. Bowel sounds are normal. He exhibits distension (mild). He exhibits no mass.   Musculoskeletal: He exhibits edema. He exhibits no tenderness.   Lymphadenopathy:     He has no cervical adenopathy.   Neurological: He is alert and oriented to person, place, and time.   Has asterixis and baseline tremor    Skin: Skin is warm and dry. No rash noted. No erythema.   BLE with chronic venous stasis changes, crusty/flaky skin intact without wounds   Psychiatric: He has a normal mood and affect. His behavior is normal.       Laboratory:  Immunosuppressants     None        CBC:   Recent Labs  Lab 07/09/18  0518    WBC 4.08   RBC 2.57*   HGB 8.9*   HCT 26.6*   PLT 56*   *   MCH 34.6*   MCHC 33.5     CMP:   Recent Labs  Lab 07/09/18  0518   *   CALCIUM 10.0   ALBUMIN 3.0*   PROT 5.6*   *   K 4.7   CO2 16*   *   BUN 40*   CREATININE 2.0*   ALKPHOS 86   ALT 41   AST 66*   BILITOT 2.2*     Coagulation:   Recent Labs  Lab 07/09/18 0518   INR 1.6*     Labs within the past 24 hours have been reviewed.    Diagnostic Results:  I have personally reviewed all pertinent imaging studies.

## 2018-07-09 NOTE — PLAN OF CARE
Pt AAOx4, VSS, afebrile.  Pt pulled out 20g IV in left AC; inserted 22g in right hand.  Pt c/o n/v, diversionary tactic and therapeutic breathing instructed.  Wife remains at pt bedside and active with pt care.  Pt had increased confusion, PRN lactulose given.  Bed in lowest locked position, call bell within reach, side rails up x2, Will continue to monitor.

## 2018-07-09 NOTE — ASSESSMENT & PLAN NOTE
64 yo male with acute decompensated liver failure secondary to alcohol cirrhosis now with sCr trending up/with out improvement nephrology consulted for VICKY. Etiology most likely pre-renal. Also considered HRS but less likely given optimal BP and renal function labs.     -sCr at baseline 1.4-1.5, currently 2.0  -Renal function with out much improvement despite lasix and albumin   -U & L edema on physical exam today  -Increased stool out put 2/2 lactulose for HE  -No hypotensive episodes/blood loss/septic shock/nephrotoxind during current admit  -UA with hyaline cast, no proteinuria, has some pyuria, urine Na+ < 20, Urine Osmolarity increased (426) - more suggestive of pre-renal etiology  -Evidence of intravascular volume depletion given hypernatremia as well as increased stool out put, previously was also getting daily lasix until d/c recently  -HRS less likely- patient jameson likely volume depleted intravascularly: hypernatremia, low bicarb. Optimal BP.     Recommendations:  -Repeat UA, UPC, Renal US (ordered)  -Will get 2D Echo to assess volume status  -Will spin urine once collected  -Light hydration with D5 + 2 amp HCO3 for 12 hours (ordered)  -Encourage PO intake  -Will consider Albumin after trial of light hydration with bicarb   -will reassess in the morning

## 2018-07-09 NOTE — CONSULTS
[facility]  Nephrology  Consult Note    Patient Name: Alon Adamson  MRN: [id number]  Admission Date: 6/22/2018  Hospital Length of Stay: 17 days  Attending Provider: Elan Guerra MD   Primary Care Physician: Mckinley Vides MD  Principal Problem:Acute kidney injury superimposed on chronic kidney disease    Consults  Subjective:     HPI: Mr. Adamson is 63 yr old male with decompensated alcoholic cirrhosis, CKD III and CAD admitted here on 06/22/18 admitted for bilateral LE hypervolemia and VICKY on CKD III with cr of 2.3 baseline around 1.5. Patient has multiple hospitalization in the past secondary to decompensated liver failure. Patient is currently on transplant team. During current admission patient was getting lasix and albumin intermittently for VICKY however renal function is not getting better. Patient hospital course is complicated by hepatic encephalopathy with some improvement of mentation with lactulose. Also treated for cellulitis with 7 days of vancomycin. Concern for seizures, EEG with out evidence of seizure activities. Patient had paracentesis on current admit but no evidence for SBP, only 1 lt removed. Nephrology is consulted for worsening/not improving VICKY despite lasix/albumin.     Per chart review patient has no hypotensive episodes, BP mostly above 100's. Has not received nephrotoxins such as NSAIDs/contrast media. No sever sepsis/septic shock or acute blood loss during current admit. UA with 2+ leukocytes and Hyaline casts, no wbc/rbc cast or proteinuria. Urine Na+ < 20.    Patient has no complaints when seen today. Family at bedside states his mentation has gotten better today, more lucid. Has increased stool out put as many as 10x daily at times, averages x4 daily. Appetite and enery level is poor but seems to be improving today per wife.       Past Medical History:   Diagnosis Date    Anticoagulant long-term use     Arthritis     Back pain     Cellulitis     Cirrhosis      Coronary artery disease     DM (diabetes mellitus)     Hearing loss     right ear    Hepatic encephalopathy     Hypertension     diagnosed today (11/25/2015) and prescribed toprol    Leg edema     Nephrolithiasis     JACK (obstructive sleep apnea)     Seizures     Splenomegaly        Past Surgical History:   Procedure Laterality Date    APPENDECTOMY      Open    BACK SURGERY      CHOLECYSTECTOMY      laprascopic    COLONOSCOPY N/A 1/25/2018    Procedure: COLONOSCOPY;  Surgeon: Lashawn Myles MD;  Location: [address]);  Service: Endoscopy;  Laterality: N/A;    LUMBAR DISCECTOMY      SPLENIC ARTERY EMBOLIZATION  04/08/2016    Dr Taveras    TONSILLECTOMY         Review of patient's allergies indicates:   Allergen Reactions    Nsaids (non-steroidal anti-inflammatory drug)      D/t to liver disease.    Tylenol [acetaminophen]      D/t liver disease.    Penicillins Other (See Comments)     Tolerated pip-tazo in 8/2016 without issue  Since childhood was told not to take it     Current Facility-Administered Medications   Medication Frequency    acetaminophen tablet 650 mg Q8H PRN    aspirin EC tablet 81 mg Daily    diphenhydrAMINE-zinc acetate 1-0.1% cream TID PRN    ergocalciferol capsule 50,000 Units Q7 Days    fluconazole tablet 200 mg Daily    heparin (porcine) injection 5,000 Units Q8H    lactulose 10 gram/15 mL solution (enema) 200 g Q6H PRN    lactulose 20 gram/30 mL solution Soln 45 g TID    lactulose 20 gram/30 mL solution Soln 45 g Q6H PRN    levETIRAcetam tablet 500 mg BID    lidocaine 5 % patch 2 patch Q24H    lipase-protease-amylase 12,000-38,000-60,000 units per capsule 2 capsule TID WM    metoclopramide HCl injection 10 mg Q6H PRN    omega-3 acid ethyl esters capsule 2 g Daily with breakfast    ondansetron disintegrating tablet 8 mg Q8H PRN    pantoprazole EC tablet 40 mg BID    propranolol 20 mg/5 mL (4 mg/mL) solution 5 mg BID    rifAXIMin tablet 550 mg BID     sertraline tablet 50 mg Daily    sodium bicarbonate tablet 1,300 mg TID    sodium chloride 0.9% flush 3 mL PRN     Family History     None        Social History Main Topics    Smoking status: Never Smoker    Smokeless tobacco: Current User     Types: Chew    Alcohol use No    Drug use: No    Sexual activity: Not on file     Review of Systems   Constitutional: Positive for fatigue. Negative for fever.   Respiratory: Negative for cough, choking, chest tightness and shortness of breath.    Cardiovascular: Positive for leg swelling. Negative for chest pain and palpitations.   Gastrointestinal: Positive for abdominal distention and nausea. Negative for abdominal pain, constipation and vomiting.   Genitourinary: Negative for frequency, hematuria and urgency.   Musculoskeletal: Negative for back pain and joint swelling.   Skin: Negative for pallor and rash.   Neurological: Positive for tremors. Negative for syncope, light-headedness and headaches.     Objective:     Vital Signs (Most Recent):  Temp: 98.1 °F (36.7 °C) (07/09/18 1125)  Pulse: 71 (07/09/18 1125)  Resp: 16 (07/09/18 1125)  BP: (!) 142/60 (07/09/18 1125)  SpO2: 98 % (07/09/18 1125)  O2 Device (Oxygen Therapy): room air (07/09/18 1125) Vital Signs (24h Range):  Temp:  [97.3 °F (36.3 °C)-98.1 °F (36.7 °C)] 98.1 °F (36.7 °C)  Pulse:  [67-76] 71  Resp:  [15-20] 16  SpO2:  [98 %-100 %] 98 %  BP: (114-142)/(53-61) 142/60     Weight: 130.2 kg (287 lb 0.6 oz) (07/09/18 0515)  Body mass index is 36.85 kg/m².  Body surface area is 2.61 meters squared.    I/O last 3 completed shifts:  In: 1000 [P.O.:1000]  Out: -     Physical Exam   Constitutional: He is oriented to person, place, and time.   Patient somnolent but easily awake   Poor dentition    HENT:   Head: Normocephalic and atraumatic.   Eyes: EOM are normal. Pupils are equal, round, and reactive to light.   Neck: Normal range of motion. Neck supple. No JVD present.   Cardiovascular: Normal rate and regular  rhythm.    No murmur heard.  Pulmonary/Chest: Effort normal and breath sounds normal. No respiratory distress. He has no wheezes. He has no rales.   Abdominal: Soft. He exhibits no distension. There is no tenderness.   Active bowel sounds with succussion splash     Flapping tremors bilaterally    Musculoskeletal: Normal range of motion. He exhibits edema.   Neurological: He is alert and oriented to person, place, and time. No cranial nerve deficit.   Skin: Skin is dry. Capillary refill takes less than 2 seconds. There is erythema.   Dry scally erythematous rash in the bilateral LE with 2+ pitting edema    Also pitting edema on bilateral UE    Psychiatric: He has a normal mood and affect. His behavior is normal.       Significant Labs:  CBC:   Recent Labs  Lab 07/09/18 0518   WBC 4.08   RBC 2.57*   HGB 8.9*   HCT 26.6*   PLT 56*   *   MCH 34.6*   MCHC 33.5     CMP:   Recent Labs  Lab 07/09/18 0518   *   CALCIUM 10.0   ALBUMIN 3.0*   PROT 5.6*   *   K 4.7   CO2 16*   *   BUN 40*   CREATININE 2.0*   ALKPHOS 86   ALT 41   AST 66*   BILITOT 2.2*     All labs within the past 24 hours have been reviewed.        Assessment/Plan:     * Acute kidney injury superimposed on chronic kidney disease    62 yo male with acute decompensated liver failure secondary to alcohol cirrhosis now with sCr trending up/with out improvement nephrology consulted for VICKY. Etiology most likely pre-renal. Also considered HRS but less likely given optimal BP and renal function labs.     -sCr at baseline 1.4-1.5, currently 2.0  -Renal function with out much improvement despite lasix and albumin   -U & L edema on physical exam today  -Increased stool out put 2/2 lactulose for HE  -No hypotensive episodes/blood loss/septic shock/nephrotoxind during current admit  -UA with hyaline cast, no proteinuria, has some pyuria, urine Na+ < 20, Urine Osmolarity increased (426) - more suggestive of pre-renal etiology  -Evidence of  intravascular volume depletion given hypernatremia as well as increased stool out put, previously was also getting daily lasix until d/c recently  -HRS less likely- patient jameson likely volume depleted intravascularly: hypernatremia, low bicarb. Optimal BP.     Recommendations:  -Repeat UA, UPC, Renal US (ordered)  -Will get 2D Echo to assess volume status  -Will spin urine once collected  -Light hydration with D5 + 2 amp HCO3 for 12 hours (ordered)  -Encourage PO intake  -Will consider Albumin after trial of light hydration with bicarb   -will reassess in the morning             Thank you for your consult. I will follow-up with patient. Please contact us if you have any additional questions.    Rogelio Nickerson MD  Nephrology  [facility]

## 2018-07-09 NOTE — ASSESSMENT & PLAN NOTE
"- Dietary consulted for calorie count.  - Pt eating better accord to mother.  Does take boost supplement.  - "pre-hab" regimen and supplements ordered.   - Patient drinking at least 2-3 boost supplements, protein shake, and beneprotein daily.   - If continues to have low intake, consider starting Megace.   - c/o persistent nausea, vomiting overnight with brown emesis, KUB 7/9 with scattered bowel gas.   "

## 2018-07-09 NOTE — CONSULTS
Consult acknowledged. Please see full H & P to follow.    Rogelio Nickerson MD  [facility]

## 2018-07-09 NOTE — ASSESSMENT & PLAN NOTE
- Placed status 7 on 6/26 for prescription drug coverage change. Re-activated 7/3, MELD 22. Listing tenuous due to frailty and malnutrition.    - propanolol decreased to 5mg BID, monitor BP closely    MELD-Na score: 21 at 7/9/2018  5:18 AM  MELD score: 21 at 7/9/2018  5:18 AM  Calculated from:  Serum Creatinine: 2 mg/dL at 7/9/2018  5:18 AM  Serum Sodium: 149 mmol/L (Rounded to 137) at 7/9/2018  5:18 AM  Total Bilirubin: 2.2 mg/dL at 7/9/2018  5:18 AM  INR(ratio): 1.6 at 7/9/2018  5:18 AM  Age: 63 years

## 2018-07-10 PROBLEM — E87.0 HYPERNATREMIA: Status: ACTIVE | Noted: 2018-07-10

## 2018-07-10 LAB
ALBUMIN SERPL BCP-MCNC: 2.9 G/DL
ALP SERPL-CCNC: 97 U/L
ALT SERPL W/O P-5'-P-CCNC: 42 U/L
ANION GAP SERPL CALC-SCNC: 6 MMOL/L
ANION GAP SERPL CALC-SCNC: 8 MMOL/L
ANISOCYTOSIS BLD QL SMEAR: SLIGHT
APPEARANCE FLD: CLEAR
AST SERPL-CCNC: 50 U/L
BACTERIA #/AREA URNS AUTO: ABNORMAL /HPF
BASOPHILS # BLD AUTO: 0.06 K/UL
BASOPHILS NFR BLD: 1.7 %
BILIRUB SERPL-MCNC: 2.2 MG/DL
BILIRUB UR QL STRIP: NEGATIVE
BODY FLD TYPE: NORMAL
BUN SERPL-MCNC: 34 MG/DL
BUN SERPL-MCNC: 39 MG/DL
BURR CELLS BLD QL SMEAR: ABNORMAL
CALCIUM SERPL-MCNC: 10.2 MG/DL
CALCIUM SERPL-MCNC: 10.3 MG/DL
CHLORIDE SERPL-SCNC: 124 MMOL/L
CHLORIDE SERPL-SCNC: 126 MMOL/L
CLARITY UR REFRACT.AUTO: ABNORMAL
CO2 SERPL-SCNC: 20 MMOL/L
CO2 SERPL-SCNC: 20 MMOL/L
COLOR FLD: YELLOW
COLOR UR AUTO: ABNORMAL
CREAT SERPL-MCNC: 1.6 MG/DL
CREAT SERPL-MCNC: 1.8 MG/DL
CREAT UR-MCNC: 80 MG/DL
DIFFERENTIAL METHOD: ABNORMAL
EOSINOPHIL # BLD AUTO: 0.4 K/UL
EOSINOPHIL NFR BLD: 9.8 %
ERYTHROCYTE [DISTWIDTH] IN BLOOD BY AUTOMATED COUNT: 19.9 %
EST. GFR  (AFRICAN AMERICAN): 45.6 ML/MIN/1.73 M^2
EST. GFR  (AFRICAN AMERICAN): 52.6 ML/MIN/1.73 M^2
EST. GFR  (NON AFRICAN AMERICAN): 39.5 ML/MIN/1.73 M^2
EST. GFR  (NON AFRICAN AMERICAN): 45.5 ML/MIN/1.73 M^2
GLUCOSE SERPL-MCNC: 147 MG/DL
GLUCOSE SERPL-MCNC: 195 MG/DL
GLUCOSE UR QL STRIP: NEGATIVE
GRAM STN SPEC: NORMAL
HCT VFR BLD AUTO: 25.1 %
HGB BLD-MCNC: 8.4 G/DL
HGB UR QL STRIP: ABNORMAL
HYALINE CASTS UR QL AUTO: 0 /LPF
HYPOCHROMIA BLD QL SMEAR: ABNORMAL
IMM GRANULOCYTES # BLD AUTO: 0.14 K/UL
IMM GRANULOCYTES NFR BLD AUTO: 3.9 %
INR PPP: 1.7
KETONES UR QL STRIP: NEGATIVE
LEUKOCYTE ESTERASE UR QL STRIP: NEGATIVE
LYMPHOCYTES # BLD AUTO: 1.1 K/UL
LYMPHOCYTES NFR BLD: 31.6 %
LYMPHOCYTES NFR FLD MANUAL: 50 %
MAGNESIUM SERPL-MCNC: 2.4 MG/DL
MCH RBC QN AUTO: 33.9 PG
MCHC RBC AUTO-ENTMCNC: 33.5 G/DL
MCV RBC AUTO: 101 FL
MESOTHL CELL NFR FLD MANUAL: 3 %
MICROSCOPIC COMMENT: ABNORMAL
MONOCYTES # BLD AUTO: 0.5 K/UL
MONOCYTES NFR BLD: 14.2 %
MONOS+MACROS NFR FLD MANUAL: 41 %
NEUTROPHILS # BLD AUTO: 1.4 K/UL
NEUTROPHILS NFR BLD: 38.8 %
NEUTROPHILS NFR FLD MANUAL: 6 %
NITRITE UR QL STRIP: NEGATIVE
NRBC BLD-RTO: 0 /100 WBC
OVALOCYTES BLD QL SMEAR: ABNORMAL
PH UR STRIP: 6 [PH] (ref 5–8)
PHOSPHATE SERPL-MCNC: 3.5 MG/DL
PLATELET # BLD AUTO: 44 K/UL
PLATELET BLD QL SMEAR: ABNORMAL
PMV BLD AUTO: 13.6 FL
POIKILOCYTOSIS BLD QL SMEAR: SLIGHT
POLYCHROMASIA BLD QL SMEAR: ABNORMAL
POTASSIUM SERPL-SCNC: 4.1 MMOL/L
POTASSIUM SERPL-SCNC: 4.1 MMOL/L
PROT SERPL-MCNC: 5.2 G/DL
PROT UR QL STRIP: ABNORMAL
PROT UR-MCNC: 92 MG/DL
PROT/CREAT RATIO, UR: 1.15
PROTHROMBIN TIME: 16.5 SEC
RBC # BLD AUTO: 2.48 M/UL
RBC #/AREA URNS AUTO: >100 /HPF (ref 0–4)
SODIUM SERPL-SCNC: 150 MMOL/L
SODIUM SERPL-SCNC: 150 MMOL/L
SODIUM SERPL-SCNC: 154 MMOL/L
SP GR UR STRIP: 1.02 (ref 1–1.03)
TARGETS BLD QL SMEAR: ABNORMAL
URN SPEC COLLECT METH UR: ABNORMAL
UROBILINOGEN UR STRIP-ACNC: NEGATIVE EU/DL
WBC # BLD AUTO: 3.58 K/UL
WBC # FLD: 64 /CU MM
WBC #/AREA URNS AUTO: >100 /HPF (ref 0–5)
WBC CLUMPS UR QL AUTO: ABNORMAL

## 2018-07-10 PROCEDURE — 25000003 PHARM REV CODE 250: Mod: NTX | Performed by: PHYSICIAN ASSISTANT

## 2018-07-10 PROCEDURE — 20600001 HC STEP DOWN PRIVATE ROOM: Mod: NTX

## 2018-07-10 PROCEDURE — 82570 ASSAY OF URINE CREATININE: CPT | Mod: NTX

## 2018-07-10 PROCEDURE — 87102 FUNGUS ISOLATION CULTURE: CPT | Mod: NTX

## 2018-07-10 PROCEDURE — 83735 ASSAY OF MAGNESIUM: CPT | Mod: NTX

## 2018-07-10 PROCEDURE — 85610 PROTHROMBIN TIME: CPT | Mod: NTX

## 2018-07-10 PROCEDURE — 36415 COLL VENOUS BLD VENIPUNCTURE: CPT | Mod: NTX

## 2018-07-10 PROCEDURE — 87075 CULTR BACTERIA EXCEPT BLOOD: CPT | Mod: NTX

## 2018-07-10 PROCEDURE — 89051 BODY FLUID CELL COUNT: CPT | Mod: NTX

## 2018-07-10 PROCEDURE — P9047 ALBUMIN (HUMAN), 25%, 50ML: HCPCS | Mod: JG,NTX | Performed by: TRANSPLANT SURGERY

## 2018-07-10 PROCEDURE — 99233 SBSQ HOSP IP/OBS HIGH 50: CPT | Mod: NTX,,, | Performed by: INTERNAL MEDICINE

## 2018-07-10 PROCEDURE — 80053 COMPREHEN METABOLIC PANEL: CPT | Mod: NTX

## 2018-07-10 PROCEDURE — 80048 BASIC METABOLIC PNL TOTAL CA: CPT | Mod: NTX

## 2018-07-10 PROCEDURE — 25000003 PHARM REV CODE 250: Mod: NTX | Performed by: NURSE PRACTITIONER

## 2018-07-10 PROCEDURE — 63600175 PHARM REV CODE 636 W HCPCS: Mod: JG,NTX | Performed by: TRANSPLANT SURGERY

## 2018-07-10 PROCEDURE — 25000003 PHARM REV CODE 250: Mod: NTX | Performed by: STUDENT IN AN ORGANIZED HEALTH CARE EDUCATION/TRAINING PROGRAM

## 2018-07-10 PROCEDURE — 87070 CULTURE OTHR SPECIMN AEROBIC: CPT | Mod: NTX

## 2018-07-10 PROCEDURE — 63600175 PHARM REV CODE 636 W HCPCS: Mod: NTX | Performed by: PHYSICIAN ASSISTANT

## 2018-07-10 PROCEDURE — 87205 SMEAR GRAM STAIN: CPT | Mod: NTX

## 2018-07-10 PROCEDURE — 84100 ASSAY OF PHOSPHORUS: CPT | Mod: NTX

## 2018-07-10 PROCEDURE — 0W9G30Z DRAINAGE OF PERITONEAL CAVITY WITH DRAINAGE DEVICE, PERCUTANEOUS APPROACH: ICD-10-PCS | Performed by: RADIOLOGY

## 2018-07-10 PROCEDURE — 85025 COMPLETE CBC W/AUTO DIFF WBC: CPT | Mod: NTX

## 2018-07-10 PROCEDURE — 63600175 PHARM REV CODE 636 W HCPCS: Mod: NTX | Performed by: NURSE PRACTITIONER

## 2018-07-10 PROCEDURE — 99233 SBSQ HOSP IP/OBS HIGH 50: CPT | Mod: NTX,,, | Performed by: NURSE PRACTITIONER

## 2018-07-10 PROCEDURE — 84295 ASSAY OF SERUM SODIUM: CPT | Mod: NTX

## 2018-07-10 PROCEDURE — 25000003 PHARM REV CODE 250: Mod: NTX | Performed by: INTERNAL MEDICINE

## 2018-07-10 PROCEDURE — 97530 THERAPEUTIC ACTIVITIES: CPT | Mod: NTX

## 2018-07-10 PROCEDURE — 81001 URINALYSIS AUTO W/SCOPE: CPT | Mod: NTX

## 2018-07-10 RX ORDER — DEXTROSE MONOHYDRATE 50 MG/ML
INJECTION, SOLUTION INTRAVENOUS CONTINUOUS
Status: ACTIVE | OUTPATIENT
Start: 2018-07-10 | End: 2018-07-11

## 2018-07-10 RX ORDER — DEXTROSE MONOHYDRATE 50 MG/ML
INJECTION, SOLUTION INTRAVENOUS CONTINUOUS
Status: DISCONTINUED | OUTPATIENT
Start: 2018-07-10 | End: 2018-07-10

## 2018-07-10 RX ORDER — ALBUMIN HUMAN 250 G/1000ML
25 SOLUTION INTRAVENOUS
Status: DISCONTINUED | OUTPATIENT
Start: 2018-07-10 | End: 2018-07-10

## 2018-07-10 RX ADMIN — LACTULOSE 45 G: 20 SOLUTION ORAL at 01:07

## 2018-07-10 RX ADMIN — PROPRANOLOL HYDROCHLORIDE 5 MG: 20 SOLUTION ORAL at 08:07

## 2018-07-10 RX ADMIN — LACTULOSE 45 G: 20 SOLUTION ORAL at 08:07

## 2018-07-10 RX ADMIN — FLUCONAZOLE 200 MG: 200 TABLET ORAL at 08:07

## 2018-07-10 RX ADMIN — HEPARIN SODIUM 5000 UNITS: 5000 INJECTION, SOLUTION INTRAVENOUS; SUBCUTANEOUS at 01:07

## 2018-07-10 RX ADMIN — LACTULOSE 45 G: 10 SOLUTION ORAL at 05:07

## 2018-07-10 RX ADMIN — ALBUMIN (HUMAN) 12.5 G: 25 SOLUTION INTRAVENOUS at 04:07

## 2018-07-10 RX ADMIN — PANCRELIPASE 2 CAPSULE: 60000; 12000; 38000 CAPSULE, DELAYED RELEASE PELLETS ORAL at 11:07

## 2018-07-10 RX ADMIN — LIDOCAINE 2 PATCH: 50 PATCH TOPICAL at 10:07

## 2018-07-10 RX ADMIN — HEPARIN SODIUM 5000 UNITS: 5000 INJECTION, SOLUTION INTRAVENOUS; SUBCUTANEOUS at 09:07

## 2018-07-10 RX ADMIN — ASPIRIN 81 MG: 81 TABLET, COATED ORAL at 08:07

## 2018-07-10 RX ADMIN — PANTOPRAZOLE SODIUM 40 MG: 40 TABLET, DELAYED RELEASE ORAL at 09:07

## 2018-07-10 RX ADMIN — OMEGA-3-ACID ETHYL ESTERS 2 G: 1 CAPSULE, LIQUID FILLED ORAL at 08:07

## 2018-07-10 RX ADMIN — DEXTROSE: 5 SOLUTION INTRAVENOUS at 09:07

## 2018-07-10 RX ADMIN — PANTOPRAZOLE SODIUM 40 MG: 40 TABLET, DELAYED RELEASE ORAL at 08:07

## 2018-07-10 RX ADMIN — DEXTROSE: 5 SOLUTION INTRAVENOUS at 12:07

## 2018-07-10 RX ADMIN — METOCLOPRAMIDE 10 MG: 5 INJECTION, SOLUTION INTRAMUSCULAR; INTRAVENOUS at 05:07

## 2018-07-10 RX ADMIN — SERTRALINE HYDROCHLORIDE 50 MG: 50 TABLET ORAL at 08:07

## 2018-07-10 RX ADMIN — SODIUM BICARBONATE 650 MG TABLET 1300 MG: at 01:07

## 2018-07-10 RX ADMIN — LEVETIRACETAM 500 MG: 500 TABLET, FILM COATED ORAL at 08:07

## 2018-07-10 RX ADMIN — HEPARIN SODIUM 5000 UNITS: 5000 INJECTION, SOLUTION INTRAVENOUS; SUBCUTANEOUS at 06:07

## 2018-07-10 RX ADMIN — LEVETIRACETAM 500 MG: 500 TABLET, FILM COATED ORAL at 09:07

## 2018-07-10 RX ADMIN — PROPRANOLOL HYDROCHLORIDE 5 MG: 20 SOLUTION ORAL at 09:07

## 2018-07-10 RX ADMIN — ALBUMIN (HUMAN) 25 G: 25 SOLUTION INTRAVENOUS at 04:07

## 2018-07-10 RX ADMIN — SODIUM BICARBONATE 650 MG TABLET 1300 MG: at 08:07

## 2018-07-10 RX ADMIN — PANCRELIPASE 2 CAPSULE: 60000; 12000; 38000 CAPSULE, DELAYED RELEASE PELLETS ORAL at 08:07

## 2018-07-10 RX ADMIN — RIFAXIMIN 550 MG: 550 TABLET ORAL at 08:07

## 2018-07-10 RX ADMIN — RIFAXIMIN 550 MG: 550 TABLET ORAL at 09:07

## 2018-07-10 RX ADMIN — SODIUM BICARBONATE 650 MG TABLET 1300 MG: at 09:07

## 2018-07-10 NOTE — ASSESSMENT & PLAN NOTE
- Acute on chronic  - improved from grade 3 to 1-2 with lactulose, xifaxan.  - continue rifaximin 550 mg BID.  - infx workup blood/urine/ascites neg to date.  Has been off cefepime/vanc, d/c'd 7/1.   - adjusting lactulose regimen to prevent acute HE. PRN lactulose ordered in addition to scheduled dosing.   - cannot skip lactulose, easily becomes encephalopathic  - worsening HE 7/9, blood and urine cultures NGTD, CXR clear, diagnostic para 7/10

## 2018-07-10 NOTE — ASSESSMENT & PLAN NOTE
"- Dietary consulted for calorie count.  - Pt eating better accord to mother.  Does take boost supplement.  - "pre-hab" regimen and supplements ordered. Dc creon due to excessive diarrhea  - Patient drinking at least 2-3 boost supplements and beneprotein daily.   - If continues to have low intake, consider starting Megace.   - c/o persistent nausea, vomiting overnight with brown emesis, KUB 7/9 with scattered bowel gas.   "

## 2018-07-10 NOTE — PROGRESS NOTES
Paracentesis complete. 4800 mLs peritoneal fluid drained. Pt tolerated well. Dressing to clean, dry, and intact. Albumin 25% given 150 mLs. Specimens sent per lab order. Report called to primary nurse.

## 2018-07-10 NOTE — ASSESSMENT & PLAN NOTE
- VICKY initially improving with albumin infusion but with minimal intake 6/25 and 6/26.  - Ur Na: 20, likely HRS.   - Bicarb 18, on Na Bicarb 1300 mg TID  - Holding diuretics (previously lasix 40mg daily) due to elevated Cr  - nephrology consulted due to hypervolemia and multiple electrolyte abnormalities (acidosis, hypernatremia)

## 2018-07-10 NOTE — ASSESSMENT & PLAN NOTE
- completed vanc x 7 days for BLE cellulitis  - edema improved with gentle diuresis and albumin PRN, holding diuretics for elevated Cr/dehydration  - 2D Echo 7/9 normal EF (55-60%), trivial tricuspid/mitral regurg.

## 2018-07-10 NOTE — H&P
Inpatient Radiology Pre-procedure Note    History of Present Illness:  Alon Adamson is a 63 y.o. male with decompensated alcoholic cirrhosis who presents for routine paracentesis for recurrent ascites.     Admission H&P reviewed.  Past Medical History:   Diagnosis Date    Anticoagulant long-term use     Arthritis     Back pain     Cellulitis     Cirrhosis     Coronary artery disease     DM (diabetes mellitus)     Hearing loss     right ear    Hepatic encephalopathy     Hypertension     diagnosed today (11/25/2015) and prescribed toprol    Leg edema     Nephrolithiasis     JACK (obstructive sleep apnea)     Seizures     Splenomegaly      Past Surgical History:   Procedure Laterality Date    APPENDECTOMY      Open    BACK SURGERY      CHOLECYSTECTOMY      laprascopic    COLONOSCOPY N/A 1/25/2018    Procedure: COLONOSCOPY;  Surgeon: Lashawn Myles MD;  Location: [facility] ([address] [place] [address]);  Service: Endoscopy;  Laterality: N/A;    LUMBAR DISCECTOMY      SPLENIC ARTERY EMBOLIZATION  04/08/2016    Dr Taveras    TONSILLECTOMY         Review of Systems:   As documented in primary team H&P    Home Meds:   Prior to Admission medications    Medication Sig Start Date End Date Taking? Authorizing Provider   ascorbic acid (VITAMIN C) 1000 MG tablet Take 1,000 mg by mouth once daily.    Historical Provider, MD   aspirin (ECOTRIN) 81 MG EC tablet Take 1 tablet (81 mg total) by mouth once daily. 1/27/18 1/27/19  Abdi Lowry MD   atorvastatin (LIPITOR) 40 MG tablet TAKE 1 TABLET(40 MG) BY MOUTH EVERY DAY 12/31/17   Elan Marin MD   docusate sodium (COLACE) 100 MG capsule Take 100 mg by mouth once daily.     Historical Provider, MD   ferrous gluconate 270 mg (27 mg iron) Tab Take 1 tablet by mouth once daily.    Historical Provider, MD   fish oil-omega-3 fatty acids 300-1,000 mg capsule Take 2 capsules (2 g total) by mouth once daily.  Patient taking differently: Take 2 g by mouth 2 (two) times daily.   11/25/15   Deya Cohen MD   furosemide (LASIX) 80 MG tablet Take 1 tablet (80 mg total) by mouth 2 (two) times daily. 3/23/18   Miriam García MD   GENERLAC 10 gram/15 mL solution Take 60 mLs (40 g total) by mouth 3 (three) times daily. 60mg three times per day  Patient taking differently: Take 60 mLs by mouth 3 (three) times daily.  12/15/17   Miriam García MD   levETIRAcetam (KEPPRA) 500 MG Tab Take 1 tablet (500 mg total) by mouth 2 (two) times daily. 12/15/17   Miriam García MD   magnesium oxide (MAG-OX) 400 mg tablet Take 1 tablet (400 mg total) by mouth once daily. 9/16/16   Miriam García MD   ondansetron (ZOFRAN) 4 MG tablet Take 1 tablet (4 mg total) by mouth every 8 (eight) hours as needed for Nausea. 12/15/17   Miriam García MD   pantoprazole (PROTONIX) 40 MG tablet Take 1 tablet (40 mg total) by mouth 2 (two) times daily before meals. 1/27/18 1/27/19  Abdi Lowry MD   rifAXIMin (XIFAXAN) 550 mg Tab Take 1 tablet (550 mg total) by mouth 2 (two) times daily. 12/15/17   Miriam García MD   sodium bicarbonate 650 MG tablet Take 1 tablet (650 mg total) by mouth 2 (two) times daily. 6/16/18 6/16/19  Regulo Gómez MD   spironolactone (ALDACTONE) 100 MG tablet Take 2 tablets (200 mg total) by mouth 2 (two) times daily. 3/23/18   Miriam García MD   tramadol (ULTRAM) 50 mg tablet Take 50 mg by mouth every 6 (six) hours as needed for Pain.    Historical MD Deepak     Scheduled Meds:    ergocalciferol  50,000 Units Oral Q7 Days    fluconazole  200 mg Oral Daily    heparin (porcine)  5,000 Units Subcutaneous Q8H    lactulose  45 g Oral TID    levETIRAcetam  500 mg Oral BID    lidocaine  2 patch Transdermal Q24H    omega-3 acid ethyl esters  2 g Oral Daily with breakfast    pantoprazole  40 mg Oral BID    propranolol  5 mg Oral BID    rifAXImin  550 mg Oral BID    sertraline  50 mg Oral Daily    sodium bicarbonate  1,300 mg Oral TID     Continuous Infusions:    dextrose 5 % 100 mL/hr at 07/10/18 0683      PRN Meds:acetaminophen, diphenhydrAMINE-zinc acetate 1-0.1%, lactulose, lactulose, metoclopramide HCl, ondansetron, sodium chloride 0.9%  Anticoagulants/Antiplatelets: no anticoagulation    Allergies:   Review of patient's allergies indicates:   Allergen Reactions    Nsaids (non-steroidal anti-inflammatory drug)      D/t to liver disease.    Tylenol [acetaminophen]      D/t liver disease.    Penicillins Other (See Comments)     Tolerated pip-tazo in 8/2016 without issue  Since childhood was told not to take it     Sedation Hx: have not been any systemic reactions    Labs:    Recent Labs  Lab 07/10/18  0629   INR 1.7*       Recent Labs  Lab 07/10/18  0629   WBC 3.58*   HGB 8.4*   HCT 25.1*   *   PLT 44*      Recent Labs  Lab 07/10/18  0628 07/10/18  1208   *  --    * 150*   K 4.1  --    *  --    CO2 20*  --    BUN 39*  --    CREATININE 1.6*  --    CALCIUM 10.2  --    MG 2.4  --    ALT 42  --    AST 50*  --    ALBUMIN 2.9*  --    BILITOT 2.2*  --          Vitals:  Temp: 97.4 °F (36.3 °C) (07/10/18 1223)  Pulse: 62 (07/10/18 1223)  Resp: 16 (07/10/18 1223)  BP: (!) 149/63 (07/10/18 1223)  SpO2: 99 % (07/10/18 1223)     Physical Exam:    General: no acute distress  Mental Status: alert and oriented to person, place and time  HEENT: normocephalic, atraumatic  Chest: unlabored breathing  Heart: regular heart rate  Abdomen: nondistended  Extremity: moves all extremities    Plan: paracentesis  Sedation Plan: none, local    Solange Huff MD  IR Resident

## 2018-07-10 NOTE — PLAN OF CARE
Problem: Patient Care Overview  Goal: Plan of Care Review  Outcome: Ongoing (interventions implemented as appropriate)  Pt AAOx4, VSS, in bed with upper siderails raised x2, bed in lowest/locked position, call light/personal belongings within reach. Pt instructed to call for assistance. Pt verbalizes understanding. Pt afebrile at this time.  Proper hand hygiene performed before and after pt care.      -Current MELD 26  -Paracentesis scheduled for 7/10 to r/o SBP  -US, 2D echo, CXR, & abd XR done 7/19 -- see results in chart  -Urine + Blood cx done 7/9  -Urine specimen to be collected 7/10 AM  -Na Bicarb gtt @ 75cc/hr  -Low sodium diet followed  -Nightly lactulose given with 2 bm so far this shift  -500cc tea-colored UOP so far this shift, nephrology aware  -1 person assist to BSCC  -Gluteal redness noted, applying clear barrier cream BID + PRN  -Turning pt q2h  -PT/OT working with pt     Will continue to monitor pt

## 2018-07-10 NOTE — ASSESSMENT & PLAN NOTE
64 yo male with acute decompensated liver failure secondary to alcohol cirrhosis now with sCr trending up/with out improvement nephrology consulted for VICKY. Etiology most likely pre-renal. Also considered HRS but less likely given optimal BP and renal function labs.     -sCr at baseline 1.4-1.5   -Renal function with out much improvement despite lasix and albumin   -U & L edema on physical exam today  -Increased stool out put 2/2 lactulose for HE  -No hypotensive episodes/blood loss/septic shock/nephrotoxind during current admit  -UA with hyaline cast, no proteinuria, has some pyuria, urine Na+ < 20, Urine Osmolarity increased (426) - more suggestive of pre-renal etiology  -Evidence of intravascular volume depletion given hypernatremia as well as increased stool out put, previously was also getting daily lasix until d/c recently  -HRS less likely- patient jameson likely volume depleted intravascularly: hypernatremia, low bicarb. Optimal BP.     Recommendations:  -Repeat UA, UPC,  -Renal US with corticomedullary thickening and left non obstructing stone  -Will get 2D Echo - normal EF/no diastolic dysfunction, IVC with > 50 collapse on sniff suggestive of hypovolemia  -Will spin urine once collected  -Cr improved to 1.6 from 2.0 with Light hydration with (D5 + 2 amp HCO3 for 12 hours) yesterday  -Hypernatremic with free water deficit over 5 lt, will start D5 @ 100 cc/hr and check BMP in the evening. (ordered)  -Encourage PO intake  -will reassess in the PM

## 2018-07-10 NOTE — ASSESSMENT & PLAN NOTE
- Placed status 7 on 6/26 for prescription drug coverage change. Re-activated 7/3, MELD 22. Listing tenuous due to frailty and malnutrition.    - propanolol decreased to 5mg BID, monitor BP closely    MELD-Na score: 20 at 7/10/2018 12:08 PM  MELD score: 20 at 7/10/2018  6:29 AM  Calculated from:  Serum Creatinine: 1.6 mg/dL at 7/10/2018  6:28 AM  Serum Sodium: 150 mmol/L (Rounded to 137) at 7/10/2018 12:08 PM  Total Bilirubin: 2.2 mg/dL at 7/10/2018  6:28 AM  INR(ratio): 1.7 at 7/10/2018  6:29 AM  Age: 63 years

## 2018-07-10 NOTE — PLAN OF CARE
Problem: Occupational Therapy Goal  Goal: Occupational Therapy Goal  Goals to be met by: 7/13/18     Patient will increase functional independence with ADLs by performing:    Grooming while standing at sink with Stand-by Assistance. - MET 7/2  Toileting from toilet with Stand-by Assistance for hygiene and clothing management.  Toilet transfer to toilet with Stand-by Assistance.MET 7/2  Upper extremity exercise program x15 reps per handout, with assistance as needed.  Pt will engage in functional mobility to simulate household distances in order to maximize functional activity tolerance required for engagement in occupations of choice with SBA           Outcome: Ongoing (interventions implemented as appropriate)  Continue POC       Abbey Barnard, OTR/L  [phone]  7/10/2018

## 2018-07-10 NOTE — PROGRESS NOTES
"SW Update:    SW presented to pt's room for follow up and continuity of care. Pt presented as alert and oriented sitting in a wheelchair next to his bed. Pt was able to communicate with SW in the beginning of visit but ended up falling asleep sitting in the chair. Pt was accompanied by his wife Vin, mother Dary, and 15 y/o son London who all presented as alert and oriented.  Pt's wife reports she has a room at the [facility] and she rotates being here locally with pt and going back home to work for a few days.  Pt's wife reports pt is never alone between the three of them. Pt's son mentioned he has been here with pt for the past few weeks. SW confirmed with family that his caregiver plan has not changed. Pt's wife reports she will serve as the primary caregiver post transplant and pt's mother, BAKARI, DWIGHT, and son will assist as backup caregivers.  Pt's wife is aware pt will need a 24/7 caregiver while staying at the  Apartments post transplant and understands 15 y/o son cannot stay with pt alone in apartments.  SW assessed coping and self-care with pt's family. Pt's wife, mother, and son all report coping adequately. SW asked how they have been sleeping and where. Wife states she is able to get some rest during the nights on and off and sleeps in the . Mother states she too sleeps in the  and sometimes in pt's room and sleeps well. Pt's son states she has been sleeping in the lobby/waiting area and was woken up by security early in the morning. Son then said he told the  "he would kill him if he didn't have a gun on him"; SW addressed this with son and explained that is not appropriate. SW informed son he needs to sleep either in pt's room or in the  with his mother and is no longer permitted to sleep in lobby. Son verbalized understanding. SW inquired how caregivers appetites have been and if they have been eating adequate meals; wife states they have been eating well on a daily basis. " SW also inquired if family has been able to shower and take care of themselves from a hygiene standpoint. Wife states they all shower at the [facility]. SW informed family that if they are in need of towels etc to shower in pt's room they needs to ask the nurses stations. SW expressed importance of self care and ensuring they remain healthy mentally and physically. Family all verbalized their understanding. SW remains available for education, resources, support, and discharge planning as appropriate.

## 2018-07-10 NOTE — SUBJECTIVE & OBJECTIVE
Interval History: Improving renal function after bicarb drip. Cr 1.6 from 2.0. Hypernatremia 154 suggestive of on going intravascular depletion. Will start D5 today and check labs in the evening. Still making urine UOP 1200 cc/24hrs     Review of patient's allergies indicates:   Allergen Reactions    Nsaids (non-steroidal anti-inflammatory drug)      D/t to liver disease.    Tylenol [acetaminophen]      D/t liver disease.    Penicillins Other (See Comments)     Tolerated pip-tazo in 8/2016 without issue  Since childhood was told not to take it     Current Facility-Administered Medications   Medication Frequency    acetaminophen tablet 650 mg Q8H PRN    dextrose 5 % infusion Continuous    diphenhydrAMINE-zinc acetate 1-0.1% cream TID PRN    ergocalciferol capsule 50,000 Units Q7 Days    fluconazole tablet 200 mg Daily    heparin (porcine) injection 5,000 Units Q8H    lactulose 10 gram/15 mL solution (enema) 200 g Q6H PRN    lactulose 20 gram/30 mL solution Soln 45 g TID    lactulose 20 gram/30 mL solution Soln 45 g Q6H PRN    levETIRAcetam tablet 500 mg BID    lidocaine 5 % patch 2 patch Q24H    metoclopramide HCl injection 10 mg Q6H PRN    omega-3 acid ethyl esters capsule 2 g Daily with breakfast    ondansetron disintegrating tablet 8 mg Q8H PRN    pantoprazole EC tablet 40 mg BID    propranolol 20 mg/5 mL (4 mg/mL) solution 5 mg BID    rifAXIMin tablet 550 mg BID    sertraline tablet 50 mg Daily    sodium bicarbonate tablet 1,300 mg TID    sodium chloride 0.9% flush 3 mL PRN       Objective:     Vital Signs (Most Recent):  Temp: 97.4 °F (36.3 °C) (07/10/18 1223)  Pulse: 62 (07/10/18 1223)  Resp: 16 (07/10/18 1223)  BP: (!) 149/63 (07/10/18 1223)  SpO2: 99 % (07/10/18 1223)  O2 Device (Oxygen Therapy): room air (07/10/18 1223) Vital Signs (24h Range):  Temp:  [96.2 °F (35.7 °C)-98.5 °F (36.9 °C)] 97.4 °F (36.3 °C)  Pulse:  [62-78] 62  Resp:  [16-20] 16  SpO2:  [97 %-100 %] 99 %  BP:  ()/(49-63) 149/63     Weight: 129.3 kg (285 lb 0.9 oz) (07/10/18 0345)  Body mass index is 36.6 kg/m².  Body surface area is 2.6 meters squared.    I/O last 3 completed shifts:  In: 2557.5 [P.O.:1850; I.V.:707.5]  Out: 1200 [Urine:1200]    Physical Exam   Constitutional: He is oriented to person, place, and time.   Patient up in chair, eating breakfast this morning   Poor dentition    HENT:   Head: Normocephalic and atraumatic.   Eyes: EOM are normal. Pupils are equal, round, and reactive to light.   Neck: Normal range of motion. Neck supple. No JVD present.   Cardiovascular: Normal rate and regular rhythm.    No murmur heard.  Pulmonary/Chest: Effort normal and breath sounds normal. No respiratory distress. He has no wheezes. He has no rales.   Abdominal: Soft. He exhibits no distension. There is no tenderness.   Active bowel sounds with succussion splash     Flapping tremors bilaterally    Musculoskeletal: Normal range of motion. He exhibits edema.   Neurological: He is alert and oriented to person, place, and time. No cranial nerve deficit.   Skin: Skin is dry. Capillary refill takes less than 2 seconds. There is erythema.   Dry scally erythematous rash in the bilateral LE with 2+ pitting edema    Also pitting edema on bilateral UE    Psychiatric: He has a normal mood and affect. His behavior is normal.       Significant Labs:  CBC:   Recent Labs  Lab 07/10/18  0629   WBC 3.58*   RBC 2.48*   HGB 8.4*   HCT 25.1*   PLT 44*   *   MCH 33.9*   MCHC 33.5     CMP:   Recent Labs  Lab 07/10/18  0628   *   CALCIUM 10.2   ALBUMIN 2.9*   PROT 5.2*   *   K 4.1   CO2 20*   *   BUN 39*   CREATININE 1.6*   ALKPHOS 97   ALT 42   AST 50*   BILITOT 2.2*     All labs within the past 24 hours have been reviewed.

## 2018-07-10 NOTE — ASSESSMENT & PLAN NOTE
- worsening hypernatremia likely related to dehydration from GI loss with lactulose  - nephrology consulted  - Na worsened this AM, encouraging patient to drink water, d5w infusion started per neph, monitor fluid status very closely as patient easily becomes hypervolemic

## 2018-07-10 NOTE — PROGRESS NOTES
[facility]  Liver Transplant  Progress Note    Patient Name: Alon Adamson  MRN: [id number]  Admission Date: 6/22/2018  Hospital Length of Stay: 18 days  Code Status: Full Code  Primary Care Provider: Mckinley Vides MD    Subjective:     History of Present Illness:  Mr. 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  Complications of liver disease include hyperbilirubinemia, hypoalbuminemia, severe malnutrition,   hepatic encephalopathy, thrombocytopenia, splenomegaly, ascites, hypervolemia, coagulopathy, portal HTN.  Pt recently admitted for severe hepatic encephalopathy requiring intubation but has been stable from mentation standpoint post resolution of acute enceophalopathy. Mr. Adamson was seen in clinic 6/22 and was noted to have significant hypervolemia, weeping from BLE, and VICKY on CKD3 on routine labs.  His MELD increased from 22 to 26 per labs- coordinator notified and MELD updated.  Cr elevated to 2.3 from baseline of 1.4.  Also with worsening ascites.  Pt reported increased pruritis, generalized fatigue and weakness.  He presented in wheelchair.  ROS otherwise negative.      Hospital Course:  Patient is listed for liver transplant, placed on internal hold 6/25 for HE then status 7 on 6/26 due to change in prescription coverage. He was re-activated on the list 7/3, MELD 22. Pt started on IV diuretics 6/22 and continued 6/23 and 6/24.  BLE edema and ascites signif improved with diuresis and kidney function also improved.  Pt noted to have increased asterixis  6/24. Upon admission, he was noted to have grade 1 hepatic encephalopathy which remained until 6/24 when grade 1-2 HE was noted.  Lactulose increased until pt had a BM- he had not had BM during previous day. On 6/25 AM, pt with signif worsened encephalopathy opening eyes to verbal and tactile stimuli only.  Pt given lactulose enemas, infx workup sent, started on IV abx (ceftriaxone, vanc), hydrated with  albumin, diuretics discontinued, CT head obtained and negative. Paracentesis 6/25 negative for peritonitis per cell count and U/A negative. Pt also with concern for BLE cellulitis- upper legs bright red with lower BLE still with dark appearance of venous stasis. Placed on vancomycin with improvement in BLE redness 6/26. Vanc continued for 7 days (ended 7/1). His HE waxed and waned for several days requiring lactulose enemas. Pt eating minimally during period of encephalopathy requiring gentle hydration with IVF 6/26. Of note, taco placement attempted 6/26 but unable secondary to agitation upon insertion prompting self resolving nose bleed. Micro lab called 6/26 PM with blood cx + for G+C in blood- pt continued on vanc which was started 6/25. ID consulted, suspect + blood cultures contaminant. EEG obtained 6/26 as pt with h/o serizures and negative. Pt continued on home Keppra. VICKY on admission initially improved with diuresis, but Cr sakina intermittently during admission likely related to aggressive diuresis. Of note, with chest pain overnight 6/26-6/27 that resolved without intervention and pt reported as mild.  EKG NSR with PVC, troponin 0.1 in setting of hypervolemia + VICKY.  Normal dobutamine stress 1/2018.  Cardiology consulted and felt not ACS, no need for further workup other than continue to treat current risk factors for CAD with ASA (pt already on ASA and statin as outpt) + BB for portal hypertension which was started.     Interval History: no acute events overnight. Remains grade II HE, weak, delayed responses, + asterixis, could be exaggerated due to hypernatremia and excessive protein intake. Encouraged family to decrease protein additives until hypernatremia resolved. Continue lactulose as very brittle HE, Dc creon as this could be worsening diarrhea. Continue novasource supplement, other pre-hab protocol, oral intake improving. Continues to work with PT/OT, weak unable to walk much, sitting in chair for  several hours each day. Remains listed with MELD 22, listing tenuous based on clinical condition (malnutrition and debility). , increasing free water intake, nephrology following. Cultures drawn 7/9 NGTD. Diagnostic para today.     Scheduled Meds:   ergocalciferol  50,000 Units Oral Q7 Days    fluconazole  200 mg Oral Daily    heparin (porcine)  5,000 Units Subcutaneous Q8H    lactulose  45 g Oral TID    levETIRAcetam  500 mg Oral BID    lidocaine  2 patch Transdermal Q24H    omega-3 acid ethyl esters  2 g Oral Daily with breakfast    pantoprazole  40 mg Oral BID    propranolol  5 mg Oral BID    rifAXImin  550 mg Oral BID    sertraline  50 mg Oral Daily    sodium bicarbonate  1,300 mg Oral TID     Continuous Infusions:   dextrose 5 % 100 mL/hr at 07/10/18 1219     PRN Meds:acetaminophen, diphenhydrAMINE-zinc acetate 1-0.1%, lactulose, lactulose, metoclopramide HCl, ondansetron, sodium chloride 0.9%    Review of Systems   Constitutional: Positive for appetite change (wife reports, patient does not much) and fatigue. Negative for activity change, chills and fever.   HENT: Negative for mouth sores, sore throat and trouble swallowing.    Eyes: Negative for visual disturbance.   Respiratory: Positive for shortness of breath (upon activity). Negative for wheezing.    Cardiovascular: Positive for leg swelling. Negative for chest pain.   Gastrointestinal: Positive for diarrhea (lactulose), nausea and vomiting. Negative for abdominal distention and abdominal pain.   Endocrine: Negative for polydipsia and polyuria.   Genitourinary: Negative for decreased urine volume and difficulty urinating.   Musculoskeletal: Positive for arthralgias (both shoulders have been hurting for last 2 weeks, right > left, improved with lidocaine patches).   Skin: Negative for rash.   Neurological: Positive for weakness. Negative for dizziness, syncope and headaches.   Hematological: Bruises/bleeds easily.    Psychiatric/Behavioral: Negative for agitation, confusion, hallucinations and sleep disturbance.     Objective:     Vital Signs (Most Recent):  Temp: 97.4 °F (36.3 °C) (07/10/18 1223)  Pulse: 62 (07/10/18 1223)  Resp: 16 (07/10/18 1223)  BP: (!) 149/63 (07/10/18 1223)  SpO2: 99 % (07/10/18 1223) Vital Signs (24h Range):  Temp:  [96.2 °F (35.7 °C)-98.5 °F (36.9 °C)] 97.4 °F (36.3 °C)  Pulse:  [62-78] 62  Resp:  [16-20] 16  SpO2:  [97 %-100 %] 99 %  BP: ()/(49-63) 149/63     Weight: 129.3 kg (285 lb 0.9 oz)  Body mass index is 36.6 kg/m².    Intake/Output - Last 3 Shifts       07/08 0700 - 07/09 0659 07/09 0700 - 07/10 0659 07/10 0700 - 07/11 0659    P.O.  1850     I.V. (mL/kg)  707.5 (5.5) 168.3 (1.3)    Total Intake(mL/kg)  2557.5 (19.8) 168.3 (1.3)    Urine (mL/kg/hr)  1200 (0.4) 0 (0)    Emesis/NG output   0 (0)    Other   0 (0)    Stool   0 (0)    Blood   0 (0)    Total Output   1200 0    Net   +1357.5 +168.3           Urine Occurrence 1 x 1 x 1 x    Stool Occurrence 2 x 8 x 2 x    Emesis Occurrence 1 x  0 x          Physical Exam   Constitutional: He is oriented to person, place, and time. He appears well-developed and well-nourished.   HENT:   Head: Normocephalic and atraumatic.   Mouth/Throat: No oropharyngeal exudate.   Eyes: EOM are normal. Pupils are equal, round, and reactive to light. Scleral icterus is present.   Neck: No JVD present.   Cardiovascular: Exam reveals no gallop and no friction rub.    Murmur heard.  Pulmonary/Chest: Effort normal and breath sounds normal. No stridor. No respiratory distress. He has no wheezes. He has no rales.   Abdominal: Soft. Bowel sounds are normal. He exhibits distension (mild). He exhibits no mass.   Musculoskeletal: He exhibits edema (+2/+3 generalized edema). He exhibits no tenderness.   Lymphadenopathy:     He has no cervical adenopathy.   Neurological: He is alert and oriented to person, place, and time.   Has asterixis and baseline tremor    Skin: Skin is  warm and dry. No rash noted. No erythema.   BLE with chronic venous stasis changes, crusty/flaky skin intact without wounds   Psychiatric: He has a normal mood and affect. His behavior is normal.       Laboratory:  Immunosuppressants     None        CBC:   Recent Labs  Lab 07/10/18  0629   WBC 3.58*   RBC 2.48*   HGB 8.4*   HCT 25.1*   PLT 44*   *   MCH 33.9*   MCHC 33.5     CMP:   Recent Labs  Lab 07/10/18  0628 07/10/18  1208   *  --    CALCIUM 10.2  --    ALBUMIN 2.9*  --    PROT 5.2*  --    * 150*   K 4.1  --    CO2 20*  --    *  --    BUN 39*  --    CREATININE 1.6*  --    ALKPHOS 97  --    ALT 42  --    AST 50*  --    BILITOT 2.2*  --      Coagulation:   Recent Labs  Lab 07/10/18  0629   INR 1.7*     Labs within the past 24 hours have been reviewed.    Diagnostic Results:  I have personally reviewed all pertinent imaging studies.    Assessment/Plan:     * Acute kidney injury superimposed on chronic kidney disease    - VICKY initially improving with albumin infusion but with minimal intake 6/25 and 6/26.  - Ur Na: 20, likely HRS.   - Bicarb 18, on Na Bicarb 1300 mg TID  - Holding diuretics (previously lasix 40mg daily) due to elevated Cr  - nephrology consulted due to hypervolemia and multiple electrolyte abnormalities (acidosis, hypernatremia)        Hypernatremia    - worsening hypernatremia likely related to dehydration from GI loss with lactulose  - nephrology consulted  - Na worsened this AM, encouraging patient to drink water, d5w infusion started per neph, monitor fluid status very closely as patient easily becomes hypervolemic         Decompensated hepatic cirrhosis    - Placed status 7 on 6/26 for prescription drug coverage change. Re-activated 7/3, MELD 22. Listing tenuous due to frailty and malnutrition.    - propanolol decreased to 5mg BID, monitor BP closely    MELD-Na score: 20 at 7/10/2018 12:08 PM  MELD score: 20 at 7/10/2018  6:29 AM  Calculated from:  Serum Creatinine:  "1.6 mg/dL at 7/10/2018  6:28 AM  Serum Sodium: 150 mmol/L (Rounded to 137) at 7/10/2018 12:08 PM  Total Bilirubin: 2.2 mg/dL at 7/10/2018  6:28 AM  INR(ratio): 1.7 at 7/10/2018  6:29 AM  Age: 63 years        Hepatic encephalopathy    - Acute on chronic  - improved from grade 3 to 1-2 with lactulose, xifaxan.  - continue rifaximin 550 mg BID.  - infx workup blood/urine/ascites neg to date.  Has been off cefepime/vanc, d/c'd 7/1.   - adjusting lactulose regimen to prevent acute HE. PRN lactulose ordered in addition to scheduled dosing.   - cannot skip lactulose, easily becomes encephalopathic  - worsening HE 7/9, blood and urine cultures NGTD, CXR clear, diagnostic para 7/10        Hypoalbuminemia due to protein-calorie malnutrition    - Dietary consulted for calorie count.  - Pt eating better accord to mother.  Does take boost supplement.  - "pre-hab" regimen and supplements ordered. Dc creon due to excessive diarrhea  - Patient drinking at least 2-3 boost supplements and beneprotein daily.   - If continues to have low intake, consider starting Megace.   - c/o persistent nausea, vomiting overnight with brown emesis, KUB 7/9 with scattered bowel gas.         Dermatitis associated with moisture              Physical deconditioning    - PT/OT consulted.  - encourage OOBTC. Walking in halls with walker.         Ascites due to alcoholic cirrhosis    - Para 6/25 negative for infection.  - abd mildly distended  - diagnostic para today        Coagulopathy    - Secondary to decompensated liver disease.        CKD (chronic kidney disease), stage III    - see acute kidney injury superimposed on CKD III        Cholestatic pruritus    - cholestyramine has previously been ineffective, given depressed mood; will initiate zoloft for mood and pruritus.        Pancytopenia    - related to decompensated liver disease        Coronary artery disease involving native coronary artery of native heart without angina pectoris    - Continue " home ASA.  - 2D Echo 7/9 normal EF (55-60%), trivial tricuspid/mitral regurg.        Anemia of chronic disease    - H&H stable.  - 1 unit PRBC transfused 6/28.         Thrombocytopenia    - Secondary to splenomegaly from portal HTN- should improve with txp.  - No s/s overt bleed.        Seizures    - Oral Keppra transitioned to IV as pt not able to swallow 6/26.  - Resumed oral Keppra since mentation improved.  - EEG 6/26 negative.        Bilateral edema of lower extremity    - completed vanc x 7 days for BLE cellulitis  - edema improved with gentle diuresis and albumin PRN, holding diuretics for elevated Cr/dehydration  - 2D Echo 7/9 normal EF (55-60%), trivial tricuspid/mitral regurg.            VTE Risk Mitigation         Ordered     heparin (porcine) injection 5,000 Units  Every 8 hours      06/22/18 1502     IP VTE HIGH RISK PATIENT  Once      06/22/18 1502          The patients clinical status was discussed at multidisplinary rounds, involving transplant surgery, transplant medicine, pharmacy, nursing, nutrition, and social work    Discharge Planning: not stable for discharge at this time. Remains tenuous on transplant list due to frailty, malnutrition, and intermittent AMS.    Rissa Regalado, BHARGAVI  Liver Transplant  [facility]

## 2018-07-10 NOTE — SUBJECTIVE & OBJECTIVE
Scheduled Meds:   ergocalciferol  50,000 Units Oral Q7 Days    fluconazole  200 mg Oral Daily    heparin (porcine)  5,000 Units Subcutaneous Q8H    lactulose  45 g Oral TID    levETIRAcetam  500 mg Oral BID    lidocaine  2 patch Transdermal Q24H    omega-3 acid ethyl esters  2 g Oral Daily with breakfast    pantoprazole  40 mg Oral BID    propranolol  5 mg Oral BID    rifAXImin  550 mg Oral BID    sertraline  50 mg Oral Daily    sodium bicarbonate  1,300 mg Oral TID     Continuous Infusions:   dextrose 5 % 100 mL/hr at 07/10/18 1219     PRN Meds:acetaminophen, diphenhydrAMINE-zinc acetate 1-0.1%, lactulose, lactulose, metoclopramide HCl, ondansetron, sodium chloride 0.9%    Review of Systems   Constitutional: Positive for appetite change (wife reports, patient does not much) and fatigue. Negative for activity change, chills and fever.   HENT: Negative for mouth sores, sore throat and trouble swallowing.    Eyes: Negative for visual disturbance.   Respiratory: Positive for shortness of breath (upon activity). Negative for wheezing.    Cardiovascular: Positive for leg swelling. Negative for chest pain.   Gastrointestinal: Positive for diarrhea (lactulose), nausea and vomiting. Negative for abdominal distention and abdominal pain.   Endocrine: Negative for polydipsia and polyuria.   Genitourinary: Negative for decreased urine volume and difficulty urinating.   Musculoskeletal: Positive for arthralgias (both shoulders have been hurting for last 2 weeks, right > left, improved with lidocaine patches).   Skin: Negative for rash.   Neurological: Positive for weakness. Negative for dizziness, syncope and headaches.   Hematological: Bruises/bleeds easily.   Psychiatric/Behavioral: Negative for agitation, confusion, hallucinations and sleep disturbance.     Objective:     Vital Signs (Most Recent):  Temp: 97.4 °F (36.3 °C) (07/10/18 1223)  Pulse: 62 (07/10/18 1223)  Resp: 16 (07/10/18 1223)  BP: (!) 149/63  (07/10/18 1223)  SpO2: 99 % (07/10/18 1223) Vital Signs (24h Range):  Temp:  [96.2 °F (35.7 °C)-98.5 °F (36.9 °C)] 97.4 °F (36.3 °C)  Pulse:  [62-78] 62  Resp:  [16-20] 16  SpO2:  [97 %-100 %] 99 %  BP: ()/(49-63) 149/63     Weight: 129.3 kg (285 lb 0.9 oz)  Body mass index is 36.6 kg/m².    Intake/Output - Last 3 Shifts       07/08 0700 - 07/09 0659 07/09 0700 - 07/10 0659 07/10 0700 - 07/11 0659    P.O.  1850     I.V. (mL/kg)  707.5 (5.5) 168.3 (1.3)    Total Intake(mL/kg)  2557.5 (19.8) 168.3 (1.3)    Urine (mL/kg/hr)  1200 (0.4) 0 (0)    Emesis/NG output   0 (0)    Other   0 (0)    Stool   0 (0)    Blood   0 (0)    Total Output   1200 0    Net   +1357.5 +168.3           Urine Occurrence 1 x 1 x 1 x    Stool Occurrence 2 x 8 x 2 x    Emesis Occurrence 1 x  0 x          Physical Exam   Constitutional: He is oriented to person, place, and time. He appears well-developed and well-nourished.   HENT:   Head: Normocephalic and atraumatic.   Mouth/Throat: No oropharyngeal exudate.   Eyes: EOM are normal. Pupils are equal, round, and reactive to light. Scleral icterus is present.   Neck: No JVD present.   Cardiovascular: Exam reveals no gallop and no friction rub.    Murmur heard.  Pulmonary/Chest: Effort normal and breath sounds normal. No stridor. No respiratory distress. He has no wheezes. He has no rales.   Abdominal: Soft. Bowel sounds are normal. He exhibits distension (mild). He exhibits no mass.   Musculoskeletal: He exhibits edema (+2/+3 generalized edema). He exhibits no tenderness.   Lymphadenopathy:     He has no cervical adenopathy.   Neurological: He is alert and oriented to person, place, and time.   Has asterixis and baseline tremor    Skin: Skin is warm and dry. No rash noted. No erythema.   BLE with chronic venous stasis changes, crusty/flaky skin intact without wounds   Psychiatric: He has a normal mood and affect. His behavior is normal.       Laboratory:  Immunosuppressants     None        CBC:    Recent Labs  Lab 07/10/18  0629   WBC 3.58*   RBC 2.48*   HGB 8.4*   HCT 25.1*   PLT 44*   *   MCH 33.9*   MCHC 33.5     CMP:   Recent Labs  Lab 07/10/18  0628 07/10/18  1208   *  --    CALCIUM 10.2  --    ALBUMIN 2.9*  --    PROT 5.2*  --    * 150*   K 4.1  --    CO2 20*  --    *  --    BUN 39*  --    CREATININE 1.6*  --    ALKPHOS 97  --    ALT 42  --    AST 50*  --    BILITOT 2.2*  --      Coagulation:   Recent Labs  Lab 07/10/18  0629   INR 1.7*     Labs within the past 24 hours have been reviewed.    Diagnostic Results:  I have personally reviewed all pertinent imaging studies.

## 2018-07-10 NOTE — PLAN OF CARE
Problem: Patient Care Overview  Goal: Plan of Care Review  Outcome: Ongoing (interventions implemented as appropriate)  Pt AAO x 1 throughout shift. Pt disoriented to time, situation and place. Pt given scheduled lactulose throughout shift. Wife and son at bedside throughout shift. Pt worked with PT this AM requiring max cuing. Pt free from falls and injury throughout shift. Para completed in IR this afternoon.

## 2018-07-10 NOTE — PROGRESS NOTES
[facility]  Nephrology  Progress Note    Patient Name: Alon Adamson  MRN: [id number]  Admission Date: 6/22/2018  Hospital Length of Stay: 18 days  Attending Provider: Elan Guerra MD   Primary Care Physician: Mckinley Vides MD  Principal Problem:Acute kidney injury superimposed on chronic kidney disease    Subjective:     HPI: Mr. Adamson is 63 yr old male with decompensated alcoholic cirrhosis, CKD III and CAD admitted here on 06/22/18 admitted for bilateral LE hypervolemia and VICKY on CKD III with cr of 2.3 baseline around 1.5. Patient has multiple hospitalization in the past secondary to decompensated liver failure. Patient is currently on transplant team. During current admission patient was getting lasix and albumin intermittently for VICKY however renal function is not getting better. Patient hospital course is complicated by hepatic encephalopathy with some improvement of mentation with lactulose. Also treated for cellulitis with 7 days of vancomycin. Concern for seizures, EEG with out evidence of seizure activities. Patient had paracentesis on current admit but no evidence for SBP, only 1 lt removed. Nephrology is consulted for worsening/not improving VICKY despite lasix/albumin.     Per chart review patient has no hypotensive episodes, BP mostly above 100's. Has not received nephrotoxins such as NSAIDs/contrast media. No sever sepsis/septic shock or acute blood loss during current admit. UA with 2+ leukocytes and Hyaline casts, no wbc/rbc cast or proteinuria. Urine Na+ < 20.    Patient has no complaints when seen today. Family at bedside states his mentation has gotten better today, more lucid. Has increased stool out put as many as 10x daily at times, averages x4 daily. Appetite and enery level is poor but seems to be improving today per wife.       Interval History: Improving renal function after bicarb drip. Cr 1.6 from 2.0. Hypernatremia 154 suggestive of on going intravascular  depletion. Will start D5 today and check labs in the evening. Still making urine UOP 1200 cc/24hrs     Review of patient's allergies indicates:   Allergen Reactions    Nsaids (non-steroidal anti-inflammatory drug)      D/t to liver disease.    Tylenol [acetaminophen]      D/t liver disease.    Penicillins Other (See Comments)     Tolerated pip-tazo in 8/2016 without issue  Since childhood was told not to take it     Current Facility-Administered Medications   Medication Frequency    acetaminophen tablet 650 mg Q8H PRN    dextrose 5 % infusion Continuous    diphenhydrAMINE-zinc acetate 1-0.1% cream TID PRN    ergocalciferol capsule 50,000 Units Q7 Days    fluconazole tablet 200 mg Daily    heparin (porcine) injection 5,000 Units Q8H    lactulose 10 gram/15 mL solution (enema) 200 g Q6H PRN    lactulose 20 gram/30 mL solution Soln 45 g TID    lactulose 20 gram/30 mL solution Soln 45 g Q6H PRN    levETIRAcetam tablet 500 mg BID    lidocaine 5 % patch 2 patch Q24H    metoclopramide HCl injection 10 mg Q6H PRN    omega-3 acid ethyl esters capsule 2 g Daily with breakfast    ondansetron disintegrating tablet 8 mg Q8H PRN    pantoprazole EC tablet 40 mg BID    propranolol 20 mg/5 mL (4 mg/mL) solution 5 mg BID    rifAXIMin tablet 550 mg BID    sertraline tablet 50 mg Daily    sodium bicarbonate tablet 1,300 mg TID    sodium chloride 0.9% flush 3 mL PRN       Objective:     Vital Signs (Most Recent):  Temp: 97.4 °F (36.3 °C) (07/10/18 1223)  Pulse: 62 (07/10/18 1223)  Resp: 16 (07/10/18 1223)  BP: (!) 149/63 (07/10/18 1223)  SpO2: 99 % (07/10/18 1223)  O2 Device (Oxygen Therapy): room air (07/10/18 1223) Vital Signs (24h Range):  Temp:  [96.2 °F (35.7 °C)-98.5 °F (36.9 °C)] 97.4 °F (36.3 °C)  Pulse:  [62-78] 62  Resp:  [16-20] 16  SpO2:  [97 %-100 %] 99 %  BP: ()/(49-63) 149/63     Weight: 129.3 kg (285 lb 0.9 oz) (07/10/18 0345)  Body mass index is 36.6 kg/m².  Body surface area is 2.6 meters  squared.    I/O last 3 completed shifts:  In: 2557.5 [P.O.:1850; I.V.:707.5]  Out: 1200 [Urine:1200]    Physical Exam   Constitutional: He is oriented to person, place, and time.   Patient up in chair, eating breakfast this morning   Poor dentition    HENT:   Head: Normocephalic and atraumatic.   Eyes: EOM are normal. Pupils are equal, round, and reactive to light.   Neck: Normal range of motion. Neck supple. No JVD present.   Cardiovascular: Normal rate and regular rhythm.    No murmur heard.  Pulmonary/Chest: Effort normal and breath sounds normal. No respiratory distress. He has no wheezes. He has no rales.   Abdominal: Soft. He exhibits no distension. There is no tenderness.   Active bowel sounds with succussion splash     Flapping tremors bilaterally    Musculoskeletal: Normal range of motion. He exhibits edema.   Neurological: He is alert and oriented to person, place, and time. No cranial nerve deficit.   Skin: Skin is dry. Capillary refill takes less than 2 seconds. There is erythema.   Dry scally erythematous rash in the bilateral LE with 2+ pitting edema    Also pitting edema on bilateral UE    Psychiatric: He has a normal mood and affect. His behavior is normal.       Significant Labs:  CBC:   Recent Labs  Lab 07/10/18  0629   WBC 3.58*   RBC 2.48*   HGB 8.4*   HCT 25.1*   PLT 44*   *   MCH 33.9*   MCHC 33.5     CMP:   Recent Labs  Lab 07/10/18  0628   *   CALCIUM 10.2   ALBUMIN 2.9*   PROT 5.2*   *   K 4.1   CO2 20*   *   BUN 39*   CREATININE 1.6*   ALKPHOS 97   ALT 42   AST 50*   BILITOT 2.2*     All labs within the past 24 hours have been reviewed.           Assessment/Plan:     * Acute kidney injury superimposed on chronic kidney disease    64 yo male with acute decompensated liver failure secondary to alcohol cirrhosis now with sCr trending up/with out improvement nephrology consulted for VICKY. Etiology most likely pre-renal. Also considered HRS but less likely given optimal  BP and renal function labs.     -sCr at baseline 1.4-1.5   -Renal function with out much improvement despite lasix and albumin   -U & L edema on physical exam today  -Increased stool out put 2/2 lactulose for HE  -No hypotensive episodes/blood loss/septic shock/nephrotoxind during current admit  -UA with hyaline cast, no proteinuria, has some pyuria, urine Na+ < 20, Urine Osmolarity increased (426) - more suggestive of pre-renal etiology  -Evidence of intravascular volume depletion given hypernatremia as well as increased stool out put, previously was also getting daily lasix until d/c recently  -HRS less likely- patient jameson likely volume depleted intravascularly: hypernatremia, low bicarb. Optimal BP.     Recommendations:  -Repeat UA, UPC,  -Renal US with corticomedullary thickening and left non obstructing stone  -Will get 2D Echo - normal EF/no diastolic dysfunction, IVC with > 50 collapse on sniff suggestive of hypovolemia  -Will spin urine once collected  -Cr improved to 1.6 from 2.0 with Light hydration with (D5 + 2 amp HCO3 for 12 hours) yesterday  -Hypernatremic with free water deficit over 5 lt, will start D5 @ 100 cc/hr and check BMP in the evening. (ordered)  -Encourage PO intake  -will reassess in the PM             Thank you for your consult. I will follow-up with patient. Please contact us if you have any additional questions.    Rogelio Nickerson MD  Nephrology  [facility]

## 2018-07-10 NOTE — PROCEDURES
Radiology Post-Procedure Note    Pre Op Diagnosis: Ascites  Post Op Diagnosis: Same    Procedure: Paracentesis    Procedure performed by: Solange Huff MD    Written Informed Consent Obtained: Yes  Specimen Removed: YES   Estimated Blood Loss: Minimal    Findings:   Successful paracentesis.  Albumin administered PRN per protocol.    Patient tolerated procedure well. See dictated note for further details and amount drained.     Solange Huff MD  IR Resident

## 2018-07-10 NOTE — PT/OT/SLP PROGRESS
Occupational Therapy   Treatment    Name: Alon Adamson  MRN: [id number]  Admitting Diagnosis:  Acute kidney injury superimposed on chronic kidney disease       Recommendations:     Discharge Recommendations: nursing facility, skilled  Discharge Equipment Recommendations:  walker, rolling  Barriers to discharge:  None    Subjective     Communicated with: RN prior to session. Pt found supine in bed with wife, son, and mother present. Pt's family and pt agreeable to therapy session.   Pt disoriented to date, place, and situation. Delayed responses to all questions directed to pt throughout session.     Pain/Comfort:  · Pain Rating 1: 0/10  · Pain Rating Post-Intervention 1: 0/10    Patients cultural, spiritual, Islam conflicts given the current situation: none stated     Objective:     Patient found with: telemetry, peripheral IV    General Precautions: Standard, fall   Orthopedic Precautions:N/A   Braces: N/A     Occupational Performance:    Bed Mobility:    · Patient completed Supine to Sit with stand by assistance <> contact guard assistance with HOB elevated and siderail.     Functional Mobility/Transfers:  · Patient completed Sit <> Stand Transfer from EOB with minimum assistance  with bedrail    · Patient completed Bed <> Chair Transfer using Stand Pivot technique with moderate assistance with hand-held assist; pt required max verbal cues for advancement of B LEs for step sequence towards wheelchair with tactile cues provided by 2nd person for step sequence due to pt's impaired cognition.     Activities of Daily Living:  · Upper Body Dressing: maximal assistance to don gown like robe seated EOB  · Lower Body Dressing: total assistance to don B  socks     Patient left up in wheelchair  with all lines intact, call button in reach and NP, wife, son, and RN  present    [facility] 6 Click:  [facility] Total Score: 16    Treatment & Education:  Pt educated by therapist on:   - Pt educated on role of OT, POC, and  goals for therapy.    - Daily orientation provided at beginning of therapy session. Pt disoriented to time, place, and situation. Pt demo'd impaired cognition this date with noted delayed responses to questions and not answering questions appropriately. Increased time required for response to questions and mobility.   - Pt required education on safe transfers and step sequence   - Importance of OOB ax's with staff member assistance and sitting OOB majority of day.   - Pt completed ADLs and functional mobility for treatment session as noted above   Education:    Assessment:     Alon Adamson is a 63 y.o. male with a medical diagnosis of Acute kidney injury superimposed on chronic kidney disease.  He presents with performance deficits affecting function are weakness, impaired endurance, impaired functional mobilty, impaired self care skills, gait instability, impaired balance, impaired cognition, edema, impaired skin, decreased safety awareness, decreased lower extremity function, decreased upper extremity function, impaired coordination.  D/c recs changed to SNF due to pt's increased assistance level  for mobility and increased confusion/delayed responses. Pt required increased assist for stand pivot transfer from EOB to wheelchair requiring max verbal cues for step sequence and mod A for standing balance. Pt will continue to benefit from skilled OT in order to address the previously stated deficits.     Rehab Prognosis:  Fair  <> good ; patient would benefit from acute skilled OT services to address these deficits and reach maximum level of function.       Plan:     Patient to be seen 3 x/week to address the above listed problems via self-care/home management, therapeutic activities, therapeutic exercises  · Plan of Care Expires: 07/28/18  · Plan of Care Reviewed with: patient, family    This Plan of care has been discussed with the patient who was involved in its development and understands and is in  agreement with the identified goals and treatment plan    GOALS:    Occupational Therapy Goals        Problem: Occupational Therapy Goal    Goal Priority Disciplines Outcome Interventions   Occupational Therapy Goal     OT, PT/OT Ongoing (interventions implemented as appropriate)    Description:  Goals to be met by: 7/13/18     Patient will increase functional independence with ADLs by performing:    Grooming while standing at sink with Stand-by Assistance. - MET 7/2  Toileting from toilet with Stand-by Assistance for hygiene and clothing management.  Toilet transfer to toilet with Stand-by Assistance.MET 7/2  Upper extremity exercise program x15 reps per handout, with assistance as needed.  Pt will engage in functional mobility to simulate household distances in order to maximize functional activity tolerance required for engagement in occupations of choice with SBA                            Time Tracking:     OT Date of Treatment: 07/10/18  OT Start Time: 0826  OT Stop Time: 0851  OT Total Time (min): 25 min    Billable Minutes:Therapeutic Activity 25    Abbey Barnard OT  7/10/2018

## 2018-07-11 LAB
ALBUMIN SERPL BCP-MCNC: 3.1 G/DL
ALP SERPL-CCNC: 88 U/L
ALT SERPL W/O P-5'-P-CCNC: 40 U/L
ANION GAP SERPL CALC-SCNC: 7 MMOL/L
ANION GAP SERPL CALC-SCNC: 8 MMOL/L
AST SERPL-CCNC: 50 U/L
BASOPHILS # BLD AUTO: 0.06 K/UL
BASOPHILS NFR BLD: 2.1 %
BILIRUB SERPL-MCNC: 2.2 MG/DL
BUN SERPL-MCNC: 29 MG/DL
BUN SERPL-MCNC: 31 MG/DL
CALCIUM SERPL-MCNC: 10 MG/DL
CALCIUM SERPL-MCNC: 10 MG/DL
CHLORIDE SERPL-SCNC: 119 MMOL/L
CHLORIDE SERPL-SCNC: 124 MMOL/L
CO2 SERPL-SCNC: 20 MMOL/L
CO2 SERPL-SCNC: 21 MMOL/L
CREAT SERPL-MCNC: 1.5 MG/DL
CREAT SERPL-MCNC: 1.5 MG/DL
DIFFERENTIAL METHOD: ABNORMAL
EOSINOPHIL # BLD AUTO: 0.4 K/UL
EOSINOPHIL NFR BLD: 12.9 %
ERYTHROCYTE [DISTWIDTH] IN BLOOD BY AUTOMATED COUNT: 20.4 %
EST. GFR  (AFRICAN AMERICAN): 56.9 ML/MIN/1.73 M^2
EST. GFR  (AFRICAN AMERICAN): 56.9 ML/MIN/1.73 M^2
EST. GFR  (NON AFRICAN AMERICAN): 49.2 ML/MIN/1.73 M^2
EST. GFR  (NON AFRICAN AMERICAN): 49.2 ML/MIN/1.73 M^2
GLUCOSE SERPL-MCNC: 113 MG/DL
GLUCOSE SERPL-MCNC: 180 MG/DL
HCT VFR BLD AUTO: 26 %
HGB BLD-MCNC: 8.7 G/DL
IMM GRANULOCYTES # BLD AUTO: 0.01 K/UL
IMM GRANULOCYTES NFR BLD AUTO: 0.3 %
INR PPP: 1.7
LYMPHOCYTES # BLD AUTO: 0.9 K/UL
LYMPHOCYTES NFR BLD: 32.2 %
MAGNESIUM SERPL-MCNC: 2.3 MG/DL
MCH RBC QN AUTO: 34.3 PG
MCHC RBC AUTO-ENTMCNC: 33.5 G/DL
MCV RBC AUTO: 102 FL
MONOCYTES # BLD AUTO: 0.4 K/UL
MONOCYTES NFR BLD: 14.7 %
NEUTROPHILS # BLD AUTO: 1.1 K/UL
NEUTROPHILS NFR BLD: 37.8 %
NRBC BLD-RTO: 0 /100 WBC
PHOSPHATE SERPL-MCNC: 2.8 MG/DL
PLATELET # BLD AUTO: 54 K/UL
PMV BLD AUTO: 12.8 FL
POTASSIUM SERPL-SCNC: 4 MMOL/L
POTASSIUM SERPL-SCNC: 4.1 MMOL/L
PROT SERPL-MCNC: 5.2 G/DL
PROTHROMBIN TIME: 16.5 SEC
RBC # BLD AUTO: 2.54 M/UL
SODIUM SERPL-SCNC: 147 MMOL/L
SODIUM SERPL-SCNC: 152 MMOL/L
WBC # BLD AUTO: 2.86 K/UL

## 2018-07-11 PROCEDURE — 97116 GAIT TRAINING THERAPY: CPT | Mod: NTX

## 2018-07-11 PROCEDURE — 25000003 PHARM REV CODE 250: Mod: NTX | Performed by: INTERNAL MEDICINE

## 2018-07-11 PROCEDURE — 63600175 PHARM REV CODE 636 W HCPCS: Mod: NTX | Performed by: NURSE PRACTITIONER

## 2018-07-11 PROCEDURE — 84100 ASSAY OF PHOSPHORUS: CPT | Mod: NTX

## 2018-07-11 PROCEDURE — 85610 PROTHROMBIN TIME: CPT | Mod: NTX

## 2018-07-11 PROCEDURE — 85025 COMPLETE CBC W/AUTO DIFF WBC: CPT | Mod: NTX

## 2018-07-11 PROCEDURE — 97530 THERAPEUTIC ACTIVITIES: CPT | Mod: NTX

## 2018-07-11 PROCEDURE — G8979 MOBILITY GOAL STATUS: HCPCS | Mod: CK,NTX

## 2018-07-11 PROCEDURE — 99233 SBSQ HOSP IP/OBS HIGH 50: CPT | Mod: NTX,,, | Performed by: NURSE PRACTITIONER

## 2018-07-11 PROCEDURE — 25000003 PHARM REV CODE 250: Mod: NTX | Performed by: PHYSICIAN ASSISTANT

## 2018-07-11 PROCEDURE — 25000003 PHARM REV CODE 250: Mod: NTX | Performed by: NURSE PRACTITIONER

## 2018-07-11 PROCEDURE — 36415 COLL VENOUS BLD VENIPUNCTURE: CPT | Mod: NTX

## 2018-07-11 PROCEDURE — 20600001 HC STEP DOWN PRIVATE ROOM: Mod: NTX

## 2018-07-11 PROCEDURE — 63600175 PHARM REV CODE 636 W HCPCS: Mod: NTX | Performed by: PHYSICIAN ASSISTANT

## 2018-07-11 PROCEDURE — 25000003 PHARM REV CODE 250: Mod: NTX | Performed by: STUDENT IN AN ORGANIZED HEALTH CARE EDUCATION/TRAINING PROGRAM

## 2018-07-11 PROCEDURE — 80053 COMPREHEN METABOLIC PANEL: CPT | Mod: NTX

## 2018-07-11 PROCEDURE — 97535 SELF CARE MNGMENT TRAINING: CPT | Mod: NTX

## 2018-07-11 PROCEDURE — 83735 ASSAY OF MAGNESIUM: CPT | Mod: NTX

## 2018-07-11 PROCEDURE — 80048 BASIC METABOLIC PNL TOTAL CA: CPT | Mod: NTX

## 2018-07-11 PROCEDURE — G8980 MOBILITY D/C STATUS: HCPCS | Mod: CK,NTX

## 2018-07-11 RX ORDER — DEXTROSE MONOHYDRATE 50 MG/ML
INJECTION, SOLUTION INTRAVENOUS CONTINUOUS
Status: ACTIVE | OUTPATIENT
Start: 2018-07-11 | End: 2018-07-11

## 2018-07-11 RX ADMIN — LACTULOSE 45 G: 20 SOLUTION ORAL at 08:07

## 2018-07-11 RX ADMIN — METOCLOPRAMIDE 10 MG: 5 INJECTION, SOLUTION INTRAMUSCULAR; INTRAVENOUS at 11:07

## 2018-07-11 RX ADMIN — OMEGA-3-ACID ETHYL ESTERS 2 G: 1 CAPSULE, LIQUID FILLED ORAL at 08:07

## 2018-07-11 RX ADMIN — PANTOPRAZOLE SODIUM 40 MG: 40 TABLET, DELAYED RELEASE ORAL at 08:07

## 2018-07-11 RX ADMIN — LACTULOSE 45 G: 20 SOLUTION ORAL at 03:07

## 2018-07-11 RX ADMIN — PROPRANOLOL HYDROCHLORIDE 5 MG: 20 SOLUTION ORAL at 08:07

## 2018-07-11 RX ADMIN — SODIUM BICARBONATE 650 MG TABLET 1300 MG: at 08:07

## 2018-07-11 RX ADMIN — RIFAXIMIN 550 MG: 550 TABLET ORAL at 08:07

## 2018-07-11 RX ADMIN — SODIUM BICARBONATE 650 MG TABLET 1300 MG: at 03:07

## 2018-07-11 RX ADMIN — LACTULOSE 45 G: 10 SOLUTION ORAL at 11:07

## 2018-07-11 RX ADMIN — FLUCONAZOLE 200 MG: 200 TABLET ORAL at 08:07

## 2018-07-11 RX ADMIN — HEPARIN SODIUM 5000 UNITS: 5000 INJECTION, SOLUTION INTRAVENOUS; SUBCUTANEOUS at 01:07

## 2018-07-11 RX ADMIN — LEVETIRACETAM 500 MG: 500 TABLET, FILM COATED ORAL at 08:07

## 2018-07-11 RX ADMIN — LIDOCAINE 2 PATCH: 50 PATCH TOPICAL at 12:07

## 2018-07-11 RX ADMIN — SERTRALINE HYDROCHLORIDE 50 MG: 50 TABLET ORAL at 08:07

## 2018-07-11 RX ADMIN — DEXTROSE: 5 SOLUTION INTRAVENOUS at 08:07

## 2018-07-11 RX ADMIN — HEPARIN SODIUM 5000 UNITS: 5000 INJECTION, SOLUTION INTRAVENOUS; SUBCUTANEOUS at 08:07

## 2018-07-11 RX ADMIN — HEPARIN SODIUM 5000 UNITS: 5000 INJECTION, SOLUTION INTRAVENOUS; SUBCUTANEOUS at 05:07

## 2018-07-11 NOTE — PROGRESS NOTES
Sitting up in the wheelchair looking [place].   Follow up on BLE xerosis, remains scaly but is having some weeping with edema.   Nursing staff reports weeping is much improved on his extremities  Continue Sween moisturizer to BLE.  Rickie Davis RN CWON  [id number]

## 2018-07-11 NOTE — PT/OT/SLP PROGRESS
"Physical Therapy Treatment    Patient Name:  Alon Adamson   MRN:  [id number]    Recommendations:     Discharge Recommendations:  nursing facility, skilled   Discharge Equipment Recommendations: walker, rolling   Barriers to discharge: Inaccessible home and Decreased caregiver support    Assessment:     Alon Adamson is a 63 y.o. male admitted with a medical diagnosis of Acute kidney injury superimposed on chronic kidney disease.  He presents with the following impairments/functional limitations:  weakness, impaired functional mobilty, gait instability, impaired endurance, impaired balance, impaired self care skills, decreased lower extremity function, decreased upper extremity function, impaired coordination, impaired cardiopulmonary response to activity, decreased safety awareness.  Pt tolerated session c c/o fatigue.  Per pt's family - family attempted to stand pt earlier in day and pt fell backwards d/t LOB - family able to catch pt.  Pt required SBA-min A for functional mobility on this date.  Pt only able to amb short distance d/t fatigue.  Pt would benefit from continued skilled acute PT 4x/wk to improve functional mobility.      Rehab Prognosis:  fair; patient would benefit from acute skilled PT services to address these deficits and reach maximum level of function.      Recent Surgery: * No surgery found *      Plan:     During this hospitalization, patient to be seen 4 x/week to address the above listed problems via gait training, therapeutic activities, therapeutic exercises, neuromuscular re-education  · Plan of Care Expires:  07/23/18   Plan of Care Reviewed with: patient    Subjective     Communicated with RN and OT prior to session.  Patient found HOB elevated and family present upon PT entry to room, agreeable to treatment.      Chief Complaint: fatigue  Patient comments/goals: "I need to go to the potty." "not as much as I should be." - when asked if pt has been performing " therex  Pain/Comfort:  · Pain Rating 1: 0/10    Patients cultural, spiritual, Jainism conflicts given the current situation: none    Objective:     Patient found with: peripheral IV, telemetry     General Precautions: Standard, fall   Orthopedic Precautions:N/A   Braces: N/A     Functional Mobility:  · Bed Mobility:     · Rolling Right: stand by assistance  · Scooting: stand by assistance  · Supine to Sit: stand by assistance  · Sit to Supine: stand by assistance  · Transfers:     · Sit to Stand:  minimum assistance with rolling walker  · 1x from bed; 1x from BSC  · Toilet Transfer: contact guard assistance with  rolling walker  using  Stand Pivot  · Gait: 4 steps; 6 steps c RW CGA  · Difficulty weight shifting, no LOB, c/o fatigue  · Balance: static standing (CGA); dynamic standing (CGA)      AM-PAC 6 CLICK MOBILITY  Turning over in bed (including adjusting bedclothes, sheets and blankets)?: 3  Sitting down on and standing up from a chair with arms (e.g., wheelchair, bedside commode, etc.): 3  Moving from lying on back to sitting on the side of the bed?: 3  Moving to and from a bed to a chair (including a wheelchair)?: 3  Need to walk in hospital room?: 3  Climbing 3-5 steps with a railing?: 2  Basic Mobility Total Score: 17       Therapeutic Activities and Exercises:  Educated on PT role/POC; safety c mobility; benefits of OOB activities; importance of performing therex - v/u  Static standing 2x2min  Marching in place c RW: 7x BLE to assist c weight shifting during gait  Assisted to BSC for voiding - requiring assistance c susanna-hygiene  Repositioned in bed for comfort    Patient left HOB elevated with all lines intact, call button in reach, RN notified and family present..    GOALS:    Physical Therapy Goals        Problem: Physical Therapy Goal    Goal Priority Disciplines Outcome Goal Variances Interventions   Physical Therapy Goal     PT/OT, PT Ongoing (interventions implemented as appropriate)      Description:  Goals to be met by: 18     Patient will increase functional independence with mobility by performin. Supine to sit with Stand-by Assistance. - GOAL MET   2. Sit to stand with S.  3. Gait x 200 ft with Supervision with RW.  4. Ascend/descend 5 stair with right Handrails with SBA.  5. Lower extremity exercise program x 20 reps, with supervision, in order to increase LE strength and (I) with functional mobility.   6. Pt to perf 6MWT: amb 440', to demonstrate a clinically significant improvement in aerobic capacity.                               Time Tracking:     PT Received On: 18  PT Start Time: 1417     PT Stop Time: 1459  PT Total Time (min): 42 min     Billable Minutes: Gait Training 15 min and Therapeutic Activity 10 min - co-treat c OT    Treatment Type: Treatment  PT/PTA: PT     PTA Visit Number: 0     Mckinley Senior, PT  2018

## 2018-07-11 NOTE — PLAN OF CARE
Problem: Physical Therapy Goal  Goal: Physical Therapy Goal  Goals to be met by: 18     Patient will increase functional independence with mobility by performin. Supine to sit with Stand-by Assistance. - GOAL MET   2. Sit to stand with S.  3. Gait x 200 ft with Supervision with RW.  4. Ascend/descend 5 stair with right Handrails with SBA.  5. Lower extremity exercise program x 20 reps, with supervision, in order to increase LE strength and (I) with functional mobility.   6. Pt to perf 6MWT: amb 440', to demonstrate a clinically significant improvement in aerobic capacity.              Outcome: Ongoing (interventions implemented as appropriate)  Progressing    Mckinley Senior DPT  2018

## 2018-07-11 NOTE — PROGRESS NOTES
[facility]  Liver Transplant  Progress Note    Patient Name: Aoln Adamson  MRN: [id number]  Admission Date: 6/22/2018  Hospital Length of Stay: 19 days  Code Status: Full Code  Primary Care Provider: Mckinley Vides MD    Subjective:     History of Present Illness:  Mr. Adamson is a 64 yo M with PMH ESLD secondary to ETOH cirrhosis, CKD3, CAD.  Complications of liver disease include hyperbilirubinemia, hypoalbuminemia, severe malnutrition,   hepatic encephalopathy, thrombocytopenia, splenomegaly, ascites, hypervolemia, coagulopathy, portal HTN.  Pt recently admitted for severe hepatic encephalopathy requiring intubation but has been stable from mentation standpoint post resolution of acute enceophalopathy. Mr. Adamson was seen in clinic 6/22 and was noted to have significant hypervolemia, weeping from BLE, and VICKY on CKD3 on routine labs.  His MELD increased from 22 to 26 per labs- coordinator notified and MELD updated.  Cr elevated to 2.3 from baseline of 1.4.  Also with worsening ascites.  Pt reported increased pruritis, generalized fatigue and weakness.  He presented in wheelchair.  ROS otherwise negative.      Hospital Course:  Patient is listed for liver transplant, placed on internal hold 6/25 for HE then status 7 on 6/26 due to change in prescription coverage. He was re-activated on the list 7/3, MELD 22. Pt started on IV diuretics 6/22 and continued 6/23 and 6/24.  BLE edema and ascites signif improved with diuresis and kidney function also improved. During admission, patient had worsening encephalopathy and asterixis prompting infectious work up, all blood and urine cultures obtained during admission with NGTD. Intermittently, encephalopathy severe enough to warrant lactulose enemas. CT head obtained and negative. Paracentesis 6/25 negative for peritonitis per cell count and U/A negative. Pt also with concern for BLE cellulitis- upper legs bright red with lower BLE still with dark  appearance of venous stasis. Placed on vancomycin with improvement in BLE redness 6/26. Vanc continued for 7 days (ended 7/1). His HE waxed and waned for several days requiring lactulose enemas. Pt eating minimally during period of encephalopathy requiring gentle hydration with IVF 6/26. Of note, taco placement attempted 6/26 but unable secondary to agitation upon insertion prompting self resolving nose bleed. Micro lab called 6/26 PM with blood cx + for G+C in blood- pt continued on vanc which was started 6/25. ID consulted, suspect + blood cultures contaminant. EEG obtained 6/26 as pt with h/o serizures and negative. Pt continued on home Keppra. VICKY on admission initially improved with diuresis, but Cr sakina intermittently during admission likely related to aggressive diuresis. Of note, with chest pain overnight 6/26-6/27 that resolved without intervention and pt reported as mild.  EKG NSR with PVC, troponin 0.1 in setting of hypervolemia + VICKY.  Normal dobutamine stress 1/2018.  Cardiology consulted and felt not ACS, no need for further workup other than continue to treat current risk factors for CAD with ASA (pt already on ASA and statin as outpt) + BB for portal hypertension which was started. Repeat paracentesis 7/10, 4.8L off, no SBP. Hypernatremia noted 7/9, nephrology consulted to aid in management. Hypernatremia attributed to dehydration from frequent BM's, diuretics were held, d/c'd creon due to excessive diarrhea, and patient was treated with increasing po free water and IVF. Echo 7/9 with normal EF, no wall abnormality, mild tricuspid/mitral regurg.     Interval History: no acute events overnight. Improving clinically. Mildly confused and lethargic late yesterday, but cleared around 8pm, now AAOx4, remains delayed, + asterixis. Na remains elevated, D5W infusion continued today. Increase po free water, need to monitor fluid status very closely. Para yest, 4.8L off, no SBP. Continue pre-hab protocol for  malnutrition, holding creon due to excessive diarrhea. Continue lactulose. Continues to work with PT/OT, spends several hours in wheelchair. Cultures drawn 7/9 NGTD. Remains listed with MELD 22, listing tenuous based on clinical condition (malnutrition and debility).    Scheduled Meds:   ergocalciferol  50,000 Units Oral Q7 Days    fluconazole  200 mg Oral Daily    heparin (porcine)  5,000 Units Subcutaneous Q8H    lactulose  45 g Oral TID    levETIRAcetam  500 mg Oral BID    lidocaine  2 patch Transdermal Q24H    omega-3 acid ethyl esters  2 g Oral Daily with breakfast    pantoprazole  40 mg Oral BID    propranolol  5 mg Oral BID    rifAXImin  550 mg Oral BID    sertraline  50 mg Oral Daily    sodium bicarbonate  1,300 mg Oral TID     Continuous Infusions:   dextrose 5 % 100 mL/hr at 07/11/18 0848     PRN Meds:acetaminophen, diphenhydrAMINE-zinc acetate 1-0.1%, lactulose, lactulose, metoclopramide HCl, ondansetron, sodium chloride 0.9%    Review of Systems   Constitutional: Positive for appetite change (wife reports, patient does not much) and fatigue. Negative for activity change, chills and fever.   HENT: Negative for mouth sores, sore throat and trouble swallowing.    Eyes: Negative for visual disturbance.   Respiratory: Positive for shortness of breath (upon activity). Negative for wheezing.    Cardiovascular: Positive for leg swelling. Negative for chest pain.   Gastrointestinal: Positive for diarrhea (lactulose), nausea and vomiting. Negative for abdominal distention and abdominal pain.   Endocrine: Negative for polydipsia and polyuria.   Genitourinary: Negative for decreased urine volume and difficulty urinating.   Musculoskeletal: Positive for arthralgias (both shoulders have been hurting for last 2 weeks, right > left, improved with lidocaine patches).   Skin: Negative for rash.   Neurological: Positive for weakness. Negative for dizziness, syncope and headaches.   Hematological:  Bruises/bleeds easily.   Psychiatric/Behavioral: Negative for agitation, confusion, hallucinations and sleep disturbance.     Objective:     Vital Signs (Most Recent):  Temp: 97.2 °F (36.2 °C) (07/11/18 1247)  Pulse: (!) 50 (07/11/18 1247)  Resp: 16 (07/11/18 1247)  BP: (!) 116/53 (07/11/18 1247)  SpO2: 97 % (07/11/18 1247) Vital Signs (24h Range):  Temp:  [97.2 °F (36.2 °C)-98.5 °F (36.9 °C)] 97.2 °F (36.2 °C)  Pulse:  [50-78] 50  Resp:  [16-20] 16  SpO2:  [95 %-100 %] 97 %  BP: (102-130)/(50-65) 116/53     Weight: 124.6 kg (274 lb 11.1 oz)  Body mass index is 35.27 kg/m².    Intake/Output - Last 3 Shifts       07/09 0700 - 07/10 0659 07/10 0700 - 07/11 0659 07/11 0700 - 07/12 0659    P.O. 1850 680     I.V. (mL/kg) 707.5 (5.5) 568.3 (4.6)     Total Intake(mL/kg) 2557.5 (19.8) 1248.3 (10)     Urine (mL/kg/hr) 1200 (0.4) 0 (0)     Emesis/NG output  0 (0)     Other  4800 (1.6)     Stool  0 (0)     Blood  0 (0)     Total Output 1200 4800      Net +1357.5 -3551.7             Urine Occurrence 1 x 4 x     Stool Occurrence 8 x 6 x     Emesis Occurrence  0 x           Physical Exam   Constitutional: He is oriented to person, place, and time. He appears well-developed and well-nourished.   HENT:   Head: Normocephalic and atraumatic.   Mouth/Throat: No oropharyngeal exudate.   Eyes: EOM are normal. Pupils are equal, round, and reactive to light. Scleral icterus is present.   Neck: No JVD present.   Cardiovascular: Exam reveals no gallop and no friction rub.    Murmur heard.  Pulmonary/Chest: Effort normal and breath sounds normal. No stridor. No respiratory distress. He has no wheezes. He has no rales.   Abdominal: Soft. Bowel sounds are normal. He exhibits distension (mild). He exhibits no mass.   Musculoskeletal: He exhibits edema (+2/+3 generalized edema). He exhibits no tenderness.   Lymphadenopathy:     He has no cervical adenopathy.   Neurological: He is alert and oriented to person, place, and time.   Has asterixis and  baseline tremor    Skin: Skin is warm and dry. No rash noted. No erythema.   BLE with chronic venous stasis changes, crusty/flaky skin intact without wounds   Psychiatric: He has a normal mood and affect. His behavior is normal.       Laboratory:  Immunosuppressants     None        CBC:   Recent Labs  Lab 07/11/18 0439   WBC 2.86*   RBC 2.54*   HGB 8.7*   HCT 26.0*   PLT 54*   *   MCH 34.3*   MCHC 33.5     CMP:   Recent Labs  Lab 07/11/18 0439   *   CALCIUM 10.0   ALBUMIN 3.1*   PROT 5.2*   *   K 4.0   CO2 21*   *   BUN 31*   CREATININE 1.5*   ALKPHOS 88   ALT 40   AST 50*   BILITOT 2.2*     Coagulation:   Recent Labs  Lab 07/11/18 0439   INR 1.7*     Labs within the past 24 hours have been reviewed.    Diagnostic Results:  I have personally reviewed all pertinent imaging studies.    Assessment/Plan:     * Acute kidney injury superimposed on chronic kidney disease    - VICKY initially improving with albumin infusion but with minimal intake 6/25 and 6/26.  - Bicarb 18, on Na Bicarb 1300 mg TID  - Holding diuretics (previously lasix 40mg daily) due to elevated Cr  - nephrology consulted due to hypervolemia and multiple electrolyte abnormalities (acidosis, hypernatremia)  - Cr improving with hydration        Hypernatremia    - worsening hypernatremia likely related to dehydration from GI loss with lactulose, large volume para 7/10 (4.8L off)  - nephrology consulted  - Na worsened this AM, encouraging patient to drink water, d5w infusion started per neph, monitor fluid status very closely as patient easily becomes hypervolemic         Decompensated hepatic cirrhosis    - Placed status 7 on 6/26 for prescription drug coverage change. Re-activated 7/3, MELD 22. Listing tenuous due to frailty and malnutrition.    - propanolol decreased to 5mg BID, monitor BP closely    MELD-Na score: 19 at 7/11/2018  4:39 AM  MELD score: 19 at 7/11/2018  4:39 AM  Calculated from:  Serum Creatinine: 1.5 mg/dL at  "7/11/2018  4:39 AM  Serum Sodium: 152 mmol/L (Rounded to 137) at 7/11/2018  4:39 AM  Total Bilirubin: 2.2 mg/dL at 7/11/2018  4:39 AM  INR(ratio): 1.7 at 7/11/2018  4:39 AM  Age: 63 years        Hepatic encephalopathy    - Acute on chronic  - continue rifaximin 550 mg BID and lactulose, goal 5 BM's/day, easily becomes encephalopathic  - infx workup blood/urine/ascites neg to date.  Has been off cefepime/vanc, d/c'd 7/1.   - adjusting lactulose regimen to prevent acute HE. PRN lactulose ordered in addition to scheduled dosing.   - worsening HE 7/9, blood and urine cultures NGTD, CXR clear, para 7/10 neg SBP        Severe protein-calorie malnutrition    - Dietary consulted for calorie count.  - Pt eating better accord to family.  Does take boost supplement.  - "pre-hab" regimen and supplements ordered. Dc creon due to excessive diarrhea  - Patient drinking at least 2-3 boost supplements and beneprotein daily.   - If continues to have low intake, consider starting Megace.   - c/o persistent nausea, vomiting overnight with brown emesis, KUB 7/9 with scattered bowel gas.         Hypoalbuminemia due to protein-calorie malnutrition    - see severe protein-calorie malnutrition        Dermatitis associated with moisture              Physical deconditioning    - PT/OT consulted.  - encourage OOBTC for several hours each day, walking with walker with PT        Ascites due to alcoholic cirrhosis    - Para 6/25 negative for infection   - Para 7/10 negative for infection  - holding diuretics due to dehydration/VICKY        Coagulopathy    - Secondary to decompensated liver disease.        CKD (chronic kidney disease), stage III    - see acute kidney injury superimposed on CKD III        Cholestatic pruritus    - cholestyramine has previously been ineffective, given depressed mood; will initiate zoloft for mood and pruritus.        Pancytopenia    - related to decompensated liver disease        Coronary artery disease involving " native coronary artery of native heart without angina pectoris    - Continue home ASA.  - 2D Echo 7/9 normal EF (55-60%), trivial tricuspid/mitral regurg.        Anemia of chronic disease    - H&H stable.  - 1 unit PRBC transfused 6/28.         Thrombocytopenia    - Secondary to splenomegaly from portal HTN- should improve with txp.  - No s/s overt bleed.        Seizures    - Oral Keppra transitioned to IV as pt not able to swallow 6/26.  - Resumed oral Keppra since mentation improved.  - EEG 6/26 negative.        Bilateral edema of lower extremity    - completed vanc x 7 days for BLE cellulitis  - edema improved with gentle diuresis and albumin PRN, holding diuretics for elevated Cr/dehydration  - 2D Echo 7/9 normal EF (55-60%), trivial tricuspid/mitral regurg.            VTE Risk Mitigation         Ordered     heparin (porcine) injection 5,000 Units  Every 8 hours      06/22/18 1502     IP VTE HIGH RISK PATIENT  Once      06/22/18 1502          The patients clinical status was discussed at multidisplinary rounds, involving transplant surgery, transplant medicine, pharmacy, nursing, nutrition, and social work    Discharge Planning: not stable for discharge at this time.    Rissa Regalado, BHARGAVI  Liver Transplant  [facility]

## 2018-07-11 NOTE — PLAN OF CARE
Problem: Patient Care Overview  Goal: Plan of Care Review  Outcome: Ongoing (interventions implemented as appropriate)  Pt AAOx4, VSS, in bed with upper siderails raised x2, bed in lowest/locked position, call light/personal belongings within reach. Pt instructed to call for assistance. Pt verbalizes understanding. Pt afebrile at this time.  Proper hand hygiene performed before and after pt care.       -Current MELD 26  -Paracentesis 7/10, 4800cc off  -US, 2D echo, CXR, & abd XR done 7/19   -EF 55-60%  -Urine + Blood cx NGTD  -Urine specimens sent   -D5W gtt d/c'ed @ 0000 per orders  -Low sodium diet followed  -Dehydrated, hypernatremic; po fluid intake encouraged   -Nightly lactulose held, >5bm and pt AAOx4  -Gluteal redness noted, applying clear barrier cream BID + PRN  -Turning pt q2h  -PT/OT working with pt      Will continue to monitor pt

## 2018-07-11 NOTE — ASSESSMENT & PLAN NOTE
- Acute on chronic  - continue rifaximin 550 mg BID and lactulose, goal 5 BM's/day, easily becomes encephalopathic  - infx workup blood/urine/ascites neg to date.  Has been off cefepime/vanc, d/c'd 7/1.   - adjusting lactulose regimen to prevent acute HE. PRN lactulose ordered in addition to scheduled dosing.   - worsening HE 7/9, blood and urine cultures NGTD, CXR clear, para 7/10 neg SBP

## 2018-07-11 NOTE — ASSESSMENT & PLAN NOTE
- Placed status 7 on 6/26 for prescription drug coverage change. Re-activated 7/3, MELD 22. Listing tenuous due to frailty and malnutrition.    - propanolol decreased to 5mg BID, monitor BP closely    MELD-Na score: 19 at 7/11/2018  4:39 AM  MELD score: 19 at 7/11/2018  4:39 AM  Calculated from:  Serum Creatinine: 1.5 mg/dL at 7/11/2018  4:39 AM  Serum Sodium: 152 mmol/L (Rounded to 137) at 7/11/2018  4:39 AM  Total Bilirubin: 2.2 mg/dL at 7/11/2018  4:39 AM  INR(ratio): 1.7 at 7/11/2018  4:39 AM  Age: 63 years

## 2018-07-11 NOTE — ASSESSMENT & PLAN NOTE
- worsening hypernatremia likely related to dehydration from GI loss with lactulose, large volume para 7/10 (4.8L off)  - nephrology consulted  - Na worsened this AM, encouraging patient to drink water, d5w infusion started per neph, monitor fluid status very closely as patient easily becomes hypervolemic

## 2018-07-11 NOTE — ASSESSMENT & PLAN NOTE
-Hypernatremia, 152 today from 154  -Patient likely dehydrated with over 6 tl free water deficit from increased stool out put, less PO intake  -Some improvement with D5 and PO intake  -Continue D5@ 100 cc/hr, daily BMP

## 2018-07-11 NOTE — SUBJECTIVE & OBJECTIVE
Interval History: Patient more lucid today. Cr 1.5 much improved, close to baseline, will continue D5. Hypernatremia 152 today.     Review of patient's allergies indicates:   Allergen Reactions    Nsaids (non-steroidal anti-inflammatory drug)      D/t to liver disease.    Tylenol [acetaminophen]      D/t liver disease.    Penicillins Other (See Comments)     Tolerated pip-tazo in 8/2016 without issue  Since childhood was told not to take it     Current Facility-Administered Medications   Medication Frequency    acetaminophen tablet 650 mg Q8H PRN    dextrose 5 % infusion Continuous    diphenhydrAMINE-zinc acetate 1-0.1% cream TID PRN    ergocalciferol capsule 50,000 Units Q7 Days    fluconazole tablet 200 mg Daily    heparin (porcine) injection 5,000 Units Q8H    lactulose 10 gram/15 mL solution (enema) 200 g Q6H PRN    lactulose 20 gram/30 mL solution Soln 45 g TID    lactulose 20 gram/30 mL solution Soln 45 g Q6H PRN    levETIRAcetam tablet 500 mg BID    lidocaine 5 % patch 2 patch Q24H    metoclopramide HCl injection 10 mg Q6H PRN    omega-3 acid ethyl esters capsule 2 g Daily with breakfast    ondansetron disintegrating tablet 8 mg Q8H PRN    pantoprazole EC tablet 40 mg BID    propranolol 20 mg/5 mL (4 mg/mL) solution 5 mg BID    rifAXIMin tablet 550 mg BID    sertraline tablet 50 mg Daily    sodium bicarbonate tablet 1,300 mg TID    sodium chloride 0.9% flush 3 mL PRN       Objective:     Vital Signs (Most Recent):  Temp: 97.4 °F (36.3 °C) (07/11/18 1518)  Pulse: 62 (07/11/18 1518)  Resp: 18 (07/11/18 1518)  BP: (!) 127/56 (07/11/18 1518)  SpO2: 100 % (07/11/18 1518)  O2 Device (Oxygen Therapy): room air (07/11/18 1518) Vital Signs (24h Range):  Temp:  [97.2 °F (36.2 °C)-98.5 °F (36.9 °C)] 97.4 °F (36.3 °C)  Pulse:  [50-78] 62  Resp:  [16-20] 18  SpO2:  [95 %-100 %] 100 %  BP: (102-130)/(50-65) 127/56     Weight: 124.6 kg (274 lb 11.1 oz) (07/11/18 0515)  Body mass index is 35.27  kg/m².  Body surface area is 2.55 meters squared.    I/O last 3 completed shifts:  In: 2405.8 [P.O.:1130; I.V.:1275.8]  Out: 5650 [Urine:850; Other:4800]    Physical Exam   Constitutional: He is oriented to person, place, and time.   Patient up in chair, more alert/lucid today, family at bed side   HENT:   Head: Normocephalic and atraumatic.   Eyes: EOM are normal. Pupils are equal, round, and reactive to light.   Neck: Normal range of motion. Neck supple. No JVD present.   Cardiovascular: Normal rate and regular rhythm.    No murmur heard.  Pulmonary/Chest: Effort normal and breath sounds normal. No respiratory distress. He has no wheezes. He has no rales.   Abdominal: Soft. He exhibits no distension. There is no tenderness.   Active bowel sounds with succussion splash     Flapping tremors bilaterally    Musculoskeletal: Normal range of motion. He exhibits edema.   Neurological: He is alert and oriented to person, place, and time. No cranial nerve deficit.   Skin: Skin is dry. Capillary refill takes less than 2 seconds. There is erythema.   Dry scally erythematous rash in the bilateral LE with 2+ pitting edema    Also pitting edema on bilateral UE    Psychiatric: He has a normal mood and affect. His behavior is normal.       Significant Labs:  CBC:   Recent Labs  Lab 07/11/18 0439   WBC 2.86*   RBC 2.54*   HGB 8.7*   HCT 26.0*   PLT 54*   *   MCH 34.3*   MCHC 33.5     CMP:   Recent Labs  Lab 07/11/18 0439   *   CALCIUM 10.0   ALBUMIN 3.1*   PROT 5.2*   *   K 4.0   CO2 21*   *   BUN 31*   CREATININE 1.5*   ALKPHOS 88   ALT 40   AST 50*   BILITOT 2.2*     All labs within the past 24 hours have been reviewed.

## 2018-07-11 NOTE — ASSESSMENT & PLAN NOTE
- Para 6/25 negative for infection   - Para 7/10 negative for infection  - holding diuretics due to dehydration/VICKY

## 2018-07-11 NOTE — PLAN OF CARE
Problem: Patient Care Overview  Goal: Plan of Care Review  Outcome: Ongoing (interventions implemented as appropriate)  POC reviewed w/ pt and family this am to promoting healthy sleep/wake cycle, wound care, promoting fluids, promoting PO intake, increasing activity, and safety.  Pt kept stimulated by family throughout the day in order to allow pt to sleep this pm.  Cream applied to bilat lower ext by pt's spouse.  Pt ambulating in verde with family.  Pt's PO intake adequate, trying to decrease serum sodium (152 this am).  Pt put on D5W @ 100 cc/hr till 2000 this pm.  Pt taking lactulose as schedule so far today, 1 BM to this point.

## 2018-07-11 NOTE — ASSESSMENT & PLAN NOTE
- VICKY initially improving with albumin infusion but with minimal intake 6/25 and 6/26.  - Bicarb 18, on Na Bicarb 1300 mg TID  - Holding diuretics (previously lasix 40mg daily) due to elevated Cr  - nephrology consulted due to hypervolemia and multiple electrolyte abnormalities (acidosis, hypernatremia)  - Cr improving with hydration

## 2018-07-11 NOTE — ASSESSMENT & PLAN NOTE
"- Dietary consulted for calorie count.  - Pt eating better accord to family.  Does take boost supplement.  - "pre-hab" regimen and supplements ordered. Dc creon due to excessive diarrhea  - Patient drinking at least 2-3 boost supplements and beneprotein daily.   - If continues to have low intake, consider starting Megace.   - c/o persistent nausea, vomiting overnight with brown emesis, KUB 7/9 with scattered bowel gas.   "

## 2018-07-11 NOTE — ASSESSMENT & PLAN NOTE
62 yo male with acute decompensated liver failure secondary to alcohol cirrhosis now with sCr trending up/with out improvement nephrology consulted for VICKY. Etiology most likely pre-renal. Also considered HRS but less likely given optimal BP and renal function labs.     -sCr at baseline 1.4-1.5, sCr improved around baseline, 1.5 from 1.8  -Renal function with out much improvement despite lasix and albumin   -U & L edema on physical exam today  -Increased stool out put 2/2 lactulose for HE  -No hypotensive episodes/blood loss/septic shock/nephrotoxind during current admit  -UA with hyaline cast, no proteinuria, has some pyuria, urine Na+ < 20, Urine Osmolarity increased (426) - more suggestive of pre-renal etiology  -Evidence of intravascular volume depletion given hypernatremia as well as increased stool out put, previously was also getting daily lasix until d/c recently  -HRS less likely- patient jameson likely volume depleted intravascularly: hypernatremia, low bicarb. Optimal BP.   -Repeat UA with 2+ proteins, > 100 RBC, UPC 1.15  -Renal US with corticomedullary thickening and left non obstructing stone  -Will get 2D Echo - normal EF/no diastolic dysfunction, IVC with > 50 collapse on sniff suggestive of hypovolemia  -Urine spun - hyaline cast   -Cr improved to 1.6 from 2.0 with Light hydration with (D5 + 2 amp HCO3 for 12 hours) yesterday  -Hypernatremic with free water deficit over 6 lt, continue D5 @ 100 cc/hr and continue to encourage po intake.   -Encourage PO intake  -will reassess in the PM

## 2018-07-11 NOTE — PROGRESS NOTES
[facility]  Nephrology  Progress Note    Patient Name: Alon Adamson  MRN: [id number]  Admission Date: 6/22/2018  Hospital Length of Stay: 19 days  Attending Provider: Elan Guerra MD   Primary Care Physician: Mckinley Vides MD  Principal Problem:Acute kidney injury superimposed on chronic kidney disease    Subjective:     HPI: Mr. Adamson is 63 yr old male with decompensated alcoholic cirrhosis, CKD III and CAD admitted here on 06/22/18 admitted for bilateral LE hypervolemia and VICKY on CKD III with cr of 2.3 baseline around 1.5. Patient has multiple hospitalization in the past secondary to decompensated liver failure. Patient is currently on transplant team. During current admission patient was getting lasix and albumin intermittently for VICKY however renal function is not getting better. Patient hospital course is complicated by hepatic encephalopathy with some improvement of mentation with lactulose. Also treated for cellulitis with 7 days of vancomycin. Concern for seizures, EEG with out evidence of seizure activities. Patient had paracentesis on current admit but no evidence for SBP, only 1 lt removed. Nephrology is consulted for worsening/not improving VICKY despite lasix/albumin.     Per chart review patient has no hypotensive episodes, BP mostly above 100's. Has not received nephrotoxins such as NSAIDs/contrast media. No sever sepsis/septic shock or acute blood loss during current admit. UA with 2+ leukocytes and Hyaline casts, no wbc/rbc cast or proteinuria. Urine Na+ < 20.    Patient has no complaints when seen today. Family at bedside states his mentation has gotten better today, more lucid. Has increased stool out put as many as 10x daily at times, averages x4 daily. Appetite and enery level is poor but seems to be improving today per wife.       Interval History: Patient more lucid today. Cr 1.5 much improved, close to baseline, will continue D5. Hypernatremia 152 today. 4.8 lt  removed from paracentesis.     Review of patient's allergies indicates:   Allergen Reactions    Nsaids (non-steroidal anti-inflammatory drug)      D/t to liver disease.    Tylenol [acetaminophen]      D/t liver disease.    Penicillins Other (See Comments)     Tolerated pip-tazo in 8/2016 without issue  Since childhood was told not to take it     Current Facility-Administered Medications   Medication Frequency    acetaminophen tablet 650 mg Q8H PRN    dextrose 5 % infusion Continuous    diphenhydrAMINE-zinc acetate 1-0.1% cream TID PRN    ergocalciferol capsule 50,000 Units Q7 Days    fluconazole tablet 200 mg Daily    heparin (porcine) injection 5,000 Units Q8H    lactulose 10 gram/15 mL solution (enema) 200 g Q6H PRN    lactulose 20 gram/30 mL solution Soln 45 g TID    lactulose 20 gram/30 mL solution Soln 45 g Q6H PRN    levETIRAcetam tablet 500 mg BID    lidocaine 5 % patch 2 patch Q24H    metoclopramide HCl injection 10 mg Q6H PRN    omega-3 acid ethyl esters capsule 2 g Daily with breakfast    ondansetron disintegrating tablet 8 mg Q8H PRN    pantoprazole EC tablet 40 mg BID    propranolol 20 mg/5 mL (4 mg/mL) solution 5 mg BID    rifAXIMin tablet 550 mg BID    sertraline tablet 50 mg Daily    sodium bicarbonate tablet 1,300 mg TID    sodium chloride 0.9% flush 3 mL PRN       Objective:     Vital Signs (Most Recent):  Temp: 97.4 °F (36.3 °C) (07/11/18 1518)  Pulse: 62 (07/11/18 1518)  Resp: 18 (07/11/18 1518)  BP: (!) 127/56 (07/11/18 1518)  SpO2: 100 % (07/11/18 1518)  O2 Device (Oxygen Therapy): room air (07/11/18 1518) Vital Signs (24h Range):  Temp:  [97.2 °F (36.2 °C)-98.5 °F (36.9 °C)] 97.4 °F (36.3 °C)  Pulse:  [50-78] 62  Resp:  [16-20] 18  SpO2:  [95 %-100 %] 100 %  BP: (102-130)/(50-65) 127/56     Weight: 124.6 kg (274 lb 11.1 oz) (07/11/18 0515)  Body mass index is 35.27 kg/m².  Body surface area is 2.55 meters squared.    I/O last 3 completed shifts:  In: 2405.8 [P.O.:1130;  I.V.:1275.8]  Out: 5650 [Urine:850; Other:4800]    Physical Exam   Constitutional: He is oriented to person, place, and time.   Patient up in chair, more alert/lucid today, family at bed side   HENT:   Head: Normocephalic and atraumatic.   Eyes: EOM are normal. Pupils are equal, round, and reactive to light.   Neck: Normal range of motion. Neck supple. No JVD present.   Cardiovascular: Normal rate and regular rhythm.    No murmur heard.  Pulmonary/Chest: Effort normal and breath sounds normal. No respiratory distress. He has no wheezes. He has no rales.   Abdominal: Soft. He exhibits no distension. There is no tenderness.   Active bowel sounds with succussion splash     Flapping tremors bilaterally    Musculoskeletal: Normal range of motion. He exhibits edema.   Neurological: He is alert and oriented to person, place, and time. No cranial nerve deficit.   Skin: Skin is dry. Capillary refill takes less than 2 seconds. There is erythema.   Dry scally erythematous rash in the bilateral LE with 2+ pitting edema    Also pitting edema on bilateral UE    Psychiatric: He has a normal mood and affect. His behavior is normal.       Significant Labs:  CBC:   Recent Labs  Lab 07/11/18  0439   WBC 2.86*   RBC 2.54*   HGB 8.7*   HCT 26.0*   PLT 54*   *   MCH 34.3*   MCHC 33.5     CMP:   Recent Labs  Lab 07/11/18  0439   *   CALCIUM 10.0   ALBUMIN 3.1*   PROT 5.2*   *   K 4.0   CO2 21*   *   BUN 31*   CREATININE 1.5*   ALKPHOS 88   ALT 40   AST 50*   BILITOT 2.2*     All labs within the past 24 hours have been reviewed.         Assessment/Plan:     * Acute kidney injury superimposed on chronic kidney disease    62 yo male with acute decompensated liver failure secondary to alcohol cirrhosis now with sCr trending up/with out improvement nephrology consulted for VICKY. Etiology most likely pre-renal. Also considered HRS but less likely given optimal BP and renal function labs.     -sCr at baseline 1.4-1.5,  sCr improved around baseline, 1.5 from 1.8  -Renal function with out much improvement despite lasix and albumin   -U & L edema on physical exam today  -Increased stool out put 2/2 lactulose for HE  -No hypotensive episodes/blood loss/septic shock/nephrotoxind during current admit  -UA with hyaline cast, no proteinuria, has some pyuria, urine Na+ < 20, Urine Osmolarity increased (426) - more suggestive of pre-renal etiology  -Evidence of intravascular volume depletion given hypernatremia as well as increased stool out put, previously was also getting daily lasix until d/c recently  -HRS less likely- patient jameson likely volume depleted intravascularly: hypernatremia, low bicarb. Optimal BP.   -Repeat UA with 2+ proteins, > 100 RBC, UPC 1.15  -Renal US with corticomedullary thickening and left non obstructing stone  -Will get 2D Echo - normal EF/no diastolic dysfunction, IVC with > 50 collapse on sniff suggestive of hypovolemia  -Urine spun - hyaline cast   -Cr improved to 1.6 from 2.0 with Light hydration with (D5 + 2 amp HCO3 for 12 hours) yesterday  -Hypernatremic with free water deficit over 6 lt, continue D5 @ 100 cc/hr and continue to encourage po intake.   -Encourage PO intake  -will reassess in the PM       Hypernatremia    -Hypernatremia, 152 today from 154  -Patient likely dehydrated with over 6 tl free water deficit from increased stool out put, less PO intake  -Some improvement with D5 and PO intake  -Continue D5@ 100 cc/hr, daily BMP          Thank you for your consult. I will follow-up with patient. Please contact us if you have any additional questions.    Rogelio Nickerson MD  Nephrology  [facility]

## 2018-07-11 NOTE — SUBJECTIVE & OBJECTIVE
Scheduled Meds:   ergocalciferol  50,000 Units Oral Q7 Days    fluconazole  200 mg Oral Daily    heparin (porcine)  5,000 Units Subcutaneous Q8H    lactulose  45 g Oral TID    levETIRAcetam  500 mg Oral BID    lidocaine  2 patch Transdermal Q24H    omega-3 acid ethyl esters  2 g Oral Daily with breakfast    pantoprazole  40 mg Oral BID    propranolol  5 mg Oral BID    rifAXImin  550 mg Oral BID    sertraline  50 mg Oral Daily    sodium bicarbonate  1,300 mg Oral TID     Continuous Infusions:   dextrose 5 % 100 mL/hr at 07/11/18 0848     PRN Meds:acetaminophen, diphenhydrAMINE-zinc acetate 1-0.1%, lactulose, lactulose, metoclopramide HCl, ondansetron, sodium chloride 0.9%    Review of Systems   Constitutional: Positive for appetite change (wife reports, patient does not much) and fatigue. Negative for activity change, chills and fever.   HENT: Negative for mouth sores, sore throat and trouble swallowing.    Eyes: Negative for visual disturbance.   Respiratory: Positive for shortness of breath (upon activity). Negative for wheezing.    Cardiovascular: Positive for leg swelling. Negative for chest pain.   Gastrointestinal: Positive for diarrhea (lactulose), nausea and vomiting. Negative for abdominal distention and abdominal pain.   Endocrine: Negative for polydipsia and polyuria.   Genitourinary: Negative for decreased urine volume and difficulty urinating.   Musculoskeletal: Positive for arthralgias (both shoulders have been hurting for last 2 weeks, right > left, improved with lidocaine patches).   Skin: Negative for rash.   Neurological: Positive for weakness. Negative for dizziness, syncope and headaches.   Hematological: Bruises/bleeds easily.   Psychiatric/Behavioral: Negative for agitation, confusion, hallucinations and sleep disturbance.     Objective:     Vital Signs (Most Recent):  Temp: 97.2 °F (36.2 °C) (07/11/18 1247)  Pulse: (!) 50 (07/11/18 1247)  Resp: 16 (07/11/18 1247)  BP: (!) 116/53  (07/11/18 1247)  SpO2: 97 % (07/11/18 1247) Vital Signs (24h Range):  Temp:  [97.2 °F (36.2 °C)-98.5 °F (36.9 °C)] 97.2 °F (36.2 °C)  Pulse:  [50-78] 50  Resp:  [16-20] 16  SpO2:  [95 %-100 %] 97 %  BP: (102-130)/(50-65) 116/53     Weight: 124.6 kg (274 lb 11.1 oz)  Body mass index is 35.27 kg/m².    Intake/Output - Last 3 Shifts       07/09 0700 - 07/10 0659 07/10 0700 - 07/11 0659 07/11 0700 - 07/12 0659    P.O. 1850 680     I.V. (mL/kg) 707.5 (5.5) 568.3 (4.6)     Total Intake(mL/kg) 2557.5 (19.8) 1248.3 (10)     Urine (mL/kg/hr) 1200 (0.4) 0 (0)     Emesis/NG output  0 (0)     Other  4800 (1.6)     Stool  0 (0)     Blood  0 (0)     Total Output 1200 4800      Net +1357.5 -3551.7             Urine Occurrence 1 x 4 x     Stool Occurrence 8 x 6 x     Emesis Occurrence  0 x           Physical Exam   Constitutional: He is oriented to person, place, and time. He appears well-developed and well-nourished.   HENT:   Head: Normocephalic and atraumatic.   Mouth/Throat: No oropharyngeal exudate.   Eyes: EOM are normal. Pupils are equal, round, and reactive to light. Scleral icterus is present.   Neck: No JVD present.   Cardiovascular: Exam reveals no gallop and no friction rub.    Murmur heard.  Pulmonary/Chest: Effort normal and breath sounds normal. No stridor. No respiratory distress. He has no wheezes. He has no rales.   Abdominal: Soft. Bowel sounds are normal. He exhibits distension (mild). He exhibits no mass.   Musculoskeletal: He exhibits edema (+2/+3 generalized edema). He exhibits no tenderness.   Lymphadenopathy:     He has no cervical adenopathy.   Neurological: He is alert and oriented to person, place, and time.   Has asterixis and baseline tremor    Skin: Skin is warm and dry. No rash noted. No erythema.   BLE with chronic venous stasis changes, crusty/flaky skin intact without wounds   Psychiatric: He has a normal mood and affect. His behavior is normal.       Laboratory:  Immunosuppressants     None         CBC:   Recent Labs  Lab 07/11/18 0439   WBC 2.86*   RBC 2.54*   HGB 8.7*   HCT 26.0*   PLT 54*   *   MCH 34.3*   MCHC 33.5     CMP:   Recent Labs  Lab 07/11/18 0439   *   CALCIUM 10.0   ALBUMIN 3.1*   PROT 5.2*   *   K 4.0   CO2 21*   *   BUN 31*   CREATININE 1.5*   ALKPHOS 88   ALT 40   AST 50*   BILITOT 2.2*     Coagulation:   Recent Labs  Lab 07/11/18 0439   INR 1.7*     Labs within the past 24 hours have been reviewed.    Diagnostic Results:  I have personally reviewed all pertinent imaging studies.

## 2018-07-12 LAB
ALBUMIN SERPL BCP-MCNC: 2.9 G/DL
ALP SERPL-CCNC: 100 U/L
ALT SERPL W/O P-5'-P-CCNC: 42 U/L
AMMONIA PLAS-SCNC: 146 UMOL/L
ANION GAP SERPL CALC-SCNC: 10 MMOL/L
ANION GAP SERPL CALC-SCNC: 8 MMOL/L
ANION GAP SERPL CALC-SCNC: 9 MMOL/L
ANISOCYTOSIS BLD QL SMEAR: SLIGHT
AST SERPL-CCNC: 54 U/L
BASOPHILS # BLD AUTO: 0.06 K/UL
BASOPHILS NFR BLD: 1.6 %
BILIRUB SERPL-MCNC: 2.2 MG/DL
BUN SERPL-MCNC: 30 MG/DL
BUN SERPL-MCNC: 31 MG/DL
BUN SERPL-MCNC: 33 MG/DL
BURR CELLS BLD QL SMEAR: ABNORMAL
CALCIUM SERPL-MCNC: 10 MG/DL
CALCIUM SERPL-MCNC: 9.6 MG/DL
CALCIUM SERPL-MCNC: 9.9 MG/DL
CHLORIDE SERPL-SCNC: 117 MMOL/L
CHLORIDE SERPL-SCNC: 120 MMOL/L
CHLORIDE SERPL-SCNC: 122 MMOL/L
CO2 SERPL-SCNC: 16 MMOL/L
CO2 SERPL-SCNC: 18 MMOL/L
CO2 SERPL-SCNC: 18 MMOL/L
CREAT SERPL-MCNC: 1.6 MG/DL
CREAT SERPL-MCNC: 1.7 MG/DL
CREAT SERPL-MCNC: 1.9 MG/DL
DACRYOCYTES BLD QL SMEAR: ABNORMAL
DIFFERENTIAL METHOD: ABNORMAL
EOSINOPHIL # BLD AUTO: 0.6 K/UL
EOSINOPHIL NFR BLD: 16 %
ERYTHROCYTE [DISTWIDTH] IN BLOOD BY AUTOMATED COUNT: 20.5 %
EST. GFR  (AFRICAN AMERICAN): 42.7 ML/MIN/1.73 M^2
EST. GFR  (AFRICAN AMERICAN): 48.9 ML/MIN/1.73 M^2
EST. GFR  (AFRICAN AMERICAN): 52.6 ML/MIN/1.73 M^2
EST. GFR  (NON AFRICAN AMERICAN): 37 ML/MIN/1.73 M^2
EST. GFR  (NON AFRICAN AMERICAN): 42.3 ML/MIN/1.73 M^2
EST. GFR  (NON AFRICAN AMERICAN): 45.5 ML/MIN/1.73 M^2
GLUCOSE SERPL-MCNC: 118 MG/DL
GLUCOSE SERPL-MCNC: 192 MG/DL
GLUCOSE SERPL-MCNC: 244 MG/DL
HCT VFR BLD AUTO: 27.2 %
HGB BLD-MCNC: 9.3 G/DL
HYPOCHROMIA BLD QL SMEAR: ABNORMAL
IMM GRANULOCYTES # BLD AUTO: 0 K/UL
IMM GRANULOCYTES NFR BLD AUTO: 0 %
INR PPP: 1.7
LACTATE SERPL-SCNC: 2.6 MMOL/L
LYMPHOCYTES # BLD AUTO: 1.2 K/UL
LYMPHOCYTES NFR BLD: 31.3 %
MAGNESIUM SERPL-MCNC: 2.4 MG/DL
MCH RBC QN AUTO: 35.4 PG
MCHC RBC AUTO-ENTMCNC: 34.2 G/DL
MCV RBC AUTO: 103 FL
MONOCYTES # BLD AUTO: 0.5 K/UL
MONOCYTES NFR BLD: 13.9 %
NEUTROPHILS # BLD AUTO: 1.4 K/UL
NEUTROPHILS NFR BLD: 37.2 %
NRBC BLD-RTO: 0 /100 WBC
OVALOCYTES BLD QL SMEAR: ABNORMAL
PHOSPHATE SERPL-MCNC: 3.1 MG/DL
PLATELET # BLD AUTO: 56 K/UL
PLATELET BLD QL SMEAR: ABNORMAL
PMV BLD AUTO: 13.5 FL
POCT GLUCOSE: 267 MG/DL (ref 70–110)
POIKILOCYTOSIS BLD QL SMEAR: SLIGHT
POLYCHROMASIA BLD QL SMEAR: ABNORMAL
POTASSIUM SERPL-SCNC: 3.7 MMOL/L
POTASSIUM SERPL-SCNC: 4.1 MMOL/L
POTASSIUM SERPL-SCNC: 4.1 MMOL/L
PROT SERPL-MCNC: 5.1 G/DL
PROTHROMBIN TIME: 16.3 SEC
RBC # BLD AUTO: 2.63 M/UL
SODIUM SERPL-SCNC: 143 MMOL/L
SODIUM SERPL-SCNC: 145 MMOL/L
SODIUM SERPL-SCNC: 150 MMOL/L
TARGETS BLD QL SMEAR: ABNORMAL
WBC # BLD AUTO: 3.74 K/UL

## 2018-07-12 PROCEDURE — 25000003 PHARM REV CODE 250: Mod: NTX | Performed by: INTERNAL MEDICINE

## 2018-07-12 PROCEDURE — 63600175 PHARM REV CODE 636 W HCPCS: Mod: NTX | Performed by: PHYSICIAN ASSISTANT

## 2018-07-12 PROCEDURE — 20600001 HC STEP DOWN PRIVATE ROOM: Mod: NTX

## 2018-07-12 PROCEDURE — 84100 ASSAY OF PHOSPHORUS: CPT | Mod: NTX

## 2018-07-12 PROCEDURE — 36415 COLL VENOUS BLD VENIPUNCTURE: CPT | Mod: NTX

## 2018-07-12 PROCEDURE — 99233 SBSQ HOSP IP/OBS HIGH 50: CPT | Mod: NTX,,, | Performed by: NURSE PRACTITIONER

## 2018-07-12 PROCEDURE — 83605 ASSAY OF LACTIC ACID: CPT | Mod: NTX

## 2018-07-12 PROCEDURE — 80048 BASIC METABOLIC PNL TOTAL CA: CPT | Mod: NTX

## 2018-07-12 PROCEDURE — 80053 COMPREHEN METABOLIC PANEL: CPT | Mod: NTX

## 2018-07-12 PROCEDURE — 87040 BLOOD CULTURE FOR BACTERIA: CPT | Mod: NTX

## 2018-07-12 PROCEDURE — 25000003 PHARM REV CODE 250: Mod: NTX | Performed by: PHYSICIAN ASSISTANT

## 2018-07-12 PROCEDURE — 82140 ASSAY OF AMMONIA: CPT | Mod: NTX

## 2018-07-12 PROCEDURE — 85025 COMPLETE CBC W/AUTO DIFF WBC: CPT | Mod: NTX

## 2018-07-12 PROCEDURE — 25000003 PHARM REV CODE 250: Mod: NTX | Performed by: NURSE PRACTITIONER

## 2018-07-12 PROCEDURE — 85610 PROTHROMBIN TIME: CPT | Mod: NTX

## 2018-07-12 PROCEDURE — 97803 MED NUTRITION INDIV SUBSEQ: CPT | Mod: NTX

## 2018-07-12 PROCEDURE — 83735 ASSAY OF MAGNESIUM: CPT | Mod: NTX

## 2018-07-12 RX ORDER — DEXTROSE MONOHYDRATE 50 MG/ML
INJECTION, SOLUTION INTRAVENOUS CONTINUOUS
Status: DISCONTINUED | OUTPATIENT
Start: 2018-07-12 | End: 2018-07-12

## 2018-07-12 RX ADMIN — HEPARIN SODIUM 5000 UNITS: 5000 INJECTION, SOLUTION INTRAVENOUS; SUBCUTANEOUS at 06:07

## 2018-07-12 RX ADMIN — ERGOCALCIFEROL 50000 UNITS: 1.25 CAPSULE ORAL at 11:07

## 2018-07-12 RX ADMIN — DEXTROSE MONOHYDRATE: 5 INJECTION, SOLUTION INTRAVENOUS at 09:07

## 2018-07-12 RX ADMIN — HEPARIN SODIUM 5000 UNITS: 5000 INJECTION, SOLUTION INTRAVENOUS; SUBCUTANEOUS at 10:07

## 2018-07-12 RX ADMIN — LACTULOSE 45 G: 20 SOLUTION ORAL at 11:07

## 2018-07-12 RX ADMIN — LEVETIRACETAM 500 MG: 500 TABLET, FILM COATED ORAL at 11:07

## 2018-07-12 RX ADMIN — SERTRALINE HYDROCHLORIDE 50 MG: 50 TABLET ORAL at 11:07

## 2018-07-12 RX ADMIN — LACTULOSE 200 G: 10 SOLUTION ORAL at 10:07

## 2018-07-12 RX ADMIN — RIFAXIMIN 550 MG: 550 TABLET ORAL at 11:07

## 2018-07-12 RX ADMIN — HEPARIN SODIUM 5000 UNITS: 5000 INJECTION, SOLUTION INTRAVENOUS; SUBCUTANEOUS at 02:07

## 2018-07-12 RX ADMIN — SODIUM BICARBONATE: 84 INJECTION, SOLUTION INTRAVENOUS at 06:07

## 2018-07-12 RX ADMIN — RIFAXIMIN 550 MG: 550 TABLET ORAL at 10:07

## 2018-07-12 RX ADMIN — LEVETIRACETAM 500 MG: 500 TABLET, FILM COATED ORAL at 10:07

## 2018-07-12 RX ADMIN — LIDOCAINE 2 PATCH: 50 PATCH TOPICAL at 11:07

## 2018-07-12 RX ADMIN — PANTOPRAZOLE SODIUM 40 MG: 40 TABLET, DELAYED RELEASE ORAL at 10:07

## 2018-07-12 RX ADMIN — PANTOPRAZOLE SODIUM 40 MG: 40 TABLET, DELAYED RELEASE ORAL at 11:07

## 2018-07-12 NOTE — ASSESSMENT & PLAN NOTE
-Hypernatremia, improved 145 today.   -Patient likely dehydrated with over 6 tl free water deficit from increased stool out put, less PO intake  -Some improvement with D5 and PO intake  -Continue D5@ 100 cc/hr, follow up with  BMP

## 2018-07-12 NOTE — PHYSICIAN QUERY
PT Name: Alon Adamson  MR #: [id number]     Physician Query Form - Diagnosis Clarification      CDS/: Laurie Nieves               Contact information: [email or web address]     This form is a permanent document in the medical record.     Query Date: July 12, 2018    By submitting this query, we are merely seeking further clarification of documentation.  Please utilize your independent clinical judgment when addressing the question(s) below.     The medical record contains the following:      Findings Supporting Clinical Information Location in Medical Record   - Ur Na: 20, likely HRS.      Etiology most likely pre-renal. Also considered HRS but less likely given optimal BP and renal function labs.      Ur Na: 20, likely HRS.   - Bicarb 18, on Na Bicarb 1300 mg TID  - Holding diuretics (previously lasix 40mg daily) due to elevated Cr  - nephrology consulted due to hypervolemia and multiple electrolyte abnormalities (acidosis, hypernatremia)    Transplant PN 7/9    Nephrology PN 7/11        Transplant PN 7/9     Please clarify if the __HRS (hepatic reanal syndrome_____ diagnosis has been:    [  ] Ruled In  [  ] Ruled In, Now Resolved  [  ] Ruled Out  [  ] Clinically insignificant  [ X ] Clinically undetermined: possibly some component of HRS involved  [  ] Other/Clarification of findings (please specify)_______________________________    Please document in your progress notes daily for the duration of treatment, until resolved, and include in your discharge summary.

## 2018-07-12 NOTE — PROGRESS NOTES
[facility]  Liver Transplant  Progress Note    Patient Name: Alon Adamson  MRN: [id number]  Admission Date: 6/22/2018  Hospital Length of Stay: 20 days  Code Status: Full Code  Primary Care Provider: Mckinley Vides MD    Subjective:     History of Present Illness:  Mr. 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  Complications of liver disease include hyperbilirubinemia, hypoalbuminemia, severe malnutrition,   hepatic encephalopathy, thrombocytopenia, splenomegaly, ascites, hypervolemia, coagulopathy, portal HTN.  Pt recently admitted for severe hepatic encephalopathy requiring intubation but has been stable from mentation standpoint post resolution of acute enceophalopathy. Mr. Adamson was seen in clinic 6/22 and was noted to have significant hypervolemia, weeping from BLE, and VICKY on CKD3 on routine labs.  His MELD increased from 22 to 26 per labs- coordinator notified and MELD updated.  Cr elevated to 2.3 from baseline of 1.4.  Also with worsening ascites.  Pt reported increased pruritis, generalized fatigue and weakness.  He presented in wheelchair.  ROS otherwise negative.      Hospital Course:  Patient is listed for liver transplant, placed on internal hold 6/25 for HE then status 7 on 6/26 due to change in prescription coverage. He was re-activated on the list 7/3, MELD 22. Pt started on IV diuretics 6/22 and continued 6/23 and 6/24.  BLE edema and ascites signif improved with diuresis and kidney function also improved. During admission, patient had worsening encephalopathy and asterixis prompting infectious work up, all blood and urine cultures obtained during admission with NGTD. Intermittently, encephalopathy severe enough to warrant lactulose enemas. CT head obtained and negative. Paracentesis 6/25 negative for peritonitis per cell count and U/A negative. Pt also with concern for BLE cellulitis- upper legs bright red with lower BLE still with dark  appearance of venous stasis. Placed on vancomycin with improvement in BLE redness 6/26. Vanc continued for 7 days (ended 7/1). His HE waxed and waned for several days requiring lactulose enemas. Pt eating minimally during period of encephalopathy requiring gentle hydration with IVF 6/26. Of note, taco placement attempted 6/26 but unable secondary to agitation upon insertion prompting self resolving nose bleed. Micro lab called 6/26 PM with blood cx + for G+C in blood- pt continued on vanc which was started 6/25. ID consulted, suspect + blood cultures contaminant. EEG obtained 6/26 as pt with h/o serizures and negative. Pt continued on home Keppra. VICKY on admission initially improved with diuresis, but Cr sakina intermittently during admission likely related to aggressive diuresis. Of note, with chest pain overnight 6/26-6/27 that resolved without intervention and pt reported as mild.  EKG NSR with PVC, troponin 0.1 in setting of hypervolemia + VICKY.  Normal dobutamine stress 1/2018.  Cardiology consulted and felt not ACS, no need for further workup other than continue to treat current risk factors for CAD with ASA (pt already on ASA and statin as outpt) + BB for portal hypertension which was started. Repeat paracentesis 7/10, 4.8L off, no SBP. Hypernatremia noted 7/9, nephrology consulted to aid in management. Hypernatremia attributed to dehydration from frequent BM's, diuretics were held, d/c'd creon due to excessive diarrhea, and patient was treated with increasing po free water and IVF. Echo 7/9 with normal EF, no wall abnormality, mild tricuspid/mitral regurg.     Interval History: lethargic and confused overnight, extra lactulose given, 5 BM's overnight. Na 150, IVF d/c'd last night, restart IVF and keep continuous, monitor BMP q 8 hr. Per family, awake all night, lethargic and confused this AM, lactulose enema given. CT head unremarkable. Ammonia 146, continue lactulose, titrate for 5 BM's/daily. Remains listed  with MELD 22, listing tenuous based on clinical condition (malnutrition and debility), plan to appeal for exception points.     Scheduled Meds:   ergocalciferol  50,000 Units Oral Q7 Days    heparin (porcine)  5,000 Units Subcutaneous Q8H    lactulose  45 g Oral TID    levETIRAcetam  500 mg Oral BID    lidocaine  2 patch Transdermal Q24H    omega-3 acid ethyl esters  2 g Oral Daily with breakfast    pantoprazole  40 mg Oral BID    rifAXImin  550 mg Oral BID    sertraline  50 mg Oral Daily     Continuous Infusions:   dextrose 5 % 100 mL/hr at 07/12/18 1030     PRN Meds:acetaminophen, diphenhydrAMINE-zinc acetate 1-0.1%, lactulose, lactulose, metoclopramide HCl, ondansetron, sodium chloride 0.9%    Review of Systems   Constitutional: Positive for appetite change (wife reports, patient does not much) and fatigue. Negative for activity change, chills and fever.   HENT: Negative for mouth sores, sore throat and trouble swallowing.    Eyes: Negative for visual disturbance.   Respiratory: Positive for shortness of breath (upon activity). Negative for wheezing.    Cardiovascular: Positive for leg swelling. Negative for chest pain.   Gastrointestinal: Positive for diarrhea (lactulose), nausea and vomiting. Negative for abdominal distention and abdominal pain.   Endocrine: Negative for polydipsia and polyuria.   Genitourinary: Negative for decreased urine volume and difficulty urinating.   Musculoskeletal: Positive for arthralgias (both shoulders have been hurting for last 2 weeks, right > left, improved with lidocaine patches).   Skin: Negative for rash.   Neurological: Positive for weakness. Negative for dizziness, syncope and headaches.   Hematological: Bruises/bleeds easily.   Psychiatric/Behavioral: Negative for agitation, confusion, hallucinations and sleep disturbance.     Objective:     Vital Signs (Most Recent):  Temp: 97.5 °F (36.4 °C) (07/12/18 1226)  Pulse: (!) 55 (07/12/18 1226)  Resp: 17 (07/12/18  1226)  BP: (!) 127/59 (07/12/18 1226)  SpO2: 98 % (07/12/18 1226) Vital Signs (24h Range):  Temp:  [97.3 °F (36.3 °C)-97.8 °F (36.6 °C)] 97.5 °F (36.4 °C)  Pulse:  [55-62] 55  Resp:  [16-20] 17  SpO2:  [98 %-100 %] 98 %  BP: ()/(48-60) 127/59     Weight: 126.5 kg (278 lb 14.1 oz)  Body mass index is 35.81 kg/m².    Intake/Output - Last 3 Shifts       07/10 0700 - 07/11 0659 07/11 0700 - 07/12 0659 07/12 0700 - 07/13 0659    P.O. 680 3490     I.V. (mL/kg) 568.3 (4.6) 1128 (8.9)     Total Intake(mL/kg) 1248.3 (10) 4618 (36.5)     Urine (mL/kg/hr) 0 (0) 1050 (0.3)     Emesis/NG output 0 (0) 0 (0)     Other 4800 (1.6) 0 (0)     Stool 0 (0) 0 (0)     Blood 0 (0) 0 (0)     Total Output 4800 1050      Net -3551.7 +3568             Urine Occurrence 4 x 2 x     Stool Occurrence 6 x 6 x     Emesis Occurrence 0 x 1 x           Physical Exam   Constitutional: He is oriented to person, place, and time. He appears well-developed and well-nourished. He appears lethargic. He is cooperative.   HENT:   Head: Normocephalic and atraumatic.   Mouth/Throat: No oropharyngeal exudate.   Eyes: EOM are normal. Pupils are equal, round, and reactive to light. Scleral icterus is present.   Neck: No JVD present.   Cardiovascular: Exam reveals no gallop and no friction rub.    Murmur heard.  Pulmonary/Chest: Effort normal and breath sounds normal. No stridor. No respiratory distress. He has no wheezes. He has no rales.   Abdominal: Soft. Bowel sounds are normal. He exhibits distension (mild). He exhibits no mass.   Musculoskeletal: He exhibits edema (+2/+3 generalized edema). He exhibits no tenderness.   Lymphadenopathy:     He has no cervical adenopathy.   Neurological: He is oriented to person, place, and time. He appears lethargic.   + asterixis and baseline tremor   Intermittently disoriented, lethargic   Skin: Skin is warm and dry. No rash noted. No erythema.   BLE with chronic venous stasis changes, crusty/flaky skin intact without  wounds   Psychiatric: He has a normal mood and affect. His speech is delayed. He is slowed.       Laboratory:  Immunosuppressants     None        CBC:   Recent Labs  Lab 07/12/18  0548   WBC 3.74*   RBC 2.63*   HGB 9.3*   HCT 27.2*   PLT 56*   *   MCH 35.4*   MCHC 34.2     CMP:   Recent Labs  Lab 07/12/18  0548   *   CALCIUM 9.9   ALBUMIN 2.9*   PROT 5.1*   *   K 4.1   CO2 18*   *   BUN 30*   CREATININE 1.6*   ALKPHOS 100   ALT 42   AST 54*   BILITOT 2.2*     Coagulation:   Recent Labs  Lab 07/12/18  0548   INR 1.7*     Labs within the past 24 hours have been reviewed.    Diagnostic Results:  I have personally reviewed all pertinent imaging studies.    Assessment/Plan:     * Acute kidney injury superimposed on chronic kidney disease    - VICKY initially improving with albumin infusion but with minimal intake 6/25 and 6/26.  - dc sodium bicarb due to hypernatremia  - Holding diuretics (previously lasix 40mg daily) due to elevated Cr  - nephrology consulted due to hypervolemia and multiple electrolyte abnormalities (acidosis, hypernatremia)  - Cr improving with hydration        Hypernatremia    - worsening hypernatremia likely related to dehydration from GI loss with lactulose, large volume para 7/10 (4.8L off)  - nephrology consulted  - Na worsened this AM, encouraging patient to drink water, d5w infusion started per neph, monitor fluid status very closely as patient easily becomes hypervolemic   - continue continuous IVF for persistent hypernatremia 2/2 GI loss  - increase oral free water        Decompensated hepatic cirrhosis    - Placed status 7 on 6/26 for prescription drug coverage change. Re-activated 7/3, MELD 22. Listing tenuous due to frailty and malnutrition. Plan to submit MELD exception points.   - dc propanolol    MELD-Na score: 20 at 7/12/2018  5:48 AM  MELD score: 20 at 7/12/2018  5:48 AM  Calculated from:  Serum Creatinine: 1.6 mg/dL at 7/12/2018  5:48 AM  Serum Sodium: 150  "mmol/L (Rounded to 137) at 7/12/2018  5:48 AM  Total Bilirubin: 2.2 mg/dL at 7/12/2018  5:48 AM  INR(ratio): 1.7 at 7/12/2018  5:48 AM  Age: 63 years        Hepatic encephalopathy    - Acute on chronic  - continue rifaximin 550 mg BID and lactulose, goal 5 BM's/day, easily becomes encephalopathic  - infx workup blood/urine/ascites neg to date.  Has been off cefepime/vanc, d/c'd 7/1.   - adjusting lactulose regimen to prevent acute HE. PRN lactulose ordered in addition to scheduled dosing.   - worsening HE 7/9, blood and urine cultures NGTD, CXR clear, para 7/10 neg SBP  - CT head 7/12 unremarkable  - Ammonia 146 today        Severe protein-calorie malnutrition    - Dietary consulted for calorie count.  - Pt eating better accord to family.  Does take boost supplement.  - "pre-hab" regimen and supplements ordered. Dc creon due to excessive diarrhea  - Patient drinking at least 2-3 boost supplements and beneprotein daily.   - If continues to have low intake, consider starting Megace.   - c/o persistent nausea, vomiting overnight with brown emesis, KUB 7/9 with scattered bowel gas.         Hypoalbuminemia due to protein-calorie malnutrition    - see severe protein-calorie malnutrition        Dermatitis associated with moisture              Physical deconditioning    - PT/OT consulted.  - encourage OOBTC for several hours each day, walking with walker with PT        Ascites due to alcoholic cirrhosis    - Para 6/25 negative for infection   - Para 7/10 negative for infection  - holding diuretics due to dehydration/VICKY        Coagulopathy    - Secondary to decompensated liver disease.        CKD (chronic kidney disease), stage III    - see acute kidney injury superimposed on CKD III        Cholestatic pruritus    - cholestyramine has previously been ineffective, given depressed mood; will initiate zoloft for mood and pruritus.        Pancytopenia    - related to decompensated liver disease        Coronary artery disease " involving native coronary artery of native heart without angina pectoris    - Continue home ASA.  - 2D Echo 7/9 normal EF (55-60%), trivial tricuspid/mitral regurg.        Anemia of chronic disease    - H&H stable.  - 1 unit PRBC transfused 6/28.         Thrombocytopenia    - Secondary to splenomegaly from portal HTN- should improve with txp.  - No s/s overt bleed.        Seizures    - Oral Keppra transitioned to IV as pt not able to swallow 6/26.  - Resumed oral Keppra since mentation improved.  - EEG 6/26 negative.        Bilateral edema of lower extremity    - completed vanc x 7 days for BLE cellulitis  - 2D Echo 7/9 normal EF (55-60%), trivial tricuspid/mitral regurg.  - holding diuretics for elevated Cr/dehydration            VTE Risk Mitigation         Ordered     heparin (porcine) injection 5,000 Units  Every 8 hours      06/22/18 1502     IP VTE HIGH RISK PATIENT  Once      06/22/18 1502          The patients clinical status was discussed at multidisplinary rounds, involving transplant surgery, transplant medicine, pharmacy, nursing, nutrition, and social work    Discharge Planning: not stable for discharge at this time.     Rissa Regalado, BHARGAVI  Liver Transplant  [facility]

## 2018-07-12 NOTE — ASSESSMENT & PLAN NOTE
64 yo male with acute decompensated liver failure secondary to alcohol cirrhosis now with sCr trending up/with out improvement nephrology consulted for VICKY. Etiology most likely pre-renal. Also considered HRS but less likely given optimal BP and renal function labs.     -sCr at baseline 1.4-1.5, sCr 1.9 today from 1.5. Follow up with BMP in the evening. Continue D5 for now.   -Renal function with out much improvement despite lasix and albumin   -U & L edema on physical exam today  -Increased stool out put 2/2 lactulose for HE  -No hypotensive episodes/blood loss/septic shock/nephrotoxind during current admit  -UA with hyaline cast, no proteinuria, has some pyuria, urine Na+ < 20, Urine Osmolarity increased (426) - more suggestive of pre-renal etiology  -Evidence of intravascular volume depletion given hypernatremia as well as increased stool out put, previously was also getting daily lasix until d/c recently  -HRS less likely- patient jameson likely volume depleted intravascularly: hypernatremia, low bicarb. Optimal BP.   -Repeat UA with 2+ proteins, > 100 RBC, UPC 1.15  -Renal US with corticomedullary thickening and left non obstructing stone  -Will get 2D Echo - normal EF/no diastolic dysfunction, IVC with > 50 collapse on sniff suggestive of hypovolemia  -Urine spun - hyaline cast   -Cr improved to 1.6 from 2.0 with Light hydration with (D5 + 2 amp HCO3 for 12 hours) yesterday  -Hypernatremic improving 145 today , continue D5 @ 100 cc/hr and continue to encourage po intake.   -Encourage PO intake  -will reassess in the PM

## 2018-07-12 NOTE — ASSESSMENT & PLAN NOTE
- worsening hypernatremia likely related to dehydration from GI loss with lactulose, large volume para 7/10 (4.8L off)  - nephrology consulted  - Na worsened this AM, encouraging patient to drink water, d5w infusion started per neph, monitor fluid status very closely as patient easily becomes hypervolemic   - continue continuous IVF for persistent hypernatremia 2/2 GI loss  - increase oral free water

## 2018-07-12 NOTE — PLAN OF CARE
Problem: Patient Care Overview  Goal: Plan of Care Review  Outcome: Ongoing (interventions implemented as appropriate)  POC reviewed w/ pt and family this am to promoting healthy sleep/wake cycle, wound care, promoting fluids, promoting PO intake, increasing activity, and safety.  Pt severely lethargic and confused this am - prn lactulose enema give this morning.  Blood cultures drawn x 2.  Outstanding for UA and urine culture - team aware.  CT head done this am.  Beta blocker d/c'd.  CXR done.  Pt has had a total of 4 BMs throughout the day, AAO x 4 - in wheelchair in hallway this afternoon. Cream applied to bilat lower ext by pt's spouse.  Pt's PO intake adequate, trying to decrease serum sodium (150 this am).  BMP Q 8 hours starting at 1400.  Pt put on D5W @ 100 cc/hr but switch to Na HCO3 gtt this evening..

## 2018-07-12 NOTE — PLAN OF CARE
Problem: Occupational Therapy Goal  Goal: Occupational Therapy Goal  Goals to be met by: 7/13/18     Patient will increase functional independence with ADLs by performing:    Grooming while standing at sink with Stand-by Assistance. - MET 7/2  Toileting from toilet with Stand-by Assistance for hygiene and clothing management.  Toilet transfer to toilet with Stand-by Assistance.MET 7/2  Upper extremity exercise program x15 reps per handout, with assistance as needed.  Pt will engage in functional mobility to simulate household distances in order to maximize functional activity tolerance required for engagement in occupations of choice with SBA           Outcome: Ongoing (interventions implemented as appropriate)  con't with plan  Anni Roman, OTR

## 2018-07-12 NOTE — PT/OT/SLP PROGRESS
Physical Therapy      Patient Name:  Alon Adamson   MRN:  [id number]    Patient not seen today secondary to pt out of room at 1010 and pt being cleaned for bowel movement at 1100.  Discussed with nsg, who reports increased pt confusion. Will follow-up with pt at the next scheduled tx session.    Danielle Monterroso, PT

## 2018-07-12 NOTE — ASSESSMENT & PLAN NOTE
- VICKY initially improving with albumin infusion but with minimal intake 6/25 and 6/26.  - dc sodium bicarb due to hypernatremia  - Holding diuretics (previously lasix 40mg daily) due to elevated Cr  - nephrology consulted due to hypervolemia and multiple electrolyte abnormalities (acidosis, hypernatremia)  - Cr improving with hydration

## 2018-07-12 NOTE — SUBJECTIVE & OBJECTIVE
Interval History: Patient very somnolent this morning. CT head unremarkable. NH3+ 146 Hypernatremia improving 145, cr 1.9 in the PM, 1.5 in the AM. Continue D5, re-assess in the PM.     Review of patient's allergies indicates:   Allergen Reactions    Nsaids (non-steroidal anti-inflammatory drug)      D/t to liver disease.    Tylenol [acetaminophen]      D/t liver disease.    Penicillins Other (See Comments)     Tolerated pip-tazo in 8/2016 without issue  Since childhood was told not to take it     Current Facility-Administered Medications   Medication Frequency    acetaminophen tablet 650 mg Q8H PRN    dextrose 5 % infusion Continuous    diphenhydrAMINE-zinc acetate 1-0.1% cream TID PRN    ergocalciferol capsule 50,000 Units Q7 Days    heparin (porcine) injection 5,000 Units Q8H    lactulose 10 gram/15 mL solution (enema) 200 g Q6H PRN    lactulose 20 gram/30 mL solution Soln 45 g TID    lactulose 20 gram/30 mL solution Soln 45 g Q6H PRN    levETIRAcetam tablet 500 mg BID    lidocaine 5 % patch 2 patch Q24H    metoclopramide HCl injection 10 mg Q6H PRN    omega-3 acid ethyl esters capsule 2 g Daily with breakfast    ondansetron disintegrating tablet 8 mg Q8H PRN    pantoprazole EC tablet 40 mg BID    rifAXIMin tablet 550 mg BID    sertraline tablet 50 mg Daily    sodium chloride 0.9% flush 3 mL PRN       Objective:     Vital Signs (Most Recent):  Temp: 97.5 °F (36.4 °C) (07/12/18 1226)  Pulse: (!) 55 (07/12/18 1226)  Resp: 17 (07/12/18 1226)  BP: (!) 127/59 (07/12/18 1226)  SpO2: 98 % (07/12/18 1226)  O2 Device (Oxygen Therapy): room air (07/12/18 1226) Vital Signs (24h Range):  Temp:  [97.3 °F (36.3 °C)-97.8 °F (36.6 °C)] 97.5 °F (36.4 °C)  Pulse:  [55-62] 55  Resp:  [16-20] 17  SpO2:  [98 %-100 %] 98 %  BP: ()/(48-60) 127/59     Weight: 126.5 kg (278 lb 14.1 oz) (07/12/18 0331)  Body mass index is 35.81 kg/m².  Body surface area is 2.57 meters squared.    I/O last 3 completed shifts:  In:  5298 [P.O.:4170; I.V.:1128]  Out: 1050 [Urine:1050]    Physical Exam   Constitutional:   Patient very somnolent this morning.    HENT:   Head: Normocephalic and atraumatic.   Eyes: EOM are normal. Pupils are equal, round, and reactive to light.   Neck: Normal range of motion. Neck supple. No JVD present.   Cardiovascular: Normal rate and regular rhythm.    No murmur heard.  Pulmonary/Chest: Effort normal and breath sounds normal. No respiratory distress. He has no wheezes. He has no rales.   Abdominal: Soft. He exhibits no distension. There is no tenderness.   Active bowel sounds with succussion splash     Flapping tremors bilaterally    Musculoskeletal: Normal range of motion. He exhibits edema.   Neurological: No cranial nerve deficit.   Skin: Skin is dry. Capillary refill takes less than 2 seconds. There is erythema.   Dry scally erythematous rash in the bilateral LE with 2+ pitting edema    Also pitting edema on bilateral UE    Psychiatric: He has a normal mood and affect. His behavior is normal.       Significant Labs:  CBC:   Recent Labs  Lab 07/12/18  0548   WBC 3.74*   RBC 2.63*   HGB 9.3*   HCT 27.2*   PLT 56*   *   MCH 35.4*   MCHC 34.2     CMP:   Recent Labs  Lab 07/12/18  0548 07/12/18  1357   * 192*   CALCIUM 9.9 10.0   ALBUMIN 2.9*  --    PROT 5.1*  --    * 145   K 4.1 4.1   CO2 18* 16*   * 120*   BUN 30* 31*   CREATININE 1.6* 1.9*   ALKPHOS 100  --    ALT 42  --    AST 54*  --    BILITOT 2.2*  --      All labs within the past 24 hours have been reviewed.

## 2018-07-12 NOTE — ASSESSMENT & PLAN NOTE
- Placed status 7 on 6/26 for prescription drug coverage change. Re-activated 7/3, MELD 22. Listing tenuous due to frailty and malnutrition. Plan to submit MELD exception points.   - dc propanolol    MELD-Na score: 20 at 7/12/2018  5:48 AM  MELD score: 20 at 7/12/2018  5:48 AM  Calculated from:  Serum Creatinine: 1.6 mg/dL at 7/12/2018  5:48 AM  Serum Sodium: 150 mmol/L (Rounded to 137) at 7/12/2018  5:48 AM  Total Bilirubin: 2.2 mg/dL at 7/12/2018  5:48 AM  INR(ratio): 1.7 at 7/12/2018  5:48 AM  Age: 63 years

## 2018-07-12 NOTE — ASSESSMENT & PLAN NOTE
- completed vanc x 7 days for BLE cellulitis  - 2D Echo 7/9 normal EF (55-60%), trivial tricuspid/mitral regurg.  - holding diuretics for elevated Cr/dehydration

## 2018-07-12 NOTE — ASSESSMENT & PLAN NOTE
- Acute on chronic  - continue rifaximin 550 mg BID and lactulose, goal 5 BM's/day, easily becomes encephalopathic  - infx workup blood/urine/ascites neg to date.  Has been off cefepime/vanc, d/c'd 7/1.   - adjusting lactulose regimen to prevent acute HE. PRN lactulose ordered in addition to scheduled dosing.   - worsening HE 7/9, blood and urine cultures NGTD, CXR clear, para 7/10 neg SBP  - CT head 7/12 unremarkable  - Ammonia 146 today

## 2018-07-12 NOTE — PROGRESS NOTES
[facility]  Nephrology  Progress Note    Patient Name: Alon Adamson  MRN: [id number]  Admission Date: 6/22/2018  Hospital Length of Stay: 20 days  Attending Provider: Elan Guerra MD   Primary Care Physician: Mckinley Vides MD  Principal Problem:Acute kidney injury superimposed on chronic kidney disease    Subjective:     HPI: Mr. Adamson is 63 yr old male with decompensated alcoholic cirrhosis, CKD III and CAD admitted here on 06/22/18 admitted for bilateral LE hypervolemia and VICKY on CKD III with cr of 2.3 baseline around 1.5. Patient has multiple hospitalization in the past secondary to decompensated liver failure. Patient is currently on transplant team. During current admission patient was getting lasix and albumin intermittently for VICKY however renal function is not getting better. Patient hospital course is complicated by hepatic encephalopathy with some improvement of mentation with lactulose. Also treated for cellulitis with 7 days of vancomycin. Concern for seizures, EEG with out evidence of seizure activities. Patient had paracentesis on current admit but no evidence for SBP, only 1 lt removed. Nephrology is consulted for worsening/not improving VICKY despite lasix/albumin.     Per chart review patient has no hypotensive episodes, BP mostly above 100's. Has not received nephrotoxins such as NSAIDs/contrast media. No sever sepsis/septic shock or acute blood loss during current admit. UA with 2+ leukocytes and Hyaline casts, no wbc/rbc cast or proteinuria. Urine Na+ < 20.    Patient has no complaints when seen today. Family at bedside states his mentation has gotten better today, more lucid. Has increased stool out put as many as 10x daily at times, averages x4 daily. Appetite and enery level is poor but seems to be improving today per wife.       Interval History: Patient very somnolent this morning. CT head unremarkable. NH3+ 146 Hypernatremia improving 145, cr 1.9 in the PM, 1.5 in  the AM. Continue D5, re-assess in the PM.     Review of patient's allergies indicates:   Allergen Reactions    Nsaids (non-steroidal anti-inflammatory drug)      D/t to liver disease.    Tylenol [acetaminophen]      D/t liver disease.    Penicillins Other (See Comments)     Tolerated pip-tazo in 8/2016 without issue  Since childhood was told not to take it     Current Facility-Administered Medications   Medication Frequency    acetaminophen tablet 650 mg Q8H PRN    dextrose 5 % infusion Continuous    diphenhydrAMINE-zinc acetate 1-0.1% cream TID PRN    ergocalciferol capsule 50,000 Units Q7 Days    heparin (porcine) injection 5,000 Units Q8H    lactulose 10 gram/15 mL solution (enema) 200 g Q6H PRN    lactulose 20 gram/30 mL solution Soln 45 g TID    lactulose 20 gram/30 mL solution Soln 45 g Q6H PRN    levETIRAcetam tablet 500 mg BID    lidocaine 5 % patch 2 patch Q24H    metoclopramide HCl injection 10 mg Q6H PRN    omega-3 acid ethyl esters capsule 2 g Daily with breakfast    ondansetron disintegrating tablet 8 mg Q8H PRN    pantoprazole EC tablet 40 mg BID    rifAXIMin tablet 550 mg BID    sertraline tablet 50 mg Daily    sodium chloride 0.9% flush 3 mL PRN       Objective:     Vital Signs (Most Recent):  Temp: 97.5 °F (36.4 °C) (07/12/18 1226)  Pulse: (!) 55 (07/12/18 1226)  Resp: 17 (07/12/18 1226)  BP: (!) 127/59 (07/12/18 1226)  SpO2: 98 % (07/12/18 1226)  O2 Device (Oxygen Therapy): room air (07/12/18 1226) Vital Signs (24h Range):  Temp:  [97.3 °F (36.3 °C)-97.8 °F (36.6 °C)] 97.5 °F (36.4 °C)  Pulse:  [55-62] 55  Resp:  [16-20] 17  SpO2:  [98 %-100 %] 98 %  BP: ()/(48-60) 127/59     Weight: 126.5 kg (278 lb 14.1 oz) (07/12/18 0331)  Body mass index is 35.81 kg/m².  Body surface area is 2.57 meters squared.    I/O last 3 completed shifts:  In: 5298 [P.O.:4170; I.V.:1128]  Out: 1050 [Urine:1050]    Physical Exam   Constitutional:   Patient very somnolent this morning.    HENT:    Head: Normocephalic and atraumatic.   Eyes: EOM are normal. Pupils are equal, round, and reactive to light.   Neck: Normal range of motion. Neck supple. No JVD present.   Cardiovascular: Normal rate and regular rhythm.    No murmur heard.  Pulmonary/Chest: Effort normal and breath sounds normal. No respiratory distress. He has no wheezes. He has no rales.   Abdominal: Soft. He exhibits no distension. There is no tenderness.   Active bowel sounds with succussion splash     Flapping tremors bilaterally    Musculoskeletal: Normal range of motion. He exhibits edema.   Neurological: No cranial nerve deficit.   Skin: Skin is dry. Capillary refill takes less than 2 seconds. There is erythema.   Dry scally erythematous rash in the bilateral LE with 2+ pitting edema    Also pitting edema on bilateral UE    Psychiatric: He has a normal mood and affect. His behavior is normal.       Significant Labs:  CBC:   Recent Labs  Lab 07/12/18  0548   WBC 3.74*   RBC 2.63*   HGB 9.3*   HCT 27.2*   PLT 56*   *   MCH 35.4*   MCHC 34.2     CMP:   Recent Labs  Lab 07/12/18  0548 07/12/18  1357   * 192*   CALCIUM 9.9 10.0   ALBUMIN 2.9*  --    PROT 5.1*  --    * 145   K 4.1 4.1   CO2 18* 16*   * 120*   BUN 30* 31*   CREATININE 1.6* 1.9*   ALKPHOS 100  --    ALT 42  --    AST 54*  --    BILITOT 2.2*  --      All labs within the past 24 hours have been reviewed.         Assessment/Plan:     * Acute kidney injury superimposed on chronic kidney disease    62 yo male with acute decompensated liver failure secondary to alcohol cirrhosis now with sCr trending up/with out improvement nephrology consulted for VICKY. Etiology most likely pre-renal. Also considered HRS but less likely given optimal BP and renal function labs.     -sCr at baseline 1.4-1.5, sCr 1.9 today from 1.5. Follow up with BMP in the evening. Continue D5 for now.   -Renal function with out much improvement despite lasix and albumin   -U & L edema on  physical exam today  -Increased stool out put 2/2 lactulose for HE  -No hypotensive episodes/blood loss/septic shock/nephrotoxind during current admit  -UA with hyaline cast, no proteinuria, has some pyuria, urine Na+ < 20, Urine Osmolarity increased (426) - more suggestive of pre-renal etiology  -Evidence of intravascular volume depletion given hypernatremia as well as increased stool out put, previously was also getting daily lasix until d/c recently  -HRS less likely- patient jameson likely volume depleted intravascularly: hypernatremia, low bicarb. Optimal BP.   -Repeat UA with 2+ proteins, > 100 RBC, UPC 1.15  -Renal US with corticomedullary thickening and left non obstructing stone  -Will get 2D Echo - normal EF/no diastolic dysfunction, IVC with > 50 collapse on sniff suggestive of hypovolemia  -Urine spun - hyaline cast   -Cr improved to 1.6 from 2.0 with Light hydration with (D5 + 2 amp HCO3 for 12 hours) yesterday  -Hypernatremic improving 145 today , continue D5 @ 100 cc/hr and continue to encourage po intake.   -Encourage PO intake  -will reassess in the PM         Hypernatremia    -Hypernatremia, improved 145 today.   -Patient likely dehydrated with over 6 tl free water deficit from increased stool out put, less PO intake  -Some improvement with D5 and PO intake  -Continue D5@ 100 cc/hr, follow up with  BMP            Thank you for your consult. I will follow-up with patient. Please contact us if you have any additional questions.    Rogelio Nickerson MD  Nephrology  [facility]

## 2018-07-12 NOTE — PROGRESS NOTES
" [facility]  Adult Nutrition  Progress Note     SUMMARY       Recommendations    Recommendation/Intervention:   1. Recommend SLP swallow evaluation to determine appropriate diet texture, continue with low Na restriction   2. Continue beneprotein tid   3. Recommend to switch from Novasource renal to Boost Glucose Control tid (K and Phos wnl)    Goals:   1. Meet >85% EEN and EPN  2. Prevent dry wt loss >2% per week   3. Promote nutrition related labs wnl    Nutrition Goal Status: progressing towards goal  Communication of RD Recs:  (POC)    Comments: Pt was not answering questions at time of visit, information obtained from family at bedside. They report pt with poor intake due to being a picky eater. They state he has some difficulty swallowing thin liquids. No N/V/D/C noted. Pt has been taking beneprotein per family.    Reason for Assessment    Reason for Assessment: RD follow-up  Diagnosis:  (Acute kidney injury superimposed on chronic kidney disease )  Relevant Medical History: DM, Etoh abuse, Cirrhosis, HTN  Interdisciplinary Rounds: did not attend  Nutrition Discharge Planning: Adequate PO intake     Nutrition Risk Screen    Nutrition Risk Screen: no indicators present    Nutrition/Diet History    Patient Reported Diet/Restrictions/Preferences: low salt  Food Preferences: No coffee, no tea, likes Jell-O  Do you have any cultural, spiritual, Jehovah's witness conflicts, given your current situation?: none  Food Allergies: NKFA  Factors Affecting Nutritional Intake: decreased appetite, nausea/vomiting    Anthropometrics    Temp: 97.5 °F (36.4 °C)  Height: 6' 2" (188 cm)  Height (inches): 74 in  Weight Method: Bed Scale  Weight: 126.5 kg (278 lb 14.1 oz)  Weight (lb): 278.88 lb  Ideal Body Weight (IBW), Male: 190 lb  % Ideal Body Weight, Male (lb): 146.78 lb  BMI (Calculated): 35.9  BMI Grade: 35 - 39.9 - obesity - grade II     Lab/Procedures/Meds    Labs: Reviewed  Lab Results   Component Value Date    "  07/12/2018    K 4.1 07/12/2018     (H) 07/12/2018    CO2 16 (L) 07/12/2018    BUN 31 (H) 07/12/2018    CREATININE 1.9 (H) 07/12/2018    CALCIUM 10.0 07/12/2018    PHOS 3.1 07/12/2018    MG 2.4 07/12/2018    ESTGFRAFRICA 42.7 (A) 07/12/2018    EGFRNONAA 37.0 (A) 07/12/2018    ALBUMIN 2.9 (L) 07/12/2018     Lab Results   Component Value Date    ALT 42 07/12/2018    AST 54 (H) 07/12/2018    ALKPHOS 100 07/12/2018     Lab Results   Component Value Date    HGBA1C 5.7 (H) 06/09/2018     Meds: Reviewed  Scheduled Meds:   ergocalciferol  50,000 Units Oral Q7 Days    heparin (porcine)  5,000 Units Subcutaneous Q8H    lactulose  45 g Oral TID    levETIRAcetam  500 mg Oral BID    lidocaine  2 patch Transdermal Q24H    omega-3 acid ethyl esters  2 g Oral Daily with breakfast    pantoprazole  40 mg Oral BID    rifAXImin  550 mg Oral BID    sertraline  50 mg Oral Daily     Continuous Infusions:   dextrose 5 % 100 mL/hr at 07/12/18 1030     Physical Findings/Assessment    Overall Physical Appearance: edematous, obese  Oral/Mouth Cavity: tooth/teeth missing  Skin: edema    Estimated/Assessed Needs    Weight Used For Calorie Calculations: 126.5 kg (278 lb 14.1 oz)  Energy Calorie Requirements (kcal): 2560  Energy Need Method: [place] (stress factor 1.2)  Protein Requirements: 130-147 gm/d (1.5-1.7 gm/kg IBW)  Weight Used For Protein Calculations: 86.2 kg (190 lb) (IBW)  Fluid Requirements (mL): 2560 (or per team)  Fluid Need Method: RDA Method  RDA Method (mL): 2560     Nutrition Prescription Ordered    Current Diet Order: low Na  Oral Nutrition Supplement: Novasource Renal TID, beneprotein    Evaluation of Received Nutrient/Fluid Intake in last 24h    Intake/Output Summary (Last 24 hours) at 07/12/18 1540  Last data filed at 07/12/18 0340   Gross per 24 hour   Intake             2008 ml   Output              300 ml   Net             1708 ml     % Intake of Estimated Energy Needs: 25 - 50 %  % Meal  Intake: 25 - 50 %    Nutrition Risk    Level of Risk/Frequency of Follow-up: low     Assessment and Plan    Nutrition Problem  Inadequate oral intake  Related to (etiology):   Decreased appetite  Signs and Symptoms (as evidenced by):   Pt consuming <50% of most meals, reliance on ONS  Nutrition Diagnosis Status:   New    Monitor and Evaluation    Food and Nutrient Intake: energy intake, food and beverage intake, enteral nutrition intake  Food and Nutrient Adminstration: diet order, enteral and parenteral nutrition administration  Knowledge/Beliefs/Attitudes: food and nutrition knowledge/skill  Physical Activity and Function: nutrition-related ADLs and IADLs  Anthropometric Measurements: weight, weight change  Biochemical Data, Medical Tests and Procedures:  (All labs)  Nutrition-Focused Physical Findings: overall appearance, skin     Nutrition Follow-Up    RD Follow-up?: Yes

## 2018-07-12 NOTE — PT/OT/SLP PROGRESS
Occupational Therapy   Treatment    Name: Alon Adamson  MRN: [id number]  Admitting Diagnosis:  Acute kidney injury superimposed on chronic kidney disease       Recommendations:     Discharge Recommendations: nursing facility, skilled  Discharge Equipment Recommendations:  walker, rolling  Barriers to discharge:  None    Subjective     Communicated with: nsg prior to session. Cc with PT  Pain/Comfort:  · Pain Rating 1: 0/10    Patients cultural, spiritual, Baptism conflicts given the current situation: none    Objective:     Patient found with: peripheral IV, telemetry    General Precautions: Standard, fall   Orthopedic Precautions:N/A   Braces:       Occupational Performance:    Bed Mobility:    · Sup to sit with SBA, cues     Functional Mobility/Transfers:  · Patient completed Sit <> Stand Transfer with minimum assistance  with  rolling walker from bed, CGA from BSC  · Functional Mobility: 4 steps and 6 steps from bed to BSC and back to bed with CGA  · Up EOB and on BSC x ~20-25 min    Activities of Daily Living:  · toileting total assist standing for hygiene  · UE dress with gown with mod assist  · Socks total assist    Patient left supine with call button in reach and son present    [facility] 6 Click:  [facility] Total Score: 14    Treatment & Education:  Performed above activity, educated on BUE AROM exercises, safety with mobility.  Education:    Assessment:     Alon Adamson is a 63 y.o. male with a medical diagnosis of Acute kidney injury superimposed on chronic kidney disease.  He presents with decreased cognition, increased processing and decreased motor planning, decreased mobility this date.  Performance deficits affecting function are weakness, impaired endurance, impaired self care skills, impaired functional mobilty, gait instability, impaired cognition, decreased lower extremity function, decreased upper extremity function, impaired cardiopulmonary response to activity.      Rehab Prognosis:   fair; patient would benefit from acute skilled OT services to address these deficits and reach maximum level of function.       Plan:     Patient to be seen 3 x/week to address the above listed problems via self-care/home management, therapeutic exercises, therapeutic activities  · Plan of Care Expires: 07/28/18  · Plan of Care Reviewed with: patient, vilma    This Plan of care has been discussed with the patient who was involved in its development and understands and is in agreement with the identified goals and treatment plan    GOALS:    Occupational Therapy Goals        Problem: Occupational Therapy Goal    Goal Priority Disciplines Outcome Interventions   Occupational Therapy Goal     OT, PT/OT Ongoing (interventions implemented as appropriate)    Description:  Goals to be met by: 7/13/18     Patient will increase functional independence with ADLs by performing:    Grooming while standing at sink with Stand-by Assistance. - MET 7/2  Toileting from toilet with Stand-by Assistance for hygiene and clothing management.  Toilet transfer to toilet with Stand-by Assistance.MET 7/2  Upper extremity exercise program x15 reps per handout, with assistance as needed.  Pt will engage in functional mobility to simulate household distances in order to maximize functional activity tolerance required for engagement in occupations of choice with SBA                            Time Tracking:     OT Date of Treatment: 07/11/18  OT Start Time: 1417  OT Stop Time: 1459  OT Total Time (min): 42 min    Billable Minutes:Self Care/Home Management 15 min  Therapeutic Activity 15 min    Anni Roman OT  7/12/2018

## 2018-07-12 NOTE — SUBJECTIVE & OBJECTIVE
Scheduled Meds:   ergocalciferol  50,000 Units Oral Q7 Days    heparin (porcine)  5,000 Units Subcutaneous Q8H    lactulose  45 g Oral TID    levETIRAcetam  500 mg Oral BID    lidocaine  2 patch Transdermal Q24H    omega-3 acid ethyl esters  2 g Oral Daily with breakfast    pantoprazole  40 mg Oral BID    rifAXImin  550 mg Oral BID    sertraline  50 mg Oral Daily     Continuous Infusions:   dextrose 5 % 100 mL/hr at 07/12/18 1030     PRN Meds:acetaminophen, diphenhydrAMINE-zinc acetate 1-0.1%, lactulose, lactulose, metoclopramide HCl, ondansetron, sodium chloride 0.9%    Review of Systems   Constitutional: Positive for appetite change (wife reports, patient does not much) and fatigue. Negative for activity change, chills and fever.   HENT: Negative for mouth sores, sore throat and trouble swallowing.    Eyes: Negative for visual disturbance.   Respiratory: Positive for shortness of breath (upon activity). Negative for wheezing.    Cardiovascular: Positive for leg swelling. Negative for chest pain.   Gastrointestinal: Positive for diarrhea (lactulose), nausea and vomiting. Negative for abdominal distention and abdominal pain.   Endocrine: Negative for polydipsia and polyuria.   Genitourinary: Negative for decreased urine volume and difficulty urinating.   Musculoskeletal: Positive for arthralgias (both shoulders have been hurting for last 2 weeks, right > left, improved with lidocaine patches).   Skin: Negative for rash.   Neurological: Positive for weakness. Negative for dizziness, syncope and headaches.   Hematological: Bruises/bleeds easily.   Psychiatric/Behavioral: Negative for agitation, confusion, hallucinations and sleep disturbance.     Objective:     Vital Signs (Most Recent):  Temp: 97.5 °F (36.4 °C) (07/12/18 1226)  Pulse: (!) 55 (07/12/18 1226)  Resp: 17 (07/12/18 1226)  BP: (!) 127/59 (07/12/18 1226)  SpO2: 98 % (07/12/18 1226) Vital Signs (24h Range):  Temp:  [97.3 °F (36.3 °C)-97.8 °F (36.6  °C)] 97.5 °F (36.4 °C)  Pulse:  [55-62] 55  Resp:  [16-20] 17  SpO2:  [98 %-100 %] 98 %  BP: ()/(48-60) 127/59     Weight: 126.5 kg (278 lb 14.1 oz)  Body mass index is 35.81 kg/m².    Intake/Output - Last 3 Shifts       07/10 0700 - 07/11 0659 07/11 0700 - 07/12 0659 07/12 0700 - 07/13 0659    P.O. 680 3490     I.V. (mL/kg) 568.3 (4.6) 1128 (8.9)     Total Intake(mL/kg) 1248.3 (10) 4618 (36.5)     Urine (mL/kg/hr) 0 (0) 1050 (0.3)     Emesis/NG output 0 (0) 0 (0)     Other 4800 (1.6) 0 (0)     Stool 0 (0) 0 (0)     Blood 0 (0) 0 (0)     Total Output 4800 1050      Net -3551.7 +3568             Urine Occurrence 4 x 2 x     Stool Occurrence 6 x 6 x     Emesis Occurrence 0 x 1 x           Physical Exam   Constitutional: He is oriented to person, place, and time. He appears well-developed and well-nourished. He appears lethargic. He is cooperative.   HENT:   Head: Normocephalic and atraumatic.   Mouth/Throat: No oropharyngeal exudate.   Eyes: EOM are normal. Pupils are equal, round, and reactive to light. Scleral icterus is present.   Neck: No JVD present.   Cardiovascular: Exam reveals no gallop and no friction rub.    Murmur heard.  Pulmonary/Chest: Effort normal and breath sounds normal. No stridor. No respiratory distress. He has no wheezes. He has no rales.   Abdominal: Soft. Bowel sounds are normal. He exhibits distension (mild). He exhibits no mass.   Musculoskeletal: He exhibits edema (+2/+3 generalized edema). He exhibits no tenderness.   Lymphadenopathy:     He has no cervical adenopathy.   Neurological: He is oriented to person, place, and time. He appears lethargic.   + asterixis and baseline tremor   Intermittently disoriented, lethargic   Skin: Skin is warm and dry. No rash noted. No erythema.   BLE with chronic venous stasis changes, crusty/flaky skin intact without wounds   Psychiatric: He has a normal mood and affect. His speech is delayed. He is slowed.       Laboratory:  Immunosuppressants      None        CBC:   Recent Labs  Lab 07/12/18  0548   WBC 3.74*   RBC 2.63*   HGB 9.3*   HCT 27.2*   PLT 56*   *   MCH 35.4*   MCHC 34.2     CMP:   Recent Labs  Lab 07/12/18  0548   *   CALCIUM 9.9   ALBUMIN 2.9*   PROT 5.1*   *   K 4.1   CO2 18*   *   BUN 30*   CREATININE 1.6*   ALKPHOS 100   ALT 42   AST 54*   BILITOT 2.2*     Coagulation:   Recent Labs  Lab 07/12/18  0548   INR 1.7*     Labs within the past 24 hours have been reviewed.    Diagnostic Results:  I have personally reviewed all pertinent imaging studies.

## 2018-07-12 NOTE — PT/OT/SLP PROGRESS
Occupational Therapy      Patient Name:  Alon Adamson   MRN:  [id number]    Patient not seen today secondary to Nursing care (Pt w/ increased lethargy and Rn requestiing to hold at this time). Writing therapist attempted pt in AM, but per RN pt w/ increased lethargy and requesting to hold at this time. OT unable to return in PM. Will follow-up as scheduled.    Karthik Green, OT  7/12/2018

## 2018-07-12 NOTE — NURSING
Administered additional PRN PO dose of lactulose to patient.  Pt became nauseated shortly after and had 1 emesis occurrence.  Pt noted to be lethargic, only able to state name and .  Before administering lactulose enema pt had 1 large BM.  Currently more awake and alert.  Enema lactulose was not administered.  Will continue to monitor.

## 2018-07-12 NOTE — PLAN OF CARE
Problem: Patient Care Overview  Goal: Plan of Care Review  Outcome: Ongoing (interventions implemented as appropriate)  Recommendation/Intervention:   1. Recommend SLP swallow evaluation to determine appropriate diet texture, continue with low Na restriction   2. Continue beneprotein tid   3. Recommend to switch from Novasource renal to Boost Glucose Control tid (K and Phos wnl)     Goals:   1. Meet >85% EEN and EPN  2. Prevent dry wt loss >2% per week   3. Promote nutrition related labs wnl

## 2018-07-12 NOTE — PLAN OF CARE
Problem: Patient Care Overview  Goal: Plan of Care Review  Outcome: Ongoing (interventions implemented as appropriate)  Pt awake and alert, unable to state situation correctly. Pt slow to respond to questions. Pt noted to be more lethargic than usual per family.  Incontinent x2.  350cc measured red, tea colored urine. 5 watery BM overnight. VSS, afebrile.  Mother and son remain at pt bedside.  Fall risk precautions initiated.  Pt in lowest bed position setting, lighting adjusted, pt to wear nonskid socks when ambulating, side rails up x2.  Pt remain free from falls during shift.  Call light within reach.  Will continue to monitor.

## 2018-07-12 NOTE — NURSING
Notified primary team of pt's lethargy and confusion.  PRN lactulose enema to be administered.  Team to assess pt at bedside.

## 2018-07-13 PROBLEM — E87.0 HYPERNATREMIA: Status: RESOLVED | Noted: 2018-07-10 | Resolved: 2018-07-13

## 2018-07-13 PROBLEM — K74.60 LIVER CIRRHOSIS: Status: ACTIVE | Noted: 2018-07-13

## 2018-07-13 LAB
ALBUMIN SERPL BCP-MCNC: 2.7 G/DL
ALLENS TEST: ABNORMAL
ALP SERPL-CCNC: 107 U/L
ALT SERPL W/O P-5'-P-CCNC: 39 U/L
AMMONIA PLAS-SCNC: 162 UMOL/L
ANION GAP SERPL CALC-SCNC: 8 MMOL/L
ANION GAP SERPL CALC-SCNC: 9 MMOL/L
AST SERPL-CCNC: 50 U/L
BACTERIA SPEC AEROBE CULT: NO GROWTH
BASOPHILS # BLD AUTO: 0.04 K/UL
BASOPHILS NFR BLD: 1.3 %
BILIRUB SERPL-MCNC: 1.9 MG/DL
BUN SERPL-MCNC: 33 MG/DL
BUN SERPL-MCNC: 34 MG/DL
CALCIUM SERPL-MCNC: 9.4 MG/DL
CALCIUM SERPL-MCNC: 9.4 MG/DL
CHLORIDE SERPL-SCNC: 119 MMOL/L
CHLORIDE SERPL-SCNC: 119 MMOL/L
CO2 SERPL-SCNC: 17 MMOL/L
CO2 SERPL-SCNC: 17 MMOL/L
CREAT SERPL-MCNC: 1.7 MG/DL
CREAT SERPL-MCNC: 1.8 MG/DL
DELSYS: ABNORMAL
DIFFERENTIAL METHOD: ABNORMAL
EOSINOPHIL # BLD AUTO: 0.4 K/UL
EOSINOPHIL NFR BLD: 12.7 %
ERYTHROCYTE [DISTWIDTH] IN BLOOD BY AUTOMATED COUNT: 20.5 %
EST. GFR  (AFRICAN AMERICAN): 45.6 ML/MIN/1.73 M^2
EST. GFR  (AFRICAN AMERICAN): 48.9 ML/MIN/1.73 M^2
EST. GFR  (NON AFRICAN AMERICAN): 39.5 ML/MIN/1.73 M^2
EST. GFR  (NON AFRICAN AMERICAN): 42.3 ML/MIN/1.73 M^2
GLUCOSE SERPL-MCNC: 168 MG/DL
GLUCOSE SERPL-MCNC: 169 MG/DL
HCO3 UR-SCNC: 20.9 MMOL/L (ref 24–28)
HCT VFR BLD AUTO: 25 %
HGB BLD-MCNC: 8 G/DL
IMM GRANULOCYTES # BLD AUTO: 0 K/UL
IMM GRANULOCYTES NFR BLD AUTO: 0 %
INR PPP: 1.7
LYMPHOCYTES # BLD AUTO: 1 K/UL
LYMPHOCYTES NFR BLD: 32.3 %
MAGNESIUM SERPL-MCNC: 2.5 MG/DL
MCH RBC QN AUTO: 33.8 PG
MCHC RBC AUTO-ENTMCNC: 32 G/DL
MCV RBC AUTO: 106 FL
MONOCYTES # BLD AUTO: 0.4 K/UL
MONOCYTES NFR BLD: 13.3 %
NEUTROPHILS # BLD AUTO: 1.3 K/UL
NEUTROPHILS NFR BLD: 40.4 %
NRBC BLD-RTO: 0 /100 WBC
PCO2 BLDA: 28.8 MMHG (ref 35–45)
PH SMN: 7.47 [PH] (ref 7.35–7.45)
PHOSPHATE SERPL-MCNC: 3.8 MG/DL
PLATELET # BLD AUTO: 45 K/UL
PMV BLD AUTO: 13.2 FL
PO2 BLDA: 119 MMHG (ref 80–100)
POC BE: -3 MMOL/L
POC SATURATED O2: 99 % (ref 95–100)
POC TCO2: 22 MMOL/L (ref 23–27)
POTASSIUM SERPL-SCNC: 3.8 MMOL/L
POTASSIUM SERPL-SCNC: 3.8 MMOL/L
PROT SERPL-MCNC: 4.8 G/DL
PROTHROMBIN TIME: 16.5 SEC
RBC # BLD AUTO: 2.37 M/UL
SAMPLE: ABNORMAL
SITE: ABNORMAL
SODIUM SERPL-SCNC: 144 MMOL/L
SODIUM SERPL-SCNC: 145 MMOL/L
WBC # BLD AUTO: 3.16 K/UL

## 2018-07-13 PROCEDURE — 94761 N-INVAS EAR/PLS OXIMETRY MLT: CPT | Mod: NTX

## 2018-07-13 PROCEDURE — 25000003 PHARM REV CODE 250: Mod: NTX | Performed by: NURSE PRACTITIONER

## 2018-07-13 PROCEDURE — 85610 PROTHROMBIN TIME: CPT | Mod: NTX

## 2018-07-13 PROCEDURE — 82140 ASSAY OF AMMONIA: CPT | Mod: NTX

## 2018-07-13 PROCEDURE — 84100 ASSAY OF PHOSPHORUS: CPT | Mod: NTX

## 2018-07-13 PROCEDURE — 87040 BLOOD CULTURE FOR BACTERIA: CPT | Mod: 59,NTX

## 2018-07-13 PROCEDURE — 36415 COLL VENOUS BLD VENIPUNCTURE: CPT | Mod: NTX

## 2018-07-13 PROCEDURE — 87086 URINE CULTURE/COLONY COUNT: CPT | Mod: NTX

## 2018-07-13 PROCEDURE — 63600175 PHARM REV CODE 636 W HCPCS: Mod: NTX | Performed by: STUDENT IN AN ORGANIZED HEALTH CARE EDUCATION/TRAINING PROGRAM

## 2018-07-13 PROCEDURE — 82803 BLOOD GASES ANY COMBINATION: CPT | Mod: NTX

## 2018-07-13 PROCEDURE — 36600 WITHDRAWAL OF ARTERIAL BLOOD: CPT | Mod: NTX

## 2018-07-13 PROCEDURE — 80053 COMPREHEN METABOLIC PANEL: CPT | Mod: NTX

## 2018-07-13 PROCEDURE — 20000000 HC ICU ROOM: Mod: NTX

## 2018-07-13 PROCEDURE — 63600175 PHARM REV CODE 636 W HCPCS: Mod: JG,NTX | Performed by: STUDENT IN AN ORGANIZED HEALTH CARE EDUCATION/TRAINING PROGRAM

## 2018-07-13 PROCEDURE — 80048 BASIC METABOLIC PNL TOTAL CA: CPT | Mod: NTX

## 2018-07-13 PROCEDURE — 83735 ASSAY OF MAGNESIUM: CPT | Mod: NTX

## 2018-07-13 PROCEDURE — 63600175 PHARM REV CODE 636 W HCPCS: Mod: NTX | Performed by: PHYSICIAN ASSISTANT

## 2018-07-13 PROCEDURE — P9045 ALBUMIN (HUMAN), 5%, 250 ML: HCPCS | Mod: JG,NTX | Performed by: STUDENT IN AN ORGANIZED HEALTH CARE EDUCATION/TRAINING PROGRAM

## 2018-07-13 PROCEDURE — 85025 COMPLETE CBC W/AUTO DIFF WBC: CPT | Mod: NTX

## 2018-07-13 PROCEDURE — 99900035 HC TECH TIME PER 15 MIN (STAT): Mod: NTX

## 2018-07-13 RX ORDER — FLUCONAZOLE 2 MG/ML
400 INJECTION, SOLUTION INTRAVENOUS ONCE
Status: COMPLETED | OUTPATIENT
Start: 2018-07-13 | End: 2018-07-13

## 2018-07-13 RX ORDER — ALBUMIN HUMAN 50 G/1000ML
25 SOLUTION INTRAVENOUS ONCE
Status: COMPLETED | OUTPATIENT
Start: 2018-07-13 | End: 2018-07-13

## 2018-07-13 RX ORDER — LACTULOSE 10 G/15ML
200 SOLUTION ORAL; RECTAL ONCE
Status: DISCONTINUED | OUTPATIENT
Start: 2018-07-13 | End: 2018-07-15

## 2018-07-13 RX ORDER — CEFEPIME HYDROCHLORIDE 2 G/1
2 INJECTION, POWDER, FOR SOLUTION INTRAVENOUS
Status: DISCONTINUED | OUTPATIENT
Start: 2018-07-13 | End: 2018-07-17

## 2018-07-13 RX ADMIN — LIDOCAINE 2 PATCH: 50 PATCH TOPICAL at 11:07

## 2018-07-13 RX ADMIN — LACTULOSE 200 G: 10 SOLUTION ORAL at 04:07

## 2018-07-13 RX ADMIN — CEFEPIME 2 G: 2 INJECTION, POWDER, FOR SOLUTION INTRAVENOUS at 07:07

## 2018-07-13 RX ADMIN — ALBUMIN HUMAN 25 G: 0.05 INJECTION, SOLUTION INTRAVENOUS at 06:07

## 2018-07-13 RX ADMIN — HEPARIN SODIUM 5000 UNITS: 5000 INJECTION, SOLUTION INTRAVENOUS; SUBCUTANEOUS at 10:07

## 2018-07-13 RX ADMIN — FLUCONAZOLE 400 MG: 2 INJECTION, SOLUTION INTRAVENOUS at 07:07

## 2018-07-13 RX ADMIN — LACTULOSE 200 G: 10 SOLUTION ORAL at 05:07

## 2018-07-13 RX ADMIN — CEFEPIME 2 G: 2 INJECTION, POWDER, FOR SOLUTION INTRAVENOUS at 08:07

## 2018-07-13 RX ADMIN — HEPARIN SODIUM 5000 UNITS: 5000 INJECTION, SOLUTION INTRAVENOUS; SUBCUTANEOUS at 01:07

## 2018-07-13 RX ADMIN — SODIUM BICARBONATE: 84 INJECTION, SOLUTION INTRAVENOUS at 05:07

## 2018-07-13 RX ADMIN — SODIUM BICARBONATE: 84 INJECTION, SOLUTION INTRAVENOUS at 04:07

## 2018-07-13 RX ADMIN — VANCOMYCIN HYDROCHLORIDE 750 MG: 750 INJECTION, POWDER, LYOPHILIZED, FOR SOLUTION INTRAVENOUS at 09:07

## 2018-07-13 RX ADMIN — LACTULOSE 200 G: 10 SOLUTION ORAL at 10:07

## 2018-07-13 NOTE — PLAN OF CARE
Problem: Patient Care Overview  Goal: Plan of Care Review  Outcome: Ongoing (interventions implemented as appropriate)  Patient and patient's family updated on plan of care. No acute events during shift once admitted to SICU. VSS. Patient opens eyes to pain, withdraws to pain to all 4 extremities, and moves spontaneously. Patient is starting to wake up a little more, but still does not follow commands. Lactulose enemas given to decrease ammonia level and get patient to wake up more. Urine output ranges from 25-35ml/hr and is red tinged, but starting to clear up. All questions and concerns acknowledged and answered. See flow sheet for full assessment details. Will continue to monitor patient closely.

## 2018-07-13 NOTE — PT/OT/SLP PROGRESS
Occupational Therapy      Patient Name:  Alon Adamson   MRN:  [id number]    Attempted to see patient for OT treatment session, however RN reporting pt being transferred to ICU. Will d/c OT orders at this time and await new OT orders post transfer.     Hien López, OT  7/13/2018

## 2018-07-13 NOTE — SUBJECTIVE & OBJECTIVE
Subjective:    History of Present Illness:  Mr. Adamson is a 64 yo M with PMH ESLD secondary to ETOH cirrhosis, CKD3, CAD.  Complications of liver disease include hyperbilirubinemia, hypoalbuminemia, severe malnutrition,   hepatic encephalopathy, thrombocytopenia, splenomegaly, ascites, hypervolemia, coagulopathy, portal HTN.  Pt recently admitted for severe hepatic encephalopathy requiring intubation but has been stable from mentation standpoint post resolution of acute enceophalopathy. Mr. Adamson was seen in clinic 6/22 and was noted to have significant hypervolemia, weeping from BLE, and VICKY on CKD3 on routine labs.  His MELD increased from 22 to 26 per labs- coordinator notified and MELD updated.  Cr elevated to 2.3 from baseline of 1.4.  Also with worsening ascites.  Pt reported increased pruritis, generalized fatigue and weakness.  He presented in wheelchair.  ROS otherwise negative.       Hospital Course:  Patient is listed for liver transplant, placed on internal hold 6/25 for HE then status 7 on 6/26 due to change in prescription coverage. He was re-activated on the list 7/3, MELD 22. Pt started on IV diuretics 6/22 and continued 6/23 and 6/24.  BLE edema and ascites signif improved with diuresis and kidney function also improved. During admission, patient had worsening encephalopathy and asterixis prompting infectious work up, all blood and urine cultures obtained during admission with NGTD. Intermittently, encephalopathy severe enough to warrant lactulose enemas. CT head obtained and negative. Paracentesis 6/25 negative for peritonitis per cell count and U/A negative. Pt also with concern for BLE cellulitis- upper legs bright red with lower BLE still with dark appearance of venous stasis. Placed on vancomycin with improvement in BLE redness 6/26. Vanc continued for 7 days (ended 7/1). His HE waxed and waned for several days requiring lactulose enemas. Pt eating minimally during period of encephalopathy  requiring gentle hydration with IVF 6/26. Of note, taco placement attempted 6/26 but unable secondary to agitation upon insertion prompting self resolving nose bleed. Micro lab called 6/26 PM with blood cx + for G+C in blood- pt continued on vanc which was started 6/25. ID consulted, suspect + blood cultures contaminant. EEG obtained 6/26 as pt with h/o serizures and negative. Pt continued on home Keppra. VICKY on admission initially improved with diuresis, but Cr sakina intermittently during admission likely related to aggressive diuresis. Of note, with chest pain overnight 6/26-6/27 that resolved without intervention and pt reported as mild.  EKG NSR with PVC, troponin 0.1 in setting of hypervolemia + VICKY.  Normal dobutamine stress 1/2018.  Cardiology consulted and felt not ACS, no need for further workup other than continue to treat current risk factors for CAD with ASA (pt already on ASA and statin as outpt) + BB for portal hypertension which was started. Repeat paracentesis 7/10, 4.8L off, no SBP. Hypernatremia noted 7/9, nephrology consulted to aid in management. Hypernatremia attributed to dehydration from frequent BM's, diuretics were held, d/c'd creon due to excessive diarrhea, and patient was treated with increasing po free water and IVF. Echo 7/9 with normal EF, no wall abnormality, mild tricuspid/mitral regurg.     Interval History:  Hypothermic overnight to 91.2 which corrected with warming blankets to 97.8. Worsening encephalopathy this AM, responsive to painful stimuli only. Hypotensive to 80/40 which responded to albumin 5% bolus, repeat BP cuff was 100's/50's. Will transfer to the ICU for persistent decreased mental status and resuscitation.    Scheduled Meds:   ceFEPime (MAXIPIME) IVPB  2 g Intravenous Q12H    ergocalciferol  50,000 Units Oral Q7 Days    fluconazole (DIFLUCAN) IVPB  400 mg Intravenous Once    heparin (porcine)  5,000 Units Subcutaneous Q8H    lactulose  45 g Oral TID     levETIRAcetam  500 mg Oral BID    lidocaine  2 patch Transdermal Q24H    omega-3 acid ethyl esters  2 g Oral Daily with breakfast    pantoprazole  40 mg Oral BID    rifAXImin  550 mg Oral BID    sertraline  50 mg Oral Daily    vancomycin 750 mg in dextrose 5 % 250 mL IVPB (ready to mix system)  750 mg Intravenous Once     Continuous Infusions:   sodium bicarbonate drip 100 mL/hr at 07/13/18 0501     PRN Meds:acetaminophen, diphenhydrAMINE-zinc acetate 1-0.1%, lactulose, lactulose, metoclopramide HCl, ondansetron, sodium chloride 0.9%    Review of Systems   Unable to perform ROS: Mental status change     Objective:     Vital Signs (Most Recent):  Temp: 97.2 °F (36.2 °C) (07/13/18 0751)  Pulse: 69 (07/13/18 0751)  Resp: 20 (07/13/18 0751)  BP: (!) 118/56 (07/13/18 0751)  SpO2: 98 % (07/13/18 0751) Vital Signs (24h Range):  Temp:  [91.2 °F (32.9 °C)-98 °F (36.7 °C)] 97.2 °F (36.2 °C)  Pulse:  [49-79] 69  Resp:  [15-20] 20  SpO2:  [97 %-100 %] 98 %  BP: ()/(40-61) 118/56     Weight: 128.6 kg (283 lb 8.2 oz)  Body mass index is 36.4 kg/m².    Intake/Output - Last 3 Shifts       07/11 0700 - 07/12 0659 07/12 0700 - 07/13 0659 07/13 0700 - 07/14 0659    P.O. 3490 180     I.V. (mL/kg) 1128 (8.9) 1116.7 (8.7)     Total Intake(mL/kg) 4618 (36.5) 1296.7 (10.1)     Urine (mL/kg/hr) 1050 (0.3) 0 (0)     Emesis/NG output 0 (0) 0 (0)     Other 0 (0) 0 (0)     Stool 0 (0) 0 (0)     Blood 0 (0) 0 (0)     Total Output 1050 0      Net +3568 +1296.7             Urine Occurrence 2 x 1 x     Stool Occurrence 6 x 3 x     Emesis Occurrence 1 x 0 x           Physical Exam   Constitutional: He appears well-nourished.   HENT:   Head: Normocephalic and atraumatic.   Neck: Neck supple.   Cardiovascular: Normal rate and regular rhythm.    Pulmonary/Chest: Effort normal and breath sounds normal. He has no wheezes.   Abdominal: Soft. He exhibits no distension.   Musculoskeletal: He exhibits edema.   Lymphadenopathy:     He has no  cervical adenopathy.   Skin: Skin is warm. There is erythema.       Laboratory:  Immunosuppressants     None        Labs within the past 24 hours have been reviewed.    Diagnostic Results:  I have personally reviewed all pertinent imaging studies.

## 2018-07-13 NOTE — PROGRESS NOTES
Report called to [facility] ICU RN.  Pt transferred to ICU 66 in hospital bed accompanied by RN x 2, portable monitoring maintained through transfer.  Pt arrived to ICU, transferred to bed and oriented to room.  Pt's family updated on pt's condition and location.

## 2018-07-13 NOTE — ASSESSMENT & PLAN NOTE
62 yo male with acute decompensated liver failure secondary to alcohol cirrhosis now with sCr trending up/with out improvement nephrology consulted for VICKY. Etiology most likely pre-renal. Also considered HRS but less likely given optimal BP and renal function labs.     -sCr at baseline 1.4-1.5, sCr 1.8 today, stable. Follow up with BMP . Continue D5- bicarb gtt for now.   -Renal function with out much improvement despite lasix and albumin   -U & L edema on physical exam today  -Increased stool out put 2/2 lactulose for HE  -No hypotensive episodes/blood loss/septic shock/nephrotoxind during current admit  -UA with hyaline cast, no proteinuria, has some pyuria, urine Na+ < 20, Urine Osmolarity increased (426) - more suggestive of pre-renal etiology  -Evidence of intravascular volume depletion given hypernatremia as well as increased stool out put, previously was also getting daily lasix until d/c recently  -HRS less likely- patient jameson likely volume depleted intravascularly: hypernatremia, low bicarb. Optimal BP.   -Repeat UA with 2+ proteins, > 100 RBC, UPC 1.15  -Renal US with corticomedullary thickening and left non obstructing stone  -Will get 2D Echo - normal EF/no diastolic dysfunction, IVC with > 50 collapse on sniff suggestive of hypovolemia  -Urine spun - hyaline cast   -Cr improved to 1.6 from 2.0 with Light hydration with (D5 + 2 amp HCO3 for 12 hours) yesterday  -Hypernatremic resolved 144 today , continue D5 @ 100 cc/hr and continue to encourage po intake.   -Encourage PO intake  -will reassess in the PM

## 2018-07-13 NOTE — PROGRESS NOTES
[facility]  Nephrology  Progress Note    Patient Name: Alon Adamson  MRN: [id number]  Admission Date: 6/22/2018  Hospital Length of Stay: 21 days  Attending Provider: Elan Guerra MD   Primary Care Physician: Mckinley Vides MD  Principal Problem:Acute kidney injury superimposed on chronic kidney disease    Subjective:     HPI: Mr. Adamson is 63 yr old male with decompensated alcoholic cirrhosis, CKD III and CAD admitted here on 06/22/18 admitted for bilateral LE hypervolemia and VICKY on CKD III with cr of 2.3 baseline around 1.5. Patient has multiple hospitalization in the past secondary to decompensated liver failure. Patient is currently on transplant team. During current admission patient was getting lasix and albumin intermittently for VICKY however renal function is not getting better. Patient hospital course is complicated by hepatic encephalopathy with some improvement of mentation with lactulose. Also treated for cellulitis with 7 days of vancomycin. Concern for seizures, EEG with out evidence of seizure activities. Patient had paracentesis on current admit but no evidence for SBP, only 1 lt removed. Nephrology is consulted for worsening/not improving VICKY despite lasix/albumin.     Per chart review patient has no hypotensive episodes, BP mostly above 100's. Has not received nephrotoxins such as NSAIDs/contrast media. No sever sepsis/septic shock or acute blood loss during current admit. UA with 2+ leukocytes and Hyaline casts, no wbc/rbc cast or proteinuria. Urine Na+ < 20.    Patient has no complaints when seen today. Family at bedside states his mentation has gotten better today, more lucid. Has increased stool out put as many as 10x daily at times, averages x4 daily. Appetite and enery level is poor but seems to be improving today per wife.       Interval History: Patient still somnolent. Hypernatremia has resolved. Cr stable 1.8 today. Continue D5 gtt.     Review of patient's  allergies indicates:   Allergen Reactions    Nsaids (non-steroidal anti-inflammatory drug)      D/t to liver disease.    Tylenol [acetaminophen]      D/t liver disease.    Penicillins Other (See Comments)     Tolerated pip-tazo in 8/2016 without issue  Since childhood was told not to take it     Current Facility-Administered Medications   Medication Frequency    acetaminophen tablet 650 mg Q8H PRN    ceFEPIme injection 2 g Q12H    diphenhydrAMINE-zinc acetate 1-0.1% cream TID PRN    ergocalciferol capsule 50,000 Units Q7 Days    heparin (porcine) injection 5,000 Units Q8H    lactulose 10 gram/15 mL solution (enema) 200 g Q6H PRN    lactulose 10 gram/15 mL solution (enema) 200 g Once    lactulose 20 gram/30 mL solution Soln 45 g TID    lactulose 20 gram/30 mL solution Soln 45 g Q6H PRN    levETIRAcetam tablet 500 mg BID    lidocaine 5 % patch 2 patch Q24H    metoclopramide HCl injection 10 mg Q6H PRN    omega-3 acid ethyl esters capsule 2 g Daily with breakfast    ondansetron disintegrating tablet 8 mg Q8H PRN    pantoprazole EC tablet 40 mg BID    rifAXIMin tablet 550 mg BID    sertraline tablet 50 mg Daily    sodium bicarbonate 1 mEq/mL (8.4 %) 50 mEq in dextrose 5 % 1,000 mL infusion Continuous    sodium chloride 0.9% flush 3 mL PRN       Objective:     Vital Signs (Most Recent):  Temp: 97.2 °F (36.2 °C) (07/13/18 0751)  Pulse: 82 (07/13/18 0942)  Resp: (!) 22 (07/13/18 0942)  BP: (!) 107/55 (07/13/18 0942)  SpO2: 100 % (07/13/18 0942)  O2 Device (Oxygen Therapy): room air (07/13/18 0942) Vital Signs (24h Range):  Temp:  [91.2 °F (32.9 °C)-98 °F (36.7 °C)] 97.2 °F (36.2 °C)  Pulse:  [49-82] 82  Resp:  [15-22] 22  SpO2:  [97 %-100 %] 100 %  BP: ()/(40-61) 107/55     Weight: 128.6 kg (283 lb 8.2 oz) (07/13/18 0645)  Body mass index is 36.4 kg/m².  Body surface area is 2.59 meters squared.    I/O last 3 completed shifts:  In: 3304.7 [P.O.:1680; I.V.:1624.7]  Out: 300  [Urine:300]    Physical Exam   Constitutional:   Patient very somnolent this morning.    HENT:   Head: Normocephalic and atraumatic.   Eyes: EOM are normal. Pupils are equal, round, and reactive to light.   Neck: Normal range of motion. Neck supple. No JVD present.   Cardiovascular: Normal rate and regular rhythm.    No murmur heard.  Pulmonary/Chest: Effort normal and breath sounds normal. No respiratory distress. He has no wheezes. He has no rales.   Abdominal: Soft. He exhibits no distension. There is no tenderness.   Active bowel sounds with succussion splash     Flapping tremors bilaterally    Musculoskeletal: Normal range of motion. He exhibits edema.   Neurological: No cranial nerve deficit.   Skin: Skin is dry. Capillary refill takes less than 2 seconds. There is erythema.   Dry scally erythematous rash in the bilateral LE with 2+ pitting edema    Also pitting edema on bilateral UE    Psychiatric: He has a normal mood and affect. His behavior is normal.       Significant Labs:  CBC:   Recent Labs  Lab 07/13/18  0506   WBC 3.16*   RBC 2.37*   HGB 8.0*   HCT 25.0*   PLT 45*   *   MCH 33.8*   MCHC 32.0     CMP:   Recent Labs  Lab 07/13/18  0506   *  169*   CALCIUM 9.4  9.4   ALBUMIN 2.7*   PROT 4.8*     145   K 3.8  3.8   CO2 17*  17*   *  119*   BUN 33*  34*   CREATININE 1.8*  1.7*   ALKPHOS 107   ALT 39   AST 50*   BILITOT 1.9*     All labs within the past 24 hours have been reviewed.           Assessment/Plan:     * Acute kidney injury superimposed on chronic kidney disease    62 yo male with acute decompensated liver failure secondary to alcohol cirrhosis now with sCr trending up/with out improvement nephrology consulted for VICKY. Etiology most likely pre-renal. Also considered HRS but less likely given optimal BP and renal function labs.     -sCr at baseline 1.4-1.5, sCr 1.8 today, stable. Follow up with BMP . Continue D5- bicarb gtt for now.   -Renal function with out much  improvement despite lasix and albumin   -U & L edema on physical exam today  -Increased stool out put 2/2 lactulose for HE  -No hypotensive episodes/blood loss/septic shock/nephrotoxind during current admit  -UA with hyaline cast, no proteinuria, has some pyuria, urine Na+ < 20, Urine Osmolarity increased (426) - more suggestive of pre-renal etiology  -Evidence of intravascular volume depletion given hypernatremia as well as increased stool out put, previously was also getting daily lasix until d/c recently  -HRS less likely- patient jameson likely volume depleted intravascularly: hypernatremia, low bicarb. Optimal BP.   -Repeat UA with 2+ proteins, > 100 RBC, UPC 1.15  -Renal US with corticomedullary thickening and left non obstructing stone  -Will get 2D Echo - normal EF/no diastolic dysfunction, IVC with > 50 collapse on sniff suggestive of hypovolemia  -Urine spun - hyaline cast   -Cr improved to 1.6 from 2.0 with Light hydration with (D5 + 2 amp HCO3 for 12 hours) yesterday  -Hypernatremic resolved 144 today , continue D5 @ 100 cc/hr and continue to encourage po intake.   -Encourage PO intake  -will reassess in the PM             Thank you for your consult. I will follow-up with patient. Please contact us if you have any additional questions.    Rogelio Nickerson MD  Nephrology  [facility]

## 2018-07-13 NOTE — SUBJECTIVE & OBJECTIVE
Interval History: Patient still somnolent. Hypernatremia has resolved. Cr stable 1.8 today. Continue D5 gtt.     Review of patient's allergies indicates:   Allergen Reactions    Nsaids (non-steroidal anti-inflammatory drug)      D/t to liver disease.    Tylenol [acetaminophen]      D/t liver disease.    Penicillins Other (See Comments)     Tolerated pip-tazo in 8/2016 without issue  Since childhood was told not to take it     Current Facility-Administered Medications   Medication Frequency    acetaminophen tablet 650 mg Q8H PRN    ceFEPIme injection 2 g Q12H    diphenhydrAMINE-zinc acetate 1-0.1% cream TID PRN    ergocalciferol capsule 50,000 Units Q7 Days    heparin (porcine) injection 5,000 Units Q8H    lactulose 10 gram/15 mL solution (enema) 200 g Q6H PRN    lactulose 10 gram/15 mL solution (enema) 200 g Once    lactulose 20 gram/30 mL solution Soln 45 g TID    lactulose 20 gram/30 mL solution Soln 45 g Q6H PRN    levETIRAcetam tablet 500 mg BID    lidocaine 5 % patch 2 patch Q24H    metoclopramide HCl injection 10 mg Q6H PRN    omega-3 acid ethyl esters capsule 2 g Daily with breakfast    ondansetron disintegrating tablet 8 mg Q8H PRN    pantoprazole EC tablet 40 mg BID    rifAXIMin tablet 550 mg BID    sertraline tablet 50 mg Daily    sodium bicarbonate 1 mEq/mL (8.4 %) 50 mEq in dextrose 5 % 1,000 mL infusion Continuous    sodium chloride 0.9% flush 3 mL PRN       Objective:     Vital Signs (Most Recent):  Temp: 97.2 °F (36.2 °C) (07/13/18 0751)  Pulse: 82 (07/13/18 0942)  Resp: (!) 22 (07/13/18 0942)  BP: (!) 107/55 (07/13/18 0942)  SpO2: 100 % (07/13/18 0942)  O2 Device (Oxygen Therapy): room air (07/13/18 0942) Vital Signs (24h Range):  Temp:  [91.2 °F (32.9 °C)-98 °F (36.7 °C)] 97.2 °F (36.2 °C)  Pulse:  [49-82] 82  Resp:  [15-22] 22  SpO2:  [97 %-100 %] 100 %  BP: ()/(40-61) 107/55     Weight: 128.6 kg (283 lb 8.2 oz) (07/13/18 0645)  Body mass index is 36.4 kg/m².  Body  surface area is 2.59 meters squared.    I/O last 3 completed shifts:  In: 3304.7 [P.O.:1680; I.V.:1624.7]  Out: 300 [Urine:300]    Physical Exam   Constitutional:   Patient very somnolent this morning.    HENT:   Head: Normocephalic and atraumatic.   Eyes: EOM are normal. Pupils are equal, round, and reactive to light.   Neck: Normal range of motion. Neck supple. No JVD present.   Cardiovascular: Normal rate and regular rhythm.    No murmur heard.  Pulmonary/Chest: Effort normal and breath sounds normal. No respiratory distress. He has no wheezes. He has no rales.   Abdominal: Soft. He exhibits no distension. There is no tenderness.   Active bowel sounds with succussion splash     Flapping tremors bilaterally    Musculoskeletal: Normal range of motion. He exhibits edema.   Neurological: No cranial nerve deficit.   Skin: Skin is dry. Capillary refill takes less than 2 seconds. There is erythema.   Dry scally erythematous rash in the bilateral LE with 2+ pitting edema    Also pitting edema on bilateral UE    Psychiatric: He has a normal mood and affect. His behavior is normal.       Significant Labs:  CBC:   Recent Labs  Lab 07/13/18  0506   WBC 3.16*   RBC 2.37*   HGB 8.0*   HCT 25.0*   PLT 45*   *   MCH 33.8*   MCHC 32.0     CMP:   Recent Labs  Lab 07/13/18  0506   *  169*   CALCIUM 9.4  9.4   ALBUMIN 2.7*   PROT 4.8*     145   K 3.8  3.8   CO2 17*  17*   *  119*   BUN 33*  34*   CREATININE 1.8*  1.7*   ALKPHOS 107   ALT 39   AST 50*   BILITOT 1.9*     All labs within the past 24 hours have been reviewed.

## 2018-07-13 NOTE — NURSING
Notified MD Jean unable to obtain pt oral or axillary temperature.  Rectal temp 91.2F Other vitals stable.  Ordered to place bear hugger on pt STAT.  Will continue to monitor.

## 2018-07-13 NOTE — PT/OT/SLP DISCHARGE
Physical Therapy Discharge Summary    Name: Alon Adamson  MRN: [id number]   Principal Problem: Acute kidney injury superimposed on chronic kidney disease     Patient Discharged from acute Physical Therapy on 18.  Please refer to prior PT noted date on 18 for functional status.     Assessment:     Patient was discharged unexpectedly.  Information required to complete an accurate discharge summary is unknown.  Refer to therapy initial evaluation and last progress note for initial and most recent functional status and goal achievement.  Recommendations made may be found in medical record.    Objective:     GOALS:    Physical Therapy Goals        Problem: Physical Therapy Goal    Goal Priority Disciplines Outcome Goal Variances Interventions   Physical Therapy Goal     PT/OT, PT Ongoing (interventions implemented as appropriate)     Description:  Goals to be met by: 18     Patient will increase functional independence with mobility by performin. Supine to sit with Stand-by Assistance. - GOAL MET   2. Sit to stand with S.  3. Gait x 200 ft with Supervision with RW.  4. Ascend/descend 5 stair with right Handrails with SBA.  5. Lower extremity exercise program x 20 reps, with supervision, in order to increase LE strength and (I) with functional mobility.   6. Pt to perf 6MWT: amb 440', to demonstrate a clinically significant improvement in aerobic capacity.                               Reasons for Discontinuation of Therapy Services  Transfer to alternate level of care.      Plan:     Patient Discharged to: ICU.    Danielle Monterroso, PT  2018

## 2018-07-13 NOTE — NURSING
Patient admitted to SICU from TSU via bed per multiple TSU RN's. Patient connected to SICU monitor. VSS. Patient is very lethargic, withdraws to pain, and opens eyes to pain. Patient is able to protect his airway, currently on room air with O2 saturation of 100%. Family updated. Will continue to monitor patient closely.

## 2018-07-13 NOTE — PROGRESS NOTES
[facility]  Liver Transplant  Progress Note    Patient Name: Alon Adamson  MRN: [id number]  Admission Date: 6/22/2018  Hospital Length of Stay: 21 days  Code Status: Full Code  Primary Care Provider: Mckinley Vides MD    Subjective:     Subjective:    History of Present Illness:  Mr. 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  Complications of liver disease include hyperbilirubinemia, hypoalbuminemia, severe malnutrition,   hepatic encephalopathy, thrombocytopenia, splenomegaly, ascites, hypervolemia, coagulopathy, portal HTN.  Pt recently admitted for severe hepatic encephalopathy requiring intubation but has been stable from mentation standpoint post resolution of acute enceophalopathy. Mr. Adamson was seen in clinic 6/22 and was noted to have significant hypervolemia, weeping from BLE, and VICKY on CKD3 on routine labs.  His MELD increased from 22 to 26 per labs- coordinator notified and MELD updated.  Cr elevated to 2.3 from baseline of 1.4.  Also with worsening ascites.  Pt reported increased pruritis, generalized fatigue and weakness.  He presented in wheelchair.  ROS otherwise negative.       Hospital Course:  Patient is listed for liver transplant, placed on internal hold 6/25 for HE then status 7 on 6/26 due to change in prescription coverage. He was re-activated on the list 7/3, MELD 22. Pt started on IV diuretics 6/22 and continued 6/23 and 6/24.  BLE edema and ascites signif improved with diuresis and kidney function also improved. During admission, patient had worsening encephalopathy and asterixis prompting infectious work up, all blood and urine cultures obtained during admission with NGTD. Intermittently, encephalopathy severe enough to warrant lactulose enemas. CT head obtained and negative. Paracentesis 6/25 negative for peritonitis per cell count and U/A negative. Pt also with concern for BLE cellulitis- upper legs bright red with lower BLE still  with dark appearance of venous stasis. Placed on vancomycin with improvement in BLE redness 6/26. Vanc continued for 7 days (ended 7/1). His HE waxed and waned for several days requiring lactulose enemas. Pt eating minimally during period of encephalopathy requiring gentle hydration with IVF 6/26. Of note, taco placement attempted 6/26 but unable secondary to agitation upon insertion prompting self resolving nose bleed. Micro lab called 6/26 PM with blood cx + for G+C in blood- pt continued on vanc which was started 6/25. ID consulted, suspect + blood cultures contaminant. EEG obtained 6/26 as pt with h/o serizures and negative. Pt continued on home Keppra. VICKY on admission initially improved with diuresis, but Cr sakina intermittently during admission likely related to aggressive diuresis. Of note, with chest pain overnight 6/26-6/27 that resolved without intervention and pt reported as mild.  EKG NSR with PVC, troponin 0.1 in setting of hypervolemia + VICKY.  Normal dobutamine stress 1/2018.  Cardiology consulted and felt not ACS, no need for further workup other than continue to treat current risk factors for CAD with ASA (pt already on ASA and statin as outpt) + BB for portal hypertension which was started. Repeat paracentesis 7/10, 4.8L off, no SBP. Hypernatremia noted 7/9, nephrology consulted to aid in management. Hypernatremia attributed to dehydration from frequent BM's, diuretics were held, d/c'd creon due to excessive diarrhea, and patient was treated with increasing po free water and IVF. Echo 7/9 with normal EF, no wall abnormality, mild tricuspid/mitral regurg.     Interval History:  Hypothermic overnight to 91.2 which corrected with warming blankets to 97.8. Worsening encephalopathy this AM, responsive to painful stimuli only. Hypotensive to 80/40 which responded to albumin 5% bolus, repeat BP cuff was 100's/50's. Will transfer to the ICU for persistent decreased mental status and  resuscitation.    Scheduled Meds:   ceFEPime (MAXIPIME) IVPB  2 g Intravenous Q12H    ergocalciferol  50,000 Units Oral Q7 Days    fluconazole (DIFLUCAN) IVPB  400 mg Intravenous Once    heparin (porcine)  5,000 Units Subcutaneous Q8H    lactulose  45 g Oral TID    levETIRAcetam  500 mg Oral BID    lidocaine  2 patch Transdermal Q24H    omega-3 acid ethyl esters  2 g Oral Daily with breakfast    pantoprazole  40 mg Oral BID    rifAXImin  550 mg Oral BID    sertraline  50 mg Oral Daily    vancomycin 750 mg in dextrose 5 % 250 mL IVPB (ready to mix system)  750 mg Intravenous Once     Continuous Infusions:   sodium bicarbonate drip 100 mL/hr at 07/13/18 0501     PRN Meds:acetaminophen, diphenhydrAMINE-zinc acetate 1-0.1%, lactulose, lactulose, metoclopramide HCl, ondansetron, sodium chloride 0.9%    Review of Systems   Unable to perform ROS: Mental status change     Objective:     Vital Signs (Most Recent):  Temp: 97.2 °F (36.2 °C) (07/13/18 0751)  Pulse: 69 (07/13/18 0751)  Resp: 20 (07/13/18 0751)  BP: (!) 118/56 (07/13/18 0751)  SpO2: 98 % (07/13/18 0751) Vital Signs (24h Range):  Temp:  [91.2 °F (32.9 °C)-98 °F (36.7 °C)] 97.2 °F (36.2 °C)  Pulse:  [49-79] 69  Resp:  [15-20] 20  SpO2:  [97 %-100 %] 98 %  BP: ()/(40-61) 118/56     Weight: 128.6 kg (283 lb 8.2 oz)  Body mass index is 36.4 kg/m².    Intake/Output - Last 3 Shifts       07/11 0700 - 07/12 0659 07/12 0700 - 07/13 0659 07/13 0700 - 07/14 0659    P.O. 3490 180     I.V. (mL/kg) 1128 (8.9) 1116.7 (8.7)     Total Intake(mL/kg) 4618 (36.5) 1296.7 (10.1)     Urine (mL/kg/hr) 1050 (0.3) 0 (0)     Emesis/NG output 0 (0) 0 (0)     Other 0 (0) 0 (0)     Stool 0 (0) 0 (0)     Blood 0 (0) 0 (0)     Total Output 1050 0      Net +3568 +1296.7             Urine Occurrence 2 x 1 x     Stool Occurrence 6 x 3 x     Emesis Occurrence 1 x 0 x           Physical Exam   Constitutional: He appears well-nourished.   HENT:   Head: Normocephalic and atraumatic.    Neck: Neck supple.   Cardiovascular: Normal rate and regular rhythm.    Pulmonary/Chest: Effort normal and breath sounds normal. He has no wheezes.   Abdominal: Soft. He exhibits no distension.   Musculoskeletal: He exhibits edema.   Lymphadenopathy:     He has no cervical adenopathy.   Skin: Skin is warm. There is erythema.       Laboratory:  Immunosuppressants     None        Labs within the past 24 hours have been reviewed.    Diagnostic Results:  I have personally reviewed all pertinent imaging studies.    Assessment/Plan:   Alon Adamson is a 63 y.o. male     Neuro   Seizures    - Oral Keppra transitioned to IV as pt not able to swallow 6/26.  - Resumed oral Keppra since mentation improved.  - EEG 6/26 negative.        Derm   Dermatitis associated with moisture              Cholestatic pruritus    - cholestyramine has previously been ineffective, given depressed mood; will initiate zoloft for mood and pruritus.        Cardiac/Vascular   Coronary artery disease involving native coronary artery of native heart without angina pectoris    - Continue home ASA.  - 2D Echo 7/9 normal EF (55-60%), trivial tricuspid/mitral regurg.        Renal/   * Acute kidney injury superimposed on chronic kidney disease    - VICKY initially improving with albumin infusion but with minimal intake 6/25 and 6/26.  - dc sodium bicarb due to hypernatremia  - Holding diuretics (previously lasix 40mg daily) due to elevated Cr  - nephrology consulted due to hypervolemia and multiple electrolyte abnormalities (acidosis, hypernatremia)  - Cr improving with hydration        Hypernatremia    - worsening hypernatremia likely related to dehydration from GI loss with lactulose, large volume para 7/10 (4.8L off)  - nephrology consulted  - Na worsened this AM, encouraging patient to drink water, d5w infusion started per neph, monitor fluid status very closely as patient easily becomes hypervolemic   - continue continuous IVF for persistent  "hypernatremia 2/2 GI loss  - increase oral free water        CKD (chronic kidney disease), stage III    - see acute kidney injury superimposed on CKD III        Hematology   Coagulopathy    - Secondary to decompensated liver disease.        Thrombocytopenia    - Secondary to splenomegaly from portal HTN- should improve with txp.  - No s/s overt bleed.        Oncology   Pancytopenia    - related to decompensated liver disease        Anemia of chronic disease    - H&H stable.  - 1 unit PRBC transfused 6/28.         Endocrine   Severe protein-calorie malnutrition    - Dietary consulted for calorie count.  - Pt eating better accord to family.  Does take boost supplement.  - "pre-hab" regimen and supplements ordered. Dc creon due to excessive diarrhea  - Patient drinking at least 2-3 boost supplements and beneprotein daily.   - If continues to have low intake, consider starting Megace.   - c/o persistent nausea, vomiting overnight with brown emesis, KUB 7/9 with scattered bowel gas.         GI   Ascites due to alcoholic cirrhosis    - Para 6/25 negative for infection   - Para 7/10 negative for infection  - holding diuretics due to dehydration/VICKY        Decompensated hepatic cirrhosis    - Placed status 7 on 6/26 for prescription drug coverage change. Re-activated 7/3, MELD 22. Listing tenuous due to frailty and malnutrition. Plan to submit MELD exception points.   - dc propanolol    MELD-Na score: 20 at 7/12/2018  5:48 AM  MELD score: 20 at 7/12/2018  5:48 AM  Calculated from:  Serum Creatinine: 1.6 mg/dL at 7/12/2018  5:48 AM  Serum Sodium: 150 mmol/L (Rounded to 137) at 7/12/2018  5:48 AM  Total Bilirubin: 2.2 mg/dL at 7/12/2018  5:48 AM  INR(ratio): 1.7 at 7/12/2018  5:48 AM  Age: 63 years        Hepatic encephalopathy    - Acute on chronic  - step up to ICU for closer monitoring of volume status and mental status  - blood cultures redrawn this AM and restarted vanc/cefepime  - repeat ammonia pending  - lactulose " enema this AM, will repeat as necessary  - will continue to monitor closely        Other   Physical deconditioning    - PT/OT consulted.  - encourage OOBTC for several hours each day, walking with walker with PT        Hypoalbuminemia due to protein-calorie malnutrition    - see severe protein-calorie malnutrition        Bilateral edema of lower extremity    - completed vanc x 7 days for BLE cellulitis  - 2D Echo 7/9 normal EF (55-60%), trivial tricuspid/mitral regurg.  - holding diuretics for elevated Cr/dehydration            The patients clinical status was discussed at multidisplinary rounds, involving transplant surgery, transplant medicine, pharmacy, nursing, nutrition, and social work.    Terry Huff MD  Liver Transplant  [facility]

## 2018-07-13 NOTE — ASSESSMENT & PLAN NOTE
- Acute on chronic  - step up to ICU for closer monitoring of volume status and mental status  - blood cultures redrawn this AM and restarted vanc/cefepime  - repeat ammonia pending  - lactulose enema this AM, will repeat as necessary  - will continue to monitor closely

## 2018-07-14 LAB
ALBUMIN SERPL BCP-MCNC: 2.8 G/DL
ALBUMIN SERPL BCP-MCNC: 2.8 G/DL
ALLENS TEST: ABNORMAL
ALP SERPL-CCNC: 77 U/L
ALP SERPL-CCNC: 77 U/L
ALT SERPL W/O P-5'-P-CCNC: 37 U/L
ALT SERPL W/O P-5'-P-CCNC: 37 U/L
AMMONIA PLAS-SCNC: 59 UMOL/L
AMMONIA PLAS-SCNC: 73 UMOL/L
ANION GAP SERPL CALC-SCNC: 8 MMOL/L
ANION GAP SERPL CALC-SCNC: 8 MMOL/L
ANION GAP SERPL CALC-SCNC: 9 MMOL/L
ANION GAP SERPL CALC-SCNC: 9 MMOL/L
AST SERPL-CCNC: 49 U/L
AST SERPL-CCNC: 49 U/L
BACTERIA BLD CULT: NORMAL
BACTERIA BLD CULT: NORMAL
BACTERIA UR CULT: NO GROWTH
BASOPHILS # BLD AUTO: 0.04 K/UL
BASOPHILS # BLD AUTO: 0.04 K/UL
BASOPHILS NFR BLD: 1.4 %
BASOPHILS NFR BLD: 1.4 %
BILIRUB SERPL-MCNC: 2.4 MG/DL
BILIRUB SERPL-MCNC: 2.4 MG/DL
BUN SERPL-MCNC: 38 MG/DL
BUN SERPL-MCNC: 39 MG/DL
CALCIUM SERPL-MCNC: 9.3 MG/DL
CALCIUM SERPL-MCNC: 9.4 MG/DL
CALCIUM SERPL-MCNC: 9.4 MG/DL
CALCIUM SERPL-MCNC: 9.6 MG/DL
CHLORIDE SERPL-SCNC: 121 MMOL/L
CHLORIDE SERPL-SCNC: 121 MMOL/L
CHLORIDE SERPL-SCNC: 123 MMOL/L
CHLORIDE SERPL-SCNC: 124 MMOL/L
CO2 SERPL-SCNC: 18 MMOL/L
CO2 SERPL-SCNC: 19 MMOL/L
CO2 SERPL-SCNC: 19 MMOL/L
CO2 SERPL-SCNC: 20 MMOL/L
CREAT SERPL-MCNC: 1.6 MG/DL
CREAT SERPL-MCNC: 1.7 MG/DL
DELSYS: ABNORMAL
DIFFERENTIAL METHOD: ABNORMAL
DIFFERENTIAL METHOD: ABNORMAL
EOSINOPHIL # BLD AUTO: 0.3 K/UL
EOSINOPHIL # BLD AUTO: 0.3 K/UL
EOSINOPHIL NFR BLD: 11 %
EOSINOPHIL NFR BLD: 11 %
ERYTHROCYTE [DISTWIDTH] IN BLOOD BY AUTOMATED COUNT: 20.6 %
ERYTHROCYTE [DISTWIDTH] IN BLOOD BY AUTOMATED COUNT: 20.6 %
EST. GFR  (AFRICAN AMERICAN): 48.9 ML/MIN/1.73 M^2
EST. GFR  (AFRICAN AMERICAN): 52.6 ML/MIN/1.73 M^2
EST. GFR  (NON AFRICAN AMERICAN): 42.3 ML/MIN/1.73 M^2
EST. GFR  (NON AFRICAN AMERICAN): 45.5 ML/MIN/1.73 M^2
GLUCOSE SERPL-MCNC: 174 MG/DL
GLUCOSE SERPL-MCNC: 182 MG/DL
GLUCOSE SERPL-MCNC: 182 MG/DL
GLUCOSE SERPL-MCNC: 194 MG/DL
HCO3 UR-SCNC: 19.3 MMOL/L (ref 24–28)
HCT VFR BLD AUTO: 24.3 %
HCT VFR BLD AUTO: 24.3 %
HGB BLD-MCNC: 8.2 G/DL
HGB BLD-MCNC: 8.2 G/DL
IMM GRANULOCYTES # BLD AUTO: 0 K/UL
IMM GRANULOCYTES # BLD AUTO: 0 K/UL
IMM GRANULOCYTES NFR BLD AUTO: 0 %
IMM GRANULOCYTES NFR BLD AUTO: 0 %
INR PPP: 1.7
INR PPP: 1.7
LYMPHOCYTES # BLD AUTO: 1 K/UL
LYMPHOCYTES # BLD AUTO: 1 K/UL
LYMPHOCYTES NFR BLD: 35.2 %
LYMPHOCYTES NFR BLD: 35.2 %
MAGNESIUM SERPL-MCNC: 2.5 MG/DL
MAGNESIUM SERPL-MCNC: 2.5 MG/DL
MCH RBC QN AUTO: 34.7 PG
MCH RBC QN AUTO: 34.7 PG
MCHC RBC AUTO-ENTMCNC: 33.7 G/DL
MCHC RBC AUTO-ENTMCNC: 33.7 G/DL
MCV RBC AUTO: 103 FL
MCV RBC AUTO: 103 FL
MONOCYTES # BLD AUTO: 0.4 K/UL
MONOCYTES # BLD AUTO: 0.4 K/UL
MONOCYTES NFR BLD: 13.9 %
MONOCYTES NFR BLD: 13.9 %
NEUTROPHILS # BLD AUTO: 1.1 K/UL
NEUTROPHILS # BLD AUTO: 1.1 K/UL
NEUTROPHILS NFR BLD: 38.5 %
NEUTROPHILS NFR BLD: 38.5 %
NRBC BLD-RTO: 1 /100 WBC
NRBC BLD-RTO: 1 /100 WBC
PCO2 BLDA: 30.5 MMHG (ref 35–45)
PH SMN: 7.41 [PH] (ref 7.35–7.45)
PHOSPHATE SERPL-MCNC: 3.8 MG/DL
PHOSPHATE SERPL-MCNC: 3.8 MG/DL
PLATELET # BLD AUTO: 43 K/UL
PLATELET # BLD AUTO: 43 K/UL
PMV BLD AUTO: 13.7 FL
PMV BLD AUTO: 13.7 FL
PO2 BLDA: 76 MMHG (ref 80–100)
POC BE: -5 MMOL/L
POC SATURATED O2: 95 % (ref 95–100)
POC TCO2: 20 MMOL/L (ref 23–27)
POCT GLUCOSE: 206 MG/DL (ref 70–110)
POTASSIUM SERPL-SCNC: 3.4 MMOL/L
POTASSIUM SERPL-SCNC: 3.5 MMOL/L
POTASSIUM SERPL-SCNC: 3.5 MMOL/L
POTASSIUM SERPL-SCNC: 3.6 MMOL/L
PROT SERPL-MCNC: 5 G/DL
PROT SERPL-MCNC: 5 G/DL
PROTHROMBIN TIME: 16.5 SEC
PROTHROMBIN TIME: 16.5 SEC
RBC # BLD AUTO: 2.36 M/UL
RBC # BLD AUTO: 2.36 M/UL
SAMPLE: ABNORMAL
SITE: ABNORMAL
SODIUM SERPL-SCNC: 148 MMOL/L
SODIUM SERPL-SCNC: 148 MMOL/L
SODIUM SERPL-SCNC: 151 MMOL/L
SODIUM SERPL-SCNC: 152 MMOL/L
WBC # BLD AUTO: 2.81 K/UL
WBC # BLD AUTO: 2.81 K/UL

## 2018-07-14 PROCEDURE — 25000003 PHARM REV CODE 250: Mod: NTX | Performed by: STUDENT IN AN ORGANIZED HEALTH CARE EDUCATION/TRAINING PROGRAM

## 2018-07-14 PROCEDURE — 63600175 PHARM REV CODE 636 W HCPCS: Mod: NTX | Performed by: STUDENT IN AN ORGANIZED HEALTH CARE EDUCATION/TRAINING PROGRAM

## 2018-07-14 PROCEDURE — 82803 BLOOD GASES ANY COMBINATION: CPT | Mod: NTX

## 2018-07-14 PROCEDURE — 25000003 PHARM REV CODE 250: Mod: NTX | Performed by: PHYSICIAN ASSISTANT

## 2018-07-14 PROCEDURE — 25000003 PHARM REV CODE 250: Mod: NTX | Performed by: INTERNAL MEDICINE

## 2018-07-14 PROCEDURE — 85610 PROTHROMBIN TIME: CPT | Mod: NTX

## 2018-07-14 PROCEDURE — 99223 1ST HOSP IP/OBS HIGH 75: CPT | Mod: NTX,,, | Performed by: SURGERY

## 2018-07-14 PROCEDURE — 20000000 HC ICU ROOM: Mod: NTX

## 2018-07-14 PROCEDURE — 84100 ASSAY OF PHOSPHORUS: CPT | Mod: NTX

## 2018-07-14 PROCEDURE — 37799 UNLISTED PX VASCULAR SURGERY: CPT | Mod: NTX

## 2018-07-14 PROCEDURE — 25000003 PHARM REV CODE 250: Mod: NTX | Performed by: NURSE PRACTITIONER

## 2018-07-14 PROCEDURE — 63600175 PHARM REV CODE 636 W HCPCS: Mod: NTX | Performed by: PHYSICIAN ASSISTANT

## 2018-07-14 PROCEDURE — 82140 ASSAY OF AMMONIA: CPT | Mod: 91,NTX

## 2018-07-14 PROCEDURE — 85025 COMPLETE CBC W/AUTO DIFF WBC: CPT | Mod: NTX

## 2018-07-14 PROCEDURE — 80048 BASIC METABOLIC PNL TOTAL CA: CPT | Mod: NTX

## 2018-07-14 PROCEDURE — 83735 ASSAY OF MAGNESIUM: CPT | Mod: NTX

## 2018-07-14 PROCEDURE — 80053 COMPREHEN METABOLIC PANEL: CPT | Mod: NTX

## 2018-07-14 PROCEDURE — 99232 SBSQ HOSP IP/OBS MODERATE 35: CPT | Mod: NTX,,, | Performed by: INTERNAL MEDICINE

## 2018-07-14 RX ORDER — FLUCONAZOLE 100 MG/1
200 TABLET ORAL DAILY
Status: DISCONTINUED | OUTPATIENT
Start: 2018-07-14 | End: 2018-08-16

## 2018-07-14 RX ORDER — POTASSIUM CHLORIDE 20 MEQ/15ML
20 SOLUTION ORAL ONCE
Status: COMPLETED | OUTPATIENT
Start: 2018-07-14 | End: 2018-07-14

## 2018-07-14 RX ADMIN — LEVETIRACETAM 500 MG: 500 TABLET, FILM COATED ORAL at 08:07

## 2018-07-14 RX ADMIN — LACTULOSE 45 G: 20 SOLUTION ORAL at 08:07

## 2018-07-14 RX ADMIN — LIDOCAINE 2 PATCH: 50 PATCH TOPICAL at 12:07

## 2018-07-14 RX ADMIN — PANTOPRAZOLE SODIUM 40 MG: 40 TABLET, DELAYED RELEASE ORAL at 08:07

## 2018-07-14 RX ADMIN — HEPARIN SODIUM 5000 UNITS: 5000 INJECTION, SOLUTION INTRAVENOUS; SUBCUTANEOUS at 10:07

## 2018-07-14 RX ADMIN — OMEGA-3-ACID ETHYL ESTERS 2 G: 1 CAPSULE, LIQUID FILLED ORAL at 09:07

## 2018-07-14 RX ADMIN — FLUCONAZOLE 200 MG: 200 TABLET ORAL at 08:07

## 2018-07-14 RX ADMIN — HEPARIN SODIUM 5000 UNITS: 5000 INJECTION, SOLUTION INTRAVENOUS; SUBCUTANEOUS at 02:07

## 2018-07-14 RX ADMIN — HEPARIN SODIUM 5000 UNITS: 5000 INJECTION, SOLUTION INTRAVENOUS; SUBCUTANEOUS at 09:07

## 2018-07-14 RX ADMIN — SERTRALINE HYDROCHLORIDE 50 MG: 50 TABLET ORAL at 08:07

## 2018-07-14 RX ADMIN — RIFAXIMIN 550 MG: 550 TABLET ORAL at 08:07

## 2018-07-14 RX ADMIN — VANCOMYCIN HYDROCHLORIDE 750 MG: 750 INJECTION, POWDER, LYOPHILIZED, FOR SOLUTION INTRAVENOUS at 09:07

## 2018-07-14 RX ADMIN — LACTULOSE 200 G: 10 SOLUTION ORAL at 04:07

## 2018-07-14 RX ADMIN — CEFEPIME 2 G: 2 INJECTION, POWDER, FOR SOLUTION INTRAVENOUS at 08:07

## 2018-07-14 RX ADMIN — POTASSIUM CHLORIDE 20 MEQ: 20 SOLUTION ORAL at 09:07

## 2018-07-14 NOTE — PROGRESS NOTES
CTS notified of labs, assessment, drop in U/O and VS. No new orders at this time. Will continue to monitor.

## 2018-07-14 NOTE — HPI
Alon Adamson is a 63 y.o. male with hx of ESLD secondary to EtOH cirrhosis with severe complications (hyperbilirubinemia, hypoalbuminemia, severe malnutrition, hepatic encephalopathy, thrombocytopenia, splenomegaly, ascites, hypervolemia, coaguloopathy, portal HTN), seizure disorder on Keppra, CKD3, CAD. Admitted to hospital on 6/22/18 and has had prolonged stay prior to transplant, mostly due to HE. Patient is now s/p OLT on 7/23/18. He underwent intra-op CRRT. Received 9 units PRBC, 2 FFP, 2 cryo, 2 platelets, 4.4 L cell saver. Brought to the ICU intubated and off pressors.

## 2018-07-14 NOTE — ASSESSMENT & PLAN NOTE
62 yo male with acute decompensated liver failure secondary to alcohol cirrhosis now with sCr trending up/with out improvement nephrology consulted for VICKY. Etiology most likely pre-renal. Also considered HRS but less likely given optimal BP and renal function labs.     -sCr at baseline 1.4-1.5, sCr stable/slightly improved this morning at 1.7 today  -no need for RRT today, continue to monitor serial RFPs and strict Is & Os

## 2018-07-14 NOTE — PROGRESS NOTES
[facility]  Critical Care - Surgery  Progress Note    Patient Name: Alon Adamson  MRN: [id number]  Admission Date: 6/22/2018  Hospital Length of Stay: 22 days  Code Status: Full Code  Attending Provider: Elan Guerra MD  Primary Care Provider: Mckinley Vides MD   Principal Problem: Acute kidney injury superimposed on chronic kidney disease    Subjective:     Hospital/ICU Course:  Per tranplant, Patient is listed for liver transplant, placed on internal hold 6/25 for HE then status 7 on 6/26 due to change in prescription coverage. He was re-activated on the list 7/3, MELD 22. Pt started on IV diuretics 6/22 and continued 6/23 and 6/24.  BLE edema and ascites sign if improved with diuresis and kidney function also improved. During admission, patient had worsening encephalopathy and asterixis prompting infectious work up, all blood and urine cultures obtained during admission with NGTD. Intermittently, encephalopathy severe enough to warrant lactulose enemas. CT head obtained and negative. Paracentesis 6/25 negative for peritonitis per cell count and U/A negative. Pt also with concern for BLE cellulitis- upper legs bright red with lower BLE still with dark appearance of venous stasis. Placed on vancomycin with improvement in BLE redness 6/26. Vanc continued for 7 days (ended 7/1). His HE waxed and waned for several days requiring lactulose enemas. Pt eating minimally during period of encephalopathy requiring gentle hydration with IVF 6/26. Of note, taco placement attempted 6/26 but unable secondary to agitation upon insertion prompting self resolving nose bleed. Micro lab called 6/26 PM with blood cx + for G+C in blood- pt continued on vanc which was started 6/25. ID consulted, suspect + blood cultures contaminant. EEG obtained 6/26 as pt with h/o serizures and negative. Pt continued on home Keppra. VICKY on admission initially improved with diuresis, but Cr sakina intermittently during admission  likely related to aggressive diuresis. Of note, with chest pain overnight 6/26-6/27 that resolved without intervention and pt reported as mild.  EKG NSR with PVC, troponin 0.1 in setting of hypervolemia + VICKY.  Normal dobutamine stress 1/2018.  Cardiology consulted and felt not ACS, no need for further workup other than continue to treat current risk factors for CAD with ASA (pt already on ASA and statin as outpt) + BB for portal hypertension which was started. Repeat paracentesis 7/10, 4.8L off, no SBP. Hypernatremia noted 7/9, nephrology consulted to aid in management. Hypernatremia attributed to dehydration from frequent BM's, diuretics were held, d/c'd creon due to excessive diarrhea, and patient was treated with increasing po free water and IVF. Echo 7/9 with normal EF, no wall abnormality, mild tricuspid/mitral regurg.     Overnight from 7/12 to 7/13, pt became hypothermic to 91.2 corrected with warming blankets, experienced worsening encephalopathy, hypotensive to 80/40.  Transferred to SICU.  Corrected with albumin 5% bolus.  Given additional lactulose.    Interval History/Significant Events: AMS improved, still disoriented and not very communicative but alert and smiles, per family at bedside patient is getting to baseline, ammonia improved with lactulose.  Temperature stable.  Cr and Na continuing to improve.  BP improved with fluids, not on pressors.        Objective:     Vital Signs (Most Recent):  Temp: 97.5 °F (36.4 °C) (07/14/18 0715)  Pulse: 83 (07/14/18 1000)  Resp: 15 (07/14/18 1000)  BP: (!) 124/55 (07/14/18 1000)  SpO2: 99 % (07/14/18 1000) Vital Signs (24h Range):  Temp:  [97.5 °F (36.4 °C)-98 °F (36.7 °C)] 97.5 °F (36.4 °C)  Pulse:  [68-97] 83  Resp:  [13-35] 15  SpO2:  [91 %-100 %] 99 %  BP: ()/(47-77) 124/55     Weight: 128.6 kg (283 lb 8.2 oz)  Body mass index is 36.4 kg/m².      Intake/Output Summary (Last 24 hours) at 07/14/18 1126  Last data filed at 07/14/18 1000   Gross per 24  hour   Intake             3947 ml   Output              600 ml   Net             3347 ml       Physical Exam   Constitutional: He appears well-developed and well-nourished. No distress.   HENT:   Head: Normocephalic and atraumatic.   Neck: Neck supple.   Cardiovascular: Normal rate and regular rhythm.    Pulmonary/Chest: Effort normal and breath sounds normal. He has no wheezes.   Abdominal: Soft. He exhibits no distension.   Musculoskeletal: He exhibits edema.   Lymphadenopathy:     He has no cervical adenopathy.   Neurological: He is alert.   Disoriented, but improving to baseline mentation   Skin: Skin is warm. He is not diaphoretic. There is erythema.       Vents:       Lines/Drains/Airways     Drain                 Urethral Catheter 07/13/18 0713 1 day          Peripheral Intravenous Line                 Peripheral IV - Single Lumen 07/11/18 0628 Right Forearm 3 days         Peripheral IV - Single Lumen 07/13/18 0625 Left Forearm 1 day                Significant Labs:    CBC/Anemia Profile:    Recent Labs  Lab 07/13/18  0506 07/14/18  0423   WBC 3.16* 2.81*  2.81*   HGB 8.0* 8.2*  8.2*   HCT 25.0* 24.3*  24.3*   PLT 45* 43*  43*   * 103*  103*   RDW 20.5* 20.6*  20.6*        Chemistries:    Recent Labs  Lab 07/12/18  2215 07/13/18  0506 07/14/18  0423    144  145 148*  148*   K 3.7 3.8  3.8 3.5  3.5   * 119*  119* 121*  121*   CO2 18* 17*  17* 19*  19*   BUN 33* 33*  34* 39*  39*   CREATININE 1.7* 1.8*  1.7* 1.7*  1.7*   CALCIUM 9.6 9.4  9.4 9.4  9.4   ALBUMIN  --  2.7* 2.8*  2.8*   PROT  --  4.8* 5.0*  5.0*   BILITOT  --  1.9* 2.4*  2.4*   ALKPHOS  --  107 77  77   ALT  --  39 37  37   AST  --  50* 49*  49*   MG  --  2.5 2.5  2.5   PHOS  --  3.8 3.8  3.8       All pertinent labs within the past 24 hours have been reviewed.    Significant Imaging:  I have reviewed all pertinent imaging results/findings within the past 24 hours.    Assessment/Plan:     Hepatic  encephalopathy    Activity  Plan: PT/OT consulted; encourage OOBTC for several hours each day, walking with walker with PT    Neuro  - Oral Keppra transitioned to IV as pt not able to swallow 6/26.  - Resumed oral Keppra since mentation improved.  - EEG 6/26 negative.  - Hepatic encephalopathy and ammonia improving with lactulose.      Cardiovascular  - h/o CAD  - Continue home ASA.  - 2D Echo 7/9 normal EF (55-60%), trivial tricuspid/mitral regurg    Respiratory:  -no acute issues, satting well, improved RR    Renal:  -VICKY on CKD, hypervolemia, and hypernatremia: related to dehydration from GI loss with lactulose and paracentesis. nephro following, continue D5, bicarb halted due to hypernatremia.  Encourage PO free water.  -Monitor BUN/Cr, Cr stable at 1.7, previous baseline 1.4-1.5  -UO 20-30mL/hr, continue to monitor, appreciate nephro    Liver and Pancreas  -coagulopathy secondary to decompensated liver disease  -thrombocytopenia secondary to splenomegaly from portal HTN, no acute bleeding.  -paracentesis 6/25 and 7/10 negative for infection  -lactulose enema    GI  - cholestatic pruritis: cholestyramine has previously been ineffective  -severe protein calorie malnutrition, Boost and beneprotein ordered, consider megace       ID  -Vanc/cefepime started 7/13 for sepsis signs and symptoms  -BCx drawn 7/13  -Fluconazole added 7/14 to expand coverage    Heme  - Secondary to splenomegaly from portal HTN- should improve with txp.  - No s/s overt bleed.    Dispo  -patient's hypotension, AMS, hypothermia improved  -Continue SICU care  -Primary service: Transplant Surgery          Critical care was time spent personally by me on the following activities: development of treatment plan with patient or surrogate and bedside caregivers, discussions with consultants, evaluation of patient's response to treatment, examination of patient, ordering and performing treatments and interventions, ordering and review of laboratory  studies, ordering and review of radiographic studies, pulse oximetry, re-evaluation of patient's condition.  This critical care time did not overlap with that of any other provider or involve time for any procedures.     Ernesto Fraire MD  Critical Care - Surgery  [facility]

## 2018-07-14 NOTE — SUBJECTIVE & OBJECTIVE
Past Medical History:   Diagnosis Date    Anticoagulant long-term use     Arthritis     Back pain     Cellulitis     Cirrhosis     Coronary artery disease     DM (diabetes mellitus)     Hearing loss     right ear    Hepatic encephalopathy     Hypertension     diagnosed today (11/25/2015) and prescribed toprol    Leg edema     Nephrolithiasis     JACK (obstructive sleep apnea)     Seizures     Splenomegaly        Past Surgical History:   Procedure Laterality Date    APPENDECTOMY      Open    BACK SURGERY      CHOLECYSTECTOMY      laprascopic    COLONOSCOPY N/A 1/25/2018    Procedure: COLONOSCOPY;  Surgeon: Lashawn Myles MD;  Location: [address]);  Service: Endoscopy;  Laterality: N/A;    LUMBAR DISCECTOMY      SPLENIC ARTERY EMBOLIZATION  04/08/2016    Dr Taveras    TONSILLECTOMY         Review of patient's allergies indicates:   Allergen Reactions    Nsaids (non-steroidal anti-inflammatory drug)      D/t to liver disease.    Tylenol [acetaminophen]      D/t liver disease.    Penicillins Other (See Comments)     Tolerated pip-tazo in 8/2016 without issue  Since childhood was told not to take it       Family History     None        Social History Main Topics    Smoking status: Never Smoker    Smokeless tobacco: Current User     Types: Chew    Alcohol use No    Drug use: No    Sexual activity: Not on file      Review of Systems   Unable to perform ROS: Mental status change     Objective:     Vital Signs (Most Recent):  Temp: 97.5 °F (36.4 °C) (07/14/18 0715)  Pulse: 83 (07/14/18 1000)  Resp: 15 (07/14/18 1000)  BP: (!) 124/55 (07/14/18 1000)  SpO2: 99 % (07/14/18 1000) Vital Signs (24h Range):  Temp:  [97.5 °F (36.4 °C)-98 °F (36.7 °C)] 97.5 °F (36.4 °C)  Pulse:  [68-97] 83  Resp:  [13-35] 15  SpO2:  [91 %-100 %] 99 %  BP: ()/(47-77) 124/55     Weight: 128.6 kg (283 lb 8.2 oz)  Body mass index is 36.4 kg/m².      Intake/Output Summary (Last 24 hours) at 07/14/18  1020  Last data filed at 07/14/18 1000   Gross per 24 hour   Intake             3947 ml   Output              600 ml   Net             3347 ml       Physical Exam   Constitutional: He appears well-developed and well-nourished. No distress.   HENT:   Head: Normocephalic and atraumatic.   Neck: Neck supple.   Cardiovascular: Normal rate and regular rhythm.    Pulmonary/Chest: Effort normal and breath sounds normal. He has no wheezes.   Abdominal: Soft. He exhibits no distension.   Musculoskeletal: He exhibits edema.   Lymphadenopathy:     He has no cervical adenopathy.   Neurological:   Unconscious, only responding to pain   Skin: Skin is warm. He is not diaphoretic. There is erythema.       Vents:       Lines/Drains/Airways     Drain                 Urethral Catheter 07/13/18 0713 1 day          Peripheral Intravenous Line                 Peripheral IV - Single Lumen 07/11/18 0628 Right Forearm 3 days         Peripheral IV - Single Lumen 07/13/18 0625 Left Forearm 1 day                Significant Labs:    CBC/Anemia Profile:    Recent Labs  Lab 07/13/18  0506 07/14/18  0423   WBC 3.16* 2.81*  2.81*   HGB 8.0* 8.2*  8.2*   HCT 25.0* 24.3*  24.3*   PLT 45* 43*  43*   * 103*  103*   RDW 20.5* 20.6*  20.6*        Chemistries:    Recent Labs  Lab 07/12/18  2215 07/13/18  0506 07/14/18  0423    144  145 148*  148*   K 3.7 3.8  3.8 3.5  3.5   * 119*  119* 121*  121*   CO2 18* 17*  17* 19*  19*   BUN 33* 33*  34* 39*  39*   CREATININE 1.7* 1.8*  1.7* 1.7*  1.7*   CALCIUM 9.6 9.4  9.4 9.4  9.4   ALBUMIN  --  2.7* 2.8*  2.8*   PROT  --  4.8* 5.0*  5.0*   BILITOT  --  1.9* 2.4*  2.4*   ALKPHOS  --  107 77  77   ALT  --  39 37  37   AST  --  50* 49*  49*   MG  --  2.5 2.5  2.5   PHOS  --  3.8 3.8  3.8       All pertinent labs within the past 24 hours have been reviewed.    Significant Imaging: I have reviewed all pertinent imaging results/findings within the past 24 hours.

## 2018-07-14 NOTE — PLAN OF CARE
Problem: Patient Care Overview  Goal: Plan of Care Review  Outcome: Ongoing (interventions implemented as appropriate)  Patient on room air SATs 100%. All VSS. Patient given lactulose enema, retained slightly, 4 bowel movements through shift. Patient on 0300 assessment was awake, and oriented only to time. Urine output 20-30cc/hr. Patient remains confused and slurs speech. Patient passed swallow study and resumed PO meds. Will continue to monitor.

## 2018-07-14 NOTE — PROGRESS NOTES
[facility]  Nephrology  Progress Note    Patient Name: Alon Adamson  MRN: [id number]  Admission Date: 6/22/2018  Hospital Length of Stay: 22 days  Attending Provider: Elan Guerra MD   Primary Care Physician: Mckinley Vides MD  Principal Problem:Acute kidney injury superimposed on chronic kidney disease    Subjective:     HPI: Mr. Adamson is 63 yr old male with decompensated alcoholic cirrhosis, CKD III and CAD admitted here on 06/22/18 admitted for bilateral LE hypervolemia and VICKY on CKD III with cr of 2.3 baseline around 1.5. Patient has multiple hospitalization in the past secondary to decompensated liver failure. Patient is currently on transplant team. During current admission patient was getting lasix and albumin intermittently for VICKY however renal function is not getting better. Patient hospital course is complicated by hepatic encephalopathy with some improvement of mentation with lactulose. Also treated for cellulitis with 7 days of vancomycin. Concern for seizures, EEG with out evidence of seizure activities. Patient had paracentesis on current admit but no evidence for SBP, only 1 lt removed. Nephrology is consulted for worsening/not improving VICKY despite lasix/albumin.     Per chart review patient has no hypotensive episodes, BP mostly above 100's. Has not received nephrotoxins such as NSAIDs/contrast media. No sever sepsis/septic shock or acute blood loss during current admit. UA with 2+ leukocytes and Hyaline casts, no wbc/rbc cast or proteinuria. Urine Na+ < 20.    Patient has no complaints when seen today. Family at bedside states his mentation has gotten better today, more lucid. Has increased stool out put as many as 10x daily at times, averages x4 daily. Appetite and enery level is poor but seems to be improving today per wife.       Interval History: no new issues overnight or this morning, sodium worsening at 152 this morning    Review of patient's allergies indicates:    Allergen Reactions    Nsaids (non-steroidal anti-inflammatory drug)      D/t to liver disease.    Tylenol [acetaminophen]      D/t liver disease.    Penicillins Other (See Comments)     Tolerated pip-tazo in 8/2016 without issue  Since childhood was told not to take it     Current Facility-Administered Medications   Medication Frequency    acetaminophen tablet 650 mg Q8H PRN    ceFEPIme injection 2 g Q12H    diphenhydrAMINE-zinc acetate 1-0.1% cream TID PRN    ergocalciferol capsule 50,000 Units Q7 Days    fluconazole tablet 200 mg Daily    heparin (porcine) injection 5,000 Units Q8H    lactulose 10 gram/15 mL solution (enema) 200 g Q6H PRN    lactulose 10 gram/15 mL solution (enema) 200 g Once    lactulose 20 gram/30 mL solution Soln 45 g TID    lactulose 20 gram/30 mL solution Soln 45 g Q6H PRN    levETIRAcetam tablet 500 mg BID    lidocaine 5 % patch 2 patch Q24H    metoclopramide HCl injection 10 mg Q6H PRN    omega-3 acid ethyl esters capsule 2 g Daily with breakfast    ondansetron disintegrating tablet 8 mg Q8H PRN    pantoprazole EC tablet 40 mg BID    rifAXIMin tablet 550 mg BID    sertraline tablet 50 mg Daily    sodium chloride 0.9% flush 3 mL PRN       Objective:     Vital Signs (Most Recent):  Temp: 97.4 °F (36.3 °C) (07/14/18 1515)  Pulse: 90 (07/14/18 1700)  Resp: 17 (07/14/18 1700)  BP: (!) 117/56 (07/14/18 1700)  SpO2: 100 % (07/14/18 1700)  O2 Device (Oxygen Therapy): room air (07/14/18 1515) Vital Signs (24h Range):  Temp:  [96.1 °F (35.6 °C)-98 °F (36.7 °C)] 97.4 °F (36.3 °C)  Pulse:  [77-97] 90  Resp:  [14-26] 17  SpO2:  [91 %-100 %] 100 %  BP: (105-131)/(47-77) 117/56     Weight: 128.6 kg (283 lb 8.2 oz) (07/13/18 0645)  Body mass index is 36.4 kg/m².  Body surface area is 2.59 meters squared.    I/O last 3 completed shifts:  In: 5595.3 [P.O.:180; I.V.:4965.3; IV Piggyback:450]  Out: 755 [Urine:755]    Physical Exam   HENT:   Head: Atraumatic.   Neck: No JVD present.    Cardiovascular: Normal rate and regular rhythm.  Exam reveals no friction rub.    Pulmonary/Chest: Effort normal and breath sounds normal.   Abdominal: Soft. He exhibits no distension.   Neurological: He is alert.   Skin: Skin is warm.         Assessment/Plan:     * Acute kidney injury superimposed on chronic kidney disease    62 yo male with acute decompensated liver failure secondary to alcohol cirrhosis now with sCr trending up/with out improvement nephrology consulted for VICKY. Etiology most likely pre-renal. Also considered HRS but less likely given optimal BP and renal function labs.     -sCr at baseline 1.4-1.5, sCr stable/slightly improved this morning at 1.7 today  -no need for RRT today, continue to monitor serial RFPs and strict Is & Os          Hypernatremia    -Hypernatremia, worsening at 152 today, free water deficit is around 4-6L  -recommend replacing gradually with D5W            Thank you for your consult. I will follow-up with patient. Please contact us if you have any additional questions.    Elan Magana MD  Nephrology  [facility]

## 2018-07-14 NOTE — SUBJECTIVE & OBJECTIVE
Interval History/Significant Events: AMS improved, still disoriented and not very communicative but alert and smiles, per family at bedside patient is getting to baseline, ammonia improved with lactulose.  Temperature stable.  Cr and Na continuing to improve.  BP improved with fluids, not on pressors.        Objective:     Vital Signs (Most Recent):  Temp: 97.5 °F (36.4 °C) (07/14/18 0715)  Pulse: 83 (07/14/18 1000)  Resp: 15 (07/14/18 1000)  BP: (!) 124/55 (07/14/18 1000)  SpO2: 99 % (07/14/18 1000) Vital Signs (24h Range):  Temp:  [97.5 °F (36.4 °C)-98 °F (36.7 °C)] 97.5 °F (36.4 °C)  Pulse:  [68-97] 83  Resp:  [13-35] 15  SpO2:  [91 %-100 %] 99 %  BP: ()/(47-77) 124/55     Weight: 128.6 kg (283 lb 8.2 oz)  Body mass index is 36.4 kg/m².      Intake/Output Summary (Last 24 hours) at 07/14/18 1126  Last data filed at 07/14/18 1000   Gross per 24 hour   Intake             3947 ml   Output              600 ml   Net             3347 ml       Physical Exam   Constitutional: He appears well-developed and well-nourished. No distress.   HENT:   Head: Normocephalic and atraumatic.   Neck: Neck supple.   Cardiovascular: Normal rate and regular rhythm.    Pulmonary/Chest: Effort normal and breath sounds normal. He has no wheezes.   Abdominal: Soft. He exhibits no distension.   Musculoskeletal: He exhibits edema.   Lymphadenopathy:     He has no cervical adenopathy.   Neurological: He is alert.   Disoriented, but improving to baseline mentation   Skin: Skin is warm. He is not diaphoretic. There is erythema.       Vents:       Lines/Drains/Airways     Drain                 Urethral Catheter 07/13/18 0713 1 day          Peripheral Intravenous Line                 Peripheral IV - Single Lumen 07/11/18 0628 Right Forearm 3 days         Peripheral IV - Single Lumen 07/13/18 0625 Left Forearm 1 day                Significant Labs:    CBC/Anemia Profile:    Recent Labs  Lab 07/13/18  0506 07/14/18  0423   WBC 3.16* 2.81*   2.81*   HGB 8.0* 8.2*  8.2*   HCT 25.0* 24.3*  24.3*   PLT 45* 43*  43*   * 103*  103*   RDW 20.5* 20.6*  20.6*        Chemistries:    Recent Labs  Lab 07/12/18  2215 07/13/18  0506 07/14/18  0423    144  145 148*  148*   K 3.7 3.8  3.8 3.5  3.5   * 119*  119* 121*  121*   CO2 18* 17*  17* 19*  19*   BUN 33* 33*  34* 39*  39*   CREATININE 1.7* 1.8*  1.7* 1.7*  1.7*   CALCIUM 9.6 9.4  9.4 9.4  9.4   ALBUMIN  --  2.7* 2.8*  2.8*   PROT  --  4.8* 5.0*  5.0*   BILITOT  --  1.9* 2.4*  2.4*   ALKPHOS  --  107 77  77   ALT  --  39 37  37   AST  --  50* 49*  49*   MG  --  2.5 2.5  2.5   PHOS  --  3.8 3.8  3.8       All pertinent labs within the past 24 hours have been reviewed.    Significant Imaging:  I have reviewed all pertinent imaging results/findings within the past 24 hours.

## 2018-07-14 NOTE — SUBJECTIVE & OBJECTIVE
Interval history:  Mental status improved this morning. AAOx2. Ammonia level decreased    Scheduled Meds:   ceFEPime (MAXIPIME) IVPB  2 g Intravenous Q12H    ergocalciferol  50,000 Units Oral Q7 Days    fluconazole  200 mg Oral Daily    heparin (porcine)  5,000 Units Subcutaneous Q8H    lactulose  200 g Rectal Once    lactulose  45 g Oral TID    levETIRAcetam  500 mg Oral BID    lidocaine  2 patch Transdermal Q24H    omega-3 acid ethyl esters  2 g Oral Daily with breakfast    pantoprazole  40 mg Oral BID    rifAXImin  550 mg Oral BID    sertraline  50 mg Oral Daily     Continuous Infusions:  PRN Meds:acetaminophen, diphenhydrAMINE-zinc acetate 1-0.1%, lactulose, lactulose, metoclopramide HCl, ondansetron, sodium chloride 0.9%    Review of Systems   HENT: Negative.    Respiratory: Negative.    Cardiovascular: Negative.    Gastrointestinal: Negative.    Skin: Negative.    Neurological: Positive for weakness. Negative for seizures, facial asymmetry and numbness.     Objective:     Vital Signs (Most Recent):  Temp: 96.1 °F (35.6 °C) (07/14/18 1115)  Pulse: 80 (07/14/18 1500)  Resp: (!) 24 (07/14/18 1515)  BP: (!) 113/55 (07/14/18 1515)  SpO2: 100 % (07/14/18 1515) Vital Signs (24h Range):  Temp:  [96.1 °F (35.6 °C)-98 °F (36.7 °C)] 96.1 °F (35.6 °C)  Pulse:  [69-97] 80  Resp:  [14-35] 24  SpO2:  [91 %-100 %] 100 %  BP: ()/(47-77) 113/55     Weight: 128.6 kg (283 lb 8.2 oz)  Body mass index is 36.4 kg/m².    Intake/Output - Last 3 Shifts       07/12 0700 - 07/13 0659 07/13 0700 - 07/14 0659 07/14 0700 - 07/15 0659    P.O. 180  720    I.V. (mL/kg) 1116.7 (8.7) 3848.7 (29.9)     IV Piggyback  450     Total Intake(mL/kg) 1296.7 (10.1) 4298.7 (33.4) 720 (5.6)    Urine (mL/kg/hr) 0 (0) 725 (0.2) 255 (0.2)    Emesis/NG output 0 (0)      Other 0 (0)      Stool 0 (0)      Blood 0 (0)      Total Output 0 725 255    Net +1296.7 +3573.7 +465           Urine Occurrence 1 x      Stool Occurrence 3 x 3 x 3 x     Emesis Occurrence 0 x            Physical Exam   Constitutional: He appears well-developed and well-nourished. No distress.   HENT:   Head: Normocephalic and atraumatic.   Neck: Neck supple.   Cardiovascular: Normal rate and regular rhythm.    Pulmonary/Chest: Effort normal and breath sounds normal. He has no wheezes.   Abdominal: Soft. He exhibits no distension.   Musculoskeletal: He exhibits edema.   Lymphadenopathy:     He has no cervical adenopathy.   Neurological: He is alert.   Disoriented, but improving to baseline mentation   Skin: Skin is warm. He is not diaphoretic. There is erythema.       Laboratory:  Immunosuppressants     None        CBC:   Recent Labs  Lab 07/14/18 0423   WBC 2.81*  2.81*   RBC 2.36*  2.36*   HGB 8.2*  8.2*   HCT 24.3*  24.3*   PLT 43*  43*   *  103*   MCH 34.7*  34.7*   MCHC 33.7  33.7     CMP:   Recent Labs  Lab 07/14/18  0423 07/14/18  1343   *  182* 174*   CALCIUM 9.4  9.4 9.6   ALBUMIN 2.8*  2.8*  --    PROT 5.0*  5.0*  --    *  148* 152*   K 3.5  3.5 3.6   CO2 19*  19* 20*   *  121* 123*   BUN 39*  39* 39*   CREATININE 1.7*  1.7* 1.7*   ALKPHOS 77  77  --    ALT 37  37  --    AST 49*  49*  --    BILITOT 2.4*  2.4*  --      Coagulation:   Recent Labs  Lab 07/14/18 0423   INR 1.7*  1.7*     ABGs:   Recent Labs  Lab 07/13/18  0630   PH 7.469*   PCO2 28.8*   HCO3 20.9*   POCSATURATED 99   BE -3     Labs within the past 24 hours have been reviewed.    Diagnostic Results:  I have personally reviewed all pertinent imaging studies.

## 2018-07-14 NOTE — ASSESSMENT & PLAN NOTE
63 y.o. male with hx of ESLD secondary to EtOH cirrhosis with severe complications (hyperbilirubinemia, hypoalbuminemia, severe malnutrition, hepatic encephalopathy, thrombocytopenia, splenomegaly, ascites, hypervolemia, coaguloopathy, portal HTN), seizure disorder on Keppra, CKD3, CAD admitted to SICU 7/13/18 for worsening encephalopathy and hypothermia    Neuro:   - Sedation: None  - PO lactulose TID, PRN lactulose enemas  - Rifaximin  - Severn frequently     Pulmonary:   - Sats appropriate on RA     Cardiac:  - Drips: None  - H/o CAD     Renal:   - BUN/CR: 39/1.7 - stable  - Trend BMP     Infectious Disease:   - WBC normal   - Trend WBC  - Antibiotics: Vancomycin, Cefepime     Hematology/Oncology:  - H/H stable  - Trend CBC     Endocrine:  - SSI     Fluids/Electrolytes/Nutrition/GI:   - Renal diet  - Trend CMP  - Replace Lytes PRN      Dispo:  Continue SICU care.   Primary team: Transplant Surgery

## 2018-07-14 NOTE — PROGRESS NOTES
[facility]  Liver Transplant  Progress Note    Patient Name: Alon Adamson  MRN: [id number]  Admission Date: 6/22/2018  Hospital Length of Stay: 22 days  Code Status: Full Code  Primary Care Provider: Mckinley Vides MD    Subjective:     Interval history:  Mental status improved this morning. AAOx2. Ammonia level decreased    Scheduled Meds:   ceFEPime (MAXIPIME) IVPB  2 g Intravenous Q12H    ergocalciferol  50,000 Units Oral Q7 Days    fluconazole  200 mg Oral Daily    heparin (porcine)  5,000 Units Subcutaneous Q8H    lactulose  200 g Rectal Once    lactulose  45 g Oral TID    levETIRAcetam  500 mg Oral BID    lidocaine  2 patch Transdermal Q24H    omega-3 acid ethyl esters  2 g Oral Daily with breakfast    pantoprazole  40 mg Oral BID    rifAXImin  550 mg Oral BID    sertraline  50 mg Oral Daily     Continuous Infusions:  PRN Meds:acetaminophen, diphenhydrAMINE-zinc acetate 1-0.1%, lactulose, lactulose, metoclopramide HCl, ondansetron, sodium chloride 0.9%    Review of Systems   HENT: Negative.    Respiratory: Negative.    Cardiovascular: Negative.    Gastrointestinal: Negative.    Skin: Negative.    Neurological: Positive for weakness. Negative for seizures, facial asymmetry and numbness.     Objective:     Vital Signs (Most Recent):  Temp: 96.1 °F (35.6 °C) (07/14/18 1115)  Pulse: 80 (07/14/18 1500)  Resp: (!) 24 (07/14/18 1515)  BP: (!) 113/55 (07/14/18 1515)  SpO2: 100 % (07/14/18 1515) Vital Signs (24h Range):  Temp:  [96.1 °F (35.6 °C)-98 °F (36.7 °C)] 96.1 °F (35.6 °C)  Pulse:  [69-97] 80  Resp:  [14-35] 24  SpO2:  [91 %-100 %] 100 %  BP: ()/(47-77) 113/55     Weight: 128.6 kg (283 lb 8.2 oz)  Body mass index is 36.4 kg/m².    Intake/Output - Last 3 Shifts       07/12 0700 - 07/13 0659 07/13 0700 - 07/14 0659 07/14 0700 - 07/15 0659    P.O. 180  720    I.V. (mL/kg) 1116.7 (8.7) 3848.7 (29.9)     IV Piggyback  450     Total Intake(mL/kg) 1296.7 (10.1) 4298.7 (33.4)  720 (5.6)    Urine (mL/kg/hr) 0 (0) 725 (0.2) 255 (0.2)    Emesis/NG output 0 (0)      Other 0 (0)      Stool 0 (0)      Blood 0 (0)      Total Output 0 725 255    Net +1296.7 +3573.7 +465           Urine Occurrence 1 x      Stool Occurrence 3 x 3 x 3 x    Emesis Occurrence 0 x            Physical Exam   Constitutional: He appears well-developed and well-nourished. No distress.   HENT:   Head: Normocephalic and atraumatic.   Neck: Neck supple.   Cardiovascular: Normal rate and regular rhythm.    Pulmonary/Chest: Effort normal and breath sounds normal. He has no wheezes.   Abdominal: Soft. He exhibits no distension.   Musculoskeletal: He exhibits edema.   Lymphadenopathy:     He has no cervical adenopathy.   Neurological: He is alert.   Disoriented, but improving to baseline mentation   Skin: Skin is warm. He is not diaphoretic. There is erythema.       Laboratory:  Immunosuppressants     None        CBC:   Recent Labs  Lab 07/14/18 0423   WBC 2.81*  2.81*   RBC 2.36*  2.36*   HGB 8.2*  8.2*   HCT 24.3*  24.3*   PLT 43*  43*   *  103*   MCH 34.7*  34.7*   MCHC 33.7  33.7     CMP:   Recent Labs  Lab 07/14/18 0423 07/14/18  1343   *  182* 174*   CALCIUM 9.4  9.4 9.6   ALBUMIN 2.8*  2.8*  --    PROT 5.0*  5.0*  --    *  148* 152*   K 3.5  3.5 3.6   CO2 19*  19* 20*   *  121* 123*   BUN 39*  39* 39*   CREATININE 1.7*  1.7* 1.7*   ALKPHOS 77  77  --    ALT 37  37  --    AST 49*  49*  --    BILITOT 2.4*  2.4*  --      Coagulation:   Recent Labs  Lab 07/14/18 0423   INR 1.7*  1.7*     ABGs:   Recent Labs  Lab 07/13/18  0630   PH 7.469*   PCO2 28.8*   HCO3 20.9*   POCSATURATED 99   BE -3     Labs within the past 24 hours have been reviewed.    Diagnostic Results:  I have personally reviewed all pertinent imaging studies.    Assessment/Plan:   Alon Adamson is a 63 y.o. male     Coronary artery disease involving native coronary artery of native heart without angina  "pectoris    - Continue home ASA.  - 2D Echo 7/9 normal EF (55-60%), trivial tricuspid/mitral regurg.        Renal/   * Acute kidney injury superimposed on chronic kidney disease    - VICKY initially improving with albumin infusion but with minimal intake 6/25 and 6/26.  - dc sodium bicarb due to hypernatremia  - Holding diuretics (previously lasix 40mg daily) due to elevated Cr  - nephrology consulted due to hypervolemia and multiple electrolyte abnormalities (acidosis, hypernatremia)  - Cr improving with hydration        CKD (chronic kidney disease), stage III    - see acute kidney injury superimposed on CKD III        Hematology   Coagulopathy    - Secondary to decompensated liver disease.        Thrombocytopenia    - Secondary to splenomegaly from portal HTN- should improve with txp.  - No s/s overt bleed.        Oncology   Pancytopenia    - related to decompensated liver disease        Anemia of chronic disease    - H&H stable.  - 1 unit PRBC transfused 6/28.         Endocrine   Severe protein-calorie malnutrition    - Dietary consulted for calorie count.  - Pt eating better accord to family.  Does take boost supplement.  - "pre-hab" regimen and supplements ordered. Dc creon due to excessive diarrhea  - Patient drinking at least 2-3 boost supplements and beneprotein daily.   - If continues to have low intake, consider starting Megace.   - c/o persistent nausea, vomiting overnight with brown emesis, KUB 7/9 with scattered bowel gas.         GI   Ascites due to alcoholic cirrhosis    - Para 6/25 negative for infection   - Para 7/10 negative for infection  - holding diuretics due to dehydration/VICKY        Decompensated hepatic cirrhosis    - Placed status 7 on 6/26 for prescription drug coverage change. Re-activated 7/3, MELD 22. Listing tenuous due to frailty and malnutrition. Plan to submit MELD exception points.   - dc propanolol    MELD-Na score: 20 at 7/12/2018  5:48 AM  MELD score: 20 at 7/12/2018  5:48 " AM  Calculated from:  Serum Creatinine: 1.6 mg/dL at 7/12/2018  5:48 AM  Serum Sodium: 150 mmol/L (Rounded to 137) at 7/12/2018  5:48 AM  Total Bilirubin: 2.2 mg/dL at 7/12/2018  5:48 AM  INR(ratio): 1.7 at 7/12/2018  5:48 AM  Age: 63 years        Hepatic encephalopathy    63 y.o. male with hx of ESLD secondary to EtOH cirrhosis with severe complications (hyperbilirubinemia, hypoalbuminemia, severe malnutrition, hepatic encephalopathy, thrombocytopenia, splenomegaly, ascites, hypervolemia, coaguloopathy, portal HTN), seizure disorder on Keppra, CKD3, CAD admitted to SICU 7/13/18 for worsening encephalopathy and hypothermia    Neuro:   - Sedation: None  - PO lactulose TID, PRN lactulose enemas  - Rifaximin  - [place] frequently     Pulmonary:   - Sats appropriate on RA     Cardiac:  - Drips: None  - H/o CAD     Renal:   - BUN/CR: 39/1.7 - stable  - Trend BMP     Infectious Disease:   - WBC normal   - Trend WBC  - Antibiotics: Vancomycin, Cefepime     Hematology/Oncology:  - H/H stable  - Trend CBC     Endocrine:  - SSI     Fluids/Electrolytes/Nutrition/GI:   - Renal diet  - Trend CMP  - Replace Lytes PRN      Dispo:  Continue SICU care.   Primary team: Transplant Surgery          Other   Physical deconditioning    - PT/OT consulted.  - encourage OOBTC for several hours each day, walking with walker with PT        Hypoalbuminemia due to protein-calorie malnutrition    - see severe protein-calorie malnutrition        Bilateral edema of lower extremity    - completed vanc x 7 days for BLE cellulitis  - 2D Echo 7/9 normal EF (55-60%), trivial tricuspid/mitral regurg.  - holding diuretics for elevated Cr/dehydration            The patients clinical status was discussed at multidisplinary rounds, involving transplant surgery, transplant medicine, pharmacy, nursing, nutrition, and social work.    Rudy Grimm MD  Liver Transplant  [facility]

## 2018-07-14 NOTE — NURSING
Pt lethargic and unable to arouse.  Administered PRN lactulose enema.  Pt had 1 large BM shortly after.  Only arousable to painful stimuli.   Became hypotensive BP 84/49.  MD Huff notified and MD at pt bedside.  Administered 250cc NS bolus and 25g albumin.  Pt BP 100s/50s.  [place] placed.  UA and urine culture sent.  Blood culture and labs pending.  Report given to GUTIERREZ Garcia. Pt ordered to be transferred to SICU when bed becomes available.

## 2018-07-14 NOTE — PROGRESS NOTES
MD notified of drop in U/O and temp of 96.1. Warming Pt with heating blanket. No new orders given. Will continue to monitor.

## 2018-07-14 NOTE — H&P
[facility]  Critical Care - Surgery  History & Physical    Patient Name: Alon Adamson  MRN: [id number]  Admission Date: 6/22/2018  Code Status: Full Code  Attending Physician: Elan Guerra MD   Primary Care Provider: Mckinley Vides MD   Principal Problem: Acute kidney injury superimposed on chronic kidney disease    Subjective:     HPI:  Alon Adamson is a 63 y.o. male with hx of ESLD secondary to EtOH cirrhosis with severe complications (hyperbilirubinemia, hypoalbuminemia, severe malnutrition, hepatic encephalopathy, thrombocytopenia, splenomegaly, ascites, hypervolemia, coaguloopathy, portal HTN), seizure disorder on Keppra, CKD3, CAD.    Patient was placed on transplant list on 6/1/2018.  He has a recent hx of transfer admission on 6/9 for severe hepatic encephalopathy requiring intubation which resolved.    Patient was admitted on 6/22/2018 from clinic for significant hypervolemia, weeping from BLE, and VICKY on CKD3 on routine labs.  His MELD increased from 22 to 26 per labs- coordinator notified and MELD updated.  Cr elevated to 2.3 from baseline of 1.4.  Also with worsening ascites.  Pt reported increased pruritis, generalized fatigue and weakness.  He presented in wheelchair.  ROS otherwise negative.      Hospital/ICU Course:  Per tranplant, Patient is listed for liver transplant, placed on internal hold 6/25 for HE then status 7 on 6/26 due to change in prescription coverage. He was re-activated on the list 7/3, MELD 22. Pt started on IV diuretics 6/22 and continued 6/23 and 6/24.  BLE edema and ascites sign if improved with diuresis and kidney function also improved. During admission, patient had worsening encephalopathy and asterixis prompting infectious work up, all blood and urine cultures obtained during admission with NGTD. Intermittently, encephalopathy severe enough to warrant lactulose enemas. CT head obtained and negative. Paracentesis 6/25 negative for peritonitis  per cell count and U/A negative. Pt also with concern for BLE cellulitis- upper legs bright red with lower BLE still with dark appearance of venous stasis. Placed on vancomycin with improvement in BLE redness 6/26. Vanc continued for 7 days (ended 7/1). His HE waxed and waned for several days requiring lactulose enemas. Pt eating minimally during period of encephalopathy requiring gentle hydration with IVF 6/26. Of note, taco placement attempted 6/26 but unable secondary to agitation upon insertion prompting self resolving nose bleed. Micro lab called 6/26 PM with blood cx + for G+C in blood- pt continued on vanc which was started 6/25. ID consulted, suspect + blood cultures contaminant. EEG obtained 6/26 as pt with h/o serizures and negative. Pt continued on home Keppra. VICKY on admission initially improved with diuresis, but Cr sakina intermittently during admission likely related to aggressive diuresis. Of note, with chest pain overnight 6/26-6/27 that resolved without intervention and pt reported as mild.  EKG NSR with PVC, troponin 0.1 in setting of hypervolemia + VICKY.  Normal dobutamine stress 1/2018.  Cardiology consulted and felt not ACS, no need for further workup other than continue to treat current risk factors for CAD with ASA (pt already on ASA and statin as outpt) + BB for portal hypertension which was started. Repeat paracentesis 7/10, 4.8L off, no SBP. Hypernatremia noted 7/9, nephrology consulted to aid in management. Hypernatremia attributed to dehydration from frequent BM's, diuretics were held, d/c'd creon due to excessive diarrhea, and patient was treated with increasing po free water and IVF. Echo 7/9 with normal EF, no wall abnormality, mild tricuspid/mitral regurg.     Overnight from 7/12 to 7/13, pt became hypothermic to 91.2 corrected with warming blankets, experienced worsening encephalopathy, hypotensive to 80/40.  Transferred to SICU.  Corrected with albumin 5% bolus.  Given additional  lactulose.       Past Medical History:   Diagnosis Date    Anticoagulant long-term use     Arthritis     Back pain     Cellulitis     Cirrhosis     Coronary artery disease     DM (diabetes mellitus)     Hearing loss     right ear    Hepatic encephalopathy     Hypertension     diagnosed today (11/25/2015) and prescribed toprol    Leg edema     Nephrolithiasis     JACK (obstructive sleep apnea)     Seizures     Splenomegaly        Past Surgical History:   Procedure Laterality Date    APPENDECTOMY      Open    BACK SURGERY      CHOLECYSTECTOMY      laprascopic    COLONOSCOPY N/A 1/25/2018    Procedure: COLONOSCOPY;  Surgeon: Lashawn Myles MD;  Location: [address]);  Service: Endoscopy;  Laterality: N/A;    LUMBAR DISCECTOMY      SPLENIC ARTERY EMBOLIZATION  04/08/2016    Dr Taveras    TONSILLECTOMY         Review of patient's allergies indicates:   Allergen Reactions    Nsaids (non-steroidal anti-inflammatory drug)      D/t to liver disease.    Tylenol [acetaminophen]      D/t liver disease.    Penicillins Other (See Comments)     Tolerated pip-tazo in 8/2016 without issue  Since childhood was told not to take it       Family History     None        Social History Main Topics    Smoking status: Never Smoker    Smokeless tobacco: Current User     Types: Chew    Alcohol use No    Drug use: No    Sexual activity: Not on file      Review of Systems   Unable to perform ROS: Mental status change     Objective:     Vital Signs (Most Recent):  Temp: 97.5 °F (36.4 °C) (07/14/18 0715)  Pulse: 83 (07/14/18 1000)  Resp: 15 (07/14/18 1000)  BP: (!) 124/55 (07/14/18 1000)  SpO2: 99 % (07/14/18 1000) Vital Signs (24h Range):  Temp:  [97.5 °F (36.4 °C)-98 °F (36.7 °C)] 97.5 °F (36.4 °C)  Pulse:  [68-97] 83  Resp:  [13-35] 15  SpO2:  [91 %-100 %] 99 %  BP: ()/(47-77) 124/55     Weight: 128.6 kg (283 lb 8.2 oz)  Body mass index is 36.4 kg/m².      Intake/Output Summary (Last 24 hours) at  07/14/18 1020  Last data filed at 07/14/18 1000   Gross per 24 hour   Intake             3947 ml   Output              600 ml   Net             3347 ml       Physical Exam   Constitutional: He appears well-developed and well-nourished. No distress.   HENT:   Head: Normocephalic and atraumatic.   Neck: Neck supple.   Cardiovascular: Normal rate and regular rhythm.    Pulmonary/Chest: Effort normal and breath sounds normal. He has no wheezes.   Abdominal: Soft. He exhibits no distension.   Musculoskeletal: He exhibits edema.   Lymphadenopathy:     He has no cervical adenopathy.   Neurological:   Unconscious, only responding to pain   Skin: Skin is warm. He is not diaphoretic. There is erythema.       Vents:       Lines/Drains/Airways     Drain                 Urethral Catheter 07/13/18 0713 1 day          Peripheral Intravenous Line                 Peripheral IV - Single Lumen 07/11/18 0628 Right Forearm 3 days         Peripheral IV - Single Lumen 07/13/18 0625 Left Forearm 1 day                Significant Labs:    CBC/Anemia Profile:    Recent Labs  Lab 07/13/18  0506 07/14/18  0423   WBC 3.16* 2.81*  2.81*   HGB 8.0* 8.2*  8.2*   HCT 25.0* 24.3*  24.3*   PLT 45* 43*  43*   * 103*  103*   RDW 20.5* 20.6*  20.6*        Chemistries:    Recent Labs  Lab 07/12/18  2215 07/13/18  0506 07/14/18  0423    144  145 148*  148*   K 3.7 3.8  3.8 3.5  3.5   * 119*  119* 121*  121*   CO2 18* 17*  17* 19*  19*   BUN 33* 33*  34* 39*  39*   CREATININE 1.7* 1.8*  1.7* 1.7*  1.7*   CALCIUM 9.6 9.4  9.4 9.4  9.4   ALBUMIN  --  2.7* 2.8*  2.8*   PROT  --  4.8* 5.0*  5.0*   BILITOT  --  1.9* 2.4*  2.4*   ALKPHOS  --  107 77  77   ALT  --  39 37  37   AST  --  50* 49*  49*   MG  --  2.5 2.5  2.5   PHOS  --  3.8 3.8  3.8       All pertinent labs within the past 24 hours have been reviewed.    Significant Imaging: I have reviewed all pertinent imaging results/findings within the past 24  hours.    Assessment/Plan:     Hepatic encephalopathy    Activity  Plan: PT/OT consulted; encourage OOBTC for several hours each day, walking with walker with PT    Neuro  - Oral Keppra transitioned to IV as pt not able to swallow 6/26.  - Resumed oral Keppra since mentation improved.  - EEG 6/26 negative.  - Monitor ammonia for hepatic encephalopathy, neuro checks.      Cardiovascular  - h/o CAD  - Continue home ASA.  - 2D Echo 7/9 normal EF (55-60%), trivial tricuspid/mitral regurg    Respiratory:  -no acute issues, some respiratory alkalosis from hyperventilation, satting well     Renal:  -VICKY on CKD, hypervolemia, and hypernatremia: related to dehydration from GI loss with lactulose and paracentesis. nephro following, continue D5, bicarb halted due to hypernatremia.  Encourage PO free water.  -Monitor BUN/Cr, Cr stable at 1.8, previous baseline 1.4-1.5    Liver and Pancreas  -coagulopathy secondary to decompensated liver disease  -thrombocytopenia secondary to splenomegaly from portal HTN, no acute bleeding.  -paracentesis 6/25 and 7/10 negative for infection  -lactulose enema    GI  - cholestatic pruritis: cholestyramine has previously been ineffective  -severe protein calorie malnutrition, Boost and beneprotein ordered, consider megace       ID  -Vanc/cefepime started for sepsis signs and symptoms  -BCx drawn 7/13    Heme  - Secondary to splenomegaly from portal HTN- should improve with txp.  - No s/s overt bleed.    Dispo  -patient's hypotension, AMS, hypothermia improving  -Continue SICU care  -Primary service: Transplant Surgery           Critical care was time spent personally by me on the following activities: development of treatment plan with patient or surrogate and bedside caregivers, discussions with consultants, evaluation of patient's response to treatment, examination of patient, ordering and performing treatments and interventions, ordering and review of laboratory studies, ordering and review of  radiographic studies, pulse oximetry, re-evaluation of patient's condition.  This critical care time did not overlap with that of any other provider or involve time for any procedures.     Ernesto Fraire MD  Critical Care - Surgery  [facility]

## 2018-07-14 NOTE — ASSESSMENT & PLAN NOTE
-Hypernatremia, worsening at 152 today, free water deficit is around 4-6L  -recommend replacing gradually with D5W

## 2018-07-14 NOTE — ASSESSMENT & PLAN NOTE
Activity  Plan: PT/OT consulted; encourage OOBTC for several hours each day, walking with walker with PT    Neuro  - Oral Keppra transitioned to IV as pt not able to swallow 6/26.  - Resumed oral Keppra since mentation improved.  - EEG 6/26 negative.  - Hepatic encephalopathy and ammonia improving with lactulose.      Cardiovascular  - h/o CAD  - Continue home ASA.  - 2D Echo 7/9 normal EF (55-60%), trivial tricuspid/mitral regurg    Respiratory:  -no acute issues, satting well, improved RR    Renal:  -VICKY on CKD, hypervolemia, and hypernatremia: related to dehydration from GI loss with lactulose and paracentesis. nephro following, continue D5, bicarb halted due to hypernatremia.  Encourage PO free water.  -Monitor BUN/Cr, Cr stable at 1.7, previous baseline 1.4-1.5  -UO 20-30mL/hr, continue to monitor, appreciate nephro    Liver and Pancreas  -coagulopathy secondary to decompensated liver disease  -thrombocytopenia secondary to splenomegaly from portal HTN, no acute bleeding.  -paracentesis 6/25 and 7/10 negative for infection  -lactulose enema    GI  - cholestatic pruritis: cholestyramine has previously been ineffective  -severe protein calorie malnutrition, Boost and beneprotein ordered, consider megace       ID  -Vanc/cefepime started 7/13 for sepsis signs and symptoms  -BCx drawn 7/13  -Fluconazole added 7/14 to expand coverage    Heme  - Secondary to splenomegaly from portal HTN- should improve with txp.  - No s/s overt bleed.    Dispo  -patient's hypotension, AMS, hypothermia improved  -Continue SICU care  -Primary service: Transplant Surgery

## 2018-07-14 NOTE — SUBJECTIVE & OBJECTIVE
Interval History: no new issues overnight or this morning, sodium worsening at 152 this morning    Review of patient's allergies indicates:   Allergen Reactions    Nsaids (non-steroidal anti-inflammatory drug)      D/t to liver disease.    Tylenol [acetaminophen]      D/t liver disease.    Penicillins Other (See Comments)     Tolerated pip-tazo in 8/2016 without issue  Since childhood was told not to take it     Current Facility-Administered Medications   Medication Frequency    acetaminophen tablet 650 mg Q8H PRN    ceFEPIme injection 2 g Q12H    diphenhydrAMINE-zinc acetate 1-0.1% cream TID PRN    ergocalciferol capsule 50,000 Units Q7 Days    fluconazole tablet 200 mg Daily    heparin (porcine) injection 5,000 Units Q8H    lactulose 10 gram/15 mL solution (enema) 200 g Q6H PRN    lactulose 10 gram/15 mL solution (enema) 200 g Once    lactulose 20 gram/30 mL solution Soln 45 g TID    lactulose 20 gram/30 mL solution Soln 45 g Q6H PRN    levETIRAcetam tablet 500 mg BID    lidocaine 5 % patch 2 patch Q24H    metoclopramide HCl injection 10 mg Q6H PRN    omega-3 acid ethyl esters capsule 2 g Daily with breakfast    ondansetron disintegrating tablet 8 mg Q8H PRN    pantoprazole EC tablet 40 mg BID    rifAXIMin tablet 550 mg BID    sertraline tablet 50 mg Daily    sodium chloride 0.9% flush 3 mL PRN       Objective:     Vital Signs (Most Recent):  Temp: 97.4 °F (36.3 °C) (07/14/18 1515)  Pulse: 90 (07/14/18 1700)  Resp: 17 (07/14/18 1700)  BP: (!) 117/56 (07/14/18 1700)  SpO2: 100 % (07/14/18 1700)  O2 Device (Oxygen Therapy): room air (07/14/18 1515) Vital Signs (24h Range):  Temp:  [96.1 °F (35.6 °C)-98 °F (36.7 °C)] 97.4 °F (36.3 °C)  Pulse:  [77-97] 90  Resp:  [14-26] 17  SpO2:  [91 %-100 %] 100 %  BP: (105-131)/(47-77) 117/56     Weight: 128.6 kg (283 lb 8.2 oz) (07/13/18 6099)  Body mass index is 36.4 kg/m².  Body surface area is 2.59 meters squared.    I/O last 3 completed shifts:  In:  5595.3 [P.O.:180; I.V.:4965.3; IV Piggyback:450]  Out: 755 [Urine:755]    Physical Exam   HENT:   Head: Atraumatic.   Neck: No JVD present.   Cardiovascular: Normal rate and regular rhythm.  Exam reveals no friction rub.    Pulmonary/Chest: Effort normal and breath sounds normal.   Abdominal: Soft. He exhibits no distension.   Neurological: He is alert.   Skin: Skin is warm.

## 2018-07-14 NOTE — PLAN OF CARE
Problem: Patient Care Overview  Goal: Plan of Care Review  Outcome: Ongoing (interventions implemented as appropriate)  POC reviewed with pt and family Pt O2 sats stable on RA. Pt is disoriented to time and situation. Pt has been lethargic, going in and out of sleep. Pt eating and drinking minimally with family encouragement. BS are diminished. Bowel sounds are active. Pt skin is dry, flacky, and severly edematous. Upper extremities and lower extremities elevated. Heel boots on. Pt turned Q2 and for comfort. Pt had multiple BMs today. BMS applied for lactulose enema, but was removed accidentally by pt. Pts U/O has been red and has decreased for the last several hours. MD notified. Heating blanket applied for low temp this morning of 96.1. VSS. Family at bedside. Will continue to monitor.

## 2018-07-14 NOTE — ASSESSMENT & PLAN NOTE
Activity  Plan: PT/OT consulted; encourage OOBTC for several hours each day, walking with walker with PT    Neuro  - Oral Keppra transitioned to IV as pt not able to swallow 6/26.  - Resumed oral Keppra since mentation improved.  - EEG 6/26 negative.  - Monitor ammonia for hepatic encephalopathy, neuro checks.      Cardiovascular  - h/o CAD  - Continue home ASA.  - 2D Echo 7/9 normal EF (55-60%), trivial tricuspid/mitral regurg    Respiratory:  -no acute issues, some respiratory alkalosis from hyperventilation, satting well     Renal:  -VICKY on CKD, hypervolemia, and hypernatremia: related to dehydration from GI loss with lactulose and paracentesis. nephro following, continue D5, bicarb halted due to hypernatremia.  Encourage PO free water.  -Monitor BUN/Cr, Cr stable at 1.8, previous baseline 1.4-1.5    Liver and Pancreas  -coagulopathy secondary to decompensated liver disease  -thrombocytopenia secondary to splenomegaly from portal HTN, no acute bleeding.  -paracentesis 6/25 and 7/10 negative for infection  -lactulose enema    GI  - cholestatic pruritis: cholestyramine has previously been ineffective  -severe protein calorie malnutrition, Boost and beneprotein ordered, consider megace       ID  -Vanc/cefepime started for sepsis signs and symptoms  -BCx drawn 7/13    Heme  - Secondary to splenomegaly from portal HTN- should improve with txp.  - No s/s overt bleed.    Dispo  -patient's hypotension, AMS, hypothermia improving  -Continue SICU care  -Primary service: Transplant Surgery

## 2018-07-15 LAB
ALBUMIN SERPL BCP-MCNC: 2.3 G/DL
ALP SERPL-CCNC: 89 U/L
ALT SERPL W/O P-5'-P-CCNC: 33 U/L
ANION GAP SERPL CALC-SCNC: 8 MMOL/L
ANION GAP SERPL CALC-SCNC: 9 MMOL/L
ANISOCYTOSIS BLD QL SMEAR: SLIGHT
AST SERPL-CCNC: 44 U/L
BASOPHILS # BLD AUTO: 0.03 K/UL
BASOPHILS NFR BLD: 0.9 %
BILIRUB SERPL-MCNC: 2.1 MG/DL
BUN SERPL-MCNC: 41 MG/DL
BUN SERPL-MCNC: 41 MG/DL
CALCIUM SERPL-MCNC: 9.2 MG/DL
CALCIUM SERPL-MCNC: 9.3 MG/DL
CHLORIDE SERPL-SCNC: 123 MMOL/L
CHLORIDE SERPL-SCNC: 124 MMOL/L
CO2 SERPL-SCNC: 17 MMOL/L
CO2 SERPL-SCNC: 18 MMOL/L
CREAT SERPL-MCNC: 1.7 MG/DL
CREAT SERPL-MCNC: 1.8 MG/DL
DIFFERENTIAL METHOD: ABNORMAL
EOSINOPHIL # BLD AUTO: 0.3 K/UL
EOSINOPHIL NFR BLD: 8.9 %
ERYTHROCYTE [DISTWIDTH] IN BLOOD BY AUTOMATED COUNT: 20.9 %
EST. GFR  (AFRICAN AMERICAN): 45.6 ML/MIN/1.73 M^2
EST. GFR  (AFRICAN AMERICAN): 48.9 ML/MIN/1.73 M^2
EST. GFR  (NON AFRICAN AMERICAN): 39.5 ML/MIN/1.73 M^2
EST. GFR  (NON AFRICAN AMERICAN): 42.3 ML/MIN/1.73 M^2
GLUCOSE SERPL-MCNC: 144 MG/DL
GLUCOSE SERPL-MCNC: 185 MG/DL
HCT VFR BLD AUTO: 22.8 %
HGB BLD-MCNC: 7.6 G/DL
IMM GRANULOCYTES # BLD AUTO: 0 K/UL
IMM GRANULOCYTES NFR BLD AUTO: 0 %
INR PPP: 1.8
LYMPHOCYTES # BLD AUTO: 1.2 K/UL
LYMPHOCYTES NFR BLD: 33.1 %
MAGNESIUM SERPL-MCNC: 2.4 MG/DL
MCH RBC QN AUTO: 34.9 PG
MCHC RBC AUTO-ENTMCNC: 33.3 G/DL
MCV RBC AUTO: 105 FL
MONOCYTES # BLD AUTO: 0.6 K/UL
MONOCYTES NFR BLD: 18.3 %
NEUTROPHILS # BLD AUTO: 1.4 K/UL
NEUTROPHILS NFR BLD: 38.8 %
NRBC BLD-RTO: 0 /100 WBC
PHOSPHATE SERPL-MCNC: 4.2 MG/DL
PLATELET # BLD AUTO: 36 K/UL
PLATELET BLD QL SMEAR: ABNORMAL
PMV BLD AUTO: 13.3 FL
POIKILOCYTOSIS BLD QL SMEAR: SLIGHT
POTASSIUM SERPL-SCNC: 3.4 MMOL/L
POTASSIUM SERPL-SCNC: 4.8 MMOL/L
PROT SERPL-MCNC: 4.3 G/DL
PROTHROMBIN TIME: 17.3 SEC
RBC # BLD AUTO: 2.18 M/UL
SODIUM SERPL-SCNC: 148 MMOL/L
SODIUM SERPL-SCNC: 151 MMOL/L
VANCOMYCIN SERPL-MCNC: 8.7 UG/ML
WBC # BLD AUTO: 3.5 K/UL

## 2018-07-15 PROCEDURE — 25000003 PHARM REV CODE 250: Mod: NTX | Performed by: PHYSICIAN ASSISTANT

## 2018-07-15 PROCEDURE — 63600175 PHARM REV CODE 636 W HCPCS: Mod: NTX | Performed by: STUDENT IN AN ORGANIZED HEALTH CARE EDUCATION/TRAINING PROGRAM

## 2018-07-15 PROCEDURE — 25000003 PHARM REV CODE 250: Mod: NTX | Performed by: NURSE PRACTITIONER

## 2018-07-15 PROCEDURE — 84100 ASSAY OF PHOSPHORUS: CPT | Mod: NTX

## 2018-07-15 PROCEDURE — 80202 ASSAY OF VANCOMYCIN: CPT | Mod: NTX

## 2018-07-15 PROCEDURE — 80053 COMPREHEN METABOLIC PANEL: CPT | Mod: NTX

## 2018-07-15 PROCEDURE — 99233 SBSQ HOSP IP/OBS HIGH 50: CPT | Mod: NTX,,, | Performed by: SURGERY

## 2018-07-15 PROCEDURE — 83735 ASSAY OF MAGNESIUM: CPT | Mod: NTX

## 2018-07-15 PROCEDURE — 80048 BASIC METABOLIC PNL TOTAL CA: CPT | Mod: NTX

## 2018-07-15 PROCEDURE — 25000003 PHARM REV CODE 250: Mod: NTX | Performed by: STUDENT IN AN ORGANIZED HEALTH CARE EDUCATION/TRAINING PROGRAM

## 2018-07-15 PROCEDURE — 94761 N-INVAS EAR/PLS OXIMETRY MLT: CPT | Mod: NTX

## 2018-07-15 PROCEDURE — 63600175 PHARM REV CODE 636 W HCPCS: Mod: NTX | Performed by: PHYSICIAN ASSISTANT

## 2018-07-15 PROCEDURE — 25000003 PHARM REV CODE 250: Mod: NTX | Performed by: INTERNAL MEDICINE

## 2018-07-15 PROCEDURE — 85025 COMPLETE CBC W/AUTO DIFF WBC: CPT | Mod: NTX

## 2018-07-15 PROCEDURE — 20600001 HC STEP DOWN PRIVATE ROOM: Mod: NTX

## 2018-07-15 PROCEDURE — 85610 PROTHROMBIN TIME: CPT | Mod: NTX

## 2018-07-15 RX ORDER — SPIRONOLACTONE 50 MG/1
200 TABLET, FILM COATED ORAL 2 TIMES DAILY
Status: DISCONTINUED | OUTPATIENT
Start: 2018-07-15 | End: 2018-07-17

## 2018-07-15 RX ORDER — LACTULOSE 10 G/15ML
40 SOLUTION ORAL; RECTAL 3 TIMES DAILY
Status: DISCONTINUED | OUTPATIENT
Start: 2018-07-15 | End: 2018-07-15

## 2018-07-15 RX ORDER — FUROSEMIDE 80 MG/1
80 TABLET ORAL 2 TIMES DAILY
Status: DISCONTINUED | OUTPATIENT
Start: 2018-07-15 | End: 2018-07-17

## 2018-07-15 RX ORDER — ONDANSETRON 4 MG/1
4 TABLET, FILM COATED ORAL EVERY 8 HOURS PRN
Status: DISCONTINUED | OUTPATIENT
Start: 2018-07-15 | End: 2018-07-22

## 2018-07-15 RX ORDER — DOCUSATE SODIUM 100 MG/1
100 CAPSULE, LIQUID FILLED ORAL DAILY
Status: DISCONTINUED | OUTPATIENT
Start: 2018-07-16 | End: 2018-08-17 | Stop reason: HOSPADM

## 2018-07-15 RX ORDER — ASCORBIC ACID 500 MG
1000 TABLET ORAL DAILY
Status: DISCONTINUED | OUTPATIENT
Start: 2018-07-16 | End: 2018-07-31

## 2018-07-15 RX ORDER — TRAMADOL HYDROCHLORIDE 50 MG/1
50 TABLET ORAL EVERY 6 HOURS PRN
Status: DISCONTINUED | OUTPATIENT
Start: 2018-07-15 | End: 2018-07-20

## 2018-07-15 RX ORDER — ASPIRIN 81 MG/1
81 TABLET ORAL DAILY
Status: DISCONTINUED | OUTPATIENT
Start: 2018-07-16 | End: 2018-07-20

## 2018-07-15 RX ORDER — ATORVASTATIN CALCIUM 20 MG/1
40 TABLET, FILM COATED ORAL DAILY
Status: DISCONTINUED | OUTPATIENT
Start: 2018-07-16 | End: 2018-08-17 | Stop reason: HOSPADM

## 2018-07-15 RX ORDER — SODIUM BICARBONATE 650 MG/1
650 TABLET ORAL 2 TIMES DAILY
Status: DISCONTINUED | OUTPATIENT
Start: 2018-07-15 | End: 2018-07-17

## 2018-07-15 RX ADMIN — LEVETIRACETAM 500 MG: 500 TABLET, FILM COATED ORAL at 08:07

## 2018-07-15 RX ADMIN — SERTRALINE HYDROCHLORIDE 50 MG: 50 TABLET ORAL at 08:07

## 2018-07-15 RX ADMIN — HEPARIN SODIUM 5000 UNITS: 5000 INJECTION, SOLUTION INTRAVENOUS; SUBCUTANEOUS at 09:07

## 2018-07-15 RX ADMIN — LACTULOSE 45 G: 20 SOLUTION ORAL at 04:07

## 2018-07-15 RX ADMIN — PANTOPRAZOLE SODIUM 40 MG: 40 TABLET, DELAYED RELEASE ORAL at 08:07

## 2018-07-15 RX ADMIN — RIFAXIMIN 550 MG: 550 TABLET ORAL at 09:07

## 2018-07-15 RX ADMIN — FLUCONAZOLE 200 MG: 200 TABLET ORAL at 08:07

## 2018-07-15 RX ADMIN — HEPARIN SODIUM 5000 UNITS: 5000 INJECTION, SOLUTION INTRAVENOUS; SUBCUTANEOUS at 05:07

## 2018-07-15 RX ADMIN — HEPARIN SODIUM 5000 UNITS: 5000 INJECTION, SOLUTION INTRAVENOUS; SUBCUTANEOUS at 04:07

## 2018-07-15 RX ADMIN — PANTOPRAZOLE SODIUM 40 MG: 40 TABLET, DELAYED RELEASE ORAL at 09:07

## 2018-07-15 RX ADMIN — VANCOMYCIN HYDROCHLORIDE 750 MG: 750 INJECTION, POWDER, LYOPHILIZED, FOR SOLUTION INTRAVENOUS at 09:07

## 2018-07-15 RX ADMIN — RIFAXIMIN 550 MG: 550 TABLET ORAL at 08:07

## 2018-07-15 RX ADMIN — LACTULOSE 45 G: 20 SOLUTION ORAL at 08:07

## 2018-07-15 RX ADMIN — LACTULOSE 45 G: 20 SOLUTION ORAL at 09:07

## 2018-07-15 RX ADMIN — SODIUM BICARBONATE 650 MG TABLET 650 MG: at 09:07

## 2018-07-15 RX ADMIN — LEVETIRACETAM 500 MG: 500 TABLET, FILM COATED ORAL at 09:07

## 2018-07-15 RX ADMIN — FUROSEMIDE 80 MG: 80 TABLET ORAL at 09:07

## 2018-07-15 RX ADMIN — CEFEPIME 2 G: 2 INJECTION, POWDER, FOR SOLUTION INTRAVENOUS at 09:07

## 2018-07-15 RX ADMIN — SPIRONOLACTONE 200 MG: 50 TABLET, FILM COATED ORAL at 09:07

## 2018-07-15 RX ADMIN — OMEGA-3-ACID ETHYL ESTERS 2 G: 1 CAPSULE, LIQUID FILLED ORAL at 08:07

## 2018-07-15 RX ADMIN — CEFEPIME 2 G: 2 INJECTION, POWDER, FOR SOLUTION INTRAVENOUS at 08:07

## 2018-07-15 NOTE — SUBJECTIVE & OBJECTIVE
Interval History:  Mental status improved this morning. Able to carry short conversation however still drowsy. Not orientated to situation    Scheduled Meds:   ceFEPime (MAXIPIME) IVPB  2 g Intravenous Q12H    ergocalciferol  50,000 Units Oral Q7 Days    fluconazole  200 mg Oral Daily    heparin (porcine)  5,000 Units Subcutaneous Q8H    lactulose  200 g Rectal Once    lactulose  45 g Oral TID    levETIRAcetam  500 mg Oral BID    lidocaine  2 patch Transdermal Q24H    omega-3 acid ethyl esters  2 g Oral Daily with breakfast    pantoprazole  40 mg Oral BID    rifAXImin  550 mg Oral BID    sertraline  50 mg Oral Daily    vancomycin (VANCOCIN) IVPB  750 mg Intravenous Q24H     Continuous Infusions:  PRN Meds:acetaminophen, diphenhydrAMINE-zinc acetate 1-0.1%, lactulose, lactulose, metoclopramide HCl, ondansetron, sodium chloride 0.9%    Review of Systems   HENT: Negative.    Respiratory: Negative.    Cardiovascular: Negative.    Gastrointestinal: Negative.    Skin: Negative.    Neurological: Positive for weakness. Negative for seizures, facial asymmetry and numbness.     Objective:     Vital Signs (Most Recent):  Temp: 98.8 °F (37.1 °C) (07/15/18 0715)  Pulse: 78 (07/15/18 0900)  Resp: 17 (07/15/18 0900)  BP: 135/61 (07/15/18 0900)  SpO2: 99 % (07/15/18 0900) Vital Signs (24h Range):  Temp:  [97.4 °F (36.3 °C)-98.8 °F (37.1 °C)] 98.8 °F (37.1 °C)  Pulse:  [77-92] 78  Resp:  [14-26] 17  SpO2:  [96 %-100 %] 99 %  BP: (102-138)/(47-63) 135/61     Weight: 124.1 kg (273 lb 9.5 oz)  Body mass index is 35.13 kg/m².    Intake/Output - Last 3 Shifts       07/13 0700 - 07/14 0659 07/14 0700 - 07/15 0659 07/15 0700 - 07/16 0659    P.O.  1260 960    I.V. (mL/kg) 3848.7 (29.9) 561.3 (4.5)     IV Piggyback 450      Total Intake(mL/kg) 4298.7 (33.4) 1821.3 (14.7) 960 (7.7)    Urine (mL/kg/hr) 725 (0.2) 630 (0.2) 80 (0.1)    Stool  500 (0.2)     Total Output 725 1130 80    Net +3573.7 +691.3 +880           Stool  Occurrence 3 x 6 x 1 x          Physical Exam   Constitutional: He appears well-developed and well-nourished. No distress.   HENT:   Head: Normocephalic and atraumatic.   Neck: Neck supple.   Cardiovascular: Normal rate and regular rhythm.    Pulmonary/Chest: Effort normal and breath sounds normal. He has no wheezes.   Abdominal: Soft. He exhibits no distension.   Musculoskeletal: He exhibits edema.   Lymphadenopathy:     He has no cervical adenopathy.   Neurological: He is alert.   Disoriented, but improving to baseline mentation   Skin: Skin is warm. He is not diaphoretic. There is erythema.       Laboratory:  Immunosuppressants     None        CBC:   Recent Labs  Lab 07/15/18  0436   WBC 3.50*   RBC 2.18*   HGB 7.6*   HCT 22.8*   PLT 36*   *   MCH 34.9*   MCHC 33.3     CMP:   Recent Labs  Lab 07/15/18  0436   *   CALCIUM 9.2   ALBUMIN 2.3*   PROT 4.3*   *   K 3.4*   CO2 18*   *   BUN 41*   CREATININE 1.7*   ALKPHOS 89   ALT 33   AST 44*   BILITOT 2.1*     Coagulation:   Recent Labs  Lab 07/15/18  0436   INR 1.8*     ABGs:   Recent Labs  Lab 07/14/18  2252   PH 7.410   PCO2 30.5*   HCO3 19.3*   POCSATURATED 95   BE -5     Labs within the past 24 hours have been reviewed.    Diagnostic Results:  I have personally reviewed all pertinent imaging studies.

## 2018-07-15 NOTE — ASSESSMENT & PLAN NOTE
-Hypernatremia, worsening at 151 today (152 yesterday), free water deficit is around 4-6L  -free water intake is encouraged, however if no significant headway can be made, then recommend replacing gradually with D5W

## 2018-07-15 NOTE — PLAN OF CARE
Problem: Patient Care Overview  Goal: Individualization & Mutuality  Outcome: Ongoing (interventions implemented as appropriate)  Remains drowsy however will wake to take pills and answer questions. NSR. VSS. Room air. [place] with brown/red marginal urine output. MD's aware. Continues with loose stool secondary to lactulose. Generalized edema with dry and weeping skin. Plan of care discussed with spouse/patient. Free from injury/fall.

## 2018-07-15 NOTE — SUBJECTIVE & OBJECTIVE
"Interval History: more alert this morning, sodium still elevated 151 (152 yesterday), free water intake encouraged, patients stating "I love water"    Review of patient's allergies indicates:   Allergen Reactions    Nsaids (non-steroidal anti-inflammatory drug)      D/t to liver disease.    Tylenol [acetaminophen]      D/t liver disease.    Penicillins Other (See Comments)     Tolerated pip-tazo in 8/2016 without issue  Since childhood was told not to take it     Current Facility-Administered Medications   Medication Frequency    acetaminophen tablet 650 mg Q8H PRN    ceFEPIme injection 2 g Q12H    diphenhydrAMINE-zinc acetate 1-0.1% cream TID PRN    ergocalciferol capsule 50,000 Units Q7 Days    fluconazole tablet 200 mg Daily    heparin (porcine) injection 5,000 Units Q8H    lactulose 10 gram/15 mL solution (enema) 200 g Q6H PRN    lactulose 10 gram/15 mL solution (enema) 200 g Once    lactulose 20 gram/30 mL solution Soln 45 g TID    lactulose 20 gram/30 mL solution Soln 45 g Q6H PRN    levETIRAcetam tablet 500 mg BID    lidocaine 5 % patch 2 patch Q24H    metoclopramide HCl injection 10 mg Q6H PRN    omega-3 acid ethyl esters capsule 2 g Daily with breakfast    ondansetron disintegrating tablet 8 mg Q8H PRN    pantoprazole EC tablet 40 mg BID    rifAXIMin tablet 550 mg BID    sertraline tablet 50 mg Daily    sodium chloride 0.9% flush 3 mL PRN    vancomycin 750 mg in dextrose 5 % 250 mL IVPB (ready to mix system) Q24H       Objective:     Vital Signs (Most Recent):  Temp: 98.8 °F (37.1 °C) (07/15/18 0715)  Pulse: 91 (07/15/18 0806)  Resp: 17 (07/15/18 0806)  BP: 138/63 (07/15/18 0806)  SpO2: 99 % (07/15/18 0806)  O2 Device (Oxygen Therapy): room air (07/15/18 0806) Vital Signs (24h Range):  Temp:  [96.1 °F (35.6 °C)-98.8 °F (37.1 °C)] 98.8 °F (37.1 °C)  Pulse:  [77-92] 91  Resp:  [14-26] 17  SpO2:  [96 %-100 %] 99 %  BP: (102-138)/(47-63) 138/63     Weight: 124.1 kg (273 lb 9.5 oz) " (07/15/18 0400)  Body mass index is 35.13 kg/m².  Body surface area is 2.55 meters squared.    I/O last 3 completed shifts:  In: 1941.3 [P.O.:1380; I.V.:561.3]  Out: 1430 [Urine:930; Stool:500]    Physical Exam   HENT:   Head: Atraumatic.   Neck: No JVD present.   Cardiovascular: Normal rate and regular rhythm.  Exam reveals no friction rub.    Pulmonary/Chest: Effort normal and breath sounds normal.   Abdominal: Soft. He exhibits no distension.   Musculoskeletal: He exhibits no edema.   Neurological: He is alert.   Skin: Skin is warm.

## 2018-07-15 NOTE — ASSESSMENT & PLAN NOTE
63 yr old male with decompensated alcoholic cirrhosis, CKD III and CAD. Hospitalized with HE, VICKY on CKD. S/p OLT on 7/23 with intraop CRRT. Brought to the ICU intubated, not requiring pressor support. Significant blood loss, with aggressive resuscitation intraop    Neuro  -hx seizures and HE  -keppra  -Wean sedation, attempt extubation this afternoon. If unable, propofol/fentanyl infusions    Cardiovascular  - h/o CAD  - 2D Echo 7/9 normal EF (55-60%), trivial tricuspid/mitral regurg  - Not requiring pressor support    Respiratory:  -Intubated, minimal vent settings      Recent Labs  Lab 07/23/18  1332   PH 7.324*   PCO2 42.7   PO2 336*   HCO3 22.2*   POCSATURATED 100   BE -4     Renal:  -VICKY on CKD-intraop CRRT, anuric  -Nephrology following  -[place], BUN/Cr    Liver and Pancreas  -s/p OLT 7/23  -Trend liver enzymes    GI  -NPO  -Replete lytes prn       ID  -Anti-rejection, antiretrovirals  -monitor WBC    Heme  -h/h stable, thrombocytopenia. 14 L blood loss intraop.   RBC (Units) 9   FFP (Units) 2   Cryoprecipitate (Units)  2   Platelets (Units) 2     Dispo  -ICU

## 2018-07-15 NOTE — PROGRESS NOTES
"[facility]  Nephrology  Progress Note    Patient Name: Alon Adamson  MRN: [id number]  Admission Date: 6/22/2018  Hospital Length of Stay: 23 days  Attending Provider: Elan Guerra MD   Primary Care Physician: Mckinley Vides MD  Principal Problem:Acute kidney injury superimposed on chronic kidney disease    Subjective:     HPI: Mr. Adamson is 63 yr old male with decompensated alcoholic cirrhosis, CKD III and CAD admitted here on 06/22/18 admitted for bilateral LE hypervolemia and VICKY on CKD III with cr of 2.3 baseline around 1.5. Patient has multiple hospitalization in the past secondary to decompensated liver failure. Patient is currently on transplant team. During current admission patient was getting lasix and albumin intermittently for VICKY however renal function is not getting better. Patient hospital course is complicated by hepatic encephalopathy with some improvement of mentation with lactulose. Also treated for cellulitis with 7 days of vancomycin. Concern for seizures, EEG with out evidence of seizure activities. Patient had paracentesis on current admit but no evidence for SBP, only 1 lt removed. Nephrology is consulted for worsening/not improving VICKY despite lasix/albumin.     Per chart review patient has no hypotensive episodes, BP mostly above 100's. Has not received nephrotoxins such as NSAIDs/contrast media. No sever sepsis/septic shock or acute blood loss during current admit. UA with 2+ leukocytes and Hyaline casts, no wbc/rbc cast or proteinuria. Urine Na+ < 20.    Patient has no complaints when seen today. Family at bedside states his mentation has gotten better today, more lucid. Has increased stool out put as many as 10x daily at times, averages x4 daily. Appetite and enery level is poor but seems to be improving today per wife.       Interval History: more alert this morning, sodium still elevated 151 (152 yesterday), free water intake encouraged, patients stating "I love " "water"    Review of patient's allergies indicates:   Allergen Reactions    Nsaids (non-steroidal anti-inflammatory drug)      D/t to liver disease.    Tylenol [acetaminophen]      D/t liver disease.    Penicillins Other (See Comments)     Tolerated pip-tazo in 8/2016 without issue  Since childhood was told not to take it     Current Facility-Administered Medications   Medication Frequency    acetaminophen tablet 650 mg Q8H PRN    ceFEPIme injection 2 g Q12H    diphenhydrAMINE-zinc acetate 1-0.1% cream TID PRN    ergocalciferol capsule 50,000 Units Q7 Days    fluconazole tablet 200 mg Daily    heparin (porcine) injection 5,000 Units Q8H    lactulose 10 gram/15 mL solution (enema) 200 g Q6H PRN    lactulose 10 gram/15 mL solution (enema) 200 g Once    lactulose 20 gram/30 mL solution Soln 45 g TID    lactulose 20 gram/30 mL solution Soln 45 g Q6H PRN    levETIRAcetam tablet 500 mg BID    lidocaine 5 % patch 2 patch Q24H    metoclopramide HCl injection 10 mg Q6H PRN    omega-3 acid ethyl esters capsule 2 g Daily with breakfast    ondansetron disintegrating tablet 8 mg Q8H PRN    pantoprazole EC tablet 40 mg BID    rifAXIMin tablet 550 mg BID    sertraline tablet 50 mg Daily    sodium chloride 0.9% flush 3 mL PRN    vancomycin 750 mg in dextrose 5 % 250 mL IVPB (ready to mix system) Q24H       Objective:     Vital Signs (Most Recent):  Temp: 98.8 °F (37.1 °C) (07/15/18 0715)  Pulse: 91 (07/15/18 0806)  Resp: 17 (07/15/18 0806)  BP: 138/63 (07/15/18 0806)  SpO2: 99 % (07/15/18 0806)  O2 Device (Oxygen Therapy): room air (07/15/18 0806) Vital Signs (24h Range):  Temp:  [96.1 °F (35.6 °C)-98.8 °F (37.1 °C)] 98.8 °F (37.1 °C)  Pulse:  [77-92] 91  Resp:  [14-26] 17  SpO2:  [96 %-100 %] 99 %  BP: (102-138)/(47-63) 138/63     Weight: 124.1 kg (273 lb 9.5 oz) (07/15/18 0400)  Body mass index is 35.13 kg/m².  Body surface area is 2.55 meters squared.    I/O last 3 completed shifts:  In: 1941.3 [P.O.:1380; " I.V.:561.3]  Out: 1430 [Urine:930; Stool:500]    Physical Exam   HENT:   Head: Atraumatic.   Neck: No JVD present.   Cardiovascular: Normal rate and regular rhythm.  Exam reveals no friction rub.    Pulmonary/Chest: Effort normal and breath sounds normal.   Abdominal: Soft. He exhibits no distension.   Musculoskeletal: He exhibits no edema.   Neurological: He is alert.   Skin: Skin is warm.           Assessment/Plan:     * Acute kidney injury superimposed on chronic kidney disease    62 yo male with acute decompensated liver failure secondary to alcohol cirrhosis now with sCr trending up/with out improvement nephrology consulted for VICKY. Etiology most likely pre-renal. Also considered HRS but less likely given optimal BP and renal function labs.     -sCr at baseline 1.4-1.5, sCr stable at 1.7 this morning, UOP increasing and about 30ccs per hour this morning  -no need for RRT, continue to monitor serial RFPs and strict Is & Os          Hypernatremia    -Hypernatremia, worsening at 151 today (152 yesterday), free water deficit is around 4-6L  -free water intake is encouraged, however if no significant headway can be made, then recommend replacing gradually with D5W            Thank you for your consult. I will follow-up with patient. Please contact us if you have any additional questions.    Elan Magana MD  Nephrology  [facility]

## 2018-07-15 NOTE — PROGRESS NOTES
[facility]  Critical Care - Surgery  Progress Note    Patient Name: Alon Adamson  MRN: [id number]  Admission Date: 6/22/2018  Hospital Length of Stay: 23 days  Code Status: Full Code  Attending Provider: Elan Guerra MD  Primary Care Provider: Mckinley Vides MD   Principal Problem: Acute kidney injury superimposed on chronic kidney disease    Subjective:     Hospital/ICU Course:  Per tranplant, Patient is listed for liver transplant, placed on internal hold 6/25 for HE then status 7 on 6/26 due to change in prescription coverage. He was re-activated on the list 7/3, MELD 22. Pt started on IV diuretics 6/22 and continued 6/23 and 6/24.  BLE edema and ascites sign if improved with diuresis and kidney function also improved. During admission, patient had worsening encephalopathy and asterixis prompting infectious work up, all blood and urine cultures obtained during admission with NGTD. Intermittently, encephalopathy severe enough to warrant lactulose enemas. CT head obtained and negative. Paracentesis 6/25 negative for peritonitis per cell count and U/A negative. Pt also with concern for BLE cellulitis- upper legs bright red with lower BLE still with dark appearance of venous stasis. Placed on vancomycin with improvement in BLE redness 6/26. Vanc continued for 7 days (ended 7/1). His HE waxed and waned for several days requiring lactulose enemas. Pt eating minimally during period of encephalopathy requiring gentle hydration with IVF 6/26. Of note, taco placement attempted 6/26 but unable secondary to agitation upon insertion prompting self resolving nose bleed. Micro lab called 6/26 PM with blood cx + for G+C in blood- pt continued on vanc which was started 6/25. ID consulted, suspect + blood cultures contaminant. EEG obtained 6/26 as pt with h/o serizures and negative. Pt continued on home Keppra. VICKY on admission initially improved with diuresis, but Cr sakina intermittently during admission  likely related to aggressive diuresis. Of note, with chest pain overnight 6/26-6/27 that resolved without intervention and pt reported as mild.  EKG NSR with PVC, troponin 0.1 in setting of hypervolemia + VICKY.  Normal dobutamine stress 1/2018.  Cardiology consulted and felt not ACS, no need for further workup other than continue to treat current risk factors for CAD with ASA (pt already on ASA and statin as outpt) + BB for portal hypertension which was started. Repeat paracentesis 7/10, 4.8L off, no SBP. Hypernatremia noted 7/9, nephrology consulted to aid in management. Hypernatremia attributed to dehydration from frequent BM's, diuretics were held, d/c'd creon due to excessive diarrhea, and patient was treated with increasing po free water and IVF. Echo 7/9 with normal EF, no wall abnormality, mild tricuspid/mitral regurg.     Overnight from 7/12 to 7/13, pt became hypothermic to 91.2 corrected with warming blankets, experienced worsening encephalopathy, hypotensive to 80/40.  Transferred to SICU.  Corrected with albumin 5% bolus.  Given additional lactulose.    7/14- no acute issues, ammonia level decreased, mentation improving    Interval History/Significant Events: NAEON, AFVSS.  Continues to be disoriented.  On lactulose, very edematous.    Objective:     Vital Signs (Most Recent):  Temp: 98.1 °F (36.7 °C) (07/15/18 0300)  Pulse: 89 (07/15/18 0600)  Resp: 14 (07/15/18 0600)  BP: (!) 109/51 (07/15/18 0600)  SpO2: 98 % (07/15/18 0600) Vital Signs (24h Range):  Temp:  [96.1 °F (35.6 °C)-98.1 °F (36.7 °C)] 98.1 °F (36.7 °C)  Pulse:  [77-97] 89  Resp:  [14-26] 14  SpO2:  [96 %-100 %] 98 %  BP: (102-128)/(47-77) 109/51     Weight: 124.1 kg (273 lb 9.5 oz)  Body mass index is 35.13 kg/m².      Intake/Output Summary (Last 24 hours) at 07/15/18 0613  Last data filed at 07/15/18 0600   Gross per 24 hour   Intake          1821.32 ml   Output             1130 ml   Net           691.32 ml       Physical Exam    Constitutional: He appears well-developed and well-nourished. No distress.   HENT:   Head: Normocephalic and atraumatic.   Neck: Neck supple.   Cardiovascular: Normal rate and regular rhythm.    Pulmonary/Chest: Effort normal and breath sounds normal. He has no wheezes.   Abdominal: Soft. He exhibits no distension.   Musculoskeletal: He exhibits edema.   Lymphadenopathy:     He has no cervical adenopathy.   Neurological: He is alert.   Disoriented, but improving to baseline mentation   Skin: Skin is warm. He is not diaphoretic. There is erythema.       Vents:       Lines/Drains/Airways     Drain                 Urethral Catheter 07/13/18 0713 1 day          Peripheral Intravenous Line                 Peripheral IV - Single Lumen 07/11/18 0628 Right Forearm 3 days         Peripheral IV - Single Lumen 07/13/18 0625 Left Forearm 1 day         Peripheral IV - Single Lumen 07/14/18 2215 Left Forearm less than 1 day                Significant Labs:    CBC/Anemia Profile:    Recent Labs  Lab 07/14/18  0423 07/15/18  0436   WBC 2.81*  2.81* 3.50*   HGB 8.2*  8.2* 7.6*   HCT 24.3*  24.3* 22.8*   PLT 43*  43*  --    *  103* 105*   RDW 20.6*  20.6* 20.9*        Chemistries:    Recent Labs  Lab 07/14/18  0423 07/14/18  1343 07/14/18  2320 07/15/18  0436   *  148* 152* 151* 151*   K 3.5  3.5 3.6 3.4* 3.4*   *  121* 123* 124* 124*   CO2 19*  19* 20* 18* 18*   BUN 39*  39* 39* 38* 41*   CREATININE 1.7*  1.7* 1.7* 1.6* 1.7*   CALCIUM 9.4  9.4 9.6 9.3 9.2   ALBUMIN 2.8*  2.8*  --   --  2.3*   PROT 5.0*  5.0*  --   --  4.3*   BILITOT 2.4*  2.4*  --   --  2.1*   ALKPHOS 77  77  --   --  89   ALT 37  37  --   --  33   AST 49*  49*  --   --  44*   MG 2.5  2.5  --   --  2.4   PHOS 3.8  3.8  --   --  4.2       All pertinent labs within the past 24 hours have been reviewed.    Significant Imaging:  I have reviewed all pertinent imaging results/findings within the past 24 hours.    Assessment/Plan:      Hepatic encephalopathy    Activity  Plan: PT/OT consulted; encourage OOBTC for several hours each day, walking with walker with PT    Neuro  - Oral Keppra transitioned to IV as pt not able to swallow 6/26.  - Resumed oral Keppra since mentation improved.  - EEG 6/26 negative.  - Hepatic encephalopathy and ammonia improving with lactulose.      Cardiovascular  - h/o CAD  - Continue home ASA.  - 2D Echo 7/9 normal EF (55-60%), trivial tricuspid/mitral regurg    Respiratory:  -no acute issues, satting well, improved RR    Renal:  -VICKY on CKD, hypervolemia, and hypernatremia: related to dehydration from GI loss with lactulose and paracentesis. nephro following, continue D5, bicarb halted due to hypernatremia.  Encourage PO free water.  -Monitor BUN/Cr, Cr stable at 1.7, previous baseline 1.4-1.5  -UO 20-30mL/hr, continue to monitor, appreciate nephro    Liver and Pancreas  -coagulopathy secondary to decompensated liver disease  -thrombocytopenia secondary to splenomegaly from portal HTN, no acute bleeding.  -paracentesis 6/25 and 7/10 negative for infection  -lactulose enema    GI  - cholestatic pruritis: cholestyramine has previously been ineffective  -severe protein calorie malnutrition, Boost and beneprotein ordered, consider megace       ID  -Vanc/cefepime started 7/13 for sepsis signs and symptoms  -BCx drawn 7/13  -Fluconazole added 7/14 to expand coverage    Heme  - Secondary to splenomegaly from portal HTN- should improve with txp.  - No s/s overt bleed.    Dispo  -patient's hypotension, AMS, hypothermia improved  -Stepdown today  -Primary service: Transplant Surgery           Critical care was time spent personally by me on the following activities: development of treatment plan with patient or surrogate and bedside caregivers, discussions with consultants, evaluation of patient's response to treatment, examination of patient, ordering and performing treatments and interventions, ordering and review of  laboratory studies, ordering and review of radiographic studies, pulse oximetry, re-evaluation of patient's condition.  This critical care time did not overlap with that of any other provider or involve time for any procedures.     Ernesto Fraire MD  Critical Care - Surgery  [facility]

## 2018-07-15 NOTE — SUBJECTIVE & OBJECTIVE
Interval History/Significant Events: PRISCILLA KAURVSS.  Continues to be disoriented.  On lactulose, very edematous.    Objective:     Vital Signs (Most Recent):  Temp: 98.1 °F (36.7 °C) (07/15/18 0300)  Pulse: 89 (07/15/18 0600)  Resp: 14 (07/15/18 0600)  BP: (!) 109/51 (07/15/18 0600)  SpO2: 98 % (07/15/18 0600) Vital Signs (24h Range):  Temp:  [96.1 °F (35.6 °C)-98.1 °F (36.7 °C)] 98.1 °F (36.7 °C)  Pulse:  [77-97] 89  Resp:  [14-26] 14  SpO2:  [96 %-100 %] 98 %  BP: (102-128)/(47-77) 109/51     Weight: 124.1 kg (273 lb 9.5 oz)  Body mass index is 35.13 kg/m².      Intake/Output Summary (Last 24 hours) at 07/15/18 0613  Last data filed at 07/15/18 0600   Gross per 24 hour   Intake          1821.32 ml   Output             1130 ml   Net           691.32 ml       Physical Exam   Constitutional: He appears well-developed and well-nourished. No distress.   HENT:   Head: Normocephalic and atraumatic.   Neck: Neck supple.   Cardiovascular: Normal rate and regular rhythm.    Pulmonary/Chest: Effort normal and breath sounds normal. He has no wheezes.   Abdominal: Soft. He exhibits no distension.   Musculoskeletal: He exhibits edema.   Lymphadenopathy:     He has no cervical adenopathy.   Neurological: He is alert.   Disoriented, but improving to baseline mentation   Skin: Skin is warm. He is not diaphoretic. There is erythema.       Vents:       Lines/Drains/Airways     Drain                 Urethral Catheter 07/13/18 0713 1 day          Peripheral Intravenous Line                 Peripheral IV - Single Lumen 07/11/18 0628 Right Forearm 3 days         Peripheral IV - Single Lumen 07/13/18 0625 Left Forearm 1 day         Peripheral IV - Single Lumen 07/14/18 2215 Left Forearm less than 1 day                Significant Labs:    CBC/Anemia Profile:    Recent Labs  Lab 07/14/18  0423 07/15/18  0436   WBC 2.81*  2.81* 3.50*   HGB 8.2*  8.2* 7.6*   HCT 24.3*  24.3* 22.8*   PLT 43*  43*  --    *  103* 105*   RDW 20.6*   20.6* 20.9*        Chemistries:    Recent Labs  Lab 07/14/18  0423 07/14/18  1343 07/14/18  2320 07/15/18  0436   *  148* 152* 151* 151*   K 3.5  3.5 3.6 3.4* 3.4*   *  121* 123* 124* 124*   CO2 19*  19* 20* 18* 18*   BUN 39*  39* 39* 38* 41*   CREATININE 1.7*  1.7* 1.7* 1.6* 1.7*   CALCIUM 9.4  9.4 9.6 9.3 9.2   ALBUMIN 2.8*  2.8*  --   --  2.3*   PROT 5.0*  5.0*  --   --  4.3*   BILITOT 2.4*  2.4*  --   --  2.1*   ALKPHOS 77  77  --   --  89   ALT 37  37  --   --  33   AST 49*  49*  --   --  44*   MG 2.5  2.5  --   --  2.4   PHOS 3.8  3.8  --   --  4.2       All pertinent labs within the past 24 hours have been reviewed.    Significant Imaging:  I have reviewed all pertinent imaging results/findings within the past 24 hours.

## 2018-07-15 NOTE — NURSING TRANSFER
Nursing Transfer Note      7/15/2018     Transfer To: TSU    Transfer via bed    Transfer with [place]    Transported by Nurse    Medicines sent: N/A    Chart send with patient: Yes    Notified: Mother at bedside    Patient reassessed at: 7/15/18, 1830     Upon arrival to floor: patient oriented to room, call bell in reach and bed in lowest position. Nurse at bedside. Mother with Pt. Report given to GUTIERREZ Morales.

## 2018-07-15 NOTE — ASSESSMENT & PLAN NOTE
64 yo male with acute decompensated liver failure secondary to alcohol cirrhosis now with sCr trending up/with out improvement nephrology consulted for VICKY. Etiology most likely pre-renal. Also considered HRS but less likely given optimal BP and renal function labs.     -sCr at baseline 1.4-1.5, sCr stable at 1.7 this morning, UOP increasing and about 30ccs per hour this morning  -no need for RRT, continue to monitor serial RFPs and strict Is & Os

## 2018-07-15 NOTE — PROGRESS NOTES
Consulted for IV access for frequent lab draws.  Patient has a 22G which does not draw back and is a difficult stick for lab draws.  2 failed attempts on R arm and 1 failed attempts on L arm.  Attempts with 20G angiocaths with US guidance, dressed with gauze and tape, no immediate complications.  Fourth attempt on L forearm with US guidance, vessel found with normal compressibility, no pulsatile movement noted on US, no calcification seen around vessel.  Access achieved and 20G angiocath threaded in, no pulsatile blood flow noted, attached to T-connector IV tubing and saline flush, dressed with tegaderm.  Draws back well and flushes easily.  However, the blood drawn back appears bright red and flows back at a strong rate.  ABG ordered and shown below:    Recent Labs  Lab 07/14/18  2252   PH 7.410   PCO2 30.5*   PO2 76*   HCO3 19.3*   POCSATURATED 95   BE -5     Angiocath likely in brachial artery, no signs of ischemia downstream, no immediate complications.  Explained this to family at bedside, nursing, and primary team.  This access will be kept in for lab draws.  Appropriate labeling and communications were conducted to prohibit injection of IV medication through this access.    Ernesto Fraire MD PGY3/CA2  Anesthesiology  [facility]  Pager: [phone]  Surgical Intensive Care Unit  Spectra: [phone]

## 2018-07-15 NOTE — ASSESSMENT & PLAN NOTE
63 y.o. male with hx of ESLD secondary to EtOH cirrhosis with severe complications (hyperbilirubinemia, hypoalbuminemia, severe malnutrition, hepatic encephalopathy, thrombocytopenia, splenomegaly, ascites, hypervolemia, coaguloopathy, portal HTN), seizure disorder on Keppra, CKD3, CAD admitted to SICU 7/13/18 for worsening encephalopathy and hypothermia    Neuro:   - Mental status improving  - PO lactulose TID, PRN lactulose enemas  - Rifaximin  - [place] frequently     Pulmonary:   - Sats appropriate on RA     Cardiac:  - Drips: None  - H/o CAD     Renal:   - BUN/CR: 41/1.7 - stable  - Trend BMP     Infectious Disease:   - WBC normal   - Trend WBC  - Antibiotics: Vancomycin, Cefepime  - cultures NGTD     Hematology/Oncology:  - H/H stable  - Trend CBC     Endocrine:  - SSI     Fluids/Electrolytes/Nutrition/GI:   - hypernatremia; Na+ 151  - Renal diet. Low PO intake  - Calorie count today  - Strict PO intake measurements  - Trend CMP  - Replace Lytes PRN      Dispo:  Stepdown to TSU today.   Primary team: Transplant Surgery

## 2018-07-15 NOTE — PROGRESS NOTES
[facility]  Liver Transplant  Progress Note    Patient Name: Alon Adamson  MRN: [id number]  Admission Date: 6/22/2018  Hospital Length of Stay: 23 days  Code Status: Full Code  Primary Care Provider: Mckinley Vides MD    Subjective:     Interval History:  Mental status improved this morning. Able to carry short conversation however still drowsy. Not orientated to situation    Scheduled Meds:   ceFEPime (MAXIPIME) IVPB  2 g Intravenous Q12H    ergocalciferol  50,000 Units Oral Q7 Days    fluconazole  200 mg Oral Daily    heparin (porcine)  5,000 Units Subcutaneous Q8H    lactulose  200 g Rectal Once    lactulose  45 g Oral TID    levETIRAcetam  500 mg Oral BID    lidocaine  2 patch Transdermal Q24H    omega-3 acid ethyl esters  2 g Oral Daily with breakfast    pantoprazole  40 mg Oral BID    rifAXImin  550 mg Oral BID    sertraline  50 mg Oral Daily    vancomycin (VANCOCIN) IVPB  750 mg Intravenous Q24H     Continuous Infusions:  PRN Meds:acetaminophen, diphenhydrAMINE-zinc acetate 1-0.1%, lactulose, lactulose, metoclopramide HCl, ondansetron, sodium chloride 0.9%    Review of Systems   HENT: Negative.    Respiratory: Negative.    Cardiovascular: Negative.    Gastrointestinal: Negative.    Skin: Negative.    Neurological: Positive for weakness. Negative for seizures, facial asymmetry and numbness.     Objective:     Vital Signs (Most Recent):  Temp: 98.8 °F (37.1 °C) (07/15/18 0715)  Pulse: 78 (07/15/18 0900)  Resp: 17 (07/15/18 0900)  BP: 135/61 (07/15/18 0900)  SpO2: 99 % (07/15/18 0900) Vital Signs (24h Range):  Temp:  [97.4 °F (36.3 °C)-98.8 °F (37.1 °C)] 98.8 °F (37.1 °C)  Pulse:  [77-92] 78  Resp:  [14-26] 17  SpO2:  [96 %-100 %] 99 %  BP: (102-138)/(47-63) 135/61     Weight: 124.1 kg (273 lb 9.5 oz)  Body mass index is 35.13 kg/m².    Intake/Output - Last 3 Shifts       07/13 0700 - 07/14 0659 07/14 0700 - 07/15 0659 07/15 0700 - 07/16 0659    P.O.  1260 960    I.V. (mL/kg)  3848.7 (29.9) 561.3 (4.5)     IV Piggyback 450      Total Intake(mL/kg) 4298.7 (33.4) 1821.3 (14.7) 960 (7.7)    Urine (mL/kg/hr) 725 (0.2) 630 (0.2) 80 (0.1)    Stool  500 (0.2)     Total Output 725 1130 80    Net +3573.7 +691.3 +880           Stool Occurrence 3 x 6 x 1 x          Physical Exam   Constitutional: He appears well-developed and well-nourished. No distress.   HENT:   Head: Normocephalic and atraumatic.   Neck: Neck supple.   Cardiovascular: Normal rate and regular rhythm.    Pulmonary/Chest: Effort normal and breath sounds normal. He has no wheezes.   Abdominal: Soft. He exhibits no distension.   Musculoskeletal: He exhibits edema.   Lymphadenopathy:     He has no cervical adenopathy.   Neurological: He is alert.   Disoriented, but improving to baseline mentation   Skin: Skin is warm. He is not diaphoretic. There is erythema.       Laboratory:  Immunosuppressants     None        CBC:   Recent Labs  Lab 07/15/18  0436   WBC 3.50*   RBC 2.18*   HGB 7.6*   HCT 22.8*   PLT 36*   *   MCH 34.9*   MCHC 33.3     CMP:   Recent Labs  Lab 07/15/18  0436   *   CALCIUM 9.2   ALBUMIN 2.3*   PROT 4.3*   *   K 3.4*   CO2 18*   *   BUN 41*   CREATININE 1.7*   ALKPHOS 89   ALT 33   AST 44*   BILITOT 2.1*     Coagulation:   Recent Labs  Lab 07/15/18  0436   INR 1.8*     ABGs:   Recent Labs  Lab 07/14/18  2252   PH 7.410   PCO2 30.5*   HCO3 19.3*   POCSATURATED 95   BE -5     Labs within the past 24 hours have been reviewed.    Diagnostic Results:  I have personally reviewed all pertinent imaging studies.    Assessment/Plan:   Alon Adamson is a 63 y.o. male        Hepatic encephalopathy    63 y.o. male with hx of ESLD secondary to EtOH cirrhosis with severe complications (hyperbilirubinemia, hypoalbuminemia, severe malnutrition, hepatic encephalopathy, thrombocytopenia, splenomegaly, ascites, hypervolemia, coaguloopathy, portal HTN), seizure disorder on Keppra, CKD3, CAD admitted to SICU  7/13/18 for worsening encephalopathy and hypothermia    Neuro:   - Mental status improving  - PO lactulose TID, PRN lactulose enemas  - Rifaximin  - [place] frequently     Pulmonary:   - Sats appropriate on RA     Cardiac:  - Drips: None  - H/o CAD     Renal:   - BUN/CR: 41/1.7 - stable  - Trend BMP     Infectious Disease:   - WBC normal   - Trend WBC  - Antibiotics: Vancomycin, Cefepime  - cultures NGTD     Hematology/Oncology:  - H/H stable  - Trend CBC     Endocrine:  - SSI     Fluids/Electrolytes/Nutrition/GI:   - hypernatremia; Na+ 151  - Renal diet. Low PO intake  - Calorie count today  - Strict PO intake measurements  - Trend CMP  - Replace Lytes PRN      Dispo:  Stepdown to TSU today.   Primary team: Transplant Surgery            The patients clinical status was discussed at multidisplinary rounds, involving transplant surgery, transplant medicine, pharmacy, nursing, nutrition, and social work.    Rudy Grimm MD  Liver Transplant  [facility]

## 2018-07-15 NOTE — NURSING TRANSFER
Nursing Transfer Note      7/15/2018     Transfer SICU to TSU    Transfer via bed    Transported by RN    Medicines sent: na    Chart send with patient: yes    Notified: mother at bedside    Patient reassessed at: 7/15/18 at 1830    Upon arrival to floor: oriented to room. Mother at bedside. Unable to obtain oral temperature. 96.2 axillary. 95.0 rectal. Bear huggar applied to patient.  Notified MD Sam of patient's temperature. Patient oriented to self and location. Charge RN at bedside. /62, O299%. Will continue to monitor.

## 2018-07-15 NOTE — PROGRESS NOTES
Dr. Guerra @ bedside assessing Pt. Notified of assessment, labs, U/O, and plan. New new orders given. Will continue to monitor.

## 2018-07-16 LAB
ABO + RH BLD: NORMAL
ALBUMIN SERPL BCP-MCNC: 2.3 G/DL
ALP SERPL-CCNC: 81 U/L
ALT SERPL W/O P-5'-P-CCNC: 35 U/L
ANION GAP SERPL CALC-SCNC: 8 MMOL/L
ANION GAP SERPL CALC-SCNC: 8 MMOL/L
AST SERPL-CCNC: 47 U/L
BASOPHILS # BLD AUTO: 0.05 K/UL
BASOPHILS NFR BLD: 1.3 %
BILIRUB SERPL-MCNC: 2.2 MG/DL
BLD GP AB SCN CELLS X3 SERPL QL: NORMAL
BLD PROD TYP BPU: NORMAL
BLOOD UNIT EXPIRATION DATE: NORMAL
BLOOD UNIT TYPE CODE: 6200
BLOOD UNIT TYPE: NORMAL
BUN SERPL-MCNC: 42 MG/DL
BUN SERPL-MCNC: 44 MG/DL
CALCIUM SERPL-MCNC: 8.9 MG/DL
CALCIUM SERPL-MCNC: 9.1 MG/DL
CHLORIDE SERPL-SCNC: 114 MMOL/L
CHLORIDE SERPL-SCNC: 115 MMOL/L
CO2 SERPL-SCNC: 18 MMOL/L
CO2 SERPL-SCNC: 18 MMOL/L
CODING SYSTEM: NORMAL
CREAT SERPL-MCNC: 1.7 MG/DL
CREAT SERPL-MCNC: 1.9 MG/DL
DIFFERENTIAL METHOD: ABNORMAL
DISPENSE STATUS: NORMAL
EOSINOPHIL # BLD AUTO: 0.5 K/UL
EOSINOPHIL NFR BLD: 11.8 %
ERYTHROCYTE [DISTWIDTH] IN BLOOD BY AUTOMATED COUNT: 20.9 %
EST. GFR  (AFRICAN AMERICAN): 42.7 ML/MIN/1.73 M^2
EST. GFR  (AFRICAN AMERICAN): 48.9 ML/MIN/1.73 M^2
EST. GFR  (NON AFRICAN AMERICAN): 37 ML/MIN/1.73 M^2
EST. GFR  (NON AFRICAN AMERICAN): 42.3 ML/MIN/1.73 M^2
GLUCOSE SERPL-MCNC: 120 MG/DL
GLUCOSE SERPL-MCNC: 184 MG/DL
HCT VFR BLD AUTO: 22.8 %
HGB BLD-MCNC: 7.4 G/DL
IMM GRANULOCYTES # BLD AUTO: 0.01 K/UL
IMM GRANULOCYTES NFR BLD AUTO: 0.3 %
INR PPP: 1.7
LYMPHOCYTES # BLD AUTO: 1.3 K/UL
LYMPHOCYTES NFR BLD: 33.3 %
MAGNESIUM SERPL-MCNC: 2.1 MG/DL
MCH RBC QN AUTO: 34.4 PG
MCHC RBC AUTO-ENTMCNC: 32.5 G/DL
MCV RBC AUTO: 106 FL
MONOCYTES # BLD AUTO: 0.7 K/UL
MONOCYTES NFR BLD: 17.2 %
NEUTROPHILS # BLD AUTO: 1.4 K/UL
NEUTROPHILS NFR BLD: 36.1 %
NRBC BLD-RTO: 0 /100 WBC
PHOSPHATE SERPL-MCNC: 3.3 MG/DL
PLATELET # BLD AUTO: 34 K/UL
PLATELET BLD QL SMEAR: ABNORMAL
PMV BLD AUTO: 13.3 FL
POTASSIUM SERPL-SCNC: 3.5 MMOL/L
POTASSIUM SERPL-SCNC: 3.8 MMOL/L
PROT SERPL-MCNC: 4.5 G/DL
PROTHROMBIN TIME: 17 SEC
RBC # BLD AUTO: 2.15 M/UL
SODIUM SERPL-SCNC: 140 MMOL/L
SODIUM SERPL-SCNC: 141 MMOL/L
TRANS ERYTHROCYTES VOL PATIENT: NORMAL ML
WBC # BLD AUTO: 3.9 K/UL

## 2018-07-16 PROCEDURE — 25000003 PHARM REV CODE 250: Mod: NTX | Performed by: NURSE PRACTITIONER

## 2018-07-16 PROCEDURE — 86850 RBC ANTIBODY SCREEN: CPT | Mod: NTX

## 2018-07-16 PROCEDURE — 25000003 PHARM REV CODE 250: Mod: NTX | Performed by: PHYSICIAN ASSISTANT

## 2018-07-16 PROCEDURE — 86920 COMPATIBILITY TEST SPIN: CPT | Mod: NTX

## 2018-07-16 PROCEDURE — 36569 INSJ PICC 5 YR+ W/O IMAGING: CPT | Mod: NTX

## 2018-07-16 PROCEDURE — 83735 ASSAY OF MAGNESIUM: CPT | Mod: NTX

## 2018-07-16 PROCEDURE — G8987 SELF CARE CURRENT STATUS: HCPCS | Mod: CL,NTX

## 2018-07-16 PROCEDURE — 97168 OT RE-EVAL EST PLAN CARE: CPT | Mod: NTX

## 2018-07-16 PROCEDURE — G8988 SELF CARE GOAL STATUS: HCPCS | Mod: CK,NTX

## 2018-07-16 PROCEDURE — 84100 ASSAY OF PHOSPHORUS: CPT | Mod: NTX

## 2018-07-16 PROCEDURE — 63600175 PHARM REV CODE 636 W HCPCS: Mod: NTX | Performed by: STUDENT IN AN ORGANIZED HEALTH CARE EDUCATION/TRAINING PROGRAM

## 2018-07-16 PROCEDURE — 97164 PT RE-EVAL EST PLAN CARE: CPT | Mod: NTX

## 2018-07-16 PROCEDURE — 25000003 PHARM REV CODE 250: Mod: NTX | Performed by: STUDENT IN AN ORGANIZED HEALTH CARE EDUCATION/TRAINING PROGRAM

## 2018-07-16 PROCEDURE — P9021 RED BLOOD CELLS UNIT: HCPCS | Mod: NTX

## 2018-07-16 PROCEDURE — 80053 COMPREHEN METABOLIC PANEL: CPT | Mod: NTX

## 2018-07-16 PROCEDURE — 97535 SELF CARE MNGMENT TRAINING: CPT | Mod: NTX

## 2018-07-16 PROCEDURE — 99233 SBSQ HOSP IP/OBS HIGH 50: CPT | Mod: NTX,,, | Performed by: PHYSICIAN ASSISTANT

## 2018-07-16 PROCEDURE — 85025 COMPLETE CBC W/AUTO DIFF WBC: CPT | Mod: NTX

## 2018-07-16 PROCEDURE — 76937 US GUIDE VASCULAR ACCESS: CPT | Mod: NTX

## 2018-07-16 PROCEDURE — 80048 BASIC METABOLIC PNL TOTAL CA: CPT | Mod: NTX

## 2018-07-16 PROCEDURE — 63600175 PHARM REV CODE 636 W HCPCS: Mod: NTX | Performed by: PHYSICIAN ASSISTANT

## 2018-07-16 PROCEDURE — 97530 THERAPEUTIC ACTIVITIES: CPT | Mod: NTX

## 2018-07-16 PROCEDURE — C1751 CATH, INF, PER/CENT/MIDLINE: HCPCS | Mod: NTX

## 2018-07-16 PROCEDURE — 85610 PROTHROMBIN TIME: CPT | Mod: NTX

## 2018-07-16 PROCEDURE — 25000003 PHARM REV CODE 250: Mod: NTX | Performed by: INTERNAL MEDICINE

## 2018-07-16 PROCEDURE — 20600001 HC STEP DOWN PRIVATE ROOM: Mod: NTX

## 2018-07-16 RX ORDER — HYDROCODONE BITARTRATE AND ACETAMINOPHEN 500; 5 MG/1; MG/1
TABLET ORAL
Status: DISCONTINUED | OUTPATIENT
Start: 2018-07-16 | End: 2018-07-22

## 2018-07-16 RX ADMIN — SPIRONOLACTONE 200 MG: 50 TABLET, FILM COATED ORAL at 09:07

## 2018-07-16 RX ADMIN — OMEGA-3-ACID ETHYL ESTERS 2 G: 1 CAPSULE, LIQUID FILLED ORAL at 05:07

## 2018-07-16 RX ADMIN — OXYCODONE HYDROCHLORIDE AND ACETAMINOPHEN 1000 MG: 500 TABLET ORAL at 09:07

## 2018-07-16 RX ADMIN — ATORVASTATIN CALCIUM 40 MG: 20 TABLET, FILM COATED ORAL at 09:07

## 2018-07-16 RX ADMIN — RIFAXIMIN 550 MG: 550 TABLET ORAL at 09:07

## 2018-07-16 RX ADMIN — ASPIRIN 81 MG: 81 TABLET, COATED ORAL at 09:07

## 2018-07-16 RX ADMIN — LEVETIRACETAM 500 MG: 500 TABLET, FILM COATED ORAL at 09:07

## 2018-07-16 RX ADMIN — SODIUM BICARBONATE 650 MG TABLET 650 MG: at 09:07

## 2018-07-16 RX ADMIN — LIDOCAINE 2 PATCH: 50 PATCH TOPICAL at 04:07

## 2018-07-16 RX ADMIN — CEFEPIME 2 G: 2 INJECTION, POWDER, FOR SOLUTION INTRAVENOUS at 09:07

## 2018-07-16 RX ADMIN — CEFEPIME 2 G: 2 INJECTION, POWDER, FOR SOLUTION INTRAVENOUS at 10:07

## 2018-07-16 RX ADMIN — SERTRALINE HYDROCHLORIDE 50 MG: 50 TABLET ORAL at 09:07

## 2018-07-16 RX ADMIN — HEPARIN SODIUM 5000 UNITS: 5000 INJECTION, SOLUTION INTRAVENOUS; SUBCUTANEOUS at 04:07

## 2018-07-16 RX ADMIN — FLUCONAZOLE 200 MG: 200 TABLET ORAL at 09:07

## 2018-07-16 RX ADMIN — LACTULOSE 45 G: 20 SOLUTION ORAL at 09:07

## 2018-07-16 RX ADMIN — HEPARIN SODIUM 5000 UNITS: 5000 INJECTION, SOLUTION INTRAVENOUS; SUBCUTANEOUS at 05:07

## 2018-07-16 RX ADMIN — PANTOPRAZOLE SODIUM 40 MG: 40 TABLET, DELAYED RELEASE ORAL at 09:07

## 2018-07-16 RX ADMIN — FUROSEMIDE 80 MG: 80 TABLET ORAL at 09:07

## 2018-07-16 RX ADMIN — LACTULOSE 45 G: 20 SOLUTION ORAL at 04:07

## 2018-07-16 RX ADMIN — HEPARIN SODIUM 5000 UNITS: 5000 INJECTION, SOLUTION INTRAVENOUS; SUBCUTANEOUS at 09:07

## 2018-07-16 RX ADMIN — ONDANSETRON 4 MG: 4 TABLET, FILM COATED ORAL at 10:07

## 2018-07-16 NOTE — CONSULTS
Consult received for calorie count. Spoke with RN who states that after pt is no longer NPO will start calorie count. Will follow up as scheduled and monitor intake.

## 2018-07-16 NOTE — ASSESSMENT & PLAN NOTE
- VICKY initially improving with albumin infusion but with minimal intake 6/25 and 6/26.  - dc sodium bicarb due to hypernatremia  - nephrology consulted due to hypervolemia and multiple electrolyte abnormalities (acidosis, hypernatremia)  - Cr improving with hydration  - patient remains on diuretics d/t edema, but will continue to allow increased free water intake d/t hypernatremia

## 2018-07-16 NOTE — SUBJECTIVE & OBJECTIVE
Interval History: Patient evaluated bedside found in no acute distress, stable vital signs, alert, awake.  Night was uneventful.        Review of patient's allergies indicates:   Allergen Reactions    Nsaids (non-steroidal anti-inflammatory drug)      D/t to liver disease.    Tylenol [acetaminophen]      D/t liver disease.    Penicillins Other (See Comments)     Tolerated pip-tazo in 8/2016 without issue  Since childhood was told not to take it     Current Facility-Administered Medications   Medication Frequency    acetaminophen tablet 650 mg Q8H PRN    ascorbic acid (vitamin C) tablet 1,000 mg Daily    aspirin EC tablet 81 mg Daily    atorvastatin tablet 40 mg Daily    ceFEPIme injection 2 g Q12H    diphenhydrAMINE-zinc acetate 1-0.1% cream TID PRN    docusate sodium capsule 100 mg Daily    ergocalciferol capsule 50,000 Units Q7 Days    fluconazole tablet 200 mg Daily    furosemide tablet 80 mg BID    heparin (porcine) injection 5,000 Units Q8H    lactulose 10 gram/15 mL solution (enema) 200 g Q6H PRN    lactulose 20 gram/30 mL solution Soln 45 g TID    levETIRAcetam tablet 500 mg BID    lidocaine 5 % patch 2 patch Q24H    metoclopramide HCl injection 10 mg Q6H PRN    omega-3 acid ethyl esters capsule 2 g Daily with breakfast    ondansetron tablet 4 mg Q8H PRN    pantoprazole EC tablet 40 mg BID    rifAXIMin tablet 550 mg BID    sertraline tablet 50 mg Daily    sodium bicarbonate tablet 650 mg BID    sodium chloride 0.9% flush 3 mL PRN    spironolactone tablet 200 mg BID    traMADol tablet 50 mg Q6H PRN    vancomycin 750 mg in dextrose 5 % 250 mL IVPB (ready to mix system) Q24H       Objective:     Vital Signs (Most Recent):  Temp: 97.6 °F (36.4 °C) (07/16/18 0530)  Pulse: 73 (07/16/18 0322)  Resp: 18 (07/16/18 0322)  BP: (!) 125/59 (07/16/18 0322)  SpO2: 97 % (07/16/18 0322)  O2 Device (Oxygen Therapy): room air (07/15/18 1949) Vital Signs (24h Range):  Temp:  [95 °F (35 °C)-97.6 °F  (36.4 °C)] 97.6 °F (36.4 °C)  Pulse:  [68-79] 73  Resp:  [14-18] 18  SpO2:  [97 %-100 %] 97 %  BP: (114-138)/(54-62) 125/59     Weight: 126.3 kg (278 lb 7.1 oz) (07/16/18 0600)  Body mass index is 35.75 kg/m².  Body surface area is 2.57 meters squared.    I/O last 3 completed shifts:  In: 4301.3 [P.O.:4240; I.V.:61.3]  Out: 825 [Urine:825]    Physical Exam  Constitutional: He appears well-developed and well-nourished. No distress.   HENT:   Head: Normocephalic and atraumatic.   Neck: Neck supple.   Cardiovascular: Normal rate and regular rhythm.    Pulmonary/Chest: Effort normal and breath sounds normal. He has no wheezes.   Abdominal: Soft. He exhibits no distension.   Musculoskeletal: He exhibits edema (mild in low extremities +1).   Lymphadenopathy:     He has no cervical adenopathy.   Neurological: He is alert.   Disoriented, but improving to baseline mentation   Skin: Skin is warm. He is not diaphoretic.    Significant Labs:  CBC:   Recent Labs  Lab 07/16/18  0459   WBC 3.90   RBC 2.15*   HGB 7.4*   HCT 22.8*   PLT 34*   *   MCH 34.4*   MCHC 32.5     CMP:   Recent Labs  Lab 07/16/18  0459   *   CALCIUM 8.9   ALBUMIN 2.3*   PROT 4.5*      K 3.5   CO2 18*   *   BUN 42*   CREATININE 1.7*   ALKPHOS 81   ALT 35   AST 47*   BILITOT 2.2*     All labs within the past 24 hours have been reviewed.     Significant Imaging:  X-Ray: Reviewed  Personally reviewd CXR: no effusions or infiltrates

## 2018-07-16 NOTE — ASSESSMENT & PLAN NOTE
- Placed status 7 on 6/26 for prescription drug coverage change. Re-activated 7/3, MELD 22. Now back on hold d/t frailty and decompensation at the end of last week prompting ICU transfer. Listing tenuous due to frailty and malnutrition. Plan to submit MELD exception points.   - dc propanolol    MELD-Na score: 20 at 7/16/2018  4:59 AM  MELD score: 20 at 7/16/2018  4:59 AM  Calculated from:  Serum Creatinine: 1.7 mg/dL at 7/16/2018  4:59 AM  Serum Sodium: 141 mmol/L (Rounded to 137) at 7/16/2018  4:59 AM  Total Bilirubin: 2.2 mg/dL at 7/16/2018  4:59 AM  INR(ratio): 1.7 at 7/16/2018  4:59 AM  Age: 63 years

## 2018-07-16 NOTE — PLAN OF CARE
Problem: Occupational Therapy Goal  Goal: Occupational Therapy Goal  Goals to be met by: 7/30/18    Patient will increase functional independence with ADLs by performing:     Upper body dressing while seated EOB with supervision ( including snap/tie completion)  Grooming while standing at sink with Stand-by Assistance  Toileting from toilet with Stand-by Assistance for hygiene and clothing management.  Toilet transfer to toilet with Stand-by Assistance.  Pt will complete a functional dynamic standing activity ~8 minutes without a rest break.   Pt will complete therapy session with minimal cues provided for re-direction.  Pt oriented to person, place, time, and situation with independence.         Outcome: Ongoing (interventions implemented as appropriate)  Re-evaluation completed. Initiate POC.   Aida buckley OT  7/16/2018'

## 2018-07-16 NOTE — ASSESSMENT & PLAN NOTE
- completed vanc x 7 days for BLE cellulitis  - 2D Echo 7/9 normal EF (55-60%), trivial tricuspid/mitral regurg.  - continue diuretics, monitoring closely

## 2018-07-16 NOTE — PLAN OF CARE
Problem: Patient Care Overview  Goal: Plan of Care Review  Outcome: Ongoing (interventions implemented as appropriate)  Pt AAOx4, VSS, in bed with upper siderails raised x2, bed in lowest/locked position, call light/personal belongings within reach. Pt instructed to call for assistance. Pt verbalizes understanding. Pt afebrile at this time.  Proper hand hygiene performed before and after pt care.      Transferred 7/15 from ICU.  Patient re-activated on 7/3/2018. Current MELD 21.  Bear hugger remains on patient. Oral temperature 97.6.  Patient hypernatremic, encouraging PO intake. Patient has drank approximately 2500L water this shift. Patient compliant with low sodium diet.  175cc dark, concentrated urine output via griffin catheter with 1 bm so far this shift. Creatinine increased at 1.8.  7/13 blood/urine cx with NGTD. Cefepime and Vancomycin continued.   Weeping edema improved in bilateral upper and lower extremities. Sween moisturizer to extremities per wife.  Patient has gluteal redness/breakdown. Applying clear barrier cream.   PT/OT working with patient.     Will continue to monitor patient.

## 2018-07-16 NOTE — PT/OT/SLP RE-EVAL
"Physical Therapy Re-evaluation    Patient Name:  Alon Adamson   MRN:  [id number]    Recommendations:     Discharge Recommendations:  nursing facility, skilled   Discharge Equipment Recommendations: none   Barriers to discharge: Inaccessible home ([facility])    Assessment:     Alon Adamson is a 63 y.o. male admitted with a medical diagnosis of Acute kidney injury superimposed on chronic kidney disease.  He presents with the following impairments/functional limitations:  weakness, impaired balance, impaired functional mobilty, impaired sensation, impaired self care skills, gait instability, impaired endurance, edema, impaired skin, decreased lower extremity function, decreased upper extremity function, decreased safety awareness, decreased coordination, impaired coordination. Pt requiring Giana to complete bed mobility, able to perform transfers and gait with CGA and RW. Gait distance and further therapy limited by need to have BM. Delayed processing and slow responses noted throughout session with pt requiring increased time and cues throughout to attend to task. Pt would benefit from skilled acute PT in order to address current deficits and progress functional mobility.     Rehab Prognosis:  good; patient would benefit from acute skilled PT services to address these deficits and reach maximum level of function.      Recent Surgery: * No surgery found *      Plan:     During this hospitalization, patient to be seen 4 x/week to address the above listed problems via gait training, therapeutic activities, therapeutic exercises, neuromuscular re-education  · Plan of Care Expires:  08/14/18   Plan of Care Reviewed with: patient, mother    Subjective     Communicated with RN prior to session.  Patient found supine upon PT entry to room, agreeable to evaluation.      Chief Complaint: B shoulder pain   Patient comments/goals: Pt's mother asking, "Can he sit in his wheelchair?"  Pain/Comfort:  · Pain Rating 1:  (did not " rate)  · Location - Side 1: Bilateral  · Location 1: shoulder  · Pain Addressed 1: Reposition, Distraction    Patients cultural, spiritual, Buddhism conflicts given the current situation: none noted       Objective:     Patient found with: griffin catheter     General Precautions: Standard, fall   Orthopedic Precautions:N/A   Braces: N/A     Exams:  · Cognitive Exam:  Patient is oriented to Person and Place and follows one-step commands; delayed processing; slowed responses; inattention; easily distracted  · Postural Exam:  Patient presented with the following abnormalities:    · -       Rounded shoulders  · -       Forward head  · Sensation: reports numbness on ulnar aspect of B hands  · Skin Integrity/Edema:      · -       Skin integrity: red, dry, flaky  · -       Edema: BUE and BLE  · RLE ROM: WFL  · RLE Strength: WFL  · LLE ROM: WFL  · LLE Strength: WFL    Functional Mobility:  · Bed Mobility:     · Rolling L: Giana with side rail  · Rolling R: Giana with side rail   · Supine to Sit: minimum assistance with side rail and HOB elevated (managing trunk)  · Sit to Supine: contact guard assistance with side rail and HOB elevated   · Transfers:     · Sit to Stand:  contact guard assistance with rolling walker from elevated surface x2 reps  · Bed to Chair: contact guard assistance with  rolling walker  using  Stand Pivot  · Gait: ~5 ft. with RW and CGA; chair follow throughout; returned via chair  · Decreased step length, decreased antoine, increased time in double stance, impaired weight-shifting, decreased toe-floor clearance, mild gait instability   · Cues throughout for safety awareness, RW management, continued forward progression, forward gaze, and attention to task   · Further gait distance limited by need to have BM    AM-PAC 6 CLICK MOBILITY  Total Score:16       Therapeutic Activities and Exercises:   Pt educated on role of PT and PT POC. Pt verbalized understanding.   Bariatric BSC and RW ordered for use  during hospital admission. RN notified.   Placed on bed pan x2; call button within reach and pt instructed to call RN once finished.   Increased time required to complete session 2* slowed responses and inattention.     Patient left supine with all lines intact, call button in reach and RN and pt's mother present.    GOALS:    Physical Therapy Goals        Problem: Physical Therapy Goal    Goal Priority Disciplines Outcome Goal Variances Interventions   Physical Therapy Goal     PT/OT, PT Ongoing (interventions implemented as appropriate)     Description:  Goals to be met by: 18     Patient will increase functional independence with mobility by performin. Supine to sit with Stand-by Assistance.  2. Sit to stand with Supervision.  3. Gait x 200 ft with Supervision with RW.  4. Ascend/descend 5 stair with right Handrails with SBA.  5. Lower extremity exercise program x 20 reps, with supervision, in order to increase LE strength and (I) with functional mobility.                               History:     Past Medical History:   Diagnosis Date    Anticoagulant long-term use     Arthritis     Back pain     Cellulitis     Cirrhosis     Coronary artery disease     DM (diabetes mellitus)     Hearing loss     right ear    Hepatic encephalopathy     Hypertension     diagnosed today (2015) and prescribed toprol    Leg edema     Nephrolithiasis     JACK (obstructive sleep apnea)     Seizures     Splenomegaly        Past Surgical History:   Procedure Laterality Date    APPENDECTOMY      Open    BACK SURGERY      CHOLECYSTECTOMY      laprascopic    COLONOSCOPY N/A 2018    Procedure: COLONOSCOPY;  Surgeon: Lashawn Myles MD;  Location: [facility] ([address] [place] [address]);  Service: Endoscopy;  Laterality: N/A;    LUMBAR DISCECTOMY      SPLENIC ARTERY EMBOLIZATION  2016    Dr Taveras    TONSILLECTOMY         Time Tracking:     PT Received On: 18  PT Start Time: 1126     PT Stop Time:  1208  PT Total Time (min): 42 min     Billable Minutes: Re-eval 22 and Therapeutic Activity 20   (co-tx with OT)    Lucille Craig, PT, DPT   7/16/2018  [phone]

## 2018-07-16 NOTE — ASSESSMENT & PLAN NOTE
"- Dietary consulted for calorie count.  - Pt eating better accord to family.  Does take boost supplement.  - "pre-hab" regimen and supplements ordered. Dc creon due to excessive diarrhea  - Patient drinking at least 2-3 boost supplements and beneprotein daily.   - If continues to have low intake, consider starting Megace.    "

## 2018-07-16 NOTE — ASSESSMENT & PLAN NOTE
- ESLD secondary to EtOH cirrhosis with severe complications (hyperbilirubinemia, hypoalbuminemia, severe malnutrition, hepatic encephalopathy, thrombocytopenia, splenomegaly, ascites, hypervolemia, coaguloopathy, portal HTN), seizure disorder on Keppra, CKD3, CAD   - has frequent episodes of lethargy/confusion/slowing when holding lactulose   - admitted to SICU 7/13/18 for worsening encephalopathy and hypothermia  - PO lactulose TID, PRN lactulose enemas, Rifaximin  - patient alert and oriented today, conversational and pleasant

## 2018-07-16 NOTE — ASSESSMENT & PLAN NOTE
- worsening hypernatremia likely related to dehydration from GI loss with lactulose, large volume para 7/10 (4.8L off)  - nephrology consulted  - Na worsened over the weekend, encouraging patient to drink water, d5w infusion started per neph, monitor fluid status very closely as patient easily becomes hypervolemic. d5 gtt now stopped.   - continue increased free water intake  - sodium improved to 141 today

## 2018-07-16 NOTE — PT/OT/SLP RE-EVAL
Occupational Therapy   Re-evaluation    Name: Alon Adamson  MRN: [id number]  Admitting Diagnosis:  Acute kidney injury superimposed on chronic kidney disease      Recommendations:     Discharge Recommendations: nursing facility, skilled  Discharge Equipment Recommendations:  none  Barriers to discharge:  None    History:     Past Medical History:   Diagnosis Date    Anticoagulant long-term use     Arthritis     Back pain     Cellulitis     Cirrhosis     Coronary artery disease     DM (diabetes mellitus)     Hearing loss     right ear    Hepatic encephalopathy     Hypertension     diagnosed today (11/25/2015) and prescribed toprol    Leg edema     Nephrolithiasis     JACK (obstructive sleep apnea)     Seizures     Splenomegaly        Past Surgical History:   Procedure Laterality Date    APPENDECTOMY      Open    BACK SURGERY      CHOLECYSTECTOMY      laprascopic    COLONOSCOPY N/A 1/25/2018    Procedure: COLONOSCOPY;  Surgeon: Lashawn Myles MD;  Location: [facility] ([address] [place] [address]);  Service: Endoscopy;  Laterality: N/A;    LUMBAR DISCECTOMY      SPLENIC ARTERY EMBOLIZATION  04/08/2016    Dr Taveras    TONSILLECTOMY         Subjective     Chief Complaint: B shoulder pain with movement   Patient/Family stated goals: Return to PLOF  Communicated with: RN prior to session.  Pt agreeable to participate with therapy  Attempted at 10:32-10:42- pt stated he needed to have bowel movement, therapist assisted pt to bed pan by rolling to side lying at bed level; returned at 11:30 to complete re-evaluation.     Pain/Comfort:  · Pain Rating 1: 0/10 (Did not rate)  · Location - Side 1: Bilateral  · Location - Orientation 1: anterior  · Location 1: shoulder  · Pain Rating Post-Intervention 1: 0/10    Objective:     Patient found with: griffin catheter, telemetry, SCD    General Precautions: Standard, fall   Orthopedic Precautions:N/A   Braces: N/A     Occupational Performance:    Bed Mobility:    · Patient  completed Rolling/Turning to Left with  minimum assistance  · Patient completed Rolling/Turning to Right with minimum assistance  · Patient completed Supine to Sit with minimum assistance  · Patient completed Sit to Supine with contact guard assistance    Functional Mobility/Transfers:  · Patient completed Sit <> Stand Transfer with contact guard assistance  with  rolling walker   · Patient completed Bed <> Chair Transfer using Stand Pivot technique with contact guard assistance with rolling walker    Activities of Daily Living:  · Upper Body Dressing: moderate assistance to fer gown and personal jacket while seated EOB; pt demo difficutly with donning jacket to shoulders 2/2 shoulder pain; pt able to perform snap completion however required increased time  · Lower Body Dressing: total assistance to fer B socks while supine ( pt exhibits swelling in BLE's)   · Toileting: maximal assistance to complete toileting     Cognitive/Visual Perceptual:  Cognitive/Psychosocial Skills:     -       Oriented to: Person and Place, able to verbalize correct month when provided two options, reports it was 2016  -       Follows Commands/attention:Inattentive, Easily distracted and Follows one-step commands ** required multiple cues throughout session for re-direction  -       Communication: clear/fluent  -       Memory: deficits   -       Safety awareness/insight to disability: impaired   -       Mood/Affect/Coping skills/emotional control: Appropriate to situation  Visual/Perceptual:      -Intact    Physical Exam:  Postural examination/scapula alignment:    -       Rounded shoulders  Skin integrity: Dry, flaky,  Edema:  Moderate BUE/ BLEs  Sensation:  Reports being numb on ulnar aspect of BUE's  Motor Planning:    -       Intact  Dominant hand:    -       RUE  Upper Extremity Range of Motion:     -       Right Upper Extremity: shoulder flexion: 20* AROM; WFL PROM ( pain at shoulder joint); WFL elbow flexion   -       Left Upper  Extremity: shoulder flexion 90* AROM, WLF elbow flexion  Upper Extremity Strength:    -       Right Upper Extremity: shoulder flexion 2/5, elbow flexion 4/5  -       Left Upper Extremity: shoulder flexion 3/5; elbow flexion 4/5   Strength:    -       Right Upper Extremity: WFL  -       Left Upper Extremity: WFL  Fine Motor Coordination:    -       Intact  Gross motor coordination:   WFL    Patient left supine with all lines intact, call button in reach and RN notified  Pt transferred to w/c, however needed to have bowel movement and transferred back to bed ( Notified nurse to order bedside commode for room instead of using bed pan)     [facility] 6 Click:  [facility] Total Score: 14    Treatment & Education:  -Pt edu on OT role/POC  -Importance of OOB activity with staff assistance-- UIC during each meal   -Safety during functional t/f and mobility using RW  - White board updated  - Multiple self care tasks completed  - All questions/concerns answered within OT scope of practice    Cognition:   Oriented to person and place  Followed 100% of one-step commands  Required multiple cues for re-direction; pt easily distracted  Slow processing  Education:    Assessment:     Alon Adamson is a 63 y.o. male with a medical diagnosis of Acute kidney injury superimposed on chronic kidney disease.  He presents with B shoulder pain however tolerated treatment well this date.  Performance deficits affecting function are weakness, impaired endurance, impaired functional mobilty, gait instability, decreased upper extremity function, decreased lower extremity function, impaired self care skills, impaired skin, edema, impaired balance, impaired cognition, decreased safety awareness, impaired sensation, decreased ROM.  Pt would benefit from SNF following d/c to continue to progress towards goals and improve quality of life.     Rehab Prognosis:  Good; patient would benefit from acute skilled OT services to address these deficits and  "reach maximum level of function.         Clinical Decision Makin.  OT Mod:  "Pt evaluation falls under moderate complexity for evaluation coding due to identification of 3-5 performance deficits noted as stated above. Eval required Min/Mod assistance to complete on this date and detailed assessment(s) were utilized. Moreover, an expanded review of history and occupational profile obtained with additional review of cognitive, physical and psychosocial hx."     Plan:     Patient to be seen 4 x/week to address the above listed problems via self-care/home management, therapeutic activities, therapeutic exercises, neuromuscular re-education, cognitive retraining  · Plan of Care Expires: 18  · Plan of Care Reviewed with: patient, mother    This Plan of care has been discussed with the patient who was involved in its development and understands and is in agreement with the identified goals and treatment plan    GOALS:    Occupational Therapy Goals        Problem: Occupational Therapy Goal    Goal Priority Disciplines Outcome Interventions   Occupational Therapy Goal     OT, PT/OT Ongoing (interventions implemented as appropriate)    Description:  Goals to be met by: 18    Patient will increase functional independence with ADLs by performing:     Upper body dressing while seated EOB with supervision ( including snap/tie completion)  Grooming while standing at sink with Stand-by Assistance  Toileting from toilet with Stand-by Assistance for hygiene and clothing management.  Toilet transfer to toilet with Stand-by Assistance.  Pt will complete a functional dynamic standing activity ~8 minutes without a rest break.   Pt will complete therapy session with minimal cues provided for re-direction.  Pt oriented to person, place, time, and situation with independence.                           Time Tracking:     OT Date of Treatment: 18  OT Start Time: 1130   Add time: 1032- 10:42  OT Stop Time: 1158  OT " Total Time (min): 28 min + 10= 38    Billable Minutes:Re-eval 28  Self Care/Home Management 10    Aida buckley OT  7/16/2018

## 2018-07-16 NOTE — ASSESSMENT & PLAN NOTE
- Hypernatremia, improving after water hydration.  Today at 141.  - Continue with maintenance water oral 250 mLs every 8 hrs, and monitor Na levels.

## 2018-07-16 NOTE — PROGRESS NOTES
Spoke with NAN Jones, regarding patient's IV's lasting less than 2 days and increasing difficulty obtaining peripheral access.  Received order okay to place midline.  Will continue to monitor patient.

## 2018-07-16 NOTE — SUBJECTIVE & OBJECTIVE
Scheduled Meds:   ascorbic acid (vitamin C)  1,000 mg Oral Daily    aspirin  81 mg Oral Daily    atorvastatin  40 mg Oral Daily    ceFEPime (MAXIPIME) IVPB  2 g Intravenous Q12H    docusate sodium  100 mg Oral Daily    ergocalciferol  50,000 Units Oral Q7 Days    fluconazole  200 mg Oral Daily    furosemide  80 mg Oral BID    heparin (porcine)  5,000 Units Subcutaneous Q8H    lactulose  45 g Oral TID    levETIRAcetam  500 mg Oral BID    lidocaine  2 patch Transdermal Q24H    omega-3 acid ethyl esters  2 g Oral Daily with breakfast    pantoprazole  40 mg Oral BID    rifAXIMin  550 mg Oral BID    sertraline  50 mg Oral Daily    sodium bicarbonate  650 mg Oral BID    spironolactone  200 mg Oral BID     Continuous Infusions:  PRN Meds:sodium chloride, acetaminophen, diphenhydrAMINE-zinc acetate 1-0.1%, lactulose, metoclopramide HCl, ondansetron, sodium chloride 0.9%, traMADol    Review of Systems   Constitutional: Positive for appetite change (wife reports, patient does not much) and fatigue. Negative for activity change, chills and fever.   HENT: Negative for mouth sores, sore throat and trouble swallowing.    Eyes: Negative for visual disturbance.   Respiratory: Positive for shortness of breath (upon activity). Negative for wheezing.    Cardiovascular: Positive for leg swelling. Negative for chest pain.   Gastrointestinal: Positive for diarrhea (lactulose). Negative for abdominal distention, abdominal pain, nausea and vomiting.   Endocrine: Negative for polydipsia and polyuria.   Genitourinary: Negative for decreased urine volume and difficulty urinating.   Musculoskeletal: Positive for arthralgias (both shoulders have been hurting for last 2 weeks, right > left, improved with lidocaine patches).   Skin: Negative for rash.   Neurological: Positive for weakness. Negative for dizziness, syncope and headaches.   Hematological: Bruises/bleeds easily.   Psychiatric/Behavioral: Negative for agitation,  confusion, hallucinations and sleep disturbance.     Objective:     Vital Signs (Most Recent):  Temp: 97.8 °F (36.6 °C) (07/16/18 1206)  Pulse: 68 (07/16/18 1206)  Resp: 17 (07/16/18 1206)  BP: (!) 121/58 (07/16/18 1206)  SpO2: 99 % (07/16/18 1206) Vital Signs (24h Range):  Temp:  [95 °F (35 °C)-97.8 °F (36.6 °C)] 97.8 °F (36.6 °C)  Pulse:  [68-76] 68  Resp:  [15-18] 17  SpO2:  [97 %-100 %] 99 %  BP: (114-138)/(54-62) 121/58     Weight: 126.3 kg (278 lb 7.1 oz)  Body mass index is 35.75 kg/m².    Intake/Output - Last 3 Shifts       07/14 0700 - 07/15 0659 07/15 0700 - 07/16 0659 07/16 0700 - 07/17 0659    P.O. 1260 3940     I.V. (mL/kg) 561.3 (4.5)      Total Intake(mL/kg) 1821.3 (14.7) 3940 (31.2)     Urine (mL/kg/hr) 630 (0.2) 525 (0.2)     Stool 500 (0.2)      Total Output 1130 525      Net +691.3 +3415             Stool Occurrence 6 x 4 x     Emesis Occurrence  1 x           Physical Exam   Constitutional: He is oriented to person, place, and time. He appears well-developed and well-nourished. He is cooperative.   HENT:   Head: Normocephalic and atraumatic.   Mouth/Throat: No oropharyngeal exudate.   Eyes: EOM are normal. Pupils are equal, round, and reactive to light. Scleral icterus is present.   Neck: No JVD present.   Cardiovascular: Exam reveals no gallop and no friction rub.    Murmur heard.  Pulmonary/Chest: Effort normal and breath sounds normal. No stridor. No respiratory distress. He has no wheezes. He has no rales.   Abdominal: Soft. Bowel sounds are normal. He exhibits distension (mild). He exhibits no mass.   Musculoskeletal: He exhibits edema (+3 generalized edema ). He exhibits no tenderness.   Lymphadenopathy:     He has no cervical adenopathy.   Neurological: He is oriented to person, place, and time.   + asterixis and baseline tremor    Skin: Skin is warm and dry. No rash noted. No erythema.   BLE with chronic venous stasis changes, crusty/flaky skin intact without wounds   Psychiatric: He has  a normal mood and affect. His speech is delayed. He is slowed.       Laboratory:  Immunosuppressants     None        CBC:   Recent Labs  Lab 07/16/18 0459   WBC 3.90   RBC 2.15*   HGB 7.4*   HCT 22.8*   PLT 34*   *   MCH 34.4*   MCHC 32.5     CMP:   Recent Labs  Lab 07/16/18 0459   *   CALCIUM 8.9   ALBUMIN 2.3*   PROT 4.5*      K 3.5   CO2 18*   *   BUN 42*   CREATININE 1.7*   ALKPHOS 81   ALT 35   AST 47*   BILITOT 2.2*     Coagulation:   Recent Labs  Lab 07/16/18 0459   INR 1.7*     Labs within the past 24 hours have been reviewed.    Diagnostic Results:  pertinent imaging reviewed

## 2018-07-16 NOTE — PLAN OF CARE
Problem: Physical Therapy Goal  Goal: Physical Therapy Goal  Goals to be met by: 18     Patient will increase functional independence with mobility by performin. Supine to sit with Stand-by Assistance.  2. Sit to stand with Supervision.  3. Gait x 200 ft with Supervision with RW.  4. Ascend/descend 5 stair with right Handrails with SBA.  5. Lower extremity exercise program x 20 reps, with supervision, in order to increase LE strength and (I) with functional mobility.             Outcome: Ongoing (interventions implemented as appropriate)  PT re-evaluation complete and appropriate goals established.    Lucille Craig, PT, DPT   2018  [phone]

## 2018-07-16 NOTE — ASSESSMENT & PLAN NOTE
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  Nephrology consulted for VICKY.  Etiology most likely pre-renal.  Also considered HRS but less likely given optimal BP and renal function labs.     -sCr at baseline 1.4-1.5, sCr stable at 1.7 this morning, UOP increasing and about 30ccs per hour this morning  -no need for RRT, continue to monitor serial RFPs and strict Is & Os  - Avoid nephrotoxic medications  - Maintain MAP >65  - Hb > 7gm/dL  - ABGs, U/A, Labs in AM

## 2018-07-16 NOTE — PROGRESS NOTES
[facility]  Liver Transplant  Progress Note    Patient Name: Alon Adamson  MRN: [id number]  Admission Date: 6/22/2018  Hospital Length of Stay: 24 days  Code Status: Full Code  Primary Care Provider: Mckinley Vides MD    Subjective:     History of Present Illness:  Mr. 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  Complications of liver disease include hyperbilirubinemia, hypoalbuminemia, severe malnutrition,   hepatic encephalopathy, thrombocytopenia, splenomegaly, ascites, hypervolemia, coagulopathy, portal HTN.  Pt recently admitted for severe hepatic encephalopathy requiring intubation but has been stable from mentation standpoint post resolution of acute enceophalopathy. Mr. Adamson was seen in clinic 6/22 and was noted to have significant hypervolemia, weeping from BLE, and VICKY on CKD3 on routine labs.  His MELD increased from 22 to 26 per labs- coordinator notified and MELD updated.  Cr elevated to 2.3 from baseline of 1.4.  Also with worsening ascites.  Pt reported increased pruritis, generalized fatigue and weakness.  He presented in wheelchair.  ROS otherwise negative.      Hospital Course:  Patient is listed for liver transplant, placed on internal hold 6/25 for HE then status 7 on 6/26 due to change in prescription coverage. He was re-activated on the list 7/3, MELD 22. Pt started on IV diuretics 6/22 and continued 6/23 and 6/24.  BLE edema and ascites signif improved with diuresis and kidney function also improved. During admission, patient had worsening encephalopathy and asterixis prompting infectious work up, all blood and urine cultures obtained during admission with NGTD. Intermittently, encephalopathy severe enough to warrant lactulose enemas. CT head obtained and negative. Paracentesis 6/25 negative for peritonitis per cell count and U/A negative. Pt also with concern for BLE cellulitis- upper legs bright red with lower BLE still with dark  appearance of venous stasis. Placed on vancomycin with improvement in BLE redness 6/26. Vanc continued for 7 days (ended 7/1). His HE waxed and waned for several days requiring lactulose enemas. Pt eating minimally during period of encephalopathy requiring gentle hydration with IVF 6/26. Of note, taco placement attempted 6/26 but unable secondary to agitation upon insertion prompting self resolving nose bleed. Micro lab called 6/26 PM with blood cx + for G+C in blood- pt continued on vanc which was started 6/25. ID consulted, suspect + blood cultures contaminant. EEG obtained 6/26 as pt with h/o serizures and negative. Pt continued on home Keppra. VICKY on admission initially improved with diuresis, but Cr sakina intermittently during admission likely related to aggressive diuresis. Of note, with chest pain overnight 6/26-6/27 that resolved without intervention and pt reported as mild.  EKG NSR with PVC, troponin 0.1 in setting of hypervolemia + VICKY.  Normal dobutamine stress 1/2018.  Cardiology consulted and felt not ACS, no need for further workup other than continue to treat current risk factors for CAD with ASA (pt already on ASA and statin as outpt) + BB for portal hypertension which was started. Repeat paracentesis 7/10, 4.8L off, no SBP. Hypernatremia noted 7/9, nephrology consulted to aid in management. Hypernatremia attributed to dehydration from frequent BM's, diuretics were held, d/c'd creon due to excessive diarrhea, and patient was treated with increasing po free water and IVF. Echo 7/9 with normal EF, no wall abnormality, mild tricuspid/mitral regurg. Was sent to ICU 7/13/18 for lethargy/HE with hypotension and hypothermia. Received lactulose enemas and albumin bolus with improvement in mental status and BPS. Started on BS abx (cefepime/vanc/fluc), although Bcx and Ucx both NGTD.     Interval History: No acute events overnight. Transferred from ICU yesterday.  With hyponatremia over the weekend, improved  with increasing free water intake- sodium 141 today. Cultures NGTD, but will continue on vanc/cefepime/fluc for now. H/H decreased this morning, will give 1 unit PRBC. PT reconsulted following step down from ICU, encourage OOBTC and up with assistance.     Scheduled Meds:   ascorbic acid (vitamin C)  1,000 mg Oral Daily    aspirin  81 mg Oral Daily    atorvastatin  40 mg Oral Daily    ceFEPime (MAXIPIME) IVPB  2 g Intravenous Q12H    docusate sodium  100 mg Oral Daily    ergocalciferol  50,000 Units Oral Q7 Days    fluconazole  200 mg Oral Daily    furosemide  80 mg Oral BID    heparin (porcine)  5,000 Units Subcutaneous Q8H    lactulose  45 g Oral TID    levETIRAcetam  500 mg Oral BID    lidocaine  2 patch Transdermal Q24H    omega-3 acid ethyl esters  2 g Oral Daily with breakfast    pantoprazole  40 mg Oral BID    rifAXIMin  550 mg Oral BID    sertraline  50 mg Oral Daily    sodium bicarbonate  650 mg Oral BID    spironolactone  200 mg Oral BID     Continuous Infusions:  PRN Meds:sodium chloride, acetaminophen, diphenhydrAMINE-zinc acetate 1-0.1%, lactulose, metoclopramide HCl, ondansetron, sodium chloride 0.9%, traMADol    Review of Systems   Constitutional: Positive for appetite change (wife reports, patient does not much) and fatigue. Negative for activity change, chills and fever.   HENT: Negative for mouth sores, sore throat and trouble swallowing.    Eyes: Negative for visual disturbance.   Respiratory: Positive for shortness of breath (upon activity). Negative for wheezing.    Cardiovascular: Positive for leg swelling. Negative for chest pain.   Gastrointestinal: Positive for diarrhea (lactulose). Negative for abdominal distention, abdominal pain, nausea and vomiting.   Endocrine: Negative for polydipsia and polyuria.   Genitourinary: Negative for decreased urine volume and difficulty urinating.   Musculoskeletal: Positive for arthralgias (both shoulders have been hurting for last 2  weeks, right > left, improved with lidocaine patches).   Skin: Negative for rash.   Neurological: Positive for weakness. Negative for dizziness, syncope and headaches.   Hematological: Bruises/bleeds easily.   Psychiatric/Behavioral: Negative for agitation, confusion, hallucinations and sleep disturbance.     Objective:     Vital Signs (Most Recent):  Temp: 97.8 °F (36.6 °C) (07/16/18 1206)  Pulse: 68 (07/16/18 1206)  Resp: 17 (07/16/18 1206)  BP: (!) 121/58 (07/16/18 1206)  SpO2: 99 % (07/16/18 1206) Vital Signs (24h Range):  Temp:  [95 °F (35 °C)-97.8 °F (36.6 °C)] 97.8 °F (36.6 °C)  Pulse:  [68-76] 68  Resp:  [15-18] 17  SpO2:  [97 %-100 %] 99 %  BP: (114-138)/(54-62) 121/58     Weight: 126.3 kg (278 lb 7.1 oz)  Body mass index is 35.75 kg/m².    Intake/Output - Last 3 Shifts       07/14 0700 - 07/15 0659 07/15 0700 - 07/16 0659 07/16 0700 - 07/17 0659    P.O. 1260 3940     I.V. (mL/kg) 561.3 (4.5)      Total Intake(mL/kg) 1821.3 (14.7) 3940 (31.2)     Urine (mL/kg/hr) 630 (0.2) 525 (0.2)     Stool 500 (0.2)      Total Output 1130 525      Net +691.3 +3415             Stool Occurrence 6 x 4 x     Emesis Occurrence  1 x           Physical Exam   Constitutional: He is oriented to person, place, and time. He appears well-developed and well-nourished. He is cooperative.   HENT:   Head: Normocephalic and atraumatic.   Mouth/Throat: No oropharyngeal exudate.   Eyes: EOM are normal. Pupils are equal, round, and reactive to light. Scleral icterus is present.   Neck: No JVD present.   Cardiovascular: Exam reveals no gallop and no friction rub.    Murmur heard.  Pulmonary/Chest: Effort normal and breath sounds normal. No stridor. No respiratory distress. He has no wheezes. He has no rales.   Abdominal: Soft. Bowel sounds are normal. He exhibits distension (mild). He exhibits no mass.   Musculoskeletal: He exhibits edema (+3 generalized edema ). He exhibits no tenderness.   Lymphadenopathy:     He has no cervical  adenopathy.   Neurological: He is oriented to person, place, and time.   + asterixis and baseline tremor    Skin: Skin is warm and dry. No rash noted. No erythema.   BLE with chronic venous stasis changes, crusty/flaky skin intact without wounds   Psychiatric: He has a normal mood and affect. His speech is delayed. He is slowed.       Laboratory:  Immunosuppressants     None        CBC:   Recent Labs  Lab 07/16/18  0459   WBC 3.90   RBC 2.15*   HGB 7.4*   HCT 22.8*   PLT 34*   *   MCH 34.4*   MCHC 32.5     CMP:   Recent Labs  Lab 07/16/18  0459   *   CALCIUM 8.9   ALBUMIN 2.3*   PROT 4.5*      K 3.5   CO2 18*   *   BUN 42*   CREATININE 1.7*   ALKPHOS 81   ALT 35   AST 47*   BILITOT 2.2*     Coagulation:   Recent Labs  Lab 07/16/18  0459   INR 1.7*     Labs within the past 24 hours have been reviewed.    Diagnostic Results:  pertinent imaging reviewed    Assessment/Plan:     * Acute kidney injury superimposed on chronic kidney disease    - VICKY initially improving with albumin infusion but with minimal intake 6/25 and 6/26.  - dc sodium bicarb due to hypernatremia  - nephrology consulted due to hypervolemia and multiple electrolyte abnormalities (acidosis, hypernatremia)  - Cr improving with hydration  - patient remains on diuretics d/t edema, but will continue to allow increased free water intake d/t hypernatremia         Hepatic encephalopathy    - ESLD secondary to EtOH cirrhosis with severe complications (hyperbilirubinemia, hypoalbuminemia, severe malnutrition, hepatic encephalopathy, thrombocytopenia, splenomegaly, ascites, hypervolemia, coaguloopathy, portal HTN), seizure disorder on Keppra, CKD3, CAD   - has frequent episodes of lethargy/confusion/slowing when holding lactulose   - admitted to SICU 7/13/18 for worsening encephalopathy and hypothermia  - PO lactulose TID, PRN lactulose enemas, Rifaximin  - patient alert and oriented today, conversational and pleasant          "  Decompensated hepatic cirrhosis    - Placed status 7 on 6/26 for prescription drug coverage change. Re-activated 7/3, MELD 22. Now back on hold d/t frailty and decompensation at the end of last week prompting ICU transfer. Listing tenuous due to frailty and malnutrition. Plan to submit MELD exception points.   - dc propanolol    MELD-Na score: 20 at 7/16/2018  4:59 AM  MELD score: 20 at 7/16/2018  4:59 AM  Calculated from:  Serum Creatinine: 1.7 mg/dL at 7/16/2018  4:59 AM  Serum Sodium: 141 mmol/L (Rounded to 137) at 7/16/2018  4:59 AM  Total Bilirubin: 2.2 mg/dL at 7/16/2018  4:59 AM  INR(ratio): 1.7 at 7/16/2018  4:59 AM  Age: 63 years        CKD (chronic kidney disease), stage III    - see acute kidney injury superimposed on CKD III        Bilateral edema of lower extremity    - completed vanc x 7 days for BLE cellulitis  - 2D Echo 7/9 normal EF (55-60%), trivial tricuspid/mitral regurg.  - continue diuretics, monitoring closely         Hypernatremia    - worsening hypernatremia likely related to dehydration from GI loss with lactulose, large volume para 7/10 (4.8L off)  - nephrology consulted  - Na worsened over the weekend, encouraging patient to drink water, d5w infusion started per neph, monitor fluid status very closely as patient easily becomes hypervolemic. d5 gtt now stopped.   - continue increased free water intake  - sodium improved to 141 today        Hypoalbuminemia due to protein-calorie malnutrition    - see severe protein-calorie malnutrition        Physical deconditioning    - PT/OT consulted.  - encourage OOBTC for several hours each day, walking with walker with PT        Anemia of chronic disease    - H&H decreased.  - 1 unit PRBC transfused 7/16/18.  - monitor.         Dermatitis associated with moisture              Severe protein-calorie malnutrition    - Dietary consulted for calorie count.  - Pt eating better accord to family.  Does take boost supplement.  - "pre-hab" regimen and " supplements ordered. Dc creon due to excessive diarrhea  - Patient drinking at least 2-3 boost supplements and beneprotein daily.   - If continues to have low intake, consider starting Megace.          Ascites due to alcoholic cirrhosis    - Para 6/25 negative for infection   - Para 7/10 negative for infection          Coagulopathy    - Secondary to decompensated liver disease.        Cholestatic pruritus    - cholestyramine has previously been ineffective, given depressed mood; will initiate zoloft for mood and pruritus.        Pancytopenia    - related to decompensated liver disease        Coronary artery disease involving native coronary artery of native heart without angina pectoris    - Continue home ASA.  - 2D Echo 7/9 normal EF (55-60%), trivial tricuspid/mitral regurg.        Thrombocytopenia    - Secondary to splenomegaly from portal HTN- should improve with txp.  - No s/s overt bleed.        Seizures    - Oral Keppra transitioned to IV as pt not able to swallow 6/26.  - Resumed oral Keppra since mentation improved.  - EEG 6/26 negative.            VTE Risk Mitigation         Ordered     heparin (porcine) injection 5,000 Units  Every 8 hours      06/22/18 1502     IP VTE HIGH RISK PATIENT  Once      06/22/18 1502          The patients clinical status was discussed at multidisplinary rounds, involving transplant surgery, transplant medicine, pharmacy, nursing, nutrition, and social work    Discharge Planning:  No Patient Care Coordination Note on file.      Alexandrea Das PA-C  Liver Transplant  [facility]

## 2018-07-16 NOTE — CONSULTS
Placed 18g X 8cm midline in left cephalic vein of LUE using realtime ultrasound guidance. Lot#[id number]. Remove on or before 08/14/2018 .

## 2018-07-17 ENCOUNTER — TELEPHONE (OUTPATIENT)
Dept: TRANSPLANT | Facility: CLINIC | Age: 63
End: 2018-07-17

## 2018-07-17 PROBLEM — L89.152 STAGE II PRESSURE ULCER OF SACRAL REGION: Status: ACTIVE | Noted: 2018-07-17

## 2018-07-17 PROBLEM — E87.0 HYPERNATREMIA: Status: RESOLVED | Noted: 2018-07-10 | Resolved: 2018-07-17

## 2018-07-17 LAB
ALBUMIN SERPL BCP-MCNC: 2.4 G/DL
ALBUMIN SERPL BCP-MCNC: 2.4 G/DL
ALP SERPL-CCNC: 85 U/L
ALP SERPL-CCNC: 92 U/L
ALT SERPL W/O P-5'-P-CCNC: 37 U/L
ALT SERPL W/O P-5'-P-CCNC: 38 U/L
ANION GAP SERPL CALC-SCNC: 7 MMOL/L
ANION GAP SERPL CALC-SCNC: 9 MMOL/L
ANION GAP SERPL CALC-SCNC: 9 MMOL/L
ANISOCYTOSIS BLD QL SMEAR: SLIGHT
AST SERPL-CCNC: 62 U/L
AST SERPL-CCNC: 68 U/L
BACTERIA #/AREA URNS AUTO: ABNORMAL /HPF
BACTERIA BLD CULT: NORMAL
BACTERIA BLD CULT: NORMAL
BACTERIA SPEC ANAEROBE CULT: NORMAL
BASOPHILS # BLD AUTO: 0.02 K/UL
BASOPHILS # BLD AUTO: 0.05 K/UL
BASOPHILS NFR BLD: 0.6 %
BASOPHILS NFR BLD: 1.1 %
BILIRUB SERPL-MCNC: 2.3 MG/DL
BILIRUB SERPL-MCNC: 2.3 MG/DL
BILIRUB UR QL STRIP: NEGATIVE
BUN SERPL-MCNC: 47 MG/DL
BUN SERPL-MCNC: 49 MG/DL
BUN SERPL-MCNC: 49 MG/DL
CALCIUM SERPL-MCNC: 9 MG/DL
CALCIUM SERPL-MCNC: 9 MG/DL
CALCIUM SERPL-MCNC: 9.1 MG/DL
CHLORIDE SERPL-SCNC: 116 MMOL/L
CHLORIDE SERPL-SCNC: 117 MMOL/L
CHLORIDE SERPL-SCNC: 119 MMOL/L
CLARITY UR REFRACT.AUTO: ABNORMAL
CO2 SERPL-SCNC: 15 MMOL/L
CO2 SERPL-SCNC: 15 MMOL/L
CO2 SERPL-SCNC: 16 MMOL/L
COLOR UR AUTO: ABNORMAL
CREAT SERPL-MCNC: 2.2 MG/DL
CREAT SERPL-MCNC: 2.5 MG/DL
CREAT SERPL-MCNC: 2.6 MG/DL
DIFFERENTIAL METHOD: ABNORMAL
DIFFERENTIAL METHOD: ABNORMAL
EOSINOPHIL # BLD AUTO: 0.4 K/UL
EOSINOPHIL # BLD AUTO: 0.5 K/UL
EOSINOPHIL NFR BLD: 10 %
EOSINOPHIL NFR BLD: 11.7 %
ERYTHROCYTE [DISTWIDTH] IN BLOOD BY AUTOMATED COUNT: 20.3 %
ERYTHROCYTE [DISTWIDTH] IN BLOOD BY AUTOMATED COUNT: 21.1 %
EST. GFR  (AFRICAN AMERICAN): 29.2 ML/MIN/1.73 M^2
EST. GFR  (AFRICAN AMERICAN): 30.7 ML/MIN/1.73 M^2
EST. GFR  (AFRICAN AMERICAN): 35.8 ML/MIN/1.73 M^2
EST. GFR  (NON AFRICAN AMERICAN): 25.3 ML/MIN/1.73 M^2
EST. GFR  (NON AFRICAN AMERICAN): 26.5 ML/MIN/1.73 M^2
EST. GFR  (NON AFRICAN AMERICAN): 31 ML/MIN/1.73 M^2
GLUCOSE SERPL-MCNC: 143 MG/DL
GLUCOSE SERPL-MCNC: 179 MG/DL
GLUCOSE SERPL-MCNC: 205 MG/DL
GLUCOSE UR QL STRIP: NEGATIVE
HCT VFR BLD AUTO: 23.5 %
HCT VFR BLD AUTO: 26.1 %
HGB BLD-MCNC: 7.9 G/DL
HGB BLD-MCNC: 8.3 G/DL
HGB UR QL STRIP: ABNORMAL
HYALINE CASTS UR QL AUTO: 4 /LPF
HYPOCHROMIA BLD QL SMEAR: ABNORMAL
IMM GRANULOCYTES # BLD AUTO: 0.01 K/UL
IMM GRANULOCYTES # BLD AUTO: 0.01 K/UL
IMM GRANULOCYTES NFR BLD AUTO: 0.2 %
IMM GRANULOCYTES NFR BLD AUTO: 0.3 %
INR PPP: 1.6
KETONES UR QL STRIP: ABNORMAL
LEUKOCYTE ESTERASE UR QL STRIP: ABNORMAL
LYMPHOCYTES # BLD AUTO: 1 K/UL
LYMPHOCYTES # BLD AUTO: 1.3 K/UL
LYMPHOCYTES NFR BLD: 26.5 %
LYMPHOCYTES NFR BLD: 28.5 %
MAGNESIUM SERPL-MCNC: 3.1 MG/DL
MAGNESIUM SERPL-MCNC: 3.2 MG/DL
MCH RBC QN AUTO: 33.9 PG
MCH RBC QN AUTO: 34.3 PG
MCHC RBC AUTO-ENTMCNC: 31.8 G/DL
MCHC RBC AUTO-ENTMCNC: 33.6 G/DL
MCV RBC AUTO: 102 FL
MCV RBC AUTO: 107 FL
MICROSCOPIC COMMENT: ABNORMAL
MONOCYTES # BLD AUTO: 0.5 K/UL
MONOCYTES # BLD AUTO: 0.8 K/UL
MONOCYTES NFR BLD: 13.8 %
MONOCYTES NFR BLD: 16.5 %
NEUTROPHILS # BLD AUTO: 1.5 K/UL
NEUTROPHILS # BLD AUTO: 2.2 K/UL
NEUTROPHILS NFR BLD: 45.1 %
NEUTROPHILS NFR BLD: 45.7 %
NITRITE UR QL STRIP: NEGATIVE
NRBC BLD-RTO: 0 /100 WBC
NRBC BLD-RTO: 0 /100 WBC
OVALOCYTES BLD QL SMEAR: ABNORMAL
PH UR STRIP: 5 [PH] (ref 5–8)
PHOSPHATE SERPL-MCNC: 4 MG/DL
PLATELET # BLD AUTO: 33 K/UL
PLATELET # BLD AUTO: 36 K/UL
PMV BLD AUTO: 13.8 FL
PMV BLD AUTO: ABNORMAL FL
POIKILOCYTOSIS BLD QL SMEAR: SLIGHT
POLYCHROMASIA BLD QL SMEAR: ABNORMAL
POTASSIUM SERPL-SCNC: 4.5 MMOL/L
POTASSIUM SERPL-SCNC: 4.5 MMOL/L
POTASSIUM SERPL-SCNC: 5.2 MMOL/L
PROT SERPL-MCNC: 5 G/DL
PROT SERPL-MCNC: 5.1 G/DL
PROT UR QL STRIP: ABNORMAL
PROTHROMBIN TIME: 15.7 SEC
RBC # BLD AUTO: 2.3 M/UL
RBC # BLD AUTO: 2.45 M/UL
RBC #/AREA URNS AUTO: >100 /HPF (ref 0–4)
SODIUM SERPL-SCNC: 140 MMOL/L
SODIUM SERPL-SCNC: 141 MMOL/L
SODIUM SERPL-SCNC: 142 MMOL/L
SP GR UR STRIP: 1.02 (ref 1–1.03)
SQUAMOUS #/AREA URNS AUTO: 0 /HPF
URN SPEC COLLECT METH UR: ABNORMAL
UROBILINOGEN UR STRIP-ACNC: NEGATIVE EU/DL
WBC # BLD AUTO: 3.33 K/UL
WBC # BLD AUTO: 4.72 K/UL
WBC #/AREA URNS AUTO: 73 /HPF (ref 0–5)
WBC CLUMPS UR QL AUTO: ABNORMAL

## 2018-07-17 PROCEDURE — 25000003 PHARM REV CODE 250: Mod: NTX | Performed by: INTERNAL MEDICINE

## 2018-07-17 PROCEDURE — 25000003 PHARM REV CODE 250: Mod: NTX | Performed by: NURSE PRACTITIONER

## 2018-07-17 PROCEDURE — 36415 COLL VENOUS BLD VENIPUNCTURE: CPT | Mod: NTX

## 2018-07-17 PROCEDURE — 63600175 PHARM REV CODE 636 W HCPCS: Mod: NTX | Performed by: NURSE PRACTITIONER

## 2018-07-17 PROCEDURE — 25000003 PHARM REV CODE 250: Mod: NTX | Performed by: STUDENT IN AN ORGANIZED HEALTH CARE EDUCATION/TRAINING PROGRAM

## 2018-07-17 PROCEDURE — 99233 SBSQ HOSP IP/OBS HIGH 50: CPT | Mod: NTX,,, | Performed by: NURSE PRACTITIONER

## 2018-07-17 PROCEDURE — 80053 COMPREHEN METABOLIC PANEL: CPT | Mod: NTX

## 2018-07-17 PROCEDURE — 80053 COMPREHEN METABOLIC PANEL: CPT | Mod: 91,NTX

## 2018-07-17 PROCEDURE — 87086 URINE CULTURE/COLONY COUNT: CPT | Mod: NTX

## 2018-07-17 PROCEDURE — 80048 BASIC METABOLIC PNL TOTAL CA: CPT | Mod: NTX

## 2018-07-17 PROCEDURE — 84100 ASSAY OF PHOSPHORUS: CPT | Mod: NTX

## 2018-07-17 PROCEDURE — 83735 ASSAY OF MAGNESIUM: CPT | Mod: NTX

## 2018-07-17 PROCEDURE — 25000003 PHARM REV CODE 250: Mod: NTX | Performed by: PHYSICIAN ASSISTANT

## 2018-07-17 PROCEDURE — 83735 ASSAY OF MAGNESIUM: CPT | Mod: 91,NTX

## 2018-07-17 PROCEDURE — 87040 BLOOD CULTURE FOR BACTERIA: CPT | Mod: NTX

## 2018-07-17 PROCEDURE — 81001 URINALYSIS AUTO W/SCOPE: CPT | Mod: NTX

## 2018-07-17 PROCEDURE — 85610 PROTHROMBIN TIME: CPT | Mod: NTX

## 2018-07-17 PROCEDURE — 85025 COMPLETE CBC W/AUTO DIFF WBC: CPT | Mod: NTX

## 2018-07-17 PROCEDURE — 20600001 HC STEP DOWN PRIVATE ROOM: Mod: NTX

## 2018-07-17 RX ORDER — SODIUM BICARBONATE 650 MG/1
1300 TABLET ORAL 3 TIMES DAILY
Status: DISCONTINUED | OUTPATIENT
Start: 2018-07-17 | End: 2018-07-18

## 2018-07-17 RX ORDER — CEFEPIME HYDROCHLORIDE 1 G/1
1 INJECTION, POWDER, FOR SOLUTION INTRAMUSCULAR; INTRAVENOUS
Status: DISCONTINUED | OUTPATIENT
Start: 2018-07-17 | End: 2018-07-23

## 2018-07-17 RX ADMIN — PANTOPRAZOLE SODIUM 40 MG: 40 TABLET, DELAYED RELEASE ORAL at 12:07

## 2018-07-17 RX ADMIN — CEFEPIME 1 G: 1 INJECTION, POWDER, FOR SOLUTION INTRAMUSCULAR; INTRAVENOUS at 06:07

## 2018-07-17 RX ADMIN — LEVETIRACETAM 500 MG: 500 TABLET, FILM COATED ORAL at 09:07

## 2018-07-17 RX ADMIN — SODIUM BICARBONATE 650 MG TABLET 1300 MG: at 04:07

## 2018-07-17 RX ADMIN — RIFAXIMIN 550 MG: 550 TABLET ORAL at 12:07

## 2018-07-17 RX ADMIN — METOCLOPRAMIDE 10 MG: 5 INJECTION, SOLUTION INTRAMUSCULAR; INTRAVENOUS at 08:07

## 2018-07-17 RX ADMIN — ATORVASTATIN CALCIUM 40 MG: 20 TABLET, FILM COATED ORAL at 12:07

## 2018-07-17 RX ADMIN — ONDANSETRON 4 MG: 4 TABLET, FILM COATED ORAL at 01:07

## 2018-07-17 RX ADMIN — OMEGA-3-ACID ETHYL ESTERS 2 G: 1 CAPSULE, LIQUID FILLED ORAL at 01:07

## 2018-07-17 RX ADMIN — LACTULOSE 45 G: 20 SOLUTION ORAL at 09:07

## 2018-07-17 RX ADMIN — PANTOPRAZOLE SODIUM 40 MG: 40 TABLET, DELAYED RELEASE ORAL at 09:07

## 2018-07-17 RX ADMIN — RIFAXIMIN 550 MG: 550 TABLET ORAL at 09:07

## 2018-07-17 RX ADMIN — LIDOCAINE 2 PATCH: 50 PATCH TOPICAL at 01:07

## 2018-07-17 RX ADMIN — ONDANSETRON 4 MG: 4 TABLET, FILM COATED ORAL at 09:07

## 2018-07-17 RX ADMIN — SODIUM BICARBONATE 650 MG TABLET 1300 MG: at 01:07

## 2018-07-17 RX ADMIN — LACTULOSE 45 G: 20 SOLUTION ORAL at 12:07

## 2018-07-17 RX ADMIN — FLUCONAZOLE 200 MG: 200 TABLET ORAL at 12:07

## 2018-07-17 RX ADMIN — LACTULOSE 200 G: 10 SOLUTION ORAL at 12:07

## 2018-07-17 RX ADMIN — ASPIRIN 81 MG: 81 TABLET, COATED ORAL at 12:07

## 2018-07-17 RX ADMIN — LACTULOSE 45 G: 20 SOLUTION ORAL at 04:07

## 2018-07-17 RX ADMIN — SODIUM BICARBONATE 650 MG TABLET 1300 MG: at 09:07

## 2018-07-17 RX ADMIN — DOCUSATE SODIUM 100 MG: 100 CAPSULE, LIQUID FILLED ORAL at 12:07

## 2018-07-17 RX ADMIN — SERTRALINE HYDROCHLORIDE 50 MG: 50 TABLET ORAL at 12:07

## 2018-07-17 RX ADMIN — OXYCODONE HYDROCHLORIDE AND ACETAMINOPHEN 1000 MG: 500 TABLET ORAL at 12:07

## 2018-07-17 RX ADMIN — LEVETIRACETAM 500 MG: 500 TABLET, FILM COATED ORAL at 12:07

## 2018-07-17 NOTE — ASSESSMENT & PLAN NOTE
- Avoid nephrotoxic medications  - maintain MAP > 65  - Hb > 7 mg/dl  - Strict I/O and chart  - Daily weights and chart  - Urine sample spin  - continue Lasix 80 mg, BID  7/18 Lasix DCed yesterday, patient had 100 cc urine in the bag while I saw him in the am. Cr has increased 0.4 point  - will ask nursing staff to weight patient more often

## 2018-07-17 NOTE — PLAN OF CARE
Problem: Patient Care Overview  Goal: Plan of Care Review  Outcome: Ongoing (interventions implemented as appropriate)  Pt is AAOX4-was lethargic part of shift, VSS, and 3 person assist. Lactulose enema given-pt had 3 BMs during shift. Telemetry monitoring in place. Pt's mother at bedside. Pt's bed in lowest position, call bell within reach, and nonskid socks on.

## 2018-07-17 NOTE — TELEPHONE ENCOUNTER
PATIENT NAME: Alon Muller rohan  [facility] #: [id number]    Lab Results   Component Value Date    CREATININE 2.2 (H) 07/17/2018     07/17/2018    BILITOT 2.3 (H) 07/17/2018    ALBUMIN 2.4 (L) 07/17/2018    INR 1.6 (H) 07/17/2018     MELD 22  Encephalopathy: 1 - 2  Ascites: moderate  Dialysis: no     Recertification requestor: Yady Dietz

## 2018-07-17 NOTE — PROGRESS NOTES
[facility]  Nephrology  Progress Note    Patient Name: Alon Adamson  MRN: [id number]  Admission Date: 6/22/2018  Hospital Length of Stay: 25 days  Attending Provider: Elan Guerra MD   Primary Care Physician: Mckinley Vides MD  Principal Problem:Acute kidney injury superimposed on chronic kidney disease    Subjective:     HPI: Patient is 64 yo male with PMH of decompensated alcoholic cirrhosis, CKD III, CAD which was admitted on 6/22 due to BLE edema and VICKY on CKD. Patient was consulted on worsening VICKY despite lasix and albumin challenge. Patient's baseline cr was 1.5. Patient's course of tretment was complicated by an episode of hepatic encephalopathy which took patient to ICU, 2 days later patient stepped down to transplant unit. Patient also received 7 days of vancomycin for cellulitis treatment.     Interval History: 7/17    Review of Systems   Constitutional: Negative for fever.   HENT: Negative for drooling and trouble swallowing.    Respiratory: Negative for cough, choking, chest tightness, shortness of breath, wheezing and stridor.    Cardiovascular: Positive for leg swelling. Negative for chest pain and palpitations.   Gastrointestinal: Negative for abdominal distention and abdominal pain.   Genitourinary: Negative for dysuria and hematuria.   Skin: Negative for color change and pallor.     Objective:     Vital Signs (Most Recent):  Temp: 99.3 °F (37.4 °C) (07/17/18 1118)  Pulse: 94 (07/17/18 1118)  Resp: 16 (07/17/18 1118)  BP: (!) 125/55 (07/17/18 1118)  SpO2: 95 % (07/17/18 1118) Vital Signs (24h Range):  Temp:  [92.8 °F (33.8 °C)-99.3 °F (37.4 °C)] 99.3 °F (37.4 °C)  Pulse:  [66-94] 94  Resp:  [16-18] 16  SpO2:  [95 %-99 %] 95 %  BP: (113-138)/(52-63) 125/55     Weight: 126.3 kg (278 lb 7.1 oz)  Body mass index is 35.75 kg/m².    Intake/Output Summary (Last 24 hours) at 07/17/18 1409  Last data filed at 07/17/18 0600   Gross per 24 hour   Intake            642.5 ml   Output               550 ml   Net             92.5 ml      Physical Exam   Constitutional: He appears well-developed and well-nourished. No distress.   HENT:   Head: Normocephalic and atraumatic.   Eyes: Pupils are equal, round, and reactive to light.   Neck: Neck supple. No JVD present.   Cardiovascular: Normal rate, regular rhythm, normal heart sounds and intact distal pulses.  Exam reveals no friction rub.    No murmur heard.  Pulmonary/Chest: Effort normal and breath sounds normal. No stridor. No respiratory distress. He has no wheezes. He has no rales. He exhibits no tenderness.   Abdominal: Soft. Bowel sounds are normal. He exhibits no distension. There is no tenderness.   Musculoskeletal: Normal range of motion. He exhibits edema (Edema in all extremeties). He exhibits no tenderness or deformity.   Neurological: He is alert.   Skin: Skin is warm and dry. Capillary refill takes less than 2 seconds. He is not diaphoretic.   BLE scaling and dried patches       Significant Labs:   BMP:   Recent Labs  Lab 07/17/18  0500   *      K 4.5   *   CO2 15*   BUN 47*   CREATININE 2.2*   CALCIUM 9.0   MG 3.2*     CBC:   Recent Labs  Lab 07/16/18  0459 07/17/18  0500   WBC 3.90 3.33*   HGB 7.4* 7.9*   HCT 22.8* 23.5*   PLT 34* 33*           Assessment/Plan:     * Acute kidney injury superimposed on chronic kidney disease    - Avoid nephrotoxic medications  - maintain MAP > 65  - Hb > 7 mg/dl  - Strict I/O and chart  - Daily weights and chart  - Urine sample spin  - continue Lasix 80 mg, BID                  Chichi Urbina MD  Nephrology  [facility]

## 2018-07-17 NOTE — PROGRESS NOTES
Notified MD Ted of patients rectal temp 92.3 F, bear hugger in place + 4 blankets and heating packs. Asymptomatic, other VSS. Orders to continue to monitor rectal temp every hour until improvement.

## 2018-07-17 NOTE — ASSESSMENT & PLAN NOTE
- ESLD secondary to EtOH cirrhosis with severe complications (hyperbilirubinemia, hypoalbuminemia, severe malnutrition, hepatic encephalopathy, thrombocytopenia, splenomegaly, ascites, hypervolemia, coaguloopathy, portal HTN), seizure disorder on Keppra, CKD3, CAD   - has frequent episodes of lethargy/confusion/slowing when holding lactulose   - admitted to SICU 7/13/18 for worsening encephalopathy and hypothermia  - PO lactulose TID, PRN lactulose enemas, Rifaximin  - more lethargic this am, gave lactulose enema with good response

## 2018-07-17 NOTE — CARE UPDATE
Rapid Response Follow-up Note    Followed up with patient for proactive rounding.   No acute issues at this time. Vital signs within normal limits  Reviewed plan of care with primary RN.   Please call Rapid Response RN with any questions or concerns at  [phone]

## 2018-07-17 NOTE — SUBJECTIVE & OBJECTIVE
Interval History: 7/18    Review of Systems   Constitutional: Positive for activity change. Negative for fever.   HENT: Negative for drooling and trouble swallowing.    Respiratory: Negative for cough, choking, chest tightness, shortness of breath, wheezing and stridor.    Cardiovascular: Positive for leg swelling. Negative for chest pain and palpitations.   Gastrointestinal: Negative for abdominal distention, abdominal pain, constipation, nausea and vomiting.   Genitourinary: Negative for dysuria and hematuria.   Skin: Negative for color change and pallor.   Neurological: Negative for headaches.   Psychiatric/Behavioral: Negative for agitation, confusion and sleep disturbance.     Objective:     Vital Signs (Most Recent):  Temp: 99.3 °F (37.4 °C) (07/17/18 1118)  Pulse: 94 (07/17/18 1118)  Resp: 16 (07/17/18 1118)  BP: (!) 125/55 (07/17/18 1118)  SpO2: 95 % (07/17/18 1118) Vital Signs (24h Range):  Temp:  [92.8 °F (33.8 °C)-99.3 °F (37.4 °C)] 99.3 °F (37.4 °C)  Pulse:  [66-94] 94  Resp:  [16-18] 16  SpO2:  [95 %-99 %] 95 %  BP: (113-138)/(52-63) 125/55     Weight: 126.3 kg (278 lb 7.1 oz)  Body mass index is 35.75 kg/m².    Intake/Output Summary (Last 24 hours) at 07/17/18 1409  Last data filed at 07/17/18 0600   Gross per 24 hour   Intake            642.5 ml   Output              550 ml   Net             92.5 ml      Physical Exam   Constitutional: He appears well-developed and well-nourished. No distress.   HENT:   Head: Normocephalic and atraumatic.   Eyes: Pupils are equal, round, and reactive to light.   Neck: Neck supple. No JVD present.   Cardiovascular: Normal rate, regular rhythm, normal heart sounds and intact distal pulses.  Exam reveals no friction rub.    No murmur heard.  Pulmonary/Chest: Effort normal and breath sounds normal. No stridor. No respiratory distress. He has no wheezes. He has no rales. He exhibits no tenderness.   Abdominal: Soft. Bowel sounds are normal. He exhibits no distension. There  is no tenderness.   Musculoskeletal: Normal range of motion. He exhibits edema (Edema in all extremeties). He exhibits no tenderness or deformity.   Neurological: He is alert.   Skin: Skin is warm and dry. Capillary refill takes less than 2 seconds. He is not diaphoretic.   BLE scaling and dried patches       Significant Labs:   BMP:   Recent Labs  Lab 07/17/18  0500   *      K 4.5   *   CO2 15*   BUN 47*   CREATININE 2.2*   CALCIUM 9.0   MG 3.2*     CBC:   Recent Labs  Lab 07/16/18  0459 07/17/18  0500   WBC 3.90 3.33*   HGB 7.4* 7.9*   HCT 22.8* 23.5*   PLT 34* 33*

## 2018-07-17 NOTE — TELEPHONE ENCOUNTER
Returned Pt's call N/A left message on V/M.        ----- Message from Umm Robledo sent at 7/17/2018  4:15 PM CDT -----  Contact: Vin Adamson   Needs Advice    Reason for call:    Pt wife wants to know if pt will be tranplanted soon  Communication Preference: [phone]  Additional Information: n/a

## 2018-07-17 NOTE — HPI
Patient is 64 yo male with PMH of decompensated alcoholic cirrhosis, CKD III, CAD which was admitted on 6/22 due to BLE edema and VICKY on CKD. Patient was consulted on worsening VICKY despite lasix and albumin challenge. Patient's baseline cr was 1.5. Patient's course of tretment was complicated by an episode of hepatic encephalopathy which took patient to ICU, 2 days later patient stepped down to transplant unit. Patient also received 7 days of vancomycin for cellulitis treatment.

## 2018-07-17 NOTE — PROGRESS NOTES
Update:  SW attempted to speak to patient and caregiver on two occasions. Patient was receiving enema when SW attempted the first time, and patient was asleep the second attempt. LEAH contacted patient's wife/caregiver, Vin Adamson ph#[phone]. Pt's caregiver reported that she and her son returned home. Caregiver reported that her son's school has early registration tomorrow, 7/18/19. Pt's caregiver reported that she will return to [place] following the completion of registration. Pt's caregiver stated that she has been receiving updates from the pt's mother. Pt's mother is reportedly still staying locally. Caregiver reported that she and the patient are coping adequately with the support of family and denies any current mental health difficulties. SW will continue to follow patient for resources, education, support and dc planning as appropriate. SW remains available.

## 2018-07-17 NOTE — ASSESSMENT & PLAN NOTE
- completed vanc x 7 days for BLE cellulitis  - 2D Echo 7/9 normal EF (55-60%), trivial tricuspid/mitral regurg.  - held diuretics, due to rise in CR, monitoring closely

## 2018-07-17 NOTE — CARE UPDATE
RN Proactive Rounding Note  Time of Visit: 924    Admit Date: 2018  LOS: 25  Code Status: Full Code   Date of Visit: 2018  : 1955  Age: 63 y.o.  Sex: male  Bed: 8075/8075 A:   MRN: [id number]  Was the patient discharged from an ICU this admission? Yes   Was the patient discharged from a PACU within last 24 hours? No  Did the patient receive conscious sedation/general anesthesia in last 24 hours? No  Was the patient in the ED within the past 24 hours? No  Was the patient started on NIPPV within the past 24 hours? No  Attending Physician: Elan Guerra MD  Primary Service: [facility]      ASSESSMENT:     Abnormal Vital Signs: VSS  Clinical Issues: Neuro     INTERVENTIONS/ RECOMMENDATIONS:     No interventions at this time. Pt seen s/t to charge nurse worried about worsening AMS. Primary RN states pt oriented but very lethargic. Pts temp now WNL, all VSS. Will continue to follow pt     Discussed plan of care with GUTIERREZ MARIN ESCALATION:     Yes/No No     Disposition: 8075    FOLLOW-UP/CONTINGENCY:       Call back the Rapid Response Nurse at x [phone]  for additional questions or concerns

## 2018-07-17 NOTE — PROGRESS NOTES
[facility]  Liver Transplant  Progress Note    Patient Name: Alon Adamson  MRN: [id number]  Admission Date: 6/22/2018  Hospital Length of Stay: 25 days  Code Status: Full Code  Primary Care Provider: Mckinley Vides MD    Subjective:     History of Present Illness:  Mr. 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  Complications of liver disease include hyperbilirubinemia, hypoalbuminemia, severe malnutrition,   hepatic encephalopathy, thrombocytopenia, splenomegaly, ascites, hypervolemia, coagulopathy, portal HTN.  Pt recently admitted for severe hepatic encephalopathy requiring intubation but has been stable from mentation standpoint post resolution of acute enceophalopathy. Mr. Adamson was seen in clinic 6/22 and was noted to have significant hypervolemia, weeping from BLE, and VICKY on CKD3 on routine labs.  His MELD increased from 22 to 26 per labs- coordinator notified and MELD updated.  Cr elevated to 2.3 from baseline of 1.4.  Also with worsening ascites.  Pt reported increased pruritis, generalized fatigue and weakness.  He presented in wheelchair.  ROS otherwise negative.      Hospital Course:  Patient is listed for liver transplant, placed on internal hold 6/25 for HE then status 7 on 6/26 due to change in prescription coverage. He was re-activated on the list 7/3, MELD 22. Pt started on IV diuretics 6/22 and continued 6/23 and 6/24.  BLE edema and ascites signif improved with diuresis and kidney function also improved. During admission, patient had worsening encephalopathy and asterixis prompting infectious work up, all blood and urine cultures obtained during admission with NGTD. Intermittently, encephalopathy severe enough to warrant lactulose enemas. CT head obtained and negative. Paracentesis 6/25 negative for peritonitis per cell count and U/A negative. Pt also with concern for BLE cellulitis- upper legs bright red with lower BLE still with dark  appearance of venous stasis. Placed on vancomycin with improvement in BLE redness 6/26. Vanc continued for 7 days (ended 7/1). His HE waxed and waned for several days requiring lactulose enemas. Pt eating minimally during period of encephalopathy requiring gentle hydration with IVF 6/26. Of note, taco placement attempted 6/26 but unable secondary to agitation upon insertion prompting self resolving nose bleed. Micro lab called 6/26 PM with blood cx + for G+C in blood- pt continued on vanc which was started 6/25. ID consulted, suspect + blood cultures contaminant. EEG obtained 6/26 as pt with h/o serizures and negative. Pt continued on home Keppra. VICKY on admission initially improved with diuresis, but Cr sakina intermittently during admission likely related to aggressive diuresis. Of note, with chest pain overnight 6/26-6/27 that resolved without intervention and pt reported as mild.  EKG NSR with PVC, troponin 0.1 in setting of hypervolemia + VICKY.  Normal dobutamine stress 1/2018.  Cardiology consulted and felt not ACS, no need for further workup other than continue to treat current risk factors for CAD with ASA (pt already on ASA and statin as outpt) + BB for portal hypertension which was started. Repeat paracentesis 7/10, 4.8L off, no SBP. Hypernatremia noted 7/9, nephrology consulted to aid in management. Hypernatremia attributed to dehydration from frequent BM's, diuretics were held, d/c'd creon due to excessive diarrhea, and patient was treated with increasing po free water and IVF. Echo 7/9 with normal EF, no wall abnormality, mild tricuspid/mitral regurg. Was sent to ICU 7/13/18 for lethargy/HE with hypotension and hypothermia. Received lactulose enemas and albumin bolus with improvement in mental status and BPS. Started on BS abx (cefepime/vanc/fluc), although Bcx and Ucx both NGTD.     Interval History: No acute events overnight. Transferred from ICU 7/15.  Cultures NGTD, but will continue on cefepime/fluc  for now. H/H decreased 7/16, transfused 1 unit PRBC.  Taken off internal hold, listed with MELD 22.  Lethargic upon assessment this am.  Lactulose enema given with improvement in mental status.  PT reconsulted following step down from ICU, encourage OOBTC and up with assistance.     Scheduled Meds:   ascorbic acid (vitamin C)  1,000 mg Oral Daily    aspirin  81 mg Oral Daily    atorvastatin  40 mg Oral Daily    ceFEPime (MAXIPIME) IVPB  1 g Intravenous Q12H    docusate sodium  100 mg Oral Daily    ergocalciferol  50,000 Units Oral Q7 Days    fluconazole  200 mg Oral Daily    heparin (porcine)  5,000 Units Subcutaneous Q8H    lactulose  45 g Oral TID    levETIRAcetam  500 mg Oral BID    lidocaine  2 patch Transdermal Q24H    omega-3 acid ethyl esters  2 g Oral Daily with breakfast    pantoprazole  40 mg Oral BID    rifAXIMin  550 mg Oral BID    sertraline  50 mg Oral Daily    sodium bicarbonate  1,300 mg Oral TID     Continuous Infusions:  PRN Meds:sodium chloride, acetaminophen, diphenhydrAMINE-zinc acetate 1-0.1%, lactulose, metoclopramide HCl, ondansetron, sodium chloride 0.9%, traMADol    Review of Systems   Constitutional: Positive for appetite change and fatigue. Negative for activity change, chills and fever.   HENT: Negative for mouth sores, sore throat and trouble swallowing.    Eyes: Negative for visual disturbance.   Respiratory: Positive for shortness of breath (upon activity). Negative for wheezing.    Cardiovascular: Positive for leg swelling. Negative for chest pain.   Gastrointestinal: Positive for diarrhea (lactulose). Negative for abdominal distention, abdominal pain, nausea and vomiting.   Endocrine: Negative for polydipsia and polyuria.   Genitourinary: Negative for decreased urine volume and difficulty urinating.   Musculoskeletal: Positive for arthralgias (both shoulders have been hurting for last 2 weeks, right > left, improved with lidocaine patches).   Skin: Negative for rash.    Neurological: Positive for weakness. Negative for dizziness, syncope and headaches.   Hematological: Bruises/bleeds easily.   Psychiatric/Behavioral: Negative for agitation, confusion, hallucinations and sleep disturbance.     Objective:     Vital Signs (Most Recent):  Temp: 99.3 °F (37.4 °C) (07/17/18 1118)  Pulse: 94 (07/17/18 1118)  Resp: 16 (07/17/18 1118)  BP: (!) 125/55 (07/17/18 1118)  SpO2: 95 % (07/17/18 1118) Vital Signs (24h Range):  Temp:  [92.8 °F (33.8 °C)-99.3 °F (37.4 °C)] 99.3 °F (37.4 °C)  Pulse:  [66-94] 94  Resp:  [16-18] 16  SpO2:  [95 %-99 %] 95 %  BP: (113-138)/(52-63) 125/55     Weight: 126.3 kg (278 lb 7.1 oz)  Body mass index is 35.75 kg/m².    Intake/Output - Last 3 Shifts       07/15 0700 - 07/16 0659 07/16 0700 - 07/17 0659 07/17 0700 - 07/18 0659    P.O. 3940 480     Blood  162.5     Total Intake(mL/kg) 3940 (31.2) 642.5 (5.1)     Urine (mL/kg/hr) 525 (0.2) 550 (0.2)     Total Output 525 550      Net +3415 +92.5             Stool Occurrence 6 x 4 x 1 x    Emesis Occurrence 1 x            Physical Exam   Constitutional: He appears well-developed and well-nourished. He appears lethargic. He is cooperative.   HENT:   Head: Normocephalic and atraumatic.   Mouth/Throat: No oropharyngeal exudate.   Eyes: EOM are normal. Pupils are equal, round, and reactive to light. Scleral icterus is present.   Neck: No JVD present.   Cardiovascular: Normal rate, regular rhythm and intact distal pulses.  Exam reveals no gallop and no friction rub.    Murmur heard.  Pulmonary/Chest: Effort normal and breath sounds normal. No stridor. No respiratory distress. He has no wheezes. He has no rales.   Abdominal: Soft. Bowel sounds are normal. He exhibits no distension and no mass.   Musculoskeletal: He exhibits edema (+3 generalized edema ). He exhibits no tenderness.   Lymphadenopathy:     He has no cervical adenopathy.   Neurological: He appears lethargic.   + asterixis and baseline tremor    Skin: Skin is  "warm and dry. No rash noted. No erythema.   BLE with chronic venous stasis changes, crusty/flaky skin intact without wounds   Psychiatric: He has a normal mood and affect. His speech is delayed. He is slowed.   Nursing note and vitals reviewed.      Laboratory:  Immunosuppressants     None        CBC:     Recent Labs  Lab 07/17/18  0500   WBC 3.33*   RBC 2.30*   HGB 7.9*   HCT 23.5*   PLT 33*   *   MCH 34.3*   MCHC 33.6     CMP:     Recent Labs  Lab 07/17/18  0500   *   CALCIUM 9.0   ALBUMIN 2.4*   PROT 5.0*      K 4.5   CO2 15*   *   BUN 47*   CREATININE 2.2*   ALKPHOS 85   ALT 37   AST 62*   BILITOT 2.3*     Coagulation:     Recent Labs  Lab 07/17/18  0500   INR 1.6*     Labs within the past 24 hours have been reviewed.    Diagnostic Results:  pertinent imaging reviewed    Assessment/Plan:     * Acute kidney injury superimposed on chronic kidney disease    - VICKY initially improving with albumin infusion but with minimal intake 6/25 and 6/26.  - dc sodium bicarb due to hypernatremia  - nephrology consulted due to hypervolemia and multiple electrolyte abnormalities (acidosis, hypernatremia)  - hold diuretics as worse CR        Severe protein-calorie malnutrition    - Dietary consulted for calorie count.  - Pt eating better accord to family.  Does take boost supplement.  - "pre-hab" regimen and supplements ordered. Dc creon due to excessive diarrhea  - Patient drinking at least 2-3 boost supplements and beneprotein daily.   - If continues to have low intake, consider starting Megace.          Physical deconditioning    - PT/OT consulted.  - encourage OOBTC for several hours each day, walking with walker with PT        Ascites due to alcoholic cirrhosis    - Para 6/25 negative for infection   - Para 7/10 negative for infection          Coagulopathy    - Secondary to decompensated liver disease.        Cholestatic pruritus    - cholestyramine has previously been ineffective, given depressed " mood; will initiate zoloft for mood and pruritus.        Hypoalbuminemia due to protein-calorie malnutrition    - see severe protein-calorie malnutrition        Pancytopenia    - related to decompensated liver disease        Decompensated hepatic cirrhosis    - Placed status 7 on 6/26 for prescription drug coverage change. Re-activated 7/3, MELD 22. Back on hold d/t frailty and decompensation at the end of last week prompting ICU transfer. Now off hold 7/17.  Plan to submit MELD exception points.   - dc propanolol    MELD-Na score: 22 at 7/17/2018  5:00 AM  MELD score: 22 at 7/17/2018  5:00 AM  Calculated from:  Serum Creatinine: 2.2 mg/dL at 7/17/2018  5:00 AM  Serum Sodium: 141 mmol/L (Rounded to 137) at 7/17/2018  5:00 AM  Total Bilirubin: 2.3 mg/dL at 7/17/2018  5:00 AM  INR(ratio): 1.6 at 7/17/2018  5:00 AM  Age: 63 years        Coronary artery disease involving native coronary artery of native heart without angina pectoris    - Continue home ASA.  - 2D Echo 7/9 normal EF (55-60%), trivial tricuspid/mitral regurg.        Anemia of chronic disease    - H&H decreased.  - 1 unit PRBC transfused 7/16/18.  - monitor.         Thrombocytopenia    - Secondary to splenomegaly from portal HTN- should improve with txp.  - No s/s overt bleed.        Seizures    - Oral Keppra transitioned to IV as pt not able to swallow 6/26.  - Resumed oral Keppra since mentation improved.  - EEG 6/26 negative.        Bilateral edema of lower extremity    - completed vanc x 7 days for BLE cellulitis  - 2D Echo 7/9 normal EF (55-60%), trivial tricuspid/mitral regurg.  - held diuretics, due to rise in CR, monitoring closely         Hepatic encephalopathy    - ESLD secondary to EtOH cirrhosis with severe complications (hyperbilirubinemia, hypoalbuminemia, severe malnutrition, hepatic encephalopathy, thrombocytopenia, splenomegaly, ascites, hypervolemia, coaguloopathy, portal HTN), seizure disorder on Keppra, CKD3, CAD   - has frequent  episodes of lethargy/confusion/slowing when holding lactulose   - admitted to SICU 7/13/18 for worsening encephalopathy and hypothermia  - PO lactulose TID, PRN lactulose enemas, Rifaximin  - more lethargic this am, gave lactulose enema with good response              VTE Risk Mitigation         Ordered     heparin (porcine) injection 5,000 Units  Every 8 hours      06/22/18 1502     IP VTE HIGH RISK PATIENT  Once      06/22/18 1502          The patients clinical status was discussed at multidisplinary rounds, involving transplant surgery, transplant medicine, pharmacy, nursing, nutrition, and social work    Discharge Planning:  No Patient Care Coordination Note on file.      Myrtle Sellers, NP  Liver Transplant  [facility]

## 2018-07-17 NOTE — SUBJECTIVE & OBJECTIVE
Scheduled Meds:   ascorbic acid (vitamin C)  1,000 mg Oral Daily    aspirin  81 mg Oral Daily    atorvastatin  40 mg Oral Daily    ceFEPime (MAXIPIME) IVPB  1 g Intravenous Q12H    docusate sodium  100 mg Oral Daily    ergocalciferol  50,000 Units Oral Q7 Days    fluconazole  200 mg Oral Daily    heparin (porcine)  5,000 Units Subcutaneous Q8H    lactulose  45 g Oral TID    levETIRAcetam  500 mg Oral BID    lidocaine  2 patch Transdermal Q24H    omega-3 acid ethyl esters  2 g Oral Daily with breakfast    pantoprazole  40 mg Oral BID    rifAXIMin  550 mg Oral BID    sertraline  50 mg Oral Daily    sodium bicarbonate  1,300 mg Oral TID     Continuous Infusions:  PRN Meds:sodium chloride, acetaminophen, diphenhydrAMINE-zinc acetate 1-0.1%, lactulose, metoclopramide HCl, ondansetron, sodium chloride 0.9%, traMADol    Review of Systems   Constitutional: Positive for appetite change and fatigue. Negative for activity change, chills and fever.   HENT: Negative for mouth sores, sore throat and trouble swallowing.    Eyes: Negative for visual disturbance.   Respiratory: Positive for shortness of breath (upon activity). Negative for wheezing.    Cardiovascular: Positive for leg swelling. Negative for chest pain.   Gastrointestinal: Positive for diarrhea (lactulose). Negative for abdominal distention, abdominal pain, nausea and vomiting.   Endocrine: Negative for polydipsia and polyuria.   Genitourinary: Negative for decreased urine volume and difficulty urinating.   Musculoskeletal: Positive for arthralgias (both shoulders have been hurting for last 2 weeks, right > left, improved with lidocaine patches).   Skin: Negative for rash.   Neurological: Positive for weakness. Negative for dizziness, syncope and headaches.   Hematological: Bruises/bleeds easily.   Psychiatric/Behavioral: Negative for agitation, confusion, hallucinations and sleep disturbance.     Objective:     Vital Signs (Most Recent):  Temp: 99.3  °F (37.4 °C) (07/17/18 1118)  Pulse: 94 (07/17/18 1118)  Resp: 16 (07/17/18 1118)  BP: (!) 125/55 (07/17/18 1118)  SpO2: 95 % (07/17/18 1118) Vital Signs (24h Range):  Temp:  [92.8 °F (33.8 °C)-99.3 °F (37.4 °C)] 99.3 °F (37.4 °C)  Pulse:  [66-94] 94  Resp:  [16-18] 16  SpO2:  [95 %-99 %] 95 %  BP: (113-138)/(52-63) 125/55     Weight: 126.3 kg (278 lb 7.1 oz)  Body mass index is 35.75 kg/m².    Intake/Output - Last 3 Shifts       07/15 0700 - 07/16 0659 07/16 0700 - 07/17 0659 07/17 0700 - 07/18 0659    P.O. 3940 480     Blood  162.5     Total Intake(mL/kg) 3940 (31.2) 642.5 (5.1)     Urine (mL/kg/hr) 525 (0.2) 550 (0.2)     Total Output 525 550      Net +3415 +92.5             Stool Occurrence 6 x 4 x 1 x    Emesis Occurrence 1 x            Physical Exam   Constitutional: He appears well-developed and well-nourished. He appears lethargic. He is cooperative.   HENT:   Head: Normocephalic and atraumatic.   Mouth/Throat: No oropharyngeal exudate.   Eyes: EOM are normal. Pupils are equal, round, and reactive to light. Scleral icterus is present.   Neck: No JVD present.   Cardiovascular: Normal rate, regular rhythm and intact distal pulses.  Exam reveals no gallop and no friction rub.    Murmur heard.  Pulmonary/Chest: Effort normal and breath sounds normal. No stridor. No respiratory distress. He has no wheezes. He has no rales.   Abdominal: Soft. Bowel sounds are normal. He exhibits no distension and no mass.   Musculoskeletal: He exhibits edema (+3 generalized edema ). He exhibits no tenderness.   Lymphadenopathy:     He has no cervical adenopathy.   Neurological: He appears lethargic.   + asterixis and baseline tremor    Skin: Skin is warm and dry. No rash noted. No erythema.   BLE with chronic venous stasis changes, crusty/flaky skin intact without wounds   Psychiatric: He has a normal mood and affect. His speech is delayed. He is slowed.   Nursing note and vitals reviewed.      Laboratory:  Immunosuppressants      None        CBC:     Recent Labs  Lab 07/17/18  0500   WBC 3.33*   RBC 2.30*   HGB 7.9*   HCT 23.5*   PLT 33*   *   MCH 34.3*   MCHC 33.6     CMP:     Recent Labs  Lab 07/17/18  0500   *   CALCIUM 9.0   ALBUMIN 2.4*   PROT 5.0*      K 4.5   CO2 15*   *   BUN 47*   CREATININE 2.2*   ALKPHOS 85   ALT 37   AST 62*   BILITOT 2.3*     Coagulation:     Recent Labs  Lab 07/17/18  0500   INR 1.6*     Labs within the past 24 hours have been reviewed.    Diagnostic Results:  pertinent imaging reviewed

## 2018-07-17 NOTE — PROGRESS NOTES
New consult for stage II to gluteal crease  Wound superficial , red and moist with jagged edges indicative of shear injury.  Xerosis to BLE improving and was able to remove lifting scale with cleansing and moisturizing.      07/17/18 1230       Wound 06/09/18 0100 Other (comment) lower Leg   Date First Assessed/Time First Assessed: 06/09/18 0100   Pre-existing: Yes  Primary Wound Type: (c) Other (comment)  Side: (c)   Orientation: lower  Location: Leg   Wound WDL ex   Dressing Appearance Open to air;No dressing   Drainage Amount None   Appearance Dry  (xerosis, scale)   Periwound Area Intact   Care Cleansed with:;Soap and water;Applied:;Moisturizing agent       Wound 06/09/18 0100 Moisture associated dermatitis Buttocks   Date First Assessed/Time First Assessed: 06/09/18 0100   Pre-existing: Yes  Primary Wound Type: Moisture associated dermatitis  Location: Buttocks   Wound Image    Wound WDL ex   Dressing Appearance Open to air;No dressing  (barrier cream)   Drainage Amount None   Drainage Characteristics/Odor No odor   Appearance Red;Moist   Tissue loss description Partial thickness   Red (%), Wound Tissue Color 100 %   Periwound Area Intact   Wound Edges Open;Jagged   Wound Length (cm) 3 cm   Wound Width (cm) 1 cm   Wound Depth (cm) 0.1 cm   Wound Volume (cm^3) 0.3 cm^3   Care Cleansed with:;Sterile normal saline;Applied:;Skin Barrier  (Critic aid paste)   Apply critic aid paste to sacral breakdown BID and prn each incontinent episode.  Has just been administered lactulose and diarrhea has been persistent.  Rickie Davis RN CWON  [phone]

## 2018-07-17 NOTE — ASSESSMENT & PLAN NOTE
- Placed status 7 on 6/26 for prescription drug coverage change. Re-activated 7/3, MELD 22. Back on hold d/t frailty and decompensation at the end of last week prompting ICU transfer. Now off hold 7/17.  Plan to submit MELD exception points.   - dc propanolol    MELD-Na score: 22 at 7/17/2018  5:00 AM  MELD score: 22 at 7/17/2018  5:00 AM  Calculated from:  Serum Creatinine: 2.2 mg/dL at 7/17/2018  5:00 AM  Serum Sodium: 141 mmol/L (Rounded to 137) at 7/17/2018  5:00 AM  Total Bilirubin: 2.3 mg/dL at 7/17/2018  5:00 AM  INR(ratio): 1.6 at 7/17/2018  5:00 AM  Age: 63 years

## 2018-07-17 NOTE — ASSESSMENT & PLAN NOTE
- VICKY initially improving with albumin infusion but with minimal intake 6/25 and 6/26.  - dc sodium bicarb due to hypernatremia  - nephrology consulted due to hypervolemia and multiple electrolyte abnormalities (acidosis, hypernatremia)  - hold diuretics as worse CR

## 2018-07-18 ENCOUNTER — TELEPHONE (OUTPATIENT)
Dept: TRANSPLANT | Facility: CLINIC | Age: 63
End: 2018-07-18

## 2018-07-18 LAB
ALBUMIN SERPL BCP-MCNC: 2.3 G/DL
ALBUMIN SERPL BCP-MCNC: 2.3 G/DL
ALP SERPL-CCNC: 100 U/L
ALP SERPL-CCNC: 92 U/L
ALT SERPL W/O P-5'-P-CCNC: 38 U/L
ALT SERPL W/O P-5'-P-CCNC: 41 U/L
ANION GAP SERPL CALC-SCNC: 7 MMOL/L
ANION GAP SERPL CALC-SCNC: 8 MMOL/L
ANISOCYTOSIS BLD QL SMEAR: SLIGHT
ANISOCYTOSIS BLD QL SMEAR: SLIGHT
AST SERPL-CCNC: 58 U/L
AST SERPL-CCNC: 64 U/L
BACTERIA BLD CULT: NORMAL
BACTERIA BLD CULT: NORMAL
BACTERIA UR CULT: NO GROWTH
BASO STIPL BLD QL SMEAR: ABNORMAL
BASOPHILS # BLD AUTO: 0.03 K/UL
BASOPHILS # BLD AUTO: 0.04 K/UL
BASOPHILS NFR BLD: 0.7 %
BASOPHILS NFR BLD: 0.9 %
BILIRUB SERPL-MCNC: 2.1 MG/DL
BILIRUB SERPL-MCNC: 2.3 MG/DL
BLD PROD TYP BPU: NORMAL
BLOOD UNIT EXPIRATION DATE: NORMAL
BLOOD UNIT TYPE CODE: 6200
BLOOD UNIT TYPE: NORMAL
BUN SERPL-MCNC: 51 MG/DL
BUN SERPL-MCNC: 51 MG/DL
BUN SERPL-MCNC: 53 MG/DL
BUN SERPL-MCNC: 55 MG/DL
BURR CELLS BLD QL SMEAR: ABNORMAL
CALCIUM SERPL-MCNC: 9 MG/DL
CALCIUM SERPL-MCNC: 9 MG/DL
CALCIUM SERPL-MCNC: 9.1 MG/DL
CALCIUM SERPL-MCNC: 9.4 MG/DL
CHLORIDE SERPL-SCNC: 115 MMOL/L
CHLORIDE SERPL-SCNC: 117 MMOL/L
CO2 SERPL-SCNC: 16 MMOL/L
CO2 SERPL-SCNC: 16 MMOL/L
CO2 SERPL-SCNC: 17 MMOL/L
CO2 SERPL-SCNC: 17 MMOL/L
CODING SYSTEM: NORMAL
CREAT SERPL-MCNC: 2.6 MG/DL
CREAT SERPL-MCNC: 2.8 MG/DL
DACRYOCYTES BLD QL SMEAR: ABNORMAL
DACRYOCYTES BLD QL SMEAR: ABNORMAL
DIFFERENTIAL METHOD: ABNORMAL
DIFFERENTIAL METHOD: ABNORMAL
DISPENSE STATUS: NORMAL
EOSINOPHIL # BLD AUTO: 0.3 K/UL
EOSINOPHIL # BLD AUTO: 0.4 K/UL
EOSINOPHIL NFR BLD: 8.5 %
EOSINOPHIL NFR BLD: 9.3 %
ERYTHROCYTE [DISTWIDTH] IN BLOOD BY AUTOMATED COUNT: 20.2 %
ERYTHROCYTE [DISTWIDTH] IN BLOOD BY AUTOMATED COUNT: 20.8 %
EST. GFR  (AFRICAN AMERICAN): 26.7 ML/MIN/1.73 M^2
EST. GFR  (AFRICAN AMERICAN): 29.2 ML/MIN/1.73 M^2
EST. GFR  (NON AFRICAN AMERICAN): 23.1 ML/MIN/1.73 M^2
EST. GFR  (NON AFRICAN AMERICAN): 25.3 ML/MIN/1.73 M^2
GLUCOSE SERPL-MCNC: 136 MG/DL
GLUCOSE SERPL-MCNC: 136 MG/DL
GLUCOSE SERPL-MCNC: 165 MG/DL
GLUCOSE SERPL-MCNC: 199 MG/DL
HCT VFR BLD AUTO: 23.5 %
HCT VFR BLD AUTO: 25.6 %
HGB BLD-MCNC: 7.9 G/DL
HGB BLD-MCNC: 8.7 G/DL
HYPOCHROMIA BLD QL SMEAR: ABNORMAL
HYPOCHROMIA BLD QL SMEAR: ABNORMAL
IMM GRANULOCYTES # BLD AUTO: 0.02 K/UL
IMM GRANULOCYTES # BLD AUTO: 0.07 K/UL
IMM GRANULOCYTES NFR BLD AUTO: 0.5 %
IMM GRANULOCYTES NFR BLD AUTO: 1.6 %
INR PPP: 1.6
LACTATE SERPL-SCNC: 1.9 MMOL/L
LYMPHOCYTES # BLD AUTO: 1.1 K/UL
LYMPHOCYTES # BLD AUTO: 1.1 K/UL
LYMPHOCYTES NFR BLD: 23.9 %
LYMPHOCYTES NFR BLD: 28.2 %
MAGNESIUM SERPL-MCNC: 2.8 MG/DL
MCH RBC QN AUTO: 34.5 PG
MCH RBC QN AUTO: 34.6 PG
MCHC RBC AUTO-ENTMCNC: 33.6 G/DL
MCHC RBC AUTO-ENTMCNC: 34 G/DL
MCV RBC AUTO: 102 FL
MCV RBC AUTO: 103 FL
MONOCYTES # BLD AUTO: 0.7 K/UL
MONOCYTES # BLD AUTO: 0.8 K/UL
MONOCYTES NFR BLD: 16.2 %
MONOCYTES NFR BLD: 20.4 %
NEUTROPHILS # BLD AUTO: 1.7 K/UL
NEUTROPHILS # BLD AUTO: 2.2 K/UL
NEUTROPHILS NFR BLD: 41.7 %
NEUTROPHILS NFR BLD: 48.1 %
NRBC BLD-RTO: 0 /100 WBC
NRBC BLD-RTO: 0 /100 WBC
OVALOCYTES BLD QL SMEAR: ABNORMAL
OVALOCYTES BLD QL SMEAR: ABNORMAL
PAPPENHEIMER BOD BLD QL SMEAR: PRESENT
PHOSPHATE SERPL-MCNC: 4.2 MG/DL
PHOSPHATE SERPL-MCNC: 4.4 MG/DL
PLATELET # BLD AUTO: 31 K/UL
PLATELET # BLD AUTO: 34 K/UL
PLATELET BLD QL SMEAR: ABNORMAL
PMV BLD AUTO: ABNORMAL FL
PMV BLD AUTO: ABNORMAL FL
POIKILOCYTOSIS BLD QL SMEAR: SLIGHT
POIKILOCYTOSIS BLD QL SMEAR: SLIGHT
POLYCHROMASIA BLD QL SMEAR: ABNORMAL
POLYCHROMASIA BLD QL SMEAR: ABNORMAL
POTASSIUM SERPL-SCNC: 4 MMOL/L
POTASSIUM SERPL-SCNC: 4 MMOL/L
POTASSIUM SERPL-SCNC: 4.1 MMOL/L
POTASSIUM SERPL-SCNC: 4.1 MMOL/L
PROT SERPL-MCNC: 4.7 G/DL
PROT SERPL-MCNC: 5 G/DL
PROTHROMBIN TIME: 16 SEC
RBC # BLD AUTO: 2.28 M/UL
RBC # BLD AUTO: 2.52 M/UL
SCHISTOCYTES BLD QL SMEAR: ABNORMAL
SODIUM SERPL-SCNC: 139 MMOL/L
SODIUM SERPL-SCNC: 142 MMOL/L
SPHEROCYTES BLD QL SMEAR: ABNORMAL
TRANS ERYTHROCYTES VOL PATIENT: NORMAL ML
WBC # BLD AUTO: 4.01 K/UL
WBC # BLD AUTO: 4.51 K/UL

## 2018-07-18 PROCEDURE — 25000003 PHARM REV CODE 250: Mod: NTX | Performed by: PHYSICIAN ASSISTANT

## 2018-07-18 PROCEDURE — 83605 ASSAY OF LACTIC ACID: CPT | Mod: NTX

## 2018-07-18 PROCEDURE — 36430 TRANSFUSION BLD/BLD COMPNT: CPT | Mod: NTX

## 2018-07-18 PROCEDURE — 25000003 PHARM REV CODE 250: Mod: NTX | Performed by: STUDENT IN AN ORGANIZED HEALTH CARE EDUCATION/TRAINING PROGRAM

## 2018-07-18 PROCEDURE — 99233 SBSQ HOSP IP/OBS HIGH 50: CPT | Mod: NTX,,, | Performed by: PHYSICIAN ASSISTANT

## 2018-07-18 PROCEDURE — 25000003 PHARM REV CODE 250: Mod: NTX | Performed by: INTERNAL MEDICINE

## 2018-07-18 PROCEDURE — 85025 COMPLETE CBC W/AUTO DIFF WBC: CPT | Mod: NTX

## 2018-07-18 PROCEDURE — 25000003 PHARM REV CODE 250: Mod: NTX | Performed by: NURSE PRACTITIONER

## 2018-07-18 PROCEDURE — 97530 THERAPEUTIC ACTIVITIES: CPT | Mod: NTX

## 2018-07-18 PROCEDURE — 97535 SELF CARE MNGMENT TRAINING: CPT | Mod: NTX

## 2018-07-18 PROCEDURE — 84100 ASSAY OF PHOSPHORUS: CPT | Mod: 91,NTX

## 2018-07-18 PROCEDURE — P9021 RED BLOOD CELLS UNIT: HCPCS | Mod: NTX

## 2018-07-18 PROCEDURE — 85610 PROTHROMBIN TIME: CPT | Mod: NTX

## 2018-07-18 PROCEDURE — 83735 ASSAY OF MAGNESIUM: CPT | Mod: NTX

## 2018-07-18 PROCEDURE — 80053 COMPREHEN METABOLIC PANEL: CPT | Mod: NTX

## 2018-07-18 PROCEDURE — 63600175 PHARM REV CODE 636 W HCPCS: Mod: NTX | Performed by: PHYSICIAN ASSISTANT

## 2018-07-18 PROCEDURE — 80053 COMPREHEN METABOLIC PANEL: CPT | Mod: 91,NTX

## 2018-07-18 PROCEDURE — 80048 BASIC METABOLIC PNL TOTAL CA: CPT | Mod: NTX

## 2018-07-18 PROCEDURE — 20600001 HC STEP DOWN PRIVATE ROOM: Mod: NTX

## 2018-07-18 PROCEDURE — 63600175 PHARM REV CODE 636 W HCPCS: Mod: NTX | Performed by: NURSE PRACTITIONER

## 2018-07-18 PROCEDURE — 84100 ASSAY OF PHOSPHORUS: CPT | Mod: NTX

## 2018-07-18 RX ORDER — SODIUM BICARBONATE 650 MG/1
1950 TABLET ORAL 3 TIMES DAILY
Status: DISCONTINUED | OUTPATIENT
Start: 2018-07-18 | End: 2018-07-24

## 2018-07-18 RX ORDER — VANCOMYCIN HCL IN 5 % DEXTROSE 1G/250ML
1000 PLASTIC BAG, INJECTION (ML) INTRAVENOUS ONCE
Status: COMPLETED | OUTPATIENT
Start: 2018-07-18 | End: 2018-07-18

## 2018-07-18 RX ORDER — HYDROCODONE BITARTRATE AND ACETAMINOPHEN 500; 5 MG/1; MG/1
TABLET ORAL
Status: DISCONTINUED | OUTPATIENT
Start: 2018-07-18 | End: 2018-07-22

## 2018-07-18 RX ORDER — ZINC SULFATE 50(220)MG
220 CAPSULE ORAL 2 TIMES DAILY
Status: DISCONTINUED | OUTPATIENT
Start: 2018-07-18 | End: 2018-07-29

## 2018-07-18 RX ADMIN — ASPIRIN 81 MG: 81 TABLET, COATED ORAL at 08:07

## 2018-07-18 RX ADMIN — FLUCONAZOLE 200 MG: 200 TABLET ORAL at 08:07

## 2018-07-18 RX ADMIN — PANTOPRAZOLE SODIUM 40 MG: 40 TABLET, DELAYED RELEASE ORAL at 08:07

## 2018-07-18 RX ADMIN — CEFEPIME 1 G: 1 INJECTION, POWDER, FOR SOLUTION INTRAMUSCULAR; INTRAVENOUS at 06:07

## 2018-07-18 RX ADMIN — Medication 220 MG: at 09:07

## 2018-07-18 RX ADMIN — SODIUM BICARBONATE 650 MG TABLET 1300 MG: at 08:07

## 2018-07-18 RX ADMIN — LEVETIRACETAM 500 MG: 500 TABLET, FILM COATED ORAL at 09:07

## 2018-07-18 RX ADMIN — OXYCODONE HYDROCHLORIDE AND ACETAMINOPHEN 1000 MG: 500 TABLET ORAL at 08:07

## 2018-07-18 RX ADMIN — Medication 1000 MG: at 04:07

## 2018-07-18 RX ADMIN — SODIUM BICARBONATE 650 MG TABLET 1950 MG: at 09:07

## 2018-07-18 RX ADMIN — SERTRALINE HYDROCHLORIDE 50 MG: 50 TABLET ORAL at 08:07

## 2018-07-18 RX ADMIN — LIDOCAINE 2 PATCH: 50 PATCH TOPICAL at 12:07

## 2018-07-18 RX ADMIN — LACTULOSE 45 G: 20 SOLUTION ORAL at 09:07

## 2018-07-18 RX ADMIN — LACTULOSE 45 G: 20 SOLUTION ORAL at 03:07

## 2018-07-18 RX ADMIN — LEVETIRACETAM 500 MG: 500 TABLET, FILM COATED ORAL at 08:07

## 2018-07-18 RX ADMIN — RIFAXIMIN 550 MG: 550 TABLET ORAL at 09:07

## 2018-07-18 RX ADMIN — PANTOPRAZOLE SODIUM 40 MG: 40 TABLET, DELAYED RELEASE ORAL at 09:07

## 2018-07-18 RX ADMIN — RIFAXIMIN 550 MG: 550 TABLET ORAL at 08:07

## 2018-07-18 RX ADMIN — OMEGA-3-ACID ETHYL ESTERS 2 G: 1 CAPSULE, LIQUID FILLED ORAL at 08:07

## 2018-07-18 RX ADMIN — LACTULOSE 45 G: 20 SOLUTION ORAL at 08:07

## 2018-07-18 RX ADMIN — DOCUSATE SODIUM 100 MG: 100 CAPSULE, LIQUID FILLED ORAL at 08:07

## 2018-07-18 RX ADMIN — Medication 220 MG: at 12:07

## 2018-07-18 RX ADMIN — HEPARIN SODIUM 5000 UNITS: 5000 INJECTION, SOLUTION INTRAVENOUS; SUBCUTANEOUS at 10:07

## 2018-07-18 RX ADMIN — LACTULOSE 200 G: 10 SOLUTION ORAL at 05:07

## 2018-07-18 RX ADMIN — ATORVASTATIN CALCIUM 40 MG: 20 TABLET, FILM COATED ORAL at 08:07

## 2018-07-18 RX ADMIN — CEFEPIME 1 G: 1 INJECTION, POWDER, FOR SOLUTION INTRAMUSCULAR; INTRAVENOUS at 05:07

## 2018-07-18 RX ADMIN — SODIUM BICARBONATE 650 MG TABLET 1950 MG: at 04:07

## 2018-07-18 NOTE — PROGRESS NOTES
[facility] Hospital Medicine  Progress Note    Patient Name: Alon Adamson  MRN: [id number]  Patient Class: IP- Inpatient   Admission Date: 6/22/2018  Length of Stay: 26 days  Attending Physician: Elan Guerra MD  Primary Care Provider: Mckinley Vides MD    Hospital Medicine Team: [facility] LIVER TRANSPLANT Chichi Urbina MD    Subjective:     Principal Problem:Acute kidney injury superimposed on chronic kidney disease    HPI:  Patient is 64 yo male with PMH of decompensated alcoholic cirrhosis, CKD III, CAD which was admitted on 6/22 due to BLE edema and VICKY on CKD. Patient was consulted on worsening VICKY despite lasix and albumin challenge. Patient's baseline cr was 1.5. Patient's course of tretment was complicated by an episode of hepatic encephalopathy which took patient to ICU, 2 days later patient stepped down to transplant unit. Patient also received 7 days of vancomycin for cellulitis treatment.     Hospital Course:  No notes on file    Interval History: 7/18    Review of Systems   Constitutional: Positive for activity change. Negative for fever.   HENT: Negative for drooling and trouble swallowing.    Respiratory: Negative for cough, choking, chest tightness, shortness of breath, wheezing and stridor.    Cardiovascular: Positive for leg swelling. Negative for chest pain and palpitations.   Gastrointestinal: Negative for abdominal distention, abdominal pain, constipation, nausea and vomiting.   Genitourinary: Negative for dysuria and hematuria.   Skin: Negative for color change and pallor.   Neurological: Negative for headaches.   Psychiatric/Behavioral: Negative for agitation, confusion and sleep disturbance.     Objective:     Vital Signs (Most Recent):  Temp: 99.3 °F (37.4 °C) (07/17/18 1118)  Pulse: 94 (07/17/18 1118)  Resp: 16 (07/17/18 1118)  BP: (!) 125/55 (07/17/18 1118)  SpO2: 95 % (07/17/18 1118) Vital Signs (24h Range):  Temp:  [92.8 °F (33.8 °C)-99.3 °F (37.4 °C)] 99.3 °F (37.4  °C)  Pulse:  [66-94] 94  Resp:  [16-18] 16  SpO2:  [95 %-99 %] 95 %  BP: (113-138)/(52-63) 125/55     Weight: 126.3 kg (278 lb 7.1 oz)  Body mass index is 35.75 kg/m².    Intake/Output Summary (Last 24 hours) at 07/17/18 1409  Last data filed at 07/17/18 0600   Gross per 24 hour   Intake            642.5 ml   Output              550 ml   Net             92.5 ml      Physical Exam   Constitutional: He appears well-developed and well-nourished. No distress.   HENT:   Head: Normocephalic and atraumatic.   Eyes: Pupils are equal, round, and reactive to light.   Neck: Neck supple. No JVD present.   Cardiovascular: Normal rate, regular rhythm, normal heart sounds and intact distal pulses.  Exam reveals no friction rub.    No murmur heard.  Pulmonary/Chest: Effort normal and breath sounds normal. No stridor. No respiratory distress. He has no wheezes. He has no rales. He exhibits no tenderness.   Abdominal: Soft. Bowel sounds are normal. He exhibits no distension. There is no tenderness.   Musculoskeletal: Normal range of motion. He exhibits edema (Edema in all extremeties). He exhibits no tenderness or deformity.   Neurological: He is alert.   Skin: Skin is warm and dry. Capillary refill takes less than 2 seconds. He is not diaphoretic.   BLE scaling and dried patches       Significant Labs:   BMP:   Recent Labs  Lab 07/17/18  0500   *      K 4.5   *   CO2 15*   BUN 47*   CREATININE 2.2*   CALCIUM 9.0   MG 3.2*     CBC:   Recent Labs  Lab 07/16/18  0459 07/17/18  0500   WBC 3.90 3.33*   HGB 7.4* 7.9*   HCT 22.8* 23.5*   PLT 34* 33*       Chest Xray (7/17) Left side consolidation/ PE    Assessment/Plan:      * Acute kidney injury superimposed on chronic kidney disease    - Avoid nephrotoxic medications  - maintain MAP > 65  - Hb > 7 mg/dl  - Strict I/O and chart  - Daily weights and chart  - Urine sample spin  - continue Lasix 80 mg, BID  7/18 Lasix DCed yesterday, patient had 100 cc urine in the bag  while I saw him in the am. Cr has increased 0.4 point  - will ask nursing staff to weight patient more often              VTE Risk Mitigation         Ordered     heparin (porcine) injection 5,000 Units  Every 8 hours      06/22/18 1502     IP VTE HIGH RISK PATIENT  Once      06/22/18 1502              Chichi Urbina MD  Nephrology   [facility]

## 2018-07-18 NOTE — ASSESSMENT & PLAN NOTE
- completed vanc x 7 days for BLE cellulitis  - 2D Echo 7/9 normal EF (55-60%), trivial tricuspid/mitral regurg.  - diuretics on hold, due to rise in CR, monitoring closely

## 2018-07-18 NOTE — PROGRESS NOTES
[facility]  Liver Transplant  Progress Note    Patient Name: Alon Adamson  MRN: [id number]  Admission Date: 6/22/2018  Hospital Length of Stay: 26 days  Code Status: Full Code  Primary Care Provider: Mckinley Vides MD    Subjective:     History of Present Illness:  Mr. 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  Complications of liver disease include hyperbilirubinemia, hypoalbuminemia, severe malnutrition,   hepatic encephalopathy, thrombocytopenia, splenomegaly, ascites, hypervolemia, coagulopathy, portal HTN.  Pt recently admitted for severe hepatic encephalopathy requiring intubation but has been stable from mentation standpoint post resolution of acute enceophalopathy. Mr. Adamson was seen in clinic 6/22 and was noted to have significant hypervolemia, weeping from BLE, and VICKY on CKD3 on routine labs.  His MELD increased from 22 to 26 per labs- coordinator notified and MELD updated.  Cr elevated to 2.3 from baseline of 1.4.  Also with worsening ascites.  Pt reported increased pruritis, generalized fatigue and weakness.  He presented in wheelchair.  ROS otherwise negative.      Hospital Course:  Patient is listed for liver transplant, placed on internal hold 6/25 for HE then status 7 on 6/26 due to change in prescription coverage. He was re-activated on the list 7/3, MELD 22. Pt started on IV diuretics 6/22 and continued 6/23 and 6/24.  BLE edema and ascites signif improved with diuresis and kidney function also improved. During admission, patient had worsening encephalopathy and asterixis prompting infectious work up, all blood and urine cultures obtained during admission with NGTD. Intermittently, encephalopathy severe enough to warrant lactulose enemas. CT head obtained and negative. Paracentesis 6/25 negative for peritonitis per cell count and U/A negative. Pt also with concern for BLE cellulitis- upper legs bright red with lower BLE still with dark  appearance of venous stasis. Placed on vancomycin with improvement in BLE redness 6/26. Vanc continued for 7 days (ended 7/1). His HE waxed and waned for several days requiring lactulose enemas. Pt eating minimally during period of encephalopathy requiring gentle hydration with IVF 6/26. Of note, taco placement attempted 6/26 but unable secondary to agitation upon insertion prompting self resolving nose bleed. Micro lab called 6/26 PM with blood cx + for G+C in blood- pt continued on vanc which was started 6/25. ID consulted, suspect + blood cultures contaminant. EEG obtained 6/26 as pt with h/o serizures and negative. Pt continued on home Keppra. VICKY on admission initially improved with diuresis, but Cr sakina intermittently during admission likely related to aggressive diuresis. Of note, with chest pain overnight 6/26-6/27 that resolved without intervention and pt reported as mild.  EKG NSR with PVC, troponin 0.1 in setting of hypervolemia + VICKY.  Normal dobutamine stress 1/2018.  Cardiology consulted and felt not ACS, no need for further workup other than continue to treat current risk factors for CAD with ASA (pt already on ASA and statin as outpt) + BB for portal hypertension which was started. Repeat paracentesis 7/10, 4.8L off, no SBP. Hypernatremia noted 7/9, nephrology consulted to aid in management. Hypernatremia attributed to dehydration from frequent BM's, diuretics were held, d/c'd creon due to excessive diarrhea, and patient was treated with increasing po free water and IVF. Echo 7/9 with normal EF, no wall abnormality, mild tricuspid/mitral regurg. Was sent to ICU 7/13/18 for lethargy/HE with hypotension and hypothermia. Received lactulose enemas and albumin bolus with improvement in mental status and BPS. Started on BS abx (cefepime/vanc/fluc), although Bcx and Ucx both NGTD.     Interval History: No acute events overnight. Patient more alert this AM. Oriented x 3. MELD 24 from 22, will remeld. Cultures  NGTD, but will continue on cefepime/fluc for now. H/H decreased will transfuse1 unit PRBC.  Of note, patient with hypothermia again this afternoon. Remains oriented. Bear hugger applied along with heat packs. Repeat labs ordered, pending. Vanc x 1 ordered. Will also consult ID for help with infectious work up given ongoing hypothermia.     Scheduled Meds:   ascorbic acid (vitamin C)  1,000 mg Oral Daily    aspirin  81 mg Oral Daily    atorvastatin  40 mg Oral Daily    ceFEPime (MAXIPIME) IVPB  1 g Intravenous Q12H    docusate sodium  100 mg Oral Daily    ergocalciferol  50,000 Units Oral Q7 Days    fluconazole  200 mg Oral Daily    heparin (porcine)  5,000 Units Subcutaneous Q8H    lactulose  45 g Oral TID    levETIRAcetam  500 mg Oral BID    lidocaine  2 patch Transdermal Q24H    omega-3 acid ethyl esters  2 g Oral Daily with breakfast    pantoprazole  40 mg Oral BID    rifAXIMin  550 mg Oral BID    sertraline  50 mg Oral Daily    sodium bicarbonate  1,950 mg Oral TID    vancomycin (VANCOCIN) IVPB  1,000 mg Intravenous Once    zinc sulfate  220 mg Oral BID     Continuous Infusions:  PRN Meds:sodium chloride, sodium chloride, acetaminophen, diphenhydrAMINE-zinc acetate 1-0.1%, lactulose, metoclopramide HCl, ondansetron, sodium chloride 0.9%, traMADol    Review of Systems   Constitutional: Positive for appetite change and fatigue. Negative for activity change, chills and fever.   HENT: Negative for mouth sores, sore throat and trouble swallowing.    Eyes: Negative for visual disturbance.   Respiratory: Positive for shortness of breath (upon activity). Negative for wheezing.    Cardiovascular: Positive for leg swelling. Negative for chest pain.   Gastrointestinal: Positive for diarrhea (lactulose). Negative for abdominal distention, abdominal pain, nausea and vomiting.   Endocrine: Negative for polydipsia and polyuria.   Genitourinary: Negative for decreased urine volume and difficulty urinating.    Musculoskeletal: Positive for arthralgias (both shoulders have been hurting for last 2 weeks, right > left, improved with lidocaine patches).   Skin: Negative for rash.   Neurological: Positive for weakness. Negative for dizziness, syncope and headaches.   Hematological: Bruises/bleeds easily.   Psychiatric/Behavioral: Negative for agitation, confusion, hallucinations and sleep disturbance.     Objective:     Vital Signs (Most Recent):  Temp: (!) 94.7 °F (34.8 °C) (07/18/18 1450)  Pulse: 64 (07/18/18 1330)  Resp: 18 (07/18/18 1330)  BP: 136/61 (07/18/18 1330)  SpO2: 100 % (07/18/18 1200) Vital Signs (24h Range):  Temp:  [93.7 °F (34.3 °C)-98.6 °F (37 °C)] 94.7 °F (34.8 °C)  Pulse:  [64-96] 64  Resp:  [16-18] 18  SpO2:  [95 %-100 %] 100 %  BP: (109-136)/(54-63) 136/61     Weight: 126.3 kg (278 lb 7.1 oz)  Body mass index is 35.75 kg/m².    Intake/Output - Last 3 Shifts       07/16 0700 - 07/17 0659 07/17 0700 - 07/18 0659 07/18 0700 - 07/19 0659    P.O. 480      Blood 162.5      Total Intake(mL/kg) 642.5 (5.1)      Urine (mL/kg/hr) 550 (0.2) 175 (0.1)     Total Output 550 175      Net +92.5 -175             Stool Occurrence 4 x 7 x 2 x          Physical Exam   Constitutional: He appears well-developed and well-nourished. He appears lethargic. He is cooperative.   HENT:   Head: Normocephalic and atraumatic.   Mouth/Throat: No oropharyngeal exudate.   Eyes: EOM are normal. Pupils are equal, round, and reactive to light. Scleral icterus is present.   Neck: No JVD present.   Cardiovascular: Normal rate, regular rhythm and intact distal pulses.  Exam reveals no gallop and no friction rub.    Murmur heard.  Pulmonary/Chest: Effort normal and breath sounds normal. No stridor. No respiratory distress. He has no wheezes. He has no rales.   Abdominal: Soft. Bowel sounds are normal. He exhibits no distension and no mass.   Musculoskeletal: He exhibits edema (+3 generalized edema ). He exhibits no tenderness.  "  Lymphadenopathy:     He has no cervical adenopathy.   Neurological: He appears lethargic.   + asterixis and baseline tremor    Skin: Skin is warm and dry. No rash noted. No erythema.   BLE with chronic venous stasis changes, crusty/flaky skin intact without wounds   Psychiatric: He has a normal mood and affect. His speech is delayed. He is slowed.   Nursing note and vitals reviewed.      Laboratory:  Immunosuppressants     None        CBC:     Recent Labs  Lab 07/18/18 0430   WBC 4.01   RBC 2.28*   HGB 7.9*   HCT 23.5*   PLT 34*   *   MCH 34.6*   MCHC 33.6     CMP:     Recent Labs  Lab 07/18/18 0430   *  136*   CALCIUM 9.0  9.0   ALBUMIN 2.3*   PROT 4.7*     139   K 4.0  4.0   CO2 16*  16*   *  115*   BUN 51*  51*   CREATININE 2.6*  2.6*   ALKPHOS 92   ALT 38   AST 58*   BILITOT 2.1*     Coagulation:     Recent Labs  Lab 07/18/18 0430   INR 1.6*     Labs within the past 24 hours have been reviewed.    Diagnostic Results:  pertinent imaging reviewed    Assessment/Plan:     * Acute kidney injury superimposed on chronic kidney disease    - VICKY initially improving with albumin infusion but with minimal intake 6/25 and 6/26.  - dc sodium bicarb due to hypernatremia  - nephrology consulted due to hypervolemia and multiple electrolyte abnormalities (acidosis, hypernatremia)  - hold diuretics as worse CR        Severe protein-calorie malnutrition    - Dietary consulted for calorie count.  - Pt eating better accord to family.  Does take boost supplement.  - "pre-hab" regimen and supplements ordered. Dc creon due to excessive diarrhea  - Patient drinking at least 2-3 boost supplements and beneprotein daily.   - If continues to have low intake, consider starting Megace.          Physical deconditioning    - PT/OT consulted.  - encourage OOBTC for several hours each day, walking with walker with PT        Ascites due to alcoholic cirrhosis    - Para 6/25 negative for infection   - Para " 7/10 negative for infection          Coagulopathy    - Secondary to decompensated liver disease.        Cholestatic pruritus    - cholestyramine has previously been ineffective, given depressed mood; will initiate zoloft for mood and pruritus.        Hypoalbuminemia due to protein-calorie malnutrition    - see severe protein-calorie malnutrition        Pancytopenia    - related to decompensated liver disease        Decompensated hepatic cirrhosis    - Placed status 7 on 6/26 for prescription drug coverage change. Re-activated 7/3, MELD 22. Back on hold d/t frailty and decompensation at the end of last week prompting ICU transfer. Now off hold 7/17.  Plan to submit MELD exception points. Will remeld today at 24.   - dc propanolol    MELD-Na score: 24 at 7/18/2018  4:30 AM  MELD score: 24 at 7/18/2018  4:30 AM  Calculated from:  Serum Creatinine: 2.6 mg/dL at 7/18/2018  4:30 AM  Serum Sodium: 139 mmol/L (Rounded to 137) at 7/18/2018  4:30 AM  Total Bilirubin: 2.1 mg/dL at 7/18/2018  4:30 AM  INR(ratio): 1.6 at 7/18/2018  4:30 AM  Age: 63 years        Coronary artery disease involving native coronary artery of native heart without angina pectoris    - Continue home ASA.  - 2D Echo 7/9 normal EF (55-60%), trivial tricuspid/mitral regurg.        Anemia of chronic disease    - H&H decreased, will transfuse 1 unit of PRBC today 7/18.   - 1 unit PRBC transfused 7/16/18.  - monitor.         Thrombocytopenia    - Secondary to splenomegaly from portal HTN- should improve with txp.  - No s/s overt bleed.        Seizures    - Oral Keppra transitioned to IV as pt not able to swallow 6/26.  - Resumed oral Keppra since mentation improved.  - EEG 6/26 negative.        Bilateral edema of lower extremity    - completed vanc x 7 days for BLE cellulitis  - 2D Echo 7/9 normal EF (55-60%), trivial tricuspid/mitral regurg.  - diuretics on hold, due to rise in CR, monitoring closely         Hepatic encephalopathy    - ESLD secondary to EtOH  cirrhosis with severe complications (hyperbilirubinemia, hypoalbuminemia, severe malnutrition, hepatic encephalopathy, thrombocytopenia, splenomegaly, ascites, hypervolemia, coaguloopathy, portal HTN), seizure disorder on Keppra, CKD3, CAD   - has frequent episodes of lethargy/confusion/slowing when holding lactulose   - admitted to SICU 7/13/18 for worsening encephalopathy and hypothermia  - PO lactulose TID, PRN lactulose enemas, Rifaximin  - more alert today. Continue oral lactulose and PRN lactulose enemas.              VTE Risk Mitigation         Ordered     heparin (porcine) injection 5,000 Units  Every 8 hours      06/22/18 1502     IP VTE HIGH RISK PATIENT  Once      06/22/18 1502          The patients clinical status was discussed at multidisplinary rounds, involving transplant surgery, transplant medicine, pharmacy, nursing, nutrition, and social work    Discharge Planning: Not a candidate for discharge at this time.   No Patient Care Coordination Note on file.      Jyothi Cherry PA-C  Liver Transplant  [facility]

## 2018-07-18 NOTE — TELEPHONE ENCOUNTER
PATIENT NAME: Alon Muller [facility] #: [id number]    Lab Results   Component Value Date    CREATININE 2.6 (H) 07/18/2018    CREATININE 2.6 (H) 07/18/2018     07/18/2018     07/18/2018    BILITOT 2.1 (H) 07/18/2018    ALBUMIN 2.3 (L) 07/18/2018    INR 1.6 (H) 07/18/2018     MELD 24  Encephalopathy: 1 - 2  Ascites: moderate  Dialysis: no     Recertification requestor: Yady Dietz

## 2018-07-18 NOTE — ASSESSMENT & PLAN NOTE
- ESLD secondary to EtOH cirrhosis with severe complications (hyperbilirubinemia, hypoalbuminemia, severe malnutrition, hepatic encephalopathy, thrombocytopenia, splenomegaly, ascites, hypervolemia, coaguloopathy, portal HTN), seizure disorder on Keppra, CKD3, CAD   - has frequent episodes of lethargy/confusion/slowing when holding lactulose   - admitted to SICU 7/13/18 for worsening encephalopathy and hypothermia  - PO lactulose TID, PRN lactulose enemas, Rifaximin  - more alert today. Continue oral lactulose and PRN lactulose enemas.

## 2018-07-18 NOTE — ASSESSMENT & PLAN NOTE
- H&H decreased, will transfuse 1 unit of PRBC today 7/18.   - 1 unit PRBC transfused 7/16/18.  - monitor.

## 2018-07-18 NOTE — PLAN OF CARE
Problem: Patient Care Overview  Goal: Plan of Care Review  Outcome: Ongoing (interventions implemented as appropriate)  AAOx4, VSS, temp- 96.9-98.9 oral, hypothermic blanket remains in place.   2 BM overnight, pt shows no signs of lethargy. Barrier cream applied to bottom.  UOP remains minimal overnight- total=150. Diuretics held for Cr elevation 2.6, BMP checked q8h.  UA resulted- 73 WBC and + for bacteria. UC + sterile urine sample results pending.  BC NGTD.   Bed in lowest position, non skid socks worn, call light remains within reach. Will continue to monitor.

## 2018-07-18 NOTE — NURSING
Reported to MIROSLAVA Cherry-PA pt's rectal temp was 94.7. Will continue to monitor and re-take in an hour.

## 2018-07-18 NOTE — PT/OT/SLP PROGRESS
Occupational Therapy   Treatment    Name: Alon Adamson  MRN: [id number]  Admitting Diagnosis:  Acute kidney injury superimposed on chronic kidney disease       Recommendations:     Discharge Recommendations: nursing facility, skilled  Discharge Equipment Recommendations:  none  Barriers to discharge:  None    Subjective     Communicated with: RN prior to session.  Pain/Comfort:  Pain Rating 1: 0/10  Pain Rating Post-Intervention 1: 0/10    Patients cultural, spiritual, Moravian conflicts given the current situation: none    Objective:     Patient found with: telemetry, griffin catheter    General Precautions: Standard, fall   Orthopedic Precautions:N/A   Braces: N/A     Occupational Performance:    Bed Mobility:    · Patient completed Scooting/Bridging with contact guard assistance  · Patient completed Supine to Sit with contact guard assistance  · Patient completed Sit to Supine with contact guard assistance     Functional Mobility/Transfers:  · Patient completed Sit <> Stand Transfer with minimum assistance  with  rolling walker   · Patient completed Toilet Transfer Stand Pivot technique with contact guard assistance with  rolling walker and bedside commode  · Functional Mobility: Pt ambulated ~3-5 ft at cga w/ RW.    Activities of Daily Living:  · Upper Body Dressing: maximal assistance donned gown in front while seted EOB  · Lower Body Dressing: maximal assistance donned socks seated EOB  · Toileting: maximal assistance susanna-anal care while standing in front of commode.     Patient left up in chair with all lines intact, call button in reach and mother and RN present    [facility] 6 Click:  [facility] Total Score: 14    Treatment & Education:  Pt educated on POC.  Pt able to hold RUE shoulder flx ~90 degrees for 10 seconds and LUE shoulder flx ~90 degrees for 5 seconds. Pt required min a to achieve shoulder flx.   Pt completed 2 sit<>stand at min a w/ RW.  Pt completed static stand ~3 minutes at cga w/ RW.     Education:    Assessment:     Alon Adamson is a 63 y.o. male with a medical diagnosis of Acute kidney injury superimposed on chronic kidney disease.  He presents with impairments listed below. Pt did well to tolerate and participate in session. Pt displayed general global deconditioning requiring increased assist for ADLs and functional tasks at this time. Pt progressing towards goals. Pt displayed limited AROM of BUE. Pt would benefit from skilled OT services to improve independence and overall occupational functioning.      Performance deficits affecting function are weakness, impaired endurance, impaired self care skills, impaired functional mobilty, gait instability, impaired balance, decreased lower extremity function, decreased upper extremity function, impaired cardiopulmonary response to activity, decreased ROM, impaired skin.      Rehab Prognosis:  good; patient would benefit from acute skilled OT services to address these deficits and reach maximum level of function.       Plan:     Patient to be seen 4 x/week to address the above listed problems via self-care/home management, therapeutic activities, therapeutic exercises, neuromuscular re-education  · Plan of Care Expires: 07/28/18  · Plan of Care Reviewed with: patient, mother    This Plan of care has been discussed with the patient who was involved in its development and understands and is in agreement with the identified goals and treatment plan    GOALS:    Occupational Therapy Goals        Problem: Occupational Therapy Goal    Goal Priority Disciplines Outcome Interventions   Occupational Therapy Goal     OT, PT/OT Ongoing (interventions implemented as appropriate)    Description:  Goals to be met by: 7/30/18    Patient will increase functional independence with ADLs by performing:     Upper body dressing while seated EOB with supervision ( including snap/tie completion)  Grooming while standing at sink with Stand-by Assistance  Toileting  from toilet with Stand-by Assistance for hygiene and clothing management.  Toilet transfer to toilet with Stand-by Assistance.  Pt will complete a functional dynamic standing activity ~8 minutes without a rest break.   Pt will complete therapy session with minimal cues provided for re-direction.  Pt oriented to person, place, time, and situation with independence.                           Time Tracking:     OT Date of Treatment: 07/18/18  OT Start Time: 1027  OT Stop Time: 1112  OT Total Time (min): 45 min    Billable Minutes:Self Care/Home Management 30 minutes  Therapeutic Activity 15 minutes    Karthik Green, OT  7/18/2018

## 2018-07-18 NOTE — NURSING
Reported to DEVORAH De La Paz pt's rectal temp 93.7F. Pt is on warming blanket and bear hugger re-applied. This RN will place heating packs and additional blankets, will re-check temp in 30mins.

## 2018-07-18 NOTE — PLAN OF CARE
Problem: Patient Care Overview  Goal: Plan of Care Review  Outcome: Ongoing (interventions implemented as appropriate)  Pt is AAOX4-became lethargic during shift, VSS-hypothermia managed with heating blanket and bear hugger, and 2 person assist. Pt c/o of shoulder pain. Heparin held- platelet count low. Vanc started. Lactulose enema administered. Pt's mother at bedside. Pt's bed in lowest position, call bell within reach, and nonskid socks on.

## 2018-07-18 NOTE — SUBJECTIVE & OBJECTIVE
Scheduled Meds:   ascorbic acid (vitamin C)  1,000 mg Oral Daily    aspirin  81 mg Oral Daily    atorvastatin  40 mg Oral Daily    ceFEPime (MAXIPIME) IVPB  1 g Intravenous Q12H    docusate sodium  100 mg Oral Daily    ergocalciferol  50,000 Units Oral Q7 Days    fluconazole  200 mg Oral Daily    heparin (porcine)  5,000 Units Subcutaneous Q8H    lactulose  45 g Oral TID    levETIRAcetam  500 mg Oral BID    lidocaine  2 patch Transdermal Q24H    omega-3 acid ethyl esters  2 g Oral Daily with breakfast    pantoprazole  40 mg Oral BID    rifAXIMin  550 mg Oral BID    sertraline  50 mg Oral Daily    sodium bicarbonate  1,950 mg Oral TID    vancomycin (VANCOCIN) IVPB  1,000 mg Intravenous Once    zinc sulfate  220 mg Oral BID     Continuous Infusions:  PRN Meds:sodium chloride, sodium chloride, acetaminophen, diphenhydrAMINE-zinc acetate 1-0.1%, lactulose, metoclopramide HCl, ondansetron, sodium chloride 0.9%, traMADol    Review of Systems   Constitutional: Positive for appetite change and fatigue. Negative for activity change, chills and fever.   HENT: Negative for mouth sores, sore throat and trouble swallowing.    Eyes: Negative for visual disturbance.   Respiratory: Positive for shortness of breath (upon activity). Negative for wheezing.    Cardiovascular: Positive for leg swelling. Negative for chest pain.   Gastrointestinal: Positive for diarrhea (lactulose). Negative for abdominal distention, abdominal pain, nausea and vomiting.   Endocrine: Negative for polydipsia and polyuria.   Genitourinary: Negative for decreased urine volume and difficulty urinating.   Musculoskeletal: Positive for arthralgias (both shoulders have been hurting for last 2 weeks, right > left, improved with lidocaine patches).   Skin: Negative for rash.   Neurological: Positive for weakness. Negative for dizziness, syncope and headaches.   Hematological: Bruises/bleeds easily.   Psychiatric/Behavioral: Negative for  agitation, confusion, hallucinations and sleep disturbance.     Objective:     Vital Signs (Most Recent):  Temp: (!) 94.7 °F (34.8 °C) (07/18/18 1450)  Pulse: 64 (07/18/18 1330)  Resp: 18 (07/18/18 1330)  BP: 136/61 (07/18/18 1330)  SpO2: 100 % (07/18/18 1200) Vital Signs (24h Range):  Temp:  [93.7 °F (34.3 °C)-98.6 °F (37 °C)] 94.7 °F (34.8 °C)  Pulse:  [64-96] 64  Resp:  [16-18] 18  SpO2:  [95 %-100 %] 100 %  BP: (109-136)/(54-63) 136/61     Weight: 126.3 kg (278 lb 7.1 oz)  Body mass index is 35.75 kg/m².    Intake/Output - Last 3 Shifts       07/16 0700 - 07/17 0659 07/17 0700 - 07/18 0659 07/18 0700 - 07/19 0659    P.O. 480      Blood 162.5      Total Intake(mL/kg) 642.5 (5.1)      Urine (mL/kg/hr) 550 (0.2) 175 (0.1)     Total Output 550 175      Net +92.5 -175             Stool Occurrence 4 x 7 x 2 x          Physical Exam   Constitutional: He appears well-developed and well-nourished. He appears lethargic. He is cooperative.   HENT:   Head: Normocephalic and atraumatic.   Mouth/Throat: No oropharyngeal exudate.   Eyes: EOM are normal. Pupils are equal, round, and reactive to light. Scleral icterus is present.   Neck: No JVD present.   Cardiovascular: Normal rate, regular rhythm and intact distal pulses.  Exam reveals no gallop and no friction rub.    Murmur heard.  Pulmonary/Chest: Effort normal and breath sounds normal. No stridor. No respiratory distress. He has no wheezes. He has no rales.   Abdominal: Soft. Bowel sounds are normal. He exhibits no distension and no mass.   Musculoskeletal: He exhibits edema (+3 generalized edema ). He exhibits no tenderness.   Lymphadenopathy:     He has no cervical adenopathy.   Neurological: He appears lethargic.   + asterixis and baseline tremor    Skin: Skin is warm and dry. No rash noted. No erythema.   BLE with chronic venous stasis changes, crusty/flaky skin intact without wounds   Psychiatric: He has a normal mood and affect. His speech is delayed. He is slowed.    Nursing note and vitals reviewed.      Laboratory:  Immunosuppressants     None        CBC:     Recent Labs  Lab 07/18/18 0430   WBC 4.01   RBC 2.28*   HGB 7.9*   HCT 23.5*   PLT 34*   *   MCH 34.6*   MCHC 33.6     CMP:     Recent Labs  Lab 07/18/18 0430   *  136*   CALCIUM 9.0  9.0   ALBUMIN 2.3*   PROT 4.7*     139   K 4.0  4.0   CO2 16*  16*   *  115*   BUN 51*  51*   CREATININE 2.6*  2.6*   ALKPHOS 92   ALT 38   AST 58*   BILITOT 2.1*     Coagulation:     Recent Labs  Lab 07/18/18 0430   INR 1.6*     Labs within the past 24 hours have been reviewed.    Diagnostic Results:  pertinent imaging reviewed

## 2018-07-18 NOTE — PT/OT/SLP PROGRESS
Physical Therapy      Patient Name:  Alon Adamson   MRN:  [id number]    Patient not seen today secondary to  (hold this PM 2* decreased temperature). Will follow-up at next scheduled session as able.    Lucille Craig, PT, DPT   7/18/2018  [phone]

## 2018-07-18 NOTE — ASSESSMENT & PLAN NOTE
- Placed status 7 on 6/26 for prescription drug coverage change. Re-activated 7/3, MELD 22. Back on hold d/t frailty and decompensation at the end of last week prompting ICU transfer. Now off hold 7/17.  Plan to submit MELD exception points. Will remeld today at 24.   - dc propanolol    MELD-Na score: 24 at 7/18/2018  4:30 AM  MELD score: 24 at 7/18/2018  4:30 AM  Calculated from:  Serum Creatinine: 2.6 mg/dL at 7/18/2018  4:30 AM  Serum Sodium: 139 mmol/L (Rounded to 137) at 7/18/2018  4:30 AM  Total Bilirubin: 2.1 mg/dL at 7/18/2018  4:30 AM  INR(ratio): 1.6 at 7/18/2018  4:30 AM  Age: 63 years

## 2018-07-19 ENCOUNTER — TELEPHONE (OUTPATIENT)
Dept: TRANSPLANT | Facility: CLINIC | Age: 63
End: 2018-07-19

## 2018-07-19 PROBLEM — T68.XXXA HYPOTHERMIA: Status: ACTIVE | Noted: 2018-07-19

## 2018-07-19 LAB
ALBUMIN SERPL BCP-MCNC: 2.5 G/DL
ALP SERPL-CCNC: 107 U/L
ALT SERPL W/O P-5'-P-CCNC: 39 U/L
ANION GAP SERPL CALC-SCNC: 7 MMOL/L
ANION GAP SERPL CALC-SCNC: 9 MMOL/L
ANION GAP SERPL CALC-SCNC: 9 MMOL/L
ANISOCYTOSIS BLD QL SMEAR: SLIGHT
AST SERPL-CCNC: 62 U/L
BASOPHILS # BLD AUTO: 0.03 K/UL
BASOPHILS NFR BLD: 0.9 %
BILIRUB SERPL-MCNC: 2.1 MG/DL
BUN SERPL-MCNC: 58 MG/DL
BUN SERPL-MCNC: 59 MG/DL
BUN SERPL-MCNC: 61 MG/DL
BURR CELLS BLD QL SMEAR: ABNORMAL
CALCIUM SERPL-MCNC: 9.4 MG/DL
CALCIUM SERPL-MCNC: 9.6 MG/DL
CALCIUM SERPL-MCNC: 9.8 MG/DL
CHLORIDE SERPL-SCNC: 114 MMOL/L
CHLORIDE SERPL-SCNC: 117 MMOL/L
CHLORIDE SERPL-SCNC: 118 MMOL/L
CO2 SERPL-SCNC: 17 MMOL/L
CO2 SERPL-SCNC: 18 MMOL/L
CO2 SERPL-SCNC: 18 MMOL/L
CREAT SERPL-MCNC: 2.9 MG/DL
CREAT SERPL-MCNC: 2.9 MG/DL
CREAT SERPL-MCNC: 3.1 MG/DL
DACRYOCYTES BLD QL SMEAR: ABNORMAL
DIFFERENTIAL METHOD: ABNORMAL
EOSINOPHIL # BLD AUTO: 0.4 K/UL
EOSINOPHIL NFR BLD: 10.9 %
ERYTHROCYTE [DISTWIDTH] IN BLOOD BY AUTOMATED COUNT: 20.5 %
EST. GFR  (AFRICAN AMERICAN): 23.6 ML/MIN/1.73 M^2
EST. GFR  (AFRICAN AMERICAN): 25.6 ML/MIN/1.73 M^2
EST. GFR  (AFRICAN AMERICAN): 25.6 ML/MIN/1.73 M^2
EST. GFR  (NON AFRICAN AMERICAN): 20.5 ML/MIN/1.73 M^2
EST. GFR  (NON AFRICAN AMERICAN): 22.2 ML/MIN/1.73 M^2
EST. GFR  (NON AFRICAN AMERICAN): 22.2 ML/MIN/1.73 M^2
GLUCOSE SERPL-MCNC: 126 MG/DL
GLUCOSE SERPL-MCNC: 141 MG/DL
GLUCOSE SERPL-MCNC: 152 MG/DL
HCT VFR BLD AUTO: 25.6 %
HGB BLD-MCNC: 8.2 G/DL
HYPOCHROMIA BLD QL SMEAR: ABNORMAL
IMM GRANULOCYTES # BLD AUTO: 0.02 K/UL
IMM GRANULOCYTES NFR BLD AUTO: 0.6 %
INR PPP: 1.6
LYMPHOCYTES # BLD AUTO: 0.9 K/UL
LYMPHOCYTES NFR BLD: 25.1 %
MAGNESIUM SERPL-MCNC: 3.1 MG/DL
MCH RBC QN AUTO: 33.2 PG
MCHC RBC AUTO-ENTMCNC: 32 G/DL
MCV RBC AUTO: 104 FL
MONOCYTES # BLD AUTO: 0.7 K/UL
MONOCYTES NFR BLD: 18.9 %
NEUTROPHILS # BLD AUTO: 1.5 K/UL
NEUTROPHILS NFR BLD: 43.6 %
NRBC BLD-RTO: 0 /100 WBC
OVALOCYTES BLD QL SMEAR: ABNORMAL
PAPPENHEIMER BOD BLD QL SMEAR: PRESENT
PHOSPHATE SERPL-MCNC: 4.9 MG/DL
PLATELET # BLD AUTO: 30 K/UL
PLATELET BLD QL SMEAR: ABNORMAL
PMV BLD AUTO: 13.6 FL
POIKILOCYTOSIS BLD QL SMEAR: SLIGHT
POTASSIUM SERPL-SCNC: 4.1 MMOL/L
POTASSIUM SERPL-SCNC: 4.3 MMOL/L
POTASSIUM SERPL-SCNC: 4.3 MMOL/L
PROT SERPL-MCNC: 5 G/DL
PROTHROMBIN TIME: 15.7 SEC
RBC # BLD AUTO: 2.47 M/UL
SCHISTOCYTES BLD QL SMEAR: ABNORMAL
SCHISTOCYTES BLD QL SMEAR: PRESENT
SODIUM SERPL-SCNC: 140 MMOL/L
SODIUM SERPL-SCNC: 143 MMOL/L
SODIUM SERPL-SCNC: 144 MMOL/L
WBC # BLD AUTO: 3.5 K/UL

## 2018-07-19 PROCEDURE — 99233 SBSQ HOSP IP/OBS HIGH 50: CPT | Mod: NTX,,, | Performed by: NURSE PRACTITIONER

## 2018-07-19 PROCEDURE — 25000003 PHARM REV CODE 250: Mod: NTX | Performed by: PHYSICIAN ASSISTANT

## 2018-07-19 PROCEDURE — 97530 THERAPEUTIC ACTIVITIES: CPT | Mod: NTX

## 2018-07-19 PROCEDURE — 25000003 PHARM REV CODE 250: Mod: NTX | Performed by: NURSE PRACTITIONER

## 2018-07-19 PROCEDURE — 25000003 PHARM REV CODE 250: Mod: NTX | Performed by: INTERNAL MEDICINE

## 2018-07-19 PROCEDURE — 36415 COLL VENOUS BLD VENIPUNCTURE: CPT | Mod: NTX

## 2018-07-19 PROCEDURE — 80053 COMPREHEN METABOLIC PANEL: CPT | Mod: NTX

## 2018-07-19 PROCEDURE — 63600175 PHARM REV CODE 636 W HCPCS: Mod: NTX | Performed by: PHYSICIAN ASSISTANT

## 2018-07-19 PROCEDURE — 25000003 PHARM REV CODE 250: Mod: NTX | Performed by: STUDENT IN AN ORGANIZED HEALTH CARE EDUCATION/TRAINING PROGRAM

## 2018-07-19 PROCEDURE — 63600175 PHARM REV CODE 636 W HCPCS: Mod: NTX | Performed by: NURSE PRACTITIONER

## 2018-07-19 PROCEDURE — 83735 ASSAY OF MAGNESIUM: CPT | Mod: NTX

## 2018-07-19 PROCEDURE — 20600001 HC STEP DOWN PRIVATE ROOM: Mod: NTX

## 2018-07-19 PROCEDURE — 84100 ASSAY OF PHOSPHORUS: CPT | Mod: NTX

## 2018-07-19 PROCEDURE — 85610 PROTHROMBIN TIME: CPT | Mod: NTX

## 2018-07-19 PROCEDURE — 80048 BASIC METABOLIC PNL TOTAL CA: CPT | Mod: 91,NTX

## 2018-07-19 PROCEDURE — 85025 COMPLETE CBC W/AUTO DIFF WBC: CPT | Mod: NTX

## 2018-07-19 PROCEDURE — 99223 1ST HOSP IP/OBS HIGH 75: CPT | Mod: GC,NTX,, | Performed by: INTERNAL MEDICINE

## 2018-07-19 RX ORDER — ONDANSETRON 2 MG/ML
4 INJECTION INTRAMUSCULAR; INTRAVENOUS ONCE
Status: COMPLETED | OUTPATIENT
Start: 2018-07-19 | End: 2018-07-19

## 2018-07-19 RX ADMIN — LEVETIRACETAM 500 MG: 500 TABLET, FILM COATED ORAL at 09:07

## 2018-07-19 RX ADMIN — ASPIRIN 81 MG: 81 TABLET, COATED ORAL at 09:07

## 2018-07-19 RX ADMIN — SERTRALINE HYDROCHLORIDE 50 MG: 50 TABLET ORAL at 09:07

## 2018-07-19 RX ADMIN — ERGOCALCIFEROL 50000 UNITS: 1.25 CAPSULE ORAL at 09:07

## 2018-07-19 RX ADMIN — SODIUM BICARBONATE 650 MG TABLET 1950 MG: at 09:07

## 2018-07-19 RX ADMIN — LEVETIRACETAM 500 MG: 500 TABLET, FILM COATED ORAL at 08:07

## 2018-07-19 RX ADMIN — HEPARIN SODIUM 5000 UNITS: 5000 INJECTION, SOLUTION INTRAVENOUS; SUBCUTANEOUS at 06:07

## 2018-07-19 RX ADMIN — Medication 220 MG: at 09:07

## 2018-07-19 RX ADMIN — CEFEPIME 1 G: 1 INJECTION, POWDER, FOR SOLUTION INTRAMUSCULAR; INTRAVENOUS at 05:07

## 2018-07-19 RX ADMIN — LACTULOSE 45 G: 20 SOLUTION ORAL at 03:07

## 2018-07-19 RX ADMIN — LACTULOSE 45 G: 20 SOLUTION ORAL at 09:07

## 2018-07-19 RX ADMIN — PANTOPRAZOLE SODIUM 40 MG: 40 TABLET, DELAYED RELEASE ORAL at 08:07

## 2018-07-19 RX ADMIN — ATORVASTATIN CALCIUM 40 MG: 20 TABLET, FILM COATED ORAL at 09:07

## 2018-07-19 RX ADMIN — SODIUM BICARBONATE 650 MG TABLET 1950 MG: at 03:07

## 2018-07-19 RX ADMIN — PANTOPRAZOLE SODIUM 40 MG: 40 TABLET, DELAYED RELEASE ORAL at 09:07

## 2018-07-19 RX ADMIN — ONDANSETRON 4 MG: 4 TABLET, FILM COATED ORAL at 01:07

## 2018-07-19 RX ADMIN — HEPARIN SODIUM 5000 UNITS: 5000 INJECTION, SOLUTION INTRAVENOUS; SUBCUTANEOUS at 08:07

## 2018-07-19 RX ADMIN — Medication 220 MG: at 08:07

## 2018-07-19 RX ADMIN — RIFAXIMIN 550 MG: 550 TABLET ORAL at 08:07

## 2018-07-19 RX ADMIN — OXYCODONE HYDROCHLORIDE AND ACETAMINOPHEN 1000 MG: 500 TABLET ORAL at 09:07

## 2018-07-19 RX ADMIN — LIDOCAINE 2 PATCH: 50 PATCH TOPICAL at 12:07

## 2018-07-19 RX ADMIN — ONDANSETRON 4 MG: 2 INJECTION, SOLUTION INTRAMUSCULAR; INTRAVENOUS at 09:07

## 2018-07-19 RX ADMIN — RIFAXIMIN 550 MG: 550 TABLET ORAL at 09:07

## 2018-07-19 RX ADMIN — LACTULOSE 45 G: 20 SOLUTION ORAL at 08:07

## 2018-07-19 RX ADMIN — FLUCONAZOLE 200 MG: 200 TABLET ORAL at 09:07

## 2018-07-19 RX ADMIN — SODIUM BICARBONATE 650 MG TABLET 1950 MG: at 08:07

## 2018-07-19 RX ADMIN — HEPARIN SODIUM 5000 UNITS: 5000 INJECTION, SOLUTION INTRAVENOUS; SUBCUTANEOUS at 03:07

## 2018-07-19 NOTE — PROGRESS NOTES
[facility]  Nephrology  Progress Note    Patient Name: Alon Adamson  MRN: [id number]  Admission Date: 6/22/2018  Hospital Length of Stay: 27 days  Attending Provider: Elan Guerra MD   Primary Care Physician: Mckinley Vides MD  Principal Problem:Acute kidney injury superimposed on chronic kidney disease    Subjective:     HPI: Mr. Adamson is 63 yr old male with decompensated alcoholic cirrhosis, CKD III and CAD admitted here on 06/22/18 admitted for bilateral LE hypervolemia and VICKY on CKD III with cr of 2.3 baseline around 1.5. Patient has multiple hospitalization in the past secondary to decompensated liver failure. Patient is currently on transplant team. During current admission patient was getting lasix and albumin intermittently for VICKY however renal function was not getting better. Patient hospital course complicated by hepatic encephalopathy with some improvement of mentation with lactulose. Also treated for cellulitis with 7 days of vancomycin.Nephrology is consulted for worsening/not improving VICKY despite lasix/albumin.     Per chart review patient has no hypotensive episodes, BP mostly above 100's. Has not received nephrotoxins such as NSAIDs/contrast media. No sever sepsis/septic shock or acute blood loss during current admit. UA with 2+ leukocytes and Hyaline casts, no wbc/rbc cast or proteinuria. Urine Na+ < 20.    Patient was transferred to ICU on 7/13/2018 after being more lethargic and hypoactive.  After two days was stepped down back to [facility].       Interval History: NAEON  Patient reports doing well, I sopked with him and his mother and answered their questions about kidney function. Input ~ 945, UOP increased to 550, leads to positive net of 395. Lasix was discontinued on 7/17. Cr level slightly increased from 2.8 on 7/18 to 2.9 today. HD is hold for now.    Review of patient's allergies indicates:   Allergen Reactions    Nsaids (non-steroidal anti-inflammatory  drug)      D/t to liver disease.    Tylenol [acetaminophen]      D/t liver disease.    Penicillins Other (See Comments)     Tolerated pip-tazo in 8/2016 without issue  Since childhood was told not to take it     Current Facility-Administered Medications   Medication Frequency    0.9%  NaCl infusion (for blood administration) Q24H PRN    0.9%  NaCl infusion (for blood administration) Q24H PRN    acetaminophen tablet 650 mg Q8H PRN    ascorbic acid (vitamin C) tablet 1,000 mg Daily    aspirin EC tablet 81 mg Daily    atorvastatin tablet 40 mg Daily    ceFEPIme injection 1 g Q12H    diphenhydrAMINE-zinc acetate 1-0.1% cream TID PRN    docusate sodium capsule 100 mg Daily    ergocalciferol capsule 50,000 Units Q7 Days    fluconazole tablet 200 mg Daily    heparin (porcine) injection 5,000 Units Q8H    lactulose 10 gram/15 mL solution (enema) 200 g Q6H PRN    lactulose 20 gram/30 mL solution Soln 45 g TID    levETIRAcetam tablet 500 mg BID    lidocaine 5 % patch 2 patch Q24H    metoclopramide HCl injection 10 mg Q6H PRN    omega-3 acid ethyl esters capsule 2 g Daily with breakfast    ondansetron tablet 4 mg Q8H PRN    pantoprazole EC tablet 40 mg BID    rifAXIMin tablet 550 mg BID    sertraline tablet 50 mg Daily    sodium bicarbonate tablet 1,950 mg TID    sodium chloride 0.9% flush 3 mL PRN    traMADol tablet 50 mg Q6H PRN    zinc sulfate capsule 220 mg BID       Objective:     Vital Signs (Most Recent):  Temp: 97.5 °F (36.4 °C) (07/19/18 1131)  Pulse: 72 (07/19/18 1131)  Resp: 16 (07/19/18 1131)  BP: (!) 123/57 (07/19/18 1131)  SpO2: 99 % (07/19/18 1131)  O2 Device (Oxygen Therapy): room air (07/19/18 1131) Vital Signs (24h Range):  Temp:  [93.7 °F (34.3 °C)-97.9 °F (36.6 °C)] 97.5 °F (36.4 °C)  Pulse:  [72-89] 72  Resp:  [16-18] 16  SpO2:  [94 %-99 %] 99 %  BP: (103-123)/(50-58) 123/57     Weight: 126.3 kg (278 lb 7.1 oz) (07/16/18 0600)  Body mass index is 35.75 kg/m².  Body surface area  is 2.57 meters squared.    I/O last 3 completed shifts:  In: 945 [P.O.:720; Blood:225]  Out: 550 [Urine:550]    Physical Exam   Constitutional: He is oriented to person, place, and time. He appears well-developed and well-nourished. No distress.   HENT:   Head: Normocephalic and atraumatic.   Eyes: EOM are normal. Pupils are equal, round, and reactive to light.   Neck: Normal range of motion. Neck supple. No JVD present.   Cardiovascular: Normal rate, regular rhythm, normal heart sounds and intact distal pulses.  Exam reveals no friction rub.    No murmur heard.  Pulmonary/Chest: Effort normal and breath sounds normal. No respiratory distress. He has no wheezes. He has no rales. He exhibits no tenderness.   Abdominal: Soft. Bowel sounds are normal. He exhibits no distension. There is no tenderness.   Neurological: He is alert and oriented to person, place, and time.   Skin: Skin is warm and dry. Capillary refill takes less than 2 seconds. He is not diaphoretic. There is erythema.   Scaling     Psychiatric: He has a normal mood and affect. His behavior is normal. Judgment and thought content normal.       Significant Labs:  CBC:   Recent Labs  Lab 07/19/18  0514   WBC 3.50*   RBC 2.47*   HGB 8.2*   HCT 25.6*   PLT 30*   *   MCH 33.2*   MCHC 32.0     CMP:   Recent Labs  Lab 07/19/18  0514   *   CALCIUM 9.4   ALBUMIN 2.5*   PROT 5.0*      K 4.1   CO2 18*   *   BUN 58*   CREATININE 2.9*   ALKPHOS 107   ALT 39   AST 62*   BILITOT 2.1*          Assessment/Plan:     * Acute kidney injury superimposed on chronic kidney disease    62 yo male with acute decompensated liver failure secondary to alcohol cirrhosis now with sCr 1.7, showing improvement.  Nephrology consulted for VICKY.  Etiology most likely pre-renal.  Also considered HRS but less likely given optimal BP and renal function labs.     -sCr at baseline 1.4-1.5, sCr stable at 1.7 this morning, UOP increasing and about 30ccs per hour this  morning  -no need for RRT, continue to monitor serial RFPs and strict Is & Os  - Avoid nephrotoxic medications  - Maintain MAP >65  - Hb > 7gm/dL  - ABGs, U/A, Labs in AM                   Thank you for your consult. I will follow-up with patient. Please contact us if you have any additional questions.    Chichi Urbina MD  Nephrology  [facility]

## 2018-07-19 NOTE — ASSESSMENT & PLAN NOTE
63M PMHC alcoholic cirrhosis, initially admitted for decompensated cirrhosis, seen by ID earlier during hospital admission for cellulitis and CONS in 2/4 blood cx bottles, now s/p 7 days of vancomycin. He has been persistently hypothermic to 93-94* requiring warming blanket. ID re-consulted today for evaluation of hypothermia.    Recommendations:  - follow blood cx from 7/17, so far NGTD  - aside from hypothermia, pt clinically appears well  - WBC is low, but near baseline  - would also recommend evaluation for non-infectious etiology with TSH + free T4, and 8AM cortisol level  - OK to continue vanc/cefepime for now, will re-evaluate in AM

## 2018-07-19 NOTE — ASSESSMENT & PLAN NOTE
- Placed status 7 on 6/26 for prescription drug coverage change. Re-activated 7/3, MELD 22. Back on hold d/t frailty and decompensation at the end of last week prompting ICU transfer. Now off hold 7/17.  Plan to submit MELD exception points. Will remeld today at 25.   - dc propanolol    MELD-Na score: 25 at 7/19/2018  2:03 PM  MELD score: 25 at 7/19/2018  2:03 PM  Calculated from:  Serum Creatinine: 2.9 mg/dL at 7/19/2018  2:03 PM  Serum Sodium: 140 mmol/L (Rounded to 137) at 7/19/2018  2:03 PM  Total Bilirubin: 2.1 mg/dL at 7/19/2018  5:14 AM  INR(ratio): 1.6 at 7/19/2018  5:14 AM  Age: 63 years

## 2018-07-19 NOTE — PT/OT/SLP PROGRESS
"Physical Therapy Treatment    Patient Name:  Alon Adamson   MRN:  [id number]    Recommendations:     Discharge Recommendations:  nursing facility, skilled   Discharge Equipment Recommendations: none   Barriers to discharge: Inaccessible home ([facility])    Assessment:     Alon Adamson is a 63 y.o. male admitted with a medical diagnosis of Acute kidney injury superimposed on chronic kidney disease.  He presents with the following impairments/functional limitations:  weakness, gait instability, impaired endurance, impaired balance, impaired self care skills, impaired functional mobilty, decreased upper extremity function, decreased lower extremity function, decreased safety awareness, impaired cognition, decreased coordination, edema, impaired skin. Pt requiring increased assist to complete bed mobility, but was able to perform transfers with CGA and RW. Ambulation limited to stand pivot bed>chair 2* impaired endurance, generalized weakness, and abdominal discomfort with standing. Pt would continue to benefit from skilled acute PT in order to address current deficits and progress functional mobility.     Rehab Prognosis:  good; patient would benefit from acute skilled PT services to address these deficits and reach maximum level of function.      Recent Surgery: * No surgery found *      Plan:     During this hospitalization, patient to be seen 4 x/week to address the above listed problems via gait training, therapeutic activities, therapeutic exercises, neuromuscular re-education  · Plan of Care Expires:  08/14/18   Plan of Care Reviewed with: patient, mother    Subjective     Communicated with RN prior to session.  Patient found supine upon PT entry to room, agreeable to treatment.      Chief Complaint: none noted   Patient comments/goals: "I may need to be cleaned up."  Pain/Comfort:  · Pain Rating 1:  (reported lower abdominal pain upon standing but did not rate; improved with sitting)    Patients " cultural, spiritual, Congregation conflicts given the current situation: none noted     Objective:     Patient found with: griffin catheter     General Precautions: Standard, fall   Orthopedic Precautions:N/A   Braces: N/A     Functional Mobility:  · Bed Mobility:     · Rolling Left:  minimum assistance and with cues and side rail  · Rolling Right: minimum assistance and with cues and side rail  · Supine to Sit: moderate assistance  · Transfers:     · Sit to Stand:  contact guard assistance with rolling walker and from elevated bed height  · Bed to Chair: contact guard assistance with  rolling walker  using  Stand Pivot  · Gait: 2 ft. stand pivot bed>chair with RW and CGA  · Bradykinesia, decreased step length, decreased toe-floor clearance   · Cues for RW management and safe mobility techniques      AM-PAC 6 CLICK MOBILITY  Turning over in bed (including adjusting bedclothes, sheets and blankets)?: 3  Sitting down on and standing up from a chair with arms (e.g., wheelchair, bedside commode, etc.): 3  Moving from lying on back to sitting on the side of the bed?: 2  Moving to and from a bed to a chair (including a wheelchair)?: 3  Need to walk in hospital room?: 3  Climbing 3-5 steps with a railing?: 1  Basic Mobility Total Score: 15       Therapeutic Activities and Exercises:   Increased time required to complete tasks 2* delayed processing and slowed responses.   Pt found to be soiled. Performed rolling in B directions with Giana and side rail to complete susanna-care.     Patient left sitting in w/c with all lines intact, call button in reach and pt's mother and RN present..    GOALS:    Physical Therapy Goals        Problem: Physical Therapy Goal    Goal Priority Disciplines Outcome Goal Variances Interventions   Physical Therapy Goal     PT/OT, PT Ongoing (interventions implemented as appropriate)     Description:  Goals to be met by: 7/26/18     Patient will increase functional independence with mobility by  performin. Supine to sit with Stand-by Assistance.  2. Sit to stand with Supervision.  3. Gait x 200 ft with Supervision with RW.  4. Ascend/descend 5 stair with right Handrails with SBA.  5. Lower extremity exercise program x 20 reps, with supervision, in order to increase LE strength and (I) with functional mobility.                               Time Tracking:     PT Received On: 18  PT Start Time: 929     PT Stop Time: 956  PT Total Time (min): 27 min     Billable Minutes: Therapeutic Activity 27    Treatment Type: Treatment  PT/PTA: PT     PTA Visit Number: 0     Lucille Craig, PT, DPT   2018  [phone]

## 2018-07-19 NOTE — SUBJECTIVE & OBJECTIVE
Interval History: NATASHA  Patient reports doing well, I sopked with him and his mother and answered their questions about kidney function. Input ~ 945, UOP increased to 550, leads to positive net of 395. Lasix was discontinued on 7/17. Cr level slightly increased from 2.8 on 7/18 to 2.9 today. HD is hold for now.    Review of patient's allergies indicates:   Allergen Reactions    Nsaids (non-steroidal anti-inflammatory drug)      D/t to liver disease.    Tylenol [acetaminophen]      D/t liver disease.    Penicillins Other (See Comments)     Tolerated pip-tazo in 8/2016 without issue  Since childhood was told not to take it     Current Facility-Administered Medications   Medication Frequency    0.9%  NaCl infusion (for blood administration) Q24H PRN    0.9%  NaCl infusion (for blood administration) Q24H PRN    acetaminophen tablet 650 mg Q8H PRN    ascorbic acid (vitamin C) tablet 1,000 mg Daily    aspirin EC tablet 81 mg Daily    atorvastatin tablet 40 mg Daily    ceFEPIme injection 1 g Q12H    diphenhydrAMINE-zinc acetate 1-0.1% cream TID PRN    docusate sodium capsule 100 mg Daily    ergocalciferol capsule 50,000 Units Q7 Days    fluconazole tablet 200 mg Daily    heparin (porcine) injection 5,000 Units Q8H    lactulose 10 gram/15 mL solution (enema) 200 g Q6H PRN    lactulose 20 gram/30 mL solution Soln 45 g TID    levETIRAcetam tablet 500 mg BID    lidocaine 5 % patch 2 patch Q24H    metoclopramide HCl injection 10 mg Q6H PRN    omega-3 acid ethyl esters capsule 2 g Daily with breakfast    ondansetron tablet 4 mg Q8H PRN    pantoprazole EC tablet 40 mg BID    rifAXIMin tablet 550 mg BID    sertraline tablet 50 mg Daily    sodium bicarbonate tablet 1,950 mg TID    sodium chloride 0.9% flush 3 mL PRN    traMADol tablet 50 mg Q6H PRN    zinc sulfate capsule 220 mg BID       Objective:     Vital Signs (Most Recent):  Temp: 97.5 °F (36.4 °C) (07/19/18 1131)  Pulse: 72 (07/19/18  1131)  Resp: 16 (07/19/18 1131)  BP: (!) 123/57 (07/19/18 1131)  SpO2: 99 % (07/19/18 1131)  O2 Device (Oxygen Therapy): room air (07/19/18 1131) Vital Signs (24h Range):  Temp:  [93.7 °F (34.3 °C)-97.9 °F (36.6 °C)] 97.5 °F (36.4 °C)  Pulse:  [72-89] 72  Resp:  [16-18] 16  SpO2:  [94 %-99 %] 99 %  BP: (103-123)/(50-58) 123/57     Weight: 126.3 kg (278 lb 7.1 oz) (07/16/18 0600)  Body mass index is 35.75 kg/m².  Body surface area is 2.57 meters squared.    I/O last 3 completed shifts:  In: 945 [P.O.:720; Blood:225]  Out: 550 [Urine:550]    Physical Exam   Constitutional: He is oriented to person, place, and time. He appears well-developed and well-nourished. No distress.   HENT:   Head: Normocephalic and atraumatic.   Eyes: EOM are normal. Pupils are equal, round, and reactive to light.   Neck: Normal range of motion. Neck supple. No JVD present.   Cardiovascular: Normal rate, regular rhythm, normal heart sounds and intact distal pulses.  Exam reveals no friction rub.    No murmur heard.  Pulmonary/Chest: Effort normal and breath sounds normal. No respiratory distress. He has no wheezes. He has no rales. He exhibits no tenderness.   Abdominal: Soft. Bowel sounds are normal. He exhibits no distension. There is no tenderness.   Neurological: He is alert and oriented to person, place, and time.   Skin: Skin is warm and dry. Capillary refill takes less than 2 seconds. He is not diaphoretic. There is erythema.   Scaling     Psychiatric: He has a normal mood and affect. His behavior is normal. Judgment and thought content normal.       Significant Labs:  CBC:   Recent Labs  Lab 07/19/18  0514   WBC 3.50*   RBC 2.47*   HGB 8.2*   HCT 25.6*   PLT 30*   *   MCH 33.2*   MCHC 32.0     CMP:   Recent Labs  Lab 07/19/18  0514   *   CALCIUM 9.4   ALBUMIN 2.5*   PROT 5.0*      K 4.1   CO2 18*   *   BUN 58*   CREATININE 2.9*   ALKPHOS 107   ALT 39   AST 62*   BILITOT 2.1*

## 2018-07-19 NOTE — CONSULTS
Consult received. Full note to follow. Please call for questions.    Thanks,    Jackelyn Hampton DO  Infectious Disease Fellow  P: [phone]

## 2018-07-19 NOTE — ASSESSMENT & PLAN NOTE
- cholestyramine has previously been ineffective, given depressed mood.   - Zoloft for mood and pruritus.

## 2018-07-19 NOTE — PROGRESS NOTES
[facility]  Liver Transplant  Progress Note    Patient Name: Alon Adamson  MRN: [id number]  Admission Date: 6/22/2018  Hospital Length of Stay: 27 days  Code Status: Full Code  Primary Care Provider: Mckinley Vides MD  Post-Operative Day:     ORGAN:     Disease Etiology: Alcoholic Cirrhosis  Donor Type:     CDC High Risk:     Donor CMV Status:   Donor CMV Status:   Donor HBcAB:     Donor HCV Status:     Donor HBV TAMAR: Organ record is missing.  Donor HCV TAMAR: Organ record is missing.  Whole or Partial:   Biliary Anastomosis:   Arterial Anatomy:   Subjective:     History of Present Illness:  Mr. Adamson is a 64 yo M with PMH ESLD secondary to ETOH cirrhosis, CKD3, CAD.  Complications of liver disease include hyperbilirubinemia, hypoalbuminemia, severe malnutrition,   hepatic encephalopathy, thrombocytopenia, splenomegaly, ascites, hypervolemia, coagulopathy, portal HTN.  Pt recently admitted for severe hepatic encephalopathy requiring intubation but has been stable from mentation standpoint post resolution of acute enceophalopathy. Mr. Adamson was seen in clinic 6/22 and was noted to have significant hypervolemia, weeping from BLE, and VICKY on CKD3 on routine labs.  His MELD increased from 22 to 26 per labs- coordinator notified and MELD updated.  Cr elevated to 2.3 from baseline of 1.4.  Also with worsening ascites.  Pt reported increased pruritis, generalized fatigue and weakness.  He presented in wheelchair.  ROS otherwise negative.      Hospital Course:  Patient is listed for liver transplant, placed on internal hold 6/25 for HE then status 7 on 6/26 due to change in prescription coverage. He was re-activated on the list 7/3, MELD 22. Pt started on IV diuretics 6/22 and continued 6/23 and 6/24.  BLE edema and ascites signif improved with diuresis and kidney function also improved. During admission, patient had worsening encephalopathy and asterixis prompting infectious work up, all blood and  urine cultures obtained during admission with NGTD. Intermittently, encephalopathy severe enough to warrant lactulose enemas. CT head obtained and negative. Paracentesis 6/25 negative for peritonitis per cell count and U/A negative. Pt also with concern for BLE cellulitis- upper legs bright red with lower BLE still with dark appearance of venous stasis. Placed on vancomycin with improvement in BLE redness 6/26. Vanc continued for 7 days (ended 7/1). His HE waxed and waned for several days requiring lactulose enemas. Pt eating minimally during period of encephalopathy requiring gentle hydration with IVF 6/26. Of note, taco placement attempted 6/26 but unable secondary to agitation upon insertion prompting self resolving nose bleed. Micro lab called 6/26 PM with blood cx + for G+C in blood- pt continued on vanc which was started 6/25. ID consulted, suspect + blood cultures contaminant. EEG obtained 6/26 as pt with h/o serizures and negative. Pt continued on home Keppra. VICKY on admission initially improved with diuresis, but Cr sakina intermittently during admission likely related to aggressive diuresis. Of note, with chest pain overnight 6/26-6/27 that resolved without intervention and pt reported as mild.  EKG NSR with PVC, troponin 0.1 in setting of hypervolemia + VICKY.  Normal dobutamine stress 1/2018.  Cardiology consulted and felt not ACS, no need for further workup other than continue to treat current risk factors for CAD with ASA (pt already on ASA and statin as outpt) + BB for portal hypertension which was started. Repeat paracentesis 7/10, 4.8L off, no SBP. Hypernatremia noted 7/9, nephrology consulted to aid in management. Hypernatremia attributed to dehydration from frequent BM's, diuretics were held, d/c'd creon due to excessive diarrhea, and patient was treated with increasing po free water and IVF. Echo 7/9 with normal EF, no wall abnormality, mild tricuspid/mitral regurg. Was sent to ICU 7/13/18 for  lethargy/HE with hypotension and hypothermia. Received lactulose enemas and albumin bolus with improvement in mental status and BPS. Started on BS abx (cefepime/vanc/fluc), although Bcx and Ucx both NGTD.     Interval History: No acute events overnight. Patient more alert this AM. Oriented x 3. MELD 25 today. Cultures NGTD, but will continue on cefepime/fluc for now. ID consulted yesterday for hypothermia and awaiting recs. H/H low on 7/18 and one unit of PRBCs transfused. Of note, patient with hypothermia 7/17 and 7/18. Bear hugger applied along with heat packs. Improvement in temp noted. Cr level with slight increase today.  Encourage PO intake for nutrition. Monitor.     Scheduled Meds:   ascorbic acid (vitamin C)  1,000 mg Oral Daily    aspirin  81 mg Oral Daily    atorvastatin  40 mg Oral Daily    ceFEPime (MAXIPIME) IVPB  1 g Intravenous Q12H    docusate sodium  100 mg Oral Daily    ergocalciferol  50,000 Units Oral Q7 Days    fluconazole  200 mg Oral Daily    heparin (porcine)  5,000 Units Subcutaneous Q8H    lactulose  45 g Oral TID    levETIRAcetam  500 mg Oral BID    lidocaine  2 patch Transdermal Q24H    omega-3 acid ethyl esters  2 g Oral Daily with breakfast    pantoprazole  40 mg Oral BID    rifAXIMin  550 mg Oral BID    sertraline  50 mg Oral Daily    sodium bicarbonate  1,950 mg Oral TID    zinc sulfate  220 mg Oral BID     Continuous Infusions:  PRN Meds:sodium chloride, sodium chloride, acetaminophen, diphenhydrAMINE-zinc acetate 1-0.1%, lactulose, metoclopramide HCl, ondansetron, sodium chloride 0.9%, traMADol    Review of Systems   Constitutional: Positive for appetite change and fatigue. Negative for activity change, chills and fever.   HENT: Negative for mouth sores, sore throat and trouble swallowing.    Eyes: Negative for visual disturbance.   Respiratory: Positive for shortness of breath (upon activity). Negative for wheezing.    Cardiovascular: Positive for leg swelling.  Negative for chest pain.   Gastrointestinal: Positive for diarrhea (lactulose). Negative for abdominal distention, abdominal pain, nausea and vomiting.   Endocrine: Negative for polydipsia and polyuria.   Genitourinary: Negative for decreased urine volume and difficulty urinating.   Musculoskeletal: Positive for arthralgias (both shoulders have been hurting for last 2 weeks, right > left, improved with lidocaine patches).   Skin: Negative for rash.   Neurological: Positive for weakness. Negative for dizziness, syncope and headaches.   Hematological: Bruises/bleeds easily.   Psychiatric/Behavioral: Negative for agitation, confusion, hallucinations and sleep disturbance.     Objective:     Vital Signs (Most Recent):  Temp: 97.5 °F (36.4 °C) (07/19/18 1131)  Pulse: 72 (07/19/18 1131)  Resp: 16 (07/19/18 1131)  BP: (!) 123/57 (07/19/18 1131)  SpO2: 99 % (07/19/18 1131) Vital Signs (24h Range):  Temp:  [96.3 °F (35.7 °C)-97.9 °F (36.6 °C)] 97.5 °F (36.4 °C)  Pulse:  [72-89] 72  Resp:  [16-18] 16  SpO2:  [94 %-99 %] 99 %  BP: (103-123)/(50-58) 123/57     Weight: 126.3 kg (278 lb 7.1 oz)  Body mass index is 35.75 kg/m².    Intake/Output - Last 3 Shifts       07/17 0700 - 07/18 0659 07/18 0700 - 07/19 0659 07/19 0700 - 07/20 0659    P.O.  720 420    I.V. (mL/kg)  0 (0) 0 (0)    Blood  225     Other  0 0    Total Intake(mL/kg)  945 (7.5) 420 (3.3)    Urine (mL/kg/hr) 175 (0.1) 550 (0.2) 165 (0.2)    Emesis/NG output  0 (0) 850 (0.8)    Other  0 (0) 0 (0)    Stool  0 (0) 0 (0)    Blood  0 (0) 0 (0)    Total Output     Net -175 +395 -595           Urine Occurrence  0 x 0 x    Stool Occurrence 7 x 6 x 2 x    Emesis Occurrence  1 x 0 x          Physical Exam   Constitutional: He appears well-developed and well-nourished. He appears lethargic. He is cooperative.   HENT:   Head: Normocephalic and atraumatic.   Mouth/Throat: No oropharyngeal exudate.   Eyes: EOM are normal. Pupils are equal, round, and reactive to  light. Scleral icterus is present.   Neck: No JVD present.   Cardiovascular: Normal rate, regular rhythm and intact distal pulses.  Exam reveals no gallop and no friction rub.    Murmur heard.  Pulmonary/Chest: Effort normal. No stridor. No respiratory distress. He has decreased breath sounds in the right lower field and the left lower field. He has no wheezes. He has no rales.   Abdominal: Soft. Bowel sounds are normal. He exhibits no distension and no mass.   Musculoskeletal: He exhibits edema (+3 generalized edema ). He exhibits no tenderness.   Lymphadenopathy:     He has no cervical adenopathy.   Neurological: He appears lethargic.   + asterixis and baseline tremor    Skin: Skin is warm and dry. No rash noted. No erythema.   BLE with chronic venous stasis changes, crusty/flaky skin intact without wounds   Psychiatric: He has a normal mood and affect. His speech is delayed. He is slowed.   Nursing note and vitals reviewed.      Laboratory:  Immunosuppressants     None        CBC:   Recent Labs  Lab 07/19/18 0514   WBC 3.50*   RBC 2.47*   HGB 8.2*   HCT 25.6*   PLT 30*   *   MCH 33.2*   MCHC 32.0     CMP:   Recent Labs  Lab 07/19/18 0514 07/19/18  1403   * 152*   CALCIUM 9.4 9.6   ALBUMIN 2.5*  --    PROT 5.0*  --     140   K 4.1 4.3   CO2 18* 17*   * 114*   BUN 58* 59*   CREATININE 2.9* 2.9*   ALKPHOS 107  --    ALT 39  --    AST 62*  --    BILITOT 2.1*  --      Coagulation:   Recent Labs  Lab 07/19/18 0514   INR 1.6*     Labs within the past 24 hours have been reviewed.    Diagnostic Results:  None    Assessment/Plan:     * Acute kidney injury superimposed on chronic kidney disease    - VICKY initially improving with albumin infusion but with minimal intake 6/25 and 6/26.  - dc sodium bicarb due to hypernatremia  - nephrology consulted due to hypervolemia and multiple electrolyte abnormalities (acidosis, hypernatremia)  - hold diuretics as worse Cr level a this time.        "  Hypothermia    - with episodes of hypothermia on 7/17 and 7/18.   - BS antibxs on board for prophylaxis.   - blood cultures NGTD.   - ID consulted for further management.           Severe protein-calorie malnutrition    - Dietary consulted for calorie count.  - Pt eating better accord to family.  Does take boost supplement.  - "pre-hab" regimen and supplements ordered. Dc creon due to excessive diarrhea  - Patient drinking at least 2-3 boost supplements and beneprotein daily.   - If continues to have low intake, consider starting Megace.           Physical deconditioning    - PT/OT consulted.    - Encourage OOBTC for several hours each day, walking with walker with PT        Ascites due to alcoholic cirrhosis    - Para 6/25 negative for infection   - Para 7/10 negative for infection. Monitor.           Coagulopathy    - Secondary to decompensated liver disease.         Cholestatic pruritus    - cholestyramine has previously been ineffective, given depressed mood.   - Zoloft for mood and pruritus.        Hypoalbuminemia due to protein-calorie malnutrition    - see severe protein-calorie malnutrition         Pancytopenia    - related to decompensated liver disease         Decompensated hepatic cirrhosis    - Placed status 7 on 6/26 for prescription drug coverage change. Re-activated 7/3, MELD 22. Back on hold d/t frailty and decompensation at the end of last week prompting ICU transfer. Now off hold 7/17.  Plan to submit MELD exception points. Will remeld today at 25.   - dc propanolol    MELD-Na score: 25 at 7/19/2018  2:03 PM  MELD score: 25 at 7/19/2018  2:03 PM  Calculated from:  Serum Creatinine: 2.9 mg/dL at 7/19/2018  2:03 PM  Serum Sodium: 140 mmol/L (Rounded to 137) at 7/19/2018  2:03 PM  Total Bilirubin: 2.1 mg/dL at 7/19/2018  5:14 AM  INR(ratio): 1.6 at 7/19/2018  5:14 AM  Age: 63 years        Coronary artery disease involving native coronary artery of native heart without angina pectoris    - Continue " home ASA.   - 2D Echo 7/9 normal EF (55-60%), trivial tricuspid/mitral regurg.        Anemia of chronic disease    - H&H decreased on 7/18 and one unit of PRBCs given for replacement at that time.   - With good response to transfusion.   - monitor.          Thrombocytopenia    - Secondary to splenomegaly from portal HTN- should improve with txp.  - No s/s overt bleed.        Seizures    - Oral Keppra transitioned to IV as pt not able to swallow 6/26.  - Resumed oral Keppra since mentation improved.  - EEG 6/26 negative.        Bilateral edema of lower extremity    - Completed vanc x 7 days for BLE cellulitis  - 2D Echo 7/9 normal EF (55-60%), trivial tricuspid/mitral regurg.  - diuretics on hold, due to rise in CR, monitoring closely.   - Vanc now restarted given recent episodes of hypothermia.         Hepatic encephalopathy    - ESLD secondary to EtOH cirrhosis with severe complications (hyperbilirubinemia, hypoalbuminemia, severe malnutrition, hepatic encephalopathy, thrombocytopenia, splenomegaly, ascites, hypervolemia, coaguloopathy, portal HTN), seizure disorder on Keppra, CKD3, CAD   - has frequent episodes of lethargy/confusion/slowing when holding lactulose   - admitted to SICU 7/13/18 for worsening encephalopathy and hypothermia  - PO lactulose TID, PRN lactulose enemas, Rifaximin  - more alert today. Continue oral lactulose and PRN lactulose enemas for HE control.               VTE Risk Mitigation         Ordered     heparin (porcine) injection 5,000 Units  Every 8 hours      06/22/18 1502     IP VTE HIGH RISK PATIENT  Once      06/22/18 1502          The patients clinical status was discussed at multidisplinary rounds, involving transplant surgery, transplant medicine, pharmacy, nursing, nutrition, and social work    Discharge Planning:  Not a candidate for d/c at this time.     Jonathan Lopez, NP  Liver Transplant  [facility]

## 2018-07-19 NOTE — CONSULTS
[facility]  Infectious Disease  Consult Note    Patient Name: Alon Adamson  MRN: [id number]  Admission Date: 6/22/2018  Hospital Length of Stay: 27 days  Attending Physician: Elan Guerra MD  Primary Care Provider: Mckinley Vides MD     Isolation Status: No active isolations    Patient information was obtained from patient and ER records.      Consults  Assessment/Plan:     Hypothermia    63M PMHC alcoholic cirrhosis, initially admitted for decompensated cirrhosis, seen by ID earlier during hospital admission for cellulitis and CONS in 2/4 blood cx bottles, now s/p 7 days of vancomycin. He has been persistently hypothermic to 93-94* requiring warming blanket. ID re-consulted today for evaluation of hypothermia.    Recommendations:  - follow blood cx from 7/17, so far NGTD  - aside from hypothermia, pt clinically appears well  - WBC is low, but near baseline  - would also recommend evaluation for non-infectious etiology with TSH + free T4, and 8AM cortisol level  - OK to continue vanc/cefepime for now, will re-evaluate in AM            Thank you for your consult. I will follow-up with patient. Please contact us if you have any additional questions.      Jackelyn Hampton DO  Infectious Disease Fellow  P: [phone]      Subjective:     Principal Problem: Acute kidney injury superimposed on chronic kidney disease    HPI: 64 y/o M h/o ETOH cirrhosis (PSE, pHTN) listed for liver txp, CKD3, CAD recently admitted for worsening PSE  requiring intubation admitted from clinic with hypervolemia, VICKY and b/l LE erythema. Here he has been afebrile, with diagnostic paracentesis unremarkable, found to have CONS 2/4 blood culture bottles with repeat negative.  He has been on empiric vancomycin and ceftriaxone and b/l LE erythema has improved and overall (combined with other interventions) his mental status has improved.  He remains deconditioned.    7/19: reconsulted for hypothermia. Pt was treated for 7 days on  vancomycin for treatment of cellulitis, blood culture that was + for CONs felt to be contaminate. He has been persistently hypothermic, requiring warming blanket for the past day. Pt states that he otherwise feels well. He was started on vanc and cefepime for empiric treatment.    Past Medical History:   Diagnosis Date    Anticoagulant long-term use     Arthritis     Back pain     Cellulitis     Cirrhosis     Coronary artery disease     DM (diabetes mellitus)     Hearing loss     right ear    Hepatic encephalopathy     Hypertension     diagnosed today (11/25/2015) and prescribed toprol    Leg edema     Nephrolithiasis     JACK (obstructive sleep apnea)     Seizures     Splenomegaly        Past Surgical History:   Procedure Laterality Date    APPENDECTOMY      Open    BACK SURGERY      CHOLECYSTECTOMY      laprascopic    COLONOSCOPY N/A 1/25/2018    Procedure: COLONOSCOPY;  Surgeon: Lashawn Myles MD;  Location: [facility] ([address] [place] [address]);  Service: Endoscopy;  Laterality: N/A;    LUMBAR DISCECTOMY      SPLENIC ARTERY EMBOLIZATION  04/08/2016    Dr Taveras    TONSILLECTOMY         Review of patient's allergies indicates:   Allergen Reactions    Nsaids (non-steroidal anti-inflammatory drug)      D/t to liver disease.    Tylenol [acetaminophen]      D/t liver disease.    Penicillins Other (See Comments)     Tolerated pip-tazo in 8/2016 without issue  Since childhood was told not to take it       Medications:  Prescriptions Prior to Admission   Medication Sig    ascorbic acid (VITAMIN C) 1000 MG tablet Take 1,000 mg by mouth once daily.    aspirin (ECOTRIN) 81 MG EC tablet Take 1 tablet (81 mg total) by mouth once daily.    atorvastatin (LIPITOR) 40 MG tablet TAKE 1 TABLET(40 MG) BY MOUTH EVERY DAY    docusate sodium (COLACE) 100 MG capsule Take 100 mg by mouth once daily.     ferrous gluconate 270 mg (27 mg iron) Tab Take 1 tablet by mouth once daily.    fish oil-omega-3 fatty acids 300-1,000 mg  capsule Take 2 capsules (2 g total) by mouth once daily. (Patient taking differently: Take 2 g by mouth 2 (two) times daily. )    furosemide (LASIX) 80 MG tablet Take 1 tablet (80 mg total) by mouth 2 (two) times daily.    GENERLAC 10 gram/15 mL solution Take 60 mLs (40 g total) by mouth 3 (three) times daily. 60mg three times per day (Patient taking differently: Take 60 mLs by mouth 3 (three) times daily. )    levETIRAcetam (KEPPRA) 500 MG Tab Take 1 tablet (500 mg total) by mouth 2 (two) times daily.    magnesium oxide (MAG-OX) 400 mg tablet Take 1 tablet (400 mg total) by mouth once daily.    ondansetron (ZOFRAN) 4 MG tablet Take 1 tablet (4 mg total) by mouth every 8 (eight) hours as needed for Nausea.    pantoprazole (PROTONIX) 40 MG tablet Take 1 tablet (40 mg total) by mouth 2 (two) times daily before meals.    rifAXIMin (XIFAXAN) 550 mg Tab Take 1 tablet (550 mg total) by mouth 2 (two) times daily.    sodium bicarbonate 650 MG tablet Take 1 tablet (650 mg total) by mouth 2 (two) times daily.    spironolactone (ALDACTONE) 100 MG tablet Take 2 tablets (200 mg total) by mouth 2 (two) times daily.    tramadol (ULTRAM) 50 mg tablet Take 50 mg by mouth every 6 (six) hours as needed for Pain.     Antibiotics     Start     Stop Route Frequency Ordered    07/17/18 1845  ceFEPIme injection 1 g      -- IV Every 12 hours (non-standard times) 07/17/18 0946    07/15/18 1840  rifAXIMin tablet 550 mg      -- Oral 2 times daily 07/15/18 1841        Antifungals     Start     Stop Route Frequency Ordered    07/14/18 0900  fluconazole tablet 200 mg      -- Oral Daily 07/14/18 0806        Antivirals     None           Immunization History   Administered Date(s) Administered    Hepatitis A, Adult 10/30/2015, 05/12/2016    Hepatitis B, Adult 10/30/2015, 11/25/2015, 05/12/2016, 11/22/2016       Family History     None        Social History     Social History    Marital status:      Spouse name: N/A    Number of  "children: N/A    Years of education: N/A     Social History Main Topics    Smoking status: Never Smoker    Smokeless tobacco: Current User     Types: Chew    Alcohol use No    Drug use: No    Sexual activity: Not Asked     Other Topics Concern    None     Social History Narrative    None     Review of Systems   Constitutional: Negative for activity change, appetite change, chills and fever.        "feels cold"   HENT: Negative for congestion, dental problem, ear pain and rhinorrhea.    Eyes: Negative for pain and discharge.   Respiratory: Negative for cough and shortness of breath.    Cardiovascular: Negative for chest pain and leg swelling.   Gastrointestinal: Negative for abdominal distention, abdominal pain, constipation, diarrhea, nausea and vomiting.   Genitourinary: Negative for difficulty urinating and dysuria.   Musculoskeletal: Negative for arthralgias, back pain and joint swelling.   Skin: Negative for rash and wound.   Neurological: Negative for dizziness, light-headedness and headaches.   Psychiatric/Behavioral: Negative for behavioral problems and confusion.     Objective:     Vital Signs (Most Recent):  Temp: (!) 94.2 °F (34.6 °C) (07/19/18 1615)  Pulse: 73 (07/19/18 1605)  Resp: 16 (07/19/18 1605)  BP: (!) 117/57 (07/19/18 1605)  SpO2: 96 % (07/19/18 1605) Vital Signs (24h Range):  Temp:  [94.2 °F (34.6 °C)-97.9 °F (36.6 °C)] 94.2 °F (34.6 °C)  Pulse:  [72-89] 73  Resp:  [16-18] 16  SpO2:  [94 %-99 %] 96 %  BP: (103-123)/(50-58) 117/57     Weight: 126.3 kg (278 lb 7.1 oz)  Body mass index is 35.75 kg/m².    Estimated Creatinine Clearance: 37.3 mL/min (A) (based on SCr of 2.9 mg/dL (H)).    Physical Exam   Constitutional:   Temporal wasting   Eyes: EOM are normal.   Neck: Normal range of motion. Neck supple.   Cardiovascular: Normal rate.    Pulmonary/Chest: Effort normal and breath sounds normal. No respiratory distress.   Musculoskeletal:   Bilateral lower extremity stasis skin changes, no " open wounds, slight weeping of serous fluid.   Skin: Skin is warm.   Psychiatric: He has a normal mood and affect. His behavior is normal.       Significant Labs:   Blood Culture:   Recent Labs  Lab 07/09/18  1217 07/09/18  1218 07/12/18  0943 07/13/18  0822 07/17/18  1130   LABBLOO No growth after 5 days. No growth after 5 days. No growth after 5 days.  No growth after 5 days. No growth after 5 days.  No growth after 5 days. No Growth to date  No Growth to date  No Growth to date     CBC:   Recent Labs  Lab 07/18/18  0430 07/18/18  1600 07/19/18  0514   WBC 4.01 4.51 3.50*   HGB 7.9* 8.7* 8.2*   HCT 23.5* 25.6* 25.6*   PLT 34* 31* 30*     CMP:   Recent Labs  Lab 07/18/18  0430 07/18/18  1456 07/18/18  2218 07/19/18  0514 07/19/18  1403     139 139 142 143 140   K 4.0  4.0 4.1 4.1 4.1 4.3   *  115* 115* 117* 118* 114*   CO2 16*  16* 17* 17* 18* 17*   *  136* 199* 165* 141* 152*   BUN 51*  51* 53* 55* 58* 59*   CREATININE 2.6*  2.6* 2.6* 2.8* 2.9* 2.9*   CALCIUM 9.0  9.0 9.1 9.4 9.4 9.6   PROT 4.7* 5.0*  --  5.0*  --    ALBUMIN 2.3* 2.3*  --  2.5*  --    BILITOT 2.1* 2.3*  --  2.1*  --    ALKPHOS 92 100  --  107  --    AST 58* 64*  --  62*  --    ALT 38 41  --  39  --    ANIONGAP 8  8 7* 8 7* 9   EGFRNONAA 25.3*  25.3* 25.3* 23.1* 22.2* 22.2*       Significant Imaging: I have reviewed all pertinent imaging results/findings within the past 24 hours.

## 2018-07-19 NOTE — PLAN OF CARE
Problem: Patient Care Overview  Goal: Plan of Care Review  Outcome: Ongoing (interventions implemented as appropriate)  AAOx3, 97.4-97.8 oral temp throughout the night- with heating blanket on bed. [place] in place- sediment noted to NP. 1 episode of emesis throughout night. Cream applied to buttocks area.  Pt being repositioned Q2 hrs. Pt in lowest position, side rails up x3, non-skid foot wear in place, call light within reach, pt verbalized understanding to call RN when needed. Hand hygiene practiced per protocol. Will continue to monitor.

## 2018-07-19 NOTE — NURSING
07/19/18 1605 07/19/18 1615   Vital Signs   Temp --  (!) 94.2 °F (34.6 °C)   Temp src --  Rectal   Pulse 73 --    Heart Rate Source Monitor --    Resp 16 --    SpO2 96 % --    O2 Device (Oxygen Therapy) room air --    BP (!) 117/57 --    MAP (mmHg) 82 --    BP Location Right arm --    Patient Position Lying --      Charge GUTIERREZ Mojica notified.  Verbalized understanding.

## 2018-07-19 NOTE — ASSESSMENT & PLAN NOTE
- VICKY initially improving with albumin infusion but with minimal intake 6/25 and 6/26.  - dc sodium bicarb due to hypernatremia  - nephrology consulted due to hypervolemia and multiple electrolyte abnormalities (acidosis, hypernatremia)  - hold diuretics as worse Cr level a this time.

## 2018-07-19 NOTE — ASSESSMENT & PLAN NOTE
- Completed vanc x 7 days for BLE cellulitis  - 2D Echo 7/9 normal EF (55-60%), trivial tricuspid/mitral regurg.  - diuretics on hold, due to rise in CR, monitoring closely.   - Vanc now restarted given recent episodes of hypothermia.

## 2018-07-19 NOTE — ASSESSMENT & PLAN NOTE
62 yo male with acute decompensated liver failure secondary to alcohol cirrhosis now with sCr 1.7, showing improvement.  Nephrology consulted for VICKY.  Etiology most likely pre-renal.  Also considered HRS but less likely given optimal BP and renal function labs.     -sCr at baseline 1.4-1.5, sCr stable at 1.7 this morning, UOP increasing and about 30ccs per hour this morning  -no need for RRT, continue to monitor serial RFPs and strict Is & Os  - Avoid nephrotoxic medications  - Maintain MAP >65  - Hb > 7gm/dL  - ABGs, U/A, Labs in AM

## 2018-07-19 NOTE — PLAN OF CARE
Problem: Physical Therapy Goal  Goal: Physical Therapy Goal  Goals to be met by: 18     Patient will increase functional independence with mobility by performin. Supine to sit with Stand-by Assistance.  2. Sit to stand with Supervision.  3. Gait x 200 ft with Supervision with RW.  4. Ascend/descend 5 stair with right Handrails with SBA.  5. Lower extremity exercise program x 20 reps, with supervision, in order to increase LE strength and (I) with functional mobility.              Outcome: Ongoing (interventions implemented as appropriate)  Goals reviewed and remain appropriate. Pt progressing towards goals.    Lucille Craig, PT, DPT   2018  [phone]

## 2018-07-19 NOTE — TELEPHONE ENCOUNTER
PATIENT NAME: Alon Muller [facility] #: [id number]    Lab Results   Component Value Date    CREATININE 2.9 (H) 07/19/2018     07/19/2018    BILITOT 2.1 (H) 07/19/2018    ALBUMIN 2.5 (L) 07/19/2018    INR 1.6 (H) 07/19/2018     MELD 25  Encephalopathy: 1 - 2  Ascites: moderate  Dialysis: no     Recertification requestor: Yady Dietz

## 2018-07-19 NOTE — ASSESSMENT & PLAN NOTE
- H&H decreased on 7/18 and one unit of PRBCs given for replacement at that time.   - With good response to transfusion.   - monitor.

## 2018-07-19 NOTE — NURSING
07/19/18 1615   Vital Signs   Temp (!) 94.2 °F (34.6 °C)   Temp src Rectal     Notified ELLIOT Lopez NP, of the above temperature.  Orders received to place Ben hugger and re-check temp in 1 hr.  RN will do so and continue to monitor.

## 2018-07-19 NOTE — SUBJECTIVE & OBJECTIVE
Past Medical History:   Diagnosis Date    Anticoagulant long-term use     Arthritis     Back pain     Cellulitis     Cirrhosis     Coronary artery disease     DM (diabetes mellitus)     Hearing loss     right ear    Hepatic encephalopathy     Hypertension     diagnosed today (11/25/2015) and prescribed toprol    Leg edema     Nephrolithiasis     JACK (obstructive sleep apnea)     Seizures     Splenomegaly        Past Surgical History:   Procedure Laterality Date    APPENDECTOMY      Open    BACK SURGERY      CHOLECYSTECTOMY      laprascopic    COLONOSCOPY N/A 1/25/2018    Procedure: COLONOSCOPY;  Surgeon: Lashawn Myles MD;  Location: [address]);  Service: Endoscopy;  Laterality: N/A;    LUMBAR DISCECTOMY      SPLENIC ARTERY EMBOLIZATION  04/08/2016    Dr Taveras    TONSILLECTOMY         Review of patient's allergies indicates:   Allergen Reactions    Nsaids (non-steroidal anti-inflammatory drug)      D/t to liver disease.    Tylenol [acetaminophen]      D/t liver disease.    Penicillins Other (See Comments)     Tolerated pip-tazo in 8/2016 without issue  Since childhood was told not to take it       Medications:  Prescriptions Prior to Admission   Medication Sig    ascorbic acid (VITAMIN C) 1000 MG tablet Take 1,000 mg by mouth once daily.    aspirin (ECOTRIN) 81 MG EC tablet Take 1 tablet (81 mg total) by mouth once daily.    atorvastatin (LIPITOR) 40 MG tablet TAKE 1 TABLET(40 MG) BY MOUTH EVERY DAY    docusate sodium (COLACE) 100 MG capsule Take 100 mg by mouth once daily.     ferrous gluconate 270 mg (27 mg iron) Tab Take 1 tablet by mouth once daily.    fish oil-omega-3 fatty acids 300-1,000 mg capsule Take 2 capsules (2 g total) by mouth once daily. (Patient taking differently: Take 2 g by mouth 2 (two) times daily. )    furosemide (LASIX) 80 MG tablet Take 1 tablet (80 mg total) by mouth 2 (two) times daily.    GENERLAC 10 gram/15 mL solution Take 60 mLs (40 g total)  by mouth 3 (three) times daily. 60mg three times per day (Patient taking differently: Take 60 mLs by mouth 3 (three) times daily. )    levETIRAcetam (KEPPRA) 500 MG Tab Take 1 tablet (500 mg total) by mouth 2 (two) times daily.    magnesium oxide (MAG-OX) 400 mg tablet Take 1 tablet (400 mg total) by mouth once daily.    ondansetron (ZOFRAN) 4 MG tablet Take 1 tablet (4 mg total) by mouth every 8 (eight) hours as needed for Nausea.    pantoprazole (PROTONIX) 40 MG tablet Take 1 tablet (40 mg total) by mouth 2 (two) times daily before meals.    rifAXIMin (XIFAXAN) 550 mg Tab Take 1 tablet (550 mg total) by mouth 2 (two) times daily.    sodium bicarbonate 650 MG tablet Take 1 tablet (650 mg total) by mouth 2 (two) times daily.    spironolactone (ALDACTONE) 100 MG tablet Take 2 tablets (200 mg total) by mouth 2 (two) times daily.    tramadol (ULTRAM) 50 mg tablet Take 50 mg by mouth every 6 (six) hours as needed for Pain.     Antibiotics     Start     Stop Route Frequency Ordered    07/17/18 1845  ceFEPIme injection 1 g      -- IV Every 12 hours (non-standard times) 07/17/18 0946    07/15/18 1840  rifAXIMin tablet 550 mg      -- Oral 2 times daily 07/15/18 1841        Antifungals     Start     Stop Route Frequency Ordered    07/14/18 0900  fluconazole tablet 200 mg      -- Oral Daily 07/14/18 0806        Antivirals     None           Immunization History   Administered Date(s) Administered    Hepatitis A, Adult 10/30/2015, 05/12/2016    Hepatitis B, Adult 10/30/2015, 11/25/2015, 05/12/2016, 11/22/2016       Family History     None        Social History     Social History    Marital status:      Spouse name: N/A    Number of children: N/A    Years of education: N/A     Social History Main Topics    Smoking status: Never Smoker    Smokeless tobacco: Current User     Types: Chew    Alcohol use No    Drug use: No    Sexual activity: Not Asked     Other Topics Concern    None     Social History  "Narrative    None     Review of Systems   Constitutional: Negative for activity change, appetite change, chills and fever.        "feels cold"   HENT: Negative for congestion, dental problem, ear pain and rhinorrhea.    Eyes: Negative for pain and discharge.   Respiratory: Negative for cough and shortness of breath.    Cardiovascular: Negative for chest pain and leg swelling.   Gastrointestinal: Negative for abdominal distention, abdominal pain, constipation, diarrhea, nausea and vomiting.   Genitourinary: Negative for difficulty urinating and dysuria.   Musculoskeletal: Negative for arthralgias, back pain and joint swelling.   Skin: Negative for rash and wound.   Neurological: Negative for dizziness, light-headedness and headaches.   Psychiatric/Behavioral: Negative for behavioral problems and confusion.     Objective:     Vital Signs (Most Recent):  Temp: (!) 94.2 °F (34.6 °C) (07/19/18 1615)  Pulse: 73 (07/19/18 1605)  Resp: 16 (07/19/18 1605)  BP: (!) 117/57 (07/19/18 1605)  SpO2: 96 % (07/19/18 1605) Vital Signs (24h Range):  Temp:  [94.2 °F (34.6 °C)-97.9 °F (36.6 °C)] 94.2 °F (34.6 °C)  Pulse:  [72-89] 73  Resp:  [16-18] 16  SpO2:  [94 %-99 %] 96 %  BP: (103-123)/(50-58) 117/57     Weight: 126.3 kg (278 lb 7.1 oz)  Body mass index is 35.75 kg/m².    Estimated Creatinine Clearance: 37.3 mL/min (A) (based on SCr of 2.9 mg/dL (H)).    Physical Exam   Constitutional:   Temporal wasting   Eyes: EOM are normal.   Neck: Normal range of motion. Neck supple.   Cardiovascular: Normal rate.    Pulmonary/Chest: Effort normal and breath sounds normal. No respiratory distress.   Musculoskeletal:   Bilateral lower extremity stasis skin changes, no open wounds, slight weeping of serous fluid.   Skin: Skin is warm.   Psychiatric: He has a normal mood and affect. His behavior is normal.       Significant Labs:   Blood Culture:   Recent Labs  Lab 07/09/18  1217 07/09/18  1218 07/12/18  0943 07/13/18  0822 07/17/18  1130 "   LABBLOO No growth after 5 days. No growth after 5 days. No growth after 5 days.  No growth after 5 days. No growth after 5 days.  No growth after 5 days. No Growth to date  No Growth to date  No Growth to date     CBC:   Recent Labs  Lab 07/18/18  0430 07/18/18  1600 07/19/18  0514   WBC 4.01 4.51 3.50*   HGB 7.9* 8.7* 8.2*   HCT 23.5* 25.6* 25.6*   PLT 34* 31* 30*     CMP:   Recent Labs  Lab 07/18/18  0430 07/18/18  1456 07/18/18  2218 07/19/18  0514 07/19/18  1403     139 139 142 143 140   K 4.0  4.0 4.1 4.1 4.1 4.3   *  115* 115* 117* 118* 114*   CO2 16*  16* 17* 17* 18* 17*   *  136* 199* 165* 141* 152*   BUN 51*  51* 53* 55* 58* 59*   CREATININE 2.6*  2.6* 2.6* 2.8* 2.9* 2.9*   CALCIUM 9.0  9.0 9.1 9.4 9.4 9.6   PROT 4.7* 5.0*  --  5.0*  --    ALBUMIN 2.3* 2.3*  --  2.5*  --    BILITOT 2.1* 2.3*  --  2.1*  --    ALKPHOS 92 100  --  107  --    AST 58* 64*  --  62*  --    ALT 38 41  --  39  --    ANIONGAP 8  8 7* 8 7* 9   EGFRNONAA 25.3*  25.3* 25.3* 23.1* 22.2* 22.2*       Significant Imaging: I have reviewed all pertinent imaging results/findings within the past 24 hours.

## 2018-07-19 NOTE — ASSESSMENT & PLAN NOTE
- ESLD secondary to EtOH cirrhosis with severe complications (hyperbilirubinemia, hypoalbuminemia, severe malnutrition, hepatic encephalopathy, thrombocytopenia, splenomegaly, ascites, hypervolemia, coaguloopathy, portal HTN), seizure disorder on Keppra, CKD3, CAD   - has frequent episodes of lethargy/confusion/slowing when holding lactulose   - admitted to SICU 7/13/18 for worsening encephalopathy and hypothermia  - PO lactulose TID, PRN lactulose enemas, Rifaximin  - more alert today. Continue oral lactulose and PRN lactulose enemas for HE control.

## 2018-07-19 NOTE — PLAN OF CARE
Problem: Patient Care Overview  Goal: Plan of Care Review  Outcome: Ongoing (interventions implemented as appropriate)  Pt AAOx4, VSS, in NAD throughout shift.  Pt with slowed and hushed speech, often takes a couple of tries for RN to understand the pt.   Pt up in wheelchair x1 and sat at hallway window.  Pt up to bedside commode x1, on bedpan x2, 3 BMs total so far this shift.  Pt tolerating all medications and interventions well.  Pt with one episode of nausea and vomiting, vomited 850 cc liquid/food mix shortly after lunch.  Pt turned q2hr to prevent worsening of wound on buttocks, barrier cream applied x1 so far this shift.  RN encouraging questions, all answered to pt's satisfaction.  RN maintaining fall risk precautions throughout shift.  Pt denies pain or other need at this time; RN will continue to monitor, assess, and alter plan of care as needed until report given to oncoming night shift nurse.

## 2018-07-19 NOTE — ASSESSMENT & PLAN NOTE
- with episodes of hypothermia on 7/17 and 7/18.   - BS antibxs on board for prophylaxis.   - blood cultures NGTD.   - ID consulted for further management.

## 2018-07-19 NOTE — NURSING
1755:  RN observed pt in deeper sleep than previously this shift: responsive to voice and touch, arousing to loud voice and repeated stimulation, oriented x4.  RN will continue to monitor.    1825:  RN observed improvement in sleep level in pt: responsive to voice, aroused to speaking-level voice and with first questioning, oriented x4.  Temperature 98.0 deg F.  RN will continue to monitor.

## 2018-07-20 ENCOUNTER — TELEPHONE (OUTPATIENT)
Dept: TRANSPLANT | Facility: HOSPITAL | Age: 63
End: 2018-07-20

## 2018-07-20 ENCOUNTER — TELEPHONE (OUTPATIENT)
Dept: TRANSPLANT | Facility: CLINIC | Age: 63
End: 2018-07-20

## 2018-07-20 PROBLEM — I87.323 CHRONIC VENOUS HYPERTENSION (IDIOPATHIC) WITH INFLAMMATION OF BILATERAL LOWER EXTREMITY: Status: ACTIVE | Noted: 2018-07-20

## 2018-07-20 PROBLEM — Z76.82 ORGAN TRANSPLANT CANDIDATE: Status: ACTIVE | Noted: 2018-07-20

## 2018-07-20 LAB
ALBUMIN SERPL BCP-MCNC: 2.3 G/DL
ALP SERPL-CCNC: 93 U/L
ALT SERPL W/O P-5'-P-CCNC: 38 U/L
ANION GAP SERPL CALC-SCNC: 10 MMOL/L
ANION GAP SERPL CALC-SCNC: 11 MMOL/L
ANION GAP SERPL CALC-SCNC: 12 MMOL/L
ANION GAP SERPL CALC-SCNC: 8 MMOL/L
ANISOCYTOSIS BLD QL SMEAR: SLIGHT
ANISOCYTOSIS BLD QL SMEAR: SLIGHT
AST SERPL-CCNC: 63 U/L
BASOPHILS # BLD AUTO: 0.03 K/UL
BASOPHILS # BLD AUTO: 0.04 K/UL
BASOPHILS NFR BLD: 0.9 %
BASOPHILS NFR BLD: 1 %
BILIRUB SERPL-MCNC: 1.8 MG/DL
BUN SERPL-MCNC: 63 MG/DL
BUN SERPL-MCNC: 64 MG/DL
BUN SERPL-MCNC: 65 MG/DL
BUN SERPL-MCNC: 69 MG/DL
BURR CELLS BLD QL SMEAR: ABNORMAL
BURR CELLS BLD QL SMEAR: ABNORMAL
CALCIUM SERPL-MCNC: 9.2 MG/DL
CALCIUM SERPL-MCNC: 9.3 MG/DL
CALCIUM SERPL-MCNC: 9.8 MG/DL
CALCIUM SERPL-MCNC: 9.8 MG/DL
CHLORIDE SERPL-SCNC: 117 MMOL/L
CHLORIDE SERPL-SCNC: 118 MMOL/L
CHLORIDE SERPL-SCNC: 118 MMOL/L
CHLORIDE SERPL-SCNC: 120 MMOL/L
CO2 SERPL-SCNC: 15 MMOL/L
CO2 SERPL-SCNC: 16 MMOL/L
CO2 SERPL-SCNC: 17 MMOL/L
CO2 SERPL-SCNC: 17 MMOL/L
CORTIS SERPL-MCNC: 9.9 UG/DL
CREAT SERPL-MCNC: 3.2 MG/DL
CREAT SERPL-MCNC: 3.3 MG/DL
CREAT SERPL-MCNC: 3.5 MG/DL
CREAT SERPL-MCNC: 3.5 MG/DL
DIFFERENTIAL METHOD: ABNORMAL
DIFFERENTIAL METHOD: ABNORMAL
EOSINOPHIL # BLD AUTO: 0.3 K/UL
EOSINOPHIL # BLD AUTO: 0.4 K/UL
EOSINOPHIL NFR BLD: 9.1 %
EOSINOPHIL NFR BLD: 9.6 %
ERYTHROCYTE [DISTWIDTH] IN BLOOD BY AUTOMATED COUNT: 20.5 %
ERYTHROCYTE [DISTWIDTH] IN BLOOD BY AUTOMATED COUNT: 20.7 %
EST. GFR  (AFRICAN AMERICAN): 20.4 ML/MIN/1.73 M^2
EST. GFR  (AFRICAN AMERICAN): 20.4 ML/MIN/1.73 M^2
EST. GFR  (AFRICAN AMERICAN): 21.9 ML/MIN/1.73 M^2
EST. GFR  (AFRICAN AMERICAN): 22.8 ML/MIN/1.73 M^2
EST. GFR  (NON AFRICAN AMERICAN): 17.7 ML/MIN/1.73 M^2
EST. GFR  (NON AFRICAN AMERICAN): 17.7 ML/MIN/1.73 M^2
EST. GFR  (NON AFRICAN AMERICAN): 19 ML/MIN/1.73 M^2
EST. GFR  (NON AFRICAN AMERICAN): 19.7 ML/MIN/1.73 M^2
GLUCOSE SERPL-MCNC: 107 MG/DL
GLUCOSE SERPL-MCNC: 110 MG/DL
GLUCOSE SERPL-MCNC: 151 MG/DL
GLUCOSE SERPL-MCNC: 167 MG/DL
HCT VFR BLD AUTO: 24.9 %
HCT VFR BLD AUTO: 26.7 %
HCT VFR BLD AUTO: 27.1 %
HGB BLD-MCNC: 8.2 G/DL
HGB BLD-MCNC: 8.5 G/DL
HGB BLD-MCNC: 8.9 G/DL
HYPOCHROMIA BLD QL SMEAR: ABNORMAL
IMM GRANULOCYTES # BLD AUTO: 0.02 K/UL
IMM GRANULOCYTES # BLD AUTO: 0.02 K/UL
IMM GRANULOCYTES NFR BLD AUTO: 0.5 %
IMM GRANULOCYTES NFR BLD AUTO: 0.6 %
INR PPP: 1.6
LYMPHOCYTES # BLD AUTO: 0.9 K/UL
LYMPHOCYTES # BLD AUTO: 0.9 K/UL
LYMPHOCYTES NFR BLD: 23.5 %
LYMPHOCYTES NFR BLD: 26 %
MAGNESIUM SERPL-MCNC: 3.1 MG/DL
MCH RBC QN AUTO: 34.2 PG
MCH RBC QN AUTO: 34.3 PG
MCHC RBC AUTO-ENTMCNC: 32.9 G/DL
MCHC RBC AUTO-ENTMCNC: 33.3 G/DL
MCV RBC AUTO: 103 FL
MCV RBC AUTO: 104 FL
MONOCYTES # BLD AUTO: 0.7 K/UL
MONOCYTES # BLD AUTO: 0.9 K/UL
MONOCYTES NFR BLD: 20.3 %
MONOCYTES NFR BLD: 22 %
NEUTROPHILS # BLD AUTO: 1.5 K/UL
NEUTROPHILS # BLD AUTO: 1.7 K/UL
NEUTROPHILS NFR BLD: 43.1 %
NEUTROPHILS NFR BLD: 43.4 %
NRBC BLD-RTO: 0 /100 WBC
NRBC BLD-RTO: 1 /100 WBC
OVALOCYTES BLD QL SMEAR: ABNORMAL
OVALOCYTES BLD QL SMEAR: ABNORMAL
PLATELET # BLD AUTO: 31 K/UL
PLATELET # BLD AUTO: 34 K/UL
PLATELET BLD QL SMEAR: ABNORMAL
PLATELET BLD QL SMEAR: ABNORMAL
PMV BLD AUTO: 14.7 FL
PMV BLD AUTO: ABNORMAL FL
POIKILOCYTOSIS BLD QL SMEAR: ABNORMAL
POIKILOCYTOSIS BLD QL SMEAR: SLIGHT
POLYCHROMASIA BLD QL SMEAR: ABNORMAL
POTASSIUM SERPL-SCNC: 4.3 MMOL/L
POTASSIUM SERPL-SCNC: 4.4 MMOL/L
POTASSIUM SERPL-SCNC: 4.6 MMOL/L
POTASSIUM SERPL-SCNC: 4.6 MMOL/L
PROT SERPL-MCNC: 4.9 G/DL
PROTHROMBIN TIME: 16.3 SEC
RBC # BLD AUTO: 2.39 M/UL
RBC # BLD AUTO: 2.6 M/UL
SODIUM SERPL-SCNC: 143 MMOL/L
SODIUM SERPL-SCNC: 145 MMOL/L
SODIUM SERPL-SCNC: 145 MMOL/L
SODIUM SERPL-SCNC: 146 MMOL/L
T4 FREE SERPL-MCNC: 0.53 NG/DL
TSH SERPL DL<=0.005 MIU/L-ACNC: 2.71 UIU/ML
VANCOMYCIN SERPL-MCNC: 13.4 UG/ML
WBC # BLD AUTO: 3.5 K/UL
WBC # BLD AUTO: 3.95 K/UL

## 2018-07-20 PROCEDURE — 80048 BASIC METABOLIC PNL TOTAL CA: CPT | Mod: NTX

## 2018-07-20 PROCEDURE — 99233 SBSQ HOSP IP/OBS HIGH 50: CPT | Mod: NTX,,, | Performed by: INTERNAL MEDICINE

## 2018-07-20 PROCEDURE — 80202 ASSAY OF VANCOMYCIN: CPT | Mod: NTX

## 2018-07-20 PROCEDURE — 25000003 PHARM REV CODE 250: Mod: NTX | Performed by: STUDENT IN AN ORGANIZED HEALTH CARE EDUCATION/TRAINING PROGRAM

## 2018-07-20 PROCEDURE — 63600175 PHARM REV CODE 636 W HCPCS: Mod: NTX | Performed by: NURSE PRACTITIONER

## 2018-07-20 PROCEDURE — 85018 HEMOGLOBIN: CPT | Mod: NTX

## 2018-07-20 PROCEDURE — 84443 ASSAY THYROID STIM HORMONE: CPT | Mod: NTX

## 2018-07-20 PROCEDURE — 99232 SBSQ HOSP IP/OBS MODERATE 35: CPT | Mod: GC,NTX,, | Performed by: INTERNAL MEDICINE

## 2018-07-20 PROCEDURE — 63600175 PHARM REV CODE 636 W HCPCS: Mod: NTX | Performed by: PHYSICIAN ASSISTANT

## 2018-07-20 PROCEDURE — C1751 CATH, INF, PER/CENT/MIDLINE: HCPCS | Mod: NTX

## 2018-07-20 PROCEDURE — 25000003 PHARM REV CODE 250: Mod: NTX | Performed by: INTERNAL MEDICINE

## 2018-07-20 PROCEDURE — 36569 INSJ PICC 5 YR+ W/O IMAGING: CPT | Mod: NTX

## 2018-07-20 PROCEDURE — 99233 SBSQ HOSP IP/OBS HIGH 50: CPT | Mod: NTX,,, | Performed by: PHYSICIAN ASSISTANT

## 2018-07-20 PROCEDURE — 80053 COMPREHEN METABOLIC PANEL: CPT | Mod: NTX

## 2018-07-20 PROCEDURE — 82570 ASSAY OF URINE CREATININE: CPT | Mod: NTX

## 2018-07-20 PROCEDURE — 25000003 PHARM REV CODE 250: Mod: NTX | Performed by: PHYSICIAN ASSISTANT

## 2018-07-20 PROCEDURE — 97116 GAIT TRAINING THERAPY: CPT | Mod: NTX

## 2018-07-20 PROCEDURE — 85025 COMPLETE CBC W/AUTO DIFF WBC: CPT | Mod: 91,NTX

## 2018-07-20 PROCEDURE — 25000003 PHARM REV CODE 250: Mod: NTX | Performed by: NURSE PRACTITIONER

## 2018-07-20 PROCEDURE — 83735 ASSAY OF MAGNESIUM: CPT | Mod: NTX

## 2018-07-20 PROCEDURE — 97530 THERAPEUTIC ACTIVITIES: CPT | Mod: NTX

## 2018-07-20 PROCEDURE — 85014 HEMATOCRIT: CPT | Mod: NTX

## 2018-07-20 PROCEDURE — 97110 THERAPEUTIC EXERCISES: CPT | Mod: NTX

## 2018-07-20 PROCEDURE — 84439 ASSAY OF FREE THYROXINE: CPT | Mod: NTX

## 2018-07-20 PROCEDURE — 36415 COLL VENOUS BLD VENIPUNCTURE: CPT | Mod: NTX

## 2018-07-20 PROCEDURE — 99223 1ST HOSP IP/OBS HIGH 75: CPT | Mod: GC,NTX,, | Performed by: INTERNAL MEDICINE

## 2018-07-20 PROCEDURE — P9047 ALBUMIN (HUMAN), 25%, 50ML: HCPCS | Mod: JG,NTX | Performed by: PHYSICIAN ASSISTANT

## 2018-07-20 PROCEDURE — 85610 PROTHROMBIN TIME: CPT | Mod: NTX

## 2018-07-20 PROCEDURE — 82533 TOTAL CORTISOL: CPT | Mod: NTX

## 2018-07-20 PROCEDURE — 20600001 HC STEP DOWN PRIVATE ROOM: Mod: NTX

## 2018-07-20 PROCEDURE — 76937 US GUIDE VASCULAR ACCESS: CPT | Mod: NTX

## 2018-07-20 RX ORDER — TRIAMCINOLONE ACETONIDE 1 MG/G
CREAM TOPICAL 2 TIMES DAILY
Status: DISCONTINUED | OUTPATIENT
Start: 2018-07-20 | End: 2018-08-13

## 2018-07-20 RX ORDER — OCTREOTIDE ACETATE 100 UG/ML
100 INJECTION, SOLUTION INTRAVENOUS; SUBCUTANEOUS EVERY 8 HOURS
Status: DISCONTINUED | OUTPATIENT
Start: 2018-07-20 | End: 2018-07-22

## 2018-07-20 RX ORDER — ALBUMIN HUMAN 250 G/1000ML
25 SOLUTION INTRAVENOUS
Status: COMPLETED | OUTPATIENT
Start: 2018-07-20 | End: 2018-07-20

## 2018-07-20 RX ORDER — FUROSEMIDE 10 MG/ML
80 INJECTION INTRAMUSCULAR; INTRAVENOUS ONCE
Status: COMPLETED | OUTPATIENT
Start: 2018-07-20 | End: 2018-07-20

## 2018-07-20 RX ADMIN — LACTULOSE 45 G: 20 SOLUTION ORAL at 08:07

## 2018-07-20 RX ADMIN — METOCLOPRAMIDE 10 MG: 5 INJECTION, SOLUTION INTRAMUSCULAR; INTRAVENOUS at 10:07

## 2018-07-20 RX ADMIN — SODIUM BICARBONATE 650 MG TABLET 1950 MG: at 08:07

## 2018-07-20 RX ADMIN — SERTRALINE HYDROCHLORIDE 50 MG: 50 TABLET ORAL at 08:07

## 2018-07-20 RX ADMIN — OCTREOTIDE ACETATE 100 MCG: 100 INJECTION, SOLUTION INTRAVENOUS; SUBCUTANEOUS at 02:07

## 2018-07-20 RX ADMIN — LACTULOSE 200 G: 10 SOLUTION ORAL at 12:07

## 2018-07-20 RX ADMIN — HEPARIN SODIUM 5000 UNITS: 5000 INJECTION, SOLUTION INTRAVENOUS; SUBCUTANEOUS at 06:07

## 2018-07-20 RX ADMIN — FUROSEMIDE 80 MG: 10 INJECTION, SOLUTION INTRAMUSCULAR; INTRAVENOUS at 02:07

## 2018-07-20 RX ADMIN — DOCUSATE SODIUM 100 MG: 100 CAPSULE, LIQUID FILLED ORAL at 08:07

## 2018-07-20 RX ADMIN — LEVETIRACETAM 500 MG: 500 TABLET, FILM COATED ORAL at 08:07

## 2018-07-20 RX ADMIN — ASPIRIN 81 MG: 81 TABLET, COATED ORAL at 08:07

## 2018-07-20 RX ADMIN — OXYCODONE HYDROCHLORIDE AND ACETAMINOPHEN 1000 MG: 500 TABLET ORAL at 08:07

## 2018-07-20 RX ADMIN — OCTREOTIDE ACETATE 100 MCG: 100 INJECTION, SOLUTION INTRAVENOUS; SUBCUTANEOUS at 10:07

## 2018-07-20 RX ADMIN — FLUCONAZOLE 200 MG: 200 TABLET ORAL at 08:07

## 2018-07-20 RX ADMIN — ONDANSETRON 4 MG: 4 TABLET, FILM COATED ORAL at 03:07

## 2018-07-20 RX ADMIN — CEFEPIME 1 G: 1 INJECTION, POWDER, FOR SOLUTION INTRAMUSCULAR; INTRAVENOUS at 06:07

## 2018-07-20 RX ADMIN — HEPARIN SODIUM 5000 UNITS: 5000 INJECTION, SOLUTION INTRAVENOUS; SUBCUTANEOUS at 02:07

## 2018-07-20 RX ADMIN — HEPARIN SODIUM 5000 UNITS: 5000 INJECTION, SOLUTION INTRAVENOUS; SUBCUTANEOUS at 08:07

## 2018-07-20 RX ADMIN — Medication 220 MG: at 08:07

## 2018-07-20 RX ADMIN — ALBUMIN HUMAN 25 G: 0.25 SOLUTION INTRAVENOUS at 11:07

## 2018-07-20 RX ADMIN — PANTOPRAZOLE SODIUM 40 MG: 40 TABLET, DELAYED RELEASE ORAL at 08:07

## 2018-07-20 RX ADMIN — LACTULOSE 200 G: 10 SOLUTION ORAL at 10:07

## 2018-07-20 RX ADMIN — TRIAMCINOLONE ACETONIDE: 1 CREAM TOPICAL at 10:07

## 2018-07-20 RX ADMIN — ALBUMIN HUMAN 25 G: 0.25 SOLUTION INTRAVENOUS at 08:07

## 2018-07-20 RX ADMIN — LACTULOSE 45 G: 20 SOLUTION ORAL at 02:07

## 2018-07-20 RX ADMIN — OMEGA-3-ACID ETHYL ESTERS 2 G: 1 CAPSULE, LIQUID FILLED ORAL at 08:07

## 2018-07-20 RX ADMIN — ATORVASTATIN CALCIUM 40 MG: 20 TABLET, FILM COATED ORAL at 08:07

## 2018-07-20 RX ADMIN — RIFAXIMIN 550 MG: 550 TABLET ORAL at 08:07

## 2018-07-20 RX ADMIN — SODIUM BICARBONATE 650 MG TABLET 1950 MG: at 02:07

## 2018-07-20 NOTE — HPI
Reason for Consult: Management of hypothermia and followed by consult on steroid induced hyperglycemia     Diabetes diagnosis year: >5 years by PCP but was later told he did not have diabetes. Was on glucovance for a short period of time but not taking any medication or checking BG prior to transplant.       HPI:   Patient is a 63 y.o. 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 Hospital course was complicated by hepatic encephalopathy. During the course, he has also had few episodes of hypothermia with temp as low as 93 F. He completed 7 day course of vancomycin for cellilitis, and is currently on vancomycin and cefepime for possible sepsis. However he continues to be hypothermic.     Further work-up of hypothermia was done, which showed TSH: 2.714, FT4: 0.53, AM cortisol: 9.9.

## 2018-07-20 NOTE — PLAN OF CARE
Problem: Physical Therapy Goal  Goal: Physical Therapy Goal  Goals to be met by: 18     Patient will increase functional independence with mobility by performin. Supine to sit with Stand-by Assistance.  2. Sit to stand with Supervision.  3. Gait x 200 ft with Supervision with RW.  4. Ascend/descend 5 stair with right Handrails with SBA.  5. Lower extremity exercise program x 20 reps, with supervision, in order to increase LE strength and (I) with functional mobility.              Goals remain appropriate. Continue with Physical therapy Plan of Care. Kenyetta Mcclure, PT 2018

## 2018-07-20 NOTE — PROGRESS NOTES
[facility]  Liver Transplant  Progress Note    Patient Name: Alon Adamson  MRN: [id number]  Admission Date: 6/22/2018  Hospital Length of Stay: 28 days  Code Status: Full Code  Primary Care Provider: Mckinley Vides MD    Subjective:     History of Present Illness:  Mr. Adamson is a 64 yo M with PMH ESLD secondary to ETOH cirrhosis, CKD3, CAD.  Complications of liver disease include hyperbilirubinemia, hypoalbuminemia, severe malnutrition,   hepatic encephalopathy, thrombocytopenia, splenomegaly, ascites, hypervolemia, coagulopathy, portal HTN.  Pt recently admitted for severe hepatic encephalopathy requiring intubation but has been stable from mentation standpoint post resolution of acute enceophalopathy. Mr. Adamson was seen in clinic 6/22 and was noted to have significant hypervolemia, weeping from BLE, and VICKY on CKD3 on routine labs.  His MELD increased from 22 to 26 per labs- coordinator notified and MELD updated.  Cr elevated to 2.3 from baseline of 1.4.  Also with worsening ascites.  Pt reported increased pruritis, generalized fatigue and weakness.  He presented in wheelchair.  ROS otherwise negative.      Hospital Course:  Patient is listed for liver transplant, placed on internal hold 6/25 for HE then status 7 on 6/26 due to change in prescription coverage. He was re-activated on the list 7/3, MELD 22. Pt started on IV diuretics 6/22 and continued 6/23 and 6/24.  BLE edema and ascites signif improved with diuresis and kidney function also improved. During admission, patient had worsening encephalopathy and asterixis prompting infectious work up, all blood and urine cultures obtained during admission with NGTD. Intermittently, encephalopathy severe enough to warrant lactulose enemas. CT head obtained and negative. Paracentesis 6/25 negative for peritonitis per cell count and U/A negative. Pt also with concern for BLE cellulitis- upper legs bright red with lower BLE still with dark  appearance of venous stasis. Placed on vancomycin with improvement in BLE redness 6/26. Vanc continued for 7 days (ended 7/1). His HE waxed and waned for several days requiring lactulose enemas. Pt eating minimally during period of encephalopathy requiring gentle hydration with IVF 6/26. Of note, taco placement attempted 6/26 but unable secondary to agitation upon insertion prompting self resolving nose bleed. Micro lab called 6/26 PM with blood cx + for G+C in blood- pt continued on vanc which was started 6/25. ID consulted, suspect + blood cultures contaminant. EEG obtained 6/26 as pt with h/o serizures and negative. Pt continued on home Keppra. VICKY on admission initially improved with diuresis, but Cr sakina intermittently during admission likely related to aggressive diuresis. Of note, with chest pain overnight 6/26-6/27 that resolved without intervention and pt reported as mild.  EKG NSR with PVC, troponin 0.1 in setting of hypervolemia + VICKY.  Normal dobutamine stress 1/2018.  Cardiology consulted and felt not ACS, no need for further workup other than continue to treat current risk factors for CAD with ASA (pt already on ASA and statin as outpt) + BB for portal hypertension which was started. Repeat paracentesis 7/10, 4.8L off, no SBP. Hypernatremia noted 7/9, nephrology consulted to aid in management. Hypernatremia attributed to dehydration from frequent BM's, diuretics were held, d/c'd creon due to excessive diarrhea, and patient was treated with increasing po free water and IVF. Echo 7/9 with normal EF, no wall abnormality, mild tricuspid/mitral regurg. Was sent to ICU 7/13/18 for lethargy/HE with hypotension and hypothermia. Received lactulose enemas and albumin bolus with improvement in mental status and BPS. Started on BS abx (cefepime/vanc/fluc), although Bcx and Ucx both NGTD.     Interval History: No acute events overnight. Patient more lethargic this AM. Received lactulose enema, more awake by mid day.  Kidney function continues to decline. KTM consulted for possible kidney transplant. Discussed kidney function with general nephrology as patient likely to need RRT in near future for volume management. Per Nephrology, want to do trial of lasix first and assess patient's response. Will give IV 80 mg Lasix. ID following. Continuing empiric Cefepime/Vanc. Will also consult dermatology as patient with ongoing erythema to extremities in setting of ongoing hypothermia. Endocrine also consulted for low free T4. MELD 25 today. Continue to monitor closely.       Scheduled Meds:   albumin human 25%  25 g Intravenous Q8H    ascorbic acid (vitamin C)  1,000 mg Oral Daily    atorvastatin  40 mg Oral Daily    ceFEPime (MAXIPIME) IVPB  1 g Intravenous Q12H    docusate sodium  100 mg Oral Daily    ergocalciferol  50,000 Units Oral Q7 Days    fluconazole  200 mg Oral Daily    furosemide  80 mg Intravenous Once    heparin (porcine)  5,000 Units Subcutaneous Q8H    lactulose  45 g Oral TID    levETIRAcetam  500 mg Oral BID    lidocaine  2 patch Transdermal Q24H    octreotide  100 mcg Subcutaneous Q8H    omega-3 acid ethyl esters  2 g Oral Daily with breakfast    pantoprazole  40 mg Oral BID    rifAXIMin  550 mg Oral BID    sertraline  50 mg Oral Daily    sodium bicarbonate  1,950 mg Oral TID    zinc sulfate  220 mg Oral BID     Continuous Infusions:  PRN Meds:sodium chloride, sodium chloride, acetaminophen, diphenhydrAMINE-zinc acetate 1-0.1%, lactulose, metoclopramide HCl, ondansetron, sodium chloride 0.9%    Review of Systems   Constitutional: Positive for appetite change and fatigue. Negative for activity change, chills and fever.   HENT: Negative for mouth sores, sore throat and trouble swallowing.    Eyes: Negative for visual disturbance.   Respiratory: Positive for shortness of breath (upon activity). Negative for wheezing.    Cardiovascular: Positive for leg swelling. Negative for chest pain.    Gastrointestinal: Positive for diarrhea (lactulose). Negative for abdominal distention, abdominal pain, nausea and vomiting.   Endocrine: Negative for polydipsia and polyuria.   Genitourinary: Negative for decreased urine volume and difficulty urinating.   Musculoskeletal: Positive for arthralgias (both shoulders have been hurting for last 2 weeks, right > left, improved with lidocaine patches).   Skin: Negative for rash.   Neurological: Positive for weakness. Negative for dizziness, syncope and headaches.   Hematological: Bruises/bleeds easily.   Psychiatric/Behavioral: Positive for decreased concentration. Negative for agitation, confusion, hallucinations and sleep disturbance.     Objective:     Vital Signs (Most Recent):  Temp: 97.8 °F (36.6 °C) (07/20/18 1130)  Pulse: 71 (07/20/18 1130)  Resp: 17 (07/20/18 1130)  BP: (!) 99/49 (07/20/18 1130)  SpO2: 99 % (07/20/18 1130) Vital Signs (24h Range):  Temp:  [93 °F (33.9 °C)-98.7 °F (37.1 °C)] 97.8 °F (36.6 °C)  Pulse:  [71-92] 71  Resp:  [16-18] 17  SpO2:  [96 %-99 %] 99 %  BP: ()/(49-58) 99/49     Weight: 126.3 kg (278 lb 7.1 oz)  Body mass index is 35.75 kg/m².    Intake/Output - Last 3 Shifts       07/18 0700 - 07/19 0659 07/19 0700 - 07/20 0659 07/20 0700 - 07/21 0659    P.O. 720 1320 780    I.V. (mL/kg) 0 (0) 20 (0.2)     Blood 225      Other 0 0     Total Intake(mL/kg) 945 (7.5) 1340 (10.6) 780 (6.2)    Urine (mL/kg/hr) 550 (0.2) 410 (0.1)     Emesis/NG output 0 (0) 850 (0.3)     Other 0 (0) 0 (0)     Stool 0 (0) 0 (0)     Blood 0 (0) 0 (0)     Total Output 550 1260      Net +395 +80 +780           Urine Occurrence 0 x 0 x     Stool Occurrence 6 x 5 x     Emesis Occurrence 1 x 0 x           Physical Exam   Constitutional: He appears well-developed and well-nourished. He appears lethargic. He is cooperative.   HENT:   Head: Normocephalic and atraumatic.   Mouth/Throat: No oropharyngeal exudate.   Eyes: EOM are normal. Pupils are equal, round, and  reactive to light. Scleral icterus is present.   Neck: No JVD present.   Cardiovascular: Normal rate, regular rhythm and intact distal pulses.  Exam reveals no gallop and no friction rub.    Murmur heard.  Pulmonary/Chest: Effort normal. No stridor. No respiratory distress. He has decreased breath sounds in the right lower field and the left lower field. He has no wheezes. He has no rales.   Abdominal: Soft. Bowel sounds are normal. He exhibits no distension and no mass.   Musculoskeletal: He exhibits edema (+3 generalized edema ). He exhibits no tenderness.   Lymphadenopathy:     He has no cervical adenopathy.   Neurological: He appears lethargic.   + asterixis and baseline tremor    Skin: Skin is warm and dry. No rash noted. No erythema.   BLE with chronic venous stasis changes, crusty/flaky skin intact without wounds   Psychiatric: He has a normal mood and affect. His speech is delayed. He is slowed.   Nursing note and vitals reviewed.      Laboratory:  Immunosuppressants     None        CBC:     Recent Labs  Lab 07/20/18  0639   WBC 3.50*   RBC 2.39*   HGB 8.2*   HCT 24.9*   PLT 31*   *   MCH 34.3*   MCHC 32.9     CMP:     Recent Labs  Lab 07/20/18  0639     107   CALCIUM 9.2  9.3   ALBUMIN 2.3*   PROT 4.9*     145   K 4.4  4.6   CO2 15*  17*   *  120*   BUN 64*  63*   CREATININE 3.2*  3.3*   ALKPHOS 93   ALT 38   AST 63*   BILITOT 1.8*     Coagulation:     Recent Labs  Lab 07/20/18  0639   INR 1.6*     Labs within the past 24 hours have been reviewed.    Diagnostic Results:  None    Assessment/Plan:     * Acute kidney injury superimposed on chronic kidney disease    -With CKD3   -VICKY initially improving with albumin infusion but with minimal intake 6/25 and 6/26.  - nephrology consulted due to hypervolemia and multiple electrolyte abnormalities (acidosis, hypernatremia)  - Creatinine continues to worsen, likely to need RRT in the near future if doesn't improve. Discussed with  "Nephrology. Plan for lasix trial per Nephrology. Give IV Lasix 80 mg. Monitor.         Hypothermia    - with episodes of hypothermia on 7/17 and 7/18.   - BS antibxs on board for prophylaxis.   - blood cultures NGTD.   - ID consulted for further management.           CKD (chronic kidney disease), stage III    See VICKY. KTM consulted to assess need for kidney transplant.             Severe protein-calorie malnutrition    - Dietary consulted for calorie count.  - Pt eating better accord to family.  Does take boost supplement.  - "pre-hab" regimen and supplements ordered. Dc creon due to excessive diarrhea  - Patient drinking at least 2-3 boost supplements and beneprotein daily.   - If continues to have low intake, consider starting Megace.           Physical deconditioning    - PT/OT consulted.    - Encourage OOBTC for several hours each day, walking with walker with PT        Ascites due to alcoholic cirrhosis    - Para 6/25 negative for infection   - Para 7/10 negative for infection. Monitor.           Coagulopathy    - Secondary to decompensated liver disease.         Cholestatic pruritus    - cholestyramine has previously been ineffective, given depressed mood.   - Zoloft for mood and pruritus.        Acidosis    Continue oral bicarb replacement. Monitor with daily labs.           Hypoalbuminemia due to protein-calorie malnutrition    - see severe protein-calorie malnutrition         Pancytopenia    - related to decompensated liver disease         Decompensated hepatic cirrhosis    - Placed status 7 on 6/26 for prescription drug coverage change. Re-activated 7/3. Back on hold d/t frailty and decompensation at the end of last week prompting ICU transfer. Now off hold 7/17.  Plan to submit MELD exception points. Will remeld today at 25.   - dc propanolol    MELD-Na score: 25 at 7/20/2018  6:39 AM  MELD score: 25 at 7/20/2018  6:39 AM  Calculated from:  Serum Creatinine: 3.2 mg/dL at 7/20/2018  6:39 AM  Serum Sodium: " 143 mmol/L (Rounded to 137) at 7/20/2018  6:39 AM  Total Bilirubin: 1.8 mg/dL at 7/20/2018  6:39 AM  INR(ratio): 1.6 at 7/20/2018  6:39 AM  Age: 63 years        Coronary artery disease involving native coronary artery of native heart without angina pectoris    - Continue home ASA.   - 2D Echo 7/9 normal EF (55-60%), trivial tricuspid/mitral regurg.        Anemia of chronic disease    - H&H decreased on 7/18 and one unit of PRBCs given for replacement at that time.   - With good response to transfusion.   - H/H stable. Continue to monitor.          Thrombocytopenia    - Secondary to splenomegaly from portal HTN- should improve with txp.  - No s/s overt bleed.        Seizures    - Oral Keppra transitioned to IV as pt not able to swallow 6/26.  - Resumed oral Keppra since mentation improved.  - EEG 6/26 negative.        Bilateral edema of lower extremity    - Completed vanc x 7 days for BLE cellulitis  - 2D Echo 7/9 normal EF (55-60%), trivial tricuspid/mitral regurg.  - Vanc now restarted given recent episodes of hypothermia.   -Start trial of lasix per Nephrology.         Hepatic encephalopathy    - ESLD secondary to EtOH cirrhosis with severe complications (hyperbilirubinemia, hypoalbuminemia, severe malnutrition, hepatic encephalopathy, thrombocytopenia, splenomegaly, ascites, hypervolemia, coaguloopathy, portal HTN), seizure disorder on Keppra, CKD3, CAD   - has frequent episodes of lethargy/confusion/slowing when holding lactulose   - admitted to SICU 7/13/18 for worsening encephalopathy and hypothermia  - PO lactulose TID, PRN lactulose enemas, Rifaximin  - sleepy this AM. Given lactulose enema with improvement in mentation. Continue oral lactulose and PRN lactulose enemas for HE control.               VTE Risk Mitigation         Ordered     heparin (porcine) injection 5,000 Units  Every 8 hours      06/22/18 1502     IP VTE HIGH RISK PATIENT  Once      06/22/18 1502          The patients clinical status was  discussed at multidisplinary rounds, involving transplant surgery, transplant medicine, pharmacy, nursing, nutrition, and social work    Discharge Planning: Not a candidate for discharge at this time.   No Patient Care Coordination Note on file.      Jyothi Cherry PA-C  Liver Transplant  [facility]

## 2018-07-20 NOTE — PLAN OF CARE
Problem: Patient Care Overview  Goal: Plan of Care Review  Outcome: Ongoing (interventions implemented as appropriate)  * AAO x 4 delayed responses noted.   * Abdomen distended and soft.  * Bowel sound noted as hyperactive. Patient is incontinent of stool. Two bowel movements thus far black stool noted  * 2 gm Na mechanical soft diet patient tolerating well. See chart for % eaten.   * Generalized ecchymosis noted   * Edema noted to the bilateral lower extremities +4 pitting. Generalized edema noted. * +3 edema noted to the scrotum.  * Bed at lowest position and locked, call light within reach, non-slip socks on, mother at bedside, and side rails up x 2.  Encouraged patient to call for assistance when needed patient and mother stated understanding. This RN sitting outside of room with blinds open to visualize patient.  * Left upper arm midline (7/16/18) patent, dressing clean dry and intact no signs or symptoms of infection noted. Dressing due to be changed on 7/23/18, site to be changed on 8/14/18.  * Left upper arm PIV 22 G (7/16/18) patent, dressing clean dry and intact no signs or symptoms of infection noted.  * Jaundice noted the skin & sclera.  * Patient has complaints of nausea/vomiting PRN medication administered full relief noted.  * Oxygen saturation 98 % on room air.  Respirations even and unlabored no signs or symptoms of distress noted.  * Patient has no complaints of pain at this time.  * Dermatitis noted to the buttock barrier applied.   * Standard precautions maintained.  * Turning every two hours see flow sheet for specific documentation.   * [place] catheter (7/13/18) patent and intact secured to leg with securment device. Cloudy lynne urine noted see flow sheet for intake and output totals.  * BMP every 8 hours see chart for results (DNP notified).

## 2018-07-20 NOTE — ASSESSMENT & PLAN NOTE
- Placed status 7 on 6/26 for prescription drug coverage change. Re-activated 7/3. Back on hold d/t frailty and decompensation at the end of last week prompting ICU transfer. Now off hold 7/17.  Plan to submit MELD exception points. Will remeld today at 25.   - dc propanolol    MELD-Na score: 25 at 7/20/2018  6:39 AM  MELD score: 25 at 7/20/2018  6:39 AM  Calculated from:  Serum Creatinine: 3.2 mg/dL at 7/20/2018  6:39 AM  Serum Sodium: 143 mmol/L (Rounded to 137) at 7/20/2018  6:39 AM  Total Bilirubin: 1.8 mg/dL at 7/20/2018  6:39 AM  INR(ratio): 1.6 at 7/20/2018  6:39 AM  Age: 63 years

## 2018-07-20 NOTE — ASSESSMENT & PLAN NOTE
Possibly pre-renal and maybe related to paracentesis, but cannot r/o HRS although ess likey w/ hemodynamic stability

## 2018-07-20 NOTE — CONSULTS
[facility]  Kidney Transplant  Consult Note    Inpatient consult to Kidney/Pancreas Transplant Medicine  Consult performed by: JAYY NORRIS  Consult ordered by: UNRULY OSUNA  Reason for consult: SLK candidate?            Subjective:     History of Present Illness:   62yo AAM with hx of ESLD secondary to EtOH cirrhosis with severe complications (hyperbilirubinemia, hypoalbuminemia, severe malnutrition, hepatic encephalopathy, thrombocytopenia, splenomegaly, ascites, hypervolemia, coaguloopathy, portal HTN), seizure disorder on Keppra, CKD3, CAD.     Patient was placed on transplant list on 6/1/2018.  He has a recent hx of transfer admission on 6/9 for severe hepatic encephalopathy requiring intubation which resolved.     Patient was admitted on 6/22/2018 from clinic for significant hypervolemia, weeping from BLE, and VICKY on CKD3 on routine labs.  His MELD increased from 22 to 26 per labs- coordinator notified and MELD updated.  Cr elevated to 2.3 from baseline of 1.4.  Also with worsening ascites. Pt reported increased pruritis, generalized fatigue and weakness.  He presented in wheelchair.  ROS otherwise negative.      KTM service consulted to assess suitability for combined liver/kidney transplant.    Past Medical and Surgical History: Mr. Adamson has a past medical history of Anticoagulant long-term use; Arthritis; Back pain; Cellulitis; Cirrhosis; Coronary artery disease; DM (diabetes mellitus); Hearing loss; Hepatic encephalopathy; Hypertension; Leg edema; Nephrolithiasis; JACK (obstructive sleep apnea); Seizures; and Splenomegaly.  He has a past surgical history that includes Cholecystectomy; Appendectomy; Tonsillectomy; Lumbar discectomy; Splenic artery embolization (04/08/2016); Back surgery; and Colonoscopy (N/A, 1/25/2018).    Past Social and Family History: Mr. Adamson reports that he has never smoked. His smokeless tobacco use includes Chew. He reports that he does not drink  "alcohol or use drugs.His family history is not on file.    Intake/Output - Last 3 Shifts       07/18 0700 - 07/19 0659 07/19 0700 - 07/20 0659 07/20 0700 - 07/21 0659    P.O. 720 1320 780    I.V. (mL/kg) 0 (0) 20 (0.2)     Blood 225      Other 0 0     Total Intake(mL/kg) 945 (7.5) 1340 (10.6) 780 (6.2)    Urine (mL/kg/hr) 550 (0.2) 410 (0.1)     Emesis/NG output 0 (0) 850 (0.3)     Other 0 (0) 0 (0)     Stool 0 (0) 0 (0)     Blood 0 (0) 0 (0)     Total Output 550 1260      Net +395 +80 +780           Urine Occurrence 0 x 0 x     Stool Occurrence 6 x 5 x     Emesis Occurrence 1 x 0 x            Review of Systems   Unable to perform ROS: Mental status change     Objective:     Vital Signs (Most Recent):  Temp: (P) 98.2 °F (36.8 °C) (Bear hugger removed) (07/20/18 0915)  Pulse: 82 (07/20/18 0729)  Resp: 17 (07/20/18 0729)  BP: (!) 115/53 (07/20/18 0729)  SpO2: 97 % (07/20/18 0729) Vital Signs (24h Range):  Temp:  [93 °F (33.9 °C)-98.7 °F (37.1 °C)] (P) 98.2 °F (36.8 °C)  Pulse:  [71-92] 82  Resp:  [16-18] 17  SpO2:  [96 %-99 %] 97 %  BP: (108-123)/(53-58) 115/53     Weight: 126.3 kg (278 lb 7.1 oz)  Height: 6' 2" (188 cm)  Body mass index is 35.75 kg/m².    Physical Exam   Constitutional: He is oriented to person, place, and time. He appears well-developed.   HENT:   Head: Normocephalic and atraumatic.   Eyes: EOM are normal.   Neck: No JVD present.   Cardiovascular: Normal rate and regular rhythm.  Exam reveals no friction rub.    Pulmonary/Chest: Effort normal and breath sounds normal.   Abdominal: Soft. He exhibits no distension.   Musculoskeletal: He exhibits no edema.   Neurological: He is alert and oriented to person, place, and time.   Skin: Skin is warm.   Psychiatric: He has a normal mood and affect.       Assessment/Plan:     * Acute kidney injury superimposed on chronic kidney disease    Possibly pre-renal and maybe related to paracentesis, but cannot r/o HRS although ess likey w/ hemodynamic stability     " "     Organ transplant candidate    Patient with eGFR < 60 for greater than three months and currently with a most recent eGFR of < 20, also ultrasound from 7/9 noted for changes consistent with "chronic medical renal diseases," in short, this patient is a candidate for combined liver kidney transplant          CKD (chronic kidney disease), stage III    Now w/ an acute component, defer to general nephrology            Elan Magana MD  Kidney Transplant  [facility]  "

## 2018-07-20 NOTE — NURSING
After taking evening medications patient has complaints of nausea. Notified AURELIA Regalado DNP order received for IV Zofran will enter and administer.

## 2018-07-20 NOTE — PT/OT/SLP PROGRESS
Physical Therapy Treatment    Patient Name:  Alon Adamson   MRN:  [id number]    Recommendations:     Discharge Recommendations:  nursing facility, skilled   Discharge Equipment Recommendations: none   Barriers to discharge: Inaccessible home and Decreased caregiver support    Assessment:     Alon Adamson is a 63 y.o. male admitted with a medical diagnosis of Acute kidney injury superimposed on chronic kidney disease.  He presents with the following impairments/functional limitations:  weakness, impaired endurance, impaired functional mobilty, gait instability, impaired balance, decreased upper extremity function, decreased lower extremity function, impaired cognition, edema, impaired skin, decreased coordination Patient participated well, able to stand w/ CGA w/ RW from elevated bed and take steps along EOB. He was able to stand w/ RW ~5 minutes for clean up. Good effort and continues to benefit from skilled PT.    Rehab Prognosis:  good; patient would benefit from acute skilled PT services to address these deficits and reach maximum level of function.      Recent Surgery: * No surgery found *      Plan:     During this hospitalization, patient to be seen 4 x/week to address the above listed problems via gait training, therapeutic activities, therapeutic exercises, neuromuscular re-education  · Plan of Care Expires:  08/14/18   Plan of Care Reviewed with: patient, family (patient mother w/ pt and pt ageeable to mother in room)    Subjective     Communicated with nurse prior to session.  Patient found supine w/ HOB elevated upon PT entry to room, agreeable to treatment.      Chief Complaint: fear of bowel movement s/p lactulose enema  Patient comments/goals: walk more  Pain/Comfort:  · Pain Rating 1: 0/10  · Pain Rating Post-Intervention 1: 0/10    Patients cultural, spiritual, Bahai conflicts given the current situation: none noted     Objective:     Patient found with: griffin catheter     General  Precautions: Standard, fall   Orthopedic Precautions:N/A   Braces: N/A     Functional Mobility:  · Bed Mobility:     · Supine to Sit: minimum assistance w/ HOB elevated. Assist for trunk elevation.  · Sit to Supine: moderate assistance for LE guidance  · Transfers:     · Sit to Stand:  contact guard assistance with rolling walker from elevated bed x2 trials, rocking for hip elevation  · Gait: side steps left <>right x2 trials along EOB. (unable to amb to chair d/t BM and cleanup, fatigue)  · Balance: stands w/ RW and CGA ~ 5 minutes at EOB for cleanup w/ RN and PT.       AM-PAC 6 CLICK MOBILITY  Turning over in bed (including adjusting bedclothes, sheets and blankets)?: 3  Sitting down on and standing up from a chair with arms (e.g., wheelchair, bedside commode, etc.): 3  Moving from lying on back to sitting on the side of the bed?: 3  Moving to and from a bed to a chair (including a wheelchair)?: 3  Need to walk in hospital room?: 3  Climbing 3-5 steps with a railing?: 1  Basic Mobility Total Score: 16       Therapeutic Activities and Exercises:   Quad sets,   Glute sets,   Ankle  Pumps,   hip abduction/adduction,  heelslides,   SAQ,   LAQ   x10-20 reps w/ assist as needed      Patient left HOB elevated with all lines intact, call button in reach and nruse present..    GOALS:    Physical Therapy Goals        Problem: Physical Therapy Goal    Goal Priority Disciplines Outcome Goal Variances Interventions   Physical Therapy Goal     PT/OT, PT Ongoing (interventions implemented as appropriate)     Description:  Goals to be met by: 18     Patient will increase functional independence with mobility by performin. Supine to sit with Stand-by Assistance.  2. Sit to stand with Supervision.  3. Gait x 200 ft with Supervision with RW.  4. Ascend/descend 5 stair with right Handrails with SBA.  5. Lower extremity exercise program x 20 reps, with supervision, in order to increase LE strength and (I) with functional  mobility.                               Time Tracking:     PT Received On: 07/20/18  PT Start Time: 1338     PT Stop Time: 1426  PT Total Time (min): 48 min     Billable Minutes: Gait Training 10, Therapeutic Activity 20 and Therapeutic Exercise 10    Treatment Type: Treatment  PT/PTA: PT     PTA Visit Number: 0     Kenyetta Mcclure, PT  07/20/2018

## 2018-07-20 NOTE — ASSESSMENT & PLAN NOTE
- ESLD secondary to EtOH cirrhosis with severe complications (hyperbilirubinemia, hypoalbuminemia, severe malnutrition, hepatic encephalopathy, thrombocytopenia, splenomegaly, ascites, hypervolemia, coaguloopathy, portal HTN), seizure disorder on Keppra, CKD3, CAD   - has frequent episodes of lethargy/confusion/slowing when holding lactulose   - admitted to SICU 7/13/18 for worsening encephalopathy and hypothermia  - PO lactulose TID, PRN lactulose enemas, Rifaximin  - sleepy this AM. Given lactulose enema with improvement in mentation. Continue oral lactulose and PRN lactulose enemas for HE control.

## 2018-07-20 NOTE — ASSESSMENT & PLAN NOTE
63M PM alcoholic cirrhosis, initially admitted for decompensated cirrhosis, seen by ID earlier during hospital admission for cellulitis and CONS in 2/4 blood cx bottles, now s/p 7 days of vancomycin. He has been persistently hypothermic to 93-94* requiring warming blanket. ID re-consulted for evaluation of hypothermia.    Recommendations:  - follow blood cx from 7/17, so far NGTD  - pt appears more lethargic and is difficult to arouse today, ?new abdominal pain  - consider CT A/P to evaluate for new intra-abdominal pathology, as well as paracentesis to r/o SBP  - continue eval for non-infectious causes of hypothermia, endo consult noted  - continue cefepime pending work up

## 2018-07-20 NOTE — SUBJECTIVE & OBJECTIVE
Interval HPI:   VS stable  Pt is very lethargic  Eating:   <25%  Nausea: No  Hypoglycemia and intervention: No  Fever: No--> pt has episodes of hypothermia  TPN and/or TF: No      PMH, PSH, FH, SH updated and reviewed     ROS:    Review of Systems   Constitutional: Positive for fatigue.   Eyes: Negative for visual disturbance.   Respiratory: Negative for shortness of breath.    Cardiovascular: Negative for chest pain.   Gastrointestinal: Negative for abdominal pain.   Genitourinary: Negative for urgency.   Musculoskeletal: Negative for arthralgias.   Skin: Negative for wound.   Neurological: Negative for headaches.   Hematological: Does not bruise/bleed easily.   Psychiatric/Behavioral: Negative for sleep disturbance.       PHYSICAL EXAMINATION:  Vitals:    07/20/18 0915   BP:    Pulse:    Resp:    Temp: (P) 98.2 °F (36.8 °C)     Body mass index is 35.75 kg/m².    Physical Exam   Constitutional: He is oriented to person, place, and time. He appears well-developed. No distress.   HENT:   Head: Normocephalic and atraumatic.   Right Ear: External ear normal.   Left Ear: External ear normal.   Nose: Nose normal.   Hearing grossly normal  Dentition grossly normal   Neck: No tracheal deviation present. No thyromegaly present.   Cardiovascular: Normal rate.    No murmur heard.  Pulmonary/Chest: Effort normal and breath sounds normal.   Abdominal: Soft. He exhibits distension. There is no tenderness.   Musculoskeletal: He exhibits edema.   No digital clubbing or extremity cyanosis   Neurological: He is oriented to person, place, and time. Coordination normal.   Pt is lethargic   Skin: No rash noted.   No subcutaneous nodules noted.   Psychiatric: He has a normal mood and affect. His behavior is normal.   Alert and oriented to person, place, and situation.   Nursing note and vitals reviewed.

## 2018-07-20 NOTE — ASSESSMENT & PLAN NOTE
- Completed vanc x 7 days for BLE cellulitis  - 2D Echo 7/9 normal EF (55-60%), trivial tricuspid/mitral regurg.  - Vanc now restarted given recent episodes of hypothermia.   -Start trial of lasix per Nephrology.

## 2018-07-20 NOTE — PLAN OF CARE
Problem: Patient Care Overview  Goal: Plan of Care Review  -Pt free from fall or injury so far this shift. Instructed to call if assistance needed, verbalized understanding.   -Pt more lethargic today. Lactulose enema given, pt had 3 large BM's since given. Pt more alert after enema given. PO Lactulose continued.   -Temp WNL throughout the day, most recent temp 93.3, bear hugger on pt, will reassess. Endocrine cx'd for recs.   -Creat ^ 3.2 today. K/PTX cx'd today, see EPIC for recs. Albumin X1 and 80 mg of IVP Lasix given. BMP's checked Q 8 hrs.   -Stat H&H drawn after blood streak emesis noted, result 8.5/27.1.  -BUE & BLE with erythema, derm cx'd, see EPIC for recs.   -Pt Denies pain or discomfort. Lidocaine patch held today, due to ^ lethargy.   -Pt's position adjusted Q 2 hrs, and barrier cream applied with each cleaning. Pt out of the bed with PT today.   -Afebrile. Hand hygiene reinforced. Cefepime continued. ID saw pt today, see recs. Daily labs monitored.

## 2018-07-20 NOTE — SUBJECTIVE & OBJECTIVE
Past Medical History:   Diagnosis Date    Anticoagulant long-term use     Arthritis     Back pain     Cellulitis     Cirrhosis     Coronary artery disease     DM (diabetes mellitus)     Hearing loss     right ear    Hepatic encephalopathy     Hypertension     diagnosed today (11/25/2015) and prescribed toprol    Leg edema     Nephrolithiasis     JACK (obstructive sleep apnea)     Seizures     Splenomegaly        Past Surgical History:   Procedure Laterality Date    APPENDECTOMY      Open    BACK SURGERY      CHOLECYSTECTOMY      laprascopic    COLONOSCOPY N/A 1/25/2018    Procedure: COLONOSCOPY;  Surgeon: Lashawn Myles MD;  Location: [address]);  Service: Endoscopy;  Laterality: N/A;    LUMBAR DISCECTOMY      SPLENIC ARTERY EMBOLIZATION  04/08/2016    Dr Taveras    TONSILLECTOMY       Family History     None        Social History Main Topics    Smoking status: Never Smoker    Smokeless tobacco: Current User     Types: Chew    Alcohol use No    Drug use: No    Sexual activity: Not on file       Review of patient's allergies indicates:   Allergen Reactions    Nsaids (non-steroidal anti-inflammatory drug)      D/t to liver disease.    Tylenol [acetaminophen]      D/t liver disease.    Penicillins Other (See Comments)     Tolerated pip-tazo in 8/2016 without issue  Since childhood was told not to take it       Medications:  Continuous Infusions:  Scheduled Meds:   albumin human 25%  25 g Intravenous Q8H    ascorbic acid (vitamin C)  1,000 mg Oral Daily    atorvastatin  40 mg Oral Daily    ceFEPime (MAXIPIME) IVPB  1 g Intravenous Q12H    docusate sodium  100 mg Oral Daily    ergocalciferol  50,000 Units Oral Q7 Days    fluconazole  200 mg Oral Daily    heparin (porcine)  5,000 Units Subcutaneous Q8H    lactulose  45 g Oral TID    levETIRAcetam  500 mg Oral BID    lidocaine  2 patch Transdermal Q24H    octreotide  100 mcg Subcutaneous Q8H    omega-3 acid ethyl esters   2 g Oral Daily with breakfast    pantoprazole  40 mg Oral BID    rifAXIMin  550 mg Oral BID    sertraline  50 mg Oral Daily    sodium bicarbonate  1,950 mg Oral TID    zinc sulfate  220 mg Oral BID     PRN Meds:sodium chloride, sodium chloride, acetaminophen, diphenhydrAMINE-zinc acetate 1-0.1%, lactulose, metoclopramide HCl, ondansetron, sodium chloride 0.9%    Review of Systems   Constitutional: Negative for fever, chills and night sweats.   Skin: Positive for itching.   Hematologic/Lymphatic: Bruises/bleeds easily.     Objective:     Vital Signs (Most Recent):  Temp: 97.8 °F (36.6 °C) (07/20/18 1130)  Pulse: 71 (07/20/18 1130)  Resp: 17 (07/20/18 1130)  BP: (!) 99/49 (07/20/18 1130)  SpO2: 99 % (07/20/18 1130) Vital Signs (24h Range):  Temp:  [93 °F (33.9 °C)-98.7 °F (37.1 °C)] 97.8 °F (36.6 °C)  Pulse:  [71-92] 71  Resp:  [16-18] 17  SpO2:  [96 %-99 %] 99 %  BP: ()/(49-58) 99/49     Weight: 126.3 kg (278 lb 7.1 oz)  Body mass index is 35.75 kg/m².    Physical Exam   Constitutional: He appears well-developed and well-nourished. No distress.   Neurological: He is alert and oriented to person, place, and time.   Psychiatric: He has a flat affect.   Skin:   Areas Examined (abnormalities noted in diagram):   Head / Face Inspection Performed  Chest / Axilla Inspection Performed  Abdomen Inspection Performed  Back Inspection Performed  RUE Inspected  LUE Inspection Performed  RLE Inspected  LLE Inspection Performed                       Significant Labs:   Blood Culture: No results for input(s): LABBLOO in the last 48 hours.  CBC:   Recent Labs  Lab 07/19/18  0514 07/20/18  0236 07/20/18  0639   WBC 3.50* 3.95 3.50*   HGB 8.2* 8.9* 8.2*   HCT 25.6* 26.7* 24.9*   PLT 30* 34* 31*     CMP:   Recent Labs  Lab 07/19/18  0514 07/19/18  1403 07/19/18  2200 07/20/18  0639    140 144 143  145   K 4.1 4.3 4.3 4.4  4.6   * 114* 117* 117*  120*   CO2 18* 17* 18* 15*  17*   * 152* 126* 110  107    BUN 58* 59* 61* 64*  63*   CREATININE 2.9* 2.9* 3.1* 3.2*  3.3*   CALCIUM 9.4 9.6 9.8 9.2  9.3   PROT 5.0*  --   --  4.9*   ALBUMIN 2.5*  --   --  2.3*   BILITOT 2.1*  --   --  1.8*   ALKPHOS 107  --   --  93   AST 62*  --   --  63*   ALT 39  --   --  38   ANIONGAP 7* 9 9 11  8   EGFRNONAA 22.2* 22.2* 20.5* 19.7*  19.0*     Coagulation:   Recent Labs  Lab 07/19/18  0514 07/20/18  0639   INR 1.6* 1.6*

## 2018-07-20 NOTE — CONSULTS
Midline placed in Cephalic vein, 18 G x 10 cm size.  Lo t# [id number]  Needle advanced using realtime u/s guidance.  Please remove or replace by 29 days.

## 2018-07-20 NOTE — ASSESSMENT & PLAN NOTE
Pt's current thyroid function is less likely to be a cause for hypothermia.   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   Would repeat TFTs on Monday.     AM Cortisol level is within normal limits, suggesting normal adrenal function. Thus it is less likely that adrenal insuffiencey would be causing hypothermia. However if pt becomes symptomatic requiring pressors, would consider adding hydrocortisone.

## 2018-07-20 NOTE — SUBJECTIVE & OBJECTIVE
Interval History: NAEON. Patient is lethargic but aroasable, AOx3. Intake for the past 24 h ~1340, output~1260 () which leads to a positive net of 80. Cr level increased from 2.9 (7/19) to 3.1, it's going to the wrong direction.  Patient had another episode of hypothermia, not sure about the etiology.  Primary team is deciding on moving forward with  liver and kidney transplant. It is favorable to mange volume overload beforehand. At this point patient is not on any duretics.  Patient has BM. Patient's mother reportes poor po intake partially due to the fact that patient has no teeth. Nutrition service recommended to encourage po intake of solid foods.        Review of patient's allergies indicates:   Allergen Reactions    Nsaids (non-steroidal anti-inflammatory drug)      D/t to liver disease.    Tylenol [acetaminophen]      D/t liver disease.    Penicillins Other (See Comments)     Tolerated pip-tazo in 8/2016 without issue  Since childhood was told not to take it     Current Facility-Administered Medications   Medication Frequency    0.9%  NaCl infusion (for blood administration) Q24H PRN    0.9%  NaCl infusion (for blood administration) Q24H PRN    acetaminophen tablet 650 mg Q8H PRN    ascorbic acid (vitamin C) tablet 1,000 mg Daily    aspirin EC tablet 81 mg Daily    atorvastatin tablet 40 mg Daily    ceFEPIme injection 1 g Q12H    diphenhydrAMINE-zinc acetate 1-0.1% cream TID PRN    docusate sodium capsule 100 mg Daily    ergocalciferol capsule 50,000 Units Q7 Days    fluconazole tablet 200 mg Daily    heparin (porcine) injection 5,000 Units Q8H    lactulose 10 gram/15 mL solution (enema) 200 g Q6H PRN    lactulose 20 gram/30 mL solution Soln 45 g TID    levETIRAcetam tablet 500 mg BID    lidocaine 5 % patch 2 patch Q24H    metoclopramide HCl injection 10 mg Q6H PRN    omega-3 acid ethyl esters capsule 2 g Daily with breakfast    ondansetron tablet 4 mg Q8H PRN    pantoprazole EC  tablet 40 mg BID    rifAXIMin tablet 550 mg BID    sertraline tablet 50 mg Daily    sodium bicarbonate tablet 1,950 mg TID    sodium chloride 0.9% flush 3 mL PRN    zinc sulfate capsule 220 mg BID       Objective:     Vital Signs (Most Recent):  Temp: 98.3 °F (36.8 °C) (07/20/18 0729)  Pulse: 82 (07/20/18 0729)  Resp: 17 (07/20/18 0729)  BP: (!) 115/53 (07/20/18 0729)  SpO2: 97 % (07/20/18 0729)  O2 Device (Oxygen Therapy): room air (07/20/18 0729) Vital Signs (24h Range):  Temp:  [93 °F (33.9 °C)-98.7 °F (37.1 °C)] 98.3 °F (36.8 °C)  Pulse:  [71-92] 82  Resp:  [16-18] 17  SpO2:  [96 %-99 %] 97 %  BP: (108-123)/(53-58) 115/53     Weight: 126.3 kg (278 lb 7.1 oz) (07/20/18 0700)  Body mass index is 35.75 kg/m².  Body surface area is 2.57 meters squared.    I/O last 3 completed shifts:  In: 2060 [P.O.:2040; I.V.:20]  Out: 1585 [Urine:735; Emesis/NG output:850]    Physical Exam   Constitutional: He is oriented to person, place, and time. He appears well-developed and well-nourished. No distress.   HENT:   Head: Normocephalic and atraumatic.   Eyes: Conjunctivae and EOM are normal. Pupils are equal, round, and reactive to light.   Neck: Neck supple. No JVD present.   Cardiovascular: Normal rate, regular rhythm and normal heart sounds.  Exam reveals no friction rub.    No murmur heard.  Pulmonary/Chest: Effort normal. No stridor. No respiratory distress. He has no wheezes. He has no rales. He exhibits no tenderness.   Breath sounds decreased   Abdominal: Soft. Bowel sounds are normal. He exhibits no distension. There is no tenderness.   Musculoskeletal: He exhibits edema. He exhibits no tenderness.   Neurological: He is alert and oriented to person, place, and time.   Skin: Skin is warm and dry. Capillary refill takes less than 2 seconds. He is not diaphoretic. There is erythema (scaling ).   Psychiatric: He has a normal mood and affect. His behavior is normal. Judgment and thought content normal.       Significant  Labs:  CBC:   Recent Labs  Lab 07/20/18  0639   WBC 3.50*   RBC 2.39*   HGB 8.2*   HCT 24.9*   PLT 31*   *   MCH 34.3*   MCHC 32.9     CMP:   Recent Labs  Lab 07/20/18  0639     107   CALCIUM 9.2  9.3   ALBUMIN 2.3*   PROT 4.9*     145   K 4.4  4.6   CO2 15*  17*   *  120*   BUN 64*  63*   CREATININE 3.2*  3.3*   ALKPHOS 93   ALT 38   AST 63*   BILITOT 1.8*

## 2018-07-20 NOTE — SUBJECTIVE & OBJECTIVE
Subjective:     History of Present Illness:   62yo AAM with hx of ESLD secondary to EtOH cirrhosis with severe complications (hyperbilirubinemia, hypoalbuminemia, severe malnutrition, hepatic encephalopathy, thrombocytopenia, splenomegaly, ascites, hypervolemia, coaguloopathy, portal HTN), seizure disorder on Keppra, CKD3, CAD.     Patient was placed on transplant list on 6/1/2018.  He has a recent hx of transfer admission on 6/9 for severe hepatic encephalopathy requiring intubation which resolved.     Patient was admitted on 6/22/2018 from clinic for significant hypervolemia, weeping from BLE, and VICKY on CKD3 on routine labs.  His MELD increased from 22 to 26 per labs- coordinator notified and MELD updated.  Cr elevated to 2.3 from baseline of 1.4.  Also with worsening ascites. Pt reported increased pruritis, generalized fatigue and weakness.  He presented in wheelchair.  ROS otherwise negative.      KTM service consulted to assess suitability for combined liver/kidney transplant.    Past Medical and Surgical History: Mr. Adamson has a past medical history of Anticoagulant long-term use; Arthritis; Back pain; Cellulitis; Cirrhosis; Coronary artery disease; DM (diabetes mellitus); Hearing loss; Hepatic encephalopathy; Hypertension; Leg edema; Nephrolithiasis; JACK (obstructive sleep apnea); Seizures; and Splenomegaly.  He has a past surgical history that includes Cholecystectomy; Appendectomy; Tonsillectomy; Lumbar discectomy; Splenic artery embolization (04/08/2016); Back surgery; and Colonoscopy (N/A, 1/25/2018).    Past Social and Family History: Mr. Adamson reports that he has never smoked. His smokeless tobacco use includes Chew. He reports that he does not drink alcohol or use drugs.His family history is not on file.    Intake/Output - Last 3 Shifts       07/18 0700 - 07/19 0659 07/19 0700 - 07/20 0659 07/20 0700 - 07/21 0659    P.O. 720 1320 780    I.V. (mL/kg) 0 (0) 20 (0.2)     Blood 225      Other 0 0      "Total Intake(mL/kg) 945 (7.5) 1340 (10.6) 780 (6.2)    Urine (mL/kg/hr) 550 (0.2) 410 (0.1)     Emesis/NG output 0 (0) 850 (0.3)     Other 0 (0) 0 (0)     Stool 0 (0) 0 (0)     Blood 0 (0) 0 (0)     Total Output 550 1260      Net +395 +80 +780           Urine Occurrence 0 x 0 x     Stool Occurrence 6 x 5 x     Emesis Occurrence 1 x 0 x            Review of Systems   Unable to perform ROS: Mental status change     Objective:     Vital Signs (Most Recent):  Temp: (P) 98.2 °F (36.8 °C) (Bear hugger removed) (07/20/18 0915)  Pulse: 82 (07/20/18 0729)  Resp: 17 (07/20/18 0729)  BP: (!) 115/53 (07/20/18 0729)  SpO2: 97 % (07/20/18 0729) Vital Signs (24h Range):  Temp:  [93 °F (33.9 °C)-98.7 °F (37.1 °C)] (P) 98.2 °F (36.8 °C)  Pulse:  [71-92] 82  Resp:  [16-18] 17  SpO2:  [96 %-99 %] 97 %  BP: (108-123)/(53-58) 115/53     Weight: 126.3 kg (278 lb 7.1 oz)  Height: 6' 2" (188 cm)  Body mass index is 35.75 kg/m².    Physical Exam   Constitutional: He is oriented to person, place, and time. He appears well-developed.   HENT:   Head: Normocephalic and atraumatic.   Eyes: EOM are normal.   Neck: No JVD present.   Cardiovascular: Normal rate and regular rhythm.  Exam reveals no friction rub.    Pulmonary/Chest: Effort normal and breath sounds normal.   Abdominal: Soft. He exhibits no distension.   Musculoskeletal: He exhibits no edema.   Neurological: He is alert and oriented to person, place, and time.   Skin: Skin is warm.   Psychiatric: He has a normal mood and affect.     "

## 2018-07-20 NOTE — SUBJECTIVE & OBJECTIVE
Interval History: pt remains hypothermic requiring warming blanket. This morning, very lethargic, barely wakes up to answer questions. Complains of diffuse abd pain    Review of Systems   Constitutional: Negative for fever.   Gastrointestinal: Positive for abdominal pain.   All other systems reviewed and are negative.    Objective:     Vital Signs (Most Recent):  Temp: (!) 93.3 °F (34.1 °C) (07/20/18 1605)  Pulse: 66 (07/20/18 1605)  Resp: 18 (07/20/18 1605)  BP: (!) 95/44 (07/20/18 1605)  SpO2: 99 % (07/20/18 1605) Vital Signs (24h Range):  Temp:  [93 °F (33.9 °C)-98.7 °F (37.1 °C)] 93.3 °F (34.1 °C)  Pulse:  [66-92] 66  Resp:  [16-18] 18  SpO2:  [97 %-99 %] 99 %  BP: ()/(44-58) 95/44     Weight: 126.3 kg (278 lb 7.1 oz)  Body mass index is 35.75 kg/m².    Estimated Creatinine Clearance: 33.8 mL/min (A) (based on SCr of 3.2 mg/dL (H)).    Physical Exam   Constitutional: He appears well-developed and well-nourished. No distress.   HENT:   Head: Atraumatic.   Right Ear: External ear normal.   Left Ear: External ear normal.   Nose: Nose normal.   Mouth/Throat: Oropharynx is clear and moist.   Eyes: EOM are normal.   Neck: Normal range of motion. Neck supple.   Cardiovascular: Normal rate, regular rhythm and normal heart sounds.    No murmur heard.  Pulmonary/Chest: Effort normal and breath sounds normal. No respiratory distress. He has no wheezes.   Abdominal: Soft. Bowel sounds are normal. He exhibits no distension. There is no tenderness.   Musculoskeletal: Normal range of motion. He exhibits no edema or deformity.   Neurological: No cranial nerve deficit.   Somnolent, arouses mostly w/ palpation to abdomen   Skin: Skin is dry. No rash noted. He is not diaphoretic.   Chronic skin changes b/l LE, no drainage   Psychiatric: He has a normal mood and affect. His behavior is normal.       Significant Labs:   Bilirubin:   Recent Labs  Lab 07/17/18  1645 07/18/18  0430 07/18/18  1456 07/19/18  0514 07/20/18  0639    BILITOT 2.3* 2.1* 2.3* 2.1* 1.8*     Blood Culture:   Recent Labs  Lab 07/09/18  1217 07/09/18  1218 07/12/18  0943 07/13/18  0822 07/17/18  1130   LABBLOO No growth after 5 days. No growth after 5 days. No growth after 5 days.  No growth after 5 days. No growth after 5 days.  No growth after 5 days. No Growth to date  No Growth to date  No Growth to date  No Growth to date     CBC:   Recent Labs  Lab 07/19/18  0514 07/20/18  0236 07/20/18  0639 07/20/18  1512   WBC 3.50* 3.95 3.50*  --    HGB 8.2* 8.9* 8.2* 8.5*   HCT 25.6* 26.7* 24.9* 27.1*   PLT 30* 34* 31*  --      CMP:   Recent Labs  Lab 07/19/18  0514 07/19/18  1403 07/19/18  2200 07/20/18  0639    140 144 143  145   K 4.1 4.3 4.3 4.4  4.6   * 114* 117* 117*  120*   CO2 18* 17* 18* 15*  17*   * 152* 126* 110  107   BUN 58* 59* 61* 64*  63*   CREATININE 2.9* 2.9* 3.1* 3.2*  3.3*   CALCIUM 9.4 9.6 9.8 9.2  9.3   PROT 5.0*  --   --  4.9*   ALBUMIN 2.5*  --   --  2.3*   BILITOT 2.1*  --   --  1.8*   ALKPHOS 107  --   --  93   AST 62*  --   --  63*   ALT 39  --   --  38   ANIONGAP 7* 9 9 11  8   EGFRNONAA 22.2* 22.2* 20.5* 19.7*  19.0*     Recent Lab Results       07/20/18  1512 07/20/18  0639 07/20/18  0236 07/19/18  2200 07/19/18  2159      Immature Granulocytes  0.6(H) 0.5       Immature Grans (Abs)  0.02  Comment:  Mild elevation in immature granulocytes is non specific and   can be seen in a variety of conditions including stress response,   acute inflammation, trauma and pregnancy. Correlation with other   laboratory and clinical findings is essential.   0.02  Comment:  Mild elevation in immature granulocytes is non specific and   can be seen in a variety of conditions including stress response,   acute inflammation, trauma and pregnancy. Correlation with other   laboratory and clinical findings is essential.         Albumin  2.3(L)        Alkaline Phosphatase  93        ALT  38        Anion Gap  11  9        8        Aniso   Slight Slight       AST  63(H)        Baso #  0.03 0.04       Basophil%  0.9 1.0       Total Bilirubin  1.8  Comment:  For infants and newborns, interpretation of results should be based  on gestational age, weight and in agreement with clinical  observations.  Premature Infant recommended reference ranges:  Up to 24 hours.............<8.0 mg/dL  Up to 48 hours............<12.0 mg/dL  3-5 days..................<15.0 mg/dL  6-29 days.................<15.0 mg/dL  (H)        BUN, Bld  64(H)  61(H)        63(H)        [place] Cells  Occasional Moderate       Calcium  9.2  9.8        9.3        Chloride  117(H)  117(H)        120(H)        CO2  15(L)  18(L)        17(L)        Cortisol  9.9  Comment:  Cortisol Reference Range:  Before 10:00 am: 4.46-22.7 ug/dL  After 5:00 pm:    1.7-14.1 ug/dL          Creatinine  3.2(H)  3.1(H)        3.3(H)        Differential Method  Automated Automated       eGFR if   22.8(A)  23.6(A)        21.9(A)        eGFR if non   19.7  Comment:  Calculation used to obtain the estimated glomerular filtration  rate (eGFR) is the CKD-EPI equation.   (A)  20.5  Comment:  Calculation used to obtain the estimated glomerular filtration  rate (eGFR) is the CKD-EPI equation.   (A)        19.0  Comment:  Calculation used to obtain the estimated glomerular filtration  rate (eGFR) is the CKD-EPI equation.   (A)        Eos #  0.3 0.4       Eosinophil%  9.1(H) 9.6(H)       Free T4  0.53(L)        Glucose  110  126(H)        107        Gran # (ANC)  1.5(L) 1.7(L)       Gran%  43.1 43.4       Hematocrit 27.1(L) 24.9(L) 26.7(L)       Hemoglobin 8.5(L) 8.2(L) 8.9(L)       Hypo  Occasional        Coumadin Monitoring INR  1.6  Comment:  Coumadin Therapy:  2.0 - 3.0 for INR for all indicators except mechanical heart valves  and antiphospholipid syndromes which should use 2.5 - 3.5.  (H)        Lymph #  0.9(L) 0.9(L)       Lymph%  26.0 23.5       Magnesium  3.1(H)        MCH  34.3(H)  34.2(H)       MCHC  32.9 33.3       MCV  104(H) 103(H)       Mono #  0.7 0.9       Mono%  20.3(H) 22.0(H)       MPV  14.7(H) SEE COMMENT  Comment:  Result not available.       nRBC  0 1(A)       Ovalocytes  Occasional Occasional       Phosphorus     4.9(H)     Platelet Estimate  Decreased(A) Decreased(A)       Platelets  31  Comment:  plt critical result(s) called and verbal readback obtained from   daniel kaplan rn, 07/20/2018 08:16  (LL) 34  Comment:  Platelet critical result(s) called and verbal readback obtained from   Franky Watson RN. , 07/20/2018 03:46  (LL)       Poik  Slight Moderate       Poly  Occasional        Potassium  4.4  4.3        4.6        Total Protein  4.9(L)        Protime  16.3(H)        RBC  2.39(L) 2.60(L)       RDW  20.7(H) 20.5(H)       Sodium  143  144        145        TSH  2.714        WBC  3.50(L) 3.95                       Significant Imaging: I have reviewed all pertinent imaging results/findings within the past 24 hours.

## 2018-07-20 NOTE — CONSULTS
[facility]  Endocrinology  Consult Note    Consult Requested by: Elan Guerra MD   Reason for admit: Acute kidney injury superimposed on chronic kidney disease    HISTORY OF PRESENT ILLNESS:  64 yo male with diagnosis of ESLD secondary to EtOH cirrhosis with severe complications (hyperbilirubinemia, hypoalbuminemia, severe malnutrition, hepatic encephalopathy, thrombocytopenia, splenomegaly, ascites, hypervolemia, coaguloopathy, portal HTN), seizure disorder on Keppra, CKD, CAD, is currently being treated for decompensated liver disease. Hospital course was complicated by hepatic encephalopathy. During the course, he has also had few episodes of hypothermia with temp as low as 93 F. He completed 7 day course of vancomycin for cellilitis, and is currently on vancomycin and cefepime for possible sepsis. However he continues to be hypothermic.   Further work-up of hypothermia was done, which showed TSH: 2.714, FT4: 0.53, AM cortisol: 9.9.    Medications and/or Treatments Impacting Glycemic Control:  Immunotherapy:    Immunosuppressants     None        Steroids:   Hormones     Start     Stop Route Frequency Ordered    07/20/18 1400  octreotide injection 100 mcg      -- SubQ Every 8 hours 07/20/18 0950        Pressors:    Autonomic Drugs     None          Prescriptions Prior to Admission   Medication Sig Dispense Refill Last Dose    ascorbic acid (VITAMIN C) 1000 MG tablet Take 1,000 mg by mouth once daily.   Taking    aspirin (ECOTRIN) 81 MG EC tablet Take 1 tablet (81 mg total) by mouth once daily. 90 tablet 3 Taking    atorvastatin (LIPITOR) 40 MG tablet TAKE 1 TABLET(40 MG) BY MOUTH EVERY DAY 30 tablet 0 Taking    docusate sodium (COLACE) 100 MG capsule Take 100 mg by mouth once daily.    Taking    ferrous gluconate 270 mg (27 mg iron) Tab Take 1 tablet by mouth once daily.   Taking    fish oil-omega-3 fatty acids 300-1,000 mg capsule Take 2 capsules (2 g total) by mouth once daily.  (Patient taking differently: Take 2 g by mouth 2 (two) times daily. )   Taking    furosemide (LASIX) 80 MG tablet Take 1 tablet (80 mg total) by mouth 2 (two) times daily. 60 tablet 2 Taking    GENERLAC 10 gram/15 mL solution Take 60 mLs (40 g total) by mouth 3 (three) times daily. 60mg three times per day (Patient taking differently: Take 60 mLs by mouth 3 (three) times daily. ) 5400 mL 6 Taking    levETIRAcetam (KEPPRA) 500 MG Tab Take 1 tablet (500 mg total) by mouth 2 (two) times daily. 180 tablet 1 Taking    magnesium oxide (MAG-OX) 400 mg tablet Take 1 tablet (400 mg total) by mouth once daily.  0 Taking    ondansetron (ZOFRAN) 4 MG tablet Take 1 tablet (4 mg total) by mouth every 8 (eight) hours as needed for Nausea. 15 tablet 3 Taking    pantoprazole (PROTONIX) 40 MG tablet Take 1 tablet (40 mg total) by mouth 2 (two) times daily before meals. 60 tablet 11 Taking    rifAXIMin (XIFAXAN) 550 mg Tab Take 1 tablet (550 mg total) by mouth 2 (two) times daily. 60 tablet 5 Taking    sodium bicarbonate 650 MG tablet Take 1 tablet (650 mg total) by mouth 2 (two) times daily. 60 tablet 11 Taking    spironolactone (ALDACTONE) 100 MG tablet Take 2 tablets (200 mg total) by mouth 2 (two) times daily. 60 tablet 2 Taking    tramadol (ULTRAM) 50 mg tablet Take 50 mg by mouth every 6 (six) hours as needed for Pain.   Taking       Current Facility-Administered Medications   Medication Dose Route Frequency Provider Last Rate Last Dose    0.9%  NaCl infusion (for blood administration)   Intravenous Q24H PRN Alexandrea Das PA-C        0.9%  NaCl infusion (for blood administration)   Intravenous Q24H PRN Jyothi Cherry PA-C        acetaminophen tablet 650 mg  650 mg Oral Q8H PRN Bebeto Virgen PA-C   650 mg at 06/24/18 0907    albumin human 25% bottle 25 g  25 g Intravenous Q8H Jyothi Cherry PA-C   25 g at 07/20/18 1141    ascorbic acid (vitamin C) tablet 1,000 mg  1,000 mg Oral Daily Terry Thompson  MD Daria   1,000 mg at 07/20/18 0833    atorvastatin tablet 40 mg  40 mg Oral Daily Terry Jay Huff MD   40 mg at 07/20/18 0833    ceFEPIme injection 1 g  1 g Intravenous Q12H Myrtle Sellers, NP   1 g at 07/20/18 0610    diphenhydrAMINE-zinc acetate 1-0.1% cream   Topical (Top) TID PRN Tammie Mathur MD        docusate sodium capsule 100 mg  100 mg Oral Daily Terry Jay Huff MD   100 mg at 07/20/18 0834    ergocalciferol capsule 50,000 Units  50,000 Units Oral Q7 Days Alexandrea Das PA-C   50,000 Units at 07/19/18 0945    fluconazole tablet 200 mg  200 mg Oral Daily Rudy Grimm MD   200 mg at 07/20/18 0834    heparin (porcine) injection 5,000 Units  5,000 Units Subcutaneous Q8H Bebeto Virgen PA-C   5,000 Units at 07/20/18 1445    lactulose 10 gram/15 mL solution (enema) 200 g  200 g Rectal Q6H PRN Rissa Fabricio, DNP   200 g at 07/20/18 1228    lactulose 20 gram/30 mL solution Soln 45 g  45 g Oral TID Rissa Regalado, DNP   45 g at 07/20/18 1455    levETIRAcetam tablet 500 mg  500 mg Oral BID Bebeto Virgen PA-C   500 mg at 07/20/18 0833    lidocaine 5 % patch 2 patch  2 patch Transdermal Q24H Jonathan Lopez NP   2 patch at 07/19/18 1231    metoclopramide HCl injection 10 mg  10 mg Intravenous Q6H PRN Myrtle Sellers, NP   10 mg at 07/17/18 0838    octreotide injection 100 mcg  100 mcg Subcutaneous Q8H Jyothi Cherry PA-C   100 mcg at 07/20/18 1445    omega-3 acid ethyl esters capsule 2 g  2 g Oral Daily with breakfast Alexandrea Das PA-C   2 g at 07/20/18 0845    ondansetron tablet 4 mg  4 mg Oral Q8H PRN Terrymarilyn Huff MD   4 mg at 07/20/18 1516    pantoprazole EC tablet 40 mg  40 mg Oral BID Bebeto Virgen PA-C   40 mg at 07/20/18 0833    rifAXIMin tablet 550 mg  550 mg Oral BID Terry Huff MD   550 mg at 07/20/18 0834    sertraline tablet 50 mg  50 mg Oral Daily Milvia Wen MD   50 mg at 07/20/18 0834    sodium bicarbonate tablet 1,950  mg  1,950 mg Oral TID Jyothi BHUMIKAJesse Cherry PA-C   1,950 mg at 07/20/18 1455    sodium chloride 0.9% flush 3 mL  3 mL Intravenous PRN Bebeto Virgen PA-C        zinc sulfate capsule 220 mg  220 mg Oral BID Jyothi Cherry PA-C   220 mg at 07/20/18 0833       Interval HPI:   VS stable  Pt is very lethargic  Eating:   <25%  Nausea: No  Hypoglycemia and intervention: No  Fever: No--> pt has episodes of hypothermia  TPN and/or TF: No      PMH, PSH, FH, SH updated and reviewed     ROS:    Review of Systems   Constitutional: Positive for fatigue.   Eyes: Negative for visual disturbance.   Respiratory: Negative for shortness of breath.    Cardiovascular: Negative for chest pain.   Gastrointestinal: Negative for abdominal pain.   Genitourinary: Negative for urgency.   Musculoskeletal: Negative for arthralgias.   Skin: Negative for wound.   Neurological: Negative for headaches.   Hematological: Does not bruise/bleed easily.   Psychiatric/Behavioral: Negative for sleep disturbance.       PHYSICAL EXAMINATION:  Vitals:    07/20/18 0915   BP:    Pulse:    Resp:    Temp: (P) 98.2 °F (36.8 °C)     Body mass index is 35.75 kg/m².    Physical Exam   Constitutional: He is oriented to person, place, and time. He appears well-developed. No distress.   HENT:   Head: Normocephalic and atraumatic.   Right Ear: External ear normal.   Left Ear: External ear normal.   Nose: Nose normal.   Hearing grossly normal  Dentition grossly normal   Neck: No tracheal deviation present. No thyromegaly present.   Cardiovascular: Normal rate.    No murmur heard.  Pulmonary/Chest: Effort normal and breath sounds normal.   Abdominal: Soft. He exhibits distension. There is no tenderness.   Musculoskeletal: He exhibits edema.   No digital clubbing or extremity cyanosis   Neurological: He is oriented to person, place, and time. Coordination normal.   Pt is lethargic   Skin: No rash noted.   No subcutaneous nodules noted.   Psychiatric: He has a normal  mood and affect. His behavior is normal.   Alert and oriented to person, place, and situation.   Nursing note and vitals reviewed.    .     ASSESSMENT and PLAN:    Hypothermia      Pt's current thyroid function is less likely to be a cause for hypothermia.   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   Would repeat TFTs on Monday.     AM Cortisol level is within normal limits, suggesting normal adrenal function. Thus it is less likely that adrenal insuffiencey would be causing hypothermia. However if pt becomes symptomatic requiring pressors, would consider adding hydrocortisone.         Hepatic encephalopathy    Management as per primary team              DISCHARGE NEEDS: will assess daily    Rubina Paul MD  Endocrinology  [facility]

## 2018-07-20 NOTE — ASSESSMENT & PLAN NOTE
- H&H decreased on 7/18 and one unit of PRBCs given for replacement at that time.   - With good response to transfusion.   - H/H stable. Continue to monitor.

## 2018-07-20 NOTE — NURSING
Notified AURELIA Regalado DNP of bowel movement black stool noted. Per DNP if next bowel movement is also black order STAT CBC. Will monitor.

## 2018-07-20 NOTE — HPI
64 yo M with ESLD 2/2 EtOH cirrhosis, CKD3, CAD and h/o seizures admitted for hypervolemia and VICKY on CKD 6/22/18. He has had a complicated hospital stay with episodes of HE and worsening kidney function. Pt is currently on the transplant wait list. Dermatology consulted for erythema of the BL upper and lower extremities with concern for cellulitis vs other skin infection. Of note, pt is a poor historian. He states his skin on his arms and legs has been like this 'for a long time.' Has had issues with recurrent swelling of the extremities due to his liver disease, which have worsened during his hospitalization. Pt reports that his legs are non-tender and non-pruritic, but scratches his arms and abdomen which is also very dry. Denies any open wounds. Denies fevers/chills, night sweats. Received a course of vanc from 6/26 to 7/1 earlier during his hospital stay for suspected LE cellulitis with some improvement, however now worsening again. Has been experiencing hypothermia though he has been euthermic the last 24 hours. He is currently on vanc/cefepime/fluconazole. BCx and UCx are NGTD.

## 2018-07-20 NOTE — NURSING
Notified Dr Choi of oral temp 94 and rectal temp=94.3 patient remains on heating pad and bear hugger. See flow sheet for settings. Patient remains AAOx4 with delayed responses.

## 2018-07-20 NOTE — PROGRESS NOTES
Unable to obtain oral temp, rectal temp 93.3. Bear hugger applied. Informed NAN Roe, says to leave Bear hugger on and to recheck temp in 1 hr.

## 2018-07-20 NOTE — TELEPHONE ENCOUNTER
PATIENT NAME: Alon Muller [facility] #: [id number]    Lab Results   Component Value Date    CREATININE 3.2 (H) 07/20/2018    CREATININE 3.3 (H) 07/20/2018     07/20/2018     07/20/2018    BILITOT 1.8 (H) 07/20/2018    ALBUMIN 2.3 (L) 07/20/2018    INR 1.6 (H) 07/20/2018   MELD 25    Encephalopathy: 1 - 2  Ascites: moderate  Dialysis: no     Recertification requestor: Yady Dietz

## 2018-07-20 NOTE — SUBJECTIVE & OBJECTIVE
Scheduled Meds:   albumin human 25%  25 g Intravenous Q8H    ascorbic acid (vitamin C)  1,000 mg Oral Daily    atorvastatin  40 mg Oral Daily    ceFEPime (MAXIPIME) IVPB  1 g Intravenous Q12H    docusate sodium  100 mg Oral Daily    ergocalciferol  50,000 Units Oral Q7 Days    fluconazole  200 mg Oral Daily    furosemide  80 mg Intravenous Once    heparin (porcine)  5,000 Units Subcutaneous Q8H    lactulose  45 g Oral TID    levETIRAcetam  500 mg Oral BID    lidocaine  2 patch Transdermal Q24H    octreotide  100 mcg Subcutaneous Q8H    omega-3 acid ethyl esters  2 g Oral Daily with breakfast    pantoprazole  40 mg Oral BID    rifAXIMin  550 mg Oral BID    sertraline  50 mg Oral Daily    sodium bicarbonate  1,950 mg Oral TID    zinc sulfate  220 mg Oral BID     Continuous Infusions:  PRN Meds:sodium chloride, sodium chloride, acetaminophen, diphenhydrAMINE-zinc acetate 1-0.1%, lactulose, metoclopramide HCl, ondansetron, sodium chloride 0.9%    Review of Systems   Constitutional: Positive for appetite change and fatigue. Negative for activity change, chills and fever.   HENT: Negative for mouth sores, sore throat and trouble swallowing.    Eyes: Negative for visual disturbance.   Respiratory: Positive for shortness of breath (upon activity). Negative for wheezing.    Cardiovascular: Positive for leg swelling. Negative for chest pain.   Gastrointestinal: Positive for diarrhea (lactulose). Negative for abdominal distention, abdominal pain, nausea and vomiting.   Endocrine: Negative for polydipsia and polyuria.   Genitourinary: Negative for decreased urine volume and difficulty urinating.   Musculoskeletal: Positive for arthralgias (both shoulders have been hurting for last 2 weeks, right > left, improved with lidocaine patches).   Skin: Negative for rash.   Neurological: Positive for weakness. Negative for dizziness, syncope and headaches.   Hematological: Bruises/bleeds easily.    Psychiatric/Behavioral: Positive for decreased concentration. Negative for agitation, confusion, hallucinations and sleep disturbance.     Objective:     Vital Signs (Most Recent):  Temp: 97.8 °F (36.6 °C) (07/20/18 1130)  Pulse: 71 (07/20/18 1130)  Resp: 17 (07/20/18 1130)  BP: (!) 99/49 (07/20/18 1130)  SpO2: 99 % (07/20/18 1130) Vital Signs (24h Range):  Temp:  [93 °F (33.9 °C)-98.7 °F (37.1 °C)] 97.8 °F (36.6 °C)  Pulse:  [71-92] 71  Resp:  [16-18] 17  SpO2:  [96 %-99 %] 99 %  BP: ()/(49-58) 99/49     Weight: 126.3 kg (278 lb 7.1 oz)  Body mass index is 35.75 kg/m².    Intake/Output - Last 3 Shifts       07/18 0700 - 07/19 0659 07/19 0700 - 07/20 0659 07/20 0700 - 07/21 0659    P.O. 720 1320 780    I.V. (mL/kg) 0 (0) 20 (0.2)     Blood 225      Other 0 0     Total Intake(mL/kg) 945 (7.5) 1340 (10.6) 780 (6.2)    Urine (mL/kg/hr) 550 (0.2) 410 (0.1)     Emesis/NG output 0 (0) 850 (0.3)     Other 0 (0) 0 (0)     Stool 0 (0) 0 (0)     Blood 0 (0) 0 (0)     Total Output 550 1260      Net +395 +80 +780           Urine Occurrence 0 x 0 x     Stool Occurrence 6 x 5 x     Emesis Occurrence 1 x 0 x           Physical Exam   Constitutional: He appears well-developed and well-nourished. He appears lethargic. He is cooperative.   HENT:   Head: Normocephalic and atraumatic.   Mouth/Throat: No oropharyngeal exudate.   Eyes: EOM are normal. Pupils are equal, round, and reactive to light. Scleral icterus is present.   Neck: No JVD present.   Cardiovascular: Normal rate, regular rhythm and intact distal pulses.  Exam reveals no gallop and no friction rub.    Murmur heard.  Pulmonary/Chest: Effort normal. No stridor. No respiratory distress. He has decreased breath sounds in the right lower field and the left lower field. He has no wheezes. He has no rales.   Abdominal: Soft. Bowel sounds are normal. He exhibits no distension and no mass.   Musculoskeletal: He exhibits edema (+3 generalized edema ). He exhibits no  tenderness.   Lymphadenopathy:     He has no cervical adenopathy.   Neurological: He appears lethargic.   + asterixis and baseline tremor    Skin: Skin is warm and dry. No rash noted. No erythema.   BLE with chronic venous stasis changes, crusty/flaky skin intact without wounds   Psychiatric: He has a normal mood and affect. His speech is delayed. He is slowed.   Nursing note and vitals reviewed.      Laboratory:  Immunosuppressants     None        CBC:     Recent Labs  Lab 07/20/18  0639   WBC 3.50*   RBC 2.39*   HGB 8.2*   HCT 24.9*   PLT 31*   *   MCH 34.3*   MCHC 32.9     CMP:     Recent Labs  Lab 07/20/18  0639     107   CALCIUM 9.2  9.3   ALBUMIN 2.3*   PROT 4.9*     145   K 4.4  4.6   CO2 15*  17*   *  120*   BUN 64*  63*   CREATININE 3.2*  3.3*   ALKPHOS 93   ALT 38   AST 63*   BILITOT 1.8*     Coagulation:     Recent Labs  Lab 07/20/18  0639   INR 1.6*     Labs within the past 24 hours have been reviewed.    Diagnostic Results:  None

## 2018-07-20 NOTE — PROGRESS NOTES
" [facility]  Adult Nutrition  Progress Note    SUMMARY       Recommendations    Recommendation/Intervention: Recommend cont on [facility] soft diet. encourage po intake of solid food and ONS. RD to follow  Goals: 1. Meet >85% EEN and EPN   Nutrition Goal Status: progressing towards goal  Communication of RD Recs:  (POC)    Reason for Assessment    Reason for Assessment: RD follow-up  Diagnosis:  (Acute kidney injury superimposed on chronic kidney disease )  Relevant Medical History: DM, Etoh abuse, Cirrhosis, HTN  Interdisciplinary Rounds: did not attend  General Information Comments: spoke w/ family. pt po intake is minimal, but is consuming Boost and Beneprotein at each meal. RN was not aware of kcal count , but will start with Lunch today. Per conversation w/ family poor po intake partially due to fact pt has no teeth. RD modified Diet Order to [facility] Soft to promote increased po intake.  Nutrition Discharge Planning: Adequate PO intake    Nutrition Risk Screen    Nutrition Risk Screen: no indicators present    Nutrition/Diet History    Patient Reported Diet/Restrictions/Preferences: low salt  Typical Food/Fluid Intake: decreased 2/2 not being able to chew (see gen. comments)  Food Preferences: No coffee, no tea, likes Jell-O  Do you have any cultural, spiritual, Shinto conflicts, given your current situation?: none noted   Food Allergies: NKFA  Factors Affecting Nutritional Intake: decreased appetite, nausea/vomiting    Anthropometrics    Temp: 98.2 °F (36.8 °C)  Height: 6' 2" (188 cm)  Height (inches): 74 in  Weight Method: Bed Scale  Weight: 126.3 kg (278 lb 7.1 oz)  Weight (lb): 278.44 lb  Ideal Body Weight (IBW), Male: 190 lb  % Ideal Body Weight, Male (lb): 146.55 lb  BMI (Calculated): 35.8  BMI Grade: 35 - 39.9 - obesity - grade II  Weight Loss: unintentional  Usual Body Weight (UBW), k.1 kg  % Usual Body Weight: 94.38  % Weight Change From Usual Weight: -5.82 %   "     Lab/Procedures/Meds    Pertinent Labs Reviewed: reviewed  Pertinent Labs Comments: GLU-126, BUN-61, Crt-3.1, GFR-20.5 Phos-4.9, Mg 3.1  Pertinent Medications Reviewed: reviewed  Pertinent Medications Comments: Vit-C, Statin, Heparin, Omega-3, Pantoprazole    Physical Findings/Assessment    Overall Physical Appearance: edematous, obese  Oral/Mouth Cavity: tooth/teeth missing  Skin: edema    Estimated/Assessed Needs    Weight Used For Calorie Calculations: 126.5 kg (278 lb 14.1 oz)  Energy Calorie Requirements (kcal): 2560  Energy Need Method: Leon-St Jeor (stress factor 1.2)  Protein Requirements: 130-147 gm/d (1.5-1.7 gm/kg IBW)  Weight Used For Protein Calculations: 86.2 kg (190 lb) (IBW)  Fluid Requirements (mL): 2560 (or per team)  Fluid Need Method: RDA Method  RDA Method (mL): 2560         Nutrition Prescription Ordered    Current Diet Order: low Na  Oral Nutrition Supplement: Boost GC    Evaluation of Received Nutrient/Fluid Intake    Oral Calories (kcal): 750  Oral Protein (gm): 42  Oral Fluid (mL): 711  I/O: +13.4 L since admit    Intake/Output Summary (Last 24 hours) at 07/20/18 0723  Last data filed at 07/20/18 0600   Gross per 24 hour   Intake             1340 ml   Output             1260 ml   Net               80 ml     Energy Calories Required: not meeting needs  Protein Required: not meeting needs  Fluid Required: not meeting needs  Comments: LBM: 7/20/18  % Intake of Estimated Energy Needs: 25 - 50 %  % Meal Intake: 25 - 50 %    Nutrition Risk    Level of Risk/Frequency of Follow-up: low     Assessment and Plan    Nutrition Problem  Inadequate oral intake    Related to (etiology):   Difficulty chewing/ decreased appetite    Signs and Symptoms (as evidenced by):   Pt consuming <50% of most meals, reliance on ONS    Nutrition Diagnosis Status:   New        Monitor and Evaluation    Food and Nutrient Intake: energy intake, food and beverage intake, enteral nutrition intake  Food and Nutrient  Administration: diet order, enteral and parenteral nutrition administration  Knowledge/Beliefs/Attitudes: food and nutrition knowledge/skill  Physical Activity and Function: nutrition-related ADLs and IADLs  Anthropometric Measurements: weight, weight change  Biochemical Data, Medical Tests and Procedures:  (All labs)  Nutrition-Focused Physical Findings: overall appearance, skin     Nutrition Follow-Up    RD Follow-up?: Yes

## 2018-07-20 NOTE — PROGRESS NOTES
[facility]  Nephrology  Progress Note    Patient Name: Alon Adamson  MRN: [id number]  Admission Date: 6/22/2018  Hospital Length of Stay: 28 days  Attending Provider: Elan Guerra MD   Primary Care Physician: Mckinley Vides MD  Principal Problem:Acute kidney injury superimposed on chronic kidney disease    Subjective:     HPI: Mr. Adamson is 63 yr old male with decompensated alcoholic cirrhosis, CKD III and CAD admitted here on 06/22/18 admitted for bilateral LE hypervolemia and VICKY on CKD III with cr of 2.3 baseline around 1.5. Patient has multiple hospitalization in the past secondary to decompensated liver failure. Patient is currently on transplant team. During current admission patient was getting lasix and albumin intermittently for VICKY however renal function was not getting better. Patient hospital course complicated by hepatic encephalopathy with some improvement of mentation with lactulose. Also treated for cellulitis with 7 days of vancomycin.Nephrology is consulted for worsening/not improving VICKY despite lasix/albumin.     Per chart review patient has no hypotensive episodes, BP mostly above 100's. Has not received nephrotoxins such as NSAIDs/contrast media. No sever sepsis/septic shock or acute blood loss during current admit. UA with 2+ leukocytes and Hyaline casts, no wbc/rbc cast or proteinuria. Urine Na+ < 20.    Patient was transferred to ICU on 7/13/2018 after being more lethargic and hypoactive.  After two days was stepped down back to [facility].       Interval History: NAEON. Patient is lethargic but aroasable, AOx3. Intake for the past 24 h ~1340, output~1260 () which leads to a positive net of 80. Cr level increased from 2.9 (7/19) to 3.1, it's going to the wrong direction.  Patient had another episode of hypothermia, not sure about the etiology.  Primary team is deciding on moving forward with  liver and kidney transplant. It is favorable to mange volume  overload beforehand. At this point patient is not on any duretics.  Patient has BM. Patient's mother reportes poor po intake partially due to the fact that patient has no teeth. Nutrition service recommended to encourage po intake of solid foods.        Review of patient's allergies indicates:   Allergen Reactions    Nsaids (non-steroidal anti-inflammatory drug)      D/t to liver disease.    Tylenol [acetaminophen]      D/t liver disease.    Penicillins Other (See Comments)     Tolerated pip-tazo in 8/2016 without issue  Since childhood was told not to take it     Current Facility-Administered Medications   Medication Frequency    0.9%  NaCl infusion (for blood administration) Q24H PRN    0.9%  NaCl infusion (for blood administration) Q24H PRN    acetaminophen tablet 650 mg Q8H PRN    ascorbic acid (vitamin C) tablet 1,000 mg Daily    aspirin EC tablet 81 mg Daily    atorvastatin tablet 40 mg Daily    ceFEPIme injection 1 g Q12H    diphenhydrAMINE-zinc acetate 1-0.1% cream TID PRN    docusate sodium capsule 100 mg Daily    ergocalciferol capsule 50,000 Units Q7 Days    fluconazole tablet 200 mg Daily    heparin (porcine) injection 5,000 Units Q8H    lactulose 10 gram/15 mL solution (enema) 200 g Q6H PRN    lactulose 20 gram/30 mL solution Soln 45 g TID    levETIRAcetam tablet 500 mg BID    lidocaine 5 % patch 2 patch Q24H    metoclopramide HCl injection 10 mg Q6H PRN    omega-3 acid ethyl esters capsule 2 g Daily with breakfast    ondansetron tablet 4 mg Q8H PRN    pantoprazole EC tablet 40 mg BID    rifAXIMin tablet 550 mg BID    sertraline tablet 50 mg Daily    sodium bicarbonate tablet 1,950 mg TID    sodium chloride 0.9% flush 3 mL PRN    zinc sulfate capsule 220 mg BID       Objective:     Vital Signs (Most Recent):  Temp: 98.3 °F (36.8 °C) (07/20/18 0729)  Pulse: 82 (07/20/18 0729)  Resp: 17 (07/20/18 0729)  BP: (!) 115/53 (07/20/18 0729)  SpO2: 97 % (07/20/18 0729)  O2 Device  (Oxygen Therapy): room air (07/20/18 0729) Vital Signs (24h Range):  Temp:  [93 °F (33.9 °C)-98.7 °F (37.1 °C)] 98.3 °F (36.8 °C)  Pulse:  [71-92] 82  Resp:  [16-18] 17  SpO2:  [96 %-99 %] 97 %  BP: (108-123)/(53-58) 115/53     Weight: 126.3 kg (278 lb 7.1 oz) (07/20/18 0700)  Body mass index is 35.75 kg/m².  Body surface area is 2.57 meters squared.    I/O last 3 completed shifts:  In: 2060 [P.O.:2040; I.V.:20]  Out: 1585 [Urine:735; Emesis/NG output:850]    Physical Exam   Constitutional: He is oriented to person, place, and time. He appears well-developed and well-nourished. No distress.   HENT:   Head: Normocephalic and atraumatic.   Eyes: Conjunctivae and EOM are normal. Pupils are equal, round, and reactive to light.   Neck: Neck supple. No JVD present.   Cardiovascular: Normal rate, regular rhythm and normal heart sounds.  Exam reveals no friction rub.    No murmur heard.  Pulmonary/Chest: Effort normal. No stridor. No respiratory distress. He has no wheezes. He has no rales. He exhibits no tenderness.   Breath sounds decreased   Abdominal: Soft. Bowel sounds are normal. He exhibits no distension. There is no tenderness.   Musculoskeletal: He exhibits edema. He exhibits no tenderness.   Neurological: He is alert and oriented to person, place, and time.   Skin: Skin is warm and dry. Capillary refill takes less than 2 seconds. He is not diaphoretic. There is erythema (scaling ).   Psychiatric: He has a normal mood and affect. His behavior is normal. Judgment and thought content normal.       Significant Labs:  CBC:   Recent Labs  Lab 07/20/18  0639   WBC 3.50*   RBC 2.39*   HGB 8.2*   HCT 24.9*   PLT 31*   *   MCH 34.3*   MCHC 32.9     CMP:   Recent Labs  Lab 07/20/18  0639     107   CALCIUM 9.2  9.3   ALBUMIN 2.3*   PROT 4.9*     145   K 4.4  4.6   CO2 15*  17*   *  120*   BUN 64*  63*   CREATININE 3.2*  3.3*   ALKPHOS 93   ALT 38   AST 63*   BILITOT 1.8*           Assessment/Plan:     * Acute kidney injury superimposed on chronic kidney disease    64 yo male with acute decompensated liver failure secondary to alcohol cirrhosis now with sCr 1.7, showing improvement.  Nephrology consulted for VICKY.  Etiology most likely pre-renal.  Also considered HRS but less likely given optimal BP and renal function labs.     -sCr at baseline 1.4-1.5, sCr stable at 1.7 this morning, UOP increasing and about 30ccs per hour this morning  -no need for RRT, continue to monitor serial RFPs and strict Is & Os  - Avoid nephrotoxic medications  - Maintain MAP >65  - Hb > 7gm/dL  - ABGs, U/A, Labs in AM                   Thank you for your consult. I will follow-up with patient. Please contact us if you have any additional questions.    Chichi Urbina MD  Nephrology  [facility]

## 2018-07-20 NOTE — ASSESSMENT & PLAN NOTE
"Patient with eGFR < 60 for greater than three months and currently with a most recent eGFR of < 20, also ultrasound from 7/9 noted for changes consistent with "chronic medical renal diseases," in short, this patient is a candidate for combined liver kidney transplant    "

## 2018-07-20 NOTE — CONSULTS
[facility]  Dermatology  Consult Note    Patient Name: Alon Adamson  MRN: [id number]  Admission Date: 6/22/2018  Hospital Length of Stay: 28 days  Attending Physician: Ealn Guerra MD  Primary Care Provider: Mckinley Vides MD     Inpatient consult to Dermatology  Consult performed by: FERNANDO SANTOS  Consult ordered by: UNRULY OSUNA        Subjective:     Principal Problem:Acute kidney injury superimposed on chronic kidney disease    HPI:  62 yo M with ESLD 2/2 EtOH cirrhosis, CKD3, CAD and h/o seizures admitted for hypervolemia and VICKY on CKD 6/22/18. He has had a complicated hospital stay with episodes of HE and worsening kidney function. Pt is currently on the transplant wait list. Dermatology consulted for erythema of the BL upper and lower extremities with concern for cellulitis vs other skin infection. Of note, pt is a poor historian. He states his skin on his arms and legs has been like this 'for a long time.' Has had issues with recurrent swelling of the extremities due to his liver disease, which have worsened during his hospitalization. Pt reports that his legs are non-tender and non-pruritic, but scratches his arms and abdomen which is also very dry. Denies any open wounds. Denies fevers/chills, night sweats. Received a course of vanc from 6/26 to 7/1 earlier during his hospital stay for suspected LE cellulitis with some improvement, however now worsening again. Has been experiencing hypothermia though he has been euthermic the last 24 hours. He is currently on vanc/cefepime/fluconazole. BCx and UCx are NGTD.     Past Medical History:   Diagnosis Date    Anticoagulant long-term use     Arthritis     Back pain     Cellulitis     Cirrhosis     Coronary artery disease     DM (diabetes mellitus)     Hearing loss     right ear    Hepatic encephalopathy     Hypertension     diagnosed today (11/25/2015) and prescribed toprol    Leg edema     Nephrolithiasis      JACK (obstructive sleep apnea)     Seizures     Splenomegaly        Past Surgical History:   Procedure Laterality Date    APPENDECTOMY      Open    BACK SURGERY      CHOLECYSTECTOMY      laprascopic    COLONOSCOPY N/A 1/25/2018    Procedure: COLONOSCOPY;  Surgeon: Lashawn Myles MD;  Location: [address]);  Service: Endoscopy;  Laterality: N/A;    LUMBAR DISCECTOMY      SPLENIC ARTERY EMBOLIZATION  04/08/2016    Dr Taveras    TONSILLECTOMY       Family History     None        Social History Main Topics    Smoking status: Never Smoker    Smokeless tobacco: Current User     Types: Chew    Alcohol use No    Drug use: No    Sexual activity: Not on file       Review of patient's allergies indicates:   Allergen Reactions    Nsaids (non-steroidal anti-inflammatory drug)      D/t to liver disease.    Tylenol [acetaminophen]      D/t liver disease.    Penicillins Other (See Comments)     Tolerated pip-tazo in 8/2016 without issue  Since childhood was told not to take it       Medications:  Continuous Infusions:  Scheduled Meds:   albumin human 25%  25 g Intravenous Q8H    ascorbic acid (vitamin C)  1,000 mg Oral Daily    atorvastatin  40 mg Oral Daily    ceFEPime (MAXIPIME) IVPB  1 g Intravenous Q12H    docusate sodium  100 mg Oral Daily    ergocalciferol  50,000 Units Oral Q7 Days    fluconazole  200 mg Oral Daily    heparin (porcine)  5,000 Units Subcutaneous Q8H    lactulose  45 g Oral TID    levETIRAcetam  500 mg Oral BID    lidocaine  2 patch Transdermal Q24H    octreotide  100 mcg Subcutaneous Q8H    omega-3 acid ethyl esters  2 g Oral Daily with breakfast    pantoprazole  40 mg Oral BID    rifAXIMin  550 mg Oral BID    sertraline  50 mg Oral Daily    sodium bicarbonate  1,950 mg Oral TID    zinc sulfate  220 mg Oral BID     PRN Meds:sodium chloride, sodium chloride, acetaminophen, diphenhydrAMINE-zinc acetate 1-0.1%, lactulose, metoclopramide HCl, ondansetron, sodium chloride  0.9%    Review of Systems   Constitutional: Negative for fever, chills and night sweats.   Skin: Positive for itching.   Hematologic/Lymphatic: Bruises/bleeds easily.     Objective:     Vital Signs (Most Recent):  Temp: 97.8 °F (36.6 °C) (07/20/18 1130)  Pulse: 71 (07/20/18 1130)  Resp: 17 (07/20/18 1130)  BP: (!) 99/49 (07/20/18 1130)  SpO2: 99 % (07/20/18 1130) Vital Signs (24h Range):  Temp:  [93 °F (33.9 °C)-98.7 °F (37.1 °C)] 97.8 °F (36.6 °C)  Pulse:  [71-92] 71  Resp:  [16-18] 17  SpO2:  [96 %-99 %] 99 %  BP: ()/(49-58) 99/49     Weight: 126.3 kg (278 lb 7.1 oz)  Body mass index is 35.75 kg/m².    Physical Exam   Constitutional: He appears well-developed and well-nourished. No distress.   Neurological: He is alert and oriented to person, place, and time.   Psychiatric: He has a flat affect.   Skin:   Areas Examined (abnormalities noted in diagram):   Head / Face Inspection Performed  Chest / Axilla Inspection Performed  Abdomen Inspection Performed  Back Inspection Performed  RUE Inspected  LUE Inspection Performed  RLE Inspected  LLE Inspection Performed                       Significant Labs:   Blood Culture: No results for input(s): LABBLOO in the last 48 hours.  CBC:   Recent Labs  Lab 07/19/18  0514 07/20/18  0236 07/20/18  0639   WBC 3.50* 3.95 3.50*   HGB 8.2* 8.9* 8.2*   HCT 25.6* 26.7* 24.9*   PLT 30* 34* 31*     CMP:   Recent Labs  Lab 07/19/18  0514 07/19/18  1403 07/19/18  2200 07/20/18  0639    140 144 143  145   K 4.1 4.3 4.3 4.4  4.6   * 114* 117* 117*  120*   CO2 18* 17* 18* 15*  17*   * 152* 126* 110  107   BUN 58* 59* 61* 64*  63*   CREATININE 2.9* 2.9* 3.1* 3.2*  3.3*   CALCIUM 9.4 9.6 9.8 9.2  9.3   PROT 5.0*  --   --  4.9*   ALBUMIN 2.5*  --   --  2.3*   BILITOT 2.1*  --   --  1.8*   ALKPHOS 107  --   --  93   AST 62*  --   --  63*   ALT 39  --   --  38   ANIONGAP 7* 9 9 11  8   EGFRNONAA 22.2* 22.2* 20.5* 19.7*  19.0*     Coagulation:   Recent Labs  Lab  07/19/18  0514 07/20/18  0639   INR 1.6* 1.6*         Assessment/Plan:     Chronic venous hypertension (idiopathic) with inflammation of bilateral lower extremity    62 yo M with ESLD 2/2 EtOH cirrhosis with worsening kidney function awaiting liver transplant, with erythema/edema of the extremities    - Clinical picture consistent with venous stasis dermatitis in a pt with a h/o hypervolemia and peripheral edema 2/2 ESLD and kidney impairment   - There are no s/s on exam that are concerning for primary or secondary infection at this time   - Recommend elevation of extremities, consider compression stockings to alleviate edema  - Recommend liberal use of bland emollient to entire body to decrease xerosis which may help to prevent possible fissuring and subsequent entry for infection   - Can also start TAC 0.1% ointment to lower extremities BID to reduce inflammation related to stasis dermatitis          Thank you for your consult. I will sign off. Please contact us if you have any additional questions.    Reyna Sun MD  Dermatology  [facility]

## 2018-07-20 NOTE — ASSESSMENT & PLAN NOTE
-With CKD3   -VICKY initially improving with albumin infusion but with minimal intake 6/25 and 6/26.  - nephrology consulted due to hypervolemia and multiple electrolyte abnormalities (acidosis, hypernatremia)  - Creatinine continues to worsen, likely to need RRT in the near future if doesn't improve. Discussed with Nephrology. Plan for lasix trial per Nephrology. Give IV Lasix 80 mg. Monitor.

## 2018-07-20 NOTE — PROGRESS NOTES
Attempted to give pt scheduled PO lactulose as ordered by NAN Roe, but pt unable to take, only took 30 grams. Pt vomited 500 cc of bilious/blood streaked emesis.  Informed NAN Roe, STAT H&H ordered.

## 2018-07-20 NOTE — PROGRESS NOTES
[facility]  Infectious Disease  Progress Note    Patient Name: Alon Adamson  MRN: [id number]  Admission Date: 6/22/2018  Length of Stay: 28 days  Attending Physician: Elan Guerra MD  Primary Care Provider: Mckinley Vides MD    Isolation Status: No active isolations  Assessment/Plan:      Hypothermia    63M PMHC alcoholic cirrhosis, initially admitted for decompensated cirrhosis, seen by ID earlier during hospital admission for cellulitis and CONS in 2/4 blood cx bottles, now s/p 7 days of vancomycin. He has been persistently hypothermic to 93-94* requiring warming blanket. ID re-consulted for evaluation of hypothermia.    Recommendations:  - follow blood cx from 7/17, so far NGTD  - pt appears more lethargic and is difficult to arouse today, ?new abdominal pain  - consider CT A/P to evaluate for new intra-abdominal pathology, as well as paracentesis to r/o SBP  - continue eval for non-infectious causes of hypothermia, endo consult noted  - continue cefepime pending work up            Anticipated Disposition: TBD    Thank you for your consult. I will follow-up with patient. Please contact us if you have any additional questions.      Jackelyn Hampton DO  Infectious Disease Fellow  P: [phone]      Subjective:     Principal Problem:Acute kidney injury superimposed on chronic kidney disease    HPI: 62 y/o M h/o ETOH cirrhosis (PSE, pHTN) listed for liver txp, CKD3, CAD recently admitted for worsening PSE  requiring intubation admitted from clinic with hypervolemia, VICKY and b/l LE erythema. Here he has been afebrile, with diagnostic paracentesis unremarkable, found to have CONS 2/4 blood culture bottles with repeat negative.  He has been on empiric vancomycin and ceftriaxone and b/l LE erythema has improved and overall (combined with other interventions) his mental status has improved.  He remains deconditioned.    7/19: reconsulted for hypothermia. Pt was treated for 7 days on vancomycin for  treatment of cellulitis, blood culture that was + for CONs felt to be contaminate. He has been persistently hypothermic, requiring warming blanket for the past day. Pt states that he otherwise feels well. He was started on vanc and cefepime for empiric treatment.  Interval History: pt remains hypothermic requiring warming blanket. This morning, very lethargic, barely wakes up to answer questions. Complains of diffuse abd pain    Review of Systems   Constitutional: Negative for fever.   Gastrointestinal: Positive for abdominal pain.   All other systems reviewed and are negative.    Objective:     Vital Signs (Most Recent):  Temp: (!) 93.3 °F (34.1 °C) (07/20/18 1605)  Pulse: 66 (07/20/18 1605)  Resp: 18 (07/20/18 1605)  BP: (!) 95/44 (07/20/18 1605)  SpO2: 99 % (07/20/18 1605) Vital Signs (24h Range):  Temp:  [93 °F (33.9 °C)-98.7 °F (37.1 °C)] 93.3 °F (34.1 °C)  Pulse:  [66-92] 66  Resp:  [16-18] 18  SpO2:  [97 %-99 %] 99 %  BP: ()/(44-58) 95/44     Weight: 126.3 kg (278 lb 7.1 oz)  Body mass index is 35.75 kg/m².    Estimated Creatinine Clearance: 33.8 mL/min (A) (based on SCr of 3.2 mg/dL (H)).    Physical Exam   Constitutional: He appears well-developed and well-nourished. No distress.   HENT:   Head: Atraumatic.   Right Ear: External ear normal.   Left Ear: External ear normal.   Nose: Nose normal.   Mouth/Throat: Oropharynx is clear and moist.   Eyes: EOM are normal.   Neck: Normal range of motion. Neck supple.   Cardiovascular: Normal rate, regular rhythm and normal heart sounds.    No murmur heard.  Pulmonary/Chest: Effort normal and breath sounds normal. No respiratory distress. He has no wheezes.   Abdominal: Soft. Bowel sounds are normal. He exhibits no distension. There is no tenderness.   Musculoskeletal: Normal range of motion. He exhibits no edema or deformity.   Neurological: No cranial nerve deficit.   Somnolent, arouses mostly w/ palpation to abdomen   Skin: Skin is dry. No rash noted. He is  not diaphoretic.   Chronic skin changes b/l LE, no drainage   Psychiatric: He has a normal mood and affect. His behavior is normal.       Significant Labs:   Bilirubin:   Recent Labs  Lab 07/17/18  1645 07/18/18  0430 07/18/18  1456 07/19/18  0514 07/20/18  0639   BILITOT 2.3* 2.1* 2.3* 2.1* 1.8*     Blood Culture:   Recent Labs  Lab 07/09/18  1217 07/09/18  1218 07/12/18  0943 07/13/18  0822 07/17/18  1130   LABBLOO No growth after 5 days. No growth after 5 days. No growth after 5 days.  No growth after 5 days. No growth after 5 days.  No growth after 5 days. No Growth to date  No Growth to date  No Growth to date  No Growth to date     CBC:   Recent Labs  Lab 07/19/18  0514 07/20/18  0236 07/20/18  0639 07/20/18  1512   WBC 3.50* 3.95 3.50*  --    HGB 8.2* 8.9* 8.2* 8.5*   HCT 25.6* 26.7* 24.9* 27.1*   PLT 30* 34* 31*  --      CMP:   Recent Labs  Lab 07/19/18  0514 07/19/18  1403 07/19/18  2200 07/20/18  0639    140 144 143  145   K 4.1 4.3 4.3 4.4  4.6   * 114* 117* 117*  120*   CO2 18* 17* 18* 15*  17*   * 152* 126* 110  107   BUN 58* 59* 61* 64*  63*   CREATININE 2.9* 2.9* 3.1* 3.2*  3.3*   CALCIUM 9.4 9.6 9.8 9.2  9.3   PROT 5.0*  --   --  4.9*   ALBUMIN 2.5*  --   --  2.3*   BILITOT 2.1*  --   --  1.8*   ALKPHOS 107  --   --  93   AST 62*  --   --  63*   ALT 39  --   --  38   ANIONGAP 7* 9 9 11  8   EGFRNONAA 22.2* 22.2* 20.5* 19.7*  19.0*     Recent Lab Results       07/20/18  1512 07/20/18  0639 07/20/18  0236 07/19/18  2200 07/19/18  2159      Immature Granulocytes  0.6(H) 0.5       Immature Grans (Abs)  0.02  Comment:  Mild elevation in immature granulocytes is non specific and   can be seen in a variety of conditions including stress response,   acute inflammation, trauma and pregnancy. Correlation with other   laboratory and clinical findings is essential.   0.02  Comment:  Mild elevation in immature granulocytes is non specific and   can be seen in a variety of  conditions including stress response,   acute inflammation, trauma and pregnancy. Correlation with other   laboratory and clinical findings is essential.         Albumin  2.3(L)        Alkaline Phosphatase  93        ALT  38        Anion Gap  11  9        8        Aniso  Slight Slight       AST  63(H)        Baso #  0.03 0.04       Basophil%  0.9 1.0       Total Bilirubin  1.8  Comment:  For infants and newborns, interpretation of results should be based  on gestational age, weight and in agreement with clinical  observations.  Premature Infant recommended reference ranges:  Up to 24 hours.............<8.0 mg/dL  Up to 48 hours............<12.0 mg/dL  3-5 days..................<15.0 mg/dL  6-29 days.................<15.0 mg/dL  (H)        BUN, Bld  64(H)  61(H)        63(H)        Reading Cells  Occasional Moderate       Calcium  9.2  9.8        9.3        Chloride  117(H)  117(H)        120(H)        CO2  15(L)  18(L)        17(L)        Cortisol  9.9  Comment:  Cortisol Reference Range:  Before 10:00 am: 4.46-22.7 ug/dL  After 5:00 pm:    1.7-14.1 ug/dL          Creatinine  3.2(H)  3.1(H)        3.3(H)        Differential Method  Automated Automated       eGFR if   22.8(A)  23.6(A)        21.9(A)        eGFR if non   19.7  Comment:  Calculation used to obtain the estimated glomerular filtration  rate (eGFR) is the CKD-EPI equation.   (A)  20.5  Comment:  Calculation used to obtain the estimated glomerular filtration  rate (eGFR) is the CKD-EPI equation.   (A)        19.0  Comment:  Calculation used to obtain the estimated glomerular filtration  rate (eGFR) is the CKD-EPI equation.   (A)        Eos #  0.3 0.4       Eosinophil%  9.1(H) 9.6(H)       Free T4  0.53(L)        Glucose  110  126(H)        107        Gran # (ANC)  1.5(L) 1.7(L)       Gran%  43.1 43.4       Hematocrit 27.1(L) 24.9(L) 26.7(L)       Hemoglobin 8.5(L) 8.2(L) 8.9(L)       Hypo  Occasional        Coumadin Monitoring  INR  1.6  Comment:  Coumadin Therapy:  2.0 - 3.0 for INR for all indicators except mechanical heart valves  and antiphospholipid syndromes which should use 2.5 - 3.5.  (H)        Lymph #  0.9(L) 0.9(L)       Lymph%  26.0 23.5       Magnesium  3.1(H)        MCH  34.3(H) 34.2(H)       MCHC  32.9 33.3       MCV  104(H) 103(H)       Mono #  0.7 0.9       Mono%  20.3(H) 22.0(H)       MPV  14.7(H) SEE COMMENT  Comment:  Result not available.       nRBC  0 1(A)       Ovalocytes  Occasional Occasional       Phosphorus     4.9(H)     Platelet Estimate  Decreased(A) Decreased(A)       Platelets  31  Comment:  plt critical result(s) called and verbal readback obtained from   daniel kaplan rn, 07/20/2018 08:16  (LL) 34  Comment:  Platelet critical result(s) called and verbal readback obtained from   Franky Watson RN. , 07/20/2018 03:46  (LL)       Poik  Slight Moderate       Poly  Occasional        Potassium  4.4  4.3        4.6        Total Protein  4.9(L)        Protime  16.3(H)        RBC  2.39(L) 2.60(L)       RDW  20.7(H) 20.5(H)       Sodium  143  144        145        TSH  2.714        WBC  3.50(L) 3.95                       Significant Imaging: I have reviewed all pertinent imaging results/findings within the past 24 hours.

## 2018-07-21 ENCOUNTER — TELEPHONE (OUTPATIENT)
Dept: TRANSPLANT | Facility: HOSPITAL | Age: 63
End: 2018-07-21

## 2018-07-21 PROBLEM — R11.2 NAUSEA AND VOMITING: Status: ACTIVE | Noted: 2018-07-21

## 2018-07-21 LAB
25(OH)D3+25(OH)D2 SERPL-MCNC: 7 NG/ML
ABO + RH BLD: NORMAL
ALBUMIN SERPL BCP-MCNC: 2.6 G/DL
ALP SERPL-CCNC: 86 U/L
ALT SERPL W/O P-5'-P-CCNC: 34 U/L
ANION GAP SERPL CALC-SCNC: 10 MMOL/L
ANION GAP SERPL CALC-SCNC: 11 MMOL/L
ANION GAP SERPL CALC-SCNC: 11 MMOL/L
ANISOCYTOSIS BLD QL SMEAR: SLIGHT
AST SERPL-CCNC: 57 U/L
BACTERIA #/AREA URNS AUTO: ABNORMAL /HPF
BASOPHILS # BLD AUTO: 0.01 K/UL
BASOPHILS NFR BLD: 0.4 %
BILIRUB SERPL-MCNC: 1.6 MG/DL
BILIRUB UR QL STRIP: NEGATIVE
BLD GP AB SCN CELLS X3 SERPL QL: NORMAL
BLD PROD TYP BPU: NORMAL
BLOOD UNIT EXPIRATION DATE: NORMAL
BLOOD UNIT TYPE CODE: 6200
BLOOD UNIT TYPE: NORMAL
BUN SERPL-MCNC: 69 MG/DL
BUN SERPL-MCNC: 70 MG/DL
BUN SERPL-MCNC: 73 MG/DL
CALCIUM SERPL-MCNC: 10 MG/DL
CALCIUM SERPL-MCNC: 9.4 MG/DL
CALCIUM SERPL-MCNC: 9.7 MG/DL
CHLORIDE SERPL-SCNC: 118 MMOL/L
CHLORIDE SERPL-SCNC: 119 MMOL/L
CHLORIDE SERPL-SCNC: 120 MMOL/L
CLARITY UR REFRACT.AUTO: ABNORMAL
CO2 SERPL-SCNC: 17 MMOL/L
CODING SYSTEM: NORMAL
COLOR UR AUTO: ABNORMAL
CREAT SERPL-MCNC: 3.7 MG/DL
CREAT SERPL-MCNC: 4.1 MG/DL
CREAT SERPL-MCNC: 4.1 MG/DL
CREAT UR-MCNC: 83 MG/DL
DIFFERENTIAL METHOD: ABNORMAL
DISPENSE STATUS: NORMAL
EOSINOPHIL # BLD AUTO: 0.3 K/UL
EOSINOPHIL NFR BLD: 9.2 %
ERYTHROCYTE [DISTWIDTH] IN BLOOD BY AUTOMATED COUNT: 21.1 %
EST. GFR  (AFRICAN AMERICAN): 16.9 ML/MIN/1.73 M^2
EST. GFR  (AFRICAN AMERICAN): 16.9 ML/MIN/1.73 M^2
EST. GFR  (AFRICAN AMERICAN): 19.1 ML/MIN/1.73 M^2
EST. GFR  (NON AFRICAN AMERICAN): 14.6 ML/MIN/1.73 M^2
EST. GFR  (NON AFRICAN AMERICAN): 14.6 ML/MIN/1.73 M^2
EST. GFR  (NON AFRICAN AMERICAN): 16.5 ML/MIN/1.73 M^2
GLUCOSE SERPL-MCNC: 156 MG/DL
GLUCOSE SERPL-MCNC: 189 MG/DL
GLUCOSE SERPL-MCNC: 212 MG/DL
GLUCOSE UR QL STRIP: NEGATIVE
HCT VFR BLD AUTO: 22 %
HCT VFR BLD AUTO: 22.8 %
HGB BLD-MCNC: 7.2 G/DL
HGB BLD-MCNC: 7.3 G/DL
HGB UR QL STRIP: ABNORMAL
HYALINE CASTS UR QL AUTO: 0 /LPF
HYPOCHROMIA BLD QL SMEAR: ABNORMAL
IMM GRANULOCYTES # BLD AUTO: 0.01 K/UL
IMM GRANULOCYTES NFR BLD AUTO: 0.4 %
INR PPP: 1.7
KETONES UR QL STRIP: ABNORMAL
LEUKOCYTE ESTERASE UR QL STRIP: ABNORMAL
LYMPHOCYTES # BLD AUTO: 0.7 K/UL
LYMPHOCYTES NFR BLD: 26.6 %
MAGNESIUM SERPL-MCNC: 3.3 MG/DL
MCH RBC QN AUTO: 34.8 PG
MCHC RBC AUTO-ENTMCNC: 32.7 G/DL
MCV RBC AUTO: 106 FL
MICROSCOPIC COMMENT: ABNORMAL
MONOCYTES # BLD AUTO: 0.6 K/UL
MONOCYTES NFR BLD: 21.4 %
NEUTROPHILS # BLD AUTO: 1.1 K/UL
NEUTROPHILS NFR BLD: 42 %
NITRITE UR QL STRIP: NEGATIVE
NRBC BLD-RTO: 0 /100 WBC
OVALOCYTES BLD QL SMEAR: ABNORMAL
PH UR STRIP: 5 [PH] (ref 5–8)
PHOSPHATE SERPL-MCNC: 5.7 MG/DL
PLATELET # BLD AUTO: 28 K/UL
PMV BLD AUTO: 14.1 FL
POIKILOCYTOSIS BLD QL SMEAR: SLIGHT
POLYCHROMASIA BLD QL SMEAR: ABNORMAL
POTASSIUM SERPL-SCNC: 4.5 MMOL/L
POTASSIUM SERPL-SCNC: 4.6 MMOL/L
POTASSIUM SERPL-SCNC: 4.7 MMOL/L
PROT SERPL-MCNC: 5 G/DL
PROT UR QL STRIP: ABNORMAL
PROT UR-MCNC: 74 MG/DL
PROT/CREAT RATIO, UR: 0.89
PROTHROMBIN TIME: 16.6 SEC
PTH-INTACT SERPL-MCNC: 34 PG/ML
RBC # BLD AUTO: 2.07 M/UL
RBC #/AREA URNS AUTO: >100 /HPF (ref 0–4)
SODIUM SERPL-SCNC: 146 MMOL/L
SODIUM SERPL-SCNC: 147 MMOL/L
SODIUM SERPL-SCNC: 147 MMOL/L
SP GR UR STRIP: 1.02 (ref 1–1.03)
TRANS ERYTHROCYTES VOL PATIENT: NORMAL ML
URN SPEC COLLECT METH UR: ABNORMAL
UROBILINOGEN UR STRIP-ACNC: NEGATIVE EU/DL
WBC # BLD AUTO: 2.71 K/UL
WBC #/AREA URNS AUTO: 25 /HPF (ref 0–5)

## 2018-07-21 PROCEDURE — P9021 RED BLOOD CELLS UNIT: HCPCS | Mod: NTX

## 2018-07-21 PROCEDURE — 99233 SBSQ HOSP IP/OBS HIGH 50: CPT | Mod: NTX,,, | Performed by: INTERNAL MEDICINE

## 2018-07-21 PROCEDURE — 25000003 PHARM REV CODE 250: Mod: NTX | Performed by: PHYSICIAN ASSISTANT

## 2018-07-21 PROCEDURE — 85014 HEMATOCRIT: CPT | Mod: NTX

## 2018-07-21 PROCEDURE — 25000003 PHARM REV CODE 250: Mod: NTX | Performed by: INTERNAL MEDICINE

## 2018-07-21 PROCEDURE — 20600001 HC STEP DOWN PRIVATE ROOM: Mod: NTX

## 2018-07-21 PROCEDURE — 25000003 PHARM REV CODE 250: Mod: NTX | Performed by: STUDENT IN AN ORGANIZED HEALTH CARE EDUCATION/TRAINING PROGRAM

## 2018-07-21 PROCEDURE — 82306 VITAMIN D 25 HYDROXY: CPT | Mod: NTX

## 2018-07-21 PROCEDURE — 85610 PROTHROMBIN TIME: CPT | Mod: NTX

## 2018-07-21 PROCEDURE — 99232 SBSQ HOSP IP/OBS MODERATE 35: CPT | Mod: GC,NTX,, | Performed by: INTERNAL MEDICINE

## 2018-07-21 PROCEDURE — 63600175 PHARM REV CODE 636 W HCPCS: Mod: NTX | Performed by: NURSE PRACTITIONER

## 2018-07-21 PROCEDURE — 80048 BASIC METABOLIC PNL TOTAL CA: CPT | Mod: NTX

## 2018-07-21 PROCEDURE — 36415 COLL VENOUS BLD VENIPUNCTURE: CPT | Mod: NTX

## 2018-07-21 PROCEDURE — 63600175 PHARM REV CODE 636 W HCPCS: Mod: NTX | Performed by: PHYSICIAN ASSISTANT

## 2018-07-21 PROCEDURE — 97116 GAIT TRAINING THERAPY: CPT | Mod: NTX

## 2018-07-21 PROCEDURE — 97535 SELF CARE MNGMENT TRAINING: CPT | Mod: NTX

## 2018-07-21 PROCEDURE — 85018 HEMOGLOBIN: CPT | Mod: NTX

## 2018-07-21 PROCEDURE — 85025 COMPLETE CBC W/AUTO DIFF WBC: CPT | Mod: NTX

## 2018-07-21 PROCEDURE — 83970 ASSAY OF PARATHORMONE: CPT | Mod: NTX

## 2018-07-21 PROCEDURE — 25000003 PHARM REV CODE 250: Mod: NTX | Performed by: NURSE PRACTITIONER

## 2018-07-21 PROCEDURE — 86920 COMPATIBILITY TEST SPIN: CPT | Mod: NTX

## 2018-07-21 PROCEDURE — 84100 ASSAY OF PHOSPHORUS: CPT | Mod: NTX

## 2018-07-21 PROCEDURE — 86850 RBC ANTIBODY SCREEN: CPT | Mod: NTX

## 2018-07-21 PROCEDURE — 80053 COMPREHEN METABOLIC PANEL: CPT | Mod: NTX

## 2018-07-21 PROCEDURE — 83735 ASSAY OF MAGNESIUM: CPT | Mod: NTX

## 2018-07-21 PROCEDURE — 81001 URINALYSIS AUTO W/SCOPE: CPT | Mod: NTX

## 2018-07-21 PROCEDURE — 25500020 PHARM REV CODE 255: Mod: NTX | Performed by: RADIOLOGY

## 2018-07-21 PROCEDURE — 87086 URINE CULTURE/COLONY COUNT: CPT | Mod: NTX

## 2018-07-21 PROCEDURE — 87040 BLOOD CULTURE FOR BACTERIA: CPT | Mod: 59,NTX

## 2018-07-21 RX ORDER — HYDROCODONE BITARTRATE AND ACETAMINOPHEN 500; 5 MG/1; MG/1
TABLET ORAL
Status: DISCONTINUED | OUTPATIENT
Start: 2018-07-21 | End: 2018-07-22

## 2018-07-21 RX ORDER — METOCLOPRAMIDE HYDROCHLORIDE 5 MG/ML
10 INJECTION INTRAMUSCULAR; INTRAVENOUS
Status: DISCONTINUED | OUTPATIENT
Start: 2018-07-21 | End: 2018-07-31

## 2018-07-21 RX ADMIN — LACTULOSE 45 G: 20 SOLUTION ORAL at 07:07

## 2018-07-21 RX ADMIN — SERTRALINE HYDROCHLORIDE 50 MG: 50 TABLET ORAL at 09:07

## 2018-07-21 RX ADMIN — OMEGA-3-ACID ETHYL ESTERS 2 G: 1 CAPSULE, LIQUID FILLED ORAL at 12:07

## 2018-07-21 RX ADMIN — DOCUSATE SODIUM 100 MG: 100 CAPSULE, LIQUID FILLED ORAL at 09:07

## 2018-07-21 RX ADMIN — OCTREOTIDE ACETATE 100 MCG: 100 INJECTION, SOLUTION INTRAVENOUS; SUBCUTANEOUS at 02:07

## 2018-07-21 RX ADMIN — TRIAMCINOLONE ACETONIDE: 1 CREAM TOPICAL at 08:07

## 2018-07-21 RX ADMIN — SODIUM BICARBONATE 650 MG TABLET 1950 MG: at 08:07

## 2018-07-21 RX ADMIN — LACTULOSE 200 G: 10 SOLUTION ORAL at 09:07

## 2018-07-21 RX ADMIN — OCTREOTIDE ACETATE 100 MCG: 100 INJECTION, SOLUTION INTRAVENOUS; SUBCUTANEOUS at 08:07

## 2018-07-21 RX ADMIN — TRIAMCINOLONE ACETONIDE: 1 CREAM TOPICAL at 09:07

## 2018-07-21 RX ADMIN — RIFAXIMIN 550 MG: 550 TABLET ORAL at 09:07

## 2018-07-21 RX ADMIN — OCTREOTIDE ACETATE 100 MCG: 100 INJECTION, SOLUTION INTRAVENOUS; SUBCUTANEOUS at 05:07

## 2018-07-21 RX ADMIN — METOCLOPRAMIDE 10 MG: 5 INJECTION, SOLUTION INTRAMUSCULAR; INTRAVENOUS at 05:07

## 2018-07-21 RX ADMIN — Medication 220 MG: at 08:07

## 2018-07-21 RX ADMIN — ATORVASTATIN CALCIUM 40 MG: 20 TABLET, FILM COATED ORAL at 09:07

## 2018-07-21 RX ADMIN — HEPARIN SODIUM 5000 UNITS: 5000 INJECTION, SOLUTION INTRAVENOUS; SUBCUTANEOUS at 08:07

## 2018-07-21 RX ADMIN — LACTULOSE 45 G: 20 SOLUTION ORAL at 01:07

## 2018-07-21 RX ADMIN — HEPARIN SODIUM 5000 UNITS: 5000 INJECTION, SOLUTION INTRAVENOUS; SUBCUTANEOUS at 05:07

## 2018-07-21 RX ADMIN — LACTULOSE 45 G: 20 SOLUTION ORAL at 08:07

## 2018-07-21 RX ADMIN — PANTOPRAZOLE SODIUM 40 MG: 40 TABLET, DELAYED RELEASE ORAL at 09:07

## 2018-07-21 RX ADMIN — LEVETIRACETAM 500 MG: 500 TABLET, FILM COATED ORAL at 08:07

## 2018-07-21 RX ADMIN — LEVETIRACETAM 500 MG: 500 TABLET, FILM COATED ORAL at 09:07

## 2018-07-21 RX ADMIN — RIFAXIMIN 550 MG: 550 TABLET ORAL at 08:07

## 2018-07-21 RX ADMIN — CEFEPIME 1 G: 1 INJECTION, POWDER, FOR SOLUTION INTRAMUSCULAR; INTRAVENOUS at 06:07

## 2018-07-21 RX ADMIN — METOCLOPRAMIDE 10 MG: 5 INJECTION, SOLUTION INTRAMUSCULAR; INTRAVENOUS at 08:07

## 2018-07-21 RX ADMIN — IOHEXOL 15 ML: 350 INJECTION, SOLUTION INTRAVENOUS at 11:07

## 2018-07-21 RX ADMIN — Medication 220 MG: at 09:07

## 2018-07-21 RX ADMIN — HEPARIN SODIUM 5000 UNITS: 5000 INJECTION, SOLUTION INTRAVENOUS; SUBCUTANEOUS at 01:07

## 2018-07-21 RX ADMIN — OXYCODONE HYDROCHLORIDE AND ACETAMINOPHEN 1000 MG: 500 TABLET ORAL at 09:07

## 2018-07-21 RX ADMIN — ONDANSETRON 4 MG: 4 TABLET, FILM COATED ORAL at 08:07

## 2018-07-21 RX ADMIN — CEFEPIME 1 G: 1 INJECTION, POWDER, FOR SOLUTION INTRAMUSCULAR; INTRAVENOUS at 05:07

## 2018-07-21 RX ADMIN — SODIUM BICARBONATE 650 MG TABLET 1950 MG: at 09:07

## 2018-07-21 RX ADMIN — FLUCONAZOLE 200 MG: 200 TABLET ORAL at 09:07

## 2018-07-21 RX ADMIN — PANTOPRAZOLE SODIUM 40 MG: 40 TABLET, DELAYED RELEASE ORAL at 08:07

## 2018-07-21 RX ADMIN — SODIUM BICARBONATE 650 MG TABLET 1950 MG: at 01:07

## 2018-07-21 NOTE — PROGRESS NOTES
[facility]  Infectious Disease  Progress Note    Patient Name: Alon Adamson  MRN: [id number]  Admission Date: 6/22/2018  Length of Stay: 29 days  Attending Physician: Elan Guerra MD  Primary Care Provider: Mckinley Vides MD    Isolation Status: No active isolations  Assessment/Plan:      Hypothermia    63M PMHC alcoholic cirrhosis, initially admitted for decompensated cirrhosis, seen by ID earlier during hospital admission for cellulitis and CONS in 2/4 blood cx bottles, now s/p 7 days of vancomycin. He has been persistently hypothermic to 93-94* requiring warming blanket. ID re-consulted for evaluation of hypothermia.    Recommendations:  - follow blood cx from 7/17, so far NGTD  - pt more awake today  - consider CT A/P to evaluate for new intra-abdominal pathology, as well as paracentesis to r/o SBP  - continue eval for non-infectious causes of hypothermia  - if CT A/P and fluid studies negative, recommend d/c cefepime and monitor clinically            Anticipated Disposition: tbd    Thank you for your consult. I will follow-up with patient. Please contact us if you have any additional questions.      Jackelyn Hampton DO  Infectious Disease Fellow  P: [phone]      Subjective:     Principal Problem:Acute kidney injury superimposed on chronic kidney disease    HPI: 64 y/o M h/o ETOH cirrhosis (PSE, pHTN) listed for liver txp, CKD3, CAD recently admitted for worsening PSE  requiring intubation admitted from clinic with hypervolemia, VICKY and b/l LE erythema. Here he has been afebrile, with diagnostic paracentesis unremarkable, found to have CONS 2/4 blood culture bottles with repeat negative.  He has been on empiric vancomycin and ceftriaxone and b/l LE erythema has improved and overall (combined with other interventions) his mental status has improved.  He remains deconditioned.    7/19: reconsulted for hypothermia. Pt was treated for 7 days on vancomycin for treatment of cellulitis, blood  culture that was + for CONs felt to be contaminate. He has been persistently hypothermic, requiring warming blanket for the past day. Pt states that he otherwise feels well. He was started on vanc and cefepime for empiric treatment.  Interval History: pt remains on warming blanket. More awake today, denies abd pain.     Review of Systems   Constitutional: Negative for fever.   Gastrointestinal: Negative for abdominal pain.   All other systems reviewed and are negative.    Objective:     Vital Signs (Most Recent):  Temp: 97.6 °F (36.4 °C) (07/21/18 1226)  Pulse: 71 (07/21/18 1226)  Resp: 16 (07/21/18 1226)  BP: 118/72 (07/21/18 1226)  SpO2: 95 % (07/21/18 1226) Vital Signs (24h Range):  Temp:  [93.3 °F (34.1 °C)-98.2 °F (36.8 °C)] 97.6 °F (36.4 °C)  Pulse:  [66-78] 71  Resp:  [15-20] 16  SpO2:  [95 %-99 %] 95 %  BP: ()/(44-72) 118/72     Weight: 126.3 kg (278 lb 7.1 oz)  Body mass index is 35.75 kg/m².    Estimated Creatinine Clearance: 29.2 mL/min (A) (based on SCr of 3.7 mg/dL (H)).    Physical Exam   Constitutional: He appears well-developed and well-nourished. No distress.   HENT:   Head: Atraumatic.   Right Ear: External ear normal.   Left Ear: External ear normal.   Nose: Nose normal.   Mouth/Throat: Oropharynx is clear and moist.   Eyes: EOM are normal.   Neck: Normal range of motion. Neck supple.   Cardiovascular: Normal rate, regular rhythm and normal heart sounds.    No murmur heard.  Pulmonary/Chest: Effort normal and breath sounds normal. No respiratory distress. He has no wheezes.   Abdominal: Soft. Bowel sounds are normal. He exhibits no distension. There is no tenderness.   Musculoskeletal: Normal range of motion. He exhibits no edema or deformity.   Neurological: No cranial nerve deficit.   Somnolent, arouses mostly w/ palpation to abdomen   Skin: Skin is dry. No rash noted. He is not diaphoretic.   Chronic skin changes b/l LE, no drainage   Psychiatric: He has a normal mood and affect. His  behavior is normal.       Significant Labs:   Bilirubin:     Recent Labs  Lab 07/18/18  0430 07/18/18  1456 07/19/18  0514 07/20/18  0639 07/21/18  0400   BILITOT 2.1* 2.3* 2.1* 1.8* 1.6*     Blood Culture:     Recent Labs  Lab 07/09/18  1217 07/09/18  1218 07/12/18  0943 07/13/18  0822 07/17/18  1130   LABBLOO No growth after 5 days. No growth after 5 days. No growth after 5 days.  No growth after 5 days. No growth after 5 days.  No growth after 5 days. No Growth to date  No Growth to date  No Growth to date  No Growth to date     CBC:     Recent Labs  Lab 07/20/18  0236 07/20/18  0639 07/20/18  1512 07/21/18  0401 07/21/18  0631   WBC 3.95 3.50*  --  2.71*  --    HGB 8.9* 8.2* 8.5* 7.2* 7.3*   HCT 26.7* 24.9* 27.1* 22.0* 22.8*   PLT 34* 31*  --  28*  --      CMP:     Recent Labs  Lab 07/20/18  0639 07/20/18  1512 07/20/18  2137 07/21/18  0400     145 146* 145 147*   K 4.4  4.6 4.3 4.6 4.6   *  120* 118* 118* 119*   CO2 15*  17* 16* 17* 17*     107 151* 167* 156*   BUN 64*  63* 65* 69* 69*   CREATININE 3.2*  3.3* 3.5* 3.5* 3.7*   CALCIUM 9.2  9.3 9.8 9.8 9.4   PROT 4.9*  --   --  5.0*   ALBUMIN 2.3*  --   --  2.6*   BILITOT 1.8*  --   --  1.6*   ALKPHOS 93  --   --  86   AST 63*  --   --  57*   ALT 38  --   --  34   ANIONGAP 11  8 12 10 11   EGFRNONAA 19.7*  19.0* 17.7* 17.7* 16.5*     Significant Imaging: I have reviewed all pertinent imaging results/findings within the past 24 hours.

## 2018-07-21 NOTE — PT/OT/SLP PROGRESS
"Physical Therapy Treatment    Patient Name:  Alon Adamson   MRN:  [id number]    Recommendations:     Discharge Recommendations:  nursing facility, skilled   Discharge Equipment Recommendations: none   Barriers to discharge: Inaccessible home and Decreased caregiver support    Assessment:     Alon Adamson is a 63 y.o. male admitted with a medical diagnosis of Acute kidney injury superimposed on chronic kidney disease.  He presents with the following impairments/functional limitations:  weakness, impaired functional mobilty, gait instability, impaired endurance, impaired self care skills, edema.  Pt tolerated session c c/o fatigue.  Performed all functional activities c CGA.  Pt able to amb short distance on this date - limited by fatigue and urge for BM.  Pt demo decreased gait speed, flat foot contact, steady, no LOB, decreased step length.  Pt required additional time to perform mobility throughout session.  Pt would benefit from continued skilled acute PT 4x/wk to improve functional mobility.      Rehab Prognosis:  good; patient would benefit from acute skilled PT services to address these deficits and reach maximum level of function.      Recent Surgery: * No surgery found *      Plan:     During this hospitalization, patient to be seen 4 x/week to address the above listed problems via gait training, therapeutic activities, therapeutic exercises, neuromuscular re-education  · Plan of Care Expires:  08/14/18   Plan of Care Reviewed with: patient, family    Subjective     Communicated with RN and OT prior to session.  Patient found HOB elevated and RN present upon PT entry to room, agreeable to treatment.      Chief Complaint: fatigue  Patient comments/goals: "I might have the squirts soon. I have to sit down."  Pain/Comfort:  · Pain Rating 1: 0/10    Patients cultural, spiritual, Worship conflicts given the current situation: none    Objective:     Patient found with: peripheral IV, griffin catheter "     General Precautions: Standard, fall   Orthopedic Precautions:N/A   Braces: N/A     Functional Mobility:  · Bed Mobility:     · Rolling Right: contact guard assistance  · Scooting: contact guard assistance  · Supine to Sit: contact guard assistance  · Transfers:     · Sit to Stand:  contact guard assistance with rolling walker  · Gait: 15ft c RW CGA  · Decreased gait speed, forward flexed posture, flat foot contact, decreased step length, no LOB, assistance c IV pole, WC in tow  · Balance: dynamic standing (CGA)      AM-PAC 6 CLICK MOBILITY  Turning over in bed (including adjusting bedclothes, sheets and blankets)?: 3  Sitting down on and standing up from a chair with arms (e.g., wheelchair, bedside commode, etc.): 3  Moving from lying on back to sitting on the side of the bed?: 3  Moving to and from a bed to a chair (including a wheelchair)?: 3  Need to walk in hospital room?: 3  Climbing 3-5 steps with a railing?: 1  Basic Mobility Total Score: 16       Therapeutic Activities and Exercises:  Educated on benefits of OOB activities; performing therex; safety c mobility - v/u  Mobility as stated above  Static standing 2x2min  Don briefs    Patient left up in WC with all lines intact, call button in reach, RN notified and family present..    GOALS:    Physical Therapy Goals        Problem: Physical Therapy Goal    Goal Priority Disciplines Outcome Goal Variances Interventions   Physical Therapy Goal     PT/OT, PT Ongoing (interventions implemented as appropriate)     Description:  Goals to be met by: 18     Patient will increase functional independence with mobility by performin. Supine to sit with Stand-by Assistance.  2. Sit to stand with Supervision.  3. Gait x 200 ft with Supervision with RW.  4. Ascend/descend 5 stair with right Handrails with SBA.  5. Lower extremity exercise program x 20 reps, with supervision, in order to increase LE strength and (I) with functional mobility.                                Time Tracking:     PT Received On: 07/21/18  PT Start Time: 1144     PT Stop Time: 1212  PT Total Time (min): 28 min     Billable Minutes: Gait Training 15 min Co-treat c OT    Treatment Type: Treatment  PT/PTA: PT     PTA Visit Number: 0     Mckinley Senior, PT  07/21/2018

## 2018-07-21 NOTE — PLAN OF CARE
Problem: Patient Care Overview  Goal: Plan of Care Review  Outcome: Ongoing (interventions implemented as appropriate)  * AAO x 4 delayed responses noted.   * Abdomen distended and soft.  * Bowel sound noted as hyperactive. Patient is incontinent of stool. Three bowel movements thus far dark brown stool noted  * 2 gm Na mechanical soft diet patient tolerating well. See chart for % eaten. One episode of coughing after swallowing noted. Notified DNP lactulose enema administered Improvements noted patient was able to take medications.   * Generalized ecchymosis noted   * Edema noted to the bilateral lower extremities +4 pitting. Generalized edema noted. +3 edema noted to the scrotum.  * Bed at lowest position and locked, call light within reach, non-slip socks on, wife and son at bedside, and side rails up x 2.  Encouraged patient to call for assistance when needed patient and family stated understanding.   * Left upper arm midline (7/16/18) pulled out on the morning of 7/20/18.   * Right upper arm midline placed on day shift (7/20/18) patent, dressing clean dry and intact no signs or symptoms of infection noted. Dressing due to be changed on 7/27/18, site to be changed on 8/17/18.  * Left upper arm PIV 22 G d/c on day shift no signs or symptoms of infection noted.  * Jaundice noted the skin & sclera.  * Patient has complaints of nausea/vomiting PRN medication administered full relief noted.  * Oxygen saturation 98 % on room air.  Respirations even and unlabored no signs or symptoms of distress noted.  * Patient has no complaints of pain at this time.  * Dermatitis/skin tear noted to the buttock barrier applied.   * Standard precautions maintained.  * Turning every two hours see flow sheet for specific documentation.   * [place] catheter (7/13/18) patent and intact secured to leg with securment device. Cloudy lynne urine noted see flow sheet for intake and output totals.  * BMP every 8 hours see chart for results no  change from previous.

## 2018-07-21 NOTE — SUBJECTIVE & OBJECTIVE
Scheduled Meds:   ascorbic acid (vitamin C)  1,000 mg Oral Daily    atorvastatin  40 mg Oral Daily    ceFEPime (MAXIPIME) IVPB  1 g Intravenous Q12H    docusate sodium  100 mg Oral Daily    ergocalciferol  50,000 Units Oral Q7 Days    fluconazole  200 mg Oral Daily    heparin (porcine)  5,000 Units Subcutaneous Q8H    lactulose  45 g Oral TID    levETIRAcetam  500 mg Oral BID    lidocaine  2 patch Transdermal Q24H    octreotide  100 mcg Subcutaneous Q8H    omega-3 acid ethyl esters  2 g Oral Daily with breakfast    pantoprazole  40 mg Oral BID    rifAXIMin  550 mg Oral BID    sertraline  50 mg Oral Daily    sodium bicarbonate  1,950 mg Oral TID    triamcinolone acetonide 0.1%   Topical (Top) BID    zinc sulfate  220 mg Oral BID     Continuous Infusions:  PRN Meds:sodium chloride, sodium chloride, sodium chloride, acetaminophen, diphenhydrAMINE-zinc acetate 1-0.1%, lactulose, metoclopramide HCl, ondansetron, sodium chloride 0.9%    Review of Systems   Constitutional: Positive for appetite change.   HENT: Negative.    Eyes: Positive for photophobia.   Respiratory: Negative.    Cardiovascular: Negative.    Gastrointestinal: Negative.    Genitourinary: Negative.    Musculoskeletal: Negative.    Skin: Negative.    Neurological: Negative.    Psychiatric/Behavioral: Negative.      Objective:     Vital Signs (Most Recent):  Temp: 97.6 °F (36.4 °C) (07/21/18 1226)  Pulse: 71 (07/21/18 1226)  Resp: 16 (07/21/18 1226)  BP: 118/72 (07/21/18 1226)  SpO2: 95 % (07/21/18 1226) Vital Signs (24h Range):  Temp:  [93.3 °F (34.1 °C)-98.2 °F (36.8 °C)] 97.6 °F (36.4 °C)  Pulse:  [66-78] 71  Resp:  [15-20] 16  SpO2:  [95 %-99 %] 95 %  BP: ()/(44-72) 118/72     Weight: 126.3 kg (278 lb 7.1 oz)  Body mass index is 35.75 kg/m².    Intake/Output - Last 3 Shifts       07/19 0700 - 07/20 0659 07/20 0700 - 07/21 0659 07/21 0700 - 07/22 0659    P.O. 1320 2100 420    I.V. (mL/kg) 20 (0.2) 100 (0.8)     Other 0      Total  Intake(mL/kg) 1340 (10.6) 2200 (17.4) 420 (3.3)    Urine (mL/kg/hr) 410 (0.1) 430 (0.1) 75 (0.1)    Emesis/NG output 850 (0.3)      Other 0 (0)      Stool 0 (0) 4 (0)     Blood 0 (0)      Total Output 1260 434 75    Net +80 +1766 +345           Urine Occurrence 0 x      Stool Occurrence 5 x 6 x 3 x    Emesis Occurrence 0 x  1 x          Physical Exam   Constitutional: He is oriented to person, place, and time. He appears well-developed and well-nourished.   HENT:   Head: Normocephalic and atraumatic.   Eyes: Conjunctivae and EOM are normal. Pupils are equal, round, and reactive to light. No scleral icterus.   Neck: Normal range of motion. Neck supple. No thyromegaly present.   Cardiovascular: Normal rate, regular rhythm and normal heart sounds.    Pulmonary/Chest: Effort normal and breath sounds normal. He has no rales.   Abdominal: Soft. Bowel sounds are normal. He exhibits no distension and no mass. There is no tenderness.   Musculoskeletal: Normal range of motion. He exhibits no edema.   Neurological: He is alert and oriented to person, place, and time.   Skin: Skin is warm and dry. No rash noted.   Psychiatric: He has a normal mood and affect.   Vitals reviewed.      Laboratory:  Immunosuppressants     None        CBC:   Recent Labs  Lab 07/21/18  0401 07/21/18  0631   WBC 2.71*  --    RBC 2.07*  --    HGB 7.2* 7.3*   HCT 22.0* 22.8*   PLT 28*  --    *  --    MCH 34.8*  --    MCHC 32.7  --      CMP:   Recent Labs  Lab 07/21/18  0400   *   CALCIUM 9.4   ALBUMIN 2.6*   PROT 5.0*   *   K 4.6   CO2 17*   *   BUN 69*   CREATININE 3.7*   ALKPHOS 86   ALT 34   AST 57*   BILITOT 1.6*     Coagulation:   Recent Labs  Lab 07/21/18  0401   INR 1.7*     Labs within the past 24 hours have been reviewed.    Diagnostic Results:  I have personally reviewed all pertinent imaging studies.

## 2018-07-21 NOTE — PROGRESS NOTES
"[facility]  Endocrinology  Progress Note    Admit Date: 6/22/2018     64 yo male with diagnosis of ESLD secondary to EtOH cirrhosis with severe complications (hyperbilirubinemia, hypoalbuminemia, severe malnutrition, hepatic encephalopathy, thrombocytopenia, splenomegaly, ascites, hypervolemia, coaguloopathy, portal HTN), seizure disorder on Keppra, CKD, CAD, is currently being treated for decompensated liver disease. Hospital course was complicated by hepatic encephalopathy. During the course, he has also had few episodes of hypothermia with temp as low as 93 F. He completed 7 day course of vancomycin for cellilitis, and is currently on vancomycin and cefepime for possible sepsis. However he continues to be hypothermic.   Further work-up of hypothermia was done, which showed TSH: 2.714, FT4: 0.53, AM cortisol: 9.9.    Interval HPI:   Overnight events:  VS stable.  Pt's temp has remained stable over the past 12 hours.  Eating:   <25%  Nausea: No  Hypoglycemia and intervention: No  Fever: No  TPN and/or TF: No    BP (!) 104/52 (Patient Position: Lying)   Pulse 76   Temp 97 °F (36.1 °C) (Oral)   Resp 16   Ht 6' 2" (1.88 m)   Wt 126.3 kg (278 lb 7.1 oz)   SpO2 95%   BMI 35.75 kg/m²       Labs Reviewed and Include      Recent Labs  Lab 07/21/18  0400   *   CALCIUM 9.4   ALBUMIN 2.6*   PROT 5.0*   *   K 4.6   CO2 17*   *   BUN 69*   CREATININE 3.7*   ALKPHOS 86   ALT 34   AST 57*   BILITOT 1.6*     Lab Results   Component Value Date    WBC 2.71 (L) 07/21/2018    HGB 7.3 (L) 07/21/2018    HCT 22.8 (L) 07/21/2018     (H) 07/21/2018    PLT 28 (LL) 07/21/2018       Recent Labs  Lab 07/20/18  0639   TSH 2.714   FREET4 0.53*     Lab Results   Component Value Date    HGBA1C 5.7 (H) 06/09/2018       Nutritional status:   Body mass index is 35.75 kg/m².  Lab Results   Component Value Date    ALBUMIN 2.6 (L) 07/21/2018    ALBUMIN 2.3 (L) 07/20/2018    ALBUMIN 2.5 (L) 07/19/2018 "     Lab Results   Component Value Date    PREALBUMIN 4 (L) 06/30/2018    PREALBUMIN 5 (L) 01/22/2018    PREALBUMIN 7 (L) 08/31/2016       Estimated Creatinine Clearance: 29.2 mL/min (A) (based on SCr of 3.7 mg/dL (H)).    Accu-Checks  No results for input(s): POCTGLUCOSE in the last 72 hours.    Current Medications and/or Treatments Impacting Glycemic Control  Immunotherapy:  Immunosuppressants     None        Steroids:   Hormones     Start     Stop Route Frequency Ordered    07/20/18 1400  octreotide injection 100 mcg      -- SubQ Every 8 hours 07/20/18 0950        Pressors:    Autonomic Drugs     None        Hyperglycemia/Diabetes Medications: Antihyperglycemics     None          ASSESSMENT and PLAN    Hypothermia    Pt's temperature has improved.     Pt's current thyroid function is less likely to be a cause for hypothermia.   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   Would repeat TFTs on Monday.     AM Cortisol level is within normal limits, suggesting normal adrenal function. Thus it is less likely that adrenal insuffiencey would be causing hypothermia. However if pt becomes symptomatic requiring pressors, would consider adding hydrocortisone.         Hepatic encephalopathy    Management as per primary team              Rubina Paul MD  Endocrinology  [facility]

## 2018-07-21 NOTE — PLAN OF CARE
Problem: Occupational Therapy Goal  Goal: Occupational Therapy Goal  Goals to be met by: 7/30/18    Patient will increase functional independence with ADLs by performing:     Upper body dressing while seated EOB with supervision ( including snap/tie completion)  Grooming while standing at sink with Stand-by Assistance  Toileting from toilet with Stand-by Assistance for hygiene and clothing management.  Toilet transfer to toilet with Stand-by Assistance.  Pt will complete a functional dynamic standing activity ~8 minutes without a rest break.   Pt will complete therapy session with minimal cues provided for re-direction. Met 7/21 (continue)  Pt oriented to person, place, time, and situation with independence.          Outcome: Ongoing (interventions implemented as appropriate)  Continue with POC.   Aida buckley OT  7/21/2018

## 2018-07-21 NOTE — NURSING
Patient reassessed for confusion:    When asked patient stated he felt ok but confuse at this time. This RN asked general questions regarding orientation all questions answered correctly, delayed responses noted but that is his baseline at this time. I then asked patient why he thought he was confused. Patient stated that they were looking at a possible liver kidney transplant and that has him nervous and confused. This RN discussed with patient possible kidney transplant and reassured patient he was not confused he just had questions about it. Patient stated ok and repositioned in bed on left side with one pillow and he was pulled up in bed. Bear hugger remains on at this time at a setting of 38 degrees C temperatures remain WNL at this time.  Bed at lowest position and locked, call light within reach, non-slip socks on, wife and son at bedside, and side rails up x 2.  Encouraged patient to call for assistance when needed patient stated understanding.

## 2018-07-21 NOTE — TELEPHONE ENCOUNTER
PATIENT NAME: Alon Muller rohan  [facility] #: [id number]    Lab Results   Component Value Date    CREATININE 3.5 (H) 07/20/2018     07/20/2018    BILITOT 1.8 (H) 07/20/2018    ALBUMIN 2.3 (L) 07/20/2018    INR 1.6 (H) 07/20/2018       Encephalopathy: 1 - 2  Ascites: moderate  Dialysis: no     Recertification requestor: Dragan Alvarez

## 2018-07-21 NOTE — SUBJECTIVE & OBJECTIVE
Interval History: pt remains on warming blanket. More awake today, denies abd pain.     Review of Systems   Constitutional: Negative for fever.   Gastrointestinal: Negative for abdominal pain.   All other systems reviewed and are negative.    Objective:     Vital Signs (Most Recent):  Temp: 97.6 °F (36.4 °C) (07/21/18 1226)  Pulse: 71 (07/21/18 1226)  Resp: 16 (07/21/18 1226)  BP: 118/72 (07/21/18 1226)  SpO2: 95 % (07/21/18 1226) Vital Signs (24h Range):  Temp:  [93.3 °F (34.1 °C)-98.2 °F (36.8 °C)] 97.6 °F (36.4 °C)  Pulse:  [66-78] 71  Resp:  [15-20] 16  SpO2:  [95 %-99 %] 95 %  BP: ()/(44-72) 118/72     Weight: 126.3 kg (278 lb 7.1 oz)  Body mass index is 35.75 kg/m².    Estimated Creatinine Clearance: 29.2 mL/min (A) (based on SCr of 3.7 mg/dL (H)).    Physical Exam   Constitutional: He appears well-developed and well-nourished. No distress.   HENT:   Head: Atraumatic.   Right Ear: External ear normal.   Left Ear: External ear normal.   Nose: Nose normal.   Mouth/Throat: Oropharynx is clear and moist.   Eyes: EOM are normal.   Neck: Normal range of motion. Neck supple.   Cardiovascular: Normal rate, regular rhythm and normal heart sounds.    No murmur heard.  Pulmonary/Chest: Effort normal and breath sounds normal. No respiratory distress. He has no wheezes.   Abdominal: Soft. Bowel sounds are normal. He exhibits no distension. There is no tenderness.   Musculoskeletal: Normal range of motion. He exhibits no edema or deformity.   Neurological: No cranial nerve deficit.   Somnolent, arouses mostly w/ palpation to abdomen   Skin: Skin is dry. No rash noted. He is not diaphoretic.   Chronic skin changes b/l LE, no drainage   Psychiatric: He has a normal mood and affect. His behavior is normal.       Significant Labs:   Bilirubin:     Recent Labs  Lab 07/18/18  0430 07/18/18  1456 07/19/18  0514 07/20/18  0639 07/21/18  0400   BILITOT 2.1* 2.3* 2.1* 1.8* 1.6*     Blood Culture:     Recent Labs  Lab  07/09/18  1217 07/09/18  1218 07/12/18  0943 07/13/18  0822 07/17/18  1130   LABBLOO No growth after 5 days. No growth after 5 days. No growth after 5 days.  No growth after 5 days. No growth after 5 days.  No growth after 5 days. No Growth to date  No Growth to date  No Growth to date  No Growth to date     CBC:     Recent Labs  Lab 07/20/18  0236 07/20/18  0639 07/20/18  1512 07/21/18  0401 07/21/18  0631   WBC 3.95 3.50*  --  2.71*  --    HGB 8.9* 8.2* 8.5* 7.2* 7.3*   HCT 26.7* 24.9* 27.1* 22.0* 22.8*   PLT 34* 31*  --  28*  --      CMP:     Recent Labs  Lab 07/20/18  0639 07/20/18  1512 07/20/18  2137 07/21/18  0400     145 146* 145 147*   K 4.4  4.6 4.3 4.6 4.6   *  120* 118* 118* 119*   CO2 15*  17* 16* 17* 17*     107 151* 167* 156*   BUN 64*  63* 65* 69* 69*   CREATININE 3.2*  3.3* 3.5* 3.5* 3.7*   CALCIUM 9.2  9.3 9.8 9.8 9.4   PROT 4.9*  --   --  5.0*   ALBUMIN 2.3*  --   --  2.6*   BILITOT 1.8*  --   --  1.6*   ALKPHOS 93  --   --  86   AST 63*  --   --  57*   ALT 38  --   --  34   ANIONGAP 11  8 12 10 11   EGFRNONAA 19.7*  19.0* 17.7* 17.7* 16.5*     Significant Imaging: I have reviewed all pertinent imaging results/findings within the past 24 hours.

## 2018-07-21 NOTE — NURSING
Patient sitting up when given water coughing noted. Notified AURELIA Regalado DNP per NP administer Lactulose enema (per MAR) and reassess ability to swallow. Will monitor.

## 2018-07-21 NOTE — ASSESSMENT & PLAN NOTE
- Completed vanc x 7 days for BLE cellulitis  - 2D Echo 7/9 normal EF (55-60%), trivial tricuspid/mitral regurg.  - Vanc now restarted given recent episodes of hypothermia.   -Started trial of lasix per Nephrology.

## 2018-07-21 NOTE — PLAN OF CARE
Pt lethargic this morning, but oriented x4 and arousable to voice. Lactulose oral and enema given, and pt became alert. He vomited this morning after eating a few bites. Reglan given and provided relief. Reglan also given before dinner and he did not experience any nausea. [place] changed out today. Blood and urine cultures done. KUB done, CT abd ordered. H/H dropped to 7.3/22.8 today, 1U RBC given.  Blood in urine and coming from penis. 175mL UO today. He sat up in the chair for most of the day. His temp dropped down to 96 after not having blankets on him for a few hours. He has been put back on the bare hugger and temp is coming up. Other vitals are stable. He is up with one assist. He has had multiple large BMs today. Sacral wound bleeding; foam dressing applied. His appetite is improved today.     Pt sitting up and eating dinner. I went in to check on him prior to shift change and he is lethargic and difficult to arouse. He does arouse to repeated voice and touch. Waiting on lactulose enema to be sent from pharmacy.

## 2018-07-21 NOTE — SUBJECTIVE & OBJECTIVE
Interval History: NABHUMIKAON, Patient sitting on the cjair with family at bed side. Na 147, encoruged patient to increase free water intake. , Cr is increasing, 3.7. Patient is loosing protein and showing blood in urine, Renal biopsy could be favorable. BP lower than baseline but stable.      Review of patient's allergies indicates:   Allergen Reactions    Nsaids (non-steroidal anti-inflammatory drug)      D/t to liver disease.    Tylenol [acetaminophen]      D/t liver disease.    Penicillins Other (See Comments)     Tolerated pip-tazo in 8/2016 without issue  Since childhood was told not to take it     Current Facility-Administered Medications   Medication Frequency    0.9%  NaCl infusion (for blood administration) Q24H PRN    0.9%  NaCl infusion (for blood administration) Q24H PRN    0.9%  NaCl infusion (for blood administration) Q24H PRN    acetaminophen tablet 650 mg Q8H PRN    ascorbic acid (vitamin C) tablet 1,000 mg Daily    atorvastatin tablet 40 mg Daily    ceFEPIme injection 1 g Q12H    diphenhydrAMINE-zinc acetate 1-0.1% cream TID PRN    docusate sodium capsule 100 mg Daily    ergocalciferol capsule 50,000 Units Q7 Days    fluconazole tablet 200 mg Daily    heparin (porcine) injection 5,000 Units Q8H    lactulose 10 gram/15 mL solution (enema) 200 g Q6H PRN    lactulose 20 gram/30 mL solution Soln 45 g TID    levETIRAcetam tablet 500 mg BID    lidocaine 5 % patch 2 patch Q24H    metoclopramide HCl injection 10 mg QID (AC + HS) PRN    octreotide injection 100 mcg Q8H    omega-3 acid ethyl esters capsule 2 g Daily with breakfast    ondansetron tablet 4 mg Q8H PRN    pantoprazole EC tablet 40 mg BID    rifAXIMin tablet 550 mg BID    sertraline tablet 50 mg Daily    sodium bicarbonate tablet 1,950 mg TID    sodium chloride 0.9% flush 3 mL PRN    triamcinolone acetonide 0.1% cream BID    zinc sulfate capsule 220 mg BID       Objective:     Vital Signs (Most Recent):  Temp: 97 °F  (36.1 °C) (07/21/18 1415)  Pulse: 67 (07/21/18 1415)  Resp: 18 (07/21/18 1415)  BP: (!) 128/54 (07/21/18 1415)  SpO2: 96 % (07/21/18 1415)  O2 Device (Oxygen Therapy): room air (07/21/18 1415) Vital Signs (24h Range):  Temp:  [93.3 °F (34.1 °C)-98.2 °F (36.8 °C)] 97 °F (36.1 °C)  Pulse:  [66-78] 67  Resp:  [15-20] 18  SpO2:  [95 %-99 %] 96 %  BP: ()/(44-72) 128/54     Weight: 126.3 kg (278 lb 7.1 oz) (07/20/18 0700)  Body mass index is 35.75 kg/m².  Body surface area is 2.57 meters squared.    I/O last 3 completed shifts:  In: 2700 [P.O.:2600; I.V.:100]  Out: 619 [Urine:615; Stool:4]    Physical Exam   Constitutional: He is oriented to person, place, and time. He appears well-developed. No distress.   HENT:   Head: Normocephalic and atraumatic.   Eyes: Pupils are equal, round, and reactive to light.   Neck: No JVD present.   Cardiovascular: Normal rate, regular rhythm, normal heart sounds and intact distal pulses.  Exam reveals no friction rub.    No murmur heard.  Pulmonary/Chest: Effort normal and breath sounds normal. Stridor present. No respiratory distress. He has no wheezes. He has no rales.   Abdominal: Soft. Bowel sounds are normal. He exhibits distension. There is no tenderness.   Musculoskeletal: He exhibits edema. He exhibits no tenderness or deformity.   Neurological: He is alert and oriented to person, place, and time.   Skin: Skin is warm and dry. Capillary refill takes 2 to 3 seconds. Rash noted. He is not diaphoretic. There is erythema.   Psychiatric: He has a normal mood and affect. His behavior is normal. Judgment and thought content normal.   Vitals reviewed.      Significant Labs:  CBC:   Recent Labs  Lab 07/21/18  0401 07/21/18  0631   WBC 2.71*  --    RBC 2.07*  --    HGB 7.2* 7.3*   HCT 22.0* 22.8*   PLT 28*  --    *  --    MCH 34.8*  --    MCHC 32.7  --      CMP:   Recent Labs  Lab 07/21/18  0400   *   CALCIUM 9.4   ALBUMIN 2.6*   PROT 5.0*   *   K 4.6   CO2 17*   CL  119*   BUN 69*   CREATININE 3.7*   ALKPHOS 86   ALT 34   AST 57*   BILITOT 1.6*

## 2018-07-21 NOTE — TELEPHONE ENCOUNTER
PATIENT NAME: Alon Muller [facility] #: [id number]    Lab Results   Component Value Date    CREATININE 3.7 (H) 07/21/2018     (H) 07/21/2018    BILITOT 1.6 (H) 07/21/2018    ALBUMIN 2.6 (L) 07/21/2018    INR 1.7 (H) 07/21/2018       Encephalopathy: 1 - 2  Ascites: moderate  Dialysis: no     Recertification requestor: Cecily Church

## 2018-07-21 NOTE — PLAN OF CARE
Problem: Physical Therapy Goal  Goal: Physical Therapy Goal  Goals to be met by: 18     Patient will increase functional independence with mobility by performin. Supine to sit with Stand-by Assistance.  2. Sit to stand with Supervision.  3. Gait x 200 ft with Supervision with RW.  4. Ascend/descend 5 stair with right Handrails with SBA.  5. Lower extremity exercise program x 20 reps, with supervision, in order to increase LE strength and (I) with functional mobility.              Outcome: Ongoing (interventions implemented as appropriate)  Progressing towards goals    Mckinley Senior DPT  2018

## 2018-07-21 NOTE — ASSESSMENT & PLAN NOTE
- Placed status 7 on 6/26 for prescription drug coverage change. Re-activated 7/3. Back on hold d/t frailty and decompensation at the end of last week prompting ICU transfer. Now off hold 7/17.  Plan to submit MELD exception points. Will remeld today at 27.       MELD-Na score: 27 at 7/21/2018  4:01 AM  MELD score: 27 at 7/21/2018  4:01 AM  Calculated from:  Serum Creatinine: 3.7 mg/dL at 7/21/2018  4:00 AM  Serum Sodium: 147 mmol/L (Rounded to 137) at 7/21/2018  4:00 AM  Total Bilirubin: 1.6 mg/dL at 7/21/2018  4:00 AM  INR(ratio): 1.7 at 7/21/2018  4:01 AM  Age: 63 years

## 2018-07-21 NOTE — SUBJECTIVE & OBJECTIVE
"Interval HPI:   Overnight events:  VS stable.  Pt's temp has remained stable over the past 12 hours.  Eating:   <25%  Nausea: No  Hypoglycemia and intervention: No  Fever: No  TPN and/or TF: No    BP (!) 104/52 (Patient Position: Lying)   Pulse 76   Temp 97 °F (36.1 °C) (Oral)   Resp 16   Ht 6' 2" (1.88 m)   Wt 126.3 kg (278 lb 7.1 oz)   SpO2 95%   BMI 35.75 kg/m²     Labs Reviewed and Include      Recent Labs  Lab 07/21/18  0400   *   CALCIUM 9.4   ALBUMIN 2.6*   PROT 5.0*   *   K 4.6   CO2 17*   *   BUN 69*   CREATININE 3.7*   ALKPHOS 86   ALT 34   AST 57*   BILITOT 1.6*     Lab Results   Component Value Date    WBC 2.71 (L) 07/21/2018    HGB 7.3 (L) 07/21/2018    HCT 22.8 (L) 07/21/2018     (H) 07/21/2018    PLT 28 (LL) 07/21/2018       Recent Labs  Lab 07/20/18  0639   TSH 2.714   FREET4 0.53*     Lab Results   Component Value Date    HGBA1C 5.7 (H) 06/09/2018       Nutritional status:   Body mass index is 35.75 kg/m².  Lab Results   Component Value Date    ALBUMIN 2.6 (L) 07/21/2018    ALBUMIN 2.3 (L) 07/20/2018    ALBUMIN 2.5 (L) 07/19/2018     Lab Results   Component Value Date    PREALBUMIN 4 (L) 06/30/2018    PREALBUMIN 5 (L) 01/22/2018    PREALBUMIN 7 (L) 08/31/2016       Estimated Creatinine Clearance: 29.2 mL/min (A) (based on SCr of 3.7 mg/dL (H)).    Accu-Checks  No results for input(s): POCTGLUCOSE in the last 72 hours.    Current Medications and/or Treatments Impacting Glycemic Control  Immunotherapy:  Immunosuppressants     None        Steroids:   Hormones     Start     Stop Route Frequency Ordered    07/20/18 1400  octreotide injection 100 mcg      -- SubQ Every 8 hours 07/20/18 0950        Pressors:    Autonomic Drugs     None        Hyperglycemia/Diabetes Medications: Antihyperglycemics     None        "

## 2018-07-21 NOTE — ASSESSMENT & PLAN NOTE
Pt's temperature has improved.     Pt's current thyroid function is less likely to be a cause for hypothermia.   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   Would repeat TFTs on Monday.     AM Cortisol level is within normal limits, suggesting normal adrenal function. Thus it is less likely that adrenal insuffiencey would be causing hypothermia. However if pt becomes symptomatic requiring pressors, would consider adding hydrocortisone.

## 2018-07-21 NOTE — PT/OT/SLP PROGRESS
Occupational Therapy   Treatment    Name: Alon Adamson  MRN: [id number]  Admitting Diagnosis:  Acute kidney injury superimposed on chronic kidney disease       Recommendations:     Discharge Recommendations: nursing facility, skilled  Discharge Equipment Recommendations:  none  Barriers to discharge:  None    Subjective     Communicated with: RN prior to session. Pt agreeable to participate with therapy. Family present during session.   Pain/Comfort:  · Pain Rating 1: 0/10  · Pain Rating Post-Intervention 1: 0/10    Patients cultural, spiritual, Jehovah's witness conflicts given the current situation: none    Objective:     Patient found with: griffin catheter, peripheral IV    General Precautions: Standard, fall   Orthopedic Precautions:N/A   Braces: N/A     Occupational Performance:    Bed Mobility:    · Patient completed Rolling/Turning to Right with minimum assistance  · Patient completed Supine to Sit with contact guard assistance    Functional Mobility/Transfers:  · Patient completed Sit <> Stand Transfer with contact guard assistance  with  rolling walker   · Patient completed Bed <> Chair Transfer using Stand Pivot technique with contact guard assistance with rolling walker  · Functional Mobility: Pt completed functional mobility from EOB < to hallway ( ~15 feet using RW) with CGA.     Activities of Daily Living:  · Feeding:  set-up assistance and Supervision while seated in wheel chair   · Upper Body Dressing: moderate assistance to fer gown like jacket while seated EOB   · Lower Body Dressing: maximal assistance to fer brief to knees while seated EOB; TA to fer to waist while standing; TA to fer B socks    Patient left up in chair with all lines intact, call button in reach, RN notified and family present    AMPA 6 Click:  AMPA Total Score: 14    Treatment & Education:  -Pt edu on OT role/POC  -Importance of OOB activity with staff assistance-- UIC during each meal  -Safety during functional t/f and  mobility-- RW management   - White board updated  - Multiple self care tasks completed-- assistance level noted above   - All questions/concerns answered within OT scope of practice  Education:    Assessment:     Alon Adamson is a 63 y.o. male with a medical diagnosis of Acute kidney injury superimposed on chronic kidney disease.  He tolerated treatment well and progressing towards goals. Pt requires CGA with functional mobility and mod-max A with dressing tasks this date.  Performance deficits affecting function are weakness, impaired endurance, impaired functional mobilty, gait instability, impaired balance, impaired self care skills, edema, decreased ROM. Pt would benefit from SNF following d/c to continue to progress towards goals and improve quality of life.       Rehab Prognosis:  Good; patient would benefit from acute skilled OT services to address these deficits and reach maximum level of function.       Plan:     Patient to be seen 4 x/week to address the above listed problems via self-care/home management, therapeutic activities, therapeutic exercises, neuromuscular re-education  · Plan of Care Expires: 07/28/18  · Plan of Care Reviewed with: patient, family    This Plan of care has been discussed with the patient who was involved in its development and understands and is in agreement with the identified goals and treatment plan    GOALS:    Occupational Therapy Goals        Problem: Occupational Therapy Goal    Goal Priority Disciplines Outcome Interventions   Occupational Therapy Goal     OT, PT/OT Ongoing (interventions implemented as appropriate)    Description:  Goals to be met by: 7/30/18    Patient will increase functional independence with ADLs by performing:     Upper body dressing while seated EOB with supervision ( including snap/tie completion)  Grooming while standing at sink with Stand-by Assistance  Toileting from toilet with Stand-by Assistance for hygiene and clothing  management.  Toilet transfer to toilet with Stand-by Assistance.  Pt will complete a functional dynamic standing activity ~8 minutes without a rest break.   Pt will complete therapy session with minimal cues provided for re-direction. Met 7/21 (continue)  Pt oriented to person, place, time, and situation with independence.                            Time Tracking:     OT Date of Treatment: 07/21/18  OT Start Time: 1144  OT Stop Time: 1212  OT Total Time (min): 28 min   Co-tx with PT    Billable Minutes:Self Care/Home Management 14    Aida buckley OT  7/21/2018

## 2018-07-21 NOTE — PROGRESS NOTES
[facility]  Nephrology  Progress Note    Patient Name: Alon Adamson  MRN: [id number]  Admission Date: 6/22/2018  Hospital Length of Stay: 29 days  Attending Provider: Elan Guerra MD   Primary Care Physician: Mckinley Vides MD  Principal Problem:Acute kidney injury superimposed on chronic kidney disease    Subjective:     HPI: Mr. Adamson is 63 yr old male with decompensated alcoholic cirrhosis, CKD III and CAD admitted here on 06/22/18 admitted for bilateral LE hypervolemia and VICKY on CKD III with cr of 2.3 baseline around 1.5. Patient has multiple hospitalization in the past secondary to decompensated liver failure. Patient is currently on transplant team. During current admission patient was getting lasix and albumin intermittently for VICKY however renal function was not getting better. Patient hospital course complicated by hepatic encephalopathy with some improvement of mentation with lactulose. Also treated for cellulitis with 7 days of vancomycin.Nephrology is consulted for worsening/not improving VICKY despite lasix/albumin.     Per chart review patient has no hypotensive episodes, BP mostly above 100's. Has not received nephrotoxins such as NSAIDs/contrast media. No sever sepsis/septic shock or acute blood loss during current admit. UA with 2+ leukocytes and Hyaline casts, no wbc/rbc cast or proteinuria. Urine Na+ < 20.    Patient was transferred to ICU on 7/13/2018 after being more lethargic and hypoactive.  After two days was stepped down back to [facility].       Interval History: NAEON, Patient sitting on the cjair with family at bed side. Na 147, encoruged patient to increase free water intake. , Cr is increasing, 3.7. Patient is loosing protein and showing blood in urine, Renal biopsy could be favorable. BP lower than baseline but stable.      Review of patient's allergies indicates:   Allergen Reactions    Nsaids (non-steroidal anti-inflammatory drug)      D/t to  liver disease.    Tylenol [acetaminophen]      D/t liver disease.    Penicillins Other (See Comments)     Tolerated pip-tazo in 8/2016 without issue  Since childhood was told not to take it     Current Facility-Administered Medications   Medication Frequency    0.9%  NaCl infusion (for blood administration) Q24H PRN    0.9%  NaCl infusion (for blood administration) Q24H PRN    0.9%  NaCl infusion (for blood administration) Q24H PRN    acetaminophen tablet 650 mg Q8H PRN    ascorbic acid (vitamin C) tablet 1,000 mg Daily    atorvastatin tablet 40 mg Daily    ceFEPIme injection 1 g Q12H    diphenhydrAMINE-zinc acetate 1-0.1% cream TID PRN    docusate sodium capsule 100 mg Daily    ergocalciferol capsule 50,000 Units Q7 Days    fluconazole tablet 200 mg Daily    heparin (porcine) injection 5,000 Units Q8H    lactulose 10 gram/15 mL solution (enema) 200 g Q6H PRN    lactulose 20 gram/30 mL solution Soln 45 g TID    levETIRAcetam tablet 500 mg BID    lidocaine 5 % patch 2 patch Q24H    metoclopramide HCl injection 10 mg QID (AC + HS) PRN    octreotide injection 100 mcg Q8H    omega-3 acid ethyl esters capsule 2 g Daily with breakfast    ondansetron tablet 4 mg Q8H PRN    pantoprazole EC tablet 40 mg BID    rifAXIMin tablet 550 mg BID    sertraline tablet 50 mg Daily    sodium bicarbonate tablet 1,950 mg TID    sodium chloride 0.9% flush 3 mL PRN    triamcinolone acetonide 0.1% cream BID    zinc sulfate capsule 220 mg BID       Objective:     Vital Signs (Most Recent):  Temp: 97 °F (36.1 °C) (07/21/18 1415)  Pulse: 67 (07/21/18 1415)  Resp: 18 (07/21/18 1415)  BP: (!) 128/54 (07/21/18 1415)  SpO2: 96 % (07/21/18 1415)  O2 Device (Oxygen Therapy): room air (07/21/18 1415) Vital Signs (24h Range):  Temp:  [93.3 °F (34.1 °C)-98.2 °F (36.8 °C)] 97 °F (36.1 °C)  Pulse:  [66-78] 67  Resp:  [15-20] 18  SpO2:  [95 %-99 %] 96 %  BP: ()/(44-72) 128/54     Weight: 126.3 kg (278 lb 7.1 oz) (07/20/18  0700)  Body mass index is 35.75 kg/m².  Body surface area is 2.57 meters squared.    I/O last 3 completed shifts:  In: 2700 [P.O.:2600; I.V.:100]  Out: 619 [Urine:615; Stool:4]    Physical Exam   Constitutional: He is oriented to person, place, and time. He appears well-developed. No distress.   HENT:   Head: Normocephalic and atraumatic.   Eyes: Pupils are equal, round, and reactive to light.   Neck: No JVD present.   Cardiovascular: Normal rate, regular rhythm, normal heart sounds and intact distal pulses.  Exam reveals no friction rub.    No murmur heard.  Pulmonary/Chest: Effort normal and breath sounds normal. Stridor present. No respiratory distress. He has no wheezes. He has no rales.   Abdominal: Soft. Bowel sounds are normal. He exhibits distension. There is no tenderness.   Musculoskeletal: He exhibits edema. He exhibits no tenderness or deformity.   Neurological: He is alert and oriented to person, place, and time.   Skin: Skin is warm and dry. Capillary refill takes 2 to 3 seconds. Rash noted. He is not diaphoretic. There is erythema.   Psychiatric: He has a normal mood and affect. His behavior is normal. Judgment and thought content normal.   Vitals reviewed.      Significant Labs:  CBC:   Recent Labs  Lab 07/21/18  0401 07/21/18  0631   WBC 2.71*  --    RBC 2.07*  --    HGB 7.2* 7.3*   HCT 22.0* 22.8*   PLT 28*  --    *  --    MCH 34.8*  --    MCHC 32.7  --      CMP:   Recent Labs  Lab 07/21/18  0400   *   CALCIUM 9.4   ALBUMIN 2.6*   PROT 5.0*   *   K 4.6   CO2 17*   *   BUN 69*   CREATININE 3.7*   ALKPHOS 86   ALT 34   AST 57*   BILITOT 1.6*          Assessment/Plan:     * Acute kidney injury superimposed on chronic kidney disease    Cr is increased, 3.5 (7/20), currently 4.1 (7/21)  - Avoid nephrotoxic medications  - Maintain MAP >65  - Hb > 7gm/dL  - CMP daily                    Thank you for your consult. I will follow-up with patient. Please contact us if you have any  additional questions.    Chichi Urbina MD  Nephrology  [facility]

## 2018-07-22 ENCOUNTER — ANESTHESIA EVENT (OUTPATIENT)
Dept: SURGERY | Facility: HOSPITAL | Age: 63
DRG: 005 | End: 2018-07-22
Payer: MEDICARE

## 2018-07-22 ENCOUNTER — TELEPHONE (OUTPATIENT)
Dept: TRANSPLANT | Facility: HOSPITAL | Age: 63
End: 2018-07-22

## 2018-07-22 ENCOUNTER — TELEPHONE (OUTPATIENT)
Dept: TRANSPLANT | Facility: CLINIC | Age: 63
End: 2018-07-22

## 2018-07-22 LAB
ABO + RH BLD: NORMAL
ALBUMIN SERPL BCP-MCNC: 2.6 G/DL
ALBUMIN SERPL BCP-MCNC: 2.6 G/DL
ALP SERPL-CCNC: 90 U/L
ALP SERPL-CCNC: 91 U/L
ALT SERPL W/O P-5'-P-CCNC: 38 U/L
ALT SERPL W/O P-5'-P-CCNC: 39 U/L
ANION GAP SERPL CALC-SCNC: 11 MMOL/L
ANION GAP SERPL CALC-SCNC: 9 MMOL/L
ANISOCYTOSIS BLD QL SMEAR: SLIGHT
ANISOCYTOSIS BLD QL SMEAR: SLIGHT
APTT BLDCRRT: 40.2 SEC
AST SERPL-CCNC: 61 U/L
AST SERPL-CCNC: 63 U/L
BACTERIA #/AREA URNS AUTO: ABNORMAL /HPF
BACTERIA BLD CULT: NORMAL
BACTERIA UR CULT: NO GROWTH
BASOPHILS # BLD AUTO: 0.03 K/UL
BASOPHILS # BLD AUTO: 0.04 K/UL
BASOPHILS NFR BLD: 0.9 %
BASOPHILS NFR BLD: 1.3 %
BILIRUB DIRECT SERPL-MCNC: 0.9 MG/DL
BILIRUB SERPL-MCNC: 1.6 MG/DL
BILIRUB SERPL-MCNC: 2 MG/DL
BILIRUB UR QL STRIP: NEGATIVE
BLD GP AB SCN CELLS X3 SERPL QL: NORMAL
BUN SERPL-MCNC: 73 MG/DL
BUN SERPL-MCNC: 74 MG/DL
BUN SERPL-MCNC: 76 MG/DL
BUN SERPL-MCNC: 79 MG/DL
BURR CELLS BLD QL SMEAR: ABNORMAL
CALCIUM SERPL-MCNC: 9.4 MG/DL
CALCIUM SERPL-MCNC: 9.4 MG/DL
CALCIUM SERPL-MCNC: 9.5 MG/DL
CALCIUM SERPL-MCNC: 9.7 MG/DL
CHLORIDE SERPL-SCNC: 121 MMOL/L
CLARITY UR REFRACT.AUTO: ABNORMAL
CO2 SERPL-SCNC: 17 MMOL/L
CO2 SERPL-SCNC: 18 MMOL/L
COLOR UR AUTO: ABNORMAL
CREAT SERPL-MCNC: 4.1 MG/DL
CREAT SERPL-MCNC: 4.5 MG/DL
CREAT SERPL-MCNC: 4.6 MG/DL
CREAT SERPL-MCNC: 4.7 MG/DL
DIFFERENTIAL METHOD: ABNORMAL
DIFFERENTIAL METHOD: ABNORMAL
EOSINOPHIL # BLD AUTO: 0.2 K/UL
EOSINOPHIL # BLD AUTO: 0.3 K/UL
EOSINOPHIL NFR BLD: 6.7 %
EOSINOPHIL NFR BLD: 8.8 %
ERYTHROCYTE [DISTWIDTH] IN BLOOD BY AUTOMATED COUNT: 21.8 %
ERYTHROCYTE [DISTWIDTH] IN BLOOD BY AUTOMATED COUNT: 21.9 %
EST. GFR  (AFRICAN AMERICAN): 14.3 ML/MIN/1.73 M^2
EST. GFR  (AFRICAN AMERICAN): 14.7 ML/MIN/1.73 M^2
EST. GFR  (AFRICAN AMERICAN): 15.1 ML/MIN/1.73 M^2
EST. GFR  (AFRICAN AMERICAN): 16.9 ML/MIN/1.73 M^2
EST. GFR  (NON AFRICAN AMERICAN): 12.4 ML/MIN/1.73 M^2
EST. GFR  (NON AFRICAN AMERICAN): 12.7 ML/MIN/1.73 M^2
EST. GFR  (NON AFRICAN AMERICAN): 13 ML/MIN/1.73 M^2
EST. GFR  (NON AFRICAN AMERICAN): 14.6 ML/MIN/1.73 M^2
ESTIMATED AVG GLUCOSE: 103 MG/DL
FIBRINOGEN PPP-MCNC: 127 MG/DL
GLUCOSE SERPL-MCNC: 121 MG/DL
GLUCOSE SERPL-MCNC: 158 MG/DL
GLUCOSE SERPL-MCNC: 183 MG/DL
GLUCOSE SERPL-MCNC: 210 MG/DL
GLUCOSE UR QL STRIP: ABNORMAL
HBA1C MFR BLD HPLC: 5.2 %
HCT VFR BLD AUTO: 25 %
HCT VFR BLD AUTO: 25.1 %
HGB BLD-MCNC: 7.8 G/DL
HGB BLD-MCNC: 8.1 G/DL
HGB UR QL STRIP: ABNORMAL
HYALINE CASTS UR QL AUTO: 0 /LPF
HYPOCHROMIA BLD QL SMEAR: ABNORMAL
HYPOCHROMIA BLD QL SMEAR: ABNORMAL
IMM GRANULOCYTES # BLD AUTO: 0.01 K/UL
IMM GRANULOCYTES # BLD AUTO: 0.01 K/UL
IMM GRANULOCYTES NFR BLD AUTO: 0.3 %
IMM GRANULOCYTES NFR BLD AUTO: 0.3 %
INR PPP: 1.7
INR PPP: 1.7
KETONES UR QL STRIP: ABNORMAL
LEUKOCYTE ESTERASE UR QL STRIP: ABNORMAL
LYMPHOCYTES # BLD AUTO: 0.7 K/UL
LYMPHOCYTES # BLD AUTO: 0.8 K/UL
LYMPHOCYTES NFR BLD: 22.2 %
LYMPHOCYTES NFR BLD: 23.6 %
MAGNESIUM SERPL-MCNC: 3.3 MG/DL
MAGNESIUM SERPL-MCNC: 3.4 MG/DL
MCH RBC QN AUTO: 33.1 PG
MCH RBC QN AUTO: 33.6 PG
MCHC RBC AUTO-ENTMCNC: 31.1 G/DL
MCHC RBC AUTO-ENTMCNC: 32.4 G/DL
MCV RBC AUTO: 104 FL
MCV RBC AUTO: 106 FL
MICROSCOPIC COMMENT: ABNORMAL
MONOCYTES # BLD AUTO: 0.6 K/UL
MONOCYTES # BLD AUTO: 0.7 K/UL
MONOCYTES NFR BLD: 20.9 %
MONOCYTES NFR BLD: 21.8 %
NEUTROPHILS # BLD AUTO: 1.4 K/UL
NEUTROPHILS # BLD AUTO: 1.5 K/UL
NEUTROPHILS NFR BLD: 46.5 %
NEUTROPHILS NFR BLD: 46.7 %
NITRITE UR QL STRIP: NEGATIVE
NRBC BLD-RTO: 0 /100 WBC
NRBC BLD-RTO: 0 /100 WBC
OVALOCYTES BLD QL SMEAR: ABNORMAL
OVALOCYTES BLD QL SMEAR: ABNORMAL
PH UR STRIP: 5 [PH] (ref 5–8)
PHOSPHATE SERPL-MCNC: 5.7 MG/DL
PLATELET # BLD AUTO: 29 K/UL
PLATELET # BLD AUTO: 30 K/UL
PLATELET BLD QL SMEAR: ABNORMAL
PMV BLD AUTO: 14.2 FL
PMV BLD AUTO: ABNORMAL FL
POIKILOCYTOSIS BLD QL SMEAR: SLIGHT
POIKILOCYTOSIS BLD QL SMEAR: SLIGHT
POLYCHROMASIA BLD QL SMEAR: ABNORMAL
POTASSIUM SERPL-SCNC: 4.6 MMOL/L
POTASSIUM SERPL-SCNC: 4.6 MMOL/L
POTASSIUM SERPL-SCNC: 4.7 MMOL/L
POTASSIUM SERPL-SCNC: 4.8 MMOL/L
PROT SERPL-MCNC: 5.2 G/DL
PROT SERPL-MCNC: 5.2 G/DL
PROT UR QL STRIP: ABNORMAL
PROTHROMBIN TIME: 17 SEC
PROTHROMBIN TIME: 17.1 SEC
RBC # BLD AUTO: 2.36 M/UL
RBC # BLD AUTO: 2.41 M/UL
RBC #/AREA URNS AUTO: >100 /HPF (ref 0–4)
SODIUM SERPL-SCNC: 148 MMOL/L
SODIUM SERPL-SCNC: 149 MMOL/L
SP GR UR STRIP: 1.02 (ref 1–1.03)
URN SPEC COLLECT METH UR: ABNORMAL
UROBILINOGEN UR STRIP-ACNC: NEGATIVE EU/DL
WBC # BLD AUTO: 3.06 K/UL
WBC # BLD AUTO: 3.26 K/UL
WBC #/AREA URNS AUTO: 11 /HPF (ref 0–5)

## 2018-07-22 PROCEDURE — 20600001 HC STEP DOWN PRIVATE ROOM: Mod: NTX

## 2018-07-22 PROCEDURE — 80053 COMPREHEN METABOLIC PANEL: CPT | Mod: NTX

## 2018-07-22 PROCEDURE — 63600175 PHARM REV CODE 636 W HCPCS: Mod: NTX | Performed by: PHYSICIAN ASSISTANT

## 2018-07-22 PROCEDURE — 85025 COMPLETE CBC W/AUTO DIFF WBC: CPT | Mod: NTX

## 2018-07-22 PROCEDURE — 86920 COMPATIBILITY TEST SPIN: CPT

## 2018-07-22 PROCEDURE — 25000003 PHARM REV CODE 250: Mod: NTX | Performed by: STUDENT IN AN ORGANIZED HEALTH CARE EDUCATION/TRAINING PROGRAM

## 2018-07-22 PROCEDURE — 25000003 PHARM REV CODE 250: Mod: NTX | Performed by: PHYSICIAN ASSISTANT

## 2018-07-22 PROCEDURE — 36415 COLL VENOUS BLD VENIPUNCTURE: CPT | Mod: NTX

## 2018-07-22 PROCEDURE — 82248 BILIRUBIN DIRECT: CPT | Mod: NTX

## 2018-07-22 PROCEDURE — 80053 COMPREHEN METABOLIC PANEL: CPT | Mod: 91,NTX

## 2018-07-22 PROCEDURE — 86850 RBC ANTIBODY SCREEN: CPT | Mod: NTX

## 2018-07-22 PROCEDURE — 80048 BASIC METABOLIC PNL TOTAL CA: CPT | Mod: NTX

## 2018-07-22 PROCEDURE — 87086 URINE CULTURE/COLONY COUNT: CPT | Mod: NTX

## 2018-07-22 PROCEDURE — 83036 HEMOGLOBIN GLYCOSYLATED A1C: CPT | Mod: NTX

## 2018-07-22 PROCEDURE — 85730 THROMBOPLASTIN TIME PARTIAL: CPT | Mod: NTX

## 2018-07-22 PROCEDURE — 99233 SBSQ HOSP IP/OBS HIGH 50: CPT | Mod: NTX,,, | Performed by: INTERNAL MEDICINE

## 2018-07-22 PROCEDURE — 81001 URINALYSIS AUTO W/SCOPE: CPT | Mod: NTX

## 2018-07-22 PROCEDURE — 63600175 PHARM REV CODE 636 W HCPCS: Mod: NTX | Performed by: NURSE PRACTITIONER

## 2018-07-22 PROCEDURE — 25000003 PHARM REV CODE 250: Mod: NTX | Performed by: NURSE PRACTITIONER

## 2018-07-22 PROCEDURE — 85384 FIBRINOGEN ACTIVITY: CPT | Mod: NTX

## 2018-07-22 PROCEDURE — 85610 PROTHROMBIN TIME: CPT | Mod: NTX

## 2018-07-22 PROCEDURE — 93005 ELECTROCARDIOGRAM TRACING: CPT | Mod: NTX

## 2018-07-22 PROCEDURE — 83735 ASSAY OF MAGNESIUM: CPT | Mod: 91,NTX

## 2018-07-22 PROCEDURE — 83735 ASSAY OF MAGNESIUM: CPT | Mod: NTX

## 2018-07-22 PROCEDURE — 25000003 PHARM REV CODE 250: Mod: NTX | Performed by: INTERNAL MEDICINE

## 2018-07-22 PROCEDURE — 84100 ASSAY OF PHOSPHORUS: CPT | Mod: NTX

## 2018-07-22 RX ORDER — HYDROCODONE BITARTRATE AND ACETAMINOPHEN 500; 5 MG/1; MG/1
TABLET ORAL
Status: DISCONTINUED | OUTPATIENT
Start: 2018-07-22 | End: 2018-07-25

## 2018-07-22 RX ORDER — CIPROFLOXACIN 2 MG/ML
400 INJECTION, SOLUTION INTRAVENOUS
Status: DISCONTINUED | OUTPATIENT
Start: 2018-07-22 | End: 2018-07-22

## 2018-07-22 RX ORDER — OCTREOTIDE ACETATE 100 UG/ML
100 INJECTION, SOLUTION INTRAVENOUS; SUBCUTANEOUS EVERY 8 HOURS
Status: DISCONTINUED | OUTPATIENT
Start: 2018-07-22 | End: 2018-07-24

## 2018-07-22 RX ORDER — ONDANSETRON 2 MG/ML
4 INJECTION INTRAMUSCULAR; INTRAVENOUS EVERY 8 HOURS PRN
Status: DISCONTINUED | OUTPATIENT
Start: 2018-07-22 | End: 2018-08-17 | Stop reason: HOSPADM

## 2018-07-22 RX ORDER — DEXTROSE MONOHYDRATE 50 MG/ML
INJECTION, SOLUTION INTRAVENOUS CONTINUOUS
Status: ACTIVE | OUTPATIENT
Start: 2018-07-22 | End: 2018-07-22

## 2018-07-22 RX ORDER — METHYLPREDNISOLONE SODIUM SUCCINATE 500 MG/8ML
500 INJECTION INTRAMUSCULAR; INTRAVENOUS
Status: COMPLETED | OUTPATIENT
Start: 2018-07-22 | End: 2018-07-23

## 2018-07-22 RX ADMIN — ATORVASTATIN CALCIUM 40 MG: 20 TABLET, FILM COATED ORAL at 08:07

## 2018-07-22 RX ADMIN — METOCLOPRAMIDE 10 MG: 5 INJECTION, SOLUTION INTRAMUSCULAR; INTRAVENOUS at 08:07

## 2018-07-22 RX ADMIN — LACTULOSE 200 G: 10 SOLUTION ORAL at 02:07

## 2018-07-22 RX ADMIN — OCTREOTIDE ACETATE 100 MCG: 100 INJECTION, SOLUTION INTRAVENOUS; SUBCUTANEOUS at 10:07

## 2018-07-22 RX ADMIN — OXYCODONE HYDROCHLORIDE AND ACETAMINOPHEN 1000 MG: 500 TABLET ORAL at 08:07

## 2018-07-22 RX ADMIN — OCTREOTIDE ACETATE 100 MCG: 100 INJECTION, SOLUTION INTRAVENOUS; SUBCUTANEOUS at 05:07

## 2018-07-22 RX ADMIN — Medication 220 MG: at 08:07

## 2018-07-22 RX ADMIN — OMEGA-3-ACID ETHYL ESTERS 2 G: 1 CAPSULE, LIQUID FILLED ORAL at 08:07

## 2018-07-22 RX ADMIN — TRIAMCINOLONE ACETONIDE: 1 CREAM TOPICAL at 07:07

## 2018-07-22 RX ADMIN — SODIUM BICARBONATE 650 MG TABLET 1950 MG: at 08:07

## 2018-07-22 RX ADMIN — LACTULOSE 45 G: 20 SOLUTION ORAL at 08:07

## 2018-07-22 RX ADMIN — METOCLOPRAMIDE 10 MG: 5 INJECTION, SOLUTION INTRAMUSCULAR; INTRAVENOUS at 06:07

## 2018-07-22 RX ADMIN — SODIUM BICARBONATE 650 MG TABLET 1950 MG: at 07:07

## 2018-07-22 RX ADMIN — CEFEPIME 1 G: 1 INJECTION, POWDER, FOR SOLUTION INTRAMUSCULAR; INTRAVENOUS at 05:07

## 2018-07-22 RX ADMIN — CEFEPIME 1 G: 1 INJECTION, POWDER, FOR SOLUTION INTRAMUSCULAR; INTRAVENOUS at 06:07

## 2018-07-22 RX ADMIN — LEVETIRACETAM 500 MG: 500 TABLET, FILM COATED ORAL at 08:07

## 2018-07-22 RX ADMIN — OCTREOTIDE ACETATE 100 MCG: 100 INJECTION, SOLUTION INTRAVENOUS; SUBCUTANEOUS at 01:07

## 2018-07-22 RX ADMIN — PANTOPRAZOLE SODIUM 40 MG: 40 TABLET, DELAYED RELEASE ORAL at 07:07

## 2018-07-22 RX ADMIN — PANTOPRAZOLE SODIUM 40 MG: 40 TABLET, DELAYED RELEASE ORAL at 08:07

## 2018-07-22 RX ADMIN — TRIAMCINOLONE ACETONIDE: 1 CREAM TOPICAL at 08:07

## 2018-07-22 RX ADMIN — DOCUSATE SODIUM 100 MG: 100 CAPSULE, LIQUID FILLED ORAL at 08:07

## 2018-07-22 RX ADMIN — SERTRALINE HYDROCHLORIDE 50 MG: 50 TABLET ORAL at 08:07

## 2018-07-22 RX ADMIN — HEPARIN SODIUM 5000 UNITS: 5000 INJECTION, SOLUTION INTRAVENOUS; SUBCUTANEOUS at 05:07

## 2018-07-22 RX ADMIN — LEVETIRACETAM 500 MG: 500 TABLET, FILM COATED ORAL at 07:07

## 2018-07-22 RX ADMIN — LACTULOSE 45 G: 20 SOLUTION ORAL at 01:07

## 2018-07-22 RX ADMIN — FLUCONAZOLE 200 MG: 200 TABLET ORAL at 08:07

## 2018-07-22 RX ADMIN — Medication 220 MG: at 07:07

## 2018-07-22 RX ADMIN — DEXTROSE MONOHYDRATE: 5 INJECTION, SOLUTION INTRAVENOUS at 01:07

## 2018-07-22 RX ADMIN — LACTULOSE 45 G: 20 SOLUTION ORAL at 07:07

## 2018-07-22 RX ADMIN — RIFAXIMIN 550 MG: 550 TABLET ORAL at 07:07

## 2018-07-22 RX ADMIN — RIFAXIMIN 550 MG: 550 TABLET ORAL at 08:07

## 2018-07-22 NOTE — TELEPHONE ENCOUNTER
ORGAN OFFER NOTE    Notified by Ricky Boss, , of liver offer with donor information.  Donor and recipient information read back and verified.  Spoke with Aoln Adamson's wife, pt is currently encephalopathic and admitted to hospital.  Discussed with Dr. Church and identified no acute medical issues during telephone assessment.   Patient's wife verbalized understanding and willingness for patient to receive transplant.

## 2018-07-22 NOTE — SUBJECTIVE & OBJECTIVE
"Interval HPI:   Overnight events:  VS stable.   No further episodes of hypothermia.  Eatin%  Nausea: No  Hypoglycemia and intervention: No  Fever: No  TPN and/or TF: No    BP (!) 103/51 (Patient Position: Lying)   Pulse 74   Temp 98.1 °F (36.7 °C) (Oral)   Resp 16   Ht 6' 2" (1.88 m)   Wt 126.3 kg (278 lb 7.1 oz)   SpO2 (!) 94%   BMI 35.75 kg/m²     Labs Reviewed and Include      Recent Labs  Lab 18  0505   *   CALCIUM 9.4   ALBUMIN 2.6*   PROT 5.2*   *   K 4.6   CO2 18*   *   BUN 74*   CREATININE 4.1*   ALKPHOS 91   ALT 38   AST 63*   BILITOT 2.0*     Lab Results   Component Value Date    WBC 3.06 (L) 2018    HGB 8.1 (L) 2018    HCT 25.0 (L) 2018     (H) 2018    PLT 30 (LL) 2018       Recent Labs  Lab 18  0639   TSH 2.714   FREET4 0.53*     Lab Results   Component Value Date    HGBA1C 5.7 (H) 2018       Nutritional status:   Body mass index is 35.75 kg/m².  Lab Results   Component Value Date    ALBUMIN 2.6 (L) 2018    ALBUMIN 2.6 (L) 2018    ALBUMIN 2.3 (L) 2018     Lab Results   Component Value Date    PREALBUMIN 4 (L) 2018    PREALBUMIN 5 (L) 2018    PREALBUMIN 7 (L) 2016       Estimated Creatinine Clearance: 26.4 mL/min (A) (based on SCr of 4.1 mg/dL (H)).    Accu-Checks  No results for input(s): POCTGLUCOSE in the last 72 hours.    Current Medications and/or Treatments Impacting Glycemic Control  Immunotherapy:  Immunosuppressants     None        Steroids:   Hormones     Start     Stop Route Frequency Ordered    18 1400  octreotide injection 100 mcg      -- SubQ Every 8 hours 18 0950        Pressors:    Autonomic Drugs     None        Hyperglycemia/Diabetes Medications: Antihyperglycemics     None        "

## 2018-07-22 NOTE — PROGRESS NOTES
[facility]  Liver Transplant  Progress Note    Patient Name: Alon Adamson  MRN: [id number]  Admission Date: 6/22/2018  Hospital Length of Stay: 30 days  Code Status: Full Code  Primary Care Provider: Mckinley Vides MD    Subjective:     History of Present Illness:  Mr. Adamson is a 64 yo M with PMH ESLD secondary to ETOH cirrhosis, CKD3, CAD.  Complications of liver disease include hyperbilirubinemia, hypoalbuminemia, severe malnutrition,   hepatic encephalopathy, thrombocytopenia, splenomegaly, ascites, hypervolemia, coagulopathy, portal HTN.  Pt recently admitted for severe hepatic encephalopathy requiring intubation but has been stable from mentation standpoint post resolution of acute enceophalopathy. Mr. Adamson was seen in clinic 6/22 and was noted to have significant hypervolemia, weeping from BLE, and VICKY on CKD3 on routine labs.  His MELD increased from 22 to 26 per labs- coordinator notified and MELD updated.  Cr elevated to 2.3 from baseline of 1.4.  Also with worsening ascites.  Pt reported increased pruritis, generalized fatigue and weakness.  He presented in wheelchair.  ROS otherwise negative.      Hospital Course:  Patient is listed for liver transplant, placed on internal hold 6/25 for HE then status 7 on 6/26 due to change in prescription coverage. He was re-activated on the list 7/3, MELD 22. Pt started on IV diuretics 6/22 and continued 6/23 and 6/24.  BLE edema and ascites signif improved with diuresis and kidney function also improved. During admission, patient had worsening encephalopathy and asterixis prompting infectious work up, all blood and urine cultures obtained during admission with NGTD. Intermittently, encephalopathy severe enough to warrant lactulose enemas. CT head obtained and negative. Paracentesis 6/25 negative for peritonitis per cell count and U/A negative. Pt also with concern for BLE cellulitis- upper legs bright red with lower BLE still with dark  appearance of venous stasis. Placed on vancomycin with improvement in BLE redness 6/26. Vanc continued for 7 days (ended 7/1). His HE waxed and waned for several days requiring lactulose enemas. Pt eating minimally during period of encephalopathy requiring gentle hydration with IVF 6/26. Of note, taco placement attempted 6/26 but unable secondary to agitation upon insertion prompting self resolving nose bleed. Micro lab called 6/26 PM with blood cx + for G+C in blood- pt continued on vanc which was started 6/25. ID consulted, suspect + blood cultures contaminant. EEG obtained 6/26 as pt with h/o serizures and negative. Pt continued on home Keppra. VICKY on admission initially improved with diuresis, but Cr sakina intermittently during admission likely related to aggressive diuresis. Of note, with chest pain overnight 6/26-6/27 that resolved without intervention and pt reported as mild.  EKG NSR with PVC, troponin 0.1 in setting of hypervolemia + VICKY.  Normal dobutamine stress 1/2018.  Cardiology consulted and felt not ACS, no need for further workup other than continue to treat current risk factors for CAD with ASA (pt already on ASA and statin as outpt) + BB for portal hypertension which was started. Repeat paracentesis 7/10, 4.8L off, no SBP. Hypernatremia noted 7/9, nephrology consulted to aid in management. Hypernatremia attributed to dehydration from frequent BM's, diuretics were held, d/c'd creon due to excessive diarrhea, and patient was treated with increasing po free water and IVF. Echo 7/9 with normal EF, no wall abnormality, mild tricuspid/mitral regurg. Was sent to ICU 7/13/18 for lethargy/HE with hypotension and hypothermia. Received lactulose enemas and albumin bolus with improvement in mental status and BPS. Started on BS abx (cefepime/vanc/fluc), although Bcx and Ucx both NGTD.     Interval History: No acute events overnight. Patient more awake. Kidney function continues to decline. KTM recommends  simultaneous liver- kidney transplant. Less nausea and vomiting, CT scan did not show etiology of N/V. Repeat cultures negative to date. Remains on Abx.    Scheduled Meds:   ascorbic acid (vitamin C)  1,000 mg Oral Daily    atorvastatin  40 mg Oral Daily    ceFEPime (MAXIPIME) IVPB  1 g Intravenous Q12H    docusate sodium  100 mg Oral Daily    ergocalciferol  50,000 Units Oral Q7 Days    fluconazole  200 mg Oral Daily    lactulose  45 g Oral TID    levETIRAcetam  500 mg Oral BID    lidocaine  2 patch Transdermal Q24H    octreotide  100 mcg Intravenous Q8H    omega-3 acid ethyl esters  2 g Oral Daily with breakfast    pantoprazole  40 mg Oral BID    rifAXIMin  550 mg Oral BID    sertraline  50 mg Oral Daily    sodium bicarbonate  1,950 mg Oral TID    triamcinolone acetonide 0.1%   Topical (Top) BID    zinc sulfate  220 mg Oral BID     Continuous Infusions:   dextrose 5 % 50 mL/hr at 07/22/18 1328     PRN Meds:sodium chloride, sodium chloride, acetaminophen, diphenhydrAMINE-zinc acetate 1-0.1%, lactulose, metoclopramide HCl, ondansetron, sodium chloride 0.9%    Review of Systems   Constitutional: Negative.    HENT: Negative.    Eyes: Negative.    Respiratory: Negative.    Cardiovascular: Negative.    Gastrointestinal: Negative.    Genitourinary: Negative.    Musculoskeletal: Negative.    Skin: Negative.    Neurological: Negative.    Psychiatric/Behavioral: Negative.      Objective:     Vital Signs (Most Recent):  Temp: 98 °F (36.7 °C) (07/22/18 1112)  Pulse: 75 (07/22/18 1112)  Resp: 16 (07/22/18 1112)  BP: (!) 110/54 (07/22/18 1112)  SpO2: 97 % (07/22/18 1112) Vital Signs (24h Range):  Temp:  [96.3 °F (35.7 °C)-98.3 °F (36.8 °C)] 98 °F (36.7 °C)  Pulse:  [66-76] 75  Resp:  [16-20] 16  SpO2:  [94 %-100 %] 97 %  BP: (101-110)/(49-54) 110/54     Weight: 126.3 kg (278 lb 7.1 oz)  Body mass index is 35.75 kg/m².    Intake/Output - Last 3 Shifts       07/20 0700 - 07/21 0659 07/21 0700 - 07/22 0659 07/22  0700 - 07/23 0659    P.O. 2100 3285     I.V. (mL/kg) 100 (0.8)      Total Intake(mL/kg) 2200 (17.4) 3285 (26)     Urine (mL/kg/hr) 430 (0.1) 350 (0.1) 90 (0.1)    Stool 4 (0)      Total Output 434 350 90    Net +1766 +2935 -90           Stool Occurrence 6 x 8 x     Emesis Occurrence  1 x           Physical Exam   Constitutional: He is oriented to person, place, and time. He appears well-developed and well-nourished.   HENT:   Head: Normocephalic and atraumatic.   Eyes: Conjunctivae and EOM are normal. Pupils are equal, round, and reactive to light. No scleral icterus.   Neck: Normal range of motion. Neck supple. No thyromegaly present.   Cardiovascular: Normal rate, regular rhythm and normal heart sounds.    Pulmonary/Chest: Effort normal and breath sounds normal. He has no rales.   Abdominal: Soft. Bowel sounds are normal. He exhibits no distension and no mass. There is no tenderness.   Musculoskeletal: Normal range of motion. He exhibits no edema.   Neurological: He is alert and oriented to person, place, and time.   Skin: Skin is warm and dry. No rash noted.   Psychiatric: He has a normal mood and affect.   Vitals reviewed.      Laboratory:  Immunosuppressants     None        CBC:   Recent Labs  Lab 07/22/18  0505   WBC 3.06*   RBC 2.41*   HGB 8.1*   HCT 25.0*   PLT 30*   *   MCH 33.6*   MCHC 32.4     CMP:   Recent Labs  Lab 07/22/18  0505 07/22/18  1320   * 210*   CALCIUM 9.4 9.4   ALBUMIN 2.6*  --    PROT 5.2*  --    * 149*   K 4.6 4.8   CO2 18* 17*   * 121*   BUN 74* 73*   CREATININE 4.1* 4.5*   ALKPHOS 91  --    ALT 38  --    AST 63*  --    BILITOT 2.0*  --      Coagulation:   Recent Labs  Lab 07/22/18  0505   INR 1.7*     Labs within the past 24 hours have been reviewed.    Diagnostic Results:  I have personally reviewed all pertinent imaging studies.    Assessment/Plan:     * Acute kidney injury superimposed on chronic kidney disease    -With CKD3   -VICKY initially improving with  "albumin infusion but with minimal intake 6/25 and 6/26.  - nephrology consulted due to hypervolemia and multiple electrolyte abnormalities (acidosis, hypernatremia)  - Creatinine continues to worsen, likely to need RRT in the near future if doesn't improve. Discussed with Nephrology. Plan for lasix trial per Nephrology. Give IV Lasix 80 mg. Monitor.         Hypothermia    - with episodes of hypothermia on 7/17 and 7/18.   - BS antibxs on board for prophylaxis.   - blood cultures NGTD.   - ID consulted for further management.           CKD (chronic kidney disease), stage III    See VICKY. KTM consulted to assess need for kidney transplant.             Severe protein-calorie malnutrition    - Dietary consulted for calorie count.  - Pt eating better accord to family.  Does take boost supplement.  - "pre-hab" regimen and supplements ordered. Dc creon due to excessive diarrhea  - Patient drinking at least 2-3 boost supplements and beneprotein daily.   - If continues to have low intake, consider starting Megace.           Physical deconditioning    - PT/OT consulted.    - Encourage OOBTC for several hours each day, walking with walker with PT        Ascites due to alcoholic cirrhosis    - Para 6/25 negative for infection   - Para 7/10 negative for infection. Monitor.           Coagulopathy    - Secondary to decompensated liver disease.         Cholestatic pruritus    - cholestyramine has previously been ineffective, given depressed mood.   - Zoloft for mood and pruritus.        Acidosis    Continue oral bicarb replacement. Monitor with daily labs.           Hypoalbuminemia due to protein-calorie malnutrition    - see severe protein-calorie malnutrition         Pancytopenia    - related to decompensated liver disease         Decompensated hepatic cirrhosis    - Placed status 7 on 6/26 for prescription drug coverage change. Re-activated 7/3. Back on hold d/t frailty and decompensation at the end of last week prompting ICU " transfer. Now off hold 7/17.  Plan to submit MELD exception points. Will remeld today at 27.       MELD-Na score: 27 at 7/21/2018  4:01 AM  MELD score: 27 at 7/21/2018  4:01 AM  Calculated from:  Serum Creatinine: 3.7 mg/dL at 7/21/2018  4:00 AM  Serum Sodium: 147 mmol/L (Rounded to 137) at 7/21/2018  4:00 AM  Total Bilirubin: 1.6 mg/dL at 7/21/2018  4:00 AM  INR(ratio): 1.7 at 7/21/2018  4:01 AM  Age: 63 years        Coronary artery disease involving native coronary artery of native heart without angina pectoris    - Continue home ASA.   - 2D Echo 7/9 normal EF (55-60%), trivial tricuspid/mitral regurg.        Anemia of chronic disease    - H&H decreased on 7/18 and one unit of PRBCs given for replacement at that time.   - With good response to transfusion.   - H/H stable. Continue to monitor.          Thrombocytopenia    - Secondary to splenomegaly from portal HTN- should improve with txp.  - No s/s overt bleed.        Seizures    - Oral Keppra transitioned to IV as pt not able to swallow 6/26.  - Resumed oral Keppra since mentation improved.  - EEG 6/26 negative.        Bilateral edema of lower extremity    - Completed vanc x 7 days for BLE cellulitis  - 2D Echo 7/9 normal EF (55-60%), trivial tricuspid/mitral regurg.  - Vanc now restarted given recent episodes of hypothermia.   -Started trial of lasix per Nephrology.         Hepatic encephalopathy    - ESLD secondary to EtOH cirrhosis with severe complications (hyperbilirubinemia, hypoalbuminemia, severe malnutrition, hepatic encephalopathy, thrombocytopenia, splenomegaly, ascites, hypervolemia, coaguloopathy, portal HTN), seizure disorder on Keppra, CKD3, CAD   - has frequent episodes of lethargy/confusion/slowing when holding lactulose   - admitted to SICU 7/13/18 for worsening encephalopathy and hypothermia  - PO lactulose TID, PRN lactulose enemas, Rifaximin  - sleepy this AM. Given lactulose enema with improvement in mentation. Continue oral lactulose and  PRN lactulose enemas for HE control.               VTE Risk Mitigation         Ordered     IP VTE HIGH RISK PATIENT  Once      06/22/18 1502          The patients clinical status was discussed at multidisplinary rounds, involving transplant surgery, transplant medicine, pharmacy, nursing, nutrition, and social work    Discharge Planning:  No Patient Care Coordination Note on file.      Cecily Church MD  Liver Transplant  [facility]

## 2018-07-22 NOTE — PLAN OF CARE
Problem: Patient Care Overview  Goal: Plan of Care Review  Outcome: Ongoing (interventions implemented as appropriate)  * AAO x 4 delayed responses noted (at baseline).   * Abdomen distended and soft.  * Bowel sound noted as hyperactive. Patient is incontinent of stool. One bowel movements thus far dark brown stool noted  * 2 gm Na mechanical soft diet patient tolerating well. See chart for % eaten. Patient took medications very well tonight  * Generalized ecchymosis noted   * Edema noted to the bilateral lower extremities +4 pitting. Generalized edema noted. +3 edema noted to the scrotum.  * Bed at lowest position and locked, call light within reach, non-slip socks on, wife and son at bedside, and side rails up x 2.  Encouraged patient to call for assistance when needed patient and family stated understanding.   * Right upper arm midline (7/20/18) patent, dressing clean dry and intact no signs or symptoms of infection noted. Dressing due to be changed on 7/27/18, site to be changed on 8/17/18.  * Slight jundice noted the skin & sclera.  * Patient has no complaints of nausea PRN medication administered prior to evening medications to prevent nausea.  * Oxygen saturation 98 % on room air.  Respirations even and unlabored no signs or symptoms of distress noted.  * Patient has no complaints of pain at this time.  * Dermatitis/skin tear noted to the buttock barrier applied foam clean dry and intact.   * Standard precautions maintained.  * Turning every two hours see flow sheet for specific documentation.   * [place] catheter (7/13/18) patent and intact secured to leg with securment device. Cloudy lynne urine noted see flow sheet for intake and output totals.  * BMP every 8 hours see chart for results no change from previous.

## 2018-07-22 NOTE — TELEPHONE ENCOUNTER
PATIENT NAME: Alon Muller [facility] #: [id number]    Lab Results   Component Value Date    CREATININE 4.1 (H) 07/22/2018     (H) 07/22/2018    BILITOT 2.0 (H) 07/22/2018    ALBUMIN 2.6 (L) 07/22/2018    INR 1.7 (H) 07/22/2018       Encephalopathy: 3 - 4  Ascites: moderate  Dialysis: no     Recertification requestor: Cecily Church

## 2018-07-22 NOTE — PLAN OF CARE
Pt is AAOx4, but later became lethargic and required a lactulose enema in the afternoon. Pt has had several large BMs since then. He is now back to baseline. Vitals and temperature are stable. Urine is still dark brown with visible blood. Sacral wound covered with foam dressing. OR time is set for 4:30 in the morning. EKG, CXR, labs, consents, done. Family has been at bedside, attentive to pt.

## 2018-07-22 NOTE — ANESTHESIA PREPROCEDURE EVALUATION
07/22/2018  [facility]  Anesthesia Pre-Operative Evaluation         Patient Name: Alon Adamson  YOB: 1955  MRN: [id number]    SUBJECTIVE:     Pre-operative evaluation for Procedure(s) (LRB):  TRANSPLANT, LIVER (N/A)     07/22/2018    Alon Adamson is a 63 y.o. male w/ a significant PMHx Mr. Adamson is a 64 yo M with PMH ESLD secondary to ETOH cirrhosis, CKD3, CAD.  Complications of liver disease include hyperbilirubinemia, hypoalbuminemia, severe malnutrition,   hepatic encephalopathy, thrombocytopenia, splenomegaly, ascites, hypervolemia, coagulopathy, portal HTN.  Pt recently admitted for severe hepatic encephalopathy requiring intubation but has been stable from mentation standpoint post resolution of acute enceophalopathy. Mr. Adamson was seen in clinic 6/22 and was noted to have significant hypervolemia, weeping from BLE, and VICKY on CKD3 on routine labs.  His MELD increased from 22 to 26 per labs- coordinator notified and MELD updated.  Cr elevated to 2.3 from baseline of 1.4.  Also with worsening ascites.  Pt reported increased pruritis, generalized fatigue and weakness.  He presented in wheelchair.  ROS otherwise negative.       Hospital Course:  Patient is listed for liver transplant, placed on internal hold 6/25 for HE then status 7 on 6/26 due to change in prescription coverage. He was re-activated on the list 7/3, MELD 22. Pt started on IV diuretics 6/22 and continued 6/23 and 6/24.  BLE edema and ascites signif improved with diuresis and kidney function also improved. During admission, patient had worsening encephalopathy and asterixis prompting infectious work up, all blood and urine cultures obtained during admission with NGTD. Intermittently, encephalopathy severe enough to warrant lactulose enemas. CT head obtained and negative. Paracentesis 6/25  negative for peritonitis per cell count and U/A negative. Pt also with concern for BLE cellulitis- upper legs bright red with lower BLE still with dark appearance of venous stasis. Placed on vancomycin with improvement in BLE redness 6/26. Vanc continued for 7 days (ended 7/1). His HE waxed and waned for several days requiring lactulose enemas. Pt eating minimally during period of encephalopathy requiring gentle hydration with IVF 6/26. Of note, taco placement attempted 6/26 but unable secondary to agitation upon insertion prompting self resolving nose bleed. Micro lab called 6/26 PM with blood cx + for G+C in blood- pt continued on vanc which was started 6/25. ID consulted, suspect + blood cultures contaminant. EEG obtained 6/26 as pt with h/o serizures and negative. Pt continued on home Keppra. VICKY on admission initially improved with diuresis, but Cr sakina intermittently during admission likely related to aggressive diuresis. Of note, with chest pain overnight 6/26-6/27 that resolved without intervention and pt reported as mild.  EKG NSR with PVC, troponin 0.1 in setting of hypervolemia + VICKY.  Normal dobutamine stress 1/2018.  Cardiology consulted and felt not ACS, no need for further workup other than continue to treat current risk factors for CAD with ASA (pt already on ASA and statin as outpt) + BB for portal hypertension which was started. Repeat paracentesis 7/10, 4.8L off, no SBP. Hypernatremia noted 7/9, nephrology consulted to aid in management. Hypernatremia attributed to dehydration from frequent BM's, diuretics were held, d/c'd creon due to excessive diarrhea, and patient was treated with increasing po free water and IVF. Echo 7/9 with normal EF, no wall abnormality, mild tricuspid/mitral regurg. Was sent to ICU 7/13/18 for lethargy/HE with hypotension and hypothermia. Received lactulose enemas and albumin bolus with improvement in mental status and BPS. Started on BS abx (cefepime/vanc/fluc), although  Bcx and Ucx both NGTD.     He is now scheduled for above procedure.     MELD-Na score: 28 at 7/22/2018  1:20 PM  MELD score: 28 at 7/22/2018  1:20 PM  Calculated from:  Serum Creatinine: 4.5 mg/dL (Rounded to 4) at 7/22/2018  1:20 PM  Serum Sodium: 149 mmol/L (Rounded to 137) at 7/22/2018  1:20 PM  Total Bilirubin: 2 mg/dL at 7/22/2018  5:05 AM  INR(ratio): 1.7 at 7/22/2018  5:05 AM  Age: 63 years    LDA:       Midline Catheter Insertion/Assessment  - Single Lumen 07/20/18 1128 Right cephalic vein (lateral side of arm) 18g x 10cm   Midline Catheter Insertion/Assessment - [employer] ([employer]) Placement Date/Time: 07/20/18 1128 Present Prior to Hospital Arrival?: No Hand Hygiene: Performed Barrier Precautions: Performed Skin Antisepsis: ChloraPrep Device: Bard Side: Right Location: cephalic vein (lateral side of arm) Size: 18g x 10...          Urethral Catheter 07/21/18 1706   Urethral Catheter Properties Placement Date/Time: 07/21/18 1706 Hand Hygiene: Performed Inserted by: RN Insertion attempts (enter comment if more than 2 attempts): 1           Prev airway: Indication: airway protection  Pre-oxygenation. Induction: intravenous, mask ventilation: easy with oral airway.  Intubation: postinduction, laryngoscopy glidescope  Endotracheal Tube: oral, 7.0 mm ID, cuffed (inflated to minimal occlusive pressure)  Attempts: 1, Grade I - full view of cords  Complicating Factors: none  Tube secured at 23 cm at the lips.  Findings post-intubation: bilateral breath sounds, atraumatic / condition of teeth unchanged, positive ETCO2  Position Confirmation: auscultation    Drips:    dextrose 5 % 50 mL/hr at 07/22/18 1328       Patient Active Problem List   Diagnosis    Hepatic encephalopathy    Bilateral edema of lower extremity    Seizures    Thrombocytopenia    Anemia of chronic disease    Coronary artery disease involving native coronary artery of native heart without angina pectoris    Decompensated hepatic  cirrhosis    Acute kidney injury superimposed on chronic kidney disease    Alteration in skin integrity    Pancytopenia    Preoperative cardiovascular examination    Alcoholic cirrhosis    Hypoalbuminemia due to protein-calorie malnutrition    Acidosis    Cholestatic pruritus    Coagulopathy    Ascites due to alcoholic cirrhosis    Physical deconditioning    Severe protein-calorie malnutrition    Liver cirrhosis    Stage II pressure ulcer of sacral region    CKD (chronic kidney disease), stage III    Hypothermia    Organ transplant candidate    Chronic venous hypertension (idiopathic) with inflammation of bilateral lower extremity    Nausea and vomiting       Review of patient's allergies indicates:   Allergen Reactions    Nsaids (non-steroidal anti-inflammatory drug)      D/t to liver disease.    Tylenol [acetaminophen]      D/t liver disease.    Penicillins Other (See Comments)     Tolerated pip-tazo in 8/2016 without issue  Since childhood was told not to take it       Current Inpatient Medications:   ascorbic acid (vitamin C)  1,000 mg Oral Daily    atorvastatin  40 mg Oral Daily    ceFEPime (MAXIPIME) IVPB  1 g Intravenous Q12H    docusate sodium  100 mg Oral Daily    ergocalciferol  50,000 Units Oral Q7 Days    fluconazole  200 mg Oral Daily    lactulose  45 g Oral TID    levETIRAcetam  500 mg Oral BID    lidocaine  2 patch Transdermal Q24H    octreotide  100 mcg Intravenous Q8H    omega-3 acid ethyl esters  2 g Oral Daily with breakfast    pantoprazole  40 mg Oral BID    rifAXIMin  550 mg Oral BID    sertraline  50 mg Oral Daily    sodium bicarbonate  1,950 mg Oral TID    triamcinolone acetonide 0.1%   Topical (Top) BID    zinc sulfate  220 mg Oral BID       No current facility-administered medications on file prior to encounter.      Current Outpatient Prescriptions on File Prior to Encounter   Medication Sig Dispense Refill    ascorbic acid (VITAMIN C) 1000 MG tablet  Take 1,000 mg by mouth once daily.      aspirin (ECOTRIN) 81 MG EC tablet Take 1 tablet (81 mg total) by mouth once daily. 90 tablet 3    atorvastatin (LIPITOR) 40 MG tablet TAKE 1 TABLET(40 MG) BY MOUTH EVERY DAY 30 tablet 0    docusate sodium (COLACE) 100 MG capsule Take 100 mg by mouth once daily.       ferrous gluconate 270 mg (27 mg iron) Tab Take 1 tablet by mouth once daily.      fish oil-omega-3 fatty acids 300-1,000 mg capsule Take 2 capsules (2 g total) by mouth once daily. (Patient taking differently: Take 2 g by mouth 2 (two) times daily. )      furosemide (LASIX) 80 MG tablet Take 1 tablet (80 mg total) by mouth 2 (two) times daily. 60 tablet 2    GENERLAC 10 gram/15 mL solution Take 60 mLs (40 g total) by mouth 3 (three) times daily. 60mg three times per day (Patient taking differently: Take 60 mLs by mouth 3 (three) times daily. ) 5400 mL 6    levETIRAcetam (KEPPRA) 500 MG Tab Take 1 tablet (500 mg total) by mouth 2 (two) times daily. 180 tablet 1    magnesium oxide (MAG-OX) 400 mg tablet Take 1 tablet (400 mg total) by mouth once daily.  0    ondansetron (ZOFRAN) 4 MG tablet Take 1 tablet (4 mg total) by mouth every 8 (eight) hours as needed for Nausea. 15 tablet 3    pantoprazole (PROTONIX) 40 MG tablet Take 1 tablet (40 mg total) by mouth 2 (two) times daily before meals. 60 tablet 11    rifAXIMin (XIFAXAN) 550 mg Tab Take 1 tablet (550 mg total) by mouth 2 (two) times daily. 60 tablet 5    sodium bicarbonate 650 MG tablet Take 1 tablet (650 mg total) by mouth 2 (two) times daily. 60 tablet 11    spironolactone (ALDACTONE) 100 MG tablet Take 2 tablets (200 mg total) by mouth 2 (two) times daily. 60 tablet 2    tramadol (ULTRAM) 50 mg tablet Take 50 mg by mouth every 6 (six) hours as needed for Pain.         Past Surgical History:   Procedure Laterality Date    APPENDECTOMY      Open    BACK SURGERY      CHOLECYSTECTOMY      laprascopic    COLONOSCOPY N/A 1/25/2018    Procedure:  COLONOSCOPY;  Surgeon: Lashawn Myles MD;  Location: [facility] ([address] [place] [address]);  Service: Endoscopy;  Laterality: N/A;    LUMBAR DISCECTOMY      SPLENIC ARTERY EMBOLIZATION  04/08/2016    Dr Taveras    TONSILLECTOMY         Social History     Social History    Marital status:      Spouse name: N/A    Number of children: N/A    Years of education: N/A     Occupational History    Not on file.     Social History Main Topics    Smoking status: Never Smoker    Smokeless tobacco: Current User     Types: Chew    Alcohol use No    Drug use: No    Sexual activity: Not on file     Other Topics Concern    Not on file     Social History Narrative    No narrative on file       OBJECTIVE:     Vital Signs Range (Last 24H):  Temp:  [36.2 °C (97.2 °F)-37.1 °C (98.8 °F)]   Pulse:  [71-76]   Resp:  [16-20]   BP: (101-110)/(49-54)   SpO2:  [94 %-98 %]       CBC:   Recent Labs      07/21/18   0401  07/21/18   0631  07/22/18   0505   WBC  2.71*   --   3.06*   RBC  2.07*   --   2.41*   HGB  7.2*  7.3*  8.1*   HCT  22.0*  22.8*  25.0*   PLT  28*   --   30*   MCV  106*   --   104*   MCH  34.8*   --   33.6*   MCHC  32.7   --   32.4       CMP:   Recent Labs      07/21/18   0400   07/22/18   0505  07/22/18   1320   NA  147*   < >  148*  149*   K  4.6   < >  4.6  4.8   CL  119*   < >  121*  121*   CO2  17*   < >  18*  17*   BUN  69*   < >  74*  73*   CREATININE  3.7*   < >  4.1*  4.5*   GLU  156*   < >  121*  210*   MG  3.3*   --   3.3*   --    PHOS  5.7*   --   5.7*   --    CALCIUM  9.4   < >  9.4  9.4   ALBUMIN  2.6*   --   2.6*   --    PROT  5.0*   --   5.2*   --    ALKPHOS  86   --   91   --    ALT  34   --   38   --    AST  57*   --   63*   --    BILITOT  1.6*   --   2.0*   --     < > = values in this interval not displayed.       INR:  Recent Labs      07/20/18   0639  07/21/18   0401  07/22/18   0505   INR  1.6*  1.7*  1.7*       Diagnostic Studies: No relevant studies.    EKG:   Sinus rhythm with occasional Premature  ventricular complexes  Prolonged QT  Abnormal ECG  When compared with ECG of 26-JAN-2018 13:18,  base artifact was present and rhythm uncertain  Confirmed by SRINIVASAN DONALD MD ([id number]) on 6/27/2018 5:07:58 PM    2D ECHO:  Results for orders placed or performed during the hospital encounter of 06/22/18   2D echo with color flow doppler   Result Value Ref Range    EF 55 55 - 65    Mitral Valve Regurgitation TRIVIAL     Diastolic Dysfunction No     Est. PA Systolic Pressure 22.36     Mitral Valve Mobility NORMAL     Tricuspid Valve Regurgitation TRIVIAL          ASSESSMENT/PLAN:         Anesthesia Evaluation    I have reviewed the Patient Summary Reports.     I have reviewed the Medications.     Review of Systems  Anesthesia Hx:  No problems with previous Anesthesia  History of prior surgery of interest to airway management or planning: Previous anesthesia: General Denies Family Hx of Anesthesia complications.   Denies Personal Hx of Anesthesia complications.   Social:  Former Smoker    Hematology/Oncology:         -- Anemia:   Cardiovascular:   Exercise tolerance: poor Hypertension CAD  CABG/stent  2 cardiac stents    Recent R HCath shows nl PAP    Bed bound   Pulmonary:   Sleep Apnea    Renal/:   Chronic Renal Disease (CKD III)    Hepatic/GI:   Liver Disease, (ESLD)    Neurological:   Seizures    Endocrine:   Diabetes        Physical Exam  General:  Large Beard, Obesity    Airway/Jaw/Neck:  Airway Findings: Mouth Opening: Normal Tongue: Normal  General Airway Assessment: Adult, Possible difficult mask airway, Possible difficult intubation, Average  Mallampati: IV  TM Distance: Normal, at least 6 cm  Jaw/Neck Findings:  Neck ROM: Normal ROM      Dental:  Dental Findings: In tact   Chest/Lungs:  Chest/Lungs Findings: Normal Respiratory Rate     Heart/Vascular:  Heart Findings: Normal Heart murmur: negative    Abdomen:  Abdomen Findings: Normal      Mental Status:  Mental Status Findings:  Cooperative, Alert and  Oriented         Anesthesia Plan  Type of Anesthesia, risks & benefits discussed:  Anesthesia Type:  general  Patient's Preference:   Intra-op Monitoring Plan: arterial line, central line, standard ASA monitors and cardiac output  Intra-op Monitoring Plan Comments:   Post Op Pain Control Plan:   Post Op Pain Control Plan Comments:   Induction:   IV  Beta Blocker:  Patient is not currently on a Beta-Blocker (No further documentation required).       Informed Consent: Patient understands risks and agrees with Anesthesia plan.  Questions answered. Anesthesia consent signed with patient.  ASA Score: 4  emergent   Day of Surgery Review of History & Physical:    H&P update referred to the surgeon.         Ready For Surgery From Anesthesia Perspective.

## 2018-07-22 NOTE — SUBJECTIVE & OBJECTIVE
Scheduled Meds:   ascorbic acid (vitamin C)  1,000 mg Oral Daily    atorvastatin  40 mg Oral Daily    ceFEPime (MAXIPIME) IVPB  1 g Intravenous Q12H    docusate sodium  100 mg Oral Daily    ergocalciferol  50,000 Units Oral Q7 Days    fluconazole  200 mg Oral Daily    lactulose  45 g Oral TID    levETIRAcetam  500 mg Oral BID    lidocaine  2 patch Transdermal Q24H    octreotide  100 mcg Intravenous Q8H    omega-3 acid ethyl esters  2 g Oral Daily with breakfast    pantoprazole  40 mg Oral BID    rifAXIMin  550 mg Oral BID    sertraline  50 mg Oral Daily    sodium bicarbonate  1,950 mg Oral TID    triamcinolone acetonide 0.1%   Topical (Top) BID    zinc sulfate  220 mg Oral BID     Continuous Infusions:   dextrose 5 % 50 mL/hr at 07/22/18 1328     PRN Meds:sodium chloride, sodium chloride, acetaminophen, diphenhydrAMINE-zinc acetate 1-0.1%, lactulose, metoclopramide HCl, ondansetron, sodium chloride 0.9%    Review of Systems   Constitutional: Negative.    HENT: Negative.    Eyes: Negative.    Respiratory: Negative.    Cardiovascular: Negative.    Gastrointestinal: Negative.    Genitourinary: Negative.    Musculoskeletal: Negative.    Skin: Negative.    Neurological: Negative.    Psychiatric/Behavioral: Negative.      Objective:     Vital Signs (Most Recent):  Temp: 98 °F (36.7 °C) (07/22/18 1112)  Pulse: 75 (07/22/18 1112)  Resp: 16 (07/22/18 1112)  BP: (!) 110/54 (07/22/18 1112)  SpO2: 97 % (07/22/18 1112) Vital Signs (24h Range):  Temp:  [96.3 °F (35.7 °C)-98.3 °F (36.8 °C)] 98 °F (36.7 °C)  Pulse:  [66-76] 75  Resp:  [16-20] 16  SpO2:  [94 %-100 %] 97 %  BP: (101-110)/(49-54) 110/54     Weight: 126.3 kg (278 lb 7.1 oz)  Body mass index is 35.75 kg/m².    Intake/Output - Last 3 Shifts       07/20 0700 - 07/21 0659 07/21 0700 - 07/22 0659 07/22 0700 - 07/23 0659    P.O. 2100 3285     I.V. (mL/kg) 100 (0.8)      Total Intake(mL/kg) 2200 (17.4) 3285 (26)     Urine (mL/kg/hr) 430 (0.1) 350 (0.1) 90  (0.1)    Stool 4 (0)      Total Output 434 350 90    Net +1766 +2935 -90           Stool Occurrence 6 x 8 x     Emesis Occurrence  1 x           Physical Exam   Constitutional: He is oriented to person, place, and time. He appears well-developed and well-nourished.   HENT:   Head: Normocephalic and atraumatic.   Eyes: Conjunctivae and EOM are normal. Pupils are equal, round, and reactive to light. No scleral icterus.   Neck: Normal range of motion. Neck supple. No thyromegaly present.   Cardiovascular: Normal rate, regular rhythm and normal heart sounds.    Pulmonary/Chest: Effort normal and breath sounds normal. He has no rales.   Abdominal: Soft. Bowel sounds are normal. He exhibits no distension and no mass. There is no tenderness.   Musculoskeletal: Normal range of motion. He exhibits no edema.   Neurological: He is alert and oriented to person, place, and time.   Skin: Skin is warm and dry. No rash noted.   Psychiatric: He has a normal mood and affect.   Vitals reviewed.      Laboratory:  Immunosuppressants     None        CBC:   Recent Labs  Lab 07/22/18  0505   WBC 3.06*   RBC 2.41*   HGB 8.1*   HCT 25.0*   PLT 30*   *   MCH 33.6*   MCHC 32.4     CMP:   Recent Labs  Lab 07/22/18  0505 07/22/18  1320   * 210*   CALCIUM 9.4 9.4   ALBUMIN 2.6*  --    PROT 5.2*  --    * 149*   K 4.6 4.8   CO2 18* 17*   * 121*   BUN 74* 73*   CREATININE 4.1* 4.5*   ALKPHOS 91  --    ALT 38  --    AST 63*  --    BILITOT 2.0*  --      Coagulation:   Recent Labs  Lab 07/22/18  0505   INR 1.7*     Labs within the past 24 hours have been reviewed.    Diagnostic Results:  I have personally reviewed all pertinent imaging studies.

## 2018-07-22 NOTE — ASSESSMENT & PLAN NOTE
Pt's temp is within normal limits. No further episodes of hypothermia.    Pt's current thyroid function is less likely to be a cause for hypothermia.   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   Would repeat TFTs on Monday.     AM Cortisol level is within normal limits, suggesting normal adrenal function. Thus it is less likely that adrenal insuffiencey would be causing hypothermia. However if pt becomes symptomatic requiring pressors, would consider adding hydrocortisone.

## 2018-07-22 NOTE — PROGRESS NOTES
"[facility]  Endocrinology  Progress Note    Admit Date: 2018     64 yo male with diagnosis of ESLD secondary to EtOH cirrhosis with severe complications (hyperbilirubinemia, hypoalbuminemia, severe malnutrition, hepatic encephalopathy, thrombocytopenia, splenomegaly, ascites, hypervolemia, coaguloopathy, portal HTN), seizure disorder on Keppra, CKD, CAD, is currently being treated for decompensated liver disease. Hospital course was complicated by hepatic encephalopathy. During the course, he has also had few episodes of hypothermia with temp as low as 93 F. He completed 7 day course of vancomycin for cellilitis, and is currently on vancomycin and cefepime for possible sepsis. However he continues to be hypothermic.   Further work-up of hypothermia was done, which showed TSH: 2.714, FT4: 0.53, AM cortisol: 9.9.    Interval HPI:   Overnight events:  VS stable.   No further episodes of hypothermia.  Eatin%  Nausea: No  Hypoglycemia and intervention: No  Fever: No  TPN and/or TF: No    BP (!) 103/51 (Patient Position: Lying)   Pulse 74   Temp 98.1 °F (36.7 °C) (Oral)   Resp 16   Ht 6' 2" (1.88 m)   Wt 126.3 kg (278 lb 7.1 oz)   SpO2 (!) 94%   BMI 35.75 kg/m²       Labs Reviewed and Include      Recent Labs  Lab 18  0505   *   CALCIUM 9.4   ALBUMIN 2.6*   PROT 5.2*   *   K 4.6   CO2 18*   *   BUN 74*   CREATININE 4.1*   ALKPHOS 91   ALT 38   AST 63*   BILITOT 2.0*     Lab Results   Component Value Date    WBC 3.06 (L) 2018    HGB 8.1 (L) 2018    HCT 25.0 (L) 2018     (H) 2018    PLT 30 (LL) 2018       Recent Labs  Lab 18  0639   TSH 2.714   FREET4 0.53*     Lab Results   Component Value Date    HGBA1C 5.7 (H) 2018       Nutritional status:   Body mass index is 35.75 kg/m².  Lab Results   Component Value Date    ALBUMIN 2.6 (L) 2018    ALBUMIN 2.6 (L) 2018    ALBUMIN 2.3 (L) 2018     Lab " Results   Component Value Date    PREALBUMIN 4 (L) 06/30/2018    PREALBUMIN 5 (L) 01/22/2018    PREALBUMIN 7 (L) 08/31/2016       Estimated Creatinine Clearance: 26.4 mL/min (A) (based on SCr of 4.1 mg/dL (H)).    Accu-Checks  No results for input(s): POCTGLUCOSE in the last 72 hours.    Current Medications and/or Treatments Impacting Glycemic Control  Immunotherapy:  Immunosuppressants     None        Steroids:   Hormones     Start     Stop Route Frequency Ordered    07/20/18 1400  octreotide injection 100 mcg      -- SubQ Every 8 hours 07/20/18 0950        Pressors:    Autonomic Drugs     None        Hyperglycemia/Diabetes Medications: Antihyperglycemics     None          ASSESSMENT and PLAN    Hypothermia    Pt's temp is within normal limits. No further episodes of hypothermia.    Pt's current thyroid function is less likely to be a cause for hypothermia.   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   Would repeat TFTs on Monday.     AM Cortisol level is within normal limits, suggesting normal adrenal function. Thus it is less likely that adrenal insuffiencey would be causing hypothermia. However if pt becomes symptomatic requiring pressors, would consider adding hydrocortisone.         Hepatic encephalopathy    Management as per primary team              Rubina Paul MD  Endocrinology  [facility]

## 2018-07-23 ENCOUNTER — ANESTHESIA (OUTPATIENT)
Dept: SURGERY | Facility: HOSPITAL | Age: 63
DRG: 005 | End: 2018-07-23
Payer: MEDICARE

## 2018-07-23 PROBLEM — Z91.89 AT RISK FOR INFECTION TRANSMITTED FROM DONOR: Status: ACTIVE | Noted: 2018-07-23

## 2018-07-23 LAB
ALBUMIN SERPL BCP-MCNC: 1.4 G/DL
ALBUMIN SERPL BCP-MCNC: 1.5 G/DL
ALBUMIN SERPL BCP-MCNC: 1.8 G/DL
ALBUMIN SERPL BCP-MCNC: 1.9 G/DL
ALBUMIN SERPL BCP-MCNC: 2.4 G/DL
ALBUMIN SERPL BCP-MCNC: 2.6 G/DL
ALLENS TEST: ABNORMAL
ALP SERPL-CCNC: 48 U/L
ALP SERPL-CCNC: 52 U/L
ALT SERPL W/O P-5'-P-CCNC: 159 U/L
ALT SERPL W/O P-5'-P-CCNC: 173 U/L
ALT SERPL W/O P-5'-P-CCNC: 208 U/L
AMYLASE SERPL-CCNC: 30 U/L
ANION GAP SERPL CALC-SCNC: 12 MMOL/L
ANION GAP SERPL CALC-SCNC: 14 MMOL/L
ANION GAP SERPL CALC-SCNC: 17 MMOL/L
ANISOCYTOSIS BLD QL SMEAR: SLIGHT
APPEARANCE FLD: NORMAL
APTT BLDCRRT: 42.4 SEC
APTT BLDCRRT: 47.5 SEC
APTT BLDCRRT: 65.3 SEC
APTT BLDCRRT: 88.4 SEC
APTT BLDCRRT: >150 SEC
AST SERPL-CCNC: 255 U/L
AST SERPL-CCNC: 304 U/L
AST SERPL-CCNC: 346 U/L
AST SERPL-CCNC: 355 U/L
AST SERPL-CCNC: 367 U/L
BACTERIA UR CULT: NORMAL
BASOPHILS # BLD AUTO: 0.01 K/UL
BASOPHILS # BLD AUTO: 0.02 K/UL
BASOPHILS # BLD AUTO: 0.02 K/UL
BASOPHILS NFR BLD: 0.1 %
BASOPHILS NFR BLD: 0.2 %
BASOPHILS NFR BLD: 0.2 %
BILIRUB SERPL-MCNC: 3 MG/DL
BILIRUB SERPL-MCNC: 4.3 MG/DL
BLD PROD TYP BPU: NORMAL
BLOOD UNIT EXPIRATION DATE: NORMAL
BLOOD UNIT TYPE CODE: 5100
BLOOD UNIT TYPE CODE: 5100
BLOOD UNIT TYPE CODE: 6200
BLOOD UNIT TYPE CODE: 6200
BLOOD UNIT TYPE: NORMAL
BODY FLD TYPE: NORMAL
BUN SERPL-MCNC: 28 MG/DL
BUN SERPL-MCNC: 29 MG/DL
BUN SERPL-MCNC: 30 MG/DL
BURR CELLS BLD QL SMEAR: ABNORMAL
CA-I BLDV-SCNC: 0.94 MMOL/L
CA-I BLDV-SCNC: 1.08 MMOL/L
CA-I BLDV-SCNC: 1.2 MMOL/L
CA-I BLDV-SCNC: 1.31 MMOL/L
CALCIUM SERPL-MCNC: 7.8 MG/DL
CALCIUM SERPL-MCNC: 8 MG/DL
CALCIUM SERPL-MCNC: 8.1 MG/DL
CHLORIDE SERPL-SCNC: 109 MMOL/L
CHLORIDE SERPL-SCNC: 109 MMOL/L
CHLORIDE SERPL-SCNC: 111 MMOL/L
CO2 SERPL-SCNC: 16 MMOL/L
CO2 SERPL-SCNC: 18 MMOL/L
CO2 SERPL-SCNC: 20 MMOL/L
CODING SYSTEM: NORMAL
COLOR FLD: YELLOW
CREAT SERPL-MCNC: 2 MG/DL
CREAT SERPL-MCNC: 2.2 MG/DL
CREAT SERPL-MCNC: 2.2 MG/DL
DELSYS: ABNORMAL
DIFFERENTIAL METHOD: ABNORMAL
DISPENSE STATUS: NORMAL
EOSINOPHIL # BLD AUTO: 0 K/UL
EOSINOPHIL NFR BLD: 0 %
EOSINOPHIL NFR BLD: 0.1 %
EOSINOPHIL NFR BLD: 0.3 %
ERYTHROCYTE [DISTWIDTH] IN BLOOD BY AUTOMATED COUNT: 18.6 %
ERYTHROCYTE [DISTWIDTH] IN BLOOD BY AUTOMATED COUNT: 19.1 %
ERYTHROCYTE [DISTWIDTH] IN BLOOD BY AUTOMATED COUNT: 19.5 %
ERYTHROCYTE [SEDIMENTATION RATE] IN BLOOD BY WESTERGREN METHOD: 14 MM/H
ERYTHROCYTE [SEDIMENTATION RATE] IN BLOOD BY WESTERGREN METHOD: 16 MM/H
ERYTHROCYTE [SEDIMENTATION RATE] IN BLOOD BY WESTERGREN METHOD: 16 MM/H
EST. GFR  (AFRICAN AMERICAN): 35.8 ML/MIN/1.73 M^2
EST. GFR  (AFRICAN AMERICAN): 35.8 ML/MIN/1.73 M^2
EST. GFR  (AFRICAN AMERICAN): 40.2 ML/MIN/1.73 M^2
EST. GFR  (NON AFRICAN AMERICAN): 31 ML/MIN/1.73 M^2
EST. GFR  (NON AFRICAN AMERICAN): 31 ML/MIN/1.73 M^2
EST. GFR  (NON AFRICAN AMERICAN): 34.7 ML/MIN/1.73 M^2
FIBRINOGEN PPP-MCNC: 116 MG/DL
FIBRINOGEN PPP-MCNC: 76 MG/DL
FIBRINOGEN PPP-MCNC: 97 MG/DL
FIBRINOGEN PPP-MCNC: <70 MG/DL
FIO2: 100
FIO2: 40
FIO2: 40
FIO2: 50
GLUCOSE SERPL-MCNC: 106 MG/DL (ref 70–110)
GLUCOSE SERPL-MCNC: 117 MG/DL (ref 70–110)
GLUCOSE SERPL-MCNC: 120 MG/DL
GLUCOSE SERPL-MCNC: 127 MG/DL (ref 70–110)
GLUCOSE SERPL-MCNC: 128 MG/DL (ref 70–110)
GLUCOSE SERPL-MCNC: 130 MG/DL (ref 70–110)
GLUCOSE SERPL-MCNC: 132 MG/DL (ref 70–110)
GLUCOSE SERPL-MCNC: 155 MG/DL
GLUCOSE SERPL-MCNC: 155 MG/DL (ref 70–110)
GLUCOSE SERPL-MCNC: 161 MG/DL
GLUCOSE SERPL-MCNC: 164 MG/DL (ref 70–110)
GLUCOSE SERPL-MCNC: 165 MG/DL (ref 70–110)
GLUCOSE SERPL-MCNC: 171 MG/DL
GLUCOSE SERPL-MCNC: 185 MG/DL
GLUCOSE SERPL-MCNC: 277 MG/DL
GLUCOSE SERPL-MCNC: 301 MG/DL
GLUCOSE SERPL-MCNC: 98 MG/DL (ref 70–110)
GRAM STN SPEC: NORMAL
GRAM STN SPEC: NORMAL
HCO3 UR-SCNC: 18.2 MMOL/L (ref 24–28)
HCO3 UR-SCNC: 18.3 MMOL/L (ref 24–28)
HCO3 UR-SCNC: 19.7 MMOL/L (ref 24–28)
HCO3 UR-SCNC: 20.1 MMOL/L (ref 24–28)
HCO3 UR-SCNC: 20.3 MMOL/L (ref 24–28)
HCO3 UR-SCNC: 20.3 MMOL/L (ref 24–28)
HCO3 UR-SCNC: 20.5 MMOL/L (ref 24–28)
HCO3 UR-SCNC: 20.5 MMOL/L (ref 24–28)
HCO3 UR-SCNC: 20.9 MMOL/L (ref 24–28)
HCO3 UR-SCNC: 21.1 MMOL/L (ref 24–28)
HCO3 UR-SCNC: 21.8 MMOL/L (ref 24–28)
HCO3 UR-SCNC: 22.2 MMOL/L (ref 24–28)
HCO3 UR-SCNC: 22.5 MMOL/L (ref 24–28)
HCO3 UR-SCNC: 22.5 MMOL/L (ref 24–28)
HCO3 UR-SCNC: 24.1 MMOL/L (ref 24–28)
HCT VFR BLD AUTO: 23.5 %
HCT VFR BLD AUTO: 28 %
HCT VFR BLD AUTO: 29.3 %
HCT VFR BLD AUTO: 29.5 %
HCT VFR BLD AUTO: 32 %
HCT VFR BLD AUTO: 32.7 %
HCT VFR BLD AUTO: 35.4 %
HCT VFR BLD CALC: 20 %PCV (ref 36–54)
HCT VFR BLD CALC: 22 %PCV (ref 36–54)
HCT VFR BLD CALC: 24 %PCV (ref 36–54)
HCT VFR BLD CALC: 25 %PCV (ref 36–54)
HCT VFR BLD CALC: 25 %PCV (ref 36–54)
HCT VFR BLD CALC: 27 %PCV (ref 36–54)
HCT VFR BLD CALC: 28 %PCV (ref 36–54)
HCT VFR BLD CALC: 34 %PCV (ref 36–54)
HGB BLD-MCNC: 10.4 G/DL
HGB BLD-MCNC: 10.6 G/DL
HGB BLD-MCNC: 11.5 G/DL
HGB BLD-MCNC: 7.6 G/DL
HGB BLD-MCNC: 9.3 G/DL
HGB BLD-MCNC: 9.3 G/DL
HGB BLD-MCNC: 9.7 G/DL
HYPOCHROMIA BLD QL SMEAR: ABNORMAL
IMM GRANULOCYTES # BLD AUTO: 0.09 K/UL
IMM GRANULOCYTES # BLD AUTO: 0.1 K/UL
IMM GRANULOCYTES # BLD AUTO: 0.1 K/UL
IMM GRANULOCYTES NFR BLD AUTO: 0.9 %
IMM GRANULOCYTES NFR BLD AUTO: 0.9 %
IMM GRANULOCYTES NFR BLD AUTO: 1 %
INR PPP: 1.5
INR PPP: 1.5
INR PPP: 1.6
INR PPP: 1.7
INR PPP: 1.8
INR PPP: 1.8
INR PPP: 2.2
INR PPP: 2.8
LDH SERPL L TO P-CCNC: 1364 U/L
LDH SERPL L TO P-CCNC: 4.84 MMOL/L (ref 0.36–1.25)
LDH SERPL L TO P-CCNC: 7.07 MMOL/L (ref 0.36–1.25)
LYMPHOCYTES # BLD AUTO: 0.4 K/UL
LYMPHOCYTES # BLD AUTO: 0.5 K/UL
LYMPHOCYTES # BLD AUTO: 0.5 K/UL
LYMPHOCYTES NFR BLD: 4 %
LYMPHOCYTES NFR BLD: 4.6 %
LYMPHOCYTES NFR BLD: 4.8 %
LYMPHOCYTES NFR FLD MANUAL: 19 %
MAGNESIUM SERPL-MCNC: 2.2 MG/DL
MAGNESIUM SERPL-MCNC: 2.2 MG/DL
MAGNESIUM SERPL-MCNC: 2.4 MG/DL
MAGNESIUM SERPL-MCNC: 3.2 MG/DL
MCH RBC QN AUTO: 31.6 PG
MCH RBC QN AUTO: 31.9 PG
MCH RBC QN AUTO: 32.3 PG
MCHC RBC AUTO-ENTMCNC: 31.7 G/DL
MCHC RBC AUTO-ENTMCNC: 31.8 G/DL
MCHC RBC AUTO-ENTMCNC: 32.9 G/DL
MCV RBC AUTO: 100 FL
MCV RBC AUTO: 100 FL
MCV RBC AUTO: 98 FL
MESOTHL CELL NFR FLD MANUAL: 4 %
MODE: ABNORMAL
MONOCYTES # BLD AUTO: 1 K/UL
MONOCYTES # BLD AUTO: 1.2 K/UL
MONOCYTES # BLD AUTO: 1.7 K/UL
MONOCYTES NFR BLD: 10.4 %
MONOCYTES NFR BLD: 10.6 %
MONOCYTES NFR BLD: 16.8 %
MONOS+MACROS NFR FLD MANUAL: 40 %
NEUTROPHILS # BLD AUTO: 7.8 K/UL
NEUTROPHILS # BLD AUTO: 7.9 K/UL
NEUTROPHILS # BLD AUTO: 9.7 K/UL
NEUTROPHILS NFR BLD: 76.9 %
NEUTROPHILS NFR BLD: 83.7 %
NEUTROPHILS NFR BLD: 84.5 %
NEUTROPHILS NFR FLD MANUAL: 37 %
NRBC BLD-RTO: 0 /100 WBC
NRBC BLD-RTO: 1 /100 WBC
NRBC BLD-RTO: 1 /100 WBC
OVALOCYTES BLD QL SMEAR: ABNORMAL
OVALOCYTES BLD QL SMEAR: ABNORMAL
PCO2 BLDA: 35.5 MMHG (ref 35–45)
PCO2 BLDA: 37.1 MMHG (ref 35–45)
PCO2 BLDA: 38.4 MMHG (ref 35–45)
PCO2 BLDA: 38.7 MMHG (ref 35–45)
PCO2 BLDA: 38.8 MMHG (ref 35–45)
PCO2 BLDA: 38.9 MMHG (ref 35–45)
PCO2 BLDA: 39.3 MMHG (ref 35–45)
PCO2 BLDA: 39.5 MMHG (ref 35–45)
PCO2 BLDA: 39.9 MMHG (ref 35–45)
PCO2 BLDA: 40.2 MMHG (ref 35–45)
PCO2 BLDA: 42.7 MMHG (ref 35–45)
PCO2 BLDA: 43.8 MMHG (ref 35–45)
PCO2 BLDA: 45.8 MMHG (ref 35–45)
PCO2 BLDA: 49.3 MMHG (ref 35–45)
PCO2 BLDA: 51 MMHG (ref 35–45)
PEEP: 5
PH SMN: 7.21 [PH] (ref 7.35–7.45)
PH SMN: 7.23 [PH] (ref 7.35–7.45)
PH SMN: 7.28 [PH] (ref 7.35–7.45)
PH SMN: 7.28 [PH] (ref 7.35–7.45)
PH SMN: 7.29 [PH] (ref 7.35–7.45)
PH SMN: 7.31 [PH] (ref 7.35–7.45)
PH SMN: 7.31 [PH] (ref 7.35–7.45)
PH SMN: 7.32 [PH] (ref 7.35–7.45)
PH SMN: 7.32 [PH] (ref 7.35–7.45)
PH SMN: 7.33 [PH] (ref 7.35–7.45)
PH SMN: 7.34 [PH] (ref 7.35–7.45)
PH SMN: 7.36 [PH] (ref 7.35–7.45)
PH SMN: 7.36 [PH] (ref 7.35–7.45)
PH SMN: 7.37 [PH] (ref 7.35–7.45)
PH SMN: 7.37 [PH] (ref 7.35–7.45)
PIP: 8
PLATELET # BLD AUTO: 26 K/UL
PLATELET # BLD AUTO: 31 K/UL
PLATELET # BLD AUTO: 31 K/UL
PLATELET # BLD AUTO: 38 K/UL
PLATELET # BLD AUTO: 38 K/UL
PLATELET # BLD AUTO: 53 K/UL
PLATELET # BLD AUTO: 81 K/UL
PLATELET BLD QL SMEAR: ABNORMAL
PMV BLD AUTO: 10.9 FL
PMV BLD AUTO: 11.4 FL
PMV BLD AUTO: 11.9 FL
PMV BLD AUTO: 12.3 FL
PMV BLD AUTO: 12.3 FL
PMV BLD AUTO: 12.4 FL
PMV BLD AUTO: 13.9 FL
PO2 BLDA: 132 MMHG (ref 80–100)
PO2 BLDA: 152 MMHG (ref 80–100)
PO2 BLDA: 157 MMHG (ref 80–100)
PO2 BLDA: 157 MMHG (ref 80–100)
PO2 BLDA: 158 MMHG (ref 80–100)
PO2 BLDA: 189 MMHG (ref 80–100)
PO2 BLDA: 193 MMHG (ref 80–100)
PO2 BLDA: 229 MMHG (ref 80–100)
PO2 BLDA: 322 MMHG (ref 80–100)
PO2 BLDA: 336 MMHG (ref 80–100)
PO2 BLDA: 62 MMHG (ref 40–60)
PO2 BLDA: 88 MMHG (ref 80–100)
PO2 BLDA: 94 MMHG (ref 80–100)
PO2 BLDA: 96 MMHG (ref 80–100)
PO2 BLDA: 99 MMHG (ref 80–100)
POC BE: -2 MMOL/L
POC BE: -3 MMOL/L
POC BE: -3 MMOL/L
POC BE: -4 MMOL/L
POC BE: -4 MMOL/L
POC BE: -5 MMOL/L
POC BE: -6 MMOL/L
POC BE: -7 MMOL/L
POC BE: -8 MMOL/L
POC BE: -8 MMOL/L
POC BE: -9 MMOL/L
POC IONIZED CALCIUM: 1.07 MMOL/L (ref 1.06–1.42)
POC IONIZED CALCIUM: 1.14 MMOL/L (ref 1.06–1.42)
POC IONIZED CALCIUM: 1.16 MMOL/L (ref 1.06–1.42)
POC IONIZED CALCIUM: 1.21 MMOL/L (ref 1.06–1.42)
POC IONIZED CALCIUM: 1.25 MMOL/L (ref 1.06–1.42)
POC IONIZED CALCIUM: 1.27 MMOL/L (ref 1.06–1.42)
POC IONIZED CALCIUM: 1.28 MMOL/L (ref 1.06–1.42)
POC IONIZED CALCIUM: 1.29 MMOL/L (ref 1.06–1.42)
POC IONIZED CALCIUM: 1.34 MMOL/L (ref 1.06–1.42)
POC IONIZED CALCIUM: 1.36 MMOL/L (ref 1.06–1.42)
POC IONIZED CALCIUM: 1.62 MMOL/L (ref 1.06–1.42)
POC SATURATED O2: 100 % (ref 95–100)
POC SATURATED O2: 86 % (ref 95–100)
POC SATURATED O2: 96 % (ref 95–100)
POC SATURATED O2: 97 % (ref 95–100)
POC SATURATED O2: 99 % (ref 95–100)
POC TCO2: 19 MMOL/L (ref 23–27)
POC TCO2: 19 MMOL/L (ref 23–27)
POC TCO2: 21 MMOL/L (ref 23–27)
POC TCO2: 22 MMOL/L (ref 23–27)
POC TCO2: 22 MMOL/L (ref 24–29)
POC TCO2: 23 MMOL/L (ref 23–27)
POC TCO2: 23 MMOL/L (ref 23–27)
POC TCO2: 24 MMOL/L (ref 23–27)
POC TCO2: 24 MMOL/L (ref 23–27)
POC TCO2: 25 MMOL/L (ref 23–27)
POCT GLUCOSE: 180 MG/DL (ref 70–110)
POCT GLUCOSE: 207 MG/DL (ref 70–110)
POCT GLUCOSE: 211 MG/DL (ref 70–110)
POCT GLUCOSE: 238 MG/DL (ref 70–110)
POCT GLUCOSE: 260 MG/DL (ref 70–110)
POCT GLUCOSE: 265 MG/DL (ref 70–110)
POCT GLUCOSE: 283 MG/DL (ref 70–110)
POCT GLUCOSE: 284 MG/DL (ref 70–110)
POCT GLUCOSE: 290 MG/DL (ref 70–110)
POCT GLUCOSE: 298 MG/DL (ref 70–110)
POCT GLUCOSE: 85 MG/DL (ref 70–110)
POIKILOCYTOSIS BLD QL SMEAR: SLIGHT
POLYCHROMASIA BLD QL SMEAR: ABNORMAL
POOLED CRYOPPT GVN BPU: NORMAL
POOLED CRYOPPT GVN BPU: NORMAL
POTASSIUM BLD-SCNC: 3.1 MMOL/L (ref 3.5–5.1)
POTASSIUM BLD-SCNC: 3.3 MMOL/L (ref 3.5–5.1)
POTASSIUM BLD-SCNC: 3.3 MMOL/L (ref 3.5–5.1)
POTASSIUM BLD-SCNC: 3.6 MMOL/L (ref 3.5–5.1)
POTASSIUM BLD-SCNC: 3.7 MMOL/L (ref 3.5–5.1)
POTASSIUM BLD-SCNC: 3.8 MMOL/L (ref 3.5–5.1)
POTASSIUM BLD-SCNC: 4 MMOL/L (ref 3.5–5.1)
POTASSIUM BLD-SCNC: 4 MMOL/L (ref 3.5–5.1)
POTASSIUM BLD-SCNC: 4.1 MMOL/L (ref 3.5–5.1)
POTASSIUM BLD-SCNC: 4.3 MMOL/L (ref 3.5–5.1)
POTASSIUM BLD-SCNC: 4.4 MMOL/L (ref 3.5–5.1)
POTASSIUM SERPL-SCNC: 3.4 MMOL/L
POTASSIUM SERPL-SCNC: 4 MMOL/L
POTASSIUM SERPL-SCNC: 4 MMOL/L
POTASSIUM SERPL-SCNC: 4.2 MMOL/L
POTASSIUM SERPL-SCNC: 4.2 MMOL/L
POTASSIUM SERPL-SCNC: 4.3 MMOL/L
POTASSIUM SERPL-SCNC: 4.4 MMOL/L
PROT SERPL-MCNC: 3 G/DL
PROT SERPL-MCNC: 3.7 G/DL
PROTHROMBIN TIME: 14.9 SEC
PROTHROMBIN TIME: 15.1 SEC
PROTHROMBIN TIME: 15.7 SEC
PROTHROMBIN TIME: 16.4 SEC
PROTHROMBIN TIME: 17.6 SEC
PROTHROMBIN TIME: 17.8 SEC
PROTHROMBIN TIME: 21.1 SEC
PROTHROMBIN TIME: 27.2 SEC
PS: 10
PS: 5
PS: 5
RBC # BLD AUTO: 2.94 M/UL
RBC # BLD AUTO: 3 M/UL
RBC # BLD AUTO: 3.26 M/UL
SAMPLE: ABNORMAL
SITE: ABNORMAL
SODIUM BLD-SCNC: 142 MMOL/L (ref 136–145)
SODIUM BLD-SCNC: 143 MMOL/L (ref 136–145)
SODIUM BLD-SCNC: 145 MMOL/L (ref 136–145)
SODIUM BLD-SCNC: 147 MMOL/L (ref 136–145)
SODIUM BLD-SCNC: 148 MMOL/L (ref 136–145)
SODIUM BLD-SCNC: 149 MMOL/L (ref 136–145)
SODIUM BLD-SCNC: 150 MMOL/L (ref 136–145)
SODIUM BLD-SCNC: 151 MMOL/L (ref 136–145)
SODIUM BLD-SCNC: 154 MMOL/L (ref 136–145)
SODIUM SERPL-SCNC: 141 MMOL/L
SODIUM SERPL-SCNC: 142 MMOL/L
SODIUM SERPL-SCNC: 143 MMOL/L
SODIUM SERPL-SCNC: 143 MMOL/L
SODIUM SERPL-SCNC: 145 MMOL/L
SODIUM SERPL-SCNC: 148 MMOL/L
SODIUM SERPL-SCNC: 151 MMOL/L
SP02: 100
SP02: 100
SP02: 96
SP02: 99
SP02: 99
TRANS PLATPHERESIS VOL PATIENT: NORMAL ML
TRANS PLATPHERESIS VOL PATIENT: NORMAL ML
VT: 500
VT: 500
WBC # BLD AUTO: 10.1 K/UL
WBC # BLD AUTO: 11.49 K/UL
WBC # BLD AUTO: 9.5 K/UL
WBC # FLD: 184 /CU MM

## 2018-07-23 PROCEDURE — 85520 HEPARIN ASSAY: CPT

## 2018-07-23 PROCEDURE — 85014 HEMATOCRIT: CPT | Mod: NTX

## 2018-07-23 PROCEDURE — S0028 INJECTION, FAMOTIDINE, 20 MG: HCPCS | Performed by: PEDIATRICS

## 2018-07-23 PROCEDURE — 85610 PROTHROMBIN TIME: CPT | Mod: 91

## 2018-07-23 PROCEDURE — 25000003 PHARM REV CODE 250

## 2018-07-23 PROCEDURE — 37000009 HC ANESTHESIA EA ADD 15 MINS: Performed by: TRANSPLANT SURGERY

## 2018-07-23 PROCEDURE — 25000003 PHARM REV CODE 250: Mod: NTX | Performed by: NURSE ANESTHETIST, CERTIFIED REGISTERED

## 2018-07-23 PROCEDURE — 86965 POOLING BLOOD PLATELETS: CPT

## 2018-07-23 PROCEDURE — P9047 ALBUMIN (HUMAN), 25%, 50ML: HCPCS | Mod: JG | Performed by: STUDENT IN AN ORGANIZED HEALTH CARE EDUCATION/TRAINING PROGRAM

## 2018-07-23 PROCEDURE — 63600175 PHARM REV CODE 636 W HCPCS: Performed by: TRANSPLANT SURGERY

## 2018-07-23 PROCEDURE — 63600175 PHARM REV CODE 636 W HCPCS: Mod: NTX | Performed by: NURSE ANESTHETIST, CERTIFIED REGISTERED

## 2018-07-23 PROCEDURE — 27201041 HC RESERVOIR, CARDIOTOMY

## 2018-07-23 PROCEDURE — 82040 ASSAY OF SERUM ALBUMIN: CPT | Mod: 91

## 2018-07-23 PROCEDURE — 99900035 HC TECH TIME PER 15 MIN (STAT)

## 2018-07-23 PROCEDURE — 63600175 PHARM REV CODE 636 W HCPCS: Mod: JG,NTX | Performed by: NURSE ANESTHETIST, CERTIFIED REGISTERED

## 2018-07-23 PROCEDURE — 88313 SPECIAL STAINS GROUP 2: CPT | Performed by: PATHOLOGY

## 2018-07-23 PROCEDURE — 83735 ASSAY OF MAGNESIUM: CPT | Mod: 91

## 2018-07-23 PROCEDURE — 82565 ASSAY OF CREATININE: CPT

## 2018-07-23 PROCEDURE — 84295 ASSAY OF SERUM SODIUM: CPT | Mod: NTX

## 2018-07-23 PROCEDURE — 20000000 HC ICU ROOM

## 2018-07-23 PROCEDURE — P9035 PLATELET PHERES LEUKOREDUCED: HCPCS

## 2018-07-23 PROCEDURE — 82306 VITAMIN D 25 HYDROXY: CPT

## 2018-07-23 PROCEDURE — 84460 ALANINE AMINO (ALT) (SGPT): CPT

## 2018-07-23 PROCEDURE — 25000003 PHARM REV CODE 250: Performed by: INTERNAL MEDICINE

## 2018-07-23 PROCEDURE — 83735 ASSAY OF MAGNESIUM: CPT | Mod: NTX

## 2018-07-23 PROCEDURE — 27000174 HC ADDITIONAL TIME, PER HR

## 2018-07-23 PROCEDURE — 82803 BLOOD GASES ANY COMBINATION: CPT

## 2018-07-23 PROCEDURE — 25000003 PHARM REV CODE 250: Performed by: TRANSPLANT SURGERY

## 2018-07-23 PROCEDURE — P9045 ALBUMIN (HUMAN), 5%, 250 ML: HCPCS | Mod: JG

## 2018-07-23 PROCEDURE — 25000003 PHARM REV CODE 250: Performed by: PEDIATRICS

## 2018-07-23 PROCEDURE — 47135 TRANSPLANTATION OF LIVER: CPT | Mod: 82,,, | Performed by: SURGERY

## 2018-07-23 PROCEDURE — 83605 ASSAY OF LACTIC ACID: CPT

## 2018-07-23 PROCEDURE — 36556 INSERT NON-TUNNEL CV CATH: CPT | Mod: 59,NTX,, | Performed by: ANESTHESIOLOGY

## 2018-07-23 PROCEDURE — 36000930 HC OR TIME LEV VII 1ST 15 MIN: Performed by: TRANSPLANT SURGERY

## 2018-07-23 PROCEDURE — 0FY00Z0 TRANSPLANTATION OF LIVER, ALLOGENEIC, OPEN APPROACH: ICD-10-PCS | Performed by: TRANSPLANT SURGERY

## 2018-07-23 PROCEDURE — 85014 HEMATOCRIT: CPT

## 2018-07-23 PROCEDURE — 63600175 PHARM REV CODE 636 W HCPCS: Performed by: PHYSICIAN ASSISTANT

## 2018-07-23 PROCEDURE — 94002 VENT MGMT INPAT INIT DAY: CPT

## 2018-07-23 PROCEDURE — 80053 COMPREHEN METABOLIC PANEL: CPT | Mod: 91

## 2018-07-23 PROCEDURE — 85002 BLEEDING TIME TEST: CPT

## 2018-07-23 PROCEDURE — 37000008 HC ANESTHESIA 1ST 15 MINUTES: Performed by: TRANSPLANT SURGERY

## 2018-07-23 PROCEDURE — 85018 HEMOGLOBIN: CPT | Mod: 91

## 2018-07-23 PROCEDURE — P9047 ALBUMIN (HUMAN), 25%, 50ML: HCPCS | Mod: JG,NTX | Performed by: NURSE ANESTHETIST, CERTIFIED REGISTERED

## 2018-07-23 PROCEDURE — 27000191 HC C-V MONITORING

## 2018-07-23 PROCEDURE — 84450 TRANSFERASE (AST) (SGOT): CPT | Mod: 91

## 2018-07-23 PROCEDURE — 25000003 PHARM REV CODE 250: Performed by: PHYSICIAN ASSISTANT

## 2018-07-23 PROCEDURE — P9017 PLASMA 1 DONOR FRZ W/IN 8 HR: HCPCS

## 2018-07-23 PROCEDURE — 85384 FIBRINOGEN ACTIVITY: CPT | Mod: 91

## 2018-07-23 PROCEDURE — 63600175 PHARM REV CODE 636 W HCPCS: Mod: NTX | Performed by: STUDENT IN AN ORGANIZED HEALTH CARE EDUCATION/TRAINING PROGRAM

## 2018-07-23 PROCEDURE — 87102 FUNGUS ISOLATION CULTURE: CPT

## 2018-07-23 PROCEDURE — 84295 ASSAY OF SERUM SODIUM: CPT

## 2018-07-23 PROCEDURE — 85049 AUTOMATED PLATELET COUNT: CPT | Mod: NTX

## 2018-07-23 PROCEDURE — 63600175 PHARM REV CODE 636 W HCPCS: Mod: JG | Performed by: STUDENT IN AN ORGANIZED HEALTH CARE EDUCATION/TRAINING PROGRAM

## 2018-07-23 PROCEDURE — 82800 BLOOD PH: CPT

## 2018-07-23 PROCEDURE — P9012 CRYOPRECIPITATE EACH UNIT: HCPCS

## 2018-07-23 PROCEDURE — P9021 RED BLOOD CELLS UNIT: HCPCS

## 2018-07-23 PROCEDURE — 82150 ASSAY OF AMYLASE: CPT

## 2018-07-23 PROCEDURE — 82330 ASSAY OF CALCIUM: CPT

## 2018-07-23 PROCEDURE — 37799 UNLISTED PX VASCULAR SURGERY: CPT

## 2018-07-23 PROCEDURE — 90945 DIALYSIS ONE EVALUATION: CPT

## 2018-07-23 PROCEDURE — 27201423 OPTIME MED/SURG SUP & DEVICES STERILE SUPPLY: Performed by: TRANSPLANT SURGERY

## 2018-07-23 PROCEDURE — 85730 THROMBOPLASTIN TIME PARTIAL: CPT | Mod: NTX

## 2018-07-23 PROCEDURE — 89051 BODY FLUID CELL COUNT: CPT

## 2018-07-23 PROCEDURE — D9220A PRA ANESTHESIA: Mod: ANES,NTX,, | Performed by: ANESTHESIOLOGY

## 2018-07-23 PROCEDURE — S5010 5% DEXTROSE AND 0.45% SALINE: HCPCS | Performed by: PEDIATRICS

## 2018-07-23 PROCEDURE — 99233 SBSQ HOSP IP/OBS HIGH 50: CPT | Mod: ,,, | Performed by: INTERNAL MEDICINE

## 2018-07-23 PROCEDURE — 27200966 HC CLOSED SUCTION SYSTEM

## 2018-07-23 PROCEDURE — 25000003 PHARM REV CODE 250: Performed by: STUDENT IN AN ORGANIZED HEALTH CARE EDUCATION/TRAINING PROGRAM

## 2018-07-23 PROCEDURE — 85014 HEMATOCRIT: CPT | Mod: 91

## 2018-07-23 PROCEDURE — 85049 AUTOMATED PLATELET COUNT: CPT | Mod: 91

## 2018-07-23 PROCEDURE — P9045 ALBUMIN (HUMAN), 5%, 250 ML: HCPCS | Mod: JG | Performed by: STUDENT IN AN ORGANIZED HEALTH CARE EDUCATION/TRAINING PROGRAM

## 2018-07-23 PROCEDURE — 85730 THROMBOPLASTIN TIME PARTIAL: CPT | Mod: 91

## 2018-07-23 PROCEDURE — 80053 COMPREHEN METABOLIC PANEL: CPT

## 2018-07-23 PROCEDURE — 84132 ASSAY OF SERUM POTASSIUM: CPT | Mod: 91

## 2018-07-23 PROCEDURE — 82947 ASSAY GLUCOSE BLOOD QUANT: CPT | Mod: NTX

## 2018-07-23 PROCEDURE — 80048 BASIC METABOLIC PNL TOTAL CA: CPT

## 2018-07-23 PROCEDURE — 63600175 PHARM REV CODE 636 W HCPCS: Mod: NTX | Performed by: TRANSPLANT SURGERY

## 2018-07-23 PROCEDURE — 87075 CULTR BACTERIA EXCEPT BLOOD: CPT

## 2018-07-23 PROCEDURE — 82947 ASSAY GLUCOSE BLOOD QUANT: CPT | Mod: 91

## 2018-07-23 PROCEDURE — 80100014 HC HEMODIALYSIS 1:1

## 2018-07-23 PROCEDURE — 85018 HEMOGLOBIN: CPT | Mod: NTX

## 2018-07-23 PROCEDURE — 85610 PROTHROMBIN TIME: CPT | Mod: NTX

## 2018-07-23 PROCEDURE — 63600175 PHARM REV CODE 636 W HCPCS: Performed by: INTERNAL MEDICINE

## 2018-07-23 PROCEDURE — 84295 ASSAY OF SERUM SODIUM: CPT | Mod: 91

## 2018-07-23 PROCEDURE — 85384 FIBRINOGEN ACTIVITY: CPT | Mod: NTX

## 2018-07-23 PROCEDURE — 82330 ASSAY OF CALCIUM: CPT | Mod: 91

## 2018-07-23 PROCEDURE — 85025 COMPLETE CBC W/AUTO DIFF WBC: CPT | Mod: 91

## 2018-07-23 PROCEDURE — D9220A PRA ANESTHESIA: Mod: CRNA,NTX,, | Performed by: NURSE ANESTHETIST, CERTIFIED REGISTERED

## 2018-07-23 PROCEDURE — 82040 ASSAY OF SERUM ALBUMIN: CPT | Mod: NTX

## 2018-07-23 PROCEDURE — 99900026 HC AIRWAY MAINTENANCE (STAT)

## 2018-07-23 PROCEDURE — 25000003 PHARM REV CODE 250: Performed by: NURSE PRACTITIONER

## 2018-07-23 PROCEDURE — 88313 SPECIAL STAINS GROUP 2: CPT | Mod: 26,,, | Performed by: PATHOLOGY

## 2018-07-23 PROCEDURE — 84450 TRANSFERASE (AST) (SGOT): CPT

## 2018-07-23 PROCEDURE — 63600175 PHARM REV CODE 636 W HCPCS: Mod: JG

## 2018-07-23 PROCEDURE — 87205 SMEAR GRAM STAIN: CPT

## 2018-07-23 PROCEDURE — 84132 ASSAY OF SERUM POTASSIUM: CPT

## 2018-07-23 PROCEDURE — 36000931 HC OR TIME LEV VII EA ADD 15 MIN: Performed by: TRANSPLANT SURGERY

## 2018-07-23 PROCEDURE — 83615 LACTATE (LD) (LDH) ENZYME: CPT

## 2018-07-23 PROCEDURE — 87070 CULTURE OTHR SPECIMN AEROBIC: CPT

## 2018-07-23 PROCEDURE — 63600175 PHARM REV CODE 636 W HCPCS: Performed by: STUDENT IN AN ORGANIZED HEALTH CARE EDUCATION/TRAINING PROGRAM

## 2018-07-23 PROCEDURE — 93503 INSERT/PLACE HEART CATHETER: CPT | Mod: 59,NTX,, | Performed by: ANESTHESIOLOGY

## 2018-07-23 PROCEDURE — 82330 ASSAY OF CALCIUM: CPT | Mod: NTX

## 2018-07-23 PROCEDURE — 94761 N-INVAS EAR/PLS OXIMETRY MLT: CPT

## 2018-07-23 PROCEDURE — 84132 ASSAY OF SERUM POTASSIUM: CPT | Mod: NTX

## 2018-07-23 PROCEDURE — 63600175 PHARM REV CODE 636 W HCPCS: Performed by: PEDIATRICS

## 2018-07-23 PROCEDURE — 85347 COAGULATION TIME ACTIVATED: CPT

## 2018-07-23 PROCEDURE — 36620 INSERTION CATHETER ARTERY: CPT | Mod: 59,NTX,, | Performed by: ANESTHESIOLOGY

## 2018-07-23 PROCEDURE — 47135 TRANSPLANTATION OF LIVER: CPT | Mod: ,,, | Performed by: TRANSPLANT SURGERY

## 2018-07-23 PROCEDURE — 88307 TISSUE EXAM BY PATHOLOGIST: CPT | Mod: 26,,, | Performed by: PATHOLOGY

## 2018-07-23 PROCEDURE — C1729 CATH, DRAINAGE: HCPCS | Performed by: TRANSPLANT SURGERY

## 2018-07-23 PROCEDURE — 36415 COLL VENOUS BLD VENIPUNCTURE: CPT

## 2018-07-23 PROCEDURE — 88304 TISSUE EXAM BY PATHOLOGIST: CPT | Mod: 26,,, | Performed by: PATHOLOGY

## 2018-07-23 PROCEDURE — 27100088 HC CELL SAVER

## 2018-07-23 RX ORDER — HYDROCODONE BITARTRATE AND ACETAMINOPHEN 500; 5 MG/1; MG/1
TABLET ORAL CONTINUOUS
Status: ACTIVE | OUTPATIENT
Start: 2018-07-23 | End: 2018-07-24

## 2018-07-23 RX ORDER — DEXTROSE MONOHYDRATE AND SODIUM CHLORIDE 5; .45 G/100ML; G/100ML
INJECTION, SOLUTION INTRAVENOUS CONTINUOUS
Status: DISCONTINUED | OUTPATIENT
Start: 2018-07-23 | End: 2018-07-25

## 2018-07-23 RX ORDER — LIDOCAINE HCL/PF 100 MG/5ML
SYRINGE (ML) INTRAVENOUS
Status: DISCONTINUED | OUTPATIENT
Start: 2018-07-23 | End: 2018-07-23

## 2018-07-23 RX ORDER — NOREPINEPHRINE BITARTRATE/D5W 4MG/250ML
0.02 PLASTIC BAG, INJECTION (ML) INTRAVENOUS CONTINUOUS
Status: DISCONTINUED | OUTPATIENT
Start: 2018-07-23 | End: 2018-07-26

## 2018-07-23 RX ORDER — NYSTATIN 100000 [USP'U]/ML
500000 SUSPENSION ORAL 4 TIMES DAILY
Status: DISCONTINUED | OUTPATIENT
Start: 2018-07-23 | End: 2018-07-25

## 2018-07-23 RX ORDER — HEPARIN SODIUM 1000 [USP'U]/ML
INJECTION, SOLUTION INTRAVENOUS; SUBCUTANEOUS
Status: DISCONTINUED | OUTPATIENT
Start: 2018-07-23 | End: 2018-07-23

## 2018-07-23 RX ORDER — ROCURONIUM BROMIDE 10 MG/ML
INJECTION, SOLUTION INTRAVENOUS
Status: DISCONTINUED | OUTPATIENT
Start: 2018-07-23 | End: 2018-07-23

## 2018-07-23 RX ORDER — NOREPINEPHRINE BITARTRATE/D5W 4MG/250ML
PLASTIC BAG, INJECTION (ML) INTRAVENOUS
Status: COMPLETED
Start: 2018-07-23 | End: 2018-07-23

## 2018-07-23 RX ORDER — GLUCAGON 1 MG
1 KIT INJECTION
Status: DISCONTINUED | OUTPATIENT
Start: 2018-07-23 | End: 2018-07-24

## 2018-07-23 RX ORDER — SULFAMETHOXAZOLE AND TRIMETHOPRIM 400; 80 MG/1; MG/1
1 TABLET ORAL EVERY MORNING
Status: DISCONTINUED | OUTPATIENT
Start: 2018-07-23 | End: 2018-07-31

## 2018-07-23 RX ORDER — FAMOTIDINE 10 MG/ML
20 INJECTION INTRAVENOUS EVERY 12 HOURS
Status: DISCONTINUED | OUTPATIENT
Start: 2018-07-23 | End: 2018-07-24

## 2018-07-23 RX ORDER — METHYLPREDNISOLONE SOD SUCC 125 MG
100 VIAL (EA) INJECTION EVERY 12 HOURS
Status: COMPLETED | OUTPATIENT
Start: 2018-07-24 | End: 2018-07-24

## 2018-07-23 RX ORDER — FENTANYL CITRATE 50 UG/ML
INJECTION, SOLUTION INTRAMUSCULAR; INTRAVENOUS
Status: DISCONTINUED | OUTPATIENT
Start: 2018-07-23 | End: 2018-07-23

## 2018-07-23 RX ORDER — ONDANSETRON 2 MG/ML
INJECTION INTRAMUSCULAR; INTRAVENOUS
Status: DISCONTINUED | OUTPATIENT
Start: 2018-07-23 | End: 2018-07-23

## 2018-07-23 RX ORDER — HEPARIN SODIUM 1000 [USP'U]/ML
INJECTION, SOLUTION INTRAVENOUS; SUBCUTANEOUS
Status: DISCONTINUED | OUTPATIENT
Start: 2018-07-23 | End: 2018-07-23 | Stop reason: HOSPADM

## 2018-07-23 RX ORDER — FLUCONAZOLE 2 MG/ML
400 INJECTION, SOLUTION INTRAVENOUS ONCE
Status: COMPLETED | OUTPATIENT
Start: 2018-07-23 | End: 2018-07-23

## 2018-07-23 RX ORDER — SODIUM BICARBONATE 42 MG/ML
INJECTION, SOLUTION INTRAVENOUS
Status: DISCONTINUED | OUTPATIENT
Start: 2018-07-23 | End: 2018-07-23

## 2018-07-23 RX ORDER — MAGNESIUM SULFATE HEPTAHYDRATE 40 MG/ML
2 INJECTION, SOLUTION INTRAVENOUS
Status: ACTIVE | OUTPATIENT
Start: 2018-07-23 | End: 2018-07-24

## 2018-07-23 RX ORDER — VECURONIUM BROMIDE FOR INJECTION 1 MG/ML
INJECTION, POWDER, LYOPHILIZED, FOR SOLUTION INTRAVENOUS
Status: DISCONTINUED | OUTPATIENT
Start: 2018-07-23 | End: 2018-07-23

## 2018-07-23 RX ORDER — ESMOLOL HYDROCHLORIDE 10 MG/ML
INJECTION INTRAVENOUS
Status: DISCONTINUED | OUTPATIENT
Start: 2018-07-23 | End: 2018-07-23

## 2018-07-23 RX ORDER — ALBUMIN HUMAN 50 G/1000ML
25 SOLUTION INTRAVENOUS ONCE
Status: COMPLETED | OUTPATIENT
Start: 2018-07-23 | End: 2018-07-23

## 2018-07-23 RX ORDER — ALBUMIN HUMAN 50 G/1000ML
SOLUTION INTRAVENOUS
Status: COMPLETED
Start: 2018-07-23 | End: 2018-07-23

## 2018-07-23 RX ORDER — MIDAZOLAM HYDROCHLORIDE 1 MG/ML
INJECTION, SOLUTION INTRAMUSCULAR; INTRAVENOUS
Status: DISCONTINUED | OUTPATIENT
Start: 2018-07-23 | End: 2018-07-23

## 2018-07-23 RX ORDER — LEVETIRACETAM 5 MG/ML
500 INJECTION INTRAVASCULAR EVERY 12 HOURS
Status: DISCONTINUED | OUTPATIENT
Start: 2018-07-23 | End: 2018-07-29

## 2018-07-23 RX ORDER — PREDNISONE 10 MG/1
20 TABLET ORAL DAILY
Status: DISCONTINUED | OUTPATIENT
Start: 2018-07-29 | End: 2018-08-04

## 2018-07-23 RX ORDER — HYDROMORPHONE HYDROCHLORIDE 1 MG/ML
0.2 INJECTION, SOLUTION INTRAMUSCULAR; INTRAVENOUS; SUBCUTANEOUS
Status: DISCONTINUED | OUTPATIENT
Start: 2018-07-23 | End: 2018-07-26

## 2018-07-23 RX ORDER — HYDROCODONE BITARTRATE AND ACETAMINOPHEN 500; 5 MG/1; MG/1
TABLET ORAL CONTINUOUS
Status: DISCONTINUED | OUTPATIENT
Start: 2018-07-23 | End: 2018-07-23

## 2018-07-23 RX ORDER — HEPARIN SODIUM 5000 [USP'U]/ML
5000 INJECTION, SOLUTION INTRAVENOUS; SUBCUTANEOUS EVERY 8 HOURS
Status: DISCONTINUED | OUTPATIENT
Start: 2018-07-23 | End: 2018-07-26

## 2018-07-23 RX ORDER — PROPOFOL 10 MG/ML
VIAL (ML) INTRAVENOUS
Status: DISCONTINUED | OUTPATIENT
Start: 2018-07-23 | End: 2018-07-23

## 2018-07-23 RX ORDER — VALGANCICLOVIR HYDROCHLORIDE 50 MG/ML
450 POWDER, FOR SOLUTION ORAL EVERY MORNING
Status: DISCONTINUED | OUTPATIENT
Start: 2018-07-23 | End: 2018-07-25

## 2018-07-23 RX ORDER — ALBUMIN HUMAN 250 G/1000ML
SOLUTION INTRAVENOUS CONTINUOUS PRN
Status: DISCONTINUED | OUTPATIENT
Start: 2018-07-23 | End: 2018-07-23

## 2018-07-23 RX ORDER — FUROSEMIDE 10 MG/ML
INJECTION INTRAMUSCULAR; INTRAVENOUS
Status: DISCONTINUED | OUTPATIENT
Start: 2018-07-23 | End: 2018-07-23

## 2018-07-23 RX ORDER — MAGNESIUM SULFATE HEPTAHYDRATE 40 MG/ML
2 INJECTION, SOLUTION INTRAVENOUS
Status: DISCONTINUED | OUTPATIENT
Start: 2018-07-23 | End: 2018-07-23

## 2018-07-23 RX ORDER — INSULIN ASPART 100 [IU]/ML
0-5 INJECTION, SOLUTION INTRAVENOUS; SUBCUTANEOUS
Status: DISCONTINUED | OUTPATIENT
Start: 2018-07-23 | End: 2018-07-23

## 2018-07-23 RX ORDER — CISATRACURIUM BESYLATE 10 MG/ML
INJECTION, SOLUTION INTRAVENOUS
Status: DISCONTINUED | OUTPATIENT
Start: 2018-07-23 | End: 2018-07-23

## 2018-07-23 RX ORDER — CIPROFLOXACIN 2 MG/ML
400 INJECTION, SOLUTION INTRAVENOUS
Status: DISCONTINUED | OUTPATIENT
Start: 2018-07-23 | End: 2018-07-23

## 2018-07-23 RX ORDER — CIPROFLOXACIN 2 MG/ML
INJECTION, SOLUTION INTRAVENOUS
Status: DISCONTINUED | OUTPATIENT
Start: 2018-07-23 | End: 2018-07-23

## 2018-07-23 RX ORDER — MYCOPHENOLATE MOFETIL 200 MG/ML
1000 POWDER, FOR SUSPENSION ORAL 2 TIMES DAILY
Status: DISCONTINUED | OUTPATIENT
Start: 2018-07-23 | End: 2018-07-29

## 2018-07-23 RX ORDER — METHYLPREDNISOLONE SOD SUCC 125 MG
80 VIAL (EA) INJECTION EVERY 12 HOURS
Status: COMPLETED | OUTPATIENT
Start: 2018-07-25 | End: 2018-07-25

## 2018-07-23 RX ORDER — PHENYLEPHRINE HYDROCHLORIDE 10 MG/ML
INJECTION INTRAVENOUS
Status: DISCONTINUED | OUTPATIENT
Start: 2018-07-23 | End: 2018-07-23

## 2018-07-23 RX ORDER — SODIUM CHLORIDE 9 MG/ML
INJECTION, SOLUTION INTRAVENOUS CONTINUOUS PRN
Status: DISCONTINUED | OUTPATIENT
Start: 2018-07-23 | End: 2018-07-23

## 2018-07-23 RX ORDER — ALBUMIN HUMAN 250 G/1000ML
12.5 SOLUTION INTRAVENOUS ONCE
Status: COMPLETED | OUTPATIENT
Start: 2018-07-23 | End: 2018-07-23

## 2018-07-23 RX ORDER — METHYLPREDNISOLONE SOD SUCC 125 MG
60 VIAL (EA) INJECTION EVERY 12 HOURS
Status: COMPLETED | OUTPATIENT
Start: 2018-07-26 | End: 2018-07-26

## 2018-07-23 RX ORDER — MANNITOL 250 MG/ML
INJECTION, SOLUTION INTRAVENOUS
Status: DISCONTINUED | OUTPATIENT
Start: 2018-07-23 | End: 2018-07-23

## 2018-07-23 RX ADMIN — VECURONIUM BROMIDE FOR INJECTION 2 MG: 1 INJECTION, POWDER, LYOPHILIZED, FOR SOLUTION INTRAVENOUS at 07:07

## 2018-07-23 RX ADMIN — CALCIUM CHLORIDE 1000 MG: 100 INJECTION, SOLUTION INTRAVENOUS at 08:07

## 2018-07-23 RX ADMIN — CIPROFLOXACIN 400 MG: 2 INJECTION, SOLUTION INTRAVENOUS at 06:07

## 2018-07-23 RX ADMIN — MANNITOL 100 ML: 250 INJECTION, SOLUTION INTRAVENOUS at 05:07

## 2018-07-23 RX ADMIN — HEPARIN SODIUM 5000 UNITS: 5000 INJECTION, SOLUTION INTRAVENOUS; SUBCUTANEOUS at 11:07

## 2018-07-23 RX ADMIN — LIDOCAINE HYDROCHLORIDE 100 MG: 20 INJECTION, SOLUTION INTRAVENOUS at 04:07

## 2018-07-23 RX ADMIN — CISATRACURIUM BESYLATE 10 MG: 10 INJECTION INTRAVENOUS at 06:07

## 2018-07-23 RX ADMIN — SODIUM BICARBONATE 50 MEQ: 42 INJECTION, SOLUTION INTRAVENOUS at 07:07

## 2018-07-23 RX ADMIN — HEPARIN SODIUM 2000 UNITS: 1000 INJECTION, SOLUTION INTRAVENOUS; SUBCUTANEOUS at 08:07

## 2018-07-23 RX ADMIN — ALBUMIN HUMAN 25 G: 0.05 INJECTION, SOLUTION INTRAVENOUS at 02:07

## 2018-07-23 RX ADMIN — ROCURONIUM BROMIDE 50 MG: 10 INJECTION, SOLUTION INTRAVENOUS at 04:07

## 2018-07-23 RX ADMIN — VECURONIUM BROMIDE FOR INJECTION 3 MG: 1 INJECTION, POWDER, LYOPHILIZED, FOR SOLUTION INTRAVENOUS at 08:07

## 2018-07-23 RX ADMIN — NYSTATIN 500000 UNITS: 500000 SUSPENSION ORAL at 02:07

## 2018-07-23 RX ADMIN — SODIUM CHLORIDE 1 UNITS/HR: 9 INJECTION, SOLUTION INTRAVENOUS at 02:07

## 2018-07-23 RX ADMIN — CALCIUM CHLORIDE 1000 MG: 100 INJECTION, SOLUTION INTRAVENOUS at 11:07

## 2018-07-23 RX ADMIN — ALBUMIN (HUMAN): 25 SOLUTION INTRAVENOUS at 06:07

## 2018-07-23 RX ADMIN — MIDAZOLAM HYDROCHLORIDE 1 MG: 1 INJECTION, SOLUTION INTRAMUSCULAR; INTRAVENOUS at 05:07

## 2018-07-23 RX ADMIN — HYDROMORPHONE HYDROCHLORIDE 0.2 MG: 1 INJECTION, SOLUTION INTRAMUSCULAR; INTRAVENOUS; SUBCUTANEOUS at 08:07

## 2018-07-23 RX ADMIN — SODIUM CHLORIDE: 0.9 INJECTION, SOLUTION INTRAVENOUS at 05:07

## 2018-07-23 RX ADMIN — FENTANYL CITRATE 100 MCG: 50 INJECTION, SOLUTION INTRAMUSCULAR; INTRAVENOUS at 04:07

## 2018-07-23 RX ADMIN — SODIUM CHLORIDE, SODIUM GLUCONATE, SODIUM ACETATE, POTASSIUM CHLORIDE, MAGNESIUM CHLORIDE, SODIUM PHOSPHATE, DIBASIC, AND POTASSIUM PHOSPHATE: .53; .5; .37; .037; .03; .012; .00082 INJECTION, SOLUTION INTRAVENOUS at 09:07

## 2018-07-23 RX ADMIN — SODIUM CHLORIDE: 0.9 INJECTION, SOLUTION INTRAVENOUS at 04:07

## 2018-07-23 RX ADMIN — NYSTATIN 500000 UNITS: 500000 SUSPENSION ORAL at 05:07

## 2018-07-23 RX ADMIN — FLUCONAZOLE 400 MG: 2 INJECTION, SOLUTION INTRAVENOUS at 08:07

## 2018-07-23 RX ADMIN — VASOPRESSIN 0.04 UNITS/MIN: 20 INJECTION INTRAVENOUS at 05:07

## 2018-07-23 RX ADMIN — SODIUM CHLORIDE, SODIUM GLUCONATE, SODIUM ACETATE, POTASSIUM CHLORIDE, MAGNESIUM CHLORIDE, SODIUM PHOSPHATE, DIBASIC, AND POTASSIUM PHOSPHATE: .53; .5; .37; .037; .03; .012; .00082 INJECTION, SOLUTION INTRAVENOUS at 11:07

## 2018-07-23 RX ADMIN — VASOPRESSIN 2 UNITS: 20 INJECTION, SOLUTION INTRAMUSCULAR; SUBCUTANEOUS at 06:07

## 2018-07-23 RX ADMIN — MIDAZOLAM HYDROCHLORIDE 1 MG: 1 INJECTION, SOLUTION INTRAMUSCULAR; INTRAVENOUS at 04:07

## 2018-07-23 RX ADMIN — FUROSEMIDE 100 MG: 10 INJECTION, SOLUTION INTRAMUSCULAR; INTRAVENOUS at 05:07

## 2018-07-23 RX ADMIN — SODIUM BICARBONATE 50 MEQ: 42 INJECTION, SOLUTION INTRAVENOUS at 08:07

## 2018-07-23 RX ADMIN — VECURONIUM BROMIDE FOR INJECTION 4 MG: 1 INJECTION, POWDER, LYOPHILIZED, FOR SOLUTION INTRAVENOUS at 12:07

## 2018-07-23 RX ADMIN — TRIAMCINOLONE ACETONIDE: 1 CREAM TOPICAL at 10:07

## 2018-07-23 RX ADMIN — VANCOMYCIN HYDROCHLORIDE 1.5 G: 1 INJECTION, POWDER, LYOPHILIZED, FOR SOLUTION INTRAVENOUS at 06:07

## 2018-07-23 RX ADMIN — ESMOLOL HYDROCHLORIDE 10 MG: 10 INJECTION INTRAVENOUS at 06:07

## 2018-07-23 RX ADMIN — CALCIUM CHLORIDE 1000 MG: 100 INJECTION, SOLUTION INTRAVENOUS at 07:07

## 2018-07-23 RX ADMIN — CIPROFLOXACIN 400 MG: 2 INJECTION, SOLUTION INTRAVENOUS at 05:07

## 2018-07-23 RX ADMIN — VASOPRESSIN 1 UNITS: 20 INJECTION, SOLUTION INTRAMUSCULAR; SUBCUTANEOUS at 09:07

## 2018-07-23 RX ADMIN — METHYLPREDNISOLONE SODIUM SUCCINATE 500 MG: 500 INJECTION, POWDER, FOR SOLUTION INTRAMUSCULAR; INTRAVENOUS at 06:07

## 2018-07-23 RX ADMIN — ALBUMIN HUMAN 25 G: 0.05 INJECTION, SOLUTION INTRAVENOUS at 05:07

## 2018-07-23 RX ADMIN — FENTANYL CITRATE 100 MCG: 50 INJECTION, SOLUTION INTRAMUSCULAR; INTRAVENOUS at 12:07

## 2018-07-23 RX ADMIN — Medication 1 MG: at 05:07

## 2018-07-23 RX ADMIN — PHENYLEPHRINE HYDROCHLORIDE 500 MCG: 10 INJECTION INTRAVENOUS at 08:07

## 2018-07-23 RX ADMIN — OCTREOTIDE ACETATE 100 MCG: 100 INJECTION, SOLUTION INTRAVENOUS; SUBCUTANEOUS at 11:07

## 2018-07-23 RX ADMIN — SODIUM CHLORIDE, SODIUM GLUCONATE, SODIUM ACETATE, POTASSIUM CHLORIDE, MAGNESIUM CHLORIDE, SODIUM PHOSPHATE, DIBASIC, AND POTASSIUM PHOSPHATE: .53; .5; .37; .037; .03; .012; .00082 INJECTION, SOLUTION INTRAVENOUS at 10:07

## 2018-07-23 RX ADMIN — LEVETIRACETAM 500 MG: 5 INJECTION INTRAVENOUS at 11:07

## 2018-07-23 RX ADMIN — VANCOMYCIN HYDROCHLORIDE 1500 MG: 1 INJECTION, POWDER, LYOPHILIZED, FOR SOLUTION INTRAVENOUS at 06:07

## 2018-07-23 RX ADMIN — PHENYLEPHRINE HYDROCHLORIDE 200 MCG: 10 INJECTION INTRAVENOUS at 05:07

## 2018-07-23 RX ADMIN — SODIUM BICARBONATE 50 MEQ: 42 INJECTION, SOLUTION INTRAVENOUS at 09:07

## 2018-07-23 RX ADMIN — PHENYLEPHRINE HYDROCHLORIDE 100 MCG: 10 INJECTION INTRAVENOUS at 04:07

## 2018-07-23 RX ADMIN — Medication 0.06 MCG/KG/MIN: at 06:07

## 2018-07-23 RX ADMIN — CISATRACURIUM BESYLATE 10 MG: 10 INJECTION INTRAVENOUS at 04:07

## 2018-07-23 RX ADMIN — CALCIUM CHLORIDE 1000 MG: 100 INJECTION, SOLUTION INTRAVENOUS at 09:07

## 2018-07-23 RX ADMIN — PROPOFOL 30 MG: 10 INJECTION, EMULSION INTRAVENOUS at 09:07

## 2018-07-23 RX ADMIN — PHENYLEPHRINE HYDROCHLORIDE 200 MCG: 10 INJECTION INTRAVENOUS at 06:07

## 2018-07-23 RX ADMIN — PHYTONADIONE 10 MG: 10 INJECTION, EMULSION INTRAMUSCULAR; INTRAVENOUS; SUBCUTANEOUS at 02:07

## 2018-07-23 RX ADMIN — NYSTATIN 500000 UNITS: 500000 SUSPENSION ORAL at 09:07

## 2018-07-23 RX ADMIN — HEPARIN SODIUM 1000 UNITS: 1000 INJECTION, SOLUTION INTRAVENOUS; SUBCUTANEOUS at 08:07

## 2018-07-23 RX ADMIN — Medication 1000 MG: at 02:07

## 2018-07-23 RX ADMIN — SODIUM CHLORIDE, SODIUM GLUCONATE, SODIUM ACETATE, POTASSIUM CHLORIDE, MAGNESIUM CHLORIDE, SODIUM PHOSPHATE, DIBASIC, AND POTASSIUM PHOSPHATE: .53; .5; .37; .037; .03; .012; .00082 INJECTION, SOLUTION INTRAVENOUS at 08:07

## 2018-07-23 RX ADMIN — HEPARIN SODIUM 500 UNITS: 1000 INJECTION, SOLUTION INTRAVENOUS; SUBCUTANEOUS at 06:07

## 2018-07-23 RX ADMIN — CISATRACURIUM BESYLATE 10 MG: 10 INJECTION INTRAVENOUS at 05:07

## 2018-07-23 RX ADMIN — PHENYLEPHRINE HYDROCHLORIDE 200 MCG: 10 INJECTION INTRAVENOUS at 09:07

## 2018-07-23 RX ADMIN — DEXTROSE AND SODIUM CHLORIDE: 5; .45 INJECTION, SOLUTION INTRAVENOUS at 01:07

## 2018-07-23 RX ADMIN — PHYTONADIONE 10 MG: 10 INJECTION, EMULSION INTRAMUSCULAR; INTRAVENOUS; SUBCUTANEOUS at 11:07

## 2018-07-23 RX ADMIN — OCTREOTIDE ACETATE 100 MCG: 100 INJECTION, SOLUTION INTRAVENOUS; SUBCUTANEOUS at 02:07

## 2018-07-23 RX ADMIN — CEFTRIAXONE SODIUM 2 G: 2 INJECTION, POWDER, FOR SOLUTION INTRAMUSCULAR; INTRAVENOUS at 05:07

## 2018-07-23 RX ADMIN — VASOPRESSIN 2 UNITS/HR: 20 INJECTION INTRAVENOUS at 06:07

## 2018-07-23 RX ADMIN — VECURONIUM BROMIDE FOR INJECTION 5 MG: 1 INJECTION, POWDER, LYOPHILIZED, FOR SOLUTION INTRAVENOUS at 09:07

## 2018-07-23 RX ADMIN — SODIUM CHLORIDE, SODIUM GLUCONATE, SODIUM ACETATE, POTASSIUM CHLORIDE, MAGNESIUM CHLORIDE, SODIUM PHOSPHATE, DIBASIC, AND POTASSIUM PHOSPHATE: .53; .5; .37; .037; .03; .012; .00082 INJECTION, SOLUTION INTRAVENOUS at 12:07

## 2018-07-23 RX ADMIN — MORPHINE 450 MG: 10 SOLUTION ORAL at 02:07

## 2018-07-23 RX ADMIN — HYDROMORPHONE HYDROCHLORIDE 0.2 MG: 1 INJECTION, SOLUTION INTRAMUSCULAR; INTRAVENOUS; SUBCUTANEOUS at 11:07

## 2018-07-23 RX ADMIN — VASOPRESSIN 1 UNITS: 20 INJECTION, SOLUTION INTRAMUSCULAR; SUBCUTANEOUS at 08:07

## 2018-07-23 RX ADMIN — PROPOFOL 70 MG: 10 INJECTION, EMULSION INTRAVENOUS at 04:07

## 2018-07-23 RX ADMIN — FAMOTIDINE 20 MG: 10 INJECTION, SOLUTION INTRAVENOUS at 09:07

## 2018-07-23 RX ADMIN — ALBUMIN (HUMAN): 25 SOLUTION INTRAVENOUS at 11:07

## 2018-07-23 RX ADMIN — ALBUMIN HUMAN 12.5 G: 0.25 SOLUTION INTRAVENOUS at 08:07

## 2018-07-23 RX ADMIN — SODIUM CHLORIDE 0.25 MCG/KG/MIN: 9 INJECTION, SOLUTION INTRAVENOUS at 06:07

## 2018-07-23 RX ADMIN — VECURONIUM BROMIDE FOR INJECTION 3 MG: 1 INJECTION, POWDER, LYOPHILIZED, FOR SOLUTION INTRAVENOUS at 11:07

## 2018-07-23 RX ADMIN — Medication 1000 MG: at 11:07

## 2018-07-23 RX ADMIN — VECURONIUM BROMIDE FOR INJECTION 3 MG: 1 INJECTION, POWDER, LYOPHILIZED, FOR SOLUTION INTRAVENOUS at 10:07

## 2018-07-23 NOTE — PLAN OF CARE
Problem: Patient Care Overview  Goal: Plan of Care Review  Recommendations  Recommendation/Intervention:   1. When able to extubate, ADAT to Diabetic with texture per SLP.   2. If unable to extubate, RD to provide TF recommendations.   3. TSU RD to provide post-transplant diet education when appropriate.   RD to monitor.    Goals: Patient to receive nutrition by RD follow-up  Nutrition Goal Status: new    Full assessment completed, see RD Note 7/23/2018.

## 2018-07-23 NOTE — OR NURSING
When drapes down BMx1, cleaned per surgery/anesthesia staff.      Family updated throughout procedure via nurse liaison.    Vessels labeled per protocol and placed with donor tissues in blood bank ice chest.  Delivered to blood bank by LASHAUN Billingsley RN.

## 2018-07-23 NOTE — PROGRESS NOTES
Intra-operative CRRT initiated. Arterial to right femoral catheter, venous return to RIJ trialysis. Good flows noted. Heparin 500 units given per anesthesia. Will continue to monitor.

## 2018-07-23 NOTE — PROGRESS NOTES
[facility]  Nephrology  Progress Note    Patient Name: Alon Adamson  MRN: [id number]  Admission Date: 6/22/2018  Hospital Length of Stay: 31 days  Attending Provider: Williams Moon Jr., MD   Primary Care Physician: Mckinley Vides MD  Principal Problem:Acute kidney injury superimposed on chronic kidney disease    Subjective:     HPI: Mr. Adamson is 63 yr old male with decompensated alcoholic cirrhosis, CKD III and CAD admitted here on 06/22/18 admitted for bilateral LE hypervolemia and VICKY on CKD III with cr of 2.3 baseline around 1.5. Patient has multiple hospitalization in the past secondary to decompensated liver failure. Patient is currently on transplant team. During current admission patient was getting lasix and albumin intermittently for VICKY however renal function was not getting better. Patient hospital course complicated by hepatic encephalopathy with some improvement of mentation with lactulose. Also treated for cellulitis with 7 days of vancomycin.Nephrology is consulted for worsening/not improving VICKY despite lasix/albumin.     Per chart review patient has no hypotensive episodes, BP mostly above 100's. Has not received nephrotoxins such as NSAIDs/contrast media. No sever sepsis/septic shock or acute blood loss during current admit. UA with 2+ leukocytes and Hyaline casts, no wbc/rbc cast or proteinuria. Urine Na+ < 20.    Patient was transferred to ICU on 7/13/2018 after being more lethargic and hypoactive.  After two days was stepped down back to [facility].       Interval History: Patient had liver transplant today. V/s are stable, still unconscious.Will start CRRT.    Review of patient's allergies indicates:   Allergen Reactions    Nsaids (non-steroidal anti-inflammatory drug)      D/t to liver disease.    Tylenol [acetaminophen]      D/t liver disease.    Penicillins Other (See Comments)     Tolerated pip-tazo in 8/2016 without issue  Since childhood was told not to  take it     Current Facility-Administered Medications   Medication Frequency    0.9%  NaCl infusion (CRRT USE ONLY) Continuous    0.9%  NaCl infusion (for blood administration) Q24H PRN    0.9%  NaCl infusion (for blood administration) Q24H PRN    acetaminophen tablet 650 mg Q8H PRN    ascorbic acid (vitamin C) tablet 1,000 mg Daily    atorvastatin tablet 40 mg Daily    ceFEPIme injection 1 g Q12H    vancomycin (VANCOCIN) 1,500 mg in dextrose 5 % 250 mL IVPB Q12H    And    ciprofloxacin (CIPRO)400mg/200ml D5W IVPB 400 mg Q12H    dextrose 5 % and 0.45 % NaCl infusion Continuous    dextrose 50% injection 12.5 g PRN    dextrose 50% injection 25 g PRN    diphenhydrAMINE-zinc acetate 1-0.1% cream TID PRN    docusate sodium capsule 100 mg Daily    ergocalciferol capsule 50,000 Units Q7 Days    famotidine (PF) injection 20 mg Q12H    fluconazole tablet 200 mg Daily    heparin (porcine) injection 5,000 Units Q8H    HYDROmorphone injection 0.2 mg Q2H PRN    lactulose 10 gram/15 mL solution (enema) 200 g Q6H PRN    lactulose 20 gram/30 mL solution Soln 45 g TID    levETIRAcetam tablet 500 mg BID    lidocaine 5 % patch 2 patch Q24H    magnesium sulfate 2g in water 50mL IVPB (premix) PRN    [START ON 7/24/2018] methylPREDNISolone sodium succinate injection 100 mg Q12H    Followed by    [START ON 7/25/2018] methylPREDNISolone sodium succinate injection 80 mg Q12H    Followed by    [START ON 7/26/2018] methylPREDNISolone sodium succinate injection 60 mg Q12H    Followed by    [START ON 7/27/2018] methylPREDNISolone sodium succinate injection 40 mg Q12H    Followed by    [START ON 7/28/2018] methylPREDNISolone sodium succinate injection 20 mg Q12H    Followed by    [START ON 7/29/2018] predniSONE tablet 20 mg Daily    metoclopramide HCl injection 10 mg QID (AC + HS) PRN    mycophenolate mofetil 200 mg/mL suspension 1,000 mg BID    nystatin 100,000 unit/mL suspension 500,000 Units QID    octreotide  injection 100 mcg Q8H    omega-3 acid ethyl esters capsule 2 g Daily with breakfast    ondansetron injection 4 mg Q8H PRN    PHENYLephrine (KATHE-SYNEPHRINE) 40 mg in sodium chloride 0.9% 250ml (titrating) (premix) Continuous    phytonadione vitamin k (AQUA-MEPHYTON) 10 mg in dextrose 5 % 50 mL IVPB Q8H    rifAXIMin tablet 550 mg BID    sertraline tablet 50 mg Daily    sodium bicarbonate tablet 1,950 mg TID    sodium chloride 0.9% flush 3 mL PRN    sodium phosphate 20.01 mmol in dextrose 5 % 250 mL IVPB PRN    sodium phosphate 30 mmol in dextrose 5 % 250 mL IVPB PRN    sodium phosphate 39.99 mmol in dextrose 5 % 250 mL IVPB PRN    sulfamethoxazole-trimethoprim 400-80mg per tablet 1 tablet Daily    tacrolimus (PROGRAF) 1 mg/mL oral syringe BID    triamcinolone acetonide 0.1% cream BID    valganciclovir 50 mg/ml oral solution 450 mg Daily    vasopressin (PITRESSIN) 0.2 Units/mL in dextrose 5 % 100 mL infusion Continuous    zinc sulfate capsule 220 mg BID       Objective:     Vital Signs (Most Recent):  Temp: 97.5 °F (36.4 °C) (07/23/18 1325)  Pulse: 66 (07/23/18 1325)  Resp: 14 (/ 100% 15PS/5 PEEP) (07/23/18 1325)  BP: (!) 130/43 (07/23/18 1325)  SpO2: 100 % (07/23/18 1325)  O2 Device (Oxygen Therapy): room air (07/23/18 0348) Vital Signs (24h Range):  Temp:  [97.5 °F (36.4 °C)-98.8 °F (37.1 °C)] 97.5 °F (36.4 °C)  Pulse:  [66-97] 66  Resp:  [14-20] 14  SpO2:  [94 %-100 %] 100 %  BP: ()/(29-56) 130/43     Weight: 126.3 kg (278 lb 7.1 oz) (07/20/18 0700)  Body mass index is 35.75 kg/m².  Body surface area is 2.57 meters squared.    I/O last 3 completed shifts:  In: 4456 [P.O.:2171; I.V.:1300; Blood:985]  Out: 760 [Urine:760]    Physical Exam   Constitutional: He appears well-developed and well-nourished. No distress.   Neck: Neck supple. No JVD present.   Cardiovascular: Normal rate, regular rhythm and normal heart sounds.    Pulmonary/Chest: Effort normal and breath sounds normal. No  respiratory distress.   intubated   Abdominal:   Incision in upper quadrants, Liver transplant surgery    Neurological:   Unconscious, intubated   Vitals reviewed.            Assessment/Plan:     * Acute kidney injury superimposed on chronic kidney disease    64 yo male with acute decompensated liver failure secondary to alcohol cirrhosis now with sCr 1.7, showing improvement.  Nephrology consulted for VICKY.  Etiology most likely pre-renal.  Also considered HRS but less likely given optimal BP and renal function labs.     -sCr at baseline 1.4-1.5, sCr stable at 1.7 this morning, UOP increasing and about 30ccs per hour this morning  - continue to monitor serial RFPs and strict Is & Os  - Avoid nephrotoxic medications  - Maintain MAP >65  - Hb > 7gm/dL  - ABGs, U/A, Labs in AM   - CRRT                    Thank you for your consult. I will follow-up with patient. Please contact us if you have any additional questions.    Chichi Urbina MD  Nephrology  [facility]

## 2018-07-23 NOTE — SUBJECTIVE & OBJECTIVE
Follow-up For: Procedure(s) (LRB):  TRANSPLANT, LIVER (N/A)    Post-Operative Day: Day of Surgery     Past Medical History:   Diagnosis Date    Anticoagulant long-term use     Arthritis     Back pain     Cellulitis     Cirrhosis     Coronary artery disease     DM (diabetes mellitus)     Hearing loss     right ear    Hepatic encephalopathy     Hypertension     diagnosed today (11/25/2015) and prescribed toprol    Leg edema     Nephrolithiasis     JACK (obstructive sleep apnea)     Seizures     Splenomegaly        Past Surgical History:   Procedure Laterality Date    APPENDECTOMY      Open    BACK SURGERY      CHOLECYSTECTOMY      laprascopic    COLONOSCOPY N/A 1/25/2018    Procedure: COLONOSCOPY;  Surgeon: Lashawn Myles MD;  [facility]: [facility] ([address] [place] [address]);  Service: Endoscopy;  Laterality: N/A;    LUMBAR DISCECTOMY      SPLENIC ARTERY EMBOLIZATION  04/08/2016    Dr Taveras    TONSILLECTOMY         Review of patient's allergies indicates:   Allergen Reactions    Nsaids (non-steroidal anti-inflammatory drug)      D/t to liver disease.    Tylenol [acetaminophen]      D/t liver disease.    Penicillins Other (See Comments)     Tolerated pip-tazo in 8/2016 without issue  Since childhood was told not to take it       Family History     None        Social History Main Topics    Smoking status: Never Smoker    Smokeless tobacco: Current User     Types: Chew    Alcohol use No    Drug use: No    Sexual activity: Not on file      Review of Systems   Unable to perform ROS: Intubated     Objective:     Vital Signs (Most Recent):  Temp: 96.4 °F (35.8 °C) (07/23/18 1400)  Pulse: 76 (07/23/18 1400)  Resp: 17 (07/23/18 1400)  BP: (!) 100/52 (07/23/18 1400)  SpO2: 99 % (07/23/18 1400) Vital Signs (24h Range):  Temp:  [96.4 °F (35.8 °C)-98.8 °F (37.1 °C)] 96.4 °F (35.8 °C)  Pulse:  [65-97] 76  Resp:  [14-20] 17  SpO2:  [94 %-100 %] 99 %  BP: ()/(29-56) 100/52  Arterial Line BP: (107-163)/(42-51)  107/42     Weight: 126.3 kg (278 lb 7.1 oz)  Body mass index is 35.75 kg/m².      Intake/Output Summary (Last 24 hours) at 07/23/18 1423  Last data filed at 07/23/18 1400   Gross per 24 hour   Intake            18547 ml   Output             5791 ml   Net            16398 ml       Physical Exam   Constitutional: He appears well-developed and well-nourished. He appears distressed.   HENT:   Head: Normocephalic and atraumatic.   Eyes: Pupils are equal, round, and reactive to light.   Neck: Neck supple. No JVD present. No tracheal deviation present.   Cardiovascular: Normal rate, regular rhythm and normal heart sounds.    Pulmonary/Chest: Effort normal and breath sounds normal. No respiratory distress.   intubated   Abdominal: Soft. Bowel sounds are normal. He exhibits no distension. There is no tenderness. There is no guarding.   Incision in upper quadrants, Liver transplant surgery    Neurological:   Unconscious, intubated   Skin: He is not diaphoretic.   Vitals reviewed.      Vents:  Vent Mode: SIMV (07/23/18 1343)  Set Rate: 16 bmp (07/23/18 1343)  Vt Set: 500 mL (07/23/18 1343)  Pressure Support: 10 cmH20 (07/23/18 1343)  PEEP/CPAP: 5 cmH20 (07/23/18 1343)  Oxygen Concentration (%): 51 (07/23/18 1400)  Peak Airway Pressure: 19 cmH2O (07/23/18 1343)  Plateau Pressure: 0 cmH20 (07/23/18 1343)  Total Ve: 8.38 mL (07/23/18 1343)  F/VT Ratio<105 (RSBI): (!) 35.09 (07/23/18 1343)    Lines/Drains/Airways     Central Venous Catheter Line                 Introducer 07/23/18 0500 right internal jugular less than 1 day         Percutaneous Central Line Insertion/Assessment - double lumen  07/23/18 0500 right internal jugular less than 1 day         Percutaneous Central Line Insertion/Assessment - double lumen  07/23/18 0512 right femoral less than 1 day         Percutaneous Central Line Insertion/Assessment - triple lumen  07/23/18 0530 right internal jugular less than 1 day         Pulmonary Artery Catheter Assessment   07/23/18 0530 less than 1 day          Drain                 Urethral Catheter 07/21/18 1706 1 day         Closed/Suction Drain 07/23/18 Right;Lateral Abdomen Bulb less than 1 day         Closed/Suction Drain 07/23/18 Right;Medial Abdomen Bulb less than 1 day         NG/OG Tube 07/23/18 orogastric less than 1 day          Airway                 Airway - Non-Surgical 07/23/18 0451 Endotracheal Tube less than 1 day          Arterial Line                 Arterial Line 07/23/18 0458 Left Radial less than 1 day         Arterial Line 07/23/18 0500 Right Femoral less than 1 day          Peripheral Intravenous Line                 Midline Catheter Insertion/Assessment  - Single Lumen 07/20/18 1128 Right cephalic vein (lateral side of arm) 18g x 10cm 3 days         Peripheral IV - Single Lumen 07/23/18 0511 Left Antecubital less than 1 day                Significant Labs:    CBC/Anemia Profile:    Recent Labs  Lab 07/22/18  0505 07/22/18  1729  07/23/18  1100 07/23/18  1210 07/23/18  1332 07/23/18  1333   WBC 3.06* 3.26*  --   --   --   --  10.10   HGB 8.1* 7.8*  < > 11.5* 9.3*  --  9.7*   HCT 25.0* 25.1*  < > 35.4* 28.0* 25* 29.5*   PLT 30* 29*  < > 81* 53*  --  38*   * 106*  --   --   --   --  98   RDW 21.8* 21.9*  --   --   --   --  18.6*   < > = values in this interval not displayed.     Chemistries:    Recent Labs  Lab 07/22/18  0505 07/22/18  1320 07/22/18  1729 07/22/18  2140  07/23/18  0945 07/23/18  1100 07/23/18  1210   * 149* 149* 149*  < > 148* 145 143   K 4.6 4.8 4.7 4.6  < > 3.4* 4.0 4.0   * 121* 121* 121*  --   --   --   --    CO2 18* 17* 17* 17*  --   --   --   --    BUN 74* 73* 76* 79*  --   --   --   --    CREATININE 4.1* 4.5* 4.6* 4.7*  --   --   --   --    CALCIUM 9.4 9.4 9.5 9.7  --   --   --   --    ALBUMIN 2.6*  --  2.6*  --   < > 1.4* 1.5* 1.8*   PROT 5.2*  --  5.2*  --   --   --   --   --    BILITOT 2.0*  --  1.6*  --   --   --   --   --    ALKPHOS 91  --  90  --   --   --   --    --    ALT 38  --  39  --   --  208*  --   --    AST 63*  --  61*  --   --  355*  --   --    MG 3.3*  --  3.4*  --   < > 2.2 2.4 2.2   PHOS 5.7*  --   --   --   --   --   --   --    < > = values in this interval not displayed.    All pertinent labs within the past 24 hours have been reviewed.    Significant Imaging: I have reviewed all pertinent imaging results/findings within the past 24 hours.

## 2018-07-23 NOTE — H&P
[facility]  Critical Care - Surgery  History & Physical    Patient Name: Alon Adamson  MRN: [id number]  Admission Date: 6/22/2018  Code Status: Full Code  Attending Physician: Williams Moon Jr., MD   Primary Care Provider: Mckinley Vides MD   Principal Problem: Acute kidney injury superimposed on chronic kidney disease    Subjective:     HPI:  Alon Adamson is a 63 y.o. male with hx of ESLD secondary to EtOH cirrhosis with severe complications (hyperbilirubinemia, hypoalbuminemia, severe malnutrition, hepatic encephalopathy, thrombocytopenia, splenomegaly, ascites, hypervolemia, coaguloopathy, portal HTN), seizure disorder on Keppra, CKD3, CAD. Admitted to hospital on 6/22/18 and has had prolonged stay prior to transplant, mostly due to HE. Patient is now s/p OLT on 7/23/18. He underwent intra-op CRRT. Received 9 units PRBC, 2 FFP, 2 cryo, 2 platelets, 4.4 L cell saver. Brought to the ICU intubated and off pressors.           Hospital/ICU Course:  Per tranplant, Patient is listed for liver transplant, placed on internal hold 6/25 for HE then status 7 on 6/26 due to change in prescription coverage. He was re-activated on the list 7/3, MELD 22. Pt started on IV diuretics 6/22 and continued 6/23 and 6/24.  BLE edema and ascites sign if improved with diuresis and kidney function also improved. During admission, patient had worsening encephalopathy and asterixis prompting infectious work up, all blood and urine cultures obtained during admission with NGTD. Intermittently, encephalopathy severe enough to warrant lactulose enemas. CT head obtained and negative. Paracentesis 6/25 negative for peritonitis per cell count and U/A negative. Pt also with concern for BLE cellulitis- upper legs bright red with lower BLE still with dark appearance of venous stasis. Placed on vancomycin with improvement in BLE redness 6/26. Vanc continued for 7 days (ended 7/1). His HE waxed and waned for several  days requiring lactulose enemas. Pt eating minimally during period of encephalopathy requiring gentle hydration with IVF 6/26. Of note, taco placement attempted 6/26 but unable secondary to agitation upon insertion prompting self resolving nose bleed. Micro lab called 6/26 PM with blood cx + for G+C in blood- pt continued on vanc which was started 6/25. ID consulted, suspect + blood cultures contaminant. EEG obtained 6/26 as pt with h/o serizures and negative. Pt continued on home Keppra. VICKY on admission initially improved with diuresis, but Cr sakina intermittently during admission likely related to aggressive diuresis. Of note, with chest pain overnight 6/26-6/27 that resolved without intervention and pt reported as mild.  EKG NSR with PVC, troponin 0.1 in setting of hypervolemia + VICKY.  Normal dobutamine stress 1/2018.  Cardiology consulted and felt not ACS, no need for further workup other than continue to treat current risk factors for CAD with ASA (pt already on ASA and statin as outpt) + BB for portal hypertension which was started. Repeat paracentesis 7/10, 4.8L off, no SBP. Hypernatremia noted 7/9, nephrology consulted to aid in management. Hypernatremia attributed to dehydration from frequent BM's, diuretics were held, d/c'd creon due to excessive diarrhea, and patient was treated with increasing po free water and IVF. Echo 7/9 with normal EF, no wall abnormality, mild tricuspid/mitral regurg.     Overnight from 7/12 to 7/13, pt became hypothermic to 91.2 corrected with warming blankets, experienced worsening encephalopathy, hypotensive to 80/40.  Transferred to SICU.  Corrected with albumin 5% bolus.  Given additional lactulose.    7/14- no acute issues, ammonia level decreased, mentation improving    7/23-OLT    Follow-up For: Procedure(s) (LRB):  TRANSPLANT, LIVER (N/A)    Post-Operative Day: Day of Surgery     Past Medical History:   Diagnosis Date    Anticoagulant long-term use     Arthritis     Back  pain     Cellulitis     Cirrhosis     Coronary artery disease     DM (diabetes mellitus)     Hearing loss     right ear    Hepatic encephalopathy     Hypertension     diagnosed today (11/25/2015) and prescribed toprol    Leg edema     Nephrolithiasis     JACK (obstructive sleep apnea)     Seizures     Splenomegaly        Past Surgical History:   Procedure Laterality Date    APPENDECTOMY      Open    BACK SURGERY      CHOLECYSTECTOMY      laprascopic    COLONOSCOPY N/A 1/25/2018    Procedure: COLONOSCOPY;  Surgeon: Lashawn Myles MD;  Location: [facility] ([address] [place] [address]);  Service: Endoscopy;  Laterality: N/A;    LUMBAR DISCECTOMY      SPLENIC ARTERY EMBOLIZATION  04/08/2016    Dr Taveras    TONSILLECTOMY         Review of patient's allergies indicates:   Allergen Reactions    Nsaids (non-steroidal anti-inflammatory drug)      D/t to liver disease.    Tylenol [acetaminophen]      D/t liver disease.    Penicillins Other (See Comments)     Tolerated pip-tazo in 8/2016 without issue  Since childhood was told not to take it       Family History     None        Social History Main Topics    Smoking status: Never Smoker    Smokeless tobacco: Current User     Types: Chew    Alcohol use No    Drug use: No    Sexual activity: Not on file      Review of Systems   Unable to perform ROS: Intubated     Objective:     Vital Signs (Most Recent):  Temp: 96.4 °F (35.8 °C) (07/23/18 1400)  Pulse: 76 (07/23/18 1400)  Resp: 17 (07/23/18 1400)  BP: (!) 100/52 (07/23/18 1400)  SpO2: 99 % (07/23/18 1400) Vital Signs (24h Range):  Temp:  [96.4 °F (35.8 °C)-98.8 °F (37.1 °C)] 96.4 °F (35.8 °C)  Pulse:  [65-97] 76  Resp:  [14-20] 17  SpO2:  [94 %-100 %] 99 %  BP: ()/(29-56) 100/52  Arterial Line BP: (107-163)/(42-51) 107/42     Weight: 126.3 kg (278 lb 7.1 oz)  Body mass index is 35.75 kg/m².      Intake/Output Summary (Last 24 hours) at 07/23/18 1423  Last data filed at 07/23/18 1400   Gross per 24 hour   Intake             43880 ml   Output             5791 ml   Net            74924 ml       Physical Exam   Constitutional: He appears well-developed and well-nourished. He appears distressed.   HENT:   Head: Normocephalic and atraumatic.   Eyes: Pupils are equal, round, and reactive to light.   Neck: Neck supple. No JVD present. No tracheal deviation present.   Cardiovascular: Normal rate, regular rhythm and normal heart sounds.    Pulmonary/Chest: Effort normal and breath sounds normal. No respiratory distress.   intubated   Abdominal: Soft. Bowel sounds are normal. He exhibits no distension. There is no tenderness. There is no guarding.   Incision in upper quadrants, Liver transplant surgery    Neurological:   Unconscious, intubated   Skin: He is not diaphoretic.   Vitals reviewed.      Vents:  Vent Mode: SIMV (07/23/18 1343)  Set Rate: 16 bmp (07/23/18 1343)  Vt Set: 500 mL (07/23/18 1343)  Pressure Support: 10 cmH20 (07/23/18 1343)  PEEP/CPAP: 5 cmH20 (07/23/18 1343)  Oxygen Concentration (%): 51 (07/23/18 1400)  Peak Airway Pressure: 19 cmH2O (07/23/18 1343)  Plateau Pressure: 0 cmH20 (07/23/18 1343)  Total Ve: 8.38 mL (07/23/18 1343)  F/VT Ratio<105 (RSBI): (!) 35.09 (07/23/18 1343)    Lines/Drains/Airways     Central Venous Catheter Line                 Introducer 07/23/18 0500 right internal jugular less than 1 day         Percutaneous Central Line Insertion/Assessment - double lumen  07/23/18 0500 right internal jugular less than 1 day         Percutaneous Central Line Insertion/Assessment - double lumen  07/23/18 0512 right femoral less than 1 day         Percutaneous Central Line Insertion/Assessment - triple lumen  07/23/18 0530 right internal jugular less than 1 day         Pulmonary Artery Catheter Assessment  07/23/18 0530 less than 1 day          Drain                 Urethral Catheter 07/21/18 1706 1 day         Closed/Suction Drain 07/23/18 Right;Lateral Abdomen Bulb less than 1 day         Closed/Suction Drain  07/23/18 Right;Medial Abdomen Bulb less than 1 day         NG/OG Tube 07/23/18 orogastric less than 1 day          Airway                 Airway - Non-Surgical 07/23/18 0451 Endotracheal Tube less than 1 day          Arterial Line                 Arterial Line 07/23/18 0458 Left Radial less than 1 day         Arterial Line 07/23/18 0500 Right Femoral less than 1 day          Peripheral Intravenous Line                 Midline Catheter Insertion/Assessment  - Single Lumen 07/20/18 1128 Right cephalic vein (lateral side of arm) 18g x 10cm 3 days         Peripheral IV - Single Lumen 07/23/18 0511 Left Antecubital less than 1 day                Significant Labs:    CBC/Anemia Profile:    Recent Labs  Lab 07/22/18  0505 07/22/18  1729  07/23/18  1100 07/23/18  1210 07/23/18  1332 07/23/18  1333   WBC 3.06* 3.26*  --   --   --   --  10.10   HGB 8.1* 7.8*  < > 11.5* 9.3*  --  9.7*   HCT 25.0* 25.1*  < > 35.4* 28.0* 25* 29.5*   PLT 30* 29*  < > 81* 53*  --  38*   * 106*  --   --   --   --  98   RDW 21.8* 21.9*  --   --   --   --  18.6*   < > = values in this interval not displayed.     Chemistries:    Recent Labs  Lab 07/22/18  0505 07/22/18  1320 07/22/18  1729 07/22/18  2140  07/23/18  0945 07/23/18  1100 07/23/18  1210   * 149* 149* 149*  < > 148* 145 143   K 4.6 4.8 4.7 4.6  < > 3.4* 4.0 4.0   * 121* 121* 121*  --   --   --   --    CO2 18* 17* 17* 17*  --   --   --   --    BUN 74* 73* 76* 79*  --   --   --   --    CREATININE 4.1* 4.5* 4.6* 4.7*  --   --   --   --    CALCIUM 9.4 9.4 9.5 9.7  --   --   --   --    ALBUMIN 2.6*  --  2.6*  --   < > 1.4* 1.5* 1.8*   PROT 5.2*  --  5.2*  --   --   --   --   --    BILITOT 2.0*  --  1.6*  --   --   --   --   --    ALKPHOS 91  --  90  --   --   --   --   --    ALT 38  --  39  --   --  208*  --   --    AST 63*  --  61*  --   --  355*  --   --    MG 3.3*  --  3.4*  --   < > 2.2 2.4 2.2   PHOS 5.7*  --   --   --   --   --   --   --    < > = values in this interval  not displayed.    All pertinent labs within the past 24 hours have been reviewed.    Significant Imaging: I have reviewed all pertinent imaging results/findings within the past 24 hours.    Assessment/Plan:     Hepatic encephalopathy    63 yr old male with decompensated alcoholic cirrhosis, CKD III and CAD. Hospitalized with HE, IVCKY on CKD. S/p OLT on 7/23 with intraop CRRT. Brought to the ICU intubated, not requiring pressor support. Significant blood loss, with aggressive resuscitation intraop    Neuro  -hx seizures and HE  -keppra  -Wean sedation, attempt extubation this afternoon. If unable, propofol/fentanyl infusions    Cardiovascular  - h/o CAD  - 2D Echo 7/9 normal EF (55-60%), trivial tricuspid/mitral regurg  - Not requiring pressor support    Respiratory:  -Intubated, minimal vent settings      Recent Labs  Lab 07/23/18  1332   PH 7.324*   PCO2 42.7   PO2 336*   HCO3 22.2*   POCSATURATED 100   BE -4     Renal:  -VICKY on CKD-intraop CRRT, anuric  -Nephrology following  -[place], BUN/Cr    Liver and Pancreas  -s/p OLT 7/23  -Trend liver enzymes    GI  -NPO  -Replete lytes prn       ID  -Anti-rejection, antiretrovirals  -monitor WBC    Heme  -h/h stable, thrombocytopenia. 14 L blood loss intraop.   RBC (Units) 9   FFP (Units) 2   Cryoprecipitate (Units)  2   Platelets (Units) 2     Dispo  -ICU               Critical care was time spent personally by me on the following activities: development of treatment plan with patient or surrogate and bedside caregivers, discussions with consultants, evaluation of patient's response to treatment, examination of patient, ordering and performing treatments and interventions, ordering and review of laboratory studies, ordering and review of radiographic studies, pulse oximetry, re-evaluation of patient's condition.  This critical care time did not overlap with that of any other provider or involve time for any procedures.     Parker Jackson MD  Critical Care -  Surgery  [facility]

## 2018-07-23 NOTE — ANESTHESIA PROCEDURE NOTES
Arterial    Diagnosis: olt  Doctor requesting consult: gulshan    Patient location during procedure: done in OR  Procedure start time: 7/23/2018 5:02 AM  Timeout: 7/23/2018 5:02 AM  Procedure end time: 7/23/2018 5:22 AM  Staffing  Anesthesiologist: RUDDY SIERRA  Performed: anesthesiologist   Anesthesiologist was present at the time of the procedure.  Preanesthetic Checklist  Completed: patient identified, site marked, surgical consent, pre-op evaluation, timeout performed, IV checked, risks and benefits discussed, monitors and equipment checked and anesthesia consent givenArterial  Skin Prep: chlorhexidine gluconate  Orientation: right  Location: femoral  Catheter Size: 20 G  Catheter placement by Ultrasound guidance. Heme positive aspiration all ports.  Vessel Caliber: medium, patent, compressibility normal  Vascular Doppler:  not done  Needle advanced into vessel with real time Ultrasound guidance.  Guidewire confirmed in vessel.  Sterile sheath used.Insertion Attempts: 1  Assessment  Dressing: sutured in place and taped  Patient: Tolerated well

## 2018-07-23 NOTE — PROGRESS NOTES
Saw patient in the ICU.  No family at bedside.  Left My New Journey: Living Smart After My Liver Transplant in the room for patient and family.

## 2018-07-23 NOTE — PT/OT/SLP DISCHARGE
Physical Therapy Discharge Summary    Name: Alon Adamson  MRN: [id number]   Principal Problem: Acute kidney injury superimposed on chronic kidney disease     Patient Discharged from acute Physical Therapy on 18.  Please refer to prior PT noted date on 18 for functional status.     Assessment:     Patient appropriate for care in another setting. (SICU s/p liver transplant)    Objective:     GOALS:    Physical Therapy Goals        Problem: Physical Therapy Goal    Goal Priority Disciplines Outcome Goal Variances Interventions   Physical Therapy Goal     PT/OT, PT Ongoing (interventions implemented as appropriate)     Description:  Goals to be met by: 18     Patient will increase functional independence with mobility by performin. Supine to sit with Stand-by Assistance.  2. Sit to stand with Supervision.  3. Gait x 200 ft with Supervision with RW.  4. Ascend/descend 5 stair with right Handrails with SBA.  5. Lower extremity exercise program x 20 reps, with supervision, in order to increase LE strength and (I) with functional mobility.                               Reasons for Discontinuation of Therapy Services  s/p liver transplant  and transfer to SICU following       Plan:     Patient Discharged to: SICU s/p liver transplant .    Lucille Craig, PT  2018

## 2018-07-23 NOTE — ANESTHESIA PROCEDURE NOTES
Jean Joshua Line    Diagnosis: olt  Doctor requesting consult: gulshan  Patient location during procedure: done in OR  Procedure start time: 7/23/2018 5:30 AM  Timeout: 7/23/2018 5:02 AM  Procedure end time: 7/23/2018 5:55 AM  Staffing  Anesthesiologist: RUDDY SIERRA  Performed: anesthesiologist   Anesthesiologist was present at the time of the procedure.  Preanesthetic Checklist  Completed: patient identified, site marked, surgical consent, pre-op evaluation, timeout performed, IV checked, risks and benefits discussed, monitors and equipment checked and anesthesia consent given  Jean Joshua Line  Skin Prep: chlorhexidine gluconate  Location: right,  femoral vein  Vessel Caliber: medium, patent, compressibility normal  Vascular Doppler:  not done  Device: standard thermodilution catheter  Catheter Size: 9 Fr  Catheter placement by yes. Heme positive aspiration all ports. PAC floated with balloon up until wedgedSterile sheath used  Locked at: 48 cm.  Indication: intravenous therapy, hemodynamic monitoring  Ultrasound Guidance  Needle advanced into vessel with real time Ultrasound guidance.  Guidewire confirmed in vessel.  Assessment  Central Line Bundle Protocol followed. Hand hygiene before procedure, surgical cap worn, surgical mask worn, sterile surgical gloves worn, large sterile drape used.  Verification: ultrasound and blood return  Dressing: sutured in place and taped  Patient: Tolerated Well

## 2018-07-23 NOTE — ANESTHESIA PROCEDURE NOTES
Arterial    Diagnosis: esld    Patient location during procedure: done in OR  Procedure start time: 7/23/2018 4:58 AM  Staffing  Anesthesiologist: RUDDY SIERRA  Resident/CRNA: LYDIA KLEIN  Performed: resident/CRNA   Anesthesiologist was present at the time of the procedure.Arterial  Skin Prep: chlorhexidine gluconate  Local Infiltration: none  Orientation: left  Location: radial  Catheter Size: 20 G  Catheter placement by Anatomical landmarks. Heme positive aspiration all ports.Insertion Attempts: 1  Assessment  Dressing: secured with tape and tegaderm

## 2018-07-23 NOTE — ANESTHESIA PROCEDURE NOTES
Central Line    Diagnosis: olt  Doctor requesting consult: gulshan  Patient location during procedure: done in OR  Procedure start time: 7/23/2018 5:12 AM  Timeout: 7/23/2018 5:02 AM  Procedure end time: 7/23/2018 5:28 AM  Staffing  Anesthesiologist: RUDDY SIERRA  Performed: anesthesiologist   Anesthesiologist was present at the time of the procedure.  Preanesthetic Checklist  Completed: patient identified, site marked, surgical consent, pre-op evaluation, timeout performed, IV checked, risks and benefits discussed, monitors and equipment checked and anesthesia consent given  Indication  Indication: hemodynamic monitoring, vascular access, med administration, hemodialysis     Anesthesia   general anesthesia    Central Line  Skin Prep: skin prepped with Betadine, skin prep agent completely dried prior to procedure  maximum sterile barriers used during central venous catheter insertion  hand hygiene performed prior to central venous catheter insertion  Location: right femoral,   Catheter type: double lumen  Catheter Size: 12 Fr  Ultrasound: vascular probe with ultrasound  Vessel Caliber: medium, patent  Vascular Doppler:  not done, compressibility normal  Needle advanced into vessel with real time Ultrasound guidance.  Guidewire confirmed in vessel.   Manometry: Venous cannualation confirmed by visual estimation of blood vessel pressure using manometry.  Insertion Attempts: 1   Securement:line sutured, chlorhexidine patch, sterile dressing applied and blood return through all ports     Post-Procedure  Adverse Events:none

## 2018-07-23 NOTE — NURSING
500 cc soap lara enema administered per MD order. Patient tolerated well. Bowel movements x 2 since enema. Liquid brown stool. Bed bath preformed with ordered pre-op soap patient tolerated well. Stating he was just cold. Sheet/bear hugger/and blankets reapplied. Patient tolerated bath well. Family waiting in lobby. Will monitor.

## 2018-07-23 NOTE — SUBJECTIVE & OBJECTIVE
Interval History: no events overnight, pt taken for liver transplant this morning. Pt sedated on vent, discussed w/ family at bedside.    Review of Systems   Unable to perform ROS: Intubated   All other systems reviewed and are negative.    Objective:     Vital Signs (Most Recent):  Temp: (!) 95.7 °F (35.4 °C) (07/23/18 1600)  Pulse: 83 (07/23/18 1630)  Resp: (!) 30 (07/23/18 1630)  BP: (!) 107/53 (07/23/18 1600)  SpO2: 96 % (07/23/18 1630) Vital Signs (24h Range):  Temp:  [95.7 °F (35.4 °C)-98.1 °F (36.7 °C)] 95.7 °F (35.4 °C)  Pulse:  [65-97] 83  Resp:  [13-30] 30  SpO2:  [94 %-100 %] 96 %  BP: ()/(29-57) 107/53  Arterial Line BP: (101-163)/(41-54) 106/45     Weight: 126.3 kg (278 lb 7.1 oz)  Body mass index is 35.75 kg/m².    Estimated Creatinine Clearance: 54.1 mL/min (A) (based on SCr of 2 mg/dL (H)).    Physical Exam   Constitutional: He appears well-developed and well-nourished. No distress.   HENT:   Head: Atraumatic.   Right Ear: External ear normal.   Left Ear: External ear normal.   Nose: Nose normal.   Mouth/Throat: Oropharynx is clear and moist.   Eyes: EOM are normal.   Neck: Normal range of motion. Neck supple.   Cardiovascular: Normal rate, regular rhythm and normal heart sounds.    No murmur heard.  Pulmonary/Chest: Effort normal and breath sounds normal. No respiratory distress. He has no wheezes.   Abdominal: Soft. Bowel sounds are normal. He exhibits no distension. There is no tenderness.   Musculoskeletal: Normal range of motion. He exhibits no edema or deformity.   Neurological: No cranial nerve deficit.   Skin: Skin is dry. No rash noted. He is not diaphoretic.   Chronic skin changes b/l LE, no drainage   Psychiatric: He has a normal mood and affect. His behavior is normal.       Significant Labs:   Bilirubin:     Recent Labs  Lab 07/20/18  0639 07/21/18  0400 07/22/18  0505 07/22/18  1729 07/23/18  1328   BILIDIR  --   --   --  0.9*  --    BILITOT 1.8* 1.6* 2.0* 1.6* 3.0*     Blood  Culture:     Recent Labs  Lab 07/09/18  1218 07/12/18  0943 07/13/18  0822 07/17/18  1130 07/21/18  0910   LABBLOO No growth after 5 days. No growth after 5 days.  No growth after 5 days. No growth after 5 days.  No growth after 5 days. No growth after 5 days. No Growth to date  No Growth to date  No Growth to date  No Growth to date  No Growth to date  No Growth to date     CBC:     Recent Labs  Lab 07/22/18  1729  07/23/18  1210 07/23/18  1332 07/23/18  1333 07/23/18  1601   WBC 3.26*  --   --   --  10.10 11.49   HGB 7.8*  < > 9.3*  --  9.7* 10.4*   HCT 25.1*  < > 28.0* 25* 29.5* 32.7*   PLT 29*  < > 53*  --  38* 38*   < > = values in this interval not displayed.  CMP:     Recent Labs  Lab 07/22/18  0505  07/22/18  1729 07/22/18  2140  07/23/18  0945 07/23/18  1100 07/23/18  1210 07/23/18  1328   *  < > 149* 149*  < > 148* 145 143 143   K 4.6  < > 4.7 4.6  < > 3.4* 4.0 4.0 4.2   *  < > 121* 121*  --   --   --   --  111*   CO2 18*  < > 17* 17*  --   --   --   --  18*   *  < > 183* 158*  < > 155* 171* 161* 185*   BUN 74*  < > 76* 79*  --   --   --   --  28*   CREATININE 4.1*  < > 4.6* 4.7*  --   --   --   --  2.0*   CALCIUM 9.4  < > 9.5 9.7  --   --   --   --  8.1*   PROT 5.2*  --  5.2*  --   --   --   --   --  3.0*   ALBUMIN 2.6*  --  2.6*  --   < > 1.4* 1.5* 1.8* 1.9*   BILITOT 2.0*  --  1.6*  --   --   --   --   --  3.0*   ALKPHOS 91  --  90  --   --   --   --   --  52*   AST 63*  --  61*  --   --  355*  --   --  346*   ALT 38  --  39  --   --  208*  --   --  173*   ANIONGAP 9  < > 11 11  --   --   --   --  14   EGFRNONAA 14.6*  < > 12.7* 12.4*  --   --   --   --  34.7*   < > = values in this interval not displayed.  Significant Imaging: I have reviewed all pertinent imaging results/findings within the past 24 hours.

## 2018-07-23 NOTE — ANESTHESIA PROCEDURE NOTES
Central Line    Diagnosis: olt  Doctor requesting consult: gulshan  Patient location during procedure: done in OR  Procedure start time: 7/23/2018 5:30 AM  Timeout: 7/23/2018 5:02 AM  Procedure end time: 7/23/2018 5:55 AM  Staffing  Anesthesiologist: RUDDY SIERRA  Performed: anesthesiologist   Anesthesiologist was present at the time of the procedure.  Preanesthetic Checklist  Completed: patient identified, site marked, surgical consent, pre-op evaluation, timeout performed, IV checked, risks and benefits discussed, monitors and equipment checked and anesthesia consent given  Indication  Indication: hemodynamic monitoring, vascular access, med administration, hemodialysis     Anesthesia   general anesthesia    Central Line  Skin Prep: skin prepped with Betadine, skin prep agent completely dried prior to procedure  maximum sterile barriers used during central venous catheter insertion  hand hygiene performed prior to central venous catheter insertion  Location: right internal jugular,   Catheter type: triple lumen  Catheter Size: 12 Fr  Ultrasound: vascular probe with ultrasound  Vessel Caliber: medium, patent  Vascular Doppler:  not done, compressibility normal  Needle advanced into vessel with real time Ultrasound guidance.  Guidewire confirmed in vessel.  Sterile sheath used.   Manometry: Venous cannualation confirmed by visual estimation of blood vessel pressure using manometry.  Insertion Attempts: 1   Securement:line sutured, chlorhexidine patch, sterile dressing applied and blood return through all ports     Post-Procedure  Adverse Events:none

## 2018-07-23 NOTE — PLAN OF CARE
Pre-operative Discussion Note  Liver Transplant Surgery    Alon Adamson is a 63 y.o. male admitted for liver transplant.  I discussed the planned procedure in detail, including expected hospital course and outcomes, benefits, risks, and potential complications.  Complications discussed included death, graft failure, bleeding, infection, rejection, and neurologic problems.  I discussed the risks of anesthesia, as well as the potential need for re-operation.  The possibility of other complications not specifically mentioned was also discussed.  Also, I discussed the need for lifelong immunosuppression and the possibility of serious complications from immunosuppressive drugs.    The discussion included the risks that the patient will incur if he elects to not have the proposed procedure.    Relevant donor-specific risk factors were disclosed and discussed with the patient, including:   none    Specific PHS Increased Risk Behavior criteria for the organ donor include:  None    HCV Infection Not applicable.    Hepatocellular Carcinoma Recurrence: Not applicable.    All questions were answered.  The patient and available family members voice understanding and agree to proceed with the transplant.    UNOS Patient Status  Note on scores:  ICU = 10 = total assistance  TSU = 20-30 = partial assistance  Outpatient admitted for transplant requiring medical care in last year = 40-50 = partial assistance  Scores 60 or higher indicate no assistance, meaning no need for medical care in last year. This would be very unusual for a transplant candidate.    Functional Status: 20% - Very sick, hospitalization necessary: active treatment necessary  Physical Capacity: Not Applicable (< 1 year old or hospitalized)

## 2018-07-23 NOTE — TRANSFER OF CARE
"Anesthesia Transfer of Care Note    Patient: Alon Adamson    Procedure(s) Performed: Procedure(s) (LRB):  TRANSPLANT, LIVER (N/A)    Patient location: ICU    Anesthesia Type: general    Transport from OR: Transported from OR intubated on 100% O2 by AMBU with adequate controlled ventilation. Upon arrival to PACU/ICU, patient attached to ventilator and auscultated to confirm bilateral breath sounds and adequate TV. Continuous ECG monitoring in transport. Continuous SpO2 monitoring in transport. Continuos invasive BP monitoring in transport    Post pain: adequate analgesia    Post assessment: no apparent anesthetic complications and tolerated procedure well    Post vital signs: stable    Level of consciousness: sedated    Nausea/Vomiting: no vomiting    Complications: none    Transfer of care protocol was followed      Last vitals:   Visit Vitals  BP (!) 125/50   Pulse 68   Temp 36.6 °C (97.9 °F) (Oral)   Resp 20   Ht 6' 2" (1.88 m)   Wt 126.3 kg (278 lb 7.1 oz)   SpO2 (!) 94%   BMI 35.75 kg/m²     "

## 2018-07-23 NOTE — PLAN OF CARE
Endocrinology signed off yesterday, but re-consulted today for hyperglycemia.    BG and insulin regimen reviewed.    BG goal 140-180.  Patient's BG at goal without insulin requirements.    History of ESLD, CAD, and CKD.  No DM history.  Patient receiving high dose steroids - can cause prandial excursions.  Patient is NPO, with exception of medications.  However, there are orders for Boost TID.  Will continue to follow BG       Recommend:  Low dose correction  POC q4h while NPO   Okay to change to AC/HS if diet advanced.      Pending BG levels may need scheduled prandial insulin.  Will follow and make adjustments as necessary.    Please call endocrinology if there are changes to diet.      Alesia Telles MD  Endocrinology Fellow

## 2018-07-23 NOTE — ASSESSMENT & PLAN NOTE
Contributing Nutrition Diagnosis  Malnutrition    Related to (etiology):   Chronic Illness/Injury    Signs and Symptoms (as evidenced by):  Energy Intake: <50% of estimated energy requirement for 1 month  Weight Loss: 9% x 1 month   Fluid Accumulation: moderate    Interventions/Recommendations (treatment strategy):  Full assessment completed, see RD Note 7/23/2018.    Nutrition Diagnosis Status:  New

## 2018-07-23 NOTE — SUBJECTIVE & OBJECTIVE
Interval History: Patient had liver transplant today. V/s are stable, still unconscious.Will start CRRT.    Review of patient's allergies indicates:   Allergen Reactions    Nsaids (non-steroidal anti-inflammatory drug)      D/t to liver disease.    Tylenol [acetaminophen]      D/t liver disease.    Penicillins Other (See Comments)     Tolerated pip-tazo in 8/2016 without issue  Since childhood was told not to take it     Current Facility-Administered Medications   Medication Frequency    0.9%  NaCl infusion (CRRT USE ONLY) Continuous    0.9%  NaCl infusion (for blood administration) Q24H PRN    0.9%  NaCl infusion (for blood administration) Q24H PRN    acetaminophen tablet 650 mg Q8H PRN    ascorbic acid (vitamin C) tablet 1,000 mg Daily    atorvastatin tablet 40 mg Daily    ceFEPIme injection 1 g Q12H    vancomycin (VANCOCIN) 1,500 mg in dextrose 5 % 250 mL IVPB Q12H    And    ciprofloxacin (CIPRO)400mg/200ml D5W IVPB 400 mg Q12H    dextrose 5 % and 0.45 % NaCl infusion Continuous    dextrose 50% injection 12.5 g PRN    dextrose 50% injection 25 g PRN    diphenhydrAMINE-zinc acetate 1-0.1% cream TID PRN    docusate sodium capsule 100 mg Daily    ergocalciferol capsule 50,000 Units Q7 Days    famotidine (PF) injection 20 mg Q12H    fluconazole tablet 200 mg Daily    heparin (porcine) injection 5,000 Units Q8H    HYDROmorphone injection 0.2 mg Q2H PRN    lactulose 10 gram/15 mL solution (enema) 200 g Q6H PRN    lactulose 20 gram/30 mL solution Soln 45 g TID    levETIRAcetam tablet 500 mg BID    lidocaine 5 % patch 2 patch Q24H    magnesium sulfate 2g in water 50mL IVPB (premix) PRN    [START ON 7/24/2018] methylPREDNISolone sodium succinate injection 100 mg Q12H    Followed by    [START ON 7/25/2018] methylPREDNISolone sodium succinate injection 80 mg Q12H    Followed by    [START ON 7/26/2018] methylPREDNISolone sodium succinate injection 60 mg Q12H    Followed by    [START ON  7/27/2018] methylPREDNISolone sodium succinate injection 40 mg Q12H    Followed by    [START ON 7/28/2018] methylPREDNISolone sodium succinate injection 20 mg Q12H    Followed by    [START ON 7/29/2018] predniSONE tablet 20 mg Daily    metoclopramide HCl injection 10 mg QID (AC + HS) PRN    mycophenolate mofetil 200 mg/mL suspension 1,000 mg BID    nystatin 100,000 unit/mL suspension 500,000 Units QID    octreotide injection 100 mcg Q8H    omega-3 acid ethyl esters capsule 2 g Daily with breakfast    ondansetron injection 4 mg Q8H PRN    PHENYLephrine (KATHE-SYNEPHRINE) 40 mg in sodium chloride 0.9% 250ml (titrating) (premix) Continuous    phytonadione vitamin k (AQUA-MEPHYTON) 10 mg in dextrose 5 % 50 mL IVPB Q8H    rifAXIMin tablet 550 mg BID    sertraline tablet 50 mg Daily    sodium bicarbonate tablet 1,950 mg TID    sodium chloride 0.9% flush 3 mL PRN    sodium phosphate 20.01 mmol in dextrose 5 % 250 mL IVPB PRN    sodium phosphate 30 mmol in dextrose 5 % 250 mL IVPB PRN    sodium phosphate 39.99 mmol in dextrose 5 % 250 mL IVPB PRN    sulfamethoxazole-trimethoprim 400-80mg per tablet 1 tablet Daily    tacrolimus (PROGRAF) 1 mg/mL oral syringe BID    triamcinolone acetonide 0.1% cream BID    valganciclovir 50 mg/ml oral solution 450 mg Daily    vasopressin (PITRESSIN) 0.2 Units/mL in dextrose 5 % 100 mL infusion Continuous    zinc sulfate capsule 220 mg BID       Objective:     Vital Signs (Most Recent):  Temp: 97.5 °F (36.4 °C) (07/23/18 1325)  Pulse: 66 (07/23/18 1325)  Resp: 14 (/ 100% 15PS/5 PEEP) (07/23/18 1325)  BP: (!) 130/43 (07/23/18 1325)  SpO2: 100 % (07/23/18 1325)  O2 Device (Oxygen Therapy): room air (07/23/18 0348) Vital Signs (24h Range):  Temp:  [97.5 °F (36.4 °C)-98.8 °F (37.1 °C)] 97.5 °F (36.4 °C)  Pulse:  [66-97] 66  Resp:  [14-20] 14  SpO2:  [94 %-100 %] 100 %  BP: ()/(29-56) 130/43     Weight: 126.3 kg (278 lb 7.1 oz) (07/20/18 0700)  Body mass index is  35.75 kg/m².  Body surface area is 2.57 meters squared.    I/O last 3 completed shifts:  In: 4456 [P.O.:2171; I.V.:1300; Blood:985]  Out: 760 [Urine:760]    Physical Exam   Constitutional: He appears well-developed and well-nourished. No distress.   Neck: Neck supple. No JVD present.   Cardiovascular: Normal rate, regular rhythm and normal heart sounds.    Pulmonary/Chest: Effort normal and breath sounds normal. No respiratory distress.   intubated   Abdominal:   Incision in upper quadrants, Liver transplant surgery    Neurological:   Unconscious, intubated   Vitals reviewed.

## 2018-07-23 NOTE — PROGRESS NOTES
Liver transplant surgery completed.  Bilateral lines flushed with n/s.   Dializer time 6:27.  .  Blood volume 66.6.

## 2018-07-23 NOTE — OP NOTE
Operative Report    Date of Procedure: 07/23/2018    Surgeon: Regulo Gómez MD  First Assistant: Yeimi Vargas    Pre-operative Diagnosis: Allograft liver for transplantation  Post-operative Diagnosis: Same    Procedure(s) Performed:   1. Back Table Preparation of Liver with needle biopsy  .    Anesthesia: Not applicable  Estimated Blood Loss: Not applicable  Fluids Administered: Not applicable    Findings: Estimated steatosis 0-10%, normal vascular anatomy.    The left lobe of the liver was atrophic.      Drains: Not applicable  Specimens: Core biopsy of allograft liver      Preamble  Indications: This report describes only the backbench preparation of the liver prior to transplantation.  The transplant operation itself is described in a separate report.    ABO Confirmation: Immediately following arrival of the donor organ and prior to implantation, a formal ABO confirmation was done according to hospital and UNOS policies.  I confirmed the UNOS ID number of the donor organ and the donor and recipient ABO types, directly verifying these data by comparison with the UNOS [facility] [place] report.  This confirmation was personally done by an attending surgeon and circulating nurse, and is officially documented elsewhere.    Time-Out: A complete time out was carried out prior to the procedure, with confirmation of patient identity, correct procedure, correct operative site, appropriate antibiotic prophylaxis, review of any known allergies, and presence of all needed equipment.    Procedure in Detail  The liver was recovered from the transport cooler and inspected for vascular anatomy and overall suitability for transplantation, with findings as noted above.  The remnant of the diaphragm was dissected away.  The phrenic veins and adrenal vein were identified and ligated or sutured as appropriate.  The portal vein and hepatic artery were mobilized toward the hilum of the liver, and extrahepatic branches were ligated.  No  vascular reconstruction was required.  The vessels were tested for leaks.  A needle biopsy was obtained for permanent section histology using a spring-loaded biopsy device. The liver was kept in ice temperature organ preservation solution until the time of implantation.

## 2018-07-23 NOTE — ANESTHESIA POSTPROCEDURE EVALUATION
"Anesthesia Post Evaluation    Patient: Alon Adamson    Procedure(s) Performed: Procedure(s) (LRB):  TRANSPLANT, LIVER (N/A)    Final Anesthesia Type: general  Patient location during evaluation: ICU  Patient participation: No - Unable to Participate, Intubation  Level of consciousness: awake and alert and oriented  Post-procedure vital signs: reviewed and stable  Pain management: adequate  Airway patency: patent  PONV status at discharge: No PONV  Anesthetic complications: no      Cardiovascular status: stable  Respiratory status: ETT and ventilator  Hydration status: euvolemic  Follow-up not needed.  Comments: Interview with family        Visit Vitals  BP (!) 112/56 (BP Location: Left arm, Patient Position: Lying)   Pulse 86   Temp 36.1 °C (97 °F) (Core ([place]))   Resp (!) 27   Ht 6' 2" (1.88 m)   Wt 126.3 kg (278 lb 7.1 oz)   SpO2 100%   BMI 35.75 kg/m²       Pain/Ray Score: Pain Assessment Performed: Yes (7/23/2018  3:00 PM)  Presence of Pain: non-verbal indicators absent (7/23/2018  3:00 PM)      "

## 2018-07-23 NOTE — PLAN OF CARE
Problem: Patient Care Overview  Goal: Plan of Care Review  Outcome: Ongoing (interventions implemented as appropriate)  * AAO x 4 delayed responses noted (at baseline).   * Abdomen distended and soft.  * Bowel sound noted as hyperactive. Patient is incontinent of stool. One bowel movements thus far brown stool noted  * 2 gm Na mechanical soft diet patient tolerating well. See chart for % eaten. Patient took medications very well tonight patient is NPO after 2000 for liver transplant in the AM, patient and family stated understanding.  * Generalized ecchymosis noted   * Edema noted to the bilateral lower extremities +4 pitting. Generalized edema noted. +2 edema noted to the scrotum.  * Bed at lowest position and locked, call light within reach, non-slip socks on, wife, mother, and son at bedside, and side rails up x 2.  Encouraged patient to call for assistance when needed patient and family stated understanding.   * Right upper arm midline (7/20/18) patent, dressing clean dry and intact no signs or symptoms of infection noted. Dressing due to be changed on 7/27/18, site to be changed on 8/17/18.  * Slight jundice noted the skin & sclera.  * Patient has no complaints of nausea PRN medication administered prior to evening medications to prevent nausea.  * Oxygen saturation 98 % on room air.  Respirations even and unlabored no signs or symptoms of distress noted.  * Patient has no complaints of pain at this time.  * Dermatitis/skin tear noted to the buttock barrier applied foam clean dry and intact.   * Standard precautions maintained.  * Turning every two hours see flow sheet for specific documentation.   * [place] catheter (7/13/18) patent and intact secured to leg with securment device. Cloudy lynne/brown urine noted see flow sheet for intake and output totals.  * BMP every 8 hours see chart for results no change from previous

## 2018-07-23 NOTE — ASSESSMENT & PLAN NOTE
63M PM alcoholic cirrhosis, initially admitted for decompensated cirrhosis, seen by ID earlier during hospital admission for cellulitis and CONS in 2/4 blood cx bottles, now s/p 7 days of vancomycin. He has been persistently hypothermic to 93-94* requiring warming blanket. ID re-consulted for evaluation of hypothermia. Pt is now s/p liver tx 7/23, donor w/ E. Coli bacteremia    Recommendations:  - donor w/ e. Coli bacteremia resistant to cipro  - change cefepime to rocephin  - cont vanco/cipro periop

## 2018-07-23 NOTE — PROGRESS NOTES
TRANSPLANT NOTE:    Admit Date: 2018    ORGAN:   LIVER  Disease Etiology: Alcoholic Cirrhosis  Donor CMV Status: Negative  Donor HCV Status: Negative  Donor HBcAb: Negative  Donor HBV TAMAR: Negative  Donor HCV TAMAR: Negative  Whole or Partial: Whole Liver  Biliary Anastomosis: End to End  Arterial Anatomy: Standard    Alon Adamson is a 63 y.o. male s/p     - Brain Death liver transplant on 2018 (Liver) for Alcoholic Cirrhosis.  This patient will follow the Steroid Induction protocol.  This patients immunosuppression will include a steroid taper over 6 weeks, Cellcept for 3 months and Prograf maintenance.  Opportunistic infection prophylaxis will include Acyclovir for 3 months (CMV D- R-), Bactrim for 6 months, and nystatin.  I have reviewed the pre-op medications and those have been restarted those, as appropriate.

## 2018-07-23 NOTE — ASSESSMENT & PLAN NOTE
62 yo male with acute decompensated liver failure secondary to alcohol cirrhosis now with sCr 1.7, showing improvement.  Nephrology consulted for VICKY.  Etiology most likely pre-renal.  Also considered HRS but less likely given optimal BP and renal function labs.     -sCr at baseline 1.4-1.5, sCr stable at 1.7 this morning, UOP increasing and about 30ccs per hour this morning  - continue to monitor serial RFPs and strict Is & Os  - Avoid nephrotoxic medications  - Maintain MAP >65  - Hb > 7gm/dL  - ABGs, U/A, Labs in AM   - CRRT

## 2018-07-23 NOTE — PROGRESS NOTES
Cont  crrt started w/o diff via right a/v cath with lines taped and secured.  Machine set for nephrology orders.   Safety lure lock to venous chamber per protocol.  Air detector engaged.  Supplies stocked report given.

## 2018-07-23 NOTE — NURSING
Family back at bedside. Bed at lowest position and locked, call light within reach, non-slip socks on, and side rails up x 2.  Encouraged patient to call for assistance when needed family and patient stated understanding.

## 2018-07-23 NOTE — PROGRESS NOTES
[facility]  Infectious Disease  Progress Note    Patient Name: Alon Adamson  MRN: [id number]  Admission Date: 6/22/2018  Length of Stay: 31 days  Attending Physician: Williams Moon Jr., MD  Primary Care Provider: Mckinley Vides MD    Isolation Status: No active isolations  Assessment/Plan:      At risk for infection transmitted from donor    63M PMHC alcoholic cirrhosis, initially admitted for decompensated cirrhosis, seen by ID earlier during hospital admission for cellulitis and CONS in 2/4 blood cx bottles, now s/p 7 days of vancomycin. He has been persistently hypothermic to 93-94* requiring warming blanket. ID re-consulted for evaluation of hypothermia. Pt is now s/p liver tx 7/23, donor w/ E. Coli bacteremia    Recommendations:  - donor w/ e. Coli bacteremia resistant to cipro  - change cefepime to rocephin  - cont vanco/cipro periop            Anticipated Disposition: TBD    Thank you for your consult. I will follow-up with patient. Please contact us if you have any additional questions.    Jackelyn Hampton DO  Infectious Disease Fellow  P: [phone]      Subjective:     Principal Problem:Acute kidney injury superimposed on chronic kidney disease    HPI: 64 y/o M h/o ETOH cirrhosis (PSE, pHTN) listed for liver txp, CKD3, CAD recently admitted for worsening PSE  requiring intubation admitted from clinic with hypervolemia, VICKY and b/l LE erythema. Here he has been afebrile, with diagnostic paracentesis unremarkable, found to have CONS 2/4 blood culture bottles with repeat negative.  He has been on empiric vancomycin and ceftriaxone and b/l LE erythema has improved and overall (combined with other interventions) his mental status has improved.  He remains deconditioned.    7/19: reconsulted for hypothermia. Pt was treated for 7 days on vancomycin for treatment of cellulitis, blood culture that was + for CONs felt to be contaminate. He has been persistently hypothermic, requiring warming  blanket for the past day. Pt states that he otherwise feels well. He was started on vanc and cefepime for empiric treatment.  Interval History: no events overnight, pt taken for liver transplant this morning. Pt sedated on vent, discussed w/ family at bedside.    Review of Systems   Unable to perform ROS: Intubated   All other systems reviewed and are negative.    Objective:     Vital Signs (Most Recent):  Temp: (!) 95.7 °F (35.4 °C) (07/23/18 1600)  Pulse: 83 (07/23/18 1630)  Resp: (!) 30 (07/23/18 1630)  BP: (!) 107/53 (07/23/18 1600)  SpO2: 96 % (07/23/18 1630) Vital Signs (24h Range):  Temp:  [95.7 °F (35.4 °C)-98.1 °F (36.7 °C)] 95.7 °F (35.4 °C)  Pulse:  [65-97] 83  Resp:  [13-30] 30  SpO2:  [94 %-100 %] 96 %  BP: ()/(29-57) 107/53  Arterial Line BP: (101-163)/(41-54) 106/45     Weight: 126.3 kg (278 lb 7.1 oz)  Body mass index is 35.75 kg/m².    Estimated Creatinine Clearance: 54.1 mL/min (A) (based on SCr of 2 mg/dL (H)).    Physical Exam   Constitutional: He appears well-developed and well-nourished. No distress.   HENT:   Head: Atraumatic.   Right Ear: External ear normal.   Left Ear: External ear normal.   Nose: Nose normal.   Mouth/Throat: Oropharynx is clear and moist.   Eyes: EOM are normal.   Neck: Normal range of motion. Neck supple.   Cardiovascular: Normal rate, regular rhythm and normal heart sounds.    No murmur heard.  Pulmonary/Chest: Effort normal and breath sounds normal. No respiratory distress. He has no wheezes.   Abdominal: Soft. Bowel sounds are normal. He exhibits no distension. There is no tenderness.   Musculoskeletal: Normal range of motion. He exhibits no edema or deformity.   Neurological: No cranial nerve deficit.   Skin: Skin is dry. No rash noted. He is not diaphoretic.   Chronic skin changes b/l LE, no drainage   Psychiatric: He has a normal mood and affect. His behavior is normal.       Significant Labs:   Bilirubin:     Recent Labs  Lab 07/20/18  0639 07/21/18  0400  07/22/18  0505 07/22/18  1729 07/23/18  1328   BILIDIR  --   --   --  0.9*  --    BILITOT 1.8* 1.6* 2.0* 1.6* 3.0*     Blood Culture:     Recent Labs  Lab 07/09/18  1218 07/12/18  0943 07/13/18  0822 07/17/18  1130 07/21/18  0910   LABBLOO No growth after 5 days. No growth after 5 days.  No growth after 5 days. No growth after 5 days.  No growth after 5 days. No growth after 5 days. No Growth to date  No Growth to date  No Growth to date  No Growth to date  No Growth to date  No Growth to date     CBC:     Recent Labs  Lab 07/22/18  1729  07/23/18  1210 07/23/18  1332 07/23/18  1333 07/23/18  1601   WBC 3.26*  --   --   --  10.10 11.49   HGB 7.8*  < > 9.3*  --  9.7* 10.4*   HCT 25.1*  < > 28.0* 25* 29.5* 32.7*   PLT 29*  < > 53*  --  38* 38*   < > = values in this interval not displayed.  CMP:     Recent Labs  Lab 07/22/18  0505  07/22/18  1729 07/22/18  2140  07/23/18  0945 07/23/18  1100 07/23/18  1210 07/23/18  1328   *  < > 149* 149*  < > 148* 145 143 143   K 4.6  < > 4.7 4.6  < > 3.4* 4.0 4.0 4.2   *  < > 121* 121*  --   --   --   --  111*   CO2 18*  < > 17* 17*  --   --   --   --  18*   *  < > 183* 158*  < > 155* 171* 161* 185*   BUN 74*  < > 76* 79*  --   --   --   --  28*   CREATININE 4.1*  < > 4.6* 4.7*  --   --   --   --  2.0*   CALCIUM 9.4  < > 9.5 9.7  --   --   --   --  8.1*   PROT 5.2*  --  5.2*  --   --   --   --   --  3.0*   ALBUMIN 2.6*  --  2.6*  --   < > 1.4* 1.5* 1.8* 1.9*   BILITOT 2.0*  --  1.6*  --   --   --   --   --  3.0*   ALKPHOS 91  --  90  --   --   --   --   --  52*   AST 63*  --  61*  --   --  355*  --   --  346*   ALT 38  --  39  --   --  208*  --   --  173*   ANIONGAP 9  < > 11 11  --   --   --   --  14   EGFRNONAA 14.6*  < > 12.7* 12.4*  --   --   --   --  34.7*   < > = values in this interval not displayed.  Significant Imaging: I have reviewed all pertinent imaging results/findings within the past 24 hours.

## 2018-07-23 NOTE — CONSULTS
"  [facility]  Adult Nutrition  Consult Note    SUMMARY     Recommendations  Recommendation/Intervention:   1. When able to extubate, ADAT to Diabetic with texture per SLP.   2. If unable to extubate, RD to provide TF recommendations.   3. TSU RD to provide post-transplant diet education when appropriate.   RD to monitor.    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 Currently intubated, sedated.   Nutrition Discharge Planning: TSU RD to provide post-transplant diet education when appropriate.    Nutrition Risk Screen  Nutrition Risk Screen: no indicators present    Nutrition/Diet History  Patient Reported Diet/Restrictions/Preferences: low salt  Typical Food/Fluid Intake: Patient with decreased PO itnake prior to surgery.  Food Preferences: No coffee, no tea, likes Jell-O  Do you have any cultural, spiritual, Moravian conflicts, given your current situation?: none  Food Allergies: NKFA  Factors Affecting Nutritional Intake: NPO, on mechanical ventilation    Anthropometrics  Temp: 96.8 °F (36 °C)  Height: 6' 2" (188 cm)  Height (inches): 74 in  Weight Method: Bed Scale  Weight: 126.3 kg (278 lb 7.1 oz)  Weight (lb): 278.44 lb  Ideal Body Weight (IBW), Male: 190 lb  % Ideal Body Weight, Male (lb): 146.78 lb  BMI (Calculated): 35.9  BMI Grade: 35 - 39.9 - obesity - grade II  Usual Body Weight (UBW), kg: (!) 139 kg (admit weight - 6/23)  % Usual Body Weight: 91.05  % Weight Change From Usual Weight: -9.14 %    Lab/Procedures/Meds  Pertinent Labs Reviewed: reviewed  Pertinent Labs Comments: Glu 161, POCT Glu 180, HgbA1c 5.2, Alb " 1.8  Pertinent Medications Reviewed: reviewed  Pertinent Medications Comments: vitamin C, docusate, famotidine, methylprednisolone, prograf, IVF, vasopressin    Physical Findings/Assessment  Overall Physical Appearance: on ventilator support, overweight  Tubes: orogastric tube  Oral/Mouth Cavity: tooth/teeth missing  Skin: edema, incision(s)    Estimated/Assessed Needs  Weight Used For Calorie Calculations: 126.3 kg (278 lb 7.1 oz)  Energy Calorie Requirements (kcal): 2121 kcal/day  Energy Need Method: Demario State (modified)  Protein Requirements: 130-173 g/day (1.5-2.0 g/kg)  Weight Used For Protein Calculations: 86.4 kg (190 lb 7.6 oz) (IBW)  Fluid Requirements (mL): 1 mL/kcal or per MD  Fluid Need Method: RDA Method  RDA Method (mL): 2121    Nutrition Prescription Ordered  Current Diet Order: NPO    Evaluation of Received Nutrient/Fluid Intake  I/O: +2.9L x 24hrs, +18.4L since admit  Comments: LBM 7/21  % Intake of Estimated Energy Needs: 0 - 25 %  % Meal Intake: NPO    Nutrition Risk  Level of Risk/Frequency of Follow-up: high (2x/week)     Assessment and Plan  Severe protein-calorie malnutrition    Contributing Nutrition Diagnosis  Malnutrition    Related to (etiology):   Chronic Illness/Injury    Signs and Symptoms (as evidenced by):  Energy Intake: <50% of estimated energy requirement for 1 month  Weight Loss: 9% x 1 month   Fluid Accumulation: moderate    Interventions/Recommendations (treatment strategy):  Full assessment completed, see RD Note 7/23/2018.    Nutrition Diagnosis Status:  New        Monitor and Evaluation  Food and Nutrient Intake: energy intake  Food and Nutrient Adminstration: diet order  Knowledge/Beliefs/Attitudes: food and nutrition knowledge/skill  Physical Activity and Function: nutrition-related ADLs and IADLs  Anthropometric Measurements: weight, weight change  Biochemical Data, Medical Tests and Procedures: electrolyte and renal panel, gastrointestinal profile, glucose/endocrine  profile, inflammatory profile  Nutrition-Focused Physical Findings: overall appearance, skin     Nutrition Follow-Up  RD Follow-up?: Yes

## 2018-07-23 NOTE — PT/OT/SLP DISCHARGE
Occupational Therapy Discharge Summary    Alon Adamson  MRN: [id number]   Principal Problem: Acute kidney injury superimposed on chronic kidney disease      Patient Discharged from acute Occupational Therapy on 7/23.  Please refer to prior OT note dated 7/21/18 for functional status.    Assessment:      Pt s/p liver transplant; transferred to SICU.    Objective:     GOALS:    Occupational Therapy Goals        Problem: Occupational Therapy Goal    Goal Priority Disciplines Outcome Interventions   Occupational Therapy Goal     OT, PT/OT Ongoing (interventions implemented as appropriate)    Description:  Goals to be met by: 7/30/18    Patient will increase functional independence with ADLs by performing:     Upper body dressing while seated EOB with supervision ( including snap/tie completion)  Grooming while standing at sink with Stand-by Assistance  Toileting from toilet with Stand-by Assistance for hygiene and clothing management.  Toilet transfer to toilet with Stand-by Assistance.  Pt will complete a functional dynamic standing activity ~8 minutes without a rest break.   Pt will complete therapy session with minimal cues provided for re-direction. Met 7/21 (continue)  Pt oriented to person, place, time, and situation with independence.                            Reasons for Discontinuation of Therapy Services  Pt transferred to SICU following liver tranplant. OT orders discontinued, please re-consult when medically appropritate.       Plan:     SICU. Please re-consult therapy services when medically appropriate.     Aida buckley, OT  7/23/2018

## 2018-07-23 NOTE — PLAN OF CARE
Problem: Patient Care Overview  Goal: Plan of Care Review  Outcome: Ongoing (interventions implemented as appropriate)  Pt s/p liver transplant. Pt remains intubated on CPAP. Vasopressin infusing at 0.04 units/min to maintain a -160. Insulin infusing at 1.8 Units/hr, accuchecks monitored hourly. Labs monitored q4. Minimal U/O 5cc/hr, Nephrology consulted, orders for CRRT received. CHAS drains emptied and monitored q1hr. 500 cc 5 % Albumin given for low CVP. Plan is to wean pressor, wean vent to extubate, and liver U/S to be done tomorrow morning. Plan of care reviewed with patient and family. All questions answered per SICU RN.

## 2018-07-23 NOTE — PROGRESS NOTES
Report received from Gus, Anesthesia. Aware of patient current condition, all gtt rates, VS, fluids/blood products administered, output, and recent labs. Pt transported from OR to SICU [id number]. Upon arrival SICU RN at bedside, pt connected to SICU monitor and ventilator. Dr. Huff presented to bedside. Labs and ABG drawn. CHAS drains emptied. See flowsheet for all orders received and implemented. Will continue to monitor closely.

## 2018-07-23 NOTE — OP NOTE
Operative Report    Date of Procedure: 07/23/2018    Surgeons:  Surgeon(s) and Role:     * Alma Joyce MD - Primary     * Dwayne Mccarthy MD - Fellow     * Regulo Gómez MD - Assisting    First Assistant Attestation:  The presence of an additional attending surgeon functioning as first assistant was required due to the complexity of the procedure relative to any available residents. I certify that no resident was available who was qualified to serve as first assistant. Duties performed by the assistant included assisting the primary surgeon.    Pre-operative Diagnosis (UNOS): End Stage Liver Disease due to Alcoholic Cirrhosis,  and Chronic hepatic failure without coma K72.10    Post-operative Diagnosis: Same    Principal Procedure Perfomed: Orthotopic Liver Transplant  (whole liver, DBD donor, biliary reconstruction end to end donor common bile duct to recipient common bile duct  )  Additional Procedures Perfomed:   None    Anesthesia: General endotracheal  Findings: cirrhosis, portal hypertension, ascites and adhesions    Preamble  Indications and Patient Counseling: The patient is a 63 y.o. year-old male with Alcoholic Cirrhosis,  (UNOS terminology) who has been evaluated for a liver transplant.  The procedure was thoroughly discussed with the patient, including potential risks, complications, and alternatives. Specific complications mentioned included death, graft non-function, bleeding, infection, rejection, renal failure, respiratory failure, and neurologic deficits, as well as the possibility of other complications not specifically mentioned.    Donor Risk Factors:  Prior to the operation, the patient was advised of any donor-specific risk factors requiring specific disclosure. Factors in this case included nothing that required specific disclosure.      Specific PHS Increased Risk Behavior criteria for the organ donor include:  None    All questions were answered, the patient voiced appropriate  understanding, and he agreed to proceed with the planned procedure.    ABO Confirmation: Immediately following arrival of the donor organ and prior to implantation, a formal ABO confirmation was done according to hospital and UNOS policies.  I confirmed the UNOS ID number of the donor organ ([id number]) and the donor and recipient ABO types, directly verifying these data by comparison with the UNOS Match Run report ([id number].  This confirmation was personally done by an attending surgeon and circulating nurse, and is officially documented elsewhere.    Time-Out: A complete time out was carried out prior to incision, with confirmation of patient identity, correct procedure, correct operative site, appropriate antibiotic prophylaxis, review of any known allergies, and presence of all needed equipment.    Procedure in Detail  Following the induction of general endotracheal anesthesia, appropriate arterial and venous lines were placed by the anesthesia team.  Care was taken to pad all pressure points and avoid any potential traction injuries from positioning. The urinary bladder was catheterized.  Sequential compression boots and Ben Huggers were used. The chest, abdomen, and upper thighs were sterilely prepped and draped.     The abdomen was entered via a wide bilateral subcostal incision. 4500 mL of ascites was removed. The round ligament was ligated and divided. The falciform, left triangular, and gastrohepatic ligaments were divided using the electocautery, with 2-0 silk ties as needed. The Monroe self-retaining retractor was placed to provide exposure. Adhesions between the abdominal viscera and the abdominal wall and the undersurface of the liver were divided as needed using cautery dissection and silk ties or suture ligatures. Hilar dissection was then carried out, with ligation of the right and left hepatic arteries as well as the cystic duct and the common hepatic duct. The recipient arterial anatomy was found  to be: standard. The portal vein was exposed circumferentially from its bifurcation down to the superior border of the pancreas. The portal vein was found to be partial patent.  Attention was next directed to the right side of the liver where the right triangular ligament was taken down, exposing the bare area of the liver, and the IVC. The infrahepatic IVC was isolated, followed by mobilization of the retrohepatic cava, division of the right adrenal vein, and isolation of the suprahepatic IVC. The patient was given 4500 units of heparin prior to caval cross-clamping. portal vein thrombosis was managed with thromboendvenectomy. After assuring adequate stability after cross-clamping, the native liver was excised and the allograft liver was brought to the operative field.  The liver was perfused with cold 5% albumin during implantation to displace the organ preservation solution.  IVC reconstruction technique consisted of end to end ivc using 3-0 and 4-0 polypropylene as appropriate.  There was significant recipient-donor size mismatch at the suprahepatic IVC anastomosis so a cavoplasty was performed, closing the lateral 1.5 cm of the recipient caval opening.  The donor portal vein was then anastomosed to the recipient portal inflow site (end portal vein) with 5-0 polypropylene. Once this was completed, anesthesia was notified and the liver was re-perfused. Copious irrigation with warm saline and temporary finger occlusion of portal inflow were used as needed to avoid any problems with hypothermia. Temporary packing, electocautery, and polypropylene suture ligatures were used as appropriate to provide hemostasis. Once this was satisfactory, attention was directed to the arterial reconstruction. This liver did not come from a DCD donor. Arterial inflow was provided to the graft by anastomosing the recipient proper hepatic artery to the donor  celiac trunk using 6-0 polypropylene. The liver was assessed for adequacy of  perfusion, which was satisfactory. The gallbladder was then removed from the allograft liver, and a temporary packing period was carried out to help assure hemostatis.   Attention was next directed toward the bile duct. The donor bile duct was prepared and assessed for adequate vascular supply. Biliary reconstruction was then performed by anastomosing the recipient common bile duct to the donor duct using 6-0 PDS sutures.  The biliary stent used was: none.  The donor liver had an atrophied left lobe and was smaller than the cavity so to prevent torsion, the donor and recipient falciform ligaments were tacked with interrupted 3-0 silk sutre as an additional point of fixation to the abdominal wall.  A final check for hemostasis was made. 2 19f Pepe drains were placed through separate incisions below the transverse abdominal incision and placed in the usual positions. The cavity was irrigated with saline. The abdomen was closed in layers using running #1 PDS suture. The incision was irrigated and the skin was closed with staples. At the end of the case all instrument, needle, and sponge counts were correct. The patient was taken to the ICU in good condition.     Fluids Administered:   Crystalloid (mL) 29457   Colloid (mL) 150   RBC (Units) 9   FFP (Units) 2   Cryoprecipitate (Units)  2   Platelets (Units) 2   Cell Saver (CCs) 4403      Specimens: Explant liver  Drains: 19f Pepe drains x 2    Additional Findings:    Estimated Blood Loss: 37588 mL  Ascites Evacuated: 4500 mL    Ischemic Times:  Time of portal reperfusion: 7/23/2018  9:14 AM  Time of arterial reperfusion: 7/23/2018  9:41 AM  Anastomosis (warm ischemia) time: 43 minutes  Cold ischemia time: 263 minutes    Surgical Data:  Graft type (whole vs. partial): whole liver  IVC reconstruction: end to end ivc  Portal vein status: partial patent  Portal graft performed? no  Donor arterial anatomy: standard  Donor arterial inflow: celiac trunk  Recipient arterial  anatomy: standard  Recipient arterial inflow: proper hepatic artery  Arterial graft performed? no  Biliary reconstruction: end to end (donor common bile duct to recipient common bile duct)  Biliary stent: none  Disposition of Vessels from donor of transplanted organ: sent to blood bank  Damage during procurement: no  Procurement damage comments: atrophied left hepatic lobe    Donor Factors:  UNOS ID: )[id number]  UNOS Match Run: [id number]  Donor type:  - brain death  Donor with reported PHS increased risk behavior: no  Donor CMV serologic status: Negative  Donor with known cancer: no  Donor HCV serologic status: Negative  Donor HBcAb: Negative  Donor HBV TAMAR: Negative  Donor HCV TAMAR: Negative  Liver weight: 1256 grams

## 2018-07-24 PROBLEM — T38.0X5A STEROID-INDUCED HYPERGLYCEMIA: Status: ACTIVE | Noted: 2018-07-24

## 2018-07-24 PROBLEM — R78.81 POSITIVE BLOOD CULTURE IN CADAVERIC DONOR: Status: ACTIVE | Noted: 2018-07-24

## 2018-07-24 PROBLEM — Z94.4 LIVER TRANSPLANTED: Status: ACTIVE | Noted: 2018-07-24

## 2018-07-24 PROBLEM — Z00.5 POSITIVE BLOOD CULTURE IN CADAVERIC DONOR: Status: ACTIVE | Noted: 2018-07-24

## 2018-07-24 PROBLEM — R73.9 STEROID-INDUCED HYPERGLYCEMIA: Status: ACTIVE | Noted: 2018-07-24

## 2018-07-24 LAB
25(OH)D3+25(OH)D2 SERPL-MCNC: 7 NG/ML
ALBUMIN SERPL BCP-MCNC: 2.1 G/DL
ALBUMIN SERPL BCP-MCNC: 2.7 G/DL
ALBUMIN SERPL BCP-MCNC: 2.7 G/DL
ALLENS TEST: ABNORMAL
ALP SERPL-CCNC: 55 U/L
ALT SERPL W/O P-5'-P-CCNC: 152 U/L
ANION GAP SERPL CALC-SCNC: 8 MMOL/L
ANION GAP SERPL CALC-SCNC: 9 MMOL/L
ANISOCYTOSIS BLD QL SMEAR: ABNORMAL
ANISOCYTOSIS BLD QL SMEAR: ABNORMAL
ANISOCYTOSIS BLD QL SMEAR: SLIGHT
APTT BLDCRRT: 36.1 SEC
AST SERPL-CCNC: 121 U/L
AST SERPL-CCNC: 139 U/L
AST SERPL-CCNC: 169 U/L
AST SERPL-CCNC: 220 U/L
AST SERPL-CCNC: 220 U/L
AST SERPL-CCNC: 98 U/L
BASO STIPL BLD QL SMEAR: ABNORMAL
BASO STIPL BLD QL SMEAR: ABNORMAL
BASOPHILS # BLD AUTO: 0.01 K/UL
BASOPHILS # BLD AUTO: 0.02 K/UL
BASOPHILS # BLD AUTO: 0.02 K/UL
BASOPHILS # BLD AUTO: 0.03 K/UL
BASOPHILS NFR BLD: 0.2 %
BASOPHILS NFR BLD: 0.3 %
BASOPHILS NFR BLD: 0.4 %
BILIRUB DIRECT SERPL-MCNC: 1.8 MG/DL
BILIRUB SERPL-MCNC: 2.9 MG/DL
BLD PROD TYP BPU: NORMAL
BLOOD UNIT EXPIRATION DATE: NORMAL
BLOOD UNIT TYPE CODE: 600
BLOOD UNIT TYPE CODE: 600
BLOOD UNIT TYPE CODE: 6200
BLOOD UNIT TYPE: NORMAL
BUN SERPL-MCNC: 34 MG/DL
BUN SERPL-MCNC: 34 MG/DL
BUN SERPL-MCNC: 36 MG/DL
BUN SERPL-MCNC: 45 MG/DL
BUN SERPL-MCNC: 51 MG/DL
BUN SERPL-MCNC: 51 MG/DL
BURR CELLS BLD QL SMEAR: ABNORMAL
CALCIUM SERPL-MCNC: 7.1 MG/DL
CALCIUM SERPL-MCNC: 7.1 MG/DL
CALCIUM SERPL-MCNC: 7.4 MG/DL
CALCIUM SERPL-MCNC: 7.7 MG/DL
CALCIUM SERPL-MCNC: 8 MG/DL
CALCIUM SERPL-MCNC: 8 MG/DL
CHLORIDE SERPL-SCNC: 107 MMOL/L
CHLORIDE SERPL-SCNC: 109 MMOL/L
CHLORIDE SERPL-SCNC: 110 MMOL/L
CHLORIDE SERPL-SCNC: 110 MMOL/L
CO2 SERPL-SCNC: 21 MMOL/L
CO2 SERPL-SCNC: 23 MMOL/L
CODING SYSTEM: NORMAL
CREAT SERPL-MCNC: 2.4 MG/DL
CREAT SERPL-MCNC: 2.4 MG/DL
CREAT SERPL-MCNC: 2.7 MG/DL
CREAT SERPL-MCNC: 3 MG/DL
CREAT SERPL-MCNC: 3.2 MG/DL
CREAT SERPL-MCNC: 3.2 MG/DL
DELSYS: ABNORMAL
DIFFERENTIAL METHOD: ABNORMAL
DISPENSE STATUS: NORMAL
EOSINOPHIL # BLD AUTO: 0 K/UL
EOSINOPHIL NFR BLD: 0 %
EOSINOPHIL NFR BLD: 0.2 %
ERYTHROCYTE [DISTWIDTH] IN BLOOD BY AUTOMATED COUNT: 18.9 %
ERYTHROCYTE [DISTWIDTH] IN BLOOD BY AUTOMATED COUNT: 19.1 %
ERYTHROCYTE [DISTWIDTH] IN BLOOD BY AUTOMATED COUNT: 19.3 %
ERYTHROCYTE [DISTWIDTH] IN BLOOD BY AUTOMATED COUNT: 19.3 %
ERYTHROCYTE [DISTWIDTH] IN BLOOD BY AUTOMATED COUNT: 19.4 %
ERYTHROCYTE [DISTWIDTH] IN BLOOD BY AUTOMATED COUNT: 19.4 %
ERYTHROCYTE [SEDIMENTATION RATE] IN BLOOD BY WESTERGREN METHOD: 16 MM/H
EST. GFR  (AFRICAN AMERICAN): 22.8 ML/MIN/1.73 M^2
EST. GFR  (AFRICAN AMERICAN): 22.8 ML/MIN/1.73 M^2
EST. GFR  (AFRICAN AMERICAN): 24.6 ML/MIN/1.73 M^2
EST. GFR  (AFRICAN AMERICAN): 27.9 ML/MIN/1.73 M^2
EST. GFR  (AFRICAN AMERICAN): 32.2 ML/MIN/1.73 M^2
EST. GFR  (AFRICAN AMERICAN): 32.2 ML/MIN/1.73 M^2
EST. GFR  (NON AFRICAN AMERICAN): 19.7 ML/MIN/1.73 M^2
EST. GFR  (NON AFRICAN AMERICAN): 19.7 ML/MIN/1.73 M^2
EST. GFR  (NON AFRICAN AMERICAN): 21.3 ML/MIN/1.73 M^2
EST. GFR  (NON AFRICAN AMERICAN): 24.2 ML/MIN/1.73 M^2
EST. GFR  (NON AFRICAN AMERICAN): 27.9 ML/MIN/1.73 M^2
EST. GFR  (NON AFRICAN AMERICAN): 27.9 ML/MIN/1.73 M^2
FIO2: 40
GGT SERPL-CCNC: 30 U/L
GIANT PLATELETS BLD QL SMEAR: PRESENT
GLUCOSE SERPL-MCNC: 165 MG/DL (ref 70–110)
GLUCOSE SERPL-MCNC: 207 MG/DL
GLUCOSE SERPL-MCNC: 212 MG/DL
GLUCOSE SERPL-MCNC: 212 MG/DL
GLUCOSE SERPL-MCNC: 238 MG/DL
GLUCOSE SERPL-MCNC: 238 MG/DL
GLUCOSE SERPL-MCNC: 99 MG/DL
HCO3 UR-SCNC: 20.7 MMOL/L (ref 24–28)
HCO3 UR-SCNC: 22.3 MMOL/L (ref 24–28)
HCO3 UR-SCNC: 22.7 MMOL/L (ref 24–28)
HCO3 UR-SCNC: 22.8 MMOL/L (ref 24–28)
HCO3 UR-SCNC: 23.2 MMOL/L (ref 24–28)
HCO3 UR-SCNC: 25.3 MMOL/L (ref 24–28)
HCT VFR BLD AUTO: 23.9 %
HCT VFR BLD AUTO: 25.6 %
HCT VFR BLD AUTO: 26.3 %
HCT VFR BLD AUTO: 28.5 %
HCT VFR BLD AUTO: 29.1 %
HCT VFR BLD AUTO: 30.6 %
HCT VFR BLD CALC: 30 %PCV (ref 36–54)
HGB BLD-MCNC: 10.1 G/DL
HGB BLD-MCNC: 8 G/DL
HGB BLD-MCNC: 8.4 G/DL
HGB BLD-MCNC: 8.6 G/DL
HGB BLD-MCNC: 9.4 G/DL
HGB BLD-MCNC: 9.7 G/DL
HYPOCHROMIA BLD QL SMEAR: ABNORMAL
HYPOCHROMIA BLD QL SMEAR: ABNORMAL
IMM GRANULOCYTES # BLD AUTO: 0.02 K/UL
IMM GRANULOCYTES # BLD AUTO: 0.02 K/UL
IMM GRANULOCYTES # BLD AUTO: 0.03 K/UL
IMM GRANULOCYTES # BLD AUTO: 0.04 K/UL
IMM GRANULOCYTES # BLD AUTO: 0.04 K/UL
IMM GRANULOCYTES # BLD AUTO: 0.06 K/UL
IMM GRANULOCYTES NFR BLD AUTO: 0.3 %
IMM GRANULOCYTES NFR BLD AUTO: 0.4 %
IMM GRANULOCYTES NFR BLD AUTO: 0.6 %
IMM GRANULOCYTES NFR BLD AUTO: 0.7 %
INR PPP: 1.3
INR PPP: 1.3
INR PPP: 1.4
LDH SERPL L TO P-CCNC: 0.6 MMOL/L (ref 0.36–1.25)
LDH SERPL L TO P-CCNC: 0.69 MMOL/L (ref 0.36–1.25)
LDH SERPL L TO P-CCNC: 0.94 MMOL/L (ref 0.36–1.25)
LDH SERPL L TO P-CCNC: 1.21 MMOL/L (ref 0.36–1.25)
LYMPHOCYTES # BLD AUTO: 0.3 K/UL
LYMPHOCYTES # BLD AUTO: 0.3 K/UL
LYMPHOCYTES # BLD AUTO: 0.4 K/UL
LYMPHOCYTES # BLD AUTO: 0.5 K/UL
LYMPHOCYTES NFR BLD: 5.6 %
LYMPHOCYTES NFR BLD: 6 %
LYMPHOCYTES NFR BLD: 6 %
LYMPHOCYTES NFR BLD: 6.7 %
LYMPHOCYTES NFR BLD: 7.1 %
LYMPHOCYTES NFR BLD: 7.7 %
MAGNESIUM SERPL-MCNC: 2.2 MG/DL
MAGNESIUM SERPL-MCNC: 2.4 MG/DL
MAGNESIUM SERPL-MCNC: 2.4 MG/DL
MCH RBC QN AUTO: 31.9 PG
MCH RBC QN AUTO: 32 PG
MCH RBC QN AUTO: 32.1 PG
MCH RBC QN AUTO: 32.1 PG
MCH RBC QN AUTO: 32.7 PG
MCH RBC QN AUTO: 32.7 PG
MCHC RBC AUTO-ENTMCNC: 32.7 G/DL
MCHC RBC AUTO-ENTMCNC: 32.8 G/DL
MCHC RBC AUTO-ENTMCNC: 33 G/DL
MCHC RBC AUTO-ENTMCNC: 33 G/DL
MCHC RBC AUTO-ENTMCNC: 33.3 G/DL
MCHC RBC AUTO-ENTMCNC: 33.5 G/DL
MCV RBC AUTO: 97 FL
MCV RBC AUTO: 97 FL
MCV RBC AUTO: 98 FL
MODE: ABNORMAL
MONOCYTES # BLD AUTO: 0.5 K/UL
MONOCYTES # BLD AUTO: 0.6 K/UL
MONOCYTES # BLD AUTO: 0.8 K/UL
MONOCYTES # BLD AUTO: 0.8 K/UL
MONOCYTES NFR BLD: 10 %
MONOCYTES NFR BLD: 10.4 %
MONOCYTES NFR BLD: 11.4 %
MONOCYTES NFR BLD: 7.7 %
MONOCYTES NFR BLD: 9.3 %
MONOCYTES NFR BLD: 9.8 %
NEUTROPHILS # BLD AUTO: 4.1 K/UL
NEUTROPHILS # BLD AUTO: 4.4 K/UL
NEUTROPHILS # BLD AUTO: 4.8 K/UL
NEUTROPHILS # BLD AUTO: 5.3 K/UL
NEUTROPHILS # BLD AUTO: 5.7 K/UL
NEUTROPHILS # BLD AUTO: 7 K/UL
NEUTROPHILS NFR BLD: 79.8 %
NEUTROPHILS NFR BLD: 82.8 %
NEUTROPHILS NFR BLD: 83 %
NEUTROPHILS NFR BLD: 83.1 %
NEUTROPHILS NFR BLD: 84.2 %
NEUTROPHILS NFR BLD: 84.5 %
NRBC BLD-RTO: 0 /100 WBC
NUM UNITS TRANS FFP: NORMAL
PCO2 BLDA: 33.6 MMHG (ref 35–45)
PCO2 BLDA: 39.8 MMHG (ref 35–45)
PCO2 BLDA: 40.8 MMHG (ref 35–45)
PCO2 BLDA: 40.9 MMHG (ref 35–45)
PCO2 BLDA: 48.3 MMHG (ref 35–45)
PCO2 BLDA: 50.3 MMHG (ref 35–45)
PEEP: 5
PH SMN: 7.25 [PH] (ref 7.35–7.45)
PH SMN: 7.33 [PH] (ref 7.35–7.45)
PH SMN: 7.35 [PH] (ref 7.35–7.45)
PH SMN: 7.36 [PH] (ref 7.35–7.45)
PH SMN: 7.37 [PH] (ref 7.35–7.45)
PH SMN: 7.4 [PH] (ref 7.35–7.45)
PHOSPHATE SERPL-MCNC: 5.1 MG/DL
PHOSPHATE SERPL-MCNC: 5.1 MG/DL
PHOSPHATE SERPL-MCNC: 6.2 MG/DL
PIP: 13
PIP: 14
PIP: 8
PLATELET # BLD AUTO: 12 K/UL
PLATELET # BLD AUTO: 14 K/UL
PLATELET # BLD AUTO: 18 K/UL
PLATELET # BLD AUTO: 19 K/UL
PLATELET # BLD AUTO: 20 K/UL
PLATELET # BLD AUTO: 34 K/UL
PLATELET BLD QL SMEAR: ABNORMAL
PMV BLD AUTO: 11.6 FL
PMV BLD AUTO: 12.3 FL
PMV BLD AUTO: 13.1 FL
PMV BLD AUTO: 13.3 FL
PMV BLD AUTO: 13.5 FL
PMV BLD AUTO: 13.6 FL
PO2 BLDA: 159 MMHG (ref 80–100)
PO2 BLDA: 168 MMHG (ref 80–100)
PO2 BLDA: 182 MMHG (ref 80–100)
PO2 BLDA: 184 MMHG (ref 80–100)
PO2 BLDA: 202 MMHG (ref 80–100)
PO2 BLDA: 46 MMHG (ref 40–60)
POC BE: -1 MMOL/L
POC BE: -2 MMOL/L
POC BE: -3 MMOL/L
POC BE: -3 MMOL/L
POC BE: -4 MMOL/L
POC BE: -5 MMOL/L
POC IONIZED CALCIUM: 0.99 MMOL/L (ref 1.06–1.42)
POC SATURATED O2: 100 % (ref 95–100)
POC SATURATED O2: 77 % (ref 95–100)
POC SATURATED O2: 99 % (ref 95–100)
POC SATURATED O2: 99 % (ref 95–100)
POC TCO2: 22 MMOL/L (ref 23–27)
POC TCO2: 24 MMOL/L (ref 23–27)
POC TCO2: 27 MMOL/L (ref 24–29)
POCT GLUCOSE: 112 MG/DL (ref 70–110)
POCT GLUCOSE: 116 MG/DL (ref 70–110)
POCT GLUCOSE: 125 MG/DL (ref 70–110)
POCT GLUCOSE: 128 MG/DL (ref 70–110)
POCT GLUCOSE: 162 MG/DL (ref 70–110)
POCT GLUCOSE: 178 MG/DL (ref 70–110)
POCT GLUCOSE: 185 MG/DL (ref 70–110)
POCT GLUCOSE: 189 MG/DL (ref 70–110)
POCT GLUCOSE: 196 MG/DL (ref 70–110)
POCT GLUCOSE: 202 MG/DL (ref 70–110)
POCT GLUCOSE: 213 MG/DL (ref 70–110)
POCT GLUCOSE: 220 MG/DL (ref 70–110)
POCT GLUCOSE: 222 MG/DL (ref 70–110)
POCT GLUCOSE: 232 MG/DL (ref 70–110)
POCT GLUCOSE: 243 MG/DL (ref 70–110)
POCT GLUCOSE: 94 MG/DL (ref 70–110)
POIKILOCYTOSIS BLD QL SMEAR: SLIGHT
POLYCHROMASIA BLD QL SMEAR: ABNORMAL
POLYCHROMASIA BLD QL SMEAR: ABNORMAL
POTASSIUM BLD-SCNC: 3.9 MMOL/L (ref 3.5–5.1)
POTASSIUM SERPL-SCNC: 4.2 MMOL/L
POTASSIUM SERPL-SCNC: 4.6 MMOL/L
POTASSIUM SERPL-SCNC: 4.7 MMOL/L
POTASSIUM SERPL-SCNC: 4.7 MMOL/L
PROT SERPL-MCNC: 4 G/DL
PROTHROMBIN TIME: 13 SEC
PROTHROMBIN TIME: 13.5 SEC
PROTHROMBIN TIME: 13.8 SEC
PROTHROMBIN TIME: 13.9 SEC
PROTHROMBIN TIME: 14.1 SEC
PS: 10
RBC # BLD AUTO: 2.45 M/UL
RBC # BLD AUTO: 2.62 M/UL
RBC # BLD AUTO: 2.69 M/UL
RBC # BLD AUTO: 2.93 M/UL
RBC # BLD AUTO: 2.97 M/UL
RBC # BLD AUTO: 3.17 M/UL
SAMPLE: ABNORMAL
SCHISTOCYTES BLD QL SMEAR: ABNORMAL
SCHISTOCYTES BLD QL SMEAR: ABNORMAL
SITE: ABNORMAL
SODIUM BLD-SCNC: 145 MMOL/L (ref 136–145)
SODIUM SERPL-SCNC: 138 MMOL/L
SODIUM SERPL-SCNC: 139 MMOL/L
SODIUM SERPL-SCNC: 142 MMOL/L
SODIUM SERPL-SCNC: 142 MMOL/L
SP02: 100
SP02: 99
SP02: 99
TACROLIMUS BLD-MCNC: 2.8 NG/ML
TRANS PLATPHERESIS VOL PATIENT: NORMAL ML
WBC # BLD AUTO: 4.99 K/UL
WBC # BLD AUTO: 5.17 K/UL
WBC # BLD AUTO: 5.81 K/UL
WBC # BLD AUTO: 6.64 K/UL
WBC # BLD AUTO: 6.77 K/UL
WBC # BLD AUTO: 8.38 K/UL

## 2018-07-24 PROCEDURE — 85610 PROTHROMBIN TIME: CPT

## 2018-07-24 PROCEDURE — P9035 PLATELET PHERES LEUKOREDUCED: HCPCS

## 2018-07-24 PROCEDURE — 99900026 HC AIRWAY MAINTENANCE (STAT)

## 2018-07-24 PROCEDURE — 82248 BILIRUBIN DIRECT: CPT

## 2018-07-24 PROCEDURE — 25000003 PHARM REV CODE 250: Performed by: PEDIATRICS

## 2018-07-24 PROCEDURE — 82803 BLOOD GASES ANY COMBINATION: CPT

## 2018-07-24 PROCEDURE — 25000003 PHARM REV CODE 250: Performed by: INTERNAL MEDICINE

## 2018-07-24 PROCEDURE — S5010 5% DEXTROSE AND 0.45% SALINE: HCPCS | Performed by: PEDIATRICS

## 2018-07-24 PROCEDURE — 63600175 PHARM REV CODE 636 W HCPCS: Performed by: PEDIATRICS

## 2018-07-24 PROCEDURE — 80048 BASIC METABOLIC PNL TOTAL CA: CPT

## 2018-07-24 PROCEDURE — 82977 ASSAY OF GGT: CPT

## 2018-07-24 PROCEDURE — 80197 ASSAY OF TACROLIMUS: CPT

## 2018-07-24 PROCEDURE — 80048 BASIC METABOLIC PNL TOTAL CA: CPT | Mod: 91

## 2018-07-24 PROCEDURE — 25000003 PHARM REV CODE 250: Performed by: NURSE PRACTITIONER

## 2018-07-24 PROCEDURE — C9113 INJ PANTOPRAZOLE SODIUM, VIA: HCPCS | Performed by: STUDENT IN AN ORGANIZED HEALTH CARE EDUCATION/TRAINING PROGRAM

## 2018-07-24 PROCEDURE — 27200966 HC CLOSED SUCTION SYSTEM

## 2018-07-24 PROCEDURE — 84450 TRANSFERASE (AST) (SGOT): CPT | Mod: 91

## 2018-07-24 PROCEDURE — 63600175 PHARM REV CODE 636 W HCPCS: Performed by: STUDENT IN AN ORGANIZED HEALTH CARE EDUCATION/TRAINING PROGRAM

## 2018-07-24 PROCEDURE — 25000003 PHARM REV CODE 250: Performed by: STUDENT IN AN ORGANIZED HEALTH CARE EDUCATION/TRAINING PROGRAM

## 2018-07-24 PROCEDURE — 99233 SBSQ HOSP IP/OBS HIGH 50: CPT | Mod: ,,, | Performed by: INTERNAL MEDICINE

## 2018-07-24 PROCEDURE — 94761 N-INVAS EAR/PLS OXIMETRY MLT: CPT

## 2018-07-24 PROCEDURE — 80053 COMPREHEN METABOLIC PANEL: CPT

## 2018-07-24 PROCEDURE — 63600175 PHARM REV CODE 636 W HCPCS: Performed by: INTERNAL MEDICINE

## 2018-07-24 PROCEDURE — 85730 THROMBOPLASTIN TIME PARTIAL: CPT

## 2018-07-24 PROCEDURE — 99232 SBSQ HOSP IP/OBS MODERATE 35: CPT | Mod: GC,,, | Performed by: INTERNAL MEDICINE

## 2018-07-24 PROCEDURE — 83735 ASSAY OF MAGNESIUM: CPT | Mod: 91

## 2018-07-24 PROCEDURE — 99900035 HC TECH TIME PER 15 MIN (STAT)

## 2018-07-24 PROCEDURE — 63600175 PHARM REV CODE 636 W HCPCS: Performed by: PHYSICIAN ASSISTANT

## 2018-07-24 PROCEDURE — 83605 ASSAY OF LACTIC ACID: CPT

## 2018-07-24 PROCEDURE — 85347 COAGULATION TIME ACTIVATED: CPT

## 2018-07-24 PROCEDURE — 94003 VENT MGMT INPAT SUBQ DAY: CPT

## 2018-07-24 PROCEDURE — 82800 BLOOD PH: CPT

## 2018-07-24 PROCEDURE — 85025 COMPLETE CBC W/AUTO DIFF WBC: CPT | Mod: 91

## 2018-07-24 PROCEDURE — 20000000 HC ICU ROOM

## 2018-07-24 PROCEDURE — 80069 RENAL FUNCTION PANEL: CPT

## 2018-07-24 PROCEDURE — 25000003 PHARM REV CODE 250: Performed by: PHYSICIAN ASSISTANT

## 2018-07-24 PROCEDURE — 99233 SBSQ HOSP IP/OBS HIGH 50: CPT | Mod: ,,, | Performed by: ANESTHESIOLOGY

## 2018-07-24 PROCEDURE — 84100 ASSAY OF PHOSPHORUS: CPT

## 2018-07-24 PROCEDURE — 37799 UNLISTED PX VASCULAR SURGERY: CPT

## 2018-07-24 RX ORDER — GLUCAGON 1 MG
1 KIT INJECTION
Status: DISCONTINUED | OUTPATIENT
Start: 2018-07-24 | End: 2018-07-26

## 2018-07-24 RX ORDER — IBUPROFEN 200 MG
16 TABLET ORAL
Status: DISCONTINUED | OUTPATIENT
Start: 2018-07-24 | End: 2018-07-26

## 2018-07-24 RX ORDER — INSULIN ASPART 100 [IU]/ML
0-4 INJECTION, SOLUTION INTRAVENOUS; SUBCUTANEOUS
Status: DISCONTINUED | OUTPATIENT
Start: 2018-07-24 | End: 2018-07-26

## 2018-07-24 RX ORDER — IBUPROFEN 200 MG
24 TABLET ORAL
Status: DISCONTINUED | OUTPATIENT
Start: 2018-07-24 | End: 2018-07-26

## 2018-07-24 RX ORDER — HYDROCODONE BITARTRATE AND ACETAMINOPHEN 500; 5 MG/1; MG/1
TABLET ORAL
Status: DISCONTINUED | OUTPATIENT
Start: 2018-07-24 | End: 2018-07-28

## 2018-07-24 RX ORDER — DEXMEDETOMIDINE HYDROCHLORIDE 4 UG/ML
0.2 INJECTION, SOLUTION INTRAVENOUS CONTINUOUS
Status: DISCONTINUED | OUTPATIENT
Start: 2018-07-24 | End: 2018-07-25

## 2018-07-24 RX ADMIN — Medication 220 MG: at 08:07

## 2018-07-24 RX ADMIN — Medication 1 MG: at 05:07

## 2018-07-24 RX ADMIN — SULFAMETHOXAZOLE AND TRIMETHOPRIM 1 TABLET: 400; 80 TABLET ORAL at 08:07

## 2018-07-24 RX ADMIN — SODIUM CHLORIDE 1.3 UNITS/HR: 9 INJECTION, SOLUTION INTRAVENOUS at 12:07

## 2018-07-24 RX ADMIN — OXYCODONE HYDROCHLORIDE AND ACETAMINOPHEN 1000 MG: 500 TABLET ORAL at 08:07

## 2018-07-24 RX ADMIN — DEXTROSE AND SODIUM CHLORIDE: 5; .45 INJECTION, SOLUTION INTRAVENOUS at 04:07

## 2018-07-24 RX ADMIN — METHYLPREDNISOLONE SODIUM SUCCINATE 100 MG: 125 INJECTION, POWDER, FOR SOLUTION INTRAMUSCULAR; INTRAVENOUS at 09:07

## 2018-07-24 RX ADMIN — TRIAMCINOLONE ACETONIDE: 1 CREAM TOPICAL at 08:07

## 2018-07-24 RX ADMIN — Medication 220 MG: at 09:07

## 2018-07-24 RX ADMIN — PHYTONADIONE 10 MG: 10 INJECTION, EMULSION INTRAMUSCULAR; INTRAVENOUS; SUBCUTANEOUS at 06:07

## 2018-07-24 RX ADMIN — VASOPRESSIN 0.02 UNITS/MIN: 20 INJECTION INTRAVENOUS at 01:07

## 2018-07-24 RX ADMIN — LEVETIRACETAM 500 MG: 5 INJECTION INTRAVENOUS at 09:07

## 2018-07-24 RX ADMIN — INSULIN ASPART 2 UNITS: 100 INJECTION, SOLUTION INTRAVENOUS; SUBCUTANEOUS at 03:07

## 2018-07-24 RX ADMIN — OCTREOTIDE ACETATE 100 MCG: 100 INJECTION, SOLUTION INTRAVENOUS; SUBCUTANEOUS at 06:07

## 2018-07-24 RX ADMIN — DEXMEDETOMIDINE HYDROCHLORIDE 0.2 MCG/KG/HR: 4 INJECTION, SOLUTION INTRAVENOUS at 06:07

## 2018-07-24 RX ADMIN — INSULIN ASPART 1 UNITS: 100 INJECTION, SOLUTION INTRAVENOUS; SUBCUTANEOUS at 08:07

## 2018-07-24 RX ADMIN — ATORVASTATIN CALCIUM 40 MG: 20 TABLET, FILM COATED ORAL at 08:07

## 2018-07-24 RX ADMIN — Medication 1 MG: at 08:07

## 2018-07-24 RX ADMIN — Medication 1000 MG: at 09:07

## 2018-07-24 RX ADMIN — SERTRALINE HYDROCHLORIDE 50 MG: 50 TABLET ORAL at 08:07

## 2018-07-24 RX ADMIN — HEPARIN SODIUM 5000 UNITS: 5000 INJECTION, SOLUTION INTRAVENOUS; SUBCUTANEOUS at 06:07

## 2018-07-24 RX ADMIN — HEPARIN SODIUM 5000 UNITS: 5000 INJECTION, SOLUTION INTRAVENOUS; SUBCUTANEOUS at 09:07

## 2018-07-24 RX ADMIN — INSULIN ASPART 1 UNITS: 100 INJECTION, SOLUTION INTRAVENOUS; SUBCUTANEOUS at 11:07

## 2018-07-24 RX ADMIN — HYDROMORPHONE HYDROCHLORIDE 0.2 MG: 1 INJECTION, SOLUTION INTRAMUSCULAR; INTRAVENOUS; SUBCUTANEOUS at 03:07

## 2018-07-24 RX ADMIN — DEXTROSE 40 MG: 50 INJECTION, SOLUTION INTRAVENOUS at 08:07

## 2018-07-24 RX ADMIN — NYSTATIN 500000 UNITS: 500000 SUSPENSION ORAL at 08:07

## 2018-07-24 RX ADMIN — FLUCONAZOLE 200 MG: 200 TABLET ORAL at 08:07

## 2018-07-24 RX ADMIN — MORPHINE 450 MG: 10 SOLUTION ORAL at 08:07

## 2018-07-24 RX ADMIN — CEFTRIAXONE SODIUM 2 G: 2 INJECTION, POWDER, FOR SOLUTION INTRAMUSCULAR; INTRAVENOUS at 04:07

## 2018-07-24 RX ADMIN — NYSTATIN 500000 UNITS: 500000 SUSPENSION ORAL at 04:07

## 2018-07-24 RX ADMIN — DEXTROSE 40 MG: 50 INJECTION, SOLUTION INTRAVENOUS at 03:07

## 2018-07-24 RX ADMIN — NYSTATIN 500000 UNITS: 500000 SUSPENSION ORAL at 01:07

## 2018-07-24 RX ADMIN — HYDROMORPHONE HYDROCHLORIDE 0.2 MG: 1 INJECTION, SOLUTION INTRAMUSCULAR; INTRAVENOUS; SUBCUTANEOUS at 07:07

## 2018-07-24 RX ADMIN — LEVETIRACETAM 500 MG: 5 INJECTION INTRAVENOUS at 08:07

## 2018-07-24 RX ADMIN — METHYLPREDNISOLONE SODIUM SUCCINATE 100 MG: 125 INJECTION, POWDER, FOR SOLUTION INTRAMUSCULAR; INTRAVENOUS at 08:07

## 2018-07-24 RX ADMIN — SODIUM CHLORIDE 22 UNITS/HR: 9 INJECTION, SOLUTION INTRAVENOUS at 02:07

## 2018-07-24 NOTE — PROGRESS NOTES
[facility]  Nephrology  Progress Note    Patient Name: Alon Adamson  MRN: [id number]  Admission Date: 6/22/2018  Hospital Length of Stay: 32 days  Attending Provider: Williams Moon Jr., MD   Primary Care Physician: Mckinley Vides MD  Principal Problem:Acute kidney injury superimposed on chronic kidney disease    Subjective:     HPI: Mr. Adamson is 63 yr old male with decompensated alcoholic cirrhosis, CKD III and CAD admitted here on 06/22/18 admitted for bilateral LE hypervolemia and VICKY on CKD III with cr of 2.3 baseline around 1.5. Patient has multiple hospitalization in the past secondary to decompensated liver failure. Patient is currently on transplant team. During current admission patient was getting lasix and albumin intermittently for VICKY however renal function was not getting better. Patient hospital course complicated by hepatic encephalopathy with some improvement of mentation with lactulose. Also treated for cellulitis with 7 days of vancomycin.Nephrology is consulted for worsening/not improving VICKY despite lasix/albumin.     Per chart review patient has no hypotensive episodes, BP mostly above 100's. Has not received nephrotoxins such as NSAIDs/contrast media. No sever sepsis/septic shock or acute blood loss during current admit. UA with 2+ leukocytes and Hyaline casts, no wbc/rbc cast or proteinuria. Urine Na+ < 20.    Patient was transferred to ICU on 7/13/2018 after being more lethargic and hypoactive.  After two days was stepped down back to [facility]plant [facility].       Interval History: patient is still intubated and sedated. v/s are stable. Yesterday CRRT was not complete due to 2 episode of BP drop. Cr 2.4, electrolytes in normal range. Patient still has low side bp reading, hold CRRT for now and will monitor patient to evaluate need to start CRRT anytime later today.  Patient is on abx regimen of rocephin, bactrim, and valgancyclovir. .    Review of patient's  allergies indicates:   Allergen Reactions    Nsaids (non-steroidal anti-inflammatory drug)      D/t to liver disease.    Tylenol [acetaminophen]      D/t liver disease.    Penicillins Other (See Comments)     Tolerated pip-tazo in 8/2016 without issue  Tolerated cefepime 7/2018 without issue  Since childhood was told not to take it     Current Facility-Administered Medications   Medication Frequency    0.9%  NaCl infusion (CRRT USE ONLY) Continuous    0.9%  NaCl infusion (for blood administration) Q24H PRN    0.9%  NaCl infusion (for blood administration) Q24H PRN    acetaminophen tablet 650 mg Q8H PRN    ascorbic acid (vitamin C) tablet 1,000 mg Daily    atorvastatin tablet 40 mg Daily    cefTRIAXone (ROCEPHIN) 2 g in dextrose 5 % 50 mL IVPB Q24H    dextrose 5 % and 0.45 % NaCl infusion Continuous    dextrose 50% injection 12.5 g PRN    dextrose 50% injection 12.5 g PRN    dextrose 50% injection 25 g PRN    diphenhydrAMINE-zinc acetate 1-0.1% cream TID PRN    docusate sodium capsule 100 mg Daily    ergocalciferol capsule 50,000 Units Q7 Days    fluconazole tablet 200 mg Daily    glucagon (human recombinant) injection 1 mg PRN    heparin (porcine) injection 5,000 Units Q8H    HYDROmorphone injection 0.2 mg Q2H PRN    insulin regular (Humulin R) 100 Units in sodium chloride 0.9% 100 mL infusion Continuous    lactulose 10 gram/15 mL solution (enema) 200 g Q6H PRN    levETIRAcetam in NaCl (iso-os) IVPB 500 mg Q12H    lidocaine 5 % patch 2 patch Q24H    magnesium sulfate 2g in water 50mL IVPB (premix) PRN    methylPREDNISolone sodium succinate injection 100 mg Q12H    Followed by    [START ON 7/25/2018] methylPREDNISolone sodium succinate injection 80 mg Q12H    Followed by    [START ON 7/26/2018] methylPREDNISolone sodium succinate injection 60 mg Q12H    Followed by    [START ON 7/27/2018] methylPREDNISolone sodium succinate injection 40 mg Q12H    Followed by    [START ON 7/28/2018]  methylPREDNISolone sodium succinate injection 20 mg Q12H    Followed by    [START ON 7/29/2018] predniSONE tablet 20 mg Daily    metoclopramide HCl injection 10 mg QID (AC + HS) PRN    mycophenolate mofetil 200 mg/mL suspension 1,000 mg BID    norepinephrine 4 mg in dextrose 5% 250 mL infusion (premix) (titrating) Continuous    nystatin 100,000 unit/mL suspension 500,000 Units QID    octreotide injection 100 mcg Q8H    omega-3 acid ethyl esters capsule 2 g Daily with breakfast    ondansetron injection 4 mg Q8H PRN    pantoprazole 40 mg in dextrose 5 % 100 mL IVPB (over 15 minutes) (ready to mix system) BID    sertraline tablet 50 mg Daily    sodium chloride 0.9% flush 3 mL PRN    sodium phosphate 20.01 mmol in dextrose 5 % 250 mL IVPB PRN    sodium phosphate 30 mmol in dextrose 5 % 250 mL IVPB PRN    sodium phosphate 39.99 mmol in dextrose 5 % 250 mL IVPB PRN    sulfamethoxazole-trimethoprim 400-80mg per tablet 1 tablet Daily    tacrolimus (PROGRAF) 1 mg/mL oral syringe BID    triamcinolone acetonide 0.1% cream BID    valganciclovir 50 mg/ml oral solution 450 mg Daily    vasopressin (PITRESSIN) 0.2 Units/mL in dextrose 5 % 100 mL infusion Continuous    zinc sulfate capsule 220 mg BID       Objective:     Vital Signs (Most Recent):  Temp: 98.6 °F (37 °C) (07/24/18 0800)  Pulse: 69 (07/24/18 0830)  Resp: 16 (07/24/18 0830)  BP: (!) 113/58 (07/24/18 0800)  SpO2: 99 % (07/24/18 0830)  O2 Device (Oxygen Therapy): ventilator (07/24/18 0800) Vital Signs (24h Range):  Temp:  [89.9 °F (32.2 °C)-99.1 °F (37.3 °C)] 98.6 °F (37 °C)  Pulse:  [63-94] 69  Resp:  [13-51] 16  SpO2:  [96 %-100 %] 99 %  BP: ()/(32-62) 113/58  Arterial Line BP: ()/(34-59) 122/51     Weight: 125.1 kg (275 lb 12.7 oz) (07/24/18 0430)  Body mass index is 35.41 kg/m².  Body surface area is 2.56 meters squared.    I/O last 3 completed shifts:  In: 82825 [P.O.:240; I.V.:52910; Blood:3412; Other:400; NG/GT:25]  Out: 2349  [Urine:442; Drains:7335; Other:451]    Physical Exam   Constitutional: He appears well-developed and well-nourished. No distress.   Neck: Neck supple. No JVD present.   Cardiovascular: Normal rate and regular rhythm.  Exam reveals no friction rub.    No murmur heard.  Pulmonary/Chest: Effort normal and breath sounds normal. No respiratory distress. He has no wheezes. He has no rales.   Musculoskeletal: He exhibits edema.   Neurological:   sedated   Skin: Skin is warm. Capillary refill takes less than 2 seconds. He is not diaphoretic.   Nursing note and vitals reviewed.      Significant Labs:  ABGs:   Recent Labs  Lab 07/24/18  0515   PH 7.353   PCO2 40.8   HCO3 22.7*   POCSATURATED 100   BE -3     CBC:   Recent Labs  Lab 07/24/18  0300   WBC 6.77   RBC 3.17*   HGB 10.1*   HCT 30.6*   PLT 20*   MCV 97   MCH 31.9*   MCHC 33.0     CMP:   Recent Labs  Lab 07/24/18  0300   *  212*   CALCIUM 8.0*  8.0*   ALBUMIN 2.7*  2.7*   PROT 4.0*     142   K 4.2  4.2   CO2 23  23     110   BUN 34*  34*   CREATININE 2.4*  2.4*   ALKPHOS 55   *   *  220*   BILITOT 2.9*     Coagulation:   Recent Labs  Lab 07/24/18  0300 07/24/18  0703   INR 1.4* 1.3*   APTT 36.1*  --             Assessment/Plan:     * Acute kidney injury superimposed on chronic kidney disease    - continue to monitor serial RFPs and strict Is & Os  - Avoid nephrotoxic medications  - Maintain MAP >65  - Hb > 7gm/dL  - ABGs, U/A, Labs in AM   - Patient still has low side bp reading, hold CRRT for now and will monitor patient to evaluate need to start CRRT anytime later today or tomorrow.                      Thank you for your consult. I will follow-up with patient. Please contact us if you have any additional questions.    Chichi Urbina MD  Nephrology  [facility]

## 2018-07-24 NOTE — PROGRESS NOTES
[facility]  Liver Transplant  Progress Note    Patient Name: Alon Adamson  MRN: [id number]  Admission Date: 2018  Hospital Length of Stay: 32 days  Code Status: Full Code  Primary Care Provider: Mckinley Vides MD  Post-Op Day 1 Day Post-Op    Admit Diagnosis: Alcoholic liver cirrhosis  Procedures: Liver transplant    ORGAN:   LIVER  Disease Etiology: Alcoholic Cirrhosis  Donor Type:    - Brain Death  CDC High Risk:   No  Donor CMV Status:   Donor CMV Status: Negative  Donor HBcAB:   Negative  Donor HCV Status:   Negative  Donor HBV TAMAR: Negative  Donor HCV TAMAR: Negative  Whole or Partial: Whole Liver  Biliary Anastomosis: End to End  Arterial Anatomy: Standard    Subjective:     Interval History:  Admitted to the ICU after a liver transplant, intubated and on minimal pressor support. He received intra op dialysis and was transfused 9u pRBC with 4.5L cell saver with a 14L EBL. He has remained HDS and on minimal sedation with 0.2 dilaudid pushes. He is on minimal vent settings, but has not been awake enough to follow commands. He did not tolerated CRRT overnight as he became hypotensive and required additional pressor support. His liver US was unremarkable and his graft function continues to show improvement.    Scheduled Meds:   ascorbic acid (vitamin C)  1,000 mg Oral Daily    atorvastatin  40 mg Oral Daily    cefTRIAXone (ROCEPHIN) IVPB  2 g Intravenous Q24H    docusate sodium  100 mg Oral Daily    ergocalciferol  50,000 Units Oral Q7 Days    fluconazole  200 mg Oral Daily    heparin (porcine)  5,000 Units Subcutaneous Q8H    levetiracetam IVPB  500 mg Intravenous Q12H    lidocaine  2 patch Transdermal Q24H    methylPREDNISolone sodium succinate  100 mg Intravenous Q12H    Followed by    [START ON 2018] methylPREDNISolone sodium succinate  80 mg Intravenous Q12H    Followed by    [START ON 2018] methylPREDNISolone sodium succinate  60 mg Intravenous Q12H     Followed by    [START ON 7/27/2018] methylPREDNISolone sodium succinate  40 mg Intravenous Q12H    Followed by    [START ON 7/28/2018] methylPREDNISolone sodium succinate  20 mg Intravenous Q12H    Followed by    [START ON 7/29/2018] predniSONE  20 mg Oral Daily    mycophenolate mofetil  1,000 mg Oral BID    nystatin  500,000 Units Oral QID    omega-3 acid ethyl esters  2 g Oral Daily with breakfast    pantoprazole 40 mg in dextrose 5 % 100 mL IVPB (over 15 minutes) (ready to mix system)  40 mg Intravenous BID    sertraline  50 mg Oral Daily    sulfamethoxazole-trimethoprim 400-80mg  1 tablet Oral Daily    tacrolimus  1 mg Oral BID    triamcinolone acetonide 0.1%   Topical (Top) BID    valganciclovir 50 mg/ml  450 mg Per NG tube Daily    zinc sulfate  220 mg Oral BID     Continuous Infusions:   sodium chloride 0.9%      dextrose 5 % and 0.45 % NaCl 50 mL/hr at 07/24/18 1100    insulin (HUMAN R) infusion (adults) Stopped (07/24/18 0700)    norepinephrine bitartrate-D5W Stopped (07/24/18 0900)    vasopressin (PITRESSIN) infusion 0.04 Units/min (07/24/18 1100)     PRN Meds:sodium chloride, sodium chloride, acetaminophen, dextrose 50%, dextrose 50%, dextrose 50%, diphenhydrAMINE-zinc acetate 1-0.1%, glucagon (human recombinant), HYDROmorphone, lactulose, magnesium sulfate IVPB, metoclopramide HCl, ondansetron, sodium chloride 0.9%, sodium phosphate IVPB, sodium phosphate IVPB, sodium phosphate IVPB    Review of Systems   Unable to perform ROS: Intubated     Objective:     Vital Signs (Most Recent):  Temp: 98.4 °F (36.9 °C) (07/24/18 1100)  Pulse: 67 (07/24/18 1115)  Resp: 16 (07/24/18 1115)  BP: 138/60 (07/24/18 1100)  SpO2: 100 % (07/24/18 1115) Vital Signs (24h Range):  Temp:  [89.9 °F (32.2 °C)-99.1 °F (37.3 °C)] 98.4 °F (36.9 °C)  Pulse:  [63-94] 67  Resp:  [13-51] 16  SpO2:  [96 %-100 %] 100 %  BP: ()/(41-62) 138/60  Arterial Line BP: ()/(34-70) 133/53     Weight: 125.1 kg (275 lb 12.7  oz)  Body mass index is 35.41 kg/m².    Intake/Output - Last 3 Shifts       07/22 0700 - 07/23 0659 07/23 0700 - 07/24 0659 07/24 0700 - 07/25 0659    P.O. 1226      I.V. (mL/kg) 1300 (10.3) 26906 (128.4)     Blood 985 2427     Other  400     NG/GT  25 300    Total Intake(mL/kg) 3511 (27.8) 55410 (151.2) 300 (2.4)    Urine (mL/kg/hr) 435 (0.1) 312 (0.1) 5 (0)    Drains  7235 (2.4) 314 (0.6)    Other  451 (0.2)     Total Output 435 7998 319    Net +3076 +[id number] -19           Stool Occurrence 8 x            Physical Exam   Constitutional: He appears well-developed and well-nourished.   HENT:   Head: Normocephalic and atraumatic.   Eyes: No scleral icterus.   Cardiovascular: Normal rate and regular rhythm.    No murmur heard.  Abdominal: Soft. He exhibits no distension.   Musculoskeletal: He exhibits edema.   Lymphadenopathy:     He has no cervical adenopathy.   Skin: Skin is warm and dry. He is not diaphoretic. There is erythema.   Dry flaking skin throughout       Laboratory:  Immunosuppressants         Stop Route Frequency     tacrolimus (PROGRAF) 1 mg/mL oral syringe      -- Oral 2 times daily     mycophenolate mofetil 200 mg/mL suspension 1,000 mg      -- Oral 2 times daily        CBC:   Recent Labs  Lab 07/24/18  0703   WBC 6.64   RBC 2.97*   HGB 9.7*   HCT 29.1*   PLT 18*   MCV 98   MCH 32.7*   MCHC 33.3     CMP:   Recent Labs  Lab 07/24/18  0300 07/24/18  0703   *  212* 99   CALCIUM 8.0*  8.0* 7.7*   ALBUMIN 2.7*  2.7*  --    PROT 4.0*  --      142 138   K 4.2  4.2 4.2   CO2 23  23 23     110 107   BUN 34*  34* 36*   CREATININE 2.4*  2.4* 2.7*   ALKPHOS 55  --    *  --    *  220* 169*   BILITOT 2.9*  --      Coagulation:   Recent Labs  Lab 07/24/18  0300 07/24/18  0703   INR 1.4* 1.3*   APTT 36.1*  --      ABGs:   Recent Labs  Lab 07/24/18  0903   PH 7.367   PCO2 39.8   HCO3 22.8*   POCSATURATED 100   BE -3     Labs within the past 24 hours have been  reviewed.    Diagnostic Results:  US Liver Transplant Post:  No results found for this or any previous visit.  I have personally reviewed all pertinent imaging studies.    Assessment/Plan:   Alon Adamson is a 63 y.o. male     Neuro   Seizures    - Oral Keppra transitioned to IV as pt not able to swallow 6/26.  - Resumed oral Keppra since mentation improved.  - EEG 6/26 negative.        Derm   Cholestatic pruritus    - cholestyramine has previously been ineffective, given depressed mood.   - Zoloft for mood and pruritus.        Cardiac/Vascular   Coronary artery disease involving native coronary artery of native heart without angina pectoris    - Continue home ASA.   - 2D Echo 7/9 normal EF (55-60%), trivial tricuspid/mitral regurg.        Renal/   * Acute kidney injury superimposed on chronic kidney disease    -With CKD3   -VICKY initially improving with albumin infusion but with minimal intake 6/25 and 6/26.  - nephrology consulted due to hypervolemia and multiple electrolyte abnormalities (acidosis, hypernatremia)  - Creatinine continues to worsen, likely to need RRT in the near future if doesn't improve. Discussed with Nephrology. Plan for lasix trial per Nephrology. Give IV Lasix 80 mg. Monitor.         CKD (chronic kidney disease), stage III    See VICKY. KTM consulted to assess need for kidney transplant.             Acidosis    Continue oral bicarb replacement. Monitor with daily labs.           Hematology   Coagulopathy    - Secondary to decompensated liver disease.         Thrombocytopenia    - Secondary to splenomegaly from portal HTN- should improve with txp.  - No s/s overt bleed.        Oncology   Pancytopenia    - related to decompensated liver disease         Anemia of chronic disease    - H&H decreased on 7/18 and one unit of PRBCs given for replacement at that time.   - With good response to transfusion.   - H/H stable. Continue to monitor.          Endocrine   Severe protein-calorie  "malnutrition    - Dietary consulted for calorie count.  - Pt eating better accord to family.  Does take boost supplement.  - "pre-hab" regimen and supplements ordered. Dc creon due to excessive diarrhea  - Patient drinking at least 2-3 boost supplements and beneprotein daily.   - If continues to have low intake, consider starting Megace.           GI   Ascites due to alcoholic cirrhosis    - Para 6/25 negative for infection   - Para 7/10 negative for infection. Monitor.           Decompensated hepatic cirrhosis    - Placed status 7 on 6/26 for prescription drug coverage change. Re-activated 7/3. Back on hold d/t frailty and decompensation at the end of last week prompting ICU transfer. Now off hold 7/17.  Plan to submit MELD exception points. Will remeld today at 27.       MELD-Na score: 27 at 7/21/2018  4:01 AM  MELD score: 27 at 7/21/2018  4:01 AM  Calculated from:  Serum Creatinine: 3.7 mg/dL at 7/21/2018  4:00 AM  Serum Sodium: 147 mmol/L (Rounded to 137) at 7/21/2018  4:00 AM  Total Bilirubin: 1.6 mg/dL at 7/21/2018  4:00 AM  INR(ratio): 1.7 at 7/21/2018  4:01 AM  Age: 63 years        Hepatic encephalopathy    - ESLD secondary to EtOH cirrhosis with severe complications (hyperbilirubinemia, hypoalbuminemia, severe malnutrition, hepatic encephalopathy, thrombocytopenia, splenomegaly, ascites, hypervolemia, coaguloopathy, portal HTN), seizure disorder on Keppra, CKD3, CAD   - has frequent episodes of lethargy/confusion/slowing when holding lactulose   - admitted to SICU 7/13/18 for worsening encephalopathy and hypothermia  - PO lactulose TID, PRN lactulose enemas, Rifaximin  - sleepy this AM. Given lactulose enema with improvement in mentation. Continue oral lactulose and PRN lactulose enemas for HE control.           Orthopedic   Hypothermia    - with episodes of hypothermia on 7/17 and 7/18.   - BS antibxs on board for prophylaxis.   - blood cultures NGTD.   - ID consulted for further management.         "   Other   Organ transplant candidate    Neuro:   - minimal sedation  - difficulty with eye opening and not following commands  - continue to monitor throughout the day and extubate once alert     Pulmonary:   - 16/50%/5 drawing tidal volumes of 450-600  - 7.35/82/40.8/24 LA 0.94  - plan to extubate once neuro status improves     Cardiac:  - HDS  - vaso 0.04  - CVP 3-6     Renal:   - BUN/Cr 34/2.4 up from Cr 2 post op  - UOP minimal ~10cc overnight  - follow up nephrology recs for dialysis options     Infectious Disease:   - WBC 6.7  - rocephin for donor positive cultures  - Trend WBC     Hematology/Oncology:  - H/H 10.1/30.6  - Trend CBC     Endocrine:  - insulin gtt 0.9  - monitor     Fluids/Electrolytes/Nutrition/GI:   - NPO  - will plan for enteral feeds if still intubated tomorrow  - Trend CMP  - Replace Lytes PRN     Pain Management::   - dilaudid 0.2 mg prn, hold while intubated     Dispo:  Continue SICU care                Physical deconditioning    - PT/OT consulted.    - Encourage OOBTC for several hours each day, walking with walker with PT        Hypoalbuminemia due to protein-calorie malnutrition    - see severe protein-calorie malnutrition         Bilateral edema of lower extremity    - Completed vanc x 7 days for BLE cellulitis  - 2D Echo 7/9 normal EF (55-60%), trivial tricuspid/mitral regurg.  - Vanc now restarted given recent episodes of hypothermia.   -Started trial of lasix per Nephrology.             The patients clinical status was discussed at multidisplinary rounds, involving transplant surgery, transplant medicine, pharmacy, nursing, nutrition, and social work.    Terry Huff MD  Liver Transplant  [facility]

## 2018-07-24 NOTE — SUBJECTIVE & OBJECTIVE
Interval History: patient is still intubated and sedated. v/s are stable. Yesterday CRRT was not complete due to 2 episode of BP drop. Cr 2.4, electrolytes in normal range. Patient still has low side bp reading, hold CRRT for now and will monitor patient to evaluate need to start CRRT anytime later today.  Patient is on abx regimen of rocephin, bactrim, and valgancyclovir. .    Review of patient's allergies indicates:   Allergen Reactions    Nsaids (non-steroidal anti-inflammatory drug)      D/t to liver disease.    Tylenol [acetaminophen]      D/t liver disease.    Penicillins Other (See Comments)     Tolerated pip-tazo in 8/2016 without issue  Tolerated cefepime 7/2018 without issue  Since childhood was told not to take it     Current Facility-Administered Medications   Medication Frequency    0.9%  NaCl infusion (CRRT USE ONLY) Continuous    0.9%  NaCl infusion (for blood administration) Q24H PRN    0.9%  NaCl infusion (for blood administration) Q24H PRN    acetaminophen tablet 650 mg Q8H PRN    ascorbic acid (vitamin C) tablet 1,000 mg Daily    atorvastatin tablet 40 mg Daily    cefTRIAXone (ROCEPHIN) 2 g in dextrose 5 % 50 mL IVPB Q24H    dextrose 5 % and 0.45 % NaCl infusion Continuous    dextrose 50% injection 12.5 g PRN    dextrose 50% injection 12.5 g PRN    dextrose 50% injection 25 g PRN    diphenhydrAMINE-zinc acetate 1-0.1% cream TID PRN    docusate sodium capsule 100 mg Daily    ergocalciferol capsule 50,000 Units Q7 Days    fluconazole tablet 200 mg Daily    glucagon (human recombinant) injection 1 mg PRN    heparin (porcine) injection 5,000 Units Q8H    HYDROmorphone injection 0.2 mg Q2H PRN    insulin regular (Humulin R) 100 Units in sodium chloride 0.9% 100 mL infusion Continuous    lactulose 10 gram/15 mL solution (enema) 200 g Q6H PRN    levETIRAcetam in NaCl (iso-os) IVPB 500 mg Q12H    lidocaine 5 % patch 2 patch Q24H    magnesium sulfate 2g in water 50mL IVPB  (premix) PRN    methylPREDNISolone sodium succinate injection 100 mg Q12H    Followed by    [START ON 7/25/2018] methylPREDNISolone sodium succinate injection 80 mg Q12H    Followed by    [START ON 7/26/2018] methylPREDNISolone sodium succinate injection 60 mg Q12H    Followed by    [START ON 7/27/2018] methylPREDNISolone sodium succinate injection 40 mg Q12H    Followed by    [START ON 7/28/2018] methylPREDNISolone sodium succinate injection 20 mg Q12H    Followed by    [START ON 7/29/2018] predniSONE tablet 20 mg Daily    metoclopramide HCl injection 10 mg QID (AC + HS) PRN    mycophenolate mofetil 200 mg/mL suspension 1,000 mg BID    norepinephrine 4 mg in dextrose 5% 250 mL infusion (premix) (titrating) Continuous    nystatin 100,000 unit/mL suspension 500,000 Units QID    octreotide injection 100 mcg Q8H    omega-3 acid ethyl esters capsule 2 g Daily with breakfast    ondansetron injection 4 mg Q8H PRN    pantoprazole 40 mg in dextrose 5 % 100 mL IVPB (over 15 minutes) (ready to mix system) BID    sertraline tablet 50 mg Daily    sodium chloride 0.9% flush 3 mL PRN    sodium phosphate 20.01 mmol in dextrose 5 % 250 mL IVPB PRN    sodium phosphate 30 mmol in dextrose 5 % 250 mL IVPB PRN    sodium phosphate 39.99 mmol in dextrose 5 % 250 mL IVPB PRN    sulfamethoxazole-trimethoprim 400-80mg per tablet 1 tablet Daily    tacrolimus (PROGRAF) 1 mg/mL oral syringe BID    triamcinolone acetonide 0.1% cream BID    valganciclovir 50 mg/ml oral solution 450 mg Daily    vasopressin (PITRESSIN) 0.2 Units/mL in dextrose 5 % 100 mL infusion Continuous    zinc sulfate capsule 220 mg BID       Objective:     Vital Signs (Most Recent):  Temp: 98.6 °F (37 °C) (07/24/18 0800)  Pulse: 69 (07/24/18 0830)  Resp: 16 (07/24/18 0830)  BP: (!) 113/58 (07/24/18 0800)  SpO2: 99 % (07/24/18 0830)  O2 Device (Oxygen Therapy): ventilator (07/24/18 0800) Vital Signs (24h Range):  Temp:  [89.9 °F (32.2 °C)-99.1 °F  (37.3 °C)] 98.6 °F (37 °C)  Pulse:  [63-94] 69  Resp:  [13-51] 16  SpO2:  [96 %-100 %] 99 %  BP: ()/(32-62) 113/58  Arterial Line BP: ()/(34-59) 122/51     Weight: 125.1 kg (275 lb 12.7 oz) (07/24/18 0430)  Body mass index is 35.41 kg/m².  Body surface area is 2.56 meters squared.    I/O last 3 completed shifts:  In: 79907 [P.O.:240; I.V.:62560; Blood:3412; Other:400; NG/GT:25]  Out: 8228 [Urine:442; Drains:7335; Other:451]    Physical Exam   Constitutional: He appears well-developed and well-nourished. No distress.   Neck: Neck supple. No JVD present.   Cardiovascular: Normal rate and regular rhythm.  Exam reveals no friction rub.    No murmur heard.  Pulmonary/Chest: Effort normal and breath sounds normal. No respiratory distress. He has no wheezes. He has no rales.   Musculoskeletal: He exhibits edema.   Neurological:   sedated   Skin: Skin is warm. Capillary refill takes less than 2 seconds. He is not diaphoretic.   Nursing note and vitals reviewed.      Significant Labs:  ABGs:   Recent Labs  Lab 07/24/18  0515   PH 7.353   PCO2 40.8   HCO3 22.7*   POCSATURATED 100   BE -3     CBC:   Recent Labs  Lab 07/24/18  0300   WBC 6.77   RBC 3.17*   HGB 10.1*   HCT 30.6*   PLT 20*   MCV 97   MCH 31.9*   MCHC 33.0     CMP:   Recent Labs  Lab 07/24/18  0300   *  212*   CALCIUM 8.0*  8.0*   ALBUMIN 2.7*  2.7*   PROT 4.0*     142   K 4.2  4.2   CO2 23  23     110   BUN 34*  34*   CREATININE 2.4*  2.4*   ALKPHOS 55   *   *  220*   BILITOT 2.9*     Coagulation:   Recent Labs  Lab 07/24/18  0300 07/24/18  0703   INR 1.4* 1.3*   APTT 36.1*  --

## 2018-07-24 NOTE — SUBJECTIVE & OBJECTIVE
Interval History:   Intubated  Low pressures on CRRT    Review of Systems   Unable to perform ROS: Intubated     Objective:     Vital Signs (Most Recent):  Temp: 98.2 °F (36.8 °C) (07/24/18 1830)  Pulse: (!) 51 (07/24/18 1830)  Resp: (!) 6 (07/24/18 1830)  BP: 125/60 (07/24/18 1800)  SpO2: 100 % (07/24/18 1830) Vital Signs (24h Range):  Temp:  [89.9 °F (32.2 °C)-99.1 °F (37.3 °C)] 98.2 °F (36.8 °C)  Pulse:  [51-91] 51  Resp:  [6-45] 6  SpO2:  [98 %-100 %] 100 %  BP: ()/(51-79) 125/60  Arterial Line BP: ()/(40-70) 153/57     Weight: 125.1 kg (275 lb 12.7 oz)  Body mass index is 35.41 kg/m².    Estimated Creatinine Clearance: 35.9 mL/min (A) (based on SCr of 3 mg/dL (H)).    Physical Exam   Constitutional: He appears well-developed and well-nourished. No distress.   HENT:   Head: Normocephalic and atraumatic.   Intubated. IJ line    Pulmonary/Chest:   Coarse breath sounds bilaterally   Abdominal: Soft. He exhibits no distension. There is no tenderness.   Musculoskeletal: He exhibits edema.   Neurological:   No spontaneous movements   Skin: Skin is warm and dry. No rash noted. He is not diaphoretic. No erythema.   Psychiatric: He has a normal mood and affect. His behavior is normal.       Significant Labs: All pertinent labs within the past 24 hours have been reviewed.    Significant Imaging: I have reviewed all pertinent imaging results/findings within the past 24 hours.

## 2018-07-24 NOTE — PROGRESS NOTES
[facility]  Infectious Disease  Progress Note    Patient Name: Alon Adamson  MRN: [id number]  Admission Date: 6/22/2018  Length of Stay: 32 days  Attending Physician: Williams Moon Jr., MD  Primary Care Provider: Mckinley Vides MD    Isolation Status: No active isolations  Assessment/Plan:      Positive blood culture in cadaveric donor    63-year-old male  - EtOH cirrhosis s/p OLT 7/23/2018, CMV D-/R-, steroid induction, on tacro/MMF/pred  - Pre-transplant course c/b hypothermia  - Donor with E. Coli becteremia resistant to cipro     Recommendations:  - Complete 14 day course of ceftriaxone 2g IV qdaily (day 1 is 7/23/2018)              Anticipated Disposition: clinically stable    Thank you for your consult. I will sign off. Please contact us if you have any additional questions.    Viry Calvo MD  Infectious Disease  [facility]    Subjective:     Principal Problem:Acute kidney injury superimposed on chronic kidney disease    HPI: 62 y/o M h/o ETOH cirrhosis (PSE, pHTN) listed for liver txp, CKD3, CAD recently admitted for worsening PSE  requiring intubation admitted from clinic with hypervolemia, VICKY and b/l LE erythema. Here he has been afebrile, with diagnostic paracentesis unremarkable, found to have CONS 2/4 blood culture bottles with repeat negative.  He has been on empiric vancomycin and ceftriaxone and b/l LE erythema has improved and overall (combined with other interventions) his mental status has improved.  He remains deconditioned.    7/19: reconsulted for hypothermia. Pt was treated for 7 days on vancomycin for treatment of cellulitis, blood culture that was + for CONs felt to be contaminate. He has been persistently hypothermic, requiring warming blanket for the past day. Pt states that he otherwise feels well. He was started on vanc and cefepime for empiric treatment.  Interval History:   Intubated  Low pressures on CRRT    Review of Systems   Unable  to perform ROS: Intubated     Objective:     Vital Signs (Most Recent):  Temp: 98.2 °F (36.8 °C) (07/24/18 1830)  Pulse: (!) 51 (07/24/18 1830)  Resp: (!) 6 (07/24/18 1830)  BP: 125/60 (07/24/18 1800)  SpO2: 100 % (07/24/18 1830) Vital Signs (24h Range):  Temp:  [89.9 °F (32.2 °C)-99.1 °F (37.3 °C)] 98.2 °F (36.8 °C)  Pulse:  [51-91] 51  Resp:  [6-45] 6  SpO2:  [98 %-100 %] 100 %  BP: ()/(51-79) 125/60  Arterial Line BP: ()/(40-70) 153/57     Weight: 125.1 kg (275 lb 12.7 oz)  Body mass index is 35.41 kg/m².    Estimated Creatinine Clearance: 35.9 mL/min (A) (based on SCr of 3 mg/dL (H)).    Physical Exam   Constitutional: He appears well-developed and well-nourished. No distress.   HENT:   Head: Normocephalic and atraumatic.   Intubated. IJ line    Pulmonary/Chest:   Coarse breath sounds bilaterally   Abdominal: Soft. He exhibits no distension. There is no tenderness.   Musculoskeletal: He exhibits edema.   Neurological:   No spontaneous movements   Skin: Skin is warm and dry. No rash noted. He is not diaphoretic. No erythema.   Psychiatric: He has a normal mood and affect. His behavior is normal.       Significant Labs: All pertinent labs within the past 24 hours have been reviewed.    Significant Imaging: I have reviewed all pertinent imaging results/findings within the past 24 hours.

## 2018-07-24 NOTE — SUBJECTIVE & OBJECTIVE
"Interval HPI:   Overnight events:  Patient placed on IIP overnight during and after surgery.  Did well.  Currently held initially for BG in 90s, since BG trending upward 160s.    Diet - NPO  Afebrile no hypoglycemia    /60 (BP Location: Left arm, Patient Position: Lying)   Pulse 67   Temp 98.4 °F (36.9 °C) (Core ([place]))   Resp 16   Ht 6' 2" (1.88 m)   Wt 125.1 kg (275 lb 12.7 oz)   SpO2 100%   BMI 35.41 kg/m²     Labs Reviewed and Include      Recent Labs  Lab 07/24/18  0300 07/24/18  0703   *  212* 99   CALCIUM 8.0*  8.0* 7.7*   ALBUMIN 2.7*  2.7*  --    PROT 4.0*  --      142 138   K 4.2  4.2 4.2   CO2 23  23 23     110 107   BUN 34*  34* 36*   CREATININE 2.4*  2.4* 2.7*   ALKPHOS 55  --    *  --    *  220* 169*   BILITOT 2.9*  --      Lab Results   Component Value Date    WBC 6.64 07/24/2018    HGB 9.7 (L) 07/24/2018    HCT 29.1 (L) 07/24/2018    MCV 98 07/24/2018    PLT 18 (LL) 07/24/2018       Recent Labs  Lab 07/20/18  0639   TSH 2.714   FREET4 0.53*     Lab Results   Component Value Date    HGBA1C 5.2 07/22/2018       Nutritional status:   Body mass index is 35.41 kg/m².  Lab Results   Component Value Date    ALBUMIN 2.7 (L) 07/24/2018    ALBUMIN 2.7 (L) 07/24/2018    ALBUMIN 2.6 (L) 07/23/2018     Lab Results   Component Value Date    PREALBUMIN 4 (L) 06/30/2018    PREALBUMIN 5 (L) 01/22/2018    PREALBUMIN 7 (L) 08/31/2016       Estimated Creatinine Clearance: 39.9 mL/min (A) (based on SCr of 2.7 mg/dL (H)).    Accu-Checks  Recent Labs      07/24/18   0204  07/24/18   0302  07/24/18   0348  07/24/18   0511  07/24/18   0602  07/24/18   0731  07/24/18   0829  07/24/18   0901  07/24/18   1008  07/24/18   1116   POCTGLUCOSE  232*  196*  189*  125*  112*  94  116*  128*  162*  185*       Current Medications and/or Treatments Impacting Glycemic Control  Immunotherapy:  Immunosuppressants         Stop Route Frequency     tacrolimus (PROGRAF) 1 mg/mL oral " syringe      -- Oral 2 times daily     mycophenolate mofetil 200 mg/mL suspension 1,000 mg      -- Oral 2 times daily        Steroids:   Hormones     Start     Stop Route Frequency Ordered    07/29/18 0900  predniSONE tablet 20 mg  (methylprednisolone taper panel)      -- Oral Daily 07/23/18 1326    07/28/18 0900  methylPREDNISolone sodium succinate injection 20 mg  (methylprednisolone taper panel)      07/29 0859 IV Every 12 hours 07/23/18 1326    07/27/18 0900  methylPREDNISolone sodium succinate injection 40 mg  (methylprednisolone taper panel)      07/28 0859 IV Every 12 hours 07/23/18 1326    07/26/18 0900  methylPREDNISolone sodium succinate injection 60 mg  (methylprednisolone taper panel)      07/27 0859 IV Every 12 hours 07/23/18 1326    07/25/18 0900  methylPREDNISolone sodium succinate injection 80 mg  (methylprednisolone taper panel)      07/26 0859 IV Every 12 hours 07/23/18 1326    07/24/18 0900  methylPREDNISolone sodium succinate injection 100 mg  (methylprednisolone taper panel)      07/25 0859 IV Every 12 hours 07/23/18 1326    07/23/18 1330  vasopressin (PITRESSIN) 0.2 Units/mL in dextrose 5 % 100 mL infusion      -- IV Continuous 07/23/18 1326        Pressors:    Autonomic Drugs     Start     Stop Route Frequency Ordered    07/23/18 1930  norepinephrine 4 mg in dextrose 5% 250 mL infusion (premix) (titrating)     Question Answer Comment   Titrate by: (in mcg/kg/min) 0.05    Titrate interval: (in minutes) 5    Titrate to maintain: (MAP or SBP) MAP    Greater than: (in mmHg) 90    Maximum dose: (in mcg/kg/min) 3        -- IV Continuous 07/23/18 1828        Hyperglycemia/Diabetes Medications: Antihyperglycemics     Start     Stop Route Frequency Ordered    07/24/18 1245  insulin regular (Humulin R) 100 Units in sodium chloride 0.9% 100 mL infusion      -- IV Continuous 07/24/18 1137    07/24/18 1236  insulin aspart U-100 pen 0-4 Units      -- SubQ As needed (PRN) 07/24/18 1137

## 2018-07-24 NOTE — PT/OT/SLP PROGRESS
Physical Therapy      Patient Name:  Alon Adamson   MRN:  [id number]    Patient not seen today secondary to  (pt on hold 2nd to being intubated, sedated and on CRRT. ). Will follow-up at a later date..    Nathalie Oliver, PT

## 2018-07-24 NOTE — PROGRESS NOTES
[facility]  Critical Care - Surgery  Progress Note    Patient Name: Alon Adamson  MRN: [id number]  Admission Date: 6/22/2018  Hospital Length of Stay: 32 days  Code Status: Full Code  Attending Provider: Williams Moon Jr., MD  Primary Care Provider: Mckinley Vides MD   Principal Problem: Acute kidney injury superimposed on chronic kidney disease    Subjective:     Hospital/ICU Course:  Per tranplant, Patient is listed for liver transplant, placed on internal hold 6/25 for HE then status 7 on 6/26 due to change in prescription coverage. He was re-activated on the list 7/3, MELD 22. Pt started on IV diuretics 6/22 and continued 6/23 and 6/24.  BLE edema and ascites sign if improved with diuresis and kidney function also improved. During admission, patient had worsening encephalopathy and asterixis prompting infectious work up, all blood and urine cultures obtained during admission with NGTD. Intermittently, encephalopathy severe enough to warrant lactulose enemas. CT head obtained and negative. Paracentesis 6/25 negative for peritonitis per cell count and U/A negative. Pt also with concern for BLE cellulitis- upper legs bright red with lower BLE still with dark appearance of venous stasis. Placed on vancomycin with improvement in BLE redness 6/26. Vanc continued for 7 days (ended 7/1). His HE waxed and waned for several days requiring lactulose enemas. Pt eating minimally during period of encephalopathy requiring gentle hydration with IVF 6/26. Of note, taco placement attempted 6/26 but unable secondary to agitation upon insertion prompting self resolving nose bleed. Micro lab called 6/26 PM with blood cx + for G+C in blood- pt continued on vanc which was started 6/25. ID consulted, suspect + blood cultures contaminant. EEG obtained 6/26 as pt with h/o serizures and negative. Pt continued on home Keppra. VICKY on admission initially improved with diuresis, but Cr sakina intermittently during  admission likely related to aggressive diuresis. Of note, with chest pain overnight 6/26-6/27 that resolved without intervention and pt reported as mild.  EKG NSR with PVC, troponin 0.1 in setting of hypervolemia + VICKY.  Normal dobutamine stress 1/2018.  Cardiology consulted and felt not ACS, no need for further workup other than continue to treat current risk factors for CAD with ASA (pt already on ASA and statin as outpt) + BB for portal hypertension which was started. Repeat paracentesis 7/10, 4.8L off, no SBP. Hypernatremia noted 7/9, nephrology consulted to aid in management. Hypernatremia attributed to dehydration from frequent BM's, diuretics were held, d/c'd creon due to excessive diarrhea, and patient was treated with increasing po free water and IVF. Echo 7/9 with normal EF, no wall abnormality, mild tricuspid/mitral regurg.     Overnight from 7/12 to 7/13, pt became hypothermic to 91.2 corrected with warming blankets, experienced worsening encephalopathy, hypotensive to 80/40.  Transferred to SICU.  Corrected with albumin 5% bolus.  Given additional lactulose.    7/14- no acute issues, ammonia level decreased, mentation improving    7/23-OLT    Interval History/Significant Events: NAEON, AFVSS.  Remains intubated and sedated.  On vasopressin 0.04.  Norepi weaned off.  Primary considering platelet transfusion for low platelets, no acute bleeds or bleeding around access sites noted.  However, some dark heme-looking output in OG tube.  AST/ALT improving as expected, INR elevated to 1.4.    Follow-up For: Procedure(s) (LRB):  TRANSPLANT, LIVER (N/A)    Post-Operative Day: 1 Day Post-Op    Objective:     Vital Signs (Most Recent):  Temp: 98.9 °F (37.2 °C) (07/24/18 0715)  Pulse: 79 (07/24/18 0730)  Resp: 16 (07/24/18 0730)  BP: (!) 132/59 (07/24/18 0658)  SpO2: 98 % (07/24/18 0730) Vital Signs (24h Range):  Temp:  [89.9 °F (32.2 °C)-99.1 °F (37.3 °C)] 98.9 °F (37.2 °C)  Pulse:  [63-97] 79  Resp:  [13-51]  16  SpO2:  [96 %-100 %] 98 %  BP: ()/(32-62) 132/59  Arterial Line BP: ()/(34-59) 100/45     Weight: 125.1 kg (275 lb 12.7 oz)  Body mass index is 35.41 kg/m².      Intake/Output Summary (Last 24 hours) at 07/24/18 0748  Last data filed at 07/24/18 0700   Gross per 24 hour   Intake            04776 ml   Output             3453 ml   Net            53807 ml       Physical Exam   Constitutional: He appears well-developed and well-nourished. No distress.   HENT:   Head: Normocephalic and atraumatic.   Eyes: Pupils are equal, round, and reactive to light.   Neck: Neck supple. No JVD present. No tracheal deviation present.   Cardiovascular: Normal rate, regular rhythm and normal heart sounds.    Pulmonary/Chest: Effort normal and breath sounds normal. No respiratory distress.   intubated   Abdominal: Soft. Bowel sounds are normal. He exhibits no distension. There is no tenderness. There is no guarding.   Incision in upper quadrants, Liver transplant surgery    Neurological:   Unconscious, intubated   Skin: He is not diaphoretic.   Vitals reviewed.      Vents:  Vent Mode: SIMV (07/24/18 0658)  Set Rate: 16 bmp (07/24/18 0658)  Vt Set: 500 mL (07/23/18 1446)  Pressure Support: 10 cmH20 (07/24/18 0658)  PEEP/CPAP: 5 cmH20 (07/24/18 0658)  Oxygen Concentration (%): 41 (07/24/18 0730)  Peak Airway Pressure: 14 cmH2O (07/24/18 0658)  Plateau Pressure: 0 cmH20 (07/24/18 0658)  Total Ve: 12.5 mL (07/24/18 0658)  F/VT Ratio<105 (RSBI): (!) 46.02 (07/24/18 0658)    Lines/Drains/Airways     Central Venous Catheter Line                 Introducer 07/23/18 0500 right internal jugular 1 day         Percutaneous Central Line Insertion/Assessment - double lumen  07/23/18 0500 right internal jugular 1 day         Percutaneous Central Line Insertion/Assessment - double lumen  07/23/18 0512 right femoral 1 day         Percutaneous Central Line Insertion/Assessment - triple lumen  07/23/18 0530 right internal jugular 1 day          Pulmonary Artery Catheter Assessment  07/23/18 0530 1 day          Drain                 Urethral Catheter 07/21/18 1706 2 days         Closed/Suction Drain 07/23/18 Right;Lateral Abdomen Bulb 1 day         Closed/Suction Drain 07/23/18 Right;Medial Abdomen Bulb 1 day         NG/OG Tube 07/23/18 orogastric 1 day          Airway                 Airway - Non-Surgical 07/23/18 0451 Endotracheal Tube 1 day          Arterial Line                 Arterial Line 07/23/18 0458 Left Radial 1 day         Arterial Line 07/23/18 0500 Right Femoral 1 day          Peripheral Intravenous Line                 Midline Catheter Insertion/Assessment  - Single Lumen 07/20/18 1128 Right cephalic vein (lateral side of arm) 18g x 10cm 3 days         Peripheral IV - Single Lumen 07/23/18 0511 Left Antecubital 1 day                Significant Labs:    CBC/Anemia Profile:    Recent Labs  Lab 07/23/18  1847 07/23/18  2300 07/24/18  0300   WBC 9.50 8.38 6.77   HGB 9.3* 9.4* 10.1*   HCT 29.3* 28.5* 30.6*   PLT 31* 19* 20*   * 97 97   RDW 19.5* 19.3* 19.1*        Chemistries:    Recent Labs  Lab 07/23/18  1100 07/23/18  1210 07/23/18  1328  07/23/18  1847 07/23/18  2300 07/24/18  0300    143 143  --  142 141 142  142   K 4.0 4.0 4.2  --  4.2 4.3 4.2  4.2   CL  --   --  111*  --  109 109 110  110   CO2  --   --  18*  --  16* 20* 23  23   BUN  --   --  28*  --  29* 30* 34*  34*   CREATININE  --   --  2.0*  --  2.2* 2.2* 2.4*  2.4*   CALCIUM  --   --  8.1*  --  8.0* 7.8* 8.0*  8.0*   ALBUMIN 1.5* 1.8* 1.9*  --  2.6*  --  2.7*  2.7*   PROT  --   --  3.0*  --  3.7*  --  4.0*   BILITOT  --   --  3.0*  --  4.3*  --  2.9*   ALKPHOS  --   --  52*  --  48*  --  55   ALT  --   --  173*  --  159*  --  152*   AST  --   --  346*  < > 304* 255* 220*  220*   MG 2.4 2.2  --   --   --   --  2.4   PHOS  --   --   --   --   --   --  5.1*  5.1*   < > = values in this interval not displayed.    All pertinent labs within the past 24 hours have  been reviewed.    Significant Imaging:  I have reviewed all pertinent imaging results/findings within the past 24 hours.    Assessment/Plan:     Hepatic encephalopathy    63 yr old male with decompensated alcoholic cirrhosis, CKD III and CAD. Hospitalized with HE, VICKY on CKD. S/p OLT on 7/23 with intraop CRRT. Brought to the ICU intubated, not requiring pressor support. Significant blood loss, with aggressive resuscitation intraop    Neuro  -hx seizures and HE  -keppra  -Sedation on propofol/fentanyl, daily sedation vacation.    Cardiovascular  - h/o CAD  - 2D Echo 7/9 normal EF (55-60%), trivial tricuspid/mitral regurg  - Not requiring pressor support    Respiratory:  -Intubated, minimal vent settings  -monitor daily ABG  -daily SBT, wean to extubate    Renal:  -VICKY on CKD-intraop CRRT, anuric  -Nephrology following  -[place], BUN/Cr    Liver and Pancreas  -s/p OLT 7/23   -Trend liver enzymes    GI  -NPO  -Replete lytes prn       ID  -Anti-rejection, antiretrovirals  -monitor WBC    Heme  -h/h stable, thrombocytopenia. 14 L blood loss intraop.   RBC (Units) 9   FFP (Units) 2   Cryoprecipitate (Units)  2   Platelets (Units) 2   -consider PLT administration for low PLT, no bleeding noted  -some heme output in OG tube, continue to monitor CBC and output    Dispo  -Primary team Transplant Surgery  -Wean to extubate  -Wean CRRT to HD  -Continue SICU care           Critical care was time spent personally by me on the following activities: development of treatment plan with patient or surrogate and bedside caregivers, discussions with consultants, evaluation of patient's response to treatment, examination of patient, ordering and performing treatments and interventions, ordering and review of laboratory studies, ordering and review of radiographic studies, pulse oximetry, re-evaluation of patient's condition.  This critical care time did not overlap with that of any other provider or involve time for any procedures.      Ernesto Fraire MD  Critical Care - Surgery  [facility]

## 2018-07-24 NOTE — ASSESSMENT & PLAN NOTE
BG goal 140-180    Previously on IIP.  Stepped off drip.      Recommend  Transition IV 1.3u/h  Patient NPO, but once diet advanced will order scheduled.  Continue low dose correction    Discharge:  TBD.

## 2018-07-24 NOTE — ASSESSMENT & PLAN NOTE
- continue to monitor serial RFPs and strict Is & Os  - Avoid nephrotoxic medications  - Maintain MAP >65  - Hb > 7gm/dL  - ABGs, U/A, Labs in AM   - Patient still has low side bp reading, hold CRRT for now and will monitor patient to evaluate need to start CRRT anytime later today or tomorrow.

## 2018-07-24 NOTE — ASSESSMENT & PLAN NOTE
63-year-old male  - EtOH cirrhosis s/p OLT 7/23/2018, CMV D-/R-, steroid induction, on tacro/MMF/pred  - Pre-transplant course c/b hypothermia  - Donor with E. Coli becteremia resistant to cipro     Recommendations:  - Complete 14 day course of ceftriaxone 2g IV qdaily (day 1 is 7/23/2018)

## 2018-07-24 NOTE — PLAN OF CARE
Problem: Patient Care Overview  Goal: Plan of Care Review  Outcome: Ongoing (interventions implemented as appropriate)  Pt remains intubated, on SIMV 40%, 5 of PEEP. Pt moving all extremities, not following commands, pt becomes restless/agitated when spoken to. Vaso and levo weaned off. Pt remains on insulin @ 1.3 U/hr and D51/2 NS @ 50 mL/hr. CRRT held today. ABGs done Q4H. CHAS drain 1 output changed from serosanguinous to sanguinous @ 11, Dr. Huff notified. CHAS 1 output 60-90 mL/hr. CHAS 2 serous, ~10 mL/hr. Last platelet count 12, Dr. Huff aware. OG output blood tinged. . UO ~ 20 mL throughout entire shift. CVPs 6-3. 1 medium brown BM this shift. Pt and family updated on plan of care throughout shift. See flow sheet for assessment and details.

## 2018-07-24 NOTE — PROGRESS NOTES
"[facility]  Endocrinology  Progress Note    Admit Date: 6/22/2018     64 yo male with diagnosis of ESLD secondary to EtOH cirrhosis with severe complications (hyperbilirubinemia, hypoalbuminemia, severe malnutrition, hepatic encephalopathy, thrombocytopenia, splenomegaly, ascites, hypervolemia, coaguloopathy, portal HTN), seizure disorder on Keppra, CKD, CAD, is currently being treated for decompensated liver disease. Hospital course was complicated by hepatic encephalopathy. During the course, he has also had few episodes of hypothermia with temp as low as 93 F. He completed 7 day course of vancomycin for cellilitis, and is currently on vancomycin and cefepime for possible sepsis. However he continues to be hypothermic.   Further work-up of hypothermia was done, which showed TSH: 2.714, FT4: 0.53, AM cortisol: 9.9.  Normal adrenal function workup.    Endocrinology re-consulted for hyperglycemia, s/p transplant on high dosed steroids.      Interval HPI:   Overnight events:  Patient placed on IIP overnight during and after surgery.  Did well.  Currently held initially for BG in 90s, since BG trending upward 160s.    Diet - NPO  Afebrile no hypoglycemia    /60 (BP Location: Left arm, Patient Position: Lying)   Pulse 67   Temp 98.4 °F (36.9 °C) (Core ([place]))   Resp 16   Ht 6' 2" (1.88 m)   Wt 125.1 kg (275 lb 12.7 oz)   SpO2 100%   BMI 35.41 kg/m²       Labs Reviewed and Include      Recent Labs  Lab 07/24/18  0300 07/24/18  0703   *  212* 99   CALCIUM 8.0*  8.0* 7.7*   ALBUMIN 2.7*  2.7*  --    PROT 4.0*  --      142 138   K 4.2  4.2 4.2   CO2 23  23 23     110 107   BUN 34*  34* 36*   CREATININE 2.4*  2.4* 2.7*   ALKPHOS 55  --    *  --    *  220* 169*   BILITOT 2.9*  --      Lab Results   Component Value Date    WBC 6.64 07/24/2018    HGB 9.7 (L) 07/24/2018    HCT 29.1 (L) 07/24/2018    MCV 98 07/24/2018    PLT 18 (LL) 07/24/2018 "       Recent Labs  Lab 07/20/18  0639   TSH 2.714   FREET4 0.53*     Lab Results   Component Value Date    HGBA1C 5.2 07/22/2018       Nutritional status:   Body mass index is 35.41 kg/m².  Lab Results   Component Value Date    ALBUMIN 2.7 (L) 07/24/2018    ALBUMIN 2.7 (L) 07/24/2018    ALBUMIN 2.6 (L) 07/23/2018     Lab Results   Component Value Date    PREALBUMIN 4 (L) 06/30/2018    PREALBUMIN 5 (L) 01/22/2018    PREALBUMIN 7 (L) 08/31/2016       Estimated Creatinine Clearance: 39.9 mL/min (A) (based on SCr of 2.7 mg/dL (H)).    Accu-Checks  Recent Labs      07/24/18   0204  07/24/18   0302  07/24/18   0348  07/24/18   0511  07/24/18   0602  07/24/18   0731  07/24/18   0829  07/24/18   0901  07/24/18   1008  07/24/18   1116   POCTGLUCOSE  232*  196*  189*  125*  112*  94  116*  128*  162*  185*       Current Medications and/or Treatments Impacting Glycemic Control  Immunotherapy:  Immunosuppressants         Stop Route Frequency     tacrolimus (PROGRAF) 1 mg/mL oral syringe      -- Oral 2 times daily     mycophenolate mofetil 200 mg/mL suspension 1,000 mg      -- Oral 2 times daily        Steroids:   Hormones     Start     Stop Route Frequency Ordered    07/29/18 0900  predniSONE tablet 20 mg  (methylprednisolone taper panel)      -- Oral Daily 07/23/18 1326    07/28/18 0900  methylPREDNISolone sodium succinate injection 20 mg  (methylprednisolone taper panel)      07/29 0859 IV Every 12 hours 07/23/18 1326    07/27/18 0900  methylPREDNISolone sodium succinate injection 40 mg  (methylprednisolone taper panel)      07/28 0859 IV Every 12 hours 07/23/18 1326    07/26/18 0900  methylPREDNISolone sodium succinate injection 60 mg  (methylprednisolone taper panel)      07/27 0859 IV Every 12 hours 07/23/18 1326    07/25/18 0900  methylPREDNISolone sodium succinate injection 80 mg  (methylprednisolone taper panel)      07/26 0859 IV Every 12 hours 07/23/18 1326    07/24/18 0900  methylPREDNISolone sodium succinate  injection 100 mg  (methylprednisolone taper panel)      07/25 0859 IV Every 12 hours 07/23/18 1326    07/23/18 1330  vasopressin (PITRESSIN) 0.2 Units/mL in dextrose 5 % 100 mL infusion      -- IV Continuous 07/23/18 1326        Pressors:    Autonomic Drugs     Start     Stop Route Frequency Ordered    07/23/18 1930  norepinephrine 4 mg in dextrose 5% 250 mL infusion (premix) (titrating)     Question Answer Comment   Titrate by: (in mcg/kg/min) 0.05    Titrate interval: (in minutes) 5    Titrate to maintain: (MAP or SBP) MAP    Greater than: (in mmHg) 90    Maximum dose: (in mcg/kg/min) 3        -- IV Continuous 07/23/18 1828        Hyperglycemia/Diabetes Medications: Antihyperglycemics     Start     Stop Route Frequency Ordered    07/24/18 1245  insulin regular (Humulin R) 100 Units in sodium chloride 0.9% 100 mL infusion      -- IV Continuous 07/24/18 1137    07/24/18 1236  insulin aspart U-100 pen 0-4 Units      -- SubQ As needed (PRN) 07/24/18 1137          ASSESSMENT and PLAN    * Acute kidney injury superimposed on chronic kidney disease              Steroid-induced hyperglycemia    BG goal 140-180    Previously on IIP.  Stepped off drip.      Recommend  Transition IV 1.3u/h  Patient NPO, but once diet advanced will order scheduled.  Continue low dose correction    Discharge:  TBD.          Liver transplanted      S/p transplanted.  On high dose steroids and immunosuppressants  Per transplant          Coronary artery disease involving native coronary artery of native heart without angina pectoris      Avoid hypoglycemia            Alesia Telles MD  Endocrinology  [facility]

## 2018-07-24 NOTE — PLAN OF CARE
Problem: Spiritual Distress, Risk/Actual (Adult,Obstetrics,Pediatric)  Intervention: Facilitate Personal Exploration/Expression of Spirituality  Provided bible per  recommendation that hospital provide once since family's bible was lost. Also introduced and offered pastoral support. Pt's wife, son, and another family member present. No spiritual needs expressed at this time. Family made aware of 's presence as needed.

## 2018-07-24 NOTE — PROGRESS NOTES
Transplant Note:    Patient received transplant on 7/23/18. SW presented to patient's room. Patient's mother, wife, and child were present. SW spoke with patient's caregiver/wife, Vin. Caregiver presented as AAOx4 and engaged. Patient was observed lying in bed and intubated.  Caregiver reported being more hopeful since the patient has been transplanted. Caregiver reported that she may return to work for a few days once the patient is extubated. Caregiver reported that she understands that she will be required to present for education when the patient returns to [facility]. Caregiver was provided a [employer] financial profile to complete and return to . The caregiver reported that she was coping adequately with the support of family. Caregiver denies any current mental health difficulties. SW will continue to follow patient for resources, education, support and dc planning as appropriate. SW remains available.

## 2018-07-24 NOTE — SUBJECTIVE & OBJECTIVE
Interval History/Significant Events: NAEON, AFVSS.  Remains intubated and sedated.  On vasopressin 0.04.  Norepi weaned off.  Primary considering platelet transfusion for low platelets, no acute bleeds or bleeding around access sites noted.  However, some dark heme-looking output in OG tube.  AST/ALT improving as expected, INR elevated to 1.4.    Follow-up For: Procedure(s) (LRB):  TRANSPLANT, LIVER (N/A)    Post-Operative Day: 1 Day Post-Op    Objective:     Vital Signs (Most Recent):  Temp: 98.9 °F (37.2 °C) (07/24/18 0715)  Pulse: 79 (07/24/18 0730)  Resp: 16 (07/24/18 0730)  BP: (!) 132/59 (07/24/18 0658)  SpO2: 98 % (07/24/18 0730) Vital Signs (24h Range):  Temp:  [89.9 °F (32.2 °C)-99.1 °F (37.3 °C)] 98.9 °F (37.2 °C)  Pulse:  [63-97] 79  Resp:  [13-51] 16  SpO2:  [96 %-100 %] 98 %  BP: ()/(32-62) 132/59  Arterial Line BP: ()/(34-59) 100/45     Weight: 125.1 kg (275 lb 12.7 oz)  Body mass index is 35.41 kg/m².      Intake/Output Summary (Last 24 hours) at 07/24/18 0748  Last data filed at 07/24/18 0700   Gross per 24 hour   Intake            42213 ml   Output             3453 ml   Net            57480 ml       Physical Exam   Constitutional: He appears well-developed and well-nourished. No distress.   HENT:   Head: Normocephalic and atraumatic.   Eyes: Pupils are equal, round, and reactive to light.   Neck: Neck supple. No JVD present. No tracheal deviation present.   Cardiovascular: Normal rate, regular rhythm and normal heart sounds.    Pulmonary/Chest: Effort normal and breath sounds normal. No respiratory distress.   intubated   Abdominal: Soft. Bowel sounds are normal. He exhibits no distension. There is no tenderness. There is no guarding.   Incision in upper quadrants, Liver transplant surgery    Neurological:   Unconscious, intubated   Skin: He is not diaphoretic.   Vitals reviewed.      Vents:  Vent Mode: SIMV (07/24/18 0658)  Set Rate: 16 bmp (07/24/18 0658)  Vt Set: 500 mL (07/23/18  1446)  Pressure Support: 10 cmH20 (07/24/18 0658)  PEEP/CPAP: 5 cmH20 (07/24/18 0658)  Oxygen Concentration (%): 41 (07/24/18 0730)  Peak Airway Pressure: 14 cmH2O (07/24/18 0658)  Plateau Pressure: 0 cmH20 (07/24/18 0658)  Total Ve: 12.5 mL (07/24/18 0658)  F/VT Ratio<105 (RSBI): (!) 46.02 (07/24/18 0658)    Lines/Drains/Airways     Central Venous Catheter Line                 Introducer 07/23/18 0500 right internal jugular 1 day         Percutaneous Central Line Insertion/Assessment - double lumen  07/23/18 0500 right internal jugular 1 day         Percutaneous Central Line Insertion/Assessment - double lumen  07/23/18 0512 right femoral 1 day         Percutaneous Central Line Insertion/Assessment - triple lumen  07/23/18 0530 right internal jugular 1 day         Pulmonary Artery Catheter Assessment  07/23/18 0530 1 day          Drain                 Urethral Catheter 07/21/18 1706 2 days         Closed/Suction Drain 07/23/18 Right;Lateral Abdomen Bulb 1 day         Closed/Suction Drain 07/23/18 Right;Medial Abdomen Bulb 1 day         NG/OG Tube 07/23/18 orogastric 1 day          Airway                 Airway - Non-Surgical 07/23/18 0451 Endotracheal Tube 1 day          Arterial Line                 Arterial Line 07/23/18 0458 Left Radial 1 day         Arterial Line 07/23/18 0500 Right Femoral 1 day          Peripheral Intravenous Line                 Midline Catheter Insertion/Assessment  - Single Lumen 07/20/18 1128 Right cephalic vein (lateral side of arm) 18g x 10cm 3 days         Peripheral IV - Single Lumen 07/23/18 0511 Left Antecubital 1 day                Significant Labs:    CBC/Anemia Profile:    Recent Labs  Lab 07/23/18  1847 07/23/18  2300 07/24/18  0300   WBC 9.50 8.38 6.77   HGB 9.3* 9.4* 10.1*   HCT 29.3* 28.5* 30.6*   PLT 31* 19* 20*   * 97 97   RDW 19.5* 19.3* 19.1*        Chemistries:    Recent Labs  Lab 07/23/18  1100 07/23/18  1210 07/23/18  1328  07/23/18  1847 07/23/18  5134  07/24/18  0300    143 143  --  142 141 142  142   K 4.0 4.0 4.2  --  4.2 4.3 4.2  4.2   CL  --   --  111*  --  109 109 110  110   CO2  --   --  18*  --  16* 20* 23  23   BUN  --   --  28*  --  29* 30* 34*  34*   CREATININE  --   --  2.0*  --  2.2* 2.2* 2.4*  2.4*   CALCIUM  --   --  8.1*  --  8.0* 7.8* 8.0*  8.0*   ALBUMIN 1.5* 1.8* 1.9*  --  2.6*  --  2.7*  2.7*   PROT  --   --  3.0*  --  3.7*  --  4.0*   BILITOT  --   --  3.0*  --  4.3*  --  2.9*   ALKPHOS  --   --  52*  --  48*  --  55   ALT  --   --  173*  --  159*  --  152*   AST  --   --  346*  < > 304* 255* 220*  220*   MG 2.4 2.2  --   --   --   --  2.4   PHOS  --   --   --   --   --   --  5.1*  5.1*   < > = values in this interval not displayed.    All pertinent labs within the past 24 hours have been reviewed.    Significant Imaging:  I have reviewed all pertinent imaging results/findings within the past 24 hours.

## 2018-07-24 NOTE — SUBJECTIVE & OBJECTIVE
Interval History:  Admitted to the ICU after a liver transplant, intubated and on minimal pressor support. He received intra op dialysis and was transfused 9u pRBC with 4.5L cell saver with a 14L EBL. He has remained HDS and on minimal sedation with 0.2 dilaudid pushes. He is on minimal vent settings, but has not been awake enough to follow commands. He did not tolerated CRRT overnight as he became hypotensive and required additional pressor support. His liver US was unremarkable and his graft function continues to show improvement.    Scheduled Meds:   ascorbic acid (vitamin C)  1,000 mg Oral Daily    atorvastatin  40 mg Oral Daily    cefTRIAXone (ROCEPHIN) IVPB  2 g Intravenous Q24H    docusate sodium  100 mg Oral Daily    ergocalciferol  50,000 Units Oral Q7 Days    fluconazole  200 mg Oral Daily    heparin (porcine)  5,000 Units Subcutaneous Q8H    levetiracetam IVPB  500 mg Intravenous Q12H    lidocaine  2 patch Transdermal Q24H    methylPREDNISolone sodium succinate  100 mg Intravenous Q12H    Followed by    [START ON 7/25/2018] methylPREDNISolone sodium succinate  80 mg Intravenous Q12H    Followed by    [START ON 7/26/2018] methylPREDNISolone sodium succinate  60 mg Intravenous Q12H    Followed by    [START ON 7/27/2018] methylPREDNISolone sodium succinate  40 mg Intravenous Q12H    Followed by    [START ON 7/28/2018] methylPREDNISolone sodium succinate  20 mg Intravenous Q12H    Followed by    [START ON 7/29/2018] predniSONE  20 mg Oral Daily    mycophenolate mofetil  1,000 mg Oral BID    nystatin  500,000 Units Oral QID    omega-3 acid ethyl esters  2 g Oral Daily with breakfast    pantoprazole 40 mg in dextrose 5 % 100 mL IVPB (over 15 minutes) (ready to mix system)  40 mg Intravenous BID    sertraline  50 mg Oral Daily    sulfamethoxazole-trimethoprim 400-80mg  1 tablet Oral Daily    tacrolimus  1 mg Oral BID    triamcinolone acetonide 0.1%   Topical (Top) BID    valganciclovir 50  mg/ml  450 mg Per NG tube Daily    zinc sulfate  220 mg Oral BID     Continuous Infusions:   sodium chloride 0.9%      dextrose 5 % and 0.45 % NaCl 50 mL/hr at 07/24/18 1100    insulin (HUMAN R) infusion (adults) Stopped (07/24/18 0700)    norepinephrine bitartrate-D5W Stopped (07/24/18 0900)    vasopressin (PITRESSIN) infusion 0.04 Units/min (07/24/18 1100)     PRN Meds:sodium chloride, sodium chloride, acetaminophen, dextrose 50%, dextrose 50%, dextrose 50%, diphenhydrAMINE-zinc acetate 1-0.1%, glucagon (human recombinant), HYDROmorphone, lactulose, magnesium sulfate IVPB, metoclopramide HCl, ondansetron, sodium chloride 0.9%, sodium phosphate IVPB, sodium phosphate IVPB, sodium phosphate IVPB    Review of Systems   Unable to perform ROS: Intubated     Objective:     Vital Signs (Most Recent):  Temp: 98.4 °F (36.9 °C) (07/24/18 1100)  Pulse: 67 (07/24/18 1115)  Resp: 16 (07/24/18 1115)  BP: 138/60 (07/24/18 1100)  SpO2: 100 % (07/24/18 1115) Vital Signs (24h Range):  Temp:  [89.9 °F (32.2 °C)-99.1 °F (37.3 °C)] 98.4 °F (36.9 °C)  Pulse:  [63-94] 67  Resp:  [13-51] 16  SpO2:  [96 %-100 %] 100 %  BP: ()/(41-62) 138/60  Arterial Line BP: ()/(34-70) 133/53     Weight: 125.1 kg (275 lb 12.7 oz)  Body mass index is 35.41 kg/m².    Intake/Output - Last 3 Shifts       07/22 0700 - 07/23 0659 07/23 0700 - 07/24 0659 07/24 0700 - 07/25 0659    P.O. 1226      I.V. (mL/kg) 1300 (10.3) 32539 (128.4)     Blood 985 2427     Other  400     NG/GT  25 300    Total Intake(mL/kg) 3511 (27.8) 45563 (151.2) 300 (2.4)    Urine (mL/kg/hr) 435 (0.1) 312 (0.1) 5 (0)    Drains  7235 (2.4) 314 (0.6)    Other  451 (0.2)     Total Output 435 7998 319    Net +3076 +95702 -19           Stool Occurrence 8 x            Physical Exam   Constitutional: He appears well-developed and well-nourished.   HENT:   Head: Normocephalic and atraumatic.   Eyes: No scleral icterus.   Cardiovascular: Normal rate and regular rhythm.    No murmur  heard.  Abdominal: Soft. He exhibits no distension.   Musculoskeletal: He exhibits edema.   Lymphadenopathy:     He has no cervical adenopathy.   Skin: Skin is warm and dry. He is not diaphoretic. There is erythema.   Dry flaking skin throughout       Laboratory:  Immunosuppressants         Stop Route Frequency     tacrolimus (PROGRAF) 1 mg/mL oral syringe      -- Oral 2 times daily     mycophenolate mofetil 200 mg/mL suspension 1,000 mg      -- Oral 2 times daily        CBC:   Recent Labs  Lab 07/24/18  0703   WBC 6.64   RBC 2.97*   HGB 9.7*   HCT 29.1*   PLT 18*   MCV 98   MCH 32.7*   MCHC 33.3     CMP:   Recent Labs  Lab 07/24/18  0300 07/24/18  0703   *  212* 99   CALCIUM 8.0*  8.0* 7.7*   ALBUMIN 2.7*  2.7*  --    PROT 4.0*  --      142 138   K 4.2  4.2 4.2   CO2 23  23 23     110 107   BUN 34*  34* 36*   CREATININE 2.4*  2.4* 2.7*   ALKPHOS 55  --    *  --    *  220* 169*   BILITOT 2.9*  --      Coagulation:   Recent Labs  Lab 07/24/18  0300 07/24/18  0703   INR 1.4* 1.3*   APTT 36.1*  --      ABGs:   Recent Labs  Lab 07/24/18  0903   PH 7.367   PCO2 39.8   HCO3 22.8*   POCSATURATED 100   BE -3     Labs within the past 24 hours have been reviewed.    Diagnostic Results:  US Liver Transplant Post:  No results found for this or any previous visit.  I have personally reviewed all pertinent imaging studies.

## 2018-07-24 NOTE — ASSESSMENT & PLAN NOTE
Neuro:   - minimal sedation  - difficulty with eye opening and not following commands  - continue to monitor throughout the day and extubate once alert     Pulmonary:   - 16/50%/5 drawing tidal volumes of 450-600  - 7.35/82/40.8/24 LA 0.94  - plan to extubate once neuro status improves     Cardiac:  - HDS  - vaso 0.04  - CVP 3-6     Renal:   - BUN/Cr 34/2.4 up from Cr 2 post op  - UOP minimal ~10cc overnight  - follow up nephrology recs for dialysis options     Infectious Disease:   - WBC 6.7  - rocephin for donor positive cultures  - Trend WBC     Hematology/Oncology:  - H/H 10.1/30.6  - Trend CBC     Endocrine:  - insulin gtt 0.9  - monitor     Fluids/Electrolytes/Nutrition/GI:   - NPO  - will plan for enteral feeds if still intubated tomorrow  - Trend CMP  - Replace Lytes PRN     Pain Management::   - dilaudid 0.2 mg prn, hold while intubated     Dispo:  Continue SICU care

## 2018-07-24 NOTE — ASSESSMENT & PLAN NOTE
63 yr old male with decompensated alcoholic cirrhosis, CKD III and CAD. Hospitalized with HE, VICKY on CKD. S/p OLT on 7/23 with intraop CRRT. Brought to the ICU intubated, not requiring pressor support. Significant blood loss, with aggressive resuscitation intraop    Neuro  -hx seizures and HE  -keppra  -Sedation on propofol/fentanyl, daily sedation vacation.    Cardiovascular  - h/o CAD  - 2D Echo 7/9 normal EF (55-60%), trivial tricuspid/mitral regurg  - Not requiring pressor support    Respiratory:  -Intubated, minimal vent settings  -monitor daily ABG  -daily SBT, wean to extubate    Renal:  -VICKY on CKD-intraop CRRT, anuric  -Nephrology following  -[place], BUN/Cr    Liver and Pancreas  -s/p OLT 7/23   -Trend liver enzymes    GI  -NPO  -Replete lytes prn       ID  -Anti-rejection, antiretrovirals  -monitor WBC    Heme  -h/h stable, thrombocytopenia. 14 L blood loss intraop.   RBC (Units) 9   FFP (Units) 2   Cryoprecipitate (Units)  2   Platelets (Units) 2   -consider PLT administration for low PLT, no bleeding noted  -some heme output in OG tube, continue to monitor CBC and output    Dispo  -Primary team Transplant Surgery  -Wean to extubate  -Wean CRRT to HD  -Continue SICU care

## 2018-07-25 LAB
ALBUMIN SERPL BCP-MCNC: 2.2 G/DL
ALBUMIN SERPL BCP-MCNC: 2.2 G/DL
ALBUMIN SERPL BCP-MCNC: 2.4 G/DL
ALLENS TEST: ABNORMAL
ALLENS TEST: ABNORMAL
ALP SERPL-CCNC: 72 U/L
ALT SERPL W/O P-5'-P-CCNC: 91 U/L
ANION GAP SERPL CALC-SCNC: 10 MMOL/L
ANION GAP SERPL CALC-SCNC: 13 MMOL/L
ANION GAP SERPL CALC-SCNC: 13 MMOL/L
ANION GAP SERPL CALC-SCNC: 9 MMOL/L
ANISOCYTOSIS BLD QL SMEAR: SLIGHT
APTT BLDCRRT: 30.3 SEC
AST SERPL-CCNC: 77 U/L
BASOPHILS # BLD AUTO: 0 K/UL
BASOPHILS NFR BLD: 0 %
BILIRUB DIRECT SERPL-MCNC: 1.1 MG/DL
BILIRUB SERPL-MCNC: 1.5 MG/DL
BLD PROD TYP BPU: NORMAL
BLOOD UNIT EXPIRATION DATE: NORMAL
BLOOD UNIT TYPE CODE: 9500
BLOOD UNIT TYPE: NORMAL
BUN SERPL-MCNC: 52 MG/DL
BUN SERPL-MCNC: 59 MG/DL
BUN SERPL-MCNC: 68 MG/DL
BUN SERPL-MCNC: 68 MG/DL
CALCIUM SERPL-MCNC: 7.3 MG/DL
CALCIUM SERPL-MCNC: 7.3 MG/DL
CALCIUM SERPL-MCNC: 7.4 MG/DL
CALCIUM SERPL-MCNC: 7.5 MG/DL
CHLORIDE SERPL-SCNC: 106 MMOL/L
CHLORIDE SERPL-SCNC: 108 MMOL/L
CO2 SERPL-SCNC: 18 MMOL/L
CO2 SERPL-SCNC: 18 MMOL/L
CO2 SERPL-SCNC: 21 MMOL/L
CO2 SERPL-SCNC: 23 MMOL/L
CODING SYSTEM: NORMAL
CREAT SERPL-MCNC: 3.2 MG/DL
CREAT SERPL-MCNC: 3.7 MG/DL
CREAT SERPL-MCNC: 3.8 MG/DL
CREAT SERPL-MCNC: 3.8 MG/DL
DELSYS: ABNORMAL
DELSYS: ABNORMAL
DIFFERENTIAL METHOD: ABNORMAL
DISPENSE STATUS: NORMAL
EOSINOPHIL # BLD AUTO: 0 K/UL
EOSINOPHIL NFR BLD: 0 %
ERYTHROCYTE [DISTWIDTH] IN BLOOD BY AUTOMATED COUNT: 18.9 %
EST. GFR  (AFRICAN AMERICAN): 18.5 ML/MIN/1.73 M^2
EST. GFR  (AFRICAN AMERICAN): 18.5 ML/MIN/1.73 M^2
EST. GFR  (AFRICAN AMERICAN): 19.1 ML/MIN/1.73 M^2
EST. GFR  (AFRICAN AMERICAN): 22.8 ML/MIN/1.73 M^2
EST. GFR  (NON AFRICAN AMERICAN): 16 ML/MIN/1.73 M^2
EST. GFR  (NON AFRICAN AMERICAN): 16 ML/MIN/1.73 M^2
EST. GFR  (NON AFRICAN AMERICAN): 16.5 ML/MIN/1.73 M^2
EST. GFR  (NON AFRICAN AMERICAN): 19.7 ML/MIN/1.73 M^2
FIO2: 40
GGT SERPL-CCNC: 50 U/L
GLUCOSE SERPL-MCNC: 164 MG/DL
GLUCOSE SERPL-MCNC: 221 MG/DL
GLUCOSE SERPL-MCNC: 221 MG/DL
GLUCOSE SERPL-MCNC: 261 MG/DL
HCO3 UR-SCNC: 21.9 MMOL/L (ref 24–28)
HCO3 UR-SCNC: 22.3 MMOL/L (ref 24–28)
HCT VFR BLD AUTO: 24.1 %
HGB BLD-MCNC: 8 G/DL
HYPOCHROMIA BLD QL SMEAR: ABNORMAL
IMM GRANULOCYTES # BLD AUTO: 0.01 K/UL
IMM GRANULOCYTES NFR BLD AUTO: 0.4 %
INR PPP: 1.2
LACTATE SERPL-SCNC: 0.7 MMOL/L
LYMPHOCYTES # BLD AUTO: 0.3 K/UL
LYMPHOCYTES NFR BLD: 12.4 %
MAGNESIUM SERPL-MCNC: 2.4 MG/DL
MAGNESIUM SERPL-MCNC: 2.4 MG/DL
MAGNESIUM SERPL-MCNC: 2.5 MG/DL
MAGNESIUM SERPL-MCNC: 2.6 MG/DL
MCH RBC QN AUTO: 32.7 PG
MCHC RBC AUTO-ENTMCNC: 33.2 G/DL
MCV RBC AUTO: 98 FL
MIN VOL: 6.7
MODE: ABNORMAL
MONOCYTES # BLD AUTO: 0.3 K/UL
MONOCYTES NFR BLD: 10.4 %
NEUTROPHILS # BLD AUTO: 1.9 K/UL
NEUTROPHILS NFR BLD: 76.8 %
NRBC BLD-RTO: 0 /100 WBC
OVALOCYTES BLD QL SMEAR: ABNORMAL
PCO2 BLDA: 37.2 MMHG (ref 35–45)
PCO2 BLDA: 38.2 MMHG (ref 35–45)
PEEP: 5
PH SMN: 7.37 [PH] (ref 7.35–7.45)
PH SMN: 7.38 [PH] (ref 7.35–7.45)
PHOSPHATE SERPL-MCNC: 6.2 MG/DL
PHOSPHATE SERPL-MCNC: 6.2 MG/DL
PHOSPHATE SERPL-MCNC: 7.3 MG/DL
PHOSPHATE SERPL-MCNC: 7.3 MG/DL
PHOSPHATE SERPL-MCNC: 7.8 MG/DL
PLATELET # BLD AUTO: 21 K/UL
PMV BLD AUTO: 13 FL
PO2 BLDA: 153 MMHG (ref 80–100)
PO2 BLDA: 183 MMHG (ref 80–100)
POC BE: -3 MMOL/L
POC BE: -3 MMOL/L
POC SATURATED O2: 100 % (ref 95–100)
POC SATURATED O2: 99 % (ref 95–100)
POC TCO2: 23 MMOL/L (ref 23–27)
POC TCO2: 23 MMOL/L (ref 23–27)
POCT GLUCOSE: 166 MG/DL (ref 70–110)
POCT GLUCOSE: 205 MG/DL (ref 70–110)
POCT GLUCOSE: 219 MG/DL (ref 70–110)
POCT GLUCOSE: 237 MG/DL (ref 70–110)
POCT GLUCOSE: 250 MG/DL (ref 70–110)
POCT GLUCOSE: 256 MG/DL (ref 70–110)
POIKILOCYTOSIS BLD QL SMEAR: SLIGHT
POLYCHROMASIA BLD QL SMEAR: ABNORMAL
POTASSIUM SERPL-SCNC: 4.6 MMOL/L
POTASSIUM SERPL-SCNC: 5.1 MMOL/L
PROT SERPL-MCNC: 3.6 G/DL
PROTHROMBIN TIME: 12.5 SEC
PS: 10
RBC # BLD AUTO: 2.45 M/UL
SAMPLE: ABNORMAL
SAMPLE: ABNORMAL
SITE: ABNORMAL
SITE: ABNORMAL
SODIUM SERPL-SCNC: 137 MMOL/L
SODIUM SERPL-SCNC: 137 MMOL/L
SODIUM SERPL-SCNC: 138 MMOL/L
SODIUM SERPL-SCNC: 139 MMOL/L
SP02: 98
SPHEROCYTES BLD QL SMEAR: ABNORMAL
SPONT RATE: 10
TACROLIMUS BLD-MCNC: 6.5 NG/ML
TRANS PLATPHERESIS VOL PATIENT: NORMAL ML
WBC # BLD AUTO: 2.51 K/UL

## 2018-07-25 PROCEDURE — 83735 ASSAY OF MAGNESIUM: CPT | Mod: 91

## 2018-07-25 PROCEDURE — 63600175 PHARM REV CODE 636 W HCPCS: Performed by: INTERNAL MEDICINE

## 2018-07-25 PROCEDURE — 25000003 PHARM REV CODE 250: Performed by: PEDIATRICS

## 2018-07-25 PROCEDURE — 99291 CRITICAL CARE FIRST HOUR: CPT | Mod: ,,, | Performed by: ANESTHESIOLOGY

## 2018-07-25 PROCEDURE — 25000003 PHARM REV CODE 250: Performed by: STUDENT IN AN ORGANIZED HEALTH CARE EDUCATION/TRAINING PROGRAM

## 2018-07-25 PROCEDURE — 99232 SBSQ HOSP IP/OBS MODERATE 35: CPT | Mod: GC,,, | Performed by: INTERNAL MEDICINE

## 2018-07-25 PROCEDURE — 25000003 PHARM REV CODE 250: Performed by: INTERNAL MEDICINE

## 2018-07-25 PROCEDURE — 94761 N-INVAS EAR/PLS OXIMETRY MLT: CPT

## 2018-07-25 PROCEDURE — 84100 ASSAY OF PHOSPHORUS: CPT

## 2018-07-25 PROCEDURE — C9113 INJ PANTOPRAZOLE SODIUM, VIA: HCPCS | Performed by: STUDENT IN AN ORGANIZED HEALTH CARE EDUCATION/TRAINING PROGRAM

## 2018-07-25 PROCEDURE — 99233 SBSQ HOSP IP/OBS HIGH 50: CPT | Mod: GC,,, | Performed by: INTERNAL MEDICINE

## 2018-07-25 PROCEDURE — 82248 BILIRUBIN DIRECT: CPT

## 2018-07-25 PROCEDURE — P9035 PLATELET PHERES LEUKOREDUCED: HCPCS

## 2018-07-25 PROCEDURE — 36430 TRANSFUSION BLD/BLD COMPNT: CPT

## 2018-07-25 PROCEDURE — 99900022

## 2018-07-25 PROCEDURE — 85025 COMPLETE CBC W/AUTO DIFF WBC: CPT

## 2018-07-25 PROCEDURE — 27000221 HC OXYGEN, UP TO 24 HOURS

## 2018-07-25 PROCEDURE — 85610 PROTHROMBIN TIME: CPT

## 2018-07-25 PROCEDURE — 82977 ASSAY OF GGT: CPT

## 2018-07-25 PROCEDURE — 90945 DIALYSIS ONE EVALUATION: CPT

## 2018-07-25 PROCEDURE — 25000003 PHARM REV CODE 250: Performed by: PHYSICIAN ASSISTANT

## 2018-07-25 PROCEDURE — S5010 5% DEXTROSE AND 0.45% SALINE: HCPCS | Performed by: PEDIATRICS

## 2018-07-25 PROCEDURE — 85730 THROMBOPLASTIN TIME PARTIAL: CPT

## 2018-07-25 PROCEDURE — 25000003 PHARM REV CODE 250: Performed by: TRANSPLANT SURGERY

## 2018-07-25 PROCEDURE — 94799 UNLISTED PULMONARY SVC/PX: CPT

## 2018-07-25 PROCEDURE — 99900035 HC TECH TIME PER 15 MIN (STAT)

## 2018-07-25 PROCEDURE — 82803 BLOOD GASES ANY COMBINATION: CPT

## 2018-07-25 PROCEDURE — 99900026 HC AIRWAY MAINTENANCE (STAT)

## 2018-07-25 PROCEDURE — 63600175 PHARM REV CODE 636 W HCPCS: Performed by: PEDIATRICS

## 2018-07-25 PROCEDURE — 25000003 PHARM REV CODE 250: Performed by: GENERAL PRACTICE

## 2018-07-25 PROCEDURE — 80069 RENAL FUNCTION PANEL: CPT | Mod: 91

## 2018-07-25 PROCEDURE — 63600175 PHARM REV CODE 636 W HCPCS: Performed by: STUDENT IN AN ORGANIZED HEALTH CARE EDUCATION/TRAINING PROGRAM

## 2018-07-25 PROCEDURE — 37799 UNLISTED PX VASCULAR SURGERY: CPT

## 2018-07-25 PROCEDURE — 80197 ASSAY OF TACROLIMUS: CPT

## 2018-07-25 PROCEDURE — 20000000 HC ICU ROOM

## 2018-07-25 PROCEDURE — 80053 COMPREHEN METABOLIC PANEL: CPT

## 2018-07-25 PROCEDURE — 94003 VENT MGMT INPAT SUBQ DAY: CPT

## 2018-07-25 RX ORDER — ACYCLOVIR 200 MG/1
400 CAPSULE ORAL 2 TIMES DAILY
Qty: 120 CAPSULE | Refills: 2 | Status: SHIPPED | OUTPATIENT
Start: 2018-07-24 | End: 2019-03-29 | Stop reason: SDUPTHER

## 2018-07-25 RX ORDER — MAGNESIUM SULFATE HEPTAHYDRATE 40 MG/ML
2 INJECTION, SOLUTION INTRAVENOUS
Status: DISCONTINUED | OUTPATIENT
Start: 2018-07-25 | End: 2018-07-26

## 2018-07-25 RX ORDER — HYDROCODONE BITARTRATE AND ACETAMINOPHEN 500; 5 MG/1; MG/1
TABLET ORAL CONTINUOUS
Status: DISCONTINUED | OUTPATIENT
Start: 2018-07-25 | End: 2018-07-26

## 2018-07-25 RX ORDER — MYCOPHENOLATE MOFETIL 250 MG/1
1000 CAPSULE ORAL 2 TIMES DAILY
Qty: 240 CAPSULE | Refills: 2 | Status: SHIPPED | OUTPATIENT
Start: 2018-07-25 | End: 2018-08-17 | Stop reason: SDUPTHER

## 2018-07-25 RX ORDER — SULFAMETHOXAZOLE AND TRIMETHOPRIM 400; 80 MG/1; MG/1
1 TABLET ORAL DAILY
Qty: 30 TABLET | Refills: 5 | Status: SHIPPED | OUTPATIENT
Start: 2018-07-24 | End: 2018-08-17 | Stop reason: HOSPADM

## 2018-07-25 RX ORDER — HYDROCODONE BITARTRATE AND ACETAMINOPHEN 500; 5 MG/1; MG/1
TABLET ORAL
Status: DISCONTINUED | OUTPATIENT
Start: 2018-07-25 | End: 2018-07-26

## 2018-07-25 RX ORDER — TACROLIMUS 1 MG/1
6 CAPSULE ORAL EVERY 12 HOURS
Qty: 360 CAPSULE | Refills: 11 | Status: SHIPPED | OUTPATIENT
Start: 2018-07-25 | End: 2018-08-17 | Stop reason: SDUPTHER

## 2018-07-25 RX ORDER — SERTRALINE HYDROCHLORIDE 50 MG/1
50 TABLET, FILM COATED ORAL DAILY
Qty: 30 TABLET | Refills: 11 | Status: SHIPPED | OUTPATIENT
Start: 2018-07-26 | End: 2018-08-17 | Stop reason: HOSPADM

## 2018-07-25 RX ORDER — PREDNISONE 10 MG/1
TABLET ORAL
Qty: 60 TABLET | Refills: 0 | Status: SHIPPED | OUTPATIENT
Start: 2018-07-25 | End: 2018-09-07 | Stop reason: ALTCHOICE

## 2018-07-25 RX ORDER — DOCUSATE SODIUM 100 MG/1
100 CAPSULE, LIQUID FILLED ORAL 3 TIMES DAILY PRN
Qty: 30 CAPSULE | Refills: 0 | COMMUNITY
Start: 2018-07-25 | End: 2020-07-29

## 2018-07-25 RX ORDER — ACYCLOVIR 200 MG/1
400 CAPSULE ORAL 2 TIMES DAILY
Status: DISCONTINUED | OUTPATIENT
Start: 2018-07-25 | End: 2018-07-31

## 2018-07-25 RX ADMIN — DEXTROSE 40 MG: 50 INJECTION, SOLUTION INTRAVENOUS at 10:07

## 2018-07-25 RX ADMIN — INSULIN ASPART 1 UNITS: 100 INJECTION, SOLUTION INTRAVENOUS; SUBCUTANEOUS at 04:07

## 2018-07-25 RX ADMIN — Medication 1 MG: at 08:07

## 2018-07-25 RX ADMIN — LEVETIRACETAM 500 MG: 5 INJECTION INTRAVENOUS at 08:07

## 2018-07-25 RX ADMIN — INSULIN ASPART 2 UNITS: 100 INJECTION, SOLUTION INTRAVENOUS; SUBCUTANEOUS at 08:07

## 2018-07-25 RX ADMIN — INSULIN ASPART 2 UNITS: 100 INJECTION, SOLUTION INTRAVENOUS; SUBCUTANEOUS at 11:07

## 2018-07-25 RX ADMIN — Medication 1 MG: at 05:07

## 2018-07-25 RX ADMIN — Medication 220 MG: at 10:07

## 2018-07-25 RX ADMIN — NYSTATIN 500000 UNITS: 500000 SUSPENSION ORAL at 08:07

## 2018-07-25 RX ADMIN — SULFAMETHOXAZOLE AND TRIMETHOPRIM 1 TABLET: 400; 80 TABLET ORAL at 08:07

## 2018-07-25 RX ADMIN — Medication 1000 MG: at 10:07

## 2018-07-25 RX ADMIN — HEPARIN SODIUM 5000 UNITS: 5000 INJECTION, SOLUTION INTRAVENOUS; SUBCUTANEOUS at 02:07

## 2018-07-25 RX ADMIN — INSULIN ASPART 1 UNITS: 100 INJECTION, SOLUTION INTRAVENOUS; SUBCUTANEOUS at 07:07

## 2018-07-25 RX ADMIN — Medication 220 MG: at 08:07

## 2018-07-25 RX ADMIN — ACYCLOVIR 400 MG: 200 CAPSULE ORAL at 08:07

## 2018-07-25 RX ADMIN — TRIAMCINOLONE ACETONIDE: 1 CREAM TOPICAL at 08:07

## 2018-07-25 RX ADMIN — OXYCODONE HYDROCHLORIDE AND ACETAMINOPHEN 1000 MG: 500 TABLET ORAL at 08:07

## 2018-07-25 RX ADMIN — FLUCONAZOLE 200 MG: 200 TABLET ORAL at 08:07

## 2018-07-25 RX ADMIN — TRIAMCINOLONE ACETONIDE: 1 CREAM TOPICAL at 10:07

## 2018-07-25 RX ADMIN — CEFTRIAXONE SODIUM 2 G: 2 INJECTION, POWDER, FOR SOLUTION INTRAMUSCULAR; INTRAVENOUS at 04:07

## 2018-07-25 RX ADMIN — SERTRALINE HYDROCHLORIDE 50 MG: 50 TABLET ORAL at 08:07

## 2018-07-25 RX ADMIN — METHYLPREDNISOLONE SODIUM SUCCINATE 80 MG: 125 INJECTION, POWDER, FOR SOLUTION INTRAMUSCULAR; INTRAVENOUS at 10:07

## 2018-07-25 RX ADMIN — MORPHINE 450 MG: 10 SOLUTION ORAL at 08:07

## 2018-07-25 RX ADMIN — Medication 1000 MG: at 09:07

## 2018-07-25 RX ADMIN — ATORVASTATIN CALCIUM 40 MG: 20 TABLET, FILM COATED ORAL at 08:07

## 2018-07-25 RX ADMIN — SODIUM CHLORIDE: 0.9 INJECTION, SOLUTION INTRAVENOUS at 08:07

## 2018-07-25 RX ADMIN — LEVETIRACETAM 500 MG: 5 INJECTION INTRAVENOUS at 10:07

## 2018-07-25 RX ADMIN — ACYCLOVIR 400 MG: 200 CAPSULE ORAL at 10:07

## 2018-07-25 RX ADMIN — INSULIN ASPART 2 UNITS: 100 INJECTION, SOLUTION INTRAVENOUS; SUBCUTANEOUS at 03:07

## 2018-07-25 RX ADMIN — VASOPRESSIN 0.02 UNITS/MIN: 20 INJECTION INTRAVENOUS at 04:07

## 2018-07-25 RX ADMIN — HEPARIN SODIUM 5000 UNITS: 5000 INJECTION, SOLUTION INTRAVENOUS; SUBCUTANEOUS at 05:07

## 2018-07-25 RX ADMIN — DEXTROSE 40 MG: 50 INJECTION, SOLUTION INTRAVENOUS at 08:07

## 2018-07-25 RX ADMIN — DEXTROSE AND SODIUM CHLORIDE: 5; .45 INJECTION, SOLUTION INTRAVENOUS at 02:07

## 2018-07-25 RX ADMIN — HEPARIN SODIUM 5000 UNITS: 5000 INJECTION, SOLUTION INTRAVENOUS; SUBCUTANEOUS at 10:07

## 2018-07-25 RX ADMIN — METHYLPREDNISOLONE SODIUM SUCCINATE 80 MG: 125 INJECTION, POWDER, FOR SOLUTION INTRAMUSCULAR; INTRAVENOUS at 08:07

## 2018-07-25 NOTE — SUBJECTIVE & OBJECTIVE
Hospital Course:  Admitted to the ICU after a liver transplant, intubated and on minimal pressor support. He received intra op dialysis and was transfused 9u pRBC with 4.5L cell saver with a 14L EBL. He has remained HDS and on minimal sedation with 0.2 dilaudid pushes. He is on minimal vent settings, but has not been awake enough to follow commands. He did not tolerated CRRT overnight as he became hypotensive and required additional pressor support. His liver US was unremarkable and his graft function continues to show improvement.    Interval History:  NAEO. Remained sedated on low dose precedex overnight but began to follow commands early this AM. His sedation was weaned and he was extubated without difficulty. He is alert and oriented to person but not place or situation. He remains anuric, but HDS.    Scheduled Meds:   acyclovir  400 mg Oral BID    ascorbic acid (vitamin C)  1,000 mg Oral Daily    atorvastatin  40 mg Oral Daily    cefTRIAXone (ROCEPHIN) IVPB  2 g Intravenous Q24H    docusate sodium  100 mg Oral Daily    ergocalciferol  50,000 Units Oral Q7 Days    fluconazole  200 mg Oral Daily    heparin (porcine)  5,000 Units Subcutaneous Q8H    levetiracetam IVPB  500 mg Intravenous Q12H    methylPREDNISolone sodium succinate  80 mg Intravenous Q12H    Followed by    [START ON 7/26/2018] methylPREDNISolone sodium succinate  60 mg Intravenous Q12H    Followed by    [START ON 7/27/2018] methylPREDNISolone sodium succinate  40 mg Intravenous Q12H    Followed by    [START ON 7/28/2018] methylPREDNISolone sodium succinate  20 mg Intravenous Q12H    Followed by    [START ON 7/29/2018] predniSONE  20 mg Oral Daily    mycophenolate mofetil  1,000 mg Oral BID    pantoprazole 40 mg in dextrose 5 % 100 mL IVPB (over 15 minutes) (ready to mix system)  40 mg Intravenous BID    sertraline  50 mg Oral Daily    sulfamethoxazole-trimethoprim 400-80mg  1 tablet Oral Daily    tacrolimus  1 mg Oral BID     triamcinolone acetonide 0.1%   Topical (Top) BID    zinc sulfate  220 mg Oral BID     Continuous Infusions:   dexmedetomidine (PRECEDEX) infusion Stopped (07/25/18 0400)    insulin (HUMAN R) infusion (adults) 1.5 Units/hr (07/25/18 1700)    norepinephrine bitartrate-D5W Stopped (07/24/18 0900)    vasopressin (PITRESSIN) infusion 0.02 Units/min (07/25/18 0945)     PRN Meds:sodium chloride, sodium chloride, acetaminophen, dextrose 50%, dextrose 50%, diphenhydrAMINE-zinc acetate 1-0.1%, glucagon (human recombinant), glucose, glucose, HYDROmorphone, insulin aspart U-100, metoclopramide HCl, ondansetron, sodium chloride 0.9%    Review of Systems   Unable to perform ROS: Mental status change     Objective:     Vital Signs (Most Recent):  Temp: 97.6 °F (36.4 °C) (07/25/18 1500)  Pulse: (!) 51 (07/25/18 1700)  Resp: 10 (07/25/18 1700)  BP: 129/60 (07/25/18 1700)  SpO2: 99 % (07/25/18 1700) Vital Signs (24h Range):  Temp:  [96.7 °F (35.9 °C)-98.2 °F (36.8 °C)] 97.6 °F (36.4 °C)  Pulse:  [40-81] 51  Resp:  [0-50] 10  SpO2:  [97 %-100 %] 99 %  BP: ()/(51-68) 129/60  Arterial Line BP: ()/(41-59) 135/51     Weight: 122.5 kg (270 lb 1 oz)  Body mass index is 34.67 kg/m².    Intake/Output - Last 3 Shifts       07/23 0700 - 07/24 0659 07/24 0700 - 07/25 0659 07/25 0700 - 07/26 0659    I.V. (mL/kg) 92878 (128.4) 1758.5 (14.4) 722 (5.9)    Blood 2427 287 505    Other 400      NG/GT 25 380     Total Intake(mL/kg) 04158 (151.2) 2425.5 (19.8) 1227 (10)    Urine (mL/kg/hr) 312 (0.1) 20 (0) 32 (0)    Drains 7235 (2.4) 1673 (0.6) 192 (0.1)    Other 451 (0.2)      Total Output 7998 1693 224    Net +42074 +732.5 +1003           Stool Occurrence  1 x 1 x          Physical Exam   Constitutional: He appears well-developed and well-nourished.   HENT:   Head: Normocephalic and atraumatic.   Eyes: No scleral icterus.   Cardiovascular: Normal rate and regular rhythm.    No murmur heard.  Pulmonary/Chest: Effort normal and breath  sounds normal. He has no wheezes.   Abdominal: Soft. He exhibits no distension.   Musculoskeletal: He exhibits edema.   Lymphadenopathy:     He has no cervical adenopathy.   Neurological: He is alert.   Skin: Skin is warm and dry. He is not diaphoretic. There is erythema.   Dry flaking skin throughout       Laboratory:  Immunosuppressants         Stop Route Frequency     tacrolimus (PROGRAF) 1 mg/mL oral syringe      -- Oral 2 times daily     mycophenolate mofetil 200 mg/mL suspension 1,000 mg      -- Oral 2 times daily        CBC:     Recent Labs  Lab 07/25/18  0354   WBC 2.51*   RBC 2.45*   HGB 8.0*   HCT 24.1*   PLT 21*   MCV 98   MCH 32.7*   MCHC 33.2     CMP:     Recent Labs  Lab 07/25/18  0354 07/25/18  1401   *  261*  261* 221*  221*   CALCIUM 7.4*  7.4*  7.4* 7.3*  7.3*   ALBUMIN 2.2*  2.2* 2.4*   PROT 3.6*  --      138  138 137  137   K 5.1  5.1  5.1 5.1  5.1   CO2 21*  21*  21* 18*  18*     108  108 106  106   BUN 59*  59*  59* 68*  68*   CREATININE 3.7*  3.7*  3.7* 3.8*  3.8*   ALKPHOS 72  --    ALT 91*  --    AST 77*  --    BILITOT 1.5*  --      Coagulation:     Recent Labs  Lab 07/25/18  0354   INR 1.2   APTT 30.3     ABGs:     Recent Labs  Lab 07/25/18  1147   PH 7.373   PCO2 38.2   HCO3 22.3*   POCSATURATED 100   BE -3     Labs within the past 24 hours have been reviewed.    Diagnostic Results:  US Liver Transplant Post:  No results found for this or any previous visit.  I have personally reviewed all pertinent imaging studies.

## 2018-07-25 NOTE — SUBJECTIVE & OBJECTIVE
Interval History: patient is extubated, vs stable, not in acute distress. UOP 20cc.Cr increased 3.7.     Review of patient's allergies indicates:   Allergen Reactions    Nsaids (non-steroidal anti-inflammatory drug)      D/t to liver disease.    Tylenol [acetaminophen]      D/t liver disease.    Penicillins Other (See Comments)     Tolerated pip-tazo in 8/2016 without issue  Tolerated cefepime 7/2018 without issue  Since childhood was told not to take it     Current Facility-Administered Medications   Medication Frequency    0.9%  NaCl infusion (for blood administration) Q24H PRN    0.9%  NaCl infusion (for blood administration) Q24H PRN    acetaminophen tablet 650 mg Q8H PRN    acyclovir capsule 400 mg BID    ascorbic acid (vitamin C) tablet 1,000 mg Daily    atorvastatin tablet 40 mg Daily    cefTRIAXone (ROCEPHIN) 2 g in dextrose 5 % 50 mL IVPB Q24H    dexmedetomidine (PRECEDEX) 400mcg/100mL 0.9% NaCL infusion Continuous    dextrose 50% injection 12.5 g PRN    dextrose 50% injection 25 g PRN    diphenhydrAMINE-zinc acetate 1-0.1% cream TID PRN    docusate sodium capsule 100 mg Daily    ergocalciferol capsule 50,000 Units Q7 Days    fluconazole tablet 200 mg Daily    glucagon (human recombinant) injection 1 mg PRN    glucose chewable tablet 16 g PRN    glucose chewable tablet 24 g PRN    heparin (porcine) injection 5,000 Units Q8H    HYDROmorphone injection 0.2 mg Q2H PRN    insulin aspart U-100 pen 0-4 Units PRN    insulin regular (Humulin R) 100 Units in sodium chloride 0.9% 100 mL infusion Continuous    levETIRAcetam in NaCl (iso-os) IVPB 500 mg Q12H    methylPREDNISolone sodium succinate injection 80 mg Q12H    Followed by    [START ON 7/26/2018] methylPREDNISolone sodium succinate injection 60 mg Q12H    Followed by    [START ON 7/27/2018] methylPREDNISolone sodium succinate injection 40 mg Q12H    Followed by    [START ON 7/28/2018] methylPREDNISolone sodium succinate injection  20 mg Q12H    Followed by    [START ON 7/29/2018] predniSONE tablet 20 mg Daily    metoclopramide HCl injection 10 mg QID (AC + HS) PRN    mycophenolate mofetil 200 mg/mL suspension 1,000 mg BID    norepinephrine 4 mg in dextrose 5% 250 mL infusion (premix) (titrating) Continuous    ondansetron injection 4 mg Q8H PRN    pantoprazole 40 mg in dextrose 5 % 100 mL IVPB (over 15 minutes) (ready to mix system) BID    sertraline tablet 50 mg Daily    sodium chloride 0.9% flush 3 mL PRN    sulfamethoxazole-trimethoprim 400-80mg per tablet 1 tablet Daily    tacrolimus (PROGRAF) 1 mg/mL oral syringe BID    triamcinolone acetonide 0.1% cream BID    vasopressin (PITRESSIN) 0.2 Units/mL in dextrose 5 % 100 mL infusion Continuous    zinc sulfate capsule 220 mg BID       Objective:     Vital Signs (Most Recent):  Temp: 97.6 °F (36.4 °C) (07/25/18 1500)  Pulse: (!) 56 (07/25/18 1500)  Resp: 14 (07/25/18 1500)  BP: 135/64 (07/25/18 1500)  SpO2: 98 % (07/25/18 1500)  O2 Device (Oxygen Therapy): nasal cannula (07/25/18 1500) Vital Signs (24h Range):  Temp:  [96.7 °F (35.9 °C)-98.2 °F (36.8 °C)] 97.6 °F (36.4 °C)  Pulse:  [40-89] 56  Resp:  [0-50] 14  SpO2:  [97 %-100 %] 98 %  BP: ()/(51-68) 135/64  Arterial Line BP: ()/(41-61) 133/52     Weight: 122.5 kg (270 lb 1 oz) (07/25/18 0500)  Body mass index is 34.67 kg/m².  Body surface area is 2.53 meters squared.    I/O last 3 completed shifts:  In: 3905.5 [I.V.:3213.5; Blood:287; NG/GT:405]  Out: 3704 [Urine:70; Drains:3634]    Physical Exam   Constitutional: He is oriented to person, place, and time. He appears well-developed and well-nourished. No distress.   HENT:   Head: Normocephalic and atraumatic.   Eyes: Conjunctivae and EOM are normal. Pupils are equal, round, and reactive to light.   Neck: Normal range of motion. Neck supple. No JVD present.   Cardiovascular: Normal rate, regular rhythm, normal heart sounds and intact distal pulses.    Pulmonary/Chest:  Breath sounds normal. He is in respiratory distress.   Abdominal: Soft.   Musculoskeletal: He exhibits edema. He exhibits no tenderness or deformity.   Neurological: He is alert and oriented to person, place, and time.   Skin: Skin is warm and dry. He is not diaphoretic.   Nursing note and vitals reviewed.      Significant Labs:  CBC:   Recent Labs  Lab 07/25/18  0354   WBC 2.51*   RBC 2.45*   HGB 8.0*   HCT 24.1*   PLT 21*   MCV 98   MCH 32.7*   MCHC 33.2     CMP:   Recent Labs  Lab 07/25/18  0354 07/25/18  1401   *  261*  261* 221*  221*   CALCIUM 7.4*  7.4*  7.4* 7.3*  7.3*   ALBUMIN 2.2*  2.2* 2.4*   PROT 3.6*  --      138  138 137  137   K 5.1  5.1  5.1 5.1  5.1   CO2 21*  21*  21* 18*  18*     108  108 106  106   BUN 59*  59*  59* 68*  68*   CREATININE 3.7*  3.7*  3.7* 3.8*  3.8*   ALKPHOS 72  --    ALT 91*  --    AST 77*  --    BILITOT 1.5*  --         Significant Imaging:  X-Ray: Reviewed  bibasilar edema

## 2018-07-25 NOTE — ASSESSMENT & PLAN NOTE
- continue to monitor serial RFPs and strict Is & Os  - Avoid nephrotoxic medications  - Maintain MAP >65  - Hb > 7gm/dL  - Patient still has low side bp reading, will  increase vasopressin dose and try CRRT

## 2018-07-25 NOTE — SUBJECTIVE & OBJECTIVE
"Interval HPI:   Overnight events:  AF, bradycardic.    On transition at 1.3u/h, without up to date POC.  BG Appears to trend upward.    Diet - NPO  No hypoglycemia.  On steroid taper    BP (!) 118/58   Pulse (!) 47   Temp 96.8 °F (36 °C) (Core ([place]))   Resp 10   Ht 6' 2" (1.88 m)   Wt 122.5 kg (270 lb 1 oz)   SpO2 98%   BMI 34.67 kg/m²     Labs Reviewed and Include      Recent Labs  Lab 07/25/18  0354   *  261*  261*   CALCIUM 7.4*  7.4*  7.4*   ALBUMIN 2.2*  2.2*   PROT 3.6*     138  138   K 5.1  5.1  5.1   CO2 21*  21*  21*     108  108   BUN 59*  59*  59*   CREATININE 3.7*  3.7*  3.7*   ALKPHOS 72   ALT 91*   AST 77*   BILITOT 1.5*     Lab Results   Component Value Date    WBC 2.51 (L) 07/25/2018    HGB 8.0 (L) 07/25/2018    HCT 24.1 (L) 07/25/2018    MCV 98 07/25/2018    PLT 21 (LL) 07/25/2018       Recent Labs  Lab 07/20/18  0639   TSH 2.714   FREET4 0.53*     Lab Results   Component Value Date    HGBA1C 5.2 07/22/2018       Nutritional status:   Body mass index is 34.67 kg/m².  Lab Results   Component Value Date    ALBUMIN 2.2 (L) 07/25/2018    ALBUMIN 2.2 (L) 07/25/2018    ALBUMIN 2.1 (L) 07/24/2018     Lab Results   Component Value Date    PREALBUMIN 4 (L) 06/30/2018    PREALBUMIN 5 (L) 01/22/2018    PREALBUMIN 7 (L) 08/31/2016       Estimated Creatinine Clearance: 28.8 mL/min (A) (based on SCr of 3.7 mg/dL (H)).    Accu-Checks  Recent Labs      07/24/18   0602  07/24/18   0731  07/24/18   0829  07/24/18   0901  07/24/18   1008  07/24/18   1116  07/24/18   1205  07/24/18   1516  07/24/18 2005 07/24/18   2355   POCTGLUCOSE  112*  94  116*  128*  162*  185*  178*  213*  202*  220*       Current Medications and/or Treatments Impacting Glycemic Control  Immunotherapy:  Immunosuppressants         Stop Route Frequency     tacrolimus (PROGRAF) 1 mg/mL oral syringe      -- Oral 2 times daily     mycophenolate mofetil 200 mg/mL suspension 1,000 mg      -- Oral 2 " times daily        Steroids:   Hormones     Start     Stop Route Frequency Ordered    07/29/18 0900  predniSONE tablet 20 mg  (methylprednisolone taper panel)      -- Oral Daily 07/23/18 1326    07/28/18 0900  methylPREDNISolone sodium succinate injection 20 mg  (methylprednisolone taper panel)      07/29 0859 IV Every 12 hours 07/23/18 1326    07/27/18 0900  methylPREDNISolone sodium succinate injection 40 mg  (methylprednisolone taper panel)      07/28 0859 IV Every 12 hours 07/23/18 1326    07/26/18 0900  methylPREDNISolone sodium succinate injection 60 mg  (methylprednisolone taper panel)      07/27 0859 IV Every 12 hours 07/23/18 1326    07/25/18 0900  methylPREDNISolone sodium succinate injection 80 mg  (methylprednisolone taper panel)      07/26 0859 IV Every 12 hours 07/23/18 1326 07/23/18 1330  vasopressin (PITRESSIN) 0.2 Units/mL in dextrose 5 % 100 mL infusion      -- IV Continuous 07/23/18 1326        Pressors:    Autonomic Drugs     Start     Stop Route Frequency Ordered    07/23/18 1930  norepinephrine 4 mg in dextrose 5% 250 mL infusion (premix) (titrating)     Question Answer Comment   Titrate by: (in mcg/kg/min) 0.05    Titrate interval: (in minutes) 5    Titrate to maintain: (MAP or SBP) MAP    Greater than: (in mmHg) 90    Maximum dose: (in mcg/kg/min) 3        -- IV Continuous 07/23/18 1828        Hyperglycemia/Diabetes Medications: Antihyperglycemics     Start     Stop Route Frequency Ordered    07/24/18 1245  insulin regular (Humulin R) 100 Units in sodium chloride 0.9% 100 mL infusion      -- IV Continuous 07/24/18 1137    07/24/18 1236  insulin aspart U-100 pen 0-4 Units      -- SubQ As needed (PRN) 07/24/18 1137

## 2018-07-25 NOTE — ASSESSMENT & PLAN NOTE
Neuro:   - alert and oriented to person only  - no sedation, pain control prn     Pulmonary:   - 2L NC, wean as tolerated to maintain sats >95%  - monitor     Cardiac:  - HDS  - vaso 0.04  - CVP 6-8     Renal:   - BUN/Cr 68/3.8  - UOP minimal ~10cc overnight  - follow up nephrology recs for dialysis options     Infectious Disease:   - WBC 2.54  - rocephin for donor positive cultures  - Trend WBC     Hematology/Oncology:  - H/H 8/24  - platelets this AM for drain removal  - Trend CBC     Endocrine:  - insulin gtt 0.9  - monitor     Fluids/Electrolytes/Nutrition/GI:   - NPO, CLD once bed side swallow passed  - Trend CMP  - Replace Lytes PRN     Pain Management::   - dilaudid 0.2 mg prn     Dispo:  Continue SICU care

## 2018-07-25 NOTE — PROGRESS NOTES
"[facility]  Endocrinology  Progress Note    Admit Date: 6/22/2018     Reason for Consult: Management of hypothermia and followed by consult on steroid induced hyperglycemia     Diabetes diagnosis year: none      HPI:   Patient is a 63 y.o. 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 Hospital course was complicated by hepatic encephalopathy. During the course, he has also had few episodes of hypothermia with temp as low as 93 F. He completed 7 day course of vancomycin for cellilitis, and is currently on vancomycin and cefepime for possible sepsis. However he continues to be hypothermic.     Further work-up of hypothermia was done, which showed TSH: 2.714, FT4: 0.53, AM cortisol: 9.9.    Interval HPI:   Overnight events:  AF, bradycardic.    On transition at 1.3u/h, without up to date POC.  BG Appears to trend upward.    Diet - NPO  No hypoglycemia.  On steroid taper    BP (!) 118/58   Pulse (!) 47   Temp 96.8 °F (36 °C) (Core ([place]))   Resp 10   Ht 6' 2" (1.88 m)   Wt 122.5 kg (270 lb 1 oz)   SpO2 98%   BMI 34.67 kg/m²       Labs Reviewed and Include      Recent Labs  Lab 07/25/18  0354   *  261*  261*   CALCIUM 7.4*  7.4*  7.4*   ALBUMIN 2.2*  2.2*   PROT 3.6*     138  138   K 5.1  5.1  5.1   CO2 21*  21*  21*     108  108   BUN 59*  59*  59*   CREATININE 3.7*  3.7*  3.7*   ALKPHOS 72   ALT 91*   AST 77*   BILITOT 1.5*     Lab Results   Component Value Date    WBC 2.51 (L) 07/25/2018    HGB 8.0 (L) 07/25/2018    HCT 24.1 (L) 07/25/2018    MCV 98 07/25/2018    PLT 21 (LL) 07/25/2018       Recent Labs  Lab 07/20/18  0639   TSH 2.714   FREET4 0.53* "     Lab Results   Component Value Date    HGBA1C 5.2 07/22/2018       Nutritional status:   Body mass index is 34.67 kg/m².  Lab Results   Component Value Date    ALBUMIN 2.2 (L) 07/25/2018    ALBUMIN 2.2 (L) 07/25/2018    ALBUMIN 2.1 (L) 07/24/2018     Lab Results   Component Value Date    PREALBUMIN 4 (L) 06/30/2018    PREALBUMIN 5 (L) 01/22/2018    PREALBUMIN 7 (L) 08/31/2016       Estimated Creatinine Clearance: 28.8 mL/min (A) (based on SCr of 3.7 mg/dL (H)).    Accu-Checks  Recent Labs      07/24/18   0602  07/24/18   0731  07/24/18   0829  07/24/18   0901  07/24/18   1008  07/24/18   1116  07/24/18   1205  07/24/18   1516  07/24/18   2005  07/24/18   2355   POCTGLUCOSE  112*  94  116*  128*  162*  185*  178*  213*  202*  220*       Current Medications and/or Treatments Impacting Glycemic Control  Immunotherapy:  Immunosuppressants         Stop Route Frequency     tacrolimus (PROGRAF) 1 mg/mL oral syringe      -- Oral 2 times daily     mycophenolate mofetil 200 mg/mL suspension 1,000 mg      -- Oral 2 times daily        Steroids:   Hormones     Start     Stop Route Frequency Ordered    07/29/18 0900  predniSONE tablet 20 mg  (methylprednisolone taper panel)      -- Oral Daily 07/23/18 1326    07/28/18 0900  methylPREDNISolone sodium succinate injection 20 mg  (methylprednisolone taper panel)      07/29 0859 IV Every 12 hours 07/23/18 1326    07/27/18 0900  methylPREDNISolone sodium succinate injection 40 mg  (methylprednisolone taper panel)      07/28 0859 IV Every 12 hours 07/23/18 1326    07/26/18 0900  methylPREDNISolone sodium succinate injection 60 mg  (methylprednisolone taper panel)      07/27 0859 IV Every 12 hours 07/23/18 1326    07/25/18 0900  methylPREDNISolone sodium succinate injection 80 mg  (methylprednisolone taper panel)      07/26 0859 IV Every 12 hours 07/23/18 1326    07/23/18 1330  vasopressin (PITRESSIN) 0.2 Units/mL in dextrose 5 % 100 mL infusion      -- IV Continuous 07/23/18 1326         Pressors:    Autonomic Drugs     Start     Stop Route Frequency Ordered    07/23/18 1930  norepinephrine 4 mg in dextrose 5% 250 mL infusion (premix) (titrating)     Question Answer Comment   Titrate by: (in mcg/kg/min) 0.05    Titrate interval: (in minutes) 5    Titrate to maintain: (MAP or SBP) MAP    Greater than: (in mmHg) 90    Maximum dose: (in mcg/kg/min) 3        -- IV Continuous 07/23/18 1828        Hyperglycemia/Diabetes Medications: Antihyperglycemics     Start     Stop Route Frequency Ordered    07/24/18 1245  insulin regular (Humulin R) 100 Units in sodium chloride 0.9% 100 mL infusion      -- IV Continuous 07/24/18 1137    07/24/18 1236  insulin aspart U-100 pen 0-4 Units      -- SubQ As needed (PRN) 07/24/18 1137          ASSESSMENT and PLAN    * Acute kidney injury superimposed on chronic kidney disease      Avoid insulin stacking        Steroid-induced hyperglycemia    No previous history of DM  BG goal 140-180    Currently on transition at 1.3u/h.  Global hyperglycemia.     Recommend  Increasing transition IV to 1.5u/h   Patient NPO, but once diet advanced will order scheduled.  Continue low dose correction  POC q4h while NPO    Discharge:  TBD.          Liver transplanted      S/p transplanted.  On high dose steroids and immunosuppressants.  Currently on scheduled steroid taper.  Per transplant          Coronary artery disease involving native coronary artery of native heart without angina pectoris      Avoid hypoglycemia            Alesia Telles MD  Endocrinology  [facility]

## 2018-07-25 NOTE — SUBJECTIVE & OBJECTIVE
Interval History/Significant Events: NAEON, AFVSS.  Remains intubated because of mental status.  On vaso 0.02.  Did not tolerate CRRT due to hypotension.    Follow-up For: Procedure(s) (LRB):  TRANSPLANT, LIVER (N/A)    Post-Operative Day: 2 Days Post-Op    Objective:     Vital Signs (Most Recent):  Temp: 96.8 °F (36 °C) (07/25/18 0700)  Pulse: (!) 52 (07/25/18 0700)  Resp: 13 (07/25/18 0700)  BP: 128/68 (07/25/18 0700)  SpO2: 100 % (07/25/18 0700) Vital Signs (24h Range):  Temp:  [96.8 °F (36 °C)-98.9 °F (37.2 °C)] 96.8 °F (36 °C)  Pulse:  [40-89] 52  Resp:  [0-32] 13  SpO2:  [98 %-100 %] 100 %  BP: ()/(51-79) 128/68  Arterial Line BP: ()/(41-70) 116/54     Weight: 122.5 kg (270 lb 1 oz)  Body mass index is 34.67 kg/m².      Intake/Output Summary (Last 24 hours) at 07/25/18 0826  Last data filed at 07/25/18 0700   Gross per 24 hour   Intake           2125.5 ml   Output             1679 ml   Net            446.5 ml       Physical Exam   Constitutional: He appears well-developed and well-nourished. No distress.   HENT:   Head: Normocephalic and atraumatic.   Eyes: Pupils are equal, round, and reactive to light.   Neck: Neck supple. No JVD present. No tracheal deviation present.   Cardiovascular: Normal rate, regular rhythm and normal heart sounds.    Pulmonary/Chest: Effort normal and breath sounds normal. No respiratory distress.   intubated   Abdominal: Soft. Bowel sounds are normal. He exhibits no distension. There is no tenderness. There is no guarding.   Incision in upper quadrants, Liver transplant surgery    Neurological:   Unconscious, intubated   Skin: He is not diaphoretic.   Vitals reviewed.      Vents:  Vent Mode: SIMV (07/25/18 0700)  Set Rate: 16 bmp (07/25/18 0700)  Vt Set: 500 mL (07/23/18 1446)  Pressure Support: 10 cmH20 (07/25/18 0700)  PEEP/CPAP: 5 cmH20 (07/25/18 0700)  Oxygen Concentration (%): 40 (07/25/18 0700)  Peak Airway Pressure: 13 cmH2O (07/25/18 0700)  Plateau Pressure: 12  cmH20 (07/25/18 0700)  Total Ve: 8.17 mL (07/25/18 0700)  F/VT Ratio<105 (RSBI): (!) 24.86 (07/25/18 0700)    Lines/Drains/Airways     Central Venous Catheter Line                 Introducer 07/23/18 0500 right internal jugular 2 days         Percutaneous Central Line Insertion/Assessment - double lumen  07/23/18 0500 right internal jugular 2 days         Percutaneous Central Line Insertion/Assessment - double lumen  07/23/18 0512 right femoral 2 days         Percutaneous Central Line Insertion/Assessment - triple lumen  07/23/18 0530 right internal jugular 2 days         Pulmonary Artery Catheter Assessment  07/23/18 0530 2 days          Drain                 Urethral Catheter 07/21/18 1706 3 days         Closed/Suction Drain 07/23/18 Right;Lateral Abdomen Bulb 2 days         Closed/Suction Drain 07/23/18 Right;Medial Abdomen Bulb 2 days         NG/OG Tube 07/23/18 orogastric 2 days          Airway                 Airway - Non-Surgical 07/23/18 0451 Endotracheal Tube 2 days          Arterial Line                 Arterial Line 07/23/18 0500 Right Femoral 2 days          Peripheral Intravenous Line                 Midline Catheter Insertion/Assessment  - Single Lumen 07/20/18 1128 Right cephalic vein (lateral side of arm) 18g x 10cm 4 days         Peripheral IV - Single Lumen 07/23/18 0511 Left Antecubital 2 days                Significant Labs:    CBC/Anemia Profile:    Recent Labs  Lab 07/24/18  1509 07/24/18  1906 07/25/18  0354   WBC 5.81 5.17 2.51*   HGB 8.4* 8.0* 8.0*   HCT 25.6* 23.9* 24.1*   PLT 12* 34* 21*   MCV 98 98 98   RDW 19.4* 18.9* 18.9*        Chemistries:    Recent Labs  Lab 07/23/18  1847  07/24/18  0300  07/24/18  1510 07/24/18  1906 07/24/18  2200 07/25/18  0354     < > 142  142  < > 139  --  139  139 138  138  138   K 4.2  < > 4.2  4.2  < > 4.6  --  4.7  4.7 5.1  5.1  5.1     < > 110  110  < > 109  --  109  109 108  108  108   CO2 16*  < > 23  23  < > 21*  --  21*  21*  21*  21*  21*   BUN 29*  < > 34*  34*  < > 45*  --  51*  51* 59*  59*  59*   CREATININE 2.2*  < > 2.4*  2.4*  < > 3.0*  --  3.2*  3.2* 3.7*  3.7*  3.7*   CALCIUM 8.0*  < > 8.0*  8.0*  < > 7.4*  --  7.1*  7.1* 7.4*  7.4*  7.4*   ALBUMIN 2.6*  --  2.7*  2.7*  --   --   --  2.1* 2.2*  2.2*   PROT 3.7*  --  4.0*  --   --   --   --  3.6*   BILITOT 4.3*  --  2.9*  --   --   --   --  1.5*   ALKPHOS 48*  --  55  --   --   --   --  72   *  --  152*  --   --   --   --  91*   *  < > 220*  220*  < > 121* 98*  --  77*   MG  --   --  2.4  --  2.2  --  2.4 2.5   PHOS  --   --  5.1*  5.1*  --   --   --  6.2* 7.3*  7.3*   < > = values in this interval not displayed.    All pertinent labs within the past 24 hours have been reviewed.    Significant Imaging:  I have reviewed all pertinent imaging results/findings within the past 24 hours.

## 2018-07-25 NOTE — PLAN OF CARE
Problem: Patient Care Overview  Goal: Plan of Care Review  Outcome: Ongoing (interventions implemented as appropriate)  Patient and patient's family updated on plan of care. Patient was extubated this morning without any difficulties and is currently on 3L nasal cannula with O2 saturation > 97%. HR in the upper 40-50's sinus becky. Vaso gtt turned off this morning and patient's systolic blood pressure is maintaining 100-160. CVP 7-8. All VSS.  Patient is lethargic and disoriented to time and situation. 1 unit of platelets given. About 30 ml of urine during shift. Plans to put patient on CRRT tonight. 2 bowel movements during shift. Lateral CHAS drain dc'd. Medial CHAS drain with about 25ml of serous output. All questions and concerns acknowledged and answered. See blake sheet for full assessment details. Will continue to monitor patient closely.     Gtts:  Insulin 1.5 units/hr    Nsg:  -160  POCT q 4 hours  CHAS drain q 4 hours

## 2018-07-25 NOTE — PROGRESS NOTES
Wound care follow up.   Patient with frequent stools and a small partial thickness open area to the right of the gluteal fold. This area appears to be improving as there is some new epithealial tissue seen. They are having difficulty keeping aquacel dressing clean with stools.     Recommend:  Zinc barrier BID and PRN to the periarea/buttocks  Pulsate on evolution  q2hour turns       07/25/18 0933       Wound 06/09/18 0100 Moisture associated dermatitis Buttocks   Date First Assessed/Time First Assessed: 06/09/18 0100   Pre-existing: Yes  Primary Wound Type: Moisture associated dermatitis  Location: Buttocks   Wound Image    Wound WDL ex   Dressing Appearance Dry;Intact   Drainage Amount Small   Drainage Characteristics/Odor Serosanguineous   Appearance Pink;Epithelialization  (50 pink/ 50 new skin)   Periwound Area Intact;Dry   Wound Edges Open   Wound Length (cm) 2 cm   Wound Width (cm) 2 cm   Wound Depth (cm) 2.1 cm   Wound Volume (cm^3) 8.4 cm^3   Care Soap and water   Dressing Other (see comments)  (zinc)   Dressing Change Due 07/25/18

## 2018-07-25 NOTE — ASSESSMENT & PLAN NOTE
63 yr old male with decompensated alcoholic cirrhosis, CKD III and CAD. Hospitalized with HE, VICKY on CKD. S/p OLT on 7/23 with intraop CRRT. Brought to the ICU intubated, not requiring pressor support. Significant blood loss, with aggressive resuscitation intraop    Neuro  -hx seizures and HE  -keppra  -Sedation turned off.    Cardiovascular  - h/o CAD  - 2D Echo 7/9 normal EF (55-60%), trivial tricuspid/mitral regurg  - On minimal vasopressin 0.02.    Respiratory:  -Intubated, minimal vent settings  -monitor daily ABG  -daily SBT, wean to extubate    Renal:  -VICKY on CKD-intraop CRRT, anuric  -Nephrology following, hypotension limiting factor  -[place], BUN/Cr    Liver and Pancreas  -s/p OLT 7/23   -Trend liver enzymes  -PLTs low.    GI  -NPO  -Replete lytes prn       ID  -Anti-rejection, antiretrovirals  -monitor WBC    Heme  -h/h stable, thrombocytopenia. 14 L blood loss intraop.   RBC (Units) 9   FFP (Units) 2   Cryoprecipitate (Units)  2   Platelets (Units) 2   -consider PLT administration for low PLT, no bleeding noted  -some heme output in OG tube, continue to monitor CBC and output    Dispo  -Primary team Transplant Surgery  -Monitor platelets, any bleeding, and other LFTs  -Wean to extubate  -Wean vasopressin  -Wean CRRT to HD  -Continue SICU care

## 2018-07-25 NOTE — PT/OT/SLP PROGRESS
Physical Therapy      Patient Name:  Alon Adamson   MRN:  [id number]    Patient not seen today secondary to  (Pt on hold 2nd to starting weaning parameters for vent. ). Will follow-up at a later date..    Nathalie Oliver, PT

## 2018-07-25 NOTE — ASSESSMENT & PLAN NOTE
No previous history of DM  BG goal 140-180    Currently on transition at 1.3u/h.  Global hyperglycemia.     Recommend  Increasing transition IV to 1.5u/h   Patient NPO, but once diet advanced will order scheduled.  Continue low dose correction  POC q4h while NPO    Discharge:  TBD.

## 2018-07-25 NOTE — PROGRESS NOTES
2nd note:  SW presented to patient's room. Patient's mother, wife, and child were present. SW spoke with patient's caregiver/wife, Vin. Caregiver presented as AAOx4 and engaged. Patient was observed lying in bed and intubated. Though intubated the patient was able to respond to physician's commands with a thumbs up. Caregiver provided SW with [employer] financial profile and patient was informed of $0 fee. The caregiver reported that she was coping adequately with the support of family and denies any current mental health difficulties. SW will continue to follow patient for resources, education, support and dc planning as appropriate. SW remains available.

## 2018-07-25 NOTE — PROGRESS NOTES
[facility]  Liver Transplant  Progress Note    Patient Name: Alon Adamson  MRN: [id number]  Admission Date: 2018  Hospital Length of Stay: 33 days  Code Status: Full Code  Primary Care Provider: Mckinley Vides MD  Post-Op Day 2 Days Post-Op    Admit Diagnosis: Alcoholic liver cirrhosis  Procedures: Liver transplant    ORGAN:   LIVER  Disease Etiology: Alcoholic Cirrhosis  Donor Type:    - Brain Death  CDC High Risk:   No  Donor CMV Status:   Donor CMV Status: Negative  Donor HBcAB:   Negative  Donor HCV Status:   Negative  Donor HBV TAMAR: Negative  Donor HCV TAMAR: Negative  Whole or Partial: Whole Liver  Biliary Anastomosis: End to End  Arterial Anatomy: Standard    Subjective:     Hospital Course:  Admitted to the ICU after a liver transplant, intubated and on minimal pressor support. He received intra op dialysis and was transfused 9u pRBC with 4.5L cell saver with a 14L EBL. He has remained HDS and on minimal sedation with 0.2 dilaudid pushes. He is on minimal vent settings, but has not been awake enough to follow commands. He did not tolerated CRRT overnight as he became hypotensive and required additional pressor support. His liver US was unremarkable and his graft function continues to show improvement.    Interval History:  NAEO. Remained sedated on low dose precedex overnight but began to follow commands early this AM. His sedation was weaned and he was extubated without difficulty. He is alert and oriented to person but not place or situation. He remains anuric, but HDS.    Scheduled Meds:   acyclovir  400 mg Oral BID    ascorbic acid (vitamin C)  1,000 mg Oral Daily    atorvastatin  40 mg Oral Daily    cefTRIAXone (ROCEPHIN) IVPB  2 g Intravenous Q24H    docusate sodium  100 mg Oral Daily    ergocalciferol  50,000 Units Oral Q7 Days    fluconazole  200 mg Oral Daily    heparin (porcine)  5,000 Units Subcutaneous Q8H    levetiracetam IVPB  500 mg Intravenous Q12H     methylPREDNISolone sodium succinate  80 mg Intravenous Q12H    Followed by    [START ON 7/26/2018] methylPREDNISolone sodium succinate  60 mg Intravenous Q12H    Followed by    [START ON 7/27/2018] methylPREDNISolone sodium succinate  40 mg Intravenous Q12H    Followed by    [START ON 7/28/2018] methylPREDNISolone sodium succinate  20 mg Intravenous Q12H    Followed by    [START ON 7/29/2018] predniSONE  20 mg Oral Daily    mycophenolate mofetil  1,000 mg Oral BID    pantoprazole 40 mg in dextrose 5 % 100 mL IVPB (over 15 minutes) (ready to mix system)  40 mg Intravenous BID    sertraline  50 mg Oral Daily    sulfamethoxazole-trimethoprim 400-80mg  1 tablet Oral Daily    tacrolimus  1 mg Oral BID    triamcinolone acetonide 0.1%   Topical (Top) BID    zinc sulfate  220 mg Oral BID     Continuous Infusions:   dexmedetomidine (PRECEDEX) infusion Stopped (07/25/18 0400)    insulin (HUMAN R) infusion (adults) 1.5 Units/hr (07/25/18 1700)    norepinephrine bitartrate-D5W Stopped (07/24/18 0900)    vasopressin (PITRESSIN) infusion 0.02 Units/min (07/25/18 0945)     PRN Meds:sodium chloride, sodium chloride, acetaminophen, dextrose 50%, dextrose 50%, diphenhydrAMINE-zinc acetate 1-0.1%, glucagon (human recombinant), glucose, glucose, HYDROmorphone, insulin aspart U-100, metoclopramide HCl, ondansetron, sodium chloride 0.9%    Review of Systems   Unable to perform ROS: Mental status change     Objective:     Vital Signs (Most Recent):  Temp: 97.6 °F (36.4 °C) (07/25/18 1500)  Pulse: (!) 51 (07/25/18 1700)  Resp: 10 (07/25/18 1700)  BP: 129/60 (07/25/18 1700)  SpO2: 99 % (07/25/18 1700) Vital Signs (24h Range):  Temp:  [96.7 °F (35.9 °C)-98.2 °F (36.8 °C)] 97.6 °F (36.4 °C)  Pulse:  [40-81] 51  Resp:  [0-50] 10  SpO2:  [97 %-100 %] 99 %  BP: ()/(51-68) 129/60  Arterial Line BP: ()/(41-59) 135/51     Weight: 122.5 kg (270 lb 1 oz)  Body mass index is 34.67 kg/m².    Intake/Output - Last 3 Shifts        07/23 0700 - 07/24 0659 07/24 0700 - 07/25 0659 07/25 0700 - 07/26 0659    I.V. (mL/kg) 04663 (128.4) 1758.5 (14.4) 722 (5.9)    Blood 2427 287 505    Other 400      NG/GT 25 380     Total Intake(mL/kg) 29465 (151.2) 2425.5 (19.8) 1227 (10)    Urine (mL/kg/hr) 312 (0.1) 20 (0) 32 (0)    Drains 7235 (2.4) 1673 (0.6) 192 (0.1)    Other 451 (0.2)      Total Output 7998 1693 224    Net +16693 +732.5 +1003           Stool Occurrence  1 x 1 x          Physical Exam   Constitutional: He appears well-developed and well-nourished.   HENT:   Head: Normocephalic and atraumatic.   Eyes: No scleral icterus.   Cardiovascular: Normal rate and regular rhythm.    No murmur heard.  Pulmonary/Chest: Effort normal and breath sounds normal. He has no wheezes.   Abdominal: Soft. He exhibits no distension.   Musculoskeletal: He exhibits edema.   Lymphadenopathy:     He has no cervical adenopathy.   Neurological: He is alert.   Skin: Skin is warm and dry. He is not diaphoretic. There is erythema.   Dry flaking skin throughout       Laboratory:  Immunosuppressants         Stop Route Frequency     tacrolimus (PROGRAF) 1 mg/mL oral syringe      -- Oral 2 times daily     mycophenolate mofetil 200 mg/mL suspension 1,000 mg      -- Oral 2 times daily        CBC:     Recent Labs  Lab 07/25/18  0354   WBC 2.51*   RBC 2.45*   HGB 8.0*   HCT 24.1*   PLT 21*   MCV 98   MCH 32.7*   MCHC 33.2     CMP:     Recent Labs  Lab 07/25/18  0354 07/25/18  1401   *  261*  261* 221*  221*   CALCIUM 7.4*  7.4*  7.4* 7.3*  7.3*   ALBUMIN 2.2*  2.2* 2.4*   PROT 3.6*  --      138  138 137  137   K 5.1  5.1  5.1 5.1  5.1   CO2 21*  21*  21* 18*  18*     108  108 106  106   BUN 59*  59*  59* 68*  68*   CREATININE 3.7*  3.7*  3.7* 3.8*  3.8*   ALKPHOS 72  --    ALT 91*  --    AST 77*  --    BILITOT 1.5*  --      Coagulation:     Recent Labs  Lab 07/25/18  0354   INR 1.2   APTT 30.3     ABGs:     Recent Labs  Lab  07/25/18  1147   PH 7.373   PCO2 38.2   HCO3 22.3*   POCSATURATED 100   BE -3     Labs within the past 24 hours have been reviewed.    Diagnostic Results:  US Liver Transplant Post:  No results found for this or any previous visit.  I have personally reviewed all pertinent imaging studies.    Assessment/Plan:   Alon Adamson is a 63 y.o. male     Neuro   Seizures    - Oral Keppra transitioned to IV as pt not able to swallow 6/26.  - Resumed oral Keppra since mentation improved.  - EEG 6/26 negative.        Derm   Cholestatic pruritus    - cholestyramine has previously been ineffective, given depressed mood.   - Zoloft for mood and pruritus.        Cardiac/Vascular   Coronary artery disease involving native coronary artery of native heart without angina pectoris    - Continue home ASA.   - 2D Echo 7/9 normal EF (55-60%), trivial tricuspid/mitral regurg.        Renal/   * Acute kidney injury superimposed on chronic kidney disease    -With CKD3   -VICKY initially improving with albumin infusion but with minimal intake 6/25 and 6/26.  - nephrology consulted due to hypervolemia and multiple electrolyte abnormalities (acidosis, hypernatremia)  - Creatinine continues to worsen, likely to need RRT in the near future if doesn't improve. Discussed with Nephrology. Plan for lasix trial per Nephrology. Give IV Lasix 80 mg. Monitor.         CKD (chronic kidney disease), stage III    See VICKY. KTM consulted to assess need for kidney transplant.             Acidosis    Continue oral bicarb replacement. Monitor with daily labs.           Hematology   Coagulopathy    - Secondary to decompensated liver disease.         Thrombocytopenia    - Secondary to splenomegaly from portal HTN- should improve with txp.  - No s/s overt bleed.        Oncology   Pancytopenia    - related to decompensated liver disease         Anemia of chronic disease    - H&H decreased on 7/18 and one unit of PRBCs given for replacement at that time.   - With  "good response to transfusion.   - H/H stable. Continue to monitor.          Endocrine   Severe protein-calorie malnutrition    - Dietary consulted for calorie count.  - Pt eating better accord to family.  Does take boost supplement.  - "pre-hab" regimen and supplements ordered. Dc creon due to excessive diarrhea  - Patient drinking at least 2-3 boost supplements and beneprotein daily.   - If continues to have low intake, consider starting Megace.           GI   Ascites due to alcoholic cirrhosis    - Para 6/25 negative for infection   - Para 7/10 negative for infection. Monitor.           Decompensated hepatic cirrhosis    - Placed status 7 on 6/26 for prescription drug coverage change. Re-activated 7/3. Back on hold d/t frailty and decompensation at the end of last week prompting ICU transfer. Now off hold 7/17.  Plan to submit MELD exception points. Will remeld today at 27.       MELD-Na score: 27 at 7/21/2018  4:01 AM  MELD score: 27 at 7/21/2018  4:01 AM  Calculated from:  Serum Creatinine: 3.7 mg/dL at 7/21/2018  4:00 AM  Serum Sodium: 147 mmol/L (Rounded to 137) at 7/21/2018  4:00 AM  Total Bilirubin: 1.6 mg/dL at 7/21/2018  4:00 AM  INR(ratio): 1.7 at 7/21/2018  4:01 AM  Age: 63 years        Hepatic encephalopathy    - ESLD secondary to EtOH cirrhosis with severe complications (hyperbilirubinemia, hypoalbuminemia, severe malnutrition, hepatic encephalopathy, thrombocytopenia, splenomegaly, ascites, hypervolemia, coaguloopathy, portal HTN), seizure disorder on Keppra, CKD3, CAD   - has frequent episodes of lethargy/confusion/slowing when holding lactulose   - admitted to SICU 7/13/18 for worsening encephalopathy and hypothermia  - PO lactulose TID, PRN lactulose enemas, Rifaximin  - sleepy this AM. Given lactulose enema with improvement in mentation. Continue oral lactulose and PRN lactulose enemas for HE control.           Orthopedic   Hypothermia    - with episodes of hypothermia on 7/17 and 7/18.   - BS " antibxs on board for prophylaxis.   - blood cultures NGTD.   - ID consulted for further management.           Other   Organ transplant candidate    Neuro:   - alert and oriented to person only  - no sedation, pain control prn     Pulmonary:   - 2L NC, wean as tolerated to maintain sats >95%  - monitor     Cardiac:  - HDS  - vaso 0.04  - CVP 6-8     Renal:   - BUN/Cr 68/3.8  - UOP minimal ~10cc overnight  - follow up nephrology recs for dialysis options     Infectious Disease:   - WBC 2.54  - rocephin for donor positive cultures  - Trend WBC     Hematology/Oncology:  - H/H 8/24  - platelets this AM for drain removal  - Trend CBC     Endocrine:  - insulin gtt 0.9  - monitor     Fluids/Electrolytes/Nutrition/GI:   - NPO, CLD once bed side swallow passed  - Trend CMP  - Replace Lytes PRN     Pain Management::   - dilaudid 0.2 mg prn     Dispo:  Continue SICU care                Physical deconditioning    - PT/OT consulted.    - Encourage OOBTC for several hours each day, walking with walker with PT        Hypoalbuminemia due to protein-calorie malnutrition    - see severe protein-calorie malnutrition         Bilateral edema of lower extremity    - Completed vanc x 7 days for BLE cellulitis  - 2D Echo 7/9 normal EF (55-60%), trivial tricuspid/mitral regurg.  - Vanc now restarted given recent episodes of hypothermia.   -Started trial of lasix per Nephrology.             The patients clinical status was discussed at multidisplinary rounds, involving transplant surgery, transplant medicine, pharmacy, nursing, nutrition, and social work.    Terry Huff MD  Liver Transplant  [facility]

## 2018-07-25 NOTE — PLAN OF CARE
Problem: Patient Care Overview  Goal: Plan of Care Review  Outcome: Ongoing (interventions implemented as appropriate)  Patient resting comfortably on vent/SIMV, PEEP:5, FIO2:40%. SBP>90 maintained with vaso. HR 38-60 sinus becky - HR very sensitive to precedex but patient is not symptomatic/does not drop BP while infusing. Precedex currently off for vent weaning this AM. Urine output 15mL during shift - creatinine and electrolytes increasing - MD aware. Moderate amount of serosanguinous output from CHAS drains - see flowsheets for exact measurements. Insulin infusing - patient requiring sliding scale Q4H with BS>200. Plan to wean to extubate. Patient and family updated regarding plan of care - questions answered. Will continue to monitor.

## 2018-07-25 NOTE — PROGRESS NOTES
[facility]  Critical Care - Surgery  Progress Note    Patient Name: Alon Adamson  MRN: [id number]  Admission Date: 6/22/2018  Hospital Length of Stay: 33 days  Code Status: Full Code  Attending Provider: Williams Moon Jr., MD  Primary Care Provider: Mckinley Vides MD   Principal Problem: Acute kidney injury superimposed on chronic kidney disease    Subjective:     Hospital/ICU Course:  Per tranplant, Patient is listed for liver transplant, placed on internal hold 6/25 for HE then status 7 on 6/26 due to change in prescription coverage. He was re-activated on the list 7/3, MELD 22. Pt started on IV diuretics 6/22 and continued 6/23 and 6/24.  BLE edema and ascites sign if improved with diuresis and kidney function also improved. During admission, patient had worsening encephalopathy and asterixis prompting infectious work up, all blood and urine cultures obtained during admission with NGTD. Intermittently, encephalopathy severe enough to warrant lactulose enemas. CT head obtained and negative. Paracentesis 6/25 negative for peritonitis per cell count and U/A negative. Pt also with concern for BLE cellulitis- upper legs bright red with lower BLE still with dark appearance of venous stasis. Placed on vancomycin with improvement in BLE redness 6/26. Vanc continued for 7 days (ended 7/1). His HE waxed and waned for several days requiring lactulose enemas. Pt eating minimally during period of encephalopathy requiring gentle hydration with IVF 6/26. Of note, taco placement attempted 6/26 but unable secondary to agitation upon insertion prompting self resolving nose bleed. Micro lab called 6/26 PM with blood cx + for G+C in blood- pt continued on vanc which was started 6/25. ID consulted, suspect + blood cultures contaminant. EEG obtained 6/26 as pt with h/o serizures and negative. Pt continued on home Keppra. VICKY on admission initially improved with diuresis, but Cr sakina intermittently during  admission likely related to aggressive diuresis. Of note, with chest pain overnight 6/26-6/27 that resolved without intervention and pt reported as mild.  EKG NSR with PVC, troponin 0.1 in setting of hypervolemia + VICKY.  Normal dobutamine stress 1/2018.  Cardiology consulted and felt not ACS, no need for further workup other than continue to treat current risk factors for CAD with ASA (pt already on ASA and statin as outpt) + BB for portal hypertension which was started. Repeat paracentesis 7/10, 4.8L off, no SBP. Hypernatremia noted 7/9, nephrology consulted to aid in management. Hypernatremia attributed to dehydration from frequent BM's, diuretics were held, d/c'd creon due to excessive diarrhea, and patient was treated with increasing po free water and IVF. Echo 7/9 with normal EF, no wall abnormality, mild tricuspid/mitral regurg.     Overnight from 7/12 to 7/13, pt became hypothermic to 91.2 corrected with warming blankets, experienced worsening encephalopathy, hypotensive to 80/40.  Transferred to SICU.  Corrected with albumin 5% bolus.  Given additional lactulose.    7/14- no acute issues, ammonia level decreased, mentation improving    7/23-OLT  7/24 - unable to tolerate full CRRT due to hypotension, on vasopressin 0.04, unable to wean vent due to mental status, pulm function good, low PLT being monitored, no acute bleed.  AST/ALT improving.    Interval History/Significant Events: NAEON, AFVSS.  Remains intubated because of mental status.  On vaso 0.02.  Did not tolerate CRRT due to hypotension.    Follow-up For: Procedure(s) (LRB):  TRANSPLANT, LIVER (N/A)    Post-Operative Day: 2 Days Post-Op    Objective:     Vital Signs (Most Recent):  Temp: 96.8 °F (36 °C) (07/25/18 0700)  Pulse: (!) 52 (07/25/18 0700)  Resp: 13 (07/25/18 0700)  BP: 128/68 (07/25/18 0700)  SpO2: 100 % (07/25/18 0700) Vital Signs (24h Range):  Temp:  [96.8 °F (36 °C)-98.9 °F (37.2 °C)] 96.8 °F (36 °C)  Pulse:  [40-89] 52  Resp:  [0-32]  13  SpO2:  [98 %-100 %] 100 %  BP: ()/(51-79) 128/68  Arterial Line BP: ()/(41-70) 116/54     Weight: 122.5 kg (270 lb 1 oz)  Body mass index is 34.67 kg/m².      Intake/Output Summary (Last 24 hours) at 07/25/18 0826  Last data filed at 07/25/18 0700   Gross per 24 hour   Intake           2125.5 ml   Output             1679 ml   Net            446.5 ml       Physical Exam   Constitutional: He appears well-developed and well-nourished. No distress.   HENT:   Head: Normocephalic and atraumatic.   Eyes: Pupils are equal, round, and reactive to light.   Neck: Neck supple. No JVD present. No tracheal deviation present.   Cardiovascular: Normal rate, regular rhythm and normal heart sounds.    Pulmonary/Chest: Effort normal and breath sounds normal. No respiratory distress.   intubated   Abdominal: Soft. Bowel sounds are normal. He exhibits no distension. There is no tenderness. There is no guarding.   Incision in upper quadrants, Liver transplant surgery    Neurological:   Unconscious, intubated   Skin: He is not diaphoretic.   Vitals reviewed.      Vents:  Vent Mode: SIMV (07/25/18 0700)  Set Rate: 16 bmp (07/25/18 0700)  Vt Set: 500 mL (07/23/18 1446)  Pressure Support: 10 cmH20 (07/25/18 0700)  PEEP/CPAP: 5 cmH20 (07/25/18 0700)  Oxygen Concentration (%): 40 (07/25/18 0700)  Peak Airway Pressure: 13 cmH2O (07/25/18 0700)  Plateau Pressure: 12 cmH20 (07/25/18 0700)  Total Ve: 8.17 mL (07/25/18 0700)  F/VT Ratio<105 (RSBI): (!) 24.86 (07/25/18 0700)    Lines/Drains/Airways     Central Venous Catheter Line                 Introducer 07/23/18 0500 right internal jugular 2 days         Percutaneous Central Line Insertion/Assessment - double lumen  07/23/18 0500 right internal jugular 2 days         Percutaneous Central Line Insertion/Assessment - double lumen  07/23/18 0512 right femoral 2 days         Percutaneous Central Line Insertion/Assessment - triple lumen  07/23/18 0530 right internal jugular 2 days          Pulmonary Artery Catheter Assessment  07/23/18 0530 2 days          Drain                 Urethral Catheter 07/21/18 1706 3 days         Closed/Suction Drain 07/23/18 Right;Lateral Abdomen Bulb 2 days         Closed/Suction Drain 07/23/18 Right;Medial Abdomen Bulb 2 days         NG/OG Tube 07/23/18 orogastric 2 days          Airway                 Airway - Non-Surgical 07/23/18 0451 Endotracheal Tube 2 days          Arterial Line                 Arterial Line 07/23/18 0500 Right Femoral 2 days          Peripheral Intravenous Line                 Midline Catheter Insertion/Assessment  - Single Lumen 07/20/18 1128 Right cephalic vein (lateral side of arm) 18g x 10cm 4 days         Peripheral IV - Single Lumen 07/23/18 0511 Left Antecubital 2 days                Significant Labs:    CBC/Anemia Profile:    Recent Labs  Lab 07/24/18  1509 07/24/18  1906 07/25/18  0354   WBC 5.81 5.17 2.51*   HGB 8.4* 8.0* 8.0*   HCT 25.6* 23.9* 24.1*   PLT 12* 34* 21*   MCV 98 98 98   RDW 19.4* 18.9* 18.9*        Chemistries:    Recent Labs  Lab 07/23/18  1847  07/24/18  0300  07/24/18  1510 07/24/18  1906 07/24/18  2200 07/25/18  0354     < > 142  142  < > 139  --  139  139 138  138  138   K 4.2  < > 4.2  4.2  < > 4.6  --  4.7  4.7 5.1  5.1  5.1     < > 110  110  < > 109  --  109  109 108  108  108   CO2 16*  < > 23  23  < > 21*  --  21*  21* 21*  21*  21*   BUN 29*  < > 34*  34*  < > 45*  --  51*  51* 59*  59*  59*   CREATININE 2.2*  < > 2.4*  2.4*  < > 3.0*  --  3.2*  3.2* 3.7*  3.7*  3.7*   CALCIUM 8.0*  < > 8.0*  8.0*  < > 7.4*  --  7.1*  7.1* 7.4*  7.4*  7.4*   ALBUMIN 2.6*  --  2.7*  2.7*  --   --   --  2.1* 2.2*  2.2*   PROT 3.7*  --  4.0*  --   --   --   --  3.6*   BILITOT 4.3*  --  2.9*  --   --   --   --  1.5*   ALKPHOS 48*  --  55  --   --   --   --  72   *  --  152*  --   --   --   --  91*   *  < > 220*  220*  < > 121* 98*  --  77*   MG  --   --  2.4  --  2.2  --   2.4 2.5   PHOS  --   --  5.1*  5.1*  --   --   --  6.2* 7.3*  7.3*   < > = values in this interval not displayed.    All pertinent labs within the past 24 hours have been reviewed.    Significant Imaging:  I have reviewed all pertinent imaging results/findings within the past 24 hours.    Assessment/Plan:     Hepatic encephalopathy    63 yr old male with decompensated alcoholic cirrhosis, CKD III and CAD. Hospitalized with HE, VICKY on CKD. S/p OLT on 7/23 with intraop CRRT. Brought to the ICU intubated, not requiring pressor support. Significant blood loss, with aggressive resuscitation intraop    Neuro  -hx seizures and HE  -keppra  -Sedation turned off.    Cardiovascular  - h/o CAD  - 2D Echo 7/9 normal EF (55-60%), trivial tricuspid/mitral regurg  - On minimal vasopressin 0.02.    Respiratory:  -Intubated, minimal vent settings  -monitor daily ABG  -daily SBT, wean to extubate    Renal:  -VICKY on CKD-intraop CRRT, anuric  -Nephrology following, hypotension limiting factor  -[place], BUN/Cr    Liver and Pancreas  -s/p OLT 7/23   -Trend liver enzymes  -PLTs low.    GI  -NPO  -Replete lytes prn       ID  -Anti-rejection, antiretrovirals  -monitor WBC    Heme  -h/h stable, thrombocytopenia. 14 L blood loss intraop.   RBC (Units) 9   FFP (Units) 2   Cryoprecipitate (Units)  2   Platelets (Units) 2   -consider PLT administration for low PLT, no bleeding noted  -some heme output in OG tube, continue to monitor CBC and output    Dispo  -Primary team Transplant Surgery  -Monitor platelets, any bleeding, and other LFTs  -Wean to extubate  -Wean vasopressin  -Wean CRRT to HD  -Continue SICU care           Critical care was time spent personally by me on the following activities: development of treatment plan with patient or surrogate and bedside caregivers, discussions with consultants, evaluation of patient's response to treatment, examination of patient, ordering and performing treatments and interventions, ordering and  review of laboratory studies, ordering and review of radiographic studies, pulse oximetry, re-evaluation of patient's condition.  This critical care time did not overlap with that of any other provider or involve time for any procedures.     Ernesto Fraire MD  Critical Care - Surgery  [facility]

## 2018-07-25 NOTE — PROGRESS NOTES
[facility]  Nephrology  Progress Note    Patient Name: Alon Adamson  MRN: [id number]  Admission Date: 6/22/2018  Hospital Length of Stay: 33 days  Attending Provider: Williams Moon Jr., MD   Primary Care Physician: Mckinley Vides MD  Principal Problem:Acute kidney injury superimposed on chronic kidney disease    Subjective:     HPI: Mr. Adamson is 63 yr old male with decompensated alcoholic cirrhosis, CKD III and CAD admitted here on 06/22/18 admitted for bilateral LE hypervolemia and VICKY on CKD III with cr of 2.3 baseline around 1.5. Patient has multiple hospitalization in the past secondary to decompensated liver failure. Patient is currently on transplant team. During current admission patient was getting lasix and albumin intermittently for VICKY however renal function was not getting better. Patient hospital course complicated by hepatic encephalopathy with some improvement of mentation with lactulose. Also treated for cellulitis with 7 days of vancomycin.Nephrology is consulted for worsening/not improving VICYK despite lasix/albumin.     Per chart review patient has no hypotensive episodes, BP mostly above 100's. Has not received nephrotoxins such as NSAIDs/contrast media. No sever sepsis/septic shock or acute blood loss during current admit. UA with 2+ leukocytes and Hyaline casts, no wbc/rbc cast or proteinuria. Urine Na+ < 20.    Patient was transferred to ICU on 7/13/2018 after being more lethargic and hypoactive.  After two days was stepped down back to Transplant [place].       Interval History: patient is extubated, vs stable, not in acute distress. UOP 20cc.Cr increased 3.7.     Review of patient's allergies indicates:   Allergen Reactions    Nsaids (non-steroidal anti-inflammatory drug)      D/t to liver disease.    Tylenol [acetaminophen]      D/t liver disease.    Penicillins Other (See Comments)     Tolerated pip-tazo in 8/2016 without issue  Tolerated cefepime 7/2018 without  issue  Since childhood was told not to take it     Current Facility-Administered Medications   Medication Frequency    0.9%  NaCl infusion (for blood administration) Q24H PRN    0.9%  NaCl infusion (for blood administration) Q24H PRN    acetaminophen tablet 650 mg Q8H PRN    acyclovir capsule 400 mg BID    ascorbic acid (vitamin C) tablet 1,000 mg Daily    atorvastatin tablet 40 mg Daily    cefTRIAXone (ROCEPHIN) 2 g in dextrose 5 % 50 mL IVPB Q24H    dexmedetomidine (PRECEDEX) 400mcg/100mL 0.9% NaCL infusion Continuous    dextrose 50% injection 12.5 g PRN    dextrose 50% injection 25 g PRN    diphenhydrAMINE-zinc acetate 1-0.1% cream TID PRN    docusate sodium capsule 100 mg Daily    ergocalciferol capsule 50,000 Units Q7 Days    fluconazole tablet 200 mg Daily    glucagon (human recombinant) injection 1 mg PRN    glucose chewable tablet 16 g PRN    glucose chewable tablet 24 g PRN    heparin (porcine) injection 5,000 Units Q8H    HYDROmorphone injection 0.2 mg Q2H PRN    insulin aspart U-100 pen 0-4 Units PRN    insulin regular (Humulin R) 100 Units in sodium chloride 0.9% 100 mL infusion Continuous    levETIRAcetam in NaCl (iso-os) IVPB 500 mg Q12H    methylPREDNISolone sodium succinate injection 80 mg Q12H    Followed by    [START ON 7/26/2018] methylPREDNISolone sodium succinate injection 60 mg Q12H    Followed by    [START ON 7/27/2018] methylPREDNISolone sodium succinate injection 40 mg Q12H    Followed by    [START ON 7/28/2018] methylPREDNISolone sodium succinate injection 20 mg Q12H    Followed by    [START ON 7/29/2018] predniSONE tablet 20 mg Daily    metoclopramide HCl injection 10 mg QID (AC + HS) PRN    mycophenolate mofetil 200 mg/mL suspension 1,000 mg BID    norepinephrine 4 mg in dextrose 5% 250 mL infusion (premix) (titrating) Continuous    ondansetron injection 4 mg Q8H PRN    pantoprazole 40 mg in dextrose 5 % 100 mL IVPB (over 15 minutes) (ready to mix system)  BID    sertraline tablet 50 mg Daily    sodium chloride 0.9% flush 3 mL PRN    sulfamethoxazole-trimethoprim 400-80mg per tablet 1 tablet Daily    tacrolimus (PROGRAF) 1 mg/mL oral syringe BID    triamcinolone acetonide 0.1% cream BID    vasopressin (PITRESSIN) 0.2 Units/mL in dextrose 5 % 100 mL infusion Continuous    zinc sulfate capsule 220 mg BID       Objective:     Vital Signs (Most Recent):  Temp: 97.6 °F (36.4 °C) (07/25/18 1500)  Pulse: (!) 56 (07/25/18 1500)  Resp: 14 (07/25/18 1500)  BP: 135/64 (07/25/18 1500)  SpO2: 98 % (07/25/18 1500)  O2 Device (Oxygen Therapy): nasal cannula (07/25/18 1500) Vital Signs (24h Range):  Temp:  [96.7 °F (35.9 °C)-98.2 °F (36.8 °C)] 97.6 °F (36.4 °C)  Pulse:  [40-89] 56  Resp:  [0-50] 14  SpO2:  [97 %-100 %] 98 %  BP: ()/(51-68) 135/64  Arterial Line BP: ()/(41-61) 133/52     Weight: 122.5 kg (270 lb 1 oz) (07/25/18 0500)  Body mass index is 34.67 kg/m².  Body surface area is 2.53 meters squared.    I/O last 3 completed shifts:  In: 3905.5 [I.V.:3213.5; Blood:287; NG/GT:405]  Out: 3704 [Urine:70; Drains:3634]    Physical Exam   Constitutional: He is oriented to person, place, and time. He appears well-developed and well-nourished. No distress.   HENT:   Head: Normocephalic and atraumatic.   Eyes: Conjunctivae and EOM are normal. Pupils are equal, round, and reactive to light.   Neck: Normal range of motion. Neck supple. No JVD present.   Cardiovascular: Normal rate, regular rhythm, normal heart sounds and intact distal pulses.    Pulmonary/Chest: Breath sounds normal. He is in respiratory distress.   Abdominal: Soft.   Musculoskeletal: He exhibits edema. He exhibits no tenderness or deformity.   Neurological: He is alert and oriented to person, place, and time.   Skin: Skin is warm and dry. He is not diaphoretic.   Nursing note and vitals reviewed.      Significant Labs:  CBC:   Recent Labs  Lab 07/25/18  0354   WBC 2.51*   RBC 2.45*   HGB 8.0*   HCT  24.1*   PLT 21*   MCV 98   MCH 32.7*   MCHC 33.2     CMP:   Recent Labs  Lab 07/25/18  0354 07/25/18  1401   *  261*  261* 221*  221*   CALCIUM 7.4*  7.4*  7.4* 7.3*  7.3*   ALBUMIN 2.2*  2.2* 2.4*   PROT 3.6*  --      138  138 137  137   K 5.1  5.1  5.1 5.1  5.1   CO2 21*  21*  21* 18*  18*     108  108 106  106   BUN 59*  59*  59* 68*  68*   CREATININE 3.7*  3.7*  3.7* 3.8*  3.8*   ALKPHOS 72  --    ALT 91*  --    AST 77*  --    BILITOT 1.5*  --         Significant Imaging:  X-Ray: Reviewed  bibasilar edema    Assessment/Plan:     * Acute kidney injury superimposed on chronic kidney disease    - continue to monitor serial RFPs and strict Is & Os  - Avoid nephrotoxic medications  - Maintain MAP >65  - Hb > 7gm/dL  - Patient still has low side bp reading, will  increase vasopressin dose and try CRRT                     Thank you for your consult. I will follow-up with patient. Please contact us if you have any additional questions.    Chichi Urbina MD  Nephrology  [facility]    ATTENDING PHYSICIAN ATTESTATION  I have personally interviewed and examined the patient. I thoroughly reviewed the demographic, clinical, laboratorial and imaging information available in medical records. I agree with the assessment and recommendations provided by the subspecialty resident. Dr. Urbina was under my supervision.

## 2018-07-25 NOTE — ASSESSMENT & PLAN NOTE
S/p transplanted.  On high dose steroids and immunosuppressants.  Currently on scheduled steroid taper.  Per transplant

## 2018-07-26 PROBLEM — R00.1 SINUS BRADYCARDIA: Status: ACTIVE | Noted: 2018-07-26

## 2018-07-26 LAB
ALBUMIN SERPL BCP-MCNC: 2.3 G/DL
ALBUMIN SERPL BCP-MCNC: 2.4 G/DL
ALLENS TEST: ABNORMAL
ALP SERPL-CCNC: 112 U/L
ALP SERPL-CCNC: 115 U/L
ALT SERPL W/O P-5'-P-CCNC: 69 U/L
ALT SERPL W/O P-5'-P-CCNC: 79 U/L
ANION GAP SERPL CALC-SCNC: 6 MMOL/L
ANION GAP SERPL CALC-SCNC: 6 MMOL/L
ANION GAP SERPL CALC-SCNC: 7 MMOL/L
ANION GAP SERPL CALC-SCNC: 9 MMOL/L
ANION GAP SERPL CALC-SCNC: 9 MMOL/L
ANISOCYTOSIS BLD QL SMEAR: SLIGHT
APTT BLDCRRT: 34.8 SEC
AST SERPL-CCNC: 50 U/L
AST SERPL-CCNC: 59 U/L
BACTERIA BLD CULT: NORMAL
BACTERIA BLD CULT: NORMAL
BACTERIA SPEC AEROBE CULT: NO GROWTH
BASO STIPL BLD QL SMEAR: ABNORMAL
BASOPHILS # BLD AUTO: 0 K/UL
BASOPHILS NFR BLD: 0 %
BILIRUB DIRECT SERPL-MCNC: 0.8 MG/DL
BILIRUB SERPL-MCNC: 1.1 MG/DL
BILIRUB SERPL-MCNC: 1.4 MG/DL
BLD PROD TYP BPU: NORMAL
BLOOD UNIT EXPIRATION DATE: NORMAL
BLOOD UNIT TYPE CODE: 5100
BLOOD UNIT TYPE CODE: 6200
BLOOD UNIT TYPE: NORMAL
BUN SERPL-MCNC: 13 MG/DL
BUN SERPL-MCNC: 19 MG/DL
BUN SERPL-MCNC: 31 MG/DL
BUN SERPL-MCNC: 37 MG/DL
BUN SERPL-MCNC: 37 MG/DL
CALCIUM SERPL-MCNC: 7 MG/DL
CALCIUM SERPL-MCNC: 7.3 MG/DL
CALCIUM SERPL-MCNC: 7.5 MG/DL
CHLORIDE SERPL-SCNC: 103 MMOL/L
CHLORIDE SERPL-SCNC: 103 MMOL/L
CHLORIDE SERPL-SCNC: 104 MMOL/L
CO2 SERPL-SCNC: 26 MMOL/L
CO2 SERPL-SCNC: 26 MMOL/L
CO2 SERPL-SCNC: 28 MMOL/L
CO2 SERPL-SCNC: 29 MMOL/L
CO2 SERPL-SCNC: 29 MMOL/L
CODING SYSTEM: NORMAL
CREAT SERPL-MCNC: 1 MG/DL
CREAT SERPL-MCNC: 1.4 MG/DL
CREAT SERPL-MCNC: 2.1 MG/DL
CREAT SERPL-MCNC: 2.4 MG/DL
CREAT SERPL-MCNC: 2.4 MG/DL
DIFFERENTIAL METHOD: ABNORMAL
DISPENSE STATUS: NORMAL
EOSINOPHIL # BLD AUTO: 0 K/UL
EOSINOPHIL NFR BLD: 0 %
ERYTHROCYTE [DISTWIDTH] IN BLOOD BY AUTOMATED COUNT: 17.5 %
ERYTHROCYTE [DISTWIDTH] IN BLOOD BY AUTOMATED COUNT: 17.8 %
ERYTHROCYTE [DISTWIDTH] IN BLOOD BY AUTOMATED COUNT: 18.2 %
EST. GFR  (AFRICAN AMERICAN): 32.2 ML/MIN/1.73 M^2
EST. GFR  (AFRICAN AMERICAN): 32.2 ML/MIN/1.73 M^2
EST. GFR  (AFRICAN AMERICAN): 37.9 ML/MIN/1.73 M^2
EST. GFR  (AFRICAN AMERICAN): >60 ML/MIN/1.73 M^2
EST. GFR  (AFRICAN AMERICAN): >60 ML/MIN/1.73 M^2
EST. GFR  (NON AFRICAN AMERICAN): 27.9 ML/MIN/1.73 M^2
EST. GFR  (NON AFRICAN AMERICAN): 27.9 ML/MIN/1.73 M^2
EST. GFR  (NON AFRICAN AMERICAN): 32.7 ML/MIN/1.73 M^2
EST. GFR  (NON AFRICAN AMERICAN): 53.5 ML/MIN/1.73 M^2
EST. GFR  (NON AFRICAN AMERICAN): >60 ML/MIN/1.73 M^2
GGT SERPL-CCNC: 106 U/L
GLUCOSE SERPL-MCNC: 127 MG/DL
GLUCOSE SERPL-MCNC: 133 MG/DL
GLUCOSE SERPL-MCNC: 133 MG/DL
GLUCOSE SERPL-MCNC: 139 MG/DL
GLUCOSE SERPL-MCNC: 156 MG/DL
HCO3 UR-SCNC: 28.9 MMOL/L (ref 24–28)
HCT VFR BLD AUTO: 22.2 %
HCT VFR BLD AUTO: 22.7 %
HCT VFR BLD AUTO: 25.1 %
HGB BLD-MCNC: 7.3 G/DL
HGB BLD-MCNC: 7.4 G/DL
HGB BLD-MCNC: 8.3 G/DL
HYPOCHROMIA BLD QL SMEAR: ABNORMAL
IMM GRANULOCYTES # BLD AUTO: 0.01 K/UL
IMM GRANULOCYTES # BLD AUTO: 0.02 K/UL
IMM GRANULOCYTES # BLD AUTO: 0.03 K/UL
IMM GRANULOCYTES NFR BLD AUTO: 0.2 %
IMM GRANULOCYTES NFR BLD AUTO: 0.5 %
IMM GRANULOCYTES NFR BLD AUTO: 0.6 %
INR PPP: 1.3
LYMPHOCYTES # BLD AUTO: 0.3 K/UL
LYMPHOCYTES # BLD AUTO: 0.4 K/UL
LYMPHOCYTES # BLD AUTO: 0.4 K/UL
LYMPHOCYTES NFR BLD: 6.8 %
LYMPHOCYTES NFR BLD: 7.4 %
LYMPHOCYTES NFR BLD: 9.8 %
MAGNESIUM SERPL-MCNC: 1.7 MG/DL
MAGNESIUM SERPL-MCNC: 2.1 MG/DL
MCH RBC QN AUTO: 31.5 PG
MCH RBC QN AUTO: 31.9 PG
MCH RBC QN AUTO: 32.5 PG
MCHC RBC AUTO-ENTMCNC: 32.2 G/DL
MCHC RBC AUTO-ENTMCNC: 33.1 G/DL
MCHC RBC AUTO-ENTMCNC: 33.3 G/DL
MCV RBC AUTO: 97 FL
MCV RBC AUTO: 97 FL
MCV RBC AUTO: 98 FL
MONOCYTES # BLD AUTO: 0.4 K/UL
MONOCYTES NFR BLD: 10.5 %
MONOCYTES NFR BLD: 7.8 %
MONOCYTES NFR BLD: 9.6 %
NEUTROPHILS # BLD AUTO: 3.2 K/UL
NEUTROPHILS # BLD AUTO: 3.4 K/UL
NEUTROPHILS # BLD AUTO: 4.5 K/UL
NEUTROPHILS NFR BLD: 80.4 %
NEUTROPHILS NFR BLD: 81.6 %
NEUTROPHILS NFR BLD: 84.8 %
NRBC BLD-RTO: 0 /100 WBC
PCO2 BLDA: 39.3 MMHG (ref 35–45)
PH SMN: 7.47 [PH] (ref 7.35–7.45)
PHOSPHATE SERPL-MCNC: 3.3 MG/DL
PHOSPHATE SERPL-MCNC: 4.1 MG/DL
PHOSPHATE SERPL-MCNC: 5.4 MG/DL
PHOSPHATE SERPL-MCNC: 5.4 MG/DL
PLATELET # BLD AUTO: 31 K/UL
PLATELET # BLD AUTO: 46 K/UL
PLATELET # BLD AUTO: 47 K/UL
PLATELET BLD QL SMEAR: ABNORMAL
PMV BLD AUTO: 10.9 FL
PMV BLD AUTO: 11.6 FL
PMV BLD AUTO: 12 FL
PO2 BLDA: 31 MMHG (ref 40–60)
POC BE: 5 MMOL/L
POC SATURATED O2: 65 % (ref 95–100)
POC TCO2: 30 MMOL/L (ref 24–29)
POCT GLUCOSE: 128 MG/DL (ref 70–110)
POCT GLUCOSE: 139 MG/DL (ref 70–110)
POCT GLUCOSE: 153 MG/DL (ref 70–110)
POCT GLUCOSE: 161 MG/DL (ref 70–110)
POCT GLUCOSE: 175 MG/DL (ref 70–110)
POCT GLUCOSE: 176 MG/DL (ref 70–110)
POCT GLUCOSE: 89 MG/DL (ref 70–110)
POIKILOCYTOSIS BLD QL SMEAR: SLIGHT
POLYCHROMASIA BLD QL SMEAR: ABNORMAL
POTASSIUM SERPL-SCNC: 4.3 MMOL/L
POTASSIUM SERPL-SCNC: 4.4 MMOL/L
PROT SERPL-MCNC: 4 G/DL
PROT SERPL-MCNC: 4 G/DL
PROTHROMBIN TIME: 12.7 SEC
RBC # BLD AUTO: 2.28 M/UL
RBC # BLD AUTO: 2.32 M/UL
RBC # BLD AUTO: 2.6 M/UL
SAMPLE: ABNORMAL
SCHISTOCYTES BLD QL SMEAR: ABNORMAL
SCHISTOCYTES BLD QL SMEAR: PRESENT
SITE: ABNORMAL
SODIUM SERPL-SCNC: 138 MMOL/L
SODIUM SERPL-SCNC: 138 MMOL/L
SODIUM SERPL-SCNC: 139 MMOL/L
SPHEROCYTES BLD QL SMEAR: ABNORMAL
TACROLIMUS BLD-MCNC: 7 NG/ML
TARGETS BLD QL SMEAR: ABNORMAL
TRANS ERYTHROCYTES VOL PATIENT: NORMAL ML
TRANS PLATPHERESIS VOL PATIENT: NORMAL ML
TRANS PLATPHERESIS VOL PATIENT: NORMAL ML
WBC # BLD AUTO: 3.91 K/UL
WBC # BLD AUTO: 4.18 K/UL
WBC # BLD AUTO: 5.26 K/UL

## 2018-07-26 PROCEDURE — 93005 ELECTROCARDIOGRAM TRACING: CPT

## 2018-07-26 PROCEDURE — 82248 BILIRUBIN DIRECT: CPT

## 2018-07-26 PROCEDURE — 99222 1ST HOSP IP/OBS MODERATE 55: CPT | Mod: ,,, | Performed by: INTERNAL MEDICINE

## 2018-07-26 PROCEDURE — 80053 COMPREHEN METABOLIC PANEL: CPT | Mod: 91

## 2018-07-26 PROCEDURE — 99232 SBSQ HOSP IP/OBS MODERATE 35: CPT | Mod: GC,,, | Performed by: INTERNAL MEDICINE

## 2018-07-26 PROCEDURE — 85610 PROTHROMBIN TIME: CPT

## 2018-07-26 PROCEDURE — 25000003 PHARM REV CODE 250

## 2018-07-26 PROCEDURE — 25000003 PHARM REV CODE 250: Performed by: STUDENT IN AN ORGANIZED HEALTH CARE EDUCATION/TRAINING PROGRAM

## 2018-07-26 PROCEDURE — 82977 ASSAY OF GGT: CPT

## 2018-07-26 PROCEDURE — P9035 PLATELET PHERES LEUKOREDUCED: HCPCS

## 2018-07-26 PROCEDURE — 36620 INSERTION CATHETER ARTERY: CPT | Mod: 78,,, | Performed by: TRANSPLANT SURGERY

## 2018-07-26 PROCEDURE — 80069 RENAL FUNCTION PANEL: CPT

## 2018-07-26 PROCEDURE — 27000221 HC OXYGEN, UP TO 24 HOURS

## 2018-07-26 PROCEDURE — 25000003 PHARM REV CODE 250: Performed by: PEDIATRICS

## 2018-07-26 PROCEDURE — 99233 SBSQ HOSP IP/OBS HIGH 50: CPT | Mod: ,,, | Performed by: ANESTHESIOLOGY

## 2018-07-26 PROCEDURE — 94799 UNLISTED PULMONARY SVC/PX: CPT

## 2018-07-26 PROCEDURE — 63600175 PHARM REV CODE 636 W HCPCS: Performed by: STUDENT IN AN ORGANIZED HEALTH CARE EDUCATION/TRAINING PROGRAM

## 2018-07-26 PROCEDURE — 20000000 HC ICU ROOM

## 2018-07-26 PROCEDURE — 25000003 PHARM REV CODE 250: Performed by: TRANSPLANT SURGERY

## 2018-07-26 PROCEDURE — 80197 ASSAY OF TACROLIMUS: CPT

## 2018-07-26 PROCEDURE — 63600175 PHARM REV CODE 636 W HCPCS: Performed by: PEDIATRICS

## 2018-07-26 PROCEDURE — 25000003 PHARM REV CODE 250: Performed by: INTERNAL MEDICINE

## 2018-07-26 PROCEDURE — 94761 N-INVAS EAR/PLS OXIMETRY MLT: CPT

## 2018-07-26 PROCEDURE — 84100 ASSAY OF PHOSPHORUS: CPT

## 2018-07-26 PROCEDURE — 80053 COMPREHEN METABOLIC PANEL: CPT

## 2018-07-26 PROCEDURE — 93010 ELECTROCARDIOGRAM REPORT: CPT | Mod: ,,, | Performed by: INTERNAL MEDICINE

## 2018-07-26 PROCEDURE — 25000003 PHARM REV CODE 250: Performed by: PHYSICIAN ASSISTANT

## 2018-07-26 PROCEDURE — 90945 DIALYSIS ONE EVALUATION: CPT | Mod: ,,, | Performed by: INTERNAL MEDICINE

## 2018-07-26 PROCEDURE — 83735 ASSAY OF MAGNESIUM: CPT

## 2018-07-26 PROCEDURE — 90945 DIALYSIS ONE EVALUATION: CPT

## 2018-07-26 PROCEDURE — 80069 RENAL FUNCTION PANEL: CPT | Mod: 91

## 2018-07-26 PROCEDURE — 83735 ASSAY OF MAGNESIUM: CPT | Mod: 91

## 2018-07-26 PROCEDURE — 82803 BLOOD GASES ANY COMBINATION: CPT

## 2018-07-26 PROCEDURE — 85730 THROMBOPLASTIN TIME PARTIAL: CPT

## 2018-07-26 PROCEDURE — 80048 BASIC METABOLIC PNL TOTAL CA: CPT

## 2018-07-26 PROCEDURE — 85025 COMPLETE CBC W/AUTO DIFF WBC: CPT | Mod: 91

## 2018-07-26 PROCEDURE — 36592 COLLECT BLOOD FROM PICC: CPT

## 2018-07-26 PROCEDURE — 80100008 HC CRRT DAILY MAINTENANCE

## 2018-07-26 PROCEDURE — 63600175 PHARM REV CODE 636 W HCPCS: Performed by: INTERNAL MEDICINE

## 2018-07-26 PROCEDURE — C9113 INJ PANTOPRAZOLE SODIUM, VIA: HCPCS | Performed by: STUDENT IN AN ORGANIZED HEALTH CARE EDUCATION/TRAINING PROGRAM

## 2018-07-26 RX ORDER — OXYCODONE HYDROCHLORIDE 5 MG/1
10 TABLET ORAL EVERY 6 HOURS PRN
Status: DISCONTINUED | OUTPATIENT
Start: 2018-07-26 | End: 2018-08-07

## 2018-07-26 RX ORDER — GLUCAGON 1 MG
1 KIT INJECTION
Status: DISCONTINUED | OUTPATIENT
Start: 2018-07-26 | End: 2018-07-27

## 2018-07-26 RX ORDER — IBUPROFEN 200 MG
24 TABLET ORAL
Status: DISCONTINUED | OUTPATIENT
Start: 2018-07-26 | End: 2018-07-27

## 2018-07-26 RX ORDER — IBUPROFEN 200 MG
16 TABLET ORAL
Status: DISCONTINUED | OUTPATIENT
Start: 2018-07-26 | End: 2018-07-27

## 2018-07-26 RX ORDER — MAGNESIUM SULFATE HEPTAHYDRATE 40 MG/ML
2 INJECTION, SOLUTION INTRAVENOUS
Status: DISCONTINUED | OUTPATIENT
Start: 2018-07-26 | End: 2018-07-27

## 2018-07-26 RX ORDER — HYDROCODONE BITARTRATE AND ACETAMINOPHEN 500; 5 MG/1; MG/1
TABLET ORAL CONTINUOUS
Status: DISCONTINUED | OUTPATIENT
Start: 2018-07-26 | End: 2018-07-27

## 2018-07-26 RX ORDER — HYDROCODONE BITARTRATE AND ACETAMINOPHEN 500; 5 MG/1; MG/1
TABLET ORAL
Status: DISCONTINUED | OUTPATIENT
Start: 2018-07-26 | End: 2018-07-28

## 2018-07-26 RX ORDER — LIDOCAINE HYDROCHLORIDE 10 MG/ML
INJECTION INFILTRATION; PERINEURAL
Status: COMPLETED
Start: 2018-07-26 | End: 2018-07-26

## 2018-07-26 RX ORDER — OXYCODONE HYDROCHLORIDE 5 MG/1
5 TABLET ORAL EVERY 6 HOURS PRN
Status: DISCONTINUED | OUTPATIENT
Start: 2018-07-26 | End: 2018-08-07

## 2018-07-26 RX ORDER — HEPARIN SODIUM 5000 [USP'U]/ML
5000 INJECTION, SOLUTION INTRAVENOUS; SUBCUTANEOUS EVERY 8 HOURS
Status: DISCONTINUED | OUTPATIENT
Start: 2018-07-26 | End: 2018-08-17 | Stop reason: HOSPADM

## 2018-07-26 RX ORDER — ATROPINE SULFATE 0.1 MG/ML
INJECTION INTRAVENOUS
Status: DISPENSED
Start: 2018-07-26 | End: 2018-07-27

## 2018-07-26 RX ORDER — LIDOCAINE HYDROCHLORIDE 10 MG/ML
1 INJECTION, SOLUTION EPIDURAL; INFILTRATION; INTRACAUDAL; PERINEURAL ONCE
Status: DISCONTINUED | OUTPATIENT
Start: 2018-07-26 | End: 2018-07-28

## 2018-07-26 RX ADMIN — Medication 1 MG: at 09:07

## 2018-07-26 RX ADMIN — Medication 1 MG: at 05:07

## 2018-07-26 RX ADMIN — DEXTROSE 40 MG: 50 INJECTION, SOLUTION INTRAVENOUS at 10:07

## 2018-07-26 RX ADMIN — TRIAMCINOLONE ACETONIDE: 1 CREAM TOPICAL at 09:07

## 2018-07-26 RX ADMIN — ERGOCALCIFEROL 50000 UNITS: 1.25 CAPSULE ORAL at 09:07

## 2018-07-26 RX ADMIN — ATORVASTATIN CALCIUM 40 MG: 20 TABLET, FILM COATED ORAL at 09:07

## 2018-07-26 RX ADMIN — FLUCONAZOLE 200 MG: 200 TABLET ORAL at 09:07

## 2018-07-26 RX ADMIN — LIDOCAINE HYDROCHLORIDE 20 MG: 10 INJECTION, SOLUTION INFILTRATION; PERINEURAL at 03:07

## 2018-07-26 RX ADMIN — Medication 220 MG: at 09:07

## 2018-07-26 RX ADMIN — ACYCLOVIR 400 MG: 200 CAPSULE ORAL at 09:07

## 2018-07-26 RX ADMIN — DEXTROSE 40 MG: 50 INJECTION, SOLUTION INTRAVENOUS at 09:07

## 2018-07-26 RX ADMIN — SERTRALINE HYDROCHLORIDE 50 MG: 50 TABLET ORAL at 09:07

## 2018-07-26 RX ADMIN — METHYLPREDNISOLONE SODIUM SUCCINATE 60 MG: 125 INJECTION, POWDER, FOR SOLUTION INTRAMUSCULAR; INTRAVENOUS at 09:07

## 2018-07-26 RX ADMIN — HEPARIN SODIUM 5000 UNITS: 5000 INJECTION, SOLUTION INTRAVENOUS; SUBCUTANEOUS at 06:07

## 2018-07-26 RX ADMIN — OXYCODONE HYDROCHLORIDE AND ACETAMINOPHEN 1000 MG: 500 TABLET ORAL at 09:07

## 2018-07-26 RX ADMIN — Medication 1000 MG: at 09:07

## 2018-07-26 RX ADMIN — SULFAMETHOXAZOLE AND TRIMETHOPRIM 1 TABLET: 400; 80 TABLET ORAL at 09:07

## 2018-07-26 RX ADMIN — LEVETIRACETAM 500 MG: 5 INJECTION INTRAVENOUS at 09:07

## 2018-07-26 RX ADMIN — TRIAMCINOLONE ACETONIDE: 1 CREAM TOPICAL at 10:07

## 2018-07-26 RX ADMIN — DOCUSATE SODIUM 100 MG: 100 CAPSULE, LIQUID FILLED ORAL at 09:07

## 2018-07-26 RX ADMIN — CEFTRIAXONE SODIUM 2 G: 2 INJECTION, POWDER, FOR SOLUTION INTRAMUSCULAR; INTRAVENOUS at 05:07

## 2018-07-26 RX ADMIN — LEVETIRACETAM 500 MG: 5 INJECTION INTRAVENOUS at 10:07

## 2018-07-26 NOTE — SUBJECTIVE & OBJECTIVE
Interval History:  Extubated yesterday and weaned off of vasopressin. Tolerated CRRT overnight. Much more alert and oriented this morning.     Scheduled Meds:   acyclovir  400 mg Oral BID    ascorbic acid (vitamin C)  1,000 mg Oral Daily    atorvastatin  40 mg Oral Daily    cefTRIAXone (ROCEPHIN) IVPB  2 g Intravenous Q24H    docusate sodium  100 mg Oral Daily    ergocalciferol  50,000 Units Oral Q7 Days    fluconazole  200 mg Oral Daily    heparin (porcine)  5,000 Units Subcutaneous Q8H    levetiracetam IVPB  500 mg Intravenous Q12H    methylPREDNISolone sodium succinate  60 mg Intravenous Q12H    Followed by    [START ON 7/27/2018] methylPREDNISolone sodium succinate  40 mg Intravenous Q12H    Followed by    [START ON 7/28/2018] methylPREDNISolone sodium succinate  20 mg Intravenous Q12H    Followed by    [START ON 7/29/2018] predniSONE  20 mg Oral Daily    mycophenolate mofetil  1,000 mg Oral BID    pantoprazole 40 mg in dextrose 5 % 100 mL IVPB (over 15 minutes) (ready to mix system)  40 mg Intravenous BID    sertraline  50 mg Oral Daily    sulfamethoxazole-trimethoprim 400-80mg  1 tablet Oral Daily    tacrolimus  1 mg Oral BID    triamcinolone acetonide 0.1%   Topical (Top) BID    zinc sulfate  220 mg Oral BID     Continuous Infusions:   sodium chloride 0.9% 200 mL/hr at 07/26/18 1300    insulin (HUMAN R) infusion (adults) Stopped (07/26/18 1000)     PRN Meds:sodium chloride, sodium chloride, dextrose 50%, dextrose 50%, diphenhydrAMINE-zinc acetate 1-0.1%, glucagon (human recombinant), glucose, glucose, insulin aspart U-100, magnesium sulfate IVPB, metoclopramide HCl, ondansetron, oxyCODONE, oxyCODONE, sodium chloride 0.9%, sodium phosphate IVPB, sodium phosphate IVPB, sodium phosphate IVPB    Review of Systems   HENT: Negative.    Respiratory: Negative.    Cardiovascular: Negative.    Gastrointestinal: Positive for abdominal pain.   Genitourinary: Negative.    Musculoskeletal: Positive  for back pain and joint swelling.   Skin: Positive for rash and wound.   Neurological: Negative.    Psychiatric/Behavioral: Positive for confusion.     Objective:     Vital Signs (Most Recent):  Temp: 98.4 °F (36.9 °C) (07/26/18 1200)  Pulse: (!) 44 (07/26/18 1300)  Resp: 14 (07/26/18 1300)  BP: (!) 105/51 (07/26/18 1300)  SpO2: 100 % (07/26/18 1300) Vital Signs (24h Range):  Temp:  [97.6 °F (36.4 °C)-98.4 °F (36.9 °C)] 98.4 °F (36.9 °C)  Pulse:  [38-68] 44  Resp:  [9-43] 14  SpO2:  [95 %-100 %] 100 %  BP: (101-139)/(51-66) 105/51  Arterial Line BP: ()/(38-55) 111/43     Weight: 130 kg (286 lb 9.6 oz)  Body mass index is 36.8 kg/m².    Intake/Output - Last 3 Shifts       07/24 0700 - 07/25 0659 07/25 0700 - 07/26 0659 07/26 0700 - 07/27 0659    P.O.   240    I.V. (mL/kg) 1758.5 (14.4) 1023 (7.9)     Blood 287 505 228    NG/      Total Intake(mL/kg) 2425.5 (19.8) 1528 (11.8) 468 (3.6)    Urine (mL/kg/hr) 20 (0) 102 (0) 30 (0)    Drains 1673 (0.6) 242 (0.1) 50 (0.1)    Other  2644 (0.8) 1749 (1.9)    Total Output 1693 2988 1829    Net +732.5 -1460 -1361           Stool Occurrence 1 x 2 x           Physical Exam   Constitutional: He is oriented to person, place, and time. He appears well-developed and well-nourished.   HENT:   Head: Normocephalic and atraumatic.   Eyes: No scleral icterus.   Cardiovascular: Normal rate and regular rhythm.    No murmur heard.  Pulmonary/Chest: Effort normal and breath sounds normal. He has no wheezes.   Abdominal: Soft. He exhibits no distension.   Abdominal incision c/d/i with staples in place   Musculoskeletal: He exhibits edema.   Lymphadenopathy:     He has no cervical adenopathy.   Neurological: He is alert and oriented to person, place, and time.   Skin: Skin is warm and dry. He is not diaphoretic. There is erythema.   Dry flaking skin throughout       Laboratory:  Immunosuppressants         Stop Route Frequency     tacrolimus (PROGRAF) 1 mg/mL oral syringe      --  Oral 2 times daily     mycophenolate mofetil 200 mg/mL suspension 1,000 mg      -- Oral 2 times daily        CBC:   Recent Labs  Lab 07/26/18  0303   WBC 5.26   RBC 2.60*   HGB 8.3*   HCT 25.1*   PLT 31*   MCV 97   MCH 31.9*   MCHC 33.1     CMP:   Recent Labs  Lab 07/26/18  0303 07/26/18  0611   *  133* 127*   CALCIUM 7.5*  7.5* 7.5*   ALBUMIN 2.3*  2.3*  --    PROT 4.0*  --      139 139   K 4.3  4.3 4.3   CO2 26  26 28     104 104   BUN 37*  37* 31*   CREATININE 2.4*  2.4* 2.1*   ALKPHOS 112  --    ALT 79*  --    AST 59*  --    BILITOT 1.1*  --      Coagulation:   Recent Labs  Lab 07/26/18  0303   INR 1.3*   APTT 34.8*     ABGs:   Recent Labs  Lab 07/26/18  0521   PH 7.474*   PCO2 39.3   HCO3 28.9*   POCSATURATED 65*   BE 5     Labs within the past 24 hours have been reviewed.    Diagnostic Results:  I have personally reviewed all pertinent imaging studies.

## 2018-07-26 NOTE — CONSULTS
[facility]  Cardiology  Consult Note    Patient Name: Alon Adamson  MRN: [id number]  Admission Date: 6/22/2018  Hospital Length of Stay: 34 days  Code Status: Full Code   Attending Provider: Williams Moon Jr., MD   Consulting Provider: Cristina Duffy MD  Primary Care Physician: Mckinley Vides MD  Principal Problem:Liver transplanted    Patient information was obtained from patient, spouse/SO, relative(s) and past medical records.     Inpatient consult to Cardiology  Consult performed by: CRISTINA DUFFY  Consult ordered by: ROBYN RASHID        Subjective:     Chief Complaint:  Bradycardia    HPI:   Alon Adamson is a 63 y.o. male with a PMHx of ESLD secondary to EtOH cirrhosis (s/p liver transplant), CAD (s/p PCI to dLAD and oPDA in 3/2017), HTN, CKD3 and seizure disorder (on Keppra).  Pt was admitted to the hospital on 6/22/18 and has had prolonged stay prior to transplant, mostly due to hepatic encephalopathy. Patient is now s/p orthotopic liver transplant on 7/23.  He underwent intra-op CRRT.  Received 9 units PRBC, 2 FFP, 2 cryo, 2 platelets, 4.4 L cell saver. Brought to the ICU intubated and weaned off pressors.  Yesterday morning (7/25), pt was extubated and weaned of Precedex.    Today (7/26), pt was bradycardic on the monitor (HR down to 30s).  EKG showed possible junctional bradycardia.  Pt has remained bradycardic since with HR mostly in 40s.  He has been asymptomatic during this time, and without any episodes of hypotension.     Pt denies any chest pain/discomfort, SOB, nausea/vomiting, lightheadedness or diaphoresis.  Family at bedside states pt has been doing well since extubation with improvement in functional and mental status.    Cardiology has been consulted for management recommendations of possible bradyarrhythmia in postoperative setting.            Past Medical History:   Diagnosis Date    Anticoagulant long-term use     Arthritis     Back pain     Cellulitis      Cirrhosis     Coronary artery disease     DM (diabetes mellitus)     Hearing loss     right ear    Hepatic encephalopathy     Hypertension     diagnosed today (11/25/2015) and prescribed toprol    Leg edema     Nephrolithiasis     JACK (obstructive sleep apnea)     Seizures     Splenomegaly        Past Surgical History:   Procedure Laterality Date    APPENDECTOMY      Open    BACK SURGERY      CHOLECYSTECTOMY      laprascopic    COLONOSCOPY N/A 1/25/2018    Procedure: COLONOSCOPY;  Surgeon: Lashawn Myles MD;  Location: [facility] (2ND FLR);  Service: Endoscopy;  Laterality: N/A;    LIVER TRANSPLANT N/A 7/23/2018    Procedure: TRANSPLANT, LIVER;  Surgeon: Alma Joyce MD;  [facility];  Service: Transplant;  Laterality: N/A;    LUMBAR DISCECTOMY      SPLENIC ARTERY EMBOLIZATION  04/08/2016    Dr Taveras    TONSILLECTOMY         Review of patient's allergies indicates:   Allergen Reactions    Nsaids (non-steroidal anti-inflammatory drug)      D/t to liver disease.    Tylenol [acetaminophen]      D/t liver disease.    Penicillins Other (See Comments)     Tolerated pip-tazo in 8/2016 without issue  Tolerated cefepime 7/2018 without issue  Since childhood was told not to take it       No current facility-administered medications on file prior to encounter.      Current Outpatient Prescriptions on File Prior to Encounter   Medication Sig    ascorbic acid (VITAMIN C) 1000 MG tablet Take 1,000 mg by mouth once daily.    aspirin (ECOTRIN) 81 MG EC tablet Take 1 tablet (81 mg total) by mouth once daily.    atorvastatin (LIPITOR) 40 MG tablet TAKE 1 TABLET(40 MG) BY MOUTH EVERY DAY    ferrous gluconate 270 mg (27 mg iron) Tab Take 1 tablet by mouth once daily.    fish oil-omega-3 fatty acids 300-1,000 mg capsule Take 2 capsules (2 g total) by mouth once daily. (Patient taking differently: Take 2 g by mouth 2 (two) times daily. )    furosemide (LASIX) 80 MG tablet Take 1 tablet  (80 mg total) by mouth 2 (two) times daily.    GENERLAC 10 gram/15 mL solution Take 60 mLs (40 g total) by mouth 3 (three) times daily. 60mg three times per day (Patient taking differently: Take 60 mLs by mouth 3 (three) times daily. )    levETIRAcetam (KEPPRA) 500 MG Tab Take 1 tablet (500 mg total) by mouth 2 (two) times daily.    magnesium oxide (MAG-OX) 400 mg tablet Take 1 tablet (400 mg total) by mouth once daily.    ondansetron (ZOFRAN) 4 MG tablet Take 1 tablet (4 mg total) by mouth every 8 (eight) hours as needed for Nausea.    pantoprazole (PROTONIX) 40 MG tablet Take 1 tablet (40 mg total) by mouth 2 (two) times daily before meals.    rifAXIMin (XIFAXAN) 550 mg Tab Take 1 tablet (550 mg total) by mouth 2 (two) times daily.    sodium bicarbonate 650 MG tablet Take 1 tablet (650 mg total) by mouth 2 (two) times daily.    spironolactone (ALDACTONE) 100 MG tablet Take 2 tablets (200 mg total) by mouth 2 (two) times daily.    tramadol (ULTRAM) 50 mg tablet Take 50 mg by mouth every 6 (six) hours as needed for Pain.     Family History     None        Social History Main Topics    Smoking status: Never Smoker    Smokeless tobacco: Current User     Types: Chew    Alcohol use No    Drug use: No    Sexual activity: Not on file     Review of Systems   Constitution: Positive for malaise/fatigue. Negative for chills, diaphoresis and fever.   Cardiovascular: Positive for leg swelling. Negative for chest pain, irregular heartbeat, near-syncope, palpitations and syncope.   Gastrointestinal: Negative for abdominal pain, constipation, diarrhea, nausea and vomiting.   Genitourinary: Negative for dysuria and hematuria.   Neurological: Negative for focal weakness and light-headedness.     Objective:     Vital Signs (Most Recent):  Temp: 98.2 °F (36.8 °C) (07/26/18 1500)  Pulse: (!) 41 (07/26/18 1635)  Resp: 14 (07/26/18 1635)  BP: (!) 105/52 (07/26/18 1635)  SpO2: 100 % (07/26/18 1635) Vital Signs (24h  Range):  Temp:  [97.6 °F (36.4 °C)-98.4 °F (36.9 °C)] 98.2 °F (36.8 °C)  Pulse:  [38-68] 41  Resp:  [9-27] 14  SpO2:  [87 %-100 %] 100 %  BP: ()/(50-64) 105/52  Arterial Line BP: ()/(38-56) 130/38     Weight: 130 kg (286 lb 9.6 oz)  Body mass index is 36.8 kg/m².    SpO2: 100 %  O2 Device (Oxygen Therapy): nasal cannula      Intake/Output Summary (Last 24 hours) at 07/26/18 1719  Last data filed at 07/26/18 1600   Gross per 24 hour   Intake              769 ml   Output             5286 ml   Net            -4517 ml       Lines/Drains/Airways     Central Venous Catheter Line                 Introducer 07/23/18 0500 right internal jugular 3 days         Percutaneous Central Line Insertion/Assessment - double lumen  07/23/18 0500 right internal jugular 3 days         Percutaneous Central Line Insertion/Assessment - triple lumen  07/23/18 0530 right internal jugular 3 days          Drain                 Urethral Catheter 07/21/18 1706 5 days          Arterial Line                 Arterial Line 07/26/18 1500 Right Radial less than 1 day          Peripheral Intravenous Line                 Midline Catheter Insertion/Assessment  - Single Lumen 07/20/18 1128 Right cephalic vein (lateral side of arm) 18g x 10cm 6 days                Physical Exam   Constitutional: He is oriented to person, place, and time. He appears well-developed. No distress.   HENT:   Head: Normocephalic and atraumatic.   Eyes: EOM are normal. Pupils are equal, round, and reactive to light.   Neck: Normal range of motion. Neck supple. No JVD present.   Cardiovascular: Regular rhythm, S1 normal, S2 normal and intact distal pulses.  Bradycardia present.    Pulmonary/Chest: Effort normal and breath sounds normal.   Abdominal: Soft. Bowel sounds are normal.   RUQ incision c/d/i   Musculoskeletal: He exhibits edema (dependent edema of bilateral lower extremities).   Neurological: He is alert and oriented to person, place, and time.   Skin: Skin is  warm and dry. He is not diaphoretic. There is erythema (bilateral legs).       Significant Labs:   ABG:   Recent Labs  Lab 07/25/18  1015 07/25/18  1147 07/26/18  0521   PH 7.379 7.373 7.474*   PCO2 37.2 38.2 39.3   HCO3 21.9* 22.3* 28.9*   POCSATURATED 99 100 65*   BE -3 -3 5   , CMP   Recent Labs  Lab 07/25/18  0354  07/25/18  2226 07/26/18  0303 07/26/18  0611 07/26/18  1345     138  138  < > 139  139  139 139  139 139 138   K 5.1  5.1  5.1  < > 4.6  4.6  4.6 4.3  4.3 4.3 4.4     108  108  < > 106  106  106 104  104 104 103   CO2 21*  21*  21*  < > 23  23  23 26  26 28 29   *  261*  261*  < > 164*  164*  164* 133*  133* 127* 139*   BUN 59*  59*  59*  < > 52*  52*  52* 37*  37* 31* 19   CREATININE 3.7*  3.7*  3.7*  < > 3.2*  3.2*  3.2* 2.4*  2.4* 2.1* 1.4   CALCIUM 7.4*  7.4*  7.4*  < > 7.5*  7.5*  7.5* 7.5*  7.5* 7.5* 7.3*   PROT 3.6*  --   --  4.0*  --  4.0*   ALBUMIN 2.2*  2.2*  < > 2.4*  2.4* 2.3*  2.3*  --  2.4*   BILITOT 1.5*  --   --  1.1*  --  1.4*   ALKPHOS 72  --   --  112  --  115   AST 77*  --   --  59*  --  50*   ALT 91*  --   --  79*  --  69*   ANIONGAP 9  9  9  < > 10  10  10 9  9 7* 6*   ESTGFRAFRICA 19.1*  19.1*  19.1*  < > 22.8*  22.8*  22.8* 32.2*  32.2* 37.9* >60.0   EGFRNONAA 16.5*  16.5*  16.5*  < > 19.7*  19.7*  19.7* 27.9*  27.9* 32.7* 53.5*   < > = values in this interval not displayed., CBC   Recent Labs  Lab 07/25/18  0354 07/26/18  0303 07/26/18  1345   WBC 2.51* 5.26 3.91   HGB 8.0* 8.3* 7.3*   HCT 24.1* 25.1* 22.7*   PLT 21* 31* 46*   , INR   Recent Labs  Lab 07/24/18  1906 07/25/18  0354 07/26/18  0303   INR 1.3* 1.2 1.3*   , and All pertinent lab results from the last 24 hours have been reviewed.    Significant Imaging: Echocardiogram:   2D echo with color flow doppler:   Results for orders placed or performed during the hospital encounter of 06/22/18   2D echo with color flow doppler   Result Value Ref  Range    EF 55 55 - 65    Mitral Valve Regurgitation TRIVIAL     Diastolic Dysfunction No     Est. PA Systolic Pressure 22.36     Mitral Valve Mobility NORMAL     Tricuspid Valve Regurgitation TRIVIAL    , EKG: sinus bradycardia (7/26)     Assessment and Plan:     Sinus bradycardia    A 63 y.o. male with hx significant for ESLD (s/p OLT on 7/23), CAD, HTN and CKD3, in the SICU now post-op day 3 following liver transplant, with concern for new bradyarrhythmia.  After review, we believe this not to be junctional bradycardia, but sinus bradycardia, likely secondary to metabolic derangement in pt with recent liver transplant and CKD (on CRRT).  We recommend the following:    -Avoid AV shan blocking agents  -Keep atropine at bedside  -Use atropine only if pt symptomatically bradycardic or hypotensive/hemodynamically unstable            VTE Risk Mitigation         Ordered     heparin (porcine) injection 5,000 Units  Every 8 hours      07/26/18 1330     Place sequential compression device  Until discontinued      07/23/18 1326     IP VTE HIGH RISK PATIENT  Once      07/23/18 1242     Send SCD stockings and ESE hose with patient to OR  Continuous      07/22/18 3627          Thank you for your consult. I will follow-up with patient. Please contact us if you have any additional questions.    Gilberto Pa MD  Cardiology   [facility]

## 2018-07-26 NOTE — ASSESSMENT & PLAN NOTE
- starting pressor to keep patient's MAP>65 during CRRT  - continue to monitor serial RFPs and strict Is & Os  - Avoid nephrotoxic medications  - Hb > 7gm/dL  - CRRT

## 2018-07-26 NOTE — SUBJECTIVE & OBJECTIVE
"Interval HPI:   Overnight events:  AF, bradycardic.    On transition at 1.5u/h.  POC not checked q4h.  Most recent BG in 80s, and IV insulin continued.     NPO.    /60 (BP Location: Left arm, Patient Position: Lying)   Pulse (!) 41   Temp 98.2 °F (36.8 °C) (Core ([place]))   Resp (!) 21   Ht 6' 2" (1.88 m)   Wt 130 kg (286 lb 9.6 oz)   SpO2 96%   BMI 36.80 kg/m²     Labs Reviewed and Include      Recent Labs  Lab 07/26/18  0303 07/26/18  0611   *  133* 127*   CALCIUM 7.5*  7.5* 7.5*   ALBUMIN 2.3*  2.3*  --    PROT 4.0*  --      139 139   K 4.3  4.3 4.3   CO2 26  26 28     104 104   BUN 37*  37* 31*   CREATININE 2.4*  2.4* 2.1*   ALKPHOS 112  --    ALT 79*  --    AST 59*  --    BILITOT 1.1*  --      Lab Results   Component Value Date    WBC 5.26 07/26/2018    HGB 8.3 (L) 07/26/2018    HCT 25.1 (L) 07/26/2018    MCV 97 07/26/2018    PLT 31 (LL) 07/26/2018       Recent Labs  Lab 07/20/18  0639   TSH 2.714   FREET4 0.53*     Lab Results   Component Value Date    HGBA1C 5.2 07/22/2018       Nutritional status:   Body mass index is 36.8 kg/m².  Lab Results   Component Value Date    ALBUMIN 2.3 (L) 07/26/2018    ALBUMIN 2.3 (L) 07/26/2018    ALBUMIN 2.4 (L) 07/25/2018    ALBUMIN 2.4 (L) 07/25/2018     Lab Results   Component Value Date    PREALBUMIN 4 (L) 06/30/2018    PREALBUMIN 5 (L) 01/22/2018    PREALBUMIN 7 (L) 08/31/2016       Estimated Creatinine Clearance: 52.3 mL/min (A) (based on SCr of 2.1 mg/dL (H)).    Accu-Checks  Recent Labs      07/24/18 2005 07/24/18   2355  07/25/18   0358  07/25/18   0815  07/25/18   1156  07/25/18   1644  07/25/18   1941  07/25/18   2252  07/26/18   0320  07/26/18   0916   POCTGLUCOSE  202*  220*  256*  250*  237*  205*  219*  166*  161*  89       Current Medications and/or Treatments Impacting Glycemic Control  Immunotherapy:  Immunosuppressants         Stop Route Frequency     tacrolimus (PROGRAF) 1 mg/mL oral syringe      -- Oral 2 times " daily     mycophenolate mofetil 200 mg/mL suspension 1,000 mg      -- Oral 2 times daily        Steroids:   Hormones     Start     Stop Route Frequency Ordered    07/29/18 0900  predniSONE tablet 20 mg  (methylprednisolone taper panel)      -- Oral Daily 07/23/18 1326    07/28/18 0900  methylPREDNISolone sodium succinate injection 20 mg  (methylprednisolone taper panel)      07/29 0859 IV Every 12 hours 07/23/18 1326    07/27/18 0900  methylPREDNISolone sodium succinate injection 40 mg  (methylprednisolone taper panel)      07/28 0859 IV Every 12 hours 07/23/18 1326    07/26/18 0900  methylPREDNISolone sodium succinate injection 60 mg  (methylprednisolone taper panel)      07/27 0859 IV Every 12 hours 07/23/18 1326    07/23/18 1330  vasopressin (PITRESSIN) 0.2 Units/mL in dextrose 5 % 100 mL infusion      -- IV Continuous 07/23/18 1326        Pressors:    Autonomic Drugs     Start     Stop Route Frequency Ordered    07/23/18 1930  norepinephrine 4 mg in dextrose 5% 250 mL infusion (premix) (titrating)     Question Answer Comment   Titrate by: (in mcg/kg/min) 0.05    Titrate interval: (in minutes) 5    Titrate to maintain: (MAP or SBP) MAP    Greater than: (in mmHg) 90    Maximum dose: (in mcg/kg/min) 3        -- IV Continuous 07/23/18 1828        Hyperglycemia/Diabetes Medications: Antihyperglycemics     Start     Stop Route Frequency Ordered    07/24/18 1245  insulin regular (Humulin R) 100 Units in sodium chloride 0.9% 100 mL infusion      -- IV Continuous 07/24/18 1137    07/24/18 1236  insulin aspart U-100 pen 0-4 Units      -- SubQ As needed (PRN) 07/24/18 1137

## 2018-07-26 NOTE — PROGRESS NOTES
"[facility]  Endocrinology  Progress Note    Admit Date: 6/22/2018     Reason for Consult: Management of hypothermia and followed by consult on steroid induced hyperglycemia     Diabetes diagnosis year: none      HPI:   Patient is a 63 y.o. 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 Hospital course was complicated by hepatic encephalopathy. During the course, he has also had few episodes of hypothermia with temp as low as 93 F. He completed 7 day course of vancomycin for cellilitis, and is currently on vancomycin and cefepime for possible sepsis. However he continues to be hypothermic.     Further work-up of hypothermia was done, which showed TSH: 2.714, FT4: 0.53, AM cortisol: 9.9.    Interval HPI:   Overnight events:  AF, bradycardic.    On transition at 1.5u/h.  POC not checked q4h.  Most recent BG in 80s, and IV insulin continued.     NPO.    /60 (BP Location: Left arm, Patient Position: Lying)   Pulse (!) 41   Temp 98.2 °F (36.8 °C) (Core ([place]))   Resp (!) 21   Ht 6' 2" (1.88 m)   Wt 130 kg (286 lb 9.6 oz)   SpO2 96%   BMI 36.80 kg/m²       Labs Reviewed and Include      Recent Labs  Lab 07/26/18  0303 07/26/18  0611   *  133* 127*   CALCIUM 7.5*  7.5* 7.5*   ALBUMIN 2.3*  2.3*  --    PROT 4.0*  --      139 139   K 4.3  4.3 4.3   CO2 26  26 28     104 104   BUN 37*  37* 31*   CREATININE 2.4*  2.4* 2.1*   ALKPHOS 112  --    ALT 79*  --    AST 59*  --    BILITOT 1.1*  --      Lab Results   Component Value Date    WBC 5.26 07/26/2018    HGB 8.3 (L) 07/26/2018    HCT 25.1 (L) 07/26/2018    MCV 97 07/26/2018    PLT 31 (LL) 07/26/2018       Recent " Labs  Lab 07/20/18  0639   TSH 2.714   FREET4 0.53*     Lab Results   Component Value Date    HGBA1C 5.2 07/22/2018       Nutritional status:   Body mass index is 36.8 kg/m².  Lab Results   Component Value Date    ALBUMIN 2.3 (L) 07/26/2018    ALBUMIN 2.3 (L) 07/26/2018    ALBUMIN 2.4 (L) 07/25/2018    ALBUMIN 2.4 (L) 07/25/2018     Lab Results   Component Value Date    PREALBUMIN 4 (L) 06/30/2018    PREALBUMIN 5 (L) 01/22/2018    PREALBUMIN 7 (L) 08/31/2016       Estimated Creatinine Clearance: 52.3 mL/min (A) (based on SCr of 2.1 mg/dL (H)).    Accu-Checks  Recent Labs      07/24/18   2005  07/24/18   2355  07/25/18   0358  07/25/18   0815  07/25/18   1156  07/25/18   1644  07/25/18   1941  07/25/18   2252  07/26/18   0320  07/26/18   0916   POCTGLUCOSE  202*  220*  256*  250*  237*  205*  219*  166*  161*  89       Current Medications and/or Treatments Impacting Glycemic Control  Immunotherapy:  Immunosuppressants         Stop Route Frequency     tacrolimus (PROGRAF) 1 mg/mL oral syringe      -- Oral 2 times daily     mycophenolate mofetil 200 mg/mL suspension 1,000 mg      -- Oral 2 times daily        Steroids:   Hormones     Start     Stop Route Frequency Ordered    07/29/18 0900  predniSONE tablet 20 mg  (methylprednisolone taper panel)      -- Oral Daily 07/23/18 1326    07/28/18 0900  methylPREDNISolone sodium succinate injection 20 mg  (methylprednisolone taper panel)      07/29 0859 IV Every 12 hours 07/23/18 1326    07/27/18 0900  methylPREDNISolone sodium succinate injection 40 mg  (methylprednisolone taper panel)      07/28 0859 IV Every 12 hours 07/23/18 1326    07/26/18 0900  methylPREDNISolone sodium succinate injection 60 mg  (methylprednisolone taper panel)      07/27 0859 IV Every 12 hours 07/23/18 1326    07/23/18 1330  vasopressin (PITRESSIN) 0.2 Units/mL in dextrose 5 % 100 mL infusion      -- IV Continuous 07/23/18 1326        Pressors:    Autonomic Drugs     Start     Stop Route Frequency  Ordered    07/23/18 1930  norepinephrine 4 mg in dextrose 5% 250 mL infusion (premix) (titrating)     Question Answer Comment   Titrate by: (in mcg/kg/min) 0.05    Titrate interval: (in minutes) 5    Titrate to maintain: (MAP or SBP) MAP    Greater than: (in mmHg) 90    Maximum dose: (in mcg/kg/min) 3        -- IV Continuous 07/23/18 1828        Hyperglycemia/Diabetes Medications: Antihyperglycemics     Start     Stop Route Frequency Ordered    07/24/18 1245  insulin regular (Humulin R) 100 Units in sodium chloride 0.9% 100 mL infusion      -- IV Continuous 07/24/18 1137    07/24/18 1236  insulin aspart U-100 pen 0-4 Units      -- SubQ As needed (PRN) 07/24/18 1137          ASSESSMENT and PLAN    * Acute kidney injury superimposed on chronic kidney disease      Avoid insulin stacking        Steroid-induced hyperglycemia    No previous history of DM  BG goal 140-180    Currently on transition at 1.5u/h.  BG in the 80s, IV insulin should be held.  Plan for repeat POC.  If <70, to follow hypoglycemic protocol.     Recommend  Once BG >140, restart transition IV to 1.0u/h   Patient NPO, but once diet advanced will order scheduled.  Continue low dose correction  POC q4h while NPO    Discharge:  TBD.          Liver transplanted      S/p transplanted.  On high dose steroids and immunosuppressants.  Currently on scheduled steroid taper.  Per transplant          Coronary artery disease involving native coronary artery of native heart without angina pectoris      Avoid hypoglycemia            Alesia Gómez Telles MD  Endocrinology  [facility]

## 2018-07-26 NOTE — SUBJECTIVE & OBJECTIVE
Past Medical History:   Diagnosis Date    Anticoagulant long-term use     Arthritis     Back pain     Cellulitis     Cirrhosis     Coronary artery disease     DM (diabetes mellitus)     Hearing loss     right ear    Hepatic encephalopathy     Hypertension     diagnosed today (11/25/2015) and prescribed toprol    Leg edema     Nephrolithiasis     JACK (obstructive sleep apnea)     Seizures     Splenomegaly        Past Surgical History:   Procedure Laterality Date    APPENDECTOMY      Open    BACK SURGERY      CHOLECYSTECTOMY      laprascopic    COLONOSCOPY N/A 1/25/2018    Procedure: COLONOSCOPY;  Surgeon: Lashawn Myles MD;  Location: [facility] ENDO (2ND FLR);  Service: Endoscopy;  Laterality: N/A;    LIVER TRANSPLANT N/A 7/23/2018    Procedure: TRANSPLANT, LIVER;  Surgeon: Alma Joyce MD;  Location: [facility] OR [facility] FLR;  Service: Transplant;  Laterality: N/A;    LUMBAR DISCECTOMY      SPLENIC ARTERY EMBOLIZATION  04/08/2016    Dr Taveras    TONSILLECTOMY         Review of patient's allergies indicates:   Allergen Reactions    Nsaids (non-steroidal anti-inflammatory drug)      D/t to liver disease.    Tylenol [acetaminophen]      D/t liver disease.    Penicillins Other (See Comments)     Tolerated pip-tazo in 8/2016 without issue  Tolerated cefepime 7/2018 without issue  Since childhood was told not to take it       No current facility-administered medications on file prior to encounter.      Current Outpatient Prescriptions on File Prior to Encounter   Medication Sig    ascorbic acid (VITAMIN C) 1000 MG tablet Take 1,000 mg by mouth once daily.    aspirin (ECOTRIN) 81 MG EC tablet Take 1 tablet (81 mg total) by mouth once daily.    atorvastatin (LIPITOR) 40 MG tablet TAKE 1 TABLET(40 MG) BY MOUTH EVERY DAY    ferrous gluconate 270 mg (27 mg iron) Tab Take 1 tablet by mouth once daily.    fish oil-omega-3 fatty acids 300-1,000 mg capsule Take 2 capsules (2 g total) by mouth once daily.  (Patient taking differently: Take 2 g by mouth 2 (two) times daily. )    furosemide (LASIX) 80 MG tablet Take 1 tablet (80 mg total) by mouth 2 (two) times daily.    GENERLAC 10 gram/15 mL solution Take 60 mLs (40 g total) by mouth 3 (three) times daily. 60mg three times per day (Patient taking differently: Take 60 mLs by mouth 3 (three) times daily. )    levETIRAcetam (KEPPRA) 500 MG Tab Take 1 tablet (500 mg total) by mouth 2 (two) times daily.    magnesium oxide (MAG-OX) 400 mg tablet Take 1 tablet (400 mg total) by mouth once daily.    ondansetron (ZOFRAN) 4 MG tablet Take 1 tablet (4 mg total) by mouth every 8 (eight) hours as needed for Nausea.    pantoprazole (PROTONIX) 40 MG tablet Take 1 tablet (40 mg total) by mouth 2 (two) times daily before meals.    rifAXIMin (XIFAXAN) 550 mg Tab Take 1 tablet (550 mg total) by mouth 2 (two) times daily.    sodium bicarbonate 650 MG tablet Take 1 tablet (650 mg total) by mouth 2 (two) times daily.    spironolactone (ALDACTONE) 100 MG tablet Take 2 tablets (200 mg total) by mouth 2 (two) times daily.    tramadol (ULTRAM) 50 mg tablet Take 50 mg by mouth every 6 (six) hours as needed for Pain.     Family History     None        Social History Main Topics    Smoking status: Never Smoker    Smokeless tobacco: Current User     Types: Chew    Alcohol use No    Drug use: No    Sexual activity: Not on file     Review of Systems   Constitution: Positive for malaise/fatigue. Negative for chills, diaphoresis and fever.   Cardiovascular: Positive for leg swelling. Negative for chest pain, irregular heartbeat, near-syncope, palpitations and syncope.   Gastrointestinal: Negative for abdominal pain, constipation, diarrhea, nausea and vomiting.   Genitourinary: Negative for dysuria and hematuria.   Neurological: Negative for focal weakness and light-headedness.     Objective:     Vital Signs (Most Recent):  Temp: 98.2 °F (36.8 °C) (07/26/18 1500)  Pulse: (!) 41 (07/26/18  1635)  Resp: 14 (07/26/18 1635)  BP: (!) 105/52 (07/26/18 1635)  SpO2: 100 % (07/26/18 1635) Vital Signs (24h Range):  Temp:  [97.6 °F (36.4 °C)-98.4 °F (36.9 °C)] 98.2 °F (36.8 °C)  Pulse:  [38-68] 41  Resp:  [9-27] 14  SpO2:  [87 %-100 %] 100 %  BP: ()/(50-64) 105/52  Arterial Line BP: ()/(38-56) 130/38     Weight: 130 kg (286 lb 9.6 oz)  Body mass index is 36.8 kg/m².    SpO2: 100 %  O2 Device (Oxygen Therapy): nasal cannula      Intake/Output Summary (Last 24 hours) at 07/26/18 1719  Last data filed at 07/26/18 1600   Gross per 24 hour   Intake              769 ml   Output             5286 ml   Net            -4517 ml       Lines/Drains/Airways     Central Venous Catheter Line                 Introducer 07/23/18 0500 right internal jugular 3 days         Percutaneous Central Line Insertion/Assessment - double lumen  07/23/18 0500 right internal jugular 3 days         Percutaneous Central Line Insertion/Assessment - triple lumen  07/23/18 0530 right internal jugular 3 days          Drain                 Urethral Catheter 07/21/18 1706 5 days          Arterial Line                 Arterial Line 07/26/18 1500 Right Radial less than 1 day          Peripheral Intravenous Line                 Midline Catheter Insertion/Assessment  - Single Lumen 07/20/18 1128 Right cephalic vein (lateral side of arm) 18g x 10cm 6 days                Physical Exam   Constitutional: He is oriented to person, place, and time. He appears well-developed. No distress.   HENT:   Head: Normocephalic and atraumatic.   Eyes: EOM are normal. Pupils are equal, round, and reactive to light.   Neck: Normal range of motion. Neck supple. No JVD present.   Cardiovascular: Regular rhythm, S1 normal, S2 normal and intact distal pulses.  Bradycardia present.    Pulmonary/Chest: Effort normal and breath sounds normal.   Abdominal: Soft. Bowel sounds are normal.   RUQ incision c/d/i   Musculoskeletal: He exhibits edema (dependent edema of  bilateral lower extremities).   Neurological: He is alert and oriented to person, place, and time.   Skin: Skin is warm and dry. He is not diaphoretic. There is erythema (bilateral legs).       Significant Labs:   ABG:   Recent Labs  Lab 07/25/18  1015 07/25/18  1147 07/26/18  0521   PH 7.379 7.373 7.474*   PCO2 37.2 38.2 39.3   HCO3 21.9* 22.3* 28.9*   POCSATURATED 99 100 65*   BE -3 -3 5   , CMP   Recent Labs  Lab 07/25/18  0354  07/25/18  2226 07/26/18  0303 07/26/18  0611 07/26/18  1345     138  138  < > 139  139  139 139  139 139 138   K 5.1  5.1  5.1  < > 4.6  4.6  4.6 4.3  4.3 4.3 4.4     108  108  < > 106  106  106 104  104 104 103   CO2 21*  21*  21*  < > 23  23  23 26  26 28 29   *  261*  261*  < > 164*  164*  164* 133*  133* 127* 139*   BUN 59*  59*  59*  < > 52*  52*  52* 37*  37* 31* 19   CREATININE 3.7*  3.7*  3.7*  < > 3.2*  3.2*  3.2* 2.4*  2.4* 2.1* 1.4   CALCIUM 7.4*  7.4*  7.4*  < > 7.5*  7.5*  7.5* 7.5*  7.5* 7.5* 7.3*   PROT 3.6*  --   --  4.0*  --  4.0*   ALBUMIN 2.2*  2.2*  < > 2.4*  2.4* 2.3*  2.3*  --  2.4*   BILITOT 1.5*  --   --  1.1*  --  1.4*   ALKPHOS 72  --   --  112  --  115   AST 77*  --   --  59*  --  50*   ALT 91*  --   --  79*  --  69*   ANIONGAP 9  9  9  < > 10  10  10 9  9 7* 6*   ESTGFRAFRICA 19.1*  19.1*  19.1*  < > 22.8*  22.8*  22.8* 32.2*  32.2* 37.9* >60.0   EGFRNONAA 16.5*  16.5*  16.5*  < > 19.7*  19.7*  19.7* 27.9*  27.9* 32.7* 53.5*   < > = values in this interval not displayed., CBC   Recent Labs  Lab 07/25/18  0354 07/26/18  0303 07/26/18  1345   WBC 2.51* 5.26 3.91   HGB 8.0* 8.3* 7.3*   HCT 24.1* 25.1* 22.7*   PLT 21* 31* 46*   , INR   Recent Labs  Lab 07/24/18  1906 07/25/18  0354 07/26/18  0303   INR 1.3* 1.2 1.3*   , and All pertinent lab results from the last 24 hours have been reviewed.    Significant Imaging: Echocardiogram:   2D echo with color flow doppler:   Results for orders  placed or performed during the hospital encounter of 06/22/18   2D echo with color flow doppler   Result Value Ref Range    EF 55 55 - 65    Mitral Valve Regurgitation TRIVIAL     Diastolic Dysfunction No     Est. PA Systolic Pressure 22.36     Mitral Valve Mobility NORMAL     Tricuspid Valve Regurgitation TRIVIAL    , EKG: sinus bradycardia (7/26)

## 2018-07-26 NOTE — PROGRESS NOTES
Dr. Grimm called to bedside for hypotension and worsening bradycardia (HR of 36). Pt is currently AAOx4 and following all commands. EKG and labs were ordered. Will continue to monitor pt closely. See flowsheets for specific details.

## 2018-07-26 NOTE — PROGRESS NOTES
[facility]  Nephrology  Progress Note    Patient Name: Alon Adamson  MRN: [id number]  Admission Date: 6/22/2018  Hospital Length of Stay: 34 days  Attending Provider: Williams Moon Jr., MD   Primary Care Physician: Mckinley Vides MD  Principal Problem:Acute kidney injury superimposed on chronic kidney disease    Subjective:     HPI: Mr. Adamson is 63 yr old male with decompensated alcoholic cirrhosis, CKD III and CAD admitted here on 06/22/18 admitted for bilateral LE hypervolemia and VICKY on CKD III with cr of 2.3 baseline around 1.5. Patient has multiple hospitalization in the past secondary to decompensated liver failure. Patient is currently on transplant team. During current admission patient was getting lasix and albumin intermittently for VICKY however renal function was not getting better. Patient hospital course complicated by hepatic encephalopathy with some improvement of mentation with lactulose. Also treated for cellulitis with 7 days of vancomycin.Nephrology is consulted for worsening/not improving VICKY despite lasix/albumin.     Per chart review patient has no hypotensive episodes, BP mostly above 100's. Has not received nephrotoxins such as NSAIDs/contrast media. No sever sepsis/septic shock or acute blood loss during current admit. UA with 2+ leukocytes and Hyaline casts, no wbc/rbc cast or proteinuria. Urine Na+ < 20.    Patient was transferred to ICU on 7/13/2018 after being more lethargic and hypoactive.  After two days was stepped down back to Transplant [place].       Interval History: patient is stable, had CRRT started yesterday, Cr 2.1, MAP 87,   Patient is off pressor.  Hypokalemia K 3.4    Review of patient's allergies indicates:   Allergen Reactions    Nsaids (non-steroidal anti-inflammatory drug)      D/t to liver disease.    Tylenol [acetaminophen]      D/t liver disease.    Penicillins Other (See Comments)     Tolerated pip-tazo in 8/2016 without  issue  Tolerated cefepime 7/2018 without issue  Since childhood was told not to take it     Current Facility-Administered Medications   Medication Frequency    0.9%  NaCl infusion (CRRT USE ONLY) Continuous    0.9%  NaCl infusion (for blood administration) Q24H PRN    acyclovir capsule 400 mg BID    ascorbic acid (vitamin C) tablet 1,000 mg Daily    atorvastatin tablet 40 mg Daily    cefTRIAXone (ROCEPHIN) 2 g in dextrose 5 % 50 mL IVPB Q24H    dextrose 50% injection 12.5 g PRN    dextrose 50% injection 25 g PRN    diphenhydrAMINE-zinc acetate 1-0.1% cream TID PRN    docusate sodium capsule 100 mg Daily    ergocalciferol capsule 50,000 Units Q7 Days    fluconazole tablet 200 mg Daily    glucagon (human recombinant) injection 1 mg PRN    glucose chewable tablet 16 g PRN    glucose chewable tablet 24 g PRN    heparin (porcine) injection 5,000 Units Q8H    insulin aspart U-100 pen 0-4 Units PRN    insulin regular (Humulin R) 100 Units in sodium chloride 0.9% 100 mL infusion Continuous    levETIRAcetam in NaCl (iso-os) IVPB 500 mg Q12H    magnesium sulfate 2g in water 50mL IVPB (premix) PRN    methylPREDNISolone sodium succinate injection 60 mg Q12H    Followed by    [START ON 7/27/2018] methylPREDNISolone sodium succinate injection 40 mg Q12H    Followed by    [START ON 7/28/2018] methylPREDNISolone sodium succinate injection 20 mg Q12H    Followed by    [START ON 7/29/2018] predniSONE tablet 20 mg Daily    metoclopramide HCl injection 10 mg QID (AC + HS) PRN    mycophenolate mofetil 200 mg/mL suspension 1,000 mg BID    norepinephrine 4 mg in dextrose 5% 250 mL infusion (premix) (titrating) Continuous    ondansetron injection 4 mg Q8H PRN    oxyCODONE immediate release tablet 10 mg Q6H PRN    oxyCODONE immediate release tablet 5 mg Q6H PRN    pantoprazole 40 mg in dextrose 5 % 100 mL IVPB (over 15 minutes) (ready to mix system) BID    sertraline tablet 50 mg Daily    sodium chloride  0.9% flush 3 mL PRN    sodium phosphate 20.01 mmol in dextrose 5 % 250 mL IVPB PRN    sodium phosphate 30 mmol in dextrose 5 % 250 mL IVPB PRN    sodium phosphate 39.99 mmol in dextrose 5 % 250 mL IVPB PRN    sulfamethoxazole-trimethoprim 400-80mg per tablet 1 tablet Daily    tacrolimus (PROGRAF) 1 mg/mL oral syringe BID    triamcinolone acetonide 0.1% cream BID    vasopressin (PITRESSIN) 0.2 Units/mL in dextrose 5 % 100 mL infusion Continuous    zinc sulfate capsule 220 mg BID       Objective:     Vital Signs (Most Recent):  Temp: 98.2 °F (36.8 °C) (07/26/18 0700)  Pulse: (!) 41 (07/26/18 0900)  Resp: (!) 21 (07/26/18 0400)  BP: 125/60 (07/26/18 0900)  SpO2: 96 % (07/26/18 0900)  O2 Device (Oxygen Therapy): nasal cannula (07/26/18 0900) Vital Signs (24h Range):  Temp:  [96.7 °F (35.9 °C)-98.2 °F (36.8 °C)] 98.2 °F (36.8 °C)  Pulse:  [38-68] 41  Resp:  [9-50] 21  SpO2:  [95 %-100 %] 96 %  BP: (105-139)/(52-66) 125/60  Arterial Line BP: (104-143)/(40-55) 107/41     Weight: 130 kg (286 lb 9.6 oz) (07/26/18 0400)  Body mass index is 36.8 kg/m².  Body surface area is 2.61 meters squared.    I/O last 3 completed shifts:  In: 2496 [I.V.:1961; Blood:505; NG/GT:30]  Out: 3623 [Urine:127; Drains:852; Other:2644]    Physical Exam   Constitutional: He is oriented to person, place, and time. He appears well-developed and well-nourished. No distress.   Neck: Normal range of motion. Neck supple. No JVD present.   Cardiovascular: Normal rate, regular rhythm, normal heart sounds and intact distal pulses.  Exam reveals no friction rub.    No murmur heard.  Pulmonary/Chest: Effort normal and breath sounds normal. No respiratory distress. He has no wheezes. He has no rales. He exhibits no tenderness.   Abdominal: Soft. Bowel sounds are normal.   Musculoskeletal: Normal range of motion. He exhibits edema. He exhibits no deformity.   Neurological: He is alert and oriented to person, place, and time.   Skin: Skin is warm and dry.  Capillary refill takes less than 2 seconds. No rash noted. He is not diaphoretic. No erythema. No pallor.   Psychiatric: He has a normal mood and affect. His behavior is normal. Judgment and thought content normal.   Nursing note and vitals reviewed.      Significant Labs:  CBC:   Recent Labs  Lab 07/26/18  0303   WBC 5.26   RBC 2.60*   HGB 8.3*   HCT 25.1*   PLT 31*   MCV 97   MCH 31.9*   MCHC 33.1     CMP:   Recent Labs  Lab 07/26/18  0303 07/26/18  0611   *  133* 127*   CALCIUM 7.5*  7.5* 7.5*   ALBUMIN 2.3*  2.3*  --    PROT 4.0*  --      139 139   K 4.3  4.3 4.3   CO2 26  26 28     104 104   BUN 37*  37* 31*   CREATININE 2.4*  2.4* 2.1*   ALKPHOS 112  --    ALT 79*  --    AST 59*  --    BILITOT 1.1*  --        Recent Labs  Lab 07/22/18  1756   COLORU Brown*   SPECGRAV 1.020   PHUR 5.0   PROTEINUA 2+*   BACTERIA Rare   NITRITE Negative   LEUKOCYTESUR 1+*   UROBILINOGEN Negative   HYALINECASTS 0            Assessment/Plan:     * Acute kidney injury superimposed on chronic kidney disease    - starting pressor to keep patient's MAP>65 during CRRT  - continue to monitor serial RFPs and strict Is & Os  - Avoid nephrotoxic medications  - Hb > 7gm/dL  - CRRT                     Thank you for your consult. I will follow-up with patient. Please contact us if you have any additional questions.    Chichi Urbina MD  Nephrology  [facility]    ATTENDING PHYSICIAN ATTESTATION  I have personally interviewed and examined the patient. I thoroughly reviewed the demographic, clinical, laboratorial and imaging information available in medical records. I agree with the assessment and recommendations provided by the subspecialty resident. Dr. Urbina was under my supervision.     DIALYSIS NOTE  Patient evaluated while undergoing Slow Low Efficiency Dialysis (SLED) indicated for VICKY and hemodynamic instability. No complications, tolerating session with current UFR. Will continue current  support.

## 2018-07-26 NOTE — PROGRESS NOTES
Pt AAOx4, follows commands, often becomes withdrawn and needs re-orientation  Afebrile; Systolic 100-160 maintained throughout shift; Pt remains bradycardic but asymptomatic  Pt tolerating CRRT well; UF goal of 250 being met currently; pt oliguric   Accu cks monitored Q4H; insulin gtt discontinued; pt did not require SSI this shift  CHAS drain removed by MD  2 units of plts given; stitch put in central line; pt still oozing from central line and arterial line  Radial A. Line placed for continuous BP monitoring  Pt turned Q2H to help prevent further skin breakdown   Will continue to monitor pt closely  See flow sheets for specific details

## 2018-07-26 NOTE — ASSESSMENT & PLAN NOTE
A 63 y.o. male with hx significant for ESLD (s/p OLT on 7/23), CAD, HTN and CKD3, in the SICU now post-op day 3 following liver transplant, with concern for new bradyarrhythmia.  After review, we believe this not to be junctional bradycardia, but sinus bradycardia, likely secondary to metabolic derangement in pt with recent liver transplant and CKD (on CRRT).  We recommend the following:    -Avoid AV shan blocking agents  -Keep atropine at bedside  -Use atropine only if pt symptomatically bradycardic or hypotensive/hemodynamically unstable

## 2018-07-26 NOTE — ASSESSMENT & PLAN NOTE
63 yr old male with decompensated alcoholic cirrhosis, CKD III and CAD. Hospitalized with HE, VICKY on CKD. S/p OLT on 7/23 with intraop CRRT. Brought to the ICU intubated, not requiring pressor support. Significant blood loss, with aggressive resuscitation intraop    Neuro  -hx seizures and HE  -keppra  -Sedation off  -continues to be disoriented after extubation    Cardiovascular  - h/o CAD  - 2D Echo 7/9 normal EF (55-60%), trivial tricuspid/mitral regurg  - vasopressin weaned off 7/25    Respiratory:  - extubated 7/25  - no acute issues    Renal:  -VICKY on CKD-intraop CRRT, anuric  -Nephrology following, on CRRT  -[place], BUN/Cr    Liver and Pancreas  -s/p OLT 7/23   -Trend liver enzymes  -PLTs low.  Transfuse per Transplant Surgery    GI  -NPO  -Replete lytes prn       ID  -Anti-rejection, antiretrovirals  -monitor WBC    Heme  -h/h stable, thrombocytopenia. 14 L blood loss intraop.   RBC (Units) 9   FFP (Units) 2   Cryoprecipitate (Units)  2   Platelets (Units) 2   -PLT transfusion per Transplant Surgery  -some heme output in OG tube, continue to monitor CBC and output    Dispo  -Primary team Transplant Surgery  -Monitor platelets, any bleeding, and other LFTs  -Vasopressin weaned, switch CRRT to HD when able  -Continue SICU care

## 2018-07-26 NOTE — PROGRESS NOTES
[facility]  Liver Transplant  Progress Note    Patient Name: Alon Adamson  MRN: [id number]  Admission Date: 2018  Hospital Length of Stay: 34 days  Code Status: Full Code  Primary Care Provider: Mckinley Vides MD  Post-Op Day 3 Days Post-Op    Admit Diagnosis: ESLD, ESRD  Procedures: OLTx    ORGAN:   LIVER  Disease Etiology: Alcoholic Cirrhosis  Donor Type:    - Brain Death  CDC High Risk:   No  Donor CMV Status:   Donor CMV Status: Negative  Donor HBcAB:   Negative  Donor HCV Status:   Negative  Donor HBV TAMAR: Negative  Donor HCV TAMAR: Negative  Whole or Partial: Whole Liver  Biliary Anastomosis: End to End  Arterial Anatomy: Standard    Subjective:     Interval History:  Extubated yesterday and weaned off of vasopressin. Tolerated CRRT overnight. Much more alert and oriented this morning.     Scheduled Meds:   acyclovir  400 mg Oral BID    ascorbic acid (vitamin C)  1,000 mg Oral Daily    atorvastatin  40 mg Oral Daily    cefTRIAXone (ROCEPHIN) IVPB  2 g Intravenous Q24H    docusate sodium  100 mg Oral Daily    ergocalciferol  50,000 Units Oral Q7 Days    fluconazole  200 mg Oral Daily    heparin (porcine)  5,000 Units Subcutaneous Q8H    levetiracetam IVPB  500 mg Intravenous Q12H    methylPREDNISolone sodium succinate  60 mg Intravenous Q12H    Followed by    [START ON 2018] methylPREDNISolone sodium succinate  40 mg Intravenous Q12H    Followed by    [START ON 2018] methylPREDNISolone sodium succinate  20 mg Intravenous Q12H    Followed by    [START ON 2018] predniSONE  20 mg Oral Daily    mycophenolate mofetil  1,000 mg Oral BID    pantoprazole 40 mg in dextrose 5 % 100 mL IVPB (over 15 minutes) (ready to mix system)  40 mg Intravenous BID    sertraline  50 mg Oral Daily    sulfamethoxazole-trimethoprim 400-80mg  1 tablet Oral Daily    tacrolimus  1 mg Oral BID    triamcinolone acetonide 0.1%   Topical (Top) BID    zinc sulfate  220 mg Oral  BID     Continuous Infusions:   sodium chloride 0.9% 200 mL/hr at 07/26/18 1300    insulin (HUMAN R) infusion (adults) Stopped (07/26/18 1000)     PRN Meds:sodium chloride, sodium chloride, dextrose 50%, dextrose 50%, diphenhydrAMINE-zinc acetate 1-0.1%, glucagon (human recombinant), glucose, glucose, insulin aspart U-100, magnesium sulfate IVPB, metoclopramide HCl, ondansetron, oxyCODONE, oxyCODONE, sodium chloride 0.9%, sodium phosphate IVPB, sodium phosphate IVPB, sodium phosphate IVPB    Review of Systems   HENT: Negative.    Respiratory: Negative.    Cardiovascular: Negative.    Gastrointestinal: Positive for abdominal pain.   Genitourinary: Negative.    Musculoskeletal: Positive for back pain and joint swelling.   Skin: Positive for rash and wound.   Neurological: Negative.    Psychiatric/Behavioral: Positive for confusion.     Objective:     Vital Signs (Most Recent):  Temp: 98.4 °F (36.9 °C) (07/26/18 1200)  Pulse: (!) 44 (07/26/18 1300)  Resp: 14 (07/26/18 1300)  BP: (!) 105/51 (07/26/18 1300)  SpO2: 100 % (07/26/18 1300) Vital Signs (24h Range):  Temp:  [97.6 °F (36.4 °C)-98.4 °F (36.9 °C)] 98.4 °F (36.9 °C)  Pulse:  [38-68] 44  Resp:  [9-43] 14  SpO2:  [95 %-100 %] 100 %  BP: (101-139)/(51-66) 105/51  Arterial Line BP: ()/(38-55) 111/43     Weight: 130 kg (286 lb 9.6 oz)  Body mass index is 36.8 kg/m².    Intake/Output - Last 3 Shifts       07/24 0700 - 07/25 0659 07/25 0700 - 07/26 0659 07/26 0700 - 07/27 0659    P.O.   240    I.V. (mL/kg) 1758.5 (14.4) 1023 (7.9)     Blood 287 505 228    NG/      Total Intake(mL/kg) 2425.5 (19.8) 1528 (11.8) 468 (3.6)    Urine (mL/kg/hr) 20 (0) 102 (0) 30 (0)    Drains 1673 (0.6) 242 (0.1) 50 (0.1)    Other  2644 (0.8) 1749 (1.9)    Total Output 1693 2988 1829    Net +732.5 -1460 -1361           Stool Occurrence 1 x 2 x           Physical Exam   Constitutional: He is oriented to person, place, and time. He appears well-developed and well-nourished.   HENT:    Head: Normocephalic and atraumatic.   Eyes: No scleral icterus.   Cardiovascular: Normal rate and regular rhythm.    No murmur heard.  Pulmonary/Chest: Effort normal and breath sounds normal. He has no wheezes.   Abdominal: Soft. He exhibits no distension.   Abdominal incision c/d/i with staples in place   Musculoskeletal: He exhibits edema.   Lymphadenopathy:     He has no cervical adenopathy.   Neurological: He is alert and oriented to person, place, and time.   Skin: Skin is warm and dry. He is not diaphoretic. There is erythema.   Dry flaking skin throughout       Laboratory:  Immunosuppressants         Stop Route Frequency     tacrolimus (PROGRAF) 1 mg/mL oral syringe      -- Oral 2 times daily     mycophenolate mofetil 200 mg/mL suspension 1,000 mg      -- Oral 2 times daily        CBC:   Recent Labs  Lab 07/26/18  0303   WBC 5.26   RBC 2.60*   HGB 8.3*   HCT 25.1*   PLT 31*   MCV 97   MCH 31.9*   MCHC 33.1     CMP:   Recent Labs  Lab 07/26/18  0303 07/26/18  0611   *  133* 127*   CALCIUM 7.5*  7.5* 7.5*   ALBUMIN 2.3*  2.3*  --    PROT 4.0*  --      139 139   K 4.3  4.3 4.3   CO2 26  26 28     104 104   BUN 37*  37* 31*   CREATININE 2.4*  2.4* 2.1*   ALKPHOS 112  --    ALT 79*  --    AST 59*  --    BILITOT 1.1*  --      Coagulation:   Recent Labs  Lab 07/26/18  0303   INR 1.3*   APTT 34.8*     ABGs:   Recent Labs  Lab 07/26/18  0521   PH 7.474*   PCO2 39.3   HCO3 28.9*   POCSATURATED 65*   BE 5     Labs within the past 24 hours have been reviewed.    Diagnostic Results:  I have personally reviewed all pertinent imaging studies.    Assessment/Plan:   Alon Adamson is a 63 y.o. male     GI   * Liver transplanted    63 year old male with ESLD 2/2 EtOH and ESRD s/p liver transplant 7/23/18    Neuro:   - Sedation: None  - Pain control: PRN PO Oxycodone  - Keppra for seizure ppx     Pulmonary:   - 2LNC; wean as tolerated  - Daily CXR  - IS/OOBTC     Cardiac:  - Drips: None  -  Vasopressin weaned off  - New asymptomatic bradycardia. Junctional bradycardia on EKG  - Atropine at bedside, pacer pads in place  - Cardiology consulted to eval      Renal:   - BUN/CR: 31/21  - CRRT per Nephrology  - Trend BMP     Infectious Disease:   - Afebrile, WBC normal   - Immunosuppression with Tacro, Cellcept, MPN  - Opp infection ppx with Bactrim, Valcyte, Nystatin  - Rocephin x14 days for E. Coli+ Bcx in donor  - Trend WBC  - Antibiotics:      Hematology/Oncology:  - H/H stable  - INR 1.3  - Trend coags  - Trend CBC     Endocrine:  - Insulin gtt     Fluids/Electrolytes/Nutrition/GI:   - CLD, advance as tolerated  - Trend CMP  - Replace Lytes PRN      Dispo:  Continue SICU care.   Primary team: Transplant Surgery          Ascites due to alcoholic cirrhosis    - Para 6/25 negative for infection   - Para 7/10 negative for infection. Monitor.           Decompensated hepatic cirrhosis    - Placed status 7 on 6/26 for prescription drug coverage change. Re-activated 7/3. Back on hold d/t frailty and decompensation at the end of last week prompting ICU transfer. Now off hold 7/17.  Plan to submit MELD exception points. Will remeld today at 27.       MELD-Na score: 27 at 7/21/2018  4:01 AM  MELD score: 27 at 7/21/2018  4:01 AM  Calculated from:  Serum Creatinine: 3.7 mg/dL at 7/21/2018  4:00 AM  Serum Sodium: 147 mmol/L (Rounded to 137) at 7/21/2018  4:00 AM  Total Bilirubin: 1.6 mg/dL at 7/21/2018  4:00 AM  INR(ratio): 1.7 at 7/21/2018  4:01 AM  Age: 63 years        Hepatic encephalopathy    - ESLD secondary to EtOH cirrhosis with severe complications (hyperbilirubinemia, hypoalbuminemia, severe malnutrition, hepatic encephalopathy, thrombocytopenia, splenomegaly, ascites, hypervolemia, coaguloopathy, portal HTN), seizure disorder on Keppra, CKD3, CAD   - has frequent episodes of lethargy/confusion/slowing when holding lactulose   - admitted to SICU 7/13/18 for worsening encephalopathy and hypothermia  - PO  lactulose TID, PRN lactulose enemas, Rifaximin  - sleepy this AM. Given lactulose enema with improvement in mentation. Continue oral lactulose and PRN lactulose enemas for HE control.           Orthopedic   Hypothermia    - with episodes of hypothermia on 7/17 and 7/18.   - BS antibxs on board for prophylaxis.   - blood cultures NGTD.   - ID consulted for further management.           Other   Organ transplant candidate    Neuro:   - alert and oriented to person only  - no sedation, pain control prn     Pulmonary:   - 2L NC, wean as tolerated to maintain sats >95%  - monitor     Cardiac:  - HDS  - vaso 0.04  - CVP 6-8     Renal:   - BUN/Cr 68/3.8  - UOP minimal ~10cc overnight  - follow up nephrology recs for dialysis options     Infectious Disease:   - WBC 2.54  - rocephin for donor positive cultures  - Trend WBC     Hematology/Oncology:  - H/H 8/24  - platelets this AM for drain removal  - Trend CBC     Endocrine:  - insulin gtt 0.9  - monitor     Fluids/Electrolytes/Nutrition/GI:   - NPO, CLD once bed side swallow passed  - Trend CMP  - Replace Lytes PRN     Pain Management::   - dilaudid 0.2 mg prn     Dispo:  Continue SICU care                Physical deconditioning    - PT/OT consulted.    - Encourage OOBTC for several hours each day, walking with walker with PT        Hypoalbuminemia due to protein-calorie malnutrition    - see severe protein-calorie malnutrition             The patients clinical status was discussed at multidisplinary rounds, involving transplant surgery, transplant medicine, pharmacy, nursing, nutrition, and social work.    Rudy Grimm MD  Liver Transplant  [facility]

## 2018-07-26 NOTE — PROGRESS NOTES
SW met with patient and patient's caregiver/wife, Erica at patient's bedside. Caregiver was AAOx4, and engaged. Patient was extubated and sleep when SW presented to the room. Caregiver reported that the patient has been experiencing some confusion.  SW provided caregiver with [facility] co-pay estimation form. Caregiver reported that all the medication was affordable and she would be able to cover the cost of medication. Caregiver continues to report adeuate coping and denies mental health concerns. SW remains available.

## 2018-07-26 NOTE — ASSESSMENT & PLAN NOTE
No previous history of DM  BG goal 140-180    Currently on transition at 1.5u/h.  BG in the 80s, IV insulin should be held.  Plan for repeat POC.  If <70, to follow hypoglycemic protocol.     Recommend  Once BG >140, restart transition IV to 1.0u/h   Patient NPO, but once diet advanced will order scheduled.  Continue low dose correction  POC q4h while NPO    Discharge:  TBD.

## 2018-07-26 NOTE — SUBJECTIVE & OBJECTIVE
Interval History: patient is stable, had CRRT started yesterday, Cr 2.1, MAP 87,   Patient is off pressor.  Hypokalemia K 3.4    Review of patient's allergies indicates:   Allergen Reactions    Nsaids (non-steroidal anti-inflammatory drug)      D/t to liver disease.    Tylenol [acetaminophen]      D/t liver disease.    Penicillins Other (See Comments)     Tolerated pip-tazo in 8/2016 without issue  Tolerated cefepime 7/2018 without issue  Since childhood was told not to take it     Current Facility-Administered Medications   Medication Frequency    0.9%  NaCl infusion (CRRT USE ONLY) Continuous    0.9%  NaCl infusion (for blood administration) Q24H PRN    acyclovir capsule 400 mg BID    ascorbic acid (vitamin C) tablet 1,000 mg Daily    atorvastatin tablet 40 mg Daily    cefTRIAXone (ROCEPHIN) 2 g in dextrose 5 % 50 mL IVPB Q24H    dextrose 50% injection 12.5 g PRN    dextrose 50% injection 25 g PRN    diphenhydrAMINE-zinc acetate 1-0.1% cream TID PRN    docusate sodium capsule 100 mg Daily    ergocalciferol capsule 50,000 Units Q7 Days    fluconazole tablet 200 mg Daily    glucagon (human recombinant) injection 1 mg PRN    glucose chewable tablet 16 g PRN    glucose chewable tablet 24 g PRN    heparin (porcine) injection 5,000 Units Q8H    insulin aspart U-100 pen 0-4 Units PRN    insulin regular (Humulin R) 100 Units in sodium chloride 0.9% 100 mL infusion Continuous    levETIRAcetam in NaCl (iso-os) IVPB 500 mg Q12H    magnesium sulfate 2g in water 50mL IVPB (premix) PRN    methylPREDNISolone sodium succinate injection 60 mg Q12H    Followed by    [START ON 7/27/2018] methylPREDNISolone sodium succinate injection 40 mg Q12H    Followed by    [START ON 7/28/2018] methylPREDNISolone sodium succinate injection 20 mg Q12H    Followed by    [START ON 7/29/2018] predniSONE tablet 20 mg Daily    metoclopramide HCl injection 10 mg QID (AC + HS) PRN    mycophenolate mofetil 200 mg/mL  suspension 1,000 mg BID    norepinephrine 4 mg in dextrose 5% 250 mL infusion (premix) (titrating) Continuous    ondansetron injection 4 mg Q8H PRN    oxyCODONE immediate release tablet 10 mg Q6H PRN    oxyCODONE immediate release tablet 5 mg Q6H PRN    pantoprazole 40 mg in dextrose 5 % 100 mL IVPB (over 15 minutes) (ready to mix system) BID    sertraline tablet 50 mg Daily    sodium chloride 0.9% flush 3 mL PRN    sodium phosphate 20.01 mmol in dextrose 5 % 250 mL IVPB PRN    sodium phosphate 30 mmol in dextrose 5 % 250 mL IVPB PRN    sodium phosphate 39.99 mmol in dextrose 5 % 250 mL IVPB PRN    sulfamethoxazole-trimethoprim 400-80mg per tablet 1 tablet Daily    tacrolimus (PROGRAF) 1 mg/mL oral syringe BID    triamcinolone acetonide 0.1% cream BID    vasopressin (PITRESSIN) 0.2 Units/mL in dextrose 5 % 100 mL infusion Continuous    zinc sulfate capsule 220 mg BID       Objective:     Vital Signs (Most Recent):  Temp: 98.2 °F (36.8 °C) (07/26/18 0700)  Pulse: (!) 41 (07/26/18 0900)  Resp: (!) 21 (07/26/18 0400)  BP: 125/60 (07/26/18 0900)  SpO2: 96 % (07/26/18 0900)  O2 Device (Oxygen Therapy): nasal cannula (07/26/18 0900) Vital Signs (24h Range):  Temp:  [96.7 °F (35.9 °C)-98.2 °F (36.8 °C)] 98.2 °F (36.8 °C)  Pulse:  [38-68] 41  Resp:  [9-50] 21  SpO2:  [95 %-100 %] 96 %  BP: (105-139)/(52-66) 125/60  Arterial Line BP: (104-143)/(40-55) 107/41     Weight: 130 kg (286 lb 9.6 oz) (07/26/18 0400)  Body mass index is 36.8 kg/m².  Body surface area is 2.61 meters squared.    I/O last 3 completed shifts:  In: 2496 [I.V.:1961; Blood:505; NG/GT:30]  Out: 3623 [Urine:127; Drains:852; Other:2644]    Physical Exam   Constitutional: He is oriented to person, place, and time. He appears well-developed and well-nourished. No distress.   Neck: Normal range of motion. Neck supple. No JVD present.   Cardiovascular: Normal rate, regular rhythm, normal heart sounds and intact distal pulses.  Exam reveals no  friction rub.    No murmur heard.  Pulmonary/Chest: Effort normal and breath sounds normal. No respiratory distress. He has no wheezes. He has no rales. He exhibits no tenderness.   Abdominal: Soft. Bowel sounds are normal.   Musculoskeletal: Normal range of motion. He exhibits edema. He exhibits no deformity.   Neurological: He is alert and oriented to person, place, and time.   Skin: Skin is warm and dry. Capillary refill takes less than 2 seconds. No rash noted. He is not diaphoretic. No erythema. No pallor.   Psychiatric: He has a normal mood and affect. His behavior is normal. Judgment and thought content normal.   Nursing note and vitals reviewed.      Significant Labs:  CBC:   Recent Labs  Lab 07/26/18  0303   WBC 5.26   RBC 2.60*   HGB 8.3*   HCT 25.1*   PLT 31*   MCV 97   MCH 31.9*   MCHC 33.1     CMP:   Recent Labs  Lab 07/26/18  0303 07/26/18  0611   *  133* 127*   CALCIUM 7.5*  7.5* 7.5*   ALBUMIN 2.3*  2.3*  --    PROT 4.0*  --      139 139   K 4.3  4.3 4.3   CO2 26  26 28     104 104   BUN 37*  37* 31*   CREATININE 2.4*  2.4* 2.1*   ALKPHOS 112  --    ALT 79*  --    AST 59*  --    BILITOT 1.1*  --        Recent Labs  Lab 07/22/18  1756   COLORU Brown*   SPECGRAV 1.020   PHUR 5.0   PROTEINUA 2+*   BACTERIA Rare   NITRITE Negative   LEUKOCYTESUR 1+*   UROBILINOGEN Negative   HYALINECASTS 0

## 2018-07-26 NOTE — PROGRESS NOTES
[facility]  Critical Care - Surgery  Progress Note    Patient Name: Alon Adamson  MRN: [id number]  Admission Date: 6/22/2018  Hospital Length of Stay: 34 days  Code Status: Full Code  Attending Provider: Williams Moon Jr., MD  Primary Care Provider: Mckinley Vides MD   Principal Problem: Acute kidney injury superimposed on chronic kidney disease    Subjective:     Hospital/ICU Course:  Per tranplant, Patient is listed for liver transplant, placed on internal hold 6/25 for HE then status 7 on 6/26 due to change in prescription coverage. He was re-activated on the list 7/3, MELD 22. Pt started on IV diuretics 6/22 and continued 6/23 and 6/24.  BLE edema and ascites sign if improved with diuresis and kidney function also improved. During admission, patient had worsening encephalopathy and asterixis prompting infectious work up, all blood and urine cultures obtained during admission with NGTD. Intermittently, encephalopathy severe enough to warrant lactulose enemas. CT head obtained and negative. Paracentesis 6/25 negative for peritonitis per cell count and U/A negative. Pt also with concern for BLE cellulitis- upper legs bright red with lower BLE still with dark appearance of venous stasis. Placed on vancomycin with improvement in BLE redness 6/26. Vanc continued for 7 days (ended 7/1). His HE waxed and waned for several days requiring lactulose enemas. Pt eating minimally during period of encephalopathy requiring gentle hydration with IVF 6/26. Of note, taco placement attempted 6/26 but unable secondary to agitation upon insertion prompting self resolving nose bleed. Micro lab called 6/26 PM with blood cx + for G+C in blood- pt continued on vanc which was started 6/25. ID consulted, suspect + blood cultures contaminant. EEG obtained 6/26 as pt with h/o serizures and negative. Pt continued on home Keppra. VICKY on admission initially improved with diuresis, but Cr sakina intermittently during  admission likely related to aggressive diuresis. Of note, with chest pain overnight 6/26-6/27 that resolved without intervention and pt reported as mild.  EKG NSR with PVC, troponin 0.1 in setting of hypervolemia + IVCKY.  Normal dobutamine stress 1/2018.  Cardiology consulted and felt not ACS, no need for further workup other than continue to treat current risk factors for CAD with ASA (pt already on ASA and statin as outpt) + BB for portal hypertension which was started. Repeat paracentesis 7/10, 4.8L off, no SBP. Hypernatremia noted 7/9, nephrology consulted to aid in management. Hypernatremia attributed to dehydration from frequent BM's, diuretics were held, d/c'd creon due to excessive diarrhea, and patient was treated with increasing po free water and IVF. Echo 7/9 with normal EF, no wall abnormality, mild tricuspid/mitral regurg.     Overnight from 7/12 to 7/13, pt became hypothermic to 91.2 corrected with warming blankets, experienced worsening encephalopathy, hypotensive to 80/40.  Transferred to SICU.  Corrected with albumin 5% bolus.  Given additional lactulose.    7/14- no acute issues, ammonia level decreased, mentation improving    7/23-OLT  7/24 - unable to tolerate full CRRT due to hypotension, on vasopressin 0.04, unable to wean vent due to mental status, pulm function good, low PLT being monitored, no acute bleed.  AST/ALT improving.  7/25 - extubated, vasopressin weaned off, received CRRT, 1U PLT    Interval History/Significant Events: Yesterday extubated, vasopressin weaned off, received CRRT, 1U PLT.  Still with some disorientation to place and time.    Follow-up For: Procedure(s) (LRB):  TRANSPLANT, LIVER (N/A)    Post-Operative Day: 3 Days Post-Op    Objective:     Vital Signs (Most Recent):  Temp: 97.6 °F (36.4 °C) (07/26/18 0300)  Pulse: (!) 42 (07/26/18 0500)  Resp: (!) 21 (07/26/18 0400)  BP: (!) 109/53 (07/26/18 0500)  SpO2: 100 % (07/26/18 0500) Vital Signs (24h Range):  Temp:  [96.7 °F  (35.9 °C)-97.8 °F (36.6 °C)] 97.6 °F (36.4 °C)  Pulse:  [41-73] 42  Resp:  [2-50] 21  SpO2:  [95 %-100 %] 100 %  BP: (105-139)/(52-68) 109/53  Arterial Line BP: ()/(41-55) 121/48     Weight: 130 kg (286 lb 9.6 oz)  Body mass index is 36.8 kg/m².      Intake/Output Summary (Last 24 hours) at 07/26/18 0621  Last data filed at 07/26/18 0600   Gross per 24 hour   Intake             1528 ml   Output             2891 ml   Net            -1363 ml       Physical Exam   Constitutional: He appears well-developed and well-nourished. No distress.   HENT:   Head: Normocephalic and atraumatic.   Eyes: Pupils are equal, round, and reactive to light.   Neck: Neck supple. No JVD present. No tracheal deviation present.   Cardiovascular: Normal rate, regular rhythm and normal heart sounds.    Pulmonary/Chest: Effort normal and breath sounds normal. No respiratory distress. He has no wheezes. He has no rales.   Abdominal: Soft. Bowel sounds are normal. He exhibits no distension. There is no tenderness. There is no guarding.   Incision in upper quadrants, Liver transplant surgery    Neurological: He is alert.   Only oriented to self   Skin: He is not diaphoretic.   Vitals reviewed.      Vents:  Vent Mode: Spont (07/25/18 0929)  Set Rate: 16 bmp (07/25/18 0700)  Vt Set: 500 mL (07/23/18 1446)  Pressure Support: 10 cmH20 (07/25/18 0929)  PEEP/CPAP: 5 cmH20 (07/25/18 0929)  Oxygen Concentration (%): 40 (07/25/18 1100)  Peak Airway Pressure: 16 cmH2O (07/25/18 0929)  Plateau Pressure: 12 cmH20 (07/25/18 0929)  Total Ve: 14.1 mL (07/25/18 0929)  Negative Inspiratory Force (cm H2O): -21 (07/25/18 1020)  F/VT Ratio<105 (RSBI): (!) 24.86 (07/25/18 0700)    Lines/Drains/Airways     Central Venous Catheter Line                 Introducer 07/23/18 0500 right internal jugular 3 days         Percutaneous Central Line Insertion/Assessment - double lumen  07/23/18 0500 right internal jugular 3 days         Percutaneous Central Line  Insertion/Assessment - double lumen  07/23/18 0512 right femoral 3 days         Percutaneous Central Line Insertion/Assessment - triple lumen  07/23/18 0530 right internal jugular 3 days         Pulmonary Artery Catheter Assessment  07/23/18 0530 3 days          Drain                 Urethral Catheter 07/21/18 1706 4 days         Closed/Suction Drain 07/23/18 Right;Medial Abdomen Bulb 3 days          Arterial Line                 Arterial Line 07/23/18 0500 Right Femoral 3 days          Peripheral Intravenous Line                 Midline Catheter Insertion/Assessment  - Single Lumen 07/20/18 1128 Right cephalic vein (lateral side of arm) 18g x 10cm 5 days         Peripheral IV - Single Lumen 07/23/18 0511 Left Antecubital 3 days                Significant Labs:    CBC/Anemia Profile:    Recent Labs  Lab 07/24/18  1906 07/25/18  0354 07/26/18  0303   WBC 5.17 2.51* 5.26   HGB 8.0* 8.0* 8.3*   HCT 23.9* 24.1* 25.1*   PLT 34* 21* 31*   MCV 98 98 97   RDW 18.9* 18.9* 18.2*        Chemistries:    Recent Labs  Lab 07/24/18  1906  07/25/18  0354 07/25/18  1401 07/25/18  2226 07/26/18  0303   NA  --   < > 138  138  138 137  137 139  139  139 139  139   K  --   < > 5.1  5.1  5.1 5.1  5.1 4.6  4.6  4.6 4.3  4.3   CL  --   < > 108  108  108 106  106 106  106  106 104  104   CO2  --   < > 21*  21*  21* 18*  18* 23 23 23 26 26   BUN  --   < > 59*  59*  59* 68*  68* 52*  52*  52* 37*  37*   CREATININE  --   < > 3.7*  3.7*  3.7* 3.8*  3.8* 3.2*  3.2*  3.2* 2.4*  2.4*   CALCIUM  --   < > 7.4*  7.4*  7.4* 7.3*  7.3* 7.5*  7.5*  7.5* 7.5*  7.5*   ALBUMIN  --   < > 2.2*  2.2* 2.4* 2.4*  2.4* 2.3*  2.3*   PROT  --   --  3.6*  --   --  4.0*   BILITOT  --   --  1.5*  --   --  1.1*   ALKPHOS  --   --  72  --   --  112   ALT  --   --  91*  --   --  79*   AST 98*  --  77*  --   --  59*   MG  --   < > 2.5 2.6 2.4  2.4 2.1   PHOS  --   < > 7.3*  7.3* 7.8* 6.2*  6.2* 5.4*  5.4*   < > = values  in this interval not displayed.    All pertinent labs within the past 24 hours have been reviewed.    Significant Imaging:  I have reviewed all pertinent imaging results/findings within the past 24 hours.    Assessment/Plan:     Hepatic encephalopathy    63 yr old male with decompensated alcoholic cirrhosis, CKD III and CAD. Hospitalized with HE, VICKY on CKD. S/p OLT on 7/23 with intraop CRRT. Brought to the ICU intubated, not requiring pressor support. Significant blood loss, with aggressive resuscitation intraop    Neuro  -hx seizures and HE  -keppra  -Sedation off  -continues to be disoriented after extubation    Cardiovascular  - h/o CAD  - 2D Echo 7/9 normal EF (55-60%), trivial tricuspid/mitral regurg  - vasopressin weaned off 7/25    Respiratory:  - extubated 7/25  - no acute issues    Renal:  -VICKY on CKD-intraop CRRT, anuric  -Nephrology following, on CRRT  -[place], BUN/Cr    Liver and Pancreas  -s/p OLT 7/23   -Trend liver enzymes  -PLTs low.  Transfuse per Transplant Surgery    GI  -NPO  -Replete lytes prn       ID  -Anti-rejection, antiretrovirals  -monitor WBC    Heme  -h/h stable, thrombocytopenia. 14 L blood loss intraop.   RBC (Units) 9   FFP (Units) 2   Cryoprecipitate (Units)  2   Platelets (Units) 2   -PLT transfusion per Transplant Surgery  -some heme output in OG tube, continue to monitor CBC and output    Dispo  -Primary team Transplant Surgery  -Monitor platelets, any bleeding, and other LFTs  -Vasopressin weaned, switch CRRT to HD when able  -Continue SICU care          Critical care was time spent personally by me on the following activities: development of treatment plan with patient or surrogate and bedside caregivers, discussions with consultants, evaluation of patient's response to treatment, examination of patient, ordering and performing treatments and interventions, ordering and review of laboratory studies, ordering and review of radiographic studies, pulse oximetry, re-evaluation of  patient's condition.  This critical care time did not overlap with that of any other provider or involve time for any procedures.     Ernesto Fraire MD  Critical Care - Surgery  [facility]

## 2018-07-26 NOTE — PROGRESS NOTES
CRRT:    CRRT new start, to run for 24 hours via right IJ temporary catheter, both ports with good flow,   UF rate @ 250ml/hr.

## 2018-07-26 NOTE — SUBJECTIVE & OBJECTIVE
Interval History/Significant Events: Yesterday extubated, vasopressin weaned off, received CRRT, 1U PLT.  Still with some disorientation to place and time.    Follow-up For: Procedure(s) (LRB):  TRANSPLANT, LIVER (N/A)    Post-Operative Day: 3 Days Post-Op    Objective:     Vital Signs (Most Recent):  Temp: 97.6 °F (36.4 °C) (07/26/18 0300)  Pulse: (!) 42 (07/26/18 0500)  Resp: (!) 21 (07/26/18 0400)  BP: (!) 109/53 (07/26/18 0500)  SpO2: 100 % (07/26/18 0500) Vital Signs (24h Range):  Temp:  [96.7 °F (35.9 °C)-97.8 °F (36.6 °C)] 97.6 °F (36.4 °C)  Pulse:  [41-73] 42  Resp:  [2-50] 21  SpO2:  [95 %-100 %] 100 %  BP: (105-139)/(52-68) 109/53  Arterial Line BP: ()/(41-55) 121/48     Weight: 130 kg (286 lb 9.6 oz)  Body mass index is 36.8 kg/m².      Intake/Output Summary (Last 24 hours) at 07/26/18 0621  Last data filed at 07/26/18 0600   Gross per 24 hour   Intake             1528 ml   Output             2891 ml   Net            -1363 ml       Physical Exam   Constitutional: He appears well-developed and well-nourished. No distress.   HENT:   Head: Normocephalic and atraumatic.   Eyes: Pupils are equal, round, and reactive to light.   Neck: Neck supple. No JVD present. No tracheal deviation present.   Cardiovascular: Normal rate, regular rhythm and normal heart sounds.    Pulmonary/Chest: Effort normal and breath sounds normal. No respiratory distress. He has no wheezes. He has no rales.   Abdominal: Soft. Bowel sounds are normal. He exhibits no distension. There is no tenderness. There is no guarding.   Incision in upper quadrants, Liver transplant surgery    Neurological: He is alert.   Only oriented to self   Skin: He is not diaphoretic.   Vitals reviewed.      Vents:  Vent Mode: Spont (07/25/18 0929)  Set Rate: 16 bmp (07/25/18 0700)  Vt Set: 500 mL (07/23/18 1446)  Pressure Support: 10 cmH20 (07/25/18 0929)  PEEP/CPAP: 5 cmH20 (07/25/18 0929)  Oxygen Concentration (%): 40 (07/25/18 1100)  Peak Airway  Pressure: 16 cmH2O (07/25/18 0929)  Plateau Pressure: 12 cmH20 (07/25/18 0929)  Total Ve: 14.1 mL (07/25/18 0929)  Negative Inspiratory Force (cm H2O): -21 (07/25/18 1020)  F/VT Ratio<105 (RSBI): (!) 24.86 (07/25/18 0700)    Lines/Drains/Airways     Central Venous Catheter Line                 Introducer 07/23/18 0500 right internal jugular 3 days         Percutaneous Central Line Insertion/Assessment - double lumen  07/23/18 0500 right internal jugular 3 days         Percutaneous Central Line Insertion/Assessment - double lumen  07/23/18 0512 right femoral 3 days         Percutaneous Central Line Insertion/Assessment - triple lumen  07/23/18 0530 right internal jugular 3 days         Pulmonary Artery Catheter Assessment  07/23/18 0530 3 days          Drain                 Urethral Catheter 07/21/18 1706 4 days         Closed/Suction Drain 07/23/18 Right;Medial Abdomen Bulb 3 days          Arterial Line                 Arterial Line 07/23/18 0500 Right Femoral 3 days          Peripheral Intravenous Line                 Midline Catheter Insertion/Assessment  - Single Lumen 07/20/18 1128 Right cephalic vein (lateral side of arm) 18g x 10cm 5 days         Peripheral IV - Single Lumen 07/23/18 0511 Left Antecubital 3 days                Significant Labs:    CBC/Anemia Profile:    Recent Labs  Lab 07/24/18 1906 07/25/18  0354 07/26/18  0303   WBC 5.17 2.51* 5.26   HGB 8.0* 8.0* 8.3*   HCT 23.9* 24.1* 25.1*   PLT 34* 21* 31*   MCV 98 98 97   RDW 18.9* 18.9* 18.2*        Chemistries:    Recent Labs  Lab 07/24/18  1906  07/25/18  0354 07/25/18  1401 07/25/18  2226 07/26/18  0303   NA  --   < > 138  138  138 137  137 139  139  139 139  139   K  --   < > 5.1  5.1  5.1 5.1  5.1 4.6  4.6  4.6 4.3  4.3   CL  --   < > 108  108  108 106  106 106  106  106 104  104   CO2  --   < > 21*  21*  21* 18*  18* 23  23  23 26  26   BUN  --   < > 59*  59*  59* 68*  68* 52*  52*  52* 37*  37*   CREATININE  --    < > 3.7*  3.7*  3.7* 3.8*  3.8* 3.2*  3.2*  3.2* 2.4*  2.4*   CALCIUM  --   < > 7.4*  7.4*  7.4* 7.3*  7.3* 7.5*  7.5*  7.5* 7.5*  7.5*   ALBUMIN  --   < > 2.2*  2.2* 2.4* 2.4*  2.4* 2.3*  2.3*   PROT  --   --  3.6*  --   --  4.0*   BILITOT  --   --  1.5*  --   --  1.1*   ALKPHOS  --   --  72  --   --  112   ALT  --   --  91*  --   --  79*   AST 98*  --  77*  --   --  59*   MG  --   < > 2.5 2.6 2.4  2.4 2.1   PHOS  --   < > 7.3*  7.3* 7.8* 6.2*  6.2* 5.4*  5.4*   < > = values in this interval not displayed.    All pertinent labs within the past 24 hours have been reviewed.    Significant Imaging:  I have reviewed all pertinent imaging results/findings within the past 24 hours.

## 2018-07-26 NOTE — PROCEDURES
"Alon Adamson is a 63 y.o. male patient.    Temp: 98.4 °F (36.9 °C) (18 1200)  Pulse: (!) 43 (18 1400)  Resp: 15 (18 1400)  BP: (!) 99/51 (18 1400)  SpO2: 100 % (18 1400)  Weight: 130 kg (286 lb 9.6 oz) (18 0400)  Height: 6' 2" (188 cm) (18 1500)       Arterial Line  Date/Time: 2018 3:28 PM  Performed by: DONNA ANDERSON  Authorized by: MUNIRA COX JR   Consent Done: Yes  Consent: Written consent obtained.  Risks and benefits: risks, benefits and alternatives were discussed  Consent given by: spouse  Patient understanding: patient states understanding of the procedure being performed  Patient consent: the patient's understanding of the procedure matches consent given  Procedure consent: procedure consent matches procedure scheduled  Relevant documents: relevant documents present and verified  Patient identity confirmed:  and verbally with patient  Time out: Immediately prior to procedure a "time out" was called to verify the correct patient, procedure, equipment, support staff and site/side marked as required.  Indications: hemodynamic monitoring  Location: right radial  Anesthesia: local infiltration    Anesthesia:  Local Anesthetic: lidocaine 1% without epinephrine  Anesthetic total: 1 mL  Patient sedated: no  Seldinger technique: Seldinger technique used  Number of attempts: 2  Complications: No  Estimated blood loss (mL): 1  Specimens: No  Implants: No  Post-procedure: line sutured and dressing applied  Post-procedure CMS: unchanged  Patient tolerance: Patient tolerated the procedure well with no immediate complications          SHELLI Anderson MD  2018  "

## 2018-07-26 NOTE — PLAN OF CARE
Problem: Patient Care Overview  Goal: Plan of Care Review  Outcome: Ongoing (interventions implemented as appropriate)  POC reviewed w pt and spouse. Afebrile. VSS. Sinus bradycardic. -160. Oxygenating well on 2 L NC. Insulin gtt remains infusing. Accu checks q4h. CRRT started @ 2000. SLED. UF rate 250. Pt tolerating well. Following commands. AAOx4. Pt passed bedside swallow study. CVP 3. 45cc serous output from CHAS drain. [place] in place w/ 5-10 cc/hr brown urine. All questions and concerns addressed. Will monitor pt closely.

## 2018-07-26 NOTE — ASSESSMENT & PLAN NOTE
63 year old male with ESLD 2/2 EtOH and ESRD s/p liver transplant 7/23/18    Neuro:   - Sedation: None  - Pain control: PRN PO Oxycodone     Pulmonary:   - 2LNC; wean as tolerated  - Daily CXR  - IS/OOBTC     Cardiac:  - Drips: None  - Vasopressin weaned off  - New asymptomatic bradycardia. Junctional bradycardia on EKG  - Atropine at bedside, pacer pads in place  - Cardiology consulted to eval      Renal:   - BUN/CR: 31/21  - CRRT per Nephrology  - Trend BMP     Infectious Disease:   - Afebrile, WBC normal   - Immunosuppression with Tacro, Cellcept, MPN  - Opp infection ppx with Bactrim, Valcyte, Nystatin  - Rocephin x14 days for E. Coli+ Bcx in donor  - Trend WBC  - Antibiotics:      Hematology/Oncology:  - H/H stable  - INR 1.3  - Trend coags  - Trend CBC     Endocrine:  - Insulin gtt     Fluids/Electrolytes/Nutrition/GI:   - CLD, advance as tolerated  - Trend CMP  - Replace Lytes PRN      Dispo:  Continue SICU care.   Primary team: Transplant Surgery

## 2018-07-27 LAB
ALBUMIN SERPL BCP-MCNC: 2.2 G/DL
ALBUMIN SERPL BCP-MCNC: 2.3 G/DL
ALP SERPL-CCNC: 112 U/L
ALT SERPL W/O P-5'-P-CCNC: 63 U/L
ANION GAP SERPL CALC-SCNC: 4 MMOL/L
ANION GAP SERPL CALC-SCNC: 5 MMOL/L
ANION GAP SERPL CALC-SCNC: 6 MMOL/L
APTT BLDCRRT: 29.8 SEC
AST SERPL-CCNC: 45 U/L
BASOPHILS # BLD AUTO: 0 K/UL
BASOPHILS NFR BLD: 0 %
BILIRUB DIRECT SERPL-MCNC: 0.7 MG/DL
BILIRUB SERPL-MCNC: 1.1 MG/DL
BUN SERPL-MCNC: 10 MG/DL
BUN SERPL-MCNC: 12 MG/DL
BUN SERPL-MCNC: 9 MG/DL
CALCIUM SERPL-MCNC: 6.8 MG/DL
CALCIUM SERPL-MCNC: 7 MG/DL
CALCIUM SERPL-MCNC: 7.1 MG/DL
CHLORIDE SERPL-SCNC: 103 MMOL/L
CHLORIDE SERPL-SCNC: 104 MMOL/L
CHLORIDE SERPL-SCNC: 104 MMOL/L
CHLORIDE SERPL-SCNC: 105 MMOL/L
CHLORIDE SERPL-SCNC: 106 MMOL/L
CO2 SERPL-SCNC: 25 MMOL/L
CO2 SERPL-SCNC: 26 MMOL/L
CO2 SERPL-SCNC: 27 MMOL/L
CO2 SERPL-SCNC: 27 MMOL/L
CO2 SERPL-SCNC: 28 MMOL/L
CREAT SERPL-MCNC: 0.7 MG/DL
CREAT SERPL-MCNC: 0.8 MG/DL
CREAT SERPL-MCNC: 0.9 MG/DL
DIFFERENTIAL METHOD: ABNORMAL
EOSINOPHIL # BLD AUTO: 0 K/UL
EOSINOPHIL NFR BLD: 0 %
ERYTHROCYTE [DISTWIDTH] IN BLOOD BY AUTOMATED COUNT: 17.2 %
EST. GFR  (AFRICAN AMERICAN): >60 ML/MIN/1.73 M^2
EST. GFR  (NON AFRICAN AMERICAN): >60 ML/MIN/1.73 M^2
GGT SERPL-CCNC: 124 U/L
GLUCOSE SERPL-MCNC: 146 MG/DL
GLUCOSE SERPL-MCNC: 166 MG/DL
GLUCOSE SERPL-MCNC: 166 MG/DL
GLUCOSE SERPL-MCNC: 211 MG/DL
GLUCOSE SERPL-MCNC: 226 MG/DL
HCT VFR BLD AUTO: 22.2 %
HGB BLD-MCNC: 7.4 G/DL
IMM GRANULOCYTES # BLD AUTO: 0.03 K/UL
IMM GRANULOCYTES NFR BLD AUTO: 0.8 %
INR PPP: 1.5
LYMPHOCYTES # BLD AUTO: 0.3 K/UL
LYMPHOCYTES NFR BLD: 7.7 %
MAGNESIUM SERPL-MCNC: 1.6 MG/DL
MAGNESIUM SERPL-MCNC: 1.9 MG/DL
MAGNESIUM SERPL-MCNC: 2 MG/DL
MCH RBC QN AUTO: 32.6 PG
MCHC RBC AUTO-ENTMCNC: 33.3 G/DL
MCV RBC AUTO: 98 FL
MONOCYTES # BLD AUTO: 0.3 K/UL
MONOCYTES NFR BLD: 8.7 %
NEUTROPHILS # BLD AUTO: 3.2 K/UL
NEUTROPHILS NFR BLD: 82.8 %
NRBC BLD-RTO: 0 /100 WBC
PHOSPHATE SERPL-MCNC: 2.1 MG/DL
PHOSPHATE SERPL-MCNC: 2.9 MG/DL
PHOSPHATE SERPL-MCNC: 3.2 MG/DL
PHOSPHATE SERPL-MCNC: 3.2 MG/DL
PLATELET # BLD AUTO: 44 K/UL
PMV BLD AUTO: 11.8 FL
POCT GLUCOSE: 134 MG/DL (ref 70–110)
POCT GLUCOSE: 158 MG/DL (ref 70–110)
POCT GLUCOSE: 176 MG/DL (ref 70–110)
POCT GLUCOSE: 239 MG/DL (ref 70–110)
POCT GLUCOSE: 260 MG/DL (ref 70–110)
POTASSIUM SERPL-SCNC: 4.2 MMOL/L
POTASSIUM SERPL-SCNC: 4.3 MMOL/L
PROT SERPL-MCNC: 3.8 G/DL
PROTHROMBIN TIME: 15 SEC
RBC # BLD AUTO: 2.27 M/UL
SODIUM SERPL-SCNC: 135 MMOL/L
SODIUM SERPL-SCNC: 136 MMOL/L
SODIUM SERPL-SCNC: 137 MMOL/L
TACROLIMUS BLD-MCNC: 6 NG/ML
WBC # BLD AUTO: 3.91 K/UL

## 2018-07-27 PROCEDURE — 25000003 PHARM REV CODE 250: Performed by: STUDENT IN AN ORGANIZED HEALTH CARE EDUCATION/TRAINING PROGRAM

## 2018-07-27 PROCEDURE — 80069 RENAL FUNCTION PANEL: CPT

## 2018-07-27 PROCEDURE — 63600175 PHARM REV CODE 636 W HCPCS: Performed by: STUDENT IN AN ORGANIZED HEALTH CARE EDUCATION/TRAINING PROGRAM

## 2018-07-27 PROCEDURE — 27000221 HC OXYGEN, UP TO 24 HOURS

## 2018-07-27 PROCEDURE — 93010 ELECTROCARDIOGRAM REPORT: CPT | Mod: ,,, | Performed by: INTERNAL MEDICINE

## 2018-07-27 PROCEDURE — 97168 OT RE-EVAL EST PLAN CARE: CPT

## 2018-07-27 PROCEDURE — 80048 BASIC METABOLIC PNL TOTAL CA: CPT

## 2018-07-27 PROCEDURE — 25000003 PHARM REV CODE 250: Performed by: GENERAL PRACTICE

## 2018-07-27 PROCEDURE — 93005 ELECTROCARDIOGRAM TRACING: CPT

## 2018-07-27 PROCEDURE — 82248 BILIRUBIN DIRECT: CPT

## 2018-07-27 PROCEDURE — 20000000 HC ICU ROOM

## 2018-07-27 PROCEDURE — 97110 THERAPEUTIC EXERCISES: CPT

## 2018-07-27 PROCEDURE — 25000003 PHARM REV CODE 250: Performed by: TRANSPLANT SURGERY

## 2018-07-27 PROCEDURE — 90945 DIALYSIS ONE EVALUATION: CPT

## 2018-07-27 PROCEDURE — 63600175 PHARM REV CODE 636 W HCPCS: Performed by: PEDIATRICS

## 2018-07-27 PROCEDURE — 63600175 PHARM REV CODE 636 W HCPCS: Performed by: INTERNAL MEDICINE

## 2018-07-27 PROCEDURE — 90945 DIALYSIS ONE EVALUATION: CPT | Mod: GC,,, | Performed by: INTERNAL MEDICINE

## 2018-07-27 PROCEDURE — 80100008 HC CRRT DAILY MAINTENANCE

## 2018-07-27 PROCEDURE — 85730 THROMBOPLASTIN TIME PARTIAL: CPT

## 2018-07-27 PROCEDURE — 63600175 PHARM REV CODE 636 W HCPCS: Performed by: GENERAL PRACTICE

## 2018-07-27 PROCEDURE — 80197 ASSAY OF TACROLIMUS: CPT

## 2018-07-27 PROCEDURE — 82977 ASSAY OF GGT: CPT

## 2018-07-27 PROCEDURE — 87040 BLOOD CULTURE FOR BACTERIA: CPT | Mod: 59

## 2018-07-27 PROCEDURE — 94761 N-INVAS EAR/PLS OXIMETRY MLT: CPT

## 2018-07-27 PROCEDURE — 85025 COMPLETE CBC W/AUTO DIFF WBC: CPT

## 2018-07-27 PROCEDURE — 83735 ASSAY OF MAGNESIUM: CPT | Mod: 91

## 2018-07-27 PROCEDURE — 25000003 PHARM REV CODE 250: Performed by: INTERNAL MEDICINE

## 2018-07-27 PROCEDURE — 25000003 PHARM REV CODE 250: Performed by: PEDIATRICS

## 2018-07-27 PROCEDURE — 80069 RENAL FUNCTION PANEL: CPT | Mod: 91

## 2018-07-27 PROCEDURE — C9113 INJ PANTOPRAZOLE SODIUM, VIA: HCPCS | Performed by: STUDENT IN AN ORGANIZED HEALTH CARE EDUCATION/TRAINING PROGRAM

## 2018-07-27 PROCEDURE — 80053 COMPREHEN METABOLIC PANEL: CPT

## 2018-07-27 PROCEDURE — 97164 PT RE-EVAL EST PLAN CARE: CPT

## 2018-07-27 PROCEDURE — 84100 ASSAY OF PHOSPHORUS: CPT

## 2018-07-27 PROCEDURE — 85610 PROTHROMBIN TIME: CPT

## 2018-07-27 PROCEDURE — 99233 SBSQ HOSP IP/OBS HIGH 50: CPT | Mod: ,,, | Performed by: ANESTHESIOLOGY

## 2018-07-27 PROCEDURE — 25000003 PHARM REV CODE 250: Performed by: PHYSICIAN ASSISTANT

## 2018-07-27 RX ORDER — HYDROCODONE BITARTRATE AND ACETAMINOPHEN 500; 5 MG/1; MG/1
TABLET ORAL CONTINUOUS
Status: ACTIVE | OUTPATIENT
Start: 2018-07-27 | End: 2018-07-28

## 2018-07-27 RX ORDER — INSULIN ASPART 100 [IU]/ML
3 INJECTION, SOLUTION INTRAVENOUS; SUBCUTANEOUS
Status: DISCONTINUED | OUTPATIENT
Start: 2018-07-27 | End: 2018-07-28

## 2018-07-27 RX ORDER — IBUPROFEN 200 MG
24 TABLET ORAL
Status: DISCONTINUED | OUTPATIENT
Start: 2018-07-27 | End: 2018-08-17 | Stop reason: HOSPADM

## 2018-07-27 RX ORDER — GLUCAGON 1 MG
1 KIT INJECTION
Status: DISCONTINUED | OUTPATIENT
Start: 2018-07-27 | End: 2018-08-17 | Stop reason: HOSPADM

## 2018-07-27 RX ORDER — CALCIUM CARBONATE 500(1250)
1000 TABLET ORAL ONCE
Status: COMPLETED | OUTPATIENT
Start: 2018-07-27 | End: 2018-07-27

## 2018-07-27 RX ORDER — MAGNESIUM SULFATE HEPTAHYDRATE 40 MG/ML
2 INJECTION, SOLUTION INTRAVENOUS
Status: DISPENSED | OUTPATIENT
Start: 2018-07-27 | End: 2018-07-28

## 2018-07-27 RX ORDER — INSULIN ASPART 100 [IU]/ML
0-5 INJECTION, SOLUTION INTRAVENOUS; SUBCUTANEOUS
Status: DISCONTINUED | OUTPATIENT
Start: 2018-07-27 | End: 2018-08-17 | Stop reason: HOSPADM

## 2018-07-27 RX ORDER — IBUPROFEN 200 MG
16 TABLET ORAL
Status: DISCONTINUED | OUTPATIENT
Start: 2018-07-27 | End: 2018-08-17 | Stop reason: HOSPADM

## 2018-07-27 RX ADMIN — INSULIN ASPART 3 UNITS: 100 INJECTION, SOLUTION INTRAVENOUS; SUBCUTANEOUS at 05:07

## 2018-07-27 RX ADMIN — LEVETIRACETAM 500 MG: 5 INJECTION INTRAVENOUS at 08:07

## 2018-07-27 RX ADMIN — Medication 220 MG: at 09:07

## 2018-07-27 RX ADMIN — ACYCLOVIR 400 MG: 200 CAPSULE ORAL at 09:07

## 2018-07-27 RX ADMIN — TRIAMCINOLONE ACETONIDE: 1 CREAM TOPICAL at 08:07

## 2018-07-27 RX ADMIN — DEXTROSE 40 MG: 50 INJECTION, SOLUTION INTRAVENOUS at 09:07

## 2018-07-27 RX ADMIN — VASOPRESSIN 0.04 UNITS/MIN: 20 INJECTION INTRAVENOUS at 11:07

## 2018-07-27 RX ADMIN — METHYLPREDNISOLONE SODIUM SUCCINATE 40 MG: 40 INJECTION, POWDER, FOR SOLUTION INTRAMUSCULAR; INTRAVENOUS at 08:07

## 2018-07-27 RX ADMIN — Medication 1000 MG: at 09:07

## 2018-07-27 RX ADMIN — ACYCLOVIR 400 MG: 200 CAPSULE ORAL at 08:07

## 2018-07-27 RX ADMIN — ATORVASTATIN CALCIUM 40 MG: 20 TABLET, FILM COATED ORAL at 08:07

## 2018-07-27 RX ADMIN — HEPARIN SODIUM 5000 UNITS: 5000 INJECTION, SOLUTION INTRAVENOUS; SUBCUTANEOUS at 02:07

## 2018-07-27 RX ADMIN — LEVETIRACETAM 500 MG: 5 INJECTION INTRAVENOUS at 09:07

## 2018-07-27 RX ADMIN — INSULIN ASPART 1 UNITS: 100 INJECTION, SOLUTION INTRAVENOUS; SUBCUTANEOUS at 11:07

## 2018-07-27 RX ADMIN — TRIAMCINOLONE ACETONIDE: 1 CREAM TOPICAL at 09:07

## 2018-07-27 RX ADMIN — MAGNESIUM SULFATE IN WATER 2 G: 40 INJECTION, SOLUTION INTRAVENOUS at 11:07

## 2018-07-27 RX ADMIN — CEFTRIAXONE SODIUM 2 G: 2 INJECTION, POWDER, FOR SOLUTION INTRAMUSCULAR; INTRAVENOUS at 05:07

## 2018-07-27 RX ADMIN — SULFAMETHOXAZOLE AND TRIMETHOPRIM 1 TABLET: 400; 80 TABLET ORAL at 08:07

## 2018-07-27 RX ADMIN — VASOPRESSIN 0.04 UNITS/MIN: 20 INJECTION INTRAVENOUS at 03:07

## 2018-07-27 RX ADMIN — CALCIUM 1000 MG: 500 TABLET ORAL at 11:07

## 2018-07-27 RX ADMIN — SODIUM CHLORIDE: 0.9 INJECTION, SOLUTION INTRAVENOUS at 04:07

## 2018-07-27 RX ADMIN — MAGNESIUM SULFATE IN WATER 2 G: 40 INJECTION, SOLUTION INTRAVENOUS at 12:07

## 2018-07-27 RX ADMIN — METHYLPREDNISOLONE SODIUM SUCCINATE 40 MG: 40 INJECTION, POWDER, FOR SOLUTION INTRAMUSCULAR; INTRAVENOUS at 09:07

## 2018-07-27 RX ADMIN — HEPARIN SODIUM 5000 UNITS: 5000 INJECTION, SOLUTION INTRAVENOUS; SUBCUTANEOUS at 09:07

## 2018-07-27 RX ADMIN — SERTRALINE HYDROCHLORIDE 50 MG: 50 TABLET ORAL at 08:07

## 2018-07-27 RX ADMIN — DEXTROSE 40 MG: 50 INJECTION, SOLUTION INTRAVENOUS at 08:07

## 2018-07-27 RX ADMIN — Medication 1 MG: at 05:07

## 2018-07-27 RX ADMIN — HEPARIN SODIUM 5000 UNITS: 5000 INJECTION, SOLUTION INTRAVENOUS; SUBCUTANEOUS at 05:07

## 2018-07-27 RX ADMIN — Medication 220 MG: at 08:07

## 2018-07-27 RX ADMIN — FLUCONAZOLE 200 MG: 200 TABLET ORAL at 08:07

## 2018-07-27 RX ADMIN — OXYCODONE HYDROCHLORIDE AND ACETAMINOPHEN 1000 MG: 500 TABLET ORAL at 08:07

## 2018-07-27 RX ADMIN — Medication 1 MG: at 08:07

## 2018-07-27 RX ADMIN — SODIUM PHOSPHATE, MONOBASIC, MONOHYDRATE 39.99 MMOL: 276; 142 INJECTION, SOLUTION INTRAVENOUS at 11:07

## 2018-07-27 NOTE — PROGRESS NOTES
[facility]  Liver Transplant  Progress Note    Patient Name: Alon Adamson  MRN: [id number]  Admission Date: 2018  Hospital Length of Stay: 35 days  Code Status: Full Code  Primary Care Provider: Mckinley Vides MD  Post-Op Day 4 Days Post-Op    Admit Diagnosis: Alcholic liver cirrhosis  Procedures: Liver transplant    ORGAN:   LIVER  Disease Etiology: Alcoholic Cirrhosis  Donor Type:    - Brain Death  CDC High Risk:   No  Donor CMV Status:   Donor CMV Status: Negative  Donor HBcAB:   Negative  Donor HCV Status:   Negative  Donor HBV TAMAR: Negative  Donor HCV TAMAR: Negative  Whole or Partial: Whole Liver  Biliary Anastomosis: End to End  Arterial Anatomy: Standard    Subjective:     Interval History:  CRRT overnight without requiring pressor support. Tolerating CLD. Has not ambulated to chair yet. Denies any CP, SOB, abdominal pain, N/V/F/C.    Scheduled Meds:   acyclovir  400 mg Oral BID    ascorbic acid (vitamin C)  1,000 mg Oral Daily    atorvastatin  40 mg Oral Daily    cefTRIAXone (ROCEPHIN) IVPB  2 g Intravenous Q24H    docusate sodium  100 mg Oral Daily    ergocalciferol  50,000 Units Oral Q7 Days    fluconazole  200 mg Oral Daily    heparin (porcine)  5,000 Units Subcutaneous Q8H    levetiracetam IVPB  500 mg Intravenous Q12H    lidocaine (PF) 10 mg/ml (1%)  1 mL Injection Once    methylPREDNISolone sodium succinate  40 mg Intravenous Q12H    Followed by    [START ON 2018] methylPREDNISolone sodium succinate  20 mg Intravenous Q12H    Followed by    [START ON 2018] predniSONE  20 mg Oral Daily    mycophenolate mofetil  1,000 mg Oral BID    pantoprazole 40 mg in dextrose 5 % 100 mL IVPB (over 15 minutes) (ready to mix system)  40 mg Intravenous BID    sulfamethoxazole-trimethoprim 400-80mg  1 tablet Oral Daily    tacrolimus  1 mg Oral BID    triamcinolone acetonide 0.1%   Topical (Top) BID    zinc sulfate  220 mg Oral BID     Continuous  Infusions:   sodium chloride 0.9% 200 mL/hr at 07/27/18 1000     PRN Meds:sodium chloride, sodium chloride, dextrose 50%, dextrose 50%, diphenhydrAMINE-zinc acetate 1-0.1%, gelatin adsorbable 100cm top sponge, glucagon (human recombinant), glucose, glucose, magnesium sulfate IVPB, metoclopramide HCl, ondansetron, oxyCODONE, oxyCODONE, sodium chloride 0.9%, sodium phosphate IVPB, sodium phosphate IVPB, sodium phosphate IVPB    Review of Systems   HENT: Negative.    Respiratory: Negative.    Cardiovascular: Negative.    Gastrointestinal: Positive for abdominal pain.   Genitourinary: Negative.    Musculoskeletal: Positive for back pain and joint swelling.   Skin: Positive for wound.   Neurological: Negative.    Psychiatric/Behavioral: Positive for confusion.     Objective:     Vital Signs (Most Recent):  Temp: 98.2 °F (36.8 °C) (07/27/18 0700)  Pulse: (!) 38 (07/27/18 1000)  Resp: 18 (07/27/18 1000)  BP: (!) 93/46 (07/27/18 1000)  SpO2: 100 % (07/27/18 1000) Vital Signs (24h Range):  Temp:  [98.2 °F (36.8 °C)-98.6 °F (37 °C)] 98.2 °F (36.8 °C)  Pulse:  [38-55] 38  Resp:  [9-27] 18  SpO2:  [87 %-100 %] 100 %  BP: ()/(46-64) 93/46  Arterial Line BP: ()/(34-56) 120/43     Weight: 130 kg (286 lb 9.6 oz)  Body mass index is 36.8 kg/m².    Intake/Output - Last 3 Shifts       07/25 0700 - 07/26 0659 07/26 0700 - 07/27 0659 07/27 0700 - 07/28 0659    P.O.  240 530    I.V. (mL/kg) 1023 (7.9) 7355 (56.6)     Blood 505 228     NG/GT   60    Total Intake(mL/kg) 1528 (11.8) 7823 (60.2) 590 (4.5)    Urine (mL/kg/hr) 102 (0) 65 (0) 0 (0)    Drains 242 (0.1) 50 (0)     Other 2644 (0.8) 6375 (2) 1177 (2.4)    Total Output 2988 6490 1177    Net -1460 +1333 -587           Stool Occurrence 2 x 3 x 1 x          Physical Exam   Constitutional: He is oriented to person, place, and time. He appears well-developed and well-nourished.   HENT:   Head: Normocephalic and atraumatic.   Eyes: No scleral icterus.   Cardiovascular: Normal  rate and regular rhythm.    No murmur heard.  Pulmonary/Chest: Effort normal and breath sounds normal. He has no wheezes.   Abdominal: Soft. He exhibits no distension.   Abdominal incision c/d/i with staples in place   Musculoskeletal: He exhibits edema.   Lymphadenopathy:     He has no cervical adenopathy.   Neurological: He is alert and oriented to person, place, and time.   Skin: Skin is warm and dry. He is not diaphoretic. There is erythema.   Dry flaking skin throughout       Laboratory:  Immunosuppressants         Stop Route Frequency     tacrolimus (PROGRAF) 1 mg/mL oral syringe      -- Oral 2 times daily     mycophenolate mofetil 200 mg/mL suspension 1,000 mg      -- Oral 2 times daily        CBC:     Recent Labs  Lab 07/27/18  0304   WBC 3.91   RBC 2.27*   HGB 7.4*   HCT 22.2*   PLT 44*   MCV 98   MCH 32.6*   MCHC 33.3     CMP:     Recent Labs  Lab 07/27/18  0304   *  146*  146*   CALCIUM 7.0*  7.0*  7.0*   ALBUMIN 2.3*  2.3*   PROT 3.8*     137  137   K 4.3  4.3  4.3   CO2 28  28  28     103  103   BUN 12  12  12   CREATININE 0.9  0.9  0.9   ALKPHOS 112   ALT 63*   AST 45*   BILITOT 1.1*     Coagulation:     Recent Labs  Lab 07/27/18  0304   INR 1.5*   APTT 29.8     ABGs:     Recent Labs  Lab 07/26/18  0521   PH 7.474*   PCO2 39.3   HCO3 28.9*   POCSATURATED 65*   BE 5     Labs within the past 24 hours have been reviewed.    Diagnostic Results:  I have personally reviewed all pertinent imaging studies.    Assessment/Plan:   Alon Adamson is a 63 y.o. male     Neuro   Seizures    - Oral Keppra transitioned to IV as pt not able to swallow 6/26.  - Resumed oral Keppra since mentation improved.  - EEG 6/26 negative.        Derm   Cholestatic pruritus    - cholestyramine has previously been ineffective, given depressed mood.   - Zoloft for mood and pruritus.        Cardiac/Vascular   Coronary artery disease involving native coronary artery of native heart without angina  "pectoris    - Continue home ASA.   - 2D Echo 7/9 normal EF (55-60%), trivial tricuspid/mitral regurg.        Renal/   CKD (chronic kidney disease), stage III    See VICKY. KTM consulted to assess need for kidney transplant.             Acidosis    Continue oral bicarb replacement. Monitor with daily labs.           Acute kidney injury superimposed on chronic kidney disease    -With CKD3   -VICKY initially improving with albumin infusion but with minimal intake 6/25 and 6/26.  - nephrology consulted due to hypervolemia and multiple electrolyte abnormalities (acidosis, hypernatremia)  - Creatinine continues to worsen, likely to need RRT in the near future if doesn't improve. Discussed with Nephrology. Plan for lasix trial per Nephrology. Give IV Lasix 80 mg. Monitor.         Hematology   Coagulopathy    - Secondary to decompensated liver disease.         Thrombocytopenia    - Secondary to splenomegaly from portal HTN- should improve with txp.  - No s/s overt bleed.        Oncology   Pancytopenia    - related to decompensated liver disease         Anemia of chronic disease    - H&H decreased on 7/18 and one unit of PRBCs given for replacement at that time.   - With good response to transfusion.   - H/H stable. Continue to monitor.          Endocrine   Severe protein-calorie malnutrition    - Dietary consulted for calorie count.  - Pt eating better accord to family.  Does take boost supplement.  - "pre-hab" regimen and supplements ordered. Dc creon due to excessive diarrhea  - Patient drinking at least 2-3 boost supplements and beneprotein daily.   - If continues to have low intake, consider starting Megace.           GI   * Liver transplanted    63 year old male with ESLD 2/2 EtOH and ESRD s/p liver transplant 7/23/18    Neuro:   - Sedation: None  - Pain control: PRN PO Oxycodone     Pulmonary:   - 2LNC; wean as tolerated  - CXR today  - IS/OOBTC     Cardiac:  - Drips: None  - Vasopressin weaned off  - New asymptomatic " bradycardia. Junctional bradycardia on EKG  - Atropine at bedside, pacer pads in place  - Cardiology agrees with atropine for any hemodynamic instability, otherwise no intervention required     Renal:   - BUN/CR: 12/0.9  - CRRT per Nephrology, will discuss with team about transitioning to HD and holding CRRT today  - Trend BMP     Infectious Disease:   - Afebrile, WBC normal   - Immunosuppression with Tacro, Cellcept, MPN  - Opp infection ppx with Bactrim, Valcyte, Nystatin  - Rocephin x7 days for E. Coli+ Bcx in donor  - Trend WBC     Hematology/Oncology:  - H/H stable  - INR 1.5  - Trend coags  - Trend CBC     Endocrine:  - Insulin gtt     Fluids/Electrolytes/Nutrition/GI:   - CLD, advance as tolerated  - Trend CMP  - Replace Lytes PRN      Dispo:  Continue SICU care.   Primary team: Transplant Surgery          Ascites due to alcoholic cirrhosis    - Para 6/25 negative for infection   - Para 7/10 negative for infection. Monitor.           Decompensated hepatic cirrhosis    - Placed status 7 on 6/26 for prescription drug coverage change. Re-activated 7/3. Back on hold d/t frailty and decompensation at the end of last week prompting ICU transfer. Now off hold 7/17.  Plan to submit MELD exception points. Will remeld today at 27.       MELD-Na score: 27 at 7/21/2018  4:01 AM  MELD score: 27 at 7/21/2018  4:01 AM  Calculated from:  Serum Creatinine: 3.7 mg/dL at 7/21/2018  4:00 AM  Serum Sodium: 147 mmol/L (Rounded to 137) at 7/21/2018  4:00 AM  Total Bilirubin: 1.6 mg/dL at 7/21/2018  4:00 AM  INR(ratio): 1.7 at 7/21/2018  4:01 AM  Age: 63 years        Hepatic encephalopathy    - ESLD secondary to EtOH cirrhosis with severe complications (hyperbilirubinemia, hypoalbuminemia, severe malnutrition, hepatic encephalopathy, thrombocytopenia, splenomegaly, ascites, hypervolemia, coaguloopathy, portal HTN), seizure disorder on Keppra, CKD3, CAD   - has frequent episodes of lethargy/confusion/slowing when holding lactulose    - admitted to SICU 7/13/18 for worsening encephalopathy and hypothermia  - PO lactulose TID, PRN lactulose enemas, Rifaximin  - sleepy this AM. Given lactulose enema with improvement in mentation. Continue oral lactulose and PRN lactulose enemas for HE control.           Orthopedic   Hypothermia    - with episodes of hypothermia on 7/17 and 7/18.   - BS antibxs on board for prophylaxis.   - blood cultures NGTD.   - ID consulted for further management.           Other   Organ transplant candidate              Physical deconditioning    - PT/OT consulted.    - Encourage OOBTC for several hours each day, walking with walker with PT        Hypoalbuminemia due to protein-calorie malnutrition    - see severe protein-calorie malnutrition         Bilateral edema of lower extremity    - Completed vanc x 7 days for BLE cellulitis  - 2D Echo 7/9 normal EF (55-60%), trivial tricuspid/mitral regurg.  - Vanc now restarted given recent episodes of hypothermia.   -Started trial of lasix per Nephrology.             The patients clinical status was discussed at multidisplinary rounds, involving transplant surgery, transplant medicine, pharmacy, nursing, nutrition, and social work.    Terry Huff MD  Liver Transplant  [facility]

## 2018-07-27 NOTE — PLAN OF CARE
Problem: Occupational Therapy Goal  Goal: Occupational Therapy Goal  Goals to be met by: 8/10/18    Patient will increase functional independence with ADLs by performing:     Upper body dressing while seated EOB with SBA  Grooming while standing at sink with Stand-by Assistance  Toileting from bedside commode with Minimal Assistance for hygiene and clothing management.  Toilet transfer to bedside commode with Contact Guard Assistance.  Pt will complete a functional static standing activity x ~4 minutes with CGA.   LB Dressing with Moderate Assistance          Outcome: Ongoing (interventions implemented as appropriate)  OT re-eval completed, and above goals established. ERROL Delacruz  7/27/2018

## 2018-07-27 NOTE — SUBJECTIVE & OBJECTIVE
Interval History/Significant Events: Junctional bradycardia  bradycardia per cardio. A-line placed     Follow-up For: Procedure(s) (LRB):  TRANSPLANT, LIVER (N/A)    Post-Operative Day: 4 Days Post-Op    Objective:     Vital Signs (Most Recent):  Temp: 98.2 °F (36.8 °C) (07/27/18 0700)  Pulse: (!) 43 (07/27/18 0800)  Resp: 13 (07/27/18 0800)  BP: (!) 86/48 (07/27/18 0800)  SpO2: 100 % (07/27/18 0800) Vital Signs (24h Range):  Temp:  [98.2 °F (36.8 °C)-98.6 °F (37 °C)] 98.2 °F (36.8 °C)  Pulse:  [39-55] 43  Resp:  [9-27] 13  SpO2:  [87 %-100 %] 100 %  BP: ()/(46-64) 86/48  Arterial Line BP: ()/(34-56) 119/37     Weight: 130 kg (286 lb 9.6 oz)  Body mass index is 36.8 kg/m².      Intake/Output Summary (Last 24 hours) at 07/27/18 0834  Last data filed at 07/27/18 0800   Gross per 24 hour   Intake             7823 ml   Output             6547 ml   Net             1276 ml       Physical Exam   Constitutional: He is oriented to person, place, and time.   Appears severely ill, bitemporal wasting   HENT:   Head: Normocephalic.   Right Ear: External ear normal.   Left Ear: External ear normal.   Eyes: EOM are normal. Pupils are equal, round, and reactive to light. Right eye exhibits no discharge. Left eye exhibits no discharge.   Cardiovascular: Regular rhythm and normal heart sounds.  Exam reveals no gallop and no friction rub.    No murmur heard.  Sinus bradycardia   Pulmonary/Chest: Effort normal and breath sounds normal. No respiratory distress. He has no wheezes. He has no rales. He exhibits no tenderness.   Abdominal: Soft. He exhibits no distension and no mass. There is tenderness. There is no rebound and no guarding. No hernia.   Chevron scar from OLT c/d/i and dressing over former drain site in RLQ; appropriately tender   Genitourinary:   Genitourinary Comments: Severe scrotal edema   Musculoskeletal: He exhibits edema.   Neurological: He is alert and oriented to person, place, and time.   Did not  remember family being at bedside yesterday, was wondering why they had not come   Skin: Skin is warm and dry. No erythema.   Nursing note and vitals reviewed.      Vents:  Vent Mode: Spont (07/25/18 0929)  Set Rate: 16 bmp (07/25/18 0700)  Vt Set: 500 mL (07/23/18 1446)  Pressure Support: 10 cmH20 (07/25/18 0929)  PEEP/CPAP: 5 cmH20 (07/25/18 0929)  Oxygen Concentration (%): 28 (07/27/18 0445)  Peak Airway Pressure: 16 cmH2O (07/25/18 0929)  Plateau Pressure: 12 cmH20 (07/25/18 0929)  Total Ve: 14.1 mL (07/25/18 0929)  Negative Inspiratory Force (cm H2O): -21 (07/25/18 1020)  F/VT Ratio<105 (RSBI): (!) 24.86 (07/25/18 0700)    Lines/Drains/Airways     Central Venous Catheter Line                 Introducer 07/23/18 0500 right internal jugular 4 days         Percutaneous Central Line Insertion/Assessment - double lumen  07/23/18 0500 right internal jugular 4 days         Percutaneous Central Line Insertion/Assessment - triple lumen  07/23/18 0530 right internal jugular 4 days          Drain                 Urethral Catheter 07/21/18 1706 5 days          Arterial Line                 Arterial Line 07/26/18 1500 Right Radial less than 1 day          Peripheral Intravenous Line                 Midline Catheter Insertion/Assessment  - Single Lumen 07/20/18 1128 Right cephalic vein (lateral side of arm) 18g x 10cm 6 days                Significant Labs:    CBC/Anemia Profile:    Recent Labs  Lab 07/26/18  1345 07/26/18  2210 07/27/18  0304   WBC 3.91 4.18 3.91   HGB 7.3* 7.4* 7.4*   HCT 22.7* 22.2* 22.2*   PLT 46* 47* 44*   MCV 98 97 98   RDW 17.8* 17.5* 17.2*        Chemistries:    Recent Labs  Lab 07/26/18  0303  07/26/18  1345 07/26/18  2209 07/27/18  0304     139  < > 138 138 137  137  137   K 4.3  4.3  < > 4.4 4.3 4.3  4.3  4.3     104  < > 103 103 103  103  103   CO2 26  26  < > 29 29 28  28  28   BUN 37*  37*  < > 19 13 12  12  12   CREATININE 2.4*  2.4*  < > 1.4 1.0 0.9  0.9  0.9    CALCIUM 7.5*  7.5*  < > 7.3* 7.0* 7.0*  7.0*  7.0*   ALBUMIN 2.3*  2.3*  --  2.4* 2.3* 2.3*  2.3*   PROT 4.0*  --  4.0*  --  3.8*   BILITOT 1.1*  --  1.4*  --  1.1*   ALKPHOS 112  --  115  --  112   ALT 79*  --  69*  --  63*   AST 59*  --  50*  --  45*   MG 2.1  --   --  1.7 2.0   PHOS 5.4*  5.4*  --  4.1 3.3 3.2  3.2   < > = values in this interval not displayed.    All pertinent labs within the past 24 hours have been reviewed.    Significant Imaging:  I have reviewed and interpreted all pertinent imaging results/findings within the past 24 hours.

## 2018-07-27 NOTE — PLAN OF CARE
Reviewed BG levels.   Endocrinology was consulted for assistance in managing steroid induced hyperglycemia.    Has been off of insulin drip since yesterday.   BG levels are at goal (140-180 mg/dl).     Currently on pureed diet.   Might need coverage with insulin once diet is advanced.     Recommendations:   Might need scheduled insulin once diet is advanced.  Low dose correction  POC AC/HS

## 2018-07-27 NOTE — PT/OT/SLP RE-EVAL
Occupational Therapy   Re-evaluation    Name: Alon Adamson  MRN: [id number]  Admitting Diagnosis:  Liver transplanted 4 Days Post-Op    Recommendations:     Discharge Recommendations: nursing facility, skilled  Discharge Equipment Recommendations:   (TBD closer to d/c)  Barriers to discharge:  Decreased caregiver support, Inaccessible home environment (at current functional level)    History:     Past Medical History:   Diagnosis Date    Anticoagulant long-term use     Arthritis     Back pain     Cellulitis     Cirrhosis     Coronary artery disease     DM (diabetes mellitus)     Hearing loss     right ear    Hepatic encephalopathy     Hypertension     diagnosed today (11/25/2015) and prescribed toprol    Leg edema     Nephrolithiasis     JACK (obstructive sleep apnea)     Seizures     Splenomegaly        Past Surgical History:   Procedure Laterality Date    APPENDECTOMY      Open    BACK SURGERY      CHOLECYSTECTOMY      laprascopic    COLONOSCOPY N/A 1/25/2018    Procedure: COLONOSCOPY;  Surgeon: Lashawn Myles MD;  [facility] ([facility]);  Service: Endoscopy;  Laterality: N/A;    LIVER TRANSPLANT N/A 7/23/2018    Procedure: TRANSPLANT, LIVER;  Surgeon: Alma Joyce MD;  Location: [facility];  Service: Transplant;  Laterality: N/A;    LUMBAR DISCECTOMY      SPLENIC ARTERY EMBOLIZATION  04/08/2016    Dr Taveras    TONSILLECTOMY         Subjective     Chief Complaint: weakness  Patient/Family stated goals:to get better  Communicated with: RN prior to session.  Pain/Comfort:  · Pain Rating 1: 8/10  · Location - Side 1: Bilateral  · Location - Orientation 1: generalized  · Location 1: leg  · Pain Addressed 1: Reposition, Distraction  · Pain Rating Post-Intervention 1: 8/10    Objective:     Patient found with: bowel management system, arterial line, blood pressure cuff, central line, pulse ox (continuous), telemetry, griffin catheter (Midline)    General Precautions: Standard,  fall   Orthopedic Precautions:N/A   Braces:       Occupational Performance:    Bed Mobility:    · Patient completed Rolling/Turning to Left with  total assistance x 2  · Patient completed Rolling/Turning to Right with total assistance x 2  · Patient completed Scooting/Bridging with total assistance x 2  · Patient completed Supine to Sit with total assistance x 2  · Patient completed Sit to Supine with total assistance x 2    Functional Mobility/Transfers:  · NT    Activities of Daily Living:  · Grooming: total assistance    · Upper Body Dressing: total assistance    · Lower Body Dressing: total assistance    · Toileting: total assistance      Cognitive/Visual Perceptual:  Pt is alert and following commands  Daily orientation provided    Physical Exam:  Postural examination/scapula alignment:    -       No postural abnormalities identified  Sensation:    -       Intact  Upper Extremity Range of Motion:     -       Right Upper Extremity: AAROM WFL  -       Left Upper Extremity: AAROM WFL  Upper Extremity Strength:    -       Right Upper Extremity: 3-/5  -       Left Upper Extremity: 3-/5   Strength:    -       Right Upper Extremity: Fair  -       Left Upper Extremity: Fair  Skin  -      Erythema and swelling in four extremities    Patient left right sidelying with all lines intact, call button in reach,  RN notified and family present    [facility] 6 Click:  [facility] Total Score: 6    Treatment & Education:  Pt ed on OT POC  Pt sat EOB x 15 min with total A while engaged in self-care and  BUE AAROM ex's x 10 reps in all planes  Pt/family ed on PROM x 10 reps throughout the day  Education:    Assessment:     Alon Adamson is a 63 y.o. male with a medical diagnosis of Liver transplanted.  He presents with profound weakness and debility.  Performance deficits affecting function are weakness, gait instability, impaired balance, impaired endurance, impaired self care skills, impaired functional mobilty, pain, edema,  "impaired skin, decreased lower extremity function, decreased upper extremity function, decreased ROM, impaired cardiopulmonary response to activity.      Rehab Prognosis:  Good; patient would benefit from acute skilled OT services to address these deficits and reach maximum level of function.         Clinical Decision Makin.  OT Mod:  "Pt evaluation falls under moderate complexity for evaluation coding due to identification of 3-5 performance deficits noted as stated above. Eval required Min/Mod assistance to complete on this date and detailed assessment(s) were utilized. Moreover, an expanded review of history and occupational profile obtained with additional review of cognitive, physical and psychosocial hx."     Plan:     Patient to be seen 5 x/week, 4 x/week to address the above listed problems via self-care/home management, therapeutic activities, therapeutic exercises  · Plan of Care Expires: 18  · Plan of Care Reviewed with: patient, spouse, son    This Plan of care has been discussed with the patient who was involved in its development and understands and is in agreement with the identified goals and treatment plan    GOALS:    Occupational Therapy Goals        Problem: Occupational Therapy Goal    Goal Priority Disciplines Outcome Interventions   Occupational Therapy Goal     OT, PT/OT Ongoing (interventions implemented as appropriate)    Description:  Goals to be met by: 8/10/18    Patient will increase functional independence with ADLs by performing:     Upper body dressing while seated EOB with SBA  Grooming while standing at sink with Stand-by Assistance  Toileting from bedside commode with Minimal Assistance for hygiene and clothing management.  Toilet transfer to bedside commode with Contact Guard Assistance.  Pt will complete a functional static standing activity x ~4 minutes with CGA.   LB Dressing with Moderate Assistance                            Time Tracking:     OT Date of " Treatment: 07/27/18  OT Start Time: 1024  OT Stop Time: 1049  OT Total Time (min): 25 min    Billable Minutes:Re-eval 10  Therapeutic Exercise 15    ERROL Delacruz  7/27/2018

## 2018-07-27 NOTE — SUBJECTIVE & OBJECTIVE
Interval History: NAEON  Patient received CRRT w/o any event. Patient is still bradycardic, cardiology suggested Atropin prn if symptomatic. MAP 65 (cuff), no electrolyte disarray present. BUN 12, Cr 0.9  anuric    Review of patient's allergies indicates:   Allergen Reactions    Nsaids (non-steroidal anti-inflammatory drug)      D/t to liver disease.    Tylenol [acetaminophen]      D/t liver disease.    Penicillins Other (See Comments)     Tolerated pip-tazo in 8/2016 without issue  Tolerated cefepime 7/2018 without issue  Since childhood was told not to take it     Current Facility-Administered Medications   Medication Frequency    0.9%  NaCl infusion (CRRT USE ONLY) Continuous    0.9%  NaCl infusion (for blood administration) Q24H PRN    0.9%  NaCl infusion (for blood administration) Q24H PRN    acyclovir capsule 400 mg BID    ascorbic acid (vitamin C) tablet 1,000 mg Daily    atorvastatin tablet 40 mg Daily    cefTRIAXone (ROCEPHIN) 2 g in dextrose 5 % 50 mL IVPB Q24H    dextrose 50% injection 12.5 g PRN    dextrose 50% injection 25 g PRN    diphenhydrAMINE-zinc acetate 1-0.1% cream TID PRN    docusate sodium capsule 100 mg Daily    ergocalciferol capsule 50,000 Units Q7 Days    fluconazole tablet 200 mg Daily    gelatin adsorbable 100cm top sponge 100 sponge 1 applicator PRN    glucagon (human recombinant) injection 1 mg PRN    glucose chewable tablet 16 g PRN    glucose chewable tablet 24 g PRN    heparin (porcine) injection 5,000 Units Q8H    levETIRAcetam in NaCl (iso-os) IVPB 500 mg Q12H    lidocaine (PF) 10 mg/ml (1%) injection 10 mg Once    magnesium sulfate 2g in water 50mL IVPB (premix) PRN    methylPREDNISolone sodium succinate injection 40 mg Q12H    Followed by    [START ON 7/28/2018] methylPREDNISolone sodium succinate injection 20 mg Q12H    Followed by    [START ON 7/29/2018] predniSONE tablet 20 mg Daily    metoclopramide HCl injection 10 mg QID (AC + HS) PRN     mycophenolate mofetil 200 mg/mL suspension 1,000 mg BID    ondansetron injection 4 mg Q8H PRN    oxyCODONE immediate release tablet 10 mg Q6H PRN    oxyCODONE immediate release tablet 5 mg Q6H PRN    pantoprazole 40 mg in dextrose 5 % 100 mL IVPB (over 15 minutes) (ready to mix system) BID    sertraline tablet 50 mg Daily    sodium chloride 0.9% flush 3 mL PRN    sodium phosphate 20.01 mmol in dextrose 5 % 250 mL IVPB PRN    sodium phosphate 30 mmol in dextrose 5 % 250 mL IVPB PRN    sodium phosphate 39.99 mmol in dextrose 5 % 250 mL IVPB PRN    sulfamethoxazole-trimethoprim 400-80mg per tablet 1 tablet Daily    tacrolimus (PROGRAF) 1 mg/mL oral syringe BID    triamcinolone acetonide 0.1% cream BID    zinc sulfate capsule 220 mg BID       Objective:     Vital Signs (Most Recent):  Temp: 98.2 °F (36.8 °C) (07/27/18 0700)  Pulse: (!) 43 (07/27/18 0800)  Resp: 13 (07/27/18 0800)  BP: (!) 86/48 (07/27/18 0800)  SpO2: 100 % (07/27/18 0800)  O2 Device (Oxygen Therapy): nasal cannula (07/27/18 0800) Vital Signs (24h Range):  Temp:  [98.2 °F (36.8 °C)-98.6 °F (37 °C)] 98.2 °F (36.8 °C)  Pulse:  [39-55] 43  Resp:  [9-27] 13  SpO2:  [87 %-100 %] 100 %  BP: ()/(46-64) 86/48  Arterial Line BP: ()/(34-56) 119/37     Weight: 130 kg (286 lb 9.6 oz) (07/26/18 0400)  Body mass index is 36.8 kg/m².  Body surface area is 2.61 meters squared.    I/O last 3 completed shifts:  In: 8124 [P.O.:240; I.V.:7656; Blood:228]  Out: 9219 [Urine:110; Drains:90; Other:9019]    Physical Exam   Constitutional: He appears well-developed and well-nourished. No distress.   Neck: Neck supple. No JVD present.   Cardiovascular: Regular rhythm.  Exam reveals no friction rub.    Murmur heard.  bradycardic   Pulmonary/Chest: Effort normal and breath sounds normal. No respiratory distress. He has no wheezes. He has no rales.   Abdominal: Soft.   Musculoskeletal: He exhibits edema.   Neurological: He is alert.   AOx2   Skin: Skin is  warm and dry. Capillary refill takes 2 to 3 seconds. He is not diaphoretic. There is erythema.       Significant Labs:  CBC:   Recent Labs  Lab 07/27/18  0304   WBC 3.91   RBC 2.27*   HGB 7.4*   HCT 22.2*   PLT 44*   MCV 98   MCH 32.6*   MCHC 33.3     CMP:   Recent Labs  Lab 07/27/18  0304   *  146*  146*   CALCIUM 7.0*  7.0*  7.0*   ALBUMIN 2.3*  2.3*   PROT 3.8*     137  137   K 4.3  4.3  4.3   CO2 28  28  28     103  103   BUN 12  12  12   CREATININE 0.9  0.9  0.9   ALKPHOS 112   ALT 63*   AST 45*   BILITOT 1.1*

## 2018-07-27 NOTE — ASSESSMENT & PLAN NOTE
Patient received CRRT yesterday w/o any event. Patient is still bradycardic, cardiology suggested Atropin prn if symptomatic. MAP 65 (cuff), no electrolyte disarray present. BUN 12, Cr 0.9  Anuric  - CRRT rate 300 ml/hr  - will consider pressor if needed to keep patient's MAP>65 during CRRT  - continue to monitor serial RFPs and strict Is & Os  - Avoid nephrotoxic medications  - Hb > 7gm/dL

## 2018-07-27 NOTE — ASSESSMENT & PLAN NOTE
63 year old male with ESLD 2/2 EtOH and ESRD s/p liver transplant 7/23/18    Neuro:   - Sedation: None  - Pain control: PRN PO Oxycodone     Pulmonary:   - 2LNC; wean as tolerated  - CXR today  - IS/OOBTC     Cardiac:  - Drips: None  - Vasopressin weaned off  - New asymptomatic bradycardia. Junctional bradycardia on EKG  - Atropine at bedside, pacer pads in place  - Cardiology agrees with atropine for any hemodynamic instability, otherwise no intervention required     Renal:   - BUN/CR: 12/0.9  - CRRT per Nephrology, will discuss with team about transitioning to HD and holding CRRT today  - Trend BMP     Infectious Disease:   - Afebrile, WBC normal   - Immunosuppression with [place], Cellcept, MPN  - Opp infection ppx with Bactrim, Valcyte, Nystatin  - Rocephin x7 days for E. Coli+ Bcx in donor  - Trend WBC     Hematology/Oncology:  - H/H stable  - INR 1.5  - Trend coags  - Trend CBC     Endocrine:  - Insulin gtt     Fluids/Electrolytes/Nutrition/GI:   - CLD, advance as tolerated  - Trend CMP  - Replace Lytes PRN      Dispo:  Continue SICU care.   Primary team: Transplant Surgery

## 2018-07-27 NOTE — PROGRESS NOTES
" [facility]  Adult Nutrition  Progress Note    SUMMARY       Recommendations  Recommendation/Intervention:   1. As medically able, ADAT to Diabetic with texture per SLP.   2. TSU RD to provide post-transplant diet education when appropriate.   RD to monitor.    Goals: Patient to receive nutrition by RD follow-up  Nutrition Goal Status: progressing towards goal  Communication of RD Recs:  (POC)    Reason for Assessment  Reason for Assessment: RD follow-up  Diagnosis: transplant/postoperative complications (s/p OLTx 7/23)  Relevant Medical History: ESLD 2/2 alcoholic cirrhosis, CAD, DM, HTN, seizures  Interdisciplinary Rounds: did not attend  General Information Comments: S/p OLTx 7/23. Extubated 7/25. Started on CL yesterday. On CRRT.  Nutrition Discharge Planning: TSU RD to provide post-transplant diet education when appropriate.    Nutrition Risk Screen  Nutrition Risk Screen: no indicators present    Nutrition/Diet History  Patient Reported Diet/Restrictions/Preferences: low salt  Typical Food/Fluid Intake: Patient with decreased PO itnake prior to surgery.  Food Preferences: No coffee, no tea, likes Jell-O  Do you have any cultural, spiritual, Gnosticist conflicts, given your current situation?: none  Food Allergies: NKFA  Factors Affecting Nutritional Intake: clear liquid diet    Anthropometrics  Temp: 98.3 °F (36.8 °C)  Height: 6' 2" (188 cm)  Height (inches): 74 in  Weight Method: Bed Scale  Weight: 130 kg (286 lb 9.6 oz)  Weight (lb): 286.6 lb  Ideal Body Weight (IBW), Male: 190 lb  % Ideal Body Weight, Male (lb): 146.78 lb  BMI (Calculated): 35.9  BMI Grade: 35 - 39.9 - obesity - grade II  Usual Body Weight (UBW), kg: (!) 139 kg (admit weight - 6/23)  % Usual Body Weight: 91.05  % Weight Change From Usual Weight: -9.14 %    Lab/Procedures/Meds  Pertinent Labs Reviewed: reviewed  Pertinent Labs Comments: Glu 146, POCT Glu 139-176, HgbA1c 5.2, Ca 7.0, Alb 2.3  Pertinent Medications Reviewed: " reviewed  Pertinent Medications Comments: vitamin C, docusate, methylprednisolone, pantoprazole, prograf    Physical Findings/Assessment  Overall Physical Appearance: nourished, on oxygen therapy  Oral/Mouth Cavity: tooth/teeth missing  Skin: dermatitis, edema, incision(s)    Estimated/Assessed Needs  Weight Used For Calorie Calculations: 126.3 kg (278 lb 7.1 oz)  Energy Calorie Requirements (kcal): 2346 kcal/day  Energy Need Method: [place]-St Jeor (x 1.1)  Protein Requirements: 127-152 g/day (1.0-1.2 g/kg)  Weight Used For Protein Calculations: 126.3 kg (278 lb 7.1 oz)  Fluid Requirements (mL): 1 mL/kcal or per MD  Fluid Need Method: RDA Method  RDA Method (mL): 2346    Nutrition Prescription Ordered  Current Diet Order: Clear Liquid    Evaluation of Received Nutrient/Fluid Intake  I/O: +1.4L x 24hrs, +27.4L since admit  Comments: LBM 7/27  % Intake of Estimated Energy Needs: 0 - 25 %  % Meal Intake: Other: CL diet    Nutrition Risk  Level of Risk/Frequency of Follow-up: high (2x/week)     Assessment and Plan  Severe protein-calorie malnutrition     Contributing Nutrition Diagnosis  Malnutrition     Related to (etiology):   Chronic Illness/Injury     Signs and Symptoms (as evidenced by):  Energy Intake: <50% of estimated energy requirement for 1 month  Weight Loss: 9% x 1 month   Fluid Accumulation: moderate     Interventions/Recommendations (treatment strategy):  Full assessment completed, see RD Note 7/27/2018.     Nutrition Diagnosis Status:  Continues     Monitor and Evaluation  Food and Nutrient Intake: energy intake  Food and Nutrient Adminstration: diet order  Knowledge/Beliefs/Attitudes: food and nutrition knowledge/skill  Physical Activity and Function: nutrition-related ADLs and IADLs  Anthropometric Measurements: weight, weight change  Biochemical Data, Medical Tests and Procedures: electrolyte and renal panel, gastrointestinal profile, glucose/endocrine profile, inflammatory profile  Nutrition-Focused  Physical Findings: overall appearance, skin     Nutrition Follow-Up  RD Follow-up?: Yes

## 2018-07-27 NOTE — PROGRESS NOTES
PT became hypotensive; CRRT was paused and vaso was ordered to be sent from pharmacy. Dr. Huff called to bedside to assess pt. BP is currently coming up and vaso was started @ 0.04 and CRRT was restarted also. Will continue to monitor pt closely.

## 2018-07-27 NOTE — PROGRESS NOTES
CRRT:    Daily checks done,   Access: right IJ temporary catheter  orders verified, UF rate @300ml/hr

## 2018-07-27 NOTE — SUBJECTIVE & OBJECTIVE
Interval History:  CRRT overnight without requiring pressor support. Tolerating CLD. Has not ambulated to chair yet. Denies any CP, SOB, abdominal pain, N/V/F/C.    Scheduled Meds:   acyclovir  400 mg Oral BID    ascorbic acid (vitamin C)  1,000 mg Oral Daily    atorvastatin  40 mg Oral Daily    cefTRIAXone (ROCEPHIN) IVPB  2 g Intravenous Q24H    docusate sodium  100 mg Oral Daily    ergocalciferol  50,000 Units Oral Q7 Days    fluconazole  200 mg Oral Daily    heparin (porcine)  5,000 Units Subcutaneous Q8H    levetiracetam IVPB  500 mg Intravenous Q12H    lidocaine (PF) 10 mg/ml (1%)  1 mL Injection Once    methylPREDNISolone sodium succinate  40 mg Intravenous Q12H    Followed by    [START ON 7/28/2018] methylPREDNISolone sodium succinate  20 mg Intravenous Q12H    Followed by    [START ON 7/29/2018] predniSONE  20 mg Oral Daily    mycophenolate mofetil  1,000 mg Oral BID    pantoprazole 40 mg in dextrose 5 % 100 mL IVPB (over 15 minutes) (ready to mix system)  40 mg Intravenous BID    sulfamethoxazole-trimethoprim 400-80mg  1 tablet Oral Daily    tacrolimus  1 mg Oral BID    triamcinolone acetonide 0.1%   Topical (Top) BID    zinc sulfate  220 mg Oral BID     Continuous Infusions:   sodium chloride 0.9% 200 mL/hr at 07/27/18 1000     PRN Meds:sodium chloride, sodium chloride, dextrose 50%, dextrose 50%, diphenhydrAMINE-zinc acetate 1-0.1%, gelatin adsorbable 100cm top sponge, glucagon (human recombinant), glucose, glucose, magnesium sulfate IVPB, metoclopramide HCl, ondansetron, oxyCODONE, oxyCODONE, sodium chloride 0.9%, sodium phosphate IVPB, sodium phosphate IVPB, sodium phosphate IVPB    Review of Systems   HENT: Negative.    Respiratory: Negative.    Cardiovascular: Negative.    Gastrointestinal: Positive for abdominal pain.   Genitourinary: Negative.    Musculoskeletal: Positive for back pain and joint swelling.   Skin: Positive for wound.   Neurological: Negative.     Psychiatric/Behavioral: Positive for confusion.     Objective:     Vital Signs (Most Recent):  Temp: 98.2 °F (36.8 °C) (07/27/18 0700)  Pulse: (!) 38 (07/27/18 1000)  Resp: 18 (07/27/18 1000)  BP: (!) 93/46 (07/27/18 1000)  SpO2: 100 % (07/27/18 1000) Vital Signs (24h Range):  Temp:  [98.2 °F (36.8 °C)-98.6 °F (37 °C)] 98.2 °F (36.8 °C)  Pulse:  [38-55] 38  Resp:  [9-27] 18  SpO2:  [87 %-100 %] 100 %  BP: ()/(46-64) 93/46  Arterial Line BP: ()/(34-56) 120/43     Weight: 130 kg (286 lb 9.6 oz)  Body mass index is 36.8 kg/m².    Intake/Output - Last 3 Shifts       07/25 0700 - 07/26 0659 07/26 0700 - 07/27 0659 07/27 0700 - 07/28 0659    P.O.  240 530    I.V. (mL/kg) 1023 (7.9) 7355 (56.6)     Blood 505 228     NG/GT   60    Total Intake(mL/kg) 1528 (11.8) 7823 (60.2) 590 (4.5)    Urine (mL/kg/hr) 102 (0) 65 (0) 0 (0)    Drains 242 (0.1) 50 (0)     Other 2644 (0.8) 6375 (2) 1177 (2.4)    Total Output 2988 6490 1177    Net -1460 +1333 -587           Stool Occurrence 2 x 3 x 1 x          Physical Exam   Constitutional: He is oriented to person, place, and time. He appears well-developed and well-nourished.   HENT:   Head: Normocephalic and atraumatic.   Eyes: No scleral icterus.   Cardiovascular: Normal rate and regular rhythm.    No murmur heard.  Pulmonary/Chest: Effort normal and breath sounds normal. He has no wheezes.   Abdominal: Soft. He exhibits no distension.   Abdominal incision c/d/i with staples in place   Musculoskeletal: He exhibits edema.   Lymphadenopathy:     He has no cervical adenopathy.   Neurological: He is alert and oriented to person, place, and time.   Skin: Skin is warm and dry. He is not diaphoretic. There is erythema.   Dry flaking skin throughout       Laboratory:  Immunosuppressants         Stop Route Frequency     tacrolimus (PROGRAF) 1 mg/mL oral syringe      -- Oral 2 times daily     mycophenolate mofetil 200 mg/mL suspension 1,000 mg      -- Oral 2 times daily        CBC:      Recent Labs  Lab 07/27/18 0304   WBC 3.91   RBC 2.27*   HGB 7.4*   HCT 22.2*   PLT 44*   MCV 98   MCH 32.6*   MCHC 33.3     CMP:     Recent Labs  Lab 07/27/18 0304   *  146*  146*   CALCIUM 7.0*  7.0*  7.0*   ALBUMIN 2.3*  2.3*   PROT 3.8*     137  137   K 4.3  4.3  4.3   CO2 28  28  28     103  103   BUN 12  12  12   CREATININE 0.9  0.9  0.9   ALKPHOS 112   ALT 63*   AST 45*   BILITOT 1.1*     Coagulation:     Recent Labs  Lab 07/27/18 0304   INR 1.5*   APTT 29.8     ABGs:     Recent Labs  Lab 07/26/18  0521   PH 7.474*   PCO2 39.3   HCO3 28.9*   POCSATURATED 65*   BE 5     Labs within the past 24 hours have been reviewed.    Diagnostic Results:  I have personally reviewed all pertinent imaging studies.

## 2018-07-27 NOTE — PROGRESS NOTES
Pt became bradycardic with a HR of 31 sustained for approximately a minute; arterial line BP was stable with a systolic of 142; but a cuff pressure was unable to be obtained. Dr. Grimm was called to bedside to assess pt and no new orders were received. Will continue to monitor pt closely. See flow sheets for specific details.

## 2018-07-27 NOTE — ASSESSMENT & PLAN NOTE
63 yr old male with decompensated alcoholic cirrhosis, CKD III and CAD. Hospitalized with HE, VICKY on CKD. S/p OLT on 7/23 with intraop CRRT. Brought to the ICU intubated, not requiring pressor support. Significant blood loss, with aggressive resuscitation intraop    Neuro  -hx seizures and HE  -keppra  -AAOX4, but did not remember family being at bedside yesterday    Cardiovascular  - cards consulted for junctional bradycardia; determined it was sinus bradycardia 2/2 electrolyte derangement and recommended keeping atropine at bedside in case it becomes symptomatic  - h/o CAD  - 2D Echo 7/9 normal EF (55-60%), trivial tricuspid/mitral regurg  - vasopressin weaned off 7/25    Respiratory:  - extubated 7/25  - no acute issues    Renal:  -VICKY on CKD-intraop CRRT, anuric  -Nephrology following, on CRRT, which he received last night  -[place], BUN/Cr    Liver and Pancreas  -s/p OLT 7/23   -Trend liver enzymes, enzymes and Tbili improving, but PT/INR worse than yesterday  -PLTs low.  Transfuse per Transplant Surgery    GI  -Clear liquids  -Replete lytes prn       ID  -Anti-rejection, antiretrovirals as per primary  -monitor WBC    Heme  -h/h stable, thrombocytopenia. 14 L blood loss intraop.   RBC (Units) 9   FFP (Units) 2   Cryoprecipitate (Units)  2   Platelets (Units) 2   -PLT transfusion per Transplant Surgery  -some heme output in OG tube, continue to monitor CBC and output  -Hep 5k DVT ppx    Dispo  -Primary team Transplant Surgery  -Monitor platelets, any bleeding, and other LFTs  -Vasopressin weaned, switch CRRT to HD when able  -Continue SICU care

## 2018-07-27 NOTE — PROGRESS NOTES
[facility]  Critical Care - Surgery  Progress Note    Patient Name: Alon Adamson  MRN: [id number]  Admission Date: 6/22/2018  Hospital Length of Stay: 35 days  Code Status: Full Code  Attending Provider: Williams Moon Jr., MD  Primary Care Provider: Mckinley Vides MD   Principal Problem: Liver transplanted    Subjective:     Hospital/ICU Course:  Per tranplant, Patient is listed for liver transplant, placed on internal hold 6/25 for HE then status 7 on 6/26 due to change in prescription coverage. He was re-activated on the list 7/3, MELD 22. Pt started on IV diuretics 6/22 and continued 6/23 and 6/24.  BLE edema and ascites sign if improved with diuresis and kidney function also improved. During admission, patient had worsening encephalopathy and asterixis prompting infectious work up, all blood and urine cultures obtained during admission with NGTD. Intermittently, encephalopathy severe enough to warrant lactulose enemas. CT head obtained and negative. Paracentesis 6/25 negative for peritonitis per cell count and U/A negative. Pt also with concern for BLE cellulitis- upper legs bright red with lower BLE still with dark appearance of venous stasis. Placed on vancomycin with improvement in BLE redness 6/26. Vanc continued for 7 days (ended 7/1). His HE waxed and waned for several days requiring lactulose enemas. Pt eating minimally during period of encephalopathy requiring gentle hydration with IVF 6/26. Of note, taco placement attempted 6/26 but unable secondary to agitation upon insertion prompting self resolving nose bleed. Micro lab called 6/26 PM with blood cx + for G+C in blood- pt continued on vanc which was started 6/25. ID consulted, suspect + blood cultures contaminant. EEG obtained 6/26 as pt with h/o serizures and negative. Pt continued on home Keppra. VICKY on admission initially improved with diuresis, but Cr sakina intermittently during admission likely related to aggressive  diuresis. Of note, with chest pain overnight 6/26-6/27 that resolved without intervention and pt reported as mild.  EKG NSR with PVC, troponin 0.1 in setting of hypervolemia + VICKY.  Normal dobutamine stress 1/2018.  Cardiology consulted and felt not ACS, no need for further workup other than continue to treat current risk factors for CAD with ASA (pt already on ASA and statin as outpt) + BB for portal hypertension which was started. Repeat paracentesis 7/10, 4.8L off, no SBP. Hypernatremia noted 7/9, nephrology consulted to aid in management. Hypernatremia attributed to dehydration from frequent BM's, diuretics were held, d/c'd creon due to excessive diarrhea, and patient was treated with increasing po free water and IVF. Echo 7/9 with normal EF, no wall abnormality, mild tricuspid/mitral regurg.     Overnight from 7/12 to 7/13, pt became hypothermic to 91.2 corrected with warming blankets, experienced worsening encephalopathy, hypotensive to 80/40.  Transferred to SICU.  Corrected with albumin 5% bolus.  Given additional lactulose.    7/14- no acute issues, ammonia level decreased, mentation improving    7/23-OLT  7/24 - unable to tolerate full CRRT due to hypotension, on vasopressin 0.04, unable to wean vent due to mental status, pulm function good, low PLT being monitored, no acute bleed.  AST/ALT improving.  7/25 - extubated, vasopressin weaned off, received CRRT, 1U PLT    Interval History/Significant Events: Junctional bradycardia  bradycardia per cardio. A-line placed     Follow-up For: Procedure(s) (LRB):  TRANSPLANT, LIVER (N/A)    Post-Operative Day: 4 Days Post-Op    Objective:     Vital Signs (Most Recent):  Temp: 98.2 °F (36.8 °C) (07/27/18 0700)  Pulse: (!) 43 (07/27/18 0800)  Resp: 13 (07/27/18 0800)  BP: (!) 86/48 (07/27/18 0800)  SpO2: 100 % (07/27/18 0800) Vital Signs (24h Range):  Temp:  [98.2 °F (36.8 °C)-98.6 °F (37 °C)] 98.2 °F (36.8 °C)  Pulse:  [39-55] 43  Resp:  [9-27] 13  SpO2:  [87 %-100  %] 100 %  BP: ()/(46-64) 86/48  Arterial Line BP: ()/(34-56) 119/37     Weight: 130 kg (286 lb 9.6 oz)  Body mass index is 36.8 kg/m².      Intake/Output Summary (Last 24 hours) at 07/27/18 0834  Last data filed at 07/27/18 0800   Gross per 24 hour   Intake             7823 ml   Output             6547 ml   Net             1276 ml       Physical Exam   Constitutional: He is oriented to person, place, and time.   Appears severely ill, bitemporal wasting   HENT:   Head: Normocephalic.   Right Ear: External ear normal.   Left Ear: External ear normal.   Eyes: EOM are normal. Pupils are equal, round, and reactive to light. Right eye exhibits no discharge. Left eye exhibits no discharge.   Cardiovascular: Regular rhythm and normal heart sounds.  Exam reveals no gallop and no friction rub.    No murmur heard.  Sinus bradycardia   Pulmonary/Chest: Effort normal and breath sounds normal. No respiratory distress. He has no wheezes. He has no rales. He exhibits no tenderness.   Abdominal: Soft. He exhibits no distension and no mass. There is tenderness. There is no rebound and no guarding. No hernia.   Chevron scar from OLT c/d/i and dressing over former drain site in RLQ; appropriately tender   Genitourinary:   Genitourinary Comments: Severe scrotal edema   Musculoskeletal: He exhibits edema.   Neurological: He is alert and oriented to person, place, and time.   Did not remember family being at bedside yesterday, was wondering why they had not come   Skin: Skin is warm and dry. No erythema.   Nursing note and vitals reviewed.      Vents:  Vent Mode: Spont (07/25/18 0929)  Set Rate: 16 bmp (07/25/18 0700)  Vt Set: 500 mL (07/23/18 1446)  Pressure Support: 10 cmH20 (07/25/18 0929)  PEEP/CPAP: 5 cmH20 (07/25/18 0929)  Oxygen Concentration (%): 28 (07/27/18 0445)  Peak Airway Pressure: 16 cmH2O (07/25/18 0929)  Plateau Pressure: 12 cmH20 (07/25/18 0929)  Total Ve: 14.1 mL (07/25/18 0929)  Negative Inspiratory Force  (cm H2O): -21 (07/25/18 1020)  F/VT Ratio<105 (RSBI): (!) 24.86 (07/25/18 0700)    Lines/Drains/Airways     Central Venous Catheter Line                 Introducer 07/23/18 0500 right internal jugular 4 days         Percutaneous Central Line Insertion/Assessment - double lumen  07/23/18 0500 right internal jugular 4 days         Percutaneous Central Line Insertion/Assessment - triple lumen  07/23/18 0530 right internal jugular 4 days          Drain                 Urethral Catheter 07/21/18 1706 5 days          Arterial Line                 Arterial Line 07/26/18 1500 Right Radial less than 1 day          Peripheral Intravenous Line                 Midline Catheter Insertion/Assessment  - Single Lumen 07/20/18 1128 Right cephalic vein (lateral side of arm) 18g x 10cm 6 days                Significant Labs:    CBC/Anemia Profile:    Recent Labs  Lab 07/26/18  1345 07/26/18  2210 07/27/18  0304   WBC 3.91 4.18 3.91   HGB 7.3* 7.4* 7.4*   HCT 22.7* 22.2* 22.2*   PLT 46* 47* 44*   MCV 98 97 98   RDW 17.8* 17.5* 17.2*        Chemistries:    Recent Labs  Lab 07/26/18  0303  07/26/18  1345 07/26/18  2209 07/27/18  0304     139  < > 138 138 137  137  137   K 4.3  4.3  < > 4.4 4.3 4.3  4.3  4.3     104  < > 103 103 103  103  103   CO2 26  26  < > 29 29 28  28  28   BUN 37*  37*  < > 19 13 12  12  12   CREATININE 2.4*  2.4*  < > 1.4 1.0 0.9  0.9  0.9   CALCIUM 7.5*  7.5*  < > 7.3* 7.0* 7.0*  7.0*  7.0*   ALBUMIN 2.3*  2.3*  --  2.4* 2.3* 2.3*  2.3*   PROT 4.0*  --  4.0*  --  3.8*   BILITOT 1.1*  --  1.4*  --  1.1*   ALKPHOS 112  --  115  --  112   ALT 79*  --  69*  --  63*   AST 59*  --  50*  --  45*   MG 2.1  --   --  1.7 2.0   PHOS 5.4*  5.4*  --  4.1 3.3 3.2  3.2   < > = values in this interval not displayed.    All pertinent labs within the past 24 hours have been reviewed.    Significant Imaging:  I have reviewed and interpreted all pertinent imaging results/findings within the past  24 hours.    Assessment/Plan:     Hepatic encephalopathy    63 yr old male with decompensated alcoholic cirrhosis, CKD III and CAD. Hospitalized with HE, VICKY on CKD. S/p OLT on 7/23 with intraop CRRT. Brought to the ICU intubated, not requiring pressor support. Significant blood loss, with aggressive resuscitation intraop    Neuro  -hx seizures and HE  -keppra  -AAOX4, but did not remember family being at bedside yesterday    Cardiovascular  - cards consulted for junctional bradycardia; determined it was sinus bradycardia 2/2 electrolyte derangement and recommended keeping atropine at bedside in case it becomes symptomatic  - h/o CAD  - 2D Echo 7/9 normal EF (55-60%), trivial tricuspid/mitral regurg  - vasopressin weaned off 7/25    Respiratory:  - extubated 7/25  - no acute issues    Renal:  -VICKY on CKD-intraop CRRT, anuric  -Nephrology following, on CRRT, which he received last night  -[place], BUN/Cr    Liver and Pancreas  -s/p OLT 7/23   -Trend liver enzymes, enzymes and Tbili improving, but PT/INR worse than yesterday  -PLTs low.  Transfuse per Transplant Surgery    GI  -Clear liquids  -Replete lytes prn       ID  -Anti-rejection, antiretrovirals as per primary  -monitor WBC    Heme  -h/h stable, thrombocytopenia. 14 L blood loss intraop.   RBC (Units) 9   FFP (Units) 2   Cryoprecipitate (Units)  2   Platelets (Units) 2   -PLT transfusion per Transplant Surgery  -some heme output in OG tube, continue to monitor CBC and output  -Hep 5k DVT ppx    Dispo  -Primary team Transplant Surgery  -Monitor platelets, any bleeding, and other LFTs  -Vasopressin weaned, switch CRRT to HD when able  -Continue SICU care             Critical Care Daily Checklist:    A: Awake: RASS Goal/Actual Goal: RASS Goal: 0-->alert and calm  Actual: [place] Agitation Sedation Scale (RASS): Alert and calm   B: Spontaneous Breathing Trial Performed? Spon. Breathing Trial Initiated?: Initiated (07/25/18 1020)   C: SAT & SBT Coordinated?   reviewed                        D: Delirium: CAM-ICU Overall CAM-ICU: Positive   E: Early Mobility Performed? Yes   F: Feeding Goal: Goals: Patient to receive nutrition by RD follow-up  Status: Nutrition Goal Status: new   Current Diet Order   Procedures    Diet clear liquid      AS: Analgesia/Sedation reviewed     T: Thromboembolic Prophylaxis reviewed     H: HOB > 300 Yes   U: Stress Ulcer Prophylaxis (if needed) reviewed     G: Glucose Control reviewed     B: Bowel Function Stool Occurrence: 1   I: Indwelling Catheter (Lines & [place]) Necessity reviewed     D: De-escalation of Antimicrobials/Pharmacotherapies reviewed      Plan for the day/ETD     Code Status:  Family/Goals of Care: Full Code         Critical secondary to Patient has a condition that poses threat to life and bodily function: Acute Renal Failure     Critical care was time spent personally by me on the following activities: development of treatment plan with patient or surrogate and bedside caregivers, discussions with consultants, evaluation of patient's response to treatment, examination of patient, ordering and performing treatments and interventions, ordering and review of laboratory studies, ordering and review of radiographic studies, pulse oximetry, re-evaluation of patient's condition.  This critical care time did not overlap with that of any other provider or involve time for any procedures.     SHELLI Coronado MD  Critical Care - Surgery  [facility]

## 2018-07-27 NOTE — PROGRESS NOTES
[facility]  Nephrology  Progress Note    Patient Name: Alon Adamson  MRN: [id number]  Admission Date: 6/22/2018  Hospital Length of Stay: 35 days  Attending Provider: Williams Moon Jr., MD   Primary Care Physician: Mckinley Vides MD  Principal Problem:Liver transplanted    Subjective:     HPI: Mr. Adamson is 63 yr old male with decompensated alcoholic cirrhosis, CKD III and CAD admitted here on 06/22/18 admitted for bilateral LE hypervolemia and VICKY on CKD III with cr of 2.3 baseline around 1.5. Patient has multiple hospitalization in the past secondary to decompensated liver failure. Patient is currently on transplant team. During current admission patient was getting lasix and albumin intermittently for VICKY however renal function was not getting better. Patient hospital course complicated by hepatic encephalopathy with some improvement of mentation with lactulose. Also treated for cellulitis with 7 days of vancomycin.Nephrology is consulted for worsening/not improving VICKY despite lasix/albumin.     Per chart review patient has no hypotensive episodes, BP mostly above 100's. Has not received nephrotoxins such as NSAIDs/contrast media. No sever sepsis/septic shock or acute blood loss during current admit. UA with 2+ leukocytes and Hyaline casts, no wbc/rbc cast or proteinuria. Urine Na+ < 20.    Patient was transferred to ICU on 7/13/2018 after being more lethargic and hypoactive.  After two days was stepped down back to Transplant [place].       Interval History: NAEON  Patient received CRRT w/o any event. Patient is still bradycardic, cardiology suggested Atropin prn if symptomatic. MAP 65 (cuff), no electrolyte disarray present. BUN 12, Cr 0.9  anuric    Review of patient's allergies indicates:   Allergen Reactions    Nsaids (non-steroidal anti-inflammatory drug)      D/t to liver disease.    Tylenol [acetaminophen]      D/t liver disease.    Penicillins Other (See Comments)      Tolerated pip-tazo in 8/2016 without issue  Tolerated cefepime 7/2018 without issue  Since childhood was told not to take it     Current Facility-Administered Medications   Medication Frequency    0.9%  NaCl infusion (CRRT USE ONLY) Continuous    0.9%  NaCl infusion (for blood administration) Q24H PRN    0.9%  NaCl infusion (for blood administration) Q24H PRN    acyclovir capsule 400 mg BID    ascorbic acid (vitamin C) tablet 1,000 mg Daily    atorvastatin tablet 40 mg Daily    cefTRIAXone (ROCEPHIN) 2 g in dextrose 5 % 50 mL IVPB Q24H    dextrose 50% injection 12.5 g PRN    dextrose 50% injection 25 g PRN    diphenhydrAMINE-zinc acetate 1-0.1% cream TID PRN    docusate sodium capsule 100 mg Daily    ergocalciferol capsule 50,000 Units Q7 Days    fluconazole tablet 200 mg Daily    gelatin adsorbable 100cm top sponge 100 sponge 1 applicator PRN    glucagon (human recombinant) injection 1 mg PRN    glucose chewable tablet 16 g PRN    glucose chewable tablet 24 g PRN    heparin (porcine) injection 5,000 Units Q8H    levETIRAcetam in NaCl (iso-os) IVPB 500 mg Q12H    lidocaine (PF) 10 mg/ml (1%) injection 10 mg Once    magnesium sulfate 2g in water 50mL IVPB (premix) PRN    methylPREDNISolone sodium succinate injection 40 mg Q12H    Followed by    [START ON 7/28/2018] methylPREDNISolone sodium succinate injection 20 mg Q12H    Followed by    [START ON 7/29/2018] predniSONE tablet 20 mg Daily    metoclopramide HCl injection 10 mg QID (AC + HS) PRN    mycophenolate mofetil 200 mg/mL suspension 1,000 mg BID    ondansetron injection 4 mg Q8H PRN    oxyCODONE immediate release tablet 10 mg Q6H PRN    oxyCODONE immediate release tablet 5 mg Q6H PRN    pantoprazole 40 mg in dextrose 5 % 100 mL IVPB (over 15 minutes) (ready to mix system) BID    sertraline tablet 50 mg Daily    sodium chloride 0.9% flush 3 mL PRN    sodium phosphate 20.01 mmol in dextrose 5 % 250 mL IVPB PRN    sodium phosphate  30 mmol in dextrose 5 % 250 mL IVPB PRN    sodium phosphate 39.99 mmol in dextrose 5 % 250 mL IVPB PRN    sulfamethoxazole-trimethoprim 400-80mg per tablet 1 tablet Daily    tacrolimus (PROGRAF) 1 mg/mL oral syringe BID    triamcinolone acetonide 0.1% cream BID    zinc sulfate capsule 220 mg BID       Objective:     Vital Signs (Most Recent):  Temp: 98.2 °F (36.8 °C) (07/27/18 0700)  Pulse: (!) 43 (07/27/18 0800)  Resp: 13 (07/27/18 0800)  BP: (!) 86/48 (07/27/18 0800)  SpO2: 100 % (07/27/18 0800)  O2 Device (Oxygen Therapy): nasal cannula (07/27/18 0800) Vital Signs (24h Range):  Temp:  [98.2 °F (36.8 °C)-98.6 °F (37 °C)] 98.2 °F (36.8 °C)  Pulse:  [39-55] 43  Resp:  [9-27] 13  SpO2:  [87 %-100 %] 100 %  BP: ()/(46-64) 86/48  Arterial Line BP: ()/(34-56) 119/37     Weight: 130 kg (286 lb 9.6 oz) (07/26/18 0400)  Body mass index is 36.8 kg/m².  Body surface area is 2.61 meters squared.    I/O last 3 completed shifts:  In: 8124 [P.O.:240; I.V.:7656; Blood:228]  Out: 9219 [Urine:110; Drains:90; Other:9019]    Physical Exam   Constitutional: He appears well-developed and well-nourished. No distress.   Neck: Neck supple. No JVD present.   Cardiovascular: Regular rhythm.  Exam reveals no friction rub.    Murmur heard.  bradycardic   Pulmonary/Chest: Effort normal and breath sounds normal. No respiratory distress. He has no wheezes. He has no rales.   Abdominal: Soft.   Musculoskeletal: He exhibits edema.   Neurological: He is alert.   AOx2   Skin: Skin is warm and dry. Capillary refill takes 2 to 3 seconds. He is not diaphoretic. There is erythema.       Significant Labs:  CBC:   Recent Labs  Lab 07/27/18  0304   WBC 3.91   RBC 2.27*   HGB 7.4*   HCT 22.2*   PLT 44*   MCV 98   MCH 32.6*   MCHC 33.3     CMP:   Recent Labs  Lab 07/27/18 0304   *  146*  146*   CALCIUM 7.0*  7.0*  7.0*   ALBUMIN 2.3*  2.3*   PROT 3.8*     137  137   K 4.3  4.3  4.3   CO2 28  28  28     103   103   BUN 12  12  12   CREATININE 0.9  0.9  0.9   ALKPHOS 112   ALT 63*   AST 45*   BILITOT 1.1*          Assessment/Plan:     Acute kidney injury superimposed on chronic kidney disease    Patient received CRRT yesterday w/o any event. Patient is still bradycardic, cardiology suggested Atropin prn if symptomatic. MAP 65 (cuff), no electrolyte disarray present. BUN 12, Cr 0.9  Anuric  - CRRT rate 300 ml/hr  - will consider pressor if needed to keep patient's MAP>65 during CRRT  - continue to monitor serial RFPs and strict Is & Os  - Avoid nephrotoxic medications  - Hb > 7gm/dL                      Thank you for your consult. I will follow-up with patient. Please contact us if you have any additional questions.    Chichi Urbina MD  Nephrology  [facility]    ATTENDING PHYSICIAN ATTESTATION  I have personally interviewed and examined the patient. I thoroughly reviewed the demographic, clinical, laboratorial and imaging information available in medical records. I agree with the assessment and recommendations provided by the subspecialty resident. Dr. Urbina was under my supervision.     HEMODIALYSIS NOTE  Patient evaluated while undergoing SLED hemodialysis indicated for unresolved VICKY. Tolerating session with current UFR, no complications.

## 2018-07-28 LAB
ABO + RH BLD: NORMAL
ALBUMIN SERPL BCP-MCNC: 2.2 G/DL
ALBUMIN SERPL BCP-MCNC: 2.2 G/DL
ALBUMIN SERPL BCP-MCNC: 2.5 G/DL
ALP SERPL-CCNC: 111 U/L
ALT SERPL W/O P-5'-P-CCNC: 67 U/L
ANION GAP SERPL CALC-SCNC: 5 MMOL/L
ANION GAP SERPL CALC-SCNC: 6 MMOL/L
ANISOCYTOSIS BLD QL SMEAR: SLIGHT
APTT BLDCRRT: 28.8 SEC
AST SERPL-CCNC: 41 U/L
BASOPHILS # BLD AUTO: 0 K/UL
BASOPHILS NFR BLD: 0 %
BILIRUB DIRECT SERPL-MCNC: 0.5 MG/DL
BILIRUB SERPL-MCNC: 0.8 MG/DL
BLD GP AB SCN CELLS X3 SERPL QL: NORMAL
BLD PROD TYP BPU: NORMAL
BLOOD UNIT EXPIRATION DATE: NORMAL
BLOOD UNIT TYPE CODE: 6200
BLOOD UNIT TYPE: NORMAL
BUN SERPL-MCNC: 12 MG/DL
BUN SERPL-MCNC: 14 MG/DL
BUN SERPL-MCNC: 8 MG/DL
BUN SERPL-MCNC: 9 MG/DL
CALCIUM SERPL-MCNC: 7 MG/DL
CALCIUM SERPL-MCNC: 7.1 MG/DL
CALCIUM SERPL-MCNC: 7.5 MG/DL
CHLORIDE SERPL-SCNC: 110 MMOL/L
CHLORIDE SERPL-SCNC: 112 MMOL/L
CO2 SERPL-SCNC: 20 MMOL/L
CO2 SERPL-SCNC: 21 MMOL/L
CO2 SERPL-SCNC: 21 MMOL/L
CO2 SERPL-SCNC: 24 MMOL/L
CODING SYSTEM: NORMAL
CREAT SERPL-MCNC: 0.7 MG/DL
CREAT SERPL-MCNC: 0.8 MG/DL
CREAT SERPL-MCNC: 0.8 MG/DL
CREAT SERPL-MCNC: 1 MG/DL
DIFFERENTIAL METHOD: ABNORMAL
DISPENSE STATUS: NORMAL
EOSINOPHIL # BLD AUTO: 0 K/UL
EOSINOPHIL NFR BLD: 0 %
ERYTHROCYTE [DISTWIDTH] IN BLOOD BY AUTOMATED COUNT: 16.9 %
EST. GFR  (AFRICAN AMERICAN): >60 ML/MIN/1.73 M^2
EST. GFR  (NON AFRICAN AMERICAN): >60 ML/MIN/1.73 M^2
GGT SERPL-CCNC: 149 U/L
GLUCOSE SERPL-MCNC: 199 MG/DL
GLUCOSE SERPL-MCNC: 221 MG/DL
GLUCOSE SERPL-MCNC: 273 MG/DL
GLUCOSE SERPL-MCNC: 287 MG/DL
HCT VFR BLD AUTO: 21.5 %
HGB BLD-MCNC: 7 G/DL
HYPOCHROMIA BLD QL SMEAR: ABNORMAL
IMM GRANULOCYTES # BLD AUTO: 0.01 K/UL
IMM GRANULOCYTES NFR BLD AUTO: 0.3 %
INR PPP: 1.6
LYMPHOCYTES # BLD AUTO: 0.3 K/UL
LYMPHOCYTES NFR BLD: 9.9 %
MAGNESIUM SERPL-MCNC: 1.8 MG/DL
MAGNESIUM SERPL-MCNC: 2.1 MG/DL
MCH RBC QN AUTO: 32.6 PG
MCHC RBC AUTO-ENTMCNC: 32.6 G/DL
MCV RBC AUTO: 100 FL
MONOCYTES # BLD AUTO: 0.3 K/UL
MONOCYTES NFR BLD: 10.5 %
NEUTROPHILS # BLD AUTO: 2.5 K/UL
NEUTROPHILS NFR BLD: 79.3 %
NRBC BLD-RTO: 1 /100 WBC
OVALOCYTES BLD QL SMEAR: ABNORMAL
PHOSPHATE SERPL-MCNC: 2.2 MG/DL
PHOSPHATE SERPL-MCNC: 3.4 MG/DL
PHOSPHATE SERPL-MCNC: 3.4 MG/DL
PLATELET # BLD AUTO: 33 K/UL
PMV BLD AUTO: 12.3 FL
POCT GLUCOSE: 207 MG/DL (ref 70–110)
POCT GLUCOSE: 225 MG/DL (ref 70–110)
POCT GLUCOSE: 235 MG/DL (ref 70–110)
POCT GLUCOSE: 272 MG/DL (ref 70–110)
POIKILOCYTOSIS BLD QL SMEAR: SLIGHT
POLYCHROMASIA BLD QL SMEAR: ABNORMAL
POTASSIUM SERPL-SCNC: 4.2 MMOL/L
POTASSIUM SERPL-SCNC: 4.3 MMOL/L
PROT SERPL-MCNC: 3.8 G/DL
PROTHROMBIN TIME: 15.8 SEC
RBC # BLD AUTO: 2.15 M/UL
SODIUM SERPL-SCNC: 137 MMOL/L
SODIUM SERPL-SCNC: 138 MMOL/L
SODIUM SERPL-SCNC: 139 MMOL/L
SPHEROCYTES BLD QL SMEAR: ABNORMAL
TACROLIMUS BLD-MCNC: 3.5 NG/ML
TRANS ERYTHROCYTES VOL PATIENT: NORMAL ML
WBC # BLD AUTO: 3.14 K/UL

## 2018-07-28 PROCEDURE — 63600175 PHARM REV CODE 636 W HCPCS: Performed by: INTERNAL MEDICINE

## 2018-07-28 PROCEDURE — 80197 ASSAY OF TACROLIMUS: CPT

## 2018-07-28 PROCEDURE — P9047 ALBUMIN (HUMAN), 25%, 50ML: HCPCS | Mod: JG | Performed by: SURGERY

## 2018-07-28 PROCEDURE — 63600175 PHARM REV CODE 636 W HCPCS: Performed by: PEDIATRICS

## 2018-07-28 PROCEDURE — 25000003 PHARM REV CODE 250: Performed by: STUDENT IN AN ORGANIZED HEALTH CARE EDUCATION/TRAINING PROGRAM

## 2018-07-28 PROCEDURE — 25000003 PHARM REV CODE 250: Performed by: SURGERY

## 2018-07-28 PROCEDURE — 83735 ASSAY OF MAGNESIUM: CPT

## 2018-07-28 PROCEDURE — 94799 UNLISTED PULMONARY SVC/PX: CPT

## 2018-07-28 PROCEDURE — 63600175 PHARM REV CODE 636 W HCPCS: Performed by: STUDENT IN AN ORGANIZED HEALTH CARE EDUCATION/TRAINING PROGRAM

## 2018-07-28 PROCEDURE — 27000221 HC OXYGEN, UP TO 24 HOURS

## 2018-07-28 PROCEDURE — C9113 INJ PANTOPRAZOLE SODIUM, VIA: HCPCS | Performed by: STUDENT IN AN ORGANIZED HEALTH CARE EDUCATION/TRAINING PROGRAM

## 2018-07-28 PROCEDURE — 25000003 PHARM REV CODE 250: Performed by: PHYSICIAN ASSISTANT

## 2018-07-28 PROCEDURE — 25000003 PHARM REV CODE 250: Performed by: INTERNAL MEDICINE

## 2018-07-28 PROCEDURE — 86850 RBC ANTIBODY SCREEN: CPT

## 2018-07-28 PROCEDURE — 25000003 PHARM REV CODE 250: Performed by: PEDIATRICS

## 2018-07-28 PROCEDURE — 85610 PROTHROMBIN TIME: CPT

## 2018-07-28 PROCEDURE — 80053 COMPREHEN METABOLIC PANEL: CPT

## 2018-07-28 PROCEDURE — 20000000 HC ICU ROOM

## 2018-07-28 PROCEDURE — 63600175 PHARM REV CODE 636 W HCPCS: Mod: JG | Performed by: SURGERY

## 2018-07-28 PROCEDURE — 80048 BASIC METABOLIC PNL TOTAL CA: CPT | Mod: 91

## 2018-07-28 PROCEDURE — 36430 TRANSFUSION BLD/BLD COMPNT: CPT

## 2018-07-28 PROCEDURE — P9021 RED BLOOD CELLS UNIT: HCPCS

## 2018-07-28 PROCEDURE — 84100 ASSAY OF PHOSPHORUS: CPT

## 2018-07-28 PROCEDURE — 90945 DIALYSIS ONE EVALUATION: CPT

## 2018-07-28 PROCEDURE — 90945 DIALYSIS ONE EVALUATION: CPT | Mod: GC,,, | Performed by: INTERNAL MEDICINE

## 2018-07-28 PROCEDURE — 80069 RENAL FUNCTION PANEL: CPT

## 2018-07-28 PROCEDURE — 85025 COMPLETE CBC W/AUTO DIFF WBC: CPT

## 2018-07-28 PROCEDURE — 82977 ASSAY OF GGT: CPT

## 2018-07-28 PROCEDURE — 80100008 HC CRRT DAILY MAINTENANCE

## 2018-07-28 PROCEDURE — 86920 COMPATIBILITY TEST SPIN: CPT

## 2018-07-28 PROCEDURE — 83735 ASSAY OF MAGNESIUM: CPT | Mod: 91

## 2018-07-28 PROCEDURE — 25000003 PHARM REV CODE 250: Performed by: TRANSPLANT SURGERY

## 2018-07-28 PROCEDURE — 82248 BILIRUBIN DIRECT: CPT

## 2018-07-28 PROCEDURE — 99233 SBSQ HOSP IP/OBS HIGH 50: CPT | Mod: ,,, | Performed by: ANESTHESIOLOGY

## 2018-07-28 PROCEDURE — 85730 THROMBOPLASTIN TIME PARTIAL: CPT

## 2018-07-28 RX ORDER — HYDROCODONE BITARTRATE AND ACETAMINOPHEN 500; 5 MG/1; MG/1
TABLET ORAL
Status: DISCONTINUED | OUTPATIENT
Start: 2018-07-28 | End: 2018-07-29

## 2018-07-28 RX ORDER — INSULIN ASPART 100 [IU]/ML
4 INJECTION, SOLUTION INTRAVENOUS; SUBCUTANEOUS
Status: DISCONTINUED | OUTPATIENT
Start: 2018-07-28 | End: 2018-07-31

## 2018-07-28 RX ORDER — ALBUMIN HUMAN 250 G/1000ML
25 SOLUTION INTRAVENOUS ONCE
Status: COMPLETED | OUTPATIENT
Start: 2018-07-28 | End: 2018-07-28

## 2018-07-28 RX ORDER — MAGNESIUM SULFATE HEPTAHYDRATE 40 MG/ML
2 INJECTION, SOLUTION INTRAVENOUS
Status: DISCONTINUED | OUTPATIENT
Start: 2018-07-28 | End: 2018-07-29

## 2018-07-28 RX ORDER — THIAMINE HCL 100 MG
100 TABLET ORAL DAILY
Status: COMPLETED | OUTPATIENT
Start: 2018-07-28 | End: 2018-07-30

## 2018-07-28 RX ADMIN — SODIUM PHOSPHATE, MONOBASIC, MONOHYDRATE 30 MMOL: 276; 142 INJECTION, SOLUTION INTRAVENOUS at 07:07

## 2018-07-28 RX ADMIN — HEPARIN SODIUM 5000 UNITS: 5000 INJECTION, SOLUTION INTRAVENOUS; SUBCUTANEOUS at 10:07

## 2018-07-28 RX ADMIN — PHYTONADIONE 10 MG: 10 INJECTION, EMULSION INTRAMUSCULAR; INTRAVENOUS; SUBCUTANEOUS at 09:07

## 2018-07-28 RX ADMIN — Medication 220 MG: at 08:07

## 2018-07-28 RX ADMIN — THIAMINE HCL TAB 100 MG 100 MG: 100 TAB at 08:07

## 2018-07-28 RX ADMIN — ATORVASTATIN CALCIUM 40 MG: 20 TABLET, FILM COATED ORAL at 08:07

## 2018-07-28 RX ADMIN — DOCUSATE SODIUM 100 MG: 100 CAPSULE, LIQUID FILLED ORAL at 08:07

## 2018-07-28 RX ADMIN — LEVETIRACETAM 500 MG: 5 INJECTION INTRAVENOUS at 09:07

## 2018-07-28 RX ADMIN — DEXTROSE 40 MG: 50 INJECTION, SOLUTION INTRAVENOUS at 08:07

## 2018-07-28 RX ADMIN — METHYLPREDNISOLONE SODIUM SUCCINATE 20 MG: 40 INJECTION, POWDER, FOR SOLUTION INTRAMUSCULAR; INTRAVENOUS at 08:07

## 2018-07-28 RX ADMIN — METHYLPREDNISOLONE SODIUM SUCCINATE 20 MG: 40 INJECTION, POWDER, FOR SOLUTION INTRAMUSCULAR; INTRAVENOUS at 10:07

## 2018-07-28 RX ADMIN — TRIAMCINOLONE ACETONIDE: 1 CREAM TOPICAL at 08:07

## 2018-07-28 RX ADMIN — LEVETIRACETAM 500 MG: 5 INJECTION INTRAVENOUS at 10:07

## 2018-07-28 RX ADMIN — FLUCONAZOLE 200 MG: 200 TABLET ORAL at 08:07

## 2018-07-28 RX ADMIN — ACYCLOVIR 400 MG: 200 CAPSULE ORAL at 08:07

## 2018-07-28 RX ADMIN — OXYCODONE HYDROCHLORIDE AND ACETAMINOPHEN 1000 MG: 500 TABLET ORAL at 08:07

## 2018-07-28 RX ADMIN — MAGNESIUM SULFATE IN WATER 2 G: 40 INJECTION, SOLUTION INTRAVENOUS at 07:07

## 2018-07-28 RX ADMIN — INSULIN ASPART 3 UNITS: 100 INJECTION, SOLUTION INTRAVENOUS; SUBCUTANEOUS at 08:07

## 2018-07-28 RX ADMIN — DEXTROSE 40 MG: 50 INJECTION, SOLUTION INTRAVENOUS at 10:07

## 2018-07-28 RX ADMIN — Medication 1000 MG: at 09:07

## 2018-07-28 RX ADMIN — CEFTRIAXONE SODIUM 2 G: 2 INJECTION, POWDER, FOR SOLUTION INTRAMUSCULAR; INTRAVENOUS at 06:07

## 2018-07-28 RX ADMIN — Medication 2 MG: at 06:07

## 2018-07-28 RX ADMIN — Medication 220 MG: at 10:07

## 2018-07-28 RX ADMIN — ONDANSETRON 4 MG: 2 INJECTION INTRAMUSCULAR; INTRAVENOUS at 12:07

## 2018-07-28 RX ADMIN — HEPARIN SODIUM 5000 UNITS: 5000 INJECTION, SOLUTION INTRAVENOUS; SUBCUTANEOUS at 06:07

## 2018-07-28 RX ADMIN — Medication 1 MG: at 08:07

## 2018-07-28 RX ADMIN — Medication 1000 MG: at 10:07

## 2018-07-28 RX ADMIN — ALBUMIN HUMAN 25 G: 0.25 SOLUTION INTRAVENOUS at 08:07

## 2018-07-28 RX ADMIN — SULFAMETHOXAZOLE AND TRIMETHOPRIM 1 TABLET: 400; 80 TABLET ORAL at 08:07

## 2018-07-28 RX ADMIN — HEPARIN SODIUM 5000 UNITS: 5000 INJECTION, SOLUTION INTRAVENOUS; SUBCUTANEOUS at 03:07

## 2018-07-28 RX ADMIN — INSULIN ASPART 4 UNITS: 100 INJECTION, SOLUTION INTRAVENOUS; SUBCUTANEOUS at 02:07

## 2018-07-28 RX ADMIN — TRIAMCINOLONE ACETONIDE: 1 CREAM TOPICAL at 10:07

## 2018-07-28 RX ADMIN — ACYCLOVIR 400 MG: 200 CAPSULE ORAL at 10:07

## 2018-07-28 NOTE — PROGRESS NOTES
[facility]  Cardiology  Progress Note    Patient Name: Alon Adamson  MRN: [id number]  Admission Date: 6/22/2018  Hospital Length of Stay: 36 days  Code Status: Full Code   Attending Physician: Williams Moon Jr., MD   Primary Care Physician: Mckinley Vides MD  Expected Discharge Date: 8/3/2018  Principal Problem:Sinus bradycardia    Subjective:     Hospital Course:   No notes on file    Interval History: Patient states he has felt well overnight depsite HR dropping into 30s. BP has maintained in the 120s-130s. Overall asymptomatic with telemetry showing regular bradycardia with non-discernible p-waves.    Review of Systems   Constitution: Negative for chills, fever, weakness and weight gain.   HENT: Negative for congestion.    Eyes: Negative for visual disturbance.   Cardiovascular: Negative for chest pain, claudication, dyspnea on exertion, leg swelling, orthopnea, palpitations and syncope.   Respiratory: Negative for cough, shortness of breath and snoring.    Hematologic/Lymphatic: Does not bruise/bleed easily.   Skin: Negative for rash.   Musculoskeletal: Positive for muscle cramps. Negative for myalgias.   Gastrointestinal: Negative for bloating, abdominal pain, constipation, diarrhea and melena.   Genitourinary: Negative for bladder incontinence.   Neurological: Negative for excessive daytime sleepiness and focal weakness.   Psychiatric/Behavioral: Negative for depression and suicidal ideas.     Objective:     Vital Signs (Most Recent):  Temp: 98.3 °F (36.8 °C) (07/28/18 0700)  Pulse: (!) 51 (07/28/18 1000)  Resp: 16 (07/28/18 1000)  BP: 123/61 (07/27/18 1930)  SpO2: 100 % (07/28/18 1000) Vital Signs (24h Range):  Temp:  [98.3 °F (36.8 °C)-98.5 °F (36.9 °C)] 98.3 °F (36.8 °C)  Pulse:  [32-63] 51  Resp:  [11-44] 16  SpO2:  [94 %-100 %] 100 %  BP: ()/(37-61) 123/61  Arterial Line BP: ()/(29-49) 142/39     Weight: 125.8 kg (277 lb 5.4 oz)  Body mass index is 35.61 kg/m².      SpO2: 100 %  O2 Device (Oxygen Therapy): room air      Intake/Output Summary (Last 24 hours) at 07/28/18 1002  Last data filed at 07/28/18 0800   Gross per 24 hour   Intake             6043 ml   Output             7481 ml   Net            -1438 ml       Lines/Drains/Airways     Central Venous Catheter Line                 Introducer 07/23/18 0500 right internal jugular 5 days         Percutaneous Central Line Insertion/Assessment - double lumen  07/23/18 0500 right internal jugular 5 days         Percutaneous Central Line Insertion/Assessment - triple lumen  07/23/18 0530 right internal jugular 5 days          Drain                 Urethral Catheter 07/21/18 1706 6 days         Rectal Tube 07/27/18 0930 rectal tube w/ balloon (indicate number of mLs) 1 day          Arterial Line                 Arterial Line 07/26/18 1500 Right Radial 1 day          Peripheral Intravenous Line                 Midline Catheter Insertion/Assessment  - Single Lumen 07/20/18 1128 Right cephalic vein (lateral side of arm) 18g x 10cm 7 days                Physical Exam   Constitutional: He is oriented to person, place, and time. He appears well-developed and well-nourished. No distress.   obese   HENT:   Head: Normocephalic and atraumatic.   Mouth/Throat: Oropharynx is clear and moist.   Eyes: Conjunctivae and EOM are normal. Pupils are equal, round, and reactive to light. No scleral icterus.   Neck: Normal range of motion. Neck supple. No JVD present.   Cardiovascular: Regular rhythm and intact distal pulses.  Bradycardia present.  Exam reveals distant heart sounds.    No murmur heard.  Pulses:       Radial pulses are 2+ on the right side, and 2+ on the left side.        Dorsalis pedis pulses are 1+ on the right side, and 1+ on the left side.        Posterior tibial pulses are 1+ on the right side, and 1+ on the left side.   Pulmonary/Chest: Effort normal and breath sounds normal. No respiratory distress.   Symmetrical expansion    Abdominal: Soft. Bowel sounds are normal. There is no hepatosplenomegaly. There is no tenderness.   Musculoskeletal: Normal range of motion. He exhibits edema.   Neurological: He is alert and oriented to person, place, and time. No cranial nerve deficit.   Skin: Skin is warm and dry. No rash noted. He is not diaphoretic. There is erythema.   Consistent with venous stasis dermatitis   Psychiatric: He has a normal mood and affect. Judgment and thought content normal.       Significant Labs:   Blood Culture:   Recent Labs  Lab 07/27/18  1724 07/27/18  1725   LABBLOO No Growth to date No Growth to date   , BMP:   Recent Labs  Lab 07/27/18  1030 07/27/18  1924 07/27/18  2144 07/28/18  0400   *  166* 211* 226* 221*  221*  221*     136 135* 136 139  139  139   K 4.2  4.2 4.2 4.2 4.3  4.3  4.3     104 106 105 110  110  110   CO2 27  27 25 26 24  24  24   BUN 10  10 10 9 9  9  9   CREATININE 0.8  0.8 0.8 0.7 0.7  0.7  0.7   CALCIUM 6.8*  6.8* 7.1* 6.8* 7.0*  7.0*  7.0*   MG 1.9  --  1.6 2.1   , CMP   Recent Labs  Lab 07/26/18  1345  07/27/18  0304 07/27/18  1030 07/27/18  1924 07/27/18  2144 07/28/18  0400     < > 137  137  137 136  136 135* 136 139  139  139   K 4.4  < > 4.3  4.3  4.3 4.2  4.2 4.2 4.2 4.3  4.3  4.3     < > 103  103  103 104  104 106 105 110  110  110   CO2 29  < > 28  28  28 27  27 25 26 24  24  24   *  < > 146*  146*  146* 166*  166* 211* 226* 221*  221*  221*   BUN 19  < > 12  12  12 10  10 10 9 9  9  9   CREATININE 1.4  < > 0.9  0.9  0.9 0.8  0.8 0.8 0.7 0.7  0.7  0.7   CALCIUM 7.3*  < > 7.0*  7.0*  7.0* 6.8*  6.8* 7.1* 6.8* 7.0*  7.0*  7.0*   PROT 4.0*  --  3.8*  --   --   --  3.8*   ALBUMIN 2.4*  < > 2.3*  2.3* 2.3*  --  2.2* 2.2*  2.2*   BILITOT 1.4*  --  1.1*  --   --   --  0.8   ALKPHOS 115  --  112  --   --   --  111   AST 50*  --  45*  --   --   --  41*   ALT 69*  --  63*  --   --   --  67*    ANIONGAP 6*  < > 6*  6*  6* 5*  5* 4* 5* 5*  5*  5*   ESTGFRAFRICA >60.0  < > >60.0  >60.0  >60.0 >60.0  >60.0 >60.0 >60.0 >60.0  >60.0  >60.0   EGFRNONAA 53.5*  < > >60.0  >60.0  >60.0 >60.0  >60.0 >60.0 >60.0 >60.0  >60.0  >60.0   < > = values in this interval not displayed., CBC   Recent Labs  Lab 07/26/18  2210 07/27/18  0304 07/28/18  0400   WBC 4.18 3.91 3.14*   HGB 7.4* 7.4* 7.0*   HCT 22.2* 22.2* 21.5*   PLT 47* 44* 33*   , Lipid Panel No results for input(s): CHOL, HDL, LDLCALC, TRIG, CHOLHDL in the last 48 hours. and Troponin No results for input(s): TROPONINI in the last 48 hours.    Significant Imaging:   EKG: sinus bradycardia with diminished p-wave amplitude in Iimb leads (except V2), no ST-T changes    Assessment and Plan:     * Sinus bradycardia    A 63 y.o. male with hx significant for ESLD (s/p OLT on 7/23), CAD, HTN and CKD3, in the SICU now post-op day 3 following liver transplant, with concern for new sinus bradycardia with poorly visualized p-waves in limb leads and on tele.    The cause is unclear, but patient is tolerating this well and blood pressure has been stable.     -Avoid AV shan blocking agents  -Keep atropine at bedside  -Use atropine and/or dopamine only if pt symptomatically bradycardic or hypotensive/hemodynamically unstable  - if hypotension does occur, please consult EP for device evaluation.           Bilateral edema of lower extremity    Non-cardiogenic, management as per primary team.        Coronary artery disease involving native coronary artery of native heart without angina pectoris    Pt is a 63 year old gentleman who we are consulted for chest pain. He has ESLD and is currently admitted due to volume overload and increasing MELD. Also has a hx of hypertension, diabetes, CAD with PCI to dLAD and oPDA in 3/2017 as a result of a positive PET stress for Liver transplant eval. Last evening he complained of dull 4/10 substernal chest pain that started while  laying in bed and was relieved on its own. Unsure how long it lasted but guessed about 30 minutes. He has never had chest pain before. EKG was done during episode and NSR no ST/T wave changes. Troponin was measured and flat at 0.14.     This is not ACS and Chest pain seems non cardiac in nature. Patient reports that is similar to his reflux pain. Also he  just had a negative stress test in January of this year. Elevated troponin is flat and can be explained by his CKD. No need for further work up at this time. Would start on asa 81 mg daily if ok with primary team. If ok with primary team would also start on BB for portal hypertension. Ideally would placed patient on a statin but with decompensated cirrhosis now is not the appopriate time.               VTE Risk Mitigation         Ordered     heparin (porcine) injection 5,000 Units  Every 8 hours      07/26/18 1330     Place sequential compression device  Until discontinued      07/23/18 1326     IP VTE HIGH RISK PATIENT  Once      07/23/18 1242     Send SCD stockings and ESE hose with patient to OR  Continuous      07/22/18 9738          Joselo Jimenez MD  Cardiology  [facility]

## 2018-07-28 NOTE — ASSESSMENT & PLAN NOTE
63 yr old male with decompensated alcoholic cirrhosis, CKD III and CAD. Hospitalized with HE, VICKY on CKD. S/p OLT on 7/23 with intraop CRRT. Brought to the ICU intubated, not requiring pressor support. Significant blood loss, with aggressive resuscitation intraop    Neuro  -hx seizures and HE  -keppra  -AAOX4    Cardiovascular  - cards consulted for junctional bradycardia; determined it was sinus bradycardia 2/2 electrolyte derangement and recommended keeping atropine at bedside in case it becomes symptomatic  - h/o CAD  - 2D Echo 7/9 normal EF (55-60%), trivial tricuspid/mitral regurg  - vasopressin weaned off 7/25, restarted 7/27 for hypotension during CRRT, radial A-line placed.  - bradycardia since 7/27 after starting vaso, possible baroreceptor reflex    Respiratory:  - extubated 7/25  - no acute issues    Renal:  -VICKY on CKD-intraop CRRT, anuric  -Nephrology following, on CRRT  -[place], BUN/Cr    Liver and Pancreas  -s/p OLT 7/23   -Trend liver enzymes, enzymes and Tbili improving, but PT/INR worse than yesterday  -PLTs low.  Transfuse per Transplant Surgery    GI  -Clear liquids  -Replete lytes prn       ID  -Anti-rejection, antiretrovirals as per primary  -monitor WBC    Heme  -h/h stable, thrombocytopenia. 14 L blood loss intraop.   RBC (Units) 9   FFP (Units) 2   Cryoprecipitate (Units)  2   Platelets (Units) 2   -PLT transfusion per Transplant Surgery  -some heme output in OG tube, continue to monitor CBC and output  -Hep 5k DVT ppx    Dispo  -Primary team Transplant Surgery  -Monitor platelets, any bleeding, and other LFTs  -Wean vasopressin and CRRT  -Continue SICU care

## 2018-07-28 NOTE — PT/OT/SLP RE-EVAL
Physical Therapy Re-evaluation    Patient Name:  Alon Adamson   MRN:  [id number]    Co Re-Eval with OT    Recommendations:     Discharge Recommendations:  nursing facility, skilled   Discharge Equipment Recommendations:  (TBD)   Barriers to discharge: Inaccessible home (5 VANESSA and R HR) and Decreased caregiver support at current functional level     Assessment:     Alon Adamson is a 63 y.o. male admitted with a medical diagnosis of Liver transplanted.  He presents with the following impairments/functional limitations:  weakness, gait instability, impaired endurance, impaired self care skills, impaired functional mobilty, impaired balance, decreased coordination, decreased lower extremity function, decreased upper extremity function, pain, impaired cardiopulmonary response to activity.    Rehab Prognosis:  good; patient would benefit from acute skilled PT services to address these deficits and reach maximum level of function.      Recent Surgery: Procedure(s) (LRB):  TRANSPLANT, LIVER (N/A) 4 Days Post-Op    Plan:     During this hospitalization, patient to be seen 4 x/week to address the above listed problems via therapeutic activities, therapeutic exercises, gait training, neuromuscular re-education  · Plan of Care Expires:  08/26/18   Plan of Care Reviewed with: patient, spouse, son    Subjective     Communicated with RN prior to session and family present in room.  Patient found in bed upon PT entry to room, agreeable to evaluation.      Chief Complaint: pain  Patient comments/goals: to get better and return home   Pain/Comfort:  · Pain Rating 1: 0/10 (B LE's)  · Pain Addressed 1: Reposition, Distraction  · Pain Rating Post-Intervention 1: 8/10    Patients cultural, spiritual, Yazidism conflicts given the current situation: none reported       Objective:     Patient found with: telemetry, pulse ox (continuous), blood pressure cuff, oxygen, central line, peripheral IV, griffin catheter, arterial line,  CRRT (IJ access)     General Precautions: Standard, fall   Orthopedic Precautions:N/A   Braces: N/A     Exams:  · Cognitive Exam:    · AAOx4  · Follows multistep commands  · Communication clear/fluent   · RLE ROM: WFL  · RLE Strength: grossly 3/5  · LLE ROM: WFL  · LLE Strength: grossly 3/5    Functional Mobility:  · Bed Mobility:     · Rolling Left:  total assistance and of 2 persons  · Rolling Right: total assistance and of 2 persons  · Scooting: total assistance and of 2 persons  · Supine to Sit: total assistance and of 2 persons  · Sit to Supine: total assistance and of 2 persons  · Transfers: not performed   · Gait: not performed     AM-PAC 6 CLICK MOBILITY  Total Score:8       Therapeutic Activities and Exercises:  Educated pt on PT role/POC  Educated pt on importance of OOB activity  Pt verbalized understanding     Sitting EOB x 15 minutes with total A to increase tolerance to OOB activity and to create optimal positioning for lung expansion   B LE AROM in all available planes x 10 reps while sitting EOB    B UE therex and grooming with OT      Patient left HOB elevated with all lines intact, call button in reach, RN notified and family present.    GOALS:    Physical Therapy Goals        Problem: Physical Therapy Goal    Goal Priority Disciplines Outcome Goal Variances Interventions   Physical Therapy Goal     PT/OT, PT Ongoing (interventions implemented as appropriate)     Description:  Goals to be met by: 2018    Patient will increase functional independence with mobility by performin. Supine to sit with moderate assistance - not met  2. Sit to stand with moderate assistance - not met  3. Gait x 50ft with moderate assistance with RW. - not met  4. Ascend/descend 5 stair with right Handrails with moderate assistance. - not met  5. Lower extremity exercise program x 20 reps, with supervision, in order to increase LE strength and (I) with functional mobility.                                History:      Past Medical History:   Diagnosis Date    Anticoagulant long-term use     Arthritis     Back pain     Cellulitis     Cirrhosis     Coronary artery disease     DM (diabetes mellitus)     Hearing loss     right ear    Hepatic encephalopathy     Hypertension     diagnosed today (11/25/2015) and prescribed toprol    Leg edema     Nephrolithiasis     JACK (obstructive sleep apnea)     Seizures     Splenomegaly        Past Surgical History:   Procedure Laterality Date    APPENDECTOMY      Open    BACK SURGERY      CHOLECYSTECTOMY      laprascopic    COLONOSCOPY N/A 1/25/2018    Procedure: COLONOSCOPY;  Surgeon: Lashawn Myles MD;  Location: [facility] ENDO (2ND FLR);  Service: Endoscopy;  Laterality: N/A;    LIVER TRANSPLANT N/A 7/23/2018    Procedure: TRANSPLANT, LIVER;  Surgeon: Alma Joyce MD;  Location: [facility] OR [facility] FLR;  Service: Transplant;  Laterality: N/A;    LUMBAR DISCECTOMY      SPLENIC ARTERY EMBOLIZATION  04/08/2016    Dr Taveras    TONSILLECTOMY         Time Tracking:     PT Received On: 07/27/18  PT Start Time: 1025     PT Stop Time: 1049  PT Total Time (min): 24 min     Billable Minutes: Re-eval 10      Shonna Maradiaga, PT, DPT  7/27/2018  [phone]

## 2018-07-28 NOTE — PLAN OF CARE
Problem: Physical Therapy Goal  Goal: Physical Therapy Goal  Goals to be met by: 2018    Patient will increase functional independence with mobility by performin. Supine to sit with moderate assistance - not met  2. Sit to stand with moderate assistance - not met  3. Gait x 50ft with moderate assistance with RW. - not met  4. Ascend/descend 5 stair with right Handrails with moderate assistance. - not met  5. Lower extremity exercise program x 20 reps, with supervision, in order to increase LE strength and (I) with functional mobility.              Outcome: Ongoing (interventions implemented as appropriate)  Re-eval completed. Date and goals updated. Goals appropriate.

## 2018-07-28 NOTE — ASSESSMENT & PLAN NOTE
63 year old male with ESLD 2/2 EtOH and ESRD s/p liver transplant 7/23/18    Neuro:   - Sedation: None  - Pain control: PRN PO Oxycodone  - Continue Keppra     Pulmonary:   - 2LNC; wean as tolerated  - CXR with small right sided pleural effusion. Monitor  - IS/OOBTC     Cardiac:  - Drips: None, Vasopressin weaned off  - New asymptomatic bradycardia. Junctional bradycardia on EKG  - Atropine at bedside, pacer pads in place  - Cardiology agrees with atropine for any hemodynamic instability, otherwise no intervention required     Renal:   - BUN/CR: 8/0.8  - CRRT per Nephrology, will discuss with team about transitioning to HD   - Trend BMP     Infectious Disease:   - Afebrile, WBC normal   - Immunosuppression with [place], Cellcept, MPN  - Opp infection ppx with Bactrim, Valcyte, Nystatin  - Rocephin x7 days for E. Coli+ Bcx in donor  - Trend WBC     Hematology/Oncology:  - H/H stable  - INR 1.6  - Trend coags  - Trend CBC     Endocrine:  - Insulin gtt     Fluids/Electrolytes/Nutrition/GI:   - Soft puree diet  - Trend CMP  - Replace Lytes PRN      Dispo:  Continue SICU care.   Primary team: Transplant Surgery

## 2018-07-28 NOTE — SUBJECTIVE & OBJECTIVE
Interval History/Significant Events: PRASANTH KAURS.  Very good mentation today.  Vaso on 0.04 non-titrating due to hypotension during CRRT.  Asymptomatic bradycardia after starting vaso.    Follow-up For: Procedure(s) (LRB):  TRANSPLANT, LIVER (N/A)    Post-Operative Day: 5 Days Post-Op    Objective:     Vital Signs (Most Recent):  Temp: 98.5 °F (36.9 °C) (07/28/18 0300)  Pulse: (!) 41 (07/28/18 0645)  Resp: 20 (07/28/18 0645)  BP: 123/61 (07/27/18 1930)  SpO2: 100 % (07/28/18 0645) Vital Signs (24h Range):  Temp:  [98.3 °F (36.8 °C)-98.5 °F (36.9 °C)] 98.5 °F (36.9 °C)  Pulse:  [33-59] 41  Resp:  [11-44] 20  SpO2:  [94 %-100 %] 100 %  BP: ()/(37-61) 123/61  Arterial Line BP: ()/(29-49) 141/36     Weight: 125.8 kg (277 lb 5.4 oz)  Body mass index is 35.61 kg/m².      Intake/Output Summary (Last 24 hours) at 07/28/18 0713  Last data filed at 07/28/18 0600   Gross per 24 hour   Intake             6633 ml   Output             7742 ml   Net            -1109 ml       Physical Exam   Constitutional: He is oriented to person, place, and time.   Appears severely ill, bitemporal wasting   HENT:   Head: Normocephalic.   Right Ear: External ear normal.   Left Ear: External ear normal.   Eyes: EOM are normal. Pupils are equal, round, and reactive to light. Right eye exhibits no discharge. Left eye exhibits no discharge.   Cardiovascular: Regular rhythm and normal heart sounds.  Exam reveals no gallop and no friction rub.    No murmur heard.  Sinus bradycardia   Pulmonary/Chest: Effort normal and breath sounds normal. No respiratory distress. He has no wheezes. He has no rales. He exhibits no tenderness.   Abdominal: Soft. He exhibits no distension and no mass. There is tenderness. There is no rebound and no guarding. No hernia.   Chevron scar from OLT c/d/i and dressing over former drain site in RLQ; appropriately tender   Genitourinary:   Genitourinary Comments: Severe scrotal edema   Musculoskeletal: He exhibits  edema.   Neurological: He is alert and oriented to person, place, and time.   Skin: Skin is warm and dry. No erythema.   Nursing note and vitals reviewed.      Vents:  Vent Mode: Spont (07/25/18 0929)  Set Rate: 16 bmp (07/25/18 0700)  Vt Set: 500 mL (07/23/18 1446)  Pressure Support: 10 cmH20 (07/25/18 0929)  PEEP/CPAP: 5 cmH20 (07/25/18 0929)  Oxygen Concentration (%): 28 (07/27/18 0445)  Peak Airway Pressure: 16 cmH2O (07/25/18 0929)  Plateau Pressure: 12 cmH20 (07/25/18 0929)  Total Ve: 14.1 mL (07/25/18 0929)  Negative Inspiratory Force (cm H2O): -21 (07/25/18 1020)  F/VT Ratio<105 (RSBI): (!) 24.86 (07/25/18 0700)    Lines/Drains/Airways     Central Venous Catheter Line                 Introducer 07/23/18 0500 right internal jugular 5 days         Percutaneous Central Line Insertion/Assessment - double lumen  07/23/18 0500 right internal jugular 5 days         Percutaneous Central Line Insertion/Assessment - triple lumen  07/23/18 0530 right internal jugular 5 days          Drain                 Urethral Catheter 07/21/18 1706 6 days         Rectal Tube 07/27/18 0930 rectal tube w/ balloon (indicate number of mLs) less than 1 day          Arterial Line                 Arterial Line 07/26/18 1500 Right Radial 1 day          Peripheral Intravenous Line                 Midline Catheter Insertion/Assessment  - Single Lumen 07/20/18 1128 Right cephalic vein (lateral side of arm) 18g x 10cm 7 days                Significant Labs:    CBC/Anemia Profile:    Recent Labs  Lab 07/26/18  2210 07/27/18  0304 07/28/18  0400   WBC 4.18 3.91 3.14*   HGB 7.4* 7.4* 7.0*   HCT 22.2* 22.2* 21.5*   PLT 47* 44* 33*   MCV 97 98 100*   RDW 17.5* 17.2* 16.9*        Chemistries:    Recent Labs  Lab 07/26/18  1345  07/27/18  0304 07/27/18  1030 07/27/18  1924 07/27/18 2144 07/28/18  0400     < > 137  137  137 136  136 135* 136 139  139  139   K 4.4  < > 4.3  4.3  4.3 4.2  4.2 4.2 4.2 4.3  4.3  4.3     < > 103   103  103 104  104 106 105 110  110  110   CO2 29  < > 28  28  28 27  27 25 26 24  24  24   BUN 19  < > 12  12  12 10  10 10 9 9  9  9   CREATININE 1.4  < > 0.9  0.9  0.9 0.8  0.8 0.8 0.7 0.7  0.7  0.7   CALCIUM 7.3*  < > 7.0*  7.0*  7.0* 6.8*  6.8* 7.1* 6.8* 7.0*  7.0*  7.0*   ALBUMIN 2.4*  < > 2.3*  2.3* 2.3*  --  2.2* 2.2*  2.2*   PROT 4.0*  --  3.8*  --   --   --  3.8*   BILITOT 1.4*  --  1.1*  --   --   --  0.8   ALKPHOS 115  --  112  --   --   --  111   ALT 69*  --  63*  --   --   --  67*   AST 50*  --  45*  --   --   --  41*   MG  --   < > 2.0 1.9  --  1.6 2.1   PHOS 4.1  < > 3.2  3.2 2.9  --  2.1* 3.4  3.4   < > = values in this interval not displayed.    All pertinent labs within the past 24 hours have been reviewed.    Significant Imaging:  I have reviewed all pertinent imaging results/findings within the past 24 hours.

## 2018-07-28 NOTE — SUBJECTIVE & OBJECTIVE
Interval History:  No acute events overnight. Remains bradycardic 30-50 bpm but asymptomatic and BP stable. Tolerating CRRT. Much more alert and interactive this morning    Scheduled Meds:   acyclovir  400 mg Oral BID    ascorbic acid (vitamin C)  1,000 mg Oral Daily    atorvastatin  40 mg Oral Daily    cefTRIAXone (ROCEPHIN) IVPB  2 g Intravenous Q24H    docusate sodium  100 mg Oral Daily    ergocalciferol  50,000 Units Oral Q7 Days    fluconazole  200 mg Oral Daily    heparin (porcine)  5,000 Units Subcutaneous Q8H    insulin aspart U-100  4 Units Subcutaneous TIDWM    levetiracetam IVPB  500 mg Intravenous Q12H    methylPREDNISolone sodium succinate  20 mg Intravenous Q12H    Followed by    [START ON 7/29/2018] predniSONE  20 mg Oral Daily    mycophenolate mofetil  1,000 mg Oral BID    pantoprazole 40 mg in dextrose 5 % 100 mL IVPB (over 15 minutes) (ready to mix system)  40 mg Intravenous BID    sulfamethoxazole-trimethoprim 400-80mg  1 tablet Oral Daily    tacrolimus  2 mg Oral BID    thiamine  100 mg Oral Daily    triamcinolone acetonide 0.1%   Topical (Top) BID    zinc sulfate  220 mg Oral BID     Continuous Infusions:   sodium chloride 0.9% 200 mL/hr at 07/27/18 1800    vasopressin (PITRESSIN) infusion Stopped (07/28/18 1000)     PRN Meds:sodium chloride, dextrose 50%, dextrose 50%, diphenhydrAMINE-zinc acetate 1-0.1%, gelatin adsorbable 100cm top sponge, glucagon (human recombinant), glucose, glucose, insulin aspart U-100, magnesium sulfate IVPB, metoclopramide HCl, ondansetron, oxyCODONE, oxyCODONE, sodium chloride 0.9%, sodium phosphate IVPB, sodium phosphate IVPB, sodium phosphate IVPB    Review of Systems   Constitutional: Negative.    HENT: Negative.    Respiratory: Negative.    Cardiovascular: Negative.    Gastrointestinal: Positive for abdominal pain.   Genitourinary: Negative.    Musculoskeletal: Positive for back pain and joint swelling.   Skin: Positive for wound.    Neurological: Negative.    Psychiatric/Behavioral: Negative for confusion.     Objective:     Vital Signs (Most Recent):  Temp: 98 °F (36.7 °C) (07/28/18 1145)  Pulse: (!) 54 (07/28/18 1415)  Resp: (!) 24 (07/28/18 1415)  BP: (!) 116/54 (07/28/18 1145)  SpO2: 97 % (07/28/18 1415) Vital Signs (24h Range):  Temp:  [98 °F (36.7 °C)-98.5 °F (36.9 °C)] 98 °F (36.7 °C)  Pulse:  [32-63] 54  Resp:  [13-44] 24  SpO2:  [94 %-100 %] 97 %  BP: ()/(37-61) 116/54  Arterial Line BP: ()/(29-49) 146/39     Weight: 125.8 kg (277 lb 5.4 oz)  Body mass index is 35.61 kg/m².    Intake/Output - Last 3 Shifts       07/26 0700 - 07/27 0659 07/27 0700 - 07/28 0659 07/28 0700 - 07/29 0659    P.O. 240 950     I.V. (mL/kg) 7355 (56.6) 5503 (43.7) 1798 (14.3)    Blood 228  242    NG/GT  180     Total Intake(mL/kg) 7823 (60.2) 6633 (52.7) 2040 (16.2)    Urine (mL/kg/hr) 65 (0) 10 (0) 20 (0)    Drains 50 (0)      Other 6375 (2) 6732 (2.2) 2912 (3.1)    Stool  1000 (0.3)     Total Output 6490 7742 2932    Net +1333 -1109 -892           Stool Occurrence 3 x 1 x           Physical Exam   Constitutional: He is oriented to person, place, and time.   Appears severely ill, bitemporal wasting   HENT:   Head: Normocephalic.   Right Ear: External ear normal.   Left Ear: External ear normal.   Eyes: EOM are normal. Pupils are equal, round, and reactive to light. Right eye exhibits no discharge. Left eye exhibits no discharge.   Cardiovascular: Regular rhythm and normal heart sounds.  Exam reveals no gallop and no friction rub.    No murmur heard.  Sinus bradycardia   Pulmonary/Chest: Effort normal and breath sounds normal. No respiratory distress. He has no wheezes. He has no rales. He exhibits no tenderness.   Abdominal: Soft. He exhibits no distension and no mass. There is tenderness. There is no rebound and no guarding. No hernia.   Chevron scar from OLT c/d/i and dressing over former drain site in RLQ; appropriately tender   Genitourinary:    Genitourinary Comments: Severe scrotal edema   Musculoskeletal: He exhibits edema.   Neurological: He is alert and oriented to person, place, and time.   Skin: Skin is warm and dry. No erythema.   Nursing note and vitals reviewed.    Laboratory:  Immunosuppressants         Stop Route Frequency     tacrolimus (PROGRAF) 1 mg/mL oral syringe      -- Oral 2 times daily     mycophenolate mofetil 200 mg/mL suspension 1,000 mg      -- Oral 2 times daily        CBC:   Recent Labs  Lab 07/28/18  0400   WBC 3.14*   RBC 2.15*   HGB 7.0*   HCT 21.5*   PLT 33*   *   MCH 32.6*   MCHC 32.6     CMP:   Recent Labs  Lab 07/28/18  0400 07/28/18  1200   *  221*  221* 199*   CALCIUM 7.0*  7.0*  7.0* 7.5*   ALBUMIN 2.2*  2.2*  --    PROT 3.8*  --      139  139 138   K 4.3  4.3  4.3 4.3   CO2 24  24  24 21*     110  110 112*   BUN 9  9  9 8   CREATININE 0.7  0.7  0.7 0.8   ALKPHOS 111  --    ALT 67*  --    AST 41*  --    BILITOT 0.8  --      Coagulation:   Recent Labs  Lab 07/28/18  0400   INR 1.6*   APTT 28.8     Labs within the past 24 hours have been reviewed.    Diagnostic Results:  I have personally reviewed all pertinent imaging studies.

## 2018-07-28 NOTE — ASSESSMENT & PLAN NOTE
A 63 y.o. male with hx significant for ESLD (s/p OLT on 7/23), CAD, HTN and CKD3, in the SICU now post-op day 3 following liver transplant, with concern for new sinus bradycardia with poorly visualized p-waves in limb leads and on tele.    The cause is unclear, but patient is tolerating this well and blood pressure has been stable.     -Avoid AV shan blocking agents  -Keep atropine at bedside  -Use atropine and/or dopamine only if pt symptomatically bradycardic or hypotensive/hemodynamically unstable  - if hypotension does occur, please consult EP for device evaluation.

## 2018-07-28 NOTE — PROGRESS NOTES
[facility]  Liver Transplant  Progress Note    Patient Name: Alon Adamson  MRN: [id number]  Admission Date: 2018  Hospital Length of Stay: 36 days  Code Status: Full Code  Primary Care Provider: Mckinley Vides MD  Post-Op Day 5 Days Post-Op    Admit Diagnosis: ESLD/ESRD  Procedures: OLTx    ORGAN:   LIVER  Disease Etiology: Alcoholic Cirrhosis  Donor Type:    - Brain Death  CDC High Risk:   No  Donor CMV Status:   Donor CMV Status: Negative  Donor HBcAB:   Negative  Donor HCV Status:   Negative  Donor HBV TAMAR: Negative  Donor HCV TAMAR: Negative  Whole or Partial: Whole Liver  Biliary Anastomosis: End to End  Arterial Anatomy: Standard    Subjective:     Interval History:  No acute events overnight. Remains bradycardic 30-50 bpm but asymptomatic and BP stable. Tolerating CRRT. Much more alert and interactive this morning    Scheduled Meds:   acyclovir  400 mg Oral BID    ascorbic acid (vitamin C)  1,000 mg Oral Daily    atorvastatin  40 mg Oral Daily    cefTRIAXone (ROCEPHIN) IVPB  2 g Intravenous Q24H    docusate sodium  100 mg Oral Daily    ergocalciferol  50,000 Units Oral Q7 Days    fluconazole  200 mg Oral Daily    heparin (porcine)  5,000 Units Subcutaneous Q8H    insulin aspart U-100  4 Units Subcutaneous TIDWM    levetiracetam IVPB  500 mg Intravenous Q12H    methylPREDNISolone sodium succinate  20 mg Intravenous Q12H    Followed by    [START ON 2018] predniSONE  20 mg Oral Daily    mycophenolate mofetil  1,000 mg Oral BID    pantoprazole 40 mg in dextrose 5 % 100 mL IVPB (over 15 minutes) (ready to mix system)  40 mg Intravenous BID    sulfamethoxazole-trimethoprim 400-80mg  1 tablet Oral Daily    tacrolimus  2 mg Oral BID    thiamine  100 mg Oral Daily    triamcinolone acetonide 0.1%   Topical (Top) BID    zinc sulfate  220 mg Oral BID     Continuous Infusions:   sodium chloride 0.9% 200 mL/hr at 18 1800    vasopressin (PITRESSIN) infusion  Stopped (07/28/18 1000)     PRN Meds:sodium chloride, dextrose 50%, dextrose 50%, diphenhydrAMINE-zinc acetate 1-0.1%, gelatin adsorbable 100cm top sponge, glucagon (human recombinant), glucose, glucose, insulin aspart U-100, magnesium sulfate IVPB, metoclopramide HCl, ondansetron, oxyCODONE, oxyCODONE, sodium chloride 0.9%, sodium phosphate IVPB, sodium phosphate IVPB, sodium phosphate IVPB    Review of Systems   Constitutional: Negative.    HENT: Negative.    Respiratory: Negative.    Cardiovascular: Negative.    Gastrointestinal: Positive for abdominal pain.   Genitourinary: Negative.    Musculoskeletal: Positive for back pain and joint swelling.   Skin: Positive for wound.   Neurological: Negative.    Psychiatric/Behavioral: Negative for confusion.     Objective:     Vital Signs (Most Recent):  Temp: 98 °F (36.7 °C) (07/28/18 1145)  Pulse: (!) 54 (07/28/18 1415)  Resp: (!) 24 (07/28/18 1415)  BP: (!) 116/54 (07/28/18 1145)  SpO2: 97 % (07/28/18 1415) Vital Signs (24h Range):  Temp:  [98 °F (36.7 °C)-98.5 °F (36.9 °C)] 98 °F (36.7 °C)  Pulse:  [32-63] 54  Resp:  [13-44] 24  SpO2:  [94 %-100 %] 97 %  BP: ()/(37-61) 116/54  Arterial Line BP: ()/(29-49) 146/39     Weight: 125.8 kg (277 lb 5.4 oz)  Body mass index is 35.61 kg/m².    Intake/Output - Last 3 Shifts       07/26 0700 - 07/27 0659 07/27 0700 - 07/28 0659 07/28 0700 - 07/29 0659    P.O. 240 950     I.V. (mL/kg) 7355 (56.6) 5503 (43.7) 1798 (14.3)    Blood 228  242    NG/GT  180     Total Intake(mL/kg) 7823 (60.2) 6633 (52.7) 2040 (16.2)    Urine (mL/kg/hr) 65 (0) 10 (0) 20 (0)    Drains 50 (0)      Other 6375 (2) 6732 (2.2) 2912 (3.1)    Stool  1000 (0.3)     Total Output 6490 7742 2932    Net +1333 -1109 -892           Stool Occurrence 3 x 1 x           Physical Exam   Constitutional: He is oriented to person, place, and time.   Appears severely ill, bitemporal wasting   HENT:   Head: Normocephalic.   Right Ear: External ear normal.   Left Ear:  External ear normal.   Eyes: EOM are normal. Pupils are equal, round, and reactive to light. Right eye exhibits no discharge. Left eye exhibits no discharge.   Cardiovascular: Regular rhythm and normal heart sounds.  Exam reveals no gallop and no friction rub.    No murmur heard.  Sinus bradycardia   Pulmonary/Chest: Effort normal and breath sounds normal. No respiratory distress. He has no wheezes. He has no rales. He exhibits no tenderness.   Abdominal: Soft. He exhibits no distension and no mass. There is tenderness. There is no rebound and no guarding. No hernia.   Chevron scar from OLT c/d/i and dressing over former drain site in RLQ; appropriately tender   Genitourinary:   Genitourinary Comments: Severe scrotal edema   Musculoskeletal: He exhibits edema.   Neurological: He is alert and oriented to person, place, and time.   Skin: Skin is warm and dry. No erythema.   Nursing note and vitals reviewed.    Laboratory:  Immunosuppressants         Stop Route Frequency     tacrolimus (PROGRAF) 1 mg/mL oral syringe      -- Oral 2 times daily     mycophenolate mofetil 200 mg/mL suspension 1,000 mg      -- Oral 2 times daily        CBC:   Recent Labs  Lab 07/28/18  0400   WBC 3.14*   RBC 2.15*   HGB 7.0*   HCT 21.5*   PLT 33*   *   MCH 32.6*   MCHC 32.6     CMP:   Recent Labs  Lab 07/28/18  0400 07/28/18  1200   *  221*  221* 199*   CALCIUM 7.0*  7.0*  7.0* 7.5*   ALBUMIN 2.2*  2.2*  --    PROT 3.8*  --      139  139 138   K 4.3  4.3  4.3 4.3   CO2 24  24  24 21*     110  110 112*   BUN 9  9  9 8   CREATININE 0.7  0.7  0.7 0.8   ALKPHOS 111  --    ALT 67*  --    AST 41*  --    BILITOT 0.8  --      Coagulation:   Recent Labs  Lab 07/28/18  0400   INR 1.6*   APTT 28.8     Labs within the past 24 hours have been reviewed.    Diagnostic Results:  I have personally reviewed all pertinent imaging studies.    Assessment/Plan:   Alon Adamson is a 63 y.o. male     GI   * Liver  transplanted    63 year old male with ESLD 2/2 EtOH and ESRD s/p liver transplant 7/23/18    Neuro:   - Sedation: None  - Pain control: PRN PO Oxycodone  - Continue Keppra     Pulmonary:   - 2LNC; wean as tolerated  - CXR with small right sided pleural effusion. Monitor  - IS/OOBTC     Cardiac:  - Drips: None, Vasopressin weaned off  - New asymptomatic bradycardia. Junctional bradycardia on EKG  - Atropine at bedside, pacer pads in place  - Cardiology agrees with atropine for any hemodynamic instability, otherwise no intervention required     Renal:   - BUN/CR: 8/0.8  - CRRT per Nephrology, will discuss with team about transitioning to HD   - Trend BMP     Infectious Disease:   - Afebrile, WBC normal   - Immunosuppression with Tacro, Cellcept, MPN  - Opp infection ppx with Bactrim, Valcyte, Nystatin  - Rocephin x7 days for E. Coli+ Bcx in donor  - Trend WBC     Hematology/Oncology:  - H/H stable  - INR 1.6  - Trend coags  - Trend CBC     Endocrine:  - Insulin gtt     Fluids/Electrolytes/Nutrition/GI:   - Soft puree diet  - Trend CMP  - Replace Lytes PRN      Dispo:  Continue SICU care.   Primary team: Transplant Surgery            The patients clinical status was discussed at multidisplinary rounds, involving transplant surgery, transplant medicine, pharmacy, nursing, nutrition, and social work.    Rudy Grimm MD  Liver Transplant  [facility]

## 2018-07-28 NOTE — PLAN OF CARE
Steroid induced hyperglycemia.  MPDN taper.    BG and insulin regimen reviewed.    BG goal 140-180.  Patient's BG above goal.      Recommend:  Increasing Novolog to 4u AC  Low dose correction  POC AC/HS    May need to begin basal.   We will continue to follow.  Please call endocrinology with any questions.        Alesia Telles MD  Endocrinology Fellow

## 2018-07-28 NOTE — PROGRESS NOTES
[facility]  Critical Care - Surgery  Progress Note    Patient Name: Alon Adamson  MRN: [id number]  Admission Date: 6/22/2018  Hospital Length of Stay: 36 days  Code Status: Full Code  Attending Provider: Williams Moon Jr., MD  Primary Care Provider: Mckinley Vides MD   Principal Problem: Liver transplanted    Subjective:     Hospital/ICU Course:  Per tranplant, Patient is listed for liver transplant, placed on internal hold 6/25 for HE then status 7 on 6/26 due to change in prescription coverage. He was re-activated on the list 7/3, MELD 22. Pt started on IV diuretics 6/22 and continued 6/23 and 6/24.  BLE edema and ascites sign if improved with diuresis and kidney function also improved. During admission, patient had worsening encephalopathy and asterixis prompting infectious work up, all blood and urine cultures obtained during admission with NGTD. Intermittently, encephalopathy severe enough to warrant lactulose enemas. CT head obtained and negative. Paracentesis 6/25 negative for peritonitis per cell count and U/A negative. Pt also with concern for BLE cellulitis- upper legs bright red with lower BLE still with dark appearance of venous stasis. Placed on vancomycin with improvement in BLE redness 6/26. Vanc continued for 7 days (ended 7/1). His HE waxed and waned for several days requiring lactulose enemas. Pt eating minimally during period of encephalopathy requiring gentle hydration with IVF 6/26. Of note, taco placement attempted 6/26 but unable secondary to agitation upon insertion prompting self resolving nose bleed. Micro lab called 6/26 PM with blood cx + for G+C in blood- pt continued on vanc which was started 6/25. ID consulted, suspect + blood cultures contaminant. EEG obtained 6/26 as pt with h/o serizures and negative. Pt continued on home Keppra. VICKY on admission initially improved with diuresis, but Cr sakina intermittently during admission likely related to aggressive  diuresis. Of note, with chest pain overnight 6/26-6/27 that resolved without intervention and pt reported as mild.  EKG NSR with PVC, troponin 0.1 in setting of hypervolemia + VICKY.  Normal dobutamine stress 1/2018.  Cardiology consulted and felt not ACS, no need for further workup other than continue to treat current risk factors for CAD with ASA (pt already on ASA and statin as outpt) + BB for portal hypertension which was started. Repeat paracentesis 7/10, 4.8L off, no SBP. Hypernatremia noted 7/9, nephrology consulted to aid in management. Hypernatremia attributed to dehydration from frequent BM's, diuretics were held, d/c'd creon due to excessive diarrhea, and patient was treated with increasing po free water and IVF. Echo 7/9 with normal EF, no wall abnormality, mild tricuspid/mitral regurg.     Overnight from 7/12 to 7/13, pt became hypothermic to 91.2 corrected with warming blankets, experienced worsening encephalopathy, hypotensive to 80/40.  Transferred to SICU.  Corrected with albumin 5% bolus.  Given additional lactulose.    7/14- no acute issues, ammonia level decreased, mentation improving    7/23-OLT  7/24 - unable to tolerate full CRRT due to hypotension, on vasopressin 0.04, unable to wean vent due to mental status, pulm function good, low PLT being monitored, no acute bleed.  AST/ALT improving.  7/25 - extubated, vasopressin weaned off, received CRRT, 1U PLT  7/26 - no acute events, mental status much better  7/27 - CRRT paused for hypotension, needed to be restarted on vaso 0.04, asymptomatic bradycardia    Interval History/Significant Events: NAEON, AFVSS.  Very good mentation today.  Vaso on 0.04 non-titrating due to hypotension during CRRT.  Asymptomatic bradycardia after starting vaso.    Follow-up For: Procedure(s) (LRB):  TRANSPLANT, LIVER (N/A)    Post-Operative Day: 5 Days Post-Op    Objective:     Vital Signs (Most Recent):  Temp: 98.5 °F (36.9 °C) (07/28/18 0300)  Pulse: (!) 41 (07/28/18  0645)  Resp: 20 (07/28/18 0645)  BP: 123/61 (07/27/18 1930)  SpO2: 100 % (07/28/18 0645) Vital Signs (24h Range):  Temp:  [98.3 °F (36.8 °C)-98.5 °F (36.9 °C)] 98.5 °F (36.9 °C)  Pulse:  [33-59] 41  Resp:  [11-44] 20  SpO2:  [94 %-100 %] 100 %  BP: ()/(37-61) 123/61  Arterial Line BP: ()/(29-49) 141/36     Weight: 125.8 kg (277 lb 5.4 oz)  Body mass index is 35.61 kg/m².      Intake/Output Summary (Last 24 hours) at 07/28/18 0713  Last data filed at 07/28/18 0600   Gross per 24 hour   Intake             6633 ml   Output             7742 ml   Net            -1109 ml       Physical Exam   Constitutional: He is oriented to person, place, and time.   Appears severely ill, bitemporal wasting   HENT:   Head: Normocephalic.   Right Ear: External ear normal.   Left Ear: External ear normal.   Eyes: EOM are normal. Pupils are equal, round, and reactive to light. Right eye exhibits no discharge. Left eye exhibits no discharge.   Cardiovascular: Regular rhythm and normal heart sounds.  Exam reveals no gallop and no friction rub.    No murmur heard.  Sinus bradycardia   Pulmonary/Chest: Effort normal and breath sounds normal. No respiratory distress. He has no wheezes. He has no rales. He exhibits no tenderness.   Abdominal: Soft. He exhibits no distension and no mass. There is tenderness. There is no rebound and no guarding. No hernia.   Chevron scar from OLT c/d/i and dressing over former drain site in RLQ; appropriately tender   Genitourinary:   Genitourinary Comments: Severe scrotal edema   Musculoskeletal: He exhibits edema.   Neurological: He is alert and oriented to person, place, and time.   Skin: Skin is warm and dry. No erythema.   Nursing note and vitals reviewed.      Vents:  Vent Mode: Spont (07/25/18 0929)  Set Rate: 16 bmp (07/25/18 0700)  Vt Set: 500 mL (07/23/18 1446)  Pressure Support: 10 cmH20 (07/25/18 0929)  PEEP/CPAP: 5 cmH20 (07/25/18 0929)  Oxygen Concentration (%): 28 (07/27/18 4735)  Peak  Airway Pressure: 16 cmH2O (07/25/18 0929)  Plateau Pressure: 12 cmH20 (07/25/18 0929)  Total Ve: 14.1 mL (07/25/18 0929)  Negative Inspiratory Force (cm H2O): -21 (07/25/18 1020)  F/VT Ratio<105 (RSBI): (!) 24.86 (07/25/18 0700)    Lines/Drains/Airways     Central Venous Catheter Line                 Introducer 07/23/18 0500 right internal jugular 5 days         Percutaneous Central Line Insertion/Assessment - double lumen  07/23/18 0500 right internal jugular 5 days         Percutaneous Central Line Insertion/Assessment - triple lumen  07/23/18 0530 right internal jugular 5 days          Drain                 Urethral Catheter 07/21/18 1706 6 days         Rectal Tube 07/27/18 0930 rectal tube w/ balloon (indicate number of mLs) less than 1 day          Arterial Line                 Arterial Line 07/26/18 1500 Right Radial 1 day          Peripheral Intravenous Line                 Midline Catheter Insertion/Assessment  - Single Lumen 07/20/18 1128 Right cephalic vein (lateral side of arm) 18g x 10cm 7 days                Significant Labs:    CBC/Anemia Profile:    Recent Labs  Lab 07/26/18  2210 07/27/18  0304 07/28/18  0400   WBC 4.18 3.91 3.14*   HGB 7.4* 7.4* 7.0*   HCT 22.2* 22.2* 21.5*   PLT 47* 44* 33*   MCV 97 98 100*   RDW 17.5* 17.2* 16.9*        Chemistries:    Recent Labs  Lab 07/26/18  1345  07/27/18  0304 07/27/18  1030 07/27/18  1924 07/27/18  2144 07/28/18  0400     < > 137  137  137 136  136 135* 136 139  139  139   K 4.4  < > 4.3  4.3  4.3 4.2  4.2 4.2 4.2 4.3  4.3  4.3     < > 103  103  103 104  104 106 105 110  110  110   CO2 29  < > 28  28  28 27  27 25 26 24  24  24   BUN 19  < > 12  12  12 10  10 10 9 9  9  9   CREATININE 1.4  < > 0.9  0.9  0.9 0.8  0.8 0.8 0.7 0.7  0.7  0.7   CALCIUM 7.3*  < > 7.0*  7.0*  7.0* 6.8*  6.8* 7.1* 6.8* 7.0*  7.0*  7.0*   ALBUMIN 2.4*  < > 2.3*  2.3* 2.3*  --  2.2* 2.2*  2.2*   PROT 4.0*  --  3.8*  --   --   --  3.8*    BILITOT 1.4*  --  1.1*  --   --   --  0.8   ALKPHOS 115  --  112  --   --   --  111   ALT 69*  --  63*  --   --   --  67*   AST 50*  --  45*  --   --   --  41*   MG  --   < > 2.0 1.9  --  1.6 2.1   PHOS 4.1  < > 3.2  3.2 2.9  --  2.1* 3.4  3.4   < > = values in this interval not displayed.    All pertinent labs within the past 24 hours have been reviewed.    Significant Imaging:  I have reviewed all pertinent imaging results/findings within the past 24 hours.    Assessment/Plan:     Hepatic encephalopathy    63 yr old male with decompensated alcoholic cirrhosis, CKD III and CAD. Hospitalized with HE, VICKY on CKD. S/p OLT on 7/23 with intraop CRRT. Brought to the ICU intubated, not requiring pressor support. Significant blood loss, with aggressive resuscitation intraop    Neuro  -hx seizures and HE  -keppra  -[id number]    Cardiovascular  - cards consulted for junctional bradycardia; determined it was sinus bradycardia 2/2 electrolyte derangement and recommended keeping atropine at bedside in case it becomes symptomatic  - h/o CAD  - 2D Echo 7/9 normal EF (55-60%), trivial tricuspid/mitral regurg  - vasopressin weaned off 7/25, restarted 7/27 for hypotension during CRRT, radial A-line placed.  - bradycardia since 7/27 after starting vaso, possible baroreceptor reflex    Respiratory:  - extubated 7/25  - no acute issues    Renal:  -VICKY on CKD-intraop CRRT, anuric  -Nephrology following, on CRRT  -[place], BUN/Cr    Liver and Pancreas  -s/p OLT 7/23   -Trend liver enzymes, enzymes and Tbili improving, but PT/INR worse than yesterday  -PLTs low.  Transfuse per Transplant Surgery    GI  -Clear liquids  -Replete lytes prn       ID  -Anti-rejection, antiretrovirals as per primary  -monitor WBC    Heme  -h/h stable, thrombocytopenia. 14 L blood loss intraop.   RBC (Units) 9   FFP (Units) 2   Cryoprecipitate (Units)  2   Platelets (Units) 2   -PLT transfusion per Transplant Surgery  -some heme output in OG tube, continue to  monitor CBC and output  -Hep 5k DVT ppx    Dispo  -Primary team Transplant Surgery  -Monitor platelets, any bleeding, and other LFTs  -Wean vasopressin and CRRT  -Continue SICU care          Critical care was time spent personally by me on the following activities: development of treatment plan with patient or surrogate and bedside caregivers, discussions with consultants, evaluation of patient's response to treatment, examination of patient, ordering and performing treatments and interventions, ordering and review of laboratory studies, ordering and review of radiographic studies, pulse oximetry, re-evaluation of patient's condition.  This critical care time did not overlap with that of any other provider or involve time for any procedures.     Ernesto Fraire MD  Critical Care - Surgery  [facility]

## 2018-07-28 NOTE — SUBJECTIVE & OBJECTIVE
Interval History: Patient states he has felt well overnight depsite HR dropping into 30s. BP has maintained in the 120s-130s. Overall asymptomatic with telemetry showing regular bradycardia with non-discernible p-waves.    Review of Systems   Constitution: Negative for chills, fever, weakness and weight gain.   HENT: Negative for congestion.    Eyes: Negative for visual disturbance.   Cardiovascular: Negative for chest pain, claudication, dyspnea on exertion, leg swelling, orthopnea, palpitations and syncope.   Respiratory: Negative for cough, shortness of breath and snoring.    Hematologic/Lymphatic: Does not bruise/bleed easily.   Skin: Negative for rash.   Musculoskeletal: Positive for muscle cramps. Negative for myalgias.   Gastrointestinal: Negative for bloating, abdominal pain, constipation, diarrhea and melena.   Genitourinary: Negative for bladder incontinence.   Neurological: Negative for excessive daytime sleepiness and focal weakness.   Psychiatric/Behavioral: Negative for depression and suicidal ideas.     Objective:     Vital Signs (Most Recent):  Temp: 98.3 °F (36.8 °C) (07/28/18 0700)  Pulse: (!) 51 (07/28/18 1000)  Resp: 16 (07/28/18 1000)  BP: 123/61 (07/27/18 1930)  SpO2: 100 % (07/28/18 1000) Vital Signs (24h Range):  Temp:  [98.3 °F (36.8 °C)-98.5 °F (36.9 °C)] 98.3 °F (36.8 °C)  Pulse:  [32-63] 51  Resp:  [11-44] 16  SpO2:  [94 %-100 %] 100 %  BP: ()/(37-61) 123/61  Arterial Line BP: ()/(29-49) 142/39     Weight: 125.8 kg (277 lb 5.4 oz)  Body mass index is 35.61 kg/m².     SpO2: 100 %  O2 Device (Oxygen Therapy): room air      Intake/Output Summary (Last 24 hours) at 07/28/18 1002  Last data filed at 07/28/18 0800   Gross per 24 hour   Intake             6043 ml   Output             7481 ml   Net            -1438 ml       Lines/Drains/Airways     Central Venous Catheter Line                 Introducer 07/23/18 0500 right internal jugular 5 days         Percutaneous Central Line  Insertion/Assessment - double lumen  07/23/18 0500 right internal jugular 5 days         Percutaneous Central Line Insertion/Assessment - triple lumen  07/23/18 0530 right internal jugular 5 days          Drain                 Urethral Catheter 07/21/18 1706 6 days         Rectal Tube 07/27/18 0930 rectal tube w/ balloon (indicate number of mLs) 1 day          Arterial Line                 Arterial Line 07/26/18 1500 Right Radial 1 day          Peripheral Intravenous Line                 Midline Catheter Insertion/Assessment  - Single Lumen 07/20/18 1128 Right cephalic vein (lateral side of arm) 18g x 10cm 7 days                Physical Exam   Constitutional: He is oriented to person, place, and time. He appears well-developed and well-nourished. No distress.   obese   HENT:   Head: Normocephalic and atraumatic.   Mouth/Throat: Oropharynx is clear and moist.   Eyes: Conjunctivae and EOM are normal. Pupils are equal, round, and reactive to light. No scleral icterus.   Neck: Normal range of motion. Neck supple. No JVD present.   Cardiovascular: Regular rhythm and intact distal pulses.  Bradycardia present.  Exam reveals distant heart sounds.    No murmur heard.  Pulses:       Radial pulses are 2+ on the right side, and 2+ on the left side.        Dorsalis pedis pulses are 1+ on the right side, and 1+ on the left side.        Posterior tibial pulses are 1+ on the right side, and 1+ on the left side.   Pulmonary/Chest: Effort normal and breath sounds normal. No respiratory distress.   Symmetrical expansion   Abdominal: Soft. Bowel sounds are normal. There is no hepatosplenomegaly. There is no tenderness.   Musculoskeletal: Normal range of motion. He exhibits edema.   Neurological: He is alert and oriented to person, place, and time. No cranial nerve deficit.   Skin: Skin is warm and dry. No rash noted. He is not diaphoretic. There is erythema.   Consistent with venous stasis dermatitis   Psychiatric: He has a normal  mood and affect. Judgment and thought content normal.       Significant Labs:   Blood Culture:   Recent Labs  Lab 07/27/18  1724 07/27/18  1725   LABBLOO No Growth to date No Growth to date   , BMP:   Recent Labs  Lab 07/27/18  1030 07/27/18  1924 07/27/18 2144 07/28/18  0400   *  166* 211* 226* 221*  221*  221*     136 135* 136 139  139  139   K 4.2  4.2 4.2 4.2 4.3  4.3  4.3     104 106 105 110  110  110   CO2 27  27 25 26 24  24  24   BUN 10  10 10 9 9  9  9   CREATININE 0.8  0.8 0.8 0.7 0.7  0.7  0.7   CALCIUM 6.8*  6.8* 7.1* 6.8* 7.0*  7.0*  7.0*   MG 1.9  --  1.6 2.1   , CMP   Recent Labs  Lab 07/26/18  1345  07/27/18  0304 07/27/18  1030 07/27/18  1924 07/27/18  2144 07/28/18  0400     < > 137  137  137 136  136 135* 136 139  139  139   K 4.4  < > 4.3  4.3  4.3 4.2  4.2 4.2 4.2 4.3  4.3  4.3     < > 103  103  103 104  104 106 105 110  110  110   CO2 29  < > 28  28  28 27  27 25 26 24  24  24   *  < > 146*  146*  146* 166*  166* 211* 226* 221*  221*  221*   BUN 19  < > 12  12  12 10  10 10 9 9  9  9   CREATININE 1.4  < > 0.9  0.9  0.9 0.8  0.8 0.8 0.7 0.7  0.7  0.7   CALCIUM 7.3*  < > 7.0*  7.0*  7.0* 6.8*  6.8* 7.1* 6.8* 7.0*  7.0*  7.0*   PROT 4.0*  --  3.8*  --   --   --  3.8*   ALBUMIN 2.4*  < > 2.3*  2.3* 2.3*  --  2.2* 2.2*  2.2*   BILITOT 1.4*  --  1.1*  --   --   --  0.8   ALKPHOS 115  --  112  --   --   --  111   AST 50*  --  45*  --   --   --  41*   ALT 69*  --  63*  --   --   --  67*   ANIONGAP 6*  < > 6*  6*  6* 5*  5* 4* 5* 5*  5*  5*   ESTGFRAFRICA >60.0  < > >60.0  >60.0  >60.0 >60.0  >60.0 >60.0 >60.0 >60.0  >60.0  >60.0   EGFRNONAA 53.5*  < > >60.0  >60.0  >60.0 >60.0  >60.0 >60.0 >60.0 >60.0  >60.0  >60.0   < > = values in this interval not displayed., CBC   Recent Labs  Lab 07/26/18  2210 07/27/18  0304 07/28/18  0400   WBC 4.18 3.91 3.14*   HGB 7.4* 7.4* 7.0*   HCT 22.2*  22.2* 21.5*   PLT 47* 44* 33*   , Lipid Panel No results for input(s): CHOL, HDL, LDLCALC, TRIG, CHOLHDL in the last 48 hours. and Troponin No results for input(s): TROPONINI in the last 48 hours.    Significant Imaging:   EKG: sinus bradycardia with diminished p-wave amplitude in Iimb leads (except V2), no ST-T changes

## 2018-07-28 NOTE — PLAN OF CARE
Primary team paged Cardiology regarding pt's HR 30-35's. Pt was seen and assessed. BP stable. Pt actively on CRRT with low dose vasopressin. Currently pt is asymptomatic. EKG reveals sinus bradycardia. Will continue to monitor for now. Pacer pads and atropine are at pt's bedside to be used when symptomatic or hemodynamic compromise.     Adelaida Holbrook, Cardiology Fellow, PGY-4

## 2018-07-28 NOTE — PROGRESS NOTES
CRRT NOTES    Clotted at 1AM. No rinse back done. Restarted at 0315H with Cotto Dialyzer, will evaluate needing for citrate/calcium once clotted on this run    Daily checks done. Negative test on RO.

## 2018-07-29 LAB
ALBUMIN SERPL BCP-MCNC: 2.4 G/DL
ALBUMIN SERPL BCP-MCNC: 2.4 G/DL
ALP SERPL-CCNC: 95 U/L
ALT SERPL W/O P-5'-P-CCNC: 56 U/L
ANION GAP SERPL CALC-SCNC: 6 MMOL/L
ANISOCYTOSIS BLD QL SMEAR: SLIGHT
APTT BLDCRRT: 29.9 SEC
AST SERPL-CCNC: 27 U/L
BASOPHILS # BLD AUTO: 0 K/UL
BASOPHILS NFR BLD: 0 %
BILIRUB DIRECT SERPL-MCNC: 0.5 MG/DL
BILIRUB SERPL-MCNC: 0.7 MG/DL
BLD PROD TYP BPU: NORMAL
BLOOD UNIT EXPIRATION DATE: NORMAL
BLOOD UNIT TYPE CODE: 6200
BLOOD UNIT TYPE: NORMAL
BUN SERPL-MCNC: 17 MG/DL
CALCIUM SERPL-MCNC: 7 MG/DL
CHLORIDE SERPL-SCNC: 111 MMOL/L
CO2 SERPL-SCNC: 21 MMOL/L
CODING SYSTEM: NORMAL
CREAT SERPL-MCNC: 1.3 MG/DL
DIFFERENTIAL METHOD: ABNORMAL
DISPENSE STATUS: NORMAL
EOSINOPHIL # BLD AUTO: 0 K/UL
EOSINOPHIL NFR BLD: 0.8 %
ERYTHROCYTE [DISTWIDTH] IN BLOOD BY AUTOMATED COUNT: 17.2 %
EST. GFR  (AFRICAN AMERICAN): >60 ML/MIN/1.73 M^2
EST. GFR  (NON AFRICAN AMERICAN): 58.5 ML/MIN/1.73 M^2
GGT SERPL-CCNC: 136 U/L
GLUCOSE SERPL-MCNC: 207 MG/DL
HCT VFR BLD AUTO: 21.2 %
HGB BLD-MCNC: 7.1 G/DL
HYPOCHROMIA BLD QL SMEAR: ABNORMAL
IMM GRANULOCYTES # BLD AUTO: 0.03 K/UL
IMM GRANULOCYTES NFR BLD AUTO: 0.8 %
INR PPP: 1.5
LYMPHOCYTES # BLD AUTO: 0.4 K/UL
LYMPHOCYTES NFR BLD: 11.5 %
MAGNESIUM SERPL-MCNC: 2.1 MG/DL
MCH RBC QN AUTO: 32.3 PG
MCHC RBC AUTO-ENTMCNC: 33.5 G/DL
MCV RBC AUTO: 96 FL
MONOCYTES # BLD AUTO: 0.3 K/UL
MONOCYTES NFR BLD: 8.7 %
NEUTROPHILS # BLD AUTO: 2.8 K/UL
NEUTROPHILS NFR BLD: 78.2 %
NRBC BLD-RTO: 0 /100 WBC
OVALOCYTES BLD QL SMEAR: ABNORMAL
PHOSPHATE SERPL-MCNC: 3.5 MG/DL
PHOSPHATE SERPL-MCNC: 3.5 MG/DL
PLATELET # BLD AUTO: 32 K/UL
PMV BLD AUTO: 12.1 FL
POCT GLUCOSE: 156 MG/DL (ref 70–110)
POCT GLUCOSE: 188 MG/DL (ref 70–110)
POCT GLUCOSE: 224 MG/DL (ref 70–110)
POCT GLUCOSE: 239 MG/DL (ref 70–110)
POCT GLUCOSE: 262 MG/DL (ref 70–110)
POIKILOCYTOSIS BLD QL SMEAR: SLIGHT
POLYCHROMASIA BLD QL SMEAR: ABNORMAL
POTASSIUM SERPL-SCNC: 4.2 MMOL/L
PROT SERPL-MCNC: 3.7 G/DL
PROTHROMBIN TIME: 15 SEC
RBC # BLD AUTO: 2.2 M/UL
SODIUM SERPL-SCNC: 138 MMOL/L
TACROLIMUS BLD-MCNC: 2.4 NG/ML
TRANS PLATPHERESIS VOL PATIENT: NORMAL ML
WBC # BLD AUTO: 3.58 K/UL

## 2018-07-29 PROCEDURE — 85610 PROTHROMBIN TIME: CPT

## 2018-07-29 PROCEDURE — 25000003 PHARM REV CODE 250: Performed by: STUDENT IN AN ORGANIZED HEALTH CARE EDUCATION/TRAINING PROGRAM

## 2018-07-29 PROCEDURE — 25000003 PHARM REV CODE 250: Performed by: TRANSPLANT SURGERY

## 2018-07-29 PROCEDURE — P9035 PLATELET PHERES LEUKOREDUCED: HCPCS

## 2018-07-29 PROCEDURE — 82977 ASSAY OF GGT: CPT

## 2018-07-29 PROCEDURE — 63600175 PHARM REV CODE 636 W HCPCS: Performed by: INTERNAL MEDICINE

## 2018-07-29 PROCEDURE — 85730 THROMBOPLASTIN TIME PARTIAL: CPT

## 2018-07-29 PROCEDURE — 63600175 PHARM REV CODE 636 W HCPCS: Performed by: PEDIATRICS

## 2018-07-29 PROCEDURE — 84100 ASSAY OF PHOSPHORUS: CPT

## 2018-07-29 PROCEDURE — 80053 COMPREHEN METABOLIC PANEL: CPT

## 2018-07-29 PROCEDURE — 99291 CRITICAL CARE FIRST HOUR: CPT | Mod: ,,, | Performed by: ANESTHESIOLOGY

## 2018-07-29 PROCEDURE — 82248 BILIRUBIN DIRECT: CPT

## 2018-07-29 PROCEDURE — 99232 SBSQ HOSP IP/OBS MODERATE 35: CPT | Mod: GC,,, | Performed by: INTERNAL MEDICINE

## 2018-07-29 PROCEDURE — 20000000 HC ICU ROOM

## 2018-07-29 PROCEDURE — 25000003 PHARM REV CODE 250: Performed by: SURGERY

## 2018-07-29 PROCEDURE — 85025 COMPLETE CBC W/AUTO DIFF WBC: CPT

## 2018-07-29 PROCEDURE — 83735 ASSAY OF MAGNESIUM: CPT

## 2018-07-29 PROCEDURE — 63600175 PHARM REV CODE 636 W HCPCS: Performed by: SURGERY

## 2018-07-29 PROCEDURE — 80197 ASSAY OF TACROLIMUS: CPT

## 2018-07-29 PROCEDURE — 63600175 PHARM REV CODE 636 W HCPCS: Performed by: STUDENT IN AN ORGANIZED HEALTH CARE EDUCATION/TRAINING PROGRAM

## 2018-07-29 PROCEDURE — 25000003 PHARM REV CODE 250: Performed by: PEDIATRICS

## 2018-07-29 PROCEDURE — 25000003 PHARM REV CODE 250: Performed by: INTERNAL MEDICINE

## 2018-07-29 RX ORDER — FUROSEMIDE 10 MG/ML
100 INJECTION INTRAMUSCULAR; INTRAVENOUS ONCE
Status: COMPLETED | OUTPATIENT
Start: 2018-07-29 | End: 2018-07-29

## 2018-07-29 RX ORDER — TACROLIMUS 1 MG/1
2 CAPSULE ORAL 2 TIMES DAILY
Status: DISCONTINUED | OUTPATIENT
Start: 2018-07-29 | End: 2018-07-29

## 2018-07-29 RX ORDER — TACROLIMUS 1 MG/1
4 CAPSULE ORAL 2 TIMES DAILY
Status: DISCONTINUED | OUTPATIENT
Start: 2018-07-29 | End: 2018-07-30

## 2018-07-29 RX ORDER — LEVETIRACETAM 500 MG/1
500 TABLET ORAL 2 TIMES DAILY
Status: DISCONTINUED | OUTPATIENT
Start: 2018-07-29 | End: 2018-08-17 | Stop reason: HOSPADM

## 2018-07-29 RX ORDER — PANTOPRAZOLE SODIUM 40 MG/1
40 TABLET, DELAYED RELEASE ORAL 2 TIMES DAILY
Status: DISCONTINUED | OUTPATIENT
Start: 2018-07-29 | End: 2018-08-17 | Stop reason: HOSPADM

## 2018-07-29 RX ORDER — FUROSEMIDE 10 MG/ML
100 INJECTION INTRAMUSCULAR; INTRAVENOUS 2 TIMES DAILY
Status: DISCONTINUED | OUTPATIENT
Start: 2018-07-30 | End: 2018-08-03

## 2018-07-29 RX ORDER — MYCOPHENOLATE MOFETIL 250 MG/1
1000 CAPSULE ORAL 2 TIMES DAILY
Status: DISCONTINUED | OUTPATIENT
Start: 2018-07-29 | End: 2018-08-14

## 2018-07-29 RX ADMIN — ACYCLOVIR 400 MG: 200 CAPSULE ORAL at 08:07

## 2018-07-29 RX ADMIN — MYCOPHENOLATE MOFETIL 1000 MG: 250 CAPSULE ORAL at 09:07

## 2018-07-29 RX ADMIN — ACYCLOVIR 400 MG: 200 CAPSULE ORAL at 09:07

## 2018-07-29 RX ADMIN — ATORVASTATIN CALCIUM 40 MG: 20 TABLET, FILM COATED ORAL at 09:07

## 2018-07-29 RX ADMIN — CEFTRIAXONE SODIUM 2 G: 2 INJECTION, POWDER, FOR SOLUTION INTRAMUSCULAR; INTRAVENOUS at 05:07

## 2018-07-29 RX ADMIN — SULFAMETHOXAZOLE AND TRIMETHOPRIM 1 TABLET: 400; 80 TABLET ORAL at 08:07

## 2018-07-29 RX ADMIN — THIAMINE HCL TAB 100 MG 100 MG: 100 TAB at 09:07

## 2018-07-29 RX ADMIN — INSULIN ASPART 4 UNITS: 100 INJECTION, SOLUTION INTRAVENOUS; SUBCUTANEOUS at 06:07

## 2018-07-29 RX ADMIN — INSULIN ASPART 2 UNITS: 100 INJECTION, SOLUTION INTRAVENOUS; SUBCUTANEOUS at 10:07

## 2018-07-29 RX ADMIN — DOCUSATE SODIUM 100 MG: 100 CAPSULE, LIQUID FILLED ORAL at 09:07

## 2018-07-29 RX ADMIN — OXYCODONE HYDROCHLORIDE AND ACETAMINOPHEN 1000 MG: 500 TABLET ORAL at 09:07

## 2018-07-29 RX ADMIN — TACROLIMUS 4 MG: 1 CAPSULE ORAL at 05:07

## 2018-07-29 RX ADMIN — INSULIN ASPART 4 UNITS: 100 INJECTION, SOLUTION INTRAVENOUS; SUBCUTANEOUS at 12:07

## 2018-07-29 RX ADMIN — PREDNISONE 20 MG: 20 TABLET ORAL at 09:07

## 2018-07-29 RX ADMIN — INSULIN DETEMIR 12 UNITS: 100 INJECTION, SOLUTION SUBCUTANEOUS at 09:07

## 2018-07-29 RX ADMIN — LEVETIRACETAM 500 MG: 500 TABLET ORAL at 08:07

## 2018-07-29 RX ADMIN — INSULIN ASPART 4 UNITS: 100 INJECTION, SOLUTION INTRAVENOUS; SUBCUTANEOUS at 08:07

## 2018-07-29 RX ADMIN — FLUCONAZOLE 200 MG: 200 TABLET ORAL at 09:07

## 2018-07-29 RX ADMIN — TRIAMCINOLONE ACETONIDE: 1 CREAM TOPICAL at 08:07

## 2018-07-29 RX ADMIN — FUROSEMIDE 100 MG: 10 INJECTION, SOLUTION INTRAMUSCULAR; INTRAVENOUS at 11:07

## 2018-07-29 RX ADMIN — PANTOPRAZOLE SODIUM 40 MG: 40 TABLET, DELAYED RELEASE ORAL at 11:07

## 2018-07-29 RX ADMIN — HEPARIN SODIUM 5000 UNITS: 5000 INJECTION, SOLUTION INTRAVENOUS; SUBCUTANEOUS at 04:07

## 2018-07-29 RX ADMIN — TRIAMCINOLONE ACETONIDE: 1 CREAM TOPICAL at 09:07

## 2018-07-29 RX ADMIN — TACROLIMUS 2 MG: 1 CAPSULE ORAL at 08:07

## 2018-07-29 RX ADMIN — INSULIN ASPART 2 UNITS: 100 INJECTION, SOLUTION INTRAVENOUS; SUBCUTANEOUS at 01:07

## 2018-07-29 RX ADMIN — HEPARIN SODIUM 5000 UNITS: 5000 INJECTION, SOLUTION INTRAVENOUS; SUBCUTANEOUS at 06:07

## 2018-07-29 RX ADMIN — HEPARIN SODIUM 5000 UNITS: 5000 INJECTION, SOLUTION INTRAVENOUS; SUBCUTANEOUS at 09:07

## 2018-07-29 RX ADMIN — LEVETIRACETAM 500 MG: 500 TABLET ORAL at 09:07

## 2018-07-29 RX ADMIN — PANTOPRAZOLE SODIUM 40 MG: 40 TABLET, DELAYED RELEASE ORAL at 08:07

## 2018-07-29 NOTE — SUBJECTIVE & OBJECTIVE
Interval History/Significant Events: NAEON, AFVSS, A&Ox4, 1U PLT ordered for this morning.  Vaso off.    Follow-up For: Procedure(s) (LRB):  TRANSPLANT, LIVER (N/A)    Post-Operative Day: 6 Days Post-Op    Objective:     Vital Signs (Most Recent):  Temp: 98.4 °F (36.9 °C) (07/29/18 0626)  Pulse: (!) 46 (07/29/18 0626)  Resp: 19 (07/29/18 0626)  BP: (!) 123/38 (07/29/18 0600)  SpO2: 100 % (07/29/18 0626) Vital Signs (24h Range):  Temp:  [98 °F (36.7 °C)-98.5 °F (36.9 °C)] 98.4 °F (36.9 °C)  Pulse:  [32-78] 46  Resp:  [14-27] 19  SpO2:  [95 %-100 %] 100 %  BP: (104-140)/(38-65) 123/38  Arterial Line BP: (110-156)/(27-48) 125/40     Weight: 128.9 kg (284 lb 2.8 oz)  Body mass index is 36.49 kg/m².      Intake/Output Summary (Last 24 hours) at 07/29/18 0737  Last data filed at 07/29/18 0600   Gross per 24 hour   Intake             2797 ml   Output             2932 ml   Net             -135 ml       Physical Exam   Constitutional: He is oriented to person, place, and time.   Appears severely ill, bitemporal wasting   HENT:   Head: Normocephalic.   Right Ear: External ear normal.   Left Ear: External ear normal.   Eyes: EOM are normal. Pupils are equal, round, and reactive to light. Right eye exhibits no discharge. Left eye exhibits no discharge.   Cardiovascular: Regular rhythm and normal heart sounds.  Exam reveals no gallop and no friction rub.    No murmur heard.  Sinus bradycardia   Pulmonary/Chest: Effort normal and breath sounds normal. No respiratory distress. He has no wheezes. He has no rales. He exhibits no tenderness.   Abdominal: Soft. He exhibits no distension and no mass. There is tenderness. There is no rebound and no guarding. No hernia.   Chevron scar from OLT c/d/i and dressing over former drain site in RLQ; appropriately tender   Genitourinary:   Genitourinary Comments: Severe scrotal edema   Musculoskeletal: He exhibits edema.   Neurological: He is alert and oriented to person, place, and time.    Skin: Skin is warm and dry. No erythema.   Nursing note and vitals reviewed.      Vents:  Vent Mode: Spont (07/25/18 0929)  Set Rate: 16 bmp (07/25/18 0700)  Vt Set: 500 mL (07/23/18 1446)  Pressure Support: 10 cmH20 (07/25/18 0929)  PEEP/CPAP: 5 cmH20 (07/25/18 0929)  Oxygen Concentration (%): 28 (07/27/18 0445)  Peak Airway Pressure: 16 cmH2O (07/25/18 0929)  Plateau Pressure: 12 cmH20 (07/25/18 0929)  Total Ve: 14.1 mL (07/25/18 0929)  Negative Inspiratory Force (cm H2O): -21 (07/25/18 1020)  F/VT Ratio<105 (RSBI): (!) 24.86 (07/25/18 0700)    Lines/Drains/Airways     Central Venous Catheter Line                 Introducer 07/23/18 0500 right internal jugular 6 days         Percutaneous Central Line Insertion/Assessment - double lumen  07/23/18 0500 right internal jugular 6 days         Percutaneous Central Line Insertion/Assessment - triple lumen  07/23/18 0530 right internal jugular 6 days          Drain                 Urethral Catheter 07/21/18 1706 7 days         Rectal Tube 07/27/18 0930 rectal tube w/ balloon (indicate number of mLs) 1 day          Arterial Line                 Arterial Line 07/26/18 1500 Right Radial 2 days          Peripheral Intravenous Line                 Midline Catheter Insertion/Assessment  - Single Lumen 07/20/18 1128 Right cephalic vein (lateral side of arm) 18g x 10cm 8 days                Significant Labs:    CBC/Anemia Profile:    Recent Labs  Lab 07/28/18  0400 07/29/18  0409   WBC 3.14* 3.58*   HGB 7.0* 7.1*   HCT 21.5* 21.2*   PLT 33* 32*   * 96   RDW 16.9* 17.2*        Chemistries:    Recent Labs  Lab 07/28/18  0400  07/28/18  1640 07/28/18  1916 07/29/18  0409     139  139  < > 137 139 138  138  138   K 4.3  4.3  4.3  < > 4.2 4.3 4.2  4.2  4.2     110  110  < > 112* 112* 111*  111*  111*   CO2 24  24  24  < > 20* 21* 21*  21*  21*   BUN 9  9  9  < > 12 14 17  17  17   CREATININE 0.7  0.7  0.7  < > 0.8 1.0 1.3  1.3  1.3    CALCIUM 7.0*  7.0*  7.0*  < > 7.0* 7.1* 7.0*  7.0*  7.0*   ALBUMIN 2.2*  2.2*  --  2.5*  --  2.4*  2.4*   PROT 3.8*  --   --   --  3.7*   BILITOT 0.8  --   --   --  0.7   ALKPHOS 111  --   --   --  95   ALT 67*  --   --   --  56*   AST 41*  --   --   --  27   MG 2.1  --  1.8  --  2.1   PHOS 3.4  3.4  --  2.2*  --  3.5  3.5   < > = values in this interval not displayed.    All pertinent labs within the past 24 hours have been reviewed.    Significant Imaging:  I have reviewed all pertinent imaging results/findings within the past 24 hours.

## 2018-07-29 NOTE — ASSESSMENT & PLAN NOTE
- No longer on pressors   - Had lasix 100 mg in which UOP picked up   - Will continue with Lasix 100 mg q BID   - No need for HD today, will reassess tomorrow if any need for HD trial.   - strict Is & Os, Bladder scans q BID   - Avoid nephrotoxic medications  - Hb > 7gm/dL

## 2018-07-29 NOTE — ASSESSMENT & PLAN NOTE
63 year old male with ESLD 2/2 EtOH and ESRD s/p liver transplant 7/23/18    Neuro:   - Sedation: None  - Pain control: PRN PO Oxycodone  - Continue Keppra for h/o seizures     Pulmonary:   - 2LNC; wean as tolerated  - CXR with small right sided pleural effusion. Monitor  - IS/OOBTC     Cardiac:  - Drips: None, Vasopressin weaned off  - New asymptomatic bradycardia. Junctional bradycardia on EKG  - Atropine at bedside, pacer pads in place  - Cardiology agrees with atropine for any hemodynamic instability, otherwise no intervention required     Renal:   - BUN/CR: 17/1.3  - CRRT per Nephrology, will discuss with team today about HD trial  - Trend BMP     Infectious Disease:   - Afebrile, WBC normal   - Immunosuppression with Tacro, Cellcept, MPN  - Opp infection ppx with Bactrim, Valcyte, Nystatin  - Rocephin x7 days for E. Coli+ Bcx in donor  - Trend WBC     Hematology/Oncology:  - H/H stable  - INR 1.5  - Trend coags  - Trend CBC     Endocrine:  - Insulin gtt     Fluids/Electrolytes/Nutrition/GI:   - Soft puree diet  - Trend CMP  - Replace Lytes PRN      Dispo:  Stable for stepdown to TSU  Primary team: Transplant Surgery

## 2018-07-29 NOTE — PROGRESS NOTES
[facility]  Liver Transplant  Progress Note    Patient Name: Alon Adamson  MRN: [id number]  Admission Date: 2018  Hospital Length of Stay: 37 days  Code Status: Full Code  Primary Care Provider: Mckinley Vides MD  Post-Op Day 6 Days Post-Op    Admit Diagnosis: ESLD, SERD  Procedures: OLTx    ORGAN:   LIVER  Disease Etiology: Alcoholic Cirrhosis  Donor Type:    - Brain Death  CDC High Risk:   No  Donor CMV Status:   Donor CMV Status: Negative  Donor HBcAB:   Negative  Donor HCV Status:   Negative  Donor HBV TAMAR: Negative  Donor HCV TAMAR: Negative  Whole or Partial: Whole Liver  Biliary Anastomosis: End to End  Arterial Anatomy: Standard    Subjective:     Interval History:  No acute events overnight. Tolerated CRRT. Remains bradycardic but asymptomatic and BP stable    Scheduled Meds:   acyclovir  400 mg Oral BID    ascorbic acid (vitamin C)  1,000 mg Oral Daily    atorvastatin  40 mg Oral Daily    cefTRIAXone (ROCEPHIN) IVPB  2 g Intravenous Q24H    docusate sodium  100 mg Oral Daily    ergocalciferol  50,000 Units Oral Q7 Days    fluconazole  200 mg Oral Daily    heparin (porcine)  5,000 Units Subcutaneous Q8H    insulin aspart U-100  4 Units Subcutaneous TIDWM    insulin detemir U-100  12 Units Subcutaneous Daily    levETIRAcetam  500 mg Oral BID    mycophenolate  1,000 mg Oral BID    pantoprazole  40 mg Oral BID    predniSONE  20 mg Oral Daily    sulfamethoxazole-trimethoprim 400-80mg  1 tablet Oral Daily    tacrolimus  4 mg Oral BID    thiamine  100 mg Oral Daily    triamcinolone acetonide 0.1%   Topical (Top) BID     Continuous Infusions:  PRN Meds:dextrose 50%, dextrose 50%, gelatin adsorbable 100cm top sponge, glucagon (human recombinant), glucose, glucose, insulin aspart U-100, metoclopramide HCl, ondansetron, oxyCODONE, oxyCODONE, sodium chloride 0.9%    Review of Systems   Constitutional: Negative.    HENT: Negative.    Respiratory: Negative.     Cardiovascular: Negative.    Gastrointestinal: Positive for abdominal pain.   Genitourinary: Negative.    Musculoskeletal: Positive for back pain and joint swelling.   Skin: Positive for wound.   Neurological: Negative.    Psychiatric/Behavioral: Negative for confusion.     Objective:     Vital Signs (Most Recent):  Temp: 97.8 °F (36.6 °C) (07/29/18 1500)  Pulse: (!) 58 (07/29/18 1545)  Resp: (!) 28 (07/29/18 1545)  BP: (!) 116/57 (07/29/18 1500)  SpO2: 99 % (07/29/18 1545) Vital Signs (24h Range):  Temp:  [97.6 °F (36.4 °C)-98.5 °F (36.9 °C)] 97.8 °F (36.6 °C)  Pulse:  [44-78] 58  Resp:  [13-33] 28  SpO2:  [97 %-100 %] 99 %  BP: (102-140)/(38-65) 116/57  Arterial Line BP: (110-149)/(31-50) 142/47     Weight: 128.9 kg (284 lb 2.8 oz)  Body mass index is 36.49 kg/m².    Intake/Output - Last 3 Shifts       07/27 0700 - 07/28 0659 07/28 0700 - 07/29 0659 07/29 0700 - 07/30 0659    P.O. 950      I.V. (mL/kg) 5503 (43.7) 2465 (19.1)     Blood  242     NG/ 90     Total Intake(mL/kg) 6633 (52.7) 2797 (21.7)     Urine (mL/kg/hr) 10 (0) 20 (0)     Other 6732 (2.2) 2912 (0.9)     Stool 1000 (0.3)      Total Output 7742 2932      Net -1109 -135             Urine Occurrence  2 x     Stool Occurrence 1 x            Physical Exam   Constitutional: He is oriented to person, place, and time. He appears well-developed and well-nourished.   HENT:   Head: Normocephalic and atraumatic.   Eyes: No scleral icterus.   Cardiovascular: Normal rate and regular rhythm.    No murmur heard.  Pulmonary/Chest: Effort normal and breath sounds normal. He has no wheezes.   Abdominal: Soft. He exhibits no distension.   Abdominal incision c/d/i with staples in place   Musculoskeletal: He exhibits edema.   Lymphadenopathy:     He has no cervical adenopathy.   Neurological: He is alert and oriented to person, place, and time.   Skin: Skin is warm and dry. He is not diaphoretic. There is erythema.   Dry flaking skin throughout        Laboratory:  Immunosuppressants         Stop Route Frequency     tacrolimus capsule 4 mg      -- Oral 2 times daily     mycophenolate capsule 1,000 mg      -- Oral 2 times daily        CBC:   Recent Labs  Lab 07/29/18  0409   WBC 3.58*   RBC 2.20*   HGB 7.1*   HCT 21.2*   PLT 32*   MCV 96   MCH 32.3*   MCHC 33.5     CMP:   Recent Labs  Lab 07/29/18  0409   *  207*  207*   CALCIUM 7.0*  7.0*  7.0*   ALBUMIN 2.4*  2.4*   PROT 3.7*     138  138   K 4.2  4.2  4.2   CO2 21*  21*  21*   *  111*  111*   BUN 17  17  17   CREATININE 1.3  1.3  1.3   ALKPHOS 95   ALT 56*   AST 27   BILITOT 0.7     Coagulation:   Recent Labs  Lab 07/29/18  0409   INR 1.5*   APTT 29.9     Labs within the past 24 hours have been reviewed.    Diagnostic Results:  I have personally reviewed all pertinent imaging studies.    Assessment/Plan:   Alon Adamson is a 63 y.o. male     GI   * Liver transplanted    63 year old male with ESLD 2/2 EtOH and ESRD s/p liver transplant 7/23/18    Neuro:   - Sedation: None  - Pain control: PRN PO Oxycodone  - Continue Keppra for h/o seizures     Pulmonary:   - 2LNC; wean as tolerated  - CXR with small right sided pleural effusion. Monitor  - IS/OOBTC     Cardiac:  - Drips: None, Vasopressin weaned off  - New asymptomatic bradycardia. Junctional bradycardia on EKG  - Atropine at bedside, pacer pads in place  - Cardiology agrees with atropine for any hemodynamic instability, otherwise no intervention required     Renal:   - BUN/CR: 17/1.3  - CRRT per Nephrology, will discuss with team today about HD trial  - Trend BMP     Infectious Disease:   - Afebrile, WBC normal   - Immunosuppression with Tacro, Cellcept, MPN  - Opp infection ppx with Bactrim, Valcyte, Nystatin  - Rocephin x7 days for E. Coli+ Bcx in donor  - Trend WBC     Hematology/Oncology:  - H/H stable  - INR 1.5  - Trend coags  - Trend CBC     Endocrine:  - Insulin gtt     Fluids/Electrolytes/Nutrition/GI:    - Soft puree diet  - Trend CMP  - Replace Lytes PRN      Dispo:  Stable for stepdown to TSU  Primary team: Transplant Surgery            The patients clinical status was discussed at multidisplinary rounds, involving transplant surgery, transplant medicine, pharmacy, nursing, nutrition, and social work.    Rudy Grimm MD  Liver Transplant  [facility]

## 2018-07-29 NOTE — PROGRESS NOTES
[facility]  Nephrology  Progress Note    Patient Name: Alon Adamson  MRN: [id number]  Admission Date: 6/22/2018  Hospital Length of Stay: 37 days  Attending Provider: Williams Moon Jr., MD   Primary Care Physician: Mckinley Vides MD  Principal Problem:Liver transplanted    Subjective:     HPI: Mr. Adamson is 63 yr old male with decompensated alcoholic cirrhosis, CKD III and CAD admitted here on 06/22/18 admitted for bilateral LE hypervolemia and VICKY on CKD III with cr of 2.3 baseline around 1.5. Patient has multiple hospitalization in the past secondary to decompensated liver failure. Patient is currently on transplant team. During current admission patient was getting lasix and albumin intermittently for VICKY however renal function was not getting better. Patient hospital course complicated by hepatic encephalopathy with some improvement of mentation with lactulose. Also treated for cellulitis with 7 days of vancomycin.Nephrology is consulted for worsening/not improving VICKY despite lasix/albumin.     Per chart review patient has no hypotensive episodes, BP mostly above 100's. Has not received nephrotoxins such as NSAIDs/contrast media. No sever sepsis/septic shock or acute blood loss during current admit. UA with 2+ leukocytes and Hyaline casts, no wbc/rbc cast or proteinuria. Urine Na+ < 20.    Patient was transferred to ICU on 7/13/2018 after being more lethargic and hypoactive.  After two days was stepped down back to [facility].       Interval History:   Patient evaluated at bedside, no significant event overnight. Had bladder scan which presented with 90 cc/hr but unknown how accurate since has plenty edema. Was NET even yesterday. No oxygen requirement and denies any shortness of breath.      Review of patient's allergies indicates:   Allergen Reactions    Nsaids (non-steroidal anti-inflammatory drug)      D/t to liver disease.    Tylenol [acetaminophen]      D/t liver disease.     Penicillins Other (See Comments)     Tolerated pip-tazo in 8/2016 without issue  Tolerated cefepime 7/2018 without issue  Since childhood was told not to take it     Current Facility-Administered Medications   Medication Frequency    acyclovir capsule 400 mg BID    ascorbic acid (vitamin C) tablet 1,000 mg Daily    atorvastatin tablet 40 mg Daily    cefTRIAXone (ROCEPHIN) 2 g in dextrose 5 % 50 mL IVPB Q24H    dextrose 50% injection 12.5 g PRN    dextrose 50% injection 25 g PRN    docusate sodium capsule 100 mg Daily    ergocalciferol capsule 50,000 Units Q7 Days    fluconazole tablet 200 mg Daily    furosemide injection 100 mg Once    gelatin adsorbable 100cm top sponge 100 sponge 1 applicator PRN    glucagon (human recombinant) injection 1 mg PRN    glucose chewable tablet 16 g PRN    glucose chewable tablet 24 g PRN    heparin (porcine) injection 5,000 Units Q8H    insulin aspart U-100 pen 0-5 Units QID (AC + HS) PRN    insulin aspart U-100 pen 4 Units TIDWM    insulin detemir U-100 pen 12 Units Daily    levETIRAcetam tablet 500 mg BID    metoclopramide HCl injection 10 mg QID (AC + HS) PRN    mycophenolate capsule 1,000 mg BID    ondansetron injection 4 mg Q8H PRN    oxyCODONE immediate release tablet 10 mg Q6H PRN    oxyCODONE immediate release tablet 5 mg Q6H PRN    pantoprazole EC tablet 40 mg BID    predniSONE tablet 20 mg Daily    sodium chloride 0.9% flush 3 mL PRN    sulfamethoxazole-trimethoprim 400-80mg per tablet 1 tablet Daily    tacrolimus capsule 4 mg BID    thiamine tablet 100 mg Daily    triamcinolone acetonide 0.1% cream BID       Objective:     Vital Signs (Most Recent):  Temp: 98.4 °F (36.9 °C) (07/29/18 0626)  Pulse: (!) 46 (07/29/18 0626)  Resp: 19 (07/29/18 0626)  BP: (!) 123/38 (07/29/18 0600)  SpO2: 100 % (07/29/18 0626)  O2 Device (Oxygen Therapy): room air (07/29/18 0626) Vital Signs (24h Range):  Temp:  [98 °F (36.7 °C)-98.5 °F (36.9 °C)] 98.4 °F (36.9  °C)  Pulse:  [44-78] 46  Resp:  [15-27] 19  SpO2:  [95 %-100 %] 100 %  BP: (104-140)/(38-65) 123/38  Arterial Line BP: (110-156)/(27-48) 125/40     Weight: 128.9 kg (284 lb 2.8 oz) (07/29/18 0400)  Body mass index is 36.49 kg/m².  Body surface area is 2.59 meters squared.    I/O last 3 completed shifts:  In: 5902 [P.O.:180; I.V.:5330; Blood:242; NG/GT:150]  Out: 5964 [Urine:20; Other:5744; Stool:200]    Physical Exam   Constitutional: He is oriented to person, place, and time. He appears well-developed and well-nourished.   HENT:   Head: Normocephalic and atraumatic.   Eyes: No scleral icterus.   Cardiovascular: Normal rate and regular rhythm.    No murmur heard.  Pulmonary/Chest: Effort normal and breath sounds normal. He has no wheezes.   Abdominal: Soft. He exhibits no distension.   Abdominal incision c/d/i with staples in place   Musculoskeletal: He exhibits edema.   Lymphadenopathy:     He has no cervical adenopathy.   Neurological: He is alert and oriented to person, place, and time.   Skin: Skin is warm and dry. He is not diaphoretic. There is erythema.   Dry flaking skin throughout       Significant Labs:  ABGs:   Recent Labs  Lab 07/26/18  0521   PH 7.474*   PCO2 39.3   HCO3 28.9*   POCSATURATED 65*   BE 5     BMP:   Recent Labs  Lab 07/29/18  0409   *  207*  207*   *  111*  111*   CO2 21*  21*  21*   BUN 17  17  17   CREATININE 1.3  1.3  1.3   CALCIUM 7.0*  7.0*  7.0*   MG 2.1     CBC:   Recent Labs  Lab 07/29/18 0409   WBC 3.58*   RBC 2.20*   HGB 7.1*   HCT 21.2*   PLT 32*   MCV 96   MCH 32.3*   MCHC 33.5     CMP:   Recent Labs  Lab 07/29/18  0409   *  207*  207*   CALCIUM 7.0*  7.0*  7.0*   ALBUMIN 2.4*  2.4*   PROT 3.7*     138  138   K 4.2  4.2  4.2   CO2 21*  21*  21*   *  111*  111*   BUN 17  17  17   CREATININE 1.3  1.3  1.3   ALKPHOS 95   ALT 56*   AST 27   BILITOT 0.7     All labs within the past 24 hours have been reviewed.        Assessment/Plan:     Acute kidney injury superimposed on chronic kidney disease    - No longer on pressors   - Had lasix 100 mg in which UOP picked up   - Will continue with Lasix 100 mg q BID   - No need for HD today, will reassess tomorrow if any need for HD trial.   - strict Is & Os, Bladder scans q BID   - Avoid nephrotoxic medications  - Hb > 7gm/dL            Dae Yarbrough  Nephrology  Fellow  [facility]    Pager [phone]

## 2018-07-29 NOTE — PLAN OF CARE
Reviewed insulin regimen and CBG.    Only received sliding scale insulin yesterday, presumed due to not eating.    Start levemir 12 units daily - first now  Continue novolog 4 qAC  POC AC/HS  Low dose correction     Will continue to follow along, please call with any questions.

## 2018-07-29 NOTE — ASSESSMENT & PLAN NOTE
- Will hold SLED for today since CVP on lower end 1-2   - Currently of pressors which he has tolerated well  - Continue to be anuric (UOP= 35 cc in morning). Will give trial of lasix IV today   - Will probably anticipate if patient continues to be anuric will put back on SLED   - continue to monitor serial RFPs and strict Is & Os, Bladder scans q BID   - Avoid nephrotoxic medications  - Hb > 7gm/dL

## 2018-07-29 NOTE — SUBJECTIVE & OBJECTIVE
Interval History:   Patient evaluated at bedside, no significant event overnight. Had bladder scan which presented with 90 cc/hr but unknown how accurate since has plenty edema. Was NET even yesterday. No oxygen requirement and denies any shortness of breath.      Review of patient's allergies indicates:   Allergen Reactions    Nsaids (non-steroidal anti-inflammatory drug)      D/t to liver disease.    Tylenol [acetaminophen]      D/t liver disease.    Penicillins Other (See Comments)     Tolerated pip-tazo in 8/2016 without issue  Tolerated cefepime 7/2018 without issue  Since childhood was told not to take it     Current Facility-Administered Medications   Medication Frequency    acyclovir capsule 400 mg BID    ascorbic acid (vitamin C) tablet 1,000 mg Daily    atorvastatin tablet 40 mg Daily    cefTRIAXone (ROCEPHIN) 2 g in dextrose 5 % 50 mL IVPB Q24H    dextrose 50% injection 12.5 g PRN    dextrose 50% injection 25 g PRN    docusate sodium capsule 100 mg Daily    ergocalciferol capsule 50,000 Units Q7 Days    fluconazole tablet 200 mg Daily    furosemide injection 100 mg Once    gelatin adsorbable 100cm top sponge 100 sponge 1 applicator PRN    glucagon (human recombinant) injection 1 mg PRN    glucose chewable tablet 16 g PRN    glucose chewable tablet 24 g PRN    heparin (porcine) injection 5,000 Units Q8H    insulin aspart U-100 pen 0-5 Units QID (AC + HS) PRN    insulin aspart U-100 pen 4 Units TIDWM    insulin detemir U-100 pen 12 Units Daily    levETIRAcetam tablet 500 mg BID    metoclopramide HCl injection 10 mg QID (AC + HS) PRN    mycophenolate capsule 1,000 mg BID    ondansetron injection 4 mg Q8H PRN    oxyCODONE immediate release tablet 10 mg Q6H PRN    oxyCODONE immediate release tablet 5 mg Q6H PRN    pantoprazole EC tablet 40 mg BID    predniSONE tablet 20 mg Daily    sodium chloride 0.9% flush 3 mL PRN    sulfamethoxazole-trimethoprim 400-80mg per tablet 1 tablet  Daily    tacrolimus capsule 4 mg BID    thiamine tablet 100 mg Daily    triamcinolone acetonide 0.1% cream BID       Objective:     Vital Signs (Most Recent):  Temp: 98.4 °F (36.9 °C) (07/29/18 0626)  Pulse: (!) 46 (07/29/18 0626)  Resp: 19 (07/29/18 0626)  BP: (!) 123/38 (07/29/18 0600)  SpO2: 100 % (07/29/18 0626)  O2 Device (Oxygen Therapy): room air (07/29/18 0626) Vital Signs (24h Range):  Temp:  [98 °F (36.7 °C)-98.5 °F (36.9 °C)] 98.4 °F (36.9 °C)  Pulse:  [44-78] 46  Resp:  [15-27] 19  SpO2:  [95 %-100 %] 100 %  BP: (104-140)/(38-65) 123/38  Arterial Line BP: (110-156)/(27-48) 125/40     Weight: 128.9 kg (284 lb 2.8 oz) (07/29/18 0400)  Body mass index is 36.49 kg/m².  Body surface area is 2.59 meters squared.    I/O last 3 completed shifts:  In: 5902 [P.O.:180; I.V.:5330; Blood:242; NG/GT:150]  Out: 5964 [Urine:20; Other:5744; Stool:200]    Physical Exam   Constitutional: He is oriented to person, place, and time. He appears well-developed and well-nourished.   HENT:   Head: Normocephalic and atraumatic.   Eyes: No scleral icterus.   Cardiovascular: Normal rate and regular rhythm.    No murmur heard.  Pulmonary/Chest: Effort normal and breath sounds normal. He has no wheezes.   Abdominal: Soft. He exhibits no distension.   Abdominal incision c/d/i with staples in place   Musculoskeletal: He exhibits edema.   Lymphadenopathy:     He has no cervical adenopathy.   Neurological: He is alert and oriented to person, place, and time.   Skin: Skin is warm and dry. He is not diaphoretic. There is erythema.   Dry flaking skin throughout       Significant Labs:  ABGs:   Recent Labs  Lab 07/26/18  0521   PH 7.474*   PCO2 39.3   HCO3 28.9*   POCSATURATED 65*   BE 5     BMP:   Recent Labs  Lab 07/29/18  0409   *  207*  207*   *  111*  111*   CO2 21*  21*  21*   BUN 17  17  17   CREATININE 1.3  1.3  1.3   CALCIUM 7.0*  7.0*  7.0*   MG 2.1     CBC:   Recent Labs  Lab 07/29/18  0409   WBC 3.58*    RBC 2.20*   HGB 7.1*   HCT 21.2*   PLT 32*   MCV 96   MCH 32.3*   MCHC 33.5     CMP:   Recent Labs  Lab 07/29/18  0409   *  207*  207*   CALCIUM 7.0*  7.0*  7.0*   ALBUMIN 2.4*  2.4*   PROT 3.7*     138  138   K 4.2  4.2  4.2   CO2 21*  21*  21*   *  111*  111*   BUN 17  17  17   CREATININE 1.3  1.3  1.3   ALKPHOS 95   ALT 56*   AST 27   BILITOT 0.7     All labs within the past 24 hours have been reviewed.

## 2018-07-29 NOTE — ASSESSMENT & PLAN NOTE
63 yr old male with decompensated alcoholic cirrhosis, CKD III and CAD. Hospitalized with HE, VICKY on CKD. S/p OLT on 7/23 with intraop CRRT. Brought to the ICU intubated, not requiring pressor support. Significant blood loss, with aggressive resuscitation intraop    Neuro  -hx seizures and HE  -keppra  -AAOX4    Cardiovascular  - cards consulted for junctional bradycardia; determined it was sinus bradycardia 2/2 electrolyte derangement and recommended keeping atropine at bedside in case it becomes symptomatic  - h/o CAD  - 2D Echo 7/9 normal EF (55-60%), trivial tricuspid/mitral regurg  - vasopressin weaned off 7/25, restarted 7/27 for hypotension during CRRT, radial A-line placed.  - bradycardia since 7/27 after starting vaso, possible baroreceptor reflex  -vaso turned off again 7/28, continues to have asymptomatic bradycardia in the 40s.    Respiratory:  - extubated 7/25  - no acute issues    Renal:  -VICKY on CKD-intraop CRRT, anuric  -Nephrology following, on CRRT  -[place], BUN/Cr    Liver and Pancreas  -s/p OLT 7/23   -Trend liver enzymes, enzymes and Tbili improving, but PT/INR worse than yesterday  -PLTs low.  Transfuse per Transplant Surgery    GI  -Clear liquids  -Replete lytes prn       ID  -Anti-rejection, antiretrovirals as per primary  -monitor WBC    Heme  -h/h stable, thrombocytopenia. 14 L blood loss intraop.   RBC (Units) 9   FFP (Units) 2   Cryoprecipitate (Units)  2   Platelets (Units) 2   -PLT transfusion per Transplant Surgery  -some heme output in OG tube, continue to monitor CBC and output  -Hep 5k DVT ppx    Dispo  -Primary team Transplant Surgery  -Monitor platelets, any bleeding, and other LFTs  -Wean vasopressin and CRRT  -Continue SICU care

## 2018-07-29 NOTE — SUBJECTIVE & OBJECTIVE
Interval History:   Patient evaluated at bedside, pressors were placed on hold this morning in which he has tolerated well. CVP has been on lower end 1-2. Was on SLED this morning which he didn't present with no clotting issues. Total intake yesterday 6.6 L and output  7.7 L. For NET negative 1.1 L.     Review of patient's allergies indicates:   Allergen Reactions    Nsaids (non-steroidal anti-inflammatory drug)      D/t to liver disease.    Tylenol [acetaminophen]      D/t liver disease.    Penicillins Other (See Comments)     Tolerated pip-tazo in 8/2016 without issue  Tolerated cefepime 7/2018 without issue  Since childhood was told not to take it     Current Facility-Administered Medications   Medication Frequency    0.9%  NaCl infusion (for blood administration) Q24H PRN    acyclovir capsule 400 mg BID    ascorbic acid (vitamin C) tablet 1,000 mg Daily    atorvastatin tablet 40 mg Daily    cefTRIAXone (ROCEPHIN) 2 g in dextrose 5 % 50 mL IVPB Q24H    dextrose 50% injection 12.5 g PRN    dextrose 50% injection 25 g PRN    diphenhydrAMINE-zinc acetate 1-0.1% cream TID PRN    docusate sodium capsule 100 mg Daily    ergocalciferol capsule 50,000 Units Q7 Days    fluconazole tablet 200 mg Daily    gelatin adsorbable 100cm top sponge 100 sponge 1 applicator PRN    glucagon (human recombinant) injection 1 mg PRN    glucose chewable tablet 16 g PRN    glucose chewable tablet 24 g PRN    heparin (porcine) injection 5,000 Units Q8H    insulin aspart U-100 pen 0-5 Units QID (AC + HS) PRN    insulin aspart U-100 pen 4 Units TIDWM    levETIRAcetam in NaCl (iso-os) IVPB 500 mg Q12H    magnesium sulfate 2g in water 50mL IVPB (premix) PRN    methylPREDNISolone sodium succinate injection 20 mg Q12H    Followed by    [START ON 7/29/2018] predniSONE tablet 20 mg Daily    metoclopramide HCl injection 10 mg QID (AC + HS) PRN    mycophenolate mofetil 200 mg/mL suspension 1,000 mg BID    ondansetron  injection 4 mg Q8H PRN    oxyCODONE immediate release tablet 10 mg Q6H PRN    oxyCODONE immediate release tablet 5 mg Q6H PRN    pantoprazole 40 mg in dextrose 5 % 100 mL IVPB (over 15 minutes) (ready to mix system) BID    sodium chloride 0.9% flush 3 mL PRN    sodium phosphate 30 mmol in dextrose 5 % 250 mL IVPB PRN    sulfamethoxazole-trimethoprim 400-80mg per tablet 1 tablet Daily    tacrolimus (PROGRAF) 1 mg/mL oral syringe BID    thiamine tablet 100 mg Daily    triamcinolone acetonide 0.1% cream BID    vasopressin (PITRESSIN) 0.2 Units/mL in dextrose 5 % 100 mL infusion Continuous    zinc sulfate capsule 220 mg BID       Objective:     Vital Signs (Most Recent):  Temp: 98.5 °F (36.9 °C) (07/28/18 1900)  Pulse: (!) 47 (07/28/18 1921)  Resp: 16 (07/28/18 1921)  BP: (!) 140/62 (07/28/18 1900)  SpO2: 98 % (07/28/18 1921)  O2 Device (Oxygen Therapy): room air (07/28/18 1921) Vital Signs (24h Range):  Temp:  [98 °F (36.7 °C)-98.5 °F (36.9 °C)] 98.5 °F (36.9 °C)  Pulse:  [32-78] 47  Resp:  [13-30] 16  SpO2:  [95 %-100 %] 98 %  BP: (108-140)/(51-63) 140/62  Arterial Line BP: (117-156)/(27-46) 136/38     Weight: 125.8 kg (277 lb 5.4 oz) (07/28/18 0500)  Body mass index is 35.61 kg/m².  Body surface area is 2.56 meters squared.    I/O last 3 completed shifts:  In: 9141 [P.O.:950; I.V.:7679; Blood:242; NG/GT:270]  Out: 20754 [Urine:30; Other:9644; Stool:1000]    Physical Exam   Constitutional: He is oriented to person, place, and time. He appears well-developed and well-nourished.   HENT:   Head: Normocephalic and atraumatic.   Eyes: No scleral icterus.   Cardiovascular: Normal rate and regular rhythm.    No murmur heard.  Pulmonary/Chest: Effort normal and breath sounds normal. He has no wheezes.   Abdominal: Soft. He exhibits no distension.   Abdominal incision c/d/i with staples in place   Musculoskeletal: He exhibits edema.   Lymphadenopathy:     He has no cervical adenopathy.   Neurological: He is alert  and oriented to person, place, and time.   Skin: Skin is warm and dry. He is not diaphoretic. There is erythema.   Dry flaking skin throughout       Significant Labs:  ABGs:   Recent Labs  Lab 07/26/18  0521   PH 7.474*   PCO2 39.3   HCO3 28.9*   POCSATURATED 65*   BE 5     BMP:   Recent Labs  Lab 07/28/18  1640 07/28/18 1916   * 287*   * 112*   CO2 20* 21*   BUN 12 14   CREATININE 0.8 1.0   CALCIUM 7.0* 7.1*   MG 1.8  --      CBC:   Recent Labs  Lab 07/28/18  0400   WBC 3.14*   RBC 2.15*   HGB 7.0*   HCT 21.5*   PLT 33*   *   MCH 32.6*   MCHC 32.6     CMP:   Recent Labs  Lab 07/28/18  0400  07/28/18  1640 07/28/18 1916   *  221*  221*  < > 273* 287*   CALCIUM 7.0*  7.0*  7.0*  < > 7.0* 7.1*   ALBUMIN 2.2*  2.2*  --  2.5*  --    PROT 3.8*  --   --   --      139  139  < > 137 139   K 4.3  4.3  4.3  < > 4.2 4.3   CO2 24  24  24  < > 20* 21*     110  110  < > 112* 112*   BUN 9  9  9  < > 12 14   CREATININE 0.7  0.7  0.7  < > 0.8 1.0   ALKPHOS 111  --   --   --    ALT 67*  --   --   --    AST 41*  --   --   --    BILITOT 0.8  --   --   --    < > = values in this interval not displayed.  All labs within the past 24 hours have been reviewed.

## 2018-07-29 NOTE — PROGRESS NOTES
[facility]  Critical Care - Surgery  Progress Note    Patient Name: Alon Adamson  MRN: [id number]  Admission Date: 6/22/2018  Hospital Length of Stay: 37 days  Code Status: Full Code  Attending Provider: Williams Moon Jr., MD  Primary Care Provider: Mckinley Vides MD   Principal Problem: Liver transplanted    Subjective:     Hospital/ICU Course:  Per tranplant, Patient is listed for liver transplant, placed on internal hold 6/25 for HE then status 7 on 6/26 due to change in prescription coverage. He was re-activated on the list 7/3, MELD 22. Pt started on IV diuretics 6/22 and continued 6/23 and 6/24.  BLE edema and ascites sign if improved with diuresis and kidney function also improved. During admission, patient had worsening encephalopathy and asterixis prompting infectious work up, all blood and urine cultures obtained during admission with NGTD. Intermittently, encephalopathy severe enough to warrant lactulose enemas. CT head obtained and negative. Paracentesis 6/25 negative for peritonitis per cell count and U/A negative. Pt also with concern for BLE cellulitis- upper legs bright red with lower BLE still with dark appearance of venous stasis. Placed on vancomycin with improvement in BLE redness 6/26. Vanc continued for 7 days (ended 7/1). His HE waxed and waned for several days requiring lactulose enemas. Pt eating minimally during period of encephalopathy requiring gentle hydration with IVF 6/26. Of note, taco placement attempted 6/26 but unable secondary to agitation upon insertion prompting self resolving nose bleed. Micro lab called 6/26 PM with blood cx + for G+C in blood- pt continued on vanc which was started 6/25. ID consulted, suspect + blood cultures contaminant. EEG obtained 6/26 as pt with h/o serizures and negative. Pt continued on home Keppra. VICKY on admission initially improved with diuresis, but Cr sakina intermittently during admission likely related to aggressive  diuresis. Of note, with chest pain overnight 6/26-6/27 that resolved without intervention and pt reported as mild.  EKG NSR with PVC, troponin 0.1 in setting of hypervolemia + VICKY.  Normal dobutamine stress 1/2018.  Cardiology consulted and felt not ACS, no need for further workup other than continue to treat current risk factors for CAD with ASA (pt already on ASA and statin as outpt) + BB for portal hypertension which was started. Repeat paracentesis 7/10, 4.8L off, no SBP. Hypernatremia noted 7/9, nephrology consulted to aid in management. Hypernatremia attributed to dehydration from frequent BM's, diuretics were held, d/c'd creon due to excessive diarrhea, and patient was treated with increasing po free water and IVF. Echo 7/9 with normal EF, no wall abnormality, mild tricuspid/mitral regurg.     Overnight from 7/12 to 7/13, pt became hypothermic to 91.2 corrected with warming blankets, experienced worsening encephalopathy, hypotensive to 80/40.  Transferred to SICU.  Corrected with albumin 5% bolus.  Given additional lactulose.    7/14- no acute issues, ammonia level decreased, mentation improving    7/23-OLT  7/24 - unable to tolerate full CRRT due to hypotension, on vasopressin 0.04, unable to wean vent due to mental status, pulm function good, low PLT being monitored, no acute bleed.  AST/ALT improving.  7/25 - extubated, vasopressin weaned off, received CRRT, 1U PLT  7/26 - no acute events, mental status much better  7/27 - CRRT paused for hypotension, needed to be restarted on vaso 0.04, asymptomatic bradycardia  7/28 - CRRT held, 1U PRBC transfused    Interval History/Significant Events: NAEON, AFVSS, A&Ox4, 1U PLT ordered for this morning.  Vaso off.    Follow-up For: Procedure(s) (LRB):  TRANSPLANT, LIVER (N/A)    Post-Operative Day: 6 Days Post-Op    Objective:     Vital Signs (Most Recent):  Temp: 98.4 °F (36.9 °C) (07/29/18 0626)  Pulse: (!) 46 (07/29/18 0626)  Resp: 19 (07/29/18 0626)  BP: (!)  123/38 (07/29/18 0600)  SpO2: 100 % (07/29/18 0626) Vital Signs (24h Range):  Temp:  [98 °F (36.7 °C)-98.5 °F (36.9 °C)] 98.4 °F (36.9 °C)  Pulse:  [32-78] 46  Resp:  [14-27] 19  SpO2:  [95 %-100 %] 100 %  BP: (104-140)/(38-65) 123/38  Arterial Line BP: (110-156)/(27-48) 125/40     Weight: 128.9 kg (284 lb 2.8 oz)  Body mass index is 36.49 kg/m².      Intake/Output Summary (Last 24 hours) at 07/29/18 0737  Last data filed at 07/29/18 0600   Gross per 24 hour   Intake             2797 ml   Output             2932 ml   Net             -135 ml       Physical Exam   Constitutional: He is oriented to person, place, and time.   Appears severely ill, bitemporal wasting   HENT:   Head: Normocephalic.   Right Ear: External ear normal.   Left Ear: External ear normal.   Eyes: EOM are normal. Pupils are equal, round, and reactive to light. Right eye exhibits no discharge. Left eye exhibits no discharge.   Cardiovascular: Regular rhythm and normal heart sounds.  Exam reveals no gallop and no friction rub.    No murmur heard.  Sinus bradycardia   Pulmonary/Chest: Effort normal and breath sounds normal. No respiratory distress. He has no wheezes. He has no rales. He exhibits no tenderness.   Abdominal: Soft. He exhibits no distension and no mass. There is tenderness. There is no rebound and no guarding. No hernia.   Chevron scar from OLT c/d/i and dressing over former drain site in RLQ; appropriately tender   Genitourinary:   Genitourinary Comments: Severe scrotal edema   Musculoskeletal: He exhibits edema.   Neurological: He is alert and oriented to person, place, and time.   Skin: Skin is warm and dry. No erythema.   Nursing note and vitals reviewed.      Vents:  Vent Mode: Spont (07/25/18 0929)  Set Rate: 16 bmp (07/25/18 0700)  Vt Set: 500 mL (07/23/18 1446)  Pressure Support: 10 cmH20 (07/25/18 0929)  PEEP/CPAP: 5 cmH20 (07/25/18 0929)  Oxygen Concentration (%): 28 (07/27/18 0445)  Peak Airway Pressure: 16 cmH2O (07/25/18  0929)  Plateau Pressure: 12 cmH20 (07/25/18 0929)  Total Ve: 14.1 mL (07/25/18 0929)  Negative Inspiratory Force (cm H2O): -21 (07/25/18 1020)  F/VT Ratio<105 (RSBI): (!) 24.86 (07/25/18 0700)    Lines/Drains/Airways     Central Venous Catheter Line                 Introducer 07/23/18 0500 right internal jugular 6 days         Percutaneous Central Line Insertion/Assessment - double lumen  07/23/18 0500 right internal jugular 6 days         Percutaneous Central Line Insertion/Assessment - triple lumen  07/23/18 0530 right internal jugular 6 days          Drain                 Urethral Catheter 07/21/18 1706 7 days         Rectal Tube 07/27/18 0930 rectal tube w/ balloon (indicate number of mLs) 1 day          Arterial Line                 Arterial Line 07/26/18 1500 Right Radial 2 days          Peripheral Intravenous Line                 Midline Catheter Insertion/Assessment  - Single Lumen 07/20/18 1128 Right cephalic vein (lateral side of arm) 18g x 10cm 8 days                Significant Labs:    CBC/Anemia Profile:    Recent Labs  Lab 07/28/18  0400 07/29/18  0409   WBC 3.14* 3.58*   HGB 7.0* 7.1*   HCT 21.5* 21.2*   PLT 33* 32*   * 96   RDW 16.9* 17.2*        Chemistries:    Recent Labs  Lab 07/28/18  0400  07/28/18  1640 07/28/18  1916 07/29/18  0409     139  139  < > 137 139 138  138  138   K 4.3  4.3  4.3  < > 4.2 4.3 4.2  4.2  4.2     110  110  < > 112* 112* 111*  111*  111*   CO2 24  24  24  < > 20* 21* 21*  21*  21*   BUN 9  9  9  < > 12 14 17  17  17   CREATININE 0.7  0.7  0.7  < > 0.8 1.0 1.3  1.3  1.3   CALCIUM 7.0*  7.0*  7.0*  < > 7.0* 7.1* 7.0*  7.0*  7.0*   ALBUMIN 2.2*  2.2*  --  2.5*  --  2.4*  2.4*   PROT 3.8*  --   --   --  3.7*   BILITOT 0.8  --   --   --  0.7   ALKPHOS 111  --   --   --  95   ALT 67*  --   --   --  56*   AST 41*  --   --   --  27   MG 2.1  --  1.8  --  2.1   PHOS 3.4  3.4  --  2.2*  --  3.5  3.5   < > = values in this interval  not displayed.    All pertinent labs within the past 24 hours have been reviewed.    Significant Imaging:  I have reviewed all pertinent imaging results/findings within the past 24 hours.    Assessment/Plan:     Hepatic encephalopathy    63 yr old male with decompensated alcoholic cirrhosis, CKD III and CAD. Hospitalized with HE, VICKY on CKD. S/p OLT on 7/23 with intraop CRRT. Brought to the ICU intubated, not requiring pressor support. Significant blood loss, with aggressive resuscitation intraop    Neuro  -hx seizures and HE  -keppra  -AAOX4    Cardiovascular  - cards consulted for junctional bradycardia; determined it was sinus bradycardia 2/2 electrolyte derangement and recommended keeping atropine at bedside in case it becomes symptomatic  - h/o CAD  - 2D Echo 7/9 normal EF (55-60%), trivial tricuspid/mitral regurg  - vasopressin weaned off 7/25, restarted 7/27 for hypotension during CRRT, radial A-line placed.  - bradycardia since 7/27 after starting vaso, possible baroreceptor reflex  -vaso turned off again 7/28, continues to have asymptomatic bradycardia in the 40s.    Respiratory:  - extubated 7/25  - no acute issues    Renal:  -VICKY on CKD-intraop CRRT, anuric  -Nephrology following, on CRRT  -[place], BUN/Cr    Liver and Pancreas  -s/p OLT 7/23   -Trend liver enzymes, enzymes and Tbili improving, but PT/INR worse than yesterday  -PLTs low.  Transfuse per Transplant Surgery    GI  -Clear liquids  -Replete lytes prn       ID  -Anti-rejection, antiretrovirals as per primary  -monitor WBC    Heme  -h/h stable, thrombocytopenia. 14 L blood loss intraop.   RBC (Units) 9   FFP (Units) 2   Cryoprecipitate (Units)  2   Platelets (Units) 2   -PLT transfusion per Transplant Surgery  -some heme output in OG tube, continue to monitor CBC and output  -Hep 5k DVT ppx    Dispo  -Primary team Transplant Surgery  -Monitor platelets, any bleeding, and other LFTs  -Wean vasopressin and CRRT  -Continue SICU care            Critical care was time spent personally by me on the following activities: development of treatment plan with patient or surrogate and bedside caregivers, discussions with consultants, evaluation of patient's response to treatment, examination of patient, ordering and performing treatments and interventions, ordering and review of laboratory studies, ordering and review of radiographic studies, pulse oximetry, re-evaluation of patient's condition.  This critical care time did not overlap with that of any other provider or involve time for any procedures.     Ernesto Fraire MD  Critical Care - Surgery  [facility]

## 2018-07-29 NOTE — SUBJECTIVE & OBJECTIVE
Interval History:  No acute events overnight. Tolerated CRRT. Remains bradycardic but asymptomatic and BP stable    Scheduled Meds:   acyclovir  400 mg Oral BID    ascorbic acid (vitamin C)  1,000 mg Oral Daily    atorvastatin  40 mg Oral Daily    cefTRIAXone (ROCEPHIN) IVPB  2 g Intravenous Q24H    docusate sodium  100 mg Oral Daily    ergocalciferol  50,000 Units Oral Q7 Days    fluconazole  200 mg Oral Daily    heparin (porcine)  5,000 Units Subcutaneous Q8H    insulin aspart U-100  4 Units Subcutaneous TIDWM    insulin detemir U-100  12 Units Subcutaneous Daily    levETIRAcetam  500 mg Oral BID    mycophenolate  1,000 mg Oral BID    pantoprazole  40 mg Oral BID    predniSONE  20 mg Oral Daily    sulfamethoxazole-trimethoprim 400-80mg  1 tablet Oral Daily    tacrolimus  4 mg Oral BID    thiamine  100 mg Oral Daily    triamcinolone acetonide 0.1%   Topical (Top) BID     Continuous Infusions:  PRN Meds:dextrose 50%, dextrose 50%, gelatin adsorbable 100cm top sponge, glucagon (human recombinant), glucose, glucose, insulin aspart U-100, metoclopramide HCl, ondansetron, oxyCODONE, oxyCODONE, sodium chloride 0.9%    Review of Systems   Constitutional: Negative.    HENT: Negative.    Respiratory: Negative.    Cardiovascular: Negative.    Gastrointestinal: Positive for abdominal pain.   Genitourinary: Negative.    Musculoskeletal: Positive for back pain and joint swelling.   Skin: Positive for wound.   Neurological: Negative.    Psychiatric/Behavioral: Negative for confusion.     Objective:     Vital Signs (Most Recent):  Temp: 97.8 °F (36.6 °C) (07/29/18 1500)  Pulse: (!) 58 (07/29/18 1545)  Resp: (!) 28 (07/29/18 1545)  BP: (!) 116/57 (07/29/18 1500)  SpO2: 99 % (07/29/18 1545) Vital Signs (24h Range):  Temp:  [97.6 °F (36.4 °C)-98.5 °F (36.9 °C)] 97.8 °F (36.6 °C)  Pulse:  [44-78] 58  Resp:  [13-33] 28  SpO2:  [97 %-100 %] 99 %  BP: (102-140)/(38-65) 116/57  Arterial Line BP: (110-149)/(31-50) 142/47      Weight: 128.9 kg (284 lb 2.8 oz)  Body mass index is 36.49 kg/m².    Intake/Output - Last 3 Shifts       07/27 0700 - 07/28 0659 07/28 0700 - 07/29 0659 07/29 0700 - 07/30 0659    P.O. 950      I.V. (mL/kg) 5503 (43.7) 2465 (19.1)     Blood  242     NG/ 90     Total Intake(mL/kg) 6633 (52.7) 2797 (21.7)     Urine (mL/kg/hr) 10 (0) 20 (0)     Other 6732 (2.2) 2912 (0.9)     Stool 1000 (0.3)      Total Output 7742 2932      Net -1109 -135             Urine Occurrence  2 x     Stool Occurrence 1 x            Physical Exam   Constitutional: He is oriented to person, place, and time. He appears well-developed and well-nourished.   HENT:   Head: Normocephalic and atraumatic.   Eyes: No scleral icterus.   Cardiovascular: Normal rate and regular rhythm.    No murmur heard.  Pulmonary/Chest: Effort normal and breath sounds normal. He has no wheezes.   Abdominal: Soft. He exhibits no distension.   Abdominal incision c/d/i with staples in place   Musculoskeletal: He exhibits edema.   Lymphadenopathy:     He has no cervical adenopathy.   Neurological: He is alert and oriented to person, place, and time.   Skin: Skin is warm and dry. He is not diaphoretic. There is erythema.   Dry flaking skin throughout       Laboratory:  Immunosuppressants         Stop Route Frequency     tacrolimus capsule 4 mg      -- Oral 2 times daily     mycophenolate capsule 1,000 mg      -- Oral 2 times daily        CBC:   Recent Labs  Lab 07/29/18  0409   WBC 3.58*   RBC 2.20*   HGB 7.1*   HCT 21.2*   PLT 32*   MCV 96   MCH 32.3*   MCHC 33.5     CMP:   Recent Labs  Lab 07/29/18  0409   *  207*  207*   CALCIUM 7.0*  7.0*  7.0*   ALBUMIN 2.4*  2.4*   PROT 3.7*     138  138   K 4.2  4.2  4.2   CO2 21*  21*  21*   *  111*  111*   BUN 17  17  17   CREATININE 1.3  1.3  1.3   ALKPHOS 95   ALT 56*   AST 27   BILITOT 0.7     Coagulation:   Recent Labs  Lab 07/29/18  0409   INR 1.5*   APTT 29.9     Labs within the  past 24 hours have been reviewed.    Diagnostic Results:  I have personally reviewed all pertinent imaging studies.

## 2018-07-29 NOTE — PROGRESS NOTES
[facility]  Nephrology  Progress Note    Patient Name: Alon Adamson  MRN: [id number]  Admission Date: 6/22/2018  Hospital Length of Stay: 36 days  Attending Provider: Williams Moon Jr., MD   Primary Care Physician: Mckinley Vides MD  Principal Problem:Liver transplanted    Subjective:     HPI: Mr. Adamson is 63 yr old male with decompensated alcoholic cirrhosis, CKD III and CAD admitted here on 06/22/18 admitted for bilateral LE hypervolemia and VICKY on CKD III with cr of 2.3 baseline around 1.5. Patient has multiple hospitalization in the past secondary to decompensated liver failure. Patient is currently on transplant team. During current admission patient was getting lasix and albumin intermittently for VICKY however renal function was not getting better. Patient hospital course complicated by hepatic encephalopathy with some improvement of mentation with lactulose. Also treated for cellulitis with 7 days of vancomycin.Nephrology is consulted for worsening/not improving VICKY despite lasix/albumin.     Per chart review patient has no hypotensive episodes, BP mostly above 100's. Has not received nephrotoxins such as NSAIDs/contrast media. No sever sepsis/septic shock or acute blood loss during current admit. UA with 2+ leukocytes and Hyaline casts, no wbc/rbc cast or proteinuria. Urine Na+ < 20.    Patient was transferred to ICU on 7/13/2018 after being more lethargic and hypoactive.  After two days was stepped down back to [facility]plant [place].       Interval History:   Patient evaluated at bedside, pressors were placed on hold this morning in which he has tolerated well. CVP has been on lower end 1-2. Was on SLED this morning which he didn't present with no clotting issues. Total intake yesterday 6.6 L and output  7.7 L. For NET negative 1.1 L.     Review of patient's allergies indicates:   Allergen Reactions    Nsaids (non-steroidal anti-inflammatory drug)      D/t to liver disease.    Tylenol  [acetaminophen]      D/t liver disease.    Penicillins Other (See Comments)     Tolerated pip-tazo in 8/2016 without issue  Tolerated cefepime 7/2018 without issue  Since childhood was told not to take it     Current Facility-Administered Medications   Medication Frequency    0.9%  NaCl infusion (for blood administration) Q24H PRN    acyclovir capsule 400 mg BID    ascorbic acid (vitamin C) tablet 1,000 mg Daily    atorvastatin tablet 40 mg Daily    cefTRIAXone (ROCEPHIN) 2 g in dextrose 5 % 50 mL IVPB Q24H    dextrose 50% injection 12.5 g PRN    dextrose 50% injection 25 g PRN    diphenhydrAMINE-zinc acetate 1-0.1% cream TID PRN    docusate sodium capsule 100 mg Daily    ergocalciferol capsule 50,000 Units Q7 Days    fluconazole tablet 200 mg Daily    gelatin adsorbable 100cm top sponge 100 sponge 1 applicator PRN    glucagon (human recombinant) injection 1 mg PRN    glucose chewable tablet 16 g PRN    glucose chewable tablet 24 g PRN    heparin (porcine) injection 5,000 Units Q8H    insulin aspart U-100 pen 0-5 Units QID (AC + HS) PRN    insulin aspart U-100 pen 4 Units TIDWM    levETIRAcetam in NaCl (iso-os) IVPB 500 mg Q12H    magnesium sulfate 2g in water 50mL IVPB (premix) PRN    methylPREDNISolone sodium succinate injection 20 mg Q12H    Followed by    [START ON 7/29/2018] predniSONE tablet 20 mg Daily    metoclopramide HCl injection 10 mg QID (AC + HS) PRN    mycophenolate mofetil 200 mg/mL suspension 1,000 mg BID    ondansetron injection 4 mg Q8H PRN    oxyCODONE immediate release tablet 10 mg Q6H PRN    oxyCODONE immediate release tablet 5 mg Q6H PRN    pantoprazole 40 mg in dextrose 5 % 100 mL IVPB (over 15 minutes) (ready to mix system) BID    sodium chloride 0.9% flush 3 mL PRN    sodium phosphate 30 mmol in dextrose 5 % 250 mL IVPB PRN    sulfamethoxazole-trimethoprim 400-80mg per tablet 1 tablet Daily    tacrolimus (PROGRAF) 1 mg/mL oral syringe BID    thiamine tablet  100 mg Daily    triamcinolone acetonide 0.1% cream BID    vasopressin (PITRESSIN) 0.2 Units/mL in dextrose 5 % 100 mL infusion Continuous    zinc sulfate capsule 220 mg BID       Objective:     Vital Signs (Most Recent):  Temp: 98.5 °F (36.9 °C) (07/28/18 1900)  Pulse: (!) 47 (07/28/18 1921)  Resp: 16 (07/28/18 1921)  BP: (!) 140/62 (07/28/18 1900)  SpO2: 98 % (07/28/18 1921)  O2 Device (Oxygen Therapy): room air (07/28/18 1921) Vital Signs (24h Range):  Temp:  [98 °F (36.7 °C)-98.5 °F (36.9 °C)] 98.5 °F (36.9 °C)  Pulse:  [32-78] 47  Resp:  [13-30] 16  SpO2:  [95 %-100 %] 98 %  BP: (108-140)/(51-63) 140/62  Arterial Line BP: (117-156)/(27-46) 136/38     Weight: 125.8 kg (277 lb 5.4 oz) (07/28/18 0500)  Body mass index is 35.61 kg/m².  Body surface area is 2.56 meters squared.    I/O last 3 completed shifts:  In: 9141 [P.O.:950; I.V.:7679; Blood:242; NG/GT:270]  Out: 16738 [Urine:30; Other:9644; Stool:1000]    Physical Exam   Constitutional: He is oriented to person, place, and time. He appears well-developed and well-nourished.   HENT:   Head: Normocephalic and atraumatic.   Eyes: No scleral icterus.   Cardiovascular: Normal rate and regular rhythm.    No murmur heard.  Pulmonary/Chest: Effort normal and breath sounds normal. He has no wheezes.   Abdominal: Soft. He exhibits no distension.   Abdominal incision c/d/i with staples in place   Musculoskeletal: He exhibits edema.   Lymphadenopathy:     He has no cervical adenopathy.   Neurological: He is alert and oriented to person, place, and time.   Skin: Skin is warm and dry. He is not diaphoretic. There is erythema.   Dry flaking skin throughout       Significant Labs:  ABGs:   Recent Labs  Lab 07/26/18  0521   PH 7.474*   PCO2 39.3   HCO3 28.9*   POCSATURATED 65*   BE 5     BMP:   Recent Labs  Lab 07/28/18  1640 07/28/18  1916   * 287*   * 112*   CO2 20* 21*   BUN 12 14   CREATININE 0.8 1.0   CALCIUM 7.0* 7.1*   MG 1.8  --      CBC:   Recent  Labs  Lab 07/28/18  0400   WBC 3.14*   RBC 2.15*   HGB 7.0*   HCT 21.5*   PLT 33*   *   MCH 32.6*   MCHC 32.6     CMP:   Recent Labs  Lab 07/28/18  0400  07/28/18  1640 07/28/18  1916   *  221*  221*  < > 273* 287*   CALCIUM 7.0*  7.0*  7.0*  < > 7.0* 7.1*   ALBUMIN 2.2*  2.2*  --  2.5*  --    PROT 3.8*  --   --   --      139  139  < > 137 139   K 4.3  4.3  4.3  < > 4.2 4.3   CO2 24  24  24  < > 20* 21*     110  110  < > 112* 112*   BUN 9  9  9  < > 12 14   CREATININE 0.7  0.7  0.7  < > 0.8 1.0   ALKPHOS 111  --   --   --    ALT 67*  --   --   --    AST 41*  --   --   --    BILITOT 0.8  --   --   --    < > = values in this interval not displayed.  All labs within the past 24 hours have been reviewed.       Assessment/Plan:     Acute kidney injury superimposed on chronic kidney disease    - Will hold SLED for today since CVP on lower end 1-2   - Currently of pressors which he has tolerated well  - Continue to be anuric (UOP= 35 cc in morning). Will give trial of lasix IV today   - Will probably anticipate if patient continues to be anuric will put back on SLED   - continue to monitor serial RFPs and strict Is & Os, Bladder scans q BID   - Avoid nephrotoxic medications  - Hb > 7gm/dL              Dae Yarbrough  Nephrology  Fellow  [facility]    Pager [phone]

## 2018-07-30 PROBLEM — T38.0X5A STEROID-INDUCED HYPERGLYCEMIA: Status: RESOLVED | Noted: 2018-07-24 | Resolved: 2018-07-30

## 2018-07-30 PROBLEM — E87.20 ACIDOSIS: Status: RESOLVED | Noted: 2018-06-13 | Resolved: 2018-07-30

## 2018-07-30 PROBLEM — T68.XXXA HYPOTHERMIA: Status: RESOLVED | Noted: 2018-07-19 | Resolved: 2018-07-30

## 2018-07-30 PROBLEM — R73.9 STEROID-INDUCED HYPERGLYCEMIA: Status: RESOLVED | Noted: 2018-07-24 | Resolved: 2018-07-30

## 2018-07-30 PROBLEM — Z76.82 ORGAN TRANSPLANT CANDIDATE: Status: RESOLVED | Noted: 2018-07-20 | Resolved: 2018-07-30

## 2018-07-30 LAB
ALBUMIN SERPL BCP-MCNC: 2.2 G/DL
ALP SERPL-CCNC: 101 U/L
ALT SERPL W/O P-5'-P-CCNC: 62 U/L
ANION GAP SERPL CALC-SCNC: 6 MMOL/L
APTT BLDCRRT: 29.1 SEC
AST SERPL-CCNC: 32 U/L
BACTERIA SPEC ANAEROBE CULT: NORMAL
BASOPHILS # BLD AUTO: 0 K/UL
BASOPHILS NFR BLD: 0 %
BILIRUB DIRECT SERPL-MCNC: 0.4 MG/DL
BILIRUB SERPL-MCNC: 0.5 MG/DL
BUN SERPL-MCNC: 28 MG/DL
CALCIUM SERPL-MCNC: 7 MG/DL
CHLORIDE SERPL-SCNC: 109 MMOL/L
CO2 SERPL-SCNC: 21 MMOL/L
CREAT SERPL-MCNC: 2 MG/DL
DIFFERENTIAL METHOD: ABNORMAL
EOSINOPHIL # BLD AUTO: 0.2 K/UL
EOSINOPHIL NFR BLD: 3.2 %
ERYTHROCYTE [DISTWIDTH] IN BLOOD BY AUTOMATED COUNT: 17 %
EST. GFR  (AFRICAN AMERICAN): 40.2 ML/MIN/1.73 M^2
EST. GFR  (NON AFRICAN AMERICAN): 34.7 ML/MIN/1.73 M^2
GGT SERPL-CCNC: 140 U/L
GLUCOSE SERPL-MCNC: 139 MG/DL
HCT VFR BLD AUTO: 22.5 %
HGB BLD-MCNC: 7.3 G/DL
IMM GRANULOCYTES # BLD AUTO: 0.04 K/UL
IMM GRANULOCYTES NFR BLD AUTO: 0.9 %
INR PPP: 1.3
LYMPHOCYTES # BLD AUTO: 0.6 K/UL
LYMPHOCYTES NFR BLD: 13.2 %
MCH RBC QN AUTO: 31.7 PG
MCHC RBC AUTO-ENTMCNC: 32.4 G/DL
MCV RBC AUTO: 98 FL
MONOCYTES # BLD AUTO: 0.4 K/UL
MONOCYTES NFR BLD: 8.7 %
NEUTROPHILS # BLD AUTO: 3.5 K/UL
NEUTROPHILS NFR BLD: 74 %
NRBC BLD-RTO: 0 /100 WBC
PHOSPHATE SERPL-MCNC: 3.4 MG/DL
PLATELET # BLD AUTO: 45 K/UL
PMV BLD AUTO: 12.2 FL
POCT GLUCOSE: 113 MG/DL (ref 70–110)
POCT GLUCOSE: 185 MG/DL (ref 70–110)
POCT GLUCOSE: 206 MG/DL (ref 70–110)
POCT GLUCOSE: 210 MG/DL (ref 70–110)
POTASSIUM SERPL-SCNC: 4.4 MMOL/L
PROT SERPL-MCNC: 3.7 G/DL
PROTHROMBIN TIME: 13.2 SEC
RBC # BLD AUTO: 2.3 M/UL
SODIUM SERPL-SCNC: 136 MMOL/L
TACROLIMUS BLD-MCNC: 3.5 NG/ML
WBC # BLD AUTO: 4.69 K/UL

## 2018-07-30 PROCEDURE — 25000003 PHARM REV CODE 250: Performed by: STUDENT IN AN ORGANIZED HEALTH CARE EDUCATION/TRAINING PROGRAM

## 2018-07-30 PROCEDURE — P9047 ALBUMIN (HUMAN), 25%, 50ML: HCPCS | Mod: JG | Performed by: STUDENT IN AN ORGANIZED HEALTH CARE EDUCATION/TRAINING PROGRAM

## 2018-07-30 PROCEDURE — 94761 N-INVAS EAR/PLS OXIMETRY MLT: CPT

## 2018-07-30 PROCEDURE — 25000003 PHARM REV CODE 250: Performed by: SURGERY

## 2018-07-30 PROCEDURE — 63600175 PHARM REV CODE 636 W HCPCS: Performed by: STUDENT IN AN ORGANIZED HEALTH CARE EDUCATION/TRAINING PROGRAM

## 2018-07-30 PROCEDURE — 63600175 PHARM REV CODE 636 W HCPCS: Performed by: SURGERY

## 2018-07-30 PROCEDURE — 25000003 PHARM REV CODE 250: Performed by: TRANSPLANT SURGERY

## 2018-07-30 PROCEDURE — 99232 SBSQ HOSP IP/OBS MODERATE 35: CPT | Mod: GC,,, | Performed by: INTERNAL MEDICINE

## 2018-07-30 PROCEDURE — 25000003 PHARM REV CODE 250: Performed by: PEDIATRICS

## 2018-07-30 PROCEDURE — 82977 ASSAY OF GGT: CPT

## 2018-07-30 PROCEDURE — 80197 ASSAY OF TACROLIMUS: CPT

## 2018-07-30 PROCEDURE — 63600175 PHARM REV CODE 636 W HCPCS: Mod: JG | Performed by: STUDENT IN AN ORGANIZED HEALTH CARE EDUCATION/TRAINING PROGRAM

## 2018-07-30 PROCEDURE — 81300000 HC LIVER ACQUISITION CHARGE

## 2018-07-30 PROCEDURE — 82248 BILIRUBIN DIRECT: CPT

## 2018-07-30 PROCEDURE — 63600175 PHARM REV CODE 636 W HCPCS: Performed by: NURSE PRACTITIONER

## 2018-07-30 PROCEDURE — 63600175 PHARM REV CODE 636 W HCPCS: Performed by: INTERNAL MEDICINE

## 2018-07-30 PROCEDURE — 84100 ASSAY OF PHOSPHORUS: CPT

## 2018-07-30 PROCEDURE — 85730 THROMBOPLASTIN TIME PARTIAL: CPT

## 2018-07-30 PROCEDURE — 85025 COMPLETE CBC W/AUTO DIFF WBC: CPT

## 2018-07-30 PROCEDURE — 20600001 HC STEP DOWN PRIVATE ROOM

## 2018-07-30 PROCEDURE — 81300005 HC LIVER TRANSPORT, GROUND 4-5 HOURS

## 2018-07-30 PROCEDURE — 85610 PROTHROMBIN TIME: CPT

## 2018-07-30 PROCEDURE — 99233 SBSQ HOSP IP/OBS HIGH 50: CPT | Mod: ,,, | Performed by: SURGERY

## 2018-07-30 PROCEDURE — 94799 UNLISTED PULMONARY SVC/PX: CPT

## 2018-07-30 PROCEDURE — 63600175 PHARM REV CODE 636 W HCPCS: Performed by: PEDIATRICS

## 2018-07-30 PROCEDURE — 97110 THERAPEUTIC EXERCISES: CPT

## 2018-07-30 PROCEDURE — 80053 COMPREHEN METABOLIC PANEL: CPT

## 2018-07-30 PROCEDURE — 97530 THERAPEUTIC ACTIVITIES: CPT

## 2018-07-30 RX ORDER — SODIUM CHLORIDE 9 MG/ML
INJECTION, SOLUTION INTRAVENOUS
Status: DISCONTINUED | OUTPATIENT
Start: 2018-07-30 | End: 2018-07-30

## 2018-07-30 RX ORDER — SODIUM CHLORIDE 9 MG/ML
INJECTION, SOLUTION INTRAVENOUS
Status: DISCONTINUED | OUTPATIENT
Start: 2018-07-30 | End: 2018-08-03

## 2018-07-30 RX ORDER — SODIUM CHLORIDE 9 MG/ML
INJECTION, SOLUTION INTRAVENOUS ONCE
Status: DISCONTINUED | OUTPATIENT
Start: 2018-07-30 | End: 2018-08-03

## 2018-07-30 RX ORDER — TACROLIMUS 1 MG/1
5 CAPSULE ORAL 2 TIMES DAILY
Status: DISCONTINUED | OUTPATIENT
Start: 2018-07-30 | End: 2018-08-04

## 2018-07-30 RX ORDER — SODIUM CHLORIDE 9 MG/ML
INJECTION, SOLUTION INTRAVENOUS ONCE
Status: DISCONTINUED | OUTPATIENT
Start: 2018-07-30 | End: 2018-07-30

## 2018-07-30 RX ORDER — ALBUMIN HUMAN 250 G/1000ML
25 SOLUTION INTRAVENOUS EVERY 8 HOURS
Status: COMPLETED | OUTPATIENT
Start: 2018-07-30 | End: 2018-07-30

## 2018-07-30 RX ADMIN — PREDNISONE 20 MG: 20 TABLET ORAL at 08:07

## 2018-07-30 RX ADMIN — INSULIN ASPART 4 UNITS: 100 INJECTION, SOLUTION INTRAVENOUS; SUBCUTANEOUS at 05:07

## 2018-07-30 RX ADMIN — ALBUMIN HUMAN 25 G: 0.25 SOLUTION INTRAVENOUS at 09:07

## 2018-07-30 RX ADMIN — CEFTRIAXONE SODIUM 2 G: 2 INJECTION, POWDER, FOR SOLUTION INTRAMUSCULAR; INTRAVENOUS at 05:07

## 2018-07-30 RX ADMIN — MYCOPHENOLATE MOFETIL 1000 MG: 250 CAPSULE ORAL at 09:07

## 2018-07-30 RX ADMIN — PANTOPRAZOLE SODIUM 40 MG: 40 TABLET, DELAYED RELEASE ORAL at 09:07

## 2018-07-30 RX ADMIN — INSULIN ASPART 2 UNITS: 100 INJECTION, SOLUTION INTRAVENOUS; SUBCUTANEOUS at 05:07

## 2018-07-30 RX ADMIN — FUROSEMIDE 100 MG: 10 INJECTION, SOLUTION INTRAVENOUS at 08:07

## 2018-07-30 RX ADMIN — LEVETIRACETAM 500 MG: 500 TABLET ORAL at 09:07

## 2018-07-30 RX ADMIN — INSULIN ASPART 4 UNITS: 100 INJECTION, SOLUTION INTRAVENOUS; SUBCUTANEOUS at 09:07

## 2018-07-30 RX ADMIN — TACROLIMUS 4 MG: 1 CAPSULE ORAL at 08:07

## 2018-07-30 RX ADMIN — INSULIN ASPART 4 UNITS: 100 INJECTION, SOLUTION INTRAVENOUS; SUBCUTANEOUS at 02:07

## 2018-07-30 RX ADMIN — MYCOPHENOLATE MOFETIL 1000 MG: 250 CAPSULE ORAL at 10:07

## 2018-07-30 RX ADMIN — TRIAMCINOLONE ACETONIDE: 1 CREAM TOPICAL at 09:07

## 2018-07-30 RX ADMIN — HEPARIN SODIUM 5000 UNITS: 5000 INJECTION, SOLUTION INTRAVENOUS; SUBCUTANEOUS at 02:07

## 2018-07-30 RX ADMIN — ATORVASTATIN CALCIUM 40 MG: 20 TABLET, FILM COATED ORAL at 08:07

## 2018-07-30 RX ADMIN — INSULIN DETEMIR 12 UNITS: 100 INJECTION, SOLUTION SUBCUTANEOUS at 09:07

## 2018-07-30 RX ADMIN — OXYCODONE HYDROCHLORIDE AND ACETAMINOPHEN 1000 MG: 500 TABLET ORAL at 08:07

## 2018-07-30 RX ADMIN — ACYCLOVIR 400 MG: 200 CAPSULE ORAL at 08:07

## 2018-07-30 RX ADMIN — THIAMINE HCL TAB 100 MG 100 MG: 100 TAB at 08:07

## 2018-07-30 RX ADMIN — ACYCLOVIR 400 MG: 200 CAPSULE ORAL at 09:07

## 2018-07-30 RX ADMIN — HEPARIN SODIUM 5000 UNITS: 5000 INJECTION, SOLUTION INTRAVENOUS; SUBCUTANEOUS at 09:07

## 2018-07-30 RX ADMIN — FLUCONAZOLE 200 MG: 200 TABLET ORAL at 08:07

## 2018-07-30 RX ADMIN — LEVETIRACETAM 500 MG: 500 TABLET ORAL at 08:07

## 2018-07-30 RX ADMIN — FUROSEMIDE 100 MG: 10 INJECTION, SOLUTION INTRAVENOUS at 05:07

## 2018-07-30 RX ADMIN — HEPARIN SODIUM 5000 UNITS: 5000 INJECTION, SOLUTION INTRAVENOUS; SUBCUTANEOUS at 05:07

## 2018-07-30 RX ADMIN — INSULIN ASPART 1 UNITS: 100 INJECTION, SOLUTION INTRAVENOUS; SUBCUTANEOUS at 10:07

## 2018-07-30 RX ADMIN — DOCUSATE SODIUM 100 MG: 100 CAPSULE, LIQUID FILLED ORAL at 08:07

## 2018-07-30 RX ADMIN — TACROLIMUS 5 MG: 5 CAPSULE ORAL at 05:07

## 2018-07-30 RX ADMIN — SULFAMETHOXAZOLE AND TRIMETHOPRIM 1 TABLET: 400; 80 TABLET ORAL at 08:07

## 2018-07-30 RX ADMIN — TRIAMCINOLONE ACETONIDE: 1 CREAM TOPICAL at 08:07

## 2018-07-30 RX ADMIN — ALBUMIN HUMAN 25 G: 0.25 SOLUTION INTRAVENOUS at 02:07

## 2018-07-30 RX ADMIN — PANTOPRAZOLE SODIUM 40 MG: 40 TABLET, DELAYED RELEASE ORAL at 08:07

## 2018-07-30 NOTE — SUBJECTIVE & OBJECTIVE
Interval History:  NAEO. CRRT early yesterday morning. No need for dialysis yesterday. Pending eval for HD by nephrologyy. Otherwise, progressing well and tolerating a PO diet. Sits up to end of bed with PT. Denies any abdominal pain, N/V, CP, SOB.    Scheduled Meds:   sodium chloride 0.9%   Intravenous Once    acyclovir  400 mg Oral BID    albumin human 25%  25 g Intravenous Q8H    ascorbic acid (vitamin C)  1,000 mg Oral Daily    atorvastatin  40 mg Oral Daily    cefTRIAXone (ROCEPHIN) IVPB  2 g Intravenous Q24H    docusate sodium  100 mg Oral Daily    ergocalciferol  50,000 Units Oral Q7 Days    fluconazole  200 mg Oral Daily    furosemide  100 mg Intravenous BID    heparin (porcine)  5,000 Units Subcutaneous Q8H    insulin aspart U-100  4 Units Subcutaneous TIDWM    insulin detemir U-100  12 Units Subcutaneous Daily    levETIRAcetam  500 mg Oral BID    mycophenolate  1,000 mg Oral BID    pantoprazole  40 mg Oral BID    predniSONE  20 mg Oral Daily    sulfamethoxazole-trimethoprim 400-80mg  1 tablet Oral Daily    tacrolimus  4 mg Oral BID    triamcinolone acetonide 0.1%   Topical (Top) BID     Continuous Infusions:  PRN Meds:sodium chloride 0.9%, dextrose 50%, dextrose 50%, gelatin adsorbable 100cm top sponge, glucagon (human recombinant), glucose, glucose, insulin aspart U-100, metoclopramide HCl, ondansetron, oxyCODONE, oxyCODONE, sodium chloride 0.9%    Review of Systems   Constitutional: Negative.    HENT: Negative.    Respiratory: Negative.    Cardiovascular: Negative.    Gastrointestinal: Negative for abdominal pain.   Genitourinary: Negative.    Musculoskeletal: Positive for back pain and joint swelling.   Skin: Positive for wound.   Neurological: Negative.    Psychiatric/Behavioral: Negative for confusion.     Objective:     Vital Signs (Most Recent):  Temp: 97.8 °F (36.6 °C) (07/30/18 0700)  Pulse: (!) 50 (07/30/18 0915)  Resp: 16 (07/30/18 0915)  BP: (!) 112/57 (07/30/18 0915)  SpO2:  99 % (07/30/18 0915) Vital Signs (24h Range):  Temp:  [97.7 °F (36.5 °C)-98.1 °F (36.7 °C)] 97.8 °F (36.6 °C)  Pulse:  [46-71] 50  Resp:  [13-33] 16  SpO2:  [97 %-100 %] 99 %  BP: ()/(50-62) 112/57  Arterial Line BP: (122-148)/(39-48) 142/47     Weight: 128.9 kg (284 lb 2.8 oz)  Body mass index is 36.49 kg/m².    Intake/Output - Last 3 Shifts       07/28 0700 - 07/29 0659 07/29 0700 - 07/30 0659 07/30 0700 - 07/31 0659    P.O.  720     I.V. (mL/kg) 2465 (19.1) 163 (1.3)     Blood 242      NG/GT 90      Total Intake(mL/kg) 2797 (21.7) 883 (6.9)     Urine (mL/kg/hr) 20 (0)      Other 2912 (0.9)      Total Output 2932        Net -135 +883             Urine Occurrence 2 x 3 x     Stool Occurrence  1 x           Physical Exam   Constitutional: He is oriented to person, place, and time. He appears well-developed and well-nourished.   HENT:   Head: Normocephalic and atraumatic.   Eyes: No scleral icterus.   Cardiovascular: Normal rate and regular rhythm.    No murmur heard.  Pulmonary/Chest: Effort normal and breath sounds normal. He has no wheezes.   Abdominal: Soft. He exhibits no distension.   Abdominal incision c/d/i with staples in place   Musculoskeletal: He exhibits edema.   Lymphadenopathy:     He has no cervical adenopathy.   Neurological: He is alert and oriented to person, place, and time.   Skin: Skin is warm and dry. He is not diaphoretic. There is erythema.   Dry flaking skin throughout       Laboratory:  Immunosuppressants         Stop Route Frequency     tacrolimus capsule 4 mg      -- Oral 2 times daily     mycophenolate capsule 1,000 mg      -- Oral 2 times daily        CBC:     Recent Labs  Lab 07/30/18  0400   WBC 4.69   RBC 2.30*   HGB 7.3*   HCT 22.5*   PLT 45*   MCV 98   MCH 31.7*   MCHC 32.4     CMP:     Recent Labs  Lab 07/30/18  0400   *   CALCIUM 7.0*   ALBUMIN 2.2*   PROT 3.7*      K 4.4   CO2 21*      BUN 28*   CREATININE 2.0*   ALKPHOS 101   ALT 62*   AST 32   BILITOT  0.5     Coagulation:     Recent Labs  Lab 07/30/18  0400   INR 1.3*   APTT 29.1     Labs within the past 24 hours have been reviewed.    Diagnostic Results:  I have personally reviewed all pertinent imaging studies.

## 2018-07-30 NOTE — PLAN OF CARE
Problem: Patient Care Overview  Goal: Plan of Care Review  Outcome: Ongoing (interventions implemented as appropriate)  POC reviewed with Pt and family. Pt AAOX4 and follows commands.Pt O2 sats stable on RA.  Pt BS are coarse. Pt BS are active. Pt transverse abd incision dry and intact. Pt SBP has been 90s-100s. HRs been 40s-50s. Turning Pt Q2. Pt denies pain. Pt slept comfortably. Family at bedside. Pt plan to have HD trial today. VSS. Will continue to monitor.

## 2018-07-30 NOTE — PROGRESS NOTES
[facility]  Liver Transplant  Progress Note    Patient Name: Alon Adamson  MRN: [id number]  Admission Date: 2018  Hospital Length of Stay: 38 days  Code Status: Full Code  Primary Care Provider: Mckinley Vides MD  Post-Op Day 7 Days Post-Op    Admit Diagnosis: Alcoholic cirrhosis  Procedures: Liver transplant    ORGAN:   LIVER  Disease Etiology: Alcoholic Cirrhosis  Donor Type:    - Brain Death  CDC High Risk:   No  Donor CMV Status:   Donor CMV Status: Negative  Donor HBcAB:   Negative  Donor HCV Status:   Negative  Donor HBV TAMAR: Negative  Donor HCV TAMAR: Negative  Whole or Partial: Whole Liver  Biliary Anastomosis: End to End  Arterial Anatomy: Standard    Subjective:     Interval History:  NAEO. CRRT early yesterday morning. No need for dialysis yesterday. Pending eval for HD by nephrologyy. Otherwise, progressing well and tolerating a PO diet. Sits up to end of bed with PT. Denies any abdominal pain, N/V, CP, SOB.    Scheduled Meds:   sodium chloride 0.9%   Intravenous Once    acyclovir  400 mg Oral BID    albumin human 25%  25 g Intravenous Q8H    ascorbic acid (vitamin C)  1,000 mg Oral Daily    atorvastatin  40 mg Oral Daily    cefTRIAXone (ROCEPHIN) IVPB  2 g Intravenous Q24H    docusate sodium  100 mg Oral Daily    ergocalciferol  50,000 Units Oral Q7 Days    fluconazole  200 mg Oral Daily    furosemide  100 mg Intravenous BID    heparin (porcine)  5,000 Units Subcutaneous Q8H    insulin aspart U-100  4 Units Subcutaneous TIDWM    insulin detemir U-100  12 Units Subcutaneous Daily    levETIRAcetam  500 mg Oral BID    mycophenolate  1,000 mg Oral BID    pantoprazole  40 mg Oral BID    predniSONE  20 mg Oral Daily    sulfamethoxazole-trimethoprim 400-80mg  1 tablet Oral Daily    tacrolimus  4 mg Oral BID    triamcinolone acetonide 0.1%   Topical (Top) BID     Continuous Infusions:  PRN Meds:sodium chloride 0.9%, dextrose 50%, dextrose 50%, gelatin  adsorbable 100cm top sponge, glucagon (human recombinant), glucose, glucose, insulin aspart U-100, metoclopramide HCl, ondansetron, oxyCODONE, oxyCODONE, sodium chloride 0.9%    Review of Systems   Constitutional: Negative.    HENT: Negative.    Respiratory: Negative.    Cardiovascular: Negative.    Gastrointestinal: Negative for abdominal pain.   Genitourinary: Negative.    Musculoskeletal: Positive for back pain and joint swelling.   Skin: Positive for wound.   Neurological: Negative.    Psychiatric/Behavioral: Negative for confusion.     Objective:     Vital Signs (Most Recent):  Temp: 97.8 °F (36.6 °C) (07/30/18 0700)  Pulse: (!) 50 (07/30/18 0915)  Resp: 16 (07/30/18 0915)  BP: (!) 112/57 (07/30/18 0915)  SpO2: 99 % (07/30/18 0915) Vital Signs (24h Range):  Temp:  [97.7 °F (36.5 °C)-98.1 °F (36.7 °C)] 97.8 °F (36.6 °C)  Pulse:  [46-71] 50  Resp:  [13-33] 16  SpO2:  [97 %-100 %] 99 %  BP: ()/(50-62) 112/57  Arterial Line BP: (122-148)/(39-48) 142/47     Weight: 128.9 kg (284 lb 2.8 oz)  Body mass index is 36.49 kg/m².    Intake/Output - Last 3 Shifts       07/28 0700 - 07/29 0659 07/29 0700 - 07/30 0659 07/30 0700 - 07/31 0659    P.O.  720     I.V. (mL/kg) 2465 (19.1) 163 (1.3)     Blood 242      NG/GT 90      Total Intake(mL/kg) 2797 (21.7) 883 (6.9)     Urine (mL/kg/hr) 20 (0)      Other 2912 (0.9)      Total Output 2932        Net -135 +883             Urine Occurrence 2 x 3 x     Stool Occurrence  1 x           Physical Exam   Constitutional: He is oriented to person, place, and time. He appears well-developed and well-nourished.   HENT:   Head: Normocephalic and atraumatic.   Eyes: No scleral icterus.   Cardiovascular: Normal rate and regular rhythm.    No murmur heard.  Pulmonary/Chest: Effort normal and breath sounds normal. He has no wheezes.   Abdominal: Soft. He exhibits no distension.   Abdominal incision c/d/i with staples in place   Musculoskeletal: He exhibits edema.   Lymphadenopathy:     He  has no cervical adenopathy.   Neurological: He is alert and oriented to person, place, and time.   Skin: Skin is warm and dry. He is not diaphoretic. There is erythema.   Dry flaking skin throughout       Laboratory:  Immunosuppressants         Stop Route Frequency     tacrolimus capsule 4 mg      -- Oral 2 times daily     mycophenolate capsule 1,000 mg      -- Oral 2 times daily        CBC:     Recent Labs  Lab 07/30/18  0400   WBC 4.69   RBC 2.30*   HGB 7.3*   HCT 22.5*   PLT 45*   MCV 98   MCH 31.7*   MCHC 32.4     CMP:     Recent Labs  Lab 07/30/18  0400   *   CALCIUM 7.0*   ALBUMIN 2.2*   PROT 3.7*      K 4.4   CO2 21*      BUN 28*   CREATININE 2.0*   ALKPHOS 101   ALT 62*   AST 32   BILITOT 0.5     Coagulation:     Recent Labs  Lab 07/30/18  0400   INR 1.3*   APTT 29.1     Labs within the past 24 hours have been reviewed.    Diagnostic Results:  I have personally reviewed all pertinent imaging studies.    Assessment/Plan:   Alon Adamson is a 63 y.o. male     Neuro   Seizures    - Oral Keppra transitioned to IV as pt not able to swallow 6/26.  - Resumed oral Keppra since mentation improved.  - EEG 6/26 negative.        Derm   Cholestatic pruritus    - cholestyramine has previously been ineffective, given depressed mood.   - Zoloft for mood and pruritus.        Cardiac/Vascular   Coronary artery disease involving native coronary artery of native heart without angina pectoris    - Continue home ASA.   - 2D Echo 7/9 normal EF (55-60%), trivial tricuspid/mitral regurg.        Renal/   CKD (chronic kidney disease), stage III    See VICKY. KTM consulted to assess need for kidney transplant.             Acidosis    Continue oral bicarb replacement. Monitor with daily labs.           Acute kidney injury superimposed on chronic kidney disease    -With CKD3   -VICKY initially improving with albumin infusion but with minimal intake 6/25 and 6/26.  - nephrology consulted due to hypervolemia and  "multiple electrolyte abnormalities (acidosis, hypernatremia)  - Creatinine continues to worsen, likely to need RRT in the near future if doesn't improve. Discussed with Nephrology. Plan for lasix trial per Nephrology. Give IV Lasix 80 mg. Monitor.         Hematology   Coagulopathy    - Secondary to decompensated liver disease.         Thrombocytopenia    - Secondary to splenomegaly from portal HTN- should improve with txp.  - No s/s overt bleed.        Oncology   Pancytopenia    - related to decompensated liver disease         Anemia of chronic disease    - H&H decreased on 7/18 and one unit of PRBCs given for replacement at that time.   - With good response to transfusion.   - H/H stable. Continue to monitor.          Endocrine   Severe protein-calorie malnutrition    - Dietary consulted for calorie count.  - Pt eating better accord to family.  Does take boost supplement.  - "pre-hab" regimen and supplements ordered. Dc creon due to excessive diarrhea  - Patient drinking at least 2-3 boost supplements and beneprotein daily.   - If continues to have low intake, consider starting Megace.           GI   * Liver transplanted    63 year old male with ESLD 2/2 EtOH and ESRD s/p liver transplant 7/23/18    Neuro:   - Sedation: None  - Pain control: PRN PO Oxycodone  - Continue Keppra for h/o seizures     Pulmonary:   - stable on RA, sats > 95%  - IS/OOBTC     Cardiac:  - Drips: None  - New asymptomatic bradycardia. Junctional bradycardia on EKG  - Atropine at bedside, pacer pads in place  - Cardiology agrees with atropine for any hemodynamic instability, otherwise no intervention required     Renal:   - BUN/CR: 28/2.0  - per Nephrology, will discuss with team today about HD trial  - Trend BMP     Infectious Disease:   - Afebrile, WBC normal   - Immunosuppression with Tacro, Cellcept, MPN  - Opp infection ppx with Bactrim, Valcyte, Nystatin  - Rocephin x14 days for E. Coli+ Bcx in donor  - Trend " WBC     Hematology/Oncology:  - H/H stable  - INR 1.3  - Trend coags  - Trend CBC     Endocrine:  - Insulin gtt     Fluids/Electrolytes/Nutrition/GI:   - Soft puree diet, advance as tolerated  - Trend CMP  - Replace Lytes PRN      Dispo:  Stable for stepdown to TSU pending eval by nephrology  Primary team: Transplant Surgery          Ascites due to alcoholic cirrhosis    - Para 6/25 negative for infection   - Para 7/10 negative for infection. Monitor.           Decompensated hepatic cirrhosis    - Placed status 7 on 6/26 for prescription drug coverage change. Re-activated 7/3. Back on hold d/t frailty and decompensation at the end of last week prompting ICU transfer. Now off hold 7/17.  Plan to submit MELD exception points. Will remeld today at 27.       MELD-Na score: 27 at 7/21/2018  4:01 AM  MELD score: 27 at 7/21/2018  4:01 AM  Calculated from:  Serum Creatinine: 3.7 mg/dL at 7/21/2018  4:00 AM  Serum Sodium: 147 mmol/L (Rounded to 137) at 7/21/2018  4:00 AM  Total Bilirubin: 1.6 mg/dL at 7/21/2018  4:00 AM  INR(ratio): 1.7 at 7/21/2018  4:01 AM  Age: 63 years        Hepatic encephalopathy    - ESLD secondary to EtOH cirrhosis with severe complications (hyperbilirubinemia, hypoalbuminemia, severe malnutrition, hepatic encephalopathy, thrombocytopenia, splenomegaly, ascites, hypervolemia, coaguloopathy, portal HTN), seizure disorder on Keppra, CKD3, CAD   - has frequent episodes of lethargy/confusion/slowing when holding lactulose   - admitted to SICU 7/13/18 for worsening encephalopathy and hypothermia  - PO lactulose TID, PRN lactulose enemas, Rifaximin  - sleepy this AM. Given lactulose enema with improvement in mentation. Continue oral lactulose and PRN lactulose enemas for HE control.           Orthopedic   Hypothermia    - with episodes of hypothermia on 7/17 and 7/18.   - BS antibxs on board for prophylaxis.   - blood cultures NGTD.   - ID consulted for further management.           Other   Organ  transplant candidate              Physical deconditioning    - PT/OT consulted.    - Encourage OOBTC for several hours each day, walking with walker with PT        Hypoalbuminemia due to protein-calorie malnutrition    - see severe protein-calorie malnutrition         Bilateral edema of lower extremity    - Completed vanc x 7 days for BLE cellulitis  - 2D Echo 7/9 normal EF (55-60%), trivial tricuspid/mitral regurg.  - Vanc now restarted given recent episodes of hypothermia.   -Started trial of lasix per Nephrology.             The patients clinical status was discussed at multidisplinary rounds, involving transplant surgery, transplant medicine, pharmacy, nursing, nutrition, and social work.    Terry Huff MD  Liver Transplant  [facility]

## 2018-07-30 NOTE — PLAN OF CARE
Problem: Physical Therapy Goal  Goal: Physical Therapy Goal  Goals to be met by: 2018    Patient will increase functional independence with mobility by performin. Supine to sit with moderate assistance - not met  2. Sit to stand with moderate assistance - not met  3. Gait x 50ft with moderate assistance with RW. - not met  4. Ascend/descend 5 stair with right Handrails with moderate assistance. - not met  5. Lower extremity exercise program x 20 reps, with supervision, in order to increase LE strength and (I) with functional mobility.               Outcome: Ongoing (interventions implemented as appropriate)  Goals remain appropriate.  Rasheeda Osman, PT  2018

## 2018-07-30 NOTE — PT/OT/SLP PROGRESS
"Physical Therapy Treatment    Patient Name:  Alon Adamson   MRN:  [id number]    Recommendations:     Discharge Recommendations:  nursing facility, skilled   Discharge Equipment Recommendations:     Barriers to discharge: Decreased caregiver support    Assessment:     Alon Adamson is a 63 y.o. male admitted with a medical diagnosis of Liver transplanted.  He presents with the following impairments/functional limitations:  weakness, impaired functional mobilty, impaired endurance, gait instability.    Rehab Prognosis:  good; patient would benefit from acute skilled PT services to address these deficits and reach maximum level of function.      Recent Surgery: Procedure(s) (LRB):  TRANSPLANT, LIVER (N/A) 7 Days Post-Op    Plan:     During this hospitalization, patient to be seen 4 x/week to address the above listed problems via gait training, therapeutic activities, therapeutic exercises  · Plan of Care Expires:  08/26/18   Plan of Care Reviewed with: patient    Subjective     Communicated with RN prior to session.  Patient found supine upon PT entry to room, agreeable to treatment.      Chief Complaint: "I have this burning sensation."  Pain/Comfort:  · Pain Rating 1: 6/10  · Location 1: abdomen  · Pain Addressed 1: Distraction    Patients cultural, spiritual, Jewish conflicts given the current situation: none reported     Objective:     Patient found with:  (pulse ox, Bp cuff, tele, IV, SCDs)     General Precautions: Standard,  (fall)   Orthopedic Precautions:N/A   Braces: N/A     Functional Mobility:  · Bed Mobility:     · Supine to Sit: total assistance  · Sit to Supine: total assistance  · Transfers:     · Sit to Stand:  maximal assistance with hand-held assist  · Balance: poor      AM-PAC 6 CLICK MOBILITY  Turning over in bed (including adjusting bedclothes, sheets and blankets)?: 2  Sitting down on and standing up from a chair with arms (e.g., wheelchair, bedside commode, etc.): 2  Moving from " lying on back to sitting on the side of the bed?: 2  Moving to and from a bed to a chair (including a wheelchair)?: 1  Need to walk in hospital room?: 1  Climbing 3-5 steps with a railing?: 1  Basic Mobility Total Score: 9       Therapeutic Activities and Exercises:   Sat EOB x 15 min with SBA  Sit>stand with max assist x 2 with B HHA  Pt transferred to cardiac chair with total assist via drawsheet.    Patient left seated in cardiac chair with all lines intact and call button in reach..    GOALS:    Physical Therapy Goals        Problem: Physical Therapy Goal    Goal Priority Disciplines Outcome Goal Variances Interventions   Physical Therapy Goal     PT/OT, PT Ongoing (interventions implemented as appropriate)     Description:  Goals to be met by: 2018    Patient will increase functional independence with mobility by performin. Supine to sit with moderate assistance - not met  2. Sit to stand with moderate assistance - not met  3. Gait x 50ft with moderate assistance with RW. - not met  4. Ascend/descend 5 stair with right Handrails with moderate assistance. - not met  5. Lower extremity exercise program x 20 reps, with supervision, in order to increase LE strength and (I) with functional mobility.                                Time Tracking:     PT Received On: 18  PT Start Time: 1000     PT Stop Time: 1030  PT Total Time (min): 30 min     Billable Minutes: Therapeutic Activity 25    Treatment Type: Treatment  PT/PTA: PT     PTA Visit Number: 0     Rasheeda Osman, PT  2018

## 2018-07-30 NOTE — PT/OT/SLP PROGRESS
"Occupational Therapy   Treatment    Name: Alon Adamson  MRN: [id number]  Admitting Diagnosis:  Liver transplanted  7 Days Post-Op    Recommendations:     Discharge Recommendations: nursing facility, skilled  Discharge Equipment Recommendations:   (TBD closer to d/c)  Barriers to discharge:  Decreased caregiver support, Inaccessible home environment    Subjective     Communicated with: RN prior to session.  Pain/Comfort:  · Pain Rating 1: 0/10 (endorses "burning" in L abdomen)  · Pain Addressed 1: Distraction, Reposition    Patients cultural, spiritual, Advent conflicts given the current situation: none reported    Objective:     Patient found with: blood pressure cuff, pulse ox (continuous), telemetry, central line (Midline)    General Precautions: Standard, fall   Orthopedic Precautions:N/A   Braces:       Occupational Performance:    Bed Mobility:    · Patient completed Rolling/Turning to Right with maximal assistance x 2  · Patient completed Scooting/Bridging with maximal assistance x 2  · Patient completed Supine to Sit with maximal assistance x 2  · Patient completed Sit to Supine with maximal assistance  X 2    Functional Mobility/Transfers:  · Patient completed Sit <> Stand Transfer with maximal assistance and of 2 persons  with  no assistive device from EOB x 2 trials; pt ed on upright posture 2* FFP  · Functional Mobility: NT    Activities of Daily Living:  · Grooming: maximal assistance seated EOB    Patient left up in chair with all lines intact, call button in reach, RN notified and mother present    [facility] 6 Click:  [facility] Total Score: 7    Treatment & Education:  Pt ed on OT POC  Pt sat EOB x 15 min with SBA<>CGA while engaged in self-care ROM ex's  Pt completed 10 reps of  BUE AAROM ex's in major planes  Pt transferred to [facility] via dependent drawsheet  Education:    Assessment:     Alon Adamson is a 63 y.o. male with a medical diagnosis of Liver transplanted.  Performance deficits " affecting function are weakness, gait instability, impaired balance, impaired endurance, impaired self care skills, impaired functional mobilty, decreased coordination, pain, decreased safety awareness, decreased upper extremity function, decreased lower extremity function, decreased ROM, impaired coordination, impaired cardiopulmonary response to activity, edema.      Rehab Prognosis:  Good; patient would benefit from acute skilled OT services to address these deficits and reach maximum level of function.       Plan:     Patient to be seen 4 x/week to address the above listed problems via self-care/home management, therapeutic activities, therapeutic exercises  · Plan of Care Expires: 08/26/18  · Plan of Care Reviewed with: patient, mother    This Plan of care has been discussed with the patient who was involved in its development and understands and is in agreement with the identified goals and treatment plan    GOALS:    Occupational Therapy Goals        Problem: Occupational Therapy Goal    Goal Priority Disciplines Outcome Interventions   Occupational Therapy Goal     OT, PT/OT Ongoing (interventions implemented as appropriate)    Description:  Goals to be met by: 8/10/18    Patient will increase functional independence with ADLs by performing:     Upper body dressing while seated EOB with SBA  Grooming while standing at sink with Stand-by Assistance  Toileting from bedside commode with Minimal Assistance for hygiene and clothing management.  Toilet transfer to bedside commode with Contact Guard Assistance.  Pt will complete a functional static standing activity x ~4 minutes with CGA.   LB Dressing with Moderate Assistance                            Time Tracking:     OT Date of Treatment: 07/30/18  OT Start Time: 0948  OT Stop Time: 1016  OT Total Time (min): 28 min    Billable Minutes:Self Care/Home Management 10  Therapeutic Exercise 15    ERROL Delacruz  7/30/2018

## 2018-07-30 NOTE — ASSESSMENT & PLAN NOTE
Started on RRT, today with increasing sCr, although acid/base and lytes are fine, he is still significantly volume overloaded    - will do ultrafiltration for four hours with a goal of removing 3L  - strict Is & Os, Bladder scans q BID   - Avoid nephrotoxic medications  - Hb > 7gm/dL

## 2018-07-30 NOTE — ASSESSMENT & PLAN NOTE
63 year old male with ESLD 2/2 EtOH and ESRD s/p liver transplant 7/23/18    Neuro:   - Sedation: None  - Pain control: PRN PO Oxycodone  - Continue Keppra for h/o seizures     Pulmonary:   - stable on RA, sats > 95%  - IS/OOBTC     Cardiac:  - Drips: None  - New asymptomatic bradycardia. Junctional bradycardia on EKG  - Atropine at bedside, pacer pads in place  - Cardiology agrees with atropine for any hemodynamic instability, otherwise no intervention required     Renal:   - BUN/CR: 28/2.0  - per Nephrology, will discuss with team today about HD trial  - Trend BMP     Infectious Disease:   - Afebrile, WBC normal   - Immunosuppression with Tacro, Cellcept, MPN  - Opp infection ppx with Bactrim, Valcyte, Nystatin  - Rocephin x14 days for E. Coli+ Bcx in donor  - Trend WBC     Hematology/Oncology:  - H/H stable  - INR 1.3  - Trend coags  - Trend CBC     Endocrine:  - Insulin gtt     Fluids/Electrolytes/Nutrition/GI:   - Soft puree diet, advance as tolerated  - Trend CMP  - Replace Lytes PRN      Dispo:  Stable for stepdown to TSU pending eval by nephrology  Primary team: Transplant Surgery

## 2018-07-30 NOTE — PLAN OF CARE
Reviewed current insulin regimen and BG levels.  Pt continues to have post-prandial excursions, most likely secondary to steroids.     Goal B-180 mg/dl    Continue Levemir 12 units, daily  Would increase Aspart to 5 units with meals.  POC AC/HS  Low dose correction     Will continue to follow

## 2018-07-30 NOTE — SUBJECTIVE & OBJECTIVE
Interval History: last HD was Saturday, feeling good this morning, not short of breath when sitting still in bed, but still with significant edema/swelling    Review of patient's allergies indicates:   Allergen Reactions    Nsaids (non-steroidal anti-inflammatory drug)      D/t to liver disease.    Tylenol [acetaminophen]      D/t liver disease.    Penicillins Other (See Comments)     Tolerated pip-tazo in 8/2016 without issue  Tolerated cefepime 7/2018 without issue  Since childhood was told not to take it     Current Facility-Administered Medications   Medication Frequency    0.9%  NaCl infusion PRN    0.9%  NaCl infusion Once    acyclovir capsule 400 mg BID    albumin human 25% bottle 25 g Q8H    ascorbic acid (vitamin C) tablet 1,000 mg Daily    atorvastatin tablet 40 mg Daily    cefTRIAXone (ROCEPHIN) 2 g in dextrose 5 % 50 mL IVPB Q24H    dextrose 50% injection 12.5 g PRN    dextrose 50% injection 25 g PRN    docusate sodium capsule 100 mg Daily    ergocalciferol capsule 50,000 Units Q7 Days    fluconazole tablet 200 mg Daily    furosemide injection 100 mg BID    gelatin adsorbable 100cm top sponge 100 sponge 1 applicator PRN    glucagon (human recombinant) injection 1 mg PRN    glucose chewable tablet 16 g PRN    glucose chewable tablet 24 g PRN    heparin (porcine) injection 5,000 Units Q8H    insulin aspart U-100 pen 0-5 Units QID (AC + HS) PRN    insulin aspart U-100 pen 4 Units TIDWM    insulin detemir U-100 pen 12 Units Daily    levETIRAcetam tablet 500 mg BID    metoclopramide HCl injection 10 mg QID (AC + HS) PRN    mycophenolate capsule 1,000 mg BID    ondansetron injection 4 mg Q8H PRN    oxyCODONE immediate release tablet 10 mg Q6H PRN    oxyCODONE immediate release tablet 5 mg Q6H PRN    pantoprazole EC tablet 40 mg BID    predniSONE tablet 20 mg Daily    sodium chloride 0.9% flush 3 mL PRN    sulfamethoxazole-trimethoprim 400-80mg per tablet 1 tablet Daily     tacrolimus capsule 5 mg BID    triamcinolone acetonide 0.1% cream BID       Objective:     Vital Signs (Most Recent):  Temp: 98.1 °F (36.7 °C) (07/30/18 1500)  Pulse: 61 (07/30/18 1645)  Resp: 17 (07/30/18 1645)  BP: (!) 122/58 (07/30/18 1645)  SpO2: 98 % (07/30/18 1645)  O2 Device (Oxygen Therapy): room air (07/30/18 0744) Vital Signs (24h Range):  Temp:  [97.7 °F (36.5 °C)-98.1 °F (36.7 °C)] 98.1 °F (36.7 °C)  Pulse:  [46-82] 61  Resp:  [13-37] 17  SpO2:  [93 %-100 %] 98 %  BP: ()/(50-79) 122/58     Weight: 128.9 kg (284 lb 2.8 oz) (07/29/18 0400)  Body mass index is 36.49 kg/m².  Body surface area is 2.59 meters squared.    I/O last 3 completed shifts:  In: 1172 [P.O.:720; I.V.:452]  Out: -     Physical Exam   HENT:   Head: Atraumatic.   Neck: No JVD present.   Cardiovascular: Normal rate and regular rhythm.  Exam reveals no friction rub.    Pulmonary/Chest: Effort normal. He has rales (at bases).   Abdominal: Soft. He exhibits no distension.   Musculoskeletal: He exhibits edema.   Neurological: He is alert.   Skin: Skin is warm.   Psychiatric: He has a normal mood and affect.

## 2018-07-30 NOTE — ASSESSMENT & PLAN NOTE
Started on RRT, today with increasing sCr, although acid/base and lytes are fine, he is still significantly volume overloaded    - no HD yesterday, UF only, sCr has gone up, but not as much as would have been expected without any renal function, in short, may be recovering somewhat, still and likely will need further RRT    - tentatively planning for full HD session tomorrow, 3hrs  - strict Is & Os and serial RFPs  - Avoid nephrotoxic medications  - Hb > 7gm/dL

## 2018-07-30 NOTE — PHYSICIAN QUERY
"PT Name: Alon Adamson  MR #: [id number]    Physician Query Form - Hematology Clarification      CDS/: Danielle Calderon RN, CDI             Contact information:[phone]    This form is a permanent document in the medical record.      Query Date: July 30, 2018    By submitting this query, we are merely seeking further clarification of documentation. Please utilize your independent clinical judgment when addressing the question(s) below.    The Medical record contains the following:   Indicators  Supporting Clinical Findings Location in Medical Record   x "Anemia" documented Anemia of chronic disease   - H&H decreased on 7/18 and one unit of PRBCs given for replacement at that time.   - With good response to transfusion.   - H/H stable. Continue to monitor.     Progress Note 7/20       Transplant Liver    x H & H =      Hgb= 9.3-> 8.0-> 8.2-> 7.6-> 7.4-->8.3       Hct=27.2->25-> 24.3->22.8->22.8->26.1   Lab 7/12, 7/13, 7/14, 7/15, 7/16, 7/17    BP =                     HR=      "GI bleeding" documented     x Acute bleeding (Non GI site) Principal Procedure Perfomed:      Orthotopic Liver Transplant     Estimated Blood Loss: 79855 m L    Op Note 7/23          x Transfusion(s) H&H decreased, will transfuse 1 unit of PRBC today 7/18.   - 1 unit PRBC transfused 7/16/18.   - monitor.       Received 9 units PRBC, 2 FFP, 2 cryo, 2 platelets, 4.4 L cell saver. Brought to the ICU intubated and off pressors.    Progress Note 7/18       Transplant Liver          H&P 7/23       Critical Care Surgery              Treatment:     x Other:  S/p OLT on 7/23 with intraop CRRT. Brought to the ICU intubated,  not requiring pressor support. Significant blood loss, with aggressive resuscitation intraop         63 yr old male with decompensated alcoholic cirrhosis, CKD III and CAD.      H&P 7/23       Critical Care Surgery           H&P 7/23       Critical Care Surgery      Provider, please specify diagnosis or diagnoses " associated with above clinical findings.        Please select all that apply      [x  ] Acute blood loss anemia expected post-operatively    [  ] Anemia of chronic disease ( Specify chronic disease)      [  ] CKD (specify stage) ___________________________     [  ] Other (Specify) _______________________________     [  ] Clinically Undetermined     [  ] Other Hematological Diagnosis (please specify): _________________________________    [  ] Clinically Undetermined      Please document in your progress notes daily for the duration of treatment, until resolved, and include in your discharge summary.

## 2018-07-30 NOTE — PROGRESS NOTES
SW met with pt and pts mother Dary this morning at bedside in ICU.  Pt presents alert and oriented x 4, but per transplant residents, patient has been confused.  Pt and pts mother report that pts wife back home working.  Pts mother is staying with patient at bedside.   Pt engaging with good eye contact this morning.  Pt denies symptoms of depression or anxiety and denies any difficulty coping at this time.   Pt and pts mom report patient to get HD at bedside today and hopefully transfer to TSU tomorrow, pt happy about being able to transfer.   Pt and pts mom deny any psychosocial needs at this time.  SW remains available for all transplant resources, education and psychosocial support.

## 2018-07-30 NOTE — PROGRESS NOTES
[facility]  Critical Care - Surgery  Progress Note    Patient Name: Alon Adamson  MRN: [id number]  Admission Date: 6/22/2018  Hospital Length of Stay: 38 days  Code Status: Full Code  Attending Provider: Williams Moon Jr., MD  Primary Care Provider: Mckinley Vides MD   Principal Problem: Liver transplanted    Subjective:     Hospital/ICU Course:  Per tranplant, Patient is listed for liver transplant, placed on internal hold 6/25 for HE then status 7 on 6/26 due to change in prescription coverage. He was re-activated on the list 7/3, MELD 22. Pt started on IV diuretics 6/22 and continued 6/23 and 6/24.  BLE edema and ascites sign if improved with diuresis and kidney function also improved. During admission, patient had worsening encephalopathy and asterixis prompting infectious work up, all blood and urine cultures obtained during admission with NGTD. Intermittently, encephalopathy severe enough to warrant lactulose enemas. CT head obtained and negative. Paracentesis 6/25 negative for peritonitis per cell count and U/A negative. Pt also with concern for BLE cellulitis- upper legs bright red with lower BLE still with dark appearance of venous stasis. Placed on vancomycin with improvement in BLE redness 6/26. Vanc continued for 7 days (ended 7/1). His HE waxed and waned for several days requiring lactulose enemas. Pt eating minimally during period of encephalopathy requiring gentle hydration with IVF 6/26. Of note, taco placement attempted 6/26 but unable secondary to agitation upon insertion prompting self resolving nose bleed. Micro lab called 6/26 PM with blood cx + for G+C in blood- pt continued on vanc which was started 6/25. ID consulted, suspect + blood cultures contaminant. EEG obtained 6/26 as pt with h/o serizures and negative. Pt continued on home Keppra. VICKY on admission initially improved with diuresis, but Cr sakina intermittently during admission likely related to aggressive  diuresis. Of note, with chest pain overnight 6/26-6/27 that resolved without intervention and pt reported as mild.  EKG NSR with PVC, troponin 0.1 in setting of hypervolemia + VICKY.  Normal dobutamine stress 1/2018.  Cardiology consulted and felt not ACS, no need for further workup other than continue to treat current risk factors for CAD with ASA (pt already on ASA and statin as outpt) + BB for portal hypertension which was started. Repeat paracentesis 7/10, 4.8L off, no SBP. Hypernatremia noted 7/9, nephrology consulted to aid in management. Hypernatremia attributed to dehydration from frequent BM's, diuretics were held, d/c'd creon due to excessive diarrhea, and patient was treated with increasing po free water and IVF. Echo 7/9 with normal EF, no wall abnormality, mild tricuspid/mitral regurg.     Overnight from 7/12 to 7/13, pt became hypothermic to 91.2 corrected with warming blankets, experienced worsening encephalopathy, hypotensive to 80/40.  Transferred to SICU.  Corrected with albumin 5% bolus.  Given additional lactulose.    7/14- no acute issues, ammonia level decreased, mentation improving    7/23-OLT  7/24 - unable to tolerate full CRRT due to hypotension, on vasopressin 0.04, unable to wean vent due to mental status, pulm function good, low PLT being monitored, no acute bleed.  AST/ALT improving.  7/25 - extubated, vasopressin weaned off, received CRRT, 1U PLT  7/26 - no acute events, mental status much better  7/27 - CRRT paused for hypotension, needed to be restarted on vaso 0.04, asymptomatic bradycardia  7/28 - CRRT held, 1U PRBC transfused  7/29 - no acute events    Interval History/Significant Events: RHIANNON KAUR    Follow-up For: Procedure(s) (LRB):  TRANSPLANT, LIVER (N/A)    Post-Operative Day: 7 Days Post-Op    Objective:     Vital Signs (Most Recent):  Temp: 97.7 °F (36.5 °C) (07/30/18 0315)  Pulse: (!) 46 (07/30/18 0541)  Resp: 17 (07/30/18 0541)  BP: (!) 110/56 (07/30/18 0541)  SpO2: 100  % (07/30/18 0541) Vital Signs (24h Range):  Temp:  [97.6 °F (36.4 °C)-98.1 °F (36.7 °C)] 97.7 °F (36.5 °C)  Pulse:  [45-71] 46  Resp:  [13-33] 17  SpO2:  [97 %-100 %] 100 %  BP: ()/(50-62) 110/56  Arterial Line BP: (122-148)/(39-50) 142/47     Weight: 128.9 kg (284 lb 2.8 oz)  Body mass index is 36.49 kg/m².      Intake/Output Summary (Last 24 hours) at 07/30/18 0658  Last data filed at 07/30/18 0500   Gross per 24 hour   Intake              883 ml   Output                0 ml   Net              883 ml       Physical Exam   Constitutional: He is oriented to person, place, and time.   Appears severely ill, bitemporal wasting   HENT:   Head: Normocephalic.   Right Ear: External ear normal.   Left Ear: External ear normal.   Eyes: EOM are normal. Pupils are equal, round, and reactive to light. Right eye exhibits no discharge. Left eye exhibits no discharge.   Cardiovascular: Regular rhythm and normal heart sounds.  Exam reveals no gallop and no friction rub.    No murmur heard.  Sinus bradycardia   Pulmonary/Chest: Effort normal and breath sounds normal. No respiratory distress. He has no wheezes. He has no rales. He exhibits no tenderness.   Abdominal: Soft. He exhibits no distension and no mass. There is tenderness. There is no rebound and no guarding. No hernia.   Chevron scar from OLT c/d/i and dressing over former drain site in RLQ; appropriately tender   Genitourinary:   Genitourinary Comments: Severe scrotal edema   Musculoskeletal: He exhibits edema.   Neurological: He is alert and oriented to person, place, and time.   Skin: Skin is warm and dry. No erythema.   Nursing note and vitals reviewed.      Vents:  Vent Mode: Spont (07/25/18 0929)  Set Rate: 16 bmp (07/25/18 0700)  Vt Set: 500 mL (07/23/18 1446)  Pressure Support: 10 cmH20 (07/25/18 0929)  PEEP/CPAP: 5 cmH20 (07/25/18 0929)  Oxygen Concentration (%): 28 (07/27/18 6775)  Peak Airway Pressure: 16 cmH2O (07/25/18 0929)  Plateau Pressure: 12 cmH20  (07/25/18 0929)  Total Ve: 14.1 mL (07/25/18 0929)  Negative Inspiratory Force (cm H2O): -21 (07/25/18 1020)  F/VT Ratio<105 (RSBI): (!) 24.86 (07/25/18 0700)    Lines/Drains/Airways     Central Venous Catheter Line                 Introducer 07/23/18 0500 right internal jugular 7 days         Percutaneous Central Line Insertion/Assessment - double lumen  07/23/18 0500 right internal jugular 7 days         Percutaneous Central Line Insertion/Assessment - triple lumen  07/23/18 0530 right internal jugular 7 days          Peripheral Intravenous Line                 Midline Catheter Insertion/Assessment  - Single Lumen 07/20/18 1128 Right cephalic vein (lateral side of arm) 18g x 10cm 9 days                Significant Labs:    CBC/Anemia Profile:    Recent Labs  Lab 07/29/18  0409 07/30/18  0400   WBC 3.58* 4.69   HGB 7.1* 7.3*   HCT 21.2* 22.5*   PLT 32* 45*   MCV 96 98   RDW 17.2* 17.0*        Chemistries:    Recent Labs  Lab 07/28/18  1640 07/28/18  1916 07/29/18  0409 07/30/18  0400    139 138  138  138 136   K 4.2 4.3 4.2  4.2  4.2 4.4   * 112* 111*  111*  111* 109   CO2 20* 21* 21*  21*  21* 21*   BUN 12 14 17  17  17 28*   CREATININE 0.8 1.0 1.3  1.3  1.3 2.0*   CALCIUM 7.0* 7.1* 7.0*  7.0*  7.0* 7.0*   ALBUMIN 2.5*  --  2.4*  2.4* 2.2*   PROT  --   --  3.7* 3.7*   BILITOT  --   --  0.7 0.5   ALKPHOS  --   --  95 101   ALT  --   --  56* 62*   AST  --   --  27 32   MG 1.8  --  2.1  --    PHOS 2.2*  --  3.5  3.5 3.4       All pertinent labs within the past 24 hours have been reviewed.    Significant Imaging:  I have reviewed all pertinent imaging results/findings within the past 24 hours.    Assessment/Plan:     Hepatic encephalopathy    63 yr old male with decompensated alcoholic cirrhosis, CKD III and CAD. Hospitalized with HE, VICKY on CKD. S/p OLT on 7/23 with intraop CRRT. Brought to the ICU intubated, not requiring pressor support. Significant blood loss, with aggressive  resuscitation intraop    Neuro  -hx seizures and HE  -keppra  -AAOX4    Cardiovascular  - cards consulted for junctional bradycardia; determined it was sinus bradycardia 2/2 electrolyte derangement and recommended keeping atropine at bedside in case it becomes symptomatic  - h/o CAD  - 2D Echo 7/9 normal EF (55-60%), trivial tricuspid/mitral regurg  - vasopressin weaned off 7/25, restarted 7/27 for hypotension during CRRT, radial A-line placed.  - bradycardia since 7/27 after starting vaso, possible baroreceptor reflex  -vaso turned off again 7/28, continues to have asymptomatic bradycardia in the 40s.    Respiratory:  - extubated 7/25  - no acute issues    Renal:  -VICKY on CKD-intraop CRRT, anuric  -Nephrology following, on CRRT, HD trial 7/30  -[place], BUN/Cr    Liver and Pancreas  -s/p OLT 7/23   -Trend liver enzymes, enzymes and Tbili improving, but PT/INR worse than yesterday  -PLTs low.  Transfuse per Transplant Surgery    GI  -Clear liquids  -Replete lytes prn       ID  -Anti-rejection, antiretrovirals as per primary  -monitor WBC    Heme  -h/h stable, thrombocytopenia. 14 L blood loss intraop.   RBC (Units) 9   FFP (Units) 2   Cryoprecipitate (Units)  2   Platelets (Units) 2   -PLT transfusion per Transplant Surgery  -some heme output in OG tube, continue to monitor CBC and output  -Hep 5k DVT ppx    Dispo  -Primary team Transplant Surgery  -Monitor platelets, any bleeding, and other LFTs  -Wean vasopressin and CRRT  -Continue SICU care           Critical care was time spent personally by me on the following activities: development of treatment plan with patient or surrogate and bedside caregivers, discussions with consultants, evaluation of patient's response to treatment, examination of patient, ordering and performing treatments and interventions, ordering and review of laboratory studies, ordering and review of radiographic studies, pulse oximetry, re-evaluation of patient's condition.  This critical care  time did not overlap with that of any other provider or involve time for any procedures.     Ernesto Fraire MD  Critical Care - Surgery  [facility]

## 2018-07-30 NOTE — PROGRESS NOTES
[facility]  Nephrology  Progress Note    Patient Name: Alon Adamson  MRN: [id number]  Admission Date: 6/22/2018  Hospital Length of Stay: 38 days  Attending Provider: Williams Moon Jr., MD   Primary Care Physician: Mckinley Vides MD  Principal Problem:Liver transplanted    Subjective:     HPI: Mr. Adamson is 63 yr old male with decompensated alcoholic cirrhosis, CKD III and CAD admitted here on 06/22/18 admitted for bilateral LE hypervolemia and VICKY on CKD III with cr of 2.3 baseline around 1.5. Patient has multiple hospitalization in the past secondary to decompensated liver failure. Patient is currently on transplant team. During current admission patient was getting lasix and albumin intermittently for VICKY however renal function was not getting better. Patient hospital course complicated by hepatic encephalopathy with some improvement of mentation with lactulose. Also treated for cellulitis with 7 days of vancomycin.Nephrology is consulted for worsening/not improving VICKY despite lasix/albumin.     Per chart review patient has no hypotensive episodes, BP mostly above 100's. Has not received nephrotoxins such as NSAIDs/contrast media. No sever sepsis/septic shock or acute blood loss during current admit. UA with 2+ leukocytes and Hyaline casts, no wbc/rbc cast or proteinuria. Urine Na+ < 20.    Patient was transferred to ICU on 7/13/2018 after being more lethargic and hypoactive.  After two days was stepped down back to [facility] [place].       Interval History: last HD was Saturday, feeling good this morning, not short of breath when sitting still in bed, but still with significant edema/swelling    Review of patient's allergies indicates:   Allergen Reactions    Nsaids (non-steroidal anti-inflammatory drug)      D/t to liver disease.    Tylenol [acetaminophen]      D/t liver disease.    Penicillins Other (See Comments)     Tolerated pip-tazo in 8/2016 without issue  Tolerated cefepime  7/2018 without issue  Since childhood was told not to take it     Current Facility-Administered Medications   Medication Frequency    0.9%  NaCl infusion PRN    0.9%  NaCl infusion Once    acyclovir capsule 400 mg BID    albumin human 25% bottle 25 g Q8H    ascorbic acid (vitamin C) tablet 1,000 mg Daily    atorvastatin tablet 40 mg Daily    cefTRIAXone (ROCEPHIN) 2 g in dextrose 5 % 50 mL IVPB Q24H    dextrose 50% injection 12.5 g PRN    dextrose 50% injection 25 g PRN    docusate sodium capsule 100 mg Daily    ergocalciferol capsule 50,000 Units Q7 Days    fluconazole tablet 200 mg Daily    furosemide injection 100 mg BID    gelatin adsorbable 100cm top sponge 100 sponge 1 applicator PRN    glucagon (human recombinant) injection 1 mg PRN    glucose chewable tablet 16 g PRN    glucose chewable tablet 24 g PRN    heparin (porcine) injection 5,000 Units Q8H    insulin aspart U-100 pen 0-5 Units QID (AC + HS) PRN    insulin aspart U-100 pen 4 Units TIDWM    insulin detemir U-100 pen 12 Units Daily    levETIRAcetam tablet 500 mg BID    metoclopramide HCl injection 10 mg QID (AC + HS) PRN    mycophenolate capsule 1,000 mg BID    ondansetron injection 4 mg Q8H PRN    oxyCODONE immediate release tablet 10 mg Q6H PRN    oxyCODONE immediate release tablet 5 mg Q6H PRN    pantoprazole EC tablet 40 mg BID    predniSONE tablet 20 mg Daily    sodium chloride 0.9% flush 3 mL PRN    sulfamethoxazole-trimethoprim 400-80mg per tablet 1 tablet Daily    tacrolimus capsule 5 mg BID    triamcinolone acetonide 0.1% cream BID       Objective:     Vital Signs (Most Recent):  Temp: 98.1 °F (36.7 °C) (07/30/18 1500)  Pulse: 61 (07/30/18 1645)  Resp: 17 (07/30/18 1645)  BP: (!) 122/58 (07/30/18 1645)  SpO2: 98 % (07/30/18 1645)  O2 Device (Oxygen Therapy): room air (07/30/18 0744) Vital Signs (24h Range):  Temp:  [97.7 °F (36.5 °C)-98.1 °F (36.7 °C)] 98.1 °F (36.7 °C)  Pulse:  [46-82] 61  Resp:  [13-37]  17  SpO2:  [93 %-100 %] 98 %  BP: ()/(50-79) 122/58     Weight: 128.9 kg (284 lb 2.8 oz) (07/29/18 0400)  Body mass index is 36.49 kg/m².  Body surface area is 2.59 meters squared.    I/O last 3 completed shifts:  In: 1172 [P.O.:720; I.V.:452]  Out: -     Physical Exam   HENT:   Head: Atraumatic.   Neck: No JVD present.   Cardiovascular: Normal rate and regular rhythm.  Exam reveals no friction rub.    Pulmonary/Chest: Effort normal. He has rales (at bases).   Abdominal: Soft. He exhibits no distension.   Musculoskeletal: He exhibits edema.   Neurological: He is alert.   Skin: Skin is warm.   Psychiatric: He has a normal mood and affect.         Assessment/Plan:     Acute kidney injury superimposed on chronic kidney disease    Started on RRT, today with increasing sCr, although acid/base and lytes are fine, he is still significantly volume overloaded    - will do ultrafiltration for four hours with a goal of removing 3L  - strict Is & Os, Bladder scans q BID   - Avoid nephrotoxic medications  - Hb > 7gm/dL            Thank you for your consult. I will sign off. Please contact us if you have any additional questions.    Elan Magaan MD  Nephrology  [facility]    ATTENDING PHYSICIAN ATTESTATION  I have personally interviewed and examined the patient. I thoroughly reviewed the demographic, clinical, laboratorial and imaging information available in medical records. I agree with the assessment and recommendations provided by the subspecialty resident. Dr. Magana was under my supervision.

## 2018-07-30 NOTE — PLAN OF CARE
Problem: Occupational Therapy Goal  Goal: Occupational Therapy Goal  Goals to be met by: 8/10/18    Patient will increase functional independence with ADLs by performing:     Upper body dressing while seated EOB with SBA  Grooming while standing at sink with Stand-by Assistance  Toileting from bedside commode with Minimal Assistance for hygiene and clothing management.  Toilet transfer to bedside commode with Contact Guard Assistance.  Pt will complete a functional static standing activity x ~4 minutes with CGA.   LB Dressing with Moderate Assistance           Outcome: Ongoing (interventions implemented as appropriate)  The above goals remain appropriate. ERROL Delacruz  7/30/2018

## 2018-07-30 NOTE — ASSESSMENT & PLAN NOTE
63 yr old male with decompensated alcoholic cirrhosis, CKD III and CAD. Hospitalized with HE, VICKY on CKD. S/p OLT on 7/23 with intraop CRRT. Brought to the ICU intubated, not requiring pressor support. Significant blood loss, with aggressive resuscitation intraop    Neuro  -hx seizures and HE  -keppra  -AAOX4    Cardiovascular  - cards consulted for junctional bradycardia; determined it was sinus bradycardia 2/2 electrolyte derangement and recommended keeping atropine at bedside in case it becomes symptomatic  - h/o CAD  - 2D Echo 7/9 normal EF (55-60%), trivial tricuspid/mitral regurg  - vasopressin weaned off 7/25, restarted 7/27 for hypotension during CRRT, radial A-line placed.  - bradycardia since 7/27 after starting vaso, possible baroreceptor reflex  -vaso turned off again 7/28, continues to have asymptomatic bradycardia in the 40s.    Respiratory:  - extubated 7/25  - no acute issues    Renal:  -VICKY on CKD-intraop CRRT, anuric  -Nephrology following, on CRRT, HD trial 7/30  -[place], BUN/Cr    Liver and Pancreas  -s/p OLT 7/23   -Trend liver enzymes, enzymes and Tbili improving, but PT/INR worse than yesterday  -PLTs low.  Transfuse per Transplant Surgery    GI  -Clear liquids  -Replete lytes prn       ID  -Anti-rejection, antiretrovirals as per primary  -monitor WBC    Heme  -h/h stable, thrombocytopenia. 14 L blood loss intraop.   RBC (Units) 9   FFP (Units) 2   Cryoprecipitate (Units)  2   Platelets (Units) 2   -PLT transfusion per Transplant Surgery  -some heme output in OG tube, continue to monitor CBC and output  -Hep 5k DVT ppx    Dispo  -Primary team Transplant Surgery  -Monitor platelets, any bleeding, and other LFTs  -Wean vasopressin and CRRT  -Continue SICU care

## 2018-07-30 NOTE — SUBJECTIVE & OBJECTIVE
Interval History/Significant Events: RHIANNON KAUR    Follow-up For: Procedure(s) (LRB):  TRANSPLANT, LIVER (N/A)    Post-Operative Day: 7 Days Post-Op    Objective:     Vital Signs (Most Recent):  Temp: 97.7 °F (36.5 °C) (07/30/18 0315)  Pulse: (!) 46 (07/30/18 0541)  Resp: 17 (07/30/18 0541)  BP: (!) 110/56 (07/30/18 0541)  SpO2: 100 % (07/30/18 0541) Vital Signs (24h Range):  Temp:  [97.6 °F (36.4 °C)-98.1 °F (36.7 °C)] 97.7 °F (36.5 °C)  Pulse:  [45-71] 46  Resp:  [13-33] 17  SpO2:  [97 %-100 %] 100 %  BP: ()/(50-62) 110/56  Arterial Line BP: (122-148)/(39-50) 142/47     Weight: 128.9 kg (284 lb 2.8 oz)  Body mass index is 36.49 kg/m².      Intake/Output Summary (Last 24 hours) at 07/30/18 0658  Last data filed at 07/30/18 0500   Gross per 24 hour   Intake              883 ml   Output                0 ml   Net              883 ml       Physical Exam   Constitutional: He is oriented to person, place, and time.   Appears severely ill, bitemporal wasting   HENT:   Head: Normocephalic.   Right Ear: External ear normal.   Left Ear: External ear normal.   Eyes: EOM are normal. Pupils are equal, round, and reactive to light. Right eye exhibits no discharge. Left eye exhibits no discharge.   Cardiovascular: Regular rhythm and normal heart sounds.  Exam reveals no gallop and no friction rub.    No murmur heard.  Sinus bradycardia   Pulmonary/Chest: Effort normal and breath sounds normal. No respiratory distress. He has no wheezes. He has no rales. He exhibits no tenderness.   Abdominal: Soft. He exhibits no distension and no mass. There is tenderness. There is no rebound and no guarding. No hernia.   Chevron scar from OLT c/d/i and dressing over former drain site in RLQ; appropriately tender   Genitourinary:   Genitourinary Comments: Severe scrotal edema   Musculoskeletal: He exhibits edema.   Neurological: He is alert and oriented to person, place, and time.   Skin: Skin is warm and dry. No erythema.   Nursing note  and vitals reviewed.      Vents:  Vent Mode: Spont (07/25/18 0929)  Set Rate: 16 bmp (07/25/18 0700)  Vt Set: 500 mL (07/23/18 1446)  Pressure Support: 10 cmH20 (07/25/18 0929)  PEEP/CPAP: 5 cmH20 (07/25/18 0929)  Oxygen Concentration (%): 28 (07/27/18 0445)  Peak Airway Pressure: 16 cmH2O (07/25/18 0929)  Plateau Pressure: 12 cmH20 (07/25/18 0929)  Total Ve: 14.1 mL (07/25/18 0929)  Negative Inspiratory Force (cm H2O): -21 (07/25/18 1020)  F/VT Ratio<105 (RSBI): (!) 24.86 (07/25/18 0700)    Lines/Drains/Airways     Central Venous Catheter Line                 Introducer 07/23/18 0500 right internal jugular 7 days         Percutaneous Central Line Insertion/Assessment - double lumen  07/23/18 0500 right internal jugular 7 days         Percutaneous Central Line Insertion/Assessment - triple lumen  07/23/18 0530 right internal jugular 7 days          Peripheral Intravenous Line                 Midline Catheter Insertion/Assessment  - Single Lumen 07/20/18 1128 Right cephalic vein (lateral side of arm) 18g x 10cm 9 days                Significant Labs:    CBC/Anemia Profile:    Recent Labs  Lab 07/29/18  0409 07/30/18  0400   WBC 3.58* 4.69   HGB 7.1* 7.3*   HCT 21.2* 22.5*   PLT 32* 45*   MCV 96 98   RDW 17.2* 17.0*        Chemistries:    Recent Labs  Lab 07/28/18  1640 07/28/18  1916 07/29/18  0409 07/30/18  0400    139 138  138  138 136   K 4.2 4.3 4.2  4.2  4.2 4.4   * 112* 111*  111*  111* 109   CO2 20* 21* 21*  21*  21* 21*   BUN 12 14 17  17  17 28*   CREATININE 0.8 1.0 1.3  1.3  1.3 2.0*   CALCIUM 7.0* 7.1* 7.0*  7.0*  7.0* 7.0*   ALBUMIN 2.5*  --  2.4*  2.4* 2.2*   PROT  --   --  3.7* 3.7*   BILITOT  --   --  0.7 0.5   ALKPHOS  --   --  95 101   ALT  --   --  56* 62*   AST  --   --  27 32   MG 1.8  --  2.1  --    PHOS 2.2*  --  3.5  3.5 3.4       All pertinent labs within the past 24 hours have been reviewed.    Significant Imaging:  I have reviewed all pertinent imaging  results/findings within the past 24 hours.

## 2018-07-31 ENCOUNTER — CONFERENCE (OUTPATIENT)
Dept: TRANSPLANT | Facility: CLINIC | Age: 63
End: 2018-07-31

## 2018-07-31 PROBLEM — K72.90 DECOMPENSATED HEPATIC CIRRHOSIS: Status: RESOLVED | Noted: 2018-01-21 | Resolved: 2018-07-31

## 2018-07-31 PROBLEM — K74.60 LIVER CIRRHOSIS: Status: RESOLVED | Noted: 2018-07-13 | Resolved: 2018-07-31

## 2018-07-31 PROBLEM — I87.2 ACUTE VENOUS STASIS DERMATITIS OF BOTH LEGS: Status: ACTIVE | Noted: 2018-07-31

## 2018-07-31 PROBLEM — R11.2 NAUSEA AND VOMITING: Status: RESOLVED | Noted: 2018-07-21 | Resolved: 2018-07-31

## 2018-07-31 PROBLEM — L29.8 CHOLESTATIC PRURITUS: Status: RESOLVED | Noted: 2018-06-13 | Resolved: 2018-07-31

## 2018-07-31 PROBLEM — Z91.89 AT RISK FOR OPPORTUNISTIC INFECTIONS: Status: ACTIVE | Noted: 2018-07-31

## 2018-07-31 PROBLEM — K74.60 DECOMPENSATED HEPATIC CIRRHOSIS: Status: RESOLVED | Noted: 2018-01-21 | Resolved: 2018-07-31

## 2018-07-31 PROBLEM — D68.9 COAGULOPATHY: Status: RESOLVED | Noted: 2018-06-22 | Resolved: 2018-07-31

## 2018-07-31 LAB
ALBUMIN SERPL BCP-MCNC: 2.8 G/DL
ALP SERPL-CCNC: 96 U/L
ALT SERPL W/O P-5'-P-CCNC: 55 U/L
ANION GAP SERPL CALC-SCNC: 7 MMOL/L
APTT BLDCRRT: 28.3 SEC
AST SERPL-CCNC: 22 U/L
BASOPHILS # BLD AUTO: 0 K/UL
BASOPHILS NFR BLD: 0 %
BILIRUB SERPL-MCNC: 0.8 MG/DL
BUN SERPL-MCNC: 34 MG/DL
CALCIUM SERPL-MCNC: 7.2 MG/DL
CHLORIDE SERPL-SCNC: 107 MMOL/L
CO2 SERPL-SCNC: 20 MMOL/L
CREAT SERPL-MCNC: 2.5 MG/DL
DIFFERENTIAL METHOD: ABNORMAL
EOSINOPHIL # BLD AUTO: 0.2 K/UL
EOSINOPHIL NFR BLD: 3.7 %
ERYTHROCYTE [DISTWIDTH] IN BLOOD BY AUTOMATED COUNT: 16.9 %
EST. GFR  (AFRICAN AMERICAN): 30.7 ML/MIN/1.73 M^2
EST. GFR  (NON AFRICAN AMERICAN): 26.5 ML/MIN/1.73 M^2
GLUCOSE SERPL-MCNC: 135 MG/DL
HAV IGM SERPL QL IA: NEGATIVE
HBV CORE IGM SERPL QL IA: NEGATIVE
HBV SURFACE AG SERPL QL IA: NEGATIVE
HCT VFR BLD AUTO: 22.7 %
HCV AB SERPL QL IA: NEGATIVE
HGB BLD-MCNC: 7.6 G/DL
IMM GRANULOCYTES # BLD AUTO: 0.06 K/UL
IMM GRANULOCYTES NFR BLD AUTO: 1 %
INR PPP: 1.2
LYMPHOCYTES # BLD AUTO: 0.7 K/UL
LYMPHOCYTES NFR BLD: 11.6 %
MAGNESIUM SERPL-MCNC: 2 MG/DL
MCH RBC QN AUTO: 32.5 PG
MCHC RBC AUTO-ENTMCNC: 33.5 G/DL
MCV RBC AUTO: 97 FL
MONOCYTES # BLD AUTO: 0.6 K/UL
MONOCYTES NFR BLD: 10.2 %
NEUTROPHILS # BLD AUTO: 4.3 K/UL
NEUTROPHILS NFR BLD: 73.5 %
NRBC BLD-RTO: 0 /100 WBC
PHOSPHATE SERPL-MCNC: 3.3 MG/DL
PLATELET # BLD AUTO: 52 K/UL
PMV BLD AUTO: 13.5 FL
POCT GLUCOSE: 142 MG/DL (ref 70–110)
POCT GLUCOSE: 161 MG/DL (ref 70–110)
POCT GLUCOSE: 162 MG/DL (ref 70–110)
POCT GLUCOSE: 185 MG/DL (ref 70–110)
POTASSIUM SERPL-SCNC: 4.1 MMOL/L
PROT SERPL-MCNC: 4.4 G/DL
PROTHROMBIN TIME: 11.9 SEC
RBC # BLD AUTO: 2.34 M/UL
SODIUM SERPL-SCNC: 134 MMOL/L
TACROLIMUS BLD-MCNC: 6.6 NG/ML
WBC # BLD AUTO: 5.88 K/UL

## 2018-07-31 PROCEDURE — 92610 EVALUATE SWALLOWING FUNCTION: CPT

## 2018-07-31 PROCEDURE — 99233 SBSQ HOSP IP/OBS HIGH 50: CPT | Mod: 24,,, | Performed by: PHYSICIAN ASSISTANT

## 2018-07-31 PROCEDURE — 97803 MED NUTRITION INDIV SUBSEQ: CPT

## 2018-07-31 PROCEDURE — 63600175 PHARM REV CODE 636 W HCPCS: Performed by: STUDENT IN AN ORGANIZED HEALTH CARE EDUCATION/TRAINING PROGRAM

## 2018-07-31 PROCEDURE — 25000003 PHARM REV CODE 250: Performed by: STUDENT IN AN ORGANIZED HEALTH CARE EDUCATION/TRAINING PROGRAM

## 2018-07-31 PROCEDURE — 80074 ACUTE HEPATITIS PANEL: CPT

## 2018-07-31 PROCEDURE — P9047 ALBUMIN (HUMAN), 25%, 50ML: HCPCS | Mod: JG | Performed by: PHYSICIAN ASSISTANT

## 2018-07-31 PROCEDURE — 25000003 PHARM REV CODE 250: Performed by: SURGERY

## 2018-07-31 PROCEDURE — 80100014 HC HEMODIALYSIS 1:1

## 2018-07-31 PROCEDURE — 99232 SBSQ HOSP IP/OBS MODERATE 35: CPT | Mod: GC,,, | Performed by: INTERNAL MEDICINE

## 2018-07-31 PROCEDURE — 94664 DEMO&/EVAL PT USE INHALER: CPT

## 2018-07-31 PROCEDURE — 80197 ASSAY OF TACROLIMUS: CPT

## 2018-07-31 PROCEDURE — 63600175 PHARM REV CODE 636 W HCPCS: Performed by: SURGERY

## 2018-07-31 PROCEDURE — 63600175 PHARM REV CODE 636 W HCPCS: Performed by: INTERNAL MEDICINE

## 2018-07-31 PROCEDURE — 25000003 PHARM REV CODE 250: Performed by: TRANSPLANT SURGERY

## 2018-07-31 PROCEDURE — 85730 THROMBOPLASTIN TIME PARTIAL: CPT

## 2018-07-31 PROCEDURE — 25000003 PHARM REV CODE 250: Performed by: PHYSICIAN ASSISTANT

## 2018-07-31 PROCEDURE — 99900035 HC TECH TIME PER 15 MIN (STAT)

## 2018-07-31 PROCEDURE — 63600175 PHARM REV CODE 636 W HCPCS: Performed by: NURSE PRACTITIONER

## 2018-07-31 PROCEDURE — 83735 ASSAY OF MAGNESIUM: CPT

## 2018-07-31 PROCEDURE — 85610 PROTHROMBIN TIME: CPT

## 2018-07-31 PROCEDURE — 25000003 PHARM REV CODE 250: Performed by: PEDIATRICS

## 2018-07-31 PROCEDURE — 85025 COMPLETE CBC W/AUTO DIFF WBC: CPT

## 2018-07-31 PROCEDURE — 63600175 PHARM REV CODE 636 W HCPCS: Mod: JG | Performed by: PHYSICIAN ASSISTANT

## 2018-07-31 PROCEDURE — 63600175 PHARM REV CODE 636 W HCPCS: Performed by: PEDIATRICS

## 2018-07-31 PROCEDURE — 25000003 PHARM REV CODE 250: Performed by: NURSE PRACTITIONER

## 2018-07-31 PROCEDURE — 80053 COMPREHEN METABOLIC PANEL: CPT

## 2018-07-31 PROCEDURE — 20600001 HC STEP DOWN PRIVATE ROOM

## 2018-07-31 PROCEDURE — 97110 THERAPEUTIC EXERCISES: CPT

## 2018-07-31 PROCEDURE — 99232 SBSQ HOSP IP/OBS MODERATE 35: CPT | Mod: ,,, | Performed by: NURSE PRACTITIONER

## 2018-07-31 PROCEDURE — 84100 ASSAY OF PHOSPHORUS: CPT

## 2018-07-31 PROCEDURE — 27000646 HC AEROBIKA DEVICE

## 2018-07-31 RX ORDER — INSULIN ASPART 100 [IU]/ML
5 INJECTION, SOLUTION INTRAVENOUS; SUBCUTANEOUS
Status: DISCONTINUED | OUTPATIENT
Start: 2018-07-31 | End: 2018-08-04

## 2018-07-31 RX ORDER — SULFAMETHOXAZOLE AND TRIMETHOPRIM 400; 80 MG/1; MG/1
1 TABLET ORAL
Status: DISCONTINUED | OUTPATIENT
Start: 2018-08-01 | End: 2018-08-04

## 2018-07-31 RX ORDER — SODIUM CHLORIDE 9 MG/ML
INJECTION, SOLUTION INTRAVENOUS ONCE
Status: DISCONTINUED | OUTPATIENT
Start: 2018-07-31 | End: 2018-08-03

## 2018-07-31 RX ORDER — SODIUM CHLORIDE 9 MG/ML
INJECTION, SOLUTION INTRAVENOUS
Status: DISCONTINUED | OUTPATIENT
Start: 2018-07-31 | End: 2018-08-03

## 2018-07-31 RX ORDER — ACYCLOVIR 200 MG/1
200 CAPSULE ORAL 2 TIMES DAILY
Status: DISCONTINUED | OUTPATIENT
Start: 2018-07-31 | End: 2018-08-04

## 2018-07-31 RX ORDER — ALBUMIN HUMAN 250 G/1000ML
25 SOLUTION INTRAVENOUS ONCE
Status: COMPLETED | OUTPATIENT
Start: 2018-07-31 | End: 2018-07-31

## 2018-07-31 RX ADMIN — TACROLIMUS 5 MG: 5 CAPSULE ORAL at 06:07

## 2018-07-31 RX ADMIN — LEVETIRACETAM 500 MG: 500 TABLET ORAL at 08:07

## 2018-07-31 RX ADMIN — INSULIN ASPART 5 UNITS: 100 INJECTION, SOLUTION INTRAVENOUS; SUBCUTANEOUS at 01:07

## 2018-07-31 RX ADMIN — INSULIN ASPART 4 UNITS: 100 INJECTION, SOLUTION INTRAVENOUS; SUBCUTANEOUS at 09:07

## 2018-07-31 RX ADMIN — FLUCONAZOLE 200 MG: 200 TABLET ORAL at 09:07

## 2018-07-31 RX ADMIN — PREDNISONE 20 MG: 20 TABLET ORAL at 09:07

## 2018-07-31 RX ADMIN — DOCUSATE SODIUM 100 MG: 100 CAPSULE, LIQUID FILLED ORAL at 09:07

## 2018-07-31 RX ADMIN — ATORVASTATIN CALCIUM 40 MG: 20 TABLET, FILM COATED ORAL at 09:07

## 2018-07-31 RX ADMIN — TRIAMCINOLONE ACETONIDE: 1 CREAM TOPICAL at 01:07

## 2018-07-31 RX ADMIN — HEPARIN SODIUM 5000 UNITS: 5000 INJECTION, SOLUTION INTRAVENOUS; SUBCUTANEOUS at 01:07

## 2018-07-31 RX ADMIN — TACROLIMUS 5 MG: 5 CAPSULE ORAL at 08:07

## 2018-07-31 RX ADMIN — INSULIN DETEMIR 12 UNITS: 100 INJECTION, SOLUTION SUBCUTANEOUS at 09:07

## 2018-07-31 RX ADMIN — PANTOPRAZOLE SODIUM 40 MG: 40 TABLET, DELAYED RELEASE ORAL at 09:07

## 2018-07-31 RX ADMIN — FUROSEMIDE 100 MG: 10 INJECTION, SOLUTION INTRAVENOUS at 06:07

## 2018-07-31 RX ADMIN — CEFTRIAXONE SODIUM 2 G: 2 INJECTION, POWDER, FOR SOLUTION INTRAMUSCULAR; INTRAVENOUS at 06:07

## 2018-07-31 RX ADMIN — PANTOPRAZOLE SODIUM 40 MG: 40 TABLET, DELAYED RELEASE ORAL at 08:07

## 2018-07-31 RX ADMIN — SULFAMETHOXAZOLE AND TRIMETHOPRIM 1 TABLET: 400; 80 TABLET ORAL at 09:07

## 2018-07-31 RX ADMIN — OXYCODONE HYDROCHLORIDE AND ACETAMINOPHEN 1000 MG: 500 TABLET ORAL at 09:07

## 2018-07-31 RX ADMIN — MYCOPHENOLATE MOFETIL 1000 MG: 250 CAPSULE ORAL at 09:07

## 2018-07-31 RX ADMIN — ACYCLOVIR 400 MG: 200 CAPSULE ORAL at 09:07

## 2018-07-31 RX ADMIN — HEPARIN SODIUM 5000 UNITS: 5000 INJECTION, SOLUTION INTRAVENOUS; SUBCUTANEOUS at 05:07

## 2018-07-31 RX ADMIN — ALBUMIN HUMAN 25 G: 0.25 SOLUTION INTRAVENOUS at 03:07

## 2018-07-31 RX ADMIN — MYCOPHENOLATE MOFETIL 1000 MG: 250 CAPSULE ORAL at 08:07

## 2018-07-31 RX ADMIN — ACYCLOVIR 200 MG: 200 CAPSULE ORAL at 08:07

## 2018-07-31 RX ADMIN — LEVETIRACETAM 500 MG: 500 TABLET ORAL at 09:07

## 2018-07-31 RX ADMIN — FUROSEMIDE 100 MG: 10 INJECTION, SOLUTION INTRAVENOUS at 10:07

## 2018-07-31 RX ADMIN — HEPARIN SODIUM 5000 UNITS: 5000 INJECTION, SOLUTION INTRAVENOUS; SUBCUTANEOUS at 08:07

## 2018-07-31 RX ADMIN — TRIAMCINOLONE ACETONIDE: 1 CREAM TOPICAL at 08:07

## 2018-07-31 NOTE — SUBJECTIVE & OBJECTIVE
Interval History: feeling ok this morning, making urine, went at least 3-4 times this morning already, unfortunately not recorded as he goes on the pad in the bed    Review of patient's allergies indicates:   Allergen Reactions    Nsaids (non-steroidal anti-inflammatory drug)      D/t to liver disease.    Tylenol [acetaminophen]      D/t liver disease.    Penicillins Other (See Comments)     Tolerated pip-tazo in 8/2016 without issue  Tolerated cefepime 7/2018 without issue  Since childhood was told not to take it     Current Facility-Administered Medications   Medication Frequency    0.9%  NaCl infusion PRN    0.9%  NaCl infusion Once    0.9%  NaCl infusion PRN    0.9%  NaCl infusion Once    acyclovir capsule 200 mg BID    albumin human 25% bottle 25 g Once    atorvastatin tablet 40 mg Daily    cefTRIAXone (ROCEPHIN) 2 g in dextrose 5 % 50 mL IVPB Q24H    dextrose 50% injection 12.5 g PRN    dextrose 50% injection 25 g PRN    docusate sodium capsule 100 mg Daily    ergocalciferol capsule 50,000 Units Q7 Days    fluconazole tablet 200 mg Daily    furosemide injection 100 mg BID    gelatin adsorbable 100cm top sponge 100 sponge 1 applicator PRN    glucagon (human recombinant) injection 1 mg PRN    glucose chewable tablet 16 g PRN    glucose chewable tablet 24 g PRN    heparin (porcine) injection 5,000 Units Q8H    insulin aspart U-100 pen 0-5 Units QID (AC + HS) PRN    insulin aspart U-100 pen 5 Units TIDWM    [START ON 8/1/2018] insulin detemir U-100 pen 9 Units Daily    levETIRAcetam tablet 500 mg BID    mycophenolate capsule 1,000 mg BID    ondansetron injection 4 mg Q8H PRN    oxyCODONE immediate release tablet 10 mg Q6H PRN    oxyCODONE immediate release tablet 5 mg Q6H PRN    pantoprazole EC tablet 40 mg BID    predniSONE tablet 20 mg Daily    sodium chloride 0.9% flush 3 mL PRN    [START ON 8/3/2018] sulfamethoxazole-trimethoprim 400-80mg per tablet 1 tablet Every Mon, Wed,  Fri    tacrolimus capsule 5 mg BID    triamcinolone acetonide 0.1% cream BID       Objective:     Vital Signs (Most Recent):  Temp: 97.8 °F (36.6 °C) (07/31/18 1130)  Pulse: 66 (07/31/18 1130)  Resp: 18 (07/31/18 1130)  BP: (!) 124/58 (07/31/18 1130)  SpO2: 96 % (07/31/18 1130)  O2 Device (Oxygen Therapy): room air (07/31/18 0745) Vital Signs (24h Range):  Temp:  [97.6 °F (36.4 °C)-98.1 °F (36.7 °C)] 97.8 °F (36.6 °C)  Pulse:  [51-85] 66  Resp:  [12-21] 18  SpO2:  [95 %-100 %] 96 %  BP: ()/(51-87) 124/58     Weight: 128.4 kg (283 lb 1.1 oz) (07/31/18 0500)  Body mass index is 36.34 kg/m².  Body surface area is 2.59 meters squared.    I/O last 3 completed shifts:  In: 2357 [P.O.:1740; I.V.:417; Other:200]  Out: 3800 [Other:3800]    Physical Exam   HENT:   Head: Atraumatic.   Neck: No JVD present.   Cardiovascular: Normal rate and regular rhythm.  Exam reveals no friction rub.    Pulmonary/Chest: Effort normal. He has rales (at bases, distant breath sounds).   Abdominal: Soft. He exhibits no distension.   Musculoskeletal: He exhibits edema.   Neurological: He is alert.   Skin: Skin is warm.

## 2018-07-31 NOTE — PLAN OF CARE
Problem: SLP Goal  Goal: SLP Goal  Bedside swallow study completed. Recommend regular diet/thin liquids at this time. No further ST recommended.   Cierra Flores CCC-SLP  7/31/2018

## 2018-07-31 NOTE — PLAN OF CARE
Recommendations    Recommendation/Intervention:   1. Continue current diet order advance texture per SLT  Dietitian will continue to monitor    Goals:  1. 85% EEN  2. Promote nutrition related labs wnl

## 2018-07-31 NOTE — PROGRESS NOTES
Pt arrived to floor via bed and mother at side. No distress noted. Will continue to monitor. Vitals are stable

## 2018-07-31 NOTE — PROGRESS NOTES
"[facility]  Endocrinology  Progress Note    Admit Date: 2018     Reason for Consult: Management of hypothermia and followed by consult on steroid induced hyperglycemia     Diabetes diagnosis year: none      HPI:   Patient is a 63 y.o. 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 Hospital course was complicated by hepatic encephalopathy. During the course, he has also had few episodes of hypothermia with temp as low as 93 F. He completed 7 day course of vancomycin for cellilitis, and is currently on vancomycin and cefepime for possible sepsis. However he continues to be hypothermic.     Further work-up of hypothermia was done, which showed TSH: 2.714, FT4: 0.53, AM cortisol: 9.9.    Interval HPI:   Overnight events: remains in TSU. BG slightly above goal overnight but trending down this AM; prandial excursions noted.   Eatin% -75%  Nausea: No  Hypoglycemia and intervention: No  Fever: No  TPN and/or TF: No    BP (!) 113/58 (BP Location: Left arm, Patient Position: Lying)   Pulse (!) 59   Temp 97.6 °F (36.4 °C) (Oral)   Resp 12   Ht 6' 2" (1.88 m)   Wt 128.4 kg (283 lb 1.1 oz)   SpO2 96%   BMI 36.34 kg/m²       Labs Reviewed and Include      Recent Labs  Lab 18  0500   *   CALCIUM 7.2*   ALBUMIN 2.8*   PROT 4.4*   *   K 4.1   CO2 20*      BUN 34*   CREATININE 2.5*   ALKPHOS 96   ALT 55*   AST 22   BILITOT 0.8     Lab Results   Component Value Date    WBC 5.88 2018    HGB 7.6 (L) 2018    HCT 22.7 (L) 2018    MCV 97 2018    PLT 52 (L) 2018     No results for input(s): TSH, FREET4 in the last 168 hours.  Lab Results   Component " Value Date    HGBA1C 5.2 07/22/2018       Nutritional status:   Body mass index is 36.34 kg/m².  Lab Results   Component Value Date    ALBUMIN 2.8 (L) 07/31/2018    ALBUMIN 2.2 (L) 07/30/2018    ALBUMIN 2.4 (L) 07/29/2018    ALBUMIN 2.4 (L) 07/29/2018     Lab Results   Component Value Date    PREALBUMIN 4 (L) 06/30/2018    PREALBUMIN 5 (L) 01/22/2018    PREALBUMIN 7 (L) 08/31/2016       Estimated Creatinine Clearance: 43.6 mL/min (A) (based on SCr of 2.5 mg/dL (H)).    Accu-Checks  Recent Labs      07/29/18   0103  07/29/18   0824  07/29/18   1145  07/29/18   1834  07/29/18   2154  07/30/18   0912  07/30/18   1329  07/30/18   1739  07/30/18   2255  07/31/18   0821   POCTGLUCOSE  224*  156*  188*  262*  239*  113*  185*  210*  206*  142*       Current Medications and/or Treatments Impacting Glycemic Control  Immunotherapy:  Immunosuppressants         Stop Route Frequency     tacrolimus capsule 5 mg      -- Oral 2 times daily     mycophenolate capsule 1,000 mg      -- Oral 2 times daily        Steroids:   Hormones     Start     Stop Route Frequency Ordered    07/29/18 0900  predniSONE tablet 20 mg  (methylprednisolone taper panel)      -- Oral Daily 07/23/18 1326        Pressors:    Autonomic Drugs     None        Hyperglycemia/Diabetes Medications: Antihyperglycemics     Start     Stop Route Frequency Ordered    07/29/18 0700  insulin detemir U-100 pen 12 Units      -- SubQ Daily 07/29/18 0649    07/28/18 1645  insulin aspart U-100 pen 4 Units      -- SubQ 3 times daily with meals 07/28/18 1202    07/27/18 1805  insulin aspart U-100 pen 0-5 Units      -- SubQ Before meals & nightly PRN 07/27/18 1705          ASSESSMENT and PLAN    * Liver transplanted      S/p transplanted.  Currently on scheduled steroid taper.  Per transplant  Avoid hypoglycemia.         Steroid-induced hyperglycemia    No previous history of DM  BG goal 140-180    Decrease levemir 9 units daily.  Increase novolog 5 units with meals.  BG monitoring  ac/hs and low dose correction scale.     Discharge:  TBD.          Coronary artery disease involving native coronary artery of native heart without angina pectoris      Avoid hypoglycemia        Long-term use of immunosuppressant medication    May increase insulin resistance.             Stage II pressure ulcer of sacral region    Optimize BG.           CKD (chronic kidney disease), stage III    Avoid insulin stacking and hypoglycemia.  Lab Results   Component Value Date    CREATININE 2.5 (H) 07/31/2018               Acute kidney injury superimposed on chronic kidney disease      Avoid insulin stacking            Alma Snyder NP  Endocrinology  [facility]

## 2018-07-31 NOTE — PROGRESS NOTES
[facility]  Nephrology  Progress Note    Patient Name: Alon Adamson  MRN: [id number]  Admission Date: 6/22/2018  Hospital Length of Stay: 39 days  Attending Provider: Jair Sheridan MD   Primary Care Physician: Mckinley Vides MD  Principal Problem:Liver transplanted    Subjective:     HPI: Mr. Adamson is 63 yr old male with decompensated alcoholic cirrhosis, CKD III and CAD admitted here on 06/22/18 admitted for bilateral LE hypervolemia and VICKY on CKD III with cr of 2.3 baseline around 1.5. Patient has multiple hospitalization in the past secondary to decompensated liver failure. Patient is currently on transplant team. During current admission patient was getting lasix and albumin intermittently for VICKY however renal function was not getting better. Patient hospital course complicated by hepatic encephalopathy with some improvement of mentation with lactulose. Also treated for cellulitis with 7 days of vancomycin.Nephrology is consulted for worsening/not improving VICKY despite lasix/albumin.     Per chart review patient has no hypotensive episodes, BP mostly above 100's. Has not received nephrotoxins such as NSAIDs/contrast media. No sever sepsis/septic shock or acute blood loss during current admit. UA with 2+ leukocytes and Hyaline casts, no wbc/rbc cast or proteinuria. Urine Na+ < 20.    Patient was transferred to ICU on 7/13/2018 after being more lethargic and hypoactive.  After two days was stepped down back to Transplant [place].       Interval History: feeling ok this morning, making urine, went at least 3-4 times this morning already, unfortunately not recorded as he goes on the pad in the bed    Review of patient's allergies indicates:   Allergen Reactions    Nsaids (non-steroidal anti-inflammatory drug)      D/t to liver disease.    Tylenol [acetaminophen]      D/t liver disease.    Penicillins Other (See Comments)     Tolerated pip-tazo in 8/2016 without issue  Tolerated  cefepime 7/2018 without issue  Since childhood was told not to take it     Current Facility-Administered Medications   Medication Frequency    0.9%  NaCl infusion PRN    0.9%  NaCl infusion Once    0.9%  NaCl infusion PRN    0.9%  NaCl infusion Once    acyclovir capsule 200 mg BID    albumin human 25% bottle 25 g Once    atorvastatin tablet 40 mg Daily    cefTRIAXone (ROCEPHIN) 2 g in dextrose 5 % 50 mL IVPB Q24H    dextrose 50% injection 12.5 g PRN    dextrose 50% injection 25 g PRN    docusate sodium capsule 100 mg Daily    ergocalciferol capsule 50,000 Units Q7 Days    fluconazole tablet 200 mg Daily    furosemide injection 100 mg BID    gelatin adsorbable 100cm top sponge 100 sponge 1 applicator PRN    glucagon (human recombinant) injection 1 mg PRN    glucose chewable tablet 16 g PRN    glucose chewable tablet 24 g PRN    heparin (porcine) injection 5,000 Units Q8H    insulin aspart U-100 pen 0-5 Units QID (AC + HS) PRN    insulin aspart U-100 pen 5 Units TIDWM    [START ON 8/1/2018] insulin detemir U-100 pen 9 Units Daily    levETIRAcetam tablet 500 mg BID    mycophenolate capsule 1,000 mg BID    ondansetron injection 4 mg Q8H PRN    oxyCODONE immediate release tablet 10 mg Q6H PRN    oxyCODONE immediate release tablet 5 mg Q6H PRN    pantoprazole EC tablet 40 mg BID    predniSONE tablet 20 mg Daily    sodium chloride 0.9% flush 3 mL PRN    [START ON 8/3/2018] sulfamethoxazole-trimethoprim 400-80mg per tablet 1 tablet Every Mon, Wed, Fri    tacrolimus capsule 5 mg BID    triamcinolone acetonide 0.1% cream BID       Objective:     Vital Signs (Most Recent):  Temp: 97.8 °F (36.6 °C) (07/31/18 1130)  Pulse: 66 (07/31/18 1130)  Resp: 18 (07/31/18 1130)  BP: (!) 124/58 (07/31/18 1130)  SpO2: 96 % (07/31/18 1130)  O2 Device (Oxygen Therapy): room air (07/31/18 0745) Vital Signs (24h Range):  Temp:  [97.6 °F (36.4 °C)-98.1 °F (36.7 °C)] 97.8 °F (36.6 °C)  Pulse:  [51-85] 66  Resp:   [12-21] 18  SpO2:  [95 %-100 %] 96 %  BP: ()/(51-87) 124/58     Weight: 128.4 kg (283 lb 1.1 oz) (07/31/18 0500)  Body mass index is 36.34 kg/m².  Body surface area is 2.59 meters squared.    I/O last 3 completed shifts:  In: 2357 [P.O.:1740; I.V.:417; Other:200]  Out: 3800 [Other:3800]    Physical Exam   HENT:   Head: Atraumatic.   Neck: No JVD present.   Cardiovascular: Normal rate and regular rhythm.  Exam reveals no friction rub.    Pulmonary/Chest: Effort normal. He has rales (at bases, distant breath sounds).   Abdominal: Soft. He exhibits no distension.   Musculoskeletal: He exhibits edema.   Neurological: He is alert.   Skin: Skin is warm.           Assessment/Plan:     Acute kidney injury superimposed on chronic kidney disease    Started on RRT, today with increasing sCr, although acid/base and lytes are fine, he is still significantly volume overloaded    - no HD yesterday, UF only, sCr has gone up, but not as much as would have been expected without any renal function, in short, may be recovering somewhat, still and likely will need further RRT    - tentatively planning for full HD session tomorrow, 3hrs  - strict Is & Os and serial RFPs  - Avoid nephrotoxic medications  - Hb > 7gm/dL            Thank you for your consult. I will follow-up with patient. Please contact us if you have any additional questions.    Elan Magana MD  Nephrology  [facility]    ATTENDING PHYSICIAN ATTESTATION  I have personally interviewed and examined the patient. I thoroughly reviewed the demographic, clinical, laboratorial and imaging information available in medical records. I agree with the assessment and recommendations provided by the subspecialty resident. Dr. Magana was under my supervision.

## 2018-07-31 NOTE — PROGRESS NOTES
Met with patient and his mother.  Patient's wife will be primary caregiver but is working this week.  Contacted patient's wife to plan education session. She will take off of work Friday to be here for education. Session will begin at noon.

## 2018-07-31 NOTE — TELEPHONE ENCOUNTER
Discussed in Liver Transplant Discussion committee.  Plan - close kidney episode for now and re-open when patient is medically stable for kidney transplantation

## 2018-07-31 NOTE — PLAN OF CARE
Problem: Physical Therapy Goal  Goal: Physical Therapy Goal  Goals to be met by: 2018    Patient will increase functional independence with mobility by performin. Supine to sit with moderate assistance - not met  2. Sit to stand with moderate assistance - not met  3. Gait x 50ft with moderate assistance with RW. - not met  4. Ascend/descend 5 stair with right Handrails with moderate assistance. - not met  5. Lower extremity exercise program x 20 reps, with supervision, in order to increase LE strength and (I) with functional mobility.               Outcome: Ongoing (interventions implemented as appropriate)  Goals remain appropriate

## 2018-07-31 NOTE — ASSESSMENT & PLAN NOTE
Avoid insulin stacking and hypoglycemia.  Lab Results   Component Value Date    CREATININE 2.5 (H) 07/31/2018

## 2018-07-31 NOTE — PROGRESS NOTES
Wound care reconsulted after patient changed floors. Patient buttocks is significantly improved. See LDA below. Patient is now getting OOB to chair at times. EHOB waffle for chair at bedside.   The legs are +4 pitting edema with hemosiderin staining and a very small open area.   Discussed care with Bebeto [place] and will place patient in coflex compression tomorrow. Patient's RUTH (Vascular studies) are documented in EPIC and within appropriate range for compression.   Patient will likely need long term compression therapy.     Recommend:  EHOB - bed and chair  q2hour turns  Barrier paste to buttocks  Coflex Kit to BLE (start weds)         07/31/18 1602       Wound 06/09/18 0100 Moisture associated dermatitis Buttocks   Date First Assessed/Time First Assessed: 06/09/18 0100   Pre-existing: Yes  Primary Wound Type: Moisture associated dermatitis  Location: Buttocks   Wound Image    Wound WDL WDL   Dressing Appearance Dry;Intact   Drainage Amount Small   Drainage Characteristics/Odor Serosanguineous   Appearance Pink;Red   Tissue loss description Partial thickness   Red (%), Wound Tissue Color 100 %   Periwound Area Intact   Wound Edges Open   Wound Length (cm) 0.5 cm   Wound Width (cm) 2 cm   Care Applied:;Skin Barrier   Dressing Foam

## 2018-07-31 NOTE — PLAN OF CARE
Problem: Patient Care Overview  Goal: Plan of Care Review  Outcome: Ongoing (interventions implemented as appropriate)  AST and ALT decreased today. Liver U/S completed. Taught mother self-meds. Prepared am meds with 2 mistakes. Reviewed blue card with mother. Verbalized understanding.  Cr=2.5 increased from 2.0 yesterday. Bactrim changed to MWF. HD completed during night. Plan for HD in am. Urine dark tea colored pt unable to void in bottle due to extreme scrotal edema. Has 4+ edema to BLE. Had 2 soft BM's today. Lungs course. Acapella ordered. Pt used IS. Reaching 1500ml. Encouraged use. PCXR done. Lasix 100mg IVP BID continues. Albumin given as ordered today. Afebrile. Rocephin continues. Glucoses monitored. Insulin given as ordered. Wound care consulted to evaluate sacral stage 2 and excoriation to buttocks as well as legs.  Taught mother how to paint incision with Betadine. Mother painted incision this afternoon correctly. Continue to let mother practice.

## 2018-07-31 NOTE — PROGRESS NOTES
ISO UF tolerated well at 3 hrs x 3.5 L ; no issues noted. Blood pressure stayed at 100-110's systolic. HD catheter flushed with PNSS and capped with microclave aseptically.

## 2018-07-31 NOTE — PROGRESS NOTES
[facility]  Liver Transplant  Progress Note    Patient Name: Alon Adamson  MRN: [id number]  Admission Date: 2018  Hospital Length of Stay: 39 days  Code Status: Full Code  Primary Care Provider: Mckinley Vides MD  Post-Operative Day: 8    ORGAN:   LIVER  Disease Etiology: Alcoholic Cirrhosis  Donor Type:    - Brain Death  CDC High Risk:   No  Donor CMV Status:   Donor CMV Status: Negative  Donor HBcAB:   Negative  Donor HCV Status:   Negative  Donor HBV TAMAR: Negative  Donor HCV TAMAR: Negative  Whole or Partial: Whole Liver  Biliary Anastomosis: End to End  Arterial Anatomy: Standard  Subjective:     History of Present Illness:  Mr. Adamson is a 64 yo M with PMH ESLD secondary to ETOH cirrhosis, CKD3, CAD.  Complications of liver disease include hyperbilirubinemia, hypoalbuminemia, severe malnutrition,   hepatic encephalopathy, thrombocytopenia, splenomegaly, ascites, hypervolemia, coagulopathy, portal HTN.  Pt admitted  for acutely worsening hepatic encephalopathy and concern for BLE cellulitis as well as severe volume overload.  Complications of ELSD managed while inpt and he received liver offer on 18.  Pt underwent liver transplant without complication: steroid induction, DBD, CMV -/-.  Donor with E coli bacteremia resistant to cipro placed on Rocephin x 2 weeks per ID recs.  Also on fluconazole x 30 days as pt very ill prior to transplant. Post transplant, with nice trend down of AST/ALT and LFTs.  Liver U/S with elevated RIs and decreased HAV.  During initial post operative period, pt required pressor support.  Pt with expected post operative VICKY as with VICKY prior to surgery.  Nephrology consulted and pt placed on intermittent CRRT.  Pressor requirement decreased and pt tolerated CRRT without pressors.  Pt with asymptomatic bradycardia while in ICU prompting cardiology consult. Pt without need for acute intervention but atropine placed at bedside.  HR has improved POD  7 and 8.  He was transferred to the TSU 7/30 PM.          Hospital Course:  Interval hx: Pt feeling ok today.  Repeat follow up liver U/S pending.  Difficult to assess UOP as pt with severe scrotal edema.  Continue lasix.  Albumin x 1 today.  HD 7/30 PM- tolerated well though with some hypotension.  With coarse BS- acapella ordered.  Satting well on RA.  HR improved to high 50s-60s- previously low in ICU. Eating well, supplements ordered for severe malnutrition.  Working with PT/OT- with severe deconditioning.      Scheduled Meds:   sodium chloride 0.9%   Intravenous Once    sodium chloride 0.9%   Intravenous Once    acyclovir  200 mg Oral BID    albumin human 25%  25 g Intravenous Once    atorvastatin  40 mg Oral Daily    cefTRIAXone (ROCEPHIN) IVPB  2 g Intravenous Q24H    docusate sodium  100 mg Oral Daily    ergocalciferol  50,000 Units Oral Q7 Days    fluconazole  200 mg Oral Daily    furosemide  100 mg Intravenous BID    heparin (porcine)  5,000 Units Subcutaneous Q8H    insulin aspart U-100  5 Units Subcutaneous TIDWM    [START ON 8/1/2018] insulin detemir U-100  9 Units Subcutaneous Daily    levETIRAcetam  500 mg Oral BID    mycophenolate  1,000 mg Oral BID    pantoprazole  40 mg Oral BID    predniSONE  20 mg Oral Daily    [START ON 8/3/2018] sulfamethoxazole-trimethoprim 400-80mg  1 tablet Oral Every Mon, Wed, Fri    tacrolimus  5 mg Oral BID    triamcinolone acetonide 0.1%   Topical (Top) BID     Continuous Infusions:  PRN Meds:sodium chloride 0.9%, sodium chloride 0.9%, dextrose 50%, dextrose 50%, gelatin adsorbable 100cm top sponge, glucagon (human recombinant), glucose, glucose, insulin aspart U-100, ondansetron, oxyCODONE, oxyCODONE, sodium chloride 0.9%    Review of Systems   Constitutional: Positive for activity change, appetite change and fatigue. Negative for chills, diaphoresis and fever.   Respiratory: Positive for cough. Negative for shortness of breath.    Cardiovascular:  Positive for leg swelling. Negative for chest pain and palpitations.   Gastrointestinal: Positive for abdominal distention. Negative for abdominal pain, constipation, diarrhea, nausea and vomiting.   Genitourinary: Positive for decreased urine volume.   Musculoskeletal: Negative for back pain.   Neurological: Positive for weakness. Negative for tremors and headaches.   Psychiatric/Behavioral: Negative for dysphoric mood and sleep disturbance. The patient is not nervous/anxious.      Objective:     Vital Signs (Most Recent):  Temp: 97.8 °F (36.6 °C) (07/31/18 1130)  Pulse: 61 (07/31/18 1504)  Resp: 18 (07/31/18 1130)  BP: (!) 124/58 (07/31/18 1130)  SpO2: 96 % (07/31/18 1130) Vital Signs (24h Range):  Temp:  [97.6 °F (36.4 °C)-98 °F (36.7 °C)] 97.8 °F (36.6 °C)  Pulse:  [51-85] 61  Resp:  [12-20] 18  SpO2:  [95 %-100 %] 96 %  BP: ()/(51-87) 124/58     Weight: 128.4 kg (283 lb 1.1 oz)  Body mass index is 36.34 kg/m².    Intake/Output - Last 3 Shifts       07/29 0700 - 07/30 0659 07/30 0700 - 07/31 0659 07/31 0700 - 08/01 0659    P.O. 720 1260 420    I.V. (mL/kg) 163 (1.3) 254 (2)     Other  200     Total Intake(mL/kg) 883 (6.9) 1714 (13.3) 420 (3.3)    Urine (mL/kg/hr)  0 (0)     Other  3800 (1.2)     Total Output   3800      Net +883 -2086 +420           Urine Occurrence 3 x 1 x 2 x    Stool Occurrence 1 x 1 x 2 x          Physical Exam   Constitutional: He is oriented to person, place, and time. No distress.   Temporal and distal extremity muscle wasting   HENT:   Head: Normocephalic and atraumatic.   Eyes: No scleral icterus.   Cardiovascular: Normal rate, regular rhythm and normal heart sounds.    Pulmonary/Chest: Effort normal and breath sounds normal.   Abdominal: Soft. Bowel sounds are normal. He exhibits distension (ascites). There is no tenderness.   - [employer] cdi no ssi   Musculoskeletal: He exhibits edema (3+ BLE).   Neurological: He is alert and oriented to person, place, and time.   Skin: Skin  is warm and dry. He is not diaphoretic.   Psychiatric: He has a normal mood and affect. His behavior is normal. Judgment and thought content normal.   Nursing note and vitals reviewed.      Laboratory:  Immunosuppressants         Stop Route Frequency     tacrolimus capsule 5 mg      -- Oral 2 times daily     mycophenolate capsule 1,000 mg      -- Oral 2 times daily        CBC:   Recent Labs  Lab 07/31/18  0500   WBC 5.88   RBC 2.34*   HGB 7.6*   HCT 22.7*   PLT 52*   MCV 97   MCH 32.5*   MCHC 33.5     CMP:   Recent Labs  Lab 07/31/18  0500   *   CALCIUM 7.2*   ALBUMIN 2.8*   PROT 4.4*   *   K 4.1   CO2 20*      BUN 34*   CREATININE 2.5*   ALKPHOS 96   ALT 55*   AST 22   BILITOT 0.8     Coagulation:   Recent Labs  Lab 07/31/18  0500   INR 1.2   APTT 28.3     Labs within the past 24 hours have been reviewed.    Diagnostic Results:  I have personally reviewed all pertinent imaging studies.    Assessment/Plan:     * Liver transplanted    - LFTs/enzymes improved  - POD 1 U/s with elevated RIs  - Repeat liver U/S pending          At risk for opportunistic infections    - Continue Bactrim, acyclovir, nystatin          Sinus bradycardia    - Improved  - Atropine at bedside if HR decreases again          Positive blood culture in cadaveric donor    - E coli bacteremia in donor resistant to Cipro  - Rocephin x 14 days --> start date 7/23        Steroid-induced hyperglycemia    - Endocrine consulted          At risk for infection transmitted from donor              CKD (chronic kidney disease), stage III    See VICKY.          Severe protein-calorie malnutrition    - Dietary consulted  - Continue supplements  - Eating well        Physical deconditioning    - PT/OT consulted        Ascites due to alcoholic cirrhosis    - Para 6/25 negative for infection   - Para 7/10 negative for infection. Monitor.           Hypoalbuminemia due to protein-calorie malnutrition    - see severe protein-calorie malnutrition          Pancytopenia    - Related to decompensated liver disease --> should improve now that pt transplanted        Acute kidney injury superimposed on chronic kidney disease    - With CKD3   - Expected VICKY post txp  - UOP unclear as pt unable to measure urine (severe scrotal edema)  - Tolerated HD 7/30 PM  - Nephrology on board: placed pt on lasix 100 mg IV BID  - Albumin 25% 100 cc x 1 today        Coronary artery disease involving native coronary artery of native heart without angina pectoris    - Continue home ASA.   - 2D Echo 7/9 normal EF (55-60%), trivial tricuspid/mitral regurg.        Anemia of chronic disease    - H&H stable --> monitor        Thrombocytopenia    - Secondary to splenomegaly from portal HTN- should improve with txp.  - No s/s overt bleed.        Seizures    - Continue oral Keppra  - Keep Mg > 2        Bilateral edema of lower extremity    - Albumin 25% 100 cc x 1 + lasix IV BID  - Continue intermittent dialysis            VTE Risk Mitigation         Ordered     heparin (porcine) injection 5,000 Units  Every 8 hours      07/26/18 1330     Place sequential compression device  Until discontinued      07/23/18 1326     IP VTE HIGH RISK PATIENT  Once      07/23/18 1242          The patients clinical status was discussed at multidisplinary rounds, involving transplant surgery, transplant medicine, pharmacy, nursing, nutrition, and social work    Discharge Planning:  Not a candidate for discharge    Bebeto Virgen PA-C  Liver Transplant  [facility]

## 2018-07-31 NOTE — PT/OT/SLP PROGRESS
"Physical Therapy Treatment    Patient Name:  Alon Adamson   MRN:  [id number]    Recommendations:     Discharge Recommendations:  nursing facility, skilled   Discharge Equipment Recommendations:  (TBD)   Barriers to discharge: Decreased caregiver support    Assessment:     Alon Adamson is a 63 y.o. male admitted with a medical diagnosis of Liver transplanted.  He presents with the following impairments/functional limitations:  weakness, impaired endurance, impaired self care skills, impaired functional mobilty, gait instability, impaired balance, decreased upper extremity function, decreased lower extremity function Pt tolerated therex well, however limited by fatigue, pt would continue to benefit from skilled PT services to improve overall functional mobility, strength and endurance.  .    Rehab Prognosis:  good; patient would benefit from acute skilled PT services to address these deficits and reach maximum level of function.      Recent Surgery: Procedure(s) (LRB):  TRANSPLANT, LIVER (N/A) 8 Days Post-Op    Plan:     During this hospitalization, patient to be seen 4 x/week to address the above listed problems via gait training, therapeutic activities, therapeutic exercises  · Plan of Care Expires:  08/26/18   Plan of Care Reviewed with: patient, mother    Subjective     Communicated with Sweta prior to session.  Patient found supine upon PT entry to room, agreeable to treatment.   "not too much, didn't sleep, Dialysis 4 hrs last night, hoping for a few winks before lunch and going for some test" agreeable to supine therex    Chief Complaint: fatigue/weakness  Patient comments/goals: get stronger  Pain/Comfort:  · Pain Rating 1: 0/10  · Pain Rating Post-Intervention 1: 0/10    Patients cultural, spiritual, Quaker conflicts given the current situation: none reported     Objective:     Patient found with:  (all line intact)     General Precautions: Standard, fall   Orthopedic Precautions:N/A "   Braces: N/A     AM-PAC 6 CLICK MOBILITY  Turning over in bed (including adjusting bedclothes, sheets and blankets)?: 2  Sitting down on and standing up from a chair with arms (e.g., wheelchair, bedside commode, etc.): 2  Moving from lying on back to sitting on the side of the bed?: 2  Moving to and from a bed to a chair (including a wheelchair)?: 1  Need to walk in hospital room?: 1  Climbing 3-5 steps with a railing?: 1  Basic Mobility Total Score: 9       Therapeutic Activities and Exercises:   x 10 reps A/AA as needed AP,GS,QS,HS,SAQ,abd/add    Patient left supine with all lines intact, call button in reach, mother present and belongings in reach..    GOALS:    Physical Therapy Goals        Problem: Physical Therapy Goal    Goal Priority Disciplines Outcome Goal Variances Interventions   Physical Therapy Goal     PT/OT, PT Ongoing (interventions implemented as appropriate)     Description:  Goals to be met by: 2018    Patient will increase functional independence with mobility by performin. Supine to sit with moderate assistance - not met  2. Sit to stand with moderate assistance - not met  3. Gait x 50ft with moderate assistance with RW. - not met  4. Ascend/descend 5 stair with right Handrails with moderate assistance. - not met  5. Lower extremity exercise program x 20 reps, with supervision, in order to increase LE strength and (I) with functional mobility.                                Time Tracking:     PT Received On: 18  PT Start Time: 1101     PT Stop Time: 1121  PT Total Time (min): 20 min     Billable Minutes: Therapeutic Exercise 20    Treatment Type: Treatment  PT/PTA: PTA     PTA Visit Number: 1     Brittney Diego PTA  2018

## 2018-07-31 NOTE — SUBJECTIVE & OBJECTIVE
"Interval HPI:   Overnight events: remains in TSU. BG slightly above goal overnight but trending down this AM; prandial excursions noted.   Eatin% -75%  Nausea: No  Hypoglycemia and intervention: No  Fever: No  TPN and/or TF: No    BP (!) 113/58 (BP Location: Left arm, Patient Position: Lying)   Pulse (!) 59   Temp 97.6 °F (36.4 °C) (Oral)   Resp 12   Ht 6' 2" (1.88 m)   Wt 128.4 kg (283 lb 1.1 oz)   SpO2 96%   BMI 36.34 kg/m²     Labs Reviewed and Include      Recent Labs  Lab 18  0500   *   CALCIUM 7.2*   ALBUMIN 2.8*   PROT 4.4*   *   K 4.1   CO2 20*      BUN 34*   CREATININE 2.5*   ALKPHOS 96   ALT 55*   AST 22   BILITOT 0.8     Lab Results   Component Value Date    WBC 5.88 2018    HGB 7.6 (L) 2018    HCT 22.7 (L) 2018    MCV 97 2018    PLT 52 (L) 2018     No results for input(s): TSH, FREET4 in the last 168 hours.  Lab Results   Component Value Date    HGBA1C 5.2 2018       Nutritional status:   Body mass index is 36.34 kg/m².  Lab Results   Component Value Date    ALBUMIN 2.8 (L) 2018    ALBUMIN 2.2 (L) 2018    ALBUMIN 2.4 (L) 2018    ALBUMIN 2.4 (L) 2018     Lab Results   Component Value Date    PREALBUMIN 4 (L) 2018    PREALBUMIN 5 (L) 2018    PREALBUMIN 7 (L) 2016       Estimated Creatinine Clearance: 43.6 mL/min (A) (based on SCr of 2.5 mg/dL (H)).    Accu-Checks  Recent Labs      18   0103  18   0824  18   1145  18   1834  18   2154  18   0912  18   1329  18   1739  18   2255  18   0821   POCTGLUCOSE  224*  156*  188*  262*  239*  113*  185*  210*  206*  142*       Current Medications and/or Treatments Impacting Glycemic Control  Immunotherapy:  Immunosuppressants         Stop Route Frequency     tacrolimus capsule 5 mg      -- Oral 2 times daily     mycophenolate capsule 1,000 mg      -- Oral 2 times daily        Steroids: "   Hormones     Start     Stop Route Frequency Ordered    07/29/18 0900  predniSONE tablet 20 mg  (methylprednisolone taper panel)      -- Oral Daily 07/23/18 1326        Pressors:    Autonomic Drugs     None        Hyperglycemia/Diabetes Medications: Antihyperglycemics     Start     Stop Route Frequency Ordered    07/29/18 0700  insulin detemir U-100 pen 12 Units      -- SubQ Daily 07/29/18 0649    07/28/18 1645  insulin aspart U-100 pen 4 Units      -- SubQ 3 times daily with meals 07/28/18 1202    07/27/18 1805  insulin aspart U-100 pen 0-5 Units      -- SubQ Before meals & nightly PRN 07/27/18 1705

## 2018-07-31 NOTE — PT/OT/SLP EVAL
"Speech Language Pathology Evaluation  Bedside Swallow  Discharge    Patient Name:  Alon Adamson   MRN:  [id number]  Admitting Diagnosis: Liver transplanted    Recommendations:                 General Recommendations:  Follow-up not indicated  Diet recommendations:  Regular, Thin   Aspiration Precautions: Standard aspiration precautions   General Precautions: Standard, fall  Communication strategies:  none    History:     Past Medical History:   Diagnosis Date    Anticoagulant long-term use     Arthritis     Back pain     Cellulitis     Cirrhosis     Coronary artery disease     DM (diabetes mellitus)     Hearing loss     right ear    Hepatic encephalopathy     Hypertension     diagnosed today (11/25/2015) and prescribed toprol    Leg edema     Nephrolithiasis     JACK (obstructive sleep apnea)     Seizures     Splenomegaly        Past Surgical History:   Procedure Laterality Date    APPENDECTOMY      Open    BACK SURGERY      CHOLECYSTECTOMY      laprascopic    COLONOSCOPY N/A 1/25/2018    Procedure: COLONOSCOPY;  Surgeon: Lashawn Myles MD;  Location: [facility] ([address] [place] [address]);  Service: Endoscopy;  Laterality: N/A;    LIVER TRANSPLANT N/A 7/23/2018    Procedure: TRANSPLANT, LIVER;  Surgeon: Alma Joyce MD;  Location: [facility] OR [address] [place] [address];  Service: Transplant;  Laterality: N/A;    LUMBAR DISCECTOMY      SPLENIC ARTERY EMBOLIZATION  04/08/2016    Dr Taveras    TONSILLECTOMY         Social History: Patient lives with spouse.      Chest X-Rays: 7/31 Right central venous catheter and vascular sheath remain.  Heart size and pulmonary vessels are similar.  Continued ground-glass opacity at the right base likely layering of pleural fluid.  Left lung is clear.    Prior diet: Regular/thin.      Subjective     Awake/alert  " I'm swallowing just fine."     Pain/Comfort:  · Pain Rating 1: 0/10  · Pain Rating Post-Intervention 1: 0/10    Objective:     Oral Musculature Evaluation  · Oral Musculature: " WFL  · Dentition: scattered dentition  · Mucosal Quality: good  · Mandibular Strength and Mobility: WFL  · Oral Labial Strength and Mobility: WFL  · Volitional Cough: weak  · Volitional Swallow: timely  · Voice Prior to PO Intake: clear    Bedside Swallow Eval:   Consistencies Assessed:  · Thin liquids x3 straw  · Soft solids x3  · Solids x2     Oral Phase:   · WFL    Pharyngeal Phase:   · no overt clinical signs/symptoms of aspiration      Assessment:     Alon Adamson is a 63 y.o. male with oral and pharyngeal phases of swallow deemed wFL. No ST f/u  Goals:    SLP Goals        Problem: SLP Goal    Goal Priority Disciplines Outcome   SLP Goal     SLP                    Plan:     · Plan of Care reviewed with:  patient, family   · SLP Follow-Up:  No       Discharge recommendations:    No ST f/u      Time Tracking:     SLP Treatment Date:   07/31/18  Speech Start Time:  1250  Speech Stop Time:  1258     Speech Total Time (min):  8 min    Billable Minutes: Eval Swallow and Oral Function 8    Cierra Flores CCC-SLP  07/31/2018

## 2018-07-31 NOTE — ASSESSMENT & PLAN NOTE
No previous history of DM  BG goal 140-180    Decrease levemir 9 units daily.  Increase novolog 5 units with meals.  BG monitoring ac/hs and low dose correction scale.     Discharge:  TBD.

## 2018-07-31 NOTE — PROGRESS NOTES
[facility]  Adult Nutrition  Progress Note    SUMMARY       Recommendations    Recommendation/Intervention:   1. Continue current diet order advance texture per SLT  Dietitian will continue to monitor    Goals:  1. 85% EEN  2. Promote nutrition related labs wnl    Comments: Pt fair appetite 50-75% of meals. Early satiety. Denies N/V/D.  Stable wt. Post transplant education completed.    Nutrition Discharge Planning: Post transplant nutrition education provided. Food safety/drug interactions emphasized. General healthy diet recommended. Dietitian name/contact information, education material left.  No other needs identified. Caregiver present.     Nutrition Goal Status: progressing towards goal  Communication of RD Recs:  (POC)    Reason for Assessment  Reason for Assessment: RD follow-up  Diagnosis: transplant/postoperative complications  1. Acute kidney injury superimposed on chronic kidney disease    2. Bilateral edema of lower extremity    3. Ascites due to alcoholic cirrhosis    4. Cellulitis of both lower extremities    5. Cholestatic pruritus    6. Hepatic encephalopathy    7. Physical deconditioning    8. Thrombocytopenia    9. Coagulopathy    10. Decompensated hepatic cirrhosis    11. Hypoalbuminemia due to protein-calorie malnutrition    12. Seizures    13. Chest pain    14. Elevated troponin    15. Coronary artery disease involving native coronary artery of native heart without angina pectoris    16. Gram-positive bacteremia    17. Anemia of chronic disease    18. CKD (chronic kidney disease), stage III    19. Dermatitis associated with moisture    20. Pancytopenia    21. Hypervolemia    22. Hypernatremia    23. Severe protein-calorie malnutrition    24. Hypothermia, subsequent encounter    25. Acidosis    26. Nausea and vomiting, intractability of vomiting not specified, unspecified vomiting type    27. Bradycardia    28. Liver transplanted    29. At risk for opportunistic infections   "  30. Prophylactic immunotherapy    31. Long-term use of immunosuppressant medication      Medical History:    Anticoagulant long-term use     Arthritis     Back pain     Cellulitis     Cirrhosis     Coronary artery disease     DM (diabetes mellitus)     Hearing loss     right ear    Hepatic encephalopathy     Hypertension     diagnosed today (11/25/2015) and prescribed toprol    Leg edema     Nephrolithiasis     JACK (obstructive sleep apnea)     Seizures     Splenomegaly      Interdisciplinary Rounds: did not attend    Nutrition Risk Screen  Nutrition Risk Screen: dysphagia or difficulty swallowing    Nutrition/Diet History  Patient Reported Diet/Restrictions/Preferences: low salt.  Do you have any cultural, spiritual, Restorationist conflicts, given your current situation?: none  Food Allergies: NKFA  Factors Affecting Nutritional Intake: early satiety    Anthropometrics  Temp: 98.1 °F (36.7 °C)  Height: 6' 2" (188 cm)  Height (inches): 74 in  Weight Method: Bed Scale  Weight: 128.4 kg (283 lb 1.1 oz)  Weight (lb): 283.07 lb  Ideal Body Weight (IBW), Male: 190 lb  % Ideal Body Weight, Male (lb): 148.98 lb  BMI (Calculated): 36.4  BMI Grade: 35 - 39.9 - obesity - grade II  Usual Body Weight (UBW), kg: (!) 139 kg (admit weight - 6/23)  % Usual Body Weight: 91.05  % Weight Change From Usual Weight: -9.14 %     Lab/Procedures/Meds  Pertinent Labs Reviewed: reviewed    (L) 07/31/2018    K 4.1 07/31/2018     07/31/2018    CREATININE 2.5 (H) 07/31/2018    BUN 34 (H) 07/31/2018    HGBA1C 5.2 07/22/2018     Pertinent Medications Reviewed: reviewed  Scheduled Meds:   sodium chloride 0.9%   Intravenous Once    sodium chloride 0.9%   Intravenous Once    acyclovir  200 mg Oral BID    atorvastatin  40 mg Oral Daily    cefTRIAXone (ROCEPHIN) IVPB  2 g Intravenous Q24H    docusate sodium  100 mg Oral Daily    ergocalciferol  50,000 Units Oral Q7 Days    fluconazole  200 mg Oral Daily    furosemide  " 100 mg Intravenous BID    heparin (porcine)  5,000 Units Subcutaneous Q8H    insulin aspart U-100  5 Units Subcutaneous TIDWM    [START ON 8/1/2018] insulin detemir U-100  9 Units Subcutaneous Daily    levETIRAcetam  500 mg Oral BID    mycophenolate  1,000 mg Oral BID    pantoprazole  40 mg Oral BID    predniSONE  20 mg Oral Daily    [START ON 8/3/2018] sulfamethoxazole-trimethoprim 400-80mg  1 tablet Oral Every Mon, Wed, Fri    tacrolimus  5 mg Oral BID    triamcinolone acetonide 0.1%   Topical (Top) BID     Physical Findings/Assessment  Overall Physical Appearance: obese  Tubes:  (-)  Oral/Mouth Cavity: tooth/teeth missing  Skin: incision(s)    Estimated/Assessed Needs  Weight Used For Calorie Calculations: 86.4 kg (190 lb 6.2 oz) (IBW)  Energy Calorie Requirements (kcal): 2813-8052 (25-30 kcal/kg)  Energy Need Method: [place]  Protein Requirements:  (1.0-1.2 gm/kg)  Weight Used For Protein Calculations: 86.4 kg (190 lb 6.2 oz) (IBW)  Fluid Requirements (mL): 2159 (1 ml/kcal or per team)  Fluid Need Method: RDA Method  RDA Method (mL): 2159     Nutrition Prescription Ordered  Current Diet Order: Dysphagia, pureed  Oral Nutrition Supplement: Boost Glucose Control, Beneprotein    Evaluation of Received Nutrient/Fluid Intake  Energy Calories Required: meeting needs  Protein Required: meeting needs  % Intake of Estimated Energy Needs: 75 - 100 %  % Meal Intake: 50 - 75 %    Nutrition Risk  Level of Risk/Frequency of Follow-up: low     Assessment and Plan    Severe protein-calorie malnutrition     Contributing Nutrition Diagnosis  Malnutrition     Related to (etiology):   Chronic Illness/Injury     Signs and Symptoms (as evidenced by):  Energy Intake: <50% of estimated energy requirement for 1 month  Weight Loss: 9% x 1 month   Fluid Accumulation: moderate     Interventions/Recommendations (treatment strategy):  Full assessment completed, see RD Note 7/31/2018.     Nutrition Diagnosis  Status:  Continues      Monitor and Evaluation  Food and Nutrient Intake: food and beverage intake, energy intake  Food and Nutrient Adminstration: diet order  Knowledge/Beliefs/Attitudes: food and nutrition knowledge/skill  Physical Activity and Function: nutrition-related ADLs and IADLs  Anthropometric Measurements: weight, weight change  Biochemical Data, Medical Tests and Procedures: electrolyte and renal panel, gastrointestinal profile, glucose/endocrine profile, inflammatory profile, lipid profile  Nutrition-Focused Physical Findings: overall appearance     Nutrition Follow-Up    RD Follow-up?: Yes

## 2018-07-31 NOTE — NURSING TRANSFER
Nursing Transfer Note      7/30/2018     Transfer To: 8075    Transfer via bed    Transfer with cardiac monitoring    Transported by 2 RNs    Medicines sent: yes    Chart send with patient: Yes    Notified: Mother at bedside    Patient reassessed at: 7/30/2018 18:20     Upon arrival to floor: patient oriented to room, call bell in reach and bed in lowest position. Pt tolerated transfer. Belongings sent, report given to Ning.

## 2018-07-31 NOTE — SUBJECTIVE & OBJECTIVE
Scheduled Meds:   sodium chloride 0.9%   Intravenous Once    sodium chloride 0.9%   Intravenous Once    acyclovir  200 mg Oral BID    albumin human 25%  25 g Intravenous Once    atorvastatin  40 mg Oral Daily    cefTRIAXone (ROCEPHIN) IVPB  2 g Intravenous Q24H    docusate sodium  100 mg Oral Daily    ergocalciferol  50,000 Units Oral Q7 Days    fluconazole  200 mg Oral Daily    furosemide  100 mg Intravenous BID    heparin (porcine)  5,000 Units Subcutaneous Q8H    insulin aspart U-100  5 Units Subcutaneous TIDWM    [START ON 8/1/2018] insulin detemir U-100  9 Units Subcutaneous Daily    levETIRAcetam  500 mg Oral BID    mycophenolate  1,000 mg Oral BID    pantoprazole  40 mg Oral BID    predniSONE  20 mg Oral Daily    [START ON 8/3/2018] sulfamethoxazole-trimethoprim 400-80mg  1 tablet Oral Every Mon, Wed, Fri    tacrolimus  5 mg Oral BID    triamcinolone acetonide 0.1%   Topical (Top) BID     Continuous Infusions:  PRN Meds:sodium chloride 0.9%, sodium chloride 0.9%, dextrose 50%, dextrose 50%, gelatin adsorbable 100cm top sponge, glucagon (human recombinant), glucose, glucose, insulin aspart U-100, ondansetron, oxyCODONE, oxyCODONE, sodium chloride 0.9%    Review of Systems   Constitutional: Positive for activity change, appetite change and fatigue. Negative for chills, diaphoresis and fever.   Respiratory: Positive for cough. Negative for shortness of breath.    Cardiovascular: Positive for leg swelling. Negative for chest pain and palpitations.   Gastrointestinal: Positive for abdominal distention. Negative for abdominal pain, constipation, diarrhea, nausea and vomiting.   Genitourinary: Positive for decreased urine volume.   Musculoskeletal: Negative for back pain.   Neurological: Positive for weakness. Negative for tremors and headaches.   Psychiatric/Behavioral: Negative for dysphoric mood and sleep disturbance. The patient is not nervous/anxious.      Objective:     Vital Signs (Most  Recent):  Temp: 97.8 °F (36.6 °C) (07/31/18 1130)  Pulse: 61 (07/31/18 1504)  Resp: 18 (07/31/18 1130)  BP: (!) 124/58 (07/31/18 1130)  SpO2: 96 % (07/31/18 1130) Vital Signs (24h Range):  Temp:  [97.6 °F (36.4 °C)-98 °F (36.7 °C)] 97.8 °F (36.6 °C)  Pulse:  [51-85] 61  Resp:  [12-20] 18  SpO2:  [95 %-100 %] 96 %  BP: ()/(51-87) 124/58     Weight: 128.4 kg (283 lb 1.1 oz)  Body mass index is 36.34 kg/m².    Intake/Output - Last 3 Shifts       07/29 0700 - 07/30 0659 07/30 0700 - 07/31 0659 07/31 0700 - 08/01 0659    P.O. 720 1260 420    I.V. (mL/kg) 163 (1.3) 254 (2)     Other  200     Total Intake(mL/kg) 883 (6.9) 1714 (13.3) 420 (3.3)    Urine (mL/kg/hr)  0 (0)     Other  3800 (1.2)     Total Output   3800      Net +883 -2086 +420           Urine Occurrence 3 x 1 x 2 x    Stool Occurrence 1 x 1 x 2 x          Physical Exam   Constitutional: He is oriented to person, place, and time. No distress.   Temporal and distal extremity muscle wasting   HENT:   Head: Normocephalic and atraumatic.   Eyes: No scleral icterus.   Cardiovascular: Normal rate, regular rhythm and normal heart sounds.    Pulmonary/Chest: Effort normal and breath sounds normal.   Abdominal: Soft. Bowel sounds are normal. He exhibits distension (ascites). There is no tenderness.   - [employer] cdi no ssi   Musculoskeletal: He exhibits edema (3+ BLE).   Neurological: He is alert and oriented to person, place, and time.   Skin: Skin is warm and dry. He is not diaphoretic.   Psychiatric: He has a normal mood and affect. His behavior is normal. Judgment and thought content normal.   Nursing note and vitals reviewed.      Laboratory:  Immunosuppressants         Stop Route Frequency     tacrolimus capsule 5 mg      -- Oral 2 times daily     mycophenolate capsule 1,000 mg      -- Oral 2 times daily        CBC:   Recent Labs  Lab 07/31/18  0500   WBC 5.88   RBC 2.34*   HGB 7.6*   HCT 22.7*   PLT 52*   MCV 97   MCH 32.5*   MCHC 33.5     CMP:   Recent  Labs  Lab 07/31/18  0500   *   CALCIUM 7.2*   ALBUMIN 2.8*   PROT 4.4*   *   K 4.1   CO2 20*      BUN 34*   CREATININE 2.5*   ALKPHOS 96   ALT 55*   AST 22   BILITOT 0.8     Coagulation:   Recent Labs  Lab 07/31/18  0500   INR 1.2   APTT 28.3     Labs within the past 24 hours have been reviewed.    Diagnostic Results:  I have personally reviewed all pertinent imaging studies.

## 2018-07-31 NOTE — PLAN OF CARE
Pt AAOx4, VSS, afebrile. Pt denies pain and n/v. [employer] incision open to air, staples intact, CDI.  IV albumin given.  Pt's scrotum is very swollen.  Self-med box made.  Fall risk precautions initiated.  Pt in lowest bed position setting, lighting adjusted, side rails up x2.  Pt remain free from falls during shift.  Mother to remain at bedside. Call light within reach. Will continue to monitor.

## 2018-08-01 PROBLEM — R00.1 BRADYCARDIA: Status: ACTIVE | Noted: 2018-08-01

## 2018-08-01 PROBLEM — E87.70 HYPERVOLEMIA: Status: ACTIVE | Noted: 2018-08-01

## 2018-08-01 LAB
ABO + RH BLD: NORMAL
ALBUMIN SERPL BCP-MCNC: 2.5 G/DL
ALP SERPL-CCNC: 77 U/L
ALT SERPL W/O P-5'-P-CCNC: 39 U/L
ANION GAP SERPL CALC-SCNC: 6 MMOL/L
AST SERPL-CCNC: 14 U/L
BASOPHILS # BLD AUTO: 0 K/UL
BASOPHILS NFR BLD: 0 %
BILIRUB SERPL-MCNC: 0.5 MG/DL
BLD GP AB SCN CELLS X3 SERPL QL: NORMAL
BLD PROD TYP BPU: NORMAL
BLOOD UNIT EXPIRATION DATE: NORMAL
BLOOD UNIT TYPE CODE: 6200
BLOOD UNIT TYPE: NORMAL
BUN SERPL-MCNC: 42 MG/DL
CA-I BLDV-SCNC: 1.06 MMOL/L
CALCIUM SERPL-MCNC: 7.2 MG/DL
CHLORIDE SERPL-SCNC: 106 MMOL/L
CO2 SERPL-SCNC: 19 MMOL/L
CODING SYSTEM: NORMAL
CREAT SERPL-MCNC: 3 MG/DL
DIFFERENTIAL METHOD: ABNORMAL
DISPENSE STATUS: NORMAL
EOSINOPHIL # BLD AUTO: 0.1 K/UL
EOSINOPHIL NFR BLD: 2.4 %
ERYTHROCYTE [DISTWIDTH] IN BLOOD BY AUTOMATED COUNT: 17 %
EST. GFR  (AFRICAN AMERICAN): 24.6 ML/MIN/1.73 M^2
EST. GFR  (NON AFRICAN AMERICAN): 21.3 ML/MIN/1.73 M^2
GLUCOSE SERPL-MCNC: 129 MG/DL
HCT VFR BLD AUTO: 20.3 %
HCT VFR BLD AUTO: 20.8 %
HGB BLD-MCNC: 6.6 G/DL
HGB BLD-MCNC: 6.6 G/DL
IMM GRANULOCYTES # BLD AUTO: 0.04 K/UL
IMM GRANULOCYTES NFR BLD AUTO: 1.1 %
LYMPHOCYTES # BLD AUTO: 0.5 K/UL
LYMPHOCYTES NFR BLD: 13.9 %
MAGNESIUM SERPL-MCNC: 2.1 MG/DL
MCH RBC QN AUTO: 31.7 PG
MCHC RBC AUTO-ENTMCNC: 32.5 G/DL
MCV RBC AUTO: 98 FL
MONOCYTES # BLD AUTO: 0.4 K/UL
MONOCYTES NFR BLD: 10.4 %
NEUTROPHILS # BLD AUTO: 2.7 K/UL
NEUTROPHILS NFR BLD: 72.2 %
NRBC BLD-RTO: 0 /100 WBC
PHOSPHATE SERPL-MCNC: 3.7 MG/DL
PLATELET # BLD AUTO: 54 K/UL
PMV BLD AUTO: 12.8 FL
POCT GLUCOSE: 139 MG/DL (ref 70–110)
POCT GLUCOSE: 144 MG/DL (ref 70–110)
POCT GLUCOSE: 189 MG/DL (ref 70–110)
POTASSIUM SERPL-SCNC: 4.4 MMOL/L
PROT SERPL-MCNC: 4 G/DL
RBC # BLD AUTO: 2.08 M/UL
SODIUM SERPL-SCNC: 131 MMOL/L
TACROLIMUS BLD-MCNC: 7 NG/ML
TRANS ERYTHROCYTES VOL PATIENT: NORMAL ML
WBC # BLD AUTO: 3.75 K/UL

## 2018-08-01 PROCEDURE — 20600001 HC STEP DOWN PRIVATE ROOM

## 2018-08-01 PROCEDURE — 63600175 PHARM REV CODE 636 W HCPCS: Performed by: INTERNAL MEDICINE

## 2018-08-01 PROCEDURE — 27000646 HC AEROBIKA DEVICE

## 2018-08-01 PROCEDURE — 99900035 HC TECH TIME PER 15 MIN (STAT)

## 2018-08-01 PROCEDURE — 84100 ASSAY OF PHOSPHORUS: CPT

## 2018-08-01 PROCEDURE — 25000003 PHARM REV CODE 250: Performed by: STUDENT IN AN ORGANIZED HEALTH CARE EDUCATION/TRAINING PROGRAM

## 2018-08-01 PROCEDURE — P9021 RED BLOOD CELLS UNIT: HCPCS

## 2018-08-01 PROCEDURE — 94664 DEMO&/EVAL PT USE INHALER: CPT

## 2018-08-01 PROCEDURE — 82330 ASSAY OF CALCIUM: CPT

## 2018-08-01 PROCEDURE — 86920 COMPATIBILITY TEST SPIN: CPT

## 2018-08-01 PROCEDURE — 99232 SBSQ HOSP IP/OBS MODERATE 35: CPT | Mod: GC,,, | Performed by: INTERNAL MEDICINE

## 2018-08-01 PROCEDURE — 63600175 PHARM REV CODE 636 W HCPCS: Performed by: STUDENT IN AN ORGANIZED HEALTH CARE EDUCATION/TRAINING PROGRAM

## 2018-08-01 PROCEDURE — 94761 N-INVAS EAR/PLS OXIMETRY MLT: CPT

## 2018-08-01 PROCEDURE — 25000003 PHARM REV CODE 250: Performed by: INTERNAL MEDICINE

## 2018-08-01 PROCEDURE — 86850 RBC ANTIBODY SCREEN: CPT

## 2018-08-01 PROCEDURE — 63600175 PHARM REV CODE 636 W HCPCS: Performed by: PEDIATRICS

## 2018-08-01 PROCEDURE — 90935 HEMODIALYSIS ONE EVALUATION: CPT

## 2018-08-01 PROCEDURE — 85014 HEMATOCRIT: CPT

## 2018-08-01 PROCEDURE — 63600175 PHARM REV CODE 636 W HCPCS: Performed by: SURGERY

## 2018-08-01 PROCEDURE — 25000003 PHARM REV CODE 250: Performed by: SURGERY

## 2018-08-01 PROCEDURE — 36430 TRANSFUSION BLD/BLD COMPNT: CPT

## 2018-08-01 PROCEDURE — 83735 ASSAY OF MAGNESIUM: CPT

## 2018-08-01 PROCEDURE — 63600175 PHARM REV CODE 636 W HCPCS: Performed by: NURSE PRACTITIONER

## 2018-08-01 PROCEDURE — 99233 SBSQ HOSP IP/OBS HIGH 50: CPT | Mod: 24,,, | Performed by: NURSE PRACTITIONER

## 2018-08-01 PROCEDURE — 85018 HEMOGLOBIN: CPT

## 2018-08-01 PROCEDURE — 97530 THERAPEUTIC ACTIVITIES: CPT

## 2018-08-01 PROCEDURE — 25000003 PHARM REV CODE 250: Performed by: PHYSICIAN ASSISTANT

## 2018-08-01 PROCEDURE — 80053 COMPREHEN METABOLIC PANEL: CPT

## 2018-08-01 PROCEDURE — 85025 COMPLETE CBC W/AUTO DIFF WBC: CPT

## 2018-08-01 PROCEDURE — 97112 NEUROMUSCULAR REEDUCATION: CPT

## 2018-08-01 PROCEDURE — 80197 ASSAY OF TACROLIMUS: CPT

## 2018-08-01 RX ORDER — SODIUM CHLORIDE 9 MG/ML
INJECTION, SOLUTION INTRAVENOUS
Status: DISCONTINUED | OUTPATIENT
Start: 2018-08-01 | End: 2018-08-03

## 2018-08-01 RX ORDER — SODIUM CHLORIDE 9 MG/ML
INJECTION, SOLUTION INTRAVENOUS ONCE
Status: COMPLETED | OUTPATIENT
Start: 2018-08-01 | End: 2018-08-01

## 2018-08-01 RX ORDER — HYDROCODONE BITARTRATE AND ACETAMINOPHEN 500; 5 MG/1; MG/1
TABLET ORAL
Status: DISCONTINUED | OUTPATIENT
Start: 2018-08-01 | End: 2018-08-03

## 2018-08-01 RX ADMIN — LEVETIRACETAM 500 MG: 500 TABLET ORAL at 08:08

## 2018-08-01 RX ADMIN — HEPARIN SODIUM 5000 UNITS: 5000 INJECTION, SOLUTION INTRAVENOUS; SUBCUTANEOUS at 08:08

## 2018-08-01 RX ADMIN — FUROSEMIDE 100 MG: 10 INJECTION, SOLUTION INTRAVENOUS at 12:08

## 2018-08-01 RX ADMIN — ACYCLOVIR 200 MG: 200 CAPSULE ORAL at 12:08

## 2018-08-01 RX ADMIN — HEPARIN SODIUM 5000 UNITS: 5000 INJECTION, SOLUTION INTRAVENOUS; SUBCUTANEOUS at 03:08

## 2018-08-01 RX ADMIN — TRIAMCINOLONE ACETONIDE: 1 CREAM TOPICAL at 08:08

## 2018-08-01 RX ADMIN — PREDNISONE 20 MG: 20 TABLET ORAL at 12:08

## 2018-08-01 RX ADMIN — ACYCLOVIR 200 MG: 200 CAPSULE ORAL at 08:08

## 2018-08-01 RX ADMIN — FLUCONAZOLE 200 MG: 200 TABLET ORAL at 12:08

## 2018-08-01 RX ADMIN — FUROSEMIDE 100 MG: 10 INJECTION, SOLUTION INTRAVENOUS at 05:08

## 2018-08-01 RX ADMIN — TACROLIMUS 5 MG: 5 CAPSULE ORAL at 05:08

## 2018-08-01 RX ADMIN — MYCOPHENOLATE MOFETIL 1000 MG: 250 CAPSULE ORAL at 08:08

## 2018-08-01 RX ADMIN — SODIUM CHLORIDE: 0.9 INJECTION, SOLUTION INTRAVENOUS at 11:08

## 2018-08-01 RX ADMIN — DOCUSATE SODIUM 100 MG: 100 CAPSULE, LIQUID FILLED ORAL at 12:08

## 2018-08-01 RX ADMIN — INSULIN ASPART 5 UNITS: 100 INJECTION, SOLUTION INTRAVENOUS; SUBCUTANEOUS at 03:08

## 2018-08-01 RX ADMIN — CEFTRIAXONE SODIUM 2 G: 2 INJECTION, POWDER, FOR SOLUTION INTRAMUSCULAR; INTRAVENOUS at 05:08

## 2018-08-01 RX ADMIN — INSULIN ASPART 5 UNITS: 100 INJECTION, SOLUTION INTRAVENOUS; SUBCUTANEOUS at 05:08

## 2018-08-01 RX ADMIN — MYCOPHENOLATE MOFETIL 1000 MG: 250 CAPSULE ORAL at 05:08

## 2018-08-01 RX ADMIN — PANTOPRAZOLE SODIUM 40 MG: 40 TABLET, DELAYED RELEASE ORAL at 08:08

## 2018-08-01 RX ADMIN — HEPARIN SODIUM 5000 UNITS: 5000 INJECTION, SOLUTION INTRAVENOUS; SUBCUTANEOUS at 05:08

## 2018-08-01 RX ADMIN — TRIAMCINOLONE ACETONIDE: 1 CREAM TOPICAL at 12:08

## 2018-08-01 RX ADMIN — ATORVASTATIN CALCIUM 40 MG: 20 TABLET, FILM COATED ORAL at 12:08

## 2018-08-01 RX ADMIN — INSULIN DETEMIR 9 UNITS: 100 INJECTION, SOLUTION SUBCUTANEOUS at 12:08

## 2018-08-01 RX ADMIN — LEVETIRACETAM 500 MG: 500 TABLET ORAL at 12:08

## 2018-08-01 RX ADMIN — PANTOPRAZOLE SODIUM 40 MG: 40 TABLET, DELAYED RELEASE ORAL at 12:08

## 2018-08-01 RX ADMIN — SULFAMETHOXAZOLE AND TRIMETHOPRIM 1 TABLET: 400; 80 TABLET ORAL at 06:08

## 2018-08-01 NOTE — PROGRESS NOTES
HD completed blood returned vitals remained stable however pt remains bradycardiac. Md aware. I unit of pRBC transfused while on HD. Report called to nurse

## 2018-08-01 NOTE — PROGRESS NOTES
Received pt to unit via hospital bed. Pt AAOX4, denies pain or discomfort. Noted to be Sinus Daryl 48-46, Md notified by CN, was informed that this was pt normal, and that there is a plan to transfuse blood while on HD. Right IJ noted dressing dry and intact arterial lumen flushes well with good blood return, venous port flushes well but sluggish blood return. Acute HD started w/o difficulty, lines secured with tape and blue clamp, Goal to remove 2L over 3hr tx.

## 2018-08-01 NOTE — SUBJECTIVE & OBJECTIVE
Scheduled Meds:   sodium chloride 0.9%   Intravenous Once    sodium chloride 0.9%   Intravenous Once    acyclovir  200 mg Oral BID    atorvastatin  40 mg Oral Daily    cefTRIAXone (ROCEPHIN) IVPB  2 g Intravenous Q24H    docusate sodium  100 mg Oral Daily    ergocalciferol  50,000 Units Oral Q7 Days    fluconazole  200 mg Oral Daily    furosemide  100 mg Intravenous BID    heparin (porcine)  5,000 Units Subcutaneous Q8H    insulin aspart U-100  5 Units Subcutaneous TIDWM    insulin detemir U-100  9 Units Subcutaneous Daily    levETIRAcetam  500 mg Oral BID    mycophenolate  1,000 mg Oral BID    pantoprazole  40 mg Oral BID    predniSONE  20 mg Oral Daily    sulfamethoxazole-trimethoprim 400-80mg  1 tablet Oral Every Mon, Wed, Fri    tacrolimus  5 mg Oral BID    triamcinolone acetonide 0.1%   Topical (Top) BID     Continuous Infusions:  PRN Meds:sodium chloride, sodium chloride 0.9%, sodium chloride 0.9%, sodium chloride 0.9%, dextrose 50%, dextrose 50%, gelatin adsorbable 100cm top sponge, glucagon (human recombinant), glucose, glucose, insulin aspart U-100, ondansetron, oxyCODONE, oxyCODONE, sodium chloride 0.9%    Review of Systems   Constitutional: Positive for activity change, appetite change and fatigue. Negative for chills, diaphoresis and fever.   HENT: Negative for congestion and facial swelling.    Eyes: Negative for pain, discharge and visual disturbance.   Respiratory: Positive for cough. Negative for chest tightness, shortness of breath and wheezing.    Cardiovascular: Positive for leg swelling. Negative for chest pain and palpitations.   Gastrointestinal: Positive for abdominal distention. Negative for abdominal pain, constipation, diarrhea, nausea and vomiting.   Endocrine: Negative.    Genitourinary: Positive for decreased urine volume.   Musculoskeletal: Negative for back pain.   Skin: Positive for wound.   Allergic/Immunologic: Positive for immunocompromised state.   Neurological:  Positive for weakness. Negative for dizziness, tremors and headaches.   Hematological: Negative.    Psychiatric/Behavioral: Negative for agitation, dysphoric mood and sleep disturbance. The patient is not nervous/anxious.      Objective:     Vital Signs (Most Recent):  Temp: 98.5 °F (36.9 °C) (08/01/18 1200)  Pulse: (!) 58 (08/01/18 1200)  Resp: 18 (08/01/18 1200)  BP: (!) 103/49 (08/01/18 1200)  SpO2: 98 % (08/01/18 0757) Vital Signs (24h Range):  Temp:  [97.6 °F (36.4 °C)-98.5 °F (36.9 °C)] 98.5 °F (36.9 °C)  Pulse:  [47-80] 58  Resp:  [16-20] 18  SpO2:  [94 %-98 %] 98 %  BP: ()/(45-60) 103/49     Weight: 129.8 kg (286 lb 2.5 oz)  Body mass index is 36.74 kg/m².    Intake/Output - Last 3 Shifts       07/30 0700 - 07/31 0659 07/31 0700 - 08/01 0659 08/01 0700 - 08/02 0659    P.O. 1260 720     I.V. (mL/kg) 254 (2) 100 (0.8)     Blood   300    Other 200      IV Piggyback  50     Total Intake(mL/kg) 1714 (13.3) 870 (6.7) 300 (2.3)    Urine (mL/kg/hr) 0 (0)      Other 3800 (1.2)  1600 (1.5)    Total Output 3800   1600    Net -2086 +870 -1300           Urine Occurrence 1 x 8 x     Stool Occurrence 1 x 3 x           Physical Exam   Constitutional: He is oriented to person, place, and time. He appears well-developed. No distress.   Temporal and distal extremity muscle wasting   HENT:   Head: Normocephalic and atraumatic.   Eyes: EOM are normal. Pupils are equal, round, and reactive to light. No scleral icterus.   Cardiovascular: Normal rate, regular rhythm and normal heart sounds.    Pulmonary/Chest: Effort normal and breath sounds normal.   Abdominal: Soft. Bowel sounds are normal. He exhibits distension (ascites). There is no tenderness.   - [employer] cdi no ssi   Musculoskeletal: He exhibits edema (3+ BLE).   Neurological: He is alert and oriented to person, place, and time.   Skin: Skin is warm and dry. He is not diaphoretic.   Psychiatric: He has a normal mood and affect. His behavior is normal. Judgment and  thought content normal.   Nursing note and vitals reviewed.      Laboratory:  Immunosuppressants         Stop Route Frequency     tacrolimus capsule 5 mg      -- Oral 2 times daily     mycophenolate capsule 1,000 mg      -- Oral 2 times daily        CBC:   Recent Labs  Lab 08/01/18  0500 08/01/18  0723   WBC 3.75*  --    RBC 2.08*  --    HGB 6.6* 6.6*   HCT 20.3* 20.8*   PLT 54*  --    MCV 98  --    MCH 31.7*  --    MCHC 32.5  --      CMP:   Recent Labs  Lab 08/01/18  0500   *   CALCIUM 7.2*   ALBUMIN 2.5*   PROT 4.0*   *   K 4.4   CO2 19*      BUN 42*   CREATININE 3.0*   ALKPHOS 77   ALT 39   AST 14   BILITOT 0.5     Labs within the past 24 hours have been reviewed.    Diagnostic Results:  I have personally reviewed all pertinent imaging studies.  None

## 2018-08-01 NOTE — PROGRESS NOTES
1045 pt clotted off able to return arterial but not venous. Pt restarted and 1st unit of 1unit of pRBC infusing started @1000am, UF goal increased to 1900

## 2018-08-01 NOTE — PT/OT/SLP PROGRESS
"Physical Therapy Treatment    Patient Name:  Alon Adamson   MRN:  [id number]    Recommendations:     Discharge Recommendations:  nursing facility, skilled   Discharge Equipment Recommendations:  (TBD)   Barriers to discharge: Decreased caregiver support    Assessment:     Alon Adamson is a 63 y.o. male admitted with a medical diagnosis of Liver transplanted.  He presents with the following impairments/functional limitations:  weakness, gait instability, impaired endurance, impaired balance, impaired self care skills, impaired functional mobilty, decreased coordination, decreased upper extremity function, decreased lower extremity function, decreased safety awareness, pain, impaired skin, edema. Pt progressing functional mobility, as he was able to complete sit<>stand transfers x3 trials this date. Attempted lateral steps, but pt unable to complete 2* LE weakness and impaired balance. Pt would continue to benefit from skilled acute PT in order to address current deficits and progress functional mobility.     Rehab Prognosis:  good; patient would benefit from acute skilled PT services to address these deficits and reach maximum level of function.      Recent Surgery: Procedure(s) (LRB):  TRANSPLANT, LIVER (N/A) 9 Days Post-Op    Plan:     During this hospitalization, patient to be seen 4 x/week to address the above listed problems via gait training, therapeutic activities, therapeutic exercises, neuromuscular re-education  · Plan of Care Expires:  08/26/18   Plan of Care Reviewed with: patient, mother    Subjective     Communicated with RN prior to session.  Patient found supine upon PT entry to room, agreeable to treatment.      Chief Complaint: scrotal pain and edema   Patient comments/goals: "I just need to get some of the fluid off."   Pain/Comfort:  · Pain Rating 1:  (did not rate)  · Location - Side 1: Bilateral  · Location 1: scrotum (also reporting occasional incisional pain)  · Pain Addressed 1: " Reposition, Distraction    Patients cultural, spiritual, Taoist conflicts given the current situation: none noted     Objective:     Patient found with: telemetry, central line     General Precautions: Standard, fall   Orthopedic Precautions:N/A   Braces: N/A     Functional Mobility:  · Bed Mobility:     · Scooting: seated scooting along EOB with maximal assistance, of 2 persons and x3 reps  · Cues throughout for hand placement and technique   · Supine to Sit: maximal assistance with side rail and HOB elevated   · Sit to Supine: maximal assistance  · Transfers:     · Sit to Stand:  moderate assistance and of 2 persons with no AD and x3 reps  · Cues for hand placement, use of forward momentum, and anterior weight-shift  · Performed static standing x 3 trials, x~30-45 seconds each trial with minAx2 and HHAx2  · Gait: attempted lateral steps along EOB but pt unable to weight-shift appropriately in order to advance LE; returned to sit EOB      AM-PAC 6 CLICK MOBILITY  Turning over in bed (including adjusting bedclothes, sheets and blankets)?: 2  Sitting down on and standing up from a chair with arms (e.g., wheelchair, bedside commode, etc.): 2  Moving from lying on back to sitting on the side of the bed?: 2  Moving to and from a bed to a chair (including a wheelchair)?: 1  Need to walk in hospital room?: 1  Climbing 3-5 steps with a railing?: 1  Basic Mobility Total Score: 9       Therapeutic Activities and Exercises:   Pt educated on PT POC, technique with mobility, progression of activity.   Extended intermittent seated rest breaks required.     Patient left supine with all lines intact, call button in reach, RN notified and pt's mother present ..    GOALS:    Physical Therapy Goals        Problem: Physical Therapy Goal    Goal Priority Disciplines Outcome Goal Variances Interventions   Physical Therapy Goal     PT/OT, PT Ongoing (interventions implemented as appropriate)     Description:  Goals to be met by:  2018    Patient will increase functional independence with mobility by performin. Supine to sit with moderate assistance - not met  2. Sit to stand with moderate assistance - not met  3. Gait x 50ft with moderate assistance with RW. - not met  4. Ascend/descend 5 stair with right Handrails with moderate assistance. - not met  5. Lower extremity exercise program x 20 reps, with supervision, in order to increase LE strength and (I) with functional mobility.                                Time Tracking:     PT Received On: 18  PT Start Time: 1415     PT Stop Time: 1500  PT Total Time (min): 45 min     Billable Minutes: Neuromuscular Re-education 45   (co-tx with OT)    Treatment Type: Treatment  PT/PTA: PT     PTA Visit Number: 0     Lucille Craig PT, DPT   2018  [phone]

## 2018-08-01 NOTE — PT/OT/SLP PROGRESS
"Occupational Therapy   Treatment    Name: Alon Adamson  MRN: [id number]  Admitting Diagnosis:  Liver transplanted  9 Days Post-Op    Recommendations:     Discharge Recommendations: nursing facility, skilled  Discharge Equipment Recommendations:  walker, rolling  Barriers to discharge:  Decreased caregiver support, Inaccessible home environment    Subjective   "i used to roll around in my wheelchair and walk out there"  Communicated with: darell prior to session. Cc with PT  Pain/Comfort:  · Pain Rating 1:  (no number given, just reports some burning pain at sx site at times)  · Pain Addressed 1: Distraction    Patients cultural, spiritual, Cheondoism conflicts given the current situation: none    Objective:     Patient found with: telemetry    General Precautions: Standard, fall   Orthopedic Precautions:N/A   Braces:       Occupational Performance:    Bed Mobility:    · Sup to sit with max assist with HOB up  · Sit to sup with max assist     Functional Mobility/Transfers:  · Sit to stand x 3 reps with max assist x 2  · Functional Mobility: attempted to take lateral steps but u/a to shift weight adequately and returned to sitting  · Scooting EOB with max assist x 2    Activities of Daily Living:  · Socks with total assist  · Feeding with setup    Patient left HOB elevated with all lines intact and call button in reach    [facility] 6 Click:  [facility] Total Score: 10    Treatment & Education:  Performed above activity, educated on POC, progress being made, general recovery process and progression of activity, techniques for sit to stand and wt. Shifts sitting and standing.  Education:    Assessment:     Alon Adamson is a 63 y.o. male with a medical diagnosis of Liver transplanted.  He presents with improving mobility, good effort with activity.  Performance deficits affecting function are weakness, impaired endurance, impaired self care skills, impaired functional mobilty, gait instability, impaired balance, " decreased lower extremity function, decreased upper extremity function, impaired cardiopulmonary response to activity, edema.      Rehab Prognosis:  good; patient would benefit from acute skilled OT services to address these deficits and reach maximum level of function.       Plan:     Patient to be seen 4 x/week to address the above listed problems via self-care/home management, therapeutic activities, therapeutic exercises, neuromuscular re-education  · Plan of Care Expires: 08/26/18  · Plan of Care Reviewed with: patient    This Plan of care has been discussed with the patient who was involved in its development and understands and is in agreement with the identified goals and treatment plan    GOALS:    Occupational Therapy Goals        Problem: Occupational Therapy Goal    Goal Priority Disciplines Outcome Interventions   Occupational Therapy Goal     OT, PT/OT Ongoing (interventions implemented as appropriate)    Description:  Goals to be met by: 8/10/18    Patient will increase functional independence with ADLs by performing:     Upper body dressing while seated EOB with SBA  Grooming while standing at sink with Stand-by Assistance  Toileting from bedside commode with Minimal Assistance for hygiene and clothing management.  Toilet transfer to bedside commode with Contact Guard Assistance.  Pt will complete a functional static standing activity x ~4 minutes with CGA.   LB Dressing with Moderate Assistance                            Time Tracking:     OT Date of Treatment: 08/01/18  OT Start Time: 1415  OT Stop Time: 1500  OT Total Time (min): 45 min    Billable Minutes:Therapeutic Activity 45 min    Anni Roman OT  8/1/2018

## 2018-08-01 NOTE — PLAN OF CARE
Problem: Patient Care Overview  Goal: Plan of Care Review  Outcome: Ongoing (interventions implemented as appropriate)  Explained to pt duration of HD amt of fluid to be removed and the transfusion of blood, verbalized undertanding

## 2018-08-01 NOTE — PLAN OF CARE
Problem: Patient Care Overview  Goal: Plan of Care Review  Outcome: Ongoing (interventions implemented as appropriate)  Pt AAO x4 throughout shift. HD completed this AM with 1L removed. 1u PRBC transfused during HD. Pt's mother pulling self meds 100%. Pt free from falls and injury throughout shift. BG monitored as ordered with meal time insulin given per order. Rolling walker ordered to help patient feel more steady with walking in room and to encourage patient to get out of bed.

## 2018-08-01 NOTE — PROGRESS NOTES
Wound care follow up for compression wraps to the legs. Patient with +4 pitting edema to the BLEs. Patient RUTH are documented in EPIC and within range for compression.   Coflex Kit (2 layer with calamine) was applied to BLEs. Patient tolerated well and stated they were comfortable.   Will change compression wraps Friday.      08/01/18 1530       Wound 06/09/18 0100 Other (comment) lower Leg   Date First Assessed/Time First Assessed: 06/09/18 0100   Pre-existing: Yes  Primary Wound Type: (c) Other (comment)  Side: (c)   Orientation: lower  Location: Leg   Wound WDL ex   Dressing Appearance Dry;Intact   Drainage Amount Scant   Drainage Characteristics/Odor Serosanguineous   Appearance [place]   Periwound Area Other (see comments)  (HEMOSIDERIN)   Care Cleansed with:;Sterile normal saline   Dressing Foam;Compression wrap  (coflex kit)   Dressing Change Due 08/03/18

## 2018-08-01 NOTE — CARE UPDATE
In dialysis this AM. Discussed plan of care with family and RN at bedside. Okay to give Levemir 9 units this afternoon when returning from dialysis. Will resume Levemir 9 units daily in AM pending FBG. Continue novolog 5 units with meals. bg monitoring ac/hs and low dose correction scale. ** Please call Endocrine for any BG related issues **

## 2018-08-01 NOTE — PLAN OF CARE
Problem: Physical Therapy Goal  Goal: Physical Therapy Goal  Goals to be met by: 2018    Patient will increase functional independence with mobility by performin. Supine to sit with moderate assistance - not met  2. Sit to stand with moderate assistance - not met  3. Gait x 50ft with moderate assistance with RW. - not met  4. Ascend/descend 5 stair with right Handrails with moderate assistance. - not met  5. Lower extremity exercise program x 20 reps, with supervision, in order to increase LE strength and (I) with functional mobility.               Outcome: Ongoing (interventions implemented as appropriate)  Goals reviewed and remain appropriate. Pt progressing towards goals.    Lucille Craig, PT, DPT   2018  [phone]

## 2018-08-01 NOTE — PROGRESS NOTES
[facility]  Liver Transplant  Progress Note    Patient Name: Alon Adamson  MRN: [id number]  Admission Date: 2018  Hospital Length of Stay: 40 days  Code Status: Full Code  Primary Care Provider: Mckinley Vides MD  Post-Operative Day: 9    ORGAN:   LIVER  Disease Etiology: Alcoholic Cirrhosis  Donor Type:    - Brain Death  CDC High Risk:   No  Donor CMV Status:   Donor CMV Status: Negative  Donor HBcAB:   Negative  Donor HCV Status:   Negative  Donor HBV TAMAR: Negative  Donor HCV TAMAR: Negative  Whole or Partial: Whole Liver  Biliary Anastomosis: End to End  Arterial Anatomy: Standard  Subjective:     History of Present Illness:  Mr. 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  Complications of liver disease include hyperbilirubinemia, hypoalbuminemia, severe malnutrition,   hepatic encephalopathy, thrombocytopenia, splenomegaly, ascites, hypervolemia, coagulopathy, portal HTN.  Pt admitted  for acutely worsening hepatic encephalopathy and concern for BLE cellulitis as well as severe volume overload.  Complications of ELSD managed while inpt and he received liver offer on 18.  Pt underwent liver transplant without complication: steroid induction, DBD, CMV -/-.  Donor with E coli bacteremia resistant to cipro placed on Rocephin x 2 weeks per ID recs.  Also on fluconazole x 30 days as pt very ill prior to transplant. Post transplant, with nice trend down of AST/ALT and LFTs.  Liver U/S with elevated RIs and decreased HAV.  During initial post operative period, pt required pressor support.  Pt with expected post operative VICKY as with VICKY prior to surgery.  Nephrology consulted and pt placed on intermittent CRRT.  Pressor requirement decreased and pt tolerated CRRT without pressors.  Pt with asymptomatic bradycardia while in ICU prompting cardiology consult. Pt without need for acute intervention but atropine placed at bedside.  HR has improved POD  7 and 8.  He was transferred to the TSU 7/30 PM.          Hospital Course:  Interval history: pt feeling ok today. Seen in HD and tolerating well. Pt HR decreased to 40-50s while sleeping in HD. Pt asymptomatic. HR improved to 60s once awake. Liver US yesterday showed small fluid collection likely an adrenal hemorrhage vs hematoma. H&H decreased to 6.6/20.3. Transfuse 1 unit PRBCs. Pt continues to have difficulty with urinating due to scrotal edema. 8 unmeasured uop recorded. Pt states he feels like he is urinating more. Continue lasix 100 mg bid. Continue PT/OT and encourage supplements for malnutrition. Monitor.     Scheduled Meds:   sodium chloride 0.9%   Intravenous Once    sodium chloride 0.9%   Intravenous Once    acyclovir  200 mg Oral BID    atorvastatin  40 mg Oral Daily    cefTRIAXone (ROCEPHIN) IVPB  2 g Intravenous Q24H    docusate sodium  100 mg Oral Daily    ergocalciferol  50,000 Units Oral Q7 Days    fluconazole  200 mg Oral Daily    furosemide  100 mg Intravenous BID    heparin (porcine)  5,000 Units Subcutaneous Q8H    insulin aspart U-100  5 Units Subcutaneous TIDWM    insulin detemir U-100  9 Units Subcutaneous Daily    levETIRAcetam  500 mg Oral BID    mycophenolate  1,000 mg Oral BID    pantoprazole  40 mg Oral BID    predniSONE  20 mg Oral Daily    sulfamethoxazole-trimethoprim 400-80mg  1 tablet Oral Every Mon, Wed, Fri    tacrolimus  5 mg Oral BID    triamcinolone acetonide 0.1%   Topical (Top) BID     Continuous Infusions:  PRN Meds:sodium chloride, sodium chloride 0.9%, sodium chloride 0.9%, sodium chloride 0.9%, dextrose 50%, dextrose 50%, gelatin adsorbable 100cm top sponge, glucagon (human recombinant), glucose, glucose, insulin aspart U-100, ondansetron, oxyCODONE, oxyCODONE, sodium chloride 0.9%    Review of Systems   Constitutional: Positive for activity change, appetite change and fatigue. Negative for chills, diaphoresis and fever.   HENT: Negative for congestion  and facial swelling.    Eyes: Negative for pain, discharge and visual disturbance.   Respiratory: Positive for cough. Negative for chest tightness, shortness of breath and wheezing.    Cardiovascular: Positive for leg swelling. Negative for chest pain and palpitations.   Gastrointestinal: Positive for abdominal distention. Negative for abdominal pain, constipation, diarrhea, nausea and vomiting.   Endocrine: Negative.    Genitourinary: Positive for decreased urine volume.   Musculoskeletal: Negative for back pain.   Skin: Positive for wound.   Allergic/Immunologic: Positive for immunocompromised state.   Neurological: Positive for weakness. Negative for dizziness, tremors and headaches.   Hematological: Negative.    Psychiatric/Behavioral: Negative for agitation, dysphoric mood and sleep disturbance. The patient is not nervous/anxious.      Objective:     Vital Signs (Most Recent):  Temp: 98.5 °F (36.9 °C) (08/01/18 1200)  Pulse: (!) 58 (08/01/18 1200)  Resp: 18 (08/01/18 1200)  BP: (!) 103/49 (08/01/18 1200)  SpO2: 98 % (08/01/18 0757) Vital Signs (24h Range):  Temp:  [97.6 °F (36.4 °C)-98.5 °F (36.9 °C)] 98.5 °F (36.9 °C)  Pulse:  [47-80] 58  Resp:  [16-20] 18  SpO2:  [94 %-98 %] 98 %  BP: ()/(45-60) 103/49     Weight: 129.8 kg (286 lb 2.5 oz)  Body mass index is 36.74 kg/m².    Intake/Output - Last 3 Shifts       07/30 0700 - 07/31 0659 07/31 0700 - 08/01 0659 08/01 0700 - 08/02 0659    P.O. 1260 720     I.V. (mL/kg) 254 (2) 100 (0.8)     Blood   300    Other 200      IV Piggyback  50     Total Intake(mL/kg) 1714 (13.3) 870 (6.7) 300 (2.3)    Urine (mL/kg/hr) 0 (0)      Other 3800 (1.2)  1600 (1.5)    Total Output 3800   1600    Net -2086 +870 -1300           Urine Occurrence 1 x 8 x     Stool Occurrence 1 x 3 x           Physical Exam   Constitutional: He is oriented to person, place, and time. He appears well-developed. No distress.   Temporal and distal extremity muscle wasting   HENT:   Head:  Normocephalic and atraumatic.   Eyes: EOM are normal. Pupils are equal, round, and reactive to light. No scleral icterus.   Cardiovascular: Normal rate, regular rhythm and normal heart sounds.    Pulmonary/Chest: Effort normal and breath sounds normal.   Abdominal: Soft. Bowel sounds are normal. He exhibits distension (ascites). There is no tenderness.   - [employer] cdi no ssi   Musculoskeletal: He exhibits edema (3+ BLE).   Neurological: He is alert and oriented to person, place, and time.   Skin: Skin is warm and dry. He is not diaphoretic.   Psychiatric: He has a normal mood and affect. His behavior is normal. Judgment and thought content normal.   Nursing note and vitals reviewed.      Laboratory:  Immunosuppressants         Stop Route Frequency     tacrolimus capsule 5 mg      -- Oral 2 times daily     mycophenolate capsule 1,000 mg      -- Oral 2 times daily        CBC:   Recent Labs  Lab 08/01/18  0500 08/01/18  0723   WBC 3.75*  --    RBC 2.08*  --    HGB 6.6* 6.6*   HCT 20.3* 20.8*   PLT 54*  --    MCV 98  --    MCH 31.7*  --    MCHC 32.5  --      CMP:   Recent Labs  Lab 08/01/18  0500   *   CALCIUM 7.2*   ALBUMIN 2.5*   PROT 4.0*   *   K 4.4   CO2 19*      BUN 42*   CREATININE 3.0*   ALKPHOS 77   ALT 39   AST 14   BILITOT 0.5     Labs within the past 24 hours have been reviewed.    Diagnostic Results:  I have personally reviewed all pertinent imaging studies.  None    Assessment/Plan:     * Liver transplanted    - LFTs/enzymes improved  - POD 1 U/s with elevated RIs  - Repeat liver U/S pending          Long-term use of immunosuppressant medication    - see prophylactic immuno.           Prophylactic immunotherapy    - continue prograf, cellcept and prednisone. Monitor labs daily and adjust dose accordingly for a therapeutic level.           At risk for opportunistic infections    - Continue Bactrim, acyclovir, nystatin          Sinus bradycardia    - Improved  - Atropine at bedside if  HR decreases again          Positive blood culture in cadaveric donor    - E coli bacteremia in donor resistant to Cipro  - Rocephin x 14 days --> start date 7/23        Steroid-induced hyperglycemia    - Endocrine consulted          At risk for infection transmitted from donor              CKD (chronic kidney disease), stage III    - See VICKY.          Severe protein-calorie malnutrition    - Dietary consulted.  - Continue supplements.  - Pt eating well.        Physical deconditioning    - PT/OT daily.         Ascites due to alcoholic cirrhosis    - Para 6/25 negative for infection   - Para 7/10 negative for infection. Monitor.           Hypoalbuminemia due to protein-calorie malnutrition    - see severe protein-calorie malnutrition.        Pancytopenia    - Related to decompensated liver disease --> should improve now that pt transplanted.        Acute kidney injury superimposed on chronic kidney disease    - With CKD3.  - Expected VICKY post txp.  - UOP unclear as pt unable to measure urine (severe scrotal edema).  - Tolerated HD 7/30 PM  - Nephrology on board: placed pt on lasix 100 mg IV BID.  - Tolerated HD today, 8/1.        Coronary artery disease involving native coronary artery of native heart without angina pectoris    - Continue home ASA.   - 2D Echo 7/9 normal EF (55-60%), trivial tricuspid/mitral regurg.        Anemia of chronic disease    - H&H 6.6/20.3. Transfuse 1 unit PRBCs.        Thrombocytopenia    - Secondary to splenomegaly from portal HTN- should improve with txp.  - No s/s overt bleed.        Seizures    - Continue oral Keppra.  - Keep Mg > 2.        Bilateral edema of lower extremity    - continue lasix 100 mg IV BID.  - Continue intermittent dialysis.   - Tolerated HD well 8/1.             VTE Risk Mitigation         Ordered     heparin (porcine) injection 5,000 Units  Every 8 hours      07/26/18 1330     Place sequential compression device  Until discontinued      07/23/18 1326     IP VTE  HIGH RISK PATIENT  Once      07/23/18 1242          The patients clinical status was discussed at multidisplinary rounds, involving transplant surgery, transplant medicine, pharmacy, nursing, nutrition, and social work    Discharge Planning: not a candidate for dc at this time.       Daphne Day NP  Liver Transplant  [facility]

## 2018-08-01 NOTE — PROGRESS NOTES
[facility]  Nephrology  Progress Note    Patient Name: Alon Adamson  MRN: [id number]  Admission Date: 6/22/2018  Hospital Length of Stay: 40 days  Attending Provider: Jair Sheridan MD   Primary Care Physician: Mckinley Vides MD  Principal Problem:Liver transplanted    Subjective:     HPI: Mr. Adamson is 63 yr old male with decompensated alcoholic cirrhosis, CKD III and CAD admitted here on 06/22/18 admitted for bilateral LE hypervolemia and VICKY on CKD III with cr of 2.3 baseline around 1.5. Patient has multiple hospitalization in the past secondary to decompensated liver failure. Patient is currently on transplant team. During current admission patient was getting lasix and albumin intermittently for VICKY however renal function was not getting better. Patient hospital course complicated by hepatic encephalopathy with some improvement of mentation with lactulose. Also treated for cellulitis with 7 days of vancomycin.Nephrology is consulted for worsening/not improving VICKY despite lasix/albumin.     Per chart review patient has no hypotensive episodes, BP mostly above 100's. Has not received nephrotoxins such as NSAIDs/contrast media. No sever sepsis/septic shock or acute blood loss during current admit. UA with 2+ leukocytes and Hyaline casts, no wbc/rbc cast or proteinuria. Urine Na+ < 20.    Patient was transferred to ICU on 7/13/2018 after being more lethargic and hypoactive.  After two days was stepped down back to [facility].       Interval History: seen in [facility] on dialysis, tolerating well, no complaints or issues overnigh    Review of patient's allergies indicates:   Allergen Reactions    Nsaids (non-steroidal anti-inflammatory drug)      D/t to liver disease.    Tylenol [acetaminophen]      D/t liver disease.    Penicillins Other (See Comments)     Tolerated pip-tazo in 8/2016 without issue  Tolerated cefepime 7/2018 without issue  Since childhood was told not to take it      Current Facility-Administered Medications   Medication Frequency    0.9%  NaCl infusion (for blood administration) Q24H PRN    0.9%  NaCl infusion PRN    0.9%  NaCl infusion Once    0.9%  NaCl infusion PRN    0.9%  NaCl infusion Once    0.9%  NaCl infusion PRN    0.9%  NaCl infusion Once    acyclovir capsule 200 mg BID    atorvastatin tablet 40 mg Daily    cefTRIAXone (ROCEPHIN) 2 g in dextrose 5 % 50 mL IVPB Q24H    dextrose 50% injection 12.5 g PRN    dextrose 50% injection 25 g PRN    docusate sodium capsule 100 mg Daily    ergocalciferol capsule 50,000 Units Q7 Days    fluconazole tablet 200 mg Daily    furosemide injection 100 mg BID    gelatin adsorbable 100cm top sponge 100 sponge 1 applicator PRN    glucagon (human recombinant) injection 1 mg PRN    glucose chewable tablet 16 g PRN    glucose chewable tablet 24 g PRN    heparin (porcine) injection 5,000 Units Q8H    insulin aspart U-100 pen 0-5 Units QID (AC + HS) PRN    insulin aspart U-100 pen 5 Units TIDWM    insulin detemir U-100 pen 9 Units Daily    levETIRAcetam tablet 500 mg BID    mycophenolate capsule 1,000 mg BID    ondansetron injection 4 mg Q8H PRN    oxyCODONE immediate release tablet 10 mg Q6H PRN    oxyCODONE immediate release tablet 5 mg Q6H PRN    pantoprazole EC tablet 40 mg BID    predniSONE tablet 20 mg Daily    sodium chloride 0.9% flush 3 mL PRN    sulfamethoxazole-trimethoprim 400-80mg per tablet 1 tablet Every Mon, Wed, Fri    tacrolimus capsule 5 mg BID    triamcinolone acetonide 0.1% cream BID       Objective:     Vital Signs (Most Recent):  Temp: 98.5 °F (36.9 °C) (08/01/18 1200)  Pulse: (!) 58 (08/01/18 1200)  Resp: 18 (08/01/18 1200)  BP: (!) 103/49 (08/01/18 1200)  SpO2: 98 % (08/01/18 0757)  O2 Device (Oxygen Therapy): room air (08/01/18 1200) Vital Signs (24h Range):  Temp:  [97.6 °F (36.4 °C)-98.5 °F (36.9 °C)] 98.5 °F (36.9 °C)  Pulse:  [47-80] 58  Resp:  [16-20] 18  SpO2:  [94 %-98 %]  98 %  BP: ()/(45-60) 103/49     Weight: 129.8 kg (286 lb 2.5 oz) (08/01/18 0500)  Body mass index is 36.74 kg/m².  Body surface area is 2.6 meters squared.    I/O last 3 completed shifts:  In: 1650 [P.O.:1200; I.V.:200; Other:200; IV Piggyback:50]  Out: 3800 [Other:3800]    Physical Exam   HENT:   Head: Atraumatic.   Neck: No JVD present.   Cardiovascular: Normal rate and regular rhythm.  Exam reveals no friction rub.    Pulmonary/Chest: Effort normal. He has rales (at bases mostly).   Abdominal: Soft. He exhibits no distension.   Musculoskeletal: He exhibits edema.   Neurological: He is alert.   Skin: Skin is warm.         Assessment/Plan:     CKD (chronic kidney disease), stage III    No with acute component and requiring dialysis (see VICKY section)        Acute kidney injury superimposed on chronic kidney disease    Started on RRT, today with increasing sCr, although acid/base and lytes are fine, he is still significantly volume overloaded    - beginning to make some urine, although not adequately recorded (goes on the pad in the bed), however not providing much clearance and likely not making enough from a volume standpoint    - HD in the [facility] today and tentatively again for Friday  - strict Is & Os and serial RFPs  - Avoid nephrotoxic medications  - Hb > 7gm/dL            Thank you for your consult. I will follow-up with patient. Please contact us if you have any additional questions.    Elan Magana MD  Nephrology  [facility]      ATTENDING PHYSICIAN ATTESTATION  I have personally interviewed and examined the patient. I thoroughly reviewed the demographic, clinical, laboratorial and imaging information available in medical records. I agree with the assessment and recommendations provided by the subspecialty resident. Dr. Magana was under my supervision.

## 2018-08-01 NOTE — NURSING
Alma NP notified of patient's lunch BG. Per Alma okay to give levemir from this AM. Pt instructed to call when lunch tray comes so that lunch insulin can be given. Pt and mother verbalized understanding. Will continue to monitor.

## 2018-08-01 NOTE — PLAN OF CARE
Problem: Occupational Therapy Goal  Goal: Occupational Therapy Goal  Goals to be met by: 8/10/18    Patient will increase functional independence with ADLs by performing:     Upper body dressing while seated EOB with SBA  Grooming while standing at sink with Stand-by Assistance  Toileting from bedside commode with Minimal Assistance for hygiene and clothing management.  Toilet transfer to bedside commode with Contact Guard Assistance.  Pt will complete a functional static standing activity x ~4 minutes with CGA.   LB Dressing with Moderate Assistance           Outcome: Ongoing (interventions implemented as appropriate)  con't with plan  Anni Roman, OTR

## 2018-08-01 NOTE — SUBJECTIVE & OBJECTIVE
Interval History: seen in [facility] on dialysis, tolerating well, no complaints or issues overnigh    Review of patient's allergies indicates:   Allergen Reactions    Nsaids (non-steroidal anti-inflammatory drug)      D/t to liver disease.    Tylenol [acetaminophen]      D/t liver disease.    Penicillins Other (See Comments)     Tolerated pip-tazo in 8/2016 without issue  Tolerated cefepime 7/2018 without issue  Since childhood was told not to take it     Current Facility-Administered Medications   Medication Frequency    0.9%  NaCl infusion (for blood administration) Q24H PRN    0.9%  NaCl infusion PRN    0.9%  NaCl infusion Once    0.9%  NaCl infusion PRN    0.9%  NaCl infusion Once    0.9%  NaCl infusion PRN    0.9%  NaCl infusion Once    acyclovir capsule 200 mg BID    atorvastatin tablet 40 mg Daily    cefTRIAXone (ROCEPHIN) 2 g in dextrose 5 % 50 mL IVPB Q24H    dextrose 50% injection 12.5 g PRN    dextrose 50% injection 25 g PRN    docusate sodium capsule 100 mg Daily    ergocalciferol capsule 50,000 Units Q7 Days    fluconazole tablet 200 mg Daily    furosemide injection 100 mg BID    gelatin adsorbable 100cm top sponge 100 sponge 1 applicator PRN    glucagon (human recombinant) injection 1 mg PRN    glucose chewable tablet 16 g PRN    glucose chewable tablet 24 g PRN    heparin (porcine) injection 5,000 Units Q8H    insulin aspart U-100 pen 0-5 Units QID (AC + HS) PRN    insulin aspart U-100 pen 5 Units TIDWM    insulin detemir U-100 pen 9 Units Daily    levETIRAcetam tablet 500 mg BID    mycophenolate capsule 1,000 mg BID    ondansetron injection 4 mg Q8H PRN    oxyCODONE immediate release tablet 10 mg Q6H PRN    oxyCODONE immediate release tablet 5 mg Q6H PRN    pantoprazole EC tablet 40 mg BID    predniSONE tablet 20 mg Daily    sodium chloride 0.9% flush 3 mL PRN    sulfamethoxazole-trimethoprim 400-80mg per tablet 1 tablet Every Mon, Wed, Fri    tacrolimus capsule 5 mg  BID    triamcinolone acetonide 0.1% cream BID       Objective:     Vital Signs (Most Recent):  Temp: 98.5 °F (36.9 °C) (08/01/18 1200)  Pulse: (!) 58 (08/01/18 1200)  Resp: 18 (08/01/18 1200)  BP: (!) 103/49 (08/01/18 1200)  SpO2: 98 % (08/01/18 0757)  O2 Device (Oxygen Therapy): room air (08/01/18 1200) Vital Signs (24h Range):  Temp:  [97.6 °F (36.4 °C)-98.5 °F (36.9 °C)] 98.5 °F (36.9 °C)  Pulse:  [47-80] 58  Resp:  [16-20] 18  SpO2:  [94 %-98 %] 98 %  BP: ()/(45-60) 103/49     Weight: 129.8 kg (286 lb 2.5 oz) (08/01/18 0500)  Body mass index is 36.74 kg/m².  Body surface area is 2.6 meters squared.    I/O last 3 completed shifts:  In: 1650 [P.O.:1200; I.V.:200; Other:200; IV Piggyback:50]  Out: 3800 [Other:3800]    Physical Exam   HENT:   Head: Atraumatic.   Neck: No JVD present.   Cardiovascular: Normal rate and regular rhythm.  Exam reveals no friction rub.    Pulmonary/Chest: Effort normal. He has rales (at bases mostly).   Abdominal: Soft. He exhibits no distension.   Musculoskeletal: He exhibits edema.   Neurological: He is alert.   Skin: Skin is warm.

## 2018-08-01 NOTE — ASSESSMENT & PLAN NOTE
Started on RRT, today with increasing sCr, although acid/base and lytes are fine, he is still significantly volume overloaded    - beginning to make some urine, although not adequately recorded (goes on the pad in the bed), however not providing much clearance and likely not making enough from a volume standpoint    - HD in the [facility] today and tentatively again for Friday  - strict Is & Os and serial RFPs  - Avoid nephrotoxic medications  - Hb > 7gm/dL

## 2018-08-01 NOTE — ASSESSMENT & PLAN NOTE
- With CKD3.  - Expected VICKY post txp.  - UOP unclear as pt unable to measure urine (severe scrotal edema).  - Tolerated HD 7/30 PM  - Nephrology on board: placed pt on lasix 100 mg IV BID.  - Tolerated HD today, 8/1.

## 2018-08-01 NOTE — PROGRESS NOTES
Dr Daphne Day with transplant team came to see if pt and is aware of pt hR 48-49, sinus becky at times but asymptomatic

## 2018-08-01 NOTE — SUBJECTIVE & OBJECTIVE
"Interval HPI:   Overnight events: remains in TSU. BG at or slightly above goal overnight. Prednisone 20 mg daily.   Eatin-100%  Nausea: No  Hypoglycemia and intervention: No  Fever: No  TPN and/or TF: No    /64 (BP Location: Right arm)   Pulse 66   Temp 98.5 °F (36.9 °C) (Oral)   Resp 16   Ht 6' 2" (1.88 m)   Wt 126 kg (277 lb 12.5 oz)   SpO2 97%   BMI 35.66 kg/m²     Labs Reviewed and Include      Recent Labs  Lab 18  0430   *   CALCIUM 7.3*   ALBUMIN 2.5*   PROT 4.0*   *   K 4.2   CO2 22*      BUN 33*   CREATININE 2.8*   ALKPHOS 76   ALT 34   AST 16   BILITOT 0.7     Lab Results   Component Value Date    WBC 3.82 (L) 2018    HGB 7.2 (L) 2018    HCT 21.9 (L) 2018    MCV 96 2018    PLT 40 (L) 2018     No results for input(s): TSH, FREET4 in the last 168 hours.  Lab Results   Component Value Date    HGBA1C 5.2 2018       Nutritional status:   Body mass index is 36.74 kg/m².  Lab Results   Component Value Date    ALBUMIN 2.5 (L) 2018    ALBUMIN 2.5 (L) 2018    ALBUMIN 2.8 (L) 2018     Lab Results   Component Value Date    PREALBUMIN 4 (L) 2018    PREALBUMIN 5 (L) 2018    PREALBUMIN 7 (L) 2016       Estimated Creatinine Clearance: 36.5 mL/min (A) (based on SCr of 3 mg/dL (H)).    Accu-Checks  Recent Labs      18   1739  18   2255  18   0821  18   1153  18   1705  18   2043  18   1221  18   1649  18   2030  18   0742   POCTGLUCOSE  210*  206*  142*  162*  161*  185*  144*  139*  189*  148*       Current Medications and/or Treatments Impacting Glycemic Control  Immunotherapy:    Immunosuppressants         Stop Route Frequency     tacrolimus capsule 5 mg      -- Oral 2 times daily     mycophenolate capsule 1,000 mg      -- Oral 2 times daily        Steroids:   Hormones     Start     Stop Route Frequency Ordered    18 0900  predniSONE tablet 20 " mg  (methylprednisolone taper panel)      -- Oral Daily 07/23/18 1326        Pressors:    Autonomic Drugs     None        Hyperglycemia/Diabetes Medications: Antihyperglycemics     Start     Stop Route Frequency Ordered    08/01/18 0900  insulin detemir U-100 pen 9 Units      -- SubQ Daily 07/31/18 0956    07/31/18 1130  insulin aspart U-100 pen 5 Units      -- SubQ 3 times daily with meals 07/31/18 0956    07/27/18 1805  insulin aspart U-100 pen 0-5 Units      -- SubQ Before meals & nightly PRN 07/27/18 1700

## 2018-08-01 NOTE — ASSESSMENT & PLAN NOTE
No previous history of DM  BG goal 140-180    Continue levemir 9 units daily today. Change to Levemir 4 BID, first dose tomorrow.   Continue novolog 5 units with meals.  BG monitoring ac/hs and low dose correction scale.     Discharge:  TBD.

## 2018-08-02 ENCOUNTER — CONFERENCE (OUTPATIENT)
Dept: TRANSPLANT | Facility: CLINIC | Age: 63
End: 2018-08-02

## 2018-08-02 PROBLEM — K70.31 ASCITES DUE TO ALCOHOLIC CIRRHOSIS: Status: RESOLVED | Noted: 2018-06-22 | Resolved: 2018-08-02

## 2018-08-02 LAB
ALBUMIN SERPL BCP-MCNC: 2.5 G/DL
ALP SERPL-CCNC: 76 U/L
ALT SERPL W/O P-5'-P-CCNC: 34 U/L
ANION GAP SERPL CALC-SCNC: 7 MMOL/L
AST SERPL-CCNC: 16 U/L
BACTERIA BLD CULT: NORMAL
BACTERIA BLD CULT: NORMAL
BASOPHILS # BLD AUTO: 0.01 K/UL
BASOPHILS NFR BLD: 0.3 %
BILIRUB SERPL-MCNC: 0.7 MG/DL
BUN SERPL-MCNC: 33 MG/DL
CALCIUM SERPL-MCNC: 7.3 MG/DL
CHLORIDE SERPL-SCNC: 103 MMOL/L
CO2 SERPL-SCNC: 22 MMOL/L
CREAT SERPL-MCNC: 2.8 MG/DL
DIFFERENTIAL METHOD: ABNORMAL
EOSINOPHIL # BLD AUTO: 0.1 K/UL
EOSINOPHIL NFR BLD: 1.6 %
ERYTHROCYTE [DISTWIDTH] IN BLOOD BY AUTOMATED COUNT: 17.3 %
EST. GFR  (AFRICAN AMERICAN): 26.7 ML/MIN/1.73 M^2
EST. GFR  (NON AFRICAN AMERICAN): 23.1 ML/MIN/1.73 M^2
GLUCOSE SERPL-MCNC: 172 MG/DL
HCT VFR BLD AUTO: 21.9 %
HGB BLD-MCNC: 7.2 G/DL
IMM GRANULOCYTES # BLD AUTO: 0.04 K/UL
IMM GRANULOCYTES NFR BLD AUTO: 1 %
LYMPHOCYTES # BLD AUTO: 0.4 K/UL
LYMPHOCYTES NFR BLD: 11.5 %
MAGNESIUM SERPL-MCNC: 1.9 MG/DL
MCH RBC QN AUTO: 31.6 PG
MCHC RBC AUTO-ENTMCNC: 32.9 G/DL
MCV RBC AUTO: 96 FL
MONOCYTES # BLD AUTO: 0.4 K/UL
MONOCYTES NFR BLD: 11 %
NEUTROPHILS # BLD AUTO: 2.9 K/UL
NEUTROPHILS NFR BLD: 74.6 %
NRBC BLD-RTO: 0 /100 WBC
PHOSPHATE SERPL-MCNC: 3.3 MG/DL
PLATELET # BLD AUTO: 40 K/UL
PMV BLD AUTO: 12.7 FL
POCT GLUCOSE: 148 MG/DL (ref 70–110)
POCT GLUCOSE: 164 MG/DL (ref 70–110)
POCT GLUCOSE: 169 MG/DL (ref 70–110)
POCT GLUCOSE: 186 MG/DL (ref 70–110)
POTASSIUM SERPL-SCNC: 4.2 MMOL/L
PROT SERPL-MCNC: 4 G/DL
RBC # BLD AUTO: 2.28 M/UL
SODIUM SERPL-SCNC: 132 MMOL/L
TACROLIMUS BLD-MCNC: 7 NG/ML
WBC # BLD AUTO: 3.82 K/UL

## 2018-08-02 PROCEDURE — 99231 SBSQ HOSP IP/OBS SF/LOW 25: CPT | Mod: ,,, | Performed by: NURSE PRACTITIONER

## 2018-08-02 PROCEDURE — P9047 ALBUMIN (HUMAN), 25%, 50ML: HCPCS | Mod: JG | Performed by: PHYSICIAN ASSISTANT

## 2018-08-02 PROCEDURE — 94664 DEMO&/EVAL PT USE INHALER: CPT

## 2018-08-02 PROCEDURE — 80053 COMPREHEN METABOLIC PANEL: CPT

## 2018-08-02 PROCEDURE — 63600175 PHARM REV CODE 636 W HCPCS: Performed by: SURGERY

## 2018-08-02 PROCEDURE — 83735 ASSAY OF MAGNESIUM: CPT

## 2018-08-02 PROCEDURE — 36415 COLL VENOUS BLD VENIPUNCTURE: CPT

## 2018-08-02 PROCEDURE — 25000003 PHARM REV CODE 250: Performed by: STUDENT IN AN ORGANIZED HEALTH CARE EDUCATION/TRAINING PROGRAM

## 2018-08-02 PROCEDURE — 25000003 PHARM REV CODE 250: Performed by: PHYSICIAN ASSISTANT

## 2018-08-02 PROCEDURE — 63600175 PHARM REV CODE 636 W HCPCS: Performed by: STUDENT IN AN ORGANIZED HEALTH CARE EDUCATION/TRAINING PROGRAM

## 2018-08-02 PROCEDURE — 20600001 HC STEP DOWN PRIVATE ROOM

## 2018-08-02 PROCEDURE — 63600175 PHARM REV CODE 636 W HCPCS: Performed by: INTERNAL MEDICINE

## 2018-08-02 PROCEDURE — 25000003 PHARM REV CODE 250: Performed by: SURGERY

## 2018-08-02 PROCEDURE — 84100 ASSAY OF PHOSPHORUS: CPT

## 2018-08-02 PROCEDURE — 99232 SBSQ HOSP IP/OBS MODERATE 35: CPT | Mod: GC,,, | Performed by: INTERNAL MEDICINE

## 2018-08-02 PROCEDURE — 63600175 PHARM REV CODE 636 W HCPCS: Performed by: PEDIATRICS

## 2018-08-02 PROCEDURE — 63600175 PHARM REV CODE 636 W HCPCS: Mod: JG | Performed by: PHYSICIAN ASSISTANT

## 2018-08-02 PROCEDURE — 63600175 PHARM REV CODE 636 W HCPCS: Performed by: NURSE PRACTITIONER

## 2018-08-02 PROCEDURE — 85025 COMPLETE CBC W/AUTO DIFF WBC: CPT

## 2018-08-02 PROCEDURE — 80197 ASSAY OF TACROLIMUS: CPT

## 2018-08-02 RX ORDER — ALBUMIN HUMAN 250 G/1000ML
25 SOLUTION INTRAVENOUS ONCE
Status: COMPLETED | OUTPATIENT
Start: 2018-08-02 | End: 2018-08-02

## 2018-08-02 RX ADMIN — ACYCLOVIR 200 MG: 200 CAPSULE ORAL at 08:08

## 2018-08-02 RX ADMIN — LEVETIRACETAM 500 MG: 500 TABLET ORAL at 08:08

## 2018-08-02 RX ADMIN — INSULIN ASPART 5 UNITS: 100 INJECTION, SOLUTION INTRAVENOUS; SUBCUTANEOUS at 07:08

## 2018-08-02 RX ADMIN — PREDNISONE 20 MG: 20 TABLET ORAL at 08:08

## 2018-08-02 RX ADMIN — TRIAMCINOLONE ACETONIDE: 1 CREAM TOPICAL at 08:08

## 2018-08-02 RX ADMIN — FUROSEMIDE 100 MG: 10 INJECTION, SOLUTION INTRAVENOUS at 08:08

## 2018-08-02 RX ADMIN — PANTOPRAZOLE SODIUM 40 MG: 40 TABLET, DELAYED RELEASE ORAL at 08:08

## 2018-08-02 RX ADMIN — CEFTRIAXONE SODIUM 2 G: 2 INJECTION, POWDER, FOR SOLUTION INTRAMUSCULAR; INTRAVENOUS at 05:08

## 2018-08-02 RX ADMIN — DOCUSATE SODIUM 100 MG: 100 CAPSULE, LIQUID FILLED ORAL at 08:08

## 2018-08-02 RX ADMIN — HEPARIN SODIUM 5000 UNITS: 5000 INJECTION, SOLUTION INTRAVENOUS; SUBCUTANEOUS at 03:08

## 2018-08-02 RX ADMIN — INSULIN ASPART 5 UNITS: 100 INJECTION, SOLUTION INTRAVENOUS; SUBCUTANEOUS at 05:08

## 2018-08-02 RX ADMIN — TACROLIMUS 5 MG: 5 CAPSULE ORAL at 05:08

## 2018-08-02 RX ADMIN — MYCOPHENOLATE MOFETIL 1000 MG: 250 CAPSULE ORAL at 08:08

## 2018-08-02 RX ADMIN — HEPARIN SODIUM 5000 UNITS: 5000 INJECTION, SOLUTION INTRAVENOUS; SUBCUTANEOUS at 04:08

## 2018-08-02 RX ADMIN — ATORVASTATIN CALCIUM 40 MG: 20 TABLET, FILM COATED ORAL at 08:08

## 2018-08-02 RX ADMIN — INSULIN ASPART 5 UNITS: 100 INJECTION, SOLUTION INTRAVENOUS; SUBCUTANEOUS at 11:08

## 2018-08-02 RX ADMIN — ALBUMIN HUMAN 25 G: 0.25 SOLUTION INTRAVENOUS at 05:08

## 2018-08-02 RX ADMIN — ERGOCALCIFEROL 50000 UNITS: 1.25 CAPSULE ORAL at 08:08

## 2018-08-02 RX ADMIN — FLUCONAZOLE 200 MG: 200 TABLET ORAL at 08:08

## 2018-08-02 RX ADMIN — TACROLIMUS 5 MG: 5 CAPSULE ORAL at 08:08

## 2018-08-02 RX ADMIN — FUROSEMIDE 100 MG: 10 INJECTION, SOLUTION INTRAVENOUS at 05:08

## 2018-08-02 RX ADMIN — INSULIN DETEMIR 9 UNITS: 100 INJECTION, SOLUTION SUBCUTANEOUS at 08:08

## 2018-08-02 RX ADMIN — MYCOPHENOLATE MOFETIL 1000 MG: 250 CAPSULE ORAL at 10:08

## 2018-08-02 RX ADMIN — HEPARIN SODIUM 5000 UNITS: 5000 INJECTION, SOLUTION INTRAVENOUS; SUBCUTANEOUS at 08:08

## 2018-08-02 NOTE — PLAN OF CARE
Problem: Patient Care Overview  Goal: Plan of Care Review  Outcome: Ongoing (interventions implemented as appropriate)  -Pt AAOx4  -Vital signs stable  -ACHS BG monitoring, HS BG was 189, no coverage indicated  -Telemetry monitoring NSR/SB  -Weight shifted Q2 hours to prevent DI  -mom at bedside   -Bed lowered, locked, side rails up x2, and call bell in reach    See flowsheet for assessment findings.  Will continue to monitor pt.

## 2018-08-02 NOTE — PROGRESS NOTES
Cordis with triple lumen removed - catheter and sutures intact.  Vaseline gauze and dry gauze to site. Dressing to second central line - triaylsis - changed at same time.  Pt instructed to lay flat for 30 minutes - mother and patient both verified understanding

## 2018-08-02 NOTE — ASSESSMENT & PLAN NOTE
- With CKD3.  - Expected VICKY post txp.  - UOP unclear as pt unable to measure urine (severe scrotal edema).  - Tolerated HD 8/1 PM  - Nephrology on board: placed pt on lasix 100 mg IV BID

## 2018-08-02 NOTE — PROGRESS NOTES
"[facility]  Endocrinology  Progress Note    Admit Date: 2018     Reason for Consult: Management of hypothermia and followed by consult on steroid induced hyperglycemia     Diabetes diagnosis year: none      HPI:   Patient is a 63 y.o. 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 Hospital course was complicated by hepatic encephalopathy. During the course, he has also had few episodes of hypothermia with temp as low as 93 F. He completed 7 day course of vancomycin for cellilitis, and is currently on vancomycin and cefepime for possible sepsis. However he continues to be hypothermic.     Further work-up of hypothermia was done, which showed TSH: 2.714, FT4: 0.53, AM cortisol: 9.9.    Interval HPI:   Overnight events: remains in TSU. BG at or slightly above goal overnight. Prednisone 20 mg daily.   Eatin-100%  Nausea: No  Hypoglycemia and intervention: No  Fever: No  TPN and/or TF: No    /64 (BP Location: Right arm)   Pulse 66   Temp 98.5 °F (36.9 °C) (Oral)   Resp 16   Ht 6' 2" (1.88 m)   Wt 126 kg (277 lb 12.5 oz)   SpO2 97%   BMI 35.66 kg/m²       Labs Reviewed and Include      Recent Labs  Lab 18  0430   *   CALCIUM 7.3*   ALBUMIN 2.5*   PROT 4.0*   *   K 4.2   CO2 22*      BUN 33*   CREATININE 2.8*   ALKPHOS 76   ALT 34   AST 16   BILITOT 0.7     Lab Results   Component Value Date    WBC 3.82 (L) 2018    HGB 7.2 (L) 2018    HCT 21.9 (L) 2018    MCV 96 2018    PLT 40 (L) 2018     No results for input(s): TSH, FREET4 in the last 168 hours.  Lab Results   Component Value Date    HGBA1C 5.2 2018       Nutritional " status:   Body mass index is 36.74 kg/m².  Lab Results   Component Value Date    ALBUMIN 2.5 (L) 08/02/2018    ALBUMIN 2.5 (L) 08/01/2018    ALBUMIN 2.8 (L) 07/31/2018     Lab Results   Component Value Date    PREALBUMIN 4 (L) 06/30/2018    PREALBUMIN 5 (L) 01/22/2018    PREALBUMIN 7 (L) 08/31/2016       Estimated Creatinine Clearance: 36.5 mL/min (A) (based on SCr of 3 mg/dL (H)).    Accu-Checks  Recent Labs      07/30/18   1739  07/30/18   2255  07/31/18   0821  07/31/18   1153  07/31/18   1705  07/31/18   2043  08/01/18   1221  08/01/18   1649  08/01/18   2030  08/02/18   0742   POCTGLUCOSE  210*  206*  142*  162*  161*  185*  144*  139*  189*  148*       Current Medications and/or Treatments Impacting Glycemic Control  Immunotherapy:    Immunosuppressants         Stop Route Frequency     tacrolimus capsule 5 mg      -- Oral 2 times daily     mycophenolate capsule 1,000 mg      -- Oral 2 times daily        Steroids:   Hormones     Start     Stop Route Frequency Ordered    07/29/18 0900  predniSONE tablet 20 mg  (methylprednisolone taper panel)      -- Oral Daily 07/23/18 1326        Pressors:    Autonomic Drugs     None        Hyperglycemia/Diabetes Medications: Antihyperglycemics     Start     Stop Route Frequency Ordered    08/01/18 0900  insulin detemir U-100 pen 9 Units      -- SubQ Daily 07/31/18 0956    07/31/18 1130  insulin aspart U-100 pen 5 Units      -- SubQ 3 times daily with meals 07/31/18 0956    07/27/18 1805  insulin aspart U-100 pen 0-5 Units      -- SubQ Before meals & nightly PRN 07/27/18 1705          ASSESSMENT and PLAN    * Liver transplanted      S/p transplanted.  Currently on scheduled steroid taper.  Per transplant  Avoid hypoglycemia.         Steroid-induced hyperglycemia    No previous history of DM  BG goal 140-180    Continue levemir 9 units daily today. Change to Levemir 4 BID, first dose tomorrow.   Continue novolog 5 units with meals.  BG monitoring ac/hs and low dose correction  scale.     Discharge:  TBD.          Long-term use of immunosuppressant medication    May increase insulin resistance.             Stage II pressure ulcer of sacral region    Optimize BG.           CKD (chronic kidney disease), stage III    Avoid insulin stacking and hypoglycemia.  Lab Results   Component Value Date    CREATININE 2.8 (H) 08/02/2018               Acute kidney injury superimposed on chronic kidney disease      Avoid insulin stacking            Alma Snyder NP  Endocrinology  [facility]

## 2018-08-02 NOTE — PROGRESS NOTES
[facility]  Liver Transplant  Progress Note    Patient Name: Alon Adamson  MRN: [id number]  Admission Date: 2018  Hospital Length of Stay: 41 days  Code Status: Full Code  Primary Care Provider: Mckinley Vides MD  Post-Operative Day: 10    ORGAN:   LIVER  Disease Etiology: Alcoholic Cirrhosis  Donor Type:    - Brain Death  CDC High Risk:   No  Donor CMV Status:   Donor CMV Status: Negative  Donor HBcAB:   Negative  Donor HCV Status:   Negative  Donor HBV TAMAR: Negative  Donor HCV TAMAR: Negative  Whole or Partial: Whole Liver  Biliary Anastomosis: End to End  Arterial Anatomy: Standard  Subjective:     History of Present Illness:  Mr. Adamson is a 64 yo M with PMH ESLD secondary to ETOH cirrhosis, CKD3, CAD.  Complications of liver disease include hyperbilirubinemia, hypoalbuminemia, severe malnutrition,   hepatic encephalopathy, thrombocytopenia, splenomegaly, ascites, hypervolemia, coagulopathy, portal HTN.  Pt admitted  for acutely worsening hepatic encephalopathy and concern for BLE cellulitis as well as severe volume overload.  Complications of ELSD managed while inpt and he received liver offer on 18.  Pt underwent liver transplant without complication: steroid induction, DBD, CMV -/-.  Donor with E coli bacteremia resistant to cipro placed on Rocephin x 2 weeks per ID recs.  Also on fluconazole x 30 days as pt very ill prior to transplant. Post transplant, with nice trend down of AST/ALT and LFTs.  Liver U/S with elevated RIs and decreased HAV.  During initial post operative period, pt required pressor support.  Pt with expected post operative VICKY as with VICKY prior to surgery.  Nephrology consulted and pt placed on intermittent CRRT.  Pressor requirement decreased and pt tolerated CRRT without pressors.  Pt with asymptomatic bradycardia while in ICU prompting cardiology consult. Pt without need for acute intervention but atropine placed at bedside.  HR has improved POD  7 and 8.  He was transferred to the TSU 7/30 PM.          Hospital Course:  Interval history: Pt feeling ok today. Tolerated HD well yesterday.  Liver US 7/31 showed small fluid collection likely an adrenal hemorrhage vs hematoma. H&H decreased to 6.6/20.3- responded to 1 u PRBCs. H/H stable this AM. Will likely benefit from epo.  Pt continues to have difficulty with urinating due to scrotal edema. 2 unmeasured uop recorded. Pt states he feels like he is urinating more. Continue lasix 100 mg bid. Continue PT/OT and encourage supplements for malnutrition.     Scheduled Meds:   sodium chloride 0.9%   Intravenous Once    sodium chloride 0.9%   Intravenous Once    acyclovir  200 mg Oral BID    atorvastatin  40 mg Oral Daily    cefTRIAXone (ROCEPHIN) IVPB  2 g Intravenous Q24H    docusate sodium  100 mg Oral Daily    ergocalciferol  50,000 Units Oral Q7 Days    fluconazole  200 mg Oral Daily    furosemide  100 mg Intravenous BID    heparin (porcine)  5,000 Units Subcutaneous Q8H    insulin aspart U-100  5 Units Subcutaneous TIDWM    [START ON 8/3/2018] insulin detemir U-100  4 Units Subcutaneous BID    levETIRAcetam  500 mg Oral BID    mycophenolate  1,000 mg Oral BID    pantoprazole  40 mg Oral BID    predniSONE  20 mg Oral Daily    sulfamethoxazole-trimethoprim 400-80mg  1 tablet Oral Every Mon, Wed, Fri    tacrolimus  5 mg Oral BID    triamcinolone acetonide 0.1%   Topical (Top) BID     Continuous Infusions:  PRN Meds:sodium chloride, sodium chloride 0.9%, sodium chloride 0.9%, sodium chloride 0.9%, dextrose 50%, dextrose 50%, gelatin adsorbable 100cm top sponge, glucagon (human recombinant), glucose, glucose, insulin aspart U-100, ondansetron, oxyCODONE, oxyCODONE, sodium chloride 0.9%    Review of Systems   Constitutional: Positive for activity change, appetite change and fatigue. Negative for chills, diaphoresis and fever.   HENT: Negative for congestion and facial swelling.    Eyes: Negative for  pain, discharge and visual disturbance.   Respiratory: Positive for cough. Negative for chest tightness, shortness of breath and wheezing.    Cardiovascular: Positive for leg swelling. Negative for chest pain and palpitations.   Gastrointestinal: Positive for abdominal distention. Negative for abdominal pain, constipation, diarrhea, nausea and vomiting.   Endocrine: Negative.    Genitourinary: Positive for decreased urine volume.   Musculoskeletal: Negative for back pain.   Skin: Positive for wound.   Allergic/Immunologic: Positive for immunocompromised state.   Neurological: Positive for weakness. Negative for dizziness, tremors and headaches.   Hematological: Negative.    Psychiatric/Behavioral: Negative for agitation, dysphoric mood and sleep disturbance. The patient is not nervous/anxious.      Objective:     Vital Signs (Most Recent):  Temp: 98.4 °F (36.9 °C) (08/02/18 1150)  Pulse: 105 (08/02/18 1527)  Resp: 16 (08/02/18 0745)  BP: (!) 124/59 (08/02/18 1150)  SpO2: 97 % (08/02/18 1150) Vital Signs (24h Range):  Temp:  [97.9 °F (36.6 °C)-98.5 °F (36.9 °C)] 98.4 °F (36.9 °C)  Pulse:  [] 105  Resp:  [16-18] 16  SpO2:  [96 %-97 %] 97 %  BP: (101-141)/(53-66) 124/59     Weight: 126 kg (277 lb 12.5 oz)  Body mass index is 35.66 kg/m².    Intake/Output - Last 3 Shifts       07/31 0700 - 08/01 0659 08/01 0700 - 08/02 0659 08/02 0700 - 08/03 0659    P.O. 720 1200     I.V. (mL/kg) 100 (0.8)      Blood  300     IV Piggyback 50 50     Total Intake(mL/kg) 870 (6.7) 1550 (12.3)     Urine (mL/kg/hr)  0 (0)     Emesis/NG output  0 (0)     Other  1600 (0.5)     Stool  0 (0)     Blood  0 (0)     Total Output   1600      Net +870 -50             Urine Occurrence 8 x 2 x     Stool Occurrence 3 x 2 x     Emesis Occurrence  0 x           Physical Exam   Constitutional: He is oriented to person, place, and time. He appears well-developed. No distress.   Temporal and distal extremity muscle wasting   HENT:   Head: Normocephalic  and atraumatic.   Eyes: EOM are normal. Pupils are equal, round, and reactive to light. No scleral icterus.   Cardiovascular: Normal rate, regular rhythm and normal heart sounds.    Pulmonary/Chest: Effort normal.   Coarse BS   Abdominal: Soft. Bowel sounds are normal. He exhibits distension (ascites). There is no tenderness.   - [employer] cdi no ssi   Musculoskeletal: He exhibits edema (3+ BLE).   Neurological: He is alert and oriented to person, place, and time.   Skin: Skin is warm and dry. He is not diaphoretic.   Psychiatric: He has a normal mood and affect. His behavior is normal. Judgment and thought content normal.   Nursing note and vitals reviewed.      Laboratory:  Immunosuppressants         Stop Route Frequency     tacrolimus capsule 5 mg      -- Oral 2 times daily     mycophenolate capsule 1,000 mg      -- Oral 2 times daily        CBC:     Recent Labs  Lab 08/02/18  0430   WBC 3.82*   RBC 2.28*   HGB 7.2*   HCT 21.9*   PLT 40*   MCV 96   MCH 31.6*   MCHC 32.9     CMP:     Recent Labs  Lab 08/02/18  0430   *   CALCIUM 7.3*   ALBUMIN 2.5*   PROT 4.0*   *   K 4.2   CO2 22*      BUN 33*   CREATININE 2.8*   ALKPHOS 76   ALT 34   AST 16   BILITOT 0.7     Labs within the past 24 hours have been reviewed.    Diagnostic Results:  I have personally reviewed all pertinent imaging studies.  None    Assessment/Plan:     * Liver transplanted    - LFTs/enzymes normalized  - POD 1 U/s with elevated RIs  - Repeat liver U/S with improvement        Long-term use of immunosuppressant medication    - see prophylactic immuno.           Prophylactic immunotherapy    - continue prograf, cellcept and prednisone. Monitor labs daily and adjust dose accordingly for a therapeutic level.           At risk for opportunistic infections    - Continue Bactrim, acyclovir, fluconazole          Sinus bradycardia    - Improved  - Atropine at bedside if HR decreases again          Positive blood culture in cadaveric  donor    - E coli bacteremia in donor resistant to Cipro  - Rocephin x 14 days --> start date 7/23        Steroid-induced hyperglycemia    - Endocrine consulted          CKD (chronic kidney disease), stage III    - See VICKY.          Severe protein-calorie malnutrition    - Dietary consulted.  - Continue supplements.  - Pt eating well.        Physical deconditioning    - PT/OT daily.         Pancytopenia    - Related to decompensated liver disease --> should improve now that pt transplanted.        Acute kidney injury superimposed on chronic kidney disease    - With CKD3.  - Expected VICKY post txp.  - UOP unclear as pt unable to measure urine (severe scrotal edema).  - Tolerated HD 8/1 PM  - Nephrology on board: placed pt on lasix 100 mg IV BID        Decompensated hepatic cirrhosis    - Placed status 7 on 6/26 for prescription drug coverage change. Re-activated 7/3. Back on hold d/t frailty and decompensation at the end of last week prompting ICU transfer. Now off hold 7/17.  Plan to submit MELD exception points. Will remeld today at 27.       MELD-Na score: 27 at 7/21/2018  4:01 AM  MELD score: 27 at 7/21/2018  4:01 AM  Calculated from:  Serum Creatinine: 3.7 mg/dL at 7/21/2018  4:00 AM  Serum Sodium: 147 mmol/L (Rounded to 137) at 7/21/2018  4:00 AM  Total Bilirubin: 1.6 mg/dL at 7/21/2018  4:00 AM  INR(ratio): 1.7 at 7/21/2018  4:01 AM  Age: 63 years        Coronary artery disease involving native coronary artery of native heart without angina pectoris    - Continue home ASA.   - 2D Echo 7/9 normal EF (55-60%), trivial tricuspid/mitral regurg.        Anemia of chronic disease    - H&H 6.6/20.3 8/1: Transfused 1 unit PRBCs  - H/H stable today  - Will likely benefit from epo        Thrombocytopenia    - Secondary to splenomegaly from portal HTN- should improve with txp.  - No s/s overt bleed.        Seizures    - Continue oral Keppra.  - Keep Mg > 2.        Bilateral edema of lower extremity    - continue lasix 100  mg IV BID.  - Continue intermittent dialysis.   - Tolerated HD well 8/1.   - Albumin 25% 100 cc x 1 today        Hepatic encephalopathy    - ESLD secondary to EtOH cirrhosis with severe complications (hyperbilirubinemia, hypoalbuminemia, severe malnutrition, hepatic encephalopathy, thrombocytopenia, splenomegaly, ascites, hypervolemia, coaguloopathy, portal HTN), seizure disorder on Keppra, CKD3, CAD   - has frequent episodes of lethargy/confusion/slowing when holding lactulose   - admitted to SICU 7/13/18 for worsening encephalopathy and hypothermia  - PO lactulose TID, PRN lactulose enemas, Rifaximin  - sleepy this AM. Given lactulose enema with improvement in mentation. Continue oral lactulose and PRN lactulose enemas for HE control.               VTE Risk Mitigation         Ordered     heparin (porcine) injection 5,000 Units  Every 8 hours      07/26/18 1330     Place sequential compression device  Until discontinued      07/23/18 1326     IP VTE HIGH RISK PATIENT  Once      07/23/18 1242          The patients clinical status was discussed at multidisplinary rounds, involving transplant surgery, transplant medicine, pharmacy, nursing, nutrition, and social work    Discharge Planning:  Not currently a d/c candidate    Bebeto Virgen PA-C  Liver Transplant  [facility]

## 2018-08-02 NOTE — ASSESSMENT & PLAN NOTE
Avoid insulin stacking and hypoglycemia.  Lab Results   Component Value Date    CREATININE 2.8 (H) 08/02/2018

## 2018-08-02 NOTE — SUBJECTIVE & OBJECTIVE
Interval History: HD yesterday in the [facility], some issues with clotting and only 1L removed, but despite this with a neutral fluid balance over past 24hrs, no distress, no UOP recorded, however taking to patient and family at bedside, appears he is making plenty of urine but it it not recorded accurately as he goes on the pad in the bed and when having BMs    Review of patient's allergies indicates:   Allergen Reactions    Nsaids (non-steroidal anti-inflammatory drug)      D/t to liver disease.    Tylenol [acetaminophen]      D/t liver disease.    Penicillins Other (See Comments)     Tolerated pip-tazo in 8/2016 without issue  Tolerated cefepime 7/2018 without issue  Since childhood was told not to take it     Current Facility-Administered Medications   Medication Frequency    0.9%  NaCl infusion (for blood administration) Q24H PRN    0.9%  NaCl infusion PRN    0.9%  NaCl infusion Once    0.9%  NaCl infusion PRN    0.9%  NaCl infusion Once    0.9%  NaCl infusion PRN    acyclovir capsule 200 mg BID    atorvastatin tablet 40 mg Daily    cefTRIAXone (ROCEPHIN) 2 g in dextrose 5 % 50 mL IVPB Q24H    dextrose 50% injection 12.5 g PRN    dextrose 50% injection 25 g PRN    docusate sodium capsule 100 mg Daily    ergocalciferol capsule 50,000 Units Q7 Days    fluconazole tablet 200 mg Daily    furosemide injection 100 mg BID    gelatin adsorbable 100cm top sponge 100 sponge 1 applicator PRN    glucagon (human recombinant) injection 1 mg PRN    glucose chewable tablet 16 g PRN    glucose chewable tablet 24 g PRN    heparin (porcine) injection 5,000 Units Q8H    insulin aspart U-100 pen 0-5 Units QID (AC + HS) PRN    insulin aspart U-100 pen 5 Units TIDWM    insulin detemir U-100 pen 9 Units Daily    levETIRAcetam tablet 500 mg BID    mycophenolate capsule 1,000 mg BID    ondansetron injection 4 mg Q8H PRN    oxyCODONE immediate release tablet 10 mg Q6H PRN    oxyCODONE immediate release tablet 5  mg Q6H PRN    pantoprazole EC tablet 40 mg BID    predniSONE tablet 20 mg Daily    sodium chloride 0.9% flush 3 mL PRN    sulfamethoxazole-trimethoprim 400-80mg per tablet 1 tablet Every Mon, Wed, Fri    tacrolimus capsule 5 mg BID    triamcinolone acetonide 0.1% cream BID       Objective:     Vital Signs (Most Recent):  Temp: 98.4 °F (36.9 °C) (08/02/18 1150)  Pulse: 65 (08/02/18 1150)  Resp: 16 (08/02/18 0745)  BP: (!) 124/59 (08/02/18 1150)  SpO2: 97 % (08/02/18 1150)  O2 Device (Oxygen Therapy): room air (08/02/18 1150) Vital Signs (24h Range):  Temp:  [97.9 °F (36.6 °C)-98.5 °F (36.9 °C)] 98.4 °F (36.9 °C)  Pulse:  [56-76] 65  Resp:  [16-18] 16  SpO2:  [96 %-97 %] 97 %  BP: (101-141)/(53-66) 124/59     Weight: 126 kg (277 lb 12.5 oz) (08/02/18 0400)  Body mass index is 35.66 kg/m².  Body surface area is 2.56 meters squared.    I/O last 3 completed shifts:  In: 1850 [P.O.:1500; Blood:300; IV Piggyback:50]  Out: 1600 [Other:1600]    Physical Exam   Constitutional: He is oriented to person, place, and time.   HENT:   Head: Atraumatic.   Neck: No JVD present.   Cardiovascular: Normal rate and regular rhythm.  Exam reveals no friction rub.    Pulmonary/Chest: Effort normal. He has rales (at bases).   Abdominal: Soft. He exhibits no distension.   Musculoskeletal: He exhibits edema.   Neurological: He is alert and oriented to person, place, and time.   Skin: Skin is warm.

## 2018-08-02 NOTE — PROGRESS NOTES
[facility]  Nephrology  Progress Note    Patient Name: Alon Adamson  MRN: [id number]  Admission Date: 6/22/2018  Hospital Length of Stay: 41 days  Attending Provider: Jair Sheridan MD   Primary Care Physician: Mckinley Vides MD  Principal Problem:Liver transplanted    Subjective:     HPI: Mr. Adamson is 63 yr old male with decompensated alcoholic cirrhosis, CKD III and CAD admitted here on 06/22/18 admitted for bilateral LE hypervolemia and VICKY on CKD III with cr of 2.3 baseline around 1.5. Patient has multiple hospitalization in the past secondary to decompensated liver failure. Patient is currently on transplant team. During current admission patient was getting lasix and albumin intermittently for VICKY however renal function was not getting better. Patient hospital course complicated by hepatic encephalopathy with some improvement of mentation with lactulose. Also treated for cellulitis with 7 days of vancomycin.Nephrology is consulted for worsening/not improving VICKY despite lasix/albumin.     Per chart review patient has no hypotensive episodes, BP mostly above 100's. Has not received nephrotoxins such as NSAIDs/contrast media. No sever sepsis/septic shock or acute blood loss during current admit. UA with 2+ leukocytes and Hyaline casts, no wbc/rbc cast or proteinuria. Urine Na+ < 20.    Patient was transferred to ICU on 7/13/2018 after being more lethargic and hypoactive.  After two days was stepped down back to [facility].       Interval History: HD yesterday in the [facility], some issues with clotting and only 1L removed, but despite this with a neutral fluid balance over past 24hrs, no distress, no UOP recorded, however taking to patient and family at bedside, appears he is making plenty of urine but it it not recorded accurately as he goes on the pad in the bed and when having BMs    Review of patient's allergies indicates:   Allergen Reactions    Nsaids (non-steroidal  anti-inflammatory drug)      D/t to liver disease.    Tylenol [acetaminophen]      D/t liver disease.    Penicillins Other (See Comments)     Tolerated pip-tazo in 8/2016 without issue  Tolerated cefepime 7/2018 without issue  Since childhood was told not to take it     Current Facility-Administered Medications   Medication Frequency    0.9%  NaCl infusion (for blood administration) Q24H PRN    0.9%  NaCl infusion PRN    0.9%  NaCl infusion Once    0.9%  NaCl infusion PRN    0.9%  NaCl infusion Once    0.9%  NaCl infusion PRN    acyclovir capsule 200 mg BID    atorvastatin tablet 40 mg Daily    cefTRIAXone (ROCEPHIN) 2 g in dextrose 5 % 50 mL IVPB Q24H    dextrose 50% injection 12.5 g PRN    dextrose 50% injection 25 g PRN    docusate sodium capsule 100 mg Daily    ergocalciferol capsule 50,000 Units Q7 Days    fluconazole tablet 200 mg Daily    furosemide injection 100 mg BID    gelatin adsorbable 100cm top sponge 100 sponge 1 applicator PRN    glucagon (human recombinant) injection 1 mg PRN    glucose chewable tablet 16 g PRN    glucose chewable tablet 24 g PRN    heparin (porcine) injection 5,000 Units Q8H    insulin aspart U-100 pen 0-5 Units QID (AC + HS) PRN    insulin aspart U-100 pen 5 Units TIDWM    insulin detemir U-100 pen 9 Units Daily    levETIRAcetam tablet 500 mg BID    mycophenolate capsule 1,000 mg BID    ondansetron injection 4 mg Q8H PRN    oxyCODONE immediate release tablet 10 mg Q6H PRN    oxyCODONE immediate release tablet 5 mg Q6H PRN    pantoprazole EC tablet 40 mg BID    predniSONE tablet 20 mg Daily    sodium chloride 0.9% flush 3 mL PRN    sulfamethoxazole-trimethoprim 400-80mg per tablet 1 tablet Every Mon, Wed, Fri    tacrolimus capsule 5 mg BID    triamcinolone acetonide 0.1% cream BID       Objective:     Vital Signs (Most Recent):  Temp: 98.4 °F (36.9 °C) (08/02/18 1150)  Pulse: 65 (08/02/18 1150)  Resp: 16 (08/02/18 0745)  BP: (!) 124/59 (08/02/18  1150)  SpO2: 97 % (08/02/18 1150)  O2 Device (Oxygen Therapy): room air (08/02/18 1150) Vital Signs (24h Range):  Temp:  [97.9 °F (36.6 °C)-98.5 °F (36.9 °C)] 98.4 °F (36.9 °C)  Pulse:  [56-76] 65  Resp:  [16-18] 16  SpO2:  [96 %-97 %] 97 %  BP: (101-141)/(53-66) 124/59     Weight: 126 kg (277 lb 12.5 oz) (08/02/18 0400)  Body mass index is 35.66 kg/m².  Body surface area is 2.56 meters squared.    I/O last 3 completed shifts:  In: 1850 [P.O.:1500; Blood:300; IV Piggyback:50]  Out: 1600 [Other:1600]    Physical Exam   Constitutional: He is oriented to person, place, and time.   HENT:   Head: Atraumatic.   Neck: No JVD present.   Cardiovascular: Normal rate and regular rhythm.  Exam reveals no friction rub.    Pulmonary/Chest: Effort normal. He has rales (at bases).   Abdominal: Soft. He exhibits no distension.   Musculoskeletal: He exhibits edema.   Neurological: He is alert and oriented to person, place, and time.   Skin: Skin is warm.       Assessment/Plan:     CKD (chronic kidney disease), stage III    No with acute component and requiring dialysis (see VICKY section)        Acute kidney injury superimposed on chronic kidney disease    Started on RRT, today with increasing sCr, although acid/base and lytes are fine, he is still significantly volume overloaded    - continues to make urine and output may be increasing, although not adequately recorded (goes on the pad in the bed), however not providing much clearance and likely not making enough from a volume standpoint    - hold on RRT tomorrow and decide on need in the morning, depending on labs, exam and estimate of urine output  - strict Is & Os and serial RFPs  - Avoid nephrotoxic medications  - Hb > 7gm/dL            Thank you for your consult. I will follow-up with patient. Please contact us if you have any additional questions.    Elan Magana MD  Nephrology  [facility]    ATTENDING PHYSICIAN ATTESTATION  I have personally  interviewed and examined the patient. I thoroughly reviewed the demographic, clinical, laboratorial and imaging information available in medical records. I agree with the assessment and recommendations provided by the subspecialty resident. Dr. Magana  was under my supervision.

## 2018-08-02 NOTE — ASSESSMENT & PLAN NOTE
- continue lasix 100 mg IV BID.  - Continue intermittent dialysis.   - Tolerated HD well 8/1.   - Albumin 25% 100 cc x 1 today

## 2018-08-02 NOTE — PLAN OF CARE
Problem: Patient Care Overview  Goal: Plan of Care Review  Outcome: Ongoing (interventions implemented as appropriate)  Pt denies pain, but c/o slight discomfort near incision site when turning/stretching. Betadine applied to incision site. Pt had 2 loose BM. Attempted to ambulate the pt but was not able to. Pt was able to sit on the side of the bed. Cortus removed in the afternoon. Pressure was applied for 5 min and pt instructed to lie flat for 20 min. Vaseline gauze and tegaderm applied to site. +4 pitting edema to kavon lower extremities. Cream and stockings wrapped on both legs by wound care nurse.Turned pt and encouraged pt to turn q2h to prevent more skin breakdown. Pt continues to c/o pain on bottom, skin red. Barrier cream applied after changing pt. Pt mother able to pull self meds 100%. Fall precautions in place, siderails up x 3, nonskid socks in place, call bell within reach. Pt instructed to call for assistance before attempting to get up/ambulate. Verbalized understanding. Pt mother at bedside.

## 2018-08-02 NOTE — ASSESSMENT & PLAN NOTE
Started on RRT, today with increasing sCr, although acid/base and lytes are fine, he is still significantly volume overloaded    - continues to make urine and output may be increasing, although not adequately recorded (goes on the pad in the bed), however not providing much clearance and likely not making enough from a volume standpoint    - hold on RRT tomorrow and decide on need in the morning, depending on labs, exam and estimate of urine output  - strict Is & Os and serial RFPs  - Avoid nephrotoxic medications  - Hb > 7gm/dL

## 2018-08-02 NOTE — SUBJECTIVE & OBJECTIVE
Scheduled Meds:   sodium chloride 0.9%   Intravenous Once    sodium chloride 0.9%   Intravenous Once    acyclovir  200 mg Oral BID    atorvastatin  40 mg Oral Daily    cefTRIAXone (ROCEPHIN) IVPB  2 g Intravenous Q24H    docusate sodium  100 mg Oral Daily    ergocalciferol  50,000 Units Oral Q7 Days    fluconazole  200 mg Oral Daily    furosemide  100 mg Intravenous BID    heparin (porcine)  5,000 Units Subcutaneous Q8H    insulin aspart U-100  5 Units Subcutaneous TIDWM    [START ON 8/3/2018] insulin detemir U-100  4 Units Subcutaneous BID    levETIRAcetam  500 mg Oral BID    mycophenolate  1,000 mg Oral BID    pantoprazole  40 mg Oral BID    predniSONE  20 mg Oral Daily    sulfamethoxazole-trimethoprim 400-80mg  1 tablet Oral Every Mon, Wed, Fri    tacrolimus  5 mg Oral BID    triamcinolone acetonide 0.1%   Topical (Top) BID     Continuous Infusions:  PRN Meds:sodium chloride, sodium chloride 0.9%, sodium chloride 0.9%, sodium chloride 0.9%, dextrose 50%, dextrose 50%, gelatin adsorbable 100cm top sponge, glucagon (human recombinant), glucose, glucose, insulin aspart U-100, ondansetron, oxyCODONE, oxyCODONE, sodium chloride 0.9%    Review of Systems   Constitutional: Positive for activity change, appetite change and fatigue. Negative for chills, diaphoresis and fever.   HENT: Negative for congestion and facial swelling.    Eyes: Negative for pain, discharge and visual disturbance.   Respiratory: Positive for cough. Negative for chest tightness, shortness of breath and wheezing.    Cardiovascular: Positive for leg swelling. Negative for chest pain and palpitations.   Gastrointestinal: Positive for abdominal distention. Negative for abdominal pain, constipation, diarrhea, nausea and vomiting.   Endocrine: Negative.    Genitourinary: Positive for decreased urine volume.   Musculoskeletal: Negative for back pain.   Skin: Positive for wound.   Allergic/Immunologic: Positive for immunocompromised  state.   Neurological: Positive for weakness. Negative for dizziness, tremors and headaches.   Hematological: Negative.    Psychiatric/Behavioral: Negative for agitation, dysphoric mood and sleep disturbance. The patient is not nervous/anxious.      Objective:     Vital Signs (Most Recent):  Temp: 98.4 °F (36.9 °C) (08/02/18 1150)  Pulse: 105 (08/02/18 1527)  Resp: 16 (08/02/18 0745)  BP: (!) 124/59 (08/02/18 1150)  SpO2: 97 % (08/02/18 1150) Vital Signs (24h Range):  Temp:  [97.9 °F (36.6 °C)-98.5 °F (36.9 °C)] 98.4 °F (36.9 °C)  Pulse:  [] 105  Resp:  [16-18] 16  SpO2:  [96 %-97 %] 97 %  BP: (101-141)/(53-66) 124/59     Weight: 126 kg (277 lb 12.5 oz)  Body mass index is 35.66 kg/m².    Intake/Output - Last 3 Shifts       07/31 0700 - 08/01 0659 08/01 0700 - 08/02 0659 08/02 0700 - 08/03 0659    P.O. 720 1200     I.V. (mL/kg) 100 (0.8)      Blood  300     IV Piggyback 50 50     Total Intake(mL/kg) 870 (6.7) 1550 (12.3)     Urine (mL/kg/hr)  0 (0)     Emesis/NG output  0 (0)     Other  1600 (0.5)     Stool  0 (0)     Blood  0 (0)     Total Output   1600      Net +870 -50             Urine Occurrence 8 x 2 x     Stool Occurrence 3 x 2 x     Emesis Occurrence  0 x           Physical Exam   Constitutional: He is oriented to person, place, and time. He appears well-developed. No distress.   Temporal and distal extremity muscle wasting   HENT:   Head: Normocephalic and atraumatic.   Eyes: EOM are normal. Pupils are equal, round, and reactive to light. No scleral icterus.   Cardiovascular: Normal rate, regular rhythm and normal heart sounds.    Pulmonary/Chest: Effort normal.   Coarse BS   Abdominal: Soft. Bowel sounds are normal. He exhibits distension (ascites). There is no tenderness.   - [employer] cdi no ssi   Musculoskeletal: He exhibits edema (3+ BLE).   Neurological: He is alert and oriented to person, place, and time.   Skin: Skin is warm and dry. He is not diaphoretic.   Psychiatric: He has a normal mood  and affect. His behavior is normal. Judgment and thought content normal.   Nursing note and vitals reviewed.      Laboratory:  Immunosuppressants         Stop Route Frequency     tacrolimus capsule 5 mg      -- Oral 2 times daily     mycophenolate capsule 1,000 mg      -- Oral 2 times daily        CBC:     Recent Labs  Lab 08/02/18  0430   WBC 3.82*   RBC 2.28*   HGB 7.2*   HCT 21.9*   PLT 40*   MCV 96   MCH 31.6*   MCHC 32.9     CMP:     Recent Labs  Lab 08/02/18  0430   *   CALCIUM 7.3*   ALBUMIN 2.5*   PROT 4.0*   *   K 4.2   CO2 22*      BUN 33*   CREATININE 2.8*   ALKPHOS 76   ALT 34   AST 16   BILITOT 0.7     Labs within the past 24 hours have been reviewed.    Diagnostic Results:  I have personally reviewed all pertinent imaging studies.  None

## 2018-08-02 NOTE — PT/OT/SLP PROGRESS
Occupational Therapy      Patient Name:  Alon Adamson   MRN:  [id number]    Patient not seen today this date for occupational therapy services. During attempt in PM, central line recently pulled with HOB flat for 20 minutes. Unable to re-attempt later in PM. Will follow-up as scheduled.    Aida buckley OT  8/2/2018

## 2018-08-03 PROBLEM — Z01.810 PREOPERATIVE CARDIOVASCULAR EXAMINATION: Status: RESOLVED | Noted: 2018-04-26 | Resolved: 2018-08-03

## 2018-08-03 LAB
ALBUMIN SERPL BCP-MCNC: 2.5 G/DL
ALP SERPL-CCNC: 69 U/L
ALT SERPL W/O P-5'-P-CCNC: 31 U/L
ANION GAP SERPL CALC-SCNC: 6 MMOL/L
AST SERPL-CCNC: 14 U/L
BASOPHILS # BLD AUTO: 0.01 K/UL
BASOPHILS # BLD AUTO: 0.01 K/UL
BASOPHILS # BLD AUTO: 0.02 K/UL
BASOPHILS NFR BLD: 0.2 %
BASOPHILS NFR BLD: 0.3 %
BASOPHILS NFR BLD: 0.3 %
BILIRUB SERPL-MCNC: 0.5 MG/DL
BLD PROD TYP BPU: NORMAL
BLOOD UNIT EXPIRATION DATE: NORMAL
BLOOD UNIT TYPE CODE: 6200
BLOOD UNIT TYPE: NORMAL
BUN SERPL-MCNC: 38 MG/DL
CALCIUM SERPL-MCNC: 7.6 MG/DL
CHLORIDE SERPL-SCNC: 106 MMOL/L
CO2 SERPL-SCNC: 21 MMOL/L
CODING SYSTEM: NORMAL
CREAT SERPL-MCNC: 3.4 MG/DL
DIFFERENTIAL METHOD: ABNORMAL
DISPENSE STATUS: NORMAL
EOSINOPHIL # BLD AUTO: 0.1 K/UL
EOSINOPHIL NFR BLD: 1.7 %
EOSINOPHIL NFR BLD: 1.7 %
EOSINOPHIL NFR BLD: 2.1 %
ERYTHROCYTE [DISTWIDTH] IN BLOOD BY AUTOMATED COUNT: 16.5 %
ERYTHROCYTE [DISTWIDTH] IN BLOOD BY AUTOMATED COUNT: 17.1 %
ERYTHROCYTE [DISTWIDTH] IN BLOOD BY AUTOMATED COUNT: 17.2 %
EST. GFR  (AFRICAN AMERICAN): 21.1 ML/MIN/1.73 M^2
EST. GFR  (NON AFRICAN AMERICAN): 18.3 ML/MIN/1.73 M^2
FERRITIN SERPL-MCNC: 212 NG/ML
GLUCOSE SERPL-MCNC: 161 MG/DL
HCT VFR BLD AUTO: 19.7 %
HCT VFR BLD AUTO: 20.9 %
HCT VFR BLD AUTO: 23.5 %
HGB BLD-MCNC: 6.4 G/DL
HGB BLD-MCNC: 6.7 G/DL
HGB BLD-MCNC: 7.9 G/DL
IMM GRANULOCYTES # BLD AUTO: 0.04 K/UL
IMM GRANULOCYTES # BLD AUTO: 0.06 K/UL
IMM GRANULOCYTES # BLD AUTO: 0.07 K/UL
IMM GRANULOCYTES NFR BLD AUTO: 0.9 %
IMM GRANULOCYTES NFR BLD AUTO: 1.2 %
IMM GRANULOCYTES NFR BLD AUTO: 1.7 %
IRON SERPL-MCNC: 50 UG/DL
LYMPHOCYTES # BLD AUTO: 0.5 K/UL
LYMPHOCYTES # BLD AUTO: 0.5 K/UL
LYMPHOCYTES # BLD AUTO: 0.6 K/UL
LYMPHOCYTES NFR BLD: 12.7 %
LYMPHOCYTES NFR BLD: 13.7 %
LYMPHOCYTES NFR BLD: 8.6 %
MAGNESIUM SERPL-MCNC: 1.8 MG/DL
MCH RBC QN AUTO: 31.2 PG
MCH RBC QN AUTO: 31.5 PG
MCH RBC QN AUTO: 32.5 PG
MCHC RBC AUTO-ENTMCNC: 32.1 G/DL
MCHC RBC AUTO-ENTMCNC: 32.5 G/DL
MCHC RBC AUTO-ENTMCNC: 33.6 G/DL
MCV RBC AUTO: 97 FL
MONOCYTES # BLD AUTO: 0.4 K/UL
MONOCYTES # BLD AUTO: 0.5 K/UL
MONOCYTES # BLD AUTO: 0.6 K/UL
MONOCYTES NFR BLD: 11.6 %
MONOCYTES NFR BLD: 11.9 %
MONOCYTES NFR BLD: 9.7 %
NEUTROPHILS # BLD AUTO: 2.5 K/UL
NEUTROPHILS # BLD AUTO: 3 K/UL
NEUTROPHILS # BLD AUTO: 4.7 K/UL
NEUTROPHILS NFR BLD: 71.5 %
NEUTROPHILS NFR BLD: 71.7 %
NEUTROPHILS NFR BLD: 78.5 %
NRBC BLD-RTO: 0 /100 WBC
PHOSPHATE SERPL-MCNC: 3.4 MG/DL
PLATELET # BLD AUTO: 53 K/UL
PLATELET # BLD AUTO: 54 K/UL
PLATELET # BLD AUTO: 62 K/UL
PMV BLD AUTO: 11.2 FL
PMV BLD AUTO: 11.3 FL
PMV BLD AUTO: 11.7 FL
POCT GLUCOSE: 131 MG/DL (ref 70–110)
POCT GLUCOSE: 133 MG/DL (ref 70–110)
POCT GLUCOSE: 96 MG/DL (ref 70–110)
POTASSIUM SERPL-SCNC: 4.2 MMOL/L
PROT SERPL-MCNC: 3.9 G/DL
RBC # BLD AUTO: 2.03 M/UL
RBC # BLD AUTO: 2.15 M/UL
RBC # BLD AUTO: 2.43 M/UL
SATURATED IRON: 27 %
SODIUM SERPL-SCNC: 133 MMOL/L
TACROLIMUS BLD-MCNC: 6 NG/ML
TOTAL IRON BINDING CAPACITY: 182 UG/DL
TRANS ERYTHROCYTES VOL PATIENT: NORMAL ML
TRANSFERRIN SERPL-MCNC: 123 MG/DL
WBC # BLD AUTO: 3.53 K/UL
WBC # BLD AUTO: 4.24 K/UL
WBC # BLD AUTO: 5.96 K/UL

## 2018-08-03 PROCEDURE — 80197 ASSAY OF TACROLIMUS: CPT

## 2018-08-03 PROCEDURE — 90935 HEMODIALYSIS ONE EVALUATION: CPT

## 2018-08-03 PROCEDURE — 94664 DEMO&/EVAL PT USE INHALER: CPT

## 2018-08-03 PROCEDURE — 25000003 PHARM REV CODE 250: Performed by: STUDENT IN AN ORGANIZED HEALTH CARE EDUCATION/TRAINING PROGRAM

## 2018-08-03 PROCEDURE — 63600175 PHARM REV CODE 636 W HCPCS: Performed by: STUDENT IN AN ORGANIZED HEALTH CARE EDUCATION/TRAINING PROGRAM

## 2018-08-03 PROCEDURE — 63600175 PHARM REV CODE 636 W HCPCS: Performed by: NURSE PRACTITIONER

## 2018-08-03 PROCEDURE — 36430 TRANSFUSION BLD/BLD COMPNT: CPT

## 2018-08-03 PROCEDURE — 25000003 PHARM REV CODE 250: Performed by: INTERNAL MEDICINE

## 2018-08-03 PROCEDURE — 94761 N-INVAS EAR/PLS OXIMETRY MLT: CPT

## 2018-08-03 PROCEDURE — P9021 RED BLOOD CELLS UNIT: HCPCS

## 2018-08-03 PROCEDURE — 99233 SBSQ HOSP IP/OBS HIGH 50: CPT | Mod: 24,,, | Performed by: PHYSICIAN ASSISTANT

## 2018-08-03 PROCEDURE — 85025 COMPLETE CBC W/AUTO DIFF WBC: CPT

## 2018-08-03 PROCEDURE — 83540 ASSAY OF IRON: CPT

## 2018-08-03 PROCEDURE — 63600175 PHARM REV CODE 636 W HCPCS: Performed by: SURGERY

## 2018-08-03 PROCEDURE — 27000646 HC AEROBIKA DEVICE

## 2018-08-03 PROCEDURE — 99231 SBSQ HOSP IP/OBS SF/LOW 25: CPT | Mod: GC,,, | Performed by: INTERNAL MEDICINE

## 2018-08-03 PROCEDURE — 63600175 PHARM REV CODE 636 W HCPCS: Mod: JG | Performed by: PHYSICIAN ASSISTANT

## 2018-08-03 PROCEDURE — 25000003 PHARM REV CODE 250: Performed by: SURGERY

## 2018-08-03 PROCEDURE — 83735 ASSAY OF MAGNESIUM: CPT

## 2018-08-03 PROCEDURE — 20600001 HC STEP DOWN PRIVATE ROOM

## 2018-08-03 PROCEDURE — 99231 SBSQ HOSP IP/OBS SF/LOW 25: CPT | Mod: ,,, | Performed by: NURSE PRACTITIONER

## 2018-08-03 PROCEDURE — 25000003 PHARM REV CODE 250: Performed by: PHYSICIAN ASSISTANT

## 2018-08-03 PROCEDURE — 84100 ASSAY OF PHOSPHORUS: CPT

## 2018-08-03 PROCEDURE — 63600175 PHARM REV CODE 636 W HCPCS: Performed by: INTERNAL MEDICINE

## 2018-08-03 PROCEDURE — 63600175 PHARM REV CODE 636 W HCPCS: Performed by: PEDIATRICS

## 2018-08-03 PROCEDURE — 80053 COMPREHEN METABOLIC PANEL: CPT

## 2018-08-03 PROCEDURE — 82728 ASSAY OF FERRITIN: CPT

## 2018-08-03 RX ORDER — RAMELTEON 8 MG/1
8 TABLET ORAL ONCE
Status: COMPLETED | OUTPATIENT
Start: 2018-08-03 | End: 2018-08-03

## 2018-08-03 RX ORDER — HYDROCODONE BITARTRATE AND ACETAMINOPHEN 500; 5 MG/1; MG/1
TABLET ORAL
Status: DISCONTINUED | OUTPATIENT
Start: 2018-08-03 | End: 2018-08-06

## 2018-08-03 RX ORDER — SODIUM CHLORIDE 9 MG/ML
INJECTION, SOLUTION INTRAVENOUS
Status: DISCONTINUED | OUTPATIENT
Start: 2018-08-03 | End: 2018-08-11

## 2018-08-03 RX ORDER — SODIUM CHLORIDE 9 MG/ML
INJECTION, SOLUTION INTRAVENOUS ONCE
Status: COMPLETED | OUTPATIENT
Start: 2018-08-03 | End: 2018-08-03

## 2018-08-03 RX ADMIN — INSULIN DETEMIR 4 UNITS: 100 INJECTION, SOLUTION SUBCUTANEOUS at 08:08

## 2018-08-03 RX ADMIN — FUROSEMIDE 100 MG: 10 INJECTION, SOLUTION INTRAVENOUS at 08:08

## 2018-08-03 RX ADMIN — ACYCLOVIR 200 MG: 200 CAPSULE ORAL at 08:08

## 2018-08-03 RX ADMIN — SODIUM CHLORIDE: 0.9 INJECTION, SOLUTION INTRAVENOUS at 06:08

## 2018-08-03 RX ADMIN — MYCOPHENOLATE MOFETIL 1000 MG: 250 CAPSULE ORAL at 06:08

## 2018-08-03 RX ADMIN — PREDNISONE 20 MG: 20 TABLET ORAL at 06:08

## 2018-08-03 RX ADMIN — MICONAZOLE NITRATE: 20 OINTMENT TOPICAL at 09:08

## 2018-08-03 RX ADMIN — SULFAMETHOXAZOLE AND TRIMETHOPRIM 1 TABLET: 400; 80 TABLET ORAL at 07:08

## 2018-08-03 RX ADMIN — TRIAMCINOLONE ACETONIDE: 1 CREAM TOPICAL at 08:08

## 2018-08-03 RX ADMIN — CEFTRIAXONE SODIUM 2 G: 2 INJECTION, POWDER, FOR SOLUTION INTRAMUSCULAR; INTRAVENOUS at 07:08

## 2018-08-03 RX ADMIN — TACROLIMUS 5 MG: 5 CAPSULE ORAL at 07:08

## 2018-08-03 RX ADMIN — RAMELTEON 8 MG: 8 TABLET, FILM COATED ORAL at 10:08

## 2018-08-03 RX ADMIN — TACROLIMUS 5 MG: 5 CAPSULE ORAL at 06:08

## 2018-08-03 RX ADMIN — ERYTHROPOIETIN 6300 UNITS: 10000 INJECTION, SOLUTION INTRAVENOUS; SUBCUTANEOUS at 06:08

## 2018-08-03 RX ADMIN — INSULIN ASPART 5 UNITS: 100 INJECTION, SOLUTION INTRAVENOUS; SUBCUTANEOUS at 08:08

## 2018-08-03 RX ADMIN — PANTOPRAZOLE SODIUM 40 MG: 40 TABLET, DELAYED RELEASE ORAL at 08:08

## 2018-08-03 RX ADMIN — MYCOPHENOLATE MOFETIL 1000 MG: 250 CAPSULE ORAL at 08:08

## 2018-08-03 RX ADMIN — INSULIN ASPART 5 UNITS: 100 INJECTION, SOLUTION INTRAVENOUS; SUBCUTANEOUS at 01:08

## 2018-08-03 RX ADMIN — ATORVASTATIN CALCIUM 40 MG: 20 TABLET, FILM COATED ORAL at 08:08

## 2018-08-03 RX ADMIN — DOCUSATE SODIUM 100 MG: 100 CAPSULE, LIQUID FILLED ORAL at 08:08

## 2018-08-03 RX ADMIN — FLUCONAZOLE 200 MG: 200 TABLET ORAL at 08:08

## 2018-08-03 RX ADMIN — LEVETIRACETAM 500 MG: 500 TABLET ORAL at 08:08

## 2018-08-03 RX ADMIN — HEPARIN SODIUM 5000 UNITS: 5000 INJECTION, SOLUTION INTRAVENOUS; SUBCUTANEOUS at 08:08

## 2018-08-03 RX ADMIN — HEPARIN SODIUM 5000 UNITS: 5000 INJECTION, SOLUTION INTRAVENOUS; SUBCUTANEOUS at 04:08

## 2018-08-03 NOTE — ASSESSMENT & PLAN NOTE
Started on RRT, today with increasing sCr, although acid/base and lytes are fine, he is still significantly volume overloaded    - continues to make urine and output may be increasing, although not adequately recorded (goes on the pad in the bed), however not providing much clearance and likely not making enough from a volume standpoint, however not providing much clearance     - RRT in the [facility] this afternoon, mostly for clearance, will try to remove no more than 1 liter  - strict Is & Os and serial RFPs  - Avoid nephrotoxic medications  - Hb > 7gm/dL

## 2018-08-03 NOTE — PROGRESS NOTES
Dr. Thompson notified of pt's critical value- H&H:6.4 & 19.7.  1 unit of PRBC and stat liver US ordered.  Will continue to monitor pt.

## 2018-08-03 NOTE — PROGRESS NOTES
HD completed @1830, blood returned Right IJ saline locked and clamps applied. 1 hr left into tx pt began to clot on arterial side, all blood was returned and pt was restarted w/o difficulty. EPO was administered prior to ending HD thru the machine. Pt is asymtomatic bradycardiac in 47-50's. Denies pain or discomfort, Report called to nurse pt off unit to floor in own bed

## 2018-08-03 NOTE — SUBJECTIVE & OBJECTIVE
"Interval HPI:   Overnight events: remains in TSU. BG at goal overnight with scheduled insulin; not requiring correction scale. PRBCs and liver ultrasound today. Prednisone 20 mg. HD.   Eatin%  Nausea: No  Hypoglycemia and intervention: No  Fever: No  TPN and/or TF: No    BP (!) 111/56   Pulse (!) 48   Temp 98.6 °F (37 °C) (Oral)   Resp 18   Ht 6' 2" (1.88 m)   Wt 126.6 kg (279 lb 1.6 oz)   SpO2 95%   BMI 35.83 kg/m²     Labs Reviewed and Include      Recent Labs  Lab 18  0430   *   CALCIUM 7.6*   ALBUMIN 2.5*   PROT 3.9*   *   K 4.2   CO2 21*      BUN 38*   CREATININE 3.4*   ALKPHOS 69   ALT 31   AST 14   BILITOT 0.5     Lab Results   Component Value Date    WBC 4.24 2018    HGB 6.7 (L) 2018    HCT 20.9 (L) 2018    MCV 97 2018    PLT 53 (L) 2018     No results for input(s): TSH, FREET4 in the last 168 hours.  Lab Results   Component Value Date    HGBA1C 5.2 2018       Nutritional status:   Body mass index is 35.83 kg/m².  Lab Results   Component Value Date    ALBUMIN 2.5 (L) 2018    ALBUMIN 2.5 (L) 2018    ALBUMIN 2.5 (L) 2018     Lab Results   Component Value Date    PREALBUMIN 4 (L) 2018    PREALBUMIN 5 (L) 2018    PREALBUMIN 7 (L) 2016       Estimated Creatinine Clearance: 31.9 mL/min (A) (based on SCr of 3.4 mg/dL (H)).    Accu-Checks  Recent Labs      18   1705  18   2043  18   1221  18   1649  18   2030  18   0742  18   1149  18   1712  18   2045  18   0847   POCTGLUCOSE  161*  185*  144*  139*  189*  148*  164*  186*  169*  131*       Current Medications and/or Treatments Impacting Glycemic Control  Immunotherapy:  Immunosuppressants         Stop Route Frequency     tacrolimus capsule 5 mg      -- Oral 2 times daily     mycophenolate capsule 1,000 mg      -- Oral 2 times daily        Steroids:   Hormones     Start     Stop Route Frequency " Ordered    07/29/18 0900  predniSONE tablet 20 mg  (methylprednisolone taper panel)      -- Oral Daily 07/23/18 1326        Pressors:    Autonomic Drugs     None        Hyperglycemia/Diabetes Medications: Antihyperglycemics     Start     Stop Route Frequency Ordered    08/03/18 0900  insulin detemir U-100 pen 4 Units      -- SubQ 2 times daily 08/02/18 1442    07/31/18 1130  insulin aspart U-100 pen 5 Units      -- SubQ 3 times daily with meals 07/31/18 0956    07/27/18 1805  insulin aspart U-100 pen 0-5 Units      -- SubQ Before meals & nightly PRN 07/27/18 1704

## 2018-08-03 NOTE — PROGRESS NOTES
Wound care follow up.   Coflex compression kit to the leg dressing change.   Legs appear significantly improved with much less edema present (+2 today from +4 weds). Legs cleansed and calamine unna coflex applied.   Patient tolerated well.   Patient is having frequent loose stools. He is very edematous thru the scrotum area. There is excoriation thru the groin  Recommend:  Barrier antifungal cream BID to the perianal, scrotum and inner thighs                 08/03/18 1233       Wound 06/09/18 0100 Other (comment) lower Leg   Date First Assessed/Time First Assessed: 06/09/18 0100   Pre-existing: Yes  Primary Wound Type: (c) Other (comment)  Side: (c)   Orientation: lower  Location: Leg   Wound Image    Wound WDL ex   Dressing Appearance Dry;Intact   Drainage Amount Scant   Drainage Characteristics/Odor Serosanguineous   Appearance Pink   Tissue loss description Partial thickness   Red (%), Wound Tissue Color 100 %   Periwound Area Hemosiderin Staining   Wound Length (cm) 0.3 cm   Wound Width (cm) 0.2 cm   Wound Depth (cm) 0.1 cm   Wound Volume (cm^3) 0.01 cm^3   Care Cleansed with:;Soap and water   Dressing Removed;Applied;Changed;Compression wrap   Dressing Change Due 08/07/18

## 2018-08-03 NOTE — PT/OT/SLP PROGRESS
Occupational Therapy      Patient Name:  Alon Adamson   MRN:  [id number]    Attempted to see patient at 2:45PM. Patient not seen today secondary to off floor dialysis. Will follow-up as scheduled.     Aida buckley OT  8/3/2018

## 2018-08-03 NOTE — PT/OT/SLP PROGRESS
Physical Therapy  Pt Not Seen    Patient Name:  Alon Adamson   MRN:  [id number]    Patient not seen today secondary to pt Unavailable (Comment), Dialysis (2 attempts in pm). Will follow-up on next scheduled visit.    Rin Wilcox, PTA  8/3/2018

## 2018-08-03 NOTE — PROGRESS NOTES
[facility]  Nephrology  Progress Note    Patient Name: Alon Adamson  MRN: [id number]  Admission Date: 6/22/2018  Hospital Length of Stay: 42 days  Attending Provider: Jair Sheridan MD   Primary Care Physician: Mckinley Vides MD  Principal Problem:Liver transplanted    Subjective:     HPI: Mr. Adamson is 63 yr old male with decompensated alcoholic cirrhosis, CKD III and CAD admitted here on 06/22/18 admitted for bilateral LE hypervolemia and VICKY on CKD III with cr of 2.3 baseline around 1.5. Patient has multiple hospitalization in the past secondary to decompensated liver failure. Patient is currently on transplant team. During current admission patient was getting lasix and albumin intermittently for VICKY however renal function was not getting better. Patient hospital course complicated by hepatic encephalopathy with some improvement of mentation with lactulose. Also treated for cellulitis with 7 days of vancomycin.Nephrology is consulted for worsening/not improving VICKY despite lasix/albumin.     Per chart review patient has no hypotensive episodes, BP mostly above 100's. Has not received nephrotoxins such as NSAIDs/contrast media. No sever sepsis/septic shock or acute blood loss during current admit. UA with 2+ leukocytes and Hyaline casts, no wbc/rbc cast or proteinuria. Urine Na+ < 20.    Patient was transferred to ICU on 7/13/2018 after being more lethargic and hypoactive.  After two days was stepped down back to [facility].       Interval History: patient feeling fine this morning, there are 15 unmeasured urinations, but volumes uncertain sCr 2.8 --> 3.4    Review of patient's allergies indicates:   Allergen Reactions    Nsaids (non-steroidal anti-inflammatory drug)      D/t to liver disease.    Tylenol [acetaminophen]      D/t liver disease.    Penicillins Other (See Comments)     Tolerated pip-tazo in 8/2016 without issue  Tolerated cefepime 7/2018 without issue  Since  childhood was told not to take it     Current Facility-Administered Medications   Medication Frequency    0.9%  NaCl infusion (for blood administration) Q24H PRN    0.9%  NaCl infusion (for blood administration) Q24H PRN    0.9%  NaCl infusion (for blood administration) Q24H PRN    0.9%  NaCl infusion PRN    0.9%  NaCl infusion Once    acyclovir capsule 200 mg BID    atorvastatin tablet 40 mg Daily    cefTRIAXone (ROCEPHIN) 2 g in dextrose 5 % 50 mL IVPB Q24H    dextrose 50% injection 12.5 g PRN    dextrose 50% injection 25 g PRN    docusate sodium capsule 100 mg Daily    ergocalciferol capsule 50,000 Units Q7 Days    fluconazole tablet 200 mg Daily    furosemide injection 100 mg BID    gelatin adsorbable 100cm top sponge 100 sponge 1 applicator PRN    glucagon (human recombinant) injection 1 mg PRN    glucose chewable tablet 16 g PRN    glucose chewable tablet 24 g PRN    heparin (porcine) injection 5,000 Units Q8H    insulin aspart U-100 pen 0-5 Units QID (AC + HS) PRN    insulin aspart U-100 pen 5 Units TIDWM    insulin detemir U-100 pen 4 Units BID    levETIRAcetam tablet 500 mg BID    mycophenolate capsule 1,000 mg BID    ondansetron injection 4 mg Q8H PRN    oxyCODONE immediate release tablet 10 mg Q6H PRN    oxyCODONE immediate release tablet 5 mg Q6H PRN    pantoprazole EC tablet 40 mg BID    predniSONE tablet 20 mg Daily    sodium chloride 0.9% flush 3 mL PRN    sulfamethoxazole-trimethoprim 400-80mg per tablet 1 tablet Every Mon, Wed, Fri    tacrolimus capsule 5 mg BID    triamcinolone acetonide 0.1% cream BID       Objective:     Vital Signs (Most Recent):  Temp: 98 °F (36.7 °C) (08/03/18 1158)  Pulse: (!) 48 (08/03/18 1200)  Resp: 17 (08/03/18 1158)  BP: (!) 125/58 (08/03/18 1158)  SpO2: 96 % (08/03/18 1158)  O2 Device (Oxygen Therapy): room air (08/03/18 1100) Vital Signs (24h Range):  Temp:  [97.8 °F (36.6 °C)-98.8 °F (37.1 °C)] 98 °F (36.7 °C)  Pulse:  []  48  Resp:  [16-18] 17  SpO2:  [95 %-99 %] 96 %  BP: (110-134)/(56-61) 125/58     Weight: 126.6 kg (279 lb 1.6 oz) (08/03/18 0500)  Body mass index is 35.83 kg/m².  Body surface area is 2.57 meters squared.    I/O last 3 completed shifts:  In: 950 [P.O.:950]  Out: -     Physical Exam   Constitutional: He is oriented to person, place, and time.   HENT:   Head: Atraumatic.   Neck: No JVD present.   Cardiovascular: Normal rate and regular rhythm.  Exam reveals no friction rub.    Pulmonary/Chest: Effort normal and breath sounds normal.   Abdominal: Soft. He exhibits distension.   Musculoskeletal: He exhibits edema.   Neurological: He is alert and oriented to person, place, and time.   Skin: Skin is warm.   Psychiatric: He has a normal mood and affect.       Assessment/Plan:     CKD (chronic kidney disease), stage III    No with acute component and requiring dialysis (see VICKY section)        Acute kidney injury superimposed on chronic kidney disease    Started on RRT, today with increasing sCr, although acid/base and lytes are fine, he is still significantly volume overloaded    - continues to make urine and output may be increasing, although not adequately recorded (goes on the pad in the bed), however not providing much clearance and likely not making enough from a volume standpoint, however not providing much clearance     - RRT in the ALEAH this afternoon, mostly for clearance, will try to remove no more than 1 liter  - strict Is & Os and serial RFPs  - Avoid nephrotoxic medications  - Hb > 7gm/dL            Thank you for your consult. I will follow-up with patient. Please contact us if you have any additional questions.    Elan Magana MD  Nephrology  [facility]    ATTENDING PHYSICIAN ATTESTATION  I have personally interviewed and examined the patient. I thoroughly reviewed the demographic, clinical, laboratorial and imaging information available in medical records. I agree with the  assessment and recommendations provided by the subspecialty resident. Dr. Magana was under my supervision.

## 2018-08-03 NOTE — PROGRESS NOTES
"[facility]  Endocrinology  Progress Note    Admit Date: 2018     Reason for Consult: Management of hypothermia and followed by consult on steroid induced hyperglycemia     Diabetes diagnosis year: none      HPI:   Patient is a 63 y.o. 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 Hospital course was complicated by hepatic encephalopathy. During the course, he has also had few episodes of hypothermia with temp as low as 93 F. He completed 7 day course of vancomycin for cellilitis, and is currently on vancomycin and cefepime for possible sepsis. However he continues to be hypothermic.     Further work-up of hypothermia was done, which showed TSH: 2.714, FT4: 0.53, AM cortisol: 9.9.    Interval HPI:   Overnight events: remains in TSU. BG at goal overnight with scheduled insulin; not requiring correction scale. PRBCs and liver ultrasound today. Prednisone 20 mg. HD.   Eatin%  Nausea: No  Hypoglycemia and intervention: No  Fever: No  TPN and/or TF: No    BP (!) 111/56   Pulse (!) 48   Temp 98.6 °F (37 °C) (Oral)   Resp 18   Ht 6' 2" (1.88 m)   Wt 126.6 kg (279 lb 1.6 oz)   SpO2 95%   BMI 35.83 kg/m²       Labs Reviewed and Include      Recent Labs  Lab 18  0430   *   CALCIUM 7.6*   ALBUMIN 2.5*   PROT 3.9*   *   K 4.2   CO2 21*      BUN 38*   CREATININE 3.4*   ALKPHOS 69   ALT 31   AST 14   BILITOT 0.5     Lab Results   Component Value Date    WBC 4.24 2018    HGB 6.7 (L) 2018    HCT 20.9 (L) 2018    MCV 97 2018    PLT 53 (L) 2018     No results for input(s): TSH, FREET4 in the last 168 hours.  Lab Results   Component Value Date    " HGBA1C 5.2 07/22/2018       Nutritional status:   Body mass index is 35.83 kg/m².  Lab Results   Component Value Date    ALBUMIN 2.5 (L) 08/03/2018    ALBUMIN 2.5 (L) 08/02/2018    ALBUMIN 2.5 (L) 08/01/2018     Lab Results   Component Value Date    PREALBUMIN 4 (L) 06/30/2018    PREALBUMIN 5 (L) 01/22/2018    PREALBUMIN 7 (L) 08/31/2016       Estimated Creatinine Clearance: 31.9 mL/min (A) (based on SCr of 3.4 mg/dL (H)).    Accu-Checks  Recent Labs      07/31/18   1705  07/31/18   2043  08/01/18   1221  08/01/18   1649  08/01/18   2030  08/02/18   0742  08/02/18   1149  08/02/18   1712  08/02/18   2045  08/03/18   0847   POCTGLUCOSE  161*  185*  144*  139*  189*  148*  164*  186*  169*  131*       Current Medications and/or Treatments Impacting Glycemic Control  Immunotherapy:  Immunosuppressants         Stop Route Frequency     tacrolimus capsule 5 mg      -- Oral 2 times daily     mycophenolate capsule 1,000 mg      -- Oral 2 times daily        Steroids:   Hormones     Start     Stop Route Frequency Ordered    07/29/18 0900  predniSONE tablet 20 mg  (methylprednisolone taper panel)      -- Oral Daily 07/23/18 1326        Pressors:    Autonomic Drugs     None        Hyperglycemia/Diabetes Medications: Antihyperglycemics     Start     Stop Route Frequency Ordered    08/03/18 0900  insulin detemir U-100 pen 4 Units      -- SubQ 2 times daily 08/02/18 1442    07/31/18 1130  insulin aspart U-100 pen 5 Units      -- SubQ 3 times daily with meals 07/31/18 0956    07/27/18 1805  insulin aspart U-100 pen 0-5 Units      -- SubQ Before meals & nightly PRN 07/27/18 1705          ASSESSMENT and PLAN    * Liver transplanted      S/p transplanted.  Currently on scheduled steroid taper.  Per transplant  Avoid hypoglycemia.         Steroid-induced hyperglycemia    No previous history of DM  BG goal 140-180    Levemir 4 BID.  Continue novolog 5 units with meals.  BG monitoring ac/hs and low dose correction scale.      Discharge:  TBD.          Long-term use of immunosuppressant medication    May increase insulin resistance.             Stage II pressure ulcer of sacral region    Optimize BG.           CKD (chronic kidney disease), stage III    Avoid insulin stacking and hypoglycemia.  Lab Results   Component Value Date    CREATININE 3.4 (H) 08/03/2018               Acute kidney injury superimposed on chronic kidney disease      Avoid insulin stacking            Alma Snyder NP  Endocrinology  [facility]

## 2018-08-03 NOTE — PLAN OF CARE
Problem: Patient Care Overview  Goal: Plan of Care Review  Outcome: Ongoing (interventions implemented as appropriate)  -Pt AAOx4  -Vital signs stable  -ACHS BG monitoring, HS BG was 169, no coverage indicated  -Telemetry monitoring NSR/SB  -Weight shifted Q2 hours to prevent DI  -mom at bedside   -Bed lowered, locked, side rails up x2, and call bell in reach     See flowsheet for assessment findings.  Will continue to monitor pt.

## 2018-08-03 NOTE — PROGRESS NOTES
Pt rested comfortably this shift. Infrequent cough and coarse lung sounds noted, pt is receiving IV lasix 100 mg BID, unable to measure urine due to scrotal edema, UO=blue pad count x4 so far this shift. HD today. 1 unit of PRBC administered, H/H increased from 6-7/20.9 to 7.9/23.5. Attempted to xfer pt to chair, but pt is to deconditioned, staff continuing to search for working cardiac chair. AC/HS, no SSI coverage required. Pt's mother pulled self meds with 100% accuracy. Pt turns self in bed independently no new signs of skin breakdown noted. This RN instructed the pt on the importance of hand hygiene, pt verbalized understanding. Fall precautions in place, side rails upx3, non slip footwear applied, pt instructed to call for assistance before attempting to ambulate, pt verbalized understanding. Call bell within reach, family present at bedside.

## 2018-08-03 NOTE — ASSESSMENT & PLAN NOTE
- Continue intermittent dialysis.   - Tolerated HD well 8/1, again 8/3  - Wrapped by wound care with significant improvement

## 2018-08-03 NOTE — ASSESSMENT & PLAN NOTE
- H&H 6.6/20.3 8/1: Transfused 1 unit PRBCs  - H/H 6.4/19.7: Transfused 1 u PRBC  - Epo initiated today

## 2018-08-03 NOTE — PROGRESS NOTES
Met with patient, wife, mother and son for discharge teaching.  Reviewed My New Journey: Living Smart After My Liver Transplant.  Sections reviewed were: First Steps, Prevention and Appointments.  Medication sections were reviewed by Pharm D.  Allowed time for questions and answers. Asked patient and family to complete the review questions in the back of the discharge book.

## 2018-08-03 NOTE — PROGRESS NOTES
Arterial pressure increased, arterial and venous flushed w/ 10cc NS, lines reversed and BFR decreased to 300

## 2018-08-03 NOTE — PROGRESS NOTES
[facility]  Liver Transplant  Progress Note    Patient Name: Alon Adamson  MRN: [id number]  Admission Date: 2018  Hospital Length of Stay: 42 days  Code Status: Full Code  Primary Care Provider: Mckinley Vides MD  Post-Operative Day: 11    ORGAN:   LIVER  Disease Etiology: Alcoholic Cirrhosis  Donor Type:    - Brain Death  CDC High Risk:   No  Donor CMV Status:   Donor CMV Status: Negative  Donor HBcAB:   Negative  Donor HCV Status:   Negative  Donor HBV TAMAR: Negative  Donor HCV TAMAR: Negative  Whole or Partial: Whole Liver  Biliary Anastomosis: End to End  Arterial Anatomy: Standard  Subjective:     History of Present Illness:  Mr. 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  Complications of liver disease include hyperbilirubinemia, hypoalbuminemia, severe malnutrition,   hepatic encephalopathy, thrombocytopenia, splenomegaly, ascites, hypervolemia, coagulopathy, portal HTN.  Pt admitted  for acutely worsening hepatic encephalopathy and concern for BLE cellulitis as well as severe volume overload.  Complications of ELSD managed while inpt and he received liver offer on 18.  Pt underwent liver transplant without complication: steroid induction, DBD, CMV -/-.  Donor with E coli bacteremia resistant to cipro placed on Rocephin x 2 weeks per ID recs.  Also on fluconazole x 30 days as pt very ill prior to transplant. Post transplant, with nice trend down of AST/ALT and LFTs.  Liver U/S with elevated RIs and decreased HAV.  During initial post operative period, pt required pressor support.  Pt with expected post operative VICKY as with VICKY prior to surgery.  Nephrology consulted and pt placed on intermittent CRRT.  Pressor requirement decreased and pt tolerated CRRT without pressors.  Pt with asymptomatic bradycardia while in ICU prompting cardiology consult. Pt without need for acute intervention but atropine placed at bedside.  HR has improved POD  7 and 8.  He was transferred to the TSU 7/30 PM.          Hospital Course:  Interval history: Pt feeling ok today. Tolerated HD Wednesday and again today.  Liver US 7/31 showed small fluid collection likely an adrenal hemorrhage vs hematoma. H&H decreased to 6.6/20.3 8/1- responded to 1 u PRBCs. H/H stable 8/2 AM. Dropped again 8/3 AM- 1 u PRBC given.  Iron studies checked and pt started on epo.  No active bleed.  Liver U/S repeated and negative for sign of bleed, results satisfactory.  Pt continues to have difficulty with urinating due to scrotal edema. 2 unmeasured uop recorded- asked nurses to be very accurate with #UM urine and to go based on blue pad count. Pt states he feels like he is urinating more. D/C lasix 100 mg BID today as HD today. Continue PT/OT and encourage supplements for malnutrition.     Scheduled Meds:   sodium chloride 0.9%   Intravenous Once    acyclovir  200 mg Oral BID    atorvastatin  40 mg Oral Daily    cefTRIAXone (ROCEPHIN) IVPB  2 g Intravenous Q24H    docusate sodium  100 mg Oral Daily    epoetin dread (PROCRIT) injection  50 Units/kg Intravenous Once    ergocalciferol  50,000 Units Oral Q7 Days    fluconazole  200 mg Oral Daily    furosemide  100 mg Intravenous BID    heparin (porcine)  5,000 Units Subcutaneous Q8H    insulin aspart U-100  5 Units Subcutaneous TIDWM    insulin detemir U-100  4 Units Subcutaneous BID    levETIRAcetam  500 mg Oral BID    miconazole nitrate 2%   Topical (Top) BID    mycophenolate  1,000 mg Oral BID    pantoprazole  40 mg Oral BID    predniSONE  20 mg Oral Daily    sulfamethoxazole-trimethoprim 400-80mg  1 tablet Oral Every Mon, Wed, Fri    tacrolimus  5 mg Oral BID    triamcinolone acetonide 0.1%   Topical (Top) BID     Continuous Infusions:  PRN Meds:sodium chloride, sodium chloride, sodium chloride, sodium chloride 0.9%, dextrose 50%, dextrose 50%, gelatin adsorbable 100cm top sponge, glucagon (human recombinant), glucose, glucose,  insulin aspart U-100, ondansetron, oxyCODONE, oxyCODONE, sodium chloride 0.9%    Review of Systems   Constitutional: Positive for activity change, appetite change and fatigue. Negative for chills, diaphoresis and fever.   HENT: Negative for congestion and facial swelling.    Eyes: Negative for pain, discharge and visual disturbance.   Respiratory: Positive for cough. Negative for chest tightness, shortness of breath and wheezing.    Cardiovascular: Positive for leg swelling. Negative for chest pain and palpitations.   Gastrointestinal: Positive for abdominal distention. Negative for abdominal pain, constipation, diarrhea, nausea and vomiting.   Endocrine: Negative.    Genitourinary: Positive for decreased urine volume.   Musculoskeletal: Negative for back pain.   Skin: Positive for wound.   Allergic/Immunologic: Positive for immunocompromised state.   Neurological: Positive for weakness. Negative for dizziness, tremors and headaches.   Hematological: Negative.    Psychiatric/Behavioral: Negative for agitation, dysphoric mood and sleep disturbance. The patient is not nervous/anxious.      Objective:     Vital Signs (Most Recent):  Temp: 97.6 °F (36.4 °C) (08/03/18 1500)  Pulse: (!) 58 (08/03/18 1645)  Resp: (!) 21 (08/03/18 1500)  BP: 106/69 (08/03/18 1645)  SpO2: 99 % (08/03/18 1500) Vital Signs (24h Range):  Temp:  [97.6 °F (36.4 °C)-98.8 °F (37.1 °C)] 97.6 °F (36.4 °C)  Pulse:  [41-70] 58  Resp:  [16-21] 21  SpO2:  [95 %-99 %] 99 %  BP: (106-133)/(44-69) 106/69     Weight: 126.6 kg (279 lb 1.6 oz)  Body mass index is 35.83 kg/m².    Intake/Output - Last 3 Shifts       08/01 0700 - 08/02 0659 08/02 0700 - 08/03 0659 08/03 0700 - 08/04 0659    P.O. 1200 500 720    Blood 300  272.5    IV Piggyback 50      Total Intake(mL/kg) 1550 (12.3) 500 (3.9) 992.5 (7.8)    Urine (mL/kg/hr) 0 (0)      Emesis/NG output 0 (0)      Other 1600 (0.5)      Stool 0 (0)      Blood 0 (0)      Total Output 1600        Net -50 +500 +992.5            Urine Occurrence 2 x 2 x 4 x    Stool Occurrence 2 x 2 x 3 x    Emesis Occurrence 0 x            Physical Exam   Constitutional: He is oriented to person, place, and time. He appears well-developed. No distress.   Temporal and distal extremity muscle wasting   HENT:   Head: Normocephalic and atraumatic.   Eyes: EOM are normal. Pupils are equal, round, and reactive to light. No scleral icterus.   Cardiovascular: Normal rate, regular rhythm and normal heart sounds.    Pulmonary/Chest: Effort normal.   Coarse BS   Abdominal: Soft. Bowel sounds are normal. He exhibits distension (ascites). There is no tenderness.   - [employer] cdi no ssi   Musculoskeletal: He exhibits edema (3+ BLE).   Neurological: He is alert and oriented to person, place, and time.   Skin: Skin is warm and dry. He is not diaphoretic.   Psychiatric: He has a normal mood and affect. His behavior is normal. Judgment and thought content normal.   Nursing note and vitals reviewed.      Laboratory:  Immunosuppressants         Stop Route Frequency     tacrolimus capsule 5 mg      -- Oral 2 times daily     mycophenolate capsule 1,000 mg      -- Oral 2 times daily        CBC:     Recent Labs  Lab 08/03/18  1236   WBC 5.96   RBC 2.43*   HGB 7.9*   HCT 23.5*   PLT 62*   MCV 97   MCH 32.5*   MCHC 33.6     CMP:     Recent Labs  Lab 08/03/18  0430   *   CALCIUM 7.6*   ALBUMIN 2.5*   PROT 3.9*   *   K 4.2   CO2 21*      BUN 38*   CREATININE 3.4*   ALKPHOS 69   ALT 31   AST 14   BILITOT 0.5     Labs within the past 24 hours have been reviewed.    Diagnostic Results:  I have personally reviewed all pertinent imaging studies.  None    Assessment/Plan:     * Liver transplanted    - LFTs/enzymes normalized  - POD 1 U/s with elevated RIs  - Repeat liver U/S with improvement        Long-term use of immunosuppressant medication    - see prophylactic immuno.           Prophylactic immunotherapy    - continue prograf, cellcept and prednisone.  Monitor labs daily and adjust dose accordingly for a therapeutic level.           At risk for opportunistic infections    - Continue Bactrim, acyclovir, fluconazole          Sinus bradycardia    - Improved  - Atropine at bedside if HR decreases again          Positive blood culture in cadaveric donor    - E coli bacteremia in donor resistant to Cipro  - Rocephin x 14 days --> start date 7/23        Steroid-induced hyperglycemia    - Endocrine consulted          CKD (chronic kidney disease), stage III    - See VICKY.          Stage II pressure ulcer of sacral region    - Wound care following          Severe protein-calorie malnutrition    - Dietary consulted.  - Continue supplements.  - Pt eating well.        Physical deconditioning    - PT/OT NEEDED 5-6 DAYS Q WEEK  - PT IS SEVERELY DECONDITIONED         Pancytopenia    - Related to decompensated liver disease --> should improve now that pt transplanted.        Acute kidney injury superimposed on chronic kidney disease    - With CKD3.  - Expected VICKY post txp.  - UOP unclear as pt unable to measure urine (severe scrotal edema).  - Tolerated HD 8/1 PM  - HD again today  - D/C lasix 100 mg IV BID today as pt with HD today        Decompensated hepatic cirrhosis    - Placed status 7 on 6/26 for prescription drug coverage change. Re-activated 7/3. Back on hold d/t frailty and decompensation at the end of last week prompting ICU transfer. Now off hold 7/17.  Plan to submit MELD exception points. Will remeld today at 27.       MELD-Na score: 27 at 7/21/2018  4:01 AM  MELD score: 27 at 7/21/2018  4:01 AM  Calculated from:  Serum Creatinine: 3.7 mg/dL at 7/21/2018  4:00 AM  Serum Sodium: 147 mmol/L (Rounded to 137) at 7/21/2018  4:00 AM  Total Bilirubin: 1.6 mg/dL at 7/21/2018  4:00 AM  INR(ratio): 1.7 at 7/21/2018  4:01 AM  Age: 63 years        Coronary artery disease involving native coronary artery of native heart without angina pectoris    - Continue home ASA.   - 2D Echo  7/9 normal EF (55-60%), trivial tricuspid/mitral regurg.        Anemia of chronic disease    - H&H 6.6/20.3 8/1: Transfused 1 unit PRBCs  - H/H 6.4/19.7: Transfused 1 u PRBC  - Epo initiated today        Thrombocytopenia    - Secondary to splenomegaly from portal HTN- should improve with txp.  - No s/s overt bleed.        Seizures    - Continue oral Keppra.  - Keep Mg > 2.        Bilateral edema of lower extremity    - Continue intermittent dialysis.   - Tolerated HD well 8/1, again 8/3  - Wrapped by wound care with significant improvement            VTE Risk Mitigation         Ordered     heparin (porcine) injection 5,000 Units  Every 8 hours      07/26/18 1330     Place sequential compression device  Until discontinued      07/23/18 1326     IP VTE HIGH RISK PATIENT  Once      07/23/18 1242          The patients clinical status was discussed at multidisplinary rounds, involving transplant surgery, transplant medicine, pharmacy, nursing, nutrition, and social work    Discharge Planning:  Not a candidate for discharge    Bebeto Virgen PA-C  Liver Transplant  [facility]

## 2018-08-03 NOTE — ASSESSMENT & PLAN NOTE
- With CKD3.  - Expected VICKY post txp.  - UOP unclear as pt unable to measure urine (severe scrotal edema).  - Tolerated HD 8/1 PM  - HD again today  - D/C lasix 100 mg IV BID today as pt with HD today

## 2018-08-03 NOTE — SUBJECTIVE & OBJECTIVE
Scheduled Meds:   sodium chloride 0.9%   Intravenous Once    acyclovir  200 mg Oral BID    atorvastatin  40 mg Oral Daily    cefTRIAXone (ROCEPHIN) IVPB  2 g Intravenous Q24H    docusate sodium  100 mg Oral Daily    epoetin dread (PROCRIT) injection  50 Units/kg Intravenous Once    ergocalciferol  50,000 Units Oral Q7 Days    fluconazole  200 mg Oral Daily    furosemide  100 mg Intravenous BID    heparin (porcine)  5,000 Units Subcutaneous Q8H    insulin aspart U-100  5 Units Subcutaneous TIDWM    insulin detemir U-100  4 Units Subcutaneous BID    levETIRAcetam  500 mg Oral BID    miconazole nitrate 2%   Topical (Top) BID    mycophenolate  1,000 mg Oral BID    pantoprazole  40 mg Oral BID    predniSONE  20 mg Oral Daily    sulfamethoxazole-trimethoprim 400-80mg  1 tablet Oral Every Mon, Wed, Fri    tacrolimus  5 mg Oral BID    triamcinolone acetonide 0.1%   Topical (Top) BID     Continuous Infusions:  PRN Meds:sodium chloride, sodium chloride, sodium chloride, sodium chloride 0.9%, dextrose 50%, dextrose 50%, gelatin adsorbable 100cm top sponge, glucagon (human recombinant), glucose, glucose, insulin aspart U-100, ondansetron, oxyCODONE, oxyCODONE, sodium chloride 0.9%    Review of Systems   Constitutional: Positive for activity change, appetite change and fatigue. Negative for chills, diaphoresis and fever.   HENT: Negative for congestion and facial swelling.    Eyes: Negative for pain, discharge and visual disturbance.   Respiratory: Positive for cough. Negative for chest tightness, shortness of breath and wheezing.    Cardiovascular: Positive for leg swelling. Negative for chest pain and palpitations.   Gastrointestinal: Positive for abdominal distention. Negative for abdominal pain, constipation, diarrhea, nausea and vomiting.   Endocrine: Negative.    Genitourinary: Positive for decreased urine volume.   Musculoskeletal: Negative for back pain.   Skin: Positive for wound.    Allergic/Immunologic: Positive for immunocompromised state.   Neurological: Positive for weakness. Negative for dizziness, tremors and headaches.   Hematological: Negative.    Psychiatric/Behavioral: Negative for agitation, dysphoric mood and sleep disturbance. The patient is not nervous/anxious.      Objective:     Vital Signs (Most Recent):  Temp: 97.6 °F (36.4 °C) (08/03/18 1500)  Pulse: (!) 58 (08/03/18 1645)  Resp: (!) 21 (08/03/18 1500)  BP: 106/69 (08/03/18 1645)  SpO2: 99 % (08/03/18 1500) Vital Signs (24h Range):  Temp:  [97.6 °F (36.4 °C)-98.8 °F (37.1 °C)] 97.6 °F (36.4 °C)  Pulse:  [41-70] 58  Resp:  [16-21] 21  SpO2:  [95 %-99 %] 99 %  BP: (106-133)/(44-69) 106/69     Weight: 126.6 kg (279 lb 1.6 oz)  Body mass index is 35.83 kg/m².    Intake/Output - Last 3 Shifts       08/01 0700 - 08/02 0659 08/02 0700 - 08/03 0659 08/03 0700 - 08/04 0659    P.O. 1200 500 720    Blood 300  272.5    IV Piggyback 50      Total Intake(mL/kg) 1550 (12.3) 500 (3.9) 992.5 (7.8)    Urine (mL/kg/hr) 0 (0)      Emesis/NG output 0 (0)      Other 1600 (0.5)      Stool 0 (0)      Blood 0 (0)      Total Output 1600        Net -50 +500 +992.5           Urine Occurrence 2 x 2 x 4 x    Stool Occurrence 2 x 2 x 3 x    Emesis Occurrence 0 x            Physical Exam   Constitutional: He is oriented to person, place, and time. He appears well-developed. No distress.   Temporal and distal extremity muscle wasting   HENT:   Head: Normocephalic and atraumatic.   Eyes: EOM are normal. Pupils are equal, round, and reactive to light. No scleral icterus.   Cardiovascular: Normal rate, regular rhythm and normal heart sounds.    Pulmonary/Chest: Effort normal.   Coarse BS   Abdominal: Soft. Bowel sounds are normal. He exhibits distension (ascites). There is no tenderness.   - [employer] cdi no ssi   Musculoskeletal: He exhibits edema (3+ BLE).   Neurological: He is alert and oriented to person, place, and time.   Skin: Skin is warm and dry.  He is not diaphoretic.   Psychiatric: He has a normal mood and affect. His behavior is normal. Judgment and thought content normal.   Nursing note and vitals reviewed.      Laboratory:  Immunosuppressants         Stop Route Frequency     tacrolimus capsule 5 mg      -- Oral 2 times daily     mycophenolate capsule 1,000 mg      -- Oral 2 times daily        CBC:     Recent Labs  Lab 08/03/18  1236   WBC 5.96   RBC 2.43*   HGB 7.9*   HCT 23.5*   PLT 62*   MCV 97   MCH 32.5*   MCHC 33.6     CMP:     Recent Labs  Lab 08/03/18  0430   *   CALCIUM 7.6*   ALBUMIN 2.5*   PROT 3.9*   *   K 4.2   CO2 21*      BUN 38*   CREATININE 3.4*   ALKPHOS 69   ALT 31   AST 14   BILITOT 0.5     Labs within the past 24 hours have been reviewed.    Diagnostic Results:  I have personally reviewed all pertinent imaging studies.  None

## 2018-08-03 NOTE — SUBJECTIVE & OBJECTIVE
Interval History: patient feeling fine this morning, there are 15 unmeasured urinations, but volumes uncertain sCr 2.8 --> 3.4    Review of patient's allergies indicates:   Allergen Reactions    Nsaids (non-steroidal anti-inflammatory drug)      D/t to liver disease.    Tylenol [acetaminophen]      D/t liver disease.    Penicillins Other (See Comments)     Tolerated pip-tazo in 8/2016 without issue  Tolerated cefepime 7/2018 without issue  Since childhood was told not to take it     Current Facility-Administered Medications   Medication Frequency    0.9%  NaCl infusion (for blood administration) Q24H PRN    0.9%  NaCl infusion (for blood administration) Q24H PRN    0.9%  NaCl infusion (for blood administration) Q24H PRN    0.9%  NaCl infusion PRN    0.9%  NaCl infusion Once    acyclovir capsule 200 mg BID    atorvastatin tablet 40 mg Daily    cefTRIAXone (ROCEPHIN) 2 g in dextrose 5 % 50 mL IVPB Q24H    dextrose 50% injection 12.5 g PRN    dextrose 50% injection 25 g PRN    docusate sodium capsule 100 mg Daily    ergocalciferol capsule 50,000 Units Q7 Days    fluconazole tablet 200 mg Daily    furosemide injection 100 mg BID    gelatin adsorbable 100cm top sponge 100 sponge 1 applicator PRN    glucagon (human recombinant) injection 1 mg PRN    glucose chewable tablet 16 g PRN    glucose chewable tablet 24 g PRN    heparin (porcine) injection 5,000 Units Q8H    insulin aspart U-100 pen 0-5 Units QID (AC + HS) PRN    insulin aspart U-100 pen 5 Units TIDWM    insulin detemir U-100 pen 4 Units BID    levETIRAcetam tablet 500 mg BID    mycophenolate capsule 1,000 mg BID    ondansetron injection 4 mg Q8H PRN    oxyCODONE immediate release tablet 10 mg Q6H PRN    oxyCODONE immediate release tablet 5 mg Q6H PRN    pantoprazole EC tablet 40 mg BID    predniSONE tablet 20 mg Daily    sodium chloride 0.9% flush 3 mL PRN    sulfamethoxazole-trimethoprim 400-80mg per tablet 1 tablet Every Mon,  Wed, Fri    tacrolimus capsule 5 mg BID    triamcinolone acetonide 0.1% cream BID       Objective:     Vital Signs (Most Recent):  Temp: 98 °F (36.7 °C) (08/03/18 1158)  Pulse: (!) 48 (08/03/18 1200)  Resp: 17 (08/03/18 1158)  BP: (!) 125/58 (08/03/18 1158)  SpO2: 96 % (08/03/18 1158)  O2 Device (Oxygen Therapy): room air (08/03/18 1100) Vital Signs (24h Range):  Temp:  [97.8 °F (36.6 °C)-98.8 °F (37.1 °C)] 98 °F (36.7 °C)  Pulse:  [] 48  Resp:  [16-18] 17  SpO2:  [95 %-99 %] 96 %  BP: (110-134)/(56-61) 125/58     Weight: 126.6 kg (279 lb 1.6 oz) (08/03/18 0500)  Body mass index is 35.83 kg/m².  Body surface area is 2.57 meters squared.    I/O last 3 completed shifts:  In: 950 [P.O.:950]  Out: -     Physical Exam   Constitutional: He is oriented to person, place, and time.   HENT:   Head: Atraumatic.   Neck: No JVD present.   Cardiovascular: Normal rate and regular rhythm.  Exam reveals no friction rub.    Pulmonary/Chest: Effort normal and breath sounds normal.   Abdominal: Soft. He exhibits distension.   Musculoskeletal: He exhibits edema.   Neurological: He is alert and oriented to person, place, and time.   Skin: Skin is warm.   Psychiatric: He has a normal mood and affect.

## 2018-08-03 NOTE — ASSESSMENT & PLAN NOTE
Avoid insulin stacking and hypoglycemia.  Lab Results   Component Value Date    CREATININE 3.4 (H) 08/03/2018

## 2018-08-03 NOTE — ASSESSMENT & PLAN NOTE
No previous history of DM  BG goal 140-180    Levemir 4 BID.  Continue novolog 5 units with meals.  BG monitoring ac/hs and low dose correction scale.     Discharge:  TBD.

## 2018-08-04 LAB
ALBUMIN SERPL BCP-MCNC: 2.5 G/DL
ALP SERPL-CCNC: 73 U/L
ALT SERPL W/O P-5'-P-CCNC: 33 U/L
ANION GAP SERPL CALC-SCNC: 4 MMOL/L
AST SERPL-CCNC: 18 U/L
BASOPHILS # BLD AUTO: 0.03 K/UL
BASOPHILS NFR BLD: 0.7 %
BILIRUB SERPL-MCNC: 0.5 MG/DL
BUN SERPL-MCNC: 26 MG/DL
CALCIUM SERPL-MCNC: 7.7 MG/DL
CHLORIDE SERPL-SCNC: 105 MMOL/L
CO2 SERPL-SCNC: 26 MMOL/L
CREAT SERPL-MCNC: 2.7 MG/DL
DIFFERENTIAL METHOD: ABNORMAL
EOSINOPHIL # BLD AUTO: 0.1 K/UL
EOSINOPHIL NFR BLD: 2.2 %
ERYTHROCYTE [DISTWIDTH] IN BLOOD BY AUTOMATED COUNT: 16.8 %
EST. GFR  (AFRICAN AMERICAN): 27.9 ML/MIN/1.73 M^2
EST. GFR  (NON AFRICAN AMERICAN): 24.2 ML/MIN/1.73 M^2
GLUCOSE SERPL-MCNC: 114 MG/DL
HCT VFR BLD AUTO: 23.3 %
HGB BLD-MCNC: 7.8 G/DL
IMM GRANULOCYTES # BLD AUTO: 0.08 K/UL
IMM GRANULOCYTES NFR BLD AUTO: 1.7 %
LYMPHOCYTES # BLD AUTO: 0.6 K/UL
LYMPHOCYTES NFR BLD: 12 %
MAGNESIUM SERPL-MCNC: 1.6 MG/DL
MCH RBC QN AUTO: 32.6 PG
MCHC RBC AUTO-ENTMCNC: 33.5 G/DL
MCV RBC AUTO: 98 FL
MONOCYTES # BLD AUTO: 0.6 K/UL
MONOCYTES NFR BLD: 12.6 %
NEUTROPHILS # BLD AUTO: 3.3 K/UL
NEUTROPHILS NFR BLD: 70.8 %
NRBC BLD-RTO: 0 /100 WBC
PHOSPHATE SERPL-MCNC: 3.1 MG/DL
PLATELET # BLD AUTO: 41 K/UL
PMV BLD AUTO: 11.9 FL
POCT GLUCOSE: 139 MG/DL (ref 70–110)
POCT GLUCOSE: 147 MG/DL (ref 70–110)
POCT GLUCOSE: 182 MG/DL (ref 70–110)
POCT GLUCOSE: 88 MG/DL (ref 70–110)
POTASSIUM SERPL-SCNC: 3.8 MMOL/L
PROT SERPL-MCNC: 4.1 G/DL
RBC # BLD AUTO: 2.39 M/UL
SODIUM SERPL-SCNC: 135 MMOL/L
TACROLIMUS BLD-MCNC: 8.7 NG/ML
WBC # BLD AUTO: 4.59 K/UL

## 2018-08-04 PROCEDURE — 25000003 PHARM REV CODE 250: Performed by: SURGERY

## 2018-08-04 PROCEDURE — 25000003 PHARM REV CODE 250: Performed by: NURSE PRACTITIONER

## 2018-08-04 PROCEDURE — 80053 COMPREHEN METABOLIC PANEL: CPT

## 2018-08-04 PROCEDURE — P9047 ALBUMIN (HUMAN), 25%, 50ML: HCPCS | Mod: JG | Performed by: NURSE PRACTITIONER

## 2018-08-04 PROCEDURE — 25000003 PHARM REV CODE 250: Performed by: PHYSICIAN ASSISTANT

## 2018-08-04 PROCEDURE — 63600175 PHARM REV CODE 636 W HCPCS: Performed by: STUDENT IN AN ORGANIZED HEALTH CARE EDUCATION/TRAINING PROGRAM

## 2018-08-04 PROCEDURE — 80197 ASSAY OF TACROLIMUS: CPT

## 2018-08-04 PROCEDURE — 25000003 PHARM REV CODE 250: Performed by: STUDENT IN AN ORGANIZED HEALTH CARE EDUCATION/TRAINING PROGRAM

## 2018-08-04 PROCEDURE — 63600175 PHARM REV CODE 636 W HCPCS: Performed by: SURGERY

## 2018-08-04 PROCEDURE — 83735 ASSAY OF MAGNESIUM: CPT

## 2018-08-04 PROCEDURE — 84100 ASSAY OF PHOSPHORUS: CPT

## 2018-08-04 PROCEDURE — 63600175 PHARM REV CODE 636 W HCPCS: Performed by: NURSE PRACTITIONER

## 2018-08-04 PROCEDURE — 99233 SBSQ HOSP IP/OBS HIGH 50: CPT | Mod: 24,,, | Performed by: NURSE PRACTITIONER

## 2018-08-04 PROCEDURE — 85025 COMPLETE CBC W/AUTO DIFF WBC: CPT

## 2018-08-04 PROCEDURE — 20600001 HC STEP DOWN PRIVATE ROOM

## 2018-08-04 PROCEDURE — 63600175 PHARM REV CODE 636 W HCPCS: Performed by: PEDIATRICS

## 2018-08-04 PROCEDURE — 27000646 HC AEROBIKA DEVICE

## 2018-08-04 RX ORDER — INSULIN ASPART 100 [IU]/ML
3 INJECTION, SOLUTION INTRAVENOUS; SUBCUTANEOUS
Status: DISCONTINUED | OUTPATIENT
Start: 2018-08-04 | End: 2018-08-04

## 2018-08-04 RX ORDER — INSULIN ASPART 100 [IU]/ML
2 INJECTION, SOLUTION INTRAVENOUS; SUBCUTANEOUS
Status: DISCONTINUED | OUTPATIENT
Start: 2018-08-04 | End: 2018-08-07

## 2018-08-04 RX ORDER — TACROLIMUS 1 MG/1
4 CAPSULE ORAL 2 TIMES DAILY
Status: DISCONTINUED | OUTPATIENT
Start: 2018-08-04 | End: 2018-08-15

## 2018-08-04 RX ORDER — SULFAMETHOXAZOLE AND TRIMETHOPRIM 400; 80 MG/1; MG/1
1 TABLET ORAL DAILY
Status: DISCONTINUED | OUTPATIENT
Start: 2018-08-04 | End: 2018-08-06 | Stop reason: SDUPTHER

## 2018-08-04 RX ORDER — ACYCLOVIR 200 MG/1
400 CAPSULE ORAL 2 TIMES DAILY
Status: DISCONTINUED | OUTPATIENT
Start: 2018-08-04 | End: 2018-08-17 | Stop reason: HOSPADM

## 2018-08-04 RX ORDER — ALBUMIN HUMAN 250 G/1000ML
25 SOLUTION INTRAVENOUS EVERY 4 HOURS
Status: COMPLETED | OUTPATIENT
Start: 2018-08-04 | End: 2018-08-04

## 2018-08-04 RX ORDER — FUROSEMIDE 10 MG/ML
60 INJECTION INTRAMUSCULAR; INTRAVENOUS ONCE
Status: COMPLETED | OUTPATIENT
Start: 2018-08-04 | End: 2018-08-04

## 2018-08-04 RX ADMIN — ALBUMIN HUMAN 25 G: 0.25 SOLUTION INTRAVENOUS at 01:08

## 2018-08-04 RX ADMIN — TRIAMCINOLONE ACETONIDE: 1 CREAM TOPICAL at 08:08

## 2018-08-04 RX ADMIN — ALBUMIN HUMAN 25 G: 0.25 SOLUTION INTRAVENOUS at 10:08

## 2018-08-04 RX ADMIN — MICONAZOLE NITRATE: 20 OINTMENT TOPICAL at 08:08

## 2018-08-04 RX ADMIN — TACROLIMUS 4 MG: 1 CAPSULE ORAL at 05:08

## 2018-08-04 RX ADMIN — PANTOPRAZOLE SODIUM 40 MG: 40 TABLET, DELAYED RELEASE ORAL at 08:08

## 2018-08-04 RX ADMIN — ATORVASTATIN CALCIUM 40 MG: 20 TABLET, FILM COATED ORAL at 08:08

## 2018-08-04 RX ADMIN — HEPARIN SODIUM 5000 UNITS: 5000 INJECTION, SOLUTION INTRAVENOUS; SUBCUTANEOUS at 04:08

## 2018-08-04 RX ADMIN — HEPARIN SODIUM 5000 UNITS: 5000 INJECTION, SOLUTION INTRAVENOUS; SUBCUTANEOUS at 01:08

## 2018-08-04 RX ADMIN — PREDNISONE 20 MG: 20 TABLET ORAL at 08:08

## 2018-08-04 RX ADMIN — TACROLIMUS 5 MG: 5 CAPSULE ORAL at 08:08

## 2018-08-04 RX ADMIN — FUROSEMIDE 60 MG: 10 INJECTION, SOLUTION INTRAMUSCULAR; INTRAVENOUS at 11:08

## 2018-08-04 RX ADMIN — DOCUSATE SODIUM 100 MG: 100 CAPSULE, LIQUID FILLED ORAL at 08:08

## 2018-08-04 RX ADMIN — MYCOPHENOLATE MOFETIL 1000 MG: 250 CAPSULE ORAL at 08:08

## 2018-08-04 RX ADMIN — ACYCLOVIR 400 MG: 200 CAPSULE ORAL at 08:08

## 2018-08-04 RX ADMIN — ONDANSETRON 4 MG: 2 INJECTION INTRAMUSCULAR; INTRAVENOUS at 08:08

## 2018-08-04 RX ADMIN — INSULIN ASPART 2 UNITS: 100 INJECTION, SOLUTION INTRAVENOUS; SUBCUTANEOUS at 09:08

## 2018-08-04 RX ADMIN — SULFAMETHOXAZOLE AND TRIMETHOPRIM 1 TABLET: 400; 80 TABLET ORAL at 09:08

## 2018-08-04 RX ADMIN — MYCOPHENOLATE MOFETIL 1000 MG: 250 CAPSULE ORAL at 09:08

## 2018-08-04 RX ADMIN — FLUCONAZOLE 200 MG: 200 TABLET ORAL at 08:08

## 2018-08-04 RX ADMIN — HEPARIN SODIUM 5000 UNITS: 5000 INJECTION, SOLUTION INTRAVENOUS; SUBCUTANEOUS at 08:08

## 2018-08-04 RX ADMIN — INSULIN ASPART 2 UNITS: 100 INJECTION, SOLUTION INTRAVENOUS; SUBCUTANEOUS at 05:08

## 2018-08-04 RX ADMIN — ACYCLOVIR 200 MG: 200 CAPSULE ORAL at 08:08

## 2018-08-04 RX ADMIN — LEVETIRACETAM 500 MG: 500 TABLET ORAL at 08:08

## 2018-08-04 RX ADMIN — CEFTRIAXONE SODIUM 2 G: 2 INJECTION, POWDER, FOR SOLUTION INTRAMUSCULAR; INTRAVENOUS at 05:08

## 2018-08-04 NOTE — CARE UPDATE
Remains in TSU. BG below goal overnight. Discontinue Levemir. Decrease novolog 2 units with meals. Continue bg monitoring ac/hs and low dose correction scale. ** Please call Endocrine for any BG related issues **

## 2018-08-04 NOTE — ASSESSMENT & PLAN NOTE
- H&H 6.6/20.3 8/1: Transfused 1 unit PRBCs  - H/H 6.4/19.7: Transfused 1 u PRBC  - Epo started on 8/3

## 2018-08-04 NOTE — PROGRESS NOTES
[facility]  Liver Transplant  Progress Note    Patient Name: Alon Adamson  MRN: [id number]  Admission Date: 2018  Hospital Length of Stay: 43 days  Code Status: Full Code  Primary Care Provider: Mckinley Vides MD  Post-Operative Day: 12    ORGAN:   LIVER  Disease Etiology: Alcoholic Cirrhosis  Donor Type:    - Brain Death  CDC High Risk:   No  Donor CMV Status:   Donor CMV Status: Negative  Donor HBcAB:   Negative  Donor HCV Status:   Negative  Donor HBV TAMAR: Negative  Donor HCV TAMAR: Negative  Whole or Partial: Whole Liver  Biliary Anastomosis: End to End  Arterial Anatomy: Standard  Subjective:     History of Present Illness:  Mr. Adamson is a 64 yo M with PMH ESLD secondary to ETOH cirrhosis, CKD3, CAD.  Complications of liver disease include hyperbilirubinemia, hypoalbuminemia, severe malnutrition,   hepatic encephalopathy, thrombocytopenia, splenomegaly, ascites, hypervolemia, coagulopathy, portal HTN.  Pt admitted  for acutely worsening hepatic encephalopathy and concern for BLE cellulitis as well as severe volume overload.  Complications of ELSD managed while inpt and he received liver offer on 18.  Pt underwent liver transplant without complication: steroid induction, DBD, CMV -/-.  Donor with E coli bacteremia resistant to cipro placed on Rocephin x 2 weeks per ID recs.  Also on fluconazole x 30 days as pt very ill prior to transplant. Post transplant, with nice trend down of AST/ALT and LFTs.  Liver U/S with elevated RIs and decreased HAV.  During initial post operative period, pt required pressor support.  Pt with expected post operative VICKY as with VICKY prior to surgery.  Nephrology consulted and pt placed on intermittent CRRT.  Pressor requirement decreased and pt tolerated CRRT without pressors.  Pt with asymptomatic bradycardia while in ICU prompting cardiology consult. Pt without need for acute intervention but atropine placed at bedside.  HR has improved POD  7 and 8.  He was transferred to the TSU 7/30 PM.          Hospital Course:  Interval history: no acute events overnight. Pt feeling well this am and looks more alert. Pt admitted to one episode of nausea with emesis in the am. Tolerated HD 8/1 and 8/3. Liver US 7/31 showed small fluid collection likely an adrenal hemorrhage vs hematoma. H&H decreased to 6.6/20.3 8/1- responded to 1 u PRBCs. H/H stable 8/2 AM. Dropped again 8/3 AM- 1 u PRBC given. H&H now stable. Iron studies checked and pt started on epo.  No active bleed.  Liver U/S repeated and negative for sign of bleed, results satisfactory.  Pt continues to have difficulty with urinating due to scrotal edema. Continue PT/OT and encourage supplements for malnutrition.     Scheduled Meds:   acyclovir  400 mg Oral BID    albumin human 25%  25 g Intravenous Q4H    atorvastatin  40 mg Oral Daily    cefTRIAXone (ROCEPHIN) IVPB  2 g Intravenous Q24H    docusate sodium  100 mg Oral Daily    ergocalciferol  50,000 Units Oral Q7 Days    fluconazole  200 mg Oral Daily    heparin (porcine)  5,000 Units Subcutaneous Q8H    insulin aspart U-100  2 Units Subcutaneous TIDWM    levETIRAcetam  500 mg Oral BID    miconazole nitrate 2%   Topical (Top) BID    mycophenolate  1,000 mg Oral BID    pantoprazole  40 mg Oral BID    [START ON 8/5/2018] predniSONE  15 mg Oral Daily    sulfamethoxazole-trimethoprim 400-80mg  1 tablet Oral Daily    tacrolimus  4 mg Oral BID    triamcinolone acetonide 0.1%   Topical (Top) BID     Continuous Infusions:  PRN Meds:sodium chloride, sodium chloride, sodium chloride, sodium chloride 0.9%, dextrose 50%, dextrose 50%, gelatin adsorbable 100cm top sponge, glucagon (human recombinant), glucose, glucose, insulin aspart U-100, ondansetron, oxyCODONE, oxyCODONE, sodium chloride 0.9%    Review of Systems   Constitutional: Positive for activity change, appetite change and fatigue. Negative for chills, diaphoresis and fever.   HENT: Negative  for congestion and facial swelling.    Eyes: Negative for pain, discharge and visual disturbance.   Respiratory: Positive for cough. Negative for chest tightness, shortness of breath and wheezing.    Cardiovascular: Positive for leg swelling. Negative for chest pain and palpitations.   Gastrointestinal: Positive for abdominal distention. Negative for abdominal pain, constipation, diarrhea, nausea and vomiting.   Endocrine: Negative.    Genitourinary: Positive for decreased urine volume.   Musculoskeletal: Negative for back pain.   Skin: Positive for wound.   Allergic/Immunologic: Positive for immunocompromised state.   Neurological: Positive for weakness. Negative for dizziness, tremors and headaches.   Hematological: Negative.    Psychiatric/Behavioral: Negative for agitation, dysphoric mood and sleep disturbance. The patient is not nervous/anxious.      Objective:     Vital Signs (Most Recent):  Temp: 97.8 °F (36.6 °C) (08/04/18 1120)  Pulse: (!) 53 (08/04/18 1158)  Resp: 17 (08/04/18 1120)  BP: 127/62 (08/04/18 1120)  SpO2: 98 % (08/04/18 1120) Vital Signs (24h Range):  Temp:  [97.6 °F (36.4 °C)-98.6 °F (37 °C)] 97.8 °F (36.6 °C)  Pulse:  [41-63] 53  Resp:  [17-21] 17  SpO2:  [94 %-99 %] 98 %  BP: (103-147)/(42-69) 127/62     Weight: 125.2 kg (276 lb)  Body mass index is 35.44 kg/m².    Intake/Output - Last 3 Shifts       08/02 0700 - 08/03 0659 08/03 0700 - 08/04 0659 08/04 0700 - 08/05 0659    P.O. 500 900 120    Blood  272.5     IV Piggyback  50     Total Intake(mL/kg) 500 (3.9) 1222.5 (9.8) 120 (1)    Other  1600     Total Output   1600      Net +500 -377.5 +120           Urine Occurrence 2 x 4 x 2 x    Stool Occurrence 2 x 3 x 1 x          Physical Exam   Constitutional: He is oriented to person, place, and time. He appears well-developed. No distress.   Temporal and distal extremity muscle wasting   HENT:   Head: Normocephalic and atraumatic.   Eyes: EOM are normal. Pupils are equal, round, and reactive to  light. No scleral icterus.   Cardiovascular: Normal rate, regular rhythm and normal heart sounds.    Pulmonary/Chest: Effort normal.   Coarse BS   Abdominal: Soft. Bowel sounds are normal. He exhibits distension (ascites). There is no tenderness.   - [employer] cdi no ssi   Musculoskeletal: He exhibits edema (3+ BLE).   Neurological: He is alert and oriented to person, place, and time.   Skin: Skin is warm and dry. He is not diaphoretic.   Psychiatric: He has a normal mood and affect. His behavior is normal. Judgment and thought content normal.   Nursing note and vitals reviewed.      Laboratory:  Immunosuppressants         Stop Route Frequency     tacrolimus capsule 4 mg      -- Oral 2 times daily     mycophenolate capsule 1,000 mg      -- Oral 2 times daily        CBC:   Recent Labs  Lab 08/04/18  0423   WBC 4.59   RBC 2.39*   HGB 7.8*   HCT 23.3*   PLT 41*   MCV 98   MCH 32.6*   MCHC 33.5     CMP:   Recent Labs  Lab 08/04/18  0423   *   CALCIUM 7.7*   ALBUMIN 2.5*   PROT 4.1*   *   K 3.8   CO2 26      BUN 26*   CREATININE 2.7*   ALKPHOS 73   ALT 33   AST 18   BILITOT 0.5     Coagulation:   Recent Labs  Lab 07/31/18  0500   INR 1.2   APTT 28.3     Labs within the past 24 hours have been reviewed.    Diagnostic Results:  None    Assessment/Plan:     * Liver transplanted    - LFTs/enzymes normalized  - POD 1 U/s with elevated RIs  - Repeat liver U/S with improvement  - progressing well post-op.         Long-term use of immunosuppressant medication    - see prophylactic immuno.           Prophylactic immunotherapy    - continue prograf, cellcept and prednisone. Monitor labs daily and adjust dose accordingly for a therapeutic level.           At risk for opportunistic infections    - Continue Bactrim, acyclovir, fluconazole          Sinus bradycardia    - Improved  - Atropine at bedside if HR decreases again          Positive blood culture in cadaveric donor    - E coli bacteremia in donor  resistant to Cipro  - Rocephin x 14 days --> stop date 8/6        Steroid-induced hyperglycemia    - Endocrine consulted          CKD (chronic kidney disease), stage III    - See VICKY.          Stage II pressure ulcer of sacral region    - Wound care following          Severe protein-calorie malnutrition    - Dietary consulted.  - Continue supplements.  - Pt eating well.        Physical deconditioning    - PT/OT NEEDED 5-6 DAYS Q WEEK  - PT IS SEVERELY DECONDITIONED         Pancytopenia    - Related to decompensated liver disease --> should improve now that pt transplanted.        Acute kidney injury superimposed on chronic kidney disease    - With CKD3.  - Expected VICKY post txp.  - UOP unclear as pt unable to measure urine (severe scrotal edema).  - Tolerated HD 8/1 and 8/3.   - plan for albumin and lasix today to promote diuresis.   - Nephrology following and HD per their recs.         Decompensated hepatic cirrhosis    - Placed status 7 on 6/26 for prescription drug coverage change. Re-activated 7/3. Back on hold d/t frailty and decompensation at the end of last week prompting ICU transfer. Now off hold 7/17.  Plan to submit MELD exception points. Will remeld today at 27.       MELD-Na score: 27 at 7/21/2018  4:01 AM  MELD score: 27 at 7/21/2018  4:01 AM  Calculated from:  Serum Creatinine: 3.7 mg/dL at 7/21/2018  4:00 AM  Serum Sodium: 147 mmol/L (Rounded to 137) at 7/21/2018  4:00 AM  Total Bilirubin: 1.6 mg/dL at 7/21/2018  4:00 AM  INR(ratio): 1.7 at 7/21/2018  4:01 AM  Age: 63 years        Coronary artery disease involving native coronary artery of native heart without angina pectoris    - Continue home ASA.   - 2D Echo 7/9 normal EF (55-60%), trivial tricuspid/mitral regurg.        Anemia of chronic disease    - H&H 6.6/20.3 8/1: Transfused 1 unit PRBCs  - H/H 6.4/19.7: Transfused 1 u PRBC  - Epo started on 8/3        Thrombocytopenia    - Secondary to splenomegaly from portal HTN- should improve with  txp.  - No s/s overt bleed.        Seizures    - Continue oral Keppra.  - Keep Mg > 2.        Bilateral edema of lower extremity    - Continue intermittent dialysis.   - Tolerated HD well 8/1, again 8/3  - Wrapped by wound care with significant improvement        Hepatic encephalopathy    - ESLD secondary to EtOH cirrhosis with severe complications (hyperbilirubinemia, hypoalbuminemia, severe malnutrition, hepatic encephalopathy, thrombocytopenia, splenomegaly, ascites, hypervolemia, coaguloopathy, portal HTN), seizure disorder on Keppra, CKD3, CAD   - has frequent episodes of lethargy/confusion/slowing when holding lactulose   - admitted to SICU 7/13/18 for worsening encephalopathy and hypothermia  - PO lactulose TID, PRN lactulose enemas, Rifaximin  - sleepy this AM. Given lactulose enema with improvement in mentation. Continue oral lactulose and PRN lactulose enemas for HE control.               VTE Risk Mitigation         Ordered     heparin (porcine) injection 5,000 Units  Every 8 hours      07/26/18 1330     Place sequential compression device  Until discontinued      07/23/18 1326     IP VTE HIGH RISK PATIENT  Once      07/23/18 1242          The patients clinical status was discussed at multidisplinary rounds, involving transplant surgery, transplant medicine, pharmacy, nursing, nutrition, and social work    Discharge Planning:  Not a candidate for d/c at this time.     Jonathan Lopez NP  Liver Transplant  [facility]

## 2018-08-04 NOTE — ASSESSMENT & PLAN NOTE
- With CKD3.  - Expected VICKY post txp.  - UOP unclear as pt unable to measure urine (severe scrotal edema).  - Tolerated HD 8/1 and 8/3.   - plan for albumin and lasix today to promote diuresis.   - Nephrology following and HD per their recs.

## 2018-08-04 NOTE — ASSESSMENT & PLAN NOTE
- LFTs/enzymes normalized  - POD 1 U/s with elevated RIs  - Repeat liver U/S with improvement  - progressing well post-op.

## 2018-08-04 NOTE — PLAN OF CARE
Problem: Patient Care Overview  Goal: Plan of Care Review  Outcome: Ongoing (interventions implemented as appropriate)  Fall precautions maintained. No falls or injuries noted thus far. Weight being shifted as needed w/ 2-person assistance. No pain issues verbalized today. + c/o nausea/vomiting. Zofran given x 1, pt w/ relief. Patient w/ urinary incontinence secondary to scrotal edema. Incontinence pads changed. Cream placed to sacrum w/ incontinence care. CBG being monitored before meals, ranging . Insulin orders adjusted today. Telemetry continued, SB on the monitor. Spouse at bedside and attentive to patient, pulling self medications w/ 100% accuracy. Personal items in close reach.

## 2018-08-04 NOTE — SUBJECTIVE & OBJECTIVE
Scheduled Meds:   acyclovir  400 mg Oral BID    albumin human 25%  25 g Intravenous Q4H    atorvastatin  40 mg Oral Daily    cefTRIAXone (ROCEPHIN) IVPB  2 g Intravenous Q24H    docusate sodium  100 mg Oral Daily    ergocalciferol  50,000 Units Oral Q7 Days    fluconazole  200 mg Oral Daily    heparin (porcine)  5,000 Units Subcutaneous Q8H    insulin aspart U-100  2 Units Subcutaneous TIDWM    levETIRAcetam  500 mg Oral BID    miconazole nitrate 2%   Topical (Top) BID    mycophenolate  1,000 mg Oral BID    pantoprazole  40 mg Oral BID    [START ON 8/5/2018] predniSONE  15 mg Oral Daily    sulfamethoxazole-trimethoprim 400-80mg  1 tablet Oral Daily    tacrolimus  4 mg Oral BID    triamcinolone acetonide 0.1%   Topical (Top) BID     Continuous Infusions:  PRN Meds:sodium chloride, sodium chloride, sodium chloride, sodium chloride 0.9%, dextrose 50%, dextrose 50%, gelatin adsorbable 100cm top sponge, glucagon (human recombinant), glucose, glucose, insulin aspart U-100, ondansetron, oxyCODONE, oxyCODONE, sodium chloride 0.9%    Review of Systems   Constitutional: Positive for activity change, appetite change and fatigue. Negative for chills, diaphoresis and fever.   HENT: Negative for congestion and facial swelling.    Eyes: Negative for pain, discharge and visual disturbance.   Respiratory: Positive for cough. Negative for chest tightness, shortness of breath and wheezing.    Cardiovascular: Positive for leg swelling. Negative for chest pain and palpitations.   Gastrointestinal: Positive for abdominal distention. Negative for abdominal pain, constipation, diarrhea, nausea and vomiting.   Endocrine: Negative.    Genitourinary: Positive for decreased urine volume.   Musculoskeletal: Negative for back pain.   Skin: Positive for wound.   Allergic/Immunologic: Positive for immunocompromised state.   Neurological: Positive for weakness. Negative for dizziness, tremors and headaches.   Hematological:  Negative.    Psychiatric/Behavioral: Negative for agitation, dysphoric mood and sleep disturbance. The patient is not nervous/anxious.      Objective:     Vital Signs (Most Recent):  Temp: 97.8 °F (36.6 °C) (08/04/18 1120)  Pulse: (!) 53 (08/04/18 1158)  Resp: 17 (08/04/18 1120)  BP: 127/62 (08/04/18 1120)  SpO2: 98 % (08/04/18 1120) Vital Signs (24h Range):  Temp:  [97.6 °F (36.4 °C)-98.6 °F (37 °C)] 97.8 °F (36.6 °C)  Pulse:  [41-63] 53  Resp:  [17-21] 17  SpO2:  [94 %-99 %] 98 %  BP: (103-147)/(42-69) 127/62     Weight: 125.2 kg (276 lb)  Body mass index is 35.44 kg/m².    Intake/Output - Last 3 Shifts       08/02 0700 - 08/03 0659 08/03 0700 - 08/04 0659 08/04 0700 - 08/05 0659    P.O. 500 900 120    Blood  272.5     IV Piggyback  50     Total Intake(mL/kg) 500 (3.9) 1222.5 (9.8) 120 (1)    Other  1600     Total Output   1600      Net +500 -377.5 +120           Urine Occurrence 2 x 4 x 2 x    Stool Occurrence 2 x 3 x 1 x          Physical Exam   Constitutional: He is oriented to person, place, and time. He appears well-developed. No distress.   Temporal and distal extremity muscle wasting   HENT:   Head: Normocephalic and atraumatic.   Eyes: EOM are normal. Pupils are equal, round, and reactive to light. No scleral icterus.   Cardiovascular: Normal rate, regular rhythm and normal heart sounds.    Pulmonary/Chest: Effort normal.   Coarse BS   Abdominal: Soft. Bowel sounds are normal. He exhibits distension (ascites). There is no tenderness.   - [employer] cdi no ssi   Musculoskeletal: He exhibits edema (3+ BLE).   Neurological: He is alert and oriented to person, place, and time.   Skin: Skin is warm and dry. He is not diaphoretic.   Psychiatric: He has a normal mood and affect. His behavior is normal. Judgment and thought content normal.   Nursing note and vitals reviewed.      Laboratory:  Immunosuppressants         Stop Route Frequency     tacrolimus capsule 4 mg      -- Oral 2 times daily     mycophenolate  capsule 1,000 mg      -- Oral 2 times daily        CBC:   Recent Labs  Lab 08/04/18  0423   WBC 4.59   RBC 2.39*   HGB 7.8*   HCT 23.3*   PLT 41*   MCV 98   MCH 32.6*   MCHC 33.5     CMP:   Recent Labs  Lab 08/04/18  0423   *   CALCIUM 7.7*   ALBUMIN 2.5*   PROT 4.1*   *   K 3.8   CO2 26      BUN 26*   CREATININE 2.7*   ALKPHOS 73   ALT 33   AST 18   BILITOT 0.5     Coagulation:   Recent Labs  Lab 07/31/18  0500   INR 1.2   APTT 28.3     Labs within the past 24 hours have been reviewed.    Diagnostic Results:  None

## 2018-08-05 LAB
ABO + RH BLD: NORMAL
ALBUMIN SERPL BCP-MCNC: 2.9 G/DL
ALP SERPL-CCNC: 66 U/L
ALT SERPL W/O P-5'-P-CCNC: 22 U/L
ANION GAP SERPL CALC-SCNC: 7 MMOL/L
AST SERPL-CCNC: 13 U/L
BASOPHILS # BLD AUTO: 0.02 K/UL
BASOPHILS NFR BLD: 0.5 %
BILIRUB SERPL-MCNC: 0.5 MG/DL
BLD GP AB SCN CELLS X3 SERPL QL: NORMAL
BLD PROD TYP BPU: NORMAL
BLD PROD TYP BPU: NORMAL
BLOOD UNIT EXPIRATION DATE: NORMAL
BLOOD UNIT EXPIRATION DATE: NORMAL
BLOOD UNIT TYPE CODE: 6200
BLOOD UNIT TYPE CODE: 6200
BLOOD UNIT TYPE: NORMAL
BLOOD UNIT TYPE: NORMAL
BUN SERPL-MCNC: 31 MG/DL
CALCIUM SERPL-MCNC: 7.9 MG/DL
CHLORIDE SERPL-SCNC: 105 MMOL/L
CO2 SERPL-SCNC: 23 MMOL/L
CODING SYSTEM: NORMAL
CODING SYSTEM: NORMAL
CREAT SERPL-MCNC: 3.2 MG/DL
DIFFERENTIAL METHOD: ABNORMAL
DISPENSE STATUS: NORMAL
DISPENSE STATUS: NORMAL
EOSINOPHIL # BLD AUTO: 0.1 K/UL
EOSINOPHIL NFR BLD: 1.7 %
ERYTHROCYTE [DISTWIDTH] IN BLOOD BY AUTOMATED COUNT: 16.8 %
EST. GFR  (AFRICAN AMERICAN): 22.8 ML/MIN/1.73 M^2
EST. GFR  (NON AFRICAN AMERICAN): 19.7 ML/MIN/1.73 M^2
GLUCOSE SERPL-MCNC: 141 MG/DL
HCT VFR BLD AUTO: 22.1 %
HGB BLD-MCNC: 7.3 G/DL
IMM GRANULOCYTES # BLD AUTO: 0.05 K/UL
IMM GRANULOCYTES NFR BLD AUTO: 1.2 %
LYMPHOCYTES # BLD AUTO: 0.5 K/UL
LYMPHOCYTES NFR BLD: 11.6 %
MAGNESIUM SERPL-MCNC: 1.8 MG/DL
MCH RBC QN AUTO: 32.4 PG
MCHC RBC AUTO-ENTMCNC: 33 G/DL
MCV RBC AUTO: 98 FL
MONOCYTES # BLD AUTO: 0.5 K/UL
MONOCYTES NFR BLD: 12.8 %
NEUTROPHILS # BLD AUTO: 3 K/UL
NEUTROPHILS NFR BLD: 72.2 %
NRBC BLD-RTO: 0 /100 WBC
PHOSPHATE SERPL-MCNC: 3.9 MG/DL
PLATELET # BLD AUTO: 53 K/UL
PMV BLD AUTO: 10.9 FL
POCT GLUCOSE: 148 MG/DL (ref 70–110)
POCT GLUCOSE: 179 MG/DL (ref 70–110)
POCT GLUCOSE: 219 MG/DL (ref 70–110)
POCT GLUCOSE: 227 MG/DL (ref 70–110)
POTASSIUM SERPL-SCNC: 3.9 MMOL/L
PROT SERPL-MCNC: 4.2 G/DL
RBC # BLD AUTO: 2.25 M/UL
SODIUM SERPL-SCNC: 135 MMOL/L
TACROLIMUS BLD-MCNC: 6.3 NG/ML
TRANS ERYTHROCYTES VOL PATIENT: NORMAL ML
TRANS ERYTHROCYTES VOL PATIENT: NORMAL ML
WBC # BLD AUTO: 4.14 K/UL

## 2018-08-05 PROCEDURE — 63600175 PHARM REV CODE 636 W HCPCS: Performed by: SURGERY

## 2018-08-05 PROCEDURE — 97110 THERAPEUTIC EXERCISES: CPT

## 2018-08-05 PROCEDURE — P9021 RED BLOOD CELLS UNIT: HCPCS

## 2018-08-05 PROCEDURE — 80053 COMPREHEN METABOLIC PANEL: CPT

## 2018-08-05 PROCEDURE — 99233 SBSQ HOSP IP/OBS HIGH 50: CPT | Mod: 24,,, | Performed by: NURSE PRACTITIONER

## 2018-08-05 PROCEDURE — 25000003 PHARM REV CODE 250: Performed by: STUDENT IN AN ORGANIZED HEALTH CARE EDUCATION/TRAINING PROGRAM

## 2018-08-05 PROCEDURE — 85025 COMPLETE CBC W/AUTO DIFF WBC: CPT

## 2018-08-05 PROCEDURE — 94761 N-INVAS EAR/PLS OXIMETRY MLT: CPT

## 2018-08-05 PROCEDURE — 84100 ASSAY OF PHOSPHORUS: CPT

## 2018-08-05 PROCEDURE — 99900035 HC TECH TIME PER 15 MIN (STAT)

## 2018-08-05 PROCEDURE — 25000003 PHARM REV CODE 250: Performed by: NURSE PRACTITIONER

## 2018-08-05 PROCEDURE — 86901 BLOOD TYPING SEROLOGIC RH(D): CPT

## 2018-08-05 PROCEDURE — 97530 THERAPEUTIC ACTIVITIES: CPT

## 2018-08-05 PROCEDURE — 86920 COMPATIBILITY TEST SPIN: CPT

## 2018-08-05 PROCEDURE — 94664 DEMO&/EVAL PT USE INHALER: CPT

## 2018-08-05 PROCEDURE — 63600175 PHARM REV CODE 636 W HCPCS: Performed by: STUDENT IN AN ORGANIZED HEALTH CARE EDUCATION/TRAINING PROGRAM

## 2018-08-05 PROCEDURE — 25000003 PHARM REV CODE 250: Performed by: SURGERY

## 2018-08-05 PROCEDURE — 80197 ASSAY OF TACROLIMUS: CPT

## 2018-08-05 PROCEDURE — 27000646 HC AEROBIKA DEVICE

## 2018-08-05 PROCEDURE — 99231 SBSQ HOSP IP/OBS SF/LOW 25: CPT | Mod: ,,, | Performed by: NURSE PRACTITIONER

## 2018-08-05 PROCEDURE — 20600001 HC STEP DOWN PRIVATE ROOM

## 2018-08-05 PROCEDURE — 63600175 PHARM REV CODE 636 W HCPCS: Performed by: NURSE PRACTITIONER

## 2018-08-05 PROCEDURE — 83735 ASSAY OF MAGNESIUM: CPT

## 2018-08-05 RX ORDER — FUROSEMIDE 10 MG/ML
100 INJECTION INTRAMUSCULAR; INTRAVENOUS ONCE
Status: COMPLETED | OUTPATIENT
Start: 2018-08-05 | End: 2018-08-05

## 2018-08-05 RX ORDER — FUROSEMIDE 10 MG/ML
40 INJECTION INTRAMUSCULAR; INTRAVENOUS ONCE
Status: DISCONTINUED | OUTPATIENT
Start: 2018-08-05 | End: 2018-08-05

## 2018-08-05 RX ORDER — HYDROCODONE BITARTRATE AND ACETAMINOPHEN 500; 5 MG/1; MG/1
TABLET ORAL
Status: DISCONTINUED | OUTPATIENT
Start: 2018-08-05 | End: 2018-08-06

## 2018-08-05 RX ADMIN — ATORVASTATIN CALCIUM 40 MG: 20 TABLET, FILM COATED ORAL at 08:08

## 2018-08-05 RX ADMIN — MYCOPHENOLATE MOFETIL 1000 MG: 250 CAPSULE ORAL at 08:08

## 2018-08-05 RX ADMIN — LEVETIRACETAM 500 MG: 500 TABLET ORAL at 08:08

## 2018-08-05 RX ADMIN — HEPARIN SODIUM 5000 UNITS: 5000 INJECTION, SOLUTION INTRAVENOUS; SUBCUTANEOUS at 08:08

## 2018-08-05 RX ADMIN — INSULIN ASPART 2 UNITS: 100 INJECTION, SOLUTION INTRAVENOUS; SUBCUTANEOUS at 11:08

## 2018-08-05 RX ADMIN — SULFAMETHOXAZOLE AND TRIMETHOPRIM 1 TABLET: 400; 80 TABLET ORAL at 08:08

## 2018-08-05 RX ADMIN — OXYCODONE HYDROCHLORIDE 5 MG: 5 TABLET ORAL at 08:08

## 2018-08-05 RX ADMIN — FLUCONAZOLE 200 MG: 200 TABLET ORAL at 08:08

## 2018-08-05 RX ADMIN — CEFTRIAXONE SODIUM 2 G: 2 INJECTION, POWDER, FOR SOLUTION INTRAMUSCULAR; INTRAVENOUS at 05:08

## 2018-08-05 RX ADMIN — DOCUSATE SODIUM 100 MG: 100 CAPSULE, LIQUID FILLED ORAL at 08:08

## 2018-08-05 RX ADMIN — TACROLIMUS 4 MG: 1 CAPSULE ORAL at 05:08

## 2018-08-05 RX ADMIN — PANTOPRAZOLE SODIUM 40 MG: 40 TABLET, DELAYED RELEASE ORAL at 08:08

## 2018-08-05 RX ADMIN — FUROSEMIDE 100 MG: 10 INJECTION, SOLUTION INTRAMUSCULAR; INTRAVENOUS at 03:08

## 2018-08-05 RX ADMIN — HEPARIN SODIUM 5000 UNITS: 5000 INJECTION, SOLUTION INTRAVENOUS; SUBCUTANEOUS at 05:08

## 2018-08-05 RX ADMIN — PREDNISONE 15 MG: 5 TABLET ORAL at 08:08

## 2018-08-05 RX ADMIN — ACYCLOVIR 400 MG: 200 CAPSULE ORAL at 08:08

## 2018-08-05 RX ADMIN — HEPARIN SODIUM 5000 UNITS: 5000 INJECTION, SOLUTION INTRAVENOUS; SUBCUTANEOUS at 03:08

## 2018-08-05 RX ADMIN — TACROLIMUS 4 MG: 1 CAPSULE ORAL at 08:08

## 2018-08-05 RX ADMIN — MICONAZOLE NITRATE: 20 OINTMENT TOPICAL at 08:08

## 2018-08-05 RX ADMIN — INSULIN ASPART 2 UNITS: 100 INJECTION, SOLUTION INTRAVENOUS; SUBCUTANEOUS at 08:08

## 2018-08-05 RX ADMIN — INSULIN ASPART 2 UNITS: 100 INJECTION, SOLUTION INTRAVENOUS; SUBCUTANEOUS at 05:08

## 2018-08-05 RX ADMIN — INSULIN ASPART 1 UNITS: 100 INJECTION, SOLUTION INTRAVENOUS; SUBCUTANEOUS at 09:08

## 2018-08-05 NOTE — ASSESSMENT & PLAN NOTE
- H&H 6.6/20.3 8/1: Transfused 1 unit PRBCs  - H/H 6.4/19.7: Transfused 1 u PRBC  - Epo started on 8/3  - H&H slightly lower this am and will plan for one unit of PRBCs for replacement.

## 2018-08-05 NOTE — ASSESSMENT & PLAN NOTE
Avoid insulin stacking and hypoglycemia.  Lab Results   Component Value Date    CREATININE 3.2 (H) 08/05/2018

## 2018-08-05 NOTE — ASSESSMENT & PLAN NOTE
- With CKD3.  - Expected VICKY post txp.  - UOP unclear as pt unable to measure urine (severe scrotal edema).  - Tolerated HD 8/1 and 8/3.   - plan for lasix again today for diuresis.   - Nephrology following and HD per their recs.

## 2018-08-05 NOTE — SUBJECTIVE & OBJECTIVE
Scheduled Meds:   acyclovir  400 mg Oral BID    atorvastatin  40 mg Oral Daily    cefTRIAXone (ROCEPHIN) IVPB  2 g Intravenous Q24H    docusate sodium  100 mg Oral Daily    ergocalciferol  50,000 Units Oral Q7 Days    fluconazole  200 mg Oral Daily    furosemide  100 mg Intravenous Once    heparin (porcine)  5,000 Units Subcutaneous Q8H    insulin aspart U-100  2 Units Subcutaneous TIDWM    levETIRAcetam  500 mg Oral BID    miconazole nitrate 2%   Topical (Top) BID    mycophenolate  1,000 mg Oral BID    pantoprazole  40 mg Oral BID    predniSONE  15 mg Oral Daily    sulfamethoxazole-trimethoprim 400-80mg  1 tablet Oral Daily    tacrolimus  4 mg Oral BID    triamcinolone acetonide 0.1%   Topical (Top) BID     Continuous Infusions:  PRN Meds:sodium chloride, sodium chloride, sodium chloride, sodium chloride, sodium chloride 0.9%, dextrose 50%, dextrose 50%, gelatin adsorbable 100cm top sponge, glucagon (human recombinant), glucose, glucose, insulin aspart U-100, ondansetron, oxyCODONE, oxyCODONE, sodium chloride 0.9%    Review of Systems   Constitutional: Positive for activity change, appetite change and fatigue. Negative for chills, diaphoresis and fever.   HENT: Negative for congestion and facial swelling.    Eyes: Negative for pain, discharge and visual disturbance.   Respiratory: Positive for cough. Negative for chest tightness, shortness of breath and wheezing.    Cardiovascular: Positive for leg swelling. Negative for chest pain and palpitations.   Gastrointestinal: Positive for abdominal distention. Negative for abdominal pain, constipation, diarrhea, nausea and vomiting.   Endocrine: Negative.    Genitourinary: Positive for decreased urine volume.   Musculoskeletal: Negative for back pain.   Skin: Positive for wound.   Allergic/Immunologic: Positive for immunocompromised state.   Neurological: Positive for weakness. Negative for dizziness, tremors and headaches.   Hematological: Negative.     Psychiatric/Behavioral: Negative for agitation, dysphoric mood and sleep disturbance. The patient is not nervous/anxious.      Objective:     Vital Signs (Most Recent):  Temp: 97.9 °F (36.6 °C) (08/05/18 1143)  Pulse: 60 (08/05/18 1143)  Resp: 20 (08/05/18 1143)  BP: (!) 145/67 (08/05/18 1143)  SpO2: 98 % (08/05/18 1143) Vital Signs (24h Range):  Temp:  [97.7 °F (36.5 °C)-98.4 °F (36.9 °C)] 97.9 °F (36.6 °C)  Pulse:  [54-65] 60  Resp:  [16-20] 20  SpO2:  [95 %-98 %] 98 %  BP: (118-145)/(56-67) 145/67     Weight: 127 kg (279 lb 14.4 oz)  Body mass index is 35.94 kg/m².    Intake/Output - Last 3 Shifts       08/03 0700 - 08/04 0659 08/04 0700 - 08/05 0659 08/05 0700 - 08/06 0659    P.O. 900 570 720    Blood 272.5      IV Piggyback 50 50     Total Intake(mL/kg) 1222.5 (9.8) 620 (4.9) 720 (5.7)    Other 1600      Total Output 1600        Net -377.5 +620 +720           Urine Occurrence 4 x 13 x 2 x    Stool Occurrence 3 x 2 x 1 x    Emesis Occurrence  0 x 0 x          Physical Exam   Constitutional: He is oriented to person, place, and time. He appears well-developed. No distress.   Temporal and distal extremity muscle wasting   HENT:   Head: Normocephalic and atraumatic.   Eyes: EOM are normal. Pupils are equal, round, and reactive to light. No scleral icterus.   Cardiovascular: Normal rate, regular rhythm and normal heart sounds.    Pulmonary/Chest: Effort normal.   Coarse BS   Abdominal: Soft. Bowel sounds are normal. He exhibits distension (ascites). There is no tenderness.   - [employer] cdi no ssi   Musculoskeletal: He exhibits edema (3+ BLE).   Neurological: He is alert and oriented to person, place, and time.   Skin: Skin is warm and dry. He is not diaphoretic.   Psychiatric: He has a normal mood and affect. His behavior is normal. Judgment and thought content normal.   Nursing note and vitals reviewed.      Laboratory:  Immunosuppressants         Stop Route Frequency     tacrolimus capsule 4 mg      -- Oral 2  times daily     mycophenolate capsule 1,000 mg      -- Oral 2 times daily        CBC:   Recent Labs  Lab 08/05/18  0337   WBC 4.14   RBC 2.25*   HGB 7.3*   HCT 22.1*   PLT 53*   MCV 98   MCH 32.4*   MCHC 33.0     CMP:   Recent Labs  Lab 08/05/18  0337   *   CALCIUM 7.9*   ALBUMIN 2.9*   PROT 4.2*   *   K 3.9   CO2 23      BUN 31*   CREATININE 3.2*   ALKPHOS 66   ALT 22   AST 13   BILITOT 0.5     Coagulation:   Recent Labs  Lab 07/31/18  0500   INR 1.2   APTT 28.3     Labs within the past 24 hours have been reviewed.    Diagnostic Results:  None

## 2018-08-05 NOTE — PROGRESS NOTES
"[facility]  Endocrinology  Progress Note    Admit Date: 2018     Reason for Consult: Management of hypothermia and followed by consult on steroid induced hyperglycemia     Diabetes diagnosis year: none      HPI:   Patient is a 63 y.o. 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 Hospital course was complicated by hepatic encephalopathy. During the course, he has also had few episodes of hypothermia with temp as low as 93 F. He completed 7 day course of vancomycin for cellilitis, and is currently on vancomycin and cefepime for possible sepsis. However he continues to be hypothermic.     Further work-up of hypothermia was done, which showed TSH: 2.714, FT4: 0.53, AM cortisol: 9.9.    Interval HPI:   Overnight events: remains in TSU. BG at goal with scheduled novolog; not requiring correction scale. Prednisone 15 mg daily.   Eatin% - 75%  Nausea: No  Hypoglycemia and intervention: No  Fever: No  TPN and/or TF: No    BP (!) 125/59 (BP Location: Right arm, Patient Position: Lying)   Pulse (!) 59   Temp 97.7 °F (36.5 °C) (Oral)   Resp 16   Ht 6' 2" (1.88 m)   Wt 127 kg (279 lb 14.4 oz)   SpO2 96%   BMI 35.94 kg/m²       Labs Reviewed and Include      Recent Labs  Lab 18  0337   *   CALCIUM 7.9*   ALBUMIN 2.9*   PROT 4.2*   *   K 3.9   CO2 23      BUN 31*   CREATININE 3.2*   ALKPHOS 66   ALT 22   AST 13   BILITOT 0.5     Lab Results   Component Value Date    WBC 4.14 2018    HGB 7.3 (L) 2018    HCT 22.1 (L) 2018    MCV 98 2018    PLT 53 (L) 2018     No results for input(s): TSH, FREET4 in the last 168 hours.  Lab Results   Component " Value Date    HGBA1C 5.2 07/22/2018       Nutritional status:   Body mass index is 35.94 kg/m².  Lab Results   Component Value Date    ALBUMIN 2.9 (L) 08/05/2018    ALBUMIN 2.5 (L) 08/04/2018    ALBUMIN 2.5 (L) 08/03/2018     Lab Results   Component Value Date    PREALBUMIN 4 (L) 06/30/2018    PREALBUMIN 5 (L) 01/22/2018    PREALBUMIN 7 (L) 08/31/2016       Estimated Creatinine Clearance: 33.9 mL/min (A) (based on SCr of 3.2 mg/dL (H)).    Accu-Checks  Recent Labs      08/02/18   1712  08/02/18   2045  08/03/18   0847  08/03/18   1300  08/03/18   1933  08/04/18   0806  08/04/18   1320  08/04/18   1715  08/04/18   2136  08/05/18   0735   POCTGLUCOSE  186*  169*  131*  96  133*  88  139*  147*  182*  148*       Current Medications and/or Treatments Impacting Glycemic Control  Immunotherapy:  Immunosuppressants         Stop Route Frequency     tacrolimus capsule 4 mg      -- Oral 2 times daily     mycophenolate capsule 1,000 mg      -- Oral 2 times daily        Steroids:   Hormones     Start     Stop Route Frequency Ordered    08/05/18 0900  predniSONE tablet 15 mg  (methylprednisolone taper panel)      -- Oral Daily 08/04/18 0850        Pressors:    Autonomic Drugs     None        Hyperglycemia/Diabetes Medications: Antihyperglycemics     Start     Stop Route Frequency Ordered    08/04/18 0900  insulin aspart U-100 pen 2 Units      -- SubQ 3 times daily with meals 08/04/18 0859    07/27/18 1805  insulin aspart U-100 pen 0-5 Units      -- SubQ Before meals & nightly PRN 07/27/18 1705          ASSESSMENT and PLAN    * Liver transplanted    S/p transplanted.  Currently on scheduled steroid taper.  Per transplant  Avoid hypoglycemia.         Steroid-induced hyperglycemia    No previous history of DM  BG goal 140-180.     Continue novolog 2 units with meals.  BG monitoring ac/hs and low dose correction scale.     Discharge:  TBD.          Coronary artery disease involving native coronary artery of native heart without  angina pectoris      Avoid hypoglycemia        Long-term use of immunosuppressant medication    May increase insulin resistance.             Stage II pressure ulcer of sacral region    Optimize BG.           CKD (chronic kidney disease), stage III    Avoid insulin stacking and hypoglycemia.  Lab Results   Component Value Date    CREATININE 3.2 (H) 08/05/2018               Acute kidney injury superimposed on chronic kidney disease      Avoid insulin stacking            Alma Snyder NP  Endocrinology  [facility]

## 2018-08-05 NOTE — PLAN OF CARE
Problem: Patient Care Overview  Goal: Plan of Care Review  Outcome: Ongoing (interventions implemented as appropriate)  Fall precautions maintained. No falls or injuries noted thus far. Weight being shifted as needed w/ 2-person assistance. PT at bedside today. Patient c/o R shoulder pain today, warm packs given and Oxycodone given x 1 today. Relief noted. - c/o nausea/vomiting. Zofran on PRN MAR if needed. H/H down today, 7.3/23.3. 1 unit of PRBCs given today. 100 mg IV lasix given post blood transfusion. Afebrile, last does of rocephin due today. Patient w/ urinary incontinence secondary to scrotal edema. Incontinence pads changed. Cream placed to sacrum w/ incontinence care. Patient with 1 BM today via bedpan. CBG being monitored before meals, ranging 148-179. Endocrinology following patient. Telemetry continued, SB on the monitor. Spouse at bedside and attentive to patient, pulling self medications w/ 100% accuracy. Personal items in close reach.

## 2018-08-05 NOTE — PLAN OF CARE
Problem: Patient Care Overview  Goal: Plan of Care Review  Outcome: Ongoing (interventions implemented as appropriate)  - Pt is POD #13 following liver transplantation for alcoholic cirrhosis.  - Pt doing well overnight. Pt denies pain; pt denies nausea. VS WNL other than bradycardic HR at times (high 40s/low 50s while asleep) - baseline for this pt.  - Pt is incontinent of both urine and stool. Blue pads changed underneath pt as needed. Pt has severe scrotal edema. Miconazole barrier cream applied to pt per order. Pt turned with wedge.  - Pt has venous stasis ulcers to his lower legs. Wound care nurses performing scheduled dressing changes (legs are wrapped). Pt asking if the wrappings can be released temporarily from time to time as they are causing him some minor discomfort; will attempt to clarify whether this is acceptable.  - Pt has family at bedside; involved and attentive to pt.  - Dressing change performed on midline catheter overnight.  - Pt looking forward to getting out of bed with aid of cardiac chair; the unit's cardiac chair is broken... will attempt to determine timeframe for repair or whether PT can bring one for pt.  - Pt undergoes HD as needed per nephrology recommendations.

## 2018-08-05 NOTE — ASSESSMENT & PLAN NOTE
No previous history of DM  BG goal 140-180.     Continue novolog 2 units with meals.  BG monitoring ac/hs and low dose correction scale.     Discharge:  TBD.

## 2018-08-05 NOTE — PT/OT/SLP PROGRESS
"Physical Therapy Treatment    Patient Name:  Alon Adamson   MRN:  [id number]    Recommendations:     Discharge Recommendations:  nursing facility, skilled   Discharge Equipment Recommendations:  (TBD pending progress)   Barriers to discharge: Inaccessible home and Decreased caregiver support    Assessment:     Alon Adamson is a 63 y.o. male admitted with a medical diagnosis of Liver transplanted.  He presents with the following impairments/functional limitations:  weakness, impaired endurance, impaired self care skills, impaired functional mobilty, impaired balance, decreased lower extremity function, edema, decreased ROM, decreased safety awareness, impaired skin, decreased upper extremity function . Patient initially required encouragement to participate, but showed improved initiative and motivation as treatment progressed. Moderate B LE swelling still noted, which limits B LE ROM.     Rehab Prognosis:  fair; patient would benefit from acute skilled PT services to address these deficits and reach maximum level of function.      Recent Surgery: Procedure(s) (LRB):  TRANSPLANT, LIVER (N/A) 13 Days Post-Op    Plan:     During this hospitalization, patient to be seen 4 x/week to address the above listed problems via gait training, therapeutic activities, therapeutic exercises, neuromuscular re-education  · Plan of Care Expires:  08/26/18   Plan of Care Reviewed with: patient, spouse, son, mother    Subjective     Communicated with nsg prior to session.  Patient found with HOB elevated upon PT entry to room, agreeable to treatment.   Patient states " I feel a lot better today than on other days, but my testicles are still swollen and it hurts when I'm sitting".     Chief Complaint: weakness and swelling  Patient comments/goals: to get stronger and to be able to walk again.  Pain/Comfort:  · Pain Rating 1: 0/10  · Location - Orientation 1: generalized  · Location 1: scrotum  · Pain Addressed 1: Reposition, " Distraction, Cessation of Activity  · Pain Rating Post-Intervention 1: 3/10    Patients cultural, spiritual, Holiness conflicts given the current situation: None stated    Objective:     Patient found with: central line, telemetry     General Precautions: Standard, fall   Orthopedic Precautions:N/A   Braces: N/A     Functional Mobility:  · Bed Mobility:     · Rolling Right: minimum assistance  · Scooting: moderate assistance  · Supine to Sit: minimum assistance  · Sit to Supine: minimum assistance  · Balance: fair to good in sitting.      AM-PAC 6 CLICK MOBILITY  Turning over in bed (including adjusting bedclothes, sheets and blankets)?: 2  Sitting down on and standing up from a chair with arms (e.g., wheelchair, bedside commode, etc.): 2  Moving from lying on back to sitting on the side of the bed?: 3  Moving to and from a bed to a chair (including a wheelchair)?: 1  Need to walk in hospital room?: 1  Climbing 3-5 steps with a railing?: 1  Basic Mobility Total Score: 10       Therapeutic Activities and Exercises:   Static sitting balance at EOB x 20 minutes with SBA. There ex in sitting: LAQ, HIP FLEX, HIP ABD AND AP 2X12 REPS B LE.     Patient left HOB elevated with all lines intact, call button in reach and FAMILY present..    GOALS:    Physical Therapy Goals        Problem: Physical Therapy Goal    Goal Priority Disciplines Outcome Goal Variances Interventions   Physical Therapy Goal     PT/OT, PT Ongoing (interventions implemented as appropriate)     Description:  Goals to be met by: 2018    Patient will increase functional independence with mobility by performin. Supine to sit with moderate assistance -  met  2. Sit to stand with moderate assistance - not met  3. Gait x 50ft with moderate assistance with RW. - not met  4. Ascend/descend 5 stair with right Handrails with moderate assistance. - not met  5. Lower extremity exercise program x 20 reps, with supervision, in order to increase LE strength  and (I) with functional mobility.                                 Time Tracking:     PT Received On: 08/05/18  PT Start Time: 1034     PT Stop Time: 1101  PT Total Time (min): 27 min     Billable Minutes: Therapeutic Activity 15 and Therapeutic Exercise 12    Treatment Type: Treatment  PT/PTA: PTA     PTA Visit Number: 1     Dylon Ferreira, PTA  08/05/2018

## 2018-08-05 NOTE — PLAN OF CARE
Problem: Physical Therapy Goal  Goal: Physical Therapy Goal  Goals to be met by: 2018    Patient will increase functional independence with mobility by performin. Supine to sit with moderate assistance -  met  2. Sit to stand with moderate assistance - not met  3. Gait x 50ft with moderate assistance with RW. - not met  4. Ascend/descend 5 stair with right Handrails with moderate assistance. - not met  5. Lower extremity exercise program x 20 reps, with supervision, in order to increase LE strength and (I) with functional mobility.               Outcome: Ongoing (interventions implemented as appropriate)  Improved bed mobility noted.

## 2018-08-05 NOTE — SUBJECTIVE & OBJECTIVE
"Interval HPI:   Overnight events: remains in TSU. BG at goal with scheduled novolog; not requiring correction scale. Prednisone 15 mg daily.   Eatin% - 75%  Nausea: No  Hypoglycemia and intervention: No  Fever: No  TPN and/or TF: No    BP (!) 125/59 (BP Location: Right arm, Patient Position: Lying)   Pulse (!) 59   Temp 97.7 °F (36.5 °C) (Oral)   Resp 16   Ht 6' 2" (1.88 m)   Wt 127 kg (279 lb 14.4 oz)   SpO2 96%   BMI 35.94 kg/m²     Labs Reviewed and Include      Recent Labs  Lab 18  0337   *   CALCIUM 7.9*   ALBUMIN 2.9*   PROT 4.2*   *   K 3.9   CO2 23      BUN 31*   CREATININE 3.2*   ALKPHOS 66   ALT 22   AST 13   BILITOT 0.5     Lab Results   Component Value Date    WBC 4.14 2018    HGB 7.3 (L) 2018    HCT 22.1 (L) 2018    MCV 98 2018    PLT 53 (L) 2018     No results for input(s): TSH, FREET4 in the last 168 hours.  Lab Results   Component Value Date    HGBA1C 5.2 2018       Nutritional status:   Body mass index is 35.94 kg/m².  Lab Results   Component Value Date    ALBUMIN 2.9 (L) 2018    ALBUMIN 2.5 (L) 2018    ALBUMIN 2.5 (L) 2018     Lab Results   Component Value Date    PREALBUMIN 4 (L) 2018    PREALBUMIN 5 (L) 2018    PREALBUMIN 7 (L) 2016       Estimated Creatinine Clearance: 33.9 mL/min (A) (based on SCr of 3.2 mg/dL (H)).    Accu-Checks  Recent Labs      18   1712  18   2045  18   0847  18   1300  18   1933  18   0806  18   1320  18   1715  18   2136  18   0735   POCTGLUCOSE  186*  169*  131*  96  133*  88  139*  147*  182*  148*       Current Medications and/or Treatments Impacting Glycemic Control  Immunotherapy:  Immunosuppressants         Stop Route Frequency     tacrolimus capsule 4 mg      -- Oral 2 times daily     mycophenolate capsule 1,000 mg      -- Oral 2 times daily        Steroids:   Hormones     Start     Stop Route " Frequency Ordered    08/05/18 0900  predniSONE tablet 15 mg  (methylprednisolone taper panel)      -- Oral Daily 08/04/18 0850        Pressors:    Autonomic Drugs     None        Hyperglycemia/Diabetes Medications: Antihyperglycemics     Start     Stop Route Frequency Ordered    08/04/18 0900  insulin aspart U-100 pen 2 Units      -- SubQ 3 times daily with meals 08/04/18 0859    07/27/18 1805  insulin aspart U-100 pen 0-5 Units      -- SubQ Before meals & nightly PRN 07/27/18 1705

## 2018-08-05 NOTE — PROGRESS NOTES
[facility]  Liver Transplant  Progress Note    Patient Name: Alon Adamson  MRN: [id number]  Admission Date: 2018  Hospital Length of Stay: 44 days  Code Status: Full Code  Primary Care Provider: Mckinley Vides MD  Post-Operative Day: 13    ORGAN:   LIVER  Disease Etiology: Alcoholic Cirrhosis  Donor Type:    - Brain Death  CDC High Risk:   No  Donor CMV Status:   Donor CMV Status: Negative  Donor HBcAB:   Negative  Donor HCV Status:   Negative  Donor HBV TAMAR: Negative  Donor HCV TAMAR: Negative  Whole or Partial: Whole Liver  Biliary Anastomosis: End to End  Arterial Anatomy: Standard  Subjective:     History of Present Illness:  Mr. 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  Complications of liver disease include hyperbilirubinemia, hypoalbuminemia, severe malnutrition,   hepatic encephalopathy, thrombocytopenia, splenomegaly, ascites, hypervolemia, coagulopathy, portal HTN.  Pt admitted  for acutely worsening hepatic encephalopathy and concern for BLE cellulitis as well as severe volume overload.  Complications of ELSD managed while inpt and he received liver offer on 18.  Pt underwent liver transplant without complication: steroid induction, DBD, CMV -/-.  Donor with E coli bacteremia resistant to cipro placed on Rocephin x 2 weeks per ID recs.  Also on fluconazole x 30 days as pt very ill prior to transplant. Post transplant, with nice trend down of AST/ALT and LFTs.  Liver U/S with elevated RIs and decreased HAV.  During initial post operative period, pt required pressor support.  Pt with expected post operative VICKY as with VICKY prior to surgery.  Nephrology consulted and pt placed on intermittent CRRT.  Pressor requirement decreased and pt tolerated CRRT without pressors.  Pt with asymptomatic bradycardia while in ICU prompting cardiology consult. Pt without need for acute intervention but atropine placed at bedside.  HR has improved POD  7 and 8.  He was transferred to the TSU 7/30 PM.          Hospital Course:  Interval history: no acute events overnight. Pt feeling well this am and no episodes of n/v noted with breakfast. Tolerated HD 8/1 and 8/3. Liver US 7/31 showed small fluid collection likely an adrenal hemorrhage vs hematoma. H&H decreased to 6.6/20.3 8/1- responded to 1 u PRBCs. H/H stable 8/2 AM. Dropped again 8/3 AM- 1 u PRBC given. H&H now stable. Iron studies checked and pt started on epo.  No active bleed.  Liver U/S repeated and negative for sign of bleed, results satisfactory.  H&H slightly lower again today and will replace with one unit of PRBCs. Pt continues to have difficulty with urinating due to scrotal edema. Lasix today for diuresis. Continue PT/OT and encourage supplements for malnutrition.     Scheduled Meds:   acyclovir  400 mg Oral BID    atorvastatin  40 mg Oral Daily    cefTRIAXone (ROCEPHIN) IVPB  2 g Intravenous Q24H    docusate sodium  100 mg Oral Daily    ergocalciferol  50,000 Units Oral Q7 Days    fluconazole  200 mg Oral Daily    furosemide  100 mg Intravenous Once    heparin (porcine)  5,000 Units Subcutaneous Q8H    insulin aspart U-100  2 Units Subcutaneous TIDWM    levETIRAcetam  500 mg Oral BID    miconazole nitrate 2%   Topical (Top) BID    mycophenolate  1,000 mg Oral BID    pantoprazole  40 mg Oral BID    predniSONE  15 mg Oral Daily    sulfamethoxazole-trimethoprim 400-80mg  1 tablet Oral Daily    tacrolimus  4 mg Oral BID    triamcinolone acetonide 0.1%   Topical (Top) BID     Continuous Infusions:  PRN Meds:sodium chloride, sodium chloride, sodium chloride, sodium chloride, sodium chloride 0.9%, dextrose 50%, dextrose 50%, gelatin adsorbable 100cm top sponge, glucagon (human recombinant), glucose, glucose, insulin aspart U-100, ondansetron, oxyCODONE, oxyCODONE, sodium chloride 0.9%    Review of Systems   Constitutional: Positive for activity change, appetite change and fatigue.  Negative for chills, diaphoresis and fever.   HENT: Negative for congestion and facial swelling.    Eyes: Negative for pain, discharge and visual disturbance.   Respiratory: Positive for cough. Negative for chest tightness, shortness of breath and wheezing.    Cardiovascular: Positive for leg swelling. Negative for chest pain and palpitations.   Gastrointestinal: Positive for abdominal distention. Negative for abdominal pain, constipation, diarrhea, nausea and vomiting.   Endocrine: Negative.    Genitourinary: Positive for decreased urine volume.   Musculoskeletal: Negative for back pain.   Skin: Positive for wound.   Allergic/Immunologic: Positive for immunocompromised state.   Neurological: Positive for weakness. Negative for dizziness, tremors and headaches.   Hematological: Negative.    Psychiatric/Behavioral: Negative for agitation, dysphoric mood and sleep disturbance. The patient is not nervous/anxious.      Objective:     Vital Signs (Most Recent):  Temp: 97.9 °F (36.6 °C) (08/05/18 1143)  Pulse: 60 (08/05/18 1143)  Resp: 20 (08/05/18 1143)  BP: (!) 145/67 (08/05/18 1143)  SpO2: 98 % (08/05/18 1143) Vital Signs (24h Range):  Temp:  [97.7 °F (36.5 °C)-98.4 °F (36.9 °C)] 97.9 °F (36.6 °C)  Pulse:  [54-65] 60  Resp:  [16-20] 20  SpO2:  [95 %-98 %] 98 %  BP: (118-145)/(56-67) 145/67     Weight: 127 kg (279 lb 14.4 oz)  Body mass index is 35.94 kg/m².    Intake/Output - Last 3 Shifts       08/03 0700 - 08/04 0659 08/04 0700 - 08/05 0659 08/05 0700 - 08/06 0659    P.O. 900 570 720    Blood 272.5      IV Piggyback 50 50     Total Intake(mL/kg) 1222.5 (9.8) 620 (4.9) 720 (5.7)    Other 1600      Total Output 1600        Net -377.5 +620 +720           Urine Occurrence 4 x 13 x 2 x    Stool Occurrence 3 x 2 x 1 x    Emesis Occurrence  0 x 0 x          Physical Exam   Constitutional: He is oriented to person, place, and time. He appears well-developed. No distress.   Temporal and distal extremity muscle wasting   HENT:    Head: Normocephalic and atraumatic.   Eyes: EOM are normal. Pupils are equal, round, and reactive to light. No scleral icterus.   Cardiovascular: Normal rate, regular rhythm and normal heart sounds.    Pulmonary/Chest: Effort normal.   Coarse BS   Abdominal: Soft. Bowel sounds are normal. He exhibits distension (ascites). There is no tenderness.   - [employer] cdi no ssi   Musculoskeletal: He exhibits edema (3+ BLE).   Neurological: He is alert and oriented to person, place, and time.   Skin: Skin is warm and dry. He is not diaphoretic.   Psychiatric: He has a normal mood and affect. His behavior is normal. Judgment and thought content normal.   Nursing note and vitals reviewed.      Laboratory:  Immunosuppressants         Stop Route Frequency     tacrolimus capsule 4 mg      -- Oral 2 times daily     mycophenolate capsule 1,000 mg      -- Oral 2 times daily        CBC:   Recent Labs  Lab 08/05/18  0337   WBC 4.14   RBC 2.25*   HGB 7.3*   HCT 22.1*   PLT 53*   MCV 98   MCH 32.4*   MCHC 33.0     CMP:   Recent Labs  Lab 08/05/18  0337   *   CALCIUM 7.9*   ALBUMIN 2.9*   PROT 4.2*   *   K 3.9   CO2 23      BUN 31*   CREATININE 3.2*   ALKPHOS 66   ALT 22   AST 13   BILITOT 0.5     Coagulation:   Recent Labs  Lab 07/31/18  0500   INR 1.2   APTT 28.3     Labs within the past 24 hours have been reviewed.    Diagnostic Results:  None    Assessment/Plan:     * Liver transplanted    - LFTs/enzymes normalized  - POD 1 U/s with elevated RIs  - Repeat liver U/S with improvement  - progressing well post-op.         Long-term use of immunosuppressant medication    - see prophylactic immuno.           Prophylactic immunotherapy    - continue prograf, cellcept and prednisone. Monitor labs daily and adjust dose accordingly for a therapeutic level.           At risk for opportunistic infections    - Continue Bactrim, acyclovir, fluconazole          Sinus bradycardia    - Improved  - Atropine at bedside if HR  decreases again          Positive blood culture in cadaveric donor    - E coli bacteremia in donor resistant to Cipro  - Rocephin x 14 days --> stop date 8/6        Steroid-induced hyperglycemia    - Endocrine consulted          CKD (chronic kidney disease), stage III    - See VICKY.          Stage II pressure ulcer of sacral region    - Wound care following          Severe protein-calorie malnutrition    - Dietary consulted.  - Continue supplements.  - Pt eating well.        Physical deconditioning    - PT/OT NEEDED 5-6 DAYS Q WEEK  - PT IS SEVERELY DECONDITIONED         Pancytopenia    - Related to decompensated liver disease --> should improve now that pt transplanted.        Acute kidney injury superimposed on chronic kidney disease    - With CKD3.  - Expected VICKY post txp.  - UOP unclear as pt unable to measure urine (severe scrotal edema).  - Tolerated HD 8/1 and 8/3.   - plan for lasix again today for diuresis.   - Nephrology following and HD per their recs.         Decompensated hepatic cirrhosis    - Placed status 7 on 6/26 for prescription drug coverage change. Re-activated 7/3. Back on hold d/t frailty and decompensation at the end of last week prompting ICU transfer. Now off hold 7/17.  Plan to submit MELD exception points. Will remeld today at 27.       MELD-Na score: 27 at 7/21/2018  4:01 AM  MELD score: 27 at 7/21/2018  4:01 AM  Calculated from:  Serum Creatinine: 3.7 mg/dL at 7/21/2018  4:00 AM  Serum Sodium: 147 mmol/L (Rounded to 137) at 7/21/2018  4:00 AM  Total Bilirubin: 1.6 mg/dL at 7/21/2018  4:00 AM  INR(ratio): 1.7 at 7/21/2018  4:01 AM  Age: 63 years        Coronary artery disease involving native coronary artery of native heart without angina pectoris    - Continue home ASA.   - 2D Echo 7/9 normal EF (55-60%), trivial tricuspid/mitral regurg.        Anemia of chronic disease    - H&H 6.6/20.3 8/1: Transfused 1 unit PRBCs  - H/H 6.4/19.7: Transfused 1 u PRBC  - Epo started on 8/3  - H&H  slightly lower this am and will plan for one unit of PRBCs for replacement.         Thrombocytopenia    - Secondary to splenomegaly from portal HTN- should improve with txp.  - No s/s overt bleed.        Seizures    - Continue oral Keppra.  - Keep Mg > 2.        Bilateral edema of lower extremity    - Continue intermittent dialysis.   - Tolerated HD well 8/1, again 8/3  - Wrapped by wound care with significant improvement        Hepatic encephalopathy    - ESLD secondary to EtOH cirrhosis with severe complications (hyperbilirubinemia, hypoalbuminemia, severe malnutrition, hepatic encephalopathy, thrombocytopenia, splenomegaly, ascites, hypervolemia, coaguloopathy, portal HTN), seizure disorder on Keppra, CKD3, CAD   - has frequent episodes of lethargy/confusion/slowing when holding lactulose   - admitted to SICU 7/13/18 for worsening encephalopathy and hypothermia  - PO lactulose TID, PRN lactulose enemas, Rifaximin  - sleepy this AM. Given lactulose enema with improvement in mentation. Continue oral lactulose and PRN lactulose enemas for HE control.               VTE Risk Mitigation         Ordered     heparin (porcine) injection 5,000 Units  Every 8 hours      07/26/18 1330     Place sequential compression device  Until discontinued      07/23/18 1326     IP VTE HIGH RISK PATIENT  Once      07/23/18 1242          The patients clinical status was discussed at multidisplinary rounds, involving transplant surgery, transplant medicine, pharmacy, nursing, nutrition, and social work    Discharge Planning:  Not a candidate for d/c at this time.     Jonathan Lopez NP  Liver Transplant  [facility]

## 2018-08-06 PROBLEM — K72.90 DECOMPENSATED HEPATIC CIRRHOSIS: Status: RESOLVED | Noted: 2018-01-21 | Resolved: 2018-08-06

## 2018-08-06 PROBLEM — K74.60 DECOMPENSATED HEPATIC CIRRHOSIS: Status: RESOLVED | Noted: 2018-01-21 | Resolved: 2018-08-06

## 2018-08-06 LAB
ALBUMIN SERPL BCP-MCNC: 2.6 G/DL
ALP SERPL-CCNC: 61 U/L
ALT SERPL W/O P-5'-P-CCNC: 23 U/L
ANION GAP SERPL CALC-SCNC: 8 MMOL/L
AST SERPL-CCNC: 11 U/L
BASOPHILS # BLD AUTO: 0.02 K/UL
BASOPHILS NFR BLD: 0.4 %
BILIRUB SERPL-MCNC: 0.5 MG/DL
BUN SERPL-MCNC: 36 MG/DL
CALCIUM SERPL-MCNC: 8 MG/DL
CHLORIDE SERPL-SCNC: 105 MMOL/L
CO2 SERPL-SCNC: 22 MMOL/L
CREAT SERPL-MCNC: 3.4 MG/DL
DIFFERENTIAL METHOD: ABNORMAL
EOSINOPHIL # BLD AUTO: 0 K/UL
EOSINOPHIL NFR BLD: 0.8 %
ERYTHROCYTE [DISTWIDTH] IN BLOOD BY AUTOMATED COUNT: 16.7 %
EST. GFR  (AFRICAN AMERICAN): 21.1 ML/MIN/1.73 M^2
EST. GFR  (NON AFRICAN AMERICAN): 18.3 ML/MIN/1.73 M^2
GLUCOSE SERPL-MCNC: 117 MG/DL
HCT VFR BLD AUTO: 24.2 %
HGB BLD-MCNC: 8.1 G/DL
IMM GRANULOCYTES # BLD AUTO: 0.04 K/UL
IMM GRANULOCYTES NFR BLD AUTO: 0.8 %
LYMPHOCYTES # BLD AUTO: 0.5 K/UL
LYMPHOCYTES NFR BLD: 9.5 %
MAGNESIUM SERPL-MCNC: 1.4 MG/DL
MCH RBC QN AUTO: 32.4 PG
MCHC RBC AUTO-ENTMCNC: 33.5 G/DL
MCV RBC AUTO: 97 FL
MONOCYTES # BLD AUTO: 0.5 K/UL
MONOCYTES NFR BLD: 10.7 %
NEUTROPHILS # BLD AUTO: 3.7 K/UL
NEUTROPHILS NFR BLD: 77.8 %
NRBC BLD-RTO: 0 /100 WBC
PHOSPHATE SERPL-MCNC: 4.3 MG/DL
PLATELET # BLD AUTO: 60 K/UL
PMV BLD AUTO: 10.7 FL
POCT GLUCOSE: 159 MG/DL (ref 70–110)
POCT GLUCOSE: 215 MG/DL (ref 70–110)
POCT GLUCOSE: 231 MG/DL (ref 70–110)
POCT GLUCOSE: 88 MG/DL (ref 70–110)
POTASSIUM SERPL-SCNC: 4.2 MMOL/L
PROT SERPL-MCNC: 4.1 G/DL
RBC # BLD AUTO: 2.5 M/UL
SODIUM SERPL-SCNC: 135 MMOL/L
TACROLIMUS BLD-MCNC: 7.9 NG/ML
WBC # BLD AUTO: 4.75 K/UL

## 2018-08-06 PROCEDURE — 97535 SELF CARE MNGMENT TRAINING: CPT

## 2018-08-06 PROCEDURE — 80053 COMPREHEN METABOLIC PANEL: CPT

## 2018-08-06 PROCEDURE — 83735 ASSAY OF MAGNESIUM: CPT

## 2018-08-06 PROCEDURE — 63600175 PHARM REV CODE 636 W HCPCS: Performed by: SURGERY

## 2018-08-06 PROCEDURE — 25000003 PHARM REV CODE 250: Performed by: SURGERY

## 2018-08-06 PROCEDURE — 85025 COMPLETE CBC W/AUTO DIFF WBC: CPT

## 2018-08-06 PROCEDURE — 25000003 PHARM REV CODE 250: Performed by: STUDENT IN AN ORGANIZED HEALTH CARE EDUCATION/TRAINING PROGRAM

## 2018-08-06 PROCEDURE — 97110 THERAPEUTIC EXERCISES: CPT

## 2018-08-06 PROCEDURE — 63600175 PHARM REV CODE 636 W HCPCS: Performed by: STUDENT IN AN ORGANIZED HEALTH CARE EDUCATION/TRAINING PROGRAM

## 2018-08-06 PROCEDURE — 80197 ASSAY OF TACROLIMUS: CPT

## 2018-08-06 PROCEDURE — 97530 THERAPEUTIC ACTIVITIES: CPT

## 2018-08-06 PROCEDURE — 63600175 PHARM REV CODE 636 W HCPCS: Performed by: NURSE PRACTITIONER

## 2018-08-06 PROCEDURE — 20600001 HC STEP DOWN PRIVATE ROOM

## 2018-08-06 PROCEDURE — 25000003 PHARM REV CODE 250: Performed by: PHYSICIAN ASSISTANT

## 2018-08-06 PROCEDURE — 84100 ASSAY OF PHOSPHORUS: CPT

## 2018-08-06 PROCEDURE — 97112 NEUROMUSCULAR REEDUCATION: CPT

## 2018-08-06 PROCEDURE — 99231 SBSQ HOSP IP/OBS SF/LOW 25: CPT | Mod: ,,, | Performed by: NURSE PRACTITIONER

## 2018-08-06 PROCEDURE — 25000003 PHARM REV CODE 250: Performed by: NURSE PRACTITIONER

## 2018-08-06 PROCEDURE — 99233 SBSQ HOSP IP/OBS HIGH 50: CPT | Mod: 24,,, | Performed by: PHYSICIAN ASSISTANT

## 2018-08-06 PROCEDURE — 99232 SBSQ HOSP IP/OBS MODERATE 35: CPT | Mod: GC,,, | Performed by: INTERNAL MEDICINE

## 2018-08-06 RX ORDER — SULFAMETHOXAZOLE AND TRIMETHOPRIM 400; 80 MG/1; MG/1
1 TABLET ORAL
Status: DISCONTINUED | OUTPATIENT
Start: 2018-08-06 | End: 2018-08-12

## 2018-08-06 RX ADMIN — LEVETIRACETAM 500 MG: 500 TABLET ORAL at 09:08

## 2018-08-06 RX ADMIN — SULFAMETHOXAZOLE AND TRIMETHOPRIM 1 TABLET: 400; 80 TABLET ORAL at 05:08

## 2018-08-06 RX ADMIN — ATORVASTATIN CALCIUM 40 MG: 20 TABLET, FILM COATED ORAL at 09:08

## 2018-08-06 RX ADMIN — MYCOPHENOLATE MOFETIL 1000 MG: 250 CAPSULE ORAL at 09:08

## 2018-08-06 RX ADMIN — INSULIN ASPART 2 UNITS: 100 INJECTION, SOLUTION INTRAVENOUS; SUBCUTANEOUS at 05:08

## 2018-08-06 RX ADMIN — PANTOPRAZOLE SODIUM 40 MG: 40 TABLET, DELAYED RELEASE ORAL at 09:08

## 2018-08-06 RX ADMIN — TRIAMCINOLONE ACETONIDE: 1 CREAM TOPICAL at 09:08

## 2018-08-06 RX ADMIN — MICONAZOLE NITRATE: 20 OINTMENT TOPICAL at 09:08

## 2018-08-06 RX ADMIN — FLUCONAZOLE 200 MG: 200 TABLET ORAL at 09:08

## 2018-08-06 RX ADMIN — TACROLIMUS 4 MG: 1 CAPSULE ORAL at 05:08

## 2018-08-06 RX ADMIN — OXYCODONE HYDROCHLORIDE 10 MG: 5 TABLET ORAL at 09:08

## 2018-08-06 RX ADMIN — HEPARIN SODIUM 5000 UNITS: 5000 INJECTION, SOLUTION INTRAVENOUS; SUBCUTANEOUS at 06:08

## 2018-08-06 RX ADMIN — ACYCLOVIR 400 MG: 200 CAPSULE ORAL at 09:08

## 2018-08-06 RX ADMIN — OXYCODONE HYDROCHLORIDE 5 MG: 5 TABLET ORAL at 04:08

## 2018-08-06 RX ADMIN — MICONAZOLE NITRATE: 20 OINTMENT TOPICAL at 07:08

## 2018-08-06 RX ADMIN — INSULIN ASPART 2 UNITS: 100 INJECTION, SOLUTION INTRAVENOUS; SUBCUTANEOUS at 09:08

## 2018-08-06 RX ADMIN — INSULIN ASPART 2 UNITS: 100 INJECTION, SOLUTION INTRAVENOUS; SUBCUTANEOUS at 01:08

## 2018-08-06 RX ADMIN — PREDNISONE 15 MG: 5 TABLET ORAL at 09:08

## 2018-08-06 RX ADMIN — TACROLIMUS 4 MG: 1 CAPSULE ORAL at 07:08

## 2018-08-06 RX ADMIN — HEPARIN SODIUM 5000 UNITS: 5000 INJECTION, SOLUTION INTRAVENOUS; SUBCUTANEOUS at 02:08

## 2018-08-06 RX ADMIN — INSULIN ASPART 1 UNITS: 100 INJECTION, SOLUTION INTRAVENOUS; SUBCUTANEOUS at 09:08

## 2018-08-06 RX ADMIN — DOCUSATE SODIUM 100 MG: 100 CAPSULE, LIQUID FILLED ORAL at 09:08

## 2018-08-06 RX ADMIN — HEPARIN SODIUM 5000 UNITS: 5000 INJECTION, SOLUTION INTRAVENOUS; SUBCUTANEOUS at 09:08

## 2018-08-06 NOTE — PROGRESS NOTES
[facility]  Nephrology  Progress Note    Patient Name: Alon Adamson  MRN: [id number]  Admission Date: 6/22/2018  Hospital Length of Stay: 45 days  Attending Provider: Jair Sheridan MD   Primary Care Physician: Mckinley Vides MD  Principal Problem:Liver transplanted    Subjective:     HPI: Mr. Adamson is 63 yr old male with decompensated alcoholic cirrhosis, CKD III and CAD admitted here on 06/22/18 admitted for bilateral LE hypervolemia and VICKY on CKD III with cr of 2.3 baseline around 1.5. Patient has multiple hospitalization in the past secondary to decompensated liver failure. Patient is currently on transplant team. During current admission patient was getting lasix and albumin intermittently for VICKY however renal function was not getting better. Patient hospital course complicated by hepatic encephalopathy with some improvement of mentation with lactulose. Also treated for cellulitis with 7 days of vancomycin.Nephrology is consulted for worsening/not improving VICKY despite lasix/albumin.     Per chart review patient has no hypotensive episodes, BP mostly above 100's. Has not received nephrotoxins such as NSAIDs/contrast media. No sever sepsis/septic shock or acute blood loss during current admit. UA with 2+ leukocytes and Hyaline casts, no wbc/rbc cast or proteinuria. Urine Na+ < 20.    Patient was transferred to ICU on 7/13/2018 after being more lethargic and hypoactive.  After two days was stepped down back to [facility].       Interval History: 11 unmeasure urinations, +/- 100-150ccs a piece. sCr 3.2 --> 3.4    Review of patient's allergies indicates:   Allergen Reactions    Nsaids (non-steroidal anti-inflammatory drug)      D/t to liver disease.    Tylenol [acetaminophen]      D/t liver disease.    Penicillins Other (See Comments)     Tolerated pip-tazo in 8/2016 without issue  Tolerated cefepime 7/2018 without issue  Since childhood was told not to take it     Current  Facility-Administered Medications   Medication Frequency    0.9%  NaCl infusion PRN    acyclovir capsule 400 mg BID    atorvastatin tablet 40 mg Daily    dextrose 50% injection 12.5 g PRN    dextrose 50% injection 25 g PRN    docusate sodium capsule 100 mg Daily    ergocalciferol capsule 50,000 Units Q7 Days    fluconazole tablet 200 mg Daily    gelatin adsorbable 100cm top sponge 100 sponge 1 applicator PRN    glucagon (human recombinant) injection 1 mg PRN    glucose chewable tablet 16 g PRN    glucose chewable tablet 24 g PRN    heparin (porcine) injection 5,000 Units Q8H    insulin aspart U-100 pen 0-5 Units QID (AC + HS) PRN    insulin aspart U-100 pen 2 Units TIDWM    levETIRAcetam tablet 500 mg BID    miconazole nitrate 2% ointment BID    mycophenolate capsule 1,000 mg BID    ondansetron injection 4 mg Q8H PRN    oxyCODONE immediate release tablet 10 mg Q6H PRN    oxyCODONE immediate release tablet 5 mg Q6H PRN    pantoprazole EC tablet 40 mg BID    predniSONE tablet 15 mg Daily    sodium chloride 0.9% flush 3 mL PRN    sulfamethoxazole-trimethoprim 400-80mg per tablet 1 tablet Daily    sulfamethoxazole-trimethoprim 400-80mg per tablet 1 tablet Every Mon, Wed, Fri    tacrolimus capsule 4 mg BID    triamcinolone acetonide 0.1% cream BID       Objective:     Vital Signs (Most Recent):  Temp: 97.6 °F (36.4 °C) (08/06/18 1627)  Pulse: 71 (08/06/18 1627)  Resp: 18 (08/06/18 1627)  BP: (!) 148/67 (08/06/18 1627)  SpO2: 97 % (08/06/18 1627)  O2 Device (Oxygen Therapy): room air (08/06/18 1627) Vital Signs (24h Range):  Temp:  [97.6 °F (36.4 °C)-98.6 °F (37 °C)] 97.6 °F (36.4 °C)  Pulse:  [] 71  Resp:  [16-20] 18  SpO2:  [95 %-98 %] 97 %  BP: (119-148)/(60-67) 148/67     Weight: 127.1 kg (280 lb 3.2 oz) (08/06/18 0435)  Body mass index is 35.98 kg/m².  Body surface area is 2.58 meters squared.    I/O last 3 completed shifts:  In: 2019 [P.O.:1730; Blood:239; IV Piggyback:50]  Out: -      Physical Exam   HENT:   Head: Atraumatic.   Neck: No JVD present.   Cardiovascular: Normal rate and regular rhythm.  Exam reveals no friction rub.    Pulmonary/Chest: Effort normal. He has rales (mild and mostly at bases).   Abdominal: Soft. He exhibits no distension.   Musculoskeletal: He exhibits edema.   Neurological: He is alert.   Skin: Skin is warm.   Psychiatric: He has a normal mood and affect.         Assessment/Plan:     CKD (chronic kidney disease), stage III    No with acute component and requiring dialysis (see VICKY section)        Acute kidney injury superimposed on chronic kidney disease    Started on RRT, today with increasing sCr, although acid/base and lytes are fine, he is still significantly volume overloaded    - continues to make urine and output may be increasing, although not adequately recorded (goes on the pad in the bed), also appears he is starting to provide clearance on his own and as evidence by the limited rise in sCr of only 0.2 overnight     - hold on RRT, appears patient may be recovering  - strict Is & Os and serial RFPs  - Avoid nephrotoxic medications  - Hb > 7gm/dL            Thank you for your consult. I will follow-up with patient. Please contact us if you have any additional questions.    Elan Magana MD  Nephrology  [facility]    ATTENDING PHYSICIAN ATTESTATION  I have personally interviewed and examined the patient. I thoroughly reviewed the demographic, clinical, laboratorial and imaging information available in medical records. I agree with the assessment and recommendations provided by the subspecialty resident. Dr. Magana was under my supervision.

## 2018-08-06 NOTE — PROGRESS NOTES
"[facility]  Endocrinology  Progress Note    Admit Date: 2018     Reason for Consult: Management of hypothermia and followed by consult on steroid induced hyperglycemia     Diabetes diagnosis year: none      HPI:   Patient is a 63 y.o. 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 Hospital course was complicated by hepatic encephalopathy. During the course, he has also had few episodes of hypothermia with temp as low as 93 F. He completed 7 day course of vancomycin for cellilitis, and is currently on vancomycin and cefepime for possible sepsis. However he continues to be hypothermic.     Further work-up of hypothermia was done, which showed TSH: 2.714, FT4: 0.53, AM cortisol: 9.9.    Interval HPI:   Overnight events: remains in TSU. BG above goal overnight but below goal this AM. Prandial excursion noted. Prednisone 15 mg daily; standard steroid taper.   Eatin% -100% improving   Nausea: No  Hypoglycemia and intervention: No  Fever: No  TPN and/or TF: No    BP (!) 141/65 (BP Location: Left arm, Patient Position: Lying)   Pulse 60   Temp 98 °F (36.7 °C) (Oral)   Resp 16   Ht 6' 2" (1.88 m)   Wt 127.1 kg (280 lb 3.2 oz)   SpO2 97%   BMI 35.98 kg/m²       Labs Reviewed and Include      Recent Labs  Lab 18  0344   *   CALCIUM 8.0*   ALBUMIN 2.6*   PROT 4.1*   *   K 4.2   CO2 22*      BUN 36*   CREATININE 3.4*   ALKPHOS 61   ALT 23   AST 11   BILITOT 0.5     Lab Results   Component Value Date    WBC 4.75 2018    HGB 8.1 (L) 2018    HCT 24.2 (L) 2018    MCV 97 2018    PLT 60 (L) 2018     No results for input(s): TSH, FREET4 in the last " 168 hours.  Lab Results   Component Value Date    HGBA1C 5.2 07/22/2018       Nutritional status:   Body mass index is 35.98 kg/m².  Lab Results   Component Value Date    ALBUMIN 2.6 (L) 08/06/2018    ALBUMIN 2.9 (L) 08/05/2018    ALBUMIN 2.5 (L) 08/04/2018     Lab Results   Component Value Date    PREALBUMIN 4 (L) 06/30/2018    PREALBUMIN 5 (L) 01/22/2018    PREALBUMIN 7 (L) 08/31/2016       Estimated Creatinine Clearance: 31.9 mL/min (A) (based on SCr of 3.4 mg/dL (H)).    Accu-Checks  Recent Labs      08/03/18   1933  08/04/18   0806  08/04/18   1320  08/04/18   1715  08/04/18   2136  08/05/18   0735  08/05/18   1144  08/05/18   1701  08/05/18   2107  08/06/18   0757   POCTGLUCOSE  133*  88  139*  147*  182*  148*  179*  219*  227*  88       Current Medications and/or Treatments Impacting Glycemic Control  Immunotherapy:  Immunosuppressants         Stop Route Frequency     tacrolimus capsule 4 mg      -- Oral 2 times daily     mycophenolate capsule 1,000 mg      -- Oral 2 times daily        Steroids:   Hormones     Start     Stop Route Frequency Ordered    08/05/18 0900  predniSONE tablet 15 mg  (methylprednisolone taper panel)      -- Oral Daily 08/04/18 0850        Pressors:    Autonomic Drugs     None        Hyperglycemia/Diabetes Medications: Antihyperglycemics     Start     Stop Route Frequency Ordered    08/04/18 0900  insulin aspart U-100 pen 2 Units      -- SubQ 3 times daily with meals 08/04/18 0859    07/27/18 1805  insulin aspart U-100 pen 0-5 Units      -- SubQ Before meals & nightly PRN 07/27/18 1705          ASSESSMENT and PLAN    * Liver transplanted    S/p transplanted.  Currently on scheduled steroid taper.  Per transplant  Avoid hypoglycemia.         Steroid-induced hyperglycemia    No previous history of DM  BG goal 140-180.     Continue novolog 2 units with meals.  Will monitor for excursions with lunch bg; no changes currently due to BG below goal this AM.   BG monitoring ac/hs and low dose  correction scale.     Discharge:  TBD.          Long-term use of immunosuppressant medication    May increase insulin resistance.             Stage II pressure ulcer of sacral region    Optimize BG.           CKD (chronic kidney disease), stage III    Avoid insulin stacking and hypoglycemia.  Lab Results   Component Value Date    CREATININE 3.4 (H) 08/06/2018               Acute kidney injury superimposed on chronic kidney disease      Avoid insulin stacking            Alma Snyder NP  Endocrinology  [facility]

## 2018-08-06 NOTE — PLAN OF CARE
Problem: Physical Therapy Goal  Goal: Physical Therapy Goal  Goals to be met by: 2018    Patient will increase functional independence with mobility by performin. Supine to sit with moderate assistance -  met  2. Sit to stand with moderate assistance - met (2018)  3. Gait x 50ft with minimal assistance with RW. - not met  4. Pt will be able to perform hip flexion, hip abduction, and heel raises while standing with use of rolling walker (denoting improvement in balance)- Min A. Not met               Outcome: Ongoing (interventions implemented as appropriate)  Goals revised.

## 2018-08-06 NOTE — PROGRESS NOTES
[facility]  Liver Transplant  Progress Note    Patient Name: Alon Adamson  MRN: [id number]  Admission Date: 2018  Hospital Length of Stay: 45 days  Code Status: Full Code  Primary Care Provider: Mckinley Vides MD  Post-Operative Day: 14    ORGAN:   LIVER  Disease Etiology: Alcoholic Cirrhosis  Donor Type:    - Brain Death  CDC High Risk:   No  Donor CMV Status:   Donor CMV Status: Negative  Donor HBcAB:   Negative  Donor HCV Status:   Negative  Donor HBV TAMAR: Negative  Donor HCV TAMAR: Negative  Whole or Partial: Whole Liver  Biliary Anastomosis: End to End  Arterial Anatomy: Standard  Subjective:     History of Present Illness:  Mr. 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  Complications of liver disease include hyperbilirubinemia, hypoalbuminemia, severe malnutrition,   hepatic encephalopathy, thrombocytopenia, splenomegaly, ascites, hypervolemia, coagulopathy, portal HTN.  Pt admitted  for acutely worsening hepatic encephalopathy and concern for BLE cellulitis as well as severe volume overload.  Complications of ELSD managed while inpt and he received liver offer on 18.  Pt underwent liver transplant without complication: steroid induction, DBD, CMV -/-.  Donor with E coli bacteremia resistant to cipro placed on Rocephin x 2 weeks per ID recs.  Also on fluconazole x 30 days as pt very ill prior to transplant. Post transplant, with nice trend down of AST/ALT and LFTs.  Liver U/S with elevated RIs and decreased HAV.  During initial post operative period, pt required pressor support.  Pt with expected post operative VICKY as with VICKY prior to surgery.  Nephrology consulted and pt placed on intermittent CRRT.  Pressor requirement decreased and pt tolerated CRRT without pressors.  Pt with asymptomatic bradycardia while in ICU prompting cardiology consult. Pt without need for acute intervention but atropine placed at bedside.  HR has improved POD  7 and 8.  He was transferred to the TSU 7/30 PM.          Liver US 7/31 showed small fluid collection likely an adrenal hemorrhage vs hematoma. H&H decreased to 6.6/20.3 8/1- responded to 1 u PRBCs. H/H stable 8/2 AM. Dropped again 8/3 AM- 1 u PRBC given. H&H now stable. Iron studies checked and pt started on epo.     Hospital Course:  Interval history: No acute events overnight. Pt feeling well this am and no episodes of n/v noted with breakfast. Tolerated HD 8/1 and 8/3.  Pt given lasix over with weekend and UOP improved.  Delta Cr also now improving.  Continue to monitor closely for renal recovery.  H/H stable today.  Pt sat in chair today and worked well with PT.  Continue to monitor for renal recovery.  Continue to monitor progress with PT/OT.    Scheduled Meds:   acyclovir  400 mg Oral BID    atorvastatin  40 mg Oral Daily    docusate sodium  100 mg Oral Daily    ergocalciferol  50,000 Units Oral Q7 Days    fluconazole  200 mg Oral Daily    heparin (porcine)  5,000 Units Subcutaneous Q8H    insulin aspart U-100  2 Units Subcutaneous TIDWM    levETIRAcetam  500 mg Oral BID    miconazole nitrate 2%   Topical (Top) BID    mycophenolate  1,000 mg Oral BID    pantoprazole  40 mg Oral BID    predniSONE  15 mg Oral Daily    sulfamethoxazole-trimethoprim 400-80mg  1 tablet Oral Daily    sulfamethoxazole-trimethoprim 400-80mg  1 tablet Oral Every Mon, Wed, Fri    tacrolimus  4 mg Oral BID    triamcinolone acetonide 0.1%   Topical (Top) BID     Continuous Infusions:  PRN Meds:sodium chloride 0.9%, dextrose 50%, dextrose 50%, gelatin adsorbable 100cm top sponge, glucagon (human recombinant), glucose, glucose, insulin aspart U-100, ondansetron, oxyCODONE, oxyCODONE, sodium chloride 0.9%    Review of Systems   Constitutional: Positive for activity change, appetite change and fatigue. Negative for chills, diaphoresis and fever.   HENT: Negative for congestion and facial swelling.    Eyes: Negative for pain,  discharge and visual disturbance.   Respiratory: Positive for cough. Negative for chest tightness, shortness of breath and wheezing.    Cardiovascular: Positive for leg swelling. Negative for chest pain and palpitations.   Gastrointestinal: Positive for abdominal distention. Negative for abdominal pain, constipation, diarrhea, nausea and vomiting.   Endocrine: Negative.    Genitourinary: Positive for decreased urine volume.   Musculoskeletal: Negative for back pain.   Skin: Positive for wound.   Allergic/Immunologic: Positive for immunocompromised state.   Neurological: Positive for weakness. Negative for dizziness, tremors and headaches.   Hematological: Negative.    Psychiatric/Behavioral: Negative for agitation, dysphoric mood and sleep disturbance. The patient is not nervous/anxious.      Objective:     Vital Signs (Most Recent):  Temp: 97.6 °F (36.4 °C) (08/06/18 1150)  Pulse: 82 (08/06/18 1502)  Resp: 17 (08/06/18 1150)  BP: 119/61 (08/06/18 1150)  SpO2: 95 % (08/06/18 1150) Vital Signs (24h Range):  Temp:  [97.6 °F (36.4 °C)-98.6 °F (37 °C)] 97.6 °F (36.4 °C)  Pulse:  [] 82  Resp:  [16-20] 17  SpO2:  [95 %-98 %] 95 %  BP: (119-141)/(60-65) 119/61     Weight: 127.1 kg (280 lb 3.2 oz)  Body mass index is 35.98 kg/m².    Intake/Output - Last 3 Shifts       08/04 0700 - 08/05 0659 08/05 0700 - 08/06 0659 08/06 0700 - 08/07 0659    P.O. 570 1480 840    Blood  239     IV Piggyback 50 50     Total Intake(mL/kg) 620 (4.9) 1769 (13.9) 840 (6.6)    Net +620 +1769 +840           Urine Occurrence 13 x 11 x     Stool Occurrence 2 x 1 x 1 x    Emesis Occurrence 0 x 0 x           Physical Exam   Constitutional: He is oriented to person, place, and time. He appears well-developed. No distress.   Temporal and distal extremity muscle wasting   HENT:   Head: Normocephalic and atraumatic.   Eyes: EOM are normal. Pupils are equal, round, and reactive to light. No scleral icterus.   Cardiovascular: Normal rate, regular  rhythm and normal heart sounds.    Pulmonary/Chest: Effort normal.   Coarse BS   Abdominal: Soft. Bowel sounds are normal. He exhibits distension (ascites). There is no tenderness.   - [employer] cdi no ssi   Musculoskeletal: He exhibits edema (3+ BLE).   Neurological: He is alert and oriented to person, place, and time.   Skin: Skin is warm and dry. He is not diaphoretic.   Psychiatric: He has a normal mood and affect. His behavior is normal. Judgment and thought content normal.   Nursing note and vitals reviewed.      Laboratory:  Immunosuppressants         Stop Route Frequency     tacrolimus capsule 4 mg      -- Oral 2 times daily     mycophenolate capsule 1,000 mg      -- Oral 2 times daily        CBC:     Recent Labs  Lab 08/06/18  0344   WBC 4.75   RBC 2.50*   HGB 8.1*   HCT 24.2*   PLT 60*   MCV 97   MCH 32.4*   MCHC 33.5     CMP:     Recent Labs  Lab 08/06/18  0344   *   CALCIUM 8.0*   ALBUMIN 2.6*   PROT 4.1*   *   K 4.2   CO2 22*      BUN 36*   CREATININE 3.4*   ALKPHOS 61   ALT 23   AST 11   BILITOT 0.5     Coagulation:     Recent Labs  Lab 07/31/18  0500   INR 1.2   APTT 28.3     Labs within the past 24 hours have been reviewed.    Diagnostic Results:  None    Assessment/Plan:     * Liver transplanted    - LFTs/enzymes normalized  - POD 1 U/s with elevated RIs  - Repeat liver U/S with improvement          Long-term use of immunosuppressant medication    - see prophylactic immuno.           Prophylactic immunotherapy    - continue prograf, cellcept and prednisone. Monitor labs daily and adjust dose accordingly for a therapeutic level.           At risk for opportunistic infections    - Continue Bactrim, acyclovir, fluconazole          Sinus bradycardia    - Improved  - Atropine at bedside if HR decreases again          Positive blood culture in cadaveric donor    - E coli bacteremia in donor resistant to Cipro  - Rocephin x 14 days --> stop date 8/6        Steroid-induced hyperglycemia     - Endocrine consulted          CKD (chronic kidney disease), stage III    - See VICKY.          Stage II pressure ulcer of sacral region    - Wound care following          Severe protein-calorie malnutrition    - Dietary consulted.  - Continue supplements.  - Pt eating well.        Physical deconditioning    - PT/OT NEEDED 5-6 DAYS Q WEEK  - PT IS SEVERELY DECONDITIONED         Pancytopenia    - Related to decompensated liver disease --> should improve now that pt transplanted.        Acute kidney injury superimposed on chronic kidney disease    - With CKD3.  - Expected VICKY post txp.  - UOP unclear as pt unable to measure urine (severe scrotal edema- improving) though feels UOP improving.  - Tolerated HD 8/1 and 8/3.   - Lasix given over weekend and seems to have helped UOP  - Delta Cr improving today  - Nephrology following and HD per their recs.         Coronary artery disease involving native coronary artery of native heart without angina pectoris    - Continue home ASA.   - 2D Echo 7/9 normal EF (55-60%), trivial tricuspid/mitral regurg.        Anemia of chronic disease    - H&H 6.6/20.3 8/1: Transfused 1 unit PRBCs  - H/H 6.4/19.7: Transfused 1 u PRBC  - Epo started on 8/3  - PRBC 1 unit given 8/5  - H/H improved this AM         Thrombocytopenia    - Secondary to splenomegaly from portal HTN- should improve with txp.  - No s/s overt bleed.        Seizures    - Continue oral Keppra.  - Keep Mg > 2.        Bilateral edema of lower extremity    - Continue intermittent dialysis.   - Tolerated HD well 8/1, again 8/3  - Wrapped by wound care with significant improvement            VTE Risk Mitigation         Ordered     heparin (porcine) injection 5,000 Units  Every 8 hours      07/26/18 1330     Place sequential compression device  Until discontinued      07/23/18 1326     IP VTE HIGH RISK PATIENT  Once      07/23/18 1242          The patients clinical status was discussed at multidisplinary rounds, involving  transplant surgery, transplant medicine, pharmacy, nursing, nutrition, and social work    Discharge Planning:  Will likely need SNF transfer at discharge.    Bebeto Virgen PA-C  Liver Transplant  [facility]

## 2018-08-06 NOTE — SUBJECTIVE & OBJECTIVE
Interval History: 11 unmeasure urinations, +/- 100-150ccs a piece. sCr 3.2 --> 3.4    Review of patient's allergies indicates:   Allergen Reactions    Nsaids (non-steroidal anti-inflammatory drug)      D/t to liver disease.    Tylenol [acetaminophen]      D/t liver disease.    Penicillins Other (See Comments)     Tolerated pip-tazo in 8/2016 without issue  Tolerated cefepime 7/2018 without issue  Since childhood was told not to take it     Current Facility-Administered Medications   Medication Frequency    0.9%  NaCl infusion PRN    acyclovir capsule 400 mg BID    atorvastatin tablet 40 mg Daily    dextrose 50% injection 12.5 g PRN    dextrose 50% injection 25 g PRN    docusate sodium capsule 100 mg Daily    ergocalciferol capsule 50,000 Units Q7 Days    fluconazole tablet 200 mg Daily    gelatin adsorbable 100cm top sponge 100 sponge 1 applicator PRN    glucagon (human recombinant) injection 1 mg PRN    glucose chewable tablet 16 g PRN    glucose chewable tablet 24 g PRN    heparin (porcine) injection 5,000 Units Q8H    insulin aspart U-100 pen 0-5 Units QID (AC + HS) PRN    insulin aspart U-100 pen 2 Units TIDWM    levETIRAcetam tablet 500 mg BID    miconazole nitrate 2% ointment BID    mycophenolate capsule 1,000 mg BID    ondansetron injection 4 mg Q8H PRN    oxyCODONE immediate release tablet 10 mg Q6H PRN    oxyCODONE immediate release tablet 5 mg Q6H PRN    pantoprazole EC tablet 40 mg BID    predniSONE tablet 15 mg Daily    sodium chloride 0.9% flush 3 mL PRN    sulfamethoxazole-trimethoprim 400-80mg per tablet 1 tablet Daily    sulfamethoxazole-trimethoprim 400-80mg per tablet 1 tablet Every Mon, Wed, Fri    tacrolimus capsule 4 mg BID    triamcinolone acetonide 0.1% cream BID       Objective:     Vital Signs (Most Recent):  Temp: 97.6 °F (36.4 °C) (08/06/18 1627)  Pulse: 71 (08/06/18 1627)  Resp: 18 (08/06/18 1627)  BP: (!) 148/67 (08/06/18 1627)  SpO2: 97 % (08/06/18  1627)  O2 Device (Oxygen Therapy): room air (08/06/18 1627) Vital Signs (24h Range):  Temp:  [97.6 °F (36.4 °C)-98.6 °F (37 °C)] 97.6 °F (36.4 °C)  Pulse:  [] 71  Resp:  [16-20] 18  SpO2:  [95 %-98 %] 97 %  BP: (119-148)/(60-67) 148/67     Weight: 127.1 kg (280 lb 3.2 oz) (08/06/18 0435)  Body mass index is 35.98 kg/m².  Body surface area is 2.58 meters squared.    I/O last 3 completed shifts:  In: 2019 [P.O.:1730; Blood:239; IV Piggyback:50]  Out: -     Physical Exam   HENT:   Head: Atraumatic.   Neck: No JVD present.   Cardiovascular: Normal rate and regular rhythm.  Exam reveals no friction rub.    Pulmonary/Chest: Effort normal. He has rales (mild and mostly at bases).   Abdominal: Soft. He exhibits no distension.   Musculoskeletal: He exhibits edema.   Neurological: He is alert.   Skin: Skin is warm.   Psychiatric: He has a normal mood and affect.

## 2018-08-06 NOTE — PLAN OF CARE
Problem: Patient Care Overview  Goal: Plan of Care Review  Outcome: Ongoing (interventions implemented as appropriate)  PT/OT worked with pt today. Pt placed in medi-chair. Tolerating sitting in chair well. Placed outside room to look out window with mother. Dermatitis improving to groins and buttocks.  Miconazole cream applied this am. Afebrile. Immunosuppression given as ordered. Liver numbers WNL. Mother painted incision with Betadine and prepared self-meds correctly this am. Cr=3.4 increased from 3.2. No HD ordered today.  Edema decreasing. Pt still unable to use urinal due to scrotal edema. Scrotal edema decreasing and was able to see and clean penis this am. Pt tolerating po well. Drinking Boost and eating some of meals served. Remains oriented and conversing appropriately. Continuing to monitor.

## 2018-08-06 NOTE — PT/OT/SLP PROGRESS
"Occupational Therapy   Treatment    Name: Alon Adamson  MRN: [id number]  Admitting Diagnosis:  Liver transplanted  14 Days Post-Op    Recommendations:     Discharge Recommendations: nursing facility, skilled  Discharge Equipment Recommendations:   (TBD)  Barriers to discharge:  Inaccessible home environment, Decreased caregiver support    Subjective     Communicated with: RN prior to session.  Pt agreeable to participate with therapy. Mother present during session.   Pain/Comfort:  · Pain Rating 1: 3/10  · Location - Side 1: Bilateral  · Location - Orientation 1: generalized  · Location 1: leg  · Pain Addressed 1: Reposition  · Pain Rating Post-Intervention 1: 3/10    Patients cultural, spiritual, Rastafari conflicts given the current situation: none    Objective:     Patient found with: central line, telemetry    General Precautions: Standard, fall   Orthopedic Precautions:N/A   Braces: N/A     Occupational Performance:    Bed Mobility:    · Patient completed Rolling/Turning to Left with  minimum assistance  · Patient completed Rolling/Turning to Right with minimum assistance  · Patient completed Scooting/Bridging with minimal assistance to be placed under bedpan at bedlevel  · Patient completed Supine to Sit with moderate assistance     Functional Mobility/Transfers:  · Patient completed Sit <> Stand Transfer with minimum assistance-moderate assistance x2 ( Blocking of B knees) with rolling walker   · Patient completed Bed <> Chair Transfer using Stand Pivot technique with minimum assistance x2  with rolling walker    * Pt maintained static standing position ~3 minutes using for support requiring CGA for balance and safety.    · Functional Mobility: Pt completed functional mobility from EOB < cardiac chair with minx2 (5ft) -- Pt easily distracted requiring frequent cues for re-direction; pt knees buckled once reaching cardiac chair pt stating " that has never happened before"       Activities of Daily " Living:  · Upper Body Dressing: moderate assistance to fer gown like jacket while seated EOB ( educated pt on donning RUE first 2/2 limited ROM  · Lower Body Dressing: maximal assistance to fer briefts while seated EOB  · Toileting: total assistance to complete toilet hygiene at bed level     Patient left up in chair with all lines intact, call button in reach and RN notified    [facility] 6 Click:  [facility] Total Score: 12    Treatment & Education:  -Pt edu on OT role/POC  -Importance of OOB activity with staff assistance  -Safety during functional t/f and mobility ( RW management)  - White board updated  - Multiple self care tasks completed-- assistance level noted above  - All questions/concerns answered within OT scope of practice  - Pt performed dynamic sitting activity incorporating unilateral reaching, weight shifting, and core control to increase dynamic balance, strength, and endurance for increased independence with ADL tasks. Pt reached for targets at a varity of heights/distances requiring CGA-SBA for dynamic balance and participated in activity ~3 minutes without rest break.   - First attempted with patient from 10:09- 10:18 ( assisted pt to bed pan using bridging technique, edu patient on skin breakdown prevention, and participating in OOB activity may assist with bowels.)    Education:    Assessment:     Alon Adamson is a 63 y.o. male with a medical diagnosis of Liver transplanted.  He presents alert and motivated to particpate with therapy. Pt demo's improved assistance level with bed/functional mobility and overall activity tolerance.  Performance deficits affecting function are weakness, impaired endurance, gait instability, impaired balance, decreased lower extremity function, impaired self care skills, impaired functional mobilty, decreased ROM, decreased upper extremity function, decreased safety awareness, impaired cognition, pain, edema, impaired skin.  Pt would benefit from SNF following d/c  to continue to progress towards goals and improve quality of life.     Rehab Prognosis:  Good; patient would benefit from acute skilled OT services to address these deficits and reach maximum level of function.       Plan:     Patient to be seen 4 x/week to address the above listed problems via self-care/home management, therapeutic activities, therapeutic exercises, neuromuscular re-education, cognitive retraining  · Plan of Care Expires: 08/26/18  · Plan of Care Reviewed with: patient, mother    This Plan of care has been discussed with the patient who was involved in its development and understands and is in agreement with the identified goals and treatment plan    GOALS:    Occupational Therapy Goals        Problem: Occupational Therapy Goal    Goal Priority Disciplines Outcome Interventions   Occupational Therapy Goal     OT, PT/OT Ongoing (interventions implemented as appropriate)    Description:  Goals to be met by: 8/10/18    Patient will increase functional independence with ADLs by performing:     Upper body dressing while seated EOB with SBA  Grooming while standing at sink with Stand-by Assistance  Toileting from bedside commode with Minimal Assistance for hygiene and clothing management.  Toilet transfer to bedside commode with Contact Guard Assistance.  Pt will complete a functional static standing activity x ~4 minutes with CGA.   LB Dressing with Moderate Assistance                            Time Tracking:     OT Date of Treatment: 08/06/18  OT Start Time: 1046  OT Stop Time: 1131  OT Total Time (min): 45 min   OT/PT co-treat    45+9 =54 ( divided by 2= 27 total minutes)     Billable Minutes:Self Care/Home Management 12  Neuromuscular Re-education 15    Aida buckley OT  8/6/2018

## 2018-08-06 NOTE — ASSESSMENT & PLAN NOTE
- With CKD3.  - Expected VICKY post txp.  - UOP unclear as pt unable to measure urine (severe scrotal edema- improving) though feels UOP improving.  - Tolerated HD 8/1 and 8/3.   - Lasix given over weekend and seems to have helped UOP  - Delta Cr improving today  - Nephrology following and HD per their recs.

## 2018-08-06 NOTE — SUBJECTIVE & OBJECTIVE
Scheduled Meds:   acyclovir  400 mg Oral BID    atorvastatin  40 mg Oral Daily    docusate sodium  100 mg Oral Daily    ergocalciferol  50,000 Units Oral Q7 Days    fluconazole  200 mg Oral Daily    heparin (porcine)  5,000 Units Subcutaneous Q8H    insulin aspart U-100  2 Units Subcutaneous TIDWM    levETIRAcetam  500 mg Oral BID    miconazole nitrate 2%   Topical (Top) BID    mycophenolate  1,000 mg Oral BID    pantoprazole  40 mg Oral BID    predniSONE  15 mg Oral Daily    sulfamethoxazole-trimethoprim 400-80mg  1 tablet Oral Daily    sulfamethoxazole-trimethoprim 400-80mg  1 tablet Oral Every Mon, Wed, Fri    tacrolimus  4 mg Oral BID    triamcinolone acetonide 0.1%   Topical (Top) BID     Continuous Infusions:  PRN Meds:sodium chloride 0.9%, dextrose 50%, dextrose 50%, gelatin adsorbable 100cm top sponge, glucagon (human recombinant), glucose, glucose, insulin aspart U-100, ondansetron, oxyCODONE, oxyCODONE, sodium chloride 0.9%    Review of Systems   Constitutional: Positive for activity change, appetite change and fatigue. Negative for chills, diaphoresis and fever.   HENT: Negative for congestion and facial swelling.    Eyes: Negative for pain, discharge and visual disturbance.   Respiratory: Positive for cough. Negative for chest tightness, shortness of breath and wheezing.    Cardiovascular: Positive for leg swelling. Negative for chest pain and palpitations.   Gastrointestinal: Positive for abdominal distention. Negative for abdominal pain, constipation, diarrhea, nausea and vomiting.   Endocrine: Negative.    Genitourinary: Positive for decreased urine volume.   Musculoskeletal: Negative for back pain.   Skin: Positive for wound.   Allergic/Immunologic: Positive for immunocompromised state.   Neurological: Positive for weakness. Negative for dizziness, tremors and headaches.   Hematological: Negative.    Psychiatric/Behavioral: Negative for agitation, dysphoric mood and sleep disturbance.  The patient is not nervous/anxious.      Objective:     Vital Signs (Most Recent):  Temp: 97.6 °F (36.4 °C) (08/06/18 1150)  Pulse: 82 (08/06/18 1502)  Resp: 17 (08/06/18 1150)  BP: 119/61 (08/06/18 1150)  SpO2: 95 % (08/06/18 1150) Vital Signs (24h Range):  Temp:  [97.6 °F (36.4 °C)-98.6 °F (37 °C)] 97.6 °F (36.4 °C)  Pulse:  [] 82  Resp:  [16-20] 17  SpO2:  [95 %-98 %] 95 %  BP: (119-141)/(60-65) 119/61     Weight: 127.1 kg (280 lb 3.2 oz)  Body mass index is 35.98 kg/m².    Intake/Output - Last 3 Shifts       08/04 0700 - 08/05 0659 08/05 0700 - 08/06 0659 08/06 0700 - 08/07 0659    P.O. 570 1480 840    Blood  239     IV Piggyback 50 50     Total Intake(mL/kg) 620 (4.9) 1769 (13.9) 840 (6.6)    Net +620 +1769 +840           Urine Occurrence 13 x 11 x     Stool Occurrence 2 x 1 x 1 x    Emesis Occurrence 0 x 0 x           Physical Exam   Constitutional: He is oriented to person, place, and time. He appears well-developed. No distress.   Temporal and distal extremity muscle wasting   HENT:   Head: Normocephalic and atraumatic.   Eyes: EOM are normal. Pupils are equal, round, and reactive to light. No scleral icterus.   Cardiovascular: Normal rate, regular rhythm and normal heart sounds.    Pulmonary/Chest: Effort normal.   Coarse BS   Abdominal: Soft. Bowel sounds are normal. He exhibits distension (ascites). There is no tenderness.   - Chevron inc cdi no ssi   Musculoskeletal: He exhibits edema (3+ BLE).   Neurological: He is alert and oriented to person, place, and time.   Skin: Skin is warm and dry. He is not diaphoretic.   Psychiatric: He has a normal mood and affect. His behavior is normal. Judgment and thought content normal.   Nursing note and vitals reviewed.      Laboratory:  Immunosuppressants         Stop Route Frequency     tacrolimus capsule 4 mg      -- Oral 2 times daily     mycophenolate capsule 1,000 mg      -- Oral 2 times daily        CBC:     Recent Labs  Lab 08/06/18  0344   WBC 4.75    RBC 2.50*   HGB 8.1*   HCT 24.2*   PLT 60*   MCV 97   MCH 32.4*   MCHC 33.5     CMP:     Recent Labs  Lab 08/06/18  0344   *   CALCIUM 8.0*   ALBUMIN 2.6*   PROT 4.1*   *   K 4.2   CO2 22*      BUN 36*   CREATININE 3.4*   ALKPHOS 61   ALT 23   AST 11   BILITOT 0.5     Coagulation:     Recent Labs  Lab 07/31/18  0500   INR 1.2   APTT 28.3     Labs within the past 24 hours have been reviewed.    Diagnostic Results:  None

## 2018-08-06 NOTE — PROGRESS NOTES
EDUCATION NOTE:    Met with Alon Renzo Adamson and his caregivers to provide teaching re: immunosuppressant medications.  Reviewed medication section of the Liver Transplant Education book that was provided.  Emphasized the importance of compliance, role of the blue medication card, concerns for drug interactions, and process of obtaining refills.  Counseled regarding Prograf, Cellcept , prednisone, including directions for use, monitoring, how to handle missed doses, and side effects.  Jesse Snow and caregiver verbalized understanding and had the opportunity to ask questions.

## 2018-08-06 NOTE — ASSESSMENT & PLAN NOTE
Started on RRT, today with increasing sCr, although acid/base and lytes are fine, he is still significantly volume overloaded    - continues to make urine and output may be increasing, although not adequately recorded (goes on the pad in the bed), also appears he is starting to provide clearance on his own and as evidence by the limited rise in sCr of only 0.2 overnight     - hold on RRT, appears patient may be recovering  - strict Is & Os and serial RFPs  - Avoid nephrotoxic medications  - Hb > 7gm/dL

## 2018-08-06 NOTE — ASSESSMENT & PLAN NOTE
- H&H 6.6/20.3 8/1: Transfused 1 unit PRBCs  - H/H 6.4/19.7: Transfused 1 u PRBC  - Epo started on 8/3  - PRBC 1 unit given 8/5  - H/H improved this AM

## 2018-08-06 NOTE — ASSESSMENT & PLAN NOTE
No previous history of DM  BG goal 140-180.     Continue novolog 2 units with meals.  Will monitor for excursions with lunch bg; no changes currently due to BG below goal this AM.   BG monitoring ac/hs and low dose correction scale.     Discharge:  TBD.

## 2018-08-06 NOTE — ASSESSMENT & PLAN NOTE
Avoid insulin stacking and hypoglycemia.  Lab Results   Component Value Date    CREATININE 3.4 (H) 08/06/2018

## 2018-08-06 NOTE — PLAN OF CARE
Problem: Occupational Therapy Goal  Goal: Occupational Therapy Goal  Goals to be met by: 8/10/18    Patient will increase functional independence with ADLs by performing:     Upper body dressing while seated EOB with SBA  Grooming while standing at sink with Stand-by Assistance  Toileting from bedside commode with Minimal Assistance for hygiene and clothing management.  Toilet transfer to bedside commode with Contact Guard Assistance.  Pt will complete a functional static standing activity x ~4 minutes with CGA.   LB Dressing with Moderate Assistance           Outcome: Ongoing (interventions implemented as appropriate)  Continue with POC. Pt progressing well with goals.   Aida buckley OT  8/6/2018

## 2018-08-06 NOTE — PROGRESS NOTES
SW met with patient and patient's mother. Patient was observed lying in bed and getting ready to start physical therapy. Patient and pt's mother presented as AAOx4. Pt's mother reported that she was informed that the patient may discharge to a SNF when it is time for him to d/c. Pt's mother is concerned that she will not be able to see her son as often if he discharges to  SNF. She hopes that the patient gains enough strength and not have to d/c to SNF. The patient reported that he feels much better and hopes to make improvements with ambulating.  The patient reported adequate coping with the support of family and denies any current mental health difficulties. SW will continue to follow patient for resources, education, support and dc planning as appropriate. SW remains available.

## 2018-08-06 NOTE — PT/OT/SLP PROGRESS
"Physical Therapy Treatment    Patient Name:  Alon Adamson   MRN:  [id number]    Recommendations:     Discharge Recommendations:  nursing facility, skilled   Discharge Equipment Recommendations:  (TBD)   Barriers to discharge: Decreased caregiver support     CO-TREAT with OT     Assessment:     Alon Adamson is a 63 y.o. male admitted with a medical diagnosis of Liver transplanted.  He presents with the following impairments/functional limitations:  weakness, impaired endurance, impaired self care skills, impaired functional mobilty, edema, pain, gait instability, impaired balance, decreased upper extremity function, decreased lower extremity function, decreased ROM.  Mr. Adamson was able to stand for 3 minutes and perform a stand pivot transfer to the medichair, though his legs did "give out" on him once he was lined up with the chair (preparing to sit). His edema proves to be limiting for his mobility, and he will benefit greatly from further IP skilled therapy via -SNF.    Rehab Prognosis:  good; patient would benefit from acute skilled PT services to address these deficits and reach maximum level of function.      Recent Surgery: Procedure(s) (LRB):  TRANSPLANT, LIVER (N/A) 14 Days Post-Op    Plan:     During this hospitalization, patient to be seen 4 x/week to address the above listed problems via gait training, therapeutic activities, therapeutic exercises, neuromuscular re-education  · Plan of Care Expires:  08/26/18   Plan of Care Reviewed with: patient, mother    Subjective     Communicated with nurse prior to session.  Patient found supine in bed on bed pan upon PT entry to room, agreeable to treatment.      Chief Complaint: Need to have a bowel movement  Patient comments/goals: To get to the medichair today via stand pivot.  Pain/Comfort:  · Pain Rating 1: 3/10  · Location - Side 1: Bilateral  · Location - Orientation 1: generalized  · Location 1: leg  · Pain Addressed 1: Reposition  · Pain " "Rating Post-Intervention 1: 3/10    Patients cultural, spiritual, Restoration conflicts given the current situation: none    Objective:     Patient found with: central line, telemetry     General Precautions: Standard, fall   Orthopedic Precautions:N/A   Braces:       Functional Mobility:  · Bed Mobility:  Rolling Left:  stand by assistance  · Rolling Right: stand by assistance  · Transfers:  Sit to Stand:  minimum - moderate assistance of 2 persons with rolling walker  · Bed to Chair: minimum assistance of 2 persons with rolling walker  using  Stand Pivot  · Gait: 5 feet with use of rolling walker to medichair (from bed), step-to technique, slight forward flexion.  Pt's knees buckled while lined up in front of the medichair. "I don't know what happened."  · Balance: Stood for 3 minutes statically with CGA and use of rolling walker for UE support.      AM-PAC 6 CLICK MOBILITY  Turning over in bed (including adjusting bedclothes, sheets and blankets)?: 3  Sitting down on and standing up from a chair with arms (e.g., wheelchair, bedside commode, etc.): 2  Moving from lying on back to sitting on the side of the bed?: 2  Moving to and from a bed to a chair (including a wheelchair)?: 2  Need to walk in hospital room?: 2  Climbing 3-5 steps with a railing?: 1  Basic Mobility Total Score: 12       Therapeutic Activities and Exercises:  Seated LAQ x 10 rep; was able to bridge up in bed to allow for bed pan underneath him. Required Total A for toileting while side-lying in bed.   Pt will likely be able to get onto a bedside commode in the future, as he was able to stand pivot to the medichair.  Pt was educated on the importance of not remaining on the bed pan for >10 minutes due to the risk of skin break down.    Patient left up in chair with call button in reach and nurse notified..    GOALS:    Physical Therapy Goals        Problem: Physical Therapy Goal    Goal Priority Disciplines Outcome Goal Variances Interventions "   Physical Therapy Goal     PT/OT, PT Ongoing (interventions implemented as appropriate)     Description:  Goals to be met by: 2018    Patient will increase functional independence with mobility by performin. Supine to sit with moderate assistance -  met  2. Sit to stand with moderate assistance - met (2018)  3. Gait x 50ft with minimal assistance with RW. - not met  4. Pt will be able to perform hip flexion, hip abduction, and heel raises while standing with use of rolling walker (denoting improvement in balance)- Min A. Not met                                 Time Tracking:     PT Received On: 18  PT Start Time: 1045     PT Stop Time: 1130  PT Total Time (min): 45 min + 9 minutes= 54 (divided in 2= 27 total)    Billable Minutes: Therapeutic Activity 15 and Therapeutic Exercise 12    Treatment Type: Treatment  PT/PTA: PT     PTA Visit Number: 0     Zayra Craft, PT  2018

## 2018-08-07 PROBLEM — K72.90 DECOMPENSATED HEPATIC CIRRHOSIS: Status: RESOLVED | Noted: 2018-01-21 | Resolved: 2018-08-07

## 2018-08-07 PROBLEM — R78.81 POSITIVE BLOOD CULTURE IN CADAVERIC DONOR: Status: RESOLVED | Noted: 2018-07-24 | Resolved: 2018-08-07

## 2018-08-07 PROBLEM — Z00.5 POSITIVE BLOOD CULTURE IN CADAVERIC DONOR: Status: RESOLVED | Noted: 2018-07-24 | Resolved: 2018-08-07

## 2018-08-07 PROBLEM — K74.60 DECOMPENSATED HEPATIC CIRRHOSIS: Status: RESOLVED | Noted: 2018-01-21 | Resolved: 2018-08-07

## 2018-08-07 LAB
ALBUMIN SERPL BCP-MCNC: 2.5 G/DL
ALP SERPL-CCNC: 62 U/L
ALT SERPL W/O P-5'-P-CCNC: 20 U/L
ANION GAP SERPL CALC-SCNC: 5 MMOL/L
AST SERPL-CCNC: 12 U/L
BASOPHILS # BLD AUTO: 0.04 K/UL
BASOPHILS NFR BLD: 0.9 %
BILIRUB SERPL-MCNC: 0.5 MG/DL
BUN SERPL-MCNC: 41 MG/DL
CALCIUM SERPL-MCNC: 8.2 MG/DL
CHLORIDE SERPL-SCNC: 105 MMOL/L
CO2 SERPL-SCNC: 24 MMOL/L
CREAT SERPL-MCNC: 3.9 MG/DL
DIFFERENTIAL METHOD: ABNORMAL
EOSINOPHIL # BLD AUTO: 0 K/UL
EOSINOPHIL NFR BLD: 0.9 %
ERYTHROCYTE [DISTWIDTH] IN BLOOD BY AUTOMATED COUNT: 17 %
EST. GFR  (AFRICAN AMERICAN): 17.9 ML/MIN/1.73 M^2
EST. GFR  (NON AFRICAN AMERICAN): 15.5 ML/MIN/1.73 M^2
GLUCOSE SERPL-MCNC: 133 MG/DL
HCT VFR BLD AUTO: 24.9 %
HGB BLD-MCNC: 8.1 G/DL
IMM GRANULOCYTES # BLD AUTO: 0.04 K/UL
IMM GRANULOCYTES NFR BLD AUTO: 0.9 %
LYMPHOCYTES # BLD AUTO: 0.5 K/UL
LYMPHOCYTES NFR BLD: 10.2 %
MAGNESIUM SERPL-MCNC: 1.7 MG/DL
MCH RBC QN AUTO: 31.8 PG
MCHC RBC AUTO-ENTMCNC: 32.5 G/DL
MCV RBC AUTO: 98 FL
MONOCYTES # BLD AUTO: 0.5 K/UL
MONOCYTES NFR BLD: 12 %
NEUTROPHILS # BLD AUTO: 3.3 K/UL
NEUTROPHILS NFR BLD: 75.1 %
NRBC BLD-RTO: 0 /100 WBC
PHOSPHATE SERPL-MCNC: 4.4 MG/DL
PLATELET # BLD AUTO: 79 K/UL
PMV BLD AUTO: 10.4 FL
POCT GLUCOSE: 123 MG/DL (ref 70–110)
POCT GLUCOSE: 165 MG/DL (ref 70–110)
POCT GLUCOSE: 210 MG/DL (ref 70–110)
POCT GLUCOSE: 226 MG/DL (ref 70–110)
POTASSIUM SERPL-SCNC: 4.3 MMOL/L
PROT SERPL-MCNC: 4.1 G/DL
RBC # BLD AUTO: 2.55 M/UL
SODIUM SERPL-SCNC: 134 MMOL/L
TACROLIMUS BLD-MCNC: 7 NG/ML
WBC # BLD AUTO: 4.4 K/UL

## 2018-08-07 PROCEDURE — 25000003 PHARM REV CODE 250: Performed by: STUDENT IN AN ORGANIZED HEALTH CARE EDUCATION/TRAINING PROGRAM

## 2018-08-07 PROCEDURE — 63600175 PHARM REV CODE 636 W HCPCS: Mod: JG | Performed by: PHYSICIAN ASSISTANT

## 2018-08-07 PROCEDURE — 63600175 PHARM REV CODE 636 W HCPCS: Performed by: STUDENT IN AN ORGANIZED HEALTH CARE EDUCATION/TRAINING PROGRAM

## 2018-08-07 PROCEDURE — 63600175 PHARM REV CODE 636 W HCPCS: Performed by: NURSE PRACTITIONER

## 2018-08-07 PROCEDURE — 80053 COMPREHEN METABOLIC PANEL: CPT

## 2018-08-07 PROCEDURE — 97530 THERAPEUTIC ACTIVITIES: CPT

## 2018-08-07 PROCEDURE — 25000003 PHARM REV CODE 250: Performed by: NURSE PRACTITIONER

## 2018-08-07 PROCEDURE — 85025 COMPLETE CBC W/AUTO DIFF WBC: CPT

## 2018-08-07 PROCEDURE — 94761 N-INVAS EAR/PLS OXIMETRY MLT: CPT

## 2018-08-07 PROCEDURE — 63600175 PHARM REV CODE 636 W HCPCS: Performed by: SURGERY

## 2018-08-07 PROCEDURE — 99232 SBSQ HOSP IP/OBS MODERATE 35: CPT | Mod: GC,,, | Performed by: INTERNAL MEDICINE

## 2018-08-07 PROCEDURE — 80197 ASSAY OF TACROLIMUS: CPT

## 2018-08-07 PROCEDURE — 84100 ASSAY OF PHOSPHORUS: CPT

## 2018-08-07 PROCEDURE — 83735 ASSAY OF MAGNESIUM: CPT

## 2018-08-07 PROCEDURE — 25000003 PHARM REV CODE 250: Performed by: PHYSICIAN ASSISTANT

## 2018-08-07 PROCEDURE — 94664 DEMO&/EVAL PT USE INHALER: CPT

## 2018-08-07 PROCEDURE — 99900035 HC TECH TIME PER 15 MIN (STAT)

## 2018-08-07 PROCEDURE — 99233 SBSQ HOSP IP/OBS HIGH 50: CPT | Mod: 24,,, | Performed by: PHYSICIAN ASSISTANT

## 2018-08-07 PROCEDURE — 25000003 PHARM REV CODE 250: Performed by: SURGERY

## 2018-08-07 PROCEDURE — 97110 THERAPEUTIC EXERCISES: CPT

## 2018-08-07 PROCEDURE — 20600001 HC STEP DOWN PRIVATE ROOM

## 2018-08-07 RX ORDER — RAMELTEON 8 MG/1
8 TABLET ORAL NIGHTLY PRN
Status: DISCONTINUED | OUTPATIENT
Start: 2018-08-07 | End: 2018-08-17 | Stop reason: HOSPADM

## 2018-08-07 RX ORDER — TRAMADOL HYDROCHLORIDE 50 MG/1
50 TABLET ORAL EVERY 6 HOURS PRN
Status: DISCONTINUED | OUTPATIENT
Start: 2018-08-07 | End: 2018-08-14

## 2018-08-07 RX ORDER — INSULIN ASPART 100 [IU]/ML
4 INJECTION, SOLUTION INTRAVENOUS; SUBCUTANEOUS
Status: DISCONTINUED | OUTPATIENT
Start: 2018-08-07 | End: 2018-08-09

## 2018-08-07 RX ADMIN — MYCOPHENOLATE MOFETIL 1000 MG: 250 CAPSULE ORAL at 09:08

## 2018-08-07 RX ADMIN — INSULIN ASPART 2 UNITS: 100 INJECTION, SOLUTION INTRAVENOUS; SUBCUTANEOUS at 06:08

## 2018-08-07 RX ADMIN — LEVETIRACETAM 500 MG: 500 TABLET ORAL at 09:08

## 2018-08-07 RX ADMIN — INSULIN ASPART 2 UNITS: 100 INJECTION, SOLUTION INTRAVENOUS; SUBCUTANEOUS at 08:08

## 2018-08-07 RX ADMIN — DOCUSATE SODIUM 100 MG: 100 CAPSULE, LIQUID FILLED ORAL at 09:08

## 2018-08-07 RX ADMIN — HEPARIN SODIUM 5000 UNITS: 5000 INJECTION, SOLUTION INTRAVENOUS; SUBCUTANEOUS at 09:08

## 2018-08-07 RX ADMIN — TACROLIMUS 4 MG: 1 CAPSULE ORAL at 07:08

## 2018-08-07 RX ADMIN — HEPARIN SODIUM 5000 UNITS: 5000 INJECTION, SOLUTION INTRAVENOUS; SUBCUTANEOUS at 05:08

## 2018-08-07 RX ADMIN — ACYCLOVIR 400 MG: 200 CAPSULE ORAL at 09:08

## 2018-08-07 RX ADMIN — PANTOPRAZOLE SODIUM 40 MG: 40 TABLET, DELAYED RELEASE ORAL at 09:08

## 2018-08-07 RX ADMIN — FLUCONAZOLE 200 MG: 200 TABLET ORAL at 09:08

## 2018-08-07 RX ADMIN — RAMELTEON 8 MG: 8 TABLET, FILM COATED ORAL at 09:08

## 2018-08-07 RX ADMIN — TACROLIMUS 4 MG: 1 CAPSULE ORAL at 05:08

## 2018-08-07 RX ADMIN — MICONAZOLE NITRATE: 20 OINTMENT TOPICAL at 09:08

## 2018-08-07 RX ADMIN — INSULIN ASPART 2 UNITS: 100 INJECTION, SOLUTION INTRAVENOUS; SUBCUTANEOUS at 01:08

## 2018-08-07 RX ADMIN — ERYTHROPOIETIN 10000 UNITS: 10000 INJECTION, SOLUTION INTRAVENOUS; SUBCUTANEOUS at 06:08

## 2018-08-07 RX ADMIN — HEPARIN SODIUM 5000 UNITS: 5000 INJECTION, SOLUTION INTRAVENOUS; SUBCUTANEOUS at 02:08

## 2018-08-07 RX ADMIN — ATORVASTATIN CALCIUM 40 MG: 20 TABLET, FILM COATED ORAL at 09:08

## 2018-08-07 RX ADMIN — PREDNISONE 15 MG: 5 TABLET ORAL at 09:08

## 2018-08-07 RX ADMIN — INSULIN ASPART 1 UNITS: 100 INJECTION, SOLUTION INTRAVENOUS; SUBCUTANEOUS at 11:08

## 2018-08-07 RX ADMIN — TRAMADOL HYDROCHLORIDE 50 MG: 50 TABLET, COATED ORAL at 09:08

## 2018-08-07 NOTE — ASSESSMENT & PLAN NOTE
- With CKD3  - Expected VICKY post txp  - UOP unclear as pt unable to measure urine (severe scrotal edema- improving) though feels UOP improving.  - Tolerated HD 8/1 and 8/3.   - Lasix given over weekend and seems to have helped UOP  - Delta Cr 0.2 8/6, 0.5 8/7 and UOP signif increasing  - Nephrology following and HD per their recs.

## 2018-08-07 NOTE — PLAN OF CARE
Pt AAOx4, VSS, afebrile. Pain to L shoulder and BLE controlled with oxycodone 10mg PO. Pt unable to use urinal due to swollen scrotum.  Fall risk precautions initiated.  Pt in lowest bed position setting, lighting adjusted, side rails up x2.  Pt verbalizes understanding to call for assistance. Pt remain free from falls during shift.  Pt's mother pulled self meds correctly 100% and painted chevron incision with betadine.  Call light within reach. Will continue to monitor.

## 2018-08-07 NOTE — SUBJECTIVE & OBJECTIVE
Scheduled Meds:   acyclovir  400 mg Oral BID    atorvastatin  40 mg Oral Daily    docusate sodium  100 mg Oral Daily    epoetin dread  10,000 Units Intravenous Every Tues, Thurs, Sat    ergocalciferol  50,000 Units Oral Q7 Days    fluconazole  200 mg Oral Daily    heparin (porcine)  5,000 Units Subcutaneous Q8H    insulin aspart U-100  4 Units Subcutaneous TIDWM    levETIRAcetam  500 mg Oral BID    miconazole nitrate 2%   Topical (Top) BID    mycophenolate  1,000 mg Oral BID    pantoprazole  40 mg Oral BID    predniSONE  15 mg Oral Daily    sulfamethoxazole-trimethoprim 400-80mg  1 tablet Oral Every Mon, Wed, Fri    tacrolimus  4 mg Oral BID    triamcinolone acetonide 0.1%   Topical (Top) BID     Continuous Infusions:  PRN Meds:sodium chloride 0.9%, dextrose 50%, dextrose 50%, gelatin adsorbable 100cm top sponge, glucagon (human recombinant), glucose, glucose, insulin aspart U-100, ondansetron, ramelteon, sodium chloride 0.9%, traMADol    Review of Systems   Constitutional: Positive for activity change, appetite change and fatigue. Negative for chills, diaphoresis and fever.   HENT: Negative for congestion and facial swelling.    Eyes: Negative for pain, discharge and visual disturbance.   Respiratory: Positive for cough. Negative for chest tightness, shortness of breath and wheezing.    Cardiovascular: Positive for leg swelling. Negative for chest pain and palpitations.   Gastrointestinal: Positive for abdominal distention. Negative for abdominal pain, constipation, diarrhea, nausea and vomiting.   Endocrine: Negative.    Genitourinary: Positive for decreased urine volume (Improving).   Musculoskeletal: Negative for back pain.   Skin: Positive for wound.   Allergic/Immunologic: Positive for immunocompromised state.   Neurological: Positive for weakness. Negative for dizziness, tremors and headaches.   Hematological: Negative.    Psychiatric/Behavioral: Negative for agitation, dysphoric mood and sleep  disturbance. The patient is not nervous/anxious.      Objective:     Vital Signs (Most Recent):  Temp: 98.1 °F (36.7 °C) (08/07/18 1241)  Pulse: 86 (08/07/18 1241)  Resp: 20 (08/07/18 1241)  BP: (!) 162/67 (08/07/18 1241)  SpO2: 96 % (08/07/18 1241) Vital Signs (24h Range):  Temp:  [97 °F (36.1 °C)-98.1 °F (36.7 °C)] 98.1 °F (36.7 °C)  Pulse:  [46-87] 86  Resp:  [16-20] 20  SpO2:  [96 %-99 %] 96 %  BP: (122-162)/(55-69) 162/67     Weight: 129.5 kg (285 lb 6.2 oz)  Body mass index is 36.64 kg/m².    Intake/Output - Last 3 Shifts       08/05 0700 - 08/06 0659 08/06 0700 - 08/07 0659 08/07 0700 - 08/08 0659    P.O. 1480 1180 720    Blood 239      IV Piggyback 50      Total Intake(mL/kg) 1769 (13.9) 1180 (9.1) 720 (5.6)    Net +1769 +1180 +720           Urine Occurrence 11 x 13 x 3 x    Stool Occurrence 1 x 1 x 0 x    Emesis Occurrence 0 x 0 x           Physical Exam   Constitutional: He is oriented to person, place, and time. He appears well-developed. No distress.   Temporal and distal extremity muscle wasting   HENT:   Head: Normocephalic and atraumatic.   Eyes: EOM are normal. Pupils are equal, round, and reactive to light. No scleral icterus.   Cardiovascular: Normal rate, regular rhythm and normal heart sounds.    Pulmonary/Chest: Effort normal.   Coarse BS   Abdominal: Soft. Bowel sounds are normal. He exhibits distension (ascites). There is no tenderness.   Chevron inc cdi no ssi   Musculoskeletal: He exhibits edema (3+ BLE).   Neurological: He is alert and oriented to person, place, and time.   Skin: Skin is warm and dry. He is not diaphoretic.   Psychiatric: He has a normal mood and affect. His behavior is normal. Judgment and thought content normal.   Nursing note and vitals reviewed.      Laboratory:  Immunosuppressants         Stop Route Frequency     tacrolimus capsule 4 mg      -- Oral 2 times daily     mycophenolate capsule 1,000 mg      -- Oral 2 times daily        CBC:     Recent Labs  Lab  08/07/18  0500   WBC 4.40   RBC 2.55*   HGB 8.1*   HCT 24.9*   PLT 79*   MCV 98   MCH 31.8*   MCHC 32.5     CMP:     Recent Labs  Lab 08/07/18  0500   *   CALCIUM 8.2*   ALBUMIN 2.5*   PROT 4.1*   *   K 4.3   CO2 24      BUN 41*   CREATININE 3.9*   ALKPHOS 62   ALT 20   AST 12   BILITOT 0.5     Coagulation:   No results for input(s): PT, INR, APTT in the last 168 hours.  Labs within the past 24 hours have been reviewed.    Diagnostic Results:  None

## 2018-08-07 NOTE — PROGRESS NOTES
[facility]  Liver Transplant  Progress Note    Patient Name: Alon Adamson  MRN: [id number]  Admission Date: 2018  Hospital Length of Stay: 46 days  Code Status: Full Code  Primary Care Provider: Mckinley Vides MD  Post-Operative Day: 15    ORGAN:   LIVER  Disease Etiology: Alcoholic Cirrhosis  Donor Type:    - Brain Death  CDC High Risk:   No  Donor CMV Status:   Donor CMV Status: Negative  Donor HBcAB:   Negative  Donor HCV Status:   Negative  Donor HBV TAMAR: Negative  Donor HCV TAMAR: Negative  Whole or Partial: Whole Liver  Biliary Anastomosis: End to End  Arterial Anatomy: Standard  Subjective:     History of Present Illness:  Mr. Adamson is a 64 yo M with PMH ESLD secondary to ETOH cirrhosis, CKD3, CAD.  Complications of liver disease include hyperbilirubinemia, hypoalbuminemia, severe malnutrition,   hepatic encephalopathy, thrombocytopenia, splenomegaly, ascites, hypervolemia, coagulopathy, portal HTN.  Pt admitted  for acutely worsening hepatic encephalopathy and concern for BLE cellulitis as well as severe volume overload.  Complications of ELSD managed while inpt and he received liver offer on 18.  Pt underwent liver transplant without complication: steroid induction, DBD, CMV -/-.  Donor with E coli bacteremia resistant to cipro placed on Rocephin x 2 weeks per ID recs.  Also on fluconazole x 30 days as pt very ill prior to transplant. Post transplant, with nice trend down of AST/ALT and LFTs.  Liver U/S with elevated RIs and decreased HAV.  During initial post operative period, pt required pressor support.  Pt with expected post operative VICKY as with VICKY prior to surgery.  Nephrology consulted and pt placed on intermittent CRRT.  Pressor requirement decreased and pt tolerated CRRT without pressors.  Pt with asymptomatic bradycardia while in ICU prompting cardiology consult. Pt without need for acute intervention but atropine placed at bedside.  HR has improved POD  7 and 8.  He was transferred to the TSU 7/30 PM.          Liver US 7/31 showed small fluid collection likely an adrenal hemorrhage vs hematoma. H&H decreased to 6.6/20.3 8/1- responded to 1 u PRBCs. H/H stable 8/2 AM. Dropped again 8/3 AM- 1 u PRBC given. H&H now stable. Iron studies checked and pt started on epo.     Hospital Course:  Interval history: No acute events overnight- did not sleep --> ramelteon started. Pt feeling well this am. Tolerated HD 8/1 and 8/3.  Pt given lasix over with weekend and UOP improved.  Delta Cr improving 8/6, increased 8/7.  Continue to monitor closely for renal recovery.  Volume status and electrolytes acceptable today. Pt sat in chair today and worked well with PT.  Continue to monitor progress with PT/OT.    Scheduled Meds:   acyclovir  400 mg Oral BID    atorvastatin  40 mg Oral Daily    docusate sodium  100 mg Oral Daily    epoetin dread  10,000 Units Intravenous Every Tues, Thurs, Sat    ergocalciferol  50,000 Units Oral Q7 Days    fluconazole  200 mg Oral Daily    heparin (porcine)  5,000 Units Subcutaneous Q8H    insulin aspart U-100  4 Units Subcutaneous TIDWM    levETIRAcetam  500 mg Oral BID    miconazole nitrate 2%   Topical (Top) BID    mycophenolate  1,000 mg Oral BID    pantoprazole  40 mg Oral BID    predniSONE  15 mg Oral Daily    sulfamethoxazole-trimethoprim 400-80mg  1 tablet Oral Every Mon, Wed, Fri    tacrolimus  4 mg Oral BID    triamcinolone acetonide 0.1%   Topical (Top) BID     Continuous Infusions:  PRN Meds:sodium chloride 0.9%, dextrose 50%, dextrose 50%, gelatin adsorbable 100cm top sponge, glucagon (human recombinant), glucose, glucose, insulin aspart U-100, ondansetron, ramelteon, sodium chloride 0.9%, traMADol    Review of Systems   Constitutional: Positive for activity change, appetite change and fatigue. Negative for chills, diaphoresis and fever.   HENT: Negative for congestion and facial swelling.    Eyes: Negative for pain,  discharge and visual disturbance.   Respiratory: Positive for cough. Negative for chest tightness, shortness of breath and wheezing.    Cardiovascular: Positive for leg swelling. Negative for chest pain and palpitations.   Gastrointestinal: Positive for abdominal distention. Negative for abdominal pain, constipation, diarrhea, nausea and vomiting.   Endocrine: Negative.    Genitourinary: Positive for decreased urine volume (Improving).   Musculoskeletal: Negative for back pain.   Skin: Positive for wound.   Allergic/Immunologic: Positive for immunocompromised state.   Neurological: Positive for weakness. Negative for dizziness, tremors and headaches.   Hematological: Negative.    Psychiatric/Behavioral: Negative for agitation, dysphoric mood and sleep disturbance. The patient is not nervous/anxious.      Objective:     Vital Signs (Most Recent):  Temp: 98.1 °F (36.7 °C) (08/07/18 1241)  Pulse: 86 (08/07/18 1241)  Resp: 20 (08/07/18 1241)  BP: (!) 162/67 (08/07/18 1241)  SpO2: 96 % (08/07/18 1241) Vital Signs (24h Range):  Temp:  [97 °F (36.1 °C)-98.1 °F (36.7 °C)] 98.1 °F (36.7 °C)  Pulse:  [46-87] 86  Resp:  [16-20] 20  SpO2:  [96 %-99 %] 96 %  BP: (122-162)/(55-69) 162/67     Weight: 129.5 kg (285 lb 6.2 oz)  Body mass index is 36.64 kg/m².    Intake/Output - Last 3 Shifts       08/05 0700 - 08/06 0659 08/06 0700 - 08/07 0659 08/07 0700 - 08/08 0659    P.O. 1480 1180 720    Blood 239      IV Piggyback 50      Total Intake(mL/kg) 1769 (13.9) 1180 (9.1) 720 (5.6)    Net +1769 +1180 +720           Urine Occurrence 11 x 13 x 3 x    Stool Occurrence 1 x 1 x 0 x    Emesis Occurrence 0 x 0 x           Physical Exam   Constitutional: He is oriented to person, place, and time. He appears well-developed. No distress.   Temporal and distal extremity muscle wasting   HENT:   Head: Normocephalic and atraumatic.   Eyes: EOM are normal. Pupils are equal, round, and reactive to light. No scleral icterus.   Cardiovascular: Normal  rate, regular rhythm and normal heart sounds.    Pulmonary/Chest: Effort normal.   Coarse BS   Abdominal: Soft. Bowel sounds are normal. He exhibits distension (ascites). There is no tenderness.   Chevron inc cdi no ssi   Musculoskeletal: He exhibits edema (3+ BLE).   Neurological: He is alert and oriented to person, place, and time.   Skin: Skin is warm and dry. He is not diaphoretic.   Psychiatric: He has a normal mood and affect. His behavior is normal. Judgment and thought content normal.   Nursing note and vitals reviewed.      Laboratory:  Immunosuppressants         Stop Route Frequency     tacrolimus capsule 4 mg      -- Oral 2 times daily     mycophenolate capsule 1,000 mg      -- Oral 2 times daily        CBC:     Recent Labs  Lab 08/07/18  0500   WBC 4.40   RBC 2.55*   HGB 8.1*   HCT 24.9*   PLT 79*   MCV 98   MCH 31.8*   MCHC 32.5     CMP:     Recent Labs  Lab 08/07/18  0500   *   CALCIUM 8.2*   ALBUMIN 2.5*   PROT 4.1*   *   K 4.3   CO2 24      BUN 41*   CREATININE 3.9*   ALKPHOS 62   ALT 20   AST 12   BILITOT 0.5     Coagulation:   No results for input(s): PT, INR, APTT in the last 168 hours.  Labs within the past 24 hours have been reviewed.    Diagnostic Results:  None    Assessment/Plan:     * Liver transplanted    - LFTs/enzymes normalized  - POD 1 U/s with elevated RIs  - Repeat liver U/S with improvement          Long-term use of immunosuppressant medication    - see prophylactic immuno.           Prophylactic immunotherapy    - continue prograf, cellcept and prednisone. Monitor labs daily and adjust dose accordingly for a therapeutic level.           At risk for opportunistic infections    - Continue Bactrim, acyclovir, fluconazole          Sinus bradycardia    - Improved  - Atropine at bedside if HR decreases again          Positive blood culture in cadaveric donor    - E coli bacteremia in donor resistant to Cipro  - Rocephin x 14 days completed --> stop date 8/6         Steroid-induced hyperglycemia    - Endocrine consulted          CKD (chronic kidney disease), stage III    - See VICKY.          Stage II pressure ulcer of sacral region    - Wound care following          Severe protein-calorie malnutrition    - Dietary consulted.  - Continue supplements.  - Pt eating well.        Physical deconditioning    - Improvement noted with PT/OT  - Will place SNF consult once medically ready        Pancytopenia    - Related to decompensated liver disease --> should improve now that pt transplanted.        Acute kidney injury superimposed on chronic kidney disease    - With CKD3  - Expected VICKY post txp  - UOP unclear as pt unable to measure urine (severe scrotal edema- improving) though feels UOP improving.  - Tolerated HD 8/1 and 8/3.   - Lasix given over weekend and seems to have helped UOP  - Delta Cr 0.2 8/6, 0.5 8/7 and UOP signif increasing  - Nephrology following and HD per their recs.         Decompensated hepatic cirrhosis    - Placed status 7 on 6/26 for prescription drug coverage change. Re-activated 7/3. Back on hold d/t frailty and decompensation at the end of last week prompting ICU transfer. Now off hold 7/17.  Plan to submit MELD exception points. Will remeld today at 27.       MELD-Na score: 27 at 7/21/2018  4:01 AM  MELD score: 27 at 7/21/2018  4:01 AM  Calculated from:  Serum Creatinine: 3.7 mg/dL at 7/21/2018  4:00 AM  Serum Sodium: 147 mmol/L (Rounded to 137) at 7/21/2018  4:00 AM  Total Bilirubin: 1.6 mg/dL at 7/21/2018  4:00 AM  INR(ratio): 1.7 at 7/21/2018  4:01 AM  Age: 63 years        Coronary artery disease involving native coronary artery of native heart without angina pectoris    - Continue home ASA.   - 2D Echo 7/9 normal EF (55-60%), trivial tricuspid/mitral regurg.        Anemia of chronic disease    - H&H 6.6/20.3 8/1: Transfused 1 unit PRBCs  - H/H 6.4/19.7: Transfused 1 u PRBC  - Epo started on 8/3  - PRBC 1 unit given 8/5  - H/H improved this AM          Thrombocytopenia    - Secondary to splenomegaly from portal HTN- should improve with txp.  - No s/s overt bleed.        Seizures    - Continue oral Keppra.  - Keep Mg > 2.        Bilateral edema of lower extremity    - Continue intermittent dialysis.   - Tolerated HD well 8/1, again 8/3  - Wrapped by wound care with significant improvement        Hepatic encephalopathy    - ESLD secondary to EtOH cirrhosis with severe complications (hyperbilirubinemia, hypoalbuminemia, severe malnutrition, hepatic encephalopathy, thrombocytopenia, splenomegaly, ascites, hypervolemia, coaguloopathy, portal HTN), seizure disorder on Keppra, CKD3, CAD   - has frequent episodes of lethargy/confusion/slowing when holding lactulose   - admitted to SICU 7/13/18 for worsening encephalopathy and hypothermia  - PO lactulose TID, PRN lactulose enemas, Rifaximin  - sleepy this AM. Given lactulose enema with improvement in mentation. Continue oral lactulose and PRN lactulose enemas for HE control.               VTE Risk Mitigation         Ordered     heparin (porcine) injection 5,000 Units  Every 8 hours      07/26/18 1330     Place sequential compression device  Until discontinued      07/23/18 1326     IP VTE HIGH RISK PATIENT  Once      07/23/18 1242          The patients clinical status was discussed at multidisplinary rounds, involving transplant surgery, transplant medicine, pharmacy, nursing, nutrition, and social work    Discharge Planning:  D/c once kidney function improved and can transition to SNF    Bebeto Virgen PA-C  Liver Transplant  [facility]

## 2018-08-07 NOTE — PROGRESS NOTES
[facility]  Adult Nutrition  Progress Note    SUMMARY       Recommendations    Recommendation/Intervention:   1. Continue current diet order   2. Encourage good po intake  3. Dietitian Following    Goals: 1. 85% EEN & EPN    Nutrition Goal Status: progressing towards goal    Communication of RD Recs:  (POC)    General Information Comments: Pt s/p liver transplant 15 day post op, fair po intake 50% of meals. Consumes 100% of supplements.  Early satiety. Denies N/V/D.     Nutrition Discharge Planning:  Adequate po intake .Post transplant nutrition education provided.     Reason for Assessment  Reason for Assessment: RD follow-up  Diagnosis: transplant/postoperative complications   Acute kidney injury superimposed on chronic kidney disease    2. Bilateral edema of lower extremity    3. Ascites due to alcoholic cirrhosis    4. Cellulitis of both lower extremities    5. Cholestatic pruritus    6. Hepatic encephalopathy    7. Physical deconditioning    8. Thrombocytopenia    9. Coagulopathy    10. Decompensated hepatic cirrhosis    11. Hypoalbuminemia due to protein-calorie malnutrition    12. Seizures    13. Chest pain    14. Elevated troponin    15. Coronary artery disease involving native coronary artery of native heart without angina pectoris    16. Gram-positive bacteremia    17. Anemia of chronic disease    18. CKD (chronic kidney disease), stage III    19. Dermatitis associated with moisture    20. Pancytopenia    21. Hypervolemia    22. Hypernatremia    23. Severe protein-calorie malnutrition    24. Hypothermia, subsequent encounter    25. Acidosis    26. Nausea and vomiting, intractability of vomiting not specified, unspecified vomiting type    27. Bradycardia    28. Liver transplanted    29. At risk for opportunistic infections    30. Prophylactic immunotherapy    31. Long-term use of immunosuppressant medication      Relevant Medical History:   Anticoagulant long-term use      Arthritis    "   Back pain      Cellulitis      Cirrhosis      Coronary artery disease      DM (diabetes mellitus)      Hearing loss       right ear    Hepatic encephalopathy      Hypertension       diagnosed today (11/25/2015) and prescribed toprol    Leg edema      Nephrolithiasis      JACK (obstructive sleep apnea)      Seizures      Splenomegaly      Interdisciplinary Rounds: did not attend     Nutrition Risk Screen  Nutrition Risk Screen: other (see comments)    Nutrition/Diet History  Patient Reported Diet/Restrictions/Preferences: low salt  Typical Food/Fluid Intake: Patient with decreased PO itnake prior to surgery.  Do you have any cultural, spiritual, Restorationism conflicts, given your current situation?: none stated  Food Allergies: NKFA    Anthropometrics  Temp: 98.1 °F (36.7 °C)  Height: 6' 2" (188 cm)  Height (inches): 74 in  Weight Method: Bed Scale  Weight: 129.5 kg (285 lb 6.2 oz)  Weight (lb): 285.39 lb  Ideal Body Weight (IBW), Male: 190 lb  % Ideal Body Weight, Male (lb): 148.98 lb  BMI (Calculated): 36.4  BMI Grade: 35 - 39.9 - obesity - grade II    Lab/Procedures/Meds  Pertinent Labs Reviewed: reviewed  Pertinent Labs Comments: Gluc 133    (L) 08/07/2018    K 4.3 08/07/2018     08/07/2018    CREATININE 3.9 (H) 08/07/2018    BUN 41 (H) 08/07/2018     Pertinent Medications: Reviewed  Scheduled Meds:   acyclovir  400 mg Oral BID    atorvastatin  40 mg Oral Daily    docusate sodium  100 mg Oral Daily    epoetin dread  10,000 Units Intravenous Every Tues, Thurs, Sat    ergocalciferol  50,000 Units Oral Q7 Days    fluconazole  200 mg Oral Daily    heparin (porcine)  5,000 Units Subcutaneous Q8H    insulin aspart U-100  2 Units Subcutaneous TIDWM    levETIRAcetam  500 mg Oral BID    miconazole nitrate 2%   Topical (Top) BID    mycophenolate  1,000 mg Oral BID    pantoprazole  40 mg Oral BID    predniSONE  15 mg Oral Daily    sulfamethoxazole-trimethoprim 400-80mg  1 tablet Oral " Every Mon, Wed, Fri    tacrolimus  4 mg Oral BID    triamcinolone acetonide 0.1%   Topical (Top) BID       Physical Findings/Assessment  Overall Physical Appearance: obese  Tubes:  (-)  Oral/Mouth Cavity: tooth/teeth missing  Skin: incision(s)    Estimated/Assessed Needs  Weight Used For Calorie Calculations: 86.4 kg (190 lb 6.2 oz) (IBW)  Energy Calorie Requirements (kcal): 1571-6828 (25-30 kcal/kg)  Energy Need Method: [place]  Protein Requirements:  (1.0-1.2 gm/kg)  Weight Used For Protein Calculations: 86.4 kg (190 lb 6.2 oz) (IBW)  Fluid Requirements (mL): 2159 (1 ml/kcal or per team)  Fluid Need Method: RDA Method  RDA Method (mL): 2159    Nutrition Prescription Ordered    Current Diet Order: regular  Oral Nutrition Supplement: Boost Glucose Control, Beneprotein    Evaluation of Received Nutrient/Fluid Intake    Oral Calories (kcal): 0  Oral Protein (gm): 0  Oral Fluid (mL): 0  I/O: +1.4L x 24hrs, +27.4L since admit  Energy Calories Required: meeting needs  Protein Required: meeting needs  Fluid Required:  (-)  Comments: LBM 7/27  % Intake of Estimated Energy Needs: 75 - 100 %  % Meal Intake: 50 - 75 %    Nutrition Risk  Level of Risk/Frequency of Follow-up: low     Assessment and Plan  Severe protein-calorie malnutrition     Contributing Nutrition Diagnosis  Malnutrition     Related to (etiology):   Chronic Illness/Injury     Signs and Symptoms (as evidenced by):  Energy Intake: <50% of estimated energy requirement for 1 month  Weight Loss: 9% x 1 month   Fluid Accumulation: moderate     Interventions/Recommendations (treatment strategy):  Full assessment completed, see RD Note 7/31/2018.     Nutrition Diagnosis Status:  Continues     Monitor and Evaluation  Food and Nutrient Intake: food and beverage intake, energy intake  Food and Nutrient Adminstration: diet order  Knowledge/Beliefs/Attitudes: food and nutrition knowledge/skill  Physical Activity and Function: nutrition-related ADLs and  IADLs  Anthropometric Measurements: weight, weight change  Biochemical Data, Medical Tests and Procedures: electrolyte and renal panel, gastrointestinal profile, glucose/endocrine profile, inflammatory profile, lipid profile  Nutrition-Focused Physical Findings: overall appearance     Nutrition Follow-Up  RD Follow-up?: Yes

## 2018-08-07 NOTE — PLAN OF CARE
Problem: Patient Care Overview  Goal: Plan of Care Review  Outcome: Ongoing (interventions implemented as appropriate)  PT/OT assisted pt to medichair. Tolerating sitting in chair well outside of room.  Afebrile. Immunosuppression continues. Mother prepared self-meds and painted incision with Betadine correctly today. Cr=3.9 increased from 3.4 yesterday. Pt continues to void. Unable to measure due to retracted penis in scrotum. Scrotal edema decreased. No HD ordered for today.  BLE re wrapped by wound care RN after pt had break with wrap off.  Dermatitis remains. Miconazole cream applied to sacrum, buttocks, and bilat groins. Chevron incision intact with staples. Pain meds changed to Tramadol to control pain to L shoulder. Warm paks applied to L shoulder prn to decrease pain. Mother remains at BS.

## 2018-08-07 NOTE — PT/OT/SLP PROGRESS
Occupational Therapy   Treatment    Name: Alon Adamson  MRN: [id number]  Admitting Diagnosis:  Liver transplanted  15 Days Post-Op    Recommendations:     Discharge Recommendations: nursing facility, skilled  Discharge Equipment Recommendations:  walker, rolling  Barriers to discharge:  Decreased caregiver support, Inaccessible home environment    Subjective     Communicated with: nsg prior to session. Cc with PT  Pain/Comfort:  · Pain Rating 1: 5/10 (L shoulder pain)  · Pain Addressed 1: Reposition, Distraction, Pre-medicate for activity, Other (see comments) (NP bedside and aware)  · Pain Rating Post-Intervention 1: 5/10    Patients cultural, spiritual, Caodaism conflicts given the current situation: none    Objective:     Patient found with: central line, telemetry    General Precautions: Standard, fall   Orthopedic Precautions:N/A   Braces:       Occupational Performance:    Bed Mobility:    · Sup to sit with min assist, HOB raised, increased time     Functional Mobility/Transfers:  · Sit to stand with mod assist x 2  · Functional Mobility: took 6 steps to medi chair with RW and min assist x 2, cues   · Scooting back in chair with mod assist    Activities of Daily Living:  · Sock donned with total assist EOB  · Applied lotion to BUE's seated in chair with increased time and setup    Patient left up in chair with nsg notified and mom present    [facility] 6 Click:  AMPA Total Score: 12    Treatment & Education:  Performed above activity, educated on POC, performed BUE A/AAROM for shoulder presses within pain tolerance x 10 reps, shoulder rolls x 10 reps abduction x 10 reps within pain tolerance, bicep curls and hand squeezes x 10 reps; educated on importance of exercises, sitting up as much as tolerated, assisting with all ADL's and mobility as much as possible.  Education:    Assessment:     Alon Adamson is a 63 y.o. male with a medical diagnosis of Liver transplanted.  He presents with slowly  improving strength and mobility.  Performance deficits affecting function are weakness, impaired endurance, impaired self care skills, impaired functional mobilty, gait instability, impaired balance, decreased upper extremity function, decreased lower extremity function, pain, impaired cardiopulmonary response to activity.      Rehab Prognosis:  good; patient would benefit from acute skilled OT services to address these deficits and reach maximum level of function.       Plan:     Patient to be seen 4 x/week to address the above listed problems via self-care/home management, therapeutic activities, therapeutic exercises, neuromuscular re-education  · Plan of Care Expires: 08/26/18  · Plan of Care Reviewed with: patient, mother    This Plan of care has been discussed with the patient who was involved in its development and understands and is in agreement with the identified goals and treatment plan    GOALS:    Occupational Therapy Goals        Problem: Occupational Therapy Goal    Goal Priority Disciplines Outcome Interventions   Occupational Therapy Goal     OT, PT/OT Ongoing (interventions implemented as appropriate)    Description:  Goals to be met by: 8/10/18    Patient will increase functional independence with ADLs by performing:     Upper body dressing while seated EOB with SBA  Grooming while standing at sink with Stand-by Assistance  Toileting from bedside commode with Minimal Assistance for hygiene and clothing management.  Toilet transfer to bedside commode with Contact Guard Assistance.  Pt will complete a functional static standing activity x ~4 minutes with CGA.   LB Dressing with Moderate Assistance                            Time Tracking:     OT Date of Treatment: 08/07/18  OT Start Time: 0955  OT Stop Time: 1040  OT Total Time (min): 45 min    Billable Minutes:Therapeutic Activity 15 min  Therapeutic Exercise 30 min    Anni Roman OT  8/7/2018

## 2018-08-07 NOTE — SUBJECTIVE & OBJECTIVE
Interval History: continues with multiple (> 10) unmeasured urinations, sCr 3.4 --> 3.9, no distress    Review of patient's allergies indicates:   Allergen Reactions    Nsaids (non-steroidal anti-inflammatory drug)      D/t to liver disease.    Tylenol [acetaminophen]      D/t liver disease.    Penicillins Other (See Comments)     Tolerated pip-tazo in 8/2016 without issue  Tolerated cefepime 7/2018 without issue  Since childhood was told not to take it     Current Facility-Administered Medications   Medication Frequency    0.9%  NaCl infusion PRN    acyclovir capsule 400 mg BID    atorvastatin tablet 40 mg Daily    dextrose 50% injection 12.5 g PRN    dextrose 50% injection 25 g PRN    docusate sodium capsule 100 mg Daily    epoetin dread injection 10,000 Units Every Tues, Thurs, Sat    ergocalciferol capsule 50,000 Units Q7 Days    fluconazole tablet 200 mg Daily    gelatin adsorbable 100cm top sponge 100 sponge 1 applicator PRN    glucagon (human recombinant) injection 1 mg PRN    glucose chewable tablet 16 g PRN    glucose chewable tablet 24 g PRN    heparin (porcine) injection 5,000 Units Q8H    insulin aspart U-100 pen 0-5 Units QID (AC + HS) PRN    insulin aspart U-100 pen 4 Units TIDWM    levETIRAcetam tablet 500 mg BID    miconazole nitrate 2% ointment BID    mycophenolate capsule 1,000 mg BID    ondansetron injection 4 mg Q8H PRN    pantoprazole EC tablet 40 mg BID    predniSONE tablet 15 mg Daily    ramelteon tablet 8 mg Nightly PRN    sodium chloride 0.9% flush 3 mL PRN    sulfamethoxazole-trimethoprim 400-80mg per tablet 1 tablet Every Mon, Wed, Fri    tacrolimus capsule 4 mg BID    traMADol tablet 50 mg Q6H PRN    triamcinolone acetonide 0.1% cream BID       Objective:     Vital Signs (Most Recent):  Temp: 98.1 °F (36.7 °C) (08/07/18 1603)  Pulse: 78 (08/07/18 1603)  Resp: 18 (08/07/18 1603)  BP: (!) 158/76 (08/07/18 1603)  SpO2: 99 % (08/07/18 1603)  O2 Device (Oxygen  Therapy): room air (08/07/18 1603) Vital Signs (24h Range):  Temp:  [97 °F (36.1 °C)-98.1 °F (36.7 °C)] 98.1 °F (36.7 °C)  Pulse:  [46-87] 78  Resp:  [16-20] 18  SpO2:  [96 %-99 %] 99 %  BP: (122-162)/(55-76) 158/76     Weight: 129.5 kg (285 lb 6.2 oz) (08/07/18 0500)  Body mass index is 36.64 kg/m².  Body surface area is 2.6 meters squared.    I/O last 3 completed shifts:  In: 1580 [P.O.:1580]  Out: -     Physical Exam   HENT:   Head: Atraumatic.   Neck: No JVD present.   Cardiovascular: Normal rate and regular rhythm.  Exam reveals no friction rub.    Pulmonary/Chest: Effort normal.   Breath sounds distant   Abdominal: Soft. He exhibits no distension.   Musculoskeletal: He exhibits edema.   Neurological: He is alert.   Skin: Skin is warm.

## 2018-08-07 NOTE — PLAN OF CARE
Recommendation/Intervention:   1. Continue current diet order   2. Encourage good po intake  3. Dietitian Following     Goals:   1. 85% EEN & EPN     Nutrition Goal Status: progressing towards goal     Nutrition Discharge Planning:  Adequate po intake .Post transplant nutrition education provided.

## 2018-08-07 NOTE — PROGRESS NOTES
[facility]  Nephrology  Progress Note    Patient Name: Alon Adamson  MRN: [id number]  Admission Date: 6/22/2018  Hospital Length of Stay: 46 days  Attending Provider: Scott Hoffmann MD   Primary Care Physician: Mckinley Vides MD  Principal Problem:Liver transplanted    Subjective:     HPI: Mr. Adamson is 63 yr old male with decompensated alcoholic cirrhosis, CKD III and CAD admitted here on 06/22/18 admitted for bilateral LE hypervolemia and VICKY on CKD III with cr of 2.3 baseline around 1.5. Patient has multiple hospitalization in the past secondary to decompensated liver failure. Patient is currently on transplant team. During current admission patient was getting lasix and albumin intermittently for VICKY however renal function was not getting better. Patient hospital course complicated by hepatic encephalopathy with some improvement of mentation with lactulose. Also treated for cellulitis with 7 days of vancomycin.Nephrology is consulted for worsening/not improving VICKY despite lasix/albumin.     Per chart review patient has no hypotensive episodes, BP mostly above 100's. Has not received nephrotoxins such as NSAIDs/contrast media. No sever sepsis/septic shock or acute blood loss during current admit. UA with 2+ leukocytes and Hyaline casts, no wbc/rbc cast or proteinuria. Urine Na+ < 20.    Patient was transferred to ICU on 7/13/2018 after being more lethargic and hypoactive.  After two days was stepped down back to [facility].       Interval History: continues with multiple (> 10) unmeasured urinations, sCr 3.4 --> 3.9, no distress    Review of patient's allergies indicates:   Allergen Reactions    Nsaids (non-steroidal anti-inflammatory drug)      D/t to liver disease.    Tylenol [acetaminophen]      D/t liver disease.    Penicillins Other (See Comments)     Tolerated pip-tazo in 8/2016 without issue  Tolerated cefepime 7/2018 without issue  Since childhood was told not to take it      Current Facility-Administered Medications   Medication Frequency    0.9%  NaCl infusion PRN    acyclovir capsule 400 mg BID    atorvastatin tablet 40 mg Daily    dextrose 50% injection 12.5 g PRN    dextrose 50% injection 25 g PRN    docusate sodium capsule 100 mg Daily    epoetin dread injection 10,000 Units Every Tues, Thurs, Sat    ergocalciferol capsule 50,000 Units Q7 Days    fluconazole tablet 200 mg Daily    gelatin adsorbable 100cm top sponge 100 sponge 1 applicator PRN    glucagon (human recombinant) injection 1 mg PRN    glucose chewable tablet 16 g PRN    glucose chewable tablet 24 g PRN    heparin (porcine) injection 5,000 Units Q8H    insulin aspart U-100 pen 0-5 Units QID (AC + HS) PRN    insulin aspart U-100 pen 4 Units TIDWM    levETIRAcetam tablet 500 mg BID    miconazole nitrate 2% ointment BID    mycophenolate capsule 1,000 mg BID    ondansetron injection 4 mg Q8H PRN    pantoprazole EC tablet 40 mg BID    predniSONE tablet 15 mg Daily    ramelteon tablet 8 mg Nightly PRN    sodium chloride 0.9% flush 3 mL PRN    sulfamethoxazole-trimethoprim 400-80mg per tablet 1 tablet Every Mon, Wed, Fri    tacrolimus capsule 4 mg BID    traMADol tablet 50 mg Q6H PRN    triamcinolone acetonide 0.1% cream BID       Objective:     Vital Signs (Most Recent):  Temp: 98.1 °F (36.7 °C) (08/07/18 1603)  Pulse: 78 (08/07/18 1603)  Resp: 18 (08/07/18 1603)  BP: (!) 158/76 (08/07/18 1603)  SpO2: 99 % (08/07/18 1603)  O2 Device (Oxygen Therapy): room air (08/07/18 1603) Vital Signs (24h Range):  Temp:  [97 °F (36.1 °C)-98.1 °F (36.7 °C)] 98.1 °F (36.7 °C)  Pulse:  [46-87] 78  Resp:  [16-20] 18  SpO2:  [96 %-99 %] 99 %  BP: (122-162)/(55-76) 158/76     Weight: 129.5 kg (285 lb 6.2 oz) (08/07/18 0500)  Body mass index is 36.64 kg/m².  Body surface area is 2.6 meters squared.    I/O last 3 completed shifts:  In: 1580 [P.O.:1580]  Out: -     Physical Exam   HENT:   Head: Atraumatic.   Neck: No  "JVD present.   Cardiovascular: Normal rate and regular rhythm.  Exam reveals no friction rub.    Pulmonary/Chest: Effort normal.   Breath sounds distant   Abdominal: Soft. He exhibits no distension.   Musculoskeletal: He exhibits edema.   Neurological: He is alert.   Skin: Skin is warm.         Assessment/Plan:     CKD (chronic kidney disease), stage III    No with acute component and requiring dialysis (see VICKY section)        Acute kidney injury superimposed on chronic kidney disease    Started on RRT, today with increasing sCr, although acid/base and lytes are fine, he is still significantly volume overloaded    - continues to make urine and output may be increasing, although not adequately recorded (goes on the pad in the bed), continues with a slighly increasing sCr day by day, but remaining w/o definitive RRT indication, not "out of the woods" yet and may need further RRT, but for now looking good    - continue holding RRT and reassess daily  - strict Is & Os and serial RFPs  - Avoid nephrotoxic medications  - Hb > 7gm/dL            Thank you for your consult. I will follow-up with patient. Please contact us if you have any additional questions.    Elan Magana MD  Nephrology  [facility]    ATTENDING PHYSICIAN ATTESTATION  I have personally interviewed and examined the patient. I thoroughly reviewed the demographic, clinical, laboratorial and imaging information available in medical records. I agree with the assessment and recommendations provided by the subspecialty resident. Dr. Magana was under my supervision.   "

## 2018-08-07 NOTE — ASSESSMENT & PLAN NOTE
"Started on RRT, today with increasing sCr, although acid/base and lytes are fine, he is still significantly volume overloaded    - continues to make urine and output may be increasing, although not adequately recorded (goes on the pad in the bed), continues with a slighly increasing sCr day by day, but remaining w/o definitive RRT indication, not "out of the woods" yet and may need further RRT, but for now looking good    - continue holding RRT and reassess daily  - strict Is & Os and serial RFPs  - Avoid nephrotoxic medications  - Hb > 7gm/dL  "

## 2018-08-07 NOTE — PT/OT/SLP PROGRESS
Physical Therapy Treatment    Patient Name:  Alon Adamson   MRN:  [id number]    Recommendations:     Discharge Recommendations:  nursing facility, skilled   Discharge Equipment Recommendations:  (TBD)   Barriers to discharge: Inaccessible home and Decreased caregiver support    Assessment:     Alon Adamson is a 63 y.o. male admitted with a medical diagnosis of Liver transplanted.  He presents with the following impairments/functional limitations:  weakness, impaired endurance, impaired self care skills, impaired functional mobilty, gait instability, impaired balance, decreased coordination, decreased lower extremity function, pain, impaired skin, impaired joint extensibility, impaired muscle length, decreased upper extremity function limiting overall functional mobility. Bed mobility with Min Assist. Sit-stand with Mod Assist of 2 persons using rolling walker. Stand pivot with Min Assist using rolling walker. No evidence of knee buckling noted. Demonstrates understanding of LE HEP to be performed. To benefit from continued skilled PT intervention to address deficits.    Rehab Prognosis:  Good; patient would benefit from acute skilled PT services to address these deficits and reach maximum level of function.      Recent Surgery: Procedure(s) (LRB):  TRANSPLANT, LIVER (N/A) 15 Days Post-Op    Plan:     During this hospitalization, patient to be seen 4 x/week to address the above listed problems via gait training, therapeutic activities, therapeutic exercises, neuromuscular re-education  · Plan of Care Expires:  08/26/18   Plan of Care Reviewed with: patient, mother    Subjective     Communicated with RN prior to session.  Patient found supine with mother present upon PT entry to room, agreeable to treatment.      Chief Complaint: R shoulder pain  Pain/Comfort:  · Pain Rating 1:  (5/10 L shoulder pain)    Patients cultural, spiritual, Orthodoxy conflicts given the current situation: none  stated    Objective:     Patient found with: telemetry, central line     General Precautions: Standard, fall   Orthopedic Precautions:N/A   Braces: N/A     Functional Mobility:  · Bed Mobility:  Rolling Right: minimum assistance  · Scooting: contact guard assistance  · Supine to Sit: minimum assistance  · Transfers:  Sit to Stand:  moderate assistance with rolling walker and 2 persons and elevated bed  · Bed to Chair: minimum assistance with  rolling walker  using  Stand Pivot and 2 persons and elevated bed  · Gait: 2-4 small steps to bedside chair using rolling walker. Verbal and tactile cues provided for TKE  · Balance: Good static sitting balance; Poor dynamic standing balance using rolling walker      AM-PAC 6 CLICK MOBILITY  Turning over in bed (including adjusting bedclothes, sheets and blankets)?: 3  Sitting down on and standing up from a chair with arms (e.g., wheelchair, bedside commode, etc.): 2  Moving from lying on back to sitting on the side of the bed?: 3  Moving to and from a bed to a chair (including a wheelchair)?: 3  Need to walk in hospital room?: 2  Climbing 3-5 steps with a railing?: 1  Basic Mobility Total Score: 14       Therapeutic Activities and Exercises:  Co-treat with OT.     Patient educated on:   - role of PT/POC    - safety with all functional mobility   - bed mobility training   - transfer training   - gait training with rolling walker   - LE HEP to be performed   - importance of participation with therapy services   - safes to ambulate with rolling walker and 1-2 person assist at this time.    Verbalized understanding of all education provided.    Demonstrates understanding of LE HEP to be performed including:   - ankle pumps   - LAQ   - march   - hip abduction/adduction      Patient left up in chair with all lines intact, call button in reach and RN/OT/mother present..    GOALS:    Physical Therapy Goals        Problem: Physical Therapy Goal    Goal Priority Disciplines Outcome Goal  Variances Interventions   Physical Therapy Goal     PT/OT, PT Ongoing (interventions implemented as appropriate)     Description:  Goals to be met by: 2018    Patient will increase functional independence with mobility by performin. Supine to sit with moderate assistance -  Met  Updated: Supine to sit with Supervision  2. Sit to stand with moderate assistance - met (2018)  Updated: Sit to stand with minimum assistance using rolling walker  3. Gait x 50ft with minimal assistance with RW. - not met  4. Pt will be able to perform hip flexion, hip abduction, and heel raises while standing with use of rolling walker (denoting improvement in balance)- Min A. Not met                                  Time Tracking:     PT Received On: 18  PT Start Time: 955     PT Stop Time: 1020  PT Total Time (min): 25 min     Billable Minutes: Therapeutic Activity 15    Treatment Type: Treatment  PT/PTA: PT     PTA Visit Number: 0     Sampson Fuller III, PT  2018

## 2018-08-07 NOTE — PLAN OF CARE
Problem: Physical Therapy Goal  Goal: Physical Therapy Goal  Goals to be met by: 2018    Patient will increase functional independence with mobility by performin. Supine to sit with moderate assistance -  Met  Updated: Supine to sit with Supervision  2. Sit to stand with moderate assistance - met (2018)  Updated: Sit to stand with minimum assistance using rolling walker  3. Gait x 50ft with minimal assistance with RW. - not met  4. Pt will be able to perform hip flexion, hip abduction, and heel raises while standing with use of rolling walker (denoting improvement in balance)- Min A. Not met                Outcome: Ongoing (interventions implemented as appropriate)  Goals updated to reflect progress. Remain appropriate to improve functional mobility.    Sampson Fuller III, DPT, PT  2018

## 2018-08-07 NOTE — PLAN OF CARE
Hyperglycemic overnight requiring correction with sliding scale. Eating 25% of meals. No hypoglycemic episodes.     Recommendations:  1. Steroid induced hyperglycemia  --increase aspart to 4U TID w M  --continue to monitor BG AC/HS and low dose insulin correction scale    Discharge needs: tbsayda Daigle PGY-3  Endocrine Consults

## 2018-08-08 PROBLEM — I87.323 CHRONIC VENOUS HYPERTENSION (IDIOPATHIC) WITH INFLAMMATION OF BILATERAL LOWER EXTREMITY: Status: RESOLVED | Noted: 2018-07-20 | Resolved: 2018-08-08

## 2018-08-08 PROBLEM — K74.60 DECOMPENSATED HEPATIC CIRRHOSIS: Status: RESOLVED | Noted: 2018-01-21 | Resolved: 2018-08-08

## 2018-08-08 PROBLEM — K72.90 DECOMPENSATED HEPATIC CIRRHOSIS: Status: RESOLVED | Noted: 2018-01-21 | Resolved: 2018-08-08

## 2018-08-08 LAB
ALBUMIN SERPL BCP-MCNC: 2.6 G/DL
ALP SERPL-CCNC: 64 U/L
ALT SERPL W/O P-5'-P-CCNC: 18 U/L
ANION GAP SERPL CALC-SCNC: 7 MMOL/L
AST SERPL-CCNC: 12 U/L
BASOPHILS # BLD AUTO: 0.04 K/UL
BASOPHILS NFR BLD: 0.8 %
BILIRUB SERPL-MCNC: 0.5 MG/DL
BUN SERPL-MCNC: 49 MG/DL
CALCIUM SERPL-MCNC: 8.2 MG/DL
CHLORIDE SERPL-SCNC: 104 MMOL/L
CO2 SERPL-SCNC: 22 MMOL/L
CREAT SERPL-MCNC: 3.9 MG/DL
DIFFERENTIAL METHOD: ABNORMAL
EOSINOPHIL # BLD AUTO: 0.1 K/UL
EOSINOPHIL NFR BLD: 1.3 %
ERYTHROCYTE [DISTWIDTH] IN BLOOD BY AUTOMATED COUNT: 17.1 %
EST. GFR  (AFRICAN AMERICAN): 17.9 ML/MIN/1.73 M^2
EST. GFR  (NON AFRICAN AMERICAN): 15.5 ML/MIN/1.73 M^2
GLUCOSE SERPL-MCNC: 134 MG/DL
HCT VFR BLD AUTO: 25.2 %
HGB BLD-MCNC: 8.3 G/DL
IMM GRANULOCYTES # BLD AUTO: 0.04 K/UL
IMM GRANULOCYTES NFR BLD AUTO: 0.8 %
LYMPHOCYTES # BLD AUTO: 0.5 K/UL
LYMPHOCYTES NFR BLD: 9.9 %
MAGNESIUM SERPL-MCNC: 1.7 MG/DL
MCH RBC QN AUTO: 32 PG
MCHC RBC AUTO-ENTMCNC: 32.9 G/DL
MCV RBC AUTO: 97 FL
MONOCYTES # BLD AUTO: 0.6 K/UL
MONOCYTES NFR BLD: 12.8 %
NEUTROPHILS # BLD AUTO: 3.5 K/UL
NEUTROPHILS NFR BLD: 74.4 %
NRBC BLD-RTO: 0 /100 WBC
PHOSPHATE SERPL-MCNC: 4.6 MG/DL
PLATELET # BLD AUTO: 74 K/UL
PMV BLD AUTO: 10.2 FL
POCT GLUCOSE: 113 MG/DL (ref 70–110)
POCT GLUCOSE: 132 MG/DL (ref 70–110)
POCT GLUCOSE: 186 MG/DL (ref 70–110)
POCT GLUCOSE: 204 MG/DL (ref 70–110)
POTASSIUM SERPL-SCNC: 4.7 MMOL/L
PROT SERPL-MCNC: 4.3 G/DL
RBC # BLD AUTO: 2.59 M/UL
SODIUM SERPL-SCNC: 133 MMOL/L
TACROLIMUS BLD-MCNC: 6.7 NG/ML
WBC # BLD AUTO: 4.76 K/UL

## 2018-08-08 PROCEDURE — 80053 COMPREHEN METABOLIC PANEL: CPT

## 2018-08-08 PROCEDURE — 25000003 PHARM REV CODE 250: Performed by: SURGERY

## 2018-08-08 PROCEDURE — 97110 THERAPEUTIC EXERCISES: CPT

## 2018-08-08 PROCEDURE — 97535 SELF CARE MNGMENT TRAINING: CPT

## 2018-08-08 PROCEDURE — 99900035 HC TECH TIME PER 15 MIN (STAT)

## 2018-08-08 PROCEDURE — 85025 COMPLETE CBC W/AUTO DIFF WBC: CPT

## 2018-08-08 PROCEDURE — 99232 SBSQ HOSP IP/OBS MODERATE 35: CPT | Mod: GC,,, | Performed by: INTERNAL MEDICINE

## 2018-08-08 PROCEDURE — 97530 THERAPEUTIC ACTIVITIES: CPT

## 2018-08-08 PROCEDURE — 25000003 PHARM REV CODE 250: Performed by: STUDENT IN AN ORGANIZED HEALTH CARE EDUCATION/TRAINING PROGRAM

## 2018-08-08 PROCEDURE — 63600175 PHARM REV CODE 636 W HCPCS: Performed by: NURSE PRACTITIONER

## 2018-08-08 PROCEDURE — 20600001 HC STEP DOWN PRIVATE ROOM

## 2018-08-08 PROCEDURE — 63600175 PHARM REV CODE 636 W HCPCS: Performed by: STUDENT IN AN ORGANIZED HEALTH CARE EDUCATION/TRAINING PROGRAM

## 2018-08-08 PROCEDURE — 83735 ASSAY OF MAGNESIUM: CPT

## 2018-08-08 PROCEDURE — 25000003 PHARM REV CODE 250: Performed by: PHYSICIAN ASSISTANT

## 2018-08-08 PROCEDURE — 97116 GAIT TRAINING THERAPY: CPT

## 2018-08-08 PROCEDURE — 25000003 PHARM REV CODE 250: Performed by: NURSE PRACTITIONER

## 2018-08-08 PROCEDURE — 27000646 HC AEROBIKA DEVICE

## 2018-08-08 PROCEDURE — 94799 UNLISTED PULMONARY SVC/PX: CPT

## 2018-08-08 PROCEDURE — 84100 ASSAY OF PHOSPHORUS: CPT

## 2018-08-08 PROCEDURE — 99233 SBSQ HOSP IP/OBS HIGH 50: CPT | Mod: 24,,, | Performed by: NURSE PRACTITIONER

## 2018-08-08 PROCEDURE — 94664 DEMO&/EVAL PT USE INHALER: CPT

## 2018-08-08 PROCEDURE — 80197 ASSAY OF TACROLIMUS: CPT

## 2018-08-08 PROCEDURE — 63600175 PHARM REV CODE 636 W HCPCS: Performed by: SURGERY

## 2018-08-08 PROCEDURE — 94761 N-INVAS EAR/PLS OXIMETRY MLT: CPT

## 2018-08-08 RX ORDER — ACETAMINOPHEN 500 MG
500 TABLET ORAL EVERY 6 HOURS PRN
Status: DISCONTINUED | OUTPATIENT
Start: 2018-08-08 | End: 2018-08-17 | Stop reason: HOSPADM

## 2018-08-08 RX ORDER — OXYCODONE HYDROCHLORIDE 5 MG/1
5 TABLET ORAL EVERY 4 HOURS PRN
Status: DISCONTINUED | OUTPATIENT
Start: 2018-08-08 | End: 2018-08-17 | Stop reason: HOSPADM

## 2018-08-08 RX ORDER — OXYCODONE HYDROCHLORIDE 5 MG/1
10 TABLET ORAL EVERY 4 HOURS PRN
Status: DISCONTINUED | OUTPATIENT
Start: 2018-08-08 | End: 2018-08-09

## 2018-08-08 RX ORDER — SIMETHICONE 80 MG
1 TABLET,CHEWABLE ORAL 3 TIMES DAILY PRN
Status: DISCONTINUED | OUTPATIENT
Start: 2018-08-08 | End: 2018-08-17 | Stop reason: HOSPADM

## 2018-08-08 RX ADMIN — RAMELTEON 8 MG: 8 TABLET, FILM COATED ORAL at 09:08

## 2018-08-08 RX ADMIN — TRAMADOL HYDROCHLORIDE 50 MG: 50 TABLET, COATED ORAL at 07:08

## 2018-08-08 RX ADMIN — SIMETHICONE CHEW TAB 80 MG 80 MG: 80 TABLET ORAL at 09:08

## 2018-08-08 RX ADMIN — LEVETIRACETAM 500 MG: 500 TABLET ORAL at 08:08

## 2018-08-08 RX ADMIN — PANTOPRAZOLE SODIUM 40 MG: 40 TABLET, DELAYED RELEASE ORAL at 08:08

## 2018-08-08 RX ADMIN — MICONAZOLE NITRATE: 20 OINTMENT TOPICAL at 09:08

## 2018-08-08 RX ADMIN — HEPARIN SODIUM 5000 UNITS: 5000 INJECTION, SOLUTION INTRAVENOUS; SUBCUTANEOUS at 09:08

## 2018-08-08 RX ADMIN — ACYCLOVIR 400 MG: 200 CAPSULE ORAL at 08:08

## 2018-08-08 RX ADMIN — LEVETIRACETAM 500 MG: 500 TABLET ORAL at 09:08

## 2018-08-08 RX ADMIN — ACYCLOVIR 400 MG: 200 CAPSULE ORAL at 09:08

## 2018-08-08 RX ADMIN — HEPARIN SODIUM 5000 UNITS: 5000 INJECTION, SOLUTION INTRAVENOUS; SUBCUTANEOUS at 05:08

## 2018-08-08 RX ADMIN — PREDNISONE 15 MG: 5 TABLET ORAL at 08:08

## 2018-08-08 RX ADMIN — TACROLIMUS 4 MG: 1 CAPSULE ORAL at 07:08

## 2018-08-08 RX ADMIN — FLUCONAZOLE 200 MG: 200 TABLET ORAL at 08:08

## 2018-08-08 RX ADMIN — INSULIN ASPART 4 UNITS: 100 INJECTION, SOLUTION INTRAVENOUS; SUBCUTANEOUS at 12:08

## 2018-08-08 RX ADMIN — HEPARIN SODIUM 5000 UNITS: 5000 INJECTION, SOLUTION INTRAVENOUS; SUBCUTANEOUS at 03:08

## 2018-08-08 RX ADMIN — MYCOPHENOLATE MOFETIL 1000 MG: 250 CAPSULE ORAL at 10:08

## 2018-08-08 RX ADMIN — PANTOPRAZOLE SODIUM 40 MG: 40 TABLET, DELAYED RELEASE ORAL at 09:08

## 2018-08-08 RX ADMIN — MYCOPHENOLATE MOFETIL 1000 MG: 250 CAPSULE ORAL at 09:08

## 2018-08-08 RX ADMIN — TRAMADOL HYDROCHLORIDE 50 MG: 50 TABLET, COATED ORAL at 05:08

## 2018-08-08 RX ADMIN — DOCUSATE SODIUM 100 MG: 100 CAPSULE, LIQUID FILLED ORAL at 08:08

## 2018-08-08 RX ADMIN — MICONAZOLE NITRATE: 20 OINTMENT TOPICAL at 08:08

## 2018-08-08 RX ADMIN — TACROLIMUS 4 MG: 1 CAPSULE ORAL at 05:08

## 2018-08-08 RX ADMIN — SULFAMETHOXAZOLE AND TRIMETHOPRIM 1 TABLET: 400; 80 TABLET ORAL at 05:08

## 2018-08-08 RX ADMIN — INSULIN ASPART 4 UNITS: 100 INJECTION, SOLUTION INTRAVENOUS; SUBCUTANEOUS at 05:08

## 2018-08-08 RX ADMIN — INSULIN ASPART 4 UNITS: 100 INJECTION, SOLUTION INTRAVENOUS; SUBCUTANEOUS at 08:08

## 2018-08-08 RX ADMIN — INSULIN ASPART 1 UNITS: 100 INJECTION, SOLUTION INTRAVENOUS; SUBCUTANEOUS at 09:08

## 2018-08-08 RX ADMIN — ACETAMINOPHEN 500 MG: 500 TABLET ORAL at 12:08

## 2018-08-08 RX ADMIN — OXYCODONE HYDROCHLORIDE 10 MG: 5 TABLET ORAL at 09:08

## 2018-08-08 RX ADMIN — ATORVASTATIN CALCIUM 40 MG: 20 TABLET, FILM COATED ORAL at 08:08

## 2018-08-08 NOTE — PT/OT/SLP PROGRESS
Occupational Therapy   Treatment    Name: Alon Adamson  MRN: [id number]  Admitting Diagnosis:  Liver transplanted  16 Days Post-Op    Recommendations:     Discharge Recommendations: nursing facility, skilled  Discharge Equipment Recommendations:  walker, rolling  Barriers to discharge:  Decreased caregiver support, Inaccessible home environment    Subjective     Communicated with: nsg prior to session.  Pain/Comfort:  · Pain Rating 1: 0/10    Patients cultural, spiritual, Mormonism conflicts given the current situation: none    Objective:     Patient found with: telemetry, central line    General Precautions: Standard, fall   Orthopedic Precautions:N/A   Braces:       Occupational Performance:    Bed Mobility:    · Sup to sit with min assist to assist RUE to grab rail  · Sit to sup with Spvn     Functional Mobility/Transfers:  · Sit to stand with mod assist x 2 with RW, stood x ~2-3 min, practiced wt shifts laterally  · Functional Mobility: 5 steps laterally along EOB with CGA x 2 for safety  · Sat EOB x ~15-20 min    Activities of Daily Living:  · UE dress with mod assist  · Socks with total assist    Patient left supine with call button in reach and mom present    [facility] 6 Click:  [facility] Total Score: 12    Treatment & Education:  Performed above activity, educated on POC, spent extended time educating pt on progress being made with mobility and strength,plans for progression of safety mobility and activity with therapists, discussing pt goals to get into a w/c to go outside and get increased BUE exercise with w/c propulsion and increased OOB, educated pt and mom on importance of sitting up EOB for all meals and as much as tolerated and cc with nsg re: pt min to spvn to sit up EOB and return to supine and encourage EOB as much as tolerated.   Education:    Assessment:     Alon Adamson is a 63 y.o. male with a medical diagnosis of Liver transplanted.  He presents with improving strength, mobility and  ADL's.  Performance deficits affecting function are weakness, impaired endurance, impaired self care skills, impaired functional mobilty, impaired balance, gait instability, decreased upper extremity function, decreased lower extremity function, edema, impaired cardiopulmonary response to activity.      Rehab Prognosis:  good; patient would benefit from acute skilled OT services to address these deficits and reach maximum level of function.       Plan:     Patient to be seen 4 x/week to address the above listed problems via self-care/home management, therapeutic activities, therapeutic exercises, neuromuscular re-education  · Plan of Care Expires: 08/26/18  · Plan of Care Reviewed with: patient, mother    This Plan of care has been discussed with the patient who was involved in its development and understands and is in agreement with the identified goals and treatment plan    GOALS:    Occupational Therapy Goals        Problem: Occupational Therapy Goal    Goal Priority Disciplines Outcome Interventions   Occupational Therapy Goal     OT, PT/OT Ongoing (interventions implemented as appropriate)    Description:  Goals to be met by: 8/10/18    Patient will increase functional independence with ADLs by performing:     Upper body dressing while seated EOB with SBA  Grooming while standing at sink with Stand-by Assistance  Toileting from bedside commode with Minimal Assistance for hygiene and clothing management.  Toilet transfer to bedside commode with Contact Guard Assistance.  Pt will complete a functional static standing activity x ~4 minutes with CGA.   LB Dressing with Moderate Assistance                            Time Tracking:     OT Date of Treatment: 08/08/18  OT Start Time: 1119  OT Stop Time: 1158  OT Total Time (min): 39 min    Billable Minutes:Self Care/Home Management 9 min  Therapeutic Activity 15 min  Therapeutic Exercise 15 min    Anni Roman OT  8/8/2018

## 2018-08-08 NOTE — PT/OT/SLP PROGRESS
Physical Therapy Treatment    Patient Name:  Alon Adamson   MRN:  [id number]    Recommendations:     Discharge Recommendations:  nursing facility, skilled   Discharge Equipment Recommendations:  (TBD)   Barriers to discharge: Inaccessible home and Decreased caregiver support    Assessment:     Alon Adamson is a 63 y.o. male admitted with a medical diagnosis of Liver transplanted.  He presents with the following impairments/functional limitations:  weakness, impaired functional mobilty, gait instability, impaired endurance, impaired balance, impaired self care skills, decreased lower extremity function, decreased upper extremity function, impaired skin, decreased coordination.  Pt tolerated session c c/o fatigue and fear of falling.  Pt required mod encouragement to participate c therapy on this date.  Performed bed mobility c min A - SBA, transfer c mod A, gait c CGA using RW.  Pt able to achieve standing transfer c bed in lowest position c mod A.  Pt demo good weight-shifting when taking steps along EOB.  Pt c 1x LOB during gait training but able to self correct.  Pt would benefit from continued skilled acute PT 4x/wk to improve functional mobility.    Pt safe to transfer to sitting EOB c assistance from family/nsg (min A). Encouraged to sit EOB at least 3x/day for breakfast, lunch and dinner.      Rehab Prognosis:  good; patient would benefit from acute skilled PT services to address these deficits and reach maximum level of function.      Recent Surgery: Procedure(s) (LRB):  TRANSPLANT, LIVER (N/A) 16 Days Post-Op    Plan:     During this hospitalization, patient to be seen 4 x/week to address the above listed problems via gait training, therapeutic activities, therapeutic exercises, neuromuscular re-education  · Plan of Care Expires:  08/26/18   Plan of Care Reviewed with: patient, mother    Subjective     Communicated with RN and OT prior to session.  Patient found HOB elevated and mother present  "upon PT entry to room, agreeable to treatment.      Chief Complaint: fatigue  Patient comments/goals: "I was hoping to get a little nap in before lunch." "I'm still scared of falling after that last episode."  Pain/Comfort:  · Pain Rating 1: 0/10    Patients cultural, spiritual, Tenriism conflicts given the current situation: none    Objective:     Patient found with: central line, telemetry     General Precautions: Standard, fall   Orthopedic Precautions:N/A   Braces: N/A     Functional Mobility:  · Bed Mobility:     · Rolling Right: minimum assistance  · Scooting: stand by assistance towards HOB in sitting EOB 5x  · SBA towards EOB in sitting 5x  · Bridging: stand by assistance 1x   · Supine to Sit: minimum assistance  · Sit to Supine: stand by assistance  · Transfers:     · Sit to Stand:  moderate assistance with rolling walker  · Performed 1x from bed in lowest position  · Gait: 5 lateral R steps along EOB c RW CGA  · Balance: standing (CGA)      AM-PAC 6 CLICK MOBILITY  Turning over in bed (including adjusting bedclothes, sheets and blankets)?: 3  Sitting down on and standing up from a chair with arms (e.g., wheelchair, bedside commode, etc.): 2  Moving from lying on back to sitting on the side of the bed?: 3  Moving to and from a bed to a chair (including a wheelchair)?: 3  Need to walk in hospital room?: 2  Climbing 3-5 steps with a railing?: 1  Basic Mobility Total Score: 14       Therapeutic Activities and Exercises:  Educated on PT role/POC; safety c mobility; benefits of OOB activities; sitting EOB at least 3x/day c assistance as needed; performing therex - v/u  Mobility as stated above  Sat EOB x15min - able to accept challenges  Therex (AP, LAQ, hip flex) 2x10ea  Static standing [id number]  Weight-shifting in standing (1x10 ea side)    Patient left HOB elevated with all lines intact, call button in reach, RN notified and mother present..    GOALS:    Physical Therapy Goals        Problem: Physical " Therapy Goal    Goal Priority Disciplines Outcome Goal Variances Interventions   Physical Therapy Goal     PT/OT, PT Ongoing (interventions implemented as appropriate)     Description:  Goals to be met by: 2018    Patient will increase functional independence with mobility by performin. Supine to sit with moderate assistance -  Met  Updated: Supine to sit with Supervision  2. Sit to stand with moderate assistance - met (2018)  Updated: Sit to stand with minimum assistance using rolling walker  3. Gait x 50ft with minimal assistance with RW. - not met  4. Pt will be able to perform hip flexion, hip abduction, and heel raises while standing with use of rolling walker (denoting improvement in balance)- Min A. Not met                                  Time Tracking:     PT Received On: 18  PT Start Time: 1119     PT Stop Time: 1158  PT Total Time (min): 39 min     Billable Minutes: Gait Training 12 mins, Therapeutic Activity 19 mins and Therapeutic Exercise 8 min    Treatment Type: Treatment  PT/PTA: PT     PTA Visit Number: 0     Mckinley Senior, PT  2018

## 2018-08-08 NOTE — PROGRESS NOTES
[facility]  Nephrology  Progress Note    Patient Name: Alon Adamson  MRN: [id number]  Admission Date: 6/22/2018  Hospital Length of Stay: 47 days  Attending Provider: Scott Hoffmann MD   Primary Care Physician: Mckinley Vides MD  Principal Problem:Liver transplanted    Subjective:     HPI: Mr. Adamson is 63 yr old male with decompensated alcoholic cirrhosis, CKD III and CAD admitted here on 06/22/18 admitted for bilateral LE hypervolemia and VICKY on CKD III with cr of 2.3 baseline around 1.5. Patient has multiple hospitalization in the past secondary to decompensated liver failure. Patient is currently on transplant team. During current admission patient was getting lasix and albumin intermittently for VICKY however renal function was not getting better. Patient hospital course complicated by hepatic encephalopathy with some improvement of mentation with lactulose. Also treated for cellulitis with 7 days of vancomycin.Nephrology is consulted for worsening/not improving VICKY despite lasix/albumin.     Per chart review patient has no hypotensive episodes, BP mostly above 100's. Has not received nephrotoxins such as NSAIDs/contrast media. No sever sepsis/septic shock or acute blood loss during current admit. UA with 2+ leukocytes and Hyaline casts, no wbc/rbc cast or proteinuria. Urine Na+ < 20.    Patient was transferred to ICU on 7/13/2018 after being more lethargic and hypoactive.  After two days was stepped down back to [facility].       Interval History: continues with excellent urine output, although not measured, BUN:Cr is unchanged from yesterday    Review of patient's allergies indicates:   Allergen Reactions    Nsaids (non-steroidal anti-inflammatory drug)      D/t to liver disease.    Penicillins Other (See Comments)     Tolerated pip-tazo in 8/2016 without issue  Tolerated cefepime 7/2018 without issue  Since childhood was told not to take it     Current Facility-Administered  Medications   Medication Frequency    0.9%  NaCl infusion PRN    acetaminophen tablet 500 mg Q6H PRN    acyclovir capsule 400 mg BID    atorvastatin tablet 40 mg Daily    dextrose 50% injection 12.5 g PRN    dextrose 50% injection 25 g PRN    docusate sodium capsule 100 mg Daily    epoetin dread injection 10,000 Units Every Tues, Thurs, Sat    ergocalciferol capsule 50,000 Units Q7 Days    fluconazole tablet 200 mg Daily    gelatin adsorbable 100cm top sponge 100 sponge 1 applicator PRN    glucagon (human recombinant) injection 1 mg PRN    glucose chewable tablet 16 g PRN    glucose chewable tablet 24 g PRN    heparin (porcine) injection 5,000 Units Q8H    insulin aspart U-100 pen 0-5 Units QID (AC + HS) PRN    insulin aspart U-100 pen 4 Units TIDWM    levETIRAcetam tablet 500 mg BID    miconazole nitrate 2% ointment BID    mycophenolate capsule 1,000 mg BID    ondansetron injection 4 mg Q8H PRN    pantoprazole EC tablet 40 mg BID    predniSONE tablet 15 mg Daily    ramelteon tablet 8 mg Nightly PRN    sodium chloride 0.9% flush 3 mL PRN    sulfamethoxazole-trimethoprim 400-80mg per tablet 1 tablet Every Mon, Wed, Fri    tacrolimus capsule 4 mg BID    traMADol tablet 50 mg Q6H PRN    triamcinolone acetonide 0.1% cream BID       Objective:     Vital Signs (Most Recent):  Temp: 97.8 °F (36.6 °C) (08/08/18 1234)  Pulse: 81 (08/08/18 1257)  Resp: 18 (08/08/18 1257)  BP: (!) 129/59 (08/08/18 1234)  SpO2: 96 % (08/08/18 1257)  O2 Device (Oxygen Therapy): room air (08/08/18 1257) Vital Signs (24h Range):  Temp:  [97.7 °F (36.5 °C)-98.2 °F (36.8 °C)] 97.8 °F (36.6 °C)  Pulse:  [57-81] 81  Resp:  [16-20] 18  SpO2:  [95 %-99 %] 96 %  BP: (117-158)/(56-76) 129/59     Weight: 130.6 kg (287 lb 14.7 oz) (08/08/18 0600)  Body mass index is 36.97 kg/m².  Body surface area is 2.61 meters squared.    I/O last 3 completed shifts:  In: 1600 [P.O.:1600]  Out: -     Physical Exam   HENT:   Head: Atraumatic.    Neck: No JVD present.   Cardiovascular: Normal rate and regular rhythm.  Exam reveals no friction rub.    Pulmonary/Chest: Effort normal. He has rales (at bases).   Abdominal: Soft. He exhibits no distension.   Musculoskeletal: He exhibits edema.   Neurological: He is alert.   Skin: Skin is warm.   Psychiatric: He has a normal mood and affect.           Assessment/Plan:     CKD (chronic kidney disease), stage III    with acute component and that has required dialysis, but now stabilizing (see VICKY section)        Acute kidney injury superimposed on chronic kidney disease    Started on RRT, today with increasing sCr, although acid/base and lytes are fine, he is still significantly volume overloaded    - continues to make urine and output may be increasing, although not adequately recorded (goes on the pad in the bed), BUN:Cr stable overnight    - continue holding RRT and reassess daily  -despite excellent urine output, noted with significant edema, bordering on anasarca, recommend lasix 100mg IV x 1 today  - strict Is & Os and serial RFPs  - Avoid nephrotoxic medications  - Hb > 7gm/dL            Thank you for your consult. I will follow-up with patient. Please contact us if you have any additional questions.    Elan Magana MD  Nephrology  [facility]    ATTENDING PHYSICIAN ATTESTATION  I have personally interviewed and examined the patient. I thoroughly reviewed the demographic, clinical, laboratorial and imaging information available in medical records. I agree with the assessment and recommendations provided by the subspecialty resident. Dr. Magana was under my supervision.

## 2018-08-08 NOTE — PLAN OF CARE
Problem: Physical Therapy Goal  Goal: Physical Therapy Goal  Goals to be met by: 2018    Patient will increase functional independence with mobility by performin. Supine to sit with moderate assistance -  Met  Updated: Supine to sit with Supervision  2. Sit to stand with moderate assistance - met (2018)  Updated: Sit to stand with minimum assistance using rolling walker  3. Gait x 50ft with minimal assistance with RW. - not met  4. Pt will be able to perform hip flexion, hip abduction, and heel raises while standing with use of rolling walker (denoting improvement in balance)- Min A. Not met                 Outcome: Ongoing (interventions implemented as appropriate)  Progressing    Mckinley Senior DPT  2018

## 2018-08-08 NOTE — ASSESSMENT & PLAN NOTE
- Continue intermittent dialysis.   - Tolerated HD well 8/1.  Last HD 8/3.   - Wrapped by wound care with significant improvement

## 2018-08-08 NOTE — ASSESSMENT & PLAN NOTE
- Opportunistic infection prophylaxis will include Acyclovir for 3 months (CMV D- R-), Bactrim for 6 months, and Fluconazole

## 2018-08-08 NOTE — ASSESSMENT & PLAN NOTE
- LFTs/enzymes normalized  - POD 1 U/s with elevated RIs  - Repeat liver U/S with stable, hyperechoic liver lesion, fluid collection inferior smaller, RIs persistently mildly elevated and mild to mod ascites.  Monitor.

## 2018-08-08 NOTE — SUBJECTIVE & OBJECTIVE
Interval History: continues with excellent urine output, although not measured, BUN:Cr is unchanged from yesterday    Review of patient's allergies indicates:   Allergen Reactions    Nsaids (non-steroidal anti-inflammatory drug)      D/t to liver disease.    Penicillins Other (See Comments)     Tolerated pip-tazo in 8/2016 without issue  Tolerated cefepime 7/2018 without issue  Since childhood was told not to take it     Current Facility-Administered Medications   Medication Frequency    0.9%  NaCl infusion PRN    acetaminophen tablet 500 mg Q6H PRN    acyclovir capsule 400 mg BID    atorvastatin tablet 40 mg Daily    dextrose 50% injection 12.5 g PRN    dextrose 50% injection 25 g PRN    docusate sodium capsule 100 mg Daily    epoetin dread injection 10,000 Units Every Tues, Thurs, Sat    ergocalciferol capsule 50,000 Units Q7 Days    fluconazole tablet 200 mg Daily    gelatin adsorbable 100cm top sponge 100 sponge 1 applicator PRN    glucagon (human recombinant) injection 1 mg PRN    glucose chewable tablet 16 g PRN    glucose chewable tablet 24 g PRN    heparin (porcine) injection 5,000 Units Q8H    insulin aspart U-100 pen 0-5 Units QID (AC + HS) PRN    insulin aspart U-100 pen 4 Units TIDWM    levETIRAcetam tablet 500 mg BID    miconazole nitrate 2% ointment BID    mycophenolate capsule 1,000 mg BID    ondansetron injection 4 mg Q8H PRN    pantoprazole EC tablet 40 mg BID    predniSONE tablet 15 mg Daily    ramelteon tablet 8 mg Nightly PRN    sodium chloride 0.9% flush 3 mL PRN    sulfamethoxazole-trimethoprim 400-80mg per tablet 1 tablet Every Mon, Wed, Fri    tacrolimus capsule 4 mg BID    traMADol tablet 50 mg Q6H PRN    triamcinolone acetonide 0.1% cream BID       Objective:     Vital Signs (Most Recent):  Temp: 97.8 °F (36.6 °C) (08/08/18 1234)  Pulse: 81 (08/08/18 1257)  Resp: 18 (08/08/18 1257)  BP: (!) 129/59 (08/08/18 1234)  SpO2: 96 % (08/08/18 1257)  O2 Device (Oxygen  Therapy): room air (08/08/18 1257) Vital Signs (24h Range):  Temp:  [97.7 °F (36.5 °C)-98.2 °F (36.8 °C)] 97.8 °F (36.6 °C)  Pulse:  [57-81] 81  Resp:  [16-20] 18  SpO2:  [95 %-99 %] 96 %  BP: (117-158)/(56-76) 129/59     Weight: 130.6 kg (287 lb 14.7 oz) (08/08/18 0600)  Body mass index is 36.97 kg/m².  Body surface area is 2.61 meters squared.    I/O last 3 completed shifts:  In: 1600 [P.O.:1600]  Out: -     Physical Exam   HENT:   Head: Atraumatic.   Neck: No JVD present.   Cardiovascular: Normal rate and regular rhythm.  Exam reveals no friction rub.    Pulmonary/Chest: Effort normal. He has rales (at bases).   Abdominal: Soft. He exhibits no distension.   Musculoskeletal: He exhibits edema.   Neurological: He is alert.   Skin: Skin is warm.   Psychiatric: He has a normal mood and affect.

## 2018-08-08 NOTE — ASSESSMENT & PLAN NOTE
- With CKD3  - Expected VICKY post txp  - UOP unclear as pt unable to measure urine (severe scrotal edema- improving) though feels UOP improving.  - Tolerated HD 8/1 and 8/3.   - Lasix given over weekend and seems to have helped UOP  - Delta Cr 0.2 8/6, 0.5 8/7.  Delta Cr 0 w increased UOP  -  Nephrology following and HD per their recs.

## 2018-08-08 NOTE — PROGRESS NOTES
[facility]  Liver Transplant  Progress Note    Patient Name: Alon Adamson  MRN: [id number]  Admission Date: 2018  Hospital Length of Stay: 47 days  Code Status: Full Code  Primary Care Provider: Mckinley Vides MD  Post-Operative Day: 16    ORGAN:   LIVER  Disease Etiology: Alcoholic Cirrhosis  Donor Type:    - Brain Death  CDC High Risk:   No  Donor CMV Status:   Donor CMV Status: Negative  Donor HBcAB:   Negative  Donor HCV Status:   Negative  Donor HBV TAMAR: Negative  Donor HCV TAMAR: Negative  Whole or Partial: Whole Liver  Biliary Anastomosis: End to End  Arterial Anatomy: Standard  Subjective:     History of Present Illness:  Mr. Adamson is a 64 yo M with PMH ESLD secondary to ETOH cirrhosis, CKD3, CAD.  Complications of liver disease include hyperbilirubinemia, hypoalbuminemia, severe malnutrition,   hepatic encephalopathy, thrombocytopenia, splenomegaly, ascites, hypervolemia, coagulopathy, portal HTN.  Pt admitted  for acutely worsening hepatic encephalopathy and concern for BLE cellulitis as well as severe volume overload.  Complications of ELSD managed while inpt and he received liver offer on 18.  Pt underwent liver transplant without complication: steroid induction, DBD, CMV -/-.  Donor with E coli bacteremia resistant to cipro placed on Rocephin x 2 weeks per ID recs.  Also on fluconazole x 30 days as pt very ill prior to transplant. Post transplant, with nice trend down of AST/ALT and LFTs.  Liver U/S with elevated RIs and decreased HAV.  During initial post operative period, pt required pressor support.  Pt with expected post operative VICKY as with VICKY prior to surgery.  Nephrology consulted and pt placed on intermittent CRRT.  Pressor requirement decreased and pt tolerated CRRT without pressors.  Pt with asymptomatic bradycardia while in ICU prompting cardiology consult. Pt without need for acute intervention but atropine placed at bedside.  HR has improved POD  7 and 8.  He was transferred to the TSU 7/30 PM.          Liver US 7/31 showed small fluid collection likely an adrenal hemorrhage vs hematoma. H&H decreased to 6.6/20.3 8/1- responded to 1 u PRBCs. H/H stable 8/2 AM. Dropped again 8/3 AM- 1 u PRBC given. H&H now stable. Iron studies checked and pt started on epo.     Hospital Course:  Interval history: No acute events overnight.  Slept better w starting Ramelteon. Last HD 8/3. Breathing stable, pulse ox 96-99%.  UOP 9 x in past 24 hours, encouraged patient to use urinal to measure UOP.  Cr same as yesterday at 3.9.  Continue to monitor closely for renal recovery.  LFTs nomalized.  Cont to encourage up in chair.  Cont aggressive PT.      Scheduled Meds:   acyclovir  400 mg Oral BID    atorvastatin  40 mg Oral Daily    docusate sodium  100 mg Oral Daily    epoetin dread  10,000 Units Intravenous Every Tues, Thurs, Sat    ergocalciferol  50,000 Units Oral Q7 Days    fluconazole  200 mg Oral Daily    heparin (porcine)  5,000 Units Subcutaneous Q8H    insulin aspart U-100  4 Units Subcutaneous TIDWM    levETIRAcetam  500 mg Oral BID    miconazole nitrate 2%   Topical (Top) BID    mycophenolate  1,000 mg Oral BID    pantoprazole  40 mg Oral BID    predniSONE  15 mg Oral Daily    sulfamethoxazole-trimethoprim 400-80mg  1 tablet Oral Every Mon, Wed, Fri    tacrolimus  4 mg Oral BID    triamcinolone acetonide 0.1%   Topical (Top) BID     Continuous Infusions:  PRN Meds:sodium chloride 0.9%, acetaminophen, dextrose 50%, dextrose 50%, gelatin adsorbable 100cm top sponge, glucagon (human recombinant), glucose, glucose, insulin aspart U-100, ondansetron, ramelteon, sodium chloride 0.9%, traMADol    Review of Systems   Constitutional: Positive for activity change, appetite change and fatigue. Negative for chills, diaphoresis and fever.   HENT: Negative for congestion and facial swelling.    Eyes: Negative for pain, discharge and visual disturbance.   Respiratory:  Negative for cough, chest tightness, shortness of breath and wheezing.    Cardiovascular: Positive for leg swelling. Negative for chest pain and palpitations.   Gastrointestinal: Positive for abdominal distention. Negative for abdominal pain, constipation, diarrhea, nausea and vomiting.   Endocrine: Negative.    Genitourinary: Negative for decreased urine volume (Improving- unable to measure).   Musculoskeletal: Negative for back pain.   Skin: Positive for wound.   Allergic/Immunologic: Positive for immunocompromised state.   Neurological: Positive for weakness. Negative for dizziness, tremors and headaches.   Hematological: Negative.    Psychiatric/Behavioral: Negative for agitation, dysphoric mood and sleep disturbance. The patient is not nervous/anxious.      Objective:     Vital Signs (Most Recent):  Temp: 97.8 °F (36.6 °C) (08/08/18 1234)  Pulse: 81 (08/08/18 1257)  Resp: 18 (08/08/18 1257)  BP: (!) 129/59 (08/08/18 1234)  SpO2: 96 % (08/08/18 1257) Vital Signs (24h Range):  Temp:  [97.7 °F (36.5 °C)-98.2 °F (36.8 °C)] 97.8 °F (36.6 °C)  Pulse:  [57-81] 81  Resp:  [16-20] 18  SpO2:  [95 %-99 %] 96 %  BP: (117-158)/(56-76) 129/59     Weight: 130.6 kg (287 lb 14.7 oz)  Body mass index is 36.97 kg/m².    Intake/Output - Last 3 Shifts       08/06 0700 - 08/07 0659 08/07 0700 - 08/08 0659 08/08 0700 - 08/09 0659    P.O. 1180 1260 200    I.V. (mL/kg)   0 (0)    Other   0    Total Intake(mL/kg) 1180 (9.1) 1260 (9.6) 200 (1.5)    Urine (mL/kg/hr)   0 (0)    Emesis/NG output   0 (0)    Other   0 (0)    Stool   0 (0)    Blood   0 (0)    Total Output     0    Net +1180 +1260 +200           Urine Occurrence 13 x 9 x 1 x    Stool Occurrence 1 x 0 x 1 x    Emesis Occurrence 0 x  0 x          Physical Exam   Constitutional: He is oriented to person, place, and time. He appears well-developed. No distress.   Temporal and distal extremity muscle wasting   HENT:   Head: Normocephalic and atraumatic.   Eyes: EOM are normal.  Pupils are equal, round, and reactive to light. No scleral icterus.   Neck: Normal range of motion.   Cardiovascular: Normal rate, regular rhythm and normal heart sounds.    Pulmonary/Chest: Effort normal. No respiratory distress. He has no wheezes.   Coarse BS   Abdominal: Soft. Bowel sounds are normal. He exhibits distension (ascites). There is no tenderness.   Chevron inc cdi no ssi   Musculoskeletal: He exhibits edema (3+ BLE).   Neurological: He is alert and oriented to person, place, and time.   Skin: Skin is warm and dry. Capillary refill takes 2 to 3 seconds. He is not diaphoretic.   Psychiatric: He has a normal mood and affect. His behavior is normal. Judgment and thought content normal.   Nursing note and vitals reviewed.      Laboratory:  Immunosuppressants         Stop Route Frequency     tacrolimus capsule 4 mg      -- Oral 2 times daily     mycophenolate capsule 1,000 mg      -- Oral 2 times daily        CBC:     Recent Labs  Lab 08/08/18  0530   WBC 4.76   RBC 2.59*   HGB 8.3*   HCT 25.2*   PLT 74*   MCV 97   MCH 32.0*   MCHC 32.9     CMP:     Recent Labs  Lab 08/08/18  0530   *   CALCIUM 8.2*   ALBUMIN 2.6*   PROT 4.3*   *   K 4.7   CO2 22*      BUN 49*   CREATININE 3.9*   ALKPHOS 64   ALT 18   AST 12   BILITOT 0.5     Coagulation:   No results for input(s): PT, INR, APTT in the last 168 hours.    Labs within the past 24 hours have been reviewed.    Diagnostic Results:  None    Assessment/Plan:     * Liver transplanted    - LFTs/enzymes normalized  - POD 1 U/s with elevated RIs  - Repeat liver U/S with stable, hyperechoic liver lesion, fluid collection inferior smaller, RIs persistently mildly elevated and mild to mod ascites.  Monitor.          Long-term use of immunosuppressant medication    - see prophylactic immuno.           Prophylactic immunotherapy    - continue prograf, cellcept and prednisone. Monitor labs daily and adjust dose accordingly for a therapeutic level.            At risk for opportunistic infections    - Opportunistic infection prophylaxis will include Acyclovir for 3 months (CMV D- R-), Bactrim for 6 months, and Fluconazole          Sinus bradycardia    - Improved  - Atropine at bedside if HR decreases again          Steroid-induced hyperglycemia    - Endocrine consulted          CKD (chronic kidney disease), stage III    - See VICKY.          Stage II pressure ulcer of sacral region    - Wound care following          Severe protein-calorie malnutrition    - Dietary consulted.  - Continue supplements.  - Pt eating well.        Physical deconditioning    - Improvement noted with PT/OT  - Will place SNF consult once medically ready        Pancytopenia    - Related to decompensated liver disease --> improving now that pt transplanted.        Alteration in skin integrity              Acute kidney injury superimposed on chronic kidney disease    - With CKD3  - Expected VICKY post txp  - UOP unclear as pt unable to measure urine (severe scrotal edema- improving) though feels UOP improving.  - Tolerated HD 8/1 and 8/3.   - Lasix given over weekend and seems to have helped UOP  - Delta Cr 0.2 8/6, 0.5 8/7.  Delta Cr 0 w increased UOP  -  Nephrology following and HD per their recs.         Coronary artery disease involving native coronary artery of native heart without angina pectoris    - Continue home ASA.   - 2D Echo 7/9 normal EF (55-60%), trivial tricuspid/mitral regurg.        Anemia of chronic disease    - H&H 6.6/20.3 8/1: Transfused 1 unit PRBCs  - H/H 6.4/19.7: Transfused 1 u PRBC  - Epo started on 8/3  - PRBC 1 unit given 8/5  - H/H stable        Thrombocytopenia    - Secondary to splenomegaly from portal HTN- improving post transplant        Hypoalbuminemia due to protein-calorie malnutrition              Seizures    - Continue oral Keppra.  - Keep Mg > 2.        Bilateral edema of lower extremity    - Continue intermittent dialysis.   - Tolerated HD well 8/1.  Last HD  8/3.   - Wrapped by wound care with significant improvement        Hepatic encephalopathy    - ESLD secondary to EtOH cirrhosis with severe complications (hyperbilirubinemia, hypoalbuminemia, severe malnutrition, hepatic encephalopathy, thrombocytopenia, splenomegaly, ascites, hypervolemia, coaguloopathy, portal HTN), seizure disorder on Keppra, CKD3, CAD   - PRIOR to transplant- had frequent episodes of lethargy/confusion/slowing when holding lactulose   - admitted to SICU 7/13/18 for worsening encephalopathy and hypothermia  - PO lactulose TID, PRN lactulose enemas, Rifaximin  - Now s/p liver transplant- mental status improving daily.              VTE Risk Mitigation         Ordered     heparin (porcine) injection 5,000 Units  Every 8 hours      07/26/18 1330     Place sequential compression device  Until discontinued      07/23/18 1326     IP VTE HIGH RISK PATIENT  Once      07/23/18 1242          The patients clinical status was discussed at multidisplinary rounds, involving transplant surgery, transplant medicine, pharmacy, nursing, nutrition, and social work    Discharge Planning:  No plan for discharge    Ana Paula Cleary NP  Liver Transplant  [facility]

## 2018-08-08 NOTE — ASSESSMENT & PLAN NOTE
Started on RRT, today with increasing sCr, although acid/base and lytes are fine, he is still significantly volume overloaded    - continues to make urine and output may be increasing, although not adequately recorded (goes on the pad in the bed), BUN:Cr stable overnight    - continue holding RRT and reassess daily  - strict Is & Os and serial RFPs  - Avoid nephrotoxic medications  - Hb > 7gm/dL

## 2018-08-08 NOTE — PLAN OF CARE
Reviewed insulin regimen and CBG.     Novolog 4 units qAC to take effect today.    No changes to current regimen today.     Will continue to follow along; please call with any questions.

## 2018-08-08 NOTE — ASSESSMENT & PLAN NOTE
- H&H 6.6/20.3 8/1: Transfused 1 unit PRBCs  - H/H 6.4/19.7: Transfused 1 u PRBC  - Epo started on 8/3  - PRBC 1 unit given 8/5  - H/H stable

## 2018-08-08 NOTE — ASSESSMENT & PLAN NOTE
- ESLD secondary to EtOH cirrhosis with severe complications (hyperbilirubinemia, hypoalbuminemia, severe malnutrition, hepatic encephalopathy, thrombocytopenia, splenomegaly, ascites, hypervolemia, coaguloopathy, portal HTN), seizure disorder on Keppra, CKD3, CAD   - PRIOR to transplant- had frequent episodes of lethargy/confusion/slowing when holding lactulose   - admitted to SICU 7/13/18 for worsening encephalopathy and hypothermia  - PO lactulose TID, PRN lactulose enemas, Rifaximin  - Now s/p liver transplant- mental status improving daily.

## 2018-08-08 NOTE — SUBJECTIVE & OBJECTIVE
Scheduled Meds:   acyclovir  400 mg Oral BID    atorvastatin  40 mg Oral Daily    docusate sodium  100 mg Oral Daily    epoetin dread  10,000 Units Intravenous Every Tues, Thurs, Sat    ergocalciferol  50,000 Units Oral Q7 Days    fluconazole  200 mg Oral Daily    heparin (porcine)  5,000 Units Subcutaneous Q8H    insulin aspart U-100  4 Units Subcutaneous TIDWM    levETIRAcetam  500 mg Oral BID    miconazole nitrate 2%   Topical (Top) BID    mycophenolate  1,000 mg Oral BID    pantoprazole  40 mg Oral BID    predniSONE  15 mg Oral Daily    sulfamethoxazole-trimethoprim 400-80mg  1 tablet Oral Every Mon, Wed, Fri    tacrolimus  4 mg Oral BID    triamcinolone acetonide 0.1%   Topical (Top) BID     Continuous Infusions:  PRN Meds:sodium chloride 0.9%, acetaminophen, dextrose 50%, dextrose 50%, gelatin adsorbable 100cm top sponge, glucagon (human recombinant), glucose, glucose, insulin aspart U-100, ondansetron, ramelteon, sodium chloride 0.9%, traMADol    Review of Systems   Constitutional: Positive for activity change, appetite change and fatigue. Negative for chills, diaphoresis and fever.   HENT: Negative for congestion and facial swelling.    Eyes: Negative for pain, discharge and visual disturbance.   Respiratory: Negative for cough, chest tightness, shortness of breath and wheezing.    Cardiovascular: Positive for leg swelling. Negative for chest pain and palpitations.   Gastrointestinal: Positive for abdominal distention. Negative for abdominal pain, constipation, diarrhea, nausea and vomiting.   Endocrine: Negative.    Genitourinary: Negative for decreased urine volume (Improving- unable to measure).   Musculoskeletal: Negative for back pain.   Skin: Positive for wound.   Allergic/Immunologic: Positive for immunocompromised state.   Neurological: Positive for weakness. Negative for dizziness, tremors and headaches.   Hematological: Negative.    Psychiatric/Behavioral: Negative for agitation,  dysphoric mood and sleep disturbance. The patient is not nervous/anxious.      Objective:     Vital Signs (Most Recent):  Temp: 97.8 °F (36.6 °C) (08/08/18 1234)  Pulse: 81 (08/08/18 1257)  Resp: 18 (08/08/18 1257)  BP: (!) 129/59 (08/08/18 1234)  SpO2: 96 % (08/08/18 1257) Vital Signs (24h Range):  Temp:  [97.7 °F (36.5 °C)-98.2 °F (36.8 °C)] 97.8 °F (36.6 °C)  Pulse:  [57-81] 81  Resp:  [16-20] 18  SpO2:  [95 %-99 %] 96 %  BP: (117-158)/(56-76) 129/59     Weight: 130.6 kg (287 lb 14.7 oz)  Body mass index is 36.97 kg/m².    Intake/Output - Last 3 Shifts       08/06 0700 - 08/07 0659 08/07 0700 - 08/08 0659 08/08 0700 - 08/09 0659    P.O. 1180 1260 200    I.V. (mL/kg)   0 (0)    Other   0    Total Intake(mL/kg) 1180 (9.1) 1260 (9.6) 200 (1.5)    Urine (mL/kg/hr)   0 (0)    Emesis/NG output   0 (0)    Other   0 (0)    Stool   0 (0)    Blood   0 (0)    Total Output     0    Net +1180 +1260 +200           Urine Occurrence 13 x 9 x 1 x    Stool Occurrence 1 x 0 x 1 x    Emesis Occurrence 0 x  0 x          Physical Exam   Constitutional: He is oriented to person, place, and time. He appears well-developed. No distress.   Temporal and distal extremity muscle wasting   HENT:   Head: Normocephalic and atraumatic.   Eyes: EOM are normal. Pupils are equal, round, and reactive to light. No scleral icterus.   Neck: Normal range of motion.   Cardiovascular: Normal rate, regular rhythm and normal heart sounds.    Pulmonary/Chest: Effort normal. No respiratory distress. He has no wheezes.   Coarse BS   Abdominal: Soft. Bowel sounds are normal. He exhibits distension (ascites). There is no tenderness.   Chevron inc cdi no ssi   Musculoskeletal: He exhibits edema (3+ BLE).   Neurological: He is alert and oriented to person, place, and time.   Skin: Skin is warm and dry. Capillary refill takes 2 to 3 seconds. He is not diaphoretic.   Psychiatric: He has a normal mood and affect. His behavior is normal. Judgment and thought content  normal.   Nursing note and vitals reviewed.      Laboratory:  Immunosuppressants         Stop Route Frequency     tacrolimus capsule 4 mg      -- Oral 2 times daily     mycophenolate capsule 1,000 mg      -- Oral 2 times daily        CBC:     Recent Labs  Lab 08/08/18  0530   WBC 4.76   RBC 2.59*   HGB 8.3*   HCT 25.2*   PLT 74*   MCV 97   MCH 32.0*   MCHC 32.9     CMP:     Recent Labs  Lab 08/08/18  0530   *   CALCIUM 8.2*   ALBUMIN 2.6*   PROT 4.3*   *   K 4.7   CO2 22*      BUN 49*   CREATININE 3.9*   ALKPHOS 64   ALT 18   AST 12   BILITOT 0.5     Coagulation:   No results for input(s): PT, INR, APTT in the last 168 hours.    Labs within the past 24 hours have been reviewed.    Diagnostic Results:  None

## 2018-08-09 PROBLEM — R11.2 NAUSEA AND VOMITING: Status: RESOLVED | Noted: 2018-07-21 | Resolved: 2018-08-09

## 2018-08-09 PROBLEM — K72.90 DECOMPENSATED HEPATIC CIRRHOSIS: Status: RESOLVED | Noted: 2018-01-21 | Resolved: 2018-08-09

## 2018-08-09 PROBLEM — K74.60 DECOMPENSATED HEPATIC CIRRHOSIS: Status: RESOLVED | Noted: 2018-01-21 | Resolved: 2018-08-09

## 2018-08-09 LAB
ABO + RH BLD: NORMAL
ALBUMIN SERPL BCP-MCNC: 2.5 G/DL
ALP SERPL-CCNC: 62 U/L
ALT SERPL W/O P-5'-P-CCNC: 17 U/L
ANION GAP SERPL CALC-SCNC: 8 MMOL/L
AST SERPL-CCNC: 10 U/L
BASOPHILS # BLD AUTO: 0.04 K/UL
BASOPHILS NFR BLD: 0.8 %
BILIRUB SERPL-MCNC: 0.5 MG/DL
BLD GP AB SCN CELLS X3 SERPL QL: NORMAL
BUN SERPL-MCNC: 51 MG/DL
CALCIUM SERPL-MCNC: 8.5 MG/DL
CHLORIDE SERPL-SCNC: 104 MMOL/L
CO2 SERPL-SCNC: 21 MMOL/L
CREAT SERPL-MCNC: 4 MG/DL
DIFFERENTIAL METHOD: ABNORMAL
EOSINOPHIL # BLD AUTO: 0 K/UL
EOSINOPHIL NFR BLD: 0.6 %
ERYTHROCYTE [DISTWIDTH] IN BLOOD BY AUTOMATED COUNT: 17.3 %
EST. GFR  (AFRICAN AMERICAN): 17.4 ML/MIN/1.73 M^2
EST. GFR  (NON AFRICAN AMERICAN): 15 ML/MIN/1.73 M^2
GLUCOSE SERPL-MCNC: 126 MG/DL
HCT VFR BLD AUTO: 25.8 %
HGB BLD-MCNC: 8.3 G/DL
IMM GRANULOCYTES # BLD AUTO: 0.04 K/UL
IMM GRANULOCYTES NFR BLD AUTO: 0.8 %
LYMPHOCYTES # BLD AUTO: 0.5 K/UL
LYMPHOCYTES NFR BLD: 10 %
MAGNESIUM SERPL-MCNC: 1.4 MG/DL
MCH RBC QN AUTO: 31.7 PG
MCHC RBC AUTO-ENTMCNC: 32.2 G/DL
MCV RBC AUTO: 99 FL
MONOCYTES # BLD AUTO: 0.6 K/UL
MONOCYTES NFR BLD: 11.5 %
NEUTROPHILS # BLD AUTO: 3.6 K/UL
NEUTROPHILS NFR BLD: 76.3 %
NRBC BLD-RTO: 0 /100 WBC
PHOSPHATE SERPL-MCNC: 4.6 MG/DL
PLATELET # BLD AUTO: 89 K/UL
PMV BLD AUTO: 9.9 FL
POCT GLUCOSE: 108 MG/DL (ref 70–110)
POCT GLUCOSE: 173 MG/DL (ref 70–110)
POCT GLUCOSE: 181 MG/DL (ref 70–110)
POTASSIUM SERPL-SCNC: 4.7 MMOL/L
PROT SERPL-MCNC: 4.3 G/DL
RBC # BLD AUTO: 2.62 M/UL
SODIUM SERPL-SCNC: 133 MMOL/L
TACROLIMUS BLD-MCNC: 7.4 NG/ML
WBC # BLD AUTO: 4.78 K/UL

## 2018-08-09 PROCEDURE — 25000003 PHARM REV CODE 250: Performed by: STUDENT IN AN ORGANIZED HEALTH CARE EDUCATION/TRAINING PROGRAM

## 2018-08-09 PROCEDURE — 99232 SBSQ HOSP IP/OBS MODERATE 35: CPT | Mod: GC,,, | Performed by: INTERNAL MEDICINE

## 2018-08-09 PROCEDURE — 63600175 PHARM REV CODE 636 W HCPCS: Performed by: NURSE PRACTITIONER

## 2018-08-09 PROCEDURE — 99233 SBSQ HOSP IP/OBS HIGH 50: CPT | Mod: 24,,, | Performed by: NURSE PRACTITIONER

## 2018-08-09 PROCEDURE — 80053 COMPREHEN METABOLIC PANEL: CPT

## 2018-08-09 PROCEDURE — 25000003 PHARM REV CODE 250: Performed by: NURSE PRACTITIONER

## 2018-08-09 PROCEDURE — 84100 ASSAY OF PHOSPHORUS: CPT

## 2018-08-09 PROCEDURE — 97535 SELF CARE MNGMENT TRAINING: CPT

## 2018-08-09 PROCEDURE — 25000003 PHARM REV CODE 250: Performed by: PHYSICIAN ASSISTANT

## 2018-08-09 PROCEDURE — 83735 ASSAY OF MAGNESIUM: CPT

## 2018-08-09 PROCEDURE — 97110 THERAPEUTIC EXERCISES: CPT

## 2018-08-09 PROCEDURE — 63600175 PHARM REV CODE 636 W HCPCS: Performed by: SURGERY

## 2018-08-09 PROCEDURE — 63600175 PHARM REV CODE 636 W HCPCS: Performed by: STUDENT IN AN ORGANIZED HEALTH CARE EDUCATION/TRAINING PROGRAM

## 2018-08-09 PROCEDURE — 97530 THERAPEUTIC ACTIVITIES: CPT

## 2018-08-09 PROCEDURE — 94761 N-INVAS EAR/PLS OXIMETRY MLT: CPT

## 2018-08-09 PROCEDURE — 99900035 HC TECH TIME PER 15 MIN (STAT)

## 2018-08-09 PROCEDURE — 25000003 PHARM REV CODE 250: Performed by: SURGERY

## 2018-08-09 PROCEDURE — 86901 BLOOD TYPING SEROLOGIC RH(D): CPT

## 2018-08-09 PROCEDURE — 85025 COMPLETE CBC W/AUTO DIFF WBC: CPT

## 2018-08-09 PROCEDURE — 20600001 HC STEP DOWN PRIVATE ROOM

## 2018-08-09 PROCEDURE — 80197 ASSAY OF TACROLIMUS: CPT

## 2018-08-09 RX ORDER — FUROSEMIDE 10 MG/ML
120 INJECTION INTRAMUSCULAR; INTRAVENOUS ONCE
Status: COMPLETED | OUTPATIENT
Start: 2018-08-09 | End: 2018-08-09

## 2018-08-09 RX ORDER — LANOLIN ALCOHOL/MO/W.PET/CERES
800 CREAM (GRAM) TOPICAL 2 TIMES DAILY
Status: COMPLETED | OUTPATIENT
Start: 2018-08-09 | End: 2018-08-09

## 2018-08-09 RX ORDER — PREDNISONE 10 MG/1
10 TABLET ORAL DAILY
Status: DISCONTINUED | OUTPATIENT
Start: 2018-08-12 | End: 2018-08-17 | Stop reason: HOSPADM

## 2018-08-09 RX ORDER — INSULIN ASPART 100 [IU]/ML
6 INJECTION, SOLUTION INTRAVENOUS; SUBCUTANEOUS
Status: DISCONTINUED | OUTPATIENT
Start: 2018-08-09 | End: 2018-08-10

## 2018-08-09 RX ADMIN — MAGNESIUM OXIDE TAB 400 MG (241.3 MG ELEMENTAL MG) 800 MG: 400 (241.3 MG) TAB at 09:08

## 2018-08-09 RX ADMIN — MICONAZOLE NITRATE: 20 OINTMENT TOPICAL at 09:08

## 2018-08-09 RX ADMIN — SIMETHICONE CHEW TAB 80 MG 80 MG: 80 TABLET ORAL at 09:08

## 2018-08-09 RX ADMIN — SIMETHICONE CHEW TAB 80 MG 80 MG: 80 TABLET ORAL at 08:08

## 2018-08-09 RX ADMIN — ERGOCALCIFEROL 50000 UNITS: 1.25 CAPSULE ORAL at 08:08

## 2018-08-09 RX ADMIN — FUROSEMIDE 120 MG: 10 INJECTION, SOLUTION INTRAMUSCULAR; INTRAVENOUS at 12:08

## 2018-08-09 RX ADMIN — MICONAZOLE NITRATE: 20 OINTMENT TOPICAL at 08:08

## 2018-08-09 RX ADMIN — PANTOPRAZOLE SODIUM 40 MG: 40 TABLET, DELAYED RELEASE ORAL at 09:08

## 2018-08-09 RX ADMIN — HEPARIN SODIUM 5000 UNITS: 5000 INJECTION, SOLUTION INTRAVENOUS; SUBCUTANEOUS at 09:08

## 2018-08-09 RX ADMIN — DOCUSATE SODIUM 100 MG: 100 CAPSULE, LIQUID FILLED ORAL at 08:08

## 2018-08-09 RX ADMIN — LEVETIRACETAM 500 MG: 500 TABLET ORAL at 08:08

## 2018-08-09 RX ADMIN — TACROLIMUS 4 MG: 1 CAPSULE ORAL at 05:08

## 2018-08-09 RX ADMIN — ONDANSETRON 4 MG: 2 INJECTION INTRAMUSCULAR; INTRAVENOUS at 05:08

## 2018-08-09 RX ADMIN — INSULIN ASPART 6 UNITS: 100 INJECTION, SOLUTION INTRAVENOUS; SUBCUTANEOUS at 12:08

## 2018-08-09 RX ADMIN — TRAMADOL HYDROCHLORIDE 50 MG: 50 TABLET, COATED ORAL at 12:08

## 2018-08-09 RX ADMIN — MYCOPHENOLATE MOFETIL 1000 MG: 250 CAPSULE ORAL at 09:08

## 2018-08-09 RX ADMIN — MYCOPHENOLATE MOFETIL 1000 MG: 250 CAPSULE ORAL at 08:08

## 2018-08-09 RX ADMIN — HEPARIN SODIUM 5000 UNITS: 5000 INJECTION, SOLUTION INTRAVENOUS; SUBCUTANEOUS at 05:08

## 2018-08-09 RX ADMIN — INSULIN ASPART 4 UNITS: 100 INJECTION, SOLUTION INTRAVENOUS; SUBCUTANEOUS at 08:08

## 2018-08-09 RX ADMIN — INSULIN ASPART 6 UNITS: 100 INJECTION, SOLUTION INTRAVENOUS; SUBCUTANEOUS at 05:08

## 2018-08-09 RX ADMIN — ACYCLOVIR 400 MG: 200 CAPSULE ORAL at 09:08

## 2018-08-09 RX ADMIN — PREDNISONE 15 MG: 5 TABLET ORAL at 08:08

## 2018-08-09 RX ADMIN — TACROLIMUS 4 MG: 1 CAPSULE ORAL at 08:08

## 2018-08-09 RX ADMIN — PANTOPRAZOLE SODIUM 40 MG: 40 TABLET, DELAYED RELEASE ORAL at 08:08

## 2018-08-09 RX ADMIN — ATORVASTATIN CALCIUM 40 MG: 20 TABLET, FILM COATED ORAL at 08:08

## 2018-08-09 RX ADMIN — ACYCLOVIR 400 MG: 200 CAPSULE ORAL at 08:08

## 2018-08-09 RX ADMIN — LEVETIRACETAM 500 MG: 500 TABLET ORAL at 09:08

## 2018-08-09 RX ADMIN — SIMETHICONE CHEW TAB 80 MG 80 MG: 80 TABLET ORAL at 05:08

## 2018-08-09 RX ADMIN — HEPARIN SODIUM 5000 UNITS: 5000 INJECTION, SOLUTION INTRAVENOUS; SUBCUTANEOUS at 02:08

## 2018-08-09 RX ADMIN — FLUCONAZOLE 200 MG: 200 TABLET ORAL at 08:08

## 2018-08-09 NOTE — ASSESSMENT & PLAN NOTE
- Placed status 7 on 6/26 for prescription drug coverage change. Re-activated 7/3. Back on hold d/t frailty and decompensation at the end of last week prompting ICU transfer. Now off hold 7/17.  Plan to submit MELD exception points. Will remeld today at 27.       MELD-Na score: 22 at 8/2/2018  4:30 AM  MELD score: 18 at 8/2/2018  4:30 AM  Calculated from:  Serum Creatinine: 2.8 mg/dL at 8/2/2018  4:30 AM  Serum Sodium: 132 mmol/L at 8/2/2018  4:30 AM  Total Bilirubin: 0.7 mg/dL (Rounded to 1) at 8/2/2018  4:30 AM  INR(ratio): 1.2 at 7/31/2018  5:00 AM  Age: 63 years

## 2018-08-09 NOTE — SUBJECTIVE & OBJECTIVE
Scheduled Meds:   acyclovir  400 mg Oral BID    atorvastatin  40 mg Oral Daily    docusate sodium  100 mg Oral Daily    epoetin dread  10,000 Units Intravenous Every Tues, Thurs, Sat    ergocalciferol  50,000 Units Oral Q7 Days    fluconazole  200 mg Oral Daily    heparin (porcine)  5,000 Units Subcutaneous Q8H    insulin aspart U-100  6 Units Subcutaneous TIDWM    levETIRAcetam  500 mg Oral BID    magnesium oxide  800 mg Oral BID    miconazole nitrate 2%   Topical (Top) BID    mycophenolate  1,000 mg Oral BID    pantoprazole  40 mg Oral BID    [START ON 8/12/2018] predniSONE  10 mg Oral Daily    [START ON 8/10/2018] predniSONE  15 mg Oral Daily    sulfamethoxazole-trimethoprim 400-80mg  1 tablet Oral Every Mon, Wed, Fri    tacrolimus  4 mg Oral BID    triamcinolone acetonide 0.1%   Topical (Top) BID     Continuous Infusions:  PRN Meds:sodium chloride 0.9%, acetaminophen, dextrose 50%, dextrose 50%, gelatin adsorbable 100cm top sponge, glucagon (human recombinant), glucose, glucose, insulin aspart U-100, ondansetron, oxyCODONE, ramelteon, simethicone, sodium chloride 0.9%, traMADol    Review of Systems   Constitutional: Positive for activity change, appetite change and fatigue. Negative for chills, diaphoresis and fever.   HENT: Negative for congestion and facial swelling.    Eyes: Negative for pain, discharge and visual disturbance.   Respiratory: Negative for cough, chest tightness, shortness of breath and wheezing.    Cardiovascular: Positive for leg swelling. Negative for chest pain and palpitations.   Gastrointestinal: Positive for abdominal distention. Negative for abdominal pain, constipation, diarrhea, nausea and vomiting.   Endocrine: Negative.    Genitourinary: Negative for decreased urine volume (Improving- unable to measure).   Musculoskeletal: Negative for back pain.   Skin: Positive for wound.   Allergic/Immunologic: Positive for immunocompromised state.   Neurological: Positive for  weakness. Negative for dizziness, tremors and headaches.   Hematological: Negative.    Psychiatric/Behavioral: Negative for agitation, dysphoric mood and sleep disturbance. The patient is not nervous/anxious.      Objective:     Vital Signs (Most Recent):  Temp: 98.2 °F (36.8 °C) (08/09/18 1146)  Pulse: 92 (08/09/18 1457)  Resp: 16 (08/09/18 1319)  BP: (!) 152/65 (08/09/18 1146)  SpO2: 95 % (08/09/18 1319) Vital Signs (24h Range):  Temp:  [97.9 °F (36.6 °C)-98.4 °F (36.9 °C)] 98.2 °F (36.8 °C)  Pulse:  [73-92] 92  Resp:  [16-18] 16  SpO2:  [95 %-97 %] 95 %  BP: (130-159)/(58-72) 152/65     Weight: 131.9 kg (290 lb 12.6 oz)  Body mass index is 37.33 kg/m².    Intake/Output - Last 3 Shifts       08/07 0700 - 08/08 0659 08/08 0700 - 08/09 0659 08/09 0700 - 08/10 0659    P.O. 1260 1560     I.V. (mL/kg)  0 (0)     Other  0     Total Intake(mL/kg) 1260 (9.6) 1560 (11.8)     Urine (mL/kg/hr)  0 (0)     Emesis/NG output  0 (0)     Other  0 (0)     Stool  0 (0)     Blood  0 (0)     Total Output   0      Net +1260 +1560             Urine Occurrence 9 x 11 x     Stool Occurrence 0 x 1 x     Emesis Occurrence  0 x           Physical Exam   Constitutional: He is oriented to person, place, and time. He appears well-developed. No distress.   Temporal and distal extremity muscle wasting   HENT:   Head: Normocephalic and atraumatic.   Eyes: EOM are normal. Pupils are equal, round, and reactive to light. No scleral icterus.   Neck: Normal range of motion.   Cardiovascular: Normal rate, regular rhythm and normal heart sounds.    Pulmonary/Chest: Effort normal. No respiratory distress. He has no wheezes.   Coarse BS   Abdominal: Soft. Bowel sounds are normal. He exhibits distension (ascites). There is no tenderness.   Chevron inc cdi no ssi   Musculoskeletal: He exhibits edema (3+ BLE).   Neurological: He is alert and oriented to person, place, and time.   Skin: Skin is warm and dry. Capillary refill takes 2 to 3 seconds. He is not  diaphoretic.   Psychiatric: He has a normal mood and affect. His behavior is normal. Judgment and thought content normal.   Nursing note and vitals reviewed.      Laboratory:  Immunosuppressants         Stop Route Frequency     tacrolimus capsule 4 mg      -- Oral 2 times daily     mycophenolate capsule 1,000 mg      -- Oral 2 times daily        CBC:     Recent Labs  Lab 08/09/18  0439   WBC 4.78   RBC 2.62*   HGB 8.3*   HCT 25.8*   PLT 89*   MCV 99*   MCH 31.7*   MCHC 32.2     CMP:     Recent Labs  Lab 08/09/18 0439   *   CALCIUM 8.5*   ALBUMIN 2.5*   PROT 4.3*   *   K 4.7   CO2 21*      BUN 51*   CREATININE 4.0*   ALKPHOS 62   ALT 17   AST 10   BILITOT 0.5     Coagulation:   No results for input(s): PT, INR, APTT in the last 168 hours.    Labs within the past 24 hours have been reviewed.    Diagnostic Results:  None

## 2018-08-09 NOTE — ASSESSMENT & PLAN NOTE
- Post transplant required blood transfusion 8/1 for H&H 6.6/20.3; 8/2 for H&H 6.4/19.7.  - Epo started on 8/3  - PRBC 1 unit given 8/5  - H/H remains stable

## 2018-08-09 NOTE — PLAN OF CARE
Problem: Occupational Therapy Goal  Goal: Occupational Therapy Goal  Goals to be met by: 8/10/18    Patient will increase functional independence with ADLs by performing:     Upper body dressing while seated EOB with SBA  Grooming while standing at sink with Stand-by Assistance  Toileting from bedside commode with Minimal Assistance for hygiene and clothing management.  Toilet transfer to bedside commode with Contact Guard Assistance.  Pt will complete a functional static standing activity x ~4 minutes with CGA.   LB Dressing with Moderate Assistance           Outcome: Ongoing (interventions implemented as appropriate)  Continue with POC.   Aida buckley OT  8/9/2018

## 2018-08-09 NOTE — ASSESSMENT & PLAN NOTE
- LFTs/enzymes normalized  - POD 1 U/s with elevated RIs  - Repeat liver U/S with stable, hyperechoic liver lesion, fluid collection inferior smaller, RIs persistently mildly elevated and mild to mod ascites.  Monitor.  - POD #17 liver enzymes perfect.

## 2018-08-09 NOTE — ASSESSMENT & PLAN NOTE
- With CKD3  - Expected VICKY post txp  - UOP unclear as pt unable to measure urine (severe scrotal edema- improving) though feels UOP improving.  - Tolerated HD 8/1 and 8/3.   - Lasix given over weekend and seems to have helped UOP  - Delta Cr 0.2 8/6, 0.5 8/7.  Delta Cr 0 w increased UOP  -  Nephrology following and HD per their recs.   - Lasix 120 mg given 8/9/18

## 2018-08-09 NOTE — PLAN OF CARE
Hyperglycemic yesterday despite increase of mealtime insulin. Fasting BG at goal. No hypoglycemic episodes.      Recommendations:  1. Steroid induced hyperglycemia  --increase aspart to 6U TID w M  --continue to monitor BG AC/HS and low dose insulin correction scale     Discharge needs: tbd  Will follow along, please call if you have any questions.     Viry Dagile PGY-3  Endocrine Consults

## 2018-08-09 NOTE — ASSESSMENT & PLAN NOTE
- 2D Echo 7/9 normal EF (55-60%), trivial tricuspid/mitral regurg.  - Will need to assess when to resume home ASA.

## 2018-08-09 NOTE — SUBJECTIVE & OBJECTIVE
Interval History: continues with good urine output, but still very edematous, did not receive lasix yesterday    Review of patient's allergies indicates:   Allergen Reactions    Nsaids (non-steroidal anti-inflammatory drug)      D/t to liver disease.    Penicillins Other (See Comments)     Tolerated pip-tazo in 8/2016 without issue  Tolerated cefepime 7/2018 without issue  Since childhood was told not to take it     Current Facility-Administered Medications   Medication Frequency    0.9%  NaCl infusion PRN    acetaminophen tablet 500 mg Q6H PRN    acyclovir capsule 400 mg BID    atorvastatin tablet 40 mg Daily    dextrose 50% injection 12.5 g PRN    dextrose 50% injection 25 g PRN    docusate sodium capsule 100 mg Daily    epoetin dread injection 10,000 Units Every Tues, Thurs, Sat    ergocalciferol capsule 50,000 Units Q7 Days    fluconazole tablet 200 mg Daily    gelatin adsorbable 100cm top sponge 100 sponge 1 applicator PRN    glucagon (human recombinant) injection 1 mg PRN    glucose chewable tablet 16 g PRN    glucose chewable tablet 24 g PRN    heparin (porcine) injection 5,000 Units Q8H    insulin aspart U-100 pen 0-5 Units QID (AC + HS) PRN    insulin aspart U-100 pen 6 Units TIDWM    levETIRAcetam tablet 500 mg BID    magnesium oxide tablet 800 mg BID    miconazole nitrate 2% ointment BID    mycophenolate capsule 1,000 mg BID    ondansetron injection 4 mg Q8H PRN    oxyCODONE immediate release tablet 5 mg Q4H PRN    pantoprazole EC tablet 40 mg BID    [START ON 8/12/2018] predniSONE tablet 10 mg Daily    [START ON 8/10/2018] predniSONE tablet 15 mg Daily    ramelteon tablet 8 mg Nightly PRN    simethicone chewable tablet 80 mg TID PRN    sodium chloride 0.9% flush 3 mL PRN    sulfamethoxazole-trimethoprim 400-80mg per tablet 1 tablet Every Mon, Wed, Fri    tacrolimus capsule 4 mg BID    traMADol tablet 50 mg Q6H PRN    triamcinolone acetonide 0.1% cream BID        Objective:     Vital Signs (Most Recent):  Temp: 98.5 °F (36.9 °C) (08/09/18 1709)  Pulse: 88 (08/09/18 1709)  Resp: 18 (08/09/18 1709)  BP: (!) 160/74 (08/09/18 1709)  SpO2: 95 % (08/09/18 1709)  O2 Device (Oxygen Therapy): room air (08/09/18 1319) Vital Signs (24h Range):  Temp:  [97.9 °F (36.6 °C)-98.5 °F (36.9 °C)] 98.5 °F (36.9 °C)  Pulse:  [73-92] 88  Resp:  [16-18] 18  SpO2:  [95 %-97 %] 95 %  BP: (130-160)/(65-74) 160/74     Weight: 131.9 kg (290 lb 12.6 oz) (08/09/18 0500)  Body mass index is 37.33 kg/m².  Body surface area is 2.62 meters squared.    I/O last 3 completed shifts:  In: 2100 [P.O.:2100]  Out: 0     Physical Exam   Constitutional: He is oriented to person, place, and time.   HENT:   Head: Atraumatic.   Neck: No JVD present.   Cardiovascular: Normal rate and regular rhythm.  Exam reveals no friction rub.    Pulmonary/Chest: Effort normal. He has rales.   Abdominal: Soft. He exhibits distension.   Musculoskeletal: He exhibits edema.   Neurological: He is alert and oriented to person, place, and time.   Skin: Skin is warm.   Psychiatric: He has a normal mood and affect.

## 2018-08-09 NOTE — PROGRESS NOTES
[facility]  Liver Transplant  Progress Note    Patient Name: Alon Adamson  MRN: [id number]  Admission Date: 2018  Hospital Length of Stay: 48 days  Code Status: Full Code  Primary Care Provider: Mckinley Vides MD  Post-Operative Day: 17    ORGAN:   LIVER  Disease Etiology: Alcoholic Cirrhosis  Donor Type:    - Brain Death  CDC High Risk:   No  Donor CMV Status:   Donor CMV Status: Negative  Donor HBcAB:   Negative  Donor HCV Status:   Negative  Donor HBV TAMAR: Negative  Donor HCV TAMAR: Negative  Whole or Partial: Whole Liver  Biliary Anastomosis: End to End  Arterial Anatomy: Standard  Subjective:     History of Present Illness:  Mr. 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  Complications of liver disease include hyperbilirubinemia, hypoalbuminemia, severe malnutrition,   hepatic encephalopathy, thrombocytopenia, splenomegaly, ascites, hypervolemia, coagulopathy, portal HTN.  Pt admitted  for acutely worsening hepatic encephalopathy and concern for BLE cellulitis as well as severe volume overload.  Complications of ELSD managed while inpt and he received liver offer on 18.  Pt underwent liver transplant without complication: steroid induction, DBD, CMV -/-.  Donor with E coli bacteremia resistant to cipro placed on Rocephin x 2 weeks per ID recs.  Also on fluconazole x 30 days as pt very ill prior to transplant. Post transplant, with nice trend down of AST/ALT and LFTs.  Liver U/S with elevated RIs and decreased HAV.  During initial post operative period, pt required pressor support.  Pt with expected post operative VICKY as with VICKY prior to surgery.  Nephrology consulted and pt placed on intermittent CRRT.  Pressor requirement decreased and pt tolerated CRRT without pressors.  Pt with asymptomatic bradycardia while in ICU prompting cardiology consult. Pt without need for acute intervention but atropine placed at bedside.  HR has improved POD  7 and 8.  He was transferred to the TSU 7/30 PM.          Liver US 7/31 showed small fluid collection likely an adrenal hemorrhage vs hematoma. H&H decreased to 6.6/20.3 8/1- responded to 1 u PRBCs. H/H stable 8/2 AM. Dropped again 8/3 AM- 1 u PRBC given. H&H now stable. Iron studies checked and pt started on epo.     Hospital Course:  Interval history: No acute events overnight.  Sleeping well.  Appetite improved.  Abdominal pain well managed w PRN meds.  Cr up from 3.9 to 4.  Last HD 8/3. Urine output picking up.  Nephrology following.  Plan to give lasix 120 mg x1 .  Patient unable to measure urine given significant scrotal edema.  Breathing stable, pulse ox 96-99%.  LFTs nomalized.  Cont to encourage up in chair.  Cont aggressive PT.      Scheduled Meds:   acyclovir  400 mg Oral BID    atorvastatin  40 mg Oral Daily    docusate sodium  100 mg Oral Daily    epoetin dread  10,000 Units Intravenous Every Tues, Thurs, Sat    ergocalciferol  50,000 Units Oral Q7 Days    fluconazole  200 mg Oral Daily    heparin (porcine)  5,000 Units Subcutaneous Q8H    insulin aspart U-100  6 Units Subcutaneous TIDWM    levETIRAcetam  500 mg Oral BID    magnesium oxide  800 mg Oral BID    miconazole nitrate 2%   Topical (Top) BID    mycophenolate  1,000 mg Oral BID    pantoprazole  40 mg Oral BID    [START ON 8/12/2018] predniSONE  10 mg Oral Daily    [START ON 8/10/2018] predniSONE  15 mg Oral Daily    sulfamethoxazole-trimethoprim 400-80mg  1 tablet Oral Every Mon, Wed, Fri    tacrolimus  4 mg Oral BID    triamcinolone acetonide 0.1%   Topical (Top) BID     Continuous Infusions:  PRN Meds:sodium chloride 0.9%, acetaminophen, dextrose 50%, dextrose 50%, gelatin adsorbable 100cm top sponge, glucagon (human recombinant), glucose, glucose, insulin aspart U-100, ondansetron, oxyCODONE, ramelteon, simethicone, sodium chloride 0.9%, traMADol    Review of Systems   Constitutional: Positive for activity change, appetite  change and fatigue. Negative for chills, diaphoresis and fever.   HENT: Negative for congestion and facial swelling.    Eyes: Negative for pain, discharge and visual disturbance.   Respiratory: Negative for cough, chest tightness, shortness of breath and wheezing.    Cardiovascular: Positive for leg swelling. Negative for chest pain and palpitations.   Gastrointestinal: Positive for abdominal distention. Negative for abdominal pain, constipation, diarrhea, nausea and vomiting.   Endocrine: Negative.    Genitourinary: Negative for decreased urine volume (Improving- unable to measure).   Musculoskeletal: Negative for back pain.   Skin: Positive for wound.   Allergic/Immunologic: Positive for immunocompromised state.   Neurological: Positive for weakness. Negative for dizziness, tremors and headaches.   Hematological: Negative.    Psychiatric/Behavioral: Negative for agitation, dysphoric mood and sleep disturbance. The patient is not nervous/anxious.      Objective:     Vital Signs (Most Recent):  Temp: 98.2 °F (36.8 °C) (08/09/18 1146)  Pulse: 92 (08/09/18 1457)  Resp: 16 (08/09/18 1319)  BP: (!) 152/65 (08/09/18 1146)  SpO2: 95 % (08/09/18 1319) Vital Signs (24h Range):  Temp:  [97.9 °F (36.6 °C)-98.4 °F (36.9 °C)] 98.2 °F (36.8 °C)  Pulse:  [73-92] 92  Resp:  [16-18] 16  SpO2:  [95 %-97 %] 95 %  BP: (130-159)/(58-72) 152/65     Weight: 131.9 kg (290 lb 12.6 oz)  Body mass index is 37.33 kg/m².    Intake/Output - Last 3 Shifts       08/07 0700 - 08/08 0659 08/08 0700 - 08/09 0659 08/09 0700 - 08/10 0659    P.O. 1260 1560     I.V. (mL/kg)  0 (0)     Other  0     Total Intake(mL/kg) 1260 (9.6) 1560 (11.8)     Urine (mL/kg/hr)  0 (0)     Emesis/NG output  0 (0)     Other  0 (0)     Stool  0 (0)     Blood  0 (0)     Total Output   0      Net +1260 +1560             Urine Occurrence 9 x 11 x     Stool Occurrence 0 x 1 x     Emesis Occurrence  0 x           Physical Exam   Constitutional: He is oriented to person, place,  and time. He appears well-developed. No distress.   Temporal and distal extremity muscle wasting   HENT:   Head: Normocephalic and atraumatic.   Eyes: EOM are normal. Pupils are equal, round, and reactive to light. No scleral icterus.   Neck: Normal range of motion.   Cardiovascular: Normal rate, regular rhythm and normal heart sounds.    Pulmonary/Chest: Effort normal. No respiratory distress. He has no wheezes.   Coarse BS   Abdominal: Soft. Bowel sounds are normal. He exhibits distension (ascites). There is no tenderness.   Chevron inc cdi no ssi   Musculoskeletal: He exhibits edema (3+ BLE).   Neurological: He is alert and oriented to person, place, and time.   Skin: Skin is warm and dry. Capillary refill takes 2 to 3 seconds. He is not diaphoretic.   Psychiatric: He has a normal mood and affect. His behavior is normal. Judgment and thought content normal.   Nursing note and vitals reviewed.      Laboratory:  Immunosuppressants         Stop Route Frequency     tacrolimus capsule 4 mg      -- Oral 2 times daily     mycophenolate capsule 1,000 mg      -- Oral 2 times daily        CBC:     Recent Labs  Lab 08/09/18  0439   WBC 4.78   RBC 2.62*   HGB 8.3*   HCT 25.8*   PLT 89*   MCV 99*   MCH 31.7*   MCHC 32.2     CMP:     Recent Labs  Lab 08/09/18  0439   *   CALCIUM 8.5*   ALBUMIN 2.5*   PROT 4.3*   *   K 4.7   CO2 21*      BUN 51*   CREATININE 4.0*   ALKPHOS 62   ALT 17   AST 10   BILITOT 0.5     Coagulation:   No results for input(s): PT, INR, APTT in the last 168 hours.    Labs within the past 24 hours have been reviewed.    Diagnostic Results:  None    Assessment/Plan:     * Liver transplanted    - LFTs/enzymes normalized  - POD 1 U/s with elevated RIs  - Repeat liver U/S with stable, hyperechoic liver lesion, fluid collection inferior smaller, RIs persistently mildly elevated and mild to mod ascites.  Monitor.  - POD #17 liver enzymes perfect.        Long-term use of immunosuppressant  medication    - see prophylactic immuno.           Prophylactic immunotherapy    - continue prograf, cellcept and prednisone. Monitor labs daily and adjust dose accordingly for a therapeutic level.           At risk for opportunistic infections    - Opportunistic infection prophylaxis will include Acyclovir for 3 months (CMV D- R-), Bactrim for 6 months, and Fluconazole          Sinus bradycardia    - Improved  - Atropine at bedside if HR decreases again          Steroid-induced hyperglycemia    - Endocrine consulted          CKD (chronic kidney disease), stage III    - See VICKY.          Stage II pressure ulcer of sacral region    - Wound care following          Severe protein-calorie malnutrition    - Dietary consulted.  - Continue supplements.  - Pt eating well.        Physical deconditioning    - SLOWLY to improve with PT/OT  - Will place SNF consult once medically ready        Pancytopenia    - Related to decompensated liver disease --> improving now that pt transplanted.        Alteration in skin integrity    - Needs aggressive therapy and OOB          Acute kidney injury superimposed on chronic kidney disease    - With CKD3  - Expected VICKY post txp  - UOP unclear as pt unable to measure urine (severe scrotal edema- improving) though feels UOP improving.  - Tolerated HD 8/1 and 8/3.   - Lasix given over weekend and seems to have helped UOP  - Delta Cr 0.2 8/6, 0.5 8/7.  Delta Cr 0 w increased UOP  -  Nephrology following and HD per their recs.   - Lasix 120 mg given 8/9/18        Coronary artery disease involving native coronary artery of native heart without angina pectoris    - 2D Echo 7/9 normal EF (55-60%), trivial tricuspid/mitral regurg.  - Will need to assess when to resume home ASA.         Anemia of chronic disease    - Post transplant required blood transfusion 8/1 for H&H 6.6/20.3; 8/2 for H&H 6.4/19.7.  - Epo started on 8/3  - PRBC 1 unit given 8/5  - H/H remains stable        Thrombocytopenia    -  Secondary to splenomegaly from portal HTN- improving post transplant        Hypoalbuminemia due to protein-calorie malnutrition    - appetite improving.  Pt states he is increasing protein in diet.            Seizures    - Continue oral Keppra.  - Keep Mg > 2.        Bilateral edema of lower extremity    - Was on intermittent dialysis prior to transplant  - Tolerated HD well 8/1.  Last HD 8/3.   - Wrapped by wound care with significant improvement            VTE Risk Mitigation         Ordered     heparin (porcine) injection 5,000 Units  Every 8 hours      07/26/18 1330     Place sequential compression device  Until discontinued      07/23/18 1326     IP VTE HIGH RISK PATIENT  Once      07/23/18 1242          The patients clinical status was discussed at multidisplinary rounds, involving transplant surgery, transplant medicine, pharmacy, nursing, nutrition, and social work    Discharge Planning:  No plan for discharge.  Awaiting renal recovery and to get stronger.    Ana Paula Cleary, NP  Liver Transplant  [facility]

## 2018-08-09 NOTE — PT/OT/SLP PROGRESS
"Occupational Therapy   Treatment    Name: Alon Adamson  MRN: [id number]  Admitting Diagnosis:  Liver transplanted  17 Days Post-Op    Recommendations:     Discharge Recommendations: nursing facility, skilled  Discharge Equipment Recommendations:   (TBD)  Barriers to discharge:  Inaccessible home environment, Decreased caregiver support    Subjective     Communicated with: RN prior to session.  Pt agreeable to participate with therapy. No family present for education.     " I feel so tired today.. I only got three hours of sleep"     Pain/Comfort:  · Pain Rating 1: 0/10  · Pain Rating Post-Intervention 1: 0/10    Patients cultural, spiritual, [employer]ism conflicts given the current situation: none    Objective:     Patient found with: telemetry, central line    General Precautions: Standard, fall   Orthopedic Precautions:N/A   Braces: N/A     Occupational Performance:    Bed Mobility:    · Patient completed Rolling/Turning to Left with  minimum assistance  · Patient completed Rolling/Turning to Right with minimum assistance  · Patient completed Scooting/Bridging with stand by assistance  · Patient completed Supine to Sit with minimum assistance  · Patient completed Sit to Supine with minimum assistance     Functional Mobility/Transfers:  · Patient completed Sit <> Stand Transfer with total assistance  with  no assistive device    --x4 attempts-- unable to reach to full standing position     Activities of Daily Living:  · Upper Body Dressing: moderate assistance to fer gown like jacket while seated EOB  · Lower Body Dressing: total assistance to fer B socks while seated EOB  · Toileting: moderate assistance to complete toileting hygiene-- pt soiled upon arrival ( pt able to assist to clean self at bed level)     Patient left with bed in chair position with all lines intact, call button in reach and RN notified    [facility] 6 Click:  [facility] Total Score: 12    Treatment & Education:  -Pt edu on OT role/POC  -Importance " of OOB activity with staff assistance  -Safety during functional t/f and mobility  - White board updated  - Pt completed BUE AROM exercises while seated UIC including: 1x15 reps in shoulder flexion, elbow flexion/extension, chest press, and digit flexion/extension. Pt required frequent rest breaks 2/2 fatigue  - Reviewed BUE HEP to complete 3x daily     Education:    Assessment:     Alon Adamson is a 63 y.o. male with a medical diagnosis of Liver transplanted.  He presents with fatigue and fair activity tolerance this date.  Performance deficits affecting function are impaired endurance, weakness, gait instability, impaired balance, decreased upper extremity function, decreased lower extremity function, decreased safety awareness, impaired self care skills, impaired functional mobilty, impaired cardiopulmonary response to activity.  Pt would benefit from SNF following d/c to continue to progress towards goals and improve quality of life.     Rehab Prognosis:  Good; patient would benefit from acute skilled OT services to address these deficits and reach maximum level of function.       Plan:     Patient to be seen 4 x/week to address the above listed problems via self-care/home management, therapeutic activities, therapeutic exercises, neuromuscular re-education  · Plan of Care Expires: 08/26/18  · Plan of Care Reviewed with: patient    This Plan of care has been discussed with the patient who was involved in its development and understands and is in agreement with the identified goals and treatment plan    GOALS:    Occupational Therapy Goals        Problem: Occupational Therapy Goal    Goal Priority Disciplines Outcome Interventions   Occupational Therapy Goal     OT, PT/OT Ongoing (interventions implemented as appropriate)    Description:  Goals to be met by: 8/10/18    Patient will increase functional independence with ADLs by performing:     Upper body dressing while seated EOB with SBA  Grooming while  standing at sink with Stand-by Assistance  Toileting from bedside commode with Minimal Assistance for hygiene and clothing management.  Toilet transfer to bedside commode with Contact Guard Assistance.  Pt will complete a functional static standing activity x ~4 minutes with CGA.   LB Dressing with Moderate Assistance                            Time Tracking:     OT Date of Treatment: 08/09/18  OT Start Time: 1403  OT Stop Time: 1445  OT Total Time (min): 42 min    Billable Minutes:Self Care/Home Management 20  Therapeutic Activity 12  Therapeutic Exercise 10    Aida buckley, OT  8/9/2018

## 2018-08-09 NOTE — ASSESSMENT & PLAN NOTE
- Was on intermittent dialysis prior to transplant  - Tolerated HD well 8/1.  Last HD 8/3.   - Wrapped by wound care with significant improvement

## 2018-08-09 NOTE — PROGRESS NOTES
[facility]  Nephrology  Progress Note    Patient Name: Alon Adamson  MRN: [id number]  Admission Date: 6/22/2018  Hospital Length of Stay: 48 days  Attending Provider: Scott Hoffmann MD   Primary Care Physician: Mckinley Vides MD  Principal Problem:Liver transplanted    Subjective:     HPI: Mr. Adamson is 63 yr old male with decompensated alcoholic cirrhosis, CKD III and CAD admitted here on 06/22/18 admitted for bilateral LE hypervolemia and VICKY on CKD III with cr of 2.3 baseline around 1.5. Patient has multiple hospitalization in the past secondary to decompensated liver failure. Patient is currently on transplant team. During current admission patient was getting lasix and albumin intermittently for VICKY however renal function was not getting better. Patient hospital course complicated by hepatic encephalopathy with some improvement of mentation with lactulose. Also treated for cellulitis with 7 days of vancomycin.Nephrology is consulted for worsening/not improving VICKY despite lasix/albumin.     Per chart review patient has no hypotensive episodes, BP mostly above 100's. Has not received nephrotoxins such as NSAIDs/contrast media. No sever sepsis/septic shock or acute blood loss during current admit. UA with 2+ leukocytes and Hyaline casts, no wbc/rbc cast or proteinuria. Urine Na+ < 20.    Patient was transferred to ICU on 7/13/2018 after being more lethargic and hypoactive.  After two days was stepped down back to [facility].       Interval History: continues with good urine output, but still very edematous, did not receive lasix yesterday    Review of patient's allergies indicates:   Allergen Reactions    Nsaids (non-steroidal anti-inflammatory drug)      D/t to liver disease.    Penicillins Other (See Comments)     Tolerated pip-tazo in 8/2016 without issue  Tolerated cefepime 7/2018 without issue  Since childhood was told not to take it     Current Facility-Administered  Medications   Medication Frequency    0.9%  NaCl infusion PRN    acetaminophen tablet 500 mg Q6H PRN    acyclovir capsule 400 mg BID    atorvastatin tablet 40 mg Daily    dextrose 50% injection 12.5 g PRN    dextrose 50% injection 25 g PRN    docusate sodium capsule 100 mg Daily    epoetin dread injection 10,000 Units Every Tues, Thurs, Sat    ergocalciferol capsule 50,000 Units Q7 Days    fluconazole tablet 200 mg Daily    gelatin adsorbable 100cm top sponge 100 sponge 1 applicator PRN    glucagon (human recombinant) injection 1 mg PRN    glucose chewable tablet 16 g PRN    glucose chewable tablet 24 g PRN    heparin (porcine) injection 5,000 Units Q8H    insulin aspart U-100 pen 0-5 Units QID (AC + HS) PRN    insulin aspart U-100 pen 6 Units TIDWM    levETIRAcetam tablet 500 mg BID    magnesium oxide tablet 800 mg BID    miconazole nitrate 2% ointment BID    mycophenolate capsule 1,000 mg BID    ondansetron injection 4 mg Q8H PRN    oxyCODONE immediate release tablet 5 mg Q4H PRN    pantoprazole EC tablet 40 mg BID    [START ON 8/12/2018] predniSONE tablet 10 mg Daily    [START ON 8/10/2018] predniSONE tablet 15 mg Daily    ramelteon tablet 8 mg Nightly PRN    simethicone chewable tablet 80 mg TID PRN    sodium chloride 0.9% flush 3 mL PRN    sulfamethoxazole-trimethoprim 400-80mg per tablet 1 tablet Every Mon, Wed, Fri    tacrolimus capsule 4 mg BID    traMADol tablet 50 mg Q6H PRN    triamcinolone acetonide 0.1% cream BID       Objective:     Vital Signs (Most Recent):  Temp: 98.5 °F (36.9 °C) (08/09/18 1709)  Pulse: 88 (08/09/18 1709)  Resp: 18 (08/09/18 1709)  BP: (!) 160/74 (08/09/18 1709)  SpO2: 95 % (08/09/18 1709)  O2 Device (Oxygen Therapy): room air (08/09/18 1319) Vital Signs (24h Range):  Temp:  [97.9 °F (36.6 °C)-98.5 °F (36.9 °C)] 98.5 °F (36.9 °C)  Pulse:  [73-92] 88  Resp:  [16-18] 18  SpO2:  [95 %-97 %] 95 %  BP: (130-160)/(65-74) 160/74     Weight: 131.9 kg (290 lb  12.6 oz) (08/09/18 0500)  Body mass index is 37.33 kg/m².  Body surface area is 2.62 meters squared.    I/O last 3 completed shifts:  In: 2100 [P.O.:2100]  Out: 0     Physical Exam   Constitutional: He is oriented to person, place, and time.   HENT:   Head: Atraumatic.   Neck: No JVD present.   Cardiovascular: Normal rate and regular rhythm.  Exam reveals no friction rub.    Pulmonary/Chest: Effort normal. He has rales.   Abdominal: Soft. He exhibits distension.   Musculoskeletal: He exhibits edema.   Neurological: He is alert and oriented to person, place, and time.   Skin: Skin is warm.   Psychiatric: He has a normal mood and affect.           Assessment/Plan:     CKD (chronic kidney disease), stage III    with acute component and that has required dialysis, but now stabilizing (see VICKY section)        Acute kidney injury superimposed on chronic kidney disease    ATN on CKD    Continues with good urine output, although not recorded    Still very edematous    - continue holding RRT and reassess daily  - recommend lasix 120mg IV x 1 and reassess pending response  - strict Is & Os and serial RFPs  - Avoid nephrotoxic medications  - Hb > 7gm/dL        Bilateral edema of lower extremity    Lasix as outlined in VICKY section            Thank you for your consult. I will follow-up with patient. Please contact us if you have any additional questions.    Elan Magana MD  Nephrology  [facility]    ATTENDING PHYSICIAN ATTESTATION  I have personally interviewed and examined the patient. I thoroughly reviewed the demographic, clinical, laboratorial and imaging information available in medical records. I agree with the assessment and recommendations provided by the subspecialty resident. Dr. Magana was under my supervision.

## 2018-08-09 NOTE — ASSESSMENT & PLAN NOTE
ATN on CKD    Continues with good urine output, although not recorded    Still very edematous    - continue holding RRT and reassess daily  - recommend lasix 120mg IV x 1 and reassess pending response  - strict Is & Os and serial RFPs  - Avoid nephrotoxic medications  - Hb > 7gm/dL

## 2018-08-10 PROBLEM — K72.90 DECOMPENSATED HEPATIC CIRRHOSIS: Status: RESOLVED | Noted: 2018-01-21 | Resolved: 2018-08-10

## 2018-08-10 PROBLEM — K74.60 DECOMPENSATED HEPATIC CIRRHOSIS: Status: RESOLVED | Noted: 2018-01-21 | Resolved: 2018-08-10

## 2018-08-10 PROBLEM — I87.2 ACUTE VENOUS STASIS DERMATITIS OF BOTH LEGS: Status: RESOLVED | Noted: 2018-07-31 | Resolved: 2018-08-10

## 2018-08-10 LAB
ALBUMIN SERPL BCP-MCNC: 2.8 G/DL
ALP SERPL-CCNC: 64 U/L
ALT SERPL W/O P-5'-P-CCNC: 16 U/L
ANION GAP SERPL CALC-SCNC: 6 MMOL/L
AST SERPL-CCNC: 11 U/L
BASOPHILS # BLD AUTO: 0.03 K/UL
BASOPHILS NFR BLD: 0.6 %
BILIRUB SERPL-MCNC: 0.5 MG/DL
BUN SERPL-MCNC: 56 MG/DL
CALCIUM SERPL-MCNC: 8.7 MG/DL
CHLORIDE SERPL-SCNC: 105 MMOL/L
CO2 SERPL-SCNC: 24 MMOL/L
CREAT SERPL-MCNC: 4.1 MG/DL
DIFFERENTIAL METHOD: ABNORMAL
EOSINOPHIL # BLD AUTO: 0.1 K/UL
EOSINOPHIL NFR BLD: 1.3 %
ERYTHROCYTE [DISTWIDTH] IN BLOOD BY AUTOMATED COUNT: 17.7 %
EST. GFR  (AFRICAN AMERICAN): 16.9 ML/MIN/1.73 M^2
EST. GFR  (NON AFRICAN AMERICAN): 14.6 ML/MIN/1.73 M^2
GLUCOSE SERPL-MCNC: 120 MG/DL
HCT VFR BLD AUTO: 26.7 %
HGB BLD-MCNC: 8.5 G/DL
IMM GRANULOCYTES # BLD AUTO: 0.03 K/UL
IMM GRANULOCYTES NFR BLD AUTO: 0.6 %
LYMPHOCYTES # BLD AUTO: 0.5 K/UL
LYMPHOCYTES NFR BLD: 11 %
MAGNESIUM SERPL-MCNC: 1.6 MG/DL
MCH RBC QN AUTO: 31.5 PG
MCHC RBC AUTO-ENTMCNC: 31.8 G/DL
MCV RBC AUTO: 99 FL
MONOCYTES # BLD AUTO: 0.5 K/UL
MONOCYTES NFR BLD: 11.6 %
NEUTROPHILS # BLD AUTO: 3.5 K/UL
NEUTROPHILS NFR BLD: 74.9 %
NRBC BLD-RTO: 0 /100 WBC
PHOSPHATE SERPL-MCNC: 4.9 MG/DL
PLATELET # BLD AUTO: 102 K/UL
PMV BLD AUTO: 9.6 FL
POCT GLUCOSE: 140 MG/DL (ref 70–110)
POCT GLUCOSE: 166 MG/DL (ref 70–110)
POCT GLUCOSE: 169 MG/DL (ref 70–110)
POCT GLUCOSE: 193 MG/DL (ref 70–110)
POCT GLUCOSE: 91 MG/DL (ref 70–110)
POTASSIUM SERPL-SCNC: 5 MMOL/L
PROT SERPL-MCNC: 4.7 G/DL
RBC # BLD AUTO: 2.7 M/UL
SODIUM SERPL-SCNC: 135 MMOL/L
TACROLIMUS BLD-MCNC: 6.6 NG/ML
WBC # BLD AUTO: 4.65 K/UL

## 2018-08-10 PROCEDURE — 94664 DEMO&/EVAL PT USE INHALER: CPT

## 2018-08-10 PROCEDURE — 25000003 PHARM REV CODE 250: Performed by: SURGERY

## 2018-08-10 PROCEDURE — 99233 SBSQ HOSP IP/OBS HIGH 50: CPT | Mod: 24,,, | Performed by: NURSE PRACTITIONER

## 2018-08-10 PROCEDURE — 63600175 PHARM REV CODE 636 W HCPCS: Performed by: STUDENT IN AN ORGANIZED HEALTH CARE EDUCATION/TRAINING PROGRAM

## 2018-08-10 PROCEDURE — 63600175 PHARM REV CODE 636 W HCPCS: Performed by: NURSE PRACTITIONER

## 2018-08-10 PROCEDURE — 97530 THERAPEUTIC ACTIVITIES: CPT

## 2018-08-10 PROCEDURE — 80053 COMPREHEN METABOLIC PANEL: CPT

## 2018-08-10 PROCEDURE — 25000003 PHARM REV CODE 250: Performed by: STUDENT IN AN ORGANIZED HEALTH CARE EDUCATION/TRAINING PROGRAM

## 2018-08-10 PROCEDURE — 97116 GAIT TRAINING THERAPY: CPT

## 2018-08-10 PROCEDURE — 20600001 HC STEP DOWN PRIVATE ROOM

## 2018-08-10 PROCEDURE — 94761 N-INVAS EAR/PLS OXIMETRY MLT: CPT

## 2018-08-10 PROCEDURE — 99232 SBSQ HOSP IP/OBS MODERATE 35: CPT | Mod: GC,,, | Performed by: INTERNAL MEDICINE

## 2018-08-10 PROCEDURE — 84100 ASSAY OF PHOSPHORUS: CPT

## 2018-08-10 PROCEDURE — 25000003 PHARM REV CODE 250: Performed by: NURSE PRACTITIONER

## 2018-08-10 PROCEDURE — 97535 SELF CARE MNGMENT TRAINING: CPT

## 2018-08-10 PROCEDURE — 27000646 HC AEROBIKA DEVICE

## 2018-08-10 PROCEDURE — 25000003 PHARM REV CODE 250: Performed by: PHYSICIAN ASSISTANT

## 2018-08-10 PROCEDURE — 63600175 PHARM REV CODE 636 W HCPCS: Performed by: SURGERY

## 2018-08-10 PROCEDURE — 83735 ASSAY OF MAGNESIUM: CPT

## 2018-08-10 PROCEDURE — 85025 COMPLETE CBC W/AUTO DIFF WBC: CPT

## 2018-08-10 PROCEDURE — 80197 ASSAY OF TACROLIMUS: CPT

## 2018-08-10 RX ORDER — INSULIN ASPART 100 [IU]/ML
7 INJECTION, SOLUTION INTRAVENOUS; SUBCUTANEOUS
Status: DISCONTINUED | OUTPATIENT
Start: 2018-08-10 | End: 2018-08-10

## 2018-08-10 RX ORDER — BISACODYL 10 MG
10 SUPPOSITORY, RECTAL RECTAL DAILY
Status: DISCONTINUED | OUTPATIENT
Start: 2018-08-10 | End: 2018-08-17 | Stop reason: HOSPADM

## 2018-08-10 RX ORDER — LANOLIN ALCOHOL/MO/W.PET/CERES
800 CREAM (GRAM) TOPICAL 2 TIMES DAILY
Status: COMPLETED | OUTPATIENT
Start: 2018-08-10 | End: 2018-08-10

## 2018-08-10 RX ORDER — INSULIN ASPART 100 [IU]/ML
6 INJECTION, SOLUTION INTRAVENOUS; SUBCUTANEOUS
Status: DISCONTINUED | OUTPATIENT
Start: 2018-08-10 | End: 2018-08-13

## 2018-08-10 RX ADMIN — ACYCLOVIR 400 MG: 200 CAPSULE ORAL at 08:08

## 2018-08-10 RX ADMIN — SIMETHICONE CHEW TAB 80 MG 80 MG: 80 TABLET ORAL at 08:08

## 2018-08-10 RX ADMIN — SIMETHICONE CHEW TAB 80 MG 80 MG: 80 TABLET ORAL at 11:08

## 2018-08-10 RX ADMIN — SULFAMETHOXAZOLE AND TRIMETHOPRIM 1 TABLET: 400; 80 TABLET ORAL at 05:08

## 2018-08-10 RX ADMIN — FLUCONAZOLE 200 MG: 200 TABLET ORAL at 08:08

## 2018-08-10 RX ADMIN — ATORVASTATIN CALCIUM 40 MG: 20 TABLET, FILM COATED ORAL at 08:08

## 2018-08-10 RX ADMIN — INSULIN ASPART 6 UNITS: 100 INJECTION, SOLUTION INTRAVENOUS; SUBCUTANEOUS at 08:08

## 2018-08-10 RX ADMIN — HEPARIN SODIUM 5000 UNITS: 5000 INJECTION, SOLUTION INTRAVENOUS; SUBCUTANEOUS at 03:08

## 2018-08-10 RX ADMIN — MYCOPHENOLATE MOFETIL 1000 MG: 250 CAPSULE ORAL at 11:08

## 2018-08-10 RX ADMIN — PANTOPRAZOLE SODIUM 40 MG: 40 TABLET, DELAYED RELEASE ORAL at 08:08

## 2018-08-10 RX ADMIN — TACROLIMUS 4 MG: 1 CAPSULE ORAL at 08:08

## 2018-08-10 RX ADMIN — INSULIN ASPART 6 UNITS: 100 INJECTION, SOLUTION INTRAVENOUS; SUBCUTANEOUS at 05:08

## 2018-08-10 RX ADMIN — MICONAZOLE NITRATE: 20 OINTMENT TOPICAL at 09:08

## 2018-08-10 RX ADMIN — MAGNESIUM OXIDE TAB 400 MG (241.3 MG ELEMENTAL MG) 800 MG: 400 (241.3 MG) TAB at 11:08

## 2018-08-10 RX ADMIN — ONDANSETRON 4 MG: 2 INJECTION INTRAMUSCULAR; INTRAVENOUS at 08:08

## 2018-08-10 RX ADMIN — PREDNISONE 15 MG: 5 TABLET ORAL at 08:08

## 2018-08-10 RX ADMIN — INSULIN ASPART 6 UNITS: 100 INJECTION, SOLUTION INTRAVENOUS; SUBCUTANEOUS at 12:08

## 2018-08-10 RX ADMIN — MYCOPHENOLATE MOFETIL 1000 MG: 250 CAPSULE ORAL at 08:08

## 2018-08-10 RX ADMIN — BISACODYL 10 MG: 10 SUPPOSITORY RECTAL at 12:08

## 2018-08-10 RX ADMIN — LEVETIRACETAM 500 MG: 500 TABLET ORAL at 08:08

## 2018-08-10 RX ADMIN — HEPARIN SODIUM 5000 UNITS: 5000 INJECTION, SOLUTION INTRAVENOUS; SUBCUTANEOUS at 09:08

## 2018-08-10 RX ADMIN — SIMETHICONE CHEW TAB 80 MG 80 MG: 80 TABLET ORAL at 04:08

## 2018-08-10 RX ADMIN — HEPARIN SODIUM 5000 UNITS: 5000 INJECTION, SOLUTION INTRAVENOUS; SUBCUTANEOUS at 05:08

## 2018-08-10 RX ADMIN — DOCUSATE SODIUM 100 MG: 100 CAPSULE, LIQUID FILLED ORAL at 08:08

## 2018-08-10 RX ADMIN — MICONAZOLE NITRATE: 20 OINTMENT TOPICAL at 08:08

## 2018-08-10 RX ADMIN — MAGNESIUM OXIDE TAB 400 MG (241.3 MG ELEMENTAL MG) 800 MG: 400 (241.3 MG) TAB at 08:08

## 2018-08-10 RX ADMIN — TACROLIMUS 4 MG: 1 CAPSULE ORAL at 05:08

## 2018-08-10 NOTE — ASSESSMENT & PLAN NOTE
ATN on CKD    Continues with good urine output, although not recorded    Still very edematous    - continue holding RRT, but would not remove trialysis yet, reassess daily  - recommend starting scheduled lasix at 80mg IV BID  - strict Is & Os and serial RFPs  - Avoid nephrotoxic medications  - Hb > 7gm/dL

## 2018-08-10 NOTE — SUBJECTIVE & OBJECTIVE
Interval History: given lasix yesterday x 1 (120mg) Is & Os not accurately recorded, but per patient, unrecorded output significantly increased, although his edema is still about the same on exam    Review of patient's allergies indicates:   Allergen Reactions    Nsaids (non-steroidal anti-inflammatory drug)      D/t to liver disease.    Penicillins Other (See Comments)     Tolerated pip-tazo in 8/2016 without issue  Tolerated cefepime 7/2018 without issue  Since childhood was told not to take it     Current Facility-Administered Medications   Medication Frequency    0.9%  NaCl infusion PRN    acetaminophen tablet 500 mg Q6H PRN    acyclovir capsule 400 mg BID    atorvastatin tablet 40 mg Daily    bisacodyl suppository 10 mg Daily    dextrose 50% injection 12.5 g PRN    dextrose 50% injection 25 g PRN    docusate sodium capsule 100 mg Daily    epoetin dread injection 10,000 Units Every Tues, Thurs, Sat    ergocalciferol capsule 50,000 Units Q7 Days    fluconazole tablet 200 mg Daily    gelatin adsorbable 100cm top sponge 100 sponge 1 applicator PRN    glucagon (human recombinant) injection 1 mg PRN    glucose chewable tablet 16 g PRN    glucose chewable tablet 24 g PRN    heparin (porcine) injection 5,000 Units Q8H    insulin aspart U-100 pen 0-5 Units QID (AC + HS) PRN    insulin aspart U-100 pen 6 Units TIDWM    levETIRAcetam tablet 500 mg BID    magnesium oxide tablet 800 mg BID    miconazole nitrate 2% ointment BID    mycophenolate capsule 1,000 mg BID    ondansetron injection 4 mg Q8H PRN    oxyCODONE immediate release tablet 5 mg Q4H PRN    pantoprazole EC tablet 40 mg BID    [START ON 8/12/2018] predniSONE tablet 10 mg Daily    [START ON 8/11/2018] predniSONE tablet 15 mg Daily    ramelteon tablet 8 mg Nightly PRN    simethicone chewable tablet 80 mg TID PRN    sodium chloride 0.9% flush 3 mL PRN    sulfamethoxazole-trimethoprim 400-80mg per tablet 1 tablet Every Mon, Wed, Fri     tacrolimus capsule 4 mg BID    traMADol tablet 50 mg Q6H PRN    triamcinolone acetonide 0.1% cream BID       Objective:     Vital Signs (Most Recent):  Temp: 98.4 °F (36.9 °C) (08/10/18 1104)  Pulse: 84 (08/10/18 1104)  Resp: 16 (08/10/18 1104)  BP: (!) 146/65 (08/10/18 1104)  SpO2: (!) 94 % (08/10/18 1104)  O2 Device (Oxygen Therapy): room air (08/10/18 1104) Vital Signs (24h Range):  Temp:  [98.1 °F (36.7 °C)-98.7 °F (37.1 °C)] 98.4 °F (36.9 °C)  Pulse:  [74-92] 84  Resp:  [16-20] 16  SpO2:  [94 %-95 %] 94 %  BP: (129-160)/(63-74) 146/65     Weight: 131.9 kg (290 lb 12.6 oz) (08/09/18 0500)  Body mass index is 37.33 kg/m².  Body surface area is 2.62 meters squared.    I/O last 3 completed shifts:  In: 2000 [P.O.:2000]  Out: 0     Physical Exam   Constitutional: He is oriented to person, place, and time.   HENT:   Head: Atraumatic.   Neck: No JVD present.   Cardiovascular: Normal rate and regular rhythm.  Exam reveals no friction rub.    Pulmonary/Chest: Effort normal and breath sounds normal.   Abdominal: Soft. He exhibits distension.   Genitourinary:   Genitourinary Comments: Scrotal edema/swelling, unchanged   Musculoskeletal: He exhibits edema.   Neurological: He is alert and oriented to person, place, and time.   Skin: Skin is warm.   Psychiatric: He has a normal mood and affect.

## 2018-08-10 NOTE — PROGRESS NOTES
Follow up for coflex dressing change for edema management.  Pt pleased at progress with lower extremities since compression management has been initiated.   Lower extremities are free from wounds , hairless , hemosiderin staining noted,brawny non pitting edema noted,and severe xerosis resolved.  Coflex reapplied after allowing the Sween moisturizer time to absorb.         08/10/18 1300   Pain/Comfort Assessments   Pain Assessment Performed Yes   Acceptable Comfort Level 2       Number Scale   Presence of Pain complains of pain/discomfort   ECG   Rhythm normal sinus rhythm       Wound 06/09/18 0100 Other (comment) lower Leg   Date First Assessed/Time First Assessed: 06/09/18 0100   Pre-existing: Yes  Primary Wound Type: (c) Other (comment)  Side: (c)   Orientation: lower  Location: Leg   Wound Image     Wound WDL ex   Dressing Appearance Clean;Intact   Drainage Amount None   Drainage Characteristics/Odor No odor   Appearance Red;Other (see comments)  (hemociderin staining)   Periwound Area Edematous   Care Cleansed with:;Soap and water;Applied:;Moisturizing agent   Dressing Compression wrap  (Coflex for edema management)   Safety Interventions   Patient Rounds bed in low position;bed wheels locked;call light in reach;clutter free environment maintained;ID band on;placement of personal items at bedside;toileting offered;visualized patient   Safety Promotion/Fall Prevention assistive device/personal item within reach;commode/urinal/bedpan at bedside;Fall Risk reviewed with patient/family;side rails raised x 2;nonskid shoes/socks when out of bed   Safety Precautions emergency equipment at bedside   Daily Care   Activity Management activity adjusted per tolerance*   Positioning   Body Position weight shift assistance provided     Rickie Davis RN CWON  [id number]

## 2018-08-10 NOTE — PT/OT/SLP PROGRESS
"Occupational Therapy   Treatment    Name: Alon Adamsno  MRN: [id number]  Admitting Diagnosis:  Liver transplanted  18 Days Post-Op    Recommendations:     Discharge Recommendations: nursing facility, skilled  Discharge Equipment Recommendations:   (TBD)  Barriers to discharge:  Inaccessible home environment, Decreased caregiver support    Subjective     Communicated with: RN prior to session. 1st attempt in AM pt requested for therapy to return later in PM due to c/o abdominal cramping. OT/PT returned in PM for 2nd attempt and pt found supine in bed. Pt required max encouragement to participate in therapy session from therapist and mother. Pt agreeable to therapy session    Pt stated, " I don't want to go to the bathroom all over the floor. They just gave me a suppository".      Pain/Comfort:  · Pain Rating 1: 0/10 (mild c/o abdominal pain )  · Pain Addressed 1: Reposition, Distraction    Patients cultural, spiritual, Denominational conflicts given the current situation: none stated     Objective:     Patient found with: telemetry, central line    General Precautions: Standard, fall   Orthopedic Precautions:N/A   Braces: N/A     Occupational Performance:    Bed Mobility:    · Patient completed Rolling/Turning to Left with  minimum assistance  · Patient completed Rolling/Turning to Right with minimum assistance  · Patient completed Supine to Sit with contact guard assistance  · Patient completed Sit to Supine with stand by assistance     Functional Mobility/Transfers:  · Patient completed x 2 reps Sit <> Stand Transfer from EOB with minimum assistance x 2 persons  with  rolling walker; pt required min <> mod verbal cues for hand placement, technique, and safety.   · Functional Mobility: Pt took x 6 lateral side steps to the R with CGA using RW with 2nd person present for safety with min verbal cues for step sequence and AD management.   · Standing balance: CGA with RW     Activities of Daily Living:  · Upper Body " Dressing: moderate assistance to don gown like robe while seated EOB and to doff soiled gown in front   · Lower Body Dressing: total assistance to don B  socks while seated EOB.  · Toileting: total assistance; pt found soiled after experiencing BM. Pt required total A to be cleaned and gown changed. Pt able to assist by rolling side to side with CGA <> min A    Patient left HOB elevated with all lines intact, call button in reach and mother  present    [facility] 6 Click:  [facility] Total Score: 12    Treatment & Education:  Pt educated by therapist on:   - Pt educated on role of OT, POC, and goals for therapy.    - Patient and family aware of patient's deficits and therapy progression.   - Educated pt on being appropriate to transfer with nsg and PCT to sit EOB   - Time provided for therapeutic counseling and discussion of health disposition.   - Pt encouraged to sit EOB in order to eat meals and complete self-care grooming tasks.   - Importance of OOB ax's with staff member assistance and sitting OOB majority of day.   - Pt completed ADLs and functional mobility for treatment session as noted above   - Pt verbalized understanding. Pt expressed no further concerns/questions.  - whiteboard updated   Education:    Assessment:     Alon Adamson is a 63 y.o. male with a medical diagnosis of Liver transplanted.  He presents with performance deficits affecting function are weakness, impaired endurance, impaired functional mobilty, impaired self care skills, gait instability, impaired balance, decreased upper extremity function, decreased lower extremity function, pain, decreased safety awareness, impaired cardiopulmonary response to activity, edema. Pt tolerated session well despite encouragement from therapist to participate in therapy session. Pt completed functional sit <>stand transfers with min A x 2 persons, bed mobility with SBA <> min A, and took x6 side steps to the R with CGA using RW. Pt is an appropriate  candidate for SNF in order to further improve overall functional (I)ce with mobility and self-care/ADLs.     Rehab Prognosis:  good; patient would benefit from acute skilled OT services to address these deficits and reach maximum level of function.       Plan:     Patient to be seen 4 x/week to address the above listed problems via self-care/home management, therapeutic activities, therapeutic exercises, neuromuscular re-education  · Plan of Care Expires: 08/26/18  · Plan of Care Reviewed with: patient, mother    This Plan of care has been discussed with the patient who was involved in its development and understands and is in agreement with the identified goals and treatment plan    GOALS:    Occupational Therapy Goals        Problem: Occupational Therapy Goal    Goal Priority Disciplines Outcome Interventions   Occupational Therapy Goal     OT, PT/OT Ongoing (interventions implemented as appropriate)    Description:  Goals to be met by: 8/10/18    Patient will increase functional independence with ADLs by performing:     Upper body dressing while seated EOB with SBA  Grooming while standing at sink with Stand-by Assistance  Toileting from bedside commode with Minimal Assistance for hygiene and clothing management.  Toilet transfer to bedside commode with Contact Guard Assistance.  Pt will complete a functional static standing activity x ~4 minutes with CGA.   LB Dressing with Moderate Assistance                            Time Tracking:     OT Date of Treatment: 08/10/18  OT Start Time: 1339  OT Stop Time: 1412  OT Total Time (min): 33 min    Billable Minutes:Self Care/Home Management 15  Therapeutic Activity 15    Abbey Barnard OT  8/10/2018

## 2018-08-10 NOTE — ASSESSMENT & PLAN NOTE
- cont to have significant scrotal edema and abdominal swelling.  Nephrology following.  Last HD 8/3/18.  Received Lasix 120 mg yesterday.  Pt unable to use urinal to measure output due to edema.  No plan for Lasix today as Cr trending up slowly.  Cont to diurese as tolerated.  Monitor.

## 2018-08-10 NOTE — ASSESSMENT & PLAN NOTE
- Post transplant required blood transfusion 8/1 for H&H 6.6/20.3; 8/2 for H&H 6.4/19.7.  - Epo started on 8/3  - PRBC 1 unit last transfused 8/5  - H/H remains stable

## 2018-08-10 NOTE — PROGRESS NOTES
[facility]  Liver Transplant  Progress Note    Patient Name: Alon Adamson  MRN: [id number]  Admission Date: 2018  Hospital Length of Stay: 49 days  Code Status: Full Code  Primary Care Provider: Mckinley Vides MD  Post-Operative Day: 18    ORGAN:   LIVER  Disease Etiology: Alcoholic Cirrhosis  Donor Type:    - Brain Death  CDC High Risk:   No  Donor CMV Status:   Donor CMV Status: Negative  Donor HBcAB:   Negative  Donor HCV Status:   Negative  Donor HBV TAMAR: Negative  Donor HCV TAMAR: Negative  Whole or Partial: Whole Liver  Biliary Anastomosis: End to End  Arterial Anatomy: Standard  Subjective:     History of Present Illness:  Mr. 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  Complications of liver disease include hyperbilirubinemia, hypoalbuminemia, severe malnutrition,   hepatic encephalopathy, thrombocytopenia, splenomegaly, ascites, hypervolemia, coagulopathy, portal HTN.  Pt admitted  for acutely worsening hepatic encephalopathy and concern for BLE cellulitis as well as severe volume overload.  Complications of ELSD managed while inpt and he received liver offer on 18.  Pt underwent liver transplant without complication: steroid induction, DBD, CMV -/-.  Donor with E coli bacteremia resistant to cipro placed on Rocephin x 2 weeks per ID recs.  Also on fluconazole x 30 days as pt very ill prior to transplant. Post transplant, with nice trend down of AST/ALT and LFTs.  Liver U/S with elevated RIs and decreased HAV.  During initial post operative period, pt required pressor support.  Pt with expected post operative VICKY as with VICKY prior to surgery.  Nephrology consulted and pt placed on intermittent CRRT.  Pressor requirement decreased and pt tolerated CRRT without pressors.  Pt with asymptomatic bradycardia while in ICU prompting cardiology consult. Pt without need for acute intervention but atropine placed at bedside.  HR has improved POD  7 and 8.  He was transferred to the TSU 7/30 PM.          Liver US 7/31 showed small fluid collection likely an adrenal hemorrhage vs hematoma. H&H decreased to 6.6/20.3 8/1- responded to 1 u PRBCs. H/H stable 8/2 AM. Dropped again 8/3 AM- 1 u PRBC given. H&H now stable. Iron studies checked and pt started on epo.     Hospital Course:  Interval history: pt c/o increased abdominal pain, bloating and gas pains.  KUB w scattered bowel gas in multiple air distended loops of small bowel noted. Plan for supp, may need enema.  Encourage patient to exercise in bed and participate w therapy.  Appetite decreased 2/2 to bloating.  Patient not needing pain meds.  Cr trending up 3.9->4->4.1.  Last HD 8/3. Urine increased w Lasix 120 mg x1 but unable to calculate output given incontinence.  Breathing stable, pulse ox 96-99%.  LFTs nomalized.  Cont aggressive PT.  Monitor.    Scheduled Meds:   acyclovir  400 mg Oral BID    atorvastatin  40 mg Oral Daily    bisacodyl  10 mg Rectal Daily    docusate sodium  100 mg Oral Daily    epoetin dread  10,000 Units Intravenous Every Tues, Thurs, Sat    ergocalciferol  50,000 Units Oral Q7 Days    fluconazole  200 mg Oral Daily    heparin (porcine)  5,000 Units Subcutaneous Q8H    insulin aspart U-100  6 Units Subcutaneous TIDWM    levETIRAcetam  500 mg Oral BID    magnesium oxide  800 mg Oral BID    miconazole nitrate 2%   Topical (Top) BID    mycophenolate  1,000 mg Oral BID    pantoprazole  40 mg Oral BID    [START ON 8/12/2018] predniSONE  10 mg Oral Daily    [START ON 8/11/2018] predniSONE  15 mg Oral Daily    sulfamethoxazole-trimethoprim 400-80mg  1 tablet Oral Every Mon, Wed, Fri    tacrolimus  4 mg Oral BID    triamcinolone acetonide 0.1%   Topical (Top) BID     Continuous Infusions:  PRN Meds:sodium chloride 0.9%, acetaminophen, dextrose 50%, dextrose 50%, gelatin adsorbable 100cm top sponge, glucagon (human recombinant), glucose, glucose, insulin aspart U-100,  ondansetron, oxyCODONE, ramelteon, simethicone, sodium chloride 0.9%, traMADol    Review of Systems   Constitutional: Positive for activity change, appetite change and fatigue. Negative for chills, diaphoresis and fever.   HENT: Negative for congestion and facial swelling.    Eyes: Negative for pain, discharge and visual disturbance.   Respiratory: Negative for cough, chest tightness, shortness of breath and wheezing.    Cardiovascular: Positive for leg swelling. Negative for chest pain and palpitations.   Gastrointestinal: Positive for abdominal distention. Negative for abdominal pain, constipation, diarrhea, nausea and vomiting.   Endocrine: Negative.    Genitourinary: Negative for decreased urine volume (Improving- unable to measure).   Musculoskeletal: Negative for back pain.   Skin: Positive for wound.   Allergic/Immunologic: Positive for immunocompromised state.   Neurological: Positive for weakness. Negative for dizziness, tremors and headaches.   Hematological: Negative.    Psychiatric/Behavioral: Negative for agitation, dysphoric mood and sleep disturbance. The patient is not nervous/anxious.      Objective:     Vital Signs (Most Recent):  Temp: 98.4 °F (36.9 °C) (08/10/18 1104)  Pulse: 84 (08/10/18 1104)  Resp: 16 (08/10/18 1104)  BP: (!) 146/65 (08/10/18 1104)  SpO2: (!) 94 % (08/10/18 1104) Vital Signs (24h Range):  Temp:  [98.1 °F (36.7 °C)-98.7 °F (37.1 °C)] 98.4 °F (36.9 °C)  Pulse:  [74-92] 84  Resp:  [16-20] 16  SpO2:  [94 %-95 %] 94 %  BP: (129-160)/(63-74) 146/65     Weight: 131.9 kg (290 lb 12.6 oz)  Body mass index is 37.33 kg/m².    Intake/Output - Last 3 Shifts       08/08 0700 - 08/09 0659 08/09 0700 - 08/10 0659 08/10 0700 - 08/11 0659    P.O. 1560 1460 340    I.V. (mL/kg) 0 (0) 0 (0) 0 (0)    Other 0 0 0    Total Intake(mL/kg) 1560 (11.8) 1460 (11.1) 340 (2.6)    Urine (mL/kg/hr) 0 (0) 0 (0) 75 (0.1)    Emesis/NG output 0 (0) 0 (0) 0 (0)    Other 0 (0) 0 (0) 0 (0)    Stool 0 (0) 0 (0) 0 (0)     Blood 0 (0) 0 (0) 0 (0)    Total Output 0 0 75    Net +1560 +1460 +265           Urine Occurrence 11 x 13 x 0 x    Stool Occurrence 1 x 0 x 0 x    Emesis Occurrence 0 x 0 x 0 x          Physical Exam   Constitutional: He is oriented to person, place, and time. He appears well-developed. No distress.   Temporal and distal extremity muscle wasting   HENT:   Head: Normocephalic and atraumatic.   Eyes: EOM are normal. Pupils are equal, round, and reactive to light. No scleral icterus.   Neck: Normal range of motion.   Cardiovascular: Normal rate, regular rhythm and normal heart sounds.    Pulmonary/Chest: Effort normal. No respiratory distress. He has no wheezes.   Coarse BS   Abdominal: Soft. Bowel sounds are normal. He exhibits distension (ascites). There is no tenderness.   Chevron inc cdi no ssi   Musculoskeletal: He exhibits edema (3+ BLE).   Neurological: He is alert and oriented to person, place, and time.   Skin: Skin is warm and dry. Capillary refill takes 2 to 3 seconds. He is not diaphoretic.   Psychiatric: He has a normal mood and affect. His behavior is normal. Judgment and thought content normal.   Nursing note and vitals reviewed.      Laboratory:  Immunosuppressants         Stop Route Frequency     tacrolimus capsule 4 mg      -- Oral 2 times daily     mycophenolate capsule 1,000 mg      -- Oral 2 times daily        CBC:     Recent Labs  Lab 08/10/18  0400   WBC 4.65   RBC 2.70*   HGB 8.5*   HCT 26.7*   *   MCV 99*   MCH 31.5*   MCHC 31.8*     CMP:     Recent Labs  Lab 08/10/18  0400   *   CALCIUM 8.7   ALBUMIN 2.8*   PROT 4.7*   *   K 5.0   CO2 24      BUN 56*   CREATININE 4.1*   ALKPHOS 64   ALT 16   AST 11   BILITOT 0.5     Coagulation:   No results for input(s): PT, INR, APTT in the last 168 hours.    Labs within the past 24 hours have been reviewed.    Diagnostic Results:  None    Assessment/Plan:     * Liver transplanted    - LFTs/enzymes normalized  - POD 1 US with  elevated RIs  - Repeat liver U/S with stable, hyperechoic liver lesion, fluid collection inferior smaller, RIs persistently mildly elevated and mild to mod ascites.  Monitor.  - POD #18 liver enzymes perfect.        Hypervolemia    - cont to have significant scrotal edema and abdominal swelling.  Nephrology following.  Last HD 8/3/18.  Received Lasix 120 mg yesterday.  Pt unable to use urinal to measure output due to edema.  No plan for Lasix today as Cr trending up slowly.  Cont to diurese as tolerated.  Monitor.           Long-term use of immunosuppressant medication    - see prophylactic immuno.           Prophylactic immunotherapy    - continue prograf, cellcept and prednisone. Monitor labs daily and adjust dose accordingly for a therapeutic level.           At risk for opportunistic infections    - Opportunistic infection prophylaxis will include Acyclovir for 3 months (CMV D- R-), Bactrim for 6 months, and Fluconazole          Sinus bradycardia    - Improved  - Atropine at bedside if HR decreases again          Steroid-induced hyperglycemia    - Endocrine consulted          CKD (chronic kidney disease), stage III    - See VICKY.          Stage II pressure ulcer of sacral region    - Wound care following.          Severe protein-calorie malnutrition    - Dietary consulted.  - Tolerating 2-3 supplements daily.  - Pt eating well.        Physical deconditioning    - SLOW to improve with PT/OT  - Will place SNF consult once medically ready        Pancytopenia    - Related to decompensated liver disease --> improving now that pt transplanted.        Alteration in skin integrity    - Needs aggressive therapy and OOB  - wd care changing dressing to legs weekly.            Acute kidney injury superimposed on chronic kidney disease    - With CKD3  - Expected VICKY post txp  - UOP unclear as pt unable to measure urine (severe scrotal edema- improving) though feels UOP improving.  - Tolerated HD 8/1 and 8/3.   - Lasix given  over weekend and seems to have helped UOP  - Delta Cr 0.2 8/6, 0.5 8/7.  Delta Cr 0 w increased UOP  -  Nephrology following and HD per their recs.   - Lasix 120 mg given 8/9/18.  Cr trending up 3.9->4->4.1 today.         Coronary artery disease involving native coronary artery of native heart without angina pectoris    - 2D Echo 7/9 normal EF (55-60%), trivial tricuspid/mitral regurg.  - Will need to assess when to resume home ASA.         Anemia of chronic disease    - Post transplant required blood transfusion 8/1 for H&H 6.6/20.3; 8/2 for H&H 6.4/19.7.  - Epo started on 8/3  - PRBC 1 unit last transfused 8/5  - H/H remains stable        Thrombocytopenia    - Secondary to splenomegaly from portal HTN- improving post transplant        Hypoalbuminemia due to protein-calorie malnutrition    - appetite fair. He is drinking 2-3 supplements daily.  Pt states he is trying to increase protein in diet.            Seizures    - Continue oral Keppra.  - Keep Mg > 2.  On PO Mag.        Bilateral edema of lower extremity    - Was on intermittent dialysis prior to transplant  - Tolerated HD well 8/1.  Last HD 8/3.   - Wrapped by wound care- changed today.  Significant improvement.            VTE Risk Mitigation         Ordered     heparin (porcine) injection 5,000 Units  Every 8 hours      07/26/18 1330     Place sequential compression device  Until discontinued      07/23/18 1326     IP VTE HIGH RISK PATIENT  Once      07/23/18 1242          The patients clinical status was discussed at multidisplinary rounds, involving transplant surgery, transplant medicine, pharmacy, nursing, nutrition, and social work    Discharge Planning:  No plan for discharge.  He will need SNF when medically appropriate.      Ana Paula Cleary, NP  Liver Transplant  [facility]

## 2018-08-10 NOTE — ASSESSMENT & PLAN NOTE
- appetite fair. He is drinking 2-3 supplements daily.  Pt states he is trying to increase protein in diet.

## 2018-08-10 NOTE — PLAN OF CARE
Problem: Patient Care Overview  Goal: Plan of Care Review  Outcome: Ongoing (interventions implemented as appropriate)    Pt AAOx4 with mother at the bedside. No acute changes over night. Self meds pulled per pt mother 100%. Vitals stable. Pt on tele running sinus rhythm with hr  70's-80's. 's-140's/ 60's; afebrile; satting 95% on room air. Accuchecks being monitored AC/HS with bedtime sugar of  173 requiring  no sliding scale insulin coverage. Pt scrotum severely edematous. Pt with difficulty voiding in urinal 2/2 scrotal edema. Blue incontinence pad with urine changed x4. No BM. Miconazole AF cream applied to scrotum, perineum, and buttock with pad changes. BLE wrapped. Changes by wound care T/F.   R groin skin tear. Dressing applied. Pt c/o feeling trapped air in stomach requesting Gas-X x2. R IJ Trialysis CDI. R UA midline CDI. Saling locked. Pt able to turn independently in bed. Pt able to use feet to push self up in bed while bed in trendelenburg position. Pt has remained free from falls or injury thus far. Bed is in low/ locked position, side rails are up x 3, call light is in reach. Will continue to monitor.

## 2018-08-10 NOTE — PLAN OF CARE
Problem: Patient Care Overview  Goal: Plan of Care Review  Outcome: Ongoing (interventions implemented as appropriate)  Pt AAOx4. VSS.  TELE monitoring in progress, pt SR 70s.    Chevron BEN w/ staples, painted by mother.  No issues.   C/o shoulder pain/soreness.  Heat applied to area.  Mild relief obtained. Pt refusing pain medication at this time.  Abdominal discomfort/increased bloating noted. PRN simethicone given as ordered.  Abd xray obtained.  Possible ileus or partial bowel obstruction noted.  No BM since Wednesday 8/8. Suppository given.  Pt w/ nausea this am.  PRN IV zofran given with relief obtained.   Regular diet ordered. Pt eating 25-75% of meals.  Blood sugars checked ACHS.  Meal insulin given.  SSI not needed so far this shift.  Scrotal edema noted.  Pt w/ unmeasured urine output due to edema.  Able to catch some urine per urinal.  Pads changed as needed.  Miconazole AF cream applied with pad changes.  BLE edema w/ wounds.  Wound care following and wrapping legs T/F.  Pt to be seen this afternoon.  RIJ trialysis still in place.  R UA midline CDI.  Self med box updated and filled. Meds pulled per mother 100%.  Bed lowered and locked in place. Weight shift assistance provided as needed.  Pt able to turn with little assistance.  Able to pull himself up in bed.  No acute events/falls/injuries this shift. See flowsheet for further assessment findings. Will monitor.

## 2018-08-10 NOTE — PLAN OF CARE
Problem: Occupational Therapy Goal  Goal: Occupational Therapy Goal  Goals to be met by: 8/10/18    Patient will increase functional independence with ADLs by performing:     Upper body dressing while seated EOB with SBA  Grooming while standing at sink with Stand-by Assistance  Toileting from bedside commode with Minimal Assistance for hygiene and clothing management.  Toilet transfer to bedside commode with Contact Guard Assistance.  Pt will complete a functional static standing activity x ~4 minutes with CGA.   LB Dressing with Moderate Assistance           Outcome: Ongoing (interventions implemented as appropriate)  Continue POC     Abbey Barnard, OTR/L  [phone]  8/10/2018

## 2018-08-10 NOTE — ASSESSMENT & PLAN NOTE
- LFTs/enzymes normalized  - POD 1 US with elevated RIs  - Repeat liver U/S with stable, hyperechoic liver lesion, fluid collection inferior smaller, RIs persistently mildly elevated and mild to mod ascites.  Monitor.  - POD #18 liver enzymes perfect.

## 2018-08-10 NOTE — PROGRESS NOTES
[facility]  Nephrology  Progress Note    Patient Name: Alon Adamson  MRN: [id number]  Admission Date: 6/22/2018  Hospital Length of Stay: 49 days  Attending Provider: Scott Hoffmann MD   Primary Care Physician: Mckinley Vides MD  Principal Problem:Liver transplanted    Subjective:     HPI: Mr. Adamson is 63 yr old male with decompensated alcoholic cirrhosis, CKD III and CAD admitted here on 06/22/18 admitted for bilateral LE hypervolemia and VICKY on CKD III with cr of 2.3 baseline around 1.5. Patient has multiple hospitalization in the past secondary to decompensated liver failure. Patient is currently on transplant team. During current admission patient was getting lasix and albumin intermittently for VICKY however renal function was not getting better. Patient hospital course complicated by hepatic encephalopathy with some improvement of mentation with lactulose. Also treated for cellulitis with 7 days of vancomycin.Nephrology is consulted for worsening/not improving VICKY despite lasix/albumin.     Per chart review patient has no hypotensive episodes, BP mostly above 100's. Has not received nephrotoxins such as NSAIDs/contrast media. No sever sepsis/septic shock or acute blood loss during current admit. UA with 2+ leukocytes and Hyaline casts, no wbc/rbc cast or proteinuria. Urine Na+ < 20.    Patient was transferred to ICU on 7/13/2018 after being more lethargic and hypoactive.  After two days was stepped down back to [facility].       Interval History: given lasix yesterday x 1 (120mg) Is & Os not accurately recorded, but per patient, unrecorded output significantly increased, although his edema is still about the same on exam    Review of patient's allergies indicates:   Allergen Reactions    Nsaids (non-steroidal anti-inflammatory drug)      D/t to liver disease.    Penicillins Other (See Comments)     Tolerated pip-tazo in 8/2016 without issue  Tolerated cefepime 7/2018 without  issue  Since childhood was told not to take it     Current Facility-Administered Medications   Medication Frequency    0.9%  NaCl infusion PRN    acetaminophen tablet 500 mg Q6H PRN    acyclovir capsule 400 mg BID    atorvastatin tablet 40 mg Daily    bisacodyl suppository 10 mg Daily    dextrose 50% injection 12.5 g PRN    dextrose 50% injection 25 g PRN    docusate sodium capsule 100 mg Daily    epoetin dread injection 10,000 Units Every Tues, Thurs, Sat    ergocalciferol capsule 50,000 Units Q7 Days    fluconazole tablet 200 mg Daily    gelatin adsorbable 100cm top sponge 100 sponge 1 applicator PRN    glucagon (human recombinant) injection 1 mg PRN    glucose chewable tablet 16 g PRN    glucose chewable tablet 24 g PRN    heparin (porcine) injection 5,000 Units Q8H    insulin aspart U-100 pen 0-5 Units QID (AC + HS) PRN    insulin aspart U-100 pen 6 Units TIDWM    levETIRAcetam tablet 500 mg BID    magnesium oxide tablet 800 mg BID    miconazole nitrate 2% ointment BID    mycophenolate capsule 1,000 mg BID    ondansetron injection 4 mg Q8H PRN    oxyCODONE immediate release tablet 5 mg Q4H PRN    pantoprazole EC tablet 40 mg BID    [START ON 8/12/2018] predniSONE tablet 10 mg Daily    [START ON 8/11/2018] predniSONE tablet 15 mg Daily    ramelteon tablet 8 mg Nightly PRN    simethicone chewable tablet 80 mg TID PRN    sodium chloride 0.9% flush 3 mL PRN    sulfamethoxazole-trimethoprim 400-80mg per tablet 1 tablet Every Mon, Wed, Fri    tacrolimus capsule 4 mg BID    traMADol tablet 50 mg Q6H PRN    triamcinolone acetonide 0.1% cream BID       Objective:     Vital Signs (Most Recent):  Temp: 98.4 °F (36.9 °C) (08/10/18 1104)  Pulse: 84 (08/10/18 1104)  Resp: 16 (08/10/18 1104)  BP: (!) 146/65 (08/10/18 1104)  SpO2: (!) 94 % (08/10/18 1104)  O2 Device (Oxygen Therapy): room air (08/10/18 1104) Vital Signs (24h Range):  Temp:  [98.1 °F (36.7 °C)-98.7 °F (37.1 °C)] 98.4 °F (36.9  °C)  Pulse:  [74-92] 84  Resp:  [16-20] 16  SpO2:  [94 %-95 %] 94 %  BP: (129-160)/(63-74) 146/65     Weight: 131.9 kg (290 lb 12.6 oz) (08/09/18 0500)  Body mass index is 37.33 kg/m².  Body surface area is 2.62 meters squared.    I/O last 3 completed shifts:  In: 2000 [P.O.:2000]  Out: 0     Physical Exam   Constitutional: He is oriented to person, place, and time.   HENT:   Head: Atraumatic.   Neck: No JVD present.   Cardiovascular: Normal rate and regular rhythm.  Exam reveals no friction rub.    Pulmonary/Chest: Effort normal and breath sounds normal.   Abdominal: Soft. He exhibits distension.   Genitourinary:   Genitourinary Comments: Scrotal edema/swelling, unchanged   Musculoskeletal: He exhibits edema.   Neurological: He is alert and oriented to person, place, and time.   Skin: Skin is warm.   Psychiatric: He has a normal mood and affect.           Assessment/Plan:     CKD (chronic kidney disease), stage III    with acute component and that has required dialysis, but now stabilizing (see VICKY section)        Acute kidney injury superimposed on chronic kidney disease    ATN on CKD    Continues with good urine output, although not recorded    Still very edematous    - continue holding RRT, but would not remove trialysis yet, reassess daily  - recommend starting scheduled lasix at 80mg IV BID  - strict Is & Os and serial RFPs  - Avoid nephrotoxic medications  - Hb > 7gm/dL        Bilateral edema of lower extremity    Lasix as outlined in VICKY section            Thank you for your consult. I will follow-up with patient. Please contact us if you have any additional questions.    Elan Magana MD  Nephrology  [facility]    ATTENDING PHYSICIAN ATTESTATION  I have personally interviewed and examined the patient. I thoroughly reviewed the demographic, clinical, laboratorial and imaging information available in medical records. I agree with the assessment and recommendations provided by the  subspecialty resident. Dr. Magana was under my supervision.

## 2018-08-10 NOTE — ASSESSMENT & PLAN NOTE
- Was on intermittent dialysis prior to transplant  - Tolerated HD well 8/1.  Last HD 8/3.   - Wrapped by wound care- changed today.  Significant improvement.

## 2018-08-10 NOTE — ASSESSMENT & PLAN NOTE
- With CKD3  - Expected VICKY post txp  - UOP unclear as pt unable to measure urine (severe scrotal edema- improving) though feels UOP improving.  - Tolerated HD 8/1 and 8/3.   - Lasix given over weekend and seems to have helped UOP  - Delta Cr 0.2 8/6, 0.5 8/7.  Delta Cr 0 w increased UOP  -  Nephrology following and HD per their recs.   - Lasix 120 mg given 8/9/18.  Cr trending up 3.9->4->4.1 today.

## 2018-08-10 NOTE — PLAN OF CARE
BG still up from goal. No hypoglycemic episodes.     1. Steroid induced hyperglycemia  --No new changes today  --continue aspart 6U TID w M  --continue to monitor BG AC/HS and low dose insulin correction scale     Discharge needs: tbd  Will follow along, please call if you have any questions.

## 2018-08-10 NOTE — SUBJECTIVE & OBJECTIVE
Scheduled Meds:   acyclovir  400 mg Oral BID    atorvastatin  40 mg Oral Daily    bisacodyl  10 mg Rectal Daily    docusate sodium  100 mg Oral Daily    epoetin dread  10,000 Units Intravenous Every Tues, Thurs, Sat    ergocalciferol  50,000 Units Oral Q7 Days    fluconazole  200 mg Oral Daily    heparin (porcine)  5,000 Units Subcutaneous Q8H    insulin aspart U-100  6 Units Subcutaneous TIDWM    levETIRAcetam  500 mg Oral BID    magnesium oxide  800 mg Oral BID    miconazole nitrate 2%   Topical (Top) BID    mycophenolate  1,000 mg Oral BID    pantoprazole  40 mg Oral BID    [START ON 8/12/2018] predniSONE  10 mg Oral Daily    [START ON 8/11/2018] predniSONE  15 mg Oral Daily    sulfamethoxazole-trimethoprim 400-80mg  1 tablet Oral Every Mon, Wed, Fri    tacrolimus  4 mg Oral BID    triamcinolone acetonide 0.1%   Topical (Top) BID     Continuous Infusions:  PRN Meds:sodium chloride 0.9%, acetaminophen, dextrose 50%, dextrose 50%, gelatin adsorbable 100cm top sponge, glucagon (human recombinant), glucose, glucose, insulin aspart U-100, ondansetron, oxyCODONE, ramelteon, simethicone, sodium chloride 0.9%, traMADol    Review of Systems   Constitutional: Positive for activity change, appetite change and fatigue. Negative for chills, diaphoresis and fever.   HENT: Negative for congestion and facial swelling.    Eyes: Negative for pain, discharge and visual disturbance.   Respiratory: Negative for cough, chest tightness, shortness of breath and wheezing.    Cardiovascular: Positive for leg swelling. Negative for chest pain and palpitations.   Gastrointestinal: Positive for abdominal distention. Negative for abdominal pain, constipation, diarrhea, nausea and vomiting.   Endocrine: Negative.    Genitourinary: Negative for decreased urine volume (Improving- unable to measure).   Musculoskeletal: Negative for back pain.   Skin: Positive for wound.   Allergic/Immunologic: Positive for immunocompromised  state.   Neurological: Positive for weakness. Negative for dizziness, tremors and headaches.   Hematological: Negative.    Psychiatric/Behavioral: Negative for agitation, dysphoric mood and sleep disturbance. The patient is not nervous/anxious.      Objective:     Vital Signs (Most Recent):  Temp: 98.4 °F (36.9 °C) (08/10/18 1104)  Pulse: 84 (08/10/18 1104)  Resp: 16 (08/10/18 1104)  BP: (!) 146/65 (08/10/18 1104)  SpO2: (!) 94 % (08/10/18 1104) Vital Signs (24h Range):  Temp:  [98.1 °F (36.7 °C)-98.7 °F (37.1 °C)] 98.4 °F (36.9 °C)  Pulse:  [74-92] 84  Resp:  [16-20] 16  SpO2:  [94 %-95 %] 94 %  BP: (129-160)/(63-74) 146/65     Weight: 131.9 kg (290 lb 12.6 oz)  Body mass index is 37.33 kg/m².    Intake/Output - Last 3 Shifts       08/08 0700 - 08/09 0659 08/09 0700 - 08/10 0659 08/10 0700 - 08/11 0659    P.O. 1560 1460 340    I.V. (mL/kg) 0 (0) 0 (0) 0 (0)    Other 0 0 0    Total Intake(mL/kg) 1560 (11.8) 1460 (11.1) 340 (2.6)    Urine (mL/kg/hr) 0 (0) 0 (0) 75 (0.1)    Emesis/NG output 0 (0) 0 (0) 0 (0)    Other 0 (0) 0 (0) 0 (0)    Stool 0 (0) 0 (0) 0 (0)    Blood 0 (0) 0 (0) 0 (0)    Total Output 0 0 75    Net +1560 +1460 +265           Urine Occurrence 11 x 13 x 0 x    Stool Occurrence 1 x 0 x 0 x    Emesis Occurrence 0 x 0 x 0 x          Physical Exam   Constitutional: He is oriented to person, place, and time. He appears well-developed. No distress.   Temporal and distal extremity muscle wasting   HENT:   Head: Normocephalic and atraumatic.   Eyes: EOM are normal. Pupils are equal, round, and reactive to light. No scleral icterus.   Neck: Normal range of motion.   Cardiovascular: Normal rate, regular rhythm and normal heart sounds.    Pulmonary/Chest: Effort normal. No respiratory distress. He has no wheezes.   Coarse BS   Abdominal: Soft. Bowel sounds are normal. He exhibits distension (ascites). There is no tenderness.   Chevron inc cdi no ssi   Musculoskeletal: He exhibits edema (3+ BLE).   Neurological:  He is alert and oriented to person, place, and time.   Skin: Skin is warm and dry. Capillary refill takes 2 to 3 seconds. He is not diaphoretic.   Psychiatric: He has a normal mood and affect. His behavior is normal. Judgment and thought content normal.   Nursing note and vitals reviewed.      Laboratory:  Immunosuppressants         Stop Route Frequency     tacrolimus capsule 4 mg      -- Oral 2 times daily     mycophenolate capsule 1,000 mg      -- Oral 2 times daily        CBC:     Recent Labs  Lab 08/10/18  0400   WBC 4.65   RBC 2.70*   HGB 8.5*   HCT 26.7*   *   MCV 99*   MCH 31.5*   MCHC 31.8*     CMP:     Recent Labs  Lab 08/10/18  0400   *   CALCIUM 8.7   ALBUMIN 2.8*   PROT 4.7*   *   K 5.0   CO2 24      BUN 56*   CREATININE 4.1*   ALKPHOS 64   ALT 16   AST 11   BILITOT 0.5     Coagulation:   No results for input(s): PT, INR, APTT in the last 168 hours.    Labs within the past 24 hours have been reviewed.    Diagnostic Results:  None

## 2018-08-10 NOTE — PT/OT/SLP PROGRESS
Physical Therapy Treatment    Patient Name:  Alon Adamson   MRN:  [id number]    Recommendations:     Discharge Recommendations:  nursing facility, skilled   Discharge Equipment Recommendations:  (TBD)   Barriers to discharge: Inaccessible home and Decreased caregiver support    Assessment:     Alon Adamson is a 63 y.o. male admitted with a medical diagnosis of Liver transplanted.  He presents with the following impairments/functional limitations:  weakness, impaired functional mobilty, gait instability, impaired endurance, impaired balance, impaired self care skills, decreased lower extremity function, decreased upper extremity function, decreased ROM, edema, impaired cardiopulmonary response to activity.  Pt tolerated session c c/o fatigue.  Pt required max encouragement to participate c therapy and required mod cues throughout session.  Performed bed mobility c CGA-SBA, transfer c mod A and gait c CGA using RW.  Pt only able to take few steps along EOB on this session - demo good weight shift and toe-floor clearance.  Pt demo AYALA c transfer/gait training.  Pt requiring frequent redirection to task at hand and education of proper techniques for transfers.  Pt c frequent request to be placed in WC.  Pt has been showing progress towards being able to transfer to/from WC but currently still unsafe to perform task.  Pt would benefit from continued skilled acute PT 4x/wk to improve functional mobility.    Pt safe to transfer EOB c assistance from nsg (CGA) - encouraged to sit EOB at least 3x/day.    Rehab Prognosis:  good; patient would benefit from acute skilled PT services to address these deficits and reach maximum level of function.      Recent Surgery: Procedure(s) (LRB):  TRANSPLANT, LIVER (N/A) 18 Days Post-Op    Plan:     During this hospitalization, patient to be seen 4 x/week to address the above listed problems via gait training, therapeutic activities, therapeutic exercises, neuromuscular  "re-education  · Plan of Care Expires:  08/26/18   Plan of Care Reviewed with: patient, mother    Subjective     Communicated with RN and OT prior to session.  Patient found supine and mother present upon PT entry to room, agreeable to treatment.      Chief Complaint: fatigue; weakness  Patient comments/goals: "If I can just get into the WC I would be able to use my arms and legs to get around and get stronger." "The bed is too low for me. It needs to be higher so I can get up without straining." "I can do it with all the pillows in the chair." "No one ever listens to me." - when educating pt on purpose of transferring from a lower surface to mimic the transfer he will have to make to get to/from WC   Pain/Comfort:  · Pain Rating 1: 0/10    Patients cultural, spiritual, Scientologist conflicts given the current situation: none    Objective:     Patient found with: telemetry, central line     General Precautions: Standard, fall   Orthopedic Precautions:N/A   Braces: N/A     Functional Mobility:  · Bed Mobility:     · Rolling Left:  stand by assistance  · Rolling Right: stand by assistance  · Scooting: contact guard assistance towards EOB in sitting 4x  · Supine to Sit: contact guard assistance  · Sit to Supine: stand by assistance  · Transfers:     · Sit to Stand:  moderate assistance with rolling walker   · 2x from bed in lowest position  · Gait: 5 lateral R steps, 2 lateral R steps c RW CGA  · Good weight-shifting and toe-floor clearance, c/o fatigue, AYALA  · Balance: dynamic standing (CGA)      AM-PAC 6 CLICK MOBILITY  Turning over in bed (including adjusting bedclothes, sheets and blankets)?: 3  Sitting down on and standing up from a chair with arms (e.g., wheelchair, bedside commode, etc.): 2  Moving from lying on back to sitting on the side of the bed?: 3  Moving to and from a bed to a chair (including a wheelchair)?: 3  Need to walk in hospital room?: 2  Climbing 3-5 steps with a railing?: 1  Basic Mobility Total " Score: 14       Therapeutic Activities and Exercises:  Educated on safety c mobility; benefits of OOB activities; importance of sitting EOB and performing therex; progress; unsafe to transfer to/from  at this time d/t weakness; reasoning behind performing transfer from lower surface - v/u  Mobility as stated above  Static standing 2x1min  Weight shifting in standing 1x10 ea side  Bed mobility for susanna-hygiene as pt soiled self prior to therapy session  Sat EOB [id number] able to accept challenges    Patient left HOB elevated with all lines intact, call button in reach, RN notified and mother present..    GOALS:    Physical Therapy Goals        Problem: Physical Therapy Goal    Goal Priority Disciplines Outcome Goal Variances Interventions   Physical Therapy Goal     PT/OT, PT Ongoing (interventions implemented as appropriate)     Description:  Goals to be met by: 2018    Patient will increase functional independence with mobility by performin. Supine to sit with moderate assistance -  Met  Updated: Supine to sit with Supervision  2. Sit to stand with moderate assistance - met (2018)  Updated: Sit to stand with minimum assistance using rolling walker  3. Gait x 50ft with minimal assistance with RW. - not met  4. Pt will be able to perform hip flexion, hip abduction, and heel raises while standing with use of rolling walker (denoting improvement in balance)- Min A. Not met                                  Time Tracking:     PT Received On: 08/10/18  PT Start Time: 1339     PT Stop Time: 1413  PT Total Time (min): 34 min     Billable Minutes: Gait Training 10 mins and Therapeutic Activity 15 mins - co treat c OT    Treatment Type: Treatment  PT/PTA: PT     PTA Visit Number: 0     Mckinley Senior, PT  08/10/2018

## 2018-08-10 NOTE — PLAN OF CARE
Problem: Physical Therapy Goal  Goal: Physical Therapy Goal  Goals to be met by: 2018    Patient will increase functional independence with mobility by performin. Supine to sit with moderate assistance -  Met  Updated: Supine to sit with Supervision  2. Sit to stand with moderate assistance - met (2018)  Updated: Sit to stand with minimum assistance using rolling walker  3. Gait x 50ft with minimal assistance with RW. - not met  4. Pt will be able to perform hip flexion, hip abduction, and heel raises while standing with use of rolling walker (denoting improvement in balance)- Min A. Not met                 Outcome: Ongoing (interventions implemented as appropriate)  Progressing towards goals    Mckinley Senior DPT  8/10/2018

## 2018-08-11 PROBLEM — R00.1 BRADYCARDIA: Status: ACTIVE | Noted: 2018-08-11

## 2018-08-11 LAB
ALBUMIN SERPL BCP-MCNC: 2.7 G/DL
ALP SERPL-CCNC: 58 U/L
ALT SERPL W/O P-5'-P-CCNC: 14 U/L
ANION GAP SERPL CALC-SCNC: 8 MMOL/L
AST SERPL-CCNC: 11 U/L
BASOPHILS # BLD AUTO: 0.04 K/UL
BASOPHILS NFR BLD: 1.2 %
BILIRUB SERPL-MCNC: 0.5 MG/DL
BUN SERPL-MCNC: 59 MG/DL
CALCIUM SERPL-MCNC: 8.5 MG/DL
CHLORIDE SERPL-SCNC: 105 MMOL/L
CO2 SERPL-SCNC: 22 MMOL/L
CREAT SERPL-MCNC: 4 MG/DL
DIFFERENTIAL METHOD: ABNORMAL
EOSINOPHIL # BLD AUTO: 0 K/UL
EOSINOPHIL NFR BLD: 0.9 %
ERYTHROCYTE [DISTWIDTH] IN BLOOD BY AUTOMATED COUNT: 17.8 %
EST. GFR  (AFRICAN AMERICAN): 17.4 ML/MIN/1.73 M^2
EST. GFR  (NON AFRICAN AMERICAN): 15 ML/MIN/1.73 M^2
GLUCOSE SERPL-MCNC: 108 MG/DL
HCT VFR BLD AUTO: 25.1 %
HGB BLD-MCNC: 8 G/DL
IMM GRANULOCYTES # BLD AUTO: 0.02 K/UL
IMM GRANULOCYTES NFR BLD AUTO: 0.6 %
LYMPHOCYTES # BLD AUTO: 0.4 K/UL
LYMPHOCYTES NFR BLD: 12.8 %
MAGNESIUM SERPL-MCNC: 1.7 MG/DL
MCH RBC QN AUTO: 31.9 PG
MCHC RBC AUTO-ENTMCNC: 31.9 G/DL
MCV RBC AUTO: 100 FL
MONOCYTES # BLD AUTO: 0.4 K/UL
MONOCYTES NFR BLD: 12.2 %
NEUTROPHILS # BLD AUTO: 2.5 K/UL
NEUTROPHILS NFR BLD: 72.3 %
NRBC BLD-RTO: 0 /100 WBC
PHOSPHATE SERPL-MCNC: 4.9 MG/DL
PLATELET # BLD AUTO: 94 K/UL
PMV BLD AUTO: 10.3 FL
POCT GLUCOSE: 108 MG/DL (ref 70–110)
POCT GLUCOSE: 118 MG/DL (ref 70–110)
POCT GLUCOSE: 137 MG/DL (ref 70–110)
POCT GLUCOSE: 145 MG/DL (ref 70–110)
POTASSIUM SERPL-SCNC: 5.3 MMOL/L
PROT SERPL-MCNC: 4.5 G/DL
RBC # BLD AUTO: 2.51 M/UL
SODIUM SERPL-SCNC: 135 MMOL/L
TACROLIMUS BLD-MCNC: 7 NG/ML
WBC # BLD AUTO: 3.45 K/UL

## 2018-08-11 PROCEDURE — 63600175 PHARM REV CODE 636 W HCPCS: Mod: JG | Performed by: PHYSICIAN ASSISTANT

## 2018-08-11 PROCEDURE — 63600175 PHARM REV CODE 636 W HCPCS: Performed by: STUDENT IN AN ORGANIZED HEALTH CARE EDUCATION/TRAINING PROGRAM

## 2018-08-11 PROCEDURE — 25000003 PHARM REV CODE 250: Performed by: STUDENT IN AN ORGANIZED HEALTH CARE EDUCATION/TRAINING PROGRAM

## 2018-08-11 PROCEDURE — 94664 DEMO&/EVAL PT USE INHALER: CPT

## 2018-08-11 PROCEDURE — 20600001 HC STEP DOWN PRIVATE ROOM

## 2018-08-11 PROCEDURE — 63600175 PHARM REV CODE 636 W HCPCS: Performed by: SURGERY

## 2018-08-11 PROCEDURE — 80053 COMPREHEN METABOLIC PANEL: CPT

## 2018-08-11 PROCEDURE — 99900035 HC TECH TIME PER 15 MIN (STAT)

## 2018-08-11 PROCEDURE — 25000003 PHARM REV CODE 250: Performed by: PHYSICIAN ASSISTANT

## 2018-08-11 PROCEDURE — 84100 ASSAY OF PHOSPHORUS: CPT

## 2018-08-11 PROCEDURE — 85025 COMPLETE CBC W/AUTO DIFF WBC: CPT

## 2018-08-11 PROCEDURE — 94761 N-INVAS EAR/PLS OXIMETRY MLT: CPT

## 2018-08-11 PROCEDURE — 27000646 HC AEROBIKA DEVICE

## 2018-08-11 PROCEDURE — 63600175 PHARM REV CODE 636 W HCPCS: Performed by: NURSE PRACTITIONER

## 2018-08-11 PROCEDURE — 83735 ASSAY OF MAGNESIUM: CPT

## 2018-08-11 PROCEDURE — 99233 SBSQ HOSP IP/OBS HIGH 50: CPT | Mod: 24,,, | Performed by: NURSE PRACTITIONER

## 2018-08-11 PROCEDURE — 25000003 PHARM REV CODE 250: Performed by: SURGERY

## 2018-08-11 PROCEDURE — 63600175 PHARM REV CODE 636 W HCPCS: Performed by: INTERNAL MEDICINE

## 2018-08-11 PROCEDURE — 25000003 PHARM REV CODE 250: Performed by: NURSE PRACTITIONER

## 2018-08-11 PROCEDURE — 80197 ASSAY OF TACROLIMUS: CPT

## 2018-08-11 RX ORDER — FUROSEMIDE 10 MG/ML
80 INJECTION INTRAMUSCULAR; INTRAVENOUS 2 TIMES DAILY
Status: DISCONTINUED | OUTPATIENT
Start: 2018-08-11 | End: 2018-08-17

## 2018-08-11 RX ADMIN — DOCUSATE SODIUM 100 MG: 100 CAPSULE, LIQUID FILLED ORAL at 08:08

## 2018-08-11 RX ADMIN — FUROSEMIDE 80 MG: 10 INJECTION, SOLUTION INTRAMUSCULAR; INTRAVENOUS at 11:08

## 2018-08-11 RX ADMIN — PANTOPRAZOLE SODIUM 40 MG: 40 TABLET, DELAYED RELEASE ORAL at 08:08

## 2018-08-11 RX ADMIN — TACROLIMUS 4 MG: 1 CAPSULE ORAL at 08:08

## 2018-08-11 RX ADMIN — INSULIN ASPART 6 UNITS: 100 INJECTION, SOLUTION INTRAVENOUS; SUBCUTANEOUS at 08:08

## 2018-08-11 RX ADMIN — INSULIN ASPART 6 UNITS: 100 INJECTION, SOLUTION INTRAVENOUS; SUBCUTANEOUS at 01:08

## 2018-08-11 RX ADMIN — FUROSEMIDE 80 MG: 10 INJECTION, SOLUTION INTRAMUSCULAR; INTRAVENOUS at 05:08

## 2018-08-11 RX ADMIN — MICONAZOLE NITRATE: 20 OINTMENT TOPICAL at 09:08

## 2018-08-11 RX ADMIN — ACYCLOVIR 400 MG: 200 CAPSULE ORAL at 08:08

## 2018-08-11 RX ADMIN — PANTOPRAZOLE SODIUM 40 MG: 40 TABLET, DELAYED RELEASE ORAL at 09:08

## 2018-08-11 RX ADMIN — TACROLIMUS 4 MG: 1 CAPSULE ORAL at 05:08

## 2018-08-11 RX ADMIN — SIMETHICONE CHEW TAB 80 MG 80 MG: 80 TABLET ORAL at 04:08

## 2018-08-11 RX ADMIN — ATORVASTATIN CALCIUM 40 MG: 20 TABLET, FILM COATED ORAL at 08:08

## 2018-08-11 RX ADMIN — ONDANSETRON 4 MG: 2 INJECTION INTRAMUSCULAR; INTRAVENOUS at 08:08

## 2018-08-11 RX ADMIN — FLUCONAZOLE 200 MG: 200 TABLET ORAL at 08:08

## 2018-08-11 RX ADMIN — INSULIN ASPART 6 UNITS: 100 INJECTION, SOLUTION INTRAVENOUS; SUBCUTANEOUS at 04:08

## 2018-08-11 RX ADMIN — ACYCLOVIR 400 MG: 200 CAPSULE ORAL at 09:08

## 2018-08-11 RX ADMIN — RAMELTEON 8 MG: 8 TABLET, FILM COATED ORAL at 09:08

## 2018-08-11 RX ADMIN — HEPARIN SODIUM 5000 UNITS: 5000 INJECTION, SOLUTION INTRAVENOUS; SUBCUTANEOUS at 02:08

## 2018-08-11 RX ADMIN — LEVETIRACETAM 500 MG: 500 TABLET ORAL at 08:08

## 2018-08-11 RX ADMIN — SIMETHICONE CHEW TAB 80 MG 80 MG: 80 TABLET ORAL at 09:08

## 2018-08-11 RX ADMIN — PREDNISONE 15 MG: 5 TABLET ORAL at 08:08

## 2018-08-11 RX ADMIN — LEVETIRACETAM 500 MG: 500 TABLET ORAL at 09:08

## 2018-08-11 RX ADMIN — MYCOPHENOLATE MOFETIL 1000 MG: 250 CAPSULE ORAL at 08:08

## 2018-08-11 RX ADMIN — ERYTHROPOIETIN 10000 UNITS: 10000 INJECTION, SOLUTION INTRAVENOUS; SUBCUTANEOUS at 11:08

## 2018-08-11 RX ADMIN — HEPARIN SODIUM 5000 UNITS: 5000 INJECTION, SOLUTION INTRAVENOUS; SUBCUTANEOUS at 05:08

## 2018-08-11 RX ADMIN — HEPARIN SODIUM 5000 UNITS: 5000 INJECTION, SOLUTION INTRAVENOUS; SUBCUTANEOUS at 09:08

## 2018-08-11 RX ADMIN — MYCOPHENOLATE MOFETIL 1000 MG: 250 CAPSULE ORAL at 09:08

## 2018-08-11 RX ADMIN — MICONAZOLE NITRATE: 20 OINTMENT TOPICAL at 08:08

## 2018-08-11 NOTE — PLAN OF CARE
BG goal at goal since yesterday. Has not required correction scale. No hypoglycemic episodes.      1. Steroid induced hyperglycemia  --continue aspart 6U TID w M  --continue to monitor BG AC/HS and low dose insulin correction scale     Discharge needs: tbd  Will follow along, please call if you have any questions.

## 2018-08-11 NOTE — ASSESSMENT & PLAN NOTE
- LFTs/enzymes normalized.  - POD 1 US with elevated RIs.  - Repeat liver U/S with stable, hyperechoic liver lesion, fluid collection inferior smaller, RIs persistently mildly elevated and mild to mod ascites.  Monitor.

## 2018-08-11 NOTE — ASSESSMENT & PLAN NOTE
- cont to have significant scrotal edema and abdominal swelling.  Nephrology following.  Last HD 8/3/18.  Received Lasix 120 mg on 8/9.  Pt unable to use urinal to measure output due to edema.    - Lasix 80 mg bid ordered per Nephrology recs.  - Cr 4 from 4.1. Monitor.

## 2018-08-11 NOTE — SUBJECTIVE & OBJECTIVE
Scheduled Meds:   acyclovir  400 mg Oral BID    atorvastatin  40 mg Oral Daily    bisacodyl  10 mg Rectal Daily    docusate sodium  100 mg Oral Daily    epoetin dread  10,000 Units Intravenous Every Tues, Thurs, Sat    ergocalciferol  50,000 Units Oral Q7 Days    fluconazole  200 mg Oral Daily    furosemide  80 mg Intravenous BID    heparin (porcine)  5,000 Units Subcutaneous Q8H    insulin aspart U-100  6 Units Subcutaneous TIDWM    levETIRAcetam  500 mg Oral BID    miconazole nitrate 2%   Topical (Top) BID    mycophenolate  1,000 mg Oral BID    pantoprazole  40 mg Oral BID    [START ON 8/12/2018] predniSONE  10 mg Oral Daily    sulfamethoxazole-trimethoprim 400-80mg  1 tablet Oral Every Mon, Wed, Fri    tacrolimus  4 mg Oral BID    triamcinolone acetonide 0.1%   Topical (Top) BID     Continuous Infusions:  PRN Meds:acetaminophen, dextrose 50%, dextrose 50%, gelatin adsorbable 100cm top sponge, glucagon (human recombinant), glucose, glucose, insulin aspart U-100, ondansetron, oxyCODONE, ramelteon, simethicone, sodium chloride 0.9%, traMADol    Review of Systems   Constitutional: Positive for activity change, appetite change and fatigue. Negative for chills, diaphoresis and fever.   HENT: Negative for congestion and facial swelling.    Eyes: Negative for pain, discharge and visual disturbance.   Respiratory: Negative for cough, chest tightness, shortness of breath and wheezing.    Cardiovascular: Positive for leg swelling. Negative for chest pain and palpitations.   Gastrointestinal: Positive for abdominal distention. Negative for abdominal pain, constipation, diarrhea, nausea and vomiting.   Endocrine: Negative.    Genitourinary: Negative for decreased urine volume (Improving- unable to measure).   Musculoskeletal: Negative for back pain.   Skin: Positive for wound.   Allergic/Immunologic: Positive for immunocompromised state.   Neurological: Positive for weakness. Negative for dizziness, tremors  and headaches.   Hematological: Negative.    Psychiatric/Behavioral: Negative for agitation, dysphoric mood and sleep disturbance. The patient is not nervous/anxious.      Objective:     Vital Signs (Most Recent):  Temp: 98.3 °F (36.8 °C) (08/11/18 0800)  Pulse: 64 (08/11/18 0800)  Resp: 18 (08/11/18 0800)  BP: 124/60 (08/11/18 0800)  SpO2: 95 % (08/11/18 0809) Vital Signs (24h Range):  Temp:  [98.1 °F (36.7 °C)-98.6 °F (37 °C)] 98.3 °F (36.8 °C)  Pulse:  [57-80] 64  Resp:  [16-20] 18  SpO2:  [95 %-97 %] 95 %  BP: (124-150)/(58-66) 124/60     Weight: 133.1 kg (293 lb 6.9 oz)  Body mass index is 37.67 kg/m².    Intake/Output - Last 3 Shifts       08/09 0700 - 08/10 0659 08/10 0700 - 08/11 0659 08/11 0700 - 08/12 0659    P.O. 1460 1050 420    I.V. (mL/kg) 0 (0) 0 (0)     Other 0 0     Total Intake(mL/kg) 1460 (11.1) 1050 (7.9) 420 (3.2)    Urine (mL/kg/hr) 0 (0) 175 (0.1)     Emesis/NG output 0 (0) 0 (0)     Other 0 (0) 0 (0)     Stool 0 (0) 0 (0)     Blood 0 (0) 0 (0)     Total Output 0 175      Net +1460 +875 +420           Urine Occurrence 13 x 6 x     Stool Occurrence 0 x 2 x     Emesis Occurrence 0 x 0 x           Physical Exam   Constitutional: He is oriented to person, place, and time. He appears well-developed. No distress.   Temporal and distal extremity muscle wasting   HENT:   Head: Normocephalic and atraumatic.   Eyes: EOM are normal. Pupils are equal, round, and reactive to light. No scleral icterus.   Neck: Normal range of motion.   Cardiovascular: Normal rate, regular rhythm and normal heart sounds.    Pulmonary/Chest: Effort normal. No respiratory distress. He has no wheezes.   Coarse BS   Abdominal: Soft. Bowel sounds are normal. He exhibits distension (ascites). There is no tenderness.   Chevron inc cdi no ssi   Musculoskeletal: He exhibits edema (3+ BLE).   Neurological: He is alert and oriented to person, place, and time.   Skin: Skin is warm and dry. Capillary refill takes 2 to 3 seconds. He is  not diaphoretic.   Psychiatric: He has a normal mood and affect. His behavior is normal. Judgment and thought content normal.   Nursing note and vitals reviewed.      Laboratory:  Immunosuppressants         Stop Route Frequency     tacrolimus capsule 4 mg      -- Oral 2 times daily     mycophenolate capsule 1,000 mg      -- Oral 2 times daily        CBC:   Recent Labs  Lab 08/11/18  0500   WBC 3.45*   RBC 2.51*   HGB 8.0*   HCT 25.1*   PLT 94*   *   MCH 31.9*   MCHC 31.9*     CMP:   Recent Labs  Lab 08/11/18  0500      CALCIUM 8.5*   ALBUMIN 2.7*   PROT 4.5*   *   K 5.3*   CO2 22*      BUN 59*   CREATININE 4.0*   ALKPHOS 58   ALT 14   AST 11   BILITOT 0.5     Labs within the past 24 hours have been reviewed.    Diagnostic Results:  I have personally reviewed all pertinent imaging studies.

## 2018-08-11 NOTE — ASSESSMENT & PLAN NOTE
- With CKD3.  - Expected VICKY post txp.  - UOP unclear as pt unable to measure urine (severe scrotal edema- improving) though feels UOP improving.  - Tolerated HD 8/1 and 8/3.   - Nephrology following and HD per their recs.   - Lasix 120 mg given 8/9/18.    - Cr 4 from 4.1.  - Lasix 80 mg bid.

## 2018-08-11 NOTE — ASSESSMENT & PLAN NOTE
- Post transplant required blood transfusion 8/1 for H&H 6.6/20.3; 8/2 for H&H 6.4/19.7.  - Epo started on 8/3.  - PRBC 1 unit last transfused 8/5.  - H&H remains stable.

## 2018-08-11 NOTE — PLAN OF CARE
Problem: Patient Care Overview  Goal: Plan of Care Review  Outcome: Ongoing (interventions implemented as appropriate)  Patient aaox4. Bed kept locked, lowest position with 2x side rails up while in bed. Family at bedside, attentive to patient's needs. Call bell and personal items within reach. q2hr turning.  Regular diet. Poor appetite, eating encouraged. accuchecks ac/hs, insulin given as ordered. Telemetry monitoring, SR. Abdomen distended; patient with complaints of gas. Generalized and dependent edema. Right arm midline cdi, dressing changed today. Right IJ cdi. Lasix 80mg, BID. Complaints of nausea this morning, given prn IV zofran with moderate relief.  Wife at bedside did self medications.  Wound care to legs on tues./friday. Standard precautions maintained.

## 2018-08-11 NOTE — PROGRESS NOTES
[facility]  Liver Transplant  Progress Note    Patient Name: Alon Adamson  MRN: [id number]  Admission Date: 2018  Hospital Length of Stay: 50 days  Code Status: Full Code  Primary Care Provider: Mckinley Vides MD  Post-Operative Day: 19    ORGAN:   LIVER  Disease Etiology: Alcoholic Cirrhosis  Donor Type:    - Brain Death  CDC High Risk:   No  Donor CMV Status:   Donor CMV Status: Negative  Donor HBcAB:   Negative  Donor HCV Status:   Negative  Donor HBV TAMAR: Negative  Donor HCV TAMAR: Negative  Whole or Partial: Whole Liver  Biliary Anastomosis: End to End  Arterial Anatomy: Standard  Subjective:     History of Present Illness:  Mr. Adamson is a 64 yo M with PMH ESLD secondary to ETOH cirrhosis, CKD3, CAD.  Complications of liver disease include hyperbilirubinemia, hypoalbuminemia, severe malnutrition,   hepatic encephalopathy, thrombocytopenia, splenomegaly, ascites, hypervolemia, coagulopathy, portal HTN.  Pt admitted  for acutely worsening hepatic encephalopathy and concern for BLE cellulitis as well as severe volume overload.  Complications of ELSD managed while inpt and he received liver offer on 18.  Pt underwent liver transplant without complication: steroid induction, DBD, CMV -/-.  Donor with E coli bacteremia resistant to cipro placed on Rocephin x 2 weeks per ID recs.  Also on fluconazole x 30 days as pt very ill prior to transplant. Post transplant, with nice trend down of AST/ALT and LFTs.  Liver U/S with elevated RIs and decreased HAV.  During initial post operative period, pt required pressor support.  Pt with expected post operative VICKY as with VICKY prior to surgery.  Nephrology consulted and pt placed on intermittent CRRT.  Pressor requirement decreased and pt tolerated CRRT without pressors.  Pt with asymptomatic bradycardia while in ICU prompting cardiology consult. Pt without need for acute intervention but atropine placed at bedside.  HR has improved POD  7 and 8.  He was transferred to the TSU 7/30 PM.          Liver US 7/31 showed small fluid collection likely an adrenal hemorrhage vs hematoma. H&H decreased to 6.6/20.3 8/1- responded to 1 u PRBCs. H/H stable 8/2 AM. Dropped again 8/3 AM- 1 u PRBC given. H&H now stable. Iron studies checked and pt started on epo.     Hospital Course:  Interval history: pt didn't sleep well overnight. Otherwise he is feeling ok today. With c/o abdominal pain, gas and bloating yesterday which were relieved with gas x. Pt reported BM x 2. He still c/o abdominal fullness but it is improving. Encouraged pt to participate with therapy daily. Cr 4 from 4.1. Last HD 8/3. Nephrology recs Lasix 80 mg IV bid. LFTs stable. Monitor.    Scheduled Meds:   acyclovir  400 mg Oral BID    atorvastatin  40 mg Oral Daily    bisacodyl  10 mg Rectal Daily    docusate sodium  100 mg Oral Daily    epoetin dread  10,000 Units Intravenous Every Tues, Thurs, Sat    ergocalciferol  50,000 Units Oral Q7 Days    fluconazole  200 mg Oral Daily    furosemide  80 mg Intravenous BID    heparin (porcine)  5,000 Units Subcutaneous Q8H    insulin aspart U-100  6 Units Subcutaneous TIDWM    levETIRAcetam  500 mg Oral BID    miconazole nitrate 2%   Topical (Top) BID    mycophenolate  1,000 mg Oral BID    pantoprazole  40 mg Oral BID    [START ON 8/12/2018] predniSONE  10 mg Oral Daily    sulfamethoxazole-trimethoprim 400-80mg  1 tablet Oral Every Mon, Wed, Fri    tacrolimus  4 mg Oral BID    triamcinolone acetonide 0.1%   Topical (Top) BID     Continuous Infusions:  PRN Meds:acetaminophen, dextrose 50%, dextrose 50%, gelatin adsorbable 100cm top sponge, glucagon (human recombinant), glucose, glucose, insulin aspart U-100, ondansetron, oxyCODONE, ramelteon, simethicone, sodium chloride 0.9%, traMADol    Review of Systems   Constitutional: Positive for activity change, appetite change and fatigue. Negative for chills, diaphoresis and fever.   HENT:  Negative for congestion and facial swelling.    Eyes: Negative for pain, discharge and visual disturbance.   Respiratory: Negative for cough, chest tightness, shortness of breath and wheezing.    Cardiovascular: Positive for leg swelling. Negative for chest pain and palpitations.   Gastrointestinal: Positive for abdominal distention. Negative for abdominal pain, constipation, diarrhea, nausea and vomiting.   Endocrine: Negative.    Genitourinary: Negative for decreased urine volume (Improving- unable to measure).   Musculoskeletal: Negative for back pain.   Skin: Positive for wound.   Allergic/Immunologic: Positive for immunocompromised state.   Neurological: Positive for weakness. Negative for dizziness, tremors and headaches.   Hematological: Negative.    Psychiatric/Behavioral: Negative for agitation, dysphoric mood and sleep disturbance. The patient is not nervous/anxious.      Objective:     Vital Signs (Most Recent):  Temp: 98.3 °F (36.8 °C) (08/11/18 0800)  Pulse: 64 (08/11/18 0800)  Resp: 18 (08/11/18 0800)  BP: 124/60 (08/11/18 0800)  SpO2: 95 % (08/11/18 0809) Vital Signs (24h Range):  Temp:  [98.1 °F (36.7 °C)-98.6 °F (37 °C)] 98.3 °F (36.8 °C)  Pulse:  [57-80] 64  Resp:  [16-20] 18  SpO2:  [95 %-97 %] 95 %  BP: (124-150)/(58-66) 124/60     Weight: 133.1 kg (293 lb 6.9 oz)  Body mass index is 37.67 kg/m².    Intake/Output - Last 3 Shifts       08/09 0700 - 08/10 0659 08/10 0700 - 08/11 0659 08/11 0700 - 08/12 0659    P.O. 1460 1050 420    I.V. (mL/kg) 0 (0) 0 (0)     Other 0 0     Total Intake(mL/kg) 1460 (11.1) 1050 (7.9) 420 (3.2)    Urine (mL/kg/hr) 0 (0) 175 (0.1)     Emesis/NG output 0 (0) 0 (0)     Other 0 (0) 0 (0)     Stool 0 (0) 0 (0)     Blood 0 (0) 0 (0)     Total Output 0 175      Net +1460 +875 +420           Urine Occurrence 13 x 6 x     Stool Occurrence 0 x 2 x     Emesis Occurrence 0 x 0 x           Physical Exam   Constitutional: He is oriented to person, place, and time. He appears  well-developed. No distress.   Temporal and distal extremity muscle wasting   HENT:   Head: Normocephalic and atraumatic.   Eyes: EOM are normal. Pupils are equal, round, and reactive to light. No scleral icterus.   Neck: Normal range of motion.   Cardiovascular: Normal rate, regular rhythm and normal heart sounds.    Pulmonary/Chest: Effort normal. No respiratory distress. He has no wheezes.   Coarse BS   Abdominal: Soft. Bowel sounds are normal. He exhibits distension (ascites). There is no tenderness.   Chevron inc cdi no ssi   Musculoskeletal: He exhibits edema (3+ BLE).   Neurological: He is alert and oriented to person, place, and time.   Skin: Skin is warm and dry. Capillary refill takes 2 to 3 seconds. He is not diaphoretic.   Psychiatric: He has a normal mood and affect. His behavior is normal. Judgment and thought content normal.   Nursing note and vitals reviewed.      Laboratory:  Immunosuppressants         Stop Route Frequency     tacrolimus capsule 4 mg      -- Oral 2 times daily     mycophenolate capsule 1,000 mg      -- Oral 2 times daily        CBC:   Recent Labs  Lab 08/11/18  0500   WBC 3.45*   RBC 2.51*   HGB 8.0*   HCT 25.1*   PLT 94*   *   MCH 31.9*   MCHC 31.9*     CMP:   Recent Labs  Lab 08/11/18  0500      CALCIUM 8.5*   ALBUMIN 2.7*   PROT 4.5*   *   K 5.3*   CO2 22*      BUN 59*   CREATININE 4.0*   ALKPHOS 58   ALT 14   AST 11   BILITOT 0.5     Labs within the past 24 hours have been reviewed.    Diagnostic Results:  I have personally reviewed all pertinent imaging studies.    Assessment/Plan:     * Liver transplanted    - LFTs/enzymes normalized.  - POD 1 US with elevated RIs.  - Repeat liver U/S with stable, hyperechoic liver lesion, fluid collection inferior smaller, RIs persistently mildly elevated and mild to mod ascites.  Monitor.          Hypervolemia    - cont to have significant scrotal edema and abdominal swelling.  Nephrology following.  Last HD 8/3/18.   Received Lasix 120 mg on 8/9.  Pt unable to use urinal to measure output due to edema.    - Lasix 80 mg bid ordered per Nephrology recs.  - Cr 4 from 4.1. Monitor.           Long-term use of immunosuppressant medication    - see prophylactic immuno.           Prophylactic immunotherapy    - continue prograf, cellcept and prednisone. Monitor labs daily and adjust dose accordingly for a therapeutic level.           At risk for opportunistic infections    - Opportunistic infection prophylaxis will include Acyclovir for 3 months (CMV D- R-), Bactrim for 6 months, and Fluconazole.          Sinus bradycardia    - Improved.  - Atropine at bedside if HR decreases again.          Steroid-induced hyperglycemia    - Endocrine consulted.          CKD (chronic kidney disease), stage III    - See VICKY.          Stage II pressure ulcer of sacral region    - Wound care following.          Severe protein-calorie malnutrition    - Dietary consulted.  - Tolerating 2-3 supplements daily.  - Pt eating well.        Physical deconditioning    - SLOW to improve with PT/OT.  - Will place SNF consult once medically ready.        Pancytopenia    - Related to decompensated liver disease --> improving now that pt transplanted.        Alteration in skin integrity    - Needs aggressive therapy and OOB.  - wd care changing dressing to legs weekly.            Acute kidney injury superimposed on chronic kidney disease    - With CKD3.  - Expected VICKY post txp.  - UOP unclear as pt unable to measure urine (severe scrotal edema- improving) though feels UOP improving.  - Tolerated HD 8/1 and 8/3.   - Nephrology following and HD per their recs.   - Lasix 120 mg given 8/9/18.    - Cr 4 from 4.1.  - Lasix 80 mg bid.        Decompensated hepatic cirrhosis    - Placed status 7 on 6/26 for prescription drug coverage change. Re-activated 7/3. Back on hold d/t frailty and decompensation at the end of last week prompting ICU transfer. Now off hold 7/17.  Plan to  submit MELD exception points. Will remeld today at 27.       MELD-Na score: 22 at 8/2/2018  4:30 AM  MELD score: 18 at 8/2/2018  4:30 AM  Calculated from:  Serum Creatinine: 2.8 mg/dL at 8/2/2018  4:30 AM  Serum Sodium: 132 mmol/L at 8/2/2018  4:30 AM  Total Bilirubin: 0.7 mg/dL (Rounded to 1) at 8/2/2018  4:30 AM  INR(ratio): 1.2 at 7/31/2018  5:00 AM  Age: 63 years        Coronary artery disease involving native coronary artery of native heart without angina pectoris    - 2D Echo 7/9 normal EF (55-60%), trivial tricuspid/mitral regurg.  - Will need to assess when to resume home ASA.         Anemia of chronic disease    - Post transplant required blood transfusion 8/1 for H&H 6.6/20.3; 8/2 for H&H 6.4/19.7.  - Epo started on 8/3.  - PRBC 1 unit last transfused 8/5.  - H&H remains stable.        Thrombocytopenia    - Secondary to splenomegaly from portal HTN- improving post transplant.        Hypoalbuminemia due to protein-calorie malnutrition    - appetite fair. He is drinking 2-3 supplements daily.  Pt states he is trying to increase protein in diet.            Seizures    - Continue oral Keppra.  - Keep Mg > 2.  On PO Mag.        Bilateral edema of lower extremity    - Was on intermittent dialysis prior to transplant  - Tolerated HD well 8/1. Last HD 8/3.   - Wrapped by wound care- last changed 8/10.  Significant improvement.        Hepatic encephalopathy    - ESLD secondary to EtOH cirrhosis with severe complications (hyperbilirubinemia, hypoalbuminemia, severe malnutrition, hepatic encephalopathy, thrombocytopenia, splenomegaly, ascites, hypervolemia, coaguloopathy, portal HTN), seizure disorder on Keppra, CKD3, CAD   - PRIOR to transplant- had frequent episodes of lethargy/confusion/slowing when holding lactulose   - admitted to SICU 7/13/18 for worsening encephalopathy and hypothermia  - PO lactulose TID, PRN lactulose enemas, Rifaximin  - Now s/p liver transplant- mental status improving daily.               VTE Risk Mitigation         Ordered     heparin (porcine) injection 5,000 Units  Every 8 hours      07/26/18 1330     Place sequential compression device  Until discontinued      07/23/18 1326     IP VTE HIGH RISK PATIENT  Once      07/23/18 1242          The patients clinical status was discussed at multidisplinary rounds, involving transplant surgery, transplant medicine, pharmacy, nursing, nutrition, and social work    Discharge Planning: not a candidate for dc at this time.       Daphne Day NP  Liver Transplant  [facility]

## 2018-08-11 NOTE — ASSESSMENT & PLAN NOTE
- Was on intermittent dialysis prior to transplant  - Tolerated HD well 8/1. Last HD 8/3.   - Wrapped by wound care- last changed 8/10.  Significant improvement.

## 2018-08-11 NOTE — ASSESSMENT & PLAN NOTE
- Opportunistic infection prophylaxis will include Acyclovir for 3 months (CMV D- R-), Bactrim for 6 months, and Fluconazole.

## 2018-08-12 PROBLEM — E87.5 HYPERKALEMIA: Status: ACTIVE | Noted: 2018-08-12

## 2018-08-12 LAB
ALBUMIN SERPL BCP-MCNC: 2.5 G/DL
ALP SERPL-CCNC: 59 U/L
ALT SERPL W/O P-5'-P-CCNC: 15 U/L
ANION GAP SERPL CALC-SCNC: 7 MMOL/L
AST SERPL-CCNC: 12 U/L
BASOPHILS # BLD AUTO: 0.04 K/UL
BASOPHILS NFR BLD: 1.3 %
BILIRUB SERPL-MCNC: 0.5 MG/DL
BUN SERPL-MCNC: 58 MG/DL
CALCIUM SERPL-MCNC: 8.7 MG/DL
CHLORIDE SERPL-SCNC: 107 MMOL/L
CO2 SERPL-SCNC: 23 MMOL/L
CREAT SERPL-MCNC: 3.8 MG/DL
DIFFERENTIAL METHOD: ABNORMAL
EOSINOPHIL # BLD AUTO: 0.1 K/UL
EOSINOPHIL NFR BLD: 1.6 %
ERYTHROCYTE [DISTWIDTH] IN BLOOD BY AUTOMATED COUNT: 17.8 %
EST. GFR  (AFRICAN AMERICAN): 18.5 ML/MIN/1.73 M^2
EST. GFR  (NON AFRICAN AMERICAN): 16 ML/MIN/1.73 M^2
GLUCOSE SERPL-MCNC: 98 MG/DL
HCT VFR BLD AUTO: 25.6 %
HGB BLD-MCNC: 8.1 G/DL
IMM GRANULOCYTES # BLD AUTO: 0.01 K/UL
IMM GRANULOCYTES NFR BLD AUTO: 0.3 %
LYMPHOCYTES # BLD AUTO: 0.4 K/UL
LYMPHOCYTES NFR BLD: 13.8 %
MAGNESIUM SERPL-MCNC: 1.6 MG/DL
MCH RBC QN AUTO: 31.6 PG
MCHC RBC AUTO-ENTMCNC: 31.6 G/DL
MCV RBC AUTO: 100 FL
MONOCYTES # BLD AUTO: 0.4 K/UL
MONOCYTES NFR BLD: 12.3 %
NEUTROPHILS # BLD AUTO: 2.3 K/UL
NEUTROPHILS NFR BLD: 70.7 %
NRBC BLD-RTO: 0 /100 WBC
PHOSPHATE SERPL-MCNC: 4.9 MG/DL
PLATELET # BLD AUTO: 101 K/UL
PMV BLD AUTO: 10.4 FL
POCT GLUCOSE: 105 MG/DL (ref 70–110)
POCT GLUCOSE: 118 MG/DL (ref 70–110)
POCT GLUCOSE: 126 MG/DL (ref 70–110)
POCT GLUCOSE: 153 MG/DL (ref 70–110)
POTASSIUM SERPL-SCNC: 5.4 MMOL/L
PROT SERPL-MCNC: 4.4 G/DL
RBC # BLD AUTO: 2.56 M/UL
SODIUM SERPL-SCNC: 137 MMOL/L
TACROLIMUS BLD-MCNC: 7.3 NG/ML
WBC # BLD AUTO: 3.18 K/UL

## 2018-08-12 PROCEDURE — 25000003 PHARM REV CODE 250: Performed by: NURSE PRACTITIONER

## 2018-08-12 PROCEDURE — 25000003 PHARM REV CODE 250: Performed by: STUDENT IN AN ORGANIZED HEALTH CARE EDUCATION/TRAINING PROGRAM

## 2018-08-12 PROCEDURE — 80197 ASSAY OF TACROLIMUS: CPT

## 2018-08-12 PROCEDURE — 25000003 PHARM REV CODE 250: Performed by: SURGERY

## 2018-08-12 PROCEDURE — 25000003 PHARM REV CODE 250: Performed by: PHYSICIAN ASSISTANT

## 2018-08-12 PROCEDURE — 80053 COMPREHEN METABOLIC PANEL: CPT

## 2018-08-12 PROCEDURE — 63600175 PHARM REV CODE 636 W HCPCS: Performed by: NURSE PRACTITIONER

## 2018-08-12 PROCEDURE — 99900035 HC TECH TIME PER 15 MIN (STAT)

## 2018-08-12 PROCEDURE — 84100 ASSAY OF PHOSPHORUS: CPT

## 2018-08-12 PROCEDURE — 85025 COMPLETE CBC W/AUTO DIFF WBC: CPT

## 2018-08-12 PROCEDURE — 63600175 PHARM REV CODE 636 W HCPCS: Performed by: SURGERY

## 2018-08-12 PROCEDURE — 20600001 HC STEP DOWN PRIVATE ROOM

## 2018-08-12 PROCEDURE — 27000646 HC AEROBIKA DEVICE

## 2018-08-12 PROCEDURE — 94761 N-INVAS EAR/PLS OXIMETRY MLT: CPT

## 2018-08-12 PROCEDURE — 63600175 PHARM REV CODE 636 W HCPCS: Performed by: STUDENT IN AN ORGANIZED HEALTH CARE EDUCATION/TRAINING PROGRAM

## 2018-08-12 PROCEDURE — 94664 DEMO&/EVAL PT USE INHALER: CPT

## 2018-08-12 PROCEDURE — 83735 ASSAY OF MAGNESIUM: CPT

## 2018-08-12 PROCEDURE — 99233 SBSQ HOSP IP/OBS HIGH 50: CPT | Mod: 24,,, | Performed by: NURSE PRACTITIONER

## 2018-08-12 RX ORDER — LANOLIN ALCOHOL/MO/W.PET/CERES
400 CREAM (GRAM) TOPICAL 2 TIMES DAILY
Status: COMPLETED | OUTPATIENT
Start: 2018-08-12 | End: 2018-08-12

## 2018-08-12 RX ADMIN — SIMETHICONE CHEW TAB 80 MG 80 MG: 80 TABLET ORAL at 11:08

## 2018-08-12 RX ADMIN — TACROLIMUS 4 MG: 1 CAPSULE ORAL at 05:08

## 2018-08-12 RX ADMIN — TRAMADOL HYDROCHLORIDE 50 MG: 50 TABLET, COATED ORAL at 08:08

## 2018-08-12 RX ADMIN — RAMELTEON 8 MG: 8 TABLET, FILM COATED ORAL at 08:08

## 2018-08-12 RX ADMIN — HEPARIN SODIUM 5000 UNITS: 5000 INJECTION, SOLUTION INTRAVENOUS; SUBCUTANEOUS at 02:08

## 2018-08-12 RX ADMIN — FUROSEMIDE 80 MG: 10 INJECTION, SOLUTION INTRAMUSCULAR; INTRAVENOUS at 05:08

## 2018-08-12 RX ADMIN — SODIUM POLYSTYRENE SULFONATE 30 G: 15 SUSPENSION ORAL; RECTAL at 09:08

## 2018-08-12 RX ADMIN — INSULIN ASPART 6 UNITS: 100 INJECTION, SOLUTION INTRAVENOUS; SUBCUTANEOUS at 05:08

## 2018-08-12 RX ADMIN — ATORVASTATIN CALCIUM 40 MG: 20 TABLET, FILM COATED ORAL at 08:08

## 2018-08-12 RX ADMIN — HEPARIN SODIUM 5000 UNITS: 5000 INJECTION, SOLUTION INTRAVENOUS; SUBCUTANEOUS at 05:08

## 2018-08-12 RX ADMIN — LEVETIRACETAM 500 MG: 500 TABLET ORAL at 08:08

## 2018-08-12 RX ADMIN — MYCOPHENOLATE MOFETIL 1000 MG: 250 CAPSULE ORAL at 08:08

## 2018-08-12 RX ADMIN — MICONAZOLE NITRATE: 20 OINTMENT TOPICAL at 08:08

## 2018-08-12 RX ADMIN — PREDNISONE 10 MG: 10 TABLET ORAL at 08:08

## 2018-08-12 RX ADMIN — MAGNESIUM OXIDE TAB 400 MG (241.3 MG ELEMENTAL MG) 400 MG: 400 (241.3 MG) TAB at 11:08

## 2018-08-12 RX ADMIN — FUROSEMIDE 80 MG: 10 INJECTION, SOLUTION INTRAMUSCULAR; INTRAVENOUS at 08:08

## 2018-08-12 RX ADMIN — PANTOPRAZOLE SODIUM 40 MG: 40 TABLET, DELAYED RELEASE ORAL at 08:08

## 2018-08-12 RX ADMIN — ACYCLOVIR 400 MG: 200 CAPSULE ORAL at 08:08

## 2018-08-12 RX ADMIN — INSULIN ASPART 6 UNITS: 100 INJECTION, SOLUTION INTRAVENOUS; SUBCUTANEOUS at 11:08

## 2018-08-12 RX ADMIN — HEPARIN SODIUM 5000 UNITS: 5000 INJECTION, SOLUTION INTRAVENOUS; SUBCUTANEOUS at 08:08

## 2018-08-12 RX ADMIN — INSULIN ASPART 6 UNITS: 100 INJECTION, SOLUTION INTRAVENOUS; SUBCUTANEOUS at 08:08

## 2018-08-12 RX ADMIN — DOCUSATE SODIUM 100 MG: 100 CAPSULE, LIQUID FILLED ORAL at 08:08

## 2018-08-12 RX ADMIN — MAGNESIUM OXIDE TAB 400 MG (241.3 MG ELEMENTAL MG) 400 MG: 400 (241.3 MG) TAB at 08:08

## 2018-08-12 RX ADMIN — TACROLIMUS 4 MG: 1 CAPSULE ORAL at 08:08

## 2018-08-12 RX ADMIN — ONDANSETRON 4 MG: 2 INJECTION INTRAMUSCULAR; INTRAVENOUS at 05:08

## 2018-08-12 RX ADMIN — FLUCONAZOLE 200 MG: 200 TABLET ORAL at 08:08

## 2018-08-12 NOTE — SUBJECTIVE & OBJECTIVE
Scheduled Meds:   acyclovir  400 mg Oral BID    atorvastatin  40 mg Oral Daily    bisacodyl  10 mg Rectal Daily    docusate sodium  100 mg Oral Daily    epoetin dread  10,000 Units Intravenous Every Tues, Thurs, Sat    ergocalciferol  50,000 Units Oral Q7 Days    fluconazole  200 mg Oral Daily    furosemide  80 mg Intravenous BID    heparin (porcine)  5,000 Units Subcutaneous Q8H    insulin aspart U-100  6 Units Subcutaneous TIDWM    levETIRAcetam  500 mg Oral BID    miconazole nitrate 2%   Topical (Top) BID    mycophenolate  1,000 mg Oral BID    pantoprazole  40 mg Oral BID    predniSONE  10 mg Oral Daily    tacrolimus  4 mg Oral BID    triamcinolone acetonide 0.1%   Topical (Top) BID     Continuous Infusions:  PRN Meds:acetaminophen, dextrose 50%, dextrose 50%, gelatin adsorbable 100cm top sponge, glucagon (human recombinant), glucose, glucose, insulin aspart U-100, ondansetron, oxyCODONE, ramelteon, simethicone, sodium chloride 0.9%, traMADol    Review of Systems   Constitutional: Positive for activity change, appetite change and fatigue. Negative for chills, diaphoresis and fever.   HENT: Negative for congestion and facial swelling.    Eyes: Negative for pain, discharge and visual disturbance.   Respiratory: Negative for cough, chest tightness, shortness of breath and wheezing.    Cardiovascular: Positive for leg swelling. Negative for chest pain and palpitations.   Gastrointestinal: Positive for abdominal distention. Negative for abdominal pain, constipation, diarrhea, nausea and vomiting.   Endocrine: Negative.    Genitourinary: Negative for decreased urine volume (Improving- unable to measure).   Musculoskeletal: Negative for back pain.   Skin: Positive for wound.   Allergic/Immunologic: Positive for immunocompromised state.   Neurological: Positive for weakness. Negative for dizziness, tremors and headaches.   Hematological: Negative.    Psychiatric/Behavioral: Negative for agitation,  dysphoric mood and sleep disturbance. The patient is not nervous/anxious.      Objective:     Vital Signs (Most Recent):  Temp: 98.3 °F (36.8 °C) (08/12/18 0800)  Pulse: (!) 55 (08/12/18 0800)  Resp: 16 (08/12/18 0800)  BP: 138/63 (08/12/18 0800)  SpO2: 99 % (08/12/18 0800) Vital Signs (24h Range):  Temp:  [98.2 °F (36.8 °C)-98.5 °F (36.9 °C)] 98.3 °F (36.8 °C)  Pulse:  [55-63] 55  Resp:  [16-18] 16  SpO2:  [95 %-99 %] 99 %  BP: (123-160)/(60-68) 138/63     Weight: 133.3 kg (293 lb 14 oz)  Body mass index is 37.73 kg/m².    Intake/Output - Last 3 Shifts     ** Patient Encounter Information Not Found **          Physical Exam   Constitutional: He is oriented to person, place, and time. He appears well-developed. No distress.   Temporal and distal extremity muscle wasting   HENT:   Head: Normocephalic and atraumatic.   Eyes: EOM are normal. Pupils are equal, round, and reactive to light. No scleral icterus.   Neck: Normal range of motion.   Cardiovascular: Normal rate, regular rhythm and normal heart sounds.   Pulmonary/Chest: Effort normal. No respiratory distress. He has no wheezes.   Coarse BS   Abdominal: Soft. Bowel sounds are normal. He exhibits distension (ascites). There is no tenderness.   Chevron inc cdi no ssi   Musculoskeletal: He exhibits edema (3+ BLE).   Neurological: He is alert and oriented to person, place, and time.   Skin: Skin is warm and dry. Capillary refill takes 2 to 3 seconds. He is not diaphoretic.   Psychiatric: He has a normal mood and affect. His behavior is normal. Judgment and thought content normal.   Nursing note and vitals reviewed.      Laboratory:  Immunosuppressants         Stop Route Frequency     tacrolimus capsule 4 mg      -- Oral 2 times daily     mycophenolate capsule 1,000 mg      -- Oral 2 times daily        CBC:   Recent Labs   Lab  08/12/18   0500   WBC  3.18*   RBC  2.56*   HGB  8.1*   HCT  25.6*   PLT  101*   MCV  100*   MCH  31.6*   MCHC  31.6*     CMP:   Recent Labs    Lab  08/12/18   0500   GLU  98   CALCIUM  8.7   ALBUMIN  2.5*   PROT  4.4*   NA  137   K  5.4*   CO2  23   CL  107   BUN  58*   CREATININE  3.8*   ALKPHOS  59   ALT  15   AST  12   BILITOT  0.5     Labs within the past 24 hours have been reviewed.    Diagnostic Results:  I have personally reviewed all pertinent imaging studies.

## 2018-08-12 NOTE — PLAN OF CARE
Reviewed insulin regimen and CBG.     Glucose at goal on current regimen. No changes today.      Will continue to follow along; please call with any questions.

## 2018-08-12 NOTE — PROGRESS NOTES
[facility]  Liver Transplant  Progress Note    Patient Name: Alon Adamson  MRN: [id number]  Admission Date: 2018  Hospital Length of Stay: 51 days  Code Status: Full Code  Primary Care Provider: Mckinley Vides MD  Post-Operative Day: 20    ORGAN:   LIVER  Disease Etiology: Alcoholic Cirrhosis  Donor Type:    - Brain Death  CDC High Risk:   No  Donor CMV Status:   Donor CMV Status: Negative  Donor HBcAB:   Negative  Donor HCV Status:   Negative  Donor HBV TAMAR: Negative  Donor HCV TAMAR: Negative  Whole or Partial: Whole Liver  Biliary Anastomosis: End to End  Arterial Anatomy: Standard  Subjective:     History of Present Illness:  Mr. Adamson is a 64 yo M with PMH ESLD secondary to ETOH cirrhosis, CKD3, CAD.  Complications of liver disease include hyperbilirubinemia, hypoalbuminemia, severe malnutrition,   hepatic encephalopathy, thrombocytopenia, splenomegaly, ascites, hypervolemia, coagulopathy, portal HTN.  Pt admitted  for acutely worsening hepatic encephalopathy and concern for BLE cellulitis as well as severe volume overload.  Complications of ELSD managed while inpt and he received liver offer on 18.  Pt underwent liver transplant without complication: steroid induction, DBD, CMV -/-.  Donor with E coli bacteremia resistant to cipro placed on Rocephin x 2 weeks per ID recs.  Also on fluconazole x 30 days as pt very ill prior to transplant. Post transplant, with nice trend down of AST/ALT and LFTs.  Liver U/S with elevated RIs and decreased HAV.  During initial post operative period, pt required pressor support.  Pt with expected post operative VICKY as with VICKY prior to surgery.  Nephrology consulted and pt placed on intermittent CRRT.  Pressor requirement decreased and pt tolerated CRRT without pressors.  Pt with asymptomatic bradycardia while in ICU prompting cardiology consult. Pt without need for acute intervention but atropine placed at bedside.  HR has improved POD  7 and 8.  He was transferred to the TSU 7/30 PM.          Liver US 7/31 showed small fluid collection likely an adrenal hemorrhage vs hematoma. H&H decreased to 6.6/20.3 8/1- responded to 1 u PRBCs. H/H stable 8/2 AM. Dropped again 8/3 AM- 1 u PRBC given. H&H now stable. Iron studies checked and pt started on epo.     Hospital Course:  Interval history: no acute events overnight. Pt feeling better today. Potassium slightly elevated and pt reports eating bananas daily for a past couple days. Hold Bactrim for now. G6PD ordered in AM. Kayexalate given x 1 dose. Cr continues to improve, 3.8 from 4. Last HD 8/3. Continue Lasix 80 mg IV bid. LFTs stable. Monitor.     Scheduled Meds:   acyclovir  400 mg Oral BID    atorvastatin  40 mg Oral Daily    bisacodyl  10 mg Rectal Daily    docusate sodium  100 mg Oral Daily    epoetin dread  10,000 Units Intravenous Every Tues, Thurs, Sat    ergocalciferol  50,000 Units Oral Q7 Days    fluconazole  200 mg Oral Daily    furosemide  80 mg Intravenous BID    heparin (porcine)  5,000 Units Subcutaneous Q8H    insulin aspart U-100  6 Units Subcutaneous TIDWM    levETIRAcetam  500 mg Oral BID    miconazole nitrate 2%   Topical (Top) BID    mycophenolate  1,000 mg Oral BID    pantoprazole  40 mg Oral BID    predniSONE  10 mg Oral Daily    tacrolimus  4 mg Oral BID    triamcinolone acetonide 0.1%   Topical (Top) BID     Continuous Infusions:  PRN Meds:acetaminophen, dextrose 50%, dextrose 50%, gelatin adsorbable 100cm top sponge, glucagon (human recombinant), glucose, glucose, insulin aspart U-100, ondansetron, oxyCODONE, ramelteon, simethicone, sodium chloride 0.9%, traMADol    Review of Systems   Constitutional: Positive for activity change, appetite change and fatigue. Negative for chills, diaphoresis and fever.   HENT: Negative for congestion and facial swelling.    Eyes: Negative for pain, discharge and visual disturbance.   Respiratory: Negative for cough, chest  tightness, shortness of breath and wheezing.    Cardiovascular: Positive for leg swelling. Negative for chest pain and palpitations.   Gastrointestinal: Positive for abdominal distention. Negative for abdominal pain, constipation, diarrhea, nausea and vomiting.   Endocrine: Negative.    Genitourinary: Negative for decreased urine volume (Improving- unable to measure).   Musculoskeletal: Negative for back pain.   Skin: Positive for wound.   Allergic/Immunologic: Positive for immunocompromised state.   Neurological: Positive for weakness. Negative for dizziness, tremors and headaches.   Hematological: Negative.    Psychiatric/Behavioral: Negative for agitation, dysphoric mood and sleep disturbance. The patient is not nervous/anxious.      Objective:     Vital Signs (Most Recent):  Temp: 98.3 °F (36.8 °C) (08/12/18 0800)  Pulse: (!) 55 (08/12/18 0800)  Resp: 16 (08/12/18 0800)  BP: 138/63 (08/12/18 0800)  SpO2: 99 % (08/12/18 0800) Vital Signs (24h Range):  Temp:  [98.2 °F (36.8 °C)-98.5 °F (36.9 °C)] 98.3 °F (36.8 °C)  Pulse:  [55-63] 55  Resp:  [16-18] 16  SpO2:  [95 %-99 %] 99 %  BP: (123-160)/(60-68) 138/63     Weight: 133.3 kg (293 lb 14 oz)  Body mass index is 37.73 kg/m².    Intake/Output - Last 3 Shifts     ** Patient Encounter Information Not Found **          Physical Exam   Constitutional: He is oriented to person, place, and time. He appears well-developed. No distress.   Temporal and distal extremity muscle wasting   HENT:   Head: Normocephalic and atraumatic.   Eyes: EOM are normal. Pupils are equal, round, and reactive to light. No scleral icterus.   Neck: Normal range of motion.   Cardiovascular: Normal rate, regular rhythm and normal heart sounds.   Pulmonary/Chest: Effort normal. No respiratory distress. He has no wheezes.   Coarse BS   Abdominal: Soft. Bowel sounds are normal. He exhibits distension (ascites). There is no tenderness.   Chevron inc cdi no ssi   Musculoskeletal: He exhibits edema (3+  BLE).   Neurological: He is alert and oriented to person, place, and time.   Skin: Skin is warm and dry. Capillary refill takes 2 to 3 seconds. He is not diaphoretic.   Psychiatric: He has a normal mood and affect. His behavior is normal. Judgment and thought content normal.   Nursing note and vitals reviewed.      Laboratory:  Immunosuppressants         Stop Route Frequency     tacrolimus capsule 4 mg      -- Oral 2 times daily     mycophenolate capsule 1,000 mg      -- Oral 2 times daily        CBC:   Recent Labs   Lab  08/12/18   0500   WBC  3.18*   RBC  2.56*   HGB  8.1*   HCT  25.6*   PLT  101*   MCV  100*   MCH  31.6*   MCHC  31.6*     CMP:   Recent Labs   Lab  08/12/18   0500   GLU  98   CALCIUM  8.7   ALBUMIN  2.5*   PROT  4.4*   NA  137   K  5.4*   CO2  23   CL  107   BUN  58*   CREATININE  3.8*   ALKPHOS  59   ALT  15   AST  12   BILITOT  0.5     Labs within the past 24 hours have been reviewed.    Diagnostic Results:  I have personally reviewed all pertinent imaging studies.    Assessment/Plan:     * Liver transplanted    - LFTs/enzymes normalized.  - POD 1 US with elevated RIs.  - Repeat liver U/S with stable, hyperechoic liver lesion, fluid collection inferior smaller, RIs persistently mildly elevated and mild to mod ascites.  Monitor.          Hyperkalemia    - potassium slightly elevated today, 5.4.  - pt has been bananas daily.   - Kayexalate given x 1.   - Hold Bactrim. G6PD ordered in AM.   - low potassium diet.           Hypervolemia    - cont to have significant scrotal edema and abdominal swelling.  Nephrology following.  Last HD 8/3/18.  Received Lasix 120 mg on 8/9.  Pt unable to use urinal to measure output due to edema.    - Lasix 80 mg bid ordered per Nephrology recs.  - Cr 3.8 from 4. Monitor.           Long-term use of immunosuppressant medication    - see prophylactic immuno.           Prophylactic immunotherapy    - continue prograf, cellcept and prednisone. Monitor labs daily and  adjust dose accordingly for a therapeutic level.           At risk for opportunistic infections    - Opportunistic infection prophylaxis will include Acyclovir for 3 months (CMV D- R-), Bactrim for 6 months, and Fluconazole.          Sinus bradycardia    - Improved.  - Atropine at bedside if HR decreases again.          Steroid-induced hyperglycemia    - Endocrine consulted.          CKD (chronic kidney disease), stage III    - See VICKY.          Stage II pressure ulcer of sacral region    - Wound care following.          Severe protein-calorie malnutrition    - Dietary consulted.  - Tolerating 2-3 supplements daily.  - Pt eating well.        Physical deconditioning    - SLOW to improve with PT/OT.  - Will place SNF consult once medically ready.        Pancytopenia    - Related to decompensated liver disease --> improving now that pt transplanted.        Alteration in skin integrity    - Needs aggressive therapy and OOB.  - wd care changing dressing to legs weekly.            Acute kidney injury superimposed on chronic kidney disease    - With CKD3.  - Expected VICKY post txp.   - UOP unclear as pt unable to measure urine (severe scrotal edema- improving) though feels UOP improving.  - Tolerated HD 8/1 and 8/3.   - Nephrology following and HD per their recs.   - Lasix 120 mg given 8/9/18.    - Cr 3.8 from 4.  - Continue lasix 80 mg bid.         Decompensated hepatic cirrhosis    - Placed status 7 on 6/26 for prescription drug coverage change. Re-activated 7/3. Back on hold d/t frailty and decompensation at the end of last week prompting ICU transfer. Now off hold 7/17.  Plan to submit MELD exception points. Will remeld today at 27.       MELD-Na score: 22 at 8/2/2018  4:30 AM  MELD score: 18 at 8/2/2018  4:30 AM  Calculated from:  Serum Creatinine: 2.8 mg/dL at 8/2/2018  4:30 AM  Serum Sodium: 132 mmol/L at 8/2/2018  4:30 AM  Total Bilirubin: 0.7 mg/dL (Rounded to 1) at 8/2/2018  4:30 AM  INR(ratio): 1.2 at 7/31/2018   5:00 AM  Age: 63 years        Coronary artery disease involving native coronary artery of native heart without angina pectoris    - 2D Echo 7/9 normal EF (55-60%), trivial tricuspid/mitral regurg.  - Will need to assess when to resume home ASA.         Anemia of chronic disease    - Post transplant required blood transfusion 8/1 for H&H 6.6/20.3; 8/2 for H&H 6.4/19.7.  - Epo started on 8/3.  - PRBC 1 unit last transfused 8/5.  - H&H remains stable.        Thrombocytopenia    - Secondary to splenomegaly from portal HTN- improving post transplant.        Hypoalbuminemia due to protein-calorie malnutrition    - appetite fair. He is drinking 2-3 supplements daily.  Pt states he is trying to increase protein in diet.            Seizures    - Continue oral Keppra.  - Keep Mg > 2.  On PO Mag.  - Mag ox 400 mg bid x 2 doses.         Bilateral edema of lower extremity    - Was on intermittent dialysis prior to transplant  - Tolerated HD well 8/1. Last HD 8/3.   - Wrapped by wound care- last changed 8/10.  Significant improvement.        Hepatic encephalopathy    - ESLD secondary to EtOH cirrhosis with severe complications (hyperbilirubinemia, hypoalbuminemia, severe malnutrition, hepatic encephalopathy, thrombocytopenia, splenomegaly, ascites, hypervolemia, coaguloopathy, portal HTN), seizure disorder on Keppra, CKD3, CAD   - PRIOR to transplant- had frequent episodes of lethargy/confusion/slowing when holding lactulose   - admitted to SICU 7/13/18 for worsening encephalopathy and hypothermia  - PO lactulose TID, PRN lactulose enemas, Rifaximin  - Now s/p liver transplant- mental status improving daily.              VTE Risk Mitigation (From admission, onward)        Ordered     heparin (porcine) injection 5,000 Units  Every 8 hours      07/26/18 1330     Place sequential compression device  Until discontinued      07/23/18 1326     IP VTE HIGH RISK PATIENT  Once      07/23/18 1242          The patients clinical status was  discussed at multidisplinary rounds, involving transplant surgery, transplant medicine, pharmacy, nursing, nutrition, and social work    Discharge Planning: not a candidate for dc at this time.       Daphne Day NP  Liver Transplant  [facility]

## 2018-08-12 NOTE — PLAN OF CARE
Problem: Patient Care Overview  Goal: Plan of Care Review  Outcome: Ongoing (interventions implemented as appropriate)  Patient aaox4. Bed kept locked, lowest position with 2x side rails up while in bed. Family at bedside, attentive to patient's needs. Call bell and personal items within reach. q2hr turning.  up to side of bed at lunch. Regular diet with low potassium. Potassium 5.4 this morning, given kayexalate. Poor appetite, eating encouraged. accuchecks ac/hs, insulin given as ordered. Telemetry monitoring, SR. Abdomen distended; patient with complaints of gas, given prn simethicone. Generalized and dependent edema. Right arm midline cdi. Right IJ cdi. Lasix 80mg, BID.  Wife at bedside did self medications.  Wound care to legs on tues./friday. Standard precautions maintained.  Plan for possible discharge to rehad at end of week.

## 2018-08-12 NOTE — ASSESSMENT & PLAN NOTE
- potassium slightly elevated today, 5.4.  - pt has been bananas daily.   - Kayexalate given x 1.   - Hold Bactrim. G6PD ordered in AM.   - low potassium diet.

## 2018-08-12 NOTE — ASSESSMENT & PLAN NOTE
- With CKD3.  - Expected VICKY post txp.   - UOP unclear as pt unable to measure urine (severe scrotal edema- improving) though feels UOP improving.  - Tolerated HD 8/1 and 8/3.   - Nephrology following and HD per their recs.   - Lasix 120 mg given 8/9/18.    - Cr 3.8 from 4.  - Continue lasix 80 mg bid.

## 2018-08-12 NOTE — ASSESSMENT & PLAN NOTE
- cont to have significant scrotal edema and abdominal swelling.  Nephrology following.  Last HD 8/3/18.  Received Lasix 120 mg on 8/9.  Pt unable to use urinal to measure output due to edema.    - Lasix 80 mg bid ordered per Nephrology recs.  - Cr 3.8 from 4. Monitor.

## 2018-08-13 PROBLEM — K72.90 DECOMPENSATED HEPATIC CIRRHOSIS: Status: RESOLVED | Noted: 2018-01-21 | Resolved: 2018-08-13

## 2018-08-13 PROBLEM — K74.60 DECOMPENSATED HEPATIC CIRRHOSIS: Status: RESOLVED | Noted: 2018-01-21 | Resolved: 2018-08-13

## 2018-08-13 LAB
ALBUMIN SERPL BCP-MCNC: 2.5 G/DL
ALP SERPL-CCNC: 55 U/L
ALT SERPL W/O P-5'-P-CCNC: 14 U/L
ANION GAP SERPL CALC-SCNC: 8 MMOL/L
AST SERPL-CCNC: 11 U/L
BASOPHILS # BLD AUTO: 0.02 K/UL
BASOPHILS NFR BLD: 0.7 %
BILIRUB SERPL-MCNC: 0.5 MG/DL
BUN SERPL-MCNC: 58 MG/DL
CALCIUM SERPL-MCNC: 8.4 MG/DL
CHLORIDE SERPL-SCNC: 107 MMOL/L
CO2 SERPL-SCNC: 23 MMOL/L
CREAT SERPL-MCNC: 3.6 MG/DL
DIFFERENTIAL METHOD: ABNORMAL
EOSINOPHIL # BLD AUTO: 0.1 K/UL
EOSINOPHIL NFR BLD: 1.7 %
ERYTHROCYTE [DISTWIDTH] IN BLOOD BY AUTOMATED COUNT: 17.7 %
EST. GFR  (AFRICAN AMERICAN): 19.7 ML/MIN/1.73 M^2
EST. GFR  (NON AFRICAN AMERICAN): 17.1 ML/MIN/1.73 M^2
GLUCOSE SERPL-MCNC: 96 MG/DL
GLUCOSE-6-PHOSPHATE DEHYDROGENASE QUAL: NORMAL
HCT VFR BLD AUTO: 24.7 %
HGB BLD-MCNC: 8.1 G/DL
IMM GRANULOCYTES # BLD AUTO: 0.01 K/UL
IMM GRANULOCYTES NFR BLD AUTO: 0.3 %
LYMPHOCYTES # BLD AUTO: 0.5 K/UL
LYMPHOCYTES NFR BLD: 14.9 %
MAGNESIUM SERPL-MCNC: 1.5 MG/DL
MCH RBC QN AUTO: 32.7 PG
MCHC RBC AUTO-ENTMCNC: 32.8 G/DL
MCV RBC AUTO: 100 FL
MONOCYTES # BLD AUTO: 0.4 K/UL
MONOCYTES NFR BLD: 13.5 %
NEUTROPHILS # BLD AUTO: 2.1 K/UL
NEUTROPHILS NFR BLD: 68.9 %
NRBC BLD-RTO: 0 /100 WBC
PHOSPHATE SERPL-MCNC: 5 MG/DL
PLATELET # BLD AUTO: 96 K/UL
PMV BLD AUTO: 10 FL
POCT GLUCOSE: 110 MG/DL (ref 70–110)
POCT GLUCOSE: 127 MG/DL (ref 70–110)
POCT GLUCOSE: 144 MG/DL (ref 70–110)
POCT GLUCOSE: 91 MG/DL (ref 70–110)
POTASSIUM SERPL-SCNC: 4.7 MMOL/L
PROT SERPL-MCNC: 4.3 G/DL
RBC # BLD AUTO: 2.48 M/UL
SODIUM SERPL-SCNC: 138 MMOL/L
TACROLIMUS BLD-MCNC: 6.6 NG/ML
WBC # BLD AUTO: 3.03 K/UL

## 2018-08-13 PROCEDURE — 27000646 HC AEROBIKA DEVICE

## 2018-08-13 PROCEDURE — 82960 TEST FOR G6PD ENZYME: CPT

## 2018-08-13 PROCEDURE — 99233 SBSQ HOSP IP/OBS HIGH 50: CPT | Mod: 24,,, | Performed by: NURSE PRACTITIONER

## 2018-08-13 PROCEDURE — 99900035 HC TECH TIME PER 15 MIN (STAT)

## 2018-08-13 PROCEDURE — 80197 ASSAY OF TACROLIMUS: CPT

## 2018-08-13 PROCEDURE — 99231 SBSQ HOSP IP/OBS SF/LOW 25: CPT | Mod: GC,,, | Performed by: INTERNAL MEDICINE

## 2018-08-13 PROCEDURE — 83735 ASSAY OF MAGNESIUM: CPT

## 2018-08-13 PROCEDURE — 25000003 PHARM REV CODE 250: Performed by: STUDENT IN AN ORGANIZED HEALTH CARE EDUCATION/TRAINING PROGRAM

## 2018-08-13 PROCEDURE — 97535 SELF CARE MNGMENT TRAINING: CPT

## 2018-08-13 PROCEDURE — 84100 ASSAY OF PHOSPHORUS: CPT

## 2018-08-13 PROCEDURE — 97530 THERAPEUTIC ACTIVITIES: CPT

## 2018-08-13 PROCEDURE — 94761 N-INVAS EAR/PLS OXIMETRY MLT: CPT

## 2018-08-13 PROCEDURE — 25000003 PHARM REV CODE 250: Performed by: PHYSICIAN ASSISTANT

## 2018-08-13 PROCEDURE — 25000003 PHARM REV CODE 250: Performed by: SURGERY

## 2018-08-13 PROCEDURE — 20600001 HC STEP DOWN PRIVATE ROOM

## 2018-08-13 PROCEDURE — 94664 DEMO&/EVAL PT USE INHALER: CPT

## 2018-08-13 PROCEDURE — 63600175 PHARM REV CODE 636 W HCPCS: Performed by: SURGERY

## 2018-08-13 PROCEDURE — 25000003 PHARM REV CODE 250: Performed by: NURSE PRACTITIONER

## 2018-08-13 PROCEDURE — 63600175 PHARM REV CODE 636 W HCPCS: Performed by: STUDENT IN AN ORGANIZED HEALTH CARE EDUCATION/TRAINING PROGRAM

## 2018-08-13 PROCEDURE — 85025 COMPLETE CBC W/AUTO DIFF WBC: CPT

## 2018-08-13 PROCEDURE — 63600175 PHARM REV CODE 636 W HCPCS: Performed by: NURSE PRACTITIONER

## 2018-08-13 PROCEDURE — 80053 COMPREHEN METABOLIC PANEL: CPT

## 2018-08-13 PROCEDURE — 97110 THERAPEUTIC EXERCISES: CPT

## 2018-08-13 RX ORDER — INSULIN ASPART 100 [IU]/ML
5 INJECTION, SOLUTION INTRAVENOUS; SUBCUTANEOUS
Status: DISCONTINUED | OUTPATIENT
Start: 2018-08-13 | End: 2018-08-14

## 2018-08-13 RX ORDER — LANOLIN ALCOHOL/MO/W.PET/CERES
400 CREAM (GRAM) TOPICAL 2 TIMES DAILY
Status: DISCONTINUED | OUTPATIENT
Start: 2018-08-13 | End: 2018-08-14

## 2018-08-13 RX ADMIN — HEPARIN SODIUM 5000 UNITS: 5000 INJECTION, SOLUTION INTRAVENOUS; SUBCUTANEOUS at 05:08

## 2018-08-13 RX ADMIN — ACYCLOVIR 400 MG: 200 CAPSULE ORAL at 08:08

## 2018-08-13 RX ADMIN — RAMELTEON 8 MG: 8 TABLET, FILM COATED ORAL at 11:08

## 2018-08-13 RX ADMIN — TRAMADOL HYDROCHLORIDE 50 MG: 50 TABLET, COATED ORAL at 08:08

## 2018-08-13 RX ADMIN — HEPARIN SODIUM 5000 UNITS: 5000 INJECTION, SOLUTION INTRAVENOUS; SUBCUTANEOUS at 08:08

## 2018-08-13 RX ADMIN — ATORVASTATIN CALCIUM 40 MG: 20 TABLET, FILM COATED ORAL at 08:08

## 2018-08-13 RX ADMIN — INSULIN ASPART 5 UNITS: 100 INJECTION, SOLUTION INTRAVENOUS; SUBCUTANEOUS at 02:08

## 2018-08-13 RX ADMIN — MAGNESIUM OXIDE TAB 400 MG (241.3 MG ELEMENTAL MG) 400 MG: 400 (241.3 MG) TAB at 08:08

## 2018-08-13 RX ADMIN — PANTOPRAZOLE SODIUM 40 MG: 40 TABLET, DELAYED RELEASE ORAL at 08:08

## 2018-08-13 RX ADMIN — FLUCONAZOLE 200 MG: 200 TABLET ORAL at 08:08

## 2018-08-13 RX ADMIN — MYCOPHENOLATE MOFETIL 1000 MG: 250 CAPSULE ORAL at 08:08

## 2018-08-13 RX ADMIN — SIMETHICONE CHEW TAB 80 MG 80 MG: 80 TABLET ORAL at 08:08

## 2018-08-13 RX ADMIN — TRAMADOL HYDROCHLORIDE 50 MG: 50 TABLET, COATED ORAL at 05:08

## 2018-08-13 RX ADMIN — FUROSEMIDE 80 MG: 10 INJECTION, SOLUTION INTRAMUSCULAR; INTRAVENOUS at 06:08

## 2018-08-13 RX ADMIN — TACROLIMUS 4 MG: 1 CAPSULE ORAL at 08:08

## 2018-08-13 RX ADMIN — ONDANSETRON 4 MG: 2 INJECTION INTRAMUSCULAR; INTRAVENOUS at 05:08

## 2018-08-13 RX ADMIN — TACROLIMUS 4 MG: 1 CAPSULE ORAL at 06:08

## 2018-08-13 RX ADMIN — INSULIN ASPART 5 UNITS: 100 INJECTION, SOLUTION INTRAVENOUS; SUBCUTANEOUS at 06:08

## 2018-08-13 RX ADMIN — FUROSEMIDE 80 MG: 10 INJECTION, SOLUTION INTRAMUSCULAR; INTRAVENOUS at 09:08

## 2018-08-13 RX ADMIN — MICONAZOLE NITRATE: 20 OINTMENT TOPICAL at 08:08

## 2018-08-13 RX ADMIN — PANTOPRAZOLE SODIUM 40 MG: 40 TABLET, DELAYED RELEASE ORAL at 09:08

## 2018-08-13 RX ADMIN — SIMETHICONE CHEW TAB 80 MG 80 MG: 80 TABLET ORAL at 05:08

## 2018-08-13 RX ADMIN — TRAMADOL HYDROCHLORIDE 50 MG: 50 TABLET, COATED ORAL at 11:08

## 2018-08-13 RX ADMIN — PREDNISONE 10 MG: 10 TABLET ORAL at 09:08

## 2018-08-13 RX ADMIN — MAGNESIUM OXIDE TAB 400 MG (241.3 MG ELEMENTAL MG) 400 MG: 400 (241.3 MG) TAB at 10:08

## 2018-08-13 RX ADMIN — LEVETIRACETAM 500 MG: 500 TABLET ORAL at 08:08

## 2018-08-13 RX ADMIN — DOCUSATE SODIUM 100 MG: 100 CAPSULE, LIQUID FILLED ORAL at 08:08

## 2018-08-13 RX ADMIN — HEPARIN SODIUM 5000 UNITS: 5000 INJECTION, SOLUTION INTRAVENOUS; SUBCUTANEOUS at 02:08

## 2018-08-13 NOTE — ASSESSMENT & PLAN NOTE
- appetite fair. He is drinking 2-3 supplements daily.  Pt states he is personally trying to increase protein in diet.

## 2018-08-13 NOTE — ASSESSMENT & PLAN NOTE
- With CKD3.  - Expected VICKY post txp.   - UOP unclear as pt unable to measure urine (severe scrotal edema- improving) though feels UOP improving.  - Tolerated HD 8/1 and 8/3.   - Nephrology following and HD per their recs.   - Lasix 120 mg given 8/9/18.    - Cr trended 4 ->3.8-> 3.6.  BUN trended up to 58.  - Continue lasix 80 mg bid.

## 2018-08-13 NOTE — PROGRESS NOTES
LEAH met with pt and pts mother at length today for continuity of care.  Pt presents in bed, alert and oriented x4 with good eye contact, engaging and communicative.   Pt remains weak, needing assistance with all ADL's.  Pt is working with therapy, but voiced concerns to  about needing SNF placement, pt was thinking this would be to a nursing home.  LEAH provided pt and pts mom with education about differences between inpatient rehab, hospital based SNF and nursing home SNF.  SW explained about patient being new transplant recipient that the team would prefer him to progress to hospital based SNF, not NH SNF.    Pt discussed his concerns for getting up to cardiac chair vs staying in bed, but after therapy and bedside nurse explaining purpose of cardiac chair, he is agreeable to sit in the cardi chair tomorrow.   SW encouraged good nutrition as well to the patient.  Pts wife back home working while pt hospitalized.  No other psychosocial needs identified by patient or his mother today.  LEAH remains available for all transplant resources, education and psychosocial support.

## 2018-08-13 NOTE — ASSESSMENT & PLAN NOTE
ATN on CKD    Continues with good urine output, although not recorded    Still edematous    -No need for RRT, can remove trialysis  - Continue lasix at 80mg IV BID  - Strict I/Os, serial RFPs  - Avoid nephrotoxic medications  - Hb > 7gm/dL

## 2018-08-13 NOTE — SUBJECTIVE & OBJECTIVE
Interval History: no acute events overnight. Started 80 BID lasix yesterday. Per patient urinating a lot, unable to record because of inability to use bed pan secondary to scrotal swelling.  Cr improving 3.8-->3.6    Review of patient's allergies indicates:   Allergen Reactions    Nsaids (non-steroidal anti-inflammatory drug)      D/t to liver disease.    Penicillins Other (See Comments)     Tolerated pip-tazo in 8/2016 without issue  Tolerated cefepime 7/2018 without issue  Since childhood was told not to take it     Current Facility-Administered Medications   Medication Frequency    acetaminophen tablet 500 mg Q6H PRN    acyclovir capsule 400 mg BID    atorvastatin tablet 40 mg Daily    bisacodyl suppository 10 mg Daily    dextrose 50% injection 12.5 g PRN    dextrose 50% injection 25 g PRN    docusate sodium capsule 100 mg Daily    epoetin dread injection 10,000 Units Every Tues, Thurs, Sat    ergocalciferol capsule 50,000 Units Q7 Days    fluconazole tablet 200 mg Daily    furosemide injection 80 mg BID    gelatin adsorbable 100cm top sponge 100 sponge 1 applicator PRN    glucagon (human recombinant) injection 1 mg PRN    glucose chewable tablet 16 g PRN    glucose chewable tablet 24 g PRN    heparin (porcine) injection 5,000 Units Q8H    insulin aspart U-100 pen 0-5 Units QID (AC + HS) PRN    insulin aspart U-100 pen 5 Units TIDWM    levETIRAcetam tablet 500 mg BID    magnesium oxide tablet 400 mg BID    miconazole nitrate 2% ointment BID    mycophenolate capsule 1,000 mg BID    ondansetron injection 4 mg Q8H PRN    oxyCODONE immediate release tablet 5 mg Q4H PRN    pantoprazole EC tablet 40 mg BID    predniSONE tablet 10 mg Daily    ramelteon tablet 8 mg Nightly PRN    simethicone chewable tablet 80 mg TID PRN    sodium chloride 0.9% flush 3 mL PRN    tacrolimus capsule 4 mg BID    traMADol tablet 50 mg Q6H PRN       Objective:     Vital Signs (Most Recent):  Temp: 98 °F (36.7  °C) (08/13/18 1157)  Pulse: 89 (08/13/18 1157)  Resp: 16 (08/13/18 1157)  BP: (!) 130/59 (08/13/18 1157)  SpO2: 95 % (08/13/18 1157)  O2 Device (Oxygen Therapy): room air (08/13/18 1157) Vital Signs (24h Range):  Temp:  [98 °F (36.7 °C)-98.3 °F (36.8 °C)] 98 °F (36.7 °C)  Pulse:  [60-89] 89  Resp:  [16-20] 16  SpO2:  [94 %-98 %] 95 %  BP: (121-153)/(57-63) 130/59     Weight: 132.8 kg (292 lb 12.3 oz) (08/13/18 0400)  Body mass index is 37.59 kg/m².  Body surface area is 2.63 meters squared.    I/O last 3 completed shifts:  In: 1440 [P.O.:1440]  Out: -     Physical Exam   Constitutional: He appears well-developed and well-nourished. No distress.   HENT:   Head: Normocephalic and atraumatic.   Eyes: Conjunctivae are normal. No scleral icterus.   Neck: Normal range of motion. Neck supple.   Cardiovascular: Normal rate, regular rhythm and normal heart sounds. Exam reveals no gallop and no friction rub.   No murmur heard.  Pulmonary/Chest: Effort normal and breath sounds normal. No respiratory distress. He has no rales.   Abdominal: Soft. Bowel sounds are normal. He exhibits no distension. There is no tenderness.   Musculoskeletal: Normal range of motion. He exhibits edema. He exhibits no tenderness.   Skin: Skin is warm and dry. He is not diaphoretic. No erythema. No pallor.       Significant Labs:  CBC:   Recent Labs   Lab  08/13/18   0500   WBC  3.03*   RBC  2.48*   HGB  8.1*   HCT  24.7*   PLT  96*   MCV  100*   MCH  32.7*   MCHC  32.8     CMP:   Recent Labs   Lab  08/13/18   0500   GLU  96   CALCIUM  8.4*   ALBUMIN  2.5*   PROT  4.3*   NA  138   K  4.7   CO2  23   CL  107   BUN  58*   CREATININE  3.6*   ALKPHOS  55   ALT  14   AST  11   BILITOT  0.5     No results for input(s): COLORU, CLARITYU, SPECGRAV, PHUR, PROTEINUA, GLUCOSEU, BILIRUBINCON, BLOODU, WBCU, RBCU, BACTERIA, MUCUS, NITRITE, LEUKOCYTESUR, UROBILINOGEN, HYALINECASTS in the last 168 hours.

## 2018-08-13 NOTE — CONSULTS
Facts (Spiritual Perception of Illness): patient has recently had transplant and is adjusting     Feelings (Emotions/Experiences/Coping):  Patient has feelings of sadness and emotional distress, depression from being sick so long and patient thinks there is a lack of improvement after transplant. Patient has concerns about going to nursing home and not getting better.  He also has concerns about rehab but thinks it will be good because they will work him and get him stronger        Family/Friends:  Patient has supportive fmly including son, wife. Mother is present. Patient is motivated to get better for his family to be present for them and to get home.       Mali (Belief/Meaning/Philosophy): Patient has recently started going to a [employer] close to his house.   has recently moved and he misses that .  Patient remembers some scriptures and those that he recalls help him.      Future (Spiritual Care Plan of Action): Patient could use continual visits for encouragement and spiritual support and to support use of positive coping skills.  Provided several coping skills for patient to use which patient found helpful.

## 2018-08-13 NOTE — PLAN OF CARE
Problem: Patient Care Overview  Goal: Plan of Care Review  Outcome: Ongoing (interventions implemented as appropriate)   spent time with pt this am. Pt stated visit was helpful.  PT/OT worked with pt. Pt able to stand with assistance of therapy and walker. Tolerated well. Tramadol given. Warm pack placed to R shoulder after.  Pain relieved.  Coflex wrap remains intact to BLE. Afebrile. Immunosuppression continues. Liver tests stable. Cr=3.6 decreased from 3.8. No HD ordered. Edema remains. Lasix IVP continues. Mother prepared self-meds correctly and is painting wound correctly. Moisture associated dermatitis greatly improved to Bilat groins and buttocks. Miconazole ointment applied this am. Encouraged pt to eat and drink Boost. Drank Boost late am today. Continuing to monitor.

## 2018-08-13 NOTE — PROGRESS NOTES
[facility]  Nephrology  Progress Note    Patient Name: Alon Adamson  MRN: [id number]  Admission Date: 6/22/2018  Hospital Length of Stay: 52 days  Attending Provider: Elan Guerra MD   Primary Care Physician: Mckinley Vides MD  Principal Problem:Liver transplanted    Subjective:     HPI: Mr. Adamson is 63 yr old male with decompensated alcoholic cirrhosis, CKD III and CAD admitted here on 06/22/18 admitted for bilateral LE hypervolemia and VICKY on CKD III with cr of 2.3 baseline around 1.5. Patient has multiple hospitalization in the past secondary to decompensated liver failure. Patient is currently on transplant team. During current admission patient was getting lasix and albumin intermittently for VICKY however renal function was not getting better. Patient hospital course complicated by hepatic encephalopathy with some improvement of mentation with lactulose. Also treated for cellulitis with 7 days of vancomycin.Nephrology is consulted for worsening/not improving VICKY despite lasix/albumin.     Per chart review patient has no hypotensive episodes, BP mostly above 100's. Has not received nephrotoxins such as NSAIDs/contrast media. No sever sepsis/septic shock or acute blood loss during current admit. UA with 2+ leukocytes and Hyaline casts, no wbc/rbc cast or proteinuria. Urine Na+ < 20.    Patient was transferred to ICU on 7/13/2018 after being more lethargic and hypoactive.  After two days was stepped down back to [facility].       Interval History: no acute events overnight. Started 80 BID lasix yesterday. Per patient urinating a lot, unable to record because of inability to use bed pan secondary to scrotal swelling.  Cr improving 3.8-->3.6    Review of patient's allergies indicates:   Allergen Reactions    Nsaids (non-steroidal anti-inflammatory drug)      D/t to liver disease.    Penicillins Other (See Comments)     Tolerated pip-tazo in 8/2016 without issue  Tolerated cefepime  7/2018 without issue  Since childhood was told not to take it     Current Facility-Administered Medications   Medication Frequency    acetaminophen tablet 500 mg Q6H PRN    acyclovir capsule 400 mg BID    atorvastatin tablet 40 mg Daily    bisacodyl suppository 10 mg Daily    dextrose 50% injection 12.5 g PRN    dextrose 50% injection 25 g PRN    docusate sodium capsule 100 mg Daily    epoetin dread injection 10,000 Units Every Tues, Thurs, Sat    ergocalciferol capsule 50,000 Units Q7 Days    fluconazole tablet 200 mg Daily    furosemide injection 80 mg BID    gelatin adsorbable 100cm top sponge 100 sponge 1 applicator PRN    glucagon (human recombinant) injection 1 mg PRN    glucose chewable tablet 16 g PRN    glucose chewable tablet 24 g PRN    heparin (porcine) injection 5,000 Units Q8H    insulin aspart U-100 pen 0-5 Units QID (AC + HS) PRN    insulin aspart U-100 pen 5 Units TIDWM    levETIRAcetam tablet 500 mg BID    magnesium oxide tablet 400 mg BID    miconazole nitrate 2% ointment BID    mycophenolate capsule 1,000 mg BID    ondansetron injection 4 mg Q8H PRN    oxyCODONE immediate release tablet 5 mg Q4H PRN    pantoprazole EC tablet 40 mg BID    predniSONE tablet 10 mg Daily    ramelteon tablet 8 mg Nightly PRN    simethicone chewable tablet 80 mg TID PRN    sodium chloride 0.9% flush 3 mL PRN    tacrolimus capsule 4 mg BID    traMADol tablet 50 mg Q6H PRN       Objective:     Vital Signs (Most Recent):  Temp: 98 °F (36.7 °C) (08/13/18 1157)  Pulse: 89 (08/13/18 1157)  Resp: 16 (08/13/18 1157)  BP: (!) 130/59 (08/13/18 1157)  SpO2: 95 % (08/13/18 1157)  O2 Device (Oxygen Therapy): room air (08/13/18 1157) Vital Signs (24h Range):  Temp:  [98 °F (36.7 °C)-98.3 °F (36.8 °C)] 98 °F (36.7 °C)  Pulse:  [60-89] 89  Resp:  [16-20] 16  SpO2:  [94 %-98 %] 95 %  BP: (121-153)/(57-63) 130/59     Weight: 132.8 kg (292 lb 12.3 oz) (08/13/18 0400)  Body mass index is 37.59 kg/m².  Body  surface area is 2.63 meters squared.    I/O last 3 completed shifts:  In: 1440 [P.O.:1440]  Out: -     Physical Exam   Constitutional: He appears well-developed and well-nourished. No distress.   HENT:   Head: Normocephalic and atraumatic.   Eyes: Conjunctivae are normal. No scleral icterus.   Neck: Normal range of motion. Neck supple.   Cardiovascular: Normal rate, regular rhythm and normal heart sounds. Exam reveals no gallop and no friction rub.   No murmur heard.  Pulmonary/Chest: Effort normal and breath sounds normal. No respiratory distress. He has no rales.   Abdominal: Soft. Bowel sounds are normal. He exhibits no distension. There is no tenderness.   Musculoskeletal: Normal range of motion. He exhibits edema. He exhibits no tenderness.   Skin: Skin is warm and dry. He is not diaphoretic. No erythema. No pallor.       Significant Labs:  CBC:   Recent Labs   Lab  08/13/18   0500   WBC  3.03*   RBC  2.48*   HGB  8.1*   HCT  24.7*   PLT  96*   MCV  100*   MCH  32.7*   MCHC  32.8     CMP:   Recent Labs   Lab  08/13/18   0500   GLU  96   CALCIUM  8.4*   ALBUMIN  2.5*   PROT  4.3*   NA  138   K  4.7   CO2  23   CL  107   BUN  58*   CREATININE  3.6*   ALKPHOS  55   ALT  14   AST  11   BILITOT  0.5     No results for input(s): COLORU, CLARITYU, SPECGRAV, PHUR, PROTEINUA, GLUCOSEU, BILIRUBINCON, BLOODU, WBCU, RBCU, BACTERIA, MUCUS, NITRITE, LEUKOCYTESUR, UROBILINOGEN, HYALINECASTS in the last 168 hours.         Assessment/Plan:     Acute kidney injury superimposed on chronic kidney disease    ATN on CKD    Continues with good urine output, although not recorded    Still edematous    -No need for RRT, can remove trialysis  - Continue lasix at 80mg IV BID  - Strict I/Os, serial RFPs  - Avoid nephrotoxic medications  - Hb > 7gm/dL            Thank you for your consult. I will follow-up with patient. Please contact us if you have any additional questions.    Otoniel Velarde MD  Nephrology  [facility]    ATTENDING PHYSICIAN ATTESTATION  I have personally interviewed and examined the patient. I thoroughly reviewed the demographic, clinical, laboratorial and imaging information available in medical records. I agree with the assessment and recommendations provided by the subspecialty resident. Dr. Velarde was under my supervision.

## 2018-08-13 NOTE — PLAN OF CARE
Reviewed current BG levels and insulin regimen.     Goal B-180 mg/dl  Currently on Aspart 6 units with meals.   BG levels slightly below goal    Would decrease insulin Aspart to 5 units with meals  POC AC/HS  Low dose correction

## 2018-08-13 NOTE — ASSESSMENT & PLAN NOTE
- Was on intermittent dialysis prior to transplant  - Tolerated HD well 8/1. Last HD 8/3.   - BLE wrapped by wound care- last changed 8/10.  Significant improvement.  Scheduled to be redressed tomorrow.

## 2018-08-13 NOTE — SUBJECTIVE & OBJECTIVE
Scheduled Meds:   acyclovir  400 mg Oral BID    atorvastatin  40 mg Oral Daily    bisacodyl  10 mg Rectal Daily    docusate sodium  100 mg Oral Daily    epoetin dread  10,000 Units Intravenous Every Tues, Thurs, Sat    ergocalciferol  50,000 Units Oral Q7 Days    fluconazole  200 mg Oral Daily    furosemide  80 mg Intravenous BID    heparin (porcine)  5,000 Units Subcutaneous Q8H    insulin aspart U-100  5 Units Subcutaneous TIDWM    levETIRAcetam  500 mg Oral BID    magnesium oxide  400 mg Oral BID    miconazole nitrate 2%   Topical (Top) BID    mycophenolate  1,000 mg Oral BID    pantoprazole  40 mg Oral BID    predniSONE  10 mg Oral Daily    tacrolimus  4 mg Oral BID     Continuous Infusions:  PRN Meds:acetaminophen, dextrose 50%, dextrose 50%, gelatin adsorbable 100cm top sponge, glucagon (human recombinant), glucose, glucose, insulin aspart U-100, ondansetron, oxyCODONE, ramelteon, simethicone, sodium chloride 0.9%, traMADol    Review of Systems   Constitutional: Positive for activity change, appetite change and fatigue. Negative for chills, diaphoresis and fever.   HENT: Negative for congestion and facial swelling.    Eyes: Negative for pain, discharge and visual disturbance.   Respiratory: Negative for cough, chest tightness, shortness of breath and wheezing.    Cardiovascular: Positive for leg swelling. Negative for chest pain and palpitations.   Gastrointestinal: Positive for abdominal distention. Negative for abdominal pain, constipation, diarrhea, nausea and vomiting.   Endocrine: Negative.    Genitourinary: Negative for decreased urine volume (Improving- unable to measure).   Musculoskeletal: Negative for back pain.   Skin: Positive for wound.   Allergic/Immunologic: Positive for immunocompromised state.   Neurological: Positive for weakness. Negative for dizziness, tremors and headaches.   Hematological: Negative.    Psychiatric/Behavioral: Negative for agitation, dysphoric mood and  sleep disturbance. The patient is not nervous/anxious.      Objective:     Vital Signs (Most Recent):  Temp: 98 °F (36.7 °C) (08/13/18 1157)  Pulse: 79 (08/13/18 1501)  Resp: 16 (08/13/18 1157)  BP: (!) 130/59 (08/13/18 1157)  SpO2: 95 % (08/13/18 1157) Vital Signs (24h Range):  Temp:  [98 °F (36.7 °C)-98.3 °F (36.8 °C)] 98 °F (36.7 °C)  Pulse:  [60-89] 79  Resp:  [16-20] 16  SpO2:  [94 %-98 %] 95 %  BP: (121-153)/(57-63) 130/59     Weight: 132.8 kg (292 lb 12.3 oz)  Body mass index is 37.59 kg/m².    Intake/Output - Last 3 Shifts     ** Patient Encounter Information Not Found **          Physical Exam   Constitutional: He is oriented to person, place, and time. He appears well-developed. No distress.   Temporal and distal extremity muscle wasting   HENT:   Head: Normocephalic and atraumatic.   Eyes: EOM are normal. Pupils are equal, round, and reactive to light. No scleral icterus.   Neck: Normal range of motion.   Cardiovascular: Normal rate, regular rhythm and normal heart sounds.   Pulmonary/Chest: Effort normal. No respiratory distress. He has no wheezes.   Coarse BS   Abdominal: Soft. Bowel sounds are normal. He exhibits distension (ascites). There is no tenderness.   Chevron inc cdi no ssi   Musculoskeletal: He exhibits edema (2+ BLE ).   Neurological: He is alert and oriented to person, place, and time.   Skin: Skin is warm and dry. Capillary refill takes 2 to 3 seconds. He is not diaphoretic.   Psychiatric: He has a normal mood and affect. His behavior is normal. Judgment and thought content normal.   Nursing note and vitals reviewed.      Laboratory:  Immunosuppressants         Stop Route Frequency     tacrolimus capsule 4 mg      -- Oral 2 times daily     mycophenolate capsule 1,000 mg      -- Oral 2 times daily        CBC:   Recent Labs   Lab  08/13/18   0500   WBC  3.03*   RBC  2.48*   HGB  8.1*   HCT  24.7*   PLT  96*   MCV  100*   MCH  32.7*   MCHC  32.8     CMP:   Recent Labs   Lab  08/13/18   2407    GLU  96   CALCIUM  8.4*   ALBUMIN  2.5*   PROT  4.3*   NA  138   K  4.7   CO2  23   CL  107   BUN  58*   CREATININE  3.6*   ALKPHOS  55   ALT  14   AST  11   BILITOT  0.5     Tacrolimus Lvl   Date Value Ref Range Status   08/13/2018 6.6 5.0 - 15.0 ng/mL Final     Comment:     Testing performed by Liquid Chromatography-Tandem  Mass Spectrometry (LC-MS/MS).  This test was developed and its performance characteristics  determined by [facility], Department of Pathology  and Laboratory Medicine in a manner consistent with CLIA  requirements. It has not been cleared or approved by the [place]  Food and Drug Administration.  This test is used for clinical   purposes.  It should not be regarded as investigational or for  research.     08/12/2018 7.3 5.0 - 15.0 ng/mL Final     Comment:     Testing performed by Liquid Chromatography-Tandem  Mass Spectrometry (LC-MS/MS).  This test was developed and its performance characteristics  determined by [facility], Department of Pathology  and Laboratory Medicine in a manner consistent with CLIA  requirements. It has not been cleared or approved by the [place]  Food and Drug Administration.  This test is used for clinical   purposes.  It should not be regarded as investigational or for  research.     08/11/2018 7.0 5.0 - 15.0 ng/mL Final     Comment:     Testing performed by Liquid Chromatography-Tandem  Mass Spectrometry (LC-MS/MS).  This test was developed and its performance characteristics  determined by [facility], Department of Pathology  and Laboratory Medicine in a manner consistent with CLIA  requirements. It has not been cleared or approved by the [place]  Food and Drug Administration.  This test is used for clinical   purposes.  It should not be regarded as investigational or for  research.     08/10/2018 6.6 5.0 - 15.0 ng/mL Final     Comment:     Testing performed by Liquid Chromatography-Tandem  Mass Spectrometry (LC-MS/MS).  This test was  developed and its performance characteristics  determined by [facility], Department of Pathology  and Laboratory Medicine in a manner consistent with CLIA  requirements. It has not been cleared or approved by the [place]  Food and Drug Administration.  This test is used for clinical   purposes.  It should not be regarded as investigational or for  research.         Labs within the past 24 hours have been reviewed.    Diagnostic Results:  I have personally reviewed all pertinent imaging studies.

## 2018-08-13 NOTE — PLAN OF CARE
Problem: Physical Therapy Goal  Goal: Physical Therapy Goal  Goals to be met by: 2018    Patient will increase functional independence with mobility by performin. Supine to sit with moderate assistance -  Met  Updated: Supine to sit with Supervision  2. Sit to stand with moderate assistance - met (2018)  Updated: Sit to stand with minimum assistance using rolling walker  3. Gait x 50ft with minimal assistance with RW. - not met  4. Pt will be able to perform hip flexion, hip abduction, and heel raises while standing with use of rolling walker (denoting improvement in balance)- Min A. Not met                 Outcome: Ongoing (interventions implemented as appropriate)  Goals reviewed and remain appropriate. Pt progressing towards goals.    Lucille Craig, PT, DPT   2018  [phone]

## 2018-08-13 NOTE — PT/OT/SLP PROGRESS
Occupational Therapy   Treatment    Name: Alon Adamson  MRN: [id number]  Admitting Diagnosis:  Liver transplanted  21 Days Post-Op    Recommendations:     Discharge Recommendations: nursing facility, skilled  Discharge Equipment Recommendations:  walker, rolling  Barriers to discharge:  Decreased caregiver support, Inaccessible home environment    Subjective     Communicated with: nsg and NP prior to session. Cc with PT  Pain/Comfort:  · Pain Rating 1: 5/10(R shoulder c/o basic soreness during tx)  · Pain Addressed 1: Reposition, Distraction  · Pain Rating Post-Intervention 1: 5/10    Patients cultural, spiritual, Spiritism conflicts given the current situation: none stated     Objective:     Patient found with: telemetry, central line    General Precautions: Standard, fall   Orthopedic Precautions:N/A   Braces:       Occupational Performance:    Bed Mobility:    · Sup >< sit with SBA   · Rolling with min assist for fully rolling to side    Functional Mobility/Transfers:  · Sit to stand x 1 rep with failed attempt with bed slightly elevated; sit to stand with mod assist x2 with increased HOB x 2 more attempts: on first attempt pt stood x ~15-30 sec then noted with total buckling of RLE and total assist to control lowering to bed; second attempt stood x ~2 min and practiced wt. Shifts with PT blocking RLE, took lateral steps x 4 along EOB with CGA x 2 for safety    Activities of Daily Living:  · toileting hygiene with total assist in bed  · Socks with total assist sitting EOB  · Mod assist with gown     Patient left supine with call button in reach    [facility] 6 Click:  [facility] Total Score:      Treatment & Education:  Performed above activity, spent extensive time educating pt on POC and safe progression of activity to w/c and need to con't increasing strength and safety with sit to stand and taking steps for t/f's to/from wheelchair safely. Educated on BUE AROM exercises, issues ball for hand squeezes and hand  strength, educated on progression of strengthening exercises with theraband and theraputty; pt perseverates on same topics throughout tx and is not easy to redirect to stay on task and con't progression and active therapy during sessions, often needs max redirections and cues to focus on task and progress. Significant time spent educating pt on safety, role/goals of therapy with progression of safe activity.   Education:    Assessment:     Alon Adamson is a 63 y.o. male with a medical diagnosis of Liver transplanted.  He presents with slowly improving mobility but also still with buckling noted with standing this date and total assist to control lowering to bed.  Performance deficits affecting function are weakness, impaired endurance, impaired self care skills, impaired functional mobilty, impaired balance, gait instability, decreased upper extremity function, decreased lower extremity function, edema, impaired cardiopulmonary response to activity.      Rehab Prognosis:  good; patient would benefit from acute skilled OT services to address these deficits and reach maximum level of function.       Plan:     Patient to be seen 4 x/week to address the above listed problems via self-care/home management, therapeutic activities, therapeutic exercises, neuromuscular re-education  · Plan of Care Expires: 08/26/18  · Plan of Care Reviewed with: patient    This Plan of care has been discussed with the patient who was involved in its development and understands and is in agreement with the identified goals and treatment plan    GOALS:   Multidisciplinary Problems     Occupational Therapy Goals        Problem: Occupational Therapy Goal    Goal Priority Disciplines Outcome Interventions   Occupational Therapy Goal     OT, PT/OT Ongoing (interventions implemented as appropriate)    Description:  Goals to be met by: 8/27/18    Patient will increase functional independence with ADLs by performing:     Upper body dressing  while seated EOB with SBA  Grooming while standing at sink with Stand-by Assistance  Toileting from bedside commode with Minimal Assistance for hygiene and clothing management.  Toilet transfer to bedside commode with Contact Guard Assistance.  Pt will complete a functional static standing activity x ~4 minutes with CGA.   LB Dressing with Moderate Assistance                             Time Tracking:     OT Date of Treatment: 08/13/18  OT Start Time: 1101  OT Stop Time: 1150  OT Total Time (min): 49 min    Billable Minutes:Self Care/Home Management 15 min  Therapeutic Exercise 34 min    Anni Roman, OT  8/13/2018

## 2018-08-13 NOTE — PLAN OF CARE
Problem: Occupational Therapy Goal  Goal: Occupational Therapy Goal  Goals to be met by: 8/27/18    Patient will increase functional independence with ADLs by performing:     Upper body dressing while seated EOB with SBA  Grooming while standing at sink with Stand-by Assistance  Toileting from bedside commode with Minimal Assistance for hygiene and clothing management.  Toilet transfer to bedside commode with Contact Guard Assistance.  Pt will complete a functional static standing activity x ~4 minutes with CGA.   LB Dressing with Moderate Assistance           Outcome: Ongoing (interventions implemented as appropriate)  Goals extended to 8/27/18  Anni Roman, MARAR

## 2018-08-13 NOTE — ASSESSMENT & PLAN NOTE
- cont to have significant scrotal edema and abdominal swelling.  Nephrology following.  Last HD 8/3/18.  Received Lasix 120 mg on 8/9.  Pt unable to use urinal to measure output due to edema.    - Cont Lasix 80 mg bid ordered per Nephrology recs.  - Cr 3.6 from 3.8. Monitor.

## 2018-08-13 NOTE — PT/OT/SLP PROGRESS
Physical Therapy Treatment    Patient Name:  Alon Adamson   MRN:  [id number]    Recommendations:     Discharge Recommendations:  nursing facility, skilled   Discharge Equipment Recommendations: (TBD)   Barriers to discharge: Decreased caregiver support    Assessment:     Alon Adamson is a 63 y.o. male admitted with a medical diagnosis of Liver transplanted.  He presents with the following impairments/functional limitations:  weakness, gait instability, decreased upper extremity function, impaired endurance, impaired balance, decreased lower extremity function, decreased ROM, decreased safety awareness, impaired self care skills, impaired functional mobilty, edema, impaired skin, decreased coordination; s/p liver transplant 7/23. Pt able to complete transfers supine<>sit without physical assist. However, pt requires assist of 2 and surface height greatly elevated in order to successfully transfer sit>stand. Pt demo'ing episode of knee buckling when standing this date 2* LE weakness. Continues to be limited with mobility 2* impaired endurance, decreased balance, and weakness. Pt would continue to benefit from skilled acute PT in order to address current deficits and progress functional mobility.     Rehab Prognosis:  good; patient would benefit from acute skilled PT services to address these deficits and reach maximum level of function.      Recent Surgery: Procedure(s) (LRB):  TRANSPLANT, LIVER (N/A) 21 Days Post-Op    Plan:     During this hospitalization, patient to be seen 4 x/week to address the above listed problems via gait training, therapeutic activities, therapeutic exercises, neuromuscular re-education  · Plan of Care Expires:  08/26/18   Plan of Care Reviewed with: patient, mother    Subjective     Communicated with RN prior to session.  Patient found supine upon PT entry to room, agreeable to treatment.      Chief Complaint: R shoulder pain   Patient comments/goals: Pt reports that his goal  is to be able to get into wheelchair and propel himself down hallway.    Pain/Comfort:  · Pain Rating 1: 5/10  · Location - Side 1: Right  · Location 1: shoulder  · Pain Addressed 1: Reposition, Distraction, Nurse notified(medication provided during treatment session)    Patients cultural, spiritual, Anabaptism conflicts given the current situation: none noted     Objective:     Patient found with: (no lines connected during session)     General Precautions: Standard, fall   Orthopedic Precautions:N/A   Braces: N/A     Functional Mobility:  · Bed Mobility:     · Rolling Left:  minimum assistance and with side rail  · Rolling Right: minimum assistance and with side rail  · Supine to Sit: stand by assistance  · Sit to Supine: stand by assistance  · Transfers:     · Sit to Stand:  moderate assistance and of 2 persons with rolling walker and with height of bed greatly elevated, x2 successful attempts  · 1 initial failed attempt with height of bed elevated, requiring bed to be elevated higher to successfully complete  · Gait: ~3 lateral steps along EOB with RW and CGA (2nd person for safety)  · Decreased step length, decreased toe-floor clearance, impaired weight-shifting ability       AM-PAC 6 CLICK MOBILITY  Turning over in bed (including adjusting bedclothes, sheets and blankets)?: 3  Sitting down on and standing up from a chair with arms (e.g., wheelchair, bedside commode, etc.): 2  Moving from lying on back to sitting on the side of the bed?: 3  Moving to and from a bed to a chair (including a wheelchair)?: 2  Need to walk in hospital room?: 1  Climbing 3-5 steps with a railing?: 1  Basic Mobility Total Score: 12       Therapeutic Activities and Exercises:   Pt found on bed pan. Performed rolling with Giana to complete susanna-care.   Performed sit<>stand x2 successful reps with modAx2 and RW.    Performed static standing with CGA (x2 people for safety). Cues provided for postural control and correction of standing  position within RW. Pt able to take small steps to adjust foot placement, but then with episode of B knee buckling (R>L), requiring totalA to control lowering to sit EOB.   Performed static standing x2nd trial with CGA (x2 people for safety). Provided postural cues for increased hip ext, scapular retraction and depression, and forward gaze. Performed medial-lateral weight-shifts x5-10 reps B directions; t/c for R quad activation in order to prevent knee buckling; increased LE fatigue noted with activity.  Performed ~3 lateral steps along EOB with CGA (2nd person for safety).   Constructed theraball (wash cloth + Coban) to use for  strength exercises.   Increased time required 2* pt demo'ing inattention and tangential conversations throughout session. Attempted to re-direct pt and provide cues to attend to task, but with difficulty.  Extensive conversation re: safety with mobility and progression of mobility and activities as appropriate.    Patient left supine with all lines intact, call button in reach, RN notified and pt's mother present..    GOALS:   Multidisciplinary Problems     Physical Therapy Goals        Problem: Physical Therapy Goal    Goal Priority Disciplines Outcome Goal Variances Interventions   Physical Therapy Goal     PT, PT/OT Ongoing (interventions implemented as appropriate)     Description:  Goals to be met by: 2018    Patient will increase functional independence with mobility by performin. Supine to sit with moderate assistance -  Met  Updated: Supine to sit with Supervision  2. Sit to stand with moderate assistance - met (2018)  Updated: Sit to stand with minimum assistance using rolling walker  3. Gait x 50ft with minimal assistance with RW. - not met  4. Pt will be able to perform hip flexion, hip abduction, and heel raises while standing with use of rolling walker (denoting improvement in balance)- Min A. Not met                                  Time Tracking:     PT  Received On: 08/13/18  PT Start Time: 1101     PT Stop Time: 1150  PT Total Time (min): 49 min     Billable Minutes: Therapeutic Activity 49   (co-tx with OT)    Treatment Type: Treatment  PT/PTA: PT     PTA Visit Number: 0     Lucille Craig PT, DPT   8/13/2018

## 2018-08-14 PROBLEM — R00.1 BRADYCARDIA: Status: RESOLVED | Noted: 2018-08-11 | Resolved: 2018-08-14

## 2018-08-14 PROBLEM — K74.60 DECOMPENSATED HEPATIC CIRRHOSIS: Status: RESOLVED | Noted: 2018-01-21 | Resolved: 2018-08-14

## 2018-08-14 PROBLEM — E87.5 HYPERKALEMIA: Status: RESOLVED | Noted: 2018-08-12 | Resolved: 2018-08-14

## 2018-08-14 PROBLEM — D70.9 NEUTROPENIA: Status: ACTIVE | Noted: 2018-08-14

## 2018-08-14 PROBLEM — R00.1 SINUS BRADYCARDIA: Status: RESOLVED | Noted: 2018-07-26 | Resolved: 2018-08-14

## 2018-08-14 PROBLEM — K72.90 DECOMPENSATED HEPATIC CIRRHOSIS: Status: RESOLVED | Noted: 2018-01-21 | Resolved: 2018-08-14

## 2018-08-14 PROBLEM — D61.818 PANCYTOPENIA: Status: RESOLVED | Noted: 2018-01-22 | Resolved: 2018-08-14

## 2018-08-14 LAB
ALBUMIN SERPL BCP-MCNC: 2.6 G/DL
ALP SERPL-CCNC: 55 U/L
ALT SERPL W/O P-5'-P-CCNC: 16 U/L
ANION GAP SERPL CALC-SCNC: 9 MMOL/L
AST SERPL-CCNC: 11 U/L
BASOPHILS # BLD AUTO: 0.02 K/UL
BASOPHILS NFR BLD: 0.8 %
BILIRUB SERPL-MCNC: 0.5 MG/DL
BUN SERPL-MCNC: 61 MG/DL
CALCIUM SERPL-MCNC: 8.5 MG/DL
CHLORIDE SERPL-SCNC: 106 MMOL/L
CO2 SERPL-SCNC: 24 MMOL/L
CREAT SERPL-MCNC: 3.4 MG/DL
DIFFERENTIAL METHOD: ABNORMAL
EOSINOPHIL # BLD AUTO: 0.1 K/UL
EOSINOPHIL NFR BLD: 2.6 %
ERYTHROCYTE [DISTWIDTH] IN BLOOD BY AUTOMATED COUNT: 17.7 %
EST. GFR  (AFRICAN AMERICAN): 21.1 ML/MIN/1.73 M^2
EST. GFR  (NON AFRICAN AMERICAN): 18.3 ML/MIN/1.73 M^2
GLUCOSE SERPL-MCNC: 78 MG/DL
HCT VFR BLD AUTO: 25.4 %
HGB BLD-MCNC: 8.2 G/DL
IMM GRANULOCYTES # BLD AUTO: 0.01 K/UL
IMM GRANULOCYTES NFR BLD AUTO: 0.4 %
LYMPHOCYTES # BLD AUTO: 0.4 K/UL
LYMPHOCYTES NFR BLD: 14.7 %
MAGNESIUM SERPL-MCNC: 1.4 MG/DL
MCH RBC QN AUTO: 32.2 PG
MCHC RBC AUTO-ENTMCNC: 32.3 G/DL
MCV RBC AUTO: 100 FL
MONOCYTES # BLD AUTO: 0.4 K/UL
MONOCYTES NFR BLD: 14 %
NEUTROPHILS # BLD AUTO: 1.8 K/UL
NEUTROPHILS NFR BLD: 67.5 %
NRBC BLD-RTO: 0 /100 WBC
PHOSPHATE SERPL-MCNC: 5 MG/DL
PLATELET # BLD AUTO: 95 K/UL
PMV BLD AUTO: 10 FL
POCT GLUCOSE: 128 MG/DL (ref 70–110)
POCT GLUCOSE: 151 MG/DL (ref 70–110)
POCT GLUCOSE: 151 MG/DL (ref 70–110)
POCT GLUCOSE: 81 MG/DL (ref 70–110)
POTASSIUM SERPL-SCNC: 4.6 MMOL/L
PROT SERPL-MCNC: 4.5 G/DL
RBC # BLD AUTO: 2.55 M/UL
SODIUM SERPL-SCNC: 139 MMOL/L
TACROLIMUS BLD-MCNC: 7 NG/ML
WBC # BLD AUTO: 2.65 K/UL

## 2018-08-14 PROCEDURE — 63600175 PHARM REV CODE 636 W HCPCS: Performed by: STUDENT IN AN ORGANIZED HEALTH CARE EDUCATION/TRAINING PROGRAM

## 2018-08-14 PROCEDURE — 63600175 PHARM REV CODE 636 W HCPCS: Performed by: NURSE PRACTITIONER

## 2018-08-14 PROCEDURE — 20600001 HC STEP DOWN PRIVATE ROOM

## 2018-08-14 PROCEDURE — 83735 ASSAY OF MAGNESIUM: CPT

## 2018-08-14 PROCEDURE — 94761 N-INVAS EAR/PLS OXIMETRY MLT: CPT

## 2018-08-14 PROCEDURE — 25000003 PHARM REV CODE 250: Performed by: NURSE PRACTITIONER

## 2018-08-14 PROCEDURE — 99233 SBSQ HOSP IP/OBS HIGH 50: CPT | Mod: 24,,, | Performed by: NURSE PRACTITIONER

## 2018-08-14 PROCEDURE — 25000003 PHARM REV CODE 250: Performed by: SURGERY

## 2018-08-14 PROCEDURE — 25000003 PHARM REV CODE 250: Performed by: STUDENT IN AN ORGANIZED HEALTH CARE EDUCATION/TRAINING PROGRAM

## 2018-08-14 PROCEDURE — 63600175 PHARM REV CODE 636 W HCPCS: Performed by: SURGERY

## 2018-08-14 PROCEDURE — 63600175 PHARM REV CODE 636 W HCPCS: Mod: JG | Performed by: PHYSICIAN ASSISTANT

## 2018-08-14 PROCEDURE — 99231 SBSQ HOSP IP/OBS SF/LOW 25: CPT | Mod: GC,,, | Performed by: INTERNAL MEDICINE

## 2018-08-14 PROCEDURE — 97530 THERAPEUTIC ACTIVITIES: CPT

## 2018-08-14 PROCEDURE — 99231 SBSQ HOSP IP/OBS SF/LOW 25: CPT | Mod: ,,, | Performed by: NURSE PRACTITIONER

## 2018-08-14 PROCEDURE — 80197 ASSAY OF TACROLIMUS: CPT

## 2018-08-14 PROCEDURE — 25000003 PHARM REV CODE 250: Performed by: PHYSICIAN ASSISTANT

## 2018-08-14 PROCEDURE — 27000646 HC AEROBIKA DEVICE

## 2018-08-14 PROCEDURE — 99900035 HC TECH TIME PER 15 MIN (STAT)

## 2018-08-14 PROCEDURE — 85025 COMPLETE CBC W/AUTO DIFF WBC: CPT

## 2018-08-14 PROCEDURE — 80053 COMPREHEN METABOLIC PANEL: CPT

## 2018-08-14 PROCEDURE — 94664 DEMO&/EVAL PT USE INHALER: CPT

## 2018-08-14 PROCEDURE — 84100 ASSAY OF PHOSPHORUS: CPT

## 2018-08-14 RX ORDER — INSULIN ASPART 100 [IU]/ML
4 INJECTION, SOLUTION INTRAVENOUS; SUBCUTANEOUS
Status: DISCONTINUED | OUTPATIENT
Start: 2018-08-14 | End: 2018-08-15

## 2018-08-14 RX ORDER — ACETAMINOPHEN 325 MG/1
650 TABLET ORAL EVERY 12 HOURS
Status: DISCONTINUED | OUTPATIENT
Start: 2018-08-14 | End: 2018-08-17 | Stop reason: HOSPADM

## 2018-08-14 RX ORDER — MYCOPHENOLATE MOFETIL 250 MG/1
500 CAPSULE ORAL 2 TIMES DAILY
Status: DISCONTINUED | OUTPATIENT
Start: 2018-08-14 | End: 2018-08-16

## 2018-08-14 RX ORDER — PREDNISONE 5 MG/1
5 TABLET ORAL DAILY
Status: DISCONTINUED | OUTPATIENT
Start: 2018-08-19 | End: 2018-08-17 | Stop reason: HOSPADM

## 2018-08-14 RX ORDER — LANOLIN ALCOHOL/MO/W.PET/CERES
800 CREAM (GRAM) TOPICAL 2 TIMES DAILY
Status: DISCONTINUED | OUTPATIENT
Start: 2018-08-14 | End: 2018-08-16

## 2018-08-14 RX ADMIN — PANTOPRAZOLE SODIUM 40 MG: 40 TABLET, DELAYED RELEASE ORAL at 08:08

## 2018-08-14 RX ADMIN — MICONAZOLE NITRATE: 20 OINTMENT TOPICAL at 08:08

## 2018-08-14 RX ADMIN — TACROLIMUS 4 MG: 1 CAPSULE ORAL at 08:08

## 2018-08-14 RX ADMIN — RAMELTEON 8 MG: 8 TABLET, FILM COATED ORAL at 10:08

## 2018-08-14 RX ADMIN — ERYTHROPOIETIN 10000 UNITS: 10000 INJECTION, SOLUTION INTRAVENOUS; SUBCUTANEOUS at 10:08

## 2018-08-14 RX ADMIN — MYCOPHENOLATE MOFETIL 500 MG: 250 CAPSULE ORAL at 08:08

## 2018-08-14 RX ADMIN — ONDANSETRON 4 MG: 2 INJECTION INTRAMUSCULAR; INTRAVENOUS at 08:08

## 2018-08-14 RX ADMIN — FUROSEMIDE 80 MG: 10 INJECTION, SOLUTION INTRAMUSCULAR; INTRAVENOUS at 05:08

## 2018-08-14 RX ADMIN — LEVETIRACETAM 500 MG: 500 TABLET ORAL at 08:08

## 2018-08-14 RX ADMIN — INSULIN ASPART 4 UNITS: 100 INJECTION, SOLUTION INTRAVENOUS; SUBCUTANEOUS at 11:08

## 2018-08-14 RX ADMIN — PREDNISONE 10 MG: 10 TABLET ORAL at 08:08

## 2018-08-14 RX ADMIN — ACETAMINOPHEN 650 MG: 325 TABLET, FILM COATED ORAL at 10:08

## 2018-08-14 RX ADMIN — MAGNESIUM OXIDE TAB 400 MG (241.3 MG ELEMENTAL MG) 800 MG: 400 (241.3 MG) TAB at 08:08

## 2018-08-14 RX ADMIN — ACYCLOVIR 400 MG: 200 CAPSULE ORAL at 08:08

## 2018-08-14 RX ADMIN — TACROLIMUS 4 MG: 1 CAPSULE ORAL at 05:08

## 2018-08-14 RX ADMIN — MAGNESIUM OXIDE TAB 400 MG (241.3 MG ELEMENTAL MG) 400 MG: 400 (241.3 MG) TAB at 08:08

## 2018-08-14 RX ADMIN — ACETAMINOPHEN 650 MG: 325 TABLET, FILM COATED ORAL at 08:08

## 2018-08-14 RX ADMIN — DOCUSATE SODIUM 100 MG: 100 CAPSULE, LIQUID FILLED ORAL at 08:08

## 2018-08-14 RX ADMIN — INSULIN ASPART 4 UNITS: 100 INJECTION, SOLUTION INTRAVENOUS; SUBCUTANEOUS at 05:08

## 2018-08-14 RX ADMIN — ATORVASTATIN CALCIUM 40 MG: 20 TABLET, FILM COATED ORAL at 08:08

## 2018-08-14 RX ADMIN — HEPARIN SODIUM 5000 UNITS: 5000 INJECTION, SOLUTION INTRAVENOUS; SUBCUTANEOUS at 08:08

## 2018-08-14 RX ADMIN — MYCOPHENOLATE MOFETIL 1000 MG: 250 CAPSULE ORAL at 08:08

## 2018-08-14 RX ADMIN — FLUCONAZOLE 200 MG: 200 TABLET ORAL at 08:08

## 2018-08-14 RX ADMIN — HEPARIN SODIUM 5000 UNITS: 5000 INJECTION, SOLUTION INTRAVENOUS; SUBCUTANEOUS at 04:08

## 2018-08-14 RX ADMIN — HEPARIN SODIUM 5000 UNITS: 5000 INJECTION, SOLUTION INTRAVENOUS; SUBCUTANEOUS at 05:08

## 2018-08-14 RX ADMIN — FUROSEMIDE 80 MG: 10 INJECTION, SOLUTION INTRAMUSCULAR; INTRAVENOUS at 08:08

## 2018-08-14 NOTE — PROGRESS NOTES
[facility]  Adult Nutrition  Progress Note    SUMMARY       Recommendations    Recommendation/Intervention:   1. Continue current diet order, supplements  2. Encourage good po intake   3. Dietitian Following    Goals:   1. >85% EEN & EPN    Nutrition Goal Status: progressing towards goal    Communication of RD Recs: (POC)    Comments: Pt reports altered taste r/t medication, decreasing po intake. Unable to tolerate beneprotein without feeling sick, states it taste horrible, but this is new previously unable to taste it. Drinks >75% of supplement.     Nutrition Discharge Planning: adequate po intake. Post transplant education provided.    Reason for Assessment  Reason for Assessment: RD follow-up  Diagnosis: transplant/postoperative complications  1. Acute kidney injury superimposed on chronic kidney disease    2. Bilateral edema of lower extremity    3. Ascites due to alcoholic cirrhosis    4. Cellulitis of both lower extremities    5. Cholestatic pruritus    6. Hepatic encephalopathy    7. Physical deconditioning    8. Thrombocytopenia    9. Coagulopathy    10. Decompensated hepatic cirrhosis    11. Hypoalbuminemia due to protein-calorie malnutrition    12. Seizures    13. Chest pain    14. Elevated troponin    15. Coronary artery disease involving native coronary artery of native heart without angina pectoris    16. Gram-positive bacteremia    17. Anemia of chronic disease    18. CKD (chronic kidney disease), stage III    19. Dermatitis associated with moisture    20. Pancytopenia    21. Hypervolemia    22. Hypernatremia    23. Severe protein-calorie malnutrition    24. Hypothermia, subsequent encounter    25. Acidosis    26. Nausea and vomiting, intractability of vomiting not specified, unspecified vomiting type    27. Bradycardia    28. Liver transplanted    29. At risk for opportunistic infections    30. Prophylactic immunotherapy    31. Long-term use of immunosuppressant medication    32.  "Hypervolemia, unspecified hypervolemia type    33. Positive blood culture in cadaveric donor    34. Sinus bradycardia    35. Stage II pressure ulcer of sacral region    36. Acute venous stasis dermatitis of both legs    37. Alteration in skin integrity    38. Hyperkalemia    39. Steroid-induced hyperglycemia      Relevant Medical History: ESLD 2/2 alcoholic cirrhosis   Anticoagulant long-term use     Arthritis     Back pain     Cellulitis     Cirrhosis     Coronary artery disease     DM (diabetes mellitus)     Hearing loss     right ear    Hepatic encephalopathy     Hypertension     diagnosed today (11/25/2015) and prescribed toprol    Leg edema     Nephrolithiasis     JACK (obstructive sleep apnea)     Seizures     Splenomegaly      Interdisciplinary Rounds: attended    Nutrition Risk Screen  Nutrition Risk Screen: other (see comments)    Nutrition/Diet History  Patient Reported Diet/Restrictions/Preferences: low salt  Do you have any cultural, spiritual, Islam conflicts, given your current situation?: none noted   Food Allergies: NKFA  Factors Affecting Nutritional Intake: Altered taste, N/V, Early satiety     Anthropometrics  Temp: 97.9 °F (36.6 °C)  Height: 6' 2" (188 cm)  Height (inches): 74 in  Weight Method: Bed Scale  Weight: 127.6 kg (281 lb 4.9 oz)  Weight (lb): 281.31 lb  Ideal Body Weight (IBW), Male: 190 lb  % Ideal Body Weight, Male (lb): 148.98 lb  BMI (Calculated): 36.4  BMI Grade: 35 - 39.9 - obesity - grade II  Usual Body Weight (UBW), kg: (!) 139 kg(admit weight - 6/23)  % Usual Body Weight: 91.05  % Weight Change From Usual Weight: -9.14 %     Lab/Procedures/Meds  Pertinent Labs Reviewed   ALT 16 08/14/2018    AST 11 08/14/2018     08/14/2018    K 4.6 08/14/2018     08/14/2018    CREATININE 3.4 (H) 08/14/2018    BUN 61 (H) 08/14/2018     Pertinent Medications Reviewed  Scheduled Meds:   acetaminophen  650 mg Oral Q12H    acyclovir  400 mg Oral BID    atorvastatin  " 40 mg Oral Daily    bisacodyl  10 mg Rectal Daily    docusate sodium  100 mg Oral Daily    epoetin dread  10,000 Units Intravenous Every Tues, Thurs, Sat    ergocalciferol  50,000 Units Oral Q7 Days    fluconazole  200 mg Oral Daily    furosemide  80 mg Intravenous BID    heparin (porcine)  5,000 Units Subcutaneous Q8H    insulin aspart U-100  4 Units Subcutaneous TIDWM    levETIRAcetam  500 mg Oral BID    magnesium oxide  800 mg Oral BID    miconazole nitrate 2%   Topical (Top) BID    mycophenolate  500 mg Oral BID    pantoprazole  40 mg Oral BID    predniSONE  10 mg Oral Daily    [START ON 8/19/2018] predniSONE  5 mg Oral Daily    tacrolimus  4 mg Oral BID     Physical Findings/Assessment  Overall Physical Appearance: obese  Oral/Mouth Cavity: tooth/teeth missing  Skin: incision(s), pressure ulcer(s)    Estimated/Assessed Needs  Weight Used For Calorie Calculations: 86.4 kg (190 lb 6.2 oz)(IBW)  Energy Calorie Requirements (kcal): 8954-5324(25-30 kcal/kg)  Energy Need Method: [place]  Protein Requirements: (1.0-1.2 gm/kg)  Weight Used For Protein Calculations: 86.4 kg (190 lb 6.2 oz)(IBW)  Fluid Requirements (mL): 2159(1 ml/kcal or per team)  Fluid Need Method: RDA Method  RDA Method (mL): 2159    Nutrition Prescription Ordered  Current Diet Order: regular  Oral Nutrition Supplement: Boost Glucose Control, Beneprotein    Evaluation of Received Nutrient/Fluid Intake  Oral Calories (kcal): 0  Oral Protein (gm): 0  Oral Fluid (mL): 0  I/O: +1L x 24hr , +12.8L since 7/31/18  Energy Calories Required: meeting needs  Protein Required: meeting needs  Fluid Required: (-)  Comments: LBM 8/13  % Intake of Estimated Energy Needs: 50 - 75 %  % Meal Intake: 25 - 50 %    Nutrition Risk  Level of Risk/Frequency of Follow-up: low     Assessment and Plan  Severe protein-calorie malnutrition     Contributing Nutrition Diagnosis  Malnutrition     Related to (etiology):   Chronic Illness/Injury     Signs  and Symptoms (as evidenced by):  Energy Intake: <50% of estimated energy requirement for 1 month  Weight Loss: 9% x 1 month   Fluid Accumulation: moderate     Interventions/Recommendations (treatment strategy):  Full assessment completed, see RD Note 7/31/2018.     Nutrition Diagnosis Status:  Continues     Monitor and Evaluation  Food and Nutrient Intake: food and beverage intake, energy intake  Food and Nutrient Adminstration: diet order  Knowledge/Beliefs/Attitudes: food and nutrition knowledge/skill  Physical Activity and Function: nutrition-related ADLs and IADLs  Anthropometric Measurements: weight, weight change  Biochemical Data, Medical Tests and Procedures: electrolyte and renal panel, gastrointestinal profile, glucose/endocrine profile, inflammatory profile, lipid profile  Nutrition-Focused Physical Findings: overall appearance     Nutrition Follow-Up  RD Follow-up?: Yes

## 2018-08-14 NOTE — PROGRESS NOTES
[facility]  Nephrology  Progress Note    Patient Name: Alon Adamson  MRN: [id number]  Admission Date: 6/22/2018  Hospital Length of Stay: 53 days  Attending Provider: Elan Guerra MD   Primary Care Physician: Mckinley Vides MD  Principal Problem:Liver transplanted    Subjective:     HPI: Mr. Adamson is 63 yr old male with decompensated alcoholic cirrhosis, CKD III and CAD admitted here on 06/22/18 admitted for bilateral LE hypervolemia and VICKY on CKD III with cr of 2.3 baseline around 1.5. Patient has multiple hospitalization in the past secondary to decompensated liver failure. Patient is currently on transplant team. During current admission patient was getting lasix and albumin intermittently for VICKY however renal function was not getting better. Patient hospital course complicated by hepatic encephalopathy with some improvement of mentation with lactulose. Also treated for cellulitis with 7 days of vancomycin.Nephrology is consulted for worsening/not improving VICKY despite lasix/albumin.     Per chart review patient has no hypotensive episodes, BP mostly above 100's. Has not received nephrotoxins such as NSAIDs/contrast media. No sever sepsis/septic shock or acute blood loss during current admit. UA with 2+ leukocytes and Hyaline casts, no wbc/rbc cast or proteinuria. Urine Na+ < 20.    Patient was transferred to ICU on 7/13/2018 after being more lethargic and hypoactive.  After two days was stepped down back to [facility].       Interval History: no acute events overnight. Doing well this am. Good uop, unrecorded. Interval decrease in Cr 3.6-->3.4    Review of patient's allergies indicates:   Allergen Reactions    Nsaids (non-steroidal anti-inflammatory drug)      D/t to liver disease.    Penicillins Other (See Comments)     Tolerated pip-tazo in 8/2016 without issue  Tolerated cefepime 7/2018 without issue  Since childhood was told not to take it     Current Facility-Administered  Medications   Medication Frequency    acetaminophen tablet 500 mg Q6H PRN    acetaminophen tablet 650 mg Q12H    acyclovir capsule 400 mg BID    atorvastatin tablet 40 mg Daily    bisacodyl suppository 10 mg Daily    dextrose 50% injection 12.5 g PRN    dextrose 50% injection 25 g PRN    docusate sodium capsule 100 mg Daily    epoetin dread injection 10,000 Units Every Tues, Thurs, Sat    ergocalciferol capsule 50,000 Units Q7 Days    fluconazole tablet 200 mg Daily    furosemide injection 80 mg BID    gelatin adsorbable 100cm top sponge 100 sponge 1 applicator PRN    glucagon (human recombinant) injection 1 mg PRN    glucose chewable tablet 16 g PRN    glucose chewable tablet 24 g PRN    heparin (porcine) injection 5,000 Units Q8H    insulin aspart U-100 pen 0-5 Units QID (AC + HS) PRN    insulin aspart U-100 pen 4 Units TIDWM    levETIRAcetam tablet 500 mg BID    magnesium oxide tablet 800 mg BID    miconazole nitrate 2% ointment BID    mycophenolate capsule 500 mg BID    ondansetron injection 4 mg Q8H PRN    oxyCODONE immediate release tablet 5 mg Q4H PRN    pantoprazole EC tablet 40 mg BID    predniSONE tablet 10 mg Daily    [START ON 8/19/2018] predniSONE tablet 5 mg Daily    ramelteon tablet 8 mg Nightly PRN    simethicone chewable tablet 80 mg TID PRN    sodium chloride 0.9% flush 3 mL PRN    tacrolimus capsule 4 mg BID       Objective:     Vital Signs (Most Recent):  Temp: 97.9 °F (36.6 °C) (08/14/18 1248)  Pulse: 76 (08/14/18 1251)  Resp: 18 (08/14/18 1251)  BP: 111/61 (08/14/18 1248)  SpO2: 97 % (08/14/18 1251)  O2 Device (Oxygen Therapy): room air (08/14/18 1251) Vital Signs (24h Range):  Temp:  [97.4 °F (36.3 °C)-98.7 °F (37.1 °C)] 97.9 °F (36.6 °C)  Pulse:  [65-86] 76  Resp:  [16-18] 18  SpO2:  [95 %-98 %] 97 %  BP: (111-152)/(61-74) 111/61     Weight: 127.6 kg (281 lb 4.9 oz) (08/14/18 0300)  Body mass index is 36.12 kg/m².  Body surface area is 2.58 meters  squared.    I/O last 3 completed shifts:  In: 1280 [P.O.:1280]  Out: 0     Physical Exam   Constitutional: He appears well-developed and well-nourished. No distress.   HENT:   Head: Normocephalic and atraumatic.   Eyes: Conjunctivae are normal. No scleral icterus.   Neck: Normal range of motion. Neck supple.   Cardiovascular: Normal rate, regular rhythm and normal heart sounds. Exam reveals no gallop and no friction rub.   No murmur heard.  Pulmonary/Chest: Effort normal and breath sounds normal. No respiratory distress. He has no rales.   Abdominal: Soft. Bowel sounds are normal. He exhibits no distension. There is no tenderness.   Musculoskeletal: Normal range of motion. He exhibits edema. He exhibits no tenderness.   Skin: Skin is warm and dry. Rash (b/l LE) noted. He is not diaphoretic. No erythema. No pallor.       Significant Labs:  CBC:   Recent Labs   Lab  08/14/18   0400   WBC  2.65*   RBC  2.55*   HGB  8.2*   HCT  25.4*   PLT  95*   MCV  100*   MCH  32.2*   MCHC  32.3     CMP:   Recent Labs   Lab  08/14/18   0400   GLU  78   CALCIUM  8.5*   ALBUMIN  2.6*   PROT  4.5*   NA  139   K  4.6   CO2  24   CL  106   BUN  61*   CREATININE  3.4*   ALKPHOS  55   ALT  16   AST  11   BILITOT  0.5          Assessment/Plan:     Acute kidney injury superimposed on chronic kidney disease    ATN on CKD    Continues with good urine output, although not recorded  Still edematous  Interval decrease in Cr    -No need for RRT, can remove trialysis  - Continue lasix at 80mg IV BID  - Strict I/Os, serial RFPs  - Avoid nephrotoxic medications  - Hb > 7gm/dL            Thank you for your consult. I will follow-up with patient. Please contact us if you have any additional questions.    Otoniel Velarde MD  Nephrology  [facility]    ATTENDING PHYSICIAN ATTESTATION  I have personally interviewed and examined the patient. I thoroughly reviewed the demographic, clinical, laboratorial and imaging information  available in medical records. I agree with the assessment and recommendations provided by the subspecialty resident. Dr. Velarde was under my supervision.

## 2018-08-14 NOTE — PROGRESS NOTES
Wound care follow up.   Patients groins are significantly improved and nearly healed. There is a small scab to the right groin.   The perianal area appears healed.   The legs a much less edematous. The coflex wraps were removed and legs washed. The coflex were reapplied. Patient tolerated well.   Patient states he can not tolerate the EHOB overlay but uses the cushion for the chair.   Continue barrier af cream to the groins and buttocks  coflex to the legs changed on tues fri 08/14/18 1103   [REMOVED]      Wound 06/09/18 0100 Other (comment) lower Leg   Final Assessment Date: 08/14/18  Date First Assessed/Time First Assessed: 06/09/18 0100   Pre-existing: Yes  Primary Wound Type: (c) Other (comment)  Side: (c)   Orientation: lower  Location: Leg  Wound Outcome: Healed   Wound Image         Wound 06/09/18 0100 Moisture associated dermatitis Buttocks   Date First Assessed/Time First Assessed: 06/09/18 0100   Pre-existing: Yes  Primary Wound Type: Moisture associated dermatitis  Location: Buttocks   Wound Image    Wound WDL WDL   Dressing Appearance Open to air   Drainage Amount None   Appearance Pink;Epithelialization   Periwound Area Intact   Wound Length (cm) 0 cm   Wound Width (cm) 0 cm   Wound Depth (cm) 0 cm   Wound Volume (cm^3) 0 cm^3       Wound 07/24/18 1500 Moisture associated dermatitis Groin   Date First Assessed/Time First Assessed: 07/24/18 1500   Pre-existing: No  Primary Wound Type: Moisture associated dermatitis  Side: Left  Location: Groin   Wound Image     Wound WDL ex   Dressing Appearance Open to air   Drainage Amount None   Appearance Pink   Tissue loss description Partial thickness   Periwound Area Intact   Wound Length (cm) 1.5 cm  (right groin scab)   Wound Width (cm) 1.5 cm   Wound Depth (cm) 0.1 cm   Wound Volume (cm^3) 0.22 cm^3   Care Skin Barrier

## 2018-08-14 NOTE — PLAN OF CARE
Problem: Patient Care Overview  Goal: Plan of Care Review  Outcome: Ongoing (interventions implemented as appropriate)  AAOx3, afebrile, c/o pain. Pain in shoulders controlled by prn pain medication. Telemetry monitor in place (NSR). BG monitored ACHS- no coverage needed. Self meds and incision care performed per mother 100%. Barrier cream applied to buttocks area. Pt able to sit on side of bed with stand by assist. Pt in lowest position, side rails up x3, non-skid foot wear in place, call light within reach, pt verbalized understanding to call RN when needed.  Hand hygiene practiced per protocol. Will continue to monitor.

## 2018-08-14 NOTE — ASSESSMENT & PLAN NOTE
- With CKD3.  - Expected VICKY post txp.   - UOP unclear as pt unable to measure urine (severe scrotal edema- improving) though feels UOP improving.  - Tolerated HD 8/1 and 8/3.   - Nephrology following and HD per their recs.   - Lasix 120 mg given 8/9/18.    - Cr trended 4 ->3.8-> 3.6-> 3.4.  BUN trending up slow, patient on Pred taper.  - Continue Lasix 80 mg bid.

## 2018-08-14 NOTE — ASSESSMENT & PLAN NOTE
No previous history of DM  BG goal 140-180.     Decrease novolog 4 units with meals.   BG monitoring ac/hs and low dose correction scale.     Discharge:  TBD.

## 2018-08-14 NOTE — ASSESSMENT & PLAN NOTE
ATN on CKD    Continues with good urine output, although not recorded  Still edematous  Interval decrease in Cr    -No need for RRT, can remove trialysis  - Continue lasix at 80mg IV BID  - Strict I/Os, serial RFPs  - Avoid nephrotoxic medications  - Hb > 7gm/dL

## 2018-08-14 NOTE — PLAN OF CARE
Problem: Patient Care Overview  Goal: Plan of Care Review  Outcome: Ongoing (interventions implemented as appropriate)  Fall precautions maintained. No falls or injuries noted this shift. Patient up to side of bed for lunch and worked with PT today. Tolerated well. Bilateral should pain being managed with scheduled tylenol. Tramadol dc'd. CBG being monitored before meals, between 81-150s. Endocrinology following patient, adjustments made today. Afebrile. Scrotal edema being managed with IV lasix. Pad counts continued. All personal items in close reach. POC discussed with patient and mother. Will monitor.

## 2018-08-14 NOTE — CONSULTS
OSNF consult will have no beds until maybe Friday will re-evaluate Thursday may need long term skilled

## 2018-08-14 NOTE — SUBJECTIVE & OBJECTIVE
Interval History: no acute events overnight. Doing well this am. Good uop, unrecorded. Interval decrease in Cr 3.6-->3.4    Review of patient's allergies indicates:   Allergen Reactions    Nsaids (non-steroidal anti-inflammatory drug)      D/t to liver disease.    Penicillins Other (See Comments)     Tolerated pip-tazo in 8/2016 without issue  Tolerated cefepime 7/2018 without issue  Since childhood was told not to take it     Current Facility-Administered Medications   Medication Frequency    acetaminophen tablet 500 mg Q6H PRN    acetaminophen tablet 650 mg Q12H    acyclovir capsule 400 mg BID    atorvastatin tablet 40 mg Daily    bisacodyl suppository 10 mg Daily    dextrose 50% injection 12.5 g PRN    dextrose 50% injection 25 g PRN    docusate sodium capsule 100 mg Daily    epoetin dread injection 10,000 Units Every Tues, Thurs, Sat    ergocalciferol capsule 50,000 Units Q7 Days    fluconazole tablet 200 mg Daily    furosemide injection 80 mg BID    gelatin adsorbable 100cm top sponge 100 sponge 1 applicator PRN    glucagon (human recombinant) injection 1 mg PRN    glucose chewable tablet 16 g PRN    glucose chewable tablet 24 g PRN    heparin (porcine) injection 5,000 Units Q8H    insulin aspart U-100 pen 0-5 Units QID (AC + HS) PRN    insulin aspart U-100 pen 4 Units TIDWM    levETIRAcetam tablet 500 mg BID    magnesium oxide tablet 800 mg BID    miconazole nitrate 2% ointment BID    mycophenolate capsule 500 mg BID    ondansetron injection 4 mg Q8H PRN    oxyCODONE immediate release tablet 5 mg Q4H PRN    pantoprazole EC tablet 40 mg BID    predniSONE tablet 10 mg Daily    [START ON 8/19/2018] predniSONE tablet 5 mg Daily    ramelteon tablet 8 mg Nightly PRN    simethicone chewable tablet 80 mg TID PRN    sodium chloride 0.9% flush 3 mL PRN    tacrolimus capsule 4 mg BID       Objective:     Vital Signs (Most Recent):  Temp: 97.9 °F (36.6 °C) (08/14/18 1248)  Pulse: 76  (08/14/18 1251)  Resp: 18 (08/14/18 1251)  BP: 111/61 (08/14/18 1248)  SpO2: 97 % (08/14/18 1251)  O2 Device (Oxygen Therapy): room air (08/14/18 1251) Vital Signs (24h Range):  Temp:  [97.4 °F (36.3 °C)-98.7 °F (37.1 °C)] 97.9 °F (36.6 °C)  Pulse:  [65-86] 76  Resp:  [16-18] 18  SpO2:  [95 %-98 %] 97 %  BP: (111-152)/(61-74) 111/61     Weight: 127.6 kg (281 lb 4.9 oz) (08/14/18 0300)  Body mass index is 36.12 kg/m².  Body surface area is 2.58 meters squared.    I/O last 3 completed shifts:  In: 1280 [P.O.:1280]  Out: 0     Physical Exam   Constitutional: He appears well-developed and well-nourished. No distress.   HENT:   Head: Normocephalic and atraumatic.   Eyes: Conjunctivae are normal. No scleral icterus.   Neck: Normal range of motion. Neck supple.   Cardiovascular: Normal rate, regular rhythm and normal heart sounds. Exam reveals no gallop and no friction rub.   No murmur heard.  Pulmonary/Chest: Effort normal and breath sounds normal. No respiratory distress. He has no rales.   Abdominal: Soft. Bowel sounds are normal. He exhibits no distension. There is no tenderness.   Musculoskeletal: Normal range of motion. He exhibits edema. He exhibits no tenderness.   Skin: Skin is warm and dry. Rash (b/l LE) noted. He is not diaphoretic. No erythema. No pallor.       Significant Labs:  CBC:   Recent Labs   Lab  08/14/18   0400   WBC  2.65*   RBC  2.55*   HGB  8.2*   HCT  25.4*   PLT  95*   MCV  100*   MCH  32.2*   MCHC  32.3     CMP:   Recent Labs   Lab  08/14/18   0400   GLU  78   CALCIUM  8.5*   ALBUMIN  2.6*   PROT  4.5*   NA  139   K  4.6   CO2  24   CL  106   BUN  61*   CREATININE  3.4*   ALKPHOS  55   ALT  16   AST  11   BILITOT  0.5

## 2018-08-14 NOTE — SUBJECTIVE & OBJECTIVE
"Interval HPI:   Overnight events: remains in TSU. BG at goal with scheduled novolog; prandial excursions noted. Prednisone 10 mg daily; standard steroid taper. Awaiting SNF placement. Physical therapy at bedside.   Eatin% - 50%   Nausea: No  Hypoglycemia and intervention: No  Fever: No  TPN and/or TF: No    /62 (BP Location: Left arm, Patient Position: Lying)   Pulse 70   Temp 97.6 °F (36.4 °C) (Oral)   Resp 17   Ht 6' 2" (1.88 m)   Wt 127.6 kg (281 lb 4.9 oz)   SpO2 98%   BMI 36.12 kg/m²     Labs Reviewed and Include    Recent Labs   Lab  18   0400   GLU  78   CALCIUM  8.5*   ALBUMIN  2.6*   PROT  4.5*   NA  139   K  4.6   CO2  24   CL  106   BUN  61*   CREATININE  3.4*   ALKPHOS  55   ALT  16   AST  11   BILITOT  0.5     Lab Results   Component Value Date    WBC 2.65 (L) 2018    HGB 8.2 (L) 2018    HCT 25.4 (L) 2018     (H) 2018    PLT 95 (L) 2018     No results for input(s): TSH, FREET4 in the last 168 hours.  Lab Results   Component Value Date    HGBA1C 5.2 2018       Nutritional status:   Body mass index is 36.12 kg/m².  Lab Results   Component Value Date    ALBUMIN 2.6 (L) 2018    ALBUMIN 2.5 (L) 2018    ALBUMIN 2.5 (L) 2018     Lab Results   Component Value Date    PREALBUMIN 4 (L) 2018    PREALBUMIN 5 (L) 2018    PREALBUMIN 7 (L) 2016       Estimated Creatinine Clearance: 32 mL/min (A) (based on SCr of 3.4 mg/dL (H)).    Accu-Checks  Recent Labs      18   2115  18   0729  18   1135  18   1659  18   0820  18   1207  18   1655  18   2214  18   0722   POCTGLUCOSE  145*  118*  105  153*  126*  91  127*  144*  110  81       Current Medications and/or Treatments Impacting Glycemic Control  Immunotherapy:    Immunosuppressants         Stop Route Frequency     tacrolimus capsule 4 mg      -- Oral 2 times daily     mycophenolate capsule 1,000 " mg      -- Oral 2 times daily        Steroids:   Hormones (From admission, onward)    Start     Stop Route Frequency Ordered    08/12/18 0900  predniSONE tablet 10 mg      08/19 0859 Oral Daily 08/09/18 0835        Pressors:    Autonomic Drugs (From admission, onward)    None        Hyperglycemia/Diabetes Medications:   Antihyperglycemics (From admission, onward)    Start     Stop Route Frequency Ordered    08/13/18 1130  insulin aspart U-100 pen 5 Units      -- SubQ 3 times daily with meals 08/13/18 1032    07/27/18 1805  insulin aspart U-100 pen 0-5 Units      -- SubQ Before meals & nightly PRN 07/27/18 1705

## 2018-08-14 NOTE — PROGRESS NOTES
"[facility]  Endocrinology  Progress Note    Admit Date: 2018     Reason for Consult: Management of hypothermia and followed by consult on steroid induced hyperglycemia     Diabetes diagnosis year: none      HPI:   Patient is a 63 y.o. 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 Hospital course was complicated by hepatic encephalopathy. During the course, he has also had few episodes of hypothermia with temp as low as 93 F. He completed 7 day course of vancomycin for cellilitis, and is currently on vancomycin and cefepime for possible sepsis. However he continues to be hypothermic.     Further work-up of hypothermia was done, which showed TSH: 2.714, FT4: 0.53, AM cortisol: 9.9.    Interval HPI:   Overnight events: remains in TSU. BG at goal with scheduled novolog; prandial excursions noted. Prednisone 10 mg daily; standard steroid taper. Awaiting SNF placement. Physical therapy at bedside.   Eatin% - 50%   Nausea: No  Hypoglycemia and intervention: No  Fever: No  TPN and/or TF: No    /62 (BP Location: Left arm, Patient Position: Lying)   Pulse 70   Temp 97.6 °F (36.4 °C) (Oral)   Resp 17   Ht 6' 2" (1.88 m)   Wt 127.6 kg (281 lb 4.9 oz)   SpO2 98%   BMI 36.12 kg/m²      Labs Reviewed and Include    Recent Labs   Lab  18   0400   GLU  78   CALCIUM  8.5*   ALBUMIN  2.6*   PROT  4.5*   NA  139   K  4.6   CO2  24   CL  106   BUN  61*   CREATININE  3.4*   ALKPHOS  55   ALT  16   AST  11   BILITOT  0.5     Lab Results   Component Value Date    WBC 2.65 (L) 2018    HGB 8.2 (L) 2018    HCT 25.4 (L) 2018     (H) 2018    PLT 95 (L) 2018 "     No results for input(s): TSH, FREET4 in the last 168 hours.  Lab Results   Component Value Date    HGBA1C 5.2 07/22/2018       Nutritional status:   Body mass index is 36.12 kg/m².  Lab Results   Component Value Date    ALBUMIN 2.6 (L) 08/14/2018    ALBUMIN 2.5 (L) 08/13/2018    ALBUMIN 2.5 (L) 08/12/2018     Lab Results   Component Value Date    PREALBUMIN 4 (L) 06/30/2018    PREALBUMIN 5 (L) 01/22/2018    PREALBUMIN 7 (L) 08/31/2016       Estimated Creatinine Clearance: 32 mL/min (A) (based on SCr of 3.4 mg/dL (H)).    Accu-Checks  Recent Labs      08/11/18   2115  08/12/18   0729  08/12/18   1135  08/12/18   1659  08/12/18 2015 08/13/18   0820  08/13/18   1207  08/13/18   1655  08/13/18   2214  08/14/18   0722   POCTGLUCOSE  145*  118*  105  153*  126*  91  127*  144*  110  81       Current Medications and/or Treatments Impacting Glycemic Control  Immunotherapy:    Immunosuppressants         Stop Route Frequency     tacrolimus capsule 4 mg      -- Oral 2 times daily     mycophenolate capsule 1,000 mg      -- Oral 2 times daily        Steroids:   Hormones (From admission, onward)    Start     Stop Route Frequency Ordered    08/12/18 0900  predniSONE tablet 10 mg      08/19 0859 Oral Daily 08/09/18 0835        Pressors:    Autonomic Drugs (From admission, onward)    None        Hyperglycemia/Diabetes Medications:   Antihyperglycemics (From admission, onward)    Start     Stop Route Frequency Ordered    08/13/18 1130  insulin aspart U-100 pen 5 Units      -- SubQ 3 times daily with meals 08/13/18 1032    07/27/18 1805  insulin aspart U-100 pen 0-5 Units      -- SubQ Before meals & nightly PRN 07/27/18 1705          ASSESSMENT and PLAN    * Liver transplanted    S/p transplanted.  Currently on scheduled steroid taper.  Per transplant  Avoid hypoglycemia.         Steroid-induced hyperglycemia    No previous history of DM  BG goal 140-180.     Decrease novolog 4 units with meals.   BG monitoring ac/hs and low  dose correction scale.     Discharge:  TBD.          Long-term use of immunosuppressant medication    May increase insulin resistance.             Stage II pressure ulcer of sacral region    Optimize BG.           CKD (chronic kidney disease), stage III    Avoid insulin stacking and hypoglycemia.  Lab Results   Component Value Date    CREATININE 3.4 (H) 08/14/2018               Acute kidney injury superimposed on chronic kidney disease      Avoid insulin stacking            Alma Snyder NP  Endocrinology  [facility]

## 2018-08-14 NOTE — ASSESSMENT & PLAN NOTE
Avoid insulin stacking and hypoglycemia.  Lab Results   Component Value Date    CREATININE 3.4 (H) 08/14/2018

## 2018-08-14 NOTE — PLAN OF CARE
Problem: Patient Care Overview  Goal: Plan of Care Review  Outcome: Ongoing (interventions implemented as appropriate)  Recommendation/Intervention:   1. Continue current diet order, supplements  2. Encourage good po intake   3. Dietitian Following    Goals:   1. >85% EEN & EPN    Nutrition Goal Status: progressing towards goal    Nutrition Discharge Planning: adequate po intake. Post transplant education provided.

## 2018-08-14 NOTE — ASSESSMENT & PLAN NOTE
- Was on intermittent dialysis prior to transplant  - Tolerated HD well 8/1. Last HD 8/3.   - BLE wrapped by wound care- last changed 8/14.  Significant improvement.

## 2018-08-14 NOTE — PROGRESS NOTES
[facility]  Liver Transplant  Progress Note    Patient Name: Alon Adamson  MRN: [id number]  Admission Date: 2018  Hospital Length of Stay: 53 days  Code Status: Full Code  Primary Care Provider: Mckinley Vides MD  Post-Operative Day: 22    ORGAN:   LIVER  Disease Etiology: Alcoholic Cirrhosis  Donor Type:    - Brain Death  CDC High Risk:   No  Donor CMV Status:   Donor CMV Status: Negative  Donor HBcAB:   Negative  Donor HCV Status:   Negative  Donor HBV TAMAR: Negative  Donor HCV TAMAR: Negative  Whole or Partial: Whole Liver  Biliary Anastomosis: End to End  Arterial Anatomy: Standard  Subjective:     History of Present Illness:  Mr. Adamson is a 64 yo M with PMH ESLD secondary to ETOH cirrhosis, CKD3, CAD.  Complications of liver disease include hyperbilirubinemia, hypoalbuminemia, severe malnutrition,   hepatic encephalopathy, thrombocytopenia, splenomegaly, ascites, hypervolemia, coagulopathy, portal HTN.  Pt admitted  for acutely worsening hepatic encephalopathy and concern for BLE cellulitis as well as severe volume overload.  Complications of ELSD managed while inpt and he received liver offer on 18.  Pt underwent liver transplant without complication: steroid induction, DBD, CMV -/-.  Donor with E coli bacteremia resistant to cipro placed on Rocephin x 2 weeks per ID recs.  Also on fluconazole x 30 days as pt very ill prior to transplant. Post transplant, with nice trend down of AST/ALT and LFTs.  Liver U/S with elevated RIs and decreased HAV.  During initial post operative period, pt required pressor support.  Pt with expected post operative VICKY as with VICKY prior to surgery.  Nephrology consulted and pt placed on intermittent CRRT.  Pressor requirement decreased and pt tolerated CRRT without pressors.  Pt with asymptomatic bradycardia while in ICU prompting cardiology consult. Pt without need for acute intervention but atropine placed at bedside.  HR has improved POD  7 and 8.  He was transferred to the TSU 7/30 PM.          Liver US 7/31 showed small fluid collection likely an adrenal hemorrhage vs hematoma. H&H decreased to 6.6/20.3 8/1- responded to 1 u PRBCs. H/H stable 8/2 AM. Dropped again 8/3 AM- 1 u PRBC given. H&H now stable. Iron studies checked and pt started on epo.     Hospital Course:  Interval history: no acute events overnight. Nausea again this am but better.   Cr decreased from 3.6 to 3.4, BUN up to 61 (patient on Prednisone 10 mg).  Last HD 8/3/18.  Nephrology following- discussed removal of right trialysis cath.  Cont to diurese w lasix 80 mg bid.  Weight cont to decrease. LFTs remain wnl.  Patient working w PT/OT.  He is able to sit up at bedside w/o assist.  Patient was hyperkalemic over the wkd- Bactrim was held.  G6PD w normal activity.  Monitor.     Scheduled Meds:   acetaminophen  650 mg Oral Q12H    acyclovir  400 mg Oral BID    atorvastatin  40 mg Oral Daily    bisacodyl  10 mg Rectal Daily    docusate sodium  100 mg Oral Daily    epoetin dread  10,000 Units Intravenous Every Tues, Thurs, Sat    ergocalciferol  50,000 Units Oral Q7 Days    fluconazole  200 mg Oral Daily    furosemide  80 mg Intravenous BID    heparin (porcine)  5,000 Units Subcutaneous Q8H    insulin aspart U-100  4 Units Subcutaneous TIDWM    levETIRAcetam  500 mg Oral BID    magnesium oxide  800 mg Oral BID    miconazole nitrate 2%   Topical (Top) BID    mycophenolate  500 mg Oral BID    pantoprazole  40 mg Oral BID    predniSONE  10 mg Oral Daily    [START ON 8/19/2018] predniSONE  5 mg Oral Daily    tacrolimus  4 mg Oral BID     Continuous Infusions:  PRN Meds:acetaminophen, dextrose 50%, dextrose 50%, gelatin adsorbable 100cm top sponge, glucagon (human recombinant), glucose, glucose, insulin aspart U-100, ondansetron, oxyCODONE, ramelteon, simethicone, sodium chloride 0.9%    Review of Systems   Constitutional: Positive for activity change, appetite change and  fatigue. Negative for chills, diaphoresis and fever.   HENT: Negative for congestion and facial swelling.    Eyes: Negative for pain, discharge and visual disturbance.   Respiratory: Negative for cough, chest tightness, shortness of breath and wheezing.    Cardiovascular: Positive for leg swelling. Negative for chest pain and palpitations.   Gastrointestinal: Positive for abdominal distention. Negative for abdominal pain, constipation, diarrhea, nausea and vomiting.   Endocrine: Negative.    Genitourinary: Negative for decreased urine volume (Improving- unable to measure).   Musculoskeletal: Negative for back pain.   Skin: Positive for wound.   Allergic/Immunologic: Positive for immunocompromised state.   Neurological: Positive for weakness. Negative for dizziness, tremors and headaches.   Hematological: Negative.    Psychiatric/Behavioral: Negative for agitation, dysphoric mood and sleep disturbance. The patient is not nervous/anxious.      Objective:     Vital Signs (Most Recent):  Temp: 97.9 °F (36.6 °C) (08/14/18 1248)  Pulse: 78 (08/14/18 1459)  Resp: 18 (08/14/18 1251)  BP: 111/61 (08/14/18 1248)  SpO2: 97 % (08/14/18 1251) Vital Signs (24h Range):  Temp:  [97.4 °F (36.3 °C)-98.7 °F (37.1 °C)] 97.9 °F (36.6 °C)  Pulse:  [65-86] 78  Resp:  [16-18] 18  SpO2:  [95 %-98 %] 97 %  BP: (111-152)/(61-74) 111/61     Weight: 127.6 kg (281 lb 4.9 oz)  Body mass index is 36.12 kg/m².    Intake/Output - Last 3 Shifts     ** Patient Encounter Information Not Found **          Physical Exam   Constitutional: He is oriented to person, place, and time. He appears well-developed. No distress.   Temporal and distal extremity muscle wasting   HENT:   Head: Normocephalic and atraumatic.   Eyes: EOM are normal. Pupils are equal, round, and reactive to light. No scleral icterus.   Neck: Normal range of motion.   Cardiovascular: Normal rate, regular rhythm and normal heart sounds.   Pulmonary/Chest: Effort normal. No respiratory  distress. He has no wheezes.   Coarse BS   Abdominal: Soft. Bowel sounds are normal. He exhibits distension (ascites). There is no tenderness.   Chevron inc cdi no ssi   Musculoskeletal: He exhibits edema (2+ BLE ).   Neurological: He is alert and oriented to person, place, and time.   Skin: Skin is warm and dry. Capillary refill takes 2 to 3 seconds. He is not diaphoretic.   Psychiatric: He has a normal mood and affect. His behavior is normal. Judgment and thought content normal.   Nursing note and vitals reviewed.      Laboratory:  Immunosuppressants         Stop Route Frequency     mycophenolate capsule 500 mg      -- Oral 2 times daily     tacrolimus capsule 4 mg      -- Oral 2 times daily        CBC:   Recent Labs   Lab  08/14/18   0400   WBC  2.65*   RBC  2.55*   HGB  8.2*   HCT  25.4*   PLT  95*   MCV  100*   MCH  32.2*   MCHC  32.3     CMP:   Recent Labs   Lab  08/14/18   0400   GLU  78   CALCIUM  8.5*   ALBUMIN  2.6*   PROT  4.5*   NA  139   K  4.6   CO2  24   CL  106   BUN  61*   CREATININE  3.4*   ALKPHOS  55   ALT  16   AST  11   BILITOT  0.5     Tacrolimus Lvl   Date Value Ref Range Status   08/14/2018 7.0 5.0 - 15.0 ng/mL Final     Comment:     Testing performed by Liquid Chromatography-Tandem  Mass Spectrometry (LC-MS/MS).  This test was developed and its performance characteristics  determined by [facility], Department of Pathology  and Laboratory Medicine in a manner consistent with CLIA  requirements. It has not been cleared or approved by the [place]  Food and Drug Administration.  This test is used for clinical   purposes.  It should not be regarded as investigational or for  research.     08/13/2018 6.6 5.0 - 15.0 ng/mL Final     Comment:     Testing performed by Liquid Chromatography-Tandem  Mass Spectrometry (LC-MS/MS).  This test was developed and its performance characteristics  determined by [facility], Department of Pathology  and Laboratory Medicine in a manner  consistent with CLIA  requirements. It has not been cleared or approved by the [place]  Food and Drug Administration.  This test is used for clinical   purposes.  It should not be regarded as investigational or for  research.     08/12/2018 7.3 5.0 - 15.0 ng/mL Final     Comment:     Testing performed by Liquid Chromatography-Tandem  Mass Spectrometry (LC-MS/MS).  This test was developed and its performance characteristics  determined by [facility], Department of Pathology  and Laboratory Medicine in a manner consistent with CLIA  requirements. It has not been cleared or approved by the [place]  Food and Drug Administration.  This test is used for clinical   purposes.  It should not be regarded as investigational or for  research.     08/11/2018 7.0 5.0 - 15.0 ng/mL Final     Comment:     Testing performed by Liquid Chromatography-Tandem  Mass Spectrometry (LC-MS/MS).  This test was developed and its performance characteristics  determined by [facility], Department of Pathology  and Laboratory Medicine in a manner consistent with CLIA  requirements. It has not been cleared or approved by the [place]  Food and Drug Administration.  This test is used for clinical   purposes.  It should not be regarded as investigational or for  research.         Labs within the past 24 hours have been reviewed.    Diagnostic Results:  I have personally reviewed all pertinent imaging studies.    Assessment/Plan:     * Liver transplanted    - LFTs/enzymes normalized.  - POD 1 US with elevated RIs.  - Repeat liver U/S with stable, hyperechoic liver lesion, fluid collection inferior smaller, RIs persistently mildly elevated and mild to mod ascites.  Monitor.          Neutropenia    - wbc trending down.  Decreased Cellcept 500 mg.  Monitor.          Hypervolemia    - cont to have significant scrotal edema and abdominal swelling.  Nephrology following.  Last HD 8/3/18.  Received Lasix 120 mg on 8/9.  Pt unable to use urinal to  measure output due to edema.    - Cont Lasix 80 mg bid ordered per Nephrology recs.  - Cr trending down.  BUN w sl trend up. Monitor.           Long-term use of immunosuppressant medication    - see prophylactic immuno.           Prophylactic immunotherapy    - continue prograf, cellcept and prednisone. Monitor labs daily and adjust dose accordingly for a therapeutic level.           At risk for opportunistic infections    - Opportunistic infection prophylaxis will include Acyclovir for 3 months (CMV D- R-), Bactrim for 6 months, and Fluconazole.          Steroid-induced hyperglycemia    - Endocrine consulted.          CKD (chronic kidney disease), stage III    - See VICKY.  Slowly improving.          Stage II pressure ulcer of sacral region    - Wound care following.          Severe protein-calorie malnutrition    - Dietary consulted.  - Tolerating 2-3 supplements daily.  - Pt eating well.        Physical deconditioning    - Very SLOW to improve with PT/OT.  - SNF consulted 8/14/18.        Alteration in skin integrity    - Needs aggressive therapy and OOB.  - wd care changing dressing to legs bi-weekly.            Acute kidney injury superimposed on chronic kidney disease    - With CKD3.  - Expected VICKY post txp.   - UOP unclear as pt unable to measure urine (severe scrotal edema- improving) though feels UOP improving.  - Tolerated HD 8/1 and 8/3.   - Nephrology following and HD per their recs.   - Lasix 120 mg given 8/9/18.    - Cr trended 4 ->3.8-> 3.6-> 3.4.  BUN trending up slow, patient on Pred taper.  - Continue Lasix 80 mg bid.         Coronary artery disease involving native coronary artery of native heart without angina pectoris    - 2D Echo 7/9 normal EF (55-60%), trivial tricuspid/mitral regurg.  - Will need to assess when to resume home ASA.         Anemia of chronic disease    - Post transplant required blood transfusion 8/1 for H&H 6.6/20.3; 8/2 for H&H 6.4/19.7.  - Epo started on 8/3.  - PRBC 1 unit  last transfused 8/5.  - H&H remains stable.        Thrombocytopenia    - Secondary to splenomegaly from portal HTN- improving post transplant.        Hypoalbuminemia due to protein-calorie malnutrition    - appetite improved. He is drinking 2-3 supplements daily.  Pt states he is personally trying to increase protein in diet.            Seizures    - Continue oral Keppra.  - Keep Mg > 2.  On PO Mag.  - Mag Ox increased to 800 mg bid x 2 doses.         Bilateral edema of lower extremity    - Was on intermittent dialysis prior to transplant  - Tolerated HD well 8/1. Last HD 8/3.   - BLE wrapped by wound care- last changed 8/14.  Significant improvement.              VTE Risk Mitigation (From admission, onward)        Ordered     heparin (porcine) injection 5,000 Units  Every 8 hours      07/26/18 1330     Place sequential compression device  Until discontinued      07/23/18 1326     IP VTE HIGH RISK PATIENT  Once      07/23/18 1242          The patients clinical status was discussed at multidisplinary rounds, involving transplant surgery, transplant medicine, pharmacy, nursing, nutrition, and social work    Discharge Planning:  SNF consult placed.  Possible d/c to SNF later in the week.    Ana Paula Cleary NP  Liver Transplant  [facility]

## 2018-08-14 NOTE — ASSESSMENT & PLAN NOTE
- cont to have significant scrotal edema and abdominal swelling.  Nephrology following.  Last HD 8/3/18.  Received Lasix 120 mg on 8/9.  Pt unable to use urinal to measure output due to edema.    - Cont Lasix 80 mg bid ordered per Nephrology recs.  - Cr trending down.  BUN w sl trend up. Monitor.

## 2018-08-14 NOTE — SUBJECTIVE & OBJECTIVE
Scheduled Meds:   acetaminophen  650 mg Oral Q12H    acyclovir  400 mg Oral BID    atorvastatin  40 mg Oral Daily    bisacodyl  10 mg Rectal Daily    docusate sodium  100 mg Oral Daily    epoetin dread  10,000 Units Intravenous Every Tues, Thurs, Sat    ergocalciferol  50,000 Units Oral Q7 Days    fluconazole  200 mg Oral Daily    furosemide  80 mg Intravenous BID    heparin (porcine)  5,000 Units Subcutaneous Q8H    insulin aspart U-100  4 Units Subcutaneous TIDWM    levETIRAcetam  500 mg Oral BID    magnesium oxide  800 mg Oral BID    miconazole nitrate 2%   Topical (Top) BID    mycophenolate  500 mg Oral BID    pantoprazole  40 mg Oral BID    predniSONE  10 mg Oral Daily    [START ON 8/19/2018] predniSONE  5 mg Oral Daily    tacrolimus  4 mg Oral BID     Continuous Infusions:  PRN Meds:acetaminophen, dextrose 50%, dextrose 50%, gelatin adsorbable 100cm top sponge, glucagon (human recombinant), glucose, glucose, insulin aspart U-100, ondansetron, oxyCODONE, ramelteon, simethicone, sodium chloride 0.9%    Review of Systems   Constitutional: Positive for activity change, appetite change and fatigue. Negative for chills, diaphoresis and fever.   HENT: Negative for congestion and facial swelling.    Eyes: Negative for pain, discharge and visual disturbance.   Respiratory: Negative for cough, chest tightness, shortness of breath and wheezing.    Cardiovascular: Positive for leg swelling. Negative for chest pain and palpitations.   Gastrointestinal: Positive for abdominal distention. Negative for abdominal pain, constipation, diarrhea, nausea and vomiting.   Endocrine: Negative.    Genitourinary: Negative for decreased urine volume (Improving- unable to measure).   Musculoskeletal: Negative for back pain.   Skin: Positive for wound.   Allergic/Immunologic: Positive for immunocompromised state.   Neurological: Positive for weakness. Negative for dizziness, tremors and headaches.   Hematological:  Negative.    Psychiatric/Behavioral: Negative for agitation, dysphoric mood and sleep disturbance. The patient is not nervous/anxious.      Objective:     Vital Signs (Most Recent):  Temp: 97.9 °F (36.6 °C) (08/14/18 1248)  Pulse: 78 (08/14/18 1459)  Resp: 18 (08/14/18 1251)  BP: 111/61 (08/14/18 1248)  SpO2: 97 % (08/14/18 1251) Vital Signs (24h Range):  Temp:  [97.4 °F (36.3 °C)-98.7 °F (37.1 °C)] 97.9 °F (36.6 °C)  Pulse:  [65-86] 78  Resp:  [16-18] 18  SpO2:  [95 %-98 %] 97 %  BP: (111-152)/(61-74) 111/61     Weight: 127.6 kg (281 lb 4.9 oz)  Body mass index is 36.12 kg/m².    Intake/Output - Last 3 Shifts     ** Patient Encounter Information Not Found **          Physical Exam   Constitutional: He is oriented to person, place, and time. He appears well-developed. No distress.   Temporal and distal extremity muscle wasting   HENT:   Head: Normocephalic and atraumatic.   Eyes: EOM are normal. Pupils are equal, round, and reactive to light. No scleral icterus.   Neck: Normal range of motion.   Cardiovascular: Normal rate, regular rhythm and normal heart sounds.   Pulmonary/Chest: Effort normal. No respiratory distress. He has no wheezes.   Coarse BS   Abdominal: Soft. Bowel sounds are normal. He exhibits distension (ascites). There is no tenderness.   Chevron inc cdi no ssi   Musculoskeletal: He exhibits edema (2+ BLE ).   Neurological: He is alert and oriented to person, place, and time.   Skin: Skin is warm and dry. Capillary refill takes 2 to 3 seconds. He is not diaphoretic.   Psychiatric: He has a normal mood and affect. His behavior is normal. Judgment and thought content normal.   Nursing note and vitals reviewed.      Laboratory:  Immunosuppressants         Stop Route Frequency     mycophenolate capsule 500 mg      -- Oral 2 times daily     tacrolimus capsule 4 mg      -- Oral 2 times daily        CBC:   Recent Labs   Lab  08/14/18   0400   WBC  2.65*   RBC  2.55*   HGB  8.2*   HCT  25.4*   PLT  95*   MCV   100*   MCH  32.2*   MCHC  32.3     CMP:   Recent Labs   Lab  08/14/18   0400   GLU  78   CALCIUM  8.5*   ALBUMIN  2.6*   PROT  4.5*   NA  139   K  4.6   CO2  24   CL  106   BUN  61*   CREATININE  3.4*   ALKPHOS  55   ALT  16   AST  11   BILITOT  0.5     Tacrolimus Lvl   Date Value Ref Range Status   08/14/2018 7.0 5.0 - 15.0 ng/mL Final     Comment:     Testing performed by Liquid Chromatography-Tandem  Mass Spectrometry (LC-MS/MS).  This test was developed and its performance characteristics  determined by [facility], Department of Pathology  and Laboratory Medicine in a manner consistent with CLIA  requirements. It has not been cleared or approved by the [place]  Food and Drug Administration.  This test is used for clinical   purposes.  It should not be regarded as investigational or for  research.     08/13/2018 6.6 5.0 - 15.0 ng/mL Final     Comment:     Testing performed by Liquid Chromatography-Tandem  Mass Spectrometry (LC-MS/MS).  This test was developed and its performance characteristics  determined by [facility], Department of Pathology  and Laboratory Medicine in a manner consistent with CLIA  requirements. It has not been cleared or approved by the [place]  Food and Drug Administration.  This test is used for clinical   purposes.  It should not be regarded as investigational or for  research.     08/12/2018 7.3 5.0 - 15.0 ng/mL Final     Comment:     Testing performed by Liquid Chromatography-Tandem  Mass Spectrometry (LC-MS/MS).  This test was developed and its performance characteristics  determined by [facility], Department of Pathology  and Laboratory Medicine in a manner consistent with CLIA  requirements. It has not been cleared or approved by the [place]  Food and Drug Administration.  This test is used for clinical   purposes.  It should not be regarded as investigational or for  research.     08/11/2018 7.0 5.0 - 15.0 ng/mL Final     Comment:     Testing performed by Liquid  Chromatography-Tandem  Mass Spectrometry (LC-MS/MS).  This test was developed and its performance characteristics  determined by [facility], Department of Pathology  and Laboratory Medicine in a manner consistent with CLIA  requirements. It has not been cleared or approved by the [place]  Food and Drug Administration.  This test is used for clinical   purposes.  It should not be regarded as investigational or for  research.         Labs within the past 24 hours have been reviewed.    Diagnostic Results:  I have personally reviewed all pertinent imaging studies.

## 2018-08-14 NOTE — PT/OT/SLP PROGRESS
Physical Therapy Treatment    Patient Name:  Alon Adamson   MRN:  [id number]    Recommendations:     Discharge Recommendations:  nursing facility, skilled   Discharge Equipment Recommendations: (TBD)   Barriers to discharge: Decreased caregiver support    Assessment:     Alon Adamson is a 63 y.o. male admitted with a medical diagnosis of Liver transplanted.  He presents with the following impairments/functional limitations:  weakness, impaired endurance, impaired self care skills, impaired functional mobilty, gait instability, impaired balance, decreased coordination, decreased lower extremity function, pain, impaired skin, impaired joint extensibility, impaired muscle length, decreased upper extremity function limiting overall functional mobility. Supervision provided for safety with bed mobility. Sit-stand with elevated bed using rolling walker with Mod-Max Assist to facilitate hip and knee extension. No evidence of knee buckling noted with transfer or dynamic standing activities. Session limited secondary for patient's need to have BM. To benefit from continued skilled PT intervention to address deficits.    Rehab Prognosis:  Good; patient would benefit from acute skilled PT services to address these deficits and reach maximum level of function.      Recent Surgery: Procedure(s) (LRB):  TRANSPLANT, LIVER (N/A) 22 Days Post-Op    Plan:     During this hospitalization, patient to be seen 4 x/week to address the above listed problems via gait training, therapeutic activities, therapeutic exercises, neuromuscular re-education  · Plan of Care Expires:  08/26/18   Plan of Care Reviewed with: patient, mother    Subjective     Communicated with RN prior to session.  Patient found supine with mother present upon PT entry to room, agreeable to treatment.      Chief Complaint: needing to have BM post-session  Pain/Comfort:  ·  no reports of pain    Patients cultural, spiritual, Bahai conflicts given the  current situation: none noted     Objective:     Patient found with: telemetry, central line     General Precautions: Standard, fall   Orthopedic Precautions:N/A   Braces:   NA    Functional Mobility:  · Bed Mobility:  Rolling Left:  supervision  · Rolling Right: supervision  · Scooting: supervision  · Supine to Sit: supervision  · Sit to Supine: supervision  · Transfers:  Sit to Stand:  maximal assistance with rolling walker and significantly elevated bed  · Balance: good static sitting balance edge of bed; Fair static/dynamic standing balance using rolling walker      AM-PAC 6 CLICK MOBILITY  Turning over in bed (including adjusting bedclothes, sheets and blankets)?: 3  Sitting down on and standing up from a chair with arms (e.g., wheelchair, bedside commode, etc.): 2  Moving from lying on back to sitting on the side of the bed?: 3  Moving to and from a bed to a chair (including a wheelchair)?: 2  Need to walk in hospital room?: 1  Climbing 3-5 steps with a railing?: 1  Basic Mobility Total Score: 12       Therapeutic Activities and Exercises:   Patient educated on:   - role of PT/POC    - safety with all functional mobility   - bed mobility training   - transfer training   - standing march using RW   - importance of participation with therapy services   - safest to transfer with and 1 person assist at this time.    Verbalized understanding of all education provided.    Patient able to stand approximately 5 mins with intermittent lateral weight shifts using rolling walker and CGA provided by writing therapist. Standing march performed with BLE using RW and CGA x 10 reps each. Patient with significant fatigue after marching and requested to return to supine position to have BM.    Writing therapist offered to notify RN; however, patient declined stating he would notify.      Patient left supine with all lines intact, call button in reach and mother present..    GOALS:   Multidisciplinary Problems     Physical  Therapy Goals        Problem: Physical Therapy Goal    Goal Priority Disciplines Outcome Goal Variances Interventions   Physical Therapy Goal     PT, PT/OT Ongoing (interventions implemented as appropriate)     Description:  Goals to be met by: 2018    Patient will increase functional independence with mobility by performin. Supine to sit with moderate assistance -  Met  Updated: Supine to sit with Supervision - met  Updated: Supine to sit with Mod I  2. Sit to stand with moderate assistance - met (2018)  Updated: Sit to stand with minimum assistance using rolling walker  3. Gait x 50ft with minimal assistance with RW. - not met  4. Pt will be able to perform hip flexion, hip abduction, and heel raises while standing with use of rolling walker (denoting improvement in balance)- Min A. Not met                                   Time Tracking:     PT Received On: 18  PT Start Time: 955     PT Stop Time: 1020  PT Total Time (min): 25 min     Billable Minutes: Therapeutic Activity 25    Treatment Type: Treatment  PT/PTA: PT     PTA Visit Number: 0     Sampson Fuller III, PT  2018

## 2018-08-14 NOTE — PLAN OF CARE
Problem: Physical Therapy Goal  Goal: Physical Therapy Goal  Goals to be met by: 2018    Patient will increase functional independence with mobility by performin. Supine to sit with moderate assistance -  Met  Updated: Supine to sit with Supervision - met  Updated: Supine to sit with Mod I  2. Sit to stand with moderate assistance - met (2018)  Updated: Sit to stand with minimum assistance using rolling walker  3. Gait x 50ft with minimal assistance with RW. - not met  4. Pt will be able to perform hip flexion, hip abduction, and heel raises while standing with use of rolling walker (denoting improvement in balance)- Min A. Not met                 Outcome: Ongoing (interventions implemented as appropriate)  Goals updated to reflect progress.    Sampson Fuller III, CHARLEST, PT  2018

## 2018-08-15 PROBLEM — R60.1 GENERALIZED EDEMA: Status: ACTIVE | Noted: 2018-08-01

## 2018-08-15 LAB
ALBUMIN SERPL BCP-MCNC: 2.7 G/DL
ALP SERPL-CCNC: 58 U/L
ALT SERPL W/O P-5'-P-CCNC: 16 U/L
ANION GAP SERPL CALC-SCNC: 10 MMOL/L
AST SERPL-CCNC: 13 U/L
BASOPHILS # BLD AUTO: 0.03 K/UL
BASOPHILS NFR BLD: 1 %
BILIRUB SERPL-MCNC: 0.5 MG/DL
BUN SERPL-MCNC: 60 MG/DL
CALCIUM SERPL-MCNC: 8.8 MG/DL
CHLORIDE SERPL-SCNC: 104 MMOL/L
CMV DNA SERPL NAA+PROBE-ACNC: NORMAL IU/ML
CO2 SERPL-SCNC: 22 MMOL/L
CREAT SERPL-MCNC: 3.4 MG/DL
DIFFERENTIAL METHOD: ABNORMAL
EOSINOPHIL # BLD AUTO: 0.1 K/UL
EOSINOPHIL NFR BLD: 2 %
ERYTHROCYTE [DISTWIDTH] IN BLOOD BY AUTOMATED COUNT: 17.5 %
EST. GFR  (AFRICAN AMERICAN): 21.1 ML/MIN/1.73 M^2
EST. GFR  (NON AFRICAN AMERICAN): 18.3 ML/MIN/1.73 M^2
GLUCOSE SERPL-MCNC: 109 MG/DL
HCT VFR BLD AUTO: 27 %
HGB BLD-MCNC: 9 G/DL
IMM GRANULOCYTES # BLD AUTO: 0.01 K/UL
IMM GRANULOCYTES NFR BLD AUTO: 0.3 %
LYMPHOCYTES # BLD AUTO: 0.4 K/UL
LYMPHOCYTES NFR BLD: 12.5 %
MAGNESIUM SERPL-MCNC: 1.4 MG/DL
MCH RBC QN AUTO: 33 PG
MCHC RBC AUTO-ENTMCNC: 33.3 G/DL
MCV RBC AUTO: 99 FL
MONOCYTES # BLD AUTO: 0.4 K/UL
MONOCYTES NFR BLD: 13.5 %
NEUTROPHILS # BLD AUTO: 2.1 K/UL
NEUTROPHILS NFR BLD: 70.7 %
NRBC BLD-RTO: 0 /100 WBC
PHOSPHATE SERPL-MCNC: 4.6 MG/DL
PLATELET # BLD AUTO: 103 K/UL
PMV BLD AUTO: 10.5 FL
POCT GLUCOSE: 130 MG/DL (ref 70–110)
POCT GLUCOSE: 153 MG/DL (ref 70–110)
POCT GLUCOSE: 171 MG/DL (ref 70–110)
POCT GLUCOSE: 98 MG/DL (ref 70–110)
POTASSIUM SERPL-SCNC: 4.8 MMOL/L
PROT SERPL-MCNC: 4.7 G/DL
RBC # BLD AUTO: 2.73 M/UL
SODIUM SERPL-SCNC: 136 MMOL/L
TACROLIMUS BLD-MCNC: 9.1 NG/ML
WBC # BLD AUTO: 3.03 K/UL

## 2018-08-15 PROCEDURE — 63600175 PHARM REV CODE 636 W HCPCS: Performed by: NURSE PRACTITIONER

## 2018-08-15 PROCEDURE — 84100 ASSAY OF PHOSPHORUS: CPT

## 2018-08-15 PROCEDURE — 80197 ASSAY OF TACROLIMUS: CPT

## 2018-08-15 PROCEDURE — 83735 ASSAY OF MAGNESIUM: CPT

## 2018-08-15 PROCEDURE — 97535 SELF CARE MNGMENT TRAINING: CPT

## 2018-08-15 PROCEDURE — 25000003 PHARM REV CODE 250: Performed by: NURSE PRACTITIONER

## 2018-08-15 PROCEDURE — 99232 SBSQ HOSP IP/OBS MODERATE 35: CPT | Mod: GC,,, | Performed by: INTERNAL MEDICINE

## 2018-08-15 PROCEDURE — 99231 SBSQ HOSP IP/OBS SF/LOW 25: CPT | Mod: ,,, | Performed by: NURSE PRACTITIONER

## 2018-08-15 PROCEDURE — 99233 SBSQ HOSP IP/OBS HIGH 50: CPT | Mod: 24,,, | Performed by: PHYSICIAN ASSISTANT

## 2018-08-15 PROCEDURE — 80053 COMPREHEN METABOLIC PANEL: CPT

## 2018-08-15 PROCEDURE — 63600175 PHARM REV CODE 636 W HCPCS: Performed by: PHYSICIAN ASSISTANT

## 2018-08-15 PROCEDURE — 25000003 PHARM REV CODE 250: Performed by: STUDENT IN AN ORGANIZED HEALTH CARE EDUCATION/TRAINING PROGRAM

## 2018-08-15 PROCEDURE — 97112 NEUROMUSCULAR REEDUCATION: CPT

## 2018-08-15 PROCEDURE — 63600175 PHARM REV CODE 636 W HCPCS: Performed by: STUDENT IN AN ORGANIZED HEALTH CARE EDUCATION/TRAINING PROGRAM

## 2018-08-15 PROCEDURE — 85025 COMPLETE CBC W/AUTO DIFF WBC: CPT

## 2018-08-15 PROCEDURE — 20600001 HC STEP DOWN PRIVATE ROOM

## 2018-08-15 PROCEDURE — 25000003 PHARM REV CODE 250: Performed by: SURGERY

## 2018-08-15 PROCEDURE — 94664 DEMO&/EVAL PT USE INHALER: CPT

## 2018-08-15 PROCEDURE — 99222 1ST HOSP IP/OBS MODERATE 55: CPT | Mod: ,,, | Performed by: NURSE PRACTITIONER

## 2018-08-15 PROCEDURE — 94761 N-INVAS EAR/PLS OXIMETRY MLT: CPT

## 2018-08-15 PROCEDURE — 25000003 PHARM REV CODE 250: Performed by: PHYSICIAN ASSISTANT

## 2018-08-15 PROCEDURE — 97110 THERAPEUTIC EXERCISES: CPT

## 2018-08-15 PROCEDURE — 36415 COLL VENOUS BLD VENIPUNCTURE: CPT

## 2018-08-15 RX ORDER — ASPIRIN 81 MG/1
81 TABLET ORAL DAILY
Status: DISCONTINUED | OUTPATIENT
Start: 2018-08-15 | End: 2018-08-17 | Stop reason: HOSPADM

## 2018-08-15 RX ORDER — INSULIN ASPART 100 [IU]/ML
4 INJECTION, SOLUTION INTRAVENOUS; SUBCUTANEOUS
Status: COMPLETED | OUTPATIENT
Start: 2018-08-15 | End: 2018-08-15

## 2018-08-15 RX ORDER — TACROLIMUS 1 MG/1
3 CAPSULE ORAL 2 TIMES DAILY
Status: DISCONTINUED | OUTPATIENT
Start: 2018-08-15 | End: 2018-08-17 | Stop reason: HOSPADM

## 2018-08-15 RX ADMIN — MAGNESIUM OXIDE TAB 400 MG (241.3 MG ELEMENTAL MG) 800 MG: 400 (241.3 MG) TAB at 08:08

## 2018-08-15 RX ADMIN — HEPARIN SODIUM 5000 UNITS: 5000 INJECTION, SOLUTION INTRAVENOUS; SUBCUTANEOUS at 01:08

## 2018-08-15 RX ADMIN — FUROSEMIDE 80 MG: 10 INJECTION, SOLUTION INTRAMUSCULAR; INTRAVENOUS at 05:08

## 2018-08-15 RX ADMIN — SIMETHICONE CHEW TAB 80 MG 80 MG: 80 TABLET ORAL at 08:08

## 2018-08-15 RX ADMIN — INSULIN ASPART 4 UNITS: 100 INJECTION, SOLUTION INTRAVENOUS; SUBCUTANEOUS at 08:08

## 2018-08-15 RX ADMIN — HEPARIN SODIUM 5000 UNITS: 5000 INJECTION, SOLUTION INTRAVENOUS; SUBCUTANEOUS at 08:08

## 2018-08-15 RX ADMIN — PANTOPRAZOLE SODIUM 40 MG: 40 TABLET, DELAYED RELEASE ORAL at 08:08

## 2018-08-15 RX ADMIN — TACROLIMUS 3 MG: 1 CAPSULE ORAL at 05:08

## 2018-08-15 RX ADMIN — FUROSEMIDE 80 MG: 10 INJECTION, SOLUTION INTRAMUSCULAR; INTRAVENOUS at 08:08

## 2018-08-15 RX ADMIN — TACROLIMUS 4 MG: 1 CAPSULE ORAL at 08:08

## 2018-08-15 RX ADMIN — OXYCODONE HYDROCHLORIDE 5 MG: 5 TABLET ORAL at 01:08

## 2018-08-15 RX ADMIN — MICONAZOLE NITRATE: 20 OINTMENT TOPICAL at 09:08

## 2018-08-15 RX ADMIN — MICONAZOLE NITRATE: 20 OINTMENT TOPICAL at 08:08

## 2018-08-15 RX ADMIN — ACYCLOVIR 400 MG: 200 CAPSULE ORAL at 08:08

## 2018-08-15 RX ADMIN — FLUCONAZOLE 200 MG: 200 TABLET ORAL at 08:08

## 2018-08-15 RX ADMIN — ACETAMINOPHEN 650 MG: 325 TABLET, FILM COATED ORAL at 08:08

## 2018-08-15 RX ADMIN — ATORVASTATIN CALCIUM 40 MG: 20 TABLET, FILM COATED ORAL at 08:08

## 2018-08-15 RX ADMIN — HEPARIN SODIUM 5000 UNITS: 5000 INJECTION, SOLUTION INTRAVENOUS; SUBCUTANEOUS at 05:08

## 2018-08-15 RX ADMIN — SIMETHICONE CHEW TAB 80 MG 80 MG: 80 TABLET ORAL at 01:08

## 2018-08-15 RX ADMIN — LEVETIRACETAM 500 MG: 500 TABLET ORAL at 08:08

## 2018-08-15 RX ADMIN — MYCOPHENOLATE MOFETIL 500 MG: 250 CAPSULE ORAL at 08:08

## 2018-08-15 RX ADMIN — RAMELTEON 8 MG: 8 TABLET, FILM COATED ORAL at 10:08

## 2018-08-15 RX ADMIN — ONDANSETRON 4 MG: 2 INJECTION INTRAMUSCULAR; INTRAVENOUS at 04:08

## 2018-08-15 RX ADMIN — ASPIRIN 81 MG: 81 TABLET, COATED ORAL at 11:08

## 2018-08-15 RX ADMIN — INSULIN ASPART 4 UNITS: 100 INJECTION, SOLUTION INTRAVENOUS; SUBCUTANEOUS at 01:08

## 2018-08-15 RX ADMIN — PREDNISONE 10 MG: 10 TABLET ORAL at 08:08

## 2018-08-15 RX ADMIN — INSULIN ASPART 4 UNITS: 100 INJECTION, SOLUTION INTRAVENOUS; SUBCUTANEOUS at 05:08

## 2018-08-15 RX ADMIN — OXYCODONE HYDROCHLORIDE 5 MG: 5 TABLET ORAL at 04:08

## 2018-08-15 NOTE — SUBJECTIVE & OBJECTIVE
"Interval HPI:   Overnight events: remains in TSU. BG at goal with scheduled novolog; not requiring correction scale. Prednisone 10 mg daily. Awaiting SNF placement.   Eatin-  50%  Nausea: No  Hypoglycemia and intervention: No  Fever: No  TPN and/or TF: No    /67 (Patient Position: Lying)   Pulse 73   Temp 98 °F (36.7 °C) (Oral)   Resp 16   Ht 6' 2" (1.88 m)   Wt 125.9 kg (277 lb 9 oz)   SpO2 98%   BMI 35.64 kg/m²     Labs Reviewed and Include    Recent Labs   Lab  08/15/18   0453   GLU  109   CALCIUM  8.8   ALBUMIN  2.7*   PROT  4.7*   NA  136   K  4.8   CO2  22*   CL  104   BUN  60*   CREATININE  3.4*   ALKPHOS  58   ALT  16   AST  13   BILITOT  0.5     Lab Results   Component Value Date    WBC 3.03 (L) 08/15/2018    HGB 9.0 (L) 08/15/2018    HCT 27.0 (L) 08/15/2018    MCV 99 (H) 08/15/2018     (L) 08/15/2018     No results for input(s): TSH, FREET4 in the last 168 hours.  Lab Results   Component Value Date    HGBA1C 5.2 2018       Nutritional status:   Body mass index is 35.64 kg/m².  Lab Results   Component Value Date    ALBUMIN 2.7 (L) 08/15/2018    ALBUMIN 2.6 (L) 2018    ALBUMIN 2.5 (L) 2018     Lab Results   Component Value Date    PREALBUMIN 4 (L) 2018    PREALBUMIN 5 (L) 2018    PREALBUMIN 7 (L) 2016       Estimated Creatinine Clearance: 31.8 mL/min (A) (based on SCr of 3.4 mg/dL (H)).    Accu-Checks  Recent Labs      18   0820  18   1207  18   1655  18   2214  18   0722  18   1153  18   1706  18   2102  08/15/18   0504  08/15/18   0733   POCTGLUCOSE  91  127*  144*  110  81  128*  151*  151*  130*  98       Current Medications and/or Treatments Impacting Glycemic Control  Immunotherapy:    Immunosuppressants         Stop Route Frequency     mycophenolate capsule 500 mg      -- Oral 2 times daily     tacrolimus capsule 4 mg      -- Oral 2 times daily        Steroids:   Hormones (From admission, " onward)    Start     Stop Route Frequency Ordered    08/19/18 0900  predniSONE tablet 5 mg      08/26 0859 Oral Daily 08/14/18 0957    08/12/18 0900  predniSONE tablet 10 mg      08/19 0859 Oral Daily 08/09/18 0835        Pressors:    Autonomic Drugs (From admission, onward)    None        Hyperglycemia/Diabetes Medications:   Antihyperglycemics (From admission, onward)    Start     Stop Route Frequency Ordered    08/14/18 1130  insulin aspart U-100 pen 4 Units      -- SubQ 3 times daily with meals 08/14/18 0912    07/27/18 1805  insulin aspart U-100 pen 0-5 Units      -- SubQ Before meals & nightly PRN 07/27/18 1706

## 2018-08-15 NOTE — ASSESSMENT & PLAN NOTE
-2/2 liver transplant with complicated hospital course     See hospital course for functional, cognitive/speech/language, and nutrition/swallow status.      Recommendations  -  Encourage mobility, OOB in chair at least 3 hours per day, and early ambulation as appropriate  -  PT/OT evaluate and treat  -  Pain management  -  Monitor for and prevent skin breakdown and pressure ulcers  · Early mobility, repositioning/weight shifting every 20-30 minutes when sitting, turn patient every 2 hours, proper mattress/overlay and chair cushioning, pressure relief/heel protector boots  -  DVT prophylaxis:  [facility]  -  Reviewed discharge options (IP rehab, SNF, HH therapy, and OP therapy)

## 2018-08-15 NOTE — ASSESSMENT & PLAN NOTE
ATN on CKD    Continues with good urine output, although not recorded  Still edematous LE b/l    -No need for RRT at this time  - Continue lasix at 80mg IV BID  - Strict I/Os, serial RFPs  - Avoid nephrotoxic medications  - Hb > 7gm/dL

## 2018-08-15 NOTE — CONSULTS
[facility]  Physical Medicine & Rehab  Consult Note    Patient Name: Alon Adamson  MRN: [id number]  Admission Date: 6/22/2018  Hospital Length of Stay: 54 days  Attending Physician: Elan Guerra MD     Inpatient consult to Physical Medicine & Rehabilitation  Consult performed by: Deepali Simmons NP  Consult requested by:  Elan Guerra MD    Reason for Consult:  assess rehabilitation needs  Consults  Subjective:     Principal Problem: Liver transplanted    HPI: Alon Adamson is a 63-year-old male with PMHx of , CKD3, CAD, ESLD (2/2 ETOH cirrhosis) complications of liver disease include hyperbilirubinemia, hypoalbuminemia, severe malnutrition,  hepatic encephalopathy, thrombocytopenia, splenomegaly, ascites, hypervolemia, coagulopathy, & portal HTN.  Patient presented to [facility] on6/22 for acutely worsening hepatic encephalopathy and concern for BLE cellulitis as well as severe volume overload.  He continued to have complication of ESLD and received a liver off on 7/23.  He was transplanted successfully on 7/23. Donor with E coli bacteremia resistant to cipro placed on Rocephin x 2 weeks per ID which is now completed. Hospital course further complicated by VICKY (Nephrology followed-placed on intermittent HD and last session 8/3 and access has been d/c'd), elevated LFTs (resolved), anemia (s/p transfusions-now stable and  receiving Epo), nausea, BLE and scrotal edema (on Lasix 80 mg BID IV), hypokalemia (resolved), thrombocytopenia (2/2 splenomegaly from portal HTN), low serum Mg, and high serum Phos, & stage 3 sacral pressure ulcer (wound care following).     Functional History: Patient lives in [place], [place] with wife in a mobile home with 5 steps to enter.  Prior to admission, (I) with ADLs and sometimes mobility, but on most occasions utilized a RW within the home and WC within the community when feeling weak. DME: RW, WC.       Hospital Course: 07/27/2018: Evaluated by therapy.  Bed mobility, sit  to stand, and transfers TA.  All ADLs TA.   8/13/18: 08/15/2018: Participated with therapy.  Bed mobility SBA-Otto.  Sit to stand ModA x 2 ppl.  UBD ModA. LBD/toileting TA.  08/15/2018: Participated with therapy.  Bed mobility SV.  Sit to stand MaxA & RW.        Past Medical History:   Diagnosis Date    Anticoagulant long-term use     Arthritis     Back pain     Cellulitis     Cirrhosis     Coronary artery disease     DM (diabetes mellitus)     Hearing loss     right ear    Hepatic encephalopathy     Hypertension     diagnosed today (11/25/2015) and prescribed toprol    Leg edema     Nephrolithiasis     JACK (obstructive sleep apnea)     Seizures     Splenomegaly      Past Surgical History:   Procedure Laterality Date    APPENDECTOMY      Open    BACK SURGERY      CHOLECYSTECTOMY      laprascopic    LUMBAR DISCECTOMY      SPLENIC ARTERY EMBOLIZATION  04/08/2016    Dr Taveras    TONSILLECTOMY       Review of patient's allergies indicates:   Allergen Reactions    Nsaids (non-steroidal anti-inflammatory drug)      D/t to liver disease.    Penicillins Other (See Comments)     Tolerated pip-tazo in 8/2016 without issue  Tolerated cefepime 7/2018 without issue  Since childhood was told not to take it       Scheduled Medications:    acetaminophen  650 mg Oral Q12H    acyclovir  400 mg Oral BID    aspirin  81 mg Oral Daily    atorvastatin  40 mg Oral Daily    bisacodyl  10 mg Rectal Daily    docusate sodium  100 mg Oral Daily    epoetin dread  10,000 Units Intravenous Every Tues, Thurs, Sat    ergocalciferol  50,000 Units Oral Q7 Days    fluconazole  200 mg Oral Daily    furosemide  80 mg Intravenous BID    heparin (porcine)  5,000 Units Subcutaneous Q8H    insulin aspart U-100  4 Units Subcutaneous TIDWM    levETIRAcetam  500 mg Oral BID    magnesium oxide  800 mg Oral BID    miconazole nitrate 2%   Topical (Top) BID    mycophenolate  500 mg Oral BID    pantoprazole  40 mg Oral BID     predniSONE  10 mg Oral Daily    [START ON 8/19/2018] predniSONE  5 mg Oral Daily    tacrolimus  4 mg Oral BID       PRN Medications: acetaminophen, dextrose 50%, dextrose 50%, gelatin adsorbable 100cm top sponge, glucagon (human recombinant), glucose, glucose, insulin aspart U-100, ondansetron, oxyCODONE, ramelteon, simethicone, sodium chloride 0.9%    Family History     Unknown per patient.         Tobacco Use    Smoking status: Never Smoker    Smokeless tobacco: Current User     Types: Chew   Substance and Sexual Activity    Alcohol use: No     Alcohol/week: 0.0 oz    Drug use: No    Sexual activity: Not on file     Review of Systems   Constitutional: Positive for fatigue. Negative for chills and fever.   HENT: Negative for congestion and trouble swallowing.    Eyes: Negative for discharge.   Respiratory: Negative for cough, shortness of breath and wheezing.    Cardiovascular: Positive for leg swelling. Negative for chest pain and palpitations.   Gastrointestinal: Positive for nausea. Negative for abdominal pain.   Genitourinary: Positive for flank pain. Negative for difficulty urinating.   Musculoskeletal: Positive for gait problem. Negative for myalgias.   Skin: Negative for color change and rash.   Neurological: Positive for weakness. Negative for numbness.   Psychiatric/Behavioral: Negative for agitation and confusion.    Objective:     Vital Signs (Most Recent):  Temp: 97.9 °F (36.6 °C) (08/15/18 1140)  Pulse: 79 (08/15/18 1140)  Resp: 18 (08/15/18 1140)  BP: 133/61 (08/15/18 1140)  SpO2: 96 % (08/15/18 1140)    Vital Signs (24h Range):  Temp:  [97.9 °F (36.6 °C)-98.7 °F (37.1 °C)] 97.9 °F (36.6 °C)  Pulse:  [62-86] 79  Resp:  [16-18] 18  SpO2:  [94 %-98 %] 96 %  BP: (111-149)/(60-69) 133/61     Body mass index is 35.64 kg/m².    Physical Exam   Constitutional: He appears well-developed and well-nourished.   HENT:   Head: Normocephalic and atraumatic.   Eyes: Conjunctivae are normal. Right eye exhibits  no discharge. Left eye exhibits no discharge.   Neck: Neck supple.   Cardiovascular: Normal rate and regular rhythm.   2+ BLE edema    Pulmonary/Chest: Effort normal. No respiratory distress.   Abdominal: Soft. There is no tenderness.   Musculoskeletal: He exhibits no edema or deformity.   Neuro: AAOX3. No abnormal muscle tone. No sensory deficit.   Skin: Skin is warm and dry.   Dressing noted to R side of neck    Psychiatric: His behavior is normal. He exhibits a depressed mood.          Diagnostic Results:   Labs: Reviewed  ECG: Reviewed  X-Ray: Reviewed  US: Reviewed  CT: Reviewed    Assessment/Plan:     * Liver transplanted    -s/p liver trx 7/23         Neutropenia     -per Liver trx team, Cellcept 500 mg  -WBC 3 on 8/15         Hypervolemia    -on Lasix 80 mg BID IV   -BLE edema noted on exam         Stage II pressure ulcer of sacral region    -wound care following         Physical deconditioning    -2/2 liver transplant with complicated hospital course     See hospital course for functional, cognitive/speech/language, and nutrition/swallow status.      Recommendations  -  Encourage mobility, OOB in chair at least 3 hours per day, and early ambulation as appropriate  -  PT/OT evaluate and treat  -  Pain management  -  Monitor for and prevent skin breakdown and pressure ulcers  · Early mobility, repositioning/weight shifting every 20-30 minutes when sitting, turn patient every 2 hours, proper mattress/overlay and chair cushioning, pressure relief/heel protector boots  -  DVT prophylaxis:  [facility]  -  Reviewed discharge options (IP rehab, SNF, HH therapy, and OP therapy)          Acute kidney injury superimposed on chronic kidney disease    -Nephrology following  -currently BUN 60/Cr. 3.4   -access d/c'd   -on a steroid taper  -on Lasix 80 mg IVP BID         Anemia of chronic disease    -s/p transfusions  -H/H last transfusion 8/5  -currently stable         Thrombocytopenia    -2/2 splenomegaly from portal HTN,  currently improving post transplant        Bilateral edema of lower extremity    -on Lasix 80 mg IVP BID  -wrapped per wound care         Participating with therapy. With therapy needs. Will follow progress and discuss with rehab team for post acute care/rehab recommendation.      Thank you for your consult.     Deepali Simmons NP  Department of Physical Medicine & Rehab  [facility]

## 2018-08-15 NOTE — ASSESSMENT & PLAN NOTE
No previous history of DM  BG goal 140-180.     Continue novolog 4 units with meals; will discontinue after dinner.   Start januvia 25 mg daily tomorrow.   BG monitoring ac/hs and low dose correction scale.     Discharge:  TBD.

## 2018-08-15 NOTE — ASSESSMENT & PLAN NOTE
- Opportunistic infection prophylaxis will include Acyclovir for 3 months (CMV D- R-), Bactrim for 6 months, and Fluconazole x 30 days.

## 2018-08-15 NOTE — SUBJECTIVE & OBJECTIVE
Scheduled Meds:   acetaminophen  650 mg Oral Q12H    acyclovir  400 mg Oral BID    aspirin  81 mg Oral Daily    atorvastatin  40 mg Oral Daily    bisacodyl  10 mg Rectal Daily    docusate sodium  100 mg Oral Daily    epoetin dread  10,000 Units Intravenous Every Tues, Thurs, Sat    ergocalciferol  50,000 Units Oral Q7 Days    fluconazole  200 mg Oral Daily    furosemide  80 mg Intravenous BID    heparin (porcine)  5,000 Units Subcutaneous Q8H    insulin aspart U-100  4 Units Subcutaneous TIDWM    levETIRAcetam  500 mg Oral BID    magnesium oxide  800 mg Oral BID    miconazole nitrate 2%   Topical (Top) BID    mycophenolate  500 mg Oral BID    pantoprazole  40 mg Oral BID    predniSONE  10 mg Oral Daily    [START ON 8/19/2018] predniSONE  5 mg Oral Daily    [START ON 8/16/2018] SITagliptin  25 mg Oral Daily    tacrolimus  3 mg Oral BID     Continuous Infusions:  PRN Meds:acetaminophen, dextrose 50%, dextrose 50%, gelatin adsorbable 100cm top sponge, glucagon (human recombinant), glucose, glucose, insulin aspart U-100, ondansetron, oxyCODONE, ramelteon, simethicone, sodium chloride 0.9%    Review of Systems   Constitutional: Positive for activity change, appetite change and fatigue. Negative for chills, diaphoresis and fever.   HENT: Negative for congestion and facial swelling.    Eyes: Negative for pain, discharge and visual disturbance.   Respiratory: Negative for cough, chest tightness, shortness of breath and wheezing.    Cardiovascular: Positive for leg swelling. Negative for chest pain and palpitations.   Gastrointestinal: Positive for abdominal distention. Negative for abdominal pain, constipation, diarrhea, nausea and vomiting.   Endocrine: Negative.    Genitourinary: Negative for decreased urine volume.   Musculoskeletal: Negative for back pain.   Skin: Positive for wound.   Allergic/Immunologic: Positive for immunocompromised state.   Neurological: Positive for weakness. Negative for  dizziness, tremors and headaches.   Hematological: Negative.    Psychiatric/Behavioral: Negative for agitation, dysphoric mood and sleep disturbance. The patient is not nervous/anxious.      Objective:     Vital Signs (Most Recent):  Temp: 97.9 °F (36.6 °C) (08/15/18 1140)  Pulse: 79 (08/15/18 1140)  Resp: 18 (08/15/18 1140)  BP: 133/61 (08/15/18 1140)  SpO2: 96 % (08/15/18 1140) Vital Signs (24h Range):  Temp:  [97.9 °F (36.6 °C)-98.7 °F (37.1 °C)] 97.9 °F (36.6 °C)  Pulse:  [62-80] 79  Resp:  [16-18] 18  SpO2:  [94 %-98 %] 96 %  BP: (119-149)/(60-69) 133/61     Weight: 125.9 kg (277 lb 9 oz)  Body mass index is 35.64 kg/m².    Intake/Output - Last 3 Shifts     ** Patient Encounter Information Not Found **          Physical Exam   Constitutional: He is oriented to person, place, and time. He appears well-developed. No distress.   Temporal and distal extremity muscle wasting   HENT:   Head: Normocephalic and atraumatic.   Eyes: EOM are normal. Pupils are equal, round, and reactive to light. No scleral icterus.   Neck: Normal range of motion.   Cardiovascular: Normal rate, regular rhythm and normal heart sounds.   Pulmonary/Chest: Effort normal. No respiratory distress. He has no wheezes.   Coarse BS   Abdominal: Soft. Bowel sounds are normal. He exhibits distension (ascites). There is no tenderness.   Chevron inc cdi no ssi   Musculoskeletal: He exhibits edema (2+ BLE ).   Neurological: He is alert and oriented to person, place, and time.   Skin: Skin is warm and dry. Capillary refill takes 2 to 3 seconds. He is not diaphoretic.   Psychiatric: He has a normal mood and affect. His behavior is normal. Judgment and thought content normal.   Nursing note and vitals reviewed.      Laboratory:  Immunosuppressants         Stop Route Frequency     tacrolimus capsule 3 mg      -- Oral 2 times daily     mycophenolate capsule 500 mg      -- Oral 2 times daily        CBC:   Recent Labs   Lab  08/15/18   0453   WBC  3.03*   RBC   2.73*   HGB  9.0*   HCT  27.0*   PLT  103*   MCV  99*   MCH  33.0*   MCHC  33.3     CMP:   Recent Labs   Lab  08/15/18   0453   GLU  109   CALCIUM  8.8   ALBUMIN  2.7*   PROT  4.7*   NA  136   K  4.8   CO2  22*   CL  104   BUN  60*   CREATININE  3.4*   ALKPHOS  58   ALT  16   AST  13   BILITOT  0.5     Tacrolimus Lvl   Date Value Ref Range Status   08/15/2018 9.1 5.0 - 15.0 ng/mL Final     Comment:     Testing performed by Liquid Chromatography-Tandem  Mass Spectrometry (LC-MS/MS).  This test was developed and its performance characteristics  determined by [facility], Department of Pathology  and Laboratory Medicine in a manner consistent with CLIA  requirements. It has not been cleared or approved by the [place]  Food and Drug Administration.  This test is used for clinical   purposes.  It should not be regarded as investigational or for  research.     08/14/2018 7.0 5.0 - 15.0 ng/mL Final     Comment:     Testing performed by Liquid Chromatography-Tandem  Mass Spectrometry (LC-MS/MS).  This test was developed and its performance characteristics  determined by [facility], Department of Pathology  and Laboratory Medicine in a manner consistent with CLIA  requirements. It has not been cleared or approved by the [place]  Food and Drug Administration.  This test is used for clinical   purposes.  It should not be regarded as investigational or for  research.     08/13/2018 6.6 5.0 - 15.0 ng/mL Final     Comment:     Testing performed by Liquid Chromatography-Tandem  Mass Spectrometry (LC-MS/MS).  This test was developed and its performance characteristics  determined by [facility], Department of Pathology  and Laboratory Medicine in a manner consistent with CLIA  requirements. It has not been cleared or approved by the [place]  Food and Drug Administration.  This test is used for clinical   purposes.  It should not be regarded as investigational or for  research.     08/12/2018 7.3 5.0 - 15.0 ng/mL  Final     Comment:     Testing performed by Liquid Chromatography-Tandem  Mass Spectrometry (LC-MS/MS).  This test was developed and its performance characteristics  determined by [facility], Department of Pathology  and Laboratory Medicine in a manner consistent with CLIA  requirements. It has not been cleared or approved by the [place]  Food and Drug Administration.  This test is used for clinical   purposes.  It should not be regarded as investigational or for  research.         Labs within the past 24 hours have been reviewed.    Diagnostic Results:  I have personally reviewed all pertinent imaging studies.

## 2018-08-15 NOTE — ASSESSMENT & PLAN NOTE
-Nephrology following  -currently BUN 60/Cr. 3.4   -access d/c'd   -on a steroid taper  -on Lasix 80 mg IVP BID

## 2018-08-15 NOTE — CONSULTS
Reviewed patient history and current admission.  Rehab team following.  Full consult to follow.    ADRIA Curry, FNP-C  Physical Medicine & Rehabilitation   08/15/2018  Spectralink: [id number]

## 2018-08-15 NOTE — SUBJECTIVE & OBJECTIVE
Interval History: No acute events overnight. Patient states has lots of uop. Cr stable at 3.4 (3.4 yesterday).    Review of patient's allergies indicates:   Allergen Reactions    Nsaids (non-steroidal anti-inflammatory drug)      D/t to liver disease.    Penicillins Other (See Comments)     Tolerated pip-tazo in 8/2016 without issue  Tolerated cefepime 7/2018 without issue  Since childhood was told not to take it     Current Facility-Administered Medications   Medication Frequency    acetaminophen tablet 500 mg Q6H PRN    acetaminophen tablet 650 mg Q12H    acyclovir capsule 400 mg BID    aspirin EC tablet 81 mg Daily    atorvastatin tablet 40 mg Daily    bisacodyl suppository 10 mg Daily    dextrose 50% injection 12.5 g PRN    dextrose 50% injection 25 g PRN    docusate sodium capsule 100 mg Daily    epoetin dread injection 10,000 Units Every Tues, Thurs, Sat    ergocalciferol capsule 50,000 Units Q7 Days    fluconazole tablet 200 mg Daily    furosemide injection 80 mg BID    gelatin adsorbable 100cm top sponge 100 sponge 1 applicator PRN    glucagon (human recombinant) injection 1 mg PRN    glucose chewable tablet 16 g PRN    glucose chewable tablet 24 g PRN    heparin (porcine) injection 5,000 Units Q8H    insulin aspart U-100 pen 0-5 Units QID (AC + HS) PRN    insulin aspart U-100 pen 4 Units TIDWM    levETIRAcetam tablet 500 mg BID    magnesium oxide tablet 800 mg BID    miconazole nitrate 2% ointment BID    mycophenolate capsule 500 mg BID    ondansetron injection 4 mg Q8H PRN    oxyCODONE immediate release tablet 5 mg Q4H PRN    pantoprazole EC tablet 40 mg BID    predniSONE tablet 10 mg Daily    [START ON 8/19/2018] predniSONE tablet 5 mg Daily    ramelteon tablet 8 mg Nightly PRN    simethicone chewable tablet 80 mg TID PRN    sodium chloride 0.9% flush 3 mL PRN    tacrolimus capsule 4 mg BID       Objective:     Vital Signs (Most Recent):  Temp: 97.9 °F (36.6 °C) (08/15/18  1140)  Pulse: 79 (08/15/18 1140)  Resp: 18 (08/15/18 1140)  BP: 133/61 (08/15/18 1140)  SpO2: 96 % (08/15/18 1140)  O2 Device (Oxygen Therapy): room air (08/15/18 1140) Vital Signs (24h Range):  Temp:  [97.9 °F (36.6 °C)-98.7 °F (37.1 °C)] 97.9 °F (36.6 °C)  Pulse:  [62-86] 79  Resp:  [16-18] 18  SpO2:  [94 %-98 %] 96 %  BP: (111-149)/(60-69) 133/61     Weight: 125.9 kg (277 lb 9 oz) (08/15/18 0400)  Body mass index is 35.64 kg/m².  Body surface area is 2.56 meters squared.    I/O last 3 completed shifts:  In: 1850 [P.O.:1850]  Out: 0     Physical Exam   Constitutional: He appears well-developed and well-nourished. No distress.   HENT:   Head: Normocephalic and atraumatic.   Eyes: Conjunctivae are normal. No scleral icterus.   Neck: Normal range of motion. Neck supple.   Cardiovascular: Normal rate, regular rhythm and normal heart sounds. Exam reveals no gallop and no friction rub.   No murmur heard.  Pulmonary/Chest: Effort normal and breath sounds normal. No respiratory distress. He has no wheezes. He has no rales.   Abdominal: Soft. Bowel sounds are normal. He exhibits no distension. There is no tenderness.   Musculoskeletal: He exhibits edema. He exhibits no tenderness or deformity.   Skin: Skin is warm. He is not diaphoretic. No erythema. No pallor.       Significant Labs:  CBC:   Recent Labs   Lab  08/15/18   0453   WBC  3.03*   RBC  2.73*   HGB  9.0*   HCT  27.0*   PLT  103*   MCV  99*   MCH  33.0*   MCHC  33.3     CMP:   Recent Labs   Lab  08/15/18   0453   GLU  109   CALCIUM  8.8   ALBUMIN  2.7*   PROT  4.7*   NA  136   K  4.8   CO2  22*   CL  104   BUN  60*   CREATININE  3.4*   ALKPHOS  58   ALT  16   AST  13   BILITOT  0.5

## 2018-08-15 NOTE — ASSESSMENT & PLAN NOTE
Avoid insulin stacking and hypoglycemia.  Lab Results   Component Value Date    CREATININE 3.4 (H) 08/15/2018

## 2018-08-15 NOTE — ASSESSMENT & PLAN NOTE
- Improving with PT/OT.  - SNF consulted 8/14/18 and not likely to take patient  - Rehab consulted 8/15- pt is a candidate and can likely transfer 8/17

## 2018-08-15 NOTE — PROGRESS NOTES
[facility]  Liver Transplant  Progress Note    Patient Name: Alon Adamson  MRN: [id number]  Admission Date: 2018  Hospital Length of Stay: 54 days  Code Status: Full Code  Primary Care Provider: Mckinley Vides MD  Post-Operative Day:     ORGAN:   LIVER  Disease Etiology: Alcoholic Cirrhosis  Donor Type:    - Brain Death  CDC High Risk:   No  Donor CMV Status:   Donor CMV Status: Negative  Donor HBcAB:   Negative  Donor HCV Status:   Negative  Donor HBV TAMAR: Negative  Donor HCV TAMAR: Negative  Whole or Partial: Whole Liver  Biliary Anastomosis: End to End  Arterial Anatomy: Standard  Subjective:     History of Present Illness:  Mr. 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  Complications of liver disease include hyperbilirubinemia, hypoalbuminemia, severe malnutrition,   hepatic encephalopathy, thrombocytopenia, splenomegaly, ascites, hypervolemia, coagulopathy, portal HTN.  Pt admitted  for acutely worsening hepatic encephalopathy and concern for BLE cellulitis as well as severe volume overload.  Complications of ELSD managed while inpt and he received liver offer on 18.  Pt underwent liver transplant without complication: steroid induction, DBD, CMV -/-.  Donor with E coli bacteremia resistant to cipro placed on Rocephin x 2 weeks per ID recs.  Also on fluconazole x 30 days as pt very ill prior to transplant. Post transplant, with nice trend down of AST/ALT and LFTs.  Liver U/S with elevated RIs and decreased HAV.  During initial post operative period, pt required pressor support.  Pt with expected post operative VICKY as with VICKY prior to surgery.  Nephrology consulted and pt placed on intermittent CRRT.  Pressor requirement decreased and pt tolerated CRRT without pressors.  Pt with asymptomatic bradycardia while in ICU prompting cardiology consult. Pt without need for acute intervention but atropine placed at bedside.  HR has improved POD  7 and 8.  He was transferred to the TSU 7/30 PM.          Liver US 7/31 showed small fluid collection likely an adrenal hemorrhage vs hematoma. H&H decreased to 6.6/20.3 8/1- responded to 1 u PRBCs. H/H stable 8/2 AM. Dropped again 8/3 AM- 1 u PRBC given. H&H now stable. Iron studies checked and pt started on epo.     Hospital Course:  Interval history: no acute events overnight. Nausea again this am but better.   Last HD 8/3/18.  Right trialysis cath removed 8/14.  Cont to diurese w lasix 80 mg bid.  Cr 3.4 today.  Pt still unable to measure urine.  Weight cont to decrease. LFTs remain wnl.  Patient working w PT/OT.  He is able to sit up at bedside w/o assist.  Patient was hyperkalemic over the wkd- Bactrim was held.  G6PD w normal activity.  Rehab consulted today- pt potentially can transition to rehab Friday.  Plan to remove staples today or tomorrow.  Monitor.     Scheduled Meds:   acetaminophen  650 mg Oral Q12H    acyclovir  400 mg Oral BID    aspirin  81 mg Oral Daily    atorvastatin  40 mg Oral Daily    bisacodyl  10 mg Rectal Daily    docusate sodium  100 mg Oral Daily    epoetin dread  10,000 Units Intravenous Every Tues, Thurs, Sat    ergocalciferol  50,000 Units Oral Q7 Days    fluconazole  200 mg Oral Daily    furosemide  80 mg Intravenous BID    heparin (porcine)  5,000 Units Subcutaneous Q8H    insulin aspart U-100  4 Units Subcutaneous TIDWM    levETIRAcetam  500 mg Oral BID    magnesium oxide  800 mg Oral BID    miconazole nitrate 2%   Topical (Top) BID    mycophenolate  500 mg Oral BID    pantoprazole  40 mg Oral BID    predniSONE  10 mg Oral Daily    [START ON 8/19/2018] predniSONE  5 mg Oral Daily    [START ON 8/16/2018] SITagliptin  25 mg Oral Daily    tacrolimus  3 mg Oral BID     Continuous Infusions:  PRN Meds:acetaminophen, dextrose 50%, dextrose 50%, gelatin adsorbable 100cm top sponge, glucagon (human recombinant), glucose, glucose, insulin aspart U-100, ondansetron,  oxyCODONE, ramelteon, simethicone, sodium chloride 0.9%    Review of Systems   Constitutional: Positive for activity change, appetite change and fatigue. Negative for chills, diaphoresis and fever.   HENT: Negative for congestion and facial swelling.    Eyes: Negative for pain, discharge and visual disturbance.   Respiratory: Negative for cough, chest tightness, shortness of breath and wheezing.    Cardiovascular: Positive for leg swelling. Negative for chest pain and palpitations.   Gastrointestinal: Positive for abdominal distention. Negative for abdominal pain, constipation, diarrhea, nausea and vomiting.   Endocrine: Negative.    Genitourinary: Negative for decreased urine volume.   Musculoskeletal: Negative for back pain.   Skin: Positive for wound.   Allergic/Immunologic: Positive for immunocompromised state.   Neurological: Positive for weakness. Negative for dizziness, tremors and headaches.   Hematological: Negative.    Psychiatric/Behavioral: Negative for agitation, dysphoric mood and sleep disturbance. The patient is not nervous/anxious.      Objective:     Vital Signs (Most Recent):  Temp: 97.9 °F (36.6 °C) (08/15/18 1140)  Pulse: 79 (08/15/18 1140)  Resp: 18 (08/15/18 1140)  BP: 133/61 (08/15/18 1140)  SpO2: 96 % (08/15/18 1140) Vital Signs (24h Range):  Temp:  [97.9 °F (36.6 °C)-98.7 °F (37.1 °C)] 97.9 °F (36.6 °C)  Pulse:  [62-80] 79  Resp:  [16-18] 18  SpO2:  [94 %-98 %] 96 %  BP: (119-149)/(60-69) 133/61     Weight: 125.9 kg (277 lb 9 oz)  Body mass index is 35.64 kg/m².    Intake/Output - Last 3 Shifts     ** Patient Encounter Information Not Found **          Physical Exam   Constitutional: He is oriented to person, place, and time. He appears well-developed. No distress.   Temporal and distal extremity muscle wasting   HENT:   Head: Normocephalic and atraumatic.   Eyes: EOM are normal. Pupils are equal, round, and reactive to light. No scleral icterus.   Neck: Normal range of motion.    Cardiovascular: Normal rate, regular rhythm and normal heart sounds.   Pulmonary/Chest: Effort normal. No respiratory distress. He has no wheezes.   Coarse BS   Abdominal: Soft. Bowel sounds are normal. He exhibits distension (ascites). There is no tenderness.   Chevron inc cdi no ssi   Musculoskeletal: He exhibits edema (2+ BLE ).   Neurological: He is alert and oriented to person, place, and time.   Skin: Skin is warm and dry. Capillary refill takes 2 to 3 seconds. He is not diaphoretic.   Psychiatric: He has a normal mood and affect. His behavior is normal. Judgment and thought content normal.   Nursing note and vitals reviewed.      Laboratory:  Immunosuppressants         Stop Route Frequency     tacrolimus capsule 3 mg      -- Oral 2 times daily     mycophenolate capsule 500 mg      -- Oral 2 times daily        CBC:   Recent Labs   Lab  08/15/18   0453   WBC  3.03*   RBC  2.73*   HGB  9.0*   HCT  27.0*   PLT  103*   MCV  99*   MCH  33.0*   MCHC  33.3     CMP:   Recent Labs   Lab  08/15/18   0453   GLU  109   CALCIUM  8.8   ALBUMIN  2.7*   PROT  4.7*   NA  136   K  4.8   CO2  22*   CL  104   BUN  60*   CREATININE  3.4*   ALKPHOS  58   ALT  16   AST  13   BILITOT  0.5     Tacrolimus Lvl   Date Value Ref Range Status   08/15/2018 9.1 5.0 - 15.0 ng/mL Final     Comment:     Testing performed by Liquid Chromatography-Tandem  Mass Spectrometry (LC-MS/MS).  This test was developed and its performance characteristics  determined by [facility], Department of Pathology  and Laboratory Medicine in a manner consistent with CLIA  requirements. It has not been cleared or approved by the [place]  Food and Drug Administration.  This test is used for clinical   purposes.  It should not be regarded as investigational or for  research.     08/14/2018 7.0 5.0 - 15.0 ng/mL Final     Comment:     Testing performed by Liquid Chromatography-Tandem  Mass Spectrometry (LC-MS/MS).  This test was developed and its performance  characteristics  determined by [facility], Department of Pathology  and Laboratory Medicine in a manner consistent with CLIA  requirements. It has not been cleared or approved by the [place]  Food and Drug Administration.  This test is used for clinical   purposes.  It should not be regarded as investigational or for  research.     08/13/2018 6.6 5.0 - 15.0 ng/mL Final     Comment:     Testing performed by Liquid Chromatography-Tandem  Mass Spectrometry (LC-MS/MS).  This test was developed and its performance characteristics  determined by [facility], Department of Pathology  and Laboratory Medicine in a manner consistent with CLIA  requirements. It has not been cleared or approved by the [place]  Food and Drug Administration.  This test is used for clinical   purposes.  It should not be regarded as investigational or for  research.     08/12/2018 7.3 5.0 - 15.0 ng/mL Final     Comment:     Testing performed by Liquid Chromatography-Tandem  Mass Spectrometry (LC-MS/MS).  This test was developed and its performance characteristics  determined by [facility], Department of Pathology  and Laboratory Medicine in a manner consistent with CLIA  requirements. It has not been cleared or approved by the [place]  Food and Drug Administration.  This test is used for clinical   purposes.  It should not be regarded as investigational or for  research.         Labs within the past 24 hours have been reviewed.    Diagnostic Results:  I have personally reviewed all pertinent imaging studies.    Assessment/Plan:     * Liver transplanted    - LFTs/enzymes normalized.  - POD 1 US with elevated RIs.  - Repeat liver U/S with stable, hyperechoic liver lesion, fluid collection inferior smaller, RIs persistently mildly elevated and mild to mod ascites.  Monitor.          Neutropenia    - Decreased Cellcept 500 mg 8/14.     - Monitor.          Generalized edema    - cont to have significant scrotal edema and abdominal  swelling.  Nephrology following.  Last HD 8/3/18.  Received Lasix 120 mg on 8/9.  Pt unable to use urinal to measure output due to edema.     - Cont Lasix 80 mg bid ordered per Nephrology recs.  - Cr trending down.  BUN w sl trend up. Monitor.           Long-term use of immunosuppressant medication    - see prophylactic immuno.           Prophylactic immunotherapy    - Continue Prograf, Cellcept (half dose) and prednisone taper.   - Will monitor for signs of toxic side effects, check daily Prograf troughs, and change meds accordingly.          At risk for opportunistic infections    - Opportunistic infection prophylaxis will include Acyclovir for 3 months (CMV D- R-), Bactrim for 6 months, and Fluconazole x 30 days.          Steroid-induced hyperglycemia    - Endocrine consulted.          CKD (chronic kidney disease), stage III    - See VICKY.  Slowly improving.          Stage II pressure ulcer of sacral region    - Wound care following.          Severe protein-calorie malnutrition    - Dietary consulted.  - Tolerating 2-3 supplements daily.  - Pt eating well.        Physical deconditioning    - Improving with PT/OT.  - SNF consulted 8/14/18 and not likely to take patient  - Rehab consulted 8/15- pt is a candidate and can likely transfer 8/17        Alteration in skin integrity    - Needs aggressive therapy and OOB.  - wd care changing dressing to legs bi-weekly.            Acute kidney injury superimposed on chronic kidney disease    - With CKD3.  - Expected VICKY post txp.   - UOP unclear as pt unable to measure urine (severe scrotal edema- improving) though feels UOP improving.  - Tolerated HD 8/1 and 8/3.   - Nephrology following and HD per their recs.   - Lasix 120 mg given 8/9/18.    - Cr trended 4 ->3.8-> 3.6-> 3.4 ->3.4.  BUN trending up slow, patient on Pred taper.  - Continue Lasix 80 mg bid.         Coronary artery disease involving native coronary artery of native heart without angina pectoris    - 2D Echo  7/9 normal EF (55-60%), trivial tricuspid/mitral regurg.  - Will need to assess when to resume home ASA.         Anemia of chronic disease    - Post transplant required blood transfusion 8/1 for H&H 6.6/20.3; 8/2 for H&H 6.4/19.7.  - Epo started on 8/3.  - PRBC 1 unit last transfused 8/5.  - H&H remains stable.        Thrombocytopenia    - Secondary to splenomegaly from portal HTN- improving post transplant.        Hypoalbuminemia due to protein-calorie malnutrition    - appetite improved. He is drinking 2-3 supplements daily.  Pt states he is personally trying to increase protein in diet.            Seizures    - Continue oral Keppra.  - Keep Mg > 2.  On PO Mag.  - Mag Ox increased to 800 mg bid x 2 doses.         Bilateral edema of lower extremity    - Was on intermittent dialysis prior to transplant  - Tolerated HD well 8/1. Last HD 8/3.   - BLE wrapped by wound care- last changed 8/14.  Significant improvement.              VTE Risk Mitigation (From admission, onward)        Ordered     heparin (porcine) injection 5,000 Units  Every 8 hours      07/26/18 1330     Place sequential compression device  Until discontinued      07/23/18 1326     IP VTE HIGH RISK PATIENT  Once      07/23/18 1242          The patients clinical status was discussed at multidisplinary rounds, involving transplant surgery, transplant medicine, pharmacy, nursing, nutrition, and social work    Discharge Planning:  Tx to rehab Friday if bed available and insurance approves    Bebeto Virgen PA-C  Liver Transplant  [facility]

## 2018-08-15 NOTE — SUBJECTIVE & OBJECTIVE
Past Medical History:   Diagnosis Date    Anticoagulant long-term use     Arthritis     Back pain     Cellulitis     Cirrhosis     Coronary artery disease     DM (diabetes mellitus)     Hearing loss     right ear    Hepatic encephalopathy     Hypertension     diagnosed today (11/25/2015) and prescribed toprol    Leg edema     Nephrolithiasis     JACK (obstructive sleep apnea)     Seizures     Splenomegaly      Past Surgical History:   Procedure Laterality Date    APPENDECTOMY      Open    BACK SURGERY      CHOLECYSTECTOMY      laprascopic    LUMBAR DISCECTOMY      SPLENIC ARTERY EMBOLIZATION  04/08/2016    Dr Taveras    TONSILLECTOMY       Review of patient's allergies indicates:   Allergen Reactions    Nsaids (non-steroidal anti-inflammatory drug)      D/t to liver disease.    Penicillins Other (See Comments)     Tolerated pip-tazo in 8/2016 without issue  Tolerated cefepime 7/2018 without issue  Since childhood was told not to take it       Scheduled Medications:    acetaminophen  650 mg Oral Q12H    acyclovir  400 mg Oral BID    aspirin  81 mg Oral Daily    atorvastatin  40 mg Oral Daily    bisacodyl  10 mg Rectal Daily    docusate sodium  100 mg Oral Daily    epoetin dread  10,000 Units Intravenous Every Tues, Thurs, Sat    ergocalciferol  50,000 Units Oral Q7 Days    fluconazole  200 mg Oral Daily    furosemide  80 mg Intravenous BID    heparin (porcine)  5,000 Units Subcutaneous Q8H    insulin aspart U-100  4 Units Subcutaneous TIDWM    levETIRAcetam  500 mg Oral BID    magnesium oxide  800 mg Oral BID    miconazole nitrate 2%   Topical (Top) BID    mycophenolate  500 mg Oral BID    pantoprazole  40 mg Oral BID    predniSONE  10 mg Oral Daily    [START ON 8/19/2018] predniSONE  5 mg Oral Daily    tacrolimus  4 mg Oral BID       PRN Medications: acetaminophen, dextrose 50%, dextrose 50%, gelatin adsorbable 100cm top sponge, glucagon (human recombinant), glucose, glucose,  insulin aspart U-100, ondansetron, oxyCODONE, ramelteon, simethicone, sodium chloride 0.9%    Family History     None        Tobacco Use    Smoking status: Never Smoker    Smokeless tobacco: Current User     Types: Chew   Substance and Sexual Activity    Alcohol use: No     Alcohol/week: 0.0 oz    Drug use: No    Sexual activity: Not on file     Review of Systems   Constitutional: Positive for fatigue. Negative for chills and fever.   HENT: Positive for trouble swallowing.    Eyes: Negative for discharge.   Respiratory: Negative for cough, shortness of breath and wheezing.    Cardiovascular: Positive for leg swelling. Negative for chest pain and palpitations.   Gastrointestinal: Positive for nausea. Negative for abdominal pain.   Genitourinary: Positive for flank pain. Negative for difficulty urinating.   Musculoskeletal: Positive for gait problem. Negative for myalgias.   Skin: Negative for color change and rash.   Neurological: Positive for weakness. Negative for numbness.   Psychiatric/Behavioral: Negative for agitation and dysphoric mood.     Objective:     Vital Signs (Most Recent):  Temp: 97.9 °F (36.6 °C) (08/15/18 1140)  Pulse: 79 (08/15/18 1140)  Resp: 18 (08/15/18 1140)  BP: 133/61 (08/15/18 1140)  SpO2: 96 % (08/15/18 1140)    Vital Signs (24h Range):  Temp:  [97.9 °F (36.6 °C)-98.7 °F (37.1 °C)] 97.9 °F (36.6 °C)  Pulse:  [62-86] 79  Resp:  [16-18] 18  SpO2:  [94 %-98 %] 96 %  BP: (111-149)/(60-69) 133/61     Body mass index is 35.64 kg/m².    Physical Exam   Constitutional: He appears well-developed and well-nourished.   HENT:   Head: Normocephalic and atraumatic.   Eyes: Conjunctivae are normal. Right eye exhibits no discharge. Left eye exhibits no discharge.   Neck: Neck supple.   Cardiovascular: Normal rate and regular rhythm.   2+ BLE edema    Pulmonary/Chest: Effort normal. No respiratory distress.   Abdominal: Soft. There is no tenderness.   Musculoskeletal: He exhibits no edema or deformity.    Skin: Skin is warm and dry.   Dressing noted to R side of neck    Psychiatric: His behavior is normal. He exhibits a depressed mood.          Diagnostic Results:   Labs: Reviewed  ECG: Reviewed  X-Ray: Reviewed  US: Reviewed  CT: Reviewed

## 2018-08-15 NOTE — HPI
Alon Adamson is a 63-year-old male with PMHx of CKD3, CAD, ESLD (2/2 ETOH cirrhosis) complications of liver disease include hyperbilirubinemia, hypoalbuminemia, severe malnutrition,  hepatic encephalopathy, thrombocytopenia, splenomegaly, ascites, hypervolemia, coagulopathy, & portal HTN.  [place] [facility] [place] for acutely worsening hepatic encephalopathy and concern for BLE cellulitis as well as severe volume overload.  He continued to have complication of ESLD and received a liver off on 7/23.  He was transplanted successfully on 7/23. Donor with E coli bacteremia resistant to cipro placed on Rocephin x 2 weeks per ID which is now completed. Liver US 7/31 showed small fluid collection likely an adrenal hemorrhage vs hematoma.    Hospital course further complicated by VICKY (Nephrology following-placed on intermittent HD and last session 8/3 and access has been d/c'd), elevated LFTs (resolved), anemia (s/p transfusions-now stable and  receiving Epo), nausea, BLE and scrotal edema (on Lasix 80 mg BID IV), hypokalemia (resolved), thrombocytopenia (2/2 splenomegaly from portal HTN), low serum Mg, and high serum Phos, neutropenia (decreasing Cellcept, currently 2.5), & stage 3 sacral pressure ulcer (wound care following).     Functional History: Patient lives in [place], [place] with wife in a mobile home with 5 steps to enter.  Prior to admission, (I) with ADLs and sometimes mobility, but on most occasions utilized a RW within the home and WC within the community when feeling weak. DME: RW, WC.

## 2018-08-15 NOTE — PROGRESS NOTES
"[facility]  Endocrinology  Progress Note    Admit Date: 2018     Reason for Consult: Management of hypothermia and followed by consult on steroid induced hyperglycemia     Diabetes diagnosis year: >5 years by PCP but was later told he did not have diabetes. Was on glucovance for a short period of time but not taking any medication or checking BG prior to transplant.       HPI:   Patient is a 63 y.o. 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 Hospital course was complicated by hepatic encephalopathy. During the course, he has also had few episodes of hypothermia with temp as low as 93 F. He completed 7 day course of vancomycin for cellilitis, and is currently on vancomycin and cefepime for possible sepsis. However he continues to be hypothermic.     Further work-up of hypothermia was done, which showed TSH: 2.714, FT4: 0.53, AM cortisol: 9.9.    Interval HPI:   Overnight events: remains in TSU. BG at goal with scheduled novolog; not requiring correction scale. Prednisone 10 mg daily. Awaiting SNF placement.   Eatin-  50%  Nausea: No  Hypoglycemia and intervention: No  Fever: No  TPN and/or TF: No    /67 (Patient Position: Lying)   Pulse 73   Temp 98 °F (36.7 °C) (Oral)   Resp 16   Ht 6' 2" (1.88 m)   Wt 125.9 kg (277 lb 9 oz)   SpO2 98%   BMI 35.64 kg/m²      Labs Reviewed and Include    Recent Labs   Lab  08/15/18   0453   GLU  109   CALCIUM  8.8   ALBUMIN  2.7*   PROT  4.7*   NA  136   K  4.8   CO2  22*   CL  104   BUN  60*   CREATININE  3.4*   ALKPHOS  58   ALT  16   AST  13   BILITOT  0.5     Lab Results   Component Value Date    WBC 3.03 (L) 08/15/2018    HGB 9.0 (L) " 08/15/2018    HCT 27.0 (L) 08/15/2018    MCV 99 (H) 08/15/2018     (L) 08/15/2018     No results for input(s): TSH, FREET4 in the last 168 hours.  Lab Results   Component Value Date    HGBA1C 5.2 07/22/2018       Nutritional status:   Body mass index is 35.64 kg/m².  Lab Results   Component Value Date    ALBUMIN 2.7 (L) 08/15/2018    ALBUMIN 2.6 (L) 08/14/2018    ALBUMIN 2.5 (L) 08/13/2018     Lab Results   Component Value Date    PREALBUMIN 4 (L) 06/30/2018    PREALBUMIN 5 (L) 01/22/2018    PREALBUMIN 7 (L) 08/31/2016       Estimated Creatinine Clearance: 31.8 mL/min (A) (based on SCr of 3.4 mg/dL (H)).    Accu-Checks  Recent Labs      08/13/18   0820  08/13/18   1207  08/13/18   1655  08/13/18   2214  08/14/18   0722  08/14/18   1153  08/14/18   1706  08/14/18   2102  08/15/18   0504  08/15/18   0733   POCTGLUCOSE  91  127*  144*  110  81  128*  151*  151*  130*  98       Current Medications and/or Treatments Impacting Glycemic Control  Immunotherapy:    Immunosuppressants         Stop Route Frequency     mycophenolate capsule 500 mg      -- Oral 2 times daily     tacrolimus capsule 4 mg      -- Oral 2 times daily        Steroids:   Hormones (From admission, onward)    Start     Stop Route Frequency Ordered    08/19/18 0900  predniSONE tablet 5 mg      08/26 0859 Oral Daily 08/14/18 0957    08/12/18 0900  predniSONE tablet 10 mg      08/19 0859 Oral Daily 08/09/18 0835        Pressors:    Autonomic Drugs (From admission, onward)    None        Hyperglycemia/Diabetes Medications:   Antihyperglycemics (From admission, onward)    Start     Stop Route Frequency Ordered    08/14/18 1130  insulin aspart U-100 pen 4 Units      -- SubQ 3 times daily with meals 08/14/18 0912    07/27/18 1805  insulin aspart U-100 pen 0-5 Units      -- SubQ Before meals & nightly PRN 07/27/18 1705          ASSESSMENT and PLAN    * Liver transplanted    S/p transplanted.  Currently on scheduled steroid taper.  Per transplant  Avoid  hypoglycemia.         Steroid-induced hyperglycemia    No previous history of DM  BG goal 140-180.     Continue novolog 4 units with meals; will discontinue after dinner.   Start januvia 25 mg daily tomorrow.   BG monitoring ac/hs and low dose correction scale.     Discharge:  TBD.          Long-term use of immunosuppressant medication    May increase insulin resistance.             Stage II pressure ulcer of sacral region    Optimize BG.           CKD (chronic kidney disease), stage III    Avoid insulin stacking and hypoglycemia.  Lab Results   Component Value Date    CREATININE 3.4 (H) 08/15/2018               Acute kidney injury superimposed on chronic kidney disease      Avoid insulin stacking            Alma Snyder NP  Endocrinology  [facility]

## 2018-08-15 NOTE — PLAN OF CARE
Problem: Occupational Therapy Goal  Goal: Occupational Therapy Goal  Goals to be met by: 8/27/18    Patient will increase functional independence with ADLs by performing:     Upper body dressing while seated EOB with SBA  Grooming while standing at sink with Stand-by Assistance  Toileting from bedside commode with Minimal Assistance for hygiene and clothing management.  Toilet transfer to bedside commode with Contact Guard Assistance.  Pt will complete a functional static standing activity x ~4 minutes with CGA.   LB Dressing with Moderate Assistance            Outcome: Ongoing (interventions implemented as appropriate)  Pt progressing with goals. Continue with POC.   Aida buckley OT  8/15/2018

## 2018-08-15 NOTE — PLAN OF CARE
Problem: Patient Care Overview  Goal: Plan of Care Review  Outcome: Ongoing (interventions implemented as appropriate)  AAOx3, afebrile, c/o pain. Pain in shoulders and back. Pain mildly controlled by scheduled tylenol and heating packs. Self meds and incision care 100% per pt's mother. Dialysis line removed per NP order. BG monitored- no coverage needed.  Telemetry monitor in  Place (NSR). Pt sat on side of bed last night. Pt able to position self independently. Pt in lowest position, side rails up x3, non-skid foot wear in place, call light within reach, pt verbalized understanding to call RN when needed.  Hand hygiene practiced per protocol. Will continue to monitor.

## 2018-08-15 NOTE — ASSESSMENT & PLAN NOTE
- Continue Prograf, Cellcept (half dose) and prednisone taper.   - Will monitor for signs of toxic side effects, check daily Prograf troughs, and change meds accordingly.

## 2018-08-15 NOTE — HOSPITAL COURSE
07/27/2018: Evaluated by therapy.  Bed mobility, sit to stand, and transfers TA.  All ADLs TA.   8/13/18: 08/15/2018: Participated with therapy.  Bed mobility SBA-Otto.  Sit to stand ModA x 2 ppl.  UBD ModA. LBD/toileting TA.  08/15/2018: Participated with therapy.  Bed mobility SV-SBA. Sit to stand ModA x 2 ppl.  Ambulated 10 steps CGA-Otto . Sat EOB ~30 mins SV-SBA.UBD ModA. LBD TA.

## 2018-08-15 NOTE — PROGRESS NOTES
[facility]  Nephrology  Progress Note    Patient Name: Alon Adamson  MRN: [id number]  Admission Date: 6/22/2018  Hospital Length of Stay: 54 days  Attending Provider: Elan Guerra MD   Primary Care Physician: Mckinley Vides MD  Principal Problem:Liver transplanted    Subjective:     HPI: Mr. Adamson is 63 yr old male with decompensated alcoholic cirrhosis, CKD III and CAD admitted here on 06/22/18 admitted for bilateral LE hypervolemia and VICKY on CKD III with cr of 2.3 baseline around 1.5. Patient has multiple hospitalization in the past secondary to decompensated liver failure. Patient is currently on transplant team. During current admission patient was getting lasix and albumin intermittently for VICKY however renal function was not getting better. Patient hospital course complicated by hepatic encephalopathy with some improvement of mentation with lactulose. Also treated for cellulitis with 7 days of vancomycin.Nephrology is consulted for worsening/not improving VICKY despite lasix/albumin.     Per chart review patient has no hypotensive episodes, BP mostly above 100's. Has not received nephrotoxins such as NSAIDs/contrast media. No sever sepsis/septic shock or acute blood loss during current admit. UA with 2+ leukocytes and Hyaline casts, no wbc/rbc cast or proteinuria. Urine Na+ < 20.    Patient was transferred to ICU on 7/13/2018 after being more lethargic and hypoactive.  After two days was stepped down back to [facility].       Interval History: No acute events overnight. Patient states has lots of uop, not recorded. Cr stable at 3.4 (3.4 yesterday).    Review of patient's allergies indicates:   Allergen Reactions    Nsaids (non-steroidal anti-inflammatory drug)      D/t to liver disease.    Penicillins Other (See Comments)     Tolerated pip-tazo in 8/2016 without issue  Tolerated cefepime 7/2018 without issue  Since childhood was told not to take it     Current  Facility-Administered Medications   Medication Frequency    acetaminophen tablet 500 mg Q6H PRN    acetaminophen tablet 650 mg Q12H    acyclovir capsule 400 mg BID    aspirin EC tablet 81 mg Daily    atorvastatin tablet 40 mg Daily    bisacodyl suppository 10 mg Daily    dextrose 50% injection 12.5 g PRN    dextrose 50% injection 25 g PRN    docusate sodium capsule 100 mg Daily    epoetin dread injection 10,000 Units Every Tues, Thurs, Sat    ergocalciferol capsule 50,000 Units Q7 Days    fluconazole tablet 200 mg Daily    furosemide injection 80 mg BID    gelatin adsorbable 100cm top sponge 100 sponge 1 applicator PRN    glucagon (human recombinant) injection 1 mg PRN    glucose chewable tablet 16 g PRN    glucose chewable tablet 24 g PRN    heparin (porcine) injection 5,000 Units Q8H    insulin aspart U-100 pen 0-5 Units QID (AC + HS) PRN    insulin aspart U-100 pen 4 Units TIDWM    levETIRAcetam tablet 500 mg BID    magnesium oxide tablet 800 mg BID    miconazole nitrate 2% ointment BID    mycophenolate capsule 500 mg BID    ondansetron injection 4 mg Q8H PRN    oxyCODONE immediate release tablet 5 mg Q4H PRN    pantoprazole EC tablet 40 mg BID    predniSONE tablet 10 mg Daily    [START ON 8/19/2018] predniSONE tablet 5 mg Daily    ramelteon tablet 8 mg Nightly PRN    simethicone chewable tablet 80 mg TID PRN    sodium chloride 0.9% flush 3 mL PRN    tacrolimus capsule 4 mg BID       Objective:     Vital Signs (Most Recent):  Temp: 97.9 °F (36.6 °C) (08/15/18 1140)  Pulse: 79 (08/15/18 1140)  Resp: 18 (08/15/18 1140)  BP: 133/61 (08/15/18 1140)  SpO2: 96 % (08/15/18 1140)  O2 Device (Oxygen Therapy): room air (08/15/18 1140) Vital Signs (24h Range):  Temp:  [97.9 °F (36.6 °C)-98.7 °F (37.1 °C)] 97.9 °F (36.6 °C)  Pulse:  [62-86] 79  Resp:  [16-18] 18  SpO2:  [94 %-98 %] 96 %  BP: (111-149)/(60-69) 133/61     Weight: 125.9 kg (277 lb 9 oz) (08/15/18 0400)  Body mass index is 35.64  kg/m².  Body surface area is 2.56 meters squared.    I/O last 3 completed shifts:  In: 1850 [P.O.:1850]  Out: 0     Physical Exam   Constitutional: He appears well-developed and well-nourished. No distress.   HENT:   Head: Normocephalic and atraumatic.   Eyes: Conjunctivae are normal. No scleral icterus.   Neck: Normal range of motion. Neck supple.   Cardiovascular: Normal rate, regular rhythm and normal heart sounds. Exam reveals no gallop and no friction rub.   No murmur heard.  Pulmonary/Chest: Effort normal and breath sounds normal. No respiratory distress. He has no wheezes. He has no rales.   Abdominal: Soft. Bowel sounds are normal. He exhibits no distension. There is no tenderness.   Musculoskeletal: He exhibits edema. He exhibits no tenderness or deformity.   Skin: Skin is warm. He is not diaphoretic. No erythema. No pallor.       Significant Labs:  CBC:   Recent Labs   Lab  08/15/18   0453   WBC  3.03*   RBC  2.73*   HGB  9.0*   HCT  27.0*   PLT  103*   MCV  99*   MCH  33.0*   MCHC  33.3     CMP:   Recent Labs   Lab  08/15/18   0453   GLU  109   CALCIUM  8.8   ALBUMIN  2.7*   PROT  4.7*   NA  136   K  4.8   CO2  22*   CL  104   BUN  60*   CREATININE  3.4*   ALKPHOS  58   ALT  16   AST  13   BILITOT  0.5            Assessment/Plan:     Acute kidney injury superimposed on chronic kidney disease    ATN on CKD    Continues with good urine output, although not recorded  Still edematous LE b/l    -No need for RRT at this time  - Continue lasix at 80mg IV BID  - Strict I/Os, serial RFPs  - Avoid nephrotoxic medications  - Hb > 7gm/dL            Thank you for your consult. I will follow-up with patient. Please contact us if you have any additional questions.    Otoniel Velarde MD  Nephrology  [facility]    ATTENDING PHYSICIAN ATTESTATION  I have personally interviewed and examined the patient. I thoroughly reviewed the demographic, clinical, laboratorial and imaging information  available in medical records. I agree with the assessment and recommendations provided by the subspecialty resident. Dr. Velarde was under my supervision.

## 2018-08-15 NOTE — PT/OT/SLP PROGRESS
Occupational Therapy   Treatment/POC change    Name: Alon Adamson  MRN: [id number]  Admitting Diagnosis:  Liver transplanted  23 Days Post-Op    Recommendations:     Discharge Recommendations: Rehab  Discharge Equipment Recommendations:  (TBD)  Barriers to discharge:  Inaccessible home environment, Decreased caregiver support    Subjective     Communicated with: RN prior to session.  Pain/Comfort:  · Pain Rating 1: 0/10  · Pain Rating Post-Intervention 1: 0/10    Patients cultural, spiritual, Mormon conflicts given the current situation: none stated     Objective:     Patient found with: telemetry    General Precautions: Standard, fall   Orthopedic Precautions:N/A   Braces: N/A     Occupational Performance:    Bed Mobility:    · Patient completed Rolling/Turning to Right with supervision and with side rail  · Patient completed Supine to Sit with stand by assistance with side rail  · Patient completed Sit to Supine with stand by assistance with side rail     Functional Mobility/Transfers:  · Patient completed Sit <> Stand Transfer with moderate assistance  with  rolling walker    * Pt completed x4 trials from EOB ( height of bed raised)   · Functional Mobility: Pt took ~10 Lateral steps to right and left side using RW requiring CGA- minimal assistance for balance and safety, pt presents with sensation deficits in RLE requiring cues for correct placement.   ** Pt easily distracted, attempting to talk while ambulating requiring constant cues for redirection and to focus on task throughout session.    Sitting EOB:   Pt sat EOB ~30 minutes with supervision for static sitting/SBA for dynamic sitting    Activities of Daily Living:  · Feeding:  supervision with set-up assistance while seated in chair position in bed  · Upper Body Dressing: moderate assistance to fer gown like jacket while seated EOB ( limited shoulder flexion)   · Lower Body Dressing: total assistance to fer B socks while seated EOB    Patient  left with bed in chair position with all lines intact, call button in reach and RN notified    [facility] 6 Click:  [facility] Total Score: 14    Treatment & Education:  -Pt edu on OT role/POC  -Importance of OOB activity with staff assistance  -Safety during functional t/f and mobility  - White board updated  - Multiple self care tasks completed-- assistance level noted above   - All questions concerns answered within OT scope of practice  - Pt performed dynamic standing activity incorporating unilateral reaching, weight shifting, and core control to inmprove dynamic balance, strength, and endurance for increased independence with ADL tasks. * Pt tolerated standing for ~6 minutes using RW for support  Education:    Assessment:     Alon Adamson is a 63 y.o. male with a medical diagnosis of Liver transplanted.   Performance deficits affecting function are weakness, impaired endurance, impaired functional mobilty, gait instability, decreased upper extremity function, decreased ROM, impaired self care skills, decreased lower extremity function, impaired balance, decreased coordination.  Pt requires multiple cues throughout session to attend to task 2/2 pt continually conversing about various topics while performing activities/exercies. Pt demo's improve activity tolerance and progressing well with goals. Pt would benefit from rehab following d/c to continue to progress towards goals and improve quality of life.     Rehab Prognosis:  Good; patient would benefit from acute skilled OT services to address these deficits and reach maximum level of function.       Plan:     Patient to be seen 4 x/week to address the above listed problems via self-care/home management, therapeutic activities, therapeutic exercises, neuromuscular re-education  · Plan of Care Expires: 08/26/18  · Plan of Care Reviewed with: patient, mother    This Plan of care has been discussed with the patient who was involved in its development and understands  and is in agreement with the identified goals and treatment plan    GOALS:   Multidisciplinary Problems     Occupational Therapy Goals        Problem: Occupational Therapy Goal    Goal Priority Disciplines Outcome Interventions   Occupational Therapy Goal     OT, PT/OT Ongoing (interventions implemented as appropriate)    Description:  Goals to be met by: 8/27/18    Patient will increase functional independence with ADLs by performing:     Upper body dressing while seated EOB with SBA  Grooming while standing at sink with Stand-by Assistance  Toileting from bedside commode with Minimal Assistance for hygiene and clothing management.  Toilet transfer to bedside commode with Contact Guard Assistance.  Pt will complete a functional static standing activity x ~4 minutes with CGA.   LB Dressing with Moderate Assistance                             Time Tracking:     OT Date of Treatment: 08/15/18  OT Start Time: 0909  OT Stop Time: 0952  OT Total Time (min): 43 min    Billable Minutes:Self Care/Home Management 15  Therapeutic Exercise 15  Neuromuscular Re-education 13    Aida buckley OT  8/15/2018

## 2018-08-16 LAB
ALBUMIN SERPL BCP-MCNC: 2.5 G/DL
ALP SERPL-CCNC: 53 U/L
ALT SERPL W/O P-5'-P-CCNC: 13 U/L
ANION GAP SERPL CALC-SCNC: 7 MMOL/L
AST SERPL-CCNC: 10 U/L
BASOPHILS # BLD AUTO: 0.02 K/UL
BASOPHILS NFR BLD: 0.8 %
BILIRUB SERPL-MCNC: 0.5 MG/DL
BUN SERPL-MCNC: 62 MG/DL
CALCIUM SERPL-MCNC: 8.5 MG/DL
CHLORIDE SERPL-SCNC: 104 MMOL/L
CO2 SERPL-SCNC: 25 MMOL/L
CREAT SERPL-MCNC: 3.1 MG/DL
DIFFERENTIAL METHOD: ABNORMAL
EOSINOPHIL # BLD AUTO: 0.1 K/UL
EOSINOPHIL NFR BLD: 2.4 %
ERYTHROCYTE [DISTWIDTH] IN BLOOD BY AUTOMATED COUNT: 18.1 %
EST. GFR  (AFRICAN AMERICAN): 23.6 ML/MIN/1.73 M^2
EST. GFR  (NON AFRICAN AMERICAN): 20.5 ML/MIN/1.73 M^2
GLUCOSE SERPL-MCNC: 112 MG/DL
HCT VFR BLD AUTO: 25.8 %
HGB BLD-MCNC: 8.2 G/DL
IMM GRANULOCYTES # BLD AUTO: 0.02 K/UL
IMM GRANULOCYTES NFR BLD AUTO: 0.8 %
LYMPHOCYTES # BLD AUTO: 0.3 K/UL
LYMPHOCYTES NFR BLD: 13.6 %
MAGNESIUM SERPL-MCNC: 1.3 MG/DL
MCH RBC QN AUTO: 31.9 PG
MCHC RBC AUTO-ENTMCNC: 31.8 G/DL
MCV RBC AUTO: 100 FL
MONOCYTES # BLD AUTO: 0.4 K/UL
MONOCYTES NFR BLD: 14.4 %
NEUTROPHILS # BLD AUTO: 1.7 K/UL
NEUTROPHILS NFR BLD: 68 %
NRBC BLD-RTO: 0 /100 WBC
PHOSPHATE SERPL-MCNC: 4.6 MG/DL
PLATELET # BLD AUTO: 98 K/UL
PMV BLD AUTO: 9.9 FL
POCT GLUCOSE: 141 MG/DL (ref 70–110)
POCT GLUCOSE: 155 MG/DL (ref 70–110)
POCT GLUCOSE: 175 MG/DL (ref 70–110)
POCT GLUCOSE: 179 MG/DL (ref 70–110)
POCT GLUCOSE: 94 MG/DL (ref 70–110)
POTASSIUM SERPL-SCNC: 4.8 MMOL/L
PROT SERPL-MCNC: 4.5 G/DL
RBC # BLD AUTO: 2.57 M/UL
SODIUM SERPL-SCNC: 136 MMOL/L
TACROLIMUS BLD-MCNC: 7.3 NG/ML
WBC # BLD AUTO: 2.5 K/UL

## 2018-08-16 PROCEDURE — 25000003 PHARM REV CODE 250: Performed by: PHYSICIAN ASSISTANT

## 2018-08-16 PROCEDURE — 99233 SBSQ HOSP IP/OBS HIGH 50: CPT | Mod: 24,,, | Performed by: PHYSICIAN ASSISTANT

## 2018-08-16 PROCEDURE — 84100 ASSAY OF PHOSPHORUS: CPT

## 2018-08-16 PROCEDURE — 99900035 HC TECH TIME PER 15 MIN (STAT)

## 2018-08-16 PROCEDURE — 94664 DEMO&/EVAL PT USE INHALER: CPT

## 2018-08-16 PROCEDURE — 99231 SBSQ HOSP IP/OBS SF/LOW 25: CPT | Mod: ,,, | Performed by: NURSE PRACTITIONER

## 2018-08-16 PROCEDURE — 97110 THERAPEUTIC EXERCISES: CPT

## 2018-08-16 PROCEDURE — 99232 SBSQ HOSP IP/OBS MODERATE 35: CPT | Mod: ,,, | Performed by: NURSE PRACTITIONER

## 2018-08-16 PROCEDURE — 63600175 PHARM REV CODE 636 W HCPCS: Performed by: STUDENT IN AN ORGANIZED HEALTH CARE EDUCATION/TRAINING PROGRAM

## 2018-08-16 PROCEDURE — 63600175 PHARM REV CODE 636 W HCPCS: Performed by: PHYSICIAN ASSISTANT

## 2018-08-16 PROCEDURE — 25000003 PHARM REV CODE 250: Performed by: STUDENT IN AN ORGANIZED HEALTH CARE EDUCATION/TRAINING PROGRAM

## 2018-08-16 PROCEDURE — 25000003 PHARM REV CODE 250: Performed by: NURSE PRACTITIONER

## 2018-08-16 PROCEDURE — 97116 GAIT TRAINING THERAPY: CPT

## 2018-08-16 PROCEDURE — 83735 ASSAY OF MAGNESIUM: CPT

## 2018-08-16 PROCEDURE — 97112 NEUROMUSCULAR REEDUCATION: CPT

## 2018-08-16 PROCEDURE — 25000003 PHARM REV CODE 250: Performed by: SURGERY

## 2018-08-16 PROCEDURE — 85025 COMPLETE CBC W/AUTO DIFF WBC: CPT

## 2018-08-16 PROCEDURE — 97530 THERAPEUTIC ACTIVITIES: CPT

## 2018-08-16 PROCEDURE — 80053 COMPREHEN METABOLIC PANEL: CPT

## 2018-08-16 PROCEDURE — 80197 ASSAY OF TACROLIMUS: CPT

## 2018-08-16 PROCEDURE — 63600175 PHARM REV CODE 636 W HCPCS: Performed by: NURSE PRACTITIONER

## 2018-08-16 PROCEDURE — 20600001 HC STEP DOWN PRIVATE ROOM

## 2018-08-16 PROCEDURE — 36415 COLL VENOUS BLD VENIPUNCTURE: CPT

## 2018-08-16 PROCEDURE — 99232 SBSQ HOSP IP/OBS MODERATE 35: CPT | Mod: GC,,, | Performed by: INTERNAL MEDICINE

## 2018-08-16 RX ORDER — MYCOPHENOLATE MOFETIL 250 MG/1
250 CAPSULE ORAL 2 TIMES DAILY
Status: DISCONTINUED | OUTPATIENT
Start: 2018-08-16 | End: 2018-08-17 | Stop reason: HOSPADM

## 2018-08-16 RX ORDER — LANOLIN ALCOHOL/MO/W.PET/CERES
800 CREAM (GRAM) TOPICAL 3 TIMES DAILY
Status: DISCONTINUED | OUTPATIENT
Start: 2018-08-16 | End: 2018-08-17 | Stop reason: HOSPADM

## 2018-08-16 RX ORDER — ATOVAQUONE 750 MG/5ML
1500 SUSPENSION ORAL DAILY
Status: DISCONTINUED | OUTPATIENT
Start: 2018-08-16 | End: 2018-08-17 | Stop reason: HOSPADM

## 2018-08-16 RX ORDER — ONDANSETRON 4 MG/1
4 TABLET, ORALLY DISINTEGRATING ORAL DAILY
Status: DISCONTINUED | OUTPATIENT
Start: 2018-08-16 | End: 2018-08-17 | Stop reason: HOSPADM

## 2018-08-16 RX ADMIN — PREDNISONE 10 MG: 10 TABLET ORAL at 08:08

## 2018-08-16 RX ADMIN — HEPARIN SODIUM 5000 UNITS: 5000 INJECTION, SOLUTION INTRAVENOUS; SUBCUTANEOUS at 01:08

## 2018-08-16 RX ADMIN — ACYCLOVIR 400 MG: 200 CAPSULE ORAL at 08:08

## 2018-08-16 RX ADMIN — MICONAZOLE NITRATE: 20 OINTMENT TOPICAL at 08:08

## 2018-08-16 RX ADMIN — HEPARIN SODIUM 5000 UNITS: 5000 INJECTION, SOLUTION INTRAVENOUS; SUBCUTANEOUS at 08:08

## 2018-08-16 RX ADMIN — ASPIRIN 81 MG: 81 TABLET, COATED ORAL at 08:08

## 2018-08-16 RX ADMIN — FUROSEMIDE 80 MG: 10 INJECTION, SOLUTION INTRAMUSCULAR; INTRAVENOUS at 05:08

## 2018-08-16 RX ADMIN — OXYCODONE HYDROCHLORIDE 5 MG: 5 TABLET ORAL at 01:08

## 2018-08-16 RX ADMIN — ONDANSETRON 4 MG: 2 INJECTION INTRAMUSCULAR; INTRAVENOUS at 07:08

## 2018-08-16 RX ADMIN — FUROSEMIDE 80 MG: 10 INJECTION, SOLUTION INTRAMUSCULAR; INTRAVENOUS at 07:08

## 2018-08-16 RX ADMIN — FLUCONAZOLE 200 MG: 200 TABLET ORAL at 08:08

## 2018-08-16 RX ADMIN — PANTOPRAZOLE SODIUM 40 MG: 40 TABLET, DELAYED RELEASE ORAL at 08:08

## 2018-08-16 RX ADMIN — LEVETIRACETAM 500 MG: 500 TABLET ORAL at 08:08

## 2018-08-16 RX ADMIN — ATOVAQUONE 1500 MG: 750 SUSPENSION ORAL at 01:08

## 2018-08-16 RX ADMIN — MYCOPHENOLATE MOFETIL 250 MG: 250 CAPSULE ORAL at 08:08

## 2018-08-16 RX ADMIN — MAGNESIUM OXIDE TAB 400 MG (241.3 MG ELEMENTAL MG) 800 MG: 400 (241.3 MG) TAB at 08:08

## 2018-08-16 RX ADMIN — OXYCODONE HYDROCHLORIDE 5 MG: 5 TABLET ORAL at 05:08

## 2018-08-16 RX ADMIN — ERGOCALCIFEROL 50000 UNITS: 1.25 CAPSULE ORAL at 08:08

## 2018-08-16 RX ADMIN — HEPARIN SODIUM 5000 UNITS: 5000 INJECTION, SOLUTION INTRAVENOUS; SUBCUTANEOUS at 05:08

## 2018-08-16 RX ADMIN — MAGNESIUM OXIDE TAB 400 MG (241.3 MG ELEMENTAL MG) 800 MG: 400 (241.3 MG) TAB at 01:08

## 2018-08-16 RX ADMIN — SITAGLIPTIN 25 MG: 25 TABLET, FILM COATED ORAL at 08:08

## 2018-08-16 RX ADMIN — ATORVASTATIN CALCIUM 40 MG: 20 TABLET, FILM COATED ORAL at 08:08

## 2018-08-16 RX ADMIN — ERYTHROPOIETIN 10000 UNITS: 10000 INJECTION, SOLUTION INTRAVENOUS; SUBCUTANEOUS at 05:08

## 2018-08-16 RX ADMIN — ACETAMINOPHEN 650 MG: 325 TABLET, FILM COATED ORAL at 08:08

## 2018-08-16 RX ADMIN — SIMETHICONE CHEW TAB 80 MG 80 MG: 80 TABLET ORAL at 08:08

## 2018-08-16 RX ADMIN — SIMETHICONE CHEW TAB 80 MG 80 MG: 80 TABLET ORAL at 09:08

## 2018-08-16 RX ADMIN — TACROLIMUS 3 MG: 1 CAPSULE ORAL at 05:08

## 2018-08-16 RX ADMIN — TACROLIMUS 3 MG: 1 CAPSULE ORAL at 08:08

## 2018-08-16 NOTE — SUBJECTIVE & OBJECTIVE
Scheduled Meds:   acetaminophen  650 mg Oral Q12H    acyclovir  400 mg Oral BID    aspirin  81 mg Oral Daily    atorvastatin  40 mg Oral Daily    atovaquone  1,500 mg Oral Daily    bisacodyl  10 mg Rectal Daily    docusate sodium  100 mg Oral Daily    epoetin dread  10,000 Units Intravenous Every Tues, Thurs, Sat    ergocalciferol  50,000 Units Oral Q7 Days    furosemide  80 mg Intravenous BID    heparin (porcine)  5,000 Units Subcutaneous Q8H    levETIRAcetam  500 mg Oral BID    magnesium oxide  800 mg Oral TID    miconazole nitrate 2%   Topical (Top) BID    mycophenolate  250 mg Oral BID    ondansetron  4 mg Oral Daily    pantoprazole  40 mg Oral BID    predniSONE  10 mg Oral Daily    [START ON 8/19/2018] predniSONE  5 mg Oral Daily    SITagliptin  25 mg Oral Daily    tacrolimus  3 mg Oral BID     Continuous Infusions:  PRN Meds:acetaminophen, dextrose 50%, dextrose 50%, gelatin adsorbable 100cm top sponge, glucagon (human recombinant), glucose, glucose, insulin aspart U-100, ondansetron, oxyCODONE, ramelteon, simethicone, sodium chloride 0.9%    Review of Systems   Constitutional: Positive for activity change, appetite change and fatigue. Negative for chills, diaphoresis and fever.   HENT: Negative for congestion and facial swelling.    Eyes: Negative for pain, discharge and visual disturbance.   Respiratory: Negative for cough, chest tightness, shortness of breath and wheezing.    Cardiovascular: Positive for leg swelling. Negative for chest pain and palpitations.   Gastrointestinal: Positive for abdominal distention, nausea and vomiting. Negative for abdominal pain, constipation and diarrhea.   Endocrine: Negative.    Genitourinary: Negative for decreased urine volume.   Musculoskeletal: Negative for back pain.   Skin: Positive for wound.   Allergic/Immunologic: Positive for immunocompromised state.   Neurological: Positive for weakness. Negative for dizziness, tremors and headaches.    Hematological: Negative.    Psychiatric/Behavioral: Negative for agitation, dysphoric mood and sleep disturbance. The patient is not nervous/anxious.      Objective:     Vital Signs (Most Recent):  Temp: 98.1 °F (36.7 °C) (08/16/18 1534)  Pulse: 88 (08/16/18 1534)  Resp: 16 (08/16/18 1534)  BP: 121/60 (08/16/18 1534)  SpO2: (!) 94 % (08/16/18 1534) Vital Signs (24h Range):  Temp:  [98.1 °F (36.7 °C)-98.7 °F (37.1 °C)] 98.1 °F (36.7 °C)  Pulse:  [68-94] 88  Resp:  [16-18] 16  SpO2:  [94 %-98 %] 94 %  BP: (121-162)/(60-75) 121/60     Weight: 126.7 kg (279 lb 5.2 oz)  Body mass index is 35.86 kg/m².    Intake/Output - Last 3 Shifts       08/14 0700 - 08/15 0659 08/15 0700 - 08/16 0659 08/16 0700 - 08/17 0659    P.O. 1440 1900     I.V. (mL/kg) 0 (0) 0 (0)     Other 0 0     Total Intake(mL/kg) 1440 (11.4) 1900 (15)     Urine (mL/kg/hr) 0 (0) 0 (0)     Emesis/NG output 0 0     Other 0 0     Stool 0 0     Blood 0 0     Total Output 0 0     Net +1440 +1900            Urine Occurrence 10 x 8 x     Stool Occurrence 1 x 2 x     Emesis Occurrence 0 x 0 x           Physical Exam   Constitutional: He is oriented to person, place, and time. He appears well-developed. No distress.   Temporal and distal extremity muscle wasting   HENT:   Head: Normocephalic and atraumatic.   Eyes: EOM are normal. Pupils are equal, round, and reactive to light. No scleral icterus.   Neck: Normal range of motion.   Cardiovascular: Normal rate, regular rhythm and normal heart sounds.   Pulmonary/Chest: Effort normal. No respiratory distress. He has no wheezes.   Coarse BS   Abdominal: Soft. Bowel sounds are normal. He exhibits distension (ascites). There is no tenderness.   [place] cdi no ssi   Musculoskeletal: He exhibits edema (2+ BLE ).   Neurological: He is alert and oriented to person, place, and time.   Skin: Skin is warm and dry. Capillary refill takes 2 to 3 seconds. He is not diaphoretic.   Psychiatric: He has a normal mood and affect.  His behavior is normal. Judgment and thought content normal.   Nursing note and vitals reviewed.      Laboratory:  Immunosuppressants         Stop Route Frequency     mycophenolate capsule 250 mg      -- Oral 2 times daily     tacrolimus capsule 3 mg      -- Oral 2 times daily        CBC:   Recent Labs   Lab  08/16/18 0421   WBC  2.50*   RBC  2.57*   HGB  8.2*   HCT  25.8*   PLT  98*   MCV  100*   MCH  31.9*   MCHC  31.8*     CMP:   Recent Labs   Lab  08/16/18 0421   GLU  112*   CALCIUM  8.5*   ALBUMIN  2.5*   PROT  4.5*   NA  136   K  4.8   CO2  25   CL  104   BUN  62*   CREATININE  3.1*   ALKPHOS  53*   ALT  13   AST  10   BILITOT  0.5     Tacrolimus Lvl   Date Value Ref Range Status   08/16/2018 7.3 5.0 - 15.0 ng/mL Final     Comment:     Testing performed by Liquid Chromatography-Tandem  Mass Spectrometry (LC-MS/MS).  This test was developed and its performance characteristics  determined by [facility], Department of Pathology  and Laboratory Medicine in a manner consistent with CLIA  requirements. It has not been cleared or approved by the [place]  Food and Drug Administration.  This test is used for clinical   purposes.  It should not be regarded as investigational or for  research.     08/15/2018 9.1 5.0 - 15.0 ng/mL Final     Comment:     Testing performed by Liquid Chromatography-Tandem  Mass Spectrometry (LC-MS/MS).  This test was developed and its performance characteristics  determined by [facility], Department of Pathology  and Laboratory Medicine in a manner consistent with CLIA  requirements. It has not been cleared or approved by the [place]  Food and Drug Administration.  This test is used for clinical   purposes.  It should not be regarded as investigational or for  research.     08/14/2018 7.0 5.0 - 15.0 ng/mL Final     Comment:     Testing performed by Liquid Chromatography-Tandem  Mass Spectrometry (LC-MS/MS).  This test was developed and its performance  characteristics  determined by [facility], Department of Pathology  and Laboratory Medicine in a manner consistent with CLIA  requirements. It has not been cleared or approved by the [place]  Food and Drug Administration.  This test is used for clinical   purposes.  It should not be regarded as investigational or for  research.     08/13/2018 6.6 5.0 - 15.0 ng/mL Final     Comment:     Testing performed by Liquid Chromatography-Tandem  Mass Spectrometry (LC-MS/MS).  This test was developed and its performance characteristics  determined by [facility], Department of Pathology  and Laboratory Medicine in a manner consistent with CLIA  requirements. It has not been cleared or approved by the [place]  Food and Drug Administration.  This test is used for clinical   purposes.  It should not be regarded as investigational or for  research.         Labs within the past 24 hours have been reviewed.    Diagnostic Results:  I have personally reviewed all pertinent imaging studies.

## 2018-08-16 NOTE — ASSESSMENT & PLAN NOTE
ATN on CKD    Continues with good urine output, although not recorded  Still edematous LE b/l    -No need for RRT at this time  -Continue lasix at 80mg IV BID  -Rec low sodium diet (2g)  - Strict I/Os, serial RFPs  - Avoid nephrotoxic medications  - Hb > 7gm/dL

## 2018-08-16 NOTE — PROGRESS NOTES
LEAH met with patient and pts mom Itzel at bedside today to discuss dc planning for tomorrow.   Pt is eager to transfer tomorrow to inpatient rehab and is happy for the opportunity.   Pt and pts mom in agreement with dc plans.  LEAH explained that we will have patient transferred via wheelchair van tomorrow to the [facility] and the team will have his bed information tomorrow.   SW able to answer questions about food for visitors, there is a cafeteria on site, as well as washing patient's clothes, they have washer/dryer on site.   LEAH was able to speak with patient's wife today and provide education about inpatient rehab and communication with rehab team and pts wife in regards to dc planning from rehab, including appts w/transplatn team day of dc from inpt rehab and coordinating apartment assignment at [facility].   Pts wife verbalized understanding.  LEAH Remains available for all transplant resources, education, psychosocial support and assist with all dc planning needs.

## 2018-08-16 NOTE — PT/OT/SLP PROGRESS
Occupational Therapy   Treatment    Name: Alon Adamson  MRN: [id number]  Admitting Diagnosis:  Liver transplanted  24 Days Post-Op    Recommendations:     Discharge Recommendations: rehabilitation facility  Discharge Equipment Recommendations:  (TBD at next level of care   Barriers to discharge:  Inaccessible home environment, Decreased caregiver support    Subjective     Communicated with: RN prior to session.  Pt agreeable to participate with therapy. Mother present throughout treatment session.     Pain/Comfort:  · Pain Rating 1: (L shoulder pain-- did not rate Simultaneous filing. User may not have seen previous data.)    Patients cultural, spiritual, Syn[employer] conflicts given the current situation: none stated     Objective:     Patient found with: telemetry  General Precautions: Standard, fall  Orthopedic Precautions:N/A  Braces: N/A  Occupational Performance:    Bed Mobility:    · Patient completed Rolling/Turning to Right with modified independence  · Patient completed Supine to Sit with stand by assistance and with side rail  · Patient completed Sit to Supine with stand by assistance     Functional Mobility/Transfers:  · Patient completed Sit <> Stand Transfer with minimum assistance x2 for first x4 trials ( bed heightened) ; required mod A x2 5th trials with bed in lowered position  · Functional Mobility: Pt took ~20 steps to right/left side at EOB requiring CGA and blocking of R knee using RW for safety.   · Pt completed weight shifting to right/left side while standing x2 trials for ~2 minutes using RW and CGA for balance and safety.     Activities of Daily Living:  · Upper Body Dressing: moderate assistance to fer gown like jacket while seated EOB ( limited shoulder ROM)    Patient left right sidelying with all lines intact, call button in reach and RN notified    [facility] 6 Click:  [facility] Total Score: 14    Treatment & Education:  -Pt edu on OT role/POC  -Importance of OOB activity with staff  assistance  -Safety during functional t/f and mobility  - White board updated  - Multiple self care tasks completed-- assistance level noted above  - All questions/concerns answered within OT scope of practice    Education:    Assessment:     Alon Adamson is a 63 y.o. male with a medical diagnosis of Liver transplanted.  He presents with improved activity tolerance and assistance level with functional mobility this date. He is progressing well with goals.  Performance deficits affecting function are impaired sensation, impaired endurance, weakness, impaired self care skills, impaired functional mobilty, gait instability, impaired balance, decreased upper extremity function, decreased lower extremity function, decreased safety awareness, decreased ROM, impaired joint extensibility. Pt would benefit from rehab following d/c to continue to progress towards goals and improve quality of life.     Rehab Prognosis:  Good; patient would benefit from acute skilled OT services to address these deficits and reach maximum level of function.       Plan:     Patient to be seen 4 x/week to address the above listed problems via self-care/home management, therapeutic activities, therapeutic exercises, neuromuscular re-education  · Plan of Care Expires: 08/26/18  · Plan of Care Reviewed with: patient, mother(Simultaneous filing. User may not have seen previous data.)    This Plan of care has been discussed with the patient who was involved in its development and understands and is in agreement with the identified goals and treatment plan    GOALS:   Multidisciplinary Problems     Occupational Therapy Goals        Problem: Occupational Therapy Goal    Goal Priority Disciplines Outcome Interventions   Occupational Therapy Goal     OT, PT/OT Ongoing (interventions implemented as appropriate)    Description:  Goals to be met by: 8/27/18    Patient will increase functional independence with ADLs by performing:     Upper body  dressing while seated EOB with SBA  Grooming while standing at sink with Stand-by Assistance  Toileting from bedside commode with Minimal Assistance for hygiene and clothing management.  Toilet transfer to bedside commode with Contact Guard Assistance.  Pt will complete a functional static standing activity x ~4 minutes with CGA.   LB Dressing with Moderate Assistance                             Time Tracking:     OT Date of Treatment: 08/16/18  OT Start Time: 1453  OT Stop Time: 1530  OT Total Time (min): 37 min    Billable Minutes:Therapeutic Activity 15  Therapeutic Exercise 22    Aida buckley, MARA  8/16/2018

## 2018-08-16 NOTE — ASSESSMENT & PLAN NOTE
- With CKD3.  - Expected VICKY post txp.   - UOP unclear as pt unable to measure urine (severe scrotal edema- improving) though feels UOP improving.  - Tolerated HD 8/1 and 8/3.   - Nephrology following and HD per their recs.   - Lasix 120 mg given 8/9/18.    - Cr trended 4 ->3.8-> 3.6-> 3.4 ->3.4 -> 3.1.    - Continue Lasix 80 mg bid- transition to oral tomorrow

## 2018-08-16 NOTE — ASSESSMENT & PLAN NOTE
No previous history of DM  BG goal 140-180.     Start januvia 25 mg daily.  BG monitoring ac/hs and low dose correction scale.     Discharge:  TBD.

## 2018-08-16 NOTE — SUBJECTIVE & OBJECTIVE
Interval History: No acute events overnight. Patient with continued good uop, although not currently able to record. Interval decrease in Cr 3.4-->3.1.     Review of patient's allergies indicates:   Allergen Reactions    Nsaids (non-steroidal anti-inflammatory drug)      D/t to liver disease.    Penicillins Other (See Comments)     Tolerated pip-tazo in 8/2016 without issue  Tolerated cefepime 7/2018 without issue  Since childhood was told not to take it     Current Facility-Administered Medications   Medication Frequency    acetaminophen tablet 500 mg Q6H PRN    acetaminophen tablet 650 mg Q12H    acyclovir capsule 400 mg BID    aspirin EC tablet 81 mg Daily    atorvastatin tablet 40 mg Daily    atovaquone suspension 1,500 mg Daily    bisacodyl suppository 10 mg Daily    dextrose 50% injection 12.5 g PRN    dextrose 50% injection 25 g PRN    docusate sodium capsule 100 mg Daily    epoetin dread injection 10,000 Units Every Tues, Thurs, Sat    ergocalciferol capsule 50,000 Units Q7 Days    fluconazole tablet 200 mg Daily    furosemide injection 80 mg BID    gelatin adsorbable 100cm top sponge 100 sponge 1 applicator PRN    glucagon (human recombinant) injection 1 mg PRN    glucose chewable tablet 16 g PRN    glucose chewable tablet 24 g PRN    heparin (porcine) injection 5,000 Units Q8H    insulin aspart U-100 pen 0-5 Units QID (AC + HS) PRN    levETIRAcetam tablet 500 mg BID    magnesium oxide tablet 800 mg TID    miconazole nitrate 2% ointment BID    mycophenolate capsule 250 mg BID    ondansetron disintegrating tablet 4 mg Daily    ondansetron injection 4 mg Q8H PRN    oxyCODONE immediate release tablet 5 mg Q4H PRN    pantoprazole EC tablet 40 mg BID    predniSONE tablet 10 mg Daily    [START ON 8/19/2018] predniSONE tablet 5 mg Daily    ramelteon tablet 8 mg Nightly PRN    simethicone chewable tablet 80 mg TID PRN    SITagliptin tablet 25 mg Daily    sodium chloride 0.9% flush  3 mL PRN    tacrolimus capsule 3 mg BID       Objective:     Vital Signs (Most Recent):  Temp: 98.5 °F (36.9 °C) (08/16/18 1156)  Pulse: 79 (08/16/18 1156)  Resp: 18 (08/16/18 1156)  BP: (!) 155/72 (08/16/18 1156)  SpO2: 95 % (08/16/18 1156)  O2 Device (Oxygen Therapy): room air (08/16/18 0735) Vital Signs (24h Range):  Temp:  [98.1 °F (36.7 °C)-98.7 °F (37.1 °C)] 98.5 °F (36.9 °C)  Pulse:  [68-94] 79  Resp:  [16-18] 18  SpO2:  [95 %-98 %] 95 %  BP: (129-162)/(60-75) 155/72     Weight: 126.7 kg (279 lb 5.2 oz) (08/16/18 0300)  Body mass index is 35.86 kg/m².  Body surface area is 2.57 meters squared.    I/O last 3 completed shifts:  In: 2620 [P.O.:2620]  Out: 0     Physical Exam   Constitutional: He appears well-developed and well-nourished. No distress.   HENT:   Head: Normocephalic and atraumatic.   Eyes: Conjunctivae are normal. No scleral icterus.   Neck: Normal range of motion. Neck supple.   Cardiovascular: Normal rate, regular rhythm and normal heart sounds. Exam reveals no gallop and no friction rub.   No murmur heard.  Pulmonary/Chest: Breath sounds normal. No respiratory distress. He has no rales.   Abdominal: Soft. Bowel sounds are normal. He exhibits no distension. There is no tenderness.   Musculoskeletal: Normal range of motion. He exhibits edema. He exhibits no tenderness.   Skin: Skin is warm and dry. No rash noted. He is not diaphoretic. No erythema. No pallor.       Significant Labs:  CBC:   Recent Labs   Lab  08/16/18 0421   WBC  2.50*   RBC  2.57*   HGB  8.2*   HCT  25.8*   PLT  98*   MCV  100*   MCH  31.9*   MCHC  31.8*     CMP:   Recent Labs   Lab  08/16/18   0421   GLU  112*   CALCIUM  8.5*   ALBUMIN  2.5*   PROT  4.5*   NA  136   K  4.8   CO2  25   CL  104   BUN  62*   CREATININE  3.1*   ALKPHOS  53*   ALT  13   AST  10   BILITOT  0.5

## 2018-08-16 NOTE — PLAN OF CARE
Problem: Occupational Therapy Goal  Goal: Occupational Therapy Goal  Goals to be met by: 8/27/18    Patient will increase functional independence with ADLs by performing:     Upper body dressing while seated EOB with SBA  Grooming while standing at sink with Stand-by Assistance  Toileting from bedside commode with Minimal Assistance for hygiene and clothing management.  Toilet transfer to bedside commode with Contact Guard Assistance.  Pt will complete a functional static standing activity x ~4 minutes with CGA.   LB Dressing with Moderate Assistance            Outcome: Ongoing (interventions implemented as appropriate)  Continue with POC.   Aida buckley OT  8/16/2018

## 2018-08-16 NOTE — PROGRESS NOTES
[facility]  Liver Transplant  Progress Note    Patient Name: Alon Adamson  MRN: [id number]  Admission Date: 2018  Hospital Length of Stay: 55 days  Code Status: Full Code  Primary Care Provider: Mckinley Vides MD  Post-Operative Day: 24    ORGAN:   LIVER  Disease Etiology: Alcoholic Cirrhosis  Donor Type:    - Brain Death  CDC High Risk:   No  Donor CMV Status:   Donor CMV Status: Negative  Donor HBcAB:   Negative  Donor HCV Status:   Negative  Donor HBV TAMAR: Negative  Donor HCV TAMAR: Negative  Whole or Partial: Whole Liver  Biliary Anastomosis: End to End  Arterial Anatomy: Standard  Subjective:     History of Present Illness:  Mr. Adamson is a 64 yo M with PMH ESLD secondary to ETOH cirrhosis, CKD3, CAD.  Complications of liver disease include hyperbilirubinemia, hypoalbuminemia, severe malnutrition,   hepatic encephalopathy, thrombocytopenia, splenomegaly, ascites, hypervolemia, coagulopathy, portal HTN.  Pt admitted  for acutely worsening hepatic encephalopathy and concern for BLE cellulitis as well as severe volume overload.  Complications of ELSD managed while inpt and he received liver offer on 18.  Pt underwent liver transplant without complication: steroid induction, DBD, CMV -/-.  Donor with E coli bacteremia resistant to cipro placed on Rocephin x 2 weeks per ID recs.  Also on fluconazole x 30 days as pt very ill prior to transplant. Post transplant, with nice trend down of AST/ALT and LFTs.  Liver U/S with elevated RIs and decreased HAV.  During initial post operative period, pt required pressor support.  Pt with expected post operative VICKY as with VICKY prior to surgery.  Nephrology consulted and pt placed on intermittent CRRT.  Pressor requirement decreased and pt tolerated CRRT without pressors.  Pt with asymptomatic bradycardia while in ICU prompting cardiology consult. Pt without need for acute intervention but atropine placed at bedside.  HR has improved POD  7 and 8.  He was transferred to the TSU 7/30 PM.          Liver US 7/31 showed small fluid collection likely an adrenal hemorrhage vs hematoma. H&H decreased to 6.6/20.3 8/1- responded to 1 u PRBCs. H/H stable 8/2 AM. Dropped again 8/3 AM- 1 u PRBC given. H&H now stable. Iron studies checked and pt started on epo.     Hospital Course:  Interval history: no acute events overnight. Nausea again this am - scheduled protonix and zofran.   Right trialysis cath removed 8/14.  Cont to diurese w lasix 80 mg bid today- transition to oral lasix tomorrow.  Cr 3.1 today.  Pt still unable to measure urine.  Weight cont to decrease. LFTs remain wnl.  Patient working w PT/OT.  He is able to sit up at bedside w/o assist.  Patient was hyperkalemic over the wkd- Bactrim was held.  G6PD w normal activity.  Rehab consulted - pt can go tomorrow.    Scheduled Meds:   acetaminophen  650 mg Oral Q12H    acyclovir  400 mg Oral BID    aspirin  81 mg Oral Daily    atorvastatin  40 mg Oral Daily    atovaquone  1,500 mg Oral Daily    bisacodyl  10 mg Rectal Daily    docusate sodium  100 mg Oral Daily    epoetin dread  10,000 Units Intravenous Every Tues, Thurs, Sat    ergocalciferol  50,000 Units Oral Q7 Days    furosemide  80 mg Intravenous BID    heparin (porcine)  5,000 Units Subcutaneous Q8H    levETIRAcetam  500 mg Oral BID    magnesium oxide  800 mg Oral TID    miconazole nitrate 2%   Topical (Top) BID    mycophenolate  250 mg Oral BID    ondansetron  4 mg Oral Daily    pantoprazole  40 mg Oral BID    predniSONE  10 mg Oral Daily    [START ON 8/19/2018] predniSONE  5 mg Oral Daily    SITagliptin  25 mg Oral Daily    tacrolimus  3 mg Oral BID     Continuous Infusions:  PRN Meds:acetaminophen, dextrose 50%, dextrose 50%, gelatin adsorbable 100cm top sponge, glucagon (human recombinant), glucose, glucose, insulin aspart U-100, ondansetron, oxyCODONE, ramelteon, simethicone, sodium chloride 0.9%    Review of Systems    Constitutional: Positive for activity change, appetite change and fatigue. Negative for chills, diaphoresis and fever.   HENT: Negative for congestion and facial swelling.    Eyes: Negative for pain, discharge and visual disturbance.   Respiratory: Negative for cough, chest tightness, shortness of breath and wheezing.    Cardiovascular: Positive for leg swelling. Negative for chest pain and palpitations.   Gastrointestinal: Positive for abdominal distention, nausea and vomiting. Negative for abdominal pain, constipation and diarrhea.   Endocrine: Negative.    Genitourinary: Negative for decreased urine volume.   Musculoskeletal: Negative for back pain.   Skin: Positive for wound.   Allergic/Immunologic: Positive for immunocompromised state.   Neurological: Positive for weakness. Negative for dizziness, tremors and headaches.   Hematological: Negative.    Psychiatric/Behavioral: Negative for agitation, dysphoric mood and sleep disturbance. The patient is not nervous/anxious.      Objective:     Vital Signs (Most Recent):  Temp: 98.1 °F (36.7 °C) (08/16/18 1534)  Pulse: 88 (08/16/18 1534)  Resp: 16 (08/16/18 1534)  BP: 121/60 (08/16/18 1534)  SpO2: (!) 94 % (08/16/18 1534) Vital Signs (24h Range):  Temp:  [98.1 °F (36.7 °C)-98.7 °F (37.1 °C)] 98.1 °F (36.7 °C)  Pulse:  [68-94] 88  Resp:  [16-18] 16  SpO2:  [94 %-98 %] 94 %  BP: (121-162)/(60-75) 121/60     Weight: 126.7 kg (279 lb 5.2 oz)  Body mass index is 35.86 kg/m².    Intake/Output - Last 3 Shifts       08/14 0700 - 08/15 0659 08/15 0700 - 08/16 0659 08/16 0700 - 08/17 0659    P.O. 1440 1900     I.V. (mL/kg) 0 (0) 0 (0)     Other 0 0     Total Intake(mL/kg) 1440 (11.4) 1900 (15)     Urine (mL/kg/hr) 0 (0) 0 (0)     Emesis/NG output 0 0     Other 0 0     Stool 0 0     Blood 0 0     Total Output 0 0     Net +1440 +1900            Urine Occurrence 10 x 8 x     Stool Occurrence 1 x 2 x     Emesis Occurrence 0 x 0 x           Physical Exam   Constitutional: He is  oriented to person, place, and time. He appears well-developed. No distress.   Temporal and distal extremity muscle wasting   HENT:   Head: Normocephalic and atraumatic.   Eyes: EOM are normal. Pupils are equal, round, and reactive to light. No scleral icterus.   Neck: Normal range of motion.   Cardiovascular: Normal rate, regular rhythm and normal heart sounds.   Pulmonary/Chest: Effort normal. No respiratory distress. He has no wheezes.   Coarse BS   Abdominal: Soft. Bowel sounds are normal. He exhibits distension (ascites). There is no tenderness.   Chevron inc cdi no ssi   Musculoskeletal: He exhibits edema (2+ BLE ).   Neurological: He is alert and oriented to person, place, and time.   Skin: Skin is warm and dry. Capillary refill takes 2 to 3 seconds. He is not diaphoretic.   Psychiatric: He has a normal mood and affect. His behavior is normal. Judgment and thought content normal.   Nursing note and vitals reviewed.      Laboratory:  Immunosuppressants         Stop Route Frequency     mycophenolate capsule 250 mg      -- Oral 2 times daily     tacrolimus capsule 3 mg      -- Oral 2 times daily        CBC:   Recent Labs   Lab  08/16/18 0421   WBC  2.50*   RBC  2.57*   HGB  8.2*   HCT  25.8*   PLT  98*   MCV  100*   MCH  31.9*   MCHC  31.8*     CMP:   Recent Labs   Lab  08/16/18 0421   GLU  112*   CALCIUM  8.5*   ALBUMIN  2.5*   PROT  4.5*   NA  136   K  4.8   CO2  25   CL  104   BUN  62*   CREATININE  3.1*   ALKPHOS  53*   ALT  13   AST  10   BILITOT  0.5     Tacrolimus Lvl   Date Value Ref Range Status   08/16/2018 7.3 5.0 - 15.0 ng/mL Final     Comment:     Testing performed by Liquid Chromatography-Tandem  Mass Spectrometry (LC-MS/MS).  This test was developed and its performance characteristics  determined by [facility], Department of Pathology  and Laboratory Medicine in a manner consistent with CLIA  requirements. It has not been cleared or approved by the [place]  Food and Drug Administration.   This test is used for clinical   purposes.  It should not be regarded as investigational or for  research.     08/15/2018 9.1 5.0 - 15.0 ng/mL Final     Comment:     Testing performed by Liquid Chromatography-Tandem  Mass Spectrometry (LC-MS/MS).  This test was developed and its performance characteristics  determined by [facility], Department of Pathology  and Laboratory Medicine in a manner consistent with CLIA  requirements. It has not been cleared or approved by the [place]  Food and Drug Administration.  This test is used for clinical   purposes.  It should not be regarded as investigational or for  research.     08/14/2018 7.0 5.0 - 15.0 ng/mL Final     Comment:     Testing performed by Liquid Chromatography-Tandem  Mass Spectrometry (LC-MS/MS).  This test was developed and its performance characteristics  determined by [facility], Department of Pathology  and Laboratory Medicine in a manner consistent with CLIA  requirements. It has not been cleared or approved by the [place]  Food and Drug Administration.  This test is used for clinical   purposes.  It should not be regarded as investigational or for  research.     08/13/2018 6.6 5.0 - 15.0 ng/mL Final     Comment:     Testing performed by Liquid Chromatography-Tandem  Mass Spectrometry (LC-MS/MS).  This test was developed and its performance characteristics  determined by [facility], Department of Pathology  and Laboratory Medicine in a manner consistent with CLIA  requirements. It has not been cleared or approved by the [place]  Food and Drug Administration.  This test is used for clinical   purposes.  It should not be regarded as investigational or for  research.         Labs within the past 24 hours have been reviewed.    Diagnostic Results:  I have personally reviewed all pertinent imaging studies.    Assessment/Plan:     * Liver transplanted    - LFTs/enzymes normalized.  - POD 1 US with elevated RIs.  - Repeat liver U/S with  stable, hyperechoic liver lesion, fluid collection inferior smaller, RIs persistently mildly elevated and mild to mod ascites.  Monitor.          Neutropenia    - Decreased Cellcept 500 mg 8/14.     - Monitor.          Generalized edema    - cont to have significant scrotal edema and abdominal swelling.  Nephrology following.  Last HD 8/3/18.  Received Lasix 120 mg on 8/9.  Pt unable to use urinal to measure output due to edema.     - Cont Lasix 80 mg bid ordered per Nephrology recs.  - Cr trending down.  BUN w sl trend up. Monitor.           Long-term use of immunosuppressant medication    - see prophylactic immuno.           Prophylactic immunotherapy    - Continue Prograf, Cellcept (half dose) and prednisone taper.   - Will monitor for signs of toxic side effects, check daily Prograf troughs, and change meds accordingly.          At risk for opportunistic infections    - Opportunistic infection prophylaxis will include Acyclovir for 3 months (CMV D- R-), Bactrim for 6 months, and Fluconazole x 30 days.          Steroid-induced hyperglycemia    - Endocrine consulted.          CKD (chronic kidney disease), stage III    - See VICKY.  Slowly improving.          Stage II pressure ulcer of sacral region    - Wound care following.          Severe protein-calorie malnutrition    - Dietary consulted.  - Tolerating 2-3 supplements daily.  - Pt eating well.        Physical deconditioning    - Improving with PT/OT.  - SNF consulted 8/14/18 and not likely to take patient  - Rehab consulted 8/15- pt is a candidate and can likely transfer 8/17        Alteration in skin integrity    - Needs aggressive therapy and OOB.  - wd care changing dressing to legs bi-weekly.            Acute kidney injury superimposed on chronic kidney disease    - With CKD3.  - Expected VICKY post txp.   - UOP unclear as pt unable to measure urine (severe scrotal edema- improving) though feels UOP improving.  - Tolerated HD 8/1 and 8/3.   - Nephrology  following and HD per their recs.   - Lasix 120 mg given 8/9/18.    - Cr trended 4 ->3.8-> 3.6-> 3.4 ->3.4 -> 3.1.    - Continue Lasix 80 mg bid- transition to oral tomorrow        Coronary artery disease involving native coronary artery of native heart without angina pectoris    - 2D Echo 7/9 normal EF (55-60%), trivial tricuspid/mitral regurg.  - Will need to assess when to resume home ASA.         Anemia of chronic disease    - Post transplant required blood transfusion 8/1 for H&H 6.6/20.3; 8/2 for H&H 6.4/19.7.  - Epo started on 8/3.  - PRBC 1 unit last transfused 8/5.  - H&H remains stable.        Thrombocytopenia    - Secondary to splenomegaly from portal HTN- improving post transplant.        Hypoalbuminemia due to protein-calorie malnutrition    - appetite improved. He is drinking 2-3 supplements daily.  Pt states he is personally trying to increase protein in diet.            Seizures    - Continue oral Keppra.  - Keep Mg > 2.  On PO Mag.  - Mag Ox increased to 800 mg bid x 2 doses.         Bilateral edema of lower extremity    - Was on intermittent dialysis prior to transplant  - Tolerated HD well 8/1. Last HD 8/3.   - BLE wrapped by wound care- last changed 8/14.  Significant improvement.              VTE Risk Mitigation (From admission, onward)        Ordered     heparin (porcine) injection 5,000 Units  Every 8 hours      07/26/18 1330     Place sequential compression device  Until discontinued      07/23/18 1326     IP VTE HIGH RISK PATIENT  Once      07/23/18 1242          The patients clinical status was discussed at multidisplinary rounds, involving transplant surgery, transplant medicine, pharmacy, nursing, nutrition, and social work    Discharge Planning:  No Patient Care Coordination Note on file.      Bebeto Virgen PA-C  Liver Transplant  [facility]

## 2018-08-16 NOTE — ASSESSMENT & PLAN NOTE
Avoid insulin stacking and hypoglycemia.  Lab Results   Component Value Date    CREATININE 3.1 (H) 08/16/2018

## 2018-08-16 NOTE — PT/OT/SLP PROGRESS
Physical Therapy Treatment    Patient Name:  Alon Adamson   MRN:  [id number]    Recommendations:     Discharge Recommendations:  rehabilitation facility   Discharge Equipment Recommendations: (TBD)   Barriers to discharge: Decreased caregiver support    Assessment:     Alon Adamson is a 63 y.o. male admitted with a medical diagnosis of Liver transplanted.  He presents with the following impairments/functional limitations:  weakness, impaired functional mobilty, decreased safety awareness, impaired endurance, gait instability, decreased lower extremity function, impaired self care skills, impaired balance, decreased upper extremity function, impaired skin, edema, decreased coordination. Pt able to perform bed mobility with supervision. However, pt continues to require assist of 2 for transfers and standing tasks 2* decreased balance and decreased LE functional strength. Pt would continue to benefit from skilled acute PT in order to address current deficits and progress functional mobility.     Rehab Prognosis:  good; patient would benefit from acute skilled PT services to address these deficits and reach maximum level of function.      Recent Surgery: Procedure(s) (LRB):  TRANSPLANT, LIVER (N/A) 24 Days Post-Op    Plan:     During this hospitalization, patient to be seen 4 x/week to address the above listed problems via gait training, therapeutic activities, therapeutic exercises, neuromuscular re-education  · Plan of Care Expires:  08/26/18   Plan of Care Reviewed with: patient, mother    Subjective     Communicated with RN prior to session.  Patient found supine upon PT entry to room, agreeable to treatment.      Chief Complaint: intermittent R medial thigh pain   Patient comments/goals: Pt motivated to go to IP rehab in order to continue to progress mobility.   Pain/Comfort:  · Pain Rating 1: 0/10    Patients cultural, spiritual, Druze conflicts given the current situation: none noted      Objective:     Patient found with: telemetry     General Precautions: Standard, fall   Orthopedic Precautions:N/A   Braces: N/A     Functional Mobility:  · Bed Mobility:     · Supine to Sit: supervision  · Sit to Supine: supervision  · Transfers:     · Sit to Stand:  minimum assistance and of 2 persons with rolling walker x4 reps from elevated bed height; modAx2 with RW x1 rep from lower bed height (still elevated)  · Required B knees blocked throughout transfer to assist with achieving full knee ext. Attempted to perform x1 rep without B knees blocked with pt unable to achieve full stand   · Cues for hand placement, anterior weight-shift, and use of forward momentum   · Gait: ~5 ft. x2, lateral steps along EOB with RW and CGA (assist of 2 for safety and t/c)  · Max cues for sequencing of steps throughout; t/c for R quad activation       AM-PAC 6 CLICK MOBILITY  Turning over in bed (including adjusting bedclothes, sheets and blankets)?: 3  Sitting down on and standing up from a chair with arms (e.g., wheelchair, bedside commode, etc.): 2  Moving from lying on back to sitting on the side of the bed?: 3  Moving to and from a bed to a chair (including a wheelchair)?: 2  Need to walk in hospital room?: 1  Climbing 3-5 steps with a railing?: 1  Basic Mobility Total Score: 12       Therapeutic Activities and Exercises:  Provided cues throughout session for attention to task/focus on task at hand 2* pt demo'ing inattention and tangential conversations during session. Re-direction to task provided throughout session.   Pt and pt's mother educated on: safety with mobility and progression of mobility and activities as appropriate.     Performed standing x5 trials with RW and CGA (assist of 2 for safety). Cues for upright postural control.   -Performed medial-lateral weight-shifts in B directions x3 trials with focus on weight-shifting to each LE in preparation for gait     Patient left supine with all lines intact, call  button in reach and pt's mother and PCT present..    GOALS:   Multidisciplinary Problems     Physical Therapy Goals        Problem: Physical Therapy Goal    Goal Priority Disciplines Outcome Goal Variances Interventions   Physical Therapy Goal     PT, PT/OT Ongoing (interventions implemented as appropriate)     Description:  Goals to be met by: 2018    Patient will increase functional independence with mobility by performin. Supine to sit with moderate assistance -  Met  Updated: Supine to sit with Supervision - met  Updated: Supine to sit with Mod I  2. Sit to stand with moderate assistance - met (2018)  Updated: Sit to stand with minimum assistance using rolling walker  3. Gait x 50ft with minimal assistance with RW. - not met  4. Pt will be able to perform hip flexion, hip abduction, and heel raises while standing with use of rolling walker (denoting improvement in balance)- Min A. Not met                                   Time Tracking:     PT Received On: 18  PT Start Time: 1455     PT Stop Time: 1530  PT Total Time (min): 35 min     Billable Minutes: Gait Training 8 and Neuromuscular Re-education 15   (co-tx with OT)    Treatment Type: Treatment  PT/PTA: PT     PTA Visit Number: 0     Lucille Craig, PT, DPT   2018  [phone]

## 2018-08-16 NOTE — ASSESSMENT & PLAN NOTE
-Nephrology following  -currently BUN 60/Cr. 3.4   -access d/c'd   -on a steroid taper with unknown end date   -on Lasix 80 mg IVP BID

## 2018-08-16 NOTE — SUBJECTIVE & OBJECTIVE
"Interval HPI:   Overnight events: remains in TSU. BG AT goal overnight. Not requiring correction scale. Prednisone 10 mg daily. Awaiting transfer to rehab; tentative transfer tomorrow.   Eatin-100%  Nausea: No  Hypoglycemia and intervention: No  Fever: No  TPN and/or TF: No    BP (!) 162/75 (BP Location: Left arm, Patient Position: Sitting)   Pulse 77   Temp 98.1 °F (36.7 °C) (Oral)   Resp 16   Ht 6' 2" (1.88 m)   Wt 126.7 kg (279 lb 5.2 oz)   SpO2 95%   BMI 35.86 kg/m²     Labs Reviewed and Include    Recent Labs   Lab  18   0421   GLU  112*   CALCIUM  8.5*   ALBUMIN  2.5*   PROT  4.5*   NA  136   K  4.8   CO2  25   CL  104   BUN  62*   CREATININE  3.1*   ALKPHOS  53*   ALT  13   AST  10   BILITOT  0.5     Lab Results   Component Value Date    WBC 2.50 (L) 2018    HGB 8.2 (L) 2018    HCT 25.8 (L) 2018     (H) 2018    PLT 98 (L) 2018     No results for input(s): TSH, FREET4 in the last 168 hours.  Lab Results   Component Value Date    HGBA1C 5.2 2018       Nutritional status:   Body mass index is 35.86 kg/m².  Lab Results   Component Value Date    ALBUMIN 2.5 (L) 2018    ALBUMIN 2.7 (L) 08/15/2018    ALBUMIN 2.6 (L) 2018     Lab Results   Component Value Date    PREALBUMIN 4 (L) 2018    PREALBUMIN 5 (L) 2018    PREALBUMIN 7 (L) 2016       Estimated Creatinine Clearance: 34.9 mL/min (A) (based on SCr of 3.1 mg/dL (H)).    Accu-Checks  Recent Labs      18   2214  18   0722  18   1153  18   1706  18   2102  08/15/18   0504  08/15/18   0733  08/15/18   1158  08/15/18   1658  18   0747   POCTGLUCOSE  110  81  128*  151*  151*  130*  98  153*  171*  94       Current Medications and/or Treatments Impacting Glycemic Control  Immunotherapy:    Immunosuppressants         Stop Route Frequency     mycophenolate capsule 250 mg      -- Oral 2 times daily     tacrolimus capsule 3 mg      -- Oral 2 times " daily        Steroids:   Hormones (From admission, onward)    Start     Stop Route Frequency Ordered    08/19/18 0900  predniSONE tablet 5 mg      08/26 0859 Oral Daily 08/14/18 0957    08/12/18 0900  predniSONE tablet 10 mg      08/19 0859 Oral Daily 08/09/18 0835        Pressors:    Autonomic Drugs (From admission, onward)    None        Hyperglycemia/Diabetes Medications:   Antihyperglycemics (From admission, onward)    Start     Stop Route Frequency Ordered    08/16/18 0900  SITagliptin tablet 25 mg      -- Oral Daily 08/15/18 1351    07/27/18 1805  insulin aspart U-100 pen 0-5 Units      -- SubQ Before meals & nightly PRN 07/27/18 4665

## 2018-08-16 NOTE — SUBJECTIVE & OBJECTIVE
Past Medical History:   Diagnosis Date    Anticoagulant long-term use     Arthritis     Back pain     Cellulitis     Cirrhosis     Coronary artery disease     DM (diabetes mellitus)     Hearing loss     right ear    Hepatic encephalopathy     Hypertension     diagnosed today (11/25/2015) and prescribed toprol    Leg edema     Nephrolithiasis     JACK (obstructive sleep apnea)     Seizures     Splenomegaly      Past Surgical History:   Procedure Laterality Date    APPENDECTOMY      Open    BACK SURGERY      CHOLECYSTECTOMY      laprascopic    LUMBAR DISCECTOMY      SPLENIC ARTERY EMBOLIZATION  04/08/2016    Dr Taveras    TONSILLECTOMY       Review of patient's allergies indicates:   Allergen Reactions    Nsaids (non-steroidal anti-inflammatory drug)      D/t to liver disease.    Penicillins Other (See Comments)     Tolerated pip-tazo in 8/2016 without issue  Tolerated cefepime 7/2018 without issue  Since childhood was told not to take it       Scheduled Medications:    acetaminophen  650 mg Oral Q12H    acyclovir  400 mg Oral BID    aspirin  81 mg Oral Daily    atorvastatin  40 mg Oral Daily    bisacodyl  10 mg Rectal Daily    docusate sodium  100 mg Oral Daily    epoetin dread  10,000 Units Intravenous Every Tues, Thurs, Sat    ergocalciferol  50,000 Units Oral Q7 Days    fluconazole  200 mg Oral Daily    furosemide  80 mg Intravenous BID    heparin (porcine)  5,000 Units Subcutaneous Q8H    levETIRAcetam  500 mg Oral BID    magnesium oxide  800 mg Oral BID    miconazole nitrate 2%   Topical (Top) BID    mycophenolate  250 mg Oral BID    pantoprazole  40 mg Oral BID    predniSONE  10 mg Oral Daily    [START ON 8/19/2018] predniSONE  5 mg Oral Daily    SITagliptin  25 mg Oral Daily    tacrolimus  3 mg Oral BID       PRN Medications: acetaminophen, dextrose 50%, dextrose 50%, gelatin adsorbable 100cm top sponge, glucagon (human recombinant), glucose, glucose, insulin aspart  U-100, ondansetron, oxyCODONE, ramelteon, simethicone, sodium chloride 0.9%    Family History     None        Tobacco Use    Smoking status: Never Smoker    Smokeless tobacco: Current User     Types: Chew   Substance and Sexual Activity    Alcohol use: No     Alcohol/week: 0.0 oz    Drug use: No    Sexual activity: Not on file     Review of Systems   Constitutional: Positive for fatigue. Negative for chills and fever.   HENT: Negative for congestion and voice change.    Eyes: Negative for discharge.   Respiratory: Negative for cough, shortness of breath and wheezing.    Cardiovascular: Positive for leg swelling. Negative for chest pain and palpitations.   Gastrointestinal: Positive for nausea. Negative for abdominal pain.   Genitourinary: Positive for flank pain. Negative for difficulty urinating.   Musculoskeletal: Positive for gait problem. Negative for myalgias.   Skin: Negative for color change and rash.   Neurological: Positive for weakness. Negative for numbness.   Psychiatric/Behavioral: Negative for agitation and dysphoric mood.     Objective:     Vital Signs (Most Recent):  Temp: 98.1 °F (36.7 °C) (08/16/18 0735)  Pulse: 77 (08/16/18 0735)  Resp: 16 (08/16/18 0735)  BP: (!) 162/75 (08/16/18 0735)  SpO2: 95 % (08/16/18 0735)    Vital Signs (24h Range):  Temp:  [97.9 °F (36.6 °C)-98.7 °F (37.1 °C)] 98.1 °F (36.7 °C)  Pulse:  [68-94] 77  Resp:  [16-18] 16  SpO2:  [95 %-98 %] 95 %  BP: (129-162)/(60-75) 162/75     Body mass index is 35.86 kg/m².    Physical Exam   Constitutional: He is oriented to person, place, and time. He appears well-developed.   Mother at bedside   HENT:   Head: Normocephalic and atraumatic.   Eyes: Conjunctivae are normal. Right eye exhibits no discharge. Left eye exhibits no discharge.   Neck: Neck supple.   Cardiovascular: Normal rate and regular rhythm.   2+ BLE edema    Pulmonary/Chest: Effort normal. No respiratory distress.   Abdominal: Soft. There is no tenderness.    Musculoskeletal: He exhibits no edema or deformity.   Neurological: He is alert and oriented to person, place, and time. No sensory deficit.   Skin: Skin is warm and dry.   Dressing noted to R side of neck    Psychiatric: He has a normal mood and affect. His behavior is normal.     NEUROLOGICAL EXAMINATION:     MENTAL STATUS   Oriented to person, place, and time.       Diagnostic Results: Labs: Reviewed

## 2018-08-16 NOTE — PROGRESS NOTES
"[facility]  Endocrinology  Progress Note    Admit Date: 2018     Reason for Consult: Management of hypothermia and followed by consult on steroid induced hyperglycemia     Diabetes diagnosis year: >5 years by PCP but was later told he did not have diabetes. Was on glucovance for a short period of time but not taking any medication or checking BG prior to transplant.       HPI:   Patient is a 63 y.o. 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 Hospital course was complicated by hepatic encephalopathy. During the course, he has also had few episodes of hypothermia with temp as low as 93 F. He completed 7 day course of vancomycin for cellilitis, and is currently on vancomycin and cefepime for possible sepsis. However he continues to be hypothermic.     Further work-up of hypothermia was done, which showed TSH: 2.714, FT4: 0.53, AM cortisol: 9.9.    Interval HPI:   Overnight events: remains in TSU. BG AT goal overnight. Not requiring correction scale. Prednisone 10 mg daily. Awaiting transfer to rehab; tentative transfer tomorrow.   Eatin-100%  Nausea: No  Hypoglycemia and intervention: No  Fever: No  TPN and/or TF: No    BP (!) 162/75 (BP Location: Left arm, Patient Position: Sitting)   Pulse 77   Temp 98.1 °F (36.7 °C) (Oral)   Resp 16   Ht 6' 2" (1.88 m)   Wt 126.7 kg (279 lb 5.2 oz)   SpO2 95%   BMI 35.86 kg/m²      Labs Reviewed and Include    Recent Labs   Lab  18   0421   GLU  112*   CALCIUM  8.5*   ALBUMIN  2.5*   PROT  4.5*   NA  136   K  4.8   CO2  25   CL  104   BUN  62*   CREATININE  3.1*   ALKPHOS  53*   ALT  13   AST  10   BILITOT  0.5     Lab Results   Component " Value Date    WBC 2.50 (L) 08/16/2018    HGB 8.2 (L) 08/16/2018    HCT 25.8 (L) 08/16/2018     (H) 08/16/2018    PLT 98 (L) 08/16/2018     No results for input(s): TSH, FREET4 in the last 168 hours.  Lab Results   Component Value Date    HGBA1C 5.2 07/22/2018       Nutritional status:   Body mass index is 35.86 kg/m².  Lab Results   Component Value Date    ALBUMIN 2.5 (L) 08/16/2018    ALBUMIN 2.7 (L) 08/15/2018    ALBUMIN 2.6 (L) 08/14/2018     Lab Results   Component Value Date    PREALBUMIN 4 (L) 06/30/2018    PREALBUMIN 5 (L) 01/22/2018    PREALBUMIN 7 (L) 08/31/2016       Estimated Creatinine Clearance: 34.9 mL/min (A) (based on SCr of 3.1 mg/dL (H)).    Accu-Checks  Recent Labs      08/13/18   2214  08/14/18   0722  08/14/18   1153  08/14/18   1706  08/14/18   2102  08/15/18   0504  08/15/18   0733  08/15/18   1158  08/15/18   1658  08/16/18   0747   POCTGLUCOSE  110  81  128*  151*  151*  130*  98  153*  171*  94       Current Medications and/or Treatments Impacting Glycemic Control  Immunotherapy:    Immunosuppressants         Stop Route Frequency     mycophenolate capsule 250 mg      -- Oral 2 times daily     tacrolimus capsule 3 mg      -- Oral 2 times daily        Steroids:   Hormones (From admission, onward)    Start     Stop Route Frequency Ordered    08/19/18 0900  predniSONE tablet 5 mg      08/26 0859 Oral Daily 08/14/18 0957    08/12/18 0900  predniSONE tablet 10 mg      08/19 0859 Oral Daily 08/09/18 0835        Pressors:    Autonomic Drugs (From admission, onward)    None        Hyperglycemia/Diabetes Medications:   Antihyperglycemics (From admission, onward)    Start     Stop Route Frequency Ordered    08/16/18 0900  SITagliptin tablet 25 mg      -- Oral Daily 08/15/18 1351    07/27/18 1805  insulin aspart U-100 pen 0-5 Units      -- SubQ Before meals & nightly PRN 07/27/18 1705          ASSESSMENT and PLAN    * Liver transplanted    S/p transplanted.  Currently on scheduled steroid  taper.  Per transplant  Avoid hypoglycemia.         Steroid-induced hyperglycemia    No previous history of DM  BG goal 140-180.     Start januvia 25 mg daily.  BG monitoring ac/hs and low dose correction scale.     Discharge:  TBD.          Long-term use of immunosuppressant medication    May increase insulin resistance.             Stage II pressure ulcer of sacral region    Optimize BG.           CKD (chronic kidney disease), stage III    Avoid insulin stacking and hypoglycemia.  Lab Results   Component Value Date    CREATININE 3.1 (H) 08/16/2018               Acute kidney injury superimposed on chronic kidney disease      Avoid insulin stacking            Alma Snyder NP  Endocrinology  [facility]

## 2018-08-16 NOTE — PLAN OF CARE
Problem: Patient Care Overview  Goal: Plan of Care Review  Outcome: Ongoing (interventions implemented as appropriate)  AAOx3, afebrile, c/o pain. Pain in shoulders mildly controlled by scheduled tylenol. PRN pain medication given along with heating backs. BG monitored ACHS- no coverage needed. BG= 151 at bedtime. Telemetry monitor in place (NSR). Pt continues to be able sit up in bed independently. Self meds and incision care 100% per mother. Pt in lowest position, side rails up, non-skid foot wear in place, call light within reach, pt verbalized understanding to call RN when needed.   Hand hygiene practiced per protocol. Will continue to monitor.

## 2018-08-16 NOTE — PROGRESS NOTES
[facility]  Nephrology  Progress Note    Patient Name: Alon Adamson  MRN: [id number]  Admission Date: 6/22/2018  Hospital Length of Stay: 55 days  Attending Provider: Elan Guerra MD   Primary Care Physician: Mckinley Vides MD  Principal Problem:Liver transplanted    Subjective:     HPI: Mr. Adamson is 63 yr old male with decompensated alcoholic cirrhosis, CKD III and CAD admitted here on 06/22/18 admitted for bilateral LE hypervolemia and VICKY on CKD III with cr of 2.3 baseline around 1.5. Patient has multiple hospitalization in the past secondary to decompensated liver failure. Patient is currently on transplant team. During current admission patient was getting lasix and albumin intermittently for VICKY however renal function was not getting better. Patient hospital course complicated by hepatic encephalopathy with some improvement of mentation with lactulose. Also treated for cellulitis with 7 days of vancomycin.Nephrology is consulted for worsening/not improving VICKY despite lasix/albumin.     Per chart review patient has no hypotensive episodes, BP mostly above 100's. Has not received nephrotoxins such as NSAIDs/contrast media. No sever sepsis/septic shock or acute blood loss during current admit. UA with 2+ leukocytes and Hyaline casts, no wbc/rbc cast or proteinuria. Urine Na+ < 20.    Patient was transferred to ICU on 7/13/2018 after being more lethargic and hypoactive.  After two days was stepped down back to [facility].       Interval History: No acute events overnight. Patient with continued good uop, although not currently able to record. Interval decrease in Cr 3.4-->3.1.     Review of patient's allergies indicates:   Allergen Reactions    Nsaids (non-steroidal anti-inflammatory drug)      D/t to liver disease.    Penicillins Other (See Comments)     Tolerated pip-tazo in 8/2016 without issue  Tolerated cefepime 7/2018 without issue  Since childhood was told not to take it      Current Facility-Administered Medications   Medication Frequency    acetaminophen tablet 500 mg Q6H PRN    acetaminophen tablet 650 mg Q12H    acyclovir capsule 400 mg BID    aspirin EC tablet 81 mg Daily    atorvastatin tablet 40 mg Daily    atovaquone suspension 1,500 mg Daily    bisacodyl suppository 10 mg Daily    dextrose 50% injection 12.5 g PRN    dextrose 50% injection 25 g PRN    docusate sodium capsule 100 mg Daily    epoetin dread injection 10,000 Units Every Tues, Thurs, Sat    ergocalciferol capsule 50,000 Units Q7 Days    fluconazole tablet 200 mg Daily    furosemide injection 80 mg BID    gelatin adsorbable 100cm top sponge 100 sponge 1 applicator PRN    glucagon (human recombinant) injection 1 mg PRN    glucose chewable tablet 16 g PRN    glucose chewable tablet 24 g PRN    heparin (porcine) injection 5,000 Units Q8H    insulin aspart U-100 pen 0-5 Units QID (AC + HS) PRN    levETIRAcetam tablet 500 mg BID    magnesium oxide tablet 800 mg TID    miconazole nitrate 2% ointment BID    mycophenolate capsule 250 mg BID    ondansetron disintegrating tablet 4 mg Daily    ondansetron injection 4 mg Q8H PRN    oxyCODONE immediate release tablet 5 mg Q4H PRN    pantoprazole EC tablet 40 mg BID    predniSONE tablet 10 mg Daily    [START ON 8/19/2018] predniSONE tablet 5 mg Daily    ramelteon tablet 8 mg Nightly PRN    simethicone chewable tablet 80 mg TID PRN    SITagliptin tablet 25 mg Daily    sodium chloride 0.9% flush 3 mL PRN    tacrolimus capsule 3 mg BID       Objective:     Vital Signs (Most Recent):  Temp: 98.5 °F (36.9 °C) (08/16/18 1156)  Pulse: 79 (08/16/18 1156)  Resp: 18 (08/16/18 1156)  BP: (!) 155/72 (08/16/18 1156)  SpO2: 95 % (08/16/18 1156)  O2 Device (Oxygen Therapy): room air (08/16/18 0735) Vital Signs (24h Range):  Temp:  [98.1 °F (36.7 °C)-98.7 °F (37.1 °C)] 98.5 °F (36.9 °C)  Pulse:  [68-94] 79  Resp:  [16-18] 18  SpO2:  [95 %-98 %] 95 %  BP:  (129-162)/(60-75) 155/72     Weight: 126.7 kg (279 lb 5.2 oz) (08/16/18 0300)  Body mass index is 35.86 kg/m².  Body surface area is 2.57 meters squared.    I/O last 3 completed shifts:  In: 2620 [P.O.:2620]  Out: 0     Physical Exam   Constitutional: He appears well-developed and well-nourished. No distress.   HENT:   Head: Normocephalic and atraumatic.   Eyes: Conjunctivae are normal. No scleral icterus.   Neck: Normal range of motion. Neck supple.   Cardiovascular: Normal rate, regular rhythm and normal heart sounds. Exam reveals no gallop and no friction rub.   No murmur heard.  Pulmonary/Chest: Breath sounds normal. No respiratory distress. He has no rales.   Abdominal: Soft. Bowel sounds are normal. He exhibits no distension. There is no tenderness.   Musculoskeletal: Normal range of motion. He exhibits edema. He exhibits no tenderness.   Skin: Skin is warm and dry. No rash noted. He is not diaphoretic. No erythema. No pallor.       Significant Labs:  CBC:   Recent Labs   Lab  08/16/18   0421   WBC  2.50*   RBC  2.57*   HGB  8.2*   HCT  25.8*   PLT  98*   MCV  100*   MCH  31.9*   MCHC  31.8*     CMP:   Recent Labs   Lab  08/16/18   0421   GLU  112*   CALCIUM  8.5*   ALBUMIN  2.5*   PROT  4.5*   NA  136   K  4.8   CO2  25   CL  104   BUN  62*   CREATININE  3.1*   ALKPHOS  53*   ALT  13   AST  10   BILITOT  0.5          Assessment/Plan:     Acute kidney injury superimposed on chronic kidney disease    ATN on CKD    Continues with good urine output, although not recorded  Still edematous LE b/l    -No need for RRT at this time  -Continue lasix at 80mg IV BID  -Rec low sodium diet (2g)  - Strict I/Os, serial RFPs  - Avoid nephrotoxic medications  - Hb > 7gm/dL            Thank you for your consult. I will follow-up with patient. Please contact us if you have any additional questions.    Otoniel Velarde MD  Nephrology  [facility]    ATTENDING PHYSICIAN ATTESTATION  I have personally  interviewed and examined the patient. I thoroughly reviewed the demographic, clinical, laboratorial and imaging information available in medical records. I agree with the assessment and recommendations provided by the subspecialty resident. Dr. Velarde was under my supervision.

## 2018-08-16 NOTE — PLAN OF CARE
Problem: Physical Therapy Goal  Goal: Physical Therapy Goal  Goals to be met by: 2018    Patient will increase functional independence with mobility by performin. Supine to sit with moderate assistance -  Met  Updated: Supine to sit with Supervision - met  Updated: Supine to sit with Mod I  2. Sit to stand with moderate assistance - met (2018)  Updated: Sit to stand with minimum assistance using rolling walker  3. Gait x 50ft with minimal assistance with RW. - not met  4. Pt will be able to perform hip flexion, hip abduction, and heel raises while standing with use of rolling walker (denoting improvement in balance)- Min A. Not met                  Outcome: Ongoing (interventions implemented as appropriate)  Goals reviewed and remain appropriate. Pt progressing towards goals.    Lucille Craig, PT, DPT   2018  [phone]

## 2018-08-16 NOTE — ASSESSMENT & PLAN NOTE
-2/2 liver transplant with complicated hospital course     See hospital course for functional, cognitive/speech/language, and nutrition/swallow status.      Recommendations  -  Encourage mobility, OOB in chair at least 3 hours per day, and early ambulation as appropriate  -  PT/OT evaluate and treat  -  Pain management  -  Monitor for and prevent skin breakdown and pressure ulcers  · Early mobility, repositioning/weight shifting every 20-30 minutes when sitting, turn patient every 2 hours, proper mattress/overlay and chair cushioning, pressure relief/heel protector boots  -  DVT prophylaxis:  [facility]  -  Reviewed discharge options (IP rehab, SNF, HH therapy, and OP therapy)

## 2018-08-16 NOTE — PROGRESS NOTES
[facility]  Physical Medicine & Rehab  Progress Note    Patient Name: Alon Adamson  MRN: [id number]  Admission Date: 6/22/2018  Length of Stay: 55 days  Attending Physician: Elan Guerra MD    Subjective:     Principal Problem:Liver transplanted      (08/16/2018): Patient is seen for follow-up rehab evaluation and recommendations.  Participating with therapy and making some progress.  Barriers for discharge/rehab admission: medical stability (neutropenia, IVP lasix, VICKY improving).     Hospital Course:   07/27/2018: Evaluated by therapy.  Bed mobility, sit to stand, and transfers TA.  All ADLs TA.   8/13/18: 08/15/2018: Participated with therapy.  Bed mobility SBA-Otto.  Sit to stand ModA x 2 ppl.  UBD ModA. LBD/toileting TA.  08/15/2018: Participated with therapy.  Bed mobility SV-SBA. Sit to stand ModA x 2 ppl.  Ambulated 10 steps CGA-Otto . Sat EOB ~30 mins SV-SBA.UBD ModA. LBD TA.     Past Medical History:   Diagnosis Date    Anticoagulant long-term use     Arthritis     Back pain     Cellulitis     Cirrhosis     Coronary artery disease     DM (diabetes mellitus)     Hearing loss     right ear    Hepatic encephalopathy     Hypertension     diagnosed today (11/25/2015) and prescribed toprol    Leg edema     Nephrolithiasis     JACK (obstructive sleep apnea)     Seizures     Splenomegaly      Past Surgical History:   Procedure Laterality Date    APPENDECTOMY      Open    BACK SURGERY      CHOLECYSTECTOMY      laprascopic    LUMBAR DISCECTOMY      SPLENIC ARTERY EMBOLIZATION  04/08/2016    Dr Taveras    TONSILLECTOMY       Review of patient's allergies indicates:   Allergen Reactions    Nsaids (non-steroidal anti-inflammatory drug)      D/t to liver disease.    Penicillins Other (See Comments)     Tolerated pip-tazo in 8/2016 without issue  Tolerated cefepime 7/2018 without issue  Since childhood was told not to take it       Scheduled Medications:    acetaminophen   650 mg Oral Q12H    acyclovir  400 mg Oral BID    aspirin  81 mg Oral Daily    atorvastatin  40 mg Oral Daily    bisacodyl  10 mg Rectal Daily    docusate sodium  100 mg Oral Daily    epoetin dread  10,000 Units Intravenous Every Tues, Thurs, Sat    ergocalciferol  50,000 Units Oral Q7 Days    fluconazole  200 mg Oral Daily    furosemide  80 mg Intravenous BID    heparin (porcine)  5,000 Units Subcutaneous Q8H    levETIRAcetam  500 mg Oral BID    magnesium oxide  800 mg Oral BID    miconazole nitrate 2%   Topical (Top) BID    mycophenolate  250 mg Oral BID    pantoprazole  40 mg Oral BID    predniSONE  10 mg Oral Daily    [START ON 8/19/2018] predniSONE  5 mg Oral Daily    SITagliptin  25 mg Oral Daily    tacrolimus  3 mg Oral BID       PRN Medications: acetaminophen, dextrose 50%, dextrose 50%, gelatin adsorbable 100cm top sponge, glucagon (human recombinant), glucose, glucose, insulin aspart U-100, ondansetron, oxyCODONE, ramelteon, simethicone, sodium chloride 0.9%    Family History     Unknown per patient.         Tobacco Use    Smoking status: Never Smoker    Smokeless tobacco: Current User     Types: Chew   Substance and Sexual Activity    Alcohol use: No     Alcohol/week: 0.0 oz    Drug use: No    Sexual activity: Not on file     Review of Systems   Constitutional: Positive for fatigue. Negative for chills and fever.   HENT: Negative for congestion and voice change.    Eyes: Negative for discharge.   Respiratory: Negative for cough, shortness of breath and wheezing.    Cardiovascular: Positive for leg swelling. Negative for chest pain and palpitations.   Gastrointestinal: Positive for nausea. Negative for abdominal pain.   Genitourinary: Positive for flank pain. Negative for difficulty urinating.   Musculoskeletal: Positive for gait problem. Negative for myalgias.   Skin: Negative for color change and rash.   Neurological: Positive for weakness. Negative for numbness.    Psychiatric/Behavioral: Negative for agitation and dysphoric mood.     Objective:     Vital Signs (Most Recent):  Temp: 98.1 °F (36.7 °C) (08/16/18 0735)  Pulse: 77 (08/16/18 0735)  Resp: 16 (08/16/18 0735)  BP: (!) 162/75 (08/16/18 0735)  SpO2: 95 % (08/16/18 0735)    Vital Signs (24h Range):  Temp:  [97.9 °F (36.6 °C)-98.7 °F (37.1 °C)] 98.1 °F (36.7 °C)  Pulse:  [68-94] 77  Resp:  [16-18] 16  SpO2:  [95 %-98 %] 95 %  BP: (129-162)/(60-75) 162/75     Body mass index is 35.86 kg/m².    Physical Exam   Constitutional: He is oriented to person, place, and time. He appears well-developed.   Mother at bedside   HENT:   Head: Normocephalic and atraumatic.   Eyes: Conjunctivae are normal. Right eye exhibits no discharge. Left eye exhibits no discharge.   2+ BLE edema    Pulmonary/Chest: Effort normal. No respiratory distress.   Abdominal: Soft. There is no tenderness.   Musculoskeletal: He exhibits no edema or deformity.   Neurological: He is alert and oriented to person, place, and time. No sensory deficit.   Skin: Skin is warm and dry.   Dressing noted to R side of neck    Psychiatric: He has a normal mood and affect. His behavior is normal.       Diagnostic Results: Labs: Reviewed      Assessment/Plan:      * Liver transplanted    -s/p liver trx 7/23         Neutropenia     -per Liver trx team, Cellcept 500 mg  -WBC 2.5 on 8/16        Generalized edema    -on Lasix 80 mg BID IV   -BLE edema noted on exam         Stage II pressure ulcer of sacral region    -wound care following         Physical deconditioning    -2/2 liver transplant with complicated hospital course     See hospital course for functional, cognitive/speech/language, and nutrition/swallow status.      Recommendations  -  Encourage mobility, OOB in chair at least 3 hours per day, and early ambulation as appropriate  -  PT/OT evaluate and treat  -  Pain management  -  Monitor for and prevent skin breakdown and pressure ulcers  · Early mobility,  repositioning/weight shifting every 20-30 minutes when sitting, turn patient every 2 hours, proper mattress/overlay and chair cushioning, pressure relief/heel protector boots  -  DVT prophylaxis:  [facility]  -  Reviewed discharge options (IP rehab, SNF, HH therapy, and OP therapy)          Acute kidney injury superimposed on chronic kidney disease    -Nephrology following  -currently BUN 60/Cr. 3.4   -access d/c'd   -on a steroid taper with unknown end date   -on Lasix 80 mg IVP BID         Anemia of chronic disease    -s/p transfusions  -H/H last transfusion 8/5  -currently stable         Thrombocytopenia    -2/2 splenomegaly from portal HTN, currently improving post transplant        Bilateral edema of lower extremity    -on Lasix 80 mg IVP BID  -compression stockings noted         Recommend Inpatient Rehab.  IRF preference per patient/[facility].  Barriers to IRF admission:  Medical stability.        Deepali Simmons NP  Department of Physical Medicine & Rehab   [facility]

## 2018-08-17 ENCOUNTER — TELEPHONE (OUTPATIENT)
Dept: ENDOCRINOLOGY | Facility: HOSPITAL | Age: 63
End: 2018-08-17

## 2018-08-17 VITALS
DIASTOLIC BLOOD PRESSURE: 65 MMHG | TEMPERATURE: 99 F | RESPIRATION RATE: 18 BRPM | OXYGEN SATURATION: 96 % | HEIGHT: 74 IN | HEART RATE: 88 BPM | WEIGHT: 278.19 LBS | BODY MASS INDEX: 35.7 KG/M2 | SYSTOLIC BLOOD PRESSURE: 139 MMHG

## 2018-08-17 LAB
ALBUMIN SERPL BCP-MCNC: 2.6 G/DL
ALP SERPL-CCNC: 55 U/L
ALT SERPL W/O P-5'-P-CCNC: 11 U/L
ANION GAP SERPL CALC-SCNC: 9 MMOL/L
AST SERPL-CCNC: 9 U/L
BASOPHILS # BLD AUTO: 0.04 K/UL
BASOPHILS NFR BLD: 1.3 %
BILIRUB SERPL-MCNC: 0.5 MG/DL
BUN SERPL-MCNC: 61 MG/DL
CALCIUM SERPL-MCNC: 8.8 MG/DL
CHLORIDE SERPL-SCNC: 104 MMOL/L
CO2 SERPL-SCNC: 25 MMOL/L
CREAT SERPL-MCNC: 2.9 MG/DL
DIFFERENTIAL METHOD: ABNORMAL
EOSINOPHIL # BLD AUTO: 0.1 K/UL
EOSINOPHIL NFR BLD: 3.2 %
ERYTHROCYTE [DISTWIDTH] IN BLOOD BY AUTOMATED COUNT: 18.4 %
EST. GFR  (AFRICAN AMERICAN): 25.6 ML/MIN/1.73 M^2
EST. GFR  (NON AFRICAN AMERICAN): 22.2 ML/MIN/1.73 M^2
FUNGUS SPEC CULT: NORMAL
GLUCOSE SERPL-MCNC: 86 MG/DL
HCT VFR BLD AUTO: 27.4 %
HGB BLD-MCNC: 8.7 G/DL
IMM GRANULOCYTES # BLD AUTO: 0.03 K/UL
IMM GRANULOCYTES NFR BLD AUTO: 1 %
LYMPHOCYTES # BLD AUTO: 0.4 K/UL
LYMPHOCYTES NFR BLD: 13.1 %
MAGNESIUM SERPL-MCNC: 1.5 MG/DL
MCH RBC QN AUTO: 32.2 PG
MCHC RBC AUTO-ENTMCNC: 31.8 G/DL
MCV RBC AUTO: 102 FL
MONOCYTES # BLD AUTO: 0.6 K/UL
MONOCYTES NFR BLD: 17.6 %
NEUTROPHILS # BLD AUTO: 2 K/UL
NEUTROPHILS NFR BLD: 63.8 %
NRBC BLD-RTO: 0 /100 WBC
PHOSPHATE SERPL-MCNC: 4.2 MG/DL
PLATELET # BLD AUTO: 112 K/UL
PMV BLD AUTO: 10.2 FL
POCT GLUCOSE: 122 MG/DL (ref 70–110)
POCT GLUCOSE: 82 MG/DL (ref 70–110)
POTASSIUM SERPL-SCNC: 4.8 MMOL/L
PROT SERPL-MCNC: 4.8 G/DL
RBC # BLD AUTO: 2.7 M/UL
SODIUM SERPL-SCNC: 138 MMOL/L
T4 FREE SERPL-MCNC: 0.65 NG/DL
TACROLIMUS BLD-MCNC: 6.8 NG/ML
TSH SERPL DL<=0.005 MIU/L-ACNC: 5.56 UIU/ML
WBC # BLD AUTO: 3.13 K/UL

## 2018-08-17 PROCEDURE — 63600175 PHARM REV CODE 636 W HCPCS: Performed by: NURSE PRACTITIONER

## 2018-08-17 PROCEDURE — 80053 COMPREHEN METABOLIC PANEL: CPT

## 2018-08-17 PROCEDURE — G8989 SELF CARE D/C STATUS: HCPCS | Mod: CL

## 2018-08-17 PROCEDURE — 84443 ASSAY THYROID STIM HORMONE: CPT

## 2018-08-17 PROCEDURE — 84439 ASSAY OF FREE THYROXINE: CPT

## 2018-08-17 PROCEDURE — 36415 COLL VENOUS BLD VENIPUNCTURE: CPT

## 2018-08-17 PROCEDURE — 25000003 PHARM REV CODE 250: Performed by: PHYSICIAN ASSISTANT

## 2018-08-17 PROCEDURE — 99239 HOSP IP/OBS DSCHRG MGMT >30: CPT | Mod: 24,,, | Performed by: NURSE PRACTITIONER

## 2018-08-17 PROCEDURE — 85025 COMPLETE CBC W/AUTO DIFF WBC: CPT

## 2018-08-17 PROCEDURE — 84100 ASSAY OF PHOSPHORUS: CPT

## 2018-08-17 PROCEDURE — 63600175 PHARM REV CODE 636 W HCPCS: Performed by: PHYSICIAN ASSISTANT

## 2018-08-17 PROCEDURE — 25000003 PHARM REV CODE 250: Performed by: NURSE PRACTITIONER

## 2018-08-17 PROCEDURE — G8988 SELF CARE GOAL STATUS: HCPCS | Mod: CK

## 2018-08-17 PROCEDURE — 99232 SBSQ HOSP IP/OBS MODERATE 35: CPT | Mod: GC,,, | Performed by: INTERNAL MEDICINE

## 2018-08-17 PROCEDURE — 80197 ASSAY OF TACROLIMUS: CPT

## 2018-08-17 PROCEDURE — 25000003 PHARM REV CODE 250: Performed by: STUDENT IN AN ORGANIZED HEALTH CARE EDUCATION/TRAINING PROGRAM

## 2018-08-17 PROCEDURE — 63600175 PHARM REV CODE 636 W HCPCS: Performed by: STUDENT IN AN ORGANIZED HEALTH CARE EDUCATION/TRAINING PROGRAM

## 2018-08-17 PROCEDURE — 83735 ASSAY OF MAGNESIUM: CPT

## 2018-08-17 PROCEDURE — 25000003 PHARM REV CODE 250: Performed by: SURGERY

## 2018-08-17 PROCEDURE — G8987 SELF CARE CURRENT STATUS: HCPCS | Mod: CL

## 2018-08-17 RX ORDER — ATORVASTATIN CALCIUM 40 MG/1
40 TABLET, FILM COATED ORAL DAILY
Qty: 30 TABLET | Refills: 5 | Status: SHIPPED | OUTPATIENT
Start: 2018-08-17 | End: 2020-07-29

## 2018-08-17 RX ORDER — MYCOPHENOLATE MOFETIL 250 MG/1
250 CAPSULE ORAL 2 TIMES DAILY
Qty: 240 CAPSULE | Refills: 2 | Status: CANCELLED | OUTPATIENT
Start: 2018-08-17 | End: 2019-08-17

## 2018-08-17 RX ORDER — LANOLIN ALCOHOL/MO/W.PET/CERES
800 CREAM (GRAM) TOPICAL 3 TIMES DAILY
Qty: 180 TABLET | Refills: 5 | Status: SHIPPED | OUTPATIENT
Start: 2018-08-17 | End: 2020-07-17 | Stop reason: ALTCHOICE

## 2018-08-17 RX ORDER — LEVETIRACETAM 500 MG/1
500 TABLET ORAL 2 TIMES DAILY
Qty: 60 TABLET | Refills: 1 | Status: SHIPPED | OUTPATIENT
Start: 2018-08-17 | End: 2018-11-30 | Stop reason: SDUPTHER

## 2018-08-17 RX ORDER — ERGOCALCIFEROL 1.25 MG/1
50000 CAPSULE ORAL
Qty: 8 CAPSULE | Refills: 0 | Status: CANCELLED | OUTPATIENT
Start: 2018-08-23

## 2018-08-17 RX ORDER — LANOLIN ALCOHOL/MO/W.PET/CERES
800 CREAM (GRAM) TOPICAL 3 TIMES DAILY
Refills: 0 | Status: CANCELLED | COMMUNITY
Start: 2018-08-17

## 2018-08-17 RX ORDER — ERGOCALCIFEROL 1.25 MG/1
50000 CAPSULE ORAL
Qty: 10 CAPSULE | Refills: 0 | Status: SHIPPED | OUTPATIENT
Start: 2018-08-23 | End: 2018-11-16

## 2018-08-17 RX ORDER — ATOVAQUONE 750 MG/5ML
1500 SUSPENSION ORAL DAILY
Qty: 300 ML | Refills: 5 | Status: SHIPPED | OUTPATIENT
Start: 2018-08-18 | End: 2018-09-07

## 2018-08-17 RX ORDER — ATOVAQUONE 750 MG/5ML
1500 SUSPENSION ORAL DAILY
Qty: 1800 ML | Refills: 0 | Status: CANCELLED | OUTPATIENT
Start: 2018-07-24 | End: 2019-01-20

## 2018-08-17 RX ORDER — TACROLIMUS 1 MG/1
3 CAPSULE ORAL EVERY 12 HOURS
Qty: 180 CAPSULE | Refills: 11 | Status: SHIPPED | OUTPATIENT
Start: 2018-08-17 | End: 2018-08-27

## 2018-08-17 RX ORDER — FUROSEMIDE 80 MG/1
80 TABLET ORAL 2 TIMES DAILY
Status: DISCONTINUED | OUTPATIENT
Start: 2018-08-17 | End: 2018-08-17 | Stop reason: HOSPADM

## 2018-08-17 RX ORDER — TACROLIMUS 1 MG/1
3 CAPSULE ORAL EVERY 12 HOURS
Qty: 180 CAPSULE | Refills: 11 | Status: CANCELLED | OUTPATIENT
Start: 2018-08-17 | End: 2019-08-17

## 2018-08-17 RX ORDER — MYCOPHENOLATE MOFETIL 250 MG/1
250 CAPSULE ORAL 2 TIMES DAILY
Qty: 60 CAPSULE | Refills: 2 | Status: SHIPPED | OUTPATIENT
Start: 2018-08-17 | End: 2018-09-07

## 2018-08-17 RX ORDER — PANTOPRAZOLE SODIUM 40 MG/1
40 TABLET, DELAYED RELEASE ORAL
Qty: 60 TABLET | Refills: 11 | Status: SHIPPED | OUTPATIENT
Start: 2018-08-17 | End: 2019-06-17

## 2018-08-17 RX ORDER — PREDNISONE 2.5 MG/1
2.5 TABLET ORAL DAILY
Status: DISCONTINUED | OUTPATIENT
Start: 2018-08-26 | End: 2018-08-17 | Stop reason: HOSPADM

## 2018-08-17 RX ADMIN — SITAGLIPTIN 25 MG: 25 TABLET, FILM COATED ORAL at 08:08

## 2018-08-17 RX ADMIN — PREDNISONE 10 MG: 10 TABLET ORAL at 08:08

## 2018-08-17 RX ADMIN — SIMETHICONE CHEW TAB 80 MG 80 MG: 80 TABLET ORAL at 10:08

## 2018-08-17 RX ADMIN — ATORVASTATIN CALCIUM 40 MG: 20 TABLET, FILM COATED ORAL at 08:08

## 2018-08-17 RX ADMIN — MICONAZOLE NITRATE: 20 OINTMENT TOPICAL at 11:08

## 2018-08-17 RX ADMIN — ATOVAQUONE 1500 MG: 750 SUSPENSION ORAL at 08:08

## 2018-08-17 RX ADMIN — ACETAMINOPHEN 650 MG: 325 TABLET, FILM COATED ORAL at 08:08

## 2018-08-17 RX ADMIN — ASPIRIN 81 MG: 81 TABLET, COATED ORAL at 08:08

## 2018-08-17 RX ADMIN — TACROLIMUS 3 MG: 1 CAPSULE ORAL at 07:08

## 2018-08-17 RX ADMIN — ONDANSETRON 4 MG: 4 TABLET, ORALLY DISINTEGRATING ORAL at 05:08

## 2018-08-17 RX ADMIN — MYCOPHENOLATE MOFETIL 250 MG: 250 CAPSULE ORAL at 10:08

## 2018-08-17 RX ADMIN — PANTOPRAZOLE SODIUM 40 MG: 40 TABLET, DELAYED RELEASE ORAL at 05:08

## 2018-08-17 RX ADMIN — FUROSEMIDE 80 MG: 10 INJECTION, SOLUTION INTRAMUSCULAR; INTRAVENOUS at 08:08

## 2018-08-17 RX ADMIN — HEPARIN SODIUM 5000 UNITS: 5000 INJECTION, SOLUTION INTRAVENOUS; SUBCUTANEOUS at 05:08

## 2018-08-17 RX ADMIN — ACYCLOVIR 400 MG: 200 CAPSULE ORAL at 08:08

## 2018-08-17 RX ADMIN — MAGNESIUM OXIDE TAB 400 MG (241.3 MG ELEMENTAL MG) 800 MG: 400 (241.3 MG) TAB at 08:08

## 2018-08-17 RX ADMIN — LEVETIRACETAM 500 MG: 500 TABLET ORAL at 08:08

## 2018-08-17 NOTE — PROGRESS NOTES
Compression wraps removed at this time and will place in tubular elastic bandage for compression. Discussed long term compression options with Myrtle Sellers NP as they are planning on discharging the patient today.   Mr Chi refuses to apply compression stockings but is willing to don a velcro compression wrap like the Juxtalite from [facility].   Suggest ordering Juxtalie to BLE and maintain at 35mmHg compression for edema management. Wrap is available at [facility] and will offer a long term compression option.   Continue Sween moisturizer to lower extremities.   Rickie Davis RN CWON  [id number]

## 2018-08-17 NOTE — ASSESSMENT & PLAN NOTE
No previous history of DM  BG goal 140-180.     Continue januvia 25 mg daily.  BG monitoring ac/hs and low dose correction scale.     Discharge:  Januvia 25 mg daily. BG monitoring ac/hs and low dose correction scale. - for rehab/SNF. Follow up with diabetes education/endocrine if still requiring medication once d/c out of rehab.

## 2018-08-17 NOTE — ASSESSMENT & PLAN NOTE
Avoid insulin stacking and hypoglycemia.  Lab Results   Component Value Date    CREATININE 2.9 (H) 08/17/2018

## 2018-08-17 NOTE — SUBJECTIVE & OBJECTIVE
"Interval HPI:   Overnight events:remains in TSU. BG at goal with januvia; not requiring correction scale. Prednisone 10 mg daily. Tentative transfer to rehab today.   Eatin%  Nausea: No  Hypoglycemia and intervention: No  Fever: No  TPN and/or TF: No    /65 (BP Location: Left arm, Patient Position: Lying)   Pulse 76   Temp 98.6 °F (37 °C) (Oral)   Resp 18   Ht 6' 2" (1.88 m)   Wt 126.2 kg (278 lb 3.2 oz)   SpO2 96%   BMI 35.72 kg/m²     Labs Reviewed and Include    Recent Labs   Lab  18   0615   GLU  86   CALCIUM  8.8   ALBUMIN  2.6*   PROT  4.8*   NA  138   K  4.8   CO2  25   CL  104   BUN  61*   CREATININE  2.9*   ALKPHOS  55   ALT  11   AST  9*   BILITOT  0.5     Lab Results   Component Value Date    WBC 3.13 (L) 2018    HGB 8.7 (L) 2018    HCT 27.4 (L) 2018     (H) 2018     (L) 2018     Recent Labs   Lab  18   0615   TSH  5.556*   FREET4  0.65*     Lab Results   Component Value Date    HGBA1C 5.2 2018       Nutritional status:   Body mass index is 35.72 kg/m².  Lab Results   Component Value Date    ALBUMIN 2.6 (L) 2018    ALBUMIN 2.5 (L) 2018    ALBUMIN 2.7 (L) 08/15/2018     Lab Results   Component Value Date    PREALBUMIN 4 (L) 2018    PREALBUMIN 5 (L) 2018    PREALBUMIN 7 (L) 2016       Estimated Creatinine Clearance: 37.3 mL/min (A) (based on SCr of 2.9 mg/dL (H)).    Accu-Checks  Recent Labs      08/15/18   0504  08/15/18   0733  08/15/18   1158  08/15/18   1658  08/15/18   2131  18   0747  18   1220  18   1731  18   2053  18   0846   POCTGLUCOSE  130*  98  153*  171*  141*  94  155*  179*  175*  82       Current Medications and/or Treatments Impacting Glycemic Control  Immunotherapy:    Immunosuppressants         Stop Route Frequency     mycophenolate capsule 250 mg      -- Oral 2 times daily     tacrolimus capsule 3 mg      -- Oral 2 times daily        Steroids: "   Hormones (From admission, onward)    Start     Stop Route Frequency Ordered    08/19/18 0900  predniSONE tablet 5 mg      08/26 0859 Oral Daily 08/14/18 0957    08/12/18 0900  predniSONE tablet 10 mg      08/19 0859 Oral Daily 08/09/18 0835        Pressors:    Autonomic Drugs (From admission, onward)    None        Hyperglycemia/Diabetes Medications:   Antihyperglycemics (From admission, onward)    Start     Stop Route Frequency Ordered    08/16/18 0900  SITagliptin tablet 25 mg      -- Oral Daily 08/15/18 1351    07/27/18 1805  insulin aspart U-100 pen 0-5 Units      -- SubQ Before meals & nightly PRN 07/27/18 [id number]

## 2018-08-17 NOTE — PROGRESS NOTES
Discharge Plans:    SW met with pt and pts mother Itzel at bedside today to discuss final dc plans.  Pt presents alert and oriented x 4 with good eye contact and communicative.   Pt and pts mom in agreement with dc plans for today at 1pm  to go to [facility] unit down the street.   Pt will transfer via [facility] ([id number]), SW made arrangements with Neema at [facility] for 1pm .   SW able to answer questions about inpatient rehab again today for pt and pts mom.   LEAH will email the inpatient rehab nurse ANDRES and LEAH to ensure smooth dc planning from inpatient rehab to local transplant lodging at [facility].   Pts wife will come this weekend to stay with patient at inpt rehab.  Pt and pts mom deny any other psychosocial needs today.   Pt and pts mom both coping appropriately at this time.  Patient's wife has SW contact information for any psychosocial needs.  SW remains available for all transplant resources, education and psychosocial support.

## 2018-08-17 NOTE — PROGRESS NOTES
Met with patient and his mother prior to discharge to rehab.  Reviewed the answers to the review in the discharge book.  All questions were answered correctly.  This review consists of questions re: post op care, medication management, infection prevention and emergency contacts.  Patient aware that he will come to clinic on Tues 8/21 and will have labs mon and Thurs in rehab.  Allowed time for questions and answers.

## 2018-08-17 NOTE — DISCHARGE SUMMARY
[facility]  Liver Transplant  Discharge Summary      Patient Name: Alon Adamson  MRN: [id number]  Admission Date: 2018  Hospital Length of Stay: 56 days  Discharge Date and Time:  2018 2:07 PM  Attending Physician: No att. providers found   Discharging Provider: Myrtle Sellers NP  Primary Care Provider: Mckinley Vides MD  Post-Operative Day: 25     ORGAN:   LIVER  Disease Etiology: Alcoholic Cirrhosis  Donor Type:    - Brain Death  CDC High Risk:   No  Donor CMV Status:   Donor CMV Status: Negative  Donor HBcAB:   Negative  Donor HCV Status:   Negative  Whole or Partial: Whole Liver  Biliary Anastomosis: End to End  Arterial Anatomy: Standard    HPI:   Mr. Adamson is a 64 yo M with PMH ESLD secondary to ETOH cirrhosis, CKD3, CAD.  Complications of liver disease include hyperbilirubinemia, hypoalbuminemia, severe malnutrition,   hepatic encephalopathy, thrombocytopenia, splenomegaly, ascites, hypervolemia, coagulopathy, portal HTN.  Pt recently admitted for severe hepatic encephalopathy requiring intubation but has been stable from mentation standpoint post resolution of acute enceophalopathy.       Mr. Adamson was seen in clinic today and was noted to have significant hypervolemia, weeping from BLE, and VICKY on CKD3 on routine labs.  His MELD increased from 22 to 26 on todays labs- coordinator notified and MELD updated.  Cr elevated to 2.3 from baseline of 1.4.  Also with worsening ascites.  Pt reports increased continued pruritis, generalized fatigue and weakness.  He presented today in wheelchair.  ROS otherwise negative.  No s/s active infection.      Hospital Course:    Pt admitted  for acutely worsening hepatic encephalopathy and concern for BLE cellulitis as well as severe volume overload.  Complications of ELSD managed while inpt and he received liver offer on 18.  Pt underwent liver transplant without complication: steroid induction, DBD, CMV -/-.  Donor with  E coli bacteremia resistant to cipro placed on Rocephin x 2 weeks per ID recs.  Also on fluconazole x 30 days as pt very ill prior to transplant end date 8/23. Post transplant, with nice trend down of AST/ALT and LFTs.  Liver U/S with elevated RIs and decreased HAV.  During initial post operative period, pt required pressor support.  Pt with expected post operative VICKY as with VICKY prior to surgery.  Nephrology consulted and pt placed on intermittent CRRT.  Pressor requirement decreased and pt tolerated CRRT without pressors.  Transitioned to HD, last HD 8/3.  Renal function slowly improving, Cr 2.9 upon discharge.  Right trialysis cath removed 8/14. Remains hypervolemic, now on lasix 80 mg po bid for diuresis.  Lower extremities edematous, wrapped per wound care. Will need script for Juxtaolite compression stocking - 35mm.    Pt with asymptomatic bradycardia while in ICU prompting cardiology consult. Pt without need for acute intervention but atropine placed at bedside.  HR has improved POD 7 and 8.  He was transferred to the TSU 7/30 PM.          Liver US 7/31 showed small fluid collection likely an adrenal hemorrhage vs hematoma. H&H decreased to 6.6/20.3 8/1- responded to 1 u PRBCs. H/H stable 8/2 AM. Dropped again 8/3 AM- 1 u PRBC given. H&H now stable. Iron studies checked and pt started on epo. H/h has been stable over last week, will d/c epo and monitor.    Patient was hyperkalemic 8/12-8/13.  Bactrim was held.  G6PD w normal activity - will not start dapsone as anemic.  Atovaquone started.    LFTs remain wnl.  Chevron incision healed with steri strips.  Patient working w PT/OT.  He is able to sit up at bedside w/o assist.  Rehab consulted and will be transferred there for further therapy.    Procedure(s) (LRB):  TRANSPLANT, LIVER (N/A)         Final Active Diagnoses:    Diagnosis Date Noted POA    PRINCIPAL PROBLEM:  Liver transplanted [Z94.4] 07/24/2018 Not Applicable    Neutropenia [D70.9] 08/14/2018 No     Generalized edema [R60.1] 08/01/2018 Yes    At risk for opportunistic infections [Z91.89] 07/31/2018 No    Prophylactic immunotherapy [Z29.8]  Not Applicable    Long-term use of immunosuppressant medication [Z79.899]  Not Applicable    Steroid-induced hyperglycemia [R73.9, T38.0X5A] 07/24/2018 No    Nausea and vomiting [R11.2] 07/21/2018 Unknown    CKD (chronic kidney disease), stage III [N18.3]  Yes    Stage II pressure ulcer of sacral region [L89.152] 07/17/2018 Yes    Severe protein-calorie malnutrition [E43]  Yes    Physical deconditioning [R53.81] 06/22/2018 Yes    Alteration in skin integrity [R23.9] 01/22/2018 Yes    Acute kidney injury superimposed on chronic kidney disease [N17.9, N18.9] 01/21/2018 Yes    Thrombocytopenia [D69.6] 08/30/2016 Yes    Anemia of chronic disease [D63.8] 08/30/2016 Yes    Seizures [R56.9] 05/12/2016 Yes    Bilateral edema of lower extremity [R60.0] 02/17/2016 Yes      Problems Resolved During this Admission:    Diagnosis Date Noted Date Resolved POA    Hyperkalemia [E87.5] 08/12/2018 08/14/2018 No    Bradycardia [R00.1] 08/11/2018 08/14/2018 No    Acute venous stasis dermatitis of both legs [I87.2] 07/31/2018 08/10/2018 Yes    Sinus bradycardia [R00.1] 07/26/2018 08/14/2018 No    Positive blood culture in cadaveric donor [Z00.5, R78.81] 07/24/2018 08/07/2018 Not Applicable    Organ transplant candidate [Z76.82] 07/20/2018 07/30/2018 Not Applicable    Hypothermia [T68.XXXA] 07/19/2018 07/30/2018 No    Hypernatremia [E87.0] 07/10/2018 07/17/2018 No    Elevated troponin [R74.8]  07/03/2018 No    Gram-positive bacteremia [R78.81] 06/27/2018 07/03/2018 Yes    Cellulitis of both lower extremities [L03.115, L03.116] 06/25/2018 07/03/2018 No    Xerosis of skin [L85.3] 06/25/2018 07/03/2018 Yes    Coagulopathy [D68.9] 06/22/2018 07/31/2018 Yes    Cholestatic pruritus [L29.8] 06/13/2018 07/31/2018 Yes    Acidosis [E87.2] 06/13/2018 07/30/2018 Yes     Pancytopenia [D61.818] 01/22/2018 08/14/2018 Yes    Decompensated hepatic cirrhosis [K72.90] 01/21/2018 08/14/2018 Unknown    Hepatic encephalopathy [K72.90] 02/17/2016 08/14/2018 Unknown     Chronic       Consults (From admission, onward)        Status Ordering Provider     Inpatient consult to Cardiology  Once     Provider:  (Not yet assigned)    Completed DARIUS ALCOCER     Inpatient consult to Cardiology  Once     Provider:  (Not yet assigned)    Completed ROBYN RASHID     Inpatient consult to Dermatology  Once     Provider:  (Not yet assigned)    Completed UNRULY OSUNA     Inpatient consult to Endocrinology  Once     Provider:  (Not yet assigned)    Completed UNRULY OSUNA     Inpatient consult to Endocrinology  Once     Provider:  (Not yet assigned)    Completed ROSSY REYNAGA     Inpatient consult to Infectious Diseases  Once     Provider:  (Not yet assigned)    Completed DINANA BENTLEY     Inpatient consult to Infectious Diseases  Once     Provider:  (Not yet assigned)    Completed UNRULY OSUNA     Inpatient consult to Kidney/Pancreas Transplant Medicine  Once     Provider:  (Not yet assigned)    Completed UNRULY OSUNA     Inpatient consult to Midline team  Once     Provider:  (Not yet assigned)    Completed VANESSA RAYO     Inpatient consult to Midline team  Once     Provider:  (Not yet assigned)    Completed MATHEUS DAUGHERTY     Inpatient consult to Midline team  Once     Provider:  (Not yet assigned)    Completed JAYY MCDONALD     Inpatient consult to Nephrology  Once     Provider:  (Not yet assigned)    Completed DARIUS ALCOCER     Inpatient consult to Physical Medicine Rehab  Once     Provider:  (Not yet assigned)    Completed JAYY MCDONALD     Inpatient consult to Registered Dietitian/Nutritionist  Once     Provider:  (Not yet assigned)    Completed DIANNA BENTLEY     Inpatient consult to Registered Dietitian/Nutritionist  Once     Provider:  (Not yet assigned)     Completed DIANNA BENTLEY     Inpatient consult to Registered Dietitian/Nutritionist  Once     Provider:  (Not yet assigned)    Completed MATHEUS DAUGHERTY     Inpatient consult to Registered Dietitian/Nutritionist  Once     Provider:  (Not yet assigned)    Completed ROBYN RASHID     Inpatient consult to Registered Dietitian/Nutritionist  Once     Provider:  (Not yet assigned)    Completed IVONNE IVY     Inpatient consult to Registered Dietitian/Nutritionist  Once     Provider:  (Not yet assigned)    Completed ROSSY REYNAGA     Inpatient consult to [facility]  Once     Provider:  (Not yet assigned)    Acknowledged HIRO MARX     Inpatient consult to Spiritual Care  Once     Provider:  (Not yet assigned)    Completed ROD BELCHER          Pending Diagnostic Studies:     Procedure Component Value Units Date/Time    Basic metabolic panel [[id number]] Collected:  07/26/18 1437    Order Status:  Sent Lab Status:  In process Updated:  07/26/18 1438    Specimen:  Blood     Basic metabolic panel [[id number]] Collected:  07/26/18 1437    Order Status:  Sent Lab Status:  In process Updated:  07/26/18 1437    Specimen:  Blood     Basic metabolic panel [[id number]] Collected:  07/23/18 1438    Order Status:  Sent Lab Status:  No result     Specimen:  Blood     Basic metabolic panel [[id number]] Collected:  07/18/18 2133    Order Status:  Sent Lab Status:  In process Updated:  07/18/18 2134    Specimen:  Blood     Basic metabolic panel [[id number]] Collected:  07/14/18 2254    Order Status:  Sent Lab Status:  In process Updated:  07/14/18 2254    Specimen:  Blood     Freeze and Hold -BB HEP [[id number]] Collected:  07/23/18 1328    Order Status:  Sent Lab Status:  No result     Specimen:  Blood     Magnesium [[id number]] Collected:  07/28/18 1410    Order Status:  Sent Lab Status:  In process Updated:  07/28/18 1412    Specimen:  Blood     Magnesium [[id number]] Collected:  07/26/18 1437    Order Status:  Sent Lab  Status:  In process Updated:  07/26/18 1437    Specimen:  Blood     Potassium [[id number]] Collected:  07/14/18 1343    Order Status:  Sent Lab Status:  In process Updated:  07/14/18 1343    Specimen:  Blood     Potassium - if not ordered in last 24 hours [[id number]] Collected:  07/23/18 1427    Order Status:  Sent Lab Status:  In process Updated:  07/23/18 1427    Specimen:  Blood     Renal function panel [[id number]] Collected:  07/28/18 1410    Order Status:  Sent Lab Status:  In process Updated:  07/28/18 1412    Specimen:  Blood     Renal function panel [[id number]] Collected:  07/26/18 1437    Order Status:  Sent Lab Status:  In process Updated:  07/26/18 1437    Specimen:  Blood     Urinalysis [[id number]] Collected:  07/13/18 0714    Order Status:  Sent Lab Status:  In process Updated:  07/13/18 0714    Specimen:  Urine         Significant Diagnostic Studies: Labs:   CMP   Recent Labs   Lab  08/16/18   0421 08/17/18 0615   NA  136  138   K  4.8  4.8   CL  104  104   CO2  25  25   GLU  112*  86   BUN  62*  61*   CREATININE  3.1*  2.9*   CALCIUM  8.5*  8.8   PROT  4.5*  4.8*   ALBUMIN  2.5*  2.6*   BILITOT  0.5  0.5   ALKPHOS  53*  55   AST  10  9*   ALT  13  11   ANIONGAP  7*  9   ESTGFRAFRICA  23.6*  25.6*   EGFRNONAA  20.5*  22.2*   , CBC   Recent Labs   Lab  08/16/18 0421 08/17/18   0615   WBC  2.50*  3.13*   HGB  8.2*  8.7*   HCT  25.8*  27.4*   PLT  98*  112*    and INR   Lab Results   Component Value Date    INR 1.2 07/31/2018    INR 1.3 (H) 07/30/2018    INR 1.5 (H) 07/29/2018       The patients clinical status was discussed at multidisplinary rounds, involving transplant surgery, transplant medicine, pharmacy, nursing, nutrition, and social work    Discharged Condition: good    Disposition: Rehab Facility    Follow Up:    Patient Instructions:      Notify your health care provider if you experience any of the following:  temperature >100.4     Notify your health care provider if you experience any  of the following:  persistent nausea and vomiting or diarrhea     Notify your health care provider if you experience any of the following:  increased confusion or weakness     Notify your health care provider if you experience any of the following:  persistent dizziness, light-headedness, or visual disturbances     Notify your health care provider if you experience any of the following:  worsening rash     Notify your health care provider if you experience any of the following:  severe persistent headache     Notify your health care provider if you experience any of the following:  difficulty breathing or increased cough     Notify your health care provider if you experience any of the following:  redness, tenderness, or signs of infection (pain, swelling, redness, odor or green/yellow discharge around incision site)     Notify your health care provider if you experience any of the following:  severe uncontrolled pain     No dressing needed     Activity as tolerated     Medications:  Reconciled Home Medications:      Medication List      START taking these medications    acyclovir 200 MG capsule  Commonly known as:  ZOVIRAX  Take 2 capsules (400 mg total) by mouth 2 (two) times daily. STOP 10/22/18     atovaquone 750 mg/5 mL Susp  Commonly known as:  MEPRON  Take 10 mLs (1,500 mg total) by mouth once daily.     ergocalciferol 50,000 unit Cap  Commonly known as:  ERGOCALCIFEROL  Take 1 capsule (50,000 Units total) by mouth every 7 days for 10 doses     miconazole nitrate 2% 2 % Oint  Commonly known as:  MICOTIN  Apply topically 2 (two) times daily.     mycophenolate 250 mg Cap  Commonly known as:  CELLCEPT  Take 1 capsule (250 mg total) by mouth 2 (two) times daily.     predniSONE 10 MG tablet  Commonly known as:  DELTASONE  Take 20mg by mouth once daily from 7/29-8/4; Take 15mg daily from 8/5-8/11; Take 10mg  daily from 8/12-8/18; Take 5mg daily from 8/19-8/25;  Take 2.5mg daily from 8/26-9/1; STOP 9/2      tacrolimus 1 MG Cap  Commonly known as:  PROGRAF  Take 3 capsules (3 mg total) by mouth every 12 (twelve) hours.        CHANGE how you take these medications    atorvastatin 40 MG tablet  Commonly known as:  LIPITOR  Take 1 tablet (40 mg total) by mouth once daily.  What changed:  See the new instructions.     docusate sodium 100 MG capsule  Commonly known as:  COLACE  Take 1 capsule (100 mg total) by mouth 3 (three) times daily as needed for Constipation.  What changed:    · when to take this  · reasons to take this     magnesium oxide 400 mg tablet  Commonly known as:  MAG-OX  Take 2 tablets (800 mg total) by mouth 3 (three) times daily.  What changed:    · how much to take  · when to take this        CONTINUE taking these medications    aspirin 81 MG EC tablet  Commonly known as:  ECOTRIN  Take 1 tablet (81 mg total) by mouth once daily.     furosemide 80 MG tablet  Commonly known as:  LASIX  Take 1 tablet (80 mg total) by mouth 2 (two) times daily.     levETIRAcetam 500 MG Tab  Commonly known as:  KEPPRA  Take 1 tablet (500 mg total) by mouth 2 (two) times daily.     pantoprazole 40 MG tablet  Commonly known as:  PROTONIX  Take 1 tablet (40 mg total) by mouth 2 (two) times daily before meals.        STOP taking these medications    ascorbic acid (vitamin C) 1000 MG tablet  Commonly known as:  VITAMIN C     ferrous gluconate 270 mg (27 mg iron) Tab     GENERLAC 10 gram/15 mL solution  Generic drug:  lactulose     rifAXIMin 550 mg Tab  Commonly known as:  XIFAXAN     sodium bicarbonate 650 MG tablet     spironolactone 100 MG tablet  Commonly known as:  ALDACTONE     traMADol 50 mg tablet  Commonly known as:  ULTRAM        ASK your doctor about these medications    fish oil-omega-3 fatty acids 300-1,000 mg capsule  Take 2 capsules (2 g total) by mouth once daily.     ondansetron 4 MG tablet  Commonly known as:  ZOFRAN  Take 1 tablet (4 mg total) by mouth every 8 (eight) hours as needed for Nausea.          Time  spent caring for patient (Greater than 1/2 spent in direct face-to-face contact): > 30 minutes    Myrtle Sellers NP  Liver Transplant  [facility]

## 2018-08-17 NOTE — PLAN OF CARE
Problem: Patient Care Overview  Goal: Plan of Care Review  Pt aaox4 vswnl and c/o pain to bilat shoulders. Mother at bedside. Bed in low position and callbell within reach. Mother pulled self meds 100%. Pt turns independently. +3 ble edema and +3 scrotal edema. accu ck at [phone]. Chevron incision anibal with steristrips. kristina NP removed staples. Dermatitis to scoctum and buttocks with antifungal cream applied. Telemetry maintained nsr. Pt to rehab in am per plan.

## 2018-08-17 NOTE — PROGRESS NOTES
[facility]  Nephrology  Progress Note    Patient Name: Alon Adamson  MRN: [id number]  Admission Date: 6/22/2018  Hospital Length of Stay: 56 days  Attending Provider: Elan Guerra MD   Primary Care Physician: Mckinley Vides MD  Principal Problem:Liver transplanted    Subjective:     HPI: Mr. Adamson is 63 yr old male with decompensated alcoholic cirrhosis, CKD III and CAD admitted here on 06/22/18 admitted for bilateral LE hypervolemia and VICKY on CKD III with cr of 2.3 baseline around 1.5. Patient has multiple hospitalization in the past secondary to decompensated liver failure. Patient is currently on transplant team. During current admission patient was getting lasix and albumin intermittently for VICKY however renal function was not getting better. Patient hospital course complicated by hepatic encephalopathy with some improvement of mentation with lactulose. Also treated for cellulitis with 7 days of vancomycin.Nephrology is consulted for worsening/not improving VICKY despite lasix/albumin.     Per chart review patient has no hypotensive episodes, BP mostly above 100's. Has not received nephrotoxins such as NSAIDs/contrast media. No sever sepsis/septic shock or acute blood loss during current admit. UA with 2+ leukocytes and Hyaline casts, no wbc/rbc cast or proteinuria. Urine Na+ < 20.    Patient was transferred to ICU on 7/13/2018 after being more lethargic and hypoactive.  After two days was stepped down back to [facility]plant [facility].       Interval History: No acute events overnight. Patient states plan to d/c to rehab today. Interval decrease in Cr 3.1-->2.9. Continued good uop, not currently being recorded. On lasix 80 IV BID.    Review of patient's allergies indicates:   Allergen Reactions    Nsaids (non-steroidal anti-inflammatory drug)      D/t to liver disease.    Penicillins Other (See Comments)     Tolerated pip-tazo in 8/2016 without issue  Tolerated cefepime 7/2018 without  issue  Since childhood was told not to take it     Current Facility-Administered Medications   Medication Frequency    acetaminophen tablet 500 mg Q6H PRN    acetaminophen tablet 650 mg Q12H    acyclovir capsule 400 mg BID    aspirin EC tablet 81 mg Daily    atorvastatin tablet 40 mg Daily    atovaquone suspension 1,500 mg Daily    bisacodyl suppository 10 mg Daily    dextrose 50% injection 12.5 g PRN    dextrose 50% injection 25 g PRN    docusate sodium capsule 100 mg Daily    epoetin dread injection 10,000 Units Every Tues, Thurs, Sat    ergocalciferol capsule 50,000 Units Q7 Days    furosemide injection 80 mg BID    gelatin adsorbable 100cm top sponge 100 sponge 1 applicator PRN    glucagon (human recombinant) injection 1 mg PRN    glucose chewable tablet 16 g PRN    glucose chewable tablet 24 g PRN    heparin (porcine) injection 5,000 Units Q8H    insulin aspart U-100 pen 0-5 Units QID (AC + HS) PRN    levETIRAcetam tablet 500 mg BID    magnesium oxide tablet 800 mg TID    miconazole nitrate 2% ointment BID    mycophenolate capsule 250 mg BID    ondansetron disintegrating tablet 4 mg Daily    ondansetron injection 4 mg Q8H PRN    oxyCODONE immediate release tablet 5 mg Q4H PRN    pantoprazole EC tablet 40 mg BID    predniSONE tablet 10 mg Daily    [START ON 8/19/2018] predniSONE tablet 5 mg Daily    ramelteon tablet 8 mg Nightly PRN    simethicone chewable tablet 80 mg TID PRN    SITagliptin tablet 25 mg Daily    sodium chloride 0.9% flush 3 mL PRN    tacrolimus capsule 3 mg BID       Objective:     Vital Signs (Most Recent):  Temp: 98.6 °F (37 °C) (08/17/18 0747)  Pulse: 76 (08/17/18 0747)  Resp: 18 (08/17/18 0747)  BP: 139/65 (08/17/18 0747)  SpO2: 96 % (08/17/18 0747)  O2 Device (Oxygen Therapy): room air (08/17/18 0747) Vital Signs (24h Range):  Temp:  [98 °F (36.7 °C)-98.6 °F (37 °C)] 98.6 °F (37 °C)  Pulse:  [71-88] 76  Resp:  [16-19] 18  SpO2:  [94 %-97 %] 96 %  BP:  (121-155)/(56-72) 139/65     Weight: 126.2 kg (278 lb 3.2 oz) (08/17/18 0400)  Body mass index is 35.72 kg/m².  Body surface area is 2.57 meters squared.    I/O last 3 completed shifts:  In: 655 [P.O.:655]  Out: 0     Physical Exam   Constitutional: He appears well-developed and well-nourished. No distress.   HENT:   Head: Normocephalic and atraumatic.   Eyes: Conjunctivae are normal. No scleral icterus.   Cardiovascular: Normal rate, regular rhythm and normal heart sounds. Exam reveals no gallop and no friction rub.   No murmur heard.  Pulmonary/Chest: Effort normal and breath sounds normal. No respiratory distress. He has no wheezes. He has no rales.   Abdominal: Soft. Bowel sounds are normal. He exhibits no distension. There is no tenderness.   Musculoskeletal: Normal range of motion. He exhibits edema.   Skin: Skin is warm and dry. No rash noted. He is not diaphoretic. No erythema. No pallor.       Significant Labs:  CBC:   Recent Labs   Lab  08/17/18   0615   WBC  3.13*   RBC  2.70*   HGB  8.7*   HCT  27.4*   PLT  112*   MCV  102*   MCH  32.2*   MCHC  31.8*     CMP:   Recent Labs   Lab  08/17/18   0615   GLU  86   CALCIUM  8.8   ALBUMIN  2.6*   PROT  4.8*   NA  138   K  4.8   CO2  25   CL  104   BUN  61*   CREATININE  2.9*   ALKPHOS  55   ALT  11   AST  9*   BILITOT  0.5        Assessment/Plan:     Acute kidney injury superimposed on chronic kidney disease    ATN on CKD    Continues with good urine output, although not recorded  Still edematous LE b/l    -Plan for discharge today  -Rec transition to Lasix 40 mg po BID   -Rec low Na diet (2g)                Thank you for your consult. I will follow-up with patient. Please contact us if you have any additional questions.    Otoniel Velarde MD  Nephrology  [facility]    ATTENDING PHYSICIAN ATTESTATION  I have personally interviewed and examined the patient. I thoroughly reviewed the demographic, clinical, laboratorial and imaging  information available in medical records. I agree with the assessment and recommendations provided by the subspecialty resident. Dr. Velarde was under my supervision.

## 2018-08-17 NOTE — SUBJECTIVE & OBJECTIVE
Interval History: No acute events overnight. Patient states plan to d/c to rehab today. Interval decrease in Cr 3.1-->2.9. Continued good uop, not currently being recorded. On lasix 80 IV BID.    Review of patient's allergies indicates:   Allergen Reactions    Nsaids (non-steroidal anti-inflammatory drug)      D/t to liver disease.    Penicillins Other (See Comments)     Tolerated pip-tazo in 8/2016 without issue  Tolerated cefepime 7/2018 without issue  Since childhood was told not to take it     Current Facility-Administered Medications   Medication Frequency    acetaminophen tablet 500 mg Q6H PRN    acetaminophen tablet 650 mg Q12H    acyclovir capsule 400 mg BID    aspirin EC tablet 81 mg Daily    atorvastatin tablet 40 mg Daily    atovaquone suspension 1,500 mg Daily    bisacodyl suppository 10 mg Daily    dextrose 50% injection 12.5 g PRN    dextrose 50% injection 25 g PRN    docusate sodium capsule 100 mg Daily    epoetin dread injection 10,000 Units Every Tues, Thurs, Sat    ergocalciferol capsule 50,000 Units Q7 Days    furosemide injection 80 mg BID    gelatin adsorbable 100cm top sponge 100 sponge 1 applicator PRN    glucagon (human recombinant) injection 1 mg PRN    glucose chewable tablet 16 g PRN    glucose chewable tablet 24 g PRN    heparin (porcine) injection 5,000 Units Q8H    insulin aspart U-100 pen 0-5 Units QID (AC + HS) PRN    levETIRAcetam tablet 500 mg BID    magnesium oxide tablet 800 mg TID    miconazole nitrate 2% ointment BID    mycophenolate capsule 250 mg BID    ondansetron disintegrating tablet 4 mg Daily    ondansetron injection 4 mg Q8H PRN    oxyCODONE immediate release tablet 5 mg Q4H PRN    pantoprazole EC tablet 40 mg BID    predniSONE tablet 10 mg Daily    [START ON 8/19/2018] predniSONE tablet 5 mg Daily    ramelteon tablet 8 mg Nightly PRN    simethicone chewable tablet 80 mg TID PRN    SITagliptin tablet 25 mg Daily    sodium chloride 0.9%  flush 3 mL PRN    tacrolimus capsule 3 mg BID       Objective:     Vital Signs (Most Recent):  Temp: 98.6 °F (37 °C) (08/17/18 0747)  Pulse: 76 (08/17/18 0747)  Resp: 18 (08/17/18 0747)  BP: 139/65 (08/17/18 0747)  SpO2: 96 % (08/17/18 0747)  O2 Device (Oxygen Therapy): room air (08/17/18 0747) Vital Signs (24h Range):  Temp:  [98 °F (36.7 °C)-98.6 °F (37 °C)] 98.6 °F (37 °C)  Pulse:  [71-88] 76  Resp:  [16-19] 18  SpO2:  [94 %-97 %] 96 %  BP: (121-155)/(56-72) 139/65     Weight: 126.2 kg (278 lb 3.2 oz) (08/17/18 0400)  Body mass index is 35.72 kg/m².  Body surface area is 2.57 meters squared.    I/O last 3 completed shifts:  In: 655 [P.O.:655]  Out: 0     Physical Exam   Constitutional: He appears well-developed and well-nourished. No distress.   HENT:   Head: Normocephalic and atraumatic.   Eyes: Conjunctivae are normal. No scleral icterus.   Cardiovascular: Normal rate, regular rhythm and normal heart sounds. Exam reveals no gallop and no friction rub.   No murmur heard.  Pulmonary/Chest: Effort normal and breath sounds normal. No respiratory distress. He has no wheezes. He has no rales.   Abdominal: Soft. Bowel sounds are normal. He exhibits no distension. There is no tenderness.   Musculoskeletal: Normal range of motion. He exhibits edema.   Skin: Skin is warm and dry. No rash noted. He is not diaphoretic. No erythema. No pallor.       Significant Labs:  CBC:   Recent Labs   Lab  08/17/18 0615   WBC  3.13*   RBC  2.70*   HGB  8.7*   HCT  27.4*   PLT  112*   MCV  102*   MCH  32.2*   MCHC  31.8*     CMP:   Recent Labs   Lab  08/17/18 0615   GLU  86   CALCIUM  8.8   ALBUMIN  2.6*   PROT  4.8*   NA  138   K  4.8   CO2  25   CL  104   BUN  61*   CREATININE  2.9*   ALKPHOS  55   ALT  11   AST  9*   BILITOT  0.5

## 2018-08-17 NOTE — PLAN OF CARE
[facility] Facility Transfer Orders        Admit to: rehab    Diagnoses:   Active Hospital Problems    Diagnosis  POA    *Liver transplanted [Z94.4]  Not Applicable    Neutropenia [D70.9]  No    Generalized edema [R60.1]  Yes    At risk for opportunistic infections [Z91.89]  No    Prophylactic immunotherapy [Z29.8]  Not Applicable    Long-term use of immunosuppressant medication [Z79.899]  Not Applicable    Steroid-induced hyperglycemia [R73.9, T38.0X5A]  No    Nausea and vomiting [R11.2]  Unknown     --appears to occur with meals  --recommend abdo xray and ct scan with po contrast  --trial of reglan with meals      CKD (chronic kidney disease), stage III [N18.3]  Yes    Stage II pressure ulcer of sacral region [L89.152]  Yes    Severe protein-calorie malnutrition [E43]  Yes    Physical deconditioning [R53.81]  Yes    Alteration in skin integrity [R23.9]  Yes    Acute kidney injury superimposed on chronic kidney disease [N17.9, N18.9]  Yes    Thrombocytopenia [D69.6]  Yes    Anemia of chronic disease [D63.8]  Yes    Seizures [R56.9]  Yes    Bilateral edema of lower extremity [R60.0]  Yes      Resolved Hospital Problems    Diagnosis Date Resolved POA    Hyperkalemia [E87.5] 08/14/2018 No    Bradycardia [R00.1] 08/14/2018 No    Acute venous stasis dermatitis of both legs [I87.2] 08/10/2018 Yes    Sinus bradycardia [R00.1] 08/14/2018 No    Positive blood culture in cadaveric donor [Z00.5, R78.81] 08/07/2018 Not Applicable    Organ transplant candidate [Z76.82] 07/30/2018 Not Applicable    Hypothermia [T68.XXXA] 07/30/2018 No    Hypernatremia [E87.0] 07/17/2018 No    Elevated troponin [R74.8] 07/03/2018 No    Gram-positive bacteremia [R78.81] 07/03/2018 Yes    Cellulitis of both lower extremities [L03.115, L03.116] 07/03/2018 No    Xerosis of skin [L85.3] 07/03/2018 Yes    Coagulopathy [D68.9] 07/31/2018 Yes    Cholestatic pruritus [L29.8] 07/31/2018 Yes    Acidosis  [E87.2] 07/30/2018 Yes    Pancytopenia [D61.818] 08/14/2018 Yes    Decompensated hepatic cirrhosis [K72.90] 08/14/2018 Unknown    Hepatic encephalopathy [K72.90] 08/14/2018 Unknown     Chronic     Allergies:   Review of patient's allergies indicates:   Allergen Reactions    Nsaids (non-steroidal anti-inflammatory drug)      D/t to liver disease.    Penicillins Other (See Comments)     Tolerated pip-tazo in 8/2016 without issue  Tolerated cefepime 7/2018 without issue  Since childhood was told not to take it       Code Status: full    Vitals: Routine       Diet: regular diet    Activity: Activity as tolerated    Nursing Precautions: Fall and Pressure ulcer prevention    Bed/Surface: Pressure Redistribution    Consults: PT to evaluate and treat- 7 times a week, OT to evaluate and treat- 7 times a week and Wound Care    Oxygen: room air    Dialysis: Patient is not on dialysis.     Labs: First blood draw on Monday. Monday and Thursday labs, CBC, CMP, Prograf levels   Pending Diagnostic Studies:     Procedure Component Value Units Date/Time    Basic metabolic panel [[id number]] Collected:  07/26/18 1437    Order Status:  Sent Lab Status:  In process Updated:  07/26/18 1438    Specimen:  Blood     Basic metabolic panel [[id number]] Collected:  07/26/18 1437    Order Status:  Sent Lab Status:  In process Updated:  07/26/18 1437    Specimen:  Blood     Basic metabolic panel [[id number]] Collected:  07/23/18 1438    Order Status:  Sent Lab Status:  No result     Specimen:  Blood     Basic metabolic panel [[id number]] Collected:  07/18/18 2133    Order Status:  Sent Lab Status:  In process Updated:  07/18/18 2134    Specimen:  Blood     Basic metabolic panel [[id number]] Collected:  07/14/18 2254    Order Status:  Sent Lab Status:  In process Updated:  07/14/18 2254    Specimen:  Blood     Freeze and Hold -BB HEP [[id number]] Collected:  07/23/18 1328    Order Status:  Sent Lab Status:  No result     Specimen:  Blood      Magnesium [[id number]] Collected:  07/28/18 1410    Order Status:  Sent Lab Status:  In process Updated:  07/28/18 1412    Specimen:  Blood     Magnesium [[id number]] Collected:  07/26/18 1437    Order Status:  Sent Lab Status:  In process Updated:  07/26/18 1437    Specimen:  Blood     Potassium [[id number]] Collected:  07/14/18 1343    Order Status:  Sent Lab Status:  In process Updated:  07/14/18 1343    Specimen:  Blood     Potassium - if not ordered in last 24 hours [[id number]] Collected:  07/23/18 1427    Order Status:  Sent Lab Status:  In process Updated:  07/23/18 1427    Specimen:  Blood     Renal function panel [[id number]] Collected:  07/28/18 1410    Order Status:  Sent Lab Status:  In process Updated:  07/28/18 1412    Specimen:  Blood     Renal function panel [[id number]] Collected:  07/26/18 1437    Order Status:  Sent Lab Status:  In process Updated:  07/26/18 1437    Specimen:  Blood     Urinalysis [[id number]] Collected:  07/13/18 0714    Order Status:  Sent Lab Status:  In process Updated:  07/13/18 0714    Specimen:  Urine         Imaging: none    Miscellaneous Care:   Wound Care: yes:  Pressure Ulcer(s) Stage I :   Location: sacral  Apply Miconzazole:  Barrier ointment                       Frequency:  Twice Daily                             If incontinent of stool or urine, apply thin layer Barrier cream                   twice daily and PRN to wound         Pressure relief measure:  for pressure redistribution and A/C Boots        Apply Sween moisturizer to dry skin on extremities BID    IV Access: Peripheral     Medications: Discontinue all previous medication orders, if any. See new list below.  Discharge Medication List as of 8/17/2018  2:01 PM      START taking these medications    Details   acyclovir (ZOVIRAX) 200 MG capsule Take 2 capsules (400 mg total) by mouth 2 (two) times daily. STOP 10/22/18, Starting Tue 7/24/2018, Until Mon 10/22/2018, Normal      atovaquone (MEPRON) 750 mg/5  mL Susp Take 10 mLs (1,500 mg total) by mouth once daily., Starting Sat 8/18/2018, Until Wed 1/23/2019, Normal      ergocalciferol (ERGOCALCIFEROL) 50,000 unit Cap Take 1 capsule (50,000 Units total) by mouth every 7 days for 10 doses, Starting Thu 8/23/2018, Until Fri 10/26/2018, Normal      miconazole nitrate 2% (MICOTIN) 2 % Oint Apply topically 2 (two) times daily., Starting Fri 8/17/2018, OTC      predniSONE (DELTASONE) 10 MG tablet Take 20mg by mouth once daily from 7/29-8/4; Take 15mg daily from 8/5-8/11; Take 10mg  daily from 8/12-8/18; Take 5mg daily from 8/19-8/25;  Take 2.5mg daily from 8/26-9/1; STOP 9/2, Normal         CONTINUE these medications which have CHANGED    Details   atorvastatin (LIPITOR) 40 MG tablet Take 1 tablet (40 mg total) by mouth once daily., Starting Fri 8/17/2018, Normal      docusate sodium (COLACE) 100 MG capsule Take 1 capsule (100 mg total) by mouth 3 (three) times daily as needed for Constipation., Starting Wed 7/25/2018, OTC      levETIRAcetam (KEPPRA) 500 MG Tab Take 1 tablet (500 mg total) by mouth 2 (two) times daily., Starting Fri 8/17/2018, Normal      magnesium oxide (MAG-OX) 400 mg tablet Take 2 tablets (800 mg total) by mouth 3 (three) times daily., Starting Fri 8/17/2018, Normal      mycophenolate (CELLCEPT) 250 mg Cap Take 1 capsule (250 mg total) by mouth 2 (two) times daily., Starting Fri 8/17/2018, Normal      pantoprazole (PROTONIX) 40 MG tablet Take 1 tablet (40 mg total) by mouth 2 (two) times daily before meals., Starting Fri 8/17/2018, Until Sat 8/17/2019, Normal      tacrolimus (PROGRAF) 1 MG Cap Take 3 capsules (3 mg total) by mouth every 12 (twelve) hours., Starting Fri 8/17/2018, Until Sat 8/17/2019, Normal         CONTINUE these medications which have NOT CHANGED    Details   aspirin (ECOTRIN) 81 MG EC tablet Take 1 tablet (81 mg total) by mouth once daily., Starting Sat 1/27/2018, Until Sun 1/27/2019, Normal      fish oil-omega-3 fatty acids 300-1,000  mg capsule Take 2 capsules (2 g total) by mouth once daily., Starting 11/25/2015, Until Discontinued, OTC      furosemide (LASIX) 80 MG tablet Take 1 tablet (80 mg total) by mouth 2 (two) times daily., Starting Fri 3/23/2018, No Print      ondansetron (ZOFRAN) 4 MG tablet Take 1 tablet (4 mg total) by mouth every 8 (eight) hours as needed for Nausea., Starting Fri 12/15/2017, Normal         STOP taking these medications       ascorbic acid (VITAMIN C) 1000 MG tablet Comments:   Reason for Stopping:         ferrous gluconate 270 mg (27 mg iron) Tab Comments:   Reason for Stopping:         GENERLAC 10 gram/15 mL solution Comments:   Reason for Stopping:         rifAXIMin (XIFAXAN) 550 mg Tab Comments:   Reason for Stopping:         sertraline (ZOLOFT) 50 MG tablet Comments:   Reason for Stopping:         sodium bicarbonate 650 MG tablet Comments:   Reason for Stopping:         spironolactone (ALDACTONE) 100 MG tablet Comments:   Reason for Stopping:         sulfamethoxazole-trimethoprim 400-80mg (BACTRIM,SEPTRA) 400-80 mg per tablet Comments:   Reason for Stopping:         tramadol (ULTRAM) 50 mg tablet Comments:   Reason for Stopping:             Follow up: Transplant clinic Tuesday 8/21

## 2018-08-17 NOTE — NURSING
Pt is AAOx4, 2-3 person assist, and VSS. Pt c/o of bilateral shoulder pain-managed with scheduled Tylenol. Pt had 1 BM.  Telemetry monitoring in place. Blood glucose monitoring performed and treated as ordered. Midline removed prior to transfer. Pt's mother at bedside. Pt was transferred via wheelchair to rehab.

## 2018-08-17 NOTE — ASSESSMENT & PLAN NOTE
ATN on CKD    Continues with good urine output, although not recorded  Still edematous LE b/l    -Plan for discharge today  -Rec transition to Lasix 40 mg po BID   -Rec low Na diet (2g)

## 2018-08-20 ENCOUNTER — TELEPHONE (OUTPATIENT)
Dept: TRANSPLANT | Facility: CLINIC | Age: 63
End: 2018-08-20

## 2018-08-20 LAB
EXT ALBUMIN: 2.5
EXT ALKALINE PHOSPHATASE: 51
EXT ALT: 10
EXT AST: 12
EXT BILIRUBIN TOTAL: 0.5
EXT BUN: 49
EXT CALCIUM: 8.8
EXT CHLORIDE: 102
EXT CO2: 26
EXT CREATININE: 2.2 MG/DL
EXT EOSINOPHIL%: 5.1
EXT GLUCOSE: 98
EXT HEMATOCRIT: 27.6
EXT HEMOGLOBIN: 8.9
EXT LYMPH%: 11.6
EXT MAGNESIUM: 1.1
EXT MONOCYTES%: 22.5
EXT PLATELETS: 113
EXT POTASSIUM: 5.1
EXT PREALBUMIN: 17
EXT PROTEIN TOTAL: 4.8
EXT SEGS%: 58.6
EXT SODIUM: 138 MMOL/L
EXT TACROLIMUS LVL: 7.8
EXT WBC: 2.8

## 2018-08-20 NOTE — PROGRESS NOTES
DISCHARGE NOTE:    Alon Adamson is a 63 y.o. 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      Past Medical History:   Diagnosis Date    Anticoagulant long-term use     Arthritis     Back pain     Cellulitis     Cirrhosis     Coronary artery disease     DM (diabetes mellitus)     Hearing loss     right ear    Hepatic encephalopathy     Hypertension     diagnosed today (2015) and prescribed toprol    Leg edema     Nephrolithiasis     JACK (obstructive sleep apnea)     Seizures     Splenomegaly        Hospital Course: Pt admitted  for for complications due to ESLD.  Underwent liver transplant during the same hospitalization on 28.       Due to significant rehab needs,was transferred to the Rehab facility.  To be seen in clinic by Transplant Pharm.D. Upon discharge,        Allergies:   Review of patient's allergies indicates:   Allergen Reactions    Nsaids (non-steroidal anti-inflammatory drug)      D/t to liver disease.    Penicillins Other (See Comments)     Tolerated pip-tazo in 2016 without issue  Tolerated cefepime 2018 without issue  Since childhood was told not to take it       Patient Pharmacy: [facility]    Discharge Medications:   Alon Adamson   Home Medication Instructions CATHERINE:[id number]    Printed on:18 2499   Medication Information                      acyclovir (ZOVIRAX) 200 MG capsule  Take 2 capsules (400 mg total) by mouth 2 (two) times daily. STOP 10/22/18     aspirin (ECOTRIN) 81 MG EC tablet  Take 1 tablet (81 mg total) by mouth once daily.     atorvastatin (LIPITOR) 40 MG tablet  Take 1 tablet (40 mg total) by mouth once daily.     atovaquone (MEPRON) 750 mg/5 mL Susp  Take 10 mLs (1,500 mg total) by mouth once daily.     docusate sodium (COLACE) 100 MG capsule  Take 1 capsule (100 mg total) by mouth 3 (three) times daily as needed for Constipation.     ergocalciferol  (ERGOCALCIFEROL) 50,000 unit Cap  Take 1 capsule (50,000 Units total) by mouth every 7 days for 10 doses     fish oil-omega-3 fatty acids 300-1,000 mg capsule  Take 2 capsules (2 g total) by mouth once daily.     furosemide (LASIX) 80 MG tablet  Take 1 tablet (80 mg total) by mouth 2 (two) times daily.     levETIRAcetam (KEPPRA) 500 MG Tab  Take 1 tablet (500 mg total) by mouth 2 (two) times daily.     magnesium oxide (MAG-OX) 400 mg tablet  Take 2 tablets (800 mg total) by mouth 3 (three) times daily.     miconazole nitrate 2% (MICOTIN) 2 % Oint  Apply topically 2 (two) times daily.     mycophenolate (CELLCEPT) 250 mg Cap  Take 1 capsule (250 mg total) by mouth 2 (two) times daily.     ondansetron (ZOFRAN) 4 MG tablet  Take 1 tablet (4 mg total) by mouth every 8 (eight) hours as needed for Nausea.     pantoprazole (PROTONIX) 40 MG tablet  Take 1 tablet (40 mg total) by mouth 2 (two) times daily before meals.     predniSONE (DELTASONE) 10 MG tablet  Take 20mg by mouth once daily from 7/29-8/4; Take 15mg daily from 8/5-8/11; Take 10mg  daily from 8/12-8/18; Take 5mg daily from 8/19-8/25;  Take 2.5mg daily from 8/26-9/1; STOP 9/2     tacrolimus (PROGRAF) 1 MG Cap  Take 3 capsules (3 mg total) by mouth every 12 (twelve) hours.         Pharmacy Interventions/Recommendations:  1) Transplant Immunosuppression: Tacrolimus, Cellcept, Prednisone (until 9/2)    2) Opportunistic Infection prophylaxis: Acyclovir x 3 months, atovaquone until 6 months post-transplant (hyperkalemia due to Bactrim, anemia so avoided dapsone), fluconazole (COMPLETE)    3) Patient Counseling/Education:  Demonstrated the use of the BP cuff, thermometer.    4) Follow-Up/Discharge Needs:  Will need to see the pharmacist in clinic at d/c from rehab    5) Patient received prescriptions for:     Did not receive any medications at [place] will see pharmacist in clinic     6) Patient Assistance Information: none    7) The following medications have been placed on  HOLD and should be restarted in the outpatient setting (when appropriate): none    Alon and his caregiver verbalized their understanding and had the opportunity to ask questions.

## 2018-08-20 NOTE — PT/OT/SLP DISCHARGE
Occupational Therapy Discharge Summary    Alon Adamson  MRN: [id number]   Principal Problem: Liver transplanted      Patient Discharged from acute Occupational Therapy on 8/17/18.  Please refer to prior OT note dated 8/16/181 for functional status.    Assessment:      Patient appropriate for care in another setting.    Objective:     GOALS:   Multidisciplinary Problems     Occupational Therapy Goals     Not on file          Multidisciplinary Problems (Resolved)        Problem: Occupational Therapy Goal    Goal Priority Disciplines Outcome Interventions   Occupational Therapy Goal   (Resolved)     OT, PT/OT Outcome(s) achieved    Description:  Goals to be met by: 8/27/18    Patient will increase functional independence with ADLs by performing:     Upper body dressing while seated EOB with SBA  Grooming while standing at sink with Stand-by Assistance  Toileting from bedside commode with Minimal Assistance for hygiene and clothing management.  Toilet transfer to bedside commode with Contact Guard Assistance.  Pt will complete a functional static standing activity x ~4 minutes with CGA.   LB Dressing with Moderate Assistance                             Reasons for Discontinuation of Therapy Services  Transfer to alternate level of care.      Plan:     Patient Discharged to: Inpatient Rehab    Aida buckley OT  8/20/2018

## 2018-08-20 NOTE — PHYSICIAN QUERY
PT Name: Alon Adamson  MR #: [id number]    Physician Query Form - Consultant Diagnosis Clarification     CDS/: Danielle Calderon RN, CDI              Contact information: [phone]  This form is a permanent document in the medical record.     Query Date: August 20, 2018      By submitting this query, we are merely seeking further clarification of documentation.  Please utilize your independent clinical judgment when addressing the question(s) below.      The Medical record contains the following:   Diagnosis Supporting Clinical Information Location in Medical Record       ATN      Acute kidney injury superimposed on chronic kidney disease    ATN on CKD   Continues with good urine output, although not recorded   Still edematous LE b/l               BUN=           17---> 34----> 38-->    41---> 58  -> 60-->61                  .  Creatinine=  1.3--> 2.5---> 3.4----> 3.9--> 3.8-> 3.4--> 2.9    GFR=           58.5->26.5-->18.3-->15.5-->16->  18.3->22.2       Progress Note 8/9-  8/17       Nephrology                            Lab 7/29, 7/31, 8/3, 8/7,8/12,8/15, 8/17         Do you agree with the Consultants diagnosis of _________ATN __________________________?                 [  x ]   Yes                         [   ]   No                  [   ]   Clinically insignificant                 [   ]   Clinically undetermined                 [   ]   Other/Clarification of findings: ___________________________________________

## 2018-08-21 ENCOUNTER — OFFICE VISIT (OUTPATIENT)
Dept: TRANSPLANT | Facility: CLINIC | Age: 63
End: 2018-08-21
Payer: MEDICARE

## 2018-08-21 VITALS
BODY MASS INDEX: 35.16 KG/M2 | OXYGEN SATURATION: 98 % | SYSTOLIC BLOOD PRESSURE: 129 MMHG | HEIGHT: 74 IN | DIASTOLIC BLOOD PRESSURE: 72 MMHG | TEMPERATURE: 98 F | HEART RATE: 99 BPM | WEIGHT: 274 LBS | RESPIRATION RATE: 18 BRPM

## 2018-08-21 DIAGNOSIS — R21 RASH AND NONSPECIFIC SKIN ERUPTION: Primary | ICD-10-CM

## 2018-08-21 DIAGNOSIS — Z51.81 ENCOUNTER FOR THERAPEUTIC DRUG MONITORING: Primary | ICD-10-CM

## 2018-08-21 DIAGNOSIS — Z94.4 LIVER REPLACED BY TRANSPLANT: ICD-10-CM

## 2018-08-21 PROCEDURE — 99213 OFFICE O/P EST LOW 20 MIN: CPT | Mod: 24,S$PBB,, | Performed by: SURGERY

## 2018-08-21 PROCEDURE — 99213 OFFICE O/P EST LOW 20 MIN: CPT | Mod: PBBFAC

## 2018-08-21 PROCEDURE — 99999 PR PBB SHADOW E&M-EST. PATIENT-LVL III: CPT | Mod: PBBFAC,,,

## 2018-08-21 NOTE — PROGRESS NOTES
Transplant Surgery  Liver Transplant Recipient Follow-up    Original Referring Physician: Bruno Scott  Current Corresponding Physician: Mckinley Rivera    Chief Complaint: Alon is here for follow up of his liver transplant performed 2018 for the primary diagnosis (UNOS) of Alcoholic Cirrhosis    ORGAN: LIVER  Whole or Partial: whole liver  Donor Type:  - brain death  PHS Increased Risk: no  Donor CMV Status: Negative  Donor HCV Status: Negative  Donor HBcAb: Negative  Donor HBV TAMAR: Negative  Donor HCV TAMAR: Negative    Biliary Anastomosis: end to end  Arterial Anatomy: standard  IVC reconstruction: end to end ivc  Portal vein status: partial patent    Subjective:     History of Present Illness: He has had the following complications since transplant: renal insufficiency.  The noted complications are well controlled.    Interval History: Currently, he is doing well.  Current complaints include skin rash at wrists bilateraly.  Alon is here for management of his immunosuppression medication.    Review of Systems   Constitutional: Positive for fatigue.   HENT: Negative for drooling, postnasal drip and sore throat.    Eyes: Negative for discharge and itching.   Respiratory: Negative for choking and stridor.    Gastrointestinal: Negative for rectal pain.   Endocrine: Negative for polydipsia.   Genitourinary: Negative for enuresis and genital sores.   Musculoskeletal: Negative for back pain, neck pain and neck stiffness.   Allergic/Immunologic: Positive for immunocompromised state.   Neurological: Negative for facial asymmetry and numbness.   Hematological: Negative for adenopathy.   Psychiatric/Behavioral: Negative for behavioral problems, self-injury and suicidal ideas.       Objective:     Physical Exam   Abdominal:       Wound healing, staples out     Lab Results   Component Value Date    BILITOT 0.5 2018    AST 9 (L) 2018    ALT 11 2018    ALKPHOS 55  08/17/2018    CREATININE 2.9 (H) 08/17/2018    ALBUMIN 2.6 (L) 08/17/2018     Lab Results   Component Value Date    WBC 3.13 (L) 08/17/2018    HGB 8.7 (L) 08/17/2018    HCT 27.4 (L) 08/17/2018    HCT 25 (L) 07/23/2018     (L) 08/17/2018     Lab Results   Component Value Date    TACROLIMUS 6.8 08/17/2018       Assessment/Plan:          · S/P liver transplant.  · Chronic immunosuppressive medications for rejection prophylaxis at target.  Plan: no adjustment needed.  · Continue monitoring symptoms, labs and drug levels for drug-related toxicity and side effects.  · Incision: staples out, wound well-healed, no evidence of hernia  · Femoral arterial line site: no complications evident   · Will arrange for dermatology to see wrist rash MD TERRA Stanford Patient Status  Functional Status: 90% - Able to carry on normal activity: minor symptoms of disease  Physical Capacity: Wheelchair bound or more limited

## 2018-08-22 ENCOUNTER — TELEPHONE (OUTPATIENT)
Dept: TRANSPLANT | Facility: CLINIC | Age: 63
End: 2018-08-22

## 2018-08-22 ENCOUNTER — HOSPITAL ENCOUNTER (OUTPATIENT)
Dept: RADIOLOGY | Facility: HOSPITAL | Age: 63
Discharge: HOME | End: 2018-08-22
Attending: PHYSICAL MEDICINE & REHABILITATION
Payer: MEDICARE

## 2018-08-22 PROCEDURE — 71045 X-RAY EXAM CHEST 1 VIEW: CPT | Mod: 26,,, | Performed by: RADIOLOGY

## 2018-08-22 PROCEDURE — 74018 RADEX ABDOMEN 1 VIEW: CPT | Mod: 26,,, | Performed by: RADIOLOGY

## 2018-08-22 NOTE — TELEPHONE ENCOUNTER
"Received call from  at [facility]. Reported patient c/o nausea with distended abdomen (soft), passing gas, but no "good BM".  She reported she ordered KUB xry. And it is suspicious for small bowel obstruction or ileus; with a Lt. Kidney stone noted.  Patient voiding normally and feels fine. She is making patient NPO with "Gentle Hydration" , she said.  She will follow up labs tomorrow.   She wanted to report this to transplant team in case of any other recommendations.   Feels she can treat patient there without a problem, she said.   Reviewed all above information and read KUB report to  by phone. TORB : Current management by  is fine , no other recommendations to make , will check up on patient tomorrow for follow up.  Notified  of 's review.      "

## 2018-08-23 ENCOUNTER — TELEPHONE (OUTPATIENT)
Dept: DERMATOLOGY | Facility: CLINIC | Age: 63
End: 2018-08-23

## 2018-08-23 ENCOUNTER — HOSPITAL ENCOUNTER (OUTPATIENT)
Dept: RADIOLOGY | Facility: HOSPITAL | Age: 63
Discharge: HOME | End: 2018-08-23
Attending: PHYSICAL MEDICINE & REHABILITATION
Payer: MEDICARE

## 2018-08-23 ENCOUNTER — TELEPHONE (OUTPATIENT)
Dept: TRANSPLANT | Facility: CLINIC | Age: 63
End: 2018-08-23

## 2018-08-23 LAB
EXT ALBUMIN: 2.5
EXT ALKALINE PHOSPHATASE: 56
EXT ALT: 8
EXT AST: 14
EXT BILIRUBIN TOTAL: 0.8
EXT BUN: 45
EXT CALCIUM: 8.9
EXT CHLORIDE: 100
EXT CO2: 30
EXT CREATININE: 2.3 MG/DL
EXT EOSINOPHIL%: 4
EXT GFR MDRD NON AF AMER: 29.3
EXT GLUCOSE: 83
EXT HEMATOCRIT: 27.5
EXT HEMOGLOBIN: 9.1
EXT LYMPH%: 12.5
EXT MAGNESIUM: 1.4
EXT MONOCYTES%: 22.8
EXT PLATELETS: 86
EXT POTASSIUM: 4.7
EXT PROTEIN TOTAL: 4.9
EXT SEGS%: 58
EXT SODIUM: 138 MMOL/L
EXT TACROLIMUS LVL: 7.2
EXT WBC: 2.24

## 2018-08-23 NOTE — TELEPHONE ENCOUNTER
----- Message from Silvano Salmeron MD sent at 8/23/2018  1:47 PM CDT -----  Results ok. No action needed

## 2018-08-23 NOTE — TELEPHONE ENCOUNTER
Labs reviewed by  , no changes made. Repeat labs on 8/27/18(Monday) as planned.  Left VM message for  ([facility]) to follow up on how patient is doing today , awaiting response:  was informed.

## 2018-08-23 NOTE — TELEPHONE ENCOUNTER
Spoke with Ms. Reid about pt's appt. Pt is being released from rehab on 8/31. Scheduled pt to see Dr. Rosado on September 11th at 11:15am.  ----- Message from Erika Aragon sent at 8/23/2018 12:52 PM CDT -----  Contact: Jeanette/Traci at [phone]  Patient Returning Call from [facility]    Who Left Message for Patient:Nora  Communication Preference:call  Additional Information:Just missed your call

## 2018-08-23 NOTE — TELEPHONE ENCOUNTER
Dieudonne Reid call to reschedule pt appointment. No answer. Left message.    ----- Message from Erika Aragon sent at 8/23/2018  9:49 AM CDT -----  Contact: Jeanette in Rehab at [phone]  Cool ptNeeds Advice    Reason for call:   changing pts appt on Aug. 28/Liver tx pt/not available until after Aug.31   Communication Preference:call  Additional Information:

## 2018-08-23 NOTE — TELEPHONE ENCOUNTER
"Received return call from , she reported patient is stable and looking better today, stated he had a BM last evening and "belly is soft" , passing gas. She will continue IV fluids and NPO status today and repeat KUB tomorrow, if better will resume PO diet then, she said.     "

## 2018-08-24 ENCOUNTER — HOSPITAL ENCOUNTER (OUTPATIENT)
Dept: RADIOLOGY | Facility: HOSPITAL | Age: 63
Discharge: HOME | End: 2018-08-24
Attending: PHYSICAL MEDICINE & REHABILITATION
Payer: MEDICARE

## 2018-08-24 PROCEDURE — 74018 RADEX ABDOMEN 1 VIEW: CPT | Mod: 26,,, | Performed by: RADIOLOGY

## 2018-08-27 ENCOUNTER — TELEPHONE (OUTPATIENT)
Dept: TRANSPLANT | Facility: CLINIC | Age: 63
End: 2018-08-27

## 2018-08-27 LAB
EXT ALBUMIN: 2.5
EXT ALKALINE PHOSPHATASE: 66
EXT ALT: 7
EXT AST: 10
EXT BILIRUBIN TOTAL: 0.7
EXT BUN: 37
EXT CALCIUM: 8.6
EXT CHLORIDE: 102
EXT CO2: 26
EXT CREATININE: 2.4 MG/DL
EXT EOSINOPHIL%: 2.8
EXT GLUCOSE: 100
EXT HEMATOCRIT: 27
EXT HEMOGLOBIN: 8.7
EXT LYMPH%: 12.8
EXT MAGNESIUM: 1.6
EXT MONOCYTES%: 19.9
EXT PLATELETS: 86
EXT POTASSIUM: 4.3
EXT PROTEIN TOTAL: 4.9
EXT SEGS%: 63
EXT SODIUM: 138 MMOL/L
EXT TACROLIMUS LVL: 8.3
EXT WBC: 2.8

## 2018-08-27 NOTE — TELEPHONE ENCOUNTER
"Not[facility]: to reduce Prograf dose to 2mg twice daily per OLEKSANDR Reagan; repeat labs due 8/30/18. She was able to repeat instructions and stated she will take care of it.   "

## 2018-08-28 ENCOUNTER — TELEPHONE (OUTPATIENT)
Dept: TRANSPLANT | Facility: CLINIC | Age: 63
End: 2018-08-28

## 2018-08-28 LAB — FUNGUS SPEC CULT: NORMAL

## 2018-08-28 RX ORDER — TACROLIMUS 1 MG/1
2 CAPSULE ORAL EVERY 12 HOURS
Qty: 120 CAPSULE | Refills: 11
Start: 2018-08-28 | End: 2018-09-07

## 2018-08-28 NOTE — TELEPHONE ENCOUNTER
----- Message from Umm Robledo sent at 8/28/2018  3:26 PM CDT -----  Contact:  (Rehab)  Needs Advice    Reason for call:    Asks that Helen MONTESINOS reaches back out to her regarding pt.  Communication Preference:  Cell is [phone]  Additional Information: n/a

## 2018-08-29 ENCOUNTER — HOSPITAL ENCOUNTER (OUTPATIENT)
Dept: RADIOLOGY | Facility: HOSPITAL | Age: 63
Discharge: HOME | End: 2018-08-29
Attending: PHYSICAL MEDICINE & REHABILITATION
Payer: MEDICARE

## 2018-08-29 ENCOUNTER — TELEPHONE (OUTPATIENT)
Dept: TRANSPLANT | Facility: CLINIC | Age: 63
End: 2018-08-29

## 2018-08-29 NOTE — TELEPHONE ENCOUNTER
LEAH contacted patient [phone] to check in to see how patient was coming along. Patient reported that he may be discharging on 9/7/18. Patient requested [place] [place] apartment that is handicap accessible. Patient stated that he feels that it may be necessary if his strength does not improve as well as he would like by discharge. Patient will follow up as appropriate with [facility]. SW remains available.

## 2018-08-30 ENCOUNTER — TELEPHONE (OUTPATIENT)
Dept: TRANSPLANT | Facility: CLINIC | Age: 63
End: 2018-08-30

## 2018-08-30 LAB
EXT ALBUMIN: 2.4
EXT ALKALINE PHOSPHATASE: 61
EXT ALT: <5
EXT AST: 8
EXT BILIRUBIN TOTAL: 0.6
EXT BUN: 41
EXT CALCIUM: 8.8
EXT CHLORIDE: 101
EXT CO2: 28
EXT CREATININE: 2.2 MG/DL
EXT EOSINOPHIL%: 3.7
EXT GLUCOSE: 96
EXT HEMATOCRIT: 25
EXT HEMOGLOBIN: 7.9
EXT LYMPH%: 10.5
EXT MAGNESIUM: 1.5
EXT MONOCYTES%: 17.5
EXT PLATELETS: 102
EXT POTASSIUM: 4.6
EXT PROTEIN TOTAL: 5
EXT SEGS%: 66.2
EXT SODIUM: 137 MMOL/L
EXT TACROLIMUS LVL: 4.9
EXT WBC: 3.25

## 2018-08-30 NOTE — TELEPHONE ENCOUNTER
----- Message from Radha Black RN sent at 8/30/2018  4:30 PM CDT -----  Contact: [facility]      ----- Message -----  From: Umm Robledo  Sent: 8/30/2018   4:19 PM  To: [facility] Post-Liver Transplant Non-Clinical    Needs Advice    Reason for call:    confirm appt location. I advise nurse the location was at [facility], but would like a courtesy call.   Communication Preference: Jeanette [phone]  Additional Information: n/a

## 2018-08-30 NOTE — TELEPHONE ENCOUNTER
Spoke to Jeanette at Rehab unit; re: xrays ordered: our office did not request those xrays.  She reported that she will investigate this further.   Also informed Jeanette that 's labs were reviewed by  and were ok with no medicine changes made. Repeat labs due 9/4/18. She was able to voice understanding, and stated she will let  know this.

## 2018-08-30 NOTE — TELEPHONE ENCOUNTER
----- Message from Silvano Salmeron MD sent at 8/30/2018  1:52 PM CDT -----  Results ok. No action needed

## 2018-08-31 ENCOUNTER — HOSPITAL ENCOUNTER (OUTPATIENT)
Dept: RADIOLOGY | Facility: HOSPITAL | Age: 63
Discharge: HOME | End: 2018-08-31
Attending: PHYSICAL MEDICINE & REHABILITATION
Payer: MEDICARE

## 2018-08-31 PROCEDURE — 73030 X-RAY EXAM OF SHOULDER: CPT | Mod: 26,50,, | Performed by: RADIOLOGY

## 2018-08-31 PROCEDURE — 72100 X-RAY EXAM L-S SPINE 2/3 VWS: CPT | Mod: 26,,, | Performed by: RADIOLOGY

## 2018-09-04 ENCOUNTER — TELEPHONE (OUTPATIENT)
Dept: TRANSPLANT | Facility: CLINIC | Age: 63
End: 2018-09-04

## 2018-09-04 ENCOUNTER — OFFICE VISIT (OUTPATIENT)
Dept: TRANSPLANT | Facility: CLINIC | Age: 63
End: 2018-09-04
Payer: MEDICARE

## 2018-09-04 VITALS
SYSTOLIC BLOOD PRESSURE: 121 MMHG | OXYGEN SATURATION: 96 % | BODY MASS INDEX: 28.12 KG/M2 | WEIGHT: 219.13 LBS | DIASTOLIC BLOOD PRESSURE: 78 MMHG | HEIGHT: 74 IN | RESPIRATION RATE: 18 BRPM | HEART RATE: 116 BPM

## 2018-09-04 DIAGNOSIS — Z94.4 LIVER REPLACED BY TRANSPLANT: ICD-10-CM

## 2018-09-04 DIAGNOSIS — Z51.81 ENCOUNTER FOR THERAPEUTIC DRUG MONITORING: Primary | ICD-10-CM

## 2018-09-04 LAB
EXT ALBUMIN: 2.8
EXT ALKALINE PHOSPHATASE: 66
EXT ALT: 9
EXT AST: 20
EXT BILIRUBIN TOTAL: 0.9
EXT BUN: 32
EXT CALCIUM: 9.5
EXT CHLORIDE: 99
EXT CO2: 24
EXT CREATININE: 2 MG/DL
EXT EOSINOPHIL%: 7.8
EXT GLUCOSE: 126
EXT HEMATOCRIT: 29.1
EXT HEMOGLOBIN: 9.5
EXT LYMPH%: 10.1
EXT MAGNESIUM: 2
EXT MONOCYTES%: 15.8
EXT PLATELETS: 160
EXT POTASSIUM: 5.6
EXT PROTEIN TOTAL: 6.2
EXT SEGS%: 60.3
EXT SODIUM: 135 MMOL/L
EXT TACROLIMUS LVL: 4.8
EXT WBC: 5.6

## 2018-09-04 PROCEDURE — 99213 OFFICE O/P EST LOW 20 MIN: CPT | Mod: 24,S$PBB,, | Performed by: SURGERY

## 2018-09-04 PROCEDURE — 99213 OFFICE O/P EST LOW 20 MIN: CPT | Mod: PBBFAC

## 2018-09-04 PROCEDURE — 99999 PR PBB SHADOW E&M-EST. PATIENT-LVL III: CPT | Mod: PBBFAC,,,

## 2018-09-04 NOTE — TELEPHONE ENCOUNTER
Left message for patient that I did not try to call him, and am unaware if  tried to call him.   Reminded patient of clinic appointment this afternoon at 1:40 PM.

## 2018-09-04 NOTE — PROGRESS NOTES
Transplant Surgery  Liver Transplant Recipient Follow-up    Original Referring Physician: Bruno Scott  Current Corresponding Physician: Mckinley Rivera    Chief Complaint: Alon is here for follow up of his liver transplant performed 2018 for the primary diagnosis (UNOS) of Alcoholic Cirrhosis    ORGAN: LIVER  Whole or Partial: whole liver  Donor Type:  - brain death  PHS Increased Risk: no  Donor CMV Status: Negative  Donor HCV Status: Negative  Donor HBcAb: Negative  Donor HBV TAMAR: Negative  Donor HCV TAMAR: Negative    Biliary Anastomosis: end to end  Arterial Anatomy: standard  IVC reconstruction: end to end ivc  Portal vein status: partial patent    Subjective:     History of Present Illness: He has had the following complications since transplant: renal insufficiency.  The noted complications is well controlled.    Interval History: Currently, he is doing adequately.  Current complaints include fatigue.  Alon is here for management of his immunosuppression medication.    Review of Systems   Constitutional: Positive for activity change and appetite change. Negative for chills and fever.   Respiratory: Negative for cough and shortness of breath.    Cardiovascular: Negative for chest pain and leg swelling.   Gastrointestinal: Negative for abdominal pain, anal bleeding, constipation, diarrhea and rectal pain.   Genitourinary: Negative for difficulty urinating and dysuria.   Musculoskeletal: Negative.    Skin: Negative for color change and rash.   Allergic/Immunologic: Positive for immunocompromised state.   Neurological: Negative for dizziness and light-headedness.   Psychiatric/Behavioral: Negative.    All other systems reviewed and are negative.      Objective:     Physical Exam   Constitutional: He is oriented to person, place, and time. No distress.   HENT:   Mouth/Throat: No oropharyngeal exudate.   Eyes: Pupils are equal, round, and reactive to light.   Neck: Neck  supple. No thyromegaly present.   Cardiovascular: Normal heart sounds and intact distal pulses.   No murmur heard.  Pulmonary/Chest: Effort normal and breath sounds normal. No respiratory distress. He has no wheezes. He has no rales. He exhibits no tenderness.   Abdominal: Soft. Bowel sounds are normal. He exhibits no distension and no mass. There is no tenderness. There is no rebound and no guarding. No hernia.       Musculoskeletal: He exhibits no edema.   Neurological: He is alert and oriented to person, place, and time.   Skin: Skin is dry. No rash noted. He is not diaphoretic.   Psychiatric: He has a normal mood and affect.   Nursing note reviewed.    Lab Results   Component Value Date    BILITOT 0.5 08/17/2018    AST 9 (L) 08/17/2018    ALT 11 08/17/2018    ALKPHOS 55 08/17/2018    CREATININE 2.9 (H) 08/17/2018    ALBUMIN 2.6 (L) 08/17/2018     Lab Results   Component Value Date    WBC 3.13 (L) 08/17/2018    HGB 8.7 (L) 08/17/2018    HCT 27.4 (L) 08/17/2018    HCT 25 (L) 07/23/2018     (L) 08/17/2018     Lab Results   Component Value Date    TACROLIMUS 6.8 08/17/2018       Assessment/Plan:          · S/P liver transplant.  · Chronic immunosuppressive medications for rejection prophylaxis at target.  Plan: no adjustment needed.  · Continue monitoring symptoms, labs and drug levels for drug-related toxicity and side effects.  · Incision: staples out, wound well-healed, no evidence of hernia  · Femoral arterial line site: no complications evident   · High K, dietary advice given.    Dwayne Mccarthy MD       [facility] Patient Status  Functional Status: 50% - Requires considerable assistance and frequent medical care  Physical Capacity: Wheelchair bound or more limited

## 2018-09-04 NOTE — TELEPHONE ENCOUNTER
LEAH spoke with pts wife Vin Adamson this am regarding patient's dc from Inpatient rehab this Friday.   LEAH has arranged for local lodging at [email or web address].  Pts wife reports she is coming to [place] on Thursday this week after putting her son on the bus.  LEAH arranged for an 11 am check in time at [email or web address] with the manager.  Pt has been assigned an [address], pts wife verbalized understanding.   LEAH notified ANDRES Mantilla nurse at [facility] as well.   LEAH remains available for all transplant resources, education and psychosocial support.

## 2018-09-04 NOTE — TELEPHONE ENCOUNTER
----- Message from Umm Robledo sent at 9/4/2018  9:37 AM CDT -----  Contact: Patient  Patient Returning Call from [facility]    Who Left Message for Patient: Pt stated Dr. Bravo may have called him.  Communication Preference: [phone]  Additional Information: n/a

## 2018-09-05 DIAGNOSIS — Z94.4 LIVER REPLACED BY TRANSPLANT: Primary | ICD-10-CM

## 2018-09-06 ENCOUNTER — TELEPHONE (OUTPATIENT)
Dept: TRANSPLANT | Facility: CLINIC | Age: 63
End: 2018-09-06

## 2018-09-06 DIAGNOSIS — E83.42 HYPOMAGNESEMIA: ICD-10-CM

## 2018-09-06 DIAGNOSIS — Z94.4 LIVER REPLACED BY TRANSPLANT: Primary | ICD-10-CM

## 2018-09-06 LAB
EXT ALBUMIN: 2.8
EXT ALKALINE PHOSPHATASE: 63
EXT ALT: 10
EXT AST: 13
EXT BILIRUBIN TOTAL: 0.8
EXT BUN: 30
EXT CALCIUM: 9.1
EXT CHLORIDE: 99
EXT CO2: 25
EXT CREATININE: 2 MG/DL
EXT EOSINOPHIL%: 7.9
EXT GLUCOSE: 127
EXT HEMATOCRIT: 25.4
EXT HEMOGLOBIN: 8.4
EXT LYMPH%: 8.5
EXT MONOCYTES%: 13.7
EXT PLATELETS: 148
EXT POTASSIUM: 4.5
EXT PROTEIN TOTAL: 5.9
EXT SEGS%: 64.7
EXT SODIUM: 134 MMOL/L
EXT TACROLIMUS LVL: 2.6
EXT WBC: 6.11

## 2018-09-06 RX ORDER — MYCOPHENOLATE MOFETIL 250 MG/1
500 CAPSULE ORAL 2 TIMES DAILY
Qty: 60 CAPSULE | Refills: 2 | Status: CANCELLED | OUTPATIENT
Start: 2018-09-06

## 2018-09-06 RX ORDER — MYCOPHENOLATE MOFETIL 200 MG/ML
500 POWDER, FOR SUSPENSION ORAL 2 TIMES DAILY
Qty: 150 ML | Refills: 11 | Status: CANCELLED | OUTPATIENT
Start: 2018-09-06

## 2018-09-06 NOTE — TELEPHONE ENCOUNTER
----- Message from Umm Robledo sent at 9/6/2018 10:46 AM CDT -----  Contact: Spouse  Needs Advice    Reason for call:  Pt is in [place] but is does not remember the number nor name who they should speak to at [facility]  Communication Preference: [phone]  Additional Information: n/a

## 2018-09-06 NOTE — TELEPHONE ENCOUNTER
Notified  (at  Rehab), that labs of today reviewed by  and needs to increase Cellcept dose to 500mg twice daily, and will need repeat labs on Monday 9/10/18.  She reported she will take care of dose change.      did report she was informed that patient had one episode of emesis last night , and still having some issues with nausea, denied diarrhea or constipation.

## 2018-09-06 NOTE — TELEPHONE ENCOUNTER
"Patient/wife notified and instructed to see the  at the [facility].  , she was able to voice understanding and said "I'm on my way there now."     "

## 2018-09-07 ENCOUNTER — CLINICAL SUPPORT (OUTPATIENT)
Dept: TRANSPLANT | Facility: CLINIC | Age: 63
End: 2018-09-07
Payer: MEDICARE

## 2018-09-07 ENCOUNTER — NUTRITION (OUTPATIENT)
Dept: TRANSPLANT | Facility: CLINIC | Age: 63
End: 2018-09-07
Payer: MEDICARE

## 2018-09-07 ENCOUNTER — TELEPHONE (OUTPATIENT)
Dept: TRANSPLANT | Facility: CLINIC | Age: 63
End: 2018-09-07

## 2018-09-07 VITALS
SYSTOLIC BLOOD PRESSURE: 118 MMHG | WEIGHT: 220 LBS | RESPIRATION RATE: 18 BRPM | DIASTOLIC BLOOD PRESSURE: 72 MMHG | TEMPERATURE: 98 F | HEIGHT: 74 IN | HEART RATE: 108 BPM | OXYGEN SATURATION: 97 % | BODY MASS INDEX: 28.23 KG/M2 | RESPIRATION RATE: 18 BRPM | WEIGHT: 220 LBS | HEART RATE: 108 BPM | TEMPERATURE: 98 F | BODY MASS INDEX: 28.23 KG/M2 | HEIGHT: 74 IN | SYSTOLIC BLOOD PRESSURE: 118 MMHG | DIASTOLIC BLOOD PRESSURE: 72 MMHG | OXYGEN SATURATION: 97 %

## 2018-09-07 DIAGNOSIS — R60.0 BILATERAL LEG EDEMA: ICD-10-CM

## 2018-09-07 DIAGNOSIS — Z94.4 LIVER REPLACED BY TRANSPLANT: Primary | ICD-10-CM

## 2018-09-07 DIAGNOSIS — K74.69 OTHER CIRRHOSIS OF LIVER: ICD-10-CM

## 2018-09-07 DIAGNOSIS — R11.2 NAUSEA AND VOMITING, INTRACTABILITY OF VOMITING NOT SPECIFIED, UNSPECIFIED VOMITING TYPE: ICD-10-CM

## 2018-09-07 DIAGNOSIS — E43 SEVERE PROTEIN-CALORIE MALNUTRITION: ICD-10-CM

## 2018-09-07 PROCEDURE — 99999 PR PBB SHADOW E&M-EST. PATIENT-LVL III: CPT | Mod: PBBFAC,,, | Performed by: DIETITIAN, REGISTERED

## 2018-09-07 PROCEDURE — 97802 MEDICAL NUTRITION INDIV IN: CPT | Mod: PBBFAC | Performed by: DIETITIAN, REGISTERED

## 2018-09-07 PROCEDURE — 99213 OFFICE O/P EST LOW 20 MIN: CPT | Mod: PBBFAC | Performed by: DIETITIAN, REGISTERED

## 2018-09-07 PROCEDURE — 99214 OFFICE O/P EST MOD 30 MIN: CPT | Mod: PBBFAC,27

## 2018-09-07 PROCEDURE — 99999 PR PBB SHADOW E&M-EST. PATIENT-LVL IV: CPT | Mod: PBBFAC,,,

## 2018-09-07 RX ORDER — SULFAMETHOXAZOLE AND TRIMETHOPRIM 400; 80 MG/1; MG/1
1 TABLET ORAL DAILY
Qty: 30 TABLET | Refills: 3 | Status: SHIPPED | OUTPATIENT
Start: 2018-09-07 | End: 2018-12-31

## 2018-09-07 RX ORDER — FUROSEMIDE 80 MG/1
80 TABLET ORAL 2 TIMES DAILY
Qty: 60 TABLET | Refills: 2 | Status: SHIPPED | OUTPATIENT
Start: 2018-09-07 | End: 2018-09-27

## 2018-09-07 RX ORDER — TACROLIMUS 1 MG/1
2 CAPSULE ORAL EVERY 12 HOURS
Qty: 120 CAPSULE | Refills: 11 | Status: SHIPPED | OUTPATIENT
Start: 2018-09-07 | End: 2018-09-17 | Stop reason: DRUGHIGH

## 2018-09-07 RX ORDER — ONDANSETRON 4 MG/1
4 TABLET, FILM COATED ORAL EVERY 8 HOURS PRN
Qty: 20 TABLET | Refills: 3 | Status: SHIPPED | OUTPATIENT
Start: 2018-09-07 | End: 2020-07-29

## 2018-09-07 RX ORDER — MYCOPHENOLATE MOFETIL 250 MG/1
500 CAPSULE ORAL 2 TIMES DAILY
Qty: 120 CAPSULE | Refills: 2 | Status: SHIPPED | OUTPATIENT
Start: 2018-09-07 | End: 2018-11-23

## 2018-09-07 RX ORDER — MYCOPHENOLATE MOFETIL 250 MG/1
500 CAPSULE ORAL 2 TIMES DAILY
Qty: 60 CAPSULE | Refills: 2 | Status: SHIPPED | OUTPATIENT
Start: 2018-09-07 | End: 2018-09-07

## 2018-09-07 NOTE — TELEPHONE ENCOUNTER
Spoke with patient/wife; reviewed appointments on 9/10 and 9/11 with her. She was able to repeat instructions and voiced understanding. Labs to be done 8am at the lab in the transplant clinic. She voiced understanding.

## 2018-09-07 NOTE — PROGRESS NOTES
Clinic Note: First Return to Clinic Post-  Liver Transplant    Alon Adamson  is a 63 y.o. male  S/p   LIVER transplant on 7/23/2018 (Liver) for Alcoholic Cirrhosis.      Met with patient and his caregiver in the clinic to review current medication list.     Current Outpatient Medications   Medication Sig Dispense Refill    acyclovir (ZOVIRAX) 200 MG capsule Take 2 capsules (400 mg total) by mouth 2 (two) times daily. STOP 10/22/18 120 capsule 2    aspirin (ECOTRIN) 81 MG EC tablet Take 1 tablet (81 mg total) by mouth once daily. 90 tablet 3    atorvastatin (LIPITOR) 40 MG tablet Take 1 tablet (40 mg total) by mouth once daily. 30 tablet 5    docusate sodium (COLACE) 100 MG capsule Take 1 capsule (100 mg total) by mouth 3 (three) times daily as needed for Constipation. 30 capsule 0    ergocalciferol (ERGOCALCIFEROL) 50,000 unit Cap Take 1 capsule (50,000 Units total) by mouth every 7 days for 10 doses 10 capsule 0    furosemide (LASIX) 80 MG tablet Take 1 tablet (80 mg total) by mouth 2 (two) times daily. 60 tablet 2    levETIRAcetam (KEPPRA) 500 MG Tab Take 1 tablet (500 mg total) by mouth 2 (two) times daily. 60 tablet 1    magnesium oxide (MAG-OX) 400 mg tablet Take 2 tablets (800 mg total) by mouth 3 (three) times daily. 180 tablet 5    mycophenolate (CELLCEPT) 250 mg Cap Take 2 capsules (500 mg total) by mouth 2 (two) times daily. 120 capsule 2    ondansetron (ZOFRAN) 4 MG tablet Take 1 tablet (4 mg total) by mouth every 8 (eight) hours as needed for Nausea. 20 tablet 3    pantoprazole (PROTONIX) 40 MG tablet Take 1 tablet (40 mg total) by mouth 2 (two) times daily before meals. 60 tablet 11    SITagliptin (JANUVIA) 25 MG Tab Take 1 tablet (25 mg total) by mouth once daily. 90 tablet 3    sulfamethoxazole-trimethoprim 400-80mg (BACTRIM,SEPTRA) 400-80 mg per tablet Take 1 tablet by mouth once daily. STOP 1/23/19 30 tablet 3    tacrolimus (PROGRAF) 1 MG Cap Take 2 capsules (2 mg total) by mouth every  12 (twelve) hours. 120 capsule 11     No current facility-administered medications for this visit.          1) Discussed any concerns or problems that the patient encountered since discharge:  -Patient left rehab facility and met with PharmD and coordinator in clinic.    -Patient complains of pain and nausea     2) Reconciled the EMR by having the patient verbalize their medications, dose, and frequency.      3) Verified that patient had prescriptions filled after being discharged from the hospital    4) Reviewed vital signs and laboratory values, and evaluated the need to adjust doses of medications.      5) Reinforced medication education conducted in the hospital, including medication indications, dosing, administration, side effects, monitoring-- including timing of immunosuppressant levels.     6) OTHER medication follow-up:    -Patient to  ondansetron from pharmacy  -Of note, atovaquone $487, will restart bactrim.      [facility]  Discussed the process for obtaining refills of medications, including verifying the dose and mailing address to have medications delivered.     Alon and his caregivers verbalized understanding and had the opportunity to ask questions.

## 2018-09-07 NOTE — PROGRESS NOTES
"REFERRING PROVIDER: nurse coordinator    REASON FOR VIST: post d/c from inpt Rehab, poor intake/manutrition, severe muscle wasting and fat loss    PAST MEDICAL HX: L tx 7/23/2018, GERD, Nausea/vomiting, CKDIII, deconditioning, severe protein-calorie malnutrition    LABS: reviewed    NUTRITION HX/INTERVENTION:   Pt present with spouse today, just d/c'd from rehab. Pt has very poor appetite, n/v at times, reflux as well. Pt going many hours with no intake of food or beverages. Pt states sometimes he can tolerate a few bites of food, wait a few minutes, take a few more bites. Other occasions pt may vomit after the first few bites. Pt states zofran is not helping. Pt explains he had an illeus "a few weeks ago", which is improved at this time. He has 1 regular BM daily.     Pt's spouse has protein powder, Premier protein shakes and other nutrition supplements at home for pt. Pt to begin drinking these today.   Provided written edu on tips for nausea/vomiting and GERD diet education.   Pt to consume at least 3 nutrition supplements/day as well as small portions of high prot/high serg foods in between.   If vomiting persists, contact coordinator.       Patient voiced understanding of education & goals. Contact information was provided & will f/u as needed at clinic visits.     Consultation Time: 30 minutes    "

## 2018-09-07 NOTE — TELEPHONE ENCOUNTER
----- Message from Yanet Atkins PharmKENTRELL sent at 9/7/2018  1:13 PM CDT -----  I asked Dr. Gómez in clinic and he was okay with it, so Mr. Adamson left clinic with Bactrim.  ----- Message -----  From: Helen Calzada  Sent: 9/7/2018   1:12 PM  To: Yanet Atkins PharmD    ok  ----- Message -----  From: Yanet Atkins PharmD  Sent: 9/7/2018   8:57 AM  To: Helen Calzada    Atovaquone is $487, can you discuss with provider if we can switch back to Bactrim?  His WBC is 6.1 and K 4.5 on most recent labs.

## 2018-09-10 ENCOUNTER — LAB VISIT (OUTPATIENT)
Dept: LAB | Facility: HOSPITAL | Age: 63
End: 2018-09-10
Attending: SURGERY
Payer: MEDICARE

## 2018-09-10 ENCOUNTER — TELEPHONE (OUTPATIENT)
Dept: TRANSPLANT | Facility: CLINIC | Age: 63
End: 2018-09-10

## 2018-09-10 DIAGNOSIS — Z94.4 LIVER REPLACED BY TRANSPLANT: ICD-10-CM

## 2018-09-10 DIAGNOSIS — E83.42 HYPOMAGNESEMIA: ICD-10-CM

## 2018-09-10 DIAGNOSIS — Z94.4 LIVER REPLACED BY TRANSPLANT: Primary | ICD-10-CM

## 2018-09-10 LAB
ALBUMIN SERPL BCP-MCNC: 3.3 G/DL
ALP SERPL-CCNC: 70 U/L
ALT SERPL W/O P-5'-P-CCNC: 11 U/L
ANION GAP SERPL CALC-SCNC: 11 MMOL/L
AST SERPL-CCNC: 11 U/L
BASOPHILS # BLD AUTO: 0.13 K/UL
BASOPHILS NFR BLD: 1.5 %
BILIRUB SERPL-MCNC: 0.9 MG/DL
BUN SERPL-MCNC: 43 MG/DL
CALCIUM SERPL-MCNC: 10 MG/DL
CHLORIDE SERPL-SCNC: 97 MMOL/L
CO2 SERPL-SCNC: 25 MMOL/L
CREAT SERPL-MCNC: 3.2 MG/DL
DIFFERENTIAL METHOD: ABNORMAL
EOSINOPHIL # BLD AUTO: 1 K/UL
EOSINOPHIL NFR BLD: 11.3 %
ERYTHROCYTE [DISTWIDTH] IN BLOOD BY AUTOMATED COUNT: 16.4 %
EST. GFR  (AFRICAN AMERICAN): 22.8 ML/MIN/1.73 M^2
EST. GFR  (NON AFRICAN AMERICAN): 19.7 ML/MIN/1.73 M^2
GLUCOSE SERPL-MCNC: 137 MG/DL
HCT VFR BLD AUTO: 27.3 %
HGB BLD-MCNC: 9.2 G/DL
IMM GRANULOCYTES # BLD AUTO: 0.27 K/UL
IMM GRANULOCYTES NFR BLD AUTO: 3.1 %
LYMPHOCYTES # BLD AUTO: 0.6 K/UL
LYMPHOCYTES NFR BLD: 7.4 %
MAGNESIUM SERPL-MCNC: 2.1 MG/DL
MCH RBC QN AUTO: 33 PG
MCHC RBC AUTO-ENTMCNC: 33.7 G/DL
MCV RBC AUTO: 98 FL
MONOCYTES # BLD AUTO: 1 K/UL
MONOCYTES NFR BLD: 11.8 %
NEUTROPHILS # BLD AUTO: 5.7 K/UL
NEUTROPHILS NFR BLD: 64.9 %
NRBC BLD-RTO: 0 /100 WBC
PLATELET # BLD AUTO: 171 K/UL
PMV BLD AUTO: 10.3 FL
POTASSIUM SERPL-SCNC: 4.8 MMOL/L
PROT SERPL-MCNC: 7 G/DL
RBC # BLD AUTO: 2.79 M/UL
SODIUM SERPL-SCNC: 133 MMOL/L
TACROLIMUS BLD-MCNC: 5.3 NG/ML
WBC # BLD AUTO: 8.7 K/UL

## 2018-09-10 PROCEDURE — 85025 COMPLETE CBC W/AUTO DIFF WBC: CPT

## 2018-09-10 PROCEDURE — 83735 ASSAY OF MAGNESIUM: CPT

## 2018-09-10 PROCEDURE — 80197 ASSAY OF TACROLIMUS: CPT

## 2018-09-10 PROCEDURE — 80053 COMPREHEN METABOLIC PANEL: CPT

## 2018-09-10 PROCEDURE — 36415 COLL VENOUS BLD VENIPUNCTURE: CPT

## 2018-09-10 NOTE — TELEPHONE ENCOUNTER
----- Message from Silvano Salmeron MD sent at 9/10/2018  2:40 PM CDT -----  Results ok. No action needed

## 2018-09-10 NOTE — TELEPHONE ENCOUNTER
Patient/wife notified and instructed; labs reviewed by ; kidney function elevated; instructed to increase hydration and repeat labs tomorrow morning at 8am(before taking morning dose of Prograf). She was able to repeat instructions and voiced understanding.

## 2018-09-11 ENCOUNTER — OFFICE VISIT (OUTPATIENT)
Dept: TRANSPLANT | Facility: CLINIC | Age: 63
End: 2018-09-11
Payer: MEDICARE

## 2018-09-11 ENCOUNTER — OFFICE VISIT (OUTPATIENT)
Dept: DERMATOLOGY | Facility: CLINIC | Age: 63
End: 2018-09-11
Payer: MEDICARE

## 2018-09-11 ENCOUNTER — LAB VISIT (OUTPATIENT)
Dept: LAB | Facility: HOSPITAL | Age: 63
End: 2018-09-11
Attending: SURGERY
Payer: MEDICARE

## 2018-09-11 VITALS
RESPIRATION RATE: 18 BRPM | HEART RATE: 100 BPM | DIASTOLIC BLOOD PRESSURE: 72 MMHG | WEIGHT: 215.63 LBS | TEMPERATURE: 98 F | BODY MASS INDEX: 27.67 KG/M2 | HEIGHT: 74 IN | OXYGEN SATURATION: 99 % | SYSTOLIC BLOOD PRESSURE: 129 MMHG

## 2018-09-11 DIAGNOSIS — Z94.4 LIVER TRANSPLANTED: ICD-10-CM

## 2018-09-11 DIAGNOSIS — R53.81 PHYSICAL DECONDITIONING: ICD-10-CM

## 2018-09-11 DIAGNOSIS — N18.30 CKD (CHRONIC KIDNEY DISEASE), STAGE III: ICD-10-CM

## 2018-09-11 DIAGNOSIS — N18.9 ACUTE KIDNEY INJURY SUPERIMPOSED ON CHRONIC KIDNEY DISEASE: Primary | ICD-10-CM

## 2018-09-11 DIAGNOSIS — N17.9 ACUTE KIDNEY INJURY SUPERIMPOSED ON CHRONIC KIDNEY DISEASE: Primary | ICD-10-CM

## 2018-09-11 DIAGNOSIS — Z29.89 PROPHYLACTIC IMMUNOTHERAPY: ICD-10-CM

## 2018-09-11 DIAGNOSIS — Z94.4 LIVER REPLACED BY TRANSPLANT: ICD-10-CM

## 2018-09-11 DIAGNOSIS — Z91.89 AT RISK FOR OPPORTUNISTIC INFECTIONS: ICD-10-CM

## 2018-09-11 DIAGNOSIS — Z94.4 LIVER REPLACED BY TRANSPLANT: Primary | ICD-10-CM

## 2018-09-11 DIAGNOSIS — Z51.81 ENCOUNTER FOR THERAPEUTIC DRUG MONITORING: ICD-10-CM

## 2018-09-11 DIAGNOSIS — Z79.60 LONG-TERM USE OF IMMUNOSUPPRESSANT MEDICATION: ICD-10-CM

## 2018-09-11 DIAGNOSIS — L30.9 ECZEMA, UNSPECIFIED TYPE: Primary | ICD-10-CM

## 2018-09-11 LAB
ALBUMIN SERPL BCP-MCNC: 3.1 G/DL
ALP SERPL-CCNC: 68 U/L
ALT SERPL W/O P-5'-P-CCNC: 8 U/L
ANION GAP SERPL CALC-SCNC: 12 MMOL/L
AST SERPL-CCNC: 10 U/L
BASOPHILS # BLD AUTO: 0.09 K/UL
BASOPHILS NFR BLD: 1.3 %
BILIRUB SERPL-MCNC: 0.6 MG/DL
BUN SERPL-MCNC: 47 MG/DL
CALCIUM SERPL-MCNC: 9.5 MG/DL
CHLORIDE SERPL-SCNC: 100 MMOL/L
CO2 SERPL-SCNC: 22 MMOL/L
CREAT SERPL-MCNC: 3.1 MG/DL
DIFFERENTIAL METHOD: ABNORMAL
EOSINOPHIL # BLD AUTO: 0.8 K/UL
EOSINOPHIL NFR BLD: 11.4 %
ERYTHROCYTE [DISTWIDTH] IN BLOOD BY AUTOMATED COUNT: 16.2 %
EST. GFR  (AFRICAN AMERICAN): 23.6 ML/MIN/1.73 M^2
EST. GFR  (NON AFRICAN AMERICAN): 20.5 ML/MIN/1.73 M^2
GLUCOSE SERPL-MCNC: 132 MG/DL
HCT VFR BLD AUTO: 24.9 %
HGB BLD-MCNC: 8.3 G/DL
IMM GRANULOCYTES # BLD AUTO: 0.17 K/UL
IMM GRANULOCYTES NFR BLD AUTO: 2.4 %
LYMPHOCYTES # BLD AUTO: 0.6 K/UL
LYMPHOCYTES NFR BLD: 8.2 %
MCH RBC QN AUTO: 32.2 PG
MCHC RBC AUTO-ENTMCNC: 33.3 G/DL
MCV RBC AUTO: 97 FL
MONOCYTES # BLD AUTO: 0.8 K/UL
MONOCYTES NFR BLD: 11.9 %
NEUTROPHILS # BLD AUTO: 4.5 K/UL
NEUTROPHILS NFR BLD: 64.8 %
NRBC BLD-RTO: 0 /100 WBC
PLATELET # BLD AUTO: 164 K/UL
PMV BLD AUTO: 10 FL
POTASSIUM SERPL-SCNC: 4.6 MMOL/L
PROT SERPL-MCNC: 6.4 G/DL
RBC # BLD AUTO: 2.58 M/UL
SODIUM SERPL-SCNC: 134 MMOL/L
TACROLIMUS BLD-MCNC: 5.2 NG/ML
WBC # BLD AUTO: 6.96 K/UL

## 2018-09-11 PROCEDURE — 99212 OFFICE O/P EST SF 10 MIN: CPT | Mod: PBBFAC,27 | Performed by: DERMATOLOGY

## 2018-09-11 PROCEDURE — 80197 ASSAY OF TACROLIMUS: CPT

## 2018-09-11 PROCEDURE — 80053 COMPREHEN METABOLIC PANEL: CPT

## 2018-09-11 PROCEDURE — 99214 OFFICE O/P EST MOD 30 MIN: CPT | Mod: 24,S$PBB,, | Performed by: TRANSPLANT SURGERY

## 2018-09-11 PROCEDURE — 99999 PR PBB SHADOW E&M-EST. PATIENT-LVL III: CPT | Mod: PBBFAC,,,

## 2018-09-11 PROCEDURE — 36415 COLL VENOUS BLD VENIPUNCTURE: CPT

## 2018-09-11 PROCEDURE — 85025 COMPLETE CBC W/AUTO DIFF WBC: CPT

## 2018-09-11 PROCEDURE — 99213 OFFICE O/P EST LOW 20 MIN: CPT | Mod: PBBFAC

## 2018-09-11 PROCEDURE — 99202 OFFICE O/P NEW SF 15 MIN: CPT | Mod: S$PBB,,, | Performed by: DERMATOLOGY

## 2018-09-11 PROCEDURE — 99999 PR PBB SHADOW E&M-EST. PATIENT-LVL II: CPT | Mod: PBBFAC,,, | Performed by: DERMATOLOGY

## 2018-09-11 RX ORDER — TRIAMCINOLONE ACETONIDE 1 MG/G
CREAM TOPICAL 2 TIMES DAILY
Qty: 454 G | Refills: 2 | Status: SHIPPED | OUTPATIENT
Start: 2018-09-11 | End: 2019-03-29

## 2018-09-11 NOTE — PROGRESS NOTES
Transplant Surgery  Liver Transplant Recipient Follow-up    Original Referring Physician: Bruno Scott  Current Corresponding Physician: Mckinley Rivera    Chief Complaint: Alon is here for follow up of his liver transplant performed 2018 for the primary diagnosis (UNOS) of Alcoholic Cirrhosis    ORGAN: LIVER  Whole or Partial: whole liver  Donor Type:  - brain death  PHS Increased Risk: no  Donor CMV Status: Negative  Donor HCV Status: Negative  Donor HBcAb: Negative  Donor HBV TAMAR: Negative  Donor HCV TAMAR: Negative    Biliary Anastomosis: end to end  Arterial Anatomy: standard  IVC reconstruction: end to end ivc  Portal vein status: partial patent    Subjective:     History of Present Illness: He has had the following complications since transplant: renal insufficiency and debilitation.  The noted complications need to be addressed more thoroughly today.    Interval History: Currently, he is doing well.  Current complaints include none.  Alon is here for management of his immunosuppression medication.    Review of Systems   Constitutional: Negative for activity change, appetite change, chills, diaphoresis, fatigue, fever and unexpected weight change.   HENT: Negative for congestion, dental problem, ear pain, facial swelling, mouth sores, nosebleeds, sore throat, tinnitus, trouble swallowing and voice change.    Eyes: Negative for photophobia, pain and visual disturbance.   Respiratory: Negative for apnea, cough, choking, chest tightness and shortness of breath.    Cardiovascular: Negative for chest pain, palpitations and leg swelling.   Gastrointestinal: Negative for abdominal distention, abdominal pain, blood in stool, constipation, diarrhea, nausea and vomiting.   Endocrine: Negative for cold intolerance and heat intolerance.   Genitourinary: Negative for difficulty urinating, dysuria, flank pain, hematuria and urgency.   Musculoskeletal: Negative for arthralgias and  gait problem.   Skin: Negative for color change, pallor and rash.   Neurological: Positive for weakness. Negative for dizziness, tremors, seizures, syncope and light-headedness.   Hematological: Negative for adenopathy. Does not bruise/bleed easily.   Psychiatric/Behavioral: Negative for agitation and confusion.       Objective:     Physical Exam   Constitutional: He is oriented to person, place, and time. No distress.   Muscle wasting   HENT:   Head: Normocephalic and atraumatic.   Mouth/Throat: Oropharynx is clear and moist. No oropharyngeal exudate.   Eyes: Conjunctivae are normal. Pupils are equal, round, and reactive to light. No scleral icterus.   Neck: Normal range of motion. Neck supple.   Cardiovascular: Normal rate, regular rhythm and normal heart sounds.   Pulmonary/Chest: Effort normal and breath sounds normal. No respiratory distress.   Abdominal: Soft. Bowel sounds are normal. He exhibits no distension. There is no tenderness. There is no rebound and no guarding.   Musculoskeletal: Normal range of motion. He exhibits no edema.   Lymphadenopathy:     He has no cervical adenopathy.   Neurological: He is alert and oriented to person, place, and time.   Skin: Skin is warm and dry. No rash noted. No erythema.   Psychiatric: He has a normal mood and affect. His behavior is normal.     Lab Results   Component Value Date    BILITOT 0.6 09/11/2018    AST 10 09/11/2018    ALT 8 (L) 09/11/2018    ALKPHOS 68 09/11/2018    CREATININE 3.1 (H) 09/11/2018    ALBUMIN 3.1 (L) 09/11/2018     Lab Results   Component Value Date    WBC 6.96 09/11/2018    HGB 8.3 (L) 09/11/2018    HCT 24.9 (L) 09/11/2018    HCT 25 (L) 07/23/2018     09/11/2018     Lab Results   Component Value Date    TACROLIMUS 5.2 09/11/2018       Assessment/Plan:          · S/P liver transplant.  · Chronic immunosuppressive medications for rejection prophylaxis at target.  Plan: no adjustment needed.  · Continue monitoring symptoms, labs and drug  levels for drug-related toxicity and side effects.  · Incision: staples out, wound well-healed, no evidence of hernia  · Femoral arterial line site: no complications evident   · Continue to encourage hydration and follow renal function  · Continue PT    MD TERRA Pop Patient Status  Functional Status: 50% - Requires considerable assistance and frequent medical care  Physical Capacity: Limited Mobility

## 2018-09-11 NOTE — PROGRESS NOTES
Subjective:       Patient ID:  Alon Adamson is a 63 y.o. male who presents for   Chief Complaint   Patient presents with    Rash     both arms     Patient here for initial evaluation of a rash on his forearms. He is s/p liver transplant 7/23/2018 for alcoholic cirrhosis. He is maintained on prograf and cellcept. He is on antiviral and abx prophylaxis. Per his wife, prior to admission in July he had a full body rash that was itchy but improved and resolved after his transplant. About 3 weeks ago, he developed an itchy eruption on his bilateral arms. He has been applying cetaphil lotion. Seems to be spreading from wrist to now elbow region. Is intensely itchy. Denies rash elsewhere.    Labs: eos 0.8, CMP with Cr 3.4 (was normal 7/2018), Ast ALT wnl      Rash  - Initial  Affected locations: left arm and right arm  Duration: 1 month  Signs / symptoms: itching  Aggravated by: nothing  Relieving factors/Treatments tried: OTC moisturizer        Review of Systems   Constitutional: Negative for fever, chills, fatigue and malaise.   Respiratory: Negative for cough.    Skin: Positive for itching and rash.   Hematologic/Lymphatic: Negative for adenopathy. Bruises/bleeds easily.   Allergic/Immunologic: Positive for environmental allergies.        Objective:    Physical Exam   Constitutional: He appears well-developed and well-nourished. He is in a wheelchair.  No distress.   Neurological: He is alert and oriented to person, place, and time. He is not disoriented.   Psychiatric: He has a normal mood and affect.   Skin:                 Diagram Legend     Erythematous scaling macule/papule c/w actinic keratosis       Vascular papule c/w angioma      Pigmented verrucoid papule/plaque c/w seborrheic keratosis      Yellow umbilicated papule c/w sebaceous hyperplasia      Irregularly shaped tan macule c/w lentigo     1-2 mm smooth white papules consistent with Milia      Movable subcutaneous cyst with punctum c/w epidermal  inclusion cyst      Subcutaneous movable cyst c/w pilar cyst      Firm pink to brown papule c/w dermatofibroma      Pedunculated fleshy papule(s) c/w skin tag(s)      Evenly pigmented macule c/w junctional nevus     Mildly variegated pigmented, slightly irregular-bordered macule c/w mildly atypical nevus      Flesh colored to evenly pigmented papule c/w intradermal nevus       Pink pearly papule/plaque c/w basal cell carcinoma      Erythematous hyperkeratotic cursted plaque c/w SCC      Surgical scar with no sign of skin cancer recurrence      Open and closed comedones      Inflammatory papules and pustules      Verrucoid papule consistent consistent with wart     Erythematous eczematous patches and plaques     Dystrophic onycholytic nail with subungual debris c/w onychomycosis     Umbilicated papule    Erythematous-base heme-crusted tan verrucoid plaque consistent with inflamed seborrheic keratosis     Erythematous Silvery Scaling Plaque c/w Psoriasis     See annotation      Assessment / Plan:        Eczema, unspecified type  -     triamcinolone acetonide 0.1% (KENALOG) 0.1 % cream; Apply topically 2 (two) times daily. To rash for 14 days  Dispense: 454 g; Refill: 2    I advised changing to a fragrance free bar soap or body wash such as Dove, and fragrance free detergent such as Tide Free & Clear, for sensitive skin.      Good skin care regimen discussed including limiting to one bath or shower/day, using lukewarm water with mild soap and moisturizing cream to skin 1 - 2x/day. Brochure was provided and reviewed with patient.           Follow-up if symptoms worsen or fail to improve.

## 2018-09-17 ENCOUNTER — LAB VISIT (OUTPATIENT)
Dept: LAB | Facility: HOSPITAL | Age: 63
End: 2018-09-17
Attending: SURGERY
Payer: MEDICARE

## 2018-09-17 DIAGNOSIS — Z94.4 LIVER REPLACED BY TRANSPLANT: ICD-10-CM

## 2018-09-17 DIAGNOSIS — Z94.4 LIVER TRANSPLANTED: Primary | ICD-10-CM

## 2018-09-17 DIAGNOSIS — R60.0 BILATERAL LEG EDEMA: ICD-10-CM

## 2018-09-17 DIAGNOSIS — K74.69 OTHER CIRRHOSIS OF LIVER: ICD-10-CM

## 2018-09-17 LAB
ALBUMIN SERPL BCP-MCNC: 3.4 G/DL
ALP SERPL-CCNC: 65 U/L
ALT SERPL W/O P-5'-P-CCNC: 10 U/L
ANION GAP SERPL CALC-SCNC: 12 MMOL/L
AST SERPL-CCNC: 10 U/L
BASOPHILS # BLD AUTO: 0.09 K/UL
BASOPHILS NFR BLD: 1.6 %
BILIRUB DIRECT SERPL-MCNC: 0.2 MG/DL
BILIRUB SERPL-MCNC: 0.4 MG/DL
BUN SERPL-MCNC: 75 MG/DL
CALCIUM SERPL-MCNC: 9.7 MG/DL
CHLORIDE SERPL-SCNC: 104 MMOL/L
CO2 SERPL-SCNC: 19 MMOL/L
CREAT SERPL-MCNC: 1.8 MG/DL
DIFFERENTIAL METHOD: ABNORMAL
EOSINOPHIL # BLD AUTO: 0.6 K/UL
EOSINOPHIL NFR BLD: 10.4 %
ERYTHROCYTE [DISTWIDTH] IN BLOOD BY AUTOMATED COUNT: 16.8 %
EST. GFR  (AFRICAN AMERICAN): 45.6 ML/MIN/1.73 M^2
EST. GFR  (NON AFRICAN AMERICAN): 39.5 ML/MIN/1.73 M^2
GLUCOSE SERPL-MCNC: 141 MG/DL
HCT VFR BLD AUTO: 26.1 %
HGB BLD-MCNC: 8.5 G/DL
IMM GRANULOCYTES # BLD AUTO: 0.12 K/UL
IMM GRANULOCYTES NFR BLD AUTO: 2.1 %
LYMPHOCYTES # BLD AUTO: 0.7 K/UL
LYMPHOCYTES NFR BLD: 11.4 %
MCH RBC QN AUTO: 32.1 PG
MCHC RBC AUTO-ENTMCNC: 32.6 G/DL
MCV RBC AUTO: 99 FL
MONOCYTES # BLD AUTO: 0.8 K/UL
MONOCYTES NFR BLD: 13.1 %
NEUTROPHILS # BLD AUTO: 3.6 K/UL
NEUTROPHILS NFR BLD: 61.4 %
NRBC BLD-RTO: 0 /100 WBC
PLATELET # BLD AUTO: 164 K/UL
PMV BLD AUTO: 10.3 FL
POTASSIUM SERPL-SCNC: 4.8 MMOL/L
PROT SERPL-MCNC: 6.8 G/DL
RBC # BLD AUTO: 2.65 M/UL
SODIUM SERPL-SCNC: 135 MMOL/L
TACROLIMUS BLD-MCNC: 3.5 NG/ML
WBC # BLD AUTO: 5.78 K/UL

## 2018-09-17 PROCEDURE — 85025 COMPLETE CBC W/AUTO DIFF WBC: CPT

## 2018-09-17 PROCEDURE — 82248 BILIRUBIN DIRECT: CPT

## 2018-09-17 PROCEDURE — 36415 COLL VENOUS BLD VENIPUNCTURE: CPT

## 2018-09-17 PROCEDURE — 80053 COMPREHEN METABOLIC PANEL: CPT

## 2018-09-17 PROCEDURE — 80197 ASSAY OF TACROLIMUS: CPT

## 2018-09-17 NOTE — TELEPHONE ENCOUNTER
----- Message from Francine De La Torre sent at 9/17/2018 12:12 PM CDT -----  Contact: [facility]  Needs Advice    Reason for call: Lupe would like to speak with Helen        Communication Preference: lupe [phone]    Additional Information: n/a

## 2018-09-17 NOTE — TELEPHONE ENCOUNTER
Called pt to review labs. Pt was asleep so reviewed labs and increase in prograf dose with his wife Vin. Prograf increased to 3mg twice daily and repeat labs on Wednesday. She states that she understands.       Also returned call to [facility]  health nurse. She called to report that pt continues to complain of bilateral shoulder and back pain. She was asking if pt could have additional testing. She states limited in performing PT/OT activities. Will review with MD.

## 2018-09-18 RX ORDER — TACROLIMUS 1 MG/1
3 CAPSULE ORAL EVERY 12 HOURS
Qty: 180 CAPSULE | Refills: 11 | Status: SHIPPED | OUTPATIENT
Start: 2018-09-18 | End: 2018-09-24

## 2018-09-19 ENCOUNTER — TELEPHONE (OUTPATIENT)
Dept: TRANSPLANT | Facility: CLINIC | Age: 63
End: 2018-09-19

## 2018-09-19 ENCOUNTER — LAB VISIT (OUTPATIENT)
Dept: LAB | Facility: HOSPITAL | Age: 63
End: 2018-09-19
Attending: SURGERY
Payer: MEDICARE

## 2018-09-19 DIAGNOSIS — Z94.4 LIVER REPLACED BY TRANSPLANT: Primary | ICD-10-CM

## 2018-09-19 DIAGNOSIS — Z94.4 LIVER TRANSPLANTED: ICD-10-CM

## 2018-09-19 LAB
ALBUMIN SERPL BCP-MCNC: 3.4 G/DL
ALP SERPL-CCNC: 60 U/L
ALT SERPL W/O P-5'-P-CCNC: 10 U/L
ANION GAP SERPL CALC-SCNC: 11 MMOL/L
AST SERPL-CCNC: 11 U/L
BASOPHILS # BLD AUTO: 0.09 K/UL
BASOPHILS NFR BLD: 1.7 %
BILIRUB DIRECT SERPL-MCNC: 0.3 MG/DL
BILIRUB SERPL-MCNC: 0.4 MG/DL
BUN SERPL-MCNC: 71 MG/DL
CALCIUM SERPL-MCNC: 9.4 MG/DL
CHLORIDE SERPL-SCNC: 106 MMOL/L
CO2 SERPL-SCNC: 20 MMOL/L
CREAT SERPL-MCNC: 1.9 MG/DL
DIFFERENTIAL METHOD: ABNORMAL
EOSINOPHIL # BLD AUTO: 0.4 K/UL
EOSINOPHIL NFR BLD: 7.3 %
ERYTHROCYTE [DISTWIDTH] IN BLOOD BY AUTOMATED COUNT: 16.9 %
EST. GFR  (AFRICAN AMERICAN): 42.7 ML/MIN/1.73 M^2
EST. GFR  (NON AFRICAN AMERICAN): 37 ML/MIN/1.73 M^2
GLUCOSE SERPL-MCNC: 109 MG/DL
HCT VFR BLD AUTO: 26.2 %
HGB BLD-MCNC: 8.8 G/DL
IMM GRANULOCYTES # BLD AUTO: 0.06 K/UL
IMM GRANULOCYTES NFR BLD AUTO: 1.1 %
LYMPHOCYTES # BLD AUTO: 0.7 K/UL
LYMPHOCYTES NFR BLD: 12.8 %
MCH RBC QN AUTO: 33 PG
MCHC RBC AUTO-ENTMCNC: 33.6 G/DL
MCV RBC AUTO: 98 FL
MONOCYTES # BLD AUTO: 0.6 K/UL
MONOCYTES NFR BLD: 11.3 %
NEUTROPHILS # BLD AUTO: 3.4 K/UL
NEUTROPHILS NFR BLD: 65.8 %
NRBC BLD-RTO: 0 /100 WBC
PLATELET # BLD AUTO: 151 K/UL
PMV BLD AUTO: 10.5 FL
POTASSIUM SERPL-SCNC: 4.1 MMOL/L
PROT SERPL-MCNC: 6.7 G/DL
RBC # BLD AUTO: 2.67 M/UL
SODIUM SERPL-SCNC: 137 MMOL/L
TACROLIMUS BLD-MCNC: 5.6 NG/ML
WBC # BLD AUTO: 5.23 K/UL

## 2018-09-19 PROCEDURE — 80197 ASSAY OF TACROLIMUS: CPT

## 2018-09-19 PROCEDURE — 36415 COLL VENOUS BLD VENIPUNCTURE: CPT

## 2018-09-19 PROCEDURE — 80053 COMPREHEN METABOLIC PANEL: CPT

## 2018-09-19 PROCEDURE — 82248 BILIRUBIN DIRECT: CPT

## 2018-09-19 PROCEDURE — 85025 COMPLETE CBC W/AUTO DIFF WBC: CPT

## 2018-09-19 NOTE — TELEPHONE ENCOUNTER
Patient/wife notified and instructed: labs reviewed , no medicine changes made; repeat labs on Monday 9/24/18. She was able to voice understanding.

## 2018-09-19 NOTE — TELEPHONE ENCOUNTER
----- Message from Scott Hoffmann MD sent at 9/19/2018  1:51 PM CDT -----  Results reviewed, no immediate action required.

## 2018-09-20 ENCOUNTER — TELEPHONE (OUTPATIENT)
Dept: TRANSPLANT | Facility: CLINIC | Age: 63
End: 2018-09-20

## 2018-09-20 DIAGNOSIS — M25.512 BILATERAL SHOULDER PAIN, UNSPECIFIED CHRONICITY: Primary | ICD-10-CM

## 2018-09-20 DIAGNOSIS — M25.511 BILATERAL SHOULDER PAIN, UNSPECIFIED CHRONICITY: Primary | ICD-10-CM

## 2018-09-20 NOTE — TELEPHONE ENCOUNTER
Reviewed with : message from Lupe [facility], that patient c/o bilateral shoulder pain , unable to do full PT.  VORB : get Shoulder Xrys.   Spoke to patient's wife with above information and xrays will be scheduled. Message left for Lupe at [facility] that  has ordered shoulder xrays.

## 2018-09-20 NOTE — TELEPHONE ENCOUNTER
"Patient wife notified and instructed: shoulder xrays scheduled tomorrow morning 10:45 AM at [facility]. She stated "that'll be fine."  "

## 2018-09-21 ENCOUNTER — HOSPITAL ENCOUNTER (OUTPATIENT)
Dept: RADIOLOGY | Facility: HOSPITAL | Age: 63
Discharge: HOME | End: 2018-09-21
Attending: SURGERY
Payer: MEDICARE

## 2018-09-21 ENCOUNTER — TELEPHONE (OUTPATIENT)
Dept: TRANSPLANT | Facility: CLINIC | Age: 63
End: 2018-09-21

## 2018-09-21 DIAGNOSIS — M25.511 BILATERAL SHOULDER PAIN, UNSPECIFIED CHRONICITY: ICD-10-CM

## 2018-09-21 DIAGNOSIS — M25.512 BILATERAL SHOULDER PAIN, UNSPECIFIED CHRONICITY: ICD-10-CM

## 2018-09-21 PROCEDURE — 73030 X-RAY EXAM OF SHOULDER: CPT | Mod: TC,50

## 2018-09-21 PROCEDURE — 73030 X-RAY EXAM OF SHOULDER: CPT | Mod: 26,50,, | Performed by: RADIOLOGY

## 2018-09-21 NOTE — TELEPHONE ENCOUNTER
Patient/wife notified that shoulder xrays reviewed by ; results ok, no further instructions.  She was able to voice understanding.

## 2018-09-21 NOTE — TELEPHONE ENCOUNTER
----- Message from Silvano Salmeron MD sent at 9/21/2018  1:35 PM CDT -----  Results ok. No action needed

## 2018-09-24 ENCOUNTER — LAB VISIT (OUTPATIENT)
Dept: LAB | Facility: HOSPITAL | Age: 63
End: 2018-09-24
Attending: SURGERY
Payer: MEDICARE

## 2018-09-24 DIAGNOSIS — Z94.4 LIVER REPLACED BY TRANSPLANT: Primary | ICD-10-CM

## 2018-09-24 DIAGNOSIS — Z94.4 LIVER TRANSPLANTED: ICD-10-CM

## 2018-09-24 DIAGNOSIS — Z94.4 LIVER REPLACED BY TRANSPLANT: ICD-10-CM

## 2018-09-24 DIAGNOSIS — N28.9 RENAL INSUFFICIENCY: ICD-10-CM

## 2018-09-24 LAB
ALBUMIN SERPL BCP-MCNC: 3.6 G/DL
ALP SERPL-CCNC: 60 U/L
ALT SERPL W/O P-5'-P-CCNC: 9 U/L
ANION GAP SERPL CALC-SCNC: 11 MMOL/L
AST SERPL-CCNC: 9 U/L
BASOPHILS # BLD AUTO: 0.08 K/UL
BASOPHILS NFR BLD: 1.4 %
BILIRUB SERPL-MCNC: 0.4 MG/DL
BUN SERPL-MCNC: 84 MG/DL
CALCIUM SERPL-MCNC: 10.1 MG/DL
CHLORIDE SERPL-SCNC: 107 MMOL/L
CO2 SERPL-SCNC: 20 MMOL/L
CREAT SERPL-MCNC: 2.2 MG/DL
DIFFERENTIAL METHOD: ABNORMAL
EOSINOPHIL # BLD AUTO: 0.4 K/UL
EOSINOPHIL NFR BLD: 7.2 %
ERYTHROCYTE [DISTWIDTH] IN BLOOD BY AUTOMATED COUNT: 16.4 %
EST. GFR  (AFRICAN AMERICAN): 35.8 ML/MIN/1.73 M^2
EST. GFR  (NON AFRICAN AMERICAN): 31 ML/MIN/1.73 M^2
GLUCOSE SERPL-MCNC: 131 MG/DL
HCT VFR BLD AUTO: 27.5 %
HGB BLD-MCNC: 9.3 G/DL
IMM GRANULOCYTES # BLD AUTO: 0.04 K/UL
IMM GRANULOCYTES NFR BLD AUTO: 0.7 %
LYMPHOCYTES # BLD AUTO: 0.7 K/UL
LYMPHOCYTES NFR BLD: 11.8 %
MCH RBC QN AUTO: 33 PG
MCHC RBC AUTO-ENTMCNC: 33.8 G/DL
MCV RBC AUTO: 98 FL
MONOCYTES # BLD AUTO: 0.7 K/UL
MONOCYTES NFR BLD: 11.8 %
NEUTROPHILS # BLD AUTO: 3.8 K/UL
NEUTROPHILS NFR BLD: 67.1 %
NRBC BLD-RTO: 0 /100 WBC
PLATELET # BLD AUTO: 122 K/UL
PMV BLD AUTO: 11.6 FL
POTASSIUM SERPL-SCNC: 4.9 MMOL/L
PROT SERPL-MCNC: 6.9 G/DL
RBC # BLD AUTO: 2.82 M/UL
SODIUM SERPL-SCNC: 138 MMOL/L
TACROLIMUS BLD-MCNC: 5.8 NG/ML
WBC # BLD AUTO: 5.59 K/UL

## 2018-09-24 PROCEDURE — 80197 ASSAY OF TACROLIMUS: CPT

## 2018-09-24 PROCEDURE — 85025 COMPLETE CBC W/AUTO DIFF WBC: CPT

## 2018-09-24 PROCEDURE — 80053 COMPREHEN METABOLIC PANEL: CPT

## 2018-09-24 PROCEDURE — 36415 COLL VENOUS BLD VENIPUNCTURE: CPT

## 2018-09-24 NOTE — TELEPHONE ENCOUNTER
Patient/wife notified and instructed: patient to reduce Prograf dose to 2mg twice daily, repeat labs on Thursday 9/27/18 and will need to turn in urine test for protein (will get cup at the clinic lab). She was able to repeat instructions and voiced understanding.

## 2018-09-24 NOTE — TELEPHONE ENCOUNTER
----- Message from Silvano Salmeron MD sent at 9/24/2018  2:51 PM CDT -----  fk 2 bid  Lipid panel and pr/cr ratio

## 2018-09-26 RX ORDER — TACROLIMUS 1 MG/1
2 CAPSULE ORAL EVERY 12 HOURS
Qty: 120 CAPSULE | Refills: 11
Start: 2018-09-26 | End: 2018-09-28

## 2018-09-27 ENCOUNTER — TELEPHONE (OUTPATIENT)
Dept: TRANSPLANT | Facility: HOSPITAL | Age: 63
End: 2018-09-27

## 2018-09-27 ENCOUNTER — LAB VISIT (OUTPATIENT)
Dept: LAB | Facility: HOSPITAL | Age: 63
End: 2018-09-27
Attending: INTERNAL MEDICINE
Payer: MEDICARE

## 2018-09-27 ENCOUNTER — OFFICE VISIT (OUTPATIENT)
Dept: TRANSPLANT | Facility: CLINIC | Age: 63
End: 2018-09-27
Payer: MEDICARE

## 2018-09-27 VITALS
TEMPERATURE: 98 F | OXYGEN SATURATION: 98 % | BODY MASS INDEX: 26.23 KG/M2 | RESPIRATION RATE: 18 BRPM | SYSTOLIC BLOOD PRESSURE: 125 MMHG | DIASTOLIC BLOOD PRESSURE: 76 MMHG | HEIGHT: 74 IN | WEIGHT: 204.38 LBS | HEART RATE: 103 BPM

## 2018-09-27 DIAGNOSIS — R63.0 ANOREXIA: Primary | ICD-10-CM

## 2018-09-27 DIAGNOSIS — Z94.4 LIVER TRANSPLANTED: ICD-10-CM

## 2018-09-27 DIAGNOSIS — R11.2 NON-INTRACTABLE VOMITING WITH NAUSEA, UNSPECIFIED VOMITING TYPE: ICD-10-CM

## 2018-09-27 DIAGNOSIS — D84.9 IMMUNOSUPPRESSION: ICD-10-CM

## 2018-09-27 DIAGNOSIS — R60.0 BILATERAL LEG EDEMA: ICD-10-CM

## 2018-09-27 DIAGNOSIS — R53.81 PHYSICAL DEBILITY: Primary | ICD-10-CM

## 2018-09-27 DIAGNOSIS — Z94.4 LIVER REPLACED BY TRANSPLANT: ICD-10-CM

## 2018-09-27 DIAGNOSIS — R63.4 WEIGHT LOSS: ICD-10-CM

## 2018-09-27 LAB
ALBUMIN SERPL BCP-MCNC: 4 G/DL
ALP SERPL-CCNC: 69 U/L
ALT SERPL W/O P-5'-P-CCNC: 9 U/L
ANION GAP SERPL CALC-SCNC: 10 MMOL/L
AST SERPL-CCNC: 11 U/L
BASOPHILS # BLD AUTO: 0.08 K/UL
BASOPHILS NFR BLD: 1.5 %
BILIRUB SERPL-MCNC: 0.6 MG/DL
BUN SERPL-MCNC: 77 MG/DL
CALCIUM SERPL-MCNC: 10.3 MG/DL
CHLORIDE SERPL-SCNC: 104 MMOL/L
CHOLEST SERPL-MCNC: 147 MG/DL
CHOLEST/HDLC SERPL: 6.1 {RATIO}
CO2 SERPL-SCNC: 24 MMOL/L
CREAT SERPL-MCNC: 2.4 MG/DL
DIFFERENTIAL METHOD: ABNORMAL
EOSINOPHIL # BLD AUTO: 0.5 K/UL
EOSINOPHIL NFR BLD: 8.3 %
ERYTHROCYTE [DISTWIDTH] IN BLOOD BY AUTOMATED COUNT: 16.3 %
EST. GFR  (AFRICAN AMERICAN): 32.2 ML/MIN/1.73 M^2
EST. GFR  (NON AFRICAN AMERICAN): 27.9 ML/MIN/1.73 M^2
GLUCOSE SERPL-MCNC: 138 MG/DL
HCT VFR BLD AUTO: 30.3 %
HDLC SERPL-MCNC: 24 MG/DL
HDLC SERPL: 16.3 %
HGB BLD-MCNC: 9.8 G/DL
IMM GRANULOCYTES # BLD AUTO: 0.05 K/UL
IMM GRANULOCYTES NFR BLD AUTO: 0.9 %
LDLC SERPL CALC-MCNC: 70.6 MG/DL
LYMPHOCYTES # BLD AUTO: 0.6 K/UL
LYMPHOCYTES NFR BLD: 11.5 %
MCH RBC QN AUTO: 32 PG
MCHC RBC AUTO-ENTMCNC: 32.3 G/DL
MCV RBC AUTO: 99 FL
MONOCYTES # BLD AUTO: 0.6 K/UL
MONOCYTES NFR BLD: 11.9 %
NEUTROPHILS # BLD AUTO: 3.6 K/UL
NEUTROPHILS NFR BLD: 65.9 %
NONHDLC SERPL-MCNC: 123 MG/DL
NRBC BLD-RTO: 0 /100 WBC
PLATELET # BLD AUTO: 114 K/UL
PMV BLD AUTO: 11.4 FL
POTASSIUM SERPL-SCNC: 5.1 MMOL/L
PROT SERPL-MCNC: 7.5 G/DL
RBC # BLD AUTO: 3.06 M/UL
SODIUM SERPL-SCNC: 138 MMOL/L
TACROLIMUS BLD-MCNC: 5.1 NG/ML
TRIGL SERPL-MCNC: 262 MG/DL
WBC # BLD AUTO: 5.39 K/UL

## 2018-09-27 PROCEDURE — 80197 ASSAY OF TACROLIMUS: CPT | Mod: TXP

## 2018-09-27 PROCEDURE — 99215 OFFICE O/P EST HI 40 MIN: CPT | Mod: S$PBB,TXP,, | Performed by: INTERNAL MEDICINE

## 2018-09-27 PROCEDURE — 85025 COMPLETE CBC W/AUTO DIFF WBC: CPT

## 2018-09-27 PROCEDURE — 36415 COLL VENOUS BLD VENIPUNCTURE: CPT

## 2018-09-27 PROCEDURE — 80053 COMPREHEN METABOLIC PANEL: CPT | Mod: TXP

## 2018-09-27 PROCEDURE — 99999 PR PBB SHADOW E&M-EST. PATIENT-LVL III: CPT | Mod: PBBFAC,TXP,, | Performed by: INTERNAL MEDICINE

## 2018-09-27 PROCEDURE — 99213 OFFICE O/P EST LOW 20 MIN: CPT | Mod: PBBFAC | Performed by: INTERNAL MEDICINE

## 2018-09-27 PROCEDURE — 80061 LIPID PANEL: CPT

## 2018-09-27 RX ORDER — CYPROHEPTADINE HYDROCHLORIDE 4 MG/1
4 TABLET ORAL 3 TIMES DAILY
Qty: 42 TABLET | Refills: 0 | Status: SHIPPED | OUTPATIENT
Start: 2018-09-27 | End: 2019-03-29 | Stop reason: ALTCHOICE

## 2018-09-27 RX ORDER — FUROSEMIDE 80 MG/1
80 TABLET ORAL DAILY
Qty: 30 TABLET | Refills: 2
Start: 2018-09-27 | End: 2018-10-02 | Stop reason: SDUPTHER

## 2018-09-27 RX ORDER — METOCLOPRAMIDE 5 MG/1
5 TABLET ORAL
Qty: 42 TABLET | Refills: 0 | Status: SHIPPED | OUTPATIENT
Start: 2018-09-27 | End: 2019-03-29 | Stop reason: ALTCHOICE

## 2018-09-27 NOTE — TELEPHONE ENCOUNTER
----- Message from Kellie Gutierrez sent at 9/27/2018  4:51 PM CDT -----  Calling to speak with Sandra LYNN regarding pt going home    Contact [phone]

## 2018-09-27 NOTE — LETTER
September 28, 2018        Bruno Scott  [address]  [place] [address]  Phone: [phone]  Fax: [phone]     [facility]  [address]  [place] [address]  Phone: [phone]  Fax: [phone]             [facility] Trans[facility]  [address]  [facility] [address]  Phone: [phone]   Patient: Alon Adamson   MR Number: [id number]   YOB: 1955   Date of Visit: 9/27/2018       Dear Dr. Bruno Scott, [facility]    Thank you for referring Alon Adamson to me for evaluation. Attached you will find relevant portions of my assessment and plan of care.    If you have questions, please do not hesitate to call me. I look forward to following Alon Adamson along with you.    Sincerely,    Miriam García MD    Enclosure    If you would like to receive this communication electronically, please contact [email or web address] or [phone] to request Onit Link access.    Onit Link is a tool which provides read-only access to select patient information with whom you have a relationship. Its easy to use and provides real time access to review your patients record including encounter summaries, notes, results, and demographic information.    If you feel you have received this communication in error or would no longer like to receive these types of communications, please e-mail [email or web address]

## 2018-09-27 NOTE — TELEPHONE ENCOUNTER
(LEAH) received a message from Carlton Cervantes that the patient (pt) was cleared to return home to [place] with HH P/T orders.  SW contacted the pt and spoke with his wife to verify they were advised of this information.  Pt's wife advised they would be checking out of the [facility] in the morning and were excited to be returning home.  SW advised the pt was given orders to receive HH P/T and inquired about the agency of their choice.  Pt's wife advised the pt wanted to receive services with [facility] located in [place], [place].  LEAH advised the agency would be contacted, all required documents would be sent on his behalf and his contact information would be provided for the agency to contact with regards to an appointment.  LEAH also reminded pt's wife to turn in the keys to the  prior to vacating the premises.  No other concerns or issues were addressed.    Dr. Miriam García's discharge note for today was incomplete; therefore, the referral for HH will be forwarded on tomorrow.  LEAH remains available.

## 2018-09-27 NOTE — PROGRESS NOTES
Transplant Hepatology  Liver Transplant Recipient Follow-up    Transplant Date: 2018  UNOS Native Liver Dx: Alcoholic Cirrhosis    Alon is here for follow up of his liver transplant.    ORGAN: LIVER  Whole or Partial: whole liver  Donor Type:  - brain death  CDC High Risk: no  Donor CMV Status: Negative  Donor HCV Status: Negative  Donor HBcAb: Negative  Biliary Anastomosis: end to end  Arterial Anatomy: standard  IVC reconstruction: end to end ivc  Portal vein status: partial patent  .    Subjective:     Interval History:  Currently, he is doing with difficulty. Current complaints include continue GI issues with anorexia and weight loss. He has lost about 100 pounds peritransplant, reports significant issues with N/V after transplant those have improved but still present. He also has early satiety.  Tried to at least drink supplement drinks.  Denies any significant abdominal pain. He is having regular BMs.    He also has renal issues after transplant requiring HD which is no longer needed but Cr and BUN still elevated.        Review of Systems   Constitutional: Positive for activity change (decreased), fatigue and unexpected weight change (loss).   HENT: Negative.    Eyes: Negative.    Respiratory: Negative.    Cardiovascular: Negative.    Gastrointestinal: Positive for abdominal pain, nausea and vomiting.   Genitourinary: Negative.    Musculoskeletal: Positive for arthralgias (significant shoulder issues) and myalgias.   Skin: Negative.    Neurological: Positive for weakness and numbness (in pinky fingers).   Psychiatric/Behavioral: Positive for dysphoric mood.       Objective:     Physical Exam   Constitutional: He is oriented to person, place, and time. No distress.   Muscle and temporal wasting   HENT:   Head: Normocephalic and atraumatic.   Mouth/Throat: Oropharynx is clear and moist. No oropharyngeal exudate.   Eyes: Conjunctivae are normal. Pupils are equal, round, and reactive to light. Right  eye exhibits no discharge. Left eye exhibits no discharge. No scleral icterus.   Pulmonary/Chest: Effort normal and breath sounds normal. No respiratory distress. He has no wheezes.   Abdominal: Soft. He exhibits no distension. There is no tenderness.   Musculoskeletal: He exhibits no edema.   Neurological: He is alert and oriented to person, place, and time.   Psychiatric:   depressed   Vitals reviewed.      Path 7/2018  FINAL PATHOLOGIC DIAGNOSIS  1. Donor liver (needle biopsy):  Microsteatosis, small drops, 60% (interpretation of quantity limited, glycogenated cytoplasm)  Rare glycogenated nuclei  Trichrome stain: No fibrosis (stage 0 out of 4)  Iron stain: Negative (0+ out of 4+)  Iron and trichrome stains with appropriate controls  2. Old liver (explant):  Negative for malignancy  Advanced stage chronic liver disease  Fibrosis staging: Cirrhosis (stage 4 out of 4)  Etiology: Clinical, alcohol  Other findings:  Portal vein with organized thrombus  3. Portal vein thrombus (excision):  Organized thrombus    WBC   Date Value Ref Range Status   09/27/2018 5.39 3.90 - 12.70 K/uL Final     Hemoglobin   Date Value Ref Range Status   09/27/2018 9.8 (L) 14.0 - 18.0 g/dL Final     POC Hematocrit   Date Value Ref Range Status   07/23/2018 25 (L) 36 - 54 %PCV Final     Hematocrit   Date Value Ref Range Status   09/27/2018 30.3 (L) 40.0 - 54.0 % Final     Platelets   Date Value Ref Range Status   09/27/2018 114 (L) 150 - 350 K/uL Final     BUN, Bld   Date Value Ref Range Status   09/27/2018 77 (H) 8 - 23 mg/dL Final     Creatinine   Date Value Ref Range Status   09/27/2018 2.4 (H) 0.5 - 1.4 mg/dL Final     Glucose   Date Value Ref Range Status   09/27/2018 138 (H) 70 - 110 mg/dL Final     Calcium   Date Value Ref Range Status   09/27/2018 10.3 8.7 - 10.5 mg/dL Final     Sodium   Date Value Ref Range Status   09/27/2018 138 136 - 145 mmol/L Final     Potassium   Date Value Ref Range Status   09/27/2018 5.1 3.5 - 5.1 mmol/L  Final     Chloride   Date Value Ref Range Status   09/27/2018 104 95 - 110 mmol/L Final     Magnesium   Date Value Ref Range Status   09/10/2018 2.1 1.6 - 2.6 mg/dL Final     AST   Date Value Ref Range Status   09/27/2018 11 10 - 40 U/L Final     ALT   Date Value Ref Range Status   09/27/2018 9 (L) 10 - 44 U/L Final     Alkaline Phosphatase   Date Value Ref Range Status   09/27/2018 69 55 - 135 U/L Final     Total Bilirubin   Date Value Ref Range Status   09/27/2018 0.6 0.1 - 1.0 mg/dL Final     Comment:     For infants and newborns, interpretation of results should be based  on gestational age, weight and in agreement with clinical  observations.  Premature Infant recommended reference ranges:  Up to 24 hours.............<8.0 mg/dL  Up to 48 hours............<12.0 mg/dL  3-5 days..................<15.0 mg/dL  6-29 days.................<15.0 mg/dL       Albumin   Date Value Ref Range Status   09/27/2018 4.0 3.5 - 5.2 g/dL Final     INR   Date Value Ref Range Status   07/31/2018 1.2 0.8 - 1.2 Final     Comment:     Coumadin Therapy:  2.0 - 3.0 for INR for all indicators except mechanical heart valves  and antiphospholipid syndromes which should use 2.5 - 3.5.       Lab Results   Component Value Date    TACROLIMUS 5.1 09/27/2018           Assessment/Plan:     1. Anorexia    2. Weight loss    3. Non-intractable vomiting with nausea, unspecified vomiting type    4. Bilateral leg edema    5. Immunosuppression    6. Liver transplanted      Anorexia/Weight loss-concerned about his degree of weight loss and its persistence. Unsure etiology will manage symptoms  -If not responsive to management will have to consider MRI imaging of abdomen and possible repeat EGD (although last EGD and colon were this year and unremarkable)  -     cyproheptadine (PERIACTIN) 4 mg tablet; Take 1 tablet (4 mg total) by mouth 3 (three) times daily.  Dispense: 42 tablet; Refill: 0      -     cyproheptadine (PERIACTIN) 4 mg tablet; Take 1 tablet (4  mg total) by mouth 3 (three) times daily.  Dispense: 42 tablet; Refill: 0    Non-intractable vomiting with nausea, unspecified vomiting type-uncertain if symptoms may be medication related  -     metoclopramide HCl (REGLAN) 5 MG tablet; Take 1 tablet (5 mg total) by mouth 3 (three) times daily before meals.  Dispense: 42 tablet; Refill: 0    Bilateral leg edema-improved no edema  -Decrease lasix to daily will continue to titrate down  -     furosemide (LASIX) 80 MG tablet; Take 1 tablet (80 mg total) by mouth once daily.  Dispense: 30 tablet; Refill: 2    Immunosuppression  -Continue current meds, unable to get off cellcept which could contribute to GI issues because of renal issues    Liver transplant-good allograft function  -Continue with routine labs  -OK to return home as I think this will help his depression and eating; can have HH and PT if needed at home    RTC in 2 weeks in [place]    Miriam García MD           [facility] Patient Status  Functional Status: 50% - Requires considerable assistance and frequent medical care  Physical Capacity: Limited Mobility

## 2018-09-28 ENCOUNTER — TELEPHONE (OUTPATIENT)
Dept: TRANSPLANT | Facility: HOSPITAL | Age: 63
End: 2018-09-28

## 2018-09-28 DIAGNOSIS — Z94.4 LIVER TRANSPLANTED: ICD-10-CM

## 2018-09-28 DIAGNOSIS — Z94.4 LIVER REPLACED BY TRANSPLANT: Primary | ICD-10-CM

## 2018-09-28 RX ORDER — TACROLIMUS 1 MG/1
2 CAPSULE ORAL EVERY 12 HOURS
Qty: 120 CAPSULE | Refills: 11 | Status: SHIPPED | OUTPATIENT
Start: 2018-09-28 | End: 2018-10-11 | Stop reason: DRUGHIGH

## 2018-09-28 NOTE — TELEPHONE ENCOUNTER
Attempted x 2 to return call to [facility] , line busy both times.

## 2018-09-28 NOTE — TELEPHONE ENCOUNTER
(LEAH) contact [facility] ([phone]) to advise of patient's HH referral and choice of their agency.  Marlin informed SW that their agency did not provide HH services as they were a medical equipment facility.  LEAH telephoned pt's wife to advise of the information.  Pt's wife stated she would contact her sister to inquire about the telephone number for [facility].      Pt's wife contacted LEAH again to advise she gave the incorrect name previously; however, they [facility] ( #[phone]). LEAH advised she would contact the agency to facilitate services. LEAH contacted [facility] ([address], [place]  [address]) and spoke with Enedelia with regards to the patient's referral.  LEAH was asked to fax all necessary paperwork to fax #[phone].  Paperwork faxed accordingly.   LEAH remains available.

## 2018-09-28 NOTE — TELEPHONE ENCOUNTER
----- Message from Francine De La Torre sent at 9/28/2018  9:12 AM CDT -----  Contact: [facility]  Needs Advice    Reason for call: Home nancy needs clarity concerning PT        Communication Preference: [place] [phone]    Additional Information:

## 2018-09-28 NOTE — TELEPHONE ENCOUNTER
Patient /wife notified and instructed: labs reviewed by ; no changes made . Repeat labs on Monday 10/1/18 at [facility]. Standing Lab order was faxed to them. She was able to repeat instructions and voiced understanding.

## 2018-09-28 NOTE — TELEPHONE ENCOUNTER
----- Message from Miriam García MD sent at 9/27/2018  3:26 PM CDT -----  Reviewed, nothing to do; repeat per routine

## 2018-10-02 ENCOUNTER — TELEPHONE (OUTPATIENT)
Dept: ADMINISTRATIVE | Facility: CLINIC | Age: 63
End: 2018-10-02

## 2018-10-02 DIAGNOSIS — R60.0 BILATERAL LEG EDEMA: ICD-10-CM

## 2018-10-02 LAB
EXT ALBUMIN: 3.5
EXT ALKALINE PHOSPHATASE: 70
EXT ALT: 11
EXT AST: 8
EXT BILIRUBIN TOTAL: 0.6
EXT BUN: 60
EXT CALCIUM: 8.9
EXT CHLORIDE: 104
EXT CO2: 29
EXT CREATININE: 2 MG/DL
EXT EOSINOPHIL%: 5
EXT GFR MDRD NON AF AMER: 34
EXT GLUCOSE: 141
EXT HEMATOCRIT: 30
EXT HEMOGLOBIN: 10.2
EXT LYMPH%: 5.2
EXT MONOCYTES%: 10.9
EXT PLATELETS: 91
EXT POTASSIUM: 4.9
EXT PROTEIN TOTAL: 6.6
EXT SEGS%: 78.4
EXT SODIUM: 139 MMOL/L
EXT TACROLIMUS LVL: 2.9
EXT WBC: 9.4

## 2018-10-02 RX ORDER — FUROSEMIDE 80 MG/1
80 TABLET ORAL DAILY
Qty: 30 TABLET | Refills: 2
Start: 2018-10-02 | End: 2018-10-04 | Stop reason: SINTOL

## 2018-10-04 ENCOUNTER — TELEPHONE (OUTPATIENT)
Dept: TRANSPLANT | Facility: CLINIC | Age: 63
End: 2018-10-04

## 2018-10-04 NOTE — TELEPHONE ENCOUNTER
Patient /wife notified and instructed: patient is to hold diuretics , watch for increase in swelling.  Repeat labs on Monday 10/8/18. She was able to repeat instructions and voiced understanding.

## 2018-10-04 NOTE — TELEPHONE ENCOUNTER
----- Message from Miriam García MD sent at 10/3/2018  6:44 PM CDT -----  Liver tests okay.  Kidney function stable from previous.  Discontinue all diuretics and have patient monitor for increasing edema.  Awaiting tacrolimus level.

## 2018-10-08 LAB
EXT ALBUMIN: 3.4
EXT ALKALINE PHOSPHATASE: 59
EXT ALT: 14
EXT AST: 15
EXT BILIRUBIN TOTAL: 0.3
EXT BUN: 34
EXT CALCIUM: 9.3
EXT CHLORIDE: 108
EXT CO2: 25
EXT CREATININE: 1.2 MG/DL
EXT EOSINOPHIL%: 3.2
EXT GFR MDRD NON AF AMER: >60
EXT GLUCOSE: 117
EXT HEMATOCRIT: 29.1
EXT HEMOGLOBIN: 10
EXT LYMPH%: 10.8
EXT MONOCYTES%: 12.4
EXT PLATELETS: 146
EXT POTASSIUM: 5.4
EXT PROTEIN TOTAL: 6.3
EXT SEGS%: 71.9
EXT SODIUM: 140 MMOL/L
EXT TACROLIMUS LVL: 2.9
EXT WBC: 5.8

## 2018-10-09 ENCOUNTER — TELEPHONE (OUTPATIENT)
Dept: TRANSPLANT | Facility: CLINIC | Age: 63
End: 2018-10-09

## 2018-10-09 NOTE — TELEPHONE ENCOUNTER
----- Message from Mariza Maher sent at 10/8/2018  5:12 PM CDT -----      ----- Message -----  From: Kellie Gutierrez  Sent: 10/8/2018  11:49 AM  To: [facility] Post-Liver Transplant Non-Clinical    Needs to sched 2 wk f/u appt from date of last ofc visit with Dr. García/pt was to be called with time and date of 2 wk f/u and has not been called or any appt scheduled for him until Nov    Contact [phone]

## 2018-10-09 NOTE — TELEPHONE ENCOUNTER
Spoke to patient; informed him that I have sent a message to 's  in [place] requesting sooner clinic appointment for 2 week follow up. He was able to voice understanding.

## 2018-10-10 ENCOUNTER — TELEPHONE (OUTPATIENT)
Dept: TRANSPLANT | Facility: CLINIC | Age: 63
End: 2018-10-10

## 2018-10-10 NOTE — TELEPHONE ENCOUNTER
----- Message from Mariza Maher sent at 10/10/2018  4:57 PM CDT -----  Contact: pt  I spoke to the patient's wife and gave her Izzy Spangler's name for her to call the [facility] to inquire about the appt that is supposed to be scheduled there    ----- Message -----  From: Santi Amaya  Sent: 10/10/2018   3:18 PM  To: [facility] Post-Liver Transplant Non-Clinical    Needs Advice    Reason for call: would like a a call regarding appt.         Communication Preference: [phone]     Additional Information:

## 2018-10-11 DIAGNOSIS — Z94.4 LIVER TRANSPLANTED: ICD-10-CM

## 2018-10-11 RX ORDER — TACROLIMUS 1 MG/1
3 CAPSULE ORAL EVERY 12 HOURS
Qty: 180 CAPSULE | Refills: 11 | Status: SHIPPED | OUTPATIENT
Start: 2018-10-11 | End: 2018-10-19 | Stop reason: SDUPTHER

## 2018-10-11 NOTE — TELEPHONE ENCOUNTER
Patient notified and instructed to increase Prograf dose to 3mg twice daily and repeat labs on Monday. He was able to repeat instructions and voiced understanding.

## 2018-10-11 NOTE — TELEPHONE ENCOUNTER
----- Message from Miriam García MD sent at 10/11/2018  9:39 AM CDT -----  Tacro level still low increase to 3mg twice a day and repeat labs next week.

## 2018-10-15 LAB
EXT ALBUMIN: 3.8
EXT ALKALINE PHOSPHATASE: 64
EXT ALT: 13
EXT AST: 11
EXT BILIRUBIN TOTAL: 0.3
EXT BUN: 36
EXT CALCIUM: 9.1
EXT CHLORIDE: 107
EXT CO2: 23
EXT CREATININE: 1.1 MG/DL
EXT EOSINOPHIL%: 4.1
EXT GFR MDRD NON AF AMER: >60
EXT GLUCOSE: 104
EXT HEMATOCRIT: 31
EXT HEMOGLOBIN: 10.4
EXT LYMPH%: 9.6
EXT MONOCYTES%: 11.1
EXT PLATELETS: 112
EXT POTASSIUM: 5.1
EXT PROTEIN TOTAL: 6.6
EXT SEGS%: 73.8
EXT SODIUM: 139 MMOL/L
EXT TACROLIMUS LVL: 4.2
EXT WBC: 6.4

## 2018-10-17 ENCOUNTER — TELEPHONE (OUTPATIENT)
Dept: TRANSPLANT | Facility: CLINIC | Age: 63
End: 2018-10-17

## 2018-10-17 NOTE — TELEPHONE ENCOUNTER
Patient notified and instructed: labs reviewed by ; no medicine changes made. Repeat labs due 10/29/18(every 2 weeks). Patient was able to repeat instructions and voiced understanding.

## 2018-10-17 NOTE — TELEPHONE ENCOUNTER
----- Message from Miriam García MD sent at 10/17/2018 12:40 PM CDT -----  Reviewed, nothing to do; repeat per routine

## 2018-10-19 DIAGNOSIS — Z94.4 LIVER TRANSPLANTED: ICD-10-CM

## 2018-10-19 RX ORDER — TACROLIMUS 1 MG/1
3 CAPSULE ORAL EVERY 12 HOURS
Qty: 180 CAPSULE | Refills: 11 | Status: SHIPPED | OUTPATIENT
Start: 2018-10-19 | End: 2019-01-07 | Stop reason: DRUGHIGH

## 2018-10-31 ENCOUNTER — TELEPHONE (OUTPATIENT)
Dept: TRANSPLANT | Facility: CLINIC | Age: 63
End: 2018-10-31

## 2018-10-31 NOTE — TELEPHONE ENCOUNTER
----- Message from Francine De La Torre sent at 10/31/2018  9:33 AM CDT -----  Contact: Patient  Needs Advice    Reason for call: patient would like to speak with Coordinator concerning a dentist appointment he has today at 2pm. He states he may have to get a few teeth pulled .         Communication Preference: [phone]    Additional Information:

## 2018-11-01 LAB
EXT ALBUMIN: 3.7
EXT ALKALINE PHOSPHATASE: 72
EXT ALT: 12
EXT AST: 13
EXT BILIRUBIN TOTAL: 0.5
EXT BUN: 28
EXT CALCIUM: 9
EXT CHLORIDE: 110
EXT CO2: 23
EXT CREATININE: 1.4 MG/DL
EXT GFR MDRD NON AF AMER: 51
EXT GLUCOSE: 108
EXT POTASSIUM: 5.7
EXT PROTEIN TOTAL: 6.6
EXT SODIUM: 141 MMOL/L
EXT TACROLIMUS LVL: 8.7

## 2018-11-01 NOTE — TELEPHONE ENCOUNTER
Patient notified and instructed of elevated potassium level on labs of 10/29/18 received today.   Low K+ diet instructions given to patient. And patient instructed to  Rx. For Sodium Polystyrine today ,and take it today. Repeat lab for potassium level tomorrow morning per Guidelines. Lab Order faxed to his local lab. Patient was able to repeat instructions and voiced understanding.

## 2018-11-02 ENCOUNTER — TELEPHONE (OUTPATIENT)
Dept: TRANSPLANT | Facility: CLINIC | Age: 63
End: 2018-11-02

## 2018-11-02 NOTE — TELEPHONE ENCOUNTER
Spoke to patient: Rx. For SOdium Polystyrine called to local Pharmacy doctor hotline. Patient instructed he can pick it up and take now, then repeat lab at ~ 3:30 PM today. He was able to voice understanding.

## 2018-11-02 NOTE — TELEPHONE ENCOUNTER
"Spoke to patient's lab, they reported he has not shown up today for repeat Potassium level.        Called and spoke to patient: he reported that he "did not receive the Kayexelate until 3pm".  Patient stated the the lab told him he could "still come between 7-8 PM tonight and they can draw his blood so that is what he said he is planning to do.         On Call Coordinator notified to check for result .     "

## 2018-11-02 NOTE — TELEPHONE ENCOUNTER
----- Message from Sarah Jenkins sent at 11/2/2018  8:27 AM CDT -----  Contact: Patient  Needs Advice    Reason for call: Pt prescription hasn't been sent in to the [facility] [place]    Pt need medication to take before getting labs done        Communication Preference: [phone]    Additional Information: Pt stated he spoke with Helen yesterday and was told his lab results showed his potassium was high and they prescribed a medication for him to take before getting labs done again to re-check his potassium

## 2018-11-03 ENCOUNTER — TELEPHONE (OUTPATIENT)
Dept: TRANSPLANT | Facility: CLINIC | Age: 63
End: 2018-11-03

## 2018-11-03 NOTE — TELEPHONE ENCOUNTER
Potassium dated 11/2/18 pm obtained from Kim at [facility]. Potassium was 5.3.  This was repeated back to Kim and confirmed.  Asked Kim to fax to liver transplant office.  As per Guideline LI-06 no intervention required. Patient aware of his f/u appointments.

## 2018-11-05 LAB — EXT POTASSIUM: 5.3

## 2018-11-08 ENCOUNTER — TELEPHONE (OUTPATIENT)
Dept: TRANSPLANT | Facility: CLINIC | Age: 63
End: 2018-11-08

## 2018-11-08 NOTE — TELEPHONE ENCOUNTER
----- Message from BARBARA Briscoe sent at 11/5/2018  2:38 PM CST -----  Potassium has improved.  Continue labs per routine.

## 2018-11-13 LAB
EXT ALBUMIN: 3.4
EXT ALKALINE PHOSPHATASE: 69
EXT ALT: 11
EXT AST: 6
EXT BILIRUBIN TOTAL: 0.3
EXT BUN: 30
EXT CALCIUM: 8.5
EXT CHLORIDE: 110
EXT CO2: 26
EXT CREATININE: 1.7 MG/DL
EXT EOSINOPHIL%: 3.5
EXT GFR MDRD NON AF AMER: 41
EXT GLUCOSE: 120
EXT HEMATOCRIT: 30.6
EXT HEMOGLOBIN: 10.4
EXT LYMPH%: 15.1
EXT MONOCYTES%: 12.6
EXT PLATELETS: 103
EXT POTASSIUM: 5.4
EXT PROTEIN TOTAL: 6.1
EXT SEGS%: 67.6
EXT SODIUM: 142 MMOL/L
EXT TACROLIMUS LVL: 6.4
EXT WBC: 4.5

## 2018-11-14 ENCOUNTER — TELEPHONE (OUTPATIENT)
Dept: TRANSPLANT | Facility: CLINIC | Age: 63
End: 2018-11-14

## 2018-11-14 NOTE — TELEPHONE ENCOUNTER
----- Message from Miriam García MD sent at 11/14/2018 10:49 AM CST -----  Reviewed, nothing to do; repeat per routine

## 2018-11-15 ENCOUNTER — HOSPITAL ENCOUNTER (OUTPATIENT)
Dept: RADIOLOGY | Facility: HOSPITAL | Age: 63
Discharge: HOME | End: 2018-11-15
Attending: INTERNAL MEDICINE
Payer: MEDICARE

## 2018-11-15 ENCOUNTER — OFFICE VISIT (OUTPATIENT)
Dept: TRANSPLANT | Facility: CLINIC | Age: 63
End: 2018-11-15
Payer: MEDICARE

## 2018-11-15 VITALS
DIASTOLIC BLOOD PRESSURE: 88 MMHG | OXYGEN SATURATION: 100 % | HEIGHT: 75 IN | TEMPERATURE: 97 F | BODY MASS INDEX: 26.97 KG/M2 | RESPIRATION RATE: 18 BRPM | WEIGHT: 216.94 LBS | SYSTOLIC BLOOD PRESSURE: 143 MMHG | HEART RATE: 77 BPM

## 2018-11-15 DIAGNOSIS — Z94.4 LIVER TRANSPLANTED: ICD-10-CM

## 2018-11-15 DIAGNOSIS — N28.9 RENAL INSUFFICIENCY: ICD-10-CM

## 2018-11-15 DIAGNOSIS — Z94.4 LIVER REPLACED BY TRANSPLANT: ICD-10-CM

## 2018-11-15 DIAGNOSIS — D84.9 IMMUNOSUPPRESSION: ICD-10-CM

## 2018-11-15 DIAGNOSIS — Z94.4 LIVER REPLACED BY TRANSPLANT: Primary | ICD-10-CM

## 2018-11-15 DIAGNOSIS — R63.0 ANOREXIA: Primary | ICD-10-CM

## 2018-11-15 PROBLEM — T38.0X5A STEROID-INDUCED HYPERGLYCEMIA: Status: RESOLVED | Noted: 2018-07-24 | Resolved: 2018-11-15

## 2018-11-15 PROBLEM — R11.2 NAUSEA AND VOMITING: Status: RESOLVED | Noted: 2018-07-21 | Resolved: 2018-11-15

## 2018-11-15 PROBLEM — R73.9 STEROID-INDUCED HYPERGLYCEMIA: Status: RESOLVED | Noted: 2018-07-24 | Resolved: 2018-11-15

## 2018-11-15 PROCEDURE — 93975 VASCULAR STUDY: CPT | Mod: TC

## 2018-11-15 PROCEDURE — 99213 OFFICE O/P EST LOW 20 MIN: CPT | Mod: S$PBB,,, | Performed by: INTERNAL MEDICINE

## 2018-11-15 PROCEDURE — 93975 VASCULAR STUDY: CPT | Mod: 26,,, | Performed by: RADIOLOGY

## 2018-11-15 PROCEDURE — 76705 ECHO EXAM OF ABDOMEN: CPT | Mod: 26,59,, | Performed by: RADIOLOGY

## 2018-11-15 PROCEDURE — 99999 PR PBB SHADOW E&M-EST. PATIENT-LVL III: CPT | Mod: PBBFAC,,, | Performed by: INTERNAL MEDICINE

## 2018-11-15 PROCEDURE — 99213 OFFICE O/P EST LOW 20 MIN: CPT | Mod: PBBFAC,25 | Performed by: INTERNAL MEDICINE

## 2018-11-15 NOTE — PROGRESS NOTES
Transplant Hepatology  Liver Transplant Recipient Follow-up    Transplant Date: 2018  UNOS Native Liver Dx: Alcoholic Cirrhosis    Alon is here for follow up of his liver transplant.    ORGAN: LIVER  Whole or Partial: whole liver  Donor Type:  - brain death  CDC High Risk: no  Donor CMV Status: Negative  Donor HCV Status: Negative  Donor HBcAb: Negative  Biliary Anastomosis: end to end  Arterial Anatomy: standard  IVC reconstruction: end to end ivc  Portal vein status: partial patent  .    Subjective:     Interval History:  Currently, he is doing adequately. Current complaints include still having issues with his shoulder as well as elbow.  He is getting physical therapy for that.  He could not tolerate the Periactin as it caused him to be too sleepy.  His appetite has started to improve but is not where he would like it to be.  Of note he has gained about 12 lb since last visit.  Overall it seems like his health is improving be does have continued issues with sleeping at night because of his musculoskeletal problems.  He seems to be much happier now that he is return home.        Review of Systems   Constitutional: Positive for activity change (improved but not where he would like it), appetite change (decreased) and unexpected weight change (gain).   HENT: Positive for dental problem.    Eyes: Negative.    Respiratory: Negative.    Cardiovascular: Negative.    Gastrointestinal: Negative.    Genitourinary: Negative.    Musculoskeletal: Positive for arthralgias, gait problem and myalgias.   Skin: Negative.    Psychiatric/Behavioral: Positive for sleep disturbance.       Objective:     Physical Exam   Constitutional: He is oriented to person, place, and time. No distress.   Temporal wasting   HENT:   Head: Normocephalic and atraumatic.   Mouth/Throat: Oropharynx is clear and moist. No oropharyngeal exudate.   Poor dentition   Eyes: Conjunctivae are normal. Pupils are equal, round, and reactive to  light. Right eye exhibits no discharge. Left eye exhibits no discharge. No scleral icterus.   Pulmonary/Chest: Effort normal and breath sounds normal. No respiratory distress. He has no wheezes.   Abdominal: Soft. He exhibits no distension. There is no tenderness.   Musculoskeletal: He exhibits no edema.   Neurological: He is alert and oriented to person, place, and time.   Psychiatric: He has a normal mood and affect. His behavior is normal.   Vitals reviewed.          WBC   Date Value Ref Range Status   09/27/2018 5.39 3.90 - 12.70 K/uL Final     Hemoglobin   Date Value Ref Range Status   09/27/2018 9.8 (L) 14.0 - 18.0 g/dL Final     POC Hematocrit   Date Value Ref Range Status   07/23/2018 25 (L) 36 - 54 %PCV Final     Hematocrit   Date Value Ref Range Status   09/27/2018 30.3 (L) 40.0 - 54.0 % Final     Platelets   Date Value Ref Range Status   09/27/2018 114 (L) 150 - 350 K/uL Final     BUN, Bld   Date Value Ref Range Status   09/27/2018 77 (H) 8 - 23 mg/dL Final     Creatinine   Date Value Ref Range Status   09/27/2018 2.4 (H) 0.5 - 1.4 mg/dL Final     Glucose   Date Value Ref Range Status   09/27/2018 138 (H) 70 - 110 mg/dL Final     Calcium   Date Value Ref Range Status   09/27/2018 10.3 8.7 - 10.5 mg/dL Final     Sodium   Date Value Ref Range Status   09/27/2018 138 136 - 145 mmol/L Final     Potassium   Date Value Ref Range Status   09/27/2018 5.1 3.5 - 5.1 mmol/L Final     Chloride   Date Value Ref Range Status   09/27/2018 104 95 - 110 mmol/L Final     Magnesium   Date Value Ref Range Status   09/10/2018 2.1 1.6 - 2.6 mg/dL Final     AST   Date Value Ref Range Status   09/27/2018 11 10 - 40 U/L Final     ALT   Date Value Ref Range Status   09/27/2018 9 (L) 10 - 44 U/L Final     Alkaline Phosphatase   Date Value Ref Range Status   09/27/2018 69 55 - 135 U/L Final     Total Bilirubin   Date Value Ref Range Status   09/27/2018 0.6 0.1 - 1.0 mg/dL Final     Comment:     For infants and newborns,  interpretation of results should be based  on gestational age, weight and in agreement with clinical  observations.  Premature Infant recommended reference ranges:  Up to 24 hours.............<8.0 mg/dL  Up to 48 hours............<12.0 mg/dL  3-5 days..................<15.0 mg/dL  6-29 days.................<15.0 mg/dL       Albumin   Date Value Ref Range Status   09/27/2018 4.0 3.5 - 5.2 g/dL Final     INR   Date Value Ref Range Status   07/31/2018 1.2 0.8 - 1.2 Final     Comment:     Coumadin Therapy:  2.0 - 3.0 for INR for all indicators except mechanical heart valves  and antiphospholipid syndromes which should use 2.5 - 3.5.       Lab Results   Component Value Date    TACROLIMUS 5.1 09/27/2018           Assessment/Plan:     1. Anorexia    2. Renal insufficiency    3. Immunosuppression    4. Liver transplanted      Anorexia-seems to be slowly improving.  Patient has gained a good amount of weight since last visit    Renal insufficiency-slight increase in creatinine.  Diuretics have been discontinued.  -advised to continue with adequate water intake    Immunosuppression  -continue with current immunosuppression    Liver transplanted-good allograft function  -continue with labs per routine    Return to clinic in 3 months      Miriam García MD         [facility] Patient Status  Functional Status: 60% - Requires occasional assistance but is able to care for needs  Physical Capacity: Limited Mobility

## 2018-11-23 DIAGNOSIS — Z94.4 LIVER REPLACED BY TRANSPLANT: ICD-10-CM

## 2018-11-24 RX ORDER — MYCOPHENOLATE MOFETIL 250 MG/1
500 CAPSULE ORAL 2 TIMES DAILY
Qty: 120 CAPSULE | Refills: 2 | Status: SHIPPED | OUTPATIENT
Start: 2018-11-24 | End: 2019-02-27

## 2018-11-26 LAB
EXT ALBUMIN: 3.5
EXT ALKALINE PHOSPHATASE: 73
EXT ALT: 12
EXT AST: 8
EXT BILIRUBIN TOTAL: 0.4
EXT BUN: 28
EXT CALCIUM: 9
EXT CHLORIDE: 110
EXT CO2: 24
EXT CREATININE: 1.5 MG/DL
EXT EOSINOPHIL%: 2.2
EXT GFR MDRD NON AF AMER: 47
EXT GLUCOSE: 113
EXT HEMATOCRIT: 33.7
EXT HEMOGLOBIN: 11.5
EXT LYMPH%: 12.9
EXT MONOCYTES%: 11.4
EXT PLATELETS: 106
EXT POTASSIUM: 5.7
EXT PROTEIN TOTAL: 6.4
EXT SEGS%: 72.8
EXT SODIUM: 142 MMOL/L
EXT TACROLIMUS LVL: 9.8
EXT WBC: 4.7

## 2018-12-05 NOTE — TELEPHONE ENCOUNTER
Patient notified and instructed: needs Kayexelate (Rx. To be sent to local [facility] per patient request) . Reviewed low K+ diet with patient.  Repeat labs on Monday 12/10/18. Patient able to repeat instructions and voiced understanding.

## 2018-12-05 NOTE — TELEPHONE ENCOUNTER
Patient notified and instructed that Cefalexin is ok to take  From liver transplant standpoint but speak to the doctor as to why it was given because a cold is usually viral and doesn't need antibiotic, but if MD is treating a bacterial infection then it is ok to take this antibiotic for that. Patient agreed to speak to his doctor about it.   Instructed patient that I will review with  re: upcoming surgery to see if that is ok at this time.  Patient voiced understanding.

## 2018-12-05 NOTE — TELEPHONE ENCOUNTER
----- Message from Cecilia Beard RN sent at 12/5/2018  1:14 PM CST -----      ----- Message -----  From: Miriam García MD  Sent: 12/5/2018   1:07 PM  To: [facility] Pre-Liver Transplant Clinical    Liver tests ok, potassium elevated, needs kayexalate. Tacro slightly higher repeat labs next week

## 2018-12-05 NOTE — TELEPHONE ENCOUNTER
----- Message from Umm Robledo sent at 12/5/2018  1:28 PM CST -----  Contact: Patient  Needs Advice    Reason for call: Patient was prescribed Cephalexin 500 Mg Cap (twice a day) and wants to make sure it's safe to take to treat a cold.  Also, he's schedule to have surgery next week (pt stated it's to move nerves around in his elbow) and wants to confirm if that's ok to do so.         Communication Preference: [phone].    Additional Information: Patient is currently in route to rehab, he may not answer, but please leave a voice message.

## 2018-12-10 LAB
EXT ALBUMIN: 3.5
EXT ALKALINE PHOSPHATASE: 75
EXT ALT: 10
EXT AST: 10
EXT BILIRUBIN TOTAL: 0.5
EXT BUN: 33
EXT CALCIUM: 9.1
EXT CHLORIDE: 108
EXT CO2: 27
EXT CREATININE: 1.6 MG/DL
EXT EOSINOPHIL%: 2.1
EXT GFR MDRD NON AF AMER: 44
EXT GLUCOSE: 113
EXT HEMATOCRIT: 33.4
EXT HEMOGLOBIN: 11.4
EXT LYMPH%: 13.1
EXT MONOCYTES%: ABNORMAL
EXT PLATELETS: 126
EXT POTASSIUM: 5.7
EXT PROTEIN TOTAL: 6.5
EXT SEGS%: 71.7
EXT SODIUM: 139 MMOL/L
EXT TACROLIMUS LVL: 7.5
EXT WBC: 4.7

## 2018-12-14 ENCOUNTER — TELEPHONE (OUTPATIENT)
Dept: TRANSPLANT | Facility: CLINIC | Age: 63
End: 2018-12-14

## 2018-12-14 NOTE — TELEPHONE ENCOUNTER
----- Message from Miriam García MD sent at 12/14/2018  9:03 AM CST -----  Contact: Patient  We prefer he usually wait 6 months out of transplant have any elective surgeries.  ----- Message -----  From: Helen Calzada  Sent: 12/5/2018   1:39 PM  To: Miriam García MD    Is this surgery ok for him ? Thanks.  ----- Message -----  From: Umm Venkat  Sent: 12/5/2018   1:28 PM  To: [facility] Post-Liver Transplant Clinical    Needs Advice    Reason for call: Patient was prescribed Cephalexin 500 Mg Cap (twice a day) and wants to make sure it's safe to take to treat a cold.  Also, he's schedule to have surgery next week (pt stated it's to move nerves around in his elbow) and wants to confirm if that's ok to do so.         Communication Preference: [phone].    Additional Information: Patient is currently in route to rehab, he may not answer, but please leave a voice message.

## 2018-12-14 NOTE — TELEPHONE ENCOUNTER
Patient notified and instructed ; he should wait until 6 months post transplant to schedule any elective surgeries, he was able to voice understanding.    Patient instructed: labs reviewed by ; needs dose of Kayexelate (Rx. Sent to his local lab); repeat labs due 12/17/18. He was able to voice understanding.

## 2018-12-18 ENCOUNTER — TELEPHONE (OUTPATIENT)
Dept: TRANSPLANT | Facility: CLINIC | Age: 63
End: 2018-12-18

## 2018-12-18 NOTE — TELEPHONE ENCOUNTER
"Labs of 12/17/18 requested from patient's local lab, they sent labs of 12/10.  Called and spoke to patient; he reported that he did not remember anyone telling him to go to lab this week, he said. He said " I go every 2 weeks."  Patient instructed that as per our phone call last week he was to have repeat labs on 12/17 to f/u of labs with potassium level after taking the Kayexelate. He reported that he did take the Kayexelate medicine , and appologized for missing labs yesterday. He will go to lab tomorrow morning, he said.  Lab order faxed to his local lab.   "

## 2018-12-19 LAB
EXT ALBUMIN: 3.5
EXT ALKALINE PHOSPHATASE: 79
EXT ALT: 13
EXT AST: 9
EXT BILIRUBIN TOTAL: 0.4
EXT BUN: 32
EXT CALCIUM: 9
EXT CHLORIDE: 108
EXT CO2: 25
EXT CREATININE: 1.5 MG/DL
EXT EOSINOPHIL%: 3.8
EXT GFR MDRD NON AF AMER: 47
EXT GLUCOSE: 101
EXT HEMATOCRIT: 33.6
EXT HEMOGLOBIN: 11.4
EXT LYMPH%: 14.9
EXT MONOCYTES%: 11.8
EXT PLATELETS: 124
EXT POTASSIUM: 5.3
EXT PROTEIN TOTAL: 6.5
EXT SEGS%: 68.2
EXT SODIUM: 139 MMOL/L
EXT TACROLIMUS LVL: 9
EXT WBC: 4.7

## 2018-12-27 ENCOUNTER — TELEPHONE (OUTPATIENT)
Dept: TRANSPLANT | Facility: CLINIC | Age: 63
End: 2018-12-27

## 2018-12-27 NOTE — TELEPHONE ENCOUNTER
----- Message from Miriam García MD sent at 12/27/2018 12:46 PM CST -----  Reviewed, nothing to do; repeat per routine

## 2018-12-31 DIAGNOSIS — Z94.4 LIVER REPLACED BY TRANSPLANT: ICD-10-CM

## 2018-12-31 LAB
EXT ALBUMIN: 3.6
EXT ALKALINE PHOSPHATASE: 82
EXT ALT: 9
EXT AST: 9
EXT BILIRUBIN TOTAL: 0.5
EXT BUN: 39
EXT CALCIUM: 9
EXT CHLORIDE: 107
EXT CO2: 25
EXT CREATININE: 1.9 MG/DL
EXT EOSINOPHIL%: 3.6
EXT GFR MDRD NON AF AMER: 36
EXT GLUCOSE: 142
EXT HEMATOCRIT: 33.2
EXT HEMOGLOBIN: 11.5
EXT LYMPH%: 14.1
EXT MONOCYTES%: 10.7
EXT PLATELETS: 113
EXT POTASSIUM: 5.9
EXT PROTEIN TOTAL: 6.7
EXT SEGS%: 70.5
EXT SODIUM: 138 MMOL/L
EXT TACROLIMUS LVL: 8.3
EXT WBC: 4.3

## 2018-12-31 RX ORDER — SULFAMETHOXAZOLE AND TRIMETHOPRIM 400; 80 MG/1; MG/1
1 TABLET ORAL DAILY
Qty: 30 TABLET | Refills: 0 | Status: SHIPPED | OUTPATIENT
Start: 2018-12-31 | End: 2019-01-24

## 2019-01-02 NOTE — TELEPHONE ENCOUNTER
----- Message from Miriam García MD sent at 1/2/2019 11:13 AM CST -----  Potassium is high.  Needs Kayexalate.  Need to see if patient is taking Veltassa daily.  Kidney function is also elevated suspect tacrolimus level will be high.  Repeat labs tomorrow.

## 2019-01-02 NOTE — TELEPHONE ENCOUNTER
veltassa Rx. to be sent to Pharmacy; patient was instructed on 12/31/18 to take Kayexelate 30 gms. And repeat labs 1/2/19.

## 2019-01-03 LAB
EXT ALBUMIN: 3.8
EXT ALKALINE PHOSPHATASE: 84
EXT ALT: 11
EXT AST: 8
EXT BILIRUBIN TOTAL: 0.4
EXT BUN: 40
EXT CALCIUM: 8.9
EXT CHLORIDE: 105
EXT CO2: 26
EXT CREATININE: 1.6 MG/DL
EXT EOSINOPHIL%: 4.5
EXT GFR MDRD NON AF AMER: 44
EXT GLUCOSE: 129
EXT HEMATOCRIT: 34
EXT HEMOGLOBIN: 11.5
EXT LYMPH%: 14.3
EXT MONOCYTES%: 12.7
EXT PLATELETS: 127
EXT POTASSIUM: 5.6
EXT PROGRAF LEVEL: 7.5
EXT PROTEIN TOTAL: 6.7
EXT SEGS%: 67.4
EXT SODIUM: 136 MMOL/L
EXT WBC: 4.9

## 2019-01-07 ENCOUNTER — TELEPHONE (OUTPATIENT)
Dept: PHARMACY | Facility: CLINIC | Age: 64
End: 2019-01-07

## 2019-01-07 DIAGNOSIS — Z94.4 LIVER TRANSPLANTED: ICD-10-CM

## 2019-01-07 RX ORDER — TACROLIMUS 1 MG/1
CAPSULE ORAL
Qty: 150 CAPSULE | Refills: 11 | Status: SHIPPED | OUTPATIENT
Start: 2019-01-07 | End: 2020-01-13

## 2019-01-07 NOTE — TELEPHONE ENCOUNTER
----- Message from Miriam García MD sent at 1/4/2019  7:24 PM CST -----  Decreased tacrolimus to 3 mg in the morning and 2 mg in the evening.  Repeat labs next week

## 2019-01-07 NOTE — TELEPHONE ENCOUNTER
Patient was notified and instructed to change Prograf dose to 3mg in AM and 2mg in PM; repeat labs this week (on 1/9/19). He was able to repeat instructions and voiced understanding.

## 2019-01-09 LAB
EXT ALBUMIN: 3.6
EXT ALKALINE PHOSPHATASE: 77
EXT ALT: 9
EXT AST: 11
EXT BILIRUBIN TOTAL: 0.4
EXT BUN: 28
EXT CALCIUM: 8.7
EXT CHLORIDE: 110
EXT CO2: 24
EXT CREATININE: 1.7 MG/DL
EXT EOSINOPHIL%: 3.5
EXT GFR MDRD NON AF AMER: 41
EXT GLUCOSE: 98
EXT HEMATOCRIT: 31.8
EXT HEMOGLOBIN: 10.9
EXT LYMPH%: 16.5
EXT MONOCYTES%: 11.2
EXT PLATELETS: 112
EXT POTASSIUM: 5.1
EXT PROTEIN TOTAL: 6.3
EXT SEGS%: 67.8
EXT SODIUM: 140 MMOL/L
EXT TACROLIMUS LVL: 7.9
EXT WBC: 3.9

## 2019-01-10 NOTE — TELEPHONE ENCOUNTER
Documentation Only:  Faxed prior authorization for Veltassa to insurance company for review on 1.10.2019 as URGENT AKF 8:56am

## 2019-01-11 NOTE — TELEPHONE ENCOUNTER
Documentation Only:    Prior Authorization for Harsha has been approved for 1 year    Approval dates: 11.01.2018 until 1.11.2020    Patient co-pay: $427.42    Patient Assistance IS required.    Forwarding to financial assistance for review    AKF 10:16am

## 2019-01-16 ENCOUNTER — TELEPHONE (OUTPATIENT)
Dept: TRANSPLANT | Facility: CLINIC | Age: 64
End: 2019-01-16

## 2019-01-16 NOTE — TELEPHONE ENCOUNTER
----- Message from Miriam García MD sent at 1/11/2019  4:32 PM CST -----  Reviewed, nothing to do; repeat per routine

## 2019-01-16 NOTE — TELEPHONE ENCOUNTER
Patient notified and instructed: labs reviewed, no medicine changes made. Repeat labs due 1/21/19. Patient able to voice understanding.

## 2019-01-17 NOTE — TELEPHONE ENCOUNTER
Contacted patient regarding Veltassa prescription and high copay. He gave permission and provided necessary info to apply for and obtain margot through [employer]. [facility] will follow up to schedule consultation and delivery.

## 2019-01-17 NOTE — TELEPHONE ENCOUNTER
FOR DOCUMENTATION ONLY:  Financial Assistance for Priyankjoanneluis alfredo approved from 10/19/18 to 1/16/20  Source: [facility]  BIN: [id number]  LINETTEN: RETA  ID: [id number]  Group: [id number]  $1,600 Award

## 2019-01-21 ENCOUNTER — TELEPHONE (OUTPATIENT)
Dept: PHARMACY | Facility: CLINIC | Age: 64
End: 2019-01-21

## 2019-01-21 NOTE — TELEPHONE ENCOUNTER
Veltassa refill and f/u attempted (1st fill at [facility]; initial fill at 002). No answer. LVM for call back.

## 2019-01-22 LAB
EXT ALBUMIN: 3.7
EXT ALKALINE PHOSPHATASE: 79
EXT ALT: 11
EXT AST: 12
EXT BILIRUBIN TOTAL: 0.4
EXT BUN: 30
EXT CALCIUM: 8.6
EXT CHLORIDE: 112
EXT CO2: 23
EXT CREATININE: 1.7 MG/DL
EXT EOSINOPHIL%: 4.1
EXT GFR MDRD NON AF AMER: 41
EXT GLUCOSE: 110
EXT HEMATOCRIT: 32.3
EXT HEMOGLOBIN: 11.1
EXT LYMPH%: 14.1
EXT MONOCYTES%: 12.4
EXT PLATELETS: 112
EXT POTASSIUM: 5.7
EXT PROTEIN TOTAL: 6.6
EXT SEGS%: 68.3
EXT SODIUM: 141 MMOL/L
EXT TACROLIMUS LVL: 6.7
EXT WBC: 4.3

## 2019-01-24 NOTE — TELEPHONE ENCOUNTER
Patient reported that he stopped the Bactrim as of 1/21/19. Patient stated he did not ever receive the Valtessa from the [facility], but said it cost 400.00.  Patient requested Rx. For Kayexelate be re-sent to [facility].  Will repeat labs on Monday, he said.

## 2019-01-24 NOTE — TELEPHONE ENCOUNTER
----- Message from Miriam García MD sent at 1/24/2019 12:48 PM CST -----  Potassium is still a problem.  Patient should be taking Veltassa.  Needs Kayexalate for more leave media treatment.  Patient should have stopped Bactrim which may help with potassium.  Repeat labs.

## 2019-01-25 NOTE — TELEPHONE ENCOUNTER
Initial Medication Consultation: Veltassa     Initial Veltassa consult completed on . Veltassa will be shipped on  to arrive at patient's home on  via [employer]. $0.00 copay. Patient will start Veltassa on . Address confirmed, CC on file. Confirmed 2 patient identifiers - name and . Therapy Appropriate.    Veltassa 8.4 grams:  Take 1 packet by mouth daily   -VELTASSA should be taken once daily mixed with a  Minimum of 1/3 cup of water, preferably at the same time each day. Do NOT heat or mix with warm or hot drinks.   -VELTASSA should be taken with food.  - Store refrigerated for best stability. If stored at room temperature, must be discarded after 90 days.     DDIs: Medication list reviewed. No DDIs or allergies.  Separate other oral medications by 3 hours.   Common side effects:  Diarrhea/constipation, nausea, upset stomach, gas.   More serious side effects should be reported to MD: signs of allergic reaction, like rash; hives; itching; red, swollen, blistered, or peeling skin with or without fever; wheezing; tightness in the chest or throat; trouble breathing or talking; unusual hoarseness; or swelling of the mouth, face, lips, tongue, or throat OR Signs of low magnesium levels like mood changes, muscle pain or weakness, muscle cramps or spasms, seizures, shakiness, not hungry, very bad upset stomach or throwing up, or a heartbeat that does not feel normal    Discussed the importance of staying well hydrated while on therapy. Compliance stressed - patient to take missed doses as soon as remembered, but NOT to take 2 doses in one day. Patient will report questions or concerns to myself or practitioner. Patient verbalizes understanding. Patient plans to start Veltassa on . Consultation included: indication; goals of treatment; administration; storage and handling; side effects; how to handle side effects; the importance of compliance; how to handle missed doses; the importance of laboratory  monitoring; the importance of keeping all follow up appointments.  Patient understands to report any medication changes to [facility] and provider. All questions answered and addressed to patients satisfaction.  I will f/u with patient in 1-2 weeks from start, and [facility] will contact patient in 3 weeks to coordinate next refill.    Teri Wu, PharmD, [facility]  Clinical Pharmacist   [facility]  Phone: [phone]

## 2019-01-28 LAB
EXT ALBUMIN: 3.5
EXT ALKALINE PHOSPHATASE: 78
EXT ALT: 8
EXT AST: 8
EXT BILIRUBIN TOTAL: 0.3
EXT BUN: 30
EXT CALCIUM: 8.6
EXT CHLORIDE: 108
EXT CO2: 26
EXT CREATININE: 1.3 MG/DL
EXT GFR MDRD NON AF AMER: 56
EXT GLUCOSE: 113
EXT POTASSIUM: 5.4
EXT PROGRAF LEVEL: 8.5
EXT PROTEIN TOTAL: 6.2
EXT SODIUM: 139 MMOL/L

## 2019-02-11 LAB
EXT ALBUMIN: 3.5
EXT ALKALINE PHOSPHATASE: 85
EXT ALT: 11
EXT AST: 8
EXT BILIRUBIN TOTAL: 0.4
EXT BUN: 36
EXT CALCIUM: 8.6
EXT CHLORIDE: 108
EXT CO2: 24
EXT CREATININE: 1.4 MG/DL
EXT EOSINOPHIL%: 3.6
EXT GFR MDRD NON AF AMER: 51
EXT GLUCOSE: 113
EXT HEMATOCRIT: 33.7
EXT HEMOGLOBIN: 11.2
EXT LYMPH%: 12.5
EXT MONOCYTES%: 10.4
EXT PLATELETS: 114
EXT POTASSIUM: 5.2
EXT PROTEIN TOTAL: 6.6
EXT SEGS%: 72.6
EXT SODIUM: 142 MMOL/L
EXT TACROLIMUS LVL: 8
EXT WBC: 4.6

## 2019-02-13 ENCOUNTER — TELEPHONE (OUTPATIENT)
Dept: TRANSPLANT | Facility: CLINIC | Age: 64
End: 2019-02-13

## 2019-02-13 NOTE — TELEPHONE ENCOUNTER
----- Message from Miriam García MD sent at 2/13/2019 10:16 AM CST -----  Reviewed, nothing to do; repeat per routine

## 2019-02-18 ENCOUNTER — TELEPHONE (OUTPATIENT)
Dept: TRANSPLANT | Facility: CLINIC | Age: 64
End: 2019-02-18

## 2019-02-18 NOTE — TELEPHONE ENCOUNTER
----- Message from Francine De La Torre sent at 2/18/2019  2:51 PM CST -----  Contact: Patient   Needs Advice     Reason for call: patient states he has been awaiting a call to reschedule appointment         Communication Preference: [phone]    Additional Information: n/a

## 2019-02-20 ENCOUNTER — TELEPHONE (OUTPATIENT)
Dept: PHARMACY | Facility: CLINIC | Age: 64
End: 2019-02-20

## 2019-02-20 NOTE — TELEPHONE ENCOUNTER
Veltassa refill confirmed. We will ship Veltassa refill on  via fedex to arrive on . $0.00 copay- 004. Confirmed 2 patient identifiers - name and .     Patient has 10 doses of Veltassa remaining and takes it around lunchtime daily.  Pt reports they are not having any side effects so far. No missed doses, no new medications, no new allergies or health conditions reported at this time. Mr. Adamson is not having complications with dissolving Veltassa with water.  Advised him to not use substances like milk (high potassium and opaque) when dissolving Vetlassa.  He is setting alarms as a reminder to take Veltassa at lunch. All questions answered and addressed to patients satisfaction. Pt verbalized understanding.

## 2019-02-27 DIAGNOSIS — Z94.4 LIVER REPLACED BY TRANSPLANT: ICD-10-CM

## 2019-02-28 RX ORDER — MYCOPHENOLATE MOFETIL 250 MG/1
500 CAPSULE ORAL 2 TIMES DAILY
Qty: 120 CAPSULE | Refills: 2 | Status: SHIPPED | OUTPATIENT
Start: 2019-02-28 | End: 2019-03-29 | Stop reason: ALTCHOICE

## 2019-03-04 LAB
EXT ALBUMIN: 3.6
EXT ALKALINE PHOSPHATASE: 94
EXT ALT: 10
EXT AST: 9
EXT BILIRUBIN TOTAL: 0.4
EXT BUN: 29
EXT CALCIUM: 8.5
EXT CHLORIDE: 106
EXT CO2: 26
EXT CREATININE: 1.5 MG/DL
EXT EOSINOPHIL%: 2.5
EXT GFR MDRD NON AF AMER: 47
EXT GLUCOSE: 132
EXT HEMATOCRIT: 33.6
EXT HEMOGLOBIN: 11.4
EXT LYMPH%: 11.7
EXT MONOCYTES%: 12
EXT PLATELETS: 133
EXT POTASSIUM: 5.1
EXT PROTEIN TOTAL: 7
EXT SEGS%: 72.8
EXT SODIUM: 139 MMOL/L
EXT TACROLIMUS LVL: 6.6
EXT WBC: 5.9

## 2019-03-08 ENCOUNTER — TELEPHONE (OUTPATIENT)
Dept: TRANSPLANT | Facility: CLINIC | Age: 64
End: 2019-03-08

## 2019-03-08 NOTE — TELEPHONE ENCOUNTER
----- Message from Miriam García MD sent at 3/7/2019 10:41 PM CST -----  Reviewed, nothing to do; repeat per routine

## 2019-03-10 ENCOUNTER — PATIENT MESSAGE (OUTPATIENT)
Dept: GASTROENTEROLOGY | Facility: CLINIC | Age: 64
End: 2019-03-10

## 2019-03-18 LAB
EXT ALBUMIN: 3.5
EXT ALKALINE PHOSPHATASE: 88
EXT ALT: 8
EXT AST: 7
EXT BILIRUBIN TOTAL: 0.4
EXT BUN: 32
EXT CALCIUM: 8.4
EXT CHLORIDE: 109
EXT CO2: 26
EXT CREATININE: 1.4 MG/DL
EXT EOSINOPHIL%: 2.7
EXT GFR MDRD NON AF AMER: 51
EXT GLUCOSE: 92
EXT HEMATOCRIT: 31.4
EXT HEMOGLOBIN: 10.9
EXT LYMPH%: 11.5
EXT MONOCYTES%: 10.1
EXT PLATELETS: 131
EXT POTASSIUM: 4.8
EXT PROTEIN TOTAL: 6.5
EXT SEGS%: 74.9
EXT SODIUM: 140 MMOL/L
EXT TACROLIMUS LVL: 7.9
EXT WBC: 4.7

## 2019-03-20 ENCOUNTER — TELEPHONE (OUTPATIENT)
Dept: TRANSPLANT | Facility: CLINIC | Age: 64
End: 2019-03-20

## 2019-03-20 NOTE — TELEPHONE ENCOUNTER
----- Message from Miriam García MD sent at 3/20/2019 12:58 PM CDT -----  Reviewed, nothing to do; repeat per routine

## 2019-03-28 ENCOUNTER — TELEPHONE (OUTPATIENT)
Dept: RADIOLOGY | Facility: HOSPITAL | Age: 64
End: 2019-03-28

## 2019-03-29 ENCOUNTER — OFFICE VISIT (OUTPATIENT)
Dept: GASTROENTEROLOGY | Facility: CLINIC | Age: 64
End: 2019-03-29
Payer: MEDICARE

## 2019-03-29 ENCOUNTER — HOSPITAL ENCOUNTER (OUTPATIENT)
Dept: RADIOLOGY | Facility: HOSPITAL | Age: 64
Discharge: HOME | End: 2019-03-29
Attending: INTERNAL MEDICINE
Payer: MEDICARE

## 2019-03-29 ENCOUNTER — TELEPHONE (OUTPATIENT)
Dept: TRANSPLANT | Facility: CLINIC | Age: 64
End: 2019-03-29

## 2019-03-29 VITALS
SYSTOLIC BLOOD PRESSURE: 128 MMHG | WEIGHT: 234.56 LBS | BODY MASS INDEX: 30.1 KG/M2 | HEIGHT: 74 IN | HEART RATE: 74 BPM | DIASTOLIC BLOOD PRESSURE: 60 MMHG

## 2019-03-29 DIAGNOSIS — Z94.4 LIVER TRANSPLANTED: ICD-10-CM

## 2019-03-29 DIAGNOSIS — D84.9 IMMUNOSUPPRESSION: Primary | ICD-10-CM

## 2019-03-29 DIAGNOSIS — Z94.4 LIVER REPLACED BY TRANSPLANT: ICD-10-CM

## 2019-03-29 PROCEDURE — 76705 ECHO EXAM OF ABDOMEN: CPT | Mod: 26,59,, | Performed by: RADIOLOGY

## 2019-03-29 PROCEDURE — 93975 VASCULAR STUDY: CPT | Mod: TC

## 2019-03-29 PROCEDURE — 93975 VASCULAR STUDY: CPT | Mod: 26,,, | Performed by: RADIOLOGY

## 2019-03-29 PROCEDURE — 99999 PR PBB SHADOW E&M-EST. PATIENT-LVL III: ICD-10-PCS | Mod: PBBFAC,,, | Performed by: INTERNAL MEDICINE

## 2019-03-29 PROCEDURE — 99213 OFFICE O/P EST LOW 20 MIN: CPT | Mod: S$PBB,,, | Performed by: INTERNAL MEDICINE

## 2019-03-29 PROCEDURE — 99213 OFFICE O/P EST LOW 20 MIN: CPT | Mod: PBBFAC,25,PN | Performed by: INTERNAL MEDICINE

## 2019-03-29 PROCEDURE — 93975 US LIVER TRANSPLANT POST: ICD-10-PCS | Mod: 26,,, | Performed by: RADIOLOGY

## 2019-03-29 PROCEDURE — 99999 PR PBB SHADOW E&M-EST. PATIENT-LVL III: CPT | Mod: PBBFAC,,, | Performed by: INTERNAL MEDICINE

## 2019-03-29 PROCEDURE — 76705 US LIVER TRANSPLANT POST: ICD-10-PCS | Mod: 26,59,, | Performed by: RADIOLOGY

## 2019-03-29 PROCEDURE — 99213 PR OFFICE/OUTPT VISIT, EST, LEVL III, 20-29 MIN: ICD-10-PCS | Mod: S$PBB,,, | Performed by: INTERNAL MEDICINE

## 2019-03-29 NOTE — PROGRESS NOTES
Transplant Hepatology  Liver Transplant Recipient Follow-up    Transplant Date: 2018  UNOS Native Liver Dx: Alcoholic Cirrhosis    Alon is here for follow up of his liver transplant.    ORGAN: LIVER  Whole or Partial: whole liver  Donor Type:  - brain death  CDC High Risk: no  Donor CMV Status: Negative  Donor HCV Status: Negative  Donor HBcAb: Negative  Biliary Anastomosis: end to end  Arterial Anatomy: standard  IVC reconstruction: end to end ivc  Portal vein status: partial patent  .    Subjective:     Interval History:  Currently, he is doing adequately. Current complaints include chronic issues related to his right shoulder.  Otherwise his weight has been increasing.  His energy level has been improving.  He has no significant issues related to his liver since last visit.    Review of Systems    Objective:     Physical Exam   Constitutional: He is oriented to person, place, and time. He appears well-developed and well-nourished. No distress.   HENT:   Head: Normocephalic and atraumatic.   Mouth/Throat: No oropharyngeal exudate.   Poor denitition   Eyes: Pupils are equal, round, and reactive to light. Conjunctivae are normal. Right eye exhibits no discharge. Left eye exhibits no discharge. No scleral icterus.   Pulmonary/Chest: Effort normal and breath sounds normal. No respiratory distress. He has no wheezes.   Abdominal: Soft. He exhibits no distension. There is no tenderness.   Musculoskeletal: He exhibits no edema.   Neurological: He is alert and oriented to person, place, and time.   Psychiatric: He has a normal mood and affect. His behavior is normal.   Vitals reviewed.      WBC   Date Value Ref Range Status   2018 5.39 3.90 - 12.70 K/uL Final     Hemoglobin   Date Value Ref Range Status   2018 9.8 (L) 14.0 - 18.0 g/dL Final     POC Hematocrit   Date Value Ref Range Status   2018 25 (L) 36 - 54 %PCV Final     Hematocrit   Date Value Ref Range Status   2018 30.3 (L)  40.0 - 54.0 % Final     Platelets   Date Value Ref Range Status   09/27/2018 114 (L) 150 - 350 K/uL Final     BUN, Bld   Date Value Ref Range Status   09/27/2018 77 (H) 8 - 23 mg/dL Final     Creatinine   Date Value Ref Range Status   09/27/2018 2.4 (H) 0.5 - 1.4 mg/dL Final     Glucose   Date Value Ref Range Status   09/27/2018 138 (H) 70 - 110 mg/dL Final     Calcium   Date Value Ref Range Status   09/27/2018 10.3 8.7 - 10.5 mg/dL Final     Sodium   Date Value Ref Range Status   09/27/2018 138 136 - 145 mmol/L Final     Potassium   Date Value Ref Range Status   09/27/2018 5.1 3.5 - 5.1 mmol/L Final     Chloride   Date Value Ref Range Status   09/27/2018 104 95 - 110 mmol/L Final     Magnesium   Date Value Ref Range Status   09/10/2018 2.1 1.6 - 2.6 mg/dL Final     AST   Date Value Ref Range Status   09/27/2018 11 10 - 40 U/L Final     ALT   Date Value Ref Range Status   09/27/2018 9 (L) 10 - 44 U/L Final     Alkaline Phosphatase   Date Value Ref Range Status   09/27/2018 69 55 - 135 U/L Final     Total Bilirubin   Date Value Ref Range Status   09/27/2018 0.6 0.1 - 1.0 mg/dL Final     Comment:     For infants and newborns, interpretation of results should be based  on gestational age, weight and in agreement with clinical  observations.  Premature Infant recommended reference ranges:  Up to 24 hours.............<8.0 mg/dL  Up to 48 hours............<12.0 mg/dL  3-5 days..................<15.0 mg/dL  6-29 days.................<15.0 mg/dL       Albumin   Date Value Ref Range Status   09/27/2018 4.0 3.5 - 5.2 g/dL Final     INR   Date Value Ref Range Status   07/31/2018 1.2 0.8 - 1.2 Final     Comment:     Coumadin Therapy:  2.0 - 3.0 for INR for all indicators except mechanical heart valves  and antiphospholipid syndromes which should use 2.5 - 3.5.       Lab Results   Component Value Date    TACROLIMUS 5.1 09/27/2018           Assessment/Plan:     1. Immunosuppression    2. Liver transplanted         Immunosuppression  -discontinue CellCept  -patient already reports having labs in about a week which should be okay    Liver transplanted-good allograft function, patient has been doing much better after transplant improving nicely  -explained that he is over 6 months after transplant and can proceed with any surgery that he needs especially as relates to his shoulder  -he had questions about his Januvia.  I advised that he talk with his primary care doctor.  Also explained that will be shifting everything non liver back to the primary care doctors.  Advised that he can proceed as a recommend.    Return to clinic in about 4 months at a time for 1 year post transplant follow-up    Patient was seen in the liver transplant department at [facility].      Miriam García MD         [facility] Patient Status  Functional Status: 70% - Cares for self: unable to carry on normal activity or active work  Physical Capacity: No Limitations

## 2019-03-29 NOTE — TELEPHONE ENCOUNTER
----- Message from Miriam García MD sent at 3/29/2019 11:01 AM CDT -----  Reviewed, nothing to do; repeat per routine

## 2019-04-08 LAB
EXT ALBUMIN: 3.7
EXT ALKALINE PHOSPHATASE: 87
EXT ALT: 9
EXT AST: 11
EXT BILIRUBIN TOTAL: 0.5
EXT BUN: 34
EXT CALCIUM: 8.9
EXT CHLORIDE: 108
EXT CO2: 27
EXT CREATININE: 1.6 MG/DL
EXT GFR MDRD NON AF AMER: 44
EXT GLUCOSE: 109
EXT POTASSIUM: 5.9
EXT PROTEIN TOTAL: 6.8
EXT SODIUM: 140 MMOL/L
EXT TACROLIMUS LVL: 6.2

## 2019-04-08 NOTE — TELEPHONE ENCOUNTER
"Spoke to patient re: K+ level elevated; he reported he is taking the Valtessa "every day",  But stated that he has been eating " a lot of oranges lately , every day, he said.  Rx, for Sodium Polystyrine refill sent to MD for review and signature.   "

## 2019-04-11 ENCOUNTER — TELEPHONE (OUTPATIENT)
Dept: TRANSPLANT | Facility: CLINIC | Age: 64
End: 2019-04-11

## 2019-04-11 NOTE — TELEPHONE ENCOUNTER
Patient reported he took Kayexelate as ordered. Instructed patient to have repeat labs tomorrow. He was able to repeat instructions and voiced understanding.  Lab Order faxed to his local lab.

## 2019-04-11 NOTE — TELEPHONE ENCOUNTER
----- Message from Miriam García MD sent at 4/9/2019  3:55 PM CDT -----  Cr and potassium elevated needs kayexalate and repeat labs this week awaiting tacro level which may need to be decreased

## 2019-04-12 ENCOUNTER — TELEPHONE (OUTPATIENT)
Dept: PHARMACY | Facility: CLINIC | Age: 64
End: 2019-04-12

## 2019-04-12 LAB
EXT ALBUMIN: 3.5
EXT ALKALINE PHOSPHATASE: 85
EXT ALT: 7
EXT AST: 9
EXT BILIRUBIN TOTAL: 0.5
EXT BUN: 31
EXT CALCIUM: 9
EXT CHLORIDE: 109
EXT CO2: 27
EXT CREATININE: 1.4 MG/DL
EXT EOSINOPHIL%: 3.1
EXT GFR MDRD NON AF AMER: 51
EXT GLUCOSE: 101
EXT HEMATOCRIT: 32.4
EXT HEMOGLOBIN: 11
EXT LYMPH%: 11.8
EXT MONOCYTES%: 11.1
EXT PLATELETS: 112
EXT POTASSIUM: 5.1
EXT PROTEIN TOTAL: 6.7
EXT SEGS%: 73.2
EXT SODIUM: 141 MMOL/L
EXT TACROLIMUS LVL: 5.4
EXT WBC: 4.3

## 2019-04-18 ENCOUNTER — TELEPHONE (OUTPATIENT)
Dept: TRANSPLANT | Facility: CLINIC | Age: 64
End: 2019-04-18

## 2019-04-18 NOTE — TELEPHONE ENCOUNTER
----- Message from Miriam García MD sent at 4/18/2019  1:49 PM CDT -----  Reviewed, nothing to do; repeat per routine

## 2019-04-26 RX ORDER — ATORVASTATIN CALCIUM 40 MG/1
40 TABLET, FILM COATED ORAL DAILY
Qty: 30 TABLET | Refills: 5 | OUTPATIENT
Start: 2019-04-26

## 2019-05-06 LAB
EXT ALBUMIN: 3.3
EXT ALKALINE PHOSPHATASE: 143
EXT ALT: 31
EXT AST: 10
EXT BILIRUBIN TOTAL: 0.4
EXT BUN: 34
EXT CALCIUM: 9
EXT CHLORIDE: 108
EXT CO2: 25
EXT CREATININE: 1.4 MG/DL
EXT EOSINOPHIL%: 3.5
EXT GFR MDRD NON AF AMER: 51
EXT GLUCOSE: 109
EXT HEMATOCRIT: 31.1
EXT HEMOGLOBIN: 10.6
EXT LYMPH%: 12
EXT MONOCYTES%: 11
EXT PLATELETS: 124
EXT POTASSIUM: 4.8
EXT PROTEIN TOTAL: 7
EXT SEGS%: 72.6
EXT SODIUM: 140 MMOL/L
EXT TACROLIMUS LVL: 7.2
EXT WBC: 4.4

## 2019-05-17 ENCOUNTER — TELEPHONE (OUTPATIENT)
Dept: PHARMACY | Facility: CLINIC | Age: 64
End: 2019-05-17

## 2019-05-17 NOTE — TELEPHONE ENCOUNTER
Veltassa Refill and follow-up:  Confirmed 2 patient identifiers - name and .   Patient has 10 doses of Veltassa remaining and takes it daily.  Pt reports they are not having any side effects so far. No missed doses, no new medications, no new allergies or health conditions reported at this time. All questions answered and addressed to patients satisfaction. Pt verbalized understanding. Will call Mr Chi on  to check if Ahsan has opened, if not, he will pay the co-pay and [facility] will ship medication.

## 2019-05-20 ENCOUNTER — TELEPHONE (OUTPATIENT)
Dept: TRANSPLANT | Facility: CLINIC | Age: 64
End: 2019-05-20

## 2019-05-20 ENCOUNTER — TELEPHONE (OUTPATIENT)
Dept: PHARMACY | Facility: CLINIC | Age: 64
End: 2019-05-20

## 2019-05-20 NOTE — TELEPHONE ENCOUNTER
----- Message from Miriam García MD sent at 5/17/2019  1:09 PM CDT -----  Reviewed, nothing to do; repeat per routine

## 2019-05-20 NOTE — TELEPHONE ENCOUNTER
Veltassa refill confirmed.   We will ship Veltassa refill on  via fedex to arrive on . $95.44 copay- 004. Confirmed 2 patient identifiers - name and .     Patient has 7 doses of Veltassa remaining and takes it around lunch daily.  Patient reports they are not having any side effects so far. No missed doses, no new medications, no new allergies or health conditions reported at this time. All questions answered and addressed to patients satisfaction. Pt verbalized understanding.

## 2019-05-21 NOTE — OP NOTE
ASSESSMENT/PLAN:  1. Moderate obstructive sleep apnea   Continue current regimen.    2. Anxiety -  Discussed in overview.  Will restart low dose of escitalopram with titration in a week ; review response in 2-3 weeks    3. Insomnia, unspecified type  See #2   Zolpidem for PRN HS use.    4. Atherosclerosis of native coronary artery of native heart without angina pectoris  Continue current regimen.     5. Hypogonadism male   Continue current regimen. With injection due today   6. Essential hypertension, benign   Continue current regimen.        See Patient Instructions.   Orders Placed This Encounter   • zolpidem (AMBIEN) 10 MG tablet   • escitalopram (LEXAPRO) 10 MG tablet     Phone review in 3-4 weeks, earlier PRN.   Return in about 6 months (around 11/21/2019).      ___________________________________________________  SUBJECTIVE:  Jaren is a 72 year old male who is seen unaccompanied for several problems.    Problems touched on today    Diagnosis Date Noted   • Moderate obstructive sleep apnea 01/19/2018     Moderate obstructive sleep apnea with oxygen desaturation  Current CPAP settings:  On autotitration 5-20   Compliant with use?:  [x]  YES    []  NO   []   OTHER  Daytime hypersomnolence:  []  YES    [x]  NO   Weight stable:   [x]  YES    []  NO    • Anxiety 06/27/2014     Prior use of: fluoxetine, paroxetine, escitalopram, citalopram, venlafaxine, buproprion, amitriptyline.  He reports substantial increase in stress with his son suffering a recent stroke at young age.  He is recovering but there were at least 2 intense periods of stress while waiting to here more information.   He questions trying medication again, specifically consdering trying escitalopram again.      • Insomnia 03/21/2014      see above.      • Coronary atherosclerosis of native coronary artery 06/28/2013     Cardiac catheterization at [facility] 6/12/13 showed 40% narrowing of the diagonal branch of the LAD, 20% narrowing of the RCA with  [facility] Lab Post Procedure Note    Patient tolerated the procedure well. There were no complications. Please see full report in [facility] for details.     ejection fraction of 60%. Medical management recommended.  On simvastatin.  He  is doing well with current regimen.  No chest pain, palpitations, dyspnea on exertion, pedal edema or orthopnea.  Last stress test:     10/20/2016  He was last seen by Dr Carlson 10/2018.     Statin use:   [x]  YES    []  NO   []   OTHER  Anti-platelet therapy:    [x]  YES    []  NO   []   OTHER     • Esophageal reflux 01/16/2013     On antireflux measures and when necessary omeprazole. Treated more aggressively along with trial of pantoprazole after pulmonary consult with Dr Figueroa for ALVAREZ.   Symptoms improved, continuing with anti-reflux measures and PRN PPI.     • Hypogonadism male 01/03/2013     On testosterone supplementation intramuscularly.  Additional cardiovascular risks reviewed - he had decided to stop but had recurrence of ED and loss of libido.  On review with spouse present 6/27/2014 he decided to resume IM testosterone.  He continues on testosterone supplementation for hypogonadism.  No change in urination, chest pain, shortness of breath, or focal neurologic complaints.  Updated (2014) risk-benefit discussion done?: [x]  YES  (see overview)  []  NO     Last PSA -  PSA, Total (ng/mL)   Date Value   08/22/2018 1.08         • Essential hypertension, benign 01/03/2013     On atenolol, accupril  Blood pressure readings have been good.  No side effects with current meds  Lab Results   Component Value Date    SODIUM 138 05/11/2018    POTASSIUM 4.5 05/11/2018    CHLORIDE 102 05/11/2018    CO2 28 05/11/2018    BUN 15 05/11/2018    CREATININE 0.90 05/11/2018    GLUCOSE 87 05/11/2018          • Benign localized hyperplasia of prostate without urinary obstruction and other lower urinary tract symptoms (LUTS) 01/03/2013     On symptomatic treatment with tamsulosin.  No side effects.  Nocturia:  1 per night   Urgency? []  YES    [x]  NO  Dysuria? []  YES    [x]  NO  PSA, Total (ng/mL)   Date Value   08/22/2018 1.08       • Colon polyp       Colonoscopy/polypectomy 2013 -> had follow up 12/2018 with Dr Albarran with no pathology.  No further surveillance in the absence of symptoms.        REVIEW OF SYSTEMS::  In addition to that noted above, a review of systems is undertaken with remarkable reporting of:  Constitutional:   Generally feeling well.  Weight stable. Energy levels good.   Eyes:   Corrective lenses.     Chart Review:  I have reviewed the patient's MEDICATIONS and ALLERGIES, PAST MEDICAL, SURGICAL, SOCIAL AND FAMILY HISTORIES updating these as appropriate.    See Histories section of the EMR for a display of this information.       OBJECTIVE:  Visit Vitals  /76 (BP Location: [facility] [place], Patient Position: Sitting, Cuff Size: Large Adult)   Pulse 58   Temp 97.8 °F (36.6 °C) (Tympanic)   Ht 6' (1.829 m)   Wt 93.7 kg   SpO2 96%   BMI 28.01 kg/m²     General:  Well developed male.  Overweight.  In no acute distress.    HEENT:  Normocephalic, atraumatic. Eyes- EOMI (Extraocular movements intact), PERRLA (pupils equal, round, reactive to light and accommodation), no conjunctival pallor or scleral icterus.   Hearing grossly intact.   Neck: Supple without masses or thyromegaly.  Trachea midline.  Lymphatics:  No cervical, axillary adenopathy.  Chest: Symmetrical configuration, nontender.  Breath sounds clear throughout.  No accessory muscle use.  Cardiovascular:  Regular rate and rhythm, no murmur or gallop.   Peripheral pulses intact and symmetrical in upper and lower extremities.  No carotid or abdominal bruits appreciated.  Abdomen: Soft, nontender, bowel sounds present.  No appreciable hepatomegaly or splenomegaly.  No masses.   Extremities: Without deformity, clubbing, or cyanosis of upper or lower extremities.  No edema of lower extremities.  Neurologic:  Alert, verbal, appropriate.  Oriented times 3.  Speech fluent.  Cranial nerves II-XII intact to observation.  No apraxia or ataxia observed. .

## 2019-05-22 ENCOUNTER — TELEPHONE (OUTPATIENT)
Dept: PHARMACY | Facility: CLINIC | Age: 64
End: 2019-05-22

## 2019-06-05 LAB
EXT ALBUMIN: 3.3
EXT ALKALINE PHOSPHATASE: 156
EXT ALT: 33
EXT AST: 12
EXT BILIRUBIN TOTAL: 0.4
EXT BUN: 29
EXT CALCIUM: 9
EXT CHLORIDE: 107
EXT CO2: 25
EXT CREATININE: 1.4 MG/DL
EXT EOSINOPHIL%: 2.1
EXT GFR MDRD NON AF AMER: 51
EXT GLUCOSE: 109
EXT HEMATOCRIT: 31.5
EXT HEMOGLOBIN: 10.7
EXT LYMPH%: 12
EXT MONOCYTES%: 10.1
EXT PLATELETS: 133
EXT POTASSIUM: 5.2
EXT PROTEIN TOTAL: 6.9
EXT SEGS%: 75
EXT SODIUM: 139 MMOL/L
EXT TACROLIMUS LVL: 9.1
EXT WBC: 4.3

## 2019-06-06 ENCOUNTER — TELEPHONE (OUTPATIENT)
Dept: TRANSPLANT | Facility: CLINIC | Age: 64
End: 2019-06-06

## 2019-06-06 NOTE — TELEPHONE ENCOUNTER
----- Message from Miriam García MD sent at 6/6/2019  1:54 PM CDT -----  Reviewed, nothing to do; repeat per routine

## 2019-06-14 ENCOUNTER — TELEPHONE (OUTPATIENT)
Dept: TRANSPLANT | Facility: CLINIC | Age: 64
End: 2019-06-14

## 2019-06-14 NOTE — TELEPHONE ENCOUNTER
"Returned patient call; patient reported he is not having any stomach problems and would like to reduce the Protonix to once daily; also said he is taking Januvia "but my numbers have been good, so do I still need to take it?".  Patient informed that I will check with  about reducing the Protonix, but he should contact his PCP or endocrinologist re: the Januvia. He said he will do that.     "

## 2019-06-14 NOTE — TELEPHONE ENCOUNTER
----- Message from Kellie Gutierrez sent at 6/14/2019 12:53 PM CDT -----  Calling to find out if pantoprazole (PROTONIX) 40 MG tablet dosage can be decreased and if he can discontinue SITagliptin (JANUVIA) 25 MG Tab    pls contact to advise    Pt contact [phone]

## 2019-06-17 RX ORDER — PANTOPRAZOLE SODIUM 40 MG/1
40 TABLET, DELAYED RELEASE ORAL DAILY
Qty: 30 TABLET | Refills: 11
Start: 2019-06-17 | End: 2020-07-29

## 2019-06-17 NOTE — TELEPHONE ENCOUNTER
----- Message from Miriam García MD sent at 6/16/2019  3:25 PM CDT -----  Yesterday it can.   ----- Message -----  From: Helen Calzada  Sent: 6/14/2019   2:16 PM  To: Miriam García MD    Patient asked if Protonix dose can be reduced? Thanks.

## 2019-06-17 NOTE — TELEPHONE ENCOUNTER
Patient notified and instructed : Protonix can be reduced to 1/per day from 2/day. He was able to voice understanding

## 2019-06-27 NOTE — TELEPHONE ENCOUNTER
FOR DOCUMENTATION ONLY:    Financial Assistance for Harsha approved from 05/22/2019 to 01/16/2020    Source: [employer] Ahsan     ID: [id number]  BIN: [id number]  PCN: [id number]  GRP: [id number]    Amount: $1,434

## 2019-07-01 ENCOUNTER — TELEPHONE (OUTPATIENT)
Dept: GASTROENTEROLOGY | Facility: CLINIC | Age: 64
End: 2019-07-01

## 2019-07-01 LAB
EXT ALBUMIN: 3.5
EXT ALKALINE PHOSPHATASE: 100
EXT ALT: 11
EXT AST: 8
EXT BILIRUBIN TOTAL: 0.4
EXT BUN: 33
EXT CALCIUM: 9.5
EXT CHLORIDE: 105
EXT CO2: 28
EXT CREATININE: 1.5 MG/DL
EXT EOSINOPHIL%: 2.6
EXT GFR MDRD NON AF AMER: 47
EXT GLUCOSE: 112
EXT HEMATOCRIT: 32.1
EXT HEMOGLOBIN: 10.8
EXT LYMPH%: 10.3
EXT MONOCYTES%: 9.9
EXT PLATELETS: 150
EXT POTASSIUM: 5
EXT PROTEIN TOTAL: 6.9
EXT SEGS%: 76.4
EXT SODIUM: 140 MMOL/L
EXT TACROLIMUS LVL: 8.6
EXT WBC: 5.2

## 2019-07-01 NOTE — TELEPHONE ENCOUNTER
----- Message from Niesha Escalona sent at 7/1/2019 12:16 PM CDT -----  Contact: pt   A liver transplant pt is calling to reschedule appt. Pt Son starts school on that day. Pt is requesting another appt sooner that the schedule allows. Pt is coming for for 1 year follow up    Pt call back [phone]     Thanks

## 2019-07-01 NOTE — TELEPHONE ENCOUNTER
Spoke with patient will see if we can switch appointment with another patient since he has to register his child for school, FUNMILAYO.

## 2019-07-08 ENCOUNTER — TELEPHONE (OUTPATIENT)
Dept: TRANSPLANT | Facility: CLINIC | Age: 64
End: 2019-07-08

## 2019-07-08 NOTE — TELEPHONE ENCOUNTER
----- Message from Miriam García MD sent at 7/7/2019  5:17 PM CDT -----  Reviewed, nothing to do; repeat per routine

## 2019-07-16 ENCOUNTER — TELEPHONE (OUTPATIENT)
Dept: PHARMACY | Facility: CLINIC | Age: 64
End: 2019-07-16

## 2019-07-31 ENCOUNTER — OFFICE VISIT (OUTPATIENT)
Dept: GASTROENTEROLOGY | Facility: CLINIC | Age: 64
End: 2019-07-31
Payer: MEDICARE

## 2019-07-31 VITALS
SYSTOLIC BLOOD PRESSURE: 132 MMHG | DIASTOLIC BLOOD PRESSURE: 78 MMHG | BODY MASS INDEX: 31.12 KG/M2 | WEIGHT: 242.5 LBS | HEIGHT: 74 IN

## 2019-07-31 DIAGNOSIS — Z94.4 LIVER TRANSPLANTED: ICD-10-CM

## 2019-07-31 DIAGNOSIS — D84.9 IMMUNOSUPPRESSION: ICD-10-CM

## 2019-07-31 DIAGNOSIS — K08.9 POOR DENTITION: Primary | ICD-10-CM

## 2019-07-31 PROCEDURE — 99213 OFFICE O/P EST LOW 20 MIN: CPT | Mod: PBBFAC | Performed by: INTERNAL MEDICINE

## 2019-07-31 PROCEDURE — 99999 PR PBB SHADOW E&M-EST. PATIENT-LVL III: ICD-10-PCS | Mod: PBBFAC,,, | Performed by: INTERNAL MEDICINE

## 2019-07-31 PROCEDURE — 99999 PR PBB SHADOW E&M-EST. PATIENT-LVL III: CPT | Mod: PBBFAC,,, | Performed by: INTERNAL MEDICINE

## 2019-07-31 PROCEDURE — 99213 OFFICE O/P EST LOW 20 MIN: CPT | Mod: S$PBB,,, | Performed by: INTERNAL MEDICINE

## 2019-07-31 PROCEDURE — 99213 PR OFFICE/OUTPT VISIT, EST, LEVL III, 20-29 MIN: ICD-10-PCS | Mod: S$PBB,,, | Performed by: INTERNAL MEDICINE

## 2019-07-31 NOTE — PROGRESS NOTES
Transplant Hepatology  Liver Transplant Recipient Follow-up    Transplant Date: 2018  UNOS Native Liver Dx: Alcoholic Cirrhosis    Alon is here for follow up of his liver transplant.    ORGAN: LIVER  Whole or Partial: whole liver  Donor Type:  - brain death  CDC High Risk: no  Donor CMV Status: Negative  Donor HCV Status: Negative  Donor HBcAb: Negative  Biliary Anastomosis: end to end  Arterial Anatomy: standard  IVC reconstruction: end to end ivc  Portal vein status: partial patent  .    Subjective:     Interval History:  Currently, he is doing adequately. Current complaints include chronic issues with his right shoulder and elbow as well as his left hand.  He has continue to follow with orthopedics.  He is not side whether 90 going to proceed with surgery.  Otherwise he has continued to gain weight although he is trying to eat healthy to keep his weight controlled.  He denies any other major issues.  He has continued to get better after transplant.    Review of Systems    Objective:     Physical Exam   Constitutional: He is oriented to person, place, and time. He appears well-developed and well-nourished. No distress.   HENT:   Head: Normocephalic and atraumatic.   Mouth/Throat: Oropharynx is clear and moist. No oropharyngeal exudate.   Poor dentition   Eyes: Pupils are equal, round, and reactive to light. Conjunctivae are normal. Right eye exhibits no discharge. Left eye exhibits no discharge. No scleral icterus.   Pulmonary/Chest: Effort normal and breath sounds normal. No respiratory distress. He has no wheezes.   Abdominal: Soft. He exhibits no distension. There is no tenderness.   Musculoskeletal: He exhibits no edema.   Neurological: He is alert and oriented to person, place, and time.   Psychiatric: He has a normal mood and affect. His behavior is normal.   Vitals reviewed.      WBC   Date Value Ref Range Status   2018 5.39 3.90 - 12.70 K/uL Final     Hemoglobin   Date Value Ref Range  Status   09/27/2018 9.8 (L) 14.0 - 18.0 g/dL Final     POC Hematocrit   Date Value Ref Range Status   07/23/2018 25 (L) 36 - 54 %PCV Final     Hematocrit   Date Value Ref Range Status   09/27/2018 30.3 (L) 40.0 - 54.0 % Final     Platelets   Date Value Ref Range Status   09/27/2018 114 (L) 150 - 350 K/uL Final     BUN, Bld   Date Value Ref Range Status   09/27/2018 77 (H) 8 - 23 mg/dL Final     Creatinine   Date Value Ref Range Status   09/27/2018 2.4 (H) 0.5 - 1.4 mg/dL Final     Glucose   Date Value Ref Range Status   09/27/2018 138 (H) 70 - 110 mg/dL Final     Calcium   Date Value Ref Range Status   09/27/2018 10.3 8.7 - 10.5 mg/dL Final     Sodium   Date Value Ref Range Status   09/27/2018 138 136 - 145 mmol/L Final     Potassium   Date Value Ref Range Status   09/27/2018 5.1 3.5 - 5.1 mmol/L Final     Chloride   Date Value Ref Range Status   09/27/2018 104 95 - 110 mmol/L Final     Magnesium   Date Value Ref Range Status   09/10/2018 2.1 1.6 - 2.6 mg/dL Final     AST   Date Value Ref Range Status   09/27/2018 11 10 - 40 U/L Final     ALT   Date Value Ref Range Status   09/27/2018 9 (L) 10 - 44 U/L Final     Alkaline Phosphatase   Date Value Ref Range Status   09/27/2018 69 55 - 135 U/L Final     Total Bilirubin   Date Value Ref Range Status   09/27/2018 0.6 0.1 - 1.0 mg/dL Final     Comment:     For infants and newborns, interpretation of results should be based  on gestational age, weight and in agreement with clinical  observations.  Premature Infant recommended reference ranges:  Up to 24 hours.............<8.0 mg/dL  Up to 48 hours............<12.0 mg/dL  3-5 days..................<15.0 mg/dL  6-29 days.................<15.0 mg/dL       Albumin   Date Value Ref Range Status   09/27/2018 4.0 3.5 - 5.2 g/dL Final     INR   Date Value Ref Range Status   07/31/2018 1.2 0.8 - 1.2 Final     Comment:     Coumadin Therapy:  2.0 - 3.0 for INR for all indicators except mechanical heart valves  and antiphospholipid  syndromes which should use 2.5 - 3.5.       Lab Results   Component Value Date    TACROLIMUS 5.1 09/27/2018           Assessment/Plan:     1. Poor dentition    2. Immunosuppression    3. Liver transplanted      Poor dentition  -advised she see a dentist or or surgeon about getting his teeth removed as this is the source of infection    Immunosuppression  -continue current immunosuppression    Liver transplanted-good allograft function, patient is doing well now after transplant.  -advised patient about PCP follow-up, yearly dermatologic examination    Return to clinic in 6 months    Patient was seen in the liver transplant department at [facility].    Miriam García MD           [facility] Patient Status  Functional Status: 60% - Requires occasional assistance but is able to care for needs  Physical Capacity: No Limitations

## 2019-08-05 LAB
EXT ALBUMIN: 3.3
EXT ALKALINE PHOSPHATASE: 95
EXT ALT: 13
EXT AST: 8
EXT BILIRUBIN TOTAL: 0.3
EXT BUN: 38
EXT CALCIUM: 9.3
EXT CHLORIDE: 108
EXT CO2: 28
EXT CREATININE: 1.4 MG/DL
EXT EOSINOPHIL%: 3.5
EXT GFR MDRD NON AF AMER: 51
EXT GLUCOSE: 135
EXT HEMATOCRIT: 29.3
EXT HEMOGLOBIN: 10
EXT LYMPH%: 12
EXT MONOCYTES%: 11.2
EXT PLATELETS: 132
EXT POTASSIUM: 5.7
EXT PROTEIN TOTAL: 7.1
EXT SEGS%: 72.3
EXT SODIUM: 141 MMOL/L
EXT TACROLIMUS LVL: 6.9
EXT WBC: 4

## 2019-08-07 NOTE — TELEPHONE ENCOUNTER
"Patient notified and instructed: Kayexelate Rx. Sent to [facility] , he will need to take it due to elevated potassium level. He reported he has been eating more items recently with high potassium , such as potato Chips.  And "not drinking enough water.", he said.  He asked if Rx. Can be re-sent to [facility].     Informed him also: if potassium level remains elevated will plan to increase Valtessa dose. He was able to voice understanding.    "

## 2019-08-08 NOTE — TELEPHONE ENCOUNTER
Patient notified and instructed on increased dose of Valtessa, he was able to repeat instructions and voiced understanding.  Repeat labs on Monday, patient voiced understanding.

## 2019-08-08 NOTE — TELEPHONE ENCOUNTER
----- Message from Miriam García MD sent at 8/7/2019  6:22 PM CDT -----  Continues with potassium issues.  Need to make sure patient is not taking any spent to ask him supplements or eating foods high in potassium.  Recommend increasing veltassa to double dose.  Needs Kayexalate and repeat labs.

## 2019-08-12 LAB
EXT ALBUMIN: 3.5
EXT ALKALINE PHOSPHATASE: 98
EXT ALT: 12
EXT AST: 8
EXT BILIRUBIN TOTAL: 0.5
EXT BUN: 44
EXT CALCIUM: 8.6
EXT CHLORIDE: 106
EXT CO2: 28
EXT CREATININE: 1.6 MG/DL
EXT GFR MDRD NON AF AMER: 44
EXT GLUCOSE: 132
EXT HEMATOCRIT: 29.7
EXT HEMOGLOBIN: 10.3
EXT PLATELETS: 139
EXT POTASSIUM: 5.1
EXT PROTEIN TOTAL: 6.4
EXT SODIUM: 139 MMOL/L
EXT TACROLIMUS LVL: 7.9
EXT WBC: 5.1

## 2019-08-13 ENCOUNTER — TELEPHONE (OUTPATIENT)
Dept: PHARMACY | Facility: CLINIC | Age: 64
End: 2019-08-13

## 2019-08-13 NOTE — TELEPHONE ENCOUNTER
Veltassa refill confirmed. We will ship Veltassa refill on 8/15 via fedex to arrive on . $0.00 copay- 004. Confirmed 2 patient identifiers - name and .     Patient has 6 days of Veltassa remaining and takes it around lunchtime daily. He was instructed of dose increase from nurse coordinator - Veltassa 8.4g - 2 packets (16.8g total) daily.  Pt reports they are not having any side effects so far. He was eating a lot of potato chips last week and identifies that he needs to modify his diet to ensure potassium reduction. Overall, he is feeling well. No missed doses, no new medications, no new allergies or health conditions reported at this time. All questions answered and addressed to patients satisfaction. Pt verbalized understanding.

## 2019-08-19 ENCOUNTER — TELEPHONE (OUTPATIENT)
Dept: TRANSPLANT | Facility: CLINIC | Age: 64
End: 2019-08-19

## 2019-08-19 NOTE — TELEPHONE ENCOUNTER
Notified patient via portal that this is a follow up to your recent lab test.  Dr. García reviewed your lab and it is stable.  There are no medication changes at this time.  Please repeat labs 9/16/19  Thank you  Dianna Miller, MSN, RNBC    Liver Transplant Coordinator

## 2019-08-19 NOTE — TELEPHONE ENCOUNTER
----- Message from Miriam García MD sent at 8/19/2019 10:31 AM CDT -----  Reviewed, nothing to do; repeat per routine

## 2019-09-03 LAB
EXT ALBUMIN: 3.5
EXT ALKALINE PHOSPHATASE: 89
EXT ALT: 9
EXT AST: 9
EXT BILIRUBIN TOTAL: 0.5
EXT BUN: 35
EXT CALCIUM: 8.8
EXT CHLORIDE: 108
EXT CO2: 27
EXT CREATININE: 1.6 MG/DL
EXT GFR MDRD NON AF AMER: 44
EXT GLUCOSE: 108
EXT HEMATOCRIT: 29.6
EXT HEMOGLOBIN: 10.1
EXT PLATELETS: 141
EXT POTASSIUM: 4.9
EXT PROTEIN TOTAL: 6.5
EXT SODIUM: 139 MMOL/L
EXT TACROLIMUS LVL: 5.5
EXT WBC: 5.1

## 2019-09-11 ENCOUNTER — TELEPHONE (OUTPATIENT)
Dept: TRANSPLANT | Facility: CLINIC | Age: 64
End: 2019-09-11

## 2019-09-11 NOTE — TELEPHONE ENCOUNTER
----- Message from Miriam García MD sent at 9/10/2019  3:30 PM CDT -----  Reviewed, nothing to do; repeat per routine

## 2019-09-16 DIAGNOSIS — Z94.4 LIVER REPLACED BY TRANSPLANT: ICD-10-CM

## 2019-09-30 LAB
EXT ALBUMIN: 3.3
EXT ALKALINE PHOSPHATASE: 94
EXT ALT: 9
EXT AST: 8
EXT BILIRUBIN TOTAL: 0.4
EXT BUN: 34
EXT CALCIUM: 9.8
EXT CHLORIDE: 106
EXT CO2: 28
EXT CREATININE: 1.4 MG/DL
EXT GFR MDRD NON AF AMER: 51
EXT GLUCOSE: 126
EXT HEMATOCRIT: 30.1
EXT HEMOGLOBIN: 10.4
EXT PLATELETS: 133
EXT POTASSIUM: 4.7
EXT PROTEIN TOTAL: 7.2
EXT SODIUM: 140 MMOL/L
EXT TACROLIMUS LVL: 7.5
EXT WBC: 4.8

## 2019-10-03 ENCOUNTER — TELEPHONE (OUTPATIENT)
Dept: TRANSPLANT | Facility: CLINIC | Age: 64
End: 2019-10-03

## 2019-10-03 NOTE — TELEPHONE ENCOUNTER
----- Message from Miriam García MD sent at 10/3/2019  3:19 PM CDT -----  Reviewed, nothing to do; repeat per routine

## 2019-10-06 NOTE — SUBJECTIVE & OBJECTIVE
"Interval HPI:   Overnight events: remains in TSU. BG above goal overnight but below goal this AM. Prandial excursion noted. Prednisone 15 mg daily; standard steroid taper.   Eatin% -100% improving   Nausea: No  Hypoglycemia and intervention: No  Fever: No  TPN and/or TF: No    BP (!) 141/65 (BP Location: Left arm, Patient Position: Lying)   Pulse 60   Temp 98 °F (36.7 °C) (Oral)   Resp 16   Ht 6' 2" (1.88 m)   Wt 127.1 kg (280 lb 3.2 oz)   SpO2 97%   BMI 35.98 kg/m²     Labs Reviewed and Include      Recent Labs  Lab 18  0344   *   CALCIUM 8.0*   ALBUMIN 2.6*   PROT 4.1*   *   K 4.2   CO2 22*      BUN 36*   CREATININE 3.4*   ALKPHOS 61   ALT 23   AST 11   BILITOT 0.5     Lab Results   Component Value Date    WBC 4.75 2018    HGB 8.1 (L) 2018    HCT 24.2 (L) 2018    MCV 97 2018    PLT 60 (L) 2018     No results for input(s): TSH, FREET4 in the last 168 hours.  Lab Results   Component Value Date    HGBA1C 5.2 2018       Nutritional status:   Body mass index is 35.98 kg/m².  Lab Results   Component Value Date    ALBUMIN 2.6 (L) 2018    ALBUMIN 2.9 (L) 2018    ALBUMIN 2.5 (L) 2018     Lab Results   Component Value Date    PREALBUMIN 4 (L) 2018    PREALBUMIN 5 (L) 2018    PREALBUMIN 7 (L) 2016       Estimated Creatinine Clearance: 31.9 mL/min (A) (based on SCr of 3.4 mg/dL (H)).    Accu-Checks  Recent Labs      18   1933  18   0806  18   1320  18   1715  18   2136  18   0735  18   1144  18   1701  18   2107  18   0757   POCTGLUCOSE  133*  88  139*  147*  182*  148*  179*  219*  227*  88       Current Medications and/or Treatments Impacting Glycemic Control  Immunotherapy:  Immunosuppressants         Stop Route Frequency     tacrolimus capsule 4 mg      -- Oral 2 times daily     mycophenolate capsule 1,000 mg      -- Oral 2 times daily        Steroids: "   Hormones     Start     Stop Route Frequency Ordered    08/05/18 0900  predniSONE tablet 15 mg  (methylprednisolone taper panel)      -- Oral Daily 08/04/18 0850        Pressors:    Autonomic Drugs     None        Hyperglycemia/Diabetes Medications: Antihyperglycemics     Start     Stop Route Frequency Ordered    08/04/18 0900  insulin aspart U-100 pen 2 Units      -- SubQ 3 times daily with meals 08/04/18 0859    07/27/18 1805  insulin aspart U-100 pen 0-5 Units      -- SubQ Before meals & nightly PRN 07/27/18 1705         None known

## 2019-10-08 ENCOUNTER — TELEPHONE (OUTPATIENT)
Dept: PHARMACY | Facility: CLINIC | Age: 64
End: 2019-10-08

## 2019-11-04 LAB
EXT ALBUMIN: 3.4
EXT ALKALINE PHOSPHATASE: 94
EXT ALT: 8
EXT AST: 8
EXT BILIRUBIN TOTAL: 0.4
EXT BUN: 30
EXT CALCIUM: 9
EXT CHLORIDE: 107
EXT CO2: 26
EXT CREATININE: 1.3 MG/DL
EXT GFR MDRD NON AF AMER: 56
EXT GLUCOSE: 137
EXT HEMATOCRIT: 31.4
EXT HEMOGLOBIN: 10.4
EXT PLATELETS: 142
EXT POTASSIUM: 5.1
EXT PROTEIN TOTAL: 6.5
EXT SODIUM: 137 MMOL/L
EXT TACROLIMUS LVL: 5.5
EXT WBC: 6.8

## 2019-11-06 ENCOUNTER — TELEPHONE (OUTPATIENT)
Dept: TRANSPLANT | Facility: CLINIC | Age: 64
End: 2019-11-06

## 2019-11-06 NOTE — TELEPHONE ENCOUNTER
"Patient asking if to take High Dose Flu shot or "regular flu shot" , was told we are recommending high dose flu shot, but if insurance won't cover, can get regular strength flu shot. Patient voiced understanding.   "

## 2019-11-06 NOTE — TELEPHONE ENCOUNTER
----- Message from Esteban Del Valle sent at 11/6/2019  3:15 PM CST -----  Contact: Pt  Pt would like a call.He need to know what flu shot to take.    Pt# [phone]

## 2019-11-06 NOTE — TELEPHONE ENCOUNTER
----- Message from Miriam García MD sent at 11/6/2019 12:59 PM CST -----  Reviewed, nothing to do; repeat per routine

## 2019-11-07 ENCOUNTER — TELEPHONE (OUTPATIENT)
Dept: PHARMACY | Facility: CLINIC | Age: 64
End: 2019-11-07

## 2019-11-07 NOTE — TELEPHONE ENCOUNTER
Refill and Follow-up:  Veltassa refill confirmed. We will ship Veltassa refill on  via fedex to arrive on 11/10. $0.00 copay- 004. Confirmed 2 patient identifiers - name and .     Patient has 12 packets of Veltassa remaining and 16.8 =g ( two packets) daily. Reviewed proper administration and storage. Patient reported he administers his Veltassa sometimes with a muffin, reinforced the importance of taking his Veltassa with a meal,  from his other medication by 3 hours. Patient verbalized understanding. Patient reports he has been experiencing some mild, tolerable constipation recently. Patient instructed to drink plenty of water and take a colace as need. Patient verbalized understanding. No missed doses, no new medications, no new allergies or health conditions reported at this time. All questions answered and addressed to patient's satisfaction.

## 2019-12-05 ENCOUNTER — TELEPHONE (OUTPATIENT)
Dept: PHARMACY | Facility: CLINIC | Age: 64
End: 2019-12-05

## 2019-12-16 LAB
EXT ALBUMIN: 3.4
EXT ALKALINE PHOSPHATASE: 106
EXT ALT: 20
EXT AST: 14
EXT BILIRUBIN TOTAL: 0.4
EXT BUN: 44
EXT CALCIUM: 9
EXT CHLORIDE: 110
EXT CO2: 22
EXT CREATININE: 1.6 MG/DL
EXT GFR MDRD NON AF AMER: 44
EXT GLUCOSE: 132
EXT HEMATOCRIT: 32.7
EXT HEMOGLOBIN: 10.7
EXT PLATELETS: 173
EXT POTASSIUM: 5.3
EXT PROTEIN TOTAL: 6.8
EXT SODIUM: 137 MMOL/L
EXT TACROLIMUS LVL: 7.4
EXT WBC: 4.6

## 2019-12-20 ENCOUNTER — TELEPHONE (OUTPATIENT)
Dept: TRANSPLANT | Facility: CLINIC | Age: 64
End: 2019-12-20

## 2019-12-20 NOTE — TELEPHONE ENCOUNTER
----- Message from Miriam García MD sent at 12/20/2019  1:13 PM CST -----  Reviewed, nothing to do; repeat per routine

## 2020-01-13 DIAGNOSIS — Z94.4 LIVER TRANSPLANTED: ICD-10-CM

## 2020-01-13 RX ORDER — TACROLIMUS 1 MG/1
CAPSULE ORAL
Qty: 150 CAPSULE | Refills: 11 | Status: SHIPPED | OUTPATIENT
Start: 2020-01-13 | End: 2020-05-11 | Stop reason: DRUGHIGH

## 2020-01-27 ENCOUNTER — TELEPHONE (OUTPATIENT)
Dept: TRANSPLANT | Facility: CLINIC | Age: 65
End: 2020-01-27

## 2020-01-27 NOTE — TELEPHONE ENCOUNTER
"Returned patient call; He reported that he "accidentally ate a Little Nathalie cake before  my labs this morning, in case my sugar is high, I just wanted to let you know."    "

## 2020-01-27 NOTE — TELEPHONE ENCOUNTER
----- Message from Rin Harvey sent at 1/27/2020  8:45 AM CST -----  Contact: pt  Patient calling to speak with coordinator about labs      Call Back : [phone]

## 2020-01-28 LAB
EXT ALBUMIN: 3.5
EXT ALKALINE PHOSPHATASE: 105
EXT ALT: 12
EXT AST: 11
EXT BILIRUBIN TOTAL: 0.4
EXT BUN: 35
EXT CALCIUM: 9.3
EXT CHLORIDE: 108
EXT CO2: 24
EXT CREATININE: 1.5 MG/DL
EXT EOSINOPHIL%: 1.8
EXT GFR MDRD NON AF AMER: 47
EXT GLUCOSE: 135
EXT HEMATOCRIT: 34.5
EXT HEMOGLOBIN: 11.3
EXT LYMPH%: 13
EXT MONOCYTES%: 11.5
EXT PLATELETS: 150
EXT POTASSIUM: 5.2
EXT PROTEIN TOTAL: 7.3
EXT SEGS%: 72.9
EXT SODIUM: 138 MMOL/L
EXT TACROLIMUS LVL: 7.1
EXT WBC: 4.5

## 2020-02-06 ENCOUNTER — TELEPHONE (OUTPATIENT)
Dept: PHARMACY | Facility: CLINIC | Age: 65
End: 2020-02-06

## 2020-02-07 NOTE — TELEPHONE ENCOUNTER
RX call to inform patient that his copay for the Veltassa is $661.92. Patient reached-- I explained the copay and that there is currently no financial assistance available or grants open. Patient stated there was no way he would be able to afford the medication and asked if maybe he could be switched to something else. Patient stated he currently has about 8 days of medication remaining. I let him know I would reach out to my Pharmacists and see what we could potentially do and if there was some sort of alternative. Sending out an email to [facility] group for assistance on what steps to take..

## 2020-02-10 ENCOUNTER — OFFICE VISIT (OUTPATIENT)
Dept: GASTROENTEROLOGY | Facility: CLINIC | Age: 65
End: 2020-02-10
Payer: MEDICARE

## 2020-02-10 VITALS
HEIGHT: 74 IN | DIASTOLIC BLOOD PRESSURE: 76 MMHG | SYSTOLIC BLOOD PRESSURE: 142 MMHG | BODY MASS INDEX: 32.68 KG/M2 | WEIGHT: 254.63 LBS | HEART RATE: 86 BPM

## 2020-02-10 DIAGNOSIS — E87.5 HYPERKALEMIA: ICD-10-CM

## 2020-02-10 DIAGNOSIS — Z94.4 LIVER TRANSPLANTED: ICD-10-CM

## 2020-02-10 DIAGNOSIS — D84.9 IMMUNOSUPPRESSION: Primary | ICD-10-CM

## 2020-02-10 DIAGNOSIS — I10 ESSENTIAL HYPERTENSION: ICD-10-CM

## 2020-02-10 PROBLEM — L89.152 STAGE II PRESSURE ULCER OF SACRAL REGION: Status: RESOLVED | Noted: 2018-07-17 | Resolved: 2020-02-10

## 2020-02-10 PROBLEM — N18.9 ACUTE KIDNEY INJURY SUPERIMPOSED ON CHRONIC KIDNEY DISEASE: Status: RESOLVED | Noted: 2018-01-21 | Resolved: 2020-02-10

## 2020-02-10 PROBLEM — R53.81 PHYSICAL DECONDITIONING: Status: RESOLVED | Noted: 2018-06-22 | Resolved: 2020-02-10

## 2020-02-10 PROBLEM — N17.9 ACUTE KIDNEY INJURY SUPERIMPOSED ON CHRONIC KIDNEY DISEASE: Status: RESOLVED | Noted: 2018-01-21 | Resolved: 2020-02-10

## 2020-02-10 PROCEDURE — 99213 PR OFFICE/OUTPT VISIT, EST, LEVL III, 20-29 MIN: ICD-10-PCS | Mod: S$PBB,,, | Performed by: INTERNAL MEDICINE

## 2020-02-10 PROCEDURE — 99213 OFFICE O/P EST LOW 20 MIN: CPT | Mod: PBBFAC | Performed by: INTERNAL MEDICINE

## 2020-02-10 PROCEDURE — 99999 PR PBB SHADOW E&M-EST. PATIENT-LVL III: ICD-10-PCS | Mod: PBBFAC,,, | Performed by: INTERNAL MEDICINE

## 2020-02-10 PROCEDURE — 99213 OFFICE O/P EST LOW 20 MIN: CPT | Mod: S$PBB,,, | Performed by: INTERNAL MEDICINE

## 2020-02-10 PROCEDURE — 99999 PR PBB SHADOW E&M-EST. PATIENT-LVL III: CPT | Mod: PBBFAC,,, | Performed by: INTERNAL MEDICINE

## 2020-02-10 RX ORDER — FLUDROCORTISONE ACETATE 0.1 MG/1
100 TABLET ORAL DAILY
Qty: 30 TABLET | Refills: 5 | Status: SHIPPED | OUTPATIENT
Start: 2020-02-10 | End: 2020-03-10 | Stop reason: SINTOL

## 2020-02-10 NOTE — PROGRESS NOTES
Transplant Hepatology  Liver Transplant Recipient Follow-up    Transplant Date: 7/23/2018  [facility] Native Liver Dx: Alcoholic Cirrhosis    Alon is here for follow up of his liver transplant.    ORGAN: LIVER  Whole or Partial: whole liver  Donor Type: donation after brain death  CDC High Risk: no  Donor CMV Status: Negative  Donor HCV Status: Negative  Donor HBcAb: Negative  Biliary Anastomosis: end to end  Arterial Anatomy: standard  IVC reconstruction: end to end ivc  Portal vein status: partial patent  .    Subjective:     Interval History:  Currently, he is doing with difficulty. Current complaints include continued issues with his shoulders.  He has not had surgery for that yet.  Otherwise he reports in the doughnut hole for Veltassa and most of is any other medication he can try for his hyperkalemia.  His weight has been going up please trying to make sure he stays closer to 240 lb.  Overall he has been feeling well.  No major liver related issues.    Review of Systems    Objective:     Physical Exam   Constitutional: He is oriented to person, place, and time. He appears well-developed and well-nourished. No distress.   HENT:   Head: Normocephalic and atraumatic.   Mouth/Throat: No oropharyngeal exudate.   Poor dentition   Eyes: Pupils are equal, round, and reactive to light. Conjunctivae are normal. Right eye exhibits no discharge. Left eye exhibits no discharge. No scleral icterus.   Pulmonary/Chest: Effort normal and breath sounds normal. No respiratory distress. He has no wheezes.   Abdominal: Soft. He exhibits no distension. There is no tenderness.   Musculoskeletal: He exhibits no edema.   Neurological: He is alert and oriented to person, place, and time.   Psychiatric: He has a normal mood and affect. His behavior is normal.   Vitals reviewed.      WBC   Date Value Ref Range Status   09/27/2018 5.39 3.90 - 12.70 K/uL Final     Hemoglobin   Date Value Ref Range Status   09/27/2018 9.8 (L) 14.0 - 18.0 g/dL  Final     POC Hematocrit   Date Value Ref Range Status   07/23/2018 25 (L) 36 - 54 %PCV Final     Hematocrit   Date Value Ref Range Status   09/27/2018 30.3 (L) 40.0 - 54.0 % Final     Platelets   Date Value Ref Range Status   09/27/2018 114 (L) 150 - 350 K/uL Final     BUN, Bld   Date Value Ref Range Status   09/27/2018 77 (H) 8 - 23 mg/dL Final     Creatinine   Date Value Ref Range Status   09/27/2018 2.4 (H) 0.5 - 1.4 mg/dL Final     Glucose   Date Value Ref Range Status   09/27/2018 138 (H) 70 - 110 mg/dL Final     Calcium   Date Value Ref Range Status   09/27/2018 10.3 8.7 - 10.5 mg/dL Final     Sodium   Date Value Ref Range Status   09/27/2018 138 136 - 145 mmol/L Final     Potassium   Date Value Ref Range Status   09/27/2018 5.1 3.5 - 5.1 mmol/L Final     Chloride   Date Value Ref Range Status   09/27/2018 104 95 - 110 mmol/L Final     Magnesium   Date Value Ref Range Status   09/10/2018 2.1 1.6 - 2.6 mg/dL Final     AST   Date Value Ref Range Status   09/27/2018 11 10 - 40 U/L Final     ALT   Date Value Ref Range Status   09/27/2018 9 (L) 10 - 44 U/L Final     Alkaline Phosphatase   Date Value Ref Range Status   09/27/2018 69 55 - 135 U/L Final     Total Bilirubin   Date Value Ref Range Status   09/27/2018 0.6 0.1 - 1.0 mg/dL Final     Comment:     For infants and newborns, interpretation of results should be based  on gestational age, weight and in agreement with clinical  observations.  Premature Infant recommended reference ranges:  Up to 24 hours.............<8.0 mg/dL  Up to 48 hours............<12.0 mg/dL  3-5 days..................<15.0 mg/dL  6-29 days.................<15.0 mg/dL       Albumin   Date Value Ref Range Status   09/27/2018 4.0 3.5 - 5.2 g/dL Final     INR   Date Value Ref Range Status   07/31/2018 1.2 0.8 - 1.2 Final     Comment:     Coumadin Therapy:  2.0 - 3.0 for INR for all indicators except mechanical heart valves  and antiphospholipid syndromes which should use 2.5 - 3.5.       Lab  Results   Component Value Date    TACROLIMUS 5.1 09/27/2018           Assessment/Plan:     1. Immunosuppression    2. Liver transplanted    3. Hyperkalemia    4. Essential hypertension        Immunosuppression  -continue current immunosuppression    Liver transplanted-good allograft function  -check labs next week given change in medication    Hyperkalemia  -discontinue Veltassa and try florinef  -check transplant labs next week to monitor potassium  -     fludrocortisone (FLORINEF) 0.1 mg Tab; Take 1 tablet (100 mcg total) by mouth once daily.  Dispense: 30 tablet; Refill: 5    Essential hypertension  -advised patient monitor while on Florinef also advised strict control to improve renal function    Patient was seen in the liver transplant department at [facility].        Miriam García MD         [facility] Patient Status  Functional Status: 60% - Requires occasional assistance but is able to care for needs  Physical Capacity: Limited Mobility

## 2020-02-21 ENCOUNTER — TELEPHONE (OUTPATIENT)
Dept: TRANSPLANT | Facility: CLINIC | Age: 65
End: 2020-02-21

## 2020-02-25 ENCOUNTER — NURSE TRIAGE (OUTPATIENT)
Dept: ADMINISTRATIVE | Facility: CLINIC | Age: 65
End: 2020-02-25

## 2020-02-25 NOTE — TELEPHONE ENCOUNTER
Liver [facility] 7/23/18  Tried calling the pharmacy, but they are closed.  He called a week ago Monday to order his prograf, it was sent out on Wednesday, [employer] has misplaced it he thinks, tracking shows it's been on the truck for delivery for 2 days.  He is wondering if he can get the rx transferred to a local pharmacy?  He has enough to get him through  tomorrow evening.   I advised he find a local pharmacy that has prograf in stock and if the rx does not come get delivered today, he should call his coordinator first thing tomorrow morning and let them know which pharmacy to send the rx to.  He is aware he will have to pay oop for the rx.  Will send message to [facility] clinical in box, as well.    Reason for Disposition   Caller requesting a NON-URGENT new prescription or refill and triager unable to refill per unit policy    Additional Information   Negative: Drug overdose and nurse unable to answer question   Negative: Caller requesting information not related to medicine   Negative: Caller requesting a prescription for Strep throat and has a positive culture result   Negative: Rash while taking a medication or within 3 days of stopping it   Negative: Immunization reaction suspected   Negative: [1] Asthma AND [2] having symptoms of asthma (cough, wheezing, etc)   Negative: MORE THAN A DOUBLE DOSE of a prescription or over-the-counter (OTC) drug   Negative: [1] DOUBLE DOSE (an extra dose or lesser amount) of over-the-counter (OTC) drug AND [2] any symptoms (e.g., dizziness, nausea, pain, sleepiness)   Negative: [1] DOUBLE DOSE (an extra dose or lesser amount) of prescription drug AND [2] any symptoms (e.g., dizziness, nausea, pain, sleepiness)   Negative: Took another person's prescription drug   Negative: [1] DOUBLE DOSE (an extra dose or lesser amount) of prescription drug AND [2] NO symptoms (Exception: a double dose of antibiotics)   Negative: Diabetes drug error or overdose (e.g., insulin or extra dose)    "Negative: [1] Request for URGENT new prescription or refill of "essential" medication (i.e., likelihood of harm to patient if not taken) AND [2] triager unable to fill per unit policy   Negative: [1] Prescription not at pharmacy AND [2] was prescribed today by PCP   Negative: Pharmacy calling with prescription questions and triager unable to answer question   Negative: Caller has urgent medication question about med that PCP prescribed and triager unable to answer question    Protocols used: MEDICATION QUESTION CALL-A-AH      "

## 2020-02-27 LAB
EXT ALBUMIN: 3.3
EXT ALKALINE PHOSPHATASE: 125
EXT ALT: 30
EXT AST: 19
EXT BILIRUBIN TOTAL: 0.3
EXT BUN: 31
EXT CALCIUM: 9.2
EXT CHLORIDE: 108
EXT CO2: 27
EXT CREATININE: 1.2 MG/DL
EXT GFR MDRD NON AF AMER: >60
EXT GLUCOSE: 157
EXT HEMATOCRIT: 32.8
EXT HEMOGLOBIN: 10.8
EXT PLATELETS: 169
EXT POTASSIUM: 5.3
EXT PROTEIN TOTAL: 6.8
EXT SODIUM: 138 MMOL/L
EXT TACROLIMUS LVL: 6.3
EXT WBC: 4.9

## 2020-02-28 ENCOUNTER — TELEPHONE (OUTPATIENT)
Dept: TRANSPLANT | Facility: CLINIC | Age: 65
End: 2020-02-28

## 2020-02-28 NOTE — TELEPHONE ENCOUNTER
----- Message from Daphne Cortez MA sent at 2/28/2020  1:29 PM CST -----  Contact: [phone]    Calling for the most recent lab results.     [phone]

## 2020-02-28 NOTE — TELEPHONE ENCOUNTER
Returned patient call; informed him that results were ok, but still awaiting Prograf level to be resulted and reviewed. He was able to voice understanding.

## 2020-03-04 ENCOUNTER — TELEPHONE (OUTPATIENT)
Dept: TRANSPLANT | Facility: CLINIC | Age: 65
End: 2020-03-04

## 2020-03-04 NOTE — TELEPHONE ENCOUNTER
"Returned patient call; he stated that  said he may get some fluid on his legs and feet with start of Florinef medication and said that he "indeed" has some mild fluid in his legs.  , he also reported "gaining weight , especially in my stomach area" . Reported that his blood pressure is "holding it's own".  He said that  told him to let her know if he got swelling.   Will review with .    "

## 2020-03-04 NOTE — TELEPHONE ENCOUNTER
----- Message from Loulou Melchor LPN sent at 3/4/2020  3:34 PM CST -----  Contact: PT      ----- Message -----  From: Cierra Day  Sent: 3/4/2020   9:27 AM CST  To: Ricky AGUERO Staff    PT called to report that the new medication is in deed causing swelling in his legs and feet    Callback: [phone]

## 2020-03-06 NOTE — TELEPHONE ENCOUNTER
FOR DOCUMENTATION ONLY:    Financial Assistance for Priyankjoanneluis alfredo approved from 1/17/2020 to 1/16/2021    Source: [employer] Ahsan     ID: [id number]  BIN: [id number]  PCN: [id number]  GRP: [id number]    Amount: $1,600/yr

## 2020-03-09 ENCOUNTER — TELEPHONE (OUTPATIENT)
Dept: TRANSPLANT | Facility: CLINIC | Age: 65
End: 2020-03-09

## 2020-03-09 NOTE — TELEPHONE ENCOUNTER
----- Message from Miriam García MD sent at 3/6/2020  3:53 PM CST -----  Reviewed, nothing to do; repeat per routine

## 2020-03-09 NOTE — TELEPHONE ENCOUNTER
Patient requested appointment this week to see  to evaluate swelling issues.  Message sent to her nurse Ladan requesting appointment.

## 2020-03-09 NOTE — TELEPHONE ENCOUNTER
----- Message from Norma Gutiérrez sent at 3/9/2020 10:22 AM CDT -----  Pt is calling to speak to coordinator      Pt contact [phone]

## 2020-03-10 ENCOUNTER — TELEPHONE (OUTPATIENT)
Dept: TRANSPLANT | Facility: CLINIC | Age: 65
End: 2020-03-10

## 2020-03-10 NOTE — TELEPHONE ENCOUNTER
----- Message from Loulou Melchor LPN sent at 3/9/2020  4:23 PM CDT -----  Dr. García states to have patient stop florinef and get patient to have labs next week to check potassium.     Thanks  ----- Message -----  From: Helen Calzada  Sent: 3/9/2020  10:34 AM CDT  To: Loulou Melchor LPN    Patient asking if  can see him this week to evaluate swelling he has in his abdomen and lower extremities (since starting Florinef).

## 2020-03-10 NOTE — TELEPHONE ENCOUNTER
Patient notified and instructed to hold the Florinef and repeat labs next week to follow up potassium level. Instructed patient to continue to follow low potassium diet as instructed.  He was able to repeat instructions and voiced understanding.  Patient also said he will call back with update on swelling after stopping the Florinef.

## 2020-03-19 ENCOUNTER — TELEPHONE (OUTPATIENT)
Dept: TRANSPLANT | Facility: CLINIC | Age: 65
End: 2020-03-19

## 2020-03-19 LAB
EXT ALBUMIN: 3.5
EXT ALKALINE PHOSPHATASE: 95
EXT ALT: 10
EXT AST: 6
EXT BILIRUBIN TOTAL: 0.5
EXT BUN: 38
EXT CALCIUM: 8.6
EXT CHLORIDE: 107
EXT CO2: 29
EXT CREATININE: 1.5 MG/DL
EXT GFR MDRD NON AF AMER: 47
EXT GLUCOSE: 129
EXT HEMATOCRIT: 32.5
EXT HEMOGLOBIN: 10.6
EXT PLATELETS: 153
EXT POTASSIUM: 5.6
EXT PROTEIN TOTAL: 6.9
EXT SODIUM: 139 MMOL/L
EXT TACROLIMUS LVL: 5.6
EXT WBC: 4.2

## 2020-03-19 NOTE — TELEPHONE ENCOUNTER
----- Message from Esteban Del Valle sent at 3/19/2020 12:33 PM CDT -----  Contact: Pt  Pt is calling to speak with nurse about labs from [place].    Pt# [phone]

## 2020-03-19 NOTE — TELEPHONE ENCOUNTER
Patient called to inquire if his labs have been received: he was informed that no labs received.  Called and spoke to lab , requested lab results to be faxed to our office.

## 2020-03-20 ENCOUNTER — TELEPHONE (OUTPATIENT)
Dept: PHARMACY | Facility: CLINIC | Age: 65
End: 2020-03-20

## 2020-03-20 NOTE — TELEPHONE ENCOUNTER
Per FREDDIE Huffman: patient to re-start Veltassa(Rx. Sent to [facility]). Patient notified and instructed, he was able to repeat instructions and voiced understanding. Repeat labs due 1 week (patient will go on 3/30 he said).

## 2020-03-20 NOTE — TELEPHONE ENCOUNTER
Patient contacted [facility] regarding a refill for his Veltassa. We still do not have an active prescription on file. Informed patient I will message MD office to request a new prescription for Veltassa, but to call the office to let them know that we still have not received anything. He inquired when he would be able to have the medication shipped - informed him that we are able to ship Monday-Thursday. Once the script is received, we could process to see if it runs through insurance and it processes with no issues and we have it in stock, we could ship it. He has no further questions or concerns at this time. He is aware to contact [facility] if he needs anything.

## 2020-03-30 ENCOUNTER — TELEPHONE (OUTPATIENT)
Dept: TRANSPLANT | Facility: CLINIC | Age: 65
End: 2020-03-30

## 2020-03-30 NOTE — TELEPHONE ENCOUNTER
----- Message from Miriam García MD sent at 3/30/2020  1:55 PM CDT -----  Potassium levels only slightly elevated.  Will continue to monitor.  Patient should be restarting on Veltassa if he was unable to tolerate the fludrocortisone.  Repeat labs this week.

## 2020-03-31 ENCOUNTER — TELEPHONE (OUTPATIENT)
Dept: TRANSPLANT | Facility: CLINIC | Age: 65
End: 2020-03-31

## 2020-03-31 NOTE — TELEPHONE ENCOUNTER
----- Message from Norma Gutiérrez sent at 3/31/2020  9:02 AM CDT -----  Pt calling to verify labs on 4/6      Pt contact [phone]

## 2020-04-02 LAB
EXT ALBUMIN: 3.5
EXT ALKALINE PHOSPHATASE: 97
EXT ALT: 14
EXT AST: 10
EXT BILIRUBIN TOTAL: 0.5
EXT BUN: 31
EXT CALCIUM: 8.9
EXT CHLORIDE: 107
EXT CO2: 27
EXT CREATININE: 1.4 MG/DL
EXT GFR MDRD NON AF AMER: 51
EXT GLUCOSE: 127
EXT HEMATOCRIT: 32.7
EXT HEMOGLOBIN: 10.6
EXT PLATELETS: 140
EXT POTASSIUM: 5.9
EXT PROTEIN TOTAL: 6.8
EXT SODIUM: 137 MMOL/L
EXT TACROLIMUS LVL: 9
EXT WBC: 4.8

## 2020-04-09 ENCOUNTER — TELEPHONE (OUTPATIENT)
Dept: TRANSPLANT | Facility: CLINIC | Age: 65
End: 2020-04-09

## 2020-04-09 NOTE — TELEPHONE ENCOUNTER
Patient notified and instructed to repeat labs on Monday, he was able to repeat instructions and voiced understanding.

## 2020-04-09 NOTE — TELEPHONE ENCOUNTER
----- Message from Miriam García MD sent at 4/9/2020  3:49 PM CDT -----  Tacrolimus level is high for unclear reasons which is likely also driving up potassium.  Needs repeat full set transplant labs.

## 2020-04-15 LAB
EXT ALBUMIN: 3.6
EXT ALKALINE PHOSPHATASE: 109
EXT ALT: 14
EXT AST: 8
EXT BILIRUBIN TOTAL: 0.4
EXT BUN: 39
EXT CALCIUM: 9.2
EXT CHLORIDE: 105
EXT CO2: 25
EXT CREATININE: 1.6 MG/DL
EXT GFR MDRD NON AF AMER: 44
EXT GLUCOSE: 123
EXT HEMATOCRIT: 34
EXT HEMOGLOBIN: 11.2
EXT PLATELETS: 167
EXT POTASSIUM: 5.6
EXT PROTEIN TOTAL: 7.1
EXT SODIUM: 136 MMOL/L
EXT TACROLIMUS LVL: 7.2
EXT WBC: 4

## 2020-04-17 ENCOUNTER — TELEPHONE (OUTPATIENT)
Dept: TRANSPLANT | Facility: CLINIC | Age: 65
End: 2020-04-17

## 2020-04-17 NOTE — TELEPHONE ENCOUNTER
----- Message from Miriam García MD sent at 4/16/2020  4:37 PM CDT -----  Continue with Veltassa, repeat labs next week

## 2020-04-17 NOTE — TELEPHONE ENCOUNTER
Patient notified and instructed to continue veltassa and repeat labs next week, he was able to voice understanding.

## 2020-04-20 LAB
EXT ALBUMIN: 3.4
EXT ALKALINE PHOSPHATASE: 240
EXT ALT: 72
EXT AST: 61
EXT BILIRUBIN TOTAL: 0.3
EXT BUN: 40
EXT CALCIUM: 9
EXT CHLORIDE: 106
EXT CO2: 25
EXT CREATININE: 1.6 MG/DL
EXT GFR MDRD NON AF AMER: 44
EXT GLUCOSE: 148
EXT HEMATOCRIT: 33.9
EXT HEMOGLOBIN: 11.3
EXT PLATELETS: 147
EXT POTASSIUM: 5
EXT PROTEIN TOTAL: 7.4
EXT SODIUM: 136 MMOL/L
EXT TACROLIMUS LVL: 9.8
EXT WBC: 4

## 2020-04-27 ENCOUNTER — TELEPHONE (OUTPATIENT)
Dept: TRANSPLANT | Facility: CLINIC | Age: 65
End: 2020-04-27

## 2020-04-27 NOTE — TELEPHONE ENCOUNTER
----- Message from Miriam García MD sent at 4/27/2020  3:34 PM CDT -----  Tacrolimus level is adequate.  Patient should be having repeat labs this week.  If persistently elevated then we will need to do further evaluation P

## 2020-04-27 NOTE — TELEPHONE ENCOUNTER
----- Message from Olga Gant sent at 4/27/2020  1:33 PM CDT -----  Contact: pt  Reason: Calling to speak with coordinator pertaining to labs done (results)    Communication: [phone]

## 2020-04-27 NOTE — TELEPHONE ENCOUNTER
Spoke to patient: he reported seeing lab results in the [facility] portal and noticed elevation of liver tests. He said he did not miss any doses of his anti-rejection medicine and is curious what  thinks.  Will review with MD.

## 2020-04-29 LAB
EXT ALBUMIN: 3.4
EXT ALKALINE PHOSPHATASE: 119
EXT ALT: 17
EXT AST: 13
EXT BILIRUBIN TOTAL: 0.3
EXT BUN: 37
EXT CALCIUM: 9
EXT CHLORIDE: 108
EXT CO2: 27
EXT CREATININE: 1.6 MG/DL
EXT GFR MDRD NON AF AMER: 44
EXT GLUCOSE: 139
EXT HEMATOCRIT: 33.4
EXT HEMOGLOBIN: 11
EXT PLATELETS: 148
EXT POTASSIUM: 5.4
EXT PROTEIN TOTAL: 7.5
EXT SODIUM: 137 MMOL/L
EXT TACROLIMUS LVL: 9.2
EXT WBC: 4.1

## 2020-05-06 LAB
EXT ALBUMIN: 3.3
EXT ALKALINE PHOSPHATASE: 125
EXT ALT: 32
EXT AST: 12
EXT BILIRUBIN TOTAL: 0.4
EXT BUN: 38
EXT CALCIUM: 8.9
EXT CHLORIDE: 107
EXT CO2: 29
EXT CREATININE: 1.7 MG/DL
EXT GFR MDRD NON AF AMER: 41
EXT GLUCOSE: 119
EXT HEMATOCRIT: 32.7
EXT HEMOGLOBIN: 10.7
EXT PLATELETS: 151
EXT POTASSIUM: 5.5
EXT PROTEIN TOTAL: 7.2
EXT SODIUM: 137 MMOL/L
EXT TACROLIMUS LVL: 8.6
EXT WBC: 4.8

## 2020-05-11 DIAGNOSIS — Z94.4 LIVER TRANSPLANTED: ICD-10-CM

## 2020-05-11 NOTE — TELEPHONE ENCOUNTER
Patient notified and instructed to reduce Prograf dose to 2mg twice daily and repeat labs next week. He was able to repeat instructions and voiced understanding.

## 2020-05-11 NOTE — TELEPHONE ENCOUNTER
----- Message from Miriam García MD sent at 5/10/2020  3:34 PM CDT -----  Decrease tacrolimus to 2 mg twice a day and repeat labs in a week

## 2020-05-12 RX ORDER — TACROLIMUS 1 MG/1
CAPSULE ORAL
Qty: 120 CAPSULE | Refills: 11 | Status: SHIPPED | OUTPATIENT
Start: 2020-05-12 | End: 2020-05-21

## 2020-05-15 ENCOUNTER — TELEPHONE (OUTPATIENT)
Dept: PHARMACY | Facility: CLINIC | Age: 65
End: 2020-05-15

## 2020-05-18 ENCOUNTER — TELEPHONE (OUTPATIENT)
Dept: GASTROENTEROLOGY | Facility: CLINIC | Age: 65
End: 2020-05-18

## 2020-05-18 NOTE — TELEPHONE ENCOUNTER
Helena called from [facility] and stated that patient had a critical Potassium lab of 6.2. Informed Dr. García and documented in patients flow sheet in chart.

## 2020-05-19 DIAGNOSIS — Z94.4 LIVER TRANSPLANTED: ICD-10-CM

## 2020-05-19 LAB
EXT ALBUMIN: 3.5
EXT ALKALINE PHOSPHATASE: 122
EXT ALT: 23
EXT AST: 15
EXT BILIRUBIN TOTAL: 0.4
EXT BUN: 40
EXT CALCIUM: 9
EXT CHLORIDE: 107
EXT CO2: 27
EXT CREATININE: 1.8 MG/DL
EXT GFR MDRD NON AF AMER: 38
EXT GLUCOSE: 136
EXT HEMATOCRIT: 33.6
EXT HEMOGLOBIN: 10.9
EXT PLATELETS: 162
EXT POTASSIUM: 6.2
EXT PROTEIN TOTAL: 7.1
EXT SODIUM: 136 MMOL/L
EXT TACROLIMUS LVL: 7.1
EXT WBC: 5.2

## 2020-05-19 RX ORDER — TACROLIMUS 1 MG/1
CAPSULE ORAL
Qty: 120 CAPSULE | Refills: 11 | Status: CANCELLED | OUTPATIENT
Start: 2020-05-19

## 2020-05-20 ENCOUNTER — TELEPHONE (OUTPATIENT)
Dept: TRANSPLANT | Facility: CLINIC | Age: 65
End: 2020-05-20

## 2020-05-20 NOTE — TELEPHONE ENCOUNTER
Patient notified that Kayexelate Rx. Was sent in per  to his local pharmacy and he should pick it up and take it. He was able to voice understanding.

## 2020-05-20 NOTE — TELEPHONE ENCOUNTER
Returned patient call; informed him that  was informed of elevated potassium level by her clinic staff on Monday.  Awaiting her response on lab review and if Veltassa dose will be increased. He was able to voice understanding.

## 2020-05-20 NOTE — TELEPHONE ENCOUNTER
----- Message from Esteban Del Valle sent at 5/20/2020 12:46 PM CDT -----  Contact: Pt  Pt is calling to speak with nurse about increasing meds because potassium is high.    Pt# [phone]

## 2020-05-21 ENCOUNTER — TELEPHONE (OUTPATIENT)
Dept: TRANSPLANT | Facility: CLINIC | Age: 65
End: 2020-05-21

## 2020-05-21 DIAGNOSIS — Z94.4 LIVER TRANSPLANTED: ICD-10-CM

## 2020-05-21 RX ORDER — TACROLIMUS 1 MG/1
CAPSULE ORAL
Qty: 120 CAPSULE | Refills: 11 | Status: CANCELLED | OUTPATIENT
Start: 2020-05-19

## 2020-05-21 RX ORDER — TACROLIMUS 1 MG/1
CAPSULE ORAL
Qty: 150 CAPSULE | Refills: 11 | OUTPATIENT
Start: 2020-05-21

## 2020-05-21 RX ORDER — TACROLIMUS 1 MG/1
CAPSULE ORAL
Qty: 150 CAPSULE | Refills: 11 | Status: SHIPPED | OUTPATIENT
Start: 2020-05-21 | End: 2020-05-28

## 2020-05-21 NOTE — TELEPHONE ENCOUNTER
----- Message from Miriam García MD sent at 5/20/2020  5:24 PM CDT -----  Double veltassa dose given hyperkalemia and repeat labs next week

## 2020-05-21 NOTE — TELEPHONE ENCOUNTER
Message left for patient with instructions to increase Veltassa dose to 2 packets daily and repeat labs on Monday 5/25/20. Requested a call back for any questions.

## 2020-05-26 LAB
EXT ALBUMIN: 3.2
EXT ALKALINE PHOSPHATASE: 111
EXT ALT: 24
EXT AST: 12
EXT BILIRUBIN TOTAL: 0.3
EXT BUN: 36
EXT CALCIUM: 8.4
EXT CHLORIDE: 108
EXT CO2: 27
EXT CREATININE: 1.8 MG/DL
EXT GFR MDRD NON AF AMER: 38
EXT GLUCOSE: 146
EXT HEMATOCRIT: 33.1
EXT HEMOGLOBIN: 10.6
EXT PLATELETS: 163
EXT POTASSIUM: 5.2
EXT PROTEIN TOTAL: 6.6
EXT SODIUM: 138 MMOL/L
EXT TACROLIMUS LVL: 8.2
EXT WBC: 4.2

## 2020-05-28 ENCOUNTER — TELEPHONE (OUTPATIENT)
Dept: PHARMACY | Facility: CLINIC | Age: 65
End: 2020-05-28

## 2020-05-28 DIAGNOSIS — Z94.4 LIVER TRANSPLANTED: ICD-10-CM

## 2020-05-29 DIAGNOSIS — Z94.4 LIVER TRANSPLANTED: ICD-10-CM

## 2020-05-29 RX ORDER — TACROLIMUS 1 MG/1
2 CAPSULE ORAL EVERY 12 HOURS
Qty: 120 CAPSULE | Refills: 11 | Status: SHIPPED | OUTPATIENT
Start: 2020-05-29 | End: 2020-05-29 | Stop reason: DRUGHIGH

## 2020-05-29 NOTE — TELEPHONE ENCOUNTER
Patient notified and instructed to reduce Prograf dose to 2mg in AM and 1mg in PM; repeat labs on Monday. He was able to repeat instructions and voiced understanding.

## 2020-05-29 NOTE — TELEPHONE ENCOUNTER
----- Message from Miriam García MD sent at 5/29/2020  2:12 PM CDT -----  Decrease tacrolimus to 2mg in the morning and 1mg in the evening; repeat labs next week

## 2020-06-01 ENCOUNTER — TELEPHONE (OUTPATIENT)
Dept: GASTROENTEROLOGY | Facility: CLINIC | Age: 65
End: 2020-06-01

## 2020-06-01 DIAGNOSIS — Z94.4 LIVER TRANSPLANTED: ICD-10-CM

## 2020-06-01 RX ORDER — TACROLIMUS 1 MG/1
CAPSULE ORAL
Qty: 90 CAPSULE | Refills: 11 | Status: SHIPPED | OUTPATIENT
Start: 2020-06-01 | End: 2021-06-11 | Stop reason: SDUPTHER

## 2020-06-01 RX ORDER — TACROLIMUS 1 MG/1
CAPSULE ORAL
Qty: 90 CAPSULE | Refills: 11 | Status: SHIPPED | OUTPATIENT
Start: 2020-06-01 | End: 2020-06-01

## 2020-06-01 NOTE — TELEPHONE ENCOUNTER
Janay Thomas Ms. Lab called and stated that patients CMP was cancelled due to it being Hemalized. I informed her I would let someone know. She verbalized understanding.

## 2020-06-05 ENCOUNTER — TELEPHONE (OUTPATIENT)
Dept: TRANSPLANT | Facility: CLINIC | Age: 65
End: 2020-06-05

## 2020-06-05 LAB
EXT HEMATOCRIT: 34.6
EXT HEMOGLOBIN: 11.2
EXT PLATELETS: 167
EXT TACROLIMUS LVL: 6
EXT WBC: 4.7

## 2020-06-05 NOTE — TELEPHONE ENCOUNTER
Notified by patient's lab that CMP tube clotted.  Patient instructed to repeat labs on Monday,. He voiced understanding.

## 2020-06-08 LAB
EXT ALBUMIN: 3.2
EXT ALKALINE PHOSPHATASE: 100
EXT ALT: 19
EXT AST: 18
EXT BILIRUBIN TOTAL: 0.3
EXT BUN: 29
EXT CALCIUM: 8.9
EXT CHLORIDE: 107
EXT CO2: 26
EXT CREATININE: 1.4 MG/DL
EXT GFR MDRD NON AF AMER: 51
EXT GLUCOSE: 145
EXT HEMATOCRIT: 33.8
EXT HEMOGLOBIN: 11.1
EXT PLATELETS: 156
EXT POTASSIUM: 5.1
EXT PROTEIN TOTAL: 6.7
EXT SODIUM: 137 MMOL/L
EXT TACROLIMUS LVL: 5.5
EXT WBC: 5.2

## 2020-06-10 ENCOUNTER — TELEPHONE (OUTPATIENT)
Dept: TRANSPLANT | Facility: CLINIC | Age: 65
End: 2020-06-10

## 2020-06-10 NOTE — TELEPHONE ENCOUNTER
----- Message from Miriam García MD sent at 6/10/2020  3:05 PM CDT -----  Reviewed, nothing to do; repeat per routine

## 2020-06-12 ENCOUNTER — TELEPHONE (OUTPATIENT)
Dept: TRANSPLANT | Facility: CLINIC | Age: 65
End: 2020-06-12

## 2020-06-12 NOTE — TELEPHONE ENCOUNTER
----- Message from Norma Gutiérrez sent at 6/12/2020  3:27 PM CDT -----  Pt is calling to speak to coordinator      Pt contact [phone]

## 2020-06-12 NOTE — TELEPHONE ENCOUNTER
Returned patient call ; informed him recent labs were stable. Lab Letter was sent to him and next labs due 7/8/20. He was able to voice understanding.

## 2020-06-22 ENCOUNTER — TELEPHONE (OUTPATIENT)
Dept: TRANSPLANT | Facility: CLINIC | Age: 65
End: 2020-06-22

## 2020-06-22 NOTE — TELEPHONE ENCOUNTER
"Returned patient call; he reported seeing his local doctor for leg swelling and "had doppler of both legs done, and it showed a chronic blood clot in my left leg."  Said the doctor wants him to take a full dose aspirin for 1 week, then to return to see him, he said .  Informed patient ok from liver standpoint but recommend he take the coated aspirin so it won't upset his stomach. He was able to voice understanding .    "

## 2020-06-22 NOTE — TELEPHONE ENCOUNTER
----- Message from Esteban Del Valle sent at 6/22/2020 12:03 PM CDT -----  Regarding: speak with nurse  Contact: Alon Price calling to speak with nurse about going on full force aspirin. Pt was calling to see if that is okay.    Pt# [phone]

## 2020-07-08 ENCOUNTER — TELEPHONE (OUTPATIENT)
Dept: TRANSPLANT | Facility: CLINIC | Age: 65
End: 2020-07-08

## 2020-07-08 NOTE — TELEPHONE ENCOUNTER
Notified by patient's local lab : Potassium level today of 6.9 (no visible hemolysis noted).  Patient notified and instructed to go to his local ER for evaluation and treatment of this panic value, per our guidelines.  He was able to repeat instructions and voiced understanding.

## 2020-07-08 NOTE — TELEPHONE ENCOUNTER
----- Message from Lobo Medina sent at 7/8/2020  4:50 PM CDT -----  Pt calling to speak with coord in regards of new meds he has, and if they go with his current Rx      [phone]

## 2020-07-09 ENCOUNTER — TELEPHONE (OUTPATIENT)
Dept: TRANSPLANT | Facility: CLINIC | Age: 65
End: 2020-07-09

## 2020-07-09 DIAGNOSIS — Z94.4 LIVER REPLACED BY TRANSPLANT: Primary | ICD-10-CM

## 2020-07-09 DIAGNOSIS — E83.41 HYPERMAGNESEMIA: ICD-10-CM

## 2020-07-09 LAB
EXT ALBUMIN: 3.7
EXT ALKALINE PHOSPHATASE: 124
EXT ALT: 20
EXT AST: 16
EXT BILIRUBIN TOTAL: 0.3
EXT BUN: 41
EXT CALCIUM: 9.1
EXT CHLORIDE: 107
EXT CO2: 24
EXT CREATININE: 2.5 MG/DL
EXT EOSINOPHIL%: 1.2
EXT GFR MDRD NON AF AMER: 26
EXT GLUCOSE: 124
EXT HEMATOCRIT: 36.6
EXT HEMOGLOBIN: 11.8
EXT LYMPH%: 10.9
EXT MAGNESIUM: 3.1
EXT MONOCYTES%: 12.6
EXT PLATELETS: 181
EXT POTASSIUM: 6.9
EXT PROTEIN TOTAL: 7.6
EXT SEGS%: 74.4
EXT SODIUM: 134 MMOL/L
EXT TACROLIMUS LVL: 6.9
EXT WBC: 5.4

## 2020-07-09 NOTE — TELEPHONE ENCOUNTER
"Patient reported that he went to the ER yesterday and was given instructions to take " a total of 3 packets of Veltassa daily for a week "  Per the literature max dose is 3 packets (25.2 grams)daily. Patient was informed.  He also asked for labs on 7/15, before he sees  same day.  Informed him that I am awaiting lab review by  on this week's labs.  He was able to voice understanding.  He also reported that he was told in the ER that the repeat potassium level was "6.1", and said he was told his magnesium level was normal, but did not remember if they told him about his kidney function studies, he said.   "

## 2020-07-09 NOTE — TELEPHONE ENCOUNTER
----- Message from Jimena Durand sent at 7/9/2020 12:06 PM CDT -----  Regarding: PT  Contact: PT  PT called to speak to his coordinator Helen Calzada regarding his medication Veltass. He went to the hospital yesterday and the doctor put him on a third packet of it and he wants to make sure that its ok for him to take it. Please call back     Callback: [phone]

## 2020-07-10 ENCOUNTER — TELEPHONE (OUTPATIENT)
Dept: TRANSPLANT | Facility: CLINIC | Age: 65
End: 2020-07-10

## 2020-07-10 NOTE — TELEPHONE ENCOUNTER
----- Message from Miriam García MD sent at 7/10/2020  3:15 PM CDT -----  Agree with ER visit.  Not sure why kidney function and potassium muscle elevated.  Need to make sure the patient is not taking any diuretics or clarify the dosing.  Repeat labs next week.

## 2020-07-15 ENCOUNTER — TELEPHONE (OUTPATIENT)
Dept: TRANSPLANT | Facility: CLINIC | Age: 65
End: 2020-07-15

## 2020-07-15 ENCOUNTER — HOSPITAL ENCOUNTER (OUTPATIENT)
Dept: RADIOLOGY | Facility: HOSPITAL | Age: 65
Discharge: HOME | End: 2020-07-15
Attending: INTERNAL MEDICINE
Payer: MEDICARE

## 2020-07-15 ENCOUNTER — OFFICE VISIT (OUTPATIENT)
Dept: GASTROENTEROLOGY | Facility: CLINIC | Age: 65
End: 2020-07-15
Payer: MEDICARE

## 2020-07-15 VITALS
DIASTOLIC BLOOD PRESSURE: 84 MMHG | BODY MASS INDEX: 32.29 KG/M2 | SYSTOLIC BLOOD PRESSURE: 120 MMHG | HEIGHT: 74 IN | WEIGHT: 251.56 LBS | HEART RATE: 92 BPM

## 2020-07-15 DIAGNOSIS — Z94.4 LIVER TRANSPLANTED: ICD-10-CM

## 2020-07-15 DIAGNOSIS — D84.9 IMMUNOSUPPRESSION: ICD-10-CM

## 2020-07-15 DIAGNOSIS — E87.5 HYPERKALEMIA: Primary | ICD-10-CM

## 2020-07-15 DIAGNOSIS — R06.02 SHORTNESS OF BREATH: ICD-10-CM

## 2020-07-15 DIAGNOSIS — R25.1 OCCASIONAL TREMORS: ICD-10-CM

## 2020-07-15 PROCEDURE — 71046 X-RAY EXAM CHEST 2 VIEWS: CPT | Mod: TC

## 2020-07-15 PROCEDURE — 71046 XR CHEST PA AND LATERAL: ICD-10-PCS | Mod: 26,,, | Performed by: RADIOLOGY

## 2020-07-15 PROCEDURE — 71046 X-RAY EXAM CHEST 2 VIEWS: CPT | Mod: 26,,, | Performed by: RADIOLOGY

## 2020-07-15 PROCEDURE — 99214 OFFICE O/P EST MOD 30 MIN: CPT | Mod: PBBFAC,25 | Performed by: INTERNAL MEDICINE

## 2020-07-15 PROCEDURE — 99999 PR PBB SHADOW E&M-EST. PATIENT-LVL IV: CPT | Mod: PBBFAC,,, | Performed by: INTERNAL MEDICINE

## 2020-07-15 PROCEDURE — 99213 PR OFFICE/OUTPT VISIT, EST, LEVL III, 20-29 MIN: ICD-10-PCS | Mod: S$PBB,,, | Performed by: INTERNAL MEDICINE

## 2020-07-15 PROCEDURE — 99999 PR PBB SHADOW E&M-EST. PATIENT-LVL IV: ICD-10-PCS | Mod: PBBFAC,,, | Performed by: INTERNAL MEDICINE

## 2020-07-15 PROCEDURE — 99213 OFFICE O/P EST LOW 20 MIN: CPT | Mod: S$PBB,,, | Performed by: INTERNAL MEDICINE

## 2020-07-15 RX ORDER — ASPIRIN 325 MG
325 TABLET ORAL DAILY
COMMUNITY
End: 2020-07-15 | Stop reason: DRUGHIGH

## 2020-07-15 RX ORDER — SULFAMETHOXAZOLE AND TRIMETHOPRIM 800; 160 MG/1; MG/1
TABLET ORAL 2 TIMES DAILY
COMMUNITY
Start: 2020-06-29 | End: 2020-07-15 | Stop reason: ALTCHOICE

## 2020-07-15 RX ORDER — SODIUM POLYSTYRENE SULFONATE 15 G/60ML
SUSPENSION ORAL; RECTAL ONCE
Qty: 60 ML | Refills: 1 | Status: SHIPPED | OUTPATIENT
Start: 2020-07-15 | End: 2020-07-16

## 2020-07-15 NOTE — TELEPHONE ENCOUNTER
Send pharmDs recommendation to Dr. García for review.    ----- Message from Yanet Atkins PharmD sent at 7/15/2020  9:33 AM CDT -----  We can see what his tac level is today, but his last high potassium on 5/18 didn't really correlate with an elevated tac level where I would expect to see hyperkalemia.  He has room to go up on the Veltassa, Max dose= 25.2 g/day.  I would try that prior to switching immunosuppression.  Does he have a high-K diet?  Could he tolerate a low dose of daily Lasix to help keep the potassium down?    ----- Message -----  From: Nickie Miranda RN  Sent: 7/15/2020   9:27 AM CDT  To: Abdominal Transplant Pharmacists    Continued issue with hyperkalemia despite being on Veltassa 16.8g daily. Dr. García is seeing him today and asking for recommendations on what else to do.    She wanted to know if she should switch him to cyclo?    Nickie

## 2020-07-15 NOTE — TELEPHONE ENCOUNTER
Critical lab received on pt. Pt's K is 6.4.    Discussed with Dr. García. She is seeing pt in clinic today and will address with pt.

## 2020-07-15 NOTE — PROGRESS NOTES
Transplant Hepatology  Liver Transplant Recipient Follow-up    Transplant Date: 7/23/2018  UNOS Native Liver Dx: Alcoholic Cirrhosis    Alon is here for follow up of his liver transplant.    ORGAN: LIVER  Whole or Partial: whole liver  Donor Type: donation after brain death  CDC High Risk: no  Donor CMV Status: Negative  Donor HCV Status: Negative  Donor HBcAb: Negative  Biliary Anastomosis: end to end  Arterial Anatomy: standard  IVC reconstruction: end to end ivc  Portal vein status: partial patent  .    Subjective:     Interval History:  Currently, he is doing adequately. Current complaints include shortness of breath.  He reports this has worsened over the last few weeks.  He has increasing dyspnea on exertion.  He is not taking any diuretics currently.  Of note he was recently evaluated for cellulitis and started on 2 antibiotics 1 of which was Bactrim.  He does continue to have significant issues with hyperkalemia.  He reports he is taking his veltassa and was increased to 3 packets a day.    Also with issues occasional tremors.  Seems to worsen while eating.    Review of Systems   Constitutional: Positive for activity change.   HENT: Negative.    Eyes: Negative.    Respiratory: Positive for shortness of breath.    Cardiovascular: Positive for leg swelling.   Gastrointestinal: Negative.    Genitourinary: Negative.    Musculoskeletal: Negative.    Skin: Positive for color change.   Neurological: Negative.    Psychiatric/Behavioral: Negative.        Objective:     Physical Exam  Vitals signs reviewed.   Constitutional:       General: He is not in acute distress.     Appearance: He is well-developed.   HENT:      Head:      Comments: Poor dentition     Mouth/Throat:      Pharynx: No oropharyngeal exudate.   Eyes:      General: No scleral icterus.        Right eye: No discharge.         Left eye: No discharge.      Conjunctiva/sclera: Conjunctivae normal.      Pupils: Pupils are equal, round, and reactive to  light.   Pulmonary:      Effort: Pulmonary effort is normal. No respiratory distress.      Breath sounds: Normal breath sounds. No wheezing.   Abdominal:      General: There is no distension.      Palpations: Abdomen is soft.      Tenderness: There is no abdominal tenderness.   Neurological:      Mental Status: He is alert and oriented to person, place, and time.   Psychiatric:         Behavior: Behavior normal.         WBC   Date Value Ref Range Status   07/15/2020 5.46 3.90 - 12.70 K/uL Final     Hemoglobin   Date Value Ref Range Status   07/15/2020 11.4 (L) 14.0 - 18.0 g/dL Final     POC Hematocrit   Date Value Ref Range Status   07/23/2018 25 (L) 36 - 54 %PCV Final     Hematocrit   Date Value Ref Range Status   07/15/2020 37.1 (L) 40.0 - 54.0 % Final     Platelets   Date Value Ref Range Status   07/15/2020 179 150 - 350 K/uL Final     BUN, Bld   Date Value Ref Range Status   07/15/2020 45 (H) 8 - 23 mg/dL Final     Creatinine   Date Value Ref Range Status   07/15/2020 2.0 (H) 0.5 - 1.4 mg/dL Final     Glucose   Date Value Ref Range Status   07/15/2020 120 (H) 70 - 110 mg/dL Final     Calcium   Date Value Ref Range Status   07/15/2020 10.0 8.7 - 10.5 mg/dL Final     Sodium   Date Value Ref Range Status   07/15/2020 135 (L) 136 - 145 mmol/L Final     Potassium   Date Value Ref Range Status   07/15/2020 6.0 (H) 3.5 - 5.1 mmol/L Final     Chloride   Date Value Ref Range Status   07/15/2020 109 95 - 110 mmol/L Final     Magnesium   Date Value Ref Range Status   07/15/2020 2.3 1.6 - 2.6 mg/dL Final     AST   Date Value Ref Range Status   07/15/2020 10 10 - 40 U/L Final     ALT   Date Value Ref Range Status   07/15/2020 9 (L) 10 - 44 U/L Final     Alkaline Phosphatase   Date Value Ref Range Status   07/15/2020 110 55 - 135 U/L Final     Total Bilirubin   Date Value Ref Range Status   07/15/2020 0.4 0.1 - 1.0 mg/dL Final     Comment:     For infants and newborns, interpretation of results should be based  on gestational  age, weight and in agreement with clinical  observations.  Premature Infant recommended reference ranges:  Up to 24 hours.............<8.0 mg/dL  Up to 48 hours............<12.0 mg/dL  3-5 days..................<15.0 mg/dL  6-29 days.................<15.0 mg/dL       Albumin   Date Value Ref Range Status   07/15/2020 3.8 3.5 - 5.2 g/dL Final     INR   Date Value Ref Range Status   07/31/2018 1.2 0.8 - 1.2 Final     Comment:     Coumadin Therapy:  2.0 - 3.0 for INR for all indicators except mechanical heart valves  and antiphospholipid syndromes which should use 2.5 - 3.5.       Lab Results   Component Value Date    TACROLIMUS 5.1 09/27/2018           Assessment/Plan:     1. Hyperkalemia    2. Immunosuppression    3. Liver transplanted    4. Shortness of breath    5. Occasional tremors      Hyperkalemia-may have been worsened by Bactrim but also suspect the patient is very sensitive to tacrolimus.  Prescription sent for Kayexalate for patient to take now and will get repeat potassium later today.  -     POTASSIUM; Future; Expected date: 07/15/2020  -     patiromer calcium sorbitex (VELTASSA) 8.4 gram PwPk; Take 3 packets (25.2 g total) by mouth once daily.  Dispense: 90 packet; Refill: 3    Immunosuppression  -awaiting immunosuppression level, will likely need to decrease tacrolimus level and support with CellCept    Liver transplanted-good allograft function    Shortness of breath-will evaluate further  -     X-Ray Chest PA And Lateral; Future; Expected date: 07/15/2020    Occasional tremors-suspect related to tacrolimus    Other orders  -     sodium polystyrene sulf-sorbtL (SPS, WITH SORBITOL,) 15-20 gram/60 mL Susp; Take 60 mLs (15 g total) by mouth once for 1 dose  Dispense: 60 mL; Refill: 1      Return to clinic in 6 months    Miriam García MD        Patient was seen in the liver transplant department at [facility].    OS Patient Status  Functional Status: 70% - Cares for self: unable to carry  on normal activity or active work  Physical Capacity: Limited Mobility

## 2020-07-16 ENCOUNTER — TELEPHONE (OUTPATIENT)
Dept: TRANSPLANT | Facility: CLINIC | Age: 65
End: 2020-07-16

## 2020-07-16 DIAGNOSIS — J90 PLEURAL EFFUSION: ICD-10-CM

## 2020-07-16 DIAGNOSIS — Z94.4 LIVER REPLACED BY TRANSPLANT: Primary | ICD-10-CM

## 2020-07-16 DIAGNOSIS — R06.02 SOB (SHORTNESS OF BREATH): ICD-10-CM

## 2020-07-16 NOTE — TELEPHONE ENCOUNTER
Returned call. Spoke with pt. States Dr. García told him he needs to see pulmonology. Advised pt that Dr. García would like him to have a chest CT first but we need to see the results of his repeat labs before we can order and schedule. He agreed with plan.     ----- Message from Lobo Medina sent at 7/16/2020  8:19 AM CDT -----  Pt calling to schedule an appt ASAP    [phone]

## 2020-07-16 NOTE — TELEPHONE ENCOUNTER
----- Message from Miriam García MD sent at 7/15/2020  4:17 PM CDT -----  Repeat potassium is still 6.0 but it is improved.  Recommend patient proceed with taking his regularly scheduled Veltass tonight.  Repeat blood work tomorrow.

## 2020-07-16 NOTE — TELEPHONE ENCOUNTER
Repeat labs pending from today.   ----- Message from Miriam García MD sent at 7/15/2020  4:19 PM CDT -----  Potassium elevated.  Prescription for Kayexalate sent to pharmacy patient is supposed to take it today and get repeat potassium checked.  Creatinine is also elevated.  Liver tests remain normal.  Repeat full set of labs tomorrow.

## 2020-07-17 ENCOUNTER — TELEPHONE (OUTPATIENT)
Dept: TRANSPLANT | Facility: CLINIC | Age: 65
End: 2020-07-17

## 2020-07-17 NOTE — TELEPHONE ENCOUNTER
Patient notified and instructed to stop the Magnesium and repeat labs next week, he will go on 7/21, he said.

## 2020-07-17 NOTE — TELEPHONE ENCOUNTER
----- Message from Miriam García MD sent at 7/11/2020  2:07 PM CDT -----  Yes stop magnesium.  ----- Message -----  From: Helen Calzada  Sent: 7/10/2020   3:35 PM CDT  To: Miriam García MD    You want him to stop the magnesium right?    ----- Message -----  From: Miriam García MD  Sent: 7/10/2020   3:15 PM CDT  To: [facility]    Agree with ER visit.  Not sure why kidney function and potassium muscle elevated.  Need to make sure the patient is not taking any diuretics or clarify the dosing.  Repeat labs next week.

## 2020-07-21 ENCOUNTER — TELEPHONE (OUTPATIENT)
Dept: PULMONOLOGY | Facility: CLINIC | Age: 65
End: 2020-07-21

## 2020-07-21 LAB
EXT ALBUMIN: 3.3
EXT ALKALINE PHOSPHATASE: 105
EXT ALT: 14
EXT AST: 8
EXT BILIRUBIN TOTAL: 0.3
EXT BUN: 41
EXT CALCIUM: 9.1
EXT CHLORIDE: 113
EXT CO2: 18
EXT CREATININE: 1.4 MG/DL
EXT GFR MDRD NON AF AMER: 51
EXT GLUCOSE: 124
EXT HEMATOCRIT: 32
EXT HEMOGLOBIN: 10.5
EXT MAGNESIUM: 1.9
EXT PLATELETS: 161
EXT POTASSIUM: 4.7
EXT PROTEIN TOTAL: 7.2
EXT SODIUM: 137 MMOL/L
EXT TACROLIMUS LVL: 3.6
EXT TACROLIMUS LVL: 4.5
EXT WBC: 6

## 2020-07-21 NOTE — TELEPHONE ENCOUNTER
----- Message from Lizy Slater LPN sent at 7/21/2020 11:50 AM CDT -----  Regarding: FW: appt access  Pt never seen in Pulmonary.  ----- Message -----  From: Beena Neville  Sent: 7/21/2020  11:02 AM CDT  [facility]  Subject: appt access                                      Type:  Sooner Apoointment Request    Caller is requesting a sooner appointment.  Caller declined first available appointment listed below.  Caller will not accept being placed on the waitlist and is requesting a message be sent to doctor.  Name of Caller: Kristal  When is the first available appointment?n/a  Symptoms: shortness of breath, ect  Would the patient rather a call back or a response via [facility]? Call back   Best Call Back Number:[phone]  Additional Information: Please call back

## 2020-07-21 NOTE — TELEPHONE ENCOUNTER
Patient opted for the following appointment:    Future Appointments   Date Time Provider Department Center   7/29/2020  8:40 AM Augustine Rodriguez MD [facility] [place]   1/13/2021 10:00 AM Miriam García MD [facility]       Patient verbalized understanding of appointment date, time, and clinic location.

## 2020-07-27 ENCOUNTER — TELEPHONE (OUTPATIENT)
Dept: TRANSPLANT | Facility: CLINIC | Age: 65
End: 2020-07-27

## 2020-07-27 ENCOUNTER — TELEPHONE (OUTPATIENT)
Dept: PHARMACY | Facility: CLINIC | Age: 65
End: 2020-07-27

## 2020-07-27 ENCOUNTER — TELEPHONE (OUTPATIENT)
Dept: GASTROENTEROLOGY | Facility: CLINIC | Age: 65
End: 2020-07-27

## 2020-07-27 DIAGNOSIS — E87.5 HYPERKALEMIA: ICD-10-CM

## 2020-07-27 NOTE — TELEPHONE ENCOUNTER
Patient notified and instructed to have repeat labs this week. He will go tomorrow he said.  Also informed patient that Rx. For Veltassa has been signed by . he was able to voice understanding.

## 2020-07-27 NOTE — TELEPHONE ENCOUNTER
----- Message from Miriam García MD sent at 7/27/2020 10:01 AM CDT -----  Tacrolimus level is lower than goal but liver tests remain okay.  Recommend repeating labs from monitoring this week.

## 2020-07-27 NOTE — TELEPHONE ENCOUNTER
----- Message from Loulou Melchor LPN sent at 7/27/2020  8:33 AM CDT -----    ----- Message -----  From: Mamta Bella PharmD  Sent: 7/25/2020  11:42 AM CDT  To: [facility] Staff    Greetings Dr. García and staff,     Mr. Adamson is due for a refill of his Veltassa, a refill request has been sent on the patient's behalf. Also, since he is increasing to three 8.4g packets daily. If appropriate, can he be changed to the 25.2 g packet? Thank you for your time.         Sincerely,   [facility]   [phone]

## 2020-07-27 NOTE — TELEPHONE ENCOUNTER
Veltassa dosage change and refill completed: Patient confirms two patient identifiers - name + .    He confirms having Veltassa 8.4g - taking 3 packets QD, with about 6-7 days of medication remaining at this time. He is advised that the new Rx is for Veltassa 25.2g packets - He is to take 1 packet (25.2g) po QD. Patient denies any missed doses, side effects, or visits to the ER/Urgent care. He also confirms no other changes to allergies, health conditions, medications or insurance. OSP to SHIP Veltassa on 20 to arrive at his home on 20 via [employer]. Address confirmed. $99.30 copay ( he is aware that he exhausted his [employer] margot and needs to meet a certain OOP to request PAP - [facility]). Patient has no further questions or concerns. OSP will continue to reach out monthly for refills. TTN

## 2020-07-28 LAB
EXT ALBUMIN: 3.2
EXT ALKALINE PHOSPHATASE: 95
EXT ALT: 11
EXT AST: 3
EXT BILIRUBIN TOTAL: 0.2
EXT BUN: 44
EXT CALCIUM: 8.6
EXT CHLORIDE: 115
EXT CO2: 21
EXT CREATININE: 1.5 MG/DL
EXT EOSINOPHIL%: 2.9
EXT GFR MDRD NON AF AMER: 47
EXT GLUCOSE: 121
EXT HEMATOCRIT: 32
EXT HEMOGLOBIN: 10.5
EXT LYMPH%: 10.8
EXT MONOCYTES%: 11
EXT PLATELETS: 147
EXT POTASSIUM: 4.5
EXT PROTEIN TOTAL: 6.7
EXT SEGS%: 74.2
EXT SODIUM: 141 MMOL/L
EXT TACROLIMUS LVL: 4.7
EXT WBC: 4.9

## 2020-07-29 ENCOUNTER — HOSPITAL ENCOUNTER (OUTPATIENT)
Dept: RADIOLOGY | Facility: HOSPITAL | Age: 65
Discharge: HOME | End: 2020-07-29
Attending: INTERNAL MEDICINE
Payer: MEDICARE

## 2020-07-29 ENCOUNTER — OFFICE VISIT (OUTPATIENT)
Dept: PULMONOLOGY | Facility: CLINIC | Age: 65
End: 2020-07-29
Payer: MEDICARE

## 2020-07-29 VITALS
SYSTOLIC BLOOD PRESSURE: 118 MMHG | WEIGHT: 256.19 LBS | OXYGEN SATURATION: 99 % | RESPIRATION RATE: 17 BRPM | HEIGHT: 74 IN | TEMPERATURE: 98 F | HEART RATE: 91 BPM | DIASTOLIC BLOOD PRESSURE: 76 MMHG | BODY MASS INDEX: 32.88 KG/M2

## 2020-07-29 DIAGNOSIS — R06.02 SOB (SHORTNESS OF BREATH): ICD-10-CM

## 2020-07-29 DIAGNOSIS — Z23 NEED FOR VACCINATION WITH 13-POLYVALENT PNEUMOCOCCAL CONJUGATE VACCINE: ICD-10-CM

## 2020-07-29 DIAGNOSIS — J90 PLEURAL EFFUSION: ICD-10-CM

## 2020-07-29 DIAGNOSIS — Z94.4 LIVER REPLACED BY TRANSPLANT: ICD-10-CM

## 2020-07-29 DIAGNOSIS — J90 PLEURAL EFFUSION ON LEFT: ICD-10-CM

## 2020-07-29 DIAGNOSIS — J90 PLEURAL EFFUSION ON LEFT: Primary | ICD-10-CM

## 2020-07-29 PROCEDURE — 76604 US EXAM CHEST: CPT | Mod: TC

## 2020-07-29 PROCEDURE — 76604 US CHEST MEDIASTINUM: ICD-10-PCS | Mod: 26,,, | Performed by: RADIOLOGY

## 2020-07-29 PROCEDURE — 99214 OFFICE O/P EST MOD 30 MIN: CPT | Mod: PBBFAC | Performed by: INTERNAL MEDICINE

## 2020-07-29 PROCEDURE — 99999 PR PBB SHADOW E&M-EST. PATIENT-LVL IV: ICD-10-PCS | Mod: PBBFAC,,, | Performed by: INTERNAL MEDICINE

## 2020-07-29 PROCEDURE — G0009 ADMIN PNEUMOCOCCAL VACCINE: HCPCS | Mod: PBBFAC

## 2020-07-29 PROCEDURE — 76604 US EXAM CHEST: CPT | Mod: 26,,, | Performed by: RADIOLOGY

## 2020-07-29 PROCEDURE — 99215 PR OFFICE/OUTPT VISIT, EST, LEVL V, 40-54 MIN: ICD-10-PCS | Mod: S$PBB,,, | Performed by: INTERNAL MEDICINE

## 2020-07-29 PROCEDURE — 99999 PR PBB SHADOW E&M-EST. PATIENT-LVL IV: CPT | Mod: PBBFAC,,, | Performed by: INTERNAL MEDICINE

## 2020-07-29 PROCEDURE — 99215 OFFICE O/P EST HI 40 MIN: CPT | Mod: S$PBB,,, | Performed by: INTERNAL MEDICINE

## 2020-07-29 RX ORDER — ASPIRIN 81 MG/1
81 TABLET ORAL DAILY
Status: ON HOLD | COMMUNITY
Start: 2018-07-23 | End: 2022-01-01

## 2020-07-29 NOTE — PROGRESS NOTES
Initial Outpatient Pulmonary Evaluation       SUBJECTIVE:     Chief Complaint   Patient presents with    Pleural Effusion       History of Present Illness:    Patient is a 65 y.o. male referred for evaluation of worsening shortness of breath, and dry cough for months and fatigue.    Has history of liver transplant 2018 for ETOH liver cirrhosis, on tacrolimus, recently was evaluated for hyperkalemia probably precipitated by Bactrim therapy for lower extremity cellulitis.    Chest x-ray shows left-sided opacities/pleural effusion.      Tobacco dip her quit 2018.    Used to work in the [employer] department in [place].          Review of Systems   Constitutional: Positive for fatigue and weakness. Negative for fever and chills.   HENT: Negative for nosebleeds.    Eyes: Negative for redness.   Respiratory: Positive for cough and shortness of breath. Negative for choking.    Cardiovascular: Negative for chest pain.   Genitourinary: Negative for hematuria.   Endocrine: ?   Diabetes mellitus Negative for cold intolerance.    Musculoskeletal: Positive for arthralgias, back pain and gait problem.   Skin: Positive for rash.        Recently treated for LE cellulitis w Bactrim    Gastrointestinal: Negative for vomiting.        Cirrhosis status post liver transplant.   Neurological: Negative for syncope.        History of seizures   Hematological: Negative for adenopathy.        Immunocompromised.   Psychiatric/Behavioral: Negative for confusion.       Review of patient's allergies indicates:   Allergen Reactions    Nsaids (non-steroidal anti-inflammatory drug)      D/t to liver disease.    Penicillins Other (See Comments)     Tolerated pip-tazo in 8/2016 without issue  Tolerated cefepime 7/2018 without issue  Since childhood was told not to take it       Current Outpatient Medications   Medication Sig Dispense Refill    aspirin (ASPIRIN LOW DOSE) 81 MG EC tablet        levETIRAcetam (KEPPRA) 500 MG Tab Take 1 tablet (500 mg total) by mouth 2 (two) times daily. 60 tablet 1    patiromer calcium sorbitex (VELTASSA) 25.2 gram PwPk Take 1 packet (25.2 g total) by mouth once daily. 30 packet 2    tacrolimus (PROGRAF) 1 MG Cap Take 2 capsules (2 mg total) by mouth every morning AND 1 capsule (1 mg total) every evening. 90 capsule 11     No current facility-administered medications for this visit.        Past Medical History:   Diagnosis Date    Allergy     Anticoagulant long-term use     Arthritis     Back pain     Cellulitis     Cirrhosis     Coronary artery disease     DM (diabetes mellitus)     Hearing loss     right ear    Hepatic encephalopathy     Hypertension     diagnosed today (11/25/2015) and prescribed toprol    Leg edema     Nephrolithiasis     JACK (obstructive sleep apnea)     Seizures     Splenomegaly      Past Surgical History:   Procedure Laterality Date    APPENDECTOMY      Open    BACK SURGERY      CHOLECYSTECTOMY      laprascopic    COLONOSCOPY N/A 1/25/2018    Procedure: COLONOSCOPY;  Surgeon: Lashawn Myles MD;  [facility]  Service: Endoscopy;  Laterality: N/A;    LIVER TRANSPLANT N/A 7/23/2018    Procedure: TRANSPLANT, LIVER;  Surgeon: Alma Joyce MD;  [facility];  Service: Transplant;  Laterality: N/A;    LUMBAR DISCECTOMY      SPLENIC ARTERY EMBOLIZATION  04/08/2016    Dr Taveras    TONSILLECTOMY       Family History   Problem Relation Age of Onset    Melanoma Mother     Heart attack Neg Hx     Heart disease Neg Hx     Hypertension Neg Hx      Social History     Tobacco Use    Smoking status: Never Smoker    Smokeless tobacco: Former User     Types: Chew   Substance Use Topics    Alcohol use: No     Alcohol/week: 0.0 standard drinks    Drug use: No          OBJECTIVE:     Vital Signs (Most Recent)  Vital Signs  Temp: 97.7 °F (36.5 °C)  Pulse: 91  Resp: 17  SpO2: 99 %  BP: 118/76  Height  "and Weight  Height: 6' 2" (188 cm)  Weight: 116.2 kg (256 lb 2.8 oz)  BSA (Calculated - sq m): 2.46 sq meters  BMI (Calculated): 32.9  Weight in (lb) to have BMI = 25: 194.3]  Wt Readings from Last 2 Encounters:   07/29/20 116.2 kg (256 lb 2.8 oz)   07/15/20 114.1 kg (251 lb 8.7 oz)         Physical Exam:  Physical Exam   Constitutional: He is oriented to person, place, and time. He appears well-developed and well-nourished.   HENT:   Head: Normocephalic.   Neck: Neck supple.   Cardiovascular: Normal rate, regular rhythm and normal heart sounds.   Pulmonary/Chest: Normal expansion and effort normal. No stridor. No respiratory distress. He exhibits no tenderness.   Abdominal: Soft.   Musculoskeletal:         General: No tenderness or deformity.   Lymphadenopathy:     He has no cervical adenopathy.   Neurological: He is alert and oriented to person, place, and time. Gait normal.   Skin: Skin is warm. No cyanosis. Nails show no clubbing.   Psychiatric: He has a normal mood and affect. His behavior is normal. Judgment and thought content normal.   Nursing note and vitals reviewed.      Laboratory  Lab Results   Component Value Date    WBC 5.46 07/15/2020    RBC 4.50 (L) 07/15/2020    HGB 11.4 (L) 07/15/2020    HCT 37.1 (L) 07/15/2020    MCV 82 07/15/2020    MCH 25.3 (L) 07/15/2020    MCHC 30.7 (L) 07/15/2020    RDW 17.0 (H) 07/15/2020     07/15/2020    MPV 9.7 07/15/2020    GRAN 3.8 07/15/2020    GRAN 69.6 07/15/2020    LYMPH 0.8 (L) 07/15/2020    LYMPH 13.9 (L) 07/15/2020    MONO 0.7 07/15/2020    MONO 13.2 07/15/2020    EOS 0.1 07/15/2020    BASO 0.04 07/15/2020    EOSINOPHIL 2.6 07/15/2020    BASOPHIL 0.7 07/15/2020       BMP  Lab Results   Component Value Date     (L) 07/15/2020    K 6.0 (H) 07/15/2020     07/15/2020    CO2 19 (L) 07/15/2020    BUN 45 (H) 07/15/2020    CREATININE 2.0 (H) 07/15/2020    CALCIUM 10.0 07/15/2020    ANIONGAP 7 (L) 07/15/2020    ESTGFRAFRICA 40 (A) 07/15/2020    " EGFRNONAA 34 (A) 07/15/2020    AST 10 07/15/2020    ALT 9 (L) 07/15/2020    PROT 8.1 07/15/2020       Lab Results   Component Value Date    BNP 88 01/20/2018       Lab Results   Component Value Date    TSH 5.556 (H) 08/17/2018       No results found for: SEDRATE    No results found for: CRP    No results found for: IGE    No results found for: ASPERGILLUS  No results found for: AFUMIGATUSCL     No results found for: ACE    Diagnostic Results:  I have personally reviewed today the following studies :    Chest x-ray 07/15/2020    There is a large opacities seen within the left lung base that appears to be contiguous with the pleural space along the medial and lateral aspect of the left hemithorax and near the left lung apex.  Findings are consistent with a large left-sided pleural effusion that is primarily subpulmonic in location.  The heart is not enlarged. There appear to be some vascular coils projecting over the upper abdomen best seen on the lateral film.      2D echo 2018    1 - Normal left ventricular systolic function (EF 55-60%).     2 - Normal left ventricular diastolic function.     3 - Normal right ventricular systolic function .     4 - Trivial mitral regurgitation.     5 - Trivial tricuspid regurgitation.     6 - IVC not well seen. However, most likely based on other findings central venous pressures are not elevated.      ASSESSMENT/PLAN:     Pleural effusion on left  -     Brain Natriuretic Peptide; Future; Expected date: 07/29/2020  -     Quantiferon Gold TB; Future; Expected date: 07/29/2020  -     US Chest Mediastinum; Future; Expected date: 07/29/2020    Liver replaced by transplant  -     Ambulatory consult to Pulmonology    SOB (shortness of breath)  -     Ambulatory consult to Pulmonology  -     Brain Natriuretic Peptide; Future; Expected date: 07/29/2020  -     Quantiferon Gold TB; Future; Expected date: 07/29/2020  -     US Chest Mediastinum; Future; Expected date: 07/29/2020    Pleural  effusion  -     Ambulatory consult to Pulmonology  -     Brain Natriuretic Peptide; Future; Expected date: 07/29/2020  -     Quantiferon Gold TB; Future; Expected date: 07/29/2020  -     US Chest Mediastinum; Future; Expected date: 07/29/2020    Need for vaccination with 13-polyvalent pneumococcal conjugate vaccine  -     Pneumococcal Conjugate Vaccine (13 Valent) (IM)      Left pleural effusion etiology likely hydrothorax/liver related versus infectious versus malignancy.    Will proceed with ultrasound of the chest  prior to scheduling patient for elective thoracentesis, will send fluid for chemistry microbiology and cytology.        Follow up in about 3 weeks (around 8/19/2020).    This note was prepared using voice recognition system and is likely to have sound alike errors that may have been overlooked even after proof reading.  Please call me with any questions    Discussed diagnosis, its evaluation, treatment and usual course. All questions answered.    Thank you for the courtesy of participating in the care of this patient    Augustine Rodriguez MD

## 2020-07-29 NOTE — H&P (VIEW-ONLY)
Initial Outpatient Pulmonary Evaluation       SUBJECTIVE:     Chief Complaint   Patient presents with    Pleural Effusion       History of Present Illness:    Patient is a 65 y.o. male referred for evaluation of worsening shortness of breath, and dry cough for months and fatigue.    Has history of liver transplant 2018 for ETOH liver cirrhosis, on tacrolimus, recently was evaluated for hyperkalemia probably precipitated by Bactrim therapy for lower extremity cellulitis.    Chest x-ray shows left-sided opacities/pleural effusion.      Tobacco dip her quit 2018.    Used to work in the [employer] department in [place].          Review of Systems   Constitutional: Positive for fatigue and weakness. Negative for fever and chills.   HENT: Negative for nosebleeds.    Eyes: Negative for redness.   Respiratory: Positive for cough and shortness of breath. Negative for choking.    Cardiovascular: Negative for chest pain.   Genitourinary: Negative for hematuria.   Endocrine: ?   Diabetes mellitus Negative for cold intolerance.    Musculoskeletal: Positive for arthralgias, back pain and gait problem.   Skin: Positive for rash.        Recently treated for LE cellulitis w Bactrim    Gastrointestinal: Negative for vomiting.        Cirrhosis status post liver transplant.   Neurological: Negative for syncope.        History of seizures   Hematological: Negative for adenopathy.        Immunocompromised.   Psychiatric/Behavioral: Negative for confusion.       Review of patient's allergies indicates:   Allergen Reactions    Nsaids (non-steroidal anti-inflammatory drug)      D/t to liver disease.    Penicillins Other (See Comments)     Tolerated pip-tazo in 8/2016 without issue  Tolerated cefepime 7/2018 without issue  Since childhood was told not to take it       Current Outpatient Medications   Medication Sig Dispense Refill    aspirin (ASPIRIN LOW DOSE) 81 MG EC tablet        levETIRAcetam (KEPPRA) 500 MG Tab Take 1 tablet (500 mg total) by mouth 2 (two) times daily. 60 tablet 1    patiromer calcium sorbitex (VELTASSA) 25.2 gram PwPk Take 1 packet (25.2 g total) by mouth once daily. 30 packet 2    tacrolimus (PROGRAF) 1 MG Cap Take 2 capsules (2 mg total) by mouth every morning AND 1 capsule (1 mg total) every evening. 90 capsule 11     No current facility-administered medications for this visit.        Past Medical History:   Diagnosis Date    Allergy     Anticoagulant long-term use     Arthritis     Back pain     Cellulitis     Cirrhosis     Coronary artery disease     DM (diabetes mellitus)     Hearing loss     right ear    Hepatic encephalopathy     Hypertension     diagnosed today (11/25/2015) and prescribed toprol    Leg edema     Nephrolithiasis     JACK (obstructive sleep apnea)     Seizures     Splenomegaly      Past Surgical History:   Procedure Laterality Date    APPENDECTOMY      Open    BACK SURGERY      CHOLECYSTECTOMY      laprascopic    COLONOSCOPY N/A 1/25/2018    Procedure: COLONOSCOPY;  Surgeon: Lashawn Myles MD;  [facility]  Service: Endoscopy;  Laterality: N/A;    LIVER TRANSPLANT N/A 7/23/2018    Procedure: TRANSPLANT, LIVER;  Surgeon: Alma Joyce MD;  [facility];  Service: Transplant;  Laterality: N/A;    LUMBAR DISCECTOMY      SPLENIC ARTERY EMBOLIZATION  04/08/2016    Dr Taveras    TONSILLECTOMY       Family History   Problem Relation Age of Onset    Melanoma Mother     Heart attack Neg Hx     Heart disease Neg Hx     Hypertension Neg Hx      Social History     Tobacco Use    Smoking status: Never Smoker    Smokeless tobacco: Former User     Types: Chew   Substance Use Topics    Alcohol use: No     Alcohol/week: 0.0 standard drinks    Drug use: No          OBJECTIVE:     Vital Signs (Most Recent)  Vital Signs  Temp: 97.7 °F (36.5 °C)  Pulse: 91  Resp: 17  SpO2: 99 %  BP: 118/76  Height  "and Weight  Height: 6' 2" (188 cm)  Weight: 116.2 kg (256 lb 2.8 oz)  BSA (Calculated - sq m): 2.46 sq meters  BMI (Calculated): 32.9  Weight in (lb) to have BMI = 25: 194.3]  Wt Readings from Last 2 Encounters:   07/29/20 116.2 kg (256 lb 2.8 oz)   07/15/20 114.1 kg (251 lb 8.7 oz)         Physical Exam:  Physical Exam   Constitutional: He is oriented to person, place, and time. He appears well-developed and well-nourished.   HENT:   Head: Normocephalic.   Neck: Neck supple.   Cardiovascular: Normal rate, regular rhythm and normal heart sounds.   Pulmonary/Chest: Normal expansion and effort normal. No stridor. No respiratory distress. He exhibits no tenderness.   Abdominal: Soft.   Musculoskeletal:         General: No tenderness or deformity.   Lymphadenopathy:     He has no cervical adenopathy.   Neurological: He is alert and oriented to person, place, and time. Gait normal.   Skin: Skin is warm. No cyanosis. Nails show no clubbing.   Psychiatric: He has a normal mood and affect. His behavior is normal. Judgment and thought content normal.   Nursing note and vitals reviewed.      Laboratory  Lab Results   Component Value Date    WBC 5.46 07/15/2020    RBC 4.50 (L) 07/15/2020    HGB 11.4 (L) 07/15/2020    HCT 37.1 (L) 07/15/2020    MCV 82 07/15/2020    MCH 25.3 (L) 07/15/2020    MCHC 30.7 (L) 07/15/2020    RDW 17.0 (H) 07/15/2020     07/15/2020    MPV 9.7 07/15/2020    GRAN 3.8 07/15/2020    GRAN 69.6 07/15/2020    LYMPH 0.8 (L) 07/15/2020    LYMPH 13.9 (L) 07/15/2020    MONO 0.7 07/15/2020    MONO 13.2 07/15/2020    EOS 0.1 07/15/2020    BASO 0.04 07/15/2020    EOSINOPHIL 2.6 07/15/2020    BASOPHIL 0.7 07/15/2020       BMP  Lab Results   Component Value Date     (L) 07/15/2020    K 6.0 (H) 07/15/2020     07/15/2020    CO2 19 (L) 07/15/2020    BUN 45 (H) 07/15/2020    CREATININE 2.0 (H) 07/15/2020    CALCIUM 10.0 07/15/2020    ANIONGAP 7 (L) 07/15/2020    ESTGFRAFRICA 40 (A) 07/15/2020    " EGFRNONAA 34 (A) 07/15/2020    AST 10 07/15/2020    ALT 9 (L) 07/15/2020    PROT 8.1 07/15/2020       Lab Results   Component Value Date    BNP 88 01/20/2018       Lab Results   Component Value Date    TSH 5.556 (H) 08/17/2018       No results found for: SEDRATE    No results found for: CRP    No results found for: IGE    No results found for: ASPERGILLUS  No results found for: AFUMIGATUSCL     No results found for: ACE    Diagnostic Results:  I have personally reviewed today the following studies :    Chest x-ray 07/15/2020    There is a large opacities seen within the left lung base that appears to be contiguous with the pleural space along the medial and lateral aspect of the left hemithorax and near the left lung apex.  Findings are consistent with a large left-sided pleural effusion that is primarily subpulmonic in location.  The heart is not enlarged. There appear to be some vascular coils projecting over the upper abdomen best seen on the lateral film.      2D echo 2018    1 - Normal left ventricular systolic function (EF 55-60%).     2 - Normal left ventricular diastolic function.     3 - Normal right ventricular systolic function .     4 - Trivial mitral regurgitation.     5 - Trivial tricuspid regurgitation.     6 - IVC not well seen. However, most likely based on other findings central venous pressures are not elevated.      ASSESSMENT/PLAN:     Pleural effusion on left  -     Brain Natriuretic Peptide; Future; Expected date: 07/29/2020  -     Quantiferon Gold TB; Future; Expected date: 07/29/2020  -     US Chest Mediastinum; Future; Expected date: 07/29/2020    Liver replaced by transplant  -     Ambulatory consult to Pulmonology    SOB (shortness of breath)  -     Ambulatory consult to Pulmonology  -     Brain Natriuretic Peptide; Future; Expected date: 07/29/2020  -     Quantiferon Gold TB; Future; Expected date: 07/29/2020  -     US Chest Mediastinum; Future; Expected date: 07/29/2020    Pleural  effusion  -     Ambulatory consult to Pulmonology  -     Brain Natriuretic Peptide; Future; Expected date: 07/29/2020  -     Quantiferon Gold TB; Future; Expected date: 07/29/2020  -     US Chest Mediastinum; Future; Expected date: 07/29/2020    Need for vaccination with 13-polyvalent pneumococcal conjugate vaccine  -     Pneumococcal Conjugate Vaccine (13 Valent) (IM)      Left pleural effusion etiology likely hydrothorax/liver related versus infectious versus malignancy.    Will proceed with ultrasound of the chest  prior to scheduling patient for elective thoracentesis, will send fluid for chemistry microbiology and cytology.        Follow up in about 3 weeks (around 8/19/2020).    This note was prepared using voice recognition system and is likely to have sound alike errors that may have been overlooked even after proof reading.  Please call me with any questions    Discussed diagnosis, its evaluation, treatment and usual course. All questions answered.    Thank you for the courtesy of participating in the care of this patient    Augustine Rodriguez MD

## 2020-07-29 NOTE — LETTER
July 29, 2020      Miriam García MD  [address] [facility] [address]           [facility]  [address] [facility] [address]  Phone: [phone]  Fax: [phone]          Patient: Alon Adamson   MR Number: [id number]   YOB: 1955   Date of Visit: 7/29/2020       Dear Dr. Miriam García:    Thank you for referring Alon Adamson to me for evaluation. Attached you will find relevant portions of my assessment and plan of care.    If you have questions, please do not hesitate to call me. I look forward to following Alon Adamson along with you.    Sincerely,    Augustine Rodriguez MD    Enclosure  CC:  No Recipients    If you would like to receive this communication electronically, please contact [email or web address] or [phone] to request more information on [facility] access.    For providers and/or their staff who would like to refer a patient to [facility], please contact us through our one-stop-shop provider referral line, [facility], at [phone].    If you feel you have received this communication in error or would no longer like to receive these types of communications, please e-mail [email or web address]

## 2020-07-30 DIAGNOSIS — J90 PLEURAL EFFUSION ON LEFT: Primary | ICD-10-CM

## 2020-07-30 RX ORDER — SODIUM CHLORIDE 0.9 % (FLUSH) 0.9 %
10 SYRINGE (ML) INJECTION
Status: CANCELLED | OUTPATIENT
Start: 2020-07-30

## 2020-08-03 ENCOUNTER — HOSPITAL ENCOUNTER (OUTPATIENT)
Facility: HOSPITAL | Age: 65
Discharge: HOME | End: 2020-08-03
Attending: INTERNAL MEDICINE | Admitting: INTERNAL MEDICINE
Payer: MEDICARE

## 2020-08-03 ENCOUNTER — TELEPHONE (OUTPATIENT)
Dept: PULMONOLOGY | Facility: CLINIC | Age: 65
End: 2020-08-03

## 2020-08-03 ENCOUNTER — TELEPHONE (OUTPATIENT)
Dept: TRANSPLANT | Facility: CLINIC | Age: 65
End: 2020-08-03

## 2020-08-03 DIAGNOSIS — J90 PLEURAL EFFUSION ON LEFT: ICD-10-CM

## 2020-08-03 LAB
APPEARANCE FLD: CLEAR
BODY FLD TYPE: NORMAL
COLOR FLD: YELLOW
LYMPHOCYTES NFR FLD MANUAL: 89 %
MONOS+MACROS NFR FLD MANUAL: 10 %
NEUTROPHILS NFR FLD MANUAL: 1 %
POCT GLUCOSE: 107 MG/DL (ref 70–110)
SARS-COV-2 RDRP RESP QL NAA+PROBE: NEGATIVE
WBC # FLD: 46 /CU MM

## 2020-08-03 PROCEDURE — 87206 SMEAR FLUORESCENT/ACID STAI: CPT

## 2020-08-03 PROCEDURE — 32555 PR THORACEN W/IMAG GUIDANCE: ICD-10-PCS | Mod: LT,,, | Performed by: INTERNAL MEDICINE

## 2020-08-03 PROCEDURE — 32555 ASPIRATE PLEURA W/ IMAGING: CPT | Mod: LT,,, | Performed by: INTERNAL MEDICINE

## 2020-08-03 PROCEDURE — 32555 ASPIRATE PLEURA W/ IMAGING: CPT | Mod: LT | Performed by: INTERNAL MEDICINE

## 2020-08-03 PROCEDURE — 82945 GLUCOSE OTHER FLUID: CPT

## 2020-08-03 PROCEDURE — U0002 COVID-19 LAB TEST NON-CDC: HCPCS

## 2020-08-03 PROCEDURE — 88112 CYTOPATH CELL ENHANCE TECH: CPT | Mod: 26,,, | Performed by: PATHOLOGY

## 2020-08-03 PROCEDURE — 88112 CYTOPATH CELL ENHANCE TECH: CPT | Performed by: PATHOLOGY

## 2020-08-03 PROCEDURE — 88305 TISSUE EXAM BY PATHOLOGIST: CPT | Performed by: PATHOLOGY

## 2020-08-03 PROCEDURE — 87116 MYCOBACTERIA CULTURE: CPT

## 2020-08-03 PROCEDURE — 88112 PR  CYTOPATH, CELL ENHANCE TECH: ICD-10-PCS | Mod: 26,,, | Performed by: PATHOLOGY

## 2020-08-03 PROCEDURE — 88305 TISSUE EXAM BY PATHOLOGIST: ICD-10-PCS | Mod: 26,,, | Performed by: PATHOLOGY

## 2020-08-03 PROCEDURE — 89051 BODY FLUID CELL COUNT: CPT

## 2020-08-03 PROCEDURE — 83615 LACTATE (LD) (LDH) ENZYME: CPT

## 2020-08-03 PROCEDURE — 88305 TISSUE EXAM BY PATHOLOGIST: CPT | Mod: 26,,, | Performed by: PATHOLOGY

## 2020-08-03 PROCEDURE — 87205 SMEAR GRAM STAIN: CPT

## 2020-08-03 PROCEDURE — 84157 ASSAY OF PROTEIN OTHER: CPT

## 2020-08-03 PROCEDURE — 87070 CULTURE OTHR SPECIMN AEROBIC: CPT

## 2020-08-03 RX ORDER — SODIUM CHLORIDE 0.9 % (FLUSH) 0.9 %
10 SYRINGE (ML) INJECTION
Status: DISCONTINUED | OUTPATIENT
Start: 2020-08-03 | End: 2020-08-03 | Stop reason: HOSPADM

## 2020-08-03 NOTE — BRIEF OP NOTE
[facility]  Thoracentesis  Procedure Note    SUMMARY     Date of Procedure: 08/03/2020     Procedure: Thoracentesis    Indications: Diagnostic    Pre-Operative Diagnosis: left pleural effusion     Post-Operative Diagnosis: same    Anesthesia: local    Technical Procedures Used: US guided needle thoracentesis     Description of the Findings of the Procedure: small left side effusion noted     Consent: Informed consent was obtained. Risks of the procedure were discussed including: infection, bleeding, pain, pneumothorax.    Under sterile conditions the patient was positioned. Chloraprep solution and sterile drapes were utilized. 10 cc 1% plain lidocaine was used to anesthetize between the rib space after localized under ultrasound. Fluid was not successfully obtained after 18 G needle catheter inserted at the marked site with appropriate depth reached based on ultrasound measures.   A dressing was applied to the wound and Interventional radiology were consulted .  0 ml of  pleural fluid was obtained.   Plan:    A follow up chest x-ray was ordered. Yes  Tylenol 650 mg. for pain.    IR consulted 350 greenish fluid drained with 5 Ethiopian cath     CXR reviewed by me no PTX     Significant Surgical Tasks Conducted by the Assistant(s), if Applicable:    Complications: None; patient tolerated the procedure well.    Estimated Blood Loss (EBL): None    Attestation: I performed the procedure.  No fluid obtained , consulted interventional radiology.     Augustine Rodriguez M.D

## 2020-08-03 NOTE — INTERVAL H&P NOTE
The patient has been examined and the H&P has been reviewed:    I concur with the findings and no changes have occurred since H&P was written.    Surgery risks, benefits and alternative options discussed and understood by patient/family.          Active Hospital Problems    Diagnosis  POA    Pleural effusion on left [J90]  Yes      Resolved Hospital Problems   No resolved problems to display.

## 2020-08-03 NOTE — TELEPHONE ENCOUNTER
----- Message from Miriam García MD sent at 8/3/2020  1:19 PM CDT -----  Potassium still remains stable.  Liver tests much improved.  Repeat labs in 4 weeks.

## 2020-08-03 NOTE — DISCHARGE SUMMARY
Pre Op Diagnosis: left pleural effusion     Post Op Diagnosis: same     Procedure:  Left sided US thoracentesis     Procedure performed by: Hernan CALDERA, Adelaide JUAN     Written Informed Consent Obtained: Yes     Specimen Removed:  yes     Estimated Blood Loss:  minimal     Findings: Local anesthesia     The patient tolerated the procedure well and there were no complications.      Sterile technique was performed in the left posterior thorax, lidocaine was used as a local anesthetic. Approximately 400cc dark lynne fluid removed from left pleural space for therapeutic and diagnostic.  Pt tolerated the procedure well without immediate complications.  Please see radiologist report for details. F/u with PCP and/or ordering physician.

## 2020-08-03 NOTE — DISCHARGE INSTRUCTIONS
Discharge Instructions for Thoracentesis  Thoracentesis is a procedure that removes extra fluid (pleural effusion) from the pleural space. This space is between the outside surface of the lungs (pleura) and the chest wall. The procedure may be done to take a sample of the fluid for testing to help find the cause. Or it may be done to drain the extra fluid if you are having trouble breathing.  Home care  · You may have some pain after the procedure. Your doctor can prescribe or recommend pain medicine for you to take at home, if needed. Take these exactly as directed. If you stopped taking other medicines before the procedure, ask your doctor when you can start them again.  · Take it easy for 48 hours after the procedure. Don't do anything active until your doctor says its OK.  · Don't do strenuous activities, such as lifting, until your doctor says its OK.  · You will have a small bandage over the puncture site. You may remove the bandage in 24 hours.  · Check the puncture site for the signs of infection listed below.  Follow-up  Make a follow-up appointment with your doctor as directed. During your follow-up visit, your doctor will check your healing. Be sure to let your doctor know how you are feeling.  When to call your doctor  Call your doctor right away if you have any of the following:  · Coughing up blood  · Chest pain. If chest pain suddenly gets worse, it may be an emergency.  · Shortness of breath  · Fever of 100.4°F (38°C) or higher, or as directed by your healthcare provider  · Pain that doesn't get better after taking pain medicine  · Signs of infection at the puncture site. These include increased pain, redness, swelling, or warmth.  · Fluid draining from the puncture site   Date Last Reviewed: 10/1/2016  © 4791-3145 [employer]. [address] [place] [address], [place] [address]. All rights reserved. This information is not intended as a substitute for professional medical care. Always follow  your healthcare professional's instructions.

## 2020-08-03 NOTE — INTERVAL H&P NOTE
The patient has been examined and the H&P has been reviewed:    I concur with the findings and no changes have occurred since H&P was written.              Active Hospital Problems    Diagnosis  POA    Pleural effusion on left [J90]  Yes      Resolved Hospital Problems   No resolved problems to display.

## 2020-08-03 NOTE — OP NOTE
Date of Procedure: 08/03/2020     Procedure: Thoracentesis    Indications: Diagnostic    Pre-Operative Diagnosis: left pleural effusion     Post-Operative Diagnosis: same    Anesthesia: local    Technical Procedures Used: US guided needle thoracentesis     Description of the Findings of the Procedure: small left side effusion noted     Consent: Informed consent was obtained. Risks of the procedure were discussed including: infection, bleeding, pain, pneumothorax.    Under sterile conditions the patient was positioned. Chloraprep solution and sterile drapes were utilized. 10 cc 1% plain lidocaine was used to anesthetize between the rib space after localized under ultrasound. Fluid was not successfully obtained after 18 G needle catheter inserted at the marked site with appropriate depth reached based on ultrasound measures.   A dressing was applied to the wound and Interventional radiology were consulted .  0 ml of  pleural fluid was obtained.   Plan:    A follow up chest x-ray was ordered. Yes  Tylenol 650 mg. for pain.    Significant Surgical Tasks Conducted by the Assistant(s), if Applicable:    Complications: None; patient tolerated the procedure well.    Estimated Blood Loss (EBL): None    Attestation: I performed the procedure.  No fluid obtained , consulted interventional radiology.     Augustine Rodriguez M.D

## 2020-08-03 NOTE — DISCHARGE SUMMARY
[facility]   Pulmonology  Discharge Summary      Patient Name: Alon Adamson  MRN: [id number]  Admission Date: 8/3/2020  Hospital Length of Stay: 0 days  Discharge Date and Time:  08/03/2020 2:41 PM  Attending Physician: Augustine Rodriguez MD   Discharging Provider: Augustine Rodriguez MD  Primary Care Provider: Provider [facility]    HPI:  Left pleural effusion   Procedure(s) (LRB):  Thoracentesis  U/S notified (Left)    Indwelling Lines/Drains at Time of Discharge:   Lines/Drains/Airways     None                 Hospital Course:  US thoracentesis , first attempt failed     IR consulted, used a 5 Luxembourgish cath and 350-400 greensih fluid obtained       Significant Labs:  Chemistry , cytology , microbiology sent     ABG  No results for input(s): PH, PO2, PCO2, HCO3, BE in the last 168 hours.    Significant Imaging:  U/S: I have reviewed all pertinent results/findings within the past 24 hours and my personal findings are:  small left side effusion     Pending Diagnostic Studies:     Procedure Component Value Units Date/Time    Cytology, Pulmonary [[id number]] Collected: 08/03/20 1348    Order Status: Sent Lab Status: In process Updated: 08/03/20 1348    Glucose, Peritoneal, Pleural Fluid or CHAS Drainage, In-House Pleural Fluid, Left [[id number]] Collected: 08/03/20 1348    Order Status: Sent Lab Status: In process Updated: 08/03/20 1348    Specimen: Body Fluid     LDH, Peritoneal, Pleural Fluid or CHAS Drainage, In-House Pleural Fluid, Left [[id number]] Collected: 08/03/20 1348    Order Status: Sent Lab Status: In process Updated: 08/03/20 1348    Specimen: Body Fluid     Protein, Peritoneal, Pleural Fluid or CHAS Drainage, In-House Pleural Fluid, Left [[id number]] Collected: 08/03/20 1348    Order Status: Sent Lab Status: In process Updated: 08/03/20 1348    Specimen: Body Fluid     US Chest Mediastinum [[id number]] Resulted: 08/03/20 1350    Order Status: Sent Lab Status: In process Updated: 08/03/20 1428     US Guided Thoracentesis [[id number]] Resulted: 08/03/20 1351    Order Status: Sent Lab Status: In process Updated: 08/03/20 1351    WBC & Diff,Body Fluid Pleural Fluid, Left [[id number]] Collected: 08/03/20 1348    Order Status: Sent Lab Status: In process Updated: 08/03/20 1348    Specimen: Body Fluid     X-Ray Chest AP Portable [[id number]] Resulted: 08/03/20 1437    Order Status: Sent Lab Status: In process Updated: 08/03/20 1438        Final Active Diagnoses:    Diagnosis Date Noted POA    Pleural effusion on left [J90] 08/03/2020 Yes      Problems Resolved During this Admission:       Discharged Condition: good    Disposition:   Home   Follow Up:    Patient Instructions:   No discharge procedures on file.  Medications:  Reconciled Home Medications:      Medication List      CONTINUE taking these medications    ASPIRIN LOW DOSE 81 MG EC tablet  Generic drug: aspirin     levETIRAcetam 500 MG Tab  Commonly known as: KEPPRA  Take 1 tablet (500 mg total) by mouth 2 (two) times daily.     tacrolimus 1 MG Cap  Commonly known as: PROGRAF  Take 2 capsules (2 mg total) by mouth every morning AND 1 capsule (1 mg total) every evening.     VELTASSA 25.2 gram Pwpk  Generic drug: patiromer calcium sorbitex  Take 1 packet (25.2 g total) by mouth once daily.            Augustine Rodriguez MD  Pulmonology  [facility]

## 2020-08-03 NOTE — TELEPHONE ENCOUNTER
----- Message from Ovi Mondragon sent at 8/3/2020  6:59 AM CDT -----  Regarding: Procedure  .Type:  Needs Medical Advice    Who Called:Procedure  Symptoms (please be specific)  How long has patient had these symptoms:  Pharmacy name and phone #:   Would the patient rather a call back or a response via [facility]? both  Best Call Back Number:  [phone] (home)   Additional Information:   Pt is requesting a call back from the nurse in regards to the pt pt knowing if he could eat this morning before his Procedure please

## 2020-08-04 VITALS
SYSTOLIC BLOOD PRESSURE: 109 MMHG | TEMPERATURE: 98 F | DIASTOLIC BLOOD PRESSURE: 73 MMHG | OXYGEN SATURATION: 100 % | RESPIRATION RATE: 18 BRPM | HEART RATE: 78 BPM

## 2020-08-04 LAB
BODY FLUID SOURCE, LDH: NORMAL
GLUCOSE FLD-MCNC: 72 MG/DL
LDH FLD L TO P-CCNC: 250 U/L
PATH INTERP FLD-IMP: NORMAL
PROT FLD-MCNC: 2.3 G/DL
SPECIMEN SOURCE: NORMAL
SPECIMEN SOURCE: NORMAL

## 2020-08-05 NOTE — TELEPHONE ENCOUNTER
Calling patient to inform him that he has been approved through the  Assistance program to get the Veltassa at a 0.00 copay until 12/31/2020. Patient reached-- stated he was contacted by the program and his refill was set up. I provided the patient with their contact phone number: [phone] to schedule his future refills. I also explained to the patient that his approval was till 12/31 therefore if he needed assistance with re-enrolling for the new year to give us a call, patient agreed. @2:58PM -ORLINTASHA

## 2020-08-06 LAB — FINAL PATHOLOGIC DIAGNOSIS: NORMAL

## 2020-08-08 LAB
BACTERIA FLD AEROBE CULT: NO GROWTH
GRAM STN SPEC: NORMAL
GRAM STN SPEC: NORMAL

## 2020-08-10 ENCOUNTER — TELEPHONE (OUTPATIENT)
Dept: GASTROENTEROLOGY | Facility: CLINIC | Age: 65
End: 2020-08-10

## 2020-08-10 DIAGNOSIS — E87.5 HYPERKALEMIA: ICD-10-CM

## 2020-08-10 NOTE — TELEPHONE ENCOUNTER
----- Message from Shirin Avery sent at 8/6/2020 10:36 AM CDT -----  Regarding: Veltassa prescription  Good Morning,  I am reaching out to you concerning the patients Veltassa prescription. [facility] will need a new prescription due to the change in dose. Patient was taking 16.8 g daily and has increased to 25.2 g daily. Please fax a new prescription if appropriate to [facility] @ [phone] so that the patient can continue to receive his Veltassa without interruption.    Thank you for your assistance.    Shirin Avery  Patient Care Advocate  [facility]  [phone]

## 2020-08-19 ENCOUNTER — OFFICE VISIT (OUTPATIENT)
Dept: PULMONOLOGY | Facility: CLINIC | Age: 65
End: 2020-08-19
Payer: MEDICARE

## 2020-08-19 VITALS
RESPIRATION RATE: 18 BRPM | TEMPERATURE: 98 F | DIASTOLIC BLOOD PRESSURE: 74 MMHG | BODY MASS INDEX: 33.47 KG/M2 | SYSTOLIC BLOOD PRESSURE: 120 MMHG | OXYGEN SATURATION: 96 % | HEART RATE: 84 BPM | WEIGHT: 260.81 LBS | HEIGHT: 74 IN

## 2020-08-19 DIAGNOSIS — Z94.4 LIVER REPLACED BY TRANSPLANT: ICD-10-CM

## 2020-08-19 DIAGNOSIS — Z98.890 S/P THORACENTESIS: ICD-10-CM

## 2020-08-19 DIAGNOSIS — J90 PLEURAL EFFUSION ON LEFT: Primary | ICD-10-CM

## 2020-08-19 DIAGNOSIS — N18.30 CKD (CHRONIC KIDNEY DISEASE), STAGE III: ICD-10-CM

## 2020-08-19 PROCEDURE — 99999 PR PBB SHADOW E&M-EST. PATIENT-LVL IV: CPT | Mod: PBBFAC,,, | Performed by: INTERNAL MEDICINE

## 2020-08-19 PROCEDURE — 99999 PR PBB SHADOW E&M-EST. PATIENT-LVL IV: ICD-10-PCS | Mod: PBBFAC,,, | Performed by: INTERNAL MEDICINE

## 2020-08-19 PROCEDURE — 99214 OFFICE O/P EST MOD 30 MIN: CPT | Mod: PBBFAC | Performed by: INTERNAL MEDICINE

## 2020-08-19 PROCEDURE — 99214 OFFICE O/P EST MOD 30 MIN: CPT | Mod: S$PBB,,, | Performed by: INTERNAL MEDICINE

## 2020-08-19 PROCEDURE — 99214 PR OFFICE/OUTPT VISIT, EST, LEVL IV, 30-39 MIN: ICD-10-PCS | Mod: S$PBB,,, | Performed by: INTERNAL MEDICINE

## 2020-08-19 NOTE — PROGRESS NOTES
Pulmonary Outpatient Follow Up Visit     Subjective:       Patient ID: Alon Adamson is a 65 y.o. male.    Chief Complaint: Pleural Effusion on left      HPI          65-year-old male patient presenting for 2 weeks follow-up.    Initially evaluated July 29th for left-sided pleural effusion.    Complains of short of breath on exertion.    Tobacco taper.    Has history of liver transplant 2018 for ETOH liver cirrhosis, on tacrolimus, recently was evaluated for hyperkalemia probably precipitated by Bactrim therapy for lower extremity cellulitis.     Chest x-ray shows left-sided opacities/pleural effusion.  Status post thoracentesis 350 mL of greenish fluid were drained and sent for microbiology chemistry and pathology.            Tobacco dip her quit 2018.     Used to work in the [employer] in [place].     Review of Systems   Constitutional: Positive for fatigue and weakness. Negative for fever and chills.   HENT: Negative for nosebleeds.    Eyes: Negative for redness.   Respiratory: Positive for cough and shortness of breath. Negative for choking.    Cardiovascular: Negative for chest pain.   Genitourinary: Negative for hematuria.   Endocrine: ?   Diabetes mellitus Negative for cold intolerance.    Musculoskeletal: Positive for arthralgias, back pain and gait problem.   Skin: Positive for rash.        Recently treated for LE cellulitis w Bactrim    Gastrointestinal: Negative for vomiting.        Cirrhosis status post liver transplant.   Neurological: Negative for syncope.        History of seizures   Hematological: Negative for adenopathy.        Immunocompromised.   Psychiatric/Behavioral: Negative for confusion.       Outpatient Encounter Medications as of 8/19/2020   Medication Sig Dispense Refill    aspirin (ASPIRIN LOW DOSE) 81 MG EC tablet       levETIRAcetam (KEPPRA) 500 MG Tab Take 1 tablet (500 mg total) by mouth 2 (two) times daily. 60 tablet 1     "patiromer calcium sorbitex (VELTASSA) 25.2 gram PwPk Take 1 packet (25.2 g total) by mouth once daily. 30 packet 6    tacrolimus (PROGRAF) 1 MG Cap Take 2 capsules (2 mg total) by mouth every morning AND 1 capsule (1 mg total) every evening. 90 capsule 11     No facility-administered encounter medications on file as of 8/19/2020.        Objective:     Vital Signs (Most Recent)  Vital Signs  Temp: 97.5 °F (36.4 °C)  Temp src: Temporal  Pulse: 84  Resp: 18  SpO2: 96 %  BP: 120/74  Height and Weight  Height: 6' 2" (188 cm)  Weight: 118.3 kg (260 lb 12.9 oz)  BSA (Calculated - sq m): 2.49 sq meters  BMI (Calculated): 33.5  Weight in (lb) to have BMI = 25: 194.3]  Wt Readings from Last 2 Encounters:   08/19/20 118.3 kg (260 lb 12.9 oz)   07/29/20 116.2 kg (256 lb 2.8 oz)       Physical Exam   Constitutional: He is oriented to person, place, and time. He appears well-developed and well-nourished.   HENT:   Head: Normocephalic.   Neck: Neck supple.   Cardiovascular: Normal rate, regular rhythm and normal heart sounds.   Pulmonary/Chest: Normal expansion and effort normal. No stridor. No respiratory distress. He exhibits no tenderness.   Abdominal: Soft.   Musculoskeletal:         General: No tenderness or deformity.   Lymphadenopathy:     He has no cervical adenopathy.   Neurological: He is alert and oriented to person, place, and time. Gait normal.   Skin: Skin is warm. No cyanosis. Nails show no clubbing.   Psychiatric: He has a normal mood and affect. His behavior is normal. Judgment and thought content normal.   Nursing note and vitals reviewed.      Laboratory  Lab Results   Component Value Date    WBC 5.46 07/15/2020    RBC 4.50 (L) 07/15/2020    HGB 11.4 (L) 07/15/2020    HCT 37.1 (L) 07/15/2020    MCV 82 07/15/2020    MCH 25.3 (L) 07/15/2020    MCHC 30.7 (L) 07/15/2020    RDW 17.0 (H) 07/15/2020     07/15/2020    MPV 9.7 07/15/2020    GRAN 3.8 07/15/2020    GRAN 69.6 07/15/2020    LYMPH 0.8 (L) 07/15/2020 "    LYMPH 13.9 (L) 07/15/2020    MONO 0.7 07/15/2020    MONO 13.2 07/15/2020    EOS 0.1 07/15/2020    BASO 0.04 07/15/2020    EOSINOPHIL 2.6 07/15/2020    BASOPHIL 0.7 07/15/2020       BMP  Lab Results   Component Value Date     (L) 07/15/2020    K 6.0 (H) 07/15/2020     07/15/2020    CO2 19 (L) 07/15/2020    BUN 45 (H) 07/15/2020    CREATININE 2.0 (H) 07/15/2020    CALCIUM 10.0 07/15/2020    ANIONGAP 7 (L) 07/15/2020    ESTGFRAFRICA 40 (A) 07/15/2020    EGFRNONAA 34 (A) 07/15/2020    AST 10 07/15/2020    ALT 9 (L) 07/15/2020    PROT 8.1 07/15/2020       Lab Results   Component Value Date    BNP 96 07/29/2020    BNP 88 01/20/2018       Lab Results   Component Value Date    TSH 5.556 (H) 08/17/2018       No results found for: SEDRATE    No results found for: CRP  No results found for: IGE     No results found for: ASPERGILLUS  No results found for: AFUMIGATUSCL     No results found for: ACE   Results for IVETTE SILVERIO (MRN [id number]) as of 8/19/2020 17:08   Ref. Range 8/3/2020 13:48   Fluid Color Unknown Yellow   Fluid Appearance Unknown Clear   Body Fluid Type Unknown Pleural Fluid, Left   WBC, Body Fluid Latest Units: /cu mm 46   Segs, Fluid Latest Units: % 1   Lymphs, Fluid Latest Units: % 89   Monocytes/Macrophages, Fluid Latest Units: % 10   Glucose, Fluid Latest Ref Range: Not established mg/dL 72   LD, Fluid Latest Ref Range: Not established U/L 250   Body Fluid Source, Glucose Unknown Pleural Fluid, Left   Body Fluid Source, LDH Unknown Pleural Fluid, Left   Body Fluid Source, Total Protein Unknown Pleural Fluid, Left   Body Fluid, Protein Latest Ref Range: Not established g/dL 2.3   Pathologist Review, Body Fluid Unknown REVIEWED       Pleural fluid pathology negative for malignancy.      Diagnostic Results:  I have personally reviewed today the following studies:    Chest x-ray 07/15/2020     There is a large opacities seen within the left lung base that appears to be contiguous with the  pleural space along the medial and lateral aspect of the left hemithorax and near the left lung apex.  Findings are consistent with a large left-sided pleural effusion that is primarily subpulmonic in location.  The heart is not enlarged. There appear to be some vascular coils projecting over the upper abdomen best seen on the lateral film.        2D echo 2018     1 - Normal left ventricular systolic function (EF 55-60%).     2 - Normal left ventricular diastolic function.     3 - Normal right ventricular systolic function .     4 - Trivial mitral regurgitation.     5 - Trivial tricuspid regurgitation.     6 - IVC not well seen. However, most likely based on other findings central venous pressures are not elevated.     Assessment/Plan:   Pleural effusion on left  -     X-Ray Chest PA And Lateral; Future; Expected date: 11/19/2020  -     Echo Color Flow Doppler? Yes; Future    S/P thoracentesis  -     X-Ray Chest PA And Lateral; Future; Expected date: 11/19/2020    CKD (chronic kidney disease), stage III  -     Ambulatory referral/consult to Nephrology; Future; Expected date: 08/26/2020    Liver replaced by transplant     Pleural effusion likely related to fluid overload from CKD/?  CHF/?  Hydrothorax.    No evidence of infection or malignancy in pleural fluid.    Effusion was lymphocytic, low protein in favor of a transudate.    Continue tacrolimus.    Check 2D echo.    Refer patient to nephrologist.    Repeat chest x-ray next visit or earlier if needed.    Follow up in about 3 months (around 11/19/2020).    This note was prepared using voice recognition system and is likely to have sound alike errors that may have been overlooked even after proof reading.  Please call me with any questions    Discussed diagnosis, its evaluation, treatment and usual course. All questions answered.      Augustine Rodriguez MD

## 2020-08-20 ENCOUNTER — TELEPHONE (OUTPATIENT)
Dept: PULMONOLOGY | Facility: CLINIC | Age: 65
End: 2020-08-20

## 2020-08-20 NOTE — TELEPHONE ENCOUNTER
----- Message from Padmini Redd sent at 8/20/2020  9:52 AM CDT -----  Good morning,    Patient would like a call back regarding upcoming appointment.  Patient can be reached at [phone]    Thank you,  Padmini

## 2020-08-20 NOTE — TELEPHONE ENCOUNTER
Spoke to pt about Echo pt would like to have it scheduled at [facility] will fax order over.

## 2020-08-21 ENCOUNTER — TELEPHONE (OUTPATIENT)
Dept: PULMONOLOGY | Facility: CLINIC | Age: 65
End: 2020-08-21

## 2020-08-21 NOTE — TELEPHONE ENCOUNTER
----- Message from Sarah Lynch sent at 8/21/2020  3:10 PM CDT -----  Pt is requesting to speak back with office regarding echo appt. Would like to know has referral been sent. Please call back at [phone]

## 2020-08-21 NOTE — TELEPHONE ENCOUNTER
Spoke to pt and Yeimi from [facility] about faxed over Echo order it was received.

## 2020-08-24 ENCOUNTER — TELEPHONE (OUTPATIENT)
Dept: TRANSPLANT | Facility: CLINIC | Age: 65
End: 2020-08-24

## 2020-08-24 NOTE — TELEPHONE ENCOUNTER
----- Message from Loulou Melchor LPN sent at 8/24/2020  8:36 AM CDT -----  Regarding: FW: medciation  Contact: pt    ----- Message -----  From: Raven Norwood  Sent: 8/24/2020   8:02 AM CDT  To: Ricky AGUERO Staff  Subject: medciation                                       Requesting o know if it is ok to take levothyroxine medication due to transplant.please call back at [phone].

## 2020-08-31 LAB
EXT ALBUMIN: 3
EXT ALKALINE PHOSPHATASE: 86
EXT ALT: 20
EXT AST: 11
EXT BILIRUBIN TOTAL: 0.5
EXT BUN: 19
EXT CALCIUM: 9.2
EXT CHLORIDE: 107
EXT CO2: 29
EXT CREATININE: 1.3 MG/DL
EXT EOSINOPHIL%: 2.5
EXT GFR MDRD NON AF AMER: 56
EXT GLUCOSE: 140
EXT HEMATOCRIT: 34.3
EXT HEMOGLOBIN: 11.1
EXT LYMPH%: 9.5
EXT MAGNESIUM: 0.8
EXT MONOCYTES%: 7.3
EXT PLATELETS: 137
EXT POTASSIUM: 2.8
EXT PROTEIN TOTAL: 6.2
EXT SEGS%: 80
EXT SODIUM: 143 MMOL/L
EXT TACROLIMUS LVL: 5.7
EXT WBC: 5.4

## 2020-09-01 RX ORDER — LANOLIN ALCOHOL/MO/W.PET/CERES
800 CREAM (GRAM) TOPICAL 3 TIMES DAILY
Qty: 180 TABLET | Refills: 3 | Status: SHIPPED | OUTPATIENT
Start: 2020-09-01 | End: 2020-10-06

## 2020-09-01 NOTE — TELEPHONE ENCOUNTER
"Patient notified and instructed to hold the Veltassa (due to low potassium level) and start taking Magnesium 800mg three times daily(due to low magnesium level) , he said he "just filled the magnesium before was told to stop, so has enough to start.  Patient instructed to have repeat labs on Thursday 9/3/20.  He voiced understanding.  He denied any recent illnesses, but said his local doctor started him on "thyroid medicine.",  informed.  "

## 2020-09-01 NOTE — TELEPHONE ENCOUNTER
----- Message from Miriam García MD sent at 9/1/2020  4:46 PM CDT -----  Potassium and magnesium a significantly low.  Need to check with patient to see if he has been sick for had any other medications that were started are stopped.  Recommend stopping Veltassa for now. Start magnesium 800mg three times a day asap, repeat labs Thursday

## 2020-09-03 LAB
EXT ALBUMIN: 3.1
EXT ALKALINE PHOSPHATASE: 87
EXT ALT: 48
EXT AST: 32
EXT BILIRUBIN TOTAL: 0.5
EXT BUN: 16
EXT CALCIUM: 9
EXT CHLORIDE: 108
EXT CO2: 29
EXT CREATININE: 1.2 MG/DL
EXT EOSINOPHIL%: 3.2
EXT GFR MDRD NON AF AMER: >60
EXT GLUCOSE: 136
EXT HEMATOCRIT: 34.3
EXT HEMOGLOBIN: 11.1
EXT LYMPH%: 12
EXT MONOCYTES%: 9.8
EXT PLATELETS: 143
EXT POTASSIUM: 3
EXT PROTEIN TOTAL: 6.1
EXT SEGS%: 74.4
EXT SODIUM: 142 MMOL/L
EXT TACROLIMUS LVL: 5.5
EXT WBC: 4.2

## 2020-09-04 ENCOUNTER — TELEPHONE (OUTPATIENT)
Dept: TRANSPLANT | Facility: CLINIC | Age: 65
End: 2020-09-04

## 2020-09-04 NOTE — TELEPHONE ENCOUNTER
Patient notified and instructed: labs reviewed by (Prograf and magnesium levels pending) , needs to continue to hold Veltassa , and repeat labs on 9/8/20.  Lab orders faxed to his local lab to add stat Magnesium level to labs on 9/8. Patient voiced understanding.

## 2020-09-04 NOTE — TELEPHONE ENCOUNTER
----- Message from Miriam García MD sent at 9/4/2020  4:13 PM CDT -----  Liver test slightly elevated, potassium is improving, continue to hold veltassa, awaiting magnesium and immunosuppression, repeat labs next week including magnesium

## 2020-09-08 LAB
EXT ALBUMIN: 3
EXT ALKALINE PHOSPHATASE: 95
EXT ALT: 22
EXT AST: 27
EXT BILIRUBIN TOTAL: 0.7
EXT BUN: 20
EXT CALCIUM: 7.8
EXT CHLORIDE: 103
EXT CO2: 31
EXT CREATININE: 1.1 MG/DL
EXT EOSINOPHIL%: 3.4
EXT GFR MDRD NON AF AMER: >60
EXT GLUCOSE: 144
EXT HEMATOCRIT: 33.1
EXT HEMOGLOBIN: 10.9
EXT LYMPH%: 10.3
EXT MAGNESIUM: 2.8
EXT MONOCYTES%: 9.6
EXT PLATELETS: 151
EXT POTASSIUM: 3.4
EXT PROTEIN TOTAL: 6.4
EXT SEGS%: 75.9
EXT SODIUM: 139 MMOL/L
EXT TACROLIMUS LVL: 6.7
EXT WBC: 3.8

## 2020-09-11 ENCOUNTER — TELEPHONE (OUTPATIENT)
Dept: TRANSPLANT | Facility: CLINIC | Age: 65
End: 2020-09-11

## 2020-09-11 NOTE — TELEPHONE ENCOUNTER
Patient notified and instructed to stop taking the Magnesium. And repeat labs on Monday 9/14; will also order magnesium level with labs.  He was able to repeat instructions and voiced understanding.

## 2020-09-11 NOTE — TELEPHONE ENCOUNTER
----- Message from Miriam García MD sent at 9/11/2020  2:51 PM CDT -----  Magnesium level is now high.  Discontinue magnesium.  Potassium continues to improve.  Repeat labs next week including magnesium level.

## 2020-09-14 LAB
EXT ALBUMIN: 3.2
EXT ALKALINE PHOSPHATASE: 100
EXT ALT: 16
EXT AST: 18
EXT BILIRUBIN TOTAL: 0.9
EXT BUN: 20
EXT CALCIUM: 7.9
EXT CHLORIDE: 102
EXT CO2: 30
EXT CREATININE: 1.5 MG/DL
EXT GFR MDRD NON AF AMER: 47
EXT GLUCOSE: 137
EXT HEMATOCRIT: 34.8
EXT HEMOGLOBIN: 11.4
EXT MAGNESIUM: 2.4
EXT PLATELETS: 161
EXT POTASSIUM: 3.8
EXT PROTEIN TOTAL: 6.4
EXT SODIUM: 138 MMOL/L
EXT TACROLIMUS LVL: 6.8
EXT WBC: 4.2

## 2020-09-17 ENCOUNTER — TELEPHONE (OUTPATIENT)
Dept: TRANSPLANT | Facility: CLINIC | Age: 65
End: 2020-09-17

## 2020-09-17 NOTE — TELEPHONE ENCOUNTER
----- Message from Miriam García MD sent at 9/17/2020 10:28 AM CDT -----  Magnesium ad potassium are back to normal, not sure what happened a few weeks ago? Other labs ok, repeat labs next week to make sure veltassa does not need to be restart. If we restart would start at a lower dose.

## 2020-09-17 NOTE — TELEPHONE ENCOUNTER
Patient notified and instructed : labs ok, magnesium and potassium levels normal. No changes made. Needs repeat labs next week to see if will need to restart Veltassa.. He was able to repeat instructions and voiced understanding.

## 2020-09-21 LAB
EXT ALBUMIN: 3.1
EXT ALKALINE PHOSPHATASE: 81
EXT ALT: 13
EXT AST: 12
EXT BILIRUBIN TOTAL: 0.7
EXT BUN: 22
EXT CALCIUM: 7.9
EXT CHLORIDE: 107
EXT CO2: 26
EXT CREATININE: 1.3 MG/DL
EXT EOSINOPHIL%: 2.2
EXT GFR MDRD NON AF AMER: 56
EXT GLUCOSE: 114
EXT HEMATOCRIT: 32.2
EXT HEMOGLOBIN: 10.6
EXT LYMPH%: 11.2
EXT MAGNESIUM: 1.8
EXT MONOCYTES%: 10.4
EXT PLATELETS: 153
EXT POTASSIUM: 3.7
EXT PROTEIN TOTAL: 6.3
EXT SEGS%: 75.2
EXT SODIUM: 139 MMOL/L
EXT TACROLIMUS LVL: 4.5
EXT WBC: 3.5

## 2020-09-25 ENCOUNTER — TELEPHONE (OUTPATIENT)
Dept: TRANSPLANT | Facility: CLINIC | Age: 65
End: 2020-09-25

## 2020-09-25 NOTE — TELEPHONE ENCOUNTER
----- Message from Miriam García MD sent at 9/25/2020  4:03 PM CDT -----  Labs ok repeat in 2 weeks

## 2020-09-25 NOTE — TELEPHONE ENCOUNTER
Patient notified and instructed , labs ok, no changes made. Repeat labs in 2 weeks (10/5/20). He was able to repeat instructions and voiced understanding.

## 2020-10-05 LAB
EXT ALBUMIN: 2.8
EXT ALKALINE PHOSPHATASE: 88
EXT ALT: 11
EXT AST: 7
EXT BILIRUBIN TOTAL: 0.4
EXT BUN: 25
EXT CALCIUM: 7.8
EXT CHLORIDE: 110
EXT CO2: 28
EXT CREATININE: 1.5 MG/DL
EXT GFR MDRD NON AF AMER: 47
EXT GLUCOSE: 121
EXT HEMATOCRIT: 33.7
EXT HEMOGLOBIN: 11.2
EXT MAGNESIUM: 1.6
EXT PLATELETS: 116
EXT POTASSIUM: 3.7
EXT PROTEIN TOTAL: 5.8
EXT SODIUM: 142 MMOL/L
EXT TACROLIMUS LVL: 5
EXT WBC: 4

## 2020-10-06 LAB
ACID FAST MOD KINY STN SPEC: NORMAL
MYCOBACTERIUM SPEC QL CULT: NORMAL

## 2020-10-06 NOTE — TELEPHONE ENCOUNTER
----- Message from Miriam Gracía MD sent at 10/5/2020  4:14 PM CDT -----  Patient's magnesium is now dropping.  He can restart magnesium 800 mg twice a day.  Awaiting immunosuppression level.  Liver tests are okay.  Potassium remains okay.

## 2020-10-06 NOTE — TELEPHONE ENCOUNTER
Patient notified and instructed to re-start Magnesium 800mg twice daily. He was able to repeat instructions and voiced understanding.

## 2020-10-09 ENCOUNTER — TELEPHONE (OUTPATIENT)
Dept: TRANSPLANT | Facility: CLINIC | Age: 65
End: 2020-10-09

## 2020-10-09 RX ORDER — LANOLIN ALCOHOL/MO/W.PET/CERES
800 CREAM (GRAM) TOPICAL 2 TIMES DAILY
Qty: 120 TABLET | Refills: 3 | Status: SHIPPED | OUTPATIENT
Start: 2020-10-09 | End: 2020-11-19 | Stop reason: DRUGHIGH

## 2020-10-09 NOTE — TELEPHONE ENCOUNTER
----- Message from Miriam García MD sent at 10/8/2020  4:42 PM CDT -----  Tacrolimus level is okay.  Repeat labs in 2 weeks.

## 2020-10-09 NOTE — TELEPHONE ENCOUNTER
Message left for patient with instructions: Prograf level ok, repeat labs in 2 weeks (due 10/19/20). Requested a call back for any questions.

## 2020-10-21 LAB
EXT ALBUMIN: 2.4
EXT ALKALINE PHOSPHATASE: 86
EXT ALT: 10
EXT AST: 15
EXT BILIRUBIN TOTAL: 0.4
EXT BUN: 24
EXT CALCIUM: 7.6
EXT CHLORIDE: 111
EXT CO2: 25
EXT CREATININE: 2.1 MG/DL
EXT GFR MDRD NON AF AMER: 32
EXT GLUCOSE: 122
EXT HEMATOCRIT: 33.4
EXT HEMOGLOBIN: 10.8
EXT MAGNESIUM: 2.6
EXT PLATELETS: 108
EXT POTASSIUM: 4.4
EXT PROTEIN TOTAL: 5.4
EXT SODIUM: 140 MMOL/L
EXT TACROLIMUS LVL: 5.6
EXT WBC: 3.5

## 2020-10-23 ENCOUNTER — TELEPHONE (OUTPATIENT)
Dept: TRANSPLANT | Facility: CLINIC | Age: 65
End: 2020-10-23

## 2020-10-23 NOTE — TELEPHONE ENCOUNTER
"Patient notified and instructed to stop the Magnesium due to level is too high, and to f/u with his PCP for management of the kidney function, and to increase hydration .  He reported that he has appointment next week with his PCP, and supposed to be getting appointment with a nephrologist.  Will increase water intake and avoid NSAIDs,  Denied use of any new medications recently , last new medicine "about a month ago" was Thyroid med ,that he said he was only on for a short time and has since stopped taking. , he said.  Will repeat labs on Monday 10/26/20. Lab Order faxed to his local lab.      "

## 2020-10-23 NOTE — TELEPHONE ENCOUNTER
----- Message from Miriam García MD sent at 10/21/2020  6:27 PM CDT -----  Magnesium is now too high again for unclear reasons.  Stop magnesium supplementation.  Kidney function is also increased from patient's baseline although tacrolimus level is not too high.  Please make sure patient is taking adequate hydration.  He needs primary care put follow-up as relates to his renal function.  Repeat labs next week including magnesium.  Please check whether not patients taking any new medications or supplements as his labs have been all over the place the last month or 2.

## 2020-10-26 LAB
EXT ALBUMIN: 2.3
EXT ALKALINE PHOSPHATASE: 84
EXT ALT: 9
EXT AST: 16
EXT BILIRUBIN TOTAL: 0.4
EXT BUN: 25
EXT CALCIUM: 7.3
EXT CHLORIDE: 111
EXT CO2: 24
EXT CREATININE: 2.6 MG/DL
EXT GFR MDRD NON AF AMER: 25
EXT GLUCOSE: 112
EXT HEMATOCRIT: 32.9
EXT HEMOGLOBIN: 10.9
EXT MAGNESIUM: 2.2
EXT PLATELETS: 107
EXT POTASSIUM: 4
EXT PROTEIN TOTAL: 5.2
EXT SODIUM: 142 MMOL/L
EXT TACROLIMUS LVL: 6.2
EXT WBC: 3.4

## 2020-11-09 LAB
EXT ALBUMIN: 2.1
EXT ALKALINE PHOSPHATASE: 77
EXT ALT: 7
EXT AST: 9
EXT BILIRUBIN TOTAL: 0.3
EXT BUN: 29
EXT CALCIUM: 7
EXT CHLORIDE: 114
EXT CO2: 20
EXT CREATININE: 3.1 MG/DL
EXT GFR MDRD NON AF AMER: 20
EXT GLUCOSE: 98
EXT HEMATOCRIT: 31.1
EXT HEMOGLOBIN: 10.4
EXT MAGNESIUM: 1.2
EXT PLATELETS: 105
EXT POTASSIUM: 3.3
EXT PROTEIN TOTAL: 5.5
EXT SODIUM: 141 MMOL/L
EXT TACROLIMUS LVL: 4.5
EXT WBC: 4

## 2020-11-10 NOTE — ASSESSMENT & PLAN NOTE
- With CKD3   - Expected VICKY post txp  - UOP unclear as pt unable to measure urine (severe scrotal edema)  - Tolerated HD 7/30 PM  - Nephrology on board: placed pt on lasix 100 mg IV BID  - Albumin 25% 100 cc x 1 today   TELE:  Notified of anxiety/restlessness.  Apparently denying pain.  OK with 12.5 PO of seroquel. qtc ok 460.

## 2020-11-11 ENCOUNTER — TELEPHONE (OUTPATIENT)
Dept: TRANSPLANT | Facility: CLINIC | Age: 65
End: 2020-11-11

## 2020-11-11 NOTE — TELEPHONE ENCOUNTER
----- Message from Miriam García MD sent at 11/9/2020  2:40 PM CST -----  Restart magnesium 800 mg twice a day.  Repeat magnesium with labs next week.

## 2020-11-11 NOTE — TELEPHONE ENCOUNTER
"Patient called to report he saw the nephrologist yesterday and will be having a kidney ultrasound and a kidney biopsy scheduled.  Also said he is taking Magnesium again.  He said he called to report that the nephrologist ordered a potassium test and called him back with instructions to go to the ER because the level was "7.1". he will keep our office updated.    "

## 2020-11-12 ENCOUNTER — TELEPHONE (OUTPATIENT)
Dept: TRANSPLANT | Facility: CLINIC | Age: 65
End: 2020-11-12

## 2020-11-12 NOTE — TELEPHONE ENCOUNTER
----- Message from Zoe Diallo sent at 11/12/2020  8:50 AM CST -----  Regarding: Potassium Level  Contact: Pt @ [phone]  Pt states he needs to speak with Helen in regards to potassium level states it is at 3.1    Call back: [phone]

## 2020-11-12 NOTE — TELEPHONE ENCOUNTER
"Returned patient call; he reported that he went to ER as requested by the nephrologist for elevated potassium and the ER chito it and it was "3.1, so they did not have to do anything, they said that the elevated one was a bad draw," he said.  Patient also asked to remind  that she was going to consider changing him off of the Prograf "to something else ". Will review with .      "

## 2020-11-17 ENCOUNTER — HOSPITAL ENCOUNTER (OUTPATIENT)
Dept: RADIOLOGY | Facility: HOSPITAL | Age: 65
Discharge: HOME | End: 2020-11-17
Attending: INTERNAL MEDICINE
Payer: MEDICARE

## 2020-11-17 ENCOUNTER — OFFICE VISIT (OUTPATIENT)
Dept: PULMONOLOGY | Facility: CLINIC | Age: 65
End: 2020-11-17
Payer: MEDICARE

## 2020-11-17 VITALS
HEIGHT: 74 IN | TEMPERATURE: 98 F | DIASTOLIC BLOOD PRESSURE: 82 MMHG | RESPIRATION RATE: 17 BRPM | WEIGHT: 272.06 LBS | HEART RATE: 102 BPM | OXYGEN SATURATION: 97 % | SYSTOLIC BLOOD PRESSURE: 134 MMHG | BODY MASS INDEX: 34.91 KG/M2

## 2020-11-17 DIAGNOSIS — Z23 NEED FOR 23-POLYVALENT PNEUMOCOCCAL POLYSACCHARIDE VACCINE: ICD-10-CM

## 2020-11-17 DIAGNOSIS — J90 PLEURAL EFFUSION ON LEFT: Primary | ICD-10-CM

## 2020-11-17 DIAGNOSIS — J90 PLEURAL EFFUSION ON LEFT: ICD-10-CM

## 2020-11-17 DIAGNOSIS — Z23 NEED FOR INFLUENZA VACCINATION: ICD-10-CM

## 2020-11-17 DIAGNOSIS — Z98.890 S/P THORACENTESIS: ICD-10-CM

## 2020-11-17 DIAGNOSIS — N18.4 STAGE 4 CHRONIC KIDNEY DISEASE: ICD-10-CM

## 2020-11-17 DIAGNOSIS — Z94.4 LIVER REPLACED BY TRANSPLANT: ICD-10-CM

## 2020-11-17 LAB
EXT ALBUMIN: 2.2
EXT ALKALINE PHOSPHATASE: 94
EXT ALT: 10
EXT AST: 14
EXT BILIRUBIN TOTAL: 0.3
EXT BUN: 25
EXT CALCIUM: 8.1
EXT CHLORIDE: 108
EXT CO2: 25
EXT CREATININE: 3 MG/DL
EXT GFR MDRD NON AF AMER: 21
EXT GLUCOSE: 125
EXT HEMATOCRIT: 32.6
EXT HEMOGLOBIN: 11
EXT MAGNESIUM: 2.7
EXT PLATELETS: 114
EXT POTASSIUM: 3.1
EXT PROTEIN TOTAL: 5.1
EXT SODIUM: 140 MMOL/L
EXT TACROLIMUS LVL: 5.3
EXT WBC: 4.1

## 2020-11-17 PROCEDURE — 99214 OFFICE O/P EST MOD 30 MIN: CPT | Mod: S$PBB,,, | Performed by: INTERNAL MEDICINE

## 2020-11-17 PROCEDURE — 99214 OFFICE O/P EST MOD 30 MIN: CPT | Mod: PBBFAC,25 | Performed by: INTERNAL MEDICINE

## 2020-11-17 PROCEDURE — G0009 ADMIN PNEUMOCOCCAL VACCINE: HCPCS | Mod: PBBFAC

## 2020-11-17 PROCEDURE — 71046 X-RAY EXAM CHEST 2 VIEWS: CPT | Mod: TC

## 2020-11-17 PROCEDURE — 71046 XR CHEST PA AND LATERAL: ICD-10-PCS | Mod: 26,,, | Performed by: RADIOLOGY

## 2020-11-17 PROCEDURE — 71046 X-RAY EXAM CHEST 2 VIEWS: CPT | Mod: 26,,, | Performed by: RADIOLOGY

## 2020-11-17 PROCEDURE — 99214 PR OFFICE/OUTPT VISIT, EST, LEVL IV, 30-39 MIN: ICD-10-PCS | Mod: S$PBB,,, | Performed by: INTERNAL MEDICINE

## 2020-11-17 PROCEDURE — 99999 PR PBB SHADOW E&M-EST. PATIENT-LVL IV: CPT | Mod: PBBFAC,,, | Performed by: INTERNAL MEDICINE

## 2020-11-17 PROCEDURE — 90686 IIV4 VACC NO PRSV 0.5 ML IM: CPT | Mod: PBBFAC

## 2020-11-17 PROCEDURE — 99999 PR PBB SHADOW E&M-EST. PATIENT-LVL IV: ICD-10-PCS | Mod: PBBFAC,,, | Performed by: INTERNAL MEDICINE

## 2020-11-17 RX ORDER — FUROSEMIDE 40 MG/1
80 TABLET ORAL 2 TIMES DAILY
COMMUNITY
Start: 2020-11-10 | End: 2023-01-01

## 2020-11-17 NOTE — PROGRESS NOTES
Pulmonary Outpatient Follow Up Visit     Subjective:       Patient ID: Alon Adamson is a 65 y.o. male.    Chief Complaint: Pleural Effusion      HPI            65-year-old male patient presenting for 3 months follow-up    Initially evaluated July 29th for left-sided pleural effusion.    Complains of short of breath on exertion.    Has history of liver transplant 2018 for ETOH liver cirrhosis, on tacrolimus, recently was evaluated for hyperkalemia probably precipitated by Bactrim therapy for lower extremity cellulitis.     Chest x-ray shows left-sided opacities/pleural effusion.  Status post thoracentesis 350 mL of greenish fluid were drained and sent for microbiology chemistry and pathology.  None relevant microbiology and pathology .     Tobacco dip her quit 2018.     Used to work in the [employer] in [place].      Review of Systems   Constitutional: Positive for fatigue and weakness. Negative for fever and chills.   HENT: Negative for nosebleeds.    Eyes: Negative for redness.   Respiratory: Positive for cough and shortness of breath. Negative for choking.    Cardiovascular: Negative for chest pain.   Genitourinary: Negative for hematuria.   Endocrine: ?   Diabetes mellitus Negative for cold intolerance.    Musculoskeletal: Positive for arthralgias, back pain and gait problem.   Skin: Positive for rash.        Recently treated for LE cellulitis w Bactrim    Gastrointestinal: Negative for vomiting.        Cirrhosis status post liver transplant.   Neurological: Negative for syncope.        History of seizures   Hematological: Negative for adenopathy.        Immunocompromised.   Psychiatric/Behavioral: Negative for confusion.       Outpatient Encounter Medications as of 11/17/2020   Medication Sig Dispense Refill    aspirin (ASPIRIN LOW DOSE) 81 MG EC tablet       furosemide (LASIX) 40 MG tablet TK 1 T PO QD      levETIRAcetam (KEPPRA) 500 MG Tab Take 1  "tablet (500 mg total) by mouth 2 (two) times daily. 60 tablet 1    magnesium oxide (MAG-OX) 400 mg (241.3 mg magnesium) tablet Take 2 tablets (800 mg total) by mouth 2 (two) times daily. 120 tablet 3    tacrolimus (PROGRAF) 1 MG Cap Take 2 capsules (2 mg total) by mouth every morning AND 1 capsule (1 mg total) every evening. 90 capsule 11     No facility-administered encounter medications on file as of 11/17/2020.        Objective:     Vital Signs (Most Recent)  Vital Signs  Temp: 98.1 °F (36.7 °C)  Pulse: 102  Resp: 17  SpO2: 97 %  BP: 134/82  Height and Weight  Height: 6' 2" (188 cm)  Weight: 123.4 kg (272 lb 0.8 oz)  BSA (Calculated - sq m): 2.54 sq meters  BMI (Calculated): 34.9  Weight in (lb) to have BMI = 25: 194.3]  Wt Readings from Last 2 Encounters:   11/17/20 123.4 kg (272 lb 0.8 oz)   08/19/20 118.3 kg (260 lb 12.9 oz)       Physical Exam   Constitutional: He is oriented to person, place, and time. He appears well-developed and well-nourished.   HENT:   Head: Normocephalic.   Neck: Neck supple.   Cardiovascular: Normal rate, regular rhythm and normal heart sounds.   Pulmonary/Chest: Normal expansion and effort normal. No stridor. No respiratory distress. He exhibits no tenderness.   Abdominal: Soft.   Musculoskeletal:         General: No tenderness or deformity.   Lymphadenopathy:     He has no cervical adenopathy.   Neurological: He is alert and oriented to person, place, and time. Gait normal.   Skin: Skin is warm. No cyanosis. Nails show no clubbing.   Psychiatric: He has a normal mood and affect. His behavior is normal. Judgment and thought content normal.   Nursing note and vitals reviewed.      Laboratory  Lab Results   Component Value Date    WBC 5.46 07/15/2020    RBC 4.50 (L) 07/15/2020    HGB 11.4 (L) 07/15/2020    HCT 37.1 (L) 07/15/2020    MCV 82 07/15/2020    MCH 25.3 (L) 07/15/2020    MCHC 30.7 (L) 07/15/2020    RDW 17.0 (H) 07/15/2020     07/15/2020    MPV 9.7 07/15/2020    GRAN " 3.8 07/15/2020    GRAN 69.6 07/15/2020    LYMPH 0.8 (L) 07/15/2020    LYMPH 13.9 (L) 07/15/2020    MONO 0.7 07/15/2020    MONO 13.2 07/15/2020    EOS 0.1 07/15/2020    BASO 0.04 07/15/2020    EOSINOPHIL 2.6 07/15/2020    BASOPHIL 0.7 07/15/2020       BMP  Lab Results   Component Value Date     (L) 07/15/2020    K 6.0 (H) 07/15/2020     07/15/2020    CO2 19 (L) 07/15/2020    BUN 45 (H) 07/15/2020    CREATININE 2.0 (H) 07/15/2020    CALCIUM 10.0 07/15/2020    ANIONGAP 7 (L) 07/15/2020    ESTGFRAFRICA 40 (A) 07/15/2020    EGFRNONAA 34 (A) 07/15/2020    AST 10 07/15/2020    ALT 9 (L) 07/15/2020    PROT 8.1 07/15/2020       Lab Results   Component Value Date    BNP 96 07/29/2020    BNP 88 01/20/2018       Lab Results   Component Value Date    TSH 5.556 (H) 08/17/2018       No results found for: SEDRATE    No results found for: CRP  No results found for: IGE     No results found for: ASPERGILLUS  No results found for: AFUMIGATUSCL     No results found for: ACE   Results for IVETTE SILVERIO (MRN [id number]) as of 8/19/2020 17:08   Ref. Range 8/3/2020 13:48   Fluid Color Unknown Yellow   Fluid Appearance Unknown Clear   Body Fluid Type Unknown Pleural Fluid, Left   WBC, Body Fluid Latest Units: /cu mm 46   Segs, Fluid Latest Units: % 1   Lymphs, Fluid Latest Units: % 89   Monocytes/Macrophages, Fluid Latest Units: % 10   Glucose, Fluid Latest Ref Range: Not established mg/dL 72   LD, Fluid Latest Ref Range: Not established U/L 250   Body Fluid Source, Glucose Unknown Pleural Fluid, Left   Body Fluid Source, LDH Unknown Pleural Fluid, Left   Body Fluid Source, Total Protein Unknown Pleural Fluid, Left   Body Fluid, Protein Latest Ref Range: Not established g/dL 2.3   Pathologist Review, Body Fluid Unknown REVIEWED       Pleural fluid pathology negative for malignancy.      Diagnostic Results:  I have personally reviewed today the following studies:    Chest x-ray 07/15/2020     There is a large opacities seen  within the left lung base that appears to be contiguous with the pleural space along the medial and lateral aspect of the left hemithorax and near the left lung apex.  Findings are consistent with a large left-sided pleural effusion that is primarily subpulmonic in location.  The heart is not enlarged. There appear to be some vascular coils projecting over the upper abdomen best seen on the lateral film.        2D echo 2018     1 - Normal left ventricular systolic function (EF 55-60%).     2 - Normal left ventricular diastolic function.     3 - Normal right ventricular systolic function .     4 - Trivial mitral regurgitation.     5 - Trivial tricuspid regurgitation.     6 - IVC not well seen. However, most likely based on other findings central venous pressures are not elevated.           Assessment/Plan:   Pleural effusion on left  -     X-Ray Chest PA And Lateral; Future; Expected date: 05/17/2021    S/P thoracentesis  -     X-Ray Chest PA And Lateral; Future; Expected date: 05/17/2021    Liver replaced by transplant    Stage 4 chronic kidney disease    Need for 23-polyvalent pneumococcal polysaccharide vaccine  -     Pneumococcal Polysaccharide Vaccine (23 Valent) (SQ/IM)    Need for influenza vaccination           Pleural effusion likely related to fluid overload from CKD/?  CHF/?  Hydrothorax.  Three months surveillance chest x-ray stable.  Next    No evidence of infection or malignancy in pleural fluid.    Effusion was lymphocytic, low protein in favor of a transudate.    Continue tacrolimus.    Check 2D echo.      Currently following with a nephrologist in [place].  Scheduled for kidney biopsy.    Repeat chest x-ray in 6 months.      Up-to-date on Prevnar 13.  P PSV 23 and influenza vaccination today.    Follow up in about 6 months (around 5/17/2021).    This note was prepared using voice recognition system and is likely to have sound alike errors that may have been overlooked even after proof reading.   Please call me with any questions    Discussed diagnosis, its evaluation, treatment and usual course. All questions answered.      Augustine Rodriguez MD

## 2020-11-18 ENCOUNTER — TELEPHONE (OUTPATIENT)
Dept: PULMONOLOGY | Facility: CLINIC | Age: 65
End: 2020-11-18

## 2020-11-18 NOTE — TELEPHONE ENCOUNTER
Spoke to Yeimi from [facility] about the echocardiogram the pt completed she will fax over these documents.

## 2020-11-18 NOTE — TELEPHONE ENCOUNTER
----- Message from Soto Braden sent at 11/18/2020  8:56 AM CST -----  ..Type:  Patient Returning Call    Who Called: Yeimi ( Cardio )   Who Left Message for Patient:  Does the patient know what this is regarding?: appt   Would the patient rather a call back or a response via [facility]? Call back   Best Call Back Number: [phone]  Additional Information: f/u on pt appt

## 2020-11-19 RX ORDER — LANOLIN ALCOHOL/MO/W.PET/CERES
800 CREAM (GRAM) TOPICAL DAILY
Qty: 120 TABLET | Refills: 3 | Status: SHIPPED | OUTPATIENT
Start: 2020-11-19 | End: 2021-01-06 | Stop reason: ALTCHOICE

## 2020-11-19 NOTE — TELEPHONE ENCOUNTER
Patient notified and instructed to reduce Magnesium dose to once daily , repeat Magnesium level next week and full labs in  4 weeks ,  She will await kidney biopsy results before considering switch from Prograf to another immunosuppressant.  He was able to repeat instructions and voiced understanding.

## 2020-11-19 NOTE — TELEPHONE ENCOUNTER
----- Message from Miriam García MD sent at 11/18/2020  4:42 PM CST -----  Potassium is low.  Continue to remain low not high.  No indication for medications.  Liver tests overall doing well.  Magnesium continues to fluctuate.  Decrease magnesium supplementation to daily dosing.  Repeat magnesium next week.  Other real wise repeat full transplant labs in 4 weeks.

## 2020-11-20 ENCOUNTER — TELEPHONE (OUTPATIENT)
Dept: TRANSPLANT | Facility: CLINIC | Age: 65
End: 2020-11-20

## 2020-11-20 NOTE — TELEPHONE ENCOUNTER
"Returned patient call; he reported he saw his nephrologist today and was given Rxs' for Sodium Bicarbonate/ Vitamin D / and Potassium supplement.   He was asking if ok to be on these.  Informed him ok to be on these meds.  He said he is being seen again by his nephrologist " next Friday for repeat Potassium lab".    "

## 2020-11-22 ENCOUNTER — TELEPHONE (OUTPATIENT)
Dept: PULMONOLOGY | Facility: CLINIC | Age: 65
End: 2020-11-22

## 2020-11-22 DIAGNOSIS — J90 PLEURAL EFFUSION ON LEFT: Primary | ICD-10-CM

## 2020-11-23 ENCOUNTER — TELEPHONE (OUTPATIENT)
Dept: TRANSPLANT | Facility: CLINIC | Age: 65
End: 2020-11-23

## 2020-11-23 ENCOUNTER — PATIENT MESSAGE (OUTPATIENT)
Dept: TRANSPLANT | Facility: CLINIC | Age: 65
End: 2020-11-23

## 2020-11-23 LAB — EXT MAGNESIUM: 1.9

## 2020-11-23 NOTE — TELEPHONE ENCOUNTER
----- Message from Miriam García MD sent at 11/23/2020 12:06 PM CST -----  Magnesium is now back in the normal range.  Repeat magnesium level with next set of full transplant labs.

## 2020-11-23 NOTE — TELEPHONE ENCOUNTER
Patient notified and instructed via [facility]:    Your magnesium level was back in the normal range, thanks. Repeat labs as we spoke about, thanks.

## 2020-11-27 ENCOUNTER — TELEPHONE (OUTPATIENT)
Dept: PULMONOLOGY | Facility: CLINIC | Age: 65
End: 2020-11-27

## 2020-11-27 NOTE — TELEPHONE ENCOUNTER
Returned call to patient. Informed him that I do not see any results from an echo in his chart.   He stated understanding and had no further questions.   Called [facility] and left message for the echo tech to call me back so we can request the results.----- Message from Neelam Maher sent at 11/27/2020 12:43 PM CST -----  Contact: [phone]  Would like to consult with the nurse ,patient would like to know if his Result for his Ecko  Test  patient  would like a call back concerning this, please call back thanks sj

## 2020-11-30 LAB — EXT POTASSIUM: 4.2

## 2020-12-01 ENCOUNTER — PATIENT MESSAGE (OUTPATIENT)
Dept: TRANSPLANT | Facility: CLINIC | Age: 65
End: 2020-12-01

## 2020-12-01 ENCOUNTER — TELEPHONE (OUTPATIENT)
Dept: TRANSPLANT | Facility: CLINIC | Age: 65
End: 2020-12-01

## 2020-12-01 NOTE — TELEPHONE ENCOUNTER
----- Message from Miriam García MD sent at 12/1/2020  4:21 PM CST -----  Reviewed, nothing to do; repeat per routine

## 2020-12-07 ENCOUNTER — TELEPHONE (OUTPATIENT)
Dept: PULMONOLOGY | Facility: CLINIC | Age: 65
End: 2020-12-07

## 2020-12-15 ENCOUNTER — TELEPHONE (OUTPATIENT)
Dept: TRANSPLANT | Facility: CLINIC | Age: 65
End: 2020-12-15

## 2020-12-16 LAB
EXT ALBUMIN: 2.7
EXT ALKALINE PHOSPHATASE: 92
EXT ALT: 9
EXT AST: 10
EXT BILIRUBIN TOTAL: 0.4
EXT BUN: 45
EXT CALCIUM: 8.6
EXT CHLORIDE: 109
EXT CO2: 22
EXT CREATININE: 5 MG/DL
EXT GFR MDRD NON AF AMER: 12
EXT GLUCOSE: 104
EXT HEMATOCRIT: 34
EXT HEMOGLOBIN: 11.4
EXT MAGNESIUM: 1.9
EXT PLATELETS: 143
EXT POTASSIUM: 6.5
EXT PROTEIN TOTAL: 6.3
EXT SODIUM: 138 MMOL/L
EXT TACROLIMUS LVL: 4.5
EXT WBC: 5.6

## 2020-12-18 ENCOUNTER — TELEPHONE (OUTPATIENT)
Dept: TRANSPLANT | Facility: CLINIC | Age: 65
End: 2020-12-18

## 2020-12-18 NOTE — TELEPHONE ENCOUNTER
Patient reported he had gone to ER and was given Keyexelate and IV fluids and was sent home to take Keyexelate x 3 days, just finished last dose today. Seeing (nephrologist) on Monday , and will have labs on Tuesday, he said.

## 2020-12-18 NOTE — TELEPHONE ENCOUNTER
----- Message from Olga Gant sent at 12/18/2020  2:28 PM CST -----  Regarding: Advice  Contact: pt  Reason:Calling to speak with coordinator no other info provided    Communication:[phone]

## 2020-12-21 ENCOUNTER — TELEPHONE (OUTPATIENT)
Dept: TRANSPLANT | Facility: CLINIC | Age: 65
End: 2020-12-21

## 2020-12-21 NOTE — TELEPHONE ENCOUNTER
Returned patient call; he reported his wife tested positive for COVID 19- she is quaranteened in another part of the house away from patient, he said.  He is quaranteened until 1/4/21. He will be tested as per his local doctor corey.  He will call back when able to go to the lab , he said.  Will review with .

## 2020-12-21 NOTE — TELEPHONE ENCOUNTER
----- Message from Augusto High sent at 12/21/2020  8:43 AM CST -----  Regarding: Speak to coordinator  Contact: Pt  Pt would like to speak to coordinator to make her aware he has been exposed to COVID 19 (wife tested positive last Friday)  Pt states he has lab work today, but will not be able to attend due to exposure and would like suggestion about future lab date.    PT # [phone]

## 2020-12-24 NOTE — ASSESSMENT & PLAN NOTE
- acute on chronic  - infectious work up initiated on admission, blood and urine cultures 6/9 NGTD, off all antibiotics  - diagnostic para 6/10, no SBP  - aaox3, continue lactulose and rifaximin   No

## 2020-12-30 LAB
EXT ALBUMIN: 2.3
EXT ALKALINE PHOSPHATASE: 80
EXT ALT: 8
EXT AST: 10
EXT BILIRUBIN TOTAL: 0.2
EXT BUN: 45
EXT CALCIUM: 7.7
EXT CHLORIDE: 109
EXT CO2: 25
EXT CREATININE: 3.3 MG/DL
EXT GFR MDRD NON AF AMER: 19
EXT GLUCOSE: 110
EXT HEMATOCRIT: 30.1
EXT HEMOGLOBIN: 9.9
EXT MAGNESIUM: 2.5
EXT PLATELETS: 125
EXT POTASSIUM: 4.7
EXT PROTEIN TOTAL: 5.3
EXT SODIUM: 139 MMOL/L
EXT TACROLIMUS LVL: 3.8
EXT WBC: 4.3

## 2021-01-06 ENCOUNTER — PATIENT MESSAGE (OUTPATIENT)
Dept: TRANSPLANT | Facility: CLINIC | Age: 66
End: 2021-01-06

## 2021-01-06 ENCOUNTER — TELEPHONE (OUTPATIENT)
Dept: TRANSPLANT | Facility: CLINIC | Age: 66
End: 2021-01-06

## 2021-01-06 DIAGNOSIS — N18.30 STAGE 3 CHRONIC KIDNEY DISEASE, UNSPECIFIED WHETHER STAGE 3A OR 3B CKD: Primary | ICD-10-CM

## 2021-01-08 ENCOUNTER — LAB VISIT (OUTPATIENT)
Dept: LAB | Facility: HOSPITAL | Age: 66
End: 2021-01-08
Attending: INTERNAL MEDICINE
Payer: MEDICARE

## 2021-01-08 ENCOUNTER — OFFICE VISIT (OUTPATIENT)
Dept: NEPHROLOGY | Facility: CLINIC | Age: 66
End: 2021-01-08
Payer: MEDICARE

## 2021-01-08 ENCOUNTER — PATIENT MESSAGE (OUTPATIENT)
Dept: TRANSPLANT | Facility: CLINIC | Age: 66
End: 2021-01-08

## 2021-01-08 VITALS
SYSTOLIC BLOOD PRESSURE: 142 MMHG | BODY MASS INDEX: 33.44 KG/M2 | HEART RATE: 76 BPM | DIASTOLIC BLOOD PRESSURE: 70 MMHG | HEIGHT: 74 IN | OXYGEN SATURATION: 99 % | WEIGHT: 260.56 LBS

## 2021-01-08 DIAGNOSIS — I10 HTN (HYPERTENSION), BENIGN: ICD-10-CM

## 2021-01-08 DIAGNOSIS — N18.4 CHRONIC KIDNEY DISEASE (CKD), STAGE IV (SEVERE): Primary | ICD-10-CM

## 2021-01-08 DIAGNOSIS — N18.30 STAGE 3 CHRONIC KIDNEY DISEASE, UNSPECIFIED WHETHER STAGE 3A OR 3B CKD: ICD-10-CM

## 2021-01-08 LAB
ALBUMIN SERPL BCP-MCNC: 2.3 G/DL (ref 3.5–5.2)
ANION GAP SERPL CALC-SCNC: 7 MMOL/L (ref 8–16)
BASOPHILS # BLD AUTO: 0.03 K/UL (ref 0–0.2)
BASOPHILS NFR BLD: 0.6 % (ref 0–1.9)
BUN SERPL-MCNC: 41 MG/DL (ref 8–23)
CALCIUM SERPL-MCNC: 7.9 MG/DL (ref 8.7–10.5)
CHLORIDE SERPL-SCNC: 106 MMOL/L (ref 95–110)
CO2 SERPL-SCNC: 24 MMOL/L (ref 23–29)
CREAT SERPL-MCNC: 3.3 MG/DL (ref 0.5–1.4)
DIFFERENTIAL METHOD: ABNORMAL
EOSINOPHIL # BLD AUTO: 0.1 K/UL (ref 0–0.5)
EOSINOPHIL NFR BLD: 3 % (ref 0–8)
ERYTHROCYTE [DISTWIDTH] IN BLOOD BY AUTOMATED COUNT: 14.7 % (ref 11.5–14.5)
EST. GFR  (AFRICAN AMERICAN): 22 ML/MIN/1.73 M^2
EST. GFR  (NON AFRICAN AMERICAN): 19 ML/MIN/1.73 M^2
GLUCOSE SERPL-MCNC: 110 MG/DL (ref 70–110)
HCT VFR BLD AUTO: 28.7 % (ref 40–54)
HGB BLD-MCNC: 9.5 G/DL (ref 14–18)
IMM GRANULOCYTES # BLD AUTO: 0.05 K/UL (ref 0–0.04)
IMM GRANULOCYTES NFR BLD AUTO: 1.1 % (ref 0–0.5)
LYMPHOCYTES # BLD AUTO: 0.6 K/UL (ref 1–4.8)
LYMPHOCYTES NFR BLD: 13.5 % (ref 18–48)
MCH RBC QN AUTO: 28 PG (ref 27–31)
MCHC RBC AUTO-ENTMCNC: 33.1 G/DL (ref 32–36)
MCV RBC AUTO: 85 FL (ref 82–98)
MONOCYTES # BLD AUTO: 0.6 K/UL (ref 0.3–1)
MONOCYTES NFR BLD: 11.8 % (ref 4–15)
NEUTROPHILS # BLD AUTO: 3.3 K/UL (ref 1.8–7.7)
NEUTROPHILS NFR BLD: 70 % (ref 38–73)
NRBC BLD-RTO: 0 /100 WBC
PHOSPHATE SERPL-MCNC: 4.2 MG/DL (ref 2.7–4.5)
PLATELET # BLD AUTO: 138 K/UL (ref 150–350)
PMV BLD AUTO: 9.8 FL (ref 9.2–12.9)
POTASSIUM SERPL-SCNC: 4.1 MMOL/L (ref 3.5–5.1)
PTH-INTACT SERPL-MCNC: 236.2 PG/ML (ref 9–77)
RBC # BLD AUTO: 3.39 M/UL (ref 4.6–6.2)
SODIUM SERPL-SCNC: 137 MMOL/L (ref 136–145)
WBC # BLD AUTO: 4.65 K/UL (ref 3.9–12.7)

## 2021-01-08 PROCEDURE — 83970 ASSAY OF PARATHORMONE: CPT | Mod: 91

## 2021-01-08 PROCEDURE — 99214 OFFICE O/P EST MOD 30 MIN: CPT | Mod: S$PBB,,, | Performed by: INTERNAL MEDICINE

## 2021-01-08 PROCEDURE — 80069 RENAL FUNCTION PANEL: CPT

## 2021-01-08 PROCEDURE — 99999 PR PBB SHADOW E&M-EST. PATIENT-LVL III: CPT | Mod: PBBFAC,,, | Performed by: INTERNAL MEDICINE

## 2021-01-08 PROCEDURE — 99213 OFFICE O/P EST LOW 20 MIN: CPT | Mod: PBBFAC | Performed by: INTERNAL MEDICINE

## 2021-01-08 PROCEDURE — 99214 PR OFFICE/OUTPT VISIT, EST, LEVL IV, 30-39 MIN: ICD-10-PCS | Mod: S$PBB,,, | Performed by: INTERNAL MEDICINE

## 2021-01-08 PROCEDURE — 83970 ASSAY OF PARATHORMONE: CPT

## 2021-01-08 PROCEDURE — 99999 PR PBB SHADOW E&M-EST. PATIENT-LVL III: ICD-10-PCS | Mod: PBBFAC,,, | Performed by: INTERNAL MEDICINE

## 2021-01-08 PROCEDURE — 85025 COMPLETE CBC W/AUTO DIFF WBC: CPT

## 2021-01-08 PROCEDURE — 36415 COLL VENOUS BLD VENIPUNCTURE: CPT

## 2021-01-08 RX ORDER — FERROUS SULFATE 325(65) MG
1 TABLET ORAL DAILY
COMMUNITY
Start: 2020-12-11 | End: 2022-01-01

## 2021-01-08 RX ORDER — ERGOCALCIFEROL 1.25 MG/1
1 CAPSULE ORAL WEEKLY
COMMUNITY
Start: 2020-12-14 | End: 2022-01-01

## 2021-01-09 ENCOUNTER — PATIENT MESSAGE (OUTPATIENT)
Dept: NEPHROLOGY | Facility: HOSPITAL | Age: 66
End: 2021-01-09

## 2021-01-11 ENCOUNTER — TELEPHONE (OUTPATIENT)
Dept: TRANSPLANT | Facility: CLINIC | Age: 66
End: 2021-01-11

## 2021-01-11 LAB — EXT MAGNESIUM: 1.8

## 2021-01-13 ENCOUNTER — OFFICE VISIT (OUTPATIENT)
Dept: HEPATOLOGY | Facility: CLINIC | Age: 66
End: 2021-01-13
Payer: MEDICARE

## 2021-01-13 ENCOUNTER — HOSPITAL ENCOUNTER (OUTPATIENT)
Dept: RADIOLOGY | Facility: HOSPITAL | Age: 66
Discharge: HOME | End: 2021-01-13
Attending: INTERNAL MEDICINE
Payer: MEDICARE

## 2021-01-13 VITALS
DIASTOLIC BLOOD PRESSURE: 90 MMHG | HEART RATE: 80 BPM | SYSTOLIC BLOOD PRESSURE: 150 MMHG | HEIGHT: 74 IN | WEIGHT: 262.81 LBS | BODY MASS INDEX: 33.73 KG/M2

## 2021-01-13 DIAGNOSIS — N18.4 CKD (CHRONIC KIDNEY DISEASE) STAGE 4, GFR 15-29 ML/MIN: ICD-10-CM

## 2021-01-13 DIAGNOSIS — R06.02 SOB (SHORTNESS OF BREATH): ICD-10-CM

## 2021-01-13 DIAGNOSIS — D84.9 IMMUNOSUPPRESSION: ICD-10-CM

## 2021-01-13 DIAGNOSIS — Z94.4 LIVER TRANSPLANTED: ICD-10-CM

## 2021-01-13 DIAGNOSIS — R06.02 SOB (SHORTNESS OF BREATH): Primary | ICD-10-CM

## 2021-01-13 PROCEDURE — 71046 X-RAY EXAM CHEST 2 VIEWS: CPT | Mod: 26,NTX,, | Performed by: RADIOLOGY

## 2021-01-13 PROCEDURE — 99214 PR OFFICE/OUTPT VISIT, EST, LEVL IV, 30-39 MIN: ICD-10-PCS | Mod: S$PBB,,, | Performed by: INTERNAL MEDICINE

## 2021-01-13 PROCEDURE — 99214 OFFICE O/P EST MOD 30 MIN: CPT | Mod: S$PBB,,, | Performed by: INTERNAL MEDICINE

## 2021-01-13 PROCEDURE — 71046 X-RAY EXAM CHEST 2 VIEWS: CPT | Mod: TC,TXP

## 2021-01-13 PROCEDURE — 99999 PR PBB SHADOW E&M-EST. PATIENT-LVL IV: ICD-10-PCS | Mod: PBBFAC,,, | Performed by: INTERNAL MEDICINE

## 2021-01-13 PROCEDURE — 71046 XR CHEST PA AND LATERAL: ICD-10-PCS | Mod: 26,NTX,, | Performed by: RADIOLOGY

## 2021-01-13 PROCEDURE — 99214 OFFICE O/P EST MOD 30 MIN: CPT | Mod: PBBFAC,25 | Performed by: INTERNAL MEDICINE

## 2021-01-13 PROCEDURE — 99999 PR PBB SHADOW E&M-EST. PATIENT-LVL IV: CPT | Mod: PBBFAC,,, | Performed by: INTERNAL MEDICINE

## 2021-01-13 RX ORDER — AMLODIPINE BESYLATE 2.5 MG/1
2.5 TABLET ORAL DAILY
COMMUNITY
Start: 2021-01-12 | End: 2022-01-01

## 2021-01-15 ENCOUNTER — PATIENT MESSAGE (OUTPATIENT)
Dept: HEPATOLOGY | Facility: CLINIC | Age: 66
End: 2021-01-15

## 2021-01-26 ENCOUNTER — TELEPHONE (OUTPATIENT)
Dept: PULMONOLOGY | Facility: CLINIC | Age: 66
End: 2021-01-26

## 2021-01-28 ENCOUNTER — PATIENT MESSAGE (OUTPATIENT)
Dept: HEPATOLOGY | Facility: CLINIC | Age: 66
End: 2021-01-28

## 2021-01-28 DIAGNOSIS — Z76.82 ORGAN TRANSPLANT CANDIDATE: Primary | ICD-10-CM

## 2021-01-29 DIAGNOSIS — Z76.82 ORGAN TRANSPLANT CANDIDATE: Primary | ICD-10-CM

## 2021-02-01 ENCOUNTER — PATIENT MESSAGE (OUTPATIENT)
Dept: HEPATOLOGY | Facility: CLINIC | Age: 66
End: 2021-02-01

## 2021-02-02 LAB
EXT ALBUMIN: 2.7
EXT ALKALINE PHOSPHATASE: 93
EXT ALT: 8
EXT AST: 7
EXT BILIRUBIN TOTAL: 0.3
EXT BUN: 43
EXT CALCIUM: 7.3
EXT CHLORIDE: 104
EXT CO2: 28
EXT CREATININE: 3.7 MG/DL
EXT GFR MDRD NON AF AMER: 17
EXT GLUCOSE: 123
EXT HEMATOCRIT: 29.7
EXT HEMOGLOBIN: 10.3
EXT MAGNESIUM: 1.5
EXT PLATELETS: 134
EXT POTASSIUM: 3.3
EXT PROTEIN TOTAL: 5.8
EXT SODIUM: 141 MMOL/L
EXT TACROLIMUS LVL: 4.3
EXT WBC: 6.2

## 2021-02-03 ENCOUNTER — TELEPHONE (OUTPATIENT)
Dept: TRANSPLANT | Facility: CLINIC | Age: 66
End: 2021-02-03

## 2021-02-08 ENCOUNTER — TELEPHONE (OUTPATIENT)
Dept: TRANSPLANT | Facility: CLINIC | Age: 66
End: 2021-02-08

## 2021-02-10 DIAGNOSIS — Z76.82 ORGAN TRANSPLANT CANDIDATE: Primary | ICD-10-CM

## 2021-02-11 ENCOUNTER — TELEPHONE (OUTPATIENT)
Dept: TRANSPLANT | Facility: CLINIC | Age: 66
End: 2021-02-11

## 2021-02-11 DIAGNOSIS — R82.998 HYPOMAGNESURIA: Primary | ICD-10-CM

## 2021-02-11 DIAGNOSIS — Z94.4 LIVER REPLACED BY TRANSPLANT: ICD-10-CM

## 2021-02-22 NOTE — PROGRESS NOTES
Attempted to contact patient.  Message left on voicemail to schedule follow up [facility]  Liver Transplant  Progress Note    Patient Name: Alon Adamson  MRN: [id number]  Admission Date: 6/22/2018  Hospital Length of Stay: 14 days  Code Status: Full Code  Primary Care Provider: Mckinley Vides MD    Subjective:     History of Present Illness:  Mr. 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  Complications of liver disease include hyperbilirubinemia, hypoalbuminemia, severe malnutrition,   hepatic encephalopathy, thrombocytopenia, splenomegaly, ascites, hypervolemia, coagulopathy, portal HTN.  Pt recently admitted for severe hepatic encephalopathy requiring intubation but has been stable from mentation standpoint post resolution of acute enceophalopathy. Mr. Adamson was seen in clinic 6/22 and was noted to have significant hypervolemia, weeping from BLE, and VICKY on CKD3 on routine labs.  His MELD increased from 22 to 26 per labs- coordinator notified and MELD updated.  Cr elevated to 2.3 from baseline of 1.4.  Also with worsening ascites.  Pt reported increased pruritis, generalized fatigue and weakness.  He presented in wheelchair.  ROS otherwise negative.      Hospital Course:  Patient is listed for liver transplant, placed on internal hold 6/25 for HE then status 7 on 6/26 due to change in prescription coverage. He was re-activated on the list 7/3, MELD 22. Pt started on IV diuretics 6/22 and continued 6/23 and 6/24.  BLE edema and ascites signif improved with diuresis and kidney function also improved.  Pt noted to have increased asterixis  6/24. Upon admission, he was noted to have grade 1 hepatic encephalopathy which remained until 6/24 when grade 1-2 HE was noted.  Lactulose increased until pt had a BM- he had not had BM during previous day. On 6/25 AM, pt with signif worsened encephalopathy opening eyes to verbal and tactile stimuli only.  Pt given lactulose enemas, infx workup sent, started on IV abx (ceftriaxone, vanc), hydrated with  albumin, diuretics discontinued, CT head obtained and negative. Paracentesis 6/25 negative for peritonitis per cell count and U/A negative. Pt also with concern for BLE cellulitis- upper legs bright red with lower BLE still with dark appearance of venous stasis. Placed on vancomycin with improvement in BLE redness 6/26. Vanc continued for 7 days (ended 7/1). His HE waxed and waned for several days requiring lactulose enemas. Pt eating minimally during period of encephalopathy requiring gentle hydration with IVF 6/26. Of note, taco placement attempted 6/26 but unable secondary to agitation upon insertion prompting self resolving nose bleed. Micro lab called 6/26 PM with blood cx + for G+C in blood- pt continued on vanc which was started 6/25. ID consulted, suspect + blood cultures contaminant. EEG obtained 6/26 as pt with h/o serizures and negative. Pt continued on home Keppra. VICKY on admission initially improved with diuresis, but Cr sakina intermittently during admission likely related to aggressive diuresis. Of note, with chest pain overnight 6/26-6/27 that resolved without intervention and pt reported as mild.  EKG NSR with PVC, troponin 0.1 in setting of hypervolemia + VICKY.  Normal dobutamine stress 1/2018.  Cardiology consulted and felt not ACS, no need for further workup other than continue to treat current risk factors for CAD with ASA (pt already on ASA and statin as outpt) + BB for portal hypertension which was started.     Interval History: no acute events overnight. Grade I HE, AAOx4, mental status improved from last few days. Continues to work with PT/OT, walker at bedside, encourage OOB, sat in chair for awhile today. Appetite remains tenuous, drinking protein shakes TID and protein powder. Still with some nausea, possibly related to ascites, likely will need para next week. Cr remains elevated at 2.1, holding diuretics, albumin x 1 today. Increase bicarb supplement, urine lytes ordered.     Scheduled  Meds:   aspirin  81 mg Oral Daily    ergocalciferol  50,000 Units Oral Q7 Days    fluconazole  200 mg Oral Daily    heparin (porcine)  5,000 Units Subcutaneous Q8H    lactulose  45 g Oral TID    levETIRAcetam  500 mg Oral BID    lidocaine  2 patch Transdermal Q24H    lipase-protease-amylase 12,000-38,000-60,000 units  2 capsule Oral TID WM    omega-3 acid ethyl esters  2 g Oral Daily with breakfast    pantoprazole  40 mg Oral BID    promethazine (PHENERGAN) IVPB  12.5 mg Intravenous Once    propranolol  5 mg Oral BID    rifAXImin  550 mg Oral BID    sertraline  50 mg Oral Daily    sodium bicarbonate  1,300 mg Oral TID     Continuous Infusions:  PRN Meds:acetaminophen, diphenhydrAMINE-zinc acetate 1-0.1%, lactulose, lactulose, metoclopramide HCl, ondansetron, sodium chloride 0.9%    Review of Systems   Constitutional: Positive for appetite change (wife reports, patient does not much) and fatigue. Negative for activity change, chills and fever.   HENT: Negative for mouth sores, sore throat and trouble swallowing.    Eyes: Negative for visual disturbance.   Respiratory: Positive for shortness of breath (upon activity).    Cardiovascular: Positive for leg swelling. Negative for chest pain.   Endocrine: Negative for polydipsia and polyuria.   Genitourinary: Negative for decreased urine volume and difficulty urinating.   Musculoskeletal: Positive for arthralgias (both shoulders have been hurting for last 2 weeks, right > left).   Skin: Negative for rash.   Neurological: Positive for weakness. Negative for dizziness, syncope and headaches.   Hematological: Bruises/bleeds easily.   Psychiatric/Behavioral: Negative for agitation, confusion, hallucinations and sleep disturbance.     Objective:     Vital Signs (Most Recent):  Temp: 98 °F (36.7 °C) (07/06/18 0759)  Pulse: 67 (07/06/18 0759)  Resp: 18 (07/06/18 0759)  BP: 127/60 (07/06/18 0759)  SpO2: 97 % (07/06/18 0759) Vital Signs (24h Range):  Temp:  [97.9 °F  (36.6 °C)-98.2 °F (36.8 °C)] 98 °F (36.7 °C)  Pulse:  [61-71] 67  Resp:  [15-20] 18  SpO2:  [97 %-98 %] 97 %  BP: (101-132)/(51-66) 127/60     Weight: 135 kg (297 lb 9.9 oz)  Body mass index is 38.21 kg/m².    Intake/Output - Last 3 Shifts       07/04 0700 - 07/05 0659 07/05 0700 - 07/06 0659 07/06 0700 - 07/07 0659    P.O. 1640 1500     Total Intake(mL/kg) 1640 (11.7) 1500 (11.1)     Urine (mL/kg/hr) 200 (0.1) 450 (0.1)     Emesis/NG output 0 (0)      Other 0 (0)      Stool 0 (0)      Blood 0 (0)      Total Output 200 450      Net +1440 +1050             Urine Occurrence 5 x 4 x     Stool Occurrence 3 x 5 x     Emesis Occurrence 2 x 2 x           Physical Exam   Constitutional: He is oriented to person, place, and time. He appears well-developed and well-nourished.   HENT:   Head: Normocephalic and atraumatic.   Mouth/Throat: No oropharyngeal exudate.   Eyes: EOM are normal. Pupils are equal, round, and reactive to light. Scleral icterus is present.   Neck: No JVD present.   Cardiovascular: Exam reveals no gallop and no friction rub.    Murmur heard.  Pulmonary/Chest: Effort normal and breath sounds normal. No stridor. No respiratory distress. He has no wheezes. He has no rales.   Abdominal: Soft. Bowel sounds are normal. He exhibits distension. He exhibits no mass.   Musculoskeletal: He exhibits edema (+2/+3 BLE). He exhibits no tenderness.   Lymphadenopathy:     He has no cervical adenopathy.   Neurological: He is alert and oriented to person, place, and time.   Skin: Skin is warm and dry. No rash noted. No erythema.   Psychiatric: He has a normal mood and affect. His behavior is normal. His speech is delayed.       Laboratory:  Immunosuppressants     None        CBC:   Recent Labs  Lab 07/06/18  0538   WBC 2.98*   RBC 2.87*   HGB 9.9*   HCT 30.9*   PLT 48*   *   MCH 34.5*   MCHC 32.0     CMP:   Recent Labs  Lab 07/06/18  0537   *   CALCIUM 9.6   ALBUMIN 3.2*   PROT 5.5*      K 4.2   CO2 15*  "  *   BUN 37*   CREATININE 2.1*   ALKPHOS 96   ALT 45*   AST 69*   BILITOT 2.6*     Coagulation:   Recent Labs  Lab 07/06/18  0538   INR 1.5*     Labs within the past 24 hours have been reviewed.    Diagnostic Results:  I have personally reviewed all pertinent imaging studies.    Assessment/Plan:     * Acute kidney injury superimposed on chronic kidney disease    - VICKY initially improving with albumin infusion but with minimal intake 6/25 and 6/26.  - Ur Na: 32, unlikely for HRS. Repeat urine lytes 7/6.  - Bicarb 17, start Na Bicarb 1300 mg TID  - Cr remains elevated 2.1, holding diuretics (previously lasix 40mg daily). Albumin x 1.         Decompensated hepatic cirrhosis    - Placed status 7 on 6/26 for prescription drug coverage change. Re-activated 7/3, MELD 22. Listing tenuous due to frailty and malnutrition.    - propanolol decreased to 5mg BID, monitor BP closely    MELD-Na score: 22 at 7/6/2018  5:38 AM  MELD score: 22 at 7/6/2018  5:38 AM  Calculated from:  Serum Creatinine: 2.1 mg/dL at 7/6/2018  5:37 AM  Serum Sodium: 140 mmol/L (Rounded to 137) at 7/6/2018  5:37 AM  Total Bilirubin: 2.6 mg/dL at 7/6/2018  5:37 AM  INR(ratio): 1.5 at 7/6/2018  5:38 AM  Age: 63 years        Hepatic encephalopathy    - Acute on chronic  - improved from grade 3 to 1-2 with lactulose enemas 6/25 + 6/26, followed by oral lactulose, xifaxan.  - continue rifaximin 550 mg BID.  - infx workup initiated: blood/urine/ascites. Started on cefepime/vanc, d/c'd 7/1. Blood Cultures 6/27 are neg to date.   - adjusting lactulose regimen to prevent acute HE. PRN lactulose ordered in addition to scheduled dosing.   - cannot skip lactulose, easily becomes encephalopathic        Hypoalbuminemia due to protein-calorie malnutrition    - Dietary consulted for calorie count.  - Pt eating poorly accord to wife.  Does take boost supplement.  - "pre-hab" regimen and supplements ordered.   - Patient drinking at least 2-3 boost supplements, " protein shake, and benoprotein daily.   - If continues to have low intake, consider starting Megace.         Dermatitis associated with moisture              Physical deconditioning    - PT/OT consulted.  - encourage OOBTC. Walking in halls with walker.         Ascites due to alcoholic cirrhosis    - Para 6/25 negative for infection.  - abd distended, will likely need para next week (7/9)        Coagulopathy    - Secondary to decompensated liver disease.        CKD (chronic kidney disease), stage III    - see acute kidney injury superimposed on CKD III        Cholestatic pruritus    - cholestyramine has previously been ineffective, given depressed mood; will initiate zoloft for mood and pruritus.        Pancytopenia    - related to decompensated liver disease        Coronary artery disease involving native coronary artery of native heart without angina pectoris    - Continue home ASA.        Anemia of chronic disease    - H&H stable.  - 1 unit PRBC transfused 6/28.         Thrombocytopenia    - Secondary to splenomegaly from portal HTN- should improve with txp.  - No s/s overt bleed.        Seizures    - Oral Keppra transitioned to IV as pt not able to swallow 6/26.  - Resumed oral Keppra since mentation improved.  - EEG 6/26 negative.        Bilateral edema of lower extremity    - completed vanc x 7 days for BLE cellulitis  - Cr elevated but stable.   - edema improved with gentle diuresis and albumin PRN, holding diuretics for elevated Cr            VTE Risk Mitigation         Ordered     heparin (porcine) injection 5,000 Units  Every 8 hours      06/22/18 1502     IP VTE HIGH RISK PATIENT  Once      06/22/18 1502          The patients clinical status was discussed at multidisplinary rounds, involving transplant surgery, transplant medicine, pharmacy, nursing, nutrition, and social work    Discharge Planning: not stable for discharge at this time. Discharge pending improvement in mobility, nutrition, and mental  status.     Rissa Regalado, BHARGAVI  Liver Transplant  [facility]

## 2021-03-03 ENCOUNTER — HOSPITAL ENCOUNTER (OUTPATIENT)
Dept: RADIOLOGY | Facility: HOSPITAL | Age: 66
Discharge: HOME | End: 2021-03-03
Attending: NURSE PRACTITIONER
Payer: MEDICARE

## 2021-03-03 ENCOUNTER — OFFICE VISIT (OUTPATIENT)
Dept: TRANSPLANT | Facility: CLINIC | Age: 66
End: 2021-03-03
Payer: MEDICARE

## 2021-03-03 VITALS
HEART RATE: 92 BPM | HEIGHT: 74 IN | SYSTOLIC BLOOD PRESSURE: 134 MMHG | RESPIRATION RATE: 16 BRPM | BODY MASS INDEX: 31.43 KG/M2 | TEMPERATURE: 98 F | WEIGHT: 244.94 LBS | DIASTOLIC BLOOD PRESSURE: 78 MMHG | OXYGEN SATURATION: 100 %

## 2021-03-03 DIAGNOSIS — N18.4 TYPE 2 DIABETES MELLITUS WITH STAGE 4 CHRONIC KIDNEY DISEASE, WITHOUT LONG-TERM CURRENT USE OF INSULIN: ICD-10-CM

## 2021-03-03 DIAGNOSIS — Z76.82 ORGAN TRANSPLANT CANDIDATE: ICD-10-CM

## 2021-03-03 DIAGNOSIS — E11.22 TYPE 2 DIABETES MELLITUS WITH STAGE 4 CHRONIC KIDNEY DISEASE, WITHOUT LONG-TERM CURRENT USE OF INSULIN: ICD-10-CM

## 2021-03-03 DIAGNOSIS — I10 ESSENTIAL HYPERTENSION: ICD-10-CM

## 2021-03-03 DIAGNOSIS — Z29.89 PROPHYLACTIC IMMUNOTHERAPY: ICD-10-CM

## 2021-03-03 DIAGNOSIS — Z94.4 LIVER TRANSPLANTED: Primary | ICD-10-CM

## 2021-03-03 DIAGNOSIS — I25.10 CORONARY ARTERY DISEASE INVOLVING NATIVE CORONARY ARTERY OF NATIVE HEART WITHOUT ANGINA PECTORIS: ICD-10-CM

## 2021-03-03 DIAGNOSIS — N18.4 CKD (CHRONIC KIDNEY DISEASE) STAGE 4, GFR 15-29 ML/MIN: ICD-10-CM

## 2021-03-03 PROCEDURE — 93978 VASCULAR STUDY: CPT | Mod: TC,TXP

## 2021-03-03 PROCEDURE — 76770 US RETROPERITONEAL COMPLETE: ICD-10-PCS | Mod: 26,TXP,, | Performed by: RADIOLOGY

## 2021-03-03 PROCEDURE — 72170 XR PELVIS ROUTINE AP: ICD-10-PCS | Mod: 26,TXP,, | Performed by: RADIOLOGY

## 2021-03-03 PROCEDURE — 93978 VASCULAR STUDY: CPT | Mod: 26,TXP,, | Performed by: RADIOLOGY

## 2021-03-03 PROCEDURE — 76705 US LIVER TRANSPLANT POST: ICD-10-PCS | Mod: 26,TXP,, | Performed by: RADIOLOGY

## 2021-03-03 PROCEDURE — 99999 PR PBB SHADOW E&M-EST. PATIENT-LVL IV: CPT | Mod: PBBFAC,TXP,, | Performed by: NURSE PRACTITIONER

## 2021-03-03 PROCEDURE — 76705 ECHO EXAM OF ABDOMEN: CPT | Mod: 26,TXP,, | Performed by: RADIOLOGY

## 2021-03-03 PROCEDURE — 93978 US DOPP ILIACS BILATERAL: ICD-10-PCS | Mod: 26,TXP,, | Performed by: RADIOLOGY

## 2021-03-03 PROCEDURE — 71046 XR CHEST PA AND LATERAL: ICD-10-PCS | Mod: 26,TXP,, | Performed by: RADIOLOGY

## 2021-03-03 PROCEDURE — 99203 PR OFFICE/OUTPT VISIT, NEW, LEVL III, 30-44 MIN: ICD-10-PCS | Mod: S$PBB,TXP,, | Performed by: TRANSPLANT SURGERY

## 2021-03-03 PROCEDURE — 76770 US EXAM ABDO BACK WALL COMP: CPT | Mod: TC,TXP

## 2021-03-03 PROCEDURE — 93975 US LIVER TRANSPLANT POST: ICD-10-PCS | Mod: 26,TXP,, | Performed by: RADIOLOGY

## 2021-03-03 PROCEDURE — 93975 VASCULAR STUDY: CPT | Mod: 26,TXP,, | Performed by: RADIOLOGY

## 2021-03-03 PROCEDURE — 99205 OFFICE O/P NEW HI 60 MIN: CPT | Mod: S$PBB,TXP,, | Performed by: NURSE PRACTITIONER

## 2021-03-03 PROCEDURE — 72170 X-RAY EXAM OF PELVIS: CPT | Mod: 26,TXP,, | Performed by: RADIOLOGY

## 2021-03-03 PROCEDURE — 99214 OFFICE O/P EST MOD 30 MIN: CPT | Mod: PBBFAC,25,TXP | Performed by: NURSE PRACTITIONER

## 2021-03-03 PROCEDURE — 93975 VASCULAR STUDY: CPT | Mod: TC,TXP

## 2021-03-03 PROCEDURE — 99999 PR PBB SHADOW E&M-EST. PATIENT-LVL IV: ICD-10-PCS | Mod: PBBFAC,TXP,, | Performed by: NURSE PRACTITIONER

## 2021-03-03 PROCEDURE — 71046 X-RAY EXAM CHEST 2 VIEWS: CPT | Mod: TC,TXP

## 2021-03-03 PROCEDURE — 71046 X-RAY EXAM CHEST 2 VIEWS: CPT | Mod: 26,TXP,, | Performed by: RADIOLOGY

## 2021-03-03 PROCEDURE — 72170 X-RAY EXAM OF PELVIS: CPT | Mod: TC,TXP

## 2021-03-03 PROCEDURE — 76770 US EXAM ABDO BACK WALL COMP: CPT | Mod: 26,TXP,, | Performed by: RADIOLOGY

## 2021-03-03 PROCEDURE — 99203 OFFICE O/P NEW LOW 30 MIN: CPT | Mod: S$PBB,TXP,, | Performed by: TRANSPLANT SURGERY

## 2021-03-03 PROCEDURE — 99205 PR OFFICE/OUTPT VISIT, NEW, LEVL V, 60-74 MIN: ICD-10-PCS | Mod: S$PBB,TXP,, | Performed by: NURSE PRACTITIONER

## 2021-03-03 PROCEDURE — 97802 MEDICAL NUTRITION INDIV IN: CPT | Mod: PBBFAC,TXP | Performed by: DIETITIAN, REGISTERED

## 2021-03-12 ENCOUNTER — PATIENT MESSAGE (OUTPATIENT)
Dept: TRANSPLANT | Facility: CLINIC | Age: 66
End: 2021-03-12

## 2021-03-15 RX ORDER — LANOLIN ALCOHOL/MO/W.PET/CERES
400 CREAM (GRAM) TOPICAL DAILY
Qty: 30 TABLET | Refills: 1 | Status: SHIPPED | OUTPATIENT
Start: 2021-03-15 | End: 2022-01-01

## 2021-03-16 DIAGNOSIS — Z76.82 ORGAN TRANSPLANT CANDIDATE: Primary | ICD-10-CM

## 2021-03-17 ENCOUNTER — TELEPHONE (OUTPATIENT)
Dept: NEUROLOGY | Facility: CLINIC | Age: 66
End: 2021-03-17

## 2021-03-18 ENCOUNTER — TELEPHONE (OUTPATIENT)
Dept: TRANSPLANT | Facility: CLINIC | Age: 66
End: 2021-03-18

## 2021-03-18 ENCOUNTER — TELEPHONE (OUTPATIENT)
Dept: NEUROLOGY | Facility: CLINIC | Age: 66
End: 2021-03-18

## 2021-03-22 ENCOUNTER — TELEPHONE (OUTPATIENT)
Dept: ENDOSCOPY | Facility: HOSPITAL | Age: 66
End: 2021-03-22

## 2021-03-22 ENCOUNTER — TELEPHONE (OUTPATIENT)
Dept: TRANSPLANT | Facility: CLINIC | Age: 66
End: 2021-03-22

## 2021-03-22 ENCOUNTER — TELEPHONE (OUTPATIENT)
Dept: PULMONOLOGY | Facility: CLINIC | Age: 66
End: 2021-03-22

## 2021-03-22 DIAGNOSIS — J90 PLEURAL EFFUSION ON LEFT: Primary | ICD-10-CM

## 2021-03-22 LAB
EXT ALBUMIN: 2.9
EXT ALKALINE PHOSPHATASE: 72
EXT ALT: 8
EXT AST: 9
EXT BILIRUBIN TOTAL: 0.3
EXT BUN: 42
EXT CALCIUM: 8.3
EXT CHLORIDE: 110
EXT CO2: 27
EXT CREATININE: 3.6 MG/DL
EXT EOSINOPHIL%: 2.4
EXT GFR MDRD NON AF AMER: 17
EXT GLUCOSE: 101
EXT HEMATOCRIT: 26.8
EXT HEMOGLOBIN: 9.2
EXT LYMPH%: 11.7
EXT MAGNESIUM: 2.2
EXT MONOCYTES%: 10.7
EXT PLATELETS: 134
EXT POTASSIUM: 4.1
EXT PROTEIN TOTAL: 6.6
EXT SEGS%: 74.2
EXT SODIUM: 141 MMOL/L
EXT TACROLIMUS LVL: 4.7
EXT WBC: 4.6

## 2021-03-22 RX ORDER — SODIUM CHLORIDE 0.9 % (FLUSH) 0.9 %
10 SYRINGE (ML) INJECTION
Status: CANCELLED | OUTPATIENT
Start: 2021-03-22

## 2021-03-23 ENCOUNTER — TELEPHONE (OUTPATIENT)
Dept: TRANSPLANT | Facility: CLINIC | Age: 66
End: 2021-03-23

## 2021-03-23 DIAGNOSIS — Z76.82 ORGAN TRANSPLANT CANDIDATE: Primary | ICD-10-CM

## 2021-03-26 ENCOUNTER — PATIENT MESSAGE (OUTPATIENT)
Dept: TRANSPLANT | Facility: CLINIC | Age: 66
End: 2021-03-26

## 2021-03-26 ENCOUNTER — TELEPHONE (OUTPATIENT)
Dept: TRANSPLANT | Facility: CLINIC | Age: 66
End: 2021-03-26

## 2021-04-12 ENCOUNTER — TELEPHONE (OUTPATIENT)
Dept: TRANSPLANT | Facility: CLINIC | Age: 66
End: 2021-04-12

## 2021-04-14 ENCOUNTER — HOSPITAL ENCOUNTER (OUTPATIENT)
Facility: HOSPITAL | Age: 66
Discharge: HOME | End: 2021-04-14
Attending: INTERNAL MEDICINE | Admitting: INTERNAL MEDICINE
Payer: MEDICARE

## 2021-04-14 DIAGNOSIS — Z94.0 S/P KIDNEY TRANSPLANT: ICD-10-CM

## 2021-04-14 DIAGNOSIS — J90 PLEURAL EFFUSION ON LEFT: ICD-10-CM

## 2021-04-14 DIAGNOSIS — J90 PLEURAL EFFUSION: ICD-10-CM

## 2021-04-14 PROBLEM — Z94.4 STATUS POST LIVER TRANSPLANT: Status: ACTIVE | Noted: 2021-04-14

## 2021-04-14 LAB
APPEARANCE FLD: NORMAL
BODY FLD TYPE: NORMAL
COLOR FLD: NORMAL
LYMPHOCYTES NFR FLD MANUAL: 74 %
MONOS+MACROS NFR FLD MANUAL: 23 %
NEUTROPHILS NFR FLD MANUAL: 3 %
SARS-COV-2 RDRP RESP QL NAA+PROBE: NEGATIVE
WBC # FLD: 34 /CU MM

## 2021-04-14 PROCEDURE — 83615 LACTATE (LD) (LDH) ENZYME: CPT | Mod: TXP | Performed by: INTERNAL MEDICINE

## 2021-04-14 PROCEDURE — 88112 PR  CYTOPATH, CELL ENHANCE TECH: ICD-10-PCS | Mod: 26,NTX,, | Performed by: STUDENT IN AN ORGANIZED HEALTH CARE EDUCATION/TRAINING PROGRAM

## 2021-04-14 PROCEDURE — 84157 ASSAY OF PROTEIN OTHER: CPT | Mod: NTX | Performed by: INTERNAL MEDICINE

## 2021-04-14 PROCEDURE — 82945 GLUCOSE OTHER FLUID: CPT | Mod: NTX | Performed by: INTERNAL MEDICINE

## 2021-04-14 PROCEDURE — U0002 COVID-19 LAB TEST NON-CDC: HCPCS | Mod: NTX | Performed by: INTERNAL MEDICINE

## 2021-04-14 PROCEDURE — 87070 CULTURE OTHR SPECIMN AEROBIC: CPT | Mod: NTX | Performed by: INTERNAL MEDICINE

## 2021-04-14 PROCEDURE — 32555 ASPIRATE PLEURA W/ IMAGING: CPT | Mod: LT,TXP | Performed by: INTERNAL MEDICINE

## 2021-04-14 PROCEDURE — 32555 PR THORACEN W/IMAG GUIDANCE: ICD-10-PCS | Mod: 52,LT,NTX, | Performed by: INTERNAL MEDICINE

## 2021-04-14 PROCEDURE — 89051 BODY FLUID CELL COUNT: CPT | Mod: NTX | Performed by: INTERNAL MEDICINE

## 2021-04-14 PROCEDURE — 88305 TISSUE EXAM BY PATHOLOGIST: CPT | Mod: NTX | Performed by: STUDENT IN AN ORGANIZED HEALTH CARE EDUCATION/TRAINING PROGRAM

## 2021-04-14 PROCEDURE — 88112 CYTOPATH CELL ENHANCE TECH: CPT | Mod: TXP | Performed by: STUDENT IN AN ORGANIZED HEALTH CARE EDUCATION/TRAINING PROGRAM

## 2021-04-14 PROCEDURE — 87205 SMEAR GRAM STAIN: CPT | Mod: TXP | Performed by: INTERNAL MEDICINE

## 2021-04-14 PROCEDURE — 87102 FUNGUS ISOLATION CULTURE: CPT | Mod: TXP | Performed by: INTERNAL MEDICINE

## 2021-04-14 PROCEDURE — 82042 OTHER SOURCE ALBUMIN QUAN EA: CPT | Mod: TXP | Performed by: INTERNAL MEDICINE

## 2021-04-14 PROCEDURE — 88305 TISSUE EXAM BY PATHOLOGIST: ICD-10-PCS | Mod: 26,NTX,, | Performed by: STUDENT IN AN ORGANIZED HEALTH CARE EDUCATION/TRAINING PROGRAM

## 2021-04-14 PROCEDURE — 32555 ASPIRATE PLEURA W/ IMAGING: CPT | Mod: 52,LT,NTX, | Performed by: INTERNAL MEDICINE

## 2021-04-14 PROCEDURE — 88112 CYTOPATH CELL ENHANCE TECH: CPT | Mod: 26,NTX,, | Performed by: STUDENT IN AN ORGANIZED HEALTH CARE EDUCATION/TRAINING PROGRAM

## 2021-04-14 PROCEDURE — 88305 TISSUE EXAM BY PATHOLOGIST: CPT | Mod: 26,NTX,, | Performed by: STUDENT IN AN ORGANIZED HEALTH CARE EDUCATION/TRAINING PROGRAM

## 2021-04-14 RX ORDER — SODIUM CHLORIDE 0.9 % (FLUSH) 0.9 %
10 SYRINGE (ML) INJECTION
Status: DISCONTINUED | OUTPATIENT
Start: 2021-04-14 | End: 2021-04-14 | Stop reason: HOSPADM

## 2021-04-15 VITALS
BODY MASS INDEX: 31.57 KG/M2 | OXYGEN SATURATION: 98 % | RESPIRATION RATE: 18 BRPM | TEMPERATURE: 98 F | HEART RATE: 80 BPM | SYSTOLIC BLOOD PRESSURE: 142 MMHG | HEIGHT: 74 IN | DIASTOLIC BLOOD PRESSURE: 77 MMHG | WEIGHT: 246 LBS

## 2021-04-15 LAB
ALBUMIN FLD-MCNC: 1 G/DL
BODY FLUID SOURCE, LDH: NORMAL
GLUCOSE FLD-MCNC: 91 MG/DL
LDH FLD L TO P-CCNC: 150 U/L
PATH INTERP FLD-IMP: NORMAL
PROT FLD-MCNC: 1.6 G/DL
SPECIMEN SOURCE: NORMAL

## 2021-04-19 LAB
BACTERIA FLD AEROBE CULT: NO GROWTH
FINAL PATHOLOGIC DIAGNOSIS: NORMAL
GRAM STN SPEC: NORMAL
GRAM STN SPEC: NORMAL
Lab: NORMAL

## 2021-04-20 ENCOUNTER — TELEPHONE (OUTPATIENT)
Dept: TRANSPLANT | Facility: CLINIC | Age: 66
End: 2021-04-20

## 2021-04-22 ENCOUNTER — TELEPHONE (OUTPATIENT)
Dept: TRANSPLANT | Facility: CLINIC | Age: 66
End: 2021-04-22

## 2021-04-30 ENCOUNTER — TELEPHONE (OUTPATIENT)
Dept: TRANSPLANT | Facility: CLINIC | Age: 66
End: 2021-04-30

## 2021-05-03 ENCOUNTER — PATIENT MESSAGE (OUTPATIENT)
Dept: TRANSPLANT | Facility: CLINIC | Age: 66
End: 2021-05-03

## 2021-05-03 ENCOUNTER — TELEPHONE (OUTPATIENT)
Dept: TRANSPLANT | Facility: CLINIC | Age: 66
End: 2021-05-03

## 2021-05-03 LAB
EXT ALBUMIN: 3.2
EXT ALKALINE PHOSPHATASE: 71
EXT ALT: 7
EXT AST: 7
EXT BILIRUBIN TOTAL: 0.3
EXT BUN: 57
EXT CALCIUM: 8.4
EXT CHLORIDE: 105
EXT CO2: 27
EXT CREATININE: 4.6 MG/DL
EXT EOSINOPHIL%: 1.9
EXT GFR MDRD NON AF AMER: 13
EXT GLUCOSE: 107
EXT HEMATOCRIT: 24.4
EXT HEMOGLOBIN: 8.5
EXT LYMPH%: 11.6
EXT MAGNESIUM: 2.2
EXT MONOCYTES%: 10.7
EXT PLATELETS: 118
EXT POTASSIUM: 4.3
EXT PROGRAF LEVEL: 3.6
EXT PROTEIN TOTAL: 6.2
EXT SEGS%: 74.9
EXT SODIUM: 141 MMOL/L
EXT WBC: 4.9

## 2021-05-06 ENCOUNTER — TELEPHONE (OUTPATIENT)
Dept: TRANSPLANT | Facility: CLINIC | Age: 66
End: 2021-05-06

## 2021-05-10 ENCOUNTER — TELEPHONE (OUTPATIENT)
Dept: PULMONOLOGY | Facility: CLINIC | Age: 66
End: 2021-05-10

## 2021-05-12 ENCOUNTER — TELEPHONE (OUTPATIENT)
Dept: TRANSPLANT | Facility: CLINIC | Age: 66
End: 2021-05-12

## 2021-05-14 ENCOUNTER — TELEPHONE (OUTPATIENT)
Dept: TRANSPLANT | Facility: CLINIC | Age: 66
End: 2021-05-14

## 2021-05-14 ENCOUNTER — TELEPHONE (OUTPATIENT)
Dept: HEPATOLOGY | Facility: CLINIC | Age: 66
End: 2021-05-14

## 2021-05-18 LAB — FUNGUS SPEC CULT: NORMAL

## 2021-06-02 ENCOUNTER — TELEPHONE (OUTPATIENT)
Dept: TRANSPLANT | Facility: CLINIC | Age: 66
End: 2021-06-02

## 2021-06-04 ENCOUNTER — PROCEDURE VISIT (OUTPATIENT)
Dept: HEPATOLOGY | Facility: CLINIC | Age: 66
End: 2021-06-04
Attending: INTERNAL MEDICINE
Payer: MEDICARE

## 2021-06-04 DIAGNOSIS — Z94.4 LIVER TRANSPLANTED: ICD-10-CM

## 2021-06-09 ENCOUNTER — TELEPHONE (OUTPATIENT)
Dept: PULMONOLOGY | Facility: CLINIC | Age: 66
End: 2021-06-09

## 2021-06-09 ENCOUNTER — SOCIAL WORK (OUTPATIENT)
Dept: TRANSPLANT | Facility: CLINIC | Age: 66
End: 2021-06-09

## 2021-06-10 ENCOUNTER — TELEPHONE (OUTPATIENT)
Dept: PULMONOLOGY | Facility: CLINIC | Age: 66
End: 2021-06-10

## 2021-06-10 ENCOUNTER — TELEPHONE (OUTPATIENT)
Dept: TRANSPLANT | Facility: CLINIC | Age: 66
End: 2021-06-10

## 2021-06-11 ENCOUNTER — SOCIAL WORK (OUTPATIENT)
Dept: TRANSPLANT | Facility: CLINIC | Age: 66
End: 2021-06-11

## 2021-06-11 ENCOUNTER — PATIENT MESSAGE (OUTPATIENT)
Dept: HEPATOLOGY | Facility: CLINIC | Age: 66
End: 2021-06-11

## 2021-06-11 DIAGNOSIS — Z94.4 LIVER TRANSPLANTED: ICD-10-CM

## 2021-06-14 RX ORDER — TACROLIMUS 1 MG/1
CAPSULE ORAL
Qty: 90 CAPSULE | Refills: 11 | Status: SHIPPED | OUTPATIENT
Start: 2021-06-14 | End: 2022-01-01 | Stop reason: SDUPTHER

## 2021-06-22 ENCOUNTER — PROCEDURE VISIT (OUTPATIENT)
Dept: HEPATOLOGY | Facility: CLINIC | Age: 66
End: 2021-06-22
Attending: INTERNAL MEDICINE
Payer: MEDICARE

## 2021-06-22 ENCOUNTER — HOSPITAL ENCOUNTER (OUTPATIENT)
Dept: RADIOLOGY | Facility: HOSPITAL | Age: 66
Discharge: HOME | End: 2021-06-22
Attending: INTERNAL MEDICINE
Payer: MEDICARE

## 2021-06-22 VITALS — HEIGHT: 74 IN | BODY MASS INDEX: 31.69 KG/M2 | WEIGHT: 246.94 LBS

## 2021-06-22 DIAGNOSIS — J90 PLEURAL EFFUSION ON LEFT: ICD-10-CM

## 2021-06-22 DIAGNOSIS — Z98.890 S/P THORACENTESIS: ICD-10-CM

## 2021-06-22 DIAGNOSIS — Z94.4 LIVER TRANSPLANTED: ICD-10-CM

## 2021-06-22 PROCEDURE — 71046 X-RAY EXAM CHEST 2 VIEWS: CPT | Mod: TC,TXP

## 2021-06-22 PROCEDURE — 71046 X-RAY EXAM CHEST 2 VIEWS: CPT | Mod: 26,NTX,, | Performed by: RADIOLOGY

## 2021-06-22 PROCEDURE — 91200 LIVER ELASTOGRAPHY: CPT | Mod: PBBFAC,NTX | Performed by: NURSE PRACTITIONER

## 2021-06-22 PROCEDURE — 91200 PR LIVER ELASTOGRAPHY W/OUT IMAG W/INTERP & REPORT: ICD-10-PCS | Mod: 26,S$PBB,NTX, | Performed by: NURSE PRACTITIONER

## 2021-06-22 PROCEDURE — 71046 XR CHEST PA AND LATERAL: ICD-10-PCS | Mod: 26,NTX,, | Performed by: RADIOLOGY

## 2021-06-22 PROCEDURE — 91200 LIVER ELASTOGRAPHY: CPT | Mod: 26,S$PBB,NTX, | Performed by: NURSE PRACTITIONER

## 2021-06-24 ENCOUNTER — PATIENT MESSAGE (OUTPATIENT)
Dept: HEPATOLOGY | Facility: CLINIC | Age: 66
End: 2021-06-24

## 2021-06-25 ENCOUNTER — PATIENT MESSAGE (OUTPATIENT)
Dept: HEPATOLOGY | Facility: CLINIC | Age: 66
End: 2021-06-25

## 2021-07-01 ENCOUNTER — TELEPHONE (OUTPATIENT)
Dept: TRANSPLANT | Facility: CLINIC | Age: 66
End: 2021-07-01

## 2021-07-01 ENCOUNTER — TELEPHONE (OUTPATIENT)
Dept: HEPATOLOGY | Facility: CLINIC | Age: 66
End: 2021-07-01

## 2021-07-06 ENCOUNTER — PATIENT MESSAGE (OUTPATIENT)
Dept: TRANSPLANT | Facility: CLINIC | Age: 66
End: 2021-07-06

## 2021-07-06 ENCOUNTER — TELEPHONE (OUTPATIENT)
Dept: GASTROENTEROLOGY | Facility: CLINIC | Age: 66
End: 2021-07-06

## 2021-07-06 LAB
EXT ALBUMIN: 3.3
EXT ALKALINE PHOSPHATASE: 70
EXT ALT: 11
EXT AST: 5
EXT BILIRUBIN TOTAL: 0.3
EXT BUN: 69
EXT CALCIUM: 8.8
EXT CHLORIDE: 111
EXT CO2: 23
EXT CREATININE: 5.8 MG/DL
EXT EOSINOPHIL%: 4.4
EXT GFR MDRD NON AF AMER: 10
EXT GLUCOSE: 93
EXT HEMATOCRIT: 25.4
EXT HEMOGLOBIN: 8.6
EXT LYMPH%: 13
EXT MAGNESIUM: 2.5
EXT MONOCYTES%: 10.5
EXT PLATELETS: 124
EXT POTASSIUM: 5.5
EXT PROTEIN TOTAL: 7.1
EXT SEGS%: 71.5
EXT SODIUM: 141 MMOL/L
EXT TACROLIMUS LVL: 2.4
EXT WBC: 4.1

## 2021-07-22 NOTE — ASSESSMENT & PLAN NOTE
- Post transplant required blood transfusion 8/1 for H&H 6.6/20.3; 8/2 for H&H 6.4/19.7.  - Epo started on 8/3.  - PRBC 1 unit last transfused 8/5.  - H&H remains stable.   Itraconazole Pregnancy And Lactation Text: This medication is Pregnancy Category C and it isn't know if it is safe during pregnancy. It is also excreted in breast milk.

## 2021-07-23 ENCOUNTER — TELEPHONE (OUTPATIENT)
Dept: TRANSPLANT | Facility: CLINIC | Age: 66
End: 2021-07-23

## 2021-07-28 ENCOUNTER — OFFICE VISIT (OUTPATIENT)
Dept: HEPATOLOGY | Facility: CLINIC | Age: 66
End: 2021-07-28
Payer: MEDICARE

## 2021-07-28 VITALS — WEIGHT: 246.69 LBS | BODY MASS INDEX: 33.41 KG/M2 | HEIGHT: 72 IN

## 2021-07-28 DIAGNOSIS — N18.5 CKD (CHRONIC KIDNEY DISEASE) STAGE 5, GFR LESS THAN 15 ML/MIN: ICD-10-CM

## 2021-07-28 DIAGNOSIS — Z94.4 LIVER TRANSPLANTED: ICD-10-CM

## 2021-07-28 DIAGNOSIS — D84.9 IMMUNOSUPPRESSION: Primary | ICD-10-CM

## 2021-07-28 PROCEDURE — 99213 OFFICE O/P EST LOW 20 MIN: CPT | Mod: S$PBB,NTX,, | Performed by: INTERNAL MEDICINE

## 2021-07-28 PROCEDURE — 99213 OFFICE O/P EST LOW 20 MIN: CPT | Mod: PBBFAC,NTX | Performed by: INTERNAL MEDICINE

## 2021-07-28 PROCEDURE — 99999 PR PBB SHADOW E&M-EST. PATIENT-LVL III: CPT | Mod: PBBFAC,TXP,, | Performed by: INTERNAL MEDICINE

## 2021-07-28 PROCEDURE — 99999 PR PBB SHADOW E&M-EST. PATIENT-LVL III: ICD-10-PCS | Mod: PBBFAC,TXP,, | Performed by: INTERNAL MEDICINE

## 2021-07-28 PROCEDURE — 99213 PR OFFICE/OUTPT VISIT, EST, LEVL III, 20-29 MIN: ICD-10-PCS | Mod: S$PBB,NTX,, | Performed by: INTERNAL MEDICINE

## 2021-07-28 RX ORDER — PANTOPRAZOLE SODIUM 40 MG/1
40 TABLET, DELAYED RELEASE ORAL DAILY
COMMUNITY
Start: 2021-05-13 | End: 2022-01-01

## 2021-07-28 RX ORDER — SEVELAMER CARBONATE 800 MG/1
1600 TABLET, FILM COATED ORAL
COMMUNITY
Start: 2021-07-06

## 2021-07-29 ENCOUNTER — TELEPHONE (OUTPATIENT)
Dept: TRANSPLANT | Facility: CLINIC | Age: 66
End: 2021-07-29

## 2021-07-29 ENCOUNTER — EPISODE CHANGES (OUTPATIENT)
Dept: TRANSPLANT | Facility: CLINIC | Age: 66
End: 2021-07-29

## 2021-07-30 ENCOUNTER — TELEPHONE (OUTPATIENT)
Dept: NEUROLOGY | Facility: CLINIC | Age: 66
End: 2021-07-30

## 2021-08-11 ENCOUNTER — CONFERENCE (OUTPATIENT)
Dept: TRANSPLANT | Facility: CLINIC | Age: 66
End: 2021-08-11

## 2021-08-16 ENCOUNTER — TELEPHONE (OUTPATIENT)
Dept: TRANSPLANT | Facility: CLINIC | Age: 66
End: 2021-08-16

## 2021-08-18 ENCOUNTER — TELEPHONE (OUTPATIENT)
Dept: TRANSPLANT | Facility: CLINIC | Age: 66
End: 2021-08-18

## 2021-08-19 LAB
EXT ALBUMIN: 3.5
EXT ALKALINE PHOSPHATASE: 66
EXT ALT: 9
EXT AST: 6
EXT BILIRUBIN TOTAL: 0.2
EXT BUN: 70
EXT CALCIUM: 8.3
EXT CHLORIDE: 106
EXT CO2: 25
EXT CREATININE: 6.3 MG/DL
EXT EOSINOPHIL%: 3.4
EXT GFR MDRD NON AF AMER: 9
EXT GLUCOSE: 104
EXT HEMATOCRIT: 25.3
EXT HEMOGLOBIN: 8.8
EXT LYMPH%: 12.2
EXT MAGNESIUM: 2
EXT MONOCYTES%: 11.3
EXT PLATELETS: 115
EXT POTASSIUM: 5.5
EXT PROTEIN TOTAL: 6.6
EXT SEGS%: 72.2
EXT SODIUM: 149 MMOL/L
EXT TACROLIMUS LVL: 3.8
EXT WBC: 4.9

## 2021-08-20 ENCOUNTER — TELEPHONE (OUTPATIENT)
Dept: TRANSPLANT | Facility: CLINIC | Age: 66
End: 2021-08-20

## 2021-08-27 ENCOUNTER — OFFICE VISIT (OUTPATIENT)
Dept: SURGERY | Facility: CLINIC | Age: 66
End: 2021-08-27
Payer: MEDICARE

## 2021-08-27 ENCOUNTER — OFFICE VISIT (OUTPATIENT)
Dept: INFECTIOUS DISEASES | Facility: CLINIC | Age: 66
End: 2021-08-27
Payer: MEDICARE

## 2021-08-27 ENCOUNTER — OFFICE VISIT (OUTPATIENT)
Dept: NEUROLOGY | Facility: CLINIC | Age: 66
End: 2021-08-27
Payer: MEDICARE

## 2021-08-27 VITALS
WEIGHT: 252.63 LBS | HEART RATE: 80 BPM | SYSTOLIC BLOOD PRESSURE: 147 MMHG | DIASTOLIC BLOOD PRESSURE: 80 MMHG | TEMPERATURE: 98 F | HEIGHT: 74 IN | BODY MASS INDEX: 32.42 KG/M2

## 2021-08-27 VITALS
BODY MASS INDEX: 32.13 KG/M2 | HEIGHT: 74 IN | SYSTOLIC BLOOD PRESSURE: 125 MMHG | HEART RATE: 81 BPM | DIASTOLIC BLOOD PRESSURE: 67 MMHG

## 2021-08-27 VITALS
WEIGHT: 250.25 LBS | BODY MASS INDEX: 32.12 KG/M2 | DIASTOLIC BLOOD PRESSURE: 73 MMHG | HEIGHT: 74 IN | HEART RATE: 86 BPM | SYSTOLIC BLOOD PRESSURE: 150 MMHG

## 2021-08-27 DIAGNOSIS — R56.9 SEIZURES: ICD-10-CM

## 2021-08-27 DIAGNOSIS — N18.4 CKD (CHRONIC KIDNEY DISEASE) STAGE 4, GFR 15-29 ML/MIN: Primary | ICD-10-CM

## 2021-08-27 DIAGNOSIS — Z94.4 LIVER TRANSPLANTED: ICD-10-CM

## 2021-08-27 DIAGNOSIS — Z76.82 ORGAN TRANSPLANT CANDIDATE: ICD-10-CM

## 2021-08-27 DIAGNOSIS — K56.699 STRICTURE INTESTINAL: Primary | ICD-10-CM

## 2021-08-27 PROCEDURE — 99205 OFFICE O/P NEW HI 60 MIN: CPT | Mod: S$PBB,NTX,, | Performed by: PSYCHIATRY & NEUROLOGY

## 2021-08-27 PROCEDURE — 99204 PR OFFICE/OUTPT VISIT, NEW, LEVL IV, 45-59 MIN: ICD-10-PCS | Mod: S$PBB,NTX,, | Performed by: STUDENT IN AN ORGANIZED HEALTH CARE EDUCATION/TRAINING PROGRAM

## 2021-08-27 PROCEDURE — 99205 PR OFFICE/OUTPT VISIT, NEW, LEVL V, 60-74 MIN: ICD-10-PCS | Mod: S$PBB,TXP,, | Performed by: INTERNAL MEDICINE

## 2021-08-27 PROCEDURE — 99999 PR PBB SHADOW E&M-EST. PATIENT-LVL III: CPT | Mod: PBBFAC,TXP,, | Performed by: STUDENT IN AN ORGANIZED HEALTH CARE EDUCATION/TRAINING PROGRAM

## 2021-08-27 PROCEDURE — 99999 PR PBB SHADOW E&M-EST. PATIENT-LVL II: CPT | Mod: PBBFAC,TXP,, | Performed by: PSYCHIATRY & NEUROLOGY

## 2021-08-27 PROCEDURE — 99212 OFFICE O/P EST SF 10 MIN: CPT | Mod: PBBFAC,27,NTX | Performed by: PSYCHIATRY & NEUROLOGY

## 2021-08-27 PROCEDURE — 99999 PR PBB SHADOW E&M-EST. PATIENT-LVL III: ICD-10-PCS | Mod: PBBFAC,TXP,, | Performed by: INTERNAL MEDICINE

## 2021-08-27 PROCEDURE — 99999 PR PBB SHADOW E&M-EST. PATIENT-LVL III: ICD-10-PCS | Mod: PBBFAC,TXP,, | Performed by: STUDENT IN AN ORGANIZED HEALTH CARE EDUCATION/TRAINING PROGRAM

## 2021-08-27 PROCEDURE — 99213 OFFICE O/P EST LOW 20 MIN: CPT | Mod: PBBFAC,27,TXP | Performed by: INTERNAL MEDICINE

## 2021-08-27 PROCEDURE — 99999 PR PBB SHADOW E&M-EST. PATIENT-LVL II: ICD-10-PCS | Mod: PBBFAC,TXP,, | Performed by: PSYCHIATRY & NEUROLOGY

## 2021-08-27 PROCEDURE — 99213 OFFICE O/P EST LOW 20 MIN: CPT | Mod: PBBFAC,TXP | Performed by: STUDENT IN AN ORGANIZED HEALTH CARE EDUCATION/TRAINING PROGRAM

## 2021-08-27 PROCEDURE — 99999 PR PBB SHADOW E&M-EST. PATIENT-LVL III: CPT | Mod: PBBFAC,TXP,, | Performed by: INTERNAL MEDICINE

## 2021-08-27 PROCEDURE — 99204 OFFICE O/P NEW MOD 45 MIN: CPT | Mod: S$PBB,NTX,, | Performed by: STUDENT IN AN ORGANIZED HEALTH CARE EDUCATION/TRAINING PROGRAM

## 2021-08-27 PROCEDURE — 99205 PR OFFICE/OUTPT VISIT, NEW, LEVL V, 60-74 MIN: ICD-10-PCS | Mod: S$PBB,NTX,, | Performed by: PSYCHIATRY & NEUROLOGY

## 2021-08-27 PROCEDURE — 99205 OFFICE O/P NEW HI 60 MIN: CPT | Mod: S$PBB,TXP,, | Performed by: INTERNAL MEDICINE

## 2021-08-27 RX ORDER — IVERMECTIN 3 MG/1
15 TABLET ORAL DAILY
Qty: 20 TABLET | Refills: 0 | Status: SHIPPED | OUTPATIENT
Start: 2021-08-27 | End: 2021-08-31

## 2021-09-04 ENCOUNTER — PATIENT MESSAGE (OUTPATIENT)
Dept: NEUROLOGY | Facility: CLINIC | Age: 66
End: 2021-09-04

## 2021-09-13 ENCOUNTER — TELEPHONE (OUTPATIENT)
Dept: ENDOSCOPY | Facility: HOSPITAL | Age: 66
End: 2021-09-13

## 2021-09-27 LAB
EXT ALBUMIN: 3.6
EXT ALKALINE PHOSPHATASE: 68
EXT ALT: 7
EXT AST: 4
EXT BILIRUBIN TOTAL: 0.3
EXT BUN: 86
EXT CALCIUM: 8.3
EXT CHLORIDE: 105
EXT CO2: 27
EXT CREATININE: 6.7 MG/DL
EXT EOSINOPHIL%: 1.9
EXT GFR MDRD NON AF AMER: 8
EXT GLUCOSE: 119
EXT HEMATOCRIT: 22.5
EXT HEMOGLOBIN: 7.9
EXT LYMPH%: 11.7
EXT MAGNESIUM: 2.1
EXT MONOCYTES%: 9.9
EXT PLATELETS: 162
EXT POTASSIUM: 4.4
EXT PROTEIN TOTAL: 6.7
EXT SEGS%: 75.7
EXT SODIUM: 139 MMOL/L
EXT TACROLIMUS LVL: 3
EXT WBC: 5.5

## 2021-09-30 ENCOUNTER — PATIENT MESSAGE (OUTPATIENT)
Dept: ENDOSCOPY | Facility: HOSPITAL | Age: 66
End: 2021-09-30

## 2021-09-30 ENCOUNTER — TELEPHONE (OUTPATIENT)
Dept: TRANSPLANT | Facility: CLINIC | Age: 66
End: 2021-09-30

## 2021-09-30 DIAGNOSIS — Z12.11 SCREEN FOR COLON CANCER: Primary | ICD-10-CM

## 2021-09-30 DIAGNOSIS — K74.60 HEPATIC CIRRHOSIS, UNSPECIFIED HEPATIC CIRRHOSIS TYPE, UNSPECIFIED WHETHER ASCITES PRESENT: Primary | ICD-10-CM

## 2021-09-30 RX ORDER — SODIUM, POTASSIUM,MAG SULFATES 17.5-3.13G
1 SOLUTION, RECONSTITUTED, ORAL ORAL DAILY
Qty: 1 KIT | Refills: 0 | Status: SHIPPED | OUTPATIENT
Start: 2021-09-30 | End: 2021-10-02

## 2021-10-20 DIAGNOSIS — Z12.11 COLON CANCER SCREENING: Primary | ICD-10-CM

## 2021-10-20 RX ORDER — SODIUM, POTASSIUM,MAG SULFATES 17.5-3.13G
1 SOLUTION, RECONSTITUTED, ORAL ORAL DAILY
Qty: 1 KIT | Refills: 0 | Status: SHIPPED | OUTPATIENT
Start: 2021-10-20 | End: 2021-10-22

## 2021-11-01 ENCOUNTER — TELEPHONE (OUTPATIENT)
Dept: TRANSPLANT | Facility: CLINIC | Age: 66
End: 2021-11-01
Payer: MEDICARE

## 2021-11-03 ENCOUNTER — EPISODE CHANGES (OUTPATIENT)
Dept: TRANSPLANT | Facility: CLINIC | Age: 66
End: 2021-11-03

## 2021-11-08 ENCOUNTER — TELEPHONE (OUTPATIENT)
Dept: HEPATOLOGY | Facility: CLINIC | Age: 66
End: 2021-11-08
Payer: MEDICARE

## 2021-11-10 ENCOUNTER — TELEPHONE (OUTPATIENT)
Dept: TRANSPLANT | Facility: CLINIC | Age: 66
End: 2021-11-10
Payer: MEDICARE

## 2021-11-10 ENCOUNTER — PATIENT MESSAGE (OUTPATIENT)
Dept: TRANSPLANT | Facility: CLINIC | Age: 66
End: 2021-11-10
Payer: MEDICARE

## 2021-11-10 LAB
EXT ALBUMIN: 3.5
EXT ALKALINE PHOSPHATASE: 79
EXT ALT: 8
EXT AST: 7
EXT BILIRUBIN TOTAL: 0.2
EXT BUN: 94
EXT CALCIUM: 8.4
EXT CHLORIDE: 111
EXT CO2: 20
EXT CREATININE: 6.8 MG/DL
EXT EOSINOPHIL%: 4.6
EXT GFR MDRD NON AF AMER: 8
EXT GLUCOSE: 125
EXT HEMATOCRIT: 23.8
EXT HEMOGLOBIN: 8
EXT LYMPH%: 12.3
EXT MAGNESIUM: 2
EXT MONOCYTES%: 10.1
EXT PLATELETS: 136
EXT POTASSIUM: 5.8
EXT PROTEIN TOTAL: 6.7
EXT SEGS%: 72.4
EXT SODIUM: 140 MMOL/L
EXT TACROLIMUS LVL: 3.4
EXT WBC: 4.2

## 2021-11-15 ENCOUNTER — TELEPHONE (OUTPATIENT)
Dept: TRANSPLANT | Facility: CLINIC | Age: 66
End: 2021-11-15
Payer: MEDICARE

## 2021-11-15 ENCOUNTER — PATIENT MESSAGE (OUTPATIENT)
Dept: TRANSPLANT | Facility: CLINIC | Age: 66
End: 2021-11-15
Payer: MEDICARE

## 2021-11-16 LAB — EXT POTASSIUM: 4.8

## 2021-11-19 ENCOUNTER — PATIENT MESSAGE (OUTPATIENT)
Dept: TRANSPLANT | Facility: CLINIC | Age: 66
End: 2021-11-19
Payer: MEDICARE

## 2021-11-19 ENCOUNTER — TELEPHONE (OUTPATIENT)
Dept: TRANSPLANT | Facility: CLINIC | Age: 66
End: 2021-11-19
Payer: MEDICARE

## 2021-12-13 ENCOUNTER — TELEPHONE (OUTPATIENT)
Dept: ENDOSCOPY | Facility: HOSPITAL | Age: 66
End: 2021-12-13
Payer: MEDICARE

## 2021-12-14 LAB
EXT ALBUMIN: 3.8
EXT ALKALINE PHOSPHATASE: 90
EXT ALT: 10
EXT AST: 7
EXT BILIRUBIN TOTAL: 0.3
EXT BUN: 76
EXT CALCIUM: 8.5
EXT CHLORIDE: 105
EXT CO2: 25
EXT CREATININE: 5.8 MG/DL
EXT EOSINOPHIL%: 3
EXT GFR MDRD NON AF AMER: 10
EXT GLUCOSE: 120
EXT HEMATOCRIT: 29.9
EXT HEMOGLOBIN: 10.1
EXT LYMPH%: 12.1
EXT MAGNESIUM: 2.2
EXT MONOCYTES%: 9.5
EXT PLATELETS: 151
EXT POTASSIUM: 5.3
EXT PROTEIN TOTAL: 7.2
EXT SEGS%: 74.7
EXT SODIUM: 138 MMOL/L
EXT TACROLIMUS LVL: 5.3
EXT WBC: 5.5

## 2021-12-17 ENCOUNTER — PATIENT MESSAGE (OUTPATIENT)
Dept: TRANSPLANT | Facility: CLINIC | Age: 66
End: 2021-12-17
Payer: MEDICARE

## 2021-12-17 ENCOUNTER — TELEPHONE (OUTPATIENT)
Dept: TRANSPLANT | Facility: CLINIC | Age: 66
End: 2021-12-17
Payer: MEDICARE

## 2021-12-20 ENCOUNTER — ANESTHESIA EVENT (OUTPATIENT)
Dept: ENDOSCOPY | Facility: HOSPITAL | Age: 66
End: 2021-12-20
Payer: MEDICARE

## 2021-12-20 ENCOUNTER — ANESTHESIA (OUTPATIENT)
Dept: ENDOSCOPY | Facility: HOSPITAL | Age: 66
End: 2021-12-20
Payer: MEDICARE

## 2021-12-20 ENCOUNTER — HOSPITAL ENCOUNTER (OUTPATIENT)
Facility: HOSPITAL | Age: 66
Discharge: HOME | End: 2021-12-20
Attending: INTERNAL MEDICINE | Admitting: INTERNAL MEDICINE
Payer: MEDICARE

## 2021-12-20 VITALS
OXYGEN SATURATION: 100 % | DIASTOLIC BLOOD PRESSURE: 67 MMHG | TEMPERATURE: 99 F | WEIGHT: 240 LBS | SYSTOLIC BLOOD PRESSURE: 129 MMHG | HEART RATE: 93 BPM | BODY MASS INDEX: 30.8 KG/M2 | RESPIRATION RATE: 14 BRPM | HEIGHT: 74 IN

## 2021-12-20 DIAGNOSIS — Z86.010 HISTORY OF COLON POLYPS: ICD-10-CM

## 2021-12-20 LAB — SARS-COV-2 RDRP RESP QL NAA+PROBE: NEGATIVE

## 2021-12-20 PROCEDURE — 27201089 HC SNARE, DISP (ANY): Mod: TXP | Performed by: INTERNAL MEDICINE

## 2021-12-20 PROCEDURE — D9220A PRA ANESTHESIA: Mod: PT,ANES,NTX, | Performed by: ANESTHESIOLOGY

## 2021-12-20 PROCEDURE — 25000003 PHARM REV CODE 250: Mod: TXP | Performed by: NURSE ANESTHETIST, CERTIFIED REGISTERED

## 2021-12-20 PROCEDURE — 88305 TISSUE EXAM BY PATHOLOGIST: CPT | Mod: 26,TXP,, | Performed by: PATHOLOGY

## 2021-12-20 PROCEDURE — U0002 COVID-19 LAB TEST NON-CDC: HCPCS | Mod: TXP | Performed by: INTERNAL MEDICINE

## 2021-12-20 PROCEDURE — 88305 TISSUE EXAM BY PATHOLOGIST: ICD-10-PCS | Mod: 26,TXP,, | Performed by: PATHOLOGY

## 2021-12-20 PROCEDURE — D9220A PRA ANESTHESIA: ICD-10-PCS | Mod: PT,ANES,NTX, | Performed by: ANESTHESIOLOGY

## 2021-12-20 PROCEDURE — 45385 PR COLONOSCOPY,REMV LESN,SNARE: ICD-10-PCS | Mod: PT,GC,NTX, | Performed by: INTERNAL MEDICINE

## 2021-12-20 PROCEDURE — 45385 COLONOSCOPY W/LESION REMOVAL: CPT | Mod: PT,GC,NTX, | Performed by: INTERNAL MEDICINE

## 2021-12-20 PROCEDURE — D9220A PRA ANESTHESIA: Mod: PT,CRNA,NTX, | Performed by: NURSE ANESTHETIST, CERTIFIED REGISTERED

## 2021-12-20 PROCEDURE — 63600175 PHARM REV CODE 636 W HCPCS: Mod: TXP | Performed by: NURSE ANESTHETIST, CERTIFIED REGISTERED

## 2021-12-20 PROCEDURE — 37000009 HC ANESTHESIA EA ADD 15 MINS: Mod: TXP | Performed by: INTERNAL MEDICINE

## 2021-12-20 PROCEDURE — D9220A PRA ANESTHESIA: ICD-10-PCS | Mod: PT,CRNA,NTX, | Performed by: NURSE ANESTHETIST, CERTIFIED REGISTERED

## 2021-12-20 PROCEDURE — 37000008 HC ANESTHESIA 1ST 15 MINUTES: Mod: TXP | Performed by: INTERNAL MEDICINE

## 2021-12-20 PROCEDURE — 88305 TISSUE EXAM BY PATHOLOGIST: CPT | Mod: 59,TXP | Performed by: PATHOLOGY

## 2021-12-20 PROCEDURE — 45385 COLONOSCOPY W/LESION REMOVAL: CPT | Mod: TXP | Performed by: INTERNAL MEDICINE

## 2021-12-20 RX ORDER — LIDOCAINE HYDROCHLORIDE 20 MG/ML
INJECTION INTRAVENOUS
Status: DISCONTINUED | OUTPATIENT
Start: 2021-12-20 | End: 2021-12-20

## 2021-12-20 RX ORDER — SODIUM CHLORIDE 9 MG/ML
INJECTION, SOLUTION INTRAVENOUS CONTINUOUS
Status: DISCONTINUED | OUTPATIENT
Start: 2021-12-20 | End: 2021-12-20 | Stop reason: HOSPADM

## 2021-12-20 RX ORDER — ONDANSETRON 2 MG/ML
4 INJECTION INTRAMUSCULAR; INTRAVENOUS DAILY PRN
Status: CANCELLED | OUTPATIENT
Start: 2021-12-20

## 2021-12-20 RX ORDER — PROPOFOL 10 MG/ML
VIAL (ML) INTRAVENOUS
Status: DISCONTINUED | OUTPATIENT
Start: 2021-12-20 | End: 2021-12-20

## 2021-12-20 RX ADMIN — SODIUM CHLORIDE: 0.9 INJECTION, SOLUTION INTRAVENOUS at 10:12

## 2021-12-20 RX ADMIN — PROPOFOL 150 MCG/KG/MIN: 10 INJECTION, EMULSION INTRAVENOUS at 11:12

## 2021-12-20 RX ADMIN — PROPOFOL 30 MG: 10 INJECTION, EMULSION INTRAVENOUS at 11:12

## 2021-12-20 RX ADMIN — LIDOCAINE HYDROCHLORIDE 50 MG: 20 INJECTION, SOLUTION INTRAVENOUS at 11:12

## 2021-12-20 NOTE — ASSESSMENT & PLAN NOTE
- Oral Keppra transitioned to IV as pt not able to swallow 6/26.  - Resumed oral Keppra since mentation improved.  - EEG 6/26 negative.   negative Affect and characteristics of appearance, verbalizations, behaviors are appropriate

## 2021-12-28 LAB
FINAL PATHOLOGIC DIAGNOSIS: NORMAL
GROSS: NORMAL
Lab: NORMAL

## 2022-01-01 ENCOUNTER — TELEPHONE (OUTPATIENT)
Dept: TRANSPLANT | Facility: CLINIC | Age: 67
End: 2022-01-01
Payer: MEDICARE

## 2022-01-01 ENCOUNTER — PATIENT MESSAGE (OUTPATIENT)
Dept: GASTROENTEROLOGY | Facility: CLINIC | Age: 67
End: 2022-01-01
Payer: MEDICARE

## 2022-01-01 ENCOUNTER — PATIENT MESSAGE (OUTPATIENT)
Dept: HEPATOLOGY | Facility: CLINIC | Age: 67
End: 2022-01-01

## 2022-01-01 ENCOUNTER — TELEPHONE (OUTPATIENT)
Dept: HEMATOLOGY/ONCOLOGY | Facility: CLINIC | Age: 67
End: 2022-01-01
Payer: MEDICARE

## 2022-01-01 ENCOUNTER — TELEPHONE (OUTPATIENT)
Dept: CARDIOLOGY | Facility: HOSPITAL | Age: 67
End: 2022-01-01
Payer: MEDICARE

## 2022-01-01 ENCOUNTER — ANESTHESIA (OUTPATIENT)
Dept: ENDOSCOPY | Facility: HOSPITAL | Age: 67
End: 2022-01-01
Payer: MEDICARE

## 2022-01-01 ENCOUNTER — HOSPITAL ENCOUNTER (OUTPATIENT)
Dept: RADIOLOGY | Facility: HOSPITAL | Age: 67
Discharge: HOME | End: 2022-06-03
Attending: NURSE PRACTITIONER
Payer: MEDICARE

## 2022-01-01 ENCOUNTER — ANESTHESIA (OUTPATIENT)
Dept: ENDOSCOPY | Facility: HOSPITAL | Age: 67
DRG: 377 | End: 2022-01-01
Payer: MEDICARE

## 2022-01-01 ENCOUNTER — TELEPHONE (OUTPATIENT)
Dept: GASTROENTEROLOGY | Facility: CLINIC | Age: 67
End: 2022-01-01
Payer: MEDICARE

## 2022-01-01 ENCOUNTER — OFFICE VISIT (OUTPATIENT)
Dept: HEMATOLOGY/ONCOLOGY | Facility: CLINIC | Age: 67
End: 2022-01-01
Payer: MEDICARE

## 2022-01-01 ENCOUNTER — LAB VISIT (OUTPATIENT)
Dept: LAB | Facility: HOSPITAL | Age: 67
End: 2022-01-01
Attending: INTERNAL MEDICINE
Payer: MEDICARE

## 2022-01-01 ENCOUNTER — ANESTHESIA EVENT (OUTPATIENT)
Dept: ENDOSCOPY | Facility: HOSPITAL | Age: 67
DRG: 326 | End: 2022-01-01
Payer: MEDICARE

## 2022-01-01 ENCOUNTER — HOSPITAL ENCOUNTER (OUTPATIENT)
Dept: CARDIOLOGY | Facility: HOSPITAL | Age: 67
Discharge: HOME | End: 2022-06-03
Attending: NURSE PRACTITIONER
Payer: MEDICARE

## 2022-01-01 ENCOUNTER — PATIENT OUTREACH (OUTPATIENT)
Dept: ADMINISTRATIVE | Facility: CLINIC | Age: 67
End: 2022-01-01
Payer: MEDICARE

## 2022-01-01 ENCOUNTER — TELEPHONE (OUTPATIENT)
Dept: PULMONOLOGY | Facility: CLINIC | Age: 67
End: 2022-01-01
Payer: MEDICARE

## 2022-01-01 ENCOUNTER — ANESTHESIA EVENT (OUTPATIENT)
Dept: ENDOSCOPY | Facility: HOSPITAL | Age: 67
DRG: 377 | End: 2022-01-01
Payer: MEDICARE

## 2022-01-01 ENCOUNTER — OFFICE VISIT (OUTPATIENT)
Dept: HEPATOLOGY | Facility: CLINIC | Age: 67
End: 2022-01-01
Payer: MEDICARE

## 2022-01-01 ENCOUNTER — PATIENT MESSAGE (OUTPATIENT)
Dept: RESEARCH | Facility: CLINIC | Age: 67
End: 2022-01-01
Payer: MEDICARE

## 2022-01-01 ENCOUNTER — PATIENT MESSAGE (OUTPATIENT)
Dept: HEPATOLOGY | Facility: CLINIC | Age: 67
End: 2022-01-01
Payer: MEDICARE

## 2022-01-01 ENCOUNTER — OFFICE VISIT (OUTPATIENT)
Dept: PULMONOLOGY | Facility: CLINIC | Age: 67
End: 2022-01-01
Payer: MEDICARE

## 2022-01-01 ENCOUNTER — ANESTHESIA (OUTPATIENT)
Dept: ENDOSCOPY | Facility: HOSPITAL | Age: 67
DRG: 326 | End: 2022-01-01
Payer: MEDICARE

## 2022-01-01 ENCOUNTER — TELEPHONE (OUTPATIENT)
Dept: HEPATOLOGY | Facility: CLINIC | Age: 67
End: 2022-01-01
Payer: MEDICARE

## 2022-01-01 ENCOUNTER — LAB VISIT (OUTPATIENT)
Dept: LAB | Facility: HOSPITAL | Age: 67
End: 2022-01-01
Attending: NURSE PRACTITIONER
Payer: MEDICARE

## 2022-01-01 ENCOUNTER — PATIENT MESSAGE (OUTPATIENT)
Dept: TRANSPLANT | Facility: CLINIC | Age: 67
End: 2022-01-01
Payer: MEDICARE

## 2022-01-01 ENCOUNTER — OFFICE VISIT (OUTPATIENT)
Dept: TRANSPLANT | Facility: CLINIC | Age: 67
End: 2022-01-01
Payer: MEDICARE

## 2022-01-01 ENCOUNTER — ANESTHESIA EVENT (OUTPATIENT)
Dept: ENDOSCOPY | Facility: HOSPITAL | Age: 67
End: 2022-01-01
Payer: MEDICARE

## 2022-01-01 ENCOUNTER — HOSPITAL ENCOUNTER (INPATIENT)
Facility: HOSPITAL | Age: 67
LOS: 2 days | Discharge: HOME | DRG: 377 | End: 2022-08-25
Attending: EMERGENCY MEDICINE | Admitting: EMERGENCY MEDICINE
Payer: MEDICARE

## 2022-01-01 ENCOUNTER — HOSPITAL ENCOUNTER (OUTPATIENT)
Dept: RADIOLOGY | Facility: HOSPITAL | Age: 67
Discharge: HOME | End: 2022-11-16
Attending: INTERNAL MEDICINE
Payer: MEDICARE

## 2022-01-01 ENCOUNTER — HOSPITAL ENCOUNTER (OUTPATIENT)
Dept: PREADMISSION TESTING | Facility: HOSPITAL | Age: 67
Discharge: HOME | End: 2022-11-11
Attending: INTERNAL MEDICINE
Payer: MEDICARE

## 2022-01-01 ENCOUNTER — TELEPHONE (OUTPATIENT)
Dept: PREADMISSION TESTING | Facility: HOSPITAL | Age: 67
End: 2022-01-01
Payer: MEDICARE

## 2022-01-01 ENCOUNTER — HOSPITAL ENCOUNTER (OUTPATIENT)
Facility: HOSPITAL | Age: 67
Discharge: HOME | End: 2022-11-25
Attending: INTERNAL MEDICINE | Admitting: INTERNAL MEDICINE
Payer: MEDICARE

## 2022-01-01 ENCOUNTER — HOSPITAL ENCOUNTER (INPATIENT)
Facility: HOSPITAL | Age: 67
LOS: 7 days | Discharge: HOME HEALTH | DRG: 326 | End: 2022-09-05
Attending: HOSPITALIST | Admitting: INTERNAL MEDICINE
Payer: MEDICARE

## 2022-01-01 ENCOUNTER — PATIENT MESSAGE (OUTPATIENT)
Dept: ADMINISTRATIVE | Facility: CLINIC | Age: 67
End: 2022-01-01
Payer: MEDICARE

## 2022-01-01 VITALS
DIASTOLIC BLOOD PRESSURE: 70 MMHG | HEART RATE: 102 BPM | WEIGHT: 225.75 LBS | OXYGEN SATURATION: 98 % | HEIGHT: 74 IN | BODY MASS INDEX: 28.97 KG/M2 | RESPIRATION RATE: 19 BRPM | SYSTOLIC BLOOD PRESSURE: 112 MMHG

## 2022-01-01 VITALS
BODY MASS INDEX: 28.64 KG/M2 | RESPIRATION RATE: 16 BRPM | OXYGEN SATURATION: 95 % | SYSTOLIC BLOOD PRESSURE: 130 MMHG | TEMPERATURE: 97 F | DIASTOLIC BLOOD PRESSURE: 73 MMHG | HEIGHT: 74 IN | HEART RATE: 104 BPM | WEIGHT: 223.13 LBS

## 2022-01-01 VITALS
BODY MASS INDEX: 30.8 KG/M2 | SYSTOLIC BLOOD PRESSURE: 110 MMHG | WEIGHT: 240 LBS | HEIGHT: 74 IN | HEART RATE: 110 BPM | DIASTOLIC BLOOD PRESSURE: 62 MMHG

## 2022-01-01 VITALS
HEIGHT: 74 IN | BODY MASS INDEX: 24.13 KG/M2 | DIASTOLIC BLOOD PRESSURE: 74 MMHG | WEIGHT: 188.06 LBS | SYSTOLIC BLOOD PRESSURE: 121 MMHG | OXYGEN SATURATION: 100 % | RESPIRATION RATE: 17 BRPM | TEMPERATURE: 98 F | HEART RATE: 96 BPM

## 2022-01-01 VITALS
BODY MASS INDEX: 29.79 KG/M2 | HEART RATE: 100 BPM | WEIGHT: 232.13 LBS | HEIGHT: 74 IN | DIASTOLIC BLOOD PRESSURE: 68 MMHG | SYSTOLIC BLOOD PRESSURE: 104 MMHG

## 2022-01-01 VITALS
RESPIRATION RATE: 16 BRPM | TEMPERATURE: 99 F | OXYGEN SATURATION: 96 % | SYSTOLIC BLOOD PRESSURE: 119 MMHG | DIASTOLIC BLOOD PRESSURE: 60 MMHG | HEART RATE: 83 BPM | BODY MASS INDEX: 34.25 KG/M2 | HEIGHT: 72 IN | WEIGHT: 252.88 LBS

## 2022-01-01 VITALS
BODY MASS INDEX: 26.94 KG/M2 | WEIGHT: 209.88 LBS | HEART RATE: 102 BPM | DIASTOLIC BLOOD PRESSURE: 74 MMHG | SYSTOLIC BLOOD PRESSURE: 122 MMHG | HEIGHT: 74 IN

## 2022-01-01 VITALS
OXYGEN SATURATION: 100 % | HEART RATE: 102 BPM | SYSTOLIC BLOOD PRESSURE: 116 MMHG | HEIGHT: 72 IN | BODY MASS INDEX: 29.59 KG/M2 | RESPIRATION RATE: 18 BRPM | WEIGHT: 218.5 LBS | DIASTOLIC BLOOD PRESSURE: 70 MMHG

## 2022-01-01 VITALS
WEIGHT: 225 LBS | SYSTOLIC BLOOD PRESSURE: 112 MMHG | RESPIRATION RATE: 17 BRPM | TEMPERATURE: 98 F | HEIGHT: 74 IN | HEART RATE: 72 BPM | OXYGEN SATURATION: 98 % | DIASTOLIC BLOOD PRESSURE: 73 MMHG | BODY MASS INDEX: 28.88 KG/M2

## 2022-01-01 VITALS
WEIGHT: 240 LBS | DIASTOLIC BLOOD PRESSURE: 84 MMHG | BODY MASS INDEX: 30.8 KG/M2 | HEIGHT: 74 IN | SYSTOLIC BLOOD PRESSURE: 154 MMHG | HEART RATE: 104 BPM

## 2022-01-01 DIAGNOSIS — Z99.2 ESRD ON PERITONEAL DIALYSIS: Chronic | ICD-10-CM

## 2022-01-01 DIAGNOSIS — J90 PLEURAL EFFUSION ON LEFT: ICD-10-CM

## 2022-01-01 DIAGNOSIS — Z94.4 STATUS POST LIVER TRANSPLANT: ICD-10-CM

## 2022-01-01 DIAGNOSIS — Z76.82 ORGAN TRANSPLANT CANDIDATE: Primary | ICD-10-CM

## 2022-01-01 DIAGNOSIS — R06.02 SOBOE (SHORTNESS OF BREATH ON EXERTION): ICD-10-CM

## 2022-01-01 DIAGNOSIS — D69.6 THROMBOCYTOPENIA: Primary | ICD-10-CM

## 2022-01-01 DIAGNOSIS — Z94.4 LIVER REPLACED BY TRANSPLANT: ICD-10-CM

## 2022-01-01 DIAGNOSIS — D63.8 ANEMIA OF CHRONIC DISEASE: ICD-10-CM

## 2022-01-01 DIAGNOSIS — R11.2 NAUSEA AND VOMITING, UNSPECIFIED VOMITING TYPE: ICD-10-CM

## 2022-01-01 DIAGNOSIS — Z99.2 PERITONEAL DIALYSIS STATUS: ICD-10-CM

## 2022-01-01 DIAGNOSIS — D84.9 IMMUNOSUPPRESSION: ICD-10-CM

## 2022-01-01 DIAGNOSIS — Z99.2 ANEMIA DUE TO CHRONIC KIDNEY DISEASE, ON CHRONIC DIALYSIS: ICD-10-CM

## 2022-01-01 DIAGNOSIS — I25.10 CORONARY ARTERY DISEASE INVOLVING NATIVE CORONARY ARTERY OF NATIVE HEART WITHOUT ANGINA PECTORIS: ICD-10-CM

## 2022-01-01 DIAGNOSIS — K92.2 UPPER GI BLEED: Primary | ICD-10-CM

## 2022-01-01 DIAGNOSIS — R77.8 ABNORMAL SPEP: Primary | ICD-10-CM

## 2022-01-01 DIAGNOSIS — N18.4 CKD (CHRONIC KIDNEY DISEASE) STAGE 4, GFR 15-29 ML/MIN: Primary | ICD-10-CM

## 2022-01-01 DIAGNOSIS — R77.8 ABNORMAL SPEP: ICD-10-CM

## 2022-01-01 DIAGNOSIS — Z99.2 ESRD ON PERITONEAL DIALYSIS: ICD-10-CM

## 2022-01-01 DIAGNOSIS — Z76.82 ORGAN TRANSPLANT CANDIDATE: ICD-10-CM

## 2022-01-01 DIAGNOSIS — D64.9 ANEMIA, UNSPECIFIED TYPE: ICD-10-CM

## 2022-01-01 DIAGNOSIS — R00.1 BRADYCARDIA: ICD-10-CM

## 2022-01-01 DIAGNOSIS — Z76.82 KIDNEY TRANSPLANT CANDIDATE: ICD-10-CM

## 2022-01-01 DIAGNOSIS — Z94.4 H/O LIVER TRANSPLANT: ICD-10-CM

## 2022-01-01 DIAGNOSIS — K25.4 GASTROINTESTINAL HEMORRHAGE ASSOCIATED WITH GASTRIC ULCER: Primary | ICD-10-CM

## 2022-01-01 DIAGNOSIS — K90.89 OTHER INTESTINAL MALABSORPTION: ICD-10-CM

## 2022-01-01 DIAGNOSIS — I10 ESSENTIAL HYPERTENSION: ICD-10-CM

## 2022-01-01 DIAGNOSIS — K92.2 ACUTE UPPER GI BLEEDING: ICD-10-CM

## 2022-01-01 DIAGNOSIS — K25.0 ACUTE GASTRIC ULCER WITH HEMORRHAGE: Primary | ICD-10-CM

## 2022-01-01 DIAGNOSIS — N18.6 ANEMIA DUE TO CHRONIC KIDNEY DISEASE, ON CHRONIC DIALYSIS: Primary | ICD-10-CM

## 2022-01-01 DIAGNOSIS — D63.1 ANEMIA DUE TO CHRONIC KIDNEY DISEASE, ON CHRONIC DIALYSIS: ICD-10-CM

## 2022-01-01 DIAGNOSIS — N18.6 ESRD ON PERITONEAL DIALYSIS: ICD-10-CM

## 2022-01-01 DIAGNOSIS — Z99.2 DEPENDENCE ON HEMODIALYSIS: ICD-10-CM

## 2022-01-01 DIAGNOSIS — R56.9 SEIZURES: ICD-10-CM

## 2022-01-01 DIAGNOSIS — N18.6 ESRD ON PERITONEAL DIALYSIS: Chronic | ICD-10-CM

## 2022-01-01 DIAGNOSIS — R00.0 TACHYCARDIA: ICD-10-CM

## 2022-01-01 DIAGNOSIS — D63.1 ANEMIA DUE TO CHRONIC KIDNEY DISEASE, ON CHRONIC DIALYSIS: Primary | ICD-10-CM

## 2022-01-01 DIAGNOSIS — Z94.4 LIVER TRANSPLANTED: ICD-10-CM

## 2022-01-01 DIAGNOSIS — Z91.89 SEDENTARY LIFESTYLE: ICD-10-CM

## 2022-01-01 DIAGNOSIS — N18.4 TYPE 2 DIABETES MELLITUS WITH STAGE 4 CHRONIC KIDNEY DISEASE, WITHOUT LONG-TERM CURRENT USE OF INSULIN: ICD-10-CM

## 2022-01-01 DIAGNOSIS — K25.4 GASTROINTESTINAL HEMORRHAGE ASSOCIATED WITH GASTRIC ULCER: ICD-10-CM

## 2022-01-01 DIAGNOSIS — E11.22 TYPE 2 DIABETES MELLITUS WITH STAGE 4 CHRONIC KIDNEY DISEASE, WITHOUT LONG-TERM CURRENT USE OF INSULIN: ICD-10-CM

## 2022-01-01 DIAGNOSIS — K74.60 HEPATIC CIRRHOSIS, UNSPECIFIED HEPATIC CIRRHOSIS TYPE, UNSPECIFIED WHETHER ASCITES PRESENT: ICD-10-CM

## 2022-01-01 DIAGNOSIS — R58 RETROPERITONEAL BLEED: ICD-10-CM

## 2022-01-01 DIAGNOSIS — R07.9 CHEST PAIN: ICD-10-CM

## 2022-01-01 DIAGNOSIS — Z99.2 ESRD ON HEMODIALYSIS: ICD-10-CM

## 2022-01-01 DIAGNOSIS — N18.6 ANEMIA DUE TO CHRONIC KIDNEY DISEASE, ON CHRONIC DIALYSIS: ICD-10-CM

## 2022-01-01 DIAGNOSIS — Z76.82 PATIENT ON WAITING LIST FOR KIDNEY TRANSPLANT: ICD-10-CM

## 2022-01-01 DIAGNOSIS — Z29.89 PROPHYLACTIC IMMUNOTHERAPY: ICD-10-CM

## 2022-01-01 DIAGNOSIS — Z87.11 HISTORY OF GASTRIC ULCER: Primary | ICD-10-CM

## 2022-01-01 DIAGNOSIS — J90 PLEURAL EFFUSION ON LEFT: Primary | ICD-10-CM

## 2022-01-01 DIAGNOSIS — K90.49 MALABSORPTION DUE TO INTOLERANCE, NOT ELSEWHERE CLASSIFIED: ICD-10-CM

## 2022-01-01 DIAGNOSIS — R76.8 ELEVATED SERUM IMMUNOGLOBULIN FREE LIGHT CHAIN LEVEL: ICD-10-CM

## 2022-01-01 DIAGNOSIS — D62 ACUTE BLOOD LOSS ANEMIA: ICD-10-CM

## 2022-01-01 DIAGNOSIS — R14.2 BELCHING: ICD-10-CM

## 2022-01-01 DIAGNOSIS — K25.9 GASTRIC ULCER: ICD-10-CM

## 2022-01-01 DIAGNOSIS — Z99.2 ANEMIA DUE TO CHRONIC KIDNEY DISEASE, ON CHRONIC DIALYSIS: Primary | ICD-10-CM

## 2022-01-01 DIAGNOSIS — K92.0 HEMATEMESIS: Primary | ICD-10-CM

## 2022-01-01 DIAGNOSIS — N18.6 ESRD ON HEMODIALYSIS: ICD-10-CM

## 2022-01-01 DIAGNOSIS — Z01.818 OTHER SPECIFIED PRE-OPERATIVE EXAMINATION: Primary | ICD-10-CM

## 2022-01-01 DIAGNOSIS — Z98.890 S/P THORACENTESIS: ICD-10-CM

## 2022-01-01 DIAGNOSIS — K92.2 GI BLEED: ICD-10-CM

## 2022-01-01 DIAGNOSIS — D84.9 IMMUNOSUPPRESSION: Primary | ICD-10-CM

## 2022-01-01 DIAGNOSIS — D73.5 SPLENIC INFARCTION: ICD-10-CM

## 2022-01-01 DIAGNOSIS — Z87.11 HISTORY OF GASTRIC ULCER: ICD-10-CM

## 2022-01-01 LAB
25(OH)D3+25(OH)D2 SERPL-MCNC: 33 NG/ML (ref 30–96)
ABO + RH BLD: NORMAL
ALBUMIN SERPL BCP-MCNC: 1.4 G/DL (ref 3.5–5.2)
ALBUMIN SERPL BCP-MCNC: 1.4 G/DL (ref 3.5–5.2)
ALBUMIN SERPL BCP-MCNC: 1.5 G/DL (ref 3.5–5.2)
ALBUMIN SERPL BCP-MCNC: 1.5 G/DL (ref 3.5–5.2)
ALBUMIN SERPL BCP-MCNC: 1.6 G/DL (ref 3.5–5.2)
ALBUMIN SERPL BCP-MCNC: 1.6 G/DL (ref 3.5–5.2)
ALBUMIN SERPL BCP-MCNC: 2.2 G/DL (ref 3.5–5.2)
ALBUMIN SERPL BCP-MCNC: 2.6 G/DL (ref 3.5–5.2)
ALBUMIN SERPL ELPH-MCNC: 3.16 G/DL (ref 3.35–5.55)
ALLENS TEST: ABNORMAL
ALLENS TEST: ABNORMAL
ALP SERPL-CCNC: 39 U/L (ref 55–135)
ALP SERPL-CCNC: 41 U/L (ref 55–135)
ALP SERPL-CCNC: 43 U/L (ref 55–135)
ALP SERPL-CCNC: 47 U/L (ref 55–135)
ALP SERPL-CCNC: 52 U/L (ref 55–135)
ALP SERPL-CCNC: 54 U/L (ref 55–135)
ALP SERPL-CCNC: 60 U/L (ref 55–135)
ALPHA1 GLOB SERPL ELPH-MCNC: 0.65 G/DL (ref 0.17–0.41)
ALPHA2 GLOB SERPL ELPH-MCNC: 0.97 G/DL (ref 0.43–0.99)
ALT SERPL W/O P-5'-P-CCNC: 10 U/L (ref 10–44)
ALT SERPL W/O P-5'-P-CCNC: 10 U/L (ref 10–44)
ALT SERPL W/O P-5'-P-CCNC: 11 U/L (ref 10–44)
ALT SERPL W/O P-5'-P-CCNC: 12 U/L (ref 10–44)
ALT SERPL W/O P-5'-P-CCNC: 12 U/L (ref 10–44)
ALT SERPL W/O P-5'-P-CCNC: 5 U/L (ref 10–44)
ALT SERPL W/O P-5'-P-CCNC: 6 U/L (ref 10–44)
ANA SER QL IF: NORMAL
ANION GAP SERPL CALC-SCNC: 10 MMOL/L (ref 8–16)
ANION GAP SERPL CALC-SCNC: 10 MMOL/L (ref 8–16)
ANION GAP SERPL CALC-SCNC: 11 MMOL/L (ref 8–16)
ANION GAP SERPL CALC-SCNC: 11 MMOL/L (ref 8–16)
ANION GAP SERPL CALC-SCNC: 12 MMOL/L (ref 8–16)
ANION GAP SERPL CALC-SCNC: 13 MMOL/L (ref 8–16)
ANION GAP SERPL CALC-SCNC: 14 MMOL/L (ref 8–16)
ANION GAP SERPL CALC-SCNC: 17 MMOL/L (ref 8–16)
ANION GAP SERPL CALC-SCNC: 17 MMOL/L (ref 8–16)
ANION GAP SERPL CALC-SCNC: 8 MMOL/L (ref 8–16)
ANION GAP SERPL CALC-SCNC: 9 MMOL/L (ref 8–16)
ASCENDING AORTA: 3.16 CM
AST SERPL-CCNC: 10 U/L (ref 10–40)
AST SERPL-CCNC: 11 U/L (ref 10–40)
AST SERPL-CCNC: 12 U/L (ref 10–40)
AST SERPL-CCNC: 14 U/L (ref 10–40)
AST SERPL-CCNC: 17 U/L (ref 10–40)
AST SERPL-CCNC: 9 U/L (ref 10–40)
AST SERPL-CCNC: 9 U/L (ref 10–40)
AV INDEX (PROSTH): 0.48
AV MEAN GRADIENT: 7 MMHG
AV PEAK GRADIENT: 12 MMHG
AV VALVE AREA: 2.06 CM2
AV VELOCITY RATIO: 0.39
B-GLOBULIN SERPL ELPH-MCNC: 0.85 G/DL (ref 0.5–1.1)
BASOPHILS # BLD AUTO: 0.01 K/UL (ref 0–0.2)
BASOPHILS # BLD AUTO: 0.02 K/UL (ref 0–0.2)
BASOPHILS # BLD AUTO: 0.02 K/UL (ref 0–0.2)
BASOPHILS # BLD AUTO: 0.03 K/UL (ref 0–0.2)
BASOPHILS # BLD AUTO: 0.04 K/UL (ref 0–0.2)
BASOPHILS # BLD AUTO: 0.05 K/UL (ref 0–0.2)
BASOPHILS # BLD AUTO: 0.11 K/UL (ref 0–0.2)
BASOPHILS NFR BLD: 0.2 % (ref 0–1.9)
BASOPHILS NFR BLD: 0.2 % (ref 0–1.9)
BASOPHILS NFR BLD: 0.3 % (ref 0–1.9)
BASOPHILS NFR BLD: 0.5 % (ref 0–1.9)
BASOPHILS NFR BLD: 0.6 % (ref 0–1.9)
BASOPHILS NFR BLD: 0.7 % (ref 0–1.9)
BILIRUB SERPL-MCNC: 0.2 MG/DL (ref 0.1–1)
BILIRUB SERPL-MCNC: 0.3 MG/DL (ref 0.1–1)
BILIRUB SERPL-MCNC: 0.3 MG/DL (ref 0.1–1)
BILIRUB SERPL-MCNC: 0.5 MG/DL (ref 0.1–1)
BILIRUB SERPL-MCNC: 0.6 MG/DL (ref 0.1–1)
BLD GP AB SCN CELLS X3 SERPL QL: NORMAL
BLD PROD TYP BPU: NORMAL
BLOOD UNIT EXPIRATION DATE: NORMAL
BLOOD UNIT TYPE CODE: 5100
BLOOD UNIT TYPE CODE: 6200
BLOOD UNIT TYPE: NORMAL
BSA FOR ECHO PROCEDURE: 2.38 M2
BUN SERPL-MCNC: 100 MG/DL (ref 8–23)
BUN SERPL-MCNC: 105 MG/DL (ref 8–23)
BUN SERPL-MCNC: 106 MG/DL (ref 8–23)
BUN SERPL-MCNC: 114 MG/DL (ref 8–23)
BUN SERPL-MCNC: 115 MG/DL (ref 8–23)
BUN SERPL-MCNC: 116 MG/DL (ref 8–23)
BUN SERPL-MCNC: 118 MG/DL (ref 8–23)
BUN SERPL-MCNC: 74 MG/DL (ref 8–23)
BUN SERPL-MCNC: 85 MG/DL (ref 8–23)
BUN SERPL-MCNC: 90 MG/DL (ref 8–23)
BUN SERPL-MCNC: 97 MG/DL (ref 8–23)
CALCIUM SERPL-MCNC: 6.9 MG/DL (ref 8.7–10.5)
CALCIUM SERPL-MCNC: 6.9 MG/DL (ref 8.7–10.5)
CALCIUM SERPL-MCNC: 7 MG/DL (ref 8.7–10.5)
CALCIUM SERPL-MCNC: 7 MG/DL (ref 8.7–10.5)
CALCIUM SERPL-MCNC: 7.1 MG/DL (ref 8.7–10.5)
CALCIUM SERPL-MCNC: 7.2 MG/DL (ref 8.7–10.5)
CALCIUM SERPL-MCNC: 7.2 MG/DL (ref 8.7–10.5)
CALCIUM SERPL-MCNC: 7.5 MG/DL (ref 8.7–10.5)
CALCIUM SERPL-MCNC: 8.3 MG/DL (ref 8.7–10.5)
CALCIUM SERPL-MCNC: 8.5 MG/DL (ref 8.7–10.5)
CALCIUM SERPL-MCNC: 9 MG/DL (ref 8.7–10.5)
CHLORIDE SERPL-SCNC: 102 MMOL/L (ref 95–110)
CHLORIDE SERPL-SCNC: 106 MMOL/L (ref 95–110)
CHLORIDE SERPL-SCNC: 107 MMOL/L (ref 95–110)
CHLORIDE SERPL-SCNC: 108 MMOL/L (ref 95–110)
CHLORIDE SERPL-SCNC: 110 MMOL/L (ref 95–110)
CHLORIDE SERPL-SCNC: 114 MMOL/L (ref 95–110)
CHLORIDE SERPL-SCNC: 96 MMOL/L (ref 95–110)
CHLORIDE SERPL-SCNC: 97 MMOL/L (ref 95–110)
CHLORIDE SERPL-SCNC: 97 MMOL/L (ref 95–110)
CO2 SERPL-SCNC: 16 MMOL/L (ref 23–29)
CO2 SERPL-SCNC: 19 MMOL/L (ref 23–29)
CO2 SERPL-SCNC: 20 MMOL/L (ref 23–29)
CO2 SERPL-SCNC: 20 MMOL/L (ref 23–29)
CO2 SERPL-SCNC: 21 MMOL/L (ref 23–29)
CO2 SERPL-SCNC: 22 MMOL/L (ref 23–29)
CODING SYSTEM: NORMAL
CREAT SERPL-MCNC: 5.6 MG/DL (ref 0.5–1.4)
CREAT SERPL-MCNC: 5.7 MG/DL (ref 0.5–1.4)
CREAT SERPL-MCNC: 5.9 MG/DL (ref 0.5–1.4)
CREAT SERPL-MCNC: 6.1 MG/DL (ref 0.5–1.4)
CREAT SERPL-MCNC: 6.1 MG/DL (ref 0.5–1.4)
CREAT SERPL-MCNC: 6.2 MG/DL (ref 0.5–1.4)
CREAT SERPL-MCNC: 6.3 MG/DL (ref 0.5–1.4)
CREAT SERPL-MCNC: 6.4 MG/DL (ref 0.5–1.4)
CREAT SERPL-MCNC: 6.5 MG/DL (ref 0.5–1.4)
CTP QC/QA: YES
CV ECHO LV RWT: 0.44 CM
CV PHARM DOSE: 0.4 MG
CV STRESS BASE HR: 104 BPM
DELSYS: ABNORMAL
DELSYS: ABNORMAL
DIASTOLIC BLOOD PRESSURE: 84 MMHG
DIFFERENTIAL METHOD: ABNORMAL
DISPENSE STATUS: NORMAL
DOP CALC AO PEAK VEL: 1.71 M/S
DOP CALC AO VTI: 27.61 CM
DOP CALC LVOT AREA: 4.3 CM2
DOP CALC LVOT DIAMETER: 2.35 CM
DOP CALC LVOT PEAK VEL: 0.67 M/S
DOP CALC LVOT STROKE VOLUME: 57.01 CM3
DOP CALCLVOT PEAK VEL VTI: 13.15 CM
ECHO LV POSTERIOR WALL: 1.13 CM (ref 0.6–1.1)
EJECTION FRACTION- HIGH: 65 %
EJECTION FRACTION: 60 %
END DIASTOLIC INDEX-HIGH: 153 ML/M2
END DIASTOLIC INDEX-LOW: 93 ML/M2
END SYSTOLIC INDEX-HIGH: 71 ML/M2
END SYSTOLIC INDEX-LOW: 31 ML/M2
EOSINOPHIL # BLD AUTO: 0 K/UL (ref 0–0.5)
EOSINOPHIL # BLD AUTO: 0.1 K/UL (ref 0–0.5)
EOSINOPHIL # BLD AUTO: 0.3 K/UL (ref 0–0.5)
EOSINOPHIL NFR BLD: 0.1 % (ref 0–8)
EOSINOPHIL NFR BLD: 0.2 % (ref 0–8)
EOSINOPHIL NFR BLD: 0.4 % (ref 0–8)
EOSINOPHIL NFR BLD: 0.6 % (ref 0–8)
EOSINOPHIL NFR BLD: 0.7 % (ref 0–8)
EOSINOPHIL NFR BLD: 0.8 % (ref 0–8)
EOSINOPHIL NFR BLD: 1 % (ref 0–8)
EOSINOPHIL NFR BLD: 1.2 % (ref 0–8)
EOSINOPHIL NFR BLD: 1.4 % (ref 0–8)
EOSINOPHIL NFR BLD: 1.6 % (ref 0–8)
EOSINOPHIL NFR BLD: 1.6 % (ref 0–8)
EOSINOPHIL NFR BLD: 2 % (ref 0–8)
EOSINOPHIL NFR BLD: 2.1 % (ref 0–8)
ERYTHROCYTE [DISTWIDTH] IN BLOOD BY AUTOMATED COUNT: 15.1 % (ref 11.5–14.5)
ERYTHROCYTE [DISTWIDTH] IN BLOOD BY AUTOMATED COUNT: 15.1 % (ref 11.5–14.5)
ERYTHROCYTE [DISTWIDTH] IN BLOOD BY AUTOMATED COUNT: 15.2 % (ref 11.5–14.5)
ERYTHROCYTE [DISTWIDTH] IN BLOOD BY AUTOMATED COUNT: 15.6 % (ref 11.5–14.5)
ERYTHROCYTE [DISTWIDTH] IN BLOOD BY AUTOMATED COUNT: 15.6 % (ref 11.5–14.5)
ERYTHROCYTE [DISTWIDTH] IN BLOOD BY AUTOMATED COUNT: 15.7 % (ref 11.5–14.5)
ERYTHROCYTE [DISTWIDTH] IN BLOOD BY AUTOMATED COUNT: 15.7 % (ref 11.5–14.5)
ERYTHROCYTE [DISTWIDTH] IN BLOOD BY AUTOMATED COUNT: 15.8 % (ref 11.5–14.5)
ERYTHROCYTE [DISTWIDTH] IN BLOOD BY AUTOMATED COUNT: 15.9 % (ref 11.5–14.5)
ERYTHROCYTE [DISTWIDTH] IN BLOOD BY AUTOMATED COUNT: 16 % (ref 11.5–14.5)
ERYTHROCYTE [DISTWIDTH] IN BLOOD BY AUTOMATED COUNT: 16.2 % (ref 11.5–14.5)
ERYTHROCYTE [DISTWIDTH] IN BLOOD BY AUTOMATED COUNT: 16.3 % (ref 11.5–14.5)
ERYTHROCYTE [DISTWIDTH] IN BLOOD BY AUTOMATED COUNT: 16.4 % (ref 11.5–14.5)
ERYTHROCYTE [DISTWIDTH] IN BLOOD BY AUTOMATED COUNT: 16.4 % (ref 11.5–14.5)
ERYTHROCYTE [DISTWIDTH] IN BLOOD BY AUTOMATED COUNT: 16.6 % (ref 11.5–14.5)
ERYTHROCYTE [DISTWIDTH] IN BLOOD BY AUTOMATED COUNT: 16.6 % (ref 11.5–14.5)
ERYTHROCYTE [DISTWIDTH] IN BLOOD BY AUTOMATED COUNT: 16.8 % (ref 11.5–14.5)
ERYTHROCYTE [DISTWIDTH] IN BLOOD BY AUTOMATED COUNT: 16.8 % (ref 11.5–14.5)
ERYTHROCYTE [DISTWIDTH] IN BLOOD BY AUTOMATED COUNT: 16.9 % (ref 11.5–14.5)
ERYTHROCYTE [DISTWIDTH] IN BLOOD BY AUTOMATED COUNT: 17 % (ref 11.5–14.5)
ERYTHROCYTE [DISTWIDTH] IN BLOOD BY AUTOMATED COUNT: 17 % (ref 11.5–14.5)
ERYTHROCYTE [SEDIMENTATION RATE] IN BLOOD BY WESTERGREN METHOD: 20 MM/H
ERYTHROCYTE [SEDIMENTATION RATE] IN BLOOD BY WESTERGREN METHOD: 400 MM/H
EST. GFR  (NO RACE VARIABLE): 10 ML/MIN/1.73 M^2
EST. GFR  (NO RACE VARIABLE): 10 ML/MIN/1.73 M^2
EST. GFR  (NO RACE VARIABLE): 11 ML/MIN/1.73 M^2
EST. GFR  (NO RACE VARIABLE): 9 ML/MIN/1.73 M^2
ESTIMATED AVG GLUCOSE: 114 MG/DL (ref 68–131)
EXT ALBUMIN: 2.1
EXT ALBUMIN: 2.8
EXT ALBUMIN: 2.8
EXT ALBUMIN: 3
EXT ALKALINE PHOSPHATASE: 115
EXT ALKALINE PHOSPHATASE: 51
EXT ALKALINE PHOSPHATASE: 70
EXT ALKALINE PHOSPHATASE: 73
EXT ALT: 28
EXT ALT: 34
EXT ALT: 5
EXT ALT: 9
EXT AST: 10
EXT AST: 10
EXT AST: 20
EXT AST: 23
EXT BILIRUBIN TOTAL: 0.3
EXT BILIRUBIN TOTAL: 0.3
EXT BILIRUBIN TOTAL: 0.4
EXT BILIRUBIN TOTAL: 0.56
EXT BUN: 40
EXT BUN: 45
EXT BUN: 56
EXT BUN: 62
EXT CALCIUM: 7.4
EXT CALCIUM: 8
EXT CALCIUM: 9
EXT CALCIUM: 9.4
EXT CHLORIDE: 103
EXT CHLORIDE: 104
EXT CHLORIDE: 105
EXT CHLORIDE: 98
EXT CO2: 23
EXT CO2: 24
EXT CO2: 26
EXT CO2: 26
EXT CREATININE: 6.1 MG/DL
EXT CREATININE: 6.3 MG/DL
EXT CREATININE: 6.9 MG/DL
EXT CREATININE: 8.07 MG/DL
EXT EOSINOPHIL%: 1.2
EXT EOSINOPHIL%: 1.5
EXT EOSINOPHIL%: 1.7
EXT EOSINOPHIL%: 3
EXT GFR MDRD NON AF AMER: 7
EXT GFR MDRD NON AF AMER: 8
EXT GFR MDRD NON AF AMER: 9
EXT GFR MDRD NON AF AMER: 9
EXT GLUCOSE: 119
EXT GLUCOSE: 134
EXT GLUCOSE: 136
EXT GLUCOSE: 150
EXT HEMATOCRIT: 10.1
EXT HEMATOCRIT: 25
EXT HEMATOCRIT: 27.5
EXT HEMATOCRIT: 28.8
EXT HEMOGLOBIN: 28.5
EXT HEMOGLOBIN: 8.3
EXT HEMOGLOBIN: 9.4
EXT HEMOGLOBIN: 9.8
EXT LYMPH%: 6
EXT LYMPH%: 8.2
EXT LYMPH%: 8.4
EXT LYMPH%: 9.1
EXT MAGNESIUM: 1.3
EXT MAGNESIUM: 1.7
EXT MAGNESIUM: 1.7
EXT MAGNESIUM: 1.8
EXT MONOCYTES%: 10.1
EXT MONOCYTES%: 7.6
EXT MONOCYTES%: 9.2
EXT MONOCYTES%: 9.9
EXT PLATELETS: 224
EXT PLATELETS: 226
EXT PLATELETS: 258
EXT PLATELETS: 258
EXT POTASSIUM: 3.2
EXT POTASSIUM: 4.9
EXT POTASSIUM: 5
EXT POTASSIUM: 5.9
EXT PROTEIN TOTAL: 5.3
EXT PROTEIN TOTAL: 5.7
EXT PROTEIN TOTAL: 7.2
EXT PROTEIN TOTAL: 7.6
EXT SEGS%: 78.5
EXT SEGS%: 79.8
EXT SEGS%: 80.2
EXT SEGS%: 82.6
EXT SODIUM: 134 MMOL/L
EXT SODIUM: 136 MMOL/L
EXT SODIUM: 138 MMOL/L
EXT SODIUM: 139 MMOL/L
EXT TACROLIMUS LVL: 4.3
EXT TACROLIMUS LVL: 4.7
EXT TACROLIMUS LVL: 5.3
EXT TACROLIMUS LVL: 9.7
EXT WBC: 5.7
EXT WBC: 7.3
EXT WBC: 8
FINAL PATHOLOGIC DIAGNOSIS: NORMAL
FIO2: 100
FIO2: 40
FRACTIONAL SHORTENING: 30 % (ref 28–44)
GAMMA GLOB SERPL ELPH-MCNC: 1.18 G/DL (ref 0.67–1.58)
GLUCOSE SERPL-MCNC: 103 MG/DL (ref 70–110)
GLUCOSE SERPL-MCNC: 120 MG/DL (ref 70–110)
GLUCOSE SERPL-MCNC: 129 MG/DL (ref 70–110)
GLUCOSE SERPL-MCNC: 138 MG/DL (ref 70–110)
GLUCOSE SERPL-MCNC: 149 MG/DL (ref 70–110)
GLUCOSE SERPL-MCNC: 192 MG/DL (ref 70–110)
GLUCOSE SERPL-MCNC: 208 MG/DL (ref 70–110)
GLUCOSE SERPL-MCNC: 224 MG/DL (ref 70–110)
GLUCOSE SERPL-MCNC: 250 MG/DL (ref 70–110)
GLUCOSE SERPL-MCNC: 256 MG/DL (ref 70–110)
GLUCOSE SERPL-MCNC: 97 MG/DL (ref 70–110)
GROSS: NORMAL
HBA1C MFR BLD: 5.6 % (ref 4–5.6)
HBV CORE AB SERPL QL IA: NEGATIVE
HBV SURFACE AB SER QL IA: NEGATIVE
HBV SURFACE AB SER-ACNC: NEGATIVE M[IU]/ML
HBV SURFACE AB SERPL IA-ACNC: <3 MIU/ML
HBV SURFACE AG SERPL QL IA: NEGATIVE
HCO3 UR-SCNC: 20.1 MMOL/L (ref 24–28)
HCO3 UR-SCNC: 22.1 MMOL/L (ref 24–28)
HCT VFR BLD AUTO: 18.2 % (ref 40–54)
HCT VFR BLD AUTO: 19.7 % (ref 40–54)
HCT VFR BLD AUTO: 19.7 % (ref 40–54)
HCT VFR BLD AUTO: 20.1 % (ref 40–54)
HCT VFR BLD AUTO: 20.2 % (ref 40–54)
HCT VFR BLD AUTO: 20.3 % (ref 40–54)
HCT VFR BLD AUTO: 21.5 % (ref 40–54)
HCT VFR BLD AUTO: 23.2 % (ref 40–54)
HCT VFR BLD AUTO: 23.4 % (ref 40–54)
HCT VFR BLD AUTO: 23.5 % (ref 40–54)
HCT VFR BLD AUTO: 23.9 % (ref 40–54)
HCT VFR BLD AUTO: 24 % (ref 40–54)
HCT VFR BLD AUTO: 24.1 % (ref 40–54)
HCT VFR BLD AUTO: 24.2 % (ref 40–54)
HCT VFR BLD AUTO: 24.2 % (ref 40–54)
HCT VFR BLD AUTO: 24.4 % (ref 40–54)
HCT VFR BLD AUTO: 24.4 % (ref 40–54)
HCT VFR BLD AUTO: 24.5 % (ref 40–54)
HCT VFR BLD AUTO: 24.7 % (ref 40–54)
HCT VFR BLD AUTO: 24.8 % (ref 40–54)
HCT VFR BLD AUTO: 25 % (ref 40–54)
HCT VFR BLD AUTO: 25.1 % (ref 40–54)
HCT VFR BLD AUTO: 25.4 % (ref 40–54)
HCT VFR BLD AUTO: 25.5 % (ref 40–54)
HCT VFR BLD AUTO: 26 % (ref 40–54)
HCT VFR BLD AUTO: 26.5 % (ref 40–54)
HCT VFR BLD AUTO: 27.4 % (ref 40–54)
HCT VFR BLD AUTO: 27.5 % (ref 40–54)
HCT VFR BLD AUTO: 28.1 % (ref 40–54)
HCT VFR BLD AUTO: 28.2 % (ref 40–54)
HCT VFR BLD AUTO: 29 % (ref 40–54)
HGB BLD-MCNC: 6.2 G/DL (ref 14–18)
HGB BLD-MCNC: 6.5 G/DL (ref 14–18)
HGB BLD-MCNC: 6.6 G/DL (ref 14–18)
HGB BLD-MCNC: 6.7 G/DL (ref 14–18)
HGB BLD-MCNC: 6.8 G/DL (ref 14–18)
HGB BLD-MCNC: 7 G/DL (ref 14–18)
HGB BLD-MCNC: 7.3 G/DL (ref 14–18)
HGB BLD-MCNC: 7.6 G/DL (ref 14–18)
HGB BLD-MCNC: 7.7 G/DL (ref 14–18)
HGB BLD-MCNC: 7.8 G/DL (ref 14–18)
HGB BLD-MCNC: 7.9 G/DL (ref 14–18)
HGB BLD-MCNC: 7.9 G/DL (ref 14–18)
HGB BLD-MCNC: 8.1 G/DL (ref 14–18)
HGB BLD-MCNC: 8.2 G/DL (ref 14–18)
HGB BLD-MCNC: 8.3 G/DL (ref 14–18)
HGB BLD-MCNC: 8.3 G/DL (ref 14–18)
HGB BLD-MCNC: 8.4 G/DL (ref 14–18)
HGB BLD-MCNC: 8.6 G/DL (ref 14–18)
HGB BLD-MCNC: 8.6 G/DL (ref 14–18)
HGB BLD-MCNC: 8.7 G/DL (ref 14–18)
HGB BLD-MCNC: 8.7 G/DL (ref 14–18)
HGB BLD-MCNC: 8.9 G/DL (ref 14–18)
HGB BLD-MCNC: 9 G/DL (ref 14–18)
HGB BLD-MCNC: 9.1 G/DL (ref 14–18)
HGB BLD-MCNC: 9.3 G/DL (ref 14–18)
HGB BLD-MCNC: 9.5 G/DL (ref 14–18)
IMM GRANULOCYTES # BLD AUTO: 0.04 K/UL (ref 0–0.04)
IMM GRANULOCYTES # BLD AUTO: 0.06 K/UL (ref 0–0.04)
IMM GRANULOCYTES # BLD AUTO: 0.09 K/UL (ref 0–0.04)
IMM GRANULOCYTES # BLD AUTO: 0.11 K/UL (ref 0–0.04)
IMM GRANULOCYTES # BLD AUTO: 0.13 K/UL (ref 0–0.04)
IMM GRANULOCYTES # BLD AUTO: 0.14 K/UL (ref 0–0.04)
IMM GRANULOCYTES # BLD AUTO: 0.16 K/UL (ref 0–0.04)
IMM GRANULOCYTES # BLD AUTO: 0.16 K/UL (ref 0–0.04)
IMM GRANULOCYTES # BLD AUTO: 0.2 K/UL (ref 0–0.04)
IMM GRANULOCYTES # BLD AUTO: 0.32 K/UL (ref 0–0.04)
IMM GRANULOCYTES # BLD AUTO: 0.5 K/UL (ref 0–0.04)
IMM GRANULOCYTES NFR BLD AUTO: 0.9 % (ref 0–0.5)
IMM GRANULOCYTES NFR BLD AUTO: 1.4 % (ref 0–0.5)
IMM GRANULOCYTES NFR BLD AUTO: 1.4 % (ref 0–0.5)
IMM GRANULOCYTES NFR BLD AUTO: 1.5 % (ref 0–0.5)
IMM GRANULOCYTES NFR BLD AUTO: 1.6 % (ref 0–0.5)
IMM GRANULOCYTES NFR BLD AUTO: 1.7 % (ref 0–0.5)
IMM GRANULOCYTES NFR BLD AUTO: 1.8 % (ref 0–0.5)
IMM GRANULOCYTES NFR BLD AUTO: 1.8 % (ref 0–0.5)
IMM GRANULOCYTES NFR BLD AUTO: 2 % (ref 0–0.5)
IMM GRANULOCYTES NFR BLD AUTO: 2.2 % (ref 0–0.5)
IMM GRANULOCYTES NFR BLD AUTO: 3 % (ref 0–0.5)
INR PPP: 1 (ref 0.8–1.2)
INTERPRETATION SERPL IFE-IMP: NORMAL
INTERVENTRICULAR SEPTUM: 1.1 CM (ref 0.6–1.1)
KAPPA LC SER QL IA: 15.85 MG/DL (ref 0.33–1.94)
KAPPA LC/LAMBDA SER IA: 1.05 (ref 0.26–1.65)
LA MAJOR: 5.4 CM
LA MINOR: 5.34 CM
LA WIDTH: 4.19 CM
LAMBDA LC SER QL IA: 15.16 MG/DL (ref 0.57–2.63)
LEFT ATRIUM SIZE: 3.36 CM
LEFT ATRIUM VOLUME INDEX MOD: 26.5 ML/M2
LEFT ATRIUM VOLUME INDEX: 27.3 ML/M2
LEFT ATRIUM VOLUME MOD: 62.35 CM3
LEFT ATRIUM VOLUME: 64.26 CM3
LEFT INTERNAL DIMENSION IN SYSTOLE: 3.62 CM (ref 2.1–4)
LEFT VENTRICLE DIASTOLIC VOLUME INDEX: 54.67 ML/M2
LEFT VENTRICLE DIASTOLIC VOLUME: 128.48 ML
LEFT VENTRICLE MASS INDEX: 95 G/M2
LEFT VENTRICLE SYSTOLIC VOLUME INDEX: 23.4 ML/M2
LEFT VENTRICLE SYSTOLIC VOLUME: 55.07 ML
LEFT VENTRICULAR INTERNAL DIMENSION IN DIASTOLE: 5.18 CM (ref 3.5–6)
LEFT VENTRICULAR MASS: 223.47 G
LYMPHOCYTES # BLD AUTO: 0.2 K/UL (ref 1–4.8)
LYMPHOCYTES # BLD AUTO: 0.3 K/UL (ref 1–4.8)
LYMPHOCYTES # BLD AUTO: 0.5 K/UL (ref 1–4.8)
LYMPHOCYTES # BLD AUTO: 0.6 K/UL (ref 1–4.8)
LYMPHOCYTES # BLD AUTO: 0.6 K/UL (ref 1–4.8)
LYMPHOCYTES # BLD AUTO: 0.7 K/UL (ref 1–4.8)
LYMPHOCYTES # BLD AUTO: 0.8 K/UL (ref 1–4.8)
LYMPHOCYTES # BLD AUTO: 0.9 K/UL (ref 1–4.8)
LYMPHOCYTES # BLD AUTO: 1.2 K/UL (ref 1–4.8)
LYMPHOCYTES # BLD AUTO: 2.2 K/UL (ref 1–4.8)
LYMPHOCYTES NFR BLD: 10.3 % (ref 18–48)
LYMPHOCYTES NFR BLD: 10.5 % (ref 18–48)
LYMPHOCYTES NFR BLD: 11.9 % (ref 18–48)
LYMPHOCYTES NFR BLD: 13.2 % (ref 18–48)
LYMPHOCYTES NFR BLD: 4.7 % (ref 18–48)
LYMPHOCYTES NFR BLD: 5.4 % (ref 18–48)
LYMPHOCYTES NFR BLD: 5.7 % (ref 18–48)
LYMPHOCYTES NFR BLD: 6.5 % (ref 18–48)
LYMPHOCYTES NFR BLD: 8.5 % (ref 18–48)
LYMPHOCYTES NFR BLD: 8.7 % (ref 18–48)
LYMPHOCYTES NFR BLD: 9 % (ref 18–48)
LYMPHOCYTES NFR BLD: 9.2 % (ref 18–48)
LYMPHOCYTES NFR BLD: 9.5 % (ref 18–48)
Lab: NORMAL
M TB IFN-G BLD-IMP: NEGATIVE
MAGNESIUM SERPL-MCNC: 1.2 MG/DL (ref 1.6–2.6)
MAGNESIUM SERPL-MCNC: 1.4 MG/DL (ref 1.6–2.6)
MAGNESIUM SERPL-MCNC: 1.5 MG/DL (ref 1.6–2.6)
MAGNESIUM SERPL-MCNC: 1.6 MG/DL (ref 1.6–2.6)
MAGNESIUM SERPL-MCNC: 1.7 MG/DL (ref 1.6–2.6)
MAGNESIUM SERPL-MCNC: 1.8 MG/DL (ref 1.6–2.6)
MAGNESIUM SERPL-MCNC: 1.8 MG/DL (ref 1.6–2.6)
MAGNESIUM SERPL-MCNC: 2.1 MG/DL (ref 1.6–2.6)
MAGNESIUM SERPL-MCNC: 2.2 MG/DL (ref 1.6–2.6)
MAGNESIUM SERPL-MCNC: 2.5 MG/DL (ref 1.6–2.6)
MCH RBC QN AUTO: 29.4 PG (ref 27–31)
MCH RBC QN AUTO: 29.4 PG (ref 27–31)
MCH RBC QN AUTO: 29.6 PG (ref 27–31)
MCH RBC QN AUTO: 29.7 PG (ref 27–31)
MCH RBC QN AUTO: 29.8 PG (ref 27–31)
MCH RBC QN AUTO: 29.9 PG (ref 27–31)
MCH RBC QN AUTO: 30 PG (ref 27–31)
MCH RBC QN AUTO: 30 PG (ref 27–31)
MCH RBC QN AUTO: 30.1 PG (ref 27–31)
MCH RBC QN AUTO: 30.2 PG (ref 27–31)
MCH RBC QN AUTO: 30.2 PG (ref 27–31)
MCH RBC QN AUTO: 30.4 PG (ref 27–31)
MCH RBC QN AUTO: 30.5 PG (ref 27–31)
MCH RBC QN AUTO: 30.8 PG (ref 27–31)
MCH RBC QN AUTO: 31 PG (ref 27–31)
MCH RBC QN AUTO: 31.1 PG (ref 27–31)
MCH RBC QN AUTO: 31.2 PG (ref 27–31)
MCH RBC QN AUTO: 31.3 PG (ref 27–31)
MCH RBC QN AUTO: 31.3 PG (ref 27–31)
MCH RBC QN AUTO: 31.4 PG (ref 27–31)
MCH RBC QN AUTO: 31.4 PG (ref 27–31)
MCH RBC QN AUTO: 31.8 PG (ref 27–31)
MCHC RBC AUTO-ENTMCNC: 31.5 G/DL (ref 32–36)
MCHC RBC AUTO-ENTMCNC: 31.7 G/DL (ref 32–36)
MCHC RBC AUTO-ENTMCNC: 31.8 G/DL (ref 32–36)
MCHC RBC AUTO-ENTMCNC: 32 G/DL (ref 32–36)
MCHC RBC AUTO-ENTMCNC: 32.2 G/DL (ref 32–36)
MCHC RBC AUTO-ENTMCNC: 32.3 G/DL (ref 32–36)
MCHC RBC AUTO-ENTMCNC: 32.3 G/DL (ref 32–36)
MCHC RBC AUTO-ENTMCNC: 32.5 G/DL (ref 32–36)
MCHC RBC AUTO-ENTMCNC: 32.7 G/DL (ref 32–36)
MCHC RBC AUTO-ENTMCNC: 32.8 G/DL (ref 32–36)
MCHC RBC AUTO-ENTMCNC: 33.2 G/DL (ref 32–36)
MCHC RBC AUTO-ENTMCNC: 33.2 G/DL (ref 32–36)
MCHC RBC AUTO-ENTMCNC: 33.3 G/DL (ref 32–36)
MCHC RBC AUTO-ENTMCNC: 33.5 G/DL (ref 32–36)
MCHC RBC AUTO-ENTMCNC: 33.6 G/DL (ref 32–36)
MCHC RBC AUTO-ENTMCNC: 33.7 G/DL (ref 32–36)
MCHC RBC AUTO-ENTMCNC: 33.8 G/DL (ref 32–36)
MCHC RBC AUTO-ENTMCNC: 33.8 G/DL (ref 32–36)
MCHC RBC AUTO-ENTMCNC: 33.9 G/DL (ref 32–36)
MCHC RBC AUTO-ENTMCNC: 34 G/DL (ref 32–36)
MCHC RBC AUTO-ENTMCNC: 34.1 G/DL (ref 32–36)
MCHC RBC AUTO-ENTMCNC: 34.1 G/DL (ref 32–36)
MCHC RBC AUTO-ENTMCNC: 34.3 G/DL (ref 32–36)
MCHC RBC AUTO-ENTMCNC: 34.3 G/DL (ref 32–36)
MCHC RBC AUTO-ENTMCNC: 34.7 G/DL (ref 32–36)
MCHC RBC AUTO-ENTMCNC: 34.7 G/DL (ref 32–36)
MCHC RBC AUTO-ENTMCNC: 34.8 G/DL (ref 32–36)
MCV RBC AUTO: 86 FL (ref 82–98)
MCV RBC AUTO: 87 FL (ref 82–98)
MCV RBC AUTO: 88 FL (ref 82–98)
MCV RBC AUTO: 89 FL (ref 82–98)
MCV RBC AUTO: 90 FL (ref 82–98)
MCV RBC AUTO: 91 FL (ref 82–98)
MCV RBC AUTO: 92 FL (ref 82–98)
MCV RBC AUTO: 93 FL (ref 82–98)
MCV RBC AUTO: 94 FL (ref 82–98)
MCV RBC AUTO: 95 FL (ref 82–98)
MCV RBC AUTO: 96 FL (ref 82–98)
MCV RBC AUTO: 97 FL (ref 82–98)
MCV RBC AUTO: 97 FL (ref 82–98)
MODE: ABNORMAL
MODE: ABNORMAL
MONOCYTES # BLD AUTO: 0.4 K/UL (ref 0.3–1)
MONOCYTES # BLD AUTO: 0.6 K/UL (ref 0.3–1)
MONOCYTES # BLD AUTO: 0.7 K/UL (ref 0.3–1)
MONOCYTES # BLD AUTO: 0.9 K/UL (ref 0.3–1)
MONOCYTES # BLD AUTO: 1 K/UL (ref 0.3–1)
MONOCYTES # BLD AUTO: 1 K/UL (ref 0.3–1)
MONOCYTES # BLD AUTO: 1.7 K/UL (ref 0.3–1)
MONOCYTES # BLD AUTO: 1.9 K/UL (ref 0.3–1)
MONOCYTES NFR BLD: 10.3 % (ref 4–15)
MONOCYTES NFR BLD: 10.3 % (ref 4–15)
MONOCYTES NFR BLD: 10.7 % (ref 4–15)
MONOCYTES NFR BLD: 10.7 % (ref 4–15)
MONOCYTES NFR BLD: 11 % (ref 4–15)
MONOCYTES NFR BLD: 11.7 % (ref 4–15)
MONOCYTES NFR BLD: 11.7 % (ref 4–15)
MONOCYTES NFR BLD: 11.8 % (ref 4–15)
MONOCYTES NFR BLD: 14.5 % (ref 4–15)
MONOCYTES NFR BLD: 7.8 % (ref 4–15)
MONOCYTES NFR BLD: 8.2 % (ref 4–15)
MONOCYTES NFR BLD: 8.4 % (ref 4–15)
MONOCYTES NFR BLD: 9.8 % (ref 4–15)
NEUTROPHILS # BLD AUTO: 11.7 K/UL (ref 1.8–7.7)
NEUTROPHILS # BLD AUTO: 11.9 K/UL (ref 1.8–7.7)
NEUTROPHILS # BLD AUTO: 3.3 K/UL (ref 1.8–7.7)
NEUTROPHILS # BLD AUTO: 3.4 K/UL (ref 1.8–7.7)
NEUTROPHILS # BLD AUTO: 4.8 K/UL (ref 1.8–7.7)
NEUTROPHILS # BLD AUTO: 4.9 K/UL (ref 1.8–7.7)
NEUTROPHILS # BLD AUTO: 5.1 K/UL (ref 1.8–7.7)
NEUTROPHILS # BLD AUTO: 6 K/UL (ref 1.8–7.7)
NEUTROPHILS # BLD AUTO: 6.3 K/UL (ref 1.8–7.7)
NEUTROPHILS # BLD AUTO: 6.4 K/UL (ref 1.8–7.7)
NEUTROPHILS # BLD AUTO: 6.9 K/UL (ref 1.8–7.7)
NEUTROPHILS # BLD AUTO: 9.8 K/UL (ref 1.8–7.7)
NEUTROPHILS # BLD AUTO: 9.8 K/UL (ref 1.8–7.7)
NEUTROPHILS NFR BLD: 71 % (ref 38–73)
NEUTROPHILS NFR BLD: 74.5 % (ref 38–73)
NEUTROPHILS NFR BLD: 74.9 % (ref 38–73)
NEUTROPHILS NFR BLD: 75.5 % (ref 38–73)
NEUTROPHILS NFR BLD: 76.3 % (ref 38–73)
NEUTROPHILS NFR BLD: 76.8 % (ref 38–73)
NEUTROPHILS NFR BLD: 77.4 % (ref 38–73)
NEUTROPHILS NFR BLD: 78.3 % (ref 38–73)
NEUTROPHILS NFR BLD: 78.3 % (ref 38–73)
NEUTROPHILS NFR BLD: 78.4 % (ref 38–73)
NEUTROPHILS NFR BLD: 80 % (ref 38–73)
NEUTROPHILS NFR BLD: 80.9 % (ref 38–73)
NEUTROPHILS NFR BLD: 85.6 % (ref 38–73)
NRBC BLD-RTO: 0 /100 WBC
NUM UNITS TRANS FFP: NORMAL
NUM UNITS TRANS PACKED RBC: NORMAL
OHS CV CPX 1 MINUTE RECOVERY HEART RATE: 113 BPM
OHS CV CPX 85 PERCENT MAX PREDICTED HEART RATE MALE: 131
OHS CV CPX MAX PREDICTED HEART RATE: 154
OHS CV CPX PATIENT IS FEMALE: 0
OHS CV CPX PATIENT IS MALE: 1
OHS CV CPX PEAK DIASTOLIC BLOOD PRESSURE: 77 MMHG
OHS CV CPX PEAK HEAR RATE: 108 BPM
OHS CV CPX PEAK RATE PRESSURE PRODUCT: NORMAL
OHS CV CPX PEAK SYSTOLIC BLOOD PRESSURE: 143 MMHG
OHS CV CPX PERCENT MAX PREDICTED HEART RATE ACHIEVED: 70
OHS CV CPX RATE PRESSURE PRODUCT PRESENTING: NORMAL
PATHOLOGIST INTERPRETATION IFE: NORMAL
PATHOLOGIST INTERPRETATION SPE: NORMAL
PCO2 BLDA: 35.4 MMHG (ref 35–45)
PCO2 BLDA: 38.1 MMHG (ref 35–45)
PEEP: 5
PEEP: 5
PH SMN: 7.36 [PH] (ref 7.35–7.45)
PH SMN: 7.37 [PH] (ref 7.35–7.45)
PHOSPHATE SERPL-MCNC: 2.3 MG/DL (ref 2.7–4.5)
PHOSPHATE SERPL-MCNC: 2.5 MG/DL (ref 2.7–4.5)
PHOSPHATE SERPL-MCNC: 3.5 MG/DL (ref 2.7–4.5)
PHOSPHATE SERPL-MCNC: 3.6 MG/DL (ref 2.7–4.5)
PHOSPHATE SERPL-MCNC: 3.6 MG/DL (ref 2.7–4.5)
PHOSPHATE SERPL-MCNC: 4.1 MG/DL (ref 2.7–4.5)
PHOSPHATE SERPL-MCNC: 4.3 MG/DL (ref 2.7–4.5)
PHOSPHATE SERPL-MCNC: 4.5 MG/DL (ref 2.7–4.5)
PLATELET # BLD AUTO: 103 K/UL (ref 150–450)
PLATELET # BLD AUTO: 105 K/UL (ref 150–450)
PLATELET # BLD AUTO: 115 K/UL (ref 150–450)
PLATELET # BLD AUTO: 115 K/UL (ref 150–450)
PLATELET # BLD AUTO: 116 K/UL (ref 150–450)
PLATELET # BLD AUTO: 120 K/UL (ref 150–450)
PLATELET # BLD AUTO: 123 K/UL (ref 150–450)
PLATELET # BLD AUTO: 128 K/UL (ref 150–450)
PLATELET # BLD AUTO: 130 K/UL (ref 150–450)
PLATELET # BLD AUTO: 140 K/UL (ref 150–450)
PLATELET # BLD AUTO: 142 K/UL (ref 150–450)
PLATELET # BLD AUTO: 143 K/UL (ref 150–450)
PLATELET # BLD AUTO: 148 K/UL (ref 150–450)
PLATELET # BLD AUTO: 152 K/UL (ref 150–450)
PLATELET # BLD AUTO: 179 K/UL (ref 150–450)
PLATELET # BLD AUTO: 179 K/UL (ref 150–450)
PLATELET # BLD AUTO: 182 K/UL (ref 150–450)
PLATELET # BLD AUTO: 196 K/UL (ref 150–450)
PLATELET # BLD AUTO: 274 K/UL (ref 150–450)
PLATELET # BLD AUTO: 82 K/UL (ref 150–450)
PLATELET # BLD AUTO: 85 K/UL (ref 150–450)
PLATELET # BLD AUTO: 86 K/UL (ref 150–450)
PLATELET # BLD AUTO: 89 K/UL (ref 150–450)
PLATELET # BLD AUTO: 89 K/UL (ref 150–450)
PLATELET # BLD AUTO: 90 K/UL (ref 150–450)
PLATELET # BLD AUTO: 90 K/UL (ref 150–450)
PLATELET # BLD AUTO: 91 K/UL (ref 150–450)
PLATELET # BLD AUTO: 91 K/UL (ref 150–450)
PLATELET # BLD AUTO: 93 K/UL (ref 150–450)
PLATELET BLD QL SMEAR: ABNORMAL
PMV BLD AUTO: 8.3 FL (ref 9.2–12.9)
PMV BLD AUTO: 8.3 FL (ref 9.2–12.9)
PMV BLD AUTO: 8.6 FL (ref 9.2–12.9)
PMV BLD AUTO: 8.7 FL (ref 9.2–12.9)
PMV BLD AUTO: 8.7 FL (ref 9.2–12.9)
PMV BLD AUTO: 8.8 FL (ref 9.2–12.9)
PMV BLD AUTO: 8.8 FL (ref 9.2–12.9)
PMV BLD AUTO: 8.9 FL (ref 9.2–12.9)
PMV BLD AUTO: 9 FL (ref 9.2–12.9)
PMV BLD AUTO: 9.1 FL (ref 9.2–12.9)
PMV BLD AUTO: 9.2 FL (ref 9.2–12.9)
PMV BLD AUTO: 9.2 FL (ref 9.2–12.9)
PMV BLD AUTO: 9.3 FL (ref 9.2–12.9)
PMV BLD AUTO: 9.4 FL (ref 9.2–12.9)
PMV BLD AUTO: 9.9 FL (ref 9.2–12.9)
PO2 BLDA: 200 MMHG (ref 80–100)
PO2 BLDA: 455 MMHG (ref 80–100)
POC ACTIVATED CLOTTING TIME K: 115 SEC (ref 74–137)
POC BE: -3 MMOL/L
POC BE: -5 MMOL/L
POC SATURATED O2: 100 % (ref 95–100)
POC SATURATED O2: 100 % (ref 95–100)
POCT GLUCOSE: 105 MG/DL (ref 70–110)
POCT GLUCOSE: 106 MG/DL (ref 70–110)
POCT GLUCOSE: 108 MG/DL (ref 70–110)
POCT GLUCOSE: 112 MG/DL (ref 70–110)
POCT GLUCOSE: 116 MG/DL (ref 70–110)
POCT GLUCOSE: 123 MG/DL (ref 70–110)
POCT GLUCOSE: 125 MG/DL (ref 70–110)
POCT GLUCOSE: 133 MG/DL (ref 70–110)
POCT GLUCOSE: 135 MG/DL (ref 70–110)
POCT GLUCOSE: 136 MG/DL (ref 70–110)
POCT GLUCOSE: 142 MG/DL (ref 70–110)
POCT GLUCOSE: 148 MG/DL (ref 70–110)
POCT GLUCOSE: 149 MG/DL (ref 70–110)
POCT GLUCOSE: 149 MG/DL (ref 70–110)
POCT GLUCOSE: 151 MG/DL (ref 70–110)
POCT GLUCOSE: 161 MG/DL (ref 70–110)
POCT GLUCOSE: 162 MG/DL (ref 70–110)
POCT GLUCOSE: 164 MG/DL (ref 70–110)
POCT GLUCOSE: 171 MG/DL (ref 70–110)
POCT GLUCOSE: 175 MG/DL (ref 70–110)
POCT GLUCOSE: 204 MG/DL (ref 70–110)
POCT GLUCOSE: 214 MG/DL (ref 70–110)
POCT GLUCOSE: 221 MG/DL (ref 70–110)
POCT GLUCOSE: 231 MG/DL (ref 70–110)
POCT GLUCOSE: 255 MG/DL (ref 70–110)
POCT GLUCOSE: 256 MG/DL (ref 70–110)
POCT GLUCOSE: 262 MG/DL (ref 70–110)
POCT GLUCOSE: 264 MG/DL (ref 70–110)
POCT GLUCOSE: 295 MG/DL (ref 70–110)
POCT GLUCOSE: 297 MG/DL (ref 70–110)
POTASSIUM SERPL-SCNC: 3.2 MMOL/L (ref 3.5–5.1)
POTASSIUM SERPL-SCNC: 3.3 MMOL/L (ref 3.5–5.1)
POTASSIUM SERPL-SCNC: 3.3 MMOL/L (ref 3.5–5.1)
POTASSIUM SERPL-SCNC: 3.5 MMOL/L (ref 3.5–5.1)
POTASSIUM SERPL-SCNC: 3.5 MMOL/L (ref 3.5–5.1)
POTASSIUM SERPL-SCNC: 3.7 MMOL/L (ref 3.5–5.1)
POTASSIUM SERPL-SCNC: 3.8 MMOL/L (ref 3.5–5.1)
POTASSIUM SERPL-SCNC: 4 MMOL/L (ref 3.5–5.1)
POTASSIUM SERPL-SCNC: 4 MMOL/L (ref 3.5–5.1)
POTASSIUM SERPL-SCNC: 4.1 MMOL/L (ref 3.5–5.1)
POTASSIUM SERPL-SCNC: 4.1 MMOL/L (ref 3.5–5.1)
PROT SERPL-MCNC: 3.4 G/DL (ref 6–8.4)
PROT SERPL-MCNC: 3.6 G/DL (ref 6–8.4)
PROT SERPL-MCNC: 3.6 G/DL (ref 6–8.4)
PROT SERPL-MCNC: 3.7 G/DL (ref 6–8.4)
PROT SERPL-MCNC: 3.8 G/DL (ref 6–8.4)
PROT SERPL-MCNC: 4.4 G/DL (ref 6–8.4)
PROT SERPL-MCNC: 6.1 G/DL (ref 6–8.4)
PROT SERPL-MCNC: 6.8 G/DL (ref 6–8.4)
PROTHROMBIN TIME: 10.9 SEC (ref 9–12.5)
PROTHROMBIN TIME: 11.1 SEC (ref 9–12.5)
PROTHROMBIN TIME: 11.2 SEC (ref 9–12.5)
PROTHROMBIN TIME: 11.2 SEC (ref 9–12.5)
PTH-INTACT SERPL-MCNC: 235.2 PG/ML (ref 9–77)
RA MAJOR: 4.36 CM
RA PRESSURE: 3 MMHG
RA WIDTH: 3.79 CM
RBC # BLD AUTO: 2.08 M/UL (ref 4.6–6.2)
RBC # BLD AUTO: 2.14 M/UL (ref 4.6–6.2)
RBC # BLD AUTO: 2.28 M/UL (ref 4.6–6.2)
RBC # BLD AUTO: 2.32 M/UL (ref 4.6–6.2)
RBC # BLD AUTO: 2.46 M/UL (ref 4.6–6.2)
RBC # BLD AUTO: 2.52 M/UL (ref 4.6–6.2)
RBC # BLD AUTO: 2.59 M/UL (ref 4.6–6.2)
RBC # BLD AUTO: 2.61 M/UL (ref 4.6–6.2)
RBC # BLD AUTO: 2.62 M/UL (ref 4.6–6.2)
RBC # BLD AUTO: 2.63 M/UL (ref 4.6–6.2)
RBC # BLD AUTO: 2.65 M/UL (ref 4.6–6.2)
RBC # BLD AUTO: 2.66 M/UL (ref 4.6–6.2)
RBC # BLD AUTO: 2.67 M/UL (ref 4.6–6.2)
RBC # BLD AUTO: 2.67 M/UL (ref 4.6–6.2)
RBC # BLD AUTO: 2.7 M/UL (ref 4.6–6.2)
RBC # BLD AUTO: 2.71 M/UL (ref 4.6–6.2)
RBC # BLD AUTO: 2.71 M/UL (ref 4.6–6.2)
RBC # BLD AUTO: 2.74 M/UL (ref 4.6–6.2)
RBC # BLD AUTO: 2.76 M/UL (ref 4.6–6.2)
RBC # BLD AUTO: 2.76 M/UL (ref 4.6–6.2)
RBC # BLD AUTO: 2.77 M/UL (ref 4.6–6.2)
RBC # BLD AUTO: 2.8 M/UL (ref 4.6–6.2)
RBC # BLD AUTO: 2.81 M/UL (ref 4.6–6.2)
RBC # BLD AUTO: 2.83 M/UL (ref 4.6–6.2)
RBC # BLD AUTO: 2.9 M/UL (ref 4.6–6.2)
RBC # BLD AUTO: 2.91 M/UL (ref 4.6–6.2)
RBC # BLD AUTO: 2.92 M/UL (ref 4.6–6.2)
RBC # BLD AUTO: 2.97 M/UL (ref 4.6–6.2)
RBC # BLD AUTO: 3.23 M/UL (ref 4.6–6.2)
RETIRED EF AND QEF - SEE NOTES: 53 %
RHEUMATOID FACT SERPL-ACNC: <13 IU/ML (ref 0–15)
RIGHT VENTRICULAR END-DIASTOLIC DIMENSION: 3.38 CM
RV TISSUE DOPPLER FREE WALL SYSTOLIC VELOCITY 1 (APICAL 4 CHAMBER VIEW): 13.01 CM/S
SAMPLE: ABNORMAL
SAMPLE: ABNORMAL
SAMPLE: NORMAL
SARS-COV-2 RDRP RESP QL NAA+PROBE: NEGATIVE
SINUS: 3.61 CM
SITE: ABNORMAL
SITE: ABNORMAL
SODIUM SERPL-SCNC: 133 MMOL/L (ref 136–145)
SODIUM SERPL-SCNC: 135 MMOL/L (ref 136–145)
SODIUM SERPL-SCNC: 135 MMOL/L (ref 136–145)
SODIUM SERPL-SCNC: 136 MMOL/L (ref 136–145)
SODIUM SERPL-SCNC: 136 MMOL/L (ref 136–145)
SODIUM SERPL-SCNC: 137 MMOL/L (ref 136–145)
SODIUM SERPL-SCNC: 140 MMOL/L (ref 136–145)
SODIUM SERPL-SCNC: 140 MMOL/L (ref 136–145)
STJ: 3.03 CM
SUMMED DIFFERENCE: 1
SUMMED REST SCORE: 4
SUMMED STRESS SCORE: 5
SYSTOLIC BLOOD PRESSURE: 154 MMHG
TACROLIMUS BLD-MCNC: 2.2 NG/ML (ref 5–15)
TACROLIMUS BLD-MCNC: 3.9 NG/ML (ref 5–15)
TACROLIMUS BLD-MCNC: 5 NG/ML (ref 5–15)
TACROLIMUS BLD-MCNC: 5.8 NG/ML (ref 5–15)
TDI LATERAL: 0.07 M/S
TDI SEPTAL: 0.14 M/S
TDI: 0.11 M/S
TRICUSPID ANNULAR PLANE SYSTOLIC EXCURSION: 1.48 CM
UNIT NUMBER: NORMAL
VT: 20
VT: 400
WBC # BLD AUTO: 10.72 K/UL (ref 3.9–12.7)
WBC # BLD AUTO: 11.39 K/UL (ref 3.9–12.7)
WBC # BLD AUTO: 11.46 K/UL (ref 3.9–12.7)
WBC # BLD AUTO: 11.7 K/UL (ref 3.9–12.7)
WBC # BLD AUTO: 12.33 K/UL (ref 3.9–12.7)
WBC # BLD AUTO: 12.49 K/UL (ref 3.9–12.7)
WBC # BLD AUTO: 14.84 K/UL (ref 3.9–12.7)
WBC # BLD AUTO: 16.52 K/UL (ref 3.9–12.7)
WBC # BLD AUTO: 3.5 K/UL (ref 3.9–12.7)
WBC # BLD AUTO: 3.9 K/UL (ref 3.9–12.7)
WBC # BLD AUTO: 4.04 K/UL (ref 3.9–12.7)
WBC # BLD AUTO: 4.11 K/UL (ref 3.9–12.7)
WBC # BLD AUTO: 4.49 K/UL (ref 3.9–12.7)
WBC # BLD AUTO: 4.88 K/UL (ref 3.9–12.7)
WBC # BLD AUTO: 5.21 K/UL (ref 3.9–12.7)
WBC # BLD AUTO: 5.64 K/UL (ref 3.9–12.7)
WBC # BLD AUTO: 5.94 K/UL (ref 3.9–12.7)
WBC # BLD AUTO: 6.2 K/UL (ref 3.9–12.7)
WBC # BLD AUTO: 6.54 K/UL (ref 3.9–12.7)
WBC # BLD AUTO: 6.55 K/UL (ref 3.9–12.7)
WBC # BLD AUTO: 7.57 K/UL (ref 3.9–12.7)
WBC # BLD AUTO: 7.85 K/UL (ref 3.9–12.7)
WBC # BLD AUTO: 8.08 K/UL (ref 3.9–12.7)
WBC # BLD AUTO: 8.22 K/UL (ref 3.9–12.7)
WBC # BLD AUTO: 8.31 K/UL (ref 3.9–12.7)
WBC # BLD AUTO: 8.85 K/UL (ref 3.9–12.7)
WBC # BLD AUTO: 9.23 K/UL (ref 3.9–12.7)
WBC # BLD AUTO: 9.31 K/UL (ref 3.9–12.7)
WBC # BLD AUTO: 9.63 K/UL (ref 3.9–12.7)

## 2022-01-01 PROCEDURE — 93306 TTE W/DOPPLER COMPLETE: CPT | Mod: TXP

## 2022-01-01 PROCEDURE — 21400001 HC TELEMETRY ROOM: Mod: NTX

## 2022-01-01 PROCEDURE — 83735 ASSAY OF MAGNESIUM: CPT | Mod: NTX | Performed by: NURSE PRACTITIONER

## 2022-01-01 PROCEDURE — 63600175 PHARM REV CODE 636 W HCPCS: Mod: NTX | Performed by: INTERNAL MEDICINE

## 2022-01-01 PROCEDURE — 25000003 PHARM REV CODE 250: Mod: NTX | Performed by: INTERNAL MEDICINE

## 2022-01-01 PROCEDURE — 90947 DIALYSIS REPEATED EVAL: CPT | Mod: NTX,,, | Performed by: INTERNAL MEDICINE

## 2022-01-01 PROCEDURE — 86920 COMPATIBILITY TEST SPIN: CPT | Mod: NTX | Performed by: FAMILY MEDICINE

## 2022-01-01 PROCEDURE — 63600175 PHARM REV CODE 636 W HCPCS: Mod: NTX | Performed by: NURSE PRACTITIONER

## 2022-01-01 PROCEDURE — P9035 PLATELET PHERES LEUKOREDUCED: HCPCS | Mod: NTX | Performed by: INTERNAL MEDICINE

## 2022-01-01 PROCEDURE — 90945 DIALYSIS ONE EVALUATION: CPT | Mod: NTX,,, | Performed by: INTERNAL MEDICINE

## 2022-01-01 PROCEDURE — 99214 OFFICE O/P EST MOD 30 MIN: CPT | Mod: PBBFAC,TXP | Performed by: NURSE PRACTITIONER

## 2022-01-01 PROCEDURE — 85014 HEMATOCRIT: CPT | Mod: 91,NTX | Performed by: FAMILY MEDICINE

## 2022-01-01 PROCEDURE — 43235 EGD DIAGNOSTIC BRUSH WASH: CPT | Mod: NTX,,, | Performed by: INTERNAL MEDICINE

## 2022-01-01 PROCEDURE — 37799 UNLISTED PX VASCULAR SURGERY: CPT | Mod: NTX

## 2022-01-01 PROCEDURE — 25000003 PHARM REV CODE 250: Mod: NTX | Performed by: NURSE PRACTITIONER

## 2022-01-01 PROCEDURE — 90945 PR DIALYSIS, NOT HEMO, 1 EVAL: ICD-10-PCS | Mod: NTX,,, | Performed by: INTERNAL MEDICINE

## 2022-01-01 PROCEDURE — 99223 1ST HOSP IP/OBS HIGH 75: CPT | Mod: 25,NTX,, | Performed by: INTERNAL MEDICINE

## 2022-01-01 PROCEDURE — C9113 INJ PANTOPRAZOLE SODIUM, VIA: HCPCS | Mod: NTX | Performed by: NURSE PRACTITIONER

## 2022-01-01 PROCEDURE — 99223 PR INITIAL HOSPITAL CARE,LEVL III: ICD-10-PCS | Mod: NTX,,, | Performed by: PHYSICIAN ASSISTANT

## 2022-01-01 PROCEDURE — 82803 BLOOD GASES ANY COMBINATION: CPT | Mod: NTX

## 2022-01-01 PROCEDURE — 63600175 PHARM REV CODE 636 W HCPCS: Mod: NTX | Performed by: FAMILY MEDICINE

## 2022-01-01 PROCEDURE — 99232 PR SUBSEQUENT HOSPITAL CARE,LEVL II: ICD-10-PCS | Mod: NTX,,, | Performed by: INTERNAL MEDICINE

## 2022-01-01 PROCEDURE — 99213 PR OFFICE/OUTPT VISIT, EST, LEVL III, 20-29 MIN: ICD-10-PCS | Mod: S$PBB,NTX,, | Performed by: INTERNAL MEDICINE

## 2022-01-01 PROCEDURE — 76770 US RETROPERITONEAL COMPLETE: ICD-10-PCS | Mod: 26,TXP,, | Performed by: RADIOLOGY

## 2022-01-01 PROCEDURE — 99233 PR SUBSEQUENT HOSPITAL CARE,LEVL III: ICD-10-PCS | Mod: NTX,,, | Performed by: INTERNAL MEDICINE

## 2022-01-01 PROCEDURE — 25000003 PHARM REV CODE 250: Mod: NTX

## 2022-01-01 PROCEDURE — U0002 COVID-19 LAB TEST NON-CDC: HCPCS | Mod: NTX | Performed by: EMERGENCY MEDICINE

## 2022-01-01 PROCEDURE — 99233 SBSQ HOSP IP/OBS HIGH 50: CPT | Mod: NTX,,, | Performed by: INTERNAL MEDICINE

## 2022-01-01 PROCEDURE — 36415 COLL VENOUS BLD VENIPUNCTURE: CPT | Mod: NTX | Performed by: INTERNAL MEDICINE

## 2022-01-01 PROCEDURE — 63600175 PHARM REV CODE 636 W HCPCS: Mod: NTX | Performed by: STUDENT IN AN ORGANIZED HEALTH CARE EDUCATION/TRAINING PROGRAM

## 2022-01-01 PROCEDURE — 99291 CRITICAL CARE FIRST HOUR: CPT | Mod: NTX,,, | Performed by: INTERNAL MEDICINE

## 2022-01-01 PROCEDURE — 90947 PR DIALYSIS, REPEATED EVAL.: ICD-10-PCS | Mod: NTX,,, | Performed by: INTERNAL MEDICINE

## 2022-01-01 PROCEDURE — 90945 DIALYSIS ONE EVALUATION: CPT | Mod: NTX

## 2022-01-01 PROCEDURE — 37000009 HC ANESTHESIA EA ADD 15 MINS: Mod: NTX | Performed by: INTERNAL MEDICINE

## 2022-01-01 PROCEDURE — 99213 OFFICE O/P EST LOW 20 MIN: CPT | Mod: S$PBB,NTX,, | Performed by: INTERNAL MEDICINE

## 2022-01-01 PROCEDURE — 80053 COMPREHEN METABOLIC PANEL: CPT | Mod: NTX

## 2022-01-01 PROCEDURE — 80069 RENAL FUNCTION PANEL: CPT | Mod: NTX | Performed by: INTERNAL MEDICINE

## 2022-01-01 PROCEDURE — 99223 PR INITIAL HOSPITAL CARE,LEVL III: ICD-10-PCS | Mod: 25,NTX,, | Performed by: INTERNAL MEDICINE

## 2022-01-01 PROCEDURE — 36415 COLL VENOUS BLD VENIPUNCTURE: CPT | Mod: NTX | Performed by: NURSE PRACTITIONER

## 2022-01-01 PROCEDURE — P9016 RBC LEUKOCYTES REDUCED: HCPCS | Mod: NTX

## 2022-01-01 PROCEDURE — 43239 EGD BIOPSY SINGLE/MULTIPLE: CPT | Mod: NTX,,, | Performed by: INTERNAL MEDICINE

## 2022-01-01 PROCEDURE — 83735 ASSAY OF MAGNESIUM: CPT | Mod: NTX | Performed by: INTERNAL MEDICINE

## 2022-01-01 PROCEDURE — 85610 PROTHROMBIN TIME: CPT | Mod: NTX | Performed by: EMERGENCY MEDICINE

## 2022-01-01 PROCEDURE — 20000000 HC ICU ROOM: Mod: NTX

## 2022-01-01 PROCEDURE — 27201089 HC SNARE, DISP (ANY): Mod: NTX | Performed by: INTERNAL MEDICINE

## 2022-01-01 PROCEDURE — 93010 ELECTROCARDIOGRAM REPORT: CPT | Mod: NTX,,, | Performed by: INTERNAL MEDICINE

## 2022-01-01 PROCEDURE — 11000001 HC ACUTE MED/SURG PRIVATE ROOM: Mod: NTX

## 2022-01-01 PROCEDURE — 99291 PR CRITICAL CARE, E/M 30-74 MINUTES: ICD-10-PCS | Mod: 25,NTX,, | Performed by: INTERNAL MEDICINE

## 2022-01-01 PROCEDURE — 27202298: Mod: NTX | Performed by: INTERNAL MEDICINE

## 2022-01-01 PROCEDURE — 25000003 PHARM REV CODE 250: Mod: TXP | Performed by: NURSE ANESTHETIST, CERTIFIED REGISTERED

## 2022-01-01 PROCEDURE — 93005 ELECTROCARDIOGRAM TRACING: CPT | Mod: NTX

## 2022-01-01 PROCEDURE — 84100 ASSAY OF PHOSPHORUS: CPT | Mod: NTX | Performed by: INTERNAL MEDICINE

## 2022-01-01 PROCEDURE — 99291 PR CRITICAL CARE, E/M 30-74 MINUTES: ICD-10-PCS | Mod: NTX,,,

## 2022-01-01 PROCEDURE — 43235 PR EGD, FLEX, DIAGNOSTIC: ICD-10-PCS | Mod: NTX,,, | Performed by: INTERNAL MEDICINE

## 2022-01-01 PROCEDURE — 99214 OFFICE O/P EST MOD 30 MIN: CPT | Mod: S$PBB,NTX,, | Performed by: NURSE PRACTITIONER

## 2022-01-01 PROCEDURE — 80053 COMPREHEN METABOLIC PANEL: CPT | Mod: NTX | Performed by: INTERNAL MEDICINE

## 2022-01-01 PROCEDURE — 80053 COMPREHEN METABOLIC PANEL: CPT | Mod: NTX | Performed by: NURSE PRACTITIONER

## 2022-01-01 PROCEDURE — 97530 THERAPEUTIC ACTIVITIES: CPT | Mod: NTX

## 2022-01-01 PROCEDURE — 86334 IMMUNOFIX E-PHORESIS SERUM: CPT | Mod: 26,NTX,, | Performed by: PATHOLOGY

## 2022-01-01 PROCEDURE — 99999 PR PBB SHADOW E&M-EST. PATIENT-LVL IV: ICD-10-PCS | Mod: PBBFAC,TXP,, | Performed by: NURSE PRACTITIONER

## 2022-01-01 PROCEDURE — 63600175 PHARM REV CODE 636 W HCPCS: Mod: NTX | Performed by: PHYSICIAN ASSISTANT

## 2022-01-01 PROCEDURE — 88305 TISSUE EXAM BY PATHOLOGIST: CPT | Mod: 26,NTX,, | Performed by: STUDENT IN AN ORGANIZED HEALTH CARE EDUCATION/TRAINING PROGRAM

## 2022-01-01 PROCEDURE — 85025 COMPLETE CBC W/AUTO DIFF WBC: CPT | Mod: NTX | Performed by: EMERGENCY MEDICINE

## 2022-01-01 PROCEDURE — 99223 1ST HOSP IP/OBS HIGH 75: CPT | Mod: NTX,,, | Performed by: PHYSICIAN ASSISTANT

## 2022-01-01 PROCEDURE — 99214 OFFICE O/P EST MOD 30 MIN: CPT | Mod: PBBFAC,25,TXP | Performed by: NURSE PRACTITIONER

## 2022-01-01 PROCEDURE — 99222 PR INITIAL HOSPITAL CARE,LEVL II: ICD-10-PCS | Mod: NTX,,, | Performed by: SURGERY

## 2022-01-01 PROCEDURE — 99214 OFFICE O/P EST MOD 30 MIN: CPT | Mod: 95,,, | Performed by: NURSE PRACTITIONER

## 2022-01-01 PROCEDURE — 63600175 PHARM REV CODE 636 W HCPCS: Mod: NTX | Performed by: EMERGENCY MEDICINE

## 2022-01-01 PROCEDURE — 99999 PR PBB SHADOW E&M-EST. PATIENT-LVL IV: CPT | Mod: PBBFAC,TXP,, | Performed by: INTERNAL MEDICINE

## 2022-01-01 PROCEDURE — P9016 RBC LEUKOCYTES REDUCED: HCPCS | Mod: NTX | Performed by: ANESTHESIOLOGY

## 2022-01-01 PROCEDURE — 83036 HEMOGLOBIN GLYCOSYLATED A1C: CPT | Mod: NTX | Performed by: INTERNAL MEDICINE

## 2022-01-01 PROCEDURE — 99231 PR SUBSEQUENT HOSPITAL CARE,LEVL I: ICD-10-PCS | Mod: NTX,,, | Performed by: SURGERY

## 2022-01-01 PROCEDURE — 99499 UNLISTED E&M SERVICE: CPT | Mod: NTX,,, | Performed by: INTERNAL MEDICINE

## 2022-01-01 PROCEDURE — 80197 ASSAY OF TACROLIMUS: CPT | Mod: NTX | Performed by: PHYSICIAN ASSISTANT

## 2022-01-01 PROCEDURE — 85027 COMPLETE CBC AUTOMATED: CPT | Mod: NTX

## 2022-01-01 PROCEDURE — 85025 COMPLETE CBC W/AUTO DIFF WBC: CPT | Mod: 91,NTX | Performed by: INTERNAL MEDICINE

## 2022-01-01 PROCEDURE — 99999 PR PBB SHADOW E&M-EST. PATIENT-LVL IV: ICD-10-PCS | Mod: PBBFAC,TXP,, | Performed by: INTERNAL MEDICINE

## 2022-01-01 PROCEDURE — 99214 OFFICE O/P EST MOD 30 MIN: CPT | Mod: PBBFAC,NTX | Performed by: INTERNAL MEDICINE

## 2022-01-01 PROCEDURE — 27000221 HC OXYGEN, UP TO 24 HOURS: Mod: NTX

## 2022-01-01 PROCEDURE — 85610 PROTHROMBIN TIME: CPT | Mod: NTX

## 2022-01-01 PROCEDURE — 88305 TISSUE EXAM BY PATHOLOGIST: ICD-10-PCS | Mod: 26,NTX,, | Performed by: STUDENT IN AN ORGANIZED HEALTH CARE EDUCATION/TRAINING PROGRAM

## 2022-01-01 PROCEDURE — 27201038 HC PROBE, BI-POLAR: Mod: NTX | Performed by: INTERNAL MEDICINE

## 2022-01-01 PROCEDURE — C9113 INJ PANTOPRAZOLE SODIUM, VIA: HCPCS | Mod: NTX | Performed by: INTERNAL MEDICINE

## 2022-01-01 PROCEDURE — 85027 COMPLETE CBC AUTOMATED: CPT | Mod: 91,NTX

## 2022-01-01 PROCEDURE — 86920 COMPATIBILITY TEST SPIN: CPT | Mod: NTX

## 2022-01-01 PROCEDURE — 71046 X-RAY EXAM CHEST 2 VIEWS: CPT | Mod: 26,TXP,, | Performed by: RADIOLOGY

## 2022-01-01 PROCEDURE — 99291 CRITICAL CARE FIRST HOUR: CPT | Mod: NTX,,,

## 2022-01-01 PROCEDURE — 86706 HEP B SURFACE ANTIBODY: CPT | Mod: 91,NTX | Performed by: EMERGENCY MEDICINE

## 2022-01-01 PROCEDURE — 27200997: Mod: NTX | Performed by: INTERNAL MEDICINE

## 2022-01-01 PROCEDURE — 99232 PR SUBSEQUENT HOSPITAL CARE,LEVL II: ICD-10-PCS | Mod: NTX,,, | Performed by: PHYSICIAN ASSISTANT

## 2022-01-01 PROCEDURE — 87340 HEPATITIS B SURFACE AG IA: CPT | Mod: NTX | Performed by: EMERGENCY MEDICINE

## 2022-01-01 PROCEDURE — 99291 CRITICAL CARE FIRST HOUR: CPT | Mod: 25,NTX,, | Performed by: INTERNAL MEDICINE

## 2022-01-01 PROCEDURE — 25000003 PHARM REV CODE 250: Mod: NTX | Performed by: FAMILY MEDICINE

## 2022-01-01 PROCEDURE — 63600175 PHARM REV CODE 636 W HCPCS: Mod: TXP | Performed by: NURSE ANESTHETIST, CERTIFIED REGISTERED

## 2022-01-01 PROCEDURE — 86704 HEP B CORE ANTIBODY TOTAL: CPT | Mod: NTX | Performed by: EMERGENCY MEDICINE

## 2022-01-01 PROCEDURE — 25500020 PHARM REV CODE 255: Mod: NTX | Performed by: RADIOLOGY

## 2022-01-01 PROCEDURE — 78452 STRESS TEST WITH MYOCARDIAL PERFUSION (CUPID ONLY): ICD-10-PCS | Mod: 26,TXP,, | Performed by: INTERNAL MEDICINE

## 2022-01-01 PROCEDURE — 99999 PR PBB SHADOW E&M-EST. PATIENT-LVL III: ICD-10-PCS | Mod: PBBFAC,TXP,, | Performed by: INTERNAL MEDICINE

## 2022-01-01 PROCEDURE — 99231 SBSQ HOSP IP/OBS SF/LOW 25: CPT | Mod: NTX,,, | Performed by: SURGERY

## 2022-01-01 PROCEDURE — 71046 X-RAY EXAM CHEST 2 VIEWS: CPT | Mod: TC,TXP

## 2022-01-01 PROCEDURE — 99214 PR OFFICE/OUTPT VISIT, EST, LEVL IV, 30-39 MIN: ICD-10-PCS | Mod: 95,,, | Performed by: NURSE PRACTITIONER

## 2022-01-01 PROCEDURE — 93018 STRESS TEST WITH MYOCARDIAL PERFUSION (CUPID ONLY): ICD-10-PCS | Mod: TXP,,, | Performed by: INTERNAL MEDICINE

## 2022-01-01 PROCEDURE — 27800903 OPTIME MED/SURG SUP & DEVICES OTHER IMPLANTS: Mod: NTX | Performed by: RADIOLOGY

## 2022-01-01 PROCEDURE — 99232 SBSQ HOSP IP/OBS MODERATE 35: CPT | Mod: NTX,,, | Performed by: PHYSICIAN ASSISTANT

## 2022-01-01 PROCEDURE — 86334 PATHOLOGIST INTERPRETATION IFE: ICD-10-PCS | Mod: 26,NTX,, | Performed by: PATHOLOGY

## 2022-01-01 PROCEDURE — 36430 TRANSFUSION BLD/BLD COMPNT: CPT | Mod: NTX

## 2022-01-01 PROCEDURE — 85025 COMPLETE CBC W/AUTO DIFF WBC: CPT | Mod: NTX | Performed by: INTERNAL MEDICINE

## 2022-01-01 PROCEDURE — 94761 N-INVAS EAR/PLS OXIMETRY MLT: CPT | Mod: NTX

## 2022-01-01 PROCEDURE — P9016 RBC LEUKOCYTES REDUCED: HCPCS | Mod: NTX | Performed by: FAMILY MEDICINE

## 2022-01-01 PROCEDURE — 37000008 HC ANESTHESIA 1ST 15 MINUTES: Mod: NTX | Performed by: INTERNAL MEDICINE

## 2022-01-01 PROCEDURE — 85025 COMPLETE CBC W/AUTO DIFF WBC: CPT | Mod: NTX | Performed by: HOSPITALIST

## 2022-01-01 PROCEDURE — 83735 ASSAY OF MAGNESIUM: CPT | Mod: NTX | Performed by: FAMILY MEDICINE

## 2022-01-01 PROCEDURE — 97161 PT EVAL LOW COMPLEX 20 MIN: CPT | Mod: NTX

## 2022-01-01 PROCEDURE — 85610 PROTHROMBIN TIME: CPT | Mod: NTX | Performed by: INTERNAL MEDICINE

## 2022-01-01 PROCEDURE — 27201028 HC NEEDLE, SCLERO: Mod: NTX | Performed by: INTERNAL MEDICINE

## 2022-01-01 PROCEDURE — C1769 GUIDE WIRE: HCPCS | Mod: NTX | Performed by: RADIOLOGY

## 2022-01-01 PROCEDURE — 86920 COMPATIBILITY TEST SPIN: CPT | Mod: NTX | Performed by: ANESTHESIOLOGY

## 2022-01-01 PROCEDURE — 99999 PR PBB SHADOW E&M-EST. PATIENT-LVL IV: CPT | Mod: PBBFAC,TXP,, | Performed by: NURSE PRACTITIONER

## 2022-01-01 PROCEDURE — 76770 US EXAM ABDO BACK WALL COMP: CPT | Mod: TC,TXP

## 2022-01-01 PROCEDURE — 36415 COLL VENOUS BLD VENIPUNCTURE: CPT | Mod: NTX

## 2022-01-01 PROCEDURE — 43235 EGD DIAGNOSTIC BRUSH WASH: CPT | Mod: NTX | Performed by: INTERNAL MEDICINE

## 2022-01-01 PROCEDURE — 99999 PR PBB SHADOW E&M-EST. PATIENT-LVL V: ICD-10-PCS | Mod: PBBFAC,TXP,, | Performed by: INTERNAL MEDICINE

## 2022-01-01 PROCEDURE — 99499 NO LOS: ICD-10-PCS | Mod: NTX,,, | Performed by: INTERNAL MEDICINE

## 2022-01-01 PROCEDURE — 71046 XR CHEST PA AND LATERAL: ICD-10-PCS | Mod: 26,TXP,, | Performed by: RADIOLOGY

## 2022-01-01 PROCEDURE — 84165 PROTEIN E-PHORESIS SERUM: CPT | Mod: 26,NTX,, | Performed by: PATHOLOGY

## 2022-01-01 PROCEDURE — 43255 EGD CONTROL BLEEDING ANY: CPT | Mod: NTX,,, | Performed by: INTERNAL MEDICINE

## 2022-01-01 PROCEDURE — 97167 OT EVAL HIGH COMPLEX 60 MIN: CPT | Mod: NTX

## 2022-01-01 PROCEDURE — 93010 EKG 12-LEAD: ICD-10-PCS | Mod: NTX,,, | Performed by: INTERNAL MEDICINE

## 2022-01-01 PROCEDURE — 27200997: Performed by: INTERNAL MEDICINE

## 2022-01-01 PROCEDURE — 85018 HEMOGLOBIN: CPT | Mod: 91,NTX | Performed by: FAMILY MEDICINE

## 2022-01-01 PROCEDURE — 80048 BASIC METABOLIC PNL TOTAL CA: CPT | Mod: NTX | Performed by: INTERNAL MEDICINE

## 2022-01-01 PROCEDURE — P9017 PLASMA 1 DONOR FRZ W/IN 8 HR: HCPCS | Mod: NTX | Performed by: INTERNAL MEDICINE

## 2022-01-01 PROCEDURE — 86334 IMMUNOFIX E-PHORESIS SERUM: CPT | Mod: TXP | Performed by: INTERNAL MEDICINE

## 2022-01-01 PROCEDURE — 99204 OFFICE O/P NEW MOD 45 MIN: CPT | Mod: 95,,, | Performed by: NURSE PRACTITIONER

## 2022-01-01 PROCEDURE — 84165 PROTEIN E-PHORESIS SERUM: CPT | Mod: TXP | Performed by: INTERNAL MEDICINE

## 2022-01-01 PROCEDURE — 99999 PR PBB SHADOW E&M-EST. PATIENT-LVL V: CPT | Mod: PBBFAC,TXP,, | Performed by: INTERNAL MEDICINE

## 2022-01-01 PROCEDURE — 63600175 PHARM REV CODE 636 W HCPCS: Mod: NTX

## 2022-01-01 PROCEDURE — 27200966 HC CLOSED SUCTION SYSTEM: Mod: NTX

## 2022-01-01 PROCEDURE — 43235 PR EGD, FLEX, DIAGNOSTIC: ICD-10-PCS | Mod: 22,52,NTX, | Performed by: INTERNAL MEDICINE

## 2022-01-01 PROCEDURE — 93016 STRESS TEST WITH MYOCARDIAL PERFUSION (CUPID ONLY): ICD-10-PCS | Mod: TXP,,, | Performed by: INTERNAL MEDICINE

## 2022-01-01 PROCEDURE — 99232 SBSQ HOSP IP/OBS MODERATE 35: CPT | Mod: NTX,,, | Performed by: INTERNAL MEDICINE

## 2022-01-01 PROCEDURE — 84100 ASSAY OF PHOSPHORUS: CPT | Mod: NTX | Performed by: NURSE PRACTITIONER

## 2022-01-01 PROCEDURE — 99291 CRITICAL CARE FIRST HOUR: CPT | Mod: NTX,,, | Performed by: NURSE PRACTITIONER

## 2022-01-01 PROCEDURE — 86850 RBC ANTIBODY SCREEN: CPT | Mod: NTX | Performed by: INTERNAL MEDICINE

## 2022-01-01 PROCEDURE — 25000003 PHARM REV CODE 250: Mod: NTX | Performed by: ANESTHESIOLOGY

## 2022-01-01 PROCEDURE — C1887 CATHETER, GUIDING: HCPCS | Mod: NTX | Performed by: RADIOLOGY

## 2022-01-01 PROCEDURE — 99213 OFFICE O/P EST LOW 20 MIN: CPT | Mod: PBBFAC,NTX | Performed by: INTERNAL MEDICINE

## 2022-01-01 PROCEDURE — 99222 1ST HOSP IP/OBS MODERATE 55: CPT | Mod: NTX,,, | Performed by: SURGERY

## 2022-01-01 PROCEDURE — 82306 VITAMIN D 25 HYDROXY: CPT | Mod: NTX | Performed by: INTERNAL MEDICINE

## 2022-01-01 PROCEDURE — 99900035 HC TECH TIME PER 15 MIN (STAT): Mod: NTX

## 2022-01-01 PROCEDURE — 86920 COMPATIBILITY TEST SPIN: CPT | Mod: NTX | Performed by: INTERNAL MEDICINE

## 2022-01-01 PROCEDURE — 25000003 PHARM REV CODE 250: Mod: NTX | Performed by: NURSE ANESTHETIST, CERTIFIED REGISTERED

## 2022-01-01 PROCEDURE — 99999 PR PBB SHADOW E&M-EST. PATIENT-LVL III: CPT | Mod: PBBFAC,TXP,, | Performed by: INTERNAL MEDICINE

## 2022-01-01 PROCEDURE — 84100 ASSAY OF PHOSPHORUS: CPT | Mod: NTX

## 2022-01-01 PROCEDURE — 25000003 PHARM REV CODE 250: Mod: NTX | Performed by: EMERGENCY MEDICINE

## 2022-01-01 PROCEDURE — 99215 PR OFFICE/OUTPT VISIT, EST, LEVL V, 40-54 MIN: ICD-10-PCS | Mod: S$PBB,TXP,, | Performed by: NURSE PRACTITIONER

## 2022-01-01 PROCEDURE — 63600175 PHARM REV CODE 636 W HCPCS: Mod: NTX | Performed by: NURSE ANESTHETIST, CERTIFIED REGISTERED

## 2022-01-01 PROCEDURE — 63600175 PHARM REV CODE 636 W HCPCS: Mod: NTX | Performed by: RADIOLOGY

## 2022-01-01 PROCEDURE — 43239 PR EGD, FLEX, W/BIOPSY, SGL/MULTI: ICD-10-PCS | Mod: NTX,,, | Performed by: INTERNAL MEDICINE

## 2022-01-01 PROCEDURE — 84165 PATHOLOGIST INTERPRETATION SPE: ICD-10-PCS | Mod: 26,NTX,, | Performed by: PATHOLOGY

## 2022-01-01 PROCEDURE — 80197 ASSAY OF TACROLIMUS: CPT | Mod: NTX | Performed by: INTERNAL MEDICINE

## 2022-01-01 PROCEDURE — 99215 OFFICE O/P EST HI 40 MIN: CPT | Mod: S$PBB,NTX,, | Performed by: INTERNAL MEDICINE

## 2022-01-01 PROCEDURE — 93306 ECHO (CUPID ONLY): ICD-10-PCS | Mod: 26,TXP,, | Performed by: INTERNAL MEDICINE

## 2022-01-01 PROCEDURE — 76770 US EXAM ABDO BACK WALL COMP: CPT | Mod: 26,TXP,, | Performed by: RADIOLOGY

## 2022-01-01 PROCEDURE — 83735 ASSAY OF MAGNESIUM: CPT | Mod: NTX

## 2022-01-01 PROCEDURE — 25000003 PHARM REV CODE 250: Mod: NTX | Performed by: RADIOLOGY

## 2022-01-01 PROCEDURE — 86481 TB AG RESPONSE T-CELL SUSP: CPT | Mod: NTX | Performed by: INTERNAL MEDICINE

## 2022-01-01 PROCEDURE — 99291 PR CRITICAL CARE, E/M 30-74 MINUTES: ICD-10-PCS | Mod: NTX,,, | Performed by: NURSE PRACTITIONER

## 2022-01-01 PROCEDURE — 76604 US EXAM CHEST: CPT | Mod: TC,TXP

## 2022-01-01 PROCEDURE — 43239 EGD BIOPSY SINGLE/MULTIPLE: CPT | Mod: TXP | Performed by: INTERNAL MEDICINE

## 2022-01-01 PROCEDURE — P9016 RBC LEUKOCYTES REDUCED: HCPCS | Mod: NTX | Performed by: INTERNAL MEDICINE

## 2022-01-01 PROCEDURE — 25000003 PHARM REV CODE 250: Mod: NTX | Performed by: STUDENT IN AN ORGANIZED HEALTH CARE EDUCATION/TRAINING PROGRAM

## 2022-01-01 PROCEDURE — 25000003 PHARM REV CODE 250: Mod: NTX | Performed by: HOSPITALIST

## 2022-01-01 PROCEDURE — C1894 INTRO/SHEATH, NON-LASER: HCPCS | Mod: NTX | Performed by: RADIOLOGY

## 2022-01-01 PROCEDURE — 85014 HEMATOCRIT: CPT | Mod: NTX | Performed by: INTERNAL MEDICINE

## 2022-01-01 PROCEDURE — 85027 COMPLETE CBC AUTOMATED: CPT | Mod: 91,NTX | Performed by: NURSE PRACTITIONER

## 2022-01-01 PROCEDURE — 93306 TTE W/DOPPLER COMPLETE: CPT | Mod: 26,TXP,, | Performed by: INTERNAL MEDICINE

## 2022-01-01 PROCEDURE — 25500020 PHARM REV CODE 255: Mod: NTX | Performed by: FAMILY MEDICINE

## 2022-01-01 PROCEDURE — 43235 EGD DIAGNOSTIC BRUSH WASH: CPT | Mod: 22,52,NTX, | Performed by: INTERNAL MEDICINE

## 2022-01-01 PROCEDURE — 99214 PR OFFICE/OUTPT VISIT, EST, LEVL IV, 30-39 MIN: ICD-10-PCS | Mod: S$PBB,NTX,, | Performed by: NURSE PRACTITIONER

## 2022-01-01 PROCEDURE — 43255 PR EGD, FLEX, W/CTRL BLEED, ANY METHOD: ICD-10-PCS | Mod: NTX,,, | Performed by: INTERNAL MEDICINE

## 2022-01-01 PROCEDURE — 37000008 HC ANESTHESIA 1ST 15 MINUTES: Mod: TXP | Performed by: INTERNAL MEDICINE

## 2022-01-01 PROCEDURE — 43255 EGD CONTROL BLEEDING ANY: CPT | Mod: NTX | Performed by: INTERNAL MEDICINE

## 2022-01-01 PROCEDURE — 97162 PT EVAL MOD COMPLEX 30 MIN: CPT | Mod: NTX

## 2022-01-01 PROCEDURE — 99152 MOD SED SAME PHYS/QHP 5/>YRS: CPT | Mod: NTX | Performed by: RADIOLOGY

## 2022-01-01 PROCEDURE — 99204 PR OFFICE/OUTPT VISIT, NEW, LEVL IV, 45-59 MIN: ICD-10-PCS | Mod: 95,,, | Performed by: NURSE PRACTITIONER

## 2022-01-01 PROCEDURE — 86850 RBC ANTIBODY SCREEN: CPT | Mod: NTX | Performed by: EMERGENCY MEDICINE

## 2022-01-01 PROCEDURE — 27201012 HC FORCEPS, HOT/COLD, DISP: Mod: TXP | Performed by: INTERNAL MEDICINE

## 2022-01-01 PROCEDURE — 63600175 PHARM REV CODE 636 W HCPCS: Mod: TXP | Performed by: NURSE PRACTITIONER

## 2022-01-01 PROCEDURE — C1730 CATH, EP, 19 OR FEW ELECT: HCPCS | Mod: NTX | Performed by: RADIOLOGY

## 2022-01-01 PROCEDURE — 93016 CV STRESS TEST SUPVJ ONLY: CPT | Mod: TXP,,, | Performed by: INTERNAL MEDICINE

## 2022-01-01 PROCEDURE — 99215 OFFICE O/P EST HI 40 MIN: CPT | Mod: S$PBB,TXP,, | Performed by: NURSE PRACTITIONER

## 2022-01-01 PROCEDURE — 94002 VENT MGMT INPAT INIT DAY: CPT | Mod: NTX

## 2022-01-01 PROCEDURE — 63600175 PHARM REV CODE 636 W HCPCS: Mod: JG,NTX | Performed by: INTERNAL MEDICINE

## 2022-01-01 PROCEDURE — 76604 US CHEST MEDIASTINUM: ICD-10-PCS | Mod: 26,TXP,, | Performed by: RADIOLOGY

## 2022-01-01 PROCEDURE — 97166 OT EVAL MOD COMPLEX 45 MIN: CPT | Mod: NTX

## 2022-01-01 PROCEDURE — 99153 MOD SED SAME PHYS/QHP EA: CPT | Mod: NTX | Performed by: RADIOLOGY

## 2022-01-01 PROCEDURE — 88305 TISSUE EXAM BY PATHOLOGIST: CPT | Mod: NTX | Performed by: STUDENT IN AN ORGANIZED HEALTH CARE EDUCATION/TRAINING PROGRAM

## 2022-01-01 PROCEDURE — 78452 HT MUSCLE IMAGE SPECT MULT: CPT | Mod: 26,TXP,, | Performed by: INTERNAL MEDICINE

## 2022-01-01 PROCEDURE — 86706 HEP B SURFACE ANTIBODY: CPT | Mod: NTX | Performed by: EMERGENCY MEDICINE

## 2022-01-01 PROCEDURE — 76604 US EXAM CHEST: CPT | Mod: 26,TXP,, | Performed by: RADIOLOGY

## 2022-01-01 PROCEDURE — 86038 ANTINUCLEAR ANTIBODIES: CPT | Mod: NTX | Performed by: INTERNAL MEDICINE

## 2022-01-01 PROCEDURE — 27202075 HC SUCTION DEVICE, BIOVAC: Mod: NTX | Performed by: INTERNAL MEDICINE

## 2022-01-01 PROCEDURE — 80053 COMPREHEN METABOLIC PANEL: CPT | Mod: NTX | Performed by: EMERGENCY MEDICINE

## 2022-01-01 PROCEDURE — 99215 PR OFFICE/OUTPT VISIT, EST, LEVL V, 40-54 MIN: ICD-10-PCS | Mod: S$PBB,NTX,, | Performed by: INTERNAL MEDICINE

## 2022-01-01 PROCEDURE — 99291 PR CRITICAL CARE, E/M 30-74 MINUTES: ICD-10-PCS | Mod: NTX,,, | Performed by: INTERNAL MEDICINE

## 2022-01-01 PROCEDURE — 93018 CV STRESS TEST I&R ONLY: CPT | Mod: TXP,,, | Performed by: INTERNAL MEDICINE

## 2022-01-01 PROCEDURE — A9502 TC99M TETROFOSMIN: HCPCS | Mod: TXP

## 2022-01-01 PROCEDURE — 83970 ASSAY OF PARATHORMONE: CPT | Mod: NTX | Performed by: INTERNAL MEDICINE

## 2022-01-01 PROCEDURE — 86431 RHEUMATOID FACTOR QUANT: CPT | Mod: TXP | Performed by: INTERNAL MEDICINE

## 2022-01-01 PROCEDURE — 27201042 HC RETRIEVAL NET: Mod: NTX | Performed by: INTERNAL MEDICINE

## 2022-01-01 PROCEDURE — 86901 BLOOD TYPING SEROLOGIC RH(D): CPT | Mod: NTX | Performed by: INTERNAL MEDICINE

## 2022-01-01 PROCEDURE — 99215 OFFICE O/P EST HI 40 MIN: CPT | Mod: PBBFAC,TXP | Performed by: INTERNAL MEDICINE

## 2022-01-01 PROCEDURE — 99291 CRITICAL CARE FIRST HOUR: CPT | Mod: NTX

## 2022-01-01 PROCEDURE — 85018 HEMOGLOBIN: CPT | Mod: NTX | Performed by: INTERNAL MEDICINE

## 2022-01-01 PROCEDURE — 83520 IMMUNOASSAY QUANT NOS NONAB: CPT | Mod: 59,TXP | Performed by: NURSE PRACTITIONER

## 2022-01-01 PROCEDURE — 80048 BASIC METABOLIC PNL TOTAL CA: CPT | Mod: NTX | Performed by: FAMILY MEDICINE

## 2022-01-01 PROCEDURE — 97116 GAIT TRAINING THERAPY: CPT | Mod: NTX

## 2022-01-01 RX ORDER — POLYETHYLENE GLYCOL 3350 17 G/17G
17 POWDER, FOR SOLUTION ORAL DAILY
Status: DISCONTINUED | OUTPATIENT
Start: 2022-01-01 | End: 2022-01-01

## 2022-01-01 RX ORDER — TALC
6 POWDER (GRAM) TOPICAL NIGHTLY PRN
Status: DISCONTINUED | OUTPATIENT
Start: 2022-01-01 | End: 2022-01-01 | Stop reason: HOSPADM

## 2022-01-01 RX ORDER — EPINEPHRINE 0.1 MG/ML
INJECTION INTRAVENOUS
Status: COMPLETED | OUTPATIENT
Start: 2022-01-01 | End: 2022-01-01

## 2022-01-01 RX ORDER — HYDROCODONE BITARTRATE AND ACETAMINOPHEN 500; 5 MG/1; MG/1
TABLET ORAL
Status: DISCONTINUED | OUTPATIENT
Start: 2022-01-01 | End: 2022-01-01

## 2022-01-01 RX ORDER — ACETAMINOPHEN 325 MG/1
650 TABLET ORAL EVERY 4 HOURS PRN
Status: DISCONTINUED | OUTPATIENT
Start: 2022-01-01 | End: 2022-01-01

## 2022-01-01 RX ORDER — DEXTROSE MONOHYDRATE 100 MG/ML
INJECTION, SOLUTION INTRAVENOUS CONTINUOUS PRN
Status: DISCONTINUED | OUTPATIENT
Start: 2022-01-01 | End: 2022-01-01

## 2022-01-01 RX ORDER — MIDODRINE HYDROCHLORIDE 2.5 MG/1
2.5 TABLET ORAL 2 TIMES DAILY WITH MEALS
Status: DISCONTINUED | OUTPATIENT
Start: 2022-01-01 | End: 2022-01-01 | Stop reason: HOSPADM

## 2022-01-01 RX ORDER — SEVELAMER CARBONATE 800 MG/1
1600 TABLET, FILM COATED ORAL
Status: DISCONTINUED | OUTPATIENT
Start: 2022-01-01 | End: 2022-01-01

## 2022-01-01 RX ORDER — GLUCAGON 1 MG
1 KIT INJECTION
Status: DISCONTINUED | OUTPATIENT
Start: 2022-01-01 | End: 2022-01-01

## 2022-01-01 RX ORDER — PANTOPRAZOLE SODIUM 40 MG/1
40 TABLET, DELAYED RELEASE ORAL 2 TIMES DAILY
Status: DISCONTINUED | OUTPATIENT
Start: 2022-01-01 | End: 2022-01-01 | Stop reason: HOSPADM

## 2022-01-01 RX ORDER — REGADENOSON 0.08 MG/ML
0.4 INJECTION, SOLUTION INTRAVENOUS
Status: COMPLETED | OUTPATIENT
Start: 2022-01-01 | End: 2022-01-01

## 2022-01-01 RX ORDER — PROPOFOL 10 MG/ML
VIAL (ML) INTRAVENOUS
Status: DISCONTINUED | OUTPATIENT
Start: 2022-01-01 | End: 2022-01-01

## 2022-01-01 RX ORDER — METOCLOPRAMIDE HYDROCHLORIDE 5 MG/ML
10 INJECTION INTRAMUSCULAR; INTRAVENOUS EVERY 6 HOURS
Status: DISCONTINUED | OUTPATIENT
Start: 2022-01-01 | End: 2022-01-01

## 2022-01-01 RX ORDER — PROMETHAZINE HYDROCHLORIDE 25 MG/1
25 TABLET ORAL 3 TIMES DAILY PRN
COMMUNITY
Start: 2022-01-01

## 2022-01-01 RX ORDER — FENTANYL CITRATE-0.9 % NACL/PF 10 MCG/ML
0-100 PLASTIC BAG, INJECTION (ML) INTRAVENOUS CONTINUOUS
Status: DISCONTINUED | OUTPATIENT
Start: 2022-01-01 | End: 2022-01-01

## 2022-01-01 RX ORDER — OXYCODONE HYDROCHLORIDE 5 MG/1
5 TABLET ORAL EVERY 12 HOURS PRN
Status: ON HOLD | COMMUNITY
End: 2022-01-01 | Stop reason: HOSPADM

## 2022-01-01 RX ORDER — ONDANSETRON 8 MG/1
8 TABLET, ORALLY DISINTEGRATING ORAL EVERY 8 HOURS PRN
Status: DISCONTINUED | OUTPATIENT
Start: 2022-01-01 | End: 2022-01-01 | Stop reason: HOSPADM

## 2022-01-01 RX ORDER — PANTOPRAZOLE SODIUM 40 MG/10ML
40 INJECTION, POWDER, LYOPHILIZED, FOR SOLUTION INTRAVENOUS
Status: CANCELLED | OUTPATIENT
Start: 2022-01-01 | End: 2022-01-01

## 2022-01-01 RX ORDER — TACROLIMUS 1 MG/1
1 CAPSULE ORAL EVERY EVENING
Status: DISCONTINUED | OUTPATIENT
Start: 2022-01-01 | End: 2022-01-01 | Stop reason: HOSPADM

## 2022-01-01 RX ORDER — NOREPINEPHRINE BITARTRATE/D5W 4MG/250ML
0-3 PLASTIC BAG, INJECTION (ML) INTRAVENOUS CONTINUOUS
Status: DISCONTINUED | OUTPATIENT
Start: 2022-01-01 | End: 2022-01-01

## 2022-01-01 RX ORDER — TACROLIMUS 1 MG/1
CAPSULE ORAL
Qty: 90 CAPSULE | Refills: 11 | Status: SHIPPED | OUTPATIENT
Start: 2022-01-01 | End: 2023-01-01 | Stop reason: SDUPTHER

## 2022-01-01 RX ORDER — POTASSIUM CHLORIDE 20 MEQ/1
40 TABLET, EXTENDED RELEASE ORAL ONCE
Status: COMPLETED | OUTPATIENT
Start: 2022-01-01 | End: 2022-01-01

## 2022-01-01 RX ORDER — PANTOPRAZOLE SODIUM 40 MG/10ML
40 INJECTION, POWDER, LYOPHILIZED, FOR SOLUTION INTRAVENOUS 2 TIMES DAILY
Status: DISCONTINUED | OUTPATIENT
Start: 2022-01-01 | End: 2022-01-01

## 2022-01-01 RX ORDER — PANTOPRAZOLE SODIUM 40 MG/10ML
40 INJECTION, POWDER, LYOPHILIZED, FOR SOLUTION INTRAVENOUS
Status: DISCONTINUED | OUTPATIENT
Start: 2022-01-01 | End: 2022-01-01 | Stop reason: HOSPADM

## 2022-01-01 RX ORDER — LEVOTHYROXINE SODIUM 50 UG/1
50 TABLET ORAL DAILY
COMMUNITY
Start: 2022-01-01 | End: 2023-01-01

## 2022-01-01 RX ORDER — LIDOCAINE HYDROCHLORIDE 20 MG/ML
INJECTION INTRAVENOUS
Status: DISCONTINUED | OUTPATIENT
Start: 2022-01-01 | End: 2022-01-01

## 2022-01-01 RX ORDER — EPINEPHRINE 1 MG/ML
INJECTION, SOLUTION INTRACARDIAC; INTRAMUSCULAR; INTRAVENOUS; SUBCUTANEOUS
Status: DISCONTINUED | OUTPATIENT
Start: 2022-01-01 | End: 2022-01-01

## 2022-01-01 RX ORDER — MAGNESIUM SULFATE HEPTAHYDRATE 40 MG/ML
2 INJECTION, SOLUTION INTRAVENOUS
Status: COMPLETED | OUTPATIENT
Start: 2022-01-01 | End: 2022-01-01

## 2022-01-01 RX ORDER — ONDANSETRON 4 MG/1
TABLET, FILM COATED ORAL
COMMUNITY
Start: 2022-04-07

## 2022-01-01 RX ORDER — SUCRALFATE 1 G/10ML
1 SUSPENSION ORAL EVERY 6 HOURS
Status: DISCONTINUED | OUTPATIENT
Start: 2022-01-01 | End: 2022-01-01 | Stop reason: HOSPADM

## 2022-01-01 RX ORDER — ETOMIDATE 2 MG/ML
INJECTION INTRAVENOUS
Status: DISCONTINUED | OUTPATIENT
Start: 2022-01-01 | End: 2022-01-01

## 2022-01-01 RX ORDER — PROMETHAZINE HYDROCHLORIDE 25 MG/1
25 TABLET ORAL EVERY 6 HOURS PRN
Status: DISCONTINUED | OUTPATIENT
Start: 2022-01-01 | End: 2022-01-01

## 2022-01-01 RX ORDER — MIDAZOLAM HYDROCHLORIDE 1 MG/ML
INJECTION INTRAMUSCULAR; INTRAVENOUS
Status: DISCONTINUED | OUTPATIENT
Start: 2022-01-01 | End: 2022-01-01

## 2022-01-01 RX ORDER — FENTANYL CITRATE-0.9 % NACL/PF 10 MCG/ML
0-250 PLASTIC BAG, INJECTION (ML) INTRAVENOUS CONTINUOUS
Status: DISCONTINUED | OUTPATIENT
Start: 2022-01-01 | End: 2022-01-01

## 2022-01-01 RX ORDER — FUROSEMIDE 40 MG/1
80 TABLET ORAL DAILY
Status: DISCONTINUED | OUTPATIENT
Start: 2022-01-01 | End: 2022-01-01 | Stop reason: HOSPADM

## 2022-01-01 RX ORDER — ONDANSETRON 4 MG/1
4 TABLET, FILM COATED ORAL EVERY 8 HOURS PRN
Status: DISCONTINUED | OUTPATIENT
Start: 2022-01-01 | End: 2022-01-01 | Stop reason: HOSPADM

## 2022-01-01 RX ORDER — SODIUM CHLORIDE 9 MG/ML
INJECTION, SOLUTION INTRAVENOUS CONTINUOUS
Status: DISCONTINUED | OUTPATIENT
Start: 2022-01-01 | End: 2022-01-01

## 2022-01-01 RX ORDER — MAG HYDROX/ALUMINUM HYD/SIMETH 200-200-20
30 SUSPENSION, ORAL (FINAL DOSE FORM) ORAL 4 TIMES DAILY PRN
Status: DISCONTINUED | OUTPATIENT
Start: 2022-01-01 | End: 2022-01-01 | Stop reason: HOSPADM

## 2022-01-01 RX ORDER — MIDAZOLAM HYDROCHLORIDE 1 MG/ML
INJECTION, SOLUTION INTRAMUSCULAR; INTRAVENOUS
Status: DISCONTINUED | OUTPATIENT
Start: 2022-01-01 | End: 2022-01-01 | Stop reason: HOSPADM

## 2022-01-01 RX ORDER — AMOXICILLIN 250 MG
2 CAPSULE ORAL DAILY
Status: DISCONTINUED | OUTPATIENT
Start: 2022-01-01 | End: 2022-01-01

## 2022-01-01 RX ORDER — CHLORHEXIDINE GLUCONATE ORAL RINSE 1.2 MG/ML
15 SOLUTION DENTAL 2 TIMES DAILY
Status: DISCONTINUED | OUTPATIENT
Start: 2022-01-01 | End: 2022-01-01

## 2022-01-01 RX ORDER — POTASSIUM CHLORIDE 20 MEQ/1
20 TABLET, EXTENDED RELEASE ORAL ONCE
Status: COMPLETED | OUTPATIENT
Start: 2022-01-01 | End: 2022-01-01

## 2022-01-01 RX ORDER — PANTOPRAZOLE SODIUM 40 MG/1
40 TABLET, DELAYED RELEASE ORAL 2 TIMES DAILY
Qty: 60 TABLET | Refills: 3 | OUTPATIENT
Start: 2022-01-01 | End: 2023-08-26

## 2022-01-01 RX ORDER — KETAMINE HYDROCHLORIDE 50 MG/ML
INJECTION, SOLUTION INTRAMUSCULAR; INTRAVENOUS
Status: DISCONTINUED | OUTPATIENT
Start: 2022-01-01 | End: 2022-01-01

## 2022-01-01 RX ORDER — GENTAMICIN SULFATE 1 MG/G
CREAM TOPICAL 2 TIMES DAILY
COMMUNITY
Start: 2022-01-01

## 2022-01-01 RX ORDER — MUPIROCIN 20 MG/G
OINTMENT TOPICAL 2 TIMES DAILY
Status: DISCONTINUED | OUTPATIENT
Start: 2022-01-01 | End: 2022-01-01 | Stop reason: HOSPADM

## 2022-01-01 RX ORDER — TACROLIMUS 1 MG/1
2 CAPSULE ORAL EVERY MORNING
Status: DISCONTINUED | OUTPATIENT
Start: 2022-01-01 | End: 2022-01-01 | Stop reason: HOSPADM

## 2022-01-01 RX ORDER — ONDANSETRON 2 MG/ML
INJECTION INTRAMUSCULAR; INTRAVENOUS
Status: COMPLETED
Start: 2022-01-01 | End: 2022-01-01

## 2022-01-01 RX ORDER — LIDOCAINE HYDROCHLORIDE 20 MG/ML
INJECTION, SOLUTION EPIDURAL; INFILTRATION; INTRACAUDAL; PERINEURAL
Status: DISCONTINUED | OUTPATIENT
Start: 2022-01-01 | End: 2022-01-01

## 2022-01-01 RX ORDER — SODIUM BICARBONATE 650 MG/1
650 TABLET ORAL 2 TIMES DAILY
Status: COMPLETED | OUTPATIENT
Start: 2022-01-01 | End: 2022-01-01

## 2022-01-01 RX ORDER — SODIUM CHLORIDE, SODIUM LACTATE, POTASSIUM CHLORIDE, CALCIUM CHLORIDE 600; 310; 30; 20 MG/100ML; MG/100ML; MG/100ML; MG/100ML
INJECTION, SOLUTION INTRAVENOUS CONTINUOUS
Status: DISCONTINUED | OUTPATIENT
Start: 2022-01-01 | End: 2022-01-01 | Stop reason: CLARIF

## 2022-01-01 RX ORDER — SODIUM CHLORIDE 9 MG/ML
INJECTION, SOLUTION INTRAVENOUS CONTINUOUS
Status: DISCONTINUED | OUTPATIENT
Start: 2022-01-01 | End: 2022-01-01 | Stop reason: HOSPADM

## 2022-01-01 RX ORDER — SIMETHICONE 80 MG
1 TABLET,CHEWABLE ORAL 4 TIMES DAILY PRN
Status: DISCONTINUED | OUTPATIENT
Start: 2022-01-01 | End: 2022-01-01 | Stop reason: HOSPADM

## 2022-01-01 RX ORDER — CYCLOBENZAPRINE HCL 5 MG
5 TABLET ORAL 3 TIMES DAILY PRN
Status: DISCONTINUED | OUTPATIENT
Start: 2022-01-01 | End: 2022-01-01 | Stop reason: HOSPADM

## 2022-01-01 RX ORDER — ERGOCALCIFEROL 1.25 MG/1
50000 CAPSULE ORAL
COMMUNITY

## 2022-01-01 RX ORDER — NALOXONE HCL 0.4 MG/ML
0.02 VIAL (ML) INJECTION
Status: DISCONTINUED | OUTPATIENT
Start: 2022-01-01 | End: 2022-01-01 | Stop reason: HOSPADM

## 2022-01-01 RX ORDER — MUPIROCIN 20 MG/G
OINTMENT TOPICAL 2 TIMES DAILY
Status: DISPENSED | OUTPATIENT
Start: 2022-01-01 | End: 2022-01-01

## 2022-01-01 RX ORDER — METOCLOPRAMIDE HYDROCHLORIDE 5 MG/ML
10 INJECTION INTRAMUSCULAR; INTRAVENOUS ONCE
Status: DISCONTINUED | OUTPATIENT
Start: 2022-01-01 | End: 2022-01-01 | Stop reason: ALTCHOICE

## 2022-01-01 RX ORDER — SEVELAMER CARBONATE 800 MG/1
1600 TABLET, FILM COATED ORAL
Status: DISCONTINUED | OUTPATIENT
Start: 2022-01-01 | End: 2022-01-01 | Stop reason: HOSPADM

## 2022-01-01 RX ORDER — DEXTROMETHORPHAN/PSEUDOEPHED 2.5-7.5/.8
DROPS ORAL
Status: COMPLETED | OUTPATIENT
Start: 2022-01-01 | End: 2022-01-01

## 2022-01-01 RX ORDER — LEVOTHYROXINE SODIUM 50 UG/1
50 TABLET ORAL
Status: DISCONTINUED | OUTPATIENT
Start: 2022-01-01 | End: 2022-01-01 | Stop reason: HOSPADM

## 2022-01-01 RX ORDER — IBUPROFEN 200 MG
16 TABLET ORAL
Status: DISCONTINUED | OUTPATIENT
Start: 2022-01-01 | End: 2022-01-01 | Stop reason: HOSPADM

## 2022-01-01 RX ORDER — LIDOCAINE HYDROCHLORIDE 10 MG/ML
INJECTION, SOLUTION EPIDURAL; INFILTRATION; INTRACAUDAL; PERINEURAL
Status: DISCONTINUED | OUTPATIENT
Start: 2022-01-01 | End: 2022-01-01

## 2022-01-01 RX ORDER — PROPOFOL 10 MG/ML
INJECTION, EMULSION INTRAVENOUS
Status: COMPLETED
Start: 2022-01-01 | End: 2022-01-01

## 2022-01-01 RX ORDER — SODIUM CHLORIDE 0.9 % (FLUSH) 0.9 %
10 SYRINGE (ML) INJECTION
Status: DISCONTINUED | OUTPATIENT
Start: 2022-01-01 | End: 2022-01-01 | Stop reason: HOSPADM

## 2022-01-01 RX ORDER — DIPHENHYDRAMINE HYDROCHLORIDE 50 MG/ML
25 INJECTION INTRAMUSCULAR; INTRAVENOUS ONCE
Status: COMPLETED | OUTPATIENT
Start: 2022-01-01 | End: 2022-01-01

## 2022-01-01 RX ORDER — LANOLIN ALCOHOL/MO/W.PET/CERES
800 CREAM (GRAM) TOPICAL ONCE
Status: COMPLETED | OUTPATIENT
Start: 2022-01-01 | End: 2022-01-01

## 2022-01-01 RX ORDER — FUROSEMIDE 80 MG/1
80 TABLET ORAL 2 TIMES DAILY
Status: DISCONTINUED | OUTPATIENT
Start: 2022-01-01 | End: 2022-01-01 | Stop reason: HOSPADM

## 2022-01-01 RX ORDER — LEVOTHYROXINE SODIUM 50 UG/1
50 TABLET ORAL DAILY
Status: DISCONTINUED | OUTPATIENT
Start: 2022-01-01 | End: 2022-01-01 | Stop reason: HOSPADM

## 2022-01-01 RX ORDER — FERROUS SULFATE 324(65)MG
324 TABLET, DELAYED RELEASE (ENTERIC COATED) ORAL DAILY
Qty: 60 TABLET | Refills: 0 | Status: SHIPPED | OUTPATIENT
Start: 2022-01-01 | End: 2023-01-01

## 2022-01-01 RX ORDER — HYDROCODONE BITARTRATE AND ACETAMINOPHEN 500; 5 MG/1; MG/1
TABLET ORAL
Status: DISCONTINUED | OUTPATIENT
Start: 2022-01-01 | End: 2022-01-01 | Stop reason: HOSPADM

## 2022-01-01 RX ORDER — IBUPROFEN 200 MG
24 TABLET ORAL
Status: DISCONTINUED | OUTPATIENT
Start: 2022-01-01 | End: 2022-01-01 | Stop reason: HOSPADM

## 2022-01-01 RX ORDER — DIPHENHYDRAMINE HYDROCHLORIDE 50 MG/ML
INJECTION INTRAMUSCULAR; INTRAVENOUS
Status: DISPENSED
Start: 2022-01-01 | End: 2022-01-01

## 2022-01-01 RX ORDER — CYPROHEPTADINE HYDROCHLORIDE 4 MG/1
4 TABLET ORAL 2 TIMES DAILY
COMMUNITY
Start: 2022-01-01 | End: 2022-01-01

## 2022-01-01 RX ORDER — TORSEMIDE 100 MG/1
100 TABLET ORAL DAILY
COMMUNITY

## 2022-01-01 RX ORDER — VECURONIUM BROMIDE FOR INJECTION 1 MG/ML
INJECTION, POWDER, LYOPHILIZED, FOR SOLUTION INTRAVENOUS
Status: DISCONTINUED | OUTPATIENT
Start: 2022-01-01 | End: 2022-01-01

## 2022-01-01 RX ORDER — CYANOCOBALAMIN 1000 UG/ML
1000 INJECTION, SOLUTION INTRAMUSCULAR; SUBCUTANEOUS ONCE
Status: COMPLETED | OUTPATIENT
Start: 2022-01-01 | End: 2022-01-01

## 2022-01-01 RX ORDER — ONDANSETRON 4 MG/1
4 TABLET, ORALLY DISINTEGRATING ORAL EVERY 6 HOURS PRN
Status: DISCONTINUED | OUTPATIENT
Start: 2022-01-01 | End: 2022-01-01

## 2022-01-01 RX ORDER — ATORVASTATIN CALCIUM 40 MG/1
40 TABLET, FILM COATED ORAL NIGHTLY
COMMUNITY
Start: 2022-04-27 | End: 2023-01-01

## 2022-01-01 RX ORDER — PANTOPRAZOLE SODIUM 40 MG/1
40 TABLET, DELAYED RELEASE ORAL 2 TIMES DAILY
Qty: 60 TABLET | Refills: 3 | Status: SHIPPED | OUTPATIENT
Start: 2022-01-01 | End: 2023-08-26

## 2022-01-01 RX ORDER — CYPROHEPTADINE HYDROCHLORIDE 4 MG/1
4 TABLET ORAL 3 TIMES DAILY PRN
COMMUNITY

## 2022-01-01 RX ORDER — SUCRALFATE 1 G/1
1 TABLET ORAL
Qty: 56 TABLET | Refills: 0 | Status: SHIPPED | OUTPATIENT
Start: 2022-01-01 | End: 2022-01-01

## 2022-01-01 RX ORDER — SODIUM CHLORIDE 0.9 % (FLUSH) 0.9 %
10 SYRINGE (ML) INJECTION EVERY 8 HOURS PRN
Status: DISCONTINUED | OUTPATIENT
Start: 2022-01-01 | End: 2022-01-01 | Stop reason: HOSPADM

## 2022-01-01 RX ORDER — INSULIN ASPART 100 [IU]/ML
0-5 INJECTION, SOLUTION INTRAVENOUS; SUBCUTANEOUS EVERY 6 HOURS PRN
Status: DISCONTINUED | OUTPATIENT
Start: 2022-01-01 | End: 2022-01-01

## 2022-01-01 RX ORDER — NOREPINEPHRINE BITARTRATE/D5W 4MG/250ML
0-1 PLASTIC BAG, INJECTION (ML) INTRAVENOUS CONTINUOUS
Status: DISCONTINUED | OUTPATIENT
Start: 2022-01-01 | End: 2022-01-01

## 2022-01-01 RX ORDER — ONDANSETRON 2 MG/ML
4 INJECTION INTRAMUSCULAR; INTRAVENOUS EVERY 6 HOURS PRN
Status: DISCONTINUED | OUTPATIENT
Start: 2022-01-01 | End: 2022-01-01 | Stop reason: HOSPADM

## 2022-01-01 RX ORDER — SODIUM CHLORIDE, SODIUM LACTATE, POTASSIUM CHLORIDE, CALCIUM CHLORIDE 600; 310; 30; 20 MG/100ML; MG/100ML; MG/100ML; MG/100ML
INJECTION, SOLUTION INTRAVENOUS CONTINUOUS PRN
Status: DISCONTINUED | OUTPATIENT
Start: 2022-01-01 | End: 2022-01-01

## 2022-01-01 RX ORDER — INSULIN ASPART 100 [IU]/ML
1-10 INJECTION, SOLUTION INTRAVENOUS; SUBCUTANEOUS EVERY 4 HOURS PRN
Status: DISCONTINUED | OUTPATIENT
Start: 2022-01-01 | End: 2022-01-01

## 2022-01-01 RX ORDER — ATORVASTATIN CALCIUM 40 MG/1
40 TABLET, FILM COATED ORAL NIGHTLY
Status: DISCONTINUED | OUTPATIENT
Start: 2022-01-01 | End: 2022-01-01 | Stop reason: HOSPADM

## 2022-01-01 RX ORDER — POTASSIUM CHLORIDE 750 MG/1
10 TABLET, EXTENDED RELEASE ORAL DAILY
Status: DISCONTINUED | OUTPATIENT
Start: 2022-01-01 | End: 2022-01-01

## 2022-01-01 RX ORDER — MAGNESIUM SULFATE HEPTAHYDRATE 40 MG/ML
2 INJECTION, SOLUTION INTRAVENOUS ONCE
Status: DISCONTINUED | OUTPATIENT
Start: 2022-01-01 | End: 2022-01-01

## 2022-01-01 RX ORDER — METOCLOPRAMIDE HYDROCHLORIDE 5 MG/ML
10 INJECTION INTRAMUSCULAR; INTRAVENOUS ONCE
Status: COMPLETED | OUTPATIENT
Start: 2022-01-01 | End: 2022-01-01

## 2022-01-01 RX ORDER — POTASSIUM CHLORIDE 750 MG/1
30 TABLET, EXTENDED RELEASE ORAL ONCE
Status: COMPLETED | OUTPATIENT
Start: 2022-01-01 | End: 2022-01-01

## 2022-01-01 RX ORDER — SUCCINYLCHOLINE CHLORIDE 20 MG/ML
INJECTION INTRAMUSCULAR; INTRAVENOUS
Status: DISCONTINUED | OUTPATIENT
Start: 2022-01-01 | End: 2022-01-01

## 2022-01-01 RX ORDER — MIDODRINE HYDROCHLORIDE 2.5 MG/1
2.5 TABLET ORAL 2 TIMES DAILY WITH MEALS
Qty: 60 TABLET | Refills: 2 | Status: SHIPPED | OUTPATIENT
Start: 2022-01-01 | End: 2023-01-01

## 2022-01-01 RX ORDER — LIDOCAINE HYDROCHLORIDE 20 MG/ML
INJECTION, SOLUTION EPIDURAL; INFILTRATION; INTRACAUDAL; PERINEURAL
Status: DISCONTINUED | OUTPATIENT
Start: 2022-01-01 | End: 2022-01-01 | Stop reason: HOSPADM

## 2022-01-01 RX ORDER — GLUCAGON 1 MG
1 KIT INJECTION
Status: DISCONTINUED | OUTPATIENT
Start: 2022-01-01 | End: 2022-01-01 | Stop reason: HOSPADM

## 2022-01-01 RX ORDER — IODIXANOL 320 MG/ML
INJECTION, SOLUTION INTRAVASCULAR
Status: DISCONTINUED | OUTPATIENT
Start: 2022-01-01 | End: 2022-01-01 | Stop reason: HOSPADM

## 2022-01-01 RX ORDER — INSULIN ASPART 100 [IU]/ML
1-10 INJECTION, SOLUTION INTRAVENOUS; SUBCUTANEOUS EVERY 6 HOURS PRN
Status: DISCONTINUED | OUTPATIENT
Start: 2022-01-01 | End: 2022-01-01 | Stop reason: HOSPADM

## 2022-01-01 RX ORDER — METOCLOPRAMIDE HYDROCHLORIDE 5 MG/ML
5 INJECTION INTRAMUSCULAR; INTRAVENOUS EVERY 6 HOURS
Status: DISCONTINUED | OUTPATIENT
Start: 2022-01-01 | End: 2022-01-01

## 2022-01-01 RX ADMIN — SUCRALFATE 1 G: 1 SUSPENSION ORAL at 06:08

## 2022-01-01 RX ADMIN — METOCLOPRAMIDE 5 MG: 5 INJECTION, SOLUTION INTRAMUSCULAR; INTRAVENOUS at 05:08

## 2022-01-01 RX ADMIN — PROPOFOL 100 MG: 10 INJECTION, EMULSION INTRAVENOUS at 04:08

## 2022-01-01 RX ADMIN — EPINEPHRINE 20 MCG: 1 INJECTION INTRAMUSCULAR; INTRAVENOUS; SUBCUTANEOUS at 07:08

## 2022-01-01 RX ADMIN — EPINEPHRINE 30 MCG: 1 INJECTION INTRAMUSCULAR; INTRAVENOUS; SUBCUTANEOUS at 07:08

## 2022-01-01 RX ADMIN — PANTOPRAZOLE SODIUM 8 MG/HR: 40 INJECTION, POWDER, FOR SOLUTION INTRAVENOUS at 11:08

## 2022-01-01 RX ADMIN — SODIUM CHLORIDE: 9 INJECTION, SOLUTION INTRAVENOUS at 09:08

## 2022-01-01 RX ADMIN — PANTOPRAZOLE SODIUM 8 MG/HR: 40 INJECTION, POWDER, FOR SOLUTION INTRAVENOUS at 10:08

## 2022-01-01 RX ADMIN — MUPIROCIN: 20 OINTMENT TOPICAL at 08:08

## 2022-01-01 RX ADMIN — DIPHENHYDRAMINE HYDROCHLORIDE 25 MG: 50 INJECTION INTRAMUSCULAR; INTRAVENOUS at 06:08

## 2022-01-01 RX ADMIN — PANTOPRAZOLE SODIUM 8 MG/HR: 40 INJECTION, POWDER, FOR SOLUTION INTRAVENOUS at 03:08

## 2022-01-01 RX ADMIN — SODIUM CHLORIDE, POTASSIUM CHLORIDE, SODIUM LACTATE AND CALCIUM CHLORIDE 500 ML: 600; 310; 30; 20 INJECTION, SOLUTION INTRAVENOUS at 09:08

## 2022-01-01 RX ADMIN — ONDANSETRON 4 MG: 2 INJECTION INTRAMUSCULAR; INTRAVENOUS at 06:08

## 2022-01-01 RX ADMIN — PANTOPRAZOLE SODIUM 8 MG/HR: 40 INJECTION, POWDER, FOR SOLUTION INTRAVENOUS at 01:09

## 2022-01-01 RX ADMIN — PROPOFOL 20 MG: 10 INJECTION, EMULSION INTRAVENOUS at 08:11

## 2022-01-01 RX ADMIN — PROPOFOL 100 MG: 10 INJECTION, EMULSION INTRAVENOUS at 03:08

## 2022-01-01 RX ADMIN — PANTOPRAZOLE SODIUM 40 MG: 40 TABLET, DELAYED RELEASE ORAL at 09:09

## 2022-01-01 RX ADMIN — PANTOPRAZOLE SODIUM 40 MG: 40 INJECTION, POWDER, FOR SOLUTION INTRAVENOUS at 12:09

## 2022-01-01 RX ADMIN — SUCRALFATE 1 G: 1 SUSPENSION ORAL at 07:09

## 2022-01-01 RX ADMIN — INSULIN ASPART 2 UNITS: 100 INJECTION, SOLUTION INTRAVENOUS; SUBCUTANEOUS at 12:08

## 2022-01-01 RX ADMIN — SUCRALFATE 1 G: 1 SUSPENSION ORAL at 01:09

## 2022-01-01 RX ADMIN — SIMETHICONE 200 MG: 20 SUSPENSION/ DROPS ORAL at 03:08

## 2022-01-01 RX ADMIN — TACROLIMUS 1 MG: 1 CAPSULE ORAL at 05:08

## 2022-01-01 RX ADMIN — PROPOFOL 50 MG: 10 INJECTION, EMULSION INTRAVENOUS at 06:08

## 2022-01-01 RX ADMIN — NEPHROCAP 1 CAPSULE: 1 CAP ORAL at 08:08

## 2022-01-01 RX ADMIN — METOCLOPRAMIDE 5 MG: 5 INJECTION, SOLUTION INTRAMUSCULAR; INTRAVENOUS at 06:09

## 2022-01-01 RX ADMIN — TACROLIMUS 2 MG: 1 CAPSULE ORAL at 09:09

## 2022-01-01 RX ADMIN — MUPIROCIN: 20 OINTMENT TOPICAL at 09:08

## 2022-01-01 RX ADMIN — PROPOFOL 25 MG: 10 INJECTION, EMULSION INTRAVENOUS at 07:08

## 2022-01-01 RX ADMIN — SODIUM CHLORIDE: 9 INJECTION, SOLUTION INTRAVENOUS at 08:11

## 2022-01-01 RX ADMIN — PANTOPRAZOLE SODIUM 8 MG/HR: 40 INJECTION, POWDER, FOR SOLUTION INTRAVENOUS at 06:09

## 2022-01-01 RX ADMIN — METOCLOPRAMIDE 5 MG: 5 INJECTION, SOLUTION INTRAMUSCULAR; INTRAVENOUS at 05:09

## 2022-01-01 RX ADMIN — Medication 20 MG: at 02:09

## 2022-01-01 RX ADMIN — POTASSIUM CHLORIDE 30 MEQ: 750 TABLET, EXTENDED RELEASE ORAL at 12:08

## 2022-01-01 RX ADMIN — TACROLIMUS 1 MG: 1 CAPSULE ORAL at 08:08

## 2022-01-01 RX ADMIN — PROPOFOL 30 MG: 10 INJECTION, EMULSION INTRAVENOUS at 02:09

## 2022-01-01 RX ADMIN — SUCRALFATE 1 G: 1 SUSPENSION ORAL at 05:09

## 2022-01-01 RX ADMIN — POTASSIUM CHLORIDE 10 MEQ: 750 TABLET, EXTENDED RELEASE ORAL at 06:09

## 2022-01-01 RX ADMIN — SUCRALFATE 1 G: 1 SUSPENSION ORAL at 11:08

## 2022-01-01 RX ADMIN — INSULIN ASPART 4 UNITS: 100 INJECTION, SOLUTION INTRAVENOUS; SUBCUTANEOUS at 05:08

## 2022-01-01 RX ADMIN — IOHEXOL 75 ML: 350 INJECTION, SOLUTION INTRAVENOUS at 03:08

## 2022-01-01 RX ADMIN — TACROLIMUS 1 MG: 1 CAPSULE ORAL at 05:09

## 2022-01-01 RX ADMIN — SODIUM CHLORIDE, POTASSIUM CHLORIDE, SODIUM LACTATE AND CALCIUM CHLORIDE 500 ML: 600; 310; 30; 20 INJECTION, SOLUTION INTRAVENOUS at 11:08

## 2022-01-01 RX ADMIN — ATORVASTATIN CALCIUM 40 MG: 40 TABLET, FILM COATED ORAL at 09:08

## 2022-01-01 RX ADMIN — MUPIROCIN: 20 OINTMENT TOPICAL at 08:09

## 2022-01-01 RX ADMIN — INSULIN ASPART 3 UNITS: 100 INJECTION, SOLUTION INTRAVENOUS; SUBCUTANEOUS at 09:08

## 2022-01-01 RX ADMIN — TACROLIMUS 2 MG: 1 CAPSULE ORAL at 08:09

## 2022-01-01 RX ADMIN — PANTOPRAZOLE SODIUM 8 MG/HR: 40 INJECTION, POWDER, FOR SOLUTION INTRAVENOUS at 02:08

## 2022-01-01 RX ADMIN — INSULIN ASPART 2 UNITS: 100 INJECTION, SOLUTION INTRAVENOUS; SUBCUTANEOUS at 10:08

## 2022-01-01 RX ADMIN — PROPOFOL 100 MG: 10 INJECTION, EMULSION INTRAVENOUS at 08:11

## 2022-01-01 RX ADMIN — METOCLOPRAMIDE 10 MG: 5 INJECTION, SOLUTION INTRAMUSCULAR; INTRAVENOUS at 05:08

## 2022-01-01 RX ADMIN — PANTOPRAZOLE SODIUM 8 MG/HR: 40 INJECTION, POWDER, FOR SOLUTION INTRAVENOUS at 04:08

## 2022-01-01 RX ADMIN — INSULIN DETEMIR 2 UNITS: 100 INJECTION, SOLUTION SUBCUTANEOUS at 09:08

## 2022-01-01 RX ADMIN — ETOMIDATE 6 MG: 2 INJECTION, SOLUTION INTRAVENOUS at 07:08

## 2022-01-01 RX ADMIN — NEPHROCAP 1 CAPSULE: 1 CAP ORAL at 11:08

## 2022-01-01 RX ADMIN — ONDANSETRON 8 MG: 8 TABLET, ORALLY DISINTEGRATING ORAL at 02:08

## 2022-01-01 RX ADMIN — INSULIN DETEMIR 2 UNITS: 100 INJECTION, SOLUTION SUBCUTANEOUS at 10:08

## 2022-01-01 RX ADMIN — LEVOTHYROXINE SODIUM 50 MCG: 50 TABLET ORAL at 06:08

## 2022-01-01 RX ADMIN — SUCCINYLCHOLINE CHLORIDE 160 MG: 20 INJECTION, SOLUTION INTRAMUSCULAR; INTRAVENOUS at 06:08

## 2022-01-01 RX ADMIN — INSULIN ASPART 1 UNITS: 100 INJECTION, SOLUTION INTRAVENOUS; SUBCUTANEOUS at 11:09

## 2022-01-01 RX ADMIN — VECURONIUM BROMIDE 4 MG: 10 INJECTION, POWDER, LYOPHILIZED, FOR SOLUTION INTRAVENOUS at 08:08

## 2022-01-01 RX ADMIN — PANTOPRAZOLE SODIUM 8 MG/HR: 40 INJECTION, POWDER, FOR SOLUTION INTRAVENOUS at 09:08

## 2022-01-01 RX ADMIN — PANTOPRAZOLE SODIUM 40 MG: 40 INJECTION, POWDER, FOR SOLUTION INTRAVENOUS at 06:08

## 2022-01-01 RX ADMIN — CHLORHEXIDINE GLUCONATE 0.12% ORAL RINSE 15 ML: 1.2 LIQUID ORAL at 09:08

## 2022-01-01 RX ADMIN — EPINEPHRINE 20 MCG: 1 INJECTION INTRAMUSCULAR; INTRAVENOUS; SUBCUTANEOUS at 08:08

## 2022-01-01 RX ADMIN — PANTOPRAZOLE SODIUM 8 MG/HR: 40 INJECTION, POWDER, FOR SOLUTION INTRAVENOUS at 05:09

## 2022-01-01 RX ADMIN — Medication 25 MCG/HR: at 09:08

## 2022-01-01 RX ADMIN — FUROSEMIDE 80 MG: 40 TABLET ORAL at 09:09

## 2022-01-01 RX ADMIN — LEVOTHYROXINE SODIUM 50 MCG: 50 TABLET ORAL at 08:09

## 2022-01-01 RX ADMIN — PANTOPRAZOLE SODIUM 8 MG/HR: 40 INJECTION, POWDER, FOR SOLUTION INTRAVENOUS at 05:08

## 2022-01-01 RX ADMIN — MUPIROCIN: 20 OINTMENT TOPICAL at 09:09

## 2022-01-01 RX ADMIN — PANTOPRAZOLE SODIUM 8 MG/HR: 40 INJECTION, POWDER, FOR SOLUTION INTRAVENOUS at 06:08

## 2022-01-01 RX ADMIN — LIDOCAINE HYDROCHLORIDE 100 MG: 20 INJECTION, SOLUTION EPIDURAL; INFILTRATION; INTRACAUDAL; PERINEURAL at 06:08

## 2022-01-01 RX ADMIN — EPOETIN ALFA-EPBX 4000 UNITS: 4000 INJECTION, SOLUTION INTRAVENOUS; SUBCUTANEOUS at 02:08

## 2022-01-01 RX ADMIN — DOCUSATE SODIUM AND SENNOSIDES 2 TABLET: 8.6; 5 TABLET, FILM COATED ORAL at 09:08

## 2022-01-01 RX ADMIN — METOCLOPRAMIDE 5 MG: 5 INJECTION, SOLUTION INTRAMUSCULAR; INTRAVENOUS at 11:09

## 2022-01-01 RX ADMIN — MAGNESIUM SULFATE HEPTAHYDRATE 2 G: 40 INJECTION, SOLUTION INTRAVENOUS at 12:08

## 2022-01-01 RX ADMIN — INSULIN ASPART 3 UNITS: 100 INJECTION, SOLUTION INTRAVENOUS; SUBCUTANEOUS at 08:08

## 2022-01-01 RX ADMIN — PANTOPRAZOLE SODIUM 8 MG/HR: 40 INJECTION, POWDER, FOR SOLUTION INTRAVENOUS at 01:08

## 2022-01-01 RX ADMIN — ACETAMINOPHEN 650 MG: 325 SUPPOSITORY RECTAL at 06:09

## 2022-01-01 RX ADMIN — ONDANSETRON 4 MG: 2 INJECTION INTRAMUSCULAR; INTRAVENOUS at 02:08

## 2022-01-01 RX ADMIN — SODIUM BICARBONATE 650 MG TABLET 650 MG: at 01:09

## 2022-01-01 RX ADMIN — PANTOPRAZOLE SODIUM 8 MG/HR: 40 INJECTION, POWDER, FOR SOLUTION INTRAVENOUS at 12:09

## 2022-01-01 RX ADMIN — SUCRALFATE 1 G: 1 SUSPENSION ORAL at 11:09

## 2022-01-01 RX ADMIN — SUCRALFATE 1 G: 1 SUSPENSION ORAL at 06:09

## 2022-01-01 RX ADMIN — ONDANSETRON 8 MG: 8 TABLET, ORALLY DISINTEGRATING ORAL at 11:09

## 2022-01-01 RX ADMIN — ATORVASTATIN CALCIUM 40 MG: 40 TABLET, FILM COATED ORAL at 09:09

## 2022-01-01 RX ADMIN — SEVELAMER CARBONATE 1600 MG: 800 TABLET, FILM COATED ORAL at 04:08

## 2022-01-01 RX ADMIN — PROMETHAZINE HYDROCHLORIDE 12.5 MG: 25 INJECTION INTRAMUSCULAR; INTRAVENOUS at 08:08

## 2022-01-01 RX ADMIN — EPINEPHRINE 0.9 MG: 0.1 INJECTION, SOLUTION ENDOTRACHEAL; INTRACARDIAC; INTRAVENOUS at 04:08

## 2022-01-01 RX ADMIN — ONDANSETRON 8 MG: 8 TABLET, ORALLY DISINTEGRATING ORAL at 09:09

## 2022-01-01 RX ADMIN — SODIUM CHLORIDE: 9 INJECTION, SOLUTION INTRAVENOUS at 03:09

## 2022-01-01 RX ADMIN — CYANOCOBALAMIN 1000 MCG: 1000 INJECTION, SOLUTION INTRAMUSCULAR; SUBCUTANEOUS at 11:08

## 2022-01-01 RX ADMIN — MIDODRINE HYDROCHLORIDE 2.5 MG: 2.5 TABLET ORAL at 09:09

## 2022-01-01 RX ADMIN — FUROSEMIDE 80 MG: 80 TABLET ORAL at 09:08

## 2022-01-01 RX ADMIN — PANTOPRAZOLE SODIUM 40 MG: 40 TABLET, DELAYED RELEASE ORAL at 08:09

## 2022-01-01 RX ADMIN — MAGNESIUM OXIDE TAB 400 MG (241.3 MG ELEMENTAL MG) 800 MG: 400 (241.3 MG) TAB at 12:08

## 2022-01-01 RX ADMIN — METOCLOPRAMIDE 5 MG: 5 INJECTION, SOLUTION INTRAMUSCULAR; INTRAVENOUS at 11:08

## 2022-01-01 RX ADMIN — MIDODRINE HYDROCHLORIDE 2.5 MG: 2.5 TABLET ORAL at 05:09

## 2022-01-01 RX ADMIN — LEVOTHYROXINE SODIUM 50 MCG: 50 TABLET ORAL at 09:09

## 2022-01-01 RX ADMIN — POLYETHYLENE GLYCOL 3350 17 G: 17 POWDER, FOR SOLUTION ORAL at 08:09

## 2022-01-01 RX ADMIN — PANTOPRAZOLE SODIUM 8 MG/HR: 40 INJECTION, POWDER, FOR SOLUTION INTRAVENOUS at 07:09

## 2022-01-01 RX ADMIN — SODIUM CHLORIDE, SODIUM LACTATE, POTASSIUM CHLORIDE, AND CALCIUM CHLORIDE: 600; 310; 30; 20 INJECTION, SOLUTION INTRAVENOUS at 06:08

## 2022-01-01 RX ADMIN — SEVELAMER CARBONATE 1600 MG: 800 TABLET, FILM COATED ORAL at 11:08

## 2022-01-01 RX ADMIN — PANTOPRAZOLE SODIUM 8 MG/HR: 40 INJECTION, POWDER, FOR SOLUTION INTRAVENOUS at 10:09

## 2022-01-01 RX ADMIN — PANTOPRAZOLE SODIUM 8 MG/HR: 40 INJECTION, POWDER, FOR SOLUTION INTRAVENOUS at 12:08

## 2022-01-01 RX ADMIN — PANTOPRAZOLE SODIUM 40 MG: 40 INJECTION, POWDER, FOR SOLUTION INTRAVENOUS at 04:08

## 2022-01-01 RX ADMIN — TACROLIMUS 2 MG: 1 CAPSULE ORAL at 10:08

## 2022-01-01 RX ADMIN — FUROSEMIDE 80 MG: 80 TABLET ORAL at 08:08

## 2022-01-01 RX ADMIN — PROPOFOL 10 MG: 10 INJECTION, EMULSION INTRAVENOUS at 08:11

## 2022-01-01 RX ADMIN — ONDANSETRON 4 MG: 2 INJECTION INTRAMUSCULAR; INTRAVENOUS at 01:09

## 2022-01-01 RX ADMIN — ETOMIDATE 20 MG: 2 INJECTION, SOLUTION INTRAVENOUS at 06:08

## 2022-01-01 RX ADMIN — SODIUM CHLORIDE: 9 INJECTION, SOLUTION INTRAVENOUS at 01:09

## 2022-01-01 RX ADMIN — SUCRALFATE 1 G: 1 SUSPENSION ORAL at 12:09

## 2022-01-01 RX ADMIN — INSULIN ASPART 6 UNITS: 100 INJECTION, SOLUTION INTRAVENOUS; SUBCUTANEOUS at 06:08

## 2022-01-01 RX ADMIN — SODIUM CHLORIDE: 9 INJECTION, SOLUTION INTRAVENOUS at 05:09

## 2022-01-01 RX ADMIN — LIDOCAINE HYDROCHLORIDE 50 MG: 10 INJECTION, SOLUTION EPIDURAL; INFILTRATION; INTRACAUDAL; PERINEURAL at 08:11

## 2022-01-01 RX ADMIN — MAGNESIUM SULFATE HEPTAHYDRATE 2 G: 40 INJECTION, SOLUTION INTRAVENOUS at 10:08

## 2022-01-01 RX ADMIN — IRON SUCROSE 200 MG: 20 INJECTION, SOLUTION INTRAVENOUS at 11:08

## 2022-01-01 RX ADMIN — PANTOPRAZOLE SODIUM 8 MG/HR: 40 INJECTION, POWDER, FOR SOLUTION INTRAVENOUS at 08:08

## 2022-01-01 RX ADMIN — SODIUM BICARBONATE 650 MG TABLET 650 MG: at 08:09

## 2022-01-01 RX ADMIN — INSULIN ASPART 2 UNITS: 100 INJECTION, SOLUTION INTRAVENOUS; SUBCUTANEOUS at 09:08

## 2022-01-01 RX ADMIN — TACROLIMUS 2 MG: 1 CAPSULE ORAL at 07:08

## 2022-01-01 RX ADMIN — INSULIN ASPART 2 UNITS: 100 INJECTION, SOLUTION INTRAVENOUS; SUBCUTANEOUS at 02:08

## 2022-01-01 RX ADMIN — MIDAZOLAM HYDROCHLORIDE 2 MG: 1 INJECTION, SOLUTION INTRAMUSCULAR; INTRAVENOUS at 08:08

## 2022-01-01 RX ADMIN — Medication 0.02 MCG/KG/MIN: at 03:08

## 2022-01-01 RX ADMIN — PROPOFOL 50 MG: 10 INJECTION, EMULSION INTRAVENOUS at 07:08

## 2022-01-01 RX ADMIN — SODIUM CHLORIDE: 9 INJECTION, SOLUTION INTRAVENOUS at 03:08

## 2022-01-01 RX ADMIN — PANTOPRAZOLE SODIUM 40 MG: 40 INJECTION, POWDER, FOR SOLUTION INTRAVENOUS at 09:09

## 2022-01-01 RX ADMIN — SODIUM CHLORIDE, POTASSIUM CHLORIDE, SODIUM LACTATE AND CALCIUM CHLORIDE 1000 ML: 600; 310; 30; 20 INJECTION, SOLUTION INTRAVENOUS at 06:08

## 2022-01-01 RX ADMIN — SEVELAMER CARBONATE 1600 MG: 800 TABLET, FILM COATED ORAL at 07:08

## 2022-01-01 RX ADMIN — SUCRALFATE 1 G: 1 SUSPENSION ORAL at 05:08

## 2022-01-01 RX ADMIN — SUCRALFATE 1 G: 1 SUSPENSION ORAL at 02:08

## 2022-01-01 RX ADMIN — SODIUM CHLORIDE: 9 INJECTION, SOLUTION INTRAVENOUS at 10:09

## 2022-01-01 RX ADMIN — GLYCOPYRROLATE 0.2 MG: 0.2 INJECTION, SOLUTION INTRAMUSCULAR; INTRAVITREAL at 08:11

## 2022-01-01 RX ADMIN — REGADENOSON 0.4 MG: 0.08 INJECTION, SOLUTION INTRAVENOUS at 08:06

## 2022-01-01 RX ADMIN — KETAMINE HYDROCHLORIDE 50 MG: 50 INJECTION, SOLUTION, CONCENTRATE INTRAMUSCULAR; INTRAVENOUS at 08:08

## 2022-01-01 RX ADMIN — INSULIN ASPART 3 UNITS: 100 INJECTION, SOLUTION INTRAVENOUS; SUBCUTANEOUS at 01:08

## 2022-01-01 RX ADMIN — EPINEPHRINE 10 MCG: 1 INJECTION INTRAMUSCULAR; INTRAVENOUS; SUBCUTANEOUS at 06:08

## 2022-01-01 RX ADMIN — INSULIN DETEMIR 2 UNITS: 100 INJECTION, SOLUTION SUBCUTANEOUS at 08:08

## 2022-01-01 RX ADMIN — MAGNESIUM SULFATE HEPTAHYDRATE 2 G: 40 INJECTION, SOLUTION INTRAVENOUS at 02:08

## 2022-01-01 RX ADMIN — FUROSEMIDE 80 MG: 80 TABLET ORAL at 12:08

## 2022-01-01 RX ADMIN — POTASSIUM CHLORIDE 40 MEQ: 1500 TABLET, EXTENDED RELEASE ORAL at 09:09

## 2022-01-01 RX ADMIN — POTASSIUM CHLORIDE 20 MEQ: 1500 TABLET, EXTENDED RELEASE ORAL at 09:09

## 2022-01-01 RX ADMIN — SODIUM CHLORIDE: 9 INJECTION, SOLUTION INTRAVENOUS at 04:08

## 2022-01-01 RX ADMIN — LIDOCAINE HYDROCHLORIDE 50 MG: 20 INJECTION, SOLUTION EPIDURAL; INFILTRATION; INTRACAUDAL; PERINEURAL at 03:08

## 2022-01-21 ENCOUNTER — TELEPHONE (OUTPATIENT)
Dept: TRANSPLANT | Facility: CLINIC | Age: 67
End: 2022-01-21
Payer: MEDICARE

## 2022-01-21 NOTE — TELEPHONE ENCOUNTER
Notified by [facility] nurse that patient called her ; he is inpatient at [facility] with COVID , she said.

## 2022-01-27 ENCOUNTER — TELEPHONE (OUTPATIENT)
Dept: TRANSPLANT | Facility: CLINIC | Age: 67
End: 2022-01-27
Payer: MEDICARE

## 2022-01-27 LAB
EXT ALBUMIN: 2.2
EXT ALKALINE PHOSPHATASE: 98
EXT ALT: 15
EXT AST: 8
EXT BILIRUBIN TOTAL: 0.3
EXT BUN: 107
EXT CALCIUM: 7.7
EXT CHLORIDE: 98
EXT CO2: 29
EXT CREATININE: 5.4 MG/DL
EXT GLUCOSE: 247
EXT HEMATOCRIT: 27
EXT HEMOGLOBIN: 9.1
EXT LYMPH%: 4.7
EXT MAGNESIUM: 2.4
EXT PLATELETS: 269
EXT POTASSIUM: 4.9
EXT PROTEIN TOTAL: 6.1
EXT SEGS%: 78.2
EXT SODIUM: 135 MMOL/L
EXT TACROLIMUS LVL: 10.5
EXT WBC: 6

## 2022-01-27 NOTE — TELEPHONE ENCOUNTER
----- Message from Suha Robledo sent at 1/27/2022  9:38 AM CST -----  Regarding: FW: call back    ----- Message -----  From: Darrell Vaughn  Sent: 1/26/2022   4:32 PM CST  To: [facility] Pre-Kidney Transplant Clinical  Subject: call back                                        Pt call to speak with coordinator in regards to his labs results    Call

## 2022-01-27 NOTE — TELEPHONE ENCOUNTER
"Returned patient call, informed him labs have been received from [facility].  He reported he is "in the dialysis unit right now".  Patient reported his Prograf dose is " same as before: 2 in the morning and 1 at night", he said.  Informed him labs to be reviewed by .   "

## 2022-01-27 NOTE — TELEPHONE ENCOUNTER
----- Message from Suha Robledo sent at 1/27/2022  9:38 AM CST -----  Regarding: FW: call back    ----- Message -----  From: Darrell Vaughn  Sent: 1/26/2022   4:32 PM CST  To: [facility] Pre-Kidney Transplant Clinical  Subject: call back                                        Pt call to speak with coordinator in regards to his labs results    Call

## 2022-01-28 ENCOUNTER — COMMITTEE REVIEW (OUTPATIENT)
Dept: TRANSPLANT | Facility: CLINIC | Age: 67
End: 2022-01-28
Payer: MEDICARE

## 2022-01-28 ENCOUNTER — TELEPHONE (OUTPATIENT)
Dept: TRANSPLANT | Facility: CLINIC | Age: 67
End: 2022-01-28
Payer: MEDICARE

## 2022-01-28 NOTE — COMMITTEE REVIEW
Native Organ Dx: Hypertensive Nephrosclerosis      SELECTION COMMITTEE NOTE    Alon Adamson was presented at selection committee on 1/28/2022.  Patient met selection criteria for kidney transplant related to ESRD due to  Hypertensive Nephrosclerosis .  No absolute contraindications to transplant at this time.  Patient will be placed on the cadaveric wait list pending final financial approval from insurance company.  Patient will return to clinic for routine appointment in 6 month(s). Patient does not meet criteria for High KDPI kidney offer. Patient meets HCV+ kidney offer. Patient does not meet criteria for dual/enbloc, due to weight. Verify albumin level at time of organ offer.    Patient informed of committee's decision. All questions were answered and patient verbalized understanding.       Note written by Suha Robledo RN    ===============================================    I was present at the meeting and attest to the decision of the committee.

## 2022-01-28 NOTE — LETTER
January 28, 2022    Sampson Jordan  [address] [address]     Dear Dr. Jordan:    Patient: Alon Adamson   MR Number: [id number]   YOB: 1955     Your patient, Alon Adamson, was recently discussed at the [facility] Selection Committee meeting on 1/28/2022. I am happy to inform you that Alon has been approved for transplantation.  He has met selection criteria for a kidney transplant related to  of Hypertensive Nephrosclerosis. Your patient will be placed on the cadaveric wait list pending final financial approval from insurance company.     We appreciate your confidence in allowing us to participate in your patients care.  If you have any questions or concerns, please do not hesitate to contact me.    Sincerely,    Francine Bell MD  Medical Director, Kidney & Kidney/Pancreas Transplantation    Tj/Enclosed    Cc:[facility]

## 2022-02-01 ENCOUNTER — PATIENT MESSAGE (OUTPATIENT)
Dept: TRANSPLANT | Facility: CLINIC | Age: 67
End: 2022-02-01
Payer: MEDICARE

## 2022-02-01 ENCOUNTER — TELEPHONE (OUTPATIENT)
Dept: TRANSPLANT | Facility: CLINIC | Age: 67
End: 2022-02-01
Payer: MEDICARE

## 2022-02-01 NOTE — TELEPHONE ENCOUNTER
Patient notified and instructed via [facility]:    Liver Tests were ok, no medicine changes made. Repeat labs due 2/14/22. Thanks.

## 2022-02-01 NOTE — TELEPHONE ENCOUNTER
----- Message from Miriam García MD sent at 1/27/2022  2:00 PM CST -----  For some reason tacro higher on these labs than usual. Liver tests are ok. Repeat labs in 2 weeks.

## 2022-02-07 ENCOUNTER — TELEPHONE (OUTPATIENT)
Dept: HEPATOLOGY | Facility: CLINIC | Age: 67
End: 2022-02-07
Payer: MEDICARE

## 2022-02-07 RX ORDER — CYCLOBENZAPRINE HCL 5 MG
5 TABLET ORAL 3 TIMES DAILY PRN
COMMUNITY
Start: 2022-01-27

## 2022-02-07 NOTE — TELEPHONE ENCOUNTER
Patient phoned stating that his nephrologist has prescribed several new medications and he wants to know if these are ok with you. The medications are as follows:    Tamsulosin 4mg 1 tablet daily    Flexeril 5mg 1 tablet 3x daily prn    Nephro-bite  1 daily    Gentamycin

## 2022-02-14 LAB
EXT ALBUMIN: 3
EXT ALKALINE PHOSPHATASE: 105
EXT ALT: 13
EXT AST: 12
EXT BILIRUBIN TOTAL: 0.7
EXT BUN: 69
EXT CALCIUM: 9.2
EXT CHLORIDE: 95
EXT CO2: 30
EXT CREATININE: 6.4 MG/DL
EXT EOSINOPHIL%: 0.6
EXT GFR MDRD NON AF AMER: 9
EXT GLUCOSE: 213
EXT HEMATOCRIT: 30.2
EXT HEMOGLOBIN: 10.1
EXT LYMPH%: 5.6
EXT MAGNESIUM: 2.7
EXT MONOCYTES%: 13.3
EXT PLATELETS: 238
EXT POTASSIUM: 4.8
EXT PROTEIN TOTAL: 8
EXT SEGS%: 80.1
EXT SODIUM: 131 MMOL/L
EXT TACROLIMUS LVL: 4.2
EXT WBC: 6.9

## 2022-02-23 NOTE — ASSESSMENT & PLAN NOTE
- With CKD3.  - Expected VICKY post txp.   - UOP unclear as pt unable to measure urine (severe scrotal edema- improving) though feels UOP improving.  - Tolerated HD 8/1 and 8/3.   - Nephrology following and HD per their recs.   - Lasix 120 mg given 8/9/18.    - Cr trended 4 ->3.8-> 3.6-> 3.4 ->3.4.  BUN trending up slow, patient on Pred taper.  - Continue Lasix 80 mg bid.    Seen for video visit 10-19-21 to follow up in 6 months.  Last filled 10-12-21 #22

## 2022-02-25 ENCOUNTER — TELEPHONE (OUTPATIENT)
Dept: TRANSPLANT | Facility: CLINIC | Age: 67
End: 2022-02-25
Payer: MEDICARE

## 2022-02-25 NOTE — TELEPHONE ENCOUNTER
----- Message from Miriam García MD sent at 2/17/2022  3:32 PM CST -----  Reviewed, nothing to do; repeat per routine

## 2022-03-09 ENCOUNTER — TELEPHONE (OUTPATIENT)
Dept: TRANSPLANT | Facility: CLINIC | Age: 67
End: 2022-03-09
Payer: MEDICARE

## 2022-03-09 DIAGNOSIS — Z76.82 ORGAN TRANSPLANT CANDIDATE: Primary | ICD-10-CM

## 2022-03-09 NOTE — LETTER
March 9, 2022    Alon Adamson  [address] [facility] [place] [address]    Dear Alon Adamson:  MRN: [id number]    Congratulations!  This letter is to inform you that you were placed on the kidney transplant waiting list at [facility] on 3/9/2022.  You will be listed as Status 7 (inactive status) until you are re-assess by our kidney transplant team and your kidney evaluation is up to date.  Your candidacy for kidney transplant is based on the following criteria: ESRD due to Hypertensive Nephrosclerosis.      If you have any alternate phone numbers that you would like us to have, please call us with those numbers.  During this time period, you should prepare yourself mentally and make plans for your affairs during the time of your transplant.  You should be ready to leave your home within 15 minutes of receiving the phone call telling you to come in for transplant.     The [facility] [employer] and the United Network for Organ Sharing requires that we inform any patient placed on our waiting list of information that would impact their ability to receive a transplant.  [facility]s kidney transplant program has a transplant surgeon and physician available 365 days a year, 24 hours a day and 7 days a week to facilitate organ acceptance, procurement, and implantation, and to address urgent patient issues.  You will be notified in writing of any changes to our key transplant personnel and of any changes to our staffing plan that would impact your ability to receive a transplant.    Attached is a letter from the United Network for Organ Sharing (UNOS).  It describes the services and information offered to patients by UNOS and the Organ Procurement and Transplant Network.    Again, we congratulate you on your success and look forward to working with you very closely in the future.  If you have any questions or require any additional information, please do not hesitate to contact the pre-transplant office at [phone].    Sincerely,    Francine Bell MD  Medical Director, Kidney & Kidney/Pancreas Transplantation    Tj/Enclosed     Cc: [facility]             The Organ Procurement and Transplantation Network   Toll-free patient services line: Your resource for organ transplant information     If you have a question regarding your own medical care, you always should call your transplant hospital first. However, for general organ transplant-related information, you can call the Organ Procurement and Transplantation Network (OPTN) toll-free patient services line at [phone].     Anyone, including potential transplant candidates, candidates, recipients, family members, friends, living donors, and donor family members, can call this number to:     · Talk about organ donation, living donation, the transplant process, the donation process, and transplant policies.   · Get a free patient information kit with helpful booklets, waiting list and transplant information, and a list of all transplant hospitals.   · Ask questions about the OPTN website (https://optn.transplant.hrsa.gov/), the United Network for Organ Sharings (UNOS) website (https://unos.org/), or the UNOS website for living donors and transplant recipients. (https://www.transplantliving.org/).   · Learn how the OPTN can help you.   · Talk about any concerns that you may have with a transplant hospital.     The nations transplant system, the OPTN, is managed under federal contract by the United Network for Organ Sharing (UNOS), which is a non-profit charitable organization. The OPTN helps create and define organ sharing policies that make the best use of donated organs. This process continuously evaluating new advances and discoveries so policies can be adapted to best serve patients waiting for transplants. To do so, the OPTN works closely with transplant professionals, transplant patients, transplant candidates, donor families, living  donors, and the public. All transplant programs and organ procurement organizations throughout the country are OPTN members and are obligated to follow the policies the OPTN creates for allocating organs.     The OPTN also is responsible for:   · Providing educational material for patients, the public, and professionals.   · Raising awareness of the need for donated organs and tissue.   · Coordinating organ procurement, matching, and placement.   · Collecting information about every organ transplant and donation that occurs in the [place].     Remember, you should contact your transplant hospital directly if you have questions or concerns about your own medical care including medical records, work-up progress, and test results.     We are not your transplant hospital, and our staff will not be able to answer questions about your case, so please keep your transplant hospitals phone number handy.   However, while you research your transplant needs and learn as much as you can about transplantation and donation, we welcome your call to our toll-free patient services line at [phone].

## 2022-03-09 NOTE — TELEPHONE ENCOUNTER
"  KIDNEY WAIT LISTING NOTE    Date of Financial clearance to list: 3/3/22    SSN/[facility]:     Organ: Kidney    Last Name: Snow  First Name: Alon    : 1955       Gender: male        MRN#: [id number]                                   State of Permanent Residence:  [facility] [place] [address]    Ethnicity: Not  or /a   Race:      White    CLINICAL INFORMATION   Candidate Medical Urgency Status: Temporarily Inactive (7) Candidate work-up incomplete  Number of Previous Kidney Transplants: 0  Number of Previous Solid Organ Transplants: 1  Did you enter number of previous kidney or other solid organ transplants? yes  Is this Candidate a Prior Living Donor: no  (If yes, please generate letter to UNOS with patient's date of donation, recipient SSN, signed by Surgical Director after patient is listed in order to receive priority points).      ABO  ABO Blood Group:   A POS     ABO Confirmation: (THESE DATES MUST BE PRIOR TO THE LIST DATE AND SUPPORTED BY SEPARATE LAB REPORTS)    Internal Results    Lab Results   Component Value Date    GROUPTRH A POS 2021    GROUPTRH A POS 2018     No results found for: ABO    External Results    ABO Date 1:    ABO Date 2  Are either of these ABO results based on External Labs? no  (If Yes, STOP and go to source document in Media Tab for verification).    VITALS  Height:  6' 1.7"  Weight:  244 lbs  (Use height from Transplant clinic visits only).  Did you enter height/weight? Yes    HLA    Class I:  Lab Results   Component Value Date    CEWW4SD 2 2021    EYYN8UB XX 2021    XMLQ5ZQ 7 2021    LMJU0BO 44 2021    ZYVEE2UF 6 2021    EDIOV3JX 4 2021    CIPWC3LV 5 2021    UQINP0JW 7 2021       Class II:  Lab Results   Component Value Date    DTZDPU46MJ 7 2021    HVKBJG20HR 13 2021    IFAPVN546DR 52 2021    AUWIDL7237 XX 2021    YADWO1KX 2 2021    YNECG3WQ 7 2021 " "      Tested for HLA Antibodies: Yes, no antibodies detected     If result is "Positive" antibodies are detected     If result is "Negative or questionable" no antibodies detected    Lab Results   Component Value Date    CIPRAS Negative 03/03/2021    CIIPRAS Negative 03/03/2021       DIALYSIS INFORMATION  Is patient Pre-Dialysis: No     GFR Information  Report GFR being used as the criteria for placement on the kidney list. If not, leave blank  GFR < or = 20 ml/min? No  If Yes, Specify value  ___   ml/min     Initial date GFR became 20 or less:   Is GFR obtained from an Outside lab Result? n/a  (If YES verify with source document scanned into media)    If patient on Dialysis:    Is candidate currently on dialysis for ESRD? Yes  If Yes,  Date Chronic Dialysis Started: 1/20/22    (verify with source document in Media Tab)   Dialysis Unit Name: [facility]  [address] [place] [id number]                        Physician Name:  Dr. Francine Munguia  NPI#: [id number]    DIABETES INFORMATION  Primary Native Kidney Diagnosis: Hypertensive Nephrosclerosis  C-Peptide Value - No results found for: CPEPTIDE  Current Diabetes Status: None    FOR NON-KIDNEY DEPARTMENT USE ONLY:  Additional Organs Registered? none    Maximum Acceptable Number of HLA Mismatches  ABDR:     6      (0-6)               AB:               (0-4)  ADR:   _____  (0-4)              BDR: _____ (0-4)  A:        _____  (0-2)              B:      _____ (0-2)          DR: ______ (0-2)    Will Recipient Accept?   Accept HBcAB Positive Organ:            Yes  Accept HBV TAMAR Positive Organ:        no  Accept HCV Antibody Positive Organ: yes   Accept HCV TAMAR Positive Organ: yes    Dual Kidney and En Bloc Opt In : No  Dual  Local:   No  Dual Import:   No  En Bloc Local:   No  En Bloc Import: No     Accept KDPI > 85: Single: No     Local: No     Import: No  Accept KDPI > 85: Dual: No     Local: No     Import: No      ### NURSE TO VERIFY CONSENT AND " MAKE ANY NECESSARY CHANGES NEEDED IN UNET AT THE TIME OF VERIFICATION ###    Unacceptible Antigens  If yes, list     No results found for: DE0PDXP, CIABCLM, CIIAB    ### DO NOT LIST IF ANTIGEN VALUE WEAK ###    ABO, 2728 and all serologies were verified by myself and Lila.

## 2022-03-17 DIAGNOSIS — Z76.82 ORGAN TRANSPLANT CANDIDATE: Primary | ICD-10-CM

## 2022-03-23 ENCOUNTER — TELEPHONE (OUTPATIENT)
Dept: TRANSPLANT | Facility: CLINIC | Age: 67
End: 2022-03-23
Payer: MEDICARE

## 2022-03-28 ENCOUNTER — TELEPHONE (OUTPATIENT)
Dept: HEPATOLOGY | Facility: CLINIC | Age: 67
End: 2022-03-28
Payer: MEDICARE

## 2022-03-28 NOTE — TELEPHONE ENCOUNTER
Spoke with Dr. Miriam García in regards to critical lab value.     Per Dr. García- ok he is almost on HD and followed by nephrology, its ok

## 2022-03-30 LAB
EXT ALBUMIN: 2.6
EXT ALKALINE PHOSPHATASE: 69
EXT ALT: 9
EXT AST: 6
EXT BILIRUBIN TOTAL: 0.3
EXT BUN: 45
EXT CALCIUM: 8.7
EXT CHLORIDE: 98
EXT CO2: 31
EXT CREATININE: 5.3 MG/DL
EXT EOSINOPHIL%: 1.3
EXT GFR MDRD NON AF AMER: 11
EXT GLUCOSE: 195
EXT HEMATOCRIT: 25.6
EXT HEMOGLOBIN: 8.6
EXT LYMPH%: 8.2
EXT MAGNESIUM: 2.1
EXT MONOCYTES%: 7.7
EXT PLATELETS: 257
EXT POTASSIUM: 4
EXT PROTEIN TOTAL: 6.3
EXT SEGS%: 82.1
EXT SODIUM: 137 MMOL/L
EXT TACROLIMUS LVL: 4.3
EXT WBC: 7.5

## 2022-04-01 ENCOUNTER — TELEPHONE (OUTPATIENT)
Dept: TRANSPLANT | Facility: CLINIC | Age: 67
End: 2022-04-01
Payer: MEDICARE

## 2022-04-01 NOTE — TELEPHONE ENCOUNTER
----- Message from Miriam García MD sent at 4/1/2022  3:25 PM CDT -----  Reviewed, nothing to do; repeat per routine

## 2022-04-19 ENCOUNTER — TELEPHONE (OUTPATIENT)
Dept: TRANSPLANT | Facility: CLINIC | Age: 67
End: 2022-04-19
Payer: MEDICARE

## 2022-04-19 NOTE — TELEPHONE ENCOUNTER
"Returned pt's call. Pt reports that he is at [facility], admitted for fluid around the lungs. A paracentesis will be performed.     ----- Message from Ann Marie Mckeon sent at 4/18/2022  4:02 PM CDT -----  Regarding: FW: Speak with coordinator  Contact: Alon    ----- Message -----  From: Kathia Griffin  Sent: 4/18/2022   3:49 PM CDT  To: Kidney Waitlisted Coordinator  Subject: Speak with coordinator                           Consult/Advisory:           Name Of Caller: Alon  Contact Preference?: [phone]           What is the nature of the call?:    Pt is calling to speak with Mookie in regards to pt being in hospital at [facility]      Additional Notes:  "Thank you for all that you do for our patients'"         "

## 2022-05-04 ENCOUNTER — PATIENT MESSAGE (OUTPATIENT)
Dept: HEPATOLOGY | Facility: CLINIC | Age: 67
End: 2022-05-04
Payer: MEDICARE

## 2022-05-18 NOTE — TELEPHONE ENCOUNTER
----- Message from Miriam García MD sent at 5/12/2022  4:30 PM CDT -----  Reviewed, nothing to do; repeat per routine

## 2022-06-03 PROBLEM — Z76.82 PATIENT ON WAITING LIST FOR KIDNEY TRANSPLANT: Status: ACTIVE | Noted: 2022-01-01

## 2022-06-03 NOTE — PROGRESS NOTES
Kidney Transplant Recipient Reevalulation    Referring Physician: Mckinley Rivera, Sampson Jordan  Current Nephrologist: Sampson Jordan  Waitlist Status: inactive  Dialysis Start Date: 1/20/2022    Subjective:     CC:  Annual reassessment of kidney transplant candidacy.    HPI:  Mr. Adamson is a 67 y.o. year old White male with ESRD secondary to HTN.  He has been on the wait list for a kidney transplant at [facility] since 1/20/2022. Patient is currently pre-dialysis. He has a none. Patient denies any recent hospitalizations or ED visits.    Inactive due to being out of date on imaging.     Hospitalizations/ED visits:  4/2022--pleural effusion, thoracentesis, chest tube--needs pulmonary f/u     Interval History:   Stated PD 2/2022. Reports doing well. No peritonitis. Reports having frequent hypotension when he first started PD. Now adjust PD bags and has SBP x1 a week SBP 80s-70s      CXR: 3/17/22 Left pleural effusion and left basal atelectasis versus infiltrate  PXR: 3/3/21 per surgeon test result is favorable for transplant  Renal US: 3/17/22 unremarkable  Iliacs : 3/3/21 per surgeon test result is favorable for transplant  Echo:  Results for orders placed during the hospital encounter of 06/03/22    Echo    Interpretation Summary  · The left ventricle is normal in size with concentric remodeling and normal systolic function.  · Small posterolateral pericardial effusion.  · The estimated ejection fraction is 60%.  · Normal left ventricular diastolic function.  · Aortic sclerosis with trivial stenosis.  · Mild tricuspid regurgitation.  · There is abnormal septal wall motion most likely consistent with right bundle branch block.  · Normal right ventricular size with normal right ventricular systolic function.  · Normal central venous pressure (3 mmHg).  · Insufficient TR envelope for PA pressure estimation. Measurement attempted.      Stress:  Results for orders placed during the hospital encounter of  06/03/22    Lexiscan Card Interp    Interpretation Summary    Normal myocardial perfusion scan. There is no evidence of myocardial ischemia or infarction.    There is a mild intensity fixed perfusion abnormality in the inferior wall of the left ventricle, secondary to soft tissue attenuation.    There is a moderate intensity perfusion abnormality in the  wall of the left ventricle consistent with the RV insertion site.    The LVEF is not accurate due to poor software boundary tracking. The visually estimated ejection fraction is mildly reduced.    There is mild global hypokinesis at rest and following stress.    The EKG portion of this study is negative for ischemia.    The patient reported no chest pain during the stress test.    There were no arrhythmias during stress.    There are no prior studies for comparison.      Colonoscopy: 12/20/21 Tubular adenoma, fragments of, with focal villous features   - No evidence of high-grade dysplasia or malignancy  PTH:  Lab Results   Component Value Date    .6 (H) 06/03/2022    CALCIUM 7.5 (L) 03/03/2021    CAION 1.06 08/01/2018    PHOS 5.7 (H) 03/03/2021     PSA:  PSA, Screen (ng/mL)   Date Value   06/03/2022 1.6         Current Outpatient Medications:     aspirin (ECOTRIN) 81 MG EC tablet, Take 81 mg by mouth once daily. , Disp: , Rfl:     cyclobenzaprine (FLEXERIL) 5 MG tablet, Take 5 mg by mouth 3 (three) times daily as needed., Disp: , Rfl:     ergocalciferol (ERGOCALCIFEROL) 50,000 unit Cap, Take 1 capsule by mouth once a week., Disp: , Rfl:     furosemide (LASIX) 40 MG tablet, Take 80 mg by mouth once daily. Take 80 mg (1 tablet ) Daily., Disp: , Rfl:     RENVELA 800 mg Tab, Take 800 mg by mouth 2 (two) times daily., Disp: , Rfl:     tacrolimus (PROGRAF) 1 MG Cap, Take 2 capsules (2 mg total) by mouth every morning AND 1 capsule (1 mg total) every evening., Disp: 90 capsule, Rfl: 11    amLODIPine (NORVASC) 2.5 MG tablet, Take 2.5 mg by mouth once  daily. , Disp: , Rfl:     atorvastatin (LIPITOR) 40 MG tablet, Take 40 mg by mouth nightly., Disp: , Rfl:     cyproheptadine (PERIACTIN) 4 mg tablet, Take 4 mg by mouth 2 (two) times daily., Disp: , Rfl:     ferrous sulfate (FEOSOL) 325 mg (65 mg iron) Tab tablet, Take 1 tablet by mouth once daily., Disp: , Rfl:     gentamicin (GARAMYCIN) 0.1 % cream, SMARTSI Sparingly Topical Daily, Disp: , Rfl:     levETIRAcetam (KEPPRA) 500 MG Tab, Take 1 tablet (500 mg total) by mouth 2 (two) times daily., Disp: 60 tablet, Rfl: 1    magnesium oxide (MAG-OX) 400 mg (241.3 mg magnesium) tablet, Take 1 tablet (400 mg total) by mouth once daily., Disp: 30 tablet, Rfl: 1    pantoprazole (PROTONIX) 40 MG tablet, Take 40 mg by mouth once daily., Disp: , Rfl:     STAR-LAURY 0.8 mg Tab, Take 1 tablet by mouth once daily., Disp: , Rfl:     SODIUM BICARBONATE ORAL, Take 1,300 mg by mouth 2 (two) times a day. , Disp: , Rfl:   No current facility-administered medications for this visit.      Past Medical History:   Diagnosis Date    Allergy     Anticoagulant long-term use     Arthritis     Back pain     Cellulitis     Congenital heart disease     Hearing loss     right ear    Hepatic encephalopathy     Hypertension     diagnosed today (2015) and prescribed toprol    Leg edema     Nephrolithiasis     JACK (obstructive sleep apnea)     Seizures     per Pt last seizure - controlled wit medication    Splenomegaly        Past Surgical History:   Procedure Laterality Date    APPENDECTOMY      Open    BACK SURGERY      CHOLECYSTECTOMY      laprascopic    COLONOSCOPY N/A 2018    Procedure: COLONOSCOPY;  Surgeon: Lashawn Myles MD;  Location: [facility] ([address] [place] [address]);  Service: Endoscopy;  Laterality: N/A;    COLONOSCOPY N/A 2021    Procedure: COLONOSCOPY;  Surgeon: Jesus Grayson MD;  Location: [facility] ([address] [place] [address]);  Service: Endoscopy;  Laterality: N/A;  start with a pediatric colonoscope and might  "need the slim pediatric colonoscope available.   priority case per Dr. Grayson-labwork am of procedure  RAPID COVID test coming for > 3 hrs away/ prep ins. [email or web address] / Pt requesting specific date for travel purposes    LIVER TRANSPLANT N/A 7/23/2018    Procedure: TRANSPLANT, LIVER;  Surgeon: Alma Joyce MD;  Location: [facility] OR [address] [place] [address];  Service: Transplant;  Laterality: N/A;    LUMBAR DISCECTOMY      SPLENIC ARTERY EMBOLIZATION  04/08/2016    Dr Taveras    THORACENTESIS Left 8/3/2020    Procedure: Thoracentesis  U/S notified;  Surgeon: Augustine Rodriguez MD;  Location: [facility] ENDO;  Service: Pulmonary;  Laterality: Left;    THORACENTESIS Left 4/14/2021    Procedure: Thoracentesis;  Surgeon: Augustine Rodriguez MD;  Location: [facility] ENDO;  Service: Pulmonary;  Laterality: Left;    TONSILLECTOMY           Review of Systems   Constitutional: Negative for appetite change, chills, fatigue and fever.   HENT: Negative for trouble swallowing.    Eyes: Positive for visual disturbance.   Respiratory: Positive for shortness of breath. Negative for cough, chest tightness and wheezing.    Cardiovascular: Positive for leg swelling. Negative for chest pain and palpitations.   Gastrointestinal: Negative for abdominal pain, constipation, diarrhea and nausea.   Genitourinary: Negative for difficulty urinating, frequency and urgency.   Musculoskeletal: Positive for arthralgias. Negative for myalgias.   Skin: Negative for rash.   Neurological: Negative for dizziness, weakness, light-headedness and headaches.   Hematological: Bruises/bleeds easily.   Psychiatric/Behavioral: Negative for sleep disturbance.       Objective:   body mass index is 28.65 kg/m².   /73 (BP Location: Right arm, Patient Position: Sitting, BP Method: Large (Automatic))   Pulse 104   Temp 97.2 °F (36.2 °C) (Temporal)   Resp 16   Ht 6' 2" (1.88 m)   Wt 101.2 kg (223 lb 1.7 oz)   SpO2 95%   BMI 28.65 kg/m²       Physical " Exam  Constitutional:       General: He is not in acute distress.     Appearance: He is well-developed. He is not diaphoretic.   Cardiovascular:      Rate and Rhythm: Normal rate and regular rhythm.      Heart sounds: Normal heart sounds. No murmur heard.    No friction rub. No gallop.   Pulmonary:      Effort: Pulmonary effort is normal. No respiratory distress.      Breath sounds: Normal breath sounds. No wheezing or rales.   Abdominal:      General: Bowel sounds are normal. There is no distension.      Palpations: Abdomen is soft.      Tenderness: There is no abdominal tenderness.   Musculoskeletal:         General: No tenderness. Normal range of motion.   Skin:     General: Skin is warm and dry.      Findings: No rash.      Nails: There is no clubbing.   Neurological:      Mental Status: He is alert and oriented to person, place, and time.   Psychiatric:         Behavior: Behavior normal.         Labs:  Lab Results   Component Value Date    WBC 6.57 12/20/2021    HGB 9.9 (L) 12/20/2021    HCT 30.4 (L) 12/20/2021     03/03/2021    K 3.4 (L) 03/03/2021     03/03/2021    CO2 23 03/03/2021    BUN 47 (H) 03/03/2021    CREATININE 4.2 (H) 03/03/2021    EGFRNONAA 14.0 (A) 03/03/2021    CALCIUM 7.5 (L) 03/03/2021    PHOS 5.7 (H) 03/03/2021    MG 1.3 (L) 03/03/2021    ALBUMIN 3.0 (L) 03/03/2021    AST 9 (L) 03/03/2021    ALT <5 (L) 03/03/2021    UTPCR 11.57 (H) 01/08/2021    .6 (H) 06/03/2022    TACROLIMUS 4.7 (L) 03/03/2021       Lab Results   Component Value Date    PREALBUMIN 4 (L) 06/30/2018    BILIRUBINUA Negative 01/08/2021     (H) 07/30/2018    AMYLASE 30 07/23/2018    LIPASE 190 (H) 01/20/2018    PROTEINUA 3+ (A) 01/08/2021    NITRITE Negative 01/08/2021    RBCUA 8 (H) 01/08/2021    WBCUA 45 (H) 01/08/2021       No results found for: HLAABCTYPE    No results found for: CPRA, SS5HLUI, CIABCLM, CIIAB, ABCMT    Labs were reviewed with the patient.    Pre-transplant Workup:   Reviewed with  the patient.    Assessment:     1. CKD (chronic kidney disease) stage 4, GFR 15-29 ml/min    2. Patient on waiting list for kidney transplant    3. Essential hypertension    4. Coronary artery disease involving native coronary artery of native heart without angina pectoris    5. Seizures    6. Type 2 diabetes mellitus with stage 4 chronic kidney disease, without long-term current use of insulin    7. Status post liver transplant        Plan:     · There is abnormal septal wall motion most likely consistent with right bundle branch block--on echo not on previous ekg--needs to follow with his cardiologist  Obtain records from [facility] in [place] on hospitalization 4/2022-pleural effusion chest tube  Patient need f/u with pulmonologist     Remains inactive patient and family aware    Transplant Candidacy:   Mr. Adamson is an unacceptable kidney transplant candidate.  Meets center eligibility for accepting HCV+ donor offer - yes.  Patient educated on HCV+ donors. Alon is willing  to accept HCV+ donor offer -  yes   Patient is a candidate for KDPI > 85 kidney donor offer - yes.  He has experienced health changes that warrant further investigation.  Patient will be placed in an inactive status at present. Pleural effusion requiring chest tube in hospital pending pulmonary follow-up    Patient advised that it is recommended that all transplant candidates, and their close contacts and household members receive Covid vaccination.    Funmilayo Saravia NP       Follow-up:   In addition to the tests noted in the plan, Mr. Adamson will continue to have reevaluation as per the standing pre-kidney transplant protocol:  1. Monthly blood for PRA  2. Annual return to clinic, except HIV positive, > 65 years of age, or pancreas transplant candidates who will be scheduled to see transplant every 6 months while in pre-transplant phase  3. Annual re-testing: CXR, EKG, yearly mammograms for women over 40 and PSA for males over 40, cardiology  follow-up as recommended by initial cardiology pre-transplant evaluation  4. Renal ultrasound every 2 years  5. Baseline colonoscopy after age 50 and repeated as recommended    UNOS Patient Status  Functional Status: 90% - Able to carry on normal activity: minor symptoms of disease  Physical Capacity: No Limitations

## 2022-06-03 NOTE — LETTER
June 6, 2022        Bruno Scott  [address] [place] [address]  Phone: [phone]  Fax: [phone]     Sampson Jordan  [address] [place] [address]  Phone: [phone]  Fax: [phone]     Mckinley Vides  [address] [facility] [place] [address]  Phone: [phone]  Fax: [phone]             Josse Hwvinicius- Transplant [facility] Fl  [address]  [facility] [address]  Phone: [phone]   Patient: Alon Adamson   MR Number: [id number]   YOB: 1955   Date of Visit: 6/3/2022       Dear Dr. Bruno Scott, Mckinley Vides, Sampson Jordan    Thank you for referring Alon Adamson to me for evaluation. Attached you will find relevant portions of my assessment and plan of care.    If you have questions, please do not hesitate to call me. I look forward to following Alon Adamson along with you.    Sincerely,    Funmilayo Saravia, NP    Enclosure    If you would like to receive this communication electronically, please contact [email or web address] or [phone] to request [email or web address] access.    [employer] is a tool which provides read-only access to select patient information with whom you have a relationship. Its easy to use and provides real time access to review your patients record including encounter summaries, notes, results, and demographic information.    If you feel you have received this communication in error or would no longer like to receive these types of communications, please e-mail [email or web address]

## 2022-06-03 NOTE — TELEPHONE ENCOUNTER
----- Message from Jojo Cayla sent at 6/3/2022  3:08 PM CDT -----  Contact: Alon  Type:  Sooner Apoointment Request    Caller is requesting a sooner appointment.  Caller declined first available appointment listed below.  Caller will not accept being placed on the waitlist and is requesting a message be sent to doctor.  Name of Caller:Alon  When is the first available appointment?unknown  Symptoms:follow-up  Would the patient rather a call back or a response via [facility]? call  Best Call Back Number:[phone]   Additional Information: Patient request to schedule an appointment in July if possible. Please give patient a call back when possible.  Thank you,  GH

## 2022-06-06 NOTE — PROGRESS NOTES
Spoke w/pt regarding scheduling Cards & Pulm f/u for clearance. He needs cards f/u 2ry new finding, BBB and pulm f/u d/t recent hospitalization and underwent lung procedures. Funmilaoy encouraged them to schedule the follow up and we will get the records. Pt has agreed to schedule both consults locally and understands he will remain inactive on the txp WL.

## 2022-06-08 NOTE — PROGRESS NOTES
Pt reported he is currently hospitalized for surgery to eye, detached retina. He is also scheduled to follow-up with jori, Dr. Augustine Rodriguez at [facility] and will schedule cards f/u with Dr. Shiv Pan, [phone].

## 2022-06-13 NOTE — PROGRESS NOTES
Transplant Psychosocial Evaluation Update:  Last psychosocial evaluation for transplant was completed on 3/3/21 by Lisa Cerrato LCSW    Pt presents today in company of wife, Vin. Pt and wife  present as alert, oriented to person, place, time, purpose of visit. Pt and wife deny concerns about going through with transplant and state motivation and sense of preparedness for transplantation, caregiver role, psychosocial risk factors, medical risk factors, financial aspects, and lifetime commitments. All concerns and education points as per transplant psychosocial evaluation process addressed (also refer to psychosocial dated 3/12/2021). Pt actively participated in today's interview, with wife participating as caregiver. Pt asking questions appropriately and denies changes to previous psychosocial evaluation for transplantation which we reviewed line by line with pt and, per pt choice, with pts caregiver today.    Primary Caregivers and Transportation for Transplant:  1. Vin Adamson, wife, 58, [phone], drives  2. Onur Adamson, son, 19, [phone], drives.  3. Padmini Chavarria, 61, [place], [phone], drives    Both pt and caregiver/s plan to stay locally at [facility] - information reviewed in depth. Caregivers plan to stay at hospital as appropriate for transplant and post-transplant process.    Pts Vocation: Pt is disabled secondary to ESLD Last day worked:  6/30/16.    DME: [employer] equipment and supplies [facility] home unit     ADLS:  Pt states ability to independently accomplish all adls.    Household and Dependents: pt resides with wife and son and has no dependents at this time.    Income: Pt states ability to afford all monthly expenses and costs including for medications now and if transplanted based on income and on savings and assets.    Dialysis Adherence: See separate note for dialysis unit compliance report. Pt states current and expected compliance with all healthcare recommendations.    Pt and  wife deny any additional concerns, stating preparedness and motivation to proceed with organ transplant.     Suitability for Transplant:   Pt remains low risk for transplant at this time based on psychosocial risk factors and strengths.    Pt ralph well overall with health needs and life in general, has stable supports, adequate insurance, financial stability, transportation and caregiver plans in place, and is using no substances unless prescribed.

## 2022-07-05 NOTE — TELEPHONE ENCOUNTER
----- Message from Esteban Del Valle sent at 7/5/2022 11:23 AM CDT -----  Regarding: Speak with office  Contact: Alon Price is calling to speak with his coordinator Carol Ann      [phone]

## 2022-07-05 NOTE — TELEPHONE ENCOUNTER
Returned patient call, he asked when to have labs again; informed him next labs due 7/11/22. He voiced understanding.   He also is due for annual clinic visit with : appointment card will be given to .

## 2022-07-15 NOTE — TELEPHONE ENCOUNTER
Message received from patient /caretaker via [facility]:    Yes he continues to be on dialysis. Labs rechecked yesterday at dialysis clinic  have not gotten results.  Will have labs done next per dr Forrest order.

## 2022-07-15 NOTE — TELEPHONE ENCOUNTER
Patient notified and instructed via [facility]:    Per : Tacrolimus level seems higher than usual and potassium is also up.  Need to make sure patient is getting dialysis as this should be treating his potassium.  Please let me know otherwise he will need Kayexalate.  Repeat labs next week( 7/18/22).

## 2022-07-15 NOTE — TELEPHONE ENCOUNTER
----- Message from Miriam García MD sent at 7/14/2022  9:48 AM CDT -----  Tacrolimus level seems higher than usual and potassium is also up.  Need to make sure patient is getting dialysis as this should be treating his potassium.  Please let me know otherwise he will need Kayexalate.  Repeat labs next week.

## 2022-07-25 NOTE — PROGRESS NOTES
Transplant Hepatology  Liver Transplant Recipient Follow-up    Transplant Date: 7/23/2018  UNOS Native Liver Dx: Alcoholic Cirrhosis    Alon is here for follow up of his liver transplant.    ORGAN: LIVER  Whole or Partial: whole liver  Donor Type: donation after brain death  CDC High Risk: no  Donor CMV Status: Negative  Donor HCV Status: Negative  Donor HBcAb: Negative  Biliary Anastomosis: end to end  Arterial Anatomy: standard  IVC reconstruction: end to end ivc  Portal vein status: partially thrombosed  .    Subjective:     Interval History:  Currently, he is doing adequately. Current complaints include he started on peritoneal dialysis since last visit.  He reports he has been going well.  His son is concerned that his appetite is low.  Of note he has lost weight but it has only been about 10-15 lb.  He has been on Periactin but reports is not helping with his appetite.  He does take Nepro supplements.  It seems he stays in bed most of the day and is not active.    Review of Systems    Objective:     Physical Exam  Vitals reviewed.   Constitutional:       General: He is not in acute distress.     Appearance: He is well-developed.   HENT:      Head: Normocephalic and atraumatic.      Mouth/Throat:      Pharynx: No oropharyngeal exudate.   Eyes:      General: No scleral icterus.        Right eye: No discharge.         Left eye: No discharge.      Conjunctiva/sclera: Conjunctivae normal.      Pupils: Pupils are equal, round, and reactive to light.   Pulmonary:      Effort: Pulmonary effort is normal. No respiratory distress.      Breath sounds: Normal breath sounds. No wheezing.   Abdominal:      General: There is no distension.      Palpations: Abdomen is soft.      Tenderness: There is no abdominal tenderness.   Neurological:      Mental Status: He is alert and oriented to person, place, and time.   Psychiatric:         Behavior: Behavior normal.         WBC   Date Value Ref Range Status   12/20/2021 6.57 3.90  - 12.70 K/uL Final     Hemoglobin   Date Value Ref Range Status   12/20/2021 9.9 (L) 14.0 - 18.0 g/dL Final     POC Hematocrit   Date Value Ref Range Status   07/23/2018 25 (L) 36 - 54 %PCV Final     Hematocrit   Date Value Ref Range Status   12/20/2021 30.4 (L) 40.0 - 54.0 % Final     Platelets   Date Value Ref Range Status   12/20/2021 137 (L) 150 - 450 K/uL Final     BUN   Date Value Ref Range Status   03/03/2021 47 (H) 8 - 23 mg/dL Final     Creatinine   Date Value Ref Range Status   03/03/2021 4.2 (H) 0.5 - 1.4 mg/dL Final     Glucose   Date Value Ref Range Status   03/03/2021 123 (H) 70 - 110 mg/dL Final     Calcium   Date Value Ref Range Status   03/03/2021 7.5 (L) 8.7 - 10.5 mg/dL Final     Sodium   Date Value Ref Range Status   03/03/2021 140 136 - 145 mmol/L Final     Potassium   Date Value Ref Range Status   03/03/2021 3.4 (L) 3.5 - 5.1 mmol/L Final     Chloride   Date Value Ref Range Status   03/03/2021 104 95 - 110 mmol/L Final     Magnesium   Date Value Ref Range Status   03/03/2021 1.3 (L) 1.6 - 2.6 mg/dL Final     AST   Date Value Ref Range Status   03/03/2021 9 (L) 10 - 40 U/L Final     ALT   Date Value Ref Range Status   03/03/2021 <5 (L) 10 - 44 U/L Final     Alkaline Phosphatase   Date Value Ref Range Status   03/03/2021 74 55 - 135 U/L Final     Total Bilirubin   Date Value Ref Range Status   03/03/2021 0.4 0.1 - 1.0 mg/dL Final     Comment:     For infants and newborns, interpretation of results should be based  on gestational age, weight and in agreement with clinical  observations.    Premature Infant recommended reference ranges:  Up to 24 hours.............<8.0 mg/dL  Up to 48 hours............<12.0 mg/dL  3-5 days..................<15.0 mg/dL  6-29 days.................<15.0 mg/dL       Albumin   Date Value Ref Range Status   03/03/2021 3.0 (L) 3.5 - 5.2 g/dL Final     INR   Date Value Ref Range Status   12/20/2021 1.0 0.8 - 1.2 Final     Comment:     Coumadin Therapy:  2.0 - 3.0 for INR  for all indicators except mechanical heart valves  and antiphospholipid syndromes which should use 2.5 - 3.5.       Lab Results   Component Value Date    TACROLIMUS 4.7 (L) 03/03/2021           Assessment/Plan:     1. Immunosuppression    2. Liver transplanted    3. Sedentary lifestyle        Immunosuppression  -Continue current IS    Liver transplant-good allograft function  -Continue with routine lab monitoring  -Continue with PCP f/u  -Cancer screening- patient advised about increased risks of cancer and need for skin protection, skin exam and regular age appropriate cancer screening    Sedentary Lifestyle-strongly advised that he work on at least getting up and sitting in a chair for part of the day along with walking.  Advised that he slowly increase the amount of time that he is able to do these on a daily basis.    Return to clinic in 1 year    Patient was seen in the liver transplant department at [facility].    Miriam García MD           [facility] Patient Status  Functional Status: 60% - Requires occasional assistance but is able to care for needs  Physical Capacity: No Limitations

## 2022-07-26 NOTE — TELEPHONE ENCOUNTER
----- Message from Miriam García MD sent at 7/21/2022  5:26 PM CDT -----  Reviewed, nothing to do; repeat per routine

## 2022-08-01 NOTE — PROGRESS NOTES
Pulmonary Outpatient Follow Up Visit     Subjective:       Patient ID: Alon Adamson is a 67 y.o. male.    Chief Complaint: Pleural Effusion      HPI          65-year-old male patient presenting for 2 years follow-up    Last seen in office October 2020.    [facility] he had 2 thoracentesis both times the fluid was lymphocytic, looked more like a transudate negative for malignancy and negative microbiology.  Impression was suspected to be 3rd spacing.      April 2021, 200 cc of lynne sanguinous fluid was obtained.    August 2020,  400 of dark lynne clear fluid was recovered      He started January 2022, PD peritoneal dialysis .    April 2022 chest tube to the left it drained about 2 L Dx w ? Rx empyema.  The fluid chemistry and cell count and differential and microbiology were reviewed.  Patient says he was admitted to the hospital in [place] with sepsis finger.    The cell count was neutrophilic, 90% neutrophils, WBC 38 28.    LDH was elevated to 1800.    Glucose 36.  Total protein 2.2.      Has history of liver transplant 2018 for ETOH liver cirrhosis, on tacrolimus       Tobacco dip her quit 2018.     Used to work in the [employer] department in [place].      Review of Systems   Constitutional: Positive for weakness.   HENT: Negative for nosebleeds.    Eyes: Negative for redness.   Respiratory: Positive for shortness of breath. Negative for choking.    Cardiovascular: Positive for leg swelling. Negative for chest pain.   Genitourinary: Negative for hematuria.   Endocrine: ?   Diabetes mellitus Negative for cold intolerance.    Musculoskeletal: Positive for back pain and gait problem.   Skin: Positive for rash.   Gastrointestinal:        Cirrhosis status post liver transplant.   Neurological: Negative for syncope.        History of seizures   Hematological: Negative for adenopathy.        Immunocompromised.   Psychiatric/Behavioral: Negative for confusion.  "      Outpatient Encounter Medications as of 2022   Medication Sig Dispense Refill    aspirin (ECOTRIN) 81 MG EC tablet Take 81 mg by mouth once daily.       atorvastatin (LIPITOR) 40 MG tablet Take 40 mg by mouth nightly.      cyclobenzaprine (FLEXERIL) 5 MG tablet Take 5 mg by mouth 3 (three) times daily as needed.      furosemide (LASIX) 40 MG tablet Take 80 mg by mouth once daily. Take 80 mg (1 tablet ) Daily.      gentamicin (GARAMYCIN) 0.1 % cream SMARTSI Sparingly Topical Daily      ondansetron (ZOFRAN) 4 MG tablet TAKE 1 TABLET BY MOUTH EVERY 8 HOURS FOR 4 DAYS      oxyCODONE (ROXICODONE) 5 MG immediate release tablet oxycodone 5 mg tablet   TAKE ONE TABLET BY MOUTH EVERY TWELVE HOURS AS NEEDED FOR PAIN      STAR-LAURY 0.8 mg Tab Take 1 tablet by mouth once daily.      RENVELA 800 mg Tab Take 800 mg by mouth 2 (two) times daily.      tacrolimus (PROGRAF) 1 MG Cap Take 2 capsules (2 mg total) by mouth every morning AND 1 capsule (1 mg total) every evening. 90 capsule 11    amLODIPine (NORVASC) 2.5 MG tablet Take 2.5 mg by mouth once daily.       B complex-C-folic acid-Zn 500-400-15 mg-mcg-mg Tab Take by mouth.      SODIUM BICARBONATE ORAL Take 1,300 mg by mouth 2 (two) times a day.       sodium chloride 0.9% SolP 100 mL with iron sucrose 100 mg iron/5 mL Soln 100 mg Inject 200 mg into the vein.       No facility-administered encounter medications on file as of 2022.       Objective:     Vital Signs (Most Recent)  Vital Signs  Pulse: 102  Resp: 19  SpO2: 98 %  BP: 112/70  Height and Weight  Height: 6' 2" (188 cm)  Weight: 102.4 kg (225 lb 12 oz)  BSA (Calculated - sq m): 2.31 sq meters  BMI (Calculated): 29  Weight in (lb) to have BMI = 25: 194.3]  Wt Readings from Last 2 Encounters:   22 102.4 kg (225 lb 12 oz)   22 105.3 kg (232 lb 2.3 oz)       Physical Exam   Constitutional: He is oriented to person, place, and time. He appears well-developed and well-nourished.   NAHUN: "   Head: Normocephalic.   Cardiovascular: Normal rate, regular rhythm and normal heart sounds.   Pulmonary/Chest: Normal expansion and effort normal. No stridor. No respiratory distress. He exhibits no tenderness.   Breath sounds decreased on the left   Abdominal: Soft.   Musculoskeletal:         General: No tenderness or deformity.      Cervical back: Neck supple.   Lymphadenopathy:     He has no cervical adenopathy.   Neurological: He is alert and oriented to person, place, and time. Gait normal.   Skin: Skin is warm. No cyanosis. Nails show no clubbing.   Psychiatric: He has a normal mood and affect. His behavior is normal. Judgment and thought content normal.   Nursing note and vitals reviewed.      Laboratory  Lab Results   Component Value Date    WBC 6.57 12/20/2021    RBC 3.45 (L) 12/20/2021    HGB 9.9 (L) 12/20/2021    HCT 30.4 (L) 12/20/2021    MCV 88 12/20/2021    MCH 28.7 12/20/2021    MCHC 32.6 12/20/2021    RDW 13.9 12/20/2021     (L) 12/20/2021    MPV 9.4 12/20/2021    GRAN 5.0 12/20/2021    GRAN 75.3 (H) 12/20/2021    LYMPH 0.6 (L) 12/20/2021    LYMPH 9.3 (L) 12/20/2021    MONO 0.7 12/20/2021    MONO 10.8 12/20/2021    EOS 0.2 12/20/2021    BASO 0.04 12/20/2021    EOSINOPHIL 2.9 12/20/2021    BASOPHIL 0.6 12/20/2021       BMP  Lab Results   Component Value Date     03/03/2021    K 3.4 (L) 03/03/2021     03/03/2021    CO2 23 03/03/2021    BUN 47 (H) 03/03/2021    CREATININE 4.2 (H) 03/03/2021    CALCIUM 7.5 (L) 03/03/2021    ANIONGAP 13 03/03/2021    ESTGFRAFRICA 16.1 (A) 03/03/2021    EGFRNONAA 14.0 (A) 03/03/2021    AST 9 (L) 03/03/2021    ALT <5 (L) 03/03/2021    PROT 7.0 03/03/2021       Lab Results   Component Value Date    BNP 96 07/29/2020    BNP 88 01/20/2018       Lab Results   Component Value Date    TSH 5.556 (H) 08/17/2018       No results found for: SEDRATE    No results found for: CRP  No results found for: IGE     No results found for: ASPERGILLUS  No results found for:  AFUMIGATUSCL     No results found for: ACE      Latest Reference Range & Units 08/03/20 13:48 04/14/21 12:55 04/14/21 12:56   Fluid Color  Yellow  Red   Fluid Appearance  Clear  Hazy   WBC, Body Fluid /cu mm 46  34   Body Fluid Type  Pleural Fluid, Left  Thoracentesis Fluid   Segs, Fluid % 1  3   Lymphs, Fluid % 89  74   Monocytes/Macrophages, Fluid % 10  23   Glucose, Fluid Not established mg/dL 72 91    LD, Fluid Not established U/L 250 150    Body Fluid, Albumin See text g/dL  1.0    Body Fluid Source, Albumin   Thoracentesis Fluid    Body Fluid Source, Glucose  Pleural Fluid, Left Thoracentesis Fluid    Body Fluid Source, LDH  Pleural Fluid, Left Thoracentesis Fluid    Body Fluid Source, Total Protein  Pleural Fluid, Left Thoracentesis Fluid    Body Fluid, Protein Not established g/dL 2.3 1.6        Pleural fluid pathology negative for malignancy.      Diagnostic Results:  I have personally reviewed today the following studies:        Chest x-ray 07/15/2020     There is a large opacities seen within the left lung base that appears to be contiguous with the pleural space along the medial and lateral aspect of the left hemithorax and near the left lung apex.  Findings are consistent with a large left-sided pleural effusion that is primarily subpulmonic in location.  The heart is not enlarged. There appear to be some vascular coils projecting over the upper abdomen best seen on the lateral film.        2D echo 2018     1 - Normal left ventricular systolic function (EF 55-60%).     2 - Normal left ventricular diastolic function.     3 - Normal right ventricular systolic function .     4 - Trivial mitral regurgitation.     5 - Trivial tricuspid regurgitation.     6 - IVC not well seen. However, most likely based on other findings central venous pressures are not elevated.         CT CHEST 4/2022    1.  Decreased size of the left empyema with pigtail catheter terminating within it. Air-fluid level, presumably due to  catheter placement.   2.  Overlying lung consolidation with few small foci of cavitation, similar to prior.   3.  Fluid about the gastric cardia and small perihepatic ascites, similar to prior.       Assessment/Plan:   Pleural effusion on left  -     RHEUMATOID FACTOR; Future; Expected date: 08/01/2022  -     PATTY Screen w/Reflex; Future; Expected date: 08/01/2022  -     T-SPOT TB Screening Test; Future; Expected date: 08/01/2022  -     PROTEIN ELECTROPHORESIS, SERUM; Future; Expected date: 08/01/2022  -     Ambulatory referral/consult to Cardiothoracic Surgery; Future; Expected date: 08/08/2022  -     Cancel: CT Chest Without Contrast; Future; Expected date: 08/01/2022  -     CT Chest Abdoment Pelvis Without Contrast (XPD); Future; Expected date: 08/01/2022    S/P thoracentesis    Liver replaced by transplant    Peritoneal dialysis status    Immunosuppression         In summary patient had a recurrent left-sided pleural effusion drained in 2020 and then in 2021 and lately in April 2022 he was admitted in [place] with suspicion diagnosis of empyema and chest tube placement.  LDH elevated glucose decreased but microbiology negative and WBC 3800 only in pleural fluid    His pleural fluid initially was lymphocytic transudative however in April 2022 looked exudate they have suspicion of a complicated effusion/empyema.    He is immunocompromised on tacrolimus for liver transplant due to ETOH use, he is on peritoneal does for CKD stage 5 and being evaluated for kidney transplant.    At the moment his pleural effusion remains of unclear/uncertain etiology.    Might benefit from pleural biopsy/VATS.  I discussed with patient nature of procedure InCase cardiothoracic surgery consider is this plan.    I discussed his case today with cardiothoracic surgeon .    Will repeat CT chest abdomen pelvis rule out occult malignancy.  Check T spot SPEP, check rheumatoid factor PATTY.    Further recommendation to follow above  workup.    Follow up in about 1 month (around 9/1/2022).    This note was prepared using voice recognition system and is likely to have sound alike errors that may have been overlooked even after proof reading.  Please call me with any questions    Discussed diagnosis, its evaluation, treatment and usual course. All questions answered.      Augustine Rodriguez MD

## 2022-08-05 NOTE — TELEPHONE ENCOUNTER
Spoke to patient in reference to Hematology referral from Dr. Rodriguez.  Appointment scheduled per patient's request next available.  Appointment notice via [employer].

## 2022-08-05 NOTE — TELEPHONE ENCOUNTER
----- Message from Liliana Whitehead sent at 8/5/2022  1:21 PM CDT -----  Contact: pt  Type:  Patient Returning Call    Who Called: pt  Who Left Message for Patient: unknown   Does the patient know what this is regarding? no  Would the patient rather a call back or a response via [employer]? Call back  Best Call Back Number: [phone]  Additional Information: n/a

## 2022-08-05 NOTE — TELEPHONE ENCOUNTER
----- Message from Chelo Blas sent at 8/5/2022 12:18 PM CDT -----  Contact: Alon Price is requesting a call to push the time of the appt back. Please call him back at [phone]

## 2022-08-05 NOTE — PROGRESS NOTES
The patient location is: Home    Visit type: audiovisual    Face to Face time with patient: 20  45 minutes of total time spent on the encounter, which includes face to face time and non-face to face time preparing to see the patient (eg, review of tests), Obtaining and/or reviewing separately obtained history, Documenting clinical information in the electronic or other health record, Independently interpreting results (not separately reported) and communicating results to the patient/family/caregiver, or Care coordination (not separately reported).     Each patient to whom he or she provides medical services by telemedicine is:  (1) informed of the relationship between the physician and patient and the respective role of any other health care provider with respect to management of the patient; and (2) notified that he or she may decline to receive medical services by telemedicine and may withdraw from such care at any time.    Patient ID: Alon Adamson is a 67 y.o. male.    Chief Complaint: lab abnormality    HPI:  Patient is a 67 year old male who presents today as a new patient telemedicine visit for further evaluation and management of abnormal SPEP.  He has been referred to the hematology clinic by his pulmonologist Dr. DARBY Rodriguez who is following patient for h/o recurrent pleural effusions requiring chest tube placement and thoracentesis (last 4/2021)of transudate fluids negative for malignancy.  Suspected due to third spacing.  He is s/p liver transplant in 2018 due to ETOH liver cirrhosis and is currently on tacrolimus.  He is also on PD that was started on 1/2022     Social History     Socioeconomic History    Marital status:      Spouse name: Vin Adamson    Number of children: 1   Tobacco Use    Smoking status: Never Smoker    Smokeless tobacco: Former User     Types: Chew     Quit date: 7/29/2018   Substance and Sexual Activity    Alcohol use: No     Alcohol/week: 0.0 standard drinks     Drug use: No    Sexual activity: Not Currently   Social History Narrative    Caregiver Wife/Son       Family History   Problem Relation Age of Onset    Melanoma Mother     Heart attack Neg Hx     Heart disease Neg Hx     Hypertension Neg Hx        Past Surgical History:   Procedure Laterality Date    APPENDECTOMY      Open    BACK SURGERY      CHOLECYSTECTOMY      laprascopic    COLONOSCOPY N/A 1/25/2018    Procedure: COLONOSCOPY;  Surgeon: Lashawn Myles MD;  Location: [address]);  Service: Endoscopy;  Laterality: N/A;    COLONOSCOPY N/A 12/20/2021    Procedure: COLONOSCOPY;  Surgeon: Jesus Grayson MD;  Location: [facility] ([address] [place] [address]);  Service: Endoscopy;  Laterality: N/A;  start with a pediatric colonoscope and might need the slim pediatric colonoscope available.   priority case per Dr. Grayson-labwork am of procedure  RAPID COVID test coming for > 3 hrs away/ prep ins. [email or web address] / Pt requesting specific date for travel purposes    LIVER TRANSPLANT N/A 7/23/2018    Procedure: TRANSPLANT, LIVER;  Surgeon: Alma Joyce MD;  Location: [address];  Service: Transplant;  Laterality: N/A;    LUMBAR DISCECTOMY      SPLENIC ARTERY EMBOLIZATION  04/08/2016    Dr Taveras    THORACENTESIS Left 8/3/2020    Procedure: Thoracentesis  U/S notified;  Surgeon: Augsutine Rodriguez MD;  Location: [facility];  Service: Pulmonary;  Laterality: Left;    THORACENTESIS Left 4/14/2021    Procedure: Thoracentesis;  Surgeon: Augustine Rodriguez MD;  Location: [facility];  Service: Pulmonary;  Laterality: Left;    TONSILLECTOMY         Past Medical History:   Diagnosis Date    Allergy     Anticoagulant long-term use     Arthritis     Back pain     Cellulitis     Congenital heart disease     Hearing loss     right ear    Hepatic encephalopathy     Hypertension     diagnosed today (11/25/2015) and prescribed toprol    Leg edema     Nephrolithiasis     JACK (obstructive sleep apnea)      Seizures     per Pt last seizure 2018- controlled wit medication    Splenomegaly        Review of Systems   Constitutional: Positive for fatigue.   HENT: Negative.    Eyes: Negative.    Respiratory: Negative.    Cardiovascular: Negative.    Gastrointestinal: Negative.    Endocrine: Negative.    Genitourinary: Negative.    Musculoskeletal: Negative.    Skin: Negative.    Allergic/Immunologic: Negative.    Neurological: Negative.    Hematological: Negative.    Psychiatric/Behavioral: Negative.           Medication List with Changes/Refills   Current Medications    ASPIRIN (ECOTRIN) 81 MG EC TABLET    Take 81 mg by mouth once daily.     ATORVASTATIN (LIPITOR) 40 MG TABLET    Take 40 mg by mouth nightly.    CYCLOBENZAPRINE (FLEXERIL) 5 MG TABLET    Take 5 mg by mouth 3 (three) times daily as needed.    FUROSEMIDE (LASIX) 40 MG TABLET    Take 80 mg by mouth once daily. Take 80 mg (1 tablet ) Daily.    GENTAMICIN (GARAMYCIN) 0.1 % CREAM    SMARTSI Sparingly Topical Daily    ONDANSETRON (ZOFRAN) 4 MG TABLET    TAKE 1 TABLET BY MOUTH EVERY 8 HOURS FOR 4 DAYS    OXYCODONE (ROXICODONE) 5 MG IMMEDIATE RELEASE TABLET    oxycodone 5 mg tablet   TAKE ONE TABLET BY MOUTH EVERY TWELVE HOURS AS NEEDED FOR PAIN    STAR-LAURY 0.8 MG TAB    Take 1 tablet by mouth once daily.    RENVELA 800 MG TAB    Take 800 mg by mouth 2 (two) times daily.    TACROLIMUS (PROGRAF) 1 MG CAP    Take 2 capsules (2 mg total) by mouth every morning AND 1 capsule (1 mg total) every evening.        Objective:   There were no vitals filed for this visit.    Physical Exam  Constitutional:       Appearance: Normal appearance.   Pulmonary:      Effort: Pulmonary effort is normal.   Neurological:      General: No focal deficit present.      Mental Status: He is alert and oriented to person, place, and time.   Psychiatric:         Mood and Affect: Mood normal.         Behavior: Behavior normal.         Thought Content: Thought content normal.          Judgment: Judgment normal.         Assessment:     Problem List Items Addressed This Visit        GI    Status post liver transplant      Other Visit Diagnoses     Anemia due to chronic kidney disease, on chronic dialysis    -  Primary    Relevant Orders    Immunoglobulin Free LT Chains Blood    Pleural effusion on left        Abnormal SPEP        Relevant Orders    Immunoglobulin Free LT Chains Blood          Lab Results   Component Value Date    WBC 6.57 12/20/2021    RBC 3.45 (L) 12/20/2021    HGB 9.9 (L) 12/20/2021    HCT 30.4 (L) 12/20/2021    MCV 88 12/20/2021    MCH 28.7 12/20/2021    MCHC 32.6 12/20/2021    RDW 13.9 12/20/2021     (L) 12/20/2021    MPV 9.4 12/20/2021    GRAN 5.0 12/20/2021    GRAN 75.3 (H) 12/20/2021    LYMPH 0.6 (L) 12/20/2021    LYMPH 9.3 (L) 12/20/2021    MONO 0.7 12/20/2021    MONO 10.8 12/20/2021    EOS 0.2 12/20/2021    BASO 0.04 12/20/2021    EOSINOPHIL 2.9 12/20/2021    BASOPHIL 0.6 12/20/2021      Lab Results   Component Value Date     03/03/2021    K 3.4 (L) 03/03/2021     03/03/2021    CO2 23 03/03/2021    BUN 47 (H) 03/03/2021    CREATININE 4.2 (H) 03/03/2021    CALCIUM 7.5 (L) 03/03/2021    ANIONGAP 13 03/03/2021    ESTGFRAFRICA 16.1 (A) 03/03/2021    EGFRNONAA 14.0 (A) 03/03/2021     Lab Results   Component Value Date    ALT <5 (L) 03/03/2021    AST 9 (L) 03/03/2021     (H) 07/30/2018    ALKPHOS 74 03/03/2021    BILITOT 0.4 03/03/2021     Pathologist Interpretation SPE REVIEWED    Comment:    Electronically reviewed and signed by:   Mark Yang M.D.   Signed on 08/03/22 at 12:27   Normal total protein.   No monoclonal peaks identified.        Plan:   Anemia due to chronic kidney disease, on chronic dialysis  -     Immunoglobulin Free LT Chains Blood; Future; Expected date: 08/05/2022    Pleural effusion on left  -     Ambulatory referral/consult to Hematology / Oncology    Abnormal SPEP  -     Ambulatory referral/consult to Hematology / Oncology  -      Immunoglobulin Free LT Chains Blood; Future; Expected date: 08/05/2022    Status post liver transplant    Reviewed abnormal SPEP with Path interpretation showing no monoclonal protein which is not indicative of myeloma. I will check FLC to determine if FLC myeloma could be causing kidney dysfunction and thus possible recurrent pleural effusions.  Patient states previous kidney biopsy done was OK and kidney doctor thought that his renal failure was due to hypertension.  Will have patient have FLC lab done at next MD visit and will communicate results via portal.  Discussed that polyclonal elevation of FLCs is not indicative of myeloma.  Will schedule f/u based on results as needed.      Collaborating Provider:  Dr. Cristofer Sosa    Thank You,  Yani Smith, FNP-C  Hematology Oncology

## 2022-08-23 PROBLEM — G47.33 OSA (OBSTRUCTIVE SLEEP APNEA): Status: ACTIVE | Noted: 2022-01-01

## 2022-08-23 PROBLEM — N18.6 ESRD ON PERITONEAL DIALYSIS: Status: ACTIVE | Noted: 2022-01-01

## 2022-08-23 PROBLEM — E86.1 HYPOTENSION DUE TO HYPOVOLEMIA: Status: ACTIVE | Noted: 2022-01-01

## 2022-08-23 PROBLEM — K92.2 GI BLEED: Status: ACTIVE | Noted: 2022-01-01

## 2022-08-23 PROBLEM — E11.9 DM2 (DIABETES MELLITUS, TYPE 2): Status: ACTIVE | Noted: 2021-03-03

## 2022-08-23 PROBLEM — Z99.2 ESRD ON PERITONEAL DIALYSIS: Status: ACTIVE | Noted: 2022-01-01

## 2022-08-23 NOTE — ASSESSMENT & PLAN NOTE
Sec to Acute Blood Loss and possibly on going bleeding, s/p 2 units of blood and Octreotide,  May need Levophed gtt, more blood

## 2022-08-23 NOTE — HPI
67-year-old male with hx of alcoholic cirrhosis s/p liver transplant in 2018 (on tacrolimus), CAD, HTN, DM, thrombocytopenia, recurrent/persistent left pleural effusion (s/p prior thoracenteses), ESRD on PD, anemia (hemoglobin 9.1 in 4/ 2022), and JACK presented to [facility] in [place] on the morning of August 23 with hematemesis. He is on ASA but no other anticoagulation. Episodes of hematemesis started the day prior, and he was seen at a different emergency department. Patient said he was told he had diverticulitis and was sent home on oral antibiotics. He noted having additional hematemesis this morning. He had 1 episode of dark bloody emesis in the ER. He is awake and alert with no alteration in mentation. Initial SBP were in the 70s, and he received a small bolus of NS and was started on 2 units of blood. He also was given 80 mg Protonix and started on Protonix infusion. Octreotide initiated and then d/mumtaz as BP improved with pRBCs. He was transferred to [facility] ER for GI/EGD, accepted by Dr. Houser.  Initial labs COVID negative, INR 1.03, H/H 8/23, platelets 250. Repeat labs here Hgb 9.3, Plt 182. He is admitted to ICU under \[facility]\"" for EGD and may need Levophed gtt for BP support.

## 2022-08-23 NOTE — ASSESSMENT & PLAN NOTE
Hx of Liver Cirrhosis with Esophageal varices in the past s/p Liver Tx in 2018  S/p IVF, Protonix Gtt plus Octreotide gtt  Admitted to ICU, await EGD

## 2022-08-23 NOTE — HPI
68 y/o male with alcohol cirrhpsos s/p OLTx 07/2018 at [facility] on immunosuppression with Tacrolimus monotherapy and on peritoneal dialysis. He presented to [facility] this morning with complaints of hematemesis. Symptoms began yesterday however hematemesis occurred one time. There was associated presyncopal symptoms therefore he sought medical attention at [facility]. A CT scan was performed and was told no concerning findings. He was given fluids and discharged home. Upon arrival to the ED, he was noted to have evidence of blood in the mouth. Vitals showed he was hypotensive at 71/44. He was bolused fluid and BP improved to 821/51. Labs showed a H&H of 8.1/22.7. His baseline H&H from 12/2021 was 9.9/30.5. He was transfused 2U pRBCs. He was also given Protonix bolus with gtt and Octreotide bolus with gtt. Repeat H&H upon arrival to [facility] ED is 9.3/27.5.    Patient is seen on arrival. He is lucid and communicating. Denies abdominal pain. No NSAIDs. Last EGD pre-transplant in 01/2018. Small varices were appreciated in the esophagus, normal stomach and duodenum. Denies etoh use.

## 2022-08-23 NOTE — BRIEF OP NOTE
Inpatient Endoscopy Brief Operative Note      Admit Date: 8/23/2022    Procedure Date and Time:  8/23/2022 5:49 PM    Attending Physician: Ector Nelson MD     Principal Admitting Diagnoses: GI bleed, hematemesis         Discharge Diagnosis: The primary encounter diagnosis was Upper GI bleed. Diagnoses of GI bleed, Acute blood loss anemia, and H/O liver transplant were also pertinent to this visit.     Discharged Condition: Stable    Indication for Admission: <principal problem not specified>     Procedure Performed: EGD with control of bleeding    SEE [employer]ATIONS REPORTS FOR DETAILS.    Pathology (if any):  Specimen (24h ago, onward)            None          Estimated Blood Loss: 20-30 ml.    Discussed with: patient and family.    Disposition: Return to [facility].    Recommendations:   1. Clear liquids  2. Continue Protonix gtt  3. IR consult if patient rebleeds      Communicated with ICU and hospital medicine services regarding the above findings.       Nora Houser MD  Inpatient Gastroenterology  [facility]

## 2022-08-23 NOTE — H&P (VIEW-ONLY)
[facility] - Intensive Care ([facility])  Gastroenterology  Consult Note    Patient Name: Alon Adamson  MRN: [id number]  Admission Date: 8/23/2022  Hospital Length of Stay: 0 days  Code Status: Full Code   Attending Provider: Ector Nelson MD   Consulting Provider: Nora Houser MD  Primary Care Physician: Bruno Oconnor NP  Principal Problem:<principal problem not specified>    Inpatient consult to Gastroenterology  Consult performed by: Nora Houser MD  Consult ordered by: Jim Marrufo Jr., MD  Reason for consult: hematemesis, GI bleed        Subjective:     HPI:  68 y/o male with alcohol cirrhpsos s/p OLTx 07/2018 at [facility] on immunosuppression with Tacrolimus monotherapy and on peritoneal dialysis. He presented to [facility] this morning with complaints of hematemesis. Symptoms began yesterday however hematemesis occurred one time. There was associated presyncopal symptoms therefore he sought medical attention at [facility]. A CT scan was performed and was told no concerning findings. He was given fluids and discharged home. Upon arrival to the ED, he was noted to have evidence of blood in the mouth. Vitals showed he was hypotensive at 71/44. He was bolused fluid and BP improved to 821/51. Labs showed a H&H of 8.1/22.7. His baseline H&H from 12/2021 was 9.9/30.5. He was transfused 2U pRBCs. He was also given Protonix bolus with gtt and Octreotide bolus with gtt. Repeat H&H upon arrival to [facility] ED is 9.3/27.5.    Patient is seen on arrival. He is lucid and communicating. Denies abdominal pain. No NSAIDs. Last EGD pre-transplant in 01/2018. Small varices were appreciated in the esophagus, normal stomach and duodenum. Denies etoh use.           Past Medical History:   Diagnosis Date    Allergy     Anticoagulant long-term use     Arthritis     Back pain     Cellulitis     Congenital heart disease     Hearing loss     right ear    Hepatic  encephalopathy     HTN (hypertension)     Hypertension     diagnosed today (11/25/2015) and prescribed toprol    Leg edema     Nephrolithiasis     JACK (obstructive sleep apnea)     JACK (obstructive sleep apnea)     Seizures     per Pt last seizure 2018- controlled wit medication    Splenomegaly        Past Surgical History:   Procedure Laterality Date    APPENDECTOMY      Open    BACK SURGERY      CHOLECYSTECTOMY      laprascopic    COLONOSCOPY N/A 1/25/2018    Procedure: COLONOSCOPY;  Surgeon: Lashawn Myles MD;  [facility]  Service: Endoscopy;  Laterality: N/A;    COLONOSCOPY N/A 12/20/2021    Procedure: COLONOSCOPY;  Surgeon: Jesus Grayson MD;  [facility]  Service: Endoscopy;  Laterality: N/A;  start with a pediatric colonoscope and might need the slim pediatric colonoscope available.   priority case per Dr. Grayson-labwork am of procedure  RAPID COVID test coming for > 3 hrs away/ prep ins. [email or web address] / Pt requesting specific date for travel purposes    LIVER TRANSPLANT N/A 7/23/2018    Procedure: TRANSPLANT, LIVER;  Surgeon: Alma Joyce MD;  [facility];  Service: Transplant;  Laterality: N/A;    LUMBAR DISCECTOMY      SPLENIC ARTERY EMBOLIZATION  04/08/2016    Dr Taveras    THORACENTESIS Left 8/3/2020    Procedure: Thoracentesis  U/S notified;  Surgeon: Augustine Rodriguez MD;  Location: [facility];  Service: Pulmonary;  Laterality: Left;    THORACENTESIS Left 4/14/2021    Procedure: Thoracentesis;  Surgeon: Augustine Rodriguez MD;  Location: [facility];  Service: Pulmonary;  Laterality: Left;    TONSILLECTOMY         Review of patient's allergies indicates:   Allergen Reactions    Nsaids (non-steroidal anti-inflammatory drug)      D/t to liver disease.  Other reaction(s): Unknown    Penicillins Other (See Comments)     Tolerated pip-tazo in 8/2016 without issue  Tolerated cefepime 7/2018 without issue  Since  childhood was told not to take it  Other reaction(s): Unknown     Family History       Problem Relation (Age of Onset)    Melanoma Mother          Tobacco Use    Smoking status: Never Smoker    Smokeless tobacco: Former User     Types: Chew     Quit date: 7/29/2018   Substance and Sexual Activity    Alcohol use: No     Alcohol/week: 0.0 standard drinks    Drug use: No    Sexual activity: Not Currently     Review of Systems   Constitutional:  Positive for fatigue.   Gastrointestinal:  Positive for vomiting.   Skin:  Positive for color change.   Neurological:  Positive for weakness and light-headedness.   All other systems reviewed and are negative.  Objective:     Vital Signs (Most Recent):  Temp: 97.6 °F (36.4 °C) (08/23/22 1144)  Pulse: 98 (08/23/22 1446)  Resp: (!) 22 (08/23/22 1446)  BP: (!) 98/57 (08/23/22 1446)  SpO2: 98 % (08/23/22 1446)   Vital Signs (24h Range):  Temp:  [97.6 °F (36.4 °C)] 97.6 °F (36.4 °C)  Pulse:  [] 98  Resp:  [18-22] 22  SpO2:  [98 %-100 %] 98 %  BP: ()/(56-60) 98/57     Weight: 85.3 kg (188 lb 0.8 oz) (08/23/22 1425)  Body mass index is 24.13 kg/m².    No intake or output data in the 24 hours ending 08/23/22 1508    Lines/Drains/Airways       Peripheral Intravenous Line  Duration                  Peripheral IV - Single Lumen 08/23/22 0000 20 G Distal;Left;Posterior Wrist <1 day         Peripheral IV - Single Lumen 08/23/22 0000 20 G Left;Posterior Hand <1 day         Peripheral IV - Single Lumen 08/23/22 1239 18 G Right Antecubital <1 day                    Physical Exam  Constitutional:       Appearance: He is obese. He is ill-appearing.      Comments: Appears older than stated age   Cardiovascular:      Rate and Rhythm: Regular rhythm. Tachycardia present.      Heart sounds: Normal heart sounds.      Comments: Bilateral brawning of the lower extremities with excessive dry skin.   Pulmonary:      Effort: Pulmonary effort is normal.      Breath sounds: Normal breath  sounds.   Abdominal:      General: Abdomen is protuberant. Bowel sounds are normal.      Palpations: Abdomen is soft.      Tenderness: There is no abdominal tenderness.   Musculoskeletal:        Legs:       Comments: Bilteral brawning of the lower extremities. Bilateral onychomycosis of all toe nails.    Skin:     General: Skin is warm and dry.      Coloration: Skin is pale.      Comments: Excessive dry skin of the lower extremities.    Neurological:      General: No focal deficit present.      Mental Status: He is alert and oriented to person, place, and time. Mental status is at baseline.   Psychiatric:         Attention and Perception: Attention normal.         Mood and Affect: Mood normal.         Speech: Speech normal.         Behavior: Behavior normal. Behavior is cooperative.         Thought Content: Thought content normal.         Cognition and Memory: Cognition normal.         Judgment: Judgment normal.       Significant Labs:  CBC:   Recent Labs   Lab 08/23/22  1239   WBC 11.46   HGB 9.3*   HCT 27.5*        CMP:   Recent Labs   Lab 08/23/22  1239   *   CALCIUM 9.0   ALBUMIN 2.6*   PROT 6.1   *   K 4.0   CO2 22*   CL 96   *   CREATININE 6.3*   ALKPHOS 47*   ALT 10   AST 9*   BILITOT 0.6     Coagulation:   Recent Labs   Lab 08/23/22  1239   INR 1.0       Significant Imaging:  Imaging results within the past 24 hours have been reviewed.    Assessment/Plan:     Acute upper GI bleeding  - hypotensive on presentation to local ED  - resuscitated with blood and fluids  - NPO since yesterday  - Protonix gtt and Octreotide gtts administered  - H&H post transfusion 9.3/31  - Last EGD pre-transplant   - no NSAIDs  - ASA last taken yesterday morning.    Liver transplanted  - OLTx 07/2018 for alcohol cirrhosis  - no alcohol for decades  - tacrolimus monotherapy      ESRD on peritoneal dialysis  - attempted peritoneal dialysis before onset of hematemesis this morning but did not  complete.    Recommendations:  1. EGD in ICU   2. Agree with resuscitation efforts  3. Agree with Protonix gtt  4. Unlikely he has varices post transplant, discontinue Octreotide gtt  5. Prograf level    The risks, benefits and alternatives of the procedure were discussed with the patient in detail. This discussion was had in the presence of ED nursing staff. The risks include, risks of adverse reaction to sedation requiring the use of reversal agents, bleeding requiring blood transfusion, perforation requiring surgical intervention and technical failure. Other risks include aspiration leading to respiratory distress and respiratory failure resulting in endotracheal intubation and mechanical ventilation including death. If anesthesia is being utilized for this procedure, it is up to the anesthesiologist to determine airway safety including elective endotracheal intubation. Questions were answered, they agree to proceed. There was no language barriers.     Thank you for your consult. I will follow-up with patient. Please contact us if you have any additional questions.    Nora Houser MD  Gastroenterology  [facility] - Intensive Care ([facility])

## 2022-08-23 NOTE — ASSESSMENT & PLAN NOTE
- hypotensive on presentation to local ED  - resuscitated with blood and fluids  - NPO since yesterday  - Protonix gtt and Octreotide gtts administered  - H&H post transfusion 9.3/31  - Last EGD pre-transplant   - no NSAIDs  - ASA last taken yesterday morning.

## 2022-08-23 NOTE — TRANSFER OF CARE
"Anesthesia Transfer of Care Note    Patient: Alon Adamson    Procedure(s) Performed: Procedure(s) (LRB):  EGD (ESOPHAGOGASTRODUODENOSCOPY) (N/A)    Patient location: ICU    Anesthesia Type: MAC    Transport from OR: Transported from OR on room air with adequate spontaneous ventilation    Post pain: adequate analgesia    Post assessment: no apparent anesthetic complications    Post vital signs: stable    Level of consciousness: awake and responds to stimulation    Nausea/Vomiting: no nausea/vomiting    Complications: none    Transfer of care protocol was followed      Last vitals:   Visit Vitals  BP (!) 101/57   Pulse 101   Temp 36.7 °C (98 °F)   Resp (!) 21   Ht 6' 2.02" (1.88 m)   Wt 85.3 kg (188 lb 0.8 oz)   SpO2 99%   BMI 24.13 kg/m²     "

## 2022-08-23 NOTE — ED PROVIDER NOTES
SCRIBE #1 NOTE: IAnuj, elias scribing for, and in the presence of, Jim Marrufo Jr., MD. I have scribed the entire note.      History      Chief Complaint   Patient presents with    Hematemesis     Transfer from [facility] for GI services of hematemesis that began approx 0600. Pt hx of liver transplant and PD at night. Also sent for hypotension. PT AAO x3, denies pain.        Review of patient's allergies indicates:   Allergen Reactions    Nsaids (non-steroidal anti-inflammatory drug)      D/t to liver disease.  Other reaction(s): Unknown    Penicillins Other (See Comments)     Tolerated pip-tazo in 8/2016 without issue  Tolerated cefepime 7/2018 without issue  Since childhood was told not to take it  Other reaction(s): Unknown        HPI   HPI    8/23/2022, 11:50 AM   History obtained from the patient and EMS      History of Present Illness: Alon Adamson is a 67 y.o. male patient with a PMHx of HTN, ESRD and an SHx of liver txp in 2018 who presents to the Emergency Department for hematemesis, onset this morning. Pt was transferred to the ED from [facility] for Gastroenterology services for possible GI bleed. Pt is currently on peritoneal dialysis. Symptoms are episodic and moderate in severity. No mitigating or exacerbating factors reported. Associated sxs include nausea. Patient denies any fever, chills, SOB, CP, abdominal pain, blood in stool, weakness, numbness, dizziness, headache, and all other sxs at this time. Pt was transfused 2 units of blood products prior to transfer. No further complaints or concerns at this time.     Arrival mode: EMS    PCP: Bruno Oconnor NP       Past Medical History:  Past Medical History:   Diagnosis Date    Allergy     Anticoagulant long-term use     Arthritis     Back pain     Cellulitis     Congenital heart disease     Hearing loss     right ear    Hepatic encephalopathy     HTN (hypertension)      Hypertension     diagnosed today (11/25/2015) and prescribed toprol    Leg edema     Nephrolithiasis     JACK (obstructive sleep apnea)     JACK (obstructive sleep apnea)     Seizures     per Pt last seizure 2018- controlled wit medication    Splenomegaly        Past Surgical History:  Past Surgical History:   Procedure Laterality Date    APPENDECTOMY      Open    BACK SURGERY      CHOLECYSTECTOMY      laprascopic    COLONOSCOPY N/A 1/25/2018    Procedure: COLONOSCOPY;  Surgeon: Lashawn Myles MD;  [facility]  Service: Endoscopy;  Laterality: N/A;    COLONOSCOPY N/A 12/20/2021    Procedure: COLONOSCOPY;  Surgeon: Jesus Grayson MD;  [facility]  Service: Endoscopy;  Laterality: N/A;  start with a pediatric colonoscope and might need the slim pediatric colonoscope available.   priority case per Dr. Grayson-labwork am of procedure  RAPID COVID test coming for > 3 hrs away/ prep ins. [email or web address] / Pt requesting specific date for travel purposes    LIVER TRANSPLANT N/A 7/23/2018    Procedure: TRANSPLANT, LIVER;  Surgeon: Alma Joyce MD;  [facility];  Service: Transplant;  Laterality: N/A;    LUMBAR DISCECTOMY      SPLENIC ARTERY EMBOLIZATION  04/08/2016    Dr Taveras    THORACENTESIS Left 8/3/2020    Procedure: Thoracentesis  U/S notified;  Surgeon: Augustine Rodriguez MD;  Location: [facility];  Service: Pulmonary;  Laterality: Left;    THORACENTESIS Left 4/14/2021    Procedure: Thoracentesis;  Surgeon: Augustine Rodriguez MD;  Location: [facility];  Service: Pulmonary;  Laterality: Left;    TONSILLECTOMY           Family History:  Family History   Problem Relation Age of Onset    Melanoma Mother     Heart attack Neg Hx     Heart disease Neg Hx     Hypertension Neg Hx        Social History:  Social History     Tobacco Use    Smoking status: Never Smoker    Smokeless tobacco: Former User     Types: Chew     Quit date: 7/29/2018    Substance and Sexual Activity    Alcohol use: No     Alcohol/week: 0.0 standard drinks    Drug use: No    Sexual activity: Not Currently       ROS   Review of Systems   Constitutional: Negative for chills and fever.   HENT: Negative for sore throat.    Respiratory: Negative for shortness of breath.    Cardiovascular: Negative for chest pain.   Gastrointestinal: Positive for nausea and vomiting (hematemesis). Negative for abdominal pain, blood in stool and diarrhea.   Genitourinary: Negative for dysuria.   Musculoskeletal: Negative for back pain.   Skin: Negative for rash.   Neurological: Negative for dizziness, weakness, light-headedness, numbness and headaches.   Hematological: Does not bruise/bleed easily.   All other systems reviewed and are negative.    Physical Exam      Initial Vitals [08/23/22 1132]   BP Pulse Resp Temp SpO2   (!) 92/58 101 20 97.6 °F (36.4 °C) 100 %      MAP       --          Physical Exam  Nursing Notes and Vital Signs Reviewed.  Constitutional: Patient is in no acute distress. Well-developed and well-nourished.  Head: Atraumatic. Normocephalic.  Eyes: PERRL. EOM intact. Conjunctivae are not pale. No scleral icterus.  ENT: Mucous membranes are moist. Oropharynx is clear and symmetric.    Neck: Supple. Full ROM.  Cardiovascular: Regular rate. Regular rhythm. No murmurs, rubs, or gallops. Distal pulses are 2+ and symmetric.  Pulmonary/Chest: No respiratory distress. Clear to auscultation bilaterally. No wheezing or rales.  Abdominal: Soft and non-distended.  There is no tenderness.  No rebound, guarding, or rigidity.  Musculoskeletal: Moves all extremities. No obvious deformities. No edema.  Skin: Warm and dry.  Neurological:  Alert, awake, and appropriate.  Normal speech.  No acute focal neurological deficits are appreciated.  Psychiatric: Normal affect. Good eye contact. Appropriate in content.    ED Course    Critical Care    Date/Time: 8/23/2022 12:39 PM  Performed by: Jim Marrufo  "MD Eliel  Authorized by: Jim Marrufo Jr., MD   Direct patient critical care time: 15 minutes  Additional history critical care time: 5 minutes  Ordering / reviewing critical care time: 10 minutes  Documentation critical care time: 10 minutes  Consulting other physicians critical care time: 5 minutes  Total critical care time (exclusive of procedural time) : 45 minutes  Critical care time was exclusive of separately billable procedures and treating other patients and teaching time.  Critical care was necessary to treat or prevent imminent or life-threatening deterioration of the following conditions: Upper GI Bleed.  Critical care was time spent personally by me on the following activities: blood draw for specimens, development of treatment plan with patient or surrogate, discussions with consultants, interpretation of cardiac output measurements, evaluation of patient's response to treatment, examination of patient, obtaining history from patient or surrogate, ordering and performing treatments and interventions, ordering and review of laboratory studies, pulse oximetry, re-evaluation of patient's condition and review of old charts.        ED Vital Signs:  Vitals:    08/23/22 1400 08/23/22 1415 08/23/22 1425 08/23/22 1430   BP: (!) 105/56 (!) 91/55  (!) 87/54   Pulse: 103 103  101   Resp: (!) 25 (!) 21  16   Temp:       TempSrc:       SpO2: 99% 98%  97%   Weight:   85.3 kg (188 lb 0.8 oz)    Height:   6' 2.02" (1.88 m)     08/23/22 1445 08/23/22 1446 08/23/22 1500 08/23/22 1515   BP: (!) 98/57 (!) 98/57 (!) 97/57 (!) 101/57   Pulse: 100 98 100 101   Resp: 18 (!) 22 19 (!) 21   Temp:   98 °F (36.7 °C)    TempSrc:       SpO2: 98% 98% 99% 99%   Weight:       Height:        08/23/22 1530 08/23/22 1645 08/23/22 1700 08/23/22 1715   BP: (!) 114/58 (!) 94/53 (!) 94/52 (!) 93/55   Pulse: 102 107 103 101   Resp: (!) 23 (!) 26 19 (!) 21   Temp:       TempSrc:       SpO2: 100% 100% 100% 100%   Weight:       Height:        " 08/23/22 1730 08/23/22 1745 08/23/22 1800   BP: (!) 89/54 (!) 95/55 (!) 94/51   Pulse: 102 103 105   Resp: 19 20 (!) 23   Temp:      TempSrc:      SpO2: 99% 100% 100%   Weight:      Height:          Abnormal Lab Results:  Labs Reviewed   CBC W/ AUTO DIFFERENTIAL - Abnormal; Notable for the following components:       Result Value    RBC 2.97 (*)     Hemoglobin 9.3 (*)     Hematocrit 27.5 (*)     MCH 31.3 (*)     RDW 15.8 (*)     MPV 8.9 (*)     Immature Granulocytes 1.4 (*)     Gran # (ANC) 9.8 (*)     Immature Grans (Abs) 0.16 (*)     Lymph # 0.5 (*)     Gran % 85.6 (*)     Lymph % 4.7 (*)     All other components within normal limits   COMPREHENSIVE METABOLIC PANEL - Abnormal; Notable for the following components:    Sodium 135 (*)     CO2 22 (*)     Glucose 256 (*)      (*)     Creatinine 6.3 (*)     Albumin 2.6 (*)     Alkaline Phosphatase 47 (*)     AST 9 (*)     Anion Gap 17 (*)     eGFR 9 (*)     All other components within normal limits   PROTIME-INR   SARS-COV-2 RDRP GENE    Narrative:     This test utilizes isothermal nucleic acid amplification   technology to detect the SARS-CoV-2 RdRp nucleic acid segment.   The analytical sensitivity (limit of detection) is 125 genome   equivalents/mL.   A POSITIVE result implies infection with the SARS-CoV-2 virus;   the patient is presumed to be contagious.     A NEGATIVE result means that SARS-CoV-2 nucleic acids are not   present above the limit of detection. A NEGATIVE result should be   treated as presumptive. It does not rule out the possibility of   COVID-19 and should not be the sole basis for treatment decisions.   If COVID-19 is strongly suspected based on clinical and exposure   history, re-testing using an alternate molecular assay should be   considered.   This test is only for use under the Food and Drug   Administration s Emergency Use Authorization (EUA).   Commercial kits are provided by [employer].   Performance characteristics of the  EUA have been independently   verified by [facility] Department of   Pathology and Laboratory Medicine.   _________________________________________________________________   The authorized Fact Sheet for Healthcare Providers and the authorized Fact   Sheet for Patients of the ID NOW COVID-19 are available on the FDA   website:     https://www.fda.gov/media/116358/download  https://www.fda.gov/media/641929/download           TYPE & SCREEN        All Lab Results:  Results for orders placed or performed during the hospital encounter of 08/23/22   CBC auto differential   Result Value Ref Range    WBC 11.46 3.90 - 12.70 K/uL    RBC 2.97 (L) 4.60 - 6.20 M/uL    Hemoglobin 9.3 (L) 14.0 - 18.0 g/dL    Hematocrit 27.5 (L) 40.0 - 54.0 %    MCV 93 82 - 98 fL    MCH 31.3 (H) 27.0 - 31.0 pg    MCHC 33.8 32.0 - 36.0 g/dL    RDW 15.8 (H) 11.5 - 14.5 %    Platelets 182 150 - 450 K/uL    MPV 8.9 (L) 9.2 - 12.9 fL    Immature Granulocytes 1.4 (H) 0.0 - 0.5 %    Gran # (ANC) 9.8 (H) 1.8 - 7.7 K/uL    Immature Grans (Abs) 0.16 (H) 0.00 - 0.04 K/uL    Lymph # 0.5 (L) 1.0 - 4.8 K/uL    Mono # 0.9 0.3 - 1.0 K/uL    Eos # 0.0 0.0 - 0.5 K/uL    Baso # 0.04 0.00 - 0.20 K/uL    nRBC 0 0 /100 WBC    Gran % 85.6 (H) 38.0 - 73.0 %    Lymph % 4.7 (L) 18.0 - 48.0 %    Mono % 7.8 4.0 - 15.0 %    Eosinophil % 0.2 0.0 - 8.0 %    Basophil % 0.3 0.0 - 1.9 %    Differential Method Automated    Comprehensive metabolic panel   Result Value Ref Range    Sodium 135 (L) 136 - 145 mmol/L    Potassium 4.0 3.5 - 5.1 mmol/L    Chloride 96 95 - 110 mmol/L    CO2 22 (L) 23 - 29 mmol/L    Glucose 256 (H) 70 - 110 mg/dL     (H) 8 - 23 mg/dL    Creatinine 6.3 (H) 0.5 - 1.4 mg/dL    Calcium 9.0 8.7 - 10.5 mg/dL    Total Protein 6.1 6.0 - 8.4 g/dL    Albumin 2.6 (L) 3.5 - 5.2 g/dL    Total Bilirubin 0.6 0.1 - 1.0 mg/dL    Alkaline Phosphatase 47 (L) 55 - 135 U/L    AST 9 (L) 10 - 40 U/L    ALT 10 10 - 44 U/L    Anion Gap 17 (H) 8 - 16 mmol/L    eGFR  9 (A) >60 mL/min/1.73 m^2   Protime-INR   Result Value Ref Range    Prothrombin Time 11.2 9.0 - 12.5 sec    INR 1.0 0.8 - 1.2   CBC auto differential   Result Value Ref Range    WBC 14.84 (H) 3.90 - 12.70 K/uL    RBC 2.83 (L) 4.60 - 6.20 M/uL    Hemoglobin 9.0 (L) 14.0 - 18.0 g/dL    Hematocrit 27.4 (L) 40.0 - 54.0 %    MCV 97 82 - 98 fL    MCH 31.8 (H) 27.0 - 31.0 pg    MCHC 32.8 32.0 - 36.0 g/dL    RDW 16.8 (H) 11.5 - 14.5 %    Platelets 196 150 - 450 K/uL    MPV 8.8 (L) 9.2 - 12.9 fL    Immature Granulocytes 2.2 (H) 0.0 - 0.5 %    Gran # (ANC) 11.9 (H) 1.8 - 7.7 K/uL    Immature Grans (Abs) 0.32 (H) 0.00 - 0.04 K/uL    Lymph # 0.8 (L) 1.0 - 4.8 K/uL    Mono # 1.7 (H) 0.3 - 1.0 K/uL    Eos # 0.0 0.0 - 0.5 K/uL    Baso # 0.05 0.00 - 0.20 K/uL    nRBC 0 0 /100 WBC    Gran % 80.0 (H) 38.0 - 73.0 %    Lymph % 5.7 (L) 18.0 - 48.0 %    Mono % 11.7 4.0 - 15.0 %    Eosinophil % 0.1 0.0 - 8.0 %    Basophil % 0.3 0.0 - 1.9 %    Differential Method Automated    CBC Auto Differential   Result Value Ref Range    WBC 16.52 (H) 3.90 - 12.70 K/uL    RBC 2.92 (L) 4.60 - 6.20 M/uL    Hemoglobin 8.9 (L) 14.0 - 18.0 g/dL    Hematocrit 28.1 (L) 40.0 - 54.0 %    MCV 96 82 - 98 fL    MCH 30.5 27.0 - 31.0 pg    MCHC 31.7 (L) 32.0 - 36.0 g/dL    RDW 16.6 (H) 11.5 - 14.5 %    Platelets 274 150 - 450 K/uL    MPV 9.4 9.2 - 12.9 fL    Immature Granulocytes 3.0 (H) 0.0 - 0.5 %    Gran # (ANC) 11.7 (H) 1.8 - 7.7 K/uL    Immature Grans (Abs) 0.50 (H) 0.00 - 0.04 K/uL    Lymph # 2.2 1.0 - 4.8 K/uL    Mono # 1.9 (H) 0.3 - 1.0 K/uL    Eos # 0.1 0.0 - 0.5 K/uL    Baso # 0.11 0.00 - 0.20 K/uL    nRBC 0 0 /100 WBC    Gran % 71.0 38.0 - 73.0 %    Lymph % 13.2 (L) 18.0 - 48.0 %    Mono % 11.7 4.0 - 15.0 %    Eosinophil % 0.4 0.0 - 8.0 %    Basophil % 0.7 0.0 - 1.9 %    Platelet Estimate Appears normal     Differential Method Automated    Phosphorus   Result Value Ref Range    Phosphorus 4.5 2.7 - 4.5 mg/dL   POCT COVID-19 Rapid Screening   Result Value  Ref Range    POC Rapid COVID Negative Negative     Acceptable Yes    Type & Screen   Result Value Ref Range    Group & Rh A POS     Indirect Amaya NEG    POCT glucose   Result Value Ref Range    POCT Glucose 295 (H) 70 - 110 mg/dL   Prepare RBC 4 Units; Arterial hemorrhage   Result Value Ref Range    UNIT NUMBER [id number]     Product Code V3992N10     DISPENSE STATUS CROSSMATCHED     CODING SYSTEM FRMT835     Unit Blood Type Code 6200     Unit Blood Type A POS     Unit Expiration [id number]     UNIT NUMBER [id number]     Product Code P5667Z51     DISPENSE STATUS CROSSMATCHED     CODING SYSTEM NLWT868     Unit Blood Type Code 6200     Unit Blood Type A POS     Unit Expiration [id number]     UNIT NUMBER [id number]     Product Code E0104N29     DISPENSE STATUS CROSSMATCHED     CODING SYSTEM AAWW029     Unit Blood Type Code 6200     Unit Blood Type A POS     Unit Expiration [id number]     UNIT NUMBER [id number]     Product Code Z6866J83     DISPENSE STATUS CROSSMATCHED     CODING SYSTEM IUOD643     Unit Blood Type Code 6200     Unit Blood Type A POS     Unit Expiration [id number]      *Note: Due to a large number of results and/or encounters for the requested time period, some results have not been displayed. A complete set of results can be found in Results Review.     Imaging Results:  Imaging Results    None        The EKG was ordered, reviewed, and independently interpreted by the ED provider.  Interpretation time: 12:23  Rate: 101 BPM  Rhythm: sinus tachycardia  Interpretation: Possible lateral infarct, age undetermined. No STEMI.           The Emergency Provider reviewed the vital signs and test results, which are outlined above.    ED Discussion     12:30 PM: Discussed pt's case with Dr. Houser (Gastroenterology) who recommends stat CBC, CMP, INR, COVID screen, and admission to Hospital Medicine. Dr. Houser will come evaluate pt at bedside.    12:36 PM: Discussed case with  Annalee Curry NP ([facility]). Dr. Nelson agrees with current care and management of pt and accepts admission.   Admitting Service: Hospital Medicine  Admitting Physician: Dr. Nelson  Admit to: ICU    12:38 PM: Re-evaluated pt. I have discussed test results, shared treatment plan, and the need for admission with patient and family at bedside. Pt and family express understanding at this time and agree with all information. All questions answered. Pt and family have no further questions or concerns at this time. Pt is ready for admit.         ED Medication(s):  Medications   sodium chloride 0.9% flush 10 mL (has no administration in time range)   melatonin tablet 6 mg (has no administration in time range)   pantoprazole 40 mg in  mL infusion (ready to mix system) (8 mg/hr Intravenous New Bag 8/23/22 1756)   NORepinephrine 4 mg in dextrose 5% 250 mL infusion (premix) (titrating) (0 mcg/kg/min × 85.3 kg Intravenous Not Given 8/23/22 1330)   mupirocin 2 % ointment (has no administration in time range)   ondansetron disintegrating tablet 4 mg (has no administration in time range)   metoclopramide HCl injection 10 mg (10 mg Intravenous Not Given 8/23/22 1500)   senna-docusate 8.6-50 mg per tablet 2 tablet (has no administration in time range)   dextrose 10 % infusion (has no administration in time range)   glucagon (human recombinant) injection 1 mg (has no administration in time range)   dextrose 10% bolus 125 mL (has no administration in time range)   dextrose 10% bolus 250 mL (has no administration in time range)   insulin aspart U-100 pen 1-10 Units (6 Units Subcutaneous Given 8/23/22 1841)   0.9%  NaCl infusion (for blood administration) (has no administration in time range)   simethicone 40 mg/0.6 mL drops (200 mg  Given 8/23/22 1543)   propofoL (DIPRIVAN) 10 mg/mL infusion (  Override pull for Anesthesia 8/23/22 1610)   EPINEPHrine 0.1 mg/mL injection (0.9 mg Intravenous Given 8/23/22 1608)           Current Discharge Medication List            Medical Decision Making    Medical Decision Making:   Clinical Tests:   Lab Tests: Ordered and Reviewed  Medical Tests: Ordered and Reviewed           Scribe Attestation:   Scribe #1: I performed the above scribed service and the documentation accurately describes the services I performed. I attest to the accuracy of the note.    Attending:   Physician Attestation Statement for Scribe #1: I, Jim Marrufo Jr., MD, personally performed the services described in this documentation, as scribed by Anuj Braswell, in my presence, and it is both accurate and complete.          Clinical Impression       ICD-10-CM ICD-9-CM   1. Upper GI bleed  K92.2 578.9   2. GI bleed  K92.2 578.9   3. Acute blood loss anemia  D62 285.1   4. H/O liver transplant  Z94.4 V42.7       Disposition:   Disposition: Admitted  Condition: Serious         Jim Marrufo Jr., MD  08/23/22 1912

## 2022-08-23 NOTE — SUBJECTIVE & OBJECTIVE
Past Medical History:   Diagnosis Date    Allergy     Anticoagulant long-term use     Arthritis     Back pain     Cellulitis     Congenital heart disease     Hearing loss     right ear    Hepatic encephalopathy     HTN (hypertension)     Hypertension     diagnosed today (11/25/2015) and prescribed toprol    Leg edema     Nephrolithiasis     JACK (obstructive sleep apnea)     JACK (obstructive sleep apnea)     Seizures     per Pt last seizure 2018- controlled wit medication    Splenomegaly        All history:  Patient states he is feeling better.  He specifically denies nausea, vomiting, hematemesis, abdominal pain, melena, blood per rectum.    In addition, he denies headache, chest pain, shortness of breath.  Objective:     Vital Signs (Most Recent):  Temp: 98.4 °F (36.9 °C) (08/24/22 0715)  Pulse: 103 (08/24/22 0815)  Resp: (!) 22 (08/24/22 0815)  BP: (!) 98/57 (08/24/22 0815)  SpO2: 97 % (08/24/22 0815) Vital Signs (24h Range):  Temp:  [97.6 °F (36.4 °C)-98.4 °F (36.9 °C)] 98.4 °F (36.9 °C)  Pulse:  [] 103  Resp:  [16-33] 22  SpO2:  [97 %-100 %] 97 %  BP: ()/(50-70) 98/57     Weight: 85.3 kg (188 lb 0.8 oz)  Body mass index is 24.13 kg/m².      Intake/Output Summary (Last 24 hours) at 8/24/2022 0852  Last data filed at 8/24/2022 0845  Gross per 24 hour   Intake 913.29 ml   Output 1314 ml   Net -400.71 ml       Physical Exam  Vitals reviewed.   Constitutional:       Appearance: Normal appearance.   HENT:      Head: Normocephalic and atraumatic.      Nose: Nose normal.   Eyes:      Extraocular Movements: Extraocular movements intact.      Pupils: Pupils are equal, round, and reactive to light.   Cardiovascular:      Rate and Rhythm: Normal rate and regular rhythm.   Pulmonary:      Effort: Pulmonary effort is normal. No respiratory distress.      Comments: Symmetric chest rise.  Abdominal:      General: Abdomen is flat. There is no distension.      Palpations: Abdomen is soft.   Musculoskeletal:          General: No deformity or signs of injury.      Right lower leg: No edema.      Left lower leg: No edema.   Skin:     General: Skin is warm and dry.   Neurological:      General: No focal deficit present.      Mental Status: He is alert and oriented to person, place, and time.       Vents:       Lines/Drains/Airways       Peripheral Intravenous Line  Duration                  Peripheral IV - Single Lumen 08/23/22 0000 20 G Distal;Left;Posterior Wrist 1 day         Peripheral IV - Single Lumen 08/23/22 0000 20 G Left;Posterior Hand 1 day         Peripheral IV - Single Lumen 08/23/22 1239 18 G Right Antecubital <1 day                    Significant Labs:    CBC/Anemia Profile:  Recent Labs   Lab 08/23/22  1812 08/24/22  0019 08/24/22  0535   WBC 14.84* 8.85 8.22   HGB 9.0* 8.6* 8.4*   HCT 27.4* 25.1* 26.0*    179 179   MCV 97 92 93   RDW 16.8* 16.9* 16.4*        Chemistries:  Recent Labs   Lab 08/23/22  1239 08/23/22  1811 08/24/22  0535   *  --  135*   K 4.0  --  3.5   CL 96  --  97   CO2 22*  --  21*   *  --  106*   CREATININE 6.3*  --  6.5*   CALCIUM 9.0  --  8.3*   ALBUMIN 2.6*  --   --    PROT 6.1  --   --    BILITOT 0.6  --   --    ALKPHOS 47*  --   --    ALT 10  --   --    AST 9*  --   --    MG  --   --  1.5*   PHOS  --  4.5  --        A1C:   Recent Labs   Lab 08/23/22  1414   HGBA1C 5.6     Bilirubin:   Recent Labs   Lab 08/23/22  1239   BILITOT 0.6     Coagulation:   Recent Labs   Lab 08/24/22  0535   INR 1.0     Lactic Acid: No results for input(s): LACTATE in the last 48 hours.  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  Troponin: No results for input(s): TROPONINI in the last 48 hours.  Urine Culture: No results for input(s): LABURIN in the last 48 hours.  Urine Studies: No results for input(s): COLORU, APPEARANCEUA, PHUR, SPECGRAV, PROTEINUA, GLUCUA, KETONESU, BILIRUBINUA, OCCULTUA, NITRITE, UROBILINOGEN, LEUKOCYTESUR, RBCUA, WBCUA, BACTERIA, SQUAMEPITHEL, HYALINECASTS in the last 48 hours.    Invalid input(s): SUKH    Significant Imaging:   I have reviewed all pertinent imaging results/findings within the past 24 hours.

## 2022-08-23 NOTE — HPI
67M pmh EtOH cirrhosis s/p  liver transplant 2018 (on tacrolimus),  thrombocytopenia, CAD, HTN,, , DM, recurrent L pleural effusion (s/p prior thoracenteses), ESRD on peritoneal dialysis, JACK presented to [facility] in [place], [place] the 8/23/22 morning with hematemesis. Now transferred to [facility] afternoon of 8/23/22for GI evaluation and care.    Hematemesis started yesterday 8/22/22.  Patient takes ASA daily, but no other anticoagulants.  He went to another ED and was discharged.  Hematemesis returned this morning and he went to [facility] ED.  BP was soft and pt was given fluids and 2un RBCs.  He was given a Protonix bolus and drip.  However, the patient became nauseous and the protonix was stopped per report.  He was also started on octreotide.     When pt arrived to [facility] ED, labs were repeated.  Hgb 9.3, Plt 182.

## 2022-08-23 NOTE — HOSPITAL COURSE
8/23:  Transferred from [place], [place].  GI consult.  Serial CBC.  Stop octreotide.  Resume protonix. EGD wit gastric fundus lesion with artery that was cauterized, injected with epi and 3 clips with hemostasis.  8/24:  No sign of bleeding overnight.  Hgb 8.4, Plt 179, INR 1.0.  Remains on protonix drip.  GI started clears.

## 2022-08-23 NOTE — SUBJECTIVE & OBJECTIVE
Past Medical History:   Diagnosis Date    Allergy     Anticoagulant long-term use     Arthritis     Back pain     Cellulitis     Congenital heart disease     Hearing loss     right ear    Hepatic encephalopathy     HTN (hypertension)     Hypertension     diagnosed today (11/25/2015) and prescribed toprol    Leg edema     Nephrolithiasis     JACK (obstructive sleep apnea)     JACK (obstructive sleep apnea)     Seizures     per Pt last seizure 2018- controlled wit medication    Splenomegaly        Past Surgical History:   Procedure Laterality Date    APPENDECTOMY      Open    BACK SURGERY      CHOLECYSTECTOMY      laprascopic    COLONOSCOPY N/A 1/25/2018    Procedure: COLONOSCOPY;  Surgeon: Lashawn Myles MD;  [facility]  Service: Endoscopy;  Laterality: N/A;    COLONOSCOPY N/A 12/20/2021    Procedure: COLONOSCOPY;  Surgeon: Jesus Grayson MD;  [facility]  Service: Endoscopy;  Laterality: N/A;  start with a pediatric colonoscope and might need the slim pediatric colonoscope available.   priority case per Dr. Grayson-labwork am of procedure  RAPID COVID test coming for > 3 hrs away/ prep ins. [email or web address] / Pt requesting specific date for travel purposes    LIVER TRANSPLANT N/A 7/23/2018    Procedure: TRANSPLANT, LIVER;  Surgeon: Alma Joyce MD;  Location: [facility] OR [address] [place] [address];  Service: Transplant;  Laterality: N/A;    LUMBAR DISCECTOMY      SPLENIC ARTERY EMBOLIZATION  04/08/2016    Dr Taveras    THORACENTESIS Left 8/3/2020    Procedure: Thoracentesis  U/S notified;  Surgeon: Augustine Rodriguez MD;  Location: [facility];  Service: Pulmonary;  Laterality: Left;    THORACENTESIS Left 4/14/2021    Procedure: Thoracentesis;  Surgeon: Augustine Rodriguez MD;  Location: [facility];  Service: Pulmonary;  Laterality: Left;    TONSILLECTOMY         Review of patient's allergies indicates:   Allergen Reactions    Nsaids (non-steroidal anti-inflammatory drug)      D/t to liver  disease.  Other reaction(s): Unknown    Penicillins Other (See Comments)     Tolerated pip-tazo in 8/2016 without issue  Tolerated cefepime 7/2018 without issue  Since childhood was told not to take it  Other reaction(s): Unknown     Family History       Problem Relation (Age of Onset)    Melanoma Mother          Tobacco Use    Smoking status: Never Smoker    Smokeless tobacco: Former User     Types: Chew     Quit date: 7/29/2018   Substance and Sexual Activity    Alcohol use: No     Alcohol/week: 0.0 standard drinks    Drug use: No    Sexual activity: Not Currently     Review of Systems   Constitutional:  Positive for fatigue.   Gastrointestinal:  Positive for vomiting.   Skin:  Positive for color change.   Neurological:  Positive for weakness and light-headedness.   All other systems reviewed and are negative.  Objective:     Vital Signs (Most Recent):  Temp: 97.6 °F (36.4 °C) (08/23/22 1144)  Pulse: 98 (08/23/22 1446)  Resp: (!) 22 (08/23/22 1446)  BP: (!) 98/57 (08/23/22 1446)  SpO2: 98 % (08/23/22 1446)   Vital Signs (24h Range):  Temp:  [97.6 °F (36.4 °C)] 97.6 °F (36.4 °C)  Pulse:  [] 98  Resp:  [18-22] 22  SpO2:  [98 %-100 %] 98 %  BP: ()/(56-60) 98/57     Weight: 85.3 kg (188 lb 0.8 oz) (08/23/22 1425)  Body mass index is 24.13 kg/m².    No intake or output data in the 24 hours ending 08/23/22 1508    Lines/Drains/Airways       Peripheral Intravenous Line  Duration                  Peripheral IV - Single Lumen 08/23/22 0000 20 G Distal;Left;Posterior Wrist <1 day         Peripheral IV - Single Lumen 08/23/22 0000 20 G Left;Posterior Hand <1 day         Peripheral IV - Single Lumen 08/23/22 1239 18 G Right Antecubital <1 day                    Physical Exam  Constitutional:       Appearance: He is obese. He is ill-appearing.      Comments: Appears older than stated age   Cardiovascular:      Rate and Rhythm: Regular rhythm. Tachycardia present.      Heart sounds: Normal heart sounds.       Comments: Bilateral brawning of the lower extremities with excessive dry skin.   Pulmonary:      Effort: Pulmonary effort is normal.      Breath sounds: Normal breath sounds.   Abdominal:      General: Abdomen is protuberant. Bowel sounds are normal.      Palpations: Abdomen is soft.      Tenderness: There is no abdominal tenderness.   Musculoskeletal:        Legs:       Comments: Bilteral brawning of the lower extremities. Bilateral onychomycosis of all toe nails.    Skin:     General: Skin is warm and dry.      Coloration: Skin is pale.      Comments: Excessive dry skin of the lower extremities.    Neurological:      General: No focal deficit present.      Mental Status: He is alert and oriented to person, place, and time. Mental status is at baseline.   Psychiatric:         Attention and Perception: Attention normal.         Mood and Affect: Mood normal.         Speech: Speech normal.         Behavior: Behavior normal. Behavior is cooperative.         Thought Content: Thought content normal.         Cognition and Memory: Cognition normal.         Judgment: Judgment normal.       Significant Labs:  CBC:   Recent Labs   Lab 08/23/22  1239   WBC 11.46   HGB 9.3*   HCT 27.5*        CMP:   Recent Labs   Lab 08/23/22  1239   *   CALCIUM 9.0   ALBUMIN 2.6*   PROT 6.1   *   K 4.0   CO2 22*   CL 96   *   CREATININE 6.3*   ALKPHOS 47*   ALT 10   AST 9*   BILITOT 0.6     Coagulation:   Recent Labs   Lab 08/23/22  1239   INR 1.0       Significant Imaging:  Imaging results within the past 24 hours have been reviewed.

## 2022-08-23 NOTE — ASSESSMENT & PLAN NOTE
No further hematemesis  PPI drip.  S/p EGD with gastric fundus lesion w/ artery with clip x3, cautery, and injected with epi  Serial CBC  Transfuse as appropriate for goal Hgb > 7, active bleeding, goal Plt > 50, Goal INR <= 1.6  GI consult

## 2022-08-23 NOTE — H&P
[facility] - Intensive Care ([facility] Medicine  History & Physical    Patient Name: Alon Adamson  MRN: [id number]  Patient Class: IP- Inpatient  Admission Date: 8/23/2022  Attending Physician: Ector Nelson MD   Primary Care Provider: Bruno Oconnor NP         Patient information was obtained from patient, spouse/SO, past medical records and ER records.     Subjective:     Principal Problem:<principal problem not specified>    Chief Complaint:   Chief Complaint   Patient presents with    Hematemesis     Transfer from [facility] for GI services of hematemesis that began approx 0600. Pt hx of liver transplant and PD at night. Also sent for hypotension. PT AAO x3, denies pain.         HPI: 67-year-old male with hx of alcoholic cirrhosis s/p liver transplant in 2018 (on tacrolimus), CAD, HTN, DM, thrombocytopenia, recurrent/persistent left pleural effusion (s/p prior thoracenteses), ESRD on PD, anemia (hemoglobin 9.1 in 4/ 2022), and JACK presented to [facility] in [place] on the morning of August 23 with hematemesis. He is on ASA but no other anticoagulation. Episodes of hematemesis started the day prior, and he was seen at a different emergency department. Patient said he was told he had diverticulitis and was sent home on oral antibiotics. He noted having additional hematemesis this morning. He had 1 episode of dark bloody emesis in the ER. He is awake and alert with no alteration in mentation. Initial SBP were in the 70s, and he received a small bolus of NS and was started on 2 units of blood. He also was given 80 mg Protonix and started on Protonix infusion. Octreotide initiated and then d/mumtaz as BP improved with pRBCs. He was transferred to [facility] ER for GI/EGD, accepted by Dr. Houser.  Initial labs COVID negative, INR 1.03, H/H 8/23, platelets 250. Repeat labs here Hgb 9.3, Plt 182. He is admitted to ICU under hospitals for EGD and may need Levophed gtt for BP support.          Past Medical History:   Diagnosis Date    Allergy     Anticoagulant long-term use     Arthritis     Back pain     Cellulitis     Congenital heart disease     Hearing loss     right ear    Hepatic encephalopathy     HTN (hypertension)     Hypertension     diagnosed today (11/25/2015) and prescribed toprol    Leg edema     Nephrolithiasis     JACK (obstructive sleep apnea)     JACK (obstructive sleep apnea)     Seizures     per Pt last seizure 2018- controlled wit medication    Splenomegaly        Past Surgical History:   Procedure Laterality Date    APPENDECTOMY      Open    BACK SURGERY      CHOLECYSTECTOMY      laprascopic    COLONOSCOPY N/A 1/25/2018    Procedure: COLONOSCOPY;  Surgeon: Lashawn Myles MD;  [facility]  Service: Endoscopy;  Laterality: N/A;    COLONOSCOPY N/A 12/20/2021    Procedure: COLONOSCOPY;  Surgeon: Jesus Grayson MD;  [facility]  Service: Endoscopy;  Laterality: N/A;  start with a pediatric colonoscope and might need the slim pediatric colonoscope available.   priority case per Dr. Grayson-labwork am of procedure  RAPID COVID test coming for > 3 hrs away/ prep ins. [email or web address] / Pt requesting specific date for travel purposes    LIVER TRANSPLANT N/A 7/23/2018    Procedure: TRANSPLANT, LIVER;  Surgeon: Alma Joyce MD;  Location: [facility] OR [address] [place] [address];  Service: Transplant;  Laterality: N/A;    LUMBAR DISCECTOMY      SPLENIC ARTERY EMBOLIZATION  04/08/2016    Dr Taveras    THORACENTESIS Left 8/3/2020    Procedure: Thoracentesis  U/S notified;  Surgeon: Augustine Rodriguez MD;  Location: [facility];  Service: Pulmonary;  Laterality: Left;    THORACENTESIS Left 4/14/2021    Procedure: Thoracentesis;  Surgeon: Augustine Rodriguez MD;  Location: [facility];  Service: Pulmonary;  Laterality: Left;    TONSILLECTOMY         Review of patient's allergies indicates:   Allergen Reactions    Nsaids (non-steroidal  anti-inflammatory drug)      D/t to liver disease.  Other reaction(s): Unknown    Penicillins Other (See Comments)     Tolerated pip-tazo in 8/2016 without issue  Tolerated cefepime 7/2018 without issue  Since childhood was told not to take it  Other reaction(s): Unknown       No current facility-administered medications on file prior to encounter.     Current Outpatient Medications on File Prior to Encounter   Medication Sig    aspirin (ECOTRIN) 81 MG EC tablet Take 81 mg by mouth once daily.     atorvastatin (LIPITOR) 40 MG tablet Take 40 mg by mouth nightly.    B complex-vitamin C-folic acid (STAR-LAURY/NEPHRO-LAURY) 0.8 mg Tab Take 1 tablet by mouth once daily.    cyclobenzaprine (FLEXERIL) 5 MG tablet Take 5 mg by mouth 3 (three) times daily as needed.    furosemide (LASIX) 40 MG tablet Take 80 mg by mouth 2 (two) times a day.    gentamicin (GARAMYCIN) 0.1 % cream Apply topically 2 (two) times daily.    levothyroxine (SYNTHROID) 50 MCG tablet Take 50 mcg by mouth once daily.    ondansetron (ZOFRAN) 4 MG tablet TAKE 1 TABLET BY MOUTH EVERY 8 HOURS FOR 4 DAYS    promethazine (PHENERGAN) 25 MG tablet Take 25 mg by mouth 3 (three) times daily as needed.    sevelamer carbonate (RENVELA) 800 mg Tab Take 1,600 mg by mouth 3 (three) times daily with meals.    tacrolimus (PROGRAF) 1 MG Cap Take 2 capsules (2 mg total) by mouth every morning AND 1 capsule (1 mg total) every evening.    ergocalciferol (VITAMIN D2) 50,000 unit Cap Take 50,000 Units by mouth every 7 days. (Saturdays)    oxyCODONE (ROXICODONE) 5 MG immediate release tablet Take 5 mg by mouth every 12 (twelve) hours as needed.     Family History       Problem Relation (Age of Onset)    Melanoma Mother          Tobacco Use    Smoking status: Never Smoker    Smokeless tobacco: Former User     Types: Chew     Quit date: 7/29/2018   Substance and Sexual Activity    Alcohol use: No     Alcohol/week: 0.0 standard drinks    Drug use: No    Sexual  activity: Not Currently     Review of Systems   Constitutional:  Positive for activity change, appetite change and fatigue. Negative for chills and fever.   HENT: Negative.     Eyes: Negative.    Respiratory:  Negative for wheezing.    Gastrointestinal:  Positive for abdominal distention. Negative for abdominal pain, diarrhea, nausea and vomiting.   Endocrine: Negative.    Genitourinary: Negative.    Musculoskeletal:  Positive for back pain and gait problem.   Skin:  Positive for pallor.   Allergic/Immunologic: Negative.    Neurological:  Positive for weakness. Negative for seizures and headaches.   Psychiatric/Behavioral: Negative.     All other systems reviewed and are negative.  Objective:     Vital Signs (Most Recent):  Temp: 97.6 °F (36.4 °C) (08/23/22 1144)  Pulse: 98 (08/23/22 1446)  Resp: (!) 22 (08/23/22 1446)  BP: (!) 98/57 (08/23/22 1446)  SpO2: 98 % (08/23/22 1446)   Vital Signs (24h Range):  Temp:  [97.6 °F (36.4 °C)] 97.6 °F (36.4 °C)  Pulse:  [] 98  Resp:  [18-22] 22  SpO2:  [98 %-100 %] 98 %  BP: ()/(56-60) 98/57     Weight: 85.3 kg (188 lb 0.8 oz)  Body mass index is 24.13 kg/m².    Physical Exam  Vitals and nursing note reviewed.   Constitutional:       Appearance: Normal appearance.   HENT:      Head: Normocephalic and atraumatic.      Nose: Nose normal.   Eyes:      Extraocular Movements: Extraocular movements intact.      Pupils: Pupils are equal, round, and reactive to light.   Cardiovascular:      Rate and Rhythm: Normal rate and regular rhythm.   Pulmonary:      Effort: Pulmonary effort is normal. No respiratory distress.      Comments: Symmetric chest rise.  Abdominal:      General: Abdomen is flat. There is no distension.      Palpations: Abdomen is soft.   Musculoskeletal:         General: No deformity or signs of injury.      Right lower leg: No edema.      Left lower leg: No edema.   Skin:     General: Skin is warm and dry.   Neurological:      General: No focal deficit  present.      Mental Status: He is alert and oriented to person, place, and time.         CRANIAL NERVES     CN III, IV, VI   Pupils are equal, round, and reactive to light.  Results for orders placed or performed during the hospital encounter of 08/23/22   CBC auto differential   Result Value Ref Range    WBC 11.46 3.90 - 12.70 K/uL    RBC 2.97 (L) 4.60 - 6.20 M/uL    Hemoglobin 9.3 (L) 14.0 - 18.0 g/dL    Hematocrit 27.5 (L) 40.0 - 54.0 %    MCV 93 82 - 98 fL    MCH 31.3 (H) 27.0 - 31.0 pg    MCHC 33.8 32.0 - 36.0 g/dL    RDW 15.8 (H) 11.5 - 14.5 %    Platelets 182 150 - 450 K/uL    MPV 8.9 (L) 9.2 - 12.9 fL    Immature Granulocytes 1.4 (H) 0.0 - 0.5 %    Gran # (ANC) 9.8 (H) 1.8 - 7.7 K/uL    Immature Grans (Abs) 0.16 (H) 0.00 - 0.04 K/uL    Lymph # 0.5 (L) 1.0 - 4.8 K/uL    Mono # 0.9 0.3 - 1.0 K/uL    Eos # 0.0 0.0 - 0.5 K/uL    Baso # 0.04 0.00 - 0.20 K/uL    nRBC 0 0 /100 WBC    Gran % 85.6 (H) 38.0 - 73.0 %    Lymph % 4.7 (L) 18.0 - 48.0 %    Mono % 7.8 4.0 - 15.0 %    Eosinophil % 0.2 0.0 - 8.0 %    Basophil % 0.3 0.0 - 1.9 %    Differential Method Automated    Comprehensive metabolic panel   Result Value Ref Range    Sodium 135 (L) 136 - 145 mmol/L    Potassium 4.0 3.5 - 5.1 mmol/L    Chloride 96 95 - 110 mmol/L    CO2 22 (L) 23 - 29 mmol/L    Glucose 256 (H) 70 - 110 mg/dL     (H) 8 - 23 mg/dL    Creatinine 6.3 (H) 0.5 - 1.4 mg/dL    Calcium 9.0 8.7 - 10.5 mg/dL    Total Protein 6.1 6.0 - 8.4 g/dL    Albumin 2.6 (L) 3.5 - 5.2 g/dL    Total Bilirubin 0.6 0.1 - 1.0 mg/dL    Alkaline Phosphatase 47 (L) 55 - 135 U/L    AST 9 (L) 10 - 40 U/L    ALT 10 10 - 44 U/L    Anion Gap 17 (H) 8 - 16 mmol/L    eGFR 9 (A) >60 mL/min/1.73 m^2   Protime-INR   Result Value Ref Range    Prothrombin Time 11.2 9.0 - 12.5 sec    INR 1.0 0.8 - 1.2   POCT COVID-19 Rapid Screening   Result Value Ref Range    POC Rapid COVID Negative Negative     Acceptable Yes    Type & Screen   Result Value Ref Range    Group  & Rh A POS     Indirect Amaya NEG      *Note: Due to a large number of results and/or encounters for the requested time period, some results have not been displayed. A complete set of results can be found in Results Review.      Significant Labs: All pertinent labs within the past 24 hours have been reviewed.    Significant Imaging: I have reviewed all pertinent imaging results/findings within the past 24 hours.    Assessment/Plan:     Acute upper GI bleeding  Hx of Liver Cirrhosis with Esophageal varices in the past s/p Liver Tx in 2018  S/p IVF, Protonix Gtt plus Octreotide gtt  Admitted to ICU, await EGD    Acute blood loss anemia (ABLA)  Sec to Hematemesis s/p 2 units of blood  Getting IV Protonix and got IV Octreotide  Await EGD  T&C 2 more units of blood      Hypotension due to hypovolemia  Sec to Acute Blood Loss and possibly on going bleeding, s/p 2 units of blood and Octreotide,  May need Levophed gtt, more blood          Liver transplanted  GI consulted, cont Prograf      ESRD on peritoneal dialysis  Renal consulted- cont PD      Coronary artery disease involving native coronary artery of native heart without angina pectoris  Stable, no ischemia      JACK (obstructive sleep apnea)  Cont Bipap qhs        VTE Risk Mitigation (From admission, onward)         Ordered     IP VTE LOW RISK PATIENT  Once         08/23/22 1402     Place ESE hose  Until discontinued         08/23/22 1402              Critical care time spent on the evaluation and treatment of severe organ dysfunction, review of pertinent labs and imaging studies, discussions with consulting providers and discussions with patient/family: 55 minutes.     Ector Nelson MD  Department of Hospital Medicine   [facility] - Intensive Care ([facility])

## 2022-08-23 NOTE — ASSESSMENT & PLAN NOTE
Sec to Hematemesis s/p 2 units of blood  Getting IV Protonix and got IV Octreotide  Await EGD  T&C 2 more units of blood

## 2022-08-23 NOTE — CONSULTS
[facility] - Intensive Care ([facility])  Gastroenterology  Consult Note    Patient Name: Alon Adamson  MRN: [id number]  Admission Date: 8/23/2022  Hospital Length of Stay: 0 days  Code Status: Full Code   Attending Provider: Ector Nelson MD   Consulting Provider: Nora Houser MD  Primary Care Physician: Bruno Oconnor NP  Principal Problem:<principal problem not specified>    Inpatient consult to Gastroenterology  Consult performed by: Nora Houser MD  Consult ordered by: Jim Marrufo Jr., MD  Reason for consult: hematemesis, GI bleed        Subjective:     HPI:  68 y/o male with alcohol cirrhpsos s/p [place]LTx 07[place] on immunosuppression with Tacrolimus monotherapy and on peritoneal dialysis. He presented to [facility] this morning with complaints of hematemesis. Symptoms began yesterday however hematemesis occurred one time. There was associated presyncopal symptoms therefore he sought medical attention at [facility]. A CT scan was performed and was told no concerning findings. He was given fluids and discharged home. Upon arrival to the ED, he was noted to have evidence of blood in the mouth. Vitals showed he was hypotensive at 71/44. He was bolused fluid and BP improved to 821/51. Labs showed a H&H of 8.1/22.7. His baseline H&H from 12/2021 was 9.9/30.5. He was transfused 2U pRBCs. He was also given Protonix bolus with gtt and Octreotide bolus with gtt. Repeat H&H upon arrival to [facility] ED is 9.3/27.5.    Patient is seen on arrival. He is lucid and communicating. Denies abdominal pain. No NSAIDs. Last EGD pre-transplant in 01/2018. Small varices were appreciated in the esophagus, normal stomach and duodenum. Denies etoh use.           Past Medical History:   Diagnosis Date    Allergy     Anticoagulant long-term use     Arthritis     Back pain     Cellulitis     Congenital heart disease     Hearing loss     right ear    Hepatic  encephalopathy     HTN (hypertension)     Hypertension     diagnosed today (11/25/2015) and prescribed toprol    Leg edema     Nephrolithiasis     JACK (obstructive sleep apnea)     AJCK (obstructive sleep apnea)     Seizures     per Pt last seizure 2018- controlled wit medication    Splenomegaly        Past Surgical History:   Procedure Laterality Date    APPENDECTOMY      Open    BACK SURGERY      CHOLECYSTECTOMY      laprascopic    COLONOSCOPY N/A 1/25/2018    Procedure: COLONOSCOPY;  Surgeon: Lashawn Myles MD;  [facility]  Service: Endoscopy;  Laterality: N/A;    COLONOSCOPY N/A 12/20/2021    Procedure: COLONOSCOPY;  Surgeon: Jesus Grayson MD;  [facility]  Service: Endoscopy;  Laterality: N/A;  start with a pediatric colonoscope and might need the slim pediatric colonoscope available.   priority case per Dr. Grayson-labwork am of procedure  RAPID COVID test coming for > 3 hrs away/ prep ins. [email or web address] / Pt requesting specific date for travel purposes    LIVER TRANSPLANT N/A 7/23/2018    Procedure: TRANSPLANT, LIVER;  Surgeon: Alma Joyce MD;  [facility];  Service: Transplant;  Laterality: N/A;    LUMBAR DISCECTOMY      SPLENIC ARTERY EMBOLIZATION  04/08/2016    Dr Taveras    THORACENTESIS Left 8/3/2020    Procedure: Thoracentesis  U/S notified;  Surgeon: Augustine Rodriguez MD;  Location: [facility];  Service: Pulmonary;  Laterality: Left;    THORACENTESIS Left 4/14/2021    Procedure: Thoracentesis;  Surgeon: Augustine Rodriguez MD;  Location: [facility];  Service: Pulmonary;  Laterality: Left;    TONSILLECTOMY         Review of patient's allergies indicates:   Allergen Reactions    Nsaids (non-steroidal anti-inflammatory drug)      D/t to liver disease.  Other reaction(s): Unknown    Penicillins Other (See Comments)     Tolerated pip-tazo in 8/2016 without issue  Tolerated cefepime 7/2018 without issue  Since  childhood was told not to take it  Other reaction(s): Unknown     Family History       Problem Relation (Age of Onset)    Melanoma Mother          Tobacco Use    Smoking status: Never Smoker    Smokeless tobacco: Former User     Types: Chew     Quit date: 7/29/2018   Substance and Sexual Activity    Alcohol use: No     Alcohol/week: 0.0 standard drinks    Drug use: No    Sexual activity: Not Currently     Review of Systems   Constitutional:  Positive for fatigue.   Gastrointestinal:  Positive for vomiting.   Skin:  Positive for color change.   Neurological:  Positive for weakness and light-headedness.   All other systems reviewed and are negative.  Objective:     Vital Signs (Most Recent):  Temp: 97.6 °F (36.4 °C) (08/23/22 1144)  Pulse: 98 (08/23/22 1446)  Resp: (!) 22 (08/23/22 1446)  BP: (!) 98/57 (08/23/22 1446)  SpO2: 98 % (08/23/22 1446)   Vital Signs (24h Range):  Temp:  [97.6 °F (36.4 °C)] 97.6 °F (36.4 °C)  Pulse:  [] 98  Resp:  [18-22] 22  SpO2:  [98 %-100 %] 98 %  BP: ()/(56-60) 98/57     Weight: 85.3 kg (188 lb 0.8 oz) (08/23/22 1425)  Body mass index is 24.13 kg/m².    No intake or output data in the 24 hours ending 08/23/22 1508    Lines/Drains/Airways       Peripheral Intravenous Line  Duration                  Peripheral IV - Single Lumen 08/23/22 0000 20 G Distal;Left;Posterior Wrist <1 day         Peripheral IV - Single Lumen 08/23/22 0000 20 G Left;Posterior Hand <1 day         Peripheral IV - Single Lumen 08/23/22 1239 18 G Right Antecubital <1 day                    Physical Exam  Constitutional:       Appearance: He is obese. He is ill-appearing.      Comments: Appears older than stated age   Cardiovascular:      Rate and Rhythm: Regular rhythm. Tachycardia present.      Heart sounds: Normal heart sounds.      Comments: Bilateral brawning of the lower extremities with excessive dry skin.   Pulmonary:      Effort: Pulmonary effort is normal.      Breath sounds: Normal breath  sounds.   Abdominal:      General: Abdomen is protuberant. Bowel sounds are normal.      Palpations: Abdomen is soft.      Tenderness: There is no abdominal tenderness.   Musculoskeletal:        Legs:       Comments: Bilteral brawning of the lower extremities. Bilateral onychomycosis of all toe nails.    Skin:     General: Skin is warm and dry.      Coloration: Skin is pale.      Comments: Excessive dry skin of the lower extremities.    Neurological:      General: No focal deficit present.      Mental Status: He is alert and oriented to person, place, and time. Mental status is at baseline.   Psychiatric:         Attention and Perception: Attention normal.         Mood and Affect: Mood normal.         Speech: Speech normal.         Behavior: Behavior normal. Behavior is cooperative.         Thought Content: Thought content normal.         Cognition and Memory: Cognition normal.         Judgment: Judgment normal.       Significant Labs:  CBC:   Recent Labs   Lab 08/23/22  1239   WBC 11.46   HGB 9.3*   HCT 27.5*        CMP:   Recent Labs   Lab 08/23/22  1239   *   CALCIUM 9.0   ALBUMIN 2.6*   PROT 6.1   *   K 4.0   CO2 22*   CL 96   *   CREATININE 6.3*   ALKPHOS 47*   ALT 10   AST 9*   BILITOT 0.6     Coagulation:   Recent Labs   Lab 08/23/22  1239   INR 1.0       Significant Imaging:  Imaging results within the past 24 hours have been reviewed.    Assessment/Plan:     Acute upper GI bleeding  - hypotensive on presentation to local ED  - resuscitated with blood and fluids  - NPO since yesterday  - Protonix gtt and Octreotide gtts administered  - H&H post transfusion 9.3/31  - Last EGD pre-transplant   - no NSAIDs  - ASA last taken yesterday morning.    Liver transplanted  - OLTx 07/2018 for alcohol cirrhosis  - no alcohol for decades  - tacrolimus monotherapy      ESRD on peritoneal dialysis  - attempted peritoneal dialysis before onset of hematemesis this morning but did not  complete.    Recommendations:  1. EGD in ICU   2. Agree with resuscitation efforts  3. Agree with Protonix gtt  4. Unlikely he has varices post transplant, discontinue Octreotide gtt  5. Prograf level    The risks, benefits and alternatives of the procedure were discussed with the patient in detail. This discussion was had in the presence of ED nursing staff. The risks include, risks of adverse reaction to sedation requiring the use of reversal agents, bleeding requiring blood transfusion, perforation requiring surgical intervention and technical failure. Other risks include aspiration leading to respiratory distress and respiratory failure resulting in endotracheal intubation and mechanical ventilation including death. If anesthesia is being utilized for this procedure, it is up to the anesthesiologist to determine airway safety including elective endotracheal intubation. Questions were answered, they agree to proceed. There was no language barriers.     Thank you for your consult. I will follow-up with patient. Please contact us if you have any additional questions.    Nora Houser MD  Gastroenterology  [facility] - Intensive Care ([facility])

## 2022-08-23 NOTE — PHARMACY MED REC
"Admission Medication History     The home medication history was taken by Linwood Whitehead.    You may go to "Admission" then "Reconcile Home Medications" tabs to review and/or act upon these items.      The home medication list has been updated by the Pharmacy department.    Please read ALL comments highlighted in yellow.    Please address this information as you see fit.     Feel free to contact us if you have any questions or require assistance.      Medications listed below were obtained from: Patient/family and Analytic software- [employer]  (Not in a hospital admission)        Linwood Whitehead  [id number]    Current Outpatient Medications on File Prior to Encounter   Medication Sig Dispense Refill Last Dose    aspirin (ECOTRIN) 81 MG EC tablet Take 81 mg by mouth once daily.    8/22/2022 at Unknown time    atorvastatin (LIPITOR) 40 MG tablet Take 40 mg by mouth nightly.   8/22/2022 at Unknown time    B complex-vitamin C-folic acid (STAR-LAURY/NEPHRO-LAURY) 0.8 mg Tab Take 1 tablet by mouth once daily.   8/22/2022 at Unknown time    cyclobenzaprine (FLEXERIL) 5 MG tablet Take 5 mg by mouth 3 (three) times daily as needed.   Past Month at Unknown time    furosemide (LASIX) 40 MG tablet Take 80 mg by mouth 2 (two) times a day.   8/22/2022 at Unknown time    gentamicin (GARAMYCIN) 0.1 % cream Apply topically 2 (two) times daily.   Past Month at Unknown time    levothyroxine (SYNTHROID) 50 MCG tablet Take 50 mcg by mouth once daily.   8/22/2022 at Unknown time    ondansetron (ZOFRAN) 4 MG tablet TAKE 1 TABLET BY MOUTH EVERY 8 HOURS FOR 4 DAYS   Past Week at Unknown time    promethazine (PHENERGAN) 25 MG tablet Take 25 mg by mouth 3 (three) times daily as needed.   Past Month at Unknown time    sevelamer carbonate (RENVELA) 800 mg Tab Take 1,600 mg by mouth 3 (three) times daily with meals.   8/22/2022 at Unknown time    tacrolimus (PROGRAF) 1 MG Cap Take 2 capsules (2 mg total) by mouth every morning " AND 1 capsule (1 mg total) every evening. 90 capsule 11 8/22/2022 at Unknown time    ergocalciferol (VITAMIN D2) 50,000 unit Cap Take 50,000 Units by mouth every 7 days. (Saturdays)   8/20/2022    oxyCODONE (ROXICODONE) 5 MG immediate release tablet Take 5 mg by mouth every 12 (twelve) hours as needed.                            .

## 2022-08-23 NOTE — PLAN OF CARE
Pt Aa0x4, with generalized weakness. Color is pale and slightly jaundiced. Transferred here today for eval of GI bleed and possible intervention.  performed EGD at bedside. Patient tolerated well. Resumed care when pt returned to baseline. Protonix drip infusing at ordered set rate. No nausea or vomiting since arrival to unit. 1 episode of dark tarry stool this afternoon, MD aware. CBC monitored and remain stable. HD started by dialysis RN. Alarms on and appropriate. Bed locked. Call light in place.

## 2022-08-23 NOTE — CONSULTS
..  Alon Adamson is a 67 y.o. male for whom nephrology consult has been requested to evaluate and give opinion.     HPI: 67 year old white male with ESRD since January likely prograf-induced but etiology unknown per wife admitted with GIB; s/p EGD; hx ETOH cirrhosis and HRS requiring liver transplant several years ago; he was never on HD he states; PD started this past January; he does have HTN; I have been asked to see him for his PD/CCPD needs. Meds and labs have been reviewed in detail.    PAST MEDICAL HISTORY:  He  has a past medical history of Allergy, Anticoagulant long-term use, Arthritis, Back pain, Cellulitis, Congenital heart disease, Hearing loss, Hepatic encephalopathy, HTN (hypertension), Hypertension, Leg edema, Nephrolithiasis, JACK (obstructive sleep apnea), JACK (obstructive sleep apnea), Seizures, and Splenomegaly.    PAST SURGICAL HISTORY:  He  has a past surgical history that includes Cholecystectomy; Appendectomy; Tonsillectomy; Lumbar discectomy; Splenic artery embolization (04/08/2016); Back surgery; Colonoscopy (N/A, 1/25/2018); Liver transplant (N/A, 7/23/2018); Thoracentesis (Left, 8/3/2020); Thoracentesis (Left, 4/14/2021); and Colonoscopy (N/A, 12/20/2021).    SOCIAL HISTORY:  He  reports that he has never smoked. He quit smokeless tobacco use about 4 years ago.  His smokeless tobacco use included chew. He reports that he does not drink alcohol and does not use drugs.      FAMILY MEDICAL HISTORY:  His family history includes Melanoma in his mother.    Review of patient's allergies indicates:   Allergen Reactions    Nsaids (non-steroidal anti-inflammatory drug)      D/t to liver disease.  Other reaction(s): Unknown    Penicillins Other (See Comments)     Tolerated pip-tazo in 8/2016 without issue  Tolerated cefepime 7/2018 without issue  Since childhood was told not to take it  Other reaction(s): Unknown        metoclopramide HCl  10 mg Intravenous Once    mupirocin   Nasal BID     [START ON 8/24/2022] senna-docusate 8.6-50 mg  2 tablet Oral Daily       Prior to Admission medications    Medication Sig Start Date End Date Taking? Authorizing Provider   aspirin (ECOTRIN) 81 MG EC tablet Take 81 mg by mouth once daily.  7/23/18  Yes Historical Provider   atorvastatin (LIPITOR) 40 MG tablet Take 40 mg by mouth nightly. 4/27/22  Yes Historical Provider   B complex-vitamin C-folic acid (STAR-LAURY/NEPHRO-LAURY) 0.8 mg Tab Take 1 tablet by mouth once daily. 4/13/22  Yes Historical Provider   cyclobenzaprine (FLEXERIL) 5 MG tablet Take 5 mg by mouth 3 (three) times daily as needed. 1/27/22  Yes Historical Provider   furosemide (LASIX) 40 MG tablet Take 80 mg by mouth 2 (two) times a day. 11/10/20  Yes Historical Provider   gentamicin (GARAMYCIN) 0.1 % cream Apply topically 2 (two) times daily. 5/27/22  Yes Historical Provider   levothyroxine (SYNTHROID) 50 MCG tablet Take 50 mcg by mouth once daily. 8/3/22  Yes Historical Provider   ondansetron (ZOFRAN) 4 MG tablet TAKE 1 TABLET BY MOUTH EVERY 8 HOURS FOR 4 DAYS 4/7/22  Yes Historical Provider   promethazine (PHENERGAN) 25 MG tablet Take 25 mg by mouth 3 (three) times daily as needed. 8/22/22  Yes Historical Provider   sevelamer carbonate (RENVELA) 800 mg Tab Take 1,600 mg by mouth 3 (three) times daily with meals. 7/6/21  Yes Historical Provider   tacrolimus (PROGRAF) 1 MG Cap Take 2 capsules (2 mg total) by mouth every morning AND 1 capsule (1 mg total) every evening. 6/13/22  Yes Miriam García MD   ergocalciferol (VITAMIN D2) 50,000 unit Cap Take 50,000 Units by mouth every 7 days. (Saturdays)    Historical Provider   oxyCODONE (ROXICODONE) 5 MG immediate release tablet Take 5 mg by mouth every 12 (twelve) hours as needed.    Historical Provider        REVIEW OF SYSTEMS:  Patient has no fever, fatigue, visual changes, chest pain, edema, cough, dyspnea, nausea, vomiting, constipation, diarrhea, arthralgias, pruritis, dizziness, weakness,  "depression, confusion.        PHYSICAL EXAM:   height is 6' 2.02" (1.88 m) and weight is 85.3 kg (188 lb 0.8 oz). His temperature is 98 °F (36.7 °C). His blood pressure is 94/52 (abnormal) and his pulse is 103. His respiration is 19 and oxygen saturation is 100%.   Gen: WDWN male in no apparent distress  Psych: Normal mood and affect  Skin: No rashes or ulcers  Eyes: Normal conjunctiva and lids, PERRLA  ENT: Normal hearing with no oropharyngeal lesions  Neck: No JVD  Chest: Clear with no rales, rhonchi, wheezing with normal effort  CV: Regular with no murmurs, gallops or rubs  Abd: Soft, nontender, no distension, positive bowel sounds; exit site clean and dry and stable.   Ext: No cyanosis, clubbing or edema          IMPRESSION AND RECOMMENDATIONS:    ESRD  ESLD s/p liver Transplant on Prograf  HTN  GIB  CAD    Plan: CCPD arranged; his regimen is 2.7 liters/exchange x 4 exchanges; and a last fill with 2 liters until re-connection; all are 2.5% dianeal including the last fill; for simplicity will provide 2.5 liters x 4 exchanges and 2 liters last fill; all 2.5% dianeal; exit site looks clean and dry; meds and labs reviewed; he did have some problems draining last night's session, so I assume he has fluid in his belly at present which will first be drained; following closely with you.     Pj Loco MD  [phone]          Pj Loco MD  "

## 2022-08-23 NOTE — PLAN OF CARE
PD cycler started per p/p and Dr. Loco orders. See pd orders. See pd flow sheet. Tolerating. Reported to primary nurse.

## 2022-08-23 NOTE — SUBJECTIVE & OBJECTIVE
Past Medical History:   Diagnosis Date    Allergy     Anticoagulant long-term use     Arthritis     Back pain     Cellulitis     Congenital heart disease     Hearing loss     right ear    Hepatic encephalopathy     HTN (hypertension)     Hypertension     diagnosed today (11/25/2015) and prescribed toprol    Leg edema     Nephrolithiasis     JACK (obstructive sleep apnea)     JACK (obstructive sleep apnea)     Seizures     per Pt last seizure 2018- controlled wit medication    Splenomegaly        Past Surgical History:   Procedure Laterality Date    APPENDECTOMY      Open    BACK SURGERY      CHOLECYSTECTOMY      laprascopic    COLONOSCOPY N/A 1/25/2018    Procedure: COLONOSCOPY;  Surgeon: Lashawn Myles MD;  [facility]  Service: Endoscopy;  Laterality: N/A;    COLONOSCOPY N/A 12/20/2021    Procedure: COLONOSCOPY;  Surgeon: Jesus Grayson MD;  [facility] ([facility]  Service: Endoscopy;  Laterality: N/A;  start with a pediatric colonoscope and might need the slim pediatric colonoscope available.   priority case per Dr. Grayson-labwork am of procedure  RAPID COVID test coming for > 3 hrs away/ prep ins. [email or web address] / Pt requesting specific date for travel purposes    LIVER TRANSPLANT N/A 7/23/2018    Procedure: TRANSPLANT, LIVER;  Surgeon: Alma Joyce MD;  Location: [facility] OR [address] [place] [address];  Service: Transplant;  Laterality: N/A;    LUMBAR DISCECTOMY      SPLENIC ARTERY EMBOLIZATION  04/08/2016    Dr Taveras    THORACENTESIS Left 8/3/2020    Procedure: Thoracentesis  U/S notified;  Surgeon: Augustine Rodriguez MD;  Location: [facility];  Service: Pulmonary;  Laterality: Left;    THORACENTESIS Left 4/14/2021    Procedure: Thoracentesis;  Surgeon: Augustine Rodriguez MD;  Location: [facility];  Service: Pulmonary;  Laterality: Left;    TONSILLECTOMY         Review of patient's allergies indicates:   Allergen Reactions    Nsaids (non-steroidal anti-inflammatory drug)      D/t to liver  disease.  Other reaction(s): Unknown    Penicillins Other (See Comments)     Tolerated pip-tazo in 8/2016 without issue  Tolerated cefepime 7/2018 without issue  Since childhood was told not to take it  Other reaction(s): Unknown       No current facility-administered medications on file prior to encounter.     Current Outpatient Medications on File Prior to Encounter   Medication Sig    aspirin (ECOTRIN) 81 MG EC tablet Take 81 mg by mouth once daily.     atorvastatin (LIPITOR) 40 MG tablet Take 40 mg by mouth nightly.    B complex-vitamin C-folic acid (STAR-LAURY/NEPHRO-LAURY) 0.8 mg Tab Take 1 tablet by mouth once daily.    cyclobenzaprine (FLEXERIL) 5 MG tablet Take 5 mg by mouth 3 (three) times daily as needed.    furosemide (LASIX) 40 MG tablet Take 80 mg by mouth 2 (two) times a day.    gentamicin (GARAMYCIN) 0.1 % cream Apply topically 2 (two) times daily.    levothyroxine (SYNTHROID) 50 MCG tablet Take 50 mcg by mouth once daily.    ondansetron (ZOFRAN) 4 MG tablet TAKE 1 TABLET BY MOUTH EVERY 8 HOURS FOR 4 DAYS    promethazine (PHENERGAN) 25 MG tablet Take 25 mg by mouth 3 (three) times daily as needed.    sevelamer carbonate (RENVELA) 800 mg Tab Take 1,600 mg by mouth 3 (three) times daily with meals.    tacrolimus (PROGRAF) 1 MG Cap Take 2 capsules (2 mg total) by mouth every morning AND 1 capsule (1 mg total) every evening.    ergocalciferol (VITAMIN D2) 50,000 unit Cap Take 50,000 Units by mouth every 7 days. (Saturdays)    oxyCODONE (ROXICODONE) 5 MG immediate release tablet Take 5 mg by mouth every 12 (twelve) hours as needed.     Family History       Problem Relation (Age of Onset)    Melanoma Mother          Tobacco Use    Smoking status: Never Smoker    Smokeless tobacco: Former User     Types: Chew     Quit date: 7/29/2018   Substance and Sexual Activity    Alcohol use: No     Alcohol/week: 0.0 standard drinks    Drug use: No    Sexual activity: Not Currently     Review of Systems   Constitutional:   Positive for activity change, appetite change and fatigue. Negative for chills and fever.   HENT: Negative.     Eyes: Negative.    Respiratory:  Negative for wheezing.    Gastrointestinal:  Positive for abdominal distention. Negative for abdominal pain, diarrhea, nausea and vomiting.   Endocrine: Negative.    Genitourinary: Negative.    Musculoskeletal:  Positive for back pain and gait problem.   Skin:  Positive for pallor.   Allergic/Immunologic: Negative.    Neurological:  Positive for weakness. Negative for seizures and headaches.   Psychiatric/Behavioral: Negative.     All other systems reviewed and are negative.  Objective:     Vital Signs (Most Recent):  Temp: 97.6 °F (36.4 °C) (08/23/22 1144)  Pulse: 98 (08/23/22 1446)  Resp: (!) 22 (08/23/22 1446)  BP: (!) 98/57 (08/23/22 1446)  SpO2: 98 % (08/23/22 1446)   Vital Signs (24h Range):  Temp:  [97.6 °F (36.4 °C)] 97.6 °F (36.4 °C)  Pulse:  [] 98  Resp:  [18-22] 22  SpO2:  [98 %-100 %] 98 %  BP: ()/(56-60) 98/57     Weight: 85.3 kg (188 lb 0.8 oz)  Body mass index is 24.13 kg/m².    Physical Exam  Vitals and nursing note reviewed.   Constitutional:       Appearance: Normal appearance.   HENT:      Head: Normocephalic and atraumatic.      Nose: Nose normal.   Eyes:      Extraocular Movements: Extraocular movements intact.      Pupils: Pupils are equal, round, and reactive to light.   Cardiovascular:      Rate and Rhythm: Normal rate and regular rhythm.   Pulmonary:      Effort: Pulmonary effort is normal. No respiratory distress.      Comments: Symmetric chest rise.  Abdominal:      General: Abdomen is flat. There is no distension.      Palpations: Abdomen is soft.   Musculoskeletal:         General: No deformity or signs of injury.      Right lower leg: No edema.      Left lower leg: No edema.   Skin:     General: Skin is warm and dry.   Neurological:      General: No focal deficit present.      Mental Status: He is alert and oriented to person,  place, and time.         CRANIAL NERVES     CN III, IV, VI   Pupils are equal, round, and reactive to light.  Results for orders placed or performed during the hospital encounter of 08/23/22   CBC auto differential   Result Value Ref Range    WBC 11.46 3.90 - 12.70 K/uL    RBC 2.97 (L) 4.60 - 6.20 M/uL    Hemoglobin 9.3 (L) 14.0 - 18.0 g/dL    Hematocrit 27.5 (L) 40.0 - 54.0 %    MCV 93 82 - 98 fL    MCH 31.3 (H) 27.0 - 31.0 pg    MCHC 33.8 32.0 - 36.0 g/dL    RDW 15.8 (H) 11.5 - 14.5 %    Platelets 182 150 - 450 K/uL    MPV 8.9 (L) 9.2 - 12.9 fL    Immature Granulocytes 1.4 (H) 0.0 - 0.5 %    Gran # (ANC) 9.8 (H) 1.8 - 7.7 K/uL    Immature Grans (Abs) 0.16 (H) 0.00 - 0.04 K/uL    Lymph # 0.5 (L) 1.0 - 4.8 K/uL    Mono # 0.9 0.3 - 1.0 K/uL    Eos # 0.0 0.0 - 0.5 K/uL    Baso # 0.04 0.00 - 0.20 K/uL    nRBC 0 0 /100 WBC    Gran % 85.6 (H) 38.0 - 73.0 %    Lymph % 4.7 (L) 18.0 - 48.0 %    Mono % 7.8 4.0 - 15.0 %    Eosinophil % 0.2 0.0 - 8.0 %    Basophil % 0.3 0.0 - 1.9 %    Differential Method Automated    Comprehensive metabolic panel   Result Value Ref Range    Sodium 135 (L) 136 - 145 mmol/L    Potassium 4.0 3.5 - 5.1 mmol/L    Chloride 96 95 - 110 mmol/L    CO2 22 (L) 23 - 29 mmol/L    Glucose 256 (H) 70 - 110 mg/dL     (H) 8 - 23 mg/dL    Creatinine 6.3 (H) 0.5 - 1.4 mg/dL    Calcium 9.0 8.7 - 10.5 mg/dL    Total Protein 6.1 6.0 - 8.4 g/dL    Albumin 2.6 (L) 3.5 - 5.2 g/dL    Total Bilirubin 0.6 0.1 - 1.0 mg/dL    Alkaline Phosphatase 47 (L) 55 - 135 U/L    AST 9 (L) 10 - 40 U/L    ALT 10 10 - 44 U/L    Anion Gap 17 (H) 8 - 16 mmol/L    eGFR 9 (A) >60 mL/min/1.73 m^2   Protime-INR   Result Value Ref Range    Prothrombin Time 11.2 9.0 - 12.5 sec    INR 1.0 0.8 - 1.2   POCT COVID-19 Rapid Screening   Result Value Ref Range    POC Rapid COVID Negative Negative     Acceptable Yes    Type & Screen   Result Value Ref Range    Group & Rh A POS     Indirect Amaya NEG      *Note: Due to a large  number of results and/or encounters for the requested time period, some results have not been displayed. A complete set of results can be found in Results Review.      Significant Labs: All pertinent labs within the past 24 hours have been reviewed.    Significant Imaging: I have reviewed all pertinent imaging results/findings within the past 24 hours.

## 2022-08-23 NOTE — CONSULTS
[facility] - Emergency Dept.  Critical Care Medicine  Consult Note    Patient Name: Alon Adamson  MRN: [id number]  Admission Date: 8/23/2022  Hospital Length of Stay: 0 days  Code Status: Full Code  Attending Physician: Ector Nelson MD   Primary Care Provider: Bruno Oconnor NP   Principal Problem: <principal problem not specified>    Inpatient consult to Critical Care Medicine  Consult performed by: Wu Christine MD  Consult ordered by: Ector Nelson MD        Subjective:     HPI:  67M pmh EtOH cirrhosis s/p  liver transplant 2018 (on tacrolimus),  thrombocytopenia, CAD, HTN,, , DM, recurrent L pleural effusion (s/p prior thoracenteses), ESRD on peritoneal dialysis, JACK presented to [facility] in [place], [place] the 8/23/22 morning with hematemesis. Now transferred to [facility] afternoon of 8/23/22for GI evaluation and care.    Hematemesis started yesterday 8/22/22.  Patient takes ASA daily, but no other anticoagulants.  He went to another ED and was discharged.  Hematemesis returned this morning and he went to [facility] ED.  BP was soft and pt was given fluids and 2un RBCs.  He was given a Protonix bolus and drip.  However, the patient became nauseous and the protonix was stopped per report.  He was also started on octreotide.     When pt arrived to [facility] ED, labs were repeated.  Hgb 9.3, Plt 182.           Hospital/ICU Course:  8/23:  Transferred from [place], [place].  GI consult.  Serial CBC.  Stop octreotide.  Resume protonix.      Past Medical History:   Diagnosis Date    Allergy     Anticoagulant long-term use     Arthritis     Back pain     Cellulitis     Congenital heart disease     Hearing loss     right ear    Hepatic encephalopathy     Hypertension     diagnosed today (11/25/2015) and prescribed toprol    Leg edema     Nephrolithiasis     JACK (obstructive sleep apnea)     Seizures     per Pt last seizure 2018- controlled wit medication    Splenomegaly        Past  Surgical History:   Procedure Laterality Date    APPENDECTOMY      Open    BACK SURGERY      CHOLECYSTECTOMY      laprascopic    COLONOSCOPY N/A 1/25/2018    Procedure: COLONOSCOPY;  Surgeon: Lashawn Myles MD;  [facility]  Service: Endoscopy;  Laterality: N/A;    COLONOSCOPY N/A 12/20/2021    Procedure: COLONOSCOPY;  Surgeon: Jesus Grayson MD;  [facility]  Service: Endoscopy;  Laterality: N/A;  start with a pediatric colonoscope and might need the slim pediatric colonoscope available.   priority case per Dr. Grayson-labwork am of procedure  RAPID COVID test coming for > 3 hrs away/ prep ins. [email or web address] / Pt requesting specific date for travel purposes    LIVER TRANSPLANT N/A 7/23/2018    Procedure: TRANSPLANT, LIVER;  Surgeon: Alma Joyce MD;  Location: [facility] OR [address] [place] [address];  Service: Transplant;  Laterality: N/A;    LUMBAR DISCECTOMY      SPLENIC ARTERY EMBOLIZATION  04/08/2016    Dr Taveras    THORACENTESIS Left 8/3/2020    Procedure: Thoracentesis  U/S notified;  Surgeon: Augustine Rodriguez MD;  Location: [facility];  Service: Pulmonary;  Laterality: Left;    THORACENTESIS Left 4/14/2021    Procedure: Thoracentesis;  Surgeon: Augustine Rodriguez MD;  Location: [facility];  Service: Pulmonary;  Laterality: Left;    TONSILLECTOMY         Review of patient's allergies indicates:   Allergen Reactions    Nsaids (non-steroidal anti-inflammatory drug)      D/t to liver disease.  Other reaction(s): Unknown    Penicillins Other (See Comments)     Tolerated pip-tazo in 8/2016 without issue  Tolerated cefepime 7/2018 without issue  Since childhood was told not to take it  Other reaction(s): Unknown       Family History       Problem Relation (Age of Onset)    Melanoma Mother          Tobacco Use    Smoking status: Never Smoker    Smokeless tobacco: Former User     Types: Chew     Quit date: 7/29/2018   Substance and Sexual Activity    Alcohol use: No     Alcohol/week:  0.0 standard drinks    Drug use: No    Sexual activity: Not Currently         Review of Systems   Constitutional:  Negative for chills, fatigue and fever.   Respiratory:  Negative for cough, shortness of breath and wheezing.    Gastrointestinal:  Negative for abdominal pain, diarrhea, nausea and vomiting.   Neurological:  Negative for seizures, weakness and headaches.   All other systems reviewed and are negative.  Objective:     Vital Signs (Most Recent):  Temp: 97.6 °F (36.4 °C) (08/23/22 1144)  Pulse: 101 (08/23/22 1313)  Resp: 20 (08/23/22 1313)  BP: 91/60 (08/23/22 1313)  SpO2: 100 % (08/23/22 1313) Vital Signs (24h Range):  Temp:  [97.6 °F (36.4 °C)] 97.6 °F (36.4 °C)  Pulse:  [] 101  Resp:  [18-20] 20  SpO2:  [99 %-100 %] 100 %  BP: ()/(56-60) 91/60     Weight: 85.3 kg (188 lb 0.8 oz)  Body mass index is 24.14 kg/m².    No intake or output data in the 24 hours ending 08/23/22 1325    Physical Exam  Vitals reviewed.   Constitutional:       Appearance: Normal appearance.   HENT:      Head: Normocephalic and atraumatic.      Nose: Nose normal.   Eyes:      Extraocular Movements: Extraocular movements intact.      Pupils: Pupils are equal, round, and reactive to light.   Cardiovascular:      Rate and Rhythm: Normal rate and regular rhythm.   Pulmonary:      Effort: Pulmonary effort is normal. No respiratory distress.      Comments: Symmetric chest rise.  Abdominal:      General: Abdomen is flat. There is no distension.      Palpations: Abdomen is soft.   Musculoskeletal:         General: No deformity or signs of injury.      Right lower leg: No edema.      Left lower leg: No edema.   Skin:     General: Skin is warm and dry.   Neurological:      General: No focal deficit present.      Mental Status: He is alert and oriented to person, place, and time.       Vents:       Lines/Drains/Airways       Peripheral Intravenous Line  Duration                  Peripheral IV - Single Lumen 08/23/22 0000 20 G  Distal;Left;Posterior Wrist <1 day         Peripheral IV - Single Lumen 08/23/22 0000 20 G Left;Posterior Hand <1 day         Peripheral IV - Single Lumen 08/23/22 1239 18 G Right Antecubital <1 day                    Significant Labs:    CBC/Anemia Profile:  Recent Labs   Lab 08/23/22  1239   WBC 11.46   HGB 9.3*   HCT 27.5*      MCV 93   RDW 15.8*        Chemistries:  No results for input(s): NA, K, CL, CO2, BUN, CREATININE, CALCIUM, ALBUMIN, PROT, BILITOT, ALKPHOS, ALT, AST, GLUCOSE, MG, PHOS in the last 48 hours.    A1C: No results for input(s): HGBA1C in the last 48 hours.  Bilirubin: No results for input(s): BILIDIR, BILIRUBINTOT, BILITOT in the last 720 hours.  Coagulation: No results for input(s): PT, INR, APTT in the last 48 hours.  Lactic Acid: No results for input(s): LACTATE in the last 48 hours.  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  Respiratory Culture: No results for input(s): GSRESP, RESPIRATORYC in the last 48 hours.  Troponin: No results for input(s): TROPONINI in the last 48 hours.  Urine Culture: No results for input(s): LABURIN in the last 48 hours.  Urine Studies: No results for input(s): COLORU, APPEARANCEUA, PHUR, SPECGRAV, PROTEINUA, GLUCUA, KETONESU, BILIRUBINUA, OCCULTUA, NITRITE, UROBILINOGEN, LEUKOCYTESUR, RBCUA, WBCUA, BACTERIA, SQUAMEPITHEL, HYALINECASTS in the last 48 hours.    Invalid input(s): WRIGHTSUR    Significant Imaging:   I have reviewed all pertinent imaging results/findings within the past 24 hours.      ABG  No results  for input(s): PH, PO2, PCO2, HCO3, BE in the last 168 hours.  Assessment/Plan:     Cardiac/Vascular  Coronary artery disease involving native coronary artery of native heart without angina pectoris  Hold ASA in setting of GIB      Endocrine  DM2 (diabetes mellitus, type 2)  subcu insulin    GI  Liver transplanted  2/2 remote EtOH abuse.  On tacrolimus  GI consult to manage anti-rejection meds  Outpt GI is Dr. García at [facility]    GI bleed  Hematemesis.  PPI drip.  Reported that pt does not have cirrhosis post-transplant.  Does not need octreotide.  Serial CBC  Transfuse as appropriate for goal Hgb > 7, active bleeding, goal Plt > 50, Goal INR <= 1.6  GI consult    Other  JACK (obstructive sleep apnea)  CPAP      Critical Care Daily Checklist:    A: Awake: RASS Goal/Actual Goal:    Actual:     B: Spontaneous Breathing Trial Performed?     C: SAT & SBT Coordinated?  Not intubated                  D: Delirium: CAM-ICU     E: Early Mobility Performed? No, will order PT/OT for tomorrow   F: Feeding Goal:    Status:     Current Diet Order   No orders of the defined types were placed in this encounter.      AS: Analgesia/Sedation No sedation at this time.     T: Thromboembolic Prophylaxis SCDs.  No pharm ppx   H: HOB > 300 Yes   U: Stress Ulcer Prophylaxis (if needed) PPI drip   G: Glucose Control Insulin subcu   B: Bowel Function     I: Indwelling Catheter (Lines & [place]) Necessity    D: De-escalation of Antimicrobials/Pharmacotherapies No abx at this time.    Plan for the day/ETD GI consult  Likely EGD  PPI drip  Serial blood counts    Code Status:  Family/Goals of Care: Full Code  Updated pt and his wife at bedside     Critical Care Time: 60 minutes  Critical secondary to Patient has a condition that poses threat to life and bodily function: GI bleed in setting liver transplant and ESRD requiring PPI drip, endoscopy and intensive monitoring of blood counts and hemodynamics.     Critical care was time spent personally  by me on the following activities: development of treatment plan with patient or surrogate and bedside caregivers, discussions with consultants, evaluation of patient's response to treatment, examination of patient, ordering and performing treatments and interventions, ordering and review of laboratory studies, ordering and review of radiographic studies, pulse oximetry, re-evaluation of patient's condition. This critical care time did not overlap with that of any other provider or involve time for any procedures.    Thank you for your consult.      Wu Christine MD  Critical Care Medicine  [facility]

## 2022-08-23 NOTE — INTERVAL H&P NOTE
The patient has been examined and the H&P has been reviewed:    I concur with the findings and no changes have occurred since H&P was written.    Anesthesia/Surgery risks, benefits and alternative options discussed and understood by patient/family.          Active Hospital Problems    Diagnosis  POA    Acute upper GI bleeding [K92.2]  Yes     Priority: 1 - High    Liver transplanted [Z94.4]  Not Applicable     Priority: 2     ESRD on peritoneal dialysis [N18.6, Z99.2]  Not Applicable     Priority: 3     JACK (obstructive sleep apnea) [G47.33]  Unknown    Hypotension due to hypovolemia [I95.89, E86.1]  Yes    DM2 (diabetes mellitus, type 2) [E11.9]  Unknown    Acute blood loss anemia (ABLA) [D62]  Yes    Coronary artery disease involving native coronary artery of native heart without angina pectoris [I25.10]  Yes      Resolved Hospital Problems   No resolved problems to display.

## 2022-08-23 NOTE — PROVATION PATIENT INSTRUCTIONS
Discharge Summary/Instructions after an Endoscopic Procedure  Patient Name: Alon Adamson  Patient MRN: [id number]  Patient YOB: 1955  Tuesday, August 23, 2022 Nora Houser MD  Dear patient,  As a result of recent federal legislation (The Federal Cures Act), you may   receive lab or pathology results from your procedure in your [facility]   account before your physician is able to contact you. Your physician or   their representative will relay the results to you with their   recommendations at their soonest availability.  Thank you,  RESTRICTIONS:  During your procedure today, you received medications for sedation.  These   medications may affect your judgment, balance and coordination.  Therefore,   for 24 hours, you have the following restrictions:   - DO NOT drive a car, operate machinery, make legal/financial decisions,   sign important papers or drink alcohol.    ACTIVITY:  Today: no heavy lifting, straining or running due to procedural   sedation/anesthesia.  The following day: return to full activity including work.  DIET:  Eat and drink normally unless instructed otherwise.     TREATMENT FOR COMMON SIDE EFFECTS:  - Mild abdominal pain, nausea, belching, bloating or excessive gas:  rest,   eat lightly and use a heating pad.  - Sore Throat: treat with throat lozenges and/or gargle with warm salt   water.  - Because air was used during the procedure, expelling large amounts of air   from your rectum or belching is normal.  - If a bowel prep was taken, you may not have a bowel movement for 1-3 days.    This is normal.  SYMPTOMS TO WATCH FOR AND REPORT TO YOUR PHYSICIAN:  1. Abdominal pain or bloating, other than gas cramps.  2. Chest pain.  3. Back pain.  4. Signs of infection such as: chills or fever occurring within 24 hours   after the procedure.  5. Rectal bleeding, which would show as bright red, maroon, or black stools.   (A tablespoon of blood from the rectum is not serious, especially if    hemorrhoids are present.)  6. Vomiting.  7. Weakness or dizziness.  GO DIRECTLY TO THE NEAREST EMERGENCY ROOM IF YOU HAVE ANY OF THE FOLLOWING:      Difficulty breathing              Chills and/or fever over 101 F   Persistent vomiting and/or vomiting blood   Severe abdominal pain   Severe chest pain   Black, tarry stools   Bleeding- more than one tablespoon   Any other symptom or condition that you feel may need urgent attention  Your doctor recommends these additional instructions:  If any biopsies were taken, your doctors clinic will contact you in 1 to 2   weeks with any results.  - Return patient to ICU for ongoing care.   - Clear liquid diet.   - Hold ASA  - Refer to an interventional radiologist if patient rebleeds.  - Check hemogram with white blood cell count and platelets STAT.   - The findings and recommendations were discussed with the patient's family.     - The findings and recommendations were discussed with the referring   physician, Dr. Christine, ICU.   For questions, problems or results please call your physician Nora Houser MD at Work:  [phone]  If you have any questions about the above instructions, call the GI   department at [phone] or call the endoscopy unit at [phone]   from 7am until 3 pm.  [facility], EMERGENCY ROOM PHONE NUMBER:   [phone]  IF A COMPLICATION OR EMERGENCY SITUATION ARISES AND YOU ARE UNABLE TO REACH   YOUR PHYSICIAN - GO DIRECTLY TO THE EMERGENCY ROOM.  I have read or have had read to me these discharge instructions for my   procedure and have received a written copy.  I understand these   instructions and will follow-up with my physician if I have any questions.     __________________________________       _____________________________________  Nurse Signature                                          Patient/Designated   Responsible Party Signature  MD Nora Nieves MD  8/23/2022 5:04:42 PM  This  report has been verified and signed electronically.  Dear patient,  As a result of recent federal legislation (The Federal Cures Act), you may   receive lab or pathology results from your procedure in your [facility]   account before your physician is able to contact you. Your physician or   their representative will relay the results to you with their   recommendations at their soonest availability.  Thank you,  PROVATION

## 2022-08-23 NOTE — ASSESSMENT & PLAN NOTE
2/2 remote EtOH abuse.  On tacrolimus as outpt  tacrolimus level pending  GI consult to manage anti-rejection meds  Outpt GI is Dr. García at [facility]

## 2022-08-23 NOTE — ANESTHESIA PREPROCEDURE EVALUATION
08/23/2022  Alon Adamson is a 67 y.o., male.      Pre-op Assessment    I have reviewed the Patient Summary Reports.     I have reviewed the Nursing Notes. I have reviewed the NPO Status.   I have reviewed the Medications.     Review of Systems  Anesthesia Hx:  No problems with previous Anesthesia  History of prior surgery of interest to airway management or planning: liver transplant. Previous anesthesia: General Denies Family Hx of Anesthesia complications.   Denies Personal Hx of Anesthesia complications.   Social:  Former Smoker    Cardiovascular:   Hypertension CAD asymptomatic     Pulmonary:   Denies Sleep Apnea. Hx of sleep apnea was on CPAP. JACK now resolved    Renal/:   Chronic Renal Disease (Plans to start peritoneal dialysis in Jan 2022), CRI     Hepatic/GI:   Liver Disease, S/p liver transplant    Neurological:   Hx Of seizure vs hepatic encephalopathy. No longer taking keppra. No subsequent episodes after liver transplant    Endocrine:   DMII previously. Now resolved        Physical Exam  General: Well nourished, Alert, Oriented and Cooperative    Airway:  Mallampati: II   Mouth Opening: Normal  TM Distance: Normal  Tongue: Normal  Neck ROM: Normal ROM    Dental:  Intact    Chest/Lungs:  Clear to auscultation, Normal Respiratory Rate    Heart:  Rate: Normal    Abdomen:  Normal        Anesthesia Plan  Type of Anesthesia, risks & benefits discussed:    Anesthesia Type: MAC  Intra-op Monitoring Plan: Standard ASA Monitors  Induction:  IV  Informed Consent: Informed consent signed with the Patient and all parties understand the risks and agree with anesthesia plan.  All questions answered.   ASA Score: 4  Day of Surgery Review of History & Physical: I have interviewed and examined the patient. I have reviewed the patient's H&P dated:     Ready For Surgery From Anesthesia Perspective.      .

## 2022-08-23 NOTE — ANESTHESIA POSTPROCEDURE EVALUATION
Anesthesia Post Evaluation    Patient: Alon Adamson    Procedure(s) Performed: Procedure(s) (LRB):  EGD (ESOPHAGOGASTRODUODENOSCOPY) (N/A)    Final Anesthesia Type: MAC      Patient location during evaluation: ICU  Patient participation: Yes- Able to Participate  Level of consciousness: awake and alert  Post-procedure vital signs: reviewed and stable  Pain management: adequate  Airway patency: patent    PONV status at discharge: No PONV  Anesthetic complications: no      Cardiovascular status: stable  Respiratory status: unassisted and spontaneous ventilation  Hydration status: euvolemic  Follow-up not needed.          Vitals Value Taken Time   /79 08/23/22 1631   Temp  08/23/22 1632   Pulse 107 08/23/22 1632   Resp 30 08/23/22 1632   SpO2 100 % 08/23/22 1632   Vitals shown include unvalidated device data.      No case tracking events are documented in the log.      Pain/Ray Score: No data recorded

## 2022-08-23 NOTE — PROVIDER TRANSFER
Outside Transfer Acceptance Note / Regional Referral Center    Referring facility: [facility]  Referring provider: EAGLE, TARSHA RAWLS  Accepting facility: [facility]  Accepting provider: MARÍA ROUSSEAU  Reason for transfer: Higher Level of Care   Transfer diagnosis: gi bleed  Transfer specialty requested: Hospital Medicine  Gastroenterology  Transfer specialty notified: yes  Transfer level: NUMBER 1-5: 1  Bed type requested: ED to ED  Isolation status: No active isolations   Admission class or status: Emergency      Narrative     67-year-old male with history of alcoholic cirrhosis with liver transplant in 2018 (on tacrolimus), coronary artery disease, hypertension, thrombocytopenia, diabetes, recurrent/persistent left pleural effusion (status post prior thoracenteses), end-stage renal disease on peritoneal dialysis, anemia (hemoglobin 9.1 in April 2022), hypertension, and sleep apnea presented to [facility] in [place] on the morning of August 23 with hematemesis.  He is on aspirin but no other anticoagulation by report.  Episodes of hematemesis started the day prior, and he was seen at a different emergency department.  Patient said he was told he had diverticulitis and was sent home on oral antibiotics. He noted having additional hematemesis on the morning of August 23.  He had 1 episode of dark bloody emesis in the emergency department.  He is awake and alert with no alteration in mentation.  Has no airway compromise.  Initial systolic blood pressures were in the 70s, and he received a small bolus of normal saline and was started on emergency release blood transfusion.  First unit of packed red blood cells is in process, and they are planning a 2nd unit.  He also was given 80 mg Protonix and started on Protonix infusion.  He had nausea with initiation of the Protonix infusion, and it was held. They are  initiating octreotide bolus and infusion.  Blood pressure improved with pRBCs.  Case discussed with the referring ED provider and with Gastroenterology at [facility].  Will plan transfer ED to ED from the outside facility to [facility].  Will likely need EGD on arrival.  Requesting admission by Hospital Medicine once he arrives and is stabilized for likely ICU treatment of upper GI bleed.  He will transfer with Levophed available, but his most recent BP was 105/59 with the packed red blood cell infusion.  He has 2 large-bore IVs in place.    August 23:  COVID negative, INR 1.03, hemoglobin 8.1, hematocrit 22.7, platelets 250    Bib[place] 3, 2022: Lexiscan cardiac stress test noted normal myocardial perfusion scan.  No evidence of ischemia or infarction.  Moderate intensity perfusion abnormality in the wall of the left ventricle consistent with RV insertion site.  Mild global hypokinesis at rest and following stress.  -echocardiogram with EF 60%, small posterior lateral pericardial effusion, normal LV diastolic function, abnormal septal wall motion.    Objective     Vitals:   Pulse 114, blood pressure 105/59, O2 sats 100%  Allergies:   Review of patient's allergies indicates:   Allergen Reactions    Nsaids (non-steroidal anti-inflammatory drug)      D/t to liver disease.  Other reaction(s): Unknown    Penicillins Other (See Comments)     Tolerated pip-tazo in 8/2016 without issue  Tolerated cefepime 7/2018 without issue  Since childhood was told not to take it  Other reaction(s): Unknown      NPO: No      Anticoagulation:   Anticoagulants     None           Instructions    1. Transfer ED to ED  2. Notify Gastroenterology on arrival  3. Notify Hospital Medicine on arrival      Upon patient arrival, please contact Gastroenterology and Hospital Medicine on call.     JANELLE Douglas MD  Hospital Medicine Staff  Cell: [phone]

## 2022-08-24 NOTE — PLAN OF CARE
Peritoneal dialysis: 2.5% used    Uncomplicated/complicated: stable    Ultrafiltration volume: 1194cc net    Notes: Tolerated, Disconnected per p/p, No issues reported.

## 2022-08-24 NOTE — ASSESSMENT & PLAN NOTE
- OLTx 07/2018 for alcohol cirrhosis  - no alcohol for decades  - tacrolimus monotherapy. Monitor levels daily.

## 2022-08-24 NOTE — PROGRESS NOTES
[facility] ([facility])  Gastroenterology  Progress Note    Patient Name: Alon Adamson  MRN: [id number]  Admission Date: 8/23/2022  Hospital Length of Stay: 1 days  Code Status: Full Code   Attending Provider: Ector Nelson MD  Consulting Provider: Aneesh Alas PA-C  Primary Care Physician: Bruno Oconnor NP  Principal Problem: <principal problem not specified>      Subjective:     Interval History: The patient has no complaints today. No overt bleeding.      Review of Systems   Constitutional:         See Interval History for daily ROS.    Objective:     Vital Signs (Most Recent):  Temp: 98.4 °F (36.9 °C) (08/24/22 0715)  Pulse: 103 (08/24/22 0815)  Resp: (!) 22 (08/24/22 0815)  BP: (!) 98/57 (08/24/22 0815)  SpO2: 97 % (08/24/22 0815)   Vital Signs (24h Range):  Temp:  [97.6 °F (36.4 °C)-98.4 °F (36.9 °C)] 98.4 °F (36.9 °C)  Pulse:  [] 103  Resp:  [16-33] 22  SpO2:  [97 %-100 %] 97 %  BP: ()/(50-70) 98/57     Weight: 85.3 kg (188 lb 0.8 oz) (08/23/22 1425)  Body mass index is 24.13 kg/m².      Intake/Output Summary (Last 24 hours) at 8/24/2022 0900  Last data filed at 8/24/2022 0845  Gross per 24 hour   Intake 913.29 ml   Output 1314 ml   Net -400.71 ml       Lines/Drains/Airways       Peripheral Intravenous Line  Duration                  Peripheral IV - Single Lumen 08/23/22 0000 20 G Distal;Left;Posterior Wrist 1 day         Peripheral IV - Single Lumen 08/23/22 0000 20 G Left;Posterior Hand 1 day         Peripheral IV - Single Lumen 08/23/22 1239 18 G Right Antecubital <1 day                    Physical Exam  Constitutional:       General: He is not in acute distress.     Appearance: He is well-developed. He is not diaphoretic.   HENT:      Head: Normocephalic and atraumatic.   Eyes:      Extraocular Movements: Extraocular movements intact.   Cardiovascular:      Rate and Rhythm: Normal rate and regular rhythm.   Pulmonary:      Effort: Pulmonary effort is normal. No  respiratory distress.      Breath sounds: Normal breath sounds. No wheezing.   Abdominal:      General: Bowel sounds are normal. There is no distension.      Palpations: Abdomen is soft.      Tenderness: There is no abdominal tenderness.   Neurological:      Mental Status: He is alert and oriented to person, place, and time.      Cranial Nerves: No cranial nerve deficit.   Psychiatric:         Behavior: Behavior normal.       Significant Labs:  CBC:   Recent Labs   Lab 08/23/22  1812 08/24/22  0019 08/24/22  0535   WBC 14.84* 8.85 8.22   HGB 9.0* 8.6* 8.4*   HCT 27.4* 25.1* 26.0*    179 179     CMP:   Recent Labs   Lab 08/23/22  1239 08/24/22  0535   * 224*   CALCIUM 9.0 8.3*   ALBUMIN 2.6*  --    PROT 6.1  --    * 135*   K 4.0 3.5   CO2 22* 21*   CL 96 97   * 106*   CREATININE 6.3* 6.5*   ALKPHOS 47*  --    ALT 10  --    AST 9*  --    BILITOT 0.6  --          Significant Imaging:  Imaging results within the past 24 hours have been reviewed.    Assessment/Plan:     Acute upper GI bleeding  S/p EGD. Gastric ulcer with oozing vessel treated.    Continue Protonix drip. - hypotensive on presentation to local ED.   Agree with clear liquid diet.   Avoid NSAIDs.  Monitor for signs of ongoing bleeding.     ESRD on peritoneal dialysis  Dialysis management by Nephrology.     Liver transplanted  - OLTx 07/2018 for alcohol cirrhosis  - no alcohol for decades  - tacrolimus monotherapy. Monitor levels daily.       Acute blood loss anemia (ABLA)  Hgb down slightly overnight. Continue to monitor H/H and transfuse as indicated.         Thank you for your consult. I will follow-up with patient. Please contact us if you have any additional questions.    Aneesh Alas PA-C  Gastroenterology  [facility] ([facility])

## 2022-08-24 NOTE — ASSESSMENT & PLAN NOTE
Patient's FSGs are controlled on current medication regimen.  Last A1c reviewed-   Lab Results   Component Value Date    HGBA1C 5.6 08/23/2022     Most recent fingerstick glucose reviewed-   Recent Labs   Lab 08/23/22  2114 08/24/22  0132 08/24/22  0533 08/24/22  1001   POCTGLUCOSE 297* 262* 221* 171*     Current correctional scale  Low  Maintain anti-hyperglycemic dose as follows-   Antihyperglycemics (From admission, onward)            Start     Stop Route Frequency Ordered    08/24/22 1015  insulin detemir U-100 pen 2 Units         -- SubQ 2 times daily 08/24/22 0900    08/23/22 1500  insulin aspart U-100 pen 1-10 Units         -- SubQ Every 4 hours PRN 08/23/22 1401        Hold Oral hypoglycemics while patient is in the hospital.

## 2022-08-24 NOTE — SUBJECTIVE & OBJECTIVE
Subjective:     Interval History: The patient has no complaints today. No overt bleeding.      Review of Systems   Constitutional:         See Interval History for daily ROS.    Objective:     Vital Signs (Most Recent):  Temp: 98.4 °F (36.9 °C) (08/24/22 0715)  Pulse: 103 (08/24/22 0815)  Resp: (!) 22 (08/24/22 0815)  BP: (!) 98/57 (08/24/22 0815)  SpO2: 97 % (08/24/22 0815)   Vital Signs (24h Range):  Temp:  [97.6 °F (36.4 °C)-98.4 °F (36.9 °C)] 98.4 °F (36.9 °C)  Pulse:  [] 103  Resp:  [16-33] 22  SpO2:  [97 %-100 %] 97 %  BP: ()/(50-70) 98/57     Weight: 85.3 kg (188 lb 0.8 oz) (08/23/22 1425)  Body mass index is 24.13 kg/m².      Intake/Output Summary (Last 24 hours) at 8/24/2022 0900  Last data filed at 8/24/2022 0845  Gross per 24 hour   Intake 913.29 ml   Output 1314 ml   Net -400.71 ml       Lines/Drains/Airways       Peripheral Intravenous Line  Duration                  Peripheral IV - Single Lumen 08/23/22 0000 20 G Distal;Left;Posterior Wrist 1 day         Peripheral IV - Single Lumen 08/23/22 0000 20 G Left;Posterior Hand 1 day         Peripheral IV - Single Lumen 08/23/22 1239 18 G Right Antecubital <1 day                    Physical Exam  Constitutional:       General: He is not in acute distress.     Appearance: He is well-developed. He is not diaphoretic.   HENT:      Head: Normocephalic and atraumatic.   Eyes:      Extraocular Movements: Extraocular movements intact.   Cardiovascular:      Rate and Rhythm: Normal rate and regular rhythm.   Pulmonary:      Effort: Pulmonary effort is normal. No respiratory distress.      Breath sounds: Normal breath sounds. No wheezing.   Abdominal:      General: Bowel sounds are normal. There is no distension.      Palpations: Abdomen is soft.      Tenderness: There is no abdominal tenderness.   Neurological:      Mental Status: He is alert and oriented to person, place, and time.      Cranial Nerves: No cranial nerve deficit.   Psychiatric:          Behavior: Behavior normal.       Significant Labs:  CBC:   Recent Labs   Lab 08/23/22  1812 08/24/22  0019 08/24/22  0535   WBC 14.84* 8.85 8.22   HGB 9.0* 8.6* 8.4*   HCT 27.4* 25.1* 26.0*    179 179     CMP:   Recent Labs   Lab 08/23/22  1239 08/24/22  0535   * 224*   CALCIUM 9.0 8.3*   ALBUMIN 2.6*  --    PROT 6.1  --    * 135*   K 4.0 3.5   CO2 22* 21*   CL 96 97   * 106*   CREATININE 6.3* 6.5*   ALKPHOS 47*  --    ALT 10  --    AST 9*  --    BILITOT 0.6  --          Significant Imaging:  Imaging results within the past 24 hours have been reviewed.

## 2022-08-24 NOTE — PLAN OF CARE
Pt AAOx4. VSS. H&H stable. No signs of bleeding this shift. PD infusing. Remains on protonix drip. Voided 70ml this shift. No other significant changes this shift. Bed low and locked. Call light within reach. Bed alarm on. Will continue to monitor.

## 2022-08-24 NOTE — SUBJECTIVE & OBJECTIVE
Interval History: appreciate Dr. Houser, looks and feels much better, rested well overnight, no further bleeding since yesterday. EGD results noted with Gastric Arterial Bleeding s/p clipping and Epi injection, no signs of any ulcers or portal gastropathy. He remained hemodynamically stable, comfortable on 2 L NC. H/H stable 8.4/26, s/p 2 units of blood yesterday. Also had PD overnight. Bun/Cr 106/6.5. Home Prograf resumed today. OK to transfer out of ICU.     Review of Systems   Constitutional:  Positive for activity change, appetite change and fatigue. Negative for chills and fever.   HENT: Negative.     Eyes: Negative.    Respiratory:  Negative for wheezing.    Gastrointestinal:  Negative for abdominal distention, abdominal pain, diarrhea, nausea and vomiting.   Endocrine: Negative.    Genitourinary: Negative.    Musculoskeletal:  Positive for back pain and gait problem.   Skin:  Positive for pallor.   Allergic/Immunologic: Negative.    Neurological:  Positive for weakness. Negative for seizures and headaches.   Psychiatric/Behavioral: Negative.     All other systems reviewed and are negative.  Objective:     Vital Signs (Most Recent):  Temp: 97.9 °F (36.6 °C) (08/24/22 1145)  Pulse: 105 (08/24/22 1145)  Resp: (!) 23 (08/24/22 1145)  BP: (!) 97/57 (08/24/22 1145)  SpO2: 97 % (08/24/22 1145)   Vital Signs (24h Range):  Temp:  [97.9 °F (36.6 °C)-98.4 °F (36.9 °C)] 97.9 °F (36.6 °C)  Pulse:  [] 105  Resp:  [16-33] 23  SpO2:  [97 %-100 %] 97 %  BP: ()/(50-70) 97/57     Weight: 85.3 kg (188 lb 0.8 oz)  Body mass index is 24.13 kg/m².    Intake/Output Summary (Last 24 hours) at 8/24/2022 1148  Last data filed at 8/24/2022 1100  Gross per 24 hour   Intake 1333.29 ml   Output 1314 ml   Net 19.29 ml      Physical Exam  Vitals and nursing note reviewed.   Constitutional:       Appearance: Normal appearance.   HENT:      Head: Normocephalic and atraumatic.      Nose: Nose normal.   Eyes:      Extraocular  Movements: Extraocular movements intact.      Pupils: Pupils are equal, round, and reactive to light.   Cardiovascular:      Rate and Rhythm: Normal rate and regular rhythm.   Pulmonary:      Effort: Pulmonary effort is normal. No respiratory distress.      Comments: Symmetric chest rise.  Abdominal:      General: Abdomen is flat. There is no distension.      Palpations: Abdomen is soft.   Musculoskeletal:         General: No deformity or signs of injury.      Right lower leg: No edema.      Left lower leg: No edema.   Skin:     General: Skin is warm and dry.   Neurological:      General: No focal deficit present.      Mental Status: He is alert and oriented to person, place, and time.       Significant Labs: All pertinent labs within the past 24 hours have been reviewed.  BMP:   Recent Labs   Lab 08/24/22  0535   *   *   K 3.5   CL 97   CO2 21*   *   CREATININE 6.5*   CALCIUM 8.3*   MG 1.5*     CBC:   Recent Labs   Lab 08/23/22  1812 08/24/22  0019 08/24/22  0535   WBC 14.84* 8.85 8.22   HGB 9.0* 8.6* 8.4*   HCT 27.4* 25.1* 26.0*    179 179     CMP:   Recent Labs   Lab 08/23/22  1239 08/24/22  0535   * 135*   K 4.0 3.5   CL 96 97   CO2 22* 21*   * 224*   * 106*   CREATININE 6.3* 6.5*   CALCIUM 9.0 8.3*   PROT 6.1  --    ALBUMIN 2.6*  --    BILITOT 0.6  --    ALKPHOS 47*  --    AST 9*  --    ALT 10  --    ANIONGAP 17* 17*     Magnesium:   Recent Labs   Lab 08/24/22  0535   MG 1.5*       Significant Imaging: I have reviewed all pertinent imaging results/findings within the past 24 hours.

## 2022-08-24 NOTE — NURSING
Pt placed on tele monitor. Transported via wheelchair by charge nurse GUTIERREZ Chavez to telemetry room 209. Family at bedside, pt's cell phone sent with his spouse. 1 unit PRBC nearing end of transfusion. GUTIERREZ Gimenez notified.

## 2022-08-24 NOTE — PLAN OF CARE
POC reviewed with pt. Pt verbalizes understanding of POC. No questions at this time.  AAOx4. NADN.  NSR with some tachycardia on cardiac monitor.  Pt remains free of falls. Can ambulate to restroom with x1 assistance.   1 unit of blood started in ICU and finished here on telemetry.   No complaints at this time.  Safety measures in place. Will continue to monitor.  Informed pt to call for assistance before getting up. Pt verbalizes understanding.  Hourly rounding complete.

## 2022-08-24 NOTE — ASSESSMENT & PLAN NOTE
S/p EGD. Gastric ulcer with oozing vessel treated.    Continue Protonix drip. - hypotensive on presentation to local ED.   Agree with clear liquid diet.   Avoid NSAIDs.  Monitor for signs of ongoing bleeding.

## 2022-08-24 NOTE — PROGRESS NOTES
..  Alon Adamson is a 67 y.o. male for whom nephrology consult has been requested to evaluate and give opinion.     HPI: 67 year old white male with ESRD since January likely prograf-induced but etiology unknown per wife admitted with GIB; s/p EGD; hx ETOH cirrhosis and HRS requiring liver transplant several years ago; he was never on HD he states; PD started this past January; he does have HTN; I have been asked to see him for his PD/CCPD needs. Meds and labs have been reviewed in detail.    *For 8/24/22: None voiced; getting PRBCs now; 1.2 liters UF with CCPD; on last fill.     PAST MEDICAL HISTORY:  He  has a past medical history of Allergy, Anticoagulant long-term use, Arthritis, Back pain, Cellulitis, Congenital heart disease, Hearing loss, Hepatic encephalopathy, HTN (hypertension), Hypertension, Leg edema, Nephrolithiasis, JACK (obstructive sleep apnea), JACK (obstructive sleep apnea), Seizures, and Splenomegaly.    PAST SURGICAL HISTORY:  He  has a past surgical history that includes Cholecystectomy; Appendectomy; Tonsillectomy; Lumbar discectomy; Splenic artery embolization (04/08/2016); Back surgery; Colonoscopy (N/A, 1/25/2018); Liver transplant (N/A, 7/23/2018); Thoracentesis (Left, 8/3/2020); Thoracentesis (Left, 4/14/2021); Colonoscopy (N/A, 12/20/2021); and Esophagogastroduodenoscopy (N/A, 8/23/2022).    SOCIAL HISTORY:  He  reports that he has never smoked. He quit smokeless tobacco use about 4 years ago.  His smokeless tobacco use included chew. He reports that he does not drink alcohol and does not use drugs.      FAMILY MEDICAL HISTORY:  His family history includes Melanoma in his mother.    Review of patient's allergies indicates:   Allergen Reactions    Nsaids (non-steroidal anti-inflammatory drug)      D/t to liver disease.  Other reaction(s): Unknown    Penicillins Other (See Comments)     Tolerated pip-tazo in 8/2016 without issue  Tolerated cefepime 7/2018 without issue  Since  childhood was told not to take it  Other reaction(s): Unknown        atorvastatin  40 mg Oral Nightly    furosemide  80 mg Oral BID    insulin detemir U-100  2 Units Subcutaneous BID    [START ON 8/25/2022] levothyroxine  50 mcg Oral Before breakfast    mupirocin   Nasal BID    senna-docusate 8.6-50 mg  2 tablet Oral Daily    sevelamer carbonate  1,600 mg Oral TID WM    tacrolimus  1 mg Oral Daily PM    tacrolimus  2 mg Oral Daily AM    vitamin renal formula (B-complex-vitamin c-folic acid)  1 capsule Oral Daily       Prior to Admission medications    Medication Sig Start Date End Date Taking? Authorizing Provider   aspirin (ECOTRIN) 81 MG EC tablet Take 81 mg by mouth once daily.  7/23/18  Yes Historical Provider   atorvastatin (LIPITOR) 40 MG tablet Take 40 mg by mouth nightly. 4/27/22  Yes Historical Provider   B complex-vitamin C-folic acid (STAR-LAURY/NEPHRO-LAURY) 0.8 mg Tab Take 1 tablet by mouth once daily. 4/13/22  Yes Historical Provider   cyclobenzaprine (FLEXERIL) 5 MG tablet Take 5 mg by mouth 3 (three) times daily as needed. 1/27/22  Yes Historical Provider   furosemide (LASIX) 40 MG tablet Take 80 mg by mouth 2 (two) times a day. 11/10/20  Yes Historical Provider   gentamicin (GARAMYCIN) 0.1 % cream Apply topically 2 (two) times daily. 5/27/22  Yes Historical Provider   levothyroxine (SYNTHROID) 50 MCG tablet Take 50 mcg by mouth once daily. 8/3/22  Yes Historical Provider   ondansetron (ZOFRAN) 4 MG tablet TAKE 1 TABLET BY MOUTH EVERY 8 HOURS FOR 4 DAYS 4/7/22  Yes Historical Provider   promethazine (PHENERGAN) 25 MG tablet Take 25 mg by mouth 3 (three) times daily as needed. 8/22/22  Yes Historical Provider   sevelamer carbonate (RENVELA) 800 mg Tab Take 1,600 mg by mouth 3 (three) times daily with meals. 7/6/21  Yes Historical Provider   tacrolimus (PROGRAF) 1 MG Cap Take 2 capsules (2 mg total) by mouth every morning AND 1 capsule (1 mg total) every evening. 6/13/22  Yes Miriam García MD  "  ergocalciferol (VITAMIN D2) 50,000 unit Cap Take 50,000 Units by mouth every 7 days. (Saturdays)    Historical Provider   oxyCODONE (ROXICODONE) 5 MG immediate release tablet Take 5 mg by mouth every 12 (twelve) hours as needed.    Historical Provider        REVIEW OF SYSTEMS:  Patient has no fever, fatigue, visual changes, chest pain, edema, cough, dyspnea, nausea, vomiting, constipation, diarrhea, arthralgias, pruritis, dizziness, weakness, depression, confusion.        PHYSICAL EXAM:   height is 6' 2.02" (1.88 m) and weight is 85.3 kg (188 lb 0.8 oz). His oral temperature is 97.9 °F (36.6 °C). His blood pressure is 97/57 (abnormal) and his pulse is 105. His respiration is 23 (abnormal) and oxygen saturation is 97%.   Gen: WDWN male in no apparent distress  Psych: Normal mood and affect  Skin: No rashes or ulcers  Eyes: Normal conjunctiva and lids, PERRLA  ENT: Normal hearing with no oropharyngeal lesions  Neck: No JVD  Chest: Clear with no rales, rhonchi, wheezing with normal effort  CV: Regular with no murmurs, gallops or rubs  Abd: Soft, nontender, no distension, positive bowel sounds; exit site clean and dry and stable.   Ext: No cyanosis, clubbing or edema          IMPRESSION AND RECOMMENDATIONS:    ESRD  ESLD s/p liver Transplant on Prograf  HTN  GIB  CAD    Plan: CCPD arranged; his regimen is 2.7 liters/exchange x 4 exchanges; and a last fill with 2 liters until re-connection; all are 2.5% dianeal including the last fill; for simplicity will provide 2.5 liters x 4 exchanges and 2 liters last fill; all 2.5% dianeal; exit site looks clean and dry; meds and labs reviewed; he did have some problems draining last night's session, so I assume he has fluid in his belly at present which will first be drained; following closely with you.     *For 8/24/22: CCPD to resume tonight without change; Mag and K replaced; ok for home lasix from renal perspective; BINA RX x 1 to keep Hgb 10 or higher (OK to give even with " xfusion).     Pj Loco MD  [phone]          Pj Loco MD

## 2022-08-24 NOTE — HOSPITAL COURSE
67 yr male with hx of alcoholic cirrhosis s/p liver transplant in 2018 (on tacrolimus), CAD, HTN, DM, thrombocytopenia, recurrent/persistent left pleural effusion (s/p prior thoracenteses), ESRD on PD, anemia (hemoglobin 9.1 in 4/ 2022), and JACK transferred from [place] on 8/23 with hematemesis x 1 days. He is on ASA but no other anticoagulation. He noted having additional hematemesis this morning and had 1 episode of dark bloody emesis in the ER. He is awake and alert with no alteration in mentation. Initial SBP were in the 70s, and he received a small bolus of NS and was started on 2 units of blood. He also was given 80 mg Protonix and started on Protonix infusion. Octreotide initiated and then d/mumtaz as BP improved with PRBCs. He was transferred to [facility] ER for GI/EGD, accepted by Dr. Houser. Initial labs COVID negative, INR 1.03, H/H 8/23, platelets 250. Repeat labs here Hgb 9.3, Plt 182. He is admitted to ICU under [facility]osp [facility] for EGD and may need Levophed gtt for BP support.     8/24- appreciate Dr. Houser, looks and feels much better, rested well overnight, no further bleeding since yesterday. EGD results noted with Gastric Arterial Bleeding s/p clipping and Epi injection, no signs of any ulcers or portal gastropathy. He remained hemodynamically stable, comfortable on 2 L NC. H/H stable 8.4/26, s/p 2 units of blood yesterday. Also had PD overnight. Bun/Cr 106/6.5. Home Prograf resumed today. OK to transfer out of ICU.     8/25- looks and feels much better, rested well, no further hematemesis or rectal bleeding, no CP or SOB or dizziness, eating drinking well, going to BR. Tolerated PD well. GI has Cleared him. H/H stable at 8.6/26, plats 120 K. He was counseled in detail about his diet and meds including talking Protonix 40 mg bid x 2 months and Tab Carafate qid qac x 2 weeks. He will follow with his PCP every couple of weeks to make sure his H/H is stable and improving. He will have repeat EGD in 2 months. He  understands and accepts. He was seen and examined and deemed stable for discharge home today.

## 2022-08-24 NOTE — PROGRESS NOTES
[place] - Intensive Care [facility] Medicine  Progress Note    Patient Name: Alon Adamson  MRN: [id number]  Patient Class: IP- Inpatient   Admission Date: 8/23/2022  Length of Stay: 1 days  Attending Physician: Ector Nelson MD  Primary Care Provider: Bruno Oconnor NP        Subjective:     Principal Problem:<principal problem not specified>        HPI:  67-year-old male with hx of alcoholic cirrhosis s/p liver transplant in 2018 (on tacrolimus), CAD, HTN, DM, thrombocytopenia, recurrent/persistent left pleural effusion (s/p prior thoracenteses), ESRD on PD, anemia (hemoglobin 9.1 in 4/ 2022), and JACK presented to [facility] in [place] on the morning of August 23 with hematemesis. He is on ASA but no other anticoagulation. Episodes of hematemesis started the day prior, and he was seen at a different emergency department. Patient said he was told he had diverticulitis and was sent home on oral antibiotics. He noted having additional hematemesis this morning. He had 1 episode of dark bloody emesis in the ER. He is awake and alert with no alteration in mentation. Initial SBP were in the 70s, and he received a small bolus of NS and was started on 2 units of blood. He also was given 80 mg Protonix and started on Protonix infusion. Octreotide initiated and then d/mumtaz as BP improved with pRBCs. He was transferred to [facility] ER for GI/EGD, accepted by Dr. Houser.  Initial labs COVID negative, INR 1.03, H/H 8/23, platelets 250. Repeat labs here Hgb 9.3, Plt 182. He is admitted to ICU under [facility] for EGD and may need Levophed gtt for BP support.         Overview/Hospital Course:  67 yr male with hx of alcoholic cirrhosis s/p liver transplant in 2018 (on tacrolimus), CAD, HTN, DM, thrombocytopenia, recurrent/persistent left pleural effusion (s/p prior thoracenteses), ESRD on PD, anemia (hemoglobin 9.1 in 4/ 2022), and JACK transferred from [place] on 8/23 with hematemesis x 1 days. He is on ASA  but no other anticoagulation. He noted having additional hematemesis this morning and had 1 episode of dark bloody emesis in the ER. He is awake and alert with no alteration in mentation. Initial SBP were in the 70s, and he received a small bolus of NS and was started on 2 units of blood. He also was given 80 mg Protonix and started on Protonix infusion. Octreotide initiated and then d/mumtaz as BP improved with PRBCs. He was transferred to [facility] ER for GI/EGD, accepted by Dr. Houser. Initial labs COVID negative, INR 1.03, H/H 8/23, platelets 250. Repeat labs here Hgb 9.3, Plt 182. He is admitted to ICU under Hosp Med for EGD and may need Levophed gtt for BP support.     8/24- appreciate Dr. Houser, looks and feels much better, rested well overnight, no further bleeding since yesterday. EGD results noted with Gastric Arterial Bleeding s/p clipping and Epi injection, no signs of any ulcers or portal gastropathy. He remained hemodynamically stable, comfortable on 2 L NC. H/H stable 8.4/26, s/p 2 units of blood yesterday. Also had PD overnight. Bun/Cr 106/6.5. Home Prograf resumed today. OK to transfer out of ICU.       Interval History: appreciate Dr. Houser, looks and feels much better, rested well overnight, no further bleeding since yesterday. EGD results noted with Gastric Arterial Bleeding s/p clipping and Epi injection, no signs of any ulcers or portal gastropathy. He remained hemodynamically stable, comfortable on 2 L NC. H/H stable 8.4/26, s/p 2 units of blood yesterday. Also had PD overnight. Bun/Cr 106/6.5. Home Prograf resumed today. OK to transfer out of ICU.     Review of Systems   Constitutional:  Positive for activity change, appetite change and fatigue. Negative for chills and fever.   HENT: Negative.     Eyes: Negative.    Respiratory:  Negative for wheezing.    Gastrointestinal:  Negative for abdominal distention, abdominal pain, diarrhea, nausea and vomiting.   Endocrine: Negative.     Genitourinary: Negative.    Musculoskeletal:  Positive for back pain and gait problem.   Skin:  Positive for pallor.   Allergic/Immunologic: Negative.    Neurological:  Positive for weakness. Negative for seizures and headaches.   Psychiatric/Behavioral: Negative.     All other systems reviewed and are negative.  Objective:     Vital Signs (Most Recent):  Temp: 97.9 °F (36.6 °C) (08/24/22 1145)  Pulse: 105 (08/24/22 1145)  Resp: (!) 23 (08/24/22 1145)  BP: (!) 97/57 (08/24/22 1145)  SpO2: 97 % (08/24/22 1145)   Vital Signs (24h Range):  Temp:  [97.9 °F (36.6 °C)-98.4 °F (36.9 °C)] 97.9 °F (36.6 °C)  Pulse:  [] 105  Resp:  [16-33] 23  SpO2:  [97 %-100 %] 97 %  BP: ()/(50-70) 97/57     Weight: 85.3 kg (188 lb 0.8 oz)  Body mass index is 24.13 kg/m².    Intake/Output Summary (Last 24 hours) at 8/24/2022 1148  Last data filed at 8/24/2022 1100  Gross per 24 hour   Intake 1333.29 ml   Output 1314 ml   Net 19.29 ml      Physical Exam  Vitals and nursing note reviewed.   Constitutional:       Appearance: Normal appearance.   HENT:      Head: Normocephalic and atraumatic.      Nose: Nose normal.   Eyes:      Extraocular Movements: Extraocular movements intact.      Pupils: Pupils are equal, round, and reactive to light.   Cardiovascular:      Rate and Rhythm: Normal rate and regular rhythm.   Pulmonary:      Effort: Pulmonary effort is normal. No respiratory distress.      Comments: Symmetric chest rise.  Abdominal:      General: Abdomen is flat. There is no distension.      Palpations: Abdomen is soft.   Musculoskeletal:         General: No deformity or signs of injury.      Right lower leg: No edema.      Left lower leg: No edema.   Skin:     General: Skin is warm and dry.   Neurological:      General: No focal deficit present.      Mental Status: He is alert and oriented to person, place, and time.       Significant Labs: All pertinent labs within the past 24 hours have been reviewed.  BMP:   Recent Labs    Lab 08/24/22  0535   *   *   K 3.5   CL 97   CO2 21*   *   CREATININE 6.5*   CALCIUM 8.3*   MG 1.5*     CBC:   Recent Labs   Lab 08/23/22  1812 08/24/22  0019 08/24/22  0535   WBC 14.84* 8.85 8.22   HGB 9.0* 8.6* 8.4*   HCT 27.4* 25.1* 26.0*    179 179     CMP:   Recent Labs   Lab 08/23/22  1239 08/24/22  0535   * 135*   K 4.0 3.5   CL 96 97   CO2 22* 21*   * 224*   * 106*   CREATININE 6.3* 6.5*   CALCIUM 9.0 8.3*   PROT 6.1  --    ALBUMIN 2.6*  --    BILITOT 0.6  --    ALKPHOS 47*  --    AST 9*  --    ALT 10  --    ANIONGAP 17* 17*     Magnesium:   Recent Labs   Lab 08/24/22  0535   MG 1.5*       Significant Imaging: I have reviewed all pertinent imaging results/findings within the past 24 hours.      Assessment/Plan:      Acute upper GI bleeding  Hx of Liver Cirrhosis with Esophageal varices in the past s/p Liver Tx in 2018  S/p IVF, Protonix Gtt plus Octreotide gtt  Admitted to ICU, await EGD    S/p EGD, doing better,   Continue Protonix,   OK to transfer out of ICU    Acute blood loss anemia (ABLA)  Sec to Hematemesis s/p 2 units of blood  Getting IV Protonix and got IV Octreotide  Await EGD  T&C 2 more units of blood    H/H stable, s/p 2 units of blood yesterday      Hypotension due to hypovolemia  Sec to Acute Blood Loss and possibly on going bleeding, s/p 2 units of blood and Octreotide,  May need Levophed gtt, more blood    Improved and stable after blood, no pressors needed        Liver transplanted  GI consulted, cont Prograf    Stable, Prograf resumed    ESRD on peritoneal dialysis  Renal consulted- cont PD    PD resumed last evening- doing well      Coronary artery disease involving native coronary artery of native heart without angina pectoris  Stable, no ischemia      DM2 (diabetes mellitus, type 2)  Patient's FSGs are controlled on current medication regimen.  Last A1c reviewed-   Lab Results   Component Value Date    HGBA1C 5.6 08/23/2022     Most  recent fingerstick glucose reviewed-   Recent Labs   Lab 08/23/22  2114 08/24/22  0132 08/24/22  0533 08/24/22  1001   POCTGLUCOSE 297* 262* 221* 171*     Current correctional scale  Low  Maintain anti-hyperglycemic dose as follows-   Antihyperglycemics (From admission, onward)            Start     Stop Route Frequency Ordered    08/24/22 1015  insulin detemir U-100 pen 2 Units         -- SubQ 2 times daily 08/24/22 0900    08/23/22 1500  insulin aspart U-100 pen 1-10 Units         -- SubQ Every 4 hours PRN 08/23/22 1401        Hold Oral hypoglycemics while patient is in the hospital.      VTE Risk Mitigation (From admission, onward)         Ordered     IP VTE LOW RISK PATIENT  Once         08/23/22 1402     Place ESE hose  Until discontinued         08/23/22 1402                Discharge Planning   MARIO:      Code Status: Full Code   Is the patient medically ready for discharge?:     Reason for patient still in hospital (select all that apply): Patient trending condition, Laboratory test, Treatment, Imaging, Consult recommendations, PT / OT recommendations and Pending disposition  Discharge Plan A: Home with family      Seen and discussed with Dr. Christine/ Mik and the ICU team  Condition: Fair  Prognosis: Guarded         Critical care time spent on the evaluation and treatment of severe organ dysfunction, review of pertinent labs and imaging studies, discussions with consulting providers and discussions with patient/family: 41 minutes.      Ector Nelson MD  Department of Hospital Medicine   '[place] - Intensive Care ([facility])

## 2022-08-24 NOTE — PROGRESS NOTES
[facility] - [facility] Care ([facility])  Critical Care Medicine  Progress Note    Patient Name: Alon Adamson  MRN: [id number]  Admission Date: 8/23/2022  Hospital Length of Stay: 1 days  Code Status: Full Code  Attending Provider: Ector Nelson MD  Primary Care Provider: Bruno Oconnor NP   Principal Problem: <principal problem not specified>    Subjective:     HPI:  67M pmh EtOH cirrhosis s/p  liver transplant 2018 (on tacrolimus),  thrombocytopenia, CAD, HTN,, , DM, recurrent L pleural effusion (s/p prior thoracenteses), ESRD on peritoneal dialysis, JACK presented to [facility] in [place], [place] the 8/23/22 morning with hematemesis. Now transferred to [facility] afternoon of 8/23/22for GI evaluation and care.    Hematemesis started yesterday 8/22/22.  Patient takes ASA daily, but no other anticoagulants.  He went to another ED and was discharged.  Hematemesis returned this morning and he went to [facility] ED.  BP was soft and pt was given fluids and 2un RBCs.  He was given a Protonix bolus and drip.  However, the patient became nauseous and the protonix was stopped per report.  He was also started on octreotide.     When pt arrived to [facility] ED, labs were repeated.  Hgb 9.3, Plt 182.           Hospital/ICU Course:  8/23:  Transferred from [place], [place].  GI consult.  Serial CBC.  Stop octreotide.  Resume protonix. EGD wit gastric fundus lesion with artery that was cauterized, injected with epi and 3 clips with hemostasis.  8/24:  No sign of bleeding overnight.  Hgb 8.4, Plt 179, INR 1.0.  Remains on protonix drip.  GI started clears.      Past Medical History:   Diagnosis Date    Allergy     Anticoagulant long-term use     Arthritis     Back pain     Cellulitis     Congenital heart disease     Hearing loss     right ear    Hepatic encephalopathy     HTN (hypertension)     Hypertension     diagnosed today (11/25/2015) and prescribed toprol    Leg edema     Nephrolithiasis      JACK (obstructive sleep apnea)     JACK (obstructive sleep apnea)     Seizures     per Pt last seizure 2018- controlled wit medication    Splenomegaly        All history:  Patient states he is feeling better.  He specifically denies nausea, vomiting, hematemesis, abdominal pain, melena, blood per rectum.    In addition, he denies headache, chest pain, shortness of breath.  Objective:     Vital Signs (Most Recent):  Temp: 98.4 °F (36.9 °C) (08/24/22 0715)  Pulse: 103 (08/24/22 0815)  Resp: (!) 22 (08/24/22 0815)  BP: (!) 98/57 (08/24/22 0815)  SpO2: 97 % (08/24/22 0815) Vital Signs (24h Range):  Temp:  [97.6 °F (36.4 °C)-98.4 °F (36.9 °C)] 98.4 °F (36.9 °C)  Pulse:  [] 103  Resp:  [16-33] 22  SpO2:  [97 %-100 %] 97 %  BP: ()/(50-70) 98/57     Weight: 85.3 kg (188 lb 0.8 oz)  Body mass index is 24.13 kg/m².      Intake/Output Summary (Last 24 hours) at 8/24/2022 0852  Last data filed at 8/24/2022 0845  Gross per 24 hour   Intake 913.29 ml   Output 1314 ml   Net -400.71 ml       Physical Exam  Vitals reviewed.   Constitutional:       Appearance: Normal appearance.   HENT:      Head: Normocephalic and atraumatic.      Nose: Nose normal.   Eyes:      Extraocular Movements: Extraocular movements intact.      Pupils: Pupils are equal, round, and reactive to light.   Cardiovascular:      Rate and Rhythm: Normal rate and regular rhythm.   Pulmonary:      Effort: Pulmonary effort is normal. No respiratory distress.      Comments: Symmetric chest rise.  Abdominal:      General: Abdomen is flat. There is no distension.      Palpations: Abdomen is soft.   Musculoskeletal:         General: No deformity or signs of injury.      Right lower leg: No edema.      Left lower leg: No edema.   Skin:     General: Skin is warm and dry.   Neurological:      General: No focal deficit present.      Mental Status: He is alert and oriented to person, place, and time.       Vents:       Lines/Drains/Airways       Peripheral  Intravenous Line  Duration                  Peripheral IV - Single Lumen 08/23/22 0000 20 G Distal;Left;Posterior Wrist 1 day         Peripheral IV - Single Lumen 08/23/22 0000 20 G Left;Posterior Hand 1 day         Peripheral IV - Single Lumen 08/23/22 1239 18 G Right Antecubital <1 day                    Significant Labs:    CBC/Anemia Profile:  Recent Labs   Lab 08/23/22  1812 08/24/22  0019 08/24/22  0535   WBC 14.84* 8.85 8.22   HGB 9.0* 8.6* 8.4*   HCT 27.4* 25.1* 26.0*    179 179   MCV 97 92 93   RDW 16.8* 16.9* 16.4*        Chemistries:  Recent Labs   Lab 08/23/22  1239 08/23/22  1811 08/24/22  0535   *  --  135*   K 4.0  --  3.5   CL 96  --  97   CO2 22*  --  21*   *  --  106*   CREATININE 6.3*  --  6.5*   CALCIUM 9.0  --  8.3*   ALBUMIN 2.6*  --   --    PROT 6.1  --   --    BILITOT 0.6  --   --    ALKPHOS 47*  --   --    ALT 10  --   --    AST 9*  --   --    MG  --   --  1.5*   PHOS  --  4.5  --        A1C:   Recent Labs   Lab 08/23/22  1414   HGBA1C 5.6     Bilirubin:   Recent Labs   Lab 08/23/22  1239   BILITOT 0.6     Coagulation:   Recent Labs   Lab 08/24/22  0535   INR 1.0     Lactic Acid: No results for input(s): LACTATE in the last 48 hours.  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  Troponin: No results for input(s): TROPONINI in the last 48 hours.  Urine Culture: No results for input(s): LABURIN in the last 48  hours.  Urine Studies: No results for input(s): COLORU, APPEARANCEUA, PHUR, SPECGRAV, PROTEINUA, GLUCUA, KETONESU, BILIRUBINUA, OCCULTUA, NITRITE, UROBILINOGEN, LEUKOCYTESUR, RBCUA, WBCUA, BACTERIA, SQUAMEPITHEL, HYALINECASTS in the last 48 hours.    Invalid input(s): WRIGHTSUR    Significant Imaging:   I have reviewed all pertinent imaging results/findings within the past 24 hours.      ABG  No results for input(s): PH, PO2, PCO2, HCO3, BE in the last 168 hours.  Assessment/Plan:     Cardiac/Vascular  Coronary artery disease involving native coronary artery of native heart without angina pectoris  Hold ASA in setting of GIB      Oncology  Acute blood loss anemia (ABLA)  Standard goals for transfusion in pt with UGIB.  See assessment and plan for acute upper GI bleed    Endocrine  DM2 (diabetes mellitus, type 2)  subcu insulin    GI  Liver transplanted  2/2 remote EtOH abuse.  On tacrolimus as outpt  tacrolimus level pending  GI consult to manage anti-rejection meds  Out[facility] GI is Dr. García at [facility]    Acute upper GI bleeding  No further hematemesis  PPI drip.  S/p EGD with gastric fundus lesion w/ artery with clip x3, cautery, and injected with epi  Serial CBC  Transfuse as appropriate for goal Hgb > 7, active bleeding, goal Plt > 50, Goal INR <= 1.6  GI consult       Critical Care Daily Checklist:    A: Awake: RASS Goal/Actual Goal: RASS Goal: 0-->alert and calm  Actual:     B: Spontaneous Breathing Trial Performed?     C: SAT & SBT Coordinated?  Not intubated                 D: Delirium: CAM-ICU Overall CAM-ICU: Negative   E: Early Mobility Performed? Yes   F: Feeding Goal:    Status:     Current Diet Order   Procedures    Diet clear liquid      AS: Analgesia/Sedation No sedation indicated   T: Thromboembolic Prophylaxis SCDs.  No pharmacologic prophylaxis at this time.   H: HOB > 300 Yes   U: Stress Ulcer Prophylaxis (if needed) Pantoprazole drip   G: Glucose Control Subcutaneous insulin   B: Bowel  Function Stool Occurrence: 0   I: Indwelling Catheter (Lines & [place]) Necessity    D: De-escalation of Antimicrobials/Pharmacotherapies Not on antibiotics    Plan for the day/ETD Monitor for signs of upper GI bleeding  Serial CBC   Start clear liquid diet    Code Status:  Family/Goals of Care: Full Code  Patient updated at bedside     Non critical care time: 40 minutes     Wu Christine MD  Critical Care Medicine  [facility] - [facility] ([facility])

## 2022-08-24 NOTE — ASSESSMENT & PLAN NOTE
Sec to Hematemesis s/p 2 units of blood  Getting IV Protonix and got IV Octreotide  Await EGD  T&C 2 more units of blood    H/H stable, s/p 2 units of blood yesterday

## 2022-08-24 NOTE — PLAN OF CARE
[facility] - Int[facility] Care (Hospital)  Initial Discharge Assessment       Primary Care Provider: Bruno Oconnor NP    Admission Diagnosis: Acute blood loss anemia [D62]  GI bleed [K92.2]  Upper GI bleed [K92.2]  H/O liver transplant [Z94.4]    Admission Date: 8/23/2022  Expected Discharge Date:     Discharge Barriers Identified: None    Payor: MEDICARE / Plan: MEDICARE PART A & B / Product Type: Government /     Extended Emergency Contact Information  Primary Emergency Contact: Vin Adamson   [place]  Work Phone: [phone]  Mobile Phone: [phone]  Relation: Spouse    Discharge Plan A: Home with family  Discharge Plan B: Home Health      [facility]  [address] [address]  Phone: [phone] Fax: [phone]    [facility]  [address] [address]  Phone: [phone] Fax: [phone]    [facility] [place] [address]  Phone: [phone] Fax: [phone]    [facility] [place] [address]  Phone: [phone] Fax: [phone]      Initial Assessment (most recent)     Adult Discharge Assessment - 08/24/22 1058        Discharge Assessment    Assessment Type Discharge Planning Assessment     Confirmed/corrected address, phone number and insurance Yes     Confirmed Demographics Correct on Facesheet     Source of Information patient     Communicated MARIO with patient/caregiver Date not available/Unable to determine     Reason For Admission Acute upper GI bleeding     Lives With significant other;child(boby), adult     Facility Arrived From: home     Do you expect to return to your current living situation? Yes     Do you have help at home or someone to help you manage your care at home? Yes     Who are your caregiver(s) and their phone number(s)? Vin Adamson (Spouse)     Prior to  hospitilization cognitive status: Alert/Oriented     Current cognitive status: Alert/Oriented     Walking or Climbing Stairs Difficulty none     Dressing/Bathing Difficulty none     Home Accessibility wheelchair accessible     Home Layout Able to live on 1st floor     Equipment Currently Used at Home none     Readmission within 30 days? No     Patient currently being followed by outpatient case management? No     Do you currently have service(s) that help you manage your care at home? No     Do you take prescription medications? Yes     Do you have prescription coverage? Yes     Coverage Medicare     Do you have any problems affording any of your prescribed medications? No     Is the patient taking medications as prescribed? yes     Who is going to help you get home at discharge? Vin Adamson (Spouse)     How do you get to doctors appointments? family or friend will provide     Are you on dialysis? No     Do you take coumadin? No     Discharge Plan A Home with family     Discharge Plan B Home Health     DME Needed Upon Discharge  none     Discharge Plan discussed with: Patient     Discharge Barriers Identified None        Relationship/Environment    Name(s) of Who Lives With Patient Vin Adamson (Spouse)               Anticipated DC dispo: home with family   Prior Level of Function: independent, lives with spouse and adult son  PCP: Bruno Oconnor NP    Comments:  CM met with patient at bedside to introduce role and discuss discharge planning. Patient lives with his spouse ans son who will also be help at home and can provide transport at time of discharge. CM discharge needs depends on hospital progress. CM will continue following to assist with other needs.

## 2022-08-24 NOTE — ASSESSMENT & PLAN NOTE
Hx of Liver Cirrhosis with Esophageal varices in the past s/p Liver Tx in 2018  S/p IVF, Protonix Gtt plus Octreotide gtt  Admitted to ICU, await EGD    S/p EGD, doing better,   Continue Protonix,   OK to transfer out of ICU

## 2022-08-24 NOTE — ASSESSMENT & PLAN NOTE
Sec to Acute Blood Loss and possibly on going bleeding, s/p 2 units of blood and Octreotide,  May need Levophed gtt, more blood    Improved and stable after blood, no pressors needed

## 2022-08-25 PROBLEM — E86.1 HYPOTENSION DUE TO HYPOVOLEMIA: Status: RESOLVED | Noted: 2022-01-01 | Resolved: 2022-01-01

## 2022-08-25 PROBLEM — D62 ACUTE BLOOD LOSS ANEMIA (ABLA): Status: RESOLVED | Noted: 2018-01-22 | Resolved: 2022-01-01

## 2022-08-25 PROBLEM — K92.2 ACUTE UPPER GI BLEEDING: Status: RESOLVED | Noted: 2022-01-01 | Resolved: 2022-01-01

## 2022-08-25 NOTE — PT/OT/SLP EVAL
Physical Therapy Evaluation and Discharge Note    Patient Name:  Alon Adamson   MRN:  [id number]    Recommendations:     Discharge Recommendations:  home   Discharge Equipment Recommendations: none   Barriers to discharge: None    Assessment:     Alon Adamson is a 67 y.o. male admitted with a medical diagnosis of Acute upper GI bleeding. .  At this time, patient is functioning at their prior level of function and does not require further acute PT services.     Recent Surgery: Procedure(s) (LRB):  EGD (ESOPHAGOGASTRODUODENOSCOPY) (N/A) 2 Days Post-Op    Plan:     During this hospitalization, patient does not require further acute PT services.  Please re-consult if situation changes.  D/C PT FROM P.T. SERVICES TO PEOPLE 'S PROGRAM FOR CONTINUED GAIT TO TOLERANCE IN ORDER TO MAINTAIN CURRENT FUNCTIONAL MOBILITY    Subjective     Chief Complaint:   Patient/Family Comments/goals:   Pain/Comfort:  · Pain Rating 1: 0/10    Patients cultural, spiritual, Alevism conflicts given the current situation:      Living Environment:  PT LIVES WITH WIFE AND SON MOBILE HOME WITH RAMP TO ENTER, AMB INDEP COMMUNITY DISTANCES, DOES NOT DRIVE, INDEP WITH ADL'S  Prior to admission, patients level of function was INDEP.  Equipment used at home: wheelchair, walker, rolling, shower chair (SOCK AIDE).  DME owned (not currently used): none.  Upon discharge, patient will have assistance from FAMILY.    Objective:     Communicated with NURSE MEHTA prior to session.  Patient found supine with telemetry, peripheral IV upon PT entry to room.    General Precautions: Standard  Orthopedic Precautions:N/A   Braces: N/A   Respiratory Status: Room air    Exams:  · Cognitive Exam:  Patient is oriented to Person, Place, Time and Situation  · Postural Exam:  Patient presented with the following abnormalities:    · -       Rounded shoulders  · Sensation:    · -       Intact  · RLE ROM: WFL  · RLE Strength: GROSSLY 4/5  · LLE ROM:  "WFL  · LLE Strength: GROSSLY 4/5    Functional Mobility:  · Bed Mobility:     · Rolling Left:  modified independence  · Scooting: modified independence  · Supine to Sit: modified independence  · Transfers:     · Sit to Stand:  modified independence with no AD  · Bed to Chair: modified independence with  no AD  using  Step Transfer  · Gait: PT ' NO AD LUCY, SLOW STEADY PACE, NO LOB OR SOB ON ROOM AIR  · Balance: GOOD    AM-PAC 6 CLICK MOBILITY  Total Score:21     Therapeutic Activities and Exercises:   PT EDUCATED IN ROLE OF P.T. AND POC IN ACUTE CARE HOSPITAL SETTING, ENCOURAGED TO INCREASE TIME OOB IN CHAIR TO TOLERANCE, EDUCATED IN AND ENCOURAGED TO PERFORM BLE THEREX WHILE SEATED OR SUPINE THROUGHOUT THE DAY TO TOLERANCE: HIP FLEX/EXT, QUAD SET, LAQ, HEEL SLIDES, AP'S.  PT AGREEABLE TO REQUESTS  PT EDUCATED ON RISK FOR FALLS DUE TO GENERALIZED WEAKNESS, EDUCATED ON "CALL DON'T FALL", ENCOURAGED TO CALL FOR ASSISTANCE WITH ALL NEEDS SUCH AS BED<>CHAIR TRANSFERS OR TRIPS TO BATHROOM, PT AGREEABLE TO SAFETY PRECAUTIONS    AM-PAC 6 CLICK MOBILITY  Total Score:21     Patient left up in chair with all lines intact, call button in reach, chair alarm on and NURSE notified.    GOALS:   Multidisciplinary Problems     Physical Therapy Goals     Not on file                History:     Past Medical History:   Diagnosis Date    Allergy     Anticoagulant long-term use     Arthritis     Back pain     Cellulitis     Congenital heart disease     Hearing loss     right ear    Hepatic encephalopathy     HTN (hypertension)     Hypertension     diagnosed today (11/25/2015) and prescribed toprol    Leg edema     Nephrolithiasis     JACK (obstructive sleep apnea)     JACK (obstructive sleep apnea)     Seizures     per Pt last seizure 2018- controlled wit medication    Splenomegaly        Past Surgical History:   Procedure Laterality Date    APPENDECTOMY      Open    BACK SURGERY      CHOLECYSTECTOMY      " laprascopic    COLONOSCOPY N/A 1/25/2018    Procedure: COLONOSCOPY;  Surgeon: Lashawn Myles MD;  Location: [facility] (2ND FLR);  Service: Endoscopy;  Laterality: N/A;    COLONOSCOPY N/A 12/20/2021    Procedure: COLONOSCOPY;  Surgeon: Jesus Grayson MD;  Location: [facility] (2ND FLR);  Service: Endoscopy;  Laterality: N/A;  start with a pediatric colonoscope and might need the slim pediatric colonoscope available.   priority case per Dr. Grayson-labwork am of procedure  RAPID COVID test coming for > 3 hrs away/ prep ins. [email or web address] / Pt requesting specific date for travel purposes    ESOPHAGOGASTRODUODENOSCOPY N/A 8/23/2022    Procedure: EGD (ESOPHAGOGASTRODUODENOSCOPY);  Surgeon: Nora Houser MD;  Location: [facility];  Service: Endoscopy;  Laterality: N/A;    LIVER TRANSPLANT N/A 7/23/2018    Procedure: TRANSPLANT, LIVER;  Surgeon: Alma Joyce MD;  Location: [facility] OR 2ND FLR;  Service: Transplant;  Laterality: N/A;    LUMBAR DISCECTOMY      SPLENIC ARTERY EMBOLIZATION  04/08/2016    Dr Taveras    THORACENTESIS Left 8/3/2020    Procedure: Thoracentesis  U/S notified;  Surgeon: Augustine Rodriguez MD;  Location: [facility];  Service: Pulmonary;  Laterality: Left;    THORACENTESIS Left 4/14/2021    Procedure: Thoracentesis;  Surgeon: Augustine Rodriguez MD;  Location: [facility];  Service: Pulmonary;  Laterality: Left;    TONSILLECTOMY         Time Tracking:     PT Received On: 08/25/22  PT Start Time: 1030     PT Stop Time: 1053  PT Total Time (min): 23 min     Billable Minutes: Evaluation 15 and Therapeutic Activity 8    08/25/2022

## 2022-08-25 NOTE — NURSING
Discharge instructions received and reviewed with pt at bedside.  Pt voiced understanding and all questions answered to satisfaction.  Stressed importance to making and keeping all follow up appointments.  Medications brought to bedside and reviewed with pt.  Tele monitor removed and brought to monitor tech.  IV d/c'd with tip intact, pressure dressing applied.  Pt eating lunch and awaiting ride.

## 2022-08-25 NOTE — PLAN OF CARE
PD cycler started per p/p and Dr. Loco orders. See pd orders. See pd flow sheet. Tolerating. Line secure. Reported to primary nurse.

## 2022-08-25 NOTE — ASSESSMENT & PLAN NOTE
S/p EGD. Gastric ulcer with oozing, vessel treated.    Continue Protonix IV bid. Recommend Protonix po bid upon discharge.   Continue Carafate x 2 weeks.   Advance diet as tolerated.   Avoid NSAIDs.  Monitor for signs of ongoing bleeding.

## 2022-08-25 NOTE — PROGRESS NOTES
..  Alon Adamson is a 67 y.o. male for whom nephrology consult has been requested to evaluate and give opinion.     HPI: 67 year old white male with ESRD since January likely prograf-induced but etiology unknown per wife admitted with GIB; s/p EGD; hx ETOH cirrhosis and HRS requiring liver transplant several years ago; he was never on HD he states; PD started this past January; he does have HTN; I have been asked to see him for his PD/CCPD needs. Meds and labs have been reviewed in detail.    *For 8/24/22: None voiced; getting PRBCs now; 1.2 liters UF with CCPD; on last fill.     *For 8/25/22: Seen and examined; tolerated CCPD well last night; 768 cc UF    PAST MEDICAL HISTORY:  He  has a past medical history of Allergy, Anticoagulant long-term use, Arthritis, Back pain, Cellulitis, Congenital heart disease, Hearing loss, Hepatic encephalopathy, HTN (hypertension), Hypertension, Leg edema, Nephrolithiasis, JACK (obstructive sleep apnea), JACK (obstructive sleep apnea), Seizures, and Splenomegaly.    PAST SURGICAL HISTORY:  He  has a past surgical history that includes Cholecystectomy; Appendectomy; Tonsillectomy; Lumbar discectomy; Splenic artery embolization (04/08/2016); Back surgery; Colonoscopy (N/A, 1/25/2018); Liver transplant (N/A, 7/23/2018); Thoracentesis (Left, 8/3/2020); Thoracentesis (Left, 4/14/2021); Colonoscopy (N/A, 12/20/2021); and Esophagogastroduodenoscopy (N/A, 8/23/2022).    SOCIAL HISTORY:  He  reports that he has never smoked. He quit smokeless tobacco use about 4 years ago.  His smokeless tobacco use included chew. He reports that he does not drink alcohol and does not use drugs.      FAMILY MEDICAL HISTORY:  His family history includes Melanoma in his mother.    Review of patient's allergies indicates:   Allergen Reactions    Nsaids (non-steroidal anti-inflammatory drug)      D/t to liver disease.  Other reaction(s): Unknown    Penicillins Other (See Comments)     Tolerated pip-tazo  in 8/2016 without issue  Tolerated cefepime 7/2018 without issue  Since childhood was told not to take it  Other reaction(s): Unknown        atorvastatin  40 mg Oral Nightly    furosemide  80 mg Oral BID    insulin detemir U-100  2 Units Subcutaneous BID    levothyroxine  50 mcg Oral Before breakfast    mupirocin   Nasal BID    pantoprazole  40 mg Intravenous BID AC    sevelamer carbonate  1,600 mg Oral TID WM    sucralfate  1 g Oral Q6H    tacrolimus  1 mg Oral Daily PM    tacrolimus  2 mg Oral Daily AM    vitamin renal formula (B-complex-vitamin c-folic acid)  1 capsule Oral Daily       Prior to Admission medications    Medication Sig Start Date End Date Taking? Authorizing Provider   aspirin (ECOTRIN) 81 MG EC tablet Take 81 mg by mouth once daily.  7/23/18  Yes Historical Provider   atorvastatin (LIPITOR) 40 MG tablet Take 40 mg by mouth nightly. 4/27/22  Yes Historical Provider   B complex-vitamin C-folic acid (STAR-LAURY/NEPHRO-LAURY) 0.8 mg Tab Take 1 tablet by mouth once daily. 4/13/22  Yes Historical Provider   cyclobenzaprine (FLEXERIL) 5 MG tablet Take 5 mg by mouth 3 (three) times daily as needed. 1/27/22  Yes Historical Provider   furosemide (LASIX) 40 MG tablet Take 80 mg by mouth 2 (two) times a day. 11/10/20  Yes Historical Provider   gentamicin (GARAMYCIN) 0.1 % cream Apply topically 2 (two) times daily. 5/27/22  Yes Historical Provider   levothyroxine (SYNTHROID) 50 MCG tablet Take 50 mcg by mouth once daily. 8/3/22  Yes Historical Provider   ondansetron (ZOFRAN) 4 MG tablet TAKE 1 TABLET BY MOUTH EVERY 8 HOURS FOR 4 DAYS 4/7/22  Yes Historical Provider   promethazine (PHENERGAN) 25 MG tablet Take 25 mg by mouth 3 (three) times daily as needed. 8/22/22  Yes Historical Provider   sevelamer carbonate (RENVELA) 800 mg Tab Take 1,600 mg by mouth 3 (three) times daily with meals. 7/6/21  Yes Historical Provider   tacrolimus (PROGRAF) 1 MG Cap Take 2 capsules (2 mg total) by mouth every morning  "AND 1 capsule (1 mg total) every evening. 6/13/22  Yes Miriam García MD   ergocalciferol (VITAMIN D2) 50,000 unit Cap Take 50,000 Units by mouth every 7 days. (Saturdays)    Historical Provider   oxyCODONE (ROXICODONE) 5 MG immediate release tablet Take 5 mg by mouth every 12 (twelve) hours as needed.    Historical Provider        REVIEW OF SYSTEMS:  Patient has no fever, fatigue, visual changes, chest pain, edema, cough, dyspnea, nausea, vomiting, constipation, diarrhea, arthralgias, pruritis, dizziness, weakness, depression, confusion.        PHYSICAL EXAM:   height is 6' 2.02" (1.88 m) and weight is 85.3 kg (188 lb 0.8 oz). His oral temperature is 97.6 °F (36.4 °C). His blood pressure is 104/55 (abnormal) and his pulse is 87. His respiration is 17 and oxygen saturation is 99%.   Gen: WDWN male in no apparent distress  Psych: Normal mood and affect  Skin: No rashes or ulcers  Eyes: Normal conjunctiva and lids, PERRLA  ENT: Normal hearing with no oropharyngeal lesions  Neck: No JVD  Chest: Clear with no rales, rhonchi, wheezing with normal effort  CV: Regular with no murmurs, gallops or rubs  Abd: Soft, nontender, no distension, positive bowel sounds; exit site clean and dry and stable.   Ext: No cyanosis, clubbing or edema          IMPRESSION AND RECOMMENDATIONS:    ESRD  ESLD s/p liver Transplant on Prograf  HTN  GIB  CAD    Plan: CCPD arranged; his regimen is 2.7 liters/exchange x 4 exchanges; and a last fill with 2 liters until re-connection; all are 2.5% dianeal including the last fill; for simplicity will provide 2.5 liters x 4 exchanges and 2 liters last fill; all 2.5% dianeal; exit site looks clean and dry; meds and labs reviewed; he did have some problems draining last night's session, so I assume he has fluid in his belly at present which will first be drained; following closely with you.     *For 8/24/22: CCPD to resume tonight without change; Mag and K replaced; ok for home lasix from renal perspective; " BINA RX x 1 to keep Hgb 10 or higher (OK to give even with xfusion).     *For 8/25/22: OK for dc today; will follow up with regular Nephrology team after dc; no further renal recommendations at present. Will sign off.     Pj Loco MD  [phone]          Pj Loco MD

## 2022-08-25 NOTE — PT/OT/SLP EVAL
Occupational Therapy   Evaluation and Discharge Note    Name: Alon Adamson  MRN: [id number]  Admitting Diagnosis:  Acute upper GI bleeding   Recent Surgery: Procedure(s) (LRB):  EGD (ESOPHAGOGASTRODUODENOSCOPY) (N/A) 2 Days Post-Op    Recommendations:     Discharge Recommendations: home  Discharge Equipment Recommendations:  none  Barriers to discharge:  None    Assessment:     Alon Adamson is a 67 y.o. male with a medical diagnosis of Acute upper GI bleeding. At this time, patient is functioning at their prior level of function and does not require further acute OT services.     Plan:     During this hospitalization, patient does not require further acute OT services.  Please re-consult if situation changes.    · Plan of Care Reviewed with: patient    Subjective     Chief Complaint: None reported  Patient/Family Comments/goals: none reported    Occupational Profile:  Living Environment: Patient resides in a mobile home with a ramp to enter, with his wife and son.  Previous level of function: Patient was independent with community distance ambulation. He was mod I with ADLs (using a sock aide for LB dressing).   Roles and Routines: Patient does not drive or work.  Equipment Used at home:  wheelchair, walker, rolling, shower chair (sock aide)  Assistance upon Discharge: family    Pain/Comfort:  · Pain Rating 1: 0/10    Objective:     Communicated with: NurseAmmy, prior to session.  Patient found supine with telemetry, peripheral IV upon OT entry to room.    General Precautions: Standard,     Orthopedic Precautions:N/A   Braces: N/A  Respiratory Status: Room air     Bed Mobility:    · Patient completed Supine to Sit with modified independence    Functional Mobility/Transfers:  · Patient completed Sit <> Stand Transfer with modified independence  with  no assistive device   · Patient completed Bed <> Chair Transfer using Step Transfer technique with modified independence with no assistive  device  · Functional Mobility: Patient completed x150ft functional mobility without AD mod I, with increase time to promote OOB activity.    Activities of Daily Living:  · Grooming: modified independence .  · Upper Body Dressing: modified independence .    Cognitive/Visual Perceptual:  Cognitive/Psychosocial Skills:     -       Oriented to: Person, Place, Time and Situation   -       Follows Commands/attention:Follows multistep  commands    Physical Exam:  Balance:    -       sitting: WFL, static standing: WFL, dynamic standing WFL  Upper Extremity Range of Motion:     -       Right Upper Extremity: WFL  -       Left Upper Extremity: WFL  Upper Extremity Strength:    -       Right Upper Extremity: WFL  -       Left Upper Extremity: WFL   Strength:    -       Right Upper Extremity: WFL  -       Left Upper Extremity: WFL    AMPAC 6 Click ADL:  AMPAC Total Score: 22    Treatment & Education:  Patient educated on role of OT in acute setting and benefits of OOB activity. Encouraged OOB throughout the course of hospitalization to decrease risk of hospital related debility. Patient stated understanding and in agreement with POC.  Education:    Patient left up in chair with all lines intact, call button in reach, chair alarm on and nurse present    History:     Past Medical History:   Diagnosis Date    Allergy     Anticoagulant long-term use     Arthritis     Back pain     Cellulitis     Congenital heart disease     Hearing loss     right ear    Hepatic encephalopathy     HTN (hypertension)     Hypertension     diagnosed today (11/25/2015) and prescribed toprol    Leg edema     Nephrolithiasis     JACK (obstructive sleep apnea)     JACK (obstructive sleep apnea)     Seizures     per Pt last seizure 2018- controlled wit medication    Splenomegaly        Past Surgical History:   Procedure Laterality Date    APPENDECTOMY      Open    BACK SURGERY      CHOLECYSTECTOMY      laprascopic    COLONOSCOPY N/A  1/25/2018    Procedure: COLONOSCOPY;  Surgeon: Lashawn Myles MD;  Location: [facility] (2ND FLR);  Service: Endoscopy;  Laterality: N/A;    COLONOSCOPY N/A 12/20/2021    Procedure: COLONOSCOPY;  Surgeon: Jesus Grayson MD;  Location: [facility] (2ND FLR);  Service: Endoscopy;  Laterality: N/A;  start with a pediatric colonoscope and might need the slim pediatric colonoscope available.   priority case per Dr. Grayson-labwork am of procedure  RAPID COVID test coming for > 3 hrs away/ prep ins. [email or web address] / Pt requesting specific date for travel purposes    ESOPHAGOGASTRODUODENOSCOPY N/A 8/23/2022    Procedure: EGD (ESOPHAGOGASTRODUODENOSCOPY);  Surgeon: Nora Houser MD;  Location: [facility];  Service: Endoscopy;  Laterality: N/A;    LIVER TRANSPLANT N/A 7/23/2018    Procedure: TRANSPLANT, LIVER;  Surgeon: Alma Joyce MD;  Location: [facility] OR 2ND FLR;  Service: Transplant;  Laterality: N/A;    LUMBAR DISCECTOMY      SPLENIC ARTERY EMBOLIZATION  04/08/2016    Dr Taveras    THORACENTESIS Left 8/3/2020    Procedure: Thoracentesis  U/S notified;  Surgeon: Augustine Rodriguez MD;  Location: [facility];  Service: Pulmonary;  Laterality: Left;    THORACENTESIS Left 4/14/2021    Procedure: Thoracentesis;  Surgeon: Augustine Rodriguez MD;  Location: [facility];  Service: Pulmonary;  Laterality: Left;    TONSILLECTOMY         Time Tracking:     OT Date of Treatment: 08/25/22  OT Start Time: 1035  OT Stop Time: 1100  OT Total Time (min): 25 min    Billable Minutes:Evaluation 25    8/25/2022

## 2022-08-25 NOTE — PROGRESS NOTES
[facility] - Telemetry ([facility])  Gastroenterology  Progress Note    Patient Name: Alon Adamson  MRN: [id number]  Admission Date: 8/23/2022  Hospital Length of Stay: 2 days  Code Status: Full Code   Attending Provider: Ector Nelson MD  Consulting Provider: Aneesh Alas PA-C  Primary Care Physician: Bruno Oconnor NP  Principal Problem: <principal problem not specified>      Subjective:     Interval History: The patient is feeling better today. He is tolerating a full liquid diet. Denies overt bleeding. Hgb 8.6.     Review of Systems   Constitutional:         See Interval History for daily ROS.    Objective:     Vital Signs (Most Recent):  Temp: 97.6 °F (36.4 °C) (08/25/22 0706)  Pulse: 88 (08/25/22 0706)  Resp: 17 (08/25/22 0706)  BP: (!) 104/55 (08/25/22 0805)  SpO2: 99 % (08/25/22 0706)   Vital Signs (24h Range):  Temp:  [96.2 °F (35.7 °C)-98.1 °F (36.7 °C)] 97.6 °F (36.4 °C)  Pulse:  [] 88  Resp:  [15-23] 17  SpO2:  [97 %-100 %] 99 %  BP: ()/(55-62) 104/55     Weight: 85.3 kg (188 lb 0.8 oz) (08/23/22 1425)  Body mass index is 24.13 kg/m².      Intake/Output Summary (Last 24 hours) at 8/25/2022 0827  Last data filed at 8/24/2022 1512  Gross per 24 hour   Intake 1101.71 ml   Output 1194 ml   Net -92.29 ml       Lines/Drains/Airways       Peripheral Intravenous Line  Duration                  Peripheral IV - Single Lumen 08/23/22 0000 20 G Distal;Left;Posterior Wrist 2 days         Peripheral IV - Single Lumen 08/23/22 0000 20 G Left;Posterior Hand 2 days         Peripheral IV - Single Lumen 08/23/22 1239 18 G Right Antecubital 1 day                    Physical Exam  Constitutional:       General: He is not in acute distress.     Appearance: He is well-developed. He is not diaphoretic.   HENT:      Head: Normocephalic and atraumatic.   Eyes:      Extraocular Movements: Extraocular movements intact.   Cardiovascular:      Rate and Rhythm: Normal rate and regular rhythm.   Pulmonary:      Effort:  Pulmonary effort is normal. No respiratory distress.      Breath sounds: Normal breath sounds. No wheezing.   Abdominal:      General: Bowel sounds are normal. There is no distension.      Palpations: Abdomen is soft.      Tenderness: There is no abdominal tenderness.   Neurological:      Mental Status: He is alert and oriented to person, place, and time.      Cranial Nerves: No cranial nerve deficit.   Psychiatric:         Behavior: Behavior normal.       Significant Labs:  CBC:   Recent Labs   Lab 08/24/22  1806 08/25/22  0004 08/25/22  0511   WBC 8.08 6.55 6.20   HGB 8.7* 8.3* 8.6*   HCT 26.5* 25.4* 25.5*   * 123* 120*     CMP:   Recent Labs   Lab 08/23/22  1239 08/24/22  0535 08/25/22  0511   *   < > 250*   CALCIUM 9.0   < > 8.5*   ALBUMIN 2.6*  --   --    PROT 6.1  --   --    *   < > 133*   K 4.0   < > 3.5   CO2 22*   < > 22*   CL 96   < > 97   *   < > 97*   CREATININE 6.3*   < > 6.5*   ALKPHOS 47*  --   --    ALT 10  --   --    AST 9*  --   --    BILITOT 0.6  --   --     < > = values in this interval not displayed.     Coagulation:   Recent Labs   Lab 08/24/22  0535   INR 1.0         Significant Imaging:  Imaging results within the past 24 hours have been reviewed.    Assessment/Plan:     Acute upper GI bleeding  S/p EGD. Gastric ulcer with oozing, vessel treated.    Continue Protonix IV bid. Recommend Protonix po bid upon discharge.   Continue Carafate x 2 weeks.   Advance diet as tolerated.   Avoid NSAIDs.  Monitor for signs of ongoing bleeding.   Follow up with GI in 4 weeks. Repeat EGD in two months.     Acute blood loss anemia (ABLA)  Likely equilibrating. Patient asymptomatic today. No overt bleeding. Monitor.         Thank you for your consult. I will sign off. Please contact us if you have any additional questions.    Aneesh Alas PA-C  Gastroenterology  [facility] - Telemetry ([facility])

## 2022-08-25 NOTE — SUBJECTIVE & OBJECTIVE
Subjective:     Interval History: The patient is feeling better today. He is tolerating a full liquid diet. Denies overt bleeding. Hgb 8.6.     Review of Systems   Constitutional:         See Interval History for daily ROS.    Objective:     Vital Signs (Most Recent):  Temp: 97.6 °F (36.4 °C) (08/25/22 0706)  Pulse: 88 (08/25/22 0706)  Resp: 17 (08/25/22 0706)  BP: (!) 104/55 (08/25/22 0805)  SpO2: 99 % (08/25/22 0706)   Vital Signs (24h Range):  Temp:  [96.2 °F (35.7 °C)-98.1 °F (36.7 °C)] 97.6 °F (36.4 °C)  Pulse:  [] 88  Resp:  [15-23] 17  SpO2:  [97 %-100 %] 99 %  BP: ()/(55-62) 104/55     Weight: 85.3 kg (188 lb 0.8 oz) (08/23/22 1425)  Body mass index is 24.13 kg/m².      Intake/Output Summary (Last 24 hours) at 8/25/2022 0827  Last data filed at 8/24/2022 1512  Gross per 24 hour   Intake 1101.71 ml   Output 1194 ml   Net -92.29 ml       Lines/Drains/Airways       Peripheral Intravenous Line  Duration                  Peripheral IV - Single Lumen 08/23/22 0000 20 G Distal;Left;Posterior Wrist 2 days         Peripheral IV - Single Lumen 08/23/22 0000 20 G Left;Posterior Hand 2 days         Peripheral IV - Single Lumen 08/23/22 1239 18 G Right Antecubital 1 day                    Physical Exam  Constitutional:       General: He is not in acute distress.     Appearance: He is well-developed. He is not diaphoretic.   HENT:      Head: Normocephalic and atraumatic.   Eyes:      Extraocular Movements: Extraocular movements intact.   Cardiovascular:      Rate and Rhythm: Normal rate and regular rhythm.   Pulmonary:      Effort: Pulmonary effort is normal. No respiratory distress.      Breath sounds: Normal breath sounds. No wheezing.   Abdominal:      General: Bowel sounds are normal. There is no distension.      Palpations: Abdomen is soft.      Tenderness: There is no abdominal tenderness.   Neurological:      Mental Status: He is alert and oriented to person, place, and time.      Cranial Nerves: No  cranial nerve deficit.   Psychiatric:         Behavior: Behavior normal.       Significant Labs:  CBC:   Recent Labs   Lab 08/24/22  1806 08/25/22  0004 08/25/22  0511   WBC 8.08 6.55 6.20   HGB 8.7* 8.3* 8.6*   HCT 26.5* 25.4* 25.5*   * 123* 120*     CMP:   Recent Labs   Lab 08/23/22  1239 08/24/22  0535 08/25/22  0511   *   < > 250*   CALCIUM 9.0   < > 8.5*   ALBUMIN 2.6*  --   --    PROT 6.1  --   --    *   < > 133*   K 4.0   < > 3.5   CO2 22*   < > 22*   CL 96   < > 97   *   < > 97*   CREATININE 6.3*   < > 6.5*   ALKPHOS 47*  --   --    ALT 10  --   --    AST 9*  --   --    BILITOT 0.6  --   --     < > = values in this interval not displayed.     Coagulation:   Recent Labs   Lab 08/24/22  0535   INR 1.0         Significant Imaging:  Imaging results within the past 24 hours have been reviewed.

## 2022-08-25 NOTE — PLAN OF CARE
Pt AAOx4. VSS. Pt remained free of falls this shift. No complaints of pain or discomfort. Medications administered as ordered. Pt is normal sinus on monitor. Hourly rounding completed. Pt instructed to call for assistance. POC reviewed. Pt verbalized understanding. Pt had PD at the bedside this evening.

## 2022-08-25 NOTE — PLAN OF CARE
OT eval completed. Patient at functional mod I baseline. Continued skilled OT intervention not warranted. D/C OT.

## 2022-08-25 NOTE — PLAN OF CARE
[facility] - Telemetry (Hospital)  Discharge Final Note    Primary Care Provider: Bruno Oconnor NP    Expected Discharge Date: 8/25/2022    Final Discharge Note (most recent)     Final Note - 08/25/22 0904        Final Note    Assessment Type Final Discharge Note     Anticipated Discharge Disposition Home or Self Care                 Important Message from Medicare             Contact Info     [facility]   Specialty: Gastroenterology    [address] [place] [id number]   Phone: [phone]       Next Steps: Follow up in 3 week(s)    Bruno Oconnor NP   Specialty: Internal Medicine   Relationship: PCP - General    [address] [place] [id number]   Phone: [phone]       Next Steps: Follow up in 3 day(s)    Instructions: Hospital follow up        DC Dispo: home  PCP f/u: follows with non-[facility] and will need to schedule.

## 2022-08-26 NOTE — PROGRESS NOTES
2nd Attempt made to reach patient for TCC call. Left voicemail please call [phone] leave first name, last name, and  for Cierra to return call.

## 2022-08-26 NOTE — PROGRESS NOTES
C3 nurse attempted to contact Alon Adamson for a TCC post hospital discharge follow up call. No answer. The patient does not have a scheduled HOSFU appointment, and the pt does not have an [facility] PCP.

## 2022-08-27 NOTE — DISCHARGE SUMMARY
[facility] - [facility] ([facility] Medicine  Discharge Summary      Patient Name: Alon Adamson  MRN: [id number]  Patient Class: IP- Inpatient  Admission Date: 8/23/2022  Hospital Length of Stay: 2 days  Discharge Date and Time:  08/27/2022 2:48 PM  Attending Physician: No att. providers found   Discharging Provider: Ector Nelson MD  Primary Care Provider: Bruno Oconnor NP      HPI:   67-year-old male with hx of alcoholic cirrhosis s/p liver transplant in 2018 (on tacrolimus), CAD, HTN, DM, thrombocytopenia, recurrent/persistent left pleural effusion (s/p prior thoracenteses), ESRD on PD, anemia (hemoglobin 9.1 in 4/ 2022), and JACK presented to [facility] in [place] on the morning of August 23 with hematemesis. He is on ASA but no other anticoagulation. Episodes of hematemesis started the day prior, and he was seen at a different emergency department. Patient said he was told he had diverticulitis and was sent home on oral antibiotics. He noted having additional hematemesis this morning. He had 1 episode of dark bloody emesis in the ER. He is awake and alert with no alteration in mentation. Initial SBP were in the 70s, and he received a small bolus of NS and was started on 2 units of blood. He also was given 80 mg Protonix and started on Protonix infusion. Octreotide initiated and then d/mumtaz as BP improved with pRBCs. He was transferred to [facility] ER for GI/EGD, accepted by Dr. Houser.  Initial labs COVID negative, INR 1.03, H/H 8/23, platelets 250. Repeat labs here Hgb 9.3, Plt 182. He is admitted to ICU under [facility] for EGD and may need Levophed gtt for BP support.         Procedure(s) (LRB):  EGD (ESOPHAGOGASTRODUODENOSCOPY) (N/A)      Hospital Course:   67 yr male with hx of alcoholic cirrhosis s/p liver transplant in 2018 (on tacrolimus), CAD, HTN, DM, thrombocytopenia, recurrent/persistent left pleural effusion (s/p prior thoracenteses), ESRD on PD, anemia (hemoglobin 9.1 in 4/ 2022),  and JACK transferred from [place] on 8/23 with hematemesis x 1 days. He is on ASA but no other anticoagulation. He noted having additional hematemesis this morning and had 1 episode of dark bloody emesis in the ER. He is awake and alert with no alteration in mentation. Initial SBP were in the 70s, and he received a small bolus of NS and was started on 2 units of blood. He also was given 80 mg Protonix and started on Protonix infusion. Octreotide initiated and then d/mumtaz as BP improved with PRBCs. He was transferred to [facility] ER for GI/EGD, accepted by Dr. Houser. Initial labs COVID negative, INR 1.03, H/H 8/23, platelets 250. Repeat labs here Hgb 9.3, Plt 182. He is admitted to ICU under [facility] for EGD and may need Levophed gtt for BP support.     8/24- appreciate Dr. Houser, looks and feels much better, rested well overnight, no further bleeding since yesterday. EGD results noted with Gastric Arterial Bleeding s/p clipping and Epi injection, no signs of any ulcers or portal gastropathy. He remained hemodynamically stable, comfortable on 2 L NC. H/H stable 8.4/26, s/p 2 units of blood yesterday. Also had PD overnight. Bun/Cr 106/6.5. Home Prograf resumed today. OK to transfer out of ICU.     8/25- looks and feels much better, rested well, no further hematemesis or rectal bleeding, no CP or SOB or dizziness, eating drinking well, going to BR. Tolerated PD well. GI has Cleared him. H/H stable at 8.6/26, plats 120 K. He was counseled in detail about his diet and meds including talking Protonix 40 mg bid x 2 months and Tab Carafate qid qac x 2 weeks. He will follow with his PCP every couple of weeks to make sure his H/H is stable and improving. He will have repeat EGD in 2 months. He understands and accepts. He was seen and examined and deemed stable for discharge home today.         Goals of Care Treatment Preferences:  Code Status: Full Code      Consults:   Consults (From admission, onward)        Status Ordering  Provider     Inpatient consult to Critical Care Medicine  Once        Provider:  Wu Pickering MD    Completed WAYNE MOYA     Inpatient consult to Nephrology  Once        Provider:  Wu Pickering MD    Completed WU PICKERING     Inpatient consult to Gastroenterology  Once        Provider:  Nora Houser MD    Completed WALLACE HILL JR          No new Assessment & Plan notes have been filed under this hospital service since the last note was generated.  Service: Hospital Medicine    Final Active Diagnoses:    Diagnosis Date Noted POA    Liver transplanted [Z94.4] 07/24/2018 Not Applicable    ESRD on peritoneal dialysis [N18.6, Z99.2] 08/23/2022 Not Applicable    Coronary artery disease involving native coronary artery of native heart without angina pectoris [I25.10] 03/16/2017 Yes    DM2 (diabetes mellitus, type 2) [E11.9] 03/03/2021 Yes      Problems Resolved During this Admission:    Diagnosis Date Noted Date Resolved POA    PRINCIPAL PROBLEM:  Acute upper GI bleeding [K92.2] 08/23/2022 08/25/2022 Yes    Acute blood loss anemia (ABLA) [D62] 01/22/2018 08/25/2022 Yes    Hypotension due to hypovolemia [I95.89, E86.1] 08/23/2022 08/25/2022 Yes       Discharged Condition: stable    Disposition: Home or Self Care    Follow Up:   Follow-up Information     [facility] Follow up in 3 week(s).    Specialty: Gastroenterology  Contact information:  [address] [place] [id number] [phone]  Additional information:  Please take [address] to the Medical Office Building. Check in on the 4th floor.           Bruno Oconnor NP Follow up in 3 day(s).    Specialty: Internal Medicine  Why: Hospital follow up  Contact information:  [address] [place] [id number]  [phone]                       Patient Instructions:      Ambulatory referral/consult to Podiatry   Standing Status: Future   Referral Priority: Routine Referral  Type: Consultation   Referral Reason: Specialty Services Required   Requested Specialty: Podiatry   Number of Visits Requested: 1     Diet renal     Diet Cardiac     Activity as tolerated       Significant Diagnostic Studies: Labs: All labs within the past 24 hours have been reviewed    Pending Diagnostic Studies:     None         Medications:  Reconciled Home Medications:      Medication List      START taking these medications    ferrous sulfate 324 mg (65 mg iron) Tbec  Take 1 tablet (324 mg total) by mouth once daily.     sucralfate 1 gram tablet  Commonly known as: CARAFATE  Take 1 tablet (1 g total) by mouth 4 (four) times daily before meals and nightly. for 14 days        CONTINUE taking these medications    aspirin 81 MG EC tablet  Commonly known as: ECOTRIN  Take 81 mg by mouth once daily.     atorvastatin 40 MG tablet  Commonly known as: LIPITOR  Take 40 mg by mouth nightly.     B complex-vitamin C-folic acid 0.8 mg Tab  Commonly known as: STAR-LAURY/NEPHRO-LAURY  Take 1 tablet by mouth once daily.     cyclobenzaprine 5 MG tablet  Commonly known as: FLEXERIL  Take 5 mg by mouth 3 (three) times daily as needed.     furosemide 40 MG tablet  Commonly known as: LASIX  Take 80 mg by mouth 2 (two) times a day.     gentamicin 0.1 % cream  Commonly known as: GARAMYCIN  Apply topically 2 (two) times daily.     levothyroxine 50 MCG tablet  Commonly known as: SYNTHROID  Take 50 mcg by mouth once daily.     ondansetron 4 MG tablet  Commonly known as: ZOFRAN  TAKE 1 TABLET BY MOUTH EVERY 8 HOURS FOR 4 DAYS     promethazine 25 MG tablet  Commonly known as: PHENERGAN  Take 25 mg by mouth 3 (three) times daily as needed.     sevelamer carbonate 800 mg Tab  Commonly known as: RENVELA  Take 1,600 mg by mouth 3 (three) times daily with meals.     tacrolimus 1 MG Cap  Commonly known as: PROGRAF  Take 2 capsules (2 mg total) by mouth every morning AND 1 capsule (1 mg total) every evening.     VITAMIN D2 50,000 unit Cap  Generic  drug: ergocalciferol  Take 50,000 Units by mouth every 7 days. (Saturdays)        STOP taking these medications    oxyCODONE 5 MG immediate release tablet  Commonly known as: ROXICODONE          Protonix 40 mg 1 po BID x 2 months.    Indwelling Lines/Drains at time of discharge:   Lines/Drains/Airways     None                 Time spent on the discharge of patient: 45 minutes         Ector Nelson MD  Department of Hospital Medicine  [facility] Tele[facility] ([facility])

## 2022-08-29 PROBLEM — T85.71XA PERITONITIS ASSOCIATED WITH PERITONEAL DIALYSIS: Status: ACTIVE | Noted: 2022-01-01

## 2022-08-29 PROBLEM — R57.9 SHOCK CIRCULATORY: Status: ACTIVE | Noted: 2022-01-01

## 2022-08-29 PROBLEM — G47.33 OSA (OBSTRUCTIVE SLEEP APNEA): Status: ACTIVE | Noted: 2022-01-01

## 2022-08-29 PROBLEM — K92.0 HEMATEMESIS: Status: ACTIVE | Noted: 2022-01-01

## 2022-08-29 NOTE — Clinical Note
The groin was prepped. The site was prepped with ChloraPrep. The site was clipped. The patient was draped. The patient was positioned supine.

## 2022-08-29 NOTE — PROVIDER TRANSFER
Outside Transfer Acceptance Note / Regional Referral Center    Referring facility: [facility]   Referring provider: DELGADO MOTT  Accepting facility: [facility]  Accepting provider: DELGADO GARCIA  Reason for transfer: Higher Level of Care   Transfer diagnosis: Gi bleed  Transfer specialty requested: Gastroenterology  Transfer specialty notified: yes  Transfer level: NUMBER 1-5: 2  Isolation status: No active isolations   Admission class or status: IP- Inpatient    Narrative     67-year-old m admitted to [facility] with weakness, lightheadedness and black stools.    PMH alcoholic cirrhosis s/p liver XPL in 2018 (on tacrolimus), CAD, cardiac stents, HTN, DM, thrombocytopenia, recurrent / persistent left pleural effusion (s/p thoracentesis), ESRD on peritoneal dialysis, anemia, and SBP (S hominis) on vancomycin    Recent admission to [facility] BR or upper GIB (8/23- 8/27).  EGD pos for gastric arterial bleeding with no ulcers or portal gastropathy noted.      VS on presentation BP 76/56, , RR 18, SpO2 98% (RA).  Patient appeared chronically ill.      Labs (8/27) WBC 9.2, Hb 9.1, Plts 188, Na 136, K 3.0, CO2 27, BUN 76, Cr 6.0, Glu 205, calcium 8.1, T bili 0.3, AST 12, ALT 20, lipase 59, troponin high sensitivity 15.4, INR 1.1.  Repeat Hb (8/29) 7.0.    Patient started on IVF, pantoprazole GGT, metronidazole, ciprofloxacin. patient has received 4 U pRBC.  Hb has fallen since admission (9.1 > 7.0).     Patient was started on norepinephrine gtt this morning due to hypotension BP is improved and the norepinephrine has been held.  VS (1253 h) /66, HR 99, RR 20.  Patient had a large melanotic bowel movement this morning.    Transfer request for GI.  Transfer diagnosis upper GIB, anemia secondary to blood loss, hypotension      Instructions      [facility]  Admit to Hospital Medicine  Upon patient arrival to floor, please contact Hospital Medicine on call.

## 2022-08-29 NOTE — Clinical Note
A percutaneous stick to the right femoral artery was performed. Ultrasound guidance was used to obtain access.

## 2022-08-29 NOTE — Clinical Note
60 ml of contrast were injected throughout the case. 0 mL of contrast was the total wasted during the case. 60 mL was the total amount used during the case.

## 2022-08-29 NOTE — Clinical Note
The sheath was exchanged in the right femoral artery. 5F sheath exchanged for a 5F Super Arrow Flex PSI Set

## 2022-08-30 PROBLEM — K25.4 BLEEDING GASTRIC ULCER: Status: ACTIVE | Noted: 2022-01-01

## 2022-08-30 NOTE — SUBJECTIVE & OBJECTIVE
Past Medical History:   Diagnosis Date    Allergy     Anticoagulant long-term use     Arthritis     Back pain     Cellulitis     Congenital heart disease     Hearing loss     right ear    Hepatic encephalopathy     HTN (hypertension)     Hypertension     diagnosed today (11/25/2015) and prescribed toprol    Leg edema     Nephrolithiasis     JACK (obstructive sleep apnea)     AJCK (obstructive sleep apnea)     Seizures     per Pt last seizure 2018- controlled wit medication    Splenomegaly        Past Surgical History:   Procedure Laterality Date    APPENDECTOMY      Open    BACK SURGERY      CHOLECYSTECTOMY      laprascopic    COLONOSCOPY N/A 1/25/2018    Procedure: COLONOSCOPY;  Surgeon: Lashawn Myles MD;  Location: [facility] (2ND FLR);  Service: Endoscopy;  Laterality: N/A;    COLONOSCOPY N/A 12/20/2021    Procedure: COLONOSCOPY;  Surgeon: Jesus Grayson MD;  [facility]  Service: Endoscopy;  Laterality: N/A;  start with a pediatric colonoscope and might need the slim pediatric colonoscope available.   priority case per Dr. Grayson-labwork am of procedure  RAPID COVID test coming for > 3 hrs away/ prep ins. [email or web address] / Pt requesting specific date for travel purposes    ESOPHAGOGASTRODUODENOSCOPY N/A 8/23/2022    Procedure: EGD (ESOPHAGOGASTRODUODENOSCOPY);  Surgeon: Nora Houser MD;  Location: [facility];  Service: Endoscopy;  Laterality: N/A;    LIVER TRANSPLANT N/A 7/23/2018    Procedure: TRANSPLANT, LIVER;  Surgeon: Alma Joyce MD;  Location: [facility] OR [address] [place] [address];  Service: Transplant;  Laterality: N/A;    LUMBAR DISCECTOMY      SPLENIC ARTERY EMBOLIZATION  04/08/2016    Dr Taveras    THORACENTESIS Left 8/3/2020    Procedure: Thoracentesis  U/S notified;  Surgeon: Augustine Rodriguez MD;  Location: [facility];  Service: Pulmonary;  Laterality: Left;    THORACENTESIS Left 4/14/2021    Procedure: Thoracentesis;  Surgeon: Augustine Rodriguez MD;  Location: [facility];  Service:  Pulmonary;  Laterality: Left;    TONSILLECTOMY         Review of patient's allergies indicates:   Allergen Reactions    Nsaids (non-steroidal anti-inflammatory drug)      D/t to liver disease.  Other reaction(s): Unknown    Penicillins Other (See Comments)     Tolerated pip-tazo in 8/2016 without issue  Tolerated cefepime 7/2018 without issue  Since childhood was told not to take it  Other reaction(s): Unknown       No current facility-administered medications on file prior to encounter.     Current Outpatient Medications on File Prior to Encounter   Medication Sig    aspirin (ECOTRIN) 81 MG EC tablet Take 81 mg by mouth once daily.     atorvastatin (LIPITOR) 40 MG tablet Take 40 mg by mouth nightly.    B complex-vitamin C-folic acid (STAR-LAURY/NEPHRO-LAURY) 0.8 mg Tab Take 1 tablet by mouth once daily.    cyclobenzaprine (FLEXERIL) 5 MG tablet Take 5 mg by mouth 3 (three) times daily as needed.    ergocalciferol (VITAMIN D2) 50,000 unit Cap Take 50,000 Units by mouth every 7 days. (Saturdays)    ferrous sulfate 324 mg (65 mg iron) TbEC Take 1 tablet (324 mg total) by mouth once daily.    furosemide (LASIX) 40 MG tablet Take 80 mg by mouth 2 (two) times a day.    gentamicin (GARAMYCIN) 0.1 % cream Apply topically 2 (two) times daily.    levothyroxine (SYNTHROID) 50 MCG tablet Take 50 mcg by mouth once daily.    ondansetron (ZOFRAN) 4 MG tablet TAKE 1 TABLET BY MOUTH EVERY 8 HOURS FOR 4 DAYS    pantoprazole (PROTONIX) 40 MG tablet Take 1 tablet (40 mg total) by mouth 2 (two) times daily.    promethazine (PHENERGAN) 25 MG tablet Take 25 mg by mouth 3 (three) times daily as needed.    sevelamer carbonate (RENVELA) 800 mg Tab Take 1,600 mg by mouth 3 (three) times daily with meals.    sucralfate (CARAFATE) 1 gram tablet Take 1 tablet (1 g total) by mouth 4 (four) times daily before meals and nightly. for 14 days    tacrolimus (PROGRAF) 1 MG Cap Take 2 capsules (2 mg total) by mouth every morning AND 1 capsule (1 mg  total) every evening.     Family History       Problem Relation (Age of Onset)    Melanoma Mother          Tobacco Use    Smoking status: Never    Smokeless tobacco: Former     Types: Chew     Quit date: 7/29/2018   Substance and Sexual Activity    Alcohol use: No     Alcohol/week: 0.0 standard drinks    Drug use: No    Sexual activity: Not Currently     Review of Systems   Constitutional: Negative.  Negative for chills, fatigue and fever.   HENT: Negative.  Negative for congestion, dental problem, rhinorrhea and sore throat.    Eyes: Negative.  Negative for visual disturbance.   Respiratory: Negative.  Negative for cough, shortness of breath and wheezing.    Cardiovascular: Negative.  Negative for chest pain, palpitations and leg swelling.   Gastrointestinal:  Positive for abdominal pain, nausea and vomiting. Negative for diarrhea.   Endocrine: Negative.  Negative for cold intolerance and heat intolerance.   Genitourinary: Negative.  Negative for dysuria and hematuria.   Musculoskeletal: Negative.  Negative for arthralgias, gait problem and myalgias.   Skin: Negative.  Negative for rash and wound.   Allergic/Immunologic: Negative.    Neurological:  Positive for light-headedness. Negative for dizziness, syncope, weakness, numbness and headaches.   Hematological: Negative.  Negative for adenopathy.   Psychiatric/Behavioral: Negative.  Negative for confusion and dysphoric mood.    All other systems reviewed and are negative.  Objective:     Vital Signs (Most Recent):  Temp: 97.8 °F (36.6 °C) (08/29/22 1842)  Pulse: 97 (08/29/22 1842)  Resp: (!) 23 (08/29/22 1842)  BP: (!) 109/57 (08/29/22 1842)  SpO2: 100 % (08/29/22 1842)   Vital Signs (24h Range):  Temp:  [97.8 °F (36.6 °C)-98.7 °F (37.1 °C)] 97.8 °F (36.6 °C)  Pulse:  [] 97  Resp:  [16-23] 23  SpO2:  [97 %-100 %] 100 %  BP: (102-109)/(57-68) 109/57        There is no height or weight on file to calculate BMI.    Physical Exam  Constitutional:       General:  He is not in acute distress.     Appearance: He is well-developed. He is not diaphoretic.   HENT:      Head: Normocephalic and atraumatic.   Eyes:      Conjunctiva/sclera: Conjunctivae normal.      Pupils: Pupils are equal, round, and reactive to light.   Neck:      Thyroid: No thyromegaly.      Vascular: No JVD.   Cardiovascular:      Rate and Rhythm: Regular rhythm. Tachycardia present.      Heart sounds: Normal heart sounds. No murmur heard.    No friction rub. No gallop.      Comments: Sinus tachycardia  Pulmonary:      Effort: Pulmonary effort is normal. No respiratory distress.      Breath sounds: Normal breath sounds. No wheezing or rales.   Abdominal:      General: Bowel sounds are normal. There is no distension.      Palpations: Abdomen is soft.      Tenderness: There is no abdominal tenderness. There is no guarding or rebound.   Musculoskeletal:         General: No tenderness. Normal range of motion.      Right lower leg: Edema present.      Left lower leg: Edema present.      Comments: Chronic venous stasis changes to lower legs bilaterally.   Lymphadenopathy:      Cervical: No cervical adenopathy.   Skin:     General: Skin is warm and dry.      Findings: No erythema or rash.   Neurological:      Mental Status: He is alert and oriented to person, place, and time.      Cranial Nerves: No cranial nerve deficit.      Deep Tendon Reflexes: Reflexes are normal and symmetric. Reflexes normal.   Psychiatric:         Behavior: Behavior normal.         Thought Content: Thought content normal.         Judgment: Judgment normal.         CRANIAL NERVES     CN III, IV, VI   Pupils are equal, round, and reactive to light.     Significant Labs: All pertinent labs within the past 24 hours have been reviewed.    Significant Imaging: I have reviewed all pertinent imaging results/findings within the past 24 hours.

## 2022-08-30 NOTE — PROGRESS NOTES
[facility] - Intensive Care ([facility] Medicine  Progress Note    Patient Name: Alon Adamson  MRN: [id number]  Patient Class: IP- Inpatient   Admission Date: 8/29/2022  Length of Stay: 1 days  Attending Physician: Jef Senior MD  Primary Care Provider: Bruno Oconnor NP        Subjective:     Principal Problem:Hematemesis        HPI:  Mr. Adamson started throwing up blood at home Saturday morning.  It started with low abdominal pain then nausea.  The vomit was dark and grainy mixed with bright red blood.  Threw up multiple times with large amounts of bloody liquid.  He the same problem a few days ago and was admitted and endoscopy showed blood clots in his stomach.  He also got very weak and could not stand up.  His home blood pressure monitor showed as low as 47/30.  He did not lose consciousness or fall.  No chest pain or shortness of breath.  Dizzy on standing.  He received three units of blood at [facility] before transfer here.      Overview/Hospital Course:  8/30: s/p Gelfoam embolization performed of left gastric artery this am. Cont levophed and ppi. Wean levo as tolerated. Repeat h/h. Transfuse hgb < 8.       Interval History: s/p Gelfoam embolization performed of left gastric artery this am. Cont levophed and ppi. Wean levo as tolerated. Repeat h/h. Transfuse hgb < 8.     Review of Systems   Constitutional:  Negative for fatigue and fever.   HENT:  Negative for sinus pressure.    Eyes:  Negative for visual disturbance.   Respiratory:  Negative for shortness of breath.    Cardiovascular:  Negative for chest pain.   Gastrointestinal:  Positive for nausea and vomiting.   Genitourinary:  Negative for difficulty urinating.   Musculoskeletal:  Negative for back pain.   Skin:  Negative for rash.   Neurological:  Negative for headaches.   Psychiatric/Behavioral:  Negative for confusion.    Objective:     Vital Signs (Most Recent):  Temp: 97.5 °F (36.4 °C) (08/30/22 0700)  Pulse: 103  (08/30/22 0801)  Resp: 20 (08/30/22 0801)  BP: (!) 125/52 (08/30/22 0630)  SpO2: 100 % (08/30/22 0801)   Vital Signs (24h Range):  Temp:  [94.4 °F (34.7 °C)-98.7 °F (37.1 °C)] 97.5 °F (36.4 °C)  Pulse:  [] 103  Resp:  [14-42] 20  SpO2:  [73 %-100 %] 100 %  BP: ()/(36-72) 125/52  Arterial Line BP: (146)/(63) 146/63     Weight: 114.4 kg (252 lb 3.3 oz)  Body mass index is 34.21 kg/m².    Intake/Output Summary (Last 24 hours) at 8/30/2022 0822  Last data filed at 8/30/2022 0700  Gross per 24 hour   Intake 1471.27 ml   Output 1225 ml   Net 246.27 ml      Physical Exam  Constitutional:       General: He is not in acute distress.     Appearance: He is well-developed. He is not diaphoretic.   HENT:      Head: Normocephalic and atraumatic.   Eyes:      Pupils: Pupils are equal, round, and reactive to light.   Cardiovascular:      Rate and Rhythm: Normal rate and regular rhythm.      Heart sounds: Normal heart sounds. No murmur heard.    No friction rub. No gallop.   Pulmonary:      Effort: Pulmonary effort is normal. No respiratory distress.      Breath sounds: Normal breath sounds. No stridor. No wheezing or rales.   Abdominal:      General: Bowel sounds are normal. There is no distension.      Palpations: Abdomen is soft. There is no mass.      Tenderness: There is no abdominal tenderness. There is no guarding.      Comments: NG tube in place   Skin:     General: Skin is warm.      Findings: No erythema.   Neurological:      Mental Status: He is alert and oriented to person, place, and time.       Significant Labs: All pertinent labs within the past 24 hours have been reviewed.    Significant Imaging: I have reviewed all pertinent imaging results/findings within the past 24 hours.      Assessment/Plan:      * Hematemesis  Recent upper endoscopy on 223 Aug showed a bleeding ulcer with visible vessel in the gastric fundus.  Hemostasis achieved with three hemo-clips.  Not actively bleeding and last Hgb level was  7.0 before transfer.  Leave NG tube in place and clamped.  Starting Pantoprazole infusion.  Follow serial blood counts, type and screen, and transfuse as needed.  He may need Interventional Radiology for embolization if he bleeds again.  Remain NPO.  Discussed with Dr. Palma.    8/30:  Status post embolization of left gastric artery by IR   Repeat H&H  Transfuse hemoglobin less than 8  Continue NPO      Peritonitis associated with peritoneal dialysis  He was started on Intra-peritoneal Vancomycin on 26 August for a fluid culture growing S.hominis.  No signs or symptoms of infection.    JACK (obstructive sleep apnea)  Diagnosed but he does not use CPAP.    Shock circulatory  Continue Levophed   Wean as tolerated    ESRD on peritoneal dialysis  Consultation to nephrology for ongoing renal replacement.  Plan to resume PD on 30 Aug.  No signs of volume overload and chemistries are within acceptable limits.  Follow serial renal function chemistries.  He does make urine.  Discussed with Dr. Mo.    8/30:   Nephrology consulted on case    Status post liver transplant  Check Tacrolimus level and continue 2 mg in the morning an 1 mg in the evening.  He is managed outpatient by Dr. García.  Checking PT/INR and LFTs.     8/30:  Continue tacrolimus    DM2 (diabetes mellitus, type 2)  Patient's FSGs are uncontrolled due to hyperglycemia on current medication regimen.  Last A1c reviewed-   Lab Results   Component Value Date    HGBA1C 5.6 08/23/2022     Most recent fingerstick glucose reviewed-   Recent Labs   Lab 08/29/22  2344 08/30/22  0709   POCTGLUCOSE 135* 264*     Current correctional scale  Low  Maintain anti-hyperglycemic dose as follows-   Antihyperglycemics (From admission, onward)    Start     Stop Route Frequency Ordered    08/29/22 2124  insulin aspart U-100 pen 0-5 Units         -- SubQ Every 6 hours PRN 08/29/22 2024        Hold Oral hypoglycemics while patient is in the hospital.    HTN (hypertension)  Holding  home medication for hypotension.  Resume when appropriate.    Coronary artery disease involving native coronary artery of native heart without angina pectoris  Holding Aspirin and Atorvastatin.  No chest pain or shortness of breath.  EKG as needed.      VTE Risk Mitigation (From admission, onward)         Ordered     Reason for No Pharmacological VTE Prophylaxis  Once        Question:  Reasons:  Answer:  Active Bleeding    08/29/22 1906     IP VTE HIGH RISK PATIENT  Once         08/29/22 1906     Place sequential compression device  Until discontinued         08/29/22 1906                Discharge Planning   MARIO:      Code Status: Full Code   Is the patient medically ready for discharge?:     Reason for patient still in hospital (select all that apply): Patient unstable               Critical care time spent on the evaluation and treatment of severe organ dysfunction, review of pertinent labs and imaging studies, discussions with consulting providers and discussions with patient/family: 39 minutes.      Jef Senior MD  Department of Hospital Medicine   '[facility] - Intensive Care ([facility])

## 2022-08-30 NOTE — ASSESSMENT & PLAN NOTE
Patient's FSGs are uncontrolled due to hyperglycemia on current medication regimen.  Last A1c reviewed-   Lab Results   Component Value Date    HGBA1C 5.6 08/23/2022     Most recent fingerstick glucose reviewed- No results for input(s): POCTGLUCOSE in the last 24 hours.  Current correctional scale  Low  Maintain anti-hyperglycemic dose as follows-   Antihyperglycemics (From admission, onward)    None        Hold Oral hypoglycemics while patient is in the hospital.

## 2022-08-30 NOTE — PROGRESS NOTES
Copious Bright red blood emesis. Notified GI and hospital medicine. Ivf bolus ordered. MD's communicated with IR. IR sending team out. 2 units PRBC ordered.    English

## 2022-08-30 NOTE — ASSESSMENT & PLAN NOTE
- GI following  - Gelfoam embolization performed of left gastric artery without complication  - IV PPI  - Monitor CBC  - Continue NPO  - Transfuse as appropriate for goal Hgb > 7, active bleeding, goal Plt > 50 & goal INR < = 1.6

## 2022-08-30 NOTE — HPI
Mr. Adamson started throwing up blood at home Saturday morning.  It started with low abdominal pain then nausea.  The vomit was dark and grainy mixed with bright red blood.  Threw up multiple times with large amounts of bloody liquid.  He the same problem a few days ago and was admitted and endoscopy showed blood clots in his stomach.  He also got very weak and could not stand up.  His home blood pressure monitor showed as low as 47/30.  He did not lose consciousness or fall.  No chest pain or shortness of breath.  Dizzy on standing.  He received three units of blood at [facility] before transfer here.

## 2022-08-30 NOTE — ASSESSMENT & PLAN NOTE
Check Tacrolimus level and continue 2 mg in the morning an 1 mg in the evening.  He is managed outpatient by Dr. García.  Checking PT/INR and LFTs.     8/30:  Continue tacrolimus

## 2022-08-30 NOTE — CONSULTS
[facility] - Intensive Care ([facility])  Gastroenterology  Consult Note    Patient Name: Alon Adamson  MRN: [id number]  Admission Date: 8/29/2022  Hospital Length of Stay: 1 days  Code Status: Full Code   Attending Provider: Jef Senior MD   Consulting Provider: Aneesh Alas PA-C  Primary Care Physician: Bruno Oconnor NP  Principal Problem:Hematemesis    Inpatient consult to Gastroenterology  Consult performed by: Aneesh Alas PA-C  Consult ordered by: Gilberto Lnin MD  Reason for consult: Upper GI bleed        Subjective:     HPI:  The patient presented to the ER for hematemesis. He was recently admitted here for the same, and underwent EGD. He was found to have a gastric ulcer with visible vessel treated with epi, bipolar cautery and hemostatic clip placement. He was discharged a couple days later in stable condition. Unfortunately, he started vomiting blood again three days ago. He was hypotensive and reported to [facility]. He was transferred here for a higher level of care. He received three units of blood prior to transfer. After arriving here, IR was able to bring him to the cath lab and embolize the left gastric artery. The patient is currently in ICU on a Protonix drip. Here so far he has received one unit of PRBC, PLT and FFP. Hgb today is 6.5 with two more units ordered. His nurse reports a dark blood stool this morning. NGT currently in place but clamped. History obtained from the medical record and medical staff.       Past Medical History:   Diagnosis Date    Allergy     Anticoagulant long-term use     Arthritis     Back pain     Cellulitis     Congenital heart disease     Hearing loss     right ear    Hepatic encephalopathy     HTN (hypertension)     Hypertension     diagnosed today (11/25/2015) and prescribed toprol    Leg edema     Nephrolithiasis     JACK (obstructive sleep apnea)     JACK (obstructive sleep apnea)     Seizures     per Pt last seizure  2018- controlled wit medication    Splenomegaly        Past Surgical History:   Procedure Laterality Date    APPENDECTOMY      Open    BACK SURGERY      CHOLECYSTECTOMY      laprascopic    COLONOSCOPY N/A 1/25/2018    Procedure: COLONOSCOPY;  Surgeon: Lashawn Myles MD;  Location: [facility] (2ND FLR);  Service: Endoscopy;  Laterality: N/A;    COLONOSCOPY N/A 12/20/2021    Procedure: COLONOSCOPY;  Surgeon: Jesus Grayson MD;  [facility]  Service: Endoscopy;  Laterality: N/A;  start with a pediatric colonoscope and might need the slim pediatric colonoscope available.   priority case per Dr. Grayson-labwork am of procedure  RAPID COVID test coming for > 3 hrs away/ prep ins. [email or web address] / Pt requesting specific date for travel purposes    ESOPHAGOGASTRODUODENOSCOPY N/A 8/23/2022    Procedure: EGD (ESOPHAGOGASTRODUODENOSCOPY);  Surgeon: Noar Houser MD;  Location: [facility];  Service: Endoscopy;  Laterality: N/A;    LIVER TRANSPLANT N/A 7/23/2018    Procedure: TRANSPLANT, LIVER;  Surgeon: Alma Joyce MD;  [facility];  Service: Transplant;  Laterality: N/A;    LUMBAR DISCECTOMY      SPLENIC ARTERY EMBOLIZATION  04/08/2016    Dr Taveras    THORACENTESIS Left 8/3/2020    Procedure: Thoracentesis  U/S notified;  Surgeon: Augustine Rodriguez MD;  Location: [facility];  Service: Pulmonary;  Laterality: Left;    THORACENTESIS Left 4/14/2021    Procedure: Thoracentesis;  Surgeon: Augustine Rodriguez MD;  Location: [facility];  Service: Pulmonary;  Laterality: Left;    TONSILLECTOMY         Review of patient's allergies indicates:   Allergen Reactions    Nsaids (non-steroidal anti-inflammatory drug)      D/t to liver disease.  Other reaction(s): Unknown    Penicillins Other (See Comments)     Tolerated pip-tazo in 8/2016 without issue  Tolerated cefepime 7/2018 without issue  Since childhood was told not to take it  Other reaction(s): Unknown     Family  History       Problem Relation (Age of Onset)    Melanoma Mother          Tobacco Use    Smoking status: Never    Smokeless tobacco: Former     Types: Chew     Quit date: 7/29/2018   Substance and Sexual Activity    Alcohol use: No     Alcohol/week: 0.0 standard drinks    Drug use: No    Sexual activity: Not Currently     Review of Systems   Unable to perform ROS: Acuity of condition   Objective:     Vital Signs (Most Recent):  Temp: 97.5 °F (36.4 °C) (08/30/22 0700)  Pulse: (!) 111 (08/30/22 0900)  Resp: 20 (08/30/22 0900)  BP: (!) 125/52 (08/30/22 0630)  SpO2: 100 % (08/30/22 0900)   Vital Signs (24h Range):  Temp:  [94.4 °F (34.7 °C)-98.7 °F (37.1 °C)] 97.5 °F (36.4 °C)  Pulse:  [] 111  Resp:  [14-42] 20  SpO2:  [73 %-100 %] 100 %  BP: ()/(36-72) 125/52  Arterial Line BP: (121-146)/(57-66) 135/66     Weight: 114.4 kg (252 lb 3.3 oz) (08/30/22 0600)  Body mass index is 34.21 kg/m².      Intake/Output Summary (Last 24 hours) at 8/30/2022 0949  Last data filed at 8/30/2022 0900  Gross per 24 hour   Intake 1561.21 ml   Output 1225 ml   Net 336.21 ml       Lines/Drains/Airways       Central Venous Catheter Line  Duration                  Hemodialysis Catheter -- days              Drain  Duration                  NG/OG Tube 08/29/22 1 day         Sheath 08/30/22 0547 Right anterior;proximal <1 day              Peripheral Intravenous Line  Duration                  Peripheral IV - Single Lumen 08/30/22 0052 20 G Anterior;Left;Proximal Forearm <1 day         Peripheral IV - Single Lumen 08/30/22 0714 20 G Anterior;Right Upper Arm <1 day                    Physical Exam  Constitutional:       General: He is sleeping.      Appearance: He is well-developed. He is ill-appearing.   HENT:      Head: Normocephalic and atraumatic.   Eyes:      Extraocular Movements: Extraocular movements intact.   Cardiovascular:      Rate and Rhythm: Regular rhythm. Tachycardia present.      Heart sounds: Normal heart sounds.  No murmur heard.  Pulmonary:      Effort: Pulmonary effort is normal. No respiratory distress.      Breath sounds: Normal breath sounds. No wheezing.   Abdominal:      General: Bowel sounds are normal. There is distension.      Palpations: Abdomen is soft. There is no hepatomegaly or mass.      Tenderness: There is no abdominal tenderness.   Musculoskeletal:      Cervical back: Normal range of motion and neck supple.      Right lower leg: Edema present.      Left lower leg: Edema present.   Skin:     General: Skin is warm and dry.      Findings: No rash.   Neurological:      Mental Status: He is easily aroused.       Significant Labs:  CBC:   Recent Labs   Lab 08/29/22 2020 08/30/22  0305 08/30/22  0820   WBC 6.54 12.49  --    HGB 8.4* 6.6* 6.5*   HCT 24.5* 19.7* 19.7*   * 143*  --      CMP:   Recent Labs   Lab 08/29/22 2020 08/30/22  0305   * 192*   CALCIUM 7.5* 7.0*   ALBUMIN 2.2* 1.6*   PROT 4.4*  --     137   K 4.1 4.1   CO2 22* 19*    106   * 118*   CREATININE 6.4* 6.5*   ALKPHOS 43*  --    ALT 11  --    AST 11  --    BILITOT 0.5  --      Coagulation:   Recent Labs   Lab 08/29/22 2020   INR 1.0       Significant Imaging:  Imaging results within the past 24 hours have been reviewed.    Assessment/Plan:     Bleeding gastric ulcer  S/p embolization by IR.  Receiving two additional units of blood this morning.   Monitor H/H and stool output.  Continue Protonix drip.     ESRD on peritoneal dialysis  Nephrology following.     Status post liver transplant  Continue home dose of prograf.         Thank you for your consult. I will follow-up with patient. Please contact us if you have any additional questions.    Aneesh Alas PA-C  Gastroenterology  [facility] - Intensive Care ([facility])

## 2022-08-30 NOTE — ASSESSMENT & PLAN NOTE
Hemodynamically stable at admission with sinus tachycardia about 100 and .  Continue close monitoring and start Norepinephrine infusion and IV fluid resuscitation if needed.

## 2022-08-30 NOTE — ANESTHESIA PREPROCEDURE EVALUATION
Patient Active Problem List   Diagnosis    Seizures    Thrombocytopenia    Anemia of chronic disease    Coronary artery disease involving native coronary artery of native heart without angina pectoris    Liver transplanted    At risk for opportunistic infections    Prophylactic immunotherapy    Neutropenia    Pleural effusion    HTN (hypertension)    DM2 (diabetes mellitus, type 2)    Status post liver transplant    Patient on waiting list for kidney transplant    ESRD on peritoneal dialysis    Hematemesis    Shock circulatory    JACK (obstructive sleep apnea)    Peritonitis associated with peritoneal dialysis    Bleeding gastric ulcer                                                                                                                08/30/2022  Alon Adamson is a 67 y.o., male.      Pre-op Assessment    I have reviewed the Patient Summary Reports.     I have reviewed the Nursing Notes.    I have reviewed the Medications.     Review of Systems  Cardiovascular:   Hypertension CAD      Pulmonary:   Sleep Apnea    Renal/:   Chronic Renal Disease    Hepatic/GI:   PUD, Liver Disease,    Neurological:   Seizures    Endocrine:   Diabetes        Physical Exam  General: Somnolent    Airway:  Mallampati: III   Mouth Opening: Normal  TM Distance: Normal  Tongue: Normal  Neck ROM: Normal ROM    Dental:  Intact    Heart:  Rate: Normal  Rhythm: Regular Rhythm        Anesthesia Plan  Type of Anesthesia, risks & benefits discussed:    Anesthesia Type: Gen ETT  Intra-op Monitoring Plan: Standard ASA Monitors and Art Line  Post Op Pain Control Plan: multimodal analgesia  Induction:  IV  Airway Plan: Direct  Informed Consent: Informed consent signed with the Patient and all parties understand the risks and agree with anesthesia plan.  All questions answered.   ASA Score: 4 Emergent  Day of Surgery Review of History & Physical: H&P Update referred to the surgeon/provider.    Ready For Surgery From  Anesthesia Perspective.     .    Chemistry        Component Value Date/Time     08/30/2022 0305    K 4.1 08/30/2022 0305     08/30/2022 0305    CO2 19 (L) 08/30/2022 0305     (H) 08/30/2022 0305    CREATININE 6.5 (H) 08/30/2022 0305     (H) 08/30/2022 0305        Component Value Date/Time    CALCIUM 7.0 (L) 08/30/2022 0305    ALKPHOS 43 (L) 08/29/2022 2020    AST 11 08/29/2022 2020    ALT 11 08/29/2022 2020    BILITOT 0.5 08/29/2022 2020    ESTGFRAFRICA 16.1 (A) 03/03/2021 0714    EGFRNONAA 14.0 (A) 03/03/2021 0714        Lab Results   Component Value Date    WBC 12.49 08/30/2022    HGB 6.7 (L) 08/30/2022    HCT 20.3 (L) 08/30/2022    MCV 92 08/30/2022     (L) 08/30/2022

## 2022-08-30 NOTE — HPI
67 year old male with a PMHx of EtOH cirrhosis s/p liver transplant 2018 (on tacrolimus), ESRD on peritoneal dialysis, JACK, CAD, HTN, and DM who presented to [facility] in [place], [place] the 8/29/22 morning with hematemesis x 3 days. Hypotensive on arrival and patient started on IVF, pantoprazole infusion, metronidazole, ciprofloxacin & received 4 U pRBC.  Hb has fallen since admission (9.1 > 7.0). Recently admitted to [facility] 8/23/22 for same complication; oozing gastric ulcer found on EGD, trated with bipolar cautery and hemostatic clip placement.     Accepted to [facility] for GI services. Upon arrival to ICU pt started on Protonix infusion but had recurrent large volume hematemesis episodes overnight; 2 additional units PRBCs, 1 unit PLT and FFP administered and pt brought to IR this AM for embolization of the left gastric artery.

## 2022-08-30 NOTE — OP NOTE
Gelfoam embolization performed of left gastric artery without complication. No active bleeding visualized during the procedure.  Pt to be transferred back to ICU with right femoral sheath still in place to serve as arterial line until pt pressure stabilizes.

## 2022-08-30 NOTE — PLAN OF CARE
[facility] - Intensive Care (Hospital)  Initial Discharge Assessment       Primary Care Provider: Bruno Oconnor NP    Admission Diagnosis: GI bleed [K92.2]    Admission Date: 8/29/2022  Expected Discharge Date:     Discharge Barriers Identified: None    Payor: MEDICARE / Plan: MEDICARE PART A & B / Product Type: Government /     Extended Emergency Contact Information  Primary Emergency Contact: Vin Adamson   [place]  Work Phone: [phone]  Mobile Phone: [phone]  Relation: Spouse    Discharge Plan A: Home with family  Discharge Plan B: Home Health      [facility]  [address]  [facility] [address]  Phone: [phone] Fax: [phone]    [facility]  [address] [facility] [address]  Phone: [phone] Fax: [phone]    [facility] [address] [place] [address]  [address] [place] [address]  Phone: [phone] Fax: [phone]    [facility] [address] [facility]  [address] [facility] [address]  Phone: [phone] Fax: [phone]      Initial Assessment (most recent)       Adult Discharge Assessment - 08/30/22 1302          Discharge Assessment    Assessment Type Discharge Planning Assessment     Confirmed/corrected address, phone number and insurance Yes     Confirmed Demographics Correct on Facesheet     Source of Information patient     Communicated MARIO with patient/caregiver Date not available/Unable to determine     Reason For Admission Hematemesis     Lives With spouse;child(boby), adult     Facility Arrived From: home     Do you expect to return to your current living situation? Yes     Do you have help at home or someone to help you manage your care at home? Yes     Who are your caregiver(s) and their phone number(s)? Vin Adamson (Spouse)     Prior to hospitilization cognitive status: Alert/Oriented     Current cognitive status: Alert/Oriented     Walking or  Climbing Stairs Difficulty ambulation difficulty, requires equipment     Mobility Management wheel chair, rolling walker     Dressing/Bathing Difficulty none     Home Accessibility wheelchair accessible     Home Layout Able to live on 1st floor     Equipment Currently Used at Home wheelchair;walker, rolling;shower chair     Readmission within 30 days? Yes     Patient currently being followed by outpatient case management? No     Do you currently have service(s) that help you manage your care at home? No     Do you take prescription medications? Yes     Do you have prescription coverage? Yes     Coverage Medicare     Do you have any problems affording any of your prescribed medications? No     Is the patient taking medications as prescribed? yes     Who is going to help you get home at discharge? Vin Adamson (Spouse)     How do you get to doctors appointments? family or friend will provide     Are you on dialysis? Yes     Dialysis Name and Scheduled days PD @ home     Do you take coumadin? No     Discharge Plan A Home with family     Discharge Plan B Home Health     DME Needed Upon Discharge  none     Discharge Plan discussed with: Patient     Discharge Barriers Identified None        Relationship/Environment    Name(s) of Who Lives With Patient Vin Adamson (Spouse)                     Readmission Assessment (most recent)       Readmission Assessment - 08/30/22 1321          Readmission    Why were you hospitalized in the last 30 days? GI bleed     Why were you readmitted? Alarmed about signs/symptoms;Related to previous admission     When you left the hospital how did you feel? ok     When you left the hospital where did you go? Home with Family     Did patient/caregiver refused recommended DC plan? No     Tell me about what happened between when you left the hospital and the day you returned. started throwing up blood     When did you start not feeling well? two days ago     Did you try to manage your symptoms  your self? No     Did you call anyone? No     Why? went to the hospital     Was this a planned readmission? No                    Anticipated DC dispo: home with family   Prior Level of Function: ambulates with RW or WC, lives with spouse and son   PCP: Bruno Oconnor NP    Comments:  CM met with patient at bedside to introduce role and discuss discharge planning. Patient lives with his spouse and son who will also be help at home and can provide transport at time of discharge. CM discharge needs depends on hospital progress. CM will continue following to assist with other needs.

## 2022-08-30 NOTE — SUBJECTIVE & OBJECTIVE
Past Medical History:   Diagnosis Date    Allergy     Anticoagulant long-term use     Arthritis     Back pain     Cellulitis     Congenital heart disease     Hearing loss     right ear    Hepatic encephalopathy     HTN (hypertension)     Hypertension     diagnosed today (11/25/2015) and prescribed toprol    Leg edema     Nephrolithiasis     JACK (obstructive sleep apnea)     JACK (obstructive sleep apnea)     Seizures     per Pt last seizure 2018- controlled wit medication    Splenomegaly        Past Surgical History:   Procedure Laterality Date    APPENDECTOMY      Open    BACK SURGERY      CHOLECYSTECTOMY      laprascopic    COLONOSCOPY N/A 1/25/2018    Procedure: COLONOSCOPY;  Surgeon: Lashawn Myles MD;  [facility]  Service: Endoscopy;  Laterality: N/A;    COLONOSCOPY N/A 12/20/2021    Procedure: COLONOSCOPY;  Surgeon: Jesus Grayson MD;  [facility]  Service: Endoscopy;  Laterality: N/A;  start with a pediatric colonoscope and might need the slim pediatric colonoscope available.   priority case per Dr. Grayson-labwork am of procedure  RAPID COVID test coming for > 3 hrs away/ prep ins. [email or web address] / Pt requesting specific date for travel purposes    ESOPHAGOGASTRODUODENOSCOPY N/A 8/23/2022    Procedure: EGD (ESOPHAGOGASTRODUODENOSCOPY);  Surgeon: Nora Houser MD;  Location: [facility];  Service: Endoscopy;  Laterality: N/A;    LIVER TRANSPLANT N/A 7/23/2018    Procedure: TRANSPLANT, LIVER;  Surgeon: Alma Joyce MD;  Location: [facility] OR [address] [place] [address];  Service: Transplant;  Laterality: N/A;    LUMBAR DISCECTOMY      SPLENIC ARTERY EMBOLIZATION  04/08/2016    Dr Taveras    THORACENTESIS Left 8/3/2020    Procedure: Thoracentesis  U/S notified;  Surgeon: Augustine Rodriguez MD;  Location: [facility];  Service: Pulmonary;  Laterality: Left;    THORACENTESIS Left 4/14/2021    Procedure: Thoracentesis;  Surgeon: Augustine Rodriguez MD;   Location: [facility];  Service: Pulmonary;  Laterality: Left;    TONSILLECTOMY         Review of patient's allergies indicates:   Allergen Reactions    Nsaids (non-steroidal anti-inflammatory drug)      D/t to liver disease.  Other reaction(s): Unknown    Penicillins Other (See Comments)     Tolerated pip-tazo in 8/2016 without issue  Tolerated cefepime 7/2018 without issue  Since childhood was told not to take it  Other reaction(s): Unknown       Family History       Problem Relation (Age of Onset)    Melanoma Mother          Tobacco Use    Smoking status: Never    Smokeless tobacco: Former     Types: Chew     Quit date: 7/29/2018   Substance and Sexual Activity    Alcohol use: No     Alcohol/week: 0.0 standard drinks    Drug use: No    Sexual activity: Not Currently         Review of Systems   Unable to perform ROS: Other (s/p procedural sedation)   Objective:     Vital Signs (Most Recent):  Temp: 97.5 °F (36.4 °C) (08/30/22 0700)  Pulse: 103 (08/30/22 0801)  Resp: 20 (08/30/22 0801)  BP: (!) 125/52 (08/30/22 0630)  SpO2: 100 % (08/30/22 0801)   Vital Signs (24h Range):  Temp:  [94.4 °F (34.7 °C)-98.7 °F (37.1 °C)] 97.5 °F (36.4 °C)  Pulse:  [] 103  Resp:  [14-42] 20  SpO2:  [73 %-100 %] 100 %  BP: ()/(36-72) 125/52  Arterial Line BP: (146)/(63) 146/63     Weight: 114.4 kg (252 lb 3.3 oz)  Body mass index is 34.21 kg/m².      Intake/Output Summary (Last 24 hours) at 8/30/2022 0844  Last data filed at 8/30/2022 0700  Gross per 24 hour   Intake 1471.27 ml   Output 1225 ml   Net 246.27 ml       Physical Exam  Vitals and nursing note reviewed.   Constitutional:        HENT:      Head: Normocephalic.      Nose: No signs of injury.      Right Nostril: No epistaxis.      Left Nostril: No epistaxis.        Mouth/Throat:      Mouth: Mucous membranes are dry.      Comments: Dried blood on oral mucosa; airway patent  Cardiovascular:      Rate and Rhythm: Regular rhythm. Tachycardia present. No  extrasystoles are present.     Pulses:           Radial pulses are 2+ on the right side and 2+ on the left side.   Pulmonary:      Effort: Pulmonary effort is normal. No tachypnea or respiratory distress.      Breath sounds: Normal breath sounds. No decreased breath sounds.   Abdominal:      General: Abdomen is protuberant. Bowel sounds are normal. There is no distension.      Tenderness: There is no abdominal tenderness.   Musculoskeletal:      Right lower leg: No edema.      Left lower leg: No edema.   Skin:     General: Skin is cool and dry.      Capillary Refill: Capillary refill takes 2 to 3 seconds.      Coloration: Skin is pale.   Neurological:      Mental Status: He is lethargic.      GCS: GCS eye subscore is 3. GCS verbal subscore is 5. GCS motor subscore is 5.       Vents:       Lines/Drains/Airways       Central Venous Catheter Line  Duration                  Hemodialysis Catheter -- days              Drain  Duration                  NG/OG Tube 08/29/22 1 day         Sheath 08/30/22 0547 Right anterior;proximal <1 day              Peripheral Intravenous Line  Duration                  Peripheral IV - Single Lumen 08/30/22 0052 20 G Anterior;Left;Proximal Forearm <1 day         Peripheral IV - Single Lumen 08/30/22 0714 20 G Anterior;Right Upper Arm <1 day                    Significant Labs:    CBC/Anemia Profile:  Recent Labs   Lab 08/29/22 2020 08/30/22  0305   WBC 6.54 12.49   HGB 8.4* 6.6*   HCT 24.5* 19.7*   * 143*   MCV 90 92   RDW 16.0* 16.8*        Chemistries:  Recent Labs   Lab 08/29/22 2020 08/30/22  0305    137   K 4.1 4.1    106   CO2 22* 19*   * 118*   CREATININE 6.4* 6.5*   CALCIUM 7.5* 7.0*   ALBUMIN 2.2* 1.6*   PROT 4.4*  --    BILITOT 0.5  --    ALKPHOS 43*  --    ALT 11  --    AST 11  --    MG 1.4*  --    PHOS 2.5* 2.3*       Coagulation:   Recent Labs   Lab 08/29/22 2020   INR 1.0       POCT Glucose:   Recent Labs   Lab 08/29/22  2344 08/30/22  0709    POCTGLUCOSE 135* 264*       All pertinent labs within the past 24 hours have been reviewed.    Significant Imaging:   I have reviewed all pertinent imaging results/findings within the past 24 hours.

## 2022-08-30 NOTE — ASSESSMENT & PLAN NOTE
S/p embolization by IR.  Receiving two additional units of blood this morning.   Monitor H/H and stool output.  Continue Protonix drip.

## 2022-08-30 NOTE — ASSESSMENT & PLAN NOTE
ESRD on PD, since Jan 2022  Cause of ESRD no clear at present. Had kidney biopsy in [place] ([facility] records reviewed). 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

## 2022-08-30 NOTE — ASSESSMENT & PLAN NOTE
Recent upper endoscopy on 223 Aug showed a bleeding ulcer with visible vessel in the gastric fundus.  Hemostasis achieved with three hemo-clips.  Not actively bleeding and last Hgb level was 7.0 before transfer.  Leave NG tube in place and clamped.  Starting Pantoprazole infusion.  Follow serial blood counts, type and screen, and transfuse as needed.  He may need Interventional Radiology for embolization if he bleeds again.  Remain NPO.  Discussed with Dr. Palma.    8/30:  Status post embolization of left gastric artery by IR   Repeat H&H  Transfuse hemoglobin less than 8  Continue NPO

## 2022-08-30 NOTE — H&P
Short Stay Endoscopy History and Physical    PCP - Bruno Oconnor NP    Procedure - EGD  ASA - 4  Mallampati - per anesthesia  History of Anesthesia problems - no  Family history Anesthesia problems -  no     HPI:  This is a 67 y.o. male here for evaluation of :   Active Hospital Problems    Diagnosis  POA    *Hematemesis [K92.0]  Yes    Bleeding gastric ulcer [K25.4]  Yes    Shock circulatory [R57.9]  Yes    JACK (obstructive sleep apnea) [G47.33]  Yes    Peritonitis associated with peritoneal dialysis [T85.71XA]  Yes    ESRD on peritoneal dialysis [N18.6, Z99.2]  Not Applicable    Status post liver transplant [Z94.4]  Not Applicable    HTN (hypertension) [I10]  Yes    DM2 (diabetes mellitus, type 2) [E11.9]  Yes    Coronary artery disease involving native coronary artery of native heart without angina pectoris [I25.10]  Yes      Resolved Hospital Problems   No resolved problems to display.       ROS:  CONSTITUTIONAL: Denies weight change,  fatigue, fevers, chills, night sweats.  CARDIOVASCULAR: Denies chest pain, shortness of breath, orthopnea and edema.  RESPIRATORY: Denies cough, hemoptysis, dyspnea, and wheezing.  GI: See HPI.    Medical History:   Past Medical History:   Diagnosis Date    Allergy     Anticoagulant long-term use     Arthritis     Back pain     Cellulitis     Congenital heart disease     Hearing loss     right ear    Hepatic encephalopathy     HTN (hypertension)     Hypertension     diagnosed today (11/25/2015) and prescribed toprol    Leg edema     Nephrolithiasis     JACK (obstructive sleep apnea)     JACK (obstructive sleep apnea)     Seizures     per Pt last seizure 2018- controlled wit medication    Splenomegaly        Surgical History:   Past Surgical History:   Procedure Laterality Date    APPENDECTOMY      Open    BACK SURGERY      CHOLECYSTECTOMY      laprascopic    COLONOSCOPY N/A 1/25/2018    Procedure: COLONOSCOPY;  Surgeon: Lashawn Myles MD;  Location: [facility] ([address] [place] [address]);   Service: Endoscopy;  Laterality: N/A;    COLONOSCOPY N/A 12/20/2021    Procedure: COLONOSCOPY;  Surgeon: Jesus Grayson MD;  Location: [facility] (2ND FLR);  Service: Endoscopy;  Laterality: N/A;  start with a pediatric colonoscope and might need the slim pediatric colonoscope available.   priority case per Dr. Grayson-labwork am of procedure  RAPID COVID test coming for > 3 hrs away/ prep ins. [email or web address] / Pt requesting specific date for travel purposes    ESOPHAGOGASTRODUODENOSCOPY N/A 8/23/2022    Procedure: EGD (ESOPHAGOGASTRODUODENOSCOPY);  Surgeon: Nora Houser MD;  Location: [facility];  Service: Endoscopy;  Laterality: N/A;    LIVER TRANSPLANT N/A 7/23/2018    Procedure: TRANSPLANT, LIVER;  Surgeon: Alma Joyce MD;  [facility];  Service: Transplant;  Laterality: N/A;    LUMBAR DISCECTOMY      SPLENIC ARTERY EMBOLIZATION  04/08/2016    Dr Taveras    THORACENTESIS Left 8/3/2020    Procedure: Thoracentesis  U/S notified;  Surgeon: Augustine Rodriguez MD;  Location: [facility];  Service: Pulmonary;  Laterality: Left;    THORACENTESIS Left 4/14/2021    Procedure: Thoracentesis;  Surgeon: Augustine Rodriguez MD;  Location: [facility];  Service: Pulmonary;  Laterality: Left;    TONSILLECTOMY         Family History:  Family History   Problem Relation Age of Onset    Melanoma Mother     Heart attack Neg Hx     Heart disease Neg Hx     Hypertension Neg Hx        Social History:   Social History     Tobacco Use    Smoking status: Never    Smokeless tobacco: Former     Types: Chew     Quit date: 7/29/2018   Substance Use Topics    Alcohol use: No     Alcohol/week: 0.0 standard drinks    Drug use: No       Allergy  Review of patient's allergies indicates:   Allergen Reactions    Nsaids (non-steroidal anti-inflammatory drug)      D/t to liver disease.  Other reaction(s): Unknown    Penicillins Other (See Comments)     Tolerated pip-tazo in 8/2016 without issue  Tolerated cefepime 7/2018  without issue  Since childhood was told not to take it  Other reaction(s): Unknown       Medications:   No current facility-administered medications on file prior to encounter.     Current Outpatient Medications on File Prior to Encounter   Medication Sig Dispense Refill    aspirin (ECOTRIN) 81 MG EC tablet Take 81 mg by mouth once daily.       atorvastatin (LIPITOR) 40 MG tablet Take 40 mg by mouth nightly.      B complex-vitamin C-folic acid (STAR-LAURY/NEPHRO-LAURY) 0.8 mg Tab Take 1 tablet by mouth once daily.      cyclobenzaprine (FLEXERIL) 5 MG tablet Take 5 mg by mouth 3 (three) times daily as needed.      ergocalciferol (VITAMIN D2) 50,000 unit Cap Take 50,000 Units by mouth every 7 days. (Saturdays)      ferrous sulfate 324 mg (65 mg iron) TbEC Take 1 tablet (324 mg total) by mouth once daily. 60 tablet 0    furosemide (LASIX) 40 MG tablet Take 80 mg by mouth 2 (two) times a day.      gentamicin (GARAMYCIN) 0.1 % cream Apply topically 2 (two) times daily.      levothyroxine (SYNTHROID) 50 MCG tablet Take 50 mcg by mouth once daily.      ondansetron (ZOFRAN) 4 MG tablet TAKE 1 TABLET BY MOUTH EVERY 8 HOURS FOR 4 DAYS      pantoprazole (PROTONIX) 40 MG tablet Take 1 tablet (40 mg total) by mouth 2 (two) times daily. 60 tablet 3    promethazine (PHENERGAN) 25 MG tablet Take 25 mg by mouth 3 (three) times daily as needed.      sevelamer carbonate (RENVELA) 800 mg Tab Take 1,600 mg by mouth 3 (three) times daily with meals.      sucralfate (CARAFATE) 1 gram tablet Take 1 tablet (1 g total) by mouth 4 (four) times daily before meals and nightly. for 14 days 56 tablet 0    tacrolimus (PROGRAF) 1 MG Cap Take 2 capsules (2 mg total) by mouth every morning AND 1 capsule (1 mg total) every evening. 90 capsule 11       Physical Exam:  Vital Signs:   Vitals:    08/30/22 1800   BP:    Pulse: 104   Resp: (!) 24   Temp: 98 °F (36.7 °C)     General Appearance: Well appearing in no acute distress  ENT: OP clear  Chest: CTA  B  CV: RRR, no m/r/g  Abd: s/nt/nd/nabs  Ext: no edema    Labs:  Reviewed    IMP:  Active Hospital Problems    Diagnosis  POA    *Hematemesis [K92.0]  Yes    Bleeding gastric ulcer [K25.4]  Yes    Shock circulatory [R57.9]  Yes    JACK (obstructive sleep apnea) [G47.33]  Yes    Peritonitis associated with peritoneal dialysis [T85.71XA]  Yes    ESRD on peritoneal dialysis [N18.6, Z99.2]  Not Applicable    Status post liver transplant [Z94.4]  Not Applicable    HTN (hypertension) [I10]  Yes    DM2 (diabetes mellitus, type 2) [E11.9]  Yes    Coronary artery disease involving native coronary artery of native heart without angina pectoris [I25.10]  Yes      Resolved Hospital Problems   No resolved problems to display.         Plan:  I have explained the risks and benefits of endoscopy procedures to the patient including but not limited to bleeding, perforation, infection, and death. The patient wishes to proceed.

## 2022-08-30 NOTE — ASSESSMENT & PLAN NOTE
- Nephrology following  - ESRD on PD since 01/2022, appears at baseline  - Scheduled for today  - Monitor electrolytes

## 2022-08-30 NOTE — CONSULTS
[facility] ([facility])  Critical Care Medicine  Consult Note    Patient Name: Alon Adamson  MRN: [id number]  Admission Date: 8/29/2022  Hospital Length of Stay: 1 days  Code Status: Full Code  Attending Physician: Jef Senior MD   Primary Care Provider: Bruno Oconnor NP   Principal Problem: Hematemesis    [unfilled]  Subjective:     HPI:  67 year old male with a PMHx of EtOH cirrhosis s/p liver transplant 2018 (on tacrolimus), ESRD on peritoneal dialysis, JACK, CAD, HTN, and DM who presented to [facility] in [place], [place] the 8/29/22 morning with hematemesis x 3 days. Hypotensive on arrival and patient started on IVF, pantoprazole infusion, metronidazole, ciprofloxacin & received 4 U pRBC.  Hb has fallen since admission (9.1 > 7.0). Recently admitted to [facility] 8/23/22 for same complication; oozing gastric ulcer found on EGD, trated with bipolar cautery and hemostatic clip placement.     Accepted to [facility] for GI services. Upon arrival to ICU pt started on Protonix infusion but had recurrent large volume hematemesis episodes overnight; 2 additional units PRBCs, 1 unit PLT and FFP administered and pt brought to IR this AM for embolization of the left gastric artery.       Hospital/ICU Course:  8/30: Pt seen and examined once returned to ICU from cath lab. Currently sedated, on a Protonix drip. NGT currently clamped. VSS. Rt femoral sheath with no complications. Awaiting rpt labs.       Past Medical History:   Diagnosis Date    Allergy     Anticoagulant long-term use     Arthritis     Back pain     Cellulitis     Congenital heart disease     Hearing loss     right ear    Hepatic encephalopathy     HTN (hypertension)     Hypertension     diagnosed today (11/25/2015) and prescribed toprol    Leg edema     Nephrolithiasis     JACK (obstructive sleep apnea)     JACK (obstructive sleep apnea)     Seizures     per Pt last seizure 2018- controlled wit medication    Splenomegaly         Past Surgical History:   Procedure Laterality Date    APPENDECTOMY      Open    BACK SURGERY      CHOLECYSTECTOMY      laprascopic    COLONOSCOPY N/A 1/25/2018    Procedure: COLONOSCOPY;  Surgeon: Lashawn Myles MD;  Location: [facility] (2ND FLR);  Service: Endoscopy;  Laterality: N/A;    COLONOSCOPY N/A 12/20/2021    Procedure: COLONOSCOPY;  Surgeon: Jesus Grayson MD;  [facility]  Service: Endoscopy;  Laterality: N/A;  start with a pediatric colonoscope and might need the slim pediatric colonoscope available.   priority case per Dr. Grayson-labwork am of procedure  RAPID COVID test coming for > 3 hrs away/ prep ins. [email or web address] / Pt requesting specific date for travel purposes    ESOPHAGOGASTRODUODENOSCOPY N/A 8/23/2022    Procedure: EGD (ESOPHAGOGASTRODUODENOSCOPY);  Surgeon: Nora Houser MD;  Location: [facility];  Service: Endoscopy;  Laterality: N/A;    LIVER TRANSPLANT N/A 7/23/2018    Procedure: TRANSPLANT, LIVER;  Surgeon: Alma Joyce MD;  [facility];  Service: Transplant;  Laterality: N/A;    LUMBAR DISCECTOMY      SPLENIC ARTERY EMBOLIZATION  04/08/2016    Dr Taveras    THORACENTESIS Left 8/3/2020    Procedure: Thoracentesis  U/S notified;  Surgeon: Augustine Rodriguez MD;  Location: [facility];  Service: Pulmonary;  Laterality: Left;    THORACENTESIS Left 4/14/2021    Procedure: Thoracentesis;  Surgeon: Augustine Rodriguez MD;  Location: [facility];  Service: Pulmonary;  Laterality: Left;    TONSILLECTOMY         Review of patient's allergies indicates:   Allergen Reactions    Nsaids (non-steroidal anti-inflammatory drug)      D/t to liver disease.  Other reaction(s): Unknown    Penicillins Other (See Comments)     Tolerated pip-tazo in 8/2016 without issue  Tolerated cefepime 7/2018 without issue  Since childhood was told not to take it  Other reaction(s): Unknown       Family History       Problem Relation (Age of Onset)    Melanoma Mother           Tobacco Use    Smoking status: Never    Smokeless tobacco: Former     Types: Chew     Quit date: 7/29/2018   Substance and Sexual Activity    Alcohol use: No     Alcohol/week: 0.0 standard drinks    Drug use: No    Sexual activity: Not Currently         Review of Systems   Unable to perform ROS: Other (s/p procedural sedation)   Objective:     Vital Signs (Most Recent):  Temp: 97.5 °F (36.4 °C) (08/30/22 0700)  Pulse: 103 (08/30/22 0801)  Resp: 20 (08/30/22 0801)  BP: (!) 125/52 (08/30/22 0630)  SpO2: 100 % (08/30/22 0801)   Vital Signs (24h Range):  Temp:  [94.4 °F (34.7 °C)-98.7 °F (37.1 °C)] 97.5 °F (36.4 °C)  Pulse:  [] 103  Resp:  [14-42] 20  SpO2:  [73 %-100 %] 100 %  BP: ()/(36-72) 125/52  Arterial Line BP: (146)/(63) 146/63     Weight: 114.4 kg (252 lb 3.3 oz)  Body mass index is 34.21 kg/m².      Intake/Output Summary (Last 24 hours) at 8/30/2022 0844  Last data filed at 8/30/2022 0700  Gross per 24 hour   Intake 1471.27 ml   Output 1225 ml   Net 246.27 ml       Physical Exam  Vitals and nursing note reviewed.   Constitutional:        HENT:      Head: Normocephalic.      Nose: No signs of injury.      Right Nostril: No epistaxis.      Left Nostril: No epistaxis.        Mouth/Throat:      Mouth: Mucous membranes are dry.      Comments: Dried blood on oral mucosa; airway patent  Cardiovascular:      Rate and Rhythm: Regular rhythm. Tachycardia present. No extrasystoles are present.     Pulses:           Radial pulses are 2+ on the right side and 2+ on the left side.   Pulmonary:      Effort: Pulmonary effort is normal. No tachypnea or respiratory distress.      Breath sounds: Normal breath sounds. No decreased breath sounds.   Abdominal:      General: Abdomen is protuberant. Bowel sounds are normal. There is no distension.      Tenderness: There is no abdominal tenderness.   Musculoskeletal:      Right lower leg: No edema.      Left lower leg: No edema.   Skin:     General: Skin is cool  and dry.      Capillary Refill: Capillary refill takes 2 to 3 seconds.      Coloration: Skin is pale.   Neurological:      Mental Status: He is lethargic.      GCS: GCS eye subscore is 3. GCS verbal subscore is 5. GCS motor subscore is 5.       Vents:       Lines/Drains/Airways       Central Venous Catheter Line  Duration                  Hemodialysis Catheter -- days              Drain  Duration                  NG/OG Tube 08/29/22 1 day         Sheath 08/30/22 0547 Right anterior;proximal <1 day              Peripheral Intravenous Line  Duration                  Peripheral IV - Single Lumen 08/30/22 0052 20 G Anterior;Left;Proximal Forearm <1 day         Peripheral IV - Single Lumen 08/30/22 0714 20 G Anterior;Right Upper Arm <1 day                    Significant Labs:    CBC/Anemia Profile:  Recent Labs   Lab 08/29/22 2020 08/30/22  0305   WBC 6.54 12.49   HGB 8.4* 6.6*   HCT 24.5* 19.7*   * 143*   MCV 90 92   RDW 16.0* 16.8*        Chemistries:  Recent Labs   Lab 08/29/22 2020 08/30/22  0305    137   K 4.1 4.1    106   CO2 22* 19*   * 118*   CREATININE 6.4* 6.5*   CALCIUM 7.5* 7.0*   ALBUMIN 2.2* 1.6*   PROT 4.4*  --    BILITOT 0.5  --    ALKPHOS 43*  --    ALT 11  --    AST 11  --    MG 1.4*  --    PHOS 2.5* 2.3*       Coagulation:   Recent Labs   Lab 08/29/22 2020   INR 1.0       POCT Glucose:   Recent Labs   Lab 08/29/22  2344 08/30/22  0709   POCTGLUCOSE 135* 264*       All pertinent labs within the past 24 hours have been reviewed.    Significant Imaging:   I have reviewed all pertinent imaging results/findings within the past 24 hours.        ABG  No results for input(s): PH, PO2, PCO2, HCO3, BE in the last 168 hours.  Assessment/Plan:     Cardiac/Vascular  Coronary artery disease involving native coronary artery of native heart without angina pectoris  - Hold ASA in setting of active bleed  - Cont EKG monitoring    Renal/  ESRD on peritoneal dialysis  - Nephrology  following  - ESRD on PD since 01/2022, appears at baseline  - Scheduled for today  - Monitor electrolytes    ID  Peritonitis associated with peritoneal dialysis  - Intra-peritoneal Vancomycin initiated 08/26 August, fluid culture (+) S.hominis  - No noted tenderness on assessment  - Monitor fever curve  - CBC daily    Endocrine  DM2 (diabetes mellitus, type 2)  - NPO; diabetic diet when appropriate  - Mod SSI  - POCT glu q6h    GI  * Hematemesis  - GI following  - Gelfoam embolization performed of left gastric artery without complication  - IV PPI  - Total 6u PRBc received today, order for 7th and rpt H/H at noon  - Monitor CBC  - Continue NPO  - Transfuse as appropriate for goal Hgb > 7, active bleeding, goal Plt > 50 & goal INR < = 1.6    Status post liver transplant  - ETOH abuse related  - Per GI mgmt of meds, currently on tacrolimus    Other  JACK (obstructive sleep apnea)  - No hx of CPAP utilization  - Cont O2 monitoring  - Consider CPAP PRN HS if SpO2 < 90%       Critical Care Daily Checklist:    A: Awake: RASS Goal/Actual Goal: RASS Goal: 0-->alert and calm  Actual: [place] Agitation Sedation Scale (RASS): Alert and calm   B: Spontaneous Breathing Trial Performed?     C: SAT & SBT Coordinated?  n/a                      D: Delirium: CAM-ICU Overall CAM-ICU: Negative   E: Early Mobility Performed? No   F: Feeding Goal:    Status:     Current Diet Order   Procedures    Diet NPO Except for: Medication, Ice Chips, Sips with Medication     Order Specific Question:   Except for     Answer:   Medication     Order Specific Question:   Except for     Answer:   Ice Chips     Order Specific Question:   Except for     Answer:   Sips with Medication      AS: Analgesia/Sedation Reviewed   T: Thromboembolic Prophylaxis SCDs- active bleed    H: HOB > 300 Yes   U: Stress Ulcer Prophylaxis (if needed) PPI   G: Glucose Control  Mod SSI    B: Bowel Function Stool Occurrence: 1   I: Indwelling Catheter (Lines & [place]) Necessity  Art line Day: 1   D: De-escalation of Antimicrobials/Pharmacotherapies Reviewed    Plan for the day/ETD Stabilize CBC    Code Status:  Family/Goals of Care: Full Code  Discuss with wife at bedside    I have discussed case and plan of care in detail with Dr. Kellogg and Dr. Senior; Status and plan of care were discussed with team on multidisciplinary rounds.    Critical Care Time: 68 minutes  Critical secondary to Patient has a condition that poses threat to life and bodily function: GI bleed, CKD on PD        Critical care was time spent personally by me on the following activities: development of treatment plan with patient or surrogate and bedside caregivers, discussions with consultants, evaluation of patient's response to treatment, examination of patient, ordering and performing treatments and interventions, ordering and review of laboratory studies, ordering and review of radiographic studies, pulse oximetry, re-evaluation of patient's condition. This critical care time did not overlap with that of any other provider or involve time for any procedures.    Thank you for your consult. I will follow-up with patient. Please contact us if you have any additional questions.     Latisha Mays NP  Critical Care Medicine  [facility] ([facility])

## 2022-08-30 NOTE — CONSULTS
[facility] (H[facility])  Nephrology  Consult Note    Patient Name: Alon Adamson  MRN: [id number]  Admission Date: 8/29/2022  Hospital Length of Stay: 1 days  Attending Provider: Jef Senior MD   Primary Care Physician: Bruno Oconnor NP  Principal Problem:Hematemesis    Reason for consult: ESRD    Consults  Subjective:     HPI: Pt was seen and examined in ICU this am. Labs and meds reviewed. Discussed with other providers. Pt is a 68 y/o male with h/o of ESRD on PD who presented with upper GI bleed. Pt has a h/o of liver transplant in 2018 and developed kidney failure since then. Per wife, a kidney biopsy was done in [place], but she does not know what the biopsy showed. Pt was placed on PD in Jan 2022. Possibility of calcineurin inhibitor toxicity due to taking prograf for the liver transplant was discussed. She does not recall consistently elevated prograf levels.Pt also has a h/o of HTN and IGT. Pt was admitted for upper GI blood loss, he started throwing up bright red blood since 2 days prior to admit. Pt is s/p embolization of blood vessel that was actively bleeding the stomach since admit. No acute or new c/o's this am. Had last PD exchange 1 day before admit. Pt was re-evaluated during the day, no new issues reported.      Past Medical History:   Diagnosis Date    Allergy     Anticoagulant long-term use     Arthritis     Back pain     Cellulitis     Congenital heart disease     Hearing loss     right ear    Hepatic encephalopathy     HTN (hypertension)     Hypertension     diagnosed today (11/25/2015) and prescribed toprol    Leg edema     Nephrolithiasis     JACK (obstructive sleep apnea)     JACK (obstructive sleep apnea)     Seizures     per Pt last seizure 2018- controlled wit medication    Splenomegaly        Past Surgical History:   Procedure Laterality Date    APPENDECTOMY      Open    BACK SURGERY      CHOLECYSTECTOMY      laprascopic    COLONOSCOPY N/A 1/25/2018     Procedure: COLONOSCOPY;  Surgeon: Lashawn Myles MD;  Location: [facility] (2ND FLR);  Service: Endoscopy;  Laterality: N/A;    COLONOSCOPY N/A 12/20/2021    Procedure: COLONOSCOPY;  Surgeon: Jesus Grayson MD;  Location: [facility] (2ND FLR);  Service: Endoscopy;  Laterality: N/A;  start with a pediatric colonoscope and might need the slim pediatric colonoscope available.   priority case per Dr. Grayson-labwork am of procedure  RAPID COVID test coming for > 3 hrs away/ prep ins. [email or web address] / Pt requesting specific date for travel purposes    ESOPHAGOGASTRODUODENOSCOPY N/A 8/23/2022    Procedure: EGD (ESOPHAGOGASTRODUODENOSCOPY);  Surgeon: Nora Houser MD;  Location: [facility];  Service: Endoscopy;  Laterality: N/A;    LIVER TRANSPLANT N/A 7/23/2018    Procedure: TRANSPLANT, LIVER;  Surgeon: Alma Joyce MD;  Location: [facility] OR 2ND FLR;  Service: Transplant;  Laterality: N/A;    LUMBAR DISCECTOMY      SPLENIC ARTERY EMBOLIZATION  04/08/2016    Dr Taveras    THORACENTESIS Left 8/3/2020    Procedure: Thoracentesis  U/S notified;  Surgeon: Augustine Rodriguez MD;  Location: [facility];  Service: Pulmonary;  Laterality: Left;    THORACENTESIS Left 4/14/2021    Procedure: Thoracentesis;  Surgeon: Augustine Rodriguez MD;  Location: [facility];  Service: Pulmonary;  Laterality: Left;    TONSILLECTOMY         Review of patient's allergies indicates:   Allergen Reactions    Nsaids (non-steroidal anti-inflammatory drug)      D/t to liver disease.  Other reaction(s): Unknown    Penicillins Other (See Comments)     Tolerated pip-tazo in 8/2016 without issue  Tolerated cefepime 7/2018 without issue  Since childhood was told not to take it  Other reaction(s): Unknown     Current Facility-Administered Medications   Medication Frequency    0.9%  NaCl infusion (for blood administration) Q24H PRN    0.9%  NaCl infusion (for blood administration) Q24H PRN    0.9%  NaCl infusion (for blood  administration) Q24H PRN    0.9%  NaCl infusion (for blood administration) Q24H PRN    acetaminophen tablet 650 mg Q4H PRN    aluminum-magnesium hydroxide-simethicone 200-200-20 mg/5 mL suspension 30 mL QID PRN    dextrose 10% bolus 125 mL PRN    dextrose 10% bolus 250 mL PRN    glucagon (human recombinant) injection 1 mg PRN    glucose chewable tablet 16 g PRN    glucose chewable tablet 24 g PRN    insulin aspart U-100 pen 0-5 Units Q6H PRN    levothyroxine tablet 50 mcg Daily    magnesium sulfate 2g in water 50mL IVPB (premix) Q2H    melatonin tablet 6 mg Nightly PRN    mupirocin 2 % ointment BID    naloxone 0.4 mg/mL injection 0.02 mg PRN    ondansetron disintegrating tablet 8 mg Q8H PRN    ondansetron injection 4 mg Q6H PRN    pantoprazole 40 mg in  mL infusion (ready to mix system) Continuous    polyethylene glycol packet 17 g Daily    promethazine (PHENERGAN) 12.5 mg in dextrose 5 % 50 mL IVPB Q6H PRN    simethicone chewable tablet 80 mg QID PRN    sodium chloride 0.9% flush 10 mL Q8H PRN    tacrolimus capsule 1 mg Daily PM    tacrolimus capsule 2 mg Daily AM     Family History       Problem Relation (Age of Onset)    Melanoma Mother          Tobacco Use    Smoking status: Never    Smokeless tobacco: Former     Types: Chew     Quit date: 7/29/2018   Substance and Sexual Activity    Alcohol use: No     Alcohol/week: 0.0 standard drinks    Drug use: No    Sexual activity: Not Currently     Review of Systems   Constitutional: Negative.    HENT: Negative.     Respiratory: Negative.     Cardiovascular: Negative.    Gastrointestinal:  Positive for vomiting. Negative for abdominal distention and abdominal pain.        +hematemesis   Genitourinary: Negative.    Musculoskeletal: Negative.    Skin: Negative.    Neurological: Negative.    Psychiatric/Behavioral: Negative.     Objective:     Vital Signs (Most Recent):  Temp: 98.1 °F (36.7 °C) (08/30/22 1000)  Pulse: 110 (08/30/22  1000)  Resp: (!) 22 (08/30/22 1000)  BP: (!) 125/52 (08/30/22 0630)  SpO2: 100 % (08/30/22 1000)  O2 Device (Oxygen Therapy): room air (08/30/22 1000)   Vital Signs (24h Range):  Temp:  [94.4 °F (34.7 °C)-98.7 °F (37.1 °C)] 98.1 °F (36.7 °C)  Pulse:  [] 110  Resp:  [14-42] 22  SpO2:  [73 %-100 %] 100 %  BP: ()/(36-72) 125/52  Arterial Line BP: (118-146)/(57-66) 125/63     Weight: 114.4 kg (252 lb 3.3 oz) (08/30/22 0600)  Body mass index is 34.21 kg/m².  Body surface area is 2.41 meters squared.    I/O last 3 completed shifts:  In: 1471.3 [I.V.:238.8; Blood:1232.5]  Out: 1225 [Urine:125; Emesis/NG output:1100]    Physical Exam  Vitals and nursing note reviewed.   Constitutional:       Appearance: Normal appearance.   HENT:      Head: Normocephalic and atraumatic.   Cardiovascular:      Rate and Rhythm: Normal rate and regular rhythm.      Pulses: Normal pulses.      Heart sounds: Normal heart sounds.   Pulmonary:      Effort: Pulmonary effort is normal.      Breath sounds: Normal breath sounds.   Abdominal:      General: Abdomen is flat. There is no distension.      Palpations: Abdomen is soft.      Tenderness: There is no abdominal tenderness. There is no guarding.   Musculoskeletal:      Right lower leg: No edema.      Left lower leg: No edema.   Skin:     General: Skin is warm and dry.   Neurological:      General: No focal deficit present.      Mental Status: He is alert and oriented to person, place, and time.   Psychiatric:         Mood and Affect: Mood normal.         Behavior: Behavior normal.       Significant Labs: reviewed  BMP  Lab Results   Component Value Date     08/30/2022    K 4.1 08/30/2022     08/30/2022    CO2 19 (L) 08/30/2022     (H) 08/30/2022    CREATININE 6.5 (H) 08/30/2022    CALCIUM 7.0 (L) 08/30/2022    ANIONGAP 12 08/30/2022    ESTGFRAFRICA 16.1 (A) 03/03/2021    EGFRNONAA 14.0 (A) 03/03/2021     Lab Results   Component Value Date    WBC 12.49 08/30/2022     HGB 6.5 (L) 08/30/2022    HCT 19.7 (LL) 08/30/2022    MCV 92 08/30/2022     (L) 08/30/2022         Significant Imaging: reviewed    Assessment/Plan:     66 y/o male with ESRD on PD and liver transplant presented with upper GI bleed:    ESRD on peritoneal dialysis  ESRD on PD, since Jan 2022  Cause of ESRD no clear at present. Had kidney biopsy in [place] ([facility] records reviewed). 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 Acute  S/p embolization  Acute blood loss  S/p PRBC transfusions x multiple units  Reviewed GI notes    Status post liver transplant  Reviewed the records, chart    DM2 (diabetes mellitus, type 2)  Reviewed the chart    Coronary artery disease involving native coronary artery of native heart without angina pectoris  Reviewed the chart      Plans and recommendations:  As discussed above  Total critical care time spent 70 minutes including time needed to review the records, the   patient evaluation, documentation, face-to-face discussion with the patient,   more than 50% of the time was spent on coordination of care and counseling.    Level V visit.  Multiple medical issues were addressed, as documented. Medical care provided was in addition to providing dialysis. Pt received multiple visits and evaluations.    Kevan Mo MD   Nephrology  [facility] [facility] ([facility])

## 2022-08-30 NOTE — SUBJECTIVE & OBJECTIVE
Past Medical History:   Diagnosis Date    Allergy     Anticoagulant long-term use     Arthritis     Back pain     Cellulitis     Congenital heart disease     Hearing loss     right ear    Hepatic encephalopathy     HTN (hypertension)     Hypertension     diagnosed today (11/25/2015) and prescribed toprol    Leg edema     Nephrolithiasis     JACK (obstructive sleep apnea)     JACK (obstructive sleep apnea)     Seizures     per Pt last seizure 2018- controlled wit medication    Splenomegaly        Past Surgical History:   Procedure Laterality Date    APPENDECTOMY      Open    BACK SURGERY      CHOLECYSTECTOMY      laprascopic    COLONOSCOPY N/A 1/25/2018    Procedure: COLONOSCOPY;  Surgeon: Lashawn Myles MD;  Location: [facility] (2ND FLR);  Service: Endoscopy;  Laterality: N/A;    COLONOSCOPY N/A 12/20/2021    Procedure: COLONOSCOPY;  Surgeon: Jesus Grayson MD;  [facility]  Service: Endoscopy;  Laterality: N/A;  start with a pediatric colonoscope and might need the slim pediatric colonoscope available.   priority case per Dr. Grayson-labwork am of procedure  RAPID COVID test coming for > 3 hrs away/ prep ins. [email or web address] / Pt requesting specific date for travel purposes    ESOPHAGOGASTRODUODENOSCOPY N/A 8/23/2022    Procedure: EGD (ESOPHAGOGASTRODUODENOSCOPY);  Surgeon: Nora Houser MD;  Location: [facility];  Service: Endoscopy;  Laterality: N/A;    LIVER TRANSPLANT N/A 7/23/2018    Procedure: TRANSPLANT, LIVER;  Surgeon: Alma Joyce MD;  Location: [facility] OR [address] [place] [address];  Service: Transplant;  Laterality: N/A;    LUMBAR DISCECTOMY      SPLENIC ARTERY EMBOLIZATION  04/08/2016    Dr Taveras    THORACENTESIS Left 8/3/2020    Procedure: Thoracentesis  U/S notified;  Surgeon: Augustine Rodriguez MD;  Location: [facility];  Service: Pulmonary;  Laterality: Left;    THORACENTESIS Left 4/14/2021    Procedure: Thoracentesis;  Surgeon: Augustine Rodriguez MD;  Location: [facility];  Service:  Pulmonary;  Laterality: Left;    TONSILLECTOMY         Review of patient's allergies indicates:   Allergen Reactions    Nsaids (non-steroidal anti-inflammatory drug)      D/t to liver disease.  Other reaction(s): Unknown    Penicillins Other (See Comments)     Tolerated pip-tazo in 8/2016 without issue  Tolerated cefepime 7/2018 without issue  Since childhood was told not to take it  Other reaction(s): Unknown     Family History       Problem Relation (Age of Onset)    Melanoma Mother          Tobacco Use    Smoking status: Never    Smokeless tobacco: Former     Types: Chew     Quit date: 7/29/2018   Substance and Sexual Activity    Alcohol use: No     Alcohol/week: 0.0 standard drinks    Drug use: No    Sexual activity: Not Currently     Review of Systems   Unable to perform ROS: Acuity of condition   Objective:     Vital Signs (Most Recent):  Temp: 97.5 °F (36.4 °C) (08/30/22 0700)  Pulse: (!) 111 (08/30/22 0900)  Resp: 20 (08/30/22 0900)  BP: (!) 125/52 (08/30/22 0630)  SpO2: 100 % (08/30/22 0900)   Vital Signs (24h Range):  Temp:  [94.4 °F (34.7 °C)-98.7 °F (37.1 °C)] 97.5 °F (36.4 °C)  Pulse:  [] 111  Resp:  [14-42] 20  SpO2:  [73 %-100 %] 100 %  BP: ()/(36-72) 125/52  Arterial Line BP: (121-146)/(57-66) 135/66     Weight: 114.4 kg (252 lb 3.3 oz) (08/30/22 0600)  Body mass index is 34.21 kg/m².      Intake/Output Summary (Last 24 hours) at 8/30/2022 0949  Last data filed at 8/30/2022 0900  Gross per 24 hour   Intake 1561.21 ml   Output 1225 ml   Net 336.21 ml       Lines/Drains/Airways       Central Venous Catheter Line  Duration                  Hemodialysis Catheter -- days              Drain  Duration                  NG/OG Tube 08/29/22 1 day         Sheath 08/30/22 0547 Right anterior;proximal <1 day              Peripheral Intravenous Line  Duration                  Peripheral IV - Single Lumen 08/30/22 0052 20 G Anterior;Left;Proximal Forearm <1 day         Peripheral IV - Single Lumen  08/30/22 0714 20 G Anterior;Right Upper Arm <1 day                    Physical Exam  Constitutional:       General: He is sleeping.      Appearance: He is well-developed. He is ill-appearing.   HENT:      Head: Normocephalic and atraumatic.   Eyes:      Extraocular Movements: Extraocular movements intact.   Cardiovascular:      Rate and Rhythm: Regular rhythm. Tachycardia present.      Heart sounds: Normal heart sounds. No murmur heard.  Pulmonary:      Effort: Pulmonary effort is normal. No respiratory distress.      Breath sounds: Normal breath sounds. No wheezing.   Abdominal:      General: Bowel sounds are normal. There is distension.      Palpations: Abdomen is soft. There is no hepatomegaly or mass.      Tenderness: There is no abdominal tenderness.   Musculoskeletal:      Cervical back: Normal range of motion and neck supple.      Right lower leg: Edema present.      Left lower leg: Edema present.   Skin:     General: Skin is warm and dry.      Findings: No rash.   Neurological:      Mental Status: He is easily aroused.       Significant Labs:  CBC:   Recent Labs   Lab 08/29/22 2020 08/30/22  0305 08/30/22  0820   WBC 6.54 12.49  --    HGB 8.4* 6.6* 6.5*   HCT 24.5* 19.7* 19.7*   * 143*  --      CMP:   Recent Labs   Lab 08/29/22 2020 08/30/22  0305   * 192*   CALCIUM 7.5* 7.0*   ALBUMIN 2.2* 1.6*   PROT 4.4*  --     137   K 4.1 4.1   CO2 22* 19*    106   * 118*   CREATININE 6.4* 6.5*   ALKPHOS 43*  --    ALT 11  --    AST 11  --    BILITOT 0.5  --      Coagulation:   Recent Labs   Lab 08/29/22 2020   INR 1.0       Significant Imaging:  Imaging results within the past 24 hours have been reviewed.

## 2022-08-30 NOTE — ASSESSMENT & PLAN NOTE
Consultation to nephrology for ongoing renal replacement.  Plan to resume PD on 30 Aug.  No signs of volume overload and chemistries are within acceptable limits.  Follow serial renal function chemistries.  He does make urine.  Discussed with Dr. Mo.

## 2022-08-30 NOTE — ASSESSMENT & PLAN NOTE
Patient's FSGs are uncontrolled due to hyperglycemia on current medication regimen.  Last A1c reviewed-   Lab Results   Component Value Date    HGBA1C 5.6 08/23/2022     Most recent fingerstick glucose reviewed-   Recent Labs   Lab 08/29/22  2344 08/30/22  0709   POCTGLUCOSE 135* 264*     Current correctional scale  Low  Maintain anti-hyperglycemic dose as follows-   Antihyperglycemics (From admission, onward)    Start     Stop Route Frequency Ordered    08/29/22 2124  insulin aspart U-100 pen 0-5 Units         -- SubQ Every 6 hours PRN 08/29/22 2024        Hold Oral hypoglycemics while patient is in the hospital.

## 2022-08-30 NOTE — ASSESSMENT & PLAN NOTE
Recent upper endoscopy on 223 Aug showed a bleeding ulcer with visible vessel in the gastric fundus.  Hemostasis achieved with three hemo-clips.  Not actively bleeding and last Hgb level was 7.0 before transfer.  Leave NG tube in place and clamped.  Starting Pantoprazole infusion.  Follow serial blood counts, type and screen, and transfuse as needed.  He may need Interventional Radiology for embolization if he bleeds again.  Remain NPO.  Discussed with Dr. Palma.

## 2022-08-30 NOTE — HOSPITAL COURSE
8/30: Pt seen and examined once returned to ICU from cath lab. Currently sedated, on a Protonix drip. NGT currently clamped. VSS. Rt femoral sheath with no complications. Awaiting rpt labs.   8/31: Bedside EGD performed yesterday after rpt episodes of bright red hematemesis, no active bleeding found, washout performed. Hypotensive upon assessment, responsive to fluid bolus. No occurrences of bleeding overnight, H/H stable. Will extubate.     9/1 - Tolerating extubation.  PD dialysis overnight for 6 hours with 1.7 L removed.  Completed 12 th unit PRBC this AM.  No upper bleeding, only melena overnight.  Mild asymptomatic hypotension not requiring pressor support.  Still with mod hematuria.  Upright in bed fully AA&OX3 in no distress with RA SAT 99%.   9/2 - Upright in bed fully awake and alert in no distress with VSS.  Had 2 green BMs overnight and no signs of bleeding with H/H holding.

## 2022-08-30 NOTE — ASSESSMENT & PLAN NOTE
Gastric arterial bleed. Acute  S/p embolization  Acute blood loss  S/p PRBC transfusions x multiple units  Reviewed GI notes

## 2022-08-30 NOTE — ASSESSMENT & PLAN NOTE
Check Tacrolimus level and continue 2 mg in the morning an 1 mg in the evening.  He is managed outpatient by Dr. García.  Checking PT/INR and LFTs.

## 2022-08-30 NOTE — H&P
[facility] - Intensive Care ([facility] Medicine  History & Physical    Patient Name: Alon Adamson  MRN: [id number]  Patient Class: IP- Inpatient  Admission Date: 8/29/2022  Attending Physician: Denys Pierre MD   Primary Care Provider: Bruno Oconnor NP         Patient information was obtained from patient, past medical records and ER records.     Subjective:     Principal Problem:Hematemesis    Chief Complaint: No chief complaint on file.       HPI: Mr. Adamson started throwing up blood at home Saturday morning.  It started with low abdominal pain then nausea.  The vomit was dark and grainy mixed with bright red blood.  Threw up multiple times with large amounts of bloody liquid.  He the same problem a few days ago and was admitted and endoscopy showed blood clots in his stomach.  He also got very weak and could not stand up.  His home blood pressure monitor showed as low as 47/30.  He did not lose consciousness or fall.  No chest pain or shortness of breath.  Dizzy on standing.  He received three units of blood at [facility] before transfer here.      Past Medical History:   Diagnosis Date    Allergy     Anticoagulant long-term use     Arthritis     Back pain     Cellulitis     Congenital heart disease     Hearing loss     right ear    Hepatic encephalopathy     HTN (hypertension)     Hypertension     diagnosed today (11/25/2015) and prescribed toprol    Leg edema     Nephrolithiasis     JACK (obstructive sleep apnea)     JACK (obstructive sleep apnea)     Seizures     per Pt last seizure 2018- controlled wit medication    Splenomegaly        Past Surgical History:   Procedure Laterality Date    APPENDECTOMY      Open    BACK SURGERY      CHOLECYSTECTOMY      laprascopic    COLONOSCOPY N/A 1/25/2018    Procedure: COLONOSCOPY;  Surgeon: Lashawn Myles MD;  Location: [address];  Service: Endoscopy;  Laterality: N/A;    COLONOSCOPY N/A 12/20/2021     Procedure: COLONOSCOPY;  Surgeon: Jesus Grayson MD;  Location: [facility] (2ND [facility]);  Service: Endoscopy;  Laterality: N/A;  start with a pediatric colonoscope and might need the slim pediatric colonoscope available.   priority case per Dr. Grayson-labwork am of procedure  RAPID COVID test coming for > 3 hrs away/ prep ins. [email or web address] / Pt requesting specific date for travel purposes    ESOPHAGOGASTRODUODENOSCOPY N/A 8/23/2022    Procedure: EGD (ESOPHAGOGASTRODUODENOSCOPY);  Surgeon: Nora Houser MD;  Location: [facility];  Service: Endoscopy;  Laterality: N/A;    LIVER TRANSPLANT N/A 7/23/2018    Procedure: TRANSPLANT, LIVER;  Surgeon: Alma Joyce MD;  [facility];  Service: Transplant;  Laterality: N/A;    LUMBAR DISCECTOMY      SPLENIC ARTERY EMBOLIZATION  04/08/2016    Dr Taveras    THORACENTESIS Left 8/3/2020    Procedure: Thoracentesis  U/S notified;  Surgeon: Augustine Rodriguez MD;  Location: [facility];  Service: Pulmonary;  Laterality: Left;    THORACENTESIS Left 4/14/2021    Procedure: Thoracentesis;  Surgeon: Augustine Rodriguez MD;  Location: [facility];  Service: Pulmonary;  Laterality: Left;    TONSILLECTOMY         Review of patient's allergies indicates:   Allergen Reactions    Nsaids (non-steroidal anti-inflammatory drug)      D/t to liver disease.  Other reaction(s): Unknown    Penicillins Other (See Comments)     Tolerated pip-tazo in 8/2016 without issue  Tolerated cefepime 7/2018 without issue  Since childhood was told not to take it  Other reaction(s): Unknown       No current facility-administered medications on file prior to encounter.     Current Outpatient Medications on File Prior to Encounter   Medication Sig    aspirin (ECOTRIN) 81 MG EC tablet Take 81 mg by mouth once daily.     atorvastatin (LIPITOR) 40 MG tablet Take 40 mg by mouth nightly.    B complex-vitamin C-folic acid (STAR-LAURY/NEPHRO-LAURY) 0.8 mg Tab Take 1 tablet by mouth once  daily.    cyclobenzaprine (FLEXERIL) 5 MG tablet Take 5 mg by mouth 3 (three) times daily as needed.    ergocalciferol (VITAMIN D2) 50,000 unit Cap Take 50,000 Units by mouth every 7 days. (Saturdays)    ferrous sulfate 324 mg (65 mg iron) TbEC Take 1 tablet (324 mg total) by mouth once daily.    furosemide (LASIX) 40 MG tablet Take 80 mg by mouth 2 (two) times a day.    gentamicin (GARAMYCIN) 0.1 % cream Apply topically 2 (two) times daily.    levothyroxine (SYNTHROID) 50 MCG tablet Take 50 mcg by mouth once daily.    ondansetron (ZOFRAN) 4 MG tablet TAKE 1 TABLET BY MOUTH EVERY 8 HOURS FOR 4 DAYS    pantoprazole (PROTONIX) 40 MG tablet Take 1 tablet (40 mg total) by mouth 2 (two) times daily.    promethazine (PHENERGAN) 25 MG tablet Take 25 mg by mouth 3 (three) times daily as needed.    sevelamer carbonate (RENVELA) 800 mg Tab Take 1,600 mg by mouth 3 (three) times daily with meals.    sucralfate (CARAFATE) 1 gram tablet Take 1 tablet (1 g total) by mouth 4 (four) times daily before meals and nightly. for 14 days    tacrolimus (PROGRAF) 1 MG Cap Take 2 capsules (2 mg total) by mouth every morning AND 1 capsule (1 mg total) every evening.     Family History       Problem Relation (Age of Onset)    Melanoma Mother          Tobacco Use    Smoking status: Never    Smokeless tobacco: Former     Types: Chew     Quit date: 7/29/2018   Substance and Sexual Activity    Alcohol use: No     Alcohol/week: 0.0 standard drinks    Drug use: No    Sexual activity: Not Currently     Review of Systems   Constitutional: Negative.  Negative for chills, fatigue and fever.   HENT: Negative.  Negative for congestion, dental problem, rhinorrhea and sore throat.    Eyes: Negative.  Negative for visual disturbance.   Respiratory: Negative.  Negative for cough, shortness of breath and wheezing.    Cardiovascular: Negative.  Negative for chest pain, palpitations and leg swelling.   Gastrointestinal:  Positive for abdominal  pain, nausea and vomiting. Negative for diarrhea.   Endocrine: Negative.  Negative for cold intolerance and heat intolerance.   Genitourinary: Negative.  Negative for dysuria and hematuria.   Musculoskeletal: Negative.  Negative for arthralgias, gait problem and myalgias.   Skin: Negative.  Negative for rash and wound.   Allergic/Immunologic: Negative.    Neurological:  Positive for light-headedness. Negative for dizziness, syncope, weakness, numbness and headaches.   Hematological: Negative.  Negative for adenopathy.   Psychiatric/Behavioral: Negative.  Negative for confusion and dysphoric mood.    All other systems reviewed and are negative.  Objective:     Vital Signs (Most Recent):  Temp: 97.8 °F (36.6 °C) (08/29/22 1842)  Pulse: 97 (08/29/22 1842)  Resp: (!) 23 (08/29/22 1842)  BP: (!) 109/57 (08/29/22 1842)  SpO2: 100 % (08/29/22 1842)   Vital Signs (24h Range):  Temp:  [97.8 °F (36.6 °C)-98.7 °F (37.1 °C)] 97.8 °F (36.6 °C)  Pulse:  [] 97  Resp:  [16-23] 23  SpO2:  [97 %-100 %] 100 %  BP: (102-109)/(57-68) 109/57        There is no height or weight on file to calculate BMI.    Physical Exam  Constitutional:       General: He is not in acute distress.     Appearance: He is well-developed. He is not diaphoretic.   HENT:      Head: Normocephalic and atraumatic.   Eyes:      Conjunctiva/sclera: Conjunctivae normal.      Pupils: Pupils are equal, round, and reactive to light.   Neck:      Thyroid: No thyromegaly.      Vascular: No JVD.   Cardiovascular:      Rate and Rhythm: Regular rhythm. Tachycardia present.      Heart sounds: Normal heart sounds. No murmur heard.    No friction rub. No gallop.      Comments: Sinus tachycardia  Pulmonary:      Effort: Pulmonary effort is normal. No respiratory distress.      Breath sounds: Normal breath sounds. No wheezing or rales.   Abdominal:      General: Bowel sounds are normal. There is no distension.      Palpations: Abdomen is soft.      Tenderness: There is no  abdominal tenderness. There is no guarding or rebound.   Musculoskeletal:         General: No tenderness. Normal range of motion.      Right lower leg: Edema present.      Left lower leg: Edema present.      Comments: Chronic venous stasis changes to lower legs bilaterally.   Lymphadenopathy:      Cervical: No cervical adenopathy.   Skin:     General: Skin is warm and dry.      Findings: No erythema or rash.   Neurological:      Mental Status: He is alert and oriented to person, place, and time.      Cranial Nerves: No cranial nerve deficit.      Deep Tendon Reflexes: Reflexes are normal and symmetric. Reflexes normal.   Psychiatric:         Behavior: Behavior normal.         Thought Content: Thought content normal.         Judgment: Judgment normal.         CRANIAL NERVES     CN III, IV, VI   Pupils are equal, round, and reactive to light.     Significant Labs: All pertinent labs within the past 24 hours have been reviewed.    Significant Imaging: I have reviewed all pertinent imaging results/findings within the past 24 hours.    Assessment/Plan:     * Hematemesis  Recent upper endoscopy on 223 Aug showed a bleeding ulcer with visible vessel in the gastric fundus.  Hemostasis achieved with three hemo-clips.  Not actively bleeding and last Hgb level was 7.0 before transfer.  Leave NG tube in place and clamped.  Starting Pantoprazole infusion.  Follow serial blood counts, type and screen, and transfuse as needed.  He may need Interventional Radiology for embolization if he bleeds again.  Remain NPO.  Discussed with Dr. Palma.    Shock circulatory  Hemodynamically stable at admission with sinus tachycardia about 100 and .  Continue close monitoring and start Norepinephrine infusion and IV fluid resuscitation if needed.    Status post liver transplant  Check Tacrolimus level and continue 2 mg in the morning an 1 mg in the evening.  He is managed outpatient by Dr. García.  Checking PT/INR and LFTs.    ESRD on  peritoneal dialysis  Consultation to nephrology for ongoing renal replacement.  Plan to resume PD on 30 Aug.  No signs of volume overload and chemistries are within acceptable limits.  Follow serial renal function chemistries.  He does make urine.  Discussed with Dr. Mo.    Peritonitis associated with peritoneal dialysis  He was started on Intra-peritoneal Vancomycin on 26 August for a fluid culture growing S.hominis.  No signs or symptoms of infection.    HTN (hypertension)  Holding home medication for hypotension.  Resume when appropriate.    Coronary artery disease involving native coronary artery of native heart without angina pectoris  Holding Aspirin and Atorvastatin.  No chest pain or shortness of breath.  EKG as needed.    DM2 (diabetes mellitus, type 2)  Patient's FSGs are uncontrolled due to hyperglycemia on current medication regimen.  Last A1c reviewed-   Lab Results   Component Value Date    HGBA1C 5.6 08/23/2022     Most recent fingerstick glucose reviewed- No results for input(s): POCTGLUCOSE in the last 24 hours.  Current correctional scale  Low  Maintain anti-hyperglycemic dose as follows-   Antihyperglycemics (From admission, onward)    None        Hold Oral hypoglycemics while patient is in the hospital.    JACK (obstructive sleep apnea)  Diagnosed but he does not use CPAP.      VTE Risk Mitigation (From admission, onward)         Ordered     Reason for No Pharmacological VTE Prophylaxis  Once        Question:  Reasons:  Answer:  Active Bleeding    08/29/22 1906     IP VTE HIGH RISK PATIENT  Once         08/29/22 1906     Place sequential compression device  Until discontinued         08/29/22 1906              Critical care time spent on the evaluation and treatment of severe organ dysfunction, review of pertinent labs and imaging studies, discussions with consulting providers and discussions with patient/family: 60 minutes.     Gilberto Linn MD  Department of Hospital Medicine   [facility]  - Intensive Care ([facility])

## 2022-08-30 NOTE — ASSESSMENT & PLAN NOTE
He was started on Intra-peritoneal Vancomycin on 26 August for a fluid culture growing S.hominis.  No signs or symptoms of infection.

## 2022-08-30 NOTE — SUBJECTIVE & OBJECTIVE
Past Medical History:   Diagnosis Date    Allergy     Anticoagulant long-term use     Arthritis     Back pain     Cellulitis     Congenital heart disease     Hearing loss     right ear    Hepatic encephalopathy     HTN (hypertension)     Hypertension     diagnosed today (11/25/2015) and prescribed toprol    Leg edema     Nephrolithiasis     JACK (obstructive sleep apnea)     JACK (obstructive sleep apnea)     Seizures     per Pt last seizure 2018- controlled wit medication    Splenomegaly        Past Surgical History:   Procedure Laterality Date    APPENDECTOMY      Open    BACK SURGERY      CHOLECYSTECTOMY      laprascopic    COLONOSCOPY N/A 1/25/2018    Procedure: COLONOSCOPY;  Surgeon: Lashawn Myles MD;  Location: [facility] (2ND FLR);  Service: Endoscopy;  Laterality: N/A;    COLONOSCOPY N/A 12/20/2021    Procedure: COLONOSCOPY;  Surgeon: Jesus Grayson MD;  Location: [facility] ([facility]);  Service: Endoscopy;  Laterality: N/A;  start with a pediatric colonoscope and might need the slim pediatric colonoscope available.   priority case per Dr. Grayson-labwork am of procedure  RAPID COVID test coming for > 3 hrs away/ prep ins. [email or web address] / Pt requesting specific date for travel purposes    ESOPHAGOGASTRODUODENOSCOPY N/A 8/23/2022    Procedure: EGD (ESOPHAGOGASTRODUODENOSCOPY);  Surgeon: Nora Houser MD;  Location: [facility];  Service: Endoscopy;  Laterality: N/A;    LIVER TRANSPLANT N/A 7/23/2018    Procedure: TRANSPLANT, LIVER;  Surgeon: Alma Joyce MD;  Location: [facility] OR [address] [place] [address];  Service: Transplant;  Laterality: N/A;    LUMBAR DISCECTOMY      SPLENIC ARTERY EMBOLIZATION  04/08/2016    Dr Taveras    THORACENTESIS Left 8/3/2020    Procedure: Thoracentesis  U/S notified;  Surgeon: Augustine Rodriguez MD;  Location: [facility];  Service: Pulmonary;  Laterality: Left;    THORACENTESIS Left 4/14/2021    Procedure: Thoracentesis;  Surgeon: Augustine Rodriguez MD;  Location: [facility];  Service:  Pulmonary;  Laterality: Left;    TONSILLECTOMY         Review of patient's allergies indicates:   Allergen Reactions    Nsaids (non-steroidal anti-inflammatory drug)      D/t to liver disease.  Other reaction(s): Unknown    Penicillins Other (See Comments)     Tolerated pip-tazo in 8/2016 without issue  Tolerated cefepime 7/2018 without issue  Since childhood was told not to take it  Other reaction(s): Unknown     Current Facility-Administered Medications   Medication Frequency    0.9%  NaCl infusion (for blood administration) Q24H PRN    0.9%  NaCl infusion (for blood administration) Q24H PRN    0.9%  NaCl infusion (for blood administration) Q24H PRN    0.9%  NaCl infusion (for blood administration) Q24H PRN    acetaminophen tablet 650 mg Q4H PRN    aluminum-magnesium hydroxide-simethicone 200-200-20 mg/5 mL suspension 30 mL QID PRN    dextrose 10% bolus 125 mL PRN    dextrose 10% bolus 250 mL PRN    glucagon (human recombinant) injection 1 mg PRN    glucose chewable tablet 16 g PRN    glucose chewable tablet 24 g PRN    insulin aspart U-100 pen 0-5 Units Q6H PRN    levothyroxine tablet 50 mcg Daily    magnesium sulfate 2g in water 50mL IVPB (premix) Q2H    melatonin tablet 6 mg Nightly PRN    mupirocin 2 % ointment BID    naloxone 0.4 mg/mL injection 0.02 mg PRN    ondansetron disintegrating tablet 8 mg Q8H PRN    ondansetron injection 4 mg Q6H PRN    pantoprazole 40 mg in  mL infusion (ready to mix system) Continuous    polyethylene glycol packet 17 g Daily    promethazine (PHENERGAN) 12.5 mg in dextrose 5 % 50 mL IVPB Q6H PRN    simethicone chewable tablet 80 mg QID PRN    sodium chloride 0.9% flush 10 mL Q8H PRN    tacrolimus capsule 1 mg Daily PM    tacrolimus capsule 2 mg Daily AM     Family History       Problem Relation (Age of Onset)    Melanoma Mother          Tobacco Use    Smoking status: Never    Smokeless tobacco: Former     Types: Chew     Quit date: 7/29/2018   Substance and Sexual Activity     Alcohol use: No     Alcohol/week: 0.0 standard drinks    Drug use: No    Sexual activity: Not Currently     Review of Systems   Constitutional: Negative.    HENT: Negative.     Respiratory: Negative.     Cardiovascular: Negative.    Gastrointestinal:  Positive for vomiting. Negative for abdominal distention and abdominal pain.        +hematemesis   Genitourinary: Negative.    Musculoskeletal: Negative.    Skin: Negative.    Neurological: Negative.    Psychiatric/Behavioral: Negative.     Objective:     Vital Signs (Most Recent):  Temp: 98.1 °F (36.7 °C) (08/30/22 1000)  Pulse: 110 (08/30/22 1000)  Resp: (!) 22 (08/30/22 1000)  BP: (!) 125/52 (08/30/22 0630)  SpO2: 100 % (08/30/22 1000)  O2 Device (Oxygen Therapy): room air (08/30/22 1000)   Vital Signs (24h Range):  Temp:  [94.4 °F (34.7 °C)-98.7 °F (37.1 °C)] 98.1 °F (36.7 °C)  Pulse:  [] 110  Resp:  [14-42] 22  SpO2:  [73 %-100 %] 100 %  BP: ()/(36-72) 125/52  Arterial Line BP: (118-146)/(57-66) 125/63     Weight: 114.4 kg (252 lb 3.3 oz) (08/30/22 0600)  Body mass index is 34.21 kg/m².  Body surface area is 2.41 meters squared.    I/O last 3 completed shifts:  In: 1471.3 [I.V.:238.8; Blood:1232.5]  Out: 1225 [Urine:125; Emesis/NG output:1100]    Physical Exam  Vitals and nursing note reviewed.   Constitutional:       Appearance: Normal appearance.   HENT:      Head: Normocephalic and atraumatic.   Cardiovascular:      Rate and Rhythm: Normal rate and regular rhythm.      Pulses: Normal pulses.      Heart sounds: Normal heart sounds.   Pulmonary:      Effort: Pulmonary effort is normal.      Breath sounds: Normal breath sounds.   Abdominal:      General: Abdomen is flat. There is no distension.      Palpations: Abdomen is soft.      Tenderness: There is no abdominal tenderness. There is no guarding.   Musculoskeletal:      Right lower leg: No edema.      Left lower leg: No edema.   Skin:     General: Skin is warm and dry.   Neurological:      General:  No focal deficit present.      Mental Status: He is alert and oriented to person, place, and time.   Psychiatric:         Mood and Affect: Mood normal.         Behavior: Behavior normal.       Significant Labs: reviewed  BMP  Lab Results   Component Value Date     08/30/2022    K 4.1 08/30/2022     08/30/2022    CO2 19 (L) 08/30/2022     (H) 08/30/2022    CREATININE 6.5 (H) 08/30/2022    CALCIUM 7.0 (L) 08/30/2022    ANIONGAP 12 08/30/2022    ESTGFRAFRICA 16.1 (A) 03/03/2021    EGFRNONAA 14.0 (A) 03/03/2021     Lab Results   Component Value Date    WBC 12.49 08/30/2022    HGB 6.5 (L) 08/30/2022    HCT 19.7 (LL) 08/30/2022    MCV 92 08/30/2022     (L) 08/30/2022         Significant Imaging: reviewed

## 2022-08-30 NOTE — ASSESSMENT & PLAN NOTE
- Intra-peritoneal Vancomycin initiated 08/26 August, fluid culture (+) S.hominis  - No noted tenderness on assessment  - Monitor fever curve  - CBC daily

## 2022-08-30 NOTE — HPI
The patient presented to the ER for hematemesis. He was recently admitted here for the same, and underwent EGD. He was found to have a gastric ulcer with visible vessel treated with epi, bipolar cautery and hemostatic clip placement. He was discharged a couple days later in stable condition. Unfortunately, he started vomiting blood again three days ago. He was hypotensive and reported to [facility]. He was transferred here for a higher level of care. He received three units of blood prior to transfer. After arriving here, IR was able to bring him to the cath lab and embolize the left gastric artery. The patient is currently in ICU on a Protonix drip. Here so far he has received one unit of PRBC, PLT and FFP. Hgb today is 6.5 with two more units ordered. His nurse reports a dark blood stool this morning. NGT currently in place but clamped. History obtained from the medical record and medical staff.

## 2022-08-30 NOTE — SUBJECTIVE & OBJECTIVE
Interval History: s/p Gelfoam embolization performed of left gastric artery this am. Cont levophed and ppi. Wean levo as tolerated. Repeat h/h. Transfuse hgb < 8.     Review of Systems   Constitutional:  Negative for fatigue and fever.   HENT:  Negative for sinus pressure.    Eyes:  Negative for visual disturbance.   Respiratory:  Negative for shortness of breath.    Cardiovascular:  Negative for chest pain.   Gastrointestinal:  Positive for nausea and vomiting.   Genitourinary:  Negative for difficulty urinating.   Musculoskeletal:  Negative for back pain.   Skin:  Negative for rash.   Neurological:  Negative for headaches.   Psychiatric/Behavioral:  Negative for confusion.    Objective:     Vital Signs (Most Recent):  Temp: 97.5 °F (36.4 °C) (08/30/22 0700)  Pulse: 103 (08/30/22 0801)  Resp: 20 (08/30/22 0801)  BP: (!) 125/52 (08/30/22 0630)  SpO2: 100 % (08/30/22 0801)   Vital Signs (24h Range):  Temp:  [94.4 °F (34.7 °C)-98.7 °F (37.1 °C)] 97.5 °F (36.4 °C)  Pulse:  [] 103  Resp:  [14-42] 20  SpO2:  [73 %-100 %] 100 %  BP: ()/(36-72) 125/52  Arterial Line BP: (146)/(63) 146/63     Weight: 114.4 kg (252 lb 3.3 oz)  Body mass index is 34.21 kg/m².    Intake/Output Summary (Last 24 hours) at 8/30/2022 0822  Last data filed at 8/30/2022 0700  Gross per 24 hour   Intake 1471.27 ml   Output 1225 ml   Net 246.27 ml      Physical Exam  Constitutional:       General: He is not in acute distress.     Appearance: He is well-developed. He is not diaphoretic.   HENT:      Head: Normocephalic and atraumatic.   Eyes:      Pupils: Pupils are equal, round, and reactive to light.   Cardiovascular:      Rate and Rhythm: Normal rate and regular rhythm.      Heart sounds: Normal heart sounds. No murmur heard.    No friction rub. No gallop.   Pulmonary:      Effort: Pulmonary effort is normal. No respiratory distress.      Breath sounds: Normal breath sounds. No stridor. No wheezing or rales.   Abdominal:      General:  Bowel sounds are normal. There is no distension.      Palpations: Abdomen is soft. There is no mass.      Tenderness: There is no abdominal tenderness. There is no guarding.      Comments: NG tube in place   Skin:     General: Skin is warm.      Findings: No erythema.   Neurological:      Mental Status: He is alert and oriented to person, place, and time.       Significant Labs: All pertinent labs within the past 24 hours have been reviewed.    Significant Imaging: I have reviewed all pertinent imaging results/findings within the past 24 hours.

## 2022-08-30 NOTE — PROGRESS NOTES
Called by ICU team about arrival of transfer patient with upper GI bleed. Patient was recently discharged from [facility] for upper GI bleeding. EGD was performed at that time by Dr. Houser. On review of the EGD documentation, ulcer was 10 mm with visible vessel and oozing. Bleeding did not respond to epi and bipolar cautery. Eventually 3 clips were placed with temporary cessation of bleeding. Patient has now had rebleeding which means he has failed endoscopic intervention using multiple hemostatic methods (epi, bipolar cautery and hemostatic clip placement). Discussed with ICU team that the next step would be to consult IR for embolization of this area which should be easily locatable of clips are in place. Also recommend to keep patient on PPI gtt and to trend H&H, transfuse PRN to keep Hb > 7. Full consult note to follow in the am.     Breanna Palma MD  Gastroenterology and Hepatology

## 2022-08-30 NOTE — ASSESSMENT & PLAN NOTE
Consultation to nephrology for ongoing renal replacement.  Plan to resume PD on 30 Aug.  No signs of volume overload and chemistries are within acceptable limits.  Follow serial renal function chemistries.  He does make urine.  Discussed with Dr. Mo.    8/30:   Nephrology consulted on case

## 2022-08-30 NOTE — ASSESSMENT & PLAN NOTE
- GI following  - Gelfoam embolization performed of left gastric artery without complication  - IV PPI  - Total 6u PRBc received today, order for 7th and rpt H/H at noon  - Monitor CBC  - Continue NPO  - Transfuse as appropriate for goal Hgb > 7, active bleeding, goal Plt > 50 & goal INR < = 1.6    8/31:  - Multiple recurrent episodes of bright red hematemesis yesterday   - Rpt EGD performed yesterday, washout performed no evidence of active bleeding   - NPO until cleared by GI

## 2022-08-30 NOTE — HPI
Pt was seen and examined in ICU this am. Labs and meds reviewed. Discussed with other providers. Pt is a 68 y/o male with h/o of ESRD on PD who presented with upper GI bleed. Pt has a h/o of liver transplant in 2018 and developed kidney failure since then. Per wife, a kidney biopsy was done in [place], but she does not know what the biopsy showed. Pt was placed on PD in Jan 2022. Possibility of calcineurin inhibitor toxicity due to taking prograf for the liver transplant was discussed. She does not recall consistently elevated prograf levels.Pt also has a h/o of HTN and IGT. Pt was admitted for upper GI blood loss, he started throwing up bright red blood since 2 days prior to admit. Pt is s/p embolization of blood vessel that was actively bleeding the stomach since admit. No acute or new c/o's this am. Had last PD exchange 1 day before admit. Pt was re-evaluated during the day, no new issues reported.

## 2022-08-30 NOTE — INTERVAL H&P NOTE
The patient has been examined and the H&P has been reviewed:    I concur with the findings and no changes have occurred since H&P was written.    Procedure risks, benefits and alternative options discussed and understood by patient/family.          Active Hospital Problems    Diagnosis  POA    *Hematemesis [K92.0]  Yes    Shock circulatory [R57.9]  Yes    JACK (obstructive sleep apnea) [G47.33]  Yes    Peritonitis associated with peritoneal dialysis [T85.71XA]  Yes    ESRD on peritoneal dialysis [N18.6, Z99.2]  Not Applicable    Status post liver transplant [Z94.4]  Not Applicable    HTN (hypertension) [I10]  Yes    DM2 (diabetes mellitus, type 2) [E11.9]  Yes    Coronary artery disease involving native coronary artery of native heart without angina pectoris [I25.10]  Yes      Resolved Hospital Problems   No resolved problems to display.

## 2022-08-30 NOTE — HOSPITAL COURSE
8/30: s/p Gelfoam embolization performed of left gastric artery this am. Cont levophed and ppi. Wean levo as tolerated. Repeat h/h. Transfuse hgb < 8.   8/31: EGD performed yesterday. See GI report. H/H stable. No further active bleeding noted. Will extubate patient. Will need CTA GI bleed should pt bleed again.   9/1:  Plans in place for EGD by GI this afternoon.  H&H stable.  Continue to transfuse as indicated.  CT abdomen pelvis performed yesterday afternoon did not show signs of active bleeding.  9/2 S/P EGD yesterday :No old or fresh blood noted. No active bleeding. Exposed vessel noted in fundus of stomach with prior 3 clips in place. There was area of vessel that was exposed and not clipped off. Additional clip placed to this area. Vessel was then sprayed with water jet for a few seconds and no bleeding noted . The  H/H sligly decreased . He is s/p multiple blood transfusion . He is tolerating clear liquid diet . GI , Nephrology and general surgery on board .   9/3 he denies any new complaint for today . No obvious bloody  or black stool over the last 24 hrs .  The H/H has remain stable . NAEON . He did not received a blood transfusion yesterday .  9/4 NAEON . The H/H is stable  with no sign of acute bleeding . PT/OT rec SNF placement . CM consulted for placement .   9/5 Pt was seen and examined at bedside . He was determined to be suitable for d/c   PT/OT rec SNF . Pt and wife decided to go home with  . He is on PD and placement for SNF  has been difficult in the past . He had one episode of spontaneous bradycardia  which resolved immediately w/o any intervention . He has bene on sinus ryth w/o any problem .  Case was d/w family and pt in details . He will be d/c pn Protonix  40 mg po bid .  He was advised to f/u

## 2022-08-31 PROBLEM — Z99.2 ESRD ON PERITONEAL DIALYSIS: Chronic | Status: ACTIVE | Noted: 2022-01-01

## 2022-08-31 PROBLEM — N18.6 ESRD ON PERITONEAL DIALYSIS: Chronic | Status: ACTIVE | Noted: 2022-01-01

## 2022-08-31 PROBLEM — I25.10 CORONARY ARTERY DISEASE INVOLVING NATIVE CORONARY ARTERY OF NATIVE HEART WITHOUT ANGINA PECTORIS: Chronic | Status: ACTIVE | Noted: 2017-03-16

## 2022-08-31 PROBLEM — Z94.4 STATUS POST LIVER TRANSPLANT: Chronic | Status: ACTIVE | Noted: 2021-04-14

## 2022-08-31 PROBLEM — G47.33 OSA (OBSTRUCTIVE SLEEP APNEA): Chronic | Status: ACTIVE | Noted: 2022-01-01

## 2022-08-31 PROBLEM — E11.9 DM2 (DIABETES MELLITUS, TYPE 2): Chronic | Status: ACTIVE | Noted: 2021-03-03

## 2022-08-31 NOTE — SUBJECTIVE & OBJECTIVE
Interval History: Pt was seen and examined this am in ICU. Labs and meds reviewed. Discussed with other providers. EGD results reviewed. Pt has no new c/o's, wife at bedside. No abd pain, no SOB. Pt tolerating PD well.       Review of patient's allergies indicates:   Allergen Reactions    Nsaids (non-steroidal anti-inflammatory drug)      D/t to liver disease.  Other reaction(s): Unknown    Penicillins Other (See Comments)     Tolerated pip-tazo in 8/2016 without issue  Tolerated cefepime 7/2018 without issue  Since childhood was told not to take it  Other reaction(s): Unknown     Current Facility-Administered Medications   Medication Frequency    0.9%  NaCl infusion (for blood administration) Q24H PRN    0.9%  NaCl infusion Continuous    aluminum-magnesium hydroxide-simethicone 200-200-20 mg/5 mL suspension 30 mL QID PRN    dextrose 10% bolus 125 mL PRN    glucagon (human recombinant) injection 1 mg PRN    glucose chewable tablet 16 g PRN    glucose chewable tablet 24 g PRN    insulin aspart U-100 pen 1-10 Units Q6H PRN    levothyroxine tablet 50 mcg Daily    melatonin tablet 6 mg Nightly PRN    mupirocin 2 % ointment BID    naloxone 0.4 mg/mL injection 0.02 mg PRN    ondansetron disintegrating tablet 8 mg Q8H PRN    ondansetron injection 4 mg Q6H PRN    pantoprazole 40 mg in  mL infusion (ready to mix system) Continuous    polyethylene glycol packet 17 g Daily    promethazine (PHENERGAN) 12.5 mg in dextrose 5 % 50 mL IVPB Q6H PRN    simethicone chewable tablet 80 mg QID PRN    sodium chloride 0.9% flush 10 mL Q8H PRN    tacrolimus capsule 1 mg Daily PM    tacrolimus capsule 2 mg Daily AM       Objective:     Vital Signs (Most Recent):  Temp: 98 °F (36.7 °C) (08/31/22 0800)  Pulse: 98 (08/31/22 0912)  Resp: 16 (08/31/22 0912)  BP: (!) 85/53 (08/31/22 0900)  SpO2: 100 % (08/31/22 0912)  O2 Device (Oxygen Therapy): room air (08/31/22 0912)   Vital Signs (24h Range):  Temp:  [96.8 °F (36 °C)-98.3 °F (36.8 °C)]  98 °F (36.7 °C)  Pulse:  [] 98  Resp:  [14-42] 16  SpO2:  [88 %-100 %] 100 %  BP: ()/(44-79) 85/53  Arterial Line BP: ()/(40-93) 77/40     Weight: 111.3 kg (245 lb 6 oz) (08/31/22 0600)  Body mass index is 33.28 kg/m².  Body surface area is 2.38 meters squared.    I/O last 3 completed shifts:  In: 5367.9 [I.V.:1676.7; Blood:2232.5; IV Piggyback:1458.8]  Out: 2225 [Urine:425; Emesis/NG output:1800]    Physical Exam  Vitals and nursing note reviewed.   Constitutional:       Appearance: Normal appearance.   Cardiovascular:      Rate and Rhythm: Normal rate and regular rhythm.      Pulses: Normal pulses.      Heart sounds: Normal heart sounds.   Pulmonary:      Effort: Pulmonary effort is normal.      Breath sounds: Normal breath sounds.   Abdominal:      General: Abdomen is flat.      Tenderness: There is no abdominal tenderness.   Musculoskeletal:      Right lower leg: No edema.      Left lower leg: No edema.   Skin:     General: Skin is warm and dry.   Neurological:      Mental Status: He is alert and oriented to person, place, and time.   Psychiatric:         Behavior: Behavior normal.       Significant Labs: reviewed  BMP  Lab Results   Component Value Date     08/31/2022    K 3.8 08/31/2022     08/31/2022    CO2 19 (L) 08/31/2022     (H) 08/31/2022    CREATININE 5.7 (H) 08/31/2022    CALCIUM 7.1 (L) 08/31/2022    ANIONGAP 11 08/31/2022    ESTGFRAFRICA 16.1 (A) 03/03/2021    EGFRNONAA 14.0 (A) 03/03/2021     Lab Results   Component Value Date    WBC 11.39 08/31/2022    HGB 8.3 (L) 08/31/2022    HCT 23.9 (L) 08/31/2022    MCV 87 08/31/2022     (L) 08/31/2022         Significant Imaging: reviewed, including EGD studies

## 2022-08-31 NOTE — PROGRESS NOTES
[facility] - Intensive Care ([facility])  Critical Care Medicine  Progress Note    Patient Name: Alon Adamson  MRN: [id number]  Admission Date: 8/29/2022  Hospital Length of Stay: 2 days  Code Status: Full Code  Attending Provider: Jef Senior MD  Primary Care Provider: Bruno Oconnor NP   Principal Problem: Hematemesis    Subjective:     HPI:  67 year old male with a PMHx of EtOH cirrhosis s/p liver transplant 2018 (on tacrolimus), ESRD on peritoneal dialysis, JACK, CAD, HTN, and DM who presented to [facility] in [place], [place] the 8/29/22 morning with hematemesis x 3 days. Hypotensive on arrival and patient started on IVF, pantoprazole infusion, metronidazole, ciprofloxacin & received 4 U pRBC.  Hb has fallen since admission (9.1 > 7.0). Recently admitted to [facility] 8/23/22 for same complication; oozing gastric ulcer found on EGD, trated with bipolar cautery and hemostatic clip placement.     Accepted to [facility] for GI services. Upon arrival to ICU pt started on Protonix infusion but had recurrent large volume hematemesis episodes overnight; 2 additional units PRBCs, 1 unit PLT and FFP administered and pt brought to IR this AM for embolization of the left gastric artery.       Hospital/ICU Course:  8/30: Pt seen and examined once returned to ICU from cath lab. Currently sedated, on a Protonix drip. NGT currently clamped. VSS. Rt femoral sheath with no complications. Awaiting rpt labs.   8/31: Bedside EGD performed yesterday after rpt episodes of bright red hematemesis, no active bleeding found, washout performed. Hypotensive upon assessment, responsive to fluid bolus. No occurrences of bleeding overnight, H/H stable. Will extubate.         Review of Systems   Unable to perform ROS: Intubated      Objective:     Vital Signs (Most Recent):  Temp: 98 °F (36.7 °C) (08/31/22 0800)  Pulse: 102 (08/31/22 0800)  Resp: 15 (08/31/22 0800)  BP: (!) 95/50 (08/31/22 0800)  SpO2: 100 % (08/31/22 0800)    Vital Signs (24h Range):  Temp:  [96.8 °F (36 °C)-98.3 °F (36.8 °C)] 98 °F (36.7 °C)  Pulse:  [] 102  Resp:  [14-42] 15  SpO2:  [88 %-100 %] 100 %  BP: ()/(44-79) 95/50  Arterial Line BP: ()/(40-93) 77/40     Weight: 111.3 kg (245 lb 6 oz)  Body mass index is 33.28 kg/m².      Intake/Output Summary (Last 24 hours) at 8/31/2022 0829  Last data filed at 8/31/2022 0800  Gross per 24 hour   Intake 4495.6 ml   Output 1025 ml   Net 3470.6 ml       Physical Exam  Vitals and nursing note reviewed.   Constitutional:       General: He is awake. He is not in acute distress.     Appearance: He is ill-appearing.      Interventions: He is sedated, intubated and restrained.       HENT:      Head: Normocephalic.      Nose: No signs of injury.      Right Nostril: No epistaxis.      Left Nostril: No epistaxis.        Mouth/Throat:      Mouth: Mucous membranes are dry.      Comments: Dried blood on oral mucosa; airway patent  Cardiovascular:      Rate and Rhythm: Regular rhythm. Tachycardia present. No extrasystoles are present.     Pulses:           Radial pulses are 2+ on the right side and 2+ on the left side.   Pulmonary:      Effort: Pulmonary effort is normal. No tachypnea or respiratory distress. He is intubated.      Breath sounds: Normal breath sounds. No decreased breath sounds.   Abdominal:      General: Abdomen is protuberant. Bowel sounds are normal. There is no distension.      Tenderness: There is no abdominal tenderness.   Musculoskeletal:      Right lower leg: No edema.      Left lower leg: No edema.   Skin:     General: Skin is cool and dry.      Capillary Refill: Capillary refill takes 2 to 3 seconds.      Coloration: Skin is pale.   Neurological:      Mental Status: He is alert.      GCS: GCS eye subscore is 3. GCS verbal subscore is 5. GCS motor subscore is 5.   Psychiatric:         Behavior: Behavior is cooperative.       Vents:  Vent Mode: A/C (08/31/22 0516)  Ventilator Initiated: Yes  (08/30/22 2041)  Set Rate: 20 BPM (08/31/22 0516)  Vt Set: 400 mL (08/31/22 0516)  Pressure Support: 0 cmH20 (08/31/22 0516)  PEEP/CPAP: 5 cmH20 (08/31/22 0516)  Oxygen Concentration (%): 28 (08/31/22 0708)  Peak Airway Pressure: 18 cmH2O (08/31/22 0516)  Plateau Pressure: 13 cmH20 (08/31/22 0516)  Total Ve: 8.16 mL (08/31/22 0516)  F/VT Ratio<105 (RSBI): (!) 49.14 (08/31/22 0516)    Lines/Drains/Airways       Central Venous Catheter Line  Duration                  Hemodialysis Catheter -- days              Drain  Duration                  Sheath 08/30/22 0547 Right anterior;proximal 1 day         Urethral Catheter 08/30/22 2201 Double-lumen 16 Fr. <1 day              Peripheral Intravenous Line  Duration                  Peripheral IV - Single Lumen 08/30/22 0052 20 G Anterior;Left;Proximal Forearm 1 day         Peripheral IV - Single Lumen 08/30/22 0714 20 G Anterior;Right Upper Arm 1 day                    Significant Labs:    CBC/Anemia Profile:  Recent Labs   Lab 08/30/22  2250 08/31/22  0208 08/31/22  0602   WBC 9.23 9.63 11.39   HGB 8.7* 8.4* 8.3*   HCT 25.0* 24.8* 23.9*   * 140* 142*   MCV 86 88 87   RDW 15.2* 15.1* 15.8*        Chemistries:  Recent Labs   Lab 08/29/22 2020 08/30/22  0305 08/31/22  0646    137 136   K 4.1 4.1 3.8    106 106   CO2 22* 19* 19*   * 118* 116*   CREATININE 6.4* 6.5* 5.7*   CALCIUM 7.5* 7.0* 7.1*   ALBUMIN 2.2* 1.6*  --    PROT 4.4*  --   --    BILITOT 0.5  --   --    ALKPHOS 43*  --   --    ALT 11  --   --    AST 11  --   --    MG 1.4* 1.2* 2.5   PHOS 2.5* 2.3*  --        ABGs:   Recent Labs   Lab 08/31/22  0431   PH 7.371   PCO2 38.1   HCO3 22.1*   POCSATURATED 100   BE -3       Coagulation:   Recent Labs   Lab 08/29/22  2020   INR 1.0       POCT Glucose:   Recent Labs   Lab 08/30/22  1750 08/31/22  0043 08/31/22  0652   POCTGLUCOSE 204* 255* 214*     All pertinent labs within the past 24 hours have been reviewed.    Significant Imaging:  I have  reviewed all pertinent imaging results/findings within the past 24 hours.        ABG  Recent Labs   Lab 08/31/22  0431   PH 7.371   PO2 200*   PCO2 38.1   HCO3 22.1*   BE -3     Assessment/Plan:     Cardiac/Vascular  HTN (hypertension)  - Monitor    Coronary artery disease involving native coronary artery of native heart without angina pectoris  - Hold ASA in setting of active bleed  - Cont EKG monitoring    Renal/  ESRD on peritoneal dialysis  - Nephrology following  - ESRD on PD since 01/2022, appears at baseline  - Scheduled for today  - Monitor electrolytes    ID  Peritonitis associated with peritoneal dialysis  - Intra-peritoneal Vancomycin initiated 08/26 August, fluid culture (+) S.hominis  - No noted tenderness on assessment  - Monitor fever curve  - CBC daily    Endocrine  DM2 (diabetes mellitus, type 2)  - NPO; diabetic diet when appropriate  - Mod SSI  - POCT glu q6h    GI  * Hematemesis  - GI following  - Gelfoam embolization performed of left gastric artery without complication  - IV PPI  - Total 6u PRBc received today, order for 7th and rpt H/H at noon  - Monitor CBC  - Continue NPO  - Transfuse as appropriate for goal Hgb > 7, active bleeding, goal Plt > 50 & goal INR < = 1.6    8/31:  - Multiple recurrent episodes of bright red hematemesis yesterday   - Rpt EGD performed yesterday, washout performed no evidence of active bleeding   - NPO until cleared by GI    Status post liver transplant  - ETOH abuse related  - Per GI mgmt of meds, currently on tacrolimus    Other  JACK (obstructive sleep apnea)  - No hx of CPAP utilization  - Cont O2 monitoring  - Consider CPAP PRN HS if SpO2 < 90%        Critical Care Daily Checklist:    A: Awake: RASS Goal/Actual Goal: RASS Goal: 0-->alert and calm  Actual: Galan Agitation Sedation Scale (RASS): Alert and calm   B: Spontaneous Breathing Trial Performed?     C: SAT & SBT Coordinated?  Extubated this AM                      D: Delirium: CAM-ICU Overall  CAM-ICU: Negative   E: Early Mobility Performed? Yes   F: Feeding Goal:    Status:     Current Diet Order   Procedures    Diet NPO Except for: Medication, Ice Chips, Sips with Medication     Order Specific Question:   Except for     Answer:   Medication     Order Specific Question:   Except for     Answer:   Ice Chips     Order Specific Question:   Except for     Answer:   Sips with Medication      AS: Analgesia/Sedation Reviewed   T: Thromboembolic Prophylaxis SCDs   H: HOB > 300 Yes   U: Stress Ulcer Prophylaxis (if needed) PPI   G: Glucose Control Mod SSI   B: Bowel Function Stool Occurrence: 2   I: Indwelling Catheter (Lines & [place]) Necessity Femoral ART line day: 2   D: De-escalation of Antimicrobials/Pharmacotherapies Reviewed    Plan for the day/ETD See note    Code Status:  Family/Goals of Care: Full Code  Updated at bedside   I have discussed case and plan of care in detail with Dr. Kellogg and ADRIA Hoyt; Status and plan of care were discussed with team on multidisciplinary rounds.    Critical Care Time: 62 minutes  Critical secondary to Patient has a condition that poses threat to life and bodily function: GI bleed, CKD on PD      Critical care was time spent personally by me on the following activities: development of treatment plan with patient or surrogate and bedside caregivers, discussions with consultants, evaluation of patient's response to treatment, examination of patient, ordering and performing treatments and interventions, ordering and review of laboratory studies, ordering and review of radiographic studies, pulse oximetry, re-evaluation of patient's condition. This critical care time did not overlap with that of any other provider or involve time for any procedures.     Latisha Mays NP  Critical Care Medicine  [facility] - Intensive Care [facility])

## 2022-08-31 NOTE — TRANSFER OF CARE
Anesthesia Transfer of Care Note    Patient: Alon Adamson    Procedure(s) Performed: Procedure(s) (LRB):  EGD (ESOPHAGOGASTRODUODENOSCOPY) (N/A)    Patient location: ICU    Anesthesia Type: general    Post pain: adequate analgesia    Post assessment: no apparent anesthetic complications and tolerated procedure well    Post vital signs: stable    Level of consciousness: sedated    Nausea/Vomiting: no nausea/vomiting    Complications: none    Transfer of care protocol was followedComments: Pt remains intubated on ventilator.      Last vitals:   Visit Vitals  BP 91/79 (BP Location: Left arm, Patient Position: Lying)   Pulse 109   Temp 36.7 °C (98 °F) (Oral)   Resp 18   Ht 6' (1.829 m)   Wt 114.4 kg (252 lb 3.3 oz)   SpO2 100%   BMI 34.21 kg/m²

## 2022-08-31 NOTE — EICU
Remains intubated post EGD  Blood seen during procedure but unable to see source of bleed    Continued on Protonix drip  /67    ESRD on PD since January 2022  History of liver transplant 2018 on Tacrolimus    Orders placed:  [facility] vent  Fentanyl for sedation  Bilateral soft wrist restraints  CXR

## 2022-08-31 NOTE — PLAN OF CARE
Recommendations   1) Advance PO diet when medically able to goal of renal, DM 2000 kcal ( nothing acidic/spicy/peppery)   - if pt needs liquids only full liquid diet + boost glucose control with meal appropriate     2) If unable to advance PO diet to full liquids in < 4 days consider nutrition support PPN D 5 AA 4.25 @ 75 ml/hr + standard lipids   ( 1112 kcal, 76 g protein)     3) weigh weekly, antacid medications per GI discretion    Goals: 1) PO diet advanced in < 4 days or nutrition support started  Nutrition Goal Status: new  Communication of RD Recs:  (POC, sticky note)

## 2022-08-31 NOTE — PROGRESS NOTES
[facility] - Intensive Care ([facility] Medicine  Progress Note    Patient Name: Alon Adamson  MRN: [id number]  Patient Class: IP- Inpatient   Admission Date: 8/29/2022  Length of Stay: 2 days  Attending Physician: Jef Senior MD  Primary Care Provider: Bruno Oconnor NP        Subjective:     Principal Problem:Hematemesis        HPI:  Mr. Adamson started throwing up blood at home Saturday morning.  It started with low abdominal pain then nausea.  The vomit was dark and grainy mixed with bright red blood.  Threw up multiple times with large amounts of bloody liquid.  He the same problem a few days ago and was admitted and endoscopy showed blood clots in his stomach.  He also got very weak and could not stand up.  His home blood pressure monitor showed as low as 47/30.  He did not lose consciousness or fall.  No chest pain or shortness of breath.  Dizzy on standing.  He received three units of blood at [facility] before transfer here.      Overview/Hospital Course:  8/30: s/p Gelfoam embolization performed of left gastric artery this am. Cont levophed and ppi. Wean levo as tolerated. Repeat h/h. Transfuse hgb < 8.   8/31: EGD performed yesterday. See GI report. H/H stable. No further active bleeding noted. Will extubate patient. Will need CTA GI bleed should pt bleed again.       Interval History: EGD performed yesterday. See GI report. H/H stable. No further active bleeding noted. Will extubate patient. Will need CTA GI bleed should pt bleed again.     Review of Systems   Unable to perform ROS: Intubated   Objective:     Vital Signs (Most Recent):  Temp: 98 °F (36.7 °C) (08/31/22 0430)  Pulse: 98 (08/31/22 0630)  Resp: (!) 22 (08/31/22 0708)  BP: (!) 72/44 (08/31/22 0630)  SpO2: 100 % (08/31/22 0708)   Vital Signs (24h Range):  Temp:  [96.8 °F (36 °C)-98.3 °F (36.8 °C)] 98 °F (36.7 °C)  Pulse:  [] 98  Resp:  [14-42] 22  SpO2:  [88 %-100 %] 100 %  BP: ()/(44-79) 72/44  Arterial  Line BP: ()/(40-93) 77/40     Weight: 111.3 kg (245 lb 6 oz)  Body mass index is 33.28 kg/m².    Intake/Output Summary (Last 24 hours) at 8/31/2022 0820  Last data filed at 8/31/2022 0600  Gross per 24 hour   Intake 3472.46 ml   Output 1000 ml   Net 2472.46 ml      Physical Exam  Constitutional:       General: He is not in acute distress.     Appearance: He is well-developed. He is not diaphoretic.   HENT:      Head: Normocephalic and atraumatic.   Eyes:      Pupils: Pupils are equal, round, and reactive to light.   Cardiovascular:      Rate and Rhythm: Normal rate and regular rhythm.      Heart sounds: Normal heart sounds. No murmur heard.    No friction rub. No gallop.   Pulmonary:      Effort: Pulmonary effort is normal. No respiratory distress.      Breath sounds: Normal breath sounds. No stridor. No wheezing or rales.      Comments: intubated  Abdominal:      General: Bowel sounds are normal. There is no distension.      Palpations: Abdomen is soft. There is no mass.      Tenderness: There is no abdominal tenderness. There is no guarding.   Skin:     General: Skin is warm.      Findings: No erythema.   Neurological:      Mental Status: He is alert. Mental status is at baseline.       Significant Labs: All pertinent labs within the past 24 hours have been reviewed.    Significant Imaging: I have reviewed all pertinent imaging results/findings within the past 24 hours.        Assessment/Plan:      * Hematemesis  Recent upper endoscopy on 223 Aug showed a bleeding ulcer with visible vessel in the gastric fundus.  Hemostasis achieved with three hemo-clips.  Not actively bleeding and last Hgb level was 7.0 before transfer.  Leave NG tube in place and clamped.  Starting Pantoprazole infusion.  Follow serial blood counts, type and screen, and transfuse as needed.  He may need Interventional Radiology for embolization if he bleeds again.  Remain NPO.  Discussed with Dr. Palma.    8/31:  Post had hematemesis  late yesterday afternoon  EGD performed by gi showed old and new blood in stomach  However active source of bleeding was not discovered  H/h stable  No further signs of bleeding noted  May need CTA gi bleed scan should patient rebleed  Will plan to extubate today      Peritonitis associated with peritoneal dialysis  He was started on Intra-peritoneal Vancomycin on 26 August for a fluid culture growing S.hominis.  No signs or symptoms of infection.    JACK (obstructive sleep apnea)  Diagnosed but he does not use CPAP.    Shock circulatory  Continue Levophed   Wean as tolerated    ESRD on peritoneal dialysis  Consultation to nephrology for ongoing renal replacement.  Plan to resume PD on 30 Aug.  No signs of volume overload and chemistries are within acceptable limits.  Follow serial renal function chemistries.  He does make urine.  Discussed with Dr. Mo.    8/30:   Nephrology consulted on case    Status post liver transplant  Check Tacrolimus level and continue 2 mg in the morning an 1 mg in the evening.  He is managed outpatient by Dr. García.  Checking PT/INR and LFTs.     8/30:  Continue tacrolimus    DM2 (diabetes mellitus, type 2)  Patient's FSGs are uncontrolled due to hyperglycemia on current medication regimen.  Last A1c reviewed-   Lab Results   Component Value Date    HGBA1C 5.6 08/23/2022     Most recent fingerstick glucose reviewed-   Recent Labs   Lab 08/29/22  2344 08/30/22  0709   POCTGLUCOSE 135* 264*     Current correctional scale  Low  Maintain anti-hyperglycemic dose as follows-   Antihyperglycemics (From admission, onward)    Start     Stop Route Frequency Ordered    08/29/22 2124  insulin aspart U-100 pen 0-5 Units         -- SubQ Every 6 hours PRN 08/29/22 2024        Hold Oral hypoglycemics while patient is in the hospital.    HTN (hypertension)  Holding home medication for hypotension.  Resume when appropriate.    Coronary artery disease involving native coronary artery of native heart  without angina pectoris  Holding Aspirin and Atorvastatin.  No chest pain or shortness of breath.  EKG as needed.      VTE Risk Mitigation (From admission, onward)         Ordered     Reason for No Pharmacological VTE Prophylaxis  Once        Question:  Reasons:  Answer:  Active Bleeding    08/29/22 1906     IP VTE HIGH RISK PATIENT  Once         08/29/22 1906     Place sequential compression device  Until discontinued         08/29/22 1906                Discharge Planning   MARIO:      Code Status: Full Code   Is the patient medically ready for discharge?:     Reason for patient still in hospital (select all that apply): Patient trending condition  Discharge Plan A: Home with family            Critical care time spent on the evaluation and treatment of severe organ dysfunction, review of pertinent labs and imaging studies, discussions with consulting providers and discussions with patient/family: 36 minutes.      Jef Senior MD  Department of Hospital Medicine   [facility] - Intensive Care ([facility])

## 2022-08-31 NOTE — CONSULTS
[facility] - Intensive Care (Hospital)  Adult Nutrition  Consult Note    SUMMARY     Recommendations   1) Advance PO diet when medically able to goal of renal, DM 2000 kcal ( nothing acidic/spicy/peppery)   - if pt needs liquids only full liquid diet + boost glucose control with meal appropriate     2) If unable to advance PO diet to full liquids in < 4 days consider nutrition support PPN D 5 AA 4.25 @ 75 ml/hr + standard lipids   ( 1112 kcal, 76 g protein)     3) weigh weekly, antacid medications per GI discretion    Goals: 1) PO diet advanced in < 4 days or nutrition support started  Nutrition Goal Status: new  Communication of RD Recs:  (POC, sticky note)    Assessment and Plan    Inadequate energy/protein intakes  R/t NPO, altered GI function  As evidenced by PO intakes < 50% of needs x 2-3 days  Intervention: collaboration with other providers  new     Malnutrition Assessment     Skin (Micronutrient):  (Markus = 15, buttocks redness and skin tear)  Teeth (Micronutrient):  (missing some)   Micronutrient Evaluation: suspected deficiency  Micronutrient Evaluation Comments: iron               Edema (Fluid Accumulation):  (1-2+)             Reason for Assessment    Reason For Assessment: consult  Diagnosis:  (hematemesis)  Relevant Medical History: kidney transplant 2018, DM2, ESRD on PD, HTN  Interdisciplinary Rounds: did not attend (remote)    General Information Comments: 66 y/o male admitted s/p hematemesis PTA, bleeding gastric ulcer. NPO x 2 days, had EGD yesterday and just extubated today. NFPE to be done by on-site RD at f/u. Insignificant wt loss per chart review. Buttocks redness/skin tear noted.    Nutrition Discharge Planning: To be determined- Renal, DM 2000 kcal diet ( nothing spicy/acidic/peppery)    Nutrition Risk Screen    Nutrition Risk Screen: no indicators present    Nutrition/Diet History    Spiritual, Cultural Beliefs, Orthodoxy Practices, Values that Affect Care: no  Food Allergies:  NKFA  Factors Affecting Nutritional Intake: nausea/vomiting, abdominal pain, NPO, on mechanical ventilation    Anthropometrics    Temp: 98 °F (36.7 °C)  Height Method: Measured (office 21)  Height: 6'  Height (inches): 72 in  Weight Method: Bed Scale  Weight: 111.3 kg (245 lb 6 oz)  Weight (lb): 245.37 lb  Ideal Body Weight (IBW), Male: 178 lb  % Ideal Body Weight, Male (lb): 137.85 %  BMI (Calculated): 33.3  BMI Grade: 30 - 34.9- obesity - grade I  Usual Body Weight (UBW), k.6 kg (21)  % Usual Body Weight: 97.32  % Weight Change From Usual Weight: -2.88 %       Lab/Procedures/Meds    Pertinent Labs Reviewed: reviewed  BMP  Lab Results   Component Value Date     2022    K 3.8 2022     2022    CO2 19 (L) 2022     (H) 2022    CREATININE 5.7 (H) 2022    CALCIUM 7.1 (L) 2022    ANIONGAP 11 2022    ESTGFRAFRICA 16.1 (A) 2021    EGFRNONAA 14.0 (A) 2021     Recent Labs   Lab 22  0652   POCTGLUCOSE 214*     Lab Results   Component Value Date    HGBA1C 5.6 2022     Lab Results   Component Value Date    CALCIUM 7.1 (L) 2022    PHOS 2.3 (L) 2022       Pertinent Medications Reviewed: reviewed  Pertinent Medications Comments: polyethylene glycol, NS @ 150 ml/hr, insulin, zofran, phenergan    Estimated/Assessed Needs    Weight Used For Calorie Calculations: 77 kg (169 lb 12.1 oz) (NHANES standard BW)  Energy Calorie Requirements (kcal): 25-30 kcal/kg ( obesity vs. PD) = 0092-5273 kcal  Energy Need Method: Kcal/kg  Protein Requirements: 1.2-1.3 g protein/kg (  g)  Weight Used For Protein Calculations: 77 kg (169 lb 12.1 oz)  Fluid Requirements (mL): per MD ( to maintain fluid balance)  Estimated Fluid Requirement Method: other (see comments)  CHO Requirement: 216-264 g      Nutrition Prescription Ordered    Current Diet Order: NPO x 2 days    Evaluation of Received Nutrient/Fluid Intake    Energy Calories  Required: not meeting needs  Protein Required: not meeting needs  Fluid Required: exceeds needs  Total Fluid Intake (mL/kg): IVF @ 150 ml/hr  Tolerance: other (see comments) (NPO)  % Intake of Estimated Energy Needs: 0%  % Meal Intake: NPO    Nutrition Risk    Level of Risk/Frequency of Follow-up: moderate 2 x weekly      Monitor and Evaluation    Food and Nutrient Intake: energy intake  Food and Nutrient Adminstration: diet order, enteral and parenteral nutrition administration  Anthropometric Measurements: weight  Biochemical Data, Medical Tests and Procedures: electrolyte and renal panel, gastrointestinal profile, glucose/endocrine profile  Nutrition-Focused Physical Findings: overall appearance       Nutrition Follow-Up  yes

## 2022-08-31 NOTE — PLAN OF CARE
Patient extubated this AM. EGD completed yesterday with extensive clot removal from the gastric body. Unable to clear all clotted material. Patient's hemoglobin stable at 8.3. If he begins with active bleeding, recommend STAT CTA with triple phase. GI will sign off. Please re-consult as needed.    Zaira Choudhary PA-C

## 2022-08-31 NOTE — ASSESSMENT & PLAN NOTE
Recent upper endoscopy on 223 Aug showed a bleeding ulcer with visible vessel in the gastric fundus.  Hemostasis achieved with three hemo-clips.  Not actively bleeding and last Hgb level was 7.0 before transfer.  Leave NG tube in place and clamped.  Starting Pantoprazole infusion.  Follow serial blood counts, type and screen, and transfuse as needed.  He may need Interventional Radiology for embolization if he bleeds again.  Remain NPO.  Discussed with Dr. Palma.    8/31:  Post had hematemesis late yesterday afternoon  EGD performed by gi showed old and new blood in stomach  However active source of bleeding was not discovered  H/h stable  No further signs of bleeding noted  May need CTA gi bleed scan should patient rebleed  Will plan to extubate today

## 2022-08-31 NOTE — PLAN OF CARE
PD cycler started per p/p and Dr. Mo orders. See pd orders. See pd flow sheet. Tolerating. Supplies at bs.

## 2022-08-31 NOTE — SUBJECTIVE & OBJECTIVE
Review of Systems   Unable to perform ROS: Intubated      Objective:     Vital Signs (Most Recent):  Temp: 98 °F (36.7 °C) (08/31/22 0800)  Pulse: 102 (08/31/22 0800)  Resp: 15 (08/31/22 0800)  BP: (!) 95/50 (08/31/22 0800)  SpO2: 100 % (08/31/22 0800)   Vital Signs (24h Range):  Temp:  [96.8 °F (36 °C)-98.3 °F (36.8 °C)] 98 °F (36.7 °C)  Pulse:  [] 102  Resp:  [14-42] 15  SpO2:  [88 %-100 %] 100 %  BP: ()/(44-79) 95/50  Arterial Line BP: ()/(40-93) 77/40     Weight: 111.3 kg (245 lb 6 oz)  Body mass index is 33.28 kg/m².      Intake/Output Summary (Last 24 hours) at 8/31/2022 0829  Last data filed at 8/31/2022 0800  Gross per 24 hour   Intake 4495.6 ml   Output 1025 ml   Net 3470.6 ml       Physical Exam  Vitals and nursing note reviewed.   Constitutional:       General: He is awake. He is not in acute distress.     Appearance: He is ill-appearing.      Interventions: He is sedated, intubated and restrained.       HENT:      Head: Normocephalic.      Nose: No signs of injury.      Right Nostril: No epistaxis.      Left Nostril: No epistaxis.        Mouth/Throat:      Mouth: Mucous membranes are dry.      Comments: Dried blood on oral mucosa; airway patent  Cardiovascular:      Rate and Rhythm: Regular rhythm. Tachycardia present. No extrasystoles are present.     Pulses:           Radial pulses are 2+ on the right side and 2+ on the left side.   Pulmonary:      Effort: Pulmonary effort is normal. No tachypnea or respiratory distress. He is intubated.      Breath sounds: Normal breath sounds. No decreased breath sounds.   Abdominal:      General: Abdomen is protuberant. Bowel sounds are normal. There is no distension.      Tenderness: There is no abdominal tenderness.   Musculoskeletal:      Right lower leg: No edema.      Left lower leg: No edema.   Skin:     General: Skin is cool and dry.      Capillary Refill: Capillary refill takes 2 to 3 seconds.      Coloration: Skin is pale.   Neurological:       Mental Status: He is alert.      GCS: GCS eye subscore is 3. GCS verbal subscore is 5. GCS motor subscore is 5.   Psychiatric:         Behavior: Behavior is cooperative.       Vents:  Vent Mode: A/C (08/31/22 0516)  Ventilator Initiated: Yes (08/30/22 2041)  Set Rate: 20 BPM (08/31/22 0516)  Vt Set: 400 mL (08/31/22 0516)  Pressure Support: 0 cmH20 (08/31/22 0516)  PEEP/CPAP: 5 cmH20 (08/31/22 0516)  Oxygen Concentration (%): 28 (08/31/22 0708)  Peak Airway Pressure: 18 cmH2O (08/31/22 0516)  Plateau Pressure: 13 cmH20 (08/31/22 0516)  Total Ve: 8.16 mL (08/31/22 0516)  F/VT Ratio<105 (RSBI): (!) 49.14 (08/31/22 0516)    Lines/Drains/Airways       Central Venous Catheter Line  Duration                  Hemodialysis Catheter -- days              Drain  Duration                  Sheath 08/30/22 0547 Right anterior;proximal 1 day         Urethral Catheter 08/30/22 2201 Double-lumen 16 Fr. <1 day              Peripheral Intravenous Line  Duration                  Peripheral IV - Single Lumen 08/30/22 0052 20 G Anterior;Left;Proximal Forearm 1 day         Peripheral IV - Single Lumen 08/30/22 0714 20 G Anterior;Right Upper Arm 1 day                    Significant Labs:    CBC/Anemia Profile:  Recent Labs   Lab 08/30/22  2250 08/31/22  0208 08/31/22  0602   WBC 9.23 9.63 11.39   HGB 8.7* 8.4* 8.3*   HCT 25.0* 24.8* 23.9*   * 140* 142*   MCV 86 88 87   RDW 15.2* 15.1* 15.8*        Chemistries:  Recent Labs   Lab 08/29/22 2020 08/30/22  0305 08/31/22  0646    137 136   K 4.1 4.1 3.8    106 106   CO2 22* 19* 19*   * 118* 116*   CREATININE 6.4* 6.5* 5.7*   CALCIUM 7.5* 7.0* 7.1*   ALBUMIN 2.2* 1.6*  --    PROT 4.4*  --   --    BILITOT 0.5  --   --    ALKPHOS 43*  --   --    ALT 11  --   --    AST 11  --   --    MG 1.4* 1.2* 2.5   PHOS 2.5* 2.3*  --        ABGs:   Recent Labs   Lab 08/31/22  0431   PH 7.371   PCO2 38.1   HCO3 22.1*   POCSATURATED 100   BE -3       Coagulation:   Recent Labs    Lab 08/29/22  2020   INR 1.0       POCT Glucose:   Recent Labs   Lab 08/30/22  1750 08/31/22  0043 08/31/22  0652   POCTGLUCOSE 204* 255* 214*     All pertinent labs within the past 24 hours have been reviewed.    Significant Imaging:  I have reviewed all pertinent imaging results/findings within the past 24 hours.

## 2022-08-31 NOTE — PROGRESS NOTES
08/31/22 1000   Cycler Peritoneal Dialysis   Initial Drain Volume (mL) 1137 mL   Effluent Appearance Yellow   Number of Cycles 4   Fill Volume (mL) 2000 mL   Total Volume (mL) 20544 mL   Net Positive (mL) 848 mL   Cycler PD Total UF (mL) 15 mL   Cycler Treatment Comments PD catheter deaccessed per P&P. 24 hr UF was +848ml

## 2022-08-31 NOTE — ASSESSMENT & PLAN NOTE
66 y/o male with ESRD on PD and liver transplant presented with upper GI bleed:     ESRD on peritoneal dialysis  ESRD on PD, since Jan 2022  Cause of ESRD no clear at present. Had kidney biopsy in [place] ([facility] records reviewed). 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 Acute  S/p embolization  Acute blood loss  S/p PRBC transfusions x multiple units  Reviewed GI notes     Status post liver transplant  Reviewed the records, chart     DM2 (diabetes mellitus, type 2)  Reviewed the chart     Coronary artery disease involving native coronary artery of native heart without angina pectoris  Reviewed the chart        Plans and recommendations:  As discussed above  Total critical care time spent 70 minutes including time needed to review the records, the   patient evaluation, documentation, face-to-face discussion with the patient,   more than 50% of the time was spent on coordination of care and counseling.    Level V visit.  Multiple medical issues were addressed, as documented. Medical care provided was in addition to providing dialysis. Pt received multiple visits and evaluations.

## 2022-08-31 NOTE — PLAN OF CARE
Problem: Adult Inpatient Plan of Care  Goal: Plan of Care Review  Outcome: Ongoing, Progressing  Goal: Patient-Specific Goal (Individualized)  Outcome: Ongoing, Progressing  Goal: Absence of Hospital-Acquired Illness or Injury  Outcome: Ongoing, Progressing  Goal: Optimal Comfort and Wellbeing  Outcome: Ongoing, Progressing  Goal: Readiness for Transition of Care  Outcome: Ongoing, Progressing     Problem: Diabetes Comorbidity  Goal: Blood Glucose Level Within Targeted Range  Outcome: Ongoing, Progressing     Problem: Infection  Goal: Absence of Infection Signs and Symptoms  Outcome: Ongoing, Progressing     Problem: Impaired Wound Healing  Goal: Optimal Wound Healing  Outcome: Ongoing, Progressing     Problem: Fall Injury Risk  Goal: Absence of Fall and Fall-Related Injury  Outcome: Ongoing, Progressing     Problem: Skin Injury Risk Increased  Goal: Skin Health and Integrity  Outcome: Ongoing, Progressing     Problem: Restraint, Nonbehavioral (Nonviolent)  Goal: Absence of Harm or Injury  Outcome: Ongoing, Progressing     Problem: Communication Impairment (Mechanical Ventilation, Invasive)  Goal: Effective Communication  Outcome: Ongoing, Progressing     Problem: Device-Related Complication Risk (Mechanical Ventilation, Invasive)  Goal: Optimal Device Function  Outcome: Ongoing, Progressing     Problem: Inability to Wean (Mechanical Ventilation, Invasive)  Goal: Mechanical Ventilation Liberation  Outcome: Ongoing, Progressing     Problem: Nutrition Impairment (Mechanical Ventilation, Invasive)  Goal: Optimal Nutrition Delivery  Outcome: Ongoing, Progressing     Problem: Skin and Tissue Injury (Mechanical Ventilation, Invasive)  Goal: Absence of Device-Related Skin and Tissue Injury  Outcome: Ongoing, Progressing     Problem: Ventilator-Induced Lung Injury (Mechanical Ventilation, Invasive)  Goal: Absence of Ventilator-Induced Lung Injury  Outcome: Ongoing, Progressing     Problem: Communication Impairment  (Artificial Airway)  Goal: Effective Communication  Outcome: Ongoing, Progressing     Problem: Device-Related Complication Risk (Artificial Airway)  Goal: Optimal Device Function  Outcome: Ongoing, Progressing     Problem: Skin and Tissue Injury (Artificial Airway)  Goal: Absence of Device-Related Skin or Tissue Injury  Outcome: Ongoing, Progressing

## 2022-08-31 NOTE — PLAN OF CARE
Pt alert and oriented x4. Normal sinus/sinus tach on cardiac monitor. Room air with stable O2 sats. Hemodynamically stable. Afebrile. Fluids infusing.  Right femoral sheath remains in place. Trending H/Hs. Drop in H/H  1 unit PRBC given during shift. CTA complete. See results. GI plan to give repeat scope in morning. All alarms active and audible. Emergency equipment remains at bedside. Wife updated at bedside. Will continue to monitor.   Problem: Adult Inpatient Plan of Care  Goal: Plan of Care Review  Outcome: Ongoing, Progressing  Goal: Patient-Specific Goal (Individualized)  Outcome: Ongoing, Progressing  Goal: Absence of Hospital-Acquired Illness or Injury  Outcome: Ongoing, Progressing  Goal: Optimal Comfort and Wellbeing  Outcome: Ongoing, Progressing  Goal: Readiness for Transition of Care  Outcome: Ongoing, Progressing     Problem: Diabetes Comorbidity  Goal: Blood Glucose Level Within Targeted Range  Outcome: Ongoing, Progressing     Problem: Infection  Goal: Absence of Infection Signs and Symptoms  Outcome: Ongoing, Progressing     Problem: Impaired Wound Healing  Goal: Optimal Wound Healing  Outcome: Ongoing, Progressing     Problem: Fall Injury Risk  Goal: Absence of Fall and Fall-Related Injury  Outcome: Ongoing, Progressing     Problem: Skin Injury Risk Increased  Goal: Skin Health and Integrity  Outcome: Ongoing, Progressing     Problem: Restraint, Nonbehavioral (Nonviolent)  Goal: Absence of Harm or Injury  Outcome: Ongoing, Progressing     Problem: Communication Impairment (Mechanical Ventilation, Invasive)  Goal: Effective Communication  Outcome: Ongoing, Progressing     Problem: Device-Related Complication Risk (Mechanical Ventilation, Invasive)  Goal: Optimal Device Function  Outcome: Ongoing, Progressing     Problem: Inability to Wean (Mechanical Ventilation, Invasive)  Goal: Mechanical Ventilation Liberation  Outcome: Ongoing, Progressing     Problem: Nutrition Impairment (Mechanical  Ventilation, Invasive)  Goal: Optimal Nutrition Delivery  Outcome: Ongoing, Progressing     Problem: Skin and Tissue Injury (Mechanical Ventilation, Invasive)  Goal: Absence of Device-Related Skin and Tissue Injury  Outcome: Ongoing, Progressing     Problem: Ventilator-Induced Lung Injury (Mechanical Ventilation, Invasive)  Goal: Absence of Ventilator-Induced Lung Injury  Outcome: Ongoing, Progressing     Problem: Communication Impairment (Artificial Airway)  Goal: Effective Communication  Outcome: Ongoing, Progressing     Problem: Device-Related Complication Risk (Artificial Airway)  Goal: Optimal Device Function  Outcome: Ongoing, Progressing     Problem: Skin and Tissue Injury (Artificial Airway)  Goal: Absence of Device-Related Skin or Tissue Injury  Outcome: Ongoing, Progressing     Problem: Oral Intake Inadequate  Goal: Improved Oral Intake  Outcome: Ongoing, Progressing

## 2022-08-31 NOTE — PLAN OF CARE
Patient hgb dropped to 6.2 this afternoon. CT abd reviewed with IR. Case discussed with GI. Repeat CTA GI bleed was not recommended by IR. Will obtain NM gi bleed scan. Scheduled reglan. Transfuse prbc. Cont to monitor h/h. Transfuse hgb < 8. GI plan to repeat egd in am. 100cc gross hematuria noted. Discussed that amount should not cause this significant of a drop in h/h. No traumatic griffin reported. Will continue to monitor and consider urology consult should hematuria persist.

## 2022-08-31 NOTE — CONSULTS
[facility] - [facility]ensive [facility] ([facility])  Wound Care    Patient Name:  Alon Adamson   MRN:  [id number]  Date: 8/31/2022  Diagnosis: Hematemesis    History:     Past Medical History:   Diagnosis Date    Allergy     Anticoagulant long-term use     Arthritis     Back pain     Cellulitis     Congenital heart disease     Hearing loss     right ear    Hepatic encephalopathy     HTN (hypertension)     Hypertension     diagnosed today (11/25/2015) and prescribed toprol    Leg edema     Nephrolithiasis     JACK (obstructive sleep apnea)     JACK (obstructive sleep apnea)     Seizures     per Pt last seizure 2018- controlled wit medication    Splenomegaly        Social History     Socioeconomic History    Marital status:      Spouse name: Vin Adamson    Number of children: 1   Tobacco Use    Smoking status: Never    Smokeless tobacco: Former     Types: Chew     Quit date: 7/29/2018   Substance and Sexual Activity    Alcohol use: No     Alcohol/week: 0.0 standard drinks    Drug use: No    Sexual activity: Not Currently   Social History Narrative    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 Turned with assistance. Sacrum, coccyx, bilateral buttock intact with no redness or breakdown noted. Skin tear has resolved/re-epithelialized. Moisture barrier paste applied.    Recommendations made to primary team for pressure injury prevention, moisture management . Orders placed.     08/31/2022

## 2022-08-31 NOTE — SUBJECTIVE & OBJECTIVE
Interval History: EGD performed yesterday. See GI report. H/H stable. No further active bleeding noted. Will extubate patient. Will need CTA GI bleed should pt bleed again.     Review of Systems   Unable to perform ROS: Intubated   Objective:     Vital Signs (Most Recent):  Temp: 98 °F (36.7 °C) (08/31/22 0430)  Pulse: 98 (08/31/22 0630)  Resp: (!) 22 (08/31/22 0708)  BP: (!) 72/44 (08/31/22 0630)  SpO2: 100 % (08/31/22 0708)   Vital Signs (24h Range):  Temp:  [96.8 °F (36 °C)-98.3 °F (36.8 °C)] 98 °F (36.7 °C)  Pulse:  [] 98  Resp:  [14-42] 22  SpO2:  [88 %-100 %] 100 %  BP: ()/(44-79) 72/44  Arterial Line BP: ()/(40-93) 77/40     Weight: 111.3 kg (245 lb 6 oz)  Body mass index is 33.28 kg/m².    Intake/Output Summary (Last 24 hours) at 8/31/2022 0820  Last data filed at 8/31/2022 0600  Gross per 24 hour   Intake 3472.46 ml   Output 1000 ml   Net 2472.46 ml      Physical Exam  Constitutional:       General: He is not in acute distress.     Appearance: He is well-developed. He is not diaphoretic.   HENT:      Head: Normocephalic and atraumatic.   Eyes:      Pupils: Pupils are equal, round, and reactive to light.   Cardiovascular:      Rate and Rhythm: Normal rate and regular rhythm.      Heart sounds: Normal heart sounds. No murmur heard.    No friction rub. No gallop.   Pulmonary:      Effort: Pulmonary effort is normal. No respiratory distress.      Breath sounds: Normal breath sounds. No stridor. No wheezing or rales.      Comments: intubated  Abdominal:      General: Bowel sounds are normal. There is no distension.      Palpations: Abdomen is soft. There is no mass.      Tenderness: There is no abdominal tenderness. There is no guarding.   Skin:     General: Skin is warm.      Findings: No erythema.   Neurological:      Mental Status: He is alert. Mental status is at baseline.       Significant Labs: All pertinent labs within the past 24 hours have been reviewed.    Significant Imaging: I have  reviewed all pertinent imaging results/findings within the past 24 hours.

## 2022-08-31 NOTE — PROGRESS NOTES
[facility] - [facility]ensive Care ([facility])  Nephrology  Progress Note    Patient Name: Alon Adamson  MRN: [id number]  Admission Date: 8/29/2022  Hospital Length of Stay: 2 days  Attending Provider: Jef Senior MD   Primary Care Physician: Bruno Oconnor NP  Principal Problem:Hematemesis    Subjective:     HPI: Pt was seen and examined in ICU this am. Labs and meds reviewed. Discussed with other providers. Pt is a 68 y/o male with h/o of ESRD on PD who presented with upper GI bleed. Pt has a h/o of liver transplant in 2018 and developed kidney failure since then. Per wife, a kidney biopsy was done in [place], but she does not know what the biopsy showed. Pt was placed on PD in Jan 2022. Possibility of calcineurin inhibitor toxicity due to taking prograf for the liver transplant was discussed. She does not recall consistently elevated prograf levels.Pt also has a h/o of HTN and IGT. Pt was admitted for upper GI blood loss, he started throwing up bright red blood since 2 days prior to admit. Pt is s/p embolization of blood vessel that was actively bleeding the stomach since admit. No acute or new c/o's this am. Had last PD exchange 1 day before admit. Pt was re-evaluated during the day, no new issues reported.      Interval History: Pt was seen and examined this am in ICU. Labs and meds reviewed. Discussed with other providers. EGD results reviewed. Pt has no new c/o's, wife at bedside. No abd pain, no SOB. Pt tolerating PD well.       Review of patient's allergies indicates:   Allergen Reactions    Nsaids (non-steroidal anti-inflammatory drug)      D/t to liver disease.  Other reaction(s): Unknown    Penicillins Other (See Comments)     Tolerated pip-tazo in 8/2016 without issue  Tolerated cefepime 7/2018 without issue  Since childhood was told not to take it  Other reaction(s): Unknown     Current Facility-Administered Medications   Medication Frequency    0.9%  NaCl infusion (for blood administration) Q24H PRN    0.9%   NaCl infusion Continuous    aluminum-magnesium hydroxide-simethicone 200-200-20 mg/5 mL suspension 30 mL QID PRN    dextrose 10% bolus 125 mL PRN    glucagon (human recombinant) injection 1 mg PRN    glucose chewable tablet 16 g PRN    glucose chewable tablet 24 g PRN    insulin aspart U-100 pen 1-10 Units Q6H PRN    levothyroxine tablet 50 mcg Daily    melatonin tablet 6 mg Nightly PRN    mupirocin 2 % ointment BID    naloxone 0.4 mg/mL injection 0.02 mg PRN    ondansetron disintegrating tablet 8 mg Q8H PRN    ondansetron injection 4 mg Q6H PRN    pantoprazole 40 mg in  mL infusion (ready to mix system) Continuous    polyethylene glycol packet 17 g Daily    promethazine (PHENERGAN) 12.5 mg in dextrose 5 % 50 mL IVPB Q6H PRN    simethicone chewable tablet 80 mg QID PRN    sodium chloride 0.9% flush 10 mL Q8H PRN    tacrolimus capsule 1 mg Daily PM    tacrolimus capsule 2 mg Daily AM       Objective:     Vital Signs (Most Recent):  Temp: 98 °F (36.7 °C) (08/31/22 0800)  Pulse: 98 (08/31/22 0912)  Resp: 16 (08/31/22 0912)  BP: (!) 85/53 (08/31/22 0900)  SpO2: 100 % (08/31/22 0912)  O2 Device (Oxygen Therapy): room air (08/31/22 0912)   Vital Signs (24h Range):  Temp:  [96.8 °F (36 °C)-98.3 °F (36.8 °C)] 98 °F (36.7 °C)  Pulse:  [] 98  Resp:  [14-42] 16  SpO2:  [88 %-100 %] 100 %  BP: ()/(44-79) 85/53  Arterial Line BP: ()/(40-93) 77/40     Weight: 111.3 kg (245 lb 6 oz) (08/31/22 0600)  Body mass index is 33.28 kg/m².  Body surface area is 2.38 meters squared.    I/O last 3 completed shifts:  In: 5367.9 [I.V.:1676.7; Blood:2232.5; IV Piggyback:1458.8]  Out: 2225 [Urine:425; Emesis/NG output:1800]    Physical Exam  Vitals and nursing note reviewed.   Constitutional:       Appearance: Normal appearance.   Cardiovascular:      Rate and Rhythm: Normal rate and regular rhythm.      Pulses: Normal pulses.      Heart sounds: Normal heart sounds.   Pulmonary:      Effort: Pulmonary  effort is normal.      Breath sounds: Normal breath sounds.   Abdominal:      General: Abdomen is flat.      Tenderness: There is no abdominal tenderness.   Musculoskeletal:      Right lower leg: No edema.      Left lower leg: No edema.   Skin:     General: Skin is warm and dry.   Neurological:      Mental Status: He is alert and oriented to person, place, and time.   Psychiatric:         Behavior: Behavior normal.       Significant Labs: reviewed  BMP  Lab Results   Component Value Date     08/31/2022    K 3.8 08/31/2022     08/31/2022    CO2 19 (L) 08/31/2022     (H) 08/31/2022    CREATININE 5.7 (H) 08/31/2022    CALCIUM 7.1 (L) 08/31/2022    ANIONGAP 11 08/31/2022    ESTGFRAFRICA 16.1 (A) 03/03/2021    EGFRNONAA 14.0 (A) 03/03/2021     Lab Results   Component Value Date    WBC 11.39 08/31/2022    HGB 8.3 (L) 08/31/2022    HCT 23.9 (L) 08/31/2022    MCV 87 08/31/2022     (L) 08/31/2022         Significant Imaging: reviewed, including EGD studies    Assessment/Plan:     68 y/o male with ESRD on PD and liver transplant presented with upper GI bleed:     ESRD on peritoneal dialysis  ESRD on PD, (since Jan 2022)  Tolerating PD well  Given recent, large GI bleed, will reduce PD exchanges from 4 to 3 and will keep dextrose con at 1.5% to minimize further fluid loss.  K normal  Metabolic acidosis, stable  Na normal    Hypotensive, hypovolemic shock  BP at present not as low as earlier today  Likely will be able to tolerate further PD  Not on pressors  Will monitor    Cause of ESRD not clear at present. Had kidney biopsy in [place] ([facility] records reviewed). 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 Presented acutely  S/p embolization  Repeat EGD last pm showed no further active bleed  S/p PRBC transfusions x multiple units  Reviewed GI notes     Status post liver transplant  Reviewed the  records, chart     DM2 (diabetes mellitus, type 2)  Reviewed the chart     Coronary artery disease involving native coronary artery of native heart without angina pectoris  Reviewed the chart        Plans and recommendations:  As discussed above  Total critical care time spent 40 minutes including time needed to review the records, the   patient evaluation, documentation, face-to-face discussion with the patient,   more than 50% of the time was spent on coordination of care and counseling.    Level V visit.  Multiple medical issues were addressed, as documented. Medical care provided was in addition to providing dialysis. Pt received multiple visits and evaluations.          Kevan Mo MD  Nephrology  [facility] - Intensive [facility] ([facility])

## 2022-08-31 NOTE — PLAN OF CARE
Pt Awake and alert. Normal sinus/ sinus tach on cardiac monitor. Hemodynamically stable. Afebrile. O2 sats stable on room air. Right femoral sheath in place being transduced. Dressing clean, dry, and intact. NGT removed per GI MD bedside order. Pt bradycardic episode after NGT removal but returned to sinus rhythm quickly without intervention. Pt vomit bright red blood all day. Several episodes of emesis consisting of 300ml bright red blood. MD notified. GI to scope pt at bedside. 3 units of PRBCs given during shift. Wife updated at bedside. Emergency equipment remains at bedside. All alarms active and audible. Will continue to monitor.   Problem: Adult Inpatient Plan of Care  Goal: Plan of Care Review  Outcome: Ongoing, Progressing  Goal: Patient-Specific Goal (Individualized)  Outcome: Ongoing, Progressing  Goal: Absence of Hospital-Acquired Illness or Injury  Outcome: Ongoing, Progressing  Goal: Optimal Comfort and Wellbeing  Outcome: Ongoing, Progressing  Goal: Readiness for Transition of Care  Outcome: Ongoing, Progressing     Problem: Diabetes Comorbidity  Goal: Blood Glucose Level Within Targeted Range  Outcome: Ongoing, Progressing     Problem: Infection  Goal: Absence of Infection Signs and Symptoms  Outcome: Ongoing, Progressing     Problem: Impaired Wound Healing  Goal: Optimal Wound Healing  Outcome: Ongoing, Progressing     Problem: Fall Injury Risk  Goal: Absence of Fall and Fall-Related Injury  Outcome: Ongoing, Progressing     Problem: Skin Injury Risk Increased  Goal: Skin Health and Integrity  Outcome: Ongoing, Progressing

## 2022-08-31 NOTE — BRIEF OP NOTE
EGD completed. No active bleeding noted but large amount of old blood along with old and new clot material found in gastric fundus in area of previously noted gastric ulceration. Cleaned out clot and old blood for > 1 hour with > 1200 cc removed. However, there was still a large amount of residual blood and clot remaining with poor visibility of any underlying lesion. Therefore decision made to stop procedure at this time.     Recommendations:    - No active bleeding noted during endoscopy as mentioned above  - Continue to monitor trend in H&H and transfuse to keep Hb > 7  - Continue with PPI gtt  - NPO status  - Patient to remain intubated overnight   - If patient re-bleeds, recommend stat CTA (triple phase per IR recs due to patient's altered anatomy to look for any other arterial sources of bleed)  - Also, if patient becomes unstable, consider surgical consultation as patient has failed both endoscopic and IR interventions at this point    Breanna Palma MD  Gastroenterology and Hepatology

## 2022-08-31 NOTE — ANESTHESIA POSTPROCEDURE EVALUATION
Anesthesia Post Evaluation    Patient: Alon Adamson    Procedure(s) Performed: Procedure(s) (LRB):  EGD (ESOPHAGOGASTRODUODENOSCOPY) (N/A)    Final Anesthesia Type: general      Patient location during evaluation: ICU  Patient participation: No - Unable to Participate, Intubation  Level of consciousness: sedated  Post-procedure vital signs: reviewed and stable  Pain management: adequate  Airway patency: patent    PONV status at discharge: No PONV  Anesthetic complications: no      Cardiovascular status: blood pressure returned to baseline, stable and hemodynamically stable  Respiratory status: ETT, intubated and ventilator  Hydration status: euvolemic  Follow-up not needed.          Vitals Value Taken Time   BP 91/79 08/30/22 1818   Temp 36.7 °C (98 °F) 08/30/22 1800   Pulse 112 08/30/22 2037   Resp 20 08/30/22 2037   SpO2 100 % 08/30/22 2037   Vitals shown include unvalidated device data.      No case tracking events are documented in the log.      Pain/Ray Score: Ray Score: 10 (8/30/2022  6:04 AM)

## 2022-09-01 PROBLEM — K92.0 HEMATEMESIS: Status: RESOLVED | Noted: 2022-01-01 | Resolved: 2022-01-01

## 2022-09-01 NOTE — PLAN OF CARE
Problem: Adult Inpatient Plan of Care  Goal: Plan of Care Review  Outcome: Ongoing, Progressing  Goal: Patient-Specific Goal (Individualized)  Outcome: Ongoing, Progressing  Goal: Absence of Hospital-Acquired Illness or Injury  Outcome: Ongoing, Progressing  Goal: Optimal Comfort and Wellbeing  Outcome: Ongoing, Progressing  Goal: Readiness for Transition of Care  Outcome: Ongoing, Progressing     Problem: Diabetes Comorbidity  Goal: Blood Glucose Level Within Targeted Range  Outcome: Ongoing, Progressing     Problem: Infection  Goal: Absence of Infection Signs and Symptoms  Outcome: Ongoing, Progressing     Problem: Impaired Wound Healing  Goal: Optimal Wound Healing  Outcome: Ongoing, Progressing     Problem: Fall Injury Risk  Goal: Absence of Fall and Fall-Related Injury  Outcome: Ongoing, Progressing     Problem: Skin Injury Risk Increased  Goal: Skin Health and Integrity  Outcome: Ongoing, Progressing     Problem: Restraint, Nonbehavioral (Nonviolent)  Goal: Absence of Harm or Injury  Outcome: Ongoing, Progressing     Problem: Communication Impairment (Mechanical Ventilation, Invasive)  Goal: Effective Communication  Outcome: Ongoing, Progressing     Problem: Device-Related Complication Risk (Mechanical Ventilation, Invasive)  Goal: Optimal Device Function  Outcome: Ongoing, Progressing     Problem: Inability to Wean (Mechanical Ventilation, Invasive)  Goal: Mechanical Ventilation Liberation  Outcome: Ongoing, Progressing     Problem: Nutrition Impairment (Mechanical Ventilation, Invasive)  Goal: Optimal Nutrition Delivery  Outcome: Ongoing, Progressing     Problem: Skin and Tissue Injury (Mechanical Ventilation, Invasive)  Goal: Absence of Device-Related Skin and Tissue Injury  Outcome: Ongoing, Progressing     Problem: Ventilator-Induced Lung Injury (Mechanical Ventilation, Invasive)  Goal: Absence of Ventilator-Induced Lung Injury  Outcome: Ongoing, Progressing     Problem: Communication Impairment  (Artificial Airway)  Goal: Effective Communication  Outcome: Ongoing, Progressing     Problem: Device-Related Complication Risk (Artificial Airway)  Goal: Optimal Device Function  Outcome: Ongoing, Progressing     Problem: Skin and Tissue Injury (Artificial Airway)  Goal: Absence of Device-Related Skin or Tissue Injury  Outcome: Ongoing, Progressing     Problem: Oral Intake Inadequate  Goal: Improved Oral Intake  Outcome: Ongoing, Progressing

## 2022-09-01 NOTE — ASSESSMENT & PLAN NOTE
Management management per hepatology  Patient would be at high risk for surgery with prior liver transplant and no transplant surgeon available at this facility should the patient require exploratory surgery.  Would recommend transferring to a facility with liver transplant surgery available should the patient need surgical intervention

## 2022-09-01 NOTE — H&P
Short Stay Endoscopy History and Physical    PCP - Bruno Oconnor NP    Procedure - EGD  ASA - 4  Mallampati - per anesthesia  History of Anesthesia problems - no  Family history Anesthesia problems -  no     HPI:  This is a 67 y.o. male here for evaluation of :   Active Hospital Problems    Diagnosis  POA    *Bleeding gastric ulcer [K25.4]  Yes    Shock circulatory [R57.9]  Yes    JACK (obstructive sleep apnea) [G47.33]  Yes     Chronic    Peritonitis associated with peritoneal dialysis [T85.71XA]  Yes    ESRD on peritoneal dialysis [N18.6, Z99.2]  Not Applicable     Chronic    Status post liver transplant [Z94.4]  Not Applicable     Chronic    HTN (hypertension) [I10]  Yes    DM2 (diabetes mellitus, type 2) [E11.9]  Yes     Chronic    Coronary artery disease involving native coronary artery of native heart without angina pectoris [I25.10]  Yes     Chronic      Resolved Hospital Problems    Diagnosis Date Resolved POA    Hematemesis [K92.0] 09/01/2022 Yes       ROS:  CONSTITUTIONAL: Denies weight change,  fatigue, fevers, chills, night sweats.  CARDIOVASCULAR: Denies chest pain, shortness of breath, orthopnea and edema.  RESPIRATORY: Denies cough, hemoptysis, dyspnea, and wheezing.  GI: See HPI.    Medical History:   Past Medical History:   Diagnosis Date    Allergy     Anticoagulant long-term use     Arthritis     Back pain     Cellulitis     Congenital heart disease     Hearing loss     right ear    Hepatic encephalopathy     HTN (hypertension)     Hypertension     diagnosed today (11/25/2015) and prescribed toprol    Leg edema     Nephrolithiasis     JACK (obstructive sleep apnea)     JACK (obstructive sleep apnea)     Seizures     per Pt last seizure 2018- controlled wit medication    Splenomegaly        Surgical History:   Past Surgical History:   Procedure Laterality Date    APPENDECTOMY      Open    BACK SURGERY      CHOLECYSTECTOMY      laprascopic    COLONOSCOPY N/A 1/25/2018    Procedure: COLONOSCOPY;   Surgeon: Lashawn Myles MD;  Location: [facility] (2ND FLR);  Service: Endoscopy;  Laterality: N/A;    COLONOSCOPY N/A 12/20/2021    Procedure: COLONOSCOPY;  Surgeon: Jesus Grayson MD;  Location: [facility] (2ND FLR);  Service: Endoscopy;  Laterality: N/A;  start with a pediatric colonoscope and might need the slim pediatric colonoscope available.   priority case per Dr. Grayson-labwork am of procedure  RAPID COVID test coming for > 3 hrs away/ prep ins. [email or web address] / Pt requesting specific date for travel purposes    ESOPHAGOGASTRODUODENOSCOPY N/A 8/23/2022    Procedure: EGD (ESOPHAGOGASTRODUODENOSCOPY);  Surgeon: Nora Houser MD;  Location: [facility];  Service: Endoscopy;  Laterality: N/A;    ESOPHAGOGASTRODUODENOSCOPY N/A 8/30/2022    Procedure: EGD (ESOPHAGOGASTRODUODENOSCOPY);  Surgeon: Breanna Palma MD;  Location: [facility];  Service: Endoscopy;  Laterality: N/A;    FLUOROSCOPY N/A 8/30/2022    Procedure: FLUOROSCOPYembolization;  Surgeon: Elan Rodriguez Jr., MD;  Location: [facility] CATH LAB;  Service: General;  Laterality: N/A;    LIVER TRANSPLANT N/A 7/23/2018    Procedure: TRANSPLANT, LIVER;  Surgeon: Alma Joyce MD;  Location: [facility] OR 2ND FLR;  Service: Transplant;  Laterality: N/A;    LUMBAR DISCECTOMY      SPLENIC ARTERY EMBOLIZATION  04/08/2016    Dr Taveras    THORACENTESIS Left 8/3/2020    Procedure: Thoracentesis  U/S notified;  Surgeon: Augustine Rodriguez MD;  Location: [facility];  Service: Pulmonary;  Laterality: Left;    THORACENTESIS Left 4/14/2021    Procedure: Thoracentesis;  Surgeon: Augustine Rodriguez MD;  Location: [facility];  Service: Pulmonary;  Laterality: Left;    TONSILLECTOMY         Family History:  Family History   Problem Relation Age of Onset    Melanoma Mother     Heart attack Neg Hx     Heart disease Neg Hx     Hypertension Neg Hx        Social History:   Social History     Tobacco Use    Smoking status: Never    Smokeless tobacco: Former     Types:  Aline     Quit date: 7/29/2018   Substance Use Topics    Alcohol use: No     Alcohol/week: 0.0 standard drinks    Drug use: No       Allergy  Review of patient's allergies indicates:   Allergen Reactions    Nsaids (non-steroidal anti-inflammatory drug)      D/t to liver disease.  Other reaction(s): Unknown    Penicillins Other (See Comments)     Tolerated pip-tazo in 8/2016 without issue  Tolerated cefepime 7/2018 without issue  Since childhood was told not to take it  Other reaction(s): Unknown       Medications:   No current facility-administered medications on file prior to encounter.     Current Outpatient Medications on File Prior to Encounter   Medication Sig Dispense Refill    aspirin (ECOTRIN) 81 MG EC tablet Take 81 mg by mouth once daily.       atorvastatin (LIPITOR) 40 MG tablet Take 40 mg by mouth nightly.      B complex-vitamin C-folic acid (STAR-LAURY/NEPHRO-LAURY) 0.8 mg Tab Take 1 tablet by mouth once daily.      cyclobenzaprine (FLEXERIL) 5 MG tablet Take 5 mg by mouth 3 (three) times daily as needed.      ergocalciferol (VITAMIN D2) 50,000 unit Cap Take 50,000 Units by mouth every 7 days. (Saturdays)      ferrous sulfate 324 mg (65 mg iron) TbEC Take 1 tablet (324 mg total) by mouth once daily. 60 tablet 0    furosemide (LASIX) 40 MG tablet Take 80 mg by mouth 2 (two) times a day.      gentamicin (GARAMYCIN) 0.1 % cream Apply topically 2 (two) times daily.      levothyroxine (SYNTHROID) 50 MCG tablet Take 50 mcg by mouth once daily.      ondansetron (ZOFRAN) 4 MG tablet TAKE 1 TABLET BY MOUTH EVERY 8 HOURS FOR 4 DAYS      pantoprazole (PROTONIX) 40 MG tablet Take 1 tablet (40 mg total) by mouth 2 (two) times daily. 60 tablet 3    promethazine (PHENERGAN) 25 MG tablet Take 25 mg by mouth 3 (three) times daily as needed.      sevelamer carbonate (RENVELA) 800 mg Tab Take 1,600 mg by mouth 3 (three) times daily with meals.      sucralfate (CARAFATE) 1 gram tablet Take 1 tablet (1 g total) by mouth 4  (four) times daily before meals and nightly. for 14 days 56 tablet 0    tacrolimus (PROGRAF) 1 MG Cap Take 2 capsules (2 mg total) by mouth every morning AND 1 capsule (1 mg total) every evening. 90 capsule 11       Physical Exam:  Vital Signs:   Vitals:    09/01/22 1230   BP: (!) 109/59   Pulse: 107   Resp: (!) 38   Temp:      General Appearance: Well appearing in no acute distress  ENT: OP clear  Chest: CTA B  CV: RRR, no m/r/g  Abd: s/nt/nd/nabs  Ext: no edema    Labs:  Reviewed    IMP:  Active Hospital Problems    Diagnosis  POA    *Bleeding gastric ulcer [K25.4]  Yes    Shock circulatory [R57.9]  Yes    JACK (obstructive sleep apnea) [G47.33]  Yes     Chronic    Peritonitis associated with peritoneal dialysis [T85.71XA]  Yes    ESRD on peritoneal dialysis [N18.6, Z99.2]  Not Applicable     Chronic    Status post liver transplant [Z94.4]  Not Applicable     Chronic    HTN (hypertension) [I10]  Yes    DM2 (diabetes mellitus, type 2) [E11.9]  Yes     Chronic    Coronary artery disease involving native coronary artery of native heart without angina pectoris [I25.10]  Yes     Chronic      Resolved Hospital Problems    Diagnosis Date Resolved POA    Hematemesis [K92.0] 09/01/2022 Yes         Plan:  I have explained the risks and benefits of endoscopy procedures to the patient including but not limited to bleeding, perforation, infection, and death. The patient wishes to proceed.

## 2022-09-01 NOTE — ASSESSMENT & PLAN NOTE
- Intra-peritoneal Vancomycin initiated 08/26 August, fluid culture (+) S.hominis  - No noted tenderness on assessment  - Monitor fever curve, Tmax 100.8  - CBC daily

## 2022-09-01 NOTE — CONSULTS
[facility] - Intensive Care ([facility])  General Surgery  Consult Note    Patient Name: Alon Adamson  MRN: [id number]  Code Status: Full Code  Admission Date: 8/29/2022  Hospital Length of Stay: 3 days  Attending Physician: Jef Senior MD  Primary Care Provider: Bruno Oconnor NP    Patient information was obtained from patient.     Inpatient consult to General Surgery  Consult performed by: Ricky Storm MD  Consult ordered by: Aneesh Alas PA-C        Subjective:     Principal Problem: Bleeding gastric ulcer    History of Present Illness: 67-year-old male referred for upper GI bleed.  The patient has undergone EGD with clipping of bleeding gastric vessel as well as IR embolization.  EGD today showed no active bleeding.  H&H stable today.  History of liver transplant.      No current facility-administered medications on file prior to encounter.     Current Outpatient Medications on File Prior to Encounter   Medication Sig    aspirin (ECOTRIN) 81 MG EC tablet Take 81 mg by mouth once daily.     atorvastatin (LIPITOR) 40 MG tablet Take 40 mg by mouth nightly.    B complex-vitamin C-folic acid (STAR-LAURY/NEPHRO-LAURY) 0.8 mg Tab Take 1 tablet by mouth once daily.    cyclobenzaprine (FLEXERIL) 5 MG tablet Take 5 mg by mouth 3 (three) times daily as needed.    ergocalciferol (VITAMIN D2) 50,000 unit Cap Take 50,000 Units by mouth every 7 days. (Saturdays)    ferrous sulfate 324 mg (65 mg iron) TbEC Take 1 tablet (324 mg total) by mouth once daily.    furosemide (LASIX) 40 MG tablet Take 80 mg by mouth 2 (two) times a day.    gentamicin (GARAMYCIN) 0.1 % cream Apply topically 2 (two) times daily.    levothyroxine (SYNTHROID) 50 MCG tablet Take 50 mcg by mouth once daily.    ondansetron (ZOFRAN) 4 MG tablet TAKE 1 TABLET BY MOUTH EVERY 8 HOURS FOR 4 DAYS    pantoprazole (PROTONIX) 40 MG tablet Take 1 tablet (40 mg total) by mouth 2 (two) times daily.    promethazine (PHENERGAN) 25 MG tablet Take 25 mg by mouth  3 (three) times daily as needed.    sevelamer carbonate (RENVELA) 800 mg Tab Take 1,600 mg by mouth 3 (three) times daily with meals.    sucralfate (CARAFATE) 1 gram tablet Take 1 tablet (1 g total) by mouth 4 (four) times daily before meals and nightly. for 14 days    tacrolimus (PROGRAF) 1 MG Cap Take 2 capsules (2 mg total) by mouth every morning AND 1 capsule (1 mg total) every evening.       Review of patient's allergies indicates:   Allergen Reactions    Nsaids (non-steroidal anti-inflammatory drug)      D/t to liver disease.  Other reaction(s): Unknown    Penicillins Other (See Comments)     Tolerated pip-tazo in 8/2016 without issue  Tolerated cefepime 7/2018 without issue  Since childhood was told not to take it  Other reaction(s): Unknown       Past Medical History:   Diagnosis Date    Allergy     Anticoagulant long-term use     Arthritis     Back pain     Cellulitis     Congenital heart disease     Hearing loss     right ear    Hepatic encephalopathy     HTN (hypertension)     Hypertension     diagnosed today (11/25/2015) and prescribed toprol    Leg edema     Nephrolithiasis     JACK (obstructive sleep apnea)     JACK (obstructive sleep apnea)     Seizures     per Pt last seizure 2018- controlled wit medication    Splenomegaly      Past Surgical History:   Procedure Laterality Date    APPENDECTOMY      Open    BACK SURGERY      CHOLECYSTECTOMY      laprascopic    COLONOSCOPY N/A 1/25/2018    Procedure: COLONOSCOPY;  Surgeon: Lashawn Myles MD;  Location: [address]);  Service: Endoscopy;  Laterality: N/A;    COLONOSCOPY N/A 12/20/2021    Procedure: COLONOSCOPY;  Surgeon: Jesus Grayson MD;  Location: [address]);  Service: Endoscopy;  Laterality: N/A;  start with a pediatric colonoscope and might need the slim pediatric colonoscope available.   priority case per Dr. Grayson-labwork am of procedure  RAPID COVID test coming for > 3 hrs away/ prep ins.  [email or web address] / Pt requesting specific date for travel purposes    ESOPHAGOGASTRODUODENOSCOPY N/A 8/23/2022    Procedure: EGD (ESOPHAGOGASTRODUODENOSCOPY);  Surgeon: Nora Houser MD;  Location: [facility];  Service: Endoscopy;  Laterality: N/A;    ESOPHAGOGASTRODUODENOSCOPY N/A 8/30/2022    Procedure: EGD (ESOPHAGOGASTRODUODENOSCOPY);  Surgeon: Breanna Palma MD;  Location: [facility] ENDO;  Service: Endoscopy;  Laterality: N/A;    FLUOROSCOPY N/A 8/30/2022    Procedure: FLUOROSCOPYembolization;  Surgeon: Elan Rodriguez Jr., MD;  Location: [facility] CATH LAB;  Service: General;  Laterality: N/A;    LIVER TRANSPLANT N/A 7/23/2018    Procedure: TRANSPLANT, LIVER;  Surgeon: Alma Joyce MD;  Location: [facility] OR [address] [place] [address];  Service: Transplant;  Laterality: N/A;    LUMBAR DISCECTOMY      SPLENIC ARTERY EMBOLIZATION  04/08/2016    Dr Taveras    THORACENTESIS Left 8/3/2020    Procedure: Thoracentesis  U/S notified;  Surgeon: Augustine Rodriguez MD;  Location: [facility];  Service: Pulmonary;  Laterality: Left;    THORACENTESIS Left 4/14/2021    Procedure: Thoracentesis;  Surgeon: Augustine Rodriguez MD;  Location: [facility];  Service: Pulmonary;  Laterality: Left;    TONSILLECTOMY       Family History       Problem Relation (Age of Onset)    Melanoma Mother          Tobacco Use    Smoking status: Never    Smokeless tobacco: Former     Types: Chew     Quit date: 7/29/2018   Substance and Sexual Activity    Alcohol use: No     Alcohol/week: 0.0 standard drinks    Drug use: No    Sexual activity: Not Currently     Review of Systems   Unable to perform ROS: Other (drowsy secondary to sedation from procedure)   Objective:     Vital Signs (Most Recent):  Temp: 97.7 °F (36.5 °C) (09/01/22 1455)  Pulse: 99 (09/01/22 1505)  Resp: (!) 23 (09/01/22 1505)  BP: (!) 104/54 (09/01/22 1505)  SpO2: 98 % (09/01/22 1505)   Vital Signs (24h Range):  Temp:  [97.7 °F (36.5 °C)-100.8 °F (38.2 °C)] 97.7 °F (36.5  °C)  Pulse:  [] 99  Resp:  [15-38] 23  SpO2:  [95 %-100 %] 98 %  BP: ()/(44-62) 104/54  Arterial Line BP: ()/(38-76) 102/40     Weight: 112.8 kg (248 lb 10.9 oz)  Body mass index is 33.73 kg/m².    Physical Exam  Vitals reviewed.   Constitutional:       Appearance: He is obese. He is ill-appearing (Chronically).      Comments: Arousable   Cardiovascular:      Rate and Rhythm: Normal rate.   Pulmonary:      Effort: Pulmonary effort is normal.   Abdominal:      General: There is no distension.      Tenderness: There is no abdominal tenderness.      Comments: Well-healed surgical scars   Skin:     General: Skin is warm and dry.       Significant Labs:  I have reviewed all pertinent lab results within the past 24 hours.  CBC:   Recent Labs   Lab 09/01/22  1355   WBC 5.64   RBC 2.65*   HGB 7.8*   HCT 24.0*   PLT 82*   MCV 91   MCH 29.4   MCHC 32.5     CMP:   Recent Labs   Lab 09/01/22  0433   *   CALCIUM 7.2*   ALBUMIN 1.6*   PROT 3.7*      K 3.7   CO2 19*      *   CREATININE 5.6*   ALKPHOS 39*   ALT 5*   AST 10   BILITOT 0.3     Coagulation:   Recent Labs   Lab 09/01/22  0433   LABPROT 11.1   INR 1.0       Significant Diagnostics:  CT:  Impression:     Moderate rugal fold thickening within the stomach consistent with gastritis.  High density material within the stomach could reflect blood products.  Clips are seen in the fundal region of the stomach.  Evaluation for active hemorrhage limited due to phase of contrast.     Moderate left-sided pleural effusion with thick wall which could reflect a chronic effusion. Empyema is not excluded    EGD:  EGD completed. No old or fresh blood noted. No active bleeding. Exposed vessel noted in fundus of stomach with prior 3 clips in place. There was area of vessel that was exposed and not clipped off. Additional clip placed to this area. Vessel was then sprayed with water jet for a few seconds and no bleeding noted      Assessment/Plan:      * Bleeding gastric ulcer  No signs of active bleeding currently.  H&H stable today.  CT and EGD done showing no further or active bleeding  No surgical intervention as patient showing no signs active bleeding  If ablation continues to bleed would recommend transferring to a facility with liver transplant surgery available should the patient need surgical intervention, however if patient became unstable secondary to bleeding would be available for exploration should this be needed emergently.      ESRD on peritoneal dialysis  Management per Nephrology    Status post liver transplant  Management management per hepatology  Patient would be at high risk for surgery with prior liver transplant and no transplant surgeon available at this facility should the patient require exploratory surgery.  Would recommend transferring to a facility with liver transplant surgery available should the patient need surgical intervention    DM2 (diabetes mellitus, type 2)  Medical management    HTN (hypertension)  Medical management    Coronary artery disease involving native coronary artery of native heart without angina pectoris  Medical management      VTE Risk Mitigation (From admission, onward)         Ordered     Reason for No Pharmacological VTE Prophylaxis  Once        Question:  Reasons:  Answer:  Active Bleeding    08/29/22 1906     IP VTE HIGH RISK PATIENT  Once         08/29/22 1906     Place sequential compression device  Until discontinued         08/29/22 1906                Thank you for your consult. I will follow-up with patient. Please contact us if you have any additional questions.    Ricky Storm MD  General Surgery  [facility] - Intensive [facility] ([facility])

## 2022-09-01 NOTE — EICU
EICU Note    Call received from bedside RN that patient's H/H after receiving 2 units of PRBC is 7.3/21.5. Prior to transfusion the H/H was 6.2/18.2.    Plan:    -Transfuse 1 more unit PRBC

## 2022-09-01 NOTE — ANESTHESIA PREPROCEDURE EVALUATION
09/01/2022  Alon Adamson is a 67 y.o., male.    Patient Active Problem List   Diagnosis    Seizures    Thrombocytopenia    Anemia of chronic disease    Coronary artery disease involving native coronary artery of native heart without angina pectoris    Liver transplanted    At risk for opportunistic infections    Prophylactic immunotherapy    Neutropenia    Pleural effusion    HTN (hypertension)    DM2 (diabetes mellitus, type 2)    Status post liver transplant    Patient on waiting list for kidney transplant    ESRD on peritoneal dialysis    Shock circulatory    JACK (obstructive sleep apnea)    Peritonitis associated with peritoneal dialysis    Bleeding gastric ulcer     Pre-op Assessment    I have reviewed the Patient Summary Reports.    I have reviewed the NPO Status.   I have reviewed the Medications.     Review of Systems  Anesthesia Hx:  No problems with previous Anesthesia  Denies Family Hx of Anesthesia complications.   Denies Personal Hx of Anesthesia complications.   Social:  Non-Smoker    Hematology/Oncology:     Oncology Normal    -- Anemia:   EENT/Dental:EENT/Dental Normal   Cardiovascular:   Hypertension CAD   ECG has been reviewed. RBBB  ECHO  ? The left ventricle is normal in size with concentric remodeling and normal systolic function.  ? Small posterolateral pericardial effusion.  ? The estimated ejection fraction is 60%.  ? Normal left ventricular diastolic function.  ? Aortic sclerosis with trivial stenosis.  ? Mild tricuspid regurgitation.  ? There is abnormal septal wall motion most likely consistent with right bundle branch block.  ? Normal right ventricular size with normal right ventricular systolic function.  ? Normal central venous pressure (3 mmHg).  ? Insufficient TR envelope for PA pressure estimation. Measurement attempted.      Pulmonary:   Sleep Apnea  CXR - Left pleural effusion   Renal/:   Chronic Renal Disease, ESRD, Dialysis    Hepatic/GI:   PUD, Liver Disease, Hematemesis  Transfused  Liver transplant   Musculoskeletal:  Musculoskeletal Normal    Neurological:   Seizures    Endocrine:   Diabetes    Dermatological:  Skin Normal    Psych:  Psychiatric Normal           Physical Exam  General: Alert and Oriented    Airway:  Mallampati: II   Mouth Opening: Normal  TM Distance: Normal  Tongue: Normal  Neck ROM: Normal ROM    Dental:Edentulous maxilla  A few carious mandibular teeth      Anesthesia Plan  Type of Anesthesia, risks & benefits discussed:    Anesthesia Type: MAC, Gen ETT  Intra-op Monitoring Plan: Standard ASA Monitors  Airway Plan: , Awake  Informed Consent: Informed consent signed with the Patient and all parties understand the risks and agree with anesthesia plan.  All questions answered. Patient consented to blood products? Yes  ASA Score: 3  Day of Surgery Review of History & Physical: I have interviewed and examined the patient. I have reviewed the patient's H&P dated:     Ready For Surgery From Anesthesia Perspective.     .

## 2022-09-01 NOTE — TRANSFER OF CARE
Anesthesia Transfer of Care Note    Patient: Alon Adamson    Procedure(s) Performed: Procedure(s) (LRB):  EGD (ESOPHAGOGASTRODUODENOSCOPY) (N/A)    Patient location: PACU    Anesthesia Type: MAC    Transport from OR: Transported from OR on room air with adequate spontaneous ventilation    Post pain: adequate analgesia    Post assessment: no apparent anesthetic complications    Post vital signs: stable    Level of consciousness: awake and alert    Nausea/Vomiting: no nausea/vomiting    Complications: none    Transfer of care protocol was followed      Last vitals:   Visit Vitals  BP (!) 100/55   Pulse 102   Temp 36.8 °C (98.3 °F) (Temporal)   Resp (!) 22   Ht 6' (1.829 m)   Wt 112.8 kg (248 lb 10.9 oz)   SpO2 100%   BMI 33.73 kg/m²

## 2022-09-01 NOTE — PROGRESS NOTES
[facility] ([facility])  Critical Care Medicine  Progress Note    Patient Name: Alon Adamson  MRN: [id number]  Admission Date: 8/29/2022  Hospital Length of Stay: 3 days  Code Status: Full Code  Attending Provider: Jef Senior MD  Primary Care Provider: Bruno Oconnor NP   Principal Problem: Bleeding gastric ulcer    Subjective:     HPI:  67 year old male with a PMHx of EtOH cirrhosis s/p liver transplant 2018 (on tacrolimus), ESRD on peritoneal dialysis, JACK, CAD, HTN, and DM who presented to [facility] in [place], [place] the 8/29/22 morning with hematemesis x 3 days. Hypotensive on arrival and patient started on IVF, pantoprazole infusion, metronidazole, ciprofloxacin & received 4 U pRBC.  Hb has fallen since admission (9.1 > 7.0). Recently admitted to [facility] 8/23/22 for same complication; oozing gastric ulcer found on EGD, trated with bipolar cautery and hemostatic clip placement.     Accepted to [facility] for GI services. Upon arrival to ICU pt started on Protonix infusion but had recurrent large volume hematemesis episodes overnight; 2 additional units PRBCs, 1 unit PLT and FFP administered and pt brought to IR this AM for embolization of the left gastric artery.       Hospital/ICU Course:  8/30: Pt seen and examined once returned to ICU from cath lab. Currently sedated, on a Protonix drip. NGT currently clamped. VSS. Rt femoral sheath with no complications. Awaiting rpt labs.   8/31: Bedside EGD performed yesterday after rpt episodes of bright red hematemesis, no active bleeding found, washout performed. Hypotensive upon assessment, responsive to fluid bolus. No occurrences of bleeding overnight, H/H stable. Will extubate.     9/1 - Tolerating extubation.  PD dialysis overnight for 6 hours with 1.7 L removed.  Completed 12 th unit PRBC this AM.  No upper bleeding, only melena overnight.  Mild asymptomatic hypotension not requiring pressor support.  Still with mod  hematuria.  Upright in bed fully AA&OX3 in no distress with RA SAT 99%.       Review of Systems   Constitutional:  Positive for malaise/fatigue. Negative for chills and fever.   HENT:  Negative for congestion.    Eyes:  Negative for blurred vision.   Respiratory:  Positive for cough. Negative for sputum production and shortness of breath.    Cardiovascular:  Positive for leg swelling. Negative for chest pain.   Gastrointestinal:  Positive for melena. Negative for abdominal pain, nausea and vomiting.   Genitourinary:  Negative for dysuria.   Musculoskeletal:  Negative for myalgias.   Skin:  Negative for rash.   Neurological:  Negative for dizziness, weakness and headaches.   Endo/Heme/Allergies:  Does not bruise/bleed easily.   Psychiatric/Behavioral:  The patient is not nervous/anxious.        Objective:     Vital Signs (Most Recent):  Temp: 99.8 °F (37.7 °C) (09/01/22 0715)  Pulse: 101 (09/01/22 1030)  Resp: 20 (09/01/22 1030)  BP: (!) 94/52 (09/01/22 1030)  SpO2: 97 % (09/01/22 1030)   Vital Signs (24h Range):  Temp:  [97.9 °F (36.6 °C)-100.8 °F (38.2 °C)] 99.8 °F (37.7 °C)  Pulse:  [] 101  Resp:  [15-25] 20  SpO2:  [95 %-100 %] 97 %  BP: ()/(44-58) 94/52  Arterial Line BP: ()/(41-56) 108/44     Weight: 112.8 kg (248 lb 10.9 oz)  Body mass index is 33.73 kg/m².      Intake/Output Summary (Last 24 hours) at 9/1/2022 1045  Last data filed at 9/1/2022 1000  Gross per 24 hour   Intake 3709.4 ml   Output 2018 ml   Net 1691.4 ml       Physical Exam  Vitals and nursing note reviewed.   Constitutional:       General: He is awake. He is not in acute distress.     Appearance: He is well-developed. He is obese. He is ill-appearing. He is not toxic-appearing or diaphoretic.      Interventions: He is not intubated.  HENT:      Head: Normocephalic and atraumatic.      Mouth/Throat:      Mouth: Mucous membranes are moist.   Eyes:      General: Lids are normal.      Pupils: Pupils are equal, round, and reactive  to light.   Neck:      Trachea: Trachea normal.   Cardiovascular:      Rate and Rhythm: Regular rhythm. Tachycardia present.      Pulses:           Radial pulses are 1+ on the right side and 1+ on the left side.        Dorsalis pedis pulses are 1+ on the right side and 1+ on the left side.      Heart sounds: Murmur heard.   Systolic murmur is present.   Pulmonary:      Effort: Pulmonary effort is normal. No tachypnea, accessory muscle usage or respiratory distress. He is not intubated.      Breath sounds: Examination of the right-lower field reveals decreased breath sounds. Examination of the left-lower field reveals decreased breath sounds. Decreased breath sounds present.   Chest:      Chest wall: No deformity or tenderness.   Abdominal:      General: Bowel sounds are decreased. There is no distension.      Palpations: Abdomen is soft.      Tenderness: There is no abdominal tenderness.          Comments: Obese   Genitourinary:     Penis: Normal.    Musculoskeletal:         General: Normal range of motion.      Cervical back: Normal range of motion.      Right lower le+ Pitting Edema present.      Left lower le+ Pitting Edema present.      Right foot: No deformity.      Left foot: No deformity.   Lymphadenopathy:      Cervical: No cervical adenopathy.   Skin:     General: Skin is warm and dry.      Capillary Refill: Capillary refill takes less than 2 seconds.      Findings: No rash.          Neurological:      General: No focal deficit present.      Mental Status: He is alert and oriented to person, place, and time.   Psychiatric:         Attention and Perception: Attention normal.         Mood and Affect: Mood normal.         Speech: Speech normal.         Behavior: Behavior normal. Behavior is cooperative.         Thought Content: Thought content normal.         Cognition and Memory: Cognition normal.         Judgment: Judgment normal.       Vents:  Vent Mode: A/C (22 0516)  Ventilator Initiated:  Yes (08/30/22 2041)  Set Rate: 20 BPM (08/31/22 0516)  Vt Set: 400 mL (08/31/22 0516)  Pressure Support: 0 cmH20 (08/31/22 0516)  PEEP/CPAP: 5 cmH20 (08/31/22 0516)  Oxygen Concentration (%): 21 (08/31/22 0912)  Peak Airway Pressure: 18 cmH2O (08/31/22 0516)  Plateau Pressure: 13 cmH20 (08/31/22 0516)  Total Ve: 8.16 mL (08/31/22 0516)  F/VT Ratio<105 (RSBI): (!) 49.14 (08/31/22 0516)    Lines/Drains/Airways       Central Venous Catheter Line  Duration                  Hemodialysis Catheter -- days              Drain  Duration                  Sheath 08/30/22 0547 Right anterior;proximal 2 days              Peripheral Intravenous Line  Duration                  Peripheral IV - Single Lumen 08/30/22 0052 20 G Anterior;Left;Proximal Forearm 2 days         Peripheral IV - Single Lumen 08/30/22 0714 20 G Anterior;Right Upper Arm 2 days                    Significant Labs:    CBC/Anemia Profile:  Recent Labs   Lab 09/01/22  0046 09/01/22  0433 09/01/22  0958   WBC 7.85 7.57 5.94   HGB 7.3* 7.9* 7.9*   HCT 21.5* 23.2* 23.5*   PLT 90* 89* 85*   MCV 87 87 88   RDW 15.6* 15.9* 15.8*        Chemistries:  Recent Labs   Lab 08/31/22  0646 09/01/22 0433    140   K 3.8 3.7    110   CO2 19* 19*   * 115*   CREATININE 5.7* 5.6*   CALCIUM 7.1* 7.2*   ALBUMIN  --  1.6*   PROT  --  3.7*   BILITOT  --  0.3   ALKPHOS  --  39*   ALT  --  5*   AST  --  10   MG 2.5 2.2   PHOS  --  3.5       Coagulation:   Recent Labs   Lab 09/01/22  0433   INR 1.0     POCT Glucose:   Recent Labs   Lab 08/31/22  1236 08/31/22  1727 09/01/22  0601   POCTGLUCOSE 161* 149* 136*     All pertinent labs within the past 24 hours have been reviewed.        ABG  Recent Labs   Lab 08/31/22  0431   PH 7.371   PO2 200*   PCO2 38.1   HCO3 22.1*   BE -3     Assessment/Plan:     Cardiac/Vascular  Coronary artery disease involving native coronary artery of native heart without angina pectoris  - Hold ASA in setting of active bleed  - Cont cardiac  monitoring    Shock circulatory  S/P IVFs and blood products  Currently SBP  and asymptomatic  No pressor indicated  Volume replacement as needed.     Renal/  ESRD on peritoneal dialysis  - Nephrology following  - ESRD on PD since 01/2022, appears at baseline  - had PD last night    ID  Peritonitis associated with peritoneal dialysis  - Intra-peritoneal Vancomycin initiated 08/26 August, fluid culture (+) S.hominis  - No noted tenderness on assessment  - Monitor fever curve, Tmax 100.8  - CBC daily    ABLA   Transfused 12 units PRBCs last received early this AM   Monitor for further bleeding and transfuse as needed     Endocrine  DM2 (diabetes mellitus, type 2)  - NPO; diabetic diet when appropriate  - Mod SSI    GI  * Bleeding gastric ulcer  S/P Gelfoam embolization per IR of left gastric artery 8/30  S/P EGD 8/30 with No active bleeding noted but large amount of old blood along with old and new clot material found in gastric fundus in area of previously noted gastric ulceration. Cleaned out clot and old blood for > 1 hour with > 1200 cc removed. However, there was still a large amount of residual blood and clot remaining with poor visibility of any underlying lesion. Therefore decision made to stop procedure at this time.     S/P multiple PRBC transfusions  On PPI infusion  NPO  Planning repeat EGD today    Status post liver transplant  - ETOH abuse related  - Per GI mgmt of meds, currently on tacrolimus    Other  JACK (obstructive sleep apnea)  - No hx of CPAP utilization  - Cont O2 monitoring  - patient refuses CPAP        Preventive Measures and Monitoring:   Stress Ulcer: PPI infusion  Nutrition: NPO  Glucose control: SSI  Bowel prophylaxis: having melena stools  DVT prophylaxis: SCDs  Hx CAD on B-Blocker: none due to GI bleed and hypotension  Head of Bed/Reposition: Elevate HOB and turn Q1-2 hours   Early Mobility: bed rest  Right Femoral Art Sheath Day: #3  Code Status:  Full    Counseling/Consultation:I have discussed the care of this patient in detail with the bedside nursing staff and Dr. Kellogg and Dr. Senior    Critical Care Time: 53 minutes  Critical secondary to Patient has a condition that poses threat to life and bodily function: ABLA due to acute gastric ulcer hemorrhage with hypotension      Critical care was time spent personally by me on the following activities: development of treatment plan with patient or surrogate and bedside caregivers, discussions with consultants, evaluation of patient's response to treatment, examination of patient, ordering and performing treatments and interventions, ordering and review of laboratory studies, ordering and review of radiographic studies, pulse oximetry, re-evaluation of patient's condition. This critical care time did not overlap with that of any other provider or involve time for any procedures.     Elan Yancey NP  Critical Care Medicine  [facility])

## 2022-09-01 NOTE — HPI
67-year-old male referred for upper GI bleed.  The patient has undergone EGD with clipping of bleeding gastric vessel as well as IR embolization.  EGD today showed no active bleeding.  H&H stable today.  History of liver transplant.

## 2022-09-01 NOTE — PLAN OF CARE
Pt continues to have black liquid stools. Taken down for EGD today and additional clip placed to stop/prevent bleeding. VSS this shift with MAP in low-mid 60's, MD aware. R. Femoral sheath to be removed once ACT below 160.

## 2022-09-01 NOTE — NURSING
Notified IVY Osorio with critical care med of soft BPs. Cuff pressures noted higher than femoral art sheath pressures. Pt asymptomatic at this time. No new orders. Will continue to monitor.

## 2022-09-01 NOTE — BRIEF OP NOTE
EGD completed. No old or fresh blood noted. No active bleeding. Exposed vessel noted in fundus of stomach with prior 3 clips in place. There was area of vessel that was exposed and not clipped off. Additional clip placed to this area. Vessel was then sprayed with water jet for a few seconds and no bleeding noted.     Recommendations:     - Continue to monitor trend in H&H and transfuse PRN to keep Hb > 7  - May continue with IV PPI for next 24-28 hrs  - If re-bleeds, need to contact surgery service who are aware of patient  - No further GI recs at this time    Breanna Palma MD  Gastroenterology and Hepatology

## 2022-09-01 NOTE — PROGRESS NOTES
[facility] ([facility])  Nephrology  Progress Note    Patient Name: Alon Adamson  MRN: [id number]  Admission Date: 8/29/2022  Hospital Length of Stay: 3 days  Attending Provider: Jef Senior MD   Primary Care Physician: Bruno Oconnor NP  Principal Problem:Bleeding gastric ulcer    Subjective:     HPI: Pt was seen and examined in ICU this am. Labs and meds reviewed. Discussed with other providers. Pt is a 68 y/o male with h/o of ESRD on PD who presented with upper GI bleed. Pt has a h/o of liver transplant in 2018 and developed kidney failure since then. Per wife, a kidney biopsy was done in [place], but she does not know what the biopsy showed. Pt was placed on PD in Jan 2022. Possibility of calcineurin inhibitor toxicity due to taking prograf for the liver transplant was discussed. She does not recall consistently elevated prograf levels.Pt also has a h/o of HTN and IGT. Pt was admitted for upper GI blood loss, he started throwing up bright red blood since 2 days prior to admit. Pt is s/p embolization of blood vessel that was actively bleeding the stomach since admit. No acute or new c/o's this am. Had last PD exchange 1 day before admit. Pt was re-evaluated during the day, no new issues reported.      Interval History: Pt was seen and examined in ICU this am and was revisited during the day. Labs and meds reviewed. Discussed with other providers. Tolerated PD well, no c/o's with PD. Discussed with dialysis nurse. No new events. Has dark stools, but no new upper GI bleed reported. Is scheduled for repeat EGD today.    Review of patient's allergies indicates:   Allergen Reactions    Nsaids (non-steroidal anti-inflammatory drug)      D/t to liver disease.  Other reaction(s): Unknown    Penicillins Other (See Comments)     Tolerated pip-tazo in 8/2016 without issue  Tolerated cefepime 7/2018 without issue  Since childhood was told not to take it  Other reaction(s): Unknown     Current Facility-Administered  Medications   Medication Frequency    0.9%  NaCl infusion (for blood administration) Q24H PRN    0.9%  NaCl infusion Continuous    aluminum-magnesium hydroxide-simethicone 200-200-20 mg/5 mL suspension 30 mL QID PRN    dextrose 10% bolus 125 mL PRN    glucagon (human recombinant) injection 1 mg PRN    glucose chewable tablet 16 g PRN    glucose chewable tablet 24 g PRN    insulin aspart U-100 pen 1-10 Units Q6H PRN    levothyroxine tablet 50 mcg Daily    melatonin tablet 6 mg Nightly PRN    metoclopramide HCl injection 5 mg Q6H    mupirocin 2 % ointment BID    naloxone 0.4 mg/mL injection 0.02 mg PRN    ondansetron disintegrating tablet 8 mg Q8H PRN    ondansetron injection 4 mg Q6H PRN    pantoprazole 40 mg in  mL infusion (ready to mix system) Continuous    polyethylene glycol packet 17 g Daily    promethazine (PHENERGAN) 12.5 mg in dextrose 5 % 50 mL IVPB Q6H PRN    simethicone chewable tablet 80 mg QID PRN    sodium chloride 0.9% flush 10 mL Q8H PRN    tacrolimus capsule 1 mg Daily PM    tacrolimus capsule 2 mg Daily AM       Objective:     Vital Signs (Most Recent):  Temp: 99.8 °F (37.7 °C) (09/01/22 0715)  Pulse: 101 (09/01/22 1030)  Resp: 20 (09/01/22 1030)  BP: (!) 94/52 (09/01/22 1030)  SpO2: 97 % (09/01/22 1030)  O2 Device (Oxygen Therapy): room air (09/01/22 0700)   Vital Signs (24h Range):  Temp:  [97.9 °F (36.6 °C)-100.8 °F (38.2 °C)] 99.8 °F (37.7 °C)  Pulse:  [] 101  Resp:  [15-25] 20  SpO2:  [95 %-100 %] 97 %  BP: ()/(44-58) 94/52  Arterial Line BP: ()/(41-56) 108/44     Weight: 112.8 kg (248 lb 10.9 oz) (09/01/22 0608)  Body mass index is 33.73 kg/m².  Body surface area is 2.39 meters squared.    I/O last 3 completed shifts:  In: 6629 [I.V.:3496.3; Blood:1125; IV Piggyback:2007.7]  Out: 370 [Urine:355; Other:15]    Physical Exam  Vitals and nursing note reviewed.   Constitutional:       Appearance: Normal appearance.   Cardiovascular:      Rate and  Rhythm: Normal rate and regular rhythm.      Pulses: Normal pulses.      Heart sounds: Normal heart sounds.   Pulmonary:      Effort: Pulmonary effort is normal.      Breath sounds: Normal breath sounds.   Abdominal:      General: Abdomen is flat.      Tenderness: There is no abdominal tenderness.   Musculoskeletal:      Right lower leg: No edema.      Left lower leg: No edema.   Skin:     General: Skin is warm and dry.   Neurological:      Mental Status: He is alert and oriented to person, place, and time.   Psychiatric:         Mood and Affect: Mood normal.         Behavior: Behavior normal.       Significant Labs: reviewed  Lab Results   Component Value Date    WBC 5.94 09/01/2022    HGB 7.9 (L) 09/01/2022    HCT 23.5 (L) 09/01/2022    MCV 88 09/01/2022    PLT 85 (L) 09/01/2022       BMP  Lab Results   Component Value Date     09/01/2022    K 3.7 09/01/2022     09/01/2022    CO2 19 (L) 09/01/2022     (H) 09/01/2022    CREATININE 5.6 (H) 09/01/2022    CALCIUM 7.2 (L) 09/01/2022    ANIONGAP 11 09/01/2022    ESTGFRAFRICA 16.1 (A) 03/03/2021    EGFRNONAA 14.0 (A) 03/03/2021         Significant Imaging: reviewed    Assessment/Plan:     66 y/o male with ESRD on PD and liver transplant presented with upper GI bleed:     ESRD on peritoneal dialysis  ESRD on PD, (since Jan 2022)  Tolerating PD well. Continue PD  Given recent, large GI bleed, on only 3 PD exchanges nightly and on dextrose conc at 1.5% to minimize further fluid loss.  K normal  Metabolic acidosis, stable  Na normal     Hypotensive, hypovolemic shock. Stable, BP not as low  No longer on pressors  Likely will be able to tolerate further PD  Will monitor     Cause of ESRD not clear at present. Had kidney biopsy in [place] ([facility] records reviewed). 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 Presented acutely  S/p  embolization  Repeat EGD 2 days agp showed no further active bleed  Repeat EGD planned for today  H/h relatively stable, but has required further blood transfusion   S/p PRBC transfusions x multiple units  Reviewed GI notes     Status post liver transplant  Reviewed the records, chart     DM2 (diabetes mellitus, type 2)  Reviewed the chart     Coronary artery disease involving native coronary artery of native heart without angina pectoris  Reviewed the chart        Plans and recommendations:  As discussed above  Total critical care time spent 40 minutes including time needed to review the records, the   patient evaluation, documentation, face-to-face discussion with the patient,   more than 50% of the time was spent on coordination of care and counseling.    Level V visit.  Multiple medical issues were addressed, as documented. Medical care provided was in addition to providing dialysis. Pt received multiple visits and evaluations.          Kevan Mo MD  Nephrology  [facility] ([facility])

## 2022-09-01 NOTE — ASSESSMENT & PLAN NOTE
S/P Gelfoam embolization per IR of left gastric artery 8/30  S/P EGD 8/30 with No active bleeding noted but large amount of old blood along with old and new clot material found in gastric fundus in area of previously noted gastric ulceration. Cleaned out clot and old blood for > 1 hour with > 1200 cc removed. However, there was still a large amount of residual blood and clot remaining with poor visibility of any underlying lesion. Therefore decision made to stop procedure at this time.     S/P multiple PRBC transfusions  On PPI infusion  NPO  Planning repeat EGD today

## 2022-09-01 NOTE — ASSESSMENT & PLAN NOTE
No signs of active bleeding currently.  H&H stable today.  CT and EGD done showing no further or active bleeding  No surgical intervention as patient showing no signs active bleeding  If ablation continues to bleed would recommend transferring to a facility with liver transplant surgery available should the patient need surgical intervention, however if patient became unstable secondary to bleeding would be available for exploration should this be needed emergently.

## 2022-09-01 NOTE — SUBJECTIVE & OBJECTIVE
Review of Systems   Constitutional:  Positive for malaise/fatigue. Negative for chills and fever.   HENT:  Negative for congestion.    Eyes:  Negative for blurred vision.   Respiratory:  Positive for cough. Negative for sputum production and shortness of breath.    Cardiovascular:  Positive for leg swelling. Negative for chest pain.   Gastrointestinal:  Positive for melena. Negative for abdominal pain, nausea and vomiting.   Genitourinary:  Negative for dysuria.   Musculoskeletal:  Negative for myalgias.   Skin:  Negative for rash.   Neurological:  Negative for dizziness, weakness and headaches.   Endo/Heme/Allergies:  Does not bruise/bleed easily.   Psychiatric/Behavioral:  The patient is not nervous/anxious.        Objective:     Vital Signs (Most Recent):  Temp: 99.8 °F (37.7 °C) (09/01/22 0715)  Pulse: 101 (09/01/22 1030)  Resp: 20 (09/01/22 1030)  BP: (!) 94/52 (09/01/22 1030)  SpO2: 97 % (09/01/22 1030)   Vital Signs (24h Range):  Temp:  [97.9 °F (36.6 °C)-100.8 °F (38.2 °C)] 99.8 °F (37.7 °C)  Pulse:  [] 101  Resp:  [15-25] 20  SpO2:  [95 %-100 %] 97 %  BP: ()/(44-58) 94/52  Arterial Line BP: ()/(41-56) 108/44     Weight: 112.8 kg (248 lb 10.9 oz)  Body mass index is 33.73 kg/m².      Intake/Output Summary (Last 24 hours) at 9/1/2022 1045  Last data filed at 9/1/2022 1000  Gross per 24 hour   Intake 3709.4 ml   Output 2018 ml   Net 1691.4 ml       Physical Exam  Vitals and nursing note reviewed.   Constitutional:       General: He is awake. He is not in acute distress.     Appearance: He is well-developed. He is obese. He is ill-appearing. He is not toxic-appearing or diaphoretic.      Interventions: He is not intubated.  HENT:      Head: Normocephalic and atraumatic.      Mouth/Throat:      Mouth: Mucous membranes are moist.   Eyes:      General: Lids are normal.      Pupils: Pupils are equal, round, and reactive to light.   Neck:      Trachea: Trachea normal.   Cardiovascular:      Rate  and Rhythm: Regular rhythm. Tachycardia present.      Pulses:           Radial pulses are 1+ on the right side and 1+ on the left side.        Dorsalis pedis pulses are 1+ on the right side and 1+ on the left side.      Heart sounds: Murmur heard.   Systolic murmur is present.   Pulmonary:      Effort: Pulmonary effort is normal. No tachypnea, accessory muscle usage or respiratory distress. He is not intubated.      Breath sounds: Examination of the right-lower field reveals decreased breath sounds. Examination of the left-lower field reveals decreased breath sounds. Decreased breath sounds present.   Chest:      Chest wall: No deformity or tenderness.   Abdominal:      General: Bowel sounds are decreased. There is no distension.      Palpations: Abdomen is soft.      Tenderness: There is no abdominal tenderness.          Comments: Obese   Genitourinary:     Penis: Normal.    Musculoskeletal:         General: Normal range of motion.      Cervical back: Normal range of motion.      Right lower le+ Pitting Edema present.      Left lower le+ Pitting Edema present.      Right foot: No deformity.      Left foot: No deformity.   Lymphadenopathy:      Cervical: No cervical adenopathy.   Skin:     General: Skin is warm and dry.      Capillary Refill: Capillary refill takes less than 2 seconds.      Findings: No rash.          Neurological:      General: No focal deficit present.      Mental Status: He is alert and oriented to person, place, and time.   Psychiatric:         Attention and Perception: Attention normal.         Mood and Affect: Mood normal.         Speech: Speech normal.         Behavior: Behavior normal. Behavior is cooperative.         Thought Content: Thought content normal.         Cognition and Memory: Cognition normal.         Judgment: Judgment normal.       Vents:  Vent Mode: A/C (22)  Ventilator Initiated: Yes (22)  Set Rate: 20 BPM (22)  Vt Set: 400 mL  (08/31/22 0516)  Pressure Support: 0 cmH20 (08/31/22 0516)  PEEP/CPAP: 5 cmH20 (08/31/22 0516)  Oxygen Concentration (%): 21 (08/31/22 0912)  Peak Airway Pressure: 18 cmH2O (08/31/22 0516)  Plateau Pressure: 13 cmH20 (08/31/22 0516)  Total Ve: 8.16 mL (08/31/22 0516)  F/VT Ratio<105 (RSBI): (!) 49.14 (08/31/22 0516)    Lines/Drains/Airways       Central Venous Catheter Line  Duration                  Hemodialysis Catheter -- days              Drain  Duration                  Sheath 08/30/22 0547 Right anterior;proximal 2 days              Peripheral Intravenous Line  Duration                  Peripheral IV - Single Lumen 08/30/22 0052 20 G Anterior;Left;Proximal Forearm 2 days         Peripheral IV - Single Lumen 08/30/22 0714 20 G Anterior;Right Upper Arm 2 days                    Significant Labs:    CBC/Anemia Profile:  Recent Labs   Lab 09/01/22  0046 09/01/22  0433 09/01/22  0958   WBC 7.85 7.57 5.94   HGB 7.3* 7.9* 7.9*   HCT 21.5* 23.2* 23.5*   PLT 90* 89* 85*   MCV 87 87 88   RDW 15.6* 15.9* 15.8*        Chemistries:  Recent Labs   Lab 08/31/22  0646 09/01/22  0433    140   K 3.8 3.7    110   CO2 19* 19*   * 115*   CREATININE 5.7* 5.6*   CALCIUM 7.1* 7.2*   ALBUMIN  --  1.6*   PROT  --  3.7*   BILITOT  --  0.3   ALKPHOS  --  39*   ALT  --  5*   AST  --  10   MG 2.5 2.2   PHOS  --  3.5       Coagulation:   Recent Labs   Lab 09/01/22  0433   INR 1.0     POCT Glucose:   Recent Labs   Lab 08/31/22  1236 08/31/22  1727 09/01/22  0601   POCTGLUCOSE 161* 149* 136*     All pertinent labs within the past 24 hours have been reviewed.

## 2022-09-01 NOTE — ASSESSMENT & PLAN NOTE
66 y/o male with ESRD on PD and liver transplant presented with upper GI bleed:     ESRD on peritoneal dialysis  ESRD on PD, (since Jan 2022)  Tolerating PD well  Given recent, large GI bleed, will reduce PD exchanges from 4 to 3 and will keep dextrose con at 1.5% to minimize further fluid loss.  K normal  Metabolic acidosis, stable  Na normal     Hypotensive, hypovolemic shock  BP at present not as low as earlier today  Likely will be able to tolerate further PD  Not on pressors  Will monitor     Cause of ESRD not clear at present. Had kidney biopsy in [place] ([facility] records reviewed). 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 Presented acutely  S/p embolization  Repeat EGD last pm showed no further active bleed  S/p PRBC transfusions x multiple units  Reviewed GI notes     Status post liver transplant  Reviewed the records, chart     DM2 (diabetes mellitus, type 2)  Reviewed the chart     Coronary artery disease involving native coronary artery of native heart without angina pectoris  Reviewed the chart        Plans and recommendations:  As discussed above  Total critical care time spent 40 minutes including time needed to review the records, the   patient evaluation, documentation, face-to-face discussion with the patient,   more than 50% of the time was spent on coordination of care and counseling.    Level V visit.  Multiple medical issues were addressed, as documented. Medical care provided was in addition to providing dialysis. Pt received multiple visits and evaluations.

## 2022-09-01 NOTE — NURSING
Pulled R femoral arterial sheath. Pt tolerated well, no complications. Held pressure for 15 minutes. Pt educated about bedrest for 4 hrs, pt verbalized understanding.

## 2022-09-01 NOTE — ANESTHESIA POSTPROCEDURE EVALUATION
Anesthesia Post Evaluation    Patient: Alon Adamson    Procedure(s) Performed: Procedure(s) (LRB):  EGD (ESOPHAGOGASTRODUODENOSCOPY) (N/A)    Final Anesthesia Type: MAC      Patient location during evaluation: PACU  Patient participation: No - Unable to Participate, Intubation  Level of consciousness: awake  Post-procedure vital signs: reviewed and stable  Pain management: adequate  Airway patency: patent    PONV status at discharge: No PONV  Anesthetic complications: no      Cardiovascular status: hemodynamically stable  Respiratory status: unassisted  Hydration status: euvolemic  Follow-up not needed.          Vitals Value Taken Time   /59 09/01/22 1601   Temp 37.5 °C (99.5 °F) 09/01/22 1545   Pulse 101 09/01/22 1609   Resp 23 09/01/22 1609   SpO2 100 % 09/01/22 1609   Vitals shown include unvalidated device data.      Event Time   Out of Recovery 09/01/2022 15:32:31         Pain/Ray Score: Pain Rating Prior to Med Admin: 0 (9/1/2022  6:49 AM)  Pain Rating Post Med Admin: 0 (9/1/2022  7:44 AM)  Ray Score: 10 (9/1/2022  3:05 PM)

## 2022-09-01 NOTE — ASSESSMENT & PLAN NOTE
S/P IVFs and blood products  Currently SBP  and asymptomatic  No pressor indicated  Volume replacement as needed.

## 2022-09-01 NOTE — PLAN OF CARE
Peritoneal dialysis: 1.5% 6 hours    Uncomplicated/complicated: stable    Ultrafiltration volume: 1718cc     Notes: Disconnected per p/p, Tolerated, No issues reported.

## 2022-09-01 NOTE — SUBJECTIVE & OBJECTIVE
Interval History: Plans in place for EGD by GI this afternoon.  H&H stable.  Continue to transfuse as indicated.  CT abdomen pelvis performed yesterday afternoon did not show signs of active bleeding.    Review of Systems   Constitutional:  Negative for fatigue and fever.   HENT:  Negative for sinus pressure.    Eyes:  Negative for visual disturbance.   Respiratory:  Negative for shortness of breath.    Cardiovascular:  Negative for chest pain.   Gastrointestinal:  Negative for nausea and vomiting.   Genitourinary:  Negative for difficulty urinating.   Musculoskeletal:  Negative for back pain.   Skin:  Negative for rash.   Neurological:  Negative for headaches.   Psychiatric/Behavioral:  Negative for confusion.    Objective:     Vital Signs (Most Recent):  Temp: 99.8 °F (37.7 °C) (09/01/22 0715)  Pulse: 105 (09/01/22 1130)  Resp: (!) 31 (09/01/22 1130)  BP: (!) 101/57 (09/01/22 1130)  SpO2: 99 % (09/01/22 1130)   Vital Signs (24h Range):  Temp:  [97.9 °F (36.6 °C)-100.8 °F (38.2 °C)] 99.8 °F (37.7 °C)  Pulse:  [] 105  Resp:  [15-31] 31  SpO2:  [95 %-100 %] 99 %  BP: ()/(44-58) 101/57  Arterial Line BP: ()/(41-56) 105/46     Weight: 112.8 kg (248 lb 10.9 oz)  Body mass index is 33.73 kg/m².    Intake/Output Summary (Last 24 hours) at 9/1/2022 1156  Last data filed at 9/1/2022 1000  Gross per 24 hour   Intake 3709.4 ml   Output 2018 ml   Net 1691.4 ml      Physical Exam  Constitutional:       General: He is not in acute distress.     Appearance: He is well-developed. He is not diaphoretic.   HENT:      Head: Normocephalic and atraumatic.   Eyes:      Pupils: Pupils are equal, round, and reactive to light.   Cardiovascular:      Rate and Rhythm: Normal rate and regular rhythm.      Heart sounds: Normal heart sounds. No murmur heard.    No friction rub. No gallop.   Pulmonary:      Effort: Pulmonary effort is normal. No respiratory distress.      Breath sounds: Normal breath sounds. No stridor. No  wheezing or rales.   Abdominal:      General: Bowel sounds are normal. There is no distension.      Palpations: Abdomen is soft. There is no mass.      Tenderness: There is no abdominal tenderness. There is no guarding.   Musculoskeletal:      Right lower leg: No edema.      Left lower leg: No edema.   Skin:     General: Skin is warm.      Findings: No erythema.   Neurological:      Mental Status: He is alert and oriented to person, place, and time.       Significant Labs: All pertinent labs within the past 24 hours have been reviewed.    Significant Imaging: I have reviewed all pertinent imaging results/findings within the past 24 hours.

## 2022-09-01 NOTE — NURSING
Pt returned to unit from EGD. VSS, report received from GUTIERREZ Norton. Protonix and NS gtt restarted.

## 2022-09-01 NOTE — ASSESSMENT & PLAN NOTE
9/1:   H&H stable   Patient had drop in hemoglobin yesterday   CT abdomen pelvis did not reveal signs of active bleeding   GI plans for repeat EGD this afternoon

## 2022-09-01 NOTE — PROGRESS NOTES
[facility] - Intensive Care ([facility] Medicine  Progress Note    Patient Name: Alon Adamson  MRN: [id number]  Patient Class: IP- Inpatient   Admission Date: 8/29/2022  Length of Stay: 3 days  Attending Physician: Jef Senior MD  Primary Care Provider: Bruno Oconnor NP        Subjective:     Principal Problem:Bleeding gastric ulcer        HPI:  Mr. Adamson started throwing up blood at home Saturday morning.  It started with low abdominal pain then nausea.  The vomit was dark and grainy mixed with bright red blood.  Threw up multiple times with large amounts of bloody liquid.  He the same problem a few days ago and was admitted and endoscopy showed blood clots in his stomach.  He also got very weak and could not stand up.  His home blood pressure monitor showed as low as 47/30.  He did not lose consciousness or fall.  No chest pain or shortness of breath.  Dizzy on standing.  He received three units of blood at [facility] before transfer here.      Overview/Hospital Course:  8/30: s/p Gelfoam embolization performed of left gastric artery this am. Cont levophed and ppi. Wean levo as tolerated. Repeat h/h. Transfuse hgb < 8.   8/31: EGD performed yesterday. See GI report. H/H stable. No further active bleeding noted. Will extubate patient. Will need CTA GI bleed should pt bleed again.   9/1:  Plans in place for EGD by GI this afternoon.  H&H stable.  Continue to transfuse as indicated.  CT abdomen pelvis performed yesterday afternoon did not show signs of active bleeding.      Interval History: Plans in place for EGD by GI this afternoon.  H&H stable.  Continue to transfuse as indicated.  CT abdomen pelvis performed yesterday afternoon did not show signs of active bleeding.    Review of Systems   Constitutional:  Negative for fatigue and fever.   HENT:  Negative for sinus pressure.    Eyes:  Negative for visual disturbance.   Respiratory:  Negative for shortness of breath.    Cardiovascular:   Negative for chest pain.   Gastrointestinal:  Negative for nausea and vomiting.   Genitourinary:  Negative for difficulty urinating.   Musculoskeletal:  Negative for back pain.   Skin:  Negative for rash.   Neurological:  Negative for headaches.   Psychiatric/Behavioral:  Negative for confusion.    Objective:     Vital Signs (Most Recent):  Temp: 99.8 °F (37.7 °C) (09/01/22 0715)  Pulse: 105 (09/01/22 1130)  Resp: (!) 31 (09/01/22 1130)  BP: (!) 101/57 (09/01/22 1130)  SpO2: 99 % (09/01/22 1130)   Vital Signs (24h Range):  Temp:  [97.9 °F (36.6 °C)-100.8 °F (38.2 °C)] 99.8 °F (37.7 °C)  Pulse:  [] 105  Resp:  [15-31] 31  SpO2:  [95 %-100 %] 99 %  BP: ()/(44-58) 101/57  Arterial Line BP: ()/(41-56) 105/46     Weight: 112.8 kg (248 lb 10.9 oz)  Body mass index is 33.73 kg/m².    Intake/Output Summary (Last 24 hours) at 9/1/2022 1156  Last data filed at 9/1/2022 1000  Gross per 24 hour   Intake 3709.4 ml   Output 2018 ml   Net 1691.4 ml      Physical Exam  Constitutional:       General: He is not in acute distress.     Appearance: He is well-developed. He is not diaphoretic.   HENT:      Head: Normocephalic and atraumatic.   Eyes:      Pupils: Pupils are equal, round, and reactive to light.   Cardiovascular:      Rate and Rhythm: Normal rate and regular rhythm.      Heart sounds: Normal heart sounds. No murmur heard.    No friction rub. No gallop.   Pulmonary:      Effort: Pulmonary effort is normal. No respiratory distress.      Breath sounds: Normal breath sounds. No stridor. No wheezing or rales.   Abdominal:      General: Bowel sounds are normal. There is no distension.      Palpations: Abdomen is soft. There is no mass.      Tenderness: There is no abdominal tenderness. There is no guarding.   Musculoskeletal:      Right lower leg: No edema.      Left lower leg: No edema.   Skin:     General: Skin is warm.      Findings: No erythema.   Neurological:      Mental Status: He is alert and oriented to  person, place, and time.       Significant Labs: All pertinent labs within the past 24 hours have been reviewed.    Significant Imaging: I have reviewed all pertinent imaging results/findings within the past 24 hours.        Assessment/Plan:      * Bleeding gastric ulcer  9/1:   H&H stable   Patient had drop in hemoglobin yesterday   CT abdomen pelvis did not reveal signs of active bleeding   GI plans for repeat EGD this afternoon       Peritonitis associated with peritoneal dialysis  He was started on Intra-peritoneal Vancomycin on 26 August for a fluid culture growing S.hominis.  No signs or symptoms of infection.    JACK (obstructive sleep apnea)  Diagnosed but he does not use CPAP.    Shock circulatory  Off of Levophed   Continue monitor blood pressure    ESRD on peritoneal dialysis  Consultation to nephrology for ongoing renal replacement.  Plan to resume PD on 30 Aug.  No signs of volume overload and chemistries are within acceptable limits.  Follow serial renal function chemistries.  He does make urine.  Discussed with Dr. Mo.    8/30:   Nephrology consulted on case    Status post liver transplant  Check Tacrolimus level and continue 2 mg in the morning an 1 mg in the evening.  He is managed outpatient by Dr. García.  Checking PT/INR and LFTs.     8/30:  Continue tacrolimus    DM2 (diabetes mellitus, type 2)  Patient's FSGs are uncontrolled due to hyperglycemia on current medication regimen.  Last A1c reviewed-   Lab Results   Component Value Date    HGBA1C 5.6 08/23/2022     Most recent fingerstick glucose reviewed-   Recent Labs   Lab 08/29/22  2344 08/30/22  0709   POCTGLUCOSE 135* 264*     Current correctional scale  Low  Maintain anti-hyperglycemic dose as follows-   Antihyperglycemics (From admission, onward)    Start     Stop Route Frequency Ordered    08/29/22 2124  insulin aspart U-100 pen 0-5 Units         -- SubQ Every 6 hours PRN 08/29/22 2024        Hold Oral hypoglycemics while patient is in  the hospital.    HTN (hypertension)  Holding home medication for hypotension.  Resume when appropriate.    Coronary artery disease involving native coronary artery of native heart without angina pectoris  Holding Aspirin and Atorvastatin.  No chest pain or shortness of breath.  EKG as needed.      VTE Risk Mitigation (From admission, onward)         Ordered     Reason for No Pharmacological VTE Prophylaxis  Once        Question:  Reasons:  Answer:  Active Bleeding    08/29/22 1906     IP VTE HIGH RISK PATIENT  Once         08/29/22 1906     Place sequential compression device  Until discontinued         08/29/22 1906                Discharge Planning   MARIO:      Code Status: Full Code   Is the patient medically ready for discharge?:     Reason for patient still in hospital (select all that apply): Patient trending condition  Discharge Plan A: Home with family            Critical care time spent on the evaluation and treatment of severe organ dysfunction, review of pertinent labs and imaging studies, discussions with consulting providers and discussions with patient/family: 38 minutes.      Jef Senior MD  Department of Hospital Medicine   '[place] - Intensive Care ([facility])

## 2022-09-02 PROBLEM — R57.9 SHOCK CIRCULATORY: Status: RESOLVED | Noted: 2022-01-01 | Resolved: 2022-01-01

## 2022-09-02 NOTE — PROGRESS NOTES
[facility] ([facility])  Critical Care Medicine  Progress Note    Patient Name: Alon Adamson  MRN: [id number]  Admission Date: 8/29/2022  Hospital Length of Stay: 4 days  Code Status: Full Code  Attending Provider: Dexter Hale, *  Primary Care Provider: Bruno Oconnor NP   Principal Problem: Gastrointestinal hemorrhage associated with gastric ulcer    Subjective:     HPI:  67 year old male with a PMHx of EtOH cirrhosis s/p liver transplant 2018 (on tacrolimus), ESRD on peritoneal dialysis, JACK, CAD, HTN, and DM who presented to [facility] in [place], [place] the 8/29/22 morning with hematemesis x 3 days. Hypotensive on arrival and patient started on IVF, pantoprazole infusion, metronidazole, ciprofloxacin & received 4 U pRBC.  Hb has fallen since admission (9.1 > 7.0). Recently admitted to [facility] 8/23/22 for same complication; oozing gastric ulcer found on EGD, trated with bipolar cautery and hemostatic clip placement.     Accepted to [facility] for GI services. Upon arrival to ICU pt started on Protonix infusion but had recurrent large volume hematemesis episodes overnight; 2 additional units PRBCs, 1 unit PLT and FFP administered and pt brought to IR this AM for embolization of the left gastric artery.       Hospital/ICU Course:  8/30: Pt seen and examined once returned to ICU from cath lab. Currently sedated, on a Protonix drip. NGT currently clamped. VSS. Rt femoral sheath with no complications. Awaiting rpt labs.   8/31: Bedside EGD performed yesterday after rpt episodes of bright red hematemesis, no active bleeding found, washout performed. Hypotensive upon assessment, responsive to fluid bolus. No occurrences of bleeding overnight, H/H stable. Will extubate.     9/1 - Tolerating extubation.  PD dialysis overnight for 6 hours with 1.7 L removed.  Completed 12 th unit PRBC this AM.  No upper bleeding, only melena overnight.  Mild asymptomatic hypotension not  requiring pressor support.  Still with mod hematuria.  Upright in bed fully AA&OX3 in no distress with RA SAT 99%.   9/2 - Upright in bed fully awake and alert in no distress with VSS.  Had 2 green BMs overnight and no signs of bleeding with H/H holding.       Review of Systems   Constitutional:  Positive for malaise/fatigue. Negative for chills and fever.   HENT:  Negative for congestion.    Eyes:  Negative for blurred vision.   Respiratory:  Negative for cough, sputum production and shortness of breath.    Cardiovascular:  Positive for leg swelling. Negative for chest pain.   Gastrointestinal:  Negative for abdominal pain, melena, nausea and vomiting.   Genitourinary:  Negative for dysuria.   Musculoskeletal:  Negative for myalgias.   Skin:  Negative for rash.   Neurological:  Negative for dizziness, weakness and headaches.   Endo/Heme/Allergies:  Does not bruise/bleed easily.   Psychiatric/Behavioral:  The patient is not nervous/anxious.        Objective:     Vital Signs (Most Recent):  Temp: 100 °F (37.8 °C) (09/02/22 1101)  Pulse: 87 (09/02/22 1200)  Resp: (!) 22 (09/02/22 1200)  BP: (!) 104/58 (09/02/22 1200)  SpO2: 97 % (09/02/22 1200)   Vital Signs (24h Range):  Temp:  [97.7 °F (36.5 °C)-100 °F (37.8 °C)] 100 °F (37.8 °C)  Pulse:  [] 87  Resp:  [17-38] 22  SpO2:  [95 %-100 %] 97 %  BP: ()/(50-62) 104/58  Arterial Line BP: ()/(38-76) 84/54     Weight: 114.7 kg (252 lb 13.9 oz)  Body mass index is 34.29 kg/m².      Intake/Output Summary (Last 24 hours) at 9/2/2022 1209  Last data filed at 9/2/2022 1200  Gross per 24 hour   Intake 3293.57 ml   Output 413 ml   Net 2880.57 ml       Physical Exam  Vitals and nursing note reviewed.   Constitutional:       General: He is awake. He is not in acute distress.     Appearance: He is well-developed. He is obese. He is ill-appearing. He is not toxic-appearing or diaphoretic.      Interventions: He is not intubated.  HENT:      Head: Normocephalic and  atraumatic.      Mouth/Throat:      Mouth: Mucous membranes are moist.   Eyes:      General: Lids are normal.      Pupils: Pupils are equal, round, and reactive to light.   Neck:      Trachea: Trachea normal.   Cardiovascular:      Rate and Rhythm: Normal rate and regular rhythm.      Pulses:           Radial pulses are 1+ on the right side and 1+ on the left side.        Dorsalis pedis pulses are 1+ on the right side and 1+ on the left side.      Heart sounds: Murmur heard.   Systolic murmur is present.   Pulmonary:      Effort: Pulmonary effort is normal. No tachypnea, accessory muscle usage or respiratory distress. He is not intubated.      Breath sounds: Normal breath sounds.   Chest:      Chest wall: No deformity or tenderness.   Abdominal:      General: Bowel sounds are increased. There is no distension.      Palpations: Abdomen is soft.      Tenderness: There is no abdominal tenderness.          Comments: Obese   Genitourinary:     Penis: Normal.    Musculoskeletal:         General: Normal range of motion.      Cervical back: Normal range of motion.      Right lower le+ Pitting Edema present.      Left lower le+ Pitting Edema present.      Right foot: No deformity.      Left foot: No deformity.   Lymphadenopathy:      Cervical: No cervical adenopathy.   Skin:     General: Skin is warm and dry.      Capillary Refill: Capillary refill takes less than 2 seconds.      Findings: No rash.          Neurological:      General: No focal deficit present.      Mental Status: He is alert and oriented to person, place, and time.   Psychiatric:         Attention and Perception: Attention normal.         Mood and Affect: Mood normal.         Speech: Speech normal.         Behavior: Behavior normal. Behavior is cooperative.         Thought Content: Thought content normal.         Cognition and Memory: Cognition normal.         Judgment: Judgment normal.       Vents:  Vent Mode: A/C (22 0516)  Ventilator  Initiated: Yes (08/30/22 2041)  Set Rate: 20 BPM (08/31/22 0516)  Vt Set: 400 mL (08/31/22 0516)  Pressure Support: 0 cmH20 (08/31/22 0516)  PEEP/CPAP: 5 cmH20 (08/31/22 0516)  Oxygen Concentration (%): 21 (08/31/22 0912)  Peak Airway Pressure: 18 cmH2O (08/31/22 0516)  Plateau Pressure: 13 cmH20 (08/31/22 0516)  Total Ve: 8.16 mL (08/31/22 0516)  F/VT Ratio<105 (RSBI): (!) 49.14 (08/31/22 0516)    Lines/Drains/Airways       Central Venous Catheter Line  Duration                  Hemodialysis Catheter -- days              Peripheral Intravenous Line  Duration                  Peripheral IV - Single Lumen 08/30/22 0052 20 G Anterior;Left;Proximal Forearm 3 days         Peripheral IV - Single Lumen 08/30/22 0714 20 G Anterior;Right Upper Arm 3 days                    Significant Labs:    CBC/Anemia Profile:  Recent Labs   Lab 09/01/22  2252 09/02/22  0642 09/02/22  0949   WBC 4.88 3.90 4.04   HGB 8.2* 7.8* 7.6*   HCT 24.7* 24.4* 24.1*   PLT 86* 93* 89*   MCV 92 94 96   RDW 16.4* 17.0* 17.0*        Chemistries:  Recent Labs   Lab 09/01/22  0433 09/02/22  0153    140   K 3.7 4.0    114*   CO2 19* 16*   * 100*   CREATININE 5.6* 6.2*   CALCIUM 7.2* 7.2*   ALBUMIN 1.6* 1.5*   PROT 3.7* 3.8*   BILITOT 0.3 0.3   ALKPHOS 39* 41*   ALT 5* 6*   AST 10 17   MG 2.2 2.1   PHOS 3.5 3.6       POCT Glucose:   Recent Labs   Lab 09/02/22  0539 09/02/22  0819 09/02/22  1210   POCTGLUCOSE 106 106 125*     All pertinent labs within the past 24 hours have been reviewed.        ABG  Recent Labs   Lab 08/31/22  0431   PH 7.371   PO2 200*   PCO2 38.1   HCO3 22.1*   BE -3     Assessment/Plan:     Cardiac/Vascular  HTN (hypertension)  - Monitor    Shock circulatory  S/P IVFs and blood products  Last 24 hours SBP  and asymptomatic  No pressor indicated  Volume replacement as needed.     Coronary artery disease involving native coronary artery of native heart without angina pectoris  - Hold ASA in setting of active  bleed  - Cont cardiac monitoring    Renal/  ESRD on peritoneal dialysis  - Nephrology following  - ESRD on PD since 01/2022, appears at baseline    ID  Peritonitis associated with peritoneal dialysis  Normal WBC  tmax 100.8  No cultures obtained  Intra-paratoneal Vanc stopped  Follow fever curve      Endocrine  DM2 (diabetes mellitus, type 2)  Clear liquid diet  diabetic diet when appropriate  Cont Mod SSI    GI  * Gastrointestinal hemorrhage associated with gastric ulcer  S/P Gelfoam embolization per IR of left gastric artery 8/30  S/P EGD 8/30 with No active bleeding noted but large amount of old blood along with old and new clot material found in gastric fundus in area of previously noted gastric ulceration. Cleaned out clot and old blood for > 1 hour with > 1200 cc removed. However, there was still a large amount of residual blood and clot remaining with poor visibility of any underlying lesion. Therefore decision made to stop procedure at this time.   S/P EGD 9/1: No old or fresh blood noted. No active bleeding. Exposed vessel noted in fundus of stomach with prior 3 clips in place. There was area of vessel that was exposed and not clipped off. Additional clip placed to this area. Vessel was then sprayed with water jet for a few seconds and no bleeding noted.     S/P multiple PRBC transfusions  On PPI infusion another 24 hours  Start clear liquids    Status post liver transplant  - ETOH abuse related  - Per GI mgmt of meds, currently on tacrolimus    Other  JACK (obstructive sleep apnea)  No hx of CPAP utilization  RA %  patient refuses CPAP        Preventive Measures and Monitoring:   Stress Ulcer: PPI infusion  Nutrition: clear liquids  Glucose control: SSI  Bowel prophylaxis: having diarrhea  DVT prophylaxis: SCDs  Hx CAD on B-Blocker: none due to GI bleed and hypotension  Head of Bed/Reposition: Elevate HOB and turn Q1-2 hours   Early Mobility: OOB  Code Status: Full     Counseling/Consultation:I  have discussed the care of this patient in detail with the bedside nursing staff and Dr. Kellogg and Dr. Moran    Will Transfer to [facility] and sign off, please re-consult if needed.      Critical Care Time: 50 minutes  Critical secondary to Patient has a condition that poses threat to life and bodily function: ABLA due to acute gastric ulcer hemorrhage with hypotension      Critical care was time spent personally by me on the following activities: development of treatment plan with patient or surrogate and bedside caregivers, discussions with consultants, evaluation of patient's response to treatment, examination of patient, ordering and performing treatments and interventions, ordering and review of laboratory studies, ordering and review of radiographic studies, pulse oximetry, re-evaluation of patient's condition. This critical care time did not overlap with that of any other provider or involve time for any procedures.     Elan Yancey NP  Critical Care Medicine  [facility] ([facility])

## 2022-09-02 NOTE — NURSING
09/02/22 0800   Cycler Peritoneal Dialysis   Initial Drain Volume (mL) 165 mL   Effluent Appearance Clear   Number of Cycles 3   Fill Volume (mL) 2000 mL   Total Volume (mL) 6000 mL   Cycler PD Total UF (mL) 313 mL   Cycler Treatment Comments CCPD treatment completed. No issues reported overnight. No complaints

## 2022-09-02 NOTE — ASSESSMENT & PLAN NOTE
9/1:   H&H stable   Patient had drop in hemoglobin yesterday   CT abdomen pelvis did not reveal signs of active bleeding   GI plans for repeat EGD this afternoon     -S/p EGD :No old or fresh blood noted. No active bleeding. Exposed vessel noted in fundus of stomach with prior 3 clips in place. There was area of vessel that was exposed and not clipped off. Additional clip placed to this area. Vessel was then sprayed with water jet for a few seconds and no bleeding noted

## 2022-09-02 NOTE — ASSESSMENT & PLAN NOTE
S/P Gelfoam embolization per IR of left gastric artery 8/30  S/P EGD 8/30 with No active bleeding noted but large amount of old blood along with old and new clot material found in gastric fundus in area of previously noted gastric ulceration. Cleaned out clot and old blood for > 1 hour with > 1200 cc removed. However, there was still a large amount of residual blood and clot remaining with poor visibility of any underlying lesion. Therefore decision made to stop procedure at this time.   S/P EGD 9/1: No old or fresh blood noted. No active bleeding. Exposed vessel noted in fundus of stomach with prior 3 clips in place. There was area of vessel that was exposed and not clipped off. Additional clip placed to this area. Vessel was then sprayed with water jet for a few seconds and no bleeding noted.     S/P multiple PRBC transfusions  On PPI infusion another 24 hours  Start clear liquids

## 2022-09-02 NOTE — ASSESSMENT & PLAN NOTE
S/P IVFs and blood products  Last 24 hours SBP  and asymptomatic  No pressor indicated  Volume replacement as needed.

## 2022-09-02 NOTE — SUBJECTIVE & OBJECTIVE
Interval History:     Review of Systems   Constitutional:  Negative for fatigue and fever.   HENT:  Negative for sinus pressure.    Eyes:  Negative for visual disturbance.   Respiratory:  Negative for shortness of breath.    Cardiovascular:  Negative for chest pain.   Gastrointestinal:  Negative for nausea and vomiting.   Endocrine: Negative.    Genitourinary:  Negative for difficulty urinating.   Musculoskeletal:  Negative for back pain.   Skin:  Negative for rash.   Allergic/Immunologic: Negative.    Neurological:  Negative for headaches.   Psychiatric/Behavioral:  Negative for confusion.    Objective:     Vital Signs (Most Recent):  Temp: 100 °F (37.8 °C) (09/02/22 1101)  Pulse: 95 (09/02/22 1101)  Resp: (!) 27 (09/02/22 1101)  BP: (!) 103/53 (09/02/22 1101)  SpO2: 98 % (09/02/22 1101)   Vital Signs (24h Range):  Temp:  [97.7 °F (36.5 °C)-100 °F (37.8 °C)] 100 °F (37.8 °C)  Pulse:  [] 95  Resp:  [17-38] 27  SpO2:  [95 %-100 %] 98 %  BP: ()/(50-62) 103/53  Arterial Line BP: ()/(38-76) 84/54     Weight: 114.7 kg (252 lb 13.9 oz)  Body mass index is 34.29 kg/m².    Intake/Output Summary (Last 24 hours) at 9/2/2022 1157  Last data filed at 9/2/2022 1100  Gross per 24 hour   Intake 3293.53 ml   Output 413 ml   Net 2880.53 ml      Physical Exam  Constitutional:       General: He is not in acute distress.     Appearance: He is well-developed. He is not diaphoretic.   HENT:      Head: Normocephalic and atraumatic.   Eyes:      Pupils: Pupils are equal, round, and reactive to light.   Cardiovascular:      Rate and Rhythm: Normal rate and regular rhythm.      Heart sounds: Normal heart sounds. No murmur heard.    No friction rub. No gallop.   Pulmonary:      Effort: Pulmonary effort is normal. No respiratory distress.      Breath sounds: Normal breath sounds. No stridor. No wheezing or rales.   Abdominal:      General: Bowel sounds are normal. There is no distension.      Palpations: Abdomen is soft.  There is no mass.      Tenderness: There is no abdominal tenderness. There is no guarding.   Musculoskeletal:      Right lower leg: No edema.      Left lower leg: No edema.   Skin:     General: Skin is warm.      Findings: No erythema.   Neurological:      Mental Status: He is alert and oriented to person, place, and time.       Significant Labs: All pertinent labs within the past 24 hours have been reviewed.      Significant Imaging: I have reviewed all pertinent imaging results/findings within the past 24 hours.

## 2022-09-02 NOTE — PROGRESS NOTES
[facility] - [facility] ([facility])  Nephrology  Progress Note    Patient Name: Alon Adamson  MRN: [id number]  Admission Date: 8/29/2022  Hospital Length of Stay: 4 days  Attending Provider: Dexter Hale, *   Primary Care Physician: Bruno Oconnor NP  Principal Problem:Gastrointestinal hemorrhage associated with gastric ulcer    Inpatient consult to Nephrology  Consult performed by: Moncho Lopez MD  Consult ordered by: Gilberto Linn MD  Reason for consult: End-stage renal disease      Subjective:     Interval History:  Pt was seen in the intensive care unit.  In bed resting comfortably.  No acute distress noted.    Review of systems:  No shortness of breath or chest discomfort.  No fevers or chills no swelling no nausea vomiting or diarrhea reported.    Review of patient's allergies indicates:   Allergen Reactions    Nsaids (non-steroidal anti-inflammatory drug)      D/t to liver disease.  Other reaction(s): Unknown    Penicillins Other (See Comments)     Tolerated pip-tazo in 8/2016 without issue  Tolerated cefepime 7/2018 without issue  Since childhood was told not to take it  Other reaction(s): Unknown     Current Facility-Administered Medications   Medication Frequency    0.9%  NaCl infusion (for blood administration) Q24H PRN    0.9%  NaCl infusion Continuous    aluminum-magnesium hydroxide-simethicone 200-200-20 mg/5 mL suspension 30 mL QID PRN    dextrose 10% bolus 125 mL PRN    glucagon (human recombinant) injection 1 mg PRN    glucose chewable tablet 16 g PRN    glucose chewable tablet 24 g PRN    insulin aspart U-100 pen 1-10 Units Q6H PRN    levothyroxine tablet 50 mcg Daily    melatonin tablet 6 mg Nightly PRN    metoclopramide HCl injection 5 mg Q6H    mupirocin 2 % ointment BID    naloxone 0.4 mg/mL injection 0.02 mg PRN    ondansetron disintegrating tablet 8 mg Q8H PRN    ondansetron injection 4 mg Q6H PRN    pantoprazole 40 mg in  mL infusion (ready to mix system)  Continuous    promethazine (PHENERGAN) 12.5 mg in dextrose 5 % 50 mL IVPB Q6H PRN    simethicone chewable tablet 80 mg QID PRN    sodium chloride 0.9% flush 10 mL Q8H PRN    tacrolimus capsule 1 mg Daily PM    tacrolimus capsule 2 mg Daily AM       Objective:     Vital Signs (Most Recent):  Temp: 99.1 °F (37.3 °C) (09/02/22 0701)  Pulse: 91 (09/02/22 0800)  Resp: (!) 21 (09/02/22 0800)  BP: (!) 103/51 (09/02/22 0800)  SpO2: 99 % (09/02/22 0800)  O2 Device (Oxygen Therapy): room air (09/02/22 0701)   Vital Signs (24h Range):  Temp:  [97.7 °F (36.5 °C)-99.6 °F (37.6 °C)] 99.1 °F (37.3 °C)  Pulse:  [] 91  Resp:  [17-38] 21  SpO2:  [95 %-100 %] 99 %  BP: ()/(50-62) 103/51  Arterial Line BP: ()/(38-76) 84/54     Weight: 114.7 kg (252 lb 13.9 oz) (09/02/22 0500)  Body mass index is 34.29 kg/m².  Body surface area is 2.41 meters squared.    I/O last 3 completed shifts:  In: 4445.7 [I.V.:3347; Blood:625; IV Piggyback:473.7]  Out: 2118 [Urine:400; Other:1718]    Physical Exam  Constitutional:       General: He is not in acute distress.     Appearance: Normal appearance.   HENT:      Head: Normocephalic and atraumatic.   Eyes:      General: No scleral icterus.     Extraocular Movements: Extraocular movements intact.      Pupils: Pupils are equal, round, and reactive to light.   Cardiovascular:      Rate and Rhythm: Normal rate and regular rhythm.   Pulmonary:      Effort: Pulmonary effort is normal.      Breath sounds: Normal breath sounds.   Musculoskeletal:      Right lower leg: No edema.      Left lower leg: No edema.   Skin:     General: Skin is warm and dry.   Neurological:      General: No focal deficit present.      Mental Status: He is oriented to person, place, and time.   Psychiatric:         Mood and Affect: Mood normal.         Behavior: Behavior normal.       Significant Labs:sureBMP:   Recent Labs   Lab 09/02/22  0153   *      K 4.0   *   CO2 16*   *   CREATININE 6.2*    CALCIUM 7.2*   MG 2.1     CMP:   Recent Labs   Lab 09/02/22  0153   *   CALCIUM 7.2*   ALBUMIN 1.5*   PROT 3.8*      K 4.0   CO2 16*   *   *   CREATININE 6.2*   ALKPHOS 41*   ALT 6*   AST 17   BILITOT 0.3     All labs within the past 24 hours have been reviewed.    Significant Imaging:  Reviewed      Assessment/Plan:     Active Diagnoses:    Diagnosis Date Noted POA    PRINCIPAL PROBLEM:  Gastrointestinal hemorrhage associated with gastric ulcer [K25.4] 08/30/2022 Yes    Shock circulatory [R57.9] 08/29/2022 Yes    JACK (obstructive sleep apnea) [G47.33] 08/29/2022 Yes     Chronic    Peritonitis associated with peritoneal dialysis [T85.71XA] 08/29/2022 Yes    ESRD on peritoneal dialysis [N18.6, Z99.2] 08/23/2022 Not Applicable     Chronic    Status post liver transplant [Z94.4] 04/14/2021 Not Applicable     Chronic    HTN (hypertension) [I10] 03/03/2021 Yes    DM2 (diabetes mellitus, type 2) [E11.9] 03/03/2021 Yes     Chronic    Coronary artery disease involving native coronary artery of native heart without angina pectoris [I25.10] 03/16/2017 Yes     Chronic      Problems Resolved During this Admission:    Diagnosis Date Noted Date Resolved POA    Hematemesis [K92.0] 08/29/2022 09/01/2022 Yes       Assessment plan:    1. End-stage renal disease:  Underwent peritoneal dialysis last night.  Dialysis notes reviewed.  No difficulties with the machine.  Will continue current prescription.    2. Metabolic acidosis:  Serum bicarbonate is low at 16.  Will continue to monitor.  He may need oral replacement.    3. Potassium is stable 4.0.    Thank you for your consult.     Moncho Lopez MD  Nephrology  [facility] ([facility])

## 2022-09-02 NOTE — SUBJECTIVE & OBJECTIVE
Subjective:     Interval History: s/p EGD yesterday with an additional clip placed. No n/v this morning. Had a bloody stool overnight. Hgb stable.     Review of Systems   Constitutional:         See Interval History for daily ROS.    Objective:     Vital Signs (Most Recent):  Temp: 100 °F (37.8 °C) (09/02/22 1101)  Pulse: 95 (09/02/22 1101)  Resp: (!) 27 (09/02/22 1101)  BP: (!) 103/53 (09/02/22 1101)  SpO2: 98 % (09/02/22 1101)   Vital Signs (24h Range):  Temp:  [97.7 °F (36.5 °C)-100 °F (37.8 °C)] 100 °F (37.8 °C)  Pulse:  [] 95  Resp:  [17-38] 27  SpO2:  [95 %-100 %] 98 %  BP: ()/(50-62) 103/53  Arterial Line BP: ()/(38-76) 84/54     Weight: 114.7 kg (252 lb 13.9 oz) (09/02/22 1041)  Body mass index is 34.29 kg/m².      Intake/Output Summary (Last 24 hours) at 9/2/2022 1142  Last data filed at 9/2/2022 1100  Gross per 24 hour   Intake 3293.53 ml   Output 413 ml   Net 2880.53 ml       Lines/Drains/Airways       Central Venous Catheter Line  Duration                  Hemodialysis Catheter -- days              Peripheral Intravenous Line  Duration                  Peripheral IV - Single Lumen 08/30/22 0052 20 G Anterior;Left;Proximal Forearm 3 days         Peripheral IV - Single Lumen 08/30/22 0714 20 G Anterior;Right Upper Arm 3 days                    Physical Exam  Constitutional:       General: He is not in acute distress.     Appearance: He is well-developed. He is not diaphoretic.   HENT:      Head: Normocephalic and atraumatic.   Eyes:      Extraocular Movements: Extraocular movements intact.   Cardiovascular:      Rate and Rhythm: Normal rate and regular rhythm.   Pulmonary:      Effort: Pulmonary effort is normal. No respiratory distress.      Breath sounds: Normal breath sounds. No wheezing.   Abdominal:      General: Bowel sounds are normal. There is no distension.      Palpations: Abdomen is soft.      Tenderness: There is no abdominal tenderness.   Neurological:      Mental Status:  He is alert and oriented to person, place, and time.      Cranial Nerves: No cranial nerve deficit.   Psychiatric:         Behavior: Behavior normal.       Significant Labs:  CBC:   Recent Labs   Lab 09/01/22  2252 09/02/22  0642 09/02/22  0949   WBC 4.88 3.90 4.04   HGB 8.2* 7.8* 7.6*   HCT 24.7* 24.4* 24.1*   PLT 86* 93* 89*         Significant Imaging:  Imaging results within the past 24 hours have been reviewed.

## 2022-09-02 NOTE — NURSING
Phone report called to GUTIERREZ Miramontes on [facility]. Pt transferred to [facility] room 566 with all pt belongings and paper chart. NAD noted. VSS prior to transfer.

## 2022-09-02 NOTE — CONSULTS
[facility] - [facility] (Hospital)  Adult Nutrition  Follow up  Note    SUMMARY     Recommendations   1) Advance PO diet when medically able to goal of renal, DM 2000 kcal ( nothing acidic/spicy/peppery)   - if pt needs liquids only full liquid diet + boost glucose control with meal appropriate     2) If unable to advance PO diet to full liquids in < 4 days consider nutrition support PPN D 5 AA 4.25 @ 75 ml/hr + standard lipids   ( 1112 kcal, 76 g protein)     3) weigh weekly, antacid medications per GI discretion    Goals: 1) PO diet advanced in < 4 days or nutrition support started  Nutrition Goal Status: ongoing   Communication of RD Recs:  (POC, sticky note)    Assessment and Plan    Inadequate energy/protein intakes  R/t NPO, altered GI function  As evidenced by PO intakes < 50% of needs x 2-3 days  Intervention: collaboration with other providers    Ongoing     Malnutrition Assessment     Skin (Micronutrient):  (Markus = 15, buttocks redness and skin tear)  Teeth (Micronutrient):  (missing some)   Micronutrient Evaluation: suspected deficiency  Micronutrient Evaluation Comments: iron               Edema (Fluid Accumulation):  (1-2+)             Reason for Assessment    Reason For Assessment: Follow up   Diagnosis:  (hematemesis)  Relevant Medical History: kidney transplant 2018, DM2, ESRD on PD, HTN  Interdisciplinary Rounds: did not attend (remote)    General Information Comments: 68 y/o male admitted s/p hematemesis PTA, bleeding gastric ulcer. NPO x 2 days, had EGD yesterday and just extubated today. NFPE to be done by on-site RD at f/u. Insignificant wt loss per chart review. Buttocks redness/skin tear noted.    9/2: Pt has been advanced to clear liquid diet, +BM x2 dark green and liquid, 9/1. Labs reviews    Nutrition Discharge Planning: To be determined- Renal, DM 2000 kcal diet ( nothing spicy/acidic/peppery)    Nutrition Risk Screen    Nutrition Risk Screen: no indicators present    Nutrition/Diet  History    Spiritual, Cultural Beliefs, Zoroastrianism Practices, Values that Affect Care: no  Food Allergies: NKFA  Factors Affecting Nutritional Intake: nausea/vomiting, abdominal pain, NPO, on mechanical ventilation    Anthropometrics    Temp: 100 °F (37.8 °C)  Height Method: Measured  Height: 6' (182.9 cm)  Height (inches): 72 in  Weight Method: Bed Scale  Weight: 114.7 kg (252 lb 13.9 oz)  Weight (lb): 252.87 lb  Ideal Body Weight (IBW), Male: 178 lb  % Ideal Body Weight, Male (lb): 142.06 %  BMI (Calculated): 34.3  BMI Grade: 30 - 34.9- obesity - grade I  Usual Body Weight (UBW), k.6 kg  % Usual Body Weight: 100.3  % Weight Change From Usual Weight: 0.09 %       Lab/Procedures/Meds    Pertinent Labs Reviewed: reviewed  BMP  Lab Results   Component Value Date     2022    K 4.0 2022     (H) 2022    CO2 16 (L) 2022     (H) 2022    CREATININE 6.2 (H) 2022    CALCIUM 7.2 (L) 2022    ANIONGAP 10 2022    ESTGFRAFRICA 16.1 (A) 2021    EGFRNONAA 14.0 (A) 2021     Recent Labs   Lab 22  0819   POCTGLUCOSE 106     Lab Results   Component Value Date    HGBA1C 5.6 2022     Lab Results   Component Value Date    CALCIUM 7.2 (L) 2022    PHOS 3.6 2022       Pertinent Medications Reviewed: reviewed  Pertinent Medications Comments: polyethylene glycol, NS @ 150 ml/hr, insulin, zofran, phenergan    Estimated/Assessed Needs    Weight Used For Calorie Calculations: 77 kg (169 lb 12.1 oz)  Energy Calorie Requirements (kcal): 25-30 kcal/kg = 4111-5246  Energy Need Method: Kcal/kg  Protein Requirements: 1.2-1.3 g protein/kg  Weight Used For Protein Calculations: 77 kg (169 lb 12.1 oz)  Fluid Requirements (mL): per MD  Estimated Fluid Requirement Method: RDA Method  CHO Requirement: 216-264      Nutrition Prescription Ordered    Current Diet Order: Clear Liquid    Evaluation of Received Nutrient/Fluid Intake    % Kcal Needs: > 25%  %  Protein Needs: >25%  Energy Calories Required: not meeting needs  Protein Required: not meeting needs  Fluid Required: exceeds needs  Total Fluid Intake (mL/kg): IVF @ 150 ml/hr  Tolerance: tolerating  % Intake of Estimated Energy Needs: > 25%  % Meal Intake: 50%     Nutrition Risk    Level of Risk/Frequency of Follow-up: moderate     Monitor and Evaluation    Food and Nutrient Intake: energy intake, food and beverage intake  Food and Nutrient Adminstration: diet order, enteral and parenteral nutrition administration  Knowledge/Beliefs/Attitudes: food and nutrition knowledge/skill  Physical Activity and Function: nutrition-related ADLs and IADLs  Anthropometric Measurements: height/length, weight, weight change, body mass index  Biochemical Data, Medical Tests and Procedures: electrolyte and renal panel, gastrointestinal profile, glucose/endocrine profile, lipid profile  Nutrition-Focused Physical Findings: overall appearance       Nutrition Follow-Up  RD Follow-up?: Yes

## 2022-09-02 NOTE — ASSESSMENT & PLAN NOTE
S/p embolization by IR (08/30/22). Last EGD with additional clip placed (09/01/22).  He received a unit of blood yesterday morning. Hgb 7.6 today. Continue to monitor and transfuse additional units as needed.   Continue Protonix drip.   Continue clear liquid diet today if tolerating.   Monitor H/H and stool output. Transfuse additional units as needed.

## 2022-09-02 NOTE — ASSESSMENT & PLAN NOTE
No signs of active bleeding currently. H&H stable today.    Diet as tolerated  If were to rebleed would recommend transferring to a facility with liver transplant surgery available should the patient need surgical intervention, however if patient became unstable secondary to bleeding would be available for exploration should this be needed emergently.  Will sign off call with questions or concerns

## 2022-09-02 NOTE — PLAN OF CARE
Pt AAOx4. VSS. Denies pain. H&H stable. No signs of bleeding noted. 2 dark green liquid Bms this shift. Voids per urinal, very minimal amount. PD infusing over night. Bed low and locked. Call light within reach. Bed alarm on. Will continue to monitor.

## 2022-09-02 NOTE — SUBJECTIVE & OBJECTIVE
Review of Systems   Constitutional:  Positive for malaise/fatigue. Negative for chills and fever.   HENT:  Negative for congestion.    Eyes:  Negative for blurred vision.   Respiratory:  Negative for cough, sputum production and shortness of breath.    Cardiovascular:  Positive for leg swelling. Negative for chest pain.   Gastrointestinal:  Negative for abdominal pain, melena, nausea and vomiting.   Genitourinary:  Negative for dysuria.   Musculoskeletal:  Negative for myalgias.   Skin:  Negative for rash.   Neurological:  Negative for dizziness, weakness and headaches.   Endo/Heme/Allergies:  Does not bruise/bleed easily.   Psychiatric/Behavioral:  The patient is not nervous/anxious.        Objective:     Vital Signs (Most Recent):  Temp: 100 °F (37.8 °C) (09/02/22 1101)  Pulse: 87 (09/02/22 1200)  Resp: (!) 22 (09/02/22 1200)  BP: (!) 104/58 (09/02/22 1200)  SpO2: 97 % (09/02/22 1200)   Vital Signs (24h Range):  Temp:  [97.7 °F (36.5 °C)-100 °F (37.8 °C)] 100 °F (37.8 °C)  Pulse:  [] 87  Resp:  [17-38] 22  SpO2:  [95 %-100 %] 97 %  BP: ()/(50-62) 104/58  Arterial Line BP: ()/(38-76) 84/54     Weight: 114.7 kg (252 lb 13.9 oz)  Body mass index is 34.29 kg/m².      Intake/Output Summary (Last 24 hours) at 9/2/2022 1209  Last data filed at 9/2/2022 1200  Gross per 24 hour   Intake 3293.57 ml   Output 413 ml   Net 2880.57 ml       Physical Exam  Vitals and nursing note reviewed.   Constitutional:       General: He is awake. He is not in acute distress.     Appearance: He is well-developed. He is obese. He is ill-appearing. He is not toxic-appearing or diaphoretic.      Interventions: He is not intubated.  HENT:      Head: Normocephalic and atraumatic.      Mouth/Throat:      Mouth: Mucous membranes are moist.   Eyes:      General: Lids are normal.      Pupils: Pupils are equal, round, and reactive to light.   Neck:      Trachea: Trachea normal.   Cardiovascular:      Rate and Rhythm: Normal rate and  regular rhythm.      Pulses:           Radial pulses are 1+ on the right side and 1+ on the left side.        Dorsalis pedis pulses are 1+ on the right side and 1+ on the left side.      Heart sounds: Murmur heard.   Systolic murmur is present.   Pulmonary:      Effort: Pulmonary effort is normal. No tachypnea, accessory muscle usage or respiratory distress. He is not intubated.      Breath sounds: Normal breath sounds.   Chest:      Chest wall: No deformity or tenderness.   Abdominal:      General: Bowel sounds are increased. There is no distension.      Palpations: Abdomen is soft.      Tenderness: There is no abdominal tenderness.          Comments: Obese   Genitourinary:     Penis: Normal.    Musculoskeletal:         General: Normal range of motion.      Cervical back: Normal range of motion.      Right lower le+ Pitting Edema present.      Left lower le+ Pitting Edema present.      Right foot: No deformity.      Left foot: No deformity.   Lymphadenopathy:      Cervical: No cervical adenopathy.   Skin:     General: Skin is warm and dry.      Capillary Refill: Capillary refill takes less than 2 seconds.      Findings: No rash.          Neurological:      General: No focal deficit present.      Mental Status: He is alert and oriented to person, place, and time.   Psychiatric:         Attention and Perception: Attention normal.         Mood and Affect: Mood normal.         Speech: Speech normal.         Behavior: Behavior normal. Behavior is cooperative.         Thought Content: Thought content normal.         Cognition and Memory: Cognition normal.         Judgment: Judgment normal.       Vents:  Vent Mode: A/C (22)  Ventilator Initiated: Yes (22)  Set Rate: 20 BPM (22)  Vt Set: 400 mL (22)  Pressure Support: 0 cmH20 (22)  PEEP/CPAP: 5 cmH20 (22)  Oxygen Concentration (%): 21 (22)  Peak Airway Pressure: 18 cmH2O (22  0516)  Plateau Pressure: 13 cmH20 (08/31/22 0516)  Total Ve: 8.16 mL (08/31/22 0516)  F/VT Ratio<105 (RSBI): (!) 49.14 (08/31/22 0516)    Lines/Drains/Airways       Central Venous Catheter Line  Duration                  Hemodialysis Catheter -- days              Peripheral Intravenous Line  Duration                  Peripheral IV - Single Lumen 08/30/22 0052 20 G Anterior;Left;Proximal Forearm 3 days         Peripheral IV - Single Lumen 08/30/22 0714 20 G Anterior;Right Upper Arm 3 days                    Significant Labs:    CBC/Anemia Profile:  Recent Labs   Lab 09/01/22  2252 09/02/22  0642 09/02/22  0949   WBC 4.88 3.90 4.04   HGB 8.2* 7.8* 7.6*   HCT 24.7* 24.4* 24.1*   PLT 86* 93* 89*   MCV 92 94 96   RDW 16.4* 17.0* 17.0*        Chemistries:  Recent Labs   Lab 09/01/22  0433 09/02/22  0153    140   K 3.7 4.0    114*   CO2 19* 16*   * 100*   CREATININE 5.6* 6.2*   CALCIUM 7.2* 7.2*   ALBUMIN 1.6* 1.5*   PROT 3.7* 3.8*   BILITOT 0.3 0.3   ALKPHOS 39* 41*   ALT 5* 6*   AST 10 17   MG 2.2 2.1   PHOS 3.5 3.6       POCT Glucose:   Recent Labs   Lab 09/02/22  0539 09/02/22  0819 09/02/22  1210   POCTGLUCOSE 106 106 125*     All pertinent labs within the past 24 hours have been reviewed.

## 2022-09-02 NOTE — SUBJECTIVE & OBJECTIVE
Interval History: patient reports stools improving, h/h stable today on clears    Medications:  Continuous Infusions:   pantoprazole (PROTONIX) IV infusion 8 mg/hr (09/02/22 1737)     Scheduled Meds:   levothyroxine  50 mcg Oral Daily    mupirocin   Nasal BID    sodium bicarbonate  650 mg Oral BID    sucralfate  1 g Oral Q6H    tacrolimus  1 mg Oral Daily PM    tacrolimus  2 mg Oral Daily AM     PRN Meds:aluminum-magnesium hydroxide-simethicone, dextrose 10%, glucagon (human recombinant), glucose, glucose, insulin aspart U-100, melatonin, naloxone, ondansetron, ondansetron, promethazine (PHENERGAN) IVPB, simethicone, sodium chloride 0.9%     Review of patient's allergies indicates:   Allergen Reactions    Nsaids (non-steroidal anti-inflammatory drug)      D/t to liver disease.  Other reaction(s): Unknown    Penicillins Other (See Comments)     Tolerated pip-tazo in 8/2016 without issue  Tolerated cefepime 7/2018 without issue  Since childhood was told not to take it  Other reaction(s): Unknown     Objective:     Vital Signs (Most Recent):  Temp: 98.8 °F (37.1 °C) (09/02/22 1501)  Pulse: 77 (09/02/22 1700)  Resp: 20 (09/02/22 1700)  BP: (!) 105/56 (09/02/22 1700)  SpO2: 98 % (09/02/22 1700)   Vital Signs (24h Range):  Temp:  [98.8 °F (37.1 °C)-100 °F (37.8 °C)] 98.8 °F (37.1 °C)  Pulse:  [] 77  Resp:  [20-34] 20  SpO2:  [96 %-100 %] 98 %  BP: ()/(50-59) 105/56     Weight: 114.7 kg (252 lb 13.9 oz)  Body mass index is 34.29 kg/m².    Intake/Output - Last 3 Shifts         08/31 0700  09/01 0659 09/01 0700  09/02 0659 09/02 0700  09/03 0659    P.O.   120    I.V. (mL/kg) 2350.3 (20.8) 2647.6 (23.1) 1291.2 (11.3)    Blood 1125      IV Piggyback 983 473.7     Total Intake(mL/kg) 4458.3 (39.5) 3121.3 (27.2) 1411.2 (12.3)    Urine (mL/kg/hr) 355 (0.1) 100 (0) 0 (0)    Emesis/NG output       Other 15 1718 313    Stool 0 0 0    Total Output 370 1818 313    Net +4088.3 +1303.3 +1098.2           Urine Occurrence 1 x  2 x 4 x    Stool Occurrence 9 x 4 x 2 x            Physical Exam  Vitals reviewed.   Constitutional:       Appearance: He is well-developed. He is obese. He is ill-appearing (chronically).   HENT:      Head: Normocephalic and atraumatic.   Cardiovascular:      Rate and Rhythm: Normal rate and regular rhythm.   Pulmonary:      Effort: Pulmonary effort is normal.      Breath sounds: Normal breath sounds.   Abdominal:      General: Bowel sounds are normal. There is no distension.      Palpations: Abdomen is soft.      Tenderness: There is no abdominal tenderness.      Comments: Surgical scars prior liver transplant   Musculoskeletal:      Cervical back: Normal range of motion and neck supple.   Skin:     General: Skin is warm and dry.   Neurological:      Mental Status: He is alert and oriented to person, place, and time.       Significant Labs:  I have reviewed all pertinent lab results within the past 24 hours.  CBC:   Recent Labs   Lab 09/02/22  1621   WBC 3.50*   RBC 2.59*   HGB 7.7*   HCT 24.2*   PLT 90*   MCV 93   MCH 29.7   MCHC 31.8*     CMP:   Recent Labs   Lab 09/02/22  0153   *   CALCIUM 7.2*   ALBUMIN 1.5*   PROT 3.8*      K 4.0   CO2 16*   *   *   CREATININE 6.2*   ALKPHOS 41*   ALT 6*   AST 17   BILITOT 0.3       Significant Diagnostics:

## 2022-09-02 NOTE — PROGRESS NOTES
[facility] - Int[facility] Care ([facility])  [facility] Medicine  Progress Note    Patient Name: Alon Adamson  MRN: [id number]  Patient Class: IP- Inpatient   Admission Date: 8/29/2022  Length of Stay: 4 days  Attending Physician: Dexter Hale, *  Primary Care Provider: Bruno Oconnor NP        Subjective:     Principal Problem:Gastrointestinal hemorrhage associated with gastric ulcer        HPI:  Mr. Adamson started throwing up blood at home Saturday morning.  It started with low abdominal pain then nausea.  The vomit was dark and grainy mixed with bright red blood.  Threw up multiple times with large amounts of bloody liquid.  He the same problem a few days ago and was admitted and endoscopy showed blood clots in his stomach.  He also got very weak and could not stand up.  His home blood pressure monitor showed as low as 47/30.  He did not lose consciousness or fall.  No chest pain or shortness of breath.  Dizzy on standing.  He received three units of blood at [facility] before transfer here.      Overview/Hospital Course:  8/30: s/p Gelfoam embolization performed of left gastric artery this am. Cont levophed and ppi. Wean levo as tolerated. Repeat h/h. Transfuse hgb < 8.   8/31: EGD performed yesterday. See GI report. H/H stable. No further active bleeding noted. Will extubate patient. Will need CTA GI bleed should pt bleed again.   9/1:  Plans in place for EGD by GI this afternoon.  H&H stable.  Continue to transfuse as indicated.  CT abdomen pelvis performed yesterday afternoon did not show signs of active bleeding.  9/2 S/P EGD yesterday :No old or fresh blood noted. No active bleeding. Exposed vessel noted in fundus of stomach with prior 3 clips in place. There was area of vessel that was exposed and not clipped off. Additional clip placed to this area. Vessel was then sprayed with water jet for a few seconds and no bleeding noted . The  H/H sligly decreased . He is s/p multiple blood  transfusion . He is tolerating clear liquid diet . GI , Nephrology and general surgery on board .       Interval History:     Review of Systems   Constitutional:  Negative for fatigue and fever.   HENT:  Negative for sinus pressure.    Eyes:  Negative for visual disturbance.   Respiratory:  Negative for shortness of breath.    Cardiovascular:  Negative for chest pain.   Gastrointestinal:  Negative for nausea and vomiting.   Endocrine: Negative.    Genitourinary:  Negative for difficulty urinating.   Musculoskeletal:  Negative for back pain.   Skin:  Negative for rash.   Allergic/Immunologic: Negative.    Neurological:  Negative for headaches.   Psychiatric/Behavioral:  Negative for confusion.    Objective:     Vital Signs (Most Recent):  Temp: 100 °F (37.8 °C) (09/02/22 1101)  Pulse: 95 (09/02/22 1101)  Resp: (!) 27 (09/02/22 1101)  BP: (!) 103/53 (09/02/22 1101)  SpO2: 98 % (09/02/22 1101)   Vital Signs (24h Range):  Temp:  [97.7 °F (36.5 °C)-100 °F (37.8 °C)] 100 °F (37.8 °C)  Pulse:  [] 95  Resp:  [17-38] 27  SpO2:  [95 %-100 %] 98 %  BP: ()/(50-62) 103/53  Arterial Line BP: ()/(38-76) 84/54     Weight: 114.7 kg (252 lb 13.9 oz)  Body mass index is 34.29 kg/m².    Intake/Output Summary (Last 24 hours) at 9/2/2022 1157  Last data filed at 9/2/2022 1100  Gross per 24 hour   Intake 3293.53 ml   Output 413 ml   Net 2880.53 ml      Physical Exam  Constitutional:       General: He is not in acute distress.     Appearance: He is well-developed. He is not diaphoretic.   HENT:      Head: Normocephalic and atraumatic.   Eyes:      Pupils: Pupils are equal, round, and reactive to light.   Cardiovascular:      Rate and Rhythm: Normal rate and regular rhythm.      Heart sounds: Normal heart sounds. No murmur heard.    No friction rub. No gallop.   Pulmonary:      Effort: Pulmonary effort is normal. No respiratory distress.      Breath sounds: Normal breath sounds. No stridor. No wheezing or rales.    Abdominal:      General: Bowel sounds are normal. There is no distension.      Palpations: Abdomen is soft. There is no mass.      Tenderness: There is no abdominal tenderness. There is no guarding.   Musculoskeletal:      Right lower leg: No edema.      Left lower leg: No edema.   Skin:     General: Skin is warm.      Findings: No erythema.   Neurological:      Mental Status: He is alert and oriented to person, place, and time.       Significant Labs: All pertinent labs within the past 24 hours have been reviewed.      Significant Imaging: I have reviewed all pertinent imaging results/findings within the past 24 hours.        Assessment/Plan:      * Gastrointestinal hemorrhage associated with gastric ulcer  9/1:   H&H stable   Patient had drop in hemoglobin yesterday   CT abdomen pelvis did not reveal signs of active bleeding   GI plans for repeat EGD this afternoon     -S/p EGD :No old or fresh blood noted. No active bleeding. Exposed vessel noted in fundus of stomach with prior 3 clips in place. There was area of vessel that was exposed and not clipped off. Additional clip placed to this area. Vessel was then sprayed with water jet for a few seconds and no bleeding noted          Peritonitis associated with peritoneal dialysis  He was started on Intra-peritoneal Vancomycin on 26 August for a fluid culture growing S.hominis.  No signs or symptoms of infection.    JACK (obstructive sleep apnea)  Diagnosed but he does not use CPAP.    Shock circulatory  Off of Levophed   Continue monitor blood pressure    ESRD on peritoneal dialysis  Consultation to nephrology for ongoing renal replacement.  Plan to resume PD on 30 Aug.  No signs of volume overload and chemistries are within acceptable limits.  Follow serial renal function chemistries.  He does make urine.  Discussed with Dr. Mo.    8/30:   Nephrology consulted on case    Status post liver transplant  Check Tacrolimus level and continue 2 mg in the morning an 1  mg in the evening.  He is managed outpatient by Dr. García.  Checking PT/INR and LFTs.     8/30:  Continue tacrolimus    DM2 (diabetes mellitus, type 2)  Patient's FSGs are uncontrolled due to hyperglycemia on current medication regimen.  Last A1c reviewed-   Lab Results   Component Value Date    HGBA1C 5.6 08/23/2022     Most recent fingerstick glucose reviewed-   Recent Labs   Lab 09/01/22  1754 09/01/22  2334 09/02/22  0539 09/02/22  0819   POCTGLUCOSE 106 164* 106 106     Current correctional scale  Low  Maintain anti-hyperglycemic dose as follows-   Antihyperglycemics (From admission, onward)    Start     Stop Route Frequency Ordered    08/31/22 1013  insulin aspart U-100 pen 1-10 Units         -- SubQ Every 6 hours PRN 08/31/22 0913        Hold Oral hypoglycemics while patient is in the hospital.    HTN (hypertension)  Holding home medication for hypotension.  Resume when appropriate.    Coronary artery disease involving native coronary artery of native heart without angina pectoris  Holding Aspirin and Atorvastatin.  No chest pain or shortness of breath.  EKG as needed.      VTE Risk Mitigation (From admission, onward)         Ordered     Reason for No Pharmacological VTE Prophylaxis  Once        Question:  Reasons:  Answer:  Active Bleeding    08/29/22 1906     IP VTE HIGH RISK PATIENT  Once         08/29/22 1906     Place sequential compression device  Until discontinued         08/29/22 1906                Discharge Planning   MARIO:      Code Status: Full Code   Is the patient medically ready for discharge?:     Reason for patient still in hospital (select all that apply): Treatment  Discharge Plan A: Home with family            Critical care time spent on the evaluation and treatment of severe organ dysfunction, review of pertinent labs and imaging studies, discussions with consulting providers and discussions with patient/family: 37 minutes.      Dexter Hale MD  Department of Hospital Medicine    [facility] - Intensive Care ([facility])

## 2022-09-02 NOTE — PHYSICIAN QUERY
PT Name: Alon Adamson  MR #: [id number]     DOCUMENTATION CLARIFICATION      CDS: Brandy Capley, RN  Email: [email or web address]     This form is a permanent document in the medical record.    Query Date: September 2, 2022    By submitting this query, we are merely seeking further clarification of documentation to reflect the severity of illness of your patient. Please utilize your independent clinical judgment when addressing the question(s) below.     The Medical Record contains the following:   Indicators   Supporting Clinical Findings Location in Medical Record   X Gastrointestinal Ulcer Documented   Patient with PMH of recent upper GI bleed due to gastric ulcer transferred back to [facility] for recurrent bleeding.     Bleeding gastric ulcer     GI consult 8/30   X EGD/Colonscopy Findings EGD completed. No active bleeding noted but large amount of old blood along with old and new clot material found in gastric fundus in area of previously noted gastric ulceration. Cleaned out clot and old blood for > 1 hour with > 1200 cc removed. However, there was still a large amount of residual blood and clot remaining with poor visibility of any underlying lesion. Therefore decision made to stop procedure at this time.     EGD completed. No old or fresh blood noted. No active bleeding. Exposed vessel noted in fundus of stomach with prior 3 clips in place. There was area of vessel that was exposed and not clipped off. Additional clip placed to this area. Vessel was then sprayed with water jet for a few seconds and no bleeding noted.    GI brief op note 8/30              GI brief op note 9/1    Pathology Findings     X Radiology Findings Moderate rugal fold thickening within the stomach consistent with gastritis.  High density material within the stomach could reflect blood products.  Clips are seen in the fundal region of the stomach.  Evaluation for active hemorrhage limited due to phase of contrast.   CT 8/31   X  Treatment/Medication Continue PPI gtt and to trend H&H and transfuse to keep Hb > 7    Gelfoam embolization performed of left gastric artery without complication. No active bleeding visualized during the procedure.    GI consult 8/30    Op note 8/30     X Other The patient presented to the ER for hematemesis. He was recently admitted here for the same, and underwent EGD. He was found to have a gastric ulcer with visible vessel treated with epi, bipolar cautery and hemostatic clip placement. He was discharged a couple days later in stable condition. Unfortunately, he started vomiting blood again three days ago. He was hypotensive and reported to [facility]. He was transferred here for a higher level of care. He received three units of blood prior to transfer.    Shock circulatory   GI consult 8/30              H&P 8/29      Please further specify the acuity of the  __gastric_________ ulcer:    [  x ] Acute   [   ] Chronic   [   ] Other (please specify): ___________   [   ] Clinically Undetermined       Please document in your progress notes daily for the duration of treatment until resolved, and include in your discharge summary.  Form No. [id number]

## 2022-09-02 NOTE — PROGRESS NOTES
[facility] ([facility])  Gastroenterology  Progress Note    Patient Name: Alon Adamson  MRN: [id number]  Admission Date: 8/29/2022  Hospital Length of Stay: 4 days  Code Status: Full Code   Attending Provider: Dexter Hale, *  Consulting Provider: Aneesh Alas PA-C  Primary Care Physician: Bruno Oconnor NP  Principal Problem: Gastrointestinal hemorrhage associated with gastric ulcer      Subjective:     Interval History: s/p EGD yesterday with an additional clip placed. No n/v this morning. Had a bloody stool overnight. Hgb stable.     Review of Systems   Constitutional:         See Interval History for daily ROS.    Objective:     Vital Signs (Most Recent):  Temp: 100 °F (37.8 °C) (09/02/22 1101)  Pulse: 95 (09/02/22 1101)  Resp: (!) 27 (09/02/22 1101)  BP: (!) 103/53 (09/02/22 1101)  SpO2: 98 % (09/02/22 1101)   Vital Signs (24h Range):  Temp:  [97.7 °F (36.5 °C)-100 °F (37.8 °C)] 100 °F (37.8 °C)  Pulse:  [] 95  Resp:  [17-38] 27  SpO2:  [95 %-100 %] 98 %  BP: ()/(50-62) 103/53  Arterial Line BP: ()/(38-76) 84/54     Weight: 114.7 kg (252 lb 13.9 oz) (09/02/22 1041)  Body mass index is 34.29 kg/m².      Intake/Output Summary (Last 24 hours) at 9/2/2022 1142  Last data filed at 9/2/2022 1100  Gross per 24 hour   Intake 3293.53 ml   Output 413 ml   Net 2880.53 ml       Lines/Drains/Airways       Central Venous Catheter Line  Duration                  Hemodialysis Catheter -- days              Peripheral Intravenous Line  Duration                  Peripheral IV - Single Lumen 08/30/22 0052 20 G Anterior;Left;Proximal Forearm 3 days         Peripheral IV - Single Lumen 08/30/22 0714 20 G Anterior;Right Upper Arm 3 days                    Physical Exam  Constitutional:       General: He is not in acute distress.     Appearance: He is well-developed. He is not diaphoretic.   HENT:      Head: Normocephalic and atraumatic.   Eyes:      Extraocular Movements: Extraocular  movements intact.   Cardiovascular:      Rate and Rhythm: Normal rate and regular rhythm.   Pulmonary:      Effort: Pulmonary effort is normal. No respiratory distress.      Breath sounds: Normal breath sounds. No wheezing.   Abdominal:      General: Bowel sounds are normal. There is no distension.      Palpations: Abdomen is soft.      Tenderness: There is no abdominal tenderness.   Neurological:      Mental Status: He is alert and oriented to person, place, and time.      Cranial Nerves: No cranial nerve deficit.   Psychiatric:         Behavior: Behavior normal.       Significant Labs:  CBC:   Recent Labs   Lab 09/01/22  2252 09/02/22  0642 09/02/22  0949   WBC 4.88 3.90 4.04   HGB 8.2* 7.8* 7.6*   HCT 24.7* 24.4* 24.1*   PLT 86* 93* 89*         Significant Imaging:  Imaging results within the past 24 hours have been reviewed.    Assessment/Plan:     * Gastrointestinal hemorrhage associated with gastric ulcer  S/p embolization by IR (08/30/22). Last EGD with additional clip placed (09/01/22).  He received a unit of blood yesterday morning. Hgb 7.6 today. Continue to monitor and transfuse additional units as needed.   Continue Protonix drip.   Continue clear liquid diet today if tolerating.   Monitor H/H and stool output. Transfuse additional units as needed.     ESRD on peritoneal dialysis  Nephrology following for dialysis management.          Thank you for your consult. I will follow-up with patient. Please contact us if you have any additional questions.    Aneesh Alas PA-C  Gastroenterology  [facility] ([facility])

## 2022-09-02 NOTE — PROGRESS NOTES
[facility] Surg  General Surgery  Progress Note    Subjective:     History of Present Illness:  67-year-old male referred for upper GI bleed.  The patient has undergone EGD with clipping of bleeding gastric vessel as well as IR embolization.  EGD today showed no active bleeding.  H&H stable today.  History of liver transplant.      Post-Op Info:  Procedure(s) (LRB):  EGD (ESOPHAGOGASTRODUODENOSCOPY) (N/A)   1 Day Post-Op     Interval History: patient reports stools improving, h/h stable today on clears    Medications:  Continuous Infusions:   pantoprazole (PROTONIX) IV infusion 8 mg/hr (09/02/22 1737)     Scheduled Meds:   levothyroxine  50 mcg Oral Daily    mupirocin   Nasal BID    sodium bicarbonate  650 mg Oral BID    sucralfate  1 g Oral Q6H    tacrolimus  1 mg Oral Daily PM    tacrolimus  2 mg Oral Daily AM     PRN Meds:aluminum-magnesium hydroxide-simethicone, dextrose 10%, glucagon (human recombinant), glucose, glucose, insulin aspart U-100, melatonin, naloxone, ondansetron, ondansetron, promethazine (PHENERGAN) IVPB, simethicone, sodium chloride 0.9%     Review of patient's allergies indicates:   Allergen Reactions    Nsaids (non-steroidal anti-inflammatory drug)      D/t to liver disease.  Other reaction(s): Unknown    Penicillins Other (See Comments)     Tolerated pip-tazo in 8/2016 without issue  Tolerated cefepime 7/2018 without issue  Since childhood was told not to take it  Other reaction(s): Unknown     Objective:     Vital Signs (Most Recent):  Temp: 98.8 °F (37.1 °C) (09/02/22 1501)  Pulse: 77 (09/02/22 1700)  Resp: 20 (09/02/22 1700)  BP: (!) 105/56 (09/02/22 1700)  SpO2: 98 % (09/02/22 1700)   Vital Signs (24h Range):  Temp:  [98.8 °F (37.1 °C)-100 °F (37.8 °C)] 98.8 °F (37.1 °C)  Pulse:  [] 77  Resp:  [20-34] 20  SpO2:  [96 %-100 %] 98 %  BP: ()/(50-59) 105/56     Weight: 114.7 kg (252 lb 13.9 oz)  Body mass index is 34.29 kg/m².    Intake/Output - Last 3 Shifts          08/31 0700  09/01 0659 09/01 0700 09/02 0659 09/02 0700 09/03 0659    P.O.   120    I.V. (mL/kg) 2350.3 (20.8) 2647.6 (23.1) 1291.2 (11.3)    Blood 1125      IV Piggyback 983 473.7     Total Intake(mL/kg) 4458.3 (39.5) 3121.3 (27.2) 1411.2 (12.3)    Urine (mL/kg/hr) 355 (0.1) 100 (0) 0 (0)    Emesis/NG output       Other 15 1718 313    Stool 0 0 0    Total Output 370 1818 313    Net +4088.3 +1303.3 +1098.2           Urine Occurrence 1 x 2 x 4 x    Stool Occurrence 9 x 4 x 2 x            Physical Exam  Vitals reviewed.   Constitutional:       Appearance: He is well-developed. He is obese. He is ill-appearing (chronically).   HENT:      Head: Normocephalic and atraumatic.   Cardiovascular:      Rate and Rhythm: Normal rate and regular rhythm.   Pulmonary:      Effort: Pulmonary effort is normal.      Breath sounds: Normal breath sounds.   Abdominal:      General: Bowel sounds are normal. There is no distension.      Palpations: Abdomen is soft.      Tenderness: There is no abdominal tenderness.      Comments: Surgical scars prior liver transplant   Musculoskeletal:      Cervical back: Normal range of motion and neck supple.   Skin:     General: Skin is warm and dry.   Neurological:      Mental Status: He is alert and oriented to person, place, and time.       Significant Labs:  I have reviewed all pertinent lab results within the past 24 hours.  CBC:   Recent Labs   Lab 09/02/22  1621   WBC 3.50*   RBC 2.59*   HGB 7.7*   HCT 24.2*   PLT 90*   MCV 93   MCH 29.7   MCHC 31.8*     CMP:   Recent Labs   Lab 09/02/22  0153   *   CALCIUM 7.2*   ALBUMIN 1.5*   PROT 3.8*      K 4.0   CO2 16*   *   *   CREATININE 6.2*   ALKPHOS 41*   ALT 6*   AST 17   BILITOT 0.3       Significant Diagnostics:      Assessment/Plan:     * Gastrointestinal hemorrhage associated with gastric ulcer  No signs of active bleeding currently. H&H stable today.    Diet as tolerated  If were to rebleed would recommend  transferring to a facility with liver transplant surgery available should the patient need surgical intervention, however if patient became unstable secondary to bleeding would be available for exploration should this be needed emergently.  Will sign off call with questions or concerns      ESRD on peritoneal dialysis  Management per Nephrology    Status post liver transplant  Management management per hepatology  Patient would be at high risk for surgery with prior liver transplant and no transplant surgeon available at this facility should the patient require exploratory surgery.  Would recommend transferring to a facility with liver transplant surgery available should the patient need surgical intervention    DM2 (diabetes mellitus, type 2)  Medical management    HTN (hypertension)  Medical management    Coronary artery disease involving native coronary artery of native heart without angina pectoris  Medical management        Ricky Storm MD  General Surgery  [facility] Med S[facility]

## 2022-09-02 NOTE — ASSESSMENT & PLAN NOTE
Patient's FSGs are uncontrolled due to hyperglycemia on current medication regimen.  Last A1c reviewed-   Lab Results   Component Value Date    HGBA1C 5.6 08/23/2022     Most recent fingerstick glucose reviewed-   Recent Labs   Lab 09/01/22  1754 09/01/22  2334 09/02/22  0539 09/02/22  0819   POCTGLUCOSE 106 164* 106 106     Current correctional scale  Low  Maintain anti-hyperglycemic dose as follows-   Antihyperglycemics (From admission, onward)    Start     Stop Route Frequency Ordered    08/31/22 1013  insulin aspart U-100 pen 1-10 Units         -- SubQ Every 6 hours PRN 08/31/22 0913        Hold Oral hypoglycemics while patient is in the hospital.

## 2022-09-03 NOTE — SUBJECTIVE & OBJECTIVE
Interval History:     Review of Systems   Constitutional:  Negative for fatigue and fever.   HENT:  Negative for sinus pressure.    Eyes:  Negative for visual disturbance.   Respiratory:  Negative for shortness of breath.    Cardiovascular:  Negative for chest pain.   Gastrointestinal:  Negative for nausea and vomiting.   Endocrine: Negative.    Genitourinary:  Negative for difficulty urinating.   Musculoskeletal:  Negative for back pain.   Skin:  Negative for rash.   Allergic/Immunologic: Negative.    Neurological:  Negative for headaches.   Psychiatric/Behavioral:  Negative for confusion.    Objective:     Vital Signs (Most Recent):  Temp: 98.4 °F (36.9 °C) (09/03/22 0727)  Pulse: 81 (09/03/22 0905)  Resp: 20 (09/03/22 0727)  BP: (!) 105/53 (09/03/22 0727)  SpO2: (!) 94 % (09/03/22 0727)   Vital Signs (24h Range):  Temp:  [98.1 °F (36.7 °C)-98.8 °F (37.1 °C)] 98.4 °F (36.9 °C)  Pulse:  [74-88] 81  Resp:  [17-24] 20  SpO2:  [94 %-100 %] 94 %  BP: ()/(50-58) 105/53     Weight: 114.7 kg (252 lb 13.9 oz)  Body mass index is 34.29 kg/m².    Intake/Output Summary (Last 24 hours) at 9/3/2022 1122  Last data filed at 9/3/2022 1102  Gross per 24 hour   Intake 1547.17 ml   Output 41 ml   Net 1506.17 ml      Physical Exam  Vitals and nursing note reviewed.   Constitutional:       General: He is awake. He is not in acute distress.     Appearance: He is well-developed. He is obese. He is ill-appearing. He is not toxic-appearing or diaphoretic.      Interventions: He is not intubated.  HENT:      Head: Normocephalic and atraumatic.      Mouth/Throat:      Mouth: Mucous membranes are moist.   Eyes:      General: Lids are normal.      Pupils: Pupils are equal, round, and reactive to light.   Neck:      Trachea: Trachea normal.   Cardiovascular:      Rate and Rhythm: Normal rate and regular rhythm.      Pulses:           Radial pulses are 1+ on the right side and 1+ on the left side.        Dorsalis pedis pulses are 1+ on  the right side and 1+ on the left side.      Heart sounds: Murmur heard.   Systolic murmur is present.   Pulmonary:      Effort: Pulmonary effort is normal. No tachypnea, accessory muscle usage or respiratory distress. He is not intubated.      Breath sounds: Normal breath sounds.   Chest:      Chest wall: No deformity or tenderness.   Abdominal:      General: Bowel sounds are increased. There is no distension.      Palpations: Abdomen is soft.      Tenderness: There is no abdominal tenderness.          Comments: Obese   Genitourinary:     Penis: Normal.    Musculoskeletal:         General: Normal range of motion.      Cervical back: Normal range of motion.      Right lower leg: No edema.      Left lower leg: No edema.      Right foot: No deformity.      Left foot: No deformity.   Lymphadenopathy:      Cervical: No cervical adenopathy.   Skin:     General: Skin is warm and dry.      Capillary Refill: Capillary refill takes less than 2 seconds.      Findings: No rash.   Neurological:      General: No focal deficit present.      Mental Status: He is alert and oriented to person, place, and time.   Psychiatric:         Attention and Perception: Attention normal.         Mood and Affect: Mood normal.         Speech: Speech normal.         Behavior: Behavior normal. Behavior is cooperative.         Thought Content: Thought content normal.         Cognition and Memory: Cognition normal.         Judgment: Judgment normal.       Significant Labs: All pertinent labs within the past 24 hours have been reviewed.      Significant Imaging: I have reviewed all pertinent imaging results/findings within the past 24 hours.

## 2022-09-03 NOTE — PT/OT/SLP EVAL
Occupational Therapy   Evaluation    Name: Alon Adamson  MRN: [id number]  Admitting Diagnosis:  Gastrointestinal hemorrhage associated with gastric ulcer  Recent Surgery: Procedure(s) (LRB):  EGD (ESOPHAGOGASTRODUODENOSCOPY) (N/A) 2 Days Post-Op    Recommendations:     Discharge Recommendations: home health OT, nursing facility, skilled  Discharge Equipment Recommendations:  none  Barriers to discharge:  None    Assessment:     Alon Adamson is a 67 y.o. male with a medical diagnosis of Gastrointestinal hemorrhage associated with gastric ulcer.  He presents with SELF CARE DEBILITY. Performance deficits affecting function: weakness, impaired endurance, impaired self care skills, impaired functional mobility, gait instability, impaired balance, decreased upper extremity function, decreased lower extremity function, pain.      Rehab Prognosis: Good; patient would benefit from acute skilled OT services to address these deficits and reach maximum level of function.       Plan:     Patient to be seen 2 x/week to address the above listed problems via self-care/home management, therapeutic activities, therapeutic exercises  Plan of Care Expires: 09/17/22  Plan of Care Reviewed with: patient, spouse, son    Subjective     Chief Complaint: SCROTAL PAIN  Patient/Family Comments/goals: TO INCREASE (I) AND DECREASE SWELLING OF SCROTUM.    Occupational Profile:  Living Environment: PATIENT LIVES IN DOUBLE WIDE WITH 5 STEPS WITH WIFE AND SON.   Previous level of function: PATIENT STATED HE WAS (I) WITH ALL LEVELS OF SELF CARE AND WAS ABLE TO AMBULATE WITHOUT (A) DEVICE.    Roles and Routines: PATIENT WAS NOT DRIVING.   Equipment Used at Home:  wheelchair, walker, rolling, bedside commode (PATIENT AND WIFE STATED EQUIPMENT WAS NOT BEING USED BEFORE ADMIT.)  Assistance upon Discharge: FAMILY    Pain/Comfort:  Pain Rating 1: 5/10 (5/10 ON NON-VERBAL PAIN SCALE WITH DISCOMFORT WHEN SEATED ON CHAIR WITH C/O SCROTUM  SWOLLEN.)  Pain Addressed 1: Reposition    Patients cultural, spiritual, Cheondoism conflicts given the current situation:      Objective:     Communicated with: NURSE prior to session.  Patient found supine with peripheral IV, telemetry upon OT entry to room.    General Precautions: Standard,   FALL  Orthopedic Precautions:    Braces:    Respiratory Status: Room air    Occupational Performance:    Bed Mobility:    Patient completed Supine to Sit with contact guard assistance    Functional Mobility/Transfers:  Patient completed Sit <> Stand Transfer with minimum assistance  with  rolling walker   Patient completed Bed <> Chair Transfer using Stand Pivot technique with minimum assistance with rolling walker  Functional Mobility: MIN (A) WITH SIT <> STAND AND STAND PIVOT T/F.    Activities of Daily Living:  Upper Body Dressing: minimum assistance    Lower Body Dressing: PATIENT USED SOCK AID PTA AND WAS UNABLE TO DON/DOFF SOCKS DURING EVAL.      Cognitive/Visual Perceptual:  NO DEFICITS NOTED.    Physical Exam:  Balance:    -       PATIENT WITH SLOW, UNSTEADY GAIT.    AMPAC 6 Click ADL:  AMPAC Total Score: 15    Treatment & Education:  OT EVAL PERFORMED.  PATIENT EDUCATED RE:  PURPOSE OF OT.  PATIENT PARTICIPATED IN FUN MOBILITY AND SELF CARE TASKS.    Patient left up in chair with all lines intact, call button in reach, and FAMILY present    GOALS:   Multidisciplinary Problems       Occupational Therapy Goals          Problem: Occupational Therapy    Goal Priority Disciplines Outcome Interventions   Occupational Therapy Goal     OT, PT/OT     Description: LTG'S TO BE MET IN 14 DAYS (9/17/22):      1)  PATIENT WILL PERFORM UB DRESSING WITH SBA.     2)  PATIENT WILL PERFORM LB DRESSING WITH SBA WITH HIP KIT.    3)  PATIENT WILL PERFORM TOILET T/F WITH SBA WITH RW.    4)  PATIENT WILL PERFORM BUE HEP FOR INCREASED FUNC STRENGTH AND ENDURANCE WITH (S) FOR PROPER TECHNIQUE.                        History:     Past  Medical History:   Diagnosis Date    Allergy     Anticoagulant long-term use     Arthritis     Back pain     Cellulitis     Congenital heart disease     Hearing loss     right ear    Hepatic encephalopathy     HTN (hypertension)     Hypertension     diagnosed today (11/25/2015) and prescribed toprol    Leg edema     Nephrolithiasis     JACK (obstructive sleep apnea)     JACK (obstructive sleep apnea)     Seizures     per Pt last seizure 2018- controlled wit medication    Splenomegaly          Past Surgical History:   Procedure Laterality Date    APPENDECTOMY      Open    BACK SURGERY      CHOLECYSTECTOMY      laprascopic    COLONOSCOPY N/A 1/25/2018    Procedure: COLONOSCOPY;  Surgeon: Lashawn Myles MD;  Location: [facility] (2ND FLR);  Service: Endoscopy;  Laterality: N/A;    COLONOSCOPY N/A 12/20/2021    Procedure: COLONOSCOPY;  Surgeon: Jesus Grayson MD;  Location: [facility] (2ND FLR);  Service: Endoscopy;  Laterality: N/A;  start with a pediatric colonoscope and might need the slim pediatric colonoscope available.   priority case per Dr. Grayson-labwork am of procedure  RAPID COVID test coming for > 3 hrs away/ prep ins. [email or web address] / Pt requesting specific date for travel purposes    ESOPHAGOGASTRODUODENOSCOPY N/A 8/23/2022    Procedure: EGD (ESOPHAGOGASTRODUODENOSCOPY);  Surgeon: Nora Houser MD;  Location: [facility];  Service: Endoscopy;  Laterality: N/A;    ESOPHAGOGASTRODUODENOSCOPY N/A 8/30/2022    Procedure: EGD (ESOPHAGOGASTRODUODENOSCOPY);  Surgeon: Breanna Palma MD;  Location: [facility];  Service: Endoscopy;  Laterality: N/A;    ESOPHAGOGASTRODUODENOSCOPY N/A 9/1/2022    Procedure: EGD (ESOPHAGOGASTRODUODENOSCOPY);  Surgeon: Breanna Palma MD;  Location: [facility] ENDO;  Service: Endoscopy;  Laterality: N/A;    FLUOROSCOPY N/A 8/30/2022    Procedure: FLUOROSCOPYembolization;  Surgeon: Elan Rodriguez Jr., MD;  Location: [facility] CATH LAB;  Service: General;  Laterality: N/A;     LIVER TRANSPLANT N/A 7/23/2018    Procedure: TRANSPLANT, LIVER;  Surgeon: Alma Joyce MD;  Location: [address];  Service: Transplant;  Laterality: N/A;    LUMBAR DISCECTOMY      SPLENIC ARTERY EMBOLIZATION  04/08/2016    Dr Taveras    THORACENTESIS Left 8/3/2020    Procedure: Thoracentesis  U/S notified;  Surgeon: Augustine Rodriguez MD;  Location: [facility] ENDO;  Service: Pulmonary;  Laterality: Left;    THORACENTESIS Left 4/14/2021    Procedure: Thoracentesis;  Surgeon: Augustine Rodriguez MD;  Location: [facility];  Service: Pulmonary;  Laterality: Left;    TONSILLECTOMY         Time Tracking:     OT Date of Treatment: 09/03/22  OT Start Time: 1154  OT Stop Time: 1218  OT Total Time (min): 24 min    Billable Minutes:Evaluation 10  Therapeutic Activity 14    9/3/2022

## 2022-09-03 NOTE — PROGRESS NOTES
[facility] Medicine  Progress Note    Patient Name: Alon Adamson  MRN: [id number]  Patient Class: IP- Inpatient   Admission Date: 8/29/2022  Length of Stay: 5 days  Attending Physician: Dexter Hale, *  Primary Care Provider: Bruno Oconnor NP        Subjective:     Principal Problem:Gastrointestinal hemorrhage associated with gastric ulcer        HPI:  Mr. Adamson started throwing up blood at home Saturday morning.  It started with low abdominal pain then nausea.  The vomit was dark and grainy mixed with bright red blood.  Threw up multiple times with large amounts of bloody liquid.  He the same problem a few days ago and was admitted and endoscopy showed blood clots in his stomach.  He also got very weak and could not stand up.  His home blood pressure monitor showed as low as 47/30.  He did not lose consciousness or fall.  No chest pain or shortness of breath.  Dizzy on standing.  He received three units of blood at [facility] before transfer here.      Overview/Hospital Course:  8/30: s/p Gelfoam embolization performed of left gastric artery this am. Cont levophed and ppi. Wean levo as tolerated. Repeat h/h. Transfuse hgb < 8.   8/31: EGD performed yesterday. See GI report. H/H stable. No further active bleeding noted. Will extubate patient. Will need CTA GI bleed should pt bleed again.   9/1:  Plans in place for EGD by GI this afternoon.  H&H stable.  Continue to transfuse as indicated.  CT abdomen pelvis performed yesterday afternoon did not show signs of active bleeding.  9/2 S/P EGD yesterday :No old or fresh blood noted. No active bleeding. Exposed vessel noted in fundus of stomach with prior 3 clips in place. There was area of vessel that was exposed and not clipped off. Additional clip placed to this area. Vessel was then sprayed with water jet for a few seconds and no bleeding noted . The  H/H sligly decreased . He is s/p multiple blood transfusion . He  is tolerating clear liquid diet . GI , Nephrology and general surgery on board .   9/3 he denies any new complaint for today . No obvious bloody  or black stool over the last 24 hrs .  The H/H has remain stable . NAEON . He did not received a blood transfusion yesterday .      Interval History:     Review of Systems   Constitutional:  Negative for fatigue and fever.   HENT:  Negative for sinus pressure.    Eyes:  Negative for visual disturbance.   Respiratory:  Negative for shortness of breath.    Cardiovascular:  Negative for chest pain.   Gastrointestinal:  Negative for nausea and vomiting.   Endocrine: Negative.    Genitourinary:  Negative for difficulty urinating.   Musculoskeletal:  Negative for back pain.   Skin:  Negative for rash.   Allergic/Immunologic: Negative.    Neurological:  Negative for headaches.   Psychiatric/Behavioral:  Negative for confusion.    Objective:     Vital Signs (Most Recent):  Temp: 98.4 °F (36.9 °C) (09/03/22 0727)  Pulse: 81 (09/03/22 0905)  Resp: 20 (09/03/22 0727)  BP: (!) 105/53 (09/03/22 0727)  SpO2: (!) 94 % (09/03/22 0727)   Vital Signs (24h Range):  Temp:  [98.1 °F (36.7 °C)-98.8 °F (37.1 °C)] 98.4 °F (36.9 °C)  Pulse:  [74-88] 81  Resp:  [17-24] 20  SpO2:  [94 %-100 %] 94 %  BP: ()/(50-58) 105/53     Weight: 114.7 kg (252 lb 13.9 oz)  Body mass index is 34.29 kg/m².    Intake/Output Summary (Last 24 hours) at 9/3/2022 1122  Last data filed at 9/3/2022 1102  Gross per 24 hour   Intake 1547.17 ml   Output 41 ml   Net 1506.17 ml      Physical Exam  Vitals and nursing note reviewed.   Constitutional:       General: He is awake. He is not in acute distress.     Appearance: He is well-developed. He is obese. He is ill-appearing. He is not toxic-appearing or diaphoretic.      Interventions: He is not intubated.  HENT:      Head: Normocephalic and atraumatic.      Mouth/Throat:      Mouth: Mucous membranes are moist.   Eyes:      General: Lids are normal.      Pupils: Pupils  are equal, round, and reactive to light.   Neck:      Trachea: Trachea normal.   Cardiovascular:      Rate and Rhythm: Normal rate and regular rhythm.      Pulses:           Radial pulses are 1+ on the right side and 1+ on the left side.        Dorsalis pedis pulses are 1+ on the right side and 1+ on the left side.      Heart sounds: Murmur heard.   Systolic murmur is present.   Pulmonary:      Effort: Pulmonary effort is normal. No tachypnea, accessory muscle usage or respiratory distress. He is not intubated.      Breath sounds: Normal breath sounds.   Chest:      Chest wall: No deformity or tenderness.   Abdominal:      General: Bowel sounds are increased. There is no distension.      Palpations: Abdomen is soft.      Tenderness: There is no abdominal tenderness.          Comments: Obese   Genitourinary:     Penis: Normal.    Musculoskeletal:         General: Normal range of motion.      Cervical back: Normal range of motion.      Right lower leg: No edema.      Left lower leg: No edema.      Right foot: No deformity.      Left foot: No deformity.   Lymphadenopathy:      Cervical: No cervical adenopathy.   Skin:     General: Skin is warm and dry.      Capillary Refill: Capillary refill takes less than 2 seconds.      Findings: No rash.   Neurological:      General: No focal deficit present.      Mental Status: He is alert and oriented to person, place, and time.   Psychiatric:         Attention and Perception: Attention normal.         Mood and Affect: Mood normal.         Speech: Speech normal.         Behavior: Behavior normal. Behavior is cooperative.         Thought Content: Thought content normal.         Cognition and Memory: Cognition normal.         Judgment: Judgment normal.       Significant Labs: All pertinent labs within the past 24 hours have been reviewed.      Significant Imaging: I have reviewed all pertinent imaging results/findings within the past 24 hours.        Assessment/Plan:      *  Gastrointestinal hemorrhage associated with gastric ulcer  9/1:   H&H stable   Patient had drop in hemoglobin yesterday   CT abdomen pelvis did not reveal signs of active bleeding   GI plans for repeat EGD this afternoon     -S/p EGD :No old or fresh blood noted. No active bleeding. Exposed vessel noted in fundus of stomach with prior 3 clips in place. There was area of vessel that was exposed and not clipped off. Additional clip placed to this area. Vessel was then sprayed with water jet for a few seconds and no bleeding noted          Peritonitis associated with peritoneal dialysis  He was started on Intra-peritoneal Vancomycin on 26 August for a fluid culture growing S.hominis.  No signs or symptoms of infection.    JACK (obstructive sleep apnea)  Diagnosed but he does not use CPAP.    ESRD on peritoneal dialysis  Consultation to nephrology for ongoing renal replacement.  Plan to resume PD on 30 Aug.  No signs of volume overload and chemistries are within acceptable limits.  Follow serial renal function chemistries.  He does make urine.  Discussed with Dr. Mo.    8/30:   Nephrology consulted on case    Status post liver transplant  Check Tacrolimus level and continue 2 mg in the morning an 1 mg in the evening.  He is managed outpatient by Dr. García.  Checking PT/INR and LFTs.     8/30:  Continue tacrolimus    DM2 (diabetes mellitus, type 2)  Patient's FSGs are uncontrolled due to hyperglycemia on current medication regimen.  Last A1c reviewed-   Lab Results   Component Value Date    HGBA1C 5.6 08/23/2022     Most recent fingerstick glucose reviewed-   Recent Labs   Lab 09/02/22  1210 09/03/22  0122 09/03/22  0514 09/03/22  1100   POCTGLUCOSE 125* 142* 116* 112*     Current correctional scale  Low  Maintain anti-hyperglycemic dose as follows-   Antihyperglycemics (From admission, onward)    Start     Stop Route Frequency Ordered    08/31/22 1013  insulin aspart U-100 pen 1-10 Units         -- SubQ Every 6 hours  PRN 08/31/22 0913        Hold Oral hypoglycemics while patient is in the hospital.    HTN (hypertension)  Holding home medication for hypotension.  Resume when appropriate.    Coronary artery disease involving native coronary artery of native heart without angina pectoris  Holding Aspirin and Atorvastatin.  No chest pain or shortness of breath.  EKG as needed.      VTE Risk Mitigation (From admission, onward)         Ordered     Reason for No Pharmacological VTE Prophylaxis  Once        Question:  Reasons:  Answer:  Active Bleeding    08/29/22 1906     IP VTE HIGH RISK PATIENT  Once         08/29/22 1906     Place sequential compression device  Until discontinued         08/29/22 1906                Discharge Planning   MARIO:      Code Status: Full Code   Is the patient medically ready for discharge?:     Reason for patient still in hospital (select all that apply): Treatment  Discharge Plan A: Home with family                  Dexter Hale MD  Department of Hospital Medicine   [facility] - Med Surg

## 2022-09-03 NOTE — PROGRESS NOTES
09/03/22 1800   Peritoneal Dialysis   Exchange Type Cycler   Peritoneal Treatment Status Started   Dianeal Solution Dextrose 1.5% in 6000 mL   Dianeal Additive Other (Comment)   Cycler Peritoneal Dialysis   Effluent Appearance Lisa   Number of Cycles 3   Fill Volume (mL) 2000 mL   Total Volume (mL) 6000 mL   Dwell Time (specify hr/min) 1:34   Cycler Treatment Comments CCPD in progress, dressing changed. bed in low position, call bell in reach.

## 2022-09-03 NOTE — ASSESSMENT & PLAN NOTE
Patient's FSGs are uncontrolled due to hyperglycemia on current medication regimen.  Last A1c reviewed-   Lab Results   Component Value Date    HGBA1C 5.6 08/23/2022     Most recent fingerstick glucose reviewed-   Recent Labs   Lab 09/02/22  1210 09/03/22  0122 09/03/22  0514 09/03/22  1100   POCTGLUCOSE 125* 142* 116* 112*     Current correctional scale  Low  Maintain anti-hyperglycemic dose as follows-   Antihyperglycemics (From admission, onward)    Start     Stop Route Frequency Ordered    08/31/22 1013  insulin aspart U-100 pen 1-10 Units         -- SubQ Every 6 hours PRN 08/31/22 0913        Hold Oral hypoglycemics while patient is in the hospital.

## 2022-09-03 NOTE — PLAN OF CARE
Pt remains free from falls/injuries this shift. Safety precautions maintained. No c/o pain. No s/s of acute distress noted. VSS. Tele monitoring per orders. Turn q 2hr. Pt had BM that was brown, no blood noted. Will continue to monitor. Chart check completed.

## 2022-09-03 NOTE — PROGRESS NOTES
09/02/22 2000   Vitals   BP (!) 103/54   Temp 98.1 °F (36.7 °C)   Temp src Axillary   Pulse 80   Resp 18   SpO2 99 %   Pulse Oximetry Type Intermittent   Weight 114.7 kg (252 lb 13.9 oz)   Peritoneal Dialysis   Exchange Type Cycler   Peritoneal Treatment Status Started   Dianeal Solution Dextrose 1.5% in 6000 mL   Dianeal Additive Other (Comment)  (none)   Dianeal Antibiotic none   Cycler Peritoneal Dialysis   Effluent Appearance Lisa   Number of Cycles 3   Fill Volume (mL) 2000 mL   Total Volume (mL) 6000 mL   Dwell Time (specify hr/min) 1:34   Cycler Treatment Comments CCPD now in progress, drsg changed according to P&P, insertion site looks good , warm, pink and dry . no drainage noted. bed left in lowest position and call bell within reach. RTF with primary RN as same.

## 2022-09-03 NOTE — PLAN OF CARE
OT EVAL COMPLETE.  PATIENT WOULD BENEFIT FROM CONT SKILLED OT INTERVENTION.  OT RECOMMENDS SNF VS HH AT D/C.

## 2022-09-03 NOTE — PROGRESS NOTES
09/03/22 0727   Vitals   BP (!) 105/53   Temp 98.4 °F (36.9 °C)   Temp src Oral   Pulse 80   Resp 20   SpO2 (!) 94 %   Pulse Oximetry Type Intermittent   Peritoneal Dialysis   Exchange Type Cycler   Peritoneal Treatment Status Completed   Dianeal Solution Dextrose 1.5% in 6000 mL   Dianeal Additive Other (Comment)  (none)   Dianeal Antibiotic none   Cycler Peritoneal Dialysis   Initial Drain Volume (mL) 143 mL   Effluent Appearance Clear   Number of Cycles 3   Fill Volume (mL) 2000 mL   Total Volume (mL) 6000 mL   Dwell Time (specify hr/min) 1:43   Net Positive (mL) 184 mL   Cycler PD Total UF (mL) 41 mL   Cycler Treatment Comments ccpd completed as planned. pt reports no issues during the night. vss, NADN. cycler disconnected according to P&P. RTF with primary RN.

## 2022-09-03 NOTE — NURSING
Lab called and I talked to Shilo. I was told that since there is a blood shortage there are new guidelines and criteria to be met for a patient to receive blood. He told me that this patient does not meet the criteria at this time. Dr. Houser notified. Will continue to monitor.

## 2022-09-03 NOTE — PLAN OF CARE
Initial PT eval completed. Patient ambulated 20 feet, Min A with RW. Recommend SNF vs home with HH based on progress.

## 2022-09-03 NOTE — SUBJECTIVE & OBJECTIVE
Subjective:     Interval History: brown bowel movement today. No vomiting. Tolerating full liquids. No abdominal pain.     Wife and son at bedside. Reviewed previous endoscopies with associated images on computer. Discussed my concerns over atypical ulcer in fundus with recurrent bleeding and appearance. Discussed therapies to stop bleeding including repeat endoscopies of visible vessels. Also, IR with embolization 8/31/22 of 2 separate left gastric arteries with gel-foam.     Recommend repeating EGD in 2 months and possible EUS if repeat endoscopy is abnormal.     Review of Systems  Objective:     Vital Signs (Most Recent):  Temp: 98.2 °F (36.8 °C) (09/03/22 1521)  Pulse: 77 (09/03/22 1521)  Resp: 20 (09/03/22 1521)  BP: (!) 108/55 (09/03/22 1521)  SpO2: 99 % (09/03/22 1521)   Vital Signs (24h Range):  Temp:  [98.1 °F (36.7 °C)-98.4 °F (36.9 °C)] 98.2 °F (36.8 °C)  Pulse:  [74-81] 77  Resp:  [17-20] 20  SpO2:  [94 %-100 %] 99 %  BP: (100-108)/(53-56) 108/55     Weight: 114.7 kg (252 lb 13.9 oz) (09/02/22 2000)  Body mass index is 34.29 kg/m².      Intake/Output Summary (Last 24 hours) at 9/3/2022 1649  Last data filed at 9/3/2022 1102  Gross per 24 hour   Intake 948.04 ml   Output 41 ml   Net 907.04 ml       Lines/Drains/Airways       Central Venous Catheter Line  Duration                  Hemodialysis Catheter -- days              Peripheral Intravenous Line  Duration                  Peripheral IV - Single Lumen 08/30/22 0052 20 G Anterior;Left;Proximal Forearm 4 days         Peripheral IV - Single Lumen 08/30/22 0714 20 G Anterior;Right Upper Arm 4 days                    Physical Exam  Vitals and nursing note reviewed.   Constitutional:       Appearance: He is obese. He is ill-appearing.   Abdominal:      General: There is no distension.      Palpations: Abdomen is soft.      Tenderness: There is no abdominal tenderness. There is no guarding or rebound.   Neurological:      General: No focal deficit present.       Mental Status: He is alert and oriented to person, place, and time. Mental status is at baseline.   Psychiatric:         Mood and Affect: Mood normal.         Behavior: Behavior normal.         Thought Content: Thought content normal.         Judgment: Judgment normal.       Significant Labs:  CBC:   Recent Labs   Lab 09/02/22  0949 09/02/22  1621 09/03/22  0413   WBC 4.04 3.50* 4.11   HGB 7.6* 7.7* 7.8*   HCT 24.1* 24.2* 24.2*   PLT 89* 90* 103*     CMP:   Recent Labs   Lab 09/03/22  0413      CALCIUM 6.9*   ALBUMIN 1.4*   PROT 3.4*      K 3.3*   CO2 20*      BUN 90*   CREATININE 6.1*   ALKPHOS 52*   ALT 10   AST 14   BILITOT 0.2     Coagulation: No results for input(s): PT, INR, APTT in the last 48 hours.      Significant Imaging:  Imaging results within the past 24 hours have been reviewed.

## 2022-09-03 NOTE — PT/OT/SLP EVAL
Physical Therapy Evaluation    Patient Name:  Alon Adamson   MRN:  [id number]    Recommendations:     Discharge Recommendations:  nursing facility, skilled, home health PT   Discharge Equipment Recommendations: none   Barriers to discharge: None    Assessment:     Alon Adamson is a 67 y.o. male admitted with a medical diagnosis of Gastrointestinal hemorrhage associated with gastric ulcer.  He presents with the following impairments/functional limitations:  impaired balance, impaired coordination, impaired endurance, impaired functional mobility, gait instability, impaired self care skills, decreased safety awareness.    Rehab Prognosis: Good; patient would benefit from acute skilled PT services to address these deficits and reach maximum level of function.    Recent Surgery: Procedure(s) (LRB):  EGD (ESOPHAGOGASTRODUODENOSCOPY) (N/A) 2 Days Post-Op    Plan:     During this hospitalization, patient to be seen 3 x/week to address the identified rehab impairments via gait training, therapeutic activities, therapeutic exercises and progress toward the following goals:    Plan of Care Expires:  09/17/22    Subjective     Chief Complaint: None stated  Patient/Family Comments/goals: Get stronger and return to prior level of function.  Pain/Comfort:  Pain Rating 1: 0/10    Patients cultural, spiritual, Ep[employer] conflicts given the current situation: no    Living Environment:  Patient lives with spouse in a double wide trailer with 5 steps and a hand rail to enter.  Prior to admission, patients level of function was Mod I with use of RW PRN, independent with ADLs.  Equipment used at home: wheelchair, walker, rolling, bedside commode.  DME owned (not currently used): wheelchair.  Upon discharge, patient will have assistance from family.    Objective:     Communicated with GUTIERREZ ABURTO prior to session.  Patient found supine with tele, P. IV upon PT entry to room.    General Precautions: Standard,     Orthopedic  Precautions: (None)   Braces: N/A  Respiratory Status: Room air    Exams:  Cognitive Exam:  Patient is oriented to Person, Place, Time, and Situation  RLE ROM: WFL  RLE Strength: WFL  LLE ROM: WFL  LLE Strength: WFL      Therapeutic Activities and Exercises:     Patient supine in bed upon PT entrance into room. PT provided education on role of PT and POC.    Patient completed:     Sup>sit: CGA with extended time to complete    Scooting: CGA to place feet on the floor    STS: Min A with bed elevated and verbal cues for hand placement for transfer.     Gait: Min A with RW with verbal cues and tactile cues for RW management and safety.     Chair transfer: Min A with RW    Therex: Education on therex program: [place] [facility] LA[facility]    AM-PAC 6 CLICK MOBILITY  Total Score:16     Patient left up in chair with all lines intact and call button in reach.    GOALS:   Multidisciplinary Problems       Physical Therapy Goals          Problem: Physical Therapy    Goal Priority Disciplines Outcome Goal Variances Interventions   Physical Therapy Goal     PT, PT/OT      Description: Patient will be seen a minimal of 3 out of 7 days a week.    LTG to be met by 03/17:    Bed mobility: Mod I  Transfers: SPV with RW  Gait: SPV with RW                       History:     Past Medical History:   Diagnosis Date    Allergy     Anticoagulant long-term use     Arthritis     Back pain     Cellulitis     Congenital heart disease     Hearing loss     right ear    Hepatic encephalopathy     HTN (hypertension)     Hypertension     diagnosed today (11/25/2015) and prescribed toprol    Leg edema     Nephrolithiasis     JACK (obstructive sleep apnea)     JACK (obstructive sleep apnea)     Seizures     per Pt last seizure 2018- controlled wit medication    Splenomegaly        Past Surgical History:   Procedure Laterality Date    APPENDECTOMY      Open    BACK SURGERY      CHOLECYSTECTOMY      laprascopic    COLONOSCOPY N/A 1/25/2018    Procedure: COLONOSCOPY;   Surgeon: Lashawn Myles MD;  Location: [facility] (2ND FLR);  Service: Endoscopy;  Laterality: N/A;    COLONOSCOPY N/A 12/20/2021    Procedure: COLONOSCOPY;  Surgeon: Jesus Grayson MD;  Location: [facility] (2ND FLR);  Service: Endoscopy;  Laterality: N/A;  start with a pediatric colonoscope and might need the slim pediatric colonoscope available.   priority case per Dr. Grayson-labwork am of procedure  RAPID COVID test coming for > 3 hrs away/ prep ins. [email or web address] / Pt requesting specific date for travel purposes    ESOPHAGOGASTRODUODENOSCOPY N/A 8/23/2022    Procedure: EGD (ESOPHAGOGASTRODUODENOSCOPY);  Surgeon: Nora Houser MD;  Location: [facility];  Service: Endoscopy;  Laterality: N/A;    ESOPHAGOGASTRODUODENOSCOPY N/A 8/30/2022    Procedure: EGD (ESOPHAGOGASTRODUODENOSCOPY);  Surgeon: Breanna Palma MD;  Location: [facility];  Service: Endoscopy;  Laterality: N/A;    ESOPHAGOGASTRODUODENOSCOPY N/A 9/1/2022    Procedure: EGD (ESOPHAGOGASTRODUODENOSCOPY);  Surgeon: Breanna Palma MD;  Location: [facility];  Service: Endoscopy;  Laterality: N/A;    FLUOROSCOPY N/A 8/30/2022    Procedure: FLUOROSCOPYembolization;  Surgeon: Elan Rodriguez Jr., MD;  Location: [facility] CATH LAB;  Service: General;  Laterality: N/A;    LIVER TRANSPLANT N/A 7/23/2018    Procedure: TRANSPLANT, LIVER;  Surgeon: Alma Joyce MD;  Location: [facility] OR 2ND FLR;  Service: Transplant;  Laterality: N/A;    LUMBAR DISCECTOMY      SPLENIC ARTERY EMBOLIZATION  04/08/2016    Dr Taveras    THORACENTESIS Left 8/3/2020    Procedure: Thoracentesis  U/S notified;  Surgeon: Augustine Rodriguez MD;  Location: [facility];  Service: Pulmonary;  Laterality: Left;    THORACENTESIS Left 4/14/2021    Procedure: Thoracentesis;  Surgeon: Augustine Rodriguez MD;  Location: [facility];  Service: Pulmonary;  Laterality: Left;    TONSILLECTOMY         Time Tracking:     PT Received On: 09/03/22  PT Start Time: 1100     PT Stop Time:  1124  PT Total Time (min): 24 min     Billable Minutes: Evaluation 12 and Gait Training 12      09/03/2022

## 2022-09-03 NOTE — PROGRESS NOTES
[facility] - Intensive Care ([facility])  Nephrology  Progress Note    Patient Name: Alon Adamson  MRN: [id number]  Admission Date: 8/29/2022  Hospital Length of Stay: 5 days  Attending Provider: Dexter Hale, *   Primary Care Physician: Bruno Oconnor NP  Principal Problem:Gastrointestinal hemorrhage associated with gastric ulcer    Consults  Subjective:     Interval History:  Patient was seen in his hospital room on PD cycler.  His line was kinked during the night.  PD cycler was draining.  Otherwise no difficulty with dialysis reported.    Review of systems:  No shortness of breath or chest discomfort.  No fevers or chills no swelling no nausea vomiting or diarrhea reported.    Review of patient's allergies indicates:   Allergen Reactions    Nsaids (non-steroidal anti-inflammatory drug)      D/t to liver disease.  Other reaction(s): Unknown    Penicillins Other (See Comments)     Tolerated pip-tazo in 8/2016 without issue  Tolerated cefepime 7/2018 without issue  Since childhood was told not to take it  Other reaction(s): Unknown     Current Facility-Administered Medications   Medication Frequency    0.9%  NaCl infusion (for blood administration) Q24H PRN    aluminum-magnesium hydroxide-simethicone 200-200-20 mg/5 mL suspension 30 mL QID PRN    dextrose 10% bolus 125 mL PRN    glucagon (human recombinant) injection 1 mg PRN    glucose chewable tablet 16 g PRN    glucose chewable tablet 24 g PRN    insulin aspart U-100 pen 1-10 Units Q6H PRN    levothyroxine tablet 50 mcg Daily    melatonin tablet 6 mg Nightly PRN    mupirocin 2 % ointment BID    naloxone 0.4 mg/mL injection 0.02 mg PRN    ondansetron disintegrating tablet 8 mg Q8H PRN    ondansetron injection 4 mg Q6H PRN    pantoprazole 40 mg in  mL infusion (ready to mix system) Continuous    promethazine (PHENERGAN) 12.5 mg in dextrose 5 % 50 mL IVPB Q6H PRN    simethicone chewable tablet 80 mg QID PRN    sodium chloride 0.9% flush 10 mL Q8H  PRN    sucralfate 100 mg/mL suspension 1 g Q6H    tacrolimus capsule 1 mg Daily PM    tacrolimus capsule 2 mg Daily AM       Objective:     Vital Signs (Most Recent):  Temp: 98.4 °F (36.9 °C) (09/03/22 0727)  Pulse: 80 (09/03/22 0727)  Resp: 20 (09/03/22 0727)  BP: (!) 105/53 (09/03/22 0727)  SpO2: (!) 94 % (09/03/22 0727)  O2 Device (Oxygen Therapy): room air (09/03/22 0727)   Vital Signs (24h Range):  Temp:  [98.1 °F (36.7 °C)-100 °F (37.8 °C)] 98.4 °F (36.9 °C)  Pulse:  [74-95] 80  Resp:  [17-27] 20  SpO2:  [94 %-100 %] 94 %  BP: ()/(50-58) 105/53     Weight: 114.7 kg (252 lb 13.9 oz) (09/02/22 2000)  Body mass index is 34.29 kg/m².  Body surface area is 2.41 meters squared.    I/O last 3 completed shifts:  In: 3466.1 [P.O.:600; I.V.:2866.1]  Out: 363 [Urine:50; Other:313]    Physical Exam  Constitutional:       General: He is not in acute distress.     Appearance: Normal appearance.   HENT:      Head: Normocephalic and atraumatic.   Eyes:      General: No scleral icterus.     Extraocular Movements: Extraocular movements intact.      Pupils: Pupils are equal, round, and reactive to light.   Cardiovascular:      Rate and Rhythm: Normal rate and regular rhythm.   Pulmonary:      Effort: Pulmonary effort is normal.      Breath sounds: Normal breath sounds.   Musculoskeletal:      Right lower leg: No edema.      Left lower leg: No edema.   Skin:     General: Skin is warm and dry.   Neurological:      General: No focal deficit present.      Mental Status: He is oriented to person, place, and time.   Psychiatric:         Mood and Affect: Mood normal.         Behavior: Behavior normal.       Significant Labs:sureBMP:   Recent Labs   Lab 09/03/22  0413         K 3.3*      CO2 20*   BUN 90*   CREATININE 6.1*   CALCIUM 6.9*   MG 1.8       CMP:   Recent Labs   Lab 09/03/22  0413      CALCIUM 6.9*   ALBUMIN 1.4*   PROT 3.4*      K 3.3*   CO2 20*      BUN 90*   CREATININE 6.1*    ALKPHOS 52*   ALT 10   AST 14   BILITOT 0.2       All labs within the past 24 hours have been reviewed.    Significant Imaging:  Reviewed      Assessment/Plan:     Active Diagnoses:    Diagnosis Date Noted POA    PRINCIPAL PROBLEM:  Gastrointestinal hemorrhage associated with gastric ulcer [K25.4] 08/30/2022 Yes    JACK (obstructive sleep apnea) [G47.33] 08/29/2022 Yes     Chronic    Peritonitis associated with peritoneal dialysis [T85.71XA] 08/29/2022 Yes    ESRD on peritoneal dialysis [N18.6, Z99.2] 08/23/2022 Not Applicable     Chronic    Status post liver transplant [Z94.4] 04/14/2021 Not Applicable     Chronic    HTN (hypertension) [I10] 03/03/2021 Yes    DM2 (diabetes mellitus, type 2) [E11.9] 03/03/2021 Yes     Chronic    Coronary artery disease involving native coronary artery of native heart without angina pectoris [I25.10] 03/16/2017 Yes     Chronic      Problems Resolved During this Admission:    Diagnosis Date Noted Date Resolved POA    Hematemesis [K92.0] 08/29/2022 09/01/2022 Yes    Shock circulatory [R57.9] 08/29/2022 09/02/2022 Yes       Assessment plan:    1. End-stage renal disease:  Underwent peritoneal dialysis last night.  As per above cycler was draining when I visit in the room.  His drain line was kinked.  Otherwise no other difficulties reported.    2. Metabolic acidosis:  Improved to 20.    3. Potassium slightly low at 3.3.  He may need oral replacement.    Thank you for your consult.     Moncho Lopez MD  Nephrology  [facility] - Intensive [facility] ([facility])

## 2022-09-03 NOTE — NURSING
Patient received from ICU via hosp bed accompanied by ICU RN.  AA&O x's 4.  Resp reg and unlabored. Tele intact.  Heart tones aud and reg.  IV Protonix infusing well via IV pump to L IV site.  Peritoneal cath intact w/ drsg dry and intact.  Patient denies c/o.  In NAD.  Oriented to surroundings.  Call light in reach.  Denies c/o.  In NAD. Will continue to monitor.

## 2022-09-03 NOTE — ASSESSMENT & PLAN NOTE
- S/p embolization by IR (08/30/22).   - repeat EGD with additional clip placed (09/01/22).  - unit of blood yesterday 9/1  - Hgb stable   - Protonix drip.   - full liquids.   - brown stool today

## 2022-09-03 NOTE — PROGRESS NOTES
[facility] Surg  Gastroenterology  Progress Note    Patient Name: Alon Adamson  MRN: [id number]  Admission Date: 8/29/2022  Hospital Length of Stay: 5 days  Code Status: Full Code   Attending Provider: Dexter Hale, *  Consulting Provider: Nora Houser MD  Primary Care Physician: Bruno Oconnor, IVY  Principal Problem: Gastrointestinal hemorrhage associated with gastric ulcer      Subjective:     Interval History: brown bowel movement today. No vomiting. Tolerating full liquids. No abdominal pain.     Wife and son at bedside. Reviewed previous endoscopies with associated images on computer. Discussed my concerns over atypical ulcer in fundus with recurrent bleeding and appearance. Discussed therapies to stop bleeding including repeat endoscopies of visible vessels. Also, IR with embolization 8/31/22 of 2 separate left gastric arteries with gel-foam.     Recommend repeating EGD in 2 months and possible EUS if repeat endoscopy is abnormal.     Review of Systems  Objective:     Vital Signs (Most Recent):  Temp: 98.2 °F (36.8 °C) (09/03/22 1521)  Pulse: 77 (09/03/22 1521)  Resp: 20 (09/03/22 1521)  BP: (!) 108/55 (09/03/22 1521)  SpO2: 99 % (09/03/22 1521)   Vital Signs (24h Range):  Temp:  [98.1 °F (36.7 °C)-98.4 °F (36.9 °C)] 98.2 °F (36.8 °C)  Pulse:  [74-81] 77  Resp:  [17-20] 20  SpO2:  [94 %-100 %] 99 %  BP: (100-108)/(53-56) 108/55     Weight: 114.7 kg (252 lb 13.9 oz) (09/02/22 2000)  Body mass index is 34.29 kg/m².      Intake/Output Summary (Last 24 hours) at 9/3/2022 1649  Last data filed at 9/3/2022 1102  Gross per 24 hour   Intake 948.04 ml   Output 41 ml   Net 907.04 ml       Lines/Drains/Airways       Central Venous Catheter Line  Duration                  Hemodialysis Catheter -- days              Peripheral Intravenous Line  Duration                  Peripheral IV - Single Lumen 08/30/22 0052 20 G Anterior;Left;Proximal Forearm 4 days         Peripheral IV - Single Lumen 08/30/22  0714 20 G Anterior;Right Upper Arm 4 days                    Physical Exam  Vitals and nursing note reviewed.   Constitutional:       Appearance: He is obese. He is ill-appearing.   Abdominal:      General: There is no distension.      Palpations: Abdomen is soft.      Tenderness: There is no abdominal tenderness. There is no guarding or rebound.   Neurological:      General: No focal deficit present.      Mental Status: He is alert and oriented to person, place, and time. Mental status is at baseline.   Psychiatric:         Mood and Affect: Mood normal.         Behavior: Behavior normal.         Thought Content: Thought content normal.         Judgment: Judgment normal.       Significant Labs:  CBC:   Recent Labs   Lab 09/02/22  0949 09/02/22  1621 09/03/22  0413   WBC 4.04 3.50* 4.11   HGB 7.6* 7.7* 7.8*   HCT 24.1* 24.2* 24.2*   PLT 89* 90* 103*     CMP:   Recent Labs   Lab 09/03/22  0413      CALCIUM 6.9*   ALBUMIN 1.4*   PROT 3.4*      K 3.3*   CO2 20*      BUN 90*   CREATININE 6.1*   ALKPHOS 52*   ALT 10   AST 14   BILITOT 0.2     Coagulation: No results for input(s): PT, INR, APTT in the last 48 hours.      Significant Imaging:  Imaging results within the past 24 hours have been reviewed.    Assessment/Plan:     * Gastrointestinal hemorrhage associated with gastric ulcer  - S/p embolization by IR (08/30/22).   - repeat EGD with additional clip placed (09/01/22).  - unit of blood yesterday 9/1  - Hgb stable   - Protonix drip.   - full liquids.   - brown stool today    Status post liver transplant  - s/p OLTx 07/2018 for etoh cirrhosis  - prograf monotherapy    ESRD on peritoneal dialysis  - peritoneal dialysis  - renal following  - metabolic acidosis improving      Recommendations:  1. ADAT  2. Continue Protonix 40mg BID  3. Switch to PO Protonix tomorrow  4. Repeat EGD in 2 months   5. If stable tomorrow may discharge tomorrow or Monday  6. Will arrange GI follow up       I will sign off.  Please contact us if you have any additional questions.    Nora Houser MD  Gastroenterology  [facility] S[facility]

## 2022-09-04 NOTE — ASSESSMENT & PLAN NOTE
Patient's FSGs are uncontrolled due to hyperglycemia on current medication regimen.  Last A1c reviewed-   Lab Results   Component Value Date    HGBA1C 5.6 08/23/2022     Most recent fingerstick glucose reviewed-   Recent Labs   Lab 09/03/22  1701 09/04/22  0037 09/04/22  0634 09/04/22  1107   POCTGLUCOSE 133* 148* 105 108     Current correctional scale  Low  Maintain anti-hyperglycemic dose as follows-   Antihyperglycemics (From admission, onward)    Start     Stop Route Frequency Ordered    08/31/22 1013  insulin aspart U-100 pen 1-10 Units         -- SubQ Every 6 hours PRN 08/31/22 0913        Hold Oral hypoglycemics while patient is in the hospital.

## 2022-09-04 NOTE — PLAN OF CARE
Problem: Adult Inpatient Plan of Care  Goal: Plan of Care Review  Outcome: Ongoing, Progressing     Problem: Diabetes Comorbidity  Goal: Blood Glucose Level Within Targeted Range  Outcome: Ongoing, Progressing     Problem: Infection  Goal: Absence of Infection Signs and Symptoms  Outcome: Ongoing, Progressing     Problem: Impaired Wound Healing  Goal: Optimal Wound Healing  Outcome: Ongoing, Progressing     Problem: Fall Injury Risk  Goal: Absence of Fall and Fall-Related Injury  Outcome: Ongoing, Progressing     Problem: Skin Injury Risk Increased  Goal: Skin Health and Integrity  Outcome: Ongoing, Progressing     Problem: Oral Intake Inadequate  Goal: Improved Oral Intake  Outcome: Ongoing, Progressing     Problem: Pain (Chronic Kidney Disease)  Goal: Acceptable Pain Control  Outcome: Ongoing, Progressing     Problem: Renal Function Impairment (Chronic Kidney Disease)  Goal: Minimize Renal Failure Effects  Outcome: Ongoing, Progressing    Patient remains free from injury. Safety precautions maintained. No s/s of acute distress. IVF infusing. Cardiac monitoring in place. Peritoneal dialysis continued. Pt education about care completed.

## 2022-09-04 NOTE — PROGRESS NOTES
Pharmacist Intervention IV to PO Note    Alon Adamson is a 67 y.o. male being treated with IV medication pantoprazole    Patient Data:    Vital Signs (Most Recent):  Temp: 98.1 °F (36.7 °C) (09/03/22 1926)  Pulse: 74 (09/03/22 2100)  Resp: 17 (09/03/22 1926)  BP: (!) 115/55 (09/03/22 1926)  SpO2: 96 % (09/03/22 1926)   Vital Signs (72h Range):  Temp:  [97.7 °F (36.5 °C)-100.8 °F (38.2 °C)]   Pulse:  []   Resp:  [17-38]   BP: ()/(44-62)   SpO2:  [94 %-100 %]   Arterial Line BP: ()/(38-76)      CBC:  Recent Labs   Lab 09/02/22  0949 09/02/22  1621 09/03/22  0413   WBC 4.04 3.50* 4.11   RBC 2.52* 2.59* 2.63*   HGB 7.6* 7.7* 7.8*   HCT 24.1* 24.2* 24.2*   PLT 89* 90* 103*   MCV 96 93 92   MCH 30.2 29.7 29.7   MCHC 31.5* 31.8* 32.2     CMP:     Recent Labs   Lab 09/01/22  0433 09/02/22  0153 09/03/22  0413   * 129* 103   CALCIUM 7.2* 7.2* 6.9*   ALBUMIN 1.6* 1.5* 1.4*   PROT 3.7* 3.8* 3.4*    140 137   K 3.7 4.0 3.3*   CO2 19* 16* 20*    114* 108   * 100* 90*   CREATININE 5.6* 6.2* 6.1*   ALKPHOS 39* 41* 52*   ALT 5* 6* 10   AST 10 17 14   BILITOT 0.3 0.3 0.2       Dietary Orders:  Diet Orders            Diet Dysphagia Mechanical Soft (IDDSI Level 5): Dysphagia 2 (Mechanical Soft Ground) starting at 09/03 1514            Based on the following criteria, this patient qualifies for intravenous to oral conversion:  [x] The patients gastrointestinal tract is functioning (tolerating medications via oral or enteral route for 24 hours and tolerating food or enteral feeds for 24 hours).  [x] The patient is hemodynamically stable for 24 hours (heart rate <100 beats per minute, systolic blood pressure >99 mm Hg, and respiratory rate <20 breaths per minute).  [x] The patient shows clinical improvement (afebrile for at least 24 hours and white blood cell count downtrending or normalized). Additionally, the patient must be non-neutropenic (absolute neutrophil count >500  cells/mm3).  [x] For antimicrobials, the patient has received IV therapy for at least 24 hours.    IV medication pantoprazole will be changed to oral medication pantoprazole    Pharmacist's Name: Cierra Calderon PharmD  Pharmacist's Extension: [phone]    Thank you for allowing us to participate in this patient's care.     Cierra Calderon PharmD 09/03/2022 10:19 PM

## 2022-09-04 NOTE — ASSESSMENT & PLAN NOTE
9/1:   H&H stable   Patient had drop in hemoglobin yesterday   CT abdomen pelvis did not reveal signs of active bleeding   GI plans for repeat EGD this afternoon     -S/p EGD :No old or fresh blood noted. No active bleeding. Exposed vessel noted in fundus of stomach with prior 3 clips in place. There was area of vessel that was exposed and not clipped off. Additional clip placed to this area. Vessel was then sprayed with water jet for a few seconds and no bleeding noted    -Cont PPI BID  -H/H stable

## 2022-09-04 NOTE — PLAN OF CARE
[facility] setup as ordered and in progress as ordered for Dr Lopez; reported as retaining fluid , prescription for PD changed per Dr Lopez; 8 hour tx, 2L exchanges 2.5% to alternate with 4.25% to promote fluid removal. No last fill, 4 cycles. RTF with primary RN.

## 2022-09-04 NOTE — PLAN OF CARE
[facility] - Med Surg  Discharge Final Note    Primary Care Provider: Bruno Oconnor NP    Expected Discharge Date: 9/4/2022    Final Discharge Note (most recent)       Final Note - 09/04/22 0911          Final Note    Assessment Type Final Discharge Note     Anticipated Discharge Disposition Home or Self Care        Post-Acute Status    Discharge Delays None known at this time                   No needs at the time of chart review. KM, MSW    Important Message from Medicare

## 2022-09-04 NOTE — CONSULTS
Pt decided on HH vs SNF. HH referral faxed ([phone]-fax) to [facility] in [place], [place] ([phone]-office). HH to review referral and f/u with CM on 9/5/2022. Pt rec'd HH service with [facility] approx 4 yrs ago. LONG, ALEJANDRO.

## 2022-09-04 NOTE — PROGRESS NOTES
[facility]  Progress Note    Patient Name: Alon Adamson  MRN: [id number]  Patient Class: IP- Inpatient   Admission Date: 8/29/2022  Length of Stay: 6 days  Attending Physician: Dexter Hale, *  Primary Care Provider: Bruno Oconnor NP        Subjective:     Principal Problem:Gastrointestinal hemorrhage associated with gastric ulcer        HPI:  Mr. Adamson started throwing up blood at home Saturday morning.  It started with low abdominal pain then nausea.  The vomit was dark and grainy mixed with bright red blood.  Threw up multiple times with large amounts of bloody liquid.  He the same problem a few days ago and was admitted and endoscopy showed blood clots in his stomach.  He also got very weak and could not stand up.  His home blood pressure monitor showed as low as 47/30.  He did not lose consciousness or fall.  No chest pain or shortness of breath.  Dizzy on standing.  He received three units of blood at [facility] before transfer here.      Overview/Hospital Course:  8/30: s/p Gelfoam embolization performed of left gastric artery this am. Cont levophed and ppi. Wean levo as tolerated. Repeat h/h. Transfuse hgb < 8.   8/31: EGD performed yesterday. See GI report. H/H stable. No further active bleeding noted. Will extubate patient. Will need CTA GI bleed should pt bleed again.   9/1:  Plans in place for EGD by GI this afternoon.  H&H stable.  Continue to transfuse as indicated.  CT abdomen pelvis performed yesterday afternoon did not show signs of active bleeding.  9/2 S/P EGD yesterday :No old or fresh blood noted. No active bleeding. Exposed vessel noted in fundus of stomach with prior 3 clips in place. There was area of vessel that was exposed and not clipped off. Additional clip placed to this area. Vessel was then sprayed with water jet for a few seconds and no bleeding noted . The  H/H sligly decreased . He is s/p multiple blood transfusion . He  is tolerating clear liquid diet . GI , Nephrology and general surgery on board .   9/3 he denies any new complaint for today . No obvious bloody  or black stool over the last 24 hrs .  The H/H has remain stable . NAEON . He did not received a blood transfusion yesterday .  9/4 NAEON . The H/H is stable  with no sign of acute bleeding . PT/OT rec SNF placement . CM consulted for placement .       Interval History:     Review of Systems   Constitutional:  Negative for fatigue and fever.   HENT:  Negative for sinus pressure.    Eyes:  Negative for visual disturbance.   Respiratory:  Negative for shortness of breath.    Cardiovascular:  Negative for chest pain.   Gastrointestinal:  Negative for nausea and vomiting.   Endocrine: Negative.    Genitourinary:  Negative for difficulty urinating.   Musculoskeletal:  Negative for back pain.   Skin:  Negative for rash.   Allergic/Immunologic: Negative.    Neurological:  Negative for headaches.   Psychiatric/Behavioral:  Negative for confusion.    Objective:     Vital Signs (Most Recent):  Temp: 98 °F (36.7 °C) (09/04/22 1117)  Pulse: 84 (09/04/22 1117)  Resp: 20 (09/04/22 1117)  BP: (!) 120/59 (09/04/22 1117)  SpO2: (!) 94 % (09/04/22 1117)   Vital Signs (24h Range):  Temp:  [98 °F (36.7 °C)-98.6 °F (37 °C)] 98 °F (36.7 °C)  Pulse:  [66-87] 84  Resp:  [17-20] 20  SpO2:  [94 %-99 %] 94 %  BP: (100-120)/(55-60) 120/59     Weight: 114.7 kg (252 lb 13.9 oz)  Body mass index is 34.29 kg/m².    Intake/Output Summary (Last 24 hours) at 9/4/2022 1129  Last data filed at 9/4/2022 0704  Gross per 24 hour   Intake --   Output 247 ml   Net -247 ml      Physical Exam  Vitals and nursing note reviewed.   Constitutional:       General: He is awake. He is not in acute distress.     Appearance: He is well-developed. He is obese. He is ill-appearing. He is not toxic-appearing or diaphoretic.      Interventions: He is not intubated.  HENT:      Head: Normocephalic and atraumatic.       Mouth/Throat:      Mouth: Mucous membranes are moist.   Eyes:      General: Lids are normal.      Pupils: Pupils are equal, round, and reactive to light.   Neck:      Trachea: Trachea normal.   Cardiovascular:      Rate and Rhythm: Normal rate and regular rhythm.      Pulses:           Radial pulses are 1+ on the right side and 1+ on the left side.        Dorsalis pedis pulses are 1+ on the right side and 1+ on the left side.      Heart sounds: Murmur heard.   Systolic murmur is present.   Pulmonary:      Effort: Pulmonary effort is normal. No tachypnea, accessory muscle usage or respiratory distress. He is not intubated.      Breath sounds: Normal breath sounds.   Chest:      Chest wall: No deformity or tenderness.   Abdominal:      General: Bowel sounds are increased. There is no distension.      Palpations: Abdomen is soft.      Tenderness: There is no abdominal tenderness.          Comments: Obese   Genitourinary:     Penis: Normal.    Musculoskeletal:         General: Normal range of motion.      Cervical back: Normal range of motion.      Right lower leg: No edema.      Left lower leg: No edema.      Right foot: No deformity.      Left foot: No deformity.   Lymphadenopathy:      Cervical: No cervical adenopathy.   Skin:     General: Skin is warm and dry.      Capillary Refill: Capillary refill takes less than 2 seconds.      Findings: No rash.   Neurological:      General: No focal deficit present.      Mental Status: He is alert and oriented to person, place, and time.   Psychiatric:         Attention and Perception: Attention normal.         Mood and Affect: Mood normal.         Speech: Speech normal.         Behavior: Behavior normal. Behavior is cooperative.         Thought Content: Thought content normal.         Cognition and Memory: Cognition normal.         Judgment: Judgment normal.       Significant Labs: All pertinent labs within the past 24 hours have been reviewed.      Significant Imaging: I have  reviewed all pertinent imaging results/findings within the past 24 hours.        Assessment/Plan:      * Gastrointestinal hemorrhage associated with gastric ulcer  9/1:   H&H stable   Patient had drop in hemoglobin yesterday   CT abdomen pelvis did not reveal signs of active bleeding   GI plans for repeat EGD this afternoon     -S/p EGD :No old or fresh blood noted. No active bleeding. Exposed vessel noted in fundus of stomach with prior 3 clips in place. There was area of vessel that was exposed and not clipped off. Additional clip placed to this area. Vessel was then sprayed with water jet for a few seconds and no bleeding noted    -Cont PPI BID  -H/H stable           Peritonitis associated with peritoneal dialysis  He was started on Intra-peritoneal Vancomycin on 26 August for a fluid culture growing S.hominis.  No signs or symptoms of infection.    JACK (obstructive sleep apnea)  Diagnosed but he does not use CPAP.    ESRD on peritoneal dialysis  Consultation to nephrology for ongoing renal replacement.  Plan to resume PD on 30 Aug.  No signs of volume overload and chemistries are within acceptable limits.  Follow serial renal function chemistries.  He does make urine.  Discussed with Dr. Mo.    8/30:   Nephrology consulted on case    Status post liver transplant  Check Tacrolimus level and continue 2 mg in the morning an 1 mg in the evening.  He is managed outpatient by Dr. García.  Checking PT/INR and LFTs.     8/30:  Continue tacrolimus    DM2 (diabetes mellitus, type 2)  Patient's FSGs are uncontrolled due to hyperglycemia on current medication regimen.  Last A1c reviewed-   Lab Results   Component Value Date    HGBA1C 5.6 08/23/2022     Most recent fingerstick glucose reviewed-   Recent Labs   Lab 09/03/22  1701 09/04/22  0037 09/04/22  0634 09/04/22  1107   POCTGLUCOSE 133* 148* 105 108     Current correctional scale  Low  Maintain anti-hyperglycemic dose as follows-   Antihyperglycemics (From admission,  onward)    Start     Stop Route Frequency Ordered    08/31/22 1013  insulin aspart U-100 pen 1-10 Units         -- SubQ Every 6 hours PRN 08/31/22 0913        Hold Oral hypoglycemics while patient is in the hospital.    HTN (hypertension)  Holding home medication for hypotension.  Resume when appropriate.    Coronary artery disease involving native coronary artery of native heart without angina pectoris  Holding Aspirin due to recent  GIB  Resume statin       VTE Risk Mitigation (From admission, onward)         Ordered     Reason for No Pharmacological VTE Prophylaxis  Once        Question:  Reasons:  Answer:  Active Bleeding    08/29/22 1906     IP VTE HIGH RISK PATIENT  Once         08/29/22 1906     Place sequential compression device  Until discontinued         08/29/22 1906                Discharge Planning   MARIO: 9/4/2022     Code Status: Full Code   Is the patient medically ready for discharge?:     Reason for patient still in hospital (select all that apply): Treatment  Discharge Plan A: Home with family   Discharge Delays: None known at this time              Dexter Hale MD  Department of Hospital Medicine   [facility] - Med Surg

## 2022-09-04 NOTE — SUBJECTIVE & OBJECTIVE
Interval History:     Review of Systems   Constitutional:  Negative for fatigue and fever.   HENT:  Negative for sinus pressure.    Eyes:  Negative for visual disturbance.   Respiratory:  Negative for shortness of breath.    Cardiovascular:  Negative for chest pain.   Gastrointestinal:  Negative for nausea and vomiting.   Endocrine: Negative.    Genitourinary:  Negative for difficulty urinating.   Musculoskeletal:  Negative for back pain.   Skin:  Negative for rash.   Allergic/Immunologic: Negative.    Neurological:  Negative for headaches.   Psychiatric/Behavioral:  Negative for confusion.    Objective:     Vital Signs (Most Recent):  Temp: 98 °F (36.7 °C) (09/04/22 1117)  Pulse: 84 (09/04/22 1117)  Resp: 20 (09/04/22 1117)  BP: (!) 120/59 (09/04/22 1117)  SpO2: (!) 94 % (09/04/22 1117)   Vital Signs (24h Range):  Temp:  [98 °F (36.7 °C)-98.6 °F (37 °C)] 98 °F (36.7 °C)  Pulse:  [66-87] 84  Resp:  [17-20] 20  SpO2:  [94 %-99 %] 94 %  BP: (100-120)/(55-60) 120/59     Weight: 114.7 kg (252 lb 13.9 oz)  Body mass index is 34.29 kg/m².    Intake/Output Summary (Last 24 hours) at 9/4/2022 1129  Last data filed at 9/4/2022 0704  Gross per 24 hour   Intake --   Output 247 ml   Net -247 ml      Physical Exam  Vitals and nursing note reviewed.   Constitutional:       General: He is awake. He is not in acute distress.     Appearance: He is well-developed. He is obese. He is ill-appearing. He is not toxic-appearing or diaphoretic.      Interventions: He is not intubated.  HENT:      Head: Normocephalic and atraumatic.      Mouth/Throat:      Mouth: Mucous membranes are moist.   Eyes:      General: Lids are normal.      Pupils: Pupils are equal, round, and reactive to light.   Neck:      Trachea: Trachea normal.   Cardiovascular:      Rate and Rhythm: Normal rate and regular rhythm.      Pulses:           Radial pulses are 1+ on the right side and 1+ on the left side.        Dorsalis pedis pulses are 1+ on the right side and  1+ on the left side.      Heart sounds: Murmur heard.   Systolic murmur is present.   Pulmonary:      Effort: Pulmonary effort is normal. No tachypnea, accessory muscle usage or respiratory distress. He is not intubated.      Breath sounds: Normal breath sounds.   Chest:      Chest wall: No deformity or tenderness.   Abdominal:      General: Bowel sounds are increased. There is no distension.      Palpations: Abdomen is soft.      Tenderness: There is no abdominal tenderness.          Comments: Obese   Genitourinary:     Penis: Normal.    Musculoskeletal:         General: Normal range of motion.      Cervical back: Normal range of motion.      Right lower leg: No edema.      Left lower leg: No edema.      Right foot: No deformity.      Left foot: No deformity.   Lymphadenopathy:      Cervical: No cervical adenopathy.   Skin:     General: Skin is warm and dry.      Capillary Refill: Capillary refill takes less than 2 seconds.      Findings: No rash.   Neurological:      General: No focal deficit present.      Mental Status: He is alert and oriented to person, place, and time.   Psychiatric:         Attention and Perception: Attention normal.         Mood and Affect: Mood normal.         Speech: Speech normal.         Behavior: Behavior normal. Behavior is cooperative.         Thought Content: Thought content normal.         Cognition and Memory: Cognition normal.         Judgment: Judgment normal.       Significant Labs: All pertinent labs within the past 24 hours have been reviewed.      Significant Imaging: I have reviewed all pertinent imaging results/findings within the past 24 hours.

## 2022-09-04 NOTE — NURSING
09/04/22 0704   Cycler Peritoneal Dialysis   Initial Drain Volume (mL) 121 mL   Effluent Appearance Clear   Number of Cycles 3   Fill Volume (mL) 2000 mL   Total Volume (mL) 6000 mL   Dwell Time (specify hr/min) 1:34   Cycler PD Total UF (mL) 247 mL   Cycler Treatment Comments Tx ended, Pt disconnected per P&P, No issues overnight.

## 2022-09-04 NOTE — PLAN OF CARE
LEAH met with pt, spouse, Vin and son, [place], at bedside to discuss post discharge needs. Pt/family requested HH vs SNF d/t pt receiving PD at home. LEAH communicated expressed goals to MD who was in agreement with plan for HH vs SNF. Pt rec'd HH services with [facility] approx 4 yrs ago.  referral faxed ([phone]-fax) to [facility] in [place], [place] ([phone]-office).  to review referral and f/u with CM on 9/5/2022.  RW, ANGELICSW.

## 2022-09-04 NOTE — PT/OT/SLP PROGRESS
Physical Therapy  Treatment    Alon Adamson   MRN: [id number]   Admitting Diagnosis: Gastrointestinal hemorrhage associated with gastric ulcer                          Billable Minutes:  Therapeutic Activity 25 min                     General Precautions: Standard,    Orthopedic Precautions:  (None)   Braces:    Respiratory Status: Room air    Spiritual, Cultural Beliefs, Confucianism Practices, Values that Affect Care: no    Subjective:  Communicated with Fadumo (nurse) prior to session.  Pt reports he is having groin pain but it was relieved well with  him standing and performing skin care with assistance from PT on providing new undergarments.         Objective:           Bed Mobility was SBA  Transfers:CGA for sit to stand and standing balance  Gait: pt ambulated with RW up to 20 ft x 2 with CGA to SBA for safety    Balance:   Static Sit: FAIR+: Able to take MINIMAL challenges from all directions  Dynamic Sit: FAIR+: Maintains balance through MINIMAL excursions of active trunk motion  Static Stand: FAIR+: Takes MINIMAL challenges from all directions  Dynamic stand: FAIR: Needs CONTACT GUARD during gait       Therapeutic Activities and Exercises:  Pt performed bed mobility with SBA then sit to stand with CGA; pt was very uncomfortable sitting so PT kept CGA for safety as he tended to sit far at the EOB. PT continued with working with pt on standing balance and then assisted pt with RW use for ambulating upto 20 ftx2 with SBA to CGA for safety and VC on pacing, RW management and step length to reduce fall hazards. Cues were give for upright posture and looking forward to challenge balance and return scanning for safety and balance.      AM-PAC 6 CLICK MOBILITY  How much help from another person does this patient currently need?   1 = Unable, Total/Dependent Assistance  2 = A lot, Maximum/Moderate Assistance  3 = A little, Minimum/Contact Guard/Supervision  4 = None, Modified [place]/Independent          AM-PAC Raw Score CMS G-Code Modifier Level of Impairment Assistance   6 % Total / Unable   7 - 9 CM 80 - 100% Maximal Assist   10 - 14 CL 60 - 80% Moderate Assist   15 - 19 CK 40 - 60% Moderate Assist   20 - 22 CJ 20 - 40% Minimal Assist   23 CI 1-20% SBA / CGA   24 CH 0% Independent/ Mod I     Patient left HOB elevated with all lines intact, call button in reach, bed alarm off, and nurse Fadumo notified.    Assessment:  Alon Adamson is a 67 y.o. male with a medical diagnosis of Gastrointestinal hemorrhage associated with gastric ulcer and presents with weakness and decreased mobility tolerances.    Rehab identified problem list/impairments:      Rehab potential is good.    Activity tolerance: Good    Discharge recommendations:       Barriers to discharge:      Equipment recommendations:       GOALS:   Multidisciplinary Problems       Physical Therapy Goals          Problem: Physical Therapy    Goal Priority Disciplines Outcome Goal Variances Interventions   Physical Therapy Goal     PT, PT/OT Ongoing, Progressing     Description: Patient will be seen a minimal of 3 out of 7 days a week.    LTG to be met by 03/17:    Bed mobility: Mod I  Transfers: SPV with RW  Gait: SPV with RW                       PLAN:    Patient to be seen 3 x/week  to address the above listed problems via gait training, therapeutic activities, therapeutic exercises  Plan of Care expires: 09/17/22  Plan of Care reviewed with: patient, spouse, son         09/04/2022

## 2022-09-04 NOTE — PLAN OF CARE
Pt AAOx4. VSS. Pt remained free of falls this shift. No complaints of pain. Discomfort R/T scrotal swelling relieved by repositioning. Medications administered as ordered. Pt is SR on monitor. Brief period of sinus bradycardia reported by MT at ~1245 with pt reporting being nauseous and lightheaded. MD notified, stat EKG obtained. Pt back in SR and reports relief of symptoms. PIV intact, flushed and SL. Hourly rounding completed. Pt denied POCT glucose testing this evening, requests no more finger sticks. MD aware. Pt instructed to call for assistance. POC reviewed. Pt verbalized understanding.       Problem: Adult Inpatient Plan of Care  Goal: Patient-Specific Goal (Individualized)  Outcome: Ongoing, Progressing     Problem: Infection  Goal: Absence of Infection Signs and Symptoms  Outcome: Ongoing, Progressing     Problem: Fall Injury Risk  Goal: Absence of Fall and Fall-Related Injury  Outcome: Ongoing, Progressing     Problem: Adjustment to Illness (Chronic Kidney Disease)  Goal: Optimal Coping with Chronic Illness  Outcome: Ongoing, Progressing

## 2022-09-04 NOTE — PROGRESS NOTES
EKG done and results called to patient's nurse, Fadumo. EKG placed in chart and transmitted through Epic.

## 2022-09-04 NOTE — PLAN OF CARE
Pt progressing but is a good candidate for skilled PT to advance. Pt ambulated with RW up to 20 ft with CGA for safety on bed mobility and transfers.

## 2022-09-04 NOTE — PROGRESS NOTES
[facility] - [facility] Care ([facility])  Nephrology  Progress Note    Patient Name: Alon Adamson  MRN: [id number]  Admission Date: 8/29/2022  Hospital Length of Stay: 6 days  Attending Provider: Dexter Hale, *   Primary Care Physician: Bruno Oconnor NP  Principal Problem:Gastrointestinal hemorrhage associated with gastric ulcer    Consults  Subjective:     Interval History:  Patient was seen in his hospital room.  Reports no difficulties with PD last night.  He is much more awake and interactive this morning.    Review of systems:  No shortness of breath or chest discomfort.  No fevers or chills, no nausea vomiting or diarrhea reported.  He relates that he has scrotal edema that has developed while he has been in the hospital.    Review of patient's allergies indicates:   Allergen Reactions    Nsaids (non-steroidal anti-inflammatory drug)      D/t to liver disease.  Other reaction(s): Unknown    Penicillins Other (See Comments)     Tolerated pip-tazo in 8/2016 without issue  Tolerated cefepime 7/2018 without issue  Since childhood was told not to take it  Other reaction(s): Unknown     Current Facility-Administered Medications   Medication Frequency    0.9%  NaCl infusion (for blood administration) Q24H PRN    aluminum-magnesium hydroxide-simethicone 200-200-20 mg/5 mL suspension 30 mL QID PRN    dextrose 10% bolus 125 mL PRN    furosemide tablet 80 mg Daily    glucagon (human recombinant) injection 1 mg PRN    glucose chewable tablet 16 g PRN    glucose chewable tablet 24 g PRN    insulin aspart U-100 pen 1-10 Units Q6H PRN    levothyroxine tablet 50 mcg Daily    melatonin tablet 6 mg Nightly PRN    midodrine tablet 2.5 mg BID WM    naloxone 0.4 mg/mL injection 0.02 mg PRN    ondansetron disintegrating tablet 8 mg Q8H PRN    ondansetron injection 4 mg Q6H PRN    pantoprazole EC tablet 40 mg BID    potassium chloride SA CR tablet 40 mEq Once    promethazine (PHENERGAN) 12.5 mg in dextrose 5 % 50 mL IVPB  Q6H PRN    simethicone chewable tablet 80 mg QID PRN    sodium chloride 0.9% flush 10 mL Q8H PRN    sucralfate 100 mg/mL suspension 1 g Q6H    tacrolimus capsule 1 mg Daily PM    tacrolimus capsule 2 mg Daily AM       Objective:     Vital Signs (Most Recent):  Temp: 98.2 °F (36.8 °C) (09/04/22 0704)  Pulse: 85 (09/04/22 0704)  Resp: 20 (09/04/22 0704)  BP: (!) 108/59 (09/04/22 0704)  SpO2: 95 % (09/04/22 0704)  O2 Device (Oxygen Therapy): room air (09/03/22 0727)   Vital Signs (24h Range):  Temp:  [98.1 °F (36.7 °C)-98.6 °F (37 °C)] 98.2 °F (36.8 °C)  Pulse:  [66-87] 85  Resp:  [17-20] 20  SpO2:  [94 %-99 %] 95 %  BP: (100-115)/(55-60) 108/59     Weight: 114.7 kg (252 lb 13.9 oz) (09/02/22 2000)  Body mass index is 34.29 kg/m².  Body surface area is 2.41 meters squared.    I/O last 3 completed shifts:  In: 347.9 [I.V.:347.9]  Out: 41 [Other:41]    Physical Exam  Constitutional:       General: He is not in acute distress.     Appearance: Normal appearance.   HENT:      Head: Normocephalic and atraumatic.   Eyes:      General: No scleral icterus.     Extraocular Movements: Extraocular movements intact.      Pupils: Pupils are equal, round, and reactive to light.   Cardiovascular:      Rate and Rhythm: Normal rate and regular rhythm.   Pulmonary:      Effort: Pulmonary effort is normal.      Breath sounds: Normal breath sounds.   Genitourinary:     Comments: Scrotal edema  Musculoskeletal:      Right lower leg: Edema present.      Left lower leg: Edema present.   Skin:     General: Skin is warm and dry.   Neurological:      General: No focal deficit present.      Mental Status: He is oriented to person, place, and time.   Psychiatric:         Mood and Affect: Mood normal.         Behavior: Behavior normal.       Significant Labs:sureBMP:   Recent Labs   Lab 09/04/22  0516   GLU 97      K 3.2*      CO2 20*   BUN 85*   CREATININE 6.1*   CALCIUM 6.9*   MG 1.8       CMP:   Recent Labs   Lab 09/04/22  0516   GLU  97   CALCIUM 6.9*   ALBUMIN 1.4*   PROT 3.6*      K 3.2*   CO2 20*      BUN 85*   CREATININE 6.1*   ALKPHOS 54*   ALT 12   AST 12   BILITOT 0.2       All labs within the past 24 hours have been reviewed.    Significant Imaging:  Reviewed      Assessment/Plan:     Active Diagnoses:    Diagnosis Date Noted POA    PRINCIPAL PROBLEM:  Gastrointestinal hemorrhage associated with gastric ulcer [K25.4] 08/30/2022 Yes    JACK (obstructive sleep apnea) [G47.33] 08/29/2022 Yes     Chronic    Peritonitis associated with peritoneal dialysis [T85.71XA] 08/29/2022 Yes    ESRD on peritoneal dialysis [N18.6, Z99.2] 08/23/2022 Not Applicable     Chronic    Status post liver transplant [Z94.4] 04/14/2021 Not Applicable     Chronic    HTN (hypertension) [I10] 03/03/2021 Yes    DM2 (diabetes mellitus, type 2) [E11.9] 03/03/2021 Yes     Chronic    Coronary artery disease involving native coronary artery of native heart without angina pectoris [I25.10] 03/16/2017 Yes     Chronic      Problems Resolved During this Admission:    Diagnosis Date Noted Date Resolved POA    Hematemesis [K92.0] 08/29/2022 09/01/2022 Yes    Shock circulatory [R57.9] 08/29/2022 09/02/2022 Yes       Assessment plan:    1. End-stage renal disease:  He reports no difficulty with PD last night.  Only 247 cc ultra filtrated.    2. Edema:  He states he does not usually has scrotal edema at home however he has developed edema since he has been in the hospital.  On review of his flow sheets it does not appear that we have been able to ultrafiltrate very much fluid.  His blood pressures are on the low side.    He takes 80 mg of Lasix twice a day at home.  Due to low blood pressure we will cautiously start 80 mg of Lasix once a day.  Additionally will start low-dose midodrine to see if we can help support blood pressure.    Will try 4.25 g % solution in alternation with 2.5 g% dialysis tonight.    2. Metabolic acidosis:  Improved to 20.    3. Potassium slightly  low at 3.2.  He was started on oral replacement yesterday.    Approximately 40 minutes was spent in face-to-face conversation and chart review.    Thank you for your consult.     Moncho Lopez MD  Nephrology  [facility] - Intensive [facility] ([facility])

## 2022-09-05 NOTE — PROGRESS NOTES
09/05/22 0747   Vitals   BP (!) 111/58   Temp 98.6 °F (37 °C)   Temp src Oral   Pulse 85   Resp 18   SpO2 97 %   Pulse Oximetry Type Intermittent        Peritoneal Dialysis Catheter 01/25/22 0600 Continuous cycling Double-cuffed Right lower abdomen   Placement Date/Time: 01/25/22 0600   Present Prior to Hospital Arrival?: Yes  Inserted by: MD  Dialysis Type: Continuous cycling  Catheter Type: Double-cuffed  Catheter Location: Right lower abdomen   Site Assessment Clean;Dry;Intact;No redness;No swelling   Dressing Intervention Integrity maintained   Status Deaccessed   Dressing Status Clean;Dry;Intact   Dressing Gauze   Drainage Description Other (Comment)  (none)   Securement secured to abdomen w/ adhesive device   Clamp Status clamped   Exchange completed   Peritoneal Dialysis   Exchange Type Cycler   Peritoneal Treatment Status Completed   Dianeal Solution Dextrose 2.5% in 6000 mL;Dextrose 4.25% in 6000 mL   Dianeal Additive Other (Comment)   Dianeal Antibiotic none   Cycler Peritoneal Dialysis   Initial Drain Volume (mL) 113 mL   Effluent Appearance Yellow   Number of Cycles 4   Fill Volume (mL) 2000 mL   Total Volume (mL) 8000 mL   Dwell Time (specify hr/min) 1:32   Average Dwell Time (specify hr/min) 1:32   Net Positive (mL) 1835 mL   Cycler PD Total UF (mL) 1722 mL   Cycler Treatment Comments CCpd completed as planned, vss, NADN noted. pt reports no issues with cycler during the night. pt d/c'd from cycler according to P&P. RTF.

## 2022-09-05 NOTE — PLAN OF CARE
Spoke to Lisa at [facility] in [place].  Faxed referral and home health order.  Office will look at in the am and start services.       09/05/22 1008   Post-Acute Status   Post-Acute Authorization Home Health   Home Health Status Set-up Complete/Auth obtained   Discharge Plan   Discharge Plan A Home Health

## 2022-09-05 NOTE — PROGRESS NOTES
CCPD in progress , plan of  care discussed with pt and made aware of Rx changes due to fluid retention. Noted with 2+ pitting edema to both LE's . SCD's now in place.

## 2022-09-05 NOTE — PLAN OF CARE
IV access discontinued per MD order. No s/s of any acute distress noted. Family @ bedside. Medication prescription given to pt. Discharge summary reviewed with pt .

## 2022-09-05 NOTE — PROGRESS NOTES
[facility] - [facility] Care ([facility])  Nephrology  Progress Note    Patient Name: Alon Adamson  MRN: [id number]  Admission Date: 8/29/2022  Hospital Length of Stay: 7 days  Attending Provider: Dexter Hale, *   Primary Care Physician: Bruno Oconnor NP  Principal Problem:Gastrointestinal hemorrhage associated with gastric ulcer    Consults  Subjective:     Interval History:  Patient was seen in his hospital room.  He was being taken off cycler by dialysis staff.  No difficulties with PD reported last night.    Review of systems:  No shortness of breath or chest discomfort.  No fevers or chills, no nausea vomiting or diarrhea reported.  Lower extremity swelling has improved slightly.    Review of patient's allergies indicates:   Allergen Reactions    Nsaids (non-steroidal anti-inflammatory drug)      D/t to liver disease.  Other reaction(s): Unknown    Penicillins Other (See Comments)     Tolerated pip-tazo in 8/2016 without issue  Tolerated cefepime 7/2018 without issue  Since childhood was told not to take it  Other reaction(s): Unknown     Current Facility-Administered Medications   Medication Frequency    0.9%  NaCl infusion (for blood administration) Q24H PRN    aluminum-magnesium hydroxide-simethicone 200-200-20 mg/5 mL suspension 30 mL QID PRN    atorvastatin tablet 40 mg Nightly    dextrose 10% bolus 125 mL PRN    furosemide tablet 80 mg Daily    glucagon (human recombinant) injection 1 mg PRN    glucose chewable tablet 16 g PRN    glucose chewable tablet 24 g PRN    insulin aspart U-100 pen 1-10 Units Q6H PRN    levothyroxine tablet 50 mcg Daily    melatonin tablet 6 mg Nightly PRN    midodrine tablet 2.5 mg BID WM    naloxone 0.4 mg/mL injection 0.02 mg PRN    ondansetron disintegrating tablet 8 mg Q8H PRN    ondansetron injection 4 mg Q6H PRN    pantoprazole EC tablet 40 mg BID    promethazine (PHENERGAN) 12.5 mg in dextrose 5 % 50 mL IVPB Q6H PRN    simethicone chewable tablet 80 mg QID PRN     sodium chloride 0.9% flush 10 mL Q8H PRN    sucralfate 100 mg/mL suspension 1 g Q6H    tacrolimus capsule 1 mg Daily PM    tacrolimus capsule 2 mg Daily AM       Objective:     Vital Signs (Most Recent):  Temp: 98.6 °F (37 °C) (09/05/22 0747)  Pulse: 85 (09/05/22 0747)  Resp: 18 (09/05/22 0747)  BP: (!) 111/58 (09/05/22 0747)  SpO2: 97 % (09/05/22 0747)  O2 Device (Oxygen Therapy): room air (09/03/22 0727)   Vital Signs (24h Range):  Temp:  [97.8 °F (36.6 °C)-98.6 °F (37 °C)] 98.6 °F (37 °C)  Pulse:  [79-87] 85  Resp:  [16-20] 18  SpO2:  [94 %-99 %] 97 %  BP: (111-132)/(58-71) 111/58     Weight: 114.7 kg (252 lb 13.9 oz) (09/02/22 2000)  Body mass index is 34.29 kg/m².  Body surface area is 2.41 meters squared.    I/O last 3 completed shifts:  In: 10 [I.V.:10]  Out: 247 [Other:247]    Physical Exam  Constitutional:       General: He is not in acute distress.     Appearance: Normal appearance.   HENT:      Head: Normocephalic and atraumatic.   Eyes:      General: No scleral icterus.     Extraocular Movements: Extraocular movements intact.      Pupils: Pupils are equal, round, and reactive to light.   Cardiovascular:      Rate and Rhythm: Normal rate and regular rhythm.   Pulmonary:      Effort: Pulmonary effort is normal.      Breath sounds: Normal breath sounds.   Genitourinary:     Comments: Scrotal edema  Musculoskeletal:      Right lower leg: Edema present.      Left lower leg: Edema present.   Skin:     General: Skin is warm and dry.   Neurological:      General: No focal deficit present.      Mental Status: He is oriented to person, place, and time.   Psychiatric:         Mood and Affect: Mood normal.         Behavior: Behavior normal.       Significant Labs:sureBMP:   Recent Labs   Lab 09/05/22 0513   *      K 3.3*      CO2 22*   BUN 74*   CREATININE 5.9*   CALCIUM 7.0*   MG 1.7       CMP:   Recent Labs   Lab 09/05/22  0513   *   CALCIUM 7.0*   ALBUMIN 1.5*   PROT 3.6*       K 3.3*   CO2 22*      BUN 74*   CREATININE 5.9*   ALKPHOS 60   ALT 12   AST 9*   BILITOT 0.2       All labs within the past 24 hours have been reviewed.    Significant Imaging:  Reviewed      Assessment/Plan:     Active Diagnoses:    Diagnosis Date Noted POA    PRINCIPAL PROBLEM:  Gastrointestinal hemorrhage associated with gastric ulcer [K25.4] 08/30/2022 Yes    JACK (obstructive sleep apnea) [G47.33] 08/29/2022 Yes     Chronic    Peritonitis associated with peritoneal dialysis [T85.71XA] 08/29/2022 Yes    ESRD on peritoneal dialysis [N18.6, Z99.2] 08/23/2022 Not Applicable     Chronic    Status post liver transplant [Z94.4] 04/14/2021 Not Applicable     Chronic    HTN (hypertension) [I10] 03/03/2021 Yes    DM2 (diabetes mellitus, type 2) [E11.9] 03/03/2021 Yes     Chronic    Coronary artery disease involving native coronary artery of native heart without angina pectoris [I25.10] 03/16/2017 Yes     Chronic      Problems Resolved During this Admission:    Diagnosis Date Noted Date Resolved POA    Hematemesis [K92.0] 08/29/2022 09/01/2022 Yes    Shock circulatory [R57.9] 08/29/2022 09/02/2022 Yes       Assessment plan:    1. End-stage renal disease:  No difficulty with PD last night.  Proximally 1900 cc ultrafiltration.    2. Edema:  Some improvement in his edema.    2. Metabolic acidosis:  Improved to 22.    3. Potassium slightly low at 3.3.  He was started on oral replacement yesterday.    4. Hypotension:  Continues on low-dose midodrine.  Would suggest discharged home on midodrine for the near future.  This can be adjusted or discontinued as an outpatient.    Approximately 40 minutes was spent in face-to-face conversation, coordination care with other providers and chart review.    Thank you for your consult.     Moncho Lopez MD  Nephrology  [facility] - Intensive Care ([facility])

## 2022-09-05 NOTE — PLAN OF CARE
[facility] - Med Surg  Discharge Final Note    Primary Care Provider: Bruno Oconnor NP    Expected Discharge Date: 9/5/2022    Final Discharge Note (most recent)       Final Note - 09/05/22 1018          Final Note    Assessment Type Final Discharge Note     Anticipated Discharge Disposition Home-Health Care Svc        Post-Acute Status    Post-Acute Authorization Home Health     Home Health Status Set-up Complete/Auth obtained   [facility] [place]    Discharge Delays None known at this time                     Important Message from Medicare             Contact Info       [facility]   Specialty: Home Hospice, Respite Care    [facility] [facility] [address]   Phone: [phone]       Next Steps: Follow up    Instructions: Home Health: Agency will contact you tomorrow to schedule a visit.    Bruno Oconnor NP   Specialty: Internal Medicine   Relationship: PCP - General    [address] [place] [id number]   Phone: [phone]       Next Steps: Follow up in 1 week(s)

## 2022-09-05 NOTE — DISCHARGE SUMMARY
[facility] Medicine  Discharge Summary      Patient Name: Alon Adamson  MRN: [id number]  Patient Class: IP- Inpatient  Admission Date: 8/29/2022  Hospital Length of Stay: 7 days  Discharge Date and Time:  09/05/2022 11:49 AM  Attending Physician: Dexter Hale, *   Discharging Provider: Dexter Hale MD  Primary Care Provider: Bruno Oconnor NP      HPI:   Mr. Adamson started throwing up blood at home Saturday morning.  It started with low abdominal pain then nausea.  The vomit was dark and grainy mixed with bright red blood.  Threw up multiple times with large amounts of bloody liquid.  He the same problem a few days ago and was admitted and endoscopy showed blood clots in his stomach.  He also got very weak and could not stand up.  His home blood pressure monitor showed as low as 47/30.  He did not lose consciousness or fall.  No chest pain or shortness of breath.  Dizzy on standing.  He received three units of blood at [facility] before transfer here.      Procedure(s) (LRB):  EGD (ESOPHAGOGASTRODUODENOSCOPY) (N/A)      Hospital Course:   8/30: s/p Gelfoam embolization performed of left gastric artery this am. Cont levophed and ppi. Wean levo as tolerated. Repeat h/h. Transfuse hgb < 8.   8/31: EGD performed yesterday. See GI report. H/H stable. No further active bleeding noted. Will extubate patient. Will need CTA GI bleed should pt bleed again.   9/1:  Plans in place for EGD by GI this afternoon.  H&H stable.  Continue to transfuse as indicated.  CT abdomen pelvis performed yesterday afternoon did not show signs of active bleeding.  9/2 S/P EGD yesterday :No old or fresh blood noted. No active bleeding. Exposed vessel noted in fundus of stomach with prior 3 clips in place. There was area of vessel that was exposed and not clipped off. Additional clip placed to this area. Vessel was then sprayed with water jet for a few seconds and no bleeding noted .  The  H/H sligly decreased . He is s/p multiple blood transfusion . He is tolerating clear liquid diet . GI , Nephrology and general surgery on board .   9/3 he denies any new complaint for today . No obvious bloody  or black stool over the last 24 hrs .  The H/H has remain stable . NAEON . He did not received a blood transfusion yesterday .  9/4 NAEON . The H/H is stable  with no sign of acute bleeding . PT/OT rec SNF placement . CM consulted for placement .   9/5 Pt was seen and examined at bedside . He was determined to be suitable for d/c   PT/OT rec SNF . Pt and wife decided to go home with HH . He is on PD and placement for SNF  has been difficult in the past . He had one episode of spontaneous bradycardia  which resolved immediately w/o any intervention . He has bene on sinus ryth w/o any problem .  Case was d/w family and pt in details . He will be d/c pn Protonix  40 mg po bid .  He was advised to f/u        Goals of Care Treatment Preferences:  Code Status: Full Code      Consults:   Consults (From admission, onward)        Status Ordering Provider     Inpatient consult to Social Work  Once        Provider:  (Not yet assigned)    Completed BRENNA LARSEN     Inpatient consult to General Surgery  Once        Provider:  Ricky Storm MD    Completed SHYANNE ACUNA     Inpatient consult to Registered Dietitian/Nutritionist  Once        Provider:  (Not yet assigned)    Completed DERRELL MURRIETA     Inpatient consult to Critical Care Medicine  Once        Provider:  Otoniel Kellogg MD    Completed DERRELL MURRIETA     Inpatient consult to Nephrology  Once        Provider:  Kevan Mo MD    Completed CRISTINA PRITCHETT     Inpatient consult to Gastroenterology  Once        Provider:  Breanna Palma MD    Completed CRISTINA PRITCHETT          No new Assessment & Plan notes have been filed under this hospital service since the last note was generated.  Service: Hospital Medicine    Final Active Diagnoses:     Diagnosis Date Noted POA    PRINCIPAL PROBLEM:  Gastrointestinal hemorrhage associated with gastric ulcer [K25.4] 08/30/2022 Yes    JACK (obstructive sleep apnea) [G47.33] 08/29/2022 Yes     Chronic    Peritonitis associated with peritoneal dialysis [T85.71XA] 08/29/2022 Yes    ESRD on peritoneal dialysis [N18.6, Z99.2] 08/23/2022 Not Applicable     Chronic    Status post liver transplant [Z94.4] 04/14/2021 Not Applicable     Chronic    HTN (hypertension) [I10] 03/03/2021 Yes    DM2 (diabetes mellitus, type 2) [E11.9] 03/03/2021 Yes     Chronic    Coronary artery disease involving native coronary artery of native heart without angina pectoris [I25.10] 03/16/2017 Yes     Chronic      Problems Resolved During this Admission:    Diagnosis Date Noted Date Resolved POA    Hematemesis [K92.0] 08/29/2022 09/01/2022 Yes    Shock circulatory [R57.9] 08/29/2022 09/02/2022 Yes       Discharged Condition: stable    Disposition: Home-Health Care [facility]    Follow Up:   Follow-up Information     [facility] Follow up.    Specialties: Home Hospice, Respite Care  Why: Home Health: Agency will contact you tomorrow to schedule a visit.  Contact information:  [facility] [address] [phone]           Bruno Oconnor NP Follow up in 1 week(s).    Specialty: Internal Medicine  Contact information:  [address] [place] [id number]  [phone]                       Patient Instructions:      Ambulatory referral/consult to Home Health   Standing Status: Future   Referral Priority: Routine Referral Type: Home Health   Referral Reason: Specialty Services Required   Requested Specialty: Home Health Services   Number of Visits Requested: 1     Diet renal     Notify your health care provider if you experience any of the following:  temperature >100.4     Notify your health care provider if you experience any of the following:  persistent nausea and vomiting or  diarrhea     Notify your health care provider if you experience any of the following:  severe uncontrolled pain     Notify your health care provider if you experience any of the following:  redness, tenderness, or signs of infection (pain, swelling, redness, odor or green/yellow discharge around incision site)     Notify your health care provider if you experience any of the following:  difficulty breathing or increased cough     Notify your health care provider if you experience any of the following:  severe persistent headache     Notify your health care provider if you experience any of the following:  worsening rash     Notify your health care provider if you experience any of the following:  persistent dizziness, light-headedness, or visual disturbances     Notify your health care provider if you experience any of the following:  increased confusion or weakness     Notify your health care provider if you experience any of the following:     Activity as tolerated       Significant Diagnostic Studies: Labs:   BMP:   Recent Labs   Lab 09/04/22 0516 09/05/22 0513   GLU 97 149*    137   K 3.2* 3.3*    107   CO2 20* 22*   BUN 85* 74*   CREATININE 6.1* 5.9*   CALCIUM 6.9* 7.0*   MG 1.8 1.7   , CMP   Recent Labs   Lab 09/04/22 0516 09/05/22 0513    137   K 3.2* 3.3*    107   CO2 20* 22*   GLU 97 149*   BUN 85* 74*   CREATININE 6.1* 5.9*   CALCIUM 6.9* 7.0*   PROT 3.6* 3.6*   ALBUMIN 1.4* 1.5*   BILITOT 0.2 0.2   ALKPHOS 54* 60   AST 12 9*   ALT 12 12   ANIONGAP 10 8    and CBC   Recent Labs   Lab 09/04/22  0727   WBC 4.49   HGB 8.1*   HCT 24.4*   *     Microbiology:   Blood Culture   Lab Results   Component Value Date    LABBLOO No growth after 5 days. 07/27/2018       Pending Diagnostic Studies:     None         Medications:  Reconciled Home Medications:      Medication List      START taking these medications    midodrine 2.5 MG Tab  Commonly known as: PROAMATINE  Take 1 tablet (2.5  mg total) by mouth 2 (two) times daily with meals.        CONTINUE taking these medications    atorvastatin 40 MG tablet  Commonly known as: LIPITOR  Take 40 mg by mouth nightly.     B complex-vitamin C-folic acid 0.8 mg Tab  Commonly known as: STAR-LAURY/NEPHRO-LAURY  Take 1 tablet by mouth once daily.     cyclobenzaprine 5 MG tablet  Commonly known as: FLEXERIL  Take 5 mg by mouth 3 (three) times daily as needed.     ferrous sulfate 324 mg (65 mg iron) Tbec  Take 1 tablet (324 mg total) by mouth once daily.     furosemide 40 MG tablet  Commonly known as: LASIX  Take 80 mg by mouth 2 (two) times a day.     gentamicin 0.1 % cream  Commonly known as: GARAMYCIN  Apply topically 2 (two) times daily.     levothyroxine 50 MCG tablet  Commonly known as: SYNTHROID  Take 50 mcg by mouth once daily.     ondansetron 4 MG tablet  Commonly known as: ZOFRAN  TAKE 1 TABLET BY MOUTH EVERY 8 HOURS FOR 4 DAYS     pantoprazole 40 MG tablet  Commonly known as: PROTONIX  Take 1 tablet (40 mg total) by mouth 2 (two) times daily.     promethazine 25 MG tablet  Commonly known as: PHENERGAN  Take 25 mg by mouth 3 (three) times daily as needed.     sevelamer carbonate 800 mg Tab  Commonly known as: RENVELA  Take 1,600 mg by mouth 3 (three) times daily with meals.     sucralfate 1 gram tablet  Commonly known as: CARAFATE  Take 1 tablet (1 g total) by mouth 4 (four) times daily before meals and nightly. for 14 days     tacrolimus 1 MG Cap  Commonly known as: PROGRAF  Take 2 capsules (2 mg total) by mouth every morning AND 1 capsule (1 mg total) every evening.     VITAMIN D2 50,000 unit Cap  Generic drug: ergocalciferol  Take 50,000 Units by mouth every 7 days. (Saturdays)        STOP taking these medications    aspirin 81 MG EC tablet  Commonly known as: ECOTRIN            Indwelling Lines/Drains at time of discharge:   Lines/Drains/Airways     None                 Time spent on the discharge of patient:  nimo Hale  MD  Department of Hospital Medicine  '[facility] Surg

## 2022-09-05 NOTE — PLAN OF CARE
Problem: Adult Inpatient Plan of Care  Goal: Plan of Care Review  Outcome: Ongoing, Progressing     Problem: Diabetes Comorbidity  Goal: Blood Glucose Level Within Targeted Range  Outcome: Ongoing, Progressing     Problem: Infection  Goal: Absence of Infection Signs and Symptoms  Outcome: Ongoing, Progressing     Problem: Impaired Wound Healing  Goal: Optimal Wound Healing  Outcome: Ongoing, Progressing     Problem: Skin Injury Risk Increased  Goal: Skin Health and Integrity  Outcome: Ongoing, Progressing     Problem: Oral Intake Inadequate  Goal: Improved Oral Intake  Outcome: Ongoing, Progressing     Problem: Renal Function Impairment (Chronic Kidney Disease)  Goal: Minimize Renal Failure Effects  Outcome: Ongoing, Progressing    Patient remains free from injury. Safety precautions maintained. No s/s of acute distress. Peritoneal dialysis continued throughout shift. Pt refused blood glucose monitoring. Cardiac monitoring in place.  Pt education about care completed.

## 2022-09-05 NOTE — PROGRESS NOTES
09/04/22 1900   Peritoneal Dialysis   Exchange Type Cycler   Peritoneal Treatment Status Started   Dianeal Solution Dextrose 2.5% in 6000 mL;Dextrose 4.25% in 6000 mL;Low Calcium   Dianeal Additive Other (Comment)  (none)   Dianeal Antibiotic none   Cycler Peritoneal Dialysis   Effluent Appearance Lisa   Number of Cycles 4   Fill Volume (mL) 2000 mL   Total Volume (mL) 8000 mL   Dwell Time (specify hr/min) 1:34   Cycler Treatment Comments CCPD setup at the bedside as ordered and according to P&P, 8 hour tx, 2L exchanges, 90 min dwells, 4 cycles  alternating 2.5 % dianeal with 4.25% ; no last fill. RTF with primary RN as same.

## 2022-09-06 NOTE — PROGRESS NOTES
C3 nurse attempted to contact Alon Adamson  And wife for a TCC post hospital discharge follow up call. No answer. Left voicemail with callback information. The patient does not have a scheduled HOSFU appointment. Pt has non [facility] PCP; unable to route message

## 2022-09-06 NOTE — TELEPHONE ENCOUNTER
Sent pt a msg on my chart.   Pt wants to schedule repeat EGD in 2 mths.   Appt scheduled and sent all information to the pt on my chart.

## 2022-09-06 NOTE — TELEPHONE ENCOUNTER
----- Message from Nora Houser MD sent at 9/3/2022  5:24 PM CDT -----  Regarding: Repeat EGD in 2 months  This is the patient that I messaged you about last week but was rehospitalized with a recurrent GI bleed. He is being tomorrow.    History of  in the fundus with visible vessels both times requiring endoscopic management twice and IR embolization. He required blood transfusion and remained stable on PPI gtt later switched PPI BID. Please arrange repeat EGD in 2 months. He is traveling from [place]. Case request placed.     Thanks  AN

## 2022-09-06 NOTE — TELEPHONE ENCOUNTER
Called pt, no answer. Msg left x 1.   We can do repeat EGD on Monday 11/07/22 with Dr Vanessa.   Sent msg on [facility].

## 2022-09-07 NOTE — PROGRESS NOTES
3rd attempt for TCC Call; Left voicemail. Please call [phone] please leave first and last name and  for Taty. We will return your call.

## 2022-09-07 NOTE — PROGRESS NOTES
2nd attempt to make TCC Call. Left voicemail. Please call [phone] leave your first and last name and date of birth for Taty. I will return your call.

## 2022-09-20 NOTE — TELEPHONE ENCOUNTER
Returned Dr. German's call and advised him that Dr. García wasn't at this location today but did provide him with Dr. García's personal cell.

## 2022-09-20 NOTE — TELEPHONE ENCOUNTER
----- Message from Deborah Pineda sent at 9/20/2022 11:28 AM CDT -----  Contact: Richa ([facility])  Dr. German would like a call back at [phone] , in regards to consulting with Dr. García. He states that the pt has been in the hospital for the last 2 days, and they have been trying to get a call back.

## 2022-09-21 NOTE — PROGRESS NOTES
Spoke with pt who stated he was discharged from hospital yesterday but was re-admitted at [facility]. Discharge summary & H&P to be requested.

## 2022-09-22 NOTE — TELEPHONE ENCOUNTER
----- Message from Darrell Vaughn sent at 9/22/2022  1:03 PM CDT -----  Regarding: call back  Pt call to speak with Helen in regards to some question he has pt states hes been discharge from [facility] in [place]      Call

## 2022-09-27 NOTE — TELEPHONE ENCOUNTER
Message sent to patient via [facility]:    You are due for some follow up liver transplant labs. Please let me know when you can go. thanks.

## 2022-09-28 NOTE — TELEPHONE ENCOUNTER
----- Message from Miriam García MD sent at 9/28/2022  1:10 PM CDT -----  Let's get patient set up for transplant labs since he was discharged from the hospital and arrange a virtual visit for a Thursday morning.

## 2022-09-28 NOTE — TELEPHONE ENCOUNTER
Patient notified and instructed via [facility]:     sent me a message stating that she would like some labs drawn sooner since you have now been discharged if possible. Also : she will want to see you in clinic (on a Thursday) she said, she said it can be in person or a virtual visit. Thanks.

## 2022-09-29 NOTE — TELEPHONE ENCOUNTER
----- Message from Esteban Del Valle sent at 9/29/2022 12:56 PM CDT -----  Regarding: Speak with office  Contact: Alon Price is calling to speak with his coordinator Helen about getting liver labs done on Saturday.    [phone]

## 2022-09-29 NOTE — TELEPHONE ENCOUNTER
Message left for patient returning his call. Informed him that labs on a Saturday not preferred due to no one is in the office to receive results in case there is something wrong with results.

## 2022-09-29 NOTE — TELEPHONE ENCOUNTER
Message received from patient via [facility]:     Any Thursday morning will be fine. My son can bring me or help me with a virtual visit if appt is after 10 am.   I will get labs as soon as possible.

## 2022-10-03 NOTE — TELEPHONE ENCOUNTER
----- Message from Gayle Braden sent at 10/3/2022 11:35 AM CDT -----  Contact: [phone]  Calling regarding labs . Please call back at [phone] . Thanks/dj

## 2022-10-14 NOTE — TELEPHONE ENCOUNTER
Update received from patient via [email or web address]:    I was at [facility] in [place].  I received iv potassium and mag then allowed to come home on oral potassium for a week.

## 2022-10-17 NOTE — PROGRESS NOTES
"The patient location is: home  The chief complaint leading to consultation is: fatigue    Visit type: audiovisual    Face to Face time with patient: 26  45 minutes of total time spent on the encounter, which includes face to face time and non-face to face time preparing to see the patient (eg, review of tests), Obtaining and/or reviewing separately obtained history, Documenting clinical information in the electronic or other health record, Independently interpreting results (not separately reported) and communicating results to the patient/family/caregiver, or Care coordination (not separately reported).     Each patient to whom he or she provides medical services by telemedicine is:  (1) informed of the relationship between the physician and patient and the respective role of any other health care provider with respect to management of the patient; and (2) notified that he or she may decline to receive medical services by telemedicine and may withdraw from such care at any time.    Patient ID: Alon Adamson is a 67 y.o. male.    Chief Complaint: "still feeling weak"    HPI:  Patient is a 67 year old male who presents today as a new patient telemedicine visit for further evaluation and management of abnormal SPEP.  He has been referred to the hematology clinic by his pulmonologist Dr. DARBY Rodriguez who is following patient for h/o recurrent pleural effusions requiring chest tube placement and thoracentesis (last 4/2021)of transudate fluids negative for malignancy.  Suspected due to third spacing.  He is s/p liver transplant in 2018 due to ETOH liver cirrhosis and is currently on tacrolimus.  He is also on PD that was started on 1/2022    Colonoscopy 12/2021 COLON, CECUM, "POLYP", BIOPSY:   - Tubular adenoma, fragments of   - No evidence of high-grade dysplasia or malignancy (multiple deeper levels   examined)   2. COLON, ASCENDING, "POLYP", BIOPSY:   - Tubular adenoma, fragments of, with focal villous features "   - No evidence of high-grade dysplasia or malignancy (multiple deeper levels   examined)     Interval History:  10/17/2022  State that he is still feeling weak and gets tired fast.  Is using a walker to ambulate.  EGD 9/2/2022 An endoclip was found in the stomach. Normal examined duodenum.   Esophageal ulcer with no stigmata of recent bleeding. (Patient states the bleeding was in his stomach and not in his esophagus)    Also noted with normocytic anemia and thrombocytopenia.  - noted recent CT scan of abdomen showing splenic infarcts present.     Denies any melena or any unusual bleeding.          Social History     Socioeconomic History    Marital status:      Spouse name: Vin Adamson    Number of children: 1   Tobacco Use    Smoking status: Never    Smokeless tobacco: Former     Types: Chew     Quit date: 7/29/2018   Substance and Sexual Activity    Alcohol use: No     Alcohol/week: 0.0 standard drinks    Drug use: No    Sexual activity: Not Currently   Social History Narrative    Caregiver Wife/Son       Family History   Problem Relation Age of Onset    Melanoma Mother     Heart attack Neg Hx     Heart disease Neg Hx     Hypertension Neg Hx        Past Surgical History:   Procedure Laterality Date    APPENDECTOMY      Open    BACK SURGERY      CHOLECYSTECTOMY      laprascopic    COLONOSCOPY N/A 1/25/2018    Procedure: COLONOSCOPY;  Surgeon: Lashawn Myles MD;  Location: [address]);  Service: Endoscopy;  Laterality: N/A;    COLONOSCOPY N/A 12/20/2021    Procedure: COLONOSCOPY;  Surgeon: Jesus Grayson MD;  Location: [address]);  Service: Endoscopy;  Laterality: N/A;  start with a pediatric colonoscope and might need the slim pediatric colonoscope available.   priority case per Dr. Grayson-labwork am of procedure  RAPID COVID test coming for > 3 hrs away/ prep ins. [email or web address] / Pt requesting specific date for travel purposes    ESOPHAGOGASTRODUODENOSCOPY N/A 8/23/2022     Procedure: EGD (ESOPHAGOGASTRODUODENOSCOPY);  Surgeon: Nora Houser MD;  Location: [facility];  Service: Endoscopy;  Laterality: N/A;    ESOPHAGOGASTRODUODENOSCOPY N/A 8/30/2022    Procedure: EGD (ESOPHAGOGASTRODUODENOSCOPY);  Surgeon: Breanna Palma MD;  Location: [facility] ENDO;  Service: Endoscopy;  Laterality: N/A;    ESOPHAGOGASTRODUODENOSCOPY N/A 9/1/2022    Procedure: EGD (ESOPHAGOGASTRODUODENOSCOPY);  Surgeon: Breanna Palma MD;  Location: [facility] ENDO;  Service: Endoscopy;  Laterality: N/A;    FLUOROSCOPY N/A 8/30/2022    Procedure: FLUOROSCOPYembolization;  Surgeon: Elan Rodriguez Jr., MD;  Location: [facility] CATH LAB;  Service: General;  Laterality: N/A;    LIVER TRANSPLANT N/A 7/23/2018    Procedure: TRANSPLANT, LIVER;  Surgeon: Alma Joyce MD;  Location: [address];  Service: Transplant;  Laterality: N/A;    LUMBAR DISCECTOMY      SPLENIC ARTERY EMBOLIZATION  04/08/2016    Dr Taveras    THORACENTESIS Left 8/3/2020    Procedure: Thoracentesis  U/S notified;  Surgeon: Augustine Rodriguez MD;  Location: [facility];  Service: Pulmonary;  Laterality: Left;    THORACENTESIS Left 4/14/2021    Procedure: Thoracentesis;  Surgeon: Augustine Rodriguez MD;  Location: [facility];  Service: Pulmonary;  Laterality: Left;    TONSILLECTOMY         Past Medical History:   Diagnosis Date    Allergy     Anticoagulant long-term use     Arthritis     Back pain     Cellulitis     Congenital heart disease     Hearing loss     right ear    Hepatic encephalopathy     HTN (hypertension)     Hypertension     diagnosed today (11/25/2015) and prescribed toprol    Leg edema     Nephrolithiasis     JACK (obstructive sleep apnea)     JACK (obstructive sleep apnea)     Seizures     per Pt last seizure 2018- controlled wit medication    Splenomegaly        Review of Systems   Constitutional:  Positive for fatigue.   Musculoskeletal:         Using a walker to ambulate   Neurological:  Positive for weakness.        Medication List  with Changes/Refills   Current Medications    ATORVASTATIN (LIPITOR) 40 MG TABLET    Take 40 mg by mouth nightly.    B COMPLEX-VITAMIN C-FOLIC ACID (STAR-LAURY/NEPHRO-LAURY) 0.8 MG TAB    Take 1 tablet by mouth once daily.    CYCLOBENZAPRINE (FLEXERIL) 5 MG TABLET    Take 5 mg by mouth 3 (three) times daily as needed.    ERGOCALCIFEROL (VITAMIN D2) 50,000 UNIT CAP    Take 50,000 Units by mouth every 7 days. (Saturdays)    FERROUS SULFATE 324 MG (65 MG IRON) TBEC    Take 1 tablet (324 mg total) by mouth once daily.    FUROSEMIDE (LASIX) 40 MG TABLET    Take 80 mg by mouth 2 (two) times a day.    GENTAMICIN (GARAMYCIN) 0.1 % CREAM    Apply topically 2 (two) times daily.    LEVOTHYROXINE (SYNTHROID) 50 MCG TABLET    Take 50 mcg by mouth once daily.    MIDODRINE (PROAMATINE) 2.5 MG TAB    Take 1 tablet (2.5 mg total) by mouth 2 (two) times daily with meals.    ONDANSETRON (ZOFRAN) 4 MG TABLET    TAKE 1 TABLET BY MOUTH EVERY 8 HOURS FOR 4 DAYS    PANTOPRAZOLE (PROTONIX) 40 MG TABLET    Take 1 tablet (40 mg total) by mouth 2 (two) times daily.    PROMETHAZINE (PHENERGAN) 25 MG TABLET    Take 25 mg by mouth 3 (three) times daily as needed.    SEVELAMER CARBONATE (RENVELA) 800 MG TAB    Take 1,600 mg by mouth 3 (three) times daily with meals.    TACROLIMUS (PROGRAF) 1 MG CAP    Take 2 capsules (2 mg total) by mouth every morning AND 1 capsule (1 mg total) every evening.        Objective:   There were no vitals filed for this visit.    Physical Exam  Constitutional:       Appearance: Normal appearance.   Pulmonary:      Effort: Pulmonary effort is normal.   Neurological:      General: No focal deficit present.      Mental Status: He is alert and oriented to person, place, and time.   Psychiatric:         Mood and Affect: Mood normal.         Behavior: Behavior normal.         Thought Content: Thought content normal.         Judgment: Judgment normal.       Assessment:     Problem List Items Addressed This Visit           Renal/    ESRD on peritoneal dialysis (Chronic)    Relevant Orders    Comprehensive Metabolic Panel    CBC Auto Differential    Comprehensive Metabolic Panel       Hematology    Thrombocytopenia - Primary    Relevant Orders    US Abdomen Complete    CBC Auto Differential    CBC Auto Differential       Oncology    Anemia of chronic disease    Relevant Orders    CBC Auto Differential    CBC Auto Differential     Other Visit Diagnoses       Hepatic cirrhosis, unspecified hepatic cirrhosis type, unspecified whether ascites present        Relevant Orders    US Abdomen Complete    Comprehensive Metabolic Panel    Comprehensive Metabolic Panel    Splenic infarction        Relevant Orders    US Abdomen Complete    Comprehensive Metabolic Panel    Anemia, unspecified type        Relevant Orders    Comprehensive Metabolic Panel    Ferritin    Iron and TIBC    Vitamin B12    Folate    Haptoglobin    Lactate Dehydrogenase    CBC Auto Differential    Ferritin    Iron and TIBC    Other intestinal malabsorption        Relevant Orders    Vitamin B12    CBC Auto Differential    Comprehensive Metabolic Panel    Ferritin    Malabsorption due to intolerance, not elsewhere classified        Relevant Orders    Folate            Lab Results   Component Value Date    WBC 4.49 09/04/2022    RBC 2.71 (L) 09/04/2022    HGB 8.1 (L) 09/04/2022    HCT 24.4 (L) 09/04/2022    MCV 90 09/04/2022    MCH 29.9 09/04/2022    MCHC 33.2 09/04/2022    RDW 15.9 (H) 09/04/2022     (L) 09/04/2022    MPV 9.3 09/04/2022    GRAN 3.4 09/04/2022    GRAN 76.3 (H) 09/04/2022    LYMPH 0.3 (L) 09/04/2022    LYMPH 6.5 (L) 09/04/2022    MONO 0.7 09/04/2022    MONO 14.5 09/04/2022    EOS 0.1 09/04/2022    BASO 0.01 09/04/2022    EOSINOPHIL 1.6 09/04/2022    BASOPHIL 0.2 09/04/2022      Lab Results   Component Value Date     09/05/2022    K 3.3 (L) 09/05/2022     09/05/2022    CO2 22 (L) 09/05/2022    BUN 74 (H) 09/05/2022    CREATININE 5.9 (H)  09/05/2022    CALCIUM 7.0 (L) 09/05/2022    ANIONGAP 8 09/05/2022    ESTGFRAFRICA 16.1 (A) 03/03/2021    EGFRNONAA 14.0 (A) 03/03/2021     Lab Results   Component Value Date    ALT 12 09/05/2022    AST 9 (L) 09/05/2022     (H) 07/30/2018    ALKPHOS 60 09/05/2022    BILITOT 0.2 09/05/2022       Plan:   Thrombocytopenia  -     US Abdomen Complete; Future; Expected date: 10/17/2022  -     Cancel: CBC Auto Differential; Future; Expected date: 10/17/2022  -     CBC Auto Differential; Future; Expected date: 10/17/2022  -     CBC Auto Differential; Future; Expected date: 10/17/2022    Hepatic cirrhosis, unspecified hepatic cirrhosis type, unspecified whether ascites present  -     US Abdomen Complete; Future; Expected date: 10/17/2022  -     Cancel: CBC Auto Differential; Future; Expected date: 10/17/2022  -     Comprehensive Metabolic Panel; Future; Expected date: 10/17/2022  -     Comprehensive Metabolic Panel; Future; Expected date: 10/17/2022    Splenic infarction  -     US Abdomen Complete; Future; Expected date: 10/17/2022  -     Cancel: CBC Auto Differential; Future; Expected date: 10/17/2022  -     Comprehensive Metabolic Panel; Future; Expected date: 10/17/2022    Anemia, unspecified type  -     Cancel: CBC Auto Differential; Future; Expected date: 10/17/2022  -     Comprehensive Metabolic Panel; Future; Expected date: 10/17/2022  -     Ferritin; Future; Expected date: 10/17/2022  -     Iron and TIBC; Future; Expected date: 10/17/2022  -     Vitamin B12; Future; Expected date: 10/17/2022  -     Folate; Future; Expected date: 10/17/2022  -     Haptoglobin; Future; Expected date: 10/17/2022  -     Lactate Dehydrogenase; Future; Expected date: 10/17/2022  -     CBC Auto Differential; Future; Expected date: 10/17/2022  -     Ferritin; Future; Expected date: 10/17/2022  -     Iron and TIBC; Future; Expected date: 10/17/2022    ESRD on peritoneal dialysis  -     Cancel: CBC Auto Differential; Future; Expected  date: 10/17/2022  -     Comprehensive Metabolic Panel; Future; Expected date: 10/17/2022  -     CBC Auto Differential; Future; Expected date: 10/17/2022  -     Comprehensive Metabolic Panel; Future; Expected date: 10/17/2022    Anemia of chronic disease  -     Cancel: CBC Auto Differential; Future; Expected date: 10/17/2022  -     CBC Auto Differential; Future; Expected date: 10/17/2022  -     CBC Auto Differential; Future; Expected date: 10/17/2022    Other intestinal malabsorption  -     Vitamin B12; Future; Expected date: 10/17/2022  -     CBC Auto Differential; Future; Expected date: 10/17/2022  -     Comprehensive Metabolic Panel; Future; Expected date: 10/17/2022  -     Ferritin; Future; Expected date: 10/17/2022    Malabsorption due to intolerance, not elsewhere classified  -     Folate; Future; Expected date: 10/17/2022  Repeat labs cbc, cmp, iron studies, LDH, haptoglobin, B12, and folate - get abdominal US to re evaluate liver and spleen now at [facility] in [facility]     F/u in 1 month with labs prior cbc, cmp, iron studies with VV after to discuss results.  Patient would like to do labs and US at [facility] and would like orders mailed to his home      Collaborating Provider:  Dr. Cristofer Sosa    Thank You,  Yani Smith, FNP-C  Hematology Oncology

## 2022-10-18 NOTE — TELEPHONE ENCOUNTER
----- Message from Miriam García MD sent at 10/14/2022  5:12 PM CDT -----  Reviewed, nothing to do; repeat per routine

## 2022-11-16 NOTE — PROGRESS NOTES
Pulmonary Outpatient Follow Up Visit     Subjective:       Patient ID: Alon Adamson is a 67 y.o. male.    Chief Complaint: Pleural Effusion      HPI          65-year-old male patient presenting for 3 months    11/16/2022 patient started hemodialysis via a right chest HD access few weeks ago.  Previously on peritoneal dialysis since January 2022.      August 2022 admitted hospital with GI bleeding likely related to portal hypertension status post IR embolization.      He has had multiple thoracentesis in the past and at some point chest tube placement.      No evidence of malignancy on pleural fluid cytology.  Last thoracentesis done September 2022 PH glucose cell count and differential LDH not in favor of complicated effusion.      April 2021, 200 cc of lynne sanguinous fluid was obtained.    August 2020,  400 of dark lynne clear fluid was recovered      He started January 2022, PD peritoneal dialysis .    April 2022 chest tube to the left it drained about 2 L Dx w ? Rx empyema.  The fluid chemistry and cell count and differential and microbiology were reviewed.  Patient says he was admitted to the hospital in [place] with sepsis.    The cell count was neutrophilic, 90% neutrophils, WBC 38 28.    LDH was elevated to 1800.    Glucose 36.  Total protein 2.2.    Has history of liver transplant 2018 for ETOH liver cirrhosis, on tacrolimus       Tobacco dip her quit 2018.     Used to work in the [employer] department in [place].      Review of Systems   Constitutional:  Positive for weakness.   HENT:  Negative for nosebleeds.    Eyes:  Negative for redness.   Respiratory:  Positive for shortness of breath. Negative for choking.    Cardiovascular:  Positive for leg swelling. Negative for chest pain.   Genitourinary:  Negative for hematuria.   Endocrine: ?   Diabetes mellitus Negative for cold intolerance.    Musculoskeletal:  Positive for back pain and gait problem.    Skin:  Positive for rash.   Gastrointestinal:         Cirrhosis status post liver transplant.   Neurological:  Negative for syncope.        History of seizures   Hematological:  Negative for adenopathy.        Immunocompromised.   Psychiatric/Behavioral:  Negative for confusion.      Outpatient Encounter Medications as of 11/16/2022   Medication Sig Dispense Refill    atorvastatin (LIPITOR) 40 MG tablet Take 40 mg by mouth nightly.      B complex-vitamin C-folic acid (STAR-LAURY/NEPHRO-LAURY) 0.8 mg Tab Take 1 tablet by mouth once daily.      cyclobenzaprine (FLEXERIL) 5 MG tablet Take 5 mg by mouth 3 (three) times daily as needed.      ergocalciferol (ERGOCALCIFEROL) 50,000 unit Cap Take 50,000 Units by mouth every 7 days. (Saturdays)      ferrous sulfate 324 mg (65 mg iron) TbEC Take 1 tablet (324 mg total) by mouth once daily. 60 tablet 0    furosemide (LASIX) 40 MG tablet Take 80 mg by mouth 2 (two) times a day.      gentamicin (GARAMYCIN) 0.1 % cream Apply topically 2 (two) times daily.      levothyroxine (SYNTHROID) 50 MCG tablet Take 50 mcg by mouth once daily.      midodrine (PROAMATINE) 2.5 MG Tab Take 1 tablet (2.5 mg total) by mouth 2 (two) times daily with meals. 60 tablet 2    ondansetron (ZOFRAN) 4 MG tablet TAKE 1 TABLET BY MOUTH EVERY 8 HOURS FOR 4 DAYS      pantoprazole (PROTONIX) 40 MG tablet Take 1 tablet (40 mg total) by mouth 2 (two) times daily. 60 tablet 3    promethazine (PHENERGAN) 25 MG tablet Take 25 mg by mouth 3 (three) times daily as needed.      sevelamer carbonate (RENVELA) 800 mg Tab Take 1,600 mg by mouth 3 (three) times daily with meals.      tacrolimus (PROGRAF) 1 MG Cap Take 2 capsules (2 mg total) by mouth every morning AND 1 capsule (1 mg total) every evening. 90 capsule 11     No facility-administered encounter medications on file as of 11/16/2022.       Objective:     Vital Signs (Most Recent)  Vital Signs  Pulse: 102  Resp: 18  SpO2: 100 %  BP: 116/70  Height and  Weight  Height: 6' (182.9 cm)  Weight: 99.1 kg (218 lb 7.6 oz)  BSA (Calculated - sq m): 2.24 sq meters  BMI (Calculated): 29.6  Weight in (lb) to have BMI = 25: 183.9]  Wt Readings from Last 2 Encounters:   11/16/22 99.1 kg (218 lb 7.6 oz)   09/02/22 114.7 kg (252 lb 13.9 oz)       Physical Exam   Constitutional: He is oriented to person, place, and time. He appears well-developed and well-nourished.   HENT:   Head: Normocephalic.   Cardiovascular: Normal rate, regular rhythm and normal heart sounds.   Pulmonary/Chest: Normal expansion and effort normal. No stridor. No respiratory distress. He exhibits no tenderness.   Breath sounds decreased on the left   Abdominal: Soft.   Musculoskeletal:         General: No tenderness or deformity.      Cervical back: Neck supple.   Lymphadenopathy:     He has no cervical adenopathy.   Neurological: He is alert and oriented to person, place, and time. Gait normal.   Skin: Skin is warm. No cyanosis. Nails show no clubbing.   Psychiatric: He has a normal mood and affect. His behavior is normal. Judgment and thought content normal.   Nursing note and vitals reviewed.    Laboratory  Lab Results   Component Value Date    WBC 4.49 09/04/2022    RBC 2.71 (L) 09/04/2022    HGB 8.1 (L) 09/04/2022    HCT 24.4 (L) 09/04/2022    MCV 90 09/04/2022    MCH 29.9 09/04/2022    MCHC 33.2 09/04/2022    RDW 15.9 (H) 09/04/2022     (L) 09/04/2022    MPV 9.3 09/04/2022    GRAN 3.4 09/04/2022    GRAN 76.3 (H) 09/04/2022    LYMPH 0.3 (L) 09/04/2022    LYMPH 6.5 (L) 09/04/2022    MONO 0.7 09/04/2022    MONO 14.5 09/04/2022    EOS 0.1 09/04/2022    BASO 0.01 09/04/2022    EOSINOPHIL 1.6 09/04/2022    BASOPHIL 0.2 09/04/2022       BMP  Lab Results   Component Value Date     09/05/2022    K 3.3 (L) 09/05/2022     09/05/2022    CO2 22 (L) 09/05/2022    BUN 74 (H) 09/05/2022    CREATININE 5.9 (H) 09/05/2022    CALCIUM 7.0 (L) 09/05/2022    ANIONGAP 8 09/05/2022    ESTGFRAFRICA 16.1 (A)  03/03/2021    EGFRNONAA 14.0 (A) 03/03/2021    AST 9 (L) 09/05/2022    ALT 12 09/05/2022    PROT 3.6 (L) 09/05/2022       Lab Results   Component Value Date    BNP 96 07/29/2020    BNP 88 01/20/2018       Lab Results   Component Value Date    TSH 5.556 (H) 08/17/2018       No results found for: SEDRATE    No results found for: CRP  No results found for: IGE     No results found for: ASPERGILLUS  No results found for: AFUMIGATUSCL     No results found for: ACE      Latest Reference Range & Units 08/03/20 13:48 04/14/21 12:55 04/14/21 12:56   Fluid Color  Yellow  Red   Fluid Appearance  Clear  Hazy   WBC, Body Fluid /cu mm 46  34   Body Fluid Type  Pleural Fluid, Left  Thoracentesis Fluid   Segs, Fluid % 1  3   Lymphs, Fluid % 89  74   Monocytes/Macrophages, Fluid % 10  23   Glucose, Fluid Not established mg/dL 72 91    LD, Fluid Not established U/L 250 150    Body Fluid, Albumin See text g/dL  1.0    Body Fluid Source, Albumin   Thoracentesis Fluid    Body Fluid Source, Glucose  Pleural Fluid, Left Thoracentesis Fluid    Body Fluid Source, LDH  Pleural Fluid, Left Thoracentesis Fluid    Body Fluid Source, Total Protein  Pleural Fluid, Left Thoracentesis Fluid    Body Fluid, Protein Not established g/dL 2.3 1.6        Pleural fluid pathology negative for malignancy.      Diagnostic Results:        I have personally reviewed today the following studies:    CT abdomen pelvis August 31, 2022      Moderate left-sided pleural effusion with thick wall which could reflect a chronic effusion. Empyema is not excluded        Chest x-ray 07/15/2020     There is a large opacities seen within the left lung base that appears to be contiguous with the pleural space along the medial and lateral aspect of the left hemithorax and near the left lung apex.  Findings are consistent with a large left-sided pleural effusion that is primarily subpulmonic in location.  The heart is not enlarged. There appear to be some vascular coils  projecting over the upper abdomen best seen on the lateral film.        2D echo 2018     1 - Normal left ventricular systolic function (EF 55-60%).     2 - Normal left ventricular diastolic function.     3 - Normal right ventricular systolic function .     4 - Trivial mitral regurgitation.     5 - Trivial tricuspid regurgitation.     6 - IVC not well seen. However, most likely based on other findings central venous pressures are not elevated.         CT CHEST 4/2022    1.  Decreased size of the left empyema with pigtail catheter terminating within it. Air-fluid level, presumably due to catheter placement.   2.  Overlying lung consolidation with few small foci of cavitation, similar to prior.   3.  Fluid about the gastric cardia and small perihepatic ascites, similar to prior.       Assessment/Plan:   Pleural effusion on left  -     US Chest Mediastinum; Future; Expected date: 11/16/2022    SOBOE (shortness of breath on exertion)  -     Complete PFT without bronchodilator; Future  -     Stress test, pulmonary; Future    Liver replaced by transplant    Dependence on hemodialysis    Kidney transplant candidate     Recurrent left-sided pleural effusion likely related to 3rd spacing?  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      No evidence of malignancy.  Suspected infection and chest tube placement 1 time in April 2022 in [place].        In summary patient had a recurrent left-sided pleural effusion drained in 2020 and then in 2021 and in April 2022 he was admitted in [place] with suspicion diagnosis of empyema and chest tube placement.  LDH elevated glucose decreased but microbiology negative and WBC 3800 only in pleural fluid    His pleural fluid initially was lymphocytic transudative however in April 2022 looked exudate they have suspicion of a complicated effusion/empyema.    He is immunocompromised on tacrolimus for liver transplant due to ETOH use, he is on hemodialysis for CKD  stage 5 and being evaluated for kidney transplant.    At the moment his pleural effusion remains of unclear/uncertain etiology.  Will repeat ultrasound of the chest.      Can do pre op thoracentesis.        From respiratory standpoint no absolute contraindication to proceed with kidney transplant once planned.      I will fax my visit report today to Funmilayo Saravia nurse practitioner with kidney transplantation team       Follow up in about 3 months (around 2/16/2023).    This note was prepared using voice recognition system and is likely to have sound alike errors that may have been overlooked even after proof reading.  Please call me with any questions    Discussed diagnosis, its evaluation, treatment and usual course. All questions answered.      Augustine Rodriguez MD

## 2022-11-21 NOTE — PROGRESS NOTES
"The patient location is: dialysis clinic  The chief complaint leading to consultation is: lab discussion    Visit type: audiovisual    Face to Face time with patient: 25  45 minutes of total time spent on the encounter, which includes face to face time and non-face to face time preparing to see the patient (eg, review of tests), Obtaining and/or reviewing separately obtained history, Documenting clinical information in the electronic or other health record, Independently interpreting results (not separately reported) and communicating results to the patient/family/caregiver, or Care coordination (not separately reported).     Each patient to whom he or she provides medical services by telemedicine is:  (1) informed of the relationship between the physician and patient and the respective role of any other health care provider with respect to management of the patient; and (2) notified that he or she may decline to receive medical services by telemedicine and may withdraw from such care at any time.      Patient ID: Alon Adamson is a 67 y.o. male.    Chief Complaint: lab discussion    HPI:  Patient is a 67 year old male who presents today as a new patient telemedicine visit for further evaluation and management of abnormal SPEP.  He has been referred to the hematology clinic by his pulmonologist Dr. DARBY Rodriguez who is following patient for h/o recurrent pleural effusions requiring chest tube placement and thoracentesis (last 4/2021)of transudate fluids negative for malignancy.  Suspected due to third spacing.  He is s/p liver transplant in 2018 due to ETOH liver cirrhosis and is currently on tacrolimus.  He is also on PD that was started on 1/2022; however has switched to HD and is on 3rd week treatment now.       Colonoscopy 12/2021 COLON, CECUM, "POLYP", BIOPSY:   - Tubular adenoma, fragments of   - No evidence of high-grade dysplasia or malignancy (multiple deeper levels   examined)   2. COLON, ASCENDING, " ""POLYP", BIOPSY:   - Tubular adenoma, fragments of, with focal villous features   - No evidence of high-grade dysplasia or malignancy (multiple deeper levels   examined)      Interval History:  10/17/2022  State that he is still feeling weak and gets tired fast.  Is using a walker to ambulate.  EGD 9/2/2022 An endoclip was found in the stomach. Normal examined duodenum.   Esophageal ulcer with no stigmata of recent bleeding. (Patient states the bleeding was in his stomach and not in his esophagus)  Also noted with normocytic anemia and thrombocytopenia.  - noted recent CT scan of abdomen showing splenic infarcts present.    Denies any melena or any unusual bleeding    Interval History: 11/21/2022:  States that he is feeling OK.  Just finishing up HD at facility.  States that PD was not working adequately for him because not cleaning blood enough.  With chornic stable anemia d/t chronic disease/ESRD currently on dialysis and cirrhosis of liver.  Thrombocytopenia stable.  Referred for elevated free light chains.  Found to have polyclonal FLC elevation with normal ratio indicative of chronic disease.  LEESA showed no monoclonal paraproteinemia.           Social History     Socioeconomic History    Marital status:      Spouse name: Vin Adamson    Number of children: 1   Tobacco Use    Smoking status: Never    Smokeless tobacco: Former     Types: Chew     Quit date: 7/29/2018   Substance and Sexual Activity    Alcohol use: No     Alcohol/week: 0.0 standard drinks    Drug use: No    Sexual activity: Not Currently   Social History Narrative    Caregiver Wife/Son       Family History   Problem Relation Age of Onset    Melanoma Mother     Heart attack Neg Hx     Heart disease Neg Hx     Hypertension Neg Hx        Past Surgical History:   Procedure Laterality Date    APPENDECTOMY      Open    BACK SURGERY      CHOLECYSTECTOMY      laprascopic    COLONOSCOPY N/A 1/25/2018    Procedure: COLONOSCOPY;  Surgeon: Lashawn ABURTO" MD Shar;  Location: [facility] (2ND FLR);  Service: Endoscopy;  Laterality: N/A;    COLONOSCOPY N/A 12/20/2021    Procedure: COLONOSCOPY;  Surgeon: Jesus Grayson MD;  Location: [facility] (2ND FLR);  Service: Endoscopy;  Laterality: N/A;  start with a pediatric colonoscope and might need the slim pediatric colonoscope available.   priority case per Dr. Grayson-labwork am of procedure  RAPID COVID test coming for > 3 hrs away/ prep ins. [email or web address] / Pt requesting specific date for travel purposes    ESOPHAGOGASTRODUODENOSCOPY N/A 8/23/2022    Procedure: EGD (ESOPHAGOGASTRODUODENOSCOPY);  Surgeon: Nora Houser MD;  Location: [facility];  Service: Endoscopy;  Laterality: N/A;    ESOPHAGOGASTRODUODENOSCOPY N/A 8/30/2022    Procedure: EGD (ESOPHAGOGASTRODUODENOSCOPY);  Surgeon: Breanna Palma MD;  Location: [facility];  Service: Endoscopy;  Laterality: N/A;    ESOPHAGOGASTRODUODENOSCOPY N/A 9/1/2022    Procedure: EGD (ESOPHAGOGASTRODUODENOSCOPY);  Surgeon: Breanna Palma MD;  Location: [facility];  Service: Endoscopy;  Laterality: N/A;    FLUOROSCOPY N/A 8/30/2022    Procedure: FLUOROSCOPYembolization;  Surgeon: Elan Rodriguez Jr., MD;  Location: [facility] CATH LAB;  Service: General;  Laterality: N/A;    LIVER TRANSPLANT N/A 7/23/2018    Procedure: TRANSPLANT, LIVER;  Surgeon: Alma Joyce MD;  Location: [facility] OR 2ND FLR;  Service: Transplant;  Laterality: N/A;    LUMBAR DISCECTOMY      SPLENIC ARTERY EMBOLIZATION  04/08/2016    Dr Taveras    THORACENTESIS Left 8/3/2020    Procedure: Thoracentesis  U/S notified;  Surgeon: Augustine Rodriguez MD;  Location: [facility];  Service: Pulmonary;  Laterality: Left;    THORACENTESIS Left 4/14/2021    Procedure: Thoracentesis;  Surgeon: Augustine Rodriguez MD;  Location: [facility] ENDO;  Service: Pulmonary;  Laterality: Left;    TONSILLECTOMY         Past Medical History:   Diagnosis Date    Allergy     Anticoagulant long-term use     Arthritis     Back pain      Cellulitis     Congenital heart disease     Hearing loss     right ear    Hepatic encephalopathy     HTN (hypertension)     Hypertension     diagnosed today (11/25/2015) and prescribed toprol    Leg edema     Nephrolithiasis     JACK (obstructive sleep apnea)     JACK (obstructive sleep apnea)     Seizures     per Pt last seizure 2018- controlled wit medication    Splenomegaly        Review of Systems   Constitutional:  Positive for fatigue.   HENT: Negative.     Eyes: Negative.    Respiratory: Negative.     Cardiovascular: Negative.    Gastrointestinal: Negative.    Endocrine: Negative.    Genitourinary: Negative.    Musculoskeletal: Negative.    Skin: Negative.    Allergic/Immunologic: Negative.    Neurological: Negative.    Hematological: Negative.    Psychiatric/Behavioral: Negative.          Medication List with Changes/Refills   Current Medications    ATORVASTATIN (LIPITOR) 40 MG TABLET    Take 40 mg by mouth nightly.    B COMPLEX-VITAMIN C-FOLIC ACID (STAR-LAURY/NEPHRO-LAURY) 0.8 MG TAB    Take 1 tablet by mouth once daily.    CYCLOBENZAPRINE (FLEXERIL) 5 MG TABLET    Take 5 mg by mouth 3 (three) times daily as needed.    ERGOCALCIFEROL (ERGOCALCIFEROL) 50,000 UNIT CAP    Take 50,000 Units by mouth every 7 days. (Saturdays)    FERROUS SULFATE 324 MG (65 MG IRON) TBEC    Take 1 tablet (324 mg total) by mouth once daily.    FUROSEMIDE (LASIX) 40 MG TABLET    Take 80 mg by mouth 2 (two) times a day.    GENTAMICIN (GARAMYCIN) 0.1 % CREAM    Apply topically 2 (two) times daily.    LEVOTHYROXINE (SYNTHROID) 50 MCG TABLET    Take 50 mcg by mouth once daily.    MIDODRINE (PROAMATINE) 2.5 MG TAB    Take 1 tablet (2.5 mg total) by mouth 2 (two) times daily with meals.    ONDANSETRON (ZOFRAN) 4 MG TABLET    TAKE 1 TABLET BY MOUTH EVERY 8 HOURS FOR 4 DAYS    PANTOPRAZOLE (PROTONIX) 40 MG TABLET    Take 1 tablet (40 mg total) by mouth 2 (two) times daily.    PROMETHAZINE (PHENERGAN) 25 MG TABLET    Take 25 mg by mouth 3  (three) times daily as needed.    SEVELAMER CARBONATE (RENVELA) 800 MG TAB    Take 1,600 mg by mouth 3 (three) times daily with meals.    TACROLIMUS (PROGRAF) 1 MG CAP    Take 2 capsules (2 mg total) by mouth every morning AND 1 capsule (1 mg total) every evening.        Objective:   There were no vitals filed for this visit.    Physical Exam  Constitutional:       Appearance: Normal appearance.   Pulmonary:      Effort: Pulmonary effort is normal.   Neurological:      General: No focal deficit present.      Mental Status: He is alert and oriented to person, place, and time.   Psychiatric:         Mood and Affect: Mood normal.         Behavior: Behavior normal.         Thought Content: Thought content normal.         Judgment: Judgment normal.       Assessment:     Problem List Items Addressed This Visit          Hematology    Thrombocytopenia - Primary    Relevant Orders    CBC Auto Differential    Folate    Vitamin B12    Comprehensive Metabolic Panel     Other Visit Diagnoses       Anemia, unspecified type        Relevant Orders    Folate    Vitamin B12    TSH    Comprehensive Metabolic Panel    ESRD on hemodialysis        Relevant Orders    CBC Auto Differential    Comprehensive Metabolic Panel    Hepatic cirrhosis, unspecified hepatic cirrhosis type, unspecified whether ascites present        Elevated serum immunoglobulin free light chain level        Anemia due to chronic kidney disease, on chronic dialysis                Lab Results   Component Value Date    WBC 4.49 09/04/2022    RBC 2.71 (L) 09/04/2022    HGB 8.1 (L) 09/04/2022    HCT 24.4 (L) 09/04/2022    MCV 90 09/04/2022    MCH 29.9 09/04/2022    MCHC 33.2 09/04/2022    RDW 15.9 (H) 09/04/2022     (L) 09/04/2022    MPV 9.3 09/04/2022    GRAN 3.4 09/04/2022    GRAN 76.3 (H) 09/04/2022    LYMPH 0.3 (L) 09/04/2022    LYMPH 6.5 (L) 09/04/2022    MONO 0.7 09/04/2022    MONO 14.5 09/04/2022    EOS 0.1 09/04/2022    BASO 0.01 09/04/2022    EOSINOPHIL  1.6 09/04/2022    BASOPHIL 0.2 09/04/2022      Lab Results   Component Value Date     09/05/2022    K 3.3 (L) 09/05/2022     09/05/2022    CO2 22 (L) 09/05/2022    BUN 74 (H) 09/05/2022    CREATININE 5.9 (H) 09/05/2022    CALCIUM 7.0 (L) 09/05/2022    ANIONGAP 8 09/05/2022    ESTGFRAFRICA 16.1 (A) 03/03/2021    EGFRNONAA 14.0 (A) 03/03/2021     Lab Results   Component Value Date    ALT 12 09/05/2022    AST 9 (L) 09/05/2022     (H) 07/30/2018    ALKPHOS 60 09/05/2022    BILITOT 0.2 09/05/2022       Plan:   Thrombocytopenia  -     CBC Auto Differential; Future; Expected date: 11/21/2022  -     Folate; Future; Expected date: 11/21/2022  -     Vitamin B12; Future; Expected date: 11/21/2022  -     Comprehensive Metabolic Panel; Future; Expected date: 11/21/2022    Anemia, unspecified type  -     Folate; Future; Expected date: 11/21/2022  -     Vitamin B12; Future; Expected date: 11/21/2022  -     TSH; Future; Expected date: 11/21/2022  -     Comprehensive Metabolic Panel; Future; Expected date: 11/21/2022    ESRD on hemodialysis  -     CBC Auto Differential; Future; Expected date: 11/21/2022  -     Comprehensive Metabolic Panel; Future; Expected date: 11/21/2022    Hepatic cirrhosis, unspecified hepatic cirrhosis type, unspecified whether ascites present    Elevated serum immunoglobulin free light chain level    Anemia due to chronic kidney disease, on chronic dialysis  Further workup anemia/thrombocytopenia with B12/folate/tsh levels.  Will f/u in 3 months.  Would like to do his labs at [facility] in [place], [place]. Will have a VV a couple days later to discuss results.       Med Onc Chart Routing      Follow up with physician    Follow up with JOANNE 3 months. Will f/u in 3 months.  Would like to do his labs at [facility] in [place], [place]. Will have a VV a couple days later to discuss results.   Infusion scheduling note none   Injection scheduling note none   Labs    Lab interval:  See above - will need requisition forms printed and mailed to patient for external labs   Imaging    Pharmacy appointment No pharmacy appointment needed      Other referrals No additional referrals needed          Collaborating Provider:  Dr. Cristofer Sosa    Thank You,  SOHA AbreuP-C  Hematology Oncology

## 2022-11-25 PROBLEM — K25.0 ACUTE GASTRIC ULCER WITH HEMORRHAGE: Status: ACTIVE | Noted: 2022-01-01

## 2022-11-25 NOTE — H&P
Short Stay Endoscopy History and Physical    PCP - Bruno Oconnor NP  Referring Physician - Nora Houser MD  [id number] [place] [id number]    Procedure - Endoscopy  ASA - per anesthesia  Mallampati - per anesthesia  History of Anesthesia problems - no  Family history Anesthesia problems -  no   Plan of anesthesia - General    HPI  67 y.o. male  Reason for procedure:   Gastric ulcer follow up    ROS:  Constitutional: No fevers, chills, No weight loss  CV: No chest pain  Pulm: No cough, No shortness of breath  GI: see HPI    Medical History:  has a past medical history of Allergy, Anticoagulant long-term use, Arthritis, Back pain, Cellulitis, Congenital heart disease, Hearing loss, Hepatic encephalopathy, HTN (hypertension), Hypertension, Leg edema, Nephrolithiasis, JACK (obstructive sleep apnea), JACK (obstructive sleep apnea), Seizures, and Splenomegaly.    Surgical History:  has a past surgical history that includes Cholecystectomy; Appendectomy; Tonsillectomy; Lumbar discectomy; Splenic artery embolization (04/08/2016); Back surgery; Colonoscopy (N/A, 1/25/2018); Liver transplant (N/A, 7/23/2018); Thoracentesis (Left, 8/3/2020); Thoracentesis (Left, 4/14/2021); Colonoscopy (N/A, 12/20/2021); Esophagogastroduodenoscopy (N/A, 8/23/2022); Fluoroscopy (N/A, 8/30/2022); Esophagogastroduodenoscopy (N/A, 8/30/2022); and Esophagogastroduodenoscopy (N/A, 9/1/2022).    Family History: family history includes Melanoma in his mother..    Social History:  reports that he has never smoked. He quit smokeless tobacco use about 4 years ago.  His smokeless tobacco use included chew. He reports that he does not drink alcohol and does not use drugs.    Review of patient's allergies indicates:   Allergen Reactions    Nsaids (non-steroidal anti-inflammatory drug)      D/t to liver disease.  Other reaction(s): Unknown    Penicillins Other (See Comments)     Tolerated pip-tazo in 8/2016 without issue  Tolerated  cefepime 7/2018 without issue  Since childhood was told not to take it  Other reaction(s): Unknown  Other reaction(s): Other (See Comments)  Tolerated pip-tazo in 8/2016 without issue  Tolerated cefepime 7/2018 without issue  Since childhood was told not to take it  Other reaction(s): Unknown       Medications:   Medications Prior to Admission   Medication Sig Dispense Refill Last Dose    atorvastatin (LIPITOR) 40 MG tablet Take 40 mg by mouth nightly.   Past Month    B complex-vitamin C-folic acid (STAR-LAURY/NEPHRO-LAURY) 0.8 mg Tab Take 1 tablet by mouth once daily.   11/24/2022    cyproheptadine (PERIACTIN) 4 mg tablet Take 4 mg by mouth 3 (three) times daily as needed.   11/24/2022    ergocalciferol (ERGOCALCIFEROL) 50,000 unit Cap Take 50,000 Units by mouth every 7 days. (Saturdays)   Past Week    gentamicin (GARAMYCIN) 0.1 % cream Apply topically 2 (two) times daily.   Past Week    midodrine (PROAMATINE) 2.5 MG Tab Take 1 tablet (2.5 mg total) by mouth 2 (two) times daily with meals. 60 tablet 2 11/25/2022    ondansetron (ZOFRAN) 4 MG tablet TAKE 1 TABLET BY MOUTH EVERY 8 HOURS FOR 4 DAYS   Past Week    pantoprazole (PROTONIX) 40 MG tablet Take 1 tablet (40 mg total) by mouth 2 (two) times daily. 60 tablet 3 11/24/2022    promethazine (PHENERGAN) 25 MG tablet Take 25 mg by mouth 3 (three) times daily as needed.   Past Month    sevelamer carbonate (RENVELA) 800 mg Tab Take 1,600 mg by mouth 3 (three) times daily with meals.   Past Month    tacrolimus (PROGRAF) 1 MG Cap Take 2 capsules (2 mg total) by mouth every morning AND 1 capsule (1 mg total) every evening. 90 capsule 11 11/25/2022    torsemide (DEMADEX) 100 MG Tab Take 100 mg by mouth once daily.   11/24/2022    cyclobenzaprine (FLEXERIL) 5 MG tablet Take 5 mg by mouth 3 (three) times daily as needed.   More than a month    ferrous sulfate 324 mg (65 mg iron) TbEC Take 1 tablet (324 mg total) by mouth once daily. 60 tablet 0 More than a month    furosemide  (LASIX) 40 MG tablet Take 80 mg by mouth 2 (two) times a day.   More than a month    levothyroxine (SYNTHROID) 50 MCG tablet Take 50 mcg by mouth once daily.   More than a month       Physical Exam:    Vital Signs:   Vitals:    11/25/22 0736   BP: 117/76   Pulse: 80   Resp: 16   Temp: 98.1 °F (36.7 °C)       General Appearance: Well appearing in no acute distress  Abdomen: Soft, non tender, non distended with normal bowel sounds, no masses    Labs:  Lab Results   Component Value Date    WBC 4.49 09/04/2022    HGB 8.1 (L) 09/04/2022    HCT 24.4 (L) 09/04/2022     (L) 09/04/2022    CHOL 193 03/03/2021    TRIG 138 03/03/2021    HDL 28 (L) 03/03/2021    ALT 12 09/05/2022    AST 9 (L) 09/05/2022     09/05/2022    K 3.3 (L) 09/05/2022     09/05/2022    CREATININE 5.9 (H) 09/05/2022    BUN 74 (H) 09/05/2022    CO2 22 (L) 09/05/2022    TSH 5.556 (H) 08/17/2018    PSA 1.6 06/03/2022    INR 1.0 09/01/2022    HGBA1C 5.6 08/23/2022       I have explained the risks and benefits of this endoscopic procedure to the patient including but not limited to bleeding, inflammation, infection, perforation, and death.    SEDATION PLAN: per anesthesia      History reviewed, vital signs satisfactory, cardiopulmonary status satisfactory, sedation options, risks and plans have been discussed with the patient  All their questions were answered and the patient agrees to the sedation procedures as planned and the patient is deemed an appropriate candidate for the sedation as planned.    Procedure explained to patient, informed consent obtained and placed in chart.        Brayan Goodwin MD

## 2022-11-25 NOTE — DISCHARGE SUMMARY
[facility] - Endoscopy (Hospital)  Discharge Note  Short Stay    Procedure(s) (LRB):  EGD (ESOPHAGOGASTRODUODENOSCOPY) (N/A)      OUTCOME: Patient tolerated treatment/procedure well without complication and is now ready for discharge.    DISPOSITION: Home or Self Care    FINAL DIAGNOSIS:  <principal problem not specified>    FOLLOWUP: With primary care provider    DISCHARGE INSTRUCTIONS:  No discharge procedures on file.

## 2022-11-25 NOTE — TRANSFER OF CARE
"Anesthesia Transfer of Care Note    Patient: Alon Adamson    Procedure(s) Performed: Procedure(s) (LRB):  EGD (ESOPHAGOGASTRODUODENOSCOPY) (N/A)    Patient location: PACU    Anesthesia Type: MAC    Transport from OR: Transported from OR on room air with adequate spontaneous ventilation    Post pain: adequate analgesia    Post assessment: no apparent anesthetic complications and tolerated procedure well    Post vital signs: stable    Level of consciousness: sedated    Nausea/Vomiting: no nausea/vomiting    Complications: none    Transfer of care protocol was followed      Last vitals:   Visit Vitals  /76 (BP Location: Left arm, Patient Position: Sitting)   Pulse 80   Temp 36.7 °C (98.1 °F) (Temporal)   Resp 16   Ht 6' 2" (1.88 m)   Wt 102.1 kg (225 lb)   SpO2 99%   BMI 28.89 kg/m²     "

## 2022-11-25 NOTE — ANESTHESIA POSTPROCEDURE EVALUATION
Anesthesia Post Evaluation    Patient: Alon Adamson    Procedure(s) Performed: Procedure(s) (LRB):  EGD (ESOPHAGOGASTRODUODENOSCOPY) (N/A)    Final Anesthesia Type: MAC      Patient location during evaluation: PACU  Patient participation: No - Unable to Participate, Sedation  Level of consciousness: sedated  Post-procedure vital signs: reviewed and stable  Pain management: adequate  Airway patency: patent    PONV status at discharge: No PONV  Anesthetic complications: no      Cardiovascular status: blood pressure returned to baseline and hemodynamically stable  Respiratory status: unassisted and spontaneous ventilation  Hydration status: euvolemic  Follow-up not needed.          Vitals Value Taken Time   BP 91/49 11/25/22 0904   Temp 36.7 °C (98.1 °F) 11/25/22 0904   Pulse 85 11/25/22 0904   Resp 20 11/25/22 0904   SpO2 98 % 11/25/22 0904         No case tracking events are documented in the log.      Pain/Ray Score: Ray Score: 10 (11/25/2022  7:41 AM)

## 2022-11-25 NOTE — ANESTHESIA PREPROCEDURE EVALUATION
11/25/2022  Alon Adamson is a 67 y.o., male.      Pre-op Assessment    I have reviewed the Patient Summary Reports.     I have reviewed the Nursing Notes. I have reviewed the NPO Status.   I have reviewed the Medications.     Review of Systems  Anesthesia Hx:  No problems with previous Anesthesia  Denies Family Hx of Anesthesia complications.   Denies Personal Hx of Anesthesia complications.   Social:  Former Smoker, No Alcohol Use    Hematology/Oncology:     Oncology Normal    -- Anemia:   EENT/Dental:   Hearing loss   Cardiovascular:   Exercise tolerance: poor Hypertension Denies MI. CAD   CABG/stent   Denies CHF. ECG has been reviewed. ekg 9/2022:  Sinus rhythm with 1st degree A-V block 81  Right bundle branch block   Abnormal ECG   When compared with ECG of 29-AUG-2022 20:03,   MA interval has increased    stress 6/2022:    Normal myocardial perfusion scan. There is no evidence of myocardial ischemia or infarction.    There is a mild intensity fixed perfusion abnormality in the inferior wall of the left ventricle, secondary to soft tissue attenuation.    There is a moderate intensity perfusion abnormality in the  wall of the left ventricle consistent with the RV insertion site.    The LVEF is not accurate due to poor software boundary tracking. The visually estimated ejection fraction is mildly reduced.    There is mild global hypokinesis at rest and following stress.    The EKG portion of this study is negative for ischemia.    The patient reported no chest pain during the stress test.    There were no arrhythmias during stress.    There are no prior studies for comparison.    Echo 6/2022:  ? The left ventricle is normal in size with concentric remodeling and normal systolic function.  ? Small posterolateral pericardial effusion.  ? The estimated ejection fraction is 60%.  ? Normal left  ventricular diastolic function.  ? Aortic sclerosis with trivial stenosis.  ? Mild tricuspid regurgitation.  ? There is abnormal septal wall motion most likely consistent with right bundle branch block.  ? Normal right ventricular size with normal right ventricular systolic function.  ? Normal central venous pressure (3 mmHg).  ? Insufficient TR envelope for PA pressure estimation. Measurement attempted.         Stent x3. Last stent placed approx 6 yrs ago   Pulmonary:   Denies COPD.  Denies Asthma. Sleep Apnea    Renal/:   Chronic Renal Disease, ESRD renal calculi ESRD on hd; t,th,s sched   Hepatic/GI:   PUD, Denies GERD. Denies Liver Disease.  Denies Hepatitis. Splenomegaly  Liver transplanted approx 4yrs ago   Musculoskeletal:   Arthritis     Neurological:   Denies CVA. Seizures Hepatic encephalopathy   Endocrine:   Diabetes Denies Hypothyroidism. Denies Hyperthyroidism.        Physical Exam  General: Well nourished    Airway:  Mallampati: II   Mouth Opening: Normal    Dental:Mult missing teeth. Remaining teeth chipped  Chest/Lungs:  Clear to auscultation, Normal Respiratory Rate    Heart:  Rate: Normal  Rhythm: Regular Rhythm        Anesthesia Plan  Type of Anesthesia, risks & benefits discussed:    Anesthesia Type: MAC  Intra-op Monitoring Plan: Standard ASA Monitors  Induction:  IV  Informed Consent: Informed consent signed with the Patient and all parties understand the risks and agree with anesthesia plan.  All questions answered.   ASA Score: 3  Day of Surgery Review of History & Physical: H&P Update referred to the surgeon/provider.    Ready For Surgery From Anesthesia Perspective.     .

## 2022-11-25 NOTE — PLAN OF CARE
Dr Goodwin came to bedside and discussed findings. NO N/V,  no abdominal pain, no GI bleeding, and vitals stable.  Pt discharged from unit.

## 2022-11-25 NOTE — PROVATION PATIENT INSTRUCTIONS
Discharge Summary/Instructions after an Endoscopic Procedure  Patient Name: Alon Adamson  Patient MRN: [id number]  Patient YOB: 1955  Friday, November 25, 2022 Brayan Goodwin MD  Dear patient,  As a result of recent federal legislation (The Federal Cures Act), you may   receive lab or pathology results from your procedure in your [facility]   account before your physician is able to contact you. Your physician or   their representative will relay the results to you with their   recommendations at their soonest availability.  Thank you,  RESTRICTIONS:  During your procedure today, you received medications for sedation.  These   medications may affect your judgment, balance and coordination.  Therefore,   for 24 hours, you have the following restrictions:   - DO NOT drive a car, operate machinery, make legal/financial decisions,   sign important papers or drink alcohol.    ACTIVITY:  Today: no heavy lifting, straining or running due to procedural   sedation/anesthesia.  The following day: return to full activity including work.  DIET:  Eat and drink normally unless instructed otherwise.     TREATMENT FOR COMMON SIDE EFFECTS:  - Mild abdominal pain, nausea, belching, bloating or excessive gas:  rest,   eat lightly and use a heating pad.  - Sore Throat: treat with throat lozenges and/or gargle with warm salt   water.  - Because air was used during the procedure, expelling large amounts of air   from your rectum or belching is normal.  - If a bowel prep was taken, you may not have a bowel movement for 1-3 days.    This is normal.  SYMPTOMS TO WATCH FOR AND REPORT TO YOUR PHYSICIAN:  1. Abdominal pain or bloating, other than gas cramps.  2. Chest pain.  3. Back pain.  4. Signs of infection such as: chills or fever occurring within 24 hours   after the procedure.  5. Rectal bleeding, which would show as bright red, maroon, or black stools.   (A tablespoon of blood from the rectum is not serious, especially  if   hemorrhoids are present.)  6. Vomiting.  7. Weakness or dizziness.  GO DIRECTLY TO THE NEAREST EMERGENCY ROOM IF YOU HAVE ANY OF THE FOLLOWING:      Difficulty breathing              Chills and/or fever over 101 F   Persistent vomiting and/or vomiting blood   Severe abdominal pain   Severe chest pain   Black, tarry stools   Bleeding- more than one tablespoon   Any other symptom or condition that you feel may need urgent attention  Your doctor recommends these additional instructions:  If any biopsies were taken, your doctors clinic will contact you in 1 to 2   weeks with any results.  - Discharge patient to home (via wheelchair).   - Resume previous diet.   - Continue present medications.   - Await pathology results.   - Return to referring physician.  For questions, problems or results please call your physician Brayan Goodwin MD at Work:  [phone]  If you have any questions about the above instructions, call the GI   department at [phone] or call the endoscopy unit at [phone]   from 7am until 3 pm.  [facility] [place], EMERGENCY ROOM PHONE NUMBER:   [phone]  IF A COMPLICATION OR EMERGENCY SITUATION ARISES AND YOU ARE UNABLE TO REACH   YOUR PHYSICIAN - GO DIRECTLY TO THE EMERGENCY ROOM.  I have read or have had read to me these discharge instructions for my   procedure and have received a written copy.  I understand these   instructions and will follow-up with my physician if I have any questions.     __________________________________       _____________________________________  Nurse Signature                                          Patient/Designated   Responsible Party Signature  MD Brayan Urbano MD  11/25/2022 9:01:52 AM  This report has been verified and signed electronically.  Dear patient,  As a result of recent federal legislation (The Federal Cures Act), you may   receive lab or pathology results from your procedure in  your [facility]   account before your physician is able to contact you. Your physician or   their representative will relay the results to you with their   recommendations at their soonest availability.  Thank you,  PROVATION

## 2022-12-05 PROBLEM — K25.4 GASTROINTESTINAL HEMORRHAGE ASSOCIATED WITH GASTRIC ULCER: Status: RESOLVED | Noted: 2022-01-01 | Resolved: 2022-01-01

## 2022-12-19 NOTE — PATIENT INSTRUCTIONS
Ensure pantoprazole is taken on an empty stomach.  Take at least 30 before meals or 2 hours after    Can take Pepcid 20mg daily with mid-day meals

## 2022-12-19 NOTE — PROGRESS NOTES
Clinic Follow Up:  [facility] Follow Up Note    Reason for Follow Up:  The primary encounter diagnosis was Acute gastric ulcer with hemorrhage. Diagnoses of Belching, Nausea and vomiting, unspecified vomiting type, Liver transplanted, Prophylactic immunotherapy, and ESRD on peritoneal dialysis were also pertinent to this visit.    PCP: Bruno Oconnor       HPI:  This is a 67 y.o. male here for follow up of the above  Pt is here with his son.   He previously has been seen by Dr. García, today is my first visit with him  He has recently had issues with upper GI bleeding due to gastric ulcer.  Most recent EGD showed healed without any other concerning findings.   He states that he has had no further episodes of GI bleeding.   Has continued with nausea and occasional vomiting, usually with meals.  Has also had increased belching.   He is taking PPI BID but not on an empty stomach.   Reports that Tums has helped with the symptoms in the past.   Recent labs are stable from an IS standpoint and being monitored by the transplant team       Review of Systems   Constitutional:  Positive for malaise/fatigue. Negative for chills, fever and weight loss.   Respiratory:  Negative for cough.    Cardiovascular:  Negative for chest pain.   Gastrointestinal:         Per HPI   Musculoskeletal:  Negative for myalgias.   Skin:  Negative for itching and rash.   Neurological:  Negative for headaches.   Psychiatric/Behavioral:  The patient is not nervous/anxious.      Medical History:  Past Medical History:   Diagnosis Date    Allergy     Anticoagulant long-term use     Arthritis     Back pain     Cellulitis     Congenital heart disease     Hearing loss     right ear    Hepatic encephalopathy     HTN (hypertension)     Hypertension     diagnosed today (11/25/2015) and prescribed toprol    Leg edema     Nephrolithiasis     JACK (obstructive sleep apnea)     JACK (obstructive sleep apnea)     Seizures     per Pt last  seizure 2018- controlled wit medication    Splenomegaly        Surgical History:   Past Surgical History:   Procedure Laterality Date    APPENDECTOMY      Open    BACK SURGERY      CHOLECYSTECTOMY      laprascopic    COLONOSCOPY N/A 1/25/2018    Procedure: COLONOSCOPY;  Surgeon: Lashawn Myles MD;  Location: [facility] (2ND FLR);  Service: Endoscopy;  Laterality: N/A;    COLONOSCOPY N/A 12/20/2021    Procedure: COLONOSCOPY;  Surgeon: Jesus Grayson MD;  Location: [facility] (2ND FLR);  Service: Endoscopy;  Laterality: N/A;  start with a pediatric colonoscope and might need the slim pediatric colonoscope available.   priority case per Dr. Grayson-labwork am of procedure  RAPID COVID test coming for > 3 hrs away/ prep ins. [email or web address] / Pt requesting specific date for travel purposes    ESOPHAGOGASTRODUODENOSCOPY N/A 8/23/2022    Procedure: EGD (ESOPHAGOGASTRODUODENOSCOPY);  Surgeon: Nora Houser MD;  Location: [facility] [place] [facility];  Service: Endoscopy;  Laterality: N/A;    ESOPHAGOGASTRODUODENOSCOPY N/A 8/30/2022    Procedure: EGD (ESOPHAGOGASTRODUODENOSCOPY);  Surgeon: Breanna Palma MD;  Location: [facility] [place] [facility];  Service: Endoscopy;  Laterality: N/A;    ESOPHAGOGASTRODUODENOSCOPY N/A 9/1/2022    Procedure: EGD (ESOPHAGOGASTRODUODENOSCOPY);  Surgeon: Breanna Palma MD;  Location: [facility] [place] [facility];  Service: Endoscopy;  Laterality: N/A;    ESOPHAGOGASTRODUODENOSCOPY N/A 11/25/2022    Procedure: EGD (ESOPHAGOGASTRODUODENOSCOPY);  Surgeon: Brayan Goodwin MD;  Location: [facility] ENDO;  Service: Endoscopy;  Laterality: N/A;    FLUOROSCOPY N/A 8/30/2022    Procedure: FLUOROSCOPYembolization;  Surgeon: Elan Rodriguez Jr., MD;  Location: [facility] CATH LAB;  Service: General;  Laterality: N/A;    LIVER TRANSPLANT N/A 7/23/2018    Procedure: TRANSPLANT, LIVER;  Surgeon: Alma Joyce MD;  Location: [facility] OR 2ND FLR;  Service: Transplant;  Laterality: N/A;    LUMBAR DISCECTOMY      SPLENIC ARTERY EMBOLIZATION   04/08/2016    Dr Taveras    THORACENTESIS Left 8/3/2020    Procedure: Thoracentesis  U/S notified;  Surgeon: Augustine Rodriguez MD;  Location: [facility] ENDO;  Service: Pulmonary;  Laterality: Left;    THORACENTESIS Left 4/14/2021    Procedure: Thoracentesis;  Surgeon: Augustine Rodriguez MD;  Location: [facility] ENDO;  Service: Pulmonary;  Laterality: Left;    TONSILLECTOMY         Family History:   Family History   Problem Relation Age of Onset    Melanoma Mother     Heart attack Neg Hx     Heart disease Neg Hx     Hypertension Neg Hx        Social History:   Social History     Tobacco Use    Smoking status: Never    Smokeless tobacco: Former     Types: Chew     Quit date: 7/29/2018   Substance Use Topics    Alcohol use: No     Alcohol/week: 0.0 standard drinks    Drug use: No       Allergies: Reviewed    Home Medications:  Current Outpatient Medications on File Prior to Visit   Medication Sig Dispense Refill    B complex-vitamin C-folic acid (STAR-LAURY/NEPHRO-LAURY) 0.8 mg Tab Take 1 tablet by mouth once daily.      cyproheptadine (PERIACTIN) 4 mg tablet Take 4 mg by mouth 3 (three) times daily as needed.      ergocalciferol (ERGOCALCIFEROL) 50,000 unit Cap Take 50,000 Units by mouth every 7 days. (Saturdays)      midodrine (PROAMATINE) 2.5 MG Tab Take 1 tablet (2.5 mg total) by mouth 2 (two) times daily with meals. 60 tablet 2    ondansetron (ZOFRAN) 4 MG tablet TAKE 1 TABLET BY MOUTH EVERY 8 HOURS FOR 4 DAYS      pantoprazole (PROTONIX) 40 MG tablet Take 1 tablet (40 mg total) by mouth 2 (two) times daily. 60 tablet 3    promethazine (PHENERGAN) 25 MG tablet Take 25 mg by mouth 3 (three) times daily as needed.      sevelamer carbonate (RENVELA) 800 mg Tab Take 1,600 mg by mouth 3 (three) times daily with meals.      tacrolimus (PROGRAF) 1 MG Cap Take 2 capsules (2 mg total) by mouth every morning AND 1 capsule (1 mg total) every evening. 90 capsule 11    torsemide (DEMADEX) 100 MG Tab Take 100 mg by mouth once daily.       "atorvastatin (LIPITOR) 40 MG tablet Take 40 mg by mouth nightly.      cyclobenzaprine (FLEXERIL) 5 MG tablet Take 5 mg by mouth 3 (three) times daily as needed.      ferrous sulfate 324 mg (65 mg iron) TbEC Take 1 tablet (324 mg total) by mouth once daily. (Patient not taking: Reported on 12/19/2022) 60 tablet 0    furosemide (LASIX) 40 MG tablet Take 80 mg by mouth 2 (two) times a day.      gentamicin (GARAMYCIN) 0.1 % cream Apply topically 2 (two) times daily.      levothyroxine (SYNTHROID) 50 MCG tablet Take 50 mcg by mouth once daily.       No current facility-administered medications on file prior to visit.       Physical Exam:  Vital Signs:  /74 (BP Location: Left arm, Patient Position: Sitting, BP Method: Medium (Manual))   Pulse 102   Ht 6' 2" (1.88 m)   Wt 95.2 kg (209 lb 14.1 oz)   BMI 26.95 kg/m²   Body mass index is 26.95 kg/m².  Physical Exam  Vitals reviewed.   Constitutional:       Appearance: He is well-developed. He is ill-appearing.   HENT:      Head: Normocephalic and atraumatic.   Eyes:      General: No scleral icterus.  Cardiovascular:      Rate and Rhythm: Normal rate.   Pulmonary:      Effort: Pulmonary effort is normal.   Abdominal:      General: There is no distension.      Tenderness: There is no abdominal tenderness.   Musculoskeletal:      Cervical back: Normal range of motion.      Comments: In wheelchair   Skin:     General: Skin is dry.   Neurological:      Mental Status: He is alert and oriented to person, place, and time.       Labs: Pertinent labs reviewed.      Assessment:  1. Acute gastric ulcer with hemorrhage    2. Belching    3. Nausea and vomiting, unspecified vomiting type    4. Liver transplanted    5. Prophylactic immunotherapy    6. ESRD on peritoneal dialysis        Recommendations:  Stable without any further GI bleeding.   - Instructions on PPI use given. Pt states understanding.   - can add Pepcid as needed up to BID for breakthrough indigestion  - instructed " to not drink with straws and to avoid carbonation.   - if the symptoms continue, can try a different PPI   - follow up with transplant team as planned.       Return to Clinic:  With Dr. García as previously planned  With me as needed for GI complaints.       Patient was seen in the liver transplant department at [facility].

## 2023-01-01 ENCOUNTER — PATIENT MESSAGE (OUTPATIENT)
Dept: TRANSPLANT | Facility: CLINIC | Age: 68
End: 2023-01-01
Payer: MEDICARE

## 2023-01-01 ENCOUNTER — TELEPHONE (OUTPATIENT)
Dept: HEMATOLOGY/ONCOLOGY | Facility: CLINIC | Age: 68
End: 2023-01-01
Payer: MEDICARE

## 2023-01-01 ENCOUNTER — TELEPHONE (OUTPATIENT)
Dept: TRANSPLANT | Facility: CLINIC | Age: 68
End: 2023-01-01
Payer: MEDICARE

## 2023-01-01 ENCOUNTER — OFFICE VISIT (OUTPATIENT)
Dept: PULMONOLOGY | Facility: CLINIC | Age: 68
End: 2023-01-01
Payer: MEDICARE

## 2023-01-01 ENCOUNTER — OFFICE VISIT (OUTPATIENT)
Dept: HEMATOLOGY/ONCOLOGY | Facility: CLINIC | Age: 68
End: 2023-01-01
Payer: MEDICARE

## 2023-01-01 ENCOUNTER — TELEPHONE (OUTPATIENT)
Dept: HEPATOLOGY | Facility: CLINIC | Age: 68
End: 2023-01-01
Payer: MEDICARE

## 2023-01-01 ENCOUNTER — PATIENT MESSAGE (OUTPATIENT)
Dept: HEMATOLOGY/ONCOLOGY | Facility: CLINIC | Age: 68
End: 2023-01-01
Payer: MEDICARE

## 2023-01-01 ENCOUNTER — CLINICAL SUPPORT (OUTPATIENT)
Dept: PULMONOLOGY | Facility: CLINIC | Age: 68
End: 2023-01-01
Payer: MEDICARE

## 2023-01-01 ENCOUNTER — TELEPHONE (OUTPATIENT)
Dept: PULMONOLOGY | Facility: CLINIC | Age: 68
End: 2023-01-01
Payer: MEDICARE

## 2023-01-01 VITALS
BODY MASS INDEX: 27.3 KG/M2 | RESPIRATION RATE: 19 BRPM | DIASTOLIC BLOOD PRESSURE: 65 MMHG | BODY MASS INDEX: 27.29 KG/M2 | HEART RATE: 103 BPM | OXYGEN SATURATION: 99 % | HEIGHT: 74 IN | WEIGHT: 212.75 LBS | SYSTOLIC BLOOD PRESSURE: 142 MMHG | WEIGHT: 212.63 LBS | HEIGHT: 74 IN

## 2023-01-01 DIAGNOSIS — J90 PLEURAL EFFUSION ON LEFT: ICD-10-CM

## 2023-01-01 DIAGNOSIS — J98.4 RESTRICTIVE LUNG DISEASE: Primary | ICD-10-CM

## 2023-01-01 DIAGNOSIS — Z94.4 LIVER REPLACED BY TRANSPLANT: Primary | ICD-10-CM

## 2023-01-01 DIAGNOSIS — R06.02 SOBOE (SHORTNESS OF BREATH ON EXERTION): ICD-10-CM

## 2023-01-01 DIAGNOSIS — Z99.2 DEPENDENCE ON HEMODIALYSIS: ICD-10-CM

## 2023-01-01 DIAGNOSIS — N18.6 ESRD ON HEMODIALYSIS: Primary | ICD-10-CM

## 2023-01-01 DIAGNOSIS — Z94.4 LIVER REPLACED BY TRANSPLANT: ICD-10-CM

## 2023-01-01 DIAGNOSIS — Z99.2 ESRD ON HEMODIALYSIS: Primary | ICD-10-CM

## 2023-01-01 DIAGNOSIS — Z76.82 ORGAN TRANSPLANT CANDIDATE: Primary | ICD-10-CM

## 2023-01-01 DIAGNOSIS — Z94.4 LIVER TRANSPLANTED: ICD-10-CM

## 2023-01-01 DIAGNOSIS — Z99.2 ANEMIA DUE TO CHRONIC KIDNEY DISEASE, ON CHRONIC DIALYSIS: ICD-10-CM

## 2023-01-01 DIAGNOSIS — N18.6 ANEMIA DUE TO CHRONIC KIDNEY DISEASE, ON CHRONIC DIALYSIS: ICD-10-CM

## 2023-01-01 DIAGNOSIS — E83.42 HYPOMAGNESEMIA: ICD-10-CM

## 2023-01-01 DIAGNOSIS — Z76.82 KIDNEY TRANSPLANT CANDIDATE: ICD-10-CM

## 2023-01-01 DIAGNOSIS — D63.1 ANEMIA DUE TO CHRONIC KIDNEY DISEASE, ON CHRONIC DIALYSIS: ICD-10-CM

## 2023-01-01 DIAGNOSIS — Z98.890 S/P THORACENTESIS: ICD-10-CM

## 2023-01-01 LAB
BRPFT: NORMAL
ERV LLN: -16448.78
ERV PRE REF: 44.3 %
ERV REF: 1.22
EXT ALBUMIN: 3.7
EXT ALBUMIN: 3.8
EXT ALBUMIN: 4.1
EXT ALBUMIN: 4.2
EXT ALKALINE PHOSPHATASE: 58
EXT ALKALINE PHOSPHATASE: 59
EXT ALKALINE PHOSPHATASE: 70
EXT ALKALINE PHOSPHATASE: 77
EXT ALT: 22
EXT ALT: 49
EXT ALT: 7
EXT ALT: 9
EXT AST: 11
EXT AST: 24
EXT AST: 29
EXT AST: 58
EXT BASOPHIL%: 0.8
EXT BASOPHIL%: 0.9
EXT BASOPHIL%: 1.2
EXT BILIRUBIN TOTAL: 0.39
EXT BILIRUBIN TOTAL: 0.46
EXT BILIRUBIN TOTAL: 0.5
EXT BILIRUBIN TOTAL: 0.6
EXT BUN: 33
EXT BUN: 36
EXT BUN: 37
EXT BUN: 38
EXT CALCIUM: 10.4
EXT CALCIUM: 10.7
EXT CALCIUM: 9.5
EXT CALCIUM: 9.6
EXT CHLORIDE: 101
EXT CHLORIDE: 103
EXT CHLORIDE: 103
EXT CHLORIDE: 98
EXT CO2: 26
EXT CO2: 28
EXT CO2: 30
EXT CO2: 34
EXT CREATININE: 4.64 MG/DL
EXT CREATININE: 4.69 MG/DL
EXT CREATININE: 4.7 MG/DL
EXT CREATININE: 5.66 MG/DL
EXT EGFR NO RACE VARIABLE: 13
EXT EOSINOPHIL%: 1
EXT EOSINOPHIL%: 1.9
EXT EOSINOPHIL%: 3
EXT EOSINOPHIL%: 3.8
EXT GFR MDRD AF AMER: 10
EXT GFR MDRD NON AF AMER: 13
EXT GFR MDRD NON AF AMER: 13
EXT GLUCOSE: 105
EXT GLUCOSE: 123
EXT GLUCOSE: 125
EXT GLUCOSE: 144
EXT HEMATOCRIT: 32.2
EXT HEMATOCRIT: 32.7
EXT HEMATOCRIT: 33.2
EXT HEMATOCRIT: 35.2
EXT HEMOGLOBIN: 10.7
EXT HEMOGLOBIN: 11
EXT HEMOGLOBIN: 11.5
EXT HEMOGLOBIN: 11.9
EXT LYMPH%: 10.7
EXT LYMPH%: 6.3
EXT LYMPH%: 9
EXT LYMPH%: 9.3
EXT MAGNESIUM: 1.9
EXT MAGNESIUM: 2.1
EXT MONOCYTES%: 10.2
EXT MONOCYTES%: 10.6
EXT MONOCYTES%: 10.9
EXT MONOCYTES%: 5
EXT PLATELETS: 134
EXT PLATELETS: 173
EXT PLATELETS: 178
EXT PLATELETS: 188
EXT POTASSIUM: 3.5
EXT POTASSIUM: 4.6
EXT POTASSIUM: 4.6
EXT POTASSIUM: 4.9
EXT PROGRAF LEVEL: 4.7
EXT PROTEIN TOTAL: 6.6
EXT PROTEIN TOTAL: 6.6
EXT PROTEIN TOTAL: 7
EXT PROTEIN TOTAL: 7.1
EXT SEGS%: 70
EXT SEGS%: 74.2
EXT SEGS%: 75.4
EXT SEGS%: 80.8
EXT SODIUM: 138 MMOL/L
EXT SODIUM: 140 MMOL/L
EXT SODIUM: 141 MMOL/L
EXT SODIUM: 141 MMOL/L
EXT TACROLIMUS LVL: 2.2
EXT TACROLIMUS LVL: 2.8
EXT TACROLIMUS LVL: 5.2
EXT WBC: 5.2
EXT WBC: 8.2
EXT WBC: 8.4
EXT WBC: 8.5
FEF 25 75 LLN: 1.29
FEF 25 75 PRE REF: 91.4 %
FEF 25 75 REF: 2.86
FEV1 FVC LLN: 63
FEV1 FVC PRE REF: 118.2 %
FEV1 FVC REF: 76
FEV1 LLN: 2.76
FEV1 PRE REF: 34.8 %
FEV1 REF: 3.77
FRCPLETH LLN: 2.92
FRCPLETH PREREF: 118.9 %
FRCPLETH REF: 3.9
FVC LLN: 3.74
FVC PRE REF: 29.3 %
FVC REF: 5
MVV LLN: 125
MVV PRE REF: 28.5 %
MVV REF: 147
PEF LLN: 7.08
PEF PRE REF: 39.2 %
PEF REF: 9.67
PRE ERV: 0.54 L
PRE FEF 25 75: 2.61 L/S
PRE FET 100: 6.83 SEC
PRE FEV1 FVC: 89.56 %
PRE FEV1: 1.31 L
PRE FRC PL: 4.64 L
PRE FVC: 1.47 L
PRE MVV: 42 L/MIN
PRE PEF: 3.79 L/S
PRE RV: 4.05 L
PRE TLC: 5.54 L
RAW LLN: 3.06
RAW PRE REF: 84.1 %
RAW PRE: 2.57 CMH2O*S/L
RAW REF: 3.06
RV LLN: 2.01
RV PRE REF: 151 %
RV REF: 2.68
RVTLC LLN: 31
RVTLC PRE REF: 184.4 %
RVTLC PRE: 73.19 %
RVTLC REF: 40
TLC LLN: 6.79
TLC PRE REF: 69.8 %
TLC REF: 7.94
VC LLN: 3.74
VC PRE REF: 29.7 %
VC PRE: 1.49 L
VC REF: 5
VTGRAWPRE: 4.34 L

## 2023-01-01 PROCEDURE — 94726 PULM FUNCT TST PLETHYSMOGRAP: ICD-10-PCS | Mod: 26,S$PBB,TXP, | Performed by: INTERNAL MEDICINE

## 2023-01-01 PROCEDURE — 99214 OFFICE O/P EST MOD 30 MIN: CPT | Mod: S$PBB,25,NTX, | Performed by: INTERNAL MEDICINE

## 2023-01-01 PROCEDURE — 99999 PR PBB SHADOW E&M-EST. PATIENT-LVL III: CPT | Mod: PBBFAC,TXP,, | Performed by: INTERNAL MEDICINE

## 2023-01-01 PROCEDURE — 94010 BREATHING CAPACITY TEST: CPT | Mod: PBBFAC,NTX

## 2023-01-01 PROCEDURE — 99213 OFFICE O/P EST LOW 20 MIN: CPT | Mod: PBBFAC,25,NTX | Performed by: INTERNAL MEDICINE

## 2023-01-01 PROCEDURE — 99999 PR PBB SHADOW E&M-EST. PATIENT-LVL I: ICD-10-PCS | Mod: PBBFAC,TXP,,

## 2023-01-01 PROCEDURE — 94618 PULMONARY STRESS TESTING: CPT | Mod: PBBFAC,NTX

## 2023-01-01 PROCEDURE — 99214 PR OFFICE/OUTPT VISIT, EST, LEVL IV, 30-39 MIN: ICD-10-PCS | Mod: 95,,, | Performed by: NURSE PRACTITIONER

## 2023-01-01 PROCEDURE — 94010 BREATHING CAPACITY TEST: CPT | Mod: 26,S$PBB,TXP, | Performed by: INTERNAL MEDICINE

## 2023-01-01 PROCEDURE — 94010 BREATHING CAPACITY TEST: ICD-10-PCS | Mod: 26,S$PBB,TXP, | Performed by: INTERNAL MEDICINE

## 2023-01-01 PROCEDURE — 94618 PULMONARY STRESS TESTING: ICD-10-PCS | Mod: 26,S$PBB,NTX, | Performed by: INTERNAL MEDICINE

## 2023-01-01 PROCEDURE — 99211 OFF/OP EST MAY X REQ PHY/QHP: CPT | Mod: PBBFAC,TXP

## 2023-01-01 PROCEDURE — 94726 PLETHYSMOGRAPHY LUNG VOLUMES: CPT | Mod: 26,S$PBB,TXP, | Performed by: INTERNAL MEDICINE

## 2023-01-01 PROCEDURE — 99999 PR PBB SHADOW E&M-EST. PATIENT-LVL I: CPT | Mod: PBBFAC,TXP,,

## 2023-01-01 PROCEDURE — 99214 PR OFFICE/OUTPT VISIT, EST, LEVL IV, 30-39 MIN: ICD-10-PCS | Mod: S$PBB,25,NTX, | Performed by: INTERNAL MEDICINE

## 2023-01-01 PROCEDURE — 99214 OFFICE O/P EST MOD 30 MIN: CPT | Mod: 95,,, | Performed by: NURSE PRACTITIONER

## 2023-01-01 PROCEDURE — 99999 PR PBB SHADOW E&M-EST. PATIENT-LVL III: ICD-10-PCS | Mod: PBBFAC,TXP,, | Performed by: INTERNAL MEDICINE

## 2023-01-01 PROCEDURE — 94618 PULMONARY STRESS TESTING: CPT | Mod: 26,S$PBB,NTX, | Performed by: INTERNAL MEDICINE

## 2023-01-01 PROCEDURE — 94726 PLETHYSMOGRAPHY LUNG VOLUMES: CPT | Mod: PBBFAC,TXP

## 2023-01-01 RX ORDER — MIDODRINE HYDROCHLORIDE 5 MG/1
5 TABLET ORAL 3 TIMES DAILY
COMMUNITY
Start: 2022-01-01

## 2023-01-01 RX ORDER — TACROLIMUS 1 MG/1
CAPSULE ORAL
Qty: 90 CAPSULE | Refills: 11 | Status: SHIPPED | OUTPATIENT
Start: 2023-01-01 | End: 2023-01-01 | Stop reason: SDUPTHER

## 2023-01-01 RX ORDER — TACROLIMUS 1 MG/1
CAPSULE ORAL
Qty: 90 CAPSULE | Refills: 11 | Status: SHIPPED | OUTPATIENT
Start: 2023-01-01

## 2023-01-13 NOTE — TELEPHONE ENCOUNTER
----- Message from Miriam García MD sent at 1/9/2023  7:21 PM CST -----  Liver tests are elevated need to see immunosuppression level

## 2023-01-17 NOTE — TELEPHONE ENCOUNTER
CC:  Tony Marin is here today for Surgical Followup (Excision posterior oral alveolar lesion, buccal fat flap on 12/2/2022.)    Medications: medications verified and updated  Added preferred pharmacy  Denies  known Latex allergy or symptoms of Latex sensitivity.  All allergies and medications reviewed.  Patient would like communication of their results via:    Reading Room Phone: [phone] (home)  Okay to leave a message containing results? Yes      Rooming documentation of social history includes tobacco screening only.   Patient discussed at conference. Insurance issues resolved. Patient up and walking with a walker. Walked 450 feet. Wife is very supportive and has purchased everything we have asked her too.  Patient needs to continue on Prehab route.  Committee continues to have concerns regarding patient's frailty and will reassess frequently, but feel patient can be made active on waitlist.

## 2023-01-25 NOTE — TELEPHONE ENCOUNTER
Writer returned patient call, patient stating he thinks he is scheduled for labs Monday but cannot make it then as he is having a dialysis shunt placed.  Writer reviewed chart, patient last labs in early December and is overdue luz maria for a tacro level. Patient advised to go for labs asap this week as his last labs showed elevated liver enzymes and at this point they could be even higher, patient verbalized understanding and states will start working on trying to get a ride to the lab.         ----- Message from Darrell Vaughn sent at 1/25/2023 11:09 AM CST -----  Regarding: call back  Pt call to speak with Helen pt refuse to states the call    Call

## 2023-01-26 NOTE — TELEPHONE ENCOUNTER
Writer received below message from  Lobo cathi  has faxed order, writer attempted to call patient to let him know but no answer.           ----- Message from Pedrito Diamond MA sent at 1/26/2023  9:22 AM CST -----  Patient is currently at a lab in [place], says that he spoke with someone is reference to getting labs drawn. Can you fax any necessary lab orders to Attn: Chantel at fax number [phone]? Thanks.

## 2023-02-07 NOTE — TELEPHONE ENCOUNTER
----- Message from Miriam García MD sent at 2/2/2023 12:42 PM CST -----  Liver tests improved, repeat in a month

## 2023-02-17 NOTE — PROCEDURES
"[facility] - Pulmonary Function  Six Minute Walk     SUMMARY     Ordering Provider: Dr. Rodriguez   Interpreting Provider: Dr. Rodriguez  Performing nurse/tech/RT: CECY De La Rosa, RRT  Diagnosis:  ([place])  Height: 6' 2" (188 cm)  Weight: 96.5 kg (212 lb 10.1 oz)  BMI (Calculated): 27.3   Patient Race:             Phase Oxygen Assessment Supplemental O2 Heart   Rate Blood Pressure Corrina Dyspnea Scale Rating   Resting 97 % Room Air 107 bpm 132/63 4   Exercise        Minute        1 95 % Room Air 107 bpm     2 92 % Room Air 100 bpm     3 95 % Room Air 114 bpm     4 96 % Room Air 116 bpm     5 94 % Room Air 119 bpm     6  94 % Room Air 121 bpm 146/67 3   Recovery        Minute        1 98 % Room Air 116 bpm     2 99 % Room Air 107 bpm     3 99 % Room Air 101 bpm     4 99 % Room Air 103 bpm 142/65 3     Six Minute Walk Summary  6MWT Status: completed without stopping  Patient Reported: Dyspnea     Interpretation:  Did the patient stop or pause?: No                                         Total Time Walked (Calculated): 360 seconds  Final Partial Lap Distance (feet): 100 feet  Total Distance Meters (Calculated): 91.44 meters  Predicted Distance Meters (Calculated): 613.09 meters  Percentage of Predicted (Calculated): 14.91  Peak VO2 (Calculated): 6.72  Mets: 1.92  Has The Patient Had a Previous Six Minute Walk Test?: No       Previous 6MWT Results  Has The Patient Had a Previous Six Minute Walk Test?: No    "

## 2023-02-17 NOTE — PROGRESS NOTES
Pulmonary Outpatient Follow Up Visit     Subjective:       Patient ID: Alon Adamson is a 67 y.o. male.    Chief Complaint: Pleural Effusion        HPI          67-year-old male patient presenting for 3 months    02/17/2023 over the last 3 months he did not experience any acute events, does not report with his wife any hospitalization.      Had 6 minute walking test and PFT today.  Severe reduction of exercise capacity but no need for O2.  PFT showed restrictive lung disorder.  Air trapping noted as well.      Never smoker.  No personal history of asthma.      History of chronic left lung effusion/empyema status post multiple drainage and chest tube placement.    Status post liver transplant for cirrhosis 2018.  Awaiting kidney transplantation.      11/16/2022 patient started hemodialysis via a right chest HD access few weeks ago.  Previously on peritoneal dialysis since January 2022.      August 2022 admitted hospital with GI bleeding likely related to portal hypertension status post IR embolization.      He has had multiple thoracentesis in the past and at some point chest tube placement.      No evidence of malignancy on pleural fluid cytology.  Last thoracentesis done September 2022 PH glucose cell count and differential LDH not in favor of complicated effusion.      April 2021, 200 cc of lynne sanguinous fluid was obtained.    August 2020,  400 of dark lynne clear fluid was recovered      He started January 2022, PD peritoneal dialysis .    April 2022 chest tube to the left it drained about 2 L Dx w ? Rx empyema.  The fluid chemistry and cell count and differential and microbiology were reviewed.  Patient says he was admitted to the hospital in [place] with sepsis.    The cell count was neutrophilic, 90% neutrophils, WBC 3828.    LDH was elevated to 1800.    Glucose 36.  Total protein 2.2.    Has history of liver transplant 2018 for ETOH liver cirrhosis, on  tacrolimus       Tobacco dip her quit 2018.     Used to work in the [employer] in [place].      Review of Systems   Constitutional:  Positive for weakness.   HENT:  Negative for nosebleeds.    Eyes:  Negative for redness.   Respiratory:  Positive for shortness of breath. Negative for choking.    Cardiovascular:  Positive for leg swelling. Negative for chest pain.   Genitourinary:  Negative for hematuria.   Endocrine: ?   Diabetes mellitus Negative for cold intolerance.    Musculoskeletal:  Positive for back pain and gait problem.   Skin:  Positive for rash.   Gastrointestinal:         Cirrhosis status post liver transplant.   Neurological:  Negative for syncope.        History of seizures   Hematological:  Negative for adenopathy.        Immunocompromised.   Psychiatric/Behavioral:  Negative for confusion.      Outpatient Encounter Medications as of 2/17/2023   Medication Sig Dispense Refill    B complex-vitamin C-folic acid (STAR-LAURY/NEPHRO-LAURY) 0.8 mg Tab Take 1 tablet by mouth once daily.      cyclobenzaprine (FLEXERIL) 5 MG tablet Take 5 mg by mouth 3 (three) times daily as needed.      cyproheptadine (PERIACTIN) 4 mg tablet Take 4 mg by mouth 3 (three) times daily as needed.      ergocalciferol (ERGOCALCIFEROL) 50,000 unit Cap Take 50,000 Units by mouth every 7 days. (Saturdays)      gentamicin (GARAMYCIN) 0.1 % cream Apply topically 2 (two) times daily.      midodrine (PROAMATINE) 5 MG Tab Take 5 mg by mouth 3 (three) times daily.      ondansetron (ZOFRAN) 4 MG tablet TAKE 1 TABLET BY MOUTH EVERY 8 HOURS FOR 4 DAYS      pantoprazole (PROTONIX) 40 MG tablet Take 1 tablet (40 mg total) by mouth 2 (two) times daily. 60 tablet 3    promethazine (PHENERGAN) 25 MG tablet Take 25 mg by mouth 3 (three) times daily as needed.      sevelamer carbonate (RENVELA) 800 mg Tab Take 1,600 mg by mouth 3 (three) times daily with meals.      tacrolimus (PROGRAF) 1 MG Cap Take 2 capsules (2 mg total) by mouth every  "morning AND 1 capsule (1 mg total) every evening. 90 capsule 11    torsemide (DEMADEX) 100 MG Tab Take 100 mg by mouth once daily.      furosemide (LASIX) 40 MG tablet Take 80 mg by mouth 2 (two) times a day.      [DISCONTINUED] atorvastatin (LIPITOR) 40 MG tablet Take 40 mg by mouth nightly.      [DISCONTINUED] ferrous sulfate 324 mg (65 mg iron) TbEC Take 1 tablet (324 mg total) by mouth once daily. (Patient not taking: Reported on 12/19/2022) 60 tablet 0    [DISCONTINUED] levothyroxine (SYNTHROID) 50 MCG tablet Take 50 mcg by mouth once daily.      [DISCONTINUED] midodrine (PROAMATINE) 2.5 MG Tab Take 1 tablet (2.5 mg total) by mouth 2 (two) times daily with meals. 60 tablet 2     No facility-administered encounter medications on file as of 2/17/2023.       Objective:     Vital Signs (Most Recent)  Vital Signs  Pulse: 103  Resp: 19  SpO2: 99 %  BP: (!) 142/65  Height and Weight  Height: 6' 2" (188 cm)  Weight: 96.5 kg (212 lb 11.9 oz)  BSA (Calculated - sq m): 2.24 sq meters  BMI (Calculated): 27.3  Weight in (lb) to have BMI = 25: 194.3]  Wt Readings from Last 2 Encounters:   02/17/23 96.5 kg (212 lb 11.9 oz)   02/17/23 96.5 kg (212 lb 10.1 oz)       Physical Exam   Constitutional: He is oriented to person, place, and time. He appears well-developed and well-nourished.   HENT:   Head: Normocephalic.   Cardiovascular: Normal rate, regular rhythm and normal heart sounds.   Pulmonary/Chest: Normal expansion and effort normal. No stridor. No respiratory distress. He exhibits no tenderness.   Breath sounds decreased on the left   Abdominal: Soft.   Musculoskeletal:         General: No tenderness or deformity.      Cervical back: Neck supple.   Lymphadenopathy:     He has no cervical adenopathy.   Neurological: He is alert and oriented to person, place, and time. Gait normal.   Skin: Skin is warm. No cyanosis. Nails show no clubbing.   Psychiatric: He has a normal mood and affect. His behavior is normal. Judgment and " thought content normal.   Nursing note and vitals reviewed.    Laboratory  Lab Results   Component Value Date    WBC 4.49 09/04/2022    RBC 2.71 (L) 09/04/2022    HGB 8.1 (L) 09/04/2022    HCT 24.4 (L) 09/04/2022    MCV 90 09/04/2022    MCH 29.9 09/04/2022    MCHC 33.2 09/04/2022    RDW 15.9 (H) 09/04/2022     (L) 09/04/2022    MPV 9.3 09/04/2022    GRAN 3.4 09/04/2022    GRAN 76.3 (H) 09/04/2022    LYMPH 0.3 (L) 09/04/2022    LYMPH 6.5 (L) 09/04/2022    MONO 0.7 09/04/2022    MONO 14.5 09/04/2022    EOS 0.1 09/04/2022    BASO 0.01 09/04/2022    EOSINOPHIL 1.6 09/04/2022    BASOPHIL 0.2 09/04/2022       BMP  Lab Results   Component Value Date     09/05/2022    K 3.5 11/25/2022     09/05/2022    CO2 22 (L) 09/05/2022    BUN 74 (H) 09/05/2022    CREATININE 5.9 (H) 09/05/2022    CALCIUM 7.0 (L) 09/05/2022    ANIONGAP 8 09/05/2022    ESTGFRAFRICA 16.1 (A) 03/03/2021    EGFRNONAA 14.0 (A) 03/03/2021    AST 9 (L) 09/05/2022    ALT 12 09/05/2022    PROT 3.6 (L) 09/05/2022       Lab Results   Component Value Date    BNP 96 07/29/2020    BNP 88 01/20/2018       Lab Results   Component Value Date    TSH 5.556 (H) 08/17/2018       No results found for: SEDRATE    No results found for: CRP  No results found for: IGE     No results found for: ASPERGILLUS  No results found for: AFUMIGATUSCL     No results found for: ACE      Latest Reference Range & Units 08/03/20 13:48 04/14/21 12:55 04/14/21 12:56   Fluid Color  Yellow  Red   Fluid Appearance  Clear  Hazy   WBC, Body Fluid /cu mm 46  34   Body Fluid Type  Pleural Fluid, Left  Thoracentesis Fluid   Segs, Fluid % 1  3   Lymphs, Fluid % 89  74   Monocytes/Macrophages, Fluid % 10  23   Glucose, Fluid Not established mg/dL 72 91    LD, Fluid Not established U/L 250 150    Body Fluid, Albumin See text g/dL  1.0    Body Fluid Source, Albumin   Thoracentesis Fluid    Body Fluid Source, Glucose  Pleural Fluid, Left Thoracentesis Fluid    Body Fluid Source, LDH   Pleural Fluid, Left Thoracentesis Fluid    Body Fluid Source, Total Protein  Pleural Fluid, Left Thoracentesis Fluid    Body Fluid, Protein Not established g/dL 2.3 1.6        Pleural fluid pathology negative for malignancy.      Diagnostic Results:        I have personally reviewed today the following studies:    CT abdomen pelvis August 31, 2022      Moderate left-sided pleural effusion with thick wall which could reflect a chronic effusion. Empyema is not excluded        Chest x-ray 07/15/2020     There is a large opacities seen within the left lung base that appears to be contiguous with the pleural space along the medial and lateral aspect of the left hemithorax and near the left lung apex.  Findings are consistent with a large left-sided pleural effusion that is primarily subpulmonic in location.  The heart is not enlarged. There appear to be some vascular coils projecting over the upper abdomen best seen on the lateral film.        2D echo 2018     1 - Normal left ventricular systolic function (EF 55-60%).     2 - Normal left ventricular diastolic function.     3 - Normal right ventricular systolic function .     4 - Trivial mitral regurgitation.     5 - Trivial tricuspid regurgitation.     6 - IVC not well seen. However, most likely based on other findings central venous pressures are not elevated.         CT CHEST 4/2022    1.  Decreased size of the left empyema with pigtail catheter terminating within it. Air-fluid level, presumably due to catheter placement.   2.  Overlying lung consolidation with few small foci of cavitation, similar to prior.   3.  Fluid about the gastric cardia and small perihepatic ascites, similar to prior.       Assessment/Plan:   Restrictive lung disease  -     CT Chest Without Contrast; Future; Expected date: 02/17/2023    Pleural effusion on left  -     Cancel: X-Ray Chest PA And Lateral; Future; Expected date: 02/17/2023    S/P thoracentesis    SOBOE (shortness of breath on  exertion)    Dependence on hemodialysis    Liver replaced by transplant    Kidney transplant candidate     At this point patient left-sided pleural effusion has remained chronic without clear etiology.      Possibly hydrothorax versus 3rd spacing.      No evidence of malignancy.  Suspected infection and chest tube placement 1 time in April 2022 in [place].      I will further investigate his restrictive lung disorder with air trapping which appears to be related to probably occupational exposure and the restriction related to pleural effusion/scarring/atelectasis with high-resolution CT scan of the chest.      From respiratory standpoint he is at increased risk for pulmonary postop complications after a kidney transplant however he does not appear to be in acute decompensation and does not have absolute contraindication to proceed with planned kidney transplant once available.    Autoimmune workup T spot and previously QuantiFERON TB gold were negative.  Microbiology 2020 and 2021 from pleural fluid were unremarkable      In summary patient had a recurrent left-sided pleural effusion drained in 2020 and then in 2021 and in April 2022 he was admitted in [place] with suspicion diagnosis of empyema and chest tube placement.  LDH elevated glucose decreased but microbiology negative and WBC 3800 only in pleural fluid    His pleural fluid initially was lymphocytic transudative however in April 2022 looked exudate they have suspicion of a complicated effusion/empyema.    He is immunocompromised on tacrolimus for liver transplant due to ETOH use, he is on hemodialysis for CKD stage 5 and being evaluated for kidney transplant.      I will fax my visit report today to Funmilayo Saravia nurse practitioner with kidney transplantation team       Follow up in about 3 months (around 5/17/2023).    This note was prepared using voice recognition system and is likely to have sound alike errors that may have been overlooked  even after proof reading.  Please call me with any questions    Discussed diagnosis, its evaluation, treatment and usual course. All questions answered.      Augustine Rodriguez MD

## 2023-02-27 PROBLEM — K25.0 ACUTE GASTRIC ULCER WITH HEMORRHAGE: Status: RESOLVED | Noted: 2022-01-01 | Resolved: 2023-01-01

## 2023-02-27 NOTE — PROGRESS NOTES
"The patient location is: home  The chief complaint leading to consultation is: lab discussion    Visit type: audiovisual    Face to Face time with patient: 12  30 minutes of total time spent on the encounter, which includes face to face time and non-face to face time preparing to see the patient (eg, review of tests), Obtaining and/or reviewing separately obtained history, Documenting clinical information in the electronic or other health record, Independently interpreting results (not separately reported) and communicating results to the patient/family/caregiver, or Care coordination (not separately reported).     Each patient to whom he or she provides medical services by telemedicine is:  (1) informed of the relationship between the physician and patient and the respective role of any other health care provider with respect to management of the patient; and (2) notified that he or she may decline to receive medical services by telemedicine and may withdraw from such care at any time.      Patient ID: Alon Adamson is a 67 y.o. male.    Chief Complaint: lab discussion    HPI:  Patient is a 67 year old male who presents today as a new patient telemedicine visit for further evaluation and management of abnormal SPEP.  He has been referred to the hematology clinic by his pulmonologist Dr. DARBY Rodriguez who is following patient for h/o recurrent pleural effusions requiring chest tube placement and thoracentesis (last 4/2021)of transudate fluids negative for malignancy.  Suspected due to third spacing.  He is s/p liver transplant in 2018 due to ETOH liver cirrhosis and is currently on tacrolimus.  He is also on PD that was started on 1/2022; however has switched to HD and is on 3rd week treatment now.       Colonoscopy 12/2021 COLON, CECUM, "POLYP", BIOPSY:   - Tubular adenoma, fragments of   - No evidence of high-grade dysplasia or malignancy (multiple deeper levels   examined)   2. COLON, ASCENDING, "POLYP", " BIOPSY:   - Tubular adenoma, fragments of, with focal villous features   - No evidence of high-grade dysplasia or malignancy (multiple deeper levels   examined)      Interval History:  10/17/2022  State that he is still feeling weak and gets tired fast.  Is using a walker to ambulate.  EGD 9/2/2022 An endoclip was found in the stomach. Normal examined duodenum.   Esophageal ulcer with no stigmata of recent bleeding. (Patient states the bleeding was in his stomach and not in his esophagus)  Also noted with normocytic anemia and thrombocytopenia.  - noted recent CT scan of abdomen showing splenic infarcts present.    Denies any melena or any unusual bleeding     Interval History: 11/21/2022:  States that he is feeling OK.  Just finishing up HD at facility.  States that PD was not working adequately for him because not cleaning blood enough.  With chornic stable anemia d/t chronic disease/ESRD currently on dialysis and cirrhosis of liver.  Thrombocytopenia stable.  Referred for elevated free light chains.  Found to have polyclonal FLC elevation with normal ratio indicative of chronic disease.  LEESA showed no monoclonal paraproteinemia.     Interval History:  2/27/2023 Patient has no complaints today.  Has completed outside labs at [facility].  With chronic stable anemia d/t chronic disease.  Platelets have normalized.  B12, folate, and TSH checked and were normal.  Has polyclonal FLC elevation with normal ratio indicative of chronic disease.       Social History     Socioeconomic History    Marital status:      Spouse name: Vin Adamson    Number of children: 1   Tobacco Use    Smoking status: Never    Smokeless tobacco: Former     Types: Chew     Quit date: 7/29/2018   Substance and Sexual Activity    Alcohol use: No     Alcohol/week: 0.0 standard drinks    Drug use: No    Sexual activity: Not Currently   Social History Narrative    Caregiver Wife/Son       Family History   Problem  Relation Age of Onset    Melanoma Mother     Heart attack Neg Hx     Heart disease Neg Hx     Hypertension Neg Hx        Past Surgical History:   Procedure Laterality Date    APPENDECTOMY      Open    BACK SURGERY      CHOLECYSTECTOMY      laprascopic    COLONOSCOPY N/A 1/25/2018    Procedure: COLONOSCOPY;  Surgeon: Lashawn Myles MD;  Location: [facility] (2ND FLR);  Service: Endoscopy;  Laterality: N/A;    COLONOSCOPY N/A 12/20/2021    Procedure: COLONOSCOPY;  Surgeon: Jesus Grayson MD;  Location: [facility] (2ND FLR);  Service: Endoscopy;  Laterality: N/A;  start with a pediatric colonoscope and might need the slim pediatric colonoscope available.   priority case per Dr. Grayson-labwork am of procedure  RAPID COVID test coming for > 3 hrs away/ prep ins. [email or web address] / Pt requesting specific date for travel purposes    ESOPHAGOGASTRODUODENOSCOPY N/A 8/23/2022    Procedure: EGD (ESOPHAGOGASTRODUODENOSCOPY);  Surgeon: Nora Houser MD;  Location: [facility];  Service: Endoscopy;  Laterality: N/A;    ESOPHAGOGASTRODUODENOSCOPY N/A 8/30/2022    Procedure: EGD (ESOPHAGOGASTRODUODENOSCOPY);  Surgeon: Breanna Palma MD;  Location: [facility];  Service: Endoscopy;  Laterality: N/A;    ESOPHAGOGASTRODUODENOSCOPY N/A 9/1/2022    Procedure: EGD (ESOPHAGOGASTRODUODENOSCOPY);  Surgeon: Breanna Palma MD;  Location: [facility];  Service: Endoscopy;  Laterality: N/A;    ESOPHAGOGASTRODUODENOSCOPY N/A 11/25/2022    Procedure: EGD (ESOPHAGOGASTRODUODENOSCOPY);  Surgeon: Brayan Goodwin MD;  Location: [facility];  Service: Endoscopy;  Laterality: N/A;    FLUOROSCOPY N/A 8/30/2022    Procedure: FLUOROSCOPYembolization;  Surgeon: Elan Rodriguez Jr., MD;  Location: [facility] CATH LAB;  Service: General;  Laterality: N/A;    LIVER TRANSPLANT N/A 7/23/2018    Procedure: TRANSPLANT, LIVER;  Surgeon: Alma Joyce MD;  Location: [facility] OR [address] [place] [address];  Service: Transplant;  Laterality: N/A;    LUMBAR DISCECTOMY       SPLENIC ARTERY EMBOLIZATION  04/08/2016    Dr Taveras    THORACENTESIS Left 8/3/2020    Procedure: Thoracentesis  U/S notified;  Surgeon: Augustine Rodriguez MD;  Location: [facility] ENDO;  Service: Pulmonary;  Laterality: Left;    THORACENTESIS Left 4/14/2021    Procedure: Thoracentesis;  Surgeon: Augustine Rodriguez MD;  Location: [facility] ENDO;  Service: Pulmonary;  Laterality: Left;    TONSILLECTOMY         Past Medical History:   Diagnosis Date    Allergy     Anticoagulant long-term use     Arthritis     Back pain     Cellulitis     Congenital heart disease     GI bleed     Hearing loss     right ear    Hepatic encephalopathy     HTN (hypertension)     Hypertension     diagnosed today (11/25/2015) and prescribed toprol    Leg edema     Nephrolithiasis     JACK (obstructive sleep apnea)     JACK (obstructive sleep apnea)     Seizures     per Pt last seizure 2018- controlled wit medication    Splenomegaly        Review of Systems   Constitutional:  Positive for fatigue.   HENT: Negative.     Eyes: Negative.    Respiratory: Negative.     Cardiovascular: Negative.    Gastrointestinal: Negative.    Endocrine: Negative.    Genitourinary: Negative.    Musculoskeletal: Negative.    Skin: Negative.    Allergic/Immunologic: Negative.    Neurological: Negative.    Hematological: Negative.    Psychiatric/Behavioral: Negative.          Medication List with Changes/Refills   Current Medications    B COMPLEX-VITAMIN C-FOLIC ACID (STAR-LAURY/NEPHRO-LAURY) 0.8 MG TAB    Take 1 tablet by mouth once daily.    CYCLOBENZAPRINE (FLEXERIL) 5 MG TABLET    Take 5 mg by mouth 3 (three) times daily as needed.    CYPROHEPTADINE (PERIACTIN) 4 MG TABLET    Take 4 mg by mouth 3 (three) times daily as needed.    ERGOCALCIFEROL (ERGOCALCIFEROL) 50,000 UNIT CAP    Take 50,000 Units by mouth every 7 days. (Saturdays)    FUROSEMIDE (LASIX) 40 MG TABLET    Take 80 mg by mouth 2 (two) times a day.    GENTAMICIN (GARAMYCIN) 0.1 % CREAM    Apply topically 2  (two) times daily.    MIDODRINE (PROAMATINE) 5 MG TAB    Take 5 mg by mouth 3 (three) times daily.    ONDANSETRON (ZOFRAN) 4 MG TABLET    TAKE 1 TABLET BY MOUTH EVERY 8 HOURS FOR 4 DAYS    PANTOPRAZOLE (PROTONIX) 40 MG TABLET    Take 1 tablet (40 mg total) by mouth 2 (two) times daily.    PROMETHAZINE (PHENERGAN) 25 MG TABLET    Take 25 mg by mouth 3 (three) times daily as needed.    SEVELAMER CARBONATE (RENVELA) 800 MG TAB    Take 1,600 mg by mouth 3 (three) times daily with meals.    TACROLIMUS (PROGRAF) 1 MG CAP    Take 2 capsules (2 mg total) by mouth every morning AND 1 capsule (1 mg total) every evening.    TORSEMIDE (DEMADEX) 100 MG TAB    Take 100 mg by mouth once daily.        Objective:   There were no vitals filed for this visit.    Physical Exam  Constitutional:       Appearance: Normal appearance.   Pulmonary:      Effort: Pulmonary effort is normal.   Neurological:      Mental Status: He is alert and oriented to person, place, and time.   Psychiatric:         Mood and Affect: Mood normal.         Behavior: Behavior normal.         Thought Content: Thought content normal.         Judgment: Judgment normal.       Assessment:     Problem List Items Addressed This Visit          GI    Liver transplanted     Other Visit Diagnoses       ESRD on hemodialysis    -  Primary    Anemia due to chronic kidney disease, on chronic dialysis                Lab Results   Component Value Date    WBC 4.49 09/04/2022    RBC 2.71 (L) 09/04/2022    HGB 8.1 (L) 09/04/2022    HCT 24.4 (L) 09/04/2022    MCV 90 09/04/2022    MCH 29.9 09/04/2022    MCHC 33.2 09/04/2022    RDW 15.9 (H) 09/04/2022     (L) 09/04/2022    MPV 9.3 09/04/2022    GRAN 3.4 09/04/2022    GRAN 76.3 (H) 09/04/2022    LYMPH 0.3 (L) 09/04/2022    LYMPH 6.5 (L) 09/04/2022    MONO 0.7 09/04/2022    MONO 14.5 09/04/2022    EOS 0.1 09/04/2022    BASO 0.01 09/04/2022    EOSINOPHIL 1.6 09/04/2022    BASOPHIL 0.2 09/04/2022      Lab Results   Component Value  Date     09/05/2022    K 3.5 11/25/2022     09/05/2022    CO2 22 (L) 09/05/2022    BUN 74 (H) 09/05/2022    CREATININE 5.9 (H) 09/05/2022    CALCIUM 7.0 (L) 09/05/2022    ANIONGAP 8 09/05/2022    ESTGFRAFRICA 16.1 (A) 03/03/2021    EGFRNONAA 14.0 (A) 03/03/2021     Lab Results   Component Value Date    ALT 12 09/05/2022    AST 9 (L) 09/05/2022     (H) 07/30/2018    ALKPHOS 60 09/05/2022    BILITOT 0.2 09/05/2022       Plan:   ESRD on hemodialysis    Anemia due to chronic kidney disease, on chronic dialysis    Liver transplanted    No need for f/u in the hematology department.  May f/u with nephrology and hepatology and knows that he can reach out to us in the future if further assistance is needed.      Collaborating Provider:  Dr. Cristofer Sosa    Thank You,  Yani Smith, FNP-C  Hematology Oncology

## 2023-03-10 NOTE — TELEPHONE ENCOUNTER
----- Message from Austen Holman sent at 3/10/2023  1:07 PM CST -----  Regarding: Returning Call  Patient called in stating he is returning a call from post liver care team. Requested a call back at earliest convenience.                    Contact: [phone]

## 2023-03-13 NOTE — TELEPHONE ENCOUNTER
----- Message from Austen Holman sent at 3/13/2023  8:24 AM CDT -----  Regarding: Reschedule Lab Appt  Patient called in requesting to have 4pm lab appt canceled; requested to have them done at [facility] in [place], says he has standing orders. No other info provided.                      Contact: [phone]

## 2023-03-13 NOTE — TELEPHONE ENCOUNTER
Spoke to patient's spouse. Cancelled lab appt at [facility]. Will have labs done in [place] on Wednesday 3/15.

## 2023-03-20 NOTE — TELEPHONE ENCOUNTER
----- Message from Stephanie Braden sent at 3/20/2023  8:47 AM CDT -----  Contact: Alon Price is calling in regards to him having car problems can he send his lab orders .Please call back at [phone]            Thanks  KIERA

## 2023-03-22 NOTE — TELEPHONE ENCOUNTER
----- Message from Miriam García MD sent at 3/20/2023  4:53 PM CDT -----  Reviewed, nothing to do; repeat per routine

## 2023-04-27 NOTE — TELEPHONE ENCOUNTER
Patient notified and instructed via [facility]:    Your labs have been reviewed by ; no changes made. Repeat labs due on 6/26/23. Thanks.

## 2023-04-27 NOTE — TELEPHONE ENCOUNTER
----- Message from Miriam García MD sent at 4/26/2023  4:51 PM CDT -----  Reviewed, nothing to do; repeat per routine

## 2023-05-08 ENCOUNTER — PATIENT MESSAGE (OUTPATIENT)
Dept: TRANSPLANT | Facility: CLINIC | Age: 68
End: 2023-05-08
Payer: MEDICARE

## 2023-05-09 NOTE — PROGRESS NOTES
Notified by Helen, Liver Coordinator, that patient passed away on 5/6/23. Removed from the kidney waitlist.

## 2023-08-25 NOTE — TELEPHONE ENCOUNTER
----- Message from Kristel Mcmillan sent at 2/7/2022  7:52 AM CST -----  Please call pt @ [phone] regarding a new medication he has been put on from his Kidney doctor, pt need to discuss.     Do not send to hospital unless pain or severe symptoms cannot be otherwise controlled

## 2023-11-01 NOTE — SUBJECTIVE & OBJECTIVE
Problem: Pain  Goal: #Acceptable pain level achieved/maintained at rest using NRS/Faces  Description: This goal is used for patients who can self-report.  Acceptable means the level is at or below the identified comfort/function goal.  Outcome: Outcome Met, Continue evaluating goal progress toward completion  Goal: # Acceptable pain level achieved/maintained at rest using NRS/Faces without oversedation (opioid naive or PCA/Epidural infusion)  Description: This goal is used if Opioid-naïve or on PCA/Epidural Infusion.  Outcome: Outcome Met, Continue evaluating goal progress toward completion     Problem: At Risk for Falls  Goal: # Patient does not fall  Outcome: Outcome Met, Continue evaluating goal progress toward completion  Goal: # Takes action to control fall-related risks  Outcome: Outcome Met, Continue evaluating goal progress toward completion     Problem: Pressure Injury, Risk for  Goal: # Skin remains intact  Outcome: Outcome Met, Continue evaluating goal progress toward completion      Interval History: Pt was seen and examined in ICU this am and was revisited during the day. Labs and meds reviewed. Discussed with other providers. Tolerated PD well, no c/o's with PD. Discussed with dialysis nurse. No new events. Has dark stools, but no new upper GI bleed reported. Is scheduled for repeat EGD today.    Review of patient's allergies indicates:   Allergen Reactions    Nsaids (non-steroidal anti-inflammatory drug)      D/t to liver disease.  Other reaction(s): Unknown    Penicillins Other (See Comments)     Tolerated pip-tazo in 8/2016 without issue  Tolerated cefepime 7/2018 without issue  Since childhood was told not to take it  Other reaction(s): Unknown     Current Facility-Administered Medications   Medication Frequency    0.9%  NaCl infusion (for blood administration) Q24H PRN    0.9%  NaCl infusion Continuous    aluminum-magnesium hydroxide-simethicone 200-200-20 mg/5 mL suspension 30 mL QID PRN    dextrose 10% bolus 125 mL PRN    glucagon (human recombinant) injection 1 mg PRN    glucose chewable tablet 16 g PRN    glucose chewable tablet 24 g PRN    insulin aspart U-100 pen 1-10 Units Q6H PRN    levothyroxine tablet 50 mcg Daily    melatonin tablet 6 mg Nightly PRN    metoclopramide HCl injection 5 mg Q6H    mupirocin 2 % ointment BID    naloxone 0.4 mg/mL injection 0.02 mg PRN    ondansetron disintegrating tablet 8 mg Q8H PRN    ondansetron injection 4 mg Q6H PRN    pantoprazole 40 mg in  mL infusion (ready to mix system) Continuous    polyethylene glycol packet 17 g Daily    promethazine (PHENERGAN) 12.5 mg in dextrose 5 % 50 mL IVPB Q6H PRN    simethicone chewable tablet 80 mg QID PRN    sodium chloride 0.9% flush 10 mL Q8H PRN    tacrolimus capsule 1 mg Daily PM    tacrolimus capsule 2 mg Daily AM       Objective:     Vital Signs (Most Recent):  Temp: 99.8 °F (37.7 °C) (09/01/22 0715)  Pulse: 101 (09/01/22 1030)  Resp: 20 (09/01/22 1030)  BP: (!) 94/52 (09/01/22 1030)  SpO2: 97 %  (09/01/22 1030)  O2 Device (Oxygen Therapy): room air (09/01/22 0700)   Vital Signs (24h Range):  Temp:  [97.9 °F (36.6 °C)-100.8 °F (38.2 °C)] 99.8 °F (37.7 °C)  Pulse:  [] 101  Resp:  [15-25] 20  SpO2:  [95 %-100 %] 97 %  BP: ()/(44-58) 94/52  Arterial Line BP: ()/(41-56) 108/44     Weight: 112.8 kg (248 lb 10.9 oz) (09/01/22 0608)  Body mass index is 33.73 kg/m².  Body surface area is 2.39 meters squared.    I/O last 3 completed shifts:  In: 6629 [I.V.:3496.3; Blood:1125; IV Piggyback:2007.7]  Out: 370 [Urine:355; Other:15]    Physical Exam  Vitals and nursing note reviewed.   Constitutional:       Appearance: Normal appearance.   Cardiovascular:      Rate and Rhythm: Normal rate and regular rhythm.      Pulses: Normal pulses.      Heart sounds: Normal heart sounds.   Pulmonary:      Effort: Pulmonary effort is normal.      Breath sounds: Normal breath sounds.   Abdominal:      General: Abdomen is flat.      Tenderness: There is no abdominal tenderness.   Musculoskeletal:      Right lower leg: No edema.      Left lower leg: No edema.   Skin:     General: Skin is warm and dry.   Neurological:      Mental Status: He is alert and oriented to person, place, and time.   Psychiatric:         Mood and Affect: Mood normal.         Behavior: Behavior normal.       Significant Labs: reviewed  Lab Results   Component Value Date    WBC 5.94 09/01/2022    HGB 7.9 (L) 09/01/2022    HCT 23.5 (L) 09/01/2022    MCV 88 09/01/2022    PLT 85 (L) 09/01/2022       BMP  Lab Results   Component Value Date     09/01/2022    K 3.7 09/01/2022     09/01/2022    CO2 19 (L) 09/01/2022     (H) 09/01/2022    CREATININE 5.6 (H) 09/01/2022    CALCIUM 7.2 (L) 09/01/2022    ANIONGAP 11 09/01/2022    ESTGFRAFRICA 16.1 (A) 03/03/2021    EGFRNONAA 14.0 (A) 03/03/2021         Significant Imaging: reviewed

## 2023-11-27 NOTE — ASSESSMENT & PLAN NOTE
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

## 2023-12-18 NOTE — SUBJECTIVE & OBJECTIVE
Scheduled Meds:   cholestyramine  1 packet Oral BID    furosemide  60 mg Intravenous BID    heparin (porcine)  5,000 Units Subcutaneous Q8H    lactulose  30 g Oral Q6H    levETIRAcetam  1,500 mg Oral BID    miconazole NITRATE 2 %   Topical (Top) BID    pantoprazole  40 mg Oral BID AC    rifAXImin  550 mg Oral BID    sodium bicarbonate  650 mg Oral BID    spironolactone  200 mg Oral BID     Continuous Infusions:  PRN Meds:dextrose 50%, dextrose 50%, diphenhydrAMINE-zinc acetate 1-0.1%, glucagon (human recombinant), glucose, glucose, hydrOXYzine HCl, insulin aspart U-100, sodium chloride 0.9%    Review of Systems   Constitutional: Positive for appetite change. Negative for chills, fatigue and fever.   Respiratory: Negative for cough, chest tightness and shortness of breath.    Cardiovascular: Positive for leg swelling. Negative for chest pain and palpitations.   Gastrointestinal: Positive for abdominal distention and diarrhea (on lactulose). Negative for abdominal pain, constipation, nausea and vomiting.   Genitourinary: Negative for difficulty urinating, dysuria, frequency and urgency.   Musculoskeletal: Negative for back pain and neck pain.   Skin: Negative for color change, pallor, rash and wound.        pruritis   Neurological: Positive for weakness. Negative for dizziness and headaches.   Psychiatric/Behavioral: Negative for confusion and sleep disturbance.     Objective:     Vital Signs (Most Recent):  Temp: 98.2 °F (36.8 °C) (06/15/18 1157)  Pulse: 90 (06/15/18 1438)  Resp: 17 (06/15/18 0426)  BP: (!) 120/58 (06/15/18 1157)  SpO2: 97 % (06/15/18 1157) Vital Signs (24h Range):  Temp:  [98.2 °F (36.8 °C)-98.9 °F (37.2 °C)] 98.2 °F (36.8 °C)  Pulse:  [60-90] 90  Resp:  [17] 17  SpO2:  [95 %-98 %] 97 %  BP: (120-134)/(58-60) 120/58     Weight: (!) 138.2 kg (304 lb 10.8 oz)  Body mass index is 42.49 kg/m².    Intake/Output - Last 3 Shifts       06/13 0700 - 06/14 0659 06/14 0700 - 06/15 0659 06/15 0700 -  06/16 0659    Urine (mL/kg/hr) 1425 (0.4) 825 (0.2)     Emesis/NG output 0 (0) 0 (0)     Stool 0 (0) 0 (0)     Blood 0 (0) 0 (0)     Total Output 1425 825      Net -1425 -825             Urine Occurrence 2 x      Stool Occurrence 2 x      Emesis Occurrence 0 x            Physical Exam   Constitutional: He is oriented to person, place, and time. He appears well-nourished. He is cooperative.   Eyes: No scleral icterus.   Cardiovascular: Normal rate, regular rhythm, normal heart sounds, intact distal pulses and normal pulses.    No murmur heard.  Pulmonary/Chest: Effort normal. No respiratory distress. He has decreased breath sounds in the right lower field and the left lower field. He has no wheezes. He has no rales.   Abdominal: Soft. Bowel sounds are normal. He exhibits distension. He exhibits no ascites. There is no tenderness. There is no rebound and no guarding.   Musculoskeletal: Normal range of motion. He exhibits edema (+3/+4 edema). He exhibits no tenderness.   Neurological: He is alert and oriented to person, place, and time. He has normal strength.   Skin: Skin is warm, dry and intact. No rash noted. No erythema. There is pallor.   BLE distal erythema with significant flaking and crusts   Psychiatric: He has a normal mood and affect. His speech is normal and behavior is normal.   Nursing note and vitals reviewed.      Laboratory:  Immunosuppressants     None        CBC:     Recent Labs  Lab 06/15/18  0325   WBC 2.41*   RBC 2.50*   HGB 8.3*   HCT 24.4*   PLT 35*   MCV 98   MCH 33.2*   MCHC 34.0     CMP:     Recent Labs  Lab 06/15/18  0324   *   CALCIUM 8.5*   ALBUMIN 2.7*   PROT 5.0*      K 3.6   CO2 20*      BUN 19   CREATININE 1.5*   ALKPHOS 69   ALT 20   AST 28   BILITOT 3.3*     Coagulation:   Recent Labs  Lab 06/09/18  0056  06/15/18  0324   INR 1.8*  < > 1.7*   APTT 25.9  --   --    < > = values in this interval not displayed.    Labs within the past 24 hours have been  reviewed.    Diagnostic Results:  I have personally reviewed all pertinent imaging studies.    used

## 2024-06-26 NOTE — NURSING
Per Dr Jimbo ayers to administer PO evening dose of lactulose will monitor.    Please call and let them know that I apologize I was on vacation, but Dr. Carey does not want to prescribe an appetite stimulant, she reports is no evidence that this will help and there is too many side effects to the medication.  If patient is still not eating enough or having problems with the taste of the medicine, I would definitely reach out to the neurologist for a recommendation.

## 2024-09-28 NOTE — PLAN OF CARE
Pt AAOx4, VSS, afebrile. Fall risk precautions initiated.  Pt in lowest bed position setting, lighting adjusted, pt to wear nonskid socks when ambulating, side rails up x2.  Pt remain free from falls during shift.  Pt's mother remained at bedside and successfully pulled self-meds 100%.   Pt to be brought to dialysis via hospital bed in AM.  Call light within reach. Will continue to monitor.     (2) Some effort against gravity; limb cannot get to or maintain (if cued) 90 (or 45) degrees, drifts down to bed, but has some effort against gravity

## 2024-10-30 NOTE — TELEPHONE ENCOUNTER
-seen above hyperbilirubinemia      Patient notified and instructed: Potassium improved. No changes made. Repeat labs due 11/12/18. Lab Order faxed to local lab. Patient able to repeat instructions and  voiced understanding.

## 2025-02-07 NOTE — SUBJECTIVE & OBJECTIVE
Interval History:   Underwent EGD - no findings to explain of sudden drop in HnH  Undergoing IR paracentesis    Current Facility-Administered Medications   Medication    0.9%  NaCl infusion (for blood administration)    atorvastatin tablet 40 mg    ciprofloxacin (CIPRO)400mg/200ml D5W IVPB 400 mg    dextrose 50% injection 12.5 g    dextrose 50% injection 25 g    docusate sodium capsule 250 mg    ferrous sulfate EC tablet 325 mg    glucagon (human recombinant) injection 1 mg    glucose chewable tablet 16 g    glucose chewable tablet 24 g    insulin aspart pen 0-5 Units    lactulose 20 gram/30 mL solution Soln 60 g    levETIRAcetam tablet 500 mg    magnesium oxide tablet 400 mg    octreotide injection 100 mcg    ondansetron injection 4 mg    pantoprazole EC tablet 40 mg    rifAXIMin tablet 550 mg    sodium chloride 0.9% flush 5 mL    sodium chloride 0.9% flush 5 mL    vancomycin (VANCOCIN) 1,500 mg in sodium chloride 0.9% 250 mL IVPB       Objective:     Vital Signs (Most Recent):  Temp: 98.6 °F (37 °C) (01/22/18 1545)  Pulse: (!) 59 (01/22/18 1634)  Resp: 18 (01/22/18 1634)  BP: (!) 113/56 (01/22/18 1634)  SpO2: 98 % (01/22/18 1634) Vital Signs (24h Range):  Temp:  [98 °F (36.7 °C)-99 °F (37.2 °C)] 98.6 °F (37 °C)  Pulse:  [58-73] 59  Resp:  [16-20] 18  SpO2:  [95 %-100 %] 98 %  BP: (102-146)/() 113/56     Weight: (!) 146 kg (321 lb 14 oz) (01/22/18 0358)  Body mass index is 41.33 kg/m².    Physical Exam   Constitutional: He is oriented to person, place, and time. He appears ill.   Eyes: No scleral icterus.   Cardiovascular: Normal rate.  Exam reveals no friction rub.    Pulmonary/Chest: Effort normal. No respiratory distress.   Abdominal: Soft. Bowel sounds are normal. He exhibits distension. There is no tenderness. There is no rebound and no guarding.   Musculoskeletal: He exhibits edema.   Neurological: He is alert and oriented to person, place, and time.       MELD-Na score: 21 at  1/22/2018  5:59 AM  MELD score: 20 at 1/22/2018  5:59 AM  Calculated from:  Serum Creatinine: 1.8 mg/dL at 1/22/2018  5:59 AM  Serum Sodium: 135 mmol/L at 1/22/2018  5:59 AM  Total Bilirubin: 1.6 mg/dL at 1/22/2018  5:59 AM  INR(ratio): 1.7 at 1/22/2018  5:59 AM  Age: 62 years    Lab Results   Component Value Date    WBC 1.51 (LL) 01/22/2018    HGB 5.6 (LL) 01/22/2018    HCT 16.4 (LL) 01/22/2018     (H) 01/22/2018    PLT 33 (LL) 01/22/2018     Lab Results   Component Value Date    INR 1.7 (H) 01/22/2018     Lab Results   Component Value Date     (L) 01/22/2018    K 4.4 01/22/2018    CREATININE 1.8 (H) 01/22/2018     Lab Results   Component Value Date    ALBUMIN 2.3 (L) 01/22/2018    ALT 22 01/22/2018    AST 40 01/22/2018    GGT 95 (H) 10/29/2015    ALKPHOS 64 01/22/2018    BILITOT 1.6 (H) 01/22/2018     Lab Results   Component Value Date    AFP 5.3 04/21/2017     Lab Results   Component Value Date    LIPASE 190 (H) 01/20/2018     No results found for: TACROLIMUS    Imaging:  Reviewed and as noted in HPI.        Offered and patient declined

## 2025-03-14 NOTE — TELEPHONE ENCOUNTER
Alon Adamson's case discussed at liver discussion conference.   Discussion:CT images reviewed.   Plan: Ok to list.   
Baron

## (undated) DEVICE — GOWN SURGICAL X-LARGE

## (undated) DEVICE — SUT SILK 0 STRANDS 30IN BLK

## (undated) DEVICE — WIPE ESENTA BARR STNG FREE 3ML

## (undated) DEVICE — CATH PROGREAT 2.8FR 110CM PTFE

## (undated) DEVICE — SUT 4-0 12-30IN SILK

## (undated) DEVICE — CATH URETHRAL RED RUBBER 18FR

## (undated) DEVICE — GUIDEWIRE FATHOM .016 X 180

## (undated) DEVICE — NDL MONOPTY BIOPSY 14GX10CM

## (undated) DEVICE — SOL NS 1000CC

## (undated) DEVICE — SET DECANTER MEDICHOICE

## (undated) DEVICE — COVER LIGHT HANDLE 80/CA

## (undated) DEVICE — SUT 2-0 12-18IN SILK

## (undated) DEVICE — SUT SILK 3-0 STRANDS 30IN

## (undated) DEVICE — SUT PROLENE 3-0 SH DA 36 BL

## (undated) DEVICE — SUT 1 36IN PDS II VIO MONO

## (undated) DEVICE — DRESSING ADH ISLAND 3.6 X 14

## (undated) DEVICE — PAD K-THERMIA 24IN X 60IN

## (undated) DEVICE — SUT PROLENE 4-0 SH BLU 36IN

## (undated) DEVICE — SET EXTENSION STERILE 30IN

## (undated) DEVICE — TRAY FOLEY 16FR INFECTION CONT

## (undated) DEVICE — KIT INTRODUCER STIFFEN MICRO

## (undated) DEVICE — DRESSING AQUACEL SACRAL 9 X 9

## (undated) DEVICE — SUT 3-0 12-18IN SILK

## (undated) DEVICE — SET IV ADMIN 15DROP 3 CARESITE

## (undated) DEVICE — ELECTRODE PAD DEFIB STERILE

## (undated) DEVICE — HEMOSTAT SURGICEL NU-KNIT 6X9

## (undated) DEVICE — TOWEL OR XRAY WHITE 17X26IN

## (undated) DEVICE — STAPLER SKIN PROXIMATE WIDE

## (undated) DEVICE — HANDSET ARGON PLUS

## (undated) DEVICE — SUT PDS BV 6-0

## (undated) DEVICE — FIBRILLAR ABS HEMOSTAT 4X4

## (undated) DEVICE — SUT SILK 3-0 SH 18IN BLACK

## (undated) DEVICE — SYR ONLY LUER LOCK 20CC

## (undated) DEVICE — SUT ETHILON 3-0 PS2 18 BLK

## (undated) DEVICE — CLIP SPRING 6MM

## (undated) DEVICE — CATH ANGIO SOFT VU 5FR 65CM

## (undated) DEVICE — Device

## (undated) DEVICE — SEE MEDLINE ITEM 146417

## (undated) DEVICE — SUT SILK 2-0 STRANDS 30IN

## (undated) DEVICE — GUIDEWIRE RUNTHROUGH NS 180CM

## (undated) DEVICE — KIT SAHARA DRAPE DRAW/LIFT

## (undated) DEVICE — SHEATH INTRODUCER 5FR 10CM

## (undated) DEVICE — SUT PROLENE 6-0 BV-1 30IN

## (undated) DEVICE — ELECTRODE REM PLYHSV RETURN 9

## (undated) DEVICE — DRAIN CHANNEL ROUND 19FR

## (undated) DEVICE — PACK CATH LAB CUSTOM BR

## (undated) DEVICE — EVACUATOR WOUND BULB 100CC

## (undated) DEVICE — SEE MEDLINE ITEM 156901

## (undated) DEVICE — GUIDEWIRE SION BLK STR 190CM

## (undated) DEVICE — SEE MEDLINE ITEM 146420

## (undated) DEVICE — SUT PROLENE 5-0 36IN C-1

## (undated) DEVICE — SUT 4-0 12-18IN SILK BLACK

## (undated) DEVICE — VISIPAQUE 320 200ML +PK

## (undated) DEVICE — DRAPE BAG ISOLATION 20 X 20

## (undated) DEVICE — BOOT AIR FLUID HEEL ADLT STD